# Patient Record
Sex: MALE | Race: WHITE | NOT HISPANIC OR LATINO | Employment: UNEMPLOYED | URBAN - METROPOLITAN AREA
[De-identification: names, ages, dates, MRNs, and addresses within clinical notes are randomized per-mention and may not be internally consistent; named-entity substitution may affect disease eponyms.]

---

## 2019-03-18 ENCOUNTER — HOSPITAL ENCOUNTER (EMERGENCY)
Facility: HOSPITAL | Age: 59
End: 2019-03-18
Attending: EMERGENCY MEDICINE | Admitting: EMERGENCY MEDICINE
Payer: COMMERCIAL

## 2019-03-18 ENCOUNTER — APPOINTMENT (EMERGENCY)
Dept: RADIOLOGY | Facility: HOSPITAL | Age: 59
End: 2019-03-18
Payer: COMMERCIAL

## 2019-03-18 ENCOUNTER — APPOINTMENT (EMERGENCY)
Dept: CT IMAGING | Facility: HOSPITAL | Age: 59
End: 2019-03-18
Payer: COMMERCIAL

## 2019-03-18 ENCOUNTER — OFFICE VISIT (OUTPATIENT)
Dept: URGENT CARE | Facility: CLINIC | Age: 59
End: 2019-03-18
Payer: COMMERCIAL

## 2019-03-18 ENCOUNTER — APPOINTMENT (EMERGENCY)
Dept: RADIOLOGY | Facility: HOSPITAL | Age: 59
DRG: 024 | End: 2019-03-18
Payer: COMMERCIAL

## 2019-03-18 VITALS
SYSTOLIC BLOOD PRESSURE: 177 MMHG | HEIGHT: 66 IN | BODY MASS INDEX: 35.2 KG/M2 | DIASTOLIC BLOOD PRESSURE: 88 MMHG | HEART RATE: 74 BPM | TEMPERATURE: 98.2 F | WEIGHT: 219 LBS | OXYGEN SATURATION: 99 % | RESPIRATION RATE: 20 BRPM

## 2019-03-18 VITALS
TEMPERATURE: 97.6 F | HEIGHT: 66 IN | OXYGEN SATURATION: 97 % | SYSTOLIC BLOOD PRESSURE: 173 MMHG | WEIGHT: 209 LBS | BODY MASS INDEX: 33.59 KG/M2 | RESPIRATION RATE: 20 BRPM | DIASTOLIC BLOOD PRESSURE: 107 MMHG | HEART RATE: 75 BPM

## 2019-03-18 DIAGNOSIS — R51.9 HEADACHE: ICD-10-CM
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
## 2019-03-18 DIAGNOSIS — H53.8 BLURRY VISION, BILATERAL: ICD-10-CM

## 2019-03-18 DIAGNOSIS — I63.9 STROKE (HCC): Primary | ICD-10-CM

## 2019-03-18 DIAGNOSIS — R41.82 ALTERED MENTAL STATUS, UNSPECIFIED ALTERED MENTAL STATUS TYPE: Primary | ICD-10-CM

## 2019-03-18 LAB
ABO GROUP BLD: NORMAL
ALBUMIN SERPL BCP-MCNC: 4.2 G/DL (ref 3.5–5)
ALP SERPL-CCNC: 110 U/L (ref 46–116)
ALT SERPL W P-5'-P-CCNC: 40 U/L (ref 12–78)
ANION GAP SERPL CALCULATED.3IONS-SCNC: 11 MMOL/L (ref 4–13)
APTT PPP: 30 SECONDS (ref 26–38)
AST SERPL W P-5'-P-CCNC: 29 U/L (ref 5–45)
ATRIAL RATE: 68 BPM
BASOPHILS # BLD AUTO: 0.1 THOUSANDS/ΜL (ref 0–0.1)
BASOPHILS NFR BLD AUTO: 1 % (ref 0–1)
BILIRUB SERPL-MCNC: 0.3 MG/DL (ref 0.2–1)
BLD GP AB SCN SERPL QL: NEGATIVE
BUN SERPL-MCNC: 19 MG/DL (ref 5–25)
CALCIUM SERPL-MCNC: 9.4 MG/DL (ref 8.3–10.1)
CHLORIDE SERPL-SCNC: 101 MMOL/L (ref 100–108)
CO2 SERPL-SCNC: 28 MMOL/L (ref 21–32)
CREAT SERPL-MCNC: 0.98 MG/DL (ref 0.6–1.3)
EOSINOPHIL # BLD AUTO: 0.57 THOUSAND/ΜL (ref 0–0.61)
EOSINOPHIL NFR BLD AUTO: 4 % (ref 0–6)
ERYTHROCYTE [DISTWIDTH] IN BLOOD BY AUTOMATED COUNT: 14 % (ref 11.6–15.1)
GFR SERPL CREATININE-BSD FRML MDRD: 83 ML/MIN/1.73SQ M
GLUCOSE SERPL-MCNC: 98 MG/DL (ref 65–140)
HCT VFR BLD AUTO: 50.9 % (ref 36.5–49.3)
HGB BLD-MCNC: 17.3 G/DL (ref 12–17)
IMM GRANULOCYTES # BLD AUTO: 0.06 THOUSAND/UL (ref 0–0.2)
IMM GRANULOCYTES NFR BLD AUTO: 1 % (ref 0–2)
INR PPP: 0.97 (ref 0.86–1.17)
LYMPHOCYTES # BLD AUTO: 4.78 THOUSANDS/ΜL (ref 0.6–4.47)
LYMPHOCYTES NFR BLD AUTO: 37 % (ref 14–44)
MCH RBC QN AUTO: 32.6 PG (ref 26.8–34.3)
MCHC RBC AUTO-ENTMCNC: 34 G/DL (ref 31.4–37.4)
MCV RBC AUTO: 96 FL (ref 82–98)
MONOCYTES # BLD AUTO: 1.05 THOUSAND/ΜL (ref 0.17–1.22)
MONOCYTES NFR BLD AUTO: 8 % (ref 4–12)
NEUTROPHILS # BLD AUTO: 6.44 THOUSANDS/ΜL (ref 1.85–7.62)
NEUTS SEG NFR BLD AUTO: 49 % (ref 43–75)
NRBC BLD AUTO-RTO: 0 /100 WBCS
P AXIS: 37 DEGREES
PLATELET # BLD AUTO: 299 THOUSANDS/UL (ref 149–390)
PMV BLD AUTO: 10 FL (ref 8.9–12.7)
POTASSIUM SERPL-SCNC: 4.5 MMOL/L (ref 3.5–5.3)
PR INTERVAL: 142 MS
PROT SERPL-MCNC: 7.9 G/DL (ref 6.4–8.2)
PROTHROMBIN TIME: 12.3 SECONDS (ref 11.8–14.2)
QRS AXIS: 42 DEGREES
QRSD INTERVAL: 88 MS
QT INTERVAL: 396 MS
QTC INTERVAL: 421 MS
RBC # BLD AUTO: 5.3 MILLION/UL (ref 3.88–5.62)
RH BLD: POSITIVE
SL AMB POCT GLUCOSE BLD: 115
SODIUM SERPL-SCNC: 140 MMOL/L (ref 136–145)
SPECIMEN EXPIRATION DATE: NORMAL
T WAVE AXIS: 21 DEGREES
TROPONIN I SERPL-MCNC: 0.02 NG/ML
VENTRICULAR RATE: 68 BPM
WBC # BLD AUTO: 13 THOUSAND/UL (ref 4.31–10.16)

## 2019-03-18 PROCEDURE — 96361 HYDRATE IV INFUSION ADD-ON: CPT

## 2019-03-18 PROCEDURE — 80053 COMPREHEN METABOLIC PANEL: CPT | Performed by: EMERGENCY MEDICINE

## 2019-03-18 PROCEDURE — 0042T HB CT PERFUSION W/CONTRAST CBF: CPT

## 2019-03-18 PROCEDURE — 86901 BLOOD TYPING SEROLOGIC RH(D): CPT | Performed by: EMERGENCY MEDICINE

## 2019-03-18 PROCEDURE — 70498 CT ANGIOGRAPHY NECK: CPT

## 2019-03-18 PROCEDURE — 85730 THROMBOPLASTIN TIME PARTIAL: CPT | Performed by: EMERGENCY MEDICINE

## 2019-03-18 PROCEDURE — 93010 ELECTROCARDIOGRAM REPORT: CPT | Performed by: INTERNAL MEDICINE

## 2019-03-18 PROCEDURE — 36415 COLL VENOUS BLD VENIPUNCTURE: CPT | Performed by: EMERGENCY MEDICINE

## 2019-03-18 PROCEDURE — 99285 EMERGENCY DEPT VISIT HI MDM: CPT

## 2019-03-18 PROCEDURE — 84484 ASSAY OF TROPONIN QUANT: CPT | Performed by: EMERGENCY MEDICINE

## 2019-03-18 PROCEDURE — 99213 OFFICE O/P EST LOW 20 MIN: CPT | Performed by: NURSE PRACTITIONER

## 2019-03-18 PROCEDURE — 93005 ELECTROCARDIOGRAM TRACING: CPT

## 2019-03-18 PROCEDURE — 70496 CT ANGIOGRAPHY HEAD: CPT

## 2019-03-18 PROCEDURE — 85025 COMPLETE CBC W/AUTO DIFF WBC: CPT | Performed by: EMERGENCY MEDICINE

## 2019-03-18 PROCEDURE — 96360 HYDRATION IV INFUSION INIT: CPT

## 2019-03-18 PROCEDURE — 71045 X-RAY EXAM CHEST 1 VIEW: CPT

## 2019-03-18 PROCEDURE — 86850 RBC ANTIBODY SCREEN: CPT | Performed by: EMERGENCY MEDICINE

## 2019-03-18 PROCEDURE — 85610 PROTHROMBIN TIME: CPT | Performed by: EMERGENCY MEDICINE

## 2019-03-18 PROCEDURE — 86900 BLOOD TYPING SEROLOGIC ABO: CPT | Performed by: EMERGENCY MEDICINE

## 2019-03-18 RX ORDER — METOPROLOL SUCCINATE 100 MG/1
100 TABLET, EXTENDED RELEASE ORAL DAILY
COMMUNITY
End: 2019-04-06 | Stop reason: HOSPADM

## 2019-03-18 RX ORDER — SODIUM CHLORIDE 9 MG/ML
125 INJECTION, SOLUTION INTRAVENOUS CONTINUOUS
Status: DISCONTINUED | OUTPATIENT
Start: 2019-03-18 | End: 2019-03-18 | Stop reason: HOSPADM

## 2019-03-18 RX ORDER — SIMVASTATIN 40 MG
40 TABLET ORAL
COMMUNITY
End: 2019-03-18

## 2019-03-18 RX ORDER — OMEPRAZOLE 20 MG/1
20 CAPSULE, DELAYED RELEASE ORAL DAILY
COMMUNITY
End: 2019-03-18

## 2019-03-18 RX ORDER — METOPROLOL TARTRATE 100 MG/1
25 TABLET ORAL EVERY 12 HOURS SCHEDULED
COMMUNITY
End: 2019-03-18

## 2019-03-18 RX ADMIN — SODIUM CHLORIDE 125 ML/HR: 0.9 INJECTION, SOLUTION INTRAVENOUS at 19:18

## 2019-03-18 RX ADMIN — IOHEXOL 95 ML: 350 INJECTION, SOLUTION INTRAVENOUS at 19:59

## 2019-03-18 NOTE — PROGRESS NOTES
Employer told pt he was stumbling with works today  Pt fell at home this am, doesn't believe he hit his head  Has no strength in arms and hands  Pt states he is a "pre-diabetic"  Pt just home from vacation [facility]

## 2019-03-18 NOTE — ED PROVIDER NOTES
History  Chief Complaint   Patient presents with    Head Injury     Patient states he awoke last night with a leg cramp and fell, alberto is not sure if he hit his head but states he feels like his judgement is off and he has a headache, and blurred vision  Alberto states lat week while on vacation he had the same issues where he didn't feel like himself, and his judgement was off     This is a 26-year-old male who presents for evaluation of generalized weakness  Patient states that at 3 o'clock this morning while sleeping he felt a cramp in his legs and he got up out of bed to walk it off both of his legs felt weak and gave out he fell but did not injure anything was no loss of consciousness no neck or back pain no head injury  He went back to bed and then woke up at 4:30 a m  This morning and was neurologically normal   At 6:00 a m  At work he states that his coworkers told him that he had slurred speech and seemed to be acting confused and groggy  That episode lasted for approximately 30 minutes  Denies any numbness he is ambulatory into the emergency room without any problems he had a frontal headache for approximately 2 weeks  He is a  and 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  Taking?   metFORMIN (GLUCOPHAGE) 500 mg tablet  Self Yes Yes   Sig: Take 500 mg by mouth 2 (two) times a day with meals metoprolol succinate (TOPROL-XL) 100 mg 24 hr tablet  Self Yes Yes   Sig: Take 100 mg by mouth daily      Facility-Administered Medications: None       Past Medical History:   Diagnosis Date    Diabetes mellitus ([facility] [address] )     Hypertension        Past Surgical History:   Procedure Laterality Date    BACK SURGERY      SHOULDER SURGERY         History reviewed  No pertinent family history  I have reviewed and agree with the history as documented  Social History     Tobacco Use    Smoking status: Current Every Day Smoker    Smokeless tobacco: Never Used   Substance Use Topics    Alcohol use: Yes    Drug use: Never        Review of Systems   Cardiovascular: Negative for chest pain  Gastrointestinal: Negative for abdominal pain  Neurological: Positive for speech difficulty, weakness and headaches  All other systems reviewed and are negative  Physical Exam  Physical Exam   Constitutional: He is oriented to person, place, and time  He appears well-developed and well-nourished  No distress  HENT:   Head: Normocephalic and atraumatic  Right Ear: External ear normal    Left Ear: External ear normal    Nose: Nose normal    Mouth/Throat: Oropharynx is clear and moist    Eyes: Pupils are equal, round, and reactive to light  EOM are normal  Right eye exhibits no discharge  Left eye exhibits no discharge  No scleral icterus  Neck: Neck supple  No JVD present  No tracheal deviation present  Cardiovascular: Normal rate, regular rhythm and intact distal pulses  Exam reveals no gallop and no friction rub  No murmur heard  Pulmonary/Chest: Effort normal and breath sounds normal  No stridor  No respiratory distress  He has no wheezes  He has no rales  Abdominal: Soft  Bowel sounds are normal  He exhibits no distension  There is no tenderness  There is no guarding  Musculoskeletal: Normal range of motion  He exhibits no edema, tenderness or deformity     Neurological: He is alert and oriented to person, place, and time  He displays normal reflexes  No cranial nerve deficit or sensory deficit  He exhibits normal muscle tone  Coordination normal    Skin: Skin is warm and dry  No rash noted  He is not diaphoretic  Psychiatric: He has a normal mood and affect  His behavior is normal  Judgment and thought content normal    Nursing note and vitals reviewed        Vital Signs  ED Triage Vitals   Temperature Pulse Respirations Blood Pressure SpO2   03/18/19 1818 03/18/19 1818 03/18/19 1818 03/18/19 1818 03/18/19 1818   97 6 °F (36 4 °C) 76 20 134/63 98 %      Temp src Heart Rate Source Patient Position - Orthostatic VS BP Location FiO2 (%)   -- 03/18/19 1905 03/18/19 1905 -- --    Monitor Lying        Pain Score       03/18/19 1818       No Pain           Vitals:    03/18/19 2222 03/18/19 2227 03/18/19 2232 03/18/19 2237   BP: (!) 193/99 (!) 179/101 (!) 178/105 (!) 173/107   Pulse: 75 76 76 75   Patient Position - Orthostatic VS: Lying Lying Lying Lying       qSOFA     Row Name 03/20/19 0645 03/20/19 0630 03/20/19 0615 03/20/19 0600 03/20/19 0545    Altered mental status GCS < 15              Respiratory Rate > / =22  0  0  0  0  0    Systolic BP < / =624              Q Sofa Score    Tom Arenas      Row Name 03/20/19 0515 03/20/19 0500 03/20/19 0445 03/20/19 0430 03/20/19 0400    Altered mental status GCS < 15              Respiratory Rate > / =22  0  0  0  0  0    Systolic BP < / =766      [facility] Sofa Score    Tom Arenas      Row Name 03/20/19 0345 03/20/19 0330 03/20/19 0315 03/20/19 0300 03/20/19 0245    Altered mental status GCS < 15              Respiratory Rate > / =22  0  0  0  0  0    Systolic BP < / =992              Q Sofa Score      0  0  0    Row Name 03/20/19 0230 03/20/19 0215 03/20/19 0200 03/20/19 0153 03/20/19 0140    Altered mental status GCS < 15              Respiratory Rate > / =22  0  0  0  0  1    Systolic BP < / =395              Q Sofa Score  0  0  0  0 1    Row Name 03/20/19 0100 03/20/19 0052 03/20/19 0045 03/20/19 0030 03/20/19 0020    Altered mental status GCS < 15              Respiratory Rate > / =22  0  0  0  0  0    Systolic BP < / =203              Q Sofa Score  0  0  0  1  1    Row Name 03/20/19 0000 03/19/19 2315 03/19/19 2310 03/19/19 2300 03/19/19 2245    Altered mental status GCS < 15  0      1      Respiratory Rate > / =22    0  0  0  0    Systolic BP < / =801    0          Q Sofa Score  0  1          Row Name 03/19/19 2230 03/19/19 2215 03/19/19 2206 03/19/19 2200 03/19/19 2145    Altered mental status GCS < 15              Respiratory Rate > / =22  0  0  0  0  0    Systolic BP < / =545              Q Sofa Score              Row Name 03/19/19 2130 03/19/19 2100 03/19/19 2030 03/19/19 2014 03/19/19 2000    Altered mental status GCS < 15          0    Respiratory Rate > / =22  0  0  0  0  0    Systolic BP < / =653              Q Sofa Score    Mary Mccabe      Row Name 03/19/19 1930 03/19/19 1900 03/19/19 1805 03/19/19 1800 03/19/19 1700    Altered mental status GCS < 15    0  0    0    Respiratory Rate > / =22  0  0    0  0    Systolic BP < / =592              Q Sofa Score    0  0  0  0    Row Name 03/19/19 1645 03/19/19 1630 03/19/19 1600 03/19/19 1542 03/19/19 1530    Altered mental status GCS < 15        0  0    Respiratory Rate > / =22  0  0  0    0    Systolic BP < / =511              Q Sofa Score  0  0  0  0  0    Row Name 03/19/19 1515 03/19/19 1500 03/19/19 1445 03/19/19 1045 03/19/19 1000    Altered mental status GCS < 15    0  0    0    Respiratory Rate > / =22  0  0  0  1  1    Systolic BP < / =934    0    0  0    Q Sofa Score  0  0  0  1  1    Row Name 03/19/19 0900 03/19/19 0802 03/19/19 0800 03/19/19 0730 03/19/19 0715    Altered mental status GCS < 15  0  0          Respiratory Rate > / =22  0    1  1  0    Systolic BP < / =940  0    0  0  0    Q Sofa Score  0  1  2 2  0    Row Name 03/19/19 0700 03/19/19 0645 03/19/19 0630 03/19/19 0600 03/19/19 0530    Altered mental status GCS < 15  1      1      Respiratory Rate > / =22  1  0  0    0    Systolic BP < / =801  0  0  0    0    Q Sofa Score  2  0  0  1  0    Row Name 03/19/19 0500 03/19/19 0415 03/19/19 0400 03/19/19 0345 03/19/19 0315    Altered mental status GCS < 15  0    0        Respiratory Rate > / =22    0    0  0    Systolic BP < / =149    0    0  0    Q Sofa Score  0  0  0  0  0    Row Name 03/19/19 0300 03/19/19 0230 03/19/19 0200 03/19/19 0130 03/19/19 0100    Altered mental status GCS < 15  0    1    0    Respiratory Rate > / =22    0  0  0      Systolic BP < / =958    0  0  0      Q Sofa Score  0  0  1  0  0    Row Name 03/19/19 0030 03/19/19 0015 03/18/19 2343 03/18/19 2237 03/18/19 2232    Altered mental status GCS < 15      0  0  0    Respiratory Rate > / =22  0  0  0  0  0    Systolic BP < / =158  0  0  0  0  0    Q Sofa Score  0  0  0  0  0    Row Name 03/18/19 2227 03/18/19 2222 03/18/19 22:17:58 03/18/19 22:02:54 03/18/19 22:00:18    Altered mental status GCS < 15  0  0  0  0      Respiratory Rate > / =22  0  0  0  0  1    Systolic BP < / =552  0  0  0  0      Q Sofa Score  0  0  0  0  1    Row Name 03/18/19 21:54:18 03/18/19 2153 03/18/19 21:50:18 03/18/19 21:44:58 03/18/19 21:42:24    Altered mental status GCS < 15          0    Respiratory Rate > / =22  0  0  1  1  0    Systolic BP < / =449    0      0    Q Sofa Score  0  0  1  1  0    Row Name 03/18/19 2142 03/18/19 21:40:18 03/18/19 21:34:38 03/18/19 21:30:18 03/18/19 2130    Altered mental status GCS < 15              Respiratory Rate > / =22    1  1  1      Systolic BP < / =799  0        0    Q Sofa Score  1  1  1  1  1    Row Name 03/18/19 21:25:18 03/18/19 21:19:38 03/18/19 21:15:18 03/18/19 2115 03/18/19 21:11:59    Altered mental status GCS < 15          0    Respiratory Rate > / =22  1  1  1    0 Systolic BP < / =534        0  0    Q Sofa Score  1  1  1  0  0    Row Name 03/18/19 21:10:17 03/18/19 21:04:38 03/18/19 21:00:18 03/18/19 2100 03/18/19 20:55:18    Altered mental status GCS < 15              Respiratory Rate > / =22  1  0  0    0    Systolic BP < / =642        0      Q Sofa Score  1  0  0  0  0    Row Name 03/18/19 20:49:47 03/18/19 2046 03/18/19 20:45:17 03/18/19 20:40:17 03/18/19 2040    Altered mental status GCS < 15          0    Respiratory Rate > / =22  1    0  0  0    Systolic BP < / =360    0      0    Q Sofa Score  1  0  0  0  0    Row Name 03/18/19 20:34:37 03/18/19 20:30:17 03/18/19 2030 03/18/19 20:25:17 03/18/19 20:19:38    Altered mental status GCS < 15              Respiratory Rate > / =22  0  1    0  0    Systolic BP < / =932      0        Q Sofa Score  0  1  0  0  0    Row Name 03/18/19 20:15:18 03/18/19 2015 03/18/19 20:10:17 03/18/19 20:10:03 03/18/19 20:04:47    Altered mental status GCS < 15        0      Respiratory Rate > / =22  1    0  0  0    Systolic BP < / =850    0    0      Q Sofa Score  1  0  0  0  0    Row Name 03/18/19 2001 03/18/19 19:59:37 03/18/19 19:56:17 03/18/19 1955 03/18/19 1935    Altered mental status GCS < 15        0  0    Respiratory Rate > / =22    0  0  0  0    Systolic BP < / =446  0      0  0    Q Sofa Score  0  0  0  0  0    Row Name 03/18/19 19:32:57 03/18/19 19:30:17 03/18/19 1930 03/18/19 19:25:17 03/18/19 19:19:37    Altered mental status GCS < 15              Respiratory Rate > / =22  1  0    1  1    Systolic BP < / =818      0        Q Sofa Score  1  0  1  1  1    Row Name 03/18/19 19:19:22 03/18/19 19:15:17 03/18/19 1915 03/18/19 19:10:17 03/18/19 1905    Altered mental status GCS < 15  0        0    Respiratory Rate > / =22  0  1    1  0    Systolic BP < / =631  0    0    0    Q Sofa Score  0  1  1  1  0    Row Name 03/18/19 19:04:47 03/18/19 1901 03/18/19 19:00:17 03/18/19 18:55:17 03/18/19 18:49:37    Altered mental status GCS < 15              Respiratory Rate > / =22  1    1  1  1    Systolic BP < / =464    0    Deja Burows Sofa Score  1  1  1  1  1    Row Name 03/18/19 18:45:17 03/18/19 1845 03/18/19 18:40:17 03/18/19 18:34:37 03/18/19 18:30:17    Altered mental status GCS < 15              Respiratory Rate > / =22  1    1  1  1    Systolic BP < / =166    0    Deja Burows Sofa Score  1  1  1  1  1    Row Name 03/18/19 1830 03/18/19 18:25:17 03/18/19 1818 03/18/19 1714       Altered mental status GCS < 15             Respiratory Rate > / =22    1  0  0     Systolic BP < / =887  0    0  0     Q Sofa Score  0  1  0  0           Visual Acuity  Visual Acuity      Most Recent Value   L Pupil Size (mm)  3   R Pupil Size (mm)  3          ED Medications  Medications   iohexol (OMNIPAQUE) 350 MG/ML injection (SINGLE-DOSE) 95 mL (95 mL Intravenous Given 3/1959)       Diagnostic Studies  Results Reviewed     Procedure Component Value Units Date/Time    Troponin I [[id number]]  (Normal) Collected:  03/18/19 1913    Lab Status:  Final result Specimen:  Blood from Arm, Left Updated:  03/18/19 1949     Troponin I 0 02 ng/mL     Comprehensive metabolic panel [[id number]] Collected:  03/18/19 1913    Lab Status:  Final result Specimen:  Blood from Arm, Left Updated:  03/18/19 1946     Sodium 140 mmol/L      Potassium 4 5 mmol/L      Chloride 101 mmol/L      CO2 28 mmol/L      ANION GAP 11 mmol/L      BUN 19 mg/dL      Creatinine 0 98 mg/dL      Glucose 98 mg/dL      Calcium 9 4 mg/dL      AST 29 U/L      ALT 40 U/L      Alkaline Phosphatase 110 U/L      Total Protein 7 9 g/dL      Albumin 4 2 g/dL      Total Bilirubin 0 30 mg/dL      eGFR 83 ml/min/1 73sq m     Narrative:       National Kidney Disease Education Program recommendations are as follows:  GFR calculation is accurate only with a steady state creatinine  Chronic Kidney disease less than 60 ml/min/1 73 sq  meters  Kidney failure less than 15 ml/min/1 73 sq  meters  Protime-INR [[id number]]  (Normal) Collected:  03/18/19 1913    Lab Status:  Final result Specimen:  Blood from Arm, Left Updated:  03/18/19 1942     Protime 12 3 seconds      INR 0 97    APTT [[id number]]  (Normal) Collected:  03/18/19 1913    Lab Status:  Final result Specimen:  Blood from Arm, Left Updated:  03/18/19 1942     PTT 30 seconds     CBC and differential [[id number]]  (Abnormal) Collected:  03/18/19 1913    Lab Status:  Final result Specimen:  Blood from Arm, Left Updated:  03/18/19 1929     WBC 13 00 Thousand/uL      RBC 5 30 Million/uL      Hemoglobin 17 3 g/dL      Hematocrit 50 9 %      MCV 96 fL      MCH 32 6 pg      MCHC 34 0 g/dL      RDW 14 0 %      MPV 10 0 fL      Platelets 141 Thousands/uL      nRBC 0 /100 WBCs      Neutrophils Relative 49 %      Immat GRANS % 1 %      Lymphocytes Relative 37 %      Monocytes Relative 8 %      Eosinophils Relative 4 %      Basophils Relative 1 %      Neutrophils Absolute 6 44 Thousands/µL      Immature Grans Absolute 0 06 Thousand/uL      Lymphocytes Absolute 4 78 Thousands/µL      Monocytes Absolute 1 05 Thousand/µL      Eosinophils Absolute 0 57 Thousand/µL      Basophils Absolute 0 10 Thousands/µL                  CTA head and neck with and without contrast   Final Result by Ty Romano MD (03/18 2127)      No evidence of acute intracranial hemorrhage  Right M1 segment occlusion  Endovascular consult recommended  Approximately 73% narrowing of the proximal right ICA just beyond the bifurcation  I personally discussed this study with Dr Carlos Hoffman clinically on 3/18/2019 at 9:22 PM                      Workstation performed: [id number]         CT recon only cervical spine   Final Result by Ty Romano MD (03/18 2136)      No acute fracture or dislocation                 Workstation performed: [id number]         XR chest 1 view portable   Final Result by Nubia Chapa MD (03/19 9605)   1  Clear lungs  2   Mild cardiomegaly  Workstation performed: [id number]                    Procedures  Procedures       Phone Contacts  ED Phone Contact    ED Course             Stroke Assessment     Row Name 03/18/19 1850 03/18/19 2201          NIH Stroke Scale    Interval  Baseline       Level of Consciousness (1a )  0  0     LOC Questions (1b )  0  0     LOC Commands (1c )  0  0     Best Gaze (2 )  0  0     Visual (3 )  0  0     Facial Palsy (4 )  0  0     Motor Arm, Left (5a )  0  0     Motor Arm, Right (5b )  0  0     Motor Leg, Left (6a )  0  0     Motor Leg, Right (6b )  0  0     Limb Ataxia (7 )  0  0     Sensory (8 )  0  0     Best Language (9 )  0  0     Dysarthria (10 )  0  0     Extinction and Inattention (11 ) (Formerly Neglect)  0  0     Total  0  0         First Filed Value   TPA Decision  Patient not a TPA candidate  Patient is not a candidate options  Unclear time of onset outside appropriate time window                          MDM  Number of Diagnoses or Management Options  Diagnosis management comments: Confusion and slurred speech earlier this morning patient neurologically normal at this time he is certainly not a tPA candidate will check CT scan and labs may have had a TIA versus hypoglycemia or cardiac rhythm disturbance most likely will require observation bed for further evaluation and Neurology consultation       Amount and/or Complexity of Data Reviewed  Clinical lab tests: ordered  Tests in the radiology section of CPT®: ordered        Disposition  Final diagnoses:   Fall, initial encounter   Stroke [facility])   Headache     Time reflects when diagnosis was documented in both MDM as applicable and the Disposition within this note     Time User Action Codes Description Comment    3/18/2019 10:05 PM Randa Burton Add [S09 90XA] Traumatic injury of head, initial encounter     3/18/2019 10:05 PM Randa Burton Remove [S09 90XA] Traumatic injury of head, initial encounter     3/18/2019 10:05 PM Mirella Pickett Add [S55  XGFX] Fall, initial encounter     3/18/2019 10:06 PM Mirella Pickett Add [I63 9] Stroke ([address] )     3/18/2019 10:06 PM Ann CLARK Add [R51] Headache     3/18/2019 10:06 PM Mirella Pickett Modify [D78  AFWQ] Fall, initial encounter     3/18/2019 10:06 PM Mirella Pickett Modify [I63 9] Stroke [facility])       ED Disposition     ED Disposition Condition Date/Time Comment    Transfer to Another [facility] Mar 18, 2019 10:01 PM Maria Baeza should be transferred out to [facility]          MD Documentation      Most Recent Value   Patient Condition  The patient has been stabilized such that within reasonable medical probability, no material deterioration of the patient condition or the condition of the unborn child(zeny) is likely to result from the transfer   Reason for Transfer  Level of Care needed not available at this facility   Benefits of Transfer  Specialized equipment and/or services available at the receiving facility (Include comment)________________________   Risks of Transfer  Potential for delay in receiving treatment, Potential deterioration of medical condition, Increased discomfort during transfer, Loss of IV, Possible worsening of condition or death during transfer   Accepting Physician  Adam Name, [address] [place] [address]   Sending MD  [facility]   Provider Certification  General risk, such as traffic hazards, adverse weather conditions, rough terrain or turbulence, possible failure of equipment (including vehicle or aircraft), or consequences of actions of persons outside the control of the transport personnel, Unanticipated needs of medical equipment and personnel during transport, Risk of worsening condition      RN Documentation      Most [address] [facility] Name, [address] [facility]      Follow-up Information    None         Discharge Medication List as of 3/18/2019 10:29 PM      CONTINUE these medications which have NOT CHANGED    Details   metFORMIN (GLUCOPHAGE) 500 mg tablet Take 500 mg by mouth 2 (two) times a day with meals, Historical Med      metoprolol succinate (TOPROL-XL) 100 mg 24 hr tablet Take 100 mg by mouth daily, Historical Med           No discharge procedures on file      ED Provider  Electronically Signed by           Yaneth Lara DO  03/20/19 7966

## 2019-03-18 NOTE — PROGRESS NOTES
Assessment/Plan    Altered mental status, unspecified altered mental status type [R41 82]  1  Altered mental status, unspecified altered mental status type  Transfer to other facility   2  Blurry vision, bilateral           Subjective:     Patient ID: Carrie Fernandez is a 60 y o  male  Reason For Visit / Chief Complaint  Chief Complaint   Patient presents with    Extremity Weakness     had an episode of weakness a couple weeks ago/today felt extremity weakness, leg cramps and fell     Blurred Vision     today         This is a 26-year-old male patient who presents to the urgent care today  He is accompanied by his father  Patient is complaining of blurry vision, stumbling when walking, unsteady gait and falling this morning  Patient states he had a similar episode approximately 2 weeks ago  Patient's blood sugar here was 115  Patient's employer reported that he was not acting like himself today  Patient denies chest pain, shortness of breath or back pain  Patient has no known allergies  Past Medical History:   Diagnosis Date    Diabetes mellitus ([facility] [address] )        History reviewed  No pertinent surgical history  History reviewed  No pertinent family history  Review of Systems   Constitutional: Negative for chills and fever  Respiratory: Negative for shortness of breath, wheezing and stridor  Cardiovascular: Negative for chest pain and palpitations  Gastrointestinal: Negative for abdominal pain, diarrhea, nausea and vomiting  Musculoskeletal: Negative for back pain and neck pain  Neurological: Positive for dizziness, weakness, light-headedness and numbness  Negative for tremors, seizures, syncope, facial asymmetry, speech difficulty and headaches  Objective:    BP (!) 177/88   Pulse 74   Temp 98 2 °F (36 8 °C)   Resp 20   Ht 5' 6" (1 676 m)   Wt 99 3 kg (219 lb)   SpO2 99%   BMI 35 35 kg/m²     Physical Exam   Constitutional: He is oriented to person, place, and time  He appears well-developed and well-nourished  Blood pressure abnormal   HENT:   Head: Normocephalic and atraumatic  Cardiovascular: Normal rate, regular rhythm and normal heart sounds  Exam reveals no gallop and no friction rub  No murmur heard  Pulmonary/Chest: Effort normal and breath sounds normal  No stridor  No respiratory distress  He has no wheezes  He has no rales  He exhibits no tenderness  Neurological: He is alert and oriented to person, place, and time  He has normal strength  He displays no tremor  He exhibits normal muscle tone  He displays no seizure activity  Coordination and gait abnormal    Skin: Skin is warm and dry  Capillary refill takes less than 2 seconds  Psychiatric: He has a normal mood and affect  Nursing note and vitals reviewed  Patient to be evaluated by the emergency department  Patient's wife is here  Patient declines ambulance transport    Wife is driving him directly to hospital

## 2019-03-18 NOTE — ED CARE HANDOFF
Emergency Department Sign Out Note        Sign out and transfer of care from Dr Mark Waters  See Separate Emergency Department note  The patient, Viet Barth, was evaluated by the previous provider for fall, headache, neuro sx      Workup Completed:  Labs Reviewed   CBC AND DIFFERENTIAL - Abnormal       Result Value Ref Range Status    WBC 13 00 (*) 4 31 - 10 16 Thousand/uL Final    RBC 5 30  3 88 - 5 62 Million/uL Final    Hemoglobin 17 3 (*) 12 0 - 17 0 g/dL Final    Hematocrit 50 9 (*) 36 5 - 49 3 % Final    MCV 96  82 - 98 fL Final    MCH 32 6  26 8 - 34 3 pg Final    MCHC 34 0  31 4 - 37 4 g/dL Final    RDW 14 0  11 6 - 15 1 % Final    MPV 10 0  8 9 - 12 7 fL Final    Platelets 048  366 - 390 Thousands/uL Final    nRBC 0  /100 WBCs Final    Neutrophils Relative 49  43 - 75 % Final    Immat GRANS % 1  0 - 2 % Final    Lymphocytes Relative 37  14 - 44 % Final    Monocytes Relative 8  4 - 12 % Final    Eosinophils Relative 4  0 - 6 % Final    Basophils Relative 1  0 - 1 % Final    Neutrophils Absolute 6 44  1 85 - 7 62 Thousands/µL Final    Immature Grans Absolute 0 06  0 00 - 0 20 Thousand/uL Final    Lymphocytes Absolute 4 78 (*) 0 60 - 4 47 Thousands/µL Final    Monocytes Absolute 1 05  0 17 - 1 22 Thousand/µL Final    Eosinophils Absolute 0 57  0 00 - 0 61 Thousand/µL Final    Basophils Absolute 0 10  0 00 - 0 10 Thousands/µL Final   PROTIME-INR - Normal    Protime 12 3  11 8 - 14 2 seconds Final    INR 0 97  0 86 - 1 17 Final   APTT - Normal    PTT 30  26 - 38 seconds Final    Comment: Therapeutic Heparin Range =  60-90 seconds   TROPONIN I - Normal    Troponin I 0 02  <=0 04 ng/mL Final    Comment:   [employer] Chemistry analyzer 99% cutoff is > 0 04 ng/mL in network labs     o cTnI 99% cutoff is useful only when applied to patients in the clinical setting of myocardial ischemia   o cTnI 99% cutoff should be interpreted in the context of clinical history, ECG findings and possibly cardiac imaging to establish correct diagnosis  o cTnI 99% cutoff may be suggestive but clearly not indicative of a coronary event without the clinical setting of myocardial ischemia  COMPREHENSIVE METABOLIC PANEL    Sodium 748  136 - 145 mmol/L Final    Potassium 4 5  3 5 - 5 3 mmol/L Final    Chloride 101  100 - 108 mmol/L Final    CO2 28  21 - 32 mmol/L Final    ANION GAP 11  4 - 13 mmol/L Final    BUN 19  5 - 25 mg/dL Final    Creatinine 0 98  0 60 - 1 30 mg/dL Final    Comment: Standardized to IDMS reference method    Glucose 98  65 - 140 mg/dL Final    Comment:   If the patient is fasting, the ADA then defines impaired fasting glucose as > 100 mg/dL and diabetes as > or equal to 123 mg/dL  Specimen collection should occur prior to Sulfasalazine administration due to the potential for falsely depressed results  Specimen collection should occur prior to Sulfapyridine administration due to the potential for falsely elevated results  Calcium 9 4  8 3 - 10 1 mg/dL Final    AST 29  5 - 45 U/L Final    Comment:   Specimen collection should occur prior to Sulfasalazine administration due to the potential for falsely depressed results  ALT 40  12 - 78 U/L Final    Comment:   Specimen collection should occur prior to Sulfasalazine administration due to the potential for falsely depressed results  Alkaline Phosphatase 110  46 - 116 U/L Final    Total Protein 7 9  6 4 - 8 2 g/dL Final    Albumin 4 2  3 5 - 5 0 g/dL Final    Total Bilirubin 0 30  0 20 - 1 00 mg/dL Final    eGFR 83  ml/min/1 73sq m Final    Narrative:     National Kidney Disease Education Program recommendations are as follows:  GFR calculation is accurate only with a steady state creatinine  Chronic Kidney disease less than 60 ml/min/1 73 sq  meters  Kidney failure less than 15 ml/min/1 73 sq  meters     TYPE AND SCREEN    ABO Grouping O   Final    Rh Factor Positive   Final    Antibody Screen Negative   Final    Specimen Expiration Date [id number]   Final        ED Course / Workup Pending (followup):  CTA head and neck with and without contrast   Final Result      No evidence of acute intracranial hemorrhage  Right M1 segment occlusion  Endovascular consult recommended  Approximately 73% narrowing of the proximal right ICA just beyond the bifurcation  I personally discussed this study with Dr Gael Jin clinically on 3/18/2019 at 9:22 PM                      Workstation performed: [id number]         CT recon only cervical spine   Final Result      No acute fracture or dislocation  Workstation performed: [id number]         XR chest 1 view portable    (Results Pending)              Stroke Assessment     Row Name 03/18/19 1850 03/18/19 2201          NIH Stroke Scale    Interval  Baseline       Level of Consciousness (1a )  0  0     LOC Questions (1b )  0  0     LOC Commands (1c )  0  0     Best Gaze (2 )  0  0     Visual (3 )  0  0     Facial Palsy (4 )  0  0     Motor Arm, Left (5a )  0  0     Motor Arm, Right (5b )  0  0     Motor Leg, Left (6a )  0  0     Motor Leg, Right (6b )  0  0     Limb Ataxia (7 )  0  0     Sensory (8 )  0  0     Best Language (9 )  0  0     Dysarthria (10 )  0  0     Extinction and Inattention (11 ) (Formerly Neglect)  0  0     Total  0  0         First Filed Value   TPA Decision  Patient not a TPA candidate  Patient is not a candidate options  Unclear time of onset outside appropriate time window  ED Course as of Mar 18 2329   Mon Mar 18, 2019   1924 Pt signed out by Dr Griselda Rodriguez  Pls see his note for full details  In short, pt with fall last night and intermittent neuro sx  NIHSS 0 during Dr Hiram lowery  Not worst HA of life - Dr Griselda Rodriguez felt LP for [facility] not necessary  Waiting for CTA head/neck at time of signout, then likely TIA admit if workup neg        2121 Discussed w/ rads - pt with what appears to be acute R MCA thrombus occlusion  2126 Repeat neuro exam no focal findings  2127 Call to neuro  2139 Discussed with Dr Shyam Cevallos, recommends emergent tx to [place], /80, needs intervention  Pressors as needed to keep BP up       2145 PACS notified  2202 Late entry 2/2 critical care  Discussed again with Dr Shyam Cevallos, who discussed with Dr Clare Figueroa  Pt is going to be a stroke alert, transfer  Accepting Dr Tabatha Sánchez  Family updated  2203 Collar cleared          ECG 12 Lead Documentation  Date/Time: 3/18/2019 7:42 PM  Performed by: Ghanshyam Parisi MD  Authorized by: Ghanshyam Parisi MD     Indications / Diagnosis:  Fall  ECG reviewed by me, the ED Provider: yes    Patient location:  ED  Previous ECG:     Previous ECG:  Unavailable    Comparison to cardiac monitor: Yes    Interpretation:     Interpretation: normal    Rate:     ECG rate:  68    ECG rate assessment: normal    Rhythm:     Rhythm: sinus rhythm    Ectopy:     Ectopy: none    QRS:     QRS axis:  Normal  Conduction:     Conduction: normal    ST segments:     ST segments:  Normal  T waves:     T waves: non-specific      CriticalCare Time  Performed by: Ghanshyam Parisi MD  Authorized by: Ghanshyam Parisi MD     Critical care provider statement:     Critical care time (minutes):  45    Critical care time was exclusive of:  Separately billable procedures and treating other patients and teaching time    Critical care was necessary to treat or prevent imminent or life-threatening deterioration of the following conditions:  CNS failure or compromise    Critical care was time spent personally by me on the following activities:  Obtaining history from patient or surrogate, development of treatment plan with patient or surrogate, discussions with consultants, examination of patient and re-evaluation of patient's condition    I assumed direction of critical care for this patient from another provider in my specialty: yes        MDM  Number of Diagnoses or Management Options  Fall, initial encounter: new and requires workup  Headache: new and requires workup  Stroke [facility]): new and requires workup     Amount and/or Complexity of Data Reviewed  Clinical lab tests: reviewed  Tests in the radiology section of CPT®: reviewed  Tests in the medicine section of CPT®: reviewed  Discussion of test results with the performing providers: yes  Obtain history from someone other than the patient: yes  Review and summarize past medical records: yes  Discuss the patient with other providers: yes  Independent visualization of images, tracings, or specimens: yes    Risk of Complications, Morbidity, and/or Mortality  Presenting problems: high  Diagnostic procedures: high  Management options: high    Patient Progress  Patient progress: stable      Disposition  Final diagnoses:   Fall, initial encounter   [facility])   Headache     Time reflects when diagnosis was documented in both MDM as applicable and the Disposition within this note     Time User Action Codes Description Comment    3/18/2019 10:05 PM Gisel Feldman Add [S09 90XA] Traumatic injury of head, initial encounter     3/18/2019 10:05 PM Gisel Feldman Remove [S09 90XA] Traumatic injury of head, initial encounter     3/18/2019 10:05 PM Gisel Feldman Add [G39  VDNT] Fall, initial encounter     3/18/2019 10:06 PM Gisel Feldman Add [I63 9] Stroke ([address] )     3/18/2019 10:06 PM Alves Poor S Add [R51] Headache     3/18/2019 10:06 PM Gisel Feldman Modify [G61  FQTY] Fall, initial encounter     3/18/2019 10:06 PM Gisel Feldman Modify [I63 9] Stroke [facility])       ED Disposition     ED Disposition Condition Date/Time Comment    Transfer to Another [facility] Mar 18, 2019 10:01 PM Mahsa Jose should be transferred out to [facility]          MD Documentation      Most Recent Value   Patient Condition  The patient has been stabilized such that within reasonable medical probability, no material deterioration of the patient condition or the condition of the unborn child(zeny) is likely to result from the transfer   Reason for Transfer  Level of Care needed not available at this facility   Benefits of Transfer  Specialized equipment and/or services available at the receiving facility (Include comment)________________________   Risks of Transfer  Potential for delay in receiving treatment, Potential deterioration of medical condition, Increased discomfort during transfer, Loss of IV, Possible worsening of condition or death during transfer   Accepting Physician  Adam Name, [address] [place] [address]   Sending MD  [facility]   Provider Certification  General risk, such as traffic hazards, adverse weather conditions, rough terrain or turbulence, possible failure of equipment (including vehicle or aircraft), or consequences of actions of persons outside the control of the transport personnel, Unanticipated needs of medical equipment and personnel during transport, Risk of worsening condition      RN Documentation      Most [address] [facility] Name, [address] [place] [address]      Follow-up Information    None       Discharge Medication List as of 3/18/2019 10:29 PM      CONTINUE these medications which have NOT CHANGED    Details   metFORMIN (GLUCOPHAGE) 500 mg tablet Take 500 mg by mouth 2 (two) times a day with meals, Historical Med      metoprolol succinate (TOPROL-XL) 100 mg 24 hr tablet Take 100 mg by mouth daily, Historical Med           No discharge procedures on file         ED Provider  Electronically Signed by     Bry Schaefer MD  03/18/19 4057

## 2019-03-19 ENCOUNTER — APPOINTMENT (INPATIENT)
Dept: NON INVASIVE DIAGNOSTICS | Facility: HOSPITAL | Age: 59
DRG: 024 | End: 2019-03-19
Payer: COMMERCIAL

## 2019-03-19 ENCOUNTER — ANESTHESIA (INPATIENT)
Dept: RADIOLOGY | Facility: HOSPITAL | Age: 59
DRG: 024 | End: 2019-03-19
Payer: COMMERCIAL

## 2019-03-19 ENCOUNTER — APPOINTMENT (INPATIENT)
Dept: RADIOLOGY | Facility: HOSPITAL | Age: 59
DRG: 024 | End: 2019-03-19
Payer: COMMERCIAL

## 2019-03-19 ENCOUNTER — APPOINTMENT (INPATIENT)
Dept: RADIOLOGY | Facility: HOSPITAL | Age: 59
DRG: 024 | End: 2019-03-19
Attending: RADIOLOGY
Payer: COMMERCIAL

## 2019-03-19 ENCOUNTER — ANESTHESIA EVENT (INPATIENT)
Dept: RADIOLOGY | Facility: HOSPITAL | Age: 59
DRG: 024 | End: 2019-03-19
Payer: COMMERCIAL

## 2019-03-19 PROBLEM — I10 HYPERTENSION: Status: ACTIVE | Noted: 2019-03-19

## 2019-03-19 PROBLEM — R73.9 HYPERGLYCEMIA: Status: ACTIVE | Noted: 2019-03-19

## 2019-03-19 PROBLEM — K21.9 GERD (GASTROESOPHAGEAL REFLUX DISEASE): Status: ACTIVE | Noted: 2019-03-19

## 2019-03-19 PROBLEM — I63.511 RIGHT MIDDLE CEREBRAL ARTERY STROKE (HCC): Status: ACTIVE | Noted: 2019-03-19

## 2019-03-19 PROBLEM — R51.9 HEADACHE: Status: ACTIVE | Noted: 2019-03-19

## 2019-03-19 PROCEDURE — C1894 INTRO/SHEATH, NON-LASER: HCPCS

## 2019-03-19 PROCEDURE — C1757 CATH, THROMBECTOMY/EMBOLECT: HCPCS

## 2019-03-19 PROCEDURE — 36224 PLACE CATH CAROTD ART: CPT

## 2019-03-19 PROCEDURE — 36223 PLACE CATH CAROTID/INOM ART: CPT

## 2019-03-19 PROCEDURE — 70551 MRI BRAIN STEM W/O DYE: CPT

## 2019-03-19 PROCEDURE — 61645 PERQ ART M-THROMBECT &/NFS: CPT | Performed by: RADIOLOGY

## 2019-03-19 PROCEDURE — C1769 GUIDE WIRE: HCPCS

## 2019-03-19 PROCEDURE — C1884 EMBOLIZATION PROTECT SYST: HCPCS

## 2019-03-19 PROCEDURE — 70450 CT HEAD/BRAIN W/O DYE: CPT

## 2019-03-19 PROCEDURE — C1725 CATH, TRANSLUMIN NON-LASER: HCPCS

## 2019-03-19 PROCEDURE — C1760 CLOSURE DEV, VASC: HCPCS

## 2019-03-19 PROCEDURE — 61645 PERQ ART M-THROMBECT &/NFS: CPT

## 2019-03-19 PROCEDURE — C1887 CATHETER, GUIDING: HCPCS

## 2019-03-19 PROCEDURE — 76937 US GUIDE VASCULAR ACCESS: CPT

## 2019-03-19 PROCEDURE — 0042T HB CT PERFUSION W/CONTRAST CBF: CPT

## 2019-03-19 PROCEDURE — 93306 TTE W/DOPPLER COMPLETE: CPT

## 2019-03-19 RX ORDER — GLYCOPYRROLATE 0.2 MG/ML
INJECTION INTRAMUSCULAR; INTRAVENOUS AS NEEDED
Status: DISCONTINUED | OUTPATIENT
Start: 2019-03-19 | End: 2019-03-19 | Stop reason: SURG

## 2019-03-19 RX ORDER — LIDOCAINE HYDROCHLORIDE 10 MG/ML
INJECTION, SOLUTION INFILTRATION; PERINEURAL AS NEEDED
Status: DISCONTINUED | OUTPATIENT
Start: 2019-03-19 | End: 2019-03-19 | Stop reason: SURG

## 2019-03-19 RX ORDER — SODIUM CHLORIDE 9 MG/ML
INJECTION, SOLUTION INTRAVENOUS CONTINUOUS PRN
Status: DISCONTINUED | OUTPATIENT
Start: 2019-03-19 | End: 2019-03-19 | Stop reason: SURG

## 2019-03-19 RX ORDER — PROPOFOL 10 MG/ML
INJECTION, EMULSION INTRAVENOUS AS NEEDED
Status: DISCONTINUED | OUTPATIENT
Start: 2019-03-19 | End: 2019-03-19 | Stop reason: SURG

## 2019-03-19 RX ORDER — ROCURONIUM BROMIDE 10 MG/ML
INJECTION, SOLUTION INTRAVENOUS AS NEEDED
Status: DISCONTINUED | OUTPATIENT
Start: 2019-03-19 | End: 2019-03-19 | Stop reason: SURG

## 2019-03-19 RX ORDER — NEOSTIGMINE METHYLSULFATE 1 MG/ML
INJECTION INTRAVENOUS AS NEEDED
Status: DISCONTINUED | OUTPATIENT
Start: 2019-03-19 | End: 2019-03-19 | Stop reason: SURG

## 2019-03-19 RX ORDER — LABETALOL 20 MG/4 ML (5 MG/ML) INTRAVENOUS SYRINGE
AS NEEDED
Status: DISCONTINUED | OUTPATIENT
Start: 2019-03-19 | End: 2019-03-19 | Stop reason: SURG

## 2019-03-19 RX ORDER — SUCCINYLCHOLINE/SOD CL,ISO/PF 100 MG/5ML
SYRINGE (ML) INTRAVENOUS AS NEEDED
Status: DISCONTINUED | OUTPATIENT
Start: 2019-03-19 | End: 2019-03-19 | Stop reason: SURG

## 2019-03-19 RX ADMIN — LABETALOL HYDROCHLORIDE 5 MG: 5 INJECTION, SOLUTION INTRAVENOUS at 14:07

## 2019-03-19 RX ADMIN — ROCURONIUM BROMIDE 20 MG: 10 INJECTION INTRAVENOUS at 13:00

## 2019-03-19 RX ADMIN — ROCURONIUM BROMIDE 50 MG: 10 INJECTION INTRAVENOUS at 12:00

## 2019-03-19 RX ADMIN — PHENYLEPHRINE HYDROCHLORIDE 100 MCG/MIN: 10 INJECTION INTRAVENOUS at 12:04

## 2019-03-19 RX ADMIN — NEOSTIGMINE METHYLSULFATE 5 MG: 1 INJECTION INTRAVENOUS at 14:03

## 2019-03-19 RX ADMIN — GLYCOPYRROLATE 0.8 MG: 0.2 INJECTION, SOLUTION INTRAMUSCULAR; INTRAVENOUS at 14:03

## 2019-03-19 RX ADMIN — PROPOFOL 200 MG: 10 INJECTION, EMULSION INTRAVENOUS at 11:48

## 2019-03-19 RX ADMIN — PROPOFOL 200 MG: 10 INJECTION, EMULSION INTRAVENOUS at 13:00

## 2019-03-19 RX ADMIN — Medication 100 MG: at 11:48

## 2019-03-19 RX ADMIN — PHENYLEPHRINE HYDROCHLORIDE 100 MCG: 10 INJECTION INTRAVENOUS at 12:04

## 2019-03-19 RX ADMIN — SODIUM CHLORIDE: 0.9 INJECTION, SOLUTION INTRAVENOUS at 11:40

## 2019-03-19 RX ADMIN — LIDOCAINE HYDROCHLORIDE 50 MG: 10 INJECTION, SOLUTION INFILTRATION; PERINEURAL at 11:48

## 2019-03-19 NOTE — EMTALA/ACUTE CARE TRANSFER
[facility] [id number]  Dept: [address] [place] [address] CONSENT    NAME Tamar Hammonds                                         1960                              MRN [id number]    I have been informed of my rights regarding examination, treatment, and transfer   by Dr Meier att  providers found    Benefits:      Risks:        Transfer Request   I acknowledge that my medical condition has been evaluated and explained to me by the emergency department physician or other qualified medical person and/or my attending physician who has recommended and offered to me further medical examination and treatment  I understand the Hospital's obligation with respect to the treatment and stabilization of my emergency medical condition  I nevertheless request to be transferred  I release the Hospital, the doctor, and any other persons caring for me from all responsibility or liability for any injury or ill effects that may result from my transfer and agree to accept all responsibility for the consequences of my choice to transfer, rather than receive stabilizing treatment at the Hospital  I understand that because the transfer is my request, my insurance may not provide reimbursement for the services  The Hospital will assist and direct me and my family in how to make arrangements for transfer, but the hospital is not liable for any fees charged by the transport service  In spite of this understanding, I refuse to consent to further medical examination and treatment which has been offered to me, and request transfer to    I authorize the performance of emergency medical procedures and treatments upon me in both transit and upon arrival at the receiving facility  Additionally, I authorize the release of any and all medical records to the receiving facility and request they be transported with me, if possible      I authorize the performance of emergency medical procedures and treatments upon me in both transit and upon arrival at the receiving facility  Additionally, I authorize the release of any and all medical records to the receiving facility and request they be transported with me, if possible  I understand that the safest mode of transportation during a medical emergency is an ambulance and that the Hospital advocates the use of this mode of transport  Risks of traveling to the receiving facility by car, including absence of medical control, life sustaining equipment, such as oxygen, and medical personnel has been explained to me and I fully understand them  ([place]HA[place] CORRECT BOX BELOW)  [  ]  I consent to the stated transfer and to be transported by ambulance/helicopter  [  ]  I consent to the stated transfer, but refuse transportation by ambulance and accept full responsibility for my transportation by car  I understand the risks of non-ambulance transfers and I exonerate the Hospital and its staff from any deterioration in my condition that results from this refusal     X___________________________________________    DATE  19  TIME________  Signature of patient or legally responsible individual signing on patient behalf           RELATIONSHIP TO PATIENT_________________________          Provider Certification    NAME Jn Soto                                         1960                              MRN [id number]    A medical screening exam was performed on the above named patient  Based on the examination:    Condition Necessitating Transfer The primary encounter diagnosis was Stroke [facility])  Diagnoses of Fall, initial encounter and Headache were also pertinent to this visit      Patient Condition:      Reason for Transfer:      Transfer Requirements: Facility     · Space available and qualified personnel available for treatment as acknowledged by    · Agreed to accept transfer and to provide appropriate medical treatment as acknowledged by          · Appropriate medical records of the examination and treatment of the patient are provided at the time of transfer   [facility], [address] _______  · Transfer will be performed by qualified personnel from    and appropriate transfer equipment as required, including the use of necessary and appropriate life support measures  Provider Certification: I have examined the patient and explained the following risks and benefits of being transferred/refusing transfer to the patient/family:         Based on these reasonable risks and benefits to the patient and/or the unborn child(zeny), and based upon the information available at the time of the patients examination, I certify that the medical benefits reasonably to be expected from the provision of appropriate medical treatments at another medical facility outweigh the increasing risks, if any, to the individuals medical condition, and in the case of labor to the unborn child, from effecting the transfer      X____________________________________________ DATE 03/18/19        TIME_______      ORIGINAL - SEND TO MEDICAL RECORDS   COPY - SEND WITH PATIENT DURING TRANSFER

## 2019-03-19 NOTE — QUICK NOTE
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

## 2019-03-19 NOTE — ANESTHESIA POSTPROCEDURE EVALUATION
Post-Op Assessment Note    CV Status:  Stable  Pain Score: 0    Pain management: adequate     Mental Status:  Alert and awake   Hydration Status:  Euvolemic   PONV Controlled:  Controlled   Airway Patency:  Patent   Post Op Vitals Reviewed: Yes      Staff: Anesthesiologist, CRNA           BP  146/56   Temp   98 9   Pulse  88   Resp   16   SpO2   100

## 2019-03-19 NOTE — ED NOTES
Pt awaiting MD for initial eval  Wife bedside  Pt is A+Ox4  resp unlabored  Pt c/o mild headache only  Pt on cardiac monitor   Call bell in reach     Caitlyn Menard RN  03/18/19 7324

## 2019-03-19 NOTE — ANESTHESIA PREPROCEDURE EVALUATION
Review of Systems/Medical History          Cardiovascular  Hypertension ,    Pulmonary       GI/Hepatic    GERD ,             Endo/Other  Diabetes ,      GYN       Hematology   Musculoskeletal       Neurology    CVA , Headaches,    Psychology                Anesthesia Plan  ASA Score- 4 Emergent    Anesthesia Type- general with ASA Monitors  Additional Monitors: arterial line  Airway Plan: ETT  Plan Factors-    Induction- intravenous and rapid sequence induction  Postoperative Plan-   Planned trial extubation    Informed Consent- Anesthetic plan and risks discussed with patient  I personally reviewed this patient with the CRNA  Discussed and agreed on the Anesthesia Plan with the CRNA  Alhaji Guajardo

## 2019-03-19 NOTE — TRAUMA DOCUMENTATION
Per Dr Sam Alford, patient SBP to be kept above 160  Levophed to be held until pressure drops before order parameters

## 2019-03-19 NOTE — EMTALA/ACUTE CARE TRANSFER
[address]  [facility] [id number]  Dept: [address] [place] [address] CONSENT    NAME Zuleyka Murry                                         1960                              MRN [id number]    I have been informed of my rights regarding examination, treatment, and transfer   by Dr Meier att  providers found    Benefits:      Risks:        Transfer Request   I acknowledge that my medical condition has been evaluated and explained to me by the emergency department physician or other qualified medical person and/or my attending physician who has recommended and offered to me further medical examination and treatment  I understand the Hospital's obligation with respect to the treatment and stabilization of my emergency medical condition  I nevertheless request to be transferred  I release the Hospital, the doctor, and any other persons caring for me from all responsibility or liability for any injury or ill effects that may result from my transfer and agree to accept all responsibility for the consequences of my choice to transfer, rather than receive stabilizing treatment at the Hospital  I understand that because the transfer is my request, my insurance may not provide reimbursement for the services  The Hospital will assist and direct me and my family in how to make arrangements for transfer, but the hospital is not liable for any fees charged by the transport service  In spite of this understanding, I refuse to consent to further medical examination and treatment which has been offered to me, and request transfer to  [address], [address] : [place]  I authorize the performance of emergency medical procedures and treatments upon me in both transit and upon arrival at the receiving facility    Additionally, I authorize the release of any and all medical records to the receiving facility and request they be transported with me, if possible  I authorize the performance of emergency medical procedures and treatments upon me in both transit and upon arrival at the receiving facility  Additionally, I authorize the release of any and all medical records to the receiving facility and request they be transported with me, if possible  I understand that the safest mode of transportation during a medical emergency is an ambulance and that the Hospital advocates the use of this mode of transport  Risks of traveling to the receiving facility by car, including absence of medical control, life sustaining equipment, such as oxygen, and medical personnel has been explained to me and I fully understand them  (LASHAWN CORRECT BOX BELOW)  [  ]  I consent to the stated transfer and to be transported by ambulance/helicopter  [  ]  I consent to the stated transfer, but refuse transportation by ambulance and accept full responsibility for my transportation by car  I understand the risks of non-ambulance transfers and I exonerate the Hospital and its staff from any deterioration in my condition that results from this refusal     X___________________________________________    DATE  19  TIME________  Signature of patient or legally responsible individual signing on patient behalf           RELATIONSHIP TO PATIENT_________________________          Provider Certification    NAME Magdy Light                                        [facility] 1960                              MRN [id number]    A medical screening exam was performed on the above named patient  Based on the examination:    Condition Necessitating Transfer The primary encounter diagnosis was Stroke [facility])  Diagnoses of Fall, initial encounter and Headache were also pertinent to this visit      Patient Condition:      Reason for Transfer:      Transfer Requirements: Facility     · Space available and qualified personnel available for treatment as acknowledged by    · Agreed to accept transfer and to provide appropriate medical treatment as acknowledged by          · Appropriate medical records of the examination and treatment of the patient are provided at the time of transfer   [address] [facility], [address] _______  · Transfer will be performed by qualified personnel from    and appropriate transfer equipment as required, including the use of necessary and appropriate life support measures  Provider Certification: I have examined the patient and explained the following risks and benefits of being transferred/refusing transfer to the patient/family:         Based on these reasonable risks and benefits to the patient and/or the unborn child(zeny), and based upon the information available at the time of the patients examination, I certify that the medical benefits reasonably to be expected from the provision of appropriate medical treatments at another medical facility outweigh the increasing risks, if any, to the individuals medical condition, and in the case of labor to the unborn child, from effecting the transfer      X____________________________________________ DATE 03/18/19        TIME_______      ORIGINAL - SEND TO MEDICAL RECORDS   COPY - SEND WITH PATIENT DURING TRANSFER

## 2019-03-20 ENCOUNTER — APPOINTMENT (INPATIENT)
Dept: RADIOLOGY | Facility: HOSPITAL | Age: 59
DRG: 024 | End: 2019-03-20
Payer: COMMERCIAL

## 2019-03-20 PROCEDURE — 70450 CT HEAD/BRAIN W/O DYE: CPT

## 2019-03-20 PROCEDURE — 93306 TTE W/DOPPLER COMPLETE: CPT | Performed by: INTERNAL MEDICINE

## 2019-03-21 ENCOUNTER — ANESTHESIA EVENT (INPATIENT)
Dept: NON INVASIVE DIAGNOSTICS | Facility: HOSPITAL | Age: 59
DRG: 024 | End: 2019-03-21
Payer: COMMERCIAL

## 2019-03-21 PROCEDURE — 93312 ECHO TRANSESOPHAGEAL: CPT

## 2019-03-21 PROCEDURE — 93325 DOPPLER ECHO COLOR FLOW MAPG: CPT | Performed by: INTERNAL MEDICINE

## 2019-03-21 PROCEDURE — 93320 DOPPLER ECHO COMPLETE: CPT | Performed by: INTERNAL MEDICINE

## 2019-03-21 PROCEDURE — 93312 ECHO TRANSESOPHAGEAL: CPT | Performed by: INTERNAL MEDICINE

## 2019-03-21 RX ORDER — PROPOFOL 10 MG/ML
INJECTION, EMULSION INTRAVENOUS AS NEEDED
Status: DISCONTINUED | OUTPATIENT
Start: 2019-03-21 | End: 2019-03-21 | Stop reason: SURG

## 2019-03-21 RX ORDER — SODIUM CHLORIDE 9 MG/ML
INJECTION, SOLUTION INTRAVENOUS CONTINUOUS PRN
Status: DISCONTINUED | OUTPATIENT
Start: 2019-03-21 | End: 2019-03-21 | Stop reason: SURG

## 2019-03-21 RX ADMIN — PROPOFOL 50 MG: 10 INJECTION, EMULSION INTRAVENOUS at 16:01

## 2019-03-21 RX ADMIN — PHENYLEPHRINE HYDROCHLORIDE 100 MCG: 10 INJECTION INTRAVENOUS at 15:57

## 2019-03-21 RX ADMIN — PHENYLEPHRINE HYDROCHLORIDE 100 MCG: 10 INJECTION INTRAVENOUS at 16:01

## 2019-03-21 RX ADMIN — PROPOFOL 50 MG: 10 INJECTION, EMULSION INTRAVENOUS at 15:54

## 2019-03-21 RX ADMIN — PROPOFOL 50 MG: 10 INJECTION, EMULSION INTRAVENOUS at 16:00

## 2019-03-21 RX ADMIN — PROPOFOL 50 MG: 10 INJECTION, EMULSION INTRAVENOUS at 15:55

## 2019-03-21 RX ADMIN — PROPOFOL 50 MG: 10 INJECTION, EMULSION INTRAVENOUS at 16:02

## 2019-03-21 RX ADMIN — SODIUM CHLORIDE: 0.9 INJECTION, SOLUTION INTRAVENOUS at 15:37

## 2019-03-21 RX ADMIN — PROPOFOL 30 MG: 10 INJECTION, EMULSION INTRAVENOUS at 15:58

## 2019-03-21 RX ADMIN — PROPOFOL 40 MG: 10 INJECTION, EMULSION INTRAVENOUS at 15:53

## 2019-03-21 RX ADMIN — PHENYLEPHRINE HYDROCHLORIDE 100 MCG: 10 INJECTION INTRAVENOUS at 16:03

## 2019-03-21 RX ADMIN — PROPOFOL 50 MG: 10 INJECTION, EMULSION INTRAVENOUS at 16:03

## 2019-03-21 RX ADMIN — PROPOFOL 40 MG: 10 INJECTION, EMULSION INTRAVENOUS at 15:56

## 2019-03-21 NOTE — ANESTHESIA PREPROCEDURE EVALUATION
Review of Systems/Medical History  Patient summary reviewed  Chart reviewed      Cardiovascular  Hypertension ,    Pulmonary  Sleep apnea ,        GI/Hepatic    GERD ,        Prostatic disorder,        Endo/Other  Diabetes well controlled ,   Obesity  morbid obesity   GYN       Hematology  Negative hematology ROS      Musculoskeletal  Negative musculoskeletal ROS        Neurology    CVA , acute, Headaches,   Comment: R MCA CVA S/P Endovascular Thrombectomy  R ICA Stenosis    Perfusion dependance Psychology   Negative psychology ROS              Physical Exam    Airway    Mallampati score: II  TM Distance: >3 FB  Neck ROM: full     Dental   No notable dental hx     Cardiovascular  Rhythm: regular, Rate: normal,     Pulmonary  Pulmonary exam normal     Other Findings      Lab Results   Component Value Date    WBC 12 58 (H) 03/21/2019    HGB 12 1 03/21/2019    HCT 35 7 (L) 03/21/2019    MCV 97 03/21/2019     03/21/2019     Lab Results   Component Value Date    CALCIUM 8 5 03/21/2019    K 3 8 03/21/2019    CO2 27 03/21/2019     (H) 03/21/2019    BUN 9 03/21/2019    CREATININE 0 67 03/21/2019     Lab Results   Component Value Date    INR 0 97 03/18/2019    PROTIME 12 3 03/18/2019     Lab Results   Component Value Date    PTT 30 03/18/2019           Anesthesia Plan  ASA Score- 4     Anesthesia Type- IV sedation with anesthesia with ASA Monitors  Additional Monitors:   Airway Plan:         Plan Factors-    Induction- intravenous  Postoperative Plan-     Informed Consent- Anesthetic plan and risks discussed with patient  I personally reviewed this patient with the CRNA  Discussed and agreed on the Anesthesia Plan with the CRNA  Kortney Tiwari

## 2019-03-21 NOTE — ANESTHESIA POSTPROCEDURE EVALUATION
Post-Op Assessment Note    CV Status:  Stable  Pain Score: 0    Pain management: adequate     Mental Status:  Alert and awake   Hydration Status:  Euvolemic   PONV Controlled:  Controlled   Airway Patency:  Patent   Post Op Vitals Reviewed: Yes      Staff: CRNA   Comments: patient on neosynephrine drip           BP   132/60   Temp      Pulse  83   Resp   16   SpO2   99

## 2019-03-22 ENCOUNTER — APPOINTMENT (INPATIENT)
Dept: RADIOLOGY | Facility: HOSPITAL | Age: 59
DRG: 024 | End: 2019-03-22
Payer: COMMERCIAL

## 2019-03-22 PROCEDURE — 70551 MRI BRAIN STEM W/O DYE: CPT

## 2019-03-22 PROCEDURE — 70450 CT HEAD/BRAIN W/O DYE: CPT

## 2019-03-24 ENCOUNTER — APPOINTMENT (INPATIENT)
Dept: RADIOLOGY | Facility: HOSPITAL | Age: 59
DRG: 024 | End: 2019-03-24
Payer: COMMERCIAL

## 2019-03-24 PROCEDURE — 76770 US EXAM ABDO BACK WALL COMP: CPT

## 2019-03-26 ENCOUNTER — HOSPITAL ENCOUNTER (INPATIENT)
Facility: HOSPITAL | Age: 59
LOS: 11 days | Discharge: HOME | DRG: 939 | End: 2019-04-06
Payer: COMMERCIAL

## 2019-03-26 DIAGNOSIS — I10 HYPERTENSION, UNSPECIFIED TYPE: Primary | ICD-10-CM

## 2019-03-26 DIAGNOSIS — Z86.39 HISTORY OF DIABETES MELLITUS, TYPE II: ICD-10-CM

## 2019-03-26 DIAGNOSIS — I63.511 RIGHT MIDDLE CEREBRAL ARTERY STROKE (HCC): ICD-10-CM

## 2019-03-26 DIAGNOSIS — G47.00 INSOMNIA: ICD-10-CM

## 2019-03-26 DIAGNOSIS — F17.200 NICOTINE DEPENDENCE: ICD-10-CM

## 2019-03-26 DIAGNOSIS — I65.21 STENOSIS OF RIGHT CAROTID ARTERY: ICD-10-CM

## 2019-03-26 DIAGNOSIS — J31.0 RHINITIS: ICD-10-CM

## 2019-03-26 DIAGNOSIS — E78.1 HYPERTRIGLYCERIDEMIA: ICD-10-CM

## 2019-03-26 DIAGNOSIS — R33.9 URINARY RETENTION: ICD-10-CM

## 2019-03-26 PROBLEM — H52.4 PRESBYOPIA: Status: ACTIVE | Noted: 2019-03-26

## 2019-03-26 PROBLEM — Z91.89 AT RISK FOR VENOUS THROMBOEMBOLISM (VTE): Status: ACTIVE | Noted: 2019-03-26

## 2019-03-26 PROBLEM — E78.5 DYSLIPIDEMIA: Status: ACTIVE | Noted: 2019-03-26

## 2019-03-26 PROBLEM — R51.9 HEADACHE: Status: RESOLVED | Noted: 2019-03-19 | Resolved: 2019-03-26

## 2019-03-26 PROBLEM — E78.5 DYSLIPIDEMIA: Status: RESOLVED | Noted: 2019-03-26 | Resolved: 2019-03-26

## 2019-03-26 PROBLEM — R73.9 HYPERGLYCEMIA: Status: RESOLVED | Noted: 2019-03-19 | Resolved: 2019-03-26

## 2019-03-26 LAB
GLUCOSE SERPL-MCNC: 111 MG/DL (ref 65–140)
GLUCOSE SERPL-MCNC: 136 MG/DL (ref 65–140)

## 2019-03-26 PROCEDURE — 97535 SELF CARE MNGMENT TRAINING: CPT

## 2019-03-26 PROCEDURE — 82948 REAGENT STRIP/BLOOD GLUCOSE: CPT

## 2019-03-26 PROCEDURE — 97166 OT EVAL MOD COMPLEX 45 MIN: CPT

## 2019-03-26 PROCEDURE — 99255 IP/OBS CONSLTJ NEW/EST HI 80: CPT

## 2019-03-26 RX ORDER — FLUTICASONE PROPIONATE 50 MCG
1 SPRAY, SUSPENSION (ML) NASAL DAILY
Status: DISCONTINUED | OUTPATIENT
Start: 2019-03-27 | End: 2019-04-06 | Stop reason: HOSPADM

## 2019-03-26 RX ORDER — TAMSULOSIN HYDROCHLORIDE 0.4 MG/1
0.4 CAPSULE ORAL
Status: DISCONTINUED | OUTPATIENT
Start: 2019-03-26 | End: 2019-04-06 | Stop reason: HOSPADM

## 2019-03-26 RX ORDER — SENNOSIDES 8.6 MG
1 TABLET ORAL DAILY
Status: DISCONTINUED | OUTPATIENT
Start: 2019-03-27 | End: 2019-04-04

## 2019-03-26 RX ORDER — BISACODYL 10 MG
10 SUPPOSITORY, RECTAL RECTAL DAILY PRN
Status: DISCONTINUED | OUTPATIENT
Start: 2019-03-26 | End: 2019-03-26

## 2019-03-26 RX ORDER — CLOPIDOGREL BISULFATE 75 MG/1
75 TABLET ORAL DAILY
Status: DISCONTINUED | OUTPATIENT
Start: 2019-03-27 | End: 2019-04-06 | Stop reason: HOSPADM

## 2019-03-26 RX ORDER — AMLODIPINE BESYLATE 2.5 MG/1
2.5 TABLET ORAL DAILY
Status: DISCONTINUED | OUTPATIENT
Start: 2019-03-27 | End: 2019-04-06 | Stop reason: HOSPADM

## 2019-03-26 RX ORDER — POLYETHYLENE GLYCOL 3350 17 G/17G
17 POWDER, FOR SOLUTION ORAL DAILY
Status: DISCONTINUED | OUTPATIENT
Start: 2019-03-27 | End: 2019-04-04

## 2019-03-26 RX ORDER — FLUTICASONE PROPIONATE 50 MCG
1 SPRAY, SUSPENSION (ML) NASAL DAILY
Status: DISCONTINUED | OUTPATIENT
Start: 2019-03-27 | End: 2019-03-26

## 2019-03-26 RX ORDER — NICOTINE 21 MG/24HR
21 PATCH, TRANSDERMAL 24 HOURS TRANSDERMAL DAILY
Status: DISCONTINUED | OUTPATIENT
Start: 2019-03-27 | End: 2019-04-06 | Stop reason: HOSPADM

## 2019-03-26 RX ORDER — POLYETHYLENE GLYCOL 3350 17 G/17G
17 POWDER, FOR SOLUTION ORAL DAILY PRN
Status: DISCONTINUED | OUTPATIENT
Start: 2019-03-26 | End: 2019-04-06 | Stop reason: HOSPADM

## 2019-03-26 RX ORDER — BISACODYL 10 MG
10 SUPPOSITORY, RECTAL RECTAL DAILY PRN
Status: DISCONTINUED | OUTPATIENT
Start: 2019-03-26 | End: 2019-04-06 | Stop reason: HOSPADM

## 2019-03-26 RX ORDER — ACETAMINOPHEN 325 MG/1
975 TABLET ORAL EVERY 6 HOURS PRN
Status: DISCONTINUED | OUTPATIENT
Start: 2019-03-26 | End: 2019-04-06 | Stop reason: HOSPADM

## 2019-03-26 RX ORDER — HEPARIN SODIUM 5000 [USP'U]/ML
5000 INJECTION, SOLUTION INTRAVENOUS; SUBCUTANEOUS EVERY 8 HOURS SCHEDULED
Status: DISCONTINUED | OUTPATIENT
Start: 2019-03-26 | End: 2019-04-05

## 2019-03-26 RX ORDER — ATORVASTATIN CALCIUM 80 MG/1
80 TABLET, FILM COATED ORAL
Status: DISCONTINUED | OUTPATIENT
Start: 2019-03-26 | End: 2019-04-06 | Stop reason: HOSPADM

## 2019-03-26 RX ORDER — BISACODYL 10 MG
10 SUPPOSITORY, RECTAL RECTAL ONCE
Status: DISCONTINUED | OUTPATIENT
Start: 2019-03-26 | End: 2019-04-04

## 2019-03-26 RX ORDER — DOCUSATE SODIUM 100 MG/1
100 CAPSULE, LIQUID FILLED ORAL 2 TIMES DAILY
Status: DISCONTINUED | OUTPATIENT
Start: 2019-03-26 | End: 2019-04-04

## 2019-03-26 RX ORDER — ONDANSETRON 4 MG/1
4 TABLET, ORALLY DISINTEGRATING ORAL EVERY 6 HOURS PRN
Status: DISCONTINUED | OUTPATIENT
Start: 2019-03-26 | End: 2019-04-06 | Stop reason: HOSPADM

## 2019-03-26 RX ORDER — PANTOPRAZOLE SODIUM 40 MG/1
40 TABLET, DELAYED RELEASE ORAL
Status: DISCONTINUED | OUTPATIENT
Start: 2019-03-27 | End: 2019-04-06 | Stop reason: HOSPADM

## 2019-03-26 RX ORDER — ASPIRIN 325 MG
325 TABLET ORAL DAILY
Status: DISCONTINUED | OUTPATIENT
Start: 2019-03-27 | End: 2019-04-06 | Stop reason: HOSPADM

## 2019-03-26 RX ORDER — ECHINACEA PURPUREA EXTRACT 125 MG
1 TABLET ORAL 4 TIMES DAILY
Status: DISCONTINUED | OUTPATIENT
Start: 2019-03-26 | End: 2019-04-06 | Stop reason: HOSPADM

## 2019-03-26 RX ADMIN — TAMSULOSIN HYDROCHLORIDE 0.4 MG: 0.4 CAPSULE ORAL at 17:31

## 2019-03-26 RX ADMIN — ATORVASTATIN CALCIUM 80 MG: 80 TABLET, FILM COATED ORAL at 17:31

## 2019-03-26 RX ADMIN — HEPARIN SODIUM 5000 UNITS: 5000 INJECTION INTRAVENOUS; SUBCUTANEOUS at 21:49

## 2019-03-26 RX ADMIN — Medication 1 SPRAY: at 21:49

## 2019-03-26 RX ADMIN — BISACODYL 10 MG: 5 TABLET, COATED ORAL at 19:45

## 2019-03-26 RX ADMIN — DOCUSATE SODIUM 100 MG: 100 CAPSULE, LIQUID FILLED ORAL at 17:31

## 2019-03-26 RX ADMIN — Medication 1 SPRAY: at 17:32

## 2019-03-26 RX ADMIN — HEPARIN SODIUM 5000 UNITS: 5000 INJECTION INTRAVENOUS; SUBCUTANEOUS at 16:36

## 2019-03-26 RX ADMIN — Medication 1 SPRAY: at 15:09

## 2019-03-27 PROBLEM — K59.00 CONSTIPATION: Status: ACTIVE | Noted: 2019-03-27

## 2019-03-27 PROBLEM — R33.9 URINARY RETENTION: Status: ACTIVE | Noted: 2019-03-27

## 2019-03-27 LAB
25(OH)D3 SERPL-MCNC: 32 NG/ML (ref 30–100)
ALBUMIN SERPL BCP-MCNC: 4 G/DL (ref 3.5–5)
ALP SERPL-CCNC: 83 U/L (ref 46–116)
ALT SERPL W P-5'-P-CCNC: 65 U/L (ref 12–78)
ANION GAP SERPL CALCULATED.3IONS-SCNC: 5 MMOL/L (ref 4–13)
AST SERPL W P-5'-P-CCNC: 40 U/L (ref 5–45)
BILIRUB SERPL-MCNC: 0.52 MG/DL (ref 0.2–1)
BUN SERPL-MCNC: 15 MG/DL (ref 5–25)
CALCIUM SERPL-MCNC: 9 MG/DL (ref 8.3–10.1)
CHLORIDE SERPL-SCNC: 108 MMOL/L (ref 100–108)
CO2 SERPL-SCNC: 26 MMOL/L (ref 21–32)
CREAT SERPL-MCNC: 0.75 MG/DL (ref 0.6–1.3)
ERYTHROCYTE [DISTWIDTH] IN BLOOD BY AUTOMATED COUNT: 14.1 % (ref 11.6–15.1)
GFR SERPL CREATININE-BSD FRML MDRD: 100 ML/MIN/1.73SQ M
GLUCOSE SERPL-MCNC: 133 MG/DL (ref 65–140)
GLUCOSE SERPL-MCNC: 134 MG/DL (ref 65–140)
GLUCOSE SERPL-MCNC: 95 MG/DL (ref 65–140)
GLUCOSE SERPL-MCNC: 96 MG/DL (ref 65–140)
GLUCOSE SERPL-MCNC: 97 MG/DL (ref 65–140)
HCT VFR BLD AUTO: 39.7 % (ref 36.5–49.3)
HGB BLD-MCNC: 13.5 G/DL (ref 12–17)
MCH RBC QN AUTO: 32.9 PG (ref 26.8–34.3)
MCHC RBC AUTO-ENTMCNC: 34 G/DL (ref 31.4–37.4)
MCV RBC AUTO: 97 FL (ref 82–98)
PLATELET # BLD AUTO: 294 THOUSANDS/UL (ref 149–390)
PMV BLD AUTO: 10.3 FL (ref 8.9–12.7)
POTASSIUM SERPL-SCNC: 3.8 MMOL/L (ref 3.5–5.3)
PROT SERPL-MCNC: 7.1 G/DL (ref 6.4–8.2)
RBC # BLD AUTO: 4.1 MILLION/UL (ref 3.88–5.62)
SODIUM SERPL-SCNC: 139 MMOL/L (ref 136–145)
WBC # BLD AUTO: 9.3 THOUSAND/UL (ref 4.31–10.16)

## 2019-03-27 PROCEDURE — 80053 COMPREHEN METABOLIC PANEL: CPT

## 2019-03-27 PROCEDURE — 92523 SPEECH SOUND LANG COMPREHEN: CPT

## 2019-03-27 PROCEDURE — 97112 NEUROMUSCULAR REEDUCATION: CPT

## 2019-03-27 PROCEDURE — 97127 HB COGNITIVE SKILLS DEVELOPMENT, EACH 15 MUNUTES: CPT

## 2019-03-27 PROCEDURE — G0515 COGNITIVE SKILLS DEVELOPMENT: HCPCS

## 2019-03-27 PROCEDURE — 97535 SELF CARE MNGMENT TRAINING: CPT

## 2019-03-27 PROCEDURE — 82306 VITAMIN D 25 HYDROXY: CPT

## 2019-03-27 PROCEDURE — 82948 REAGENT STRIP/BLOOD GLUCOSE: CPT

## 2019-03-27 PROCEDURE — 97530 THERAPEUTIC ACTIVITIES: CPT

## 2019-03-27 PROCEDURE — 99232 SBSQ HOSP IP/OBS MODERATE 35: CPT

## 2019-03-27 PROCEDURE — 85027 COMPLETE CBC AUTOMATED: CPT

## 2019-03-27 PROCEDURE — 97161 PT EVAL LOW COMPLEX 20 MIN: CPT

## 2019-03-27 RX ADMIN — POLYETHYLENE GLYCOL 3350 17 G: 17 POWDER, FOR SOLUTION ORAL at 09:12

## 2019-03-27 RX ADMIN — CLOPIDOGREL BISULFATE 75 MG: 75 TABLET ORAL at 09:12

## 2019-03-27 RX ADMIN — AMLODIPINE BESYLATE 2.5 MG: 2.5 TABLET ORAL at 09:12

## 2019-03-27 RX ADMIN — DOCUSATE SODIUM 100 MG: 100 CAPSULE, LIQUID FILLED ORAL at 09:12

## 2019-03-27 RX ADMIN — SENNOSIDES 8.6 MG: 8.6 TABLET, FILM COATED ORAL at 09:12

## 2019-03-27 RX ADMIN — HEPARIN SODIUM 5000 UNITS: 5000 INJECTION INTRAVENOUS; SUBCUTANEOUS at 21:31

## 2019-03-27 RX ADMIN — PANTOPRAZOLE SODIUM 40 MG: 40 TABLET, DELAYED RELEASE ORAL at 05:13

## 2019-03-27 RX ADMIN — ATORVASTATIN CALCIUM 80 MG: 80 TABLET, FILM COATED ORAL at 18:51

## 2019-03-27 RX ADMIN — DOCUSATE SODIUM 100 MG: 100 CAPSULE, LIQUID FILLED ORAL at 18:51

## 2019-03-27 RX ADMIN — HEPARIN SODIUM 5000 UNITS: 5000 INJECTION INTRAVENOUS; SUBCUTANEOUS at 05:13

## 2019-03-27 RX ADMIN — NICOTINE 21 MG: 21 PATCH, EXTENDED RELEASE TRANSDERMAL at 09:12

## 2019-03-27 RX ADMIN — ASPIRIN 325 MG ORAL TABLET 325 MG: 325 PILL ORAL at 09:12

## 2019-03-27 RX ADMIN — Medication 1 SPRAY: at 14:25

## 2019-03-27 RX ADMIN — Medication 1 SPRAY: at 21:32

## 2019-03-27 RX ADMIN — FLUTICASONE PROPIONATE 1 SPRAY: 50 SPRAY, METERED NASAL at 10:27

## 2019-03-27 RX ADMIN — TAMSULOSIN HYDROCHLORIDE 0.4 MG: 0.4 CAPSULE ORAL at 21:31

## 2019-03-27 RX ADMIN — HEPARIN SODIUM 5000 UNITS: 5000 INJECTION INTRAVENOUS; SUBCUTANEOUS at 14:25

## 2019-03-28 ENCOUNTER — APPOINTMENT (INPATIENT)
Dept: NON INVASIVE DIAGNOSTICS | Facility: HOSPITAL | Age: 59
DRG: 939 | End: 2019-03-28
Payer: COMMERCIAL

## 2019-03-28 PROBLEM — M19.012 ARTHROPATHY OF LEFT SHOULDER: Status: ACTIVE | Noted: 2019-03-28
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
## 2019-03-28 PROBLEM — I63.9: Status: ACTIVE | Noted: 2019-03-28

## 2019-03-28 PROBLEM — G81.90: Status: ACTIVE | Noted: 2019-03-28

## 2019-03-28 LAB
GLUCOSE SERPL-MCNC: 102 MG/DL (ref 65–140)
GLUCOSE SERPL-MCNC: 105 MG/DL (ref 65–140)
GLUCOSE SERPL-MCNC: 128 MG/DL (ref 65–140)
GLUCOSE SERPL-MCNC: 93 MG/DL (ref 65–140)

## 2019-03-28 PROCEDURE — 97127 HB COGNITIVE SKILLS DEVELOPMENT, EACH 15 MUNUTES: CPT

## 2019-03-28 PROCEDURE — 97110 THERAPEUTIC EXERCISES: CPT

## 2019-03-28 PROCEDURE — C1764 EVENT RECORDER, CARDIAC: HCPCS

## 2019-03-28 PROCEDURE — 97116 GAIT TRAINING THERAPY: CPT

## 2019-03-28 PROCEDURE — 33285 INSJ SUBQ CAR RHYTHM MNTR: CPT | Performed by: INTERNAL MEDICINE

## 2019-03-28 PROCEDURE — 33285 INSJ SUBQ CAR RHYTHM MNTR: CPT

## 2019-03-28 PROCEDURE — 97530 THERAPEUTIC ACTIVITIES: CPT

## 2019-03-28 PROCEDURE — 0JH632Z INSERTION OF MONITORING DEVICE INTO CHEST SUBCUTANEOUS TISSUE AND FASCIA, PERCUTANEOUS APPROACH: ICD-10-PCS | Performed by: INTERNAL MEDICINE

## 2019-03-28 PROCEDURE — 82948 REAGENT STRIP/BLOOD GLUCOSE: CPT

## 2019-03-28 PROCEDURE — G0515 COGNITIVE SKILLS DEVELOPMENT: HCPCS

## 2019-03-28 PROCEDURE — 99233 SBSQ HOSP IP/OBS HIGH 50: CPT

## 2019-03-28 PROCEDURE — 97112 NEUROMUSCULAR REEDUCATION: CPT

## 2019-03-28 RX ORDER — LANOLIN ALCOHOL/MO/W.PET/CERES
6 CREAM (GRAM) TOPICAL
Status: DISCONTINUED | OUTPATIENT
Start: 2019-03-28 | End: 2019-04-06 | Stop reason: HOSPADM

## 2019-03-28 RX ORDER — LORAZEPAM 0.5 MG/1
0.25 TABLET ORAL ONCE
Status: COMPLETED | OUTPATIENT
Start: 2019-03-28 | End: 2019-03-28

## 2019-03-28 RX ORDER — LORAZEPAM 0.5 MG/1
0.25 TABLET ORAL 2 TIMES DAILY PRN
Status: DISCONTINUED | OUTPATIENT
Start: 2019-03-28 | End: 2019-03-30

## 2019-03-28 RX ORDER — LIDOCAINE HYDROCHLORIDE AND EPINEPHRINE 10; 10 MG/ML; UG/ML
INJECTION, SOLUTION INFILTRATION; PERINEURAL CODE/TRAUMA/SEDATION MEDICATION
Status: COMPLETED | OUTPATIENT
Start: 2019-03-28 | End: 2019-03-28

## 2019-03-28 RX ADMIN — HEPARIN SODIUM 5000 UNITS: 5000 INJECTION INTRAVENOUS; SUBCUTANEOUS at 05:10

## 2019-03-28 RX ADMIN — Medication 1 SPRAY: at 12:27

## 2019-03-28 RX ADMIN — AMLODIPINE BESYLATE 2.5 MG: 2.5 TABLET ORAL at 08:11

## 2019-03-28 RX ADMIN — ASPIRIN 325 MG ORAL TABLET 325 MG: 325 PILL ORAL at 08:09

## 2019-03-28 RX ADMIN — MELATONIN 6 MG: at 21:58

## 2019-03-28 RX ADMIN — TAMSULOSIN HYDROCHLORIDE 0.4 MG: 0.4 CAPSULE ORAL at 18:57

## 2019-03-28 RX ADMIN — PANTOPRAZOLE SODIUM 40 MG: 40 TABLET, DELAYED RELEASE ORAL at 05:10

## 2019-03-28 RX ADMIN — CLOPIDOGREL BISULFATE 75 MG: 75 TABLET ORAL at 08:09

## 2019-03-28 RX ADMIN — HEPARIN SODIUM 5000 UNITS: 5000 INJECTION INTRAVENOUS; SUBCUTANEOUS at 13:51

## 2019-03-28 RX ADMIN — LORAZEPAM 0.25 MG: 0.5 TABLET ORAL at 18:57

## 2019-03-28 RX ADMIN — NICOTINE 21 MG: 21 PATCH, EXTENDED RELEASE TRANSDERMAL at 08:13

## 2019-03-28 RX ADMIN — LIDOCAINE HYDROCHLORIDE,EPINEPHRINE BITARTRATE 15 ML: 10; .01 INJECTION, SOLUTION INFILTRATION; PERINEURAL at 11:21

## 2019-03-28 RX ADMIN — ATORVASTATIN CALCIUM 80 MG: 80 TABLET, FILM COATED ORAL at 18:57

## 2019-03-28 RX ADMIN — DOCUSATE SODIUM 100 MG: 100 CAPSULE, LIQUID FILLED ORAL at 18:57

## 2019-03-28 RX ADMIN — Medication 1 SPRAY: at 21:58

## 2019-03-28 RX ADMIN — Medication 1 SPRAY: at 19:00

## 2019-03-28 RX ADMIN — FLUTICASONE PROPIONATE 1 SPRAY: 50 SPRAY, METERED NASAL at 07:10

## 2019-03-28 RX ADMIN — HEPARIN SODIUM 5000 UNITS: 5000 INJECTION INTRAVENOUS; SUBCUTANEOUS at 21:58

## 2019-03-28 RX ADMIN — Medication 1 SPRAY: at 08:11

## 2019-03-29 PROBLEM — F43.22 ADJUSTMENT REACTION WITH ANXIETY: Status: ACTIVE | Noted: 2019-03-29

## 2019-03-29 PROBLEM — G47.00 INSOMNIA: Status: ACTIVE | Noted: 2019-03-29

## 2019-03-29 LAB
GLUCOSE SERPL-MCNC: 103 MG/DL (ref 65–140)
GLUCOSE SERPL-MCNC: 109 MG/DL (ref 65–140)
GLUCOSE SERPL-MCNC: 118 MG/DL (ref 65–140)
GLUCOSE SERPL-MCNC: 145 MG/DL (ref 65–140)

## 2019-03-29 PROCEDURE — 99232 SBSQ HOSP IP/OBS MODERATE 35: CPT

## 2019-03-29 PROCEDURE — 97112 NEUROMUSCULAR REEDUCATION: CPT

## 2019-03-29 PROCEDURE — G0515 COGNITIVE SKILLS DEVELOPMENT: HCPCS

## 2019-03-29 PROCEDURE — 97530 THERAPEUTIC ACTIVITIES: CPT

## 2019-03-29 PROCEDURE — 97127 HB COGNITIVE SKILLS DEVELOPMENT, EACH 15 MUNUTES: CPT

## 2019-03-29 PROCEDURE — 82948 REAGENT STRIP/BLOOD GLUCOSE: CPT

## 2019-03-29 RX ADMIN — ATORVASTATIN CALCIUM 80 MG: 80 TABLET, FILM COATED ORAL at 17:02

## 2019-03-29 RX ADMIN — PANTOPRAZOLE SODIUM 40 MG: 40 TABLET, DELAYED RELEASE ORAL at 05:44

## 2019-03-29 RX ADMIN — Medication 1 SPRAY: at 08:30

## 2019-03-29 RX ADMIN — Medication 1 SPRAY: at 17:02

## 2019-03-29 RX ADMIN — Medication 1 SPRAY: at 11:26

## 2019-03-29 RX ADMIN — DOCUSATE SODIUM 100 MG: 100 CAPSULE, LIQUID FILLED ORAL at 08:25

## 2019-03-29 RX ADMIN — HEPARIN SODIUM 5000 UNITS: 5000 INJECTION INTRAVENOUS; SUBCUTANEOUS at 13:59

## 2019-03-29 RX ADMIN — HEPARIN SODIUM 5000 UNITS: 5000 INJECTION INTRAVENOUS; SUBCUTANEOUS at 05:43

## 2019-03-29 RX ADMIN — CLOPIDOGREL BISULFATE 75 MG: 75 TABLET ORAL at 08:25

## 2019-03-29 RX ADMIN — ASPIRIN 325 MG ORAL TABLET 325 MG: 325 PILL ORAL at 08:25

## 2019-03-29 RX ADMIN — FLUTICASONE PROPIONATE 1 SPRAY: 50 SPRAY, METERED NASAL at 05:44

## 2019-03-29 RX ADMIN — MELATONIN 6 MG: at 21:16

## 2019-03-29 RX ADMIN — POLYETHYLENE GLYCOL 3350 17 G: 17 POWDER, FOR SOLUTION ORAL at 08:26

## 2019-03-29 RX ADMIN — NICOTINE 21 MG: 21 PATCH, EXTENDED RELEASE TRANSDERMAL at 08:26

## 2019-03-29 RX ADMIN — TAMSULOSIN HYDROCHLORIDE 0.4 MG: 0.4 CAPSULE ORAL at 17:02

## 2019-03-29 RX ADMIN — HEPARIN SODIUM 5000 UNITS: 5000 INJECTION INTRAVENOUS; SUBCUTANEOUS at 21:15

## 2019-03-29 RX ADMIN — AMLODIPINE BESYLATE 2.5 MG: 2.5 TABLET ORAL at 08:25

## 2019-03-29 RX ADMIN — DOCUSATE SODIUM 100 MG: 100 CAPSULE, LIQUID FILLED ORAL at 17:02

## 2019-03-29 RX ADMIN — SENNOSIDES 8.6 MG: 8.6 TABLET, FILM COATED ORAL at 08:25

## 2019-03-29 RX ADMIN — Medication 1 SPRAY: at 21:16

## 2019-03-30 LAB
GLUCOSE SERPL-MCNC: 100 MG/DL (ref 65–140)
GLUCOSE SERPL-MCNC: 103 MG/DL (ref 65–140)
GLUCOSE SERPL-MCNC: 146 MG/DL (ref 65–140)
GLUCOSE SERPL-MCNC: 91 MG/DL (ref 65–140)

## 2019-03-30 PROCEDURE — 97116 GAIT TRAINING THERAPY: CPT

## 2019-03-30 PROCEDURE — G0515 COGNITIVE SKILLS DEVELOPMENT: HCPCS

## 2019-03-30 PROCEDURE — 99232 SBSQ HOSP IP/OBS MODERATE 35: CPT | Performed by: PHYSICAL MEDICINE & REHABILITATION

## 2019-03-30 PROCEDURE — 82948 REAGENT STRIP/BLOOD GLUCOSE: CPT

## 2019-03-30 PROCEDURE — 97112 NEUROMUSCULAR REEDUCATION: CPT

## 2019-03-30 PROCEDURE — 97535 SELF CARE MNGMENT TRAINING: CPT

## 2019-03-30 PROCEDURE — 97530 THERAPEUTIC ACTIVITIES: CPT

## 2019-03-30 PROCEDURE — 97127 HB COGNITIVE SKILLS DEVELOPMENT, EACH 15 MUNUTES: CPT

## 2019-03-30 RX ORDER — LORAZEPAM 0.5 MG/1
0.5 TABLET ORAL 2 TIMES DAILY PRN
Status: DISCONTINUED | OUTPATIENT
Start: 2019-03-30 | End: 2019-04-06 | Stop reason: HOSPADM

## 2019-03-30 RX ADMIN — AMLODIPINE BESYLATE 2.5 MG: 2.5 TABLET ORAL at 10:22

## 2019-03-30 RX ADMIN — HEPARIN SODIUM 5000 UNITS: 5000 INJECTION INTRAVENOUS; SUBCUTANEOUS at 13:16

## 2019-03-30 RX ADMIN — ASPIRIN 325 MG ORAL TABLET 325 MG: 325 PILL ORAL at 10:22

## 2019-03-30 RX ADMIN — CLOPIDOGREL BISULFATE 75 MG: 75 TABLET ORAL at 10:22

## 2019-03-30 RX ADMIN — FLUTICASONE PROPIONATE 1 SPRAY: 50 SPRAY, METERED NASAL at 06:12

## 2019-03-30 RX ADMIN — Medication 1 SPRAY: at 22:19

## 2019-03-30 RX ADMIN — MELATONIN 6 MG: at 22:18

## 2019-03-30 RX ADMIN — TAMSULOSIN HYDROCHLORIDE 0.4 MG: 0.4 CAPSULE ORAL at 17:29

## 2019-03-30 RX ADMIN — ATORVASTATIN CALCIUM 80 MG: 80 TABLET, FILM COATED ORAL at 17:29

## 2019-03-30 RX ADMIN — PANTOPRAZOLE SODIUM 40 MG: 40 TABLET, DELAYED RELEASE ORAL at 06:11

## 2019-03-30 RX ADMIN — NICOTINE 21 MG: 21 PATCH, EXTENDED RELEASE TRANSDERMAL at 10:44

## 2019-03-30 RX ADMIN — HEPARIN SODIUM 5000 UNITS: 5000 INJECTION INTRAVENOUS; SUBCUTANEOUS at 06:11

## 2019-03-30 RX ADMIN — HEPARIN SODIUM 5000 UNITS: 5000 INJECTION INTRAVENOUS; SUBCUTANEOUS at 22:18

## 2019-03-31 LAB
GLUCOSE SERPL-MCNC: 101 MG/DL (ref 65–140)
GLUCOSE SERPL-MCNC: 127 MG/DL (ref 65–140)
GLUCOSE SERPL-MCNC: 194 MG/DL (ref 65–140)
GLUCOSE SERPL-MCNC: 87 MG/DL (ref 65–140)

## 2019-03-31 PROCEDURE — 97530 THERAPEUTIC ACTIVITIES: CPT

## 2019-03-31 PROCEDURE — 97112 NEUROMUSCULAR REEDUCATION: CPT

## 2019-03-31 PROCEDURE — 82948 REAGENT STRIP/BLOOD GLUCOSE: CPT

## 2019-03-31 PROCEDURE — 97535 SELF CARE MNGMENT TRAINING: CPT

## 2019-03-31 PROCEDURE — 97116 GAIT TRAINING THERAPY: CPT

## 2019-03-31 RX ADMIN — ATORVASTATIN CALCIUM 80 MG: 80 TABLET, FILM COATED ORAL at 17:49

## 2019-03-31 RX ADMIN — Medication 1 SPRAY: at 17:49

## 2019-03-31 RX ADMIN — NICOTINE 21 MG: 21 PATCH, EXTENDED RELEASE TRANSDERMAL at 08:30

## 2019-03-31 RX ADMIN — PANTOPRAZOLE SODIUM 40 MG: 40 TABLET, DELAYED RELEASE ORAL at 05:55

## 2019-03-31 RX ADMIN — Medication 1 SPRAY: at 08:27

## 2019-03-31 RX ADMIN — FLUTICASONE PROPIONATE 1 SPRAY: 50 SPRAY, METERED NASAL at 06:00

## 2019-03-31 RX ADMIN — ASPIRIN 325 MG ORAL TABLET 325 MG: 325 PILL ORAL at 08:26

## 2019-03-31 RX ADMIN — HEPARIN SODIUM 5000 UNITS: 5000 INJECTION INTRAVENOUS; SUBCUTANEOUS at 22:52

## 2019-03-31 RX ADMIN — MELATONIN 6 MG: at 22:51

## 2019-03-31 RX ADMIN — AMLODIPINE BESYLATE 2.5 MG: 2.5 TABLET ORAL at 08:26

## 2019-03-31 RX ADMIN — Medication 1 SPRAY: at 22:54

## 2019-03-31 RX ADMIN — HEPARIN SODIUM 5000 UNITS: 5000 INJECTION INTRAVENOUS; SUBCUTANEOUS at 13:55

## 2019-03-31 RX ADMIN — TAMSULOSIN HYDROCHLORIDE 0.4 MG: 0.4 CAPSULE ORAL at 17:49

## 2019-03-31 RX ADMIN — HEPARIN SODIUM 5000 UNITS: 5000 INJECTION INTRAVENOUS; SUBCUTANEOUS at 05:57

## 2019-03-31 RX ADMIN — CLOPIDOGREL BISULFATE 75 MG: 75 TABLET ORAL at 08:26

## 2019-04-01 LAB
ANION GAP SERPL CALCULATED.3IONS-SCNC: 7 MMOL/L (ref 4–13)
BASOPHILS # BLD AUTO: 0.12 THOUSANDS/ΜL (ref 0–0.1)
BASOPHILS NFR BLD AUTO: 1 % (ref 0–1)
BUN SERPL-MCNC: 14 MG/DL (ref 5–25)
CALCIUM SERPL-MCNC: 9.2 MG/DL (ref 8.3–10.1)
CHLORIDE SERPL-SCNC: 108 MMOL/L (ref 100–108)
CO2 SERPL-SCNC: 24 MMOL/L (ref 21–32)
CREAT SERPL-MCNC: 0.77 MG/DL (ref 0.6–1.3)
EOSINOPHIL # BLD AUTO: 0.49 THOUSAND/ΜL (ref 0–0.61)
EOSINOPHIL NFR BLD AUTO: 5 % (ref 0–6)
ERYTHROCYTE [DISTWIDTH] IN BLOOD BY AUTOMATED COUNT: 14.2 % (ref 11.6–15.1)
GFR SERPL CREATININE-BSD FRML MDRD: 99 ML/MIN/1.73SQ M
GLUCOSE SERPL-MCNC: 105 MG/DL (ref 65–140)
GLUCOSE SERPL-MCNC: 117 MG/DL (ref 65–140)
GLUCOSE SERPL-MCNC: 198 MG/DL (ref 65–140)
GLUCOSE SERPL-MCNC: 79 MG/DL (ref 65–140)
GLUCOSE SERPL-MCNC: 99 MG/DL (ref 65–140)
HCT VFR BLD AUTO: 38.5 % (ref 36.5–49.3)
HGB BLD-MCNC: 13 G/DL (ref 12–17)
IMM GRANULOCYTES # BLD AUTO: 0.04 THOUSAND/UL (ref 0–0.2)
IMM GRANULOCYTES NFR BLD AUTO: 0 % (ref 0–2)
LYMPHOCYTES # BLD AUTO: 3.87 THOUSANDS/ΜL (ref 0.6–4.47)
LYMPHOCYTES NFR BLD AUTO: 35 % (ref 14–44)
MCH RBC QN AUTO: 33.2 PG (ref 26.8–34.3)
MCHC RBC AUTO-ENTMCNC: 33.8 G/DL (ref 31.4–37.4)
MCV RBC AUTO: 98 FL (ref 82–98)
MONOCYTES # BLD AUTO: 1.04 THOUSAND/ΜL (ref 0.17–1.22)
MONOCYTES NFR BLD AUTO: 10 % (ref 4–12)
NEUTROPHILS # BLD AUTO: 5.42 THOUSANDS/ΜL (ref 1.85–7.62)
NEUTS SEG NFR BLD AUTO: 49 % (ref 43–75)
NRBC BLD AUTO-RTO: 0 /100 WBCS
PLATELET # BLD AUTO: 352 THOUSANDS/UL (ref 149–390)
PMV BLD AUTO: 10.5 FL (ref 8.9–12.7)
POTASSIUM SERPL-SCNC: 3.9 MMOL/L (ref 3.5–5.3)
RBC # BLD AUTO: 3.92 MILLION/UL (ref 3.88–5.62)
SODIUM SERPL-SCNC: 139 MMOL/L (ref 136–145)
WBC # BLD AUTO: 10.98 THOUSAND/UL (ref 4.31–10.16)

## 2019-04-01 PROCEDURE — 80048 BASIC METABOLIC PNL TOTAL CA: CPT | Performed by: NURSE PRACTITIONER

## 2019-04-01 PROCEDURE — 97116 GAIT TRAINING THERAPY: CPT

## 2019-04-01 PROCEDURE — 97110 THERAPEUTIC EXERCISES: CPT

## 2019-04-01 PROCEDURE — 99232 SBSQ HOSP IP/OBS MODERATE 35: CPT | Performed by: PHYSICAL MEDICINE & REHABILITATION

## 2019-04-01 PROCEDURE — 97535 SELF CARE MNGMENT TRAINING: CPT

## 2019-04-01 PROCEDURE — 85025 COMPLETE CBC W/AUTO DIFF WBC: CPT | Performed by: NURSE PRACTITIONER

## 2019-04-01 PROCEDURE — 97127 HB COGNITIVE SKILLS DEVELOPMENT, EACH 15 MUNUTES: CPT

## 2019-04-01 PROCEDURE — 97112 NEUROMUSCULAR REEDUCATION: CPT

## 2019-04-01 PROCEDURE — 82948 REAGENT STRIP/BLOOD GLUCOSE: CPT

## 2019-04-01 PROCEDURE — G0515 COGNITIVE SKILLS DEVELOPMENT: HCPCS

## 2019-04-01 PROCEDURE — 97530 THERAPEUTIC ACTIVITIES: CPT

## 2019-04-01 RX ADMIN — PANTOPRAZOLE SODIUM 40 MG: 40 TABLET, DELAYED RELEASE ORAL at 05:53

## 2019-04-01 RX ADMIN — DOCUSATE SODIUM 100 MG: 100 CAPSULE, LIQUID FILLED ORAL at 18:22

## 2019-04-01 RX ADMIN — AMLODIPINE BESYLATE 2.5 MG: 2.5 TABLET ORAL at 08:29

## 2019-04-01 RX ADMIN — Medication 1 SPRAY: at 21:54

## 2019-04-01 RX ADMIN — ASPIRIN 325 MG ORAL TABLET 325 MG: 325 PILL ORAL at 08:29

## 2019-04-01 RX ADMIN — FLUTICASONE PROPIONATE 1 SPRAY: 50 SPRAY, METERED NASAL at 06:00

## 2019-04-01 RX ADMIN — TAMSULOSIN HYDROCHLORIDE 0.4 MG: 0.4 CAPSULE ORAL at 18:22

## 2019-04-01 RX ADMIN — HEPARIN SODIUM 5000 UNITS: 5000 INJECTION INTRAVENOUS; SUBCUTANEOUS at 21:53

## 2019-04-01 RX ADMIN — NICOTINE 21 MG: 21 PATCH, EXTENDED RELEASE TRANSDERMAL at 08:29

## 2019-04-01 RX ADMIN — Medication 1 SPRAY: at 11:53

## 2019-04-01 RX ADMIN — HEPARIN SODIUM 5000 UNITS: 5000 INJECTION INTRAVENOUS; SUBCUTANEOUS at 13:37

## 2019-04-01 RX ADMIN — CLOPIDOGREL BISULFATE 75 MG: 75 TABLET ORAL at 08:29

## 2019-04-01 RX ADMIN — HEPARIN SODIUM 5000 UNITS: 5000 INJECTION INTRAVENOUS; SUBCUTANEOUS at 05:53

## 2019-04-01 RX ADMIN — MELATONIN 6 MG: at 21:53

## 2019-04-01 RX ADMIN — ATORVASTATIN CALCIUM 80 MG: 80 TABLET, FILM COATED ORAL at 18:22

## 2019-04-01 RX ADMIN — Medication 1 SPRAY: at 08:29

## 2019-04-01 RX ADMIN — Medication 1 SPRAY: at 18:24

## 2019-04-02 LAB
GLUCOSE SERPL-MCNC: 110 MG/DL (ref 65–140)
GLUCOSE SERPL-MCNC: 120 MG/DL (ref 65–140)
GLUCOSE SERPL-MCNC: 147 MG/DL (ref 65–140)
GLUCOSE SERPL-MCNC: 90 MG/DL (ref 65–140)

## 2019-04-02 PROCEDURE — 97110 THERAPEUTIC EXERCISES: CPT

## 2019-04-02 PROCEDURE — 82948 REAGENT STRIP/BLOOD GLUCOSE: CPT

## 2019-04-02 PROCEDURE — 97112 NEUROMUSCULAR REEDUCATION: CPT | Performed by: OCCUPATIONAL THERAPY ASSISTANT

## 2019-04-02 PROCEDURE — 97150 GROUP THERAPEUTIC PROCEDURES: CPT | Performed by: OCCUPATIONAL THERAPY ASSISTANT

## 2019-04-02 PROCEDURE — 97530 THERAPEUTIC ACTIVITIES: CPT

## 2019-04-02 PROCEDURE — 97530 THERAPEUTIC ACTIVITIES: CPT | Performed by: OCCUPATIONAL THERAPY ASSISTANT

## 2019-04-02 PROCEDURE — G0515 COGNITIVE SKILLS DEVELOPMENT: HCPCS

## 2019-04-02 PROCEDURE — 97127 HB COGNITIVE SKILLS DEVELOPMENT, EACH 15 MUNUTES: CPT

## 2019-04-02 PROCEDURE — 97116 GAIT TRAINING THERAPY: CPT

## 2019-04-02 PROCEDURE — 97110 THERAPEUTIC EXERCISES: CPT | Performed by: OCCUPATIONAL THERAPY ASSISTANT

## 2019-04-02 RX ADMIN — Medication 1 SPRAY: at 12:11

## 2019-04-02 RX ADMIN — AMLODIPINE BESYLATE 2.5 MG: 2.5 TABLET ORAL at 09:08

## 2019-04-02 RX ADMIN — MELATONIN 6 MG: at 21:58

## 2019-04-02 RX ADMIN — TAMSULOSIN HYDROCHLORIDE 0.4 MG: 0.4 CAPSULE ORAL at 18:25

## 2019-04-02 RX ADMIN — HEPARIN SODIUM 5000 UNITS: 5000 INJECTION INTRAVENOUS; SUBCUTANEOUS at 14:25

## 2019-04-02 RX ADMIN — CLOPIDOGREL BISULFATE 75 MG: 75 TABLET ORAL at 09:08

## 2019-04-02 RX ADMIN — ATORVASTATIN CALCIUM 80 MG: 80 TABLET, FILM COATED ORAL at 18:25

## 2019-04-02 RX ADMIN — METFORMIN HYDROCHLORIDE 500 MG: 500 TABLET ORAL at 09:08

## 2019-04-02 RX ADMIN — ASPIRIN 325 MG ORAL TABLET 325 MG: 325 PILL ORAL at 09:09

## 2019-04-02 RX ADMIN — Medication 1 SPRAY: at 22:04

## 2019-04-02 RX ADMIN — FLUTICASONE PROPIONATE 1 SPRAY: 50 SPRAY, METERED NASAL at 06:49

## 2019-04-02 RX ADMIN — DOCUSATE SODIUM 100 MG: 100 CAPSULE, LIQUID FILLED ORAL at 18:25

## 2019-04-02 RX ADMIN — HEPARIN SODIUM 5000 UNITS: 5000 INJECTION INTRAVENOUS; SUBCUTANEOUS at 21:58

## 2019-04-02 RX ADMIN — NICOTINE 21 MG: 21 PATCH, EXTENDED RELEASE TRANSDERMAL at 09:12

## 2019-04-02 RX ADMIN — PANTOPRAZOLE SODIUM 40 MG: 40 TABLET, DELAYED RELEASE ORAL at 06:08

## 2019-04-02 RX ADMIN — Medication 1 SPRAY: at 18:26

## 2019-04-02 RX ADMIN — HEPARIN SODIUM 5000 UNITS: 5000 INJECTION INTRAVENOUS; SUBCUTANEOUS at 06:09

## 2019-04-03 PROBLEM — Z86.39 HISTORY OF DIABETES MELLITUS, TYPE II: Status: ACTIVE | Noted: 2019-04-03

## 2019-04-03 LAB
GLUCOSE SERPL-MCNC: 110 MG/DL (ref 65–140)
GLUCOSE SERPL-MCNC: 75 MG/DL (ref 65–140)
GLUCOSE SERPL-MCNC: 86 MG/DL (ref 65–140)
GLUCOSE SERPL-MCNC: 97 MG/DL (ref 65–140)

## 2019-04-03 PROCEDURE — 99232 SBSQ HOSP IP/OBS MODERATE 35: CPT

## 2019-04-03 PROCEDURE — 97127 HB COGNITIVE SKILLS DEVELOPMENT, EACH 15 MUNUTES: CPT

## 2019-04-03 PROCEDURE — 97150 GROUP THERAPEUTIC PROCEDURES: CPT

## 2019-04-03 PROCEDURE — 97112 NEUROMUSCULAR REEDUCATION: CPT

## 2019-04-03 PROCEDURE — 97530 THERAPEUTIC ACTIVITIES: CPT

## 2019-04-03 PROCEDURE — 82948 REAGENT STRIP/BLOOD GLUCOSE: CPT

## 2019-04-03 PROCEDURE — 97116 GAIT TRAINING THERAPY: CPT

## 2019-04-03 PROCEDURE — 97535 SELF CARE MNGMENT TRAINING: CPT

## 2019-04-03 PROCEDURE — G0515 COGNITIVE SKILLS DEVELOPMENT: HCPCS

## 2019-04-03 RX ADMIN — HEPARIN SODIUM 5000 UNITS: 5000 INJECTION INTRAVENOUS; SUBCUTANEOUS at 15:20

## 2019-04-03 RX ADMIN — PANTOPRAZOLE SODIUM 40 MG: 40 TABLET, DELAYED RELEASE ORAL at 05:36

## 2019-04-03 RX ADMIN — ASPIRIN 325 MG ORAL TABLET 325 MG: 325 PILL ORAL at 08:01

## 2019-04-03 RX ADMIN — METFORMIN HYDROCHLORIDE 500 MG: 500 TABLET ORAL at 08:01

## 2019-04-03 RX ADMIN — AMLODIPINE BESYLATE 2.5 MG: 2.5 TABLET ORAL at 08:06

## 2019-04-03 RX ADMIN — Medication 1 SPRAY: at 21:15

## 2019-04-03 RX ADMIN — ATORVASTATIN CALCIUM 80 MG: 80 TABLET, FILM COATED ORAL at 17:13

## 2019-04-03 RX ADMIN — Medication 1 SPRAY: at 11:55

## 2019-04-03 RX ADMIN — Medication 1 SPRAY: at 08:07

## 2019-04-03 RX ADMIN — HEPARIN SODIUM 5000 UNITS: 5000 INJECTION INTRAVENOUS; SUBCUTANEOUS at 21:15

## 2019-04-03 RX ADMIN — MELATONIN 6 MG: at 21:15

## 2019-04-03 RX ADMIN — FLUTICASONE PROPIONATE 1 SPRAY: 50 SPRAY, METERED NASAL at 08:07

## 2019-04-03 RX ADMIN — NICOTINE 21 MG: 21 PATCH, EXTENDED RELEASE TRANSDERMAL at 08:03

## 2019-04-03 RX ADMIN — HEPARIN SODIUM 5000 UNITS: 5000 INJECTION INTRAVENOUS; SUBCUTANEOUS at 05:35

## 2019-04-03 RX ADMIN — TAMSULOSIN HYDROCHLORIDE 0.4 MG: 0.4 CAPSULE ORAL at 17:13

## 2019-04-03 RX ADMIN — Medication 1 SPRAY: at 18:13

## 2019-04-03 RX ADMIN — CLOPIDOGREL BISULFATE 75 MG: 75 TABLET ORAL at 08:01

## 2019-04-04 LAB
ANION GAP SERPL CALCULATED.3IONS-SCNC: 4 MMOL/L (ref 4–13)
BASOPHILS # BLD AUTO: 0.14 THOUSANDS/ΜL (ref 0–0.1)
BASOPHILS NFR BLD AUTO: 2 % (ref 0–1)
BUN SERPL-MCNC: 15 MG/DL (ref 5–25)
CALCIUM SERPL-MCNC: 9.2 MG/DL (ref 8.3–10.1)
CHLORIDE SERPL-SCNC: 107 MMOL/L (ref 100–108)
CO2 SERPL-SCNC: 27 MMOL/L (ref 21–32)
CREAT SERPL-MCNC: 0.76 MG/DL (ref 0.6–1.3)
EOSINOPHIL # BLD AUTO: 0.43 THOUSAND/ΜL (ref 0–0.61)
EOSINOPHIL NFR BLD AUTO: 5 % (ref 0–6)
ERYTHROCYTE [DISTWIDTH] IN BLOOD BY AUTOMATED COUNT: 14 % (ref 11.6–15.1)
GFR SERPL CREATININE-BSD FRML MDRD: 99 ML/MIN/1.73SQ M
GLUCOSE SERPL-MCNC: 114 MG/DL (ref 65–140)
GLUCOSE SERPL-MCNC: 126 MG/DL (ref 65–140)
GLUCOSE SERPL-MCNC: 75 MG/DL (ref 65–140)
GLUCOSE SERPL-MCNC: 86 MG/DL (ref 65–140)
GLUCOSE SERPL-MCNC: 91 MG/DL (ref 65–140)
HCT VFR BLD AUTO: 38.9 % (ref 36.5–49.3)
HGB BLD-MCNC: 12.9 G/DL (ref 12–17)
IMM GRANULOCYTES # BLD AUTO: 0.03 THOUSAND/UL (ref 0–0.2)
IMM GRANULOCYTES NFR BLD AUTO: 0 % (ref 0–2)
LYMPHOCYTES # BLD AUTO: 3.29 THOUSANDS/ΜL (ref 0.6–4.47)
LYMPHOCYTES NFR BLD AUTO: 34 % (ref 14–44)
MCH RBC QN AUTO: 32.7 PG (ref 26.8–34.3)
MCHC RBC AUTO-ENTMCNC: 33.2 G/DL (ref 31.4–37.4)
MCV RBC AUTO: 99 FL (ref 82–98)
MONOCYTES # BLD AUTO: 0.92 THOUSAND/ΜL (ref 0.17–1.22)
MONOCYTES NFR BLD AUTO: 10 % (ref 4–12)
NEUTROPHILS # BLD AUTO: 4.84 THOUSANDS/ΜL (ref 1.85–7.62)
NEUTS SEG NFR BLD AUTO: 49 % (ref 43–75)
NRBC BLD AUTO-RTO: 0 /100 WBCS
PLATELET # BLD AUTO: 300 THOUSANDS/UL (ref 149–390)
PMV BLD AUTO: 10.2 FL (ref 8.9–12.7)
POTASSIUM SERPL-SCNC: 4 MMOL/L (ref 3.5–5.3)
RBC # BLD AUTO: 3.94 MILLION/UL (ref 3.88–5.62)
SODIUM SERPL-SCNC: 138 MMOL/L (ref 136–145)
WBC # BLD AUTO: 9.65 THOUSAND/UL (ref 4.31–10.16)

## 2019-04-04 PROCEDURE — 97116 GAIT TRAINING THERAPY: CPT

## 2019-04-04 PROCEDURE — 82948 REAGENT STRIP/BLOOD GLUCOSE: CPT

## 2019-04-04 PROCEDURE — 97112 NEUROMUSCULAR REEDUCATION: CPT

## 2019-04-04 PROCEDURE — 80048 BASIC METABOLIC PNL TOTAL CA: CPT | Performed by: NURSE PRACTITIONER

## 2019-04-04 PROCEDURE — 85025 COMPLETE CBC W/AUTO DIFF WBC: CPT | Performed by: NURSE PRACTITIONER

## 2019-04-04 PROCEDURE — 97530 THERAPEUTIC ACTIVITIES: CPT | Performed by: STUDENT IN AN ORGANIZED HEALTH CARE EDUCATION/TRAINING PROGRAM

## 2019-04-04 PROCEDURE — G0515 COGNITIVE SKILLS DEVELOPMENT: HCPCS

## 2019-04-04 PROCEDURE — 97530 THERAPEUTIC ACTIVITIES: CPT

## 2019-04-04 PROCEDURE — 97110 THERAPEUTIC EXERCISES: CPT

## 2019-04-04 PROCEDURE — 97110 THERAPEUTIC EXERCISES: CPT | Performed by: STUDENT IN AN ORGANIZED HEALTH CARE EDUCATION/TRAINING PROGRAM

## 2019-04-04 PROCEDURE — 99233 SBSQ HOSP IP/OBS HIGH 50: CPT

## 2019-04-04 PROCEDURE — 97127 HB COGNITIVE SKILLS DEVELOPMENT, EACH 15 MUNUTES: CPT

## 2019-04-04 RX ORDER — DOCUSATE SODIUM 100 MG/1
100 CAPSULE, LIQUID FILLED ORAL 2 TIMES DAILY PRN
Status: DISCONTINUED | OUTPATIENT
Start: 2019-04-04 | End: 2019-04-06 | Stop reason: HOSPADM

## 2019-04-04 RX ADMIN — AMLODIPINE BESYLATE 2.5 MG: 2.5 TABLET ORAL at 09:05

## 2019-04-04 RX ADMIN — Medication 1 SPRAY: at 21:34

## 2019-04-04 RX ADMIN — HEPARIN SODIUM 5000 UNITS: 5000 INJECTION INTRAVENOUS; SUBCUTANEOUS at 06:21

## 2019-04-04 RX ADMIN — Medication 1 SPRAY: at 09:09

## 2019-04-04 RX ADMIN — MELATONIN 6 MG: at 20:59

## 2019-04-04 RX ADMIN — HEPARIN SODIUM 5000 UNITS: 5000 INJECTION INTRAVENOUS; SUBCUTANEOUS at 21:33

## 2019-04-04 RX ADMIN — TAMSULOSIN HYDROCHLORIDE 0.4 MG: 0.4 CAPSULE ORAL at 17:52

## 2019-04-04 RX ADMIN — METFORMIN HYDROCHLORIDE 500 MG: 500 TABLET ORAL at 09:05

## 2019-04-04 RX ADMIN — ATORVASTATIN CALCIUM 80 MG: 80 TABLET, FILM COATED ORAL at 17:52

## 2019-04-04 RX ADMIN — CLOPIDOGREL BISULFATE 75 MG: 75 TABLET ORAL at 09:04

## 2019-04-04 RX ADMIN — PANTOPRAZOLE SODIUM 40 MG: 40 TABLET, DELAYED RELEASE ORAL at 06:21

## 2019-04-04 RX ADMIN — HEPARIN SODIUM 5000 UNITS: 5000 INJECTION INTRAVENOUS; SUBCUTANEOUS at 14:34

## 2019-04-04 RX ADMIN — ASPIRIN 325 MG ORAL TABLET 325 MG: 325 PILL ORAL at 09:05

## 2019-04-04 RX ADMIN — Medication 1 SPRAY: at 11:49

## 2019-04-04 RX ADMIN — Medication 1 SPRAY: at 17:52

## 2019-04-05 LAB
GLUCOSE SERPL-MCNC: 111 MG/DL (ref 65–140)
GLUCOSE SERPL-MCNC: 77 MG/DL (ref 65–140)
GLUCOSE SERPL-MCNC: 87 MG/DL (ref 65–140)
GLUCOSE SERPL-MCNC: 92 MG/DL (ref 65–140)

## 2019-04-05 PROCEDURE — 97127 HB COGNITIVE SKILLS DEVELOPMENT, EACH 15 MUNUTES: CPT

## 2019-04-05 PROCEDURE — 97112 NEUROMUSCULAR REEDUCATION: CPT

## 2019-04-05 PROCEDURE — 97530 THERAPEUTIC ACTIVITIES: CPT

## 2019-04-05 PROCEDURE — G0515 COGNITIVE SKILLS DEVELOPMENT: HCPCS

## 2019-04-05 PROCEDURE — 97535 SELF CARE MNGMENT TRAINING: CPT | Performed by: STUDENT IN AN ORGANIZED HEALTH CARE EDUCATION/TRAINING PROGRAM

## 2019-04-05 PROCEDURE — 97530 THERAPEUTIC ACTIVITIES: CPT | Performed by: STUDENT IN AN ORGANIZED HEALTH CARE EDUCATION/TRAINING PROGRAM

## 2019-04-05 PROCEDURE — 99232 SBSQ HOSP IP/OBS MODERATE 35: CPT

## 2019-04-05 PROCEDURE — 82948 REAGENT STRIP/BLOOD GLUCOSE: CPT

## 2019-04-05 RX ORDER — CLOPIDOGREL BISULFATE 75 MG/1
75 TABLET ORAL DAILY
Qty: 60 TABLET | Refills: 0 | Status: SHIPPED | OUTPATIENT
Start: 2019-04-06

## 2019-04-05 RX ORDER — ATORVASTATIN CALCIUM 80 MG/1
80 TABLET, FILM COATED ORAL
Qty: 30 TABLET | Refills: 0 | Status: SHIPPED | OUTPATIENT
Start: 2019-04-06 | End: 2019-10-16

## 2019-04-05 RX ORDER — TAMSULOSIN HYDROCHLORIDE 0.4 MG/1
0.4 CAPSULE ORAL
Qty: 30 CAPSULE | Refills: 0 | Status: SHIPPED | OUTPATIENT
Start: 2019-04-06 | End: 2019-04-23

## 2019-04-05 RX ORDER — NICOTINE 21 MG/24HR
1 PATCH, TRANSDERMAL 24 HOURS TRANSDERMAL DAILY
Qty: 28 PATCH | Refills: 0 | Status: SHIPPED | OUTPATIENT
Start: 2019-04-06 | End: 2019-07-23

## 2019-04-05 RX ORDER — AMLODIPINE BESYLATE 2.5 MG/1
2.5 TABLET ORAL DAILY
Qty: 30 TABLET | Refills: 0 | Status: SHIPPED | OUTPATIENT
Start: 2019-04-06 | End: 2019-10-16

## 2019-04-05 RX ORDER — ASPIRIN 325 MG
325 TABLET ORAL DAILY
Qty: 60 TABLET | Refills: 0 | Status: SHIPPED | OUTPATIENT
Start: 2019-04-06 | End: 2019-08-06 | Stop reason: CLARIF

## 2019-04-05 RX ORDER — FLUTICASONE PROPIONATE 50 MCG
1 SPRAY, SUSPENSION (ML) NASAL DAILY
Qty: 16 G | Refills: 0 | Status: SHIPPED | OUTPATIENT
Start: 2019-04-06

## 2019-04-05 RX ORDER — CHOLECALCIFEROL (VITAMIN D3) 125 MCG
5 CAPSULE ORAL
Qty: 30 TABLET | Refills: 0
Start: 2019-04-05 | End: 2019-10-16

## 2019-04-05 RX ADMIN — METFORMIN HYDROCHLORIDE 500 MG: 500 TABLET ORAL at 07:37

## 2019-04-05 RX ADMIN — AMLODIPINE BESYLATE 2.5 MG: 2.5 TABLET ORAL at 09:32

## 2019-04-05 RX ADMIN — NICOTINE 21 MG: 21 PATCH, EXTENDED RELEASE TRANSDERMAL at 09:32

## 2019-04-05 RX ADMIN — FLUTICASONE PROPIONATE 1 SPRAY: 50 SPRAY, METERED NASAL at 07:37

## 2019-04-05 RX ADMIN — HEPARIN SODIUM 5000 UNITS: 5000 INJECTION INTRAVENOUS; SUBCUTANEOUS at 05:56

## 2019-04-05 RX ADMIN — CLOPIDOGREL BISULFATE 75 MG: 75 TABLET ORAL at 09:32

## 2019-04-05 RX ADMIN — ATORVASTATIN CALCIUM 80 MG: 80 TABLET, FILM COATED ORAL at 17:12

## 2019-04-05 RX ADMIN — MELATONIN 6 MG: at 21:43

## 2019-04-05 RX ADMIN — PANTOPRAZOLE SODIUM 40 MG: 40 TABLET, DELAYED RELEASE ORAL at 05:57

## 2019-04-05 RX ADMIN — Medication 1 SPRAY: at 11:40

## 2019-04-05 RX ADMIN — ASPIRIN 325 MG ORAL TABLET 325 MG: 325 PILL ORAL at 09:32

## 2019-04-05 RX ADMIN — Medication 1 SPRAY: at 21:43

## 2019-04-05 RX ADMIN — TAMSULOSIN HYDROCHLORIDE 0.4 MG: 0.4 CAPSULE ORAL at 17:12

## 2019-04-05 RX ADMIN — Medication 1 SPRAY: at 17:13

## 2019-04-05 RX ADMIN — HEPARIN SODIUM 5000 UNITS: 5000 INJECTION INTRAVENOUS; SUBCUTANEOUS at 13:29

## 2019-04-05 RX ADMIN — Medication 1 SPRAY: at 09:33

## 2019-04-06 VITALS
RESPIRATION RATE: 18 BRPM | BODY MASS INDEX: 33.23 KG/M2 | HEIGHT: 66 IN | OXYGEN SATURATION: 99 % | HEART RATE: 87 BPM | DIASTOLIC BLOOD PRESSURE: 95 MMHG | SYSTOLIC BLOOD PRESSURE: 153 MMHG | TEMPERATURE: 97.6 F | WEIGHT: 206.79 LBS

## 2019-04-06 PROBLEM — Z91.89 AT RISK FOR VENOUS THROMBOEMBOLISM (VTE): Status: RESOLVED | Noted: 2019-03-26 | Resolved: 2019-04-06

## 2019-04-06 PROBLEM — Z95.818 STATUS POST PLACEMENT OF IMPLANTABLE LOOP RECORDER: Status: ACTIVE | Noted: 2019-04-06

## 2019-04-06 LAB — GLUCOSE SERPL-MCNC: 92 MG/DL (ref 65–140)

## 2019-04-06 PROCEDURE — 99239 HOSP IP/OBS DSCHRG MGMT >30: CPT

## 2019-04-06 PROCEDURE — 82948 REAGENT STRIP/BLOOD GLUCOSE: CPT

## 2019-04-06 RX ADMIN — Medication 1 SPRAY: at 08:03

## 2019-04-06 RX ADMIN — CLOPIDOGREL BISULFATE 75 MG: 75 TABLET ORAL at 08:03

## 2019-04-06 RX ADMIN — AMLODIPINE BESYLATE 2.5 MG: 2.5 TABLET ORAL at 08:02

## 2019-04-06 RX ADMIN — ASPIRIN 325 MG ORAL TABLET 325 MG: 325 PILL ORAL at 08:03

## 2019-04-06 RX ADMIN — PANTOPRAZOLE SODIUM 40 MG: 40 TABLET, DELAYED RELEASE ORAL at 06:21

## 2019-04-06 RX ADMIN — NICOTINE 21 MG: 21 PATCH, EXTENDED RELEASE TRANSDERMAL at 09:14

## 2019-04-06 RX ADMIN — FLUTICASONE PROPIONATE 1 SPRAY: 50 SPRAY, METERED NASAL at 06:20

## 2019-04-06 RX ADMIN — METFORMIN HYDROCHLORIDE 500 MG: 500 TABLET ORAL at 08:03

## 2019-04-08 ENCOUNTER — TELEPHONE (OUTPATIENT)
Dept: NEUROLOGY | Facility: CLINIC | Age: 59
End: 2019-04-08

## 2019-04-10 ENCOUNTER — TELEPHONE (OUTPATIENT)
Dept: NEUROLOGY | Facility: CLINIC | Age: 59
End: 2019-04-10

## 2019-04-11 ENCOUNTER — IN-CLINIC DEVICE VISIT (OUTPATIENT)
Dept: CARDIOLOGY CLINIC | Facility: CLINIC | Age: 59
End: 2019-04-11

## 2019-04-11 DIAGNOSIS — Z95.818 PRESENCE OF CARDIAC DEVICE: ICD-10-CM

## 2019-04-11 DIAGNOSIS — Z86.73 PERSONAL HISTORY OF TIA (TRANSIENT ISCHEMIC ATTACK): Primary | ICD-10-CM

## 2019-04-11 PROCEDURE — 99024 POSTOP FOLLOW-UP VISIT: CPT | Performed by: INTERNAL MEDICINE

## 2019-04-16 ENCOUNTER — APPOINTMENT (EMERGENCY)
Dept: RADIOLOGY | Facility: HOSPITAL | Age: 59
End: 2019-04-16
Payer: COMMERCIAL

## 2019-04-16 ENCOUNTER — HOSPITAL ENCOUNTER (EMERGENCY)
Facility: HOSPITAL | Age: 59
Discharge: HOME | End: 2019-04-16
Attending: EMERGENCY MEDICINE
Payer: COMMERCIAL

## 2019-04-16 ENCOUNTER — APPOINTMENT (EMERGENCY)
Dept: CT IMAGING | Facility: HOSPITAL | Age: 59
End: 2019-04-16
Payer: COMMERCIAL

## 2019-04-16 VITALS
OXYGEN SATURATION: 98 % | BODY MASS INDEX: 30.53 KG/M2 | SYSTOLIC BLOOD PRESSURE: 137 MMHG | HEIGHT: 66 IN | DIASTOLIC BLOOD PRESSURE: 68 MMHG | WEIGHT: 190 LBS | TEMPERATURE: 97.9 F | RESPIRATION RATE: 20 BRPM | HEART RATE: 92 BPM

## 2019-04-16 DIAGNOSIS — S09.90XA INJURY OF HEAD, INITIAL ENCOUNTER: ICD-10-CM

## 2019-04-16 DIAGNOSIS — S20.212A CONTUSION OF RIB ON LEFT SIDE, INITIAL ENCOUNTER: ICD-10-CM
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
## 2019-04-16 LAB
ANION GAP BLD CALC-SCNC: 18 MMOL/L (ref 4–13)
BUN BLD-MCNC: 21 MG/DL (ref 5–25)
CA-I BLD-SCNC: 1.2 MMOL/L (ref 1.12–1.32)
CHLORIDE BLD-SCNC: 103 MMOL/L (ref 100–108)
CREAT BLD-MCNC: 1 MG/DL (ref 0.6–1.3)
GFR SERPL CREATININE-BSD FRML MDRD: 81 ML/MIN/1.73SQ M
GLUCOSE SERPL-MCNC: 117 MG/DL (ref 65–140)
HCT VFR BLD CALC: 41 % (ref 36.5–49.3)
HGB BLDA-MCNC: 13.9 G/DL (ref 12–17)
PCO2 BLD: 26 MMOL/L (ref 21–32)
POTASSIUM BLD-SCNC: 3.9 MMOL/L (ref 3.5–5.3)
SODIUM BLD-SCNC: 141 MMOL/L (ref 136–145)
SPECIMEN SOURCE: ABNORMAL

## 2019-04-16 PROCEDURE — 71101 X-RAY EXAM UNILAT RIBS/CHEST: CPT

## 2019-04-16 PROCEDURE — 70450 CT HEAD/BRAIN W/O DYE: CPT

## 2019-04-16 PROCEDURE — 80047 BASIC METABLC PNL IONIZED CA: CPT

## 2019-04-16 PROCEDURE — 99284 EMERGENCY DEPT VISIT MOD MDM: CPT

## 2019-04-16 PROCEDURE — 99284 EMERGENCY DEPT VISIT MOD MDM: CPT | Performed by: EMERGENCY MEDICINE

## 2019-04-16 PROCEDURE — 85014 HEMATOCRIT: CPT

## 2019-04-16 RX ORDER — IBUPROFEN 600 MG/1
600 TABLET ORAL EVERY 6 HOURS PRN
Qty: 28 TABLET | Refills: 0 | Status: SHIPPED | OUTPATIENT
Start: 2019-04-16 | End: 2021-03-02

## 2019-04-16 RX ORDER — OXYCODONE HYDROCHLORIDE AND ACETAMINOPHEN 5; 325 MG/1; MG/1
1 TABLET ORAL EVERY 6 HOURS PRN
Qty: 9 TABLET | Refills: 0 | Status: SHIPPED | OUTPATIENT
Start: 2019-04-16 | End: 2019-04-19

## 2019-04-23 ENCOUNTER — OFFICE VISIT (OUTPATIENT)
Dept: NEUROLOGY | Facility: CLINIC | Age: 59
End: 2019-04-23
Payer: COMMERCIAL

## 2019-04-23 VITALS
DIASTOLIC BLOOD PRESSURE: 84 MMHG | HEIGHT: 66 IN | HEART RATE: 97 BPM | SYSTOLIC BLOOD PRESSURE: 164 MMHG | WEIGHT: 201 LBS | BODY MASS INDEX: 32.3 KG/M2

## 2019-04-23 VITALS
HEIGHT: 66 IN | SYSTOLIC BLOOD PRESSURE: 164 MMHG | BODY MASS INDEX: 32.3 KG/M2 | HEART RATE: 97 BPM | DIASTOLIC BLOOD PRESSURE: 84 MMHG | WEIGHT: 201 LBS

## 2019-04-23 DIAGNOSIS — Z86.73 HISTORY OF STROKE: Primary | ICD-10-CM

## 2019-04-23 DIAGNOSIS — I65.21 STENOSIS OF RIGHT CAROTID ARTERY: ICD-10-CM

## 2019-04-23 DIAGNOSIS — M19.012 ARTHROPATHY OF LEFT SHOULDER: ICD-10-CM

## 2019-04-23 DIAGNOSIS — I63.511 ACUTE ISCHEMIC RIGHT MCA STROKE (HCC): Primary | ICD-10-CM

## 2019-04-23 DIAGNOSIS — F17.211 CIGARETTE NICOTINE DEPENDENCE IN REMISSION: ICD-10-CM

## 2019-04-23 PROCEDURE — 99214 OFFICE O/P EST MOD 30 MIN: CPT | Performed by: NURSE PRACTITIONER

## 2019-04-23 PROCEDURE — 99215 OFFICE O/P EST HI 40 MIN: CPT | Performed by: PHYSICAL MEDICINE & REHABILITATION

## 2019-04-23 RX ORDER — NICOTINE 21 MG/24HR
1 PATCH, TRANSDERMAL 24 HOURS TRANSDERMAL EVERY 24 HOURS
Qty: 14 PATCH | Refills: 0 | Status: SHIPPED | OUTPATIENT
Start: 2019-04-23 | End: 2019-06-04

## 2019-04-25 ENCOUNTER — HOSPITAL ENCOUNTER (EMERGENCY)
Facility: HOSPITAL | Age: 59
Discharge: HOME | End: 2019-04-25
Attending: EMERGENCY MEDICINE
Payer: COMMERCIAL

## 2019-04-25 VITALS
HEIGHT: 66 IN | RESPIRATION RATE: 18 BRPM | SYSTOLIC BLOOD PRESSURE: 144 MMHG | OXYGEN SATURATION: 98 % | WEIGHT: 201.06 LBS | HEART RATE: 84 BPM | DIASTOLIC BLOOD PRESSURE: 91 MMHG | BODY MASS INDEX: 32.31 KG/M2 | TEMPERATURE: 98.5 F

## 2019-04-25 DIAGNOSIS — S00.12XA TRAUMATIC ECCHYMOSIS OF LEFT EYELID, INITIAL ENCOUNTER: Primary | ICD-10-CM

## 2019-04-25 PROCEDURE — 99283 EMERGENCY DEPT VISIT LOW MDM: CPT | Performed by: EMERGENCY MEDICINE

## 2019-04-25 PROCEDURE — 99283 EMERGENCY DEPT VISIT LOW MDM: CPT

## 2019-04-26 ENCOUNTER — TELEPHONE (OUTPATIENT)
Dept: NEUROLOGY | Facility: CLINIC | Age: 59
End: 2019-04-26

## 2019-04-26 DIAGNOSIS — I63.511 ACUTE ISCHEMIC RIGHT MCA STROKE (HCC): Primary | ICD-10-CM

## 2019-05-01 DIAGNOSIS — T14.8XXA BRUISING: Primary | ICD-10-CM

## 2019-05-08 DIAGNOSIS — I63.511 ACUTE ISCHEMIC CEREBROVASCULAR ACCIDENT (CVA) INVOLVING RIGHT MIDDLE CEREBRAL ARTERY TERRITORY (HCC): Primary | ICD-10-CM

## 2019-05-15 ENCOUNTER — APPOINTMENT (OUTPATIENT)
Dept: RADIOLOGY | Facility: CLINIC | Age: 59
End: 2019-05-15
Payer: COMMERCIAL

## 2019-05-15 DIAGNOSIS — M19.012 ARTHROPATHY OF LEFT SHOULDER: ICD-10-CM

## 2019-05-15 LAB
BASOPHILS # BLD AUTO: 144 CELLS/UL (ref 0–200)
BASOPHILS NFR BLD AUTO: 1.6 %
EOSINOPHIL # BLD AUTO: 603 CELLS/UL (ref 15–500)
EOSINOPHIL NFR BLD AUTO: 6.7 %
ERYTHROCYTE [DISTWIDTH] IN BLOOD BY AUTOMATED COUNT: 12.5 % (ref 11–15)
HCT VFR BLD AUTO: 45.8 % (ref 38.5–50)
HGB BLD-MCNC: 15.1 G/DL (ref 13.2–17.1)
LYMPHOCYTES # BLD AUTO: 3348 CELLS/UL (ref 850–3900)
LYMPHOCYTES NFR BLD AUTO: 37.2 %
MCH RBC QN AUTO: 32 PG (ref 27–33)
MCHC RBC AUTO-ENTMCNC: 33 G/DL (ref 32–36)
MCV RBC AUTO: 97 FL (ref 80–100)
MONOCYTES # BLD AUTO: 990 CELLS/UL (ref 200–950)
MONOCYTES NFR BLD AUTO: 11 %
NEUTROPHILS # BLD AUTO: 3915 CELLS/UL (ref 1500–7800)
NEUTROPHILS NFR BLD AUTO: 43.5 %
PLATELET # BLD AUTO: 289 THOUSAND/UL (ref 140–400)
PMV BLD REES-ECKER: 10.5 FL (ref 7.5–12.5)
RBC # BLD AUTO: 4.72 MILLION/UL (ref 4.2–5.8)
WBC # BLD AUTO: 9 THOUSAND/UL (ref 3.8–10.8)

## 2019-05-15 PROCEDURE — 73030 X-RAY EXAM OF SHOULDER: CPT

## 2019-05-30 ENCOUNTER — OFFICE VISIT (OUTPATIENT)
Dept: OCCUPATIONAL THERAPY | Facility: CLINIC | Age: 59
End: 2019-05-30
Payer: COMMERCIAL

## 2019-05-30 DIAGNOSIS — I63.511 ACUTE ISCHEMIC RIGHT MCA STROKE (HCC): Primary | ICD-10-CM

## 2019-05-30 PROCEDURE — 97165 OT EVAL LOW COMPLEX 30 MIN: CPT

## 2019-05-30 PROCEDURE — 96125 COGNITIVE TEST BY HC PRO: CPT

## 2019-05-31 ENCOUNTER — TELEPHONE (OUTPATIENT)
Dept: NEUROLOGY | Facility: CLINIC | Age: 59
End: 2019-05-31

## 2019-05-31 NOTE — TELEPHONE ENCOUNTER
Patient made aware that he scored poorly on the FTD and is advised against driving  Patient unhappy with this FTD result, doesn't believe anyone can pass this test  He will come into the office "kicking and screaming" to discuss this with Dr Abraham Mays

## 2019-05-31 NOTE — TELEPHONE ENCOUNTER
Pt calls in for his [employer] to drive results  Pt had done yesterday  Please review and advise on what message we can give pt

## 2019-05-31 NOTE — TELEPHONE ENCOUNTER
He scored poorly on the test, but it should be discussed with me in clinic next week (6/4)  I would advise against driving for now

## 2019-06-04 ENCOUNTER — OFFICE VISIT (OUTPATIENT)
Dept: NEUROLOGY | Facility: CLINIC | Age: 59
End: 2019-06-04
Payer: COMMERCIAL

## 2019-06-04 VITALS
SYSTOLIC BLOOD PRESSURE: 141 MMHG | WEIGHT: 219 LBS | HEART RATE: 72 BPM | HEIGHT: 67 IN | BODY MASS INDEX: 34.37 KG/M2 | DIASTOLIC BLOOD PRESSURE: 73 MMHG

## 2019-06-04 DIAGNOSIS — G81.90 HEMIPLEGIA OF NONDOMINANT SIDE DUE TO ACUTE STROKE (HCC): Primary | ICD-10-CM

## 2019-06-04 DIAGNOSIS — M75.52 SUBDELTOID BURSITIS OF LEFT SHOULDER JOINT: ICD-10-CM

## 2019-06-04 DIAGNOSIS — K59.00 CONSTIPATION, UNSPECIFIED CONSTIPATION TYPE: ICD-10-CM

## 2019-06-04 DIAGNOSIS — Z86.73 HISTORY OF STROKE: ICD-10-CM

## 2019-06-04 DIAGNOSIS — Z86.39 HISTORY OF DIABETES MELLITUS, TYPE II: ICD-10-CM

## 2019-06-04 DIAGNOSIS — I65.21 STENOSIS OF RIGHT CAROTID ARTERY: ICD-10-CM

## 2019-06-04 DIAGNOSIS — I63.9 HEMIPLEGIA OF NONDOMINANT SIDE DUE TO ACUTE STROKE (HCC): Primary | ICD-10-CM

## 2019-06-04 DIAGNOSIS — F17.210 CIGARETTE NICOTINE DEPENDENCE WITHOUT COMPLICATION: ICD-10-CM

## 2019-06-04 DIAGNOSIS — Z95.818 STATUS POST PLACEMENT OF IMPLANTABLE LOOP RECORDER: ICD-10-CM

## 2019-06-04 DIAGNOSIS — I10 ESSENTIAL HYPERTENSION: ICD-10-CM

## 2019-06-04 PROBLEM — M19.012 ARTHROPATHY OF LEFT SHOULDER: Status: RESOLVED | Noted: 2019-03-28 | Resolved: 2019-06-04

## 2019-06-04 PROCEDURE — 99214 OFFICE O/P EST MOD 30 MIN: CPT | Performed by: PHYSICAL MEDICINE & REHABILITATION

## 2019-06-04 PROCEDURE — 96372 THER/PROPH/DIAG INJ SC/IM: CPT | Performed by: PHYSICAL MEDICINE & REHABILITATION

## 2019-06-04 RX ORDER — TRIAMCINOLONE ACETONIDE 40 MG/ML
40 INJECTION, SUSPENSION INTRA-ARTICULAR; INTRAMUSCULAR ONCE
Status: COMPLETED | OUTPATIENT
Start: 2019-06-04 | End: 2019-06-04

## 2019-06-04 RX ORDER — LIDOCAINE HYDROCHLORIDE 10 MG/ML
4 INJECTION, SOLUTION INFILTRATION; PERINEURAL ONCE
Status: COMPLETED | OUTPATIENT
Start: 2019-06-04 | End: 2019-06-04

## 2019-06-04 RX ORDER — DOCUSATE SODIUM 250 MG
250 CAPSULE ORAL DAILY
Qty: 60 CAPSULE | Refills: 2 | Status: SHIPPED | OUTPATIENT
Start: 2019-06-04 | End: 2019-10-16

## 2019-06-04 RX ADMIN — LIDOCAINE HYDROCHLORIDE 4 ML: 10 INJECTION, SOLUTION INFILTRATION; PERINEURAL at 09:01

## 2019-06-04 RX ADMIN — TRIAMCINOLONE ACETONIDE 40 MG: 40 INJECTION, SUSPENSION INTRA-ARTICULAR; INTRAMUSCULAR at 09:02

## 2019-06-10 ENCOUNTER — TELEPHONE (OUTPATIENT)
Dept: NEUROLOGY | Facility: CLINIC | Age: 59
End: 2019-06-10

## 2019-06-10 NOTE — TELEPHONE ENCOUNTER
----- Message from Rebecca Gagnon sent at 6/10/2019  8:51 AM EDT -----  Regarding: FW: Non-Urgent Medical Question  Contact: [phone]  Dr Murray Almonte    Please review   ----- Message -----  From: Markel Santiago  Sent: 6/7/2019   4:36 PM EDT  To: Neurology [facility]  Subject: Non-Urgent Medical Question                      Dr Murray Almonte,   I have a few questions  1  traveling by car or plane, we have two vacations coming up  one is [facility] which we are planning to drive 10 to 11 hours and the other is [place] which we are going to fly  is it alright to take these vacations? 2 Can I work? I work from home  3 I will be making an appointment to do the driving test at [facility], can I take it?     Thank you Miranda Morillo

## 2019-06-10 NOTE — TELEPHONE ENCOUNTER
1  OK to drive/fly, recommended to take frequent breaks to stretch and walk, to prevent DVT  2  OK to resume work from home, no heavy machinery per patient, works on computer at home  Start half days, increase as tolerated  3  OK to take on-the-road test at [facility]  Patient expressed understanding and agreement of plan

## 2019-06-14 ENCOUNTER — TELEPHONE (OUTPATIENT)
Dept: NEUROLOGY | Facility: CLINIC | Age: 59
End: 2019-06-14

## 2019-06-14 NOTE — TELEPHONE ENCOUNTER
Received [employer] driving eval form to be filled out, please have Dr Krystyna Patino finish filling based on most recent assessment, have him sign and then please fax  Please also scan signed form into media  Thanks!

## 2019-06-19 ENCOUNTER — TELEPHONE (OUTPATIENT)
Dept: NEUROLOGY | Facility: CLINIC | Age: 59
End: 2019-06-19

## 2019-07-12 ENCOUNTER — REMOTE DEVICE CLINIC VISIT (OUTPATIENT)
Dept: CARDIOLOGY CLINIC | Facility: CLINIC | Age: 59
End: 2019-07-12
Payer: COMMERCIAL

## 2019-07-12 DIAGNOSIS — Z95.818 PRESENCE OF OTHER CARDIAC IMPLANTS AND GRAFTS: Primary | ICD-10-CM

## 2019-07-12 PROCEDURE — 93298 REM INTERROG DEV EVAL SCRMS: CPT | Performed by: INTERNAL MEDICINE

## 2019-07-12 PROCEDURE — 93299 PR REM INTERROG ICPMS/SCRMS <30 D TECH REVIEW: CPT | Performed by: INTERNAL MEDICINE

## 2019-07-12 NOTE — PROGRESS NOTES
MDT-LOOP RECORDER  CARELINK TRANSMISSION: BATTERY STATUS "OK"  1 AF NOTED, 2 MINS LONG  NO AF  AVAIL EGRAM PRESENTS AS SR W/PACS & PVCS  NOISE NOTED  OTHER EPISODES ADDRESSED  NORMAL DEVICE FUNCTION   NC

## 2019-07-23 ENCOUNTER — OFFICE VISIT (OUTPATIENT)
Dept: NEUROLOGY | Facility: CLINIC | Age: 59
End: 2019-07-23
Payer: COMMERCIAL

## 2019-07-23 VITALS
HEART RATE: 96 BPM | WEIGHT: 218.6 LBS | SYSTOLIC BLOOD PRESSURE: 130 MMHG | DIASTOLIC BLOOD PRESSURE: 100 MMHG | RESPIRATION RATE: 16 BRPM | BODY MASS INDEX: 34.31 KG/M2 | HEIGHT: 67 IN

## 2019-07-23 DIAGNOSIS — I63.9 HEMIPLEGIA OF NONDOMINANT SIDE DUE TO ACUTE STROKE (HCC): Primary | ICD-10-CM

## 2019-07-23 DIAGNOSIS — G81.90 HEMIPLEGIA OF NONDOMINANT SIDE DUE TO ACUTE STROKE (HCC): Primary | ICD-10-CM

## 2019-07-23 DIAGNOSIS — F17.210 CIGARETTE NICOTINE DEPENDENCE WITHOUT COMPLICATION: ICD-10-CM

## 2019-07-23 DIAGNOSIS — Z86.39 HISTORY OF DIABETES MELLITUS, TYPE II: ICD-10-CM

## 2019-07-23 DIAGNOSIS — I10 ESSENTIAL HYPERTENSION: ICD-10-CM

## 2019-07-23 DIAGNOSIS — Z86.73 HISTORY OF STROKE: ICD-10-CM

## 2019-07-23 PROCEDURE — 99214 OFFICE O/P EST MOD 30 MIN: CPT | Performed by: PHYSICAL MEDICINE & REHABILITATION

## 2019-07-23 RX ORDER — SILDENAFIL CITRATE 20 MG/1
TABLET ORAL
Refills: 2 | COMMUNITY
Start: 2019-07-12

## 2019-07-23 RX ORDER — SIMVASTATIN 40 MG
TABLET ORAL
COMMUNITY
Start: 2019-06-25 | End: 2021-03-02 | Stop reason: ALTCHOICE

## 2019-07-23 NOTE — PROGRESS NOTES
Physical Medicine & Rehabilitation New Patient Evaluation  Arpit Gaitan 60 y o  male      ASSESSMENT/PLAN:   Right MCA CVA - s/p mechanical thrombectomy  - continue ASA 325mg, plavix 75mg   - per Dr Eebnezer Alvarez, maintain for at least 3 months, then final duration per stroke neurology  - continue atorvastatin 80mg  - loop recorder placed 3/28  - discharged from PT/OT/SLP  - on the road driving assessment with [employer] - results reviewed, no unsafe driving behaviors demonstrated - recommend OK to drive  - continue home exercise program      Stenosis of right carotid artery  - repeat CTA on 3/19 with 40% stenosis, no need for CEA at this time per Dr Ebenezer Alvarez     HTN  - amlodipine 2 5mg daily  - SBP stable today    Left shoulder pain - likely rotator cuff impingement, subdeltoid bursitis  - XR left shoulder without significant glenohumeral arthritis, but AC joint arthritis and acromial spurring present  - subdeltoid bursa injection performed on 6/4/19, with significant improvement in pain  - stretches demonstrated to patient   - continue home exercise program    DM2  - continue metformin 500mg daily    Insomnia - improved  - melatonin PRN    Nicotine dependence  - off nicotine patch    Bowel/bladder  - constipation - encourage adequate fluid intake   - rx for colace, over the counter miralax if needed  - urinary retention - resolved    Lumbago - myofascial pain, no red flags  - continue icy-hot, low back/core exercises/stretches  - advised against excessive ibuprofen use in setting of dual antiplatelet use; advised to take tylenol PRN    *I have spent 30 minutes with Patient  today in which greater than 50% of this time was spent in counseling/coordination of care regarding Intructions for management, Importance of tx compliance, Risk factor reductions and Impressions       SUBJECTIVE:  Patient presents today in the office with chief complaint of right MCA CVA, low back pain    HPI:   Arpit Gaitan 60 y o  male, who  has a past medical history of Diabetes mellitus ([facility] [address] ), Dyslipidemia (3/26/2019), Hypertension, and Stroke [facility])  Old records were reviewed personally  He sustained stroke on 3/18/19 with left hemiparesis  He was initially admitted to [facility], where [facility] was negative for acute findings  He did not receive tPA due to being outside therapeutic window  CT head/neck revealed right M1 MCA occlusion and >70% R ICA stenosis  He underwent right MCA thrombectomy  Follow up MRI on 3/20/19 revealed punctate acute infarcts in right MCA territory, chronic lacunar infarcts in right corona radiata and left parietal temporal region  He was seen by neurology, recommended dual anti-platelet therapy, consideration for carotid endarterectomy, and loop recorder  He was admitted to acute rehab on 3/26/19 - 4/6/19  He was discharged to home with family  Functional status on discharge:  Supervision transfers, ambulation  Mod I for ADLs, cognition     Last visit on 6/4/19  At that visit, left shoulder subdeltoid bursa injection was performed  He endorses significant improvement in his left shoulder pain  He has some soreness with prolonged activity overhead movements, otherwise at rest he is asymptomatic  He continues performing shoulder and scapular exercises for ROM and strengthening at home  Since last visit, he took on the road driving assessment through [facility] and demonstrated safe driving behaviors  He drove himself to clinic today without complications  Today, he endorses significant low back pain, starting approximately 6-8 weeks ago  It is right sided, nonradiating, dull/achy, worse with prolonged walking  He has been using icy-hot and ibuprofen as needed for pain, with moderate relief  Currently, he has minimal low back pain  Denies weakness or bowel/bladder dysfunction         Imaging: I personally reviewed pertinent imaging  No new imaging on file    Expanded Social History:  Lives with spouse in 1 floor home, 2 [address]RIS    Function:   Current Level of Function:   Mod I ADLs, mobility    ROS:  No fever, chills, chest pain, SOB, cough, nausea, vomiting, diarrhea, constipation, abdominal pain, dysuria, bowel/bladder incontinence, new weakness or changes in sensation  +low back pain  Complete ROS obtained and otherwise negative        OBJECTIVE:   /100 (BP Location: Left arm, Patient Position: Sitting, Cuff Size: Adult)   Pulse 96   Resp 16   Ht 5' 6 5" (1 689 m)   Wt 99 2 kg (218 lb 9 6 oz)   BMI 34 75 kg/m²      GEN: NAD, sitting comfortably in chair  Head: NCAT, no gross lesions  Eyes: PERRL, EOMI  Throat: clear, no thrush, MMM  Pulm: CTAB, no rales/wheezes  CV: RRR, normal s1/s2  Abd: soft, NTND  Ext: no pedal edema bilaterally, distal extremities warm and well perfused  Psych: normal affect, no agitation  Skin: no observable rashes  Neuro:  A+Ox3, fluent speech, follows commands     Motor Exam:   Right Left Site Right Left Site   5 5 S Ab:  Shoulder Abductors 5 5 HF:  Hip Flexors   5 5 EF:  Elbow Flexors 5 5 KE:  Knee Extensors   5 5 EE:  Elbow Extensors 5 5 DR:  Dorsi Flexors   5 5 WE:  Wrist Extensors 5 5 EHL: Ext Uribe Longus   5 5 FF:  Finger Flexors 5 5 PF:  Plantar Flexors   5 5 HI:  Hand Intrinsics        Left shoulder active abduction, forward flexion within normal limits  Negative empty can, negative neers, negative trimble's test    Spine: nontender spinous processes, normal ROM forward flexion, negative facet loading, mild TTP right lower lumbar paraspinals; negative jae/fadir, negative SI joint compression    Labs:   Lab Results   Component Value Date    WBC 9 0 05/14/2019    HGB 15 1 05/14/2019    HCT 45 8 05/14/2019    MCV 97 0 05/14/2019     05/14/2019     Lab Results   Component Value Date    GLUCOSE 117 04/16/2019    CALCIUM 9 2 04/04/2019    K 4 0 04/04/2019    CO2 26 04/16/2019     04/04/2019    BUN 15 04/04/2019    CREATININE 0 76 04/04/2019         Past Medical History:   Diagnosis Date    Diabetes mellitus ([facility] [address] )     Dyslipidemia 3/26/2019    Hypertension     Stroke [facility])        Patient Active Problem List    Diagnosis Date Noted    Status post placement of implantable loop recorder 04/06/2019    History of diabetes mellitus, type II 04/03/2019    Adjustment reaction with anxiety 03/29/2019    Insomnia 03/29/2019    Fall 03/28/2019    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 ([facility] [address] ) 03/28/2019    Constipation 03/27/2019    Urinary retention 03/27/2019    Hypertriglyceridemia 03/26/2019    Nicotine dependence 03/26/2019    Stenosis of right carotid artery 03/26/2019    Presbyopia 03/26/2019    History of stroke 03/19/2019    Headache 03/19/2019    Hypertension 03/19/2019    GERD (gastroesophageal reflux disease) 03/19/2019       Past Surgical History:   Procedure Laterality Date    BACK SURGERY      IR STROKE ALERT  3/19/2019    SHOULDER SURGERY         No family history on file      Social History   Prior smoker    No Known Allergies      Current Outpatient Medications:     amLODIPine (NORVASC) 2 5 mg tablet, Take 1 tablet (2 5 mg total) by mouth daily, Disp: 30 tablet, Rfl: 0    aspirin 325 mg tablet, Take 1 tablet (325 mg total) by mouth daily, Disp: 60 tablet, Rfl: 0    atorvastatin (LIPITOR) 80 mg tablet, Take 1 tablet (80 mg total) by mouth daily after dinner, Disp: 30 tablet, Rfl: 0    clopidogrel (PLAVIX) 75 mg tablet, Take 1 tablet (75 mg total) by mouth daily, Disp: 60 tablet, Rfl: 0    docusate sodium (COLACE) 250 MG capsule, Take 1 capsule (250 mg total) by mouth daily, Disp: 60 capsule, Rfl: 2    fluticasone (FLONASE) 50 mcg/act nasal spray, 1 spray into each nostril daily, Disp: 16 g, Rfl: 0    ibuprofen (MOTRIN) 600 mg tablet, Take 1 tablet (600 mg total) by mouth every 6 (six) hours as needed for mild pain for up to 7 days, Disp: 28 tablet, Rfl: 0    melatonin 5 MG TABS, Take 1 tablet (5 mg total) by mouth daily at bedtime, Disp: 30 tablet, Rfl: 0    metFORMIN (GLUCOPHAGE) 500 mg tablet, Take 1 tablet (500 mg total) by mouth daily with breakfast, Disp: 30 tablet, Rfl: 0    omeprazole (PriLOSEC OTC) 20 MG tablet, Take 20 mg by mouth daily, Disp: , Rfl:     sildenafil (REVATIO) 20 mg tablet, TAKE 2 3 TABLETS BY MOUTH AS NEEDED FOR SEXUAL ACTIVITY, Disp: , Rfl: 2    simvastatin (ZOCOR) 40 mg tablet, , Disp: , Rfl:

## 2019-08-06 ENCOUNTER — OFFICE VISIT (OUTPATIENT)
Dept: NEUROLOGY | Facility: CLINIC | Age: 59
End: 2019-08-06
Payer: COMMERCIAL

## 2019-08-06 VITALS
BODY MASS INDEX: 34.37 KG/M2 | HEART RATE: 74 BPM | HEIGHT: 67 IN | WEIGHT: 219 LBS | SYSTOLIC BLOOD PRESSURE: 140 MMHG | DIASTOLIC BLOOD PRESSURE: 86 MMHG

## 2019-08-06 DIAGNOSIS — I65.21 STENOSIS OF RIGHT CAROTID ARTERY: ICD-10-CM

## 2019-08-06 DIAGNOSIS — I63.9 HEMIPLEGIA OF NONDOMINANT SIDE DUE TO ACUTE STROKE (HCC): Primary | ICD-10-CM

## 2019-08-06 DIAGNOSIS — I10 ESSENTIAL HYPERTENSION: ICD-10-CM

## 2019-08-06 DIAGNOSIS — G81.90 HEMIPLEGIA OF NONDOMINANT SIDE DUE TO ACUTE STROKE (HCC): Primary | ICD-10-CM

## 2019-08-06 PROBLEM — I69.30 CHRONIC ISCHEMIC RIGHT MCA STROKE: Status: ACTIVE | Noted: 2019-08-06

## 2019-08-06 PROBLEM — Z86.73 CHRONIC ISCHEMIC RIGHT MCA STROKE: Status: ACTIVE | Noted: 2019-08-06

## 2019-08-06 PROCEDURE — 99214 OFFICE O/P EST MOD 30 MIN: CPT | Performed by: PSYCHIATRY & NEUROLOGY

## 2019-08-06 RX ORDER — METOPROLOL SUCCINATE 100 MG
100 TABLET, EXTENDED RELEASE 24 HR ORAL DAILY
COMMUNITY
Start: 2019-07-31

## 2019-08-06 RX ORDER — TAMSULOSIN HYDROCHLORIDE 0.4 MG/1
0.4 CAPSULE ORAL DAILY
COMMUNITY
End: 2019-10-16

## 2019-08-06 NOTE — PROGRESS NOTES
Patient ID: Criss Corona is a 60 y o  male  Assessment/Plan:    59 y/o M who is here as a follow up for R MCA stroke  Patient is s/p mechanical thrombectomy  No new TIA/CVA like symptoms  Patient has right carotid stenosis which is asymptomatic  He is still on aspirin/plavix for stroke prevention  He is tolerating his medications  He does have bleeding/bruising issues with DAPT  PLAN:  -c/w with combination of Plavix, will discontinue aspirin today, and continue with lipitor for secondary stroke prevention  -Continue with BP goal < 130/80, BP today is at goal currently    -Continue to monitor DM and appropriate Euglycemic control  -Defer to PCP regarding DM and BP management  -recommend PCP check LDL levels, goal LDL <70  -does not smoke  -denies any history of snoring    -I advised patient to avoid using NSAIDs for headaches or other pain and instead just stick to tylenol    -I educated regarding mediterranean style diet and regular exercise regimen of brisk walking atleast 4-5 times a week  Literature given  -I educated patient and family regarding medication compliance and stroke risk factor modifications      -I would like to follow up in 1 year  I would be happy to see the patient sooner if any new questions/concerns arise  Patient/Guardian was advised to the call the office if they have any questions and concerns in the meantime  Patient/Guardian does understand that if they have any new stroke like symptoms such as facial droop on one side, weakness/paralysis on either side, speech trouble, numbness on one side, balance issues, any vision changes, extreme dizziness or any new headache, to call 9-1-1 immediately or to proceed to the nearest ER immediately   ,        Problem List Items Addressed This Visit        Cardiovascular and Mediastinum    Hypertension    Relevant Medications    TOPROL  MG 24 hr tablet    Stenosis of right carotid artery    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 [facility]) - Primary             Subjective:    HPI    59 y/o Male who is here as a follow up for right MCA stroke  He is s/p mechanical thrombectomy  He was on DAPT with aspirin and plavix and atorvastatin for stroke prevention  He does have bleeding/bruising issues with his current medications  He still has issues with L side  He finished PT and OT few months ago  He occasionally will drop things with the L side  He is left handed so sometimes it is challenging he states  He is complaint with his medications  He denies any new TIA/CVA like symptoms  He says with excessive activity, he does have some fatigue, and does get tired, and does drag his left foot  The following portions of the patient's history were reviewed and updated as appropriate:   He  has a past medical history of Diabetes mellitus ([facility] [address] ), Dyslipidemia (3/26/2019), Hypertension, and Stroke ([facility] )  He   Patient Active Problem List    Diagnosis Date Noted    Chronic ischemic right MCA stroke 08/06/2019    Status post placement of implantable loop recorder 04/06/2019    History of diabetes mellitus, type II 04/03/2019    Adjustment reaction with anxiety 03/29/2019    Insomnia 03/29/2019    Fall 03/28/2019    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 ([facility] [address] ) 03/28/2019    Constipation 03/27/2019    Urinary retention 03/27/2019    Hypertriglyceridemia 03/26/2019    Nicotine dependence 03/26/2019    Stenosis of right carotid artery 03/26/2019    Presbyopia 03/26/2019    History of stroke 03/19/2019    Headache 03/19/2019    Hypertension 03/19/2019    GERD (gastroesophageal reflux disease) 03/19/2019     He  has a past surgical history that includes Back surgery; Shoulder surgery; and IR stroke alert (3/19/2019)  His family history includes Diabetes in his mother; Heart attack in his mother; Prostate cancer in his father  He  reports that he has quit smoking   He has never used smokeless tobacco  He reports that he drank alcohol  He reports that he does not use drugs  Current Outpatient Medications   Medication Sig Dispense Refill    amLODIPine (NORVASC) 2 5 mg tablet Take 1 tablet (2 5 mg total) by mouth daily 30 tablet 0    atorvastatin (LIPITOR) 80 mg tablet Take 1 tablet (80 mg total) by mouth daily after dinner 30 tablet 0    clopidogrel (PLAVIX) 75 mg tablet Take 1 tablet (75 mg total) by mouth daily 60 tablet 0    docusate sodium (COLACE) 250 MG capsule Take 1 capsule (250 mg total) by mouth daily 60 capsule 2    fluticasone (FLONASE) 50 mcg/act nasal spray 1 spray into each nostril daily 16 g 0    melatonin 5 MG TABS Take 1 tablet (5 mg total) by mouth daily at bedtime 30 tablet 0    metFORMIN (GLUCOPHAGE) 500 mg tablet Take 1 tablet (500 mg total) by mouth daily with breakfast 30 tablet 0    omeprazole (PriLOSEC OTC) 20 MG tablet Take 20 mg by mouth daily      sildenafil (REVATIO) 20 mg tablet TAKE 2 3 TABLETS BY MOUTH AS NEEDED FOR SEXUAL ACTIVITY  2    simvastatin (ZOCOR) 40 mg tablet       tamsulosin (FLOMAX) 0 4 mg Take 0 4 mg by mouth daily      TOPROL  MG 24 hr tablet       ibuprofen (MOTRIN) 600 mg tablet Take 1 tablet (600 mg total) by mouth every 6 (six) hours as needed for mild pain for up to 7 days 28 tablet 0     No current facility-administered medications for this visit        Current Outpatient Medications on File Prior to Visit   Medication Sig    amLODIPine (NORVASC) 2 5 mg tablet Take 1 tablet (2 5 mg total) by mouth daily    atorvastatin (LIPITOR) 80 mg tablet Take 1 tablet (80 mg total) by mouth daily after dinner    clopidogrel (PLAVIX) 75 mg tablet Take 1 tablet (75 mg total) by mouth daily    docusate sodium (COLACE) 250 MG capsule Take 1 capsule (250 mg total) by mouth daily    fluticasone (FLONASE) 50 mcg/act nasal spray 1 spray into each nostril daily    melatonin 5 MG TABS Take 1 tablet (5 mg total) by mouth daily at bedtime    metFORMIN (GLUCOPHAGE) 500 mg tablet Take 1 tablet (500 mg total) by mouth daily with breakfast    omeprazole (PriLOSEC OTC) 20 MG tablet Take 20 mg by mouth daily    sildenafil (REVATIO) 20 mg tablet TAKE 2 3 TABLETS BY MOUTH AS NEEDED FOR SEXUAL ACTIVITY    simvastatin (ZOCOR) 40 mg tablet     tamsulosin (FLOMAX) 0 4 mg Take 0 4 mg by mouth daily    TOPROL  MG 24 hr tablet     [DISCONTINUED] aspirin 325 mg tablet Take 1 tablet (325 mg total) by mouth daily    ibuprofen (MOTRIN) 600 mg tablet Take 1 tablet (600 mg total) by mouth every 6 (six) hours as needed for mild pain for up to 7 days     No current facility-administered medications on file prior to visit  He has No Known Allergies  Objective:    Blood pressure 140/86, pulse 74, height 5' 6 5" (1 689 m), weight 99 3 kg (219 lb)  Physical Exam  General - Patient is alert, awake, follows commands  Speech - no dysarthria noted, no aphasia noted  Neuro:   Cranial nerves: PERRL, EOMI, no facial droop noted, facial sensation intact, tongue midline  Motor: mild weakness noted on the L side  Sensory - intact to soft touch throughout  Gait - hemiparetic gait noted on L side  ROS:  I personally reviewed ROS  Review of Systems   Constitutional: Negative  Negative for appetite change and fever  HENT: Negative  Negative for hearing loss, tinnitus, trouble swallowing and voice change  Eyes: Negative  Negative for photophobia and pain  Respiratory: Negative  Negative for shortness of breath  Cardiovascular: Negative  Negative for palpitations  Gastrointestinal: Negative  Negative for nausea and vomiting  Endocrine: Negative  Negative for cold intolerance and heat intolerance  Genitourinary: Negative  Negative for dysuria, frequency and urgency  Musculoskeletal: Negative  Negative for myalgias and neck pain  Skin: Negative  Negative for rash  Neurological: Negative    Negative for dizziness, tremors, seizures, syncope, facial asymmetry, speech difficulty, weakness, light-headedness, numbness and headaches  Hematological: Negative  Does not bruise/bleed easily  Psychiatric/Behavioral: Negative  Negative for confusion, hallucinations and sleep disturbance

## 2019-08-14 ENCOUNTER — TELEPHONE (OUTPATIENT)
Dept: NEUROLOGY | Facility: CLINIC | Age: 59
End: 2019-08-14

## 2019-08-14 NOTE — TELEPHONE ENCOUNTER
Received PT/OT/ST forms for patient  PT/OT ordered by Dr Awilda Jorge  Originally ordered by Dr Britta Mcknena inpatient  Forms emailed to Iman Marshall for Dr Niecy Arevalo  Signed forms can be faxed back to [phone]  Thanks!

## 2019-10-10 ENCOUNTER — REMOTE DEVICE CLINIC VISIT (OUTPATIENT)
Dept: CARDIOLOGY CLINIC | Facility: CLINIC | Age: 59
End: 2019-10-10
Payer: COMMERCIAL

## 2019-10-10 DIAGNOSIS — Z86.73 PERSONAL HISTORY OF TRANSIENT ISCHEMIC ATTACK: Primary | ICD-10-CM

## 2019-10-10 DIAGNOSIS — Z95.818 PRESENCE OF OTHER CARDIAC IMPLANTS AND GRAFTS: ICD-10-CM

## 2019-10-10 PROCEDURE — 93299 PR REM INTERROG ICPMS/SCRMS <30 D TECH REVIEW: CPT | Performed by: INTERNAL MEDICINE

## 2019-10-10 PROCEDURE — 93298 REM INTERROG DEV EVAL SCRMS: CPT | Performed by: INTERNAL MEDICINE

## 2019-10-10 NOTE — PROGRESS NOTES
MDT-LOOP RECORDER  CARELINK TRANSMISSION: LOOP RECORDER  PRESENTING RHYTHM NSR @ 85 BPM  BATTERY STATUS "OK"  [facility]  NO PATIENT ACTIVATED EPISODES  NORMAL DEVICE FUNCTION   DL

## 2019-10-16 ENCOUNTER — OFFICE VISIT (OUTPATIENT)
Dept: NEUROLOGY | Facility: CLINIC | Age: 59
End: 2019-10-16
Payer: COMMERCIAL

## 2019-10-16 VITALS
HEART RATE: 82 BPM | SYSTOLIC BLOOD PRESSURE: 183 MMHG | WEIGHT: 225 LBS | BODY MASS INDEX: 35.31 KG/M2 | HEIGHT: 67 IN | DIASTOLIC BLOOD PRESSURE: 79 MMHG

## 2019-10-16 DIAGNOSIS — I10 ESSENTIAL HYPERTENSION: ICD-10-CM

## 2019-10-16 DIAGNOSIS — I69.30 CHRONIC ISCHEMIC RIGHT MCA STROKE: ICD-10-CM

## 2019-10-16 DIAGNOSIS — M48.062 SPINAL STENOSIS OF LUMBAR REGION WITH NEUROGENIC CLAUDICATION: Primary | ICD-10-CM

## 2019-10-16 PROCEDURE — 99214 OFFICE O/P EST MOD 30 MIN: CPT | Performed by: PHYSICAL MEDICINE & REHABILITATION

## 2019-10-16 NOTE — PROGRESS NOTES
Physical Medicine & Rehabilitation Follow up Patient Evaluation  Jerlyn Leyden 60 y o  male      ASSESSMENT/PLAN:   Neurogenic claudication   - also with history of lumbar laminectomy   - will check MRI lumbar spine  - follow up after MRI lumbar spine, if significant central canal stenosis will refer to neurosurgery  - continue home exercise program     Right MCA CVA - s/p mechanical thrombectomy  - continue plavix; aspirin discontinued by neurology  - continue atorvastatin 80mg  - loop recorder placed 3/28  - discharged from PT/OT/SLP  - on the road driving assessment with GS - results reviewed, no unsafe driving behaviors demonstrated - recommend OK to drive  - continue home exercise program       Stenosis of right carotid artery  - repeat CTA on 3/19 with 40% stenosis, no need for CEA at this time per Dr Mari Palacios     HTN  - continue metoprolol   - elevated today, per patient had significant stress today due to dogs throwing up - advised to repeat BP check at home, and call PCP if still elevated    Left shoulder pain - likely rotator cuff impingement, subdeltoid bursitis  - XR left shoulder without significant glenohumeral arthritis, but AC joint arthritis and acromial spurring present  - subdeltoid bursa injection performed on 6/4/19, with significant improvement in pain  - continue home exercise program   - defer repeat steroid injection at this time    DM2  - continue metformin 500mg daily    Insomnia - improved  - melatonin PRN    Nicotine dependence  - off nicotine patch    Bowel/bladder  - constipation - encourage adequate fluid intake   - rx for colace, over the counter miralax if needed  - urinary retention - resolved    Lumbago - myofascial pain, no red flags  - continue icy-hot, low back/core exercises/stretches  - advised against excessive ibuprofen use in setting of dual antiplatelet use; advised to take tylenol PRN    **I have spent 25 minutes with Patient and family today in which greater than 50% of this time was spent in counseling/coordination of care regarding Prognosis, Intructions for management, Importance of tx compliance and Impressions  SUBJECTIVE:  Patient presents today in the office with chief complaint of right MCA CVA, low back pain, follow up visit     HPI:   Radha Cardona 60 y o  male, who  has a past medical history of Diabetes mellitus ([facility] [address] ), Dyslipidemia (3/26/2019), Hypertension, and Stroke [facility])  Old records were reviewed personally  Last seen on 7/23/19  He is followed for right MCA CVA, low back pain  He also had left subdeltoid corticosteroid injection in June 2019  At last visit, he had been discharged from therapies  He was seen by OT for  eval and recommended safe to drive  In interim, he reports onset of lower extremity fatigue, weakness, diffuse  It started in Aug 2019, approximately 8 weeks ago  He reports good and bad days, where some days he has significant difficulty walking up and down his driveway  He reports near resolution of symptoms after sitting for 5-10 minutes, but symptoms inevitably return after walking for a few minutes  He denies bowel/bladder dysfunction  He denies low back pain radiating to the lower extremities  Of note, he did have lumbar laminectomy in remote past (more than 20 years ago per patient)  No recent MRI imaging available in system to review  Denies numbness/tingling in toes  From previous visits:   He sustained stroke on 3/18/19 with left hemiparesis  He was initially admitted to [facility], where [address] [place] [address] Street was negative for acute findings  He did not receive tPA due to being outside therapeutic window  CT head/neck revealed right M1 MCA occlusion and >70% R ICA stenosis  He underwent right MCA thrombectomy  Follow up MRI on 3/20/19 revealed punctate acute infarcts in right MCA territory, chronic lacunar infarcts in right corona radiata and left parietal temporal region   He was seen by neurology, recommended dual anti-platelet therapy, consideration for carotid endarterectomy, and loop recorder  He was admitted to acute rehab on 3/26/19 - 4/6/19  Imaging: I personally reviewed pertinent imaging  No new imaging on file    Expanded Social History:  Lives with spouse in 1 floor home, 2 CHRIS    Function:   Current Level of Function:   Mod I ADLs, mobility    ROS:  No fever, chills, chest pain, SOB, cough, nausea, vomiting, diarrhea, constipation, abdominal pain, dysuria, bowel/bladder incontinence, new weakness or changes in sensation  +fatigue, weakness in lower extremities  Complete ROS obtained and otherwise negative        OBJECTIVE:   BP (!) 183/79 (BP Location: Left arm, Patient Position: Sitting, Cuff Size: Large)   Pulse 82   Ht 5' 6 5" (1 689 m)   Wt 102 kg (225 lb)   BMI 35 77 kg/m²      GEN: NAD, sitting comfortably in chair  Head: NCAT, no gross lesions  Eyes: PERRL, EOMI  Throat: clear, no thrush, MMM  Pulm: CTAB, no rales/wheezes  CV: RRR, normal s1/s2  Abd: soft, NTND  Ext: no pedal edema bilaterally, distal extremities warm and well perfused  Psych: normal affect, no agitation  Skin: no observable rashes  Neuro:  A+Ox3, fluent speech, follows commands     Motor Exam:   Right Left Site Right Left Site   5 5 S Ab:  Shoulder Abductors 5 5 HF:  Hip Flexors   5 5 EF:  Elbow Flexors 5 5 KE:  Knee Extensors   5 5 EE:  Elbow Extensors 5 5 DR:  Dorsi Flexors   5 5 WE:  Wrist Extensors 5 5 EHL: Ext Uribe Longus   5 5 FF:  Finger Flexors 5 5 PF:  Plantar Flexors   5 5 HI:  Hand Intrinsics        Negative babinski bilateral  Normal gait pattern, some difficulty performing tandem gait  Negative straight leg raise bilateral    Labs:   Lab Results   Component Value Date    WBC 9 0 05/14/2019    HGB 15 1 05/14/2019    HCT 45 8 05/14/2019    MCV 97 0 05/14/2019     05/14/2019     Lab Results   Component Value Date    GLUCOSE 117 04/16/2019    CALCIUM 9 2 04/04/2019    K 4 0 04/04/2019    CO2 26 04/16/2019     04/04/2019 BUN 15 04/04/2019    CREATININE 0 76 04/04/2019         Past Medical History:   Diagnosis Date    Diabetes mellitus ([facility] [address] )     Dyslipidemia 3/26/2019    Hypertension     Stroke [facility])        Patient Active Problem List    Diagnosis Date Noted    Chronic ischemic right MCA stroke 08/06/2019    Status post placement of implantable loop recorder 04/06/2019    History of diabetes mellitus, type II 04/03/2019    Adjustment reaction with anxiety 03/29/2019    Insomnia 03/29/2019    Fall 03/28/2019    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 ([facility] [address] ) 03/28/2019    Constipation 03/27/2019    Urinary retention 03/27/2019    Hypertriglyceridemia 03/26/2019    Nicotine dependence 03/26/2019    Stenosis of right carotid artery 03/26/2019    Presbyopia 03/26/2019    History of stroke 03/19/2019    Headache 03/19/2019    Hypertension 03/19/2019    GERD (gastroesophageal reflux disease) 03/19/2019       Past Surgical History:   Procedure Laterality Date    BACK SURGERY      IR STROKE ALERT  3/19/2019    SHOULDER SURGERY         Family History   Problem Relation Age of Onset    Heart attack Mother     Diabetes Mother     Prostate cancer Father        Social History   Prior smoker    No Known Allergies      Current Outpatient Medications:     clopidogrel (PLAVIX) 75 mg tablet, Take 1 tablet (75 mg total) by mouth daily, Disp: 60 tablet, Rfl: 0    fluticasone (FLONASE) 50 mcg/act nasal spray, 1 spray into each nostril daily, Disp: 16 g, Rfl: 0    ibuprofen (MOTRIN) 600 mg tablet, Take 1 tablet (600 mg total) by mouth every 6 (six) hours as needed for mild pain for up to 7 days, Disp: 28 tablet, Rfl: 0    metFORMIN (GLUCOPHAGE) 500 mg tablet, Take 1 tablet (500 mg total) by mouth daily with breakfast, Disp: 30 tablet, Rfl: 0    omeprazole (PriLOSEC OTC) 20 MG tablet, Take 20 mg by mouth daily, Disp: , Rfl:     sildenafil (REVATIO) 20 mg tablet, TAKE 2 3 TABLETS BY MOUTH AS NEEDED FOR SEXUAL ACTIVITY, Disp: , Rfl: 2    simvastatin (ZOCOR) 40 mg tablet, , Disp: , Rfl:     TOPROL  MG 24 hr tablet, , Disp: , Rfl:

## 2019-10-28 ENCOUNTER — TELEPHONE (OUTPATIENT)
Dept: NEUROLOGY | Facility: CLINIC | Age: 59
End: 2019-10-28

## 2019-10-28 NOTE — TELEPHONE ENCOUNTER
----- Message from Ban Bain MD sent at 10/28/2019 10:38 AM EDT -----  Regarding: MRI lumbar spine  Yong Gentile,    I replied to the peer to peer message you sent, but not sure if you got it  MRI lumbar spine approved until Nov 23, 2019 - could you call him to make sure it is scheduled before then?     [facility] #[id number]

## 2019-11-01 ENCOUNTER — HOSPITAL ENCOUNTER (OUTPATIENT)
Dept: MRI IMAGING | Facility: HOSPITAL | Age: 59
Discharge: HOME | End: 2019-11-01
Attending: PHYSICAL MEDICINE & REHABILITATION
Payer: COMMERCIAL

## 2019-11-01 DIAGNOSIS — M48.062 SPINAL STENOSIS OF LUMBAR REGION WITH NEUROGENIC CLAUDICATION: ICD-10-CM

## 2019-11-01 PROCEDURE — 72148 MRI LUMBAR SPINE W/O DYE: CPT

## 2019-11-06 ENCOUNTER — OFFICE VISIT (OUTPATIENT)
Dept: NEUROLOGY | Facility: CLINIC | Age: 59
End: 2019-11-06
Payer: COMMERCIAL

## 2019-11-06 VITALS
HEART RATE: 82 BPM | BODY MASS INDEX: 35.47 KG/M2 | WEIGHT: 226 LBS | DIASTOLIC BLOOD PRESSURE: 85 MMHG | SYSTOLIC BLOOD PRESSURE: 157 MMHG | HEIGHT: 67 IN

## 2019-11-06 DIAGNOSIS — Z86.73 HISTORY OF STROKE: Primary | ICD-10-CM

## 2019-11-06 DIAGNOSIS — I73.9 CLAUDICATION OF BOTH LOWER EXTREMITIES (HCC): ICD-10-CM

## 2019-11-06 PROCEDURE — 99214 OFFICE O/P EST MOD 30 MIN: CPT | Performed by: PHYSICAL MEDICINE & REHABILITATION

## 2019-11-06 RX ORDER — PHENOL 1.4 %
20 AEROSOL, SPRAY (ML) MUCOUS MEMBRANE AS NEEDED
COMMUNITY

## 2019-11-06 NOTE — PROGRESS NOTES
Physical Medicine & Rehabilitation Follow up Patient Evaluation  Tisha Calle 60 y o  male      ASSESSMENT/PLAN:   Weakness, fatigue of bilateral lower extremities with exertion   - MRI lumbar spine negative for significant central canal stenosis  - possible vascular claudication - will check arterial dopplers bilateral lower extremities + HERRERA  - moderate foraminal stenosis noted, but no radicular signs and unlikely source of fatigue   - consider EMG if vascular studies unremarkable - continue home exercise program daily    Right MCA CVA - s/p mechanical thrombectomy  - continue plavix; aspirin discontinued by neurology  - continue atorvastatin 80mg  - discharged from PT/OT/SLP  - on the road driving assessment with GS - results reviewed, no unsafe driving behaviors demonstrated - recommend OK to drive  - continue home exercise program       Stenosis of right carotid artery  - repeat CTA on 3/19 with 40% stenosis, no need for CEA at this time per Dr Nikhil Wadsworth     HTN  - continue metoprolol     Left shoulder pain - rotator cuff impingement, subdeltoid bursitis - improved  - XR left shoulder without significant glenohumeral arthritis, but AC joint arthritis and acromial spurring present  - subdeltoid bursa injection performed on 6/4/19, with significant improvement in pain  - continue home exercise program   - defer repeat steroid injection at this time    DM2  - continue metformin 500mg daily    Insomnia - improved  - melatonin PRN    Nicotine dependence  - off nicotine patch    Bowel/bladder  - constipation - encourage adequate fluid intake   - rx for colace, over the counter miralax if needed  - urinary retention - resolved    Lumbago - myofascial pain, no red flags - improved  - continue icy-hot, low back/core exercises/stretches  - advised against excessive ibuprofen use in setting of antiplatelet use; advised to take tylenol PRN    *I have spent 30 minutes with Patient  today in which greater than 50% of this time was spent in counseling/coordination of care regarding Diagnostic results, Intructions for management, Risk factor reductions and Impressions  SUBJECTIVE:  Patient presents today in the office with chief complaint of right MCA CVA, lower extremity weakness/fatigue    HPI:   Mitchell Dunn 60 y o  male, who  has a past medical history of Diabetes mellitus ([facility] [address] ), Dyslipidemia (3/26/2019), Hypertension, and Stroke ([facility] [address] )  Old records were reviewed personally  At last visit on 10/16/19, patient endorses bilateral lower extremity fatigue and weakness  He reports onset approximately 3 months ago  It is worse with exertion, to the point of having difficulty walking up and down driveway  MRI lumbar spine was ordered to evaluate for central canal stenosis, but did not show significant stenosis  He denies radiating low back pain at this time, or numbness/tingling in the lower extremities  He denies cold distal extremities or color change in the toes  He denies bowel/bladder dysfunction  Of note, he does report some improvement in his fatigue since last month  No other symptoms of myalgia, rash  From previous visits:   He sustained stroke on 3/18/19 with left hemiparesis  He was initially admitted to [facility], where [facility] was negative for acute findings  He did not receive tPA due to being outside therapeutic window  CT head/neck revealed right M1 MCA occlusion and >70% R ICA stenosis  He underwent right MCA thrombectomy  Follow up MRI on 3/20/19 revealed punctate acute infarcts in right MCA territory, chronic lacunar infarcts in right corona radiata and left parietal temporal region  He was seen by neurology, recommended dual anti-platelet therapy, consideration for carotid endarterectomy, and loop recorder  He was admitted to acute rehab on 3/26/19 - 4/6/19           Imaging: I personally reviewed pertinent imaging     L1-L2:  Circumferential bulge with thin left paramedian the noncompressive protrusion  Marginal osteophytes and moderate facet arthrosis  No compressive disease      L2-L3:  Mild bilateral facet arthrosis, minor circumferential bulge, small marginal osteophytes      L3-L4:  Decreased disc height, minor degenerative anterolisthesis with the moderately advanced bilateral facet arthrosis  Minor central noncompressive protrusion      L4-L5:  Decreased disc height, circumferential bulge  Disc extends into both foramen without definite L4 compression  Moderate bilateral facet arthrosis, no critical stenosis      L5-S1:  Remote left laminectomy  No evidence for disc recurrence  Circumferential bulge with marginal osteophytes  Disc extends into both foramen with moderate left foraminal stenosis  Moderately advanced bilateral facet arthrosis        Expanded Social History:  Lives with spouse in 1 floor home, 2 [address]RIS    Function:   Current Level of Function:   Mod I ADLs, mobility    ROS:  No fever, chills, chest pain, SOB, cough, nausea, vomiting, diarrhea, constipation, abdominal pain, dysuria, bowel/bladder incontinence, new weakness or changes in sensation  +fatigue, weakness in lower extremities  Complete ROS obtained and otherwise negative        OBJECTIVE:   /85 (BP Location: Left arm, Patient Position: Sitting, Cuff Size: Large)   Pulse 82   Ht 5' 6 5" (1 689 m)   Wt 103 kg (226 lb)   BMI 35 93 kg/m²      GEN: NAD, sitting comfortably in chair  Head: NCAT, no gross lesions  Eyes: PERRL, EOMI  Throat: clear, no thrush, MMM  Pulm: CTAB, no rales/wheezes  CV: RRR, normal s1/s2  Abd: soft, NTND  Ext: no pedal edema bilaterally, distal extremities warm and well perfused; PT pulses 2+  Psych: normal affect, no agitation  Skin: no observable rashes  Neuro:  A+Ox3, fluent speech, follows commands     Motor Exam:   Right Left Site Right Left Site   5 5 S Ab:  Shoulder Abductors 5 5 HF:  Hip Flexors   5 5 EF:  Elbow Flexors 5 5 KE:  Knee Extensors   5 5 EE:  Elbow Extensors 5 5 DR: Dorsi Flexors   5 5 WE:  Wrist Extensors 5 5 EHL: Ext Uribe Longus   5 5 FF:  Finger Flexors 5 5 PF:  Plantar Flexors   5 5 HI:  Hand Intrinsics        Negative babinski bilateral  2+ patellar reflex bilateral  Normal gait pattern, some difficulty performing tandem gait  Negative straight leg raise bilateral    Labs:   Lab Results   Component Value Date    WBC 9 0 05/14/2019    HGB 15 1 05/14/2019    HCT 45 8 05/14/2019    MCV 97 0 05/14/2019     05/14/2019     Lab Results   Component Value Date    GLUCOSE 117 04/16/2019    CALCIUM 9 2 04/04/2019    K 4 0 04/04/2019    CO2 26 04/16/2019     04/04/2019    BUN 15 04/04/2019    CREATININE 0 76 04/04/2019         Past Medical History:   Diagnosis Date    Diabetes mellitus ([facility] [address] )     Dyslipidemia 3/26/2019    Hypertension     Stroke [facility])        Patient Active Problem List    Diagnosis Date Noted    Chronic ischemic right MCA stroke 08/06/2019    Status post placement of implantable loop recorder 04/06/2019    History of diabetes mellitus, type II 04/03/2019    Adjustment reaction with anxiety 03/29/2019    Insomnia 03/29/2019    Fall 03/28/2019    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 ([facility] [address] ) 03/28/2019    Constipation 03/27/2019    Urinary retention 03/27/2019    Hypertriglyceridemia 03/26/2019    Nicotine dependence 03/26/2019    Stenosis of right carotid artery 03/26/2019    Presbyopia 03/26/2019    History of stroke 03/19/2019    Headache 03/19/2019    Hypertension 03/19/2019    GERD (gastroesophageal reflux disease) 03/19/2019       Past Surgical History:   Procedure Laterality Date    BACK SURGERY      IR STROKE ALERT  3/19/2019    SHOULDER SURGERY         Family History   Problem Relation Age of Onset    Heart attack Mother     Diabetes Mother     Prostate cancer Father        Social History   Prior smoker    No Known Allergies      Current Outpatient Medications:     clopidogrel (PLAVIX) 75 mg tablet, Take 1 tablet (75 mg total) by mouth daily, Disp: 60 tablet, Rfl: 0    fluticasone (FLONASE) 50 mcg/act nasal spray, 1 spray into each nostril daily, Disp: 16 g, Rfl: 0    ibuprofen (MOTRIN) 600 mg tablet, Take 1 tablet (600 mg total) by mouth every 6 (six) hours as needed for mild pain for up to 7 days, Disp: 28 tablet, Rfl: 0    Melatonin 10 MG TABS, Take by mouth Takes 2 tabs as needed, Disp: , Rfl:     metFORMIN (GLUCOPHAGE) 500 mg tablet, Take 1 tablet (500 mg total) by mouth daily with breakfast, Disp: 30 tablet, Rfl: 0    omeprazole (PriLOSEC OTC) 20 MG tablet, Take 20 mg by mouth daily, Disp: , Rfl:     sildenafil (REVATIO) 20 mg tablet, TAKE 2 3 TABLETS BY MOUTH AS NEEDED FOR SEXUAL ACTIVITY, Disp: , Rfl: 2    simvastatin (ZOCOR) 40 mg tablet, , Disp: , Rfl:     TOPROL  MG 24 hr tablet, , Disp: , Rfl:

## 2019-11-26 ENCOUNTER — HOSPITAL ENCOUNTER (OUTPATIENT)
Dept: NON INVASIVE DIAGNOSTICS | Facility: HOSPITAL | Age: 59
Discharge: HOME | End: 2019-11-26
Attending: PHYSICAL MEDICINE & REHABILITATION
Payer: COMMERCIAL

## 2019-11-26 DIAGNOSIS — I73.9 CLAUDICATION OF BOTH LOWER EXTREMITIES (HCC): ICD-10-CM

## 2019-11-26 PROCEDURE — 93925 LOWER EXTREMITY STUDY: CPT | Performed by: INTERNAL MEDICINE

## 2019-11-26 PROCEDURE — 93925 LOWER EXTREMITY STUDY: CPT

## 2019-11-26 PROCEDURE — 93923 UPR/LXTR ART STDY 3+ LVLS: CPT | Performed by: INTERNAL MEDICINE

## 2019-11-26 PROCEDURE — 93923 UPR/LXTR ART STDY 3+ LVLS: CPT

## 2019-12-12 ENCOUNTER — TELEPHONE (OUTPATIENT)
Dept: NEUROLOGY | Facility: CLINIC | Age: 59
End: 2019-12-12

## 2019-12-12 NOTE — TELEPHONE ENCOUNTER
Patient calling for results of VAS lower limb arterial duplex  Please advise        CB# [phone]

## 2019-12-18 ENCOUNTER — TELEPHONE (OUTPATIENT)
Dept: NEUROLOGY | Facility: CLINIC | Age: 59
End: 2019-12-18

## 2019-12-18 NOTE — TELEPHONE ENCOUNTER
Received request for records from [employer]   Scanned copy of request into chart and faxed to [facility]

## 2020-01-10 ENCOUNTER — REMOTE DEVICE CLINIC VISIT (OUTPATIENT)
Dept: CARDIOLOGY CLINIC | Facility: CLINIC | Age: 60
End: 2020-01-10
Payer: COMMERCIAL

## 2020-01-10 DIAGNOSIS — Z95.818 PRESENCE OF CARDIAC DEVICE: Primary | ICD-10-CM

## 2020-01-10 PROCEDURE — 93298 REM INTERROG DEV EVAL SCRMS: CPT | Performed by: INTERNAL MEDICINE

## 2020-01-10 PROCEDURE — G2066 INTER DEVC REMOTE 30D: HCPCS | Performed by: INTERNAL MEDICINE

## 2020-01-10 NOTE — PROGRESS NOTES
Results for orders placed or performed in visit on 01/10/20   Cardiac EP device report    Narrative    MDT-LOOP RECORDER  CARELINK TRANSMISSION: BATTERY STATUS "OK"  NO EPISODES  NORMAL DEVICE FUNCTION   PL

## 2020-04-27 ENCOUNTER — TELEPHONE (OUTPATIENT)
Dept: CARDIOLOGY CLINIC | Facility: CLINIC | Age: 60
End: 2020-04-27

## 2020-04-30 ENCOUNTER — REMOTE DEVICE CLINIC VISIT (OUTPATIENT)
Dept: CARDIOLOGY CLINIC | Facility: CLINIC | Age: 60
End: 2020-04-30
Payer: COMMERCIAL

## 2020-04-30 DIAGNOSIS — Z95.818 PRESENCE OF OTHER CARDIAC IMPLANTS AND GRAFTS: Primary | ICD-10-CM

## 2020-04-30 PROCEDURE — 93298 REM INTERROG DEV EVAL SCRMS: CPT | Performed by: INTERNAL MEDICINE

## 2020-04-30 PROCEDURE — G2066 INTER DEVC REMOTE 30D: HCPCS | Performed by: INTERNAL MEDICINE

## 2020-07-07 ENCOUNTER — APPOINTMENT (OUTPATIENT)
Dept: RADIOLOGY | Facility: CLINIC | Age: 60
End: 2020-07-07
Payer: COMMERCIAL

## 2020-07-07 ENCOUNTER — TRANSCRIBE ORDERS (OUTPATIENT)
Dept: ADMINISTRATIVE | Facility: HOSPITAL | Age: 60
End: 2020-07-07

## 2020-07-07 DIAGNOSIS — M25.512 LEFT SHOULDER PAIN, UNSPECIFIED CHRONICITY: ICD-10-CM

## 2020-07-07 DIAGNOSIS — M25.512 LEFT SHOULDER PAIN, UNSPECIFIED CHRONICITY: Primary | ICD-10-CM

## 2020-07-07 PROCEDURE — 73030 X-RAY EXAM OF SHOULDER: CPT

## 2020-07-22 ENCOUNTER — REMOTE DEVICE CLINIC VISIT (OUTPATIENT)
Dept: CARDIOLOGY CLINIC | Facility: CLINIC | Age: 60
End: 2020-07-22
Payer: COMMERCIAL

## 2020-07-22 DIAGNOSIS — Z95.818 PRESENCE OF OTHER CARDIAC IMPLANTS AND GRAFTS: Primary | ICD-10-CM

## 2020-07-22 PROCEDURE — G2066 INTER DEVC REMOTE 30D: HCPCS | Performed by: INTERNAL MEDICINE

## 2020-07-22 PROCEDURE — 93298 REM INTERROG DEV EVAL SCRMS: CPT | Performed by: INTERNAL MEDICINE

## 2020-07-22 NOTE — PROGRESS NOTES
MDT-LOOP RECORDER  CARELINK TRANSMISSION: LOOP RECORDER  PRESENTING RHYTHM NSR @ 78 BPM  BATTERY STATUS "OK"  NO PATIENT OR DEVICE ACTIVATED EPISODES  NORMAL DEVICE FUNCTION   DL

## 2020-08-10 ENCOUNTER — OFFICE VISIT (OUTPATIENT)
Dept: NEUROLOGY | Facility: CLINIC | Age: 60
End: 2020-08-10
Payer: COMMERCIAL

## 2020-08-10 VITALS
WEIGHT: 229 LBS | HEART RATE: 69 BPM | BODY MASS INDEX: 35.94 KG/M2 | DIASTOLIC BLOOD PRESSURE: 79 MMHG | TEMPERATURE: 98 F | SYSTOLIC BLOOD PRESSURE: 170 MMHG | HEIGHT: 67 IN

## 2020-08-10 DIAGNOSIS — I65.21 STENOSIS OF RIGHT CAROTID ARTERY: ICD-10-CM

## 2020-08-10 DIAGNOSIS — R06.83 SNORING: ICD-10-CM

## 2020-08-10 DIAGNOSIS — R41.3 MEMORY DIFFICULTIES: ICD-10-CM

## 2020-08-10 DIAGNOSIS — Z86.73 HISTORY OF STROKE: Primary | ICD-10-CM

## 2020-08-10 DIAGNOSIS — I69.30 CHRONIC ISCHEMIC RIGHT MCA STROKE: ICD-10-CM

## 2020-08-10 PROCEDURE — 99215 OFFICE O/P EST HI 40 MIN: CPT | Performed by: PSYCHIATRY & NEUROLOGY

## 2020-08-10 RX ORDER — CITALOPRAM HYDROBROMIDE 20 MG/10ML
10 SOLUTION, ORAL ORAL DAILY
COMMUNITY

## 2020-08-10 RX ORDER — ATORVASTATIN CALCIUM 40 MG/1
40 TABLET, FILM COATED ORAL DAILY
COMMUNITY

## 2020-08-10 NOTE — PROGRESS NOTES
Patient ID: Harrison Barone is a 63 y o  male  Assessment/Plan:    History of stroke  64 y/o m with h/o HTN, HLD, GERD, Type-2DM, prior smoking- stopped on Mar 2019, he also has loop recorder since 2019  Presents as a right MCA stroke s/p thrombectomy as follow-up after 1 year  Patient had stroke on 03/18/2019  Symptoms included-confusion, slurred speech, unable to drive, episodes of bilateral arm and leg weakness  CT showed right MCA occlusion and right carotid stenosis  S/p Thrombectomy  MRI showed multiple right MCA territory infarct  Plan-   VAS carotid complete study- to reassess the stenosis  Continue Plavix 75 mg p o  Q d  Continue atorvastatin 40 mg p o  Q d  Patient is advised to send us the lipid panel that he had done on a different facility  Regular exercise 30 minutes at least 5 times a week  Blood pressure goal less than 130/90  Defer HTN management to PCP  Follow-up in 6 months      Memory difficulties  Started years ago but more pronounced after Stroke, Some difficulty with short term memory  He forgets what channel on TV he was watching at times  He has to repeat a lot, word finding difficulty  He denies issues with cooking except one time he put plastic in oven, no issues with grooming, directions  Scored 20/30 on MOCA  Unable to tell me correct date and month  Able to tell me year, day, place, city in orientation question  Has difficulty in recall, unable to make the cube  Etiology- mild cognitive impairment could be due to sleep deprivation vs Vascular dementia vs alzeihmers dementia  Plan-   Neuro quant MRI  Vitamin B12  TSH  CBC  CMP  Sleep study    Things that we know are helpful for thinking and memory   1) Exercise program: gradually increase your physical activity over time  Start small and be patient  Aerobic (cardio) activity is best but incorporate balance, strength and flexibility training as well    a   Try to get at least 30min 3 times per week   2) Diet: Mediterranean diet (colorful fruits and vegetables, olive oil, fish, whole grains, very little red meet is any), MIND diet, anti-inflammatory diet  a  Stay well hydrated: drink 6-8 glasses of water per day   b  What's good for your heart is good for your brain  c  Avoid high-salt foods   3) Sleep: aim for at least 7-8 hours per night   a  Avoid alcohol and medications like Benadryl (Tylenol PM) or other sedating drugs  4) Stress management/mindfulness practice:   a  Talk with our social workers about finding a cognitive behavioral therapists  b  Try a smart phone estrellita like WHMSOFT or ShareNotes.com for beginners   i  Try curable for pain    c  Take a course in Mindfulness Based Stress Reduction (MBSR)   i  Luis quiñonez  Read or listen to an audiobook about it:  i  Mindfulness for beginners   ii  10% happier  iii  The happiness advantage   5) Social engagement:   a  Stay in touch with family and friends   b  Plan a few specific activities for your social health every week   c  Melanie Walsh a local support group   d  Volunteer! www [facility] org/[email or web address] or call [phone]    MIND diet score:  1 point for each component  - Green leafy vegetables: at least 6 per week  - Other vegetable: at least 1 per day   - Berries: at least 2 per week  - Red meat: fewer than 4 per week   - Fish: at least 1 per week   - Poultry: at least 2 per week  - Beans: at least 3 per week  - Nuts: at least 5 per week  - Fast or fried food: less than 1 per week  - Olive oil   - Butter less: less than 1 table-spoon per day   - Cheese: less than 1 serving per week   - Pastries/sweets: less than 5 servings per week  - Alcohol: no more than 1 serving/ day          Snoring  Patient reports of snoring every day for years and at times gasp for air at night  Plan-  Polysomnography  Median sleep wake cycle       Diagnoses and all orders for this visit:    History of stroke  -     VAS carotid complete study;  Future    Snoring  - MRI brain NeuroQuant wo contrast; Future  -     Vitamin B12; Future  -     TSH, 3rd generation; Future  -     CBC and differential; Future  -     Comprehensive metabolic panel; Future  -     Diagnostic Sleep Study; Future    Chronic ischemic right MCA stroke    Stenosis of right carotid artery    Memory difficulties    Other orders  -     atorvastatin (LIPITOR) 40 mg tablet; every 24 hours  -     citalopram (CeleXA) 10 mg/5 mL suspension; every 24 hours         Subjective:    HPI  64 y/o m with h/o HTN, HLD, GERD, Type-2DM, prior smoking- stopped on Mar 2019, he also has loop recorder since 2019  Presents as a right MCA stroke s/p Thrombectomy follow-up after 1 year  To review, pt presented in ED as a stroke alert on 03/18/2019 secondary to confusion, unable to drive, slurred speech, episodes of bilateral arm and leg weakness  Initial NIH was 0  TPA not administered-patient was out of window  CTA head and neck demonstrated right MCA occlusion and right carotid stenosis, patient had thrombectomy of the right M1  MRI showed multiple right MCA territory infarct  During my evaluation, pt reports he is doing okay  Pt reports weakness in left hand, leg weakness was resolved within a month  He does feels clumpy at times  He fell about a month and half ago when he was going off the ladder and fell on left shoulder  MRI done which was negative for rotator cuff tear  He started doing PT and seems like improving  Pt reports at times balance is off  Up steps balance is okay while going down he becomes off  No off balance when taking shower  Its improving, balance is more in morning  Left hand is back to normal  Fine motor is okay  Sleep is slightly disturbed, He reports snoring at night, he gasps for air at times  No vision issues  Patient never had a sleep study done  Patient also reports memory difficulties  Started years ago but more pronounced after Stroke, Some difficulty with short term memory   He forgets what channel on TV he was watching at times  He has to repeat a lot, word finding difficulty  He denies issues with cooking except one time he put plastic in oven, no issues with grooming, directions  Patient denies any TIA/stroke like symptoms, denies any slurred speech, vision issues, any new weakness except the right shoulder pain  No new numbness  The following portions of the patient's history were reviewed and updated as appropriate: allergies, current medications, past family history, past medical history, past social history, past surgical history and problem list       Objective:    Blood pressure 170/79, pulse 69, temperature 98 °F (36 7 °C), temperature source Oral, height 5' 6 5" (1 689 m), weight 104 kg (229 lb)  Physical Exam  Vitals signs reviewed  Constitutional:       Appearance: Normal appearance  HENT:      Head: Normocephalic and atraumatic  Nose: Nose normal    Eyes:      Extraocular Movements: Extraocular movements intact  Pupils: Pupils are equal, round, and reactive to light  Neck:      Musculoskeletal: Normal range of motion  Cardiovascular:      Rate and Rhythm: Normal rate and regular rhythm  Pulmonary:      Effort: Pulmonary effort is normal    Abdominal:      General: Abdomen is flat  Bowel sounds are normal       Palpations: Abdomen is soft  Musculoskeletal: Normal range of motion  Skin:     General: Skin is warm  Capillary Refill: Capillary refill takes less than 2 seconds  Neurological:      Mental Status: He is alert  Comments: Neurological Examination:     Mental Status: The patient was awake, alert, attentive, able to tell me year, day, please, CT okay while unable to tell me correct date and month  Also had difficulty in recall  Scored 20/30 in [address] [place] [address]  Cranial Nerves:   I: smell Not tested   II: visual fields Full to confrontation  Pupils equal, round, reactive to light with normal accomodation  Fundus: benign fundus  III,IV,VI: extraocular muscles EOMI, no nystagmus   V: masseter and pterygoid strength full  Sensation in the V1 through V3 distributions intact to pinprick and light touch bilaterally  VII: Face is symmetric with no weakness noted  VIII: Audition intact to finger rub bilaterally  IX/X: Uvula midline  Soft palate elevation symmetric  XI: Trapezius and SCM strength 5/5 B/L  XII: Tongue midline with no atrophy or fasciculations with appropriate movement  Motor Examination:   No pronator drift  Bulk: Normal  No atrophy Tone: Normal  Fasciculations: None  Deltoid Biceps Triceps WE   WF   FF IO     Right        5         5          5         5      5      5   5        Left           3+      4+        4+       4+      4+   5   5                       IP        Quad   Ham     TA       Gastroc   Right      5            5          5         5                5  Left         4+          5         5         5                5       Reflexes:                   Biceps Brachioradialis Triceps Patella Achilles Plantars   Right          2+            2+                  2+        2+       1+         Down   Left            2+             2+                 2+         1+       1+         Down     Clonus: None    Pathological Reflexes:  Hoffmans: negative    Coordination: Patient able to perform normal finger-to-nose and heel to shin appropriately  Sensory: Normal sensation to light touch, pin prick and vibratory sensation throughout  Gait: Romberg negative  Unable to do tandem walk  Casual walk is okay   Psychiatric:         Mood and Affect: Mood normal          Neurological Exam  Mental Status  Alert  Cranial Nerves  CN III, IV, VI: Extraocular movements intact bilaterally  Pupils equal round and reactive to light bilaterally  ROS:    Review of Systems   Constitutional: Negative  Negative for appetite change and fever  HENT: Negative    Negative for hearing loss, tinnitus, trouble swallowing and voice change  Eyes: Negative  Negative for photophobia and pain  Respiratory: Negative  Negative for shortness of breath  Cardiovascular: Negative  Negative for palpitations  Gastrointestinal: Negative  Negative for nausea and vomiting  Endocrine: Negative  Negative for cold intolerance  Genitourinary: Negative  Negative for dysuria, frequency and urgency  Musculoskeletal: Negative  Negative for myalgias and neck pain  Skin: Negative  Negative for rash  Neurological: Positive for weakness  Negative for dizziness, tremors, seizures, syncope, facial asymmetry, speech difficulty, light-headedness, numbness and headaches  Patient stated that he has bilateral legs weakness  Hematological: Negative  Does not bruise/bleed easily  Psychiatric/Behavioral: Negative  Negative for confusion, hallucinations and sleep disturbance          Patient stated that he has memory problems

## 2020-08-10 NOTE — ASSESSMENT & PLAN NOTE
Patient reports of snoring every day for years and at times gasp for air at night       Plan-  Polysomnography  Median sleep wake cycle

## 2020-08-10 NOTE — ASSESSMENT & PLAN NOTE
64 y/o m with h/o HTN, HLD, GERD, Type-2DM, prior smoking- stopped on Mar 2019, he also has loop recorder since 2019  Presents as a right MCA stroke s/p thrombectomy as follow-up after 1 year  Patient had stroke on 03/18/2019  Symptoms included-confusion, slurred speech, unable to drive, episodes of bilateral arm and leg weakness  CT showed right MCA occlusion and right carotid stenosis  S/p Thrombectomy  MRI showed multiple right MCA territory infarct  Plan-   VAS carotid complete study- to reassess the stenosis  Continue Plavix 75 mg p o  Q d  Continue atorvastatin 40 mg p o  Q d    Patient is advised to send us the lipid panel that he had done on a different facility  Regular exercise 30 minutes at least 5 times a week  Blood pressure goal less than 130/90  Defer HTN management to PCP  Follow-up in 6 months

## 2020-08-10 NOTE — ASSESSMENT & PLAN NOTE
Started years ago but more pronounced after Stroke, Some difficulty with short term memory  He forgets what channel on TV he was watching at times  He has to repeat a lot, word finding difficulty  He denies issues with cooking except one time he put plastic in oven, no issues with grooming, directions  Scored 20/30 on MOCA  Unable to tell me correct date and month  Able to tell me year, day, place, city in orientation question  Has difficulty in recall, unable to make the cube  Etiology- mild cognitive impairment could be due to sleep deprivation vs Vascular dementia vs alzeihmers dementia  Plan-   Neuro quant MRI  Vitamin B12  TSH  CBC  CMP  Sleep study    Things that we know are helpful for thinking and memory   1) Exercise program: gradually increase your physical activity over time  Start small and be patient  Aerobic (cardio) activity is best but incorporate balance, strength and flexibility training as well    a  Try to get at least 30min 3 times per week   2) Diet: Mediterranean diet (colorful fruits and vegetables, olive oil, fish, whole grains, very little red meet is any), MIND diet, anti-inflammatory diet  a  Stay well hydrated: drink 6-8 glasses of water per day   b  What's good for your heart is good for your brain  c  Avoid high-salt foods   3) Sleep: aim for at least 7-8 hours per night   a  Avoid alcohol and medications like Benadryl (Tylenol PM) or other sedating drugs  4) Stress management/mindfulness practice:   a  Talk with our social workers about finding a cognitive behavioral therapists  b  Try a smart phone estrellita like [employer]ream[employer] or mindfulness for beginners   i  Try curable for pain    c  Take a course in Mindfulness Based Stress Reduction (MBSR)   i  Luis quiñonez  Read or listen to an audiobook about it:  i  Mindfulness for beginners   ii  10% happier  iii   The happiness advantage   5) Social engagement:   a  Stay in touch with family and friends b  Plan a few specific activities for your social health every week   clementine [place] local support group   d  Volunteer! www [email or web address] or call [phone]    MIND diet score:  1 point for each component      - Green leafy vegetables: at least 6 per week  - Other vegetable: at least 1 per day   - Berries: at least 2 per week  - Red meat: fewer than 4 per week   - Fish: at least 1 per week   - Poultry: at least 2 per week  - Beans: at least 3 per week  - Nuts: at least 5 per week  - Fast or fried food: less than 1 per week  - Olive oil   - Butter less: less than 1 table-spoon per day   - Cheese: less than 1 serving per week   - Pastries/sweets: less than 5 servings per week  - Alcohol: no more than 1 serving/ day

## 2020-08-11 ENCOUNTER — TELEPHONE (OUTPATIENT)
Dept: NEUROLOGY | Facility: CLINIC | Age: 60
End: 2020-08-11

## 2020-08-11 NOTE — TELEPHONE ENCOUNTER
----- Message from Naveed Norris MD sent at 8/11/2020 12:07 PM EDT -----  Regarding: FW: Test Results Question  Contact: [phone]  Ok his cholesterol seems to be lowering, and recommending continuing at current dosing  Patient does have elevated Triglycerides, so would recommend strict less fat diet at this time and regular exercise as well   Please call and let patient know of this, thanks      ----- Message -----  From: Korina Cardozo RN  Sent: 8/10/2020   2:16 PM EDT  To: Naveed Norris MD  Subject: FW: Test Results Question                          ----- Message -----  From: Quynh Chaney  Sent: 8/10/2020   2:00 PM EDT  To: Neu[facility] [place] [facility]  Subject: Test Results Question                            Here is my blood work from 6/24/2020

## 2020-08-17 ENCOUNTER — TELEPHONE (OUTPATIENT)
Dept: SLEEP CENTER | Facility: CLINIC | Age: 60
End: 2020-08-17

## 2020-08-17 NOTE — TELEPHONE ENCOUNTER
----- Message from Abraham Caicedo MD sent at 8/14/2020  3:18 PM EDT -----  Approved  ----- Message -----  From: Fausto Ovalles  Sent: 8/11/2020   9:55 AM EDT  To: Sleep Medicine [facility] [place] [facility], #    PLEASE REVIEW FOR APPROVAL OR DENIAL AND WHY

## 2020-08-20 ENCOUNTER — HOSPITAL ENCOUNTER (OUTPATIENT)
Dept: MRI IMAGING | Facility: HOSPITAL | Age: 60
Discharge: HOME | End: 2020-08-20
Payer: COMMERCIAL

## 2020-08-20 DIAGNOSIS — R06.83 SNORING: ICD-10-CM

## 2020-08-20 PROCEDURE — G1004 CDSM NDSC: HCPCS

## 2020-08-20 PROCEDURE — 70551 MRI BRAIN STEM W/O DYE: CPT

## 2020-08-24 ENCOUNTER — TELEPHONE (OUTPATIENT)
Dept: NEUROLOGY | Facility: CLINIC | Age: 60
End: 2020-08-24

## 2020-08-24 NOTE — TELEPHONE ENCOUNTER
Pt called in asking for his MRI brain results  Results not yet reviewed  Please let us know the message you would like us to share with the patient

## 2020-09-01 ENCOUNTER — HOSPITAL ENCOUNTER (OUTPATIENT)
Dept: NON INVASIVE DIAGNOSTICS | Facility: HOSPITAL | Age: 60
Discharge: HOME | End: 2020-09-01
Payer: COMMERCIAL

## 2020-09-01 DIAGNOSIS — Z86.73 HISTORY OF STROKE: ICD-10-CM

## 2020-09-01 PROCEDURE — 93880 EXTRACRANIAL BILAT STUDY: CPT

## 2020-09-01 PROCEDURE — 93880 EXTRACRANIAL BILAT STUDY: CPT | Performed by: SURGERY

## 2020-09-01 NOTE — TELEPHONE ENCOUNTER
chris - please call and let the patient know of the MRI brain findings - He does have extensive changes consistent with small vessel disease, and involving the smaller vessels, no evidence of any degeneration in the hippocampal region  Would recommend continuing his medications  Will see how his right carotid artery is doing after his U/S carotids

## 2020-09-16 ENCOUNTER — HOSPITAL ENCOUNTER (OUTPATIENT)
Dept: SLEEP CENTER | Facility: HOSPITAL | Age: 60
Discharge: HOME | End: 2020-09-16
Payer: COMMERCIAL

## 2020-09-16 DIAGNOSIS — R06.83 SNORING: ICD-10-CM

## 2020-09-16 PROCEDURE — 95810 POLYSOM 6/> YRS 4/> PARAM: CPT

## 2020-09-17 NOTE — PROGRESS NOTES
Sleep Study Documentation    Pre-Sleep Study       Sleep testing procedure explained to patient:YES    Patient napped prior to study:NO    Caffeine:Dayshift worker after 12PM   Caffeine use:YES- coffee  6 to 18 ounces    Alcohol:Dayshift workers after 5PM: Alcohol use:NO    Typical day for patient:YES       Study Documentation    Sleep Study Indications: Snoring, EDS, nocturnal choking, and impaired concentration/memory    Sleep Study: Diagnostic   Snore:Severe  Supplemental O2: no    O2 flow rate (L/min) range n/a  O2 flow rate (L/min) final n/a  Minimum SaO2 81%  Baseline SaO2 96%        EKG abnormalities:  Yes, epoch 625, 651, 964    EEG abnormalities: no    Sleep Study Recorded < 2 hours: N/A    Sleep Study Recorded > 2 hours but incomplete study: N/A    Sleep Study Recorded 6 hours but no sleep obtained: NO    Patient classification: retired       Post-Sleep Study    Medication used at bedtime or during sleep study:NO    Patient reports time it took to fall asleep:20 to 30 minutes    Patient reports waking up during study:1 to 2 times  Patient reports returning to sleep without difficulty  Patient reports sleeping 6 to 8 hours without dreaming  Patient reports sleep during study:typical    Patient rated sleepiness: Not sleepy or tired    PAP treatment:no

## 2020-09-23 ENCOUNTER — TELEPHONE (OUTPATIENT)
Dept: SLEEP CENTER | Facility: CLINIC | Age: 60
End: 2020-09-23

## 2020-11-03 ENCOUNTER — OFFICE VISIT (OUTPATIENT)
Dept: SLEEP CENTER | Facility: HOSPITAL | Age: 60
End: 2020-11-03
Payer: COMMERCIAL

## 2020-11-03 ENCOUNTER — TELEPHONE (OUTPATIENT)
Dept: SLEEP CENTER | Facility: HOSPITAL | Age: 60
End: 2020-11-03

## 2020-11-03 VITALS
WEIGHT: 229 LBS | BODY MASS INDEX: 36.8 KG/M2 | DIASTOLIC BLOOD PRESSURE: 90 MMHG | HEIGHT: 66 IN | SYSTOLIC BLOOD PRESSURE: 159 MMHG

## 2020-11-03 DIAGNOSIS — G81.90 HEMIPLEGIA OF NONDOMINANT SIDE DUE TO ACUTE STROKE (HCC): ICD-10-CM

## 2020-11-03 DIAGNOSIS — G47.19 EXCESSIVE DAYTIME SLEEPINESS: ICD-10-CM

## 2020-11-03 DIAGNOSIS — I63.9 HEMIPLEGIA OF NONDOMINANT SIDE DUE TO ACUTE STROKE (HCC): ICD-10-CM

## 2020-11-03 DIAGNOSIS — G47.33 OSA (OBSTRUCTIVE SLEEP APNEA): Primary | ICD-10-CM

## 2020-11-03 DIAGNOSIS — Z01.812 ENCOUNTER FOR PREPROCEDURE SCREENING LABORATORY TESTING FOR COVID-19: Primary | ICD-10-CM

## 2020-11-03 DIAGNOSIS — R41.3 MEMORY DIFFICULTIES: ICD-10-CM

## 2020-11-03 DIAGNOSIS — F45.8 BRUXISM: ICD-10-CM

## 2020-11-03 DIAGNOSIS — G47.62 NOCTURNAL LEG CRAMPS: ICD-10-CM

## 2020-11-03 DIAGNOSIS — Z87.891 FORMER SMOKER: ICD-10-CM

## 2020-11-03 DIAGNOSIS — Z20.822 ENCOUNTER FOR PREPROCEDURE SCREENING LABORATORY TESTING FOR COVID-19: Primary | ICD-10-CM

## 2020-11-03 DIAGNOSIS — Z86.73 HISTORY OF STROKE: ICD-10-CM

## 2020-11-03 PROCEDURE — 99244 OFF/OP CNSLTJ NEW/EST MOD 40: CPT | Performed by: INTERNAL MEDICINE

## 2020-11-04 ENCOUNTER — REMOTE DEVICE CLINIC VISIT (OUTPATIENT)
Dept: CARDIOLOGY CLINIC | Facility: CLINIC | Age: 60
End: 2020-11-04
Payer: COMMERCIAL

## 2020-11-04 DIAGNOSIS — Z95.818 PRESENCE OF OTHER CARDIAC IMPLANTS AND GRAFTS: Primary | ICD-10-CM

## 2020-11-04 PROCEDURE — 93298 REM INTERROG DEV EVAL SCRMS: CPT | Performed by: INTERNAL MEDICINE

## 2020-11-04 PROCEDURE — G2066 INTER DEVC REMOTE 30D: HCPCS | Performed by: INTERNAL MEDICINE

## 2021-03-02 ENCOUNTER — OFFICE VISIT (OUTPATIENT)
Dept: NEUROLOGY | Facility: CLINIC | Age: 61
End: 2021-03-02
Payer: COMMERCIAL

## 2021-03-02 VITALS
SYSTOLIC BLOOD PRESSURE: 176 MMHG | HEIGHT: 66 IN | WEIGHT: 231.8 LBS | DIASTOLIC BLOOD PRESSURE: 81 MMHG | HEART RATE: 69 BPM | BODY MASS INDEX: 37.25 KG/M2 | TEMPERATURE: 97.9 F

## 2021-03-02 DIAGNOSIS — R45.86 MOOD CHANGES: ICD-10-CM

## 2021-03-02 DIAGNOSIS — F01.50 VASCULAR DEMENTIA (HCC): ICD-10-CM

## 2021-03-02 DIAGNOSIS — I69.30 CHRONIC ISCHEMIC RIGHT MCA STROKE: ICD-10-CM

## 2021-03-02 DIAGNOSIS — Z86.73 HISTORY OF STROKE: ICD-10-CM

## 2021-03-02 DIAGNOSIS — F03.90 DEMENTIA (HCC): Primary | ICD-10-CM

## 2021-03-02 PROCEDURE — 99215 OFFICE O/P EST HI 40 MIN: CPT | Performed by: PSYCHIATRY & NEUROLOGY

## 2021-03-02 PROCEDURE — 1036F TOBACCO NON-USER: CPT | Performed by: PSYCHIATRY & NEUROLOGY

## 2021-03-02 PROCEDURE — 3008F BODY MASS INDEX DOCD: CPT | Performed by: PSYCHIATRY & NEUROLOGY

## 2021-03-02 RX ORDER — DONEPEZIL HYDROCHLORIDE 10 MG/1
5 TABLET, FILM COATED ORAL 2 TIMES DAILY
Qty: 60 TABLET | Refills: 1 | Status: SHIPPED | OUTPATIENT
Start: 2021-03-02

## 2021-03-02 NOTE — PROGRESS NOTES
Patient ID: Octavia Guerra is a 57 y o  male  Assessment/Plan: This is a 56 y/o Male who is here as a follow up for history of stroke  Patient has memory issues with [address] [facility] [address] 18/30  I personally reviewed MRI brain neuroquant which was negative for any hippocampal degeneration  PLAN:      Diagnoses and all orders for this visit:    History of stroke  -c/w with combination of Plavix and atorvastatin for secondary stroke prevention  -BP goal < 130/80, BP is   Elevated today due to white coat hypertension   -continue to monitor DM and euglycemic control, last A1c is 6 3 from 9 20  -LDL goal < 100  -Defer to PCP regarding DM and BP management    Counseling -   -does not smoke at this time   -denies any history of snoring    -I advised patient to avoid using NSAIDs for headaches or other pain and to stick to tylenol if needed  -Recommend mediterranean diet & regular exercise regimen atleast 4-5 times a week for 20-30 minutes  -I educated patient/family regarding medication compliance    Chronic ischemic right MCA stroke    Dementia ([facility] [address] )  -2/2 vascular dementia  -start patient on donepezil 5mg PP BID   -     donepezil (ARICEPT) 10 mg tablet; Take 0 5 tablets (5 mg total) by mouth 2 (two) times a day  -     Ambulatory referral to Occupational Therapy; Future  -     Ambulatory referral to Speech Therapy; Future    Vascular dementia [facility])  -speech therapy referral  -     Ambulatory referral to Speech Therapy; Future    Mood changes  -will psychotherapy referral    -     Ambulatory referral to Psychology; Future        Follow up - 3 months  I would be happy to see the patient sooner if any new questions/concerns arise  Patient/Guardian was advised to the call the office if they have any questions and concerns in the meantime       Patient/Guardian does understand that if they have any new stroke like symptoms such as facial droop on one side, weakness/paralysis on either side, speech trouble, numbness on one side, balance issues, any vision changes, extreme dizziness or any new headache, to call 9-1-1 immediately or to proceed to the nearest ER immediately  Subjective:    HPI    This is a 58 y/o Male who is here as a follow up for history of CVA  Patient states that he still has memory issues and is getting worse and has a hard time "remembering anything"   He did try speech therapy in the past he did notice an improvement as far as his memory is concerned  He does not have any TIA/CVA like symptoms  He Is compliant with medications  The following portions of the patient's history were reviewed and updated as appropriate:   He  has a past medical history of Diabetes mellitus ([facility] [address] ), Dyslipidemia (3/26/2019), Hypertension, and Stroke ([facility] [address] )  He   Patient Active Problem List    Diagnosis Date Noted    Vascular dementia ([facility] [address] ) 03/02/2021    Snoring 08/10/2020    Memory difficulties 08/10/2020    Chronic ischemic right MCA stroke 08/06/2019    Status post placement of implantable loop recorder 04/06/2019    History of diabetes mellitus, type II 04/03/2019    Adjustment reaction with anxiety 03/29/2019    Insomnia 03/29/2019    Fall 03/28/2019    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 ([facility] [address] ) 03/28/2019    Constipation 03/27/2019    Urinary retention 03/27/2019    Hypertriglyceridemia 03/26/2019    Nicotine dependence 03/26/2019    Stenosis of right carotid artery 03/26/2019    Presbyopia 03/26/2019    History of stroke 03/19/2019    Headache 03/19/2019    Hypertension 03/19/2019    GERD (gastroesophageal reflux disease) 03/19/2019     He  has a past surgical history that includes Back surgery; Shoulder surgery; and IR stroke alert (3/19/2019)  His family history includes Diabetes in his mother; Heart attack in his mother; Prostate cancer in his father  He  reports that he has quit smoking  He has never used smokeless tobacco  He reports previous alcohol use   He reports that he does not use drugs  Current Outpatient Medications   Medication Sig Dispense Refill    atorvastatin (LIPITOR) 40 mg tablet Take 40 mg by mouth daily       citalopram (CeleXA) 10 mg/5 mL suspension Take 10 mg by mouth daily       clopidogrel (PLAVIX) 75 mg tablet Take 1 tablet (75 mg total) by mouth daily 60 tablet 0    fluticasone (FLONASE) 50 mcg/act nasal spray 1 spray into each nostril daily 16 g 0    ibuprofen (MOTRIN) 600 mg tablet Take 1 tablet (600 mg total) by mouth every 6 (six) hours as needed for mild pain for up to 7 days 28 tablet 0    Melatonin 10 MG TABS Take 20 mg by mouth as needed Takes 2 tabs as needed       metFORMIN (GLUCOPHAGE) 500 mg tablet Take 1 tablet (500 mg total) by mouth daily with breakfast 30 tablet 0    omeprazole (PriLOSEC OTC) 20 MG tablet Take 20 mg by mouth daily      sildenafil (REVATIO) 20 mg tablet TAKE 2 3 TABLETS BY MOUTH AS NEEDED FOR SEXUAL ACTIVITY  2    TOPROL  MG 24 hr tablet Take 100 mg by mouth daily       donepezil (ARICEPT) 10 mg tablet Take 0 5 tablets (5 mg total) by mouth 2 (two) times a day 60 tablet 1     No current facility-administered medications for this visit        Current Outpatient Medications on File Prior to Visit   Medication Sig    atorvastatin (LIPITOR) 40 mg tablet Take 40 mg by mouth daily     citalopram (CeleXA) 10 mg/5 mL suspension Take 10 mg by mouth daily     clopidogrel (PLAVIX) 75 mg tablet Take 1 tablet (75 mg total) by mouth daily    fluticasone (FLONASE) 50 mcg/act nasal spray 1 spray into each nostril daily    ibuprofen (MOTRIN) 600 mg tablet Take 1 tablet (600 mg total) by mouth every 6 (six) hours as needed for mild pain for up to 7 days    Melatonin 10 MG TABS Take 20 mg by mouth as needed Takes 2 tabs as needed     metFORMIN (GLUCOPHAGE) 500 mg tablet Take 1 tablet (500 mg total) by mouth daily with breakfast    omeprazole (PriLOSEC OTC) 20 MG tablet Take 20 mg by mouth daily    sildenafil (REVATIO) 20 mg tablet TAKE 2 3 TABLETS BY MOUTH AS NEEDED FOR SEXUAL ACTIVITY    TOPROL  MG 24 hr tablet Take 100 mg by mouth daily     [DISCONTINUED] simvastatin (ZOCOR) 40 mg tablet      No current facility-administered medications on file prior to visit  He has No Known Allergies            Objective:    Blood pressure (!) 176/81, pulse 69, temperature 97 9 °F (36 6 °C), temperature source Temporal, height 5' 6" (1 676 m), weight 105 kg (231 lb 12 8 oz)  Physical Exam  General - Patient is alert, awake, oriented to time, place and person, follows commands  Speech - no dysarthria noted, no aphasia noted  Neck - supple  Skin - no rashes or lesions  Chest - clear to ausculation bilaterally  Heart - normal S1, S2, no murmurs, rubs or gallops     Neuro:   Cranial nerves: PERRL, EOMI, facial sensation intact, able to raise eyebrows symmetrically, cannot assess facial droop and tongue deviation due to face mask requirement  Motor: 5/5 throughout, normal tone, no pronator drift noted  Sensory - intact to soft touch throughout  Reflexes - 2+ throughout  Coordination - no ataxia/dysmetria noted  Gait - normal tandem walk without     ROS:    I personally reviewed ROS  Review of Systems   Constitutional: Positive for fatigue  HENT: Negative  Eyes: Negative  Respiratory: Negative  Cardiovascular: Negative  Gastrointestinal: Negative  Endocrine: Negative  Genitourinary: Negative  Musculoskeletal: Positive for gait problem  Skin: Negative  Allergic/Immunologic: Negative  Neurological: Positive for weakness (Bilateral Lower Extremities )  Memory Problems   Falls    Hematological: Negative  Psychiatric/Behavioral: Positive for decreased concentration

## 2021-03-03 DIAGNOSIS — R41.3 MEMORY LOSS: Primary | ICD-10-CM

## 2021-03-03 NOTE — TELEPHONE ENCOUNTER
Received fax from [facility] asking for 90 day supply of donepezil instead of 60  Please review and sign new script  Thanks!

## 2021-03-04 RX ORDER — DONEPEZIL HYDROCHLORIDE 5 MG/1
TABLET, FILM COATED ORAL
Qty: 90 TABLET | Refills: 1 | Status: SHIPPED | OUTPATIENT
Start: 2021-03-04 | End: 2021-03-04 | Stop reason: SDUPTHER

## 2021-03-04 RX ORDER — DONEPEZIL HYDROCHLORIDE 5 MG/1
TABLET, FILM COATED ORAL
Qty: 90 TABLET | Refills: 1 | Status: SHIPPED | OUTPATIENT
Start: 2021-03-04 | End: 2021-03-09 | Stop reason: SDUPTHER

## 2021-03-09 NOTE — TELEPHONE ENCOUNTER
Fax received from [address]  There is a discrepancy with the directions on 5mg tablet script  Please sign off on corrected script

## 2021-03-10 DIAGNOSIS — Z23 ENCOUNTER FOR IMMUNIZATION: ICD-10-CM

## 2021-03-10 RX ORDER — DONEPEZIL HYDROCHLORIDE 5 MG/1
TABLET, FILM COATED ORAL
Qty: 180 TABLET | Refills: 3 | Status: SHIPPED | OUTPATIENT
Start: 2021-03-10 | End: 2022-02-13

## 2021-03-26 ENCOUNTER — IMMUNIZATIONS (OUTPATIENT)
Dept: FAMILY MEDICINE CLINIC | Facility: HOSPITAL | Age: 61
End: 2021-03-26

## 2021-03-26 DIAGNOSIS — Z23 ENCOUNTER FOR IMMUNIZATION: Primary | ICD-10-CM

## 2021-03-26 PROCEDURE — 91300 SARS-COV-2 / COVID-19 MRNA VACCINE (PFIZER-BIONTECH) 30 MCG: CPT

## 2021-03-26 PROCEDURE — 0001A SARS-COV-2 / COVID-19 MRNA VACCINE (PFIZER-BIONTECH) 30 MCG: CPT

## 2021-04-19 ENCOUNTER — IMMUNIZATIONS (OUTPATIENT)
Dept: FAMILY MEDICINE CLINIC | Facility: HOSPITAL | Age: 61
End: 2021-04-19

## 2021-04-19 DIAGNOSIS — Z23 ENCOUNTER FOR IMMUNIZATION: Primary | ICD-10-CM

## 2021-04-19 PROCEDURE — 91300 SARS-COV-2 / COVID-19 MRNA VACCINE (PFIZER-BIONTECH) 30 MCG: CPT

## 2021-04-19 PROCEDURE — 0002A SARS-COV-2 / COVID-19 MRNA VACCINE (PFIZER-BIONTECH) 30 MCG: CPT

## 2021-05-20 ENCOUNTER — REMOTE DEVICE CLINIC VISIT (OUTPATIENT)
Dept: CARDIOLOGY CLINIC | Facility: CLINIC | Age: 61
End: 2021-05-20
Payer: COMMERCIAL

## 2021-05-20 DIAGNOSIS — Z95.818 PRESENCE OF OTHER CARDIAC IMPLANTS AND GRAFTS: Primary | ICD-10-CM

## 2021-05-20 PROCEDURE — G2066 INTER DEVC REMOTE 30D: HCPCS | Performed by: INTERNAL MEDICINE

## 2021-05-20 PROCEDURE — 93298 REM INTERROG DEV EVAL SCRMS: CPT | Performed by: INTERNAL MEDICINE

## 2021-05-20 NOTE — PROGRESS NOTES
Results for orders placed or performed in visit on 05/20/21   Cardiac EP device report    Narrative    MDT-LOOP RECORDER  CARELINK TRANSMISSION: BATTERY STATUS "OK"  NO PATIENT OR DEVICE ACTIVATED EPISODES  NORMAL DEVICE FUNCTION   NC       Current Outpatient Medications:     atorvastatin (LIPITOR) 40 mg tablet, Take 40 mg by mouth daily , Disp: , Rfl:     citalopram (CeleXA) 10 mg/5 mL suspension, Take 10 mg by mouth daily , Disp: , Rfl:     clopidogrel (PLAVIX) 75 mg tablet, Take 1 tablet (75 mg total) by mouth daily, Disp: 60 tablet, Rfl: 0    donepezil (ARICEPT) 10 mg tablet, Take 0 5 tablets (5 mg total) by mouth 2 (two) times a day, Disp: 60 tablet, Rfl: 1    donepezil (ARICEPT) 5 mg tablet, Take 1 tablet (5mg) by mouth 2 times a day , Disp: 180 tablet, Rfl: 3    fluticasone (FLONASE) 50 mcg/act nasal spray, 1 spray into each nostril daily, Disp: 16 g, Rfl: 0    ibuprofen (MOTRIN) 600 mg tablet, Take 1 tablet (600 mg total) by mouth every 6 (six) hours as needed for mild pain for up to 7 days, Disp: 28 tablet, Rfl: 0    Melatonin 10 MG TABS, Take 20 mg by mouth as needed Takes 2 tabs as needed , Disp: , Rfl:     metFORMIN (GLUCOPHAGE) 500 mg tablet, Take 1 tablet (500 mg total) by mouth daily with breakfast, Disp: 30 tablet, Rfl: 0    omeprazole (PriLOSEC OTC) 20 MG tablet, Take 20 mg by mouth daily, Disp: , Rfl:     sildenafil (REVATIO) 20 mg tablet, TAKE 2 3 TABLETS BY MOUTH AS NEEDED FOR SEXUAL ACTIVITY, Disp: , Rfl: 2    TOPROL  MG 24 hr tablet, Take 100 mg by mouth daily , Disp: , Rfl:

## 2021-06-29 ENCOUNTER — OFFICE VISIT (OUTPATIENT)
Dept: NEUROLOGY | Facility: CLINIC | Age: 61
End: 2021-06-29
Payer: COMMERCIAL

## 2021-06-29 VITALS
SYSTOLIC BLOOD PRESSURE: 190 MMHG | HEART RATE: 83 BPM | BODY MASS INDEX: 36.32 KG/M2 | WEIGHT: 226 LBS | HEIGHT: 66 IN | DIASTOLIC BLOOD PRESSURE: 80 MMHG

## 2021-06-29 DIAGNOSIS — Z86.73 HISTORY OF CVA (CEREBROVASCULAR ACCIDENT): Primary | ICD-10-CM

## 2021-06-29 DIAGNOSIS — R26.89 BALANCE PROBLEM: ICD-10-CM

## 2021-06-29 DIAGNOSIS — F01.50 VASCULAR DEMENTIA (HCC): ICD-10-CM

## 2021-06-29 PROCEDURE — 3008F BODY MASS INDEX DOCD: CPT | Performed by: PSYCHIATRY & NEUROLOGY

## 2021-06-29 PROCEDURE — 99215 OFFICE O/P EST HI 40 MIN: CPT | Performed by: PSYCHIATRY & NEUROLOGY

## 2021-06-29 PROCEDURE — 1036F TOBACCO NON-USER: CPT | Performed by: PSYCHIATRY & NEUROLOGY

## 2021-06-29 NOTE — PROGRESS NOTES
Patient ID: Deepti Cazares is a 57 y o  male  Assessment/Plan: This is a 58 y/o M who is here as a follow up for history of CVA and memory issues  PLAN:      Diagnoses and all orders for this visit:    History of CVA (cerebrovascular accident)  -for stroke prevention continue with combination of plavix and lipitor  -BP goal < 130/80, BP is at goal in the office    -checks labs - lipid panel and hemoglobin A1c    -will place PT referral     Counseling -   -does not smoke at this time   -denies any history of snoring    -I advised patient to avoid using NSAIDs for headaches or other pain and to stick to tylenol if needed  -Recommend mediterranean diet & regular exercise regimen atleast 4-5 times a week for 20-30 minutes  -I educated patient/family regarding medication compliance  -     Lipid panel; Future        Dementia   Last MOCA 18/30  -is currently driving  -is on aricept 10mg in AM and 5mg in PM  -has noticed a difference as far as memory is concerned  Follow up -6 years    I would be happy to see the patient sooner if any new questions/concerns arise  Patient/Guardian was advised to the call the office if they have any questions and concerns in the meantime  Patient/Guardian does understand that if they have any new stroke like symptoms such as facial droop on one side, weakness/paralysis on either side, speech trouble, numbness on one side, balance issues, any vision changes, extreme dizziness or any new headache, to call 9-1-1 immediately or to proceed to the nearest ER immediately  Face to face time spent 19 minutes  Greater than 50% of total time was spent with the patient and or family counseling and or coordination of care  Non face to face time spent 22  minutes  Subjective:    HPI    This is a 58 y/o M who is here as a follow up for history of CVA and memory issues  He is doing well and he is more active, and doing more yardwork   He says he still has balance issues and feels weak on the bilateral lower extremities  No falls  He ambulates without any assistance  He denies any new TIA/CVA Like symptoms  He is complaint w/ his medications  He says his memory is improved since he started aricept 5mg PO BID  He is more slow he states  He is on medication for mood changes prescribed by his PCP which does seem to be helping  The following portions of the patient's history were reviewed and updated as appropriate:   He  has a past medical history of Diabetes mellitus (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 [facility] ) 03/28/2019    Constipation 03/27/2019    Urinary retention 03/27/2019    Hypertriglyceridemia 03/26/2019    Nicotine dependence 03/26/2019    Stenosis of right carotid artery 03/26/2019    Presbyopia 03/26/2019    History of stroke 03/19/2019    Headache 03/19/2019    Hypertension 03/19/2019    GERD (gastroesophageal reflux disease) 03/19/2019     He  has a past surgical history that includes Back surgery; Shoulder surgery; and IR stroke alert (3/19/2019)  His family history includes Diabetes in his mother; Heart attack in his mother; Prostate cancer in his father  He  reports that he has quit smoking  He has never used smokeless tobacco  He reports previous alcohol use  He reports that he does not use drugs    Current Outpatient Medications   Medication Sig Dispense Refill    atorvastatin (LIPITOR) 40 mg tablet Take 40 mg by mouth daily       citalopram (CeleXA) 10 mg/5 mL suspension Take 10 mg by mouth daily       clopidogrel (PLAVIX) 75 mg tablet Take 1 tablet (75 mg total) by mouth daily 60 tablet 0    donepezil (ARICEPT) 10 mg tablet Take 0 5 tablets (5 mg total) by mouth 2 (two) times a day 60 tablet 1    donepezil (ARICEPT) 5 mg tablet Take 1 tablet (5mg) by mouth 2 times a day  180 tablet 3    fluticasone (FLONASE) 50 mcg/act nasal spray 1 spray into each nostril daily 16 g 0    Melatonin 10 MG TABS Take 20 mg by mouth as needed Takes 2 tabs as needed       metFORMIN (GLUCOPHAGE) 500 mg tablet Take 1 tablet (500 mg total) by mouth daily with breakfast 30 tablet 0    omeprazole (PriLOSEC OTC) 20 MG tablet Take 20 mg by mouth daily      sildenafil (REVATIO) 20 mg tablet TAKE 2 3 TABLETS BY MOUTH AS NEEDED FOR SEXUAL ACTIVITY  2    TOPROL  MG 24 hr tablet Take 100 mg by mouth daily       ibuprofen (MOTRIN) 600 mg tablet Take 1 tablet (600 mg total) by mouth every 6 (six) hours as needed for mild pain for up to 7 days 28 tablet 0     No current facility-administered medications for this visit  Current Outpatient Medications on File Prior to Visit   Medication Sig    atorvastatin (LIPITOR) 40 mg tablet Take 40 mg by mouth daily     citalopram (CeleXA) 10 mg/5 mL suspension Take 10 mg by mouth daily     clopidogrel (PLAVIX) 75 mg tablet Take 1 tablet (75 mg total) by mouth daily    donepezil (ARICEPT) 10 mg tablet Take 0 5 tablets (5 mg total) by mouth 2 (two) times a day    donepezil (ARICEPT) 5 mg tablet Take 1 tablet (5mg) by mouth 2 times a day      fluticasone (FLONASE) 50 mcg/act nasal spray 1 spray into each nostril daily    Melatonin 10 MG TABS Take 20 mg by mouth as needed Takes 2 tabs as needed     metFORMIN (GLUCOPHAGE) 500 mg tablet Take 1 tablet (500 mg total) by mouth daily with breakfast    omeprazole (PriLOSEC OTC) 20 MG tablet Take 20 mg by mouth daily    sildenafil (REVATIO) 20 mg tablet TAKE 2 3 TABLETS BY MOUTH AS NEEDED FOR SEXUAL ACTIVITY    TOPROL  MG 24 hr tablet Take 100 mg by mouth daily     ibuprofen (MOTRIN) 600 mg tablet Take 1 tablet (600 mg total) by mouth every 6 (six) hours as needed for mild pain for up to 7 days     No current facility-administered medications on file prior to visit  He has No Known Allergies            Objective:    Blood pressure (!) 190/80, pulse 83, height 5' 6" (1 676 m), weight 103 kg (226 lb)  Physical Exam    Neurological Exam      ROS:  I personally reviewed ROS   Review of Systems   Constitutional: Negative  Negative for appetite change and fever  HENT: Negative  Negative for hearing loss, tinnitus, trouble swallowing and voice change  Eyes: Negative  Negative for photophobia and pain  Respiratory: Negative  Negative for shortness of breath  Cardiovascular: Negative  Negative for palpitations  Gastrointestinal: Negative  Negative for nausea and vomiting  Endocrine: Negative  Negative for cold intolerance  Genitourinary: Negative  Negative for dysuria, frequency and urgency  Musculoskeletal: Positive for gait problem  Negative for myalgias and neck pain  Skin: Negative  Negative for rash  Neurological: Positive for weakness  Negative for dizziness, tremors, seizures, syncope, facial asymmetry, speech difficulty, light-headedness, numbness and headaches  Patient stated that he has bilateral leg weakness off and on  Hematological: Negative  Does not bruise/bleed easily  Psychiatric/Behavioral: Negative  Negative for confusion, hallucinations and sleep disturbance

## 2021-08-04 ENCOUNTER — REMOTE DEVICE CLINIC VISIT (OUTPATIENT)
Dept: CARDIOLOGY CLINIC | Facility: CLINIC | Age: 61
End: 2021-08-04
Payer: COMMERCIAL

## 2021-08-04 DIAGNOSIS — Z95.818 PRESENCE OF OTHER CARDIAC IMPLANTS AND GRAFTS: Primary | ICD-10-CM

## 2021-08-04 PROCEDURE — 93298 REM INTERROG DEV EVAL SCRMS: CPT | Performed by: INTERNAL MEDICINE

## 2021-08-04 PROCEDURE — G2066 INTER DEVC REMOTE 30D: HCPCS | Performed by: INTERNAL MEDICINE

## 2021-08-04 NOTE — PROGRESS NOTES
MDT-LOOP RECORDER   CARELINK TRANSMISSION: LOOP RECORDER  PRESENTING RHYTHM NSR W/ PVC @ 78 BPM  BATTERY STATUS "OK " NO PATIENT OR DEVICE ACTIVATED EPISODES  NORMAL DEVICE FUNCTION   DL

## 2021-11-09 ENCOUNTER — REMOTE DEVICE CLINIC VISIT (OUTPATIENT)
Dept: CARDIOLOGY CLINIC | Facility: CLINIC | Age: 61
End: 2021-11-09
Payer: COMMERCIAL

## 2021-11-09 DIAGNOSIS — Z95.818 PRESENCE OF OTHER CARDIAC IMPLANTS AND GRAFTS: Primary | ICD-10-CM

## 2021-11-09 PROCEDURE — G2066 INTER DEVC REMOTE 30D: HCPCS | Performed by: INTERNAL MEDICINE

## 2021-11-09 PROCEDURE — 93298 REM INTERROG DEV EVAL SCRMS: CPT | Performed by: INTERNAL MEDICINE

## 2022-01-06 ENCOUNTER — OFFICE VISIT (OUTPATIENT)
Dept: NEUROLOGY | Facility: CLINIC | Age: 62
End: 2022-01-06
Payer: MEDICARE

## 2022-01-06 VITALS
TEMPERATURE: 97.8 F | WEIGHT: 221 LBS | DIASTOLIC BLOOD PRESSURE: 80 MMHG | HEIGHT: 66 IN | SYSTOLIC BLOOD PRESSURE: 130 MMHG | BODY MASS INDEX: 35.52 KG/M2 | HEART RATE: 75 BPM

## 2022-01-06 DIAGNOSIS — I10 PRIMARY HYPERTENSION: ICD-10-CM

## 2022-01-06 DIAGNOSIS — Z86.73 HISTORY OF STROKE: ICD-10-CM

## 2022-01-06 DIAGNOSIS — Z95.818 STATUS POST PLACEMENT OF IMPLANTABLE LOOP RECORDER: ICD-10-CM

## 2022-01-06 DIAGNOSIS — I69.30 CHRONIC ISCHEMIC RIGHT MCA STROKE: ICD-10-CM

## 2022-01-06 DIAGNOSIS — R33.9 URINARY RETENTION: Primary | ICD-10-CM

## 2022-01-06 PROBLEM — F01.50 VASCULAR DEMENTIA (HCC): Status: RESOLVED | Noted: 2021-03-02 | Resolved: 2022-01-06

## 2022-01-06 PROCEDURE — 99214 OFFICE O/P EST MOD 30 MIN: CPT | Performed by: PSYCHIATRY & NEUROLOGY

## 2022-01-06 NOTE — PROGRESS NOTES
Patient ID: Laurie Strickland is a 57 y o  male  Assessment/Plan:    History of stroke  56 y/o m with h/o HTN, HLD, GERD, prior smoker  Presenting for follow up for R MCA stroke with residual left hand clumsiness on Plavix and atorvastatin  Patient had stroke on 03/18/2019  Symptoms included-confusion, slurred speech, unable to drive, episodes of bilateral arm and leg weakness  CT showed right MCA occlusion and right carotid stenosis  S/p Thrombectomy  MRI showed multiple right MCA territory infarct  Loop recorder was placed in 3/2019 and has never shown afib, last interrogated on 11/9/21  Plan-   Continue Plavix 75 mg p o  Q d  Continue atorvastatin 40 mg p o  Q d  Blood pressure goal less than 130/90 - continue to check periodically at home  Defer HTN management to PCP  Follow-up in 1 year      Memory difficulties  Started years ago but more pronounced after Stroke, Some difficulty with short term memory  He forgets what channel on TV he was watching at times  He has to repeat a lot, word finding difficulty  He denies issues with cooking except one time he put plastic in oven, no issues with grooming, directions  Previously scored as low as 18/30 on MOCA but at today's visit scored 25/30 and states he feels much better and more alert than previous  Unclear why he scored worse previously, perhaps secondary to sleep issues and depression  Plan:  - continue to monitor  - continue aricept as prescribed       Diagnoses and all orders for this visit:    Urinary retention    Primary hypertension    Chronic ischemic right MCA stroke    Status post placement of implantable loop recorder    History of stroke           Subjective:    Laurie Strickland is a 57 y o  male presenting for stroke follow up  To review, he initially presented after a stroke in 3/2019 with residual left handed weakness/clumsiness   He was previously also concerned about memory issues and previously scored 18/30 on [address] [place] [address] and was started on aricept  He has a loop recorder which was placed during his hospitalization in 3/19 with no episodes of afib ever recorders and he is compliant with his plavix and statin  Since his last visit he reports significant improvement and feels much better than he did  He also feels his memory has improved and his wife also notes this  In today's visit, he scored a 21/29 on the [facility] (see below for details)  He has no new concerns or complaints and feels very well overall  He reports a recent small fall off a ladder injuring his left shoulder and he sees ortho at [facility] for this  Also reviewed a sleep study he had performed in 2020 which demonstrated moderate YARITZA but no recommendations were made for CPAP at that time, he is aware he needs to schedule follow up with them  Loop was most recently interrogated in 11/21 with normal function and no afib  The following portions of the patient's history were reviewed and updated as appropriate: allergies, current medications, past family history, past medical history, past social history, past surgical history and problem list          Objective:    Blood pressure 130/80, pulse 75, temperature 97 8 °F (36 6 °C), temperature source Temporal, height 5' 6" (1 676 m), weight 100 kg (221 lb)      Mental Status:      MOCA total = 25/30  Visual spatial/executive:    Trails (1)  [x]   copy cube (1)  [x]   draw clock - "ten past 11" (3)   Contour  [x]  Numbers  [x]  Hands  []     Naming:   Drawings (3)  Lion/Camel  [x] Rhino/Bear  [x] Camel/Rhino  [x]     Memory/Delayed Recall:  (5)   Word recall Immediate  Memory  (no points) Delayed  (5 pts) Category Clue Multiple choice   Face  [x]   []  Part of the Body    [x]  Nose, Face, hand             []    Velvet  [x]   [x]  Type of Fabric       []  Pradeep, [address], [place]      []    W  R  Ebony  [x]   []  Type of Building    [x]  [address] [facility], [address]  []    Lana  [x]   [x]  Type of Flower      []  Marciirina Gill            [] Red  [x]   [x]  A Color                  []  Red, Blue, Green              []      Attention:   Digits (2)  Forwards: [address]  [x]   Backwards: 841  []     Llist of letters, tap for A (1) [x]   F B A C M N A A J K L B A F A K D E A A A J A M O F A A B     Serial 7s (3 pts 4-5 correct, 2pts 2-3 correct, 1 pt for 1 correct)   [x] 93,  [x] 86,  [x] 79,  [x] 72,  [x] 65    Language:   Repeat sentences (2)  I only know that Markell Paiz is the one to help today  [x]   The cat always hid under the couch when dogs were in the room  [x]     Number of words beginning with letter F  (>11 in 1 min) (1)  []     Abstraction:  (2)  train-bicycle (means of transportation, means of travelling, you take trips in both) [x]   watch-ruler (measuring instruments, used to measure)  [x]     Orientation:  (6)  Year [x]  Date [x]  Day [x]  Month [x]  Place [x]  City [x]       Physical Exam  Vitals reviewed  Constitutional:       General: He is not in acute distress  Appearance: Normal appearance  He is not ill-appearing  HENT:      Head: Normocephalic and atraumatic  Eyes:      Extraocular Movements: Extraocular movements intact  Conjunctiva/sclera: Conjunctivae normal       Pupils: Pupils are equal, round, and reactive to light  Cardiovascular:      Rate and Rhythm: Normal rate  Pulmonary:      Effort: Pulmonary effort is normal  No respiratory distress  Musculoskeletal:         General: Tenderness (left shoulder) present  Neurological:      Mental Status: He is oriented to person, place, and time  Coordination: Romberg sign negative  Deep Tendon Reflexes: Strength normal    Psychiatric:         Mood and Affect: Mood normal          Speech: Speech normal          Behavior: Behavior normal          Thought Content: Thought content normal          Judgment: Judgment normal          Neurological Exam  Mental Status  Awake, alert and oriented to person, place and time  Recalls 3 of 3 objects immediately   At 5 minutes recalls 2 of 3 objects  Recalls 3 of 3 objects with prompting  Able to copy figure  Clock drawing is abnormal  Speech is normal  Language is fluent with no aphasia  Able to perform serial calculations  Fund of knowledge is appropriate for level of education  Cranial Nerves  CN II: Visual fields full to confrontation  CN III, IV, VI: Extraocular movements intact bilaterally  Pupils equal round and reactive to light bilaterally  CN V: Facial sensation is normal   CN VII: Full and symmetric facial movement  CN VIII: Hearing is normal   CN IX, X: Palate elevates symmetrically  CN XI: Shoulder shrug strength is normal   CN XII: Tongue midline without atrophy or fasciculations  Motor  Normal muscle bulk throughout  No fasciculations present  Increased muscle tone  Left side  No abnormal involuntary movements  Strength is 5/5 throughout all four extremities  Sensory  Light touch is normal in upper and lower extremities  Pinprick is normal in upper and lower extremities  Reflexes  Reflexes 2+ throughout, slightly brisker on left brachioradialis and bicep  Coordination  Right: Finger-to-nose normal  Rapid alternating movement normal   Left: Finger-to-nose normal  Rapid alternating movement abnormality:  Left hand is clumsy, rapid alternating movements and fine motor movements are slower than right side  Gait  Normal casual, toe, heel and tandem gait  Romberg is absent  Able to rise from chair without using arms  ROS:    Review of Systems   Constitutional: Positive for appetite change  Negative for fever  HENT: Negative  Negative for hearing loss, tinnitus, trouble swallowing and voice change  Eyes: Negative  Negative for photophobia and pain  Respiratory: Negative  Negative for shortness of breath  Cardiovascular: Negative  Negative for palpitations  Gastrointestinal: Negative  Negative for nausea and vomiting  Endocrine: Negative  Negative for cold intolerance  Genitourinary: Negative  Negative for dysuria, frequency and urgency  Musculoskeletal: Positive for back pain and gait problem  Negative for myalgias and neck pain  Skin: Negative  Negative for rash  Neurological: Positive for weakness and numbness  Negative for dizziness, tremors, seizures, syncope, facial asymmetry, speech difficulty, light-headedness and headaches  Patient stated that he has right hand numbness  Patient stated that he has bilateral hand weakness  Hematological: Negative  Does not bruise/bleed easily  Psychiatric/Behavioral: Negative  Negative for confusion, hallucinations and sleep disturbance  Review of systems as documented by the MA was reviewed in full by myself, Anant Onofre MD      More than 50% of this time spent with the patient was devoted to counseling and coordination of care  Issues addressed during this clinic visit included overall management, medication counseling or monitoring (including adverse effects, side effects and risks of medications)

## 2022-01-06 NOTE — ASSESSMENT & PLAN NOTE
56 y/o m with h/o HTN, HLD, GERD, prior smoker  Presenting for follow up for R MCA stroke with residual left hand clumsiness on Plavix and atorvastatin  Patient had stroke on 03/18/2019  Symptoms included-confusion, slurred speech, unable to drive, episodes of bilateral arm and leg weakness  CT showed right MCA occlusion and right carotid stenosis  S/p Thrombectomy  MRI showed multiple right MCA territory infarct  Loop recorder was placed in 3/2019 and has never shown afib, last interrogated on 11/9/21  Plan-   Continue Plavix 75 mg p o  Q d  Continue atorvastatin 40 mg p o  Q d    Blood pressure goal less than 130/90 - continue to check periodically at home  Defer HTN management to PCP  Follow-up in 1 year

## 2022-01-06 NOTE — ASSESSMENT & PLAN NOTE
Started years ago but more pronounced after Stroke, Some difficulty with short term memory  He forgets what channel on TV he was watching at times  He has to repeat a lot, word finding difficulty  He denies issues with cooking except one time he put plastic in oven, no issues with grooming, directions  Previously scored as low as 18/30 on MOCA but at today's visit scored 25/30 and states he feels much better and more alert than previous  Unclear why he scored worse previously, perhaps secondary to sleep issues and depression      Plan:  - continue to monitor  - continue aricept as prescribed

## 2022-02-08 ENCOUNTER — REMOTE DEVICE CLINIC VISIT (OUTPATIENT)
Dept: CARDIOLOGY CLINIC | Facility: CLINIC | Age: 62
End: 2022-02-08
Payer: MEDICARE

## 2022-02-08 DIAGNOSIS — Z95.818 PRESENCE OF OTHER CARDIAC IMPLANTS AND GRAFTS: Primary | ICD-10-CM

## 2022-02-08 PROCEDURE — 93298 REM INTERROG DEV EVAL SCRMS: CPT | Performed by: INTERNAL MEDICINE

## 2022-02-08 PROCEDURE — G2066 INTER DEVC REMOTE 30D: HCPCS | Performed by: INTERNAL MEDICINE

## 2022-02-08 NOTE — PROGRESS NOTES
MDT-LOOP RECORDER   CAREL[employer] TRANSMISSION: LOOP RECORDER  PRESENTING RHYTHM NSR @ 68 BPM  BATTERY STATUS "OK " NO PATIENT OR DEVICE ACTIVATED EPISODES  NORMAL DEVICE FUNCTION   DL

## 2022-02-10 DIAGNOSIS — R41.3 MEMORY LOSS: ICD-10-CM

## 2022-02-13 RX ORDER — DONEPEZIL HYDROCHLORIDE 5 MG/1
TABLET, FILM COATED ORAL
Qty: 180 TABLET | Refills: 3 | Status: SHIPPED | OUTPATIENT
Start: 2022-02-13

## 2022-02-18 ENCOUNTER — APPOINTMENT (EMERGENCY)
Dept: CT IMAGING | Facility: HOSPITAL | Age: 62
End: 2022-02-18
Payer: MEDICARE

## 2022-02-18 ENCOUNTER — APPOINTMENT (EMERGENCY)
Dept: RADIOLOGY | Facility: HOSPITAL | Age: 62
End: 2022-02-18
Payer: MEDICARE

## 2022-02-18 ENCOUNTER — HOSPITAL ENCOUNTER (EMERGENCY)
Facility: HOSPITAL | Age: 62
Discharge: HOME | End: 2022-02-19
Attending: EMERGENCY MEDICINE
Payer: MEDICARE

## 2022-02-18 DIAGNOSIS — R51.9 ACUTE HEADACHE: Primary | ICD-10-CM

## 2022-02-18 DIAGNOSIS — R53.1 GENERALIZED WEAKNESS: ICD-10-CM

## 2022-02-18 LAB
ALBUMIN SERPL BCP-MCNC: 3.7 G/DL (ref 3.5–5)
ALP SERPL-CCNC: 108 U/L (ref 46–116)
ALT SERPL W P-5'-P-CCNC: 36 U/L (ref 12–78)
ANION GAP SERPL CALCULATED.3IONS-SCNC: 8 MMOL/L (ref 4–13)
APTT PPP: 31 SECONDS (ref 23–37)
AST SERPL W P-5'-P-CCNC: 21 U/L (ref 5–45)
BASOPHILS # BLD AUTO: 0.08 THOUSANDS/ΜL (ref 0–0.1)
BASOPHILS NFR BLD AUTO: 1 % (ref 0–1)
BILIRUB SERPL-MCNC: 0.5 MG/DL (ref 0.2–1)
BUN SERPL-MCNC: 16 MG/DL (ref 5–25)
CALCIUM SERPL-MCNC: 8.9 MG/DL (ref 8.3–10.1)
CARDIAC TROPONIN I PNL SERPL HS: 13 NG/L
CHLORIDE SERPL-SCNC: 102 MMOL/L (ref 100–108)
CO2 SERPL-SCNC: 27 MMOL/L (ref 21–32)
CREAT SERPL-MCNC: 1.09 MG/DL (ref 0.6–1.3)
EOSINOPHIL # BLD AUTO: 0.32 THOUSAND/ΜL (ref 0–0.61)
EOSINOPHIL NFR BLD AUTO: 3 % (ref 0–6)
ERYTHROCYTE [DISTWIDTH] IN BLOOD BY AUTOMATED COUNT: 13.1 % (ref 11.6–15.1)
GFR SERPL CREATININE-BSD FRML MDRD: 71 ML/MIN/1.73SQ M
GLUCOSE SERPL-MCNC: 172 MG/DL (ref 65–140)
HCT VFR BLD AUTO: 49 % (ref 36.5–49.3)
HGB BLD-MCNC: 15.8 G/DL (ref 12–17)
IMM GRANULOCYTES # BLD AUTO: 0.04 THOUSAND/UL (ref 0–0.2)
IMM GRANULOCYTES NFR BLD AUTO: 0 % (ref 0–2)
INR PPP: 1.07 (ref 0.84–1.19)
LYMPHOCYTES # BLD AUTO: 3.51 THOUSANDS/ΜL (ref 0.6–4.47)
LYMPHOCYTES NFR BLD AUTO: 32 % (ref 14–44)
MCH RBC QN AUTO: 31.1 PG (ref 26.8–34.3)
MCHC RBC AUTO-ENTMCNC: 32.2 G/DL (ref 31.4–37.4)
MCV RBC AUTO: 97 FL (ref 82–98)
MONOCYTES # BLD AUTO: 0.7 THOUSAND/ΜL (ref 0.17–1.22)
MONOCYTES NFR BLD AUTO: 6 % (ref 4–12)
NEUTROPHILS # BLD AUTO: 6.4 THOUSANDS/ΜL (ref 1.85–7.62)
NEUTS SEG NFR BLD AUTO: 58 % (ref 43–75)
NRBC BLD AUTO-RTO: 0 /100 WBCS
PLATELET # BLD AUTO: 229 THOUSANDS/UL (ref 149–390)
PMV BLD AUTO: 10 FL (ref 8.9–12.7)
POTASSIUM SERPL-SCNC: 3.7 MMOL/L (ref 3.5–5.3)
PROT SERPL-MCNC: 7.2 G/DL (ref 6.4–8.2)
PROTHROMBIN TIME: 13.8 SECONDS (ref 11.6–14.5)
RBC # BLD AUTO: 5.08 MILLION/UL (ref 3.88–5.62)
SODIUM SERPL-SCNC: 137 MMOL/L (ref 136–145)
WBC # BLD AUTO: 11.05 THOUSAND/UL (ref 4.31–10.16)

## 2022-02-18 PROCEDURE — 93005 ELECTROCARDIOGRAM TRACING: CPT

## 2022-02-18 PROCEDURE — 85730 THROMBOPLASTIN TIME PARTIAL: CPT

## 2022-02-18 PROCEDURE — 99285 EMERGENCY DEPT VISIT HI MDM: CPT

## 2022-02-18 PROCEDURE — 99285 EMERGENCY DEPT VISIT HI MDM: CPT | Performed by: EMERGENCY MEDICINE

## 2022-02-18 PROCEDURE — 36415 COLL VENOUS BLD VENIPUNCTURE: CPT

## 2022-02-18 PROCEDURE — 85610 PROTHROMBIN TIME: CPT

## 2022-02-18 PROCEDURE — 71045 X-RAY EXAM CHEST 1 VIEW: CPT

## 2022-02-18 PROCEDURE — 84484 ASSAY OF TROPONIN QUANT: CPT

## 2022-02-18 PROCEDURE — 96375 TX/PRO/DX INJ NEW DRUG ADDON: CPT

## 2022-02-18 PROCEDURE — 96365 THER/PROPH/DIAG IV INF INIT: CPT

## 2022-02-18 PROCEDURE — 96361 HYDRATE IV INFUSION ADD-ON: CPT

## 2022-02-18 PROCEDURE — 85025 COMPLETE CBC W/AUTO DIFF WBC: CPT

## 2022-02-18 PROCEDURE — G1004 CDSM NDSC: HCPCS

## 2022-02-18 PROCEDURE — 70496 CT ANGIOGRAPHY HEAD: CPT

## 2022-02-18 PROCEDURE — 80053 COMPREHEN METABOLIC PANEL: CPT

## 2022-02-18 PROCEDURE — 70498 CT ANGIOGRAPHY NECK: CPT

## 2022-02-18 RX ORDER — METOCLOPRAMIDE HYDROCHLORIDE 5 MG/ML
10 INJECTION INTRAMUSCULAR; INTRAVENOUS ONCE
Status: COMPLETED | OUTPATIENT
Start: 2022-02-18 | End: 2022-02-18

## 2022-02-18 RX ORDER — MAGNESIUM SULFATE HEPTAHYDRATE 40 MG/ML
2 INJECTION, SOLUTION INTRAVENOUS ONCE
Status: COMPLETED | OUTPATIENT
Start: 2022-02-18 | End: 2022-02-18

## 2022-02-18 RX ADMIN — MAGNESIUM SULFATE HEPTAHYDRATE 2 G: 40 INJECTION, SOLUTION INTRAVENOUS at 22:28

## 2022-02-18 RX ADMIN — METOCLOPRAMIDE 10 MG: 5 INJECTION, SOLUTION INTRAMUSCULAR; INTRAVENOUS at 22:17

## 2022-02-18 RX ADMIN — IOHEXOL 100 ML: 350 INJECTION, SOLUTION INTRAVENOUS at 22:12

## 2022-02-18 RX ADMIN — SODIUM CHLORIDE 1000 ML: 0.9 INJECTION, SOLUTION INTRAVENOUS at 22:16

## 2022-02-19 VITALS
SYSTOLIC BLOOD PRESSURE: 127 MMHG | HEART RATE: 56 BPM | OXYGEN SATURATION: 96 % | RESPIRATION RATE: 16 BRPM | DIASTOLIC BLOOD PRESSURE: 63 MMHG | BODY MASS INDEX: 35.52 KG/M2 | TEMPERATURE: 98 F | HEIGHT: 66 IN | WEIGHT: 221 LBS

## 2022-02-19 LAB
2HR DELTA HS TROPONIN: -1 NG/L
CARDIAC TROPONIN I PNL SERPL HS: 12 NG/L

## 2022-02-19 PROCEDURE — 96361 HYDRATE IV INFUSION ADD-ON: CPT

## 2022-02-21 LAB
ATRIAL RATE: 68 BPM
P AXIS: 30 DEGREES
PR INTERVAL: 158 MS
QRS AXIS: 16 DEGREES
QRSD INTERVAL: 102 MS
QT INTERVAL: 436 MS
QTC INTERVAL: 463 MS
T WAVE AXIS: -21 DEGREES
VENTRICULAR RATE: 68 BPM

## 2022-02-21 PROCEDURE — 93010 ELECTROCARDIOGRAM REPORT: CPT | Performed by: INTERNAL MEDICINE

## 2022-05-10 ENCOUNTER — REMOTE DEVICE CLINIC VISIT (OUTPATIENT)
Dept: CARDIOLOGY CLINIC | Facility: CLINIC | Age: 62
End: 2022-05-10
Payer: MEDICARE

## 2022-05-10 DIAGNOSIS — Z95.818 PRESENCE OF OTHER CARDIAC IMPLANTS AND GRAFTS: Primary | ICD-10-CM

## 2022-05-10 PROCEDURE — G2066 INTER DEVC REMOTE 30D: HCPCS | Performed by: INTERNAL MEDICINE

## 2022-05-10 PROCEDURE — 93298 REM INTERROG DEV EVAL SCRMS: CPT | Performed by: INTERNAL MEDICINE

## 2022-05-10 NOTE — PROGRESS NOTES
MDT-LOOP RECORDER   [employer] TRANSMISSION: LOOP RECORDER  PRESENTING RHYTHM NSR @ 76 BPM  BATTERY STATUS "OK " NO PATIENT OR DEVICE ACTIVATED EPISODES  NORMAL DEVICE FUNCTION   DL

## 2022-08-15 ENCOUNTER — REMOTE DEVICE CLINIC VISIT (OUTPATIENT)
Dept: CARDIOLOGY CLINIC | Facility: CLINIC | Age: 62
End: 2022-08-15
Payer: COMMERCIAL

## 2022-08-15 DIAGNOSIS — Z95.818 PRESENCE OF OTHER CARDIAC IMPLANTS AND GRAFTS: Primary | ICD-10-CM

## 2022-08-15 PROCEDURE — G2066 INTER DEVC REMOTE 30D: HCPCS | Performed by: INTERNAL MEDICINE

## 2022-08-15 PROCEDURE — 93298 REM INTERROG DEV EVAL SCRMS: CPT | Performed by: INTERNAL MEDICINE

## 2022-08-15 NOTE — PROGRESS NOTES
MDT-LOOP RECORDER   CAREL[employer] TRANSMISSION: LOOP RECORDER  PRESENTING RHYTHM NSR @ 77 BPM  BATTERY STATUS "OK " NO PATIENT OR DEVICE ACTIVATED EPISODES  NORMAL DEVICE FUNCTION   DL

## 2022-11-01 ENCOUNTER — TELEPHONE (OUTPATIENT)
Dept: NEUROLOGY | Facility: CLINIC | Age: 62
End: 2022-11-01

## 2022-11-01 NOTE — TELEPHONE ENCOUNTER
Called patient spoke with patient that his appointment has to be rescheduled due to provider will be out of office  Patient stated that he will call back

## 2022-11-08 ENCOUNTER — REMOTE DEVICE CLINIC VISIT (OUTPATIENT)
Dept: CARDIOLOGY CLINIC | Facility: CLINIC | Age: 62
End: 2022-11-08

## 2022-11-08 DIAGNOSIS — Z95.818 PRESENCE OF OTHER CARDIAC IMPLANTS AND GRAFTS: Primary | ICD-10-CM

## 2022-11-14 ENCOUNTER — TELEPHONE (OUTPATIENT)
Dept: NEUROLOGY | Facility: CLINIC | Age: 62
End: 2022-11-14

## 2022-11-14 NOTE — TELEPHONE ENCOUNTER
Called patient and spoke with patient wife to inform her that patient appointment has been rescheduled due to provider will be out of office  I mail new appointment card with date time and location to patient

## 2023-01-12 ENCOUNTER — APPOINTMENT (OUTPATIENT)
Dept: MRI IMAGING | Facility: HOSPITAL | Age: 63
End: 2023-01-12

## 2023-01-12 ENCOUNTER — HOSPITAL ENCOUNTER (OUTPATIENT)
Facility: HOSPITAL | Age: 63
Setting detail: OBSERVATION
Discharge: HOME | End: 2023-01-13
Attending: EMERGENCY MEDICINE | Admitting: INTERNAL MEDICINE

## 2023-01-12 ENCOUNTER — APPOINTMENT (EMERGENCY)
Dept: CT IMAGING | Facility: HOSPITAL | Age: 63
End: 2023-01-12

## 2023-01-12 ENCOUNTER — APPOINTMENT (EMERGENCY)
Dept: RADIOLOGY | Facility: HOSPITAL | Age: 63
End: 2023-01-12

## 2023-01-12 DIAGNOSIS — R41.0 TRANSIENT CONFUSION: ICD-10-CM

## 2023-01-12 DIAGNOSIS — I67.4 HYPERTENSIVE ENCEPHALOPATHY: ICD-10-CM

## 2023-01-12 DIAGNOSIS — I63.511 RIGHT MIDDLE CEREBRAL ARTERY STROKE (HCC): ICD-10-CM

## 2023-01-12 DIAGNOSIS — Z86.73 HISTORY OF STROKE: Primary | ICD-10-CM

## 2023-01-12 DIAGNOSIS — I65.21 STENOSIS OF RIGHT CAROTID ARTERY: ICD-10-CM

## 2023-01-12 DIAGNOSIS — R41.3 MEMORY LOSS: ICD-10-CM

## 2023-01-12 PROBLEM — E11.9 TYPE 2 DIABETES MELLITUS, WITHOUT LONG-TERM CURRENT USE OF INSULIN (HCC): Status: ACTIVE | Noted: 2019-04-03

## 2023-01-12 PROBLEM — G93.40 ENCEPHALOPATHY: Status: ACTIVE | Noted: 2023-01-12

## 2023-01-12 LAB
ALBUMIN SERPL BCP-MCNC: 3.6 G/DL (ref 3.5–5)
ALP SERPL-CCNC: 108 U/L (ref 46–116)
ALT SERPL W P-5'-P-CCNC: 34 U/L (ref 12–78)
ANION GAP SERPL CALCULATED.3IONS-SCNC: 3 MMOL/L (ref 4–13)
AST SERPL W P-5'-P-CCNC: 21 U/L (ref 5–45)
ATRIAL RATE: 72 BPM
BACTERIA UR QL AUTO: ABNORMAL /HPF
BASOPHILS # BLD AUTO: 0.08 THOUSANDS/ÂΜL (ref 0–0.1)
BASOPHILS NFR BLD AUTO: 1 % (ref 0–1)
BILIRUB SERPL-MCNC: 0.3 MG/DL (ref 0.2–1)
BILIRUB UR QL STRIP: NEGATIVE
BUN SERPL-MCNC: 19 MG/DL (ref 5–25)
CALCIUM SERPL-MCNC: 8.9 MG/DL (ref 8.3–10.1)
CARDIAC TROPONIN I PNL SERPL HS: 13 NG/L
CHLORIDE SERPL-SCNC: 105 MMOL/L (ref 96–108)
CLARITY UR: CLEAR
CO2 SERPL-SCNC: 26 MMOL/L (ref 21–32)
COLOR UR: YELLOW
CREAT SERPL-MCNC: 0.92 MG/DL (ref 0.6–1.3)
EOSINOPHIL # BLD AUTO: 0.23 THOUSAND/ÂΜL (ref 0–0.61)
EOSINOPHIL NFR BLD AUTO: 2 % (ref 0–6)
ERYTHROCYTE [DISTWIDTH] IN BLOOD BY AUTOMATED COUNT: 12.9 % (ref 11.6–15.1)
GFR SERPL CREATININE-BSD FRML MDRD: 88 ML/MIN/1.73SQ M
GLUCOSE SERPL-MCNC: 158 MG/DL (ref 65–140)
GLUCOSE UR STRIP-MCNC: ABNORMAL MG/DL
HCT VFR BLD AUTO: 47.8 % (ref 36.5–49.3)
HGB BLD-MCNC: 15.9 G/DL (ref 12–17)
HGB UR QL STRIP.AUTO: NEGATIVE
IMM GRANULOCYTES # BLD AUTO: 0.03 THOUSAND/UL (ref 0–0.2)
IMM GRANULOCYTES NFR BLD AUTO: 0 % (ref 0–2)
KETONES UR STRIP-MCNC: NEGATIVE MG/DL
LEUKOCYTE ESTERASE UR QL STRIP: NEGATIVE
LYMPHOCYTES # BLD AUTO: 2.88 THOUSANDS/ÂΜL (ref 0.6–4.47)
LYMPHOCYTES NFR BLD AUTO: 29 % (ref 14–44)
MCH RBC QN AUTO: 31.7 PG (ref 26.8–34.3)
MCHC RBC AUTO-ENTMCNC: 33.3 G/DL (ref 31.4–37.4)
MCV RBC AUTO: 95 FL (ref 82–98)
MONOCYTES # BLD AUTO: 0.97 THOUSAND/ÂΜL (ref 0.17–1.22)
MONOCYTES NFR BLD AUTO: 10 % (ref 4–12)
NEUTROPHILS # BLD AUTO: 5.7 THOUSANDS/ÂΜL (ref 1.85–7.62)
NEUTS SEG NFR BLD AUTO: 58 % (ref 43–75)
NITRITE UR QL STRIP: NEGATIVE
NON-SQ EPI CELLS URNS QL MICRO: ABNORMAL /HPF
NRBC BLD AUTO-RTO: 0 /100 WBCS
P AXIS: 45 DEGREES
PH UR STRIP.AUTO: 6.5 [PH]
PLATELET # BLD AUTO: 251 THOUSANDS/UL (ref 149–390)
PMV BLD AUTO: 10 FL (ref 8.9–12.7)
POTASSIUM SERPL-SCNC: 3.9 MMOL/L (ref 3.5–5.3)
PR INTERVAL: 146 MS
PROT SERPL-MCNC: 6.9 G/DL (ref 6.4–8.4)
PROT UR STRIP-MCNC: ABNORMAL MG/DL
QRS AXIS: 27 DEGREES
QRSD INTERVAL: 92 MS
QT INTERVAL: 408 MS
QTC INTERVAL: 446 MS
RBC # BLD AUTO: 5.01 MILLION/UL (ref 3.88–5.62)
RBC #/AREA URNS AUTO: ABNORMAL /HPF
SODIUM SERPL-SCNC: 134 MMOL/L (ref 135–147)
SP GR UR STRIP.AUTO: <1.005 (ref 1–1.03)
T WAVE AXIS: 76 DEGREES
UROBILINOGEN UR STRIP-ACNC: <2 MG/DL
VENTRICULAR RATE: 72 BPM
WBC # BLD AUTO: 9.89 THOUSAND/UL (ref 4.31–10.16)
WBC #/AREA URNS AUTO: ABNORMAL /HPF

## 2023-01-12 RX ORDER — INSULIN LISPRO 100 [IU]/ML
1-5 INJECTION, SOLUTION INTRAVENOUS; SUBCUTANEOUS
Status: DISCONTINUED | OUTPATIENT
Start: 2023-01-12 | End: 2023-01-13 | Stop reason: HOSPADM

## 2023-01-12 RX ORDER — CALCIUM CARBONATE 200(500)MG
1000 TABLET,CHEWABLE ORAL DAILY PRN
Status: DISCONTINUED | OUTPATIENT
Start: 2023-01-12 | End: 2023-01-13 | Stop reason: HOSPADM

## 2023-01-12 RX ORDER — ASPIRIN 325 MG
325 TABLET ORAL DAILY
Status: DISCONTINUED | OUTPATIENT
Start: 2023-01-13 | End: 2023-01-13

## 2023-01-12 RX ORDER — METOPROLOL SUCCINATE 50 MG/1
100 TABLET, EXTENDED RELEASE ORAL DAILY
Status: DISCONTINUED | OUTPATIENT
Start: 2023-01-13 | End: 2023-01-13 | Stop reason: HOSPADM

## 2023-01-12 RX ORDER — TAMSULOSIN HYDROCHLORIDE 0.4 MG/1
0.4 CAPSULE ORAL
COMMUNITY

## 2023-01-12 RX ORDER — AMLODIPINE BESYLATE 2.5 MG/1
2.5 TABLET ORAL DAILY
Status: DISCONTINUED | OUTPATIENT
Start: 2023-01-13 | End: 2023-01-13 | Stop reason: HOSPADM

## 2023-01-12 RX ORDER — AMLODIPINE BESYLATE 2.5 MG/1
2.5 TABLET ORAL DAILY
COMMUNITY

## 2023-01-12 RX ORDER — ASPIRIN 325 MG
325 TABLET ORAL DAILY
COMMUNITY
End: 2023-01-13

## 2023-01-12 RX ORDER — SENNOSIDES 8.6 MG
1 TABLET ORAL
Status: DISCONTINUED | OUTPATIENT
Start: 2023-01-12 | End: 2023-01-13 | Stop reason: HOSPADM

## 2023-01-12 RX ORDER — LANOLIN ALCOHOL/MO/W.PET/CERES
6 CREAM (GRAM) TOPICAL
Status: DISCONTINUED | OUTPATIENT
Start: 2023-01-12 | End: 2023-01-13 | Stop reason: HOSPADM

## 2023-01-12 RX ORDER — CLOPIDOGREL BISULFATE 75 MG/1
75 TABLET ORAL DAILY
Status: DISCONTINUED | OUTPATIENT
Start: 2023-01-13 | End: 2023-01-13 | Stop reason: HOSPADM

## 2023-01-12 RX ORDER — DONEPEZIL HYDROCHLORIDE 5 MG/1
5 TABLET, FILM COATED ORAL 2 TIMES DAILY
Status: DISCONTINUED | OUTPATIENT
Start: 2023-01-13 | End: 2023-01-13 | Stop reason: HOSPADM

## 2023-01-12 RX ORDER — ATORVASTATIN CALCIUM 40 MG/1
80 TABLET, FILM COATED ORAL
Status: DISCONTINUED | OUTPATIENT
Start: 2023-01-13 | End: 2023-01-13 | Stop reason: HOSPADM

## 2023-01-12 RX ORDER — TAMSULOSIN HYDROCHLORIDE 0.4 MG/1
0.4 CAPSULE ORAL
Status: DISCONTINUED | OUTPATIENT
Start: 2023-01-13 | End: 2023-01-13 | Stop reason: HOSPADM

## 2023-01-12 RX ORDER — PANTOPRAZOLE SODIUM 20 MG/1
20 TABLET, DELAYED RELEASE ORAL
Status: DISCONTINUED | OUTPATIENT
Start: 2023-01-13 | End: 2023-01-13 | Stop reason: HOSPADM

## 2023-01-12 RX ORDER — ACETAMINOPHEN 325 MG/1
650 TABLET ORAL EVERY 6 HOURS PRN
Status: DISCONTINUED | OUTPATIENT
Start: 2023-01-12 | End: 2023-01-13 | Stop reason: HOSPADM

## 2023-01-12 RX ORDER — CITALOPRAM 20 MG/1
20 TABLET ORAL DAILY
Status: DISCONTINUED | OUTPATIENT
Start: 2023-01-13 | End: 2023-01-13 | Stop reason: HOSPADM

## 2023-01-12 RX ORDER — ONDANSETRON 2 MG/ML
4 INJECTION INTRAMUSCULAR; INTRAVENOUS EVERY 6 HOURS PRN
Status: DISCONTINUED | OUTPATIENT
Start: 2023-01-12 | End: 2023-01-13 | Stop reason: HOSPADM

## 2023-01-12 RX ORDER — INSULIN LISPRO 100 [IU]/ML
1-6 INJECTION, SOLUTION INTRAVENOUS; SUBCUTANEOUS
Status: DISCONTINUED | OUTPATIENT
Start: 2023-01-13 | End: 2023-01-13 | Stop reason: HOSPADM

## 2023-01-12 RX ORDER — ENOXAPARIN SODIUM 100 MG/ML
40 INJECTION SUBCUTANEOUS DAILY
Status: DISCONTINUED | OUTPATIENT
Start: 2023-01-13 | End: 2023-01-13 | Stop reason: HOSPADM

## 2023-01-12 RX ADMIN — IOHEXOL 85 ML: 350 INJECTION, SOLUTION INTRAVENOUS at 19:00

## 2023-01-12 NOTE — ED PROVIDER NOTES
History  Chief Complaint   Patient presents with   • Gait Problem     Patient presents to the ED with c/o being off balanced and mental "fogginess" since yesterday  States he had a stroke 4 years ago  During triage, patient has equal , negative for facial droop, negative for slurred speech, negative for drift       This 69-year-old male has history of a right MCA stroke status post thrombectomy in 2019 with residual left-sided deficits  He also has some decrease sensation of the left lower extremity due to spinal surgeries  Patient has history of diabetes type 2, hypertension and dyslipidemia  He complains of feeling like he "is in a fog" since waking up yesterday morning  He would find himself forgetting why he was going to certain rooms  Apparently forgot to take his medications yesterday as well  He says he is feeling better today but his wife states that he still has more confusion than usual, slightly worse ambulatory function and also has been dropping things today  He does have residual left sided weakness and he has right knee pain so his gait is always off  Prior to Admission Medications   Prescriptions Last Dose Informant Patient Reported? Taking?    Melatonin 10 MG TABS 1/11/2023  Yes Yes   Sig: Take 5 mg by mouth as needed Takes 2 tabs as needed   TOPROL  MG 24 hr tablet 1/12/2023  Yes Yes   Sig: Take 100 mg by mouth daily    amLODIPine (NORVASC) 2 5 mg tablet 1/12/2023  Yes Yes   Sig: Take 2 5 mg by mouth daily   aspirin 325 mg tablet 1/12/2023  Yes Yes   Sig: Take 325 mg by mouth daily   atorvastatin (LIPITOR) 40 mg tablet 1/12/2023  Yes Yes   Sig: Take 80 mg by mouth daily   citalopram (CeleXA) 10 mg tablet 1/12/2023  Yes Yes   Sig: Take 20 mg by mouth daily   clopidogrel (PLAVIX) 75 mg tablet 1/12/2023  No Yes   Sig: Take 1 tablet (75 mg total) by mouth daily   Patient taking differently: Take 80 mg by mouth daily   donepezil (ARICEPT) 5 mg tablet 1/12/2023  No Yes   Sig: TAKE 1 TABLET TWICE A DAY   fluticasone (FLONASE) 50 mcg/act nasal spray Past Week  No Yes   Si spray into each nostril daily   metFORMIN (GLUCOPHAGE) 500 mg tablet 2023  No Yes   Sig: Take 1 tablet (500 mg total) by mouth daily with breakfast   omeprazole (PriLOSEC OTC) 20 MG tablet 2023  Yes Yes   Sig: Take 20 mg by mouth daily   sildenafil (REVATIO) 20 mg tablet   Yes No   Sig: TAKE 2 3 TABLETS BY MOUTH AS NEEDED FOR SEXUAL ACTIVITY   tamsulosin (FLOMAX) 0 4 mg 2023  Yes Yes   Sig: Take 0 4 mg by mouth daily with dinner      Facility-Administered Medications: None       Past Medical History:   Diagnosis Date   • Diabetes mellitus ([facility] [address] )    • Dyslipidemia 3/26/2019   • Hypertension    • Stroke [facility])        Past Surgical History:   Procedure Laterality Date   • BACK SURGERY     • IR STROKE ALERT  3/19/2019   • SHOULDER SURGERY         Family History   Problem Relation Age of Onset   • Heart attack Mother    • Diabetes Mother    • Prostate cancer Father      I have reviewed and agree with the history as documented  E-Cigarette/Vaping   • E-Cigarette Use Current Some Day User      E-Cigarette/Vaping Substances   • Nicotine No    • THC No    • CBD No    • Flavoring No    • Other No    • Unknown No      Social History     Tobacco Use   • Smoking status: Former   • Smokeless tobacco: Never   Vaping Use   • Vaping Use: Some days   Substance Use Topics   • Alcohol use: Not Currently   • Drug use: Never       Review of Systems   Constitutional: Negative for fever  Cardiovascular: Negative for chest pain and palpitations  Musculoskeletal: Positive for arthralgias, gait problem and myalgias  Neurological: Positive for weakness and numbness  Negative for dizziness, syncope and headaches  All other systems reviewed and are negative  Physical Exam  Physical Exam  Vitals and nursing note reviewed  Constitutional:       General: He is not in acute distress       Appearance: He is well-developed and normal weight  He is not ill-appearing or diaphoretic  HENT:      Head: Normocephalic and atraumatic  Right Ear: Tympanic membrane, ear canal and external ear normal       Left Ear: Tympanic membrane, ear canal and external ear normal       Nose: Nose normal       Mouth/Throat:      Mouth: Mucous membranes are moist       Pharynx: Oropharynx is clear  Eyes:      General: No scleral icterus  Conjunctiva/sclera: Conjunctivae normal       Pupils: Pupils are equal, round, and reactive to light  Neck:      Vascular: No carotid bruit or JVD  Cardiovascular:      Rate and Rhythm: Normal rate and regular rhythm  Pulses: Normal pulses  Heart sounds: Normal heart sounds  No murmur heard  Pulmonary:      Effort: Pulmonary effort is normal       Breath sounds: Normal breath sounds  Abdominal:      General: Bowel sounds are normal       Palpations: Abdomen is soft  There is no mass  Tenderness: There is no abdominal tenderness  There is no guarding or rebound  Musculoskeletal:         General: No swelling, tenderness or deformity  Cervical back: Normal range of motion and neck supple  No tenderness  Right lower leg: No edema  Left lower leg: No edema  Comments: Retriction in abduction, flexion of left shoulder d/t remote rotator cuff injury   Lymphadenopathy:      Cervical: No cervical adenopathy  Skin:     General: Skin is warm and dry  Capillary Refill: Capillary refill takes less than 2 seconds  Findings: No rash  Neurological:      Mental Status: He is alert and oriented to person, place, and time  Mental status is at baseline  Cranial Nerves: No cranial nerve deficit  Sensory: Sensory deficit ( diminished  pin prick sensation left leg; extintion of touch sensation of LUE and LLE) present  Motor: No weakness  Coordination: Coordination normal       Deep Tendon Reflexes: Reflexes are normal and symmetric     Psychiatric:         Mood and Affect: Mood normal          Behavior: Behavior normal          Vital Signs  ED Triage Vitals   Temperature Pulse Respirations Blood Pressure SpO2   01/12/23 1553 01/12/23 1551 01/12/23 1551 01/12/23 1551 01/12/23 1551   98 4 °F (36 9 °C) 79 18 (!) 196/88 98 %      Temp Source Heart Rate Source Patient Position - Orthostatic VS BP Location FiO2 (%)   01/12/23 1553 01/12/23 1551 01/12/23 1551 01/12/23 1551 --   Temporal Monitor Sitting Left arm       Pain Score       01/12/23 1551       No Pain           Vitals:    01/12/23 2045 01/12/23 2115 01/12/23 2145 01/13/23 0742   BP: (!) 183/79 135/93 167/81 170/76   Pulse: 63 80 87 60   Patient Position - Orthostatic VS: Sitting Sitting Sitting Sitting         Visual Acuity  Visual Acuity    Flowsheet Row Most Recent Value   L Pupil Size (mm) 4   R Pupil Size (mm) 4   L Pupil Shape Round   R Pupil Shape Round          ED Medications  Medications   iohexol (OMNIPAQUE) 350 MG/ML injection (SINGLE-DOSE) 100 mL (85 mL Intravenous Given 1/12/23 1900)       Diagnostic Studies  Results Reviewed     Procedure Component Value Units Date/Time    Urine culture [[id number]] Collected: 01/12/23 1923    Lab Status: Final result Specimen: Urine, Clean Catch Updated: 01/14/23 0708     Urine Culture No Growth <1000 cfu/mL    RPR [[id number]]  (Normal) Collected: 01/13/23 0458    Lab Status: Final result Specimen: Blood from Arm, Right Updated: 01/13/23 1414     RPR Non-Reactive    Vitamin B12 [[id number]]  (Normal) Collected: 01/13/23 0458    Lab Status: Final result Specimen: Blood from Arm, Right Updated: 01/13/23 0732     Vitamin B-12 686 pg/mL     Folate [[id number]]  (Normal) Collected: 01/13/23 0458    Lab Status: Final result Specimen: Blood from Arm, Right Updated: 01/13/23 0732     Folate 13 3 ng/mL     Hemoglobin A1C w/ EAG Estimation [[id number]]  (Abnormal) Collected: 01/13/23 0458    Lab Status: Final result Specimen: Blood from Arm, Right Updated: 01/13/23 0729     Hemoglobin A1C 5 8 %       mg/dl     Basic metabolic panel [[id number]] Collected: 01/13/23 0458    Lab Status: Final result Specimen: Blood from Arm, Right Updated: 01/13/23 0550     Sodium 139 mmol/L      Potassium 4 4 mmol/L      Chloride 107 mmol/L      CO2 24 mmol/L      ANION GAP 8 mmol/L      BUN 18 mg/dL      Creatinine 0 85 mg/dL      Glucose 99 mg/dL      Calcium 9 0 mg/dL      eGFR 92 ml/min/1 73sq m     Narrative:      [place] guidelines for Chronic Kidney Disease (CKD):   •  Stage 1 with normal or high GFR (GFR > 90 mL/min/1 73 square meters)  •  Stage 2 Mild CKD (GFR = 60-89 mL/min/1 73 square meters)  •  Stage 3A Moderate CKD (GFR = 45-59 mL/min/1 73 square meters)  •  Stage 3B Moderate CKD (GFR = 30-44 mL/min/1 73 square meters)  •  Stage 4 Severe CKD (GFR = 15-29 mL/min/1 73 square meters)  •  Stage 5 End Stage CKD (GFR <15 mL/min/1 73 square meters)  Note: GFR calculation is accurate only with a steady state creatinine    TSH, 3rd generation with Free T4 reflex [[id number]]  (Normal) Collected: 01/13/23 0458    Lab Status: Final result Specimen: Blood from Arm, Right Updated: 01/13/23 0550     TSH 3RD GENERATON 3 437 uIU/mL     Narrative:      Patients undergoing fluorescein dye angiography may retain small amounts of fluorescein in the body for 48-72 hours post procedure  Samples containing fluorescein can produce falsely depressed TSH values  If the patient had this procedure,a specimen should be resubmitted post fluorescein clearance        HS Troponin I 4hr [[id number]]  (Normal) Collected: 01/13/23 0458    Lab Status: Final result Specimen: Blood from Arm, Right Updated: 01/13/23 0546     hs TnI 4hr 12 ng/L      Delta 4hr hsTnI -1 ng/L     Ammonia [[id number]]  (Normal) Collected: 01/13/23 0458    Lab Status: Final result Specimen: Blood from Arm, Right Updated: 01/13/23 0535     Ammonia 26 umol/L     CBC (With Platelets) [[id number]]  (Abnormal) Collected: 01/13/23 0458 Lab Status: Final result Specimen: Blood from Arm, Right Updated: 01/13/23 0519     WBC 11 21 Thousand/uL      RBC 5 07 Million/uL      Hemoglobin 16 2 g/dL      Hematocrit 48 5 %      MCV 96 fL      MCH 32 0 pg      MCHC 33 4 g/dL      RDW 13 1 %      Platelets 485 Thousands/uL      MPV 10 1 fL     Methylmalonic acid, serum [[id number]] Collected: 01/13/23 0458    Lab Status: In process Specimen: Blood from Arm, Right Updated: 01/13/23 0519    Vitamin B1, whole blood [[id number]] Collected: 01/13/23 0458    Lab Status: In process Specimen: Blood from Arm, Right Updated: 01/13/23 0519    COVID/FLU/RSV [[id number]]  (Normal) Collected: 01/12/23 2348    Lab Status: Final result Specimen: Nares from Nose Updated: 01/13/23 0035     SARS-CoV-2 Negative     INFLUENZA A PCR Negative     INFLUENZA B PCR Negative     RSV PCR Negative    Narrative:      FOR PEDIATRIC PATIENTS - copy/paste COVID Guidelines URL to browser: [email or web address]  [employer]    SARS-CoV-2 assay is a Nucleic Acid Amplification assay intended for the  qualitative detection of nucleic acid from SARS-CoV-2 in nasopharyngeal  swabs  Results are for the presumptive identification of SARS-CoV-2 RNA  Positive results are indicative of infection with SARS-CoV-2, the virus  causing COVID-19, but do not rule out bacterial infection or co-infection  with other viruses  Laboratories within the [place] and its  territories are required to report all positive results to the appropriate  public health authorities  Negative results do not preclude SARS-CoV-2  infection and should not be used as the sole basis for treatment or other  patient management decisions  Negative results must be combined with  clinical observations, patient history, and epidemiological information  This test has not been FDA cleared or approved  This test has been authorized by FDA under an Emergency Use Authorization  (EUA)   This test is only authorized for the duration of time the  declaration that circumstances exist justifying the authorization of the  emergency use of an in vitro diagnostic tests for detection of SARS-CoV-2  virus and/or diagnosis of COVID-19 infection under section 564(b)(1) of  the Act, 21 U  [place] C  777IJN-3(P)(0), unless the authorization is terminated  or revoked sooner  The test has been validated but independent review by FDA  and CLIA is pending  Test performed using [employer]: This RT-PCR assay targets N2,  a region unique to SARS-CoV-2  A conserved region in the E-gene was chosen  for pan-Sarbecovirus detection which includes SARS-CoV-2  According to CMS-2020-01-R, this platform meets the definition of high-throughput technology      HS Troponin 0hr (reflex protocol) [[id number]]  (Normal) Collected: 01/12/23 1656    Lab Status: Final result Specimen: Blood from Arm, Right Updated: 01/12/23 2231     hs TnI 0hr 13 ng/L     Urinalysis with microscopic [[id number]]  (Abnormal) Collected: 01/12/23 1923    Lab Status: Final result Specimen: Urine, Clean Catch Updated: 01/12/23 1957     Color, UA Yellow     Clarity, UA Clear     Specific Gravity, UA <1 005     pH, UA 6 5     Leukocytes, UA Negative     Nitrite, UA Negative     Protein, UA 30 (1+) mg/dl      Glucose, UA 70 (7/100%) mg/dl      Ketones, UA Negative mg/dl      Urobilinogen, UA <2 0 mg/dl      Bilirubin, UA Negative     Occult Blood, UA Negative     RBC, UA 0-1 /hpf      WBC, UA 0-1 /hpf      Epithelial Cells Occasional /hpf      Bacteria, UA None Seen /hpf     CBC and differential [[id number]] Collected: 01/12/23 1656    Lab Status: Final result Specimen: Blood from Arm, Right Updated: 01/12/23 1742     WBC 9 89 Thousand/uL      RBC 5 01 Million/uL      Hemoglobin 15 9 g/dL      Hematocrit 47 8 %      MCV 95 fL      MCH 31 7 pg      MCHC 33 3 g/dL      RDW 12 9 %      MPV 10 0 fL      Platelets 714 Thousands/uL      nRBC 0 /100 WBCs      Neutrophils Relative 58 %      Immat GRANS % 0 %      Lymphocytes Relative 29 %      Monocytes Relative 10 %      Eosinophils Relative 2 %      Basophils Relative 1 %      Neutrophils Absolute 5 70 Thousands/µL      Immature Grans Absolute 0 03 Thousand/uL      Lymphocytes Absolute 2 88 Thousands/µL      Monocytes Absolute 0 97 Thousand/µL      Eosinophils Absolute 0 23 Thousand/µL      Basophils Absolute 0 08 Thousands/µL     Comprehensive metabolic panel [[id number]]  (Abnormal) Collected: 01/12/23 1656    Lab Status: Final result Specimen: Blood from Arm, Right Updated: 01/12/23 6147     Sodium 134 mmol/L      Potassium 3 9 mmol/L      Chloride 105 mmol/L      CO2 26 mmol/L      ANION GAP 3 mmol/L      BUN 19 mg/dL      Creatinine 0 92 mg/dL      Glucose 158 mg/dL      Calcium 8 9 mg/dL      AST 21 U/L      ALT 34 U/L      Alkaline Phosphatase 108 U/L      Total Protein 6 9 g/dL      Albumin 3 6 g/dL      Total Bilirubin 0 30 mg/dL      eGFR 88 ml/min/1 73sq m     Narrative:      [place] guidelines for Chronic Kidney Disease (CKD):   •  Stage 1 with normal or high GFR (GFR > 90 mL/min/1 73 square meters)  •  Stage 2 Mild CKD (GFR = 60-89 mL/min/1 73 square meters)  •  Stage 3A Moderate CKD (GFR = 45-59 mL/min/1 73 square meters)  •  Stage 3B Moderate CKD (GFR = 30-44 mL/min/1 73 square meters)  •  Stage 4 Severe CKD (GFR = 15-29 mL/min/1 73 square meters)  •  Stage 5 End Stage CKD (GFR <15 mL/min/1 73 square meters)  Note: GFR calculation is accurate only with a steady state creatinine                 MRI brain wo contrast   Final Result by Chucho Quintana MD (01/13 0901)      No acute intracranial abnormality  Multiple unchanged chronic small infarcts in right frontoparietal corona radiata, left periatrial white matter, and left cerebellum with severe chronic microangiopathy        Workstation performed: [id number]         CTA head and neck with and without contrast   Final Result by Mitzi Becerra MD (01/12 2012)         1  Stable severe stenosis in the distal right M1 segment  No evidence of acute thrombus or large vessel occlusion within the major vessels of the [place]  2   Significant atherosclerotic plaque in the proximal internal carotid arteries without hemodynamically significant stenosis  Workstation performed: [id number]         XR chest 1 view portable   Final Result by Bebeto Triana MD (01/12 1749)      No acute cardiopulmonary disease  Workstation performed: [id number]                    Procedures  ECG 12 Lead Documentation Only    Date/Time: 1/12/2023 4:45 PM  Performed by: Ulysses Reiter, DO  Authorized by: Ulysses Reiter, DO     ECG reviewed by me, the ED Provider: yes    Patient location:  ED  Previous ECG:     Previous ECG:  Unavailable    Comparison to cardiac monitor: Yes    Interpretation:     Interpretation: normal               ED Course  ED Course as of 01/14/23 1209   Thu Jan 12, 2023 1947 Case discussed with neurology, Dr Rianna Carlisle, at 4:46 PM   He said he will come them to see the patient  I have not seen that neurologist said have not seen any notes by him  I texted the on-call stroke neurologist, Dr Becca Coleman Name 01/12/23 1643             NIH Stroke Scale    Interval Baseline      Level of Consciousness (1a ) 0      LOC Questions (1b ) 0      LOC Commands (1c ) 0      Best Gaze (2 ) 0      Visual (3 ) 0      Facial Palsy (4 ) 0      Motor Arm, Left (5a ) 0      Motor Arm, Right (5b ) 0      Motor Leg, Left (6a ) 0      Motor Leg, Right (6b ) 0      Limb Ataxia (7 ) 0      Sensory (8 ) 1      Best Language (9 ) 0      Dysarthria (10 ) 0      Extinction and Inattention (11 ) (Formerly Neglect) 1      Total 2              Flowsheet Row Most Recent Value   Thrombolytic Decision Options    Thrombolytic Decision Patient not a candidate     Patient is not a candidate options Unclear time of onset outside appropriate time window , Symptoms resolved/clearly non disabling  Medical Decision Making  61 yo M with prior right MCA stroke and mild residual left-sided weakness presents with encephalopathy since yesterday  Did not take morning medications yesterday  Feels better but wife states he is still not at baseline  No criteria for stroke alert nor thrombolytic at this time  Will do stroke/encephalopathy w/u  Neurology consulted  History of stroke: self-limited or minor problem  Hypertensive encephalopathy: self-limited or minor problem  Amount and/or Complexity of Data Reviewed  Labs: ordered  Radiology: ordered  Risk  Prescription drug management  Decision regarding hospitalization  Disposition  Final diagnoses:   History of stroke   Hypertensive encephalopathy     Time reflects when diagnosis was documented in both MDM as applicable and the Disposition within this note     Time User Action Codes Description Comment    1/12/2023  4:56 PM [place] Add [Z86 73] History of stroke     1/12/2023  8:51 PM [place] Add [I67 4] Hypertensive encephalopathy     1/12/2023 10:38 PM Elizebeth Blunt Add [R41 3] Memory loss     1/13/2023  9:58 AM John Stage Add [R41 0] Transient confusion     1/13/2023 10:44 AM JamesnaChau mejia Add [I65 21] Stenosis of right carotid artery     1/13/2023 10:44 AM ElnaggChau bermudez Add [I63 511] Right middle cerebral artery stroke ([facility] [address] )     1/13/2023 10:50 AM ElnaggChau bermudez Add [I67 4] Hypertensive encephalopathy, transient       ED Disposition     ED Disposition   Admit    Condition   Stable    Date/Time   Thu Jan 12, 2023  8:51 PM    Comment   Case was discussed with [facility] AP and the patient's admission status was agreed to be Admission Status: observation status to the service of Dr Jo Mcfarlane              Follow-up Information     Follow up With Specialties Details Why Contact Info Additional [address], [address] Nurse Practitioner Follow up  [place] [address] [facility] Neurology Follow up  [address] [facility] [address] [facility] Neurology [id number], [address], [address], [place], [place], [facility]    [address] [facility] Cardiology Follow up  [address] [place] [id number]  [address] Cardiology [address], [address] [place], [place], [place], [facility] [place]          Discharge Medication List as of 1/13/2023 11:02 AM      CONTINUE these medications which have NOT CHANGED    Details   amLODIPine (NORVASC) 2 5 mg tablet Take 2 5 mg by mouth daily, Historical Med      atorvastatin (LIPITOR) 40 mg tablet Take 80 mg by mouth daily, Historical Med      citalopram (CeleXA) 10 mg tablet Take 20 mg by mouth daily, Historical Med      clopidogrel (PLAVIX) 75 mg tablet Take 1 tablet (75 mg total) by mouth daily, Starting Sat 4/6/2019, Print      donepezil (ARICEPT) 5 mg tablet TAKE 1 TABLET TWICE A DAY, Normal      fluticasone (FLONASE) 50 mcg/act nasal spray 1 spray into each nostril daily, Starting Sat 4/6/2019, Print      Melatonin 10 MG TABS Take 5 mg by mouth as needed Takes 2 tabs as needed, Historical Med      metFORMIN (GLUCOPHAGE) 500 mg tablet Take 1 tablet (500 mg total) by mouth daily with breakfast, Starting Sat 4/6/2019, Print      omeprazole (PriLOSEC OTC) 20 MG tablet Take 20 mg by mouth daily, Historical Med      tamsulosin (FLOMAX) 0 4 mg Take 0 4 mg by mouth daily with dinner, Historical Med      TOPROL  MG 24 hr tablet Take 100 mg by mouth daily , Starting Wed 7/31/2019, Historical Med      sildenafil (REVATIO) 20 mg tablet TAKE 2 3 TABLETS BY MOUTH AS NEEDED FOR SEXUAL ACTIVITY, Historical Med         STOP taking these medications       aspirin 325 mg tablet Comments:   Reason for Stopping:               Outpatient Discharge Orders   Ambulatory referral to Cardiology   Standing Status: Future Standing Exp  Date: 01/13/24      Ambulatory referral to Neurology   Standing Status: Future Standing Exp  Date: 01/13/24      Discharge Diet     Activity as tolerated     EEG Routine and awake   Standing Status: Future Standing Exp   Date: 01/13/24       PDMP Review       Value Time User    PDMP Reviewed  Yes 1/13/2023 10:47 AM William Mix MD          ED Provider  Electronically Signed by           Rakesh Goyal DO  01/14/23 5995

## 2023-01-13 VITALS
WEIGHT: 216.71 LBS | DIASTOLIC BLOOD PRESSURE: 76 MMHG | HEART RATE: 60 BPM | SYSTOLIC BLOOD PRESSURE: 170 MMHG | OXYGEN SATURATION: 95 % | BODY MASS INDEX: 34.83 KG/M2 | HEIGHT: 66 IN | TEMPERATURE: 98.4 F | RESPIRATION RATE: 16 BRPM

## 2023-01-13 PROBLEM — I67.4 HYPERTENSIVE ENCEPHALOPATHY: Status: ACTIVE | Noted: 2023-01-12

## 2023-01-13 LAB
4HR DELTA HS TROPONIN: -1 NG/L
AMMONIA PLAS-SCNC: 26 UMOL/L (ref 11–35)
ANION GAP SERPL CALCULATED.3IONS-SCNC: 8 MMOL/L (ref 4–13)
BUN SERPL-MCNC: 18 MG/DL (ref 5–25)
CALCIUM SERPL-MCNC: 9 MG/DL (ref 8.3–10.1)
CARDIAC TROPONIN I PNL SERPL HS: 12 NG/L
CHLORIDE SERPL-SCNC: 107 MMOL/L (ref 96–108)
CHOLEST SERPL-MCNC: 128 MG/DL
CO2 SERPL-SCNC: 24 MMOL/L (ref 21–32)
CREAT SERPL-MCNC: 0.85 MG/DL (ref 0.6–1.3)
ERYTHROCYTE [DISTWIDTH] IN BLOOD BY AUTOMATED COUNT: 13.1 % (ref 11.6–15.1)
EST. AVERAGE GLUCOSE BLD GHB EST-MCNC: 120 MG/DL
FLUAV RNA RESP QL NAA+PROBE: NEGATIVE
FLUBV RNA RESP QL NAA+PROBE: NEGATIVE
FOLATE SERPL-MCNC: 13.3 NG/ML (ref 3.1–17.5)
GFR SERPL CREATININE-BSD FRML MDRD: 92 ML/MIN/1.73SQ M
GLUCOSE SERPL-MCNC: 200 MG/DL (ref 65–140)
GLUCOSE SERPL-MCNC: 97 MG/DL (ref 65–140)
GLUCOSE SERPL-MCNC: 99 MG/DL (ref 65–140)
HBA1C MFR BLD: 5.8 %
HCT VFR BLD AUTO: 48.5 % (ref 36.5–49.3)
HDLC SERPL-MCNC: 35 MG/DL
HGB BLD-MCNC: 16.2 G/DL (ref 12–17)
LDLC SERPL CALC-MCNC: 44 MG/DL (ref 0–100)
MCH RBC QN AUTO: 32 PG (ref 26.8–34.3)
MCHC RBC AUTO-ENTMCNC: 33.4 G/DL (ref 31.4–37.4)
MCV RBC AUTO: 96 FL (ref 82–98)
PLATELET # BLD AUTO: 253 THOUSANDS/UL (ref 149–390)
PMV BLD AUTO: 10.1 FL (ref 8.9–12.7)
POTASSIUM SERPL-SCNC: 4.4 MMOL/L (ref 3.5–5.3)
RBC # BLD AUTO: 5.07 MILLION/UL (ref 3.88–5.62)
RPR SER QL: NORMAL
RSV RNA RESP QL NAA+PROBE: NEGATIVE
SARS-COV-2 RNA RESP QL NAA+PROBE: NEGATIVE
SODIUM SERPL-SCNC: 139 MMOL/L (ref 135–147)
TRIGL SERPL-MCNC: 246 MG/DL
TSH SERPL DL<=0.05 MIU/L-ACNC: 3.44 UIU/ML (ref 0.45–4.5)
VIT B12 SERPL-MCNC: 686 PG/ML (ref 100–900)
WBC # BLD AUTO: 11.21 THOUSAND/UL (ref 4.31–10.16)

## 2023-01-13 RX ADMIN — AMLODIPINE BESYLATE 2.5 MG: 2.5 TABLET ORAL at 08:01

## 2023-01-13 RX ADMIN — DONEPEZIL HYDROCHLORIDE 5 MG: 5 TABLET, FILM COATED ORAL at 08:00

## 2023-01-13 RX ADMIN — CITALOPRAM HYDROBROMIDE 20 MG: 20 TABLET ORAL at 08:00

## 2023-01-13 RX ADMIN — METOPROLOL SUCCINATE 100 MG: 50 TABLET, EXTENDED RELEASE ORAL at 08:00

## 2023-01-13 RX ADMIN — PANTOPRAZOLE SODIUM 20 MG: 20 TABLET, DELAYED RELEASE ORAL at 06:18

## 2023-01-13 RX ADMIN — CLOPIDOGREL BISULFATE 75 MG: 75 TABLET ORAL at 08:01

## 2023-01-13 RX ADMIN — ASPIRIN 325 MG ORAL TABLET 325 MG: 325 PILL ORAL at 08:00

## 2023-01-13 RX ADMIN — ENOXAPARIN SODIUM 40 MG: 100 INJECTION SUBCUTANEOUS at 08:04

## 2023-01-13 NOTE — H&P
[place]  [facility]&P- [facility] 1960, 60 [facility]  male MRN: [id number]  Unit/Bed#: ERNESTO Encounter: [id number]  Primary Care Provider: ERIC Barger   Date and time admitted to hospital: 1/12/2023  4:04 PM    * Encephalopathy  Assessment & Plan  · Confusion noted this morning at 0700 by wife, described as not answering question and mumbling his words, but not slurring his words necessarily -followed between [id number]  · Patient reports during this time he felt tired and simply did not feel like talking, denies any receptive or expressive aphasia  · CTA head/neck: "Stable severe stenosis in the distal right M1 segment  No evidence of acute thrombus or large vessel occlusion within the major vessels of the [place] "  · ED spoke with neurology, who recommended blood pressure control with goal SBP less than 160 and MRI brain  · Neurology consult  · MRI brain ordered  · Neurochecks every 2 hours with vital signs  · Monitor on telemetry   · Initial blood pressure in the /88 -improved to 135/93 without intervention  · On admission, NIHSS is 0  He does have 4/5  strength in LUE that is baseline per wife and patient, however no drift to b/l upper or lower extremities  Memory difficulties  Assessment & Plan  · Ongoing since after his stroke in 2019  · Maintained on Aricept 5 mg twice daily  · Patient endorses significant short-term memory difficulties yesterday, which fluctuates daily  · Continue Aricept    Type 2 diabetes mellitus, without long-term current use of insulin (HCC)  Assessment & Plan  Lab Results   Component Value Date    HGBA1C 5 7 03/19/2019       No results for input(s): POCGLU in the last 72 hours      Blood Sugar Average: Last 72 hrs:  · Home regimen: Metformin 500 mg daily  · Recheck hemoglobin A1c  · Hold oral hypoglycemics and place on SSI    Hypertension  Assessment & Plan  · Home regimen: Toprol- mg daily and amlodipine 2 5 mg daily  · Blood pressure initially elevated on arrival to the ED, however improved without medical intervention    Hypertriglyceridemia  Assessment & Plan  · Home regimen: Lipitor 80 mg daily  · Continue    Chronic ischemic right MCA stroke  Assessment & Plan  · Status post right MCA stroke in 2019 status post thrombectomy  · Maintained on dual antiplatelet therapy with full dose aspirin and Plavix 75 mg daily  · Continue aspirin, Plavix, and statin    VTE Pharmacologic Prophylaxis: VTE Score: 8 High Risk (Score >/= 5) - Pharmacological DVT Prophylaxis Ordered: enoxaparin (Lovenox)  Sequential Compression Devices Ordered  Code Status: Level 3 - DNAR and DNI   Discussion with family: Updated  (wife) at bedside  Anticipated Length of Stay: Patient will be admitted on an observation basis with an anticipated length of stay of less than 2 midnights secondary to Encephalopathy, chronic MCA stroke, hypertension, hyperlipidemia, DM2, memory loss  Total Time for Visit, including Counseling / Coordination of Care: 70 minutes Greater than 50% of this total time spent on direct patient counseling and coordination of care  Chief Complaint: " He was very very confused" -Per wife  Patient states "I was very tired"    History of Present Illness:  Zeina Horton is a 60 y o  male with a PMH of history of right MCA stroke status postlumpectomy in 2019, HTN, HLD, NIDDM 2, and memory difficulties on Aricept who presents with his wife for evaluation of confusion that onset this morning at 0700  Wife reports that the patient was sitting in the chair and was not answering her questions and was mumbling  She denies any slurred speech or aphasia, just that the patient would not answer except for mumbling words  There was no focal numbness or weakness noted  Wife did note some increase in unsteadiness with ambulation, however no noted foot drop or leaning to one side    Patient reports during this time he simply felt "very tired and did not feel like talking "  He denies experiencing expressive or receptive aphasia  He does admit to generalized weakness  No headache, numbness, or focal weakness noted by patient  Patient does report increased short-term memory loss yesterday described as not remembering why he went into a room or where he was walking to  Pt returned to baseline mental status around 2408-1708 today, but wife was concerned so he came to the hospital   On admission, patient reports he is back to his baseline  No recent illness  No fever, chills, chest pain, dyspnea, abdominal pain, nausea, vomiting, or urinary symptoms  Admits to some constipation  Review of Systems:  Review of Systems   Constitutional: Negative for chills and fever  HENT: Negative for congestion  Eyes: Negative for visual disturbance  Respiratory: Negative for cough and shortness of breath  Cardiovascular: Negative for chest pain and leg swelling  Gastrointestinal: Positive for constipation  Negative for abdominal pain, diarrhea, nausea and vomiting  Genitourinary: Negative for difficulty urinating, dysuria, frequency and hematuria  Musculoskeletal: Positive for gait problem  Neurological: Positive for speech difficulty (Incoherent speech per wife, however not necessarily slurred speech or aphasia ) and weakness (Generalized, nonfocal)  Negative for dizziness, light-headedness and numbness  Psychiatric/Behavioral: Positive for confusion  All other systems reviewed and are negative  Past Medical and Surgical History:   Past Medical History:   Diagnosis Date   • Diabetes mellitus ([facility] [address] )    • Dyslipidemia 3/26/2019   • Hypertension    • Stroke [facility])        Past Surgical History:   Procedure Laterality Date   • BACK SURGERY     • IR STROKE ALERT  3/19/2019   • SHOULDER SURGERY         Meds/Allergies:  Prior to Admission medications    Medication Sig Start Date End Date Taking?  Authorizing Provider   amLODIPine (NORVASC) 2 5 mg tablet Take 2 5 mg by mouth daily   Yes Historical Provider, MD   aspirin 325 mg tablet Take 325 mg by mouth daily   Yes Historical Provider, MD   atorvastatin (LIPITOR) 40 mg tablet Take 80 mg by mouth daily   Yes Historical Provider, MD   citalopram (CeleXA) 10 mg tablet Take 20 mg by mouth daily   Yes Historical Provider, MD   clopidogrel (PLAVIX) 75 mg tablet Take 1 tablet (75 mg total) by mouth daily  Patient taking differently: Take 80 mg by mouth daily 4/6/19  Yes Manny Mckenna MD   donepezil (ARICEPT) 5 mg tablet TAKE 1 TABLET TWICE A DAY 2/13/22  Yes Elham Blevins MD   fluticasone (FLONASE) 50 mcg/act nasal spray 1 spray into each nostril daily 4/6/19  Yes Manny Mckenna MD   Melatonin 10 MG TABS Take 5 mg by mouth as needed Takes 2 tabs as needed   Yes Historical Provider, MD   metFORMIN (GLUCOPHAGE) 500 mg tablet Take 1 tablet (500 mg total) by mouth daily with breakfast 4/6/19  Yes Manny Mckenna MD   omeprazole (PriLOSEC OTC) 20 MG tablet Take 20 mg by mouth daily   Yes Historical Provider, MD   tamsulosin (FLOMAX) 0 4 mg Take 0 4 mg by mouth daily with dinner   Yes Historical Provider, MD   TOPROL  MG 24 hr tablet Take 100 mg by mouth daily  7/31/19  Yes Historical Provider, MD   ibuprofen (MOTRIN) 600 mg tablet Take 1 tablet (600 mg total) by mouth every 6 (six) hours as needed for mild pain for up to 7 days 4/16/19 1/12/23 Yes Enma Jenkins MD   sildenafil (REVATIO) 20 mg tablet TAKE 2 3 TABLETS BY MOUTH AS NEEDED FOR SEXUAL ACTIVITY 7/12/19   Historical Provider, MD   donepezil (ARICEPT) 10 mg tablet Take 0 5 tablets (5 mg total) by mouth 2 (two) times a day  Patient not taking: Reported on 1/12/2023 3/2/21 1/12/23  Elham Blevins MD     I have reviewed home medications with patient personally  Allergies:    Allergies   Allergen Reactions   • Lisinopril Cough       Social History:  Marital Status: /Civil Union   Occupation: disabled  Patient Pre-hospital Living Situation: Home, With spouse  Patient Pre-hospital Level of Mobility: walks  Patient Pre-hospital Diet Restrictions: none   Substance Use History:   Social History     Substance and Sexual Activity   Alcohol Use Not Currently     Social History     Tobacco Use   Smoking Status Former   Smokeless Tobacco Never     Social History     Substance and Sexual Activity   Drug Use Never       Family History:  Family History   Problem Relation Age of Onset   • Heart attack Mother    • Diabetes Mother    • Prostate cancer Father        Physical Exam:     Vitals:   Blood Pressure: 135/93 (01/12/23 2115)  Pulse: 80 (01/12/23 2115)  Temperature: 98 4 °F (36 9 °C) (01/12/23 1553)  Temp Source: Temporal (01/12/23 1553)  Respirations: 18 (01/12/23 2115)  Height: 5' 6" (167 6 cm) (01/12/23 1551)  Weight - Scale: 98 9 kg (218 lb) (01/12/23 1551)  SpO2: 97 % (01/12/23 2115)    Physical Exam  Vitals and nursing note reviewed  Constitutional:       General: He is not in acute distress  Appearance: Normal appearance  He is not ill-appearing  Comments: Conversational   HENT:      Head: Normocephalic  Nose: Nose normal       Mouth/Throat:      Mouth: Mucous membranes are moist    Eyes:      General: No scleral icterus  Extraocular Movements: Extraocular movements intact  Conjunctiva/sclera: Conjunctivae normal       Pupils: Pupils are equal, round, and reactive to light  Cardiovascular:      Rate and Rhythm: Normal rate and regular rhythm  Pulses: Normal pulses  Heart sounds: No murmur heard  Pulmonary:      Effort: Pulmonary effort is normal       Breath sounds: Normal breath sounds  No wheezing, rhonchi or rales  Abdominal:      General: Abdomen is flat  Palpations: Abdomen is soft  Tenderness: There is no abdominal tenderness  Musculoskeletal:         General: Normal range of motion  Cervical back: Normal range of motion        Right lower leg: No edema  Left lower leg: No edema  Skin:     General: Skin is warm and dry  Coloration: Skin is not pale  Neurological:      Mental Status: He is alert and oriented to person, place, and time  Comments: NIHSS on admission 0  Motor: No drift in bilateral upper or lower extremities  5/5  strength right upper extremity  4/5  strength in left upper extremity (baseline per patient)  Strength in remaining muscle groups of bilateral upper extremities is 5/5   5/5 strength in bilateral lower extremities  Sensory: Sensation is equal and intact in bilateral upper and lower extremities  Coordination: Normal finger-to-nose and heel-to-shin bilaterally  No aphasia or dysarthria  Able to name objects without issue  EOMI, however does have horizontal nystagmus bilaterally with abduction and adduction  PERRLA  Psychiatric:      Comments: Easily agitated          Additional Data:     Lab Results:  Results from last 7 days   Lab Units 01/12/23  1656   WBC Thousand/uL 9 89   HEMOGLOBIN g/dL 15 9   HEMATOCRIT % 47 8   PLATELETS Thousands/uL 251   NEUTROS PCT % 58   LYMPHS PCT % 29   MONOS PCT % 10   EOS PCT % 2     Results from last 7 days   Lab Units 01/12/23  1656   SODIUM mmol/L 134*   POTASSIUM mmol/L 3 9   CHLORIDE mmol/L 105   CO2 mmol/L 26   BUN mg/dL 19   CREATININE mg/dL 0 92   ANION GAP mmol/L 3*   CALCIUM mg/dL 8 9   ALBUMIN g/dL 3 6   TOTAL BILIRUBIN mg/dL 0 30   ALK PHOS U/L 108   ALT U/L 34   AST U/L 21   GLUCOSE RANDOM mg/dL 158*                       Lines/Drains:  Invasive Devices     Peripheral Intravenous Line  Duration           Peripheral IV 01/12/23 Right Antecubital <1 day                    Imaging: Reviewed radiology reports from this admission including: CT head  CTA head and neck with and without contrast   Final Result by Katya Cartagena MD (01/12 2012)         1  Stable severe stenosis in the distal right M1 segment    No evidence of acute thrombus or large vessel occlusion within the major vessels of the [place]  2   Significant atherosclerotic plaque in the proximal internal carotid arteries without hemodynamically significant stenosis  Workstation performed: [id number]         XR chest 1 view portable   Final Result by Juanjose Dalal MD (01/12 1749)      No acute cardiopulmonary disease  Workstation performed: [id number]         MRI brain NeuroQuant wo contrast    (Results Pending)       EKG and Other Studies Reviewed on Admission:   · EKG: NSR with no acute ischemia       ** Please Note: This note has been constructed using a voice recognition system   **

## 2023-01-13 NOTE — PLAN OF CARE
Problem: Potential for Falls  Goal: Patient will remain free of falls  Description: INTERVENTIONS:  - Educate patient/family on patient safety including physical limitations  - Instruct patient to call for assistance with activity   - Consult OT/PT to assist with strengthening/mobility   - Keep Call bell within reach  - Keep bed low and locked with side rails adjusted as appropriate  - Keep care items and personal belongings within reach  - Initiate and maintain comfort rounds  - Make Fall Risk Sign visible to staff  - Offer Toileting every 2 Hours, in advance of need  - Initiate/Maintain alarm  - Obtain necessary fall risk management equipment  - Apply yellow socks and bracelet for high fall risk patients  - Consider moving patient to room near nurses station  Outcome: Progressing     Problem: MOBILITY - ADULT  Goal: Maintain or return to baseline ADL function  Description: INTERVENTIONS:  -  Assess patient's ability to carry out ADLs; assess patient's baseline for ADL function and identify physical deficits which impact ability to perform ADLs (bathing, care of mouth/teeth, toileting, grooming, dressing, etc )  - Assess/evaluate cause of self-care deficits   - Assess range of motion  - Assess patient's mobility; develop plan if impaired  - Assess patient's need for assistive devices and provide as appropriate  - Encourage maximum independence but intervene and supervise when necessary  - Involve family in performance of ADLs  - Assess for home care needs following discharge   - Consider OT consult to assist with ADL evaluation and planning for discharge  - Provide patient education as appropriate  Outcome: Progressing  Goal: Maintains/Returns to pre admission functional level  Description: INTERVENTIONS:  - Perform BMAT or MOVE assessment daily    - Set and communicate daily mobility goal to care team and patient/family/caregiver     - Collaborate with rehabilitation services on mobility goals if consulted  - Perform Range of Motion 3 times a day  - Reposition patient every 3 hours    - Dangle patient 3 times a day  - Stand patient 3 times a day  - Ambulate patient 3 times a day  - Out of bed to chair 3 times a day   - Out of bed for meals 3 times a day  - Out of bed for toileting  - Record patient progress and toleration of activity level   Outcome: Progressing     Problem: PAIN - ADULT  Goal: Verbalizes/displays adequate comfort level or baseline comfort level  Description: Interventions:  - Encourage patient to monitor pain and request assistance  - Assess pain using appropriate pain scale  - Administer analgesics based on type and severity of pain and evaluate response  - Implement non-pharmacological measures as appropriate and evaluate response  - Consider cultural and social influences on pain and pain management  - Notify physician/advanced practitioner if interventions unsuccessful or patient reports new pain  Outcome: Progressing     Problem: INFECTION - ADULT  Goal: Absence or prevention of progression during hospitalization  Description: INTERVENTIONS:  - Assess and monitor for signs and symptoms of infection  - Monitor lab/diagnostic results  - Monitor all insertion sites, i e  indwelling lines, tubes, and drains  - Monitor endotracheal if appropriate and nasal secretions for changes in amount and color  - [place] appropriate cooling/warming therapies per order  - Administer medications as ordered  - Instruct and encourage patient and family to use good hand hygiene technique  - Identify and instruct in appropriate isolation precautions for identified infection/condition  Outcome: Progressing     Problem: DISCHARGE PLANNING  Goal: Discharge to home or other facility with appropriate resources  Description: INTERVENTIONS:  - Identify barriers to discharge w/patient and caregiver  - Arrange for needed discharge resources and transportation as appropriate  - Identify discharge learning needs (meds, wound care, etc )  - Arrange for interpretive services to assist at discharge as needed  - Refer to Case Management Department for coordinating discharge planning if the patient needs post-hospital services based on physician/advanced practitioner order or complex needs related to functional status, cognitive ability, or social support system  Outcome: Progressing     Problem: Knowledge Deficit  Goal: Patient/family/caregiver demonstrates understanding of disease process, treatment plan, medications, and discharge instructions  Description: Complete learning assessment and assess knowledge base    Interventions:  - Provide teaching at level of understanding  - Provide teaching via preferred learning methods  Outcome: Progressing     Problem: CARDIOVASCULAR - ADULT  Goal: Maintains optimal cardiac output and hemodynamic stability  Description: INTERVENTIONS:  - Monitor I/O, vital signs and rhythm  - Monitor for S/S and trends of decreased cardiac output  - Administer and titrate ordered vasoactive medications to optimize hemodynamic stability  - Assess quality of pulses, skin color and temperature  - Assess for signs of decreased coronary artery perfusion  - Instruct patient to report change in severity of symptoms  Outcome: Progressing  Goal: Absence of cardiac dysrhythmias or at baseline rhythm  Description: INTERVENTIONS:  - Continuous cardiac monitoring, vital signs, obtain 12 lead EKG if ordered  - Administer antiarrhythmic and heart rate control medications as ordered  - Monitor electrolytes and administer replacement therapy as ordered  Outcome: Progressing

## 2023-01-13 NOTE — ASSESSMENT & PLAN NOTE
- Occurred in March 2019: Symptoms included confusion, dysarthria, bilateral arm and leg weakness  - CTA revealed 73% proximal R ICA stenosis and R M1 occlusion - underwent thrombectomy   - MRI brain 3/20/2019:  · "Punctate acute infarcts in the right MCA territory within the subcortical, deep and periventricular white matter  No hemorrhagic conversion or mass effect  · Chronic lacunar infarcts in the right corona radiata and left parietal temporal periventricular white matter   Microangiopathic disease "  - Continue Plavix and statin for stroke prevention

## 2023-01-13 NOTE — ASSESSMENT & PLAN NOTE
· Ongoing since after his stroke in 2019  · Maintained on Aricept 5 mg twice daily  · Patient endorses significant short-term memory difficulties yesterday, which fluctuates daily  · Continue Aricept

## 2023-01-13 NOTE — OCCUPATIONAL THERAPY NOTE
Occupational Therapy Screen Note     Patient Name: Laurie NEGRON'S Date: 1/13/2023  Problem List  Principal Problem:    Hypertensive encephalopathy, transient  Active Problems:    Primary hypertension    Hypertriglyceridemia    Type 2 diabetes mellitus, without long-term current use of insulin ([facility] [address] )    Chronic ischemic right MCA stroke    Memory difficulties          01/13/23 1025   OT Last Visit   OT Visit Date 01/13/23   Note Type   Note type Screen   Additional Comments Chart review completed  Patient observed independently ambulating in room  Spoke with patient who reports that he is at his functional baseline  Reports that cognitive deficits are baseline  Family at bedside confirms  No inpatient OT needs  Will D/C OT orders         Houston Older, OTR/L

## 2023-01-13 NOTE — ASSESSMENT & PLAN NOTE
· Status post right MCA stroke in 2019 status post thrombectomy  · Maintained on dual antiplatelet therapy with full dose aspirin and Plavix 75 mg daily  · Continue aspirin, Plavix, and statin

## 2023-01-13 NOTE — DISCHARGE SUMMARY
[facility]  Discharge- Debbrah Cranker 1960, 60 y o  male MRN: [id number]  Unit/Bed#: -01 Encounter: [id number]  Primary Care Provider: ERIC Stoll   Date and time admitted to hospital: 1/12/2023  4:04 PM    * Hypertensive encephalopathy, transient  Assessment & Plan  · Pt presented d/t Confusion  · CTA head/neck: "Stable severe stenosis in the distal right M1 segment    No evidence of acute thrombus or large vessel occlusion within the major vessels of the [place] "  · SBP less than 160  · Neurology following  · MRI brain no acute abnormality   · Neurochecks every 2 hours with vital signs  · Monitor on telemetry   · Continue to trend BP  · Continue plavix and statin  · F/u with neuro outpt  · EEG outpt  · Cardio referral for loop recorder placement    Memory difficulties  Assessment & Plan  · Ongoing since after his stroke in 2019  · Maintained on Aricept 5 mg twice daily  · Patient endorses significant short-term memory difficulties yesterday, which fluctuates daily  · Continue Aricept    Chronic ischemic right MCA stroke  Assessment & Plan  · Status post right MCA stroke in 2019 status post thrombectomy  · Maintained on dual antiplatelet therapy with full dose aspirin and Plavix 75 mg daily  · Continue Plavix, and statin    Type 2 diabetes mellitus, without long-term current use of insulin (HCC)  Assessment & Plan  Lab Results   Component Value Date    HGBA1C 5 8 (H) 01/13/2023       Recent Labs     01/13/23  0000 01/13/23  0730   POCGLU 200* 97       Blood Sugar Average: Last 72 hrs:  · (P) 148 5Home regimen: Metformin 500 mg daily  ·   · Hold oral hypoglycemics and place on SSI  · Hypoglycemic protocol  · DM low salt diet    Hypertriglyceridemia  Assessment & Plan  · Home regimen: Lipitor 80 mg daily  · Continue    Primary hypertension  Assessment & Plan  · Home regimen: Toprol- mg daily and amlodipine 2 5 mg daily  · Blood pressure initially elevated on arrival to the ED, however improved without medical intervention    Medical Problems     Resolved Problems  Date Reviewed: 1/12/2023   None       Discharging Physician / Practitioner: Manolo Steven MD  PCP: Eyal Connelly  Admission Date:   Admission Orders (From admission, onward)     Ordered        01/12/23 2052  Place in Observation  Once                      Discharge Date: 01/13/23    Consultations During Hospital Stay:  · Neuro,   · CM  · PT  · OT  · ST    Procedures Performed:   MRI brain wo contrast   Final Result      No acute intracranial abnormality  Multiple unchanged chronic small infarcts in right frontoparietal corona radiata, left periatrial white matter, and left cerebellum with severe chronic microangiopathy  Workstation performed: [id number]         CTA head and neck with and without contrast   Final Result         1  Stable severe stenosis in the distal right M1 segment  No evidence of acute thrombus or large vessel occlusion within the major vessels of the [place]  2   Significant atherosclerotic plaque in the proximal internal carotid arteries without hemodynamically significant stenosis  Workstation performed: [id number]         XR chest 1 view portable   Final Result      No acute cardiopulmonary disease  Workstation performed: [id number]           ·     Significant Findings / Test Results:   · none    Incidental Findings:   · none   ·     Test Results Pending at Discharge (will require follow up):   · none     Outpatient Tests Requested:  · none    Complications:  None known at this time    Reason for Admission: Hypertensive encephalopathy    Hospital Course:   Zuleyka Murry is a 60 y o  male patient who originally presented to the hospital on 1/12/2023 due to confusion  In the ED blood pressures were found to be elevated neurology was consulted and recommended MRI    Blood pressure stabilized and mental status improved  Blood pressures have remained stable without any intervention  MRI showed no acute pathology  He has otherwise been hemodynamically stable and medically cleared for discharge  He is to follow-up with his PCP for blood pressure control and monitoring  Please see above list of diagnoses and related plan for additional information  Condition at Discharge: stable    Discharge Day Visit / Exam:   Subjective:  Soto Escobar was seen and examined at bedside  No acute events overnight  Discussed plan of care  All questions and concerns were answered and addressed  Vitals: Blood Pressure: 170/76 (01/13/23 0742)  Pulse: 60 (01/13/23 0742)  Temperature: 98 4 °F (36 9 °C) (01/13/23 0742)  Temp Source: Oral (01/13/23 0742)  Respirations: 16 (01/13/23 0742)  Height: 5' 6" (167 6 cm) (01/12/23 1551)  Weight - Scale: 98 3 kg (216 lb 11 4 oz) (01/13/23 0600)  SpO2: 95 % (01/13/23 0742)  Exam:   Physical Exam   Vitals and nursing note reviewed  Constitutional:       Appearance: Normal appearance  He is obese  He is not ill-appearing  HENT:      Head: Normocephalic and atraumatic  Cardiovascular:      Rate and Rhythm: Normal rate and regular rhythm  Pulses: Normal pulses  Heart sounds: Normal heart sounds  Pulmonary:      Effort: Pulmonary effort is normal       Breath sounds: Normal breath sounds  Abdominal:      General: Abdomen is flat  Bowel sounds are normal       Palpations: Abdomen is soft  Musculoskeletal:      Right lower leg: No edema  Left lower leg: No edema  Skin:     General: Skin is warm  Neurological:      General: No focal deficit present  Mental Status: He is alert and oriented to person, place, and time  Discussion with Family: Updated  (wife) at bedside  Discharge instructions/Information to patient and family:   See after visit summary for information provided to patient and family        Provisions for Follow-Up Care:  See after visit summary for information related to follow-up care and any pertinent home health orders  Disposition:   Home    Planned Readmission: no     Discharge Statement:  I spent 15 minutes discharging the patient  This time was spent on the day of discharge  I had direct contact with the patient on the day of discharge  Greater than 50% of the total time was spent examining patient, answering all patient questions, arranging and discussing plan of care with patient as well as directly providing post-discharge instructions  Additional time then spent on discharge activities  Discharge Medications:  See after visit summary for reconciled discharge medications provided to patient and/or family        **Please Note: This note may have been constructed using a voice recognition system**

## 2023-01-13 NOTE — ASSESSMENT & PLAN NOTE
78-year-old male with prior right MCA stroke, with residual left-sided weakness, memory impairment, hypertension, hyperlipidemia, diabetes mellitus, and prior tobacco use, presented to the ED on 1/12 due to brain fog, noted shortly after awakening  Patient reported forgetting why he was going into certain rooms and forgot to take his medications yesterday  His wife felt that he was slightly off balance, though does have some gait dysfunction at baseline  No other focal neurologic symptoms reported  CTA head and neck noted stable severe stenosis in the distal right M1  MRI brain was negative for acute pathology  At this time, suspect possible hypertensive encephalopathy, now improved  Cannot entirely exclude nocturnal seizure with post-ictal state given previous stroke, though lower suspicion  Plan:  - Can defer echocardiogram at this time given negative MRI, though do recommend interrogation of patient's loop recorder   - Hemoglobin A1c 5 8, lipid panel pending  - Continue Plavix and statin for stroke prevention as taking prior to admission  · Aspirin ordered on admission- Will discontinue   - Notify neurology with any changes in exam  - Goal normotension, though avoid any significant drops in BP given known M1 stenosis  Encourage patient to monitor BP closely at home    - Recommend outpatient EEG given prior juxtacortical stroke and transient confusion   - Medical management and supportive care per primary team  Correction of any metabolic or infectious disturbances    Leukocytosis noted this AM

## 2023-01-13 NOTE — UTILIZATION REVIEW
Initial Clinical Review    Admission: Date/Time/Statement: 1/12/23 2052 observation   Admission Orders (From admission, onward)     Ordered        01/12/23 2052  Place in Observation  Once                      Orders Placed This Encounter   Procedures   • Place in Observation     Standing Status:   Standing     Number of Occurrences:   1     Order Specific Question:   Level of Care     Answer:   Med Surg [16]     ED Arrival Information     Expected   -    Arrival   1/12/2023 15:45    Acuity   Emergent            Means of arrival   Walk-In    Escorted by   Spouse    Service   Hospitalist    Admission type   Emergency            Arrival complaint   poss stroke  gait issue, mental confusion            Chief Complaint   Patient presents with   • Gait Problem     Patient presents to the ED with c/o being off balanced and mental "fogginess" since yesterday  States he had a stroke 4 years ago  During triage, patient has equal , negative for facial droop, negative for slurred speech, negative for drift         Initial Presentation: 60 y o  male from home to ED admitted to observation due to Encephalopathy  PMH of stroke on Plavix and asa, DM, hpt, hypertrigyceridemia  Presented due to feeling like in a fog starting upon awakening the day prior to arrival   Forgets why went in room, per wife more confusion then usual still today and slightly worse ambulatory function, dropping things  Did not take medication yesterday  Has chronic right knee pain  On exam hypertensive  Has residual left sided weakness  + horizontal nystagmus bilaterally with abduction and adduction  Easily agitated  NIHSS 0    Glucose 158  Cta head and neck showed stable severe stenosis distal M1  Significant atherosclerotic plaque in the proximal internal carotid arteries  Plan includes:  Consult neurology, MRI brain, neuro checks every 2 hours  Telemetry  Continue home Aricept  Hold home metformin, start SSI    Continue home antihypertensives  Monitor BP      ED Triage Vitals   Temperature Pulse Respirations Blood Pressure SpO2   01/12/23 1553 01/12/23 1551 01/12/23 1551 01/12/23 1551 01/12/23 1551   98 4 °F (36 9 °C) 79 18 (!) 196/88 98 %      Temp Source Heart Rate Source Patient Position - Orthostatic VS BP Location FiO2 (%)   01/12/23 1553 01/12/23 1551 01/12/23 1551 01/12/23 1551 --   Temporal Monitor Sitting Left arm       Pain Score       01/12/23 1551       No Pain          Wt Readings from Last 1 Encounters:   01/13/23 98 3 kg (216 lb 11 4 oz)     Additional Vital Signs:   01/12/23 2145 98 6 °F (37 °C) 87 20 167/81 -- 99 % None (Room air) Sitting   01/12/23 2115 -- 80 18 135/93 110 97 % None (Room air) Sitting   01/12/23 2045 -- 63 18 183/79 Abnormal  113 99 % None (Room air) Sitting   01/12/23 2015 -- 64 18 162/72 109 96 % None (Room air) Sitting   01/12/23 2000 -- 72 18 166/77 111 97 % None (Room air) Lying   01/12/23 1700 -- 76 18 186/79 Abnormal  114 98 % None (Room air) --   01/12/23 1615 -- 80 18 191/83 Abnormal  119 98 % None (Room air)      Pertinent Labs/Diagnostic Test Results:   CTA head and neck with and without contrast   Final Result by Vaughn Álvarez MD (01/12 2012)         1  Stable severe stenosis in the distal right M1 segment  No evidence of acute thrombus or large vessel occlusion within the major vessels of the [place]  2   Significant atherosclerotic plaque in the proximal internal carotid arteries without hemodynamically significant stenosis  Workstation performed: [id number]         XR chest 1 view portable   Final Result by Aaliyah Jonas MD (01/12 1749)      No acute cardiopulmonary disease                    Workstation performed: [id number]         MRI brain wo contrast    (Results Pending)     1/12/23 ecg Normal sinus rhythm   Normal ECG   When compared with ECG of 18-FEB-2022 21:01,   T wave inversion no longer evident in Inferior leads   Nonspecific T wave abnormality, worse in Lateral leads  Results from last 7 days   Lab Units 01/12/23  2348   SARS-COV-2  Negative     Results from last 7 days   Lab Units 01/13/23  0458 01/12/23  1656   WBC Thousand/uL 11 21* 9 89   HEMOGLOBIN g/dL 16 2 15 9   HEMATOCRIT % 48 5 47 8   PLATELETS Thousands/uL 253 251   NEUTROS ABS Thousands/µL  --  5 70     Results from last 7 days   Lab Units 01/13/23  0458 01/12/23  1656   SODIUM mmol/L 139 134*   POTASSIUM mmol/L 4 4 3 9   CHLORIDE mmol/L 107 105   CO2 mmol/L 24 26   ANION GAP mmol/L 8 3*   BUN mg/dL 18 19   CREATININE mg/dL 0 85 0 92   EGFR ml/min/1 73sq m 92 88   CALCIUM mg/dL 9 0 8 9     Results from last 7 days   Lab Units 01/13/23  0458 01/12/23  1656   AST U/L  --  21   ALT U/L  --  34   ALK PHOS U/L  --  108   TOTAL PROTEIN g/dL  --  6 9   ALBUMIN g/dL  --  3 6   TOTAL BILIRUBIN mg/dL  --  0 30   AMMONIA umol/L 26  --      Results from last 7 days   Lab Units 01/13/23  0000   POC GLUCOSE mg/dl 200*     Results from last 7 days   Lab Units 01/13/23  0458 01/12/23  1656   GLUCOSE RANDOM mg/dL 99 158*     Results from last 7 days   Lab Units 01/13/23  0458 01/12/23  1656   HS TNI 0HR ng/L  --  13   HS TNI 4HR ng/L 12  --    HSTNI D4 ng/L -1  --      Results from last 7 days   Lab Units 01/13/23  0458   TSH 3RD GENERATON uIU/mL 3 437     Results from last 7 days   Lab Units 01/12/23  1923   CLARITY UA  Clear   COLOR UA  Yellow   SPEC GRAV UA  <1 005*   PH UA  6 5   GLUCOSE UA mg/dl 70 (7/100%)*   KETONES UA mg/dl Negative   BLOOD UA  Negative   PROTEIN UA mg/dl 30 (1+)*   NITRITE UA  Negative   BILIRUBIN UA  Negative   UROBILINOGEN UA (BE) mg/dl <2 0   LEUKOCYTES UA  Negative   WBC UA /hpf 0-1*   RBC UA /hpf 0-1*   BACTERIA UA /hpf None Seen   EPITHELIAL CELLS WET PREP /hpf Occasional     Results from last 7 days   Lab Units 01/12/23  3989   INFLUENZA A PCR  Negative   INFLUENZA B PCR  Negative   RSV PCR  Negative       ED Treatment:   Medication Administration from 01/12/2023 1545 to 01/12/2023 4188       Date/Time Order Dose Route Action Action by Comments   No medication      Past Medical History:   Diagnosis Date   • Diabetes mellitus ([address] )    • Dyslipidemia 3/26/2019   • Hypertension    • Stroke [facility])      Present on Admission:  • Type 2 diabetes mellitus, without long-term current use of insulin (HCC)  • Memory difficulties  • Hypertension  • Hypertriglyceridemia      Admitting Diagnosis: Hypertensive encephalopathy [I67 4]  Memory loss [R41 3]  Confusion [R41 0]  History of stroke [Z86 73]  Age/Sex: 60 y o  male  Admission Orders:  Scheduled Medications:  amLODIPine, 2 5 mg, Oral, Daily  aspirin, 325 mg, Oral, Daily  atorvastatin, 80 mg, Oral, After Dinner  citalopram, 20 mg, Oral, Daily  clopidogrel, 75 mg, Oral, Daily  donepezil, 5 mg, Oral, BID  enoxaparin, 40 mg, Subcutaneous, Daily  insulin lispro, 1-5 Units, Subcutaneous, HS  insulin lispro, 1-6 Units, Subcutaneous, TID AC  melatonin, 6 mg, Oral, HS  metoprolol succinate, 100 mg, Oral, Daily  pantoprazole, 20 mg, Oral, Daily Before Breakfast  tamsulosin, 0 4 mg, Oral, Daily With Dinner      Continuous IV Infusions: none      PRN Meds: not used  acetaminophen, 650 mg, Oral, Q6H PRN  calcium carbonate, 1,000 mg, Oral, Daily PRN  ondansetron, 4 mg, Intravenous, Q6H PRN  senna, 1 tablet, Oral, HS PRN    Telemetry  Neuro checks every 2 hours  IP CONSULT TO NEUROLOGY    Network Utilization Review Department  ATTENTION: Please call with any questions or concerns to [phone] and carefully listen to the prompts so that you are directed to the right person  All voicemails are confidential   Bella Doing all requests for admission clinical reviews, approved or denied determinations and any other requests to dedicated fax number below belonging to the campus where the patient is receiving treatment   List of dedicated fax numbers for the Facilities:  FACILITY NAME UR FAX NUMBER   ADMISSION DENIALS (Administrative/Medical Necessity) [address] [facility] (Maternity/NICU/Pediatrics) [address] [facility] [address] [phone]   [address] [place] [address] [facility] [address] [facility] 310 [facility] 134 815 [facility] [phone]

## 2023-01-13 NOTE — CASE MANAGEMENT
Case Management Assessment & Discharge Planning Note    Patient name Geo Lozano  Location [address] 335/-23 MRN [id number]  : 1960 Date 2023       Current Admission Date: 2023  Current Admission Diagnosis:Hypertensive encephalopathy, transient   Patient Active Problem List    Diagnosis Date Noted   • Hypertensive encephalopathy, transient 2023   • Snoring 08/10/2020   • Memory difficulties 08/10/2020   • Chronic ischemic right MCA stroke 2019   • Status post placement of implantable loop recorder 2019   • Type 2 diabetes mellitus, without long-term current use of insulin ([facility] ) 2019   • Adjustment reaction with anxiety 2019   • Insomnia 2019   • Fall 2019   • 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 ([facility] ) 2019   • Constipation 2019   • Urinary retention 2019   • Hypertriglyceridemia 2019   • Nicotine dependence 2019   • Stenosis of right carotid artery 2019   • Presbyopia 2019   • History of stroke 2019   • Headache 2019   • Primary hypertension 2019   • GERD (gastroesophageal reflux disease) 2019      LOS (days): 0  Geometric Mean LOS (GMLOS) (days):   Days to GMLOS:     OBJECTIVE:              Current admission status: Observation       Preferred Pharmacy:   [address] [place] [address]  Phone: [phone] Fax: [phone] [address]  Phone: [phone] Fax: [phone]    Primary Care Provider: ERIC Winter    Primary Insurance: [facility]  Secondary Insurance: BLUE CROSS    ASSESSMENT:  Mis[address] Representative - Spouse   Primary Phone: [phone] [facility]  Home Phone: [phone]               Advance Directives  Does patient have a [address] [place] [address] Avenue?: No  Was patient offered paperwork?: Yes (provided)  Does patient currently have a Health Care decision maker?: Yes, please see Health Care Proxy section  Does patient have Advance Directives?: No  Was patient offered paperwork?: Yes (provided)    Readmission Root Cause  30 Day Readmission: No    Patient Information  Admitted from[de-identified] Home  Mental Status: Alert  During Assessment patient was accompanied by: Not accompanied during assessment  Assessment information provided by[de-identified] Patient  Primary Caregiver: Self  Support Systems: Spouse/significant other  Home entry access options   Select all that apply : Stairs  Number of steps to enter home : 2  Type of Current Residence: [address] [place] home  Upon entering residence, is there a bedroom on the main floor (no further steps)?: Yes  Upon entering residence, is there a bathroom on the main floor (no further steps)?: Yes  In the last 12 months, was there a time when you were not able to pay the mortgage or rent on time?: No  In the last 12 months, how many places have you lived?: 1  In the last 12 months, was there a time when you did not have a steady place to sleep or slept in a shelter (including now)?: No  Homeless/housing insecurity resource given?: N/A  Living Arrangements: Lives w/ Spouse/significant other    Activities of Daily Living Prior to Admission  Functional Status: Independent  Completes ADLs independently?: Yes  Ambulates independently?: Yes  Does patient use assisted devices?: No  Does patient currently own DME?: No  Does patient have a history of Outpatient Therapy (PT/OT)?: No  Does the patient have a history of Short-Term Rehab?: No  Does patient have a history of HHC?: No  Does patient currently have [place] [address]?: No    Patient Information Continued  Does patient have prescription coverage?: Yes  Within the past 12 months, you worried that your food would run out before you got the money to buy more : Never true  Within the past 12 months, the food you bought just didn't last and you didn't have money to get more : Never true  Food insecurity resource given?: N/A  Does patient receive dialysis treatments?: No  Does patient have a history of substance abuse?: No  Does patient have a history of Mental Health Diagnosis?: No    Means of Transportation  Means of Transport to Appts[de-identified] Drives Self  In the past 12 months, has lack of transportation kept you from medical appointments or from getting medications?: No  In the past 12 months, has lack of transportation kept you from meetings, work, or from getting things needed for daily living?: No  Was application for public transport provided?: N/A    DISCHARGE DETAILS:    Discharge planning discussed with[de-identified] patient and wife  Freedom of Choice: Yes  Comments - Freedom of Choice: No anticipated dc needs  CM contacted family/caregiver?: Yes  Were Treatment Team discharge recommendations reviewed with patient/caregiver?: Yes  Did patient/caregiver verbalize understanding of patient care needs?: Yes  Were patient/caregiver advised of the risks associated with not following Treatment Team discharge recommendations?: Yes    Contacts  Patient Contacts: Mayo Long (wife)  Relationship to Patient[de-identified] Family  Contact Method: In Person  Reason/Outcome: Discharge [address] [place]         Is the patient interested in [address] at discharge?: No    DME Referral Provided  Referral made for DME?: No    Treatment Team Recommendation: Home  Discharge Destination Plan[de-identified] Home  Transport at Discharge : Family     Additional Comments: Met with pt to discuss the role of CM and to discuss any help pt may need prior to dc  Pt lives with his wife Mayo Long in a 2 story home with a 1st floor setup  Pt performed ADL's indptly pta, no use of DME  No hx of HHC or rehab  No hx of mental health or D&A treatment  Pt's preferred pharmacy is [facility] in [place]  Pt drives  Pt's wife will transport home at dc

## 2023-01-13 NOTE — ASSESSMENT & PLAN NOTE
· Confusion noted this morning at 0700 by wife, described as not answering question and mumbling his words, but not slurring his words necessarily -followed between 4014-0643  · Patient reports during this time he felt tired and simply did not feel like talking, denies any receptive or expressive aphasia  · CTA head/neck: "Stable severe stenosis in the distal right M1 segment  No evidence of acute thrombus or large vessel occlusion within the major vessels of the [place] "  · ED spoke with neurology, who recommended blood pressure control with goal SBP less than 160 and MRI brain  · Neurology consult  · MRI brain ordered  · Neurochecks every 2 hours with vital signs  · Monitor on telemetry   · Initial blood pressure in the /88 -improved to 135/93 without intervention  · On admission, NIHSS is 0  He does have 4/5  strength in LUE that is baseline per wife and patient, however no drift to b/l upper or lower extremities

## 2023-01-13 NOTE — PHYSICAL THERAPY NOTE
Physical Therapy Screen    Patient Name: Gunnar Watson    TWXQI'O Date: 1/13/2023     Problem List  Principal Problem:    Hypertensive encephalopathy, transient  Active Problems:    Primary hypertension    Hypertriglyceridemia    Type 2 diabetes mellitus, without long-term current use of insulin ([facility] )    Chronic ischemic right MCA stroke    Memory difficulties       Past Medical History  Past Medical History:   Diagnosis Date    Diabetes mellitus ([facility] )     Dyslipidemia 3/26/2019    Hypertension     Stroke [facility])         Past Surgical History  Past Surgical History:   Procedure Laterality Date    BACK SURGERY      IR STROKE ALERT  3/19/2019    SHOULDER SURGERY             01/13/23 1020   PT Last Visit   PT Visit Date 01/13/23   Note Type   Note type Screen   Additional Comments Chart review completed  Patient observed independently ambulating in room  Spoke with patient who reports that he is at his functional baseline  No inpatient P T  needs  D/C P  T  Vlad Gutierrez

## 2023-01-13 NOTE — ASSESSMENT & PLAN NOTE
· Home regimen: Toprol- mg daily and amlodipine 2 5 mg daily  · Blood pressure initially elevated on arrival to the ED, however improved without medical intervention

## 2023-01-13 NOTE — ASSESSMENT & PLAN NOTE
Lab Results   Component Value Date    HGBA1C 5 8 (H) 01/13/2023       Recent Labs     01/13/23  0000 01/13/23  0730   POCGLU 200* 97       Blood Sugar Average: Last 72 hrs:  · (P) 148 5Home regimen: Metformin 500 mg daily  ·   · Hold oral hypoglycemics and place on SSI  · Hypoglycemic protocol  · DM low salt diet

## 2023-01-13 NOTE — CONSULTS
Consultation - Neurology   Radha Lyon 60 y o  male MRN: [id number]  Unit/Bed#: -01 Encounter: [id number]      Assessment/Plan     ADDENDUM:   - Patient reports he was told his loop recorder is no longer working  No need to attempt interrogation at this time  No episodes of afib have been captured  * Hypertensive encephalopathy, transient  Assessment & Plan  54-year-old male with prior right MCA stroke, with residual left-sided weakness, memory impairment, hypertension, hyperlipidemia, diabetes mellitus, and prior tobacco use, presented to the ED on 1/12 due to brain fog, noted shortly after awakening  Patient reported forgetting why he was going into certain rooms and forgot to take his medications yesterday  His wife felt that he was slightly off balance, though does have some gait dysfunction at baseline  No other focal neurologic symptoms reported  CTA head and neck noted stable severe stenosis in the distal right M1  MRI brain was negative for acute pathology  At this time, suspect possible hypertensive encephalopathy, now improved  Cannot entirely exclude nocturnal seizure with post-ictal state given previous stroke, though lower suspicion  Plan:  - Can defer echocardiogram at this time given negative MRI, though do recommend interrogation of patient's loop recorder   - Hemoglobin A1c 5 8, lipid panel pending  - Continue Plavix and statin for stroke prevention as taking prior to admission  · Aspirin ordered on admission- Will discontinue   - Notify neurology with any changes in exam  - Goal normotension, though avoid any significant drops in BP given known M1 stenosis  Encourage patient to monitor BP closely at home    - Recommend outpatient EEG given prior juxtacortical stroke and transient confusion   - Medical management and supportive care per primary team  Correction of any metabolic or infectious disturbances    Leukocytosis noted this AM      Chronic ischemic right MCA stroke  Assessment & Plan  - Occurred in March 2019: Symptoms included confusion, dysarthria, bilateral arm and leg weakness  - CTA revealed 73% proximal R ICA stenosis and R M1 occlusion - underwent thrombectomy   - MRI brain 3/20/2019:  · "Punctate acute infarcts in the right MCA territory within the subcortical, deep and periventricular white matter  No hemorrhagic conversion or mass effect  · Chronic lacunar infarcts in the right corona radiata and left parietal temporal periventricular white matter  Microangiopathic disease "  - Continue Plavix and statin for stroke prevention        Follow-up as scheduled in the stroke clinic with Oleg Maurice PA-C on 1/23/2023  Outpatient EEG ordered as above- would prefer this be completed prior to appointment if able so results can be discussed  History of Present Illness     Reason for Consult / Principal Problem: Confusion w/history of stroke    HPI: Karrie King is a 60 y o  male with prior right MCA stroke s/p thrombectomy in 2019, with residual left hand clumsiness, on Plavix, memory impairment, on Aricept, hypertension, hyperlipidemia, diabetes mellitus, prior tobacco use, and GERD, presented to the ED on 1/12 due to confusion/fogginess  Patient reported that he forgot to take his medications yesterday  He was forgetting why he was going into certain rooms  His wife felt that he was confused and had slight gait dysfunction  Wife does report that the patient has residual left-sided weakness and right knee pain, so does have a slightly abnormal gait at baseline  Blood pressure on arrival 196/88  It improved to 135/93 without intervention  Vital signs stable  Lab work unremarkable  CTA head and neck noted stable severe stenosis in the distal right M1  There is also significant atherosclerotic plaque in the proximal ICAs without significant stenosis    Per chart, no focal symptoms on neurologic exam on arrival, other than baseline left-sided symptoms  MRI brain has been completed  No evidence of acute infarct  At re-evaluation with attending, patient's wife was present at bedside  She reports he was very tired appearing yesterday  She reports he did respond with at least one word responses, but would drop his head and almost go to sleep again  Wife denies any tremors or gaze deviation  He did sleep the night before  He gets up intermittently in the middle of the night to urinate, but this was not any different than other nights  He did not miss his medications the previous morning  Inpatient consult to Neurology  Consult performed by: Alejandra George PA-C  Consult ordered by: Laurie Ramirez PA-C          Review of Systems   Constitutional:        Fatigue resolved   HENT: Negative for trouble swallowing  Eyes: Negative for visual disturbance  Musculoskeletal:        Occasional baseline gait dysfunction due to prior surgery to his knee and occasional flareup of his pain, as well as residual left-sided weakness from prior stroke   Neurological: Positive for weakness  Negative for dizziness, tremors, facial asymmetry, speech difficulty, numbness and headaches  No longer feeling foggy    Baseline left-sided weakness, worse in arm compared to leg       Historical Information   Past Medical History:   Diagnosis Date   • Diabetes mellitus ([facility] )    • Dyslipidemia 3/26/2019   • Hypertension    • Stroke [facility])      Past Surgical History:   Procedure Laterality Date   • BACK SURGERY     • IR STROKE ALERT  3/19/2019   • SHOULDER SURGERY       Social History   Social History     Substance and Sexual Activity   Alcohol Use Not Currently     Social History     Substance and Sexual Activity   Drug Use Never     E-Cigarette/Vaping   • E-Cigarette Use Current Some Day User      E-Cigarette/Vaping Substances   • Nicotine No    • THC No    • CBD No    • Flavoring No    • Other No    • Unknown No      Social History     Tobacco Use   Smoking Status Former   Smokeless Tobacco Never     Family History:   Family History   Problem Relation Age of Onset   • Heart attack Mother    • Diabetes Mother    • Prostate cancer Father        Review of previous medical records was completed  Meds/Allergies   all current active meds have been reviewed, current meds:   Current Facility-Administered Medications   Medication Dose Route Frequency   • acetaminophen (TYLENOL) tablet 650 mg  650 mg Oral Q6H PRN   • amLODIPine (NORVASC) tablet 2 5 mg  2 5 mg Oral Daily   • atorvastatin (LIPITOR) tablet 80 mg  80 mg Oral After Dinner   • calcium carbonate (TUMS) chewable tablet 1,000 mg  1,000 mg Oral Daily PRN   • citalopram (CeleXA) tablet 20 mg  20 mg Oral Daily   • clopidogrel (PLAVIX) tablet 75 mg  75 mg Oral Daily   • donepezil (ARICEPT) tablet 5 mg  5 mg Oral BID   • enoxaparin (LOVENOX) subcutaneous injection 40 mg  40 mg Subcutaneous Daily   • insulin lispro (HumaLOG) 100 units/mL subcutaneous injection 1-5 Units  1-5 Units Subcutaneous HS   • insulin lispro (HumaLOG) 100 units/mL subcutaneous injection 1-6 Units  1-6 Units Subcutaneous TID AC   • melatonin tablet 6 mg  6 mg Oral HS   • metoprolol succinate (TOPROL-XL) 24 hr tablet 100 mg  100 mg Oral Daily   • ondansetron (ZOFRAN) injection 4 mg  4 mg Intravenous Q6H PRN   • pantoprazole (PROTONIX) EC tablet 20 mg  20 mg Oral Daily Before Breakfast   • senna (SENOKOT) tablet 8 6 mg  1 tablet Oral HS PRN   • tamsulosin (FLOMAX) capsule 0 4 mg  0 4 mg Oral Daily With Dinner    and PTA meds:   Prior to Admission Medications   Prescriptions Last Dose Informant Patient Reported? Taking?    Melatonin 10 MG TABS 1/11/2023  Yes Yes   Sig: Take 5 mg by mouth as needed Takes 2 tabs as needed   TOPROL  MG 24 hr tablet 1/12/2023  Yes Yes   Sig: Take 100 mg by mouth daily    amLODIPine (NORVASC) 2 5 mg tablet 1/12/2023  Yes Yes   Sig: Take 2 5 mg by mouth daily   aspirin 325 mg tablet 1/12/2023  Yes Yes   Sig: Take 325 mg by mouth daily   atorvastatin (LIPITOR) 40 mg tablet 2023  Yes Yes   Sig: Take 80 mg by mouth daily   citalopram (CeleXA) 10 mg tablet 2023  Yes Yes   Sig: Take 20 mg by mouth daily   clopidogrel (PLAVIX) 75 mg tablet 2023  No Yes   Sig: Take 1 tablet (75 mg total) by mouth daily   Patient taking differently: Take 80 mg by mouth daily   donepezil (ARICEPT) 5 mg tablet 2023  No Yes   Sig: TAKE 1 TABLET TWICE A DAY   fluticasone (FLONASE) 50 mcg/act nasal spray Past Week  No Yes   Si spray into each nostril daily   metFORMIN (GLUCOPHAGE) 500 mg tablet 2023  No Yes   Sig: Take 1 tablet (500 mg total) by mouth daily with breakfast   omeprazole (PriLOSEC OTC) 20 MG tablet 2023  Yes Yes   Sig: Take 20 mg by mouth daily   sildenafil (REVATIO) 20 mg tablet   Yes No   Sig: TAKE 2 3 TABLETS BY MOUTH AS NEEDED FOR SEXUAL ACTIVITY   tamsulosin (FLOMAX) 0 4 mg 2023  Yes Yes   Sig: Take 0 4 mg by mouth daily with dinner      Facility-Administered Medications: None       Allergies   Allergen Reactions   • Lisinopril Cough       Objective   Vitals:Blood pressure 170/76, pulse 60, temperature 98 4 °F (36 9 °C), temperature source Oral, resp  rate 16, height 5' 6" (1 676 m), weight 98 3 kg (216 lb 11 4 oz), SpO2 95 %  ,Body mass index is 34 98 kg/m²  Intake/Output Summary (Last 24 hours) at 2023 1018  Last data filed at 2023 0900  Gross per 24 hour   Intake 880 ml   Output --   Net 880 ml       Invasive Devices: Invasive Devices     Peripheral Intravenous Line  Duration           Peripheral IV 23 Right Antecubital <1 day                Physical Exam  Vitals and nursing note reviewed  Constitutional:       General: He is not in acute distress  Appearance: He is well-developed  He is not ill-appearing, toxic-appearing or diaphoretic  HENT:      Head: Normocephalic and atraumatic        Right Ear: External ear normal       Left Ear: External ear normal       Nose: Nose normal       Mouth/Throat:      Mouth: Mucous membranes are moist       Pharynx: Oropharynx is clear  Eyes:      General: No scleral icterus  Right eye: No discharge  Left eye: No discharge  Extraocular Movements: Extraocular movements intact  Conjunctiva/sclera: Conjunctivae normal       Pupils: Pupils are equal, round, and reactive to light  Comments: No nystagmus  Cardiovascular:      Rate and Rhythm: Normal rate  Pulmonary:      Effort: Pulmonary effort is normal  No respiratory distress  Musculoskeletal:         General: No deformity or signs of injury  Cervical back: Normal range of motion and neck supple  No rigidity  Right lower leg: No edema  Left lower leg: No edema  Comments: Slightly reduced range of motion of the left shoulder   Skin:     General: Skin is warm and dry  Coloration: Skin is not jaundiced or pale  Findings: No erythema  Neurological:      Mental Status: He is alert and oriented to person, place, and time  Coordination: Finger-Nose-Finger Test normal       Comments: Detailed below   Psychiatric:         Mood and Affect: Mood normal          Speech: Speech normal          Behavior: Behavior normal          Thought Content: Thought content normal          Judgment: Judgment normal        Neurologic Exam     Mental Status   Oriented to person, place, and time  Attention: normal  Concentration: normal    Speech: speech is normal   Level of consciousness: alert  Able to name object  Follows all commands  Able to state current and past 2 presidents  Cranial Nerves     CN II   Visual fields full to confrontation  CN III, IV, VI   Pupils are equal, round, and reactive to light  Upgaze: normal  Downgaze: normal  Conjugate gaze: present    CN V   Facial sensation intact  CN VII   Facial expression full, symmetric       CN VIII   Hearing: intact (To voice)    CN XII   Tongue: not atrophic  Fasciculations: absent  Tongue deviation: none    Motor Exam   Muscle bulk: normal    Strength   Strength 5/5 except as noted  4/5 throughout left upper extremity     Sensory Exam   Light touch normal    No neglect  Gait, Coordination, and Reflexes     Coordination   Finger to nose coordination: normal    Tremor   Resting tremor: absent    Upon standing, patient's balance intact  Able to take several steps forward and backwards without difficulty  Slight tremor due to weakness of the left upper extremity and shoulder pain when doing finger-to-nose testing  Lab Results: I have personally reviewed pertinent reports  Imaging Studies: I have personally reviewed pertinent reports  and I have personally reviewed pertinent films in PACS (CTA, MRI)  EKG, Pathology, and Other Studies: I have personally reviewed pertinent reports      VTE Prophylaxis: Sequential compression device (Venodyne)  and Enoxaparin (Lovenox)    Code Status: Level 3 - DNAR and DNI

## 2023-01-13 NOTE — ED NOTES
Pt transported to MRI  MRI states they can not complete scan without loop recorder cards and pt does not have them        Son Keyes RN  01/12/23 3487

## 2023-01-13 NOTE — ASSESSMENT & PLAN NOTE
Lab Results   Component Value Date    HGBA1C 5 7 03/19/2019       No results for input(s): POCGLU in the last 72 hours      Blood Sugar Average: Last 72 hrs:  · Home regimen: Metformin 500 mg daily  · Recheck hemoglobin A1c  · Hold oral hypoglycemics and place on SSI

## 2023-01-14 LAB — BACTERIA UR CULT: NORMAL

## 2023-01-16 LAB — VIT B1 BLD-SCNC: 165.8 NMOL/L (ref 66.5–200)

## 2023-01-17 ENCOUNTER — TELEPHONE (OUTPATIENT)
Dept: NEUROLOGY | Facility: CLINIC | Age: 63
End: 2023-01-17

## 2023-01-17 NOTE — TELEPHONE ENCOUNTER
LMOM to remind pt of upcoming appt on 01/23/23  left the office number for any questions or concerns

## 2023-01-19 LAB — METHYLMALONATE SERPL-SCNC: 131 NMOL/L (ref 0–378)

## 2023-01-20 ENCOUNTER — OFFICE VISIT (OUTPATIENT)
Dept: NEUROLOGY | Facility: CLINIC | Age: 63
End: 2023-01-20

## 2023-01-20 VITALS
TEMPERATURE: 97.9 F | DIASTOLIC BLOOD PRESSURE: 75 MMHG | WEIGHT: 229.2 LBS | HEART RATE: 68 BPM | HEIGHT: 66 IN | BODY MASS INDEX: 36.83 KG/M2 | SYSTOLIC BLOOD PRESSURE: 180 MMHG

## 2023-01-20 DIAGNOSIS — I67.4 HYPERTENSIVE ENCEPHALOPATHY: ICD-10-CM

## 2023-01-20 DIAGNOSIS — Z86.73 HISTORY OF STROKE: Primary | ICD-10-CM

## 2023-01-20 DIAGNOSIS — R41.3 MEMORY DIFFICULTIES: ICD-10-CM

## 2023-01-20 DIAGNOSIS — I63.511 RIGHT MIDDLE CEREBRAL ARTERY STROKE (HCC): ICD-10-CM

## 2023-01-20 DIAGNOSIS — I65.21 STENOSIS OF RIGHT CAROTID ARTERY: ICD-10-CM

## 2023-01-20 NOTE — ASSESSMENT & PLAN NOTE
-Overall from a stroke standpoint he is doing relatively well   -No major concerns or exam today   -He is to continue with plan as outlined below for stroke prevention   -In regard to his recent hospitalization for possible seizure, description of event below  Low suspicion for seizure at this time  However routine EEG ordered, advised him to complete  -Advised him to consider repeat sleep study and possible follow-up with sleep medicine for concerns of sleep apnea  -Emotional support provided in terms of his mood  Although he is overall much improved, discussed with him to follow-up with his PCP regarding Celexa  He is overall tolerating well but feels he can still use some slight improvement in his mood  -Discussed importance of blood pressure control  He reports he recently purchased a blood pressure cuff  Recommended that he check his blood pressure at least daily for the next week and to record those numbers so that we can see a trend  He does have a follow-up appointment with his PCP next week, advised him to take those readings with him

## 2023-01-20 NOTE — PATIENT INSTRUCTIONS
For ongoing stroke prevention   - Continue: Plavix, Lipitor  -Complete EEG  -Please follow with PCP regarding continued elevated triglycerides  -Please consider repeat sleep study and revaluation with sleep medicine   - Discuss with PCP about your Celexa   - Recommend to check blood pressure occasionally away from the doctor's office to make sure that those numbers are typically less than 130/80  If they are frequently higher than that, we recommend checking a little more often and to follow up with primary care team   - Will defer to primary care team for monitoring of cholesterol panel and blood sugar numbers with target LDL cholesterol of less than 70 and hemoglobin A1c less than 7%  - Recommend following a low salt, mediterranean diet   - Recommend routine physical exercise as tolerated       We will plan for him to return to the office in 6 months but would be happy to see him sooner if the need should arise  If he has any symptoms concerning for TIA or stroke including sudden painless loss of vision or double vision, difficulty speaking or swallowing, vertigo/room spinning that does not quickly resolve, or weakness/numbness/loss of coordination affecting 1 side of the face or body he should proceed by ambulance to the nearest emergency room immediately

## 2023-01-20 NOTE — PROGRESS NOTES
Review of Systems   Constitutional: Negative  Negative for appetite change and fever  HENT: Negative  Negative for hearing loss, tinnitus, trouble swallowing and voice change  Eyes: Negative  Negative for photophobia, pain and visual disturbance  Respiratory: Negative  Negative for shortness of breath  Cardiovascular: Negative  Negative for palpitations  Gastrointestinal: Negative  Negative for nausea and vomiting  Endocrine: Negative  Negative for cold intolerance  Genitourinary: Negative  Negative for dysuria, frequency and urgency  Musculoskeletal: Positive for gait problem (Balance concerns and legs tiring easily when walking)  Negative for myalgias and neck pain  Skin: Negative  Negative for rash  Allergic/Immunologic: Negative  Neurological: Positive for weakness (Bilateral legs/ Cramping at night)  Negative for dizziness, tremors, seizures, syncope, facial asymmetry, speech difficulty, light-headedness, numbness and headaches  Hematological: Negative  Does not bruise/bleed easily  Psychiatric/Behavioral: Negative  Negative for confusion, hallucinations and sleep disturbance

## 2023-01-20 NOTE — PROGRESS NOTES
Patient ID: Mena Styles is a 60 y o  male  who presents for follow-up evaluation with regard to his prior stroke (2019)  History of right ICA stenosis and right M1 occlusion with thrombectomy  No residual deficits  Assessment/Plan:    Memory difficulties  -Overall stable, will continue to monitor clinically   -Continue Aricept    History of stroke  -Overall from a stroke standpoint he is doing relatively well   -No major concerns or exam today   -He is to continue with plan as outlined below for stroke prevention   -In regard to his recent hospitalization for possible seizure, description of event below  Low suspicion for seizure at this time  However routine EEG ordered, advised him to complete  -Advised him to consider repeat sleep study and possible follow-up with sleep medicine for concerns of sleep apnea  -Emotional support provided in terms of his mood  Although he is overall much improved, discussed with him to follow-up with his PCP regarding Celexa  He is overall tolerating well but feels he can still use some slight improvement in his mood  -Discussed importance of blood pressure control  He reports he recently purchased a blood pressure cuff  Recommended that he check his blood pressure at least daily for the next week and to record those numbers so that we can see a trend  He does have a follow-up appointment with his PCP next week, advised him to take those readings with him  Plan discussed with patient today:  Patient Instructions   For ongoing stroke prevention   - Continue: Plavix, Lipitor  -Complete EEG  -Please follow with PCP regarding continued elevated triglycerides  -Please consider repeat sleep study and revaluation with sleep medicine   - Discuss with PCP about your Celexa   - Recommend to check blood pressure occasionally away from the doctor's office to make sure that those numbers are typically less than 130/80    If they are frequently higher than that, we recommend checking a little more often and to follow up with primary care team   - Will defer to primary care team for monitoring of cholesterol panel and blood sugar numbers with target LDL cholesterol of less than 70 and hemoglobin A1c less than 7%  - Recommend following a low salt, mediterranean diet   - Recommend routine physical exercise as tolerated       We will plan for him to return to the office in 6 months but would be happy to see him sooner if the need should arise  If he has any symptoms concerning for TIA or stroke including sudden painless loss of vision or double vision, difficulty speaking or swallowing, vertigo/room spinning that does not quickly resolve, or weakness/numbness/loss of coordination affecting 1 side of the face or body he should proceed by ambulance to the nearest emergency room immediately  Subjective:    HPI  Current stroke prevention medications:  1  Plavix 75 mg  2  Atorvastatin 40 mg    Since his last visit,He presented to the ED on 1/13/2023 with an episode of brain fog after awakening, his wife had noticed that he was slightly off balance, he does have some gait dysfunction at baseline from previous stroke  CTA revealed stable severe stenosis in the distal right M1  MRI was unremarkable for acute pathology  Hypertensive encephalopathy was suspected, with notable improvement  Plan to rule out nocturnal seizure with postictal state and interrogation of patient's current loop recorder  He sat on his chair and his wife said that he "zunged" out and that she was asking him questions and that he was not responding well  As the day went on he got better and better he felt like he was in a fog He says that he was aware that his wife was speaking to him but that his responses were slow  He denies any loss of consciousness he felt like all he wanted to do was close his eyes and sleep   As he improved he didn't feel confused but did feel like he wasn't thinking as clearly  Today he does not report any new stroke symptoms or since his hospitalization  He takes his medication as prescribed and did not endorse any bleeding or bruising issues  He has not had any recent falls  Memory:   - He still has trouble remembering what he does, why he doing and how to do it but is eventually able to remember   - Will have difficulty remembering how to do tasks and things that he should know how to do  -Finds that he also has a lot of difficulty multitasking and needs to be concentrated on 1 task at a time   -Overall, however, he does not feel like his memory is becoming significantly worse or progressing   -He does feel that the Aricept has helped    Mood:   - He finds that he can get very frustrated and irritable because he can no longer able to things that he used to do  He is looking into becoming more active with the weather getting warmer  Although his mood has much improved since the stroke initially happened   -He was started on Celexa by his PCP  -Does feel like this has helped slightly  -Appetite is good    Sleep:   -He did have a sleep study completed about 2 years ago that showed moderate sleep apnea    -He does not use a CPAP, he does snore  The following portions of the patient's history were reviewed and updated as appropriate: allergies, current medications, past family history, past medical history, past social history, past surgical history and problem list           Objective:    Blood pressure (!) 180/75, pulse 68, temperature 97 9 °F (36 6 °C), temperature source Temporal, height 5' 6" (1 676 m), weight 104 kg (229 lb 3 2 oz)      Lab Results   Component Value Date/Time    CHOLESTEROL 128 01/13/2023 04:58 AM     Lab Results   Component Value Date/Time    TRIG 246 (H) 01/13/2023 04:58 AM     Lab Results   Component Value Date/Time    HDL 35 (L) 01/13/2023 04:58 AM     Lab Results   Component Value Date/Time    LDLCALC 44 01/13/2023 04:58 AM       Lab Results Component Value Date/Time    HGBA1C 5 8 (H) 01/13/2023 04:58 AM     Lab Results   Component Value Date/Time     01/13/2023 04:58 AM     Neurological Exam  Awake, alert, oriented, and in no apparent distress  Mood is appropriate to situation  Speech is fluent with no dysarthria or aphasia  Cranial nerves 2-12 were symmetrically intact bilaterally  Motor testing reveals 5/5 strength in the bilateral upper and lower extremities  Sensation is intact to light touch in the bilateral upper and lower extremities  Decreased sensation to temperature, vibration, and pinprick in the left lower extremity, however he feels this is baseline from back surgery over 30 years ago  Coordination intact, no drift present  Finger-to-nose absent for tremor, dysmetria, or ataxia  DTRs are symmetric and intact bilaterally  Able to rise without assistance, gait is slightly unsteady, with some decreased foot clearance of the left leg, but overall stable  Review of Systems  Constitutional: Negative  Negative for appetite change and fever  HENT: Negative  Negative for hearing loss, tinnitus, trouble swallowing and voice change  Eyes: Negative  Negative for photophobia, pain and visual disturbance  Respiratory: Negative  Negative for shortness of breath  Cardiovascular: Negative  Negative for palpitations  Gastrointestinal: Negative  Negative for nausea and vomiting  Endocrine: Negative  Negative for cold intolerance  Genitourinary: Negative  Negative for dysuria, frequency and urgency  Musculoskeletal: Positive for gait problem (Balance concerns and legs tiring easily when walking)  Negative for myalgias and neck pain  Skin: Negative  Negative for rash  Allergic/Immunologic: Negative  Neurological: Positive for weakness (Bilateral legs/ Cramping at night)  Negative for dizziness, tremors, seizures, syncope, facial asymmetry, speech difficulty, light-headedness, numbness and headaches  Hematological: Negative  Does not bruise/bleed easily  Psychiatric/Behavioral: Negative  Negative for confusion, hallucinations and sleep disturbance  I personally reviewed the ROS that was entered by the medical assistant  I have spent 40 minutes with Patient  today in which greater than 50% of this time was spent in counseling/coordination of care regarding Diagnostic results, Prognosis, Risks and benefits of tx options, Intructions for management, Patient and family education, Importance of tx compliance, Risk factor reductions, Impressions and Plan of care as above

## 2023-02-07 DIAGNOSIS — R41.3 MEMORY LOSS: ICD-10-CM

## 2023-02-07 RX ORDER — DONEPEZIL HYDROCHLORIDE 5 MG/1
TABLET, FILM COATED ORAL
Qty: 180 TABLET | Refills: 3 | Status: SHIPPED | OUTPATIENT
Start: 2023-02-07

## 2023-04-16 ENCOUNTER — APPOINTMENT (EMERGENCY)
Dept: CT IMAGING | Facility: HOSPITAL | Age: 63
End: 2023-04-16

## 2023-04-16 ENCOUNTER — HOSPITAL ENCOUNTER (INPATIENT)
Facility: HOSPITAL | Age: 63
LOS: 1 days | Discharge: HOME | End: 2023-04-18
Attending: EMERGENCY MEDICINE | Admitting: STUDENT IN AN ORGANIZED HEALTH CARE EDUCATION/TRAINING PROGRAM

## 2023-04-16 ENCOUNTER — APPOINTMENT (EMERGENCY)
Dept: RADIOLOGY | Facility: HOSPITAL | Age: 63
End: 2023-04-16

## 2023-04-16 DIAGNOSIS — R40.4 TRANSIENT ALTERATION OF AWARENESS: ICD-10-CM

## 2023-04-16 DIAGNOSIS — I16.0 HYPERTENSIVE URGENCY: ICD-10-CM

## 2023-04-16 DIAGNOSIS — I63.9 STROKE (HCC): Primary | ICD-10-CM

## 2023-04-16 DIAGNOSIS — R41.82 ALTERED MENTAL STATUS, UNSPECIFIED ALTERED MENTAL STATUS TYPE: ICD-10-CM

## 2023-04-16 PROBLEM — M54.50 CHRONIC LOW BACK PAIN: Status: ACTIVE | Noted: 2023-04-16

## 2023-04-16 PROBLEM — G89.29 CHRONIC LOW BACK PAIN: Status: ACTIVE | Noted: 2023-04-16

## 2023-04-16 LAB
2HR DELTA HS TROPONIN: -4 NG/L
ALBUMIN SERPL BCP-MCNC: 4.3 G/DL (ref 3.5–5)
ALP SERPL-CCNC: 89 U/L (ref 34–104)
ALT SERPL W P-5'-P-CCNC: 28 U/L (ref 7–52)
ANION GAP SERPL CALCULATED.3IONS-SCNC: 6 MMOL/L (ref 4–13)
APAP SERPL-MCNC: <10 UG/ML (ref 10–20)
APTT PPP: 28 SECONDS (ref 23–37)
AST SERPL W P-5'-P-CCNC: 20 U/L (ref 13–39)
ATRIAL RATE: 91 BPM
BASOPHILS # BLD AUTO: 0.13 THOUSANDS/ΜL (ref 0–0.1)
BASOPHILS NFR BLD AUTO: 1 % (ref 0–1)
BILIRUB SERPL-MCNC: 0.5 MG/DL (ref 0.2–1)
BUN SERPL-MCNC: 19 MG/DL (ref 5–25)
CALCIUM SERPL-MCNC: 9.1 MG/DL (ref 8.4–10.2)
CARDIAC TROPONIN I PNL SERPL HS: 17 NG/L
CARDIAC TROPONIN I PNL SERPL HS: 21 NG/L
CHLORIDE SERPL-SCNC: 106 MMOL/L (ref 96–108)
CO2 SERPL-SCNC: 24 MMOL/L (ref 21–32)
CREAT SERPL-MCNC: 0.83 MG/DL (ref 0.6–1.3)
EOSINOPHIL # BLD AUTO: 0.41 THOUSAND/ΜL (ref 0–0.61)
EOSINOPHIL NFR BLD AUTO: 3 % (ref 0–6)
ERYTHROCYTE [DISTWIDTH] IN BLOOD BY AUTOMATED COUNT: 13.9 % (ref 11.6–15.1)
ETHANOL SERPL-MCNC: <10 MG/DL
GFR SERPL CREATININE-BSD FRML MDRD: 92 ML/MIN/1.73SQ M
GLUCOSE SERPL-MCNC: 115 MG/DL (ref 65–140)
HCT VFR BLD AUTO: 46.3 % (ref 36.5–49.3)
HGB BLD-MCNC: 15.8 G/DL (ref 12–17)
IMM GRANULOCYTES # BLD AUTO: 0.06 THOUSAND/UL (ref 0–0.2)
IMM GRANULOCYTES NFR BLD AUTO: 0 % (ref 0–2)
INR PPP: 0.89 (ref 0.84–1.19)
LYMPHOCYTES # BLD AUTO: 3.73 THOUSANDS/ΜL (ref 0.6–4.47)
LYMPHOCYTES NFR BLD AUTO: 25 % (ref 14–44)
MCH RBC QN AUTO: 32.2 PG (ref 26.8–34.3)
MCHC RBC AUTO-ENTMCNC: 34.1 G/DL (ref 31.4–37.4)
MCV RBC AUTO: 95 FL (ref 82–98)
MONOCYTES # BLD AUTO: 1.36 THOUSAND/ΜL (ref 0.17–1.22)
MONOCYTES NFR BLD AUTO: 9 % (ref 4–12)
NEUTROPHILS # BLD AUTO: 9.1 THOUSANDS/ΜL (ref 1.85–7.62)
NEUTS SEG NFR BLD AUTO: 62 % (ref 43–75)
NRBC BLD AUTO-RTO: 0 /100 WBCS
P AXIS: 43 DEGREES
PLATELET # BLD AUTO: 263 THOUSANDS/UL (ref 149–390)
PMV BLD AUTO: 9.6 FL (ref 8.9–12.7)
POTASSIUM SERPL-SCNC: 3.7 MMOL/L (ref 3.5–5.3)
PR INTERVAL: 128 MS
PROT SERPL-MCNC: 7.4 G/DL (ref 6.4–8.4)
PROTHROMBIN TIME: 12.7 SECONDS (ref 11.6–14.5)
QRS AXIS: 36 DEGREES
QRSD INTERVAL: 94 MS
QT INTERVAL: 368 MS
QTC INTERVAL: 452 MS
RBC # BLD AUTO: 4.9 MILLION/UL (ref 3.88–5.62)
SALICYLATES SERPL-MCNC: <5 MG/DL (ref 3–20)
SODIUM SERPL-SCNC: 136 MMOL/L (ref 135–147)
T WAVE AXIS: 75 DEGREES
VENTRICULAR RATE: 91 BPM
WBC # BLD AUTO: 14.79 THOUSAND/UL (ref 4.31–10.16)

## 2023-04-16 RX ORDER — TAMSULOSIN HYDROCHLORIDE 0.4 MG/1
0.4 CAPSULE ORAL
Status: CANCELLED | OUTPATIENT
Start: 2023-04-17

## 2023-04-16 RX ORDER — CITALOPRAM 10 MG/1
20 TABLET ORAL DAILY
Status: CANCELLED | OUTPATIENT
Start: 2023-04-17

## 2023-04-16 RX ORDER — ATORVASTATIN CALCIUM 40 MG/1
80 TABLET, FILM COATED ORAL DAILY
Status: CANCELLED | OUTPATIENT
Start: 2023-04-17

## 2023-04-16 RX ORDER — ASPIRIN 81 MG/1
81 TABLET ORAL DAILY
Status: DISCONTINUED | OUTPATIENT
Start: 2023-04-17 | End: 2023-04-16

## 2023-04-16 RX ORDER — PANTOPRAZOLE SODIUM 20 MG/1
20 TABLET, DELAYED RELEASE ORAL
Status: CANCELLED | OUTPATIENT
Start: 2023-04-17

## 2023-04-16 RX ORDER — ONDANSETRON 2 MG/ML
4 INJECTION INTRAMUSCULAR; INTRAVENOUS EVERY 6 HOURS PRN
Status: DISCONTINUED | OUTPATIENT
Start: 2023-04-16 | End: 2023-04-18 | Stop reason: HOSPADM

## 2023-04-16 RX ORDER — LABETALOL HYDROCHLORIDE 5 MG/ML
10 INJECTION, SOLUTION INTRAVENOUS ONCE
Status: COMPLETED | OUTPATIENT
Start: 2023-04-16 | End: 2023-04-16

## 2023-04-16 RX ORDER — TRAMADOL HYDROCHLORIDE 50 MG/1
50 TABLET ORAL EVERY 6 HOURS PRN
Status: DISCONTINUED | OUTPATIENT
Start: 2023-04-16 | End: 2023-04-18 | Stop reason: HOSPADM

## 2023-04-16 RX ORDER — TAMSULOSIN HYDROCHLORIDE 0.4 MG/1
0.4 CAPSULE ORAL
Status: DISCONTINUED | OUTPATIENT
Start: 2023-04-17 | End: 2023-04-18 | Stop reason: HOSPADM

## 2023-04-16 RX ORDER — LABETALOL HYDROCHLORIDE 5 MG/ML
5 INJECTION, SOLUTION INTRAVENOUS EVERY 6 HOURS PRN
Status: DISCONTINUED | OUTPATIENT
Start: 2023-04-16 | End: 2023-04-17

## 2023-04-16 RX ORDER — IBUPROFEN 600 MG/1
600 TABLET ORAL EVERY 6 HOURS PRN
Status: DISCONTINUED | OUTPATIENT
Start: 2023-04-16 | End: 2023-04-18 | Stop reason: HOSPADM

## 2023-04-16 RX ORDER — PANTOPRAZOLE SODIUM 20 MG/1
20 TABLET, DELAYED RELEASE ORAL
Status: DISCONTINUED | OUTPATIENT
Start: 2023-04-17 | End: 2023-04-18 | Stop reason: HOSPADM

## 2023-04-16 RX ORDER — ACETAMINOPHEN 325 MG/1
650 TABLET ORAL EVERY 6 HOURS PRN
Status: DISCONTINUED | OUTPATIENT
Start: 2023-04-16 | End: 2023-04-18 | Stop reason: HOSPADM

## 2023-04-16 RX ORDER — ENOXAPARIN SODIUM 100 MG/ML
40 INJECTION SUBCUTANEOUS DAILY
Status: DISCONTINUED | OUTPATIENT
Start: 2023-04-17 | End: 2023-04-18 | Stop reason: HOSPADM

## 2023-04-16 RX ORDER — DONEPEZIL HYDROCHLORIDE 5 MG/1
5 TABLET, FILM COATED ORAL 2 TIMES DAILY
Status: DISCONTINUED | OUTPATIENT
Start: 2023-04-17 | End: 2023-04-18 | Stop reason: HOSPADM

## 2023-04-16 RX ORDER — CITALOPRAM 20 MG/1
20 TABLET ORAL DAILY
Status: DISCONTINUED | OUTPATIENT
Start: 2023-04-17 | End: 2023-04-18 | Stop reason: HOSPADM

## 2023-04-16 RX ORDER — ATORVASTATIN CALCIUM 40 MG/1
80 TABLET, FILM COATED ORAL
Status: DISCONTINUED | OUTPATIENT
Start: 2023-04-17 | End: 2023-04-18 | Stop reason: HOSPADM

## 2023-04-16 RX ORDER — DONEPEZIL HYDROCHLORIDE 5 MG/1
5 TABLET, FILM COATED ORAL 2 TIMES DAILY
Status: CANCELLED | OUTPATIENT
Start: 2023-04-17

## 2023-04-16 RX ORDER — CLOPIDOGREL BISULFATE 75 MG/1
75 TABLET ORAL DAILY
Status: CANCELLED | OUTPATIENT
Start: 2023-04-17

## 2023-04-16 RX ORDER — CLOPIDOGREL BISULFATE 75 MG/1
75 TABLET ORAL DAILY
Status: DISCONTINUED | OUTPATIENT
Start: 2023-04-17 | End: 2023-04-16

## 2023-04-16 RX ORDER — ASPIRIN 325 MG
325 TABLET ORAL ONCE
Status: COMPLETED | OUTPATIENT
Start: 2023-04-16 | End: 2023-04-16

## 2023-04-16 RX ORDER — ASPIRIN 81 MG/1
81 TABLET ORAL DAILY
Status: DISCONTINUED | OUTPATIENT
Start: 2023-04-17 | End: 2023-04-18 | Stop reason: HOSPADM

## 2023-04-16 RX ORDER — CLOPIDOGREL BISULFATE 75 MG/1
75 TABLET ORAL DAILY
Status: DISCONTINUED | OUTPATIENT
Start: 2023-04-17 | End: 2023-04-18 | Stop reason: HOSPADM

## 2023-04-16 RX ORDER — LIDOCAINE 50 MG/G
2 PATCH TOPICAL DAILY
Status: DISCONTINUED | OUTPATIENT
Start: 2023-04-17 | End: 2023-04-18 | Stop reason: HOSPADM

## 2023-04-16 RX ORDER — CLOPIDOGREL BISULFATE 75 MG/1
300 TABLET ORAL ONCE
Status: COMPLETED | OUTPATIENT
Start: 2023-04-16 | End: 2023-04-16

## 2023-04-16 RX ADMIN — LABETALOL HYDROCHLORIDE 10 MG: 5 INJECTION, SOLUTION INTRAVENOUS at 21:16

## 2023-04-16 RX ADMIN — IOHEXOL 85 ML: 350 INJECTION, SOLUTION INTRAVENOUS at 21:48

## 2023-04-16 RX ADMIN — ASPIRIN 325 MG ORAL TABLET 325 MG: 325 PILL ORAL at 22:29

## 2023-04-16 RX ADMIN — CLOPIDOGREL BISULFATE 300 MG: 75 TABLET, FILM COATED ORAL at 22:29

## 2023-04-17 ENCOUNTER — APPOINTMENT (INPATIENT)
Dept: CT IMAGING | Facility: HOSPITAL | Age: 63
End: 2023-04-17

## 2023-04-17 ENCOUNTER — APPOINTMENT (INPATIENT)
Dept: MRI IMAGING | Facility: HOSPITAL | Age: 63
End: 2023-04-17

## 2023-04-17 PROBLEM — I66.01 MIDDLE CEREBRAL ARTERY STENOSIS, RIGHT: Status: ACTIVE | Noted: 2023-04-17

## 2023-04-17 PROBLEM — R41.82 ALTERED MENTAL STATUS: Status: ACTIVE | Noted: 2023-04-17

## 2023-04-17 LAB
ALBUMIN SERPL BCP-MCNC: 3.9 G/DL (ref 3.5–5)
ALP SERPL-CCNC: 75 U/L (ref 34–104)
ALT SERPL W P-5'-P-CCNC: 22 U/L (ref 7–52)
AMPHETAMINES SERPL QL SCN: NEGATIVE
ANION GAP SERPL CALCULATED.3IONS-SCNC: 7 MMOL/L (ref 4–13)
AST SERPL W P-5'-P-CCNC: 19 U/L (ref 13–39)
BACTERIA UR QL AUTO: NORMAL /HPF
BARBITURATES UR QL: NEGATIVE
BASOPHILS # BLD AUTO: 0.1 THOUSANDS/ΜL (ref 0–0.1)
BASOPHILS NFR BLD AUTO: 1 % (ref 0–1)
BENZODIAZ UR QL: NEGATIVE
BILIRUB SERPL-MCNC: 0.57 MG/DL (ref 0.2–1)
BILIRUB UR QL STRIP: NEGATIVE
BUN SERPL-MCNC: 18 MG/DL (ref 5–25)
CALCIUM SERPL-MCNC: 8.8 MG/DL (ref 8.4–10.2)
CHLORIDE SERPL-SCNC: 106 MMOL/L (ref 96–108)
CHOLEST SERPL-MCNC: 140 MG/DL
CLARITY UR: CLEAR
CO2 SERPL-SCNC: 24 MMOL/L (ref 21–32)
COCAINE UR QL: NEGATIVE
COLOR UR: YELLOW
CREAT SERPL-MCNC: 0.84 MG/DL (ref 0.6–1.3)
EOSINOPHIL # BLD AUTO: 0.37 THOUSAND/ΜL (ref 0–0.61)
EOSINOPHIL NFR BLD AUTO: 3 % (ref 0–6)
ERYTHROCYTE [DISTWIDTH] IN BLOOD BY AUTOMATED COUNT: 13.9 % (ref 11.6–15.1)
FOLATE SERPL-MCNC: 18.2 NG/ML (ref 3.1–17.5)
GFR SERPL CREATININE-BSD FRML MDRD: 92 ML/MIN/1.73SQ M
GLUCOSE P FAST SERPL-MCNC: 119 MG/DL (ref 65–99)
GLUCOSE SERPL-MCNC: 119 MG/DL (ref 65–140)
GLUCOSE UR STRIP-MCNC: NEGATIVE MG/DL
HCT VFR BLD AUTO: 42.2 % (ref 36.5–49.3)
HDLC SERPL-MCNC: 33 MG/DL
HGB BLD-MCNC: 14.2 G/DL (ref 12–17)
HGB UR QL STRIP.AUTO: NEGATIVE
IMM GRANULOCYTES # BLD AUTO: 0.06 THOUSAND/UL (ref 0–0.2)
IMM GRANULOCYTES NFR BLD AUTO: 1 % (ref 0–2)
KETONES UR STRIP-MCNC: NEGATIVE MG/DL
LDLC SERPL CALC-MCNC: 72 MG/DL (ref 0–100)
LEUKOCYTE ESTERASE UR QL STRIP: NEGATIVE
LYMPHOCYTES # BLD AUTO: 2.76 THOUSANDS/ΜL (ref 0.6–4.47)
LYMPHOCYTES NFR BLD AUTO: 22 % (ref 14–44)
MCH RBC QN AUTO: 32.3 PG (ref 26.8–34.3)
MCHC RBC AUTO-ENTMCNC: 33.6 G/DL (ref 31.4–37.4)
MCV RBC AUTO: 96 FL (ref 82–98)
METHADONE UR QL: NEGATIVE
MONOCYTES # BLD AUTO: 1.27 THOUSAND/ΜL (ref 0.17–1.22)
MONOCYTES NFR BLD AUTO: 10 % (ref 4–12)
NEUTROPHILS # BLD AUTO: 7.78 THOUSANDS/ΜL (ref 1.85–7.62)
NEUTS SEG NFR BLD AUTO: 63 % (ref 43–75)
NITRITE UR QL STRIP: NEGATIVE
NON-SQ EPI CELLS URNS QL MICRO: NORMAL /HPF
NRBC BLD AUTO-RTO: 0 /100 WBCS
OPIATES UR QL SCN: NEGATIVE
OXYCODONE+OXYMORPHONE UR QL SCN: NEGATIVE
PCP UR QL: NEGATIVE
PH UR STRIP.AUTO: 6 [PH]
PLATELET # BLD AUTO: 228 THOUSANDS/UL (ref 149–390)
PMV BLD AUTO: 9.8 FL (ref 8.9–12.7)
POTASSIUM SERPL-SCNC: 4.1 MMOL/L (ref 3.5–5.3)
PROT SERPL-MCNC: 6.7 G/DL (ref 6.4–8.4)
PROT UR STRIP-MCNC: ABNORMAL MG/DL
RBC # BLD AUTO: 4.39 MILLION/UL (ref 3.88–5.62)
RBC #/AREA URNS AUTO: NORMAL /HPF
SODIUM SERPL-SCNC: 137 MMOL/L (ref 135–147)
SP GR UR STRIP.AUTO: 1.02 (ref 1–1.03)
THC UR QL: NEGATIVE
TRIGL SERPL-MCNC: 176 MG/DL
UROBILINOGEN UR STRIP-ACNC: <2 MG/DL
VIT B12 SERPL-MCNC: 864 PG/ML (ref 100–900)
WBC # BLD AUTO: 12.34 THOUSAND/UL (ref 4.31–10.16)
WBC #/AREA URNS AUTO: NORMAL /HPF

## 2023-04-17 RX ORDER — HYDROMORPHONE HCL/PF 1 MG/ML
1 SYRINGE (ML) INJECTION ONCE AS NEEDED
Status: DISCONTINUED | OUTPATIENT
Start: 2023-04-17 | End: 2023-04-18 | Stop reason: HOSPADM

## 2023-04-17 RX ORDER — OXYCODONE HYDROCHLORIDE 5 MG/1
10 TABLET ORAL EVERY 6 HOURS PRN
Status: DISCONTINUED | OUTPATIENT
Start: 2023-04-17 | End: 2023-04-17

## 2023-04-17 RX ORDER — LABETALOL HYDROCHLORIDE 5 MG/ML
10 INJECTION, SOLUTION INTRAVENOUS EVERY 6 HOURS PRN
Status: DISCONTINUED | OUTPATIENT
Start: 2023-04-17 | End: 2023-04-17

## 2023-04-17 RX ORDER — AMLODIPINE BESYLATE 2.5 MG/1
2.5 TABLET ORAL DAILY
Status: DISCONTINUED | OUTPATIENT
Start: 2023-04-17 | End: 2023-04-17

## 2023-04-17 RX ORDER — AMLODIPINE BESYLATE 2.5 MG/1
2.5 TABLET ORAL DAILY
Status: DISCONTINUED | OUTPATIENT
Start: 2023-04-17 | End: 2023-04-18 | Stop reason: HOSPADM

## 2023-04-17 RX ORDER — METOPROLOL SUCCINATE 50 MG/1
100 TABLET, EXTENDED RELEASE ORAL DAILY
Status: DISCONTINUED | OUTPATIENT
Start: 2023-04-17 | End: 2023-04-18 | Stop reason: HOSPADM

## 2023-04-17 RX ORDER — HYDRALAZINE HYDROCHLORIDE 20 MG/ML
5 INJECTION INTRAMUSCULAR; INTRAVENOUS EVERY 6 HOURS PRN
Status: DISCONTINUED | OUTPATIENT
Start: 2023-04-17 | End: 2023-04-18 | Stop reason: HOSPADM

## 2023-04-17 RX ORDER — AMLODIPINE BESYLATE 5 MG/1
10 TABLET ORAL DAILY
Status: DISCONTINUED | OUTPATIENT
Start: 2023-04-17 | End: 2023-04-17

## 2023-04-17 RX ORDER — AMLODIPINE BESYLATE 5 MG/1
5 TABLET ORAL DAILY
Status: DISCONTINUED | OUTPATIENT
Start: 2023-04-17 | End: 2023-04-17

## 2023-04-17 RX ADMIN — LIDOCAINE 2 PATCH: 50 PATCH TOPICAL at 08:52

## 2023-04-17 RX ADMIN — CITALOPRAM HYDROBROMIDE 20 MG: 20 TABLET ORAL at 08:53

## 2023-04-17 RX ADMIN — METOPROLOL SUCCINATE 100 MG: 50 TABLET, EXTENDED RELEASE ORAL at 16:31

## 2023-04-17 RX ADMIN — TRAMADOL HYDROCHLORIDE 50 MG: 50 TABLET ORAL at 13:01

## 2023-04-17 RX ADMIN — ASPIRIN 81 MG: 81 TABLET, COATED ORAL at 12:57

## 2023-04-17 RX ADMIN — AMLODIPINE BESYLATE 2.5 MG: 2.5 TABLET ORAL at 16:31

## 2023-04-17 RX ADMIN — PANTOPRAZOLE SODIUM 20 MG: 20 TABLET, DELAYED RELEASE ORAL at 05:57

## 2023-04-17 RX ADMIN — ATORVASTATIN CALCIUM 80 MG: 40 TABLET, FILM COATED ORAL at 20:42

## 2023-04-17 RX ADMIN — IOHEXOL 100 ML: 350 INJECTION, SOLUTION INTRAVENOUS at 16:07

## 2023-04-17 RX ADMIN — LABETALOL HYDROCHLORIDE 10 MG: 5 INJECTION, SOLUTION INTRAVENOUS at 08:54

## 2023-04-17 RX ADMIN — CLOPIDOGREL BISULFATE 75 MG: 75 TABLET, FILM COATED ORAL at 12:57

## 2023-04-17 RX ADMIN — DONEPEZIL HYDROCHLORIDE 5 MG: 5 TABLET ORAL at 22:28

## 2023-04-17 RX ADMIN — DICLOFENAC SODIUM 4 G: 10 GEL TOPICAL at 12:57

## 2023-04-17 NOTE — QUICK NOTE
MRI brain images and reports reviewed in PACs, evidence of scattered acute ischemic infarcts in L posterior MCA territory indicative of recurrent embolic disease  Discussed with patient at bedside and reviewed results of MRI brain and also discussed with attending neurologist      Plan for additional work-up including CT C/A/P with contrast to evaluate for underlying malignancy as well as thrombosis panel  Given patient with ESUS x 2 and compliant with antiplatelet, recommend empiric anticoagulation with Eliquis beginning on Wednesday April 19, 2023  Patient expressed understanding  Offered to contact patient's wife to update her, but patient declined and noted that he would prefer to call her himself  Plan of care reviewed with internal medicine team as well

## 2023-04-17 NOTE — ED PROVIDER NOTES
"History  Chief Complaint   Patient presents with   • Hypertension     Presents via EMS for c/o \"feeling off\" since yesterday after he started new meds for back pain yesterday  A&O x 4 upon arrival  Residual left side weakness from previous stroke  On plavix  Denies fall or injury  This is a 71-year-old male who presents via ambulance from home for evaluation of confusion and just feeling off prior to arrival   Denies any loss of vision or difficulty with speech no unilateral numbness or weakness  Patient does have a prior history of stroke with left-sided weakness and is currently on Plavix  Denies any recent trauma  Blood sugar prior to arrival was normal   He was found to be hypertensive by ambulance crew at 210/100  [place] Coma Scale currently is 15 NIH stroke scale of 0  Patient did recently start an anti-inflammatory and muscle relaxant for low back pain  History provided by:  Patient and EMS personnel  Medical Problem  Location:  Generalized  Quality:  Confusion  Severity:  Moderate  Onset quality:  Unable to specify  Progression:  Improving  Chronicity:  New  Context:  Period of confusion  Worsened by:  Hypertension  Associated symptoms: no chest pain, no loss of consciousness and no shortness of breath        Prior to Admission Medications   Prescriptions Last Dose Informant Patient Reported? Taking?    Melatonin 10 MG TABS   Yes No   Sig: Take 5 mg by mouth as needed Takes 2 tabs as needed   TOPROL  MG 24 hr tablet   Yes No   Sig: Take 100 mg by mouth daily    Patient not taking: Reported on 1/20/2023   amLODIPine (NORVASC) 2 5 mg tablet   Yes No   Sig: Take 2 5 mg by mouth daily   atorvastatin (LIPITOR) 40 mg tablet   Yes No   Sig: Take 80 mg by mouth daily   citalopram (CeleXA) 10 mg tablet   Yes No   Sig: Take 20 mg by mouth daily   clopidogrel (PLAVIX) 75 mg tablet   No No   Sig: Take 1 tablet (75 mg total) by mouth daily   Patient taking differently: Take 80 mg by mouth daily " donepezil (ARICEPT) 5 mg tablet   No No   Sig: TAKE 1 TABLET TWICE A DAY   fluticasone (FLONASE) 50 mcg/act nasal spray   No No   Si spray into each nostril daily   metFORMIN (GLUCOPHAGE) 500 mg tablet   No No   Sig: Take 1 tablet (500 mg total) by mouth daily with breakfast   omeprazole (PriLOSEC OTC) 20 MG tablet   Yes No   Sig: Take 20 mg by mouth daily   sildenafil (REVATIO) 20 mg tablet   Yes No   Sig: TAKE 2 3 TABLETS BY MOUTH AS NEEDED FOR SEXUAL ACTIVITY   tamsulosin (FLOMAX) 0 4 mg   Yes No   Sig: Take 0 4 mg by mouth daily with dinner      Facility-Administered Medications: None       Past Medical History:   Diagnosis Date   • Diabetes mellitus ([facility] [address] )    • Dyslipidemia 3/26/2019   • Hypertension    • Stroke [facility])        Past Surgical History:   Procedure Laterality Date   • BACK SURGERY     • IR STROKE ALERT  3/19/2019   • SHOULDER SURGERY         Family History   Problem Relation Age of Onset   • Heart attack Mother    • Diabetes Mother    • Prostate cancer Father      I have reviewed and agree with the history as documented  E-Cigarette/Vaping   • E-Cigarette Use Current Some Day User      E-Cigarette/Vaping Substances   • Nicotine No    • THC No    • CBD No    • Flavoring No    • Other No    • Unknown No      Social History     Tobacco Use   • Smoking status: Former   • Smokeless tobacco: Never   Vaping Use   • Vaping Use: Some days   Substance Use Topics   • Alcohol use: Not Currently   • Drug use: Never       Review of Systems   Respiratory: Negative for shortness of breath  Cardiovascular: Negative for chest pain  Neurological: Negative for loss of consciousness  Transient confusion   All other systems reviewed and are negative  Physical Exam  Physical Exam  Vitals and nursing note reviewed  Constitutional:       General: He is not in acute distress  Appearance: He is not ill-appearing, toxic-appearing or diaphoretic  HENT:      Head: Normocephalic and atraumatic  Right Ear: Tympanic membrane, ear canal and external ear normal       Left Ear: Tympanic membrane, ear canal and external ear normal    Eyes:      General: No scleral icterus  Right eye: No discharge  Left eye: No discharge  Extraocular Movements: Extraocular movements intact  Pupils: Pupils are equal, round, and reactive to light  Cardiovascular:      Rate and Rhythm: Normal rate and regular rhythm  Pulses: Normal pulses  Heart sounds: No murmur heard  No friction rub  No gallop  Pulmonary:      Effort: Pulmonary effort is normal  No respiratory distress  Breath sounds: No stridor  No wheezing, rhonchi or rales  Abdominal:      General: There is no distension  Palpations: Abdomen is soft  Tenderness: There is no abdominal tenderness  There is no guarding or rebound  Musculoskeletal:         General: No swelling, tenderness, deformity or signs of injury  Normal range of motion  Cervical back: Normal range of motion and neck supple  No rigidity or tenderness  Right lower leg: No edema  Left lower leg: No edema  Skin:     General: Skin is warm and dry  Coloration: Skin is not jaundiced  Findings: No bruising, erythema or rash  Neurological:      General: No focal deficit present  Mental Status: He is alert and oriented to person, place, and time  Cranial Nerves: No cranial nerve deficit  Sensory: No sensory deficit  Motor: No weakness  Coordination: Coordination normal    Psychiatric:         Mood and Affect: Mood normal          Behavior: Behavior normal          Thought Content:  Thought content normal          Vital Signs  ED Triage Vitals [04/16/23 2020]   Temperature Pulse Respirations Blood Pressure SpO2   98 6 °F (37 °C) 93 18 (!) 237/102 100 %      Temp Source Heart Rate Source Patient Position - Orthostatic VS BP Location FiO2 (%)   Oral Monitor Lying Left arm --      Pain Score       9 Vitals:    04/16/23 2155 04/16/23 2202 04/16/23 2215 04/16/23 2233   BP: (!) 199/99 163/78 (!) 179/70 (!) 179/95   Pulse: 82  85 80   Patient Position - Orthostatic VS: Lying   Lying         Visual Acuity  Visual Acuity    Flowsheet Row Most Recent Value   L Pupil Size (mm) 3   R Pupil Size (mm) 3          ED Medications  Medications   labetalol (NORMODYNE) injection 10 mg (10 mg Intravenous Given 4/16/23 2116)   iohexol (OMNIPAQUE) 350 MG/ML injection (SINGLE-DOSE) 100 mL (85 mL Intravenous Given 4/16/23 2148)   aspirin tablet 325 mg (325 mg Oral Given 4/16/23 2229)   clopidogrel (PLAVIX) tablet 300 mg (300 mg Oral Given 4/16/23 2229)       Diagnostic Studies  Results Reviewed     Procedure Component Value Units Date/Time    HS Troponin I 2hr [[id number]] Collected: 04/16/23 2241    Lab Status:  In process Specimen: Blood Updated: 04/16/23 2241    HS Troponin 0hr (reflex protocol) [[id number]]  (Normal) Collected: 04/16/23 2047    Lab Status: Final result Specimen: Blood from Arm, Right Updated: 04/16/23 2110     hs TnI 0hr 21 ng/L     Ethanol [[id number]]  (Normal) Collected: 04/16/23 2028    Lab Status: Final result Specimen: Blood from Arm, Right Updated: 04/16/23 2049     Ethanol Lvl <10 mg/dL     Comprehensive metabolic panel [[id number]] Collected: 04/16/23 2028    Lab Status: Final result Specimen: Blood from Arm, Right Updated: 04/16/23 2048     Sodium 136 mmol/L      Potassium 3 7 mmol/L      Chloride 106 mmol/L      CO2 24 mmol/L      ANION GAP 6 mmol/L      BUN 19 mg/dL      Creatinine 0 83 mg/dL      Glucose 115 mg/dL      Calcium 9 1 mg/dL      AST 20 U/L      ALT 28 U/L      Alkaline Phosphatase 89 U/L      Total Protein 7 4 g/dL      Albumin 4 3 g/dL      Total Bilirubin 0 50 mg/dL      eGFR 92 ml/min/1 73sq m     Narrative:      [place] guidelines for Chronic Kidney Disease (CKD):   •  Stage 1 with normal or high GFR (GFR > 90 mL/min/1 73 square meters)  •  Stage 2 Mild CKD (GFR = 60-89 mL/min/1 73 square meters)  •  Stage 3A Moderate CKD (GFR = 45-59 mL/min/1 73 square meters)  •  Stage 3B Moderate CKD (GFR = 30-44 mL/min/1 73 square meters)  •  Stage 4 Severe CKD (GFR = 15-29 mL/min/1 73 square meters)  •  Stage 5 End Stage CKD (GFR <15 mL/min/1 73 square meters)  Note: GFR calculation is accurate only with a steady state creatinine    Salicylate level [[id number]]  (Normal) Collected: 04/16/23 2028    Lab Status: Final result Specimen: Blood from Arm, Right Updated: 27/92/18 3240     Salicylate Lvl <5 mg/dL     Acetaminophen level-If concentration is detectable, please discuss with medical  on call   [[id number]]  (Abnormal) Collected: 04/16/23 2028    Lab Status: Final result Specimen: Blood from Arm, Right Updated: 04/16/23 2048     Acetaminophen Level <10 ug/mL     Protime-INR [[id number]]  (Normal) Collected: 04/16/23 2028    Lab Status: Final result Specimen: Blood from Arm, Right Updated: 04/16/23 2048     Protime 12 7 seconds      INR 0 89    APTT [[id number]]  (Normal) Collected: 04/16/23 2028    Lab Status: Final result Specimen: Blood from Arm, Right Updated: 04/16/23 2048     PTT 28 seconds     CBC and differential [[id number]]  (Abnormal) Collected: 04/16/23 2028    Lab Status: Final result Specimen: Blood from Arm, Right Updated: 04/16/23 2034     WBC 14 79 Thousand/uL      RBC 4 90 Million/uL      Hemoglobin 15 8 g/dL      Hematocrit 46 3 %      MCV 95 fL      MCH 32 2 pg      MCHC 34 1 g/dL      RDW 13 9 %      MPV 9 6 fL      Platelets 831 Thousands/uL      nRBC 0 /100 WBCs      Neutrophils Relative 62 %      Immat GRANS % 0 %      Lymphocytes Relative 25 %      Monocytes Relative 9 %      Eosinophils Relative 3 %      Basophils Relative 1 %      Neutrophils Absolute 9 10 Thousands/µL      Immature Grans Absolute 0 06 Thousand/uL      Lymphocytes Absolute 3 73 Thousands/µL      Monocytes Absolute 1 36 Thousand/µL      Eosinophils Absolute 0 41 Thousand/µL Basophils Absolute 0 13 Thousands/µL     Rapid drug screen, urine [[id number]]     Lab Status: No result Specimen: Urine     UA w Reflex to Microscopic w Reflex to Culture [[id number]]     Lab Status: No result Specimen: Urine                  CTA stroke alert (head/neck)   Final Result by Carmella Harada, MD (04/16 2233)      Severe stenosis again noted at the distal right M1 segment, likely reflecting partial recanalization from prior thrombotic occlusion  Although the findings are similar to the prior exam, there appears to moderately decreased flow related enhancement of    the distal right MCA branches when compared to the left  An MRI brain is recommended for further evaluation  No evidence of acute thrombus or large vessel occlusion within the major vessels of the [place]  Significant atherosclerotic plaque in the proximal internal carotid arteries, not significantly changed from the prior exam             I personally discussed this study with Dr Rockne Fabry on 4/16/2023 at 10:09 PM          Workstation performed: [id number]         CT stroke alert brain   Final Result by Carmella Harada, MD (04/16 2214)      No acute intracranial abnormality or significant interval change from prior study performed earlier the same day  Stable microangiopathic changes and volume loss  I personally discussed this study with Dr Rockne Fabry on 4/16/2023 at 10:09 PM                   Workstation performed: [id number]         XR chest 1 view portable   ED Interpretation by Azul Goodwin DO (04/16 2101)   No acute infiltrate or congestive heart failure      CT head without contrast   Final Result by Mark Ortiz DO (04/16 2056)      No acute intracranial abnormality or significant interval change from prior study  Chronic microangiopathic changes                    Workstation performed: [id number]                    Procedures  ECG 12 Lead Documentation Only    Date/Time: 4/16/2023 8:44 PM  Performed by: Bruce Solitario DO  Authorized by: Bruce Solitario DO     ECG reviewed by me, the ED Provider: yes    Patient location:  ED  Rate:     ECG rate:  91  Rhythm:     Rhythm: sinus rhythm    Conduction:     Conduction: normal    T waves:     T waves: normal      CriticalCare Time    Date/Time: 4/16/2023 9:51 PM  Performed by: Bruce Solitario DO  Authorized by: Bruce Solitario DO     Critical care provider statement:     Critical care time (minutes):  30    Critical care time was exclusive of:  Separately billable procedures and treating other patients    Critical care was necessary to treat or prevent imminent or life-threatening deterioration of the following conditions:  CNS failure or compromise    Critical care was time spent personally by me on the following activities:  Obtaining history from patient or surrogate, discussions with consultants, evaluation of patient's response to treatment, examination of patient, interpretation of cardiac output measurements, ordering and performing treatments and interventions, ordering and review of laboratory studies, ordering and review of radiographic studies and re-evaluation of patient's condition    I assumed direction of critical care for this patient from another provider in my specialty: no               ED Course  ED Course as of 04/16/23 2244   Sun Apr 16, 2023 2138 Patient developed difficulty with speech with some difficulty finding words and slurred speech  Last known normal here in the emergency room was 2130    Stroke alert was called his blood pressure after labetalol was 168/76   2139 No recent surgery no recent trauma or blood in the stool or urine no contraindications to thrombolytics   2146 Discussed case with neurology Dr Mirta Worley CTA head and neck ordered   2202 Patient neurologically back to normal at this time blood pressure did briefly go back up to 199/90 and currently is 163/78 symptoms may be related to blood pressure and altered perfusion   2220 Discussed case with neurology at this time patient is not a thrombolytic candidate would load with 325 mg of aspirin and 300 mg of Plavix in addition she would like to keep his blood pressure between 354 and 651 systolic and up to 917 diastolic since he is very perfusion dependent             HEART Risk Score    Flowsheet Row Most Recent Value   Heart Score Risk Calculator    History 0 Filed at: 04/16/2023 2229   ECG 0 Filed at: 04/16/2023 2229   Age 1 Filed at: 04/16/2023 2229   Risk Factors 2 Filed at: 04/16/2023 2229   Troponin 1 Filed at: 04/16/2023 2229   HEART Score 4 Filed at: 04/16/2023 [facility]           Stroke Assessment     Row Name 04/16/23 2031 04/16/23 2149          NIH Stroke Scale    Interval Baseline Other (Comment)  Repeat at 2145     Level of Consciousness (1a ) 0 0     LOC Questions (1b ) 0 1     LOC Commands (1c ) 0 0     Best Gaze (2 ) 0 0     Visual (3 ) 0 0     Facial Palsy (4 ) 0 0     Motor Arm, Left (5a ) 0 0     Motor Arm, Right (5b ) 0 0     Motor Leg, Left (6a ) 0 0     Motor Leg, Right (6b ) 0 0     Limb Ataxia (7 ) 0 0     Sensory (8 ) 0 0     Best Language (9 ) 0 1     Dysarthria (10 ) 0 1     Extinction and Inattention (11 ) (Formerly Neglect) 0 0     Total 0 3                           SBIRT 22yo+    Flowsheet Row Most Recent Value   Initial Alcohol Screen: US AUDIT-C     1  How often do you have a drink containing alcohol? 0 Filed at: 04/16/2023 2022   2  How many drinks containing alcohol do you have on a typical day you are drinking? 0 Filed at: 04/16/2023 2022   3a  Male UNDER 65: How often do you have five or more drinks on one occasion? 0 Filed at: 04/16/2023 2022   Audit-C Score 0 Filed at: 04/16/2023 2022   WILLIAM: How many times in the past year have you    Used an illegal drug or used a prescription medication for non-medical reasons?  Never Filed at: 04/16/2023 2022                    Medical Decision Making  Altered mental status differential includes acute CNS pathology versus hypertensive encephalopathy or medication induced work-up in progress including lab work and CAT scan of the head in addition to monitoring blood pressure which may require treatment    Amount and/or Complexity of Data Reviewed  Labs: ordered  Radiology: ordered  Disposition  Final diagnoses:   Stroke [facility])   Hypertensive urgency     Time reflects when diagnosis was documented in both MDM as applicable and the Disposition within this note     Time User Action Codes Description Comment    4/16/2023  9:41 PM Susan Mittal [I63 [id number]] Stroke [facility])     4/16/2023  9:50 PM Anne Marie Shepherd [I16 [id number]] Hypertensive urgency       ED Disposition     ED Disposition   Admit    Condition   Stable    Date/Time   Sun Apr 16, 2023 10:42 PM    Comment   Case was discussed with **ap covering Dr Alo Chen* and the patient's admission status was agreed to be Admission Status: inpatient status to the service of Dr Alo Chen   Follow-up Information    None         Patient's Medications   Discharge Prescriptions    No medications on file       No discharge procedures on file      PDMP Review       Value Time User    PDMP Reviewed  Yes 1/13/2023 10:47 AM Kenneth Chen MD          ED Provider  Electronically Signed by           Travis Carrillo DO  04/16/23 5604

## 2023-04-17 NOTE — ASSESSMENT & PLAN NOTE
Lab Results   Component Value Date    HGBA1C 5 8 (H) 01/13/2023     · Home regimen of metformin 500mg QD  · A1C pending  · Accuchecks, follow with sliding scale insulin as needed, resume metformin at discharge

## 2023-04-17 NOTE — ASSESSMENT & PLAN NOTE
Lab Results   Component Value Date    HGBA1C 5 8 (H) 01/13/2023       No results for input(s): POCGLU in the last 72 hours      Blood Sugar Average: Last 72 hrs:    - Goal euglycemia    - Recommend check hemoglobin A1c   - Management as per medicine team

## 2023-04-17 NOTE — ASSESSMENT & PLAN NOTE
· Patient endorsing chronic low back pain x30 years, was prior   · States that he recently started taking muscle relaxer's (Robaxin), states he felt confused after taking a dose over last two days  · Lidocaine patch  · Start tramadol  · Voltaren gel  · Ice and aqua K  · Tylenol and Ibuprofen PRN  · outpatient follow up with pain management

## 2023-04-17 NOTE — CONSULTS
Consultation - Neurology   Cristian Santiago 58 y o  male MRN: [id number]  Unit/Bed#: ED 07 Encounter: [id number]      Assessment/Plan     Altered mental status  Assessment & Plan  58year old male with HTN, HLD, DM2, prior R MCA CVA s/p thrombectomy in March 2019, known severe R M1 stenosis, prior tobacco use and chronic low back pain admitted with altered mental status on April 16, 2023 after taking muscle relaxant for low back pain (Robaxin per chart review)  While in the ED, patient was noted to develop aphasia and stroke alert was activated  NIHSS 5 at time of alert per review of overnight neurology stroke alert note  CTH completed and demonstrated no acute intracranial abnormality  CTA head and neck completed and demonstrated severe stenosis at the distal R M1 segment similar to prior CTA completed January 12, 2023, but with moderately decreased flow related enhancement of the distal right MCA branches when compared to the left  Per review of chart, NIHSS 0 after scans were completed  Patient was not an IV thrombolytics candidate due to NIHSS 0 and symptoms had resolved and no IR target identified on CTA  Patient was given loading doses of  mg and Plavix 300 mg and admitted for further work-up  Plan:   - Recommend stroke pathway  - Check MRI brain without contrast    - Check echocardiogram    - Monitor on telemetry  - Check fasting lipid panel and hemoglobin A1c    - Continue on ASA 81 mg QD and Plavix 75 mg QD    - Continue on atorvastatin 80 mg QPM    - Goal normothermia and euglycemia  - Allow for permissive hypertension with BP goal <220/110  Suspect aphasia secondary to drop in BP while in the ED, patient was profoundly hypertensive on admission to the ED with /102 and then became symptomatic around 2134 with /76   This morning SBP was in 140s when patient was seen and examined and did not have symptoms so suspect that symptoms were due to the drop in BP as opposed to patient being perfusion dependent    - PT/OT   - Stroke education    - Monitor exam and notify with changes  Middle cerebral artery stenosis, right  Assessment & Plan  - Severe R M1 stenosis noted with moderately decreased flow related enhancement of the distal R MCA branches when compared to the left  Chronic ischemic right MCA stroke  Assessment & Plan  - R MCA stroke in March 2019 s/p thrombectomy  - Maintained on Plavix 75 mg QD and atorvastatin 80 mg QPM        Primary hypertension  Assessment & Plan  - Patient with elevated BP, 237/102 on admission    - Allow for permissive hypertension with BP goal <220/110  Given concern for perfusion dependent state, SBP goal >180 recommended by overnight neurology on call  - Continue to monitor symptoms and BP closely  Please notify neurology team of any change in exam     Type 2 diabetes mellitus, without long-term current use of insulin (HCC)  Assessment & Plan  Lab Results   Component Value Date    HGBA1C 5 8 (H) 01/13/2023       No results for input(s): POCGLU in the last 72 hours  Blood Sugar Average: Last 72 hrs:    - Goal euglycemia    - Recommend check hemoglobin A1c   - Management as per medicine team       Chronic low back pain  Assessment & Plan  - Patient notes history of low back pain x 30 years  - Patient reports taking muscle relaxant (Robaxin per chart review) yesterday for the pain and symptoms began shortly after taking this  - Patient notes he is trying to schedule outpatient pain management evaluation as he has seen them in the past for this and underwent STEPHANIE with relief of the pain  He notes most recent injection was about 10 years ago  Recommendations for outpatient neurological follow up have yet to be determined      History of Present Illness     Reason for Consult / Principal Problem: Stroke  HPI: Ian Scott is a 58 y o   male with HTN, HLD, DM, prior R MCA stroke s/p thrombectomy in March 2019 , prior tobacco use who "presented to the hospital with altered mental status  To review, patient was seen by inpatient neurology team most recently on January 13, 2023  At that time, he presented to the ED complaining of \"brain fog\" which began shortly after he had woken up on the day of admission  He reported that he was forgetting why he was going into certain rooms and had forgotten to take his medications  He was noted to have BP elevated to 196/88 on admission per review of chart  He underwent CTA head and neck which demonstrated stable severe stenosis in the distal R M1 segment and significant atherosclerotic plaque in the proximal internal carotid arteries as well as MRI brain which demonstrated multiple stable chronic small infarcts in the right frontoparietal corona radiata, left periatrial white matter and left cerebellum with severe chronic microangiopathy  Symptoms were felt to be secondary to hypertensive encephalopathy but nocturnal seizure could not entirely be excluded  Routine EEG was ordered and patient was instructed to get a BP cuff to monitor his BPs at home  He was seen in the outpatient neurology office on January 20, 2023 in follow-up  At that time, patient was noted to be going well  He was again encouraged to get a home BP cuff to monitor his BP and also advised to obtain routine EEG and to continue on Plavix and Lipitor for secondary stroke prevention  Patient presented to the hospital on April 16, 2023 with complaint of \"feeling off\" after starting new medications for back pain, including Robaxin  He was noted to be hypertensive with EMS, /100 per chart review and BP was 237/102 on admission  While in the ED, he developed difficulty finding words and slurred speech   Stroke alert was activated and patient underwent CTA head and neck which demonstrated severe stenosis at the distal R M1 segment and moderately decreased flow related enhancement of the distal R MCA branches when compared to the left " "as well as stable significant atherosclerotic plaque in the proximal internal carotid arteries as well as CTH which demonstrated stable microangiopathic changes and volume loss  Neurology team responded immediately  Patient was deemed not a candidate for IV thrombolytics as symptoms had resolved during alert/imaging  He was given loading doses of  mg and Plavix 300 mg and recommended to maintain permissive hypertension as it appeared patient was symptomatic when BP dropped below 170s  Patient seen and examined at bedside and he reports that he overall is feeling back to his baseline  He notes that the only thing that continues to feel different from his baseline is low back pain  He notes that yesterday, he took a muscle relaxant that ws prescribed for his low back pain and that after that, he notes that he felt confused and \"out of it\" for the entire day  He notes prior to taking the medication, he had felt well and at his baseline after he woke up and only felt symptomatic after he took the muscle relaxant  He notes that he has been dealing with low back pain which radiates into his RLE x 30 years and notes that it overall is well-controlled but he has been having more pain over the past several weeks  He notes he has previously followed with pain management for the pain and underwent an injection about 10 years ago and is awaiting a return appointment with them  He notes that the pain radiates into his RLE and he notes that he has difficulty walking longer distances due to the pain  Patient notes that his speech changed while in the ED and that has occurred about 3 times in the past related to the stroke he had in 2019  He notes that he feels his speech has returned to baseline and that he has not had further recurrence of speech difficulty overnight       Consult to Neurology  Consult performed by: Chary Do PA-C  Consult ordered by: Uzma Henderson, DO          Review of Systems " Constitutional: Negative for chills, fatigue and fever  HENT: Negative for trouble swallowing  Eyes: Negative for visual disturbance  Respiratory: Negative for shortness of breath  Cardiovascular: Negative for chest pain and palpitations  Gastrointestinal: Negative for abdominal pain, nausea and vomiting  Genitourinary: Negative for difficulty urinating and dysuria  Musculoskeletal: Positive for back pain and gait problem  Negative for neck pain  Skin: Negative for rash  Neurological: Positive for weakness  Negative for dizziness, speech difficulty, light-headedness, numbness and headaches  Psychiatric/Behavioral: Negative for confusion  Historical Information   Past Medical History:   Diagnosis Date   • Diabetes mellitus ([facility] [address] )    • Dyslipidemia 3/26/2019   • Hypertension    • Stroke [facility])      Past Surgical History:   Procedure Laterality Date   • BACK SURGERY     • IR STROKE ALERT  3/19/2019   • SHOULDER SURGERY       Social History   Social History     Substance and Sexual Activity   Alcohol Use Not Currently     Social History     Substance and Sexual Activity   Drug Use Never     E-Cigarette/Vaping   • E-Cigarette Use Current Some Day User      E-Cigarette/Vaping Substances   • Nicotine No    • THC No    • CBD No    • Flavoring No    • Other No    • Unknown No      Social History     Tobacco Use   Smoking Status Former   Smokeless Tobacco Never     Family History:   Family History   Problem Relation Age of Onset   • Heart attack Mother    • Diabetes Mother    • Prostate cancer Father        Review of previous medical records was completed  Please see HPI      Meds/Allergies   Scheduled Meds:  Current Facility-Administered Medications   Medication Dose Route Frequency Provider Last Rate   • acetaminophen  650 mg Oral Q6H PRN ERIC David     • aspirin  81 mg Oral Daily [place]v [place]     • atorvastatin  80 mg Oral After [employer], "CRNP     • citalopram  20 mg Oral Daily ERIC Bearden     • clopidogrel  75 mg Oral Daily Lonny [place]     • donepezil  5 mg Oral BID Lonny [place]     • enoxaparin  40 mg Subcutaneous Daily Lonny Khalil [place], KARSONNP     • ibuprofen  600 mg Oral Q6H PRN ERIC Bearden     • labetalol  10 mg Intravenous Q6H PRN Lang Walsh MD     • lidocaine  2 patch Topical Daily ERIC Bearden     • ondansetron  4 mg Intravenous Q6H PRN ERIC Bearden     • pantoprazole  20 mg Oral Early Morning ERIC Bearden     • tamsulosin  0 4 mg Oral Daily With ERIC CASTRO     • traMADol  50 mg Oral Q6H PRN Lonny ERIC Frank       Continuous Infusions:   PRN Meds: •  acetaminophen  •  ibuprofen  •  labetalol  •  ondansetron  •  traMADol      Allergies   Allergen Reactions   • Lisinopril Cough       Objective   Vitals:Blood pressure (!) 178/73, pulse 85, temperature 98 6 °F (37 °C), temperature source Oral, resp  rate 16, height 5' 8\" (1 727 m), weight 108 kg (237 lb 10 5 oz), SpO2 100 %  ,Body mass index is 36 14 kg/m²  Intake/Output Summary (Last 24 hours) at 4/17/2023 0722  Last data filed at 4/17/2023 8579  Gross per 24 hour   Intake --   Output 600 ml   Net -600 ml       Invasive Devices: Invasive Devices     Peripheral Intravenous Line  Duration           Peripheral IV 04/16/23 Right;Ventral (anterior) Hand 1 day    Peripheral IV 04/16/23 Right Antecubital <1 day                Physical Exam  Constitutional:       General: He is not in acute distress  Appearance: Normal appearance  He is not ill-appearing, toxic-appearing or diaphoretic  HENT:      Head: Normocephalic and atraumatic  Mouth/Throat:      Mouth: Mucous membranes are moist       Pharynx: Oropharynx is clear  No oropharyngeal exudate or posterior oropharyngeal erythema  Eyes:      General: No scleral icterus          " "Right eye: No discharge  Left eye: No discharge  Extraocular Movements: Extraocular movements intact  Conjunctiva/sclera: Conjunctivae normal       Pupils: Pupils are equal, round, and reactive to light  Pulmonary:      Effort: Pulmonary effort is normal  No respiratory distress  Breath sounds: Normal breath sounds  Abdominal:      General: There is no distension  Palpations: Abdomen is soft  Tenderness: There is no abdominal tenderness  Musculoskeletal:      Cervical back: Normal range of motion and neck supple  Right lower leg: No edema  Left lower leg: No edema  Comments: Diminished ROM L shoulder     Skin:     General: Skin is warm and dry  Findings: No erythema or rash  Neurological:      Mental Status: He is alert and oriented to person, place, and time  Coordination: Finger-Nose-Finger Test and Heel to Michael Figures Test normal       Deep Tendon Reflexes:      Reflex Scores:       Bicep reflexes are 2+ on the right side and 2+ on the left side  Brachioradialis reflexes are 2+ on the right side and 2+ on the left side  Patellar reflexes are 1+ on the right side and 2+ on the left side  Achilles reflexes are 1+ on the right side and 1+ on the left side  Psychiatric:         Mood and Affect: Mood normal          Speech: Speech normal          Behavior: Behavior normal        Neurologic Exam     Mental Status   Oriented to person, place, and time  Oriented to person  Oriented to place  Oriented to time  Attention: normal  Concentration: normal    Speech: speech is normal   Level of consciousness: alert  Knowledge: good  Able to name object  Able to repeat  Normal comprehension  Patient able to name objects, repeat phrases  He is able to complete simple calculations and is able to spell word \"world\" forward but not backward  No aphasia noted on exam this morning       Cranial Nerves     CN II   Right visual field deficit: " none  Left visual field deficit: none     CN III, IV, VI   Pupils are equal, round, and reactive to light  Right pupil: Size: 3 mm  Shape: regular  Reactivity: brisk  Consensual response: intact  Left pupil: Size: 3 mm  Shape: regular  Reactivity: brisk  Consensual response: intact  Nystagmus: none   Diplopia: none  Ophthalmoparesis: none  Upgaze: normal  Downgaze: normal  Conjugate gaze: present    CN V   Right facial sensation deficit: none  Left facial sensation deficit: none    CN VII   Right facial weakness: none  Left facial weakness: none    CN VIII   Hearing: intact    CN IX, X   Palate: symmetric    CN XI   Right sternocleidomastoid strength: normal  Left sternocleidomastoid strength: normal    CN XII   Tongue: not atrophic  Fasciculations: absent  Tongue deviation: none    Motor Exam   Muscle bulk: normal  Overall muscle tone: normal  Right arm tone: normal  Left arm tone: normal  Right leg tone: normal  Left leg tone: normalMotor strength RUE and B/L LE 5/5, LUE 4/5 throughout  Sensory Exam   Right arm light touch: normal  Left arm light touch: normal  Right leg light touch: normal  Left leg light touch: normal  Right arm vibration: normal  Left arm vibration: normal  Right leg vibration: normal  Left leg vibration: normal  Sensation intact to temperature B/L UE and LE       Gait, Coordination, and Reflexes     Gait  Gait: (Deferred for safety )    Coordination   Finger to nose coordination: normal  Heel to shin coordination: normal    Tremor   Resting tremor: absent  Intention tremor: absent  Action tremor: absent    Reflexes   Right brachioradialis: 2+  Left brachioradialis: 2+  Right biceps: 2+  Left biceps: 2+  Right patellar: 1+  Left patellar: 2+  Right achilles: 1+  Left achilles: 1+  Right plantar: normal  Left plantar: normal      Lab Results:  Recent Results (from the past 24 hour(s))   CBC and differential    Collection Time: 04/16/23  8:28 PM   Result Value Ref Range    WBC 14 79 (H) 4 31 - 10 16 Thousand/uL    RBC 4 90 3 88 - 5 62 Million/uL    Hemoglobin 15 8 12 0 - 17 0 g/dL    Hematocrit 46 3 36 5 - 49 3 %    MCV 95 82 - 98 fL    MCH 32 2 26 8 - 34 3 pg    MCHC 34 1 31 4 - 37 4 g/dL    RDW 13 9 11 6 - 15 1 %    MPV 9 6 8 9 - 12 7 fL    Platelets 221 129 - 560 Thousands/uL    nRBC 0 /100 WBCs    Neutrophils Relative 62 43 - 75 %    Immat GRANS % 0 0 - 2 %    Lymphocytes Relative 25 14 - 44 %    Monocytes Relative 9 4 - 12 %    Eosinophils Relative 3 0 - 6 %    Basophils Relative 1 0 - 1 %    Neutrophils Absolute 9 10 (H) 1 85 - 7 62 Thousands/µL    Immature Grans Absolute 0 06 0 00 - 0 20 Thousand/uL    Lymphocytes Absolute 3 73 0 60 - 4 47 Thousands/µL    Monocytes Absolute 1 36 (H) 0 17 - 1 22 Thousand/µL    Eosinophils Absolute 0 41 0 00 - 0 61 Thousand/µL    Basophils Absolute 0 13 (H) 0 00 - 0 10 Thousands/µL   Protime-INR    Collection Time: 04/16/23  8:28 PM   Result Value Ref Range    Protime 12 7 11 6 - 14 5 seconds    INR 0 89 0 84 - 1 19   APTT    Collection Time: 04/16/23  8:28 PM   Result Value Ref Range    PTT 28 23 - 37 seconds   Comprehensive metabolic panel    Collection Time: 04/16/23  8:28 PM   Result Value Ref Range    Sodium 136 135 - 147 mmol/L    Potassium 3 7 3 5 - 5 3 mmol/L    Chloride 106 96 - 108 mmol/L    CO2 24 21 - 32 mmol/L    ANION GAP 6 4 - 13 mmol/L    BUN 19 5 - 25 mg/dL    Creatinine 0 83 0 60 - 1 30 mg/dL    Glucose 115 65 - 140 mg/dL    Calcium 9 1 8 4 - 10 2 mg/dL    AST 20 13 - 39 U/L    ALT 28 7 - 52 U/L    Alkaline Phosphatase 89 34 - 104 U/L    Total Protein 7 4 6 4 - 8 4 g/dL    Albumin 4 3 3 5 - 5 0 g/dL    Total Bilirubin 0 50 0 20 - 1 00 mg/dL    eGFR 92 ml/min/1 73sq m   Ethanol    Collection Time: 04/16/23  8:28 PM   Result Value Ref Range    Ethanol Lvl <28 <01 mg/dL   Salicylate level    Collection Time: 04/16/23  8:28 PM   Result Value Ref Range    Salicylate Lvl <5 3 - 20 mg/dL   Acetaminophen level-If concentration is detectable, please "discuss with medical  on call      Collection Time: 04/16/23  8:28 PM   Result Value Ref Range    Acetaminophen Level <10 (L) 10 - 20 ug/mL   ECG 12 lead    Collection Time: 04/16/23  8:41 PM   Result Value Ref Range    Ventricular Rate 91 BPM    Atrial Rate 91 BPM    ND Interval 128 ms    QRSD Interval 94 ms    QT Interval 368 ms    QTC Interval 452 ms    P [place] 43 degrees    QRS Axis 36 degrees    T Wave [place] 75 degrees   HS Troponin 0hr (reflex protocol)    Collection Time: 04/16/23  8:47 PM   Result Value Ref Range    hs TnI 0hr 21 \"Refer to ACS Flowchart\"- see link ng/L   HS Troponin I 2hr    Collection Time: 04/16/23 10:41 PM   Result Value Ref Range    hs TnI 2hr 17 \"Refer to ACS Flowchart\"- see link ng/L    Delta 2hr hsTnI -4 <20 ng/L   UA w Reflex to Microscopic w Reflex to Culture    Collection Time: 04/17/23  2:43 AM    Specimen: Urine   Result Value Ref Range    Color, UA Yellow     Clarity, UA Clear     Specific Gravity, UA 1 025 1 005 - 1 030    pH, UA 6 0 4 5, 5 0, 5 5, 6 0, 6 5, 7 0, 7 5, 8 0    Leukocytes, UA Negative Negative    Nitrite, UA Negative Negative    Protein, UA 30 (1+) (A) Negative mg/dl    Glucose, UA Negative Negative mg/dl    Ketones, UA Negative Negative mg/dl    Urobilinogen, UA <2 0 <2 0 mg/dl mg/dl    Bilirubin, UA Negative Negative    Occult Blood, UA Negative Negative   Urine Microscopic    Collection Time: 04/17/23  2:43 AM   Result Value Ref Range    RBC, UA None Seen None Seen, 0-1, 1-2, 2-4, 0-5 /hpf    WBC, UA 0-1 None Seen, 0-1, 1-2, 0-5, 2-4 /hpf    Epithelial Cells None Seen None Seen, Occasional /hpf    Bacteria, UA None Seen None Seen, Occasional /hpf   Rapid drug screen, urine    Collection Time: 04/17/23  2:44 AM   Result Value Ref Range    Amph/Meth UR Negative Negative    Barbiturate Ur Negative Negative    Benzodiazepine Urine Negative Negative    Cocaine Urine Negative Negative    Methadone Urine Negative Negative    Opiate Urine Negative Negative    " PCP Ur Negative Negative    THC Urine Negative Negative    Oxycodone Urine Negative Negative   Comprehensive metabolic panel    Collection Time: 04/17/23  5:57 AM   Result Value Ref Range    Sodium 137 135 - 147 mmol/L    Potassium 4 1 3 5 - 5 3 mmol/L    Chloride 106 96 - 108 mmol/L    CO2 24 21 - 32 mmol/L    ANION GAP 7 4 - 13 mmol/L    BUN 18 5 - 25 mg/dL    Creatinine 0 84 0 60 - 1 30 mg/dL    Glucose 119 65 - 140 mg/dL    Glucose, Fasting 119 (H) 65 - 99 mg/dL    Calcium 8 8 8 4 - 10 2 mg/dL    AST 19 13 - 39 U/L    ALT 22 7 - 52 U/L    Alkaline Phosphatase 75 34 - 104 U/L    Total Protein 6 7 6 4 - 8 4 g/dL    Albumin 3 9 3 5 - 5 0 g/dL    Total Bilirubin 0 57 0 20 - 1 00 mg/dL    eGFR 92 ml/min/1 73sq m   CBC and differential    Collection Time: 04/17/23  5:57 AM   Result Value Ref Range    WBC 12 34 (H) 4 31 - 10 16 Thousand/uL    RBC 4 39 3 88 - 5 62 Million/uL    Hemoglobin 14 2 12 0 - 17 0 g/dL    Hematocrit 42 2 36 5 - 49 3 %    MCV 96 82 - 98 fL    MCH 32 3 26 8 - 34 3 pg    MCHC 33 6 31 4 - 37 4 g/dL    RDW 13 9 11 6 - 15 1 %    MPV 9 8 8 9 - 12 7 fL    Platelets 461 254 - 981 Thousands/uL    nRBC 0 /100 WBCs    Neutrophils Relative 63 43 - 75 %    Immat GRANS % 1 0 - 2 %    Lymphocytes Relative 22 14 - 44 %    Monocytes Relative 10 4 - 12 %    Eosinophils Relative 3 0 - 6 %    Basophils Relative 1 0 - 1 %    Neutrophils Absolute 7 78 (H) 1 85 - 7 62 Thousands/µL    Immature Grans Absolute 0 06 0 00 - 0 20 Thousand/uL    Lymphocytes Absolute 2 76 0 60 - 4 47 Thousands/µL    Monocytes Absolute 1 27 (H) 0 17 - 1 22 Thousand/µL    Eosinophils Absolute 0 37 0 00 - 0 61 Thousand/µL    Basophils Absolute 0 10 0 00 - 0 10 Thousands/µL       Imaging Studies: I have personally reviewed pertinent reports  and I have personally reviewed pertinent films in PACS  CTH- No acute intracranial abnormality or significant interval change from prior study performed earlier the same day   Stable microangiopathic changes and volume loss  CTA head and neck with and without contrast- Severe stenosis again noted at the distal right M1 segment, likely reflecting partial recanalization from prior thrombotic occlusion  Although the findings are similar to the prior exam, there appears to moderately decreased flow related enhancement of the distal right MCA branches when compared to the left  An MRI brain is recommended for further evaluation  No evidence of acute thrombus of large vessel occlusion within the major vessels of the [place]   Significant atherosclerotic plaque in the proximal internal carotid arteries, not significantly changed from the prior exam      VTE Prophylaxis: Enoxaparin (Lovenox)

## 2023-04-17 NOTE — ASSESSMENT & PLAN NOTE
· Patient endorsing chronic low back pain x30 years, was prior   · States that he recently started taking muscle relaxer's (Robaxin), states he felt confused after taking a dose over last two days  · Lidocaine patch  · Start tramadol  · Tylenol and Ibuprofen PRN  · Attempt to orchestrate outpatient follow up with pain management

## 2023-04-17 NOTE — ASSESSMENT & PLAN NOTE
· 4/16 CTA showing: Calcified and noncalcified plaque in the proximal internal carotid artery results in stable moderate (50-69%) stenosis  · Continue Plavix/Statin

## 2023-04-17 NOTE — H&P
[facility] [place]  H&P  Name: Sahil Tinoco 58 y o  male I MRN: [id number]  Unit/Bed#: ED 07 I Date of Admission: 4/16/2023   Date of Service: 4/16/2023 I Hospital Day: 0      Assessment/Plan   Hypertensive encephalopathy, transient  Assessment & Plan  · With recent admission in January 2023 with similar presentation  · Was noted to be confused and dysarthric while in ED post administration of 10mg IV labetolol with 60mmHg drop in SBP  · Stroke alert called, imaging negative for acute CVA, reaffirming right carotid stenosis of 60%  · Wife stating concern that patient having confusion with muscle relaxers, will hold for now  · Will admit for observation overnight  · Follow up neuro consult  · Continue Plavix/Statin  · Careful monitoring of SBP, in setting of 220s on arrival will shoot for goal SBP of 180-200 for now    · Serial neuro exams  · Advised to follow up outpatient EEG study, not yet preformed since neuro follow up in January  · Follow up loop recorder interpretation     Primary hypertension  Assessment & Plan  · Outpatient regimen of Norvasc 2 5mg QD, Toprol 100mg QD  · On arrival to ED with SBPs greater than 200  · Received IV labetalol 10mg x1 with drop in SBPs from 220s to 160s, potentially cause of stroke like symptoms  · Continue prn medications, consider lower dose  · Will continue to trend SBPs per unit routine  · Slowly drop SBPs in setting of dysarthria and confusion post labetalol, goal -200 now, can decrease parameter in AM  · Consider restarting home oral agents in AM    Memory difficulties  Assessment & Plan  · Chronically maintained on aricept 5mg  · Patient states he did not have memory difficulty until taking his Robaxin over last two days  · CT imaging negative  · Continue aricept    Chronic ischemic right MCA stroke  Assessment & Plan  · Noted during 2019 visit, s/p thrombectomy  · Previously maintained on ASA/Plavix/Statin  · Since recent neuro visit only maintained on Plavix/Statin, Continue  · Follow up neuro consult given acute symptoms, however suspect 2/2 blood pressure drop    Stenosis of right carotid artery  Assessment & Plan  · 4/16 CTA showing: Calcified and noncalcified plaque in the proximal internal carotid artery results in stable moderate (50-69%) stenosis  · Continue Plavix/Statin    Type 2 diabetes mellitus, without long-term current use of insulin ([facility])  Assessment & Plan  Lab Results   Component Value Date    HGBA1C 5 8 (H) 01/13/2023     · Home regimen of metformin 500mg QD  · Recheck A1C during admission  · Accuchecks, follow with sliding scale insulin as needed, resume metformin at discharge      GERD (gastroesophageal reflux disease)  Assessment & Plan  · Home regimen of prilosec  · Protonix while inpatient    Urinary retention  Assessment & Plan  · Continue home flomax    Chronic low back pain  Assessment & Plan  · Patient endorsing chronic low back pain x30 years, was prior   · States that he recently started taking muscle relaxer's (Robaxin), states he felt confused after taking a dose over last two days  · Lidocaine patch  · Start tramadol  · Tylenol and Ibuprofen PRN  · Attempt to orchestrate outpatient follow up with pain management    VTE Pharmacologic Prophylaxis:   Moderate Risk (Score 3-4) - Pharmacological DVT Prophylaxis Ordered: enoxaparin (Lovenox)  Code Status: Full Code  Discussion with family: Updated  (wife and son) at bedside  Anticipated Length of Stay: Patient will be admitted on an observation basis with an anticipated length of stay of less than 2 midnights secondary to stroke like symptoms, hypertension      Total Time Spent on Date of Encounter in care of patient: 40 minutes This time was spent on one or more of the following: performing physical exam; counseling and coordination of care; obtaining or reviewing history; documenting in the medical record; reviewing/ordering tests, medications or procedures; communicating with other healthcare professionals and discussing with patient's family/caregivers  Chief Complaint: Stroke like symptoms    History of Present Illness:  Chaitanya Coleman is a 58 y o  male with a PMH of R MCA stroke in 2019, R  Carotid stenosis, HTN HLD DMII who presented to [address] [place] [address] ED 4/16 with hypertensive episodes of greater than 200  Was noted to have SBPs in 220s and labetolol 10mg was given x1  Patient then developed acute confusion and dysarthria, stroke alert called  Imaging was negative for acute CVA, R ICA stenosis read as 60%  Patient quickly returned to his baseline and was not given lytics  Of note patient does have memory issues and is on aricept as an outpatient  Plan to admit to observation for SBP management and neurochecks  Review of Systems:  Review of Systems   Constitutional: Negative  HENT: Negative  Eyes: Negative  Respiratory: Negative for chest tightness and shortness of breath  Cardiovascular: Negative for chest pain  Gastrointestinal: Negative for diarrhea, nausea and vomiting  Endocrine: Negative  Genitourinary: Negative  Musculoskeletal: Positive for back pain  Chronic   Allergic/Immunologic: Negative  Neurological: Negative for facial asymmetry, weakness and headaches  Hematological: Negative  Psychiatric/Behavioral: Negative  Past Medical and Surgical History:   Past Medical History:   Diagnosis Date   • Diabetes mellitus ([facility] [address] )    • Dyslipidemia 3/26/2019   • Hypertension    • Stroke [facility])        Past Surgical History:   Procedure Laterality Date   • BACK SURGERY     • IR STROKE ALERT  3/19/2019   • SHOULDER SURGERY         Meds/Allergies:  Prior to Admission medications    Medication Sig Start Date End Date Taking?  Authorizing Provider   amLODIPine (NORVASC) 2 5 mg tablet Take 2 5 mg by mouth daily    Historical Provider, MD   atorvastatin (LIPITOR) 40 mg tablet Take 80 mg by mouth daily Historical Provider, MD   citalopram (CeleXA) 10 mg tablet Take 20 mg by mouth daily    Historical Provider, MD   clopidogrel (PLAVIX) 75 mg tablet Take 1 tablet (75 mg total) by mouth daily  Patient taking differently: Take 80 mg by mouth daily 4/6/19   Richie Chase MD   donepezil (ARICEPT) 5 mg tablet TAKE 1 TABLET TWICE A DAY 2/7/23   Yamila Carty MD   fluticasone (FLONASE) 50 mcg/act nasal spray 1 spray into each nostril daily 4/6/19   Richie Chase MD   Melatonin 10 MG TABS Take 5 mg by mouth as needed Takes 2 tabs as needed    Historical Provider, MD   metFORMIN (GLUCOPHAGE) 500 mg tablet Take 1 tablet (500 mg total) by mouth daily with breakfast 4/6/19   Richie Chase MD   omeprazole (PriLOSEC OTC) 20 MG tablet Take 20 mg by mouth daily    Historical Provider, MD   sildenafil (REVATIO) 20 mg tablet TAKE 2 3 TABLETS BY MOUTH AS NEEDED FOR SEXUAL ACTIVITY 7/12/19   Historical Provider, MD   tamsulosin (FLOMAX) 0 4 mg Take 0 4 mg by mouth daily with dinner    Historical Provider, MD   TOPROL  MG 24 hr tablet Take 100 mg by mouth daily   Patient not taking: Reported on 1/20/2023 7/31/19   Historical Provider, MD     I have reviewed home medications using recent Epic encounter  Updated by family at beside    Allergies:    Allergies   Allergen Reactions   • Lisinopril Cough       Social History:  Marital Status: /Civil Union   Patient Pre-hospital Living Situation: Home  Patient Pre-hospital Level of Mobility: walks  Patient Pre-hospital Diet Restrictions: N/a  Substance Use History:   Social History     Substance and Sexual Activity   Alcohol Use Not Currently     Social History     Tobacco Use   Smoking Status Former   Smokeless Tobacco Never     Social History     Substance and Sexual Activity   Drug Use Never       Family History:  Family History   Problem Relation Age of Onset   • Heart attack Mother    • Diabetes Mother    • Prostate cancer Father        Physical Exam: "    Vitals:   Blood Pressure: (!) 179/95 (04/16/23 2233)  Pulse: 80 (04/16/23 2233)  Temperature: 98 6 °F (37 °C) (04/16/23 2020)  Temp Source: Oral (04/16/23 2020)  Respirations: 14 (04/16/23 2233)  Height: 5' 8\" (172 7 cm) (04/16/23 2020)  Weight - Scale: 108 kg (237 lb 10 5 oz) (04/16/23 2139)  SpO2: 100 % (04/16/23 2233)    Physical Exam  Vitals and nursing note reviewed  Constitutional:       General: He is not in acute distress  HENT:      Head: Normocephalic and atraumatic  Right Ear: External ear normal       Left Ear: External ear normal       Nose: Nose normal       Mouth/Throat:      Mouth: Mucous membranes are dry  Pharynx: Oropharynx is clear  Eyes:      Pupils: Pupils are equal, round, and reactive to light  Cardiovascular:      Rate and Rhythm: Normal rate and regular rhythm  Pulses: Normal pulses  Heart sounds: Normal heart sounds  Pulmonary:      Effort: Pulmonary effort is normal       Breath sounds: Normal breath sounds  Abdominal:      General: Abdomen is flat  Bowel sounds are normal  There is no distension  Palpations: Abdomen is soft  Musculoskeletal:         General: Normal range of motion  Cervical back: Normal range of motion and neck supple  Skin:     General: Skin is warm and dry  Capillary Refill: Capillary refill takes less than 2 seconds  Neurological:      Mental Status: He is alert and oriented to person, place, and time  GCS: GCS eye subscore is 4  GCS verbal subscore is 5  GCS motor subscore is 6  Comments: NIHSS 0   Psychiatric:         Behavior: Behavior is cooperative          Additional Data:     Lab Results:  Results from last 7 days   Lab Units 04/16/23 2028   WBC Thousand/uL 14 79*   HEMOGLOBIN g/dL 15 8   HEMATOCRIT % 46 3   PLATELETS Thousands/uL 263   NEUTROS PCT % 62   LYMPHS PCT % 25   MONOS PCT % 9   EOS PCT % 3     Results from last 7 days   Lab Units 04/16/23 2028   SODIUM mmol/L 136   POTASSIUM mmol/L " 3  7   CHLORIDE mmol/L 106   CO2 mmol/L 24   BUN mg/dL 19   CREATININE mg/dL 0 83   ANION GAP mmol/L 6   CALCIUM mg/dL 9 1   ALBUMIN g/dL 4 3   TOTAL BILIRUBIN mg/dL 0 50   ALK PHOS U/L 89   ALT U/L 28   AST U/L 20   GLUCOSE RANDOM mg/dL 115     Results from last 7 days   Lab Units 04/16/23 2028   INR  0 89                   Lines/Drains:  Invasive Devices     Peripheral Intravenous Line  Duration           Peripheral IV 04/16/23 Right Antecubital <1 day    Peripheral IV 04/16/23 Right;Ventral (anterior) Hand <1 day              Imaging: Reviewed radiology reports from this admission including: CT head  CTA stroke alert (head/neck)   Final Result by Carmella Harada, MD (04/16 2233)      Severe stenosis again noted at the distal right M1 segment, likely reflecting partial recanalization from prior thrombotic occlusion  Although the findings are similar to the prior exam, there appears to moderately decreased flow related enhancement of    the distal right MCA branches when compared to the left  An MRI brain is recommended for further evaluation  No evidence of acute thrombus or large vessel occlusion within the major vessels of the [place]  Significant atherosclerotic plaque in the proximal internal carotid arteries, not significantly changed from the prior exam             I personally discussed this study with Dr Rockne Fabry on 4/16/2023 at 10:09 PM          Workstation performed: [id number]         CT stroke alert brain   Final Result by Carmella Harada, MD (04/16 2214)      No acute intracranial abnormality or significant interval change from prior study performed earlier the same day  Stable microangiopathic changes and volume loss                     I personally discussed this study with Dr Rockne Fabry on 4/16/2023 at 10:09 PM                   Workstation performed: [id number]         XR chest 1 view portable   ED Interpretation by Azul Goodwin DO (04/16 2101)   No acute infiltrate or congestive heart failure      CT head without contrast   Final Result by Yovanny Lyons DO (04/16 2056)      No acute intracranial abnormality or significant interval change from prior study  Chronic microangiopathic changes  Workstation performed: [id number]             EKG and Other Studies Reviewed on Admission:   · EKG: NSR  HR 90     ** Please Note: This note has been constructed using a voice recognition system   **

## 2023-04-17 NOTE — ASSESSMENT & PLAN NOTE
- Severe R M1 stenosis noted with moderately decreased flow related enhancement of the distal R MCA branches when compared to the left

## 2023-04-17 NOTE — ASSESSMENT & PLAN NOTE
· With recent admission in January 2023 with similar presentation  · Was noted to be confused and dysarthric while in ED post administration of 10mg IV labetolol with 60mmHg drop in SBP  · Stroke alert called, imaging negative for acute CVA, reaffirming right carotid stenosis of 60%  · Wife stating concern that patient having confusion with muscle relaxers, will hold for now  · Will admit for observation overnight  · Follow up neuro consult  · Continue Plavix/Statin  · Careful monitoring of SBP, in setting of 220s on arrival will shoot for goal SBP of 180-200 for now    · Serial neuro exams  · Advised to follow up outpatient EEG study, not yet preformed since neuro follow up in January  · Follow up loop recorder interpretation

## 2023-04-17 NOTE — ED NOTES
Wife came out of room stating her  is not making sense  Upon arrival to room, pt has expressive aphasia  Dr Jack Huffman made aware  En route to bedside        Romaine Kiran RN  04/16/23 [id number]

## 2023-04-17 NOTE — ASSESSMENT & PLAN NOTE
58year old male with HTN, HLD, DM2, prior R MCA CVA s/p thrombectomy in March 2019, known severe R M1 stenosis, prior tobacco use and chronic low back pain admitted with altered mental status on April 16, 2023 after taking muscle relaxant for low back pain (Robaxin per chart review)  While in the ED, patient was noted to develop aphasia and stroke alert was activated  NIHSS 5 at time of alert per review of overnight neurology stroke alert note  Testing completed   CTH completed and demonstrated no acute intracranial abnormality  CTA head and neck completed and demonstrated severe stenosis at the distal R M1 segment similar to prior CTA completed January 12, 2023, but with moderately decreased flow related enhancement of the distal right MCA branches  He has moderate R ICA disease (now 50-60%), asymptomatic  MRI results - evidence of scattered acute ischemic infarcts in L posterior MCA territory indicative of recurrent embolic disease  TTE is pending at this time  Continue on telemetry  He has already had prior work-up including IVY (no PFO) and loop recorder without evidence of Afib  Echo pending  CT of chest abdomen and pelvis with no evidence of malignancy  LDL - 72  Hemoglobin A1 c pending  Thrombosis panel pending  B12 wnl    Etiology of stroke is unclear at this time  Plan:   - Stroke pathway  - Echo pending  - CT of chest abdomen and pelvis, no evidence of neoplasm  - Thrombosis panel pending  - Continue on ASA 81 mg QD and Plavix 75 mg QD for now, plan is for anticoagulation with Eliquis on 4/19   - Continue on atorvastatin 80 mg QPM    - Follow up with cardiology as out patient in regards to his loop recorder to see if this is still functional    - Follow up with vascular in regards to his right carotid artery stenosis, as out patient (asymptomatic)  - Goal normothermia and euglycemia    - PT/OT   - Stroke education    - Monitor exam and notify with changes

## 2023-04-17 NOTE — ASSESSMENT & PLAN NOTE
· Noted during 2019 visit, s/p thrombectomy  · Previously maintained on ASA/Plavix/Statin  · Since recent neuro visit only maintained on Plavix/Statin, Continue  · Follow up neuro consult given acute symptoms, however suspect 2/2 blood pressure drop

## 2023-04-17 NOTE — ASSESSMENT & PLAN NOTE
· Outpatient regimen of Norvasc 2 5mg QD, Toprol 100mg QD  · On arrival to ED with SBPs greater than 200  · Received IV labetalol 10mg x1 with drop in SBPs from 220s to 160s, potentially cause of stroke like symptoms  · Hydralazine prn meds  · HTN resolved  · Continue to trend VS

## 2023-04-17 NOTE — UTILIZATION REVIEW
"Initial Clinical Review    Admission: Date/Time/Statement:  4/16/23 2244 inpatient AND CHANGED 4/16/23 2334 OBSERVATION RE: LESS THAN 2 MIDNIGHT STAY EXPECTED FOR HYPERTENSIVE ENCEPHALOPATHY WITH PLAN IF NEURO CONSULT AND CHANGED BACK TO INPATIENT 4/17/23 AS > 2 MIDNIGHT STAY IS NOW EXPECTED DUE TO CONTINUED NEURO WORK UP AS MRI PENDING, NEUROLOGY WILL EVAL, CONTINUE NEURO CHECKS   Admission Orders (From admission, onward)     Ordered        04/17/23 0736  Inpatient Admission  Once                      Orders Placed This Encounter   Procedures   • INPATIENT ADMISSION     Standing Status:   Standing     Number of Occurrences:   1     Order Specific Question:   Level of Care     Answer:   Med Surg [16]     Order Specific Question:   Estimated length of stay     Answer:   More than 2 Midnights     Order Specific Question:   Certification     Answer:   I certify that inpatient services are medically necessary for this patient for a duration of greater than two midnights  See H&P and MD Progress Notes for additional information about the patient's course of treatment  ED Arrival Information     Expected   -    Arrival   4/16/2023 20:15    Acuity   Emergent            Means of arrival   Ambulance    Escorted by   [facility]    Service   Hospitalist    Admission type   Emergency            Arrival complaint   STROKE alert           Chief Complaint   Patient presents with   • Hypertension     Presents via EMS for c/o \"feeling off\" since yesterday after he started new meds for back pain yesterday  A&O x 4 upon arrival  Residual left side weakness from previous stroke  On plavix  Denies fall or injury  Initial Presentation: 58 y o  male from home to ED via ems admitted to inpatient [address] and [address] due to hypertensive encephalopathy  PMH of hpt, memory difficulty, stroke on Plavix/statin, DM, chronic low back pain    Presented due to confusion and feeling off " starting day prior to arrival   Has chronic left sided weakness  Per ems hypertensive  210/100  Recently started mediation(antiinflammatory and muscle relaxant) for back pain  On exam hypertensive  In the ED started with speech difficulty  Stroke alert called  NIHSS to 3  Wbc 14 79  Cta head and neck showed severe stenosis distal Right M1 segment  Discussed with neurology, not a thrombolytic candidate  Loaded with asa and Plavix  Given IV labetalol  Plan is continue asa and Plavix  BP goal 045 - 819 systolic and up to 171 Diastolic  Home Norvasc and Toprol on hold  Hold muscle relaxants  Neuro exams  Continue Aricept  Hold home metformin, add SSI with accuchecks  Pain control with Lidocaine patch, start tramadol  Tylenol and ibuprofen as needed  4/17/23 CHANGED TO INPATIENT:  Diagnosis:  TIA  Remains hypertensive  Telemetry sinus  On exam awake and alert   Obese  Wbc 12 74  Continue Plavix and statin  Serial neuro exams  MRI  Pain control for chronic back pain:  Lidocaine patch, tramadol, Voltaren gel, ice and aqua K, tylenol and ibuprofen as needed  Monitor BP, home antihypertensives on hold, give Labetalol as needed         Date: 4/18/23   Day 2: + left posterior MCA stroke  MRI showed scattered ischemic infarcts in left posterior MCA which indicates embolic disease  Today problems with texting  chronic lower back pain  On exam: able to follow commands  Motor - 5/5 upper extremities and lower extremities, without drift, normal tone and bulk, except 4/5 left upper with drift noted  To continue telemetry  Echo  Continue asa and Plavix  Continue atorvastatin        ED Triage Vitals [04/16/23 2020]   Temperature Pulse Respirations Blood Pressure SpO2   98 6 °F (37 °C) 93 18 (!) 237/102 100 %      Temp Source Heart Rate Source Patient Position - Orthostatic VS BP Location FiO2 (%)   Oral Monitor Lying Left arm --      Pain Score       9          Wt Readings from Last 1 Encounters:   04/18/23 108 kg (237 lb)     Additional Vital Signs:   04/18/23 0905 -- 64 -- 160/80 -- -- -- --   04/18/23 05:08:26 -- 66 -- 160/80 107 96 % -- --   04/18/23 01:42:08 -- 61 -- 162/88 113 95 % -- --   04/17/23 22:27:34 98 °F (36 7 °C) 67 -- 141/84 103 97 % -- --   04/17/23 22:14:39 98 °F (36 7 °C) 64 16 139/72 94 94 % -- --   04/17/23 19:25:14 97 9 °F (36 6 °C) 69 18 111/82 92 95 % -- --   04/17/23 1631 -- 80 -- 173/83 Abnormal  -- -- -- --   04/17/23 1630 -- 80 17 173/83 Abnormal  -- 98 % None (Room air)      04/17/23 0503 -- 85 16 178/73 Abnormal  -- 100 % None (Room air) Lying   04/17/23 0246 -- 80 14 173/81 Abnormal  -- 98 % None (Room air) Lying   04/17/23 0200 -- 89 -- 162/74 107 -- -- --   04/17/23 0100 -- 81 -- 195/81 Abnormal  116 -- -- --   04/17/23 0030 -- 83 -- 168/75 108 -- -- --   04/17/23 0000 -- 81 -- 170/76 109 97 % -- --   04/16/23 22:02:03 -- -- -- 163/78 -- -- -- --   04/16/23 2100 -- 90 22 211/89 Abnormal  141 98 % --      Pertinent Labs/Diagnostic Test Results:   CT chest abdomen pelvis w contrast   Final Result by Josue Marte MD (04/18 0015)      No evidence of neoplasm throughout the chest, abdomen or pelvis  Minimal aneurysmal dilatation of infrarenal abdominal aorta up to 3 3 cm  Workstation performed: [id number]         MRI brain wo contrast   Final Result by Stephan Schumacher MD (04/17 9257)      1  Punctate acute to subacute infarcts are noted within the left parietal-occipital region  2  Moderate chronic microangiopathic ischemic changes  The study was marked in [facility] for immediate notification  Workstation performed: [id number]         CTA stroke alert (head/neck)   Final Result by Julius Parmar MD (04/16 2233)      Severe stenosis again noted at the distal right M1 segment, likely reflecting partial recanalization from prior thrombotic occlusion    Although the findings are similar to the prior exam, there appears to moderately decreased flow related enhancement of    the distal right MCA branches when compared to the left  An MRI brain is recommended for further evaluation  No evidence of acute thrombus or large vessel occlusion within the major vessels of the [place]  Significant atherosclerotic plaque in the proximal internal carotid arteries, not significantly changed from the prior exam             I personally discussed this study with Dr Kush Becker on 4/16/2023 at 10:09 PM          Workstation performed: [id number]         CT stroke alert brain   Final Result by Teena Colon MD (04/16 2214)      No acute intracranial abnormality or significant interval change from prior study performed earlier the same day  Stable microangiopathic changes and volume loss  I personally discussed this study with Dr Kush Becker on 4/16/2023 at 10:09 PM                   Workstation performed: [id number]         XR chest 1 view portable   ED Interpretation by Valencia Burgos DO (04/16 2101)   No acute infiltrate or congestive heart failure      Final Result by Alley Haider MD (04/17 0957)      No acute cardiopulmonary disease  Workstation performed: [id number]         CT head without contrast   Final Result by Ilana Thomson DO (04/16 2056)      No acute intracranial abnormality or significant interval change from prior study  Chronic microangiopathic changes  Workstation performed: [id number]           4/16/23 ecg Normal sinus rhythm  Nonspecific ST abnormality  Abnormal ECG    4/18/23 echo Left Ventricle: Left ventricular cavity size is normal  Wall thickness is mildly increased  The left ventricular ejection fraction is 55%  Systolic function is normal  Wall motion is normal  Diastolic function is mildly abnormal, consistent with grade I (abnormal) relaxation  •  Left Atrium: The atrium is mildly dilated  •  Right Atrium: The atrium is mildly dilated    • Aortic Valve: There is mild stenosis  •  Mitral Valve: There is mild regurgitation  •  Tricuspid Valve: There is mild regurgitation        Results from last 7 days   Lab Units 04/18/23  0502 04/17/23  0557 04/16/23 2028   WBC Thousand/uL 11 57* 12 34* 14 79*   HEMOGLOBIN g/dL 15 9 14 2 15 8   HEMATOCRIT % 47 7 42 2 46 3   PLATELETS Thousands/uL 246 228 263   NEUTROS ABS Thousands/µL  --  7 78* 9 10*     Results from last 7 days   Lab Units 04/18/23  0502 04/17/23  0557 04/16/23 2028   SODIUM mmol/L 136 137 136   POTASSIUM mmol/L 4 3 4 1 3 7   CHLORIDE mmol/L 105 106 106   CO2 mmol/L 23 24 24   ANION GAP mmol/L 8 7 6   BUN mg/dL 15 18 19   CREATININE mg/dL 0 69 0 84 0 83   EGFR ml/min/1 73sq m 100 92 92   CALCIUM mg/dL 9 3 8 8 9 1     Results from last 7 days   Lab Units 04/17/23  0557 04/16/23 2028   AST U/L 19 20   ALT U/L 22 28   ALK PHOS U/L 75 89   TOTAL PROTEIN g/dL 6 7 7 4   ALBUMIN g/dL 3 9 4 3   TOTAL BILIRUBIN mg/dL 0 57 0 50     Results from last 7 days   Lab Units 04/18/23  0502 04/17/23  0557 04/16/23 2028   GLUCOSE RANDOM mg/dL 112 119 115     Results from last 7 days   Lab Units 04/16/23  2241 04/16/23 2047   HS TNI 0HR ng/L  --  21   HS TNI 2HR ng/L 17  --    HSTNI D2 ng/L -4  --      Results from last 7 days   Lab Units 04/16/23 2028   PROTIME seconds 12 7   INR  0 89   PTT seconds 28     Results from last 7 days   Lab Units 04/17/23  0243   CLARITY UA  Clear   COLOR UA  Yellow   SPEC GRAV UA  1 025   PH UA  6 0   GLUCOSE UA mg/dl Negative   KETONES UA mg/dl Negative   BLOOD UA  Negative   PROTEIN UA mg/dl 30 (1+)*   NITRITE UA  Negative   BILIRUBIN UA  Negative   UROBILINOGEN UA (BE) mg/dl <2 0   LEUKOCYTES UA  Negative   WBC UA /hpf 0-1   RBC UA /hpf None Seen   BACTERIA UA /hpf None Seen   EPITHELIAL CELLS WET PREP /hpf None Seen     Results from last 7 days   Lab Units 04/17/23  0244   AMPH/METH  Negative   BARBITURATE UR  Negative   BENZODIAZEPINE UR  Negative   COCAINE UR  Negative METHADONE URINE  Negative   OPIATE UR  Negative   PCP UR  Negative   THC UR  Negative     Results from last 7 days   Lab Units 04/16/23 2028   ETHANOL LVL mg/dL <10   ACETAMINOPHEN LVL ug/mL <70*   SALICYLATE LVL mg/dL <5       ED Treatment:   Medication Administration from 04/16/2023 2015 to 04/17/2023 9986       Date/Time Order Dose Route Action Comments     04/16/2023 2116 EDT labetalol (NORMODYNE) injection 10 mg 10 mg Intravenous Given --     04/16/2023 2229 EDT aspirin tablet 325 mg 325 mg Oral Given --     04/16/2023 2229 EDT clopidogrel (PLAVIX) tablet 300 mg 300 mg Oral Given --     04/17/2023 0557 EDT pantoprazole (PROTONIX) EC tablet 20 mg 20 mg Oral Given --        Past Medical History:   Diagnosis Date   • Diabetes mellitus ([facility] )    • Dyslipidemia 3/26/2019   • Hypertension    • Stroke [facility])      Present on Admission:  • Hypertensive encephalopathy, transient  • Urinary retention  • Type 2 diabetes mellitus, without long-term current use of insulin ([facility])  • Stenosis of right carotid artery  • Primary hypertension  • GERD (gastroesophageal reflux disease)  • Chronic low back pain  • Memory difficulties      Admitting Diagnosis: Transient alteration of awareness [R40 4]  Hypertension [I10]  Stroke ([place] [address] ) [I63 9]  Hypertensive urgency [I16 0]  Age/Sex: 58 y o  male  Admission Orders:  Scheduled Medications:  aspirin, 81 mg, Oral, Daily  atorvastatin, 80 mg, Oral, After Dinner  citalopram, 20 mg, Oral, Daily  clopidogrel, 75 mg, Oral, Daily  donepezil, 5 mg, Oral, BID  enoxaparin, 40 mg, Subcutaneous, Daily  lidocaine, 2 patch, Topical, Daily  pantoprazole, 20 mg, Oral, Early Morning  tamsulosin, 0 4 mg, Oral, Daily With Dinner    Diclofenac Sodium (VOLTAREN) 1 % topical gel 4 g  Dose: 4 g  Freq: 4 times daily Route: TP  Start: 04/17/23 1200    Continuous IV Infusions: none      PRN Meds: not used     acetaminophen, 650 mg, Oral, Q6H PRN  ibuprofen, 600 mg, Oral, Q6H PRN  labetalol, 10 mg, Intravenous, Q6H PRN x 1 4/17  ondansetron, 4 mg, Intravenous, Q6H PRN  traMADol, 50 mg, Oral, Q6H PRN    Telemetry  Neuro check every 4 hours  IP CONSULT TO NEUROLOGY      Network Utilization Review Department  ATTENTION: Please call with any questions or concerns to [phone] and carefully listen to the prompts so that you are directed to the right person  All voicemails are confidential   Palak Ramirez all requests for admission clinical reviews, approved or denied determinations and any other requests to dedicated fax number below belonging to the campus where the patient is receiving treatment   List of dedicated fax numbers for the Facilities:  [address] DENIALS (Administrative/Medical Necessity) [phone]   [address] (Maternity/NICU/Pediatrics) [phone]   914 Mary Vargas [phone]   Jeferson Bloom [address] [phone]   [address] [place] [address] [facility] [phone]   1550 [facility] Elpidio Cade [address] 815 [facility] [phone]

## 2023-04-17 NOTE — ASSESSMENT & PLAN NOTE
- R MCA stroke in March 2019 s/p thrombectomy     - Maintained on Plavix 75 mg QD and atorvastatin 80 mg QPM

## 2023-04-17 NOTE — PROGRESS NOTES
[place]  Progress Note  Name: Fabiana Beckford  MRN: [id number]  Unit/Bed#: ED 07 I Date of Admission: 4/16/2023   Date of Service: 4/17/2023 I Hospital Day: 1    Assessment/Plan   Hypertensive encephalopathy, transient  Assessment & Plan  · With recent admission in January 2023 with similar presentation  · Was noted to be confused and dysarthric while in ED post administration of 10mg IV labetolol with 60mmHg drop in SBP  · Stroke alert called, imaging negative for acute CVA, reaffirming right carotid stenosis of 60%  · Continue to hold muscle relaxers  · neuro consult  · Continue Plavix/Statin  · BP improved    · Serial neuro exams  · Advised to follow up outpatient EEG study, not yet preformed since neuro follow up in January  · Follow up loop recorder interpretation  · MRI pending    Chronic low back pain  Assessment & Plan  · Patient endorsing chronic low back pain x30 years, was prior   · States that he recently started taking muscle relaxer's (Robaxin), states he felt confused after taking a dose over last two days  · Lidocaine patch  · Start tramadol  · Voltaren gel  · Ice and aqua K  · Tylenol and Ibuprofen PRN  · outpatient follow up with pain management    Memory difficulties  Assessment & Plan  · Chronically maintained on aricept 5mg  · Patient states he did not have memory difficulty until taking his Robaxin over last two days  · CT imaging negative  · Continue aricept    Chronic ischemic right MCA stroke  Assessment & Plan  · Noted during 2019 visit, s/p thrombectomy  · Previously maintained on ASA/Plavix/Statin  · Plavix/Statin, Continue  · Neuro consulted recs appreciated    Type 2 diabetes mellitus, without long-term current use of insulin ([facility] [address] )  Assessment & Plan  Lab Results   Component Value Date    HGBA1C 5 8 (H) 01/13/2023     · Home regimen of metformin 500mg QD  · A1C pending  · Accuchecks, follow with sliding scale insulin as needed, resume metformin at discharge      Urinary retention  Assessment & Plan  · Continue home flomax    Stenosis of right carotid artery  Assessment & Plan  · 4/16 CTA showing: Calcified and noncalcified plaque in the proximal internal carotid artery results in stable moderate (50-69%) stenosis  · Continue Plavix/Statin    GERD (gastroesophageal reflux disease)  Assessment & Plan  · Home regimen of prilosec  · Protonix while inpatient    Primary hypertension  Assessment & Plan  · Outpatient regimen of Norvasc 2 5mg QD, Toprol 100mg QD  · On arrival to ED with SBPs greater than 200  · Received IV labetalol 10mg x1 with drop in SBPs from 220s to 160s, potentially cause of stroke like symptoms  · Hydralazine prn meds  · HTN resolved  · Continue to trend VS           VTE Pharmacologic Prophylaxis:   Moderate Risk (Score 3-4) - Pharmacological DVT Prophylaxis Ordered: enoxaparin (Lovenox)  Patient Centered Rounds: I performed bedside rounds with nursing staff today  Discussions with Specialists or Other Care Team Provider: Neuro, CM, Pt, OT    Education and Discussions with Family / Patient: Updated  (wife) via phone  Total Time Spent on Date of Encounter in care of patient: 35 minutes This time was spent on one or more of the following: performing physical exam; counseling and coordination of care; obtaining or reviewing history; documenting in the medical record; reviewing/ordering tests, medications or procedures; communicating with other healthcare professionals and discussing with patient's family/caregivers  Current Length of Stay: 1 day(s)  Current Patient Status: Inpatient   Certification Statement: The patient will continue to require additional inpatient hospital stay due to TIA  Discharge Plan: Anticipate discharge tomorrow to discharge location to be determined pending rehab evaluations  Code Status: Level 1 - Full Code    Subjective:   Cala Opitz was seen and examined at bedside   No acute events overnight  Discussed plan of care  All questions and concerns were answered and addressed  Objective:     Vitals:   Temp (24hrs), Av 6 °F (37 °C), Min:98 6 °F (37 °C), Max:98 6 °F (37 °C)    Temp:  [98 6 °F (37 °C)] 98 6 °F (37 °C)  HR:  [75-93] 84  Resp:  [14-22] 18  BP: (147-237)/() 147/71  SpO2:  [96 %-100 %] 98 %  Body mass index is 36 14 kg/m²  Input and Output Summary (last 24 hours): Intake/Output Summary (Last 24 hours) at 2023 1116  Last data filed at 2023 0608  Gross per 24 hour   Intake --   Output 600 ml   Net -600 ml       Physical Exam:   Physical Exam  Vitals and nursing note reviewed  Constitutional:       General: He is not in acute distress  Appearance: Normal appearance  He is obese  He is not ill-appearing  HENT:      Head: Normocephalic and atraumatic  Cardiovascular:      Rate and Rhythm: Normal rate and regular rhythm  Pulses: Normal pulses  Heart sounds: Normal heart sounds  Pulmonary:      Effort: Pulmonary effort is normal       Breath sounds: Normal breath sounds  Abdominal:      General: Abdomen is flat  Bowel sounds are normal       Palpations: Abdomen is soft  Musculoskeletal:      Right lower leg: No edema  Left lower leg: No edema  Skin:     General: Skin is warm  Neurological:      General: No focal deficit present  Mental Status: He is alert and oriented to person, place, and time            Additional Data:     Labs:  Results from last 7 days   Lab Units 23  0557   WBC Thousand/uL 12 34*   HEMOGLOBIN g/dL 14 2   HEMATOCRIT % 42 2   PLATELETS Thousands/uL 228   NEUTROS PCT % 63   LYMPHS PCT % 22   MONOS PCT % 10   EOS PCT % 3     Results from last 7 days   Lab Units 23  0557   SODIUM mmol/L 137   POTASSIUM mmol/L 4 1   CHLORIDE mmol/L 106   CO2 mmol/L 24   BUN mg/dL 18   CREATININE mg/dL 0 84   ANION GAP mmol/L 7   CALCIUM mg/dL 8 8   ALBUMIN g/dL 3 9   TOTAL BILIRUBIN mg/dL 0 57   ALK PHOS U/L 75 ALT U/L 22   AST U/L 19   GLUCOSE RANDOM mg/dL 119     Results from last 7 days   Lab Units 04/16/23 2028   INR  0 89                   Lines/Drains:  Invasive Devices     Peripheral Intravenous Line  Duration           Peripheral IV 04/16/23 Right;Ventral (anterior) Hand 1 day    Peripheral IV 04/16/23 Right Antecubital <1 day                  Telemetry:  Telemetry Orders (From admission, onward)             48 Hour Telemetry Monitoring  (ED Bridging Orders Panel)  Continuous x 24 Hours (Telem)        References:    Telemetry Guidelines   Question:  Reason for 24 Hour Telemetry  Answer:  Acute CVA (>24 hrs old)                            Imaging: Reviewed radiology reports from this admission including: CT head    Recent Cultures (last 7 days):         Last 24 Hours Medication List:   Current Facility-Administered Medications   Medication Dose Route Frequency Provider Last Rate   • acetaminophen  650 mg Oral Q6H PRN ERIC Cortes     • aspirin  81 mg Oral Daily [facility]     • atorvastatin  80 mg Oral After ONEOKERIC     • citalopram  20 mg Oral Daily [facility]     • clopidogrel  75 mg Oral Daily [facility]     • Diclofenac Sodium  4 g Topical 4x Daily Eduarda Meadows MD     • donepezil  5 mg Oral BID ERIC Cortes     • enoxaparin  40 mg Subcutaneous Daily [facility]     • hydrALAZINE  5 mg Intravenous Q6H PRN Eduarda Meadows MD     • ibuprofen  600 mg Oral Q6H PRN ERIC Cortes     • lidocaine  2 patch Topical Daily ERIC Osorio     • ondansetron  4 mg Intravenous Q6H PRN ERIC Cortes     • pantoprazole  20 mg Oral Early Morning ERIC Cortes     • tamsulosin  0 4 mg Oral Daily With ONEOKERIC     • traMADol  50 mg Oral Q6H PRN ERIC Cortes          Today, Patient Was Seen By: Anthony De La Cruz MD    **Please Note: This note may have been constructed using a voice recognition system  **

## 2023-04-17 NOTE — CASE MANAGEMENT
Case Management Assessment & Discharge Planning Note    Patient name Rogelio Whittaker  Location ED 07/ED 07 MRN [id number]  : 1960 Date 2023       Current Admission Date: 2023  Current Admission Diagnosis:Hypertensive encephalopathy, transient   Patient Active Problem List    Diagnosis Date Noted   • Middle cerebral artery stenosis, right 2023   • Altered mental status 2023   • Chronic low back pain 2023   • Hypertensive encephalopathy, transient 2023   • Snoring 08/10/2020   • Memory difficulties 08/10/2020   • Chronic ischemic right MCA stroke 2019   • Status post placement of implantable loop recorder 2019   • Type 2 diabetes mellitus, without long-term current use of insulin ([facility] ) 2019   • Adjustment reaction with anxiety 2019   • Insomnia 2019   • Fall 2019   • 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 ([address] ) 2019   • Constipation 2019   • Urinary retention 2019   • Hypertriglyceridemia 2019   • Nicotine dependence 2019   • Stenosis of right carotid artery 2019   • Presbyopia 2019   • History of stroke 2019   • Headache 2019   • Primary hypertension 2019   • GERD (gastroesophageal reflux disease) 2019      LOS (days): 1  Geometric Mean LOS (GMLOS) (days):   Days to GMLOS:     OBJECTIVE:    Risk of Unplanned Readmission Score: 7 53         Current admission status: Inpatient  Referral Reason: Stroke    Preferred Pharmacy:   [facility] [address] [place] [address] [address]  [address] [address]  Phone: [phone] [facility] [address] [id number]  Phone: [phone] Fax: [phone]    Primary Care Provider: ERIC Kang    Primary Insurance: BLUE CROSS  Secondary Insurance: [facility] REP    ASSESSMENT:  Active Health Care Proxies     Melissa MYERS 143 Representative - Spouse   Primary Phone: [phone] [facility]  Home Phone: [phone]               Advance Directives  Does patient have a [address] [place] [address] Avenue?: No  Was patient offered paperwork?: Yes (declined)  Does patient currently have a Health Care decision maker?: Yes, please see Health Care Proxy section  Does patient have Advance Directives?: No  Was patient offered paperwork?: Yes (declined)  Primary Contact: Herve Door         Readmission Root Cause  30 Day Readmission: No    Patient Information  Admitted from[de-identified] Home  Mental Status: Alert  During Assessment patient was accompanied by: Not accompanied during assessment  Assessment information provided by[de-identified] Patient  Primary Caregiver: Self  Support Systems: Spouse/significant other  [place] of Residence: [address] [place] [address] do you live in?: [facility] entry access options   Select all that apply : Stairs  Number of steps to enter home : 1  Type of Current Residence: 2 story home  Upon entering residence, is there a bedroom on the main floor (no further steps)?: Yes  Upon entering residence, is there a bathroom on the main floor (no further steps)?: Yes  In the last 12 months, was there a time when you were not able to pay the mortgage or rent on time?: No  In the last 12 months, how many places have you lived?: 1  In the last 12 months, was there a time when you did not have a steady place to sleep or slept in a shelter (including now)?: No  Homeless/housing insecurity resource given?: N/A  Living Arrangements: Lives w/ Spouse/significant other  Is patient a ?: No    Activities of Daily Living Prior to Admission  Functional Status: Independent  Completes ADLs independently?: Yes  Ambulates independently?: Yes  Does patient use assisted devices?: No  Does patient currently own DME?: Yes  What DME does the patient currently own?: Shower Chair, Electric Wheelchair  Does patient have a history of Outpatient Therapy (PT/OT)?: Yes  Does the patient have a history of Short-Term Rehab?: No  Does patient have a history of HHC?: No  Does patient currently have [address]?: No         Patient Information Continued  Income Source: Pension/prison  Does patient have prescription coverage?: Yes  Within the past 12 months, you worried that your food would run out before you got the money to buy more : Never true  Within the past 12 months, the food you bought just didn't last and you didn't have money to get more : Never true  Food insecurity resource given?: N/A  Does patient receive dialysis treatments?: No  Does patient have a history of substance abuse?: No  Does patient have a history of Mental Health Diagnosis?: No         Means of Transportation  Means of Transport to Appts[de-identified] Drives Self  In the past 12 months, has lack of transportation kept you from medical appointments or from getting medications?: No  In the past 12 months, has lack of transportation kept you from meetings, work, or from getting things needed for daily living?: No  Was application for public transport provided?: N/A        DISCHARGE DETAILS:    Discharge planning discussed with[de-identified] Cm met with pt at bedside in ED  Freedom of Choice: Yes  Comments - Freedom of Choice: Discussed Dc planning and role of Cm  CM contacted family/caregiver?: No- see comments (pt alert and indpt in room)     Did patient/caregiver verbalize understanding of patient care needs?: Yes  Were patient/caregiver advised of the risks associated with not following Treatment Team discharge recommendations?: Yes    Contacts  Reason/Outcome: Discharge [address]         Is the patient interested in [address] at discharge?: No    DME Referral Provided  Referral made for DME?: No    Other Referral/Resources/Interventions Provided:  Interventions: None Indicated, Prescription Price Check  Referral Comments: Pending MRI pt is indpt in room  AAOx4   OOB in recliner  No ambulatory needs anticipated  Possible medication needs pending results            Discharge Destination Plan[de-identified] Home, Home with [place] at Discharge : Family                                      Additional Comments: Cm met with pt in ED  Pt was OOB to recliner and AAox4 throughout assessment  Pt reports that he lives with his wife in a 2sth with a 1st floor set up and 1 chiara  He states he is indpt with ambulation but has an electrice wc  shower chair and grab bars in the bathroom  Pt states he has no HHC/STR/MH/DA hx  He reports he has particiapted in OP PT before  Pts PCP is [facility]  He drives and his family will pick him up to return home  PT to have MRI  Need TBD  No anticipated ambulatory needs at this time

## 2023-04-17 NOTE — ASSESSMENT & PLAN NOTE
· With recent admission in January 2023 with similar presentation  · Was noted to be confused and dysarthric while in ED post administration of 10mg IV labetolol with 60mmHg drop in SBP  · Stroke alert called, imaging negative for acute CVA, reaffirming right carotid stenosis of 60%  · Continue to hold muscle relaxers  · neuro consult  · Continue Plavix/Statin  · BP improved    · Serial neuro exams  · Advised to follow up outpatient EEG study, not yet preformed since neuro follow up in January  · Follow up loop recorder interpretation  · MRI pending

## 2023-04-17 NOTE — ASSESSMENT & PLAN NOTE
- Patient notes history of low back pain x 30 years  - Patient reports taking muscle relaxant (Robaxin per chart review) yesterday for the pain and symptoms began shortly after taking this  - Patient notes he is trying to schedule outpatient pain management evaluation as he has seen them in the past for this and underwent STEPHANIE with relief of the pain  He notes most recent injection was about 10 years ago

## 2023-04-17 NOTE — ASSESSMENT & PLAN NOTE
Lab Results   Component Value Date    HGBA1C 5 8 (H) 01/13/2023     · Home regimen of metformin 500mg QD  · Recheck A1C during admission  · Accuchecks, follow with sliding scale insulin as needed, resume metformin at discharge

## 2023-04-17 NOTE — ASSESSMENT & PLAN NOTE
· Chronically maintained on aricept 5mg  · Patient states he did not have memory difficulty until taking his Robaxin over last two days  · CT imaging negative  · Continue aricept

## 2023-04-17 NOTE — QUICK NOTE
Stroke alert called: 940  Neurology response: immediate  LKW: 2132  NIHSS: at least 5 for expressive aphasia and not following commands and answering questions accurately  I am told patient came in initially today for transeint confusion and concern for robaxin a/e  Pt then later in the day prior to stroke alert was noted to have expressive aphasia new onset  CT H stat not acute  CTA H/N with RMCA M1 stenosis mildly worse than prior no LVO    By the time patient out of scanner  NIHSS 0 I am told    TNKtPA no as NIHSS 0   Also concern that patient may have had MCA territory branch vessel ischemia already with perfusion dependent symptoms, he is aphasic when SBP drops below 170s  Recommend permissive HTN upto 220/120  Discussed with ED physician recommend 180 + SB    and Plavix load given significant intracranial atherosclerosis then continue ASA 81 and Plavix 75    MRI brain routine   ECHO    Discussed above with ED physician

## 2023-04-17 NOTE — PLAN OF CARE
Problem: Potential for Falls  Goal: Patient will remain free of falls  Description: INTERVENTIONS:  - Educate patient/family on patient safety including physical limitations  - Instruct patient to call for assistance with activity   - Consult OT/PT to assist with strengthening/mobility   - Keep Call bell within reach  - Keep bed low and locked with side rails adjusted as appropriate  - Keep care items and personal belongings within reach  - Initiate and maintain comfort rounds  - Make Fall Risk Sign visible to staff  - Offer Toileting every 2 Hours, in advance of need  - Initiate/Maintain alarm  - Obtain necessary fall risk management equipment  - Apply yellow socks and bracelet for high fall risk patients  - Consider moving patient to room near nurses station  Outcome: Progressing     Problem: Neurological Deficit  Goal: Neurological status is stable or improving  Description: Interventions:  - Monitor and assess patient's level of consciousness, motor function, sensory function, and level of assistance needed for ADLs  - Monitor and report changes from baseline  Collaborate with interdisciplinary team to initiate plan and implement interventions as ordered  - Provide and maintain a safe environment  - Consider seizure precautions  - Consider fall precautions  - Consider aspiration precautions  - Consider bleeding precautions  Outcome: Progressing     Problem: Activity Intolerance/Impaired Mobility  Goal: Mobility/activity is maintained at optimum level for patient  Description: Interventions:  - Assess and monitor patient  barriers to mobility and need for assistive/adaptive devices  - Assess patient's emotional response to limitations  - Collaborate with interdisciplinary team and initiate plans and interventions as ordered  - Encourage independent activity per ability   - Maintain proper body alignment  - Perform active/passive rom as tolerated/ordered  - Plan activities to conserve energy    - Turn patient as appropriate  Outcome: Progressing     Problem: Communication Impairment  Goal: Ability to express needs and understand communication  Description: Assess patient's communication skills and ability to understand information  Patient will demonstrate use of effective communication techniques, alternative methods of communication and understanding even if not able to speak  - Encourage communication and provide alternate methods of communication as needed  - Collaborate with case management/ for discharge needs  - Include patient/family/caregiver in decisions related to communication  Outcome: Progressing     Problem: Potential for Aspiration  Goal: Non-ventilated patient's risk of aspiration is minimized  Description: Assess and monitor vital signs, respiratory status, and labs (WBC)  Monitor for signs of aspiration (tachypnea, cough, rales, wheezing, cyanosis, fever)  - Assess and monitor patient's ability to swallow  - Place patient up in chair to eat if possible  - HOB up at 90 degrees to eat if unable to get patient up into chair   - Supervise patient during oral intake  - Instruct patient/ family to take small bites  - Instruct patient/ family to take small single sips when taking liquids  - Follow patient-specific strategies generated by speech pathologist   Outcome: Progressing     Problem: Nutrition  Goal: Nutrition/Hydration status is improving  Description: Monitor and assess patient's nutrition/hydration status for malnutrition (ex- brittle hair, bruises, dry skin, pale skin and conjunctiva, muscle wasting, smooth red tongue, and disorientation)  Collaborate with interdisciplinary team and initiate plan and interventions as ordered  Monitor patient's weight and dietary intake as ordered or per policy  Utilize nutrition screening tool and intervene per policy  Determine patient's food preferences and provide high-protein, high-caloric foods as appropriate  - Assist patient with eating   - Allow adequate time for meals   - Encourage patient to take dietary supplement as ordered  - Collaborate with clinical nutritionist   - Include patient/family/caregiver in decisions related to nutrition  Outcome: Progressing     Problem: NEUROSENSORY - ADULT  Goal: Achieves stable or improved neurological status  Description: INTERVENTIONS  - Monitor and report changes in neurological status  - Monitor vital signs such as temperature, blood pressure, glucose, and any other labs ordered   - Initiate measures to prevent increased intracranial pressure  - Monitor for seizure activity and implement precautions if appropriate      Outcome: Progressing  Goal: Achieves maximal functionality and self care  Description: INTERVENTIONS  - Monitor swallowing and airway patency with patient fatigue and changes in neurological status  - Encourage and assist patient to increase activity and self care     - Encourage visually impaired, hearing impaired and aphasic patients to use assistive/communication devices  Outcome: Progressing     Problem: CARDIOVASCULAR - ADULT  Goal: Maintains optimal cardiac output and hemodynamic stability  Description: INTERVENTIONS:  - Monitor I/O, vital signs and rhythm  - Monitor for S/S and trends of decreased cardiac output  - Administer and titrate ordered vasoactive medications to optimize hemodynamic stability  - Assess quality of pulses, skin color and temperature  - Assess for signs of decreased coronary artery perfusion  - Instruct patient to report change in severity of symptoms  Outcome: Progressing  Goal: Absence of cardiac dysrhythmias or at baseline rhythm  Description: INTERVENTIONS:  - Continuous cardiac monitoring, vital signs, obtain 12 lead EKG if ordered  - Administer antiarrhythmic and heart rate control medications as ordered  - Monitor electrolytes and administer replacement therapy as ordered  Outcome: Progressing     Problem: RESPIRATORY - ADULT  Goal: Achieves optimal ventilation and oxygenation  Description: INTERVENTIONS:  - Assess for changes in respiratory status  - Assess for changes in mentation and behavior  - Position to facilitate oxygenation and minimize respiratory effort  - Oxygen administered by appropriate delivery if ordered  - Initiate smoking cessation education as indicated  - Encourage broncho-pulmonary hygiene including cough, deep breathe, Incentive Spirometry  - Assess the need for suctioning and aspirate as needed  - Assess and instruct to report SOB or any respiratory difficulty  - Respiratory Therapy support as indicated  Outcome: Progressing     Problem: GENITOURINARY - ADULT  Goal: Maintains or returns to baseline urinary function  Description: INTERVENTIONS:  - Assess urinary function  - Encourage oral fluids to ensure adequate hydration if ordered  - Administer IV fluids as ordered to ensure adequate hydration  - Administer ordered medications as needed  - Offer frequent toileting  - Follow urinary retention protocol if ordered  Outcome: Progressing  Goal: Absence of urinary retention  Description: INTERVENTIONS:  - Assess patient’s ability to void and empty bladder  - Monitor I/O  - Bladder scan as needed  - Discuss with physician/AP medications to alleviate retention as needed  - Discuss catheterization for long term situations as appropriate  Outcome: Progressing     Problem: METABOLIC, FLUID AND ELECTROLYTES - ADULT  Goal: Electrolytes maintained within normal limits  Description: INTERVENTIONS:  - Monitor labs and assess patient for signs and symptoms of electrolyte imbalances  - Administer electrolyte replacement as ordered  - Monitor response to electrolyte replacements, including repeat lab results as appropriate  - Instruct patient on fluid and nutrition as appropriate  Outcome: Progressing  Goal: Fluid balance maintained  Description: INTERVENTIONS:  - Monitor labs   - Monitor I/O and WT  - Instruct patient on fluid and nutrition as appropriate  - Assess for signs & symptoms of volume excess or deficit  Outcome: Progressing  Goal: Glucose maintained within target range  Description: INTERVENTIONS:  - Monitor Blood Glucose as ordered  - Assess for signs and symptoms of hyperglycemia and hypoglycemia  - Administer ordered medications to maintain glucose within target range  - Assess nutritional intake and initiate nutrition service referral as needed  Outcome: Progressing     Problem: HEMATOLOGIC - ADULT  Goal: Maintains hematologic stability  Description: INTERVENTIONS  - Assess for signs and symptoms of bleeding or hemorrhage  - Monitor labs  - Administer supportive blood products/factors as ordered and appropriate  Outcome: Progressing     Problem: MUSCULOSKELETAL - ADULT  Goal: Maintain or return mobility to safest level of function  Description: INTERVENTIONS:  - Assess patient's ability to carry out ADLs; assess patient's baseline for ADL function and identify physical deficits which impact ability to perform ADLs (bathing, care of mouth/teeth, toileting, grooming, dressing, etc )  - Assess/evaluate cause of self-care deficits   - Assess range of motion  - Assess patient's mobility  - Assess patient's need for assistive devices and provide as appropriate  - Encourage maximum independence but intervene and supervise when necessary  - Involve family in performance of ADLs  - Assess for home care needs following discharge   - Consider OT consult to assist with ADL evaluation and planning for discharge  - Provide patient education as appropriate  Outcome: Progressing  Goal: Maintain proper alignment of affected body part  Description: INTERVENTIONS:  - Support, maintain and protect limb and body alignment  - Provide patient/ family with appropriate education  Outcome: Progressing     Problem: PAIN - ADULT  Goal: Verbalizes/displays adequate comfort level or baseline comfort level  Description: Interventions:  - Encourage patient to monitor pain and request assistance  - Assess pain using appropriate pain scale  - Administer analgesics based on type and severity of pain and evaluate response  - Implement non-pharmacological measures as appropriate and evaluate response  - Consider cultural and social influences on pain and pain management  - Notify physician/advanced practitioner if interventions unsuccessful or patient reports new pain  Outcome: Progressing     Problem: INFECTION - ADULT  Goal: Absence or prevention of progression during hospitalization  Description: INTERVENTIONS:  - Assess and monitor for signs and symptoms of infection  - Monitor lab/diagnostic results  - Monitor all insertion sites, i e  indwelling lines, tubes, and drains  - Monitor endotracheal if appropriate and nasal secretions for changes in amount and color  - Mills appropriate cooling/warming therapies per order  - Administer medications as ordered  - Instruct and encourage patient and family to use good hand hygiene technique  - Identify and instruct in appropriate isolation precautions for identified infection/condition  Outcome: Progressing     Problem: SAFETY ADULT  Goal: Maintain or return to baseline ADL function  Description: INTERVENTIONS:  -  Assess patient's ability to carry out ADLs; assess patient's baseline for ADL function and identify physical deficits which impact ability to perform ADLs (bathing, care of mouth/teeth, toileting, grooming, dressing, etc )  - Assess/evaluate cause of self-care deficits   - Assess range of motion  - Assess patient's mobility; develop plan if impaired  - Assess patient's need for assistive devices and provide as appropriate  - Encourage maximum independence but intervene and supervise when necessary  - Involve family in performance of ADLs  - Assess for home care needs following discharge   - Consider OT consult to assist with ADL evaluation and planning for discharge  - Provide patient education as appropriate  Outcome: Progressing  Goal: Maintains/Returns to pre admission functional level  Description: INTERVENTIONS:  - Perform BMAT or MOVE assessment daily    - Set and communicate daily mobility goal to care team and patient/family/caregiver  - Collaborate with rehabilitation services on mobility goals if consulted  - Perform Range of Motion 3 times a day  - Reposition patient every 3 hours  - Dangle patient 3 times a day  - Stand patient 3 times a day  - Ambulate patient 3 times a day  - Out of bed to chair 3 times a day   - Out of bed for meals 3 times a day  - Out of bed for toileting  - Record patient progress and toleration of activity level   Outcome: Progressing     Problem: DISCHARGE PLANNING  Goal: Discharge to home or other facility with appropriate resources  Description: INTERVENTIONS:  - Identify barriers to discharge w/patient and caregiver  - Arrange for needed discharge resources and transportation as appropriate  - Identify discharge learning needs (meds, wound care, etc )  - Arrange for interpretive services to assist at discharge as needed  - Refer to Case Management Department for coordinating discharge planning if the patient needs post-hospital services based on physician/advanced practitioner order or complex needs related to functional status, cognitive ability, or social support system  Outcome: Progressing     Problem: Knowledge Deficit  Goal: Patient/family/caregiver demonstrates understanding of disease process, treatment plan, medications, and discharge instructions  Description: Complete learning assessment and assess knowledge base    Interventions:  - Provide teaching at level of understanding  - Provide teaching via preferred learning methods  Outcome: Progressing

## 2023-04-17 NOTE — UTILIZATION REVIEW
NOTIFICATION OF INPATIENT ADMISSION   AUTHORIZATION REQUEST   SERVIC[facility] FAC[facility]  [address] [place] [address]  Tax ID: [id number]  NPI: [id number] ATTENDING PROVIDER:  Attending Name and NPI#: Tessie Shaffer Md [[id number]]  Address: [address] [place] [address]  Phone: [phone]   ADMISSION INFORMATION:  Place of Service: [place] [address]  Place of Service Code: 21  Inpatient Admission Date/Time: 4/16/23 10:44 PM  Discharge Date/Time: No discharge date for patient encounter  Admitting Diagnosis Code/Description:  Hypertension [I10]     UTILIZATION REVIEW CONTACT:  Meryle Pain, Utilization   Network Utilization Review Department  Phone: [phone]  Fax [phone]  Email: [email or web address]  Contact for approvals/pending authorizations, clinical reviews, and discharge  PHYSICIAN ADVISORY SERVICES:  Medical Necessity Denial & Bfyt-hy-Ofyg Review  Phone: [phone]  Fax: [phone]  Email: [email or web address]

## 2023-04-17 NOTE — ASSESSMENT & PLAN NOTE
· Noted during 2019 visit, s/p thrombectomy  · Previously maintained on ASA/Plavix/Statin  · Plavix/Statin, Continue  · Neuro consulted recs appreciated

## 2023-04-17 NOTE — ED NOTES
According to MRI staff pt unable to lay flat for MRI  MRI staff requested pain medication for pt  I medicated pt but pt still unable to lay flat for MRI  MRI staff brought pt back and pt is requesting more pain medication for the MRI  Notified provider  Pt AO x 4, resting in recliner with call bell with in reach        Kati Xiong, GARRETT  04/17/23 1906

## 2023-04-17 NOTE — ASSESSMENT & PLAN NOTE
· Outpatient regimen of Norvasc 2 5mg QD, Toprol 100mg QD  · On arrival to ED with SBPs greater than 200  · Received IV labetalol 10mg x1 with drop in SBPs from 220s to 160s, potentially cause of stroke like symptoms  · Continue prn medications, consider lower dose  · Will continue to trend SBPs per unit routine  · Slowly drop SBPs in setting of dysarthria and confusion post labetalol, goal -200 now, can decrease parameter in AM  · Consider restarting home oral agents in AM

## 2023-04-18 ENCOUNTER — APPOINTMENT (INPATIENT)
Dept: NON INVASIVE DIAGNOSTICS | Facility: HOSPITAL | Age: 63
End: 2023-04-18

## 2023-04-18 VITALS
SYSTOLIC BLOOD PRESSURE: 160 MMHG | WEIGHT: 237 LBS | BODY MASS INDEX: 35.92 KG/M2 | HEIGHT: 68 IN | HEART RATE: 64 BPM | RESPIRATION RATE: 16 BRPM | TEMPERATURE: 98 F | DIASTOLIC BLOOD PRESSURE: 80 MMHG | OXYGEN SATURATION: 96 %

## 2023-04-18 LAB
ANION GAP SERPL CALCULATED.3IONS-SCNC: 8 MMOL/L (ref 4–13)
AORTIC ROOT: 3.6 CM
AORTIC VALVE MEAN VELOCITY: 11.7 M/S
APICAL FOUR CHAMBER EJECTION FRACTION: 60 %
ASCENDING AORTA: 3.1 CM
AV AREA BY CONTINUOUS VTI: 2 CM2
AV AREA PEAK VELOCITY: 1.8 CM2
AV LVOT MEAN GRADIENT: 1 MMHG
AV LVOT PEAK GRADIENT: 2 MMHG
AV MEAN GRADIENT: 7 MMHG
AV PEAK GRADIENT: 15 MMHG
AV VALVE AREA: 2.04 CM2
AV VELOCITY RATIO: 0.41
AVA (PLAN): 2.2 CM2
BUN SERPL-MCNC: 15 MG/DL (ref 5–25)
CALCIUM SERPL-MCNC: 9.3 MG/DL (ref 8.4–10.2)
CHLORIDE SERPL-SCNC: 105 MMOL/L (ref 96–108)
CO2 SERPL-SCNC: 23 MMOL/L (ref 21–32)
CREAT SERPL-MCNC: 0.69 MG/DL (ref 0.6–1.3)
DEPRECATED AT III PPP: 89 % OF NORMAL (ref 92–136)
DOP CALC AO PEAK VEL: 1.94 M/S
DOP CALC AO VTI: 39.05 CM
DOP CALC LVOT AREA: 4.52 CM2
DOP CALC LVOT DIAMETER: 2.4 CM
DOP CALC LVOT PEAK VEL VTI: 17.62 CM
DOP CALC LVOT PEAK VEL: 0.79 M/S
DOP CALC LVOT STROKE INDEX: 36.8 ML/M2
DOP CALC LVOT STROKE VOLUME: 79.67 CM3
E WAVE DECELERATION TIME: 256 MS
ERYTHROCYTE [DISTWIDTH] IN BLOOD BY AUTOMATED COUNT: 14 % (ref 11.6–15.1)
FRACTIONAL SHORTENING: 27 % (ref 28–44)
GFR SERPL CREATININE-BSD FRML MDRD: 100 ML/MIN/1.73SQ M
GLUCOSE SERPL-MCNC: 112 MG/DL (ref 65–140)
HCT VFR BLD AUTO: 47.7 % (ref 36.5–49.3)
HGB BLD-MCNC: 15.9 G/DL (ref 12–17)
INTERVENTRICULAR SEPTUM IN DIASTOLE (PARASTERNAL SHORT AXIS VIEW): 1.4 CM
INTERVENTRICULAR SEPTUM: 1.4 CM (ref 0.6–1.1)
LAAS-AP2: 22.3 CM2
LAAS-AP4: 23.3 CM2
LEFT ATRIUM AREA SYSTOLE SINGLE PLANE A4C: 23.5 CM2
LEFT ATRIUM SIZE: 3.9 CM
LEFT INTERNAL DIMENSION IN SYSTOLE: 3.5 CM (ref 2.1–4)
LEFT VENTRICLE DIASTOLIC VOLUME (MOD BIPLANE): 193 ML
LEFT VENTRICLE SYSTOLIC VOLUME (MOD BIPLANE): 79 ML
LEFT VENTRICULAR INTERNAL DIMENSION IN DIASTOLE: 4.8 CM (ref 3.5–6)
LEFT VENTRICULAR POSTERIOR WALL IN END DIASTOLE: 1.4 CM
LEFT VENTRICULAR STROKE VOLUME: 58 ML
LV EF: 59 %
LVSV (TEICH): 58 ML
MCH RBC QN AUTO: 31.9 PG (ref 26.8–34.3)
MCHC RBC AUTO-ENTMCNC: 33.3 G/DL (ref 31.4–37.4)
MCV RBC AUTO: 96 FL (ref 82–98)
MV E'TISSUE VEL-SEP: 6 CM/S
MV PEAK A VEL: 1 M/S
MV PEAK E VEL: 51 CM/S
MV STENOSIS PRESSURE HALF TIME: 74 MS
MV VALVE AREA P 1/2 METHOD: 2.97 CM2
PLATELET # BLD AUTO: 246 THOUSANDS/UL (ref 149–390)
PMV BLD AUTO: 9.8 FL (ref 8.9–12.7)
POTASSIUM SERPL-SCNC: 4.3 MMOL/L (ref 3.5–5.3)
RBC # BLD AUTO: 4.99 MILLION/UL (ref 3.88–5.62)
RIGHT ATRIUM AREA SYSTOLE A4C: 17.7 CM2
RIGHT VENTRICLE ID DIMENSION: 3.1 CM
SL CV LEFT ATRIUM LENGTH A2C: 5.8 CM
SL CV LV EF: 55
SL CV PED ECHO LEFT VENTRICLE DIASTOLIC VOLUME (MOD BIPLANE) 2D: 110 ML
SL CV PED ECHO LEFT VENTRICLE SYSTOLIC VOLUME (MOD BIPLANE) 2D: 52 ML
SODIUM SERPL-SCNC: 136 MMOL/L (ref 135–147)
TR MAX PG: 23 MMHG
TR PEAK VELOCITY: 2.4 M/S
TRICUSPID ANNULAR PLANE SYSTOLIC EXCURSION: 2.3 CM
TRICUSPID VALVE PEAK REGURGITATION VELOCITY: 2.37 M/S
WBC # BLD AUTO: 11.57 THOUSAND/UL (ref 4.31–10.16)

## 2023-04-18 RX ORDER — ASPIRIN 81 MG/1
81 TABLET ORAL DAILY
Qty: 30 TABLET | Refills: 0 | Status: ON HOLD | OUTPATIENT
Start: 2023-04-19

## 2023-04-18 RX ORDER — TRAMADOL HYDROCHLORIDE 50 MG/1
50 TABLET ORAL EVERY 6 HOURS PRN
Qty: 20 TABLET | Refills: 0 | OUTPATIENT
Start: 2023-04-18 | End: 2023-04-28

## 2023-04-18 RX ORDER — ASPIRIN 81 MG/1
81 TABLET ORAL DAILY
Qty: 30 TABLET | Refills: 0 | OUTPATIENT
Start: 2023-04-19

## 2023-04-18 RX ORDER — PANTOPRAZOLE SODIUM 20 MG/1
20 TABLET, DELAYED RELEASE ORAL
Qty: 30 TABLET | Refills: 0 | OUTPATIENT
Start: 2023-04-19

## 2023-04-18 RX ADMIN — CITALOPRAM HYDROBROMIDE 20 MG: 20 TABLET ORAL at 08:44

## 2023-04-18 RX ADMIN — CLOPIDOGREL BISULFATE 75 MG: 75 TABLET, FILM COATED ORAL at 08:44

## 2023-04-18 RX ADMIN — DICLOFENAC SODIUM 4 G: 10 GEL TOPICAL at 08:45

## 2023-04-18 RX ADMIN — LIDOCAINE 2 PATCH: 50 PATCH TOPICAL at 08:45

## 2023-04-18 RX ADMIN — ASPIRIN 81 MG: 81 TABLET, COATED ORAL at 08:44

## 2023-04-18 RX ADMIN — PANTOPRAZOLE SODIUM 20 MG: 20 TABLET, DELAYED RELEASE ORAL at 05:27

## 2023-04-18 RX ADMIN — AMLODIPINE BESYLATE 2.5 MG: 2.5 TABLET ORAL at 08:46

## 2023-04-18 RX ADMIN — METOPROLOL SUCCINATE 100 MG: 50 TABLET, EXTENDED RELEASE ORAL at 08:44

## 2023-04-18 RX ADMIN — DONEPEZIL HYDROCHLORIDE 5 MG: 5 TABLET ORAL at 08:45

## 2023-04-18 RX ADMIN — ENOXAPARIN SODIUM 40 MG: 100 INJECTION SUBCUTANEOUS at 08:44

## 2023-04-18 NOTE — DISCHARGE SUMMARY
[place]  Discharge- Gisel Alejandro 1960, 58 y o  male MRN: [id number]  Unit/Bed#: -01 Encounter: [id number]  Primary Care Provider: ERIC Yarbrough   Date and time admitted to hospital: 4/16/2023  8:16 PM    Chronic low back pain  Assessment & Plan  · Patient endorsing chronic low back pain x30 years, was prior   · States that he recently started taking muscle relaxer's (Robaxin), states he felt confused after taking a dose over last two days  · Lidocaine patch  · Start tramadol  · Voltaren gel  · Ice and aqua K  · Tylenol and Ibuprofen PRN  · outpatient follow up with pain management    Memory difficulties  Assessment & Plan  · Chronically maintained on aricept 5mg  · Patient states he did not have memory difficulty until taking his Robaxin over last two days  · CT imaging negative  · Continue aricept    Chronic ischemic right MCA stroke  Assessment & Plan  · Noted during 2019 visit, s/p thrombectomy  · Previously maintained on ASA/Plavix/Statin  · Plavix/Statin, Continue  · Neuro consulted recs appreciated    Type 2 diabetes mellitus, without long-term current use of insulin ([facility] [address] )  Assessment & Plan  Lab Results   Component Value Date    HGBA1C 5 8 (H) 01/13/2023     · Home regimen of metformin 500mg QD  · A1C pending  · Accuchecks, follow with sliding scale insulin as needed, resume metformin at discharge      Urinary retention  Assessment & Plan  · Continue home flomax    Stenosis of right carotid artery  Assessment & Plan  · 4/16 CTA showing: Calcified and noncalcified plaque in the proximal internal carotid artery results in stable moderate (50-69%) stenosis  · Continue Plavix/Statin    GERD (gastroesophageal reflux disease)  Assessment & Plan  · Home regimen of prilosec  · Protonix while inpatient    Primary hypertension  Assessment & Plan  · Outpatient regimen of Norvasc 2 5mg QD, Toprol 100mg QD  · On arrival to ED with SBPs greater than 200  · Received IV labetalol 10mg x1 with drop in SBPs from 220s to 160s, potentially cause of stroke like symptoms  · Hydralazine prn meds  · HTN resolved  · Continue to trend VS    * Hypertensive encephalopathy, transient  Assessment & Plan  · With recent admission in January 2023 with similar presentation  · Was noted to be confused and dysarthric while in ED post administration of 10mg IV labetolol with 60mmHg drop in SBP  · Stroke alert called, imaging negative for acute CVA, reaffirming right carotid stenosis of 60%  · Continue to hold muscle relaxers  · neuro consult  · Continue Plavix/Statin  · BP improved  · Serial neuro exams  · Advised to follow up outpatient EEG study, not yet preformed since neuro follow up in January  · Follow up loop recorder interpretation  · MRI pending      Medical Problems     Resolved Problems  Date Reviewed: 4/18/2023   None       Discharging Physician / Practitioner: Sofía Joshi PA-C  PCP: Eyal Millan  Admission Date:   Admission Orders (From admission, onward)     Ordered        04/17/23 0736  Inpatient Admission  Once            04/16/23 2334  Place in Observation  Once            04/16/23 2244  INPATIENT ADMISSION  Once                      Discharge Date: 04/18/23    Consultations During Hospital Stay:  · Neurology    Procedures Performed:   · Echocardiogram 4/18:  •  Left Ventricle: Left ventricular cavity size is normal  Wall thickness is mildly increased  The left ventricular ejection fraction is 55%  Systolic function is normal  Wall motion is normal  Diastolic function is mildly abnormal, consistent with grade I (abnormal) relaxation  •  Left Atrium: The atrium is mildly dilated  •  Right Atrium: The atrium is mildly dilated  •  Aortic Valve: There is mild stenosis  •  Mitral Valve: There is mild regurgitation  •  Tricuspid Valve: There is mild regurgitation        Significant Findings / Test Results:   · CT head 4/16: No acute intracranial abnormality or significant interval change from prior study  Chronic microangiopathic changes  · CXR 4/16: No acute cardiopulmonary disease  · CT stroke alert 4/16: No acute intracranial abnormality or significant interval change from prior study performed earlier the same day  Stable microangiopathic changes and volume loss  · CTA stroke alert 4/16: Severe stenosis again noted at the distal right M1 segment, likely reflecting partial recanalization from prior thrombotic occlusion  Although the findings are similar to the prior exam, there appears to moderately decreased flow related enhancement of the distal right MCA branches when compared to the left  An MRI brain is recommended for further evaluation  No evidence of acute thrombus or large vessel occlusion within the major vessels of the [place]  Significant atherosclerotic plaque in the proximal internal carotid arteries, not significantly changed from the prior exam  · MRI brain 4/17: Punctate acute to subacute infarcts are noted within the left parietal-occipital region  Moderate chronic microangiopathic ischemic changes  · CT chest abdomen pelvis 4/17: No evidence of neoplasm throughout the chest, abdomen or pelvis  Minimal aneurysmal dilatation of infrarenal abdominal aorta up to 3 3 cm  Incidental Findings:   · See above    Test Results Pending at Discharge (will require follow up): · Thrombosis panel     Outpatient Tests Requested:  · None    Complications:  None    Reason for Admission: None    Hospital Course:   Sudha Pina is a 58 y o  male patient with a past medical history of right MCA stroke in 2019, right carotid stenosis, hypertension, hyperlipidemia, diabetes mellitus type 2 who originally presented to the hospital on 4/16/2023 due to presents of episodes with SBP in the 220s  Patient developed acute confusion, dysarthria and stroke alert was called    Imaging was initially negative for CVA but later found to be positive "for multifocal stroke  No abnormalities noted on echo  CT chest abdomen pelvis was negative for any neoplasm  Thrombosis panel pending at time of discharge and will require outpatient follow-up  He does have a loop recorder in place but the battery is no longer functional as he has not follow-up with cardiology in some time  Referral made on discharge  Hemodynamically stable at time of discharge and appropriate for outpatient follow-up  Please see above list of diagnoses and related plan for additional information  Condition at Discharge: stable    Discharge Day Visit / Exam:   Subjective: Patient wants to leave the hospital, reports he feels fine and does not want to stay any longer  Orts he is able to ambulate without difficulty  Vitals: Blood Pressure: 160/80 (04/18/23 0508)  Pulse: 66 (04/18/23 0508)  Temperature: 98 °F (36 7 °C) (04/17/23 2227)  Temp Source: Oral (04/18/23 0508)  Respirations: 16 (04/17/23 2214)  Height: 5' 8\" (172 7 cm) (04/16/23 2020)  Weight - Scale: 108 kg (237 lb 10 5 oz) (04/16/23 2139)  SpO2: 96 % (04/18/23 0508)  Exam:   Physical Exam  Vitals and nursing note reviewed  Constitutional:       General: He is not in acute distress  Appearance: Normal appearance  He is well-developed  HENT:      Head: Normocephalic and atraumatic  Eyes:      General: No scleral icterus  Conjunctiva/sclera: Conjunctivae normal    Cardiovascular:      Rate and Rhythm: Normal rate and regular rhythm  Heart sounds: No murmur heard  Pulmonary:      Effort: Pulmonary effort is normal       Breath sounds: No wheezing, rhonchi or rales  Abdominal:      General: There is no distension  Palpations: Abdomen is soft  Skin:     General: Skin is warm and dry  Neurological:      General: No focal deficit present  Mental Status: He is alert  Psychiatric:         Mood and Affect: Mood normal        Discussion with Family: Patient declined call to    " Discharge instructions/Information to patient and family:   See after visit summary for information provided to patient and family  Provisions for Follow-Up Care:  See after visit summary for information related to follow-up care and any pertinent home health orders  Disposition:   Home    Planned Readmission: None     Discharge Statement:  I spent 65 minutes discharging the patient  This time was spent on the day of discharge  I had direct contact with the patient on the day of discharge  Greater than 50% of the total time was spent examining patient, answering all patient questions, arranging and discussing plan of care with patient as well as directly providing post-discharge instructions  Additional time then spent on discharge activities  Discharge Medications:  See after visit summary for reconciled discharge medications provided to patient and/or family        **Please Note: This note may have been constructed using a voice recognition system**

## 2023-04-18 NOTE — OCCUPATIONAL THERAPY NOTE
Occupational Therapy Screen Note     Patient Name: Kay Aguirre  Today's Date: 4/18/2023  Problem List  Principal Problem:    Hypertensive encephalopathy, transient  Active Problems:    Primary hypertension    GERD (gastroesophageal reflux disease)    Stenosis of right carotid artery    Urinary retention    Type 2 diabetes mellitus, without long-term current use of insulin (HCC)    Chronic ischemic right MCA stroke    Memory difficulties    Chronic low back pain    Middle cerebral artery stenosis, right    Left posterior MCA stroke - etiology unclear at this time          04/18/23 6335   OT Last Visit   OT Visit Date 04/18/23   Note Type   Note type Screen   Additional Comments OT orders received  Chart reviewed  Spoke with pt who endorses that he independent within room  Has no reported deficits or concerns for mobility/ADL performance  Will D/C OT orders               Raya Owen, OTR/L

## 2023-04-18 NOTE — PLAN OF CARE
Problem: Potential for Falls  Goal: Patient will remain free of falls  Description: INTERVENTIONS:  - Educate patient/family on patient safety including physical limitations  - Instruct patient to call for assistance with activity   - Consult OT/PT to assist with strengthening/mobility   - Keep Call bell within reach  - Keep bed low and locked with side rails adjusted as appropriate  - Keep care items and personal belongings within reach  - Initiate and maintain comfort rounds  - Make Fall Risk Sign visible to staff  - Offer Toileting every x Hours, in advance of need  - Initiate/Maintain xalarm  - Obtain necessary fall risk management equipment: x  - Apply yellow socks and bracelet for high fall risk patients  - Consider moving patient to room near nurses station  Outcome: Progressing     Problem: Neurological Deficit  Goal: Neurological status is stable or improving  Description: Interventions:  - Monitor and assess patient's level of consciousness, motor function, sensory function, and level of assistance needed for ADLs  - Monitor and report changes from baseline  Collaborate with interdisciplinary team to initiate plan and implement interventions as ordered  - Provide and maintain a safe environment  - Consider seizure precautions  - Consider fall precautions  - Consider aspiration precautions  - Consider bleeding precautions  Outcome: Progressing     Problem: Activity Intolerance/Impaired Mobility  Goal: Mobility/activity is maintained at optimum level for patient  Description: Interventions:  - Assess and monitor patient  barriers to mobility and need for assistive/adaptive devices  - Assess patient's emotional response to limitations  - Collaborate with interdisciplinary team and initiate plans and interventions as ordered  - Encourage independent activity per ability   - Maintain proper body alignment  - Perform active/passive rom as tolerated/ordered    - Plan activities to conserve energy   - Turn patient as appropriate  Outcome: Progressing     Problem: Communication Impairment  Goal: Ability to express needs and understand communication  Description: Assess patient's communication skills and ability to understand information  Patient will demonstrate use of effective communication techniques, alternative methods of communication and understanding even if not able to speak  - Encourage communication and provide alternate methods of communication as needed  - Collaborate with case management/ for discharge needs  - Include patient/family/caregiver in decisions related to communication  Outcome: Progressing     Problem: Potential for Aspiration  Goal: Non-ventilated patient's risk of aspiration is minimized  Description: Assess and monitor vital signs, respiratory status, and labs (WBC)  Monitor for signs of aspiration (tachypnea, cough, rales, wheezing, cyanosis, fever)  - Assess and monitor patient's ability to swallow  - Place patient up in chair to eat if possible  - HOB up at 90 degrees to eat if unable to get patient up into chair   - Supervise patient during oral intake  - Instruct patient/ family to take small bites  - Instruct patient/ family to take small single sips when taking liquids  - Follow patient-specific strategies generated by speech pathologist   Outcome: Progressing     Problem: Nutrition  Goal: Nutrition/Hydration status is improving  Description: Monitor and assess patient's nutrition/hydration status for malnutrition (ex- brittle hair, bruises, dry skin, pale skin and conjunctiva, muscle wasting, smooth red tongue, and disorientation)  Collaborate with interdisciplinary team and initiate plan and interventions as ordered  Monitor patient's weight and dietary intake as ordered or per policy  Utilize nutrition screening tool and intervene per policy   Determine patient's food preferences and provide high-protein, high-caloric foods as appropriate  - Assist patient with eating   - Allow adequate time for meals   - Encourage patient to take dietary supplement as ordered  - Collaborate with clinical nutritionist   - Include patient/family/caregiver in decisions related to nutrition  Outcome: Progressing     Problem: NEUROSENSORY - ADULT  Goal: Achieves stable or improved neurological status  Description: INTERVENTIONS  - Monitor and report changes in neurological status  - Monitor vital signs such as temperature, blood pressure, glucose, and any other labs ordered   - Initiate measures to prevent increased intracranial pressure  - Monitor for seizure activity and implement precautions if appropriate      Outcome: Progressing  Goal: Achieves maximal functionality and self care  Description: INTERVENTIONS  - Monitor swallowing and airway patency with patient fatigue and changes in neurological status  - Encourage and assist patient to increase activity and self care     - Encourage visually impaired, hearing impaired and aphasic patients to use assistive/communication devices  Outcome: Progressing     Problem: CARDIOVASCULAR - ADULT  Goal: Maintains optimal cardiac output and hemodynamic stability  Description: INTERVENTIONS:  - Monitor I/O, vital signs and rhythm  - Monitor for S/S and trends of decreased cardiac output  - Administer and titrate ordered vasoactive medications to optimize hemodynamic stability  - Assess quality of pulses, skin color and temperature  - Assess for signs of decreased coronary artery perfusion  - Instruct patient to report change in severity of symptoms  Outcome: Progressing  Goal: Absence of cardiac dysrhythmias or at baseline rhythm  Description: INTERVENTIONS:  - Continuous cardiac monitoring, vital signs, obtain 12 lead EKG if ordered  - Administer antiarrhythmic and heart rate control medications as ordered  - Monitor electrolytes and administer replacement therapy as ordered  Outcome: Progressing     Problem: RESPIRATORY - ADULT  Goal: Achieves optimal ventilation and oxygenation  Description: INTERVENTIONS:  - Assess for changes in respiratory status  - Assess for changes in mentation and behavior  - Position to facilitate oxygenation and minimize respiratory effort  - Oxygen administered by appropriate delivery if ordered  - Initiate smoking cessation education as indicated  - Encourage broncho-pulmonary hygiene including cough, deep breathe, Incentive Spirometry  - Assess the need for suctioning and aspirate as needed  - Assess and instruct to report SOB or any respiratory difficulty  - Respiratory Therapy support as indicated  Outcome: Progressing     Problem: GENITOURINARY - ADULT  Goal: Maintains or returns to baseline urinary function  Description: INTERVENTIONS:  - Assess urinary function  - Encourage oral fluids to ensure adequate hydration if ordered  - Administer IV fluids as ordered to ensure adequate hydration  - Administer ordered medications as needed  - Offer frequent toileting  - Follow urinary retention protocol if ordered  Outcome: Progressing  Goal: Absence of urinary retention  Description: INTERVENTIONS:  - Assess patient’s ability to void and empty bladder  - Monitor I/O  - Bladder scan as needed  - Discuss with physician/AP medications to alleviate retention as needed  - Discuss catheterization for long term situations as appropriate  Outcome: Progressing     Problem: METABOLIC, FLUID AND ELECTROLYTES - ADULT  Goal: Electrolytes maintained within normal limits  Description: INTERVENTIONS:  - Monitor labs and assess patient for signs and symptoms of electrolyte imbalances  - Administer electrolyte replacement as ordered  - Monitor response to electrolyte replacements, including repeat lab results as appropriate  - Instruct patient on fluid and nutrition as appropriate  Outcome: Progressing  Goal: Fluid balance maintained  Description: INTERVENTIONS:  - Monitor labs   - Monitor I/O and WT  - Instruct patient on fluid and nutrition as appropriate  - Assess for signs & symptoms of volume excess or deficit  Outcome: Progressing  Goal: Glucose maintained within target range  Description: INTERVENTIONS:  - Monitor Blood Glucose as ordered  - Assess for signs and symptoms of hyperglycemia and hypoglycemia  - Administer ordered medications to maintain glucose within target range  - Assess nutritional intake and initiate nutrition service referral as needed  Outcome: Progressing     Problem: HEMATOLOGIC - ADULT  Goal: Maintains hematologic stability  Description: INTERVENTIONS  - Assess for signs and symptoms of bleeding or hemorrhage  - Monitor labs  - Administer supportive blood products/factors as ordered and appropriate  Outcome: Progressing     Problem: MUSCULOSKELETAL - ADULT  Goal: Maintain or return mobility to safest level of function  Description: INTERVENTIONS:  - Assess patient's ability to carry out ADLs; assess patient's baseline for ADL function and identify physical deficits which impact ability to perform ADLs (bathing, care of mouth/teeth, toileting, grooming, dressing, etc )  - Assess/evaluate cause of self-care deficits   - Assess range of motion  - Assess patient's mobility  - Assess patient's need for assistive devices and provide as appropriate  - Encourage maximum independence but intervene and supervise when necessary  - Involve family in performance of ADLs  - Assess for home care needs following discharge   - Consider OT consult to assist with ADL evaluation and planning for discharge  - Provide patient education as appropriate  Outcome: Progressing  Goal: Maintain proper alignment of affected body part  Description: INTERVENTIONS:  - Support, maintain and protect limb and body alignment  - Provide patient/ family with appropriate education  Outcome: Progressing     Problem: PAIN - ADULT  Goal: Verbalizes/displays adequate comfort level or baseline comfort level  Description: Interventions:  - Encourage patient to monitor pain and request assistance  - Assess pain using appropriate pain scale  - Administer analgesics based on type and severity of pain and evaluate response  - Implement non-pharmacological measures as appropriate and evaluate response  - Consider cultural and social influences on pain and pain management  - Notify physician/advanced practitioner if interventions unsuccessful or patient reports new pain  Outcome: Progressing     Problem: INFECTION - ADULT  Goal: Absence or prevention of progression during hospitalization  Description: INTERVENTIONS:  - Assess and monitor for signs and symptoms of infection  - Monitor lab/diagnostic results  - Monitor all insertion sites, i e  indwelling lines, tubes, and drains  - Monitor endotracheal if appropriate and nasal secretions for changes in amount and color  - [place] appropriate cooling/warming therapies per order  - Administer medications as ordered  - Instruct and encourage patient and family to use good hand hygiene technique  - Identify and instruct in appropriate isolation precautions for identified infection/condition  Outcome: Progressing     Problem: SAFETY ADULT  Goal: Maintain or return to baseline ADL function  Description: INTERVENTIONS:  -  Assess patient's ability to carry out ADLs; assess patient's baseline for ADL function and identify physical deficits which impact ability to perform ADLs (bathing, care of mouth/teeth, toileting, grooming, dressing, etc )  - Assess/evaluate cause of self-care deficits   - Assess range of motion  - Assess patient's mobility; develop plan if impaired  - Assess patient's need for assistive devices and provide as appropriate  - Encourage maximum independence but intervene and supervise when necessary  - Involve family in performance of ADLs  - Assess for home care needs following discharge   - Consider OT consult to assist with ADL evaluation and planning for discharge  - Provide patient education as appropriate  Outcome: Progressing  Goal: Maintains/Returns to pre admission functional level  Description: INTERVENTIONS:  - Perform BMAT or MOVE assessment daily    - Set and communicate daily mobility goal to care team and patient/family/caregiver  - Collaborate with rehabilitation services on mobility goals if consulted  - Perform Range of Motion x times a day  - Reposition patient every x hours  - Dangle patient x times a day  - Stand patient x times a day  - Ambulate patient x times a day  - Out of bed to chair x times a day   - Out of bed for meals x times a day  - Out of bed for toileting  - Record patient progress and toleration of activity level   Outcome: Progressing     Problem: DISCHARGE PLANNING  Goal: Discharge to home or other facility with appropriate resources  Description: INTERVENTIONS:  - Identify barriers to discharge w/patient and caregiver  - Arrange for needed discharge resources and transportation as appropriate  - Identify discharge learning needs (meds, wound care, etc )  - Arrange for interpretive services to assist at discharge as needed  - Refer to Case Management Department for coordinating discharge planning if the patient needs post-hospital services based on physician/advanced practitioner order or complex needs related to functional status, cognitive ability, or social support system  Outcome: Progressing     Problem: Knowledge Deficit  Goal: Patient/family/caregiver demonstrates understanding of disease process, treatment plan, medications, and discharge instructions  Description: Complete learning assessment and assess knowledge base    Interventions:  - Provide teaching at level of understanding  - Provide teaching via preferred learning methods  Outcome: Progressing   x

## 2023-04-18 NOTE — UTILIZATION REVIEW
NOTIFICATION OF INPATIENT ADMISSION   AUTHORIZATION REQUEST   SERVICING FACILITY:   [facility]  [address] [place] [id number]  Tax ID: [id number]  NPI: [id number] ATTENDING PROVIDER:  Attending Name and NPI#: Shannen Cabrera Md [[id number]]  Address: [address] [place] [address]  Phone: [phone]   ADMISSION INFORMATION:  Place of Service: [address] [place] [address] Code: 21  Inpatient Admission Date/Time: 4/16/23 10:44 PM  Discharge Date/Time: 4/18/2023  2:06 PM  Admitting Diagnosis Code/Description:  Transient alteration of awareness [R40 4]  Hypertension [I10]  Stroke [facility]) [I63 9]  Hypertensive urgency [I16 0]     UTILIZATION REVIEW CONTACT:  Paolo Martel Utilization   Network Utilization Review Department  Phone: [phone]  Fax [phone]  Email: [email or web address]  Contact for approvals/pending authorizations, clinical reviews, and discharge  PHYSICIAN ADVISORY SERVICES:  Medical Necessity Denial & [employer] Review  Phone: [phone]  Fax: [phone]  Email: [email or web address]

## 2023-04-18 NOTE — SPEECH THERAPY NOTE
Speech Language/Pathology    Speech-Language Pathology Bedside Swallow Evaluation      Patient Name: Dino Beauchamp    Today's Date: 4/18/2023     Problem List  Principal Problem:    Hypertensive encephalopathy, transient  Active Problems:    Primary hypertension    GERD (gastroesophageal reflux disease)    Stenosis of right carotid artery    Urinary retention    Type 2 diabetes mellitus, without long-term current use of insulin (HCC)    Chronic ischemic right MCA stroke    Memory difficulties    Chronic low back pain    Middle cerebral artery stenosis, right    Left posterior MCA stroke - etiology unclear at this time      Past Medical History  Past Medical History:   Diagnosis Date   • Diabetes mellitus ([facility] [address] )    • Dyslipidemia 3/26/2019   • Hypertension    • Stroke [facility])        Past Surgical History  Past Surgical History:   Procedure Laterality Date   • BACK SURGERY     • IR STROKE ALERT  3/19/2019   • SHOULDER SURGERY         Summary   Pt presented with functional appearing oral and pharyngeal stage swallowing skills with materials administered today  Bite strength is adequate for all  Mastication and oral organization is timely and effective  No significant oral residue  Swallows suspected timely  No overt s/s aspiration across trials  Education provided on strategies to optimize swallow safety, the importance of frequent/thorough oral care, and s/s aspiration to notify his medical team of should they arise  Pt demonstrated understanding and denied questions/concerns at this time       Risk/s for Aspiration: Low      Recommended Diet: regular diet and thin liquids   Recommended Form of Meds: whole with liquid   Aspiration precautions and swallowing strategies: upright posture, only feed when fully alert and slow rate of feeding  Other Recommendations: Continue frequent oral care         Current Medical Status  Pt is a 58 y o  male who presented to [address] [place] [address] with HTN, HLD, DM2, prior R MCA CVA s/p thrombectomy in March 2019, known severe R M1 stenosis, prior tobacco use and chronic low back pain admitted with altered mental status on April 16, 2023 after taking muscle relaxant for low back pain (Robaxin per chart review)  While in the ED, patient was noted to develop aphasia and stroke alert was activated  NIHSS 5 at time of alert per review of overnight neurology stroke alert note      Testing completed   CTH completed and demonstrated no acute intracranial abnormality  CTA head and neck completed and demonstrated severe stenosis at the distal R M1 segment similar to prior CTA completed January 12, 2023, but with moderately decreased flow related enhancement of the distal right MCA branches  He has moderate R ICA disease (now 50-60%), asymptomatic       MRI results - evidence of scattered acute ischemic infarcts in L posterior MCA territory indicative of recurrent embolic disease  TTE is pending at this time  Continue on telemetry  He has already had prior work-up including IVY (no PFO) and loop recorder without evidence of Afib  Echo pending  CT of chest abdomen and pelvis with no evidence of malignancy  LDL - 72  Hemoglobin A1 c pending  Thrombosis panel pending  B12 wnl     Etiology of stroke is unclear at this time         Current Precautions:   Seizure      Allergies:  No known food allergies    Past medical history:  Please see H&P for details    Special Studies:  CT chest abdomen pelvis 4/17: No evidence of neoplasm throughout the chest, abdomen or pelvis      Minimal aneurysmal dilatation of infrarenal abdominal aorta up to 3 3 cm        MRI brain 4/17: 1  Punctate acute to subacute infarcts are noted within the left parietal-occipital region      2  Moderate chronic microangiopathic ischemic changes  CTA stroke alert 4/16: Severe stenosis again noted at the distal right M1 segment, likely reflecting partial recanalization from prior thrombotic occlusion    Although the findings are similar to the prior exam, there appears to moderately decreased flow related enhancement of   the distal right MCA branches when compared to the left  An MRI brain is recommended for further evaluation      No evidence of acute thrombus or large vessel occlusion within the major vessels of the [place]      Significant atherosclerotic plaque in the proximal internal carotid arteries, not significantly changed from the prior exam           Social/Education/Vocational Hx:  Pt lives home w/ spouse    Swallow Information   Current Risks for Dysphagia & Aspiration: CVA  Current Diet: regular diet and thin liquids   Baseline Diet: regular diet and thin liquids      Baseline Assessment   Behavior/Cognition: alert  Speech/Language Status: able to participate in conversation and able to follow commands  Patient Positioning: upright in bed  Pain Status/Interventions/Response to Interventions:  No report of or nonverbal indications of pain  Swallow Mechanism Exam  Facial: symmetrical  Labial: WFL  Lingual: WFL  Velum: symmetrical  Mandible: adequate ROM  Dentition: adequate  Vocal quality:clear/adequate   Volitional Cough: strong/productive   Respiratory Status: on RA       Consistencies Assessed and Performance   Consistencies Administered: thin liquids, puree, soft solids and hard solids      Oral Stage: WFL  Mastication was adequate with the materials administered today  Bolus formation and transfer were functional with no significant oral residue noted  No overt s/s reduced oral control  Pharyngeal Stage: WFL  Swallow Mechanics:  Swallowing initiation appeared prompt  Laryngeal rise was palpated and judged to be within functional limits  No coughing, throat clearing, change in vocal quality or respiratory status noted today       Esophageal Concerns: none reported    Strategies and Efficacy: -    Summary and Recommendations (see above)    Results Reviewed with: patient and RN     Treatment Recommended: Not at this time, evaluation only  Please re-consult if medically necessary

## 2023-04-18 NOTE — PROGRESS NOTES
Progress Note - Neurology   Shanell Herrera 58 y o  male MRN: [id number]  Unit/Bed#: -01 Encounter: [id number]    Reviewed with attending plan for stroke prevention will be a baby Aspirin as well as Eliquis  Assessment/Plan   Left posterior MCA stroke - etiology unclear at this time  Assessment & Plan  58year old male with HTN, HLD, DM2, prior R MCA CVA s/p thrombectomy in March 2019, known severe R M1 stenosis, prior tobacco use and chronic low back pain admitted with altered mental status on April 16, 2023 after taking muscle relaxant for low back pain (Robaxin per chart review)  While in the ED, patient was noted to develop aphasia and stroke alert was activated  NIHSS 5 at time of alert per review of overnight neurology stroke alert note  Testing completed   CTH completed and demonstrated no acute intracranial abnormality  CTA head and neck completed and demonstrated severe stenosis at the distal R M1 segment similar to prior CTA completed January 12, 2023, but with moderately decreased flow related enhancement of the distal right MCA branches  He has moderate R ICA disease (now 50-60%), asymptomatic  MRI results - evidence of scattered acute ischemic infarcts in L posterior MCA territory indicative of recurrent embolic disease  TTE is pending at this time  Continue on telemetry  He has already had prior work-up including IVY (no PFO) and loop recorder without evidence of Afib  Echo pending  CT of chest abdomen and pelvis with no evidence of malignancy  LDL - 72  Hemoglobin A1 c pending  Thrombosis panel pending  B12 wnl    Etiology of stroke is unclear at this time  Plan:   - Stroke pathway     - Echo pending  - CT of chest abdomen and pelvis, no evidence of neoplasm  - Thrombosis panel pending  - Continue on ASA 81 mg QD and Plavix 75 mg QD for now, plan is for anticoagulation with Eliquis on 4/19   - Continue on atorvastatin 80 mg QPM    - Follow up with cardiology as out patient in regards to his loop recorder to see if this is still functional    - Follow up with vascular in regards to his right carotid artery stenosis, as out patient (asymptomatic)  - Goal normothermia and euglycemia    - PT/OT   - Stroke education    - Monitor exam and notify with changes  Middle cerebral artery stenosis, right  Assessment & Plan  - Severe R M1 stenosis noted with moderately decreased flow related enhancement of the distal R MCA branches when compared to the left  Chronic low back pain  Assessment & Plan  - Patient notes history of low back pain x 30 years  - Patient reports taking muscle relaxant (Robaxin per chart review) yesterday for the pain and symptoms began shortly after taking this  - Patient notes he is trying to schedule outpatient pain management evaluation as he has seen them in the past for this and underwent STEPHANIE with relief of the pain  He notes most recent injection was about 10 years ago  Chronic ischemic right MCA stroke  Assessment & Plan  - R MCA stroke in March 2019 s/p thrombectomy  - Maintained on Plavix 75 mg QD and atorvastatin 80 mg QPM        Type 2 diabetes mellitus, without long-term current use of insulin ([facility])  Assessment & Plan  Lab Results   Component Value Date    HGBA1C 5 8 (H) 01/13/2023       No results for input(s): POCGLU in the last 72 hours  Blood Sugar Average: Last 72 hrs:    - Goal euglycemia    - Recommend check hemoglobin A1c   - Management as per medicine team       Primary hypertension  Assessment & Plan  - Patient with elevated BP, 237/102 on admission  Anastasia Banerjee will need follow up in in 6 weeks with neurovascular attending  He will not require outpatient neurological testing, at this time  Subjective:   No events overnight  The patient reports the only problem he has since this stroke is that he is having problems with texting     He denies headache, one-sided weakness, one-sided numbness, he does also have lower back "pain which is chronic  He would like to leave the hospital today, as he feels well  Reviewed testing completed and reasons for testing being completed  He reports, he is unsure if his battery in his loop recorder is still working, it was reported to be low battery in the past    No new neurological events currently  Vitals: Blood pressure 160/80, pulse 66, temperature 98 °F (36 7 °C), resp  rate 16, height 5' 8\" (1 727 m), weight 108 kg (237 lb 10 5 oz), SpO2 96 %  ,Body mass index is 36 14 kg/m²       Current Facility-Administered Medications   Medication Dose Route Frequency Provider Last Rate   • acetaminophen  650 mg Oral Q6H PRN ERIC Perez     • amLODIPine  2 5 mg Oral Daily Demetri Dudley MD     • aspirin  81 mg Oral Daily Andrew Dennish [place]     • atorvastatin  80 mg Oral After ONE[facility], KARSONNP     • citalopram  20 mg Oral Daily Skip Yu [place]     • clopidogrel  75 mg Oral Daily Skip Yu [place]     • Diclofenac Sodium  4 g Topical 4x Daily Demetri Dudley MD     • donepezil  5 mg Oral BID ERIC Tse     • enoxaparin  40 mg Subcutaneous Daily ERIC Perez     • hydrALAZINE  5 mg Intravenous Q6H PRN Demetri Dudley MD     • HYDROmorphone  1 mg Intravenous Once PRN Demetri Dudley MD     • ibuprofen  600 mg Oral Q6H PRN ERIC Tse     • lidocaine  2 patch Topical Daily ERIC Sanchez     • metoprolol succinate  100 mg Oral Daily Demetri Dudley MD     • ondansetron  4 mg Intravenous Q6H PRN Beto Nelson CRNP     • pantoprazole  20 mg Oral Early Morning KARSON TseNP     • tamsulosin  0 4 mg Oral Daily With [employer], ERIC     • traMADol  50 mg Oral Q6H PRN Beto Nelson CRNP       Physical Exam:  Neurological  Mental status - the patient is awake alert oriented x 3, with no evidence of aphasia or dysarthria, " patient is able to follow simple commands, is able to follow complex commands  No paraphasic errors noted  Cranial nerves 2 through 12 are intact  Motor - 5/5 upper extremities and lower extremities, without drift, normal tone and bulk, except 4/5 left upper with drift noted  No tremor with outstretched hands  Rapid movements are equal bilaterally  Sensation - nonfocal to touch, no neglect noted  Coordination - no ataxia noted on finger-to-nose  No evidence of seizure activity, observed  Gait - antalgic, non lateralizing with no retropulsion seen   Lab, Imaging and other studies:   I have personally reviewed pertinent reports    , CBC:   Results from last 7 days   Lab Units 04/18/23  0502 04/17/23  0557 04/16/23 2028   WBC Thousand/uL 11 57* 12 34* 14 79*   RBC Million/uL 4 99 4 39 4 90   HEMOGLOBIN g/dL 15 9 14 2 15 8   HEMATOCRIT % 47 7 42 2 46 3   MCV fL 96 96 95   PLATELETS Thousands/uL 246 228 263   , BMP/CMP:   Results from last 7 days   Lab Units 04/18/23  0502 04/17/23  0557 04/16/23 2028   SODIUM mmol/L 136 137 136   POTASSIUM mmol/L 4 3 4 1 3 7   CHLORIDE mmol/L 105 106 106   CO2 mmol/L 23 24 24   BUN mg/dL 15 18 19   CREATININE mg/dL 0 69 0 84 0 83   CALCIUM mg/dL 9 3 8 8 9 1   AST U/L  --  19 20   ALT U/L  --  22 28   ALK PHOS U/L  --  75 89   EGFR ml/min/1 73sq m 100 92 92   , Vitamin B12:   Results from last 7 days   Lab Units 04/17/23  0557   VITAMIN B 12 pg/mL 864   , HgBA1C:   , TSH:   , Coagulation:   Results from last 7 days   Lab Units 04/16/23 2028   INR  0 89   , Lipid Profile:   Results from last 7 days   Lab Units 04/17/23  0557   HDL mg/dL 33*   LDL CALC mg/dL 72   TRIGLYCERIDES mg/dL 176*   , Ammonia:   , Urinalysis:   Results from last 7 days   Lab Units 04/17/23  0243   COLOR UA  Yellow   CLARITY UA  Clear   SPEC GRAV UA  1 025   PH UA  6 0   LEUKOCYTES UA  Negative   NITRITE UA  Negative   GLUCOSE UA mg/dl Negative   KETONES UA mg/dl Negative   BILIRUBIN UA  Negative BLOOD UA  Negative   , Drug Screen:   Results from last 7 days   Lab Units 04/17/23  0244   BARBITURATE UR  Negative   BENZODIAZEPINE UR  Negative   THC UR  Negative   COCAINE UR  Negative   METHADONE URINE  Negative   OPIATE UR  Negative   PCP UR  Negative   , Medication Drug Levels:       Invalid input(s): CARBAMAZEPINE,  PHENOBARB, LACOSAMIDE, OXCARBAZEPINE     Procedure: XR chest 1 view portable    Result Date: 4/17/2023  Narrative: CHEST INDICATION:   Altered mental status  COMPARISON:  Chest radiograph 1/12/2023  EXAM PERFORMED/VIEWS:  XR CHEST PORTABLE  AP semierect FINDINGS:  Left hemithorax loop recorder  Cardiomediastinal silhouette appears unremarkable  The lungs are clear  No pneumothorax or pleural effusion  Osseous structures appear within normal limits for patient age  Impression: No acute cardiopulmonary disease  Workstation performed: [id number]     Procedure: CT head without contrast    Result Date: 4/16/2023  Narrative: CT BRAIN - WITHOUT CONTRAST INDICATION:   Confusion  COMPARISON:  CTA head and neck as well as MRI brain 1/12/2023 TECHNIQUE:  CT examination of the brain was performed  Multiplanar 2D reformatted images were created from the source data  Radiation dose length product (DLP) for this visit:  959 22 mGy-cm  This examination, like all CT scans performed in the [facility], was performed utilizing techniques to minimize radiation dose exposure, including the use of iterative reconstruction and automated exposure control  IMAGE QUALITY:  Diagnostic  FINDINGS: PARENCHYMA:  No intracranial mass, mass effect or midline shift  No CT signs of acute infarction  No acute parenchymal hemorrhage  Mild to moderate cortical atrophy  Periventricular white matter hypodensity which is nonspecific although most compatible with chronic small vessel ischemic disease  Vascular calcifications   VENTRICLES AND EXTRA-AXIAL SPACES:  Enlargement of the ventricles and sulci, similar to previous study and compatible with chronic volume loss  No acute extra-axial fluid collection  VISUALIZED ORBITS: Normal visualized orbits  PARANASAL SINUSES: Mild mucosal thickening of the visualized paranasal sinuses  CALVARIUM AND EXTRACRANIAL SOFT TISSUES:  Normal      Impression: No acute intracranial abnormality or significant interval change from prior study  Chronic microangiopathic changes  Workstation performed: [id number]     Procedure: MRI brain wo contrast    Result Date: 4/17/2023  Narrative: MRI BRAIN WITHOUT CONTRAST INDICATION: stroke  COMPARISON:   CTA head and neck 4/16/2020 TECHNIQUE:  Multiplanar, multisequence imaging of the brain was performed  IMAGE QUALITY:  Diagnostic  FINDINGS: BRAIN PARENCHYMA:  There are punctate foci of restricted diffusion within the left parietal-occipital region compatible with acute to subacute infarcts  There are areas of stenosis involving the proximal aspect of the left inferior division M2 branch and distal right MCA M1 branch on the recent CTA  There is no mass effect or midline shift  There is no intracranial hemorrhage  Small scattered hyperintensities on T2/FLAIR imaging are noted in the periventricular and subcortical white matter demonstrating an appearance that is statistically most likely to represent moderate microangiopathic change  Similar chronic microvascular changes are noted within the central [place]  VENTRICLES:  Enlargement of ventricles and extra-axial CSF spaces, out of proportion to the patient's age most consistent with cerebral and cerebellar atrophy similar to prior study  SELLA AND PITUITARY GLAND:  Normal  ORBITS:  Normal  PARANASAL SINUSES:  Ethmoidal mucosal thickening is noted  The remainder of the visualized paranasal sinus cavities are clear  The mastoid air cells are clear VASCULATURE:  Evaluation of the major intracranial vasculature demonstrates appropriate flow voids   CALVARIUM AND SKULL BASE:  Normal  EXTRACRANIAL SOFT TISSUES:  Normal      Impression: 1  Punctate acute to subacute infarcts are noted within the left parietal-occipital region  2  Moderate chronic microangiopathic ischemic changes  The study was marked in [facility] for immediate notification  Workstation performed: [id number]     Procedure: CT chest abdomen pelvis w contrast    Result Date: 4/18/2023  Narrative: CT CHEST, ABDOMEN AND PELVIS WITH IV CONTRAST INDICATION:   Embolic stroke of unclear etiology, evaluate for malignancy  COMPARISON:  None  TECHNIQUE: CT examination of the chest, abdomen and pelvis was performed  Multiplanar 2D reformatted images were created from the source data  Radiation dose length product (DLP) for this visit:  1704 68 mGy-cm   This examination, like all CT scans performed in the [facility], was performed utilizing techniques to minimize radiation dose exposure, including the use of iterative reconstruction and automated exposure control  IV Contrast:  100 mL of iohexol (OMNIPAQUE) Enteric Contrast: Enteric contrast was administered  FINDINGS: CHEST LUNGS:  Lungs are clear  There is no tracheal or endobronchial lesion  PLEURA:  Unremarkable  HEART/GREAT VESSELS: Heavy atherosclerotic coronary artery calcification is noted  Heart is otherwise unremarkable  No thoracic aortic aneurysm  MEDIASTINUM AND KEYLA:  Unremarkable  CHEST WALL AND LOWER NECK:  Unremarkable  ABDOMEN LIVER/BILIARY TREE:  Unremarkable  GALLBLADDER:  There are gallstone(s) within the gallbladder, without pericholecystic inflammatory changes  SPLEEN:  Unremarkable  PANCREAS:  Unremarkable  ADRENAL GLANDS:  Unremarkable  KIDNEYS/URETERS:  Unremarkable  No hydronephrosis  STOMACH AND BOWEL:  Unremarkable  APPENDIX:  No findings to suggest appendicitis  ABDOMINOPELVIC CAVITY:  No ascites  No pneumoperitoneum  No lymphadenopathy  VESSELS:  Atherosclerotic changes are present  Minimal aneurysmal dilatation of infrarenal abdominal aorta up to 3 3 cm  No evidence of aneurysm  PELVIS REPRODUCTIVE ORGANS:  Unremarkable for patient's age  URINARY BLADDER:  Unremarkable  ABDOMINAL WALL/INGUINAL REGIONS:  Unremarkable  OSSEOUS STRUCTURES:  No acute fracture or destructive osseous lesion  Impression: No evidence of neoplasm throughout the chest, abdomen or pelvis  Minimal aneurysmal dilatation of infrarenal abdominal aorta up to 3 3 cm  Workstation performed: [id number]     Procedure: CT stroke alert brain    Result Date: 4/16/2023  Narrative: CT BRAIN - STROKE ALERT PROTOCOL INDICATION:   Stroke Alert  COMPARISON:  CT brain performed approximately one hour prior  TECHNIQUE:  CT examination of the brain was performed  In addition to axial images, coronal reformatted images were created and submitted for interpretation  Radiation dose length product (DLP) for this visit:  954 28 mGy-cm   This examination, like all CT scans performed in the [facility], was performed utilizing techniques to minimize radiation dose exposure, including the use of iterative  reconstruction and automated exposure control  IMAGE QUALITY:  Diagnostic  FINDINGS:  PARENCHYMA:  No intracranial mass, mass effect or midline shift  No CT signs of acute infarction  No acute parenchymal hemorrhage  Mild to moderate cortical atrophy  Periventricular white matter hypodensity which is nonspecific although most compatible with chronic small vessel ischemic disease  Moderate vascular calcifications again noted  VENTRICLES AND EXTRA-AXIAL SPACES:  Enlargement of the ventricles and sulci, similar to previous study and compatible with chronic volume loss  No acute extra-axial fluid collection  VISUALIZED ORBITS: Normal visualized orbits  PARANASAL SINUSES: Mild mucosal thickening of the visualized paranasal sinuses   CALVARIUM AND EXTRACRANIAL SOFT TISSUES:  Normal      Impression: No acute intracranial abnormality or significant interval change from prior study performed earlier the same day  Stable microangiopathic changes and volume loss  I personally discussed this study with Dr Neha Onofre on 4/16/2023 at 10:09 PM  Workstation performed: [id number]     Procedure: CTA stroke alert (head/neck)    Result Date: 4/16/2023  Narrative: CTA NECK AND BRAIN WITH CONTRAST INDICATION: Stroke Alert COMPARISON:   CTA head and neck dated January 12, 2023  TECHNIQUE:   Post contrast imaging was performed after administration of iodinated contrast through the neck and brain  Post contrast axial 0 625 mm images timed to opacify the arterial system  3D rendering was performed on an independent workstation  MIP reconstructions performed  Coronal reconstructions were performed of the noncontrast portion of the brain  Radiation dose length product (DLP) for this visit:  494 99 mGy-cm   This examination, like all CT scans performed in the [facility], was performed utilizing techniques to minimize radiation dose exposure, including the use of iterative  reconstruction and automated exposure control  IV Contrast:  85 mL of iohexol (OMNIPAQUE)  IMAGE QUALITY:   Diagnostic FINDINGS: CERVICAL VASCULATURE AORTIC ARCH AND GREAT VESSELS:  Three-vessel configuration of the aortic arch  No stenosis in the subclavian arteries RIGHT VERTEBRAL ARTERY CERVICAL SEGMENT:  Normal origin  The vessel is normal in caliber throughout the neck  The right vertebral artery is dominant  LEFT VERTEBRAL ARTERY CERVICAL SEGMENT:  Normal origin  The vessel is small in caliber throughout the neck  RIGHT EXTRACRANIAL CAROTID SEGMENT:  Normal caliber common carotid artery  Calcified and noncalcified plaque in the proximal internal carotid artery results in stable moderate (50-69%) stenosis  Remainder of the cervical segment is patent and normal in  caliber  LEFT EXTRACRANIAL CAROTID SEGMENT:  Normal caliber common carotid artery    Calcified and noncalcified plaque in the proximal internal carotid artery results in stable moderate stenosis  Remainder of the cervical segment is patent and normal in caliber  NASCET criteria was used to determine the degree of internal carotid artery diameter stenosis  INTRACRANIAL VASCULATURE INTERNAL CAROTID ARTERIES:  Calcified plaque in the carotid siphons without hemodynamically significant stenosis  ANTERIOR CIRCULATION:  Symmetric A1 segments and anterior cerebral arteries with normal enhancement  Normal anterior communicating artery  MIDDLE CEREBRAL ARTERY CIRCULATION:  Stable critical (greater than 90%) stenosis in the distal right M1 segment with reperfusion of distal branches  However, there appears to be moderately decreased flow related enhancement within the distal right MCA branches when compared to the left  DISTAL VERTEBRAL ARTERIES:  There is mild atherosclerotic calcific plaquing involving the proximal left V4 segment with no significant stenosis  The distal right vertebral artery is dominant  Posterior inferior cerebellar arteries are patent  BASILAR ARTERY:  Basilar artery is normal in caliber  Patent superior cerebellar arteries  POSTERIOR CEREBRAL ARTERIES: Right and tiny left posterior communicating arteries identified  No stenosis in the posterior cerebral arteries  VENOUS STRUCTURES:  Patent dural venous sinuses  NON VASCULAR ANATOMY BONY STRUCTURES:  No lytic or blastic lesion  SOFT TISSUES OF THE NECK: Hypoenhancing 1 2 cm nodule in the right lobe of the thyroid, stable  Incidental discovery of one or more thyroid nodule(s) measuring less than 1 5 cm and without suspicious features is noted in this patient who is above 28years old; according to guidelines published in the February 2015 white paper on incidental thyroid nodules in the Journal of the [employer] of Radiology [facility]), no further evaluation is recommended  THORACIC INLET:  Clear lung apices       Impression: Severe stenosis again noted at the distal right M1 segment, likely reflecting partial recanalization from prior thrombotic occlusion  Although the findings are similar to the prior exam, there appears to moderately decreased flow related enhancement of the distal right MCA branches when compared to the left  An MRI brain is recommended for further evaluation  No evidence of acute thrombus or large vessel occlusion within the major vessels of the [place]  Significant atherosclerotic plaque in the proximal internal carotid arteries, not significantly changed from the prior exam   I personally discussed this study with Dr Ac Diaz on 4/16/2023 at 10:09 PM  Workstation performed: [id number]     Counseling / Coordination of Care  Reviewed case with neurology attending, history and physical examination, labs and imaging completed, plan of care as per attending physician  Please see attestation for further details

## 2023-04-18 NOTE — CASE MANAGEMENT
Case Management Discharge Planning Note    Patient name Vicente Chandler  Location [facility] [address] [phone]/-[id number] MRN [id number]  : 1960 Date 2023       Current Admission Date: 2023  Current Admission Diagnosis:Hypertensive encephalopathy, transient   Patient Active Problem List    Diagnosis Date Noted   • Middle cerebral artery stenosis, right 2023   • Left posterior MCA stroke - etiology unclear at this time 2023   • Chronic low back pain 2023   • Hypertensive encephalopathy, transient 2023   • Snoring 08/10/2020   • Memory difficulties 08/10/2020   • Chronic ischemic right MCA stroke 2019   • Status post placement of implantable loop recorder 2019   • Type 2 diabetes mellitus, without long-term current use of insulin ([facility] ) 2019   • Adjustment reaction with anxiety 2019   • Insomnia 2019   • Fall 2019   • 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 ([facility] ) 2019   • Constipation 2019   • Urinary retention 2019   • Hypertriglyceridemia 2019   • Nicotine dependence 2019   • Stenosis of right carotid artery 2019   • Presbyopia 2019   • History of stroke 2019   • Headache 2019   • Primary hypertension 2019   • GERD (gastroesophageal reflux disease) 2019      LOS (days): 2  Geometric Mean LOS (GMLOS) (days):   Days to GMLOS:     OBJECTIVE:  Risk of Unplanned Readmission Score: 7 92         Current admission status: Inpatient   Preferred Pharmacy:   [facility] [address] [place] [address] [address]  [address] [facility]  Phone: [phone] [facility]ax[facility] [address] [place] [address]  Phone: [phone] Fax: [phone]    Primary Care Provider: ERIC Yeager    Primary Insurance: BLUE CROSS  Secondary Insurance: [place] [id number] DETAILS:      IMM Given (Date):: 04/18/23  IMM Given to[de-identified] Patient   IMM reviewed with patient, patient agrees with discharge determination

## 2023-04-18 NOTE — PLAN OF CARE
Problem: Neurological Deficit  Goal: Neurological status is stable or improving  Description: Interventions:  - Monitor and assess patient's level of consciousness, motor function, sensory function, and level of assistance needed for ADLs  - Monitor and report changes from baseline  Collaborate with interdisciplinary team to initiate plan and implement interventions as ordered  - Provide and maintain a safe environment  - Consider seizure precautions  - Consider fall precautions  - Consider aspiration precautions  - Consider bleeding precautions  Outcome: Progressing     Problem: Activity Intolerance/Impaired Mobility  Goal: Mobility/activity is maintained at optimum level for patient  Description: Interventions:  - Assess and monitor patient  barriers to mobility and need for assistive/adaptive devices  - Assess patient's emotional response to limitations  - Collaborate with interdisciplinary team and initiate plans and interventions as ordered  - Encourage independent activity per ability   - Maintain proper body alignment  - Perform active/passive rom as tolerated/ordered  - Plan activities to conserve energy   - Turn patient as appropriate  Outcome: Progressing     Problem: Communication Impairment  Goal: Ability to express needs and understand communication  Description: Assess patient's communication skills and ability to understand information  Patient will demonstrate use of effective communication techniques, alternative methods of communication and understanding even if not able to speak  - Encourage communication and provide alternate methods of communication as needed  - Collaborate with case management/ for discharge needs  - Include patient/family/caregiver in decisions related to communication    Outcome: Progressing     Problem: Potential for Aspiration  Goal: Non-ventilated patient's risk of aspiration is minimized  Description: Assess and monitor vital signs, respiratory status, and labs (WBC)  Monitor for signs of aspiration (tachypnea, cough, rales, wheezing, cyanosis, fever)  - Assess and monitor patient's ability to swallow  - Place patient up in chair to eat if possible  - HOB up at 90 degrees to eat if unable to get patient up into chair   - Supervise patient during oral intake  - Instruct patient/ family to take small bites  - Instruct patient/ family to take small single sips when taking liquids    - Follow patient-specific strategies generated by speech pathologist   Outcome: Progressing

## 2023-04-18 NOTE — PHYSICAL THERAPY NOTE
PHYSICAL THERAPY SCREEN NOTE    Patient Name: Jim Yates  ETHCX'I Date: 4/18/2023 04/18/23 4852   Note Type   Note type Screen   Additional Comments PT orders received, chart review performed  Pt is independently ambulating around room and reports eagerness to go home   Pt w/ no concerns re: functional mobility, will DC PT       Carissa Emerson, PT, DPT

## 2023-04-18 NOTE — PROGRESS NOTES
Waiting on the Cardiologist to read echo so that patient can be discharged  Made Sneha Lopes PA-C aware who went and spoke with the patient

## 2023-04-19 NOTE — UTILIZATION REVIEW
NOTIFICATION OF ADMISSION DISCHARGE   This is a Notification of Discharge from [address]  Please be advised that this patient has been discharge from our facility  Below you will find the admission and discharge date and time including the patient’s disposition  UTILIZATION REVIEW CONTACT:  Kota Dumont  Utilization   Network Utilization Review Department  Phone: [phone] x carefully listen to the prompts  All voicemails are confidential   Email: [email or web address]     ADMISSION INFORMATION  PRESENTATION DATE: 4/16/2023  8:16 PM  OBERVATION ADMISSION DATE:   INPATIENT ADMISSION DATE: 4/16/23 10:44 PM   DISCHARGE DATE: 4/18/2023  2:06 PM   DISPOSITION:Home/Self Care    IMPORTANT INFORMATION:  Send all requests for admission clinical reviews, approved or denied determinations and any other requests to dedicated fax number below belonging to the campus where the patient is receiving treatment   List of dedicated fax numbers:  [address] DENIALS (Administrative/Medical Necessity) [phone]   [address] (Maternity/NICU/Pediatrics) [phone]   [facility] [phone]   [facility] 87 [phone]   [address] [phone]   220 [facility] [phone]   90 [facility] [phone]   [address] [place] [address] [phone]   [facility]  [phone]   40[address] [facility] [phone]   412 [facility] 850 [address] [facility] [phone]

## 2023-04-20 LAB
EST. AVERAGE GLUCOSE BLD GHB EST-MCNC: 128 MG/DL
HBA1C MFR BLD: 6.1 %
PROT C AG ACT/NOR PPP IA: 136 % OF NORMAL (ref 60–150)
PROT S ACT/NOR PPP: 106 % (ref 61–136)
PROT S ACT/NOR PPP: 109 % (ref 71–117)
PROT S PPP-ACNC: 96 % (ref 60–150)

## 2023-04-21 LAB
APTT SCREEN TO CONFIRM RATIO: 0.89 RATIO (ref 0–1.34)
B2 GLYCOPROT1 IGA SERPL IA-ACNC: 2.7
B2 GLYCOPROT1 IGG SERPL IA-ACNC: 1.7
B2 GLYCOPROT1 IGM SERPL IA-ACNC: <2.4
CARDIOLIPIN IGA SER IA-ACNC: 1.9
CARDIOLIPIN IGG SER IA-ACNC: 0.8
CARDIOLIPIN IGM SER IA-ACNC: 2.6
CONFIRM APTT/NORMAL: 34.1 SEC (ref 0–47.6)
LA PPP-IMP: NORMAL
SCREEN APTT: 34.8 SEC (ref 0–43.5)
SCREEN DRVVT: 38 SEC (ref 0–47)
THROMBIN TIME: 14.4 SEC (ref 0–23)

## 2023-04-24 ENCOUNTER — TELEPHONE (OUTPATIENT)
Dept: NEUROLOGY | Facility: CLINIC | Age: 63
End: 2023-04-24

## 2023-04-24 DIAGNOSIS — Z86.73 HISTORY OF STROKE: Primary | ICD-10-CM

## 2023-04-24 LAB
F2 GENE MUT ANL BLD/T: NORMAL
F5 GENE MUT ANL BLD/T: NORMAL

## 2023-04-24 NOTE — TELEPHONE ENCOUNTER
[facility]  Patient is scheduled with Dr Grace Martinez on 6/13/23 at [address] [place] [address] in [facility] will need follow up in in 6 weeks with neurovascular attending  He will not require outpatient neurological testing, at this time

## 2023-04-25 NOTE — TELEPHONE ENCOUNTER
ABDOULAYE davis'olamide at 16:26:    Hello, my name's soni patino, i'm calling on behalf of [employer] date of birth  [birth year]  He  had suffered a minor stroke  last sunday and he was released from the hospital  He is still struggling with trying to text, so he knows pin numbers, but he can't articulate  So I'm wondering, he does not come back for a follow up until June 13th   I wanted  to start him on some kind of therapy for him as this is the 2nd stroke  And I understand that he needs to get those things done  So if you could please call me back at [phone]  Thank you        LOV 1/20/23

## 2023-04-26 ENCOUNTER — APPOINTMENT (OUTPATIENT)
Dept: RADIOLOGY | Facility: CLINIC | Age: 63
End: 2023-04-26

## 2023-04-26 ENCOUNTER — CONSULT (OUTPATIENT)
Dept: PAIN MEDICINE | Facility: CLINIC | Age: 63
End: 2023-04-26

## 2023-04-26 ENCOUNTER — TELEPHONE (OUTPATIENT)
Dept: PAIN MEDICINE | Facility: CLINIC | Age: 63
End: 2023-04-26

## 2023-04-26 ENCOUNTER — HOSPITAL ENCOUNTER (INPATIENT)
Facility: HOSPITAL | Age: 63
LOS: 16 days | End: 2023-05-12
Attending: EMERGENCY MEDICINE | Admitting: INTERNAL MEDICINE

## 2023-04-26 ENCOUNTER — APPOINTMENT (INPATIENT)
Dept: RADIOLOGY | Facility: HOSPITAL | Age: 63
End: 2023-04-26

## 2023-04-26 ENCOUNTER — HOSPITAL ENCOUNTER (EMERGENCY)
Facility: HOSPITAL | Age: 63
Discharge: STILL ADMITTED OR ADMINISTRATIVE | End: 2023-04-26
Admitting: INTERNAL MEDICINE

## 2023-04-26 VITALS
HEART RATE: 88 BPM | SYSTOLIC BLOOD PRESSURE: 168 MMHG | DIASTOLIC BLOOD PRESSURE: 96 MMHG | TEMPERATURE: 98 F | WEIGHT: 227 LBS | BODY MASS INDEX: 34.4 KG/M2 | HEIGHT: 68 IN

## 2023-04-26 DIAGNOSIS — E11.9 TYPE 2 DIABETES MELLITUS WITHOUT COMPLICATION, WITHOUT LONG-TERM CURRENT USE OF INSULIN (HCC): ICD-10-CM

## 2023-04-26 DIAGNOSIS — Z79.01 CHRONIC ANTICOAGULATION: ICD-10-CM

## 2023-04-26 DIAGNOSIS — I25.3 ATRIAL SEPTAL ANEURYSM: ICD-10-CM

## 2023-04-26 DIAGNOSIS — R41.0 CONFUSION: ICD-10-CM

## 2023-04-26 DIAGNOSIS — K59.00 CONSTIPATION: ICD-10-CM

## 2023-04-26 DIAGNOSIS — R79.89 ELEVATED D-DIMER: ICD-10-CM

## 2023-04-26 DIAGNOSIS — G81.90 HEMIPLEGIA OF NONDOMINANT SIDE DUE TO ACUTE STROKE (HCC): ICD-10-CM

## 2023-04-26 DIAGNOSIS — M54.50 ACUTE MIDLINE LOW BACK PAIN WITHOUT SCIATICA: ICD-10-CM

## 2023-04-26 DIAGNOSIS — I77.6 VASCULITIS (HCC): ICD-10-CM

## 2023-04-26 DIAGNOSIS — I65.23 CAROTID STENOSIS, BILATERAL: ICD-10-CM

## 2023-04-26 DIAGNOSIS — R33.9 URINARY RETENTION: ICD-10-CM

## 2023-04-26 DIAGNOSIS — I63.9 STROKE OF UNCERTAIN PATHOLOGY (HCC): ICD-10-CM

## 2023-04-26 DIAGNOSIS — I10 PRIMARY HYPERTENSION: ICD-10-CM

## 2023-04-26 DIAGNOSIS — I63.9 HEMIPLEGIA OF NONDOMINANT SIDE DUE TO ACUTE STROKE (HCC): ICD-10-CM

## 2023-04-26 DIAGNOSIS — M54.50 ACUTE MIDLINE LOW BACK PAIN WITHOUT SCIATICA: Primary | ICD-10-CM

## 2023-04-26 DIAGNOSIS — F17.210 CIGARETTE NICOTINE DEPENDENCE WITHOUT COMPLICATION: ICD-10-CM

## 2023-04-26 DIAGNOSIS — G89.29 CHRONIC LOW BACK PAIN, UNSPECIFIED BACK PAIN LATERALITY, UNSPECIFIED WHETHER SCIATICA PRESENT: ICD-10-CM

## 2023-04-26 DIAGNOSIS — M54.50 CHRONIC LOW BACK PAIN, UNSPECIFIED BACK PAIN LATERALITY, UNSPECIFIED WHETHER SCIATICA PRESENT: ICD-10-CM

## 2023-04-26 DIAGNOSIS — I63.9 STROKE (HCC): Primary | ICD-10-CM

## 2023-04-26 PROBLEM — E87.1 HYPONATREMIA: Status: ACTIVE | Noted: 2023-04-26

## 2023-04-26 PROBLEM — R29.90 STROKE-LIKE SYMPTOMS: Status: ACTIVE | Noted: 2023-04-26

## 2023-04-26 LAB
2HR DELTA HS TROPONIN: 1 NG/L
4HR DELTA HS TROPONIN: 2 NG/L
ANION GAP SERPL CALCULATED.3IONS-SCNC: 5 MMOL/L (ref 4–13)
APTT PPP: 34 SECONDS (ref 23–37)
BUN SERPL-MCNC: 25 MG/DL (ref 5–25)
CALCIUM SERPL-MCNC: 8.9 MG/DL (ref 8.3–10.1)
CARDIAC TROPONIN I PNL SERPL HS: 13 NG/L
CARDIAC TROPONIN I PNL SERPL HS: 14 NG/L
CARDIAC TROPONIN I PNL SERPL HS: 15 NG/L
CHLORIDE SERPL-SCNC: 100 MMOL/L (ref 96–108)
CO2 SERPL-SCNC: 25 MMOL/L (ref 21–32)
CREAT SERPL-MCNC: 1.15 MG/DL (ref 0.6–1.3)
ERYTHROCYTE [DISTWIDTH] IN BLOOD BY AUTOMATED COUNT: 13 % (ref 11.6–15.1)
GFR SERPL CREATININE-BSD FRML MDRD: 66 ML/MIN/1.73SQ M
GLUCOSE SERPL-MCNC: 100 MG/DL (ref 65–140)
GLUCOSE SERPL-MCNC: 123 MG/DL (ref 65–140)
GLUCOSE SERPL-MCNC: 125 MG/DL (ref 65–140)
HCT VFR BLD AUTO: 45.2 % (ref 36.5–49.3)
HGB BLD-MCNC: 15.9 G/DL (ref 12–17)
INR PPP: 1.15 (ref 0.84–1.19)
MAGNESIUM SERPL-MCNC: 2.4 MG/DL (ref 1.6–2.6)
MCH RBC QN AUTO: 32.4 PG (ref 26.8–34.3)
MCHC RBC AUTO-ENTMCNC: 35.2 G/DL (ref 31.4–37.4)
MCV RBC AUTO: 92 FL (ref 82–98)
PHOSPHATE SERPL-MCNC: 3.5 MG/DL (ref 2.3–4.1)
PLATELET # BLD AUTO: 316 THOUSANDS/UL (ref 149–390)
PMV BLD AUTO: 9.8 FL (ref 8.9–12.7)
POTASSIUM SERPL-SCNC: 3.7 MMOL/L (ref 3.5–5.3)
PROTHROMBIN TIME: 14.9 SECONDS (ref 11.6–14.5)
RBC # BLD AUTO: 4.91 MILLION/UL (ref 3.88–5.62)
SODIUM SERPL-SCNC: 130 MMOL/L (ref 135–147)
WBC # BLD AUTO: 12.38 THOUSAND/UL (ref 4.31–10.16)

## 2023-04-26 RX ORDER — PANTOPRAZOLE SODIUM 20 MG/1
20 TABLET, DELAYED RELEASE ORAL
Status: DISCONTINUED | OUTPATIENT
Start: 2023-04-27 | End: 2023-05-12 | Stop reason: HOSPADM

## 2023-04-26 RX ORDER — SENNOSIDES 8.6 MG
2 TABLET ORAL DAILY PRN
Status: DISCONTINUED | OUTPATIENT
Start: 2023-04-26 | End: 2023-04-30

## 2023-04-26 RX ORDER — CALCIUM CARBONATE 500 MG/1
1000 TABLET, CHEWABLE ORAL DAILY PRN
Status: DISCONTINUED | OUTPATIENT
Start: 2023-04-26 | End: 2023-05-12 | Stop reason: HOSPADM

## 2023-04-26 RX ORDER — ONDANSETRON 2 MG/ML
4 INJECTION INTRAMUSCULAR; INTRAVENOUS EVERY 6 HOURS PRN
Status: DISCONTINUED | OUTPATIENT
Start: 2023-04-26 | End: 2023-05-12 | Stop reason: HOSPADM

## 2023-04-26 RX ORDER — INSULIN LISPRO 100 [IU]/ML
1-6 INJECTION, SOLUTION INTRAVENOUS; SUBCUTANEOUS
Status: DISCONTINUED | OUTPATIENT
Start: 2023-04-26 | End: 2023-05-07

## 2023-04-26 RX ORDER — LOSARTAN POTASSIUM 50 MG/1
50 TABLET ORAL DAILY
COMMUNITY

## 2023-04-26 RX ORDER — DOCUSATE SODIUM 100 MG/1
100 CAPSULE, LIQUID FILLED ORAL 2 TIMES DAILY
Status: DISCONTINUED | OUTPATIENT
Start: 2023-04-26 | End: 2023-04-30

## 2023-04-26 RX ORDER — HYDRALAZINE HYDROCHLORIDE 25 MG/1
25 TABLET, FILM COATED ORAL 3 TIMES DAILY
Status: DISCONTINUED | OUTPATIENT
Start: 2023-04-26 | End: 2023-05-10

## 2023-04-26 RX ORDER — ACETAMINOPHEN 325 MG/1
650 TABLET ORAL EVERY 6 HOURS PRN
Status: DISCONTINUED | OUTPATIENT
Start: 2023-04-26 | End: 2023-04-30

## 2023-04-26 RX ORDER — SODIUM CHLORIDE 9 MG/ML
100 INJECTION, SOLUTION INTRAVENOUS CONTINUOUS
Status: DISPENSED | OUTPATIENT
Start: 2023-04-26 | End: 2023-04-26

## 2023-04-26 RX ORDER — DONEPEZIL HYDROCHLORIDE 5 MG/1
5 TABLET, FILM COATED ORAL 2 TIMES DAILY
Status: DISCONTINUED | OUTPATIENT
Start: 2023-04-26 | End: 2023-05-12 | Stop reason: HOSPADM

## 2023-04-26 RX ORDER — AMLODIPINE BESYLATE 10 MG/1
10 TABLET ORAL DAILY
Status: DISCONTINUED | OUTPATIENT
Start: 2023-04-27 | End: 2023-05-10

## 2023-04-26 RX ORDER — ATORVASTATIN CALCIUM 40 MG/1
40 TABLET, FILM COATED ORAL DAILY
Status: DISCONTINUED | OUTPATIENT
Start: 2023-04-27 | End: 2023-05-12 | Stop reason: HOSPADM

## 2023-04-26 RX ORDER — LOSARTAN POTASSIUM 50 MG/1
50 TABLET ORAL DAILY
Status: DISCONTINUED | OUTPATIENT
Start: 2023-04-27 | End: 2023-05-12 | Stop reason: HOSPADM

## 2023-04-26 RX ORDER — CITALOPRAM 20 MG/1
20 TABLET ORAL DAILY
Status: DISCONTINUED | OUTPATIENT
Start: 2023-04-27 | End: 2023-05-12 | Stop reason: HOSPADM

## 2023-04-26 RX ORDER — HYDRALAZINE HYDROCHLORIDE 25 MG/1
25 TABLET, FILM COATED ORAL 3 TIMES DAILY
Status: ON HOLD | COMMUNITY

## 2023-04-26 RX ORDER — TAMSULOSIN HYDROCHLORIDE 0.4 MG/1
0.4 CAPSULE ORAL
Status: DISCONTINUED | OUTPATIENT
Start: 2023-04-26 | End: 2023-05-12 | Stop reason: HOSPADM

## 2023-04-26 RX ORDER — METOPROLOL SUCCINATE 100 MG/1
100 TABLET, EXTENDED RELEASE ORAL DAILY
Status: DISCONTINUED | OUTPATIENT
Start: 2023-04-27 | End: 2023-05-12 | Stop reason: HOSPADM

## 2023-04-26 RX ORDER — HYDROCHLOROTHIAZIDE 25 MG/1
25 TABLET ORAL DAILY
Status: ON HOLD | COMMUNITY

## 2023-04-26 RX ORDER — METOPROLOL SUCCINATE 100 MG/1
100 TABLET, EXTENDED RELEASE ORAL DAILY
Status: ON HOLD | COMMUNITY

## 2023-04-26 RX ORDER — LIDOCAINE 50 MG/G
1 PATCH TOPICAL DAILY
Qty: 30 PATCH | Refills: 2 | Status: ON HOLD | OUTPATIENT
Start: 2023-04-26

## 2023-04-26 RX ADMIN — SODIUM CHLORIDE 100 ML/HR: 0.9 INJECTION, SOLUTION INTRAVENOUS at 18:37

## 2023-04-26 RX ADMIN — ACETAMINOPHEN 650 MG: 325 TABLET ORAL at 18:37

## 2023-04-26 RX ADMIN — HYDRALAZINE HYDROCHLORIDE 25 MG: 25 TABLET, FILM COATED ORAL at 21:21

## 2023-04-26 RX ADMIN — DOCUSATE SODIUM 100 MG: 100 CAPSULE, LIQUID FILLED ORAL at 18:37

## 2023-04-26 RX ADMIN — DONEPEZIL HYDROCHLORIDE 5 MG: 5 TABLET ORAL at 18:37

## 2023-04-26 RX ADMIN — APIXABAN 5 MG: 5 TABLET, FILM COATED ORAL at 18:37

## 2023-04-26 NOTE — ED PROVIDER NOTES
"History  Chief Complaint   Patient presents with   • STROKE Alert     Pt brought in for unresp episode at home, new L facial droop, word difficulties/incorrect words; hx recent CVA; last known well 26     79-year-old male with a past medical history of depression, diabetes, hyperlipidemia, hypertension, and a chronic right MCA stroke presents with strokelike symptoms  Patient was a prehospital stroke alert secondary to the symptoms  Patient was in the hospital recently and discharged on 4/16 due to symptoms thought to be related to a chronic right MCA infarct  The infarct was first noted in 2019 and he had a thrombectomy at that time  Patient apparently has left-sided residual weakness from his stroke  EMS stated that patient's wife said that he went unresponsive for a couple of minutes today  When EMS got there they said he was \"unresponsive\", but that he responded to pain  They state that patient woke up more on the ride over  They noted a left-sided facial droop  Patient has never had a left-sided facial droop  They also noticed some word finding difficulties, which she stated improved throughout the car ride  Prior to Admission Medications   Prescriptions Last Dose Informant Patient Reported? Taking? amLODIPine (NORVASC) 10 mg tablet 4/26/2023  Yes Yes   Sig: Take 10 mg by mouth daily   apixaban (Eliquis) 5 mg 4/26/2023  No Yes   Sig: Take 1 tablet (5 mg total) by mouth 2 (two) times a day   aspirin (ECOTRIN LOW STRENGTH) 81 mg EC tablet 4/26/2023  No Yes   Sig: Take 1 tablet (81 mg total) by mouth daily Do not start before April 19, 2023     atorvastatin (LIPITOR) 40 mg tablet 4/26/2023  Yes Yes   Sig: Take 40 mg by mouth daily with breakfast   citalopram (CeleXA) 20 mg tablet 4/26/2023  Yes Yes   Sig: Take 20 mg by mouth daily   donepezil (ARICEPT) 5 mg tablet 4/26/2023  No Yes   Sig: TAKE 1 TABLET TWICE A DAY   hydrALAZINE (APRESOLINE) 25 mg tablet 4/26/2023  Yes Yes   Sig: Take 25 mg by " mouth 3 (three) times a day   hydrochlorothiazide (HYDRODIURIL) 25 mg tablet 4/26/2023  Yes Yes   Sig: Take 25 mg by mouth daily   lidocaine (Lidoderm) 5 % 4/26/2023  No Yes   Sig: Apply 1 patch topically over 12 hours daily Remove & Discard patch within 12 hours or as directed by MD   losartan (COZAAR) 50 mg tablet 4/26/2023  Yes Yes   Sig: Take 50 mg by mouth daily   metFORMIN (GLUCOPHAGE) 500 mg tablet 4/26/2023  No Yes   Sig: Take 1 tablet (500 mg total) by mouth daily with breakfast   metoprolol succinate (TOPROL-XL) 100 mg 24 hr tablet 4/26/2023  Yes Yes   Sig: Take 100 mg by mouth daily   omeprazole (PriLOSEC OTC) 20 MG tablet 4/26/2023  Yes Yes   Sig: Take 20 mg by mouth daily   sildenafil (REVATIO) 20 mg tablet Past Month  Yes Yes   Sig: TAKE 2 3 TABLETS BY MOUTH AS NEEDED FOR SEXUAL ACTIVITY   tamsulosin (FLOMAX) 0 4 mg 4/26/2023  Yes Yes   Sig: Take 0 4 mg by mouth daily with breakfast      Facility-Administered Medications: None       Past Medical History:   Diagnosis Date   • Depression    • Diabetes mellitus ([facility] [address] )    • Dyslipidemia 03/26/2019   • Hyperlipidemia    • Hypertension    • Stroke [facility])        Past Surgical History:   Procedure Laterality Date   • BACK SURGERY     • IR STROKE ALERT  3/19/2019   • SHOULDER SURGERY         Family History   Problem Relation Age of Onset   • Heart attack Mother    • Diabetes Mother    • Prostate cancer Father      I have reviewed and agree with the history as documented      E-Cigarette/Vaping   • E-Cigarette Use Current Some Day User      E-Cigarette/Vaping Substances   • Nicotine No    • THC No    • CBD No    • Flavoring No    • Other No    • Unknown No      Social History     Tobacco Use   • Smoking status: Former   • Smokeless tobacco: Never   Vaping Use   • Vaping Use: Some days   Substance Use Topics   • Alcohol use: Not Currently   • Drug use: Never        Review of Systems   Unable to perform ROS: Mental status change       Physical Exam  ED Triage Vitals Temperature Pulse Respirations Blood Pressure SpO2   04/26/23 1525 04/26/23 1525 04/26/23 1525 04/26/23 1525 04/26/23 1525   98 7 °F (37 1 °C) 77 16 154/71 97 %      Temp Source Heart Rate Source Patient Position - Orthostatic VS BP Location FiO2 (%)   04/26/23 1525 04/26/23 1525 04/26/23 1751 04/26/23 1751 --   Tympanic Monitor Lying Left arm       Pain Score       04/26/23 1830       4             Orthostatic Vital Signs  Vitals:    04/27/23 0427 04/27/23 0601 04/27/23 0911 04/27/23 1206   BP: 149/74 162/74 (!) 177/84 (!) 158/129   Pulse: 75 70 87 81   Patient Position - Orthostatic VS:           Physical Exam  Vitals and nursing note reviewed  Constitutional:       General: He is not in acute distress  Appearance: Normal appearance  He is not ill-appearing  HENT:      Head: Normocephalic and atraumatic  Nose: Nose normal    Eyes:      Conjunctiva/sclera: Conjunctivae normal    Cardiovascular:      Rate and Rhythm: Normal rate and regular rhythm  Pulmonary:      Effort: Pulmonary effort is normal  No respiratory distress  Abdominal:      General: Abdomen is flat  Palpations: Abdomen is soft  Musculoskeletal:         General: Normal range of motion  Cervical back: Normal range of motion and neck supple  Skin:     General: Skin is warm and dry  Neurological:      General: No focal deficit present  Mental Status: He is alert  He is disoriented and confused  Cranial Nerves: Cranial nerves 2-12 are intact  No facial asymmetry  Sensory: Sensation is intact  Motor: No weakness        Coordination: Finger-Nose-Finger Test and Heel to [facility] Test normal       Comments: No drift in any extremity         ED Medications  Medications   amLODIPine (NORVASC) tablet 10 mg (10 mg Oral Given 4/27/23 0916)   apixaban (ELIQUIS) tablet 5 mg (5 mg Oral Given 4/27/23 0915)   atorvastatin (LIPITOR) tablet 40 mg (40 mg Oral Given 4/27/23 0914)   citalopram (CeleXA) tablet 20 mg (20 mg Oral Given 4/27/23 0916)   donepezil (ARICEPT) tablet 5 mg (5 mg Oral Given 4/27/23 0914)   hydrALAZINE (APRESOLINE) tablet 25 mg (25 mg Oral Given 4/27/23 0915)   losartan (COZAAR) tablet 50 mg (50 mg Oral Given 4/27/23 0914)   metoprolol succinate (TOPROL-XL) 24 hr tablet 100 mg (100 mg Oral Given 4/27/23 0914)   pantoprazole (PROTONIX) EC tablet 20 mg (20 mg Oral Given 4/27/23 0516)   tamsulosin (FLOMAX) capsule 0 4 mg (0 4 mg Oral Not Given 4/26/23 1829)   acetaminophen (TYLENOL) tablet 650 mg (650 mg Oral Given 4/26/23 1837)   docusate sodium (COLACE) capsule 100 mg (100 mg Oral Not Given 4/27/23 0915)   senna (SENOKOT) tablet 17 2 mg (has no administration in time range)   ondansetron (ZOFRAN) injection 4 mg (has no administration in time range)   calcium carbonate (TUMS) chewable tablet 1,000 mg (has no administration in time range)   insulin lispro (HumaLOG) 100 units/mL subcutaneous injection 1-6 Units (1 Units Subcutaneous Not Given 4/27/23 1111)   insulin lispro (HumaLOG) 100 units/mL subcutaneous injection 1-6 Units (1 Units Subcutaneous Not Given 4/26/23 2259)   sodium chloride 0 9 % infusion (0 mL/hr Intravenous Stopped 4/27/23 0000)   nicotine (NICODERM CQ) 14 mg/24hr TD 24 hr patch 14 mg (14 mg Transdermal Not Given 4/27/23 1100)   ticagrelor (BRILINTA) tablet 90 mg (has no administration in time range)   potassium chloride (K-DUR,KLOR-CON) CR tablet 40 mEq (40 mEq Oral Given 4/27/23 0915)       Diagnostic Studies  Results Reviewed     Procedure Component Value Units Date/Time    Basic metabolic panel [[id number]]  (Abnormal) Collected: 04/27/23 0517    Lab Status: Final result Specimen: Blood from Arm, Left Updated: 04/27/23 0655     Sodium 135 mmol/L      Potassium 3 3 mmol/L      Chloride 105 mmol/L      CO2 26 mmol/L      ANION GAP 4 mmol/L      BUN 29 mg/dL      Creatinine 0 99 mg/dL      Glucose 109 mg/dL      Calcium 9 1 mg/dL      eGFR 80 ml/min/1 73sq m     Narrative:      [employer] guidelines for Chronic Kidney Disease (CKD):   •  Stage 1 with normal or high GFR (GFR > 90 mL/min/1 73 square meters)  •  Stage 2 Mild CKD (GFR = 60-89 mL/min/1 73 square meters)  •  Stage 3A Moderate CKD (GFR = 45-59 mL/min/1 73 square meters)  •  Stage 3B Moderate CKD (GFR = 30-44 mL/min/1 73 square meters)  •  Stage 4 Severe CKD (GFR = 15-29 mL/min/1 73 square meters)  •  Stage 5 End Stage CKD (GFR <15 mL/min/1 73 square meters)  Note: GFR calculation is accurate only with a steady state creatinine    Magnesium [[id number]]  (Normal) Collected: 04/27/23 0517    Lab Status: Final result Specimen: Blood from Arm, Left Updated: 04/27/23 0655     Magnesium 2 4 mg/dL     Phosphorus [[id number]]  (Normal) Collected: 04/27/23 0517    Lab Status: Final result Specimen: Blood from Arm, Left Updated: 04/27/23 0655     Phosphorus 3 6 mg/dL     CBC (With Platelets) [[id number]]  (Abnormal) Collected: 04/27/23 0517    Lab Status: Final result Specimen: Blood from Arm, Left Updated: 04/27/23 0654     WBC 11 01 Thousand/uL      RBC 4 83 Million/uL      Hemoglobin 15 7 g/dL      Hematocrit 45 3 %      MCV 94 fL      MCH 32 5 pg      MCHC 34 7 g/dL      RDW 13 3 %      Platelets 974 Thousands/uL      MPV 10 7 fL     HS Troponin I 4hr [[id number]]  (Normal) Collected: 04/26/23 2004    Lab Status: Final result Specimen: Blood from Arm, Left Updated: 04/26/23 2047     hs TnI 4hr 15 ng/L      Delta 4hr hsTnI 2 ng/L     HS Troponin I 2hr [[id number]]  (Normal) Collected: 04/26/23 1812    Lab Status: Final result Specimen: Blood from Arm, Left Updated: 04/26/23 1906     hs TnI 2hr 14 ng/L      Delta 2hr hsTnI 1 ng/L     Magnesium [[id number]]  (Normal) Collected: 04/26/23 1540    Lab Status: Final result Specimen: Blood from Arm, Right Updated: 04/26/23 1851     Magnesium 2 4 mg/dL     Phosphorus [[id number]]  (Normal) Collected: 04/26/23 1540    Lab Status: Final result Specimen: Blood from Arm, Right Updated: 04/26/23 1851     Phosphorus 3 5 mg/dL     HS Troponin 0hr (reflex protocol) [[id number]]  (Normal) Collected: 04/26/23 1540    Lab Status: Final result Specimen: Blood from Arm, Right Updated: 04/26/23 1613     hs TnI 0hr 13 ng/L     APTT [[id number]]  (Normal) Collected: 04/26/23 1540    Lab Status: Final result Specimen: Blood from Arm, Right Updated: 04/26/23 1606     PTT 34 seconds     Protime-INR [[id number]]  (Abnormal) Collected: 04/26/23 1540    Lab Status: Final result Specimen: Blood from Arm, Right Updated: 04/26/23 1606     Protime 14 9 seconds      INR 6 33    Basic metabolic panel [[id number]]  (Abnormal) Collected: 04/26/23 1540    Lab Status: Final result Specimen: Blood from Arm, Right Updated: 04/26/23 1603     Sodium 130 mmol/L      Potassium 3 7 mmol/L      Chloride 100 mmol/L      CO2 25 mmol/L      ANION GAP 5 mmol/L      BUN 25 mg/dL      Creatinine 1 15 mg/dL      Glucose 123 mg/dL      Calcium 8 9 mg/dL      eGFR 66 ml/min/1 73sq m     Narrative:      [place] guidelines for Chronic Kidney Disease (CKD):   •  Stage 1 with normal or high GFR (GFR > 90 mL/min/1 73 square meters)  •  Stage 2 Mild CKD (GFR = 60-89 mL/min/1 73 square meters)  •  Stage 3A Moderate CKD (GFR = 45-59 mL/min/1 73 square meters)  •  Stage 3B Moderate CKD (GFR = 30-44 mL/min/1 73 square meters)  •  Stage 4 Severe CKD (GFR = 15-29 mL/min/1 73 square meters)  •  Stage 5 End Stage CKD (GFR <15 mL/min/1 73 square meters)  Note: GFR calculation is accurate only with a steady state creatinine    CBC and Platelet [[id number]]  (Abnormal) Collected: 04/26/23 1540    Lab Status: Final result Specimen: Blood from Arm, Right Updated: 04/26/23 1556     WBC 12 38 Thousand/uL      RBC 4 91 Million/uL      Hemoglobin 15 9 g/dL      Hematocrit 45 2 %      MCV 92 fL      MCH 32 4 pg      MCHC 35 2 g/dL      RDW 13 0 %      Platelets 471 Thousands/uL      MPV 9 8 fL     Fingerstick Glucose (POCT) [[id number]]  (Normal) Collected: 04/26/23 1522    Lab Status: Final result Updated: 04/26/23 1534     POC Glucose 125 mg/dl                  MRI brain wo contrast   Final Result by Pretty Delaney MD (04/27 9919)      Compared to the prior recent MRI examination dated 4/17/2023, interval increase in size of diffusion abnormality in the left parietal subcortical and periventricular white matter consistent with acute to subacute ischemia  New small focus of gyral diffusion abnormality in the right frontal parietal region (4:20)  Persistent additional small foci of diffusion abnormality in the left periatrial and parieto-occipital region  No acute hemorrhage or midline shift  Stable moderate chronic microangiopathic changes within the brain  Study was marked in [facility] for immediate notification  Workstation performed: [id number]         CTA stroke alert (head/neck)   Final Result by SUSAN Romero MD (04/26 1611)   Stable moderate (60 to 65%) stenosis in the bilateral internal carotid arteries  Stable severe stenosis distal right M1 segment  Stable severe stenosis proximal left M2  No new intracranial stenosis or large vessel occlusion  Findings were directly discussed with Sonya Jiménez at 3:42 p m  Workstation performed: [id number]         CT stroke alert brain   Final Result by SUSAN Romero MD (04/26 1556)      No acute intracranial abnormality  Chronic microangiopathy  Small evolving subacute infarct in the left parietal periventricular white matter  Other small recent infarcts better visualized on previous MRI  Findings were directly discussed with Sonya Jiménez at 3:42 p m  Workstation performed: [id number]         XR chest pa & lateral    (Results Pending)         Procedures  Procedures      ED Course  ED Course as of 04/27/23 1256   Wed Apr 26, 2023   1544 ECG 12 lead  This ECG was interpreted by me   The heart rate is 77, which is normal  The rhythm is regular  The axis is normal  The P waves are normal and the VA interval is normal  The QRS height is normal and width is normal  The ST segments are not elevated or depressed  The T waves are normal  The QT segment is normal  This ecg shows sinus rhythm with PVCs  [address] for admission                  Stroke Assessment     Row Name 04/26/23 1556             NIH Stroke Scale    Interval Baseline      Level of Consciousness (1a ) 0      LOC Questions (1b ) 1      LOC Commands (1c ) 0      Best Gaze (2 ) 0      Visual (3 ) 0      Facial Palsy (4 ) 0      Motor Arm, Left (5a ) 0      Motor Arm, Right (5b ) 0      Motor Leg, Left (6a ) 0      Motor Leg, Right (6b ) 0      Limb Ataxia (7 ) 0      Sensory (8 ) 0      Best Language (9 ) 0      Dysarthria (10 ) 0      Extinction and Inattention (11 ) (Formerly Neglect) 0      Total 1              Flowsheet Row Most Recent Value   Thrombolytic Decision Options    Thrombolytic Decision Patient not a candidate  Medical Decision Making  71-year-old male presents as prehospital stroke alert  Initial imaging was negative for stroke  Based on patient's exam, this is either a stroke versus encephalopathy or other cause of confusion  Neuro recommends admission to [facility]  No other acute abnormalities were found on lab work  Discussed case with [facility]  Patient was admitted to medicine for further work-up and evaluation  Confusion: acute illness or injury  Stroke like symptoms: acute illness or injury  Amount and/or Complexity of Data Reviewed  Independent Historian: EMS  External Data Reviewed:      Details: Reviewed past medical history and medications  Labs: ordered  Radiology: ordered  ECG/medicine tests: ordered and independent interpretation performed  Decision-making details documented in ED Course  Risk  Decision regarding hospitalization              Disposition  Final diagnoses:   Stroke like symptoms   Confusion Time reflects when diagnosis was documented in both MDM as applicable and the Disposition within this note     Time User Action Codes Description Comment    4/26/2023  3:11 PM Sugey Martinez Add [I63 9] Stroke [facility])     4/26/2023  4:27 PM Jovita Borden Modify [I63 9] Stroke like symptoms     4/26/2023  4:28 PM Jovita Borden Add [R41 0] Confusion       ED Disposition     ED Disposition   Admit    Condition   Stable    Date/Time   Wed Apr 26, 2023  4:27 PM    Comment   Case was discussed with Dr Jory Mims and the patient's admission status was agreed to be Admission Status: inpatient status to the service of Dr Jory Mims   Follow-up Information    None         Current Discharge Medication List      CONTINUE these medications which have NOT CHANGED    Details   amLODIPine (NORVASC) 10 mg tablet Take 10 mg by mouth daily      apixaban (Eliquis) 5 mg Take 1 tablet (5 mg total) by mouth 2 (two) times a day  Qty: 60 tablet, Refills: 0    Associated Diagnoses: Stroke ([facility] [address] ); Hypertensive urgency; Altered mental status, unspecified altered mental status type      aspirin (ECOTRIN LOW STRENGTH) 81 mg EC tablet Take 1 tablet (81 mg total) by mouth daily Do not start before April 19, 2023  Qty: 30 tablet, Refills: 0    Associated Diagnoses: Stroke ([address] ); Hypertensive urgency;  Altered mental status, unspecified altered mental status type      atorvastatin (LIPITOR) 40 mg tablet Take 40 mg by mouth daily with breakfast      citalopram (CeleXA) 20 mg tablet Take 20 mg by mouth daily      donepezil (ARICEPT) 5 mg tablet TAKE 1 TABLET TWICE A DAY  Qty: 180 tablet, Refills: 3    Associated Diagnoses: Memory loss      hydrALAZINE (APRESOLINE) 25 mg tablet Take 25 mg by mouth 3 (three) times a day      hydrochlorothiazide (HYDRODIURIL) 25 mg tablet Take 25 mg by mouth daily      lidocaine (Lidoderm) 5 % Apply 1 patch topically over 12 hours daily Remove & Discard patch within 12 hours or as directed by MD  Qty: 30 patch, Refills: 2    Associated Diagnoses: Acute midline low back pain without sciatica      losartan (COZAAR) 50 mg tablet Take 50 mg by mouth daily      metFORMIN (GLUCOPHAGE) 500 mg tablet Take 1 tablet (500 mg total) by mouth daily with breakfast  Qty: 30 tablet, Refills: 0    Associated Diagnoses: History of diabetes mellitus, type II      metoprolol succinate (TOPROL-XL) 100 mg 24 hr tablet Take 100 mg by mouth daily      omeprazole (PriLOSEC OTC) 20 MG tablet Take 20 mg by mouth daily      sildenafil (REVATIO) 20 mg tablet TAKE 2 3 TABLETS BY MOUTH AS NEEDED FOR SEXUAL ACTIVITY  Refills: 2      tamsulosin (FLOMAX) 0 4 mg Take 0 4 mg by mouth daily with breakfast           No discharge procedures on file  PDMP Review       Value Time User    PDMP Reviewed  Yes 1/13/2023 10:47 AM Gilford Nunnery, MD           ED Provider  Attending physically available and evaluated Shan Hogan  AMY managed the patient along with the ED Attending      Electronically Signed by         Raymond Gonzalez DO  04/27/23 3356

## 2023-04-26 NOTE — TELEPHONE ENCOUNTER
Leisa, [employer]  Provided Dr Severino Clamp response  She inquired if the patient had a mini stroke or a stroke  Reviewed MRI brain impression with Jesús Moseley  She expressed an understanding  They would like the patient to go to the [facility] location for PT  Advised to notify the  for PT when they call  She expressed frustration how the patient was discharged from the hospital so soon and how she felt unprepared to take care of him at home  Advised if she feels like she is unable to safety take care of him at home, then to take him to the ER for rehab/nursing home placement  Provided emotional support  No further questions or concerns  PT order placed  Notified stroke

## 2023-04-26 NOTE — ASSESSMENT & PLAN NOTE
· Reports chronic lower back pain for 4 weeks which has not improved despite multiple trails of steroids, nsaids and muscle relaxants    · XR of lumbar spine ordered by PCP - follow up results, radiology notified to expedite results  · PRN tylenol for now due to AMS

## 2023-04-26 NOTE — ASSESSMENT & PLAN NOTE
· Developed AMS while at home, wife witnessed event, pt was confused with abnormal speech, similar to previous CVA symptoms  · Differential include seizure, arrhythmia, hypertensive encephalopathy, hyponatremia  · Symptoms persisted until after arrival to the ED and are improving now, but still not baseline per wife  · Will check EEG and MRI brain  · Hold HCTZ and provide IVF to correct hyponatremia  · Monitor on telemetry for arrythmia    · Neurology consultation requested

## 2023-04-26 NOTE — H&P
[address] [facility]  H&P  Name: Carlos Velasquez 58 y o  male I MRN: [id number]  [facility] I Date of Admission: 4/26/2023   Date of Service: 4/26/2023 I Hospital Day: 0      Assessment/Plan   * Stroke-like symptoms  Assessment & Plan  · Developed AMS while at home, wife witnessed event, pt was confused with abnormal speech, similar to previous CVA symptoms  · Differential include seizure, arrhythmia, hypertensive encephalopathy, hyponatremia  · Symptoms persisted until after arrival to the ED and are improving now, but still not baseline per wife  · Will check EEG and MRI brain  · Hold HCTZ and provide IVF to correct hyponatremia  · Monitor on telemetry for arrythmia  · Neurology consultation requested    Hyponatremia  Assessment & Plan  · Likely due to poor oral intake and HCTZ use  · Hold HCTZ  · IV normal saline  · BMP in AM    Chronic low back pain  Assessment & Plan  · Reports chronic lower back pain for 4 weeks which has not improved despite multiple trails of steroids, nsaids and muscle relaxants  · XR of lumbar spine ordered by PCP - follow up results, radiology notified to expedite results  · PRN tylenol for now due to AMS    Chronic ischemic right MCA stroke  Assessment & Plan  · Continue aspirin, Eliquis and statin  · PT/OT/ST  · Supportive care    Type 2 diabetes mellitus, without long-term current use of insulin ([facility])  Assessment & Plan  Lab Results   Component Value Date    HGBA1C 6 1 (H) 04/17/2023       Recent Labs     04/26/23  1522   POCGLU 125       Blood Sugar Average: Last 72 hrs:  (P) 125   Hold metformin while inpatient  Diabetic diet  Fingerstick QID with sliding scale coverage  Start basal/bolus insulin when appetite improves    Adjustment reaction with anxiety  Assessment & Plan  · Continue celexa     Primary hypertension  Assessment & Plan  · Per wife and his BP records, his BP has been uncontrolled    · Compliance has not been ideal per his since until she started managing his medications  · Will restart metoprolol, losartan, hydralazine and amlodipine - monitor Bps and adjust PRN  · Holding HCTZ due to hyponatremia  · Low Na diet       VTE Pharmacologic Prophylaxis:   Moderate Risk (Score 3-4) - Pharmacological DVT Prophylaxis Ordered: apixaban (Eliquis)  Code Status: Level 1 - Full Code   Discussion with family: Updated  (wife and sister) at bedside  Anticipated Length of Stay: Patient will be admitted on an inpatient basis with an anticipated length of stay of greater than 2 midnights secondary to altered mental status  Total Time Spent on Date of Encounter in care of patient: 55 minutes This time was spent on one or more of the following: performing physical exam; counseling and coordination of care; obtaining or reviewing history; documenting in the medical record; reviewing/ordering tests, medications or procedures; communicating with other healthcare professionals and discussing with patient's family/caregivers  Chief Complaint: change in mental status    History of Present Illness:  Christ Livingston is a 58 y o  male with a PMH of CVA who presents with change in mental status  Per his wife, he was at home today sitting when she noticed a change in his speech which progressed into incomprehensible speech  His symptoms persisted until arrival to the ED and are starting to improve now  Since his prior stroke he has been frustrated with his neurologic deficits and lower back pain  Review of Systems:  Review of Systems   All other systems reviewed and are negative        Past Medical and Surgical History:   Past Medical History:   Diagnosis Date   • Depression    • Diabetes mellitus ([facility] [address] )    • Dyslipidemia 03/26/2019   • Hyperlipidemia    • Hypertension    • Stroke [facility])        Past Surgical History:   Procedure Laterality Date   • BACK SURGERY     • IR STROKE ALERT  3/19/2019   • SHOULDER SURGERY Meds/Allergies:  Prior to Admission medications    Medication Sig Start Date End Date Taking?  Authorizing Provider   losartan (COZAAR) 50 mg tablet Take 50 mg by mouth daily   Yes Historical Provider, MD   amLODIPine (NORVASC) 10 mg tablet Take 10 mg by mouth daily    Historical Provider, MD   apixaban (Eliquis) 5 mg Take 1 tablet (5 mg total) by mouth 2 (two) times a day 4/18/23   Steve Luque PA-C   aspirin (ECOTRIN LOW STRENGTH) 81 mg EC tablet Take 1 tablet (81 mg total) by mouth daily Do not start before April 19, 2023 4/19/23   Aminah WHYTE PA-C   atorvastatin (LIPITOR) 40 mg tablet Take 40 mg by mouth daily    Historical Provider, MD   citalopram (CeleXA) 20 mg tablet Take 20 mg by mouth daily    Historical Provider, MD   donepezil (ARICEPT) 5 mg tablet TAKE 1 TABLET TWICE A DAY 2/7/23   Gage Cormier MD   hydrALAZINE (APRESOLINE) 25 mg tablet Take 25 mg by mouth 3 (three) times a day    Historical Provider, MD   hydrochlorothiazide (HYDRODIURIL) 25 mg tablet Take 25 mg by mouth daily    Historical Provider, MD   lidocaine (Lidoderm) 5 % Apply 1 patch topically over 12 hours daily Remove & Discard patch within 12 hours or as directed by MD 4/26/23   Thad Edwards DO   metFORMIN (GLUCOPHAGE) 500 mg tablet Take 1 tablet (500 mg total) by mouth daily with breakfast 4/6/19   Valeriano Hodges MD   metoprolol succinate (TOPROL-XL) 100 mg 24 hr tablet Take 100 mg by mouth daily    Historical Provider, MD   omeprazole (PriLOSEC OTC) 20 MG tablet Take 20 mg by mouth daily    Historical Provider, MD   sildenafil (REVATIO) 20 mg tablet TAKE 2 3 TABLETS BY MOUTH AS NEEDED FOR SEXUAL ACTIVITY 7/12/19   Historical Provider, MD   tamsulosin (FLOMAX) 0 4 mg Take 0 4 mg by mouth daily with dinner    Historical Provider, MD   clopidogrel (PLAVIX) 75 mg tablet Take 1 tablet (75 mg total) by mouth daily  Patient not taking: Reported on 4/26/2023 4/6/19 4/26/23  Valeriano Hodges MD   Diclofenac Sodium (VOLTAREN) 1 % Apply 4 g topically 4 (four) times a day  Patient not taking: Reported on 4/26/2023 4/18/23 4/26/23  Alf Notch V, PA-MARIELA   fluticasone Veronica Diane) 50 mcg/act nasal spray 1 spray into each nostril daily  Patient not taking: Reported on 4/26/2023 4/6/19 4/26/23  Siri Perez MD   Melatonin 10 MG TABS Take 5 mg by mouth as needed Takes 2 tabs as needed  Patient not taking: Reported on 4/26/2023 4/26/23  Historical Provider, MD   TOPROL  MG 24 hr tablet Take 100 mg by mouth daily   Patient not taking: Reported on 1/20/2023 7/31/19 4/26/23  Historical Provider, MD     I have reviewed home medications with patient family member  Allergies: Allergies   Allergen Reactions   • Lisinopril Cough       Social History:  Marital Status: /Civil Union   Occupation: None listed  Patient Pre-hospital Living Situation: Home  Patient Pre-hospital Level of Mobility: walks  Patient Pre-hospital Diet Restrictions: None  Substance Use History:   Social History     Substance and Sexual Activity   Alcohol Use Not Currently     Social History     Tobacco Use   Smoking Status Former   Smokeless Tobacco Never     Social History     Substance and Sexual Activity   Drug Use Never       Family History:  Family History   Problem Relation Age of Onset   • Heart attack Mother    • Diabetes Mother    • Prostate cancer Father        Physical Exam:     Vitals:   Blood Pressure: 137/62 (04/26/23 1715)  Pulse: 70 (04/26/23 1715)  Temperature: 98 7 °F (37 1 °C) (04/26/23 1525)  Temp Source: Tympanic (04/26/23 1525)  Respirations: 16 (04/26/23 1715)  Weight - Scale: 103 kg (227 lb 1 2 oz) (04/26/23 1544)  SpO2: 97 % (04/26/23 1715)    Physical Exam  Constitutional:       General: He is not in acute distress  Appearance: He is obese  He is not toxic-appearing  HENT:      Head: Normocephalic and atraumatic        Nose: Nose normal       Mouth/Throat:      Mouth: Mucous membranes are moist       Pharynx: Oropharynx is clear  Eyes:      Extraocular Movements: Extraocular movements intact  Conjunctiva/sclera: Conjunctivae normal       Pupils: Pupils are equal, round, and reactive to light  Cardiovascular:      Rate and Rhythm: Normal rate and regular rhythm  Pulmonary:      Effort: Pulmonary effort is normal       Breath sounds: No wheezing or rales  Abdominal:      General: There is no distension  Musculoskeletal:      Right lower leg: No edema  Left lower leg: No edema  Skin:     General: Skin is warm and dry  Neurological:      Mental Status: He is alert and oriented to person, place, and time  Comments: Mildly dysarthric speech  Left hemineglect  Strength is equal bilateral  Fine LUE tremor   Psychiatric:         Mood and Affect: Mood normal          Behavior: Behavior normal           Additional Data:     Lab Results:  Results from last 7 days   Lab Units 04/26/23  1540   WBC Thousand/uL 12 38*   HEMOGLOBIN g/dL 15 9   HEMATOCRIT % 45 2   PLATELETS Thousands/uL 316     Results from last 7 days   Lab Units 04/26/23  1540   SODIUM mmol/L 130*   POTASSIUM mmol/L 3 7   CHLORIDE mmol/L 100   CO2 mmol/L 25   BUN mg/dL 25   CREATININE mg/dL 1 15   ANION GAP mmol/L 5   CALCIUM mg/dL 8 9   GLUCOSE RANDOM mg/dL 123     Results from last 7 days   Lab Units 04/26/23  1540   INR  1 15     Results from last 7 days   Lab Units 04/26/23  1522   POC GLUCOSE mg/dl 125               Lines/Drains:  Invasive Devices     Peripheral Intravenous Line  Duration           Peripheral IV 04/26/23 Distal;Right;Upper;Ventral (anterior) Arm <1 day                    Imaging: Reviewed radiology reports from this admission including: CT head  CTA stroke alert (head/neck)   Final Result by SUSAN Vazquez MD (04/26 1611)   Stable moderate (60 to 65%) stenosis in the bilateral internal carotid arteries  Stable severe stenosis distal right M1 segment  Stable severe stenosis proximal left M2      No new intracranial stenosis or large vessel occlusion  Findings were directly discussed with Melecio Spangler at 3:42 p m  Workstation performed: [id number]         CT stroke alert brain   Final Result by SUSAN Patel MD (04/26 1556)      No acute intracranial abnormality  Chronic microangiopathy  Small evolving subacute infarct in the left parietal periventricular white matter  Other small recent infarcts better visualized on previous MRI  Findings were directly discussed with Melecio Spangler at 3:42 p m  Workstation performed: [id number]             EKG and Other Studies Reviewed on Admission:   · EKG: NSR  HR 70     ** Please Note: This note has been constructed using a voice recognition system   **

## 2023-04-26 NOTE — CASE MANAGEMENT
Case Management Assessment & Discharge Planning Note    Patient name Darrion Blunt  Location ED 23/ED 23 MRN [id number]  : 1960 Date 2023       Current Admission Date: 2023  Current Admission Diagnosis:Stroke-like symptoms  Patient Active Problem List    Diagnosis Date Noted   • Chronic anticoagulation 2023   • Stroke-like symptoms 2023   • Middle cerebral artery stenosis, right 2023   • Left posterior MCA stroke - etiology unclear at this time 2023   • Chronic low back pain 2023   • Hypertensive encephalopathy, transient 2023   • Snoring 08/10/2020   • Memory difficulties 08/10/2020   • Chronic ischemic right MCA stroke 2019   • Status post placement of implantable loop recorder 2019   • Type 2 diabetes mellitus, without long-term current use of insulin ([facility] ) 2019   • Adjustment reaction with anxiety 2019   • Insomnia 2019   • Fall 2019   • 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 2019   • Constipation 2019   • Urinary retention 2019   • Hypertriglyceridemia 2019   • Nicotine dependence 2019   • Stenosis of right carotid artery 2019   • Presbyopia 2019   • History of stroke 2019   • Headache 2019   • Primary hypertension 2019   • GERD (gastroesophageal reflux disease) 2019      LOS (days): 0  Geometric Mean LOS (GMLOS) (days):   Days to GMLOS:     OBJECTIVE:  PATIENT READMITTED TO HOSPITAL            Current admission status: Inpatient  Referral Reason: Stroke    Preferred Pharmacy:   [facility] [address] [place] [address] [address]  [address] [place] [address]  Phone: [phone] Fax: [address] [place] [address]  Phone: [phone] Fax: [phone]    Primary Care Provider: ERIC Garcia    Primary Insurance: [employer] REP  Secondary Insurance: BLUE CROSS    ASSESSMENT:  Misael [address] Representative - Spouse   Primary Phone: [phone] (Mobile)  Home Phone: [phone]                Readmission Root Cause  30 Day Readmission: Yes  Who directed you to return to the hospital?: Family  Did you understand whom to contact if you had questions or problems?: Yes  Did you get your prescriptions before you left the hospital?: Yes  Were you able to get your prescriptions filled when you left the hospital?: Yes  Did you take your medications as prescribed?: Yes  Were you able to get to your follow-up appointments?: Yes  Patient was readmitted due to: stroke alert    Patient Information  Admitted from[de-identified] Home  Mental Status: Alert  During Assessment patient was accompanied by: Not accompanied during assessment  Assessment information provided by[de-identified] Patient  Primary Caregiver: Self  Support Systems: Spouse/significant other  [place] of Residence: [address] [place] [address] do you live in?: [address]  Type of Current Residence: [address] [place] home  Upon entering residence, is there a bedroom on the main floor (no further steps)?: Yes  Upon entering residence, is there a bathroom on the main floor (no further steps)?: Yes  In the last 12 months, was there a time when you were not able to pay the mortgage or rent on time?: No  In the last 12 months, how many places have you lived?: 1  In the last 12 months, was there a time when you did not have a steady place to sleep or slept in a shelter (including now)?: No  Homeless/housing insecurity resource given?: No  Living Arrangements: Lives w/ Spouse/significant other    Activities of Daily Living Prior to Admission  Functional Status: Independent  Completes ADLs independently?: Yes  Ambulates independently?: Yes  Does patient use assisted devices?: Yes  Assisted Devices (DME) used: Electric wheelchair, Shower Chair  Does patient currently own DME?: Yes  What DME does the patient currently own?: Electric Wheelchair, Shower Chair  Does patient have a history of Outpatient Therapy (PT/OT)?: Yes  Does the patient have a history of Short-Term Rehab?: No  Does patient have a history of HHC?: No  Does patient currently have Jennifer Moreojn?: No         Patient Information Continued  Income Source: Pension/skilled nursing  Does patient have prescription coverage?: Yes  Within the past 12 months, you worried that your food would run out before you got the money to buy more : Never true  Within the past 12 months, the food you bought just didn't last and you didn't have money to get more : Never true  Food insecurity resource given?: No  Does patient receive dialysis treatments?: No  Does patient have a history of substance abuse?: No  Does patient have a history of Mental Health Diagnosis?: No    Means of Transportation  Means of Transport to Appts[de-identified] Drives Self  In the past 12 months, has lack of transportation kept you from medical appointments or from getting medications?: No  In the past 12 months, has lack of transportation kept you from meetings, work, or from getting things needed for daily living?: No  Was application for public transport provided?: No    DISCHARGE DETAILS:    Discharge planning discussed with[de-identified] Patient  Freedom of Choice: Yes     CM contacted family/caregiver?:  (Neurology called wife in the ED)        Contacts  Patient Contacts: Kyle Hough (wife)  Relationship to Patient[de-identified] Family

## 2023-04-26 NOTE — ASSESSMENT & PLAN NOTE
Lab Results   Component Value Date    HGBA1C 6 1 (H) 04/17/2023       Recent Labs     04/26/23  1522   POCGLU 125       Blood Sugar Average: Last 72 hrs:  (P) 125   Hold metformin while inpatient  Diabetic diet  Fingerstick QID with sliding scale coverage  Start basal/bolus insulin when appetite improves

## 2023-04-26 NOTE — ASSESSMENT & PLAN NOTE
· Per wife and his BP records, his BP has been uncontrolled  · Compliance has not been ideal per his since until she started managing his medications    · Will restart metoprolol, losartan, hydralazine and amlodipine - monitor Bps and adjust PRN  · Holding HCTZ due to hyponatremia  · Low Na diet

## 2023-04-26 NOTE — PROGRESS NOTES
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 ([address] )    3  Type 2 diabetes mellitus without complication, without long-term current use of insulin ([address] )    4  Chronic anticoagulation    5  Cigarette nicotine dependence without complication        Plan    Pleasant 41-year-old gentleman presents in accompaniment with his wife  1 month history of midline low back pain occasional radiation to his right leg  Family thinks his recent stroke which was just discharged from the hospital was secondary to increased NSAIDs  Recommendations are as follows:    Like him to undergo physical therapy lumbar stabilization and strengthening, particularly electrical stimulation and possible evaluation for home TENS unit as this has helped him with recent chiropractic treatment, prescription was provided  He had benefit with ER prescription for Lidoderm patch, I did refill the medication  We will obtain radials lumbar spine to rule out any acute pathology that may be contribute to his symptoms  He understands that most likely would not be able to pursue epidural steroid injections in the near future secondary to his recent stroke and anticoagulation as he has had benefit in the past but will reevaluate other possibilities depending on how he does in the weeks of follow-up  We will follow-up in approximately 2 weeks time certainly sooner if needed  My impressions and treatment recommendations were discussed in detail with the patient who verbalized understanding and had no further questions  Discharge instructions were provided  I personally saw and examined the patient and I agree with the above discussed plan of care  This note is created using dictation transcription  It may contain typographical errors, grammatical errors, improperly dictated words, background noise and other errors      Orders Placed This Encounter   Procedures   • X-ray lumbar spine complete 4+ views     Standing Status:   Future     Standing Expiration Date:   4/26/2027     Scheduling Instructions:      Bring along any outside films relating to this procedure  • Ambulatory referral to Physical Therapy     Standing Status:   Future     Standing Expiration Date:   4/26/2024     Referral Priority:   Routine     Referral Type:   Physical Therapy     Referral Reason:   Specialty Services Required     Requested Specialty:   Physical Therapy     Number of Visits Requested:   1     Expiration Date:   4/26/2024     New Medications Ordered This Visit   Medications   • metoprolol succinate (TOPROL-XL) 100 mg 24 hr tablet     Sig: Take 100 mg by mouth daily   • hydrochlorothiazide (HYDRODIURIL) 25 mg tablet     Sig: Take 25 mg by mouth daily   • hydrALAZINE (APRESOLINE) 25 mg tablet     Sig: Take 25 mg by mouth 3 (three) times a day   • lidocaine (Lidoderm) 5 %     Sig: Apply 1 patch topically over 12 hours daily Remove & Discard patch within 12 hours or as directed by MD     Dispense:  30 patch     Refill:  2     Referred By: ERIC Odonnell  History of Present Illness    Rogelio Whittaker is a 58 y o  male who was recently hospitalized for a stroke  His wife feels that stroke may be related to increased blood pressure possibly related to increased nonsteroidal anti-inflammatory use  This is all related to his low back pain midline radiating to his right lower extremity  It is severe rates as 9 out of 10 the visual analog scale completely interfering with daily activities  It is constant but worse in the morning scribes shooting he has subjective weakness of the lower limbs  Reports that lying down standing bending all aggravate his symptoms with nothing seems to reduce his pain  He denies any loss of bowel or bladder function  Is undergone recent chiropractic treatment and electrical stimulation has provided the most significant relief    Is a for half a pack a day for 40 years smoker  I have personally reviewed and/or updated the patient's past medical history, past surgical history, family history, social history, current medications, allergies, and vital signs today  Review of Systems   Constitutional: Negative for fever and unexpected weight change  HENT: Negative for trouble swallowing  Eyes: Negative for visual disturbance  Respiratory: Negative for shortness of breath and wheezing  Cardiovascular: Negative for chest pain and palpitations  Gastrointestinal: Negative for constipation, diarrhea, nausea and vomiting  Endocrine: Negative for cold intolerance, heat intolerance and polydipsia  Genitourinary: Negative for difficulty urinating and frequency  Musculoskeletal: Negative for arthralgias, gait problem, joint swelling and myalgias  Skin: Negative for rash  Neurological: Positive for numbness  Negative for dizziness, seizures, syncope, weakness and headaches  Hematological: Does not bruise/bleed easily  Psychiatric/Behavioral: Positive for dysphoric mood  All other systems reviewed and are negative        Patient Active Problem List   Diagnosis   • History of stroke   • Headache   • Primary hypertension   • GERD (gastroesophageal reflux disease)   • Hypertriglyceridemia   • Nicotine dependence   • Stenosis of right carotid artery   • Presbyopia   • Constipation   • Urinary retention   • Fall   • 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 [address] )   • Adjustment reaction with anxiety   • Insomnia   • Type 2 diabetes mellitus, without long-term current use of insulin ([facility])   • Status post placement of implantable loop recorder   • Chronic ischemic right MCA stroke   • Snoring   • Memory difficulties   • Hypertensive encephalopathy, transient   • Chronic low back pain   • Middle cerebral artery stenosis, right   • Left posterior MCA stroke - etiology unclear at this time   • Chronic anticoagulation       Past Medical History:   Diagnosis Date   • Depression    • Diabetes mellitus ([facility] [address] )    • Dyslipidemia 03/26/2019   • Hyperlipidemia    • Hypertension    • Stroke [facility])        Past Surgical History:   Procedure Laterality Date   • BACK SURGERY     • IR STROKE ALERT  3/19/2019   • SHOULDER SURGERY         Family History   Problem Relation Age of Onset   • Heart attack Mother    • Diabetes Mother    • Prostate cancer Father        Social History     Occupational History   • Not on file   Tobacco Use   • Smoking status: Former   • Smokeless tobacco: Never   Vaping Use   • Vaping Use: Some days   Substance and Sexual Activity   • Alcohol use: Not Currently   • Drug use: Never   • Sexual activity: Not on file       Current Outpatient Medications on File Prior to Visit   Medication Sig   • amLODIPine (NORVASC) 2 5 mg tablet Take 2 5 mg by mouth daily   • apixaban (Eliquis) 5 mg Take 1 tablet (5 mg total) by mouth 2 (two) times a day   • aspirin (ECOTRIN LOW STRENGTH) 81 mg EC tablet Take 1 tablet (81 mg total) by mouth daily Do not start before April 19, 2023     • atorvastatin (LIPITOR) 40 mg tablet Take 80 mg by mouth daily   • citalopram (CeleXA) 10 mg tablet Take 20 mg by mouth daily   • donepezil (ARICEPT) 5 mg tablet TAKE 1 TABLET TWICE A DAY   • hydrALAZINE (APRESOLINE) 25 mg tablet Take 25 mg by mouth 3 (three) times a day   • hydrochlorothiazide (HYDRODIURIL) 25 mg tablet Take 25 mg by mouth daily   • metFORMIN (GLUCOPHAGE) 500 mg tablet Take 1 tablet (500 mg total) by mouth daily with breakfast   • metoprolol succinate (TOPROL-XL) 100 mg 24 hr tablet Take 100 mg by mouth daily   • omeprazole (PriLOSEC OTC) 20 MG tablet Take 20 mg by mouth daily   • tamsulosin (FLOMAX) 0 4 mg Take 0 4 mg by mouth daily with dinner   • sildenafil (REVATIO) 20 mg tablet TAKE 2 3 TABLETS BY MOUTH AS NEEDED FOR SEXUAL ACTIVITY   • [DISCONTINUED] clopidogrel (PLAVIX) 75 mg tablet Take 1 tablet (75 mg total) by mouth daily (Patient not taking: Reported on 4/26/2023)   • "[DISCONTINUED] Diclofenac Sodium (VOLTAREN) 1 % Apply 4 g topically 4 (four) times a day (Patient not taking: Reported on 4/26/2023)   • [DISCONTINUED] fluticasone (FLONASE) 50 mcg/act nasal spray 1 spray into each nostril daily (Patient not taking: Reported on 4/26/2023)   • [DISCONTINUED] Melatonin 10 MG TABS Take 5 mg by mouth as needed Takes 2 tabs as needed (Patient not taking: Reported on 4/26/2023)   • [DISCONTINUED] TOPROL  MG 24 hr tablet Take 100 mg by mouth daily  (Patient not taking: Reported on 1/20/2023)     No current facility-administered medications on file prior to visit  Allergies   Allergen Reactions   • Lisinopril Cough       Physical Exam    /96 (BP Location: Left arm, Patient Position: Sitting, Cuff Size: Standard)   Pulse 88   Temp 98 °F (36 7 °C)   Ht 5' 8\" (1 727 m)   Wt 103 kg (227 lb)   BMI 34 52 kg/m²     Constitutional: normal, well developed, well nourished, alert, in no distress and non-toxic and no overt pain behavior  and obese  Eyes: anicteric  HEENT: grossly intact  Neck: supple, symmetric, trachea midline and no masses   Pulmonary:even and unlabored  Cardiovascular:No edema or pitting edema present  Skin:Normal without rashes or lesions and well hydrated  Psychiatric:Mood and affect appropriate  Neurologic:Cranial Nerves II-XII grossly intact  Musculoskeletal:normal, difficulty going from sitting to standing sitting position; no obvious skin lesions or erythema lumbar sacral spine; mild tenderness in lumbar paravertebrals, as of tenderness on the midline low lumbar spinous process: No sacroiliac or greater trochanteric tenderness bilateral; deep tendon reflexes are diminished but symmetrical bilateral patellar and achilles; no focal motor deficit appreciated lower limbs; negative bilateral straight leg raising  Imaging  MRI LUMBAR SPINE WITHOUT CONTRAST @  11-1-19     INDICATION: [id number]: Spinal stenosis, lumbar region with neurogenic claudication     " Laminectomy 35 years earlier     COMPARISON:  None      TECHNIQUE:  Sagittal T1, sagittal T2, sagittal inversion recovery, axial T1 and axial T2, coronal T2     IMAGE QUALITY:  Diagnostic     FINDINGS:     VERTEBRAL BODIES:  Left convex L1-L2 apex scoliosis, leftward translation of L3 on L4, slight leftward translation of L4 on L5  Minor retrolisthesis of L1, degenerative anterolisthesis of L3 on L4  Normal marrow signal is identified within the   visualized bony structures  No discrete marrow lesion      SACRUM:  Normal signal within the sacrum  No evidence of insufficiency or stress fracture      DISTAL CORD AND CONUS:  Normal size and signal within the distal cord and conus  Conus terminates at the L1-L2 level      PARASPINAL SOFT TISSUES:  Paraspinal soft tissues are unremarkable      LOWER THORACIC DISC SPACES:  Normal disc height and signal   No disc herniation, canal stenosis or foraminal narrowing  Minor disc bulge with the anterior marginal osteophytes T11-T12 and T12-L1      LUMBAR DISC SPACES:     L1-L2:  Circumferential bulge with thin left paramedian the noncompressive protrusion  Marginal osteophytes and moderate facet arthrosis  No compressive disease      L2-L3:  Mild bilateral facet arthrosis, minor circumferential bulge, small marginal osteophytes      L3-L4:  Decreased disc height, minor degenerative anterolisthesis with the moderately advanced bilateral facet arthrosis  Minor central noncompressive protrusion      L4-L5:  Decreased disc height, circumferential bulge  Disc extends into both foramen without definite L4 compression  Moderate bilateral facet arthrosis, no critical stenosis      L5-S1:  Remote left laminectomy  No evidence for disc recurrence  Circumferential bulge with marginal osteophytes  Disc extends into both foramen with moderate left foraminal stenosis    Moderately advanced bilateral facet arthrosis      IMPRESSION:     Multilevel spondylosis and osteoarthritis with left convex L1-L2 apex scoliosis  Footprint of left L5-S1 laminectomy without disc recurrence  No significant stenosis at any level  No indication of arachnoiditis      I have personally reviewed pertinent films in PACS and my interpretation is Multilevel spondylosis with disc protrusion

## 2023-04-26 NOTE — CONSULTS
Consultation - Stroke   Adriano Braun 58 y o  male MRN: [id number]  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, known severe R M1 stenosis, prior tobacco use, recent L MCA stroke (had aphasia) 04/16/2023 came in as a Stroke alert on 4/26/2023  3:30 PM with initial NIHSS of 2 and LKW 2:30pm, initial Blood Pressure: 154/71  Initial presenting deficits were L facial droop, R sided weakness, and AMS   As a result of past stroke within the past month and on eliquis pt was determined to not be a candidate for thrombolysis (TNK)  • Exam on 04/26/23: confused/altered unable to give correct month and correct age   • Current Blood Pressure: 154/71, BP over 24 hours: BP  Min: 154/71  Max: 168/96   • Vascular risk factors: past L MCA and R MCA stroke, DM, HTN, HLD  • Home meds: eliquis 5mg BID, ASA 81mg     Workup:  Lab Results   Component Value Date    HGBA1C 6 1 (H) 04/17/2023    CHOLESTEROL 140 04/17/2023    LDLCALC 72 04/17/2023    TRIG 176 (H) 04/17/2023    INR 0 89 04/16/2023      • CTH: No acute intracranial abnormality  Chronic microangiopathy  Small evolving subacute infarct in the left parietal periventricular white matter  Other small recent infarcts better visualized on previous MRI    • CTA: negative for LVOs, aneurysms, dissection or AVMs  • MRI: pending  • Echocardiogram: pending   • Telemetry:     Pertinent scores:  - NIHSS: 2  Stroke Modified [place] Score: 0 (No baseline symptoms/disability)    Impression: Higher concern for recrudescence of previous stroke symptoms w/ possible TME vs TIA    Plan:  - Stroke pathway  - Discussed plan with neurology attending, Dr Hadley Faustin  - BP: Permissive hypertension for first 24 hours up to 220 SBP  - Obtain lipids and A1c  - atorvastatin 40mg qhs  - Maintain glucose <180, SSI for coverage if indicated  - Medical management as per primary team appreciated  - Antiplatelet agents: continue home ASA 81mg   - Continue home AC eliquis 5mg BID  - Would recommend w/u for possible TME given noted confusion/AMS that is persisting and likely causing noted recrudescence of prior stroke symptoms  - TTE not needed  - MRI brain pending  - DVT ppx and SCDs  - Monitor on telemetry  - PT/OT/Speech/PM&R input appreciated  - Stroke education        Thrombolytic Decision: Patient not a candidate  Recent stroke 1 week ago and on Eliquis      Recommendations for outpatient neurological follow up have yet to be determined  History of Present Illness     Reason for Consult / Principal Problem: Strokelike symptoms  Hx and PE limited by: Confusion  Patient last known well: 2:30 PM 04/26  Stroke alert called: 3:20pm 04/26  Neurology time of arrival: 3:22pm 04/26  HPI: Waqar Clayton is a 58 y o   male w/ PMH of  HTN, HLD, DM2, prior R MCA CVA s/p thrombectomy in March 2019, known severe R M1 stenosis, prior tobacco use, recent L MCA stroke (had aphasia) 04/16/2023 came in as a Stroke alert  Patient had gone to his pain clinic in the morning and then had some residual right-sided symptoms from his previous stroke  Of note, he was able to go home and then wife noted that when she went to check in on him around 2:30 p m , patient had worsening symptoms particularly in the right side  And she noted a lipase drip as well as altered mental status where he was speaking nonsensical and had noted aphasia  She ended up calling EMS and patient was to originally expect to go to [place] [id number] but was diverted to [facility]  At this time, when patient was evaluated most of his symptoms currently resolved and patient only mainly had the altered speech w/   facial droop and weakness resolved  Patient had taken his home Eliquis and aspirin that morning and has been very diligent with that per wife   Wife norted had episodes of confusion with past stroke about 1 week ago and notes noted it was attributed possibly due to hypertensive encephalopathy    Patient being admitted for possible recrudescence of previous strokelike symptoms in the setting of recent strokes  And requiring toxic metabolic encephalopathic work-up    Inpatient consult to Neurology  Consult performed by: Pool Barth DO  Consult ordered by: Fortino Aawn MD          Review of Systems    Historical Information   Past Medical History:   Diagnosis Date   • Depression    • Diabetes mellitus ([facility] [address] )    • Dyslipidemia 03/26/2019   • Hyperlipidemia    • Hypertension    • Stroke [facility])      Past Surgical History:   Procedure Laterality Date   • BACK SURGERY     • IR STROKE ALERT  3/19/2019   • SHOULDER SURGERY       Social History   Social History     Substance and Sexual Activity   Alcohol Use Not Currently     Social History     Substance and Sexual Activity   Drug Use Never     E-Cigarette/Vaping   • E-Cigarette Use Current Some Day User      E-Cigarette/Vaping Substances   • Nicotine No    • THC No    • CBD No    • Flavoring No    • Other No    • Unknown No      Social History     Tobacco Use   Smoking Status Former   Smokeless Tobacco Never     Family History:   Family History   Problem Relation Age of Onset   • Heart attack Mother    • Diabetes Mother    • Prostate cancer Father        Review of previous medical records was  completed       Meds/Allergies   all current active meds have been reviewed, current meds:   Current Facility-Administered Medications   Medication Dose Route Frequency   • acetaminophen (TYLENOL) tablet 650 mg  650 mg Oral Q6H PRN   • [START ON 4/27/2023] amLODIPine (NORVASC) tablet 10 mg  10 mg Oral Daily   • apixaban (ELIQUIS) tablet 5 mg  5 mg Oral BID   • [START ON 4/27/2023] aspirin (ECOTRIN LOW STRENGTH) EC tablet 81 mg  81 mg Oral Daily   • [START ON 4/27/2023] atorvastatin (LIPITOR) tablet 40 mg  40 mg Oral Daily   • calcium carbonate (TUMS) chewable tablet 1,000 mg  1,000 mg Oral Daily PRN   • [START ON 4/27/2023] citalopram (CeleXA) tablet 20 mg  20 mg Oral Daily   • docusate sodium (COLACE) capsule 100 mg  100 mg Oral BID   • donepezil (ARICEPT) tablet 5 mg  5 mg Oral BID   • hydrALAZINE (APRESOLINE) tablet 25 mg  25 mg Oral TID   • insulin lispro (HumaLOG) 100 units/mL subcutaneous injection 1-6 Units  1-6 Units Subcutaneous TID AC   • insulin lispro (HumaLOG) 100 units/mL subcutaneous injection 1-6 Units  1-6 Units Subcutaneous HS   • [START ON 4/27/2023] losartan (COZAAR) tablet 50 mg  50 mg Oral Daily   • [START ON 4/27/2023] metoprolol succinate (TOPROL-XL) 24 hr tablet 100 mg  100 mg Oral Daily   • ondansetron (ZOFRAN) injection 4 mg  4 mg Intravenous Q6H PRN   • [START ON 4/27/2023] pantoprazole (PROTONIX) EC tablet 20 mg  20 mg Oral Early Morning   • senna (SENOKOT) tablet 17 2 mg  2 tablet Oral Daily PRN   • sodium chloride 0 9 % infusion  100 mL/hr Intravenous Continuous   • tamsulosin (FLOMAX) capsule 0 4 mg  0 4 mg Oral Daily With Dinner    and PTA meds:   Prior to Admission Medications   Prescriptions Last Dose Informant Patient Reported? Taking? amLODIPine (NORVASC) 10 mg tablet   Yes No   Sig: Take 10 mg by mouth daily   apixaban (Eliquis) 5 mg   No No   Sig: Take 1 tablet (5 mg total) by mouth 2 (two) times a day   aspirin (ECOTRIN LOW STRENGTH) 81 mg EC tablet   No No   Sig: Take 1 tablet (81 mg total) by mouth daily Do not start before April 19, 2023     atorvastatin (LIPITOR) 40 mg tablet   Yes No   Sig: Take 40 mg by mouth daily   citalopram (CeleXA) 20 mg tablet   Yes No   Sig: Take 20 mg by mouth daily   donepezil (ARICEPT) 5 mg tablet   No No   Sig: TAKE 1 TABLET TWICE A DAY   hydrALAZINE (APRESOLINE) 25 mg tablet   Yes No   Sig: Take 25 mg by mouth 3 (three) times a day   hydrochlorothiazide (HYDRODIURIL) 25 mg tablet   Yes No   Sig: Take 25 mg by mouth daily   lidocaine (Lidoderm) 5 %   No No   Sig: Apply 1 patch topically over 12 hours daily Remove & Discard patch within 12 hours or as directed by MD   losartan (COZAAR) 50 mg tablet   Yes Yes   Sig: Take 50 mg by mouth daily   metFORMIN (GLUCOPHAGE) 500 mg tablet   No No   Sig: Take 1 tablet (500 mg total) by mouth daily with breakfast   metoprolol succinate (TOPROL-XL) 100 mg 24 hr tablet   Yes No   Sig: Take 100 mg by mouth daily   omeprazole (PriLOSEC OTC) 20 MG tablet   Yes No   Sig: Take 20 mg by mouth daily   sildenafil (REVATIO) 20 mg tablet   Yes No   Sig: TAKE 2 3 TABLETS BY MOUTH AS NEEDED FOR SEXUAL ACTIVITY   tamsulosin (FLOMAX) 0 4 mg   Yes No   Sig: Take 0 4 mg by mouth daily with dinner      Facility-Administered Medications: None       Allergies   Allergen Reactions   • Lisinopril Cough       Objective   Vitals:Blood pressure 142/69, pulse 66, temperature (!) 97 1 °F (36 2 °C), resp  rate 16, weight 103 kg (227 lb 1 2 oz), SpO2 97 %  ,Body mass index is 34 53 kg/m²  No intake or output data in the 24 hours ending 04/26/23 1809    Invasive Devices: Invasive Devices     Peripheral Intravenous Line  Duration           Peripheral IV 04/26/23 Distal;Right;Upper;Ventral (anterior) Arm <1 day                Physical Exam  Vitals and nursing note reviewed  Constitutional:       General: He is not in acute distress  Appearance: He is well-developed  HENT:      Head: Normocephalic and atraumatic  Eyes:      Extraocular Movements: EOM normal       Conjunctiva/sclera: Conjunctivae normal       Pupils: Pupils are equal, round, and reactive to light  Cardiovascular:      Rate and Rhythm: Normal rate  Pulmonary:      Effort: Pulmonary effort is normal    Abdominal:      Palpations: Abdomen is soft  Tenderness: There is no abdominal tenderness  Musculoskeletal:         General: No swelling  Cervical back: Neck supple  Skin:     General: Skin is warm and dry  Capillary Refill: Capillary refill takes less than 2 seconds     Neurological:      Mental Status: He is alert and oriented to person, place, and time  Motor: Motor strength is normal       Coordination: Finger-Nose-Finger Test and Heel to [facility] Test normal       Deep Tendon Reflexes:      Reflex Scores:       Tricep reflexes are 2+ on the right side and 2+ on the left side  Bicep reflexes are 2+ on the right side and 2+ on the left side  Brachioradialis reflexes are 2+ on the right side and 2+ on the left side  Patellar reflexes are 2+ on the right side and 2+ on the left side  Achilles reflexes are 2+ on the right side and 2+ on the left side  Psychiatric:         Mood and Affect: Mood normal          Speech: Speech normal        Neurologic Exam     Mental Status   Oriented to person, place, and time  Speech: speech is normal   Knowledge: good  Able to name object  Able to repeat  Did not realize he was in the hospital and for some reason stated that he was in pain house    Was unable to name his age or correct date   Seems to be confused      Cranial Nerves     CN II   Visual fields full to confrontation  CN III, IV, VI   Pupils are equal, round, and reactive to light  Extraocular motions are normal      CN V   Facial sensation intact  CN VII   Facial expression full, symmetric  CN VIII   CN VIII normal      CN IX, X   CN IX normal    CN X normal      CN XI   CN XI normal      CN XII   CN XII normal      Motor Exam   Overall muscle tone: normal    Strength   Strength 5/5 throughout       Sensory Exam   Light touch normal    Pinprick normal      Gait, Coordination, and Reflexes     Coordination   Finger to nose coordination: normal  Heel to shin coordination: normal    Reflexes   Right brachioradialis: 2+  Left brachioradialis: 2+  Right biceps: 2+  Left biceps: 2+  Right triceps: 2+  Left triceps: 2+  Right patellar: 2+  Left patellar: 2+  Right achilles: 2+  Left achilles: 2+  Right plantar: normal  Left plantar: normal  Right ankle clonus: absent  Left ankle clonus: absent      NIHSS:  1a Level of Consciousness: 0 = Alert   1b  LOC Questions: 2 = Answers neither correctly   1c  LOC Commands: 0 = Obeys both correctly   2  Best Gaze: 0 = Normal   3  Visual: 0 = No visual field loss   4  Facial Palsy: 0=Normal symmetric movement   5a  Motor Right Arm: 0=No drift, limb holds 90 (or 45) degrees for full 10 seconds   5b  Motor Left Arm: 0=No drift, limb holds 90 (or 45) degrees for full 10 seconds   6a  Motor Right Le=No drift, limb holds 90 (or 45) degrees for full 10 seconds   6b  Motor Left Le=No drift, limb holds 90 (or 45) degrees for full 10 seconds   7  Limb Ataxia:  0=Absent   8  Sensory: 0=Normal; no sensory loss   9  Best Language:  0=No aphasia, normal   10  Dysarthria: 0=Normal articulation   11  Extinction and Inattention (formerly Neglect): 0=No abnormality   Total Score: 2     Time NIHSS was completed: 3:20pm    Modified [place] Score:  1 (No significant disability  Able to carry out all usual activities, despite some symptoms)    Lab Results:   I have personally reviewed pertinent reports  , CBC:   Results from last 7 days   Lab Units 23  1540   WBC Thousand/uL 12 38*   RBC Million/uL 4 91   HEMOGLOBIN g/dL 15 9   HEMATOCRIT % 45 2   MCV fL 92   PLATELETS Thousands/uL 316   , BMP/CMP:   Results from last 7 days   Lab Units 23  1540   SODIUM mmol/L 130*   POTASSIUM mmol/L 3 7   CHLORIDE mmol/L 100   CO2 mmol/L 25   BUN mg/dL 25   CREATININE mg/dL 1 15   CALCIUM mg/dL 8 9   EGFR ml/min/1 73sq m 66   , Vitamin B12:   , HgBA1C:   , TSH:   , Coagulation:   Results from last 7 days   Lab Units 23  1540   INR  1 15   , Lipid Profile:   , Ammonia:   , Urinalysis:       Invalid input(s): URIBILINOGEN, Drug Screen:   , Medication Drug Levels:       Invalid input(s): CARBAMAZEPINE,  PHENOBARB, LACOSAMIDE, OXCARBAZEPINE  Imaging Studies: I have personally reviewed pertinent reports     and I have personally reviewed pertinent films in PACS  EKG, Pathology, and Other Studies: I have personally reviewed pertinent reports  and I have personally reviewed pertinent films in PACS  VTE Prophylaxis: Sequential compression device (Venodyne)     Code Status: Level 1 - Full Code  Advance Directive and Living Will:      Power of :    POLST:      Counseling / Coordination of Care  Total time spent today 40 minutes  Greater than 50% of total time was spent with the patient and / or family counseling and / or coordination of care   A description of the counseling / coordination of care: yes

## 2023-04-26 NOTE — TELEPHONE ENCOUNTER
Hi,     So some of these symptoms will take time to return, recommend PT, will place an order       Thanks,   Saleem

## 2023-04-26 NOTE — ASSESSMENT & PLAN NOTE
58year old male with HTN, HLD, DM2, prior R MCA CVA s/p thrombectomy in March 2019 w/ no etiology found despite years of loop recorder (and was maintained on Plavix 75mg after that), known severe R M1 stenosis, prior tobacco use, recent L MCA stroke (had aphasia and was empirically AC eliquis 5mg BID w/ ASA 81mg at that time due to ESUSx2) 04/16/2023 (was on came in as a Stroke alert on 4/26/2023  3:30 PM with initial NIHSS of 2 (for LOC questions) and LKW 2:30pm, initial Blood Pressure: 154/71  Initial presenting deficits were L facial droop, R sided weakness, and AMS but on actual exam confused/altered unable to give correct month and correct age    As a result of past stroke within the past month and on eliquis pt was determined to not be a candidate for thrombolysis (TNK)  Has had previous Loop recorder that was negative for any arrhythmias  While here patient had noted new multifocal strokes  Switched from ASA 81mg to Brilinta 90mg BID  prior work-up including IVY (no PFO) and loop recorder without evidence of Afib  CT of chest abdomen and pelvis with no evidence of malignancy 04/17/2023  Thrombosis panel done in 04/18/2023 that was only abnl for AT3 that was low in the acute setting  Previous TTE 04/18/23 showed ED of 55% and nl wall motion and mildly dilated L atrium  • Exam on 05/01/23: appears aphasic unable to give correct month and correct age and expresses frustration, more frontal aphasia, paraphasic errors, R homonymous hemianopia, R leaning gait and small steppage   • Current Blood Pressure: 137/71, BP over 24 hours: BP  Min: 110/89  Max: 137/71   • Vascular risk factors: past L MCA and R MCA stroke, DM, HTN, HLD  • Home meds: eliquis 5mg BID, ASA 81mg     Workup:  Lab Results   Component Value Date    HGBA1C 5 8 (H) 04/27/2023    CHOLESTEROL 109 04/27/2023    LDLCALC 40 04/27/2023    TRIG 166 (H) 04/27/2023    INR 1 15 04/26/2023      • CTH: No acute intracranial abnormality   Chronic microangiopathy  Small evolving subacute infarct in the left parietal periventricular white matter  Other small recent infarcts better visualized on previous MRI  • CTA: Stable moderate (60 to 65%) stenosis in the bilateral internal carotid arteries  Stable severe stenosis distal right M1 segment  Stable severe stenosis proximal left M2    • MRI: Compared to the prior recent MRI examination dated 4/17/2023, interval increase in size of diffusion abnormality in the left parietal subcortical and periventricular white matter consistent with acute to subacute ischemia  New small focus of gyral diffusion abnormality in the right frontal parietal region (4:20)  Persistent additional small foci of diffusion abnormality in the left periatrial and parieto-occipital region  No acute hemorrhage or midline shift  Stable moderate chronic microangiopathic changes within the brain  • Echocardiogram: EF of 55% and nl wall motion and mildly dilated L atrium  • IVY in 2019 nl and no soruce of cardiac embolism; LEFT ATRIUM: Size was normal  No thrombus was identified  APPENDAGE: The appendage was small  No thrombus was identified  DOPPLER: The function was mildly reduced (mildly reduced emptying velocity)  • Telemetry: negative  • Repeat Brain MRI: appears more evolution of past strokes moreso in L posterior parietal area, some noted  • TTE: ejection fraction is 55-60%  Systolic function is normal  Wall motion cannot be accurately assessed  lipomatous hypertrophy of the interatrial septum  No PFO  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  Left Atrial Appendage: There is reduced function  There is no thrombus  • Carotid Dopplers: STEVE <50% stenosis noted in the internal carotid artery  Plaque isheterogenous and irregular  LICA 52-84% stenosis noted in the internal carotid artery  Plaque is heterogenous and irregular  • Angiogram: Successful left carotid artery stenting below the level of C1    No evidence of intracranial vasculitis  There is evidence of intracranial atherosclerotic disease  • Flow cyto: no abnormalities in blast in lymphoid or myeloid  • D-Dimer: 1 13 (elevated)  • ESR: 29  • CRP: 7 9      Pertinent scores:  - NIHSS: 2  Stroke Modified [place] Score: 0 (No baseline symptoms/disability)    Impression: gerstmann syndrome (finger agnosia, acalculia, agraphia, left-right disorientation)  New multiple strokes difficult although suspicious central/cardioembolic cause give dilated L atrium as well as noted  Severe atherosclerosis in L and R MCA w/ R parietal strokes w/ noted irregular L and R ICA plaques (50-60%) s/p L ICA stent     Plan:  - Discussed plan with neurology attending, Dr Belén Gar  - Antiplatelet agents: continue BRILINTA 90mg BID and ASA 81mg for noted L ICA stent   - as per neurosurgery  - off eliquis  - in regards to R ICA, would recommend outpatient f/u w/ either vascular surgery   - recommend cardiology consult for loop recorder placement   - Given noted past history of prior cognitive impairment and previous MOCA of 18/30 in past neurology outpatient visits, would recommend outpatient neurocognitive evaluation  - BP: allow for normotension  - atorvastatin 40mg qhs  - Maintain glucose <180, SSI for coverage if indicated  - Medical management as per primary team appreciated  - continue w/ correction of any toxic/metabolic abnormalities  - DVT ppx and SCDs  - Monitor on telemetry  - PT/OT/Speech/PM&R input recommended rehab   - patient would likely benefit from speech therapy outpatient  - Stroke education  - rest of care as per primary team  - No other further recommendations from the inpatient Neurology perspective  - Please contact Neurology any further questions

## 2023-04-27 ENCOUNTER — APPOINTMENT (INPATIENT)
Dept: RADIOLOGY | Facility: HOSPITAL | Age: 63
End: 2023-04-27

## 2023-04-27 PROBLEM — D72.829 LEUKOCYTOSIS: Status: ACTIVE | Noted: 2023-04-27

## 2023-04-27 PROBLEM — I63.9 STROKE (HCC): Status: ACTIVE | Noted: 2023-04-26

## 2023-04-27 PROBLEM — E11.9 TYPE 2 DIABETES MELLITUS, WITHOUT LONG-TERM CURRENT USE OF INSULIN (HCC): Status: RESOLVED | Noted: 2019-04-03 | Resolved: 2023-04-27

## 2023-04-27 PROBLEM — E87.6 HYPOKALEMIA: Status: ACTIVE | Noted: 2023-04-27

## 2023-04-27 PROBLEM — R73.03 PREDIABETES: Status: ACTIVE | Noted: 2023-04-27

## 2023-04-27 LAB
ANION GAP SERPL CALCULATED.3IONS-SCNC: 4 MMOL/L (ref 4–13)
ATRIAL RATE: 77 BPM
BUN SERPL-MCNC: 29 MG/DL (ref 5–25)
CALCIUM SERPL-MCNC: 9.1 MG/DL (ref 8.3–10.1)
CHLORIDE SERPL-SCNC: 105 MMOL/L (ref 96–108)
CHOLEST SERPL-MCNC: 109 MG/DL
CO2 SERPL-SCNC: 26 MMOL/L (ref 21–32)
CREAT SERPL-MCNC: 0.99 MG/DL (ref 0.6–1.3)
ERYTHROCYTE [DISTWIDTH] IN BLOOD BY AUTOMATED COUNT: 13.3 % (ref 11.6–15.1)
EST. AVERAGE GLUCOSE BLD GHB EST-MCNC: 120 MG/DL
FLUAV RNA RESP QL NAA+PROBE: NEGATIVE
FLUBV RNA RESP QL NAA+PROBE: NEGATIVE
GFR SERPL CREATININE-BSD FRML MDRD: 80 ML/MIN/1.73SQ M
GLUCOSE SERPL-MCNC: 107 MG/DL (ref 65–140)
GLUCOSE SERPL-MCNC: 108 MG/DL (ref 65–140)
GLUCOSE SERPL-MCNC: 109 MG/DL (ref 65–140)
GLUCOSE SERPL-MCNC: 109 MG/DL (ref 65–140)
GLUCOSE SERPL-MCNC: 91 MG/DL (ref 65–140)
HBA1C MFR BLD: 5.8 %
HCT VFR BLD AUTO: 45.3 % (ref 36.5–49.3)
HDLC SERPL-MCNC: 36 MG/DL
HGB BLD-MCNC: 15.7 G/DL (ref 12–17)
LDLC SERPL CALC-MCNC: 40 MG/DL (ref 0–100)
MAGNESIUM SERPL-MCNC: 2.4 MG/DL (ref 1.6–2.6)
MCH RBC QN AUTO: 32.5 PG (ref 26.8–34.3)
MCHC RBC AUTO-ENTMCNC: 34.7 G/DL (ref 31.4–37.4)
MCV RBC AUTO: 94 FL (ref 82–98)
P AXIS: 60 DEGREES
PA ADP BLD-ACNC: 434 ARU
PHOSPHATE SERPL-MCNC: 3.6 MG/DL (ref 2.3–4.1)
PLATELET # BLD AUTO: 308 THOUSANDS/UL (ref 149–390)
PMV BLD AUTO: 10.7 FL (ref 8.9–12.7)
POTASSIUM SERPL-SCNC: 3.3 MMOL/L (ref 3.5–5.3)
PR INTERVAL: 144 MS
PROCALCITONIN SERPL-MCNC: <0.05 NG/ML
QRS AXIS: 40 DEGREES
QRSD INTERVAL: 96 MS
QT INTERVAL: 358 MS
QTC INTERVAL: 405 MS
RBC # BLD AUTO: 4.83 MILLION/UL (ref 3.88–5.62)
RSV RNA RESP QL NAA+PROBE: NEGATIVE
SARS-COV-2 RNA RESP QL NAA+PROBE: NEGATIVE
SODIUM SERPL-SCNC: 135 MMOL/L (ref 135–147)
T WAVE AXIS: -1 DEGREES
TRIGL SERPL-MCNC: 166 MG/DL
VENTRICULAR RATE: 77 BPM
WBC # BLD AUTO: 11.01 THOUSAND/UL (ref 4.31–10.16)

## 2023-04-27 RX ORDER — HYDROCHLOROTHIAZIDE 25 MG/1
25 TABLET ORAL DAILY
Status: DISCONTINUED | OUTPATIENT
Start: 2023-04-27 | End: 2023-05-11

## 2023-04-27 RX ORDER — CLOPIDOGREL BISULFATE 75 MG/1
75 TABLET ORAL DAILY
Status: DISCONTINUED | OUTPATIENT
Start: 2023-04-27 | End: 2023-04-27

## 2023-04-27 RX ORDER — POTASSIUM CHLORIDE 20 MEQ/1
40 TABLET, EXTENDED RELEASE ORAL ONCE
Status: COMPLETED | OUTPATIENT
Start: 2023-04-27 | End: 2023-04-27

## 2023-04-27 RX ORDER — NICOTINE 21 MG/24HR
14 PATCH, TRANSDERMAL 24 HOURS TRANSDERMAL DAILY
Status: DISCONTINUED | OUTPATIENT
Start: 2023-04-27 | End: 2023-05-07

## 2023-04-27 RX ADMIN — HYDROCHLOROTHIAZIDE 25 MG: 25 TABLET ORAL at 15:28

## 2023-04-27 RX ADMIN — DOCUSATE SODIUM 100 MG: 100 CAPSULE, LIQUID FILLED ORAL at 17:46

## 2023-04-27 RX ADMIN — DONEPEZIL HYDROCHLORIDE 5 MG: 5 TABLET ORAL at 09:14

## 2023-04-27 RX ADMIN — TAMSULOSIN HYDROCHLORIDE 0.4 MG: 0.4 CAPSULE ORAL at 15:30

## 2023-04-27 RX ADMIN — ASPIRIN 81 MG: 81 TABLET, COATED ORAL at 09:15

## 2023-04-27 RX ADMIN — APIXABAN 5 MG: 5 TABLET, FILM COATED ORAL at 17:46

## 2023-04-27 RX ADMIN — HYDRALAZINE HYDROCHLORIDE 25 MG: 25 TABLET, FILM COATED ORAL at 09:15

## 2023-04-27 RX ADMIN — ATORVASTATIN CALCIUM 40 MG: 40 TABLET, FILM COATED ORAL at 09:14

## 2023-04-27 RX ADMIN — CITALOPRAM HYDROBROMIDE 20 MG: 20 TABLET ORAL at 09:16

## 2023-04-27 RX ADMIN — METOPROLOL SUCCINATE 100 MG: 100 TABLET, EXTENDED RELEASE ORAL at 09:14

## 2023-04-27 RX ADMIN — LOSARTAN POTASSIUM 50 MG: 50 TABLET, FILM COATED ORAL at 09:14

## 2023-04-27 RX ADMIN — TICAGRELOR 90 MG: 90 TABLET ORAL at 20:35

## 2023-04-27 RX ADMIN — HYDRALAZINE HYDROCHLORIDE 25 MG: 25 TABLET, FILM COATED ORAL at 20:38

## 2023-04-27 RX ADMIN — CLOPIDOGREL BISULFATE 75 MG: 75 TABLET ORAL at 11:00

## 2023-04-27 RX ADMIN — POTASSIUM CHLORIDE 40 MEQ: 1500 TABLET, EXTENDED RELEASE ORAL at 09:15

## 2023-04-27 RX ADMIN — APIXABAN 5 MG: 5 TABLET, FILM COATED ORAL at 09:15

## 2023-04-27 RX ADMIN — DONEPEZIL HYDROCHLORIDE 5 MG: 5 TABLET ORAL at 17:46

## 2023-04-27 RX ADMIN — AMLODIPINE BESYLATE 10 MG: 10 TABLET ORAL at 09:16

## 2023-04-27 RX ADMIN — PANTOPRAZOLE SODIUM 20 MG: 20 TABLET, DELAYED RELEASE ORAL at 05:16

## 2023-04-27 RX ADMIN — HYDRALAZINE HYDROCHLORIDE 25 MG: 25 TABLET, FILM COATED ORAL at 15:28

## 2023-04-27 NOTE — ASSESSMENT & PLAN NOTE
· Likely due to poor oral intake and HCTZ use  · Holding HCTZ  · S/p IV normal saline  · Now resolved

## 2023-04-27 NOTE — PHYSICAL THERAPY NOTE
"   PHYSICAL THERAPY EVALUATION  NAME:  Maria D Olivo: 04/27/23    AGE:   58 y o  Mrn:   [id number]  ADMIT DX:  Confusion [R41 0]  Stroke [facility]) [I63 9]  [place] (NIH) Stroke Scale level of consciousness score 0, alert; keenly responsive [Z78 9]    Past Medical History:   Diagnosis Date    Depression     Diabetes mellitus ([address] )     Dyslipidemia 03/26/2019    Hyperlipidemia     Hypertension     Stroke [facility])      Past Surgical History:   Procedure Laterality Date    BACK SURGERY      IR STROKE ALERT  3/19/2019    SHOULDER SURGERY         Length Of Stay: 1  PHYSICAL THERAPY EVALUATION :    04/27/23 0919   PT Last Visit   PT Visit Date 04/27/23   Note Type   Note type Evaluation   Pain Assessment   Pain Assessment Tool 0-10   Pain Score No Pain   Restrictions/Precautions   Weight Bearing Precautions Per Order No   Other Precautions Impulsive; Chair Alarm; Bed Alarm;Cognitive;Multiple lines; Fall Risk  (visual impairment; dysarthria/ aphasia)   Home Living   Type of [address] [place] [address] Two level   Bathroom Shower/Tub Tub/shower unit   Bathroom Toilet Raised   Bathroom Equipment Grab bars in shower   Bathroom Accessibility Accessible   Prior Function   Level of [place] Independent with ADLs; Independent with functional mobility   Lives With Spouse   Receives Help From Family  (spouse, son)   IADLs Independent with driving; Independent with meal prep; Independent with medication management   Falls in the last 6 months 0   Vocational Retired   Cognition   Overall Cognitive Status Impaired  (pt reports \"feeling foggy still\")   Attention Attends with cues to redirect   Orientation Level Oriented to person;Oriented to time;Oriented to place  (choice cues for place)   Memory Decreased recall of precautions;Decreased recall of recent events   Following Commands Follows one step commands with increased time or repetition   RUE Assessment   RUE Assessment WFL   LUE Assessment   LUE Assessment WFL " RLE Assessment   RLE Assessment WFL   LLE Assessment   LLE Assessment WFL   Vision-Basic Assessment   Current Vision Wears glasses only for reading   Patient Visual Report   (Difficulty reading news paper title -large print)   Coordination   Movements are Fluid and Coordinated   (slight deficits R UE compared to L)   Light Touch   RLE Light Touch Grossly intact   LLE Light Touch Grossly intact   Sharp/Dull   RLE Sharp/Dull Grossly intact   LLE Sharp/Dull Grossly intact   Proprioception   RLE Proprioception Grossly intact   LLE Proprioception Grossly Intact   Bed Mobility   Supine to Sit 4  Minimal assistance   Additional items Increased time required;Verbal cues; Bedrails   Transfers   Sit to Stand 4  Minimal assistance   Additional items Assist x 1; Increased time required;Verbal cues   Stand to Sit 4  Minimal assistance   Additional items Assist x 1; Increased time required;Verbal cues   Ambulation/Elevation   Gait pattern Excessively slow; Short stride; Inconsistent joel;Decreased heel strike;Decreased foot clearance   Gait Assistance 4  Minimal assist   Assistive Device None   Distance 39' + 39' w/ seated rest b/t   Stair Management Assistance Not tested   Balance   Static Sitting Fair +   Dynamic Sitting Fair   Static Standing Fair -   Dynamic Standing Poor +   Ambulatory Poor  (w/o AD)   Activity Tolerance   Activity Tolerance Patient limited by fatigue   Medical Staff Made Aware care coordination w/ OT + CM for d/c planning   Nurse Made Aware yes- cleared for assessment   Assessment   Prognosis Good   Problem List Decreased endurance; Impaired balance;Decreased mobility; Decreased coordination; Impaired judgement;Decreased safety awareness; Impaired vision;Obesity   Assessment Pt is 58 y o  male HTN, HLD, DM2, prior R MCA CVA s/p thrombectomy in March 2019, known severe R M1 stenosis, prior tobacco use, recent L MCA stroke (had aphasia) 04/16/2023 (was on plavix 75mg) came in as a Stroke alert on 4/26/2023  3:30 PM with initial NIHSS of 2 and LKW 2:30pm, initial Blood Pressure: 154/71  Initial presenting deficits were L facial droop, R sided weakness, and AMS (per EMR)  MRI: Compared to the prior recent MRI examination dated 4/17/2023, interval increase in size of diffusion abnormality in the left parietal subcortical and periventricular white matter consistent with acute to subacute ischemia  New small focus of gyral diffusion abnormality in the right frontal parietal region (4:20)  Persistent additional small foci of diffusion abnormality in the left periatrial and parieto-occipital region  Current dx/ problem list includes: CVA; AMS; confusion  Comorbidities affecting pt's physical performance at time of assessment listed aboveDue to acute medical issues, ongoing medical workup for primary dx; pain, fall risk, increased reliance on more restrictive AD compared to baseline;  decreased activity tolerance compared to baseline, increased assistance needed from caregiver at current time, continuous telemetry monitoring, multiple lines, decline in overall functional mobility status; health management issues; note unstable clinical picture (high complexity)       Prior to admission, pt was living w/ spouse in [facility] w/ bed and bath located on FF; 1 CHRIS ; pt reports driving (not recently) 0 falls and indep not using AD for mobility  Pt reports ambulating w/o AD at baseline and liking to drive his Motorcycle ([employer])  Currently pt  is requiring CGA A for bed skills; Jacky/ CGA + cues for functional transfers and* for ambulation w/ Jacky w/ possible slight R inattention + aphasia      Pt presents functioning below baseline and currently w/ overall mobility deficits 2* to: decreased LE strength/AROM R> L; limited flexibility;  generalized weakness/ deconditioning; decreased endurance; decreased activity tolerance; decreased coordination on R (gross motor); impaired balance; gait deviations; decreased safety awareness; aphasia; inattention; SOB/TURNER; fatigue; impaired safety and judgement; limited insight into current deficits; bed/ chair alarms; multiple lines; Pt currently at risk for falls  (Please find additional objective findings from PT assessment regarding body systems outlined above )     Pt will continue to benefit from skilled PT interventions to address stated impairments; to maximize functional potential; for ongoing pt/ family training; and DME needs  PT is currently recommending PMR consult ; rehab on d/c to maximize functional potential prior to d/c home  Goals   Patient Goals get better/ go home   STG Expiration Date 05/11/23   Short Term Goal #1 In 14 days pt will complete: 1) Bed mobility skills with S to facilitate safe return to previous living environment and decrease burden on caregivers  2) Functional transfers with S  to facilitate safe return to previous living environment  3) Ambulation with least restrictive AD > 350' all terrains MI/ Indep' without LOB and stable vitals for safe ambulation in home/ community environment  4) Stair training up/ down flight step/s with 1 rail/s  and S / MI for safe access to previous living environment and to increase community access  5) Improve balance by 1 grade in order to decrease fall risk  6) Improve LE strength grades by 1 to increase independence w/ all functional mobility, transfers and gait  7) PT for ongoing pt and family education; DME needs and D/C planning to promote highest level of function in least restrictive environment     PT Treatment Day 0   Recommendation   PT Discharge Recommendation Post acute rehabilitation services  (+ PMR)   Equipment Recommended   (TBD)   AM-PAC Basic Mobility Inpatient   Turning in Flat Bed Without Bedrails 3   Lying on Back to Sitting on Edge of Flat Bed Without Bedrails 3   Moving Bed to Chair 3   Standing Up From Chair Using Arms 3   Walk in Room 2   Climb 3-5 Stairs With Railing 1   Basic Mobility Inpatient Raw Score 15   Basic Mobility Standardized Score 36 97   Highest Level Of Mobility   -[facility] Goal 4: Move to chair/commode   -[facility] Achieved 7: Walk 25 feet or more   End of Consult   Patient Position at End of Consult Bedside chair;Bed/Chair alarm activated; All needs within reach

## 2023-04-27 NOTE — PROGRESS NOTES
[facility]  Progress Note  Name: Karey Vera  MRN: [id number]  Unit/Bed#: [facility] 703-01 I Date of Admission: 4/26/2023   Date of Service: 4/27/2023 I Hospital Day: 1    Assessment/Plan   * Stroke-like symptoms  Assessment & Plan  Pt presented to the ER as a stroke alert with history of known severe R M1 stenosis, prior tobacco use, recent L MCA stroke (had aphasia) 04/16/2023 came in as a Stroke alert on 4/26/2023  NIHSS of 2  Initial presenting deficits were L facial droop, R sided weakness, and AMS   As a result of past stroke within the past month and on eliquis pt was determined to not be a candidate for thrombolysis   · 4/26/2023 CTA of the head and neck-Stable moderate (60 to 65%) stenosis in the bilateral internal carotid arteries  Stable severe stenosis distal right M1 segment  Stable severe stenosis proximal left M2  No new intracranial stenosis or large vessel occlusion  · MRI brain 4/27- Compared to the prior recent MRI examination dated 4/17/2023, interval increase in size of diffusion abnormality in the left parietal subcortical and periventricular white matter consistent with acute to subacute ischemia  New small focus of gyral diffusion abnormality in the right frontal parietal region  Persistent additional small foci of diffusion abnormality in the left periatrial and parieto-occipital region  No acute hemorrhage or midline shift  Stable moderate chronic microangiopathic changes within the brain  · Stroke pathway  · Allow for permissive hypertension  · A1c 5 8 lipid panel  · Continue with home aspirin 81 mg- pending aspirin platelet inhibitor lab  · Continue with Eliquis 5 mg twice daily  · No need for repeat echocardiogram at this time as echo was recently completed on previous admission 4/18/2023  · Echo-EF 55%  Systolic function is normal  Wall motion is normal   Grade 1 diastolic dysfunction left and right atrium mildly dilated    Mild aortic valve stenosis  Mild mitral and tricuspid valve regurgitation  · Pending EEG  · Monitor on telemetry for arrythmia  · Neurology following  · PT/OT/speech consults pending    Leukocytosis  Assessment & Plan  · Noted on admission with wbc 12 38--> 11 01  · Check chest xray   · Check procal level   · Trend wbcs   · Check covid     Hypokalemia  Assessment & Plan  · Replete     Prediabetes  Assessment & Plan  · a1c 5 8  · Currently holding metformin while hospitalized     Hyponatremia  Assessment & Plan  · Likely due to poor oral intake and HCTZ use  · Holding HCTZ  · S/p IV normal saline  · Now resolved     Chronic low back pain  Assessment & Plan  · Reports chronic lower back pain for 4 weeks which has not improved despite multiple trails of steroids, nsaids and muscle relaxants- currently denies any back pain at this time   · XR of lumbar spine ordered by PCP: No lumbar vertebral body fracture or subluxation  Multilevel degenerative changes  · PRN tylenol      Chronic ischemic right MCA stroke  Assessment & Plan  · Continue aspirin, Eliquis and statin  · PT/OT/ST  · Supportive care    Type 2 diabetes mellitus, without long-term current use of insulin [facility])  Assessment & Plan  Lab Results   Component Value Date    HGBA1C 5 8 (H) 04/27/2023       Recent Labs     04/26/23  1522 04/26/23  2233 04/27/23  0604   POCGLU 125 100 108       Blood Sugar Average: Last 72 hrs:  (P) 111   · A1c 5 8  · Hold metformin while inpatient  · Diabetic diet  · Fingerstick QID with sliding scale coverage    Adjustment reaction with anxiety  Assessment & Plan  · Continue celexa   · Provide supportive care     Nicotine dependence  Assessment & Plan  · Encourage cessation   · Add nicoderm patch     Primary hypertension  Assessment & Plan  · Per wife and his BP records, his BP has been uncontrolled  · Compliance has not been ideal until she started managing his medications    · Continue with metoprolol 100mg daily, losartan 50mg daily, hydralazine 25mg TID and amlodipine 10mg reed - monitor Bps and adjust PRN  · Holding HCTZ due to hyponatremia  · Low Na diet             VTE Pharmacologic Prophylaxis: VTE Score: 3 Moderate Risk (Score 3-4) - Pharmacological DVT Prophylaxis Ordered: apixaban (Eliquis)  Patient Centered Rounds: I performed bedside rounds with nursing staff today  Discussions with Specialists or Other Care Team Provider: d/w RN, d/w neurology     Education and Discussions with Family / Patient: Updated  (wife) via phone  Total Time Spent on Date of Encounter in care of patient: 35 minutes This time was spent on one or more of the following: performing physical exam; counseling and coordination of care; obtaining or reviewing history; documenting in the medical record; reviewing/ordering tests, medications or procedures; communicating with other healthcare professionals and discussing with patient's family/caregivers  Current Length of Stay: 1 day(s)  Current Patient Status: Inpatient   Certification Statement: The patient will continue to require additional inpatient hospital stay due to acute CVA  Discharge Plan: Anticipate discharge in 48-72 hrs to discharge location to be determined pending rehab evaluations  Code Status: Level 1 - Full Code    Subjective:   Patient is lying in bed  He reports that he is feeling frustrated because he is having difficulty with processing and expressing himself  Patient reports he just feels confused and feels that he is not back to his baseline  Patient denies any current pain  Patient denies any shortness of breath or cough  Denies any recent illnesses    Objective:     Vitals:   Temp (24hrs), Av 9 °F (36 6 °C), Min:97 1 °F (36 2 °C), Max:98 7 °F (37 1 °C)    Temp:  [97 1 °F (36 2 °C)-98 7 °F (37 1 °C)] 97 1 °F (36 2 °C)  HR:  [60-87] 87  Resp:  [16] 16  BP: (110-177)/(54-84) 177/84  SpO2:  [94 %-98 %] 95 %  Body mass index is 34 53 kg/m²       Input and Output Summary (last 24 hours): Intake/Output Summary (Last 24 hours) at 4/27/2023 1018  Last data filed at 4/27/2023 0800  Gross per 24 hour   Intake 261 67 ml   Output 500 ml   Net -238 33 ml       Physical Exam:   Physical Exam  Constitutional:       General: He is not in acute distress  Appearance: He is obese  He is not ill-appearing  HENT:      Head: Normocephalic and atraumatic  Mouth/Throat:      Mouth: Mucous membranes are moist    Eyes:      General: No scleral icterus  Conjunctiva/sclera: Conjunctivae normal       Pupils: Pupils are equal, round, and reactive to light  Cardiovascular:      Rate and Rhythm: Normal rate and regular rhythm  Pulses: Normal pulses  Heart sounds: Murmur heard  Pulmonary:      Effort: No respiratory distress  Breath sounds: Normal breath sounds  No wheezing  Comments: Crackles RLL  Abdominal:      General: Bowel sounds are normal  There is distension  Palpations: Abdomen is soft  Tenderness: There is no abdominal tenderness  Musculoskeletal:         General: No swelling or tenderness  Skin:     General: Skin is warm and dry  Findings: No erythema or rash  Neurological:      Mental Status: He is alert  Cranial Nerves: No facial asymmetry  Motor: No weakness  Comments: Oriented to person place and situation  Doesn't know the year  Speech slowed, difficulty with processing and expressing himself- expressive aphasia    Psychiatric:         Attention and Perception: Attention normal          Behavior: Behavior is slowed  Cognition and Memory: Cognition is impaired        Comments: frustrated          Additional Data:     Labs:  Results from last 7 days   Lab Units 04/27/23  0517   WBC Thousand/uL 11 01*   HEMOGLOBIN g/dL 15 7   HEMATOCRIT % 45 3   PLATELETS Thousands/uL 308     Results from last 7 days   Lab Units 04/27/23  0517   SODIUM mmol/L 135   POTASSIUM mmol/L 3 3*   CHLORIDE mmol/L 105   CO2 mmol/L 26   BUN mg/dL 29*   CREATININE mg/dL 0 99   ANION GAP mmol/L 4   CALCIUM mg/dL 9 1   GLUCOSE RANDOM mg/dL 109     Results from last 7 days   Lab Units 04/26/23  1540   INR  1 15     Results from last 7 days   Lab Units 04/27/23  0604 04/26/23  2233 04/26/23  1522   POC GLUCOSE mg/dl 108 100 125     Results from last 7 days   Lab Units 04/27/23  0517   HEMOGLOBIN A1C % 5 8*           Lines/Drains:  Invasive Devices     Peripheral Intravenous Line  Duration           Peripheral IV 04/27/23 Right Antecubital <1 day                  Telemetry:  Telemetry Orders (From admission, onward)             48 Hour Telemetry Monitoring  Continuous x 48 hours        References:    Telemetry Guidelines   Question:  Reason for 48 Hour Telemetry  Answer:  Arrhythmias Requiring Medical Therapy (eg  SVT, Vtach/fib, Bradycardia, Uncontrolled A-fib)                 Telemetry Reviewed: Normal Sinus Rhythm  Indication for Continued Telemetry Use: Acute CVA             Imaging: Reviewed radiology reports from this admission including: MRI brain    Recent Cultures (last 7 days):         Last 24 Hours Medication List:   Current Facility-Administered Medications   Medication Dose Route Frequency Provider Last Rate   • acetaminophen  650 mg Oral Q6H PRN David Roberto MD     • amLODIPine  10 mg Oral Daily David Roberto MD     • apixaban  5 mg Oral BID David Roberto MD     • atorvastatin  40 mg Oral Daily David Roberto MD     • calcium carbonate  1,000 mg Oral Daily PRN David Roberto MD     • citalopram  20 mg Oral Daily David Roberto MD     • clopidogrel  75 mg Oral Daily Jan Desai DO     • docusate sodium  100 mg Oral BID David Roberto MD     • donepezil  5 mg Oral BID Davdi Roberto MD     • hydrALAZINE  25 mg Oral TID David Roberto MD     • insulin lispro  1-6 Units Subcutaneous TID AC David Roberto MD     • insulin lispro  1-6 Units Subcutaneous HS David Roberto MD     • losartan  50 mg Oral Daily David Roberto MD     • metoprolol succinate  100 mg Oral Daily Jarrett Munson MD     • nicotine  14 mg Transdermal Daily ERIC Og     • ondansetron  4 mg Intravenous Q6H PRN Jarrett Munson MD     • pantoprazole  20 mg Oral Early Morning Jarrett Munson MD     • senna  2 tablet Oral Daily PRN Jarrett Munson MD     • tamsulosin  0 4 mg Oral Daily With Fidelia Womack MD          Today, Patient Was Seen By: ERIC Landaverde    **Please Note: This note may have been constructed using a voice recognition system  **

## 2023-04-27 NOTE — QUICK NOTE
I received a message via [place] text that the MRI brain without contrast has resulted and an immediate finding  The findings are listed below  MRI Brain wo: 04/26/2023:  Impression:  - Compared to the prior recent MRI examination dated 4/17/2023, interval increase in size of diffusion abnormality in the left parietal subcortical and periventricular white matter consistent with acute to subacute ischemia    - New small focus of gyral diffusion abnormality in the right frontal parietal region (4:20)  - Persistent additional small foci of diffusion abnormality in the left periatrial and parieto-occipital region   - No acute hemorrhage or midline shift   - Stable moderate chronic microangiopathic changes within the brain

## 2023-04-27 NOTE — PLAN OF CARE
Problem: PAIN - ADULT  Goal: Verbalizes/displays adequate comfort level or baseline comfort level  Description: Interventions:  - Encourage patient to monitor pain and request assistance  - Assess pain using appropriate pain scale  - Administer analgesics based on type and severity of pain and evaluate response  - Implement non-pharmacological measures as appropriate and evaluate response  - Consider cultural and social influences on pain and pain management  - Notify physician/advanced practitioner if interventions unsuccessful or patient reports new pain  Outcome: Progressing     Problem: INFECTION - ADULT  Goal: Absence or prevention of progression during hospitalization  Description: INTERVENTIONS:  - Assess and monitor for signs and symptoms of infection  - Monitor lab/diagnostic results  - Monitor all insertion sites, i e  indwelling lines, tubes, and drains  - Monitor endotracheal if appropriate and nasal secretions for changes in amount and color  - [place] appropriate cooling/warming therapies per order  - Administer medications as ordered  - Instruct and encourage patient and family to use good hand hygiene technique  - Identify and instruct in appropriate isolation precautions for identified infection/condition  Outcome: Progressing     Problem: SAFETY ADULT  Goal: Patient will remain free of falls  Description: INTERVENTIONS:  - Educate patient/family on patient safety including physical limitations  - Instruct patient to call for assistance with activity   - Consult OT/PT to assist with strengthening/mobility   - Keep Call bell within reach  - Keep bed low and locked with side rails adjusted as appropriate  - Keep care items and personal belongings within reach  - Initiate and maintain comfort rounds  - Make Fall Risk Sign visible to staff  - Offer Toileting every 2 Hours, in advance of need  - Initiate/Maintain bed alarm  - Obtain necessary fall risk management equipment: alarm  - Apply yellow socks and bracelet for high fall risk patients  - Consider moving patient to room near nurses station  Outcome: Progressing     Problem: DISCHARGE PLANNING  Goal: Discharge to home or other facility with appropriate resources  Description: INTERVENTIONS:  - Identify barriers to discharge w/patient and caregiver  - Arrange for needed discharge resources and transportation as appropriate  - Identify discharge learning needs (meds, wound care, etc )  - Arrange for interpretive services to assist at discharge as needed  - Refer to Case Management Department for coordinating discharge planning if the patient needs post-hospital services based on physician/advanced practitioner order or complex needs related to functional status, cognitive ability, or social support system  Outcome: Progressing     Problem: Knowledge Deficit  Goal: Patient/family/caregiver demonstrates understanding of disease process, treatment plan, medications, and discharge instructions  Description: Complete learning assessment and assess knowledge base  Interventions:  - Provide teaching at level of understanding  - Provide teaching via preferred learning methods  Outcome: Progressing     Problem: NEUROSENSORY - ADULT  Goal: Achieves stable or improved neurological status  Description: INTERVENTIONS  - Monitor and report changes in neurological status  - Monitor vital signs such as temperature, blood pressure, glucose, and any other labs ordered   - Initiate measures to prevent increased intracranial pressure  - Monitor for seizure activity and implement precautions if appropriate      Outcome: Progressing  Goal: Achieves maximal functionality and self care  Description: INTERVENTIONS  - Monitor swallowing and airway patency with patient fatigue and changes in neurological status  - Encourage and assist patient to increase activity and self care     - Encourage visually impaired, hearing impaired and aphasic patients to use assistive/communication devices  Outcome: Progressing     Problem: CARDIOVASCULAR - ADULT  Goal: Maintains optimal cardiac output and hemodynamic stability  Description: INTERVENTIONS:  - Monitor I/O, vital signs and rhythm  - Monitor for S/S and trends of decreased cardiac output  - Administer and titrate ordered vasoactive medications to optimize hemodynamic stability  - Assess quality of pulses, skin color and temperature  - Assess for signs of decreased coronary artery perfusion  - Instruct patient to report change in severity of symptoms  Outcome: Progressing  Goal: Absence of cardiac dysrhythmias or at baseline rhythm  Description: INTERVENTIONS:  - Continuous cardiac monitoring, vital signs, obtain 12 lead EKG if ordered  - Administer antiarrhythmic and heart rate control medications as ordered  - Monitor electrolytes and administer replacement therapy as ordered  Outcome: Progressing     Problem: METABOLIC, FLUID AND ELECTROLYTES - ADULT  Goal: Electrolytes maintained within normal limits  Description: INTERVENTIONS:  - Monitor labs and assess patient for signs and symptoms of electrolyte imbalances  - Administer electrolyte replacement as ordered  - Monitor response to electrolyte replacements, including repeat lab results as appropriate  - Instruct patient on fluid and nutrition as appropriate  Outcome: Progressing  Goal: Glucose maintained within target range  Description: INTERVENTIONS:  - Monitor Blood Glucose as ordered  - Assess for signs and symptoms of hyperglycemia and hypoglycemia  - Administer ordered medications to maintain glucose within target range  - Assess nutritional intake and initiate nutrition service referral as needed  Outcome: Progressing

## 2023-04-27 NOTE — PLAN OF CARE
Problem: PHYSICAL THERAPY ADULT  Goal: Performs mobility at highest level of function for planned discharge setting  See evaluation for individualized goals  Description:    Equipment Recommended:  (TBD)       See flowsheet documentation for full assessment, interventions and recommendations  Note: Prognosis: Good  Problem List: Decreased endurance, Impaired balance, Decreased mobility, Decreased coordination, Impaired judgement, Decreased safety awareness, Impaired vision, Obesity  Assessment: Pt is 58 y o  male HTN, HLD, DM2, prior R MCA CVA s/p thrombectomy in March 2019, known severe R M1 stenosis, prior tobacco use, recent L MCA stroke (had aphasia) 04/16/2023 (was on plavix 75mg) came in as a Stroke alert on 4/26/2023  3:30 PM with initial NIHSS of 2 and LKW 2:30pm, initial Blood Pressure: 154/71  Initial presenting deficits were L facial droop, R sided weakness, and AMS (per EMR)  MRI: Compared to the prior recent MRI examination dated 4/17/2023, interval increase in size of diffusion abnormality in the left parietal subcortical and periventricular white matter consistent with acute to subacute ischemia  New small focus of gyral diffusion abnormality in the right frontal parietal region (4:20)  Persistent additional small foci of diffusion abnormality in the left periatrial and parieto-occipital region  Current dx/ problem list includes: CVA; AMS; confusion     Comorbidities affecting pt's physical performance at time of assessment listed aboveDue to acute medical issues, ongoing medical workup for primary dx; pain, fall risk, increased reliance on more restrictive AD compared to baseline;  decreased activity tolerance compared to baseline, increased assistance needed from caregiver at current time, continuous telemetry monitoring, multiple lines, decline in overall functional mobility status; health management issues; note unstable clinical picture (high complexity)   Prior to admission, pt was living w/ spouse in [address] w/ bed and bath located on FF; 1 [address] ; pt reports driving (not recently) 0 falls and indep not using AD for mobility  Pt reports ambulating w/o AD at baseline and liking to drive his Motorcycle (trike/ [place])  Currently pt  is requiring CGA A for bed skills; Jacky/ CGA + cues for functional transfers and* for ambulation w/ Jacky w/ possible slight R inattention + aphasia  Pt presents functioning below baseline and currently w/ overall mobility deficits 2* to: decreased LE strength/AROM R> L; limited flexibility;  generalized weakness/ deconditioning; decreased endurance; decreased activity tolerance; decreased coordination on R (gross motor); impaired balance; gait deviations; decreased safety awareness; aphasia; inattention; SOB/TURNER; fatigue; impaired safety and judgement; limited insight into current deficits; bed/ chair alarms; multiple lines; Pt currently at risk for falls  (Please find additional objective findings from PT assessment regarding body systems outlined above ) Pt will continue to benefit from skilled PT interventions to address stated impairments; to maximize functional potential; for ongoing pt/ family training; and DME needs  PT is currently recommending PMR consult ; rehab on d/c to maximize functional potential prior to d/c home  PT Discharge Recommendation: Post acute rehabilitation services (+ PMR)    See flowsheet documentation for full assessment

## 2023-04-27 NOTE — UTILIZATION REVIEW
Initial Clinical Review    Admission: Date/Time/Statement:   Admission Orders (From admission, onward)     Ordered        04/26/23 1628  INPATIENT ADMISSION  Once                      Orders Placed This Encounter   Procedures   • INPATIENT ADMISSION     Standing Status:   Standing     Number of Occurrences:   1     Order Specific Question:   Level of Care     Answer:   Med Surg [16]     Order Specific Question:   Estimated length of stay     Answer:   More than 2 Midnights     Order Specific Question:   Certification     Answer:   I certify that inpatient services are medically necessary for this patient for a duration of greater than two midnights  See H&P and MD Progress Notes for additional information about the patient's course of treatment  ED Arrival Information     Expected   -    Arrival   4/26/2023 15:21    Acuity   Emergent            Means of arrival   Ambulance    Escorted by   [facility]    Service   Hospitalist    Admission type   Emergency            Arrival complaint   stroke alert           Chief Complaint   Patient presents with   • STROKE Alert     Pt brought in for unresp episode at home, new L facial droop, word difficulties/incorrect words; hx recent CVA; last known well 1415       Initial Presentation: 58 y o  male presents to ed from home via EMS for evaluation and treatment new L facial droop, word difficulties/ incorrect words  LKW 1415  Wife witnessed confusion, abnormal speech  Stroke alert  PMHX: HTN, DM, R MCA CVA  Clinical assessment significant for left facial droop, right sided weakness and disoriented to place / confused  Unable to stage his age or month  NHISS =2  WBC 12 38,   Imaging shows without acute new finding  Not a candidate for thrombolytic  Admit to inpatient med surg high concern for stroke recrudesence  Plan includes MRI, TTE, PT/OT/ST, permissive hypertension        Date: 4- 27-23   Day 2: inpatient med surg    Compared to prior MRI on 23, interval increase in size of diffusion abnormality in the L parietal subcortical, and periventricular white matter consistent with acute to subacute ischemia  ED Triage Vitals   23 1525 23 1525 23 1525 23 1525 23 1525   98 7 °F (37 1 °C) 77 16 154/71 97 %      Tympanic Monitor           Pain Score       4          23 103 kg (227 lb 1 2 oz)     Additional Vital Signs:    Date/Time Temp Pulse Resp BP MAP (mmHg) SpO2 O2 Device   23 09:11:29 -- 87 -- 177/84   Abnormal  115 95 % --   23 06:01:53 -- 70 -- 162/74 103 95 % --   23 04:27:59 -- 75 -- 149/74 99 95 % --   23 02:04:38 -- 83 -- 154/83 107 94 % --   23 00:24:31 -- 72 -- 151/73 99 96 % --   23 -- 73 -- 110/54 73 95 % --   23 211 -- 69 -- -- -- -- --   23 20:34:37 -- 60 -- -- -- 96 % --   23 -- 62 -- 134/66 89 95 % None (Room air)   23 17:51:03 97 1 °F   (36 2 °C)   Abnormal  66 -- 142/69 93 97 % None (Room air)   23 1715 -- 70 16 137/62 89 97 % None (Room air)   23 1625 -- 74 16 156/67 -- 98 % --   23 1610 -- 68 16 143/65 -- 98 % --   23 1555 -- 68 16 152/56 -- 97 % --   23 1540 -- 80 16 158/71 -- 97 % --   23 15:25:27 98 7 °F   (37 1 °C) 77 16 154/71 -- 97 % None (Room air)     NIHSS:  1a Level of Consciousness: 0 = Alert   1b  LOC Questions: 2 = Answers neither correctly   1c  LOC Commands: 0 = Obeys both correctly   2  Best Gaze: 0 = Normal   3  Visual: 0 = No visual field loss   4  Facial Palsy: 0=Normal symmetric movement   5a  Motor Right Arm: 0=No drift, limb holds 90 (or 45) degrees for full 10 seconds   5b  Motor Left Arm: 0=No drift, limb holds 90 (or 45) degrees for full 10 seconds   6a  Motor Right Le=No drift, limb holds 90 (or 45) degrees for full 10 seconds   6b  Motor Left Le=No drift, limb holds 90 (or 45) degrees for full 10 seconds   7  Limb Ataxia:  0=Absent   8   Sensory: 0=Normal; no sensory loss   9  Best Language:  0=No aphasia, normal   10  Dysarthria: 0=Normal articulation   11  Extinction and Inattention (formerly Neglect): 0=No abnormality   Total Score: 2             Pertinent Labs/Diagnostic Test Results: Wed Apr 26, 2023   1544 ECG 12 lead  This ECG was interpreted by me  The heart rate is 77, which is normal  The rhythm is regular  The axis is normal  The P waves are normal and the IN interval is normal  The QRS height is normal and width is normal  The ST segments are not elevated or depressed  The T waves are normal  The QT segment is normal  This ecg shows sinus rhythm with PVCs            MRI brain wo contrast   Final R (04/27 0439)      Compared to the prior recent MRI examination dated 4/17/2023, interval increase in size of diffusion abnormality in the left parietal subcortical and periventricular white matter consistent with acute to subacute ischemia  New small focus of gyral diffusion abnormality in the right frontal parietal region (4:20)  Persistent additional small foci of diffusion abnormality in the left periatrial and parieto-occipital region  No acute hemorrhage or midline shift  Stable moderate chronic microangiopathic changes within the brain  CTA stroke alert (head/neck)   Final  (04/26 1611)   Stable moderate (60 to 65%) stenosis in the bilateral internal carotid arteries  Stable severe stenosis distal right M1 segment  Stable severe stenosis proximal left M2  No new intracranial stenosis or large vessel occlusion  CT stroke alert brain   Final  (04/26 1556)      No acute intracranial abnormality  Chronic microangiopathy  Small evolving subacute infarct in the left parietal periventricular white matter  Other small recent infarcts better visualized on previous MRI              Results from last 7 days   Lab Units 04/27/23  0517 04/26/23  1540   WBC Thousand/uL 11 01* 12 38*   HEMOGLOBIN g/dL 15 7 15 9   HEMATOCRIT % 45 3 45 2   PLATELETS Thousands/uL 308 316         Results from last 7 days   Lab Units 04/27/23  0517 04/26/23  1540   SODIUM mmol/L 135 130*   POTASSIUM mmol/L 3 3* 3 7   CHLORIDE mmol/L 105 100   CO2 mmol/L 26 25   ANION GAP mmol/L 4 5   BUN mg/dL 29* 25   CREATININE mg/dL 0 99 1 15   EGFR ml/min/1 73sq m 80 66   CALCIUM mg/dL 9 1 8 9   MAGNESIUM mg/dL 2 4 2 4   PHOSPHORUS mg/dL 3 6 3 5         Results from last 7 days   Lab Units 04/27/23  0604 04/26/23 2233 04/26/23  1522   POC GLUCOSE mg/dl 108 100 125     Results from last 7 days   Lab Units 04/27/23  0517 04/26/23  1540   GLUCOSE RANDOM mg/dL 109 123         Results from last 7 days   Lab Units 04/27/23  0517   HEMOGLOBIN A1C % 5 8*   EAG mg/dl 120         Results from last 7 days   Lab Units 04/26/23 2004 04/26/23  1812 04/26/23  1540   HS TNI 0HR ng/L  --   --  13   HS TNI 2HR ng/L  --  14  --    HSTNI D2 ng/L  --  1  --    HS TNI 4HR ng/L 15  --   --    HSTNI D4 ng/L 2  --   --          Results from last 7 days   Lab Units 04/26/23  1540   PROTIME seconds 14 9*   INR  1 15   PTT seconds 34       ED Treatment:   Medication Administration from 04/26/2023 1508 to 04/26/2023 1741     None        Past Medical History:   Diagnosis Date   • Depression    • Diabetes mellitus ([facility] [address] )    • Dyslipidemia 03/26/2019   • Hyperlipidemia    • Hypertension    • Stroke [facility])      Present on Admission:  • Primary hypertension  • Type 2 diabetes mellitus, without long-term current use of insulin (HCC)  • Adjustment reaction with anxiety  • Chronic low back pain      Admitting Diagnosis:     Confusion [R41 0]  Stroke [facility]) [I63 9]  [place] (NIH) Stroke Scale level of consciousness score 0, alert; keenly responsive [Z78 9]    Age/Sex: 58 y o  male    Scheduled Medications:    amLODIPine, 10 mg, Oral, Daily  apixaban, 5 mg, Oral, BID  aspirin, 81 mg, Oral, Daily  atorvastatin, 40 mg, Oral, Daily  citalopram, 20 mg, Oral, Daily  docusate sodium, 100 mg, Oral, BID  donepezil, 5 mg, Oral, BID  hydrALAZINE, 25 mg, Oral, TID  insulin lispro, 1-6 Units, Subcutaneous, TID AC  insulin lispro, 1-6 Units, Subcutaneous, HS  losartan, 50 mg, Oral, Daily  metoprolol succinate, 100 mg, Oral, Daily  pantoprazole, 20 mg, Oral, Early Morning  tamsulosin, 0 4 mg, Oral, Daily With Dinner      Continuous IV Infusions:     PRN Meds:  acetaminophen, 650 mg, Oral, Q6H PRN  calcium carbonate, 1,000 mg, Oral, Daily PRN  ondansetron, 4 mg, Intravenous, Q6H PRN  senna, 2 tablet, Oral, Daily PRN        IP CONSULT TO NEUROLOGY  IP CONSULT TO NEUROLOGY    Network Utilization Review Department  ATTENTION: Please call with any questions or concerns to [phone] and carefully listen to the prompts so that you are directed to the right person  All voicemails are confidential   Hanane Julian all requests for admission clinical reviews, approved or denied determinations and any other requests to dedicated fax number below belonging to the campus where the patient is receiving treatment   List of dedicated fax numbers for the Facilities:  [address] DENIALS (Administrative/Medical Necessity) [phone]   [address] (Maternity/NICU/Pediatrics) [phone]   1 Mary Vargas [phone]   [address] [phone]   [address] [place] [address] Claribel [address] [phone]   [facility] Elpidio Tobar [facility] 134 815 [facility] [phone]

## 2023-04-27 NOTE — SPEECH THERAPY NOTE
"Speech Language/Pathology  CURRENT MEDICAL   a past medical history of depression, diabetes, hyperlipidemia, hypertension, and a chronic right MCA stroke presents with strokelike symptoms  Patient was a prehospital stroke alert secondary to the symptoms  Patient was in the hospital recently and discharged on  due to symptoms thought to be related to a chronic right MCA infarct  The infarct was first noted in  and he had a thrombectomy at that time  Patient apparently has left-sided residual weakness from his stroke  EMS stated that patient's wife said that he went unresponsive for a couple of minutes today  When EMS got there they said he was \"unresponsive\", but that he responded to pain  They state that patient woke up more on the ride over  They noted a left-sided facial droop  Patient has never had a left-sided facial droop  They also noticed some word finding difficulties, which she stated improved throughout the car ride  S/p thrombectomy in 2019, known severe R M1 stenosis, prior tobacco use, recent L MCA stroke (had aphasia) 2023      CURRENT COGNITIVE:  Alert and oriented     CURRENT PAIN & RESPONSE TO INTERVENTIONS:  No indication of pain     Pt was assessed using the Western Aphasia Battery revised Bedside with the following findings/results:     AUDITORY COMPREHENSION:  Object/Picture ID: 100%   Personal yes/no ?'s: 100%  Simple yes/no ? 's: 75%  Moderate y/n ?'s: 50%  One step commands: 25%   Complex or 3 step commands: 0%     READING COMPREHENSION/FLUENCY:  Paragraph comprehension: unable to assess- pt did not have reading glasses/ refused  Simple paragraph reading fluency: unable to assess- pt did not have reading glasses/ refused     VERBAL EXPRESSION/EXPR LANGUAGE:  Word/phrase repetition: 100%-up to 5 words  Unable to repeat complex phrases (the quick brown parkinson jumps over the lazy dog)   Confrontation Namin%- pt states it is more difficult for him but was able to " "identify all objects presented  Picture Description: few items described- just that the children were getting in trouble   Conversation: Spontaneous speech fluency judged to have some hesitations and word finding difficulty, functional conversationally  Ability to make needs known: functional      WRITTEN EXPRESSION:  Name: 25%- first name attempted, very unintelligible   Address: 10%- street number attempted, pt refused to attempt further   Sentence to dictation: not attempted  Sentence formulation: no attempted     MOTOR SPEECH:  Dysarthria:              Imprecise artic: no              Rate: WFL              Nasality: WFL              Breath support: WFL              Volume:WFL  Apraxia:              Oral: no              Verbal: no     Needs further motor speech evaluation: No     Cognitive -linguistic skills:       Symptoms noted:  Aware of deficits: yes- pt was frustrated w/ his difficulties and stated that this shouldn't be hard for him  Pt refused various tasks saying \"I can't\"     WAB Bedside: Anomic aphasia   Bedside Aphasia Score 72  Bedside Language Score - unable to score due to refusal of sections/ inability to complete     Summary/Impression:  Pt presents w/ difficulties in written expression, sequential demands and word finding  Pt shows frustration at current state and refused various tasks stating \"I can't\"  Some scores could not be obtained for this reason  Word finding appears to be challenging for the pt, but he was able to identify each object given a short wait period  This is a frustration of his, though  Sequential demands were attempted but incorrect/incomplete  Pt does not appear to be aware of deficits in this area  Writing was mostly illegible and the pt refused to complete more complex tasks  When asked to write the word \"the\", multiple attempts were made before refusal  Some letters are unrecognizable   Further assessment is likely warranted to gather more information, as this bedside " assessment could not be complete in entirety       Treatment Recommended and Frequency:    yes- as able and appropriate - 1-2 times x week   Impression and Recommendations reviewed with:   Pt, RN  Patient Stated Goal:    Pt states that he is frustrated with his word finding abilities     Education Initiated with:    Pt  Therapy Prognosis: fair  Prognosis considerations: medical history

## 2023-04-27 NOTE — ASSESSMENT & PLAN NOTE
· Reports chronic lower back pain for 4 weeks which has not improved despite multiple trails of steroids, nsaids and muscle relaxants- currently denies any back pain at this time   · XR of lumbar spine ordered by PCP: No lumbar vertebral body fracture or subluxation  Multilevel degenerative changes     · PRN tylenol

## 2023-04-27 NOTE — PLAN OF CARE
Problem: OCCUPATIONAL THERAPY ADULT  Goal: Performs self-care activities at highest level of function for planned discharge setting  See evaluation for individualized goals  Description: Treatment Interventions: ADL retraining, Functional transfer training, UE strengthening/ROM, Endurance training, Cognitive reorientation, Patient/family training, Equipment evaluation/education, Neuromuscular reeducation, Fine motor coordination activities, Compensatory technique education, Energy conservation, Activityengagement          See flowsheet documentation for full assessment, interventions and recommendations  Note: Limitation: Decreased ADL status, Decreased cognition, Decreased endurance, Decreased Safe judgement during ADL, Decreased fine motor control, Decreased self-care trans, Decreased high-level ADLs, Decreased sensation  Prognosis: Fair  Assessment: Pt is a 58 y o  male who was admitted to [facility] on 4/26/2023 with L facial droop, R sided weakness, and AMS and now here with  Stroke [facility]) New multiple strokes difficult to state whether cardioembolic give dilated L atrium as well as noted  Severe atherosclerosis in L and R MCA so cardioembolic vs atherembolic vs mixed picture   Pt's problem list also includes PMH of  has a past medical history of Depression, Diabetes mellitus ([facility] [address] ), Dyslipidemia (03/26/2019), Hyperlipidemia, Hypertension, and Stroke ([facility] [address] )    At baseline pt was completing I with ADL's/IALd's, no AD with functional mobiltiy, +drives  Pt lives with spouse in a [facility] with 1STE Currently pt requires min/mod a  for overall ADLS and min a without Ad for functional mobility/transfers  Pt currently presents with impairments in the following categories -steps to enter environment, difficulty performing ADLS, difficulty performing IADLS , limited insight into deficits and flat affect activity tolerance, endurance, standing balance/tolerance, FMC and GMC   These impairments, as well as pt's fatigue, (R) UE dysmetria, (L) UE dysmetria , decreased caregiver support and risk for falls  limit pt's ability to safely engage in all baseline areas of occupation, includingbathing, dressing, toileting, functional mobility/transfers, community mobility, driving, house maintenance, medication management, meal prep, cleaning, social participation  and leisure activities  The patient's raw score on the AM-PAC Daily Activity Inpatient Short Form is 19  A raw score of less than 19 suggests the patient may benefit from discharge to post-acute rehabilitation services  Please refer to the recommendation of the Occupational Therapist for safe discharge planning  From OT standpoint, recommend STR upon D/C  OT will continue to follow to address the below stated goals    Recommendation: Physiatry Consult  OT Discharge Recommendation: Post acute rehabilitation services

## 2023-04-27 NOTE — PROGRESS NOTES
NEUROLOGY RESIDENCY PROGRESS NOTE     Name: Darrion Blunt   Age & Sex: 58 y o  male   MRN: [id number]  [facility]   Encounter: [id number]    Darroin Blunt will need follow up in in 6 weeks with neurovascular attending  He will not require outpatient neurological testing, at this time  ASSESSMENT & PLAN     * Stroke [facility])  Assessment & Plan  58year old male with HTN, HLD, DM2, prior R MCA CVA s/p thrombectomy in March 2019, known severe R M1 stenosis, prior tobacco use, recent L MCA stroke (had aphasia) 04/16/2023 (was on plavix 75mg) came in as a Stroke alert on 4/26/2023  3:30 PM with initial NIHSS of 2 and LKW 2:30pm, initial Blood Pressure: 154/71  Initial presenting deficits were L facial droop, R sided weakness, and AMS   As a result of past stroke within the past month and on eliquis pt was determined to not be a candidate for thrombolysis (TNK)  Has had previous Loop recorder that was negative for any arrhythmias  Exam on 04/27/23: appears aphasic unable to give correct month and correct age and expresses frustration   Current Blood Pressure: (!) 177/84, BP over 24 hours: BP  Min: 110/54  Max: 177/84   Vascular risk factors: past L MCA and R MCA stroke, DM, HTN, HLD  Home meds: eliquis 5mg BID, ASA 81mg     Workup:  Lab Results   Component Value Date    HGBA1C 5 8 (H) 04/27/2023    CHOLESTEROL 140 04/17/2023    LDLCALC 72 04/17/2023    TRIG 176 (H) 04/17/2023    INR 1 15 04/26/2023      CTH: No acute intracranial abnormality  Chronic microangiopathy  Small evolving subacute infarct in the left parietal periventricular white matter  Other small recent infarcts better visualized on previous MRI  CTA: Stable moderate (60 to 65%) stenosis in the bilateral internal carotid arteries  Stable severe stenosis distal right M1 segment  Stable severe stenosis proximal left M2     MRI: Compared to the prior recent MRI examination dated 4/17/2023, interval increase in size of diffusion abnormality in the left parietal subcortical and periventricular white matter consistent with acute to subacute ischemia  New small focus of gyral diffusion abnormality in the right frontal parietal region (4:20)  Persistent additional small foci of diffusion abnormality in the left periatrial and parieto-occipital region  No acute hemorrhage or midline shift  Stable moderate chronic microangiopathic changes within the brain  Echocardiogram: pending   Telemetry: negative    Pertinent scores:  - NIHSS: 2  Stroke Modified Madison Score: 0 (No baseline symptoms/disability)    Impression: New multiple strokes difficult to state whether cardioembolic give dilated L atrium as well as noted  Severe atherosclerosis in L and R MCA so cardioembolic vs atherembolic vs mixed picture  Stroke w/u has been complete otherwise    Plan:  - Discussed plan with neurology attending, Dr Ana M Hurst  - BP: allow for normotension  - atorvastatin 40mg qhs  - Maintain glucose <180, SSI for coverage if indicated  - Medical management as per primary team appreciated  - Antiplatelet agents: switched BRILINTA 90mg BID   - given that patient was on plavix when he had his stroke on 04/16/2023  - Continue home AC eliquis 5mg BID  - low suspicion for seizure at this time and do not feel a EEG is warranted at this time  - continue w/ correction of any toxic/metabolic abnormalities  - TTE not needed  - DVT ppx and SCDs  - Monitor on telemetry  - PT/OT/Speech/PM&R input appreciated   - patient would likely benefit from speech therapy outpatient  - Stroke education  - rest of care as per primary team  - No other further recommendations from the inpatient Neurology perspective  - Please contact Neurology any further questions  SUBJECTIVE     Patient was seen and examined  No acute events overnight  He stated not the best sleep but is concerned as to why he is here and more concerned what is wrong with him   I explained that he had a stroke and that is likely possibly causing some of his speech difficulties given extent of the stroke  He denies any headaches, syncope, paresthesias, diplopia, visual changes, tinnitus, sudden onset weakness, garbled speech, dysarthria/slurring of words,  Loss of bowel or bladder      Review of Systems    OBJECTIVE     Patient ID: Brayden Carrillo is a 58 y o  male  Vitals:    23 0427 23 0601 23 0911 23 1206   BP: 149/74 162/74 (!) 177/84 (!) 158/129   Pulse: 75 70 87 81   Resp:       Temp:    98 2 °F (36 8 °C)   TempSrc:    Oral   SpO2: 95% 95% 95% 97%   Weight:          Temperature:   Temp (24hrs), Av °F (36 7 °C), Min:97 1 °F (36 2 °C), Max:98 7 °F (37 1 °C)    Temperature: 98 2 °F (36 8 °C)      Physical Exam     Neurologic Exam       Physical Exam  Vitals and nursing note reviewed  Constitutional:       General: He is not in acute distress  Appearance: He is well-developed  HENT:      Head: Normocephalic and atraumatic  Eyes:      Extraocular Movements: EOM normal       Conjunctiva/sclera: Conjunctivae normal       Pupils: Pupils are equal, round, and reactive to light  Cardiovascular:      Rate and Rhythm: Normal rate  Pulmonary:      Effort: Pulmonary effort is normal    Abdominal:      Palpations: Abdomen is soft  Tenderness: There is no abdominal tenderness  Musculoskeletal:         General: No swelling  Cervical back: Neck supple  Skin:     General: Skin is warm and dry  Capillary Refill: Capillary refill takes less than 2 seconds  Neurological:      Mental Status: He is alert and oriented to person, place, and time  Motor: Motor strength is normal       Coordination: Finger-Nose-Finger Test and Heel to [place] Test normal       Deep Tendon Reflexes:      Reflex Scores:       Tricep reflexes are 2+ on the right side and 2+ on the left side  Bicep reflexes are 2+ on the right side and 2+ on the left side         Brachioradialis reflexes are 2+ on the right side and 2+ on the left side  Patellar reflexes are 2+ on the right side and 2+ on the left side  Achilles reflexes are 2+ on the right side and 2+ on the left side  Psychiatric:         Mood and Affect: Mood normal          Speech: Speech normal          Neurologic Exam      Mental Status   Oriented to person, place,    Speech: speech is normal   Knowledge: good  Able to name object  Able to repeat  Some paraphasic errors    Was able to name clock but got time incorrect (said 9:10am instead of 10 to 9am)  Was unable to name his age or correct date   Seems to be confused    Had difficulty w/ 2-step commands   Could do simple math (2+2) but had trouble naming how many quarters in $1 75    Cranial Nerves      CN II   Visual fields full to confrontation       CN III, IV, VI   Pupils are equal, round, and reactive to light  Extraocular motions are normal       CN V   Facial sensation intact       CN VII   Facial expression full, symmetric       CN VIII   CN VIII normal       CN IX, X   CN IX normal    CN X normal       CN XI   CN XI normal       CN XII   CN XII normal       Motor Exam   Overall muscle tone: normal     Strength   Strength 5/5 throughout  Noted L arm orbiting      Sensory Exam   Light touch normal    Pinprick normal       Gait, Coordination, and Reflexes      Coordination   Finger to nose coordination: noted bilateral intention tremor L>R  Heel to shin coordination: normal     Reflexes   Right brachioradialis: 2+  Left brachioradialis: 2+  Right biceps: 2+  Left biceps: 2+  Right triceps: 2+  Left triceps: 2+  Right patellar: 2+  Left patellar: 2+  Right achilles: 2+  Left achilles: 2+  Right plantar: normal  Left plantar: normal  Right ankle clonus: absent  Left ankle clonus: absent         LABORATORY DATA     Labs: I have personally reviewed pertinent reports     and I have personally reviewed pertinent films in PACS  Results from last 7 days   Lab Units 04/27/23  1399 04/26/23  1540   WBC Thousand/uL 11 01* 12 38*   HEMOGLOBIN g/dL 15 7 15 9   HEMATOCRIT % 45 3 45 2   PLATELETS Thousands/uL 308 316      Results from last 7 days   Lab Units 04/27/23  0517 04/26/23  1540   SODIUM mmol/L 135 130*   POTASSIUM mmol/L 3 3* 3 7   CHLORIDE mmol/L 105 100   CO2 mmol/L 26 25   BUN mg/dL 29* 25   CREATININE mg/dL 0 99 1 15   CALCIUM mg/dL 9 1 8 9     Results from last 7 days   Lab Units 04/27/23  0517 04/26/23  1540   MAGNESIUM mg/dL 2 4 2 4     Results from last 7 days   Lab Units 04/27/23  0517 04/26/23  1540   PHOSPHORUS mg/dL 3 6 3 5      Results from last 7 days   Lab Units 04/26/23  1540   INR  1 15   PTT seconds 34               IMAGING & DIAGNOSTIC TESTING     Radiology Results: I have personally reviewed pertinent reports  and I have personally reviewed pertinent films in PACS    MRI brain wo contrast   Final Result by Martha Matthews MD (04/27 0439)      Compared to the prior recent MRI examination dated 4/17/2023, interval increase in size of diffusion abnormality in the left parietal subcortical and periventricular white matter consistent with acute to subacute ischemia  New small focus of gyral diffusion abnormality in the right frontal parietal region (4:20)  Persistent additional small foci of diffusion abnormality in the left periatrial and parieto-occipital region  No acute hemorrhage or midline shift  Stable moderate chronic microangiopathic changes within the brain  Study was marked in [facility] for immediate notification  Workstation performed: [id number]         CTA stroke alert (head/neck)   Final Result by E Marybelle Leventhal, MD (04/26 1611)   Stable moderate (60 to 65%) stenosis in the bilateral internal carotid arteries  Stable severe stenosis distal right M1 segment  Stable severe stenosis proximal left M2  No new intracranial stenosis or large vessel occlusion        Findings were directly discussed with Frantz Mcugire at 3:42 p m                   Workstation performed: [id number]         CT stroke alert brain   Final Result by SUSAN Aburto MD (04/26 6816)      No acute intracranial abnormality  Chronic microangiopathy  Small evolving subacute infarct in the left parietal periventricular white matter  Other small recent infarcts better visualized on previous MRI  Findings were directly discussed with Liz Shin at 3:42 p m  Workstation performed: [id number]         XR chest pa & lateral    (Results Pending)       Other Diagnostic Testing: I have personally reviewed pertinent reports     and I have personally reviewed pertinent films in PACS    ACTIVE MEDICATIONS     Current Facility-Administered Medications   Medication Dose Route Frequency   • acetaminophen (TYLENOL) tablet 650 mg  650 mg Oral Q6H PRN   • amLODIPine (NORVASC) tablet 10 mg  10 mg Oral Daily   • apixaban (ELIQUIS) tablet 5 mg  5 mg Oral BID   • atorvastatin (LIPITOR) tablet 40 mg  40 mg Oral Daily   • calcium carbonate (TUMS) chewable tablet 1,000 mg  1,000 mg Oral Daily PRN   • citalopram (CeleXA) tablet 20 mg  20 mg Oral Daily   • docusate sodium (COLACE) capsule 100 mg  100 mg Oral BID   • donepezil (ARICEPT) tablet 5 mg  5 mg Oral BID   • hydrALAZINE (APRESOLINE) tablet 25 mg  25 mg Oral TID   • insulin lispro (HumaLOG) 100 units/mL subcutaneous injection 1-6 Units  1-6 Units Subcutaneous TID AC   • insulin lispro (HumaLOG) 100 units/mL subcutaneous injection 1-6 Units  1-6 Units Subcutaneous HS   • losartan (COZAAR) tablet 50 mg  50 mg Oral Daily   • metoprolol succinate (TOPROL-XL) 24 hr tablet 100 mg  100 mg Oral Daily   • nicotine (NICODERM CQ) 14 mg/24hr TD 24 hr patch 14 mg  14 mg Transdermal Daily   • ondansetron (ZOFRAN) injection 4 mg  4 mg Intravenous Q6H PRN   • pantoprazole (PROTONIX) EC tablet 20 mg  20 mg Oral Early Morning   • senna (SENOKOT) tablet 17 2 mg  2 tablet Oral Daily PRN   • tamsulosin (FLOMAX) capsule 0 4 mg  0 4 mg Oral Daily With Dinner   • ticagrelor (BRILINTA) tablet 90 mg  90 mg Oral Q12H [address]       Prior to Admission medications    Medication Sig Start Date End Date Taking?  Authorizing Provider   amLODIPine (NORVASC) 10 mg tablet Take 10 mg by mouth daily   Yes Historical Provider, MD   apixaban (Eliquis) 5 mg Take 1 tablet (5 mg total) by mouth 2 (two) times a day 4/18/23  Yes Aminah WHYTE PA-C   aspirin (ECOTRIN LOW STRENGTH) 81 mg EC tablet Take 1 tablet (81 mg total) by mouth daily Do not start before April 19, 2023 4/19/23  Yes Aminah WHYTE PA-C   atorvastatin (LIPITOR) 40 mg tablet Take 40 mg by mouth daily with breakfast   Yes Historical Provider, MD   citalopram (CeleXA) 20 mg tablet Take 20 mg by mouth daily   Yes Historical Provider, MD   donepezil (ARICEPT) 5 mg tablet TAKE 1 TABLET TWICE A DAY 2/7/23  Yes Natacha Blanca MD   hydrALAZINE (APRESOLINE) 25 mg tablet Take 25 mg by mouth 3 (three) times a day   Yes Historical Provider, MD   hydrochlorothiazide (HYDRODIURIL) 25 mg tablet Take 25 mg by mouth daily   Yes Historical Provider, MD   lidocaine (Lidoderm) 5 % Apply 1 patch topically over 12 hours daily Remove & Discard patch within 12 hours or as directed by MD 4/26/23  Yes Emerita Chava, DO   losartan (COZAAR) 50 mg tablet Take 50 mg by mouth daily   Yes Historical Provider, MD   metFORMIN (GLUCOPHAGE) 500 mg tablet Take 1 tablet (500 mg total) by mouth daily with breakfast 4/6/19  Yes Emilia Worley MD   metoprolol succinate (TOPROL-XL) 100 mg 24 hr tablet Take 100 mg by mouth daily   Yes Historical Provider, MD   omeprazole (PriLOSEC OTC) 20 MG tablet Take 20 mg by mouth daily   Yes Historical Provider, MD   sildenafil (REVATIO) 20 mg tablet TAKE 2 3 TABLETS BY MOUTH AS NEEDED FOR SEXUAL ACTIVITY 7/12/19  Yes Historical Provider, MD   tamsulosin (FLOMAX) 0 4 mg Take 0 4 mg by mouth daily with breakfast   Yes Historical Provider, MD         VTE Pharmacologic Prophylaxis: as per primary  VTE Mechanical Prophylaxis: sequential compression device    ==  Jan Myers 27  Neurology Residency, PGY-2

## 2023-04-27 NOTE — OCCUPATIONAL THERAPY NOTE
Occupational Therapy Evaluation     Patient Name: Laney Mayen  HNEVJ'X Date: 4/27/2023  Problem List  Principal Problem:    Stroke [facility])  Active Problems:    Primary hypertension    Nicotine dependence    Adjustment reaction with anxiety    Chronic ischemic right MCA stroke    Chronic low back pain    Hyponatremia    Prediabetes    Hypokalemia    Leukocytosis    Past Medical History  Past Medical History:   Diagnosis Date    Depression     Diabetes mellitus ([address] )     Dyslipidemia 03/26/2019    Hyperlipidemia     Hypertension     Stroke [facility])      Past Surgical History  Past Surgical History:   Procedure Laterality Date    BACK SURGERY      IR STROKE ALERT  3/19/2019    SHOULDER SURGERY               04/27/23 0950   OT Last Visit   OT Visit Date 04/27/23   Note Type   Note type Evaluation   Pain Assessment   Pain Assessment Tool 0-10   Pain Score No Pain   Restrictions/Precautions   Weight Bearing Precautions Per Order No   Other Precautions Chair Alarm;Cognitive; Bed Alarm;Telemetry; Fall Risk;Visual impairment   +expressive aphasia   Home Living   Type of Home House   Home Layout Two level   Bathroom Shower/Tub Tub/shower unit   Bathroom Toilet Raised   Bathroom Equipment Grab bars in shower; Shower chair   [address] (No AD)   Prior Function   Level of [place] Independent with ADLs   Lives With Spouse   Receives Help From Family  (spouse, son)   IADLs Independent with driving; Independent with meal prep; Independent with medication management   Falls in the last 6 months 0   Vocational Retired   Lifestyle   Autonomy I with ADL's/shares homemaking with spouse, no AD with functional mobilty, +drives   Reciprocal Relationships spouse, son   Service to Others retired   Intrinsic Gratification riding [address] Present No   ADL   Eating Assistance 4  [facility] "Minimal Assistance   LB Bathing Assistance 3  Moderate Assistance   UB Dressing Assistance 4  Minimal Assistance   LB Dressing Assistance 3  Moderate Assistance   LB Dressing Deficit Don/doff R sock; Don/doff L sock   Transfers   Sit to Stand 4  Minimal assistance   Additional items Assist x 1   Stand to Sit 4  Minimal assistance   Additional items Assist x 1   Additional Comments +min a for CG and no AD   Functional Mobility   Functional Mobility 4  Minimal assistance   Additional Comments min a without AD  short distance functional with one cue to negotiate environmental obstacle on right , requiring increased time and unable to divide attention  Additional items   (NO AD)   Balance   Static Sitting Fair +   Dynamic Sitting Fair   Static Standing Fair -   Dynamic Standing Poor +   Ambulatory Poor +   Activity Tolerance   Activity Tolerance Patient limited by fatigue   Medical Staff Made Aware PT jennifer   Nurse Made Aware RN cleared pt for therapy   RUE Assessment   RUE Assessment WFL   LUE Assessment   LUE Assessment WFL   Hand Function   Gross Motor Coordination Impaired  (left >right +dysmetria)   Fine Motor Coordination Impaired  (left >right with finger to thumb sequencing, pt reports increased need for focus to perform left hand [facility] task)   Vision-Basic Assessment   Current Vision Wears glasses only for reading   Patient Visual Report (Difficulty reading news paper title -large print)   Vision - Complex Assessment   Ocular Range of Motion Intact   Tracking Impaired  (difficulty with left maintaining end point)   Convergence (broke at [facility] -decreased smoothness/consistency pt report difficulty reading for an increased amount of time )   Cognition   Overall Cognitive Status (S)  Impaired  (pt reports \"feeling foggy still\")   Arousal/Participation Alert; Responsive; Cooperative   Attention Attends with cues to redirect   Orientation Level Oriented to person;Oriented to time;Oriented to place  (choice cues for " place)   Memory Decreased recall of precautions;Decreased recall of recent events   Following Commands Follows one step commands with increased time or repetition   Comments pt presents with flat affect, word finding difficulties/expressive aphasia, slow processing, followed one step commands, difficulty with >2-3 functional directives  Assessment   Limitation Decreased ADL status; Decreased cognition;Decreased endurance;Decreased Safe judgement during ADL;Decreased fine motor control;Decreased self-care trans;Decreased high-level ADLs; Decreased sensation   Prognosis Fair   Assessment Pt is a 58 y o  male who was admitted to [facility] on 4/26/2023 with L facial droop, R sided weakness, and AMS and now here with  Stroke [facility]) New multiple strokes difficult to state whether cardioembolic give dilated L atrium as well as noted  Severe atherosclerosis in L and R MCA so cardioembolic vs atherembolic vs mixed picture   Pt's problem list also includes PMH of  has a past medical history of Depression, Diabetes mellitus ([facility] [address] ), Dyslipidemia (03/26/2019), Hyperlipidemia, Hypertension, and Stroke ([facility] )    At baseline pt was completing I with ADL's/IALd's, no AD with functional mobiltiy, +drives  Pt lives with spouse in a [facility] with 1STE Currently pt requires min/mod a  for overall ADLS and min a without Ad for functional mobility/transfers  Pt currently presents with impairments in the following categories -steps to enter environment, difficulty performing ADLS, difficulty performing IADLS , limited insight into deficits and flat affect activity tolerance, endurance, standing balance/tolerance, FMC and GMC   These impairments, as well as pt's fatigue, (R) UE dysmetria, (L) UE dysmetria , decreased caregiver support and risk for falls  limit pt's ability to safely engage in all baseline areas of occupation, includingbathing, dressing, toileting, functional mobility/transfers, community mobility, driving, house maintenance, medication management, meal prep, cleaning, social participation  and leisure activities  The patient's raw score on the AM-PAC Daily Activity Inpatient Short Form is 19  A raw score of less than 19 suggests the patient may benefit from discharge to post-acute rehabilitation services  Please refer to the recommendation of the Occupational Therapist for safe discharge planning  From OT standpoint, recommend STR upon D/C  OT will continue to follow to address the below stated goals  Goals   Patient Goals go home   LTG Time Frame 10-14   Long Term Goal #1 see goals below   Plan   Treatment Interventions ADL retraining;Functional transfer training;UE strengthening/ROM; Endurance training;Cognitive reorientation;Patient/family training;Equipment evaluation/education; Neuromuscular reeducation; Fine motor coordination activities; Compensatory technique education; Energy conservation; Activityengagement   Goal Expiration Date 05/11/23   OT Frequency 3-5x/wk   Recommendation   Recommendation Physiatry Consult   OT Discharge Recommendation Post acute rehabilitation services   AM-formerly [facility] Daily Activity Inpatient   Lower Body Dressing 2   Bathing 3   Toileting 3   Upper Body Dressing 3   Grooming 4   Eating 4   Daily Activity Raw Score 19   Daily Activity Standardized Score (Calc for Raw Score >=11) 40 22   AM-PAC Applied Cognition Inpatient   Following a Speech/Presentation 2   Understanding Ordinary Conversation 4   Taking Medications 3   Remembering Where Things Are Placed or Put Away 3   Remembering List of 4-5 Errands 3   Taking Care of Complicated Tasks 2   Applied Cognition Raw Score 17   Applied Cognition Standardized Score 36 52   End of Consult   Patient Position at End of Consult Bedside chair;Bed/Chair alarm activated; All needs within reach   Nurse Communication Nurse aware of consult    Occupational Therapy Goals:    *Mod I with bed mobility to engage in functional tasks    *Mod I Adl's after setup with use of AE PRN  *Mod I toileting and clothing management   *Mod I functional mobility and transfers to/from all surfaces with Fair + dynamic balance and safety for participation in dynamic adls and iadl tasks   *Demonstrate good carryover with safe use of RW during functional tasks   *Assess DME needs   *Increase activity tolerance to 25-30 minutes for participation in adls and enjoyable activities  *Pt to participate in further cognitive testing with good attention and participation to assist with safe d/c recommendations  *Demonstrate good carryover of pt/family education and training with good tolerance for increased safety and independence with ADL's/ADl's  *Pt will improve standing balance to 4-5 minutes with functional tasks to increase I with toileting/transfers  *Patient will demonstrate 100% carryover of energy conservation techniques t/o functional I/ADL/leisure tasks w/o cues s/p skilled education to increase endurance during functional tasks  *Pt will follow 100% simple one step verbal commands and be A/Ox4 consistently t/o use of external environmental cues w/ mod I  *Pt will participate in fine/gross motor coordination/strenthening/dexterity exercises to Good in order to increase participation in functional activities       Mercy Gunning MOT, OTR/L

## 2023-04-27 NOTE — ASSESSMENT & PLAN NOTE
· Noted on admission with wbc 12 38--> 11 01  · Check chest xray   · Check procal level   · Trend wbcs   · Check covid

## 2023-04-27 NOTE — PROGRESS NOTES
Pastoral Care Progress Note    2023  Patient: Helio Villalba : 1960  Admission Date & Time: 2023 1530  MRN: [id number] CSN: [id number]        Follow up & confirmed Pt wife Carey John came to [facility] to be with husb             Chaplaincy Interventions Utilized:   Empowerment: Encouraged focus on present and Encouraged self-care    Exploration: Explored hope and Explored spiritual needs & resources    Collaboration: Encouraged adherence to treatment plan    Relationship Building: Cultivated a relationship of care and support, Listened empathically, and Provided silent and supportive presence    Ritual: Provided prayer    Chaplaincy Outcomes Achieved:  Distress reduced and Expressed peace    Spiritual Coping Strategies Utilized:   Spiritual comfort

## 2023-04-27 NOTE — RESTORATIVE TECHNICIAN NOTE
Restorative Technician Note      Patient Name: Kayla Bernard     Note Type: Mobility  Patient Position Upon Consult: Supine  Activity Performed: Ambulated; Dangled; Stood  Assistive Device: Other (Comment) (none)  Education Provided: Yes  Patient Position at End of Consult:  Other (comment) (PT Cheng dubois assisted pt, see PT Notes post ambulation )    Shimon REYNA, Restorative Technician, [employer]

## 2023-04-27 NOTE — ASSESSMENT & PLAN NOTE
Lab Results   Component Value Date    HGBA1C 5 8 (H) 04/27/2023       Recent Labs     04/26/23  1522 04/26/23  2233 04/27/23  0604   POCGLU 125 100 108       Blood Sugar Average: Last 72 hrs:  (P) 111   · A1c 5 8  · Hold metformin while inpatient  · Diabetic diet  · Fingerstick QID with sliding scale coverage

## 2023-04-27 NOTE — ASSESSMENT & PLAN NOTE
Pt presented to the ER as a stroke alert with history of known severe R M1 stenosis, prior tobacco use, recent L MCA stroke (had aphasia) 04/16/2023 came in as a Stroke alert on 4/26/2023  NIHSS of 2  Initial presenting deficits were L facial droop, R sided weakness, and AMS   As a result of past stroke within the past month and on eliquis pt was determined to not be a candidate for thrombolysis   · 4/26/2023 CTA of the head and neck-Stable moderate (60 to 65%) stenosis in the bilateral internal carotid arteries  Stable severe stenosis distal right M1 segment  Stable severe stenosis proximal left M2  No new intracranial stenosis or large vessel occlusion  · MRI brain 4/27- Compared to the prior recent MRI examination dated 4/17/2023, interval increase in size of diffusion abnormality in the left parietal subcortical and periventricular white matter consistent with acute to subacute ischemia  New small focus of gyral diffusion abnormality in the right frontal parietal region  Persistent additional small foci of diffusion abnormality in the left periatrial and parieto-occipital region  No acute hemorrhage or midline shift  Stable moderate chronic microangiopathic changes within the brain  · Stroke pathway  · Allow for permissive hypertension  · A1c 5 8 lipid panel  · Continue with home aspirin 81 mg- pending aspirin platelet inhibitor lab  · Continue with Eliquis 5 mg twice daily  · No need for repeat echocardiogram at this time as echo was recently completed on previous admission 4/18/2023  · Echo-EF 55%  Systolic function is normal  Wall motion is normal   Grade 1 diastolic dysfunction left and right atrium mildly dilated  Mild aortic valve stenosis  Mild mitral and tricuspid valve regurgitation  · Pending EEG  · Monitor on telemetry for arrythmia    · Neurology following  · PT/OT/speech consults pending

## 2023-04-27 NOTE — UTILIZATION REVIEW
NOTIFICATION OF INPATIENT ADMISSION   AUTHORIZATION REQUEST   SERVICING FACILITY:   [facility]  Address: [address] [place] [address], [address] [place] [address]  Tax ID: [id number]  NPI: [id number] ATTENDING PROVIDER:  Attending Name and NPI#: James [id number] [[id number]]  Address: [address] [place] [address], [address] [place] [id number]  Phone: [phone]   ADMISSION INFORMATION:  Place of Service: Inpatient [address] [facility]  [address]  Place of Service Code: 21  Inpatient Admission Date/Time: 4/26/23  4:28 PM  Discharge Date/Time: No discharge date for patient encounter  Admitting Diagnosis Code/Description:  Confusion [R41 0]  Stroke [facility]) [I63 9]  [place] (NIH) Stroke Scale level of consciousness score 0, alert; keenly responsive [Z78 9]     UTILIZATION REVIEW CONTACT:  Manolo Mcgarry Utilization   Network Utilization Review Department  Phone: [phone]  Fax: [phone]  Email: [email or web address]  Contact for approvals/pending authorizations, clinical reviews, and discharge  PHYSICIAN ADVISORY SERVICES:  Medical Necessity Denial & [facility] Review  Phone: [phone]  Fax: [phone]  Email: [email or web address]

## 2023-04-28 LAB
ANION GAP SERPL CALCULATED.3IONS-SCNC: 4 MMOL/L (ref 4–13)
BUN SERPL-MCNC: 24 MG/DL (ref 5–25)
CALCIUM SERPL-MCNC: 9.7 MG/DL (ref 8.3–10.1)
CHLORIDE SERPL-SCNC: 101 MMOL/L (ref 96–108)
CO2 SERPL-SCNC: 28 MMOL/L (ref 21–32)
CREAT SERPL-MCNC: 1.08 MG/DL (ref 0.6–1.3)
ERYTHROCYTE [DISTWIDTH] IN BLOOD BY AUTOMATED COUNT: 13.2 % (ref 11.6–15.1)
GFR SERPL CREATININE-BSD FRML MDRD: 72 ML/MIN/1.73SQ M
GLUCOSE SERPL-MCNC: 101 MG/DL (ref 65–140)
GLUCOSE SERPL-MCNC: 105 MG/DL (ref 65–140)
GLUCOSE SERPL-MCNC: 108 MG/DL (ref 65–140)
GLUCOSE SERPL-MCNC: 150 MG/DL (ref 65–140)
GLUCOSE SERPL-MCNC: 80 MG/DL (ref 65–140)
GLUCOSE SERPL-MCNC: 99 MG/DL (ref 65–140)
HCT VFR BLD AUTO: 46.2 % (ref 36.5–49.3)
HGB BLD-MCNC: 15.9 G/DL (ref 12–17)
MCH RBC QN AUTO: 32.6 PG (ref 26.8–34.3)
MCHC RBC AUTO-ENTMCNC: 34.4 G/DL (ref 31.4–37.4)
MCV RBC AUTO: 95 FL (ref 82–98)
PLATELET # BLD AUTO: 294 THOUSANDS/UL (ref 149–390)
PMV BLD AUTO: 10.5 FL (ref 8.9–12.7)
POTASSIUM SERPL-SCNC: 3.9 MMOL/L (ref 3.5–5.3)
PROCALCITONIN SERPL-MCNC: <0.05 NG/ML
RBC # BLD AUTO: 4.88 MILLION/UL (ref 3.88–5.62)
SODIUM SERPL-SCNC: 133 MMOL/L (ref 135–147)
WBC # BLD AUTO: 11.59 THOUSAND/UL (ref 4.31–10.16)

## 2023-04-28 RX ADMIN — DONEPEZIL HYDROCHLORIDE 5 MG: 5 TABLET ORAL at 09:09

## 2023-04-28 RX ADMIN — APIXABAN 5 MG: 5 TABLET, FILM COATED ORAL at 09:09

## 2023-04-28 RX ADMIN — AMLODIPINE BESYLATE 10 MG: 10 TABLET ORAL at 09:09

## 2023-04-28 RX ADMIN — TAMSULOSIN HYDROCHLORIDE 0.4 MG: 0.4 CAPSULE ORAL at 17:03

## 2023-04-28 RX ADMIN — APIXABAN 5 MG: 5 TABLET, FILM COATED ORAL at 17:03

## 2023-04-28 RX ADMIN — ATORVASTATIN CALCIUM 40 MG: 40 TABLET, FILM COATED ORAL at 09:09

## 2023-04-28 RX ADMIN — HYDRALAZINE HYDROCHLORIDE 25 MG: 25 TABLET, FILM COATED ORAL at 17:03

## 2023-04-28 RX ADMIN — DONEPEZIL HYDROCHLORIDE 5 MG: 5 TABLET ORAL at 17:03

## 2023-04-28 RX ADMIN — CITALOPRAM HYDROBROMIDE 20 MG: 20 TABLET ORAL at 09:09

## 2023-04-28 RX ADMIN — TICAGRELOR 90 MG: 90 TABLET ORAL at 09:09

## 2023-04-28 RX ADMIN — TICAGRELOR 90 MG: 90 TABLET ORAL at 20:30

## 2023-04-28 RX ADMIN — HYDROCHLOROTHIAZIDE 25 MG: 25 TABLET ORAL at 09:09

## 2023-04-28 RX ADMIN — DOCUSATE SODIUM 100 MG: 100 CAPSULE, LIQUID FILLED ORAL at 17:03

## 2023-04-28 RX ADMIN — HYDRALAZINE HYDROCHLORIDE 25 MG: 25 TABLET, FILM COATED ORAL at 09:09

## 2023-04-28 RX ADMIN — HYDRALAZINE HYDROCHLORIDE 25 MG: 25 TABLET, FILM COATED ORAL at 20:30

## 2023-04-28 RX ADMIN — DOCUSATE SODIUM 100 MG: 100 CAPSULE, LIQUID FILLED ORAL at 09:09

## 2023-04-28 RX ADMIN — PANTOPRAZOLE SODIUM 20 MG: 20 TABLET, DELAYED RELEASE ORAL at 05:23

## 2023-04-28 RX ADMIN — METOPROLOL SUCCINATE 100 MG: 100 TABLET, EXTENDED RELEASE ORAL at 09:09

## 2023-04-28 RX ADMIN — LOSARTAN POTASSIUM 50 MG: 50 TABLET, FILM COATED ORAL at 09:09

## 2023-04-28 NOTE — PROGRESS NOTES
PHYSICAL MEDICINE AND REHABILITATION   PREADMISSION ASSESSMENT     Projected [facility] Diagnoses:  Impairment of mobility, safety, Activities of Daily Living (ADLs), and cognitive/communication skills due to Stroke:  01 4  No paresis    Etiologic: Left Parietal Stroke, B/L Carotid Artery Stenosis    Date of Onset: 4/26/2023       Date of surgery: 5/4/23 - Right Common Carotid Arteriogram, Left Common Carotid Arteriogram, Left ICA angioplasty and stenting with distal protection device, Intraprocedural and postprocedural arteriography, Limited Right Femoral Arteriogram    PATIENT INFORMATION  Name: Michele Oliver Phone #: [phone] (home) [phone] (work)  Address: [address] [place] [address]  YOB: 1960 Age: 58 y o  SS#   Marital Status: /Civil Union  Ethnicity:   Employment Status: retired  Extended Emergency Contact Information  Primary Emergency Contact: [address] Phone: [phone]  Mobile Phone: [phone]  Relation: Spouse  Advance Directive: Level 1 - Full Code, No Advanced Directives on File    INSURANCE/COVERAGE:     Primary Payor: [employer] / Plan: [employer] MEDICARE ADVANTAGE [address] [facility] REP / Product Type: Medicare PPO /   Secondary PayerDixon Nilo  ID # - [id number]   Payer Contact: Jesús Block Contact: unknown   Contact Phone: [phone]  (f) [phone] Contact Phone: [phone]     Authorization #: [id number]  Coverage Dates: 5/12/23 - 5/19/23  LCD: 5/19/23 - Submit next review via fax    Medical Record #: [id number]    REFERRAL SOURCE:   Referring provider: Miracle Joshi, *  Referring facility: [address] [place] [address]  Room: [address] [place] [address] [address] 7[address]/[facility] 733-01  PCP: Eyal Diehl PCP phone number: [phone]    MEDICAL INFORMATION  HPI: Radha Johnson is a 59 yo male who presented to [facility] ED on 4/26/23 due to wife witnessing altered mental status, confusion with abnormal speech and behavior similar to a previous CVA symptoms from Left MCA CVA on 4/16/23  Patient with history of previous R MCA CVA s/p thrombectomy in March 2019 without clear etiology despite years of loop recorder, recent L MCA stroke 4/16/23 (empirically AC eliquis 5mg BID w/ ASA 81mg  due to concern for ESUS)  He presents 4/26/23 with L facial droop, R sided weakness, and AMS  Patient did not receive TNK given recent stroke and on Eliquis  Loop recorder negative for arrhythmias  While here, he was found to have new multifocal strokes  IVY 5/2 shows EF 60% with no PFO  MRI 5/2 showed left parietal stroke that appears larger  He underwent angiogram to assess for vasculitis, and he is now s/p left ICA stent 5/4 without signs of vasculitis  Patient was initiated on brilinta on 4/27    MRI completed on 4/26/23  Imaging compared to the prior recent MRI (4/17/23) showed interval increase in size of diffusion abnormality in the left parietal subcortical and periventricular white matter consistent with acute to subacute ischemia  New small focus of gyral diffusion abnormality in the right frontal parietal region  Persistent additional small foci of diffusion abnormality in the left periatrial and parieto-occipital region  No acute hemorrhage or midline shift  Stable moderate chronic microangiopathic changes within the brain  Repeat MRI 5/2 showed left parietal stroke that appeared larger  Abdominal imaging revealed patient has a 3/4 fusiform infrarenal abdominal aortic aneurysm and is to follow up in the outpatient setting  Patient also c/o low back pain  This is chronic  MRI of the spine revealed multilevel spondylosis and is controlled with multimodal analgesics  He has been deemed hemodynamically stable at this time  PT/OT therapies were consulted and continue to follow patient at this time and are recommending inpatient acute rehab when medically stable   All involved medical disciplines feel/agree patient is medically ready for discharge at this time  Inpatient acute rehabilitation physician was consulted  Upon review of patient’s case and correspondence with PT/OT therapy services, [address] [place] [address] feels he will benefit and is a good candidate and appropriate for inpatient acute rehab at this time  He has demonstrated the willingness, desire and tolerance to participate in the required 3 hours or more of therapies per day  COVID Vaccination Status:  Jake Chatman - 3/26/21, 4/19/21  COVID Testing Completed:  5/11/23 - NEGATIVE    Past Medical History:   Past Surgical History:    Allergies:     Past Medical History:   Diagnosis Date   • Depression    • Diabetes mellitus ([facility] [address] )    • Dyslipidemia 03/26/2019   • Hyperlipidemia    • Hypertension    • Stroke [facility])     Past Surgical History:   Procedure Laterality Date   • BACK SURGERY     • IR CEREBRAL ANGIOGRAPHY  5/4/2023   • IR STROKE ALERT  3/19/2019   • SHOULDER SURGERY       Allergies   Allergen Reactions   • Flexeril [Cyclobenzaprine] Drowsiness   • Lisinopril Cough   • Nsaids Confusion         Medical/functional conditions requiring inpatient rehabilitation: Impaired balance, ambulation and mobility and ADL self-care    Risk for medical/clinical complications: Risk for Falls, uncontrolled pain, DVT/PE, skin breakdown, infection, hypo/hypertension    Comorbidities:  Leukocytosis, Hypokalemia, Prediabetes, Hyponatremia, Chronic low back pain, Chronic Ischemic Right MCA Stroke, Anxiety, Nicotine dependence, Hypertension    CURRENT VITAL SIGNS:   Temp:  [98 °F (36 7 °C)-98 4 °F (36 9 °C)] 98 4 °F (36 9 °C)  HR:  [60-77] 60  Resp:  [16-18] 16  BP: ()/(53-65) 131/58   Intake/Output Summary (Last 24 hours) at 5/12/2023 1322  Last data filed at 5/12/2023 0601  Gross per 24 hour   Intake 240 ml   Output 850 ml   Net -610 ml        LABORATORY RESULTS:      Lab Results   Component Value Date    HGB 12 1 05/06/2023    HCT 35 7 (L) 05/06/2023 WBC 13 88 (H) 05/06/2023     Lab Results   Component Value Date    BUN 25 05/06/2023    K 3 7 05/06/2023     05/06/2023    GLUCOSE 117 04/16/2019    CREATININE 0 77 05/06/2023     Lab Results   Component Value Date    PROTIME 13 7 05/05/2023    INR 1 03 05/05/2023        DIAGNOSTIC STUDIES:  XR chest pa & lateral    Result Date: 4/28/2023  Impression: No acute cardiopulmonary disease  Workstation performed: [id number]     X-ray lumbar spine complete 4+ views    Result Date: 4/26/2023  Impression: No lumbar vertebral body fracture or subluxation  Multilevel degenerative changes  Workstation performed: [id number]     MRI brain wo contrast    Result Date: 4/27/2023  Impression: Compared to the prior recent MRI examination dated 4/17/2023, interval increase in size of diffusion abnormality in the left parietal subcortical and periventricular white matter consistent with acute to subacute ischemia  New small focus of gyral diffusion abnormality in the right frontal parietal region (4:20)  Persistent additional small foci of diffusion abnormality in the left periatrial and parieto-occipital region  No acute hemorrhage or midline shift  Stable moderate chronic microangiopathic changes within the brain  Study was marked in [facility] for immediate notification  Workstation performed: [id number]     CT stroke alert brain    Result Date: 4/26/2023  Impression: No acute intracranial abnormality  Chronic microangiopathy  Small evolving subacute infarct in the left parietal periventricular white matter  Other small recent infarcts better visualized on previous MRI  Findings were directly discussed with Navin Dalal at 3:42 p m  Workstation performed: [id number]     CTA stroke alert (head/neck)    Result Date: 4/26/2023  Impression: Stable moderate (60 to 65%) stenosis in the bilateral internal carotid arteries  Stable severe stenosis distal right M1 segment  Stable severe stenosis proximal left M2   No new intracranial stenosis or large vessel occlusion  Findings were directly discussed with Breanna Dang at 3:42 p m   Workstation performed: [id number]       PRECAUTIONS/SPECIAL NEEDS:  Tobacco:   Social History     Tobacco Use   Smoking Status Former   Smokeless Tobacco Never   , Alcohol:    Social History     Substance and Sexual Activity   Alcohol Use Not Currently    Anticoagulation:  Heparin and Aspirin 81mg, Edema Management, Safety Concerns, Dietary Restrictions: Cardiac and Language Preference: English, Urinary Retention Protocol    MEDICATIONS:     Current Facility-Administered Medications:   •  acetaminophen (TYLENOL) tablet 975 mg, 975 mg, Oral, Q8H [facility], Lily Delcid, DO, 975 mg at 05/12/23 0606  •  aspirin (ECOTRIN LOW STRENGTH) EC tablet 81 mg, 81 mg, Oral, Daily, Lily Delcid DO, 81 mg at 05/12/23 7038  •  atorvastatin (LIPITOR) tablet 40 mg, 40 mg, Oral, Daily, Lily Delcid DO, 40 mg at 05/12/23 5357  •  bisacodyl (DULCOLAX) rectal suppository 10 mg, 10 mg, Rectal, Daily PRN, David Haff, DO, 10 mg at 05/07/23 0844  •  calcium carbonate (TUMS) chewable tablet 1,000 mg, 1,000 mg, Oral, Daily PRN, Lily Delcid DO  •  citalopram (CeleXA) tablet 20 mg, 20 mg, Oral, Daily, Lily Delcid DO, 20 mg at 05/12/23 0953  •  donepezil (ARICEPT) tablet 5 mg, 5 mg, Oral, BID, Lily Delcid DO, 5 mg at 05/12/23 4687  •  heparin (porcine) subcutaneous injection 5,000 Units, 5,000 Units, Subcutaneous, Q8H [facility], 5,000 Units at 05/12/23 0606 **AND** [COMPLETED] Platelet count, , , Once, Tima Hudson MD  •  hydrochlorothiazide (HYDRODIURIL) tablet 12 5 mg, 12 5 mg, Oral, Daily, Maddison Beltre MD, 12 5 mg at 05/12/23 9807  •  HYDROcodone-acetaminophen (NORCO) 5-325 mg per tablet 1 tablet, 1 tablet, Oral, Q6H PRN, Lily Delcid DO, 1 tablet at 05/11/23 1457  •  lidocaine (LIDODERM) 5 % patch 1 patch, 1 patch, Topical, Daily, Lily Delcid DO, 1 patch at 05/12/23 0916  •  losartan (COZAAR) tablet 50 mg, 50 mg, Oral, Daily, Lily Delcid, DO, 50 mg at 05/12/23 0527  •  methocarbamol (ROBAXIN) tablet 500 mg, 500 mg, Oral, Q6H PRN, Carmen Triana, DO, 500 mg at 05/11/23 1734  •  metoprolol succinate (TOPROL-XL) 24 hr tablet 100 mg, 100 mg, Oral, Daily, Lily Delcid, DO, 100 mg at 05/12/23 4540  •  ondansetron (ZOFRAN) injection 4 mg, 4 mg, Intravenous, Q6H PRN, Lily Julienatt, DO  •  oxyCODONE (ROXICODONE) immediate release tablet 10 mg, 10 mg, Oral, Q6H PRN, Tom Yen MD, 10 mg at 05/11/23 1403  •  oxyCODONE (ROXICODONE) IR tablet 5 mg, 5 mg, Oral, Q6H PRN, Tom Yen MD  •  pantoprazole (PROTONIX) EC tablet 20 mg, 20 mg, Oral, Early Morning, Lily Delcid, DO, 20 mg at 05/12/23 0606  •  polyethylene glycol (MIRALAX) packet 17 g, 17 g, Oral, QAM, Lily Delcid, DO, 17 g at 05/12/23 3522  •  senna (SENOKOT) tablet 17 2 mg, 2 tablet, Oral, Daily PRN, Lily Delcid, DO, 17 2 mg at 05/06/23 1140  •  senna-docusate sodium (SENOKOT S) 8 6-50 mg per tablet 2 tablet, 2 tablet, Oral, BID, Carmen Triana DO, 2 tablet at 05/12/23 2063  •  tamsulosin (FLOMAX) capsule 0 4 mg, 0 4 mg, Oral, Daily With Dinner, Lily Delcid, DO, 0 4 mg at 05/11/23 1734  •  ticagrelor (BRILINTA) tablet 90 mg, 90 mg, Oral, Q12H [address], Lily Delcid, DO, 90 mg at 05/12/23 0955    SKIN INTEGRITY:   no rashes, no erythema, no peripheral edema       PRIOR LEVEL OF FUNCTION:  He lives in [facility] single family home  Michele Oliver is  and lives with their spouse  Self Care: Independent, Indoor Mobility: Independent, Stairs (in/outdoor): Independent and Cognition: Independent    FALLS IN THE LAST 6 MONTHS: 0    HOME ENVIRONMENT:  The living area: bedroom on 2nd floor and bathroom on 2nd floor  There are 2 steps to enter the home  The patient will not have 24 hour supervision/physical assistance available upon discharge as wife still works but sister can assist as needed  PREVIOUS DME:  Equipment in home (previous DME):  Shower Chair and Grab Bars    FUNCTIONAL STATUS:  Physical Therapy Occupational Therapy Speech Therapy     As per PT:  5/11/2023       General   Chart Reviewed Yes   Response to Previous Treatment Patient with no complaints from previous session  Family/Caregiver Present No   Subjective   Subjective Pt agreeable to mobilize  Bed Mobility   Supine to Sit 3  Moderate assistance   Additional items Assist x 2; Increased time required;Verbal cues   Sit to Supine Unable to assess   Additional Comments Pt greeted in supine  Transfers   Sit to Stand 3  Moderate assistance   Additional items Assist x 2; Increased time required;Verbal cues   Stand to Sit 3  Moderate assistance   Additional items Assist x 2; Increased time required;Verbal cues   Additional Comments RW; ModAx2 initially, progressed to ModAx1 from chair   Ambulation/Elevation   Gait pattern Excessively slow; Short stride; Ataxia; Decreased foot clearance; Improper Weight shift; Shuffling;R Foot drag   Gait Assistance 3  Moderate assist  (ModAx2 w/ B HHA, MinAx 1 w/ rail)   Additional items Assist x 2;Verbal cues; Tactile cues   Assistive Device    (B HHA and rail in hallway)   Distance 20' + 15' + 15'   Balance   Static Sitting Fair   Dynamic Sitting Poor +   Static Standing Poor   Dynamic Standing Poor   Ambulatory Poor   Endurance Deficit   Endurance Deficit Yes   Endurance Deficit Description TURNER, fatigue, weakness   Activity Tolerance   Activity Tolerance Patient limited by fatigue   Medical Staff Made Aware mia Mondragon [facility]   Exercises   Neuro re-ed Dynamic sitting balance, performing ADL tasks  Assessment   Prognosis Good   Problem List Decreased strength;Decreased endurance;Decreased mobility; Impaired balance;Decreased coordination;Decreased cognition; Impaired judgement;Decreased safety awareness; Impaired vision; Impaired sensation; Impaired tone;Pain   Assessment Pt seen for PT treatment session w/ focus on bed mobility training, t/f training, + "gait training  Pt continues to demonstrate ataxic gait w/ short step length, RLE>LLE  Step length + steadiness improved w/ use of railing instead of B HHA  Continue to recommend rehab upon d/c  As per OT: 5/11/2023       ADL   Where Assessed Edge of bed   Grooming Assistance 4  Minimal Assistance   Grooming Deficit Brushing hair   UB Bathing Assistance 4  Minimal Assistance   UB Dressing Assistance 3  Moderate Assistance   LB Dressing Assistance 3  Moderate Assistance   Toileting Assistance  2  Maximal Assistance   Bed Mobility   Supine to Sit 3  Moderate assistance   Additional items Assist x 2   Transfers   Sit to Stand 3  Moderate assistance   Additional items Assist x 2   Stand to Sit 3  Moderate assistance   Additional items Assist x 2   Stand pivot 3  Moderate assistance   Additional items Assist x 2   Toilet transfer 3  Moderate assistance   Additional items Assist x 2   Functional Mobility   Functional Mobility 3  Moderate assistance   Additional Comments assist x 2   Additional items Hand hold assistance   Subjective   Subjective pt stated \" no I haven't gone yet\" pt reported that he has not had a BM   Cognition   Overall Cognitive Status Impaired   Arousal/Participation Responsive   Attention Difficulty attending to directions   Orientation Level Oriented to person;Oriented to place;Oriented to situation   Memory Unable to assess   Following Commands Follows one step commands inconsistently   Activity Tolerance   Activity Tolerance Patient tolerated treatment well   Assessment   Assessment Pt seen for participation in Occupational Therapy session with focus on activity tolerance, bed mob, functional transfers/mobr pt engagement in UB/LB self-care tasks and toilet transfers    Pt cleared by GARRETT/Felix for pt participated in OT session  Pt presented HOB raised pt awake/alert and agreeable to participate in therapy following pt identifiers confirmed  Pt did not reported his therapy goal this session    Pt " required assist for x 2 for bed mob, functional transfers and bedside commode transfers 2* decreased coordination and balance deficits  Pt will require post acute rehab service to continue to address these above noted pt deficit which currently impair pt ADL and functional mob  Pt return  bedside chair post session, chair  alarm activated and all needs within reach  As per SLP: 5/8/23     Today's Date: 5/8/2023     Problem List  Principal Problem:    Stroke [facility])  Active Problems:    Primary hypertension    Nicotine dependence    Bilateral carotid artery stenosis    Urinary retention    Adjustment reaction with anxiety    Type 2 diabetes mellitus (HCC)    Chronic ischemic right MCA stroke    Chronic low back pain    Hyponatremia    Prediabetes    Tachycardia        Past Medical History  Medical History        Past Medical History:   Diagnosis Date   • Depression     • Diabetes mellitus ([facility] [address] )     • Dyslipidemia 03/26/2019   • Hyperlipidemia     • Hypertension     • Stroke [facility])              Past Surgical History  Surgical History         Past Surgical History:   Procedure Laterality Date   • BACK SURGERY       • IR CEREBRAL ANGIOGRAPHY   5/4/2023   • IR STROKE ALERT   3/19/2019   • SHOULDER SURGERY                   Subjective:  Pt awake and alert  OOB in chair  Objective:  Pt seen for ongoing tx of expressive language disorder  Pt seen w/ word finding and writing activities  Verbal expression included more phonemic paraphasias today then previous sessions  Pt was able to name numbers independently and write out numbers w/ 1-10 written as model at top of paper  Pt able to name colors when given max verbal cues (what color is this    gr )  Pt able to write colors w/ about 50% accuracy (some illegible letters, some missing letters)  Writing overall is improved from previous sessions  Independent writing accuracy about 30%, when given model improves to 60%   Word finding appears to influence writing/ lexicon recall       Assessment:  Pt showing improvements in writing abilities w/ more independent accuracy and improved ability to copy w/ model  Word finding continues to aided by verbal cues  Conversational speech is more fluent w/ less word finding difficulties       Plan/Recommendations:  Continue targeting word finding/writing   Use of models for writing may be beneficial  ST to f/u       CARE SCORES:  Self Care:  Eatin: Supervision or touching  assistance  Oral hygiene: 04: Supervision or touching  assistance  Toilet hygiene: 02: Substantial/maximal assistance  Shower/bathing self: 04: Supervision or touching  assistance  Upper body dressin: Partial/moderate assistance  Lower body dressin: Partial/moderate assistance  Putting on/taking off footwear: 03: Partial/moderate assistance  Transfers:  Roll left and right: 03: Partial/moderate assistance X 2  Sit to lyin: Partial/moderate assistance  X 2  Lying to sitting on side of bed: 03: Partial/moderate assistance X 2  Sit to stand: 03: Partial/moderate assistance X 2  Chair/bed to chair transfer: 03: Partial/moderate assistance X 2  Toilet transfer: 03: Partial/moderate assistance X 2  Mobility:  Walk 10 ft: 03: Partial/moderate assistance X 2 HHA  Walk 50 ft with two turns: 09: Not applicable  Walk 695YE: 09: Not applicable    CURRENT GAP IN FUNCTION  Prior to Admission: Functional Status: Patient was independent with mobility/ambulation, transfers, ADL's, IADL's  Expected functional outcomes: It is expected that with skilled acute rehabilitation services the patient will progress to Supervision for self care and Supervision for mobility     Estimated length of stay: 10 to 14 days    Anticipated Post-Discharge Disposition/Treatment  Disposition: Return to previous home/apartment    Outpatient Services: Physical Therapy (PT), Occupational Therapy (OT) and Speech Therapy    BARRIERS TO DISCHARGE  Weakness, Diminished cognition/Mentation change, Balance Difficulty, Fatigue, Home Accessibility, Caregiver Accessibility, Financial Resources, Equipment Needs and Resource Availability    INTERVENTIONS FOR DISCHARGE  Adaptive equipment, Patient/Family/Caregiver Education, [employer], Financial Assistance, Arrange DME needs, Medication Changes as per MD recommendations, Therapy exercises, Center of balance support  and Energy conservation education     REQUIRED THERAPY:  Patient will require PT, OT and ST 60 minutes each per day, five days per week to achieve rehab goals  REQUIRED FUNCTIONAL AND MEDICAL MANAGEMENT FOR INPATIENT REHABILITATION:  Deep Vein Thrombosis (DVT) Prophylaxis:  heparin and SCD's while in bed, Nursing education and bowel/bladder management, internal medicine to manage/monitor medical conditions, PM&R to maximize function and provide medical oversight, PT/OT intervention, patient/family education/training, and any consults as needed  RECOMMENDED LEVEL OF CARE: Jose Luis Cook is a 57 yo male who presented to [facility] ED on 4/26/23 due to wife witnessing altered mental status, confusion with abnormal speech and behavior similar to a previous CVA symptoms from Left MCA CVA on 4/16/23  Patient with history of previous R MCA CVA s/p thrombectomy in March 2019 without clear etiology despite years of loop recorder, recent L MCA stroke 4/16/23 (empirically AC eliquis 5mg BID w/ ASA 81mg  due to concern for ESUS)  He presents 4/26/23 with L facial droop, R sided weakness, and AMS  Patient did not receive TNK given recent stroke and on Eliquis  Loop recorder negative for arrhythmias  While here, he was found to have new multifocal strokes  IVY 5/2 shows EF 60% with no PFO  MRI 5/2 showed left parietal stroke that appears larger  He underwent angiogram to assess for vasculitis, and he is now s/p left ICA stent 5/4 without signs of vasculitis  Patient was initiated on brilinta on 4/27    MRI completed on 4/26/23    Imaging compared to the prior recent MRI (4/17/23) showed interval increase in size of diffusion abnormality in the left parietal subcortical and periventricular white matter consistent with acute to subacute ischemia  New small focus of gyral diffusion abnormality in the right frontal parietal region  Persistent additional small foci of diffusion abnormality in the left periatrial and parieto-occipital region  No acute hemorrhage or midline shift  Stable moderate chronic microangiopathic changes within the brain  Repeat MRI 5/2 showed left parietal stroke that appeared larger  Abdominal imaging revealed patient has a 3/4 fusiform infrarenal abdominal aortic aneurysm and is to follow up in the outpatient setting  Patient also c/o low back pain  This is chronic  MRI of the spine revealed multilevel spondylosis and is controlled with multimodal analgesics  Prior to admission / hospital stay patient was independent with all ADLs, functional mobility and shared IADLs with spouse  Currently with PT therapies: Patient is requiring mod A x 2 with bed mobility, transfers and ambulation at [facility]  Patient was able to demonstrate distance of 20' + 15' + 15'  Currently with OT therapies:  Patient is requiring min A with grooming and UB bathing  He is requiring mod A for UB/LB dressing  Toileting is requiring max A    Nursing is being recommended for medication distribution and management, bowel and bladder management and educational purposes, internal medicine to continue to monitor and manage medical conditions, PM&R to maximize  function and provide medical oversight, and inpatient rehab to maximize self-care, mobility, strength and endurance upon discharge to home

## 2023-04-28 NOTE — ARC ADMISSION
Referral received for patient consideration of ARC placement for rehab  After reviewing with [facility] physician, patient has been pre-approved for [facility] pending medical stability, insurance authorization and bed availability  CM has been updated

## 2023-04-28 NOTE — PLAN OF CARE
Problem: PAIN - ADULT  Goal: Verbalizes/displays adequate comfort level or baseline comfort level  Description: Interventions:  - Encourage patient to monitor pain and request assistance  - Assess pain using appropriate pain scale  - Administer analgesics based on type and severity of pain and evaluate response  - Implement non-pharmacological measures as appropriate and evaluate response  - Consider cultural and social influences on pain and pain management  - Notify physician/advanced practitioner if interventions unsuccessful or patient reports new pain  Outcome: Progressing     Problem: INFECTION - ADULT  Goal: Absence or prevention of progression during hospitalization  Description: INTERVENTIONS:  - Assess and monitor for signs and symptoms of infection  - Monitor lab/diagnostic results  - Monitor all insertion sites, i e  indwelling lines, tubes, and drains  - Monitor endotracheal if appropriate and nasal secretions for changes in amount and color  - Amelia appropriate cooling/warming therapies per order  - Administer medications as ordered  - Instruct and encourage patient and family to use good hand hygiene technique  - Identify and instruct in appropriate isolation precautions for identified infection/condition  Outcome: Progressing     Problem: SAFETY ADULT  Goal: Patient will remain free of falls  Description: INTERVENTIONS:  - Educate patient/family on patient safety including physical limitations  - Instruct patient to call for assistance with activity   - Consult OT/PT to assist with strengthening/mobility   - Keep Call bell within reach  - Keep bed low and locked with side rails adjusted as appropriate  - Keep care items and personal belongings within reach  - Initiate and maintain comfort rounds  - Make Fall Risk Sign visible to staff  - Offer Toileting every  Hours, in advance of need  - Initiate/Maintain alarm  - Obtain necessary fall risk management equipment:   - Apply yellow socks and bracelet for high fall risk patients  - Consider moving patient to room near nurses station  Outcome: Progressing     Problem: DISCHARGE PLANNING  Goal: Discharge to home or other facility with appropriate resources  Description: INTERVENTIONS:  - Identify barriers to discharge w/patient and caregiver  - Arrange for needed discharge resources and transportation as appropriate  - Identify discharge learning needs (meds, wound care, etc )  - Arrange for interpretive services to assist at discharge as needed  - Refer to Case Management Department for coordinating discharge planning if the patient needs post-hospital services based on physician/advanced practitioner order or complex needs related to functional status, cognitive ability, or social support system  Outcome: Progressing     Problem: Knowledge Deficit  Goal: Patient/family/caregiver demonstrates understanding of disease process, treatment plan, medications, and discharge instructions  Description: Complete learning assessment and assess knowledge base  Interventions:  - Provide teaching at level of understanding  - Provide teaching via preferred learning methods  Outcome: Progressing     Problem: NEUROSENSORY - ADULT  Goal: Achieves stable or improved neurological status  Description: INTERVENTIONS  - Monitor and report changes in neurological status  - Monitor vital signs such as temperature, blood pressure, glucose, and any other labs ordered   - Initiate measures to prevent increased intracranial pressure  - Monitor for seizure activity and implement precautions if appropriate      Outcome: Progressing  Goal: Achieves maximal functionality and self care  Description: INTERVENTIONS  - Monitor swallowing and airway patency with patient fatigue and changes in neurological status  - Encourage and assist patient to increase activity and self care     - Encourage visually impaired, hearing impaired and aphasic patients to use assistive/communication devices  Outcome: Progressing     Problem: CARDIOVASCULAR - ADULT  Goal: Maintains optimal cardiac output and hemodynamic stability  Description: INTERVENTIONS:  - Monitor I/O, vital signs and rhythm  - Monitor for S/S and trends of decreased cardiac output  - Administer and titrate ordered vasoactive medications to optimize hemodynamic stability  - Assess quality of pulses, skin color and temperature  - Assess for signs of decreased coronary artery perfusion  - Instruct patient to report change in severity of symptoms  Outcome: Progressing  Goal: Absence of cardiac dysrhythmias or at baseline rhythm  Description: INTERVENTIONS:  - Continuous cardiac monitoring, vital signs, obtain 12 lead EKG if ordered  - Administer antiarrhythmic and heart rate control medications as ordered  - Monitor electrolytes and administer replacement therapy as ordered  Outcome: Progressing     Problem: METABOLIC, FLUID AND ELECTROLYTES - ADULT  Goal: Electrolytes maintained within normal limits  Description: INTERVENTIONS:  - Monitor labs and assess patient for signs and symptoms of electrolyte imbalances  - Administer electrolyte replacement as ordered  - Monitor response to electrolyte replacements, including repeat lab results as appropriate  - Instruct patient on fluid and nutrition as appropriate  Outcome: Progressing  Goal: Glucose maintained within target range  Description: INTERVENTIONS:  - Monitor Blood Glucose as ordered  - Assess for signs and symptoms of hyperglycemia and hypoglycemia  - Administer ordered medications to maintain glucose within target range  - Assess nutritional intake and initiate nutrition service referral as needed  Outcome: Progressing

## 2023-04-28 NOTE — SPEECH THERAPY NOTE
Speech Language/Pathology    Speech/Language Pathology Progress Note    Patient Name: Giovanni Patiño  LREEY'O Date: 4/28/2023     Problem List  Principal Problem:    Stroke [facility])  Active Problems:    Primary hypertension    Nicotine dependence    Adjustment reaction with anxiety    Chronic ischemic right MCA stroke    Chronic low back pain    Hyponatremia    Prediabetes    Hypokalemia    Leukocytosis       Past Medical History  Past Medical History:   Diagnosis Date   • Depression    • Diabetes mellitus ([facility] [address] )    • Dyslipidemia 03/26/2019   • Hyperlipidemia    • Hypertension    • Stroke [facility])         Past Surgical History  Past Surgical History:   Procedure Laterality Date   • BACK SURGERY     • IR STROKE ALERT  3/19/2019   • SHOULDER SURGERY           Subjective:  Pt awake and alert  OOB in chair  Objective:  Pt seen for ongoing tx of expressive language disorder  Pt seen w/ word finding activities including picture naming and divergent naming  Pt achieved about 30% accuracy independently naming images, increased to 60% w/ max verbal cues  Pt achieved 25% independent accuracy on divergent naming tasks, increased to 50% given max verbal cues  Pt often requires max verbal cues including clues about the word, first sound, etc  Pt was able to name his children and wife, but not the state or town he lived in  Spontaneous speech was more fluent (happy birthday)  Conversationally, pt shows improved expression  Assessment:  Pt primary difficulty remains confrontation naming/word finding  Encouraged pt to read newspaper, etc aloud to encourage fluent speech  Also to name items around the room       Plan/Recommendations:  Continue to target word finding impairments  Plan to target written expression  ST to f/u

## 2023-04-28 NOTE — ASSESSMENT & PLAN NOTE
Pt presented to the ER as a stroke alert with history of known severe R M1 stenosis, prior tobacco use, recent L MCA stroke (had aphasia) 04/16/2023 came in as a Stroke alert on 4/26/2023  NIHSS of 2  Initial presenting deficits were L facial droop, R sided weakness, and AMS   As a result of past stroke within the past month and on eliquis pt was determined to not be a candidate for thrombolysis   · 4/26/2023 CTA of the head and neck-Stable moderate (60 to 65%) stenosis in the bilateral internal carotid arteries  Stable severe stenosis distal right M1 segment  Stable severe stenosis proximal left M2  No new intracranial stenosis or large vessel occlusion  · MRI brain 4/27- Compared to the prior recent MRI examination dated 4/17/2023, interval increase in size of diffusion abnormality in the left parietal subcortical and periventricular white matter consistent with acute to subacute ischemia  New small focus of gyral diffusion abnormality in the right frontal parietal region  Persistent additional small foci of diffusion abnormality in the left periatrial and parieto-occipital region  No acute hemorrhage or midline shift  Stable moderate chronic microangiopathic changes within the brain  · Stroke pathway  · Allow for permissive hypertension  · A1c 5 8 lipid panel  · Started on Brilinta 4/27  · Continue with Eliquis 5 mg twice daily  · No need for repeat echocardiogram at this time as echo was recently completed on previous admission 4/18/2023  · Echo-EF 55%  Systolic function is normal  Wall motion is normal   Grade 1 diastolic dysfunction left and right atrium mildly dilated  Mild aortic valve stenosis    Mild mitral and tricuspid valve regurgitation  · EEG dcd by neuro   · No arrythmias on tele   · Neurology following  · PT/OT recommending acute rehab at discharge

## 2023-04-28 NOTE — CASE MANAGEMENT
Nancy Medina 49 received request for authorization from Care Manager    Authorization request for: Acute Rehab  Facility Name: [facility]  TWS:[id number]  Facility MD:  Britta Beckman  NPI: [id number]   Authorization initiated by contacting insurance: [employer] Via: [employer]  Pending Reference #:[id number]  Clinicals submitted via: Yola Mendoza

## 2023-04-28 NOTE — ASSESSMENT & PLAN NOTE
· Noted on admission with wbc 12 38--> 11 01 --> 11 59  · chest xray pending   · procal levels negative   · Trend wbcs   · Covid/flu/rsv negative

## 2023-04-28 NOTE — PHYSICAL THERAPY NOTE
Physical Therapy Treatment Note    Patient's Name: Ryann Wall  : 23 1206   PT Last Visit   PT Visit Date 23   Note Type   Note Type Treatment   Pain Assessment   Pain Assessment Tool 0-10   Pain Score No Pain   Restrictions/Precautions   Weight Bearing Precautions Per Order No   Other Precautions Cognitive; Chair Alarm; Bed Alarm;Telemetry; Fall Risk   General   Chart Reviewed Yes   Response to Previous Treatment   (Pt reports decreased cognition from baseline, agreeable to participate )   Family/Caregiver Present Yes  (spouse + sister)   Cognition   Orientation Level Oriented to person;Oriented to place;Oriented to situation;Disoriented to time   Comments word finding difficulties/expressive aphasia   Subjective   Subjective Pt agreeable to mobilize  Bed Mobility   Additional Comments Pt greeted in chair  Transfers   Sit to Stand   (CGA)   Additional items Assist x 1; Increased time required;Verbal cues;Armrests   Stand to Sit 4  Minimal assistance   Additional items Assist x 1; Increased time required;Verbal cues   Additional Comments no AD   Ambulation/Elevation   Gait pattern Excessively slow; Short stride; Improper Weight shift;Decreased foot clearance;Shuffling   Gait Assistance 4  Minimal assist   Additional items Assist x 1;Verbal cues; Tactile cues   Assistive Device None   Distance 60'x2   Stair Management Assistance 3  Moderate assist   Additional items Assist x 1;Verbal cues; Tactile cues   Stair Management Technique Two rails; Nonreciprocal   Number of Stairs 7   Balance   Static Sitting Fair +   Dynamic Sitting Fair   Static Standing Fair -   Dynamic Standing Poor +   Ambulatory Poor +   Endurance Deficit   Endurance Deficit Yes   Endurance Deficit Description TURNER (SpO2 WNL on RA), decreased gait quality w/ fatigue   Activity Tolerance   Activity Tolerance Patient limited by fatigue   Medical Staff Made Aware CM   Assessment   Prognosis Good   Problem List Decreased endurance; Impaired balance;Decreased mobility; Decreased coordination; Impaired judgement;Decreased safety awareness; Impaired vision;Obesity   Assessment Pt seen for PT treatment session w/ focus on t/f training, gait training, + stair training  Pt demonstrated improvements this session w/ decreased assistance required for t/f  Pt initially CGA for ambulation but required increased assistance w/ increased distance/fatigue, cues for pacing + obstacle negotiation required  Recommend acute medical rehab upon d/c to address impairments in activity tolerance, balance, + cognition/safety awareness  Pt + pt's spouse in agreement w/ recommendation  Goals   Patient Goals to improve   PT Treatment Day 1   Plan   Treatment/Interventions Functional transfer training;LE strengthening/ROM; Elevations; Therapeutic exercise; Endurance training;Patient/family training;Equipment eval/education; Bed mobility; Compensatory technique education;Gait training;Spoke to nursing;Spoke to case management; Family  (balance training)   Progress Progressing toward goals   PT Frequency 3-5x/wk   Recommendation   PT Discharge Recommendation Post acute rehabilitation services  (acute medical rehab)   AM-PAC Basic Mobility Inpatient   Turning in Flat Bed Without Bedrails 3   Lying on Back to Sitting on Edge of Flat Bed Without Bedrails 3   Moving Bed to Chair 3   Standing Up From Chair Using Arms 3   Walk in Room 3   Climb 3-5 Stairs With Railing 2   Basic Mobility Inpatient Raw Score 17   Basic Mobility Standardized Score 39 67   Highest Level Of Mobility   JH-HLM Goal 5: Stand one or more mins   JH-HLM Achieved 7: Walk 25 feet or more   Education   Education Provided Mobility training   Patient Demonstrates acceptance/verbal understanding;Reinforcement needed   End of Consult   Patient Position at End of Consult Bedside chair;Bed/Chair alarm activated; All needs within reach  (family at bedside)       Mary Wolfe, PT, DPT

## 2023-04-28 NOTE — ASSESSMENT & PLAN NOTE
· Per wife and his BP records, his BP has been uncontrolled  · Compliance has not been ideal until she started managing his medications    · Continue with metoprolol 100mg daily, losartan 50mg daily, hydralazine 25mg TID and amlodipine 10mg daily, restarted HCTZ 25mg on 4/27 - monitor Bps and adjust PRN  · Low Na diet

## 2023-04-28 NOTE — CASE MANAGEMENT
Case Management Discharge Planning Note    Patient name Carolina Leonard  Location [address] [place] [address] 703/[facility] [id number] MRN [id number]  : 1960 Date 2023       Current Admission Date: 2023  Current Admission Diagnosis:Stroke [facility])   Patient Active Problem List    Diagnosis Date Noted   • Prediabetes 2023   • Hypokalemia 2023   • Leukocytosis 2023   • Chronic anticoagulation 2023   • Stroke ([facility] [address] ) 2023   • Hyponatremia 2023   • Middle cerebral artery stenosis, right 2023   • Left posterior MCA stroke - etiology unclear at this time 2023   • Chronic low back pain 2023   • Hypertensive encephalopathy, transient 2023   • Snoring 08/10/2020   • Memory difficulties 08/10/2020   • Chronic ischemic right MCA stroke 2019   • Status post placement of implantable loop recorder 2019   • Adjustment reaction with anxiety 2019   • Insomnia 2019   • Fall 2019   • 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 ([facility] [address] ) 2019   • Constipation 2019   • Urinary retention 2019   • Hypertriglyceridemia 2019   • Nicotine dependence 2019   • Stenosis of right carotid artery 2019   • Presbyopia 2019   • History of stroke 2019   • Headache 2019   • Primary hypertension 2019   • GERD (gastroesophageal reflux disease) 2019      LOS (days): 2  Geometric Mean LOS (GMLOS) (days): 2 90  Days to GMLOS:1 2     OBJECTIVE:  Risk of Unplanned Readmission Score: 18 56         Current admission status: Inpatient   Preferred Pharmacy:   [facility] [address] AYAL[facility] [place], [place] - [address]  [address] [facility]  Phone: [phone] Fax: Jose Mmsmitchell 21, [address] [place] [address]  Phone: [phone] Fax: [phone]    Primary Care Provider: ERIC Calderon    Primary Insurance: BLUE CROSS  Secondary Insurance: [employer]  REP    DISCHARGE DETAILS:           Additional Comments: CM conferred with [facility] provider and is aware the pt is stable for hospital discharge  PT/OT evaluated the pt on 4/27 and recommend PMR c/s  Consult will be placed on this date  FOC was explored with the pt whom is amenable to receiving IRF at [facility]  Proactive referral was forwarded and PMR consult is pending  If approved for [facility], insurance authorization is required to facilitate admission as the pt has a commercial [employer]  CM will continue to follow

## 2023-04-28 NOTE — PLAN OF CARE
Problem: PAIN - ADULT  Goal: Verbalizes/displays adequate comfort level or baseline comfort level  Description: Interventions:  - Encourage patient to monitor pain and request assistance  - Assess pain using appropriate pain scale  - Administer analgesics based on type and severity of pain and evaluate response  - Implement non-pharmacological measures as appropriate and evaluate response  - Consider cultural and social influences on pain and pain management  - Notify physician/advanced practitioner if interventions unsuccessful or patient reports new pain  Outcome: Progressing     Problem: INFECTION - ADULT  Goal: Absence or prevention of progression during hospitalization  Description: INTERVENTIONS:  - Assess and monitor for signs and symptoms of infection  - Monitor lab/diagnostic results  - Monitor all insertion sites, i e  indwelling lines, tubes, and drains  - Monitor endotracheal if appropriate and nasal secretions for changes in amount and color  - Normanna appropriate cooling/warming therapies per order  - Administer medications as ordered  - Instruct and encourage patient and family to use good hand hygiene technique  - Identify and instruct in appropriate isolation precautions for identified infection/condition  Outcome: Progressing     Problem: SAFETY ADULT  Goal: Patient will remain free of falls  Description: INTERVENTIONS:  - Educate patient/family on patient safety including physical limitations  - Instruct patient to call for assistance with activity   - Consult OT/PT to assist with strengthening/mobility   - Keep Call bell within reach  - Keep bed low and locked with side rails adjusted as appropriate  - Keep care items and personal belongings within reach  - Initiate and maintain comfort rounds  - Make Fall Risk Sign visible to staff  - Offer Toileting every 2 Hours, in advance of need  - Initiate/Maintain bed alarm  - Apply yellow socks and bracelet for high fall risk patients  - Consider moving patient to room near nurses station  Outcome: Progressing     Problem: DISCHARGE PLANNING  Goal: Discharge to home or other facility with appropriate resources  Description: INTERVENTIONS:  - Identify barriers to discharge w/patient and caregiver  - Arrange for needed discharge resources and transportation as appropriate  - Identify discharge learning needs (meds, wound care, etc )  - Arrange for interpretive services to assist at discharge as needed  - Refer to Case Management Department for coordinating discharge planning if the patient needs post-hospital services based on physician/advanced practitioner order or complex needs related to functional status, cognitive ability, or social support system  Outcome: Progressing     Problem: Knowledge Deficit  Goal: Patient/family/caregiver demonstrates understanding of disease process, treatment plan, medications, and discharge instructions  Description: Complete learning assessment and assess knowledge base  Interventions:  - Provide teaching at level of understanding  - Provide teaching via preferred learning methods  Outcome: Progressing     Problem: NEUROSENSORY - ADULT  Goal: Achieves stable or improved neurological status  Description: INTERVENTIONS  - Monitor and report changes in neurological status  - Monitor vital signs such as temperature, blood pressure, glucose, and any other labs ordered   - Initiate measures to prevent increased intracranial pressure  - Monitor for seizure activity and implement precautions if appropriate      Outcome: Progressing  Goal: Achieves maximal functionality and self care  Description: INTERVENTIONS  - Monitor swallowing and airway patency with patient fatigue and changes in neurological status  - Encourage and assist patient to increase activity and self care     - Encourage visually impaired, hearing impaired and aphasic patients to use assistive/communication devices  Outcome: Progressing     Problem: CARDIOVASCULAR - ADULT  Goal: Maintains optimal cardiac output and hemodynamic stability  Description: INTERVENTIONS:  - Monitor I/O, vital signs and rhythm  - Monitor for S/S and trends of decreased cardiac output  - Administer and titrate ordered vasoactive medications to optimize hemodynamic stability  - Assess quality of pulses, skin color and temperature  - Assess for signs of decreased coronary artery perfusion  - Instruct patient to report change in severity of symptoms  Outcome: Progressing  Goal: Absence of cardiac dysrhythmias or at baseline rhythm  Description: INTERVENTIONS:  - Continuous cardiac monitoring, vital signs, obtain 12 lead EKG if ordered  - Administer antiarrhythmic and heart rate control medications as ordered  - Monitor electrolytes and administer replacement therapy as ordered  Outcome: Progressing     Problem: METABOLIC, FLUID AND ELECTROLYTES - ADULT  Goal: Electrolytes maintained within normal limits  Description: INTERVENTIONS:  - Monitor labs and assess patient for signs and symptoms of electrolyte imbalances  - Administer electrolyte replacement as ordered  - Monitor response to electrolyte replacements, including repeat lab results as appropriate  - Instruct patient on fluid and nutrition as appropriate  Outcome: Progressing  Goal: Glucose maintained within target range  Description: INTERVENTIONS:  - Monitor Blood Glucose as ordered  - Assess for signs and symptoms of hyperglycemia and hypoglycemia  - Administer ordered medications to maintain glucose within target range  - Assess nutritional intake and initiate nutrition service referral as needed  Outcome: Progressing

## 2023-04-28 NOTE — PLAN OF CARE
Problem: PAIN - ADULT  Goal: Verbalizes/displays adequate comfort level or baseline comfort level  Description: Interventions:  - Encourage patient to monitor pain and request assistance  - Assess pain using appropriate pain scale  - Administer analgesics based on type and severity of pain and evaluate response  - Implement non-pharmacological measures as appropriate and evaluate response  - Consider cultural and social influences on pain and pain management  - Notify physician/advanced practitioner if interventions unsuccessful or patient reports new pain  Outcome: Progressing     Problem: INFECTION - ADULT  Goal: Absence or prevention of progression during hospitalization  Description: INTERVENTIONS:  - Assess and monitor for signs and symptoms of infection  - Monitor lab/diagnostic results  - Monitor all insertion sites, i e  indwelling lines, tubes, and drains  - Monitor endotracheal if appropriate and nasal secretions for changes in amount and color  - [place] appropriate cooling/warming therapies per order  - Administer medications as ordered  - Instruct and encourage patient and family to use good hand hygiene technique  - Identify and instruct in appropriate isolation precautions for identified infection/condition  Outcome: Progressing     Problem: SAFETY ADULT  Goal: Patient will remain free of falls  Description: INTERVENTIONS:  - Educate patient/family on patient safety including physical limitations  - Instruct patient to call for assistance with activity   - Consult OT/PT to assist with strengthening/mobility   - Keep Call bell within reach  - Keep bed low and locked with side rails adjusted as appropriate  - Keep care items and personal belongings within reach  - Initiate and maintain comfort rounds  - Make Fall Risk Sign visible to staff  - Apply yellow socks and bracelet for high fall risk patients  - Consider moving patient to room near nurses station  Outcome: Progressing     Problem: DISCHARGE PLANNING  Goal: Discharge to home or other facility with appropriate resources  Description: INTERVENTIONS:  - Identify barriers to discharge w/patient and caregiver  - Arrange for needed discharge resources and transportation as appropriate  - Identify discharge learning needs (meds, wound care, etc )  - Arrange for interpretive services to assist at discharge as needed  - Refer to Case Management Department for coordinating discharge planning if the patient needs post-hospital services based on physician/advanced practitioner order or complex needs related to functional status, cognitive ability, or social support system  Outcome: Progressing     Problem: Knowledge Deficit  Goal: Patient/family/caregiver demonstrates understanding of disease process, treatment plan, medications, and discharge instructions  Description: Complete learning assessment and assess knowledge base  Interventions:  - Provide teaching at level of understanding  - Provide teaching via preferred learning methods  Outcome: Progressing     Problem: NEUROSENSORY - ADULT  Goal: Achieves stable or improved neurological status  Description: INTERVENTIONS  - Monitor and report changes in neurological status  - Monitor vital signs such as temperature, blood pressure, glucose, and any other labs ordered   - Initiate measures to prevent increased intracranial pressure  - Monitor for seizure activity and implement precautions if appropriate      Outcome: Progressing  Goal: Achieves maximal functionality and self care  Description: INTERVENTIONS  - Monitor swallowing and airway patency with patient fatigue and changes in neurological status  - Encourage and assist patient to increase activity and self care     - Encourage visually impaired, hearing impaired and aphasic patients to use assistive/communication devices  Outcome: Progressing     Problem: CARDIOVASCULAR - ADULT  Goal: Maintains optimal cardiac output and hemodynamic stability  Description: INTERVENTIONS:  - Monitor I/O, vital signs and rhythm  - Monitor for S/S and trends of decreased cardiac output  - Administer and titrate ordered vasoactive medications to optimize hemodynamic stability  - Assess quality of pulses, skin color and temperature  - Assess for signs of decreased coronary artery perfusion  - Instruct patient to report change in severity of symptoms  Outcome: Progressing  Goal: Absence of cardiac dysrhythmias or at baseline rhythm  Description: INTERVENTIONS:  - Continuous cardiac monitoring, vital signs, obtain 12 lead EKG if ordered  - Administer antiarrhythmic and heart rate control medications as ordered  - Monitor electrolytes and administer replacement therapy as ordered  Outcome: Progressing     Problem: METABOLIC, FLUID AND ELECTROLYTES - ADULT  Goal: Electrolytes maintained within normal limits  Description: INTERVENTIONS:  - Monitor labs and assess patient for signs and symptoms of electrolyte imbalances  - Administer electrolyte replacement as ordered  - Monitor response to electrolyte replacements, including repeat lab results as appropriate  - Instruct patient on fluid and nutrition as appropriate  Outcome: Progressing  Goal: Glucose maintained within target range  Description: INTERVENTIONS:  - Monitor Blood Glucose as ordered  - Assess for signs and symptoms of hyperglycemia and hypoglycemia  - Administer ordered medications to maintain glucose within target range  - Assess nutritional intake and initiate nutrition service referral as needed  Outcome: Progressing

## 2023-04-28 NOTE — PROGRESS NOTES
[address] [facility]  Progress Note  Name: Brando Palacios  MRN: [id number]  Unit/Bed#: [facility] 703-01 I Date of Admission: 4/26/2023   Date of Service: 4/28/2023 I Hospital Day: 2    Assessment/Plan   * Stroke [facility])  Assessment & Plan  Pt presented to the ER as a stroke alert with history of known severe R M1 stenosis, prior tobacco use, recent L MCA stroke (had aphasia) 04/16/2023 came in as a Stroke alert on 4/26/2023  NIHSS of 2  Initial presenting deficits were L facial droop, R sided weakness, and AMS   As a result of past stroke within the past month and on eliquis pt was determined to not be a candidate for thrombolysis   · 4/26/2023 CTA of the head and neck-Stable moderate (60 to 65%) stenosis in the bilateral internal carotid arteries  Stable severe stenosis distal right M1 segment  Stable severe stenosis proximal left M2  No new intracranial stenosis or large vessel occlusion  · MRI brain 4/27- Compared to the prior recent MRI examination dated 4/17/2023, interval increase in size of diffusion abnormality in the left parietal subcortical and periventricular white matter consistent with acute to subacute ischemia  New small focus of gyral diffusion abnormality in the right frontal parietal region  Persistent additional small foci of diffusion abnormality in the left periatrial and parieto-occipital region  No acute hemorrhage or midline shift  Stable moderate chronic microangiopathic changes within the brain  · Stroke pathway  · Allow for permissive hypertension  · A1c 5 8 lipid panel  · Started on Brilinta 4/27  · Continue with Eliquis 5 mg twice daily  · No need for repeat echocardiogram at this time as echo was recently completed on previous admission 4/18/2023  · Echo-EF 55%  Systolic function is normal  Wall motion is normal   Grade 1 diastolic dysfunction left and right atrium mildly dilated  Mild aortic valve stenosis    Mild mitral and tricuspid valve regurgitation  · EEG dcd by neuro   · No arrythmias on tele   · Neurology following  · PT/OT recommending acute rehab at discharge     Leukocytosis  Assessment & Plan  · Noted on admission with wbc 12 38--> 11 01 --> 11 59  · chest xray pending   · procal levels negative   · Trend wbcs   · Covid/flu/rsv negative     Hypokalemia  Assessment & Plan  · Repleted    Prediabetes  Assessment & Plan  · a1c 5 8  · Currently holding metformin while hospitalized     Hyponatremia  Assessment & Plan  · Likely due to poor oral intake and HCTZ use  · HCTZ resumed  · Check BMP    Chronic low back pain  Assessment & Plan  · Reports chronic lower back pain for 4 weeks which has not improved despite multiple trails of steroids, nsaids and muscle relaxants- currently denies any back pain at this time   · XR of lumbar spine ordered by PCP: No lumbar vertebral body fracture or subluxation  Multilevel degenerative changes  · PRN tylenol      Chronic ischemic right MCA stroke  Assessment & Plan  · Continue aspirin, Eliquis and statin  · PT/OT/ST  · Supportive care    Adjustment reaction with anxiety  Assessment & Plan  · Continue celexa   · Provide supportive care     Nicotine dependence  Assessment & Plan  · Encourage cessation   · Added nicoderm patch     Primary hypertension  Assessment & Plan  · Per wife and his BP records, his BP has been uncontrolled  · Compliance has not been ideal until she started managing his medications  · Continue with metoprolol 100mg daily, losartan 50mg daily, hydralazine 25mg TID and amlodipine 10mg daily, restarted HCTZ 25mg on 4/27 - monitor Bps and adjust PRN  · Low Na diet             VTE Pharmacologic Prophylaxis: VTE Score: 3 Moderate Risk (Score 3-4) - Pharmacological DVT Prophylaxis Ordered: apixaban (Eliquis)  Patient Centered Rounds: I performed bedside rounds with nursing staff today    Discussions with Specialists or Other Care Team Provider: d/w RN     Education and Discussions with Family / Patient: Updated  (wife) via phone  Total Time Spent on Date of Encounter in care of patient: 35 minutes This time was spent on one or more of the following: performing physical exam; counseling and coordination of care; obtaining or reviewing history; documenting in the medical record; reviewing/ordering tests, medications or procedures; communicating with other healthcare professionals and discussing with patient's family/caregivers  Current Length of Stay: 2 day(s)  Current Patient Status: Inpatient   Certification Statement: The patient will continue to require additional inpatient hospital stay due to pending rehab placement   Discharge Plan: Anticipate discharge in 24-48 hrs to rehab facility  Code Status: Level 1 - Full Code    Subjective:   Pt reports he is feeling frustrated  He reports he doesn't have any patience  He wants to get back to his baseline now and doesn't want to wait  Reports he is still having trouble with processing but feels he is physically moving better  Denies any other complaints     Objective:     Vitals:   Temp (24hrs), Av 2 °F (36 8 °C), Min:97 7 °F (36 5 °C), Max:98 5 °F (36 9 °C)    Temp:  [97 7 °F (36 5 °C)-98 5 °F (36 9 °C)] 98 °F (36 7 °C)  HR:  [68-87] 87  Resp:  [16-18] 18  BP: (113-158)/() 142/73  SpO2:  [94 %-98 %] 98 %  Body mass index is 34 53 kg/m²  Input and Output Summary (last 24 hours): Intake/Output Summary (Last 24 hours) at 2023 0956  Last data filed at 2023 1915  Gross per 24 hour   Intake 118 ml   Output 550 ml   Net -432 ml       Physical Exam:   Physical Exam  Constitutional:       General: He is not in acute distress  Appearance: He is obese  HENT:      Head: Normocephalic and atraumatic  Mouth/Throat:      Mouth: Mucous membranes are moist    Eyes:      General: No scleral icterus  Pupils: Pupils are equal, round, and reactive to light     Cardiovascular:      Rate and Rhythm: Normal rate and regular rhythm  Pulses: Normal pulses  Heart sounds: Murmur heard  Pulmonary:      Effort: No respiratory distress  Breath sounds: Normal breath sounds  No wheezing or rales  Abdominal:      General: Bowel sounds are normal  There is no distension  Palpations: Abdomen is soft  Tenderness: There is no abdominal tenderness  Musculoskeletal:         General: No swelling or tenderness  Skin:     General: Skin is warm and dry  Findings: No erythema or rash  Neurological:      Mental Status: He is alert  Mental status is at baseline     Psychiatric:         Attention and Perception: Attention normal       Comments: frustrated          Additional Data:     Labs:  Results from last 7 days   Lab Units 04/28/23  0522   WBC Thousand/uL 11 59*   HEMOGLOBIN g/dL 15 9   HEMATOCRIT % 46 2   PLATELETS Thousands/uL 294     Results from last 7 days   Lab Units 04/27/23  0517   SODIUM mmol/L 135   POTASSIUM mmol/L 3 3*   CHLORIDE mmol/L 105   CO2 mmol/L 26   BUN mg/dL 29*   CREATININE mg/dL 0 99   ANION GAP mmol/L 4   CALCIUM mg/dL 9 1   GLUCOSE RANDOM mg/dL 109     Results from last 7 days   Lab Units 04/26/23  1540   INR  1 15     Results from last 7 days   Lab Units 04/28/23  0615 04/27/23  2053 04/27/23  1642 04/27/23  1059 04/27/23  0604 04/26/23  2233 04/26/23  1522   POC GLUCOSE mg/dl 101 91 107 109 108 100 125     Results from last 7 days   Lab Units 04/27/23  0517   HEMOGLOBIN A1C % 5 8*     Results from last 7 days   Lab Units 04/28/23  0522 04/27/23  0517   PROCALCITONIN ng/ml <0 05 <0 05       Lines/Drains:  Invasive Devices     Peripheral Intravenous Line  Duration           Peripheral IV 04/27/23 Right Antecubital 1 day                  Telemetry:  Telemetry Orders (From admission, onward)             48 Hour Telemetry Monitoring  Continuous x 48 hours        Expiring   References:    Telemetry Guidelines   Question:  Reason for 48 Hour Telemetry  Answer:  Arrhythmias Requiring Medical Therapy (eg  SVT, Vtach/fib, Bradycardia, Uncontrolled A-fib)                 Telemetry Reviewed: Normal Sinus Rhythm  Indication for Continued Telemetry Use: Acute CVA             Imaging: Reviewed radiology reports from this admission including: CT head and MRI brain    Recent Cultures (last 7 days):         Last 24 Hours Medication List:   Current Facility-Administered Medications   Medication Dose Route Frequency Provider Last Rate   • acetaminophen  650 mg Oral Q6H PRN Natalia Le MD     • amLODIPine  10 mg Oral Daily Natalia Le MD     • apixaban  5 mg Oral BID Natalia Le MD     • atorvastatin  40 mg Oral Daily Natalia Le MD     • calcium carbonate  1,000 mg Oral Daily PRN Natalia Le MD     • citalopram  20 mg Oral Daily Natalia Le MD     • docusate sodium  100 mg Oral BID Natalia Le MD     • donepezil  5 mg Oral BID Natalia Le MD     • hydrALAZINE  25 mg Oral TID Natalia Le MD     • hydrochlorothiazide  25 mg Oral Daily ERIC Og     • insulin lispro  1-6 Units Subcutaneous TID AC Natalia Le MD     • insulin lispro  1-6 Units Subcutaneous HS Natalia Le MD     • losartan  50 mg Oral Daily Natalia Le MD     • metoprolol succinate  100 mg Oral Daily Natalia Le MD     • nicotine  14 mg Transdermal Daily ERIC Og     • ondansetron  4 mg Intravenous Q6H PRN Natalia Le MD     • pantoprazole  20 mg Oral Early Morning Natalia Le MD     • senna  2 tablet Oral Daily PRN Natalia Le MD     • tamsulosin  0 4 mg Oral Daily With Daly Martino MD     • ticagrelor  90 mg Oral Q12H [address] [facility], DO          Today, Patient Was Seen By: ERIC Cruz    **Please Note: This note may have been constructed using a voice recognition system  **

## 2023-04-28 NOTE — RESTORATIVE TECHNICIAN NOTE
Restorative Technician Note      Patient Name: Boubacar Navarrete     Note Type: Mobility  Patient Position Upon Consult: Supine  Activity Performed: Ambulated; Dangled; Stood  Assistive Device: Other (Comment) (none)  Education Provided: Yes  Patient Position at End of Consult: Bedside chair;  All needs within reach; Bed/Chair alarm activated  Warner REYNA, Restorative Technician, [employer]

## 2023-04-28 NOTE — PLAN OF CARE
Problem: PHYSICAL THERAPY ADULT  Goal: Performs mobility at highest level of function for planned discharge setting  See evaluation for individualized goals  Description:    Equipment Recommended:  (TBD)       See flowsheet documentation for full assessment, interventions and recommendations  Outcome: Progressing  Note: Prognosis: Good  Problem List: Decreased endurance, Impaired balance, Decreased mobility, Decreased coordination, Impaired judgement, Decreased safety awareness, Impaired vision, Obesity  Assessment: Pt seen for PT treatment session w/ focus on t/f training, gait training, + stair training  Pt demonstrated improvements this session w/ decreased assistance required for t/f  Pt initially CGA for ambulation but required increased assistance w/ increased distance/fatigue, cues for pacing + obstacle negotiation required  Recommend acute medical rehab upon d/c to address impairments in activity tolerance, balance, + cognition/safety awareness  Pt + pt's spouse in agreement w/ recommendation  PT Discharge Recommendation: Post acute rehabilitation services (acute medical rehab)    See flowsheet documentation for full assessment

## 2023-04-29 LAB
ANION GAP SERPL CALCULATED.3IONS-SCNC: 4 MMOL/L (ref 4–13)
BUN SERPL-MCNC: 29 MG/DL (ref 5–25)
CALCIUM SERPL-MCNC: 9.5 MG/DL (ref 8.3–10.1)
CHLORIDE SERPL-SCNC: 101 MMOL/L (ref 96–108)
CO2 SERPL-SCNC: 28 MMOL/L (ref 21–32)
CREAT SERPL-MCNC: 1.1 MG/DL (ref 0.6–1.3)
GFR SERPL CREATININE-BSD FRML MDRD: 70 ML/MIN/1.73SQ M
GLUCOSE SERPL-MCNC: 103 MG/DL (ref 65–140)
GLUCOSE SERPL-MCNC: 106 MG/DL (ref 65–140)
GLUCOSE SERPL-MCNC: 106 MG/DL (ref 65–140)
GLUCOSE SERPL-MCNC: 108 MG/DL (ref 65–140)
GLUCOSE SERPL-MCNC: 88 MG/DL (ref 65–140)
POTASSIUM SERPL-SCNC: 3.4 MMOL/L (ref 3.5–5.3)
SODIUM SERPL-SCNC: 133 MMOL/L (ref 135–147)

## 2023-04-29 RX ORDER — POTASSIUM CHLORIDE 20 MEQ/1
20 TABLET, EXTENDED RELEASE ORAL ONCE
Status: COMPLETED | OUTPATIENT
Start: 2023-04-29 | End: 2023-04-29

## 2023-04-29 RX ADMIN — TAMSULOSIN HYDROCHLORIDE 0.4 MG: 0.4 CAPSULE ORAL at 17:00

## 2023-04-29 RX ADMIN — LOSARTAN POTASSIUM 50 MG: 50 TABLET, FILM COATED ORAL at 08:05

## 2023-04-29 RX ADMIN — AMLODIPINE BESYLATE 10 MG: 10 TABLET ORAL at 08:05

## 2023-04-29 RX ADMIN — CITALOPRAM HYDROBROMIDE 20 MG: 20 TABLET ORAL at 08:05

## 2023-04-29 RX ADMIN — TICAGRELOR 90 MG: 90 TABLET ORAL at 08:06

## 2023-04-29 RX ADMIN — METOPROLOL SUCCINATE 100 MG: 100 TABLET, EXTENDED RELEASE ORAL at 08:05

## 2023-04-29 RX ADMIN — DONEPEZIL HYDROCHLORIDE 5 MG: 5 TABLET ORAL at 08:05

## 2023-04-29 RX ADMIN — APIXABAN 5 MG: 5 TABLET, FILM COATED ORAL at 17:00

## 2023-04-29 RX ADMIN — APIXABAN 5 MG: 5 TABLET, FILM COATED ORAL at 08:05

## 2023-04-29 RX ADMIN — TICAGRELOR 90 MG: 90 TABLET ORAL at 21:31

## 2023-04-29 RX ADMIN — POTASSIUM CHLORIDE 20 MEQ: 1500 TABLET, EXTENDED RELEASE ORAL at 09:22

## 2023-04-29 RX ADMIN — HYDROCHLOROTHIAZIDE 25 MG: 25 TABLET ORAL at 08:05

## 2023-04-29 RX ADMIN — HYDRALAZINE HYDROCHLORIDE 25 MG: 25 TABLET, FILM COATED ORAL at 08:05

## 2023-04-29 RX ADMIN — HYDRALAZINE HYDROCHLORIDE 25 MG: 25 TABLET, FILM COATED ORAL at 15:19

## 2023-04-29 RX ADMIN — DONEPEZIL HYDROCHLORIDE 5 MG: 5 TABLET ORAL at 17:00

## 2023-04-29 RX ADMIN — HYDRALAZINE HYDROCHLORIDE 25 MG: 25 TABLET, FILM COATED ORAL at 21:31

## 2023-04-29 RX ADMIN — PANTOPRAZOLE SODIUM 20 MG: 20 TABLET, DELAYED RELEASE ORAL at 05:12

## 2023-04-29 RX ADMIN — DOCUSATE SODIUM 100 MG: 100 CAPSULE, LIQUID FILLED ORAL at 08:05

## 2023-04-29 RX ADMIN — ATORVASTATIN CALCIUM 40 MG: 40 TABLET, FILM COATED ORAL at 08:05

## 2023-04-29 NOTE — PLAN OF CARE
Problem: PAIN - ADULT  Goal: Verbalizes/displays adequate comfort level or baseline comfort level  Description: Interventions:  - Encourage patient to monitor pain and request assistance  - Assess pain using appropriate pain scale  - Administer analgesics based on type and severity of pain and evaluate response  - Implement non-pharmacological measures as appropriate and evaluate response  - Consider cultural and social influences on pain and pain management  - Notify physician/advanced practitioner if interventions unsuccessful or patient reports new pain  Outcome: Progressing     Problem: INFECTION - ADULT  Goal: Absence or prevention of progression during hospitalization  Description: INTERVENTIONS:  - Assess and monitor for signs and symptoms of infection  - Monitor lab/diagnostic results  - Monitor all insertion sites, i e  indwelling lines, tubes, and drains  - Monitor endotracheal if appropriate and nasal secretions for changes in amount and color  - [place] appropriate cooling/warming therapies per order  - Administer medications as ordered  - Instruct and encourage patient and family to use good hand hygiene technique  - Identify and instruct in appropriate isolation precautions for identified infection/condition  Outcome: Progressing     Problem: SAFETY ADULT  Goal: Patient will remain free of falls  Description: INTERVENTIONS:  - Educate patient/family on patient safety including physical limitations  - Instruct patient to call for assistance with activity   - Consult OT/PT to assist with strengthening/mobility   - Keep Call bell within reach  - Keep bed low and locked with side rails adjusted as appropriate  - Keep care items and personal belongings within reach  - Initiate and maintain comfort rounds  - Make Fall Risk Sign visible to staff  - Apply yellow socks and bracelet for high fall risk patients  - Consider moving patient to room near nurses station  Outcome: Progressing     Problem: DISCHARGE PLANNING  Goal: Discharge to home or other facility with appropriate resources  Description: INTERVENTIONS:  - Identify barriers to discharge w/patient and caregiver  - Arrange for needed discharge resources and transportation as appropriate  - Identify discharge learning needs (meds, wound care, etc )  - Arrange for interpretive services to assist at discharge as needed  - Refer to Case Management Department for coordinating discharge planning if the patient needs post-hospital services based on physician/advanced practitioner order or complex needs related to functional status, cognitive ability, or social support system  Outcome: Progressing     Problem: Knowledge Deficit  Goal: Patient/family/caregiver demonstrates understanding of disease process, treatment plan, medications, and discharge instructions  Description: Complete learning assessment and assess knowledge base  Interventions:  - Provide teaching at level of understanding  - Provide teaching via preferred learning methods  Outcome: Progressing     Problem: NEUROSENSORY - ADULT  Goal: Achieves stable or improved neurological status  Description: INTERVENTIONS  - Monitor and report changes in neurological status  - Monitor vital signs such as temperature, blood pressure, glucose, and any other labs ordered   - Initiate measures to prevent increased intracranial pressure  - Monitor for seizure activity and implement precautions if appropriate      Outcome: Progressing  Goal: Achieves maximal functionality and self care  Description: INTERVENTIONS  - Monitor swallowing and airway patency with patient fatigue and changes in neurological status  - Encourage and assist patient to increase activity and self care     - Encourage visually impaired, hearing impaired and aphasic patients to use assistive/communication devices  Outcome: Progressing     Problem: CARDIOVASCULAR - ADULT  Goal: Maintains optimal cardiac output and hemodynamic stability  Description: INTERVENTIONS:  - Monitor I/O, vital signs and rhythm  - Monitor for S/S and trends of decreased cardiac output  - Administer and titrate ordered vasoactive medications to optimize hemodynamic stability  - Assess quality of pulses, skin color and temperature  - Assess for signs of decreased coronary artery perfusion  - Instruct patient to report change in severity of symptoms  Outcome: Progressing  Goal: Absence of cardiac dysrhythmias or at baseline rhythm  Description: INTERVENTIONS:  - Continuous cardiac monitoring, vital signs, obtain 12 lead EKG if ordered  - Administer antiarrhythmic and heart rate control medications as ordered  - Monitor electrolytes and administer replacement therapy as ordered  Outcome: Progressing     Problem: METABOLIC, FLUID AND ELECTROLYTES - ADULT  Goal: Electrolytes maintained within normal limits  Description: INTERVENTIONS:  - Monitor labs and assess patient for signs and symptoms of electrolyte imbalances  - Administer electrolyte replacement as ordered  - Monitor response to electrolyte replacements, including repeat lab results as appropriate  - Instruct patient on fluid and nutrition as appropriate  Outcome: Progressing  Goal: Glucose maintained within target range  Description: INTERVENTIONS:  - Monitor Blood Glucose as ordered  - Assess for signs and symptoms of hyperglycemia and hypoglycemia  - Administer ordered medications to maintain glucose within target range  - Assess nutritional intake and initiate nutrition service referral as needed  Outcome: Progressing     Problem: MOBILITY - ADULT  Goal: Maintain or return to baseline ADL function  Description: INTERVENTIONS:  -  Assess patient's ability to carry out ADLs; assess patient's baseline for ADL function and identify physical deficits which impact ability to perform ADLs (bathing, care of mouth/teeth, toileting, grooming, dressing, etc )  - Assess/evaluate cause of self-care deficits   - Assess range of motion  - Assess patient's mobility; develop plan if impaired  - Assess patient's need for assistive devices and provide as appropriate  - Encourage maximum independence but intervene and supervise when necessary  - Involve family in performance of ADLs  - Assess for home care needs following discharge   - Consider OT consult to assist with ADL evaluation and planning for discharge  - Provide patient education as appropriate  Outcome: Progressing  Goal: Maintains/Returns to pre admission functional level  Description: INTERVENTIONS:  - Perform BMAT or MOVE assessment daily    - Set and communicate daily mobility goal to care team and patient/family/caregiver     - Collaborate with rehabilitation services on mobility goals if consulted  - Out of bed for toileting  - Record patient progress and toleration of activity level   Outcome: Progressing

## 2023-04-29 NOTE — CASE MANAGEMENT
Case Management Discharge Planning Note    Patient name Valley Card  [address] 126-84 MRN [id number]  : 1960 Date 2023       Current Admission Date: 2023  Current Admission Diagnosis:Stroke [facility])   Patient Active Problem List    Diagnosis Date Noted   • Prediabetes 2023   • Hypokalemia 2023   • Leukocytosis 2023   • Chronic anticoagulation 2023   • Stroke ([facility] [address] ) 2023   • Hyponatremia 2023   • Middle cerebral artery stenosis, right 2023   • Left posterior MCA stroke - etiology unclear at this time 2023   • Chronic low back pain 2023   • Hypertensive encephalopathy, transient 2023   • Snoring 08/10/2020   • Memory difficulties 08/10/2020   • Chronic ischemic right MCA stroke 2019   • Status post placement of implantable loop recorder 2019   • Adjustment reaction with anxiety 2019   • Insomnia 2019   • Fall 2019   • 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 ([facility] [address] ) 2019   • Constipation 2019   • Urinary retention 2019   • Hypertriglyceridemia 2019   • Nicotine dependence 2019   • Stenosis of right carotid artery 2019   • Presbyopia 2019   • History of stroke 2019   • Headache 2019   • Primary hypertension 2019   • GERD (gastroesophageal reflux disease) 2019      LOS (days): 3  Geometric Mean LOS (GMLOS) (days): 2 90  Days to GMLOS:0 2     OBJECTIVE:  Risk of Unplanned Readmission Score: 18 82         Current admission status: Inpatient   Preferred Pharmacy:   [facility] [address] [facility], [address] - [address]  [address] [address]  Phone: [phone] Fax: [facility], [address] [facility]  [address] [address]  Phone: [phone] Fax: [phone]    Primary Care Provider: ERIC Neely    Primary Insurance: [facility]  Secondary Insurance: BLUE CROSS    DISCHARGE DETAILS:        Additional Comments: RAYNA GLASS made aware by provider that pt is clearedfor d/c  RAYNA GLASS spoke to Gin from [facility] who stated that pt is accepted and insurance was still pending

## 2023-04-29 NOTE — PROGRESS NOTES
[address] [facility]  Progress Note  Name: Wang Dwyer  MRN: [id number]  Unit/Bed#: PPHP 703-01 I Date of Admission: 4/26/2023   Date of Service: 4/29/2023 I Hospital Day: 3    Assessment/Plan   * Stroke [facility])  Assessment & Plan  Pt presented to the ER as a stroke alert with history of known severe R M1 stenosis, prior tobacco use, recent L MCA stroke (had aphasia) 04/16/2023 came in as a Stroke alert on 4/26/2023  NIHSS of 2  Initial presenting deficits were L facial droop, R sided weakness, and AMS   As a result of past stroke within the past month and on eliquis pt was determined to not be a candidate for thrombolysis   · 4/26/2023 CTA of the head and neck-Stable moderate (60 to 65%) stenosis in the bilateral internal carotid arteries  Stable severe stenosis distal right M1 segment  Stable severe stenosis proximal left M2  No new intracranial stenosis or large vessel occlusion  · MRI brain 4/27- Compared to the prior recent MRI examination dated 4/17/2023, interval increase in size of diffusion abnormality in the left parietal subcortical and periventricular white matter consistent with acute to subacute ischemia  New small focus of gyral diffusion abnormality in the right frontal parietal region  Persistent additional small foci of diffusion abnormality in the left periatrial and parieto-occipital region  No acute hemorrhage or midline shift  Stable moderate chronic microangiopathic changes within the brain  · Stroke pathway  · Allow for permissive hypertension  · A1c 5 8 lipid panel  · Started on Brilinta 4/27  · Continue with Eliquis 5 mg twice daily  · No need for repeat echocardiogram at this time as echo was recently completed on previous admission 4/18/2023  · Echo-EF 55%  Systolic function is normal  Wall motion is normal   Grade 1 diastolic dysfunction left and right atrium mildly dilated  Mild aortic valve stenosis    Mild mitral and tricuspid valve regurgitation  · No arrythmias on tele- d/c    · Neurology following  · PT/OT recommending acute rehab at discharge     Leukocytosis  Assessment & Plan  · Noted on admission with wbc 12 38--> 11 01 --> 11 59  · chest xray negative   · procal levels negative   · Trend wbcs   · Covid/flu/rsv negative     Hypokalemia  Assessment & Plan  · Replete    Prediabetes  Assessment & Plan  · a1c 5 8  · Currently holding metformin while hospitalized     Hyponatremia  Assessment & Plan  · Likely due to poor oral intake and HCTZ use  · HCTZ resumed    Chronic low back pain  Assessment & Plan  · Reports chronic lower back pain for 4 weeks which has not improved despite multiple trails of steroids, nsaids and muscle relaxants- currently denies any back pain at this time   · XR of lumbar spine ordered by PCP: No lumbar vertebral body fracture or subluxation  Multilevel degenerative changes  · PRN tylenol      Chronic ischemic right MCA stroke  Assessment & Plan  · Continue aspirin, Eliquis and statin  · PT/OT/ST  · Supportive care    Adjustment reaction with anxiety  Assessment & Plan  · Continue celexa   · Provide supportive care     Nicotine dependence  Assessment & Plan  · Encourage cessation   · Added nicoderm patch     Primary hypertension  Assessment & Plan  · Per wife and his BP records, his BP has been uncontrolled  · Compliance has not been ideal until she started managing his medications  · Continue with metoprolol 100mg daily, losartan 50mg daily, hydralazine 25mg TID and amlodipine 10mg daily, HCTZ 25mg on 4/27 - monitor Bps and adjust PRN  · Low Na diet             VTE Pharmacologic Prophylaxis: VTE Score: 3 Moderate Risk (Score 3-4) - Pharmacological DVT Prophylaxis Ordered: apixaban (Eliquis)  Patient Centered Rounds: I performed bedside rounds with nursing staff today    Discussions with Specialists or Other Care Team Provider: d/w RN d/w CM     Education and Discussions with Family / Patient: Updated contact person (wife) via phone  Total Time Spent on Date of Encounter in care of patient: 35 minutes This time was spent on one or more of the following: performing physical exam; counseling and coordination of care; obtaining or reviewing history; documenting in the medical record; reviewing/ordering tests, medications or procedures; communicating with other healthcare professionals and discussing with patient's family/caregivers  Current Length of Stay: 3 day(s)  Current Patient Status: Inpatient   Certification Statement: The patient will continue to require additional inpatient hospital stay due to pending placement   Discharge Plan: Anticipate discharge in 24-48 hrs to rehab facility  Code Status: Level 1 - Full Code    Subjective:   Pt sitting oob in the chair  Reports that he is feeling much better today, feels mentally clear today and easier to process information today  Pt did report a short episode of dizziness overnight but this resolved spontaneously and hasn't reoccured  Pt denies all other complaints  Objective:     Vitals:   Temp (24hrs), Av 4 °F (36 9 °C), Min:98 2 °F (36 8 °C), Max:98 8 °F (37 1 °C)    Temp:  [98 2 °F (36 8 °C)-98 8 °F (37 1 °C)] 98 2 °F (36 8 °C)  HR:  [67-78] 76  Resp:  [15-18] 15  BP: (121-138)/(51-64) 135/62  SpO2:  [94 %-98 %] 98 %  Body mass index is 34 53 kg/m²  Input and Output Summary (last 24 hours):   No intake or output data in the 24 hours ending 23 1256    Physical Exam:   Physical Exam  Vitals and nursing note reviewed  Constitutional:       General: He is not in acute distress  Appearance: He is obese  He is not ill-appearing  HENT:      Head: Normocephalic and atraumatic  Eyes:      General: No scleral icterus  Conjunctiva/sclera: Conjunctivae normal       Pupils: Pupils are equal, round, and reactive to light  Cardiovascular:      Rate and Rhythm: Normal rate and regular rhythm  Heart sounds: Murmur heard     Pulmonary: Effort: Pulmonary effort is normal  No respiratory distress  Breath sounds: Normal breath sounds  No wheezing or rales  Abdominal:      General: Bowel sounds are normal  There is no distension  Palpations: Abdomen is soft  Tenderness: There is no abdominal tenderness  Musculoskeletal:         General: No swelling or tenderness  Skin:     General: Skin is warm and dry  Neurological:      Mental Status: He is alert  Mental status is at baseline  Cranial Nerves: No cranial nerve deficit     Psychiatric:         Mood and Affect: Mood normal          Additional Data:     Labs:  Results from last 7 days   Lab Units 04/28/23  0522   WBC Thousand/uL 11 59*   HEMOGLOBIN g/dL 15 9   HEMATOCRIT % 46 2   PLATELETS Thousands/uL 294     Results from last 7 days   Lab Units 04/29/23  0510   SODIUM mmol/L 133*   POTASSIUM mmol/L 3 4*   CHLORIDE mmol/L 101   CO2 mmol/L 28   BUN mg/dL 29*   CREATININE mg/dL 1 10   ANION GAP mmol/L 4   CALCIUM mg/dL 9 5   GLUCOSE RANDOM mg/dL 106     Results from last 7 days   Lab Units 04/26/23  1540   INR  1 15     Results from last 7 days   Lab Units 04/29/23  1046 04/29/23  0642 04/28/23  2153 04/28/23  2024 04/28/23  1620 04/28/23  1049 04/28/23  0615 04/27/23  2053 04/27/23  1642 04/27/23  1059 04/27/23  0604 04/26/23  2233   POC GLUCOSE mg/dl 106 103 99 80 150* 108 101 91 107 109 108 100     Results from last 7 days   Lab Units 04/27/23  0517   HEMOGLOBIN A1C % 5 8*     Results from last 7 days   Lab Units 04/28/23  0522 04/27/23  0517   PROCALCITONIN ng/ml <0 05 <0 05       Lines/Drains:  Invasive Devices     Peripheral Intravenous Line  Duration           Peripheral IV 04/27/23 Right Antecubital 2 days                  Telemetry:  Telemetry Orders (From admission, onward)             48 Hour Telemetry Monitoring  Continuous x 48 hours        References:    Telemetry Guidelines   Question:  Reason for 48 Hour Telemetry  Answer:  Acute CVA (<24 hrs old, hemispheric strokes, selected brainstem strokes, cardiac arrhythmias)                    Indication for Continued Telemetry Use: No indication for continued use  Will discontinue  Imaging: Reviewed radiology reports from this admission including: chest xray and MRI brain    Recent Cultures (last 7 days):         Last 24 Hours Medication List:   Current Facility-Administered Medications   Medication Dose Route Frequency Provider Last Rate   • acetaminophen  650 mg Oral Q6H PRN Dru Sapp MD     • amLODIPine  10 mg Oral Daily Dru Sapp MD     • apixaban  5 mg Oral BID Dru Sapp MD     • atorvastatin  40 mg Oral Daily Dru Sapp MD     • calcium carbonate  1,000 mg Oral Daily PRN Dru Sapp MD     • citalopram  20 mg Oral Daily Dru Sapp MD     • docusate sodium  100 mg Oral BID Dru Sapp MD     • donepezil  5 mg Oral BID Dru Sapp MD     • hydrALAZINE  25 mg Oral TID Dru Sapp MD     • hydrochlorothiazide  25 mg Oral Daily ERIC Velasco     • insulin lispro  1-6 Units Subcutaneous TID AC Dru Sapp MD     • insulin lispro  1-6 Units Subcutaneous HS Dru Sapp MD     • losartan  50 mg Oral Daily Dru Sapp MD     • metoprolol succinate  100 mg Oral Daily Dru Sapp MD     • nicotine  14 mg Transdermal Daily ERIC Velasco     • ondansetron  4 mg Intravenous Q6H PRN Dru Sapp MD     • pantoprazole  20 mg Oral Early Morning Dru Sapp MD     • senna  2 tablet Oral Daily PRN Dru Sapp MD     • tamsulosin  0 4 mg Oral Daily With Nataliia Luo MD     • ticagrelor  90 mg Oral Q12H [address] [facility],           Today, Patient Was Seen By: ERIC Velasco    **Please Note: This note may have been constructed using a voice recognition system  **

## 2023-04-29 NOTE — ASSESSMENT & PLAN NOTE
· Noted on admission with wbc 12 38--> 11 01 --> 11 59  · chest xray negative   · procal levels negative   · Trend wbcs   · Covid/flu/rsv negative

## 2023-04-29 NOTE — ASSESSMENT & PLAN NOTE
Pt presented to the ER as a stroke alert with history of known severe R M1 stenosis, prior tobacco use, recent L MCA stroke (had aphasia) 04/16/2023 came in as a Stroke alert on 4/26/2023  NIHSS of 2  Initial presenting deficits were L facial droop, R sided weakness, and AMS   As a result of past stroke within the past month and on eliquis pt was determined to not be a candidate for thrombolysis   · 4/26/2023 CTA of the head and neck-Stable moderate (60 to 65%) stenosis in the bilateral internal carotid arteries  Stable severe stenosis distal right M1 segment  Stable severe stenosis proximal left M2  No new intracranial stenosis or large vessel occlusion  · MRI brain 4/27- Compared to the prior recent MRI examination dated 4/17/2023, interval increase in size of diffusion abnormality in the left parietal subcortical and periventricular white matter consistent with acute to subacute ischemia  New small focus of gyral diffusion abnormality in the right frontal parietal region  Persistent additional small foci of diffusion abnormality in the left periatrial and parieto-occipital region  No acute hemorrhage or midline shift  Stable moderate chronic microangiopathic changes within the brain  · Stroke pathway  · Allow for permissive hypertension  · A1c 5 8 lipid panel  · Started on Brilinta 4/27  · Continue with Eliquis 5 mg twice daily  · No need for repeat echocardiogram at this time as echo was recently completed on previous admission 4/18/2023  · Echo-EF 55%  Systolic function is normal  Wall motion is normal   Grade 1 diastolic dysfunction left and right atrium mildly dilated  Mild aortic valve stenosis    Mild mitral and tricuspid valve regurgitation  · No arrythmias on tele- d/c    · Neurology following  · PT/OT recommending acute rehab at discharge

## 2023-04-29 NOTE — ASSESSMENT & PLAN NOTE
· Per wife and his BP records, his BP has been uncontrolled  · Compliance has not been ideal until she started managing his medications    · Continue with metoprolol 100mg daily, losartan 50mg daily, hydralazine 25mg TID and amlodipine 10mg daily, HCTZ 25mg on 4/27 - monitor Bps and adjust PRN  · Low Na diet

## 2023-04-30 LAB
GLUCOSE SERPL-MCNC: 107 MG/DL (ref 65–140)
GLUCOSE SERPL-MCNC: 109 MG/DL (ref 65–140)
GLUCOSE SERPL-MCNC: 128 MG/DL (ref 65–140)
GLUCOSE SERPL-MCNC: 130 MG/DL (ref 65–140)

## 2023-04-30 RX ORDER — DOCUSATE SODIUM 100 MG/1
100 CAPSULE, LIQUID FILLED ORAL 2 TIMES DAILY PRN
Status: DISCONTINUED | OUTPATIENT
Start: 2023-04-30 | End: 2023-05-02

## 2023-04-30 RX ORDER — LIDOCAINE 50 MG/G
1 PATCH TOPICAL DAILY
Status: DISCONTINUED | OUTPATIENT
Start: 2023-04-30 | End: 2023-05-12 | Stop reason: HOSPADM

## 2023-04-30 RX ORDER — SENNOSIDES 8.6 MG
2 TABLET ORAL DAILY PRN
Status: DISCONTINUED | OUTPATIENT
Start: 2023-04-30 | End: 2023-05-12 | Stop reason: HOSPADM

## 2023-04-30 RX ORDER — ACETAMINOPHEN 325 MG/1
975 TABLET ORAL EVERY 6 HOURS PRN
Status: DISCONTINUED | OUTPATIENT
Start: 2023-04-30 | End: 2023-05-02

## 2023-04-30 RX ADMIN — HYDRALAZINE HYDROCHLORIDE 25 MG: 25 TABLET, FILM COATED ORAL at 15:42

## 2023-04-30 RX ADMIN — LIDOCAINE 5% 1 PATCH: 700 PATCH TOPICAL at 09:28

## 2023-04-30 RX ADMIN — ATORVASTATIN CALCIUM 40 MG: 40 TABLET, FILM COATED ORAL at 08:07

## 2023-04-30 RX ADMIN — HYDROCHLOROTHIAZIDE 25 MG: 25 TABLET ORAL at 08:07

## 2023-04-30 RX ADMIN — HYDRALAZINE HYDROCHLORIDE 25 MG: 25 TABLET, FILM COATED ORAL at 08:07

## 2023-04-30 RX ADMIN — DONEPEZIL HYDROCHLORIDE 5 MG: 5 TABLET ORAL at 17:34

## 2023-04-30 RX ADMIN — PANTOPRAZOLE SODIUM 20 MG: 20 TABLET, DELAYED RELEASE ORAL at 05:20

## 2023-04-30 RX ADMIN — TICAGRELOR 90 MG: 90 TABLET ORAL at 21:09

## 2023-04-30 RX ADMIN — TAMSULOSIN HYDROCHLORIDE 0.4 MG: 0.4 CAPSULE ORAL at 15:42

## 2023-04-30 RX ADMIN — METOPROLOL SUCCINATE 100 MG: 100 TABLET, EXTENDED RELEASE ORAL at 08:07

## 2023-04-30 RX ADMIN — DONEPEZIL HYDROCHLORIDE 5 MG: 5 TABLET ORAL at 08:07

## 2023-04-30 RX ADMIN — TICAGRELOR 90 MG: 90 TABLET ORAL at 08:07

## 2023-04-30 RX ADMIN — HYDRALAZINE HYDROCHLORIDE 25 MG: 25 TABLET, FILM COATED ORAL at 21:09

## 2023-04-30 RX ADMIN — APIXABAN 5 MG: 5 TABLET, FILM COATED ORAL at 08:07

## 2023-04-30 RX ADMIN — APIXABAN 5 MG: 5 TABLET, FILM COATED ORAL at 17:34

## 2023-04-30 RX ADMIN — CITALOPRAM HYDROBROMIDE 20 MG: 20 TABLET ORAL at 08:07

## 2023-04-30 RX ADMIN — AMLODIPINE BESYLATE 10 MG: 10 TABLET ORAL at 08:07

## 2023-04-30 RX ADMIN — LOSARTAN POTASSIUM 50 MG: 50 TABLET, FILM COATED ORAL at 08:07

## 2023-04-30 NOTE — ASSESSMENT & PLAN NOTE
Pt presented to the ER as a stroke alert with history of known severe R M1 stenosis, prior tobacco use, recent L MCA stroke (had aphasia) 04/16/2023 came in as a Stroke alert on 4/26/2023  NIHSS of 2  Initial presenting deficits were L facial droop, R sided weakness, and AMS   As a result of past stroke within the past month and on eliquis pt was determined to not be a candidate for thrombolysis   · 4/26/2023 CTA of the head and neck-Stable moderate (60 to 65%) stenosis in the bilateral internal carotid arteries  Stable severe stenosis distal right M1 segment  Stable severe stenosis proximal left M2  No new intracranial stenosis or large vessel occlusion  · MRI brain 4/27- Compared to the prior recent MRI examination dated 4/17/2023, interval increase in size of diffusion abnormality in the left parietal subcortical and periventricular white matter consistent with acute to subacute ischemia  New small focus of gyral diffusion abnormality in the right frontal parietal region  Persistent additional small foci of diffusion abnormality in the left periatrial and parieto-occipital region  No acute hemorrhage or midline shift  Stable moderate chronic microangiopathic changes within the brain  · Stroke pathway  · Allow for permissive hypertension  · A1c 5 8  · Started on Brilinta 4/27  · Continue with Eliquis 5 mg twice daily and statin   · No need for repeat echocardiogram at this time as echo was recently completed on previous admission 4/18/2023  · Echo-EF 55%  Systolic function is normal  Wall motion is normal   Grade 1 diastolic dysfunction left and right atrium mildly dilated  Mild aortic valve stenosis    Mild mitral and tricuspid valve regurgitation  · No arrythmias on tele- d/cd 4/29    · Neurology following  · PT/OT recommending acute rehab at discharge- pending insurance auth

## 2023-04-30 NOTE — ASSESSMENT & PLAN NOTE
· Per wife and his BP records, his BP has been uncontrolled  · Compliance has not been ideal until she started managing his medications    · Continue with metoprolol 100mg daily, losartan 50mg daily, hydralazine 25mg TID and amlodipine 10mg daily, HCTZ 25mg   · monitor Bps and adjust PRN  · Low Na diet

## 2023-04-30 NOTE — PLAN OF CARE
Problem: PAIN - ADULT  Goal: Verbalizes/displays adequate comfort level or baseline comfort level  Description: Interventions:  - Encourage patient to monitor pain and request assistance  - Assess pain using appropriate pain scale  - Administer analgesics based on type and severity of pain and evaluate response  - Implement non-pharmacological measures as appropriate and evaluate response  - Consider cultural and social influences on pain and pain management  - Notify physician/advanced practitioner if interventions unsuccessful or patient reports new pain  Outcome: Progressing     Problem: INFECTION - ADULT  Goal: Absence or prevention of progression during hospitalization  Description: INTERVENTIONS:  - Assess and monitor for signs and symptoms of infection  - Monitor lab/diagnostic results  - Monitor all insertion sites, i e  indwelling lines, tubes, and drains  - Monitor endotracheal if appropriate and nasal secretions for changes in amount and color  - [place] appropriate cooling/warming therapies per order  - Administer medications as ordered  - Instruct and encourage patient and family to use good hand hygiene technique  - Identify and instruct in appropriate isolation precautions for identified infection/condition  Outcome: Progressing     Problem: SAFETY ADULT  Goal: Patient will remain free of falls  Description: INTERVENTIONS:  - Educate patient/family on patient safety including physical limitations  - Instruct patient to call for assistance with activity   - Consult OT/PT to assist with strengthening/mobility   - Keep Call bell within reach  - Keep bed low and locked with side rails adjusted as appropriate  - Keep care items and personal belongings within reach  - Initiate and maintain comfort rounds  - Make Fall Risk Sign visible to staff  - Offer Toileting every  Hours, in advance of need  - Initiate/Maintain alarm  - Obtain necessary fall risk management equipment:   - Apply yellow socks and bracelet for high fall risk patients  - Consider moving patient to room near nurses station  Outcome: Progressing     Problem: DISCHARGE PLANNING  Goal: Discharge to home or other facility with appropriate resources  Description: INTERVENTIONS:  - Identify barriers to discharge w/patient and caregiver  - Arrange for needed discharge resources and transportation as appropriate  - Identify discharge learning needs (meds, wound care, etc )  - Arrange for interpretive services to assist at discharge as needed  - Refer to Case Management Department for coordinating discharge planning if the patient needs post-hospital services based on physician/advanced practitioner order or complex needs related to functional status, cognitive ability, or social support system  Outcome: Progressing     Problem: Knowledge Deficit  Goal: Patient/family/caregiver demonstrates understanding of disease process, treatment plan, medications, and discharge instructions  Description: Complete learning assessment and assess knowledge base  Interventions:  - Provide teaching at level of understanding  - Provide teaching via preferred learning methods  Outcome: Progressing     Problem: NEUROSENSORY - ADULT  Goal: Achieves stable or improved neurological status  Description: INTERVENTIONS  - Monitor and report changes in neurological status  - Monitor vital signs such as temperature, blood pressure, glucose, and any other labs ordered   - Initiate measures to prevent increased intracranial pressure  - Monitor for seizure activity and implement precautions if appropriate      Outcome: Progressing  Goal: Achieves maximal functionality and self care  Description: INTERVENTIONS  - Monitor swallowing and airway patency with patient fatigue and changes in neurological status  - Encourage and assist patient to increase activity and self care     - Encourage visually impaired, hearing impaired and aphasic patients to use assistive/communication devices  Outcome: Progressing     Problem: CARDIOVASCULAR - ADULT  Goal: Maintains optimal cardiac output and hemodynamic stability  Description: INTERVENTIONS:  - Monitor I/O, vital signs and rhythm  - Monitor for S/S and trends of decreased cardiac output  - Administer and titrate ordered vasoactive medications to optimize hemodynamic stability  - Assess quality of pulses, skin color and temperature  - Assess for signs of decreased coronary artery perfusion  - Instruct patient to report change in severity of symptoms  Outcome: Progressing  Goal: Absence of cardiac dysrhythmias or at baseline rhythm  Description: INTERVENTIONS:  - Continuous cardiac monitoring, vital signs, obtain 12 lead EKG if ordered  - Administer antiarrhythmic and heart rate control medications as ordered  - Monitor electrolytes and administer replacement therapy as ordered  Outcome: Progressing     Problem: METABOLIC, FLUID AND ELECTROLYTES - ADULT  Goal: Electrolytes maintained within normal limits  Description: INTERVENTIONS:  - Monitor labs and assess patient for signs and symptoms of electrolyte imbalances  - Administer electrolyte replacement as ordered  - Monitor response to electrolyte replacements, including repeat lab results as appropriate  - Instruct patient on fluid and nutrition as appropriate  Outcome: Progressing  Goal: Glucose maintained within target range  Description: INTERVENTIONS:  - Monitor Blood Glucose as ordered  - Assess for signs and symptoms of hyperglycemia and hypoglycemia  - Administer ordered medications to maintain glucose within target range  - Assess nutritional intake and initiate nutrition service referral as needed  Outcome: Progressing     Problem: MOBILITY - ADULT  Goal: Maintain or return to baseline ADL function  Description: INTERVENTIONS:  -  Assess patient's ability to carry out ADLs; assess patient's baseline for ADL function and identify physical deficits which impact ability to perform ADLs (bathing, care of mouth/teeth, toileting, grooming, dressing, etc )  - Assess/evaluate cause of self-care deficits   - Assess range of motion  - Assess patient's mobility; develop plan if impaired  - Assess patient's need for assistive devices and provide as appropriate  - Encourage maximum independence but intervene and supervise when necessary  - Involve family in performance of ADLs  - Assess for home care needs following discharge   - Consider OT consult to assist with ADL evaluation and planning for discharge  - Provide patient education as appropriate  Outcome: Progressing  Goal: Maintains/Returns to pre admission functional level  Description: INTERVENTIONS:  - Perform BMAT or MOVE assessment daily    - Set and communicate daily mobility goal to care team and patient/family/caregiver  - Collaborate with rehabilitation services on mobility goals if consulted  - Perform Range of Motion  times a day  - Reposition patient every  hours    - Dangle patient  times a day  - Stand patient  times a day  - Ambulate patient  times a day  - Out of bed to chair  times a day   - Out of bed for meals  times a day  - Out of bed for toileting  - Record patient progress and toleration of activity level   Outcome: Progressing

## 2023-04-30 NOTE — PROGRESS NOTES
[address] [facility]  Progress Note  Name: Ayo Gaitan  MRN: [id number]  Unit/Bed#: [facility] 703-01 I Date of Admission: 4/26/2023   Date of Service: 4/30/2023 I Hospital Day: 4    Assessment/Plan   * Stroke [facility])  Assessment & Plan  Pt presented to the ER as a stroke alert with history of known severe R M1 stenosis, prior tobacco use, recent L MCA stroke (had aphasia) 04/16/2023 came in as a Stroke alert on 4/26/2023  NIHSS of 2  Initial presenting deficits were L facial droop, R sided weakness, and AMS   As a result of past stroke within the past month and on eliquis pt was determined to not be a candidate for thrombolysis   · 4/26/2023 CTA of the head and neck-Stable moderate (60 to 65%) stenosis in the bilateral internal carotid arteries  Stable severe stenosis distal right M1 segment  Stable severe stenosis proximal left M2  No new intracranial stenosis or large vessel occlusion  · MRI brain 4/27- Compared to the prior recent MRI examination dated 4/17/2023, interval increase in size of diffusion abnormality in the left parietal subcortical and periventricular white matter consistent with acute to subacute ischemia  New small focus of gyral diffusion abnormality in the right frontal parietal region  Persistent additional small foci of diffusion abnormality in the left periatrial and parieto-occipital region  No acute hemorrhage or midline shift  Stable moderate chronic microangiopathic changes within the brain  · Stroke pathway  · Allow for permissive hypertension  · A1c 5 8  · Started on Brilinta 4/27  · Continue with Eliquis 5 mg twice daily and statin   · No need for repeat echocardiogram at this time as echo was recently completed on previous admission 4/18/2023  · Echo-EF 55%  Systolic function is normal  Wall motion is normal   Grade 1 diastolic dysfunction left and right atrium mildly dilated  Mild aortic valve stenosis    Mild mitral and tricuspid valve regurgitation  · No arrythmias on tele- d/cd 4/29    · Neurology following  · PT/OT recommending acute rehab at discharge- pending insurance auth     Leukocytosis  Assessment & Plan  · Noted on admission with wbc 12 38--> 11 01 --> 11 59  · chest xray negative   · procal levels negative   · Trend wbcs   · Covid/flu/rsv negative     Hypokalemia  Assessment & Plan  · Repleted  · Repeat am bmp     Prediabetes  Assessment & Plan  · a1c 5 8  · Currently holding metformin while hospitalized     Hyponatremia  Assessment & Plan  · Likely due to poor oral intake and HCTZ use  · HCTZ resumed  · Check am bmp     Chronic low back pain  Assessment & Plan  · Reports chronic lower back pain for 4 weeks which has not improved despite multiple trails of steroids, nsaids and muscle relaxants- pt was denying back pain for several days but now reporting that his low back is bother him today  · XR of lumbar spine ordered by PCP: No lumbar vertebral body fracture or subluxation  Multilevel degenerative changes  · PRN tylenol, add lidocaine patch   · Pt reports muscle relaxants haven't helped in the past      Chronic ischemic right MCA stroke  Assessment & Plan  · Continue brilinta, Eliquis and statin  · PT/OT/ST- recommending acute rehab- pending auth   · Supportive care    Adjustment reaction with anxiety  Assessment & Plan  · Continue celexa   · Provide supportive care     Nicotine dependence  Assessment & Plan  · Encourage cessation   · Added nicoderm patch     Primary hypertension  Assessment & Plan  · Per wife and his BP records, his BP has been uncontrolled  · Compliance has not been ideal until she started managing his medications    · Continue with metoprolol 100mg daily, losartan 50mg daily, hydralazine 25mg TID and amlodipine 10mg daily, HCTZ 25mg   · monitor Bps and adjust PRN  · Low Na diet             VTE Pharmacologic Prophylaxis: VTE Score: 3 Moderate Risk (Score 3-4) - Pharmacological DVT Prophylaxis Ordered: apixaban (Eliquis)  Patient Centered Rounds: I performed bedside rounds with nursing staff today  Discussions with Specialists or Other Care Team Provider: d/w RN     Education and Discussions with Family / Patient: Attempted to update  (wife) via phone  Left voicemail  Total Time Spent on Date of Encounter in care of patient: 35 minutes This time was spent on one or more of the following: performing physical exam; counseling and coordination of care; obtaining or reviewing history; documenting in the medical record; reviewing/ordering tests, medications or procedures; communicating with other healthcare professionals and discussing with patient's family/caregivers  Current Length of Stay: 4 day(s)  Current Patient Status: Inpatient   Certification Statement: The patient will continue to require additional inpatient hospital stay due to pendign rehab authorization   Discharge Plan: Anticipate discharge tomorrow to rehab facility  Code Status: Level 1 - Full Code    Subjective:   Patient sitting out of bed in the chair  He reports that he feels even better today than he did yesterday  Feels that he is able to process better and mentally more aware  Patient reports that his stools have been loose but he attributes that to the bowel regimen that he is on  Pt reports his low back is hurting him today, denies radiation into the legs  Patient denies all other complaints  Objective:     Vitals:   Temp (24hrs), Av 2 °F (36 8 °C), Min:98 °F (36 7 °C), Max:98 4 °F (36 9 °C)    Temp:  [98 °F (36 7 °C)-98 4 °F (36 9 °C)] 98 °F (36 7 °C)  HR:  [66-76] 66  Resp:  [12] 12  BP: (125-135)/(60-63) 131/63  SpO2:  [94 %-98 %] 98 %  Body mass index is 34 53 kg/m²  Input and Output Summary (last 24 hours):   No intake or output data in the 24 hours ending 23 0923    Physical Exam:   Physical Exam  Constitutional:       General: He is not in acute distress  Appearance: He is obese   He is not ill-appearing  Cardiovascular:      Rate and Rhythm: Normal rate and regular rhythm  Pulses: Normal pulses  Heart sounds: Murmur heard  Pulmonary:      Effort: No respiratory distress  Breath sounds: Normal breath sounds  No wheezing or rales  Abdominal:      General: Bowel sounds are normal  There is no distension  Palpations: Abdomen is soft  Tenderness: There is no abdominal tenderness  Musculoskeletal:         General: No swelling or tenderness  Skin:     General: Skin is warm and dry  Findings: No erythema or rash  Neurological:      Mental Status: He is alert  Mental status is at baseline     Psychiatric:         Attention and Perception: Attention normal          Mood and Affect: Mood normal           Additional Data:     Labs:  Results from last 7 days   Lab Units 04/28/23  0522   WBC Thousand/uL 11 59*   HEMOGLOBIN g/dL 15 9   HEMATOCRIT % 46 2   PLATELETS Thousands/uL 294     Results from last 7 days   Lab Units 04/29/23  0510   SODIUM mmol/L 133*   POTASSIUM mmol/L 3 4*   CHLORIDE mmol/L 101   CO2 mmol/L 28   BUN mg/dL 29*   CREATININE mg/dL 1 10   ANION GAP mmol/L 4   CALCIUM mg/dL 9 5   GLUCOSE RANDOM mg/dL 106     Results from last 7 days   Lab Units 04/26/23  1540   INR  1 15     Results from last 7 days   Lab Units 04/30/23  0624 04/29/23  2106 04/29/23  1556 04/29/23  1046 04/29/23  0642 04/28/23  2153 04/28/23  2024 04/28/23  1620 04/28/23  1049 04/28/23  0615 04/27/23  2053 04/27/23  1642   POC GLUCOSE mg/dl 109 88 108 106 103 99 80 150* 108 101 91 107     Results from last 7 days   Lab Units 04/27/23  0517   HEMOGLOBIN A1C % 5 8*     Results from last 7 days   Lab Units 04/28/23  0522 04/27/23  0517   PROCALCITONIN ng/ml <0 05 <0 05       Lines/Drains:  Invasive Devices     Peripheral Intravenous Line  Duration           Peripheral IV 04/27/23 Right Antecubital 3 days                      Imaging: Reviewed radiology reports from this admission including: chest xray and MRI brain    Recent Cultures (last 7 days):         Last 24 Hours Medication List:   Current Facility-Administered Medications   Medication Dose Route Frequency Provider Last Rate   • acetaminophen  975 mg Oral Q6H PRN ERIC Og     • amLODIPine  10 mg Oral Daily Anson Veiira MD     • apixaban  5 mg Oral BID Anson Vieira MD     • atorvastatin  40 mg Oral Daily Anson Vieira MD     • calcium carbonate  1,000 mg Oral Daily PRN Anson Vieira MD     • citalopram  20 mg Oral Daily Anson Vieira MD     • docusate sodium  100 mg Oral BID Anson Vieira MD     • donepezil  5 mg Oral BID Anson Vieira MD     • hydrALAZINE  25 mg Oral TID Anson Vieira MD     • hydrochlorothiazide  25 mg Oral Daily ERIC Akbar     • insulin lispro  1-6 Units Subcutaneous TID AC Anson Vieira MD     • insulin lispro  1-6 Units Subcutaneous HS Anson Vieira MD     • lidocaine  1 patch Topical Daily ERIC Akbar     • losartan  50 mg Oral Daily Anson Vieira MD     • metoprolol succinate  100 mg Oral Daily Anson Vieira MD     • nicotine  14 mg Transdermal Daily ERIC Akbar     • ondansetron  4 mg Intravenous Q6H PRN Anson Vieira MD     • pantoprazole  20 mg Oral Early Morning Anson Vieira MD     • senna  2 tablet Oral Daily PRN Anson Vieira MD     • tamsulosin  0 4 mg Oral Daily With Bhumika Felix MD     • ticagrelor  90 mg Oral Q12H [address] [facility], DO          Today, Patient Was Seen By: ERIC Akbar    **Please Note: This note may have been constructed using a voice recognition system  **

## 2023-04-30 NOTE — PLAN OF CARE
Problem: PAIN - ADULT  Goal: Verbalizes/displays adequate comfort level or baseline comfort level  Description: Interventions:  - Encourage patient to monitor pain and request assistance  - Assess pain using appropriate pain scale  - Administer analgesics based on type and severity of pain and evaluate response  - Implement non-pharmacological measures as appropriate and evaluate response  - Consider cultural and social influences on pain and pain management  - Notify physician/advanced practitioner if interventions unsuccessful or patient reports new pain  Outcome: Progressing     Problem: INFECTION - ADULT  Goal: Absence or prevention of progression during hospitalization  Description: INTERVENTIONS:  - Assess and monitor for signs and symptoms of infection  - Monitor lab/diagnostic results  - Monitor all insertion sites, i e  indwelling lines, tubes, and drains  - Monitor endotracheal if appropriate and nasal secretions for changes in amount and color  - [place] appropriate cooling/warming therapies per order  - Administer medications as ordered  - Instruct and encourage patient and family to use good hand hygiene technique  - Identify and instruct in appropriate isolation precautions for identified infection/condition  Outcome: Progressing     Problem: SAFETY ADULT  Goal: Patient will remain free of falls  Description: INTERVENTIONS:  - Educate patient/family on patient safety including physical limitations  - Instruct patient to call for assistance with activity   - Consult OT/PT to assist with strengthening/mobility   - Keep Call bell within reach  - Keep bed low and locked with side rails adjusted as appropriate  - Keep care items and personal belongings within reach  - Initiate and maintain comfort rounds  - Make Fall Risk Sign visible to staff  - Offer Toileting every  Hours, in advance of need  - Initiate/Maintain alarm  - Obtain necessary fall risk management equipment:   - Apply yellow socks and bracelet for high fall risk patients  - Consider moving patient to room near nurses station  Outcome: Progressing     Problem: DISCHARGE PLANNING  Goal: Discharge to home or other facility with appropriate resources  Description: INTERVENTIONS:  - Identify barriers to discharge w/patient and caregiver  - Arrange for needed discharge resources and transportation as appropriate  - Identify discharge learning needs (meds, wound care, etc )  - Arrange for interpretive services to assist at discharge as needed  - Refer to Case Management Department for coordinating discharge planning if the patient needs post-hospital services based on physician/advanced practitioner order or complex needs related to functional status, cognitive ability, or social support system  Outcome: Progressing     Problem: Knowledge Deficit  Goal: Patient/family/caregiver demonstrates understanding of disease process, treatment plan, medications, and discharge instructions  Description: Complete learning assessment and assess knowledge base  Interventions:  - Provide teaching at level of understanding  - Provide teaching via preferred learning methods  Outcome: Progressing     Problem: NEUROSENSORY - ADULT  Goal: Achieves stable or improved neurological status  Description: INTERVENTIONS  - Monitor and report changes in neurological status  - Monitor vital signs such as temperature, blood pressure, glucose, and any other labs ordered   - Initiate measures to prevent increased intracranial pressure  - Monitor for seizure activity and implement precautions if appropriate      Outcome: Progressing  Goal: Achieves maximal functionality and self care  Description: INTERVENTIONS  - Monitor swallowing and airway patency with patient fatigue and changes in neurological status  - Encourage and assist patient to increase activity and self care     - Encourage visually impaired, hearing impaired and aphasic patients to use assistive/communication devices  Outcome: Progressing     Problem: CARDIOVASCULAR - ADULT  Goal: Maintains optimal cardiac output and hemodynamic stability  Description: INTERVENTIONS:  - Monitor I/O, vital signs and rhythm  - Monitor for S/S and trends of decreased cardiac output  - Administer and titrate ordered vasoactive medications to optimize hemodynamic stability  - Assess quality of pulses, skin color and temperature  - Assess for signs of decreased coronary artery perfusion  - Instruct patient to report change in severity of symptoms  Outcome: Progressing  Goal: Absence of cardiac dysrhythmias or at baseline rhythm  Description: INTERVENTIONS:  - Continuous cardiac monitoring, vital signs, obtain 12 lead EKG if ordered  - Administer antiarrhythmic and heart rate control medications as ordered  - Monitor electrolytes and administer replacement therapy as ordered  Outcome: Progressing     Problem: METABOLIC, FLUID AND ELECTROLYTES - ADULT  Goal: Electrolytes maintained within normal limits  Description: INTERVENTIONS:  - Monitor labs and assess patient for signs and symptoms of electrolyte imbalances  - Administer electrolyte replacement as ordered  - Monitor response to electrolyte replacements, including repeat lab results as appropriate  - Instruct patient on fluid and nutrition as appropriate  Outcome: Progressing  Goal: Glucose maintained within target range  Description: INTERVENTIONS:  - Monitor Blood Glucose as ordered  - Assess for signs and symptoms of hyperglycemia and hypoglycemia  - Administer ordered medications to maintain glucose within target range  - Assess nutritional intake and initiate nutrition service referral as needed  Outcome: Progressing     Problem: MOBILITY - ADULT  Goal: Maintain or return to baseline ADL function  Description: INTERVENTIONS:  -  Assess patient's ability to carry out ADLs; assess patient's baseline for ADL function and identify physical deficits which impact ability to perform ADLs (bathing, care of mouth/teeth, toileting, grooming, dressing, etc )  - Assess/evaluate cause of self-care deficits   - Assess range of motion  - Assess patient's mobility; develop plan if impaired  - Assess patient's need for assistive devices and provide as appropriate  - Encourage maximum independence but intervene and supervise when necessary  - Involve family in performance of ADLs  - Assess for home care needs following discharge   - Consider OT consult to assist with ADL evaluation and planning for discharge  - Provide patient education as appropriate  Outcome: Progressing  Goal: Maintains/Returns to pre admission functional level  Description: INTERVENTIONS:  - Perform BMAT or MOVE assessment daily    - Set and communicate daily mobility goal to care team and patient/family/caregiver  - Collaborate with rehabilitation services on mobility goals if consulted  - Perform Range of Motion  times a day  - Reposition patient every hours    - Dangle patient  times a day  - Stand patient  times a day  - Ambulate patient  times a day  - Out of bed to chair  times a day   - Out of bed for meals  times a day  - Out of bed for toileting  - Record patient progress and toleration of activity level   Outcome: Progressing

## 2023-04-30 NOTE — ASSESSMENT & PLAN NOTE
· Reports chronic lower back pain for 4 weeks which has not improved despite multiple trails of steroids, nsaids and muscle relaxants- pt was denying back pain for several days but now reporting that his low back is bother him today  · XR of lumbar spine ordered by PCP: No lumbar vertebral body fracture or subluxation  Multilevel degenerative changes     · PRN tylenol, add lidocaine patch   · Pt reports muscle relaxants haven't helped in the past

## 2023-04-30 NOTE — OCCUPATIONAL THERAPY NOTE
"  Occupational Therapy Treatment Note:      04/30/23 1500   OT Last Visit   OT Visit Date 04/30/23   Note Type   Note Type Treatment   Pain Assessment   Pain Assessment Tool 0-10   Pain Score No Pain   ADL   Where Assessed Standing at sink   Grooming Assistance 4  Minimal Assistance   Grooming Comments poor sequencing of task, cues to turn off water etc   UB Dressing Assistance 3  Moderate Assistance   LB Dressing Assistance 3  Moderate Assistance   Toileting Assistance  3  Moderate Assistance   Functional Standing Tolerance   Time poor + balance at times during chanllenging tasks in stance ie donning robe around back   Transfers   Sit to Stand 4  Minimal assistance   Stand to Sit 4  Minimal assistance   Sit pivot 3  Moderate assistance   Additional items   (mod asst during more complex tasks)   Functional Mobility   Functional Mobility 3  Moderate assistance  (pt requires min to mod asst  when pt becomes more confused with tasks he is noted to require mod asst   pt did not use rw this session  poor safety with same)   Cognition   Overall Cognitive Status Impaired   Arousal/Participation Alert; Cooperative   Attention Attends with cues to redirect   Memory Decreased recall of precautions;Decreased recall of recent events   Following Commands Follows one step commands inconsistently   Comments pt was unable to comprehend directions for scanning sheet \"[place]'s despite hand over hand direction  pt noted to [place] random numbers on paper  pt was able to verbalize 2/3 pictures on figure ground cards, verbally named 2 but was unable to name nor point at [place]" when offered pictures  pt demosntrates receptive and expressive deficits impeeding session  pt also had difficulty following verbal directions at times despite visual cues  pt left water running after washing face, washed face c glassess on then realized same   Vision   Vision Comments pt was noted to have difficulty placing a [place] around number with pen   " often missing number towards the bottom when drawing a [place]   Activity Tolerance   Activity Tolerance Patient tolerated treatment well   Assessment   Assessment pt participated in am ot session and was seen focusing on direction following,  ue coordination, visual scanning grooming and ub dressing tasks  pt demosntrates poor balance, nearly falls when attempting to tereza robe in stance  pt is limited by expressive deficits, some receptive difficulty with visual and verbal direction following, r ue coordination difficulty  pt would have left bathroom water running post face washing  he was noted to wash face while still wearing glassess and required up to heavy moderate asst to avoid falling / preventl significant lob  pt was able to bounce and catch ping pong balll on table top with moderate difficulty and droppage  continues with impaired coordination  pt unable to follow commands for opposition of fingers  pt noted to touch nose instead   Plan   Treatment Interventions ADL retraining;Functional transfer training; Endurance training;Patient/family training;Cognitive reorientation;UE strengthening/ROM; Visual perceptual retraining; Activityengagement   Goal Expiration Date 05/11/23   OT Treatment Day 1   OT Frequency 3-5x/wk   Recommendation   Recommendation Speech consult  (language/ cog)   OT Discharge Recommendation Post acute rehabilitation services   AM-PAC Daily Activity Inpatient   Lower Body Dressing 2   Bathing 2   Toileting 2   Upper Body Dressing 2   Grooming 3   Eating 3   Daily Activity Raw Score 14   Daily Activity Standardized Score (Calc for Raw Score >=11) 33 39   AM-PAC Applied Cognition Inpatient   Following a Speech/Presentation 2   Understanding Ordinary Conversation 3   Taking Medications 2   Remembering Where Things Are Placed or Put Away 2   Remembering List of 4-5 Errands 2   Taking Care of Complicated Tasks 2   Applied Cognition Raw Score 13   Applied Cognition Standardized Score 30 46 April A Storm

## 2023-04-30 NOTE — ASSESSMENT & PLAN NOTE
· Continue brilinta, Eliquis and statin  · PT/OT/ST- recommending acute rehab- pending auth   · Supportive care

## 2023-04-30 NOTE — PLAN OF CARE
Problem: PAIN - ADULT  Goal: Verbalizes/displays adequate comfort level or baseline comfort level  Description: Interventions:  - Encourage patient to monitor pain and request assistance  - Assess pain using appropriate pain scale  - Administer analgesics based on type and severity of pain and evaluate response  - Implement non-pharmacological measures as appropriate and evaluate response  - Consider cultural and social influences on pain and pain management  - Notify physician/advanced practitioner if interventions unsuccessful or patient reports new pain  Outcome: Progressing     Problem: INFECTION - ADULT  Goal: Absence or prevention of progression during hospitalization  Description: INTERVENTIONS:  - Assess and monitor for signs and symptoms of infection  - Monitor lab/diagnostic results  - Monitor all insertion sites, i e  indwelling lines, tubes, and drains  - Monitor endotracheal if appropriate and nasal secretions for changes in amount and color  - [place] appropriate cooling/warming therapies per order  - Administer medications as ordered  - Instruct and encourage patient and family to use good hand hygiene technique  - Identify and instruct in appropriate isolation precautions for identified infection/condition  Outcome: Progressing     Problem: SAFETY ADULT  Goal: Patient will remain free of falls  Description: INTERVENTIONS:  - Educate patient/family on patient safety including physical limitations  - Instruct patient to call for assistance with activity   - Consult OT/PT to assist with strengthening/mobility   - Keep Call bell within reach  - Keep bed low and locked with side rails adjusted as appropriate  - Keep care items and personal belongings within reach  - Initiate and maintain comfort rounds  - Make Fall Risk Sign visible to staff  - Apply yellow socks and bracelet for high fall risk patients  - Consider moving patient to room near nurses station  Outcome: Progressing     Problem: DISCHARGE PLANNING  Goal: Discharge to home or other facility with appropriate resources  Description: INTERVENTIONS:  - Identify barriers to discharge w/patient and caregiver  - Arrange for needed discharge resources and transportation as appropriate  - Identify discharge learning needs (meds, wound care, etc )  - Arrange for interpretive services to assist at discharge as needed  - Refer to Case Management Department for coordinating discharge planning if the patient needs post-hospital services based on physician/advanced practitioner order or complex needs related to functional status, cognitive ability, or social support system  Outcome: Progressing     Problem: Knowledge Deficit  Goal: Patient/family/caregiver demonstrates understanding of disease process, treatment plan, medications, and discharge instructions  Description: Complete learning assessment and assess knowledge base  Interventions:  - Provide teaching at level of understanding  - Provide teaching via preferred learning methods  Outcome: Progressing     Problem: NEUROSENSORY - ADULT  Goal: Achieves stable or improved neurological status  Description: INTERVENTIONS  - Monitor and report changes in neurological status  - Monitor vital signs such as temperature, blood pressure, glucose, and any other labs ordered   - Initiate measures to prevent increased intracranial pressure  - Monitor for seizure activity and implement precautions if appropriate      Outcome: Progressing  Goal: Achieves maximal functionality and self care  Description: INTERVENTIONS  - Monitor swallowing and airway patency with patient fatigue and changes in neurological status  - Encourage and assist patient to increase activity and self care     - Encourage visually impaired, hearing impaired and aphasic patients to use assistive/communication devices  Outcome: Progressing     Problem: CARDIOVASCULAR - ADULT  Goal: Maintains optimal cardiac output and hemodynamic stability  Description: INTERVENTIONS:  - Monitor I/O, vital signs and rhythm  - Monitor for S/S and trends of decreased cardiac output  - Administer and titrate ordered vasoactive medications to optimize hemodynamic stability  - Assess quality of pulses, skin color and temperature  - Assess for signs of decreased coronary artery perfusion  - Instruct patient to report change in severity of symptoms  Outcome: Progressing  Goal: Absence of cardiac dysrhythmias or at baseline rhythm  Description: INTERVENTIONS:  - Continuous cardiac monitoring, vital signs, obtain 12 lead EKG if ordered  - Administer antiarrhythmic and heart rate control medications as ordered  - Monitor electrolytes and administer replacement therapy as ordered  Outcome: Progressing     Problem: METABOLIC, FLUID AND ELECTROLYTES - ADULT  Goal: Electrolytes maintained within normal limits  Description: INTERVENTIONS:  - Monitor labs and assess patient for signs and symptoms of electrolyte imbalances  - Administer electrolyte replacement as ordered  - Monitor response to electrolyte replacements, including repeat lab results as appropriate  - Instruct patient on fluid and nutrition as appropriate  Outcome: Progressing  Goal: Glucose maintained within target range  Description: INTERVENTIONS:  - Monitor Blood Glucose as ordered  - Assess for signs and symptoms of hyperglycemia and hypoglycemia  - Administer ordered medications to maintain glucose within target range  - Assess nutritional intake and initiate nutrition service referral as needed  Outcome: Progressing     Problem: MOBILITY - ADULT  Goal: Maintain or return to baseline ADL function  Description: INTERVENTIONS:  -  Assess patient's ability to carry out ADLs; assess patient's baseline for ADL function and identify physical deficits which impact ability to perform ADLs (bathing, care of mouth/teeth, toileting, grooming, dressing, etc )  - Assess/evaluate cause of self-care deficits   - Assess range of motion  - Assess patient's mobility; develop plan if impaired  - Assess patient's need for assistive devices and provide as appropriate  - Encourage maximum independence but intervene and supervise when necessary  - Involve family in performance of ADLs  - Assess for home care needs following discharge   - Consider OT consult to assist with ADL evaluation and planning for discharge  - Provide patient education as appropriate  Outcome: Progressing  Goal: Maintains/Returns to pre admission functional level  Description: INTERVENTIONS:  - Perform BMAT or MOVE assessment daily    - Set and communicate daily mobility goal to care team and patient/family/caregiver     - Collaborate with rehabilitation services on mobility goals if consulted  - Out of bed for toileting  - Record patient progress and toleration of activity level   Outcome: Progressing

## 2023-05-01 ENCOUNTER — APPOINTMENT (INPATIENT)
Dept: RADIOLOGY | Facility: HOSPITAL | Age: 63
End: 2023-05-01

## 2023-05-01 ENCOUNTER — DOCUMENTATION (OUTPATIENT)
Dept: NEUROLOGY | Facility: CLINIC | Age: 63
End: 2023-05-01

## 2023-05-01 ENCOUNTER — APPOINTMENT (INPATIENT)
Dept: NON INVASIVE DIAGNOSTICS | Facility: HOSPITAL | Age: 63
End: 2023-05-01

## 2023-05-01 LAB
ANION GAP SERPL CALCULATED.3IONS-SCNC: 4 MMOL/L (ref 4–13)
BUN SERPL-MCNC: 36 MG/DL (ref 5–25)
CALCIUM SERPL-MCNC: 9.8 MG/DL (ref 8.3–10.1)
CHLORIDE SERPL-SCNC: 101 MMOL/L (ref 96–108)
CO2 SERPL-SCNC: 27 MMOL/L (ref 21–32)
CREAT SERPL-MCNC: 1.19 MG/DL (ref 0.6–1.3)
CRP SERPL QL: 7.9 MG/L
D DIMER PPP FEU-MCNC: 1.13 UG/ML FEU
ERYTHROCYTE [DISTWIDTH] IN BLOOD BY AUTOMATED COUNT: 13.1 % (ref 11.6–15.1)
ERYTHROCYTE [SEDIMENTATION RATE] IN BLOOD: 29 MM/HOUR (ref 0–19)
GFR SERPL CREATININE-BSD FRML MDRD: 64 ML/MIN/1.73SQ M
GLUCOSE SERPL-MCNC: 104 MG/DL (ref 65–140)
GLUCOSE SERPL-MCNC: 108 MG/DL (ref 65–140)
GLUCOSE SERPL-MCNC: 112 MG/DL (ref 65–140)
GLUCOSE SERPL-MCNC: 116 MG/DL (ref 65–140)
GLUCOSE SERPL-MCNC: 125 MG/DL (ref 65–140)
HCT VFR BLD AUTO: 46.1 % (ref 36.5–49.3)
HGB BLD-MCNC: 15.6 G/DL (ref 12–17)
MCH RBC QN AUTO: 32.6 PG (ref 26.8–34.3)
MCHC RBC AUTO-ENTMCNC: 33.8 G/DL (ref 31.4–37.4)
MCV RBC AUTO: 96 FL (ref 82–98)
PLATELET # BLD AUTO: 292 THOUSANDS/UL (ref 149–390)
PMV BLD AUTO: 10 FL (ref 8.9–12.7)
POTASSIUM SERPL-SCNC: 4.7 MMOL/L (ref 3.5–5.3)
RBC # BLD AUTO: 4.79 MILLION/UL (ref 3.88–5.62)
SODIUM SERPL-SCNC: 132 MMOL/L (ref 135–147)
WBC # BLD AUTO: 11.96 THOUSAND/UL (ref 4.31–10.16)

## 2023-05-01 PROCEDURE — B24BZZ4 ULTRASONOGRAPHY OF HEART WITH AORTA, TRANSESOPHAGEAL: ICD-10-PCS | Performed by: INTERNAL MEDICINE

## 2023-05-01 RX ORDER — OXYCODONE HYDROCHLORIDE 5 MG/1
5 TABLET ORAL ONCE AS NEEDED
Status: DISCONTINUED | OUTPATIENT
Start: 2023-05-01 | End: 2023-05-02

## 2023-05-01 RX ADMIN — TICAGRELOR 90 MG: 90 TABLET ORAL at 21:59

## 2023-05-01 RX ADMIN — APIXABAN 5 MG: 5 TABLET, FILM COATED ORAL at 09:00

## 2023-05-01 RX ADMIN — LOSARTAN POTASSIUM 50 MG: 50 TABLET, FILM COATED ORAL at 09:00

## 2023-05-01 RX ADMIN — CITALOPRAM HYDROBROMIDE 20 MG: 20 TABLET ORAL at 09:00

## 2023-05-01 RX ADMIN — ATORVASTATIN CALCIUM 40 MG: 40 TABLET, FILM COATED ORAL at 09:00

## 2023-05-01 RX ADMIN — LIDOCAINE 5% 1 PATCH: 700 PATCH TOPICAL at 08:59

## 2023-05-01 RX ADMIN — DONEPEZIL HYDROCHLORIDE 5 MG: 5 TABLET ORAL at 18:38

## 2023-05-01 RX ADMIN — HYDRALAZINE HYDROCHLORIDE 25 MG: 25 TABLET, FILM COATED ORAL at 09:00

## 2023-05-01 RX ADMIN — AMLODIPINE BESYLATE 10 MG: 10 TABLET ORAL at 08:59

## 2023-05-01 RX ADMIN — HYDRALAZINE HYDROCHLORIDE 25 MG: 25 TABLET, FILM COATED ORAL at 16:18

## 2023-05-01 RX ADMIN — TAMSULOSIN HYDROCHLORIDE 0.4 MG: 0.4 CAPSULE ORAL at 16:18

## 2023-05-01 RX ADMIN — ACETAMINOPHEN 975 MG: 325 TABLET ORAL at 05:19

## 2023-05-01 RX ADMIN — HYDRALAZINE HYDROCHLORIDE 25 MG: 25 TABLET, FILM COATED ORAL at 21:59

## 2023-05-01 RX ADMIN — DONEPEZIL HYDROCHLORIDE 5 MG: 5 TABLET ORAL at 09:00

## 2023-05-01 RX ADMIN — PANTOPRAZOLE SODIUM 20 MG: 20 TABLET, DELAYED RELEASE ORAL at 05:20

## 2023-05-01 RX ADMIN — APIXABAN 5 MG: 5 TABLET, FILM COATED ORAL at 18:38

## 2023-05-01 RX ADMIN — TICAGRELOR 90 MG: 90 TABLET ORAL at 09:13

## 2023-05-01 RX ADMIN — HYDROCHLOROTHIAZIDE 25 MG: 25 TABLET ORAL at 09:00

## 2023-05-01 RX ADMIN — METOPROLOL SUCCINATE 100 MG: 100 TABLET, EXTENDED RELEASE ORAL at 09:00

## 2023-05-01 NOTE — PHYSICAL THERAPY NOTE
PHYSICAL THERAPY TREATMENT  NAME:  Dayton Juares: 05/01/23    AGE:   58 y o  Mrn:   [id number]  ADMIT DX:  Confusion [R41 0]  Stroke [facility]) [I63 9]  [place] (NIH) Stroke Scale level of consciousness score 0, alert; keenly responsive [Z78 9]    Past Medical History:   Diagnosis Date    Depression     Diabetes mellitus ([address] )     Dyslipidemia 03/26/2019    Hyperlipidemia     Hypertension     Stroke [facility])      Past Surgical History:   Procedure Laterality Date    BACK SURGERY      IR STROKE ALERT  3/19/2019    SHOULDER SURGERY         Length Of Stay: 5     05/01/23 1158   PT Last Visit   PT Visit Date 05/01/23   Note Type   Note Type Treatment   Pain Assessment   Pain Assessment Tool 0-10   Pain Score 3   Pain Location/Orientation Orientation: Mid;Orientation: Lower; Location: Back   Pain Radiating Towards n/a   Pain Onset/Description Onset: Ongoing   Patient's Stated Pain Goal No pain   Restrictions/Precautions   Weight Bearing Precautions Per Order No   Braces or Orthoses   (none)   Other Precautions Cognitive; Chair Alarm; Bed Alarm; Fall Risk;Pain  (expressive aphasia)   Cognition   Overall Cognitive Status Impaired   Arousal/Participation Alert; Cooperative   Attention Attends with cues to redirect   Orientation Level Oriented to person;Oriented to place;Oriented to situation   Memory Decreased recall of precautions   Following Commands Follows one step commands with increased time or repetition   Subjective   Subjective pt agreeable to mobilize w/ PT   Bed Mobility   Additional Comments pt presents OOB in chair -   Transfers   Sit to Stand 4  Minimal assistance  (per formed multiple time w/ then w/o AD- R LOB on initial stand was able to correct w/ Jacky + TC)   Stand to Sit 4  Minimal assistance   Ambulation/Elevation   Gait pattern R Foot drag  (noted decreased R foot clearance w/ fatigue)   Gait Assistance 4  Minimal assist   Additional items Assist x 1   Assistive Device   (HHA then w/ RW)   Distance 39' w/ RW then 39' w/o AD- pt requireing Jacky + cues for attention ot R side- when using RW pt noted to have R  slip off RW a few time requiring cues for correction; w/ HHA pt requiring cues for R foot clearance and Jacky for balance required  Balance   Static Sitting Fair +   Dynamic Sitting Fair   Static Standing Fair -   Dynamic Standing Poor   Ambulatory Poor   Endurance Deficit   Endurance Deficit Yes   Endurance Deficit Description TURNER- requiring cues for taking appropiate rest breaks  noted decreased R foot clearacne w/ increased fatigue   Activity Tolerance   Activity Tolerance Patient limited by fatigue   Medical Staff Made Aware care coordination w/ RN and CM for d/c planning   Exercises   Hip Flexion Sitting;Standing;10 reps;Right;Left   Hip Abduction Sitting;10 reps;Right;Left   Hip Adduction Sitting;10 reps;Right;Left   Neuro re-ed standing balcne/ righting reactions   Balance training  sitting standing   Assessment   Prognosis Good   Problem List Decreased range of motion;Decreased endurance; Impaired balance;Decreased mobility; Decreased strength;Decreased coordination;Decreased cognition; Impaired judgement;Decreased safety awareness; Obesity;Pain   Assessment pt seen for PT tx session for progression of transfer training; gait trianing; balance trianing; TE/ TA to achieve goals and manimize functional potential  Pt continues t require min A especially when fatigued  Pt w/ mild LOB to R x2 during stnading and gait trainin w/ RW w/ Jacky for correction  Cues required for clearacne of R LE especially during turn ing and obstacle negotiation w/ fatigue  PT continues to recommend acute rehab on d/c to maximize functional potential and recovery post stroke  Pt seated in chair w/ all needs in reach + chair alarm post session  Goals   Patient Goals to get better     STG Expiration Date 05/11/23   PT Treatment Day 2   Plan   Treatment/Interventions Functional transfer training;LE strengthening/ROM; Elevations; Therapeutic exercise;Cognitive reorientation; Endurance training;Patient/family training;Equipment eval/education; Bed mobility;Gait training;Spoke to nursing;Spoke to case management;OT;Spoke to advanced practitioner;ADL retraining   Progress Progressing toward goals   PT Frequency 3-5x/wk   Recommendation   PT Discharge Recommendation Post acute rehabilitation services   Equipment Recommended   (TBD)   AM-PAC Basic Mobility Inpatient   Turning in Flat Bed Without Bedrails 3   Lying on Back to Sitting on Edge of Flat Bed Without Bedrails 3   Moving Bed to Chair 3   Standing Up From Chair Using Arms 3   Walk in Room 2   Climb 3-5 Stairs With Railing 2   Basic Mobility Inpatient Raw Score 16   Basic Mobility Standardized Score 38 32   Highest Level Of Mobility   JH-HLM Goal 5: Stand one or more mins   JH-HLM Achieved 7: Walk 25 feet or more   Education   Education Provided Mobility training   Patient Demonstrates verbal understanding;Reinforcement needed   End of Consult   Patient Position at End of Consult Bedside chair;Bed/Chair alarm activated; All needs within reach            Wei De Los Santos, PT

## 2023-05-01 NOTE — ASSESSMENT & PLAN NOTE
Presented to the ER as a stroke alert with history of known severe R M1 stenosis, prior tobacco use, recent L MCA stroke (residual aphasia) 04/16/2023 came in as a Stroke alert on 4/26/2023  NIHSS of 2  Initial presenting deficits were L facial droop, R sided weakness, and AMS   As a result of past stroke within the past month and on eliquis pt was determined to not be a candidate for thrombolysis   · 4/26/2023 CTA of the head and neck-Stable moderate (60 to 65%) stenosis in the bilateral internal carotid arteries  Stable severe stenosis distal right M1 segment  Stable severe stenosis proximal left M2  No new intracranial stenosis or large vessel occlusion  · MRI brain 4/27- Compared to the prior recent MRI examination dated 4/17/2023, interval increase in size of diffusion abnormality in the left parietal subcortical and periventricular white matter consistent with acute to subacute ischemia  New small focus of gyral diffusion abnormality in the right frontal parietal region  Persistent additional small foci of diffusion abnormality in the left periatrial and parieto-occipital region  No acute hemorrhage or midline shift  Stable moderate chronic microangiopathic changes within the brain  · 4/18/2023 Echo: LVEF 55%  Systolic function is normal  Wall motion is normal   Grade 1 diastolic dysfunction left and right atrium mildly dilated  Mild aortic valve stenosis    Mild mitral and tricuspid valve regurgitation  · No arrythmias on telemetry monitoring, now discontinued  · Neurology following, appreciate recommendations   · Started on Brilinta 4/27  · Continue with Eliquis 5 mg twice daily and statin   · 5/1 - PT and RN noted with changes in gait with favoring/leaning to right side, additional workup ordered and pending: MRI brain, IVY, carotid US, cytometry, D-dimer, ESR/CRP  · PT/OT recommending acute rehab at discharge, approved for [facility] pending insurance auth

## 2023-05-01 NOTE — PROGRESS NOTES
[facility]  Progress Note  Name: Phuong Caballero  MRN: [id number]  Unit/Bed#: [facility] 703-01 I Date of Admission: 4/26/2023   Date of Service: 5/1/2023 I Hospital Day: 5    Assessment/Plan   * Stroke [facility])  Assessment & Plan  Presented to the ER as a stroke alert with history of known severe R M1 stenosis, prior tobacco use, recent L MCA stroke (residual aphasia) 04/16/2023 came in as a Stroke alert on 4/26/2023  NIHSS of 2  Initial presenting deficits were L facial droop, R sided weakness, and AMS   As a result of past stroke within the past month and on eliquis pt was determined to not be a candidate for thrombolysis   · 4/26/2023 CTA of the head and neck-Stable moderate (60 to 65%) stenosis in the bilateral internal carotid arteries  Stable severe stenosis distal right M1 segment  Stable severe stenosis proximal left M2  No new intracranial stenosis or large vessel occlusion  · MRI brain 4/27- Compared to the prior recent MRI examination dated 4/17/2023, interval increase in size of diffusion abnormality in the left parietal subcortical and periventricular white matter consistent with acute to subacute ischemia  New small focus of gyral diffusion abnormality in the right frontal parietal region  Persistent additional small foci of diffusion abnormality in the left periatrial and parieto-occipital region  No acute hemorrhage or midline shift  Stable moderate chronic microangiopathic changes within the brain  · 4/18/2023 Echo: LVEF 55%  Systolic function is normal  Wall motion is normal   Grade 1 diastolic dysfunction left and right atrium mildly dilated  Mild aortic valve stenosis    Mild mitral and tricuspid valve regurgitation  · No arrythmias on telemetry monitoring, now discontinued  · Neurology following, appreciate recommendations   · Started on Brilinta 4/27  · Continue with Eliquis 5 mg twice daily and statin   · 5/1 - PT and RN noted with changes in gait with favoring/leaning to right side, additional workup ordered and pending: MRI brain, IVY, carotid US, cytometry, D-dimer, ESR/CRP  · PT/OT recommending acute rehab at discharge, approved for [facility] pending insurance auth     Chronic ischemic right MCA stroke  Assessment & Plan  · See plan above     Leukocytosis  Assessment & Plan  · Noted on admission with WBC 12K  · Infectious workup unremarkable   · Procal levels negative   · Remains stable off of antibiotic   · Trend WBC    Hyponatremia  Assessment & Plan  · Mild, likely due to poor oral intake and HCTZ use  · HCTZ now resumed  · Monitor     Primary hypertension  Assessment & Plan  · Per wife BP has been uncontrolled outpatient, compliance has not been ideal until she started managing his medications  · Continue with metoprolol 100mg daily, losartan 50mg daily, hydralazine 25mg TID and amlodipine 10mg daily, HCTZ 25mg   · BP stable, monitor routinely     Prediabetes  Assessment & Plan  · A1c 5 8  · Currently holding metformin while hospitalized, resume on discharge   · Carb controlled diet     Chronic low back pain  Assessment & Plan  Reports chronic lower back pain x 4 weeks which has not improved despite multiple trails of steroids, nsaids and muscle relaxants  · XR of lumbar spine ordered by PCP: No lumbar vertebral body fracture or subluxation  Multilevel degenerative changes  · PRN tylenol, lidocaine patch   · PT/OT    Adjustment reaction with anxiety  Assessment & Plan  · Continue celexa   · Supportive cares    Nicotine dependence  Assessment & Plan  · Encourage cessation   · Added nicoderm patch          VTE Pharmacologic Prophylaxis: VTE Score: 3 Moderate Risk (Score 3-4) - Pharmacological DVT Prophylaxis Ordered: apixaban (Eliquis)  Patient Centered Rounds: I performed bedside rounds with nursing staff today    Discussions with Specialists or Other Care Team Provider: primary RN, neurology, case management     Education and Discussions with Family / Patient: Attempted to update  (wife) via phone  Left voicemail  Total Time Spent on Date of Encounter in care of patient: 45 minutes This time was spent on one or more of the following: performing physical exam; counseling and coordination of care; obtaining or reviewing history; documenting in the medical record; reviewing/ordering tests, medications or procedures; communicating with other healthcare professionals and discussing with patient's family/caregivers  Current Length of Stay: 5 day(s)  Current Patient Status: Inpatient   Certification Statement: The patient will continue to require additional inpatient hospital stay due to additional workup per neurology  Discharge Plan: Anticipate discharge in 24-48 hrs to rehab facility  Code Status: Level 1 - Full Code    Subjective:   Patient offers no new complaints today, declines worsening weakness  Discussed neurology plan for additional workup given changes in his gait  Objective:     Vitals:   Temp (24hrs), Av 2 °F (36 8 °C), Min:98 2 °F (36 8 °C), Max:98 2 °F (36 8 °C)    Temp:  [98 2 °F (36 8 °C)] 98 2 °F (36 8 °C)  HR:  [64-91] 91  Resp:  [15-16] 16  BP: (110-137)/(60-89) 137/71  SpO2:  [93 %-96 %] 94 %  Body mass index is 34 53 kg/m²  Input and Output Summary (last 24 hours):   No intake or output data in the 24 hours ending 23 1530    Physical Exam:   Physical Exam  Vitals reviewed  Constitutional:       General: He is not in acute distress  Cardiovascular:      Rate and Rhythm: Normal rate and regular rhythm  Heart sounds: No murmur heard  Pulmonary:      Effort: Pulmonary effort is normal  No respiratory distress  Breath sounds: No wheezing, rhonchi or rales  Neurological:      General: No focal deficit present  Mental Status: He is alert and oriented to person, place, and time  Cranial Nerves: No dysarthria  Motor: No weakness            Additional Data:     Labs:  Results from last 7 days   Lab Units 05/01/23  0617   WBC Thousand/uL 11 96*   HEMOGLOBIN g/dL 15 6   HEMATOCRIT % 46 1   PLATELETS Thousands/uL 292     Results from last 7 days   Lab Units 05/01/23  0617   SODIUM mmol/L 132*   POTASSIUM mmol/L 4 7   CHLORIDE mmol/L 101   CO2 mmol/L 27   BUN mg/dL 36*   CREATININE mg/dL 1 19   ANION GAP mmol/L 4   CALCIUM mg/dL 9 8   GLUCOSE RANDOM mg/dL 112     Results from last 7 days   Lab Units 04/26/23  1540   INR  1 15     Results from last 7 days   Lab Units 05/01/23  1029 05/01/23  0602 04/30/23  2102 04/30/23  1624 04/30/23  1055 04/30/23  0624 04/29/23  2106 04/29/23  1556 04/29/23  1046 04/29/23  0642 04/28/23  2153 04/28/23  2024   POC GLUCOSE mg/dl 116 108 128 130 107 109 88 108 106 103 99 80     Results from last 7 days   Lab Units 04/27/23  0517   HEMOGLOBIN A1C % 5 8*     Results from last 7 days   Lab Units 04/28/23  0522 04/27/23  0517   PROCALCITONIN ng/ml <0 05 <0 05       Lines/Drains:  Invasive Devices     Peripheral Intravenous Line  Duration           Peripheral IV 04/27/23 Right Antecubital 4 days                      Imaging: No pertinent imaging reviewed      Recent Cultures (last 7 days):         Last 24 Hours Medication List:   Current Facility-Administered Medications   Medication Dose Route Frequency Provider Last Rate   • acetaminophen  975 mg Oral Q6H PRN ERIC Og     • amLODIPine  10 mg Oral Daily Angel Julian MD     • apixaban  5 mg Oral BID Angel Julian MD     • atorvastatin  40 mg Oral Daily Angel Julian MD     • calcium carbonate  1,000 mg Oral Daily PRN Angel Julian MD     • citalopram  20 mg Oral Daily Angel Julian MD     • docusate sodium  100 mg Oral BID PRN ERIC Velasco     • donepezil  5 mg Oral BID Angel Julian MD     • hydrALAZINE  25 mg Oral TID Angel Julian MD     • hydrochlorothiazide  25 mg Oral Daily ERIC Og     • insulin lispro  1-6 Units Subcutaneous TID AC Angel Julian MD     • insulin lispro  1-6 Units Subcutaneous HS John Ramirez MD     • lidocaine  1 patch Topical Daily ERIC Og     • losartan  50 mg Oral Daily John Ramirez MD     • metoprolol succinate  100 mg Oral Daily John Ramirez MD     • nicotine  14 mg Transdermal Daily ERIC Giles     • ondansetron  4 mg Intravenous Q6H PRN John Ramirez MD     • pantoprazole  20 mg Oral Early Morning John Ramirez MD     • senna  2 tablet Oral Daily PRN John Ramirez MD     • tamsulosin  0 4 mg Oral Daily With Manasa De La Torre MD     • ticagrelor  90 mg Oral Q12H [address] [facility],           Today, Patient Was Seen By: Lex Kamara PA-C    **Please Note: This note may have been constructed using a voice recognition system  **

## 2023-05-01 NOTE — CASE MANAGEMENT
Nancy Medina 49 has received approved authorization from insurance: [employer] Puente in by [employer]# [phone] [id number]  Authorization received for: Acute Rehab  Facility: [facility] [address] #: [id number]  Start of Care: 5/1  Next Review Date: 5/8  Submit next review to: fax# [phone]   Care Manager notified: Suellen Caldwell

## 2023-05-01 NOTE — PLAN OF CARE
Problem: PHYSICAL THERAPY ADULT  Goal: Performs mobility at highest level of function for planned discharge setting  See evaluation for individualized goals  Description:    Equipment Recommended:  (TBD)       See flowsheet documentation for full assessment, interventions and recommendations  Outcome: Progressing  Note: Prognosis: Good  Problem List: Decreased range of motion, Decreased endurance, Impaired balance, Decreased mobility, Decreased strength, Decreased coordination, Decreased cognition, Impaired judgement, Decreased safety awareness, Obesity, Pain  Assessment: pt seen for PT tx session for progression of transfer training; gait trianing; balance trianing; TE/ TA to achieve goals and manimize functional potential  Pt continues t require min A especially when fatigued  Pt w/ mild LOB to R x2 during stnading and gait trainin w/ RW w/ Jacky for correction  Cues required for clearacne of R LE especially during turn ing and obstacle negotiation w/ fatigue  PT continues to recommend acute rehab on d/c to maximize functional potential and recovery post stroke  Pt seated in chair w/ all needs in reach + chair alarm post session  PT Discharge Recommendation: Post acute rehabilitation services    See flowsheet documentation for full assessment

## 2023-05-01 NOTE — PROGRESS NOTES
Pastoral Care Progress Note    2023  Patient: Luis Daniel Valiente : 1960  Admission Date & Time: 2023 1530  MRN: [id number] [facility]: [id number]         23 0900   Clinical Encounter Type   Visited With Patient   Buddhist Encounters   Buddhist Needs Prayer   Sacramental Encounters   Sacrament of Sick-Anointing Patient declined anointing     Yamel Bazzi met with the pt and provided prayers and blessings  The pt declined Fr's offer for anointing  No further needs were expressed at this time  Chaplains still remain available

## 2023-05-01 NOTE — PROGRESS NOTES
Met with patient at bedside in room 703  No visitors were present  Introduced my role  Provided stroke education including the stroke education booklet  Patient is agreeable to outreach phone calls after discharge  Assisted patient to the bathroom and called for assistance from Nay Spencer, [facility]  Patient was made aware to call for assistance  Nurse notified

## 2023-05-01 NOTE — ASSESSMENT & PLAN NOTE
· Noted on admission with WBC 12K  · Infectious workup unremarkable   · Procal levels negative   · Remains stable off of antibiotic   · Trend WBC

## 2023-05-01 NOTE — PROGRESS NOTES
NEUROLOGY RESIDENCY PROGRESS NOTE     Name: Carrie Liu   Age & Sex: 58 y o  male   MRN: [id number]  [facility]   Encounter: [id number]    Carrie Liu will need follow up in in 6 weeks with neurovascular attending  He will not require outpatient neurological testing, at this time  ASSESSMENT & PLAN     * Stroke [facility])  Assessment & Plan  58year old male with HTN, HLD, DM2, prior R MCA CVA s/p thrombectomy in March 2019 w/ no etiology found despite years of loop recorder (and was maintained on Plavix 75mg after that), known severe R M1 stenosis, prior tobacco use, recent L MCA stroke (had aphasia and was empirically AC eliquis 5mg BID w/ ASA 81mg at that time due to ESUSx2) 04/16/2023 (was on came in as a Stroke alert on 4/26/2023  3:30 PM with initial NIHSS of 2 (for LOC questions) and LKW 2:30pm, initial Blood Pressure: 154/71  Initial presenting deficits were L facial droop, R sided weakness, and AMS but on actual exam confused/altered unable to give correct month and correct age    As a result of past stroke within the past month and on eliquis pt was determined to not be a candidate for thrombolysis (TNK)  Has had previous Loop recorder that was negative for any arrhythmias  While here patient had noted new multifocal strokes  Switched from ASA 81mg to Brilinta 90mg BID  prior work-up including IVY (no PFO) and loop recorder without evidence of Afib  CT of chest abdomen and pelvis with no evidence of malignancy 04/17/2023  Thrombosis panel done in 04/18/2023 that was only abnl for AT3 that was low in the acute setting  Previous TTE 04/18/23 showed ED of 55% and nl wall motion and mildly dilated L atrium       • Exam on 05/01/23: appears aphasic unable to give correct month and correct age and expresses frustration, more frontal aphasia, paraphasic errors, R homonymous hemianopia, R leaning gait and small steppage   • Current Blood Pressure: 137/71, BP over 24 hours: BP Min: 110/89  Max: 137/71   • Vascular risk factors: past L MCA and R MCA stroke, DM, HTN, HLD  • Home meds: eliquis 5mg BID, ASA 81mg     Workup:  Lab Results   Component Value Date    HGBA1C 5 8 (H) 04/27/2023    CHOLESTEROL 109 04/27/2023    LDLCALC 40 04/27/2023    TRIG 166 (H) 04/27/2023    INR 1 15 04/26/2023      • CTH: No acute intracranial abnormality  Chronic microangiopathy  Small evolving subacute infarct in the left parietal periventricular white matter  Other small recent infarcts better visualized on previous MRI  • CTA: Stable moderate (60 to 65%) stenosis in the bilateral internal carotid arteries  Stable severe stenosis distal right M1 segment  Stable severe stenosis proximal left M2    • MRI: Compared to the prior recent MRI examination dated 4/17/2023, interval increase in size of diffusion abnormality in the left parietal subcortical and periventricular white matter consistent with acute to subacute ischemia  New small focus of gyral diffusion abnormality in the right frontal parietal region (4:20)  Persistent additional small foci of diffusion abnormality in the left periatrial and parieto-occipital region  No acute hemorrhage or midline shift  Stable moderate chronic microangiopathic changes within the brain  • Echocardiogram: EF of 55% and nl wall motion and mildly dilated L atrium  • IVY in 2019 nl and no soruce of cardiac embolism; LEFT ATRIUM: Size was normal  No thrombus was identified  APPENDAGE: The appendage was small  No thrombus was identified  DOPPLER: The function was mildly reduced (mildly reduced emptying velocity)     • Telemetry: negative    Pertinent scores:  - NIHSS: 2  Stroke Modified Moniteau Score: 0 (No baseline symptoms/disability)    Impression: New multiple strokes difficult to state whether cardioembolic give dilated L atrium as well as noted  Severe atherosclerosis in L and R MCA so cardioembolic vs atherembolic vs mixed picture vs central cause, further work-up needs to be don at this time    Plan:  - Discussed plan with neurology attending, Dr Reginald Santiago  - Given noted acute change in patient's gait on the right side that is noticeable by multiple staff that have seen the patient over the past few days, we will get a repeat MRI brain with and without contrast  - will order D-dimer, serum flow cytometry, carotid duplex ultrasound  - will order IVY (expected tomorrow, will place n p o  at midnight) to further further evaluate patient's heart given last TTE was in 2019  - Antiplatelet agents: switched BRILINTA 90mg BID   - given that patient was on plavix when he had his stroke on 04/16/2023  - Continue home AC eliquis 5mg BID  - Given noted past history of prior cognitive impairment and previous MOCA of 18/30 in past neurology outpatient visits, would recommend outpatient neurocognitive evaluation  - BP: allow for normotension  - atorvastatin 40mg qhs  - Maintain glucose <180, SSI for coverage if indicated  - Medical management as per primary team appreciated  - continue w/ correction of any toxic/metabolic abnormalities  - DVT ppx and SCDs  - Monitor on telemetry  - PT/OT/Speech/PM&R input appreciated   - patient would likely benefit from speech therapy outpatient  - Stroke education  - rest of care as per primary team                SUBJECTIVE     Patient was seen and examined  This a m , restorative physician noted that patient was leaning more to the right and was having in the more abnormal gait  This is different than his gait over the past several days where he was not leaning as much as his right  Nursing staff did note that her assessment in terms of strength was at patient's baseline over the past few days  Physical therapy also evaluated patient and did not notice that he had a new change with right-sided leaning and wide-based gait particularly with smaller steps on the right      I was able to appreciate this also during my exam when seen patient walking down the halls with physical therapy  Patient denies any headaches and understands that he is in the hospital for strokes  He still is aphasic and is unable to give the best answers to questions but is able to follow some commands  He denies any headaches, syncope, paresthesias, diplopia, visual changes, tinnitus, sudden onset weakness, garbled speech, dysarthria/slurring of words,  Loss of bowel or bladder      Review of Systems   Unable to perform ROS: Acuity of condition       OBJECTIVE     Patient ID: Tim Crowder is a 58 y o  male  Vitals:    23 0859 23 0906 23 1052 23 1129   BP: 119/85 119/85 136/65 137/71   Pulse:  84 84 91   Resp:       Temp:       TempSrc:       SpO2:  93% 95% 94%   Weight:          Temperature:   Temp (24hrs), Av 2 °F (36 8 °C), Min:98 2 °F (36 8 °C), Max:98 2 °F (36 8 °C)    Temperature: 98 2 °F (36 8 °C)      Physical Exam     Neurologic Exam     Gait, Coordination, and Reflexes     Gait  Gait: wide-based         Physical Exam  Vitals and nursing note reviewed  Constitutional:       General: He is not in acute distress  Appearance: He is well-developed  HENT:      Head: Normocephalic and atraumatic  Eyes:      Extraocular Movements: EOM normal       Conjunctiva/sclera: Conjunctivae normal       Pupils: Pupils are equal, round, and reactive to light  Cardiovascular:      Rate and Rhythm: Normal rate  Pulmonary:      Effort: Pulmonary effort is normal    Abdominal:      Palpations: Abdomen is soft  Tenderness: There is no abdominal tenderness  Musculoskeletal:         General: No swelling  Cervical back: Neck supple  Skin:     General: Skin is warm and dry  Capillary Refill: Capillary refill takes less than 2 seconds  Neurological:      Mental Status: He is alert and oriented to person, place, and time        Motor: Motor strength is normal       Coordination: Finger-Nose-Finger Test and Heel to [place] chilo Test normal       Deep Tendon Reflexes:      Reflex Scores:       Tricep reflexes are 2+ on the right side and 2+ on the left side  Bicep reflexes are 2+ on the right side and 2+ on the left side  Brachioradialis reflexes are 2+ on the right side and 2+ on the left side  Patellar reflexes are 2+ on the right side and 2+ on the left side  Achilles reflexes are 2+ on the right side and 2+ on the left side  Psychiatric:         Mood and Affect: Mood normal          Speech: Speech normal          Neurologic Exam      Mental Status   Oriented to person, place,    Disoriented to time and season  Speech: speech is normal   Knowledge: good  Able to name object  Able to repeat  Some paraphasic errors    Was able to name clock but got time incorrect   Had difficulty w/ 2-step commands   Could do simple math (2+2) but had trouble naming how many quarters in $1 75  No apraxia appreciated  Patient was able to repeat without any issues    Cranial Nerves      CN II    noted right homonymous hemianopia that seems to be consistent with patient's known stroke     CN III, IV, VI   Pupils are equal, round, and reactive to light  Extraocular motions are normal       CN V   Facial sensation intact       CN VII   Facial expression full, symmetric       CN VIII   CN VIII normal       CN IX, X   CN IX normal    CN X normal       CN XI   CN XI normal       CN XII   CN XII normal       Motor Exam   Overall muscle tone: normal     Strength   Strength 5/5 throughout     Noted R arm orbiting      Sensory Exam   Light touch normal    Pinprick normal       Gait, Coordination, and Reflexes      Coordination   Finger to nose coordination: Patient was able to do finger-nose but had very to much difficulty following the problems particular with his left side  Heel to shin coordination: normal     Reflexes   Right brachioradialis: 2+  Left brachioradialis: 2+  Right biceps: 2+  Left biceps: 2+  Right triceps: 2+  Left triceps: 2+  Right patellar: 2+  Left patellar: 2+  Right achilles: 2+  Left achilles: 2+  Right plantar: normal  Left plantar: normal  Right ankle clonus: absent  Left ankle clonus: absent     On gait exam noted may be can noted wide-based gait patient was leaning on the right and is taking smaller steps with his right leg when using the walker      LABORATORY DATA     Labs: I have personally reviewed pertinent reports  and I have personally reviewed pertinent films in PACS  Results from last 7 days   Lab Units 05/01/23  0617 04/28/23  0522 04/27/23  0517   WBC Thousand/uL 11 96* 11 59* 11 01*   HEMOGLOBIN g/dL 15 6 15 9 15 7   HEMATOCRIT % 46 1 46 2 45 3   PLATELETS Thousands/uL 292 294 308      Results from last 7 days   Lab Units 05/01/23  0617 04/29/23  0510 04/28/23  1112   SODIUM mmol/L 132* 133* 133*   POTASSIUM mmol/L 4 7 3 4* 3 9   CHLORIDE mmol/L 101 101 101   CO2 mmol/L 27 28 28   BUN mg/dL 36* 29* 24   CREATININE mg/dL 1 19 1 10 1 08   CALCIUM mg/dL 9 8 9 5 9 7     Results from last 7 days   Lab Units 04/27/23  0517 04/26/23  1540   MAGNESIUM mg/dL 2 4 2 4     Results from last 7 days   Lab Units 04/27/23  0517 04/26/23  1540   PHOSPHORUS mg/dL 3 6 3 5      Results from last 7 days   Lab Units 04/26/23  1540   INR  1 15   PTT seconds 34               IMAGING & DIAGNOSTIC TESTING     Radiology Results: I have personally reviewed pertinent reports  and I have personally reviewed pertinent films in PACS    XR chest pa & lateral   Final Result by Garcia Bass MD (04/28 1019)      No acute cardiopulmonary disease  Workstation performed: [id number]         MRI brain wo contrast   Final Result by Cielo Walters MD (04/27 7589)      Compared to the prior recent MRI examination dated 4/17/2023, interval increase in size of diffusion abnormality in the left parietal subcortical and periventricular white matter consistent with acute to subacute ischemia         New small focus of gyral diffusion abnormality in the right frontal parietal region (4:20)  Persistent additional small foci of diffusion abnormality in the left periatrial and parieto-occipital region  No acute hemorrhage or midline shift  Stable moderate chronic microangiopathic changes within the brain  Study was marked in [facility] for immediate notification  Workstation performed: [id number]         CTA stroke alert (head/neck)   Final Result by SUSAN Burgos MD (04/26 1611)   Stable moderate (60 to 65%) stenosis in the bilateral internal carotid arteries  Stable severe stenosis distal right M1 segment  Stable severe stenosis proximal left M2  No new intracranial stenosis or large vessel occlusion  Findings were directly discussed with Anna Malik at 3:42 p m  Workstation performed: [id number]         CT stroke alert brain   Final Result by SUSAN Burgos MD (04/26 3176)      No acute intracranial abnormality  Chronic microangiopathy  Small evolving subacute infarct in the left parietal periventricular white matter  Other small recent infarcts better visualized on previous MRI  Findings were directly discussed with Anna Malik at 3:42 p m  Workstation performed: [id number]         VAS carotid complete study    (Results Pending)   MRI inpatient order    (Results Pending)       Other Diagnostic Testing: I have personally reviewed pertinent reports     and I have personally reviewed pertinent films in PACS    ACTIVE MEDICATIONS     Current Facility-Administered Medications   Medication Dose Route Frequency   • acetaminophen (TYLENOL) tablet 975 mg  975 mg Oral Q6H PRN   • amLODIPine (NORVASC) tablet 10 mg  10 mg Oral Daily   • apixaban (ELIQUIS) tablet 5 mg  5 mg Oral BID   • atorvastatin (LIPITOR) tablet 40 mg  40 mg Oral Daily   • calcium carbonate (TUMS) chewable tablet 1,000 mg  1,000 mg Oral Daily PRN   • citalopram (CeleXA) tablet 20 mg  20 mg Oral Daily   • docusate sodium (COLACE) capsule 100 mg  100 mg Oral BID PRN   • donepezil (ARICEPT) tablet 5 mg  5 mg Oral BID   • hydrALAZINE (APRESOLINE) tablet 25 mg  25 mg Oral TID   • hydrochlorothiazide (HYDRODIURIL) tablet 25 mg  25 mg Oral Daily   • insulin lispro (HumaLOG) 100 units/mL subcutaneous injection 1-6 Units  1-6 Units Subcutaneous TID AC   • insulin lispro (HumaLOG) 100 units/mL subcutaneous injection 1-6 Units  1-6 Units Subcutaneous HS   • lidocaine (LIDODERM) 5 % patch 1 patch  1 patch Topical Daily   • losartan (COZAAR) tablet 50 mg  50 mg Oral Daily   • metoprolol succinate (TOPROL-XL) 24 hr tablet 100 mg  100 mg Oral Daily   • nicotine (NICODERM CQ) 14 mg/24hr TD 24 hr patch 14 mg  14 mg Transdermal Daily   • ondansetron (ZOFRAN) injection 4 mg  4 mg Intravenous Q6H PRN   • pantoprazole (PROTONIX) EC tablet 20 mg  20 mg Oral Early Morning   • senna (SENOKOT) tablet 17 2 mg  2 tablet Oral Daily PRN   • tamsulosin (FLOMAX) capsule 0 4 mg  0 4 mg Oral Daily With Dinner   • ticagrelor (BRILINTA) tablet 90 mg  90 mg Oral Q12H [address]       Prior to Admission medications    Medication Sig Start Date End Date Taking?  Authorizing Provider   amLODIPine (NORVASC) 10 mg tablet Take 10 mg by mouth daily   Yes Historical Provider, MD   apixaban (Eliquis) 5 mg Take 1 tablet (5 mg total) by mouth 2 (two) times a day 4/18/23  Yes Aminah WHYTE PA-C   aspirin (ECOTRIN LOW STRENGTH) 81 mg EC tablet Take 1 tablet (81 mg total) by mouth daily Do not start before April 19, 2023 4/19/23  Yes Aminah WHYTE PA-C   atorvastatin (LIPITOR) 40 mg tablet Take 40 mg by mouth daily with breakfast   Yes Historical Provider, MD   citalopram (CeleXA) 20 mg tablet Take 20 mg by mouth daily   Yes Historical Provider, MD   donepezil (ARICEPT) 5 mg tablet TAKE 1 TABLET TWICE A DAY 2/7/23  Yes Paula Chase MD   hydrALAZINE (APRESOLINE) 25 mg tablet Take 25 mg by mouth 3 (three) times a day   Yes Historical Provider, MD   hydrochlorothiazide (HYDRODIURIL) 25 mg tablet Take 25 mg by mouth daily   Yes Historical Provider, MD   lidocaine (Lidoderm) 5 % Apply 1 patch topically over 12 hours daily Remove & Discard patch within 12 hours or as directed by MD 4/26/23  Yes Merary Jaime DO   losartan (COZAAR) 50 mg tablet Take 50 mg by mouth daily   Yes Historical Provider, MD   metFORMIN (GLUCOPHAGE) 500 mg tablet Take 1 tablet (500 mg total) by mouth daily with breakfast 4/6/19  Yes Everardo Tellez MD   metoprolol succinate (TOPROL-XL) 100 mg 24 hr tablet Take 100 mg by mouth daily   Yes Historical Provider, MD   omeprazole (PriLOSEC OTC) 20 MG tablet Take 20 mg by mouth daily   Yes Historical Provider, MD   sildenafil (REVATIO) 20 mg tablet TAKE 2 3 TABLETS BY MOUTH AS NEEDED FOR SEXUAL ACTIVITY 7/12/19  Yes Historical Provider, MD   tamsulosin (FLOMAX) 0 4 mg Take 0 4 mg by mouth daily with breakfast   Yes Historical Provider, MD         VTE Pharmacologic Prophylaxis: as per primary  VTE Mechanical Prophylaxis: sequential compression device    ==  Jan Myers 27  Neurology Residency, PGY-2

## 2023-05-01 NOTE — RESTORATIVE TECHNICIAN NOTE
Restorative Technician Note      Patient Name: Lucas Fabian     Note Type: Mobility  Patient Position Upon Consult: Bedside chair  Activity Performed: Ambulated; [id number]; Stood  Assistive Device: Roller walker  Education Provided: Yes  Patient Position at [employer] of Consult: Bedside chair;  All needs within reach; Bed/Chair alarm activated      Sharmila REYNA, Restorative Technician, [employer]

## 2023-05-01 NOTE — SPEECH THERAPY NOTE
Speech Language/Pathology    Speech/Language Pathology Progress Note    Patient Name: Carrie Liu  UDWUQ'T Date: 5/1/2023     Problem List  Principal Problem:    Stroke [facility])  Active Problems:    Primary hypertension    Nicotine dependence    Adjustment reaction with anxiety    Chronic ischemic right MCA stroke    Chronic low back pain    Hyponatremia    Prediabetes    Leukocytosis       Past Medical History  Past Medical History:   Diagnosis Date   • Depression    • Diabetes mellitus ([address] )    • Dyslipidemia 03/26/2019   • Hyperlipidemia    • Hypertension    • Stroke [facility])         Past Surgical History  Past Surgical History:   Procedure Laterality Date   • BACK SURGERY     • IR STROKE ALERT  3/19/2019   • SHOULDER SURGERY           Subjective:  Pt awake and alert  OOB and in chair  Pt states he did not sleep well last night    Objective:  Pt seen for ongoing tx of expressive language disorder  Pt seen w/ writing and word finding activities  Pt achieved about 30% accuracy copying single words  Pt struggled to keep letters on the designated line  Single letters were written for a few of the prompts, such as 'o' for 'mop'  PT was unable to read the single words aloud, but if given the word in a sentence could spell 3 letter words w/ 75% accuracy (missing/distorting single letter from word)  When given prompt to identify word (you can write w/ a pencil, what else can you write w/?), pt required max verbal cues to produce word (I write w/ a p )  When asked to read article from magazine, pt struggled to find the passage and was unable to read it  Text was small, so trials w/ larger print are warranted  Assessment:  Pt continues to struggle w/ word finding and writing  Max verbal cues are requires to prompt pt to identify a word  Pt is able to spell short words w/ mod promptly  Pt unable to copy 3 letter words in writing  Writing intervention should continue to target short words/ letters   Recommend attempting reading w/ larger print  Plan/Recommendations:  Continue tx of writing and word finding disorders    Target short words/ letter in writing  Max cues currently required for word finding  ST to follow

## 2023-05-01 NOTE — ASSESSMENT & PLAN NOTE
· A1c 5 8  · Currently holding metformin while hospitalized, resume on discharge   · Carb controlled diet

## 2023-05-01 NOTE — PLAN OF CARE
Problem: NEUROSENSORY - ADULT  Goal: Achieves stable or improved neurological status  Description: INTERVENTIONS  - Monitor and report changes in neurological status  - Monitor vital signs such as temperature, blood pressure, glucose, and any other labs ordered   - Initiate measures to prevent increased intracranial pressure  - Monitor for seizure activity and implement precautions if appropriate      Outcome: Progressing  Goal: Achieves maximal functionality and self care  Description: INTERVENTIONS  - Monitor swallowing and airway patency with patient fatigue and changes in neurological status  - Encourage and assist patient to increase activity and self care     - Encourage visually impaired, hearing impaired and aphasic patients to use assistive/communication devices  Outcome: Progressing     Problem: CARDIOVASCULAR - ADULT  Goal: Maintains optimal cardiac output and hemodynamic stability  Description: INTERVENTIONS:  - Monitor I/O, vital signs and rhythm  - Monitor for S/S and trends of decreased cardiac output  - Administer and titrate ordered vasoactive medications to optimize hemodynamic stability  - Assess quality of pulses, skin color and temperature  - Assess for signs of decreased coronary artery perfusion  - Instruct patient to report change in severity of symptoms  Outcome: Progressing  Goal: Absence of cardiac dysrhythmias or at baseline rhythm  Description: INTERVENTIONS:  - Continuous cardiac monitoring, vital signs, obtain 12 lead EKG if ordered  - Administer antiarrhythmic and heart rate control medications as ordered  - Monitor electrolytes and administer replacement therapy as ordered  Outcome: Progressing     Problem: METABOLIC, FLUID AND ELECTROLYTES - ADULT  Goal: Electrolytes maintained within normal limits  Description: INTERVENTIONS:  - Monitor labs and assess patient for signs and symptoms of electrolyte imbalances  - Administer electrolyte replacement as ordered  - Monitor response to electrolyte replacements, including repeat lab results as appropriate  - Instruct patient on fluid and nutrition as appropriate  Outcome: Progressing  Goal: Glucose maintained within target range  Description: INTERVENTIONS:  - Monitor Blood Glucose as ordered  - Assess for signs and symptoms of hyperglycemia and hypoglycemia  - Administer ordered medications to maintain glucose within target range  - Assess nutritional intake and initiate nutrition service referral as needed  Outcome: Progressing

## 2023-05-01 NOTE — ASSESSMENT & PLAN NOTE
Reports chronic lower back pain x 4 weeks which has not improved despite multiple trails of steroids, nsaids and muscle relaxants  · XR of lumbar spine ordered by PCP: No lumbar vertebral body fracture or subluxation  Multilevel degenerative changes     · PRN tylenol, lidocaine patch   · PT/OT

## 2023-05-01 NOTE — ASSESSMENT & PLAN NOTE
· Per wife BP has been uncontrolled outpatient, compliance has not been ideal until she started managing his medications  · Continue with metoprolol 100mg daily, losartan 50mg daily, hydralazine 25mg TID and amlodipine 10mg daily, HCTZ 25mg   · BP stable, monitor routinely

## 2023-05-01 NOTE — PLAN OF CARE
Problem: PAIN - ADULT  Goal: Verbalizes/displays adequate comfort level or baseline comfort level  Description: Interventions:  - Encourage patient to monitor pain and request assistance  - Assess pain using appropriate pain scale  - Administer analgesics based on type and severity of pain and evaluate response  - Implement non-pharmacological measures as appropriate and evaluate response  - Consider cultural and social influences on pain and pain management  - Notify physician/advanced practitioner if interventions unsuccessful or patient reports new pain  Outcome: Progressing     Problem: INFECTION - ADULT  Goal: Absence or prevention of progression during hospitalization  Description: INTERVENTIONS:  - Assess and monitor for signs and symptoms of infection  - Monitor lab/diagnostic results  - Monitor all insertion sites, i e  indwelling lines, tubes, and drains  - Monitor endotracheal if appropriate and nasal secretions for changes in amount and color  - [place] appropriate cooling/warming therapies per order  - Administer medications as ordered  - Instruct and encourage patient and family to use good hand hygiene technique  - Identify and instruct in appropriate isolation precautions for identified infection/condition  Outcome: Progressing     Problem: SAFETY ADULT  Goal: Patient will remain free of falls  Description: INTERVENTIONS:  - Educate patient/family on patient safety including physical limitations  - Instruct patient to call for assistance with activity   - Consult OT/PT to assist with strengthening/mobility   - Keep Call bell within reach  - Keep bed low and locked with side rails adjusted as appropriate  - Keep care items and personal belongings within reach  - Initiate and maintain comfort rounds  - Make Fall Risk Sign visible to staff  - Offer Toileting every 2 Hours, in advance of need  - Initiate/Maintain bed alarm  - Apply yellow socks and bracelet for high fall risk patients  - Consider moving patient to room near nurses station  Outcome: Progressing     Problem: DISCHARGE PLANNING  Goal: Discharge to home or other facility with appropriate resources  Description: INTERVENTIONS:  - Identify barriers to discharge w/patient and caregiver  - Arrange for needed discharge resources and transportation as appropriate  - Identify discharge learning needs (meds, wound care, etc )  - Arrange for interpretive services to assist at discharge as needed  - Refer to Case Management Department for coordinating discharge planning if the patient needs post-hospital services based on physician/advanced practitioner order or complex needs related to functional status, cognitive ability, or social support system  Outcome: Progressing     Problem: Knowledge Deficit  Goal: Patient/family/caregiver demonstrates understanding of disease process, treatment plan, medications, and discharge instructions  Description: Complete learning assessment and assess knowledge base  Interventions:  - Provide teaching at level of understanding  - Provide teaching via preferred learning methods  Outcome: Progressing     Problem: NEUROSENSORY - ADULT  Goal: Achieves stable or improved neurological status  Description: INTERVENTIONS  - Monitor and report changes in neurological status  - Monitor vital signs such as temperature, blood pressure, glucose, and any other labs ordered   - Initiate measures to prevent increased intracranial pressure  - Monitor for seizure activity and implement precautions if appropriate      Outcome: Progressing  Goal: Achieves maximal functionality and self care  Description: INTERVENTIONS  - Monitor swallowing and airway patency with patient fatigue and changes in neurological status  - Encourage and assist patient to increase activity and self care     - Encourage visually impaired, hearing impaired and aphasic patients to use assistive/communication devices  Outcome: Progressing     Problem: CARDIOVASCULAR - ADULT  Goal: Maintains optimal cardiac output and hemodynamic stability  Description: INTERVENTIONS:  - Monitor I/O, vital signs and rhythm  - Monitor for S/S and trends of decreased cardiac output  - Administer and titrate ordered vasoactive medications to optimize hemodynamic stability  - Assess quality of pulses, skin color and temperature  - Assess for signs of decreased coronary artery perfusion  - Instruct patient to report change in severity of symptoms  Outcome: Progressing  Goal: Absence of cardiac dysrhythmias or at baseline rhythm  Description: INTERVENTIONS:  - Continuous cardiac monitoring, vital signs, obtain 12 lead EKG if ordered  - Administer antiarrhythmic and heart rate control medications as ordered  - Monitor electrolytes and administer replacement therapy as ordered  Outcome: Progressing     Problem: METABOLIC, FLUID AND ELECTROLYTES - ADULT  Goal: Electrolytes maintained within normal limits  Description: INTERVENTIONS:  - Monitor labs and assess patient for signs and symptoms of electrolyte imbalances  - Administer electrolyte replacement as ordered  - Monitor response to electrolyte replacements, including repeat lab results as appropriate  - Instruct patient on fluid and nutrition as appropriate  Outcome: Progressing  Goal: Glucose maintained within target range  Description: INTERVENTIONS:  - Monitor Blood Glucose as ordered  - Assess for signs and symptoms of hyperglycemia and hypoglycemia  - Administer ordered medications to maintain glucose within target range  - Assess nutritional intake and initiate nutrition service referral as needed  Outcome: Progressing     Problem: MOBILITY - ADULT  Goal: Maintain or return to baseline ADL function  Description: INTERVENTIONS:  -  Assess patient's ability to carry out ADLs; assess patient's baseline for ADL function and identify physical deficits which impact ability to perform ADLs (bathing, care of mouth/teeth, toileting, grooming, dressing, etc )  - Assess/evaluate cause of self-care deficits   - Assess range of motion  - Assess patient's mobility; develop plan if impaired  - Assess patient's need for assistive devices and provide as appropriate  - Encourage maximum independence but intervene and supervise when necessary  - Involve family in performance of ADLs  - Assess for home care needs following discharge   - Consider OT consult to assist with ADL evaluation and planning for discharge  - Provide patient education as appropriate  Outcome: Progressing  Goal: Maintains/Returns to pre admission functional level  Description: INTERVENTIONS:  - Perform BMAT or MOVE assessment daily    - Set and communicate daily mobility goal to care team and patient/family/caregiver  - Collaborate with rehabilitation services on mobility goals if consulted  - Perform Range of Motion 2 times a day  - Reposition patient every 2 hours    - Dangle patient 2 times a day  - Stand patient 2 times a day  - Ambulate patient 2 times a day  - Out of bed to chair 2 times a day   - Out of bed for meals 2 times a day  - Out of bed for toileting  - Record patient progress and toleration of activity level   Outcome: Progressing

## 2023-05-02 ENCOUNTER — ANESTHESIA (INPATIENT)
Dept: NON INVASIVE DIAGNOSTICS | Facility: HOSPITAL | Age: 63
End: 2023-05-02

## 2023-05-02 ENCOUNTER — APPOINTMENT (INPATIENT)
Dept: NON INVASIVE DIAGNOSTICS | Facility: HOSPITAL | Age: 63
End: 2023-05-02

## 2023-05-02 ENCOUNTER — ANESTHESIA EVENT (INPATIENT)
Dept: NON INVASIVE DIAGNOSTICS | Facility: HOSPITAL | Age: 63
End: 2023-05-02

## 2023-05-02 ENCOUNTER — TELEPHONE (OUTPATIENT)
Dept: RADIOLOGY | Facility: HOSPITAL | Age: 63
End: 2023-05-02

## 2023-05-02 ENCOUNTER — APPOINTMENT (INPATIENT)
Dept: RADIOLOGY | Facility: HOSPITAL | Age: 63
End: 2023-05-02

## 2023-05-02 LAB
ANION GAP SERPL CALCULATED.3IONS-SCNC: 6 MMOL/L (ref 4–13)
ASCENDING AORTA: 3 CM
BUN SERPL-MCNC: 41 MG/DL (ref 5–25)
CALCIUM SERPL-MCNC: 9.8 MG/DL (ref 8.3–10.1)
CHLORIDE SERPL-SCNC: 102 MMOL/L (ref 96–108)
CO2 SERPL-SCNC: 26 MMOL/L (ref 21–32)
CREAT SERPL-MCNC: 1.2 MG/DL (ref 0.6–1.3)
ERYTHROCYTE [DISTWIDTH] IN BLOOD BY AUTOMATED COUNT: 12.9 % (ref 11.6–15.1)
GFR SERPL CREATININE-BSD FRML MDRD: 63 ML/MIN/1.73SQ M
GLUCOSE SERPL-MCNC: 115 MG/DL (ref 65–140)
GLUCOSE SERPL-MCNC: 127 MG/DL (ref 65–140)
GLUCOSE SERPL-MCNC: 141 MG/DL (ref 65–140)
GLUCOSE SERPL-MCNC: 94 MG/DL (ref 65–140)
GLUCOSE SERPL-MCNC: 98 MG/DL (ref 65–140)
HCT VFR BLD AUTO: 43.3 % (ref 36.5–49.3)
HGB BLD-MCNC: 14.9 G/DL (ref 12–17)
MCH RBC QN AUTO: 32 PG (ref 26.8–34.3)
MCHC RBC AUTO-ENTMCNC: 34.4 G/DL (ref 31.4–37.4)
MCV RBC AUTO: 93 FL (ref 82–98)
PLATELET # BLD AUTO: 290 THOUSANDS/UL (ref 149–390)
PMV BLD AUTO: 10.4 FL (ref 8.9–12.7)
POTASSIUM SERPL-SCNC: 3.3 MMOL/L (ref 3.5–5.3)
RBC # BLD AUTO: 4.66 MILLION/UL (ref 3.88–5.62)
SCAN RESULT: NORMAL
SL CV LV EF: 55
SODIUM SERPL-SCNC: 134 MMOL/L (ref 135–147)
WBC # BLD AUTO: 11.98 THOUSAND/UL (ref 4.31–10.16)

## 2023-05-02 RX ORDER — DOCUSATE SODIUM 100 MG/1
100 CAPSULE, LIQUID FILLED ORAL 2 TIMES DAILY
Status: DISCONTINUED | OUTPATIENT
Start: 2023-05-02 | End: 2023-05-06

## 2023-05-02 RX ORDER — GLYCOPYRROLATE 0.2 MG/ML
INJECTION INTRAMUSCULAR; INTRAVENOUS AS NEEDED
Status: DISCONTINUED | OUTPATIENT
Start: 2023-05-02 | End: 2023-05-02

## 2023-05-02 RX ORDER — KETAMINE HCL IN NACL, ISO-OSM 100MG/10ML
SYRINGE (ML) INJECTION AS NEEDED
Status: DISCONTINUED | OUTPATIENT
Start: 2023-05-02 | End: 2023-05-02

## 2023-05-02 RX ORDER — ONDANSETRON 2 MG/ML
INJECTION INTRAMUSCULAR; INTRAVENOUS AS NEEDED
Status: DISCONTINUED | OUTPATIENT
Start: 2023-05-02 | End: 2023-05-02

## 2023-05-02 RX ORDER — METHOCARBAMOL 500 MG/1
500 TABLET, FILM COATED ORAL EVERY 6 HOURS SCHEDULED
Status: DISCONTINUED | OUTPATIENT
Start: 2023-05-02 | End: 2023-05-07

## 2023-05-02 RX ORDER — CALCIUM CHLORIDE 100 MG/ML
INJECTION INTRAVENOUS; INTRAVENTRICULAR AS NEEDED
Status: DISCONTINUED | OUTPATIENT
Start: 2023-05-02 | End: 2023-05-02

## 2023-05-02 RX ORDER — PROPOFOL 10 MG/ML
INJECTION, EMULSION INTRAVENOUS CONTINUOUS PRN
Status: DISCONTINUED | OUTPATIENT
Start: 2023-05-02 | End: 2023-05-02

## 2023-05-02 RX ORDER — ACETAMINOPHEN 325 MG/1
975 TABLET ORAL EVERY 8 HOURS SCHEDULED
Status: DISCONTINUED | OUTPATIENT
Start: 2023-05-02 | End: 2023-05-12 | Stop reason: HOSPADM

## 2023-05-02 RX ORDER — OXYCODONE HYDROCHLORIDE 5 MG/1
5 TABLET ORAL 3 TIMES DAILY PRN
Status: DISCONTINUED | OUTPATIENT
Start: 2023-05-02 | End: 2023-05-02

## 2023-05-02 RX ORDER — OXYCODONE HYDROCHLORIDE 10 MG/1
10 TABLET ORAL EVERY 4 HOURS PRN
Status: DISCONTINUED | OUTPATIENT
Start: 2023-05-02 | End: 2023-05-10

## 2023-05-02 RX ORDER — SODIUM CHLORIDE 9 MG/ML
INJECTION, SOLUTION INTRAVENOUS CONTINUOUS PRN
Status: DISCONTINUED | OUTPATIENT
Start: 2023-05-02 | End: 2023-05-02

## 2023-05-02 RX ORDER — POTASSIUM CHLORIDE 20 MEQ/1
20 TABLET, EXTENDED RELEASE ORAL ONCE
Status: COMPLETED | OUTPATIENT
Start: 2023-05-02 | End: 2023-05-02

## 2023-05-02 RX ORDER — OXYCODONE HYDROCHLORIDE 5 MG/1
5 TABLET ORAL EVERY 4 HOURS PRN
Status: DISCONTINUED | OUTPATIENT
Start: 2023-05-02 | End: 2023-05-10

## 2023-05-02 RX ORDER — EPHEDRINE SULFATE 50 MG/ML
INJECTION INTRAVENOUS AS NEEDED
Status: DISCONTINUED | OUTPATIENT
Start: 2023-05-02 | End: 2023-05-02

## 2023-05-02 RX ORDER — POTASSIUM CHLORIDE 14.9 MG/ML
20 INJECTION INTRAVENOUS ONCE
Status: COMPLETED | OUTPATIENT
Start: 2023-05-02 | End: 2023-05-02

## 2023-05-02 RX ORDER — PROPOFOL 10 MG/ML
INJECTION, EMULSION INTRAVENOUS AS NEEDED
Status: DISCONTINUED | OUTPATIENT
Start: 2023-05-02 | End: 2023-05-02

## 2023-05-02 RX ADMIN — CALCIUM CHLORIDE 1 G: 100 INJECTION INTRAVENOUS; INTRAVENTRICULAR at 14:15

## 2023-05-02 RX ADMIN — LOSARTAN POTASSIUM 50 MG: 50 TABLET, FILM COATED ORAL at 08:51

## 2023-05-02 RX ADMIN — DOCUSATE SODIUM 100 MG: 100 CAPSULE, LIQUID FILLED ORAL at 09:21

## 2023-05-02 RX ADMIN — ONDANSETRON 4 MG: 2 INJECTION INTRAMUSCULAR; INTRAVENOUS at 13:44

## 2023-05-02 RX ADMIN — METHOCARBAMOL 500 MG: 500 TABLET ORAL at 15:33

## 2023-05-02 RX ADMIN — PROPOFOL 50 MG: 10 INJECTION, EMULSION INTRAVENOUS at 13:53

## 2023-05-02 RX ADMIN — DONEPEZIL HYDROCHLORIDE 5 MG: 5 TABLET ORAL at 17:40

## 2023-05-02 RX ADMIN — APIXABAN 5 MG: 5 TABLET, FILM COATED ORAL at 08:51

## 2023-05-02 RX ADMIN — AMLODIPINE BESYLATE 10 MG: 10 TABLET ORAL at 08:51

## 2023-05-02 RX ADMIN — GLYCOPYRROLATE 0.1 MG: 0.2 INJECTION, SOLUTION INTRAMUSCULAR; INTRAVENOUS at 13:44

## 2023-05-02 RX ADMIN — TAMSULOSIN HYDROCHLORIDE 0.4 MG: 0.4 CAPSULE ORAL at 15:31

## 2023-05-02 RX ADMIN — POTASSIUM CHLORIDE 20 MEQ: 14.9 INJECTION, SOLUTION INTRAVENOUS at 08:50

## 2023-05-02 RX ADMIN — ATORVASTATIN CALCIUM 40 MG: 40 TABLET, FILM COATED ORAL at 08:52

## 2023-05-02 RX ADMIN — PROPOFOL 40 MCG/KG/MIN: 10 INJECTION, EMULSION INTRAVENOUS at 13:55

## 2023-05-02 RX ADMIN — ACETAMINOPHEN 975 MG: 325 TABLET ORAL at 15:23

## 2023-05-02 RX ADMIN — TICAGRELOR 90 MG: 90 TABLET ORAL at 22:00

## 2023-05-02 RX ADMIN — POTASSIUM CHLORIDE 20 MEQ: 1500 TABLET, EXTENDED RELEASE ORAL at 15:23

## 2023-05-02 RX ADMIN — EPHEDRINE SULFATE 20 MG: 50 INJECTION INTRAVENOUS at 14:13

## 2023-05-02 RX ADMIN — EPHEDRINE SULFATE 30 MG: 50 INJECTION INTRAVENOUS at 14:26

## 2023-05-02 RX ADMIN — EPHEDRINE SULFATE 20 MG: 50 INJECTION INTRAVENOUS at 14:17

## 2023-05-02 RX ADMIN — PROPOFOL 20 MG: 10 INJECTION, EMULSION INTRAVENOUS at 13:56

## 2023-05-02 RX ADMIN — LIDOCAINE 5% 1 PATCH: 700 PATCH TOPICAL at 08:50

## 2023-05-02 RX ADMIN — TICAGRELOR 90 MG: 90 TABLET ORAL at 09:21

## 2023-05-02 RX ADMIN — OXYCODONE HYDROCHLORIDE 5 MG: 5 TABLET ORAL at 21:41

## 2023-05-02 RX ADMIN — APIXABAN 5 MG: 5 TABLET, FILM COATED ORAL at 17:40

## 2023-05-02 RX ADMIN — EPHEDRINE SULFATE 20 MG: 50 INJECTION INTRAVENOUS at 14:24

## 2023-05-02 RX ADMIN — EPHEDRINE SULFATE 10 MG: 50 INJECTION INTRAVENOUS at 13:57

## 2023-05-02 RX ADMIN — Medication 10 MG: at 14:01

## 2023-05-02 RX ADMIN — Medication 10 MG: at 13:55

## 2023-05-02 RX ADMIN — SODIUM CHLORIDE: 0.9 INJECTION, SOLUTION INTRAVENOUS at 13:16

## 2023-05-02 RX ADMIN — HYDRALAZINE HYDROCHLORIDE 25 MG: 25 TABLET, FILM COATED ORAL at 15:23

## 2023-05-02 RX ADMIN — DOCUSATE SODIUM 100 MG: 100 CAPSULE, LIQUID FILLED ORAL at 17:40

## 2023-05-02 RX ADMIN — PANTOPRAZOLE SODIUM 20 MG: 20 TABLET, DELAYED RELEASE ORAL at 05:02

## 2023-05-02 RX ADMIN — HYDRALAZINE HYDROCHLORIDE 25 MG: 25 TABLET, FILM COATED ORAL at 08:51

## 2023-05-02 RX ADMIN — ACETAMINOPHEN 975 MG: 325 TABLET ORAL at 21:40

## 2023-05-02 RX ADMIN — METHOCARBAMOL 500 MG: 500 TABLET ORAL at 23:55

## 2023-05-02 RX ADMIN — OXYCODONE HYDROCHLORIDE 5 MG: 5 TABLET ORAL at 08:52

## 2023-05-02 RX ADMIN — Medication 30 MG: at 13:49

## 2023-05-02 RX ADMIN — HYDROCHLOROTHIAZIDE 25 MG: 25 TABLET ORAL at 08:51

## 2023-05-02 RX ADMIN — HYDRALAZINE HYDROCHLORIDE 25 MG: 25 TABLET, FILM COATED ORAL at 21:41

## 2023-05-02 RX ADMIN — METOPROLOL SUCCINATE 100 MG: 100 TABLET, EXTENDED RELEASE ORAL at 08:51

## 2023-05-02 RX ADMIN — CITALOPRAM HYDROBROMIDE 20 MG: 20 TABLET ORAL at 08:51

## 2023-05-02 RX ADMIN — METHOCARBAMOL 500 MG: 500 TABLET ORAL at 08:52

## 2023-05-02 RX ADMIN — DONEPEZIL HYDROCHLORIDE 5 MG: 5 TABLET ORAL at 08:51

## 2023-05-02 RX ADMIN — TOPICAL ANESTHETIC 1 SPRAY: 200 SPRAY DENTAL; PERIODONTAL at 13:49

## 2023-05-02 RX ADMIN — METHOCARBAMOL 500 MG: 500 TABLET ORAL at 17:40

## 2023-05-02 NOTE — PROGRESS NOTES
NEUROLOGY RESIDENCY PROGRESS NOTE     Name: Michele Oliver   Age & Sex: 58 y o  male   MRN: [id number]  Unit/Bed#: [facility] 703-01   Encounter: [id number]    Michele Oliver will need follow up in in 6 weeks with neurovascular attending  He will not require outpatient neurological testing, at this time  ASSESSMENT & PLAN     * Stroke [facility])  Assessment & Plan  58year old male with HTN, HLD, DM2, prior R MCA CVA s/p thrombectomy in March 2019 w/ no etiology found despite years of loop recorder (and was maintained on Plavix 75mg after that), known severe R M1 stenosis, prior tobacco use, recent L MCA stroke (had aphasia and was empirically AC eliquis 5mg BID w/ ASA 81mg at that time due to ESUSx2) 04/16/2023 (was on came in as a Stroke alert on 4/26/2023  3:30 PM with initial NIHSS of 2 (for LOC questions) and LKW 2:30pm, initial Blood Pressure: 154/71  Initial presenting deficits were L facial droop, R sided weakness, and AMS but on actual exam confused/altered unable to give correct month and correct age    As a result of past stroke within the past month and on eliquis pt was determined to not be a candidate for thrombolysis (TNK)  Has had previous Loop recorder that was negative for any arrhythmias  While here patient had noted new multifocal strokes  Switched from ASA 81mg to Brilinta 90mg BID  prior work-up including IVY (no PFO) and loop recorder without evidence of Afib  CT of chest abdomen and pelvis with no evidence of malignancy 04/17/2023  Thrombosis panel done in 04/18/2023 that was only abnl for AT3 that was low in the acute setting  Previous TTE 04/18/23 showed ED of 55% and nl wall motion and mildly dilated L atrium       • Exam on 05/01/23: appears aphasic unable to give correct month and correct age and expresses frustration, more frontal aphasia, paraphasic errors, R homonymous hemianopia, R leaning gait and small steppage   • Current Blood Pressure: 137/71, BP over 24 hours: BP Min: 110/89  Max: 137/71   • Vascular risk factors: past L MCA and R MCA stroke, DM, HTN, HLD  • Home meds: eliquis 5mg BID, ASA 81mg     Workup:  Lab Results   Component Value Date    HGBA1C 5 8 (H) 04/27/2023    CHOLESTEROL 109 04/27/2023    LDLCALC 40 04/27/2023    TRIG 166 (H) 04/27/2023    INR 1 15 04/26/2023      • CTH: No acute intracranial abnormality  Chronic microangiopathy  Small evolving subacute infarct in the left parietal periventricular white matter  Other small recent infarcts better visualized on previous MRI  • CTA: Stable moderate (60 to 65%) stenosis in the bilateral internal carotid arteries  Stable severe stenosis distal right M1 segment  Stable severe stenosis proximal left M2    • MRI: Compared to the prior recent MRI examination dated 4/17/2023, interval increase in size of diffusion abnormality in the left parietal subcortical and periventricular white matter consistent with acute to subacute ischemia  New small focus of gyral diffusion abnormality in the right frontal parietal region (4:20)  Persistent additional small foci of diffusion abnormality in the left periatrial and parieto-occipital region  No acute hemorrhage or midline shift  Stable moderate chronic microangiopathic changes within the brain  • Echocardiogram: EF of 55% and nl wall motion and mildly dilated L atrium  • IVY in 2019 nl and no soruce of cardiac embolism; LEFT ATRIUM: Size was normal  No thrombus was identified  APPENDAGE: The appendage was small  No thrombus was identified  DOPPLER: The function was mildly reduced (mildly reduced emptying velocity)     • Telemetry: negative  • D-Dimer: 1 13 (elevated)  • ESR: 29  • CRP: 7 9  • Carotid Duplex pending  • MRI brain w/ and w/o pending   • IVY pending     Pertinent scores:  - NIHSS: 2  Stroke Modified Prowers Score: 0 (No baseline symptoms/disability)    Impression: New multiple strokes difficult to state whether cardioembolic give dilated L atrium as well as noted Severe atherosclerosis in L and R MCA so cardioembolic vs atherembolic vs mixed picture vs central cause, further work-up needs to be done at this time    Plan:  - Discussed plan with neurology attending, Dr Cristiane Dhillon  - pending MRI brain w/ and w/o contrast    - will give oxy IR 30 minutes prior to help w/ back pain  - pending serum flow cytometry, carotid duplex ultrasound  - pending IVY at 1:30pm 05/02 to evaluate heart for any abnl (ie endocarditis, fibroelastoma, etc ) not appreciated on TTE   - Antiplatelet agents: continue BRILINTA 90mg BID   - given that patient was on plavix when he had his stroke on 04/16/2023  - Continue home AC eliquis 5mg BID  - Given noted past history of prior cognitive impairment and previous MOCA of 18/30 in past neurology outpatient visits, would recommend outpatient neurocognitive evaluation  - BP: allow for normotension  - atorvastatin 40mg qhs  - Maintain glucose <180, SSI for coverage if indicated  - Medical management as per primary team appreciated  - continue w/ correction of any toxic/metabolic abnormalities  - DVT ppx and SCDs  - Monitor on telemetry  - PT/OT/Speech/PM&R input appreciated   - patient would likely benefit from speech therapy outpatient  - Stroke education  - rest of care as per primary team                SUBJECTIVE     Patient was seen and examined  Patient had no complaints except noted that he had trouble urinating and then urinated in the middle of my examination  Patient otherwise seems to be eager to get out of the hospital  He stated that he had back pain and is why he could not tolerate the MRI  He stated he has chronic back pain for some time and is isolated to the lower back and does not radiate  During rounds, patient was more tired as he had gotten some pain meds for his back pain  Expected IVY later today and repeat MRI   Patient will get oxy prior to ensure he can tolerate the procedure due to his back pain        He denies any headaches, syncope, paresthesias, diplopia, visual changes, tinnitus, sudden onset weakness, garbled speech, dysarthria/slurring of words,  Loss of bowel or bladder      Review of Systems   Unable to perform ROS: Other     Patient is aphasic so unreliable historian     OBJECTIVE     Patient ID: Lady Banegas is a 58 y o  male  Vitals:    23 1625 23 2100 23 2138 23 0702   BP: 114/67  119/53 116/78   BP Location:   Right arm    Pulse: 68  75 91   Resp:   18 16   Temp:   98 4 °F (36 9 °C) 98 2 °F (36 8 °C)   TempSrc:   Oral    SpO2: 95% 97% 96% 95%   Weight:          Temperature:   Temp (24hrs), Av 3 °F (36 8 °C), Min:98 2 °F (36 8 °C), Max:98 4 °F (36 9 °C)    Temperature: 98 2 °F (36 8 °C)      Physical Exam     Neurologic Exam     Gait, Coordination, and Reflexes     Gait  Gait: wide-based         Physical Exam  Vitals and nursing note reviewed  Constitutional:       General: He is not in acute distress  Appearance: He is well-developed  HENT:      Head: Normocephalic and atraumatic  Eyes:      Extraocular Movements: EOM normal       Conjunctiva/sclera: Conjunctivae normal       Pupils: Pupils are equal, round, and reactive to light  Cardiovascular:      Rate and Rhythm: Normal rate  Pulmonary:      Effort: Pulmonary effort is normal    Abdominal:      Palpations: Abdomen is soft  Tenderness: There is no abdominal tenderness  Musculoskeletal:         General: No swelling  Cervical back: Neck supple  Skin:     General: Skin is warm and dry  Capillary Refill: Capillary refill takes less than 2 seconds  Neurological:      Mental Status: He is alert and oriented to person, place, and time  Motor: Motor strength is normal       Coordination: Finger-Nose-Finger Test and Heel to [facility] Test normal       Deep Tendon Reflexes:      Reflex Scores:       Tricep reflexes are 2+ on the right side and 2+ on the left side         Bicep reflexes are 2+ on the right side and 2+ on the left side  Brachioradialis reflexes are 2+ on the right side and 2+ on the left side  Patellar reflexes are 2+ on the right side and 2+ on the left side  Achilles reflexes are 2+ on the right side and 2+ on the left side  Psychiatric:         Mood and Affect: Mood normal          Speech: Speech normal          Neurologic Exam      Mental Status   Oriented to person, place,    Disoriented to time and season  Speech: speech is normal   Knowledge: good  Able to name object  Able to repeat  Some paraphasic errors and perseveration    Was able to name clock but got time incorrect   Had difficulty w/ 2-step commands   Could do simple math (2+2) but had trouble naming how many quarters in $1 00  No apraxia appreciated  Patient was able to repeat without any issues    Cranial Nerves      CN II   BTT intact bilaterally      CN III, IV, VI   Pupils are equal, round, and reactive to light  Extraocular motions are normal       CN V   Facial sensation intact       CN VII   Facial expression full, symmetric       CN VIII   CN VIII normal       CN IX, X   CN IX normal    CN X normal       CN XI   CN XI normal       CN XII   CN XII normal       Motor Exam   Overall muscle tone: normal     Strength   Strength 5/5 throughout       Sensory Exam   Light touch normal    Pinprick normal       Gait, Coordination, and Reflexes      Coordination   Finger to nose coordination: Patient was able to do finger-nose but had very to much difficulty following the problems particular with his left side  Heel to shin coordination: normal     Reflexes   Right brachioradialis: 2+  Left brachioradialis: 2+  Right biceps: 2+  Left biceps: 2+  Right triceps: 2+  Left triceps: 2+  Right patellar: 2+  Left patellar: 2+  Right achilles: 2+  Left achilles: 2+  Right plantar: normal  Left plantar: normal  Right ankle clonus: absent  Left ankle clonus: absent     On gait exam noted may be can noted wide-based gait patient was leaning on the right and is taking smaller steps with his right leg and dragging RLE when using the walker      LABORATORY DATA     Labs: I have personally reviewed pertinent reports  and I have personally reviewed pertinent films in PACS  Results from last 7 days   Lab Units 05/02/23  0524 05/01/23  0617 04/28/23  0522   WBC Thousand/uL 11 98* 11 96* 11 59*   HEMOGLOBIN g/dL 14 9 15 6 15 9   HEMATOCRIT % 43 3 46 1 46 2   PLATELETS Thousands/uL 290 292 294      Results from last 7 days   Lab Units 05/02/23  0524 05/01/23  0617 04/29/23  0510   SODIUM mmol/L 134* 132* 133*   POTASSIUM mmol/L 3 3* 4 7 3 4*   CHLORIDE mmol/L 102 101 101   CO2 mmol/L 26 27 28   BUN mg/dL 41* 36* 29*   CREATININE mg/dL 1 20 1 19 1 10   CALCIUM mg/dL 9 8 9 8 9 5     Results from last 7 days   Lab Units 04/27/23  0517 04/26/23  1540   MAGNESIUM mg/dL 2 4 2 4     Results from last 7 days   Lab Units 04/27/23  0517 04/26/23  1540   PHOSPHORUS mg/dL 3 6 3 5      Results from last 7 days   Lab Units 04/26/23  1540   INR  1 15   PTT seconds 34               IMAGING & DIAGNOSTIC TESTING     Radiology Results: I have personally reviewed pertinent reports  and I have personally reviewed pertinent films in PACS    XR chest pa & lateral   Final Result by Jose Shin MD (04/28 1019)      No acute cardiopulmonary disease  Workstation performed: [id number]         MRI brain wo contrast   Final Result by Maribel Nash MD (04/27 5828)      Compared to the prior recent MRI examination dated 4/17/2023, interval increase in size of diffusion abnormality in the left parietal subcortical and periventricular white matter consistent with acute to subacute ischemia  New small focus of gyral diffusion abnormality in the right frontal parietal region (4:20)  Persistent additional small foci of diffusion abnormality in the left periatrial and parieto-occipital region  No acute hemorrhage or midline shift  Stable moderate chronic microangiopathic changes within the brain  Study was marked in [facility] for immediate notification  Workstation performed: [id number]         CTA stroke alert (head/neck)   Final Result by SUSAN Julien MD (04/26 1611)   Stable moderate (60 to 65%) stenosis in the bilateral internal carotid arteries  Stable severe stenosis distal right M1 segment  Stable severe stenosis proximal left M2  No new intracranial stenosis or large vessel occlusion  Findings were directly discussed with Elfreda Balloon at 3:42 p m  Workstation performed: [id number]         CT stroke alert brain   Final Result by SUSAN Julien MD (04/26 7004)      No acute intracranial abnormality  Chronic microangiopathy  Small evolving subacute infarct in the left parietal periventricular white matter  Other small recent infarcts better visualized on previous MRI  Findings were directly discussed with Elfreda Balloon at 3:42 p m  Workstation performed: [id number]         MRI follow up neuro    (Results Pending)   MRI inpatient order    (Results Pending)   MRI inpatient order    (Results Pending)   VAS carotid complete study    (Results Pending)   VAS lower limb venous duplex study, complete bilateral    (Results Pending)       Other Diagnostic Testing: I have personally reviewed pertinent reports     and I have personally reviewed pertinent films in PACS    ACTIVE MEDICATIONS     Current Facility-Administered Medications   Medication Dose Route Frequency   • acetaminophen (TYLENOL) tablet 975 mg  975 mg Oral Q8H [address]   • amLODIPine (NORVASC) tablet 10 mg  10 mg Oral Daily   • apixaban (ELIQUIS) tablet 5 mg  5 mg Oral BID   • atorvastatin (LIPITOR) tablet 40 mg  40 mg Oral Daily   • calcium carbonate (TUMS) chewable tablet 1,000 mg  1,000 mg Oral Daily PRN   • citalopram (CeleXA) tablet 20 mg  20 mg Oral Daily   • docusate sodium (COLACE) capsule 100 mg  100 mg Oral BID   • donepezil (ARICEPT) tablet 5 mg  5 mg Oral BID   • hydrALAZINE (APRESOLINE) tablet 25 mg  25 mg Oral TID   • hydrochlorothiazide (HYDRODIURIL) tablet 25 mg  25 mg Oral Daily   • insulin lispro (HumaLOG) 100 units/mL subcutaneous injection 1-6 Units  1-6 Units Subcutaneous TID AC   • insulin lispro (HumaLOG) 100 units/mL subcutaneous injection 1-6 Units  1-6 Units Subcutaneous HS   • lidocaine (LIDODERM) 5 % patch 1 patch  1 patch Topical Daily   • losartan (COZAAR) tablet 50 mg  50 mg Oral Daily   • methocarbamol (ROBAXIN) tablet 500 mg  500 mg Oral Q6H JOSE   • metoprolol succinate (TOPROL-XL) 24 hr tablet 100 mg  100 mg Oral Daily   • nicotine (NICODERM CQ) 14 mg/24hr TD 24 hr patch 14 mg  14 mg Transdermal Daily   • ondansetron (ZOFRAN) injection 4 mg  4 mg Intravenous Q6H PRN   • oxyCODONE (ROXICODONE) immediate release tablet 10 mg  10 mg Oral Q4H PRN   • oxyCODONE (ROXICODONE) IR tablet 5 mg  5 mg Oral Q4H PRN   • pantoprazole (PROTONIX) EC tablet 20 mg  20 mg Oral Early Morning   • potassium chloride (K-DUR,KLOR-CON) CR tablet 20 mEq  20 mEq Oral Once   • senna (SENOKOT) tablet 17 2 mg  2 tablet Oral Daily PRN   • tamsulosin (FLOMAX) capsule 0 4 mg  0 4 mg Oral Daily With Dinner   • ticagrelor (BRILINTA) tablet 90 mg  90 mg Oral Q12H [facility]       Prior to Admission medications    Medication Sig Start Date End Date Taking?  Authorizing Provider   amLODIPine (NORVASC) 10 mg tablet Take 10 mg by mouth daily   Yes Historical Provider, MD   apixaban (Eliquis) 5 mg Take 1 tablet (5 mg total) by mouth 2 (two) times a day 4/18/23  Yes Aminah WHYTE PA-C   aspirin (ECOTRIN LOW STRENGTH) 81 mg EC tablet Take 1 tablet (81 mg total) by mouth daily Do not start before April 19, 2023 4/19/23  Yes Aminah WHYTE PA-C   atorvastatin (LIPITOR) 40 mg tablet Take 40 mg by mouth daily with breakfast   Yes Historical Provider, MD   citalopram (CeleXA) 20 mg tablet Take 20 mg by mouth daily   Yes Historical Provider, MD donepezil (ARICEPT) 5 mg tablet TAKE 1 TABLET TWICE A DAY 2/7/23  Yes Davi Bello MD   hydrALAZINE (APRESOLINE) 25 mg tablet Take 25 mg by mouth 3 (three) times a day   Yes Historical Provider, MD   hydrochlorothiazide (HYDRODIURIL) 25 mg tablet Take 25 mg by mouth daily   Yes Historical Provider, MD   lidocaine (Lidoderm) 5 % Apply 1 patch topically over 12 hours daily Remove & Discard patch within 12 hours or as directed by MD 4/26/23  Yes Guicho Larkin DO   losartan (COZAAR) 50 mg tablet Take 50 mg by mouth daily   Yes Historical Provider, MD   metFORMIN (GLUCOPHAGE) 500 mg tablet Take 1 tablet (500 mg total) by mouth daily with breakfast 4/6/19  Yes Jennifer Acuña MD   metoprolol succinate (TOPROL-XL) 100 mg 24 hr tablet Take 100 mg by mouth daily   Yes Historical Provider, MD   omeprazole (PriLOSEC OTC) 20 MG tablet Take 20 mg by mouth daily   Yes Historical Provider, MD   sildenafil (REVATIO) 20 mg tablet TAKE 2 3 TABLETS BY MOUTH AS NEEDED FOR SEXUAL ACTIVITY 7/12/19  Yes Historical Provider, MD   tamsulosin (FLOMAX) 0 4 mg Take 0 4 mg by mouth daily with breakfast   Yes Historical Provider, MD         VTE Pharmacologic Prophylaxis: as per primary  VTE Mechanical Prophylaxis: sequential compression device    ==  Jan Myers 27  Neurology Residency, PGY-2

## 2023-05-02 NOTE — RESTORATIVE TECHNICIAN NOTE
Restorative Technician Note      Patient Name: [facility]     Note Type: Mobility (Pt off the unit )    Lio REYNA, Restorative Technician, [employer]

## 2023-05-02 NOTE — CASE MANAGEMENT
Received voicemail from Martell from [employer] requesting a call back to clarify if pending auth # [id number] is for [facility] or [facility]  P# [phone]  Approval for ARC was given 5/1 Mony Cassette #[id number]) , checked with CM ARC is still the plan  Confirmed  Called back to "[employer] to notify that we already received approval  Spoke to Erick at [place] who stated Destiney Miles is pending on their end and they cannot see the approved auth or anything with that auth #, they only have pending ref# [id number]  Called to [address] [place] [address] P# [phone] ext [phone] who called in approval 5/1, confirmed ARC Destiney Miles is APPROVED and accurate  Per Azeb Long might've mistakenly been pushed to [facility] and we can disregard and use approved auth details provided       Cm notified: Belle Reyes

## 2023-05-02 NOTE — PROGRESS NOTES
NEUROLOGY RESIDENCY PROGRESS NOTE     Name: Wilmer Aleman   Age & Sex: 58 y o  male   MRN: [id number]  [facility]   Encounter: [id number]    Wilmer Aleman will need follow up in in 6 weeks with neurovascular attending  He will not require outpatient neurological testing, at this time  ASSESSMENT & PLAN     * Stroke [facility])  Assessment & Plan  58year old male with HTN, HLD, DM2, prior R MCA CVA s/p thrombectomy in March 2019 w/ no etiology found despite years of loop recorder (and was maintained on Plavix 75mg after that), known severe R M1 stenosis, prior tobacco use, recent L MCA stroke (had aphasia and was empirically AC eliquis 5mg BID w/ ASA 81mg at that time due to ESUSx2) 04/16/2023 (was on came in as a Stroke alert on 4/26/2023  3:30 PM with initial NIHSS of 2 (for LOC questions) and LKW 2:30pm, initial Blood Pressure: 154/71  Initial presenting deficits were L facial droop, R sided weakness, and AMS but on actual exam confused/altered unable to give correct month and correct age    As a result of past stroke within the past month and on eliquis pt was determined to not be a candidate for thrombolysis (TNK)  Has had previous Loop recorder that was negative for any arrhythmias  While here patient had noted new multifocal strokes  Switched from ASA 81mg to Brilinta 90mg BID  prior work-up including IVY (no PFO) and loop recorder without evidence of Afib  CT of chest abdomen and pelvis with no evidence of malignancy 04/17/2023  Thrombosis panel done in 04/18/2023 that was only abnl for AT3 that was low in the acute setting  Previous TTE 04/18/23 showed ED of 55% and nl wall motion and mildly dilated L atrium       • Exam on 05/01/23: appears aphasic unable to give correct month and correct age and expresses frustration, more frontal aphasia, paraphasic errors, R homonymous hemianopia, R leaning gait and small steppage   • Current Blood Pressure: 137/71, BP over 24 hours: BP Min: 110/89  Max: 137/71   • Vascular risk factors: past L MCA and R MCA stroke, DM, HTN, HLD  • Home meds: eliquis 5mg BID, ASA 81mg     Workup:  Lab Results   Component Value Date    HGBA1C 5 8 (H) 04/27/2023    CHOLESTEROL 109 04/27/2023    LDLCALC 40 04/27/2023    TRIG 166 (H) 04/27/2023    INR 1 15 04/26/2023      • CTH: No acute intracranial abnormality  Chronic microangiopathy  Small evolving subacute infarct in the left parietal periventricular white matter  Other small recent infarcts better visualized on previous MRI  • CTA: Stable moderate (60 to 65%) stenosis in the bilateral internal carotid arteries  Stable severe stenosis distal right M1 segment  Stable severe stenosis proximal left M2    • MRI: Compared to the prior recent MRI examination dated 4/17/2023, interval increase in size of diffusion abnormality in the left parietal subcortical and periventricular white matter consistent with acute to subacute ischemia  New small focus of gyral diffusion abnormality in the right frontal parietal region (4:20)  Persistent additional small foci of diffusion abnormality in the left periatrial and parieto-occipital region  No acute hemorrhage or midline shift  Stable moderate chronic microangiopathic changes within the brain  • Echocardiogram: EF of 55% and nl wall motion and mildly dilated L atrium  • IVY in 2019 nl and no soruce of cardiac embolism; LEFT ATRIUM: Size was normal  No thrombus was identified  APPENDAGE: The appendage was small  No thrombus was identified  DOPPLER: The function was mildly reduced (mildly reduced emptying velocity)     • Telemetry: negative    Pertinent scores:  - NIHSS: 2  Stroke Modified [facility] Score: 0 (No baseline symptoms/disability)    Impression: New multiple strokes difficult to state whether cardioembolic give dilated L atrium as well as noted  Severe atherosclerosis in L and R MCA so cardioembolic vs atherembolic vs mixed picture vs central cause, further work-up needs to be don at this time    Plan:  - Discussed plan with neurology attending, Dr Diona Schlatter  - Given noted acute change in patient's gait on the right side that is noticeable by multiple staff that have seen the patient over the past few days, we will get a repeat MRI brain with and without contrast  - will order D-dimer, serum flow cytometry, carotid duplex ultrasound, ESR, CRP  - will order IVY (expected tomorrow, will place n p o  at midnight) to further further evaluate patient's heart given last TTE was in 2019  - Antiplatelet agents: switched BRILINTA 90mg BID   - given that patient was on plavix when he had his stroke on 04/16/2023  - Continue home AC eliquis 5mg BID  - Given noted past history of prior cognitive impairment and previous MOCA of 18/30 in past neurology outpatient visits, would recommend outpatient neurocognitive evaluation  - BP: allow for normotension  - atorvastatin 40mg qhs  - Maintain glucose <180, SSI for coverage if indicated  - Medical management as per primary team appreciated  - continue w/ correction of any toxic/metabolic abnormalities  - DVT ppx and SCDs  - Monitor on telemetry  - PT/OT/Speech/PM&R input appreciated   - patient would likely benefit from speech therapy outpatient  - Stroke education  - rest of care as per primary team                SUBJECTIVE     Overnight: was unable to tolerate MRI due to back pain and did not complete his MRI  Staff did not reach out to team in regards to this  Will re-attempt MRI w/o oxycodone given 30 minutes prior       ***      He denies any headaches, syncope, paresthesias, diplopia, visual changes, tinnitus, sudden onset weakness, garbled speech, dysarthria/slurring of words,  Loss of bowel or bladder      Review of Systems   Unable to perform ROS: Acuity of condition       OBJECTIVE     Patient ID: Aron Rush is a 58 y o  male      Vitals:    05/01/23 1625 05/01/23 2100 05/01/23 2138 05/02/23 0702   BP: 114/67  119/53 116/78   BP Location:   Right arm    Pulse: 68  75 91   Resp:   18 16   Temp:   98 4 °F (36 9 °C) 98 2 °F (36 8 °C)   TempSrc:   Oral    SpO2: 95% 97% 96% 95%   Weight:          Temperature:   Temp (24hrs), Av 3 °F (36 8 °C), Min:98 2 °F (36 8 °C), Max:98 4 °F (36 9 °C)    Temperature: 98 2 °F (36 8 °C)      Physical Exam     Neurologic Exam     Gait, Coordination, and Reflexes     Gait  Gait: wide-based         Physical Exam  Vitals and nursing note reviewed  Constitutional:       General: He is not in acute distress  Appearance: He is well-developed  HENT:      Head: Normocephalic and atraumatic  Eyes:      Extraocular Movements: EOM normal       Conjunctiva/sclera: Conjunctivae normal       Pupils: Pupils are equal, round, and reactive to light  Cardiovascular:      Rate and Rhythm: Normal rate  Pulmonary:      Effort: Pulmonary effort is normal    Abdominal:      Palpations: Abdomen is soft  Tenderness: There is no abdominal tenderness  Musculoskeletal:         General: No swelling  Cervical back: Neck supple  Skin:     General: Skin is warm and dry  Capillary Refill: Capillary refill takes less than 2 seconds  Neurological:      Mental Status: He is alert and oriented to person, place, and time  Motor: Motor strength is normal       Coordination: Finger-Nose-Finger Test and Heel to [facility] Test normal       Deep Tendon Reflexes:      Reflex Scores:       Tricep reflexes are 2+ on the right side and 2+ on the left side  Bicep reflexes are 2+ on the right side and 2+ on the left side  Brachioradialis reflexes are 2+ on the right side and 2+ on the left side  Patellar reflexes are 2+ on the right side and 2+ on the left side  Achilles reflexes are 2+ on the right side and 2+ on the left side    Psychiatric:         Mood and Affect: Mood normal          Speech: Speech normal          Neurologic Exam      Mental Status   Oriented to person, place,    Disoriented to time and season  Speech: speech is normal   Knowledge: good  Able to name object  Able to repeat  Some paraphasic errors    Was able to name clock but got time incorrect   Had difficulty w/ 2-step commands   Could do simple math (2+2) but had trouble naming how many quarters in $1 75  No apraxia appreciated  Patient was able to repeat without any issues    Cranial Nerves      CN II    noted right homonymous hemianopia that seems to be consistent with patient's known stroke     CN III, IV, VI   Pupils are equal, round, and reactive to light  Extraocular motions are normal       CN V   Facial sensation intact       CN VII   Facial expression full, symmetric       CN VIII   CN VIII normal       CN IX, X   CN IX normal    CN X normal       CN XI   CN XI normal       CN XII   CN XII normal       Motor Exam   Overall muscle tone: normal     Strength   Strength 5/5 throughout  Noted R arm orbiting      Sensory Exam   Light touch normal    Pinprick normal       Gait, Coordination, and Reflexes      Coordination   Finger to nose coordination: Patient was able to do finger-nose but had very to much difficulty following the problems particular with his left side  Heel to shin coordination: normal     Reflexes   Right brachioradialis: 2+  Left brachioradialis: 2+  Right biceps: 2+  Left biceps: 2+  Right triceps: 2+  Left triceps: 2+  Right patellar: 2+  Left patellar: 2+  Right achilles: 2+  Left achilles: 2+  Right plantar: normal  Left plantar: normal  Right ankle clonus: absent  Left ankle clonus: absent     On gait exam noted may be can noted wide-based gait patient was leaning on the right and is taking smaller steps with his right leg when using the walker      LABORATORY DATA     Labs: I have personally reviewed pertinent reports     and I have personally reviewed pertinent films in PACS  Results from last 7 days   Lab Units 05/02/23  0524 05/01/23  0617 04/28/23  0522   WBC Thousand/uL 11 98* 11 96* 11 59* HEMOGLOBIN g/dL 14 9 15 6 15 9   HEMATOCRIT % 43 3 46 1 46 2   PLATELETS Thousands/uL 290 292 294      Results from last 7 days   Lab Units 05/02/23  0524 05/01/23  0617 04/29/23  0510   SODIUM mmol/L 134* 132* 133*   POTASSIUM mmol/L 3 3* 4 7 3 4*   CHLORIDE mmol/L 102 101 101   CO2 mmol/L 26 27 28   BUN mg/dL 41* 36* 29*   CREATININE mg/dL 1 20 1 19 1 10   CALCIUM mg/dL 9 8 9 8 9 5     Results from last 7 days   Lab Units 04/27/23  0517 04/26/23  1540   MAGNESIUM mg/dL 2 4 2 4     Results from last 7 days   Lab Units 04/27/23  0517 04/26/23  1540   PHOSPHORUS mg/dL 3 6 3 5      Results from last 7 days   Lab Units 04/26/23  1540   INR  1 15   PTT seconds 34               IMAGING & DIAGNOSTIC TESTING     Radiology Results: I have personally reviewed pertinent reports  and I have personally reviewed pertinent films in PACS    XR chest pa & lateral   Final Result by Nivia Grover MD (04/28 1019)      No acute cardiopulmonary disease  Workstation performed: [id number]         MRI brain wo contrast   Final Result by Jose Skelton MD (04/27 0439)      Compared to the prior recent MRI examination dated 4/17/2023, interval increase in size of diffusion abnormality in the left parietal subcortical and periventricular white matter consistent with acute to subacute ischemia  New small focus of gyral diffusion abnormality in the right frontal parietal region (4:20)  Persistent additional small foci of diffusion abnormality in the left periatrial and parieto-occipital region  No acute hemorrhage or midline shift  Stable moderate chronic microangiopathic changes within the brain  Study was marked in [facility] for immediate notification  Workstation performed: [id number]         CTA stroke alert (head/neck)   Final Result by SUSAN Thomas MD (04/26 1611)   Stable moderate (60 to 65%) stenosis in the bilateral internal carotid arteries     Stable severe stenosis distal right M1 segment  Stable severe stenosis proximal left M2  No new intracranial stenosis or large vessel occlusion  Findings were directly discussed with Frantz Mcguire at 3:42 p m  Workstation performed: [id number]         CT stroke alert brain   Final Result by E Marybelle Leventhal, MD (04/26 1556)      No acute intracranial abnormality  Chronic microangiopathy  Small evolving subacute infarct in the left parietal periventricular white matter  Other small recent infarcts better visualized on previous MRI  Findings were directly discussed with Frantz Mcguire at 3:42 p m  Workstation performed: [id number]         VAS carotid complete study    (Results Pending)   MRI follow up neuro    (Results Pending)       Other Diagnostic Testing: I have personally reviewed pertinent reports     and I have personally reviewed pertinent films in PACS    ACTIVE MEDICATIONS     Current Facility-Administered Medications   Medication Dose Route Frequency   • acetaminophen (TYLENOL) tablet 975 mg  975 mg Oral Q6H PRN   • amLODIPine (NORVASC) tablet 10 mg  10 mg Oral Daily   • apixaban (ELIQUIS) tablet 5 mg  5 mg Oral BID   • atorvastatin (LIPITOR) tablet 40 mg  40 mg Oral Daily   • calcium carbonate (TUMS) chewable tablet 1,000 mg  1,000 mg Oral Daily PRN   • citalopram (CeleXA) tablet 20 mg  20 mg Oral Daily   • docusate sodium (COLACE) capsule 100 mg  100 mg Oral BID PRN   • donepezil (ARICEPT) tablet 5 mg  5 mg Oral BID   • hydrALAZINE (APRESOLINE) tablet 25 mg  25 mg Oral TID   • hydrochlorothiazide (HYDRODIURIL) tablet 25 mg  25 mg Oral Daily   • insulin lispro (HumaLOG) 100 units/mL subcutaneous injection 1-6 Units  1-6 Units Subcutaneous TID AC   • insulin lispro (HumaLOG) 100 units/mL subcutaneous injection 1-6 Units  1-6 Units Subcutaneous HS   • lidocaine (LIDODERM) 5 % patch 1 patch  1 patch Topical Daily   • losartan (COZAAR) tablet 50 mg  50 mg Oral Daily   • metoprolol succinate (TOPROL-XL) 24 hr tablet 100 mg  100 mg Oral Daily   • nicotine (NICODERM CQ) 14 mg/24hr TD 24 hr patch 14 mg  14 mg Transdermal Daily   • ondansetron (ZOFRAN) injection 4 mg  4 mg Intravenous Q6H PRN   • oxyCODONE (ROXICODONE) IR tablet 5 mg  5 mg Oral TID PRN   • pantoprazole (PROTONIX) EC tablet 20 mg  20 mg Oral Early Morning   • senna (SENOKOT) tablet 17 2 mg  2 tablet Oral Daily PRN   • tamsulosin (FLOMAX) capsule 0 4 mg  0 4 mg Oral Daily With Dinner   • ticagrelor (BRILINTA) tablet 90 mg  90 mg Oral Q12H [address]       Prior to Admission medications    Medication Sig Start Date End Date Taking?  Authorizing Provider   amLODIPine (NORVASC) 10 mg tablet Take 10 mg by mouth daily   Yes Historical Provider, MD   apixaban (Eliquis) 5 mg Take 1 tablet (5 mg total) by mouth 2 (two) times a day 4/18/23  Yes Aminah WHYTE PA-C   aspirin (ECOTRIN LOW STRENGTH) 81 mg EC tablet Take 1 tablet (81 mg total) by mouth daily Do not start before April 19, 2023 4/19/23  Yes Aminah WHYTE PA-C   atorvastatin (LIPITOR) 40 mg tablet Take 40 mg by mouth daily with breakfast   Yes Historical Provider, MD   citalopram (CeleXA) 20 mg tablet Take 20 mg by mouth daily   Yes Historical Provider, MD   donepezil (ARICEPT) 5 mg tablet TAKE 1 TABLET TWICE A DAY 2/7/23  Yes Buddy Ramon MD   hydrALAZINE (APRESOLINE) 25 mg tablet Take 25 mg by mouth 3 (three) times a day   Yes Historical Provider, MD   hydrochlorothiazide (HYDRODIURIL) 25 mg tablet Take 25 mg by mouth daily   Yes Historical Provider, MD   lidocaine (Lidoderm) 5 % Apply 1 patch topically over 12 hours daily Remove & Discard patch within 12 hours or as directed by MD 4/26/23  Yes Lenore Severin, DO   losartan (COZAAR) 50 mg tablet Take 50 mg by mouth daily   Yes Historical Provider, MD   metFORMIN (GLUCOPHAGE) 500 mg tablet Take 1 tablet (500 mg total) by mouth daily with breakfast 4/6/19  Yes Henrene Favre, MD   metoprolol succinate (TOPROL-XL) 100 mg 24 hr tablet Take 100 mg by mouth daily   Yes Historical Provider, MD   omeprazole (PriLOSEC OTC) 20 MG tablet Take 20 mg by mouth daily   Yes Historical Provider, MD   sildenafil (REVATIO) 20 mg tablet TAKE 2 3 TABLETS BY MOUTH AS NEEDED FOR SEXUAL ACTIVITY 7/12/19  Yes Historical Provider, MD   tamsulosin (FLOMAX) 0 4 mg Take 0 4 mg by mouth daily with breakfast   Yes Historical Provider, MD         VTE Pharmacologic Prophylaxis: as per primary  VTE Mechanical Prophylaxis: sequential compression device    ==  Jan Myers 27  Neurology Residency, PGY-2

## 2023-05-02 NOTE — ASSESSMENT & PLAN NOTE
· Noted on admission with WBC 12K  · Infectious workup unremarkable   · Procal levels negative   · Remains stable off of antibiotic   · ESR 29 and CRP 7 9  · Plan for IVY today   · Check MRI L-spine  · Trend WBC

## 2023-05-02 NOTE — PLAN OF CARE
Problem: PAIN - ADULT  Goal: Verbalizes/displays adequate comfort level or baseline comfort level  Description: Interventions:  - Encourage patient to monitor pain and request assistance  - Assess pain using appropriate pain scale  - Administer analgesics based on type and severity of pain and evaluate response  - Implement non-pharmacological measures as appropriate and evaluate response  - Consider cultural and social influences on pain and pain management  - Notify physician/advanced practitioner if interventions unsuccessful or patient reports new pain  Outcome: Progressing     Problem: SAFETY ADULT  Goal: Patient will remain free of falls  Description: INTERVENTIONS:  - Educate patient/family on patient safety including physical limitations  - Instruct patient to call for assistance with activity   - Consult OT/PT to assist with strengthening/mobility   - Keep Call bell within reach  - Keep bed low and locked with side rails adjusted as appropriate  - Keep care items and personal belongings within reach  - Initiate and maintain comfort rounds  - Make Fall Risk Sign visible to staff  - Offer Toileting every 3  Problem: NEUROSENSORY - ADULT  Goal: Achieves stable or improved neurological status  Description: INTERVENTIONS  - Monitor and report changes in neurological status  - Monitor vital signs such as temperature, blood pressure, glucose, and any other labs ordered   - Initiate measures to prevent increased intracranial pressure  - Monitor for seizure activity and implement precautions if appropriate      Outcome: Progressing  Goal: Achieves maximal functionality and self care  Description: INTERVENTIONS  - Monitor swallowing and airway patency with patient fatigue and changes in neurological status  - Encourage and assist patient to increase activity and self care     - Encourage visually impaired, hearing impaired and aphasic patients to use assistive/communication devices  Outcome: Progressing    Hours, in advance of need  - Initiate/Maintain bed alarm  - Obtain necessary fall risk management equipment  - Apply yellow socks and bracelet for high fall risk patients  - Consider moving patient to room near nurses station  Outcome: Progressing

## 2023-05-02 NOTE — ANESTHESIA POSTPROCEDURE EVALUATION
Post-Op Assessment Note    CV Status:  Stable  Pain Score: 0    Pain management: adequate     Mental Status:  Awake   Hydration Status:  Stable   PONV Controlled:  None   Airway Patency:  Patent and adequate       Staff: CRNA         No notable events documented      /61 (05/02/23 1444)    Temp      Pulse 68 (05/02/23 1444)   Resp 16 (05/02/23 1444)    SpO2 98 % (05/02/23 1444)

## 2023-05-02 NOTE — UTILIZATION REVIEW
Continued Stay Review    Date:  5-2-23                          Current Patient Class:  Inpatient  Current Level of Care: med surg    HPI:63 y o  male initially admitted on 4-26-23     Assessment/Plan:     Patient unable to tolerate MRI due to back pain  Will reschedule with analgesia  IVY today  Neuro exam unchanged  Patient with aphasia, newly ataxia ( truncal and gait )  D dimer mildly elevated to 1 13 related to stroke  Continue telemetry and therapy for functional deficits      Vital Signs:     Date/Time Temp Pulse Resp BP MAP (mmHg) SpO2   05/02/23 12:43:38 97 7 °F (36 5 °C) 65 -- 118/86 97 96 %   05/02/23 07:02:39 98 2 °F (36 8 °C) 91 16 116/78 91 95 %           Pertinent Labs/Diagnostic Results:   Results from last 7 days   Lab Units 04/27/23  1102   SARS-COV-2  Negative     Results from last 7 days   Lab Units 05/02/23  0524 05/01/23  0617 04/28/23  0522 04/27/23  0517 04/26/23  1540   WBC Thousand/uL 11 98* 11 96* 11 59* 11 01* 12 38*   HEMOGLOBIN g/dL 14 9 15 6 15 9 15 7 15 9   HEMATOCRIT % 43 3 46 1 46 2 45 3 45 2   PLATELETS Thousands/uL 290 292 294 308 316         Results from last 7 days   Lab Units 05/02/23  0524 05/01/23  0617 04/29/23  0510 04/28/23  1112 04/27/23  0517 04/26/23  1540   SODIUM mmol/L 134* 132* 133* 133* 135 130*   POTASSIUM mmol/L 3 3* 4 7 3 4* 3 9 3 3* 3 7   CHLORIDE mmol/L 102 101 101 101 105 100   CO2 mmol/L 26 27 28 28 26 25   ANION GAP mmol/L 6 4 4 4 4 5   BUN mg/dL 41* 36* 29* 24 29* 25   CREATININE mg/dL 1 20 1 19 1 10 1 08 0 99 1 15   EGFR ml/min/1 73sq m 63 64 70 72 80 66   CALCIUM mg/dL 9 8 9 8 9 5 9 7 9 1 8 9   MAGNESIUM mg/dL  --   --   --   --  2 4 2 4   PHOSPHORUS mg/dL  --   --   --   --  3 6 3 5         Results from last 7 days   Lab Units 05/02/23  1054 05/02/23  4745 05/01/23  2048 05/01/23  1559 05/01/23  1029 05/01/23  0602 04/30/23  2102 04/30/23  1624 04/30/23  1055 04/30/23  0624 04/29/23  2106 04/29/23  1556   POC GLUCOSE mg/dl 115 94 125 104 116 108 128 130 107 109 88 108     Results from last 7 days   Lab Units 05/02/23  0524 05/01/23  0617 04/29/23  0510 04/28/23  1112 04/27/23  0517 04/26/23  1540   GLUCOSE RANDOM mg/dL 98 112 106 105 109 123         Results from last 7 days   Lab Units 04/27/23  0517   HEMOGLOBIN A1C % 5 8*   EAG mg/dl 120       Results from last 7 days   Lab Units 04/26/23 2004 04/26/23  1812 04/26/23  1540   HS TNI 0HR ng/L  --   --  13   HS TNI 2HR ng/L  --  14  --    HSTNI D2 ng/L  --  1  --    HS TNI 4HR ng/L 15  --   --    HSTNI D4 ng/L 2  --   --      Results from last 7 days   Lab Units 05/01/23  1612   D-DIMER QUANTITATIVE ug/ml FEU 1 13*     Results from last 7 days   Lab Units 04/26/23  1540   PROTIME seconds 14 9*   INR  1 15   PTT seconds 34         Results from last 7 days   Lab Units 04/28/23  0522 04/27/23  0517   PROCALCITONIN ng/ml <0 05 <0 05     Results from last 7 days   Lab Units 05/01/23  1612 05/01/23  0617   CRP mg/L  --  7 9*   SED RATE mm/hour 29*  --        Results from last 7 days   Lab Units 04/27/23  1102   INFLUENZA A PCR  Negative   INFLUENZA B PCR  Negative   RSV PCR  Negative       Scheduled Medications:  acetaminophen, 975 mg, Oral, Q8H JOSE  amLODIPine, 10 mg, Oral, Daily  apixaban, 5 mg, Oral, BID  atorvastatin, 40 mg, Oral, Daily  citalopram, 20 mg, Oral, Daily  docusate sodium, 100 mg, Oral, BID  donepezil, 5 mg, Oral, BID  hydrALAZINE, 25 mg, Oral, TID  hydrochlorothiazide, 25 mg, Oral, Daily  insulin lispro, 1-6 Units, Subcutaneous, TID AC  insulin lispro, 1-6 Units, Subcutaneous, HS  lidocaine, 1 patch, Topical, Daily  losartan, 50 mg, Oral, Daily  methocarbamol, 500 mg, Oral, Q6H JOSE  metoprolol succinate, 100 mg, Oral, Daily  nicotine, 14 mg, Transdermal, Daily  pantoprazole, 20 mg, Oral, Early Morning  potassium chloride, 20 mEq, Oral, Once  tamsulosin, 0 4 mg, Oral, Daily With Dinner  ticagrelor, 90 mg, Oral, Q12H [address]      Continuous IV Infusions:     PRN Meds:  calcium carbonate, 1,000 mg, Oral, Daily PRN  ondansetron, 4 mg, Intravenous, Q6H PRN  oxyCODONE, 10 mg, Oral, Q4H PRN  oxyCODONE, 5 mg, Oral, Q4H PRN  senna, 2 tablet, Oral, Daily PRN        Discharge Plan: to be determined    Network Utilization Review Department  ATTENTION: Please call with any questions or concerns to [phone] and carefully listen to the prompts so that you are directed to the right person  All voicemails are confidential   Ernesto Fines all requests for admission clinical reviews, approved or denied determinations and any other requests to dedicated fax number below belonging to the campus where the patient is receiving treatment   List of dedicated fax numbers for the Facilities:  [address] DENIALS (Administrative/Medical Necessity) [phone]   [address] (Maternity/NICU/Pediatrics) [phone]   [address] Mary [address] [phone]   [facility] [place] [address] [phone]   [address] [address] Zeke [address] [phone]   [address] [place] [facility] 134 815 [facility] [phone]

## 2023-05-02 NOTE — PROGRESS NOTES
[address] [facility]  Progress Note  Name: Julissa Simons  MRN: [id number]  Unit/Bed#: [facility]PHP 703-01 I Date of Admission: 4/26/2023   Date of Service: 5/2/2023 I Hospital Day: 6    Assessment/Plan   * Stroke [facility])  Assessment & Plan  Presented to the ER as a stroke alert with history of known severe R M1 stenosis, prior tobacco use, recent L MCA stroke (residual aphasia) 04/16/2023 came in as a Stroke alert on 4/26/2023  NIHSS of 2  Initial presenting deficits were L facial droop, R sided weakness, and AMS   As a result of past stroke within the past month and on eliquis pt was determined to not be a candidate for thrombolysis   · 4/26/2023 CTA of the head and neck-Stable moderate (60 to 65%) stenosis in the bilateral internal carotid arteries  Stable severe stenosis distal right M1 segment  Stable severe stenosis proximal left M2  No new intracranial stenosis or large vessel occlusion  · MRI brain 4/27- Compared to the prior recent MRI examination dated 4/17/2023, interval increase in size of diffusion abnormality in the left parietal subcortical and periventricular white matter consistent with acute to subacute ischemia  New small focus of gyral diffusion abnormality in the right frontal parietal region  Persistent additional small foci of diffusion abnormality in the left periatrial and parieto-occipital region  No acute hemorrhage or midline shift  Stable moderate chronic microangiopathic changes within the brain  · 4/18/2023 Echo: LVEF 55%  Systolic function is normal  Wall motion is normal   Grade 1 diastolic dysfunction left and right atrium mildly dilated  Mild aortic valve stenosis    Mild mitral and tricuspid valve regurgitation  · No arrythmias on telemetry monitoring, was discontinued, in the setting of worsening weakness will reorder and monitor   · Neurology following, appreciate recommendations   · Started on Brilinta 4/27  · Continue with Eliquis 5 mg twice daily and statin   · 5/1 - PT and RN noted with changes in gait with favoring/leaning to right side, additional workup ordered and pending: MRI brain pending, IVY scheduled for today, carotid US pending, cytometry pending, D-dimer 1 13, ESR 29/CRP 7 9  · Pt was unable to tolerate MRI brain due to back pain- oxy ordered to be given prior   · ddimer noted to be elevated in the setting of chronic anticoagulation  No respiratory distress and currently on room air so will defer CTA of chest at this time  Will check venous duplex   · PT/OT recommending acute rehab at discharge     Leukocytosis  Assessment & Plan  · Noted on admission with WBC 12K  · Infectious workup unremarkable   · Procal levels negative   · Remains stable off of antibiotic   · ESR 29 and CRP 7 9  · Plan for IVY today   · Check MRI L-spine  · Trend WBC    Prediabetes  Assessment & Plan  · A1c 5 8  · Currently holding metformin while hospitalized, resume on discharge   · Carb controlled diet     Hyponatremia  Assessment & Plan  · Mild, likely due to poor oral intake and HCTZ use  · HCTZ now resumed  · Monitor     Chronic low back pain  Assessment & Plan  Reports chronic lower back pain x 4 weeks which has not improved despite multiple trails of steroids, nsaids and muscle relaxants  · XR of lumbar spine ordered by PCP: No lumbar vertebral body fracture or subluxation  Multilevel degenerative changes     · Pt now reporting 12/10 low back pain, right leg weakness and difficulty urinating- will request MRI L-spine   · schedule tylenol, lidocaine patch, add robaxin, prn oxy   · PT/OT- initially recommending acute rehab     Chronic ischemic right MCA stroke  Assessment & Plan  · See plan above     Adjustment reaction with anxiety  Assessment & Plan  · Continue celexa   · Supportive cares    Urinary retention  Assessment & Plan  · Pt reporting difficulty with urinating   · Follow urine retention protocol and PVRs  · C/w flomax    Constipation  Assessment & Plan  · Add bowel regimen     Nicotine dependence  Assessment & Plan  · Encourage cessation   · Added nicoderm patch     Primary hypertension  Assessment & Plan  · Per wife BP has been uncontrolled outpatient, compliance has not been ideal until she started managing his medications  · Continue with metoprolol 100mg daily, losartan 50mg daily, hydralazine 25mg TID and amlodipine 10mg daily, HCTZ 25mg   · BP stable, monitor routinely              VTE Pharmacologic Prophylaxis: VTE Score: 3 Moderate Risk (Score 3-4) - Pharmacological DVT Prophylaxis Ordered: apixaban (Eliquis)  Patient Centered Rounds: I performed bedside rounds with nursing staff today  Discussions with Specialists or Other Care Team Provider: d/w RN and neurology     Education and Discussions with Family / Patient: Updated  (wife) via phone  Total Time Spent on Date of Encounter in care of patient: 35 minutes This time was spent on one or more of the following: performing physical exam; counseling and coordination of care; obtaining or reviewing history; documenting in the medical record; reviewing/ordering tests, medications or procedures; communicating with other healthcare professionals and discussing with patient's family/caregivers  Current Length of Stay: 6 day(s)  Current Patient Status: Inpatient   Certification Statement: The patient will continue to require additional inpatient hospital stay due to additional CVA workup   Discharge Plan: Anticipate discharge in 24-48 hrs to rehab facility  Code Status: Level 1 - Full Code    Subjective:   Pt reports he is feeling frustrated, difficulty with processing, leaning towards the right side and generalized weakness with right worse than left lower extremity  Pt reporting this started yesterday and he feels he is only getting worse  Pt is reporting difficulty with urinating but was eventually able to urinate  Last BM is 4/29  Reports his low back pain today is a 12/10   Was unable to tolerate MRI of brain due to back pain and having to lay flat  Objective:     Vitals:   Temp (24hrs), Av 3 °F (36 8 °C), Min:98 2 °F (36 8 °C), Max:98 4 °F (36 9 °C)    Temp:  [98 2 °F (36 8 °C)-98 4 °F (36 9 °C)] 98 2 °F (36 8 °C)  HR:  [68-91] 91  Resp:  [16-18] 16  BP: (105-137)/(53-85) 116/78  SpO2:  [93 %-97 %] 95 %  Body mass index is 34 53 kg/m²  Input and Output Summary (last 24 hours): Intake/Output Summary (Last 24 hours) at 2023 0846  Last data filed at 2023 0501  Gross per 24 hour   Intake 125 ml   Output --   Net 125 ml       Physical Exam:   Physical Exam  Vitals and nursing note reviewed  Constitutional:       General: He is not in acute distress  Appearance: He is obese  HENT:      Head: Normocephalic and atraumatic  Mouth/Throat:      Mouth: Mucous membranes are moist    Eyes:      General: No scleral icterus  Conjunctiva/sclera: Conjunctivae normal       Pupils: Pupils are equal, round, and reactive to light  Cardiovascular:      Rate and Rhythm: Normal rate and regular rhythm  Heart sounds: Murmur heard  Pulmonary:      Effort: Pulmonary effort is normal  No respiratory distress  Breath sounds: Normal breath sounds  No wheezing or rales  Abdominal:      General: Bowel sounds are normal  There is no distension  Palpations: Abdomen is soft  Tenderness: There is no abdominal tenderness  Musculoskeletal:         General: No swelling or tenderness  Skin:     General: Skin is warm and dry  Neurological:      Mental Status: He is alert  Cranial Nerves: Dysarthria present  No facial asymmetry  Motor: Weakness (right lower extremity ) present  Psychiatric:         Behavior: Behavior is slowed  Cognition and Memory: Cognition is impaired        Comments: frustrated          Additional Data:     Labs:  Results from last 7 days   Lab Units 23  0524   WBC Thousand/uL 11 98*   HEMOGLOBIN g/dL 14 9   HEMATOCRIT % 43 3   PLATELETS Thousands/uL 290     Results from last 7 days   Lab Units 05/02/23  0524   SODIUM mmol/L 134*   POTASSIUM mmol/L 3 3*   CHLORIDE mmol/L 102   CO2 mmol/L 26   BUN mg/dL 41*   CREATININE mg/dL 1 20   ANION GAP mmol/L 6   CALCIUM mg/dL 9 8   GLUCOSE RANDOM mg/dL 98     Results from last 7 days   Lab Units 04/26/23  1540   INR  1 15     Results from last 7 days   Lab Units 05/02/23  0638 05/01/23  2048 05/01/23  1559 05/01/23  1029 05/01/23  0602 04/30/23  2102 04/30/23  1624 04/30/23  1055 04/30/23  0624 04/29/23  2106 04/29/23  1556 04/29/23  1046   POC GLUCOSE mg/dl 94 125 104 116 108 128 130 107 109 88 108 106     Results from last 7 days   Lab Units 04/27/23  0517   HEMOGLOBIN A1C % 5 8*     Results from last 7 days   Lab Units 04/28/23  0522 04/27/23  0517   PROCALCITONIN ng/ml <0 05 <0 05       Lines/Drains:  Invasive Devices     Peripheral Intravenous Line  Duration           Peripheral IV 05/01/23 Dorsal (posterior); Right Hand 1 day                  Telemetry:  Telemetry Orders (From admission, onward)             48 Hour Telemetry Monitoring  Continuous x 48 hours        References:    Telemetry Guidelines   Question:  Reason for 48 Hour Telemetry  Answer:  Acute CVA (<24 hrs old, hemispheric strokes, selected brainstem strokes, cardiac arrhythmias)                    Indication for Continued Telemetry Use: Acute CVA             Imaging: Reviewed radiology reports from this admission including: chest xray, CT head and MRI brain    Recent Cultures (last 7 days):         Last 24 Hours Medication List:   Current Facility-Administered Medications   Medication Dose Route Frequency Provider Last Rate   • acetaminophen  975 mg Oral Q8H [address] Lella Never, CRNP     • amLODIPine  10 mg Oral Daily Melissa Saravia MD     • apixaban  5 mg Oral BID Melissa Saravia MD     • atorvastatin  40 mg Oral Daily Melissa Saravia MD     • calcium carbonate  1,000 mg Oral Daily PRN Melissa Saravia MD     • citalopram  20 mg Oral Daily Dawn Schwab, MD     • docusate sodium  100 mg Oral BID PRN ERIC Enamorado     • donepezil  5 mg Oral BID Dawn Schwab, MD     • hydrALAZINE  25 mg Oral TID Dawn Schwab, MD     • hydrochlorothiazide  25 mg Oral Daily ERIC Og     • insulin lispro  1-6 Units Subcutaneous TID AC Dawn Schwab, MD     • insulin lispro  1-6 Units Subcutaneous HS Dawn Schwab, MD     • lidocaine  1 patch Topical Daily ERIC Enamorado     • losartan  50 mg Oral Daily Dawn Schwab, MD     • methocarbamol  500 mg Oral Q6H [facility] ERIC Enamorado     • metoprolol succinate  100 mg Oral Daily Dawn Schwab, MD     • nicotine  14 mg Transdermal Daily ERIC Enamorado     • ondansetron  4 mg Intravenous Q6H PRN Dawn Schwab, MD     • oxyCODONE  10 mg Oral Q4H PRN ERIC Enamorado     • oxyCODONE  5 mg Oral Q4H PRN ERIC Enamorado     • pantoprazole  20 mg Oral Early Morning Dawn Schwab, MD     • potassium chloride  20 mEq Oral Once ERIC Enamorado     • potassium chloride  20 mEq Intravenous Once ERIC Enamorado     • senna  2 tablet Oral Daily PRN Dawn Schwab, MD     • tamsulosin  0 4 mg Oral Daily With Ting Maldonado MD     • ticagrelor  90 mg Oral Q12H [address] [facility],           Today, Patient Was Seen By: ERIC Enamorado    **Please Note: This note may have been constructed using a voice recognition system  **

## 2023-05-02 NOTE — QUICK NOTE
Called to bedside by RN as patient slipped and experienced a slow fall to the floor when attempting to reach for his walker and move from the stretcher to the bed  He denies any pain or radiation of pain  Per RN he slid to his buttocks and did not hit his head  No spinal tenderness or ecchymosis noted   No further imaging necessary at this time

## 2023-05-02 NOTE — ASSESSMENT & PLAN NOTE
Presented to the ER as a stroke alert with history of known severe R M1 stenosis, prior tobacco use, recent L MCA stroke (residual aphasia) 04/16/2023 came in as a Stroke alert on 4/26/2023  NIHSS of 2  Initial presenting deficits were L facial droop, R sided weakness, and AMS   As a result of past stroke within the past month and on eliquis pt was determined to not be a candidate for thrombolysis   · 4/26/2023 CTA of the head and neck-Stable moderate (60 to 65%) stenosis in the bilateral internal carotid arteries  Stable severe stenosis distal right M1 segment  Stable severe stenosis proximal left M2  No new intracranial stenosis or large vessel occlusion  · MRI brain 4/27- Compared to the prior recent MRI examination dated 4/17/2023, interval increase in size of diffusion abnormality in the left parietal subcortical and periventricular white matter consistent with acute to subacute ischemia  New small focus of gyral diffusion abnormality in the right frontal parietal region  Persistent additional small foci of diffusion abnormality in the left periatrial and parieto-occipital region  No acute hemorrhage or midline shift  Stable moderate chronic microangiopathic changes within the brain  · 4/18/2023 Echo: LVEF 55%  Systolic function is normal  Wall motion is normal   Grade 1 diastolic dysfunction left and right atrium mildly dilated  Mild aortic valve stenosis    Mild mitral and tricuspid valve regurgitation  · No arrythmias on telemetry monitoring, was discontinued, in the setting of worsening weakness will reorder and monitor   · Neurology following, appreciate recommendations   · Started on Brilinta 4/27  · Continue with Eliquis 5 mg twice daily and statin   · 5/1 - PT and RN noted with changes in gait with favoring/leaning to right side, additional workup ordered and pending: MRI brain pending, IVY scheduled for today, carotid US pending, cytometry pending, D-dimer 1 13, ESR 29/CRP 7 9  · Pt was unable to tolerate MRI brain due to back pain- oxy ordered to be given prior   · ddimer noted to be elevated in the setting of chronic anticoagulation  No respiratory distress and currently on room air so will defer CTA of chest at this time   Will check venous duplex   · PT/OT recommending acute rehab at discharge

## 2023-05-02 NOTE — PLAN OF CARE
Problem: PAIN - ADULT  Goal: Verbalizes/displays adequate comfort level or baseline comfort level  Description: Interventions:  - Encourage patient to monitor pain and request assistance  - Assess pain using appropriate pain scale  - Administer analgesics based on type and severity of pain and evaluate response  - Implement non-pharmacological measures as appropriate and evaluate response  - Consider cultural and social influences on pain and pain management  - Notify physician/advanced practitioner if interventions unsuccessful or patient reports new pain  Outcome: Progressing     Problem: INFECTION - ADULT  Goal: Absence or prevention of progression during hospitalization  Description: INTERVENTIONS:  - Assess and monitor for signs and symptoms of infection  - Monitor lab/diagnostic results  - Monitor all insertion sites, i e  indwelling lines, tubes, and drains  - Monitor endotracheal if appropriate and nasal secretions for changes in amount and color  - Williamson appropriate cooling/warming therapies per order  - Administer medications as ordered  - Instruct and encourage patient and family to use good hand hygiene technique  - Identify and instruct in appropriate isolation precautions for identified infection/condition  Outcome: Progressing     Problem: SAFETY ADULT  Goal: Patient will remain free of falls  Description: INTERVENTIONS:  - Educate patient/family on patient safety including physical limitations  - Instruct patient to call for assistance with activity   - Consult OT/PT to assist with strengthening/mobility   - Keep Call bell within reach  - Keep bed low and locked with side rails adjusted as appropriate  - Keep care items and personal belongings within reach  - Initiate and maintain comfort rounds  - Make Fall Risk Sign visible to staff  - Offer Toileting every 2 Hours, in advance of need  - Initiate/Maintain bed/chair alarm  - Obtain necessary fall risk management equipment: assistive devices (walker)  - Apply yellow socks and bracelet for high fall risk patients  - Consider moving patient to room near nurses station  Outcome: Progressing     Problem: DISCHARGE PLANNING  Goal: Discharge to home or other facility with appropriate resources  Description: INTERVENTIONS:  - Identify barriers to discharge w/patient and caregiver  - Arrange for needed discharge resources and transportation as appropriate  - Identify discharge learning needs (meds, wound care, etc )  - Arrange for interpretive services to assist at discharge as needed  - Refer to Case Management Department for coordinating discharge planning if the patient needs post-hospital services based on physician/advanced practitioner order or complex needs related to functional status, cognitive ability, or social support system  Outcome: Progressing     Problem: Knowledge Deficit  Goal: Patient/family/caregiver demonstrates understanding of disease process, treatment plan, medications, and discharge instructions  Description: Complete learning assessment and assess knowledge base  Interventions:  - Provide teaching at level of understanding  - Provide teaching via preferred learning methods  Outcome: Progressing     Problem: NEUROSENSORY - ADULT  Goal: Achieves stable or improved neurological status  Description: INTERVENTIONS  - Monitor and report changes in neurological status  - Monitor vital signs such as temperature, blood pressure, glucose, and any other labs ordered   - Initiate measures to prevent increased intracranial pressure  - Monitor for seizure activity and implement precautions if appropriate      Outcome: Progressing  Goal: Achieves maximal functionality and self care  Description: INTERVENTIONS  - Monitor swallowing and airway patency with patient fatigue and changes in neurological status  - Encourage and assist patient to increase activity and self care     - Encourage visually impaired, hearing impaired and aphasic patients to use assistive/communication devices  Outcome: Progressing     Problem: CARDIOVASCULAR - ADULT  Goal: Maintains optimal cardiac output and hemodynamic stability  Description: INTERVENTIONS:  - Monitor I/O, vital signs and rhythm  - Monitor for S/S and trends of decreased cardiac output  - Administer and titrate ordered vasoactive medications to optimize hemodynamic stability  - Assess quality of pulses, skin color and temperature  - Assess for signs of decreased coronary artery perfusion  - Instruct patient to report change in severity of symptoms  Outcome: Progressing  Goal: Absence of cardiac dysrhythmias or at baseline rhythm  Description: INTERVENTIONS:  - Continuous cardiac monitoring, vital signs, obtain 12 lead EKG if ordered  - Administer antiarrhythmic and heart rate control medications as ordered  - Monitor electrolytes and administer replacement therapy as ordered  Outcome: Progressing     Problem: METABOLIC, FLUID AND ELECTROLYTES - ADULT  Goal: Electrolytes maintained within normal limits  Description: INTERVENTIONS:  - Monitor labs and assess patient for signs and symptoms of electrolyte imbalances  - Administer electrolyte replacement as ordered  - Monitor response to electrolyte replacements, including repeat lab results as appropriate  - Instruct patient on fluid and nutrition as appropriate  Outcome: Progressing  Goal: Glucose maintained within target range  Description: INTERVENTIONS:  - Monitor Blood Glucose as ordered  - Assess for signs and symptoms of hyperglycemia and hypoglycemia  - Administer ordered medications to maintain glucose within target range  - Assess nutritional intake and initiate nutrition service referral as needed  Outcome: Progressing     Problem: MOBILITY - ADULT  Goal: Maintain or return to baseline ADL function  Description: INTERVENTIONS:  -  Assess patient's ability to carry out ADLs; assess patient's baseline for ADL function and identify physical deficits which impact ability to perform ADLs (bathing, care of mouth/teeth, toileting, grooming, dressing, etc )  - Assess/evaluate cause of self-care deficits   - Assess range of motion  - Assess patient's mobility; develop plan if impaired  - Assess patient's need for assistive devices and provide as appropriate  - Encourage maximum independence but intervene and supervise when necessary  - Involve family in performance of ADLs  - Assess for home care needs following discharge   - Consider OT consult to assist with ADL evaluation and planning for discharge  - Provide patient education as appropriate  Outcome: Progressing  Goal: Maintains/Returns to pre admission functional level  Description: INTERVENTIONS:  - Perform BMAT or MOVE assessment daily    - Set and communicate daily mobility goal to care team and patient/family/caregiver  - Collaborate with rehabilitation services on mobility goals if consulted  - Perform Range of Motion 2 times a day  - Reposition patient every 2 hours    - Dangle patient 2 times a day  - Stand patient 2 times a day  - Ambulate patient 2 times a day  - Out of bed to chair 2 times a day   - Out of bed for meals 2times a day  - Out of bed for toileting  - Record patient progress and toleration of activity level   Outcome: Progressing

## 2023-05-02 NOTE — PROGRESS NOTES
Pastoral Care Progress Note    2023  Patient: Janak Card : 1960  Admission Date & Time: 2023 1530  MRN: [id number] [facility]: [id number]         23 0900   Clinical Encounter Type   Visited With Patient   Routine Visit Follow-up     Fr Ryan Soria followed-up with the pt and conducted a pastoral visit  No further needs were expressed at this time  Chaplains still remain available

## 2023-05-02 NOTE — ANESTHESIA PREPROCEDURE EVALUATION
Procedure:  IVY    TTE (04/2023):  Left Ventricle Left ventricular cavity size is normal  Wall thickness is mildly increased  The left ventricular ejection fraction is 55%  Systolic function is normal   Wall motion is normal  Diastolic function is mildly abnormal, consistent with grade I (abnormal) relaxation  Right Ventricle Right ventricular cavity size is normal  Systolic function is normal  Wall thickness is normal    Left Atrium The atrium is mildly dilated  Right Atrium The atrium is mildly dilated  Aortic Valve The aortic valve is trileaflet  The leaflets are not thickened  The leaflets are mildly calcified  There is mildly reduced mobility  There is no evidence of regurgitation  There is mild stenosis  Mitral Valve Mitral valve structure is normal  There is mild regurgitation  There is no evidence of stenosis  Tricuspid Valve Tricuspid valve structure is normal  There is mild regurgitation  There is no evidence of stenosis  Pulmonic Valve Pulmonic valve structure is normal  There is no evidence of regurgitation  There is no evidence of stenosis  Ascending Aorta The aortic root is normal in size  IVC/SVC The inferior vena cava is normal in size  Pericardium There is no pericardial effusion  The pericardium is normal in appearance  EKG (04/2023);   Sinus rhythm with occasional Premature ventricular complexes  T wave abnormality, consider inferior ischemia  Abnormal ECG  When compared with ECG of 16-APR-2023 20:41,  Premature ventricular complexes are now Present  T wave inversion now evident in Inferior leads  Nonspecific T wave abnormality now evident in Anterolateral leads    Relevant Problems   CARDIO   (+) Hypertriglyceridemia   (+) Primary hypertension      GI/HEPATIC   (+) GERD (gastroesophageal reflux disease)      MUSCULOSKELETAL   (+) Chronic low back pain      NEURO/PSYCH   (+) Chronic low back pain   (+) Headache   (+) 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 ([facility] [address] ) (+) History of stroke   (+) Stroke [facility])        Physical Exam    Airway    Mallampati score: II  TM Distance: >3 FB  Neck ROM: full     Dental   No notable dental hx     Cardiovascular  Rhythm: regular, Rate: normal,     Pulmonary  Breath sounds clear to auscultation,     Other Findings  Intercisor Distance > 3cm          Anesthesia Plan  ASA Score- 3     Anesthesia Type- IV sedation with anesthesia with ASA Monitors  Additional Monitors:   Airway Plan:     Comment: NPO appropriate  Discussed benefits/risks of monitored anesthetic care which involves providing a dynamic level of mild to deep sedation  Complications include awareness and/or airway obstruction/aspiration which may necessitate conversion to general anesthesia  All questions answered  Patient understands and wishes to proceed          Plan Factors-Exercise tolerance (METS): >4 METS  Chart reviewed  EKG reviewed  Existing labs reviewed  Induction-     Postoperative Plan- Plan for postoperative opioid use  Informed Consent- Anesthetic plan and risks discussed with patient  I personally reviewed this patient with the CRNA  Discussed and agreed on the Anesthesia Plan with the CRNA  Nel Hines

## 2023-05-03 ENCOUNTER — APPOINTMENT (INPATIENT)
Dept: RADIOLOGY | Facility: HOSPITAL | Age: 63
End: 2023-05-03

## 2023-05-03 PROBLEM — R33.9 URINARY RETENTION: Status: RESOLVED | Noted: 2019-03-27 | Resolved: 2023-05-03

## 2023-05-03 PROBLEM — R65.10 SIRS (SYSTEMIC INFLAMMATORY RESPONSE SYNDROME) (HCC): Status: ACTIVE | Noted: 2023-04-27

## 2023-05-03 PROBLEM — I65.23 BILATERAL CAROTID ARTERY STENOSIS: Status: ACTIVE | Noted: 2019-03-26

## 2023-05-03 LAB
ANA SER QL IA: NEGATIVE
ANION GAP SERPL CALCULATED.3IONS-SCNC: 3 MMOL/L (ref 4–13)
BASOPHILS # BLD AUTO: 0.08 THOUSANDS/ÂΜL (ref 0–0.1)
BASOPHILS NFR BLD AUTO: 1 % (ref 0–1)
BUN SERPL-MCNC: 37 MG/DL (ref 5–25)
CALCIUM SERPL-MCNC: 9.6 MG/DL (ref 8.3–10.1)
CHLORIDE SERPL-SCNC: 104 MMOL/L (ref 96–108)
CO2 SERPL-SCNC: 27 MMOL/L (ref 21–32)
CREAT SERPL-MCNC: 1.2 MG/DL (ref 0.6–1.3)
EOSINOPHIL # BLD AUTO: 0.35 THOUSAND/ÂΜL (ref 0–0.61)
EOSINOPHIL NFR BLD AUTO: 3 % (ref 0–6)
ERYTHROCYTE [DISTWIDTH] IN BLOOD BY AUTOMATED COUNT: 12.9 % (ref 11.6–15.1)
GFR SERPL CREATININE-BSD FRML MDRD: 63 ML/MIN/1.73SQ M
GLUCOSE SERPL-MCNC: 104 MG/DL (ref 65–140)
GLUCOSE SERPL-MCNC: 108 MG/DL (ref 65–140)
GLUCOSE SERPL-MCNC: 126 MG/DL (ref 65–140)
GLUCOSE SERPL-MCNC: 135 MG/DL (ref 65–140)
GLUCOSE SERPL-MCNC: 143 MG/DL (ref 65–140)
HBV CORE AB SER QL: NORMAL
HBV CORE IGM SER QL: NORMAL
HBV SURFACE AG SER QL: NORMAL
HCT VFR BLD AUTO: 41.8 % (ref 36.5–49.3)
HCV AB SER QL: NORMAL
HGB BLD-MCNC: 14.5 G/DL (ref 12–17)
HIV 1+2 AB+HIV1 P24 AG SERPL QL IA: NORMAL
HIV 2 AB SERPL QL IA: NORMAL
HIV1 AB SERPL QL IA: NORMAL
HIV1 P24 AG SERPL QL IA: NORMAL
IMM GRANULOCYTES # BLD AUTO: 0.07 THOUSAND/UL (ref 0–0.2)
IMM GRANULOCYTES NFR BLD AUTO: 1 % (ref 0–2)
LYMPHOCYTES # BLD AUTO: 2.69 THOUSANDS/ÂΜL (ref 0.6–4.47)
LYMPHOCYTES NFR BLD AUTO: 21 % (ref 14–44)
MCH RBC QN AUTO: 32.6 PG (ref 26.8–34.3)
MCHC RBC AUTO-ENTMCNC: 34.7 G/DL (ref 31.4–37.4)
MCV RBC AUTO: 94 FL (ref 82–98)
MONOCYTES # BLD AUTO: 1.34 THOUSAND/ÂΜL (ref 0.17–1.22)
MONOCYTES NFR BLD AUTO: 11 % (ref 4–12)
NEUTROPHILS # BLD AUTO: 8.28 THOUSANDS/ÂΜL (ref 1.85–7.62)
NEUTS SEG NFR BLD AUTO: 63 % (ref 43–75)
NRBC BLD AUTO-RTO: 0 /100 WBCS
PA ADP BLD-ACNC: 9 PRU (ref 194–418)
PLATELET # BLD AUTO: 328 THOUSANDS/UL (ref 149–390)
PMV BLD AUTO: 10.3 FL (ref 8.9–12.7)
POTASSIUM SERPL-SCNC: 3.9 MMOL/L (ref 3.5–5.3)
RBC # BLD AUTO: 4.45 MILLION/UL (ref 3.88–5.62)
SODIUM SERPL-SCNC: 134 MMOL/L (ref 135–147)
WBC # BLD AUTO: 12.81 THOUSAND/UL (ref 4.31–10.16)

## 2023-05-03 RX ORDER — SODIUM CHLORIDE 9 MG/ML
75 INJECTION, SOLUTION INTRAVENOUS CONTINUOUS
Status: DISCONTINUED | OUTPATIENT
Start: 2023-05-04 | End: 2023-05-04

## 2023-05-03 RX ORDER — LORAZEPAM 2 MG/ML
1 INJECTION INTRAMUSCULAR EVERY 6 HOURS PRN
Status: DISCONTINUED | OUTPATIENT
Start: 2023-05-03 | End: 2023-05-10

## 2023-05-03 RX ORDER — HYDROMORPHONE HCL/PF 1 MG/ML
0.5 SYRINGE (ML) INJECTION EVERY 4 HOURS PRN
Status: DISCONTINUED | OUTPATIENT
Start: 2023-05-03 | End: 2023-05-04

## 2023-05-03 RX ADMIN — CITALOPRAM HYDROBROMIDE 20 MG: 20 TABLET ORAL at 09:41

## 2023-05-03 RX ADMIN — PANTOPRAZOLE SODIUM 20 MG: 20 TABLET, DELAYED RELEASE ORAL at 05:33

## 2023-05-03 RX ADMIN — GADOBUTROL 10 ML: 604.72 INJECTION INTRAVENOUS at 16:44

## 2023-05-03 RX ADMIN — OXYCODONE HYDROCHLORIDE 10 MG: 10 TABLET ORAL at 23:36

## 2023-05-03 RX ADMIN — METHOCARBAMOL 500 MG: 500 TABLET ORAL at 23:37

## 2023-05-03 RX ADMIN — DOCUSATE SODIUM 100 MG: 100 CAPSULE, LIQUID FILLED ORAL at 18:27

## 2023-05-03 RX ADMIN — TICAGRELOR 90 MG: 90 TABLET ORAL at 09:44

## 2023-05-03 RX ADMIN — HYDROCHLOROTHIAZIDE 25 MG: 25 TABLET ORAL at 09:40

## 2023-05-03 RX ADMIN — ACETAMINOPHEN 975 MG: 325 TABLET ORAL at 05:33

## 2023-05-03 RX ADMIN — ASPIRIN 81 MG: 81 TABLET, COATED ORAL at 15:28

## 2023-05-03 RX ADMIN — METHOCARBAMOL 500 MG: 500 TABLET ORAL at 05:33

## 2023-05-03 RX ADMIN — METOPROLOL SUCCINATE 100 MG: 100 TABLET, EXTENDED RELEASE ORAL at 09:41

## 2023-05-03 RX ADMIN — DONEPEZIL HYDROCHLORIDE 5 MG: 5 TABLET ORAL at 09:41

## 2023-05-03 RX ADMIN — HYDRALAZINE HYDROCHLORIDE 25 MG: 25 TABLET, FILM COATED ORAL at 09:41

## 2023-05-03 RX ADMIN — LOSARTAN POTASSIUM 50 MG: 50 TABLET, FILM COATED ORAL at 09:41

## 2023-05-03 RX ADMIN — METHOCARBAMOL 500 MG: 500 TABLET ORAL at 18:27

## 2023-05-03 RX ADMIN — AMLODIPINE BESYLATE 10 MG: 10 TABLET ORAL at 09:41

## 2023-05-03 RX ADMIN — ACETAMINOPHEN 975 MG: 325 TABLET ORAL at 13:04

## 2023-05-03 RX ADMIN — HYDROMORPHONE HYDROCHLORIDE 0.5 MG: 1 INJECTION, SOLUTION INTRAMUSCULAR; INTRAVENOUS; SUBCUTANEOUS at 15:52

## 2023-05-03 RX ADMIN — DONEPEZIL HYDROCHLORIDE 5 MG: 5 TABLET ORAL at 18:27

## 2023-05-03 RX ADMIN — TAMSULOSIN HYDROCHLORIDE 0.4 MG: 0.4 CAPSULE ORAL at 15:30

## 2023-05-03 RX ADMIN — OXYCODONE HYDROCHLORIDE 5 MG: 5 TABLET ORAL at 05:33

## 2023-05-03 RX ADMIN — APIXABAN 5 MG: 5 TABLET, FILM COATED ORAL at 09:41

## 2023-05-03 RX ADMIN — LORAZEPAM 1 MG: 2 INJECTION INTRAMUSCULAR; INTRAVENOUS at 15:52

## 2023-05-03 RX ADMIN — ATORVASTATIN CALCIUM 40 MG: 40 TABLET, FILM COATED ORAL at 09:40

## 2023-05-03 RX ADMIN — DOCUSATE SODIUM 100 MG: 100 CAPSULE, LIQUID FILLED ORAL at 09:41

## 2023-05-03 RX ADMIN — SODIUM CHLORIDE 75 ML/HR: 0.9 INJECTION, SOLUTION INTRAVENOUS at 23:37

## 2023-05-03 RX ADMIN — LIDOCAINE 5% 1 PATCH: 700 PATCH TOPICAL at 09:41

## 2023-05-03 RX ADMIN — METHOCARBAMOL 500 MG: 500 TABLET ORAL at 13:04

## 2023-05-03 RX ADMIN — TICAGRELOR 90 MG: 90 TABLET ORAL at 21:45

## 2023-05-03 RX ADMIN — HYDRALAZINE HYDROCHLORIDE 25 MG: 25 TABLET, FILM COATED ORAL at 21:45

## 2023-05-03 RX ADMIN — ACETAMINOPHEN 975 MG: 325 TABLET ORAL at 21:45

## 2023-05-03 RX ADMIN — HYDRALAZINE HYDROCHLORIDE 25 MG: 25 TABLET, FILM COATED ORAL at 15:28

## 2023-05-03 NOTE — ASSESSMENT & PLAN NOTE
· Noted on admission with WBC 12K, tachycardia  · Infectious workup unremarkable   · Procal levels negative   · Remains stable off of antibiotic   · Maybe from pain in the L spine vs reactive leukocytosis from stroke

## 2023-05-03 NOTE — ASSESSMENT & PLAN NOTE
Lab Results   Component Value Date    HGBA1C 5 8 (H) 04/27/2023       Recent Labs     05/02/23  1054 05/02/23  1623 05/02/23  2115 05/03/23  0625   POCGLU 115 127 141* 108       Blood Sugar Average: Last 72 hrs:  (P) 116 [id number]   · A1c 5 8  · Hold metformin while inpatient  · Diabetic diet  · Fingerstick QID with sliding scale coverage

## 2023-05-03 NOTE — PROGRESS NOTES
NEUROLOGY RESIDENCY PROGRESS NOTE     Name: Susan Tubbs   Age & Sex: 58 y o  male   MRN: [id number]  Unit/Bed#: [facility] 703-01   Encounter: [id number]    Susan Tubbs will need follow up in in 6 weeks with neurovascular attending  He will not require outpatient neurological testing, at this time  ASSESSMENT & PLAN     * Stroke [facility])  Assessment & Plan  58year old male with HTN, HLD, DM2, prior R MCA CVA s/p thrombectomy in March 2019 w/ no etiology found despite years of loop recorder (and was maintained on Plavix 75mg after that), known severe R M1 stenosis, prior tobacco use, recent L MCA stroke (had aphasia and was empirically AC eliquis 5mg BID w/ ASA 81mg at that time due to ESUSx2) 04/16/2023 (was on came in as a Stroke alert on 4/26/2023  3:30 PM with initial NIHSS of 2 (for LOC questions) and LKW 2:30pm, initial Blood Pressure: 154/71  Initial presenting deficits were L facial droop, R sided weakness, and AMS but on actual exam confused/altered unable to give correct month and correct age    As a result of past stroke within the past month and on eliquis pt was determined to not be a candidate for thrombolysis (TNK)  Has had previous Loop recorder that was negative for any arrhythmias  While here patient had noted new multifocal strokes  Switched from ASA 81mg to Brilinta 90mg BID  prior work-up including IVY (no PFO) and loop recorder without evidence of Afib  CT of chest abdomen and pelvis with no evidence of malignancy 04/17/2023  Thrombosis panel done in 04/18/2023 that was only abnl for AT3 that was low in the acute setting  Previous TTE 04/18/23 showed ED of 55% and nl wall motion and mildly dilated L atrium       • Exam on 05/01/23: appears aphasic unable to give correct month and correct age and expresses frustration, more frontal aphasia, paraphasic errors, R homonymous hemianopia, R leaning gait and small steppage   • Current Blood Pressure: 137/71, BP over 24 hours: BP Min: 110/89  Max: 137/71   • Vascular risk factors: past L MCA and R MCA stroke, DM, HTN, HLD  • Home meds: eliquis 5mg BID, ASA 81mg     Workup:  Lab Results   Component Value Date    HGBA1C 5 8 (H) 04/27/2023    CHOLESTEROL 109 04/27/2023    LDLCALC 40 04/27/2023    TRIG 166 (H) 04/27/2023    INR 1 15 04/26/2023      • CTH: No acute intracranial abnormality  Chronic microangiopathy  Small evolving subacute infarct in the left parietal periventricular white matter  Other small recent infarcts better visualized on previous MRI  • CTA: Stable moderate (60 to 65%) stenosis in the bilateral internal carotid arteries  Stable severe stenosis distal right M1 segment  Stable severe stenosis proximal left M2    • MRI: Compared to the prior recent MRI examination dated 4/17/2023, interval increase in size of diffusion abnormality in the left parietal subcortical and periventricular white matter consistent with acute to subacute ischemia  New small focus of gyral diffusion abnormality in the right frontal parietal region (4:20)  Persistent additional small foci of diffusion abnormality in the left periatrial and parieto-occipital region  No acute hemorrhage or midline shift  Stable moderate chronic microangiopathic changes within the brain  • Echocardiogram: EF of 55% and nl wall motion and mildly dilated L atrium  • IVY in 2019 nl and no soruce of cardiac embolism; LEFT ATRIUM: Size was normal  No thrombus was identified  APPENDAGE: The appendage was small  No thrombus was identified  DOPPLER: The function was mildly reduced (mildly reduced emptying velocity)  • Telemetry: negative  • Repeat Brain MRI: appears more evolution of past strokes moreso in L posterior parietal area, some noted  • TTE: ejection fraction is 55-60%  Systolic function is normal  Wall motion cannot be accurately assessed  lipomatous hypertrophy of the interatrial septum  No PFO  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    Left Atrial Appendage: There is reduced function  There is no thrombus  • Carotid Dopplers: STEVE <50% stenosis noted in the internal carotid artery  Plaque isheterogenous and irregular  LICA 89-74% stenosis noted in the internal carotid artery  Plaque is heterogenous and irregular  • Flow cyto: no abnormalities in blast in lymphoid or myeloid  • D-Dimer: 1 13 (elevated)  • ESR: 29  • CRP: 7 9      Pertinent scores:  - NIHSS: 2  Stroke Modified [place] Score: 0 (No baseline symptoms/disability)    Impression: New multiple strokes difficult to state whether cardioembolic give dilated L atrium as well as noted  Severe atherosclerosis in L and R MCA so cardioembolic vs atherembolic vs mixed picture vs central cause, further work-up needs to be done at this time    Plan:  - Discussed plan with neurology attending, Dr Yanique Dorman  - for noted atrial septal aneurysm on IVY, cardiology curbsided and stated unlikely to be contributing to strokes given no intracardiac shunts as well   Also given negative loop recorder unlikely to have intracardiac cause of stroke  - Antiplatelet agents: continue BRILINTA 90mg BID   - given that patient was on plavix when he had his stroke on 04/16/2023  - Continue home AC eliquis 5mg BID  - Given noted past history of prior cognitive impairment and previous MOCA of 18/30 in past neurology outpatient visits, would recommend outpatient neurocognitive evaluation  - BP: allow for normotension  - atorvastatin 40mg qhs  - Maintain glucose <180, SSI for coverage if indicated  - Medical management as per primary team appreciated  - continue w/ correction of any toxic/metabolic abnormalities  - DVT ppx and SCDs  - Monitor on telemetry  - PT/OT/Speech/PM&R input appreciated   - patient would likely benefit from speech therapy outpatient  - Stroke education  - rest of care as per primary team            Will call wife about updates     SUBJECTIVE     Patient was seen and examined   Yesterday  patient slipped and experienced a slow fall to the floor when attempting to reach for his walker and move from the stretcher to the bed  He denied any pain or radiation of pain  Per RN he slid to his buttocks and did not hit his head  No spinal tenderness or ecchymosis noted    This AM, patient was found by staff at the chair and left on his own and urinated himself as he could not wait for staff to evaluate him  He also had some skin tears in his lower back/buttocks after was noted to be sitting on a wash basin and deodorant and did not complain or mention it  Patient stated he could not wait to go the the bathroom  He was unaware in regards to him sitting on the deodorant and wash basin  He denies any headaches, syncope, paresthesias, diplopia, visual changes, tinnitus, sudden onset weakness, garbled speech, dysarthria/slurring of words,  Loss of bowel or bladder      Review of Systems   Unable to perform ROS: Other     Patient is aphasic so unreliable historian     OBJECTIVE     Patient ID: Neeta Arreola is a 58 y o  male  Vitals:    23 1529 23 2121 23 2132 23 0602   BP: 122/54 (!) 90/43 137/62 141/69   BP Location:   Left arm    Pulse: 59 76 84 66   Resp:   20    Temp:  98 3 °F (36 8 °C)     TempSrc:       SpO2: 98% 96% 94% 98%   Weight:       Height:          Temperature:   Temp (24hrs), Av °F (36 7 °C), Min:97 7 °F (36 5 °C), Max:98 3 °F (36 8 °C)    Temperature: 98 3 °F (36 8 °C)      Physical Exam     Neurologic Exam     Gait, Coordination, and Reflexes     Gait  Gait: wide-based         Physical Exam  Vitals and nursing note reviewed  Constitutional:       General: He is not in acute distress  Appearance: He is well-developed  HENT:      Head: Normocephalic and atraumatic  Eyes:      Extraocular Movements: EOM normal       Conjunctiva/sclera: Conjunctivae normal       Pupils: Pupils are equal, round, and reactive to light  Cardiovascular:      Rate and Rhythm: Normal rate     Pulmonary: Effort: Pulmonary effort is normal    Abdominal:      Palpations: Abdomen is soft  Tenderness: There is no abdominal tenderness  Musculoskeletal:         General: No swelling  Cervical back: Neck supple  Skin:     General: Skin is warm and dry  Capillary Refill: Capillary refill takes less than 2 seconds  Neurological:      Mental Status: He is alert and oriented to person, place, and time  Motor: Motor strength is normal       Coordination: Finger-Nose-Finger Test and Heel to [facility] Test normal       Deep Tendon Reflexes:      Reflex Scores:       Tricep reflexes are 2+ on the right side and 2+ on the left side  Bicep reflexes are 2+ on the right side and 2+ on the left side  Brachioradialis reflexes are 2+ on the right side and 2+ on the left side  Patellar reflexes are 2+ on the right side and 2+ on the left side  Achilles reflexes are 2+ on the right side and 2+ on the left side  Psychiatric:         Mood and Affect: Mood normal          Speech: Speech normal          Neurologic Exam      Mental Status   Oriented to person, place,    Disoriented to time and season  Speech: speech is normal   Knowledge: good  Able to name object  Able to repeat   paraphasic errors and perseveration    Was able to name clock but got time incorrect   Had difficulty w/ 2-step commands   Has right-left disorientation   Has noted finger agnosia  Acalculia   No apraxia appreciated  Patient was able to repeat without any issues    Cranial Nerves      CN II   BTT intact bilaterally      CN III, IV, VI   Pupils are equal, round, and reactive to light    Extraocular motions are normal       CN V   Facial sensation intact       CN VII   Facial expression full, symmetric       CN VIII   CN VIII normal       CN IX, X   CN IX normal    CN X normal       CN XI   CN XI normal       CN XII   CN XII normal       Motor Exam   Overall muscle tone: normal     Strength   Strength 5/5 throughout except   4+/5 in R hip flexor     Sensory Exam   Light touch normal    Pinprick normal       Gait, Coordination, and Reflexes      Coordination   Finger to nose coordination: Patient was able to do finger-nose but had very to much difficulty following the problems particular with his left side    Heel to shin coordination: normal     Reflexes   Right brachioradialis: 2+  Left brachioradialis: 2+  Right biceps: 2+  Left biceps: 2+  Right triceps: 2+  Left triceps: 2+  Right patellar: 2+  Left patellar: 2+  Right achilles: 2+  Left achilles: 2+  Right plantar: normal  Left plantar: normal  Right ankle clonus: absent  Left ankle clonus: absent     On gait exam noted may be can noted wide-based gait patient was leaning on the right and is taking smaller steps with his right leg and dragging RLE when using the walker      LABORATORY DATA     Labs: I have personally reviewed pertinent reports  and I have personally reviewed pertinent films in PACS  Results from last 7 days   Lab Units 05/03/23  0553 05/02/23  0524 05/01/23  0617   WBC Thousand/uL 12 81* 11 98* 11 96*   HEMOGLOBIN g/dL 14 5 14 9 15 6   HEMATOCRIT % 41 8 43 3 46 1   PLATELETS Thousands/uL 328 290 292   NEUTROS PCT % 63  --   --    MONOS PCT % 11  --   --       Results from last 7 days   Lab Units 05/03/23  0553 05/02/23  0524 05/01/23  0617   SODIUM mmol/L 134* 134* 132*   POTASSIUM mmol/L 3 9 3 3* 4 7   CHLORIDE mmol/L 104 102 101   CO2 mmol/L 27 26 27   BUN mg/dL 37* 41* 36*   CREATININE mg/dL 1 20 1 20 1 19   CALCIUM mg/dL 9 6 9 8 9 8     Results from last 7 days   Lab Units 04/27/23  0517 04/26/23  1540   MAGNESIUM mg/dL 2 4 2 4     Results from last 7 days   Lab Units 04/27/23  0517 04/26/23  1540   PHOSPHORUS mg/dL 3 6 3 5      Results from last 7 days   Lab Units 04/26/23  1540   INR  1 15   PTT seconds 34               IMAGING & DIAGNOSTIC TESTING     Radiology Results: I have personally reviewed pertinent reports     and I have personally reviewed pertinent films in PACS    VAS carotid complete study   Final Result by Mane Rivera MD (05/02 2028)      VAS lower limb venous duplex study, complete bilateral   Final Result by Arjun Reyna MD (05/02 1449)      MRI follow up neuro   Final Result by Shan Edmond MD (05/02 1215)      XR chest pa & lateral   Final Result by Angeles Varghese MD (04/28 1019)      No acute cardiopulmonary disease  Workstation performed: [id number]         MRI brain wo contrast   Final Result by Jud Mendoza MD (04/27 0439)      Compared to the prior recent MRI examination dated 4/17/2023, interval increase in size of diffusion abnormality in the left parietal subcortical and periventricular white matter consistent with acute to subacute ischemia  New small focus of gyral diffusion abnormality in the right frontal parietal region (4:20)  Persistent additional small foci of diffusion abnormality in the left periatrial and parieto-occipital region  No acute hemorrhage or midline shift  Stable moderate chronic microangiopathic changes within the brain  Study was marked in [facility] for immediate notification  Workstation performed: [id number]         CTA stroke alert (head/neck)   Final Result by SUSAN Diez MD (04/26 1611)   Stable moderate (60 to 65%) stenosis in the bilateral internal carotid arteries  Stable severe stenosis distal right M1 segment  Stable severe stenosis proximal left M2  No new intracranial stenosis or large vessel occlusion  Findings were directly discussed with Westshyla Adjutant at 3:42 p m  Workstation performed: [id number]         CT stroke alert brain   Final Result by SUSAN Diez MD (04/26 0164)      No acute intracranial abnormality  Chronic microangiopathy  Small evolving subacute infarct in the left parietal periventricular white matter  Other small recent infarcts better visualized on previous MRI        Findings were directly discussed with Adonis Dominguez at 3:42 p m  Workstation performed: [id number]         MRI lumbar spine w wo contrast    (Results Pending)   MRI brain wo contrast    (Results Pending)       Other Diagnostic Testing: I have personally reviewed pertinent reports     and I have personally reviewed pertinent films in PACS    ACTIVE MEDICATIONS     Current Facility-Administered Medications   Medication Dose Route Frequency   • acetaminophen (TYLENOL) tablet 975 mg  975 mg Oral Q8H [address]   • amLODIPine (NORVASC) tablet 10 mg  10 mg Oral Daily   • apixaban (ELIQUIS) tablet 5 mg  5 mg Oral BID   • atorvastatin (LIPITOR) tablet 40 mg  40 mg Oral Daily   • calcium carbonate (TUMS) chewable tablet 1,000 mg  1,000 mg Oral Daily PRN   • citalopram (CeleXA) tablet 20 mg  20 mg Oral Daily   • docusate sodium (COLACE) capsule 100 mg  100 mg Oral BID   • donepezil (ARICEPT) tablet 5 mg  5 mg Oral BID   • hydrALAZINE (APRESOLINE) tablet 25 mg  25 mg Oral TID   • hydrochlorothiazide (HYDRODIURIL) tablet 25 mg  25 mg Oral Daily   • insulin lispro (HumaLOG) 100 units/mL subcutaneous injection 1-6 Units  1-6 Units Subcutaneous TID AC   • insulin lispro (HumaLOG) 100 units/mL subcutaneous injection 1-6 Units  1-6 Units Subcutaneous HS   • lidocaine (LIDODERM) 5 % patch 1 patch  1 patch Topical Daily   • losartan (COZAAR) tablet 50 mg  50 mg Oral Daily   • methocarbamol (ROBAXIN) tablet 500 mg  500 mg Oral Q6H JOSE   • metoprolol succinate (TOPROL-XL) 24 hr tablet 100 mg  100 mg Oral Daily   • nicotine (NICODERM CQ) 14 mg/24hr TD 24 hr patch 14 mg  14 mg Transdermal Daily   • ondansetron (ZOFRAN) injection 4 mg  4 mg Intravenous Q6H PRN   • oxyCODONE (ROXICODONE) immediate release tablet 10 mg  10 mg Oral Q4H PRN   • oxyCODONE (ROXICODONE) IR tablet 5 mg  5 mg Oral Q4H PRN   • pantoprazole (PROTONIX) EC tablet 20 mg  20 mg Oral Early Morning   • senna (SENOKOT) tablet 17 2 mg  2 tablet Oral Daily PRN   • tamsulosin (FLOMAX) capsule 0 4 mg  0 4 mg Oral Daily With Dinner   • ticagrelor (BRILINTA) tablet 90 mg  90 mg Oral Q12H [facility] & long term       Prior to Admission medications    Medication Sig Start Date End Date Taking?  Authorizing Provider   amLODIPine (NORVASC) 10 mg tablet Take 10 mg by mouth daily   Yes Historical Provider, MD   apixaban (Eliquis) 5 mg Take 1 tablet (5 mg total) by mouth 2 (two) times a day 4/18/23  Yes Aminah WHYTE PA-C   aspirin (ECOTRIN LOW STRENGTH) 81 mg EC tablet Take 1 tablet (81 mg total) by mouth daily Do not start before April 19, 2023 4/19/23  Yes Aminah WHYTE PA-C   atorvastatin (LIPITOR) 40 mg tablet Take 40 mg by mouth daily with breakfast   Yes Historical Provider, MD   citalopram (CeleXA) 20 mg tablet Take 20 mg by mouth daily   Yes Historical Provider, MD   donepezil (ARICEPT) 5 mg tablet TAKE 1 TABLET TWICE A DAY 2/7/23  Yes Melanie Nickerson MD   hydrALAZINE (APRESOLINE) 25 mg tablet Take 25 mg by mouth 3 (three) times a day   Yes Historical Provider, MD   hydrochlorothiazide (HYDRODIURIL) 25 mg tablet Take 25 mg by mouth daily   Yes Historical Provider, MD   lidocaine (Lidoderm) 5 % Apply 1 patch topically over 12 hours daily Remove & Discard patch within 12 hours or as directed by MD 4/26/23  Yes Yecenia Rendon DO   losartan (COZAAR) 50 mg tablet Take 50 mg by mouth daily   Yes Historical Provider, MD   metFORMIN (GLUCOPHAGE) 500 mg tablet Take 1 tablet (500 mg total) by mouth daily with breakfast 4/6/19  Yes Oliver Thompson MD   metoprolol succinate (TOPROL-XL) 100 mg 24 hr tablet Take 100 mg by mouth daily   Yes Historical Provider, MD   omeprazole (PriLOSEC OTC) 20 MG tablet Take 20 mg by mouth daily   Yes Historical Provider, MD   sildenafil (REVATIO) 20 mg tablet TAKE 2 3 TABLETS BY MOUTH AS NEEDED FOR SEXUAL ACTIVITY 7/12/19  Yes Historical Provider, MD   tamsulosin (FLOMAX) 0 4 mg Take 0 4 mg by mouth daily with breakfast   Yes Historical Provider, MD         VTE Pharmacologic Prophylaxis: as per primary  VTE Mechanical Prophylaxis: sequential compression device    ==  Electa Claude, DO [address] Neurology Residency, PGY-2

## 2023-05-03 NOTE — ASSESSMENT & PLAN NOTE
· Patient presented with AMS, facial droop, and weakness  · Mostly resolved on admission  · H/o stroke with R M1 thrombectomy in 2019  · Admitted with concern for recrudescence of previous stroke like symptoms     Imaging:   · MRI brain 5/2/2023: progressive enlargement of the L parietal region of ischemia noted on DWI  No evidence of right sided ischemia  Formal read pending   · MRI brain 4/26/2023: interval decrease in size of diffusion abnormality in L parietal subcortical region  New small focus in the R frontoparietal region  Small foci in the L parietal and parieto-occipital region  No hemorrhage  Plan:  · Continue to monitor neurological exam   · Ongoing medical management   · Neurology following for stroke care   · Concern for vasculitis given bilateral cerebral involvement based on MRI brain 4/26 and progression of L parietal stroke on most recent MRI brain  · Reviewed with endovascular attending- plan for cerebral angiogram tomorrow with Dr Fadumo Whitley  · Will continue to follow

## 2023-05-03 NOTE — ASSESSMENT & PLAN NOTE
Lab Results   Component Value Date    HGBA1C 5 8 (H) 04/27/2023       Recent Labs     05/02/23  1623 05/02/23  2115 05/03/23  0625 05/03/23  1131   POCGLU 127 141* 108 135       Blood Sugar Average: Last 72 hrs:  (P) 117 [id number]   · A1c 5 8  · Hold metformin while inpatient  · Diabetic diet  · Fingerstick QID with sliding scale coverage

## 2023-05-03 NOTE — PLAN OF CARE
Problem: PHYSICAL THERAPY ADULT  Goal: Performs mobility at highest level of function for planned discharge setting  See evaluation for individualized goals  Description:    Equipment Recommended:  (TBD)       See flowsheet documentation for full assessment, interventions and recommendations  Outcome: Progressing  Note: Prognosis: Good  Problem List: Decreased strength, Decreased endurance, Impaired balance, Decreased mobility, Decreased coordination, Decreased cognition, Impaired judgement, Decreased safety awareness  Assessment: Pt agreeable to participate in PT session  Pt performed functional mobility and therex as outlined above  Pt with short shuffling, ataxic steps while ambulating  Trial of tape on floor for step length visual cues however pt unable to follow commands  Opted for part practice of stepping fwd/bwd on tape with single LE  Able to perform with LLE with increased cues however unable to follow directions despite all attempts with RLE  After switching to closed environment and performing in sitting, able to perform with increased cues and time in standing with RLE  Pt with expressive aphasia and ?apraxia of greater than 1 step commands as well as ataxia and poor motor planning  Pt left supine in bed with bed alarm, call bell, phone, and all personal needs within reach  Pt will continue to benefit from skilled acute care PT to further address their functional mobility limitations  D/C recommendations remain rehab  Barriers to Discharge: Inaccessible home environment, Decreased caregiver support     PT Discharge Recommendation: Post acute rehabilitation services    See flowsheet documentation for full assessment

## 2023-05-03 NOTE — CONSULTS
[address] [facility]  Consult  Name: Eusebia Wagner 58 y o  male I MRN: [id number]  [facility] I Date of Admission: 4/26/2023   Date of Service: 5/3/2023 I Hospital Day: 7    Inpatient consult to Neurosurgery  Consult performed by: Manolo Davenport PA-C  Consult ordered by: [facility],         Assessment/Plan   * Stroke [facility])  Assessment & Plan  · Patient presented with AMS, facial droop, and weakness  · Mostly resolved on admission  · H/o stroke with R M1 thrombectomy in 2019  · Admitted with concern for recrudescence of previous stroke like symptoms     Imaging:   · MRI brain 5/2/2023: progressive enlargement of the L parietal region of ischemia noted on DWI  No evidence of right sided ischemia  Formal read pending   · MRI brain 4/26/2023: interval decrease in size of diffusion abnormality in L parietal subcortical region  New small focus in the R frontoparietal region  Small foci in the L parietal and parieto-occipital region  No hemorrhage  Plan:  · Continue to monitor neurological exam   · Ongoing medical management   · Neurology following for stroke care   · Concern for vasculitis given bilateral cerebral involvement based on MRI brain 4/26 and progression of L parietal stroke on most recent MRI brain  · Reviewed with endovascular attending- plan for cerebral angiogram tomorrow with Dr Porsha Crowder  · Will continue to follow  Bilateral carotid artery stenosis  Assessment & Plan  · Bilateral carotic artery disease known   · Heterogeneous and irregular plaque bilaterally          History of Present Illness   HPI: Eusebia Wagner is a 58 y o  male with PMH including stroke, diabetes, hyperlipidemia, hypertension, depression who presents with complaint of stoke like symptoms  Patient has a complicated history  He was seen by the neurosurgical service in march 2019 for R M2 occlusion   He was monitored and noted to have progression of his symptoms requiring mechanical thrombectomy  He still had some very mild residual deficits  Patient presented with change in mental status  He was noted to have alterations of his speech as well as some weakness  On admission patient had near resolution of his symptoms  He had now obtained an MRI brain on 4/26 and 5/2  Both show concern for new strokes  Given the progression of his imaging in a focal area neurology has requested a neurosurgical consultation for cerebral angiogram  Currently still with some expressive trouble  No weakness noted  Some L dysmetria      Review of Systems   Constitutional: Positive for activity change and fatigue  Negative for appetite change and diaphoresis  HENT: Negative for congestion, facial swelling and voice change  Eyes: Negative for visual disturbance  Respiratory: Negative for cough and chest tightness  Cardiovascular: Negative for chest pain and leg swelling  Genitourinary: Negative for difficulty urinating  Musculoskeletal: Positive for gait problem  Negative for back pain, neck pain and neck stiffness  Neurological: Positive for speech difficulty, weakness and numbness  Negative for dizziness and headaches  Psychiatric/Behavioral: Negative for agitation, behavioral problems and confusion         Historical Information   Past Medical History:   Diagnosis Date   • Depression    • Diabetes mellitus ([facility] [address] )    • Dyslipidemia 03/26/2019   • Hyperlipidemia    • Hypertension    • Stroke [facility])      Past Surgical History:   Procedure Laterality Date   • BACK SURGERY     • IR STROKE ALERT  3/19/2019   • SHOULDER SURGERY       Social History     Substance and Sexual Activity   Alcohol Use Not Currently     Social History     Substance and Sexual Activity   Drug Use Never     Social History     Tobacco Use   Smoking Status Former   Smokeless Tobacco Never     Family History   Problem Relation Age of Onset   • Heart attack Mother    • Diabetes Mother    • Prostate cancer Father Meds/Allergies   all current active meds have been reviewed, current meds:   Current Facility-Administered Medications   Medication Dose Route Frequency   • acetaminophen (TYLENOL) tablet 975 mg  975 mg Oral Q8H [address]   • amLODIPine (NORVASC) tablet 10 mg  10 mg Oral Daily   • aspirin (ECOTRIN LOW STRENGTH) EC tablet 81 mg  81 mg Oral Daily   • atorvastatin (LIPITOR) tablet 40 mg  40 mg Oral Daily   • calcium carbonate (TUMS) chewable tablet 1,000 mg  1,000 mg Oral Daily PRN   • citalopram (CeleXA) tablet 20 mg  20 mg Oral Daily   • docusate sodium (COLACE) capsule 100 mg  100 mg Oral BID   • donepezil (ARICEPT) tablet 5 mg  5 mg Oral BID   • hydrALAZINE (APRESOLINE) tablet 25 mg  25 mg Oral TID   • hydrochlorothiazide (HYDRODIURIL) tablet 25 mg  25 mg Oral Daily   • HYDROmorphone (DILAUDID) injection 0 5 mg  0 5 mg Intravenous Q4H PRN   • insulin lispro (HumaLOG) 100 units/mL subcutaneous injection 1-6 Units  1-6 Units Subcutaneous TID AC   • insulin lispro (HumaLOG) 100 units/mL subcutaneous injection 1-6 Units  1-6 Units Subcutaneous HS   • lidocaine (LIDODERM) 5 % patch 1 patch  1 patch Topical Daily   • LORazepam (ATIVAN) injection 1 mg  1 mg Intravenous Q6H PRN   • losartan (COZAAR) tablet 50 mg  50 mg Oral Daily   • methocarbamol (ROBAXIN) tablet 500 mg  500 mg Oral Q6H JOSE   • metoprolol succinate (TOPROL-XL) 24 hr tablet 100 mg  100 mg Oral Daily   • nicotine (NICODERM CQ) 14 mg/24hr TD 24 hr patch 14 mg  14 mg Transdermal Daily   • ondansetron (ZOFRAN) injection 4 mg  4 mg Intravenous Q6H PRN   • oxyCODONE (ROXICODONE) immediate release tablet 10 mg  10 mg Oral Q4H PRN   • oxyCODONE (ROXICODONE) IR tablet 5 mg  5 mg Oral Q4H PRN   • pantoprazole (PROTONIX) EC tablet 20 mg  20 mg Oral Early Morning   • senna (SENOKOT) tablet 17 2 mg  2 tablet Oral Daily PRN   • [START ON 5/4/2023] sodium chloride 0 9 % infusion  75 mL/hr Intravenous Continuous   • tamsulosin (FLOMAX) capsule 0 4 mg  0 4 mg Oral Daily With Dinner   • ticagrelor (BRILINTA) tablet 90 mg  90 mg Oral Q12H [address]    and PTA meds:   Prior to Admission Medications   Prescriptions Last Dose Informant Patient Reported? Taking? amLODIPine (NORVASC) 10 mg tablet 4/26/2023  Yes Yes   Sig: Take 10 mg by mouth daily   apixaban (Eliquis) 5 mg 4/26/2023  No Yes   Sig: Take 1 tablet (5 mg total) by mouth 2 (two) times a day   aspirin (ECOTRIN LOW STRENGTH) 81 mg EC tablet 4/26/2023  No Yes   Sig: Take 1 tablet (81 mg total) by mouth daily Do not start before April 19, 2023     atorvastatin (LIPITOR) 40 mg tablet 4/26/2023  Yes Yes   Sig: Take 40 mg by mouth daily with breakfast   citalopram (CeleXA) 20 mg tablet 4/26/2023  Yes Yes   Sig: Take 20 mg by mouth daily   donepezil (ARICEPT) 5 mg tablet 4/26/2023  No Yes   Sig: TAKE 1 TABLET TWICE A DAY   hydrALAZINE (APRESOLINE) 25 mg tablet 4/26/2023  Yes Yes   Sig: Take 25 mg by mouth 3 (three) times a day   hydrochlorothiazide (HYDRODIURIL) 25 mg tablet 4/26/2023  Yes Yes   Sig: Take 25 mg by mouth daily   lidocaine (Lidoderm) 5 % 4/26/2023  No Yes   Sig: Apply 1 patch topically over 12 hours daily Remove & Discard patch within 12 hours or as directed by MD   losartan (COZAAR) 50 mg tablet 4/26/2023  Yes Yes   Sig: Take 50 mg by mouth daily   metFORMIN (GLUCOPHAGE) 500 mg tablet 4/26/2023  No Yes   Sig: Take 1 tablet (500 mg total) by mouth daily with breakfast   metoprolol succinate (TOPROL-XL) 100 mg 24 hr tablet 4/26/2023  Yes Yes   Sig: Take 100 mg by mouth daily   omeprazole (PriLOSEC OTC) 20 MG tablet 4/26/2023  Yes Yes   Sig: Take 20 mg by mouth daily   sildenafil (REVATIO) 20 mg tablet Past Month  Yes Yes   Sig: TAKE 2 3 TABLETS BY MOUTH AS NEEDED FOR SEXUAL ACTIVITY   tamsulosin (FLOMAX) 0 4 mg 4/26/2023  Yes Yes   Sig: Take 0 4 mg by mouth daily with breakfast      Facility-Administered Medications: None     Allergies   Allergen Reactions   • Flexeril [Cyclobenzaprine] Drowsiness   • Lisinopril Cough   • Nsaids Confusion       Objective   I/O         05/01 0701 05/02 0700 05/02 0701 05/03 0700 05/03 0701  05/04 0700    P  O  125  360    I V  (mL/kg)  700 (6 8)     Total Intake(mL/kg) 125 (1 2) 700 (6 8) 360 (3 5)    Urine (mL/kg/hr)  600 (0 2) 200 (0 2)    Total Output  600 200    Net +125 +100 +160           Unmeasured Urine Occurrence   1 x            Physical Exam  Constitutional:       Appearance: Normal appearance  He is well-developed  HENT:      Head: Normocephalic  Eyes:      Extraocular Movements: Extraocular movements intact and EOM normal       Pupils: Pupils are equal, round, and reactive to light  Neck:      Vascular: No JVD  Cardiovascular:      Rate and Rhythm: Normal rate  Pulmonary:      Effort: Pulmonary effort is normal    Musculoskeletal:         General: No deformity  Normal range of motion  Cervical back: Normal range of motion and neck supple  Skin:     General: Skin is warm and dry  Neurological:      Mental Status: He is alert and oriented to person, place, and time  Cranial Nerves: No cranial nerve deficit  Sensory: No sensory deficit  Motor: Motor strength is normal  No weakness  Coordination: Finger-Nose-Finger Test abnormal (dysmetria on the L)  Deep Tendon Reflexes: Reflexes are normal and symmetric  Psychiatric:         Speech: Speech normal          Behavior: Behavior normal          Thought Content: Thought content normal        Neurologic Exam     Mental Status   Oriented to person, place, and time  Attention: normal    Speech: speech is normal   Level of consciousness: alert  Knowledge: good  Normal comprehension  Normal and clear speech with expressive trouble      Cranial Nerves     CN III, IV, VI   Pupils are equal, round, and reactive to light  Extraocular motions are normal    Upgaze: normal  Downgaze: normal    CN V   Facial sensation intact  CN VII   Facial expression full, symmetric       CN VIII   CN VIII normal  "  Hearing: intact    Motor Exam   Muscle bulk: normal  Right arm tone: normal  Left arm tone: normal  Right leg tone: normal  Left leg tone: normal    Strength   Strength 5/5 throughout  No apparent weakness     Sensory Exam   Light touch normal      Gait, Coordination, and Reflexes     Coordination   Finger to nose coordination: abnormal (dysmetria on the L)    Tremor   Resting tremor: absent  Action tremor: absent        Vitals:Blood pressure 125/74, pulse 84, temperature 98 3 °F (36 8 °C), resp  rate 20, height 5' 8\" (1 727 m), weight 103 kg (227 lb), SpO2 95 %  ,Body mass index is 34 52 kg/m²  Lab Results:   Results from last 7 days   Lab Units 05/03/23  0553 05/02/23  0524 05/01/23  0617   WBC Thousand/uL 12 81* 11 98* 11 96*   HEMOGLOBIN g/dL 14 5 14 9 15 6   HEMATOCRIT % 41 8 43 3 46 1   PLATELETS Thousands/uL 328 290 292   NEUTROS PCT % 63  --   --    MONOS PCT % 11  --   --      Results from last 7 days   Lab Units 05/03/23  0553 05/02/23  0524 05/01/23  0617   POTASSIUM mmol/L 3 9 3 3* 4 7   CHLORIDE mmol/L 104 102 101   CO2 mmol/L 27 26 27   BUN mg/dL 37* 41* 36*   CREATININE mg/dL 1 20 1 20 1 19   CALCIUM mg/dL 9 6 9 8 9 8     Results from last 7 days   Lab Units 04/27/23  0517   MAGNESIUM mg/dL 2 4     Results from last 7 days   Lab Units 04/27/23  0517   PHOSPHORUS mg/dL 3 6         No results found for: TROPONINT  ABG:No results found for: PHART, MIY4IIL, PO2ART, IXY3AQD, N2VOXDRF, BEART, SOURCE    Imaging Studies: I have personally reviewed pertinent reports  and I have personally reviewed pertinent films in PACS    XR chest pa & lateral    Result Date: 4/28/2023  Impression: No acute cardiopulmonary disease  Workstation performed: [id number]     X-ray lumbar spine complete 4+ views    Result Date: 4/26/2023  Impression: No lumbar vertebral body fracture or subluxation  Multilevel degenerative changes   Workstation performed: [id number]     MRI brain wo contrast    Result Date: " 4/27/2023  Impression: Compared to the prior recent MRI examination dated 4/17/2023, interval increase in size of diffusion abnormality in the left parietal subcortical and periventricular white matter consistent with acute to subacute ischemia  New small focus of gyral diffusion abnormality in the right frontal parietal region (4:20)  Persistent additional small foci of diffusion abnormality in the left periatrial and parieto-occipital region  No acute hemorrhage or midline shift  Stable moderate chronic microangiopathic changes within the brain  Study was marked in [facility] for immediate notification  Workstation performed: [id number]     CT stroke alert brain    Result Date: 4/26/2023  Impression: No acute intracranial abnormality  Chronic microangiopathy  Small evolving subacute infarct in the left parietal periventricular white matter  Other small recent infarcts better visualized on previous MRI  Findings were directly discussed with Manolo Stewart at 3:42 p m  Workstation performed: [id number]     CTA stroke alert (head/neck)    Result Date: 4/26/2023  Impression: Stable moderate (60 to 65%) stenosis in the bilateral internal carotid arteries  Stable severe stenosis distal right M1 segment  Stable severe stenosis proximal left M2  No new intracranial stenosis or large vessel occlusion  Findings were directly discussed with Manolo Stewart at 3:42 p m  Workstation performed: [id number]       EKG, Pathology, and Other Studies: I have personally reviewed pertinent reports  VTE Prophylaxis: Sequential compression device Brisa Valencia     Code Status: Level 1 - Full Code  Advance Directive and Living Will:      Power of :    POLST:      Counseling / Coordination of Care  I spent 30 minutes with the patient

## 2023-05-03 NOTE — ASSESSMENT & PLAN NOTE
Presented to the ER as a stroke alert with history of known severe R M1 stenosis, prior tobacco use, recent L MCA stroke (residual aphasia) 04/16/2023 & came in as a Stroke alert on 4/26/2023  NIHSS of 2  Initial presenting deficits were L facial droop, R sided weakness, and AMS  As an inpatient patient developed even more neuro sxs including   R homonymous hemianopsia, Rightward leaning with ambulation  · Neurology following, appreciate recommendations   · Started on Brilinta 4/27/2023  · Continue statin  · There is concern for vasculitis  · Holding eliquis in anticipation of LP on either 5/4 or 5/5  · Ophthalmology consult for fundoscopic exam  · MRI brain w/ contrast still pending with anticipation for pre-medication with dilaudid and ativan so he can complete the studies

## 2023-05-03 NOTE — RESTORATIVE TECHNICIAN NOTE
Restorative Technician Note      Patient Name: Aron Rush     Note Type: Mobility  Patient Position Upon Consult: Bedside chair  Activity Performed: Ambulated; Dangled; Stood  Assistive Device: Roller walker; Other (Comment) (Assist x1 with chair follow)  Education Provided: Yes  Patient Position at End of Consult: Supine;  All needs within reach; Bed/Chair alarm activated  [facility] VALERIO REYNA, Restorative Technician, [employer]

## 2023-05-03 NOTE — ASSESSMENT & PLAN NOTE
Reports chronic lower back pain for 40 years with acute flare x 4 weeks which has not improved despite multiple trails of steroids, nsaids and muscle relaxants from the PCP  · XR with degenerative changes in L spine  · Pt was reporting worsening low back pain, right side weakness and difficulty urinating, decreased sensation on the buttock/saddle (sitting on items in his chair and he did not notice it)  Defer MRI L spine for now, work up for stroke as above  Neuro dc the MRI L spine prefer stroke/vasculitis for etiology of sxs at this time  Can see how the work up as above goes and then re-order the MRI L spine later if the pain is still uncontrolled    · Schedule tylenol, lidocaine patch, robaxin, prn oxy   · PMR evaluating for placement  · No worsening in back pain

## 2023-05-03 NOTE — PLAN OF CARE
Problem: PAIN - ADULT  Goal: Verbalizes/displays adequate comfort level or baseline comfort level  Description: Interventions:  - Encourage patient to monitor pain and request assistance  - Assess pain using appropriate pain scale  - Administer analgesics based on type and severity of pain and evaluate response  - Implement non-pharmacological measures as appropriate and evaluate response  - Consider cultural and social influences on pain and pain management  - Notify physician/advanced practitioner if interventions unsuccessful or patient reports new pain  Outcome: Progressing     Problem: INFECTION - ADULT  Goal: Absence or prevention of progression during hospitalization  Description: INTERVENTIONS:  - Assess and monitor for signs and symptoms of infection  - Monitor lab/diagnostic results  - Monitor all insertion sites, i e  indwelling lines, tubes, and drains  - Monitor endotracheal if appropriate and nasal secretions for changes in amount and color  - Rowe appropriate cooling/warming therapies per order  - Administer medications as ordered  - Instruct and encourage patient and family to use good hand hygiene technique  - Identify and instruct in appropriate isolation precautions for identified infection/condition  Outcome: Progressing

## 2023-05-03 NOTE — PROGRESS NOTES
[facility]  Progress Note  Name: Tete Osborne  MRN: [id number]  Unit/Bed#: [facility] 703-01 I Date of Admission: 4/26/2023   Date of Service: 5/3/2023 I Hospital Day: 7    Assessment/Plan   * Stroke [facility])  Assessment & Plan  Presented to the ER as a stroke alert with history of known severe R M1 stenosis, prior tobacco use, recent L MCA stroke (residual aphasia) 04/16/2023 & came in as a Stroke alert on 4/26/2023  NIHSS of 2  Initial presenting deficits were L facial droop, R sided weakness, and AMS  As an inpatient patient developed even more neuro sxs including   R homonymous hemianopsia, Rightward leaning with ambulation  · Neurology following, appreciate recommendations   · Started on Brilinta 4/27/2023  · Continue statin  · There is concern for vasculitis  · Holding eliquis in anticipation of LP on either 5/4 or 5/5  · Ophthalmology consult for fundoscopic exam  · MRI brain w/ contrast still pending with anticipation for pre-medication with dilaudid and ativan so he can complete the studies  SIRS (systemic inflammatory response syndrome) (HCC)  Assessment & Plan  · Noted on admission with WBC 12K, tachycardia  · Infectious workup unremarkable   · Procal levels negative   · Remains stable off of antibiotic   · Maybe from pain in the L spine vs reactive leukocytosis from stroke  Chronic low back pain  Assessment & Plan  Reports chronic lower back pain for 40 years with acute flare x 4 weeks which has not improved despite multiple trails of steroids, nsaids and muscle relaxants from the PCP  · XR with degenerative changes in L spine  · Pt was reporting worsening low back pain, right side weakness and difficulty urinating, anesthesia on the buttock/saddle (sitting on items in his chair and he did not notice it)  Defer MRI L spine for now, work up for stroke as above  Neuro dc the MRI L spine prefer stroke/vasculitis for etiology of sxs at this time   Can see how the work up as above goes and then re-order the MRI L spine later if the pain is still uncontrolled    · Schedule tylenol, lidocaine patch, add robaxin, prn oxy   · PMR evaluating for placement    Prediabetes  Assessment & Plan  · A1c 5 8  · Currently holding metformin while hospitalized, resume on discharge   · Carb controlled diet     Hyponatremia  Assessment & Plan  · Mild, likely due to poor oral intake and HCTZ use  · HCTZ now resumed  · Monitor     Chronic ischemic right MCA stroke  Assessment & Plan  · See plan above     Adjustment reaction with anxiety  Assessment & Plan  · Continue celexa   · Supportive cares    Bilateral carotid artery stenosis  Assessment & Plan  · Continue eliquis and brillinta  · Vascular surgery referral on dc    Nicotine dependence  Assessment & Plan  · Encourage cessation   · Added nicoderm patch     Primary hypertension  Assessment & Plan  · Per wife BP has been uncontrolled outpatient, compliance has not been ideal until she started managing his medications  · Continue with metoprolol 100mg daily, losartan 50mg daily, hydralazine 25mg TID and amlodipine 10mg daily, HCTZ 25mg   · BP stable, monitor routinely     Type 2 diabetes mellitus, without long-term current use of insulin (HCC)-resolved as of 4/27/2023  Assessment & Plan  Lab Results   Component Value Date    HGBA1C 5 8 (H) 04/27/2023       Recent Labs     05/02/23  1623 05/02/23  2115 05/03/23  0625 05/03/23  1131   POCGLU 127 141* 108 135       Blood Sugar Average: Last 72 hrs:  (P) 117 [id number]   · A1c 5 8  · Hold metformin while inpatient  · Diabetic diet  · Fingerstick QID with sliding scale coverage    Urinary retention-resolved as of 5/3/2023  Assessment & Plan  · Pt reporting difficulty with urinating   · Follow urine retention protocol and PVRs  · C/w flomax         VTE Pharmacologic Prophylaxis: VTE Score: 3 eliquis was dc today in anticipation of LP 5/4/2023    Patient Centered Rounds: I performed bedside rounds "with nursing staff today  Discussions with Specialists or Other Care Team Provider: neurology, cm    Education and Discussions with Family / Patient: Updated  (wife) at bedside  Total Time Spent on Date of Encounter in care of patient: 35 minutes This time was spent on one or more of the following: performing physical exam; counseling and coordination of care; obtaining or reviewing history; documenting in the medical record; reviewing/ordering tests, medications or procedures; communicating with other healthcare professionals and discussing with patient's family/caregivers  Current Length of Stay: 7 day(s)  Current Patient Status: Inpatient   Certification Statement: The patient will continue to require additional inpatient hospital stay due to neuro work up including angiogram maybe, LP 5/4/2023, MRI brain and maybe MRI L spine, pain management in the L spine and placement eventually  Discharge Plan: Anticipate discharge in >72 hrs to [facility] most likely    Code Status: Level 1 - Full Code    Subjective:   Continues with Lower back pain constant  He is bunched down in the bed, laying on his left side helps  No ha, vision changes, cp, sob  He is \"pissed\" because he doesn't feel himself and his aphasia is frustrating  Discussed with wife the back pain and it is concerning for her because it makes him uncomfortable  She asks for it to be treated while he is IP  States he has had back pain from work injuries and multiple surgeries for 40 years  It would flare up every now and then and he would get epidural shots for pain  Last time he was pain free was for 10 years   Now mid March 2023 it flared up again and PCP was treating it without avail  He cannot get epidural pain injection as OP without being able to get updates MRI L spine which was being considered in the OP setting but then things snow balled with the hospitalization for stroke and now readmission for new stroke   She describes it " seems from her perspective that this pain flare up is similar quality to the flare ups in the past     Objective:     Vitals:   Temp (24hrs), Av 3 °F (36 8 °C), Min:98 3 °F (36 8 °C), Max:98 3 °F (36 8 °C)    Temp:  [98 3 °F (36 8 °C)] 98 3 °F (36 8 °C)  HR:  [] 84  Resp:  [20] 20  BP: ()/(43-85) 125/74  SpO2:  [94 %-98 %] 95 %  Body mass index is 34 52 kg/m²  Input and Output Summary (last 24 hours): Intake/Output Summary (Last 24 hours) at 5/3/2023 1553  Last data filed at 5/3/2023 1049  Gross per 24 hour   Intake 360 ml   Output 200 ml   Net 160 ml       Physical Exam:   Physical Exam  Vitals and nursing note reviewed  Constitutional:       Appearance: He is obese  HENT:      Head: Normocephalic and atraumatic  Nose: Nose normal       Mouth/Throat:      Mouth: Mucous membranes are moist    Eyes:      Extraocular Movements: Extraocular movements intact  Conjunctiva/sclera: Conjunctivae normal       Pupils: Pupils are equal, round, and reactive to light  Cardiovascular:      Rate and Rhythm: Regular rhythm  Tachycardia present  Pulses: Normal pulses  Heart sounds: Murmur heard  Pulmonary:      Effort: Pulmonary effort is normal  No respiratory distress  Breath sounds: No stridor  No wheezing, rhonchi or rales  Chest:      Chest wall: No tenderness  Abdominal:      Palpations: Abdomen is soft  Musculoskeletal:      Cervical back: Neck supple  Comments: 5/5 muscle strength in the hips, no pronator drift    L spine I can see scars from prio surgeries and paraspinal muscle loss down in the Lumbar region but no ecchymosis, mass or deformity  Non tender with palpation along the spinous processes or paraspinal muscles  Skin:     General: Skin is warm and dry  Findings: Bruising present  Neurological:      Mental Status: He is alert        Comments: Aphasia, right neglect   Psychiatric:      Comments: Depressed          Additional Data: Labs:  Results from last 7 days   Lab Units 05/03/23  0553   WBC Thousand/uL 12 81*   HEMOGLOBIN g/dL 14 5   HEMATOCRIT % 41 8   PLATELETS Thousands/uL 328   NEUTROS PCT % 63   LYMPHS PCT % 21   MONOS PCT % 11   EOS PCT % 3     Results from last 7 days   Lab Units 05/03/23  0553   SODIUM mmol/L 134*   POTASSIUM mmol/L 3 9   CHLORIDE mmol/L 104   CO2 mmol/L 27   BUN mg/dL 37*   CREATININE mg/dL 1 20   ANION GAP mmol/L 3*   CALCIUM mg/dL 9 6   GLUCOSE RANDOM mg/dL 104         Results from last 7 days   Lab Units 05/03/23  1131 05/03/23  0625 05/02/23  2115 05/02/23  1623 05/02/23  1054 05/02/23  9942 05/01/23  2048 05/01/23  1559 05/01/23  1029 05/01/23  0602 04/30/23  2102 04/30/23  1624   POC GLUCOSE mg/dl 135 108 141* 127 115 94 125 104 116 108 128 130     Results from last 7 days   Lab Units 04/27/23  0517   HEMOGLOBIN A1C % 5 8*     Results from last 7 days   Lab Units 04/28/23  0522 04/27/23  0517   PROCALCITONIN ng/ml <0 05 <0 05       Lines/Drains:  Invasive Devices     Peripheral Intravenous Line  Duration           Peripheral IV 05/01/23 Dorsal (posterior); Right Hand 2 days                  Telemetry:  Telemetry Orders (From admission, onward)             48 Hour Telemetry Monitoring  Continuous x 48 hours        References:    Telemetry Guidelines   Question:  Reason for 48 Hour Telemetry  Answer:  Acute CVA (<24 hrs old, hemispheric strokes, selected brainstem strokes, cardiac arrhythmias)                 Telemetry Reviewed: Sinus Tachycardia  Indication for Continued Telemetry Use: Acute CVA             Imaging: Reviewed radiology reports from this admission including: carotids us    Recent Cultures (last 7 days): none        Last 24 Hours Medication List:   Current Facility-Administered Medications   Medication Dose Route Frequency Provider Last Rate   • acetaminophen  975 mg Oral Q8H [address] ERIC Og     • amLODIPine  10 mg Oral Daily Ethan Celestin MD     • aspirin  81 mg Oral Daily Etienne Rodríguez Fredy Guerra PA-C     • atorvastatin  40 mg Oral Daily Riccardo Ruby MD     • calcium carbonate  1,000 mg Oral Daily PRN Riccardo Ruby MD     • citalopram  20 mg Oral Daily Riccardo Ruby MD     • docusate sodium  100 mg Oral BID ERIC Dos Santos     • donepezil  5 mg Oral BID Riccardo Ruby MD     • hydrALAZINE  25 mg Oral TID Riccardo Ruby MD     • hydrochlorothiazide  25 mg Oral Daily ERIC Og     • HYDROmorphone  0 5 mg Intravenous Q4H PRN Jan Desai DO     • insulin lispro  1-6 Units Subcutaneous TID AC Riccardo Ruby MD     • insulin lispro  1-6 Units Subcutaneous HS Riccardo Ruby MD     • lidocaine  1 patch Topical Daily ERIC Og     • LORazepam  1 mg Intravenous Q6H PRN Jan Desai DO     • losartan  50 mg Oral Daily Riccardo Ruby MD     • methocarbamol  500 mg Oral Q6H [address] ERIC Dos Santos     • metoprolol succinate  100 mg Oral Daily Riccardo Ruby MD     • nicotine  14 mg Transdermal Daily ERIC Dos Santos     • ondansetron  4 mg Intravenous Q6H PRN Riccardo Ruby MD     • oxyCODONE  10 mg Oral Q4H PRN ERIC Dos Santos     • oxyCODONE  5 mg Oral Q4H PRN ERIC Dos Santos     • pantoprazole  20 mg Oral Early Morning Riccardo Ruby MD     • senna  2 tablet Oral Daily PRN Riccardo Ruby MD     • [START ON 5/4/2023] sodium chloride  75 mL/hr Intravenous Continuous Yan Harley PA-C     • tamsulosin  0 4 mg Oral Daily With Sommer Parham MD     • ticagrelor  90 mg Oral Q12H [facility]          Today, Patient Was Seen By: Alondra Cadet PA-C    **Please Note: This note may have been constructed using a voice recognition system  **

## 2023-05-03 NOTE — ASSESSMENT & PLAN NOTE
Presented to the ER as a stroke alert with history of known severe R M1 stenosis, prior tobacco use, recent L MCA stroke (residual aphasia) 04/16/2023 & came in as a Stroke alert on 4/26/2023  NIHSS of 2  Initial presenting deficits were L facial droop, R sided weakness, and AMS  As an inpatient patient developed even more neuro sxs including   R homonymous hemianopsia, Rightward leaning with ambulation    · Neurology following, appreciate recommendations   · IVY on 5 2 showed EF 60% with no PFO  · MRI in 5 2 showed left parietal stroke that appeared larger  · She underwent angiogram on 5/4 to assess for vasculitis, status post left ICA stent without signs of vasculitis  · Started on Brilinta 4/27/2023  · Continue statin and aspirin  · Neurology is following and recommending for replacement of loop recorder  · Discussed with patient's wife, likely he will need assistance with eating  · [facility] for rehab

## 2023-05-03 NOTE — PHYSICAL THERAPY NOTE
PHYSICAL THERAPY NOTE          Patient Name: Susan Tubbs  OCCDF'U Date: 5/3/2023       05/03/23 1049   PT Last Visit   PT Visit Date 05/03/23   Note Type   Note Type Treatment   Pain Assessment   Pain Assessment Tool 0-10   Pain Score No Pain   Restrictions/Precautions   Weight Bearing Precautions Per Order No   Other Precautions Cognitive; Chair Alarm; Bed Alarm;Multiple lines;Telemetry; Fall Risk  (R sided hemiparesis, expressive aphasia, ? apraxia, poor motor planning, ataxic, ? R neglect)   General   Chart Reviewed Yes   Response to Previous Treatment Patient with no complaints from previous session  Family/Caregiver Present No   Cognition   Overall Cognitive Status Impaired   Arousal/Participation Cooperative   Attention Difficulty attending to directions   Orientation Level Oriented to person;Oriented to place; Disoriented to time;Oriented to situation  (place with options)   Following Commands Follows one step commands with increased time or repetition   Comments poor attention  benefits from closed environment   Subjective   Subjective agreeable   Bed Mobility   Supine to Sit 5  Supervision   Additional items Assist x 1; Increased time required;Verbal cues   Sit to Supine 5  Supervision   Additional items Increased time required;Verbal cues   Transfers   Sit to Stand 3  Moderate assistance   Additional items Assist x 1; Increased time required;Verbal cues   Stand to Sit 4  Minimal assistance   Additional items Assist x 1; Increased time required;Verbal cues   Additional Comments c RW, VC for hand placement, x5 STS Throughout   Ambulation/Elevation   Gait pattern R Foot drag;Improper Weight shift;Decreased foot clearance; Short stride; Excessively slow; Ataxia; Shuffling   Gait Assistance 3  Moderate assist   Additional items Assist x 1;Verbal cues   Assistive Device Rolling walker   Distance 12'+12'+15'   Ambulation/Elevation Additional Comments trial with visual cues of tape on floor to improve step length and sequencing-poor direction following  Balance   Static Sitting Fair   Dynamic Sitting Fair -   Static Standing Poor +   Dynamic Standing Poor   Ambulatory Poor   Endurance Deficit   Endurance Deficit Yes   Endurance Deficit Description fatigue   Activity Tolerance   Activity Tolerance Patient limited by fatigue   Medical Staff Made Aware DPT Rupa-staff orientation   Nurse Made Aware yes-cleared to mobilize   Exercises   Neuro re-ed sitting & standing: fwd/bwd RLE and LLE (standing only) stepping on tape x10 reps each c minAx1 (standing)   Assessment   Prognosis Good   Problem List Decreased strength;Decreased endurance; Impaired balance;Decreased mobility; Decreased coordination;Decreased cognition; Impaired judgement;Decreased safety awareness   Assessment Pt agreeable to participate in PT session  Pt performed functional mobility and therex as outlined above  Pt with short shuffling, ataxic steps while ambulating  Trial of tape on floor for step length visual cues however pt unable to follow commands  Opted for part practice of stepping fwd/bwd on tape with single LE  Able to perform with LLE with increased cues however unable to follow directions despite all attempts with RLE  After switching to closed environment and performing in sitting, able to perform with increased cues and time in standing with RLE  Pt with expressive aphasia and ?apraxia of greater than 1 step commands as well as ataxia and poor motor planning  Pt left supine in bed with bed alarm, call bell, phone, and all personal needs within reach  Pt will continue to benefit from skilled acute care PT to further address their functional mobility limitations  D/C recommendations remain rehab     Barriers to Discharge Inaccessible home environment;Decreased caregiver support   Goals   Patient Goals to improve   STG Expiration Date 05/11/23   PT Treatment Day 3   Plan Treatment/Interventions Functional transfer training;LE strengthening/ROM; Therapeutic exercise;Elevations; Endurance training;Cognitive reorientation;Patient/family training;Equipment eval/education; Bed mobility;Gait training;Spoke to nursing;Spoke to case management;OT   Progress Slow progress, cognitive deficits   PT Frequency 3-5x/wk   Recommendation   PT Discharge Recommendation Post acute rehabilitation services   AM-PAC Basic Mobility Inpatient   Turning in Flat Bed Without Bedrails 3   Lying on Back to Sitting on Edge of Flat Bed Without Bedrails 3   Moving Bed to Chair 3   Standing Up From Chair Using Arms 2   Walk in Room 2   Climb 3-5 Stairs With Railing 2   Basic Mobility Inpatient Raw Score 15   Basic Mobility Standardized Score 36 97   Highest Level Of Mobility   -HLM Goal 4: Move to chair/commode   JH-HLM Achieved 7: Walk 25 feet or more   Dain Gomez, PT, DPT

## 2023-05-03 NOTE — ASSESSMENT & PLAN NOTE
Reports chronic lower back pain for 40 years with acute flare x 4 weeks which has not improved despite multiple trails of steroids, nsaids and muscle relaxants from the PCP  · XR with degenerative changes in L spine  · Pt was reporting worsening low back pain, right side weakness and difficulty urinating, anesthesia on the buttock/saddle (sitting on items in his chair and he did not notice it)  Defer MRI L spine for now, work up for stroke as above  Neuro dc the MRI L spine prefer stroke/vasculitis for etiology of sxs at this time  Can see how the work up as above goes and then re-order the MRI L spine later if the pain is still uncontrolled    · Schedule tylenol, lidocaine patch, add robaxin, prn oxy   · PMR evaluating for placement

## 2023-05-03 NOTE — ASSESSMENT & PLAN NOTE
· Post left ICA stent on 5/ 4  · Continue aspirin, statin and Brilinta  · Outpatient vascular surgery referral on dc

## 2023-05-04 ENCOUNTER — ANESTHESIA (INPATIENT)
Dept: RADIOLOGY | Facility: HOSPITAL | Age: 63
End: 2023-05-04

## 2023-05-04 ENCOUNTER — ANESTHESIA EVENT (INPATIENT)
Dept: RADIOLOGY | Facility: HOSPITAL | Age: 63
End: 2023-05-04

## 2023-05-04 ENCOUNTER — APPOINTMENT (INPATIENT)
Dept: RADIOLOGY | Facility: HOSPITAL | Age: 63
End: 2023-05-04
Attending: NEUROLOGICAL SURGERY

## 2023-05-04 LAB
DSDNA AB SER-ACNC: <1 IU/ML (ref 0–9)
GLUCOSE SERPL-MCNC: 103 MG/DL (ref 65–140)
GLUCOSE SERPL-MCNC: 108 MG/DL (ref 65–140)
GLUCOSE SERPL-MCNC: 122 MG/DL (ref 65–140)
GLUCOSE SERPL-MCNC: 129 MG/DL (ref 65–140)
KCT BLD-ACNC: 138 SEC (ref 89–137)
KCT BLD-ACNC: 220 SEC (ref 89–137)
KCT BLD-ACNC: 226 SEC (ref 89–137)
PLATELET # BLD AUTO: 322 THOUSANDS/UL (ref 149–390)
PMV BLD AUTO: 9.9 FL (ref 8.9–12.7)
SPECIMEN SOURCE: ABNORMAL

## 2023-05-04 PROCEDURE — B3151ZZ FLUOROSCOPY OF BILATERAL COMMON CAROTID ARTERIES USING LOW OSMOLAR CONTRAST: ICD-10-PCS | Performed by: INTERNAL MEDICINE

## 2023-05-04 PROCEDURE — B41F1ZZ FLUOROSCOPY OF RIGHT LOWER EXTREMITY ARTERIES USING LOW OSMOLAR CONTRAST: ICD-10-PCS | Performed by: INTERNAL MEDICINE

## 2023-05-04 PROCEDURE — 037L3DZ DILATION OF LEFT INTERNAL CAROTID ARTERY WITH INTRALUMINAL DEVICE, PERCUTANEOUS APPROACH: ICD-10-PCS | Performed by: INTERNAL MEDICINE

## 2023-05-04 RX ORDER — HYDROMORPHONE HCL/PF 1 MG/ML
0.5 SYRINGE (ML) INJECTION EVERY 4 HOURS PRN
Status: DISCONTINUED | OUTPATIENT
Start: 2023-05-04 | End: 2023-05-10

## 2023-05-04 RX ORDER — MIDAZOLAM HYDROCHLORIDE 2 MG/2ML
INJECTION, SOLUTION INTRAMUSCULAR; INTRAVENOUS AS NEEDED
Status: DISCONTINUED | OUTPATIENT
Start: 2023-05-04 | End: 2023-05-04

## 2023-05-04 RX ORDER — HYDROCODONE BITARTRATE AND ACETAMINOPHEN 5; 325 MG/1; MG/1
1 TABLET ORAL EVERY 6 HOURS PRN
Status: DISCONTINUED | OUTPATIENT
Start: 2023-05-04 | End: 2023-05-12 | Stop reason: HOSPADM

## 2023-05-04 RX ORDER — ONDANSETRON 2 MG/ML
4 INJECTION INTRAMUSCULAR; INTRAVENOUS ONCE AS NEEDED
Status: DISCONTINUED | OUTPATIENT
Start: 2023-05-04 | End: 2023-05-04

## 2023-05-04 RX ORDER — SODIUM CHLORIDE 9 MG/ML
100 INJECTION, SOLUTION INTRAVENOUS CONTINUOUS
Status: DISCONTINUED | OUTPATIENT
Start: 2023-05-04 | End: 2023-05-04

## 2023-05-04 RX ORDER — HYDROMORPHONE HCL/PF 1 MG/ML
SYRINGE (ML) INJECTION AS NEEDED
Status: DISCONTINUED | OUTPATIENT
Start: 2023-05-04 | End: 2023-05-04

## 2023-05-04 RX ORDER — HEPARIN SODIUM 5000 [USP'U]/ML
5000 INJECTION, SOLUTION INTRAVENOUS; SUBCUTANEOUS EVERY 8 HOURS SCHEDULED
Status: DISCONTINUED | OUTPATIENT
Start: 2023-05-04 | End: 2023-05-12 | Stop reason: HOSPADM

## 2023-05-04 RX ORDER — DIPHENHYDRAMINE HYDROCHLORIDE 50 MG/ML
12.5 INJECTION INTRAMUSCULAR; INTRAVENOUS ONCE AS NEEDED
Status: DISCONTINUED | OUTPATIENT
Start: 2023-05-04 | End: 2023-05-04 | Stop reason: HOSPADM

## 2023-05-04 RX ORDER — HYDROMORPHONE HCL/PF 1 MG/ML
0.5 SYRINGE (ML) INJECTION
Status: DISCONTINUED | OUTPATIENT
Start: 2023-05-04 | End: 2023-05-04 | Stop reason: HOSPADM

## 2023-05-04 RX ORDER — LIDOCAINE HYDROCHLORIDE 10 MG/ML
INJECTION, SOLUTION EPIDURAL; INFILTRATION; INTRACAUDAL; PERINEURAL AS NEEDED
Status: COMPLETED | OUTPATIENT
Start: 2023-05-04 | End: 2023-05-04

## 2023-05-04 RX ORDER — SODIUM CHLORIDE, SODIUM LACTATE, POTASSIUM CHLORIDE, CALCIUM CHLORIDE 600; 310; 30; 20 MG/100ML; MG/100ML; MG/100ML; MG/100ML
125 INJECTION, SOLUTION INTRAVENOUS CONTINUOUS
Status: DISCONTINUED | OUTPATIENT
Start: 2023-05-04 | End: 2023-05-04

## 2023-05-04 RX ORDER — SODIUM CHLORIDE 9 MG/ML
INJECTION, SOLUTION INTRAVENOUS CONTINUOUS PRN
Status: DISCONTINUED | OUTPATIENT
Start: 2023-05-04 | End: 2023-05-04

## 2023-05-04 RX ORDER — IODIXANOL 320 MG/ML
200 INJECTION, SOLUTION INTRAVASCULAR
Status: COMPLETED | OUTPATIENT
Start: 2023-05-04 | End: 2023-05-04

## 2023-05-04 RX ORDER — HEPARIN SODIUM 1000 [USP'U]/ML
INJECTION, SOLUTION INTRAVENOUS; SUBCUTANEOUS AS NEEDED
Status: DISCONTINUED | OUTPATIENT
Start: 2023-05-04 | End: 2023-05-04

## 2023-05-04 RX ORDER — LIDOCAINE HYDROCHLORIDE 10 MG/ML
INJECTION, SOLUTION EPIDURAL; INFILTRATION; INTRACAUDAL; PERINEURAL
Status: COMPLETED | OUTPATIENT
Start: 2023-05-04 | End: 2023-05-04

## 2023-05-04 RX ORDER — EPHEDRINE SULFATE 50 MG/ML
INJECTION INTRAVENOUS AS NEEDED
Status: DISCONTINUED | OUTPATIENT
Start: 2023-05-04 | End: 2023-05-04

## 2023-05-04 RX ORDER — HYDRALAZINE HYDROCHLORIDE 20 MG/ML
15 INJECTION INTRAMUSCULAR; INTRAVENOUS
Status: DISCONTINUED | OUTPATIENT
Start: 2023-05-04 | End: 2023-05-05

## 2023-05-04 RX ORDER — FENTANYL CITRATE/PF 50 MCG/ML
25 SYRINGE (ML) INJECTION
Status: DISCONTINUED | OUTPATIENT
Start: 2023-05-04 | End: 2023-05-04 | Stop reason: HOSPADM

## 2023-05-04 RX ORDER — ONDANSETRON 2 MG/ML
4 INJECTION INTRAMUSCULAR; INTRAVENOUS ONCE AS NEEDED
Status: DISCONTINUED | OUTPATIENT
Start: 2023-05-04 | End: 2023-05-04 | Stop reason: HOSPADM

## 2023-05-04 RX ORDER — FENTANYL CITRATE/PF 50 MCG/ML
50 SYRINGE (ML) INJECTION
Status: DISCONTINUED | OUTPATIENT
Start: 2023-05-04 | End: 2023-05-04 | Stop reason: HOSPADM

## 2023-05-04 RX ORDER — FENTANYL CITRATE 50 UG/ML
INJECTION, SOLUTION INTRAMUSCULAR; INTRAVENOUS AS NEEDED
Status: DISCONTINUED | OUTPATIENT
Start: 2023-05-04 | End: 2023-05-04

## 2023-05-04 RX ORDER — LABETALOL HYDROCHLORIDE 5 MG/ML
5 INJECTION, SOLUTION INTRAVENOUS
Status: DISCONTINUED | OUTPATIENT
Start: 2023-05-04 | End: 2023-05-05

## 2023-05-04 RX ADMIN — ACETAMINOPHEN 975 MG: 325 TABLET ORAL at 15:55

## 2023-05-04 RX ADMIN — ASPIRIN 81 MG: 81 TABLET, COATED ORAL at 11:39

## 2023-05-04 RX ADMIN — FENTANYL CITRATE 50 MCG: 50 INJECTION, SOLUTION INTRAMUSCULAR; INTRAVENOUS at 10:10

## 2023-05-04 RX ADMIN — FENTANYL CITRATE 50 MCG: 50 INJECTION, SOLUTION INTRAMUSCULAR; INTRAVENOUS at 08:16

## 2023-05-04 RX ADMIN — METHOCARBAMOL 500 MG: 500 TABLET ORAL at 05:44

## 2023-05-04 RX ADMIN — HYDROMORPHONE HYDROCHLORIDE 0.5 MG: 1 INJECTION, SOLUTION INTRAMUSCULAR; INTRAVENOUS; SUBCUTANEOUS at 08:03

## 2023-05-04 RX ADMIN — HYDRALAZINE HYDROCHLORIDE 25 MG: 25 TABLET, FILM COATED ORAL at 21:20

## 2023-05-04 RX ADMIN — LIDOCAINE HYDROCHLORIDE 10 ML: 10 INJECTION, SOLUTION EPIDURAL; INFILTRATION; INTRACAUDAL; PERINEURAL at 08:54

## 2023-05-04 RX ADMIN — PANTOPRAZOLE SODIUM 20 MG: 20 TABLET, DELAYED RELEASE ORAL at 05:44

## 2023-05-04 RX ADMIN — HEPARIN SODIUM 5000 UNITS: 1000 INJECTION INTRAVENOUS; SUBCUTANEOUS at 09:54

## 2023-05-04 RX ADMIN — SODIUM CHLORIDE: 9 INJECTION, SOLUTION INTRAVENOUS at 08:32

## 2023-05-04 RX ADMIN — EPHEDRINE SULFATE 10 MG: 50 INJECTION INTRAVENOUS at 10:21

## 2023-05-04 RX ADMIN — DONEPEZIL HYDROCHLORIDE 5 MG: 5 TABLET ORAL at 18:14

## 2023-05-04 RX ADMIN — IODIXANOL 150 ML: 320 INJECTION, SOLUTION INTRAVASCULAR at 10:35

## 2023-05-04 RX ADMIN — TICAGRELOR 90 MG: 90 TABLET ORAL at 11:31

## 2023-05-04 RX ADMIN — LABETALOL HYDROCHLORIDE 5 MG: 5 INJECTION, SOLUTION INTRAVENOUS at 23:16

## 2023-05-04 RX ADMIN — LIDOCAINE HYDROCHLORIDE 0.5 ML: 10 INJECTION, SOLUTION EPIDURAL; INFILTRATION; INTRACAUDAL; PERINEURAL at 08:30

## 2023-05-04 RX ADMIN — HEPARIN SODIUM 5000 UNITS: 5000 INJECTION INTRAVENOUS; SUBCUTANEOUS at 21:20

## 2023-05-04 RX ADMIN — MIDAZOLAM 1 MG: 1 INJECTION INTRAMUSCULAR; INTRAVENOUS at 10:06

## 2023-05-04 RX ADMIN — TAMSULOSIN HYDROCHLORIDE 0.4 MG: 0.4 CAPSULE ORAL at 15:55

## 2023-05-04 RX ADMIN — MIDAZOLAM 1 MG: 1 INJECTION INTRAMUSCULAR; INTRAVENOUS at 08:10

## 2023-05-04 RX ADMIN — HYDROMORPHONE HYDROCHLORIDE 0.5 MG: 1 INJECTION, SOLUTION INTRAMUSCULAR; INTRAVENOUS; SUBCUTANEOUS at 09:35

## 2023-05-04 RX ADMIN — FENTANYL CITRATE 25 MCG: 50 INJECTION INTRAMUSCULAR; INTRAVENOUS at 13:58

## 2023-05-04 RX ADMIN — FENTANYL CITRATE 50 MCG: 50 INJECTION, SOLUTION INTRAMUSCULAR; INTRAVENOUS at 10:05

## 2023-05-04 RX ADMIN — SODIUM CHLORIDE: 0.9 INJECTION, SOLUTION INTRAVENOUS at 08:08

## 2023-05-04 RX ADMIN — DOCUSATE SODIUM 100 MG: 100 CAPSULE, LIQUID FILLED ORAL at 18:14

## 2023-05-04 RX ADMIN — EPHEDRINE SULFATE 5 MG: 50 INJECTION INTRAVENOUS at 10:12

## 2023-05-04 RX ADMIN — HYDRALAZINE HYDROCHLORIDE 25 MG: 25 TABLET, FILM COATED ORAL at 15:55

## 2023-05-04 RX ADMIN — FENTANYL CITRATE 50 MCG: 50 INJECTION, SOLUTION INTRAMUSCULAR; INTRAVENOUS at 08:06

## 2023-05-04 RX ADMIN — TICAGRELOR 90 MG: 90 TABLET ORAL at 21:20

## 2023-05-04 RX ADMIN — ACETAMINOPHEN 975 MG: 325 TABLET ORAL at 05:44

## 2023-05-04 RX ADMIN — METHOCARBAMOL 500 MG: 500 TABLET ORAL at 23:16

## 2023-05-04 RX ADMIN — METHOCARBAMOL 500 MG: 500 TABLET ORAL at 18:14

## 2023-05-04 RX ADMIN — HYDROMORPHONE HYDROCHLORIDE 0.5 MG: 1 INJECTION, SOLUTION INTRAMUSCULAR; INTRAVENOUS; SUBCUTANEOUS at 10:18

## 2023-05-04 RX ADMIN — ACETAMINOPHEN 975 MG: 325 TABLET ORAL at 21:20

## 2023-05-04 NOTE — ANESTHESIA POSTPROCEDURE EVALUATION
Post-Op Assessment Note    CV Status:  Stable  Pain Score: 3    Pain management: adequate     Mental Status:  Awake   Hydration Status:  Stable   PONV Controlled:  None   Airway Patency:  Patent      Post Op Vitals Reviewed: Yes      Staff: Anesthesiologist, CRNA         No notable events documented      BP   138/76   Temp   97 2   Pulse  118   Resp   12   SpO2   95

## 2023-05-04 NOTE — ANESTHESIA PROCEDURE NOTES
"Arterial Line Insertion  Performed by: Darlin Krishnan CRNA  Authorized by: Clarisse Mcgill MD   Consent: Written consent obtained  Consent given by: spouse  Patient understanding: patient states understanding of the procedure being performed  Patient consent: the patient's understanding of the procedure matches consent given  Procedure consent: procedure consent matches procedure scheduled  Relevant documents: relevant documents present and verified  Test results: test results available and properly labeled  Required items: required blood products, implants, devices, and special equipment available  Patient identity confirmed: arm band, provided demographic data, hospital-assigned identification number and verbally with patient  Time out: Immediately prior to procedure a \"time out\" was called to verify the correct patient, procedure, equipment, support staff and site/side marked as required  Preparation: Patient was prepped and draped in the usual sterile fashion    Indications: hemodynamic monitoring  Indications comments: SURGEON REQUEST, ACT'S  Orientation:  Left  Location: radial arterylidocaine (PF) (XYLOCAINE-MPF) 1 % - Infiltration   0 5 mL - 5/4/2023 8:30:00 AM  Procedure Details:  Ariel's test normal: yes  Needle gauge: 20  Number of attempts: 2    Post-procedure:  Post-procedure: dressing applied  Waveform: good waveform and waveform confirmed  Post-procedure CNS: normal  Patient tolerance: Patient tolerated the procedure well with no immediate complications  Comments: US GUIDED, MAVIS        "

## 2023-05-04 NOTE — PROGRESS NOTES
NEUROLOGY RESIDENCY PROGRESS NOTE     Name: Giovanni Patiño   Age & Sex: 58 y o  male   MRN: [id number]  Unit/Bed#: ICU 08   Encounter: [id number]    Giovanni Patiño will need follow up in in 6 weeks with neurovascular attending  He will not require outpatient neurological testing, at this time  ASSESSMENT & PLAN     * Stroke [facility])  Assessment & Plan  58year old male with HTN, HLD, DM2, prior R MCA CVA s/p thrombectomy in March 2019 w/ no etiology found despite years of loop recorder (and was maintained on Plavix 75mg after that), known severe R M1 stenosis, prior tobacco use, recent L MCA stroke (had aphasia and was empirically AC eliquis 5mg BID w/ ASA 81mg at that time due to ESUSx2) 04/16/2023 (was on came in as a Stroke alert on 4/26/2023  3:30 PM with initial NIHSS of 2 (for LOC questions) and LKW 2:30pm, initial Blood Pressure: 154/71  Initial presenting deficits were L facial droop, R sided weakness, and AMS but on actual exam confused/altered unable to give correct month and correct age    As a result of past stroke within the past month and on eliquis pt was determined to not be a candidate for thrombolysis (TNK)  Has had previous Loop recorder that was negative for any arrhythmias  While here patient had noted new multifocal strokes  Switched from ASA 81mg to Brilinta 90mg BID  prior work-up including IVY (no PFO) and loop recorder without evidence of Afib  CT of chest abdomen and pelvis with no evidence of malignancy 04/17/2023  Thrombosis panel done in 04/18/2023 that was only abnl for AT3 that was low in the acute setting  Previous TTE 04/18/23 showed ED of 55% and nl wall motion and mildly dilated L atrium       Exam on 05/01/23: appears aphasic unable to give correct month and correct age and expresses frustration, more frontal aphasia, paraphasic errors, R homonymous hemianopia, R leaning gait and small steppage   Current Blood Pressure: 137/71, BP over 24 hours: BP  Min: 110/89  Max: 137/71   Vascular risk factors: past L MCA and R MCA stroke, DM, HTN, HLD  Home meds: eliquis 5mg BID, ASA 81mg     Workup:  Lab Results   Component Value Date    HGBA1C 5 8 (H) 04/27/2023    CHOLESTEROL 109 04/27/2023    LDLCALC 40 04/27/2023    TRIG 166 (H) 04/27/2023    INR 1 15 04/26/2023      CTH: No acute intracranial abnormality  Chronic microangiopathy  Small evolving subacute infarct in the left parietal periventricular white matter  Other small recent infarcts better visualized on previous MRI  CTA: Stable moderate (60 to 65%) stenosis in the bilateral internal carotid arteries  Stable severe stenosis distal right M1 segment  Stable severe stenosis proximal left M2  MRI: Compared to the prior recent MRI examination dated 4/17/2023, interval increase in size of diffusion abnormality in the left parietal subcortical and periventricular white matter consistent with acute to subacute ischemia  New small focus of gyral diffusion abnormality in the right frontal parietal region (4:20)  Persistent additional small foci of diffusion abnormality in the left periatrial and parieto-occipital region  No acute hemorrhage or midline shift  Stable moderate chronic microangiopathic changes within the brain  Echocardiogram: EF of 55% and nl wall motion and mildly dilated L atrium  IVY in 2019 nl and no soruce of cardiac embolism; LEFT ATRIUM: Size was normal  No thrombus was identified  APPENDAGE: The appendage was small  No thrombus was identified  DOPPLER: The function was mildly reduced (mildly reduced emptying velocity)  Telemetry: negative  Repeat Brain MRI: appears more evolution of past strokes moreso in L posterior parietal area, some noted  TTE: ejection fraction is 55-60%  Systolic function is normal  Wall motion cannot be accurately assessed  lipomatous hypertrophy of the interatrial septum  No PFO  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  Left Atrial Appendage:  There is reduced function  There is no thrombus  Carotid Dopplers: STEVE <50% stenosis noted in the internal carotid artery  Plaque isheterogenous and irregular  LICA 47-13% stenosis noted in the internal carotid artery  Plaque is heterogenous and irregular  Angiogram: Successful left carotid artery stenting below the level of C1  No evidence of intracranial vasculitis  There is evidence of intracranial atherosclerotic disease  Flow cyto: no abnormalities in blast in lymphoid or myeloid  D-Dimer: 1 13 (elevated)  ESR: 29  CRP: 7 9      Pertinent scores:  - NIHSS: 2  Stroke Modified Aurea Score: 0 (No baseline symptoms/disability)    Impression: gerstmann syndrome (finger agnosia, acalculia, agraphia, left-right disorientation)   New multiple strokes difficult although suspicious central/cardioembolic cause give dilated L atrium as well as noted  Severe atherosclerosis in L and R MCA w/ R parietal strokes w/ noted irregular L and R ICA plaques (50-60%) s/p L ICA stent     Plan:  - Discussed plan with neurology attending, Dr Cristiane Dhillon  - Antiplatelet agents: continue BRILINTA 90mg BID and ASA 81mg for noted L ICA stent   - as per neurosurgery  - off eliquis  - in regards to R ICA, would recommend outpatient f/u w/ either vascular surgery   - recommend cardiology consult for loop recorder placement   - Given noted past history of prior cognitive impairment and previous MOCA of 18/30 in past neurology outpatient visits, would recommend outpatient neurocognitive evaluation  - BP: allow for normotension  - atorvastatin 40mg qhs  - Maintain glucose <180, SSI for coverage if indicated  - Medical management as per primary team appreciated  - continue w/ correction of any toxic/metabolic abnormalities  - DVT ppx and SCDs  - Monitor on telemetry  - PT/OT/Speech/PM&R input recommended rehab   - patient would likely benefit from speech therapy outpatient  - Stroke education  - rest of care as per primary team  - No other further recommendations from the inpatient Neurology perspective  - Please contact Neurology any further questions  SUBJECTIVE     Patient was seen and examined  No acute overnight events  Patient no complaints  He denies any headaches, syncope, paresthesias, diplopia, visual changes, tinnitus, sudden onset weakness, garbled speech, dysarthria/slurring of words,  Loss of bowel or bladder      Review of Systems   Unable to perform ROS: Other     Patient is aphasic so unreliable historian     OBJECTIVE     Patient ID: Christ Livingston is a 58 y o  male  Vitals:    23 0500 23 0600 23 0700 23 0800   BP: 105/54 115/53 140/64 152/91   BP Location: Right arm Right arm     Pulse: 100 92 90 (!) 106   Resp: (!) 24 17 22 21   Temp:       TempSrc:       SpO2: 95% 94% 95% 95%   Weight:       Height:          Temperature:   Temp (24hrs), Av 4 °F (36 9 °C), Min:97 8 °F (36 6 °C), Max:99 °F (37 2 °C)    Temperature: 98 2 °F (36 8 °C)      Physical Exam     Neurologic Exam     Gait, Coordination, and Reflexes     Gait  Gait: wide-based         Physical Exam  Vitals and nursing note reviewed  Constitutional:       General: He is not in acute distress  Appearance: He is well-developed  HENT:      Head: Normocephalic and atraumatic  Eyes:      Extraocular Movements: EOM normal       Conjunctiva/sclera: Conjunctivae normal       Pupils: Pupils are equal, round, and reactive to light  Cardiovascular:      Rate and Rhythm: Normal rate  Pulmonary:      Effort: Pulmonary effort is normal    Abdominal:      Palpations: Abdomen is soft  Tenderness: There is no abdominal tenderness  Musculoskeletal:         General: No swelling  Cervical back: Neck supple  Skin:     General: Skin is warm and dry  Capillary Refill: Capillary refill takes less than 2 seconds  Neurological:      Mental Status: He is alert and oriented to person, place, and time        Motor: Motor strength is normal       Coordination: Finger-Nose-Finger Test and Heel to Lorie Hearing Test normal       Deep Tendon Reflexes:      Reflex Scores:       Tricep reflexes are 2+ on the right side and 2+ on the left side  Bicep reflexes are 2+ on the right side and 2+ on the left side  Brachioradialis reflexes are 2+ on the right side and 2+ on the left side  Patellar reflexes are 2+ on the right side and 2+ on the left side  Achilles reflexes are 2+ on the right side and 2+ on the left side  Psychiatric:         Mood and Affect: Mood normal          Speech: Speech normal          Neurologic Exam      Mental Status   Oriented to person, place,    Disoriented to time and season  Speech: speech is normal   Knowledge: good  Able to name object  Able to repeat   paraphasic errors and perseveration and was frustrated     Name items and parts of items   Had difficulty w/ 2-step commands   Has right-left disorientation   Has noted finger agnosia  Acalculia  Patient was able to repeat without any issues    Cranial Nerves      CN II   BTT hard to elicit,   Possible R sided homonymous hemianopia    CN III, IV, VI   Pupils are equal, round, and reactive to light    Extraocular motions are normal       CN V   Facial sensation intact       CN VII   Facial expression full, symmetric       CN VIII   CN VIII normal       CN IX, X   CN IX normal    CN X normal       CN XI   CN XI normal       CN XII   CN XII normal       Motor Exam   Overall muscle tone: normal     Strength   Strength 5/5 throughout except   4+/5 in R hip flexor     Sensory Exam   Light touch normal    Pinprick normal       Gait, Coordination, and Reflexes      Coordination   Finger to nose coordination: Patient was able to do finger-nose but had very to much difficulty following the problems particular with his left side    Heel to shin coordination: normal     Reflexes   Right brachioradialis: 2+  Left brachioradialis: 2+  Right biceps: 2+  Left biceps: 2+  Right triceps: 2+  Left triceps: 2+  Right patellar: 2+  Left patellar: 2+  Right achilles: 2+  Left achilles: 2+  Right plantar: normal  Left plantar: normal  Right ankle clonus: absent  Left ankle clonus: absent        LABORATORY DATA     Labs: I have personally reviewed pertinent reports  and I have personally reviewed pertinent films in PACS  Results from last 7 days   Lab Units 05/05/23  0540 05/04/23  1428 05/03/23  0553 05/02/23  0524   WBC Thousand/uL 12 18*  --  12 81* 11 98*   HEMOGLOBIN g/dL 12 7  --  14 5 14 9   HEMATOCRIT % 36 6  --  41 8 43 3   PLATELETS Thousands/uL 281 322 328 290   NEUTROS PCT %  --   --  63  --    MONOS PCT %  --   --  11  --       Results from last 7 days   Lab Units 05/05/23  0540 05/03/23  0553 05/02/23  0524   SODIUM mmol/L 134* 134* 134*   POTASSIUM mmol/L 3 3* 3 9 3 3*   CHLORIDE mmol/L 104 104 102   CO2 mmol/L 24 27 26   BUN mg/dL 21 37* 41*   CREATININE mg/dL 0 84 1 20 1 20   CALCIUM mg/dL 9 0 9 6 9 8     Results from last 7 days   Lab Units 05/05/23  0540   MAGNESIUM mg/dL 1 7          Results from last 7 days   Lab Units 05/05/23  0540   INR  1 03   PTT seconds 30               IMAGING & DIAGNOSTIC TESTING     Radiology Results: I have personally reviewed pertinent reports  and I have personally reviewed pertinent films in PACS    MRI brain w contrast   Final Result by Andreas Lucas MD (05/03 1700)      Motion degraded exam limiting sensitivity  No abnormal enhancement  Workstation performed: [id number]         MRI brain wo contrast   Final Result by Kathryn Felty, MD (05/03 1422)      1  Incomplete exam since patient was not able to finish the study  Axial diffusion and T2 sequences and sagittal T1 sequence were obtained  2   Worsened recent infarcts with new areas of acute ischemia in similar distribution involving left parietotemporal and occipital periventricular and deep white matter, and to lesser degree cortices/subcortical white matter  Associated edema with mild    regional mass effect  There is no T1 hyperintense hemorrhage  Cannot evaluate for petechial hemorrhage (gradient susceptibility sequence not obtained)   3  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  The study was marked in [facility] for immediate notification  Workstation performed: [id number]         VAS carotid complete study   Final Result by Amy Perez MD (05/02 2028)      VAS lower limb venous duplex study, complete bilateral   Final Result by Jose Rafael Norwood MD (05/02 1449)      MRI follow up neuro   Final Result by Marly Flanagan MD (05/02 1215)      XR chest pa & lateral   Final Result by Kayden Thayer MD (04/28 1019)      No acute cardiopulmonary disease  Workstation performed: [id number]         MRI brain wo contrast   Final Result by Martha Matthews MD (04/27 0437)      Compared to the prior recent MRI examination dated 4/17/2023, interval increase in size of diffusion abnormality in the left parietal subcortical and periventricular white matter consistent with acute to subacute ischemia  New small focus of gyral diffusion abnormality in the right frontal parietal region (4:20)  Persistent additional small foci of diffusion abnormality in the left periatrial and parieto-occipital region  No acute hemorrhage or midline shift  Stable moderate chronic microangiopathic changes within the brain  Study was marked in [facility] for immediate notification  Workstation performed: [id number]         CTA stroke alert (head/neck)   Final Result by E Marybelle Leventhal, MD (04/26 1611)   Stable moderate (60 to 65%) stenosis in the bilateral internal carotid arteries  Stable severe stenosis distal right M1 segment  Stable severe stenosis proximal left M2  No new intracranial stenosis or large vessel occlusion  Findings were directly discussed with Madie Burnham at 3:42 p m  Workstation performed: [id number]         CT stroke alert brain   Final Result by SUSAN Lopez MD (04/26 1556)      No acute intracranial abnormality  Chronic microangiopathy  Small evolving subacute infarct in the left parietal periventricular white matter  Other small recent infarcts better visualized on previous MRI  Findings were directly discussed with Madie Burnhma at 3:42 p m  Workstation performed: [id number]         IR cerebral angiography    (Results Pending)   XR chest portable ICU    (Results Pending)   VAS carotid complete study    (Results Pending)       Other Diagnostic Testing: I have personally reviewed pertinent reports     and I have personally reviewed pertinent films in PACS    ACTIVE MEDICATIONS     Current Facility-Administered Medications   Medication Dose Route Frequency   • acetaminophen (TYLENOL) tablet 975 mg  975 mg Oral Q8H [address]   • amLODIPine (NORVASC) tablet 10 mg  10 mg Oral Daily   • aspirin (ECOTRIN LOW STRENGTH) EC tablet 81 mg  81 mg Oral Daily   • atorvastatin (LIPITOR) tablet 40 mg  40 mg Oral Daily   • calcium carbonate (TUMS) chewable tablet 1,000 mg  1,000 mg Oral Daily PRN   • citalopram (CeleXA) tablet 20 mg  20 mg Oral Daily   • docusate sodium (COLACE) capsule 100 mg  100 mg Oral BID   • donepezil (ARICEPT) tablet 5 mg  5 mg Oral BID   • heparin (porcine) subcutaneous injection 5,000 Units  5,000 Units Subcutaneous Q8H [address]   • labetalol (NORMODYNE) injection 5 mg  5 mg Intravenous Q15 Min PRN    And   • hydrALAZINE (APRESOLINE) injection 15 mg  15 mg Intravenous Q15 Min PRN    And   • niCARdipine (CARDENE) 25 mg (STANDARD CONCENTRATION) in sodium chloride 0 9% 250 mL  1-15 mg/hr Intravenous Continuous PRN   • hydrALAZINE (APRESOLINE) tablet 25 mg  25 mg Oral TID   • hydrochlorothiazide (HYDRODIURIL) tablet 25 mg  25 mg Oral Daily   • HYDROcodone-acetaminophen (NORCO) 5-325 mg per tablet 1 tablet  1 tablet Oral Q6H PRN   • HYDROmorphone (DILAUDID) injection 0 5 mg  0 5 mg Intravenous Q4H PRN   • insulin lispro (HumaLOG) 100 units/mL subcutaneous injection 1-6 Units  1-6 Units Subcutaneous TID AC   • insulin lispro (HumaLOG) 100 units/mL subcutaneous injection 1-6 Units  1-6 Units Subcutaneous HS   • lidocaine (LIDODERM) 5 % patch 1 patch  1 patch Topical Daily   • LORazepam (ATIVAN) injection 1 mg  1 mg Intravenous Q6H PRN   • losartan (COZAAR) tablet 50 mg  50 mg Oral Daily   • magnesium Oxide (MAG-OX) tablet 400 mg  400 mg Oral BID   • methocarbamol (ROBAXIN) tablet 500 mg  500 mg Oral Q6H JOSE   • metoprolol succinate (TOPROL-XL) 24 hr tablet 100 mg  100 mg Oral Daily   • nicotine (NICODERM CQ) 14 mg/24hr TD 24 hr patch 14 mg  14 mg Transdermal Daily   • ondansetron (ZOFRAN) injection 4 mg  4 mg Intravenous Q6H PRN   • oxyCODONE (ROXICODONE) immediate release tablet 10 mg  10 mg Oral Q4H PRN   • oxyCODONE (ROXICODONE) IR tablet 5 mg  5 mg Oral Q4H PRN   • pantoprazole (PROTONIX) EC tablet 20 mg  20 mg Oral Early Morning   • phenylephrine (SHARRON-SYNEPHRINE) 50 mg (STANDARD CONCENTRATION) in sodium chloride 0 9% 250 mL   mcg/min Intravenous Continuous PRN   • polyethylene glycol (MIRALAX) packet 17 g  17 g Oral QAM   • senna (SENOKOT) tablet 17 2 mg  2 tablet Oral Daily PRN   • tamsulosin (FLOMAX) capsule 0 4 mg  0 4 mg Oral Daily With Dinner   • ticagrelor (BRILINTA) tablet 90 mg  90 mg Oral Q12H [facility]       Prior to Admission medications    Medication Sig Start Date End Date Taking?  Authorizing Provider   amLODIPine (NORVASC) 10 mg tablet Take 10 mg by mouth daily   Yes Historical Provider, MD   apixaban (Eliquis) 5 mg Take 1 tablet (5 mg total) by mouth 2 (two) times a day 4/18/23  Yes Aminah WHYTE PA-C   aspirin (ECOTRIN LOW STRENGTH) 81 mg EC tablet Take 1 tablet (81 mg total) by mouth daily Do not start before April 19, 2023 4/19/23  Yes Aminah Marlow PA-C atorvastatin (LIPITOR) 40 mg tablet Take 40 mg by mouth daily with breakfast   Yes Historical Provider, MD   citalopram (CeleXA) 20 mg tablet Take 20 mg by mouth daily   Yes Historical Provider, MD   donepezil (ARICEPT) 5 mg tablet TAKE 1 TABLET TWICE A DAY 2/7/23  Yes Jessica Zelaya MD   hydrALAZINE (APRESOLINE) 25 mg tablet Take 25 mg by mouth 3 (three) times a day   Yes Historical Provider, MD   hydrochlorothiazide (HYDRODIURIL) 25 mg tablet Take 25 mg by mouth daily   Yes Historical Provider, MD   lidocaine (Lidoderm) 5 % Apply 1 patch topically over 12 hours daily Remove & Discard patch within 12 hours or as directed by MD 4/26/23  Yes Loly Wagoner DO   losartan (COZAAR) 50 mg tablet Take 50 mg by mouth daily   Yes Historical Provider, MD   metFORMIN (GLUCOPHAGE) 500 mg tablet Take 1 tablet (500 mg total) by mouth daily with breakfast 4/6/19  Yes Marcel Owens MD   metoprolol succinate (TOPROL-XL) 100 mg 24 hr tablet Take 100 mg by mouth daily   Yes Historical Provider, MD   omeprazole (PriLOSEC OTC) 20 MG tablet Take 20 mg by mouth daily   Yes Historical Provider, MD   sildenafil (REVATIO) 20 mg tablet TAKE 2 3 TABLETS BY MOUTH AS NEEDED FOR SEXUAL ACTIVITY 7/12/19  Yes Historical Provider, MD   tamsulosin (FLOMAX) 0 4 mg Take 0 4 mg by mouth daily with breakfast   Yes Historical Provider, MD         VTE Pharmacologic Prophylaxis: as per primary  VTE Mechanical Prophylaxis: sequential compression device    ==  Jan Myers 27  Neurology Residency, PGY-2

## 2023-05-04 NOTE — ANESTHESIA PREPROCEDURE EVALUATION
Procedure:  IR CEREBRAL ANGIOGRAPHY    TTE (04/2023):  Left Ventricle Left ventricular cavity size is normal  Wall thickness is mildly increased  The left ventricular ejection fraction is 55%  Systolic function is normal   Wall motion is normal  Diastolic function is mildly abnormal, consistent with grade I (abnormal) relaxation  Right Ventricle Right ventricular cavity size is normal  Systolic function is normal  Wall thickness is normal    Left Atrium The atrium is mildly dilated  Right Atrium The atrium is mildly dilated  Aortic Valve The aortic valve is trileaflet  The leaflets are not thickened  The leaflets are mildly calcified  There is mildly reduced mobility  There is no evidence of regurgitation  There is mild stenosis  Mitral Valve Mitral valve structure is normal  There is mild regurgitation  There is no evidence of stenosis  Tricuspid Valve Tricuspid valve structure is normal  There is mild regurgitation  There is no evidence of stenosis  Pulmonic Valve Pulmonic valve structure is normal  There is no evidence of regurgitation  There is no evidence of stenosis  Ascending Aorta The aortic root is normal in size  IVC/SVC The inferior vena cava is normal in size  Pericardium There is no pericardial effusion  The pericardium is normal in appearance  EKG (04/2023);   Sinus rhythm with occasional Premature ventricular complexes  T wave abnormality, consider inferior ischemia  Abnormal ECG  When compared with ECG of 16-APR-2023 20:41,  Premature ventricular complexes are now Present  T wave inversion now evident in Inferior leads  Nonspecific T wave abnormality now evident in Anterolateral leads    Relevant Problems   CARDIO   (+) Hypertriglyceridemia   (+) Primary hypertension      GI/HEPATIC   (+) GERD (gastroesophageal reflux disease)      MUSCULOSKELETAL   (+) Chronic low back pain      NEURO/PSYCH   (+) Chronic low back pain   (+) Headache   (+) 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 ([facility] [address] )   (+) History of stroke   (+) Stroke ([facility] [place] [facility] )          * Stroke [facility])  Assessment & Plan  Presented to the ER as a stroke alert with history of known severe R M1 stenosis, prior tobacco use, recent L MCA stroke (residual aphasia) 04/16/2023 & came in as a Stroke alert on 4/26/2023  NIHSS of 2  Initial presenting deficits were L facial droop, R sided weakness, and AMS  As an inpatient patient developed even more neuro sxs including   R homonymous hemianopsia, Rightward leaning with ambulation  [facility] Melend[facility] following, appreciate recommendations   ? Started on Brilinta 4/27/2023  ? Continue statin  ? There is concern for vasculitis  - Holding eliquis in anticipation of LP on either 5/4 or 5/5  - Ophthalmology consult for fundoscopic exam  - Cerebral angiogram 5/4/2023  ? There is noted evidence for NPH on MRI brain from 5/2/2023     SIRS (systemic inflammatory response syndrome) ([facility] [place] [facility] )  Assessment & Plan   Noted on admission with WBC 12K, tachycardia   Infectious workup unremarkable    Procal levels negative    Remains stable off of antibiotic    Maybe from pain in the L spine vs reactive leukocytosis from stroke      Chronic low back pain  Assessment & Plan  Reports chronic lower back pain for 40 years with acute flare x 4 weeks which has not improved despite multiple trails of steroids, nsaids and muscle relaxants from the PCP   XR with degenerative changes in L spine   Pt was reporting worsening low back pain, right side weakness and difficulty urinating, decreased sensation on the buttock/saddle (sitting on items in his chair and he did not notice it)  Defer MRI L spine for now, work up for stroke as above  Neuro dc the MRI L spine prefer stroke/vasculitis for etiology of sxs at this time  Can see how the work up as above goes and then re-order the MRI L spine later if the pain is still uncontrolled     Schedule tylenol, lidocaine patch, add robaxin, prn oxy    PMR evaluating for placement     Prediabetes  Assessment & Plan   A1c 5 8   Currently holding metformin while hospitalized, resume on discharge    Carb controlled diet      Hyponatremia  Assessment & Plan   Mild, likely due to poor oral intake and HCTZ use   HCTZ now resumed   Monitor      Chronic ischemic right MCA stroke  Assessment & Plan   See plan above      Adjustment reaction with anxiety  Assessment & Plan   Continue celexa    Supportive cares     Bilateral carotid artery stenosis  Assessment & Plan   Continue eliquis and ortiz   Vascular surgery referral on dc     Nicotine dependence  Assessment & Plan   Encourage cessation    Added nicoderm patch      Primary hypertension  Assessment & Plan   Per wife BP has been uncontrolled outpatient, compliance has not been ideal until she started managing his medications   Continue with metoprolol 100mg daily, losartan 50mg daily, hydralazine 25mg TID and amlodipine 10mg daily, HCTZ 25mg    BP stable, monitor routinely      Type 2 diabetes mellitus, without long-term current use of insulin (HCC)-resolved as of 4/27/2023  Assessment & Plan        Lab Results   Component Value Date     HGBA1C 5 8 (H) 04/27/2023                Recent Labs     05/02/23  1623 05/02/23  2115 05/03/23  0625 05/03/23  1131   POCGLU 127 141* 108 135         Blood Sugar Average: Last 72 hrs:  (P) 117 [id number]    A1c 5 8   Hold metformin while inpatient   Diabetic diet   Fingerstick QID with sliding scale coverage     Urinary retention-resolved as of 5/3/2023  Assessment & Plan   Pt reporting difficulty with urinating    Follow urine retention protocol and PVRs   C/w flomax                  Physical Exam    Airway    Mallampati score: II  TM Distance: >3 FB  Neck ROM: full     Dental   No notable dental hx     Cardiovascular  Rhythm: regular, Rate: normal,     Pulmonary  Breath sounds clear to auscultation,     Other Findings  Intercisor Distance > 3cm          Anesthesia Plan  ASA Score- 4     Anesthesia Type- IV sedation with anesthesia with ASA Monitors  Additional Monitors: arterial line  Airway Plan:     Comment: NPO appropriate  Discussed benefits/risks of monitored anesthetic care which involves providing a dynamic level of mild to deep sedation  Complications include awareness and/or airway obstruction/aspiration which may necessitate conversion to general anesthesia  All questions answered  Patient understands and wishes to proceed          Plan Factors-Exercise tolerance (METS): <4 METS  Chart reviewed  EKG reviewed  Imaging results reviewed  Existing labs reviewed  Patient summary reviewed  Patient is not a current smoker  Induction- intravenous  Postoperative Plan- Plan for postoperative opioid use  Informed Consent- Anesthetic plan and risks discussed with patient  I personally reviewed this patient with the CRNA  Discussed and agreed on the Anesthesia Plan with the CRNA  Erica Waldron

## 2023-05-04 NOTE — SEDATION DOCUMENTATION
Cerebral angiogram completed by Dr Kena Mitchell  Tolerated well  Transported to PACU, report at bedside

## 2023-05-04 NOTE — PLAN OF CARE
Problem: PAIN - ADULT  Goal: Verbalizes/displays adequate comfort level or baseline comfort level  Description: Interventions:  - Encourage patient to monitor pain and request assistance  - Assess pain using appropriate pain scale  - Administer analgesics based on type and severity of pain and evaluate response  - Implement non-pharmacological measures as appropriate and evaluate response  - Consider cultural and social influences on pain and pain management  - Notify physician/advanced practitioner if interventions unsuccessful or patient reports new pain  Outcome: Progressing     Problem: SAFETY ADULT  Goal: Patient will remain free of falls  Description: INTERVENTIONS:  - Educate patient/family on patient safety including physical limitations  - Instruct patient to call for assistance with activity   - Consult OT/PT to assist with strengthening/mobility   - Keep Call bell within reach  - Keep bed low and locked with side rails adjusted as appropriate  - Keep care items and personal belongings within reach  - Initiate and maintain comfort rounds  - Make Fall Risk Sign visible to staff  - Offer Toileting every 2 Hours, in advance of need  - Initiate/Maintain bed alarm  - Apply yellow socks and bracelet for high fall risk patients  - Consider moving patient to room near nurses station  Outcome: Progressing     Problem: INFECTION - ADULT  Goal: Absence or prevention of progression during hospitalization  Description: INTERVENTIONS:  - Assess and monitor for signs and symptoms of infection  - Monitor lab/diagnostic results  - Monitor all insertion sites, i e  indwelling lines, tubes, and drains  - Monitor endotracheal if appropriate and nasal secretions for changes in amount and color  - [place] appropriate cooling/warming therapies per order  - Administer medications as ordered  - Instruct and encourage patient and family to use good hand hygiene technique  - Identify and instruct in appropriate isolation precautions for identified infection/condition  Outcome: Progressing

## 2023-05-04 NOTE — PLAN OF CARE
Problem: PAIN - ADULT  Goal: Verbalizes/displays adequate comfort level or baseline comfort level  Description: Interventions:  - Encourage patient to monitor pain and request assistance  - Assess pain using appropriate pain scale  - Administer analgesics based on type and severity of pain and evaluate response  - Implement non-pharmacological measures as appropriate and evaluate response  - Consider cultural and social influences on pain and pain management  - Notify physician/advanced practitioner if interventions unsuccessful or patient reports new pain  Outcome: Progressing     Problem: INFECTION - ADULT  Goal: Absence or prevention of progression during hospitalization  Description: INTERVENTIONS:  - Assess and monitor for signs and symptoms of infection  - Monitor lab/diagnostic results  - Monitor all insertion sites, i e  indwelling lines, tubes, and drains  - Monitor endotracheal if appropriate and nasal secretions for changes in amount and color  - [place] appropriate cooling/warming therapies per order  - Administer medications as ordered  - Instruct and encourage patient and family to use good hand hygiene technique  - Identify and instruct in appropriate isolation precautions for identified infection/condition  Outcome: Progressing     Problem: SAFETY ADULT  Goal: Patient will remain free of falls  Description: INTERVENTIONS:  - Educate patient/family on patient safety including physical limitations  - Instruct patient to call for assistance with activity   - Consult OT/PT to assist with strengthening/mobility   - Keep Call bell within reach  - Keep bed low and locked with side rails adjusted as appropriate  - Keep care items and personal belongings within reach  - Initiate and maintain comfort rounds  - Make Fall Risk Sign visible to staff  - Offer Toileting every 2 Hours, in advance of need  - Initiate/Maintain bed alarm  - Apply yellow socks and bracelet for high fall risk patients  - Consider moving patient to room near nurses station  Outcome: Progressing     Problem: DISCHARGE PLANNING  Goal: Discharge to home or other facility with appropriate resources  Description: INTERVENTIONS:  - Identify barriers to discharge w/patient and caregiver  - Arrange for needed discharge resources and transportation as appropriate  - Identify discharge learning needs (meds, wound care, etc )  - Arrange for interpretive services to assist at discharge as needed  - Refer to Case Management Department for coordinating discharge planning if the patient needs post-hospital services based on physician/advanced practitioner order or complex needs related to functional status, cognitive ability, or social support system  Outcome: Progressing     Problem: Knowledge Deficit  Goal: Patient/family/caregiver demonstrates understanding of disease process, treatment plan, medications, and discharge instructions  Description: Complete learning assessment and assess knowledge base  Interventions:  - Provide teaching at level of understanding  - Provide teaching via preferred learning methods  Outcome: Progressing     Problem: NEUROSENSORY - ADULT  Goal: Achieves stable or improved neurological status  Description: INTERVENTIONS  - Monitor and report changes in neurological status  - Monitor vital signs such as temperature, blood pressure, glucose, and any other labs ordered   - Initiate measures to prevent increased intracranial pressure  - Monitor for seizure activity and implement precautions if appropriate      Outcome: Progressing  Goal: Achieves maximal functionality and self care  Description: INTERVENTIONS  - Monitor swallowing and airway patency with patient fatigue and changes in neurological status  - Encourage and assist patient to increase activity and self care     - Encourage visually impaired, hearing impaired and aphasic patients to use assistive/communication devices  Outcome: Progressing     Problem: CARDIOVASCULAR - ADULT  Goal: Maintains optimal cardiac output and hemodynamic stability  Description: INTERVENTIONS:  - Monitor I/O, vital signs and rhythm  - Monitor for S/S and trends of decreased cardiac output  - Administer and titrate ordered vasoactive medications to optimize hemodynamic stability  - Assess quality of pulses, skin color and temperature  - Assess for signs of decreased coronary artery perfusion  - Instruct patient to report change in severity of symptoms  Outcome: Progressing  Goal: Absence of cardiac dysrhythmias or at baseline rhythm  Description: INTERVENTIONS:  - Continuous cardiac monitoring, vital signs, obtain 12 lead EKG if ordered  - Administer antiarrhythmic and heart rate control medications as ordered  - Monitor electrolytes and administer replacement therapy as ordered  Outcome: Progressing     Problem: METABOLIC, FLUID AND ELECTROLYTES - ADULT  Goal: Electrolytes maintained within normal limits  Description: INTERVENTIONS:  - Monitor labs and assess patient for signs and symptoms of electrolyte imbalances  - Administer electrolyte replacement as ordered  - Monitor response to electrolyte replacements, including repeat lab results as appropriate  - Instruct patient on fluid and nutrition as appropriate  Outcome: Progressing  Goal: Glucose maintained within target range  Description: INTERVENTIONS:  - Monitor Blood Glucose as ordered  - Assess for signs and symptoms of hyperglycemia and hypoglycemia  - Administer ordered medications to maintain glucose within target range  - Assess nutritional intake and initiate nutrition service referral as needed  Outcome: Progressing     Problem: MOBILITY - ADULT  Goal: Maintain or return to baseline ADL function  Description: INTERVENTIONS:  -  Assess patient's ability to carry out ADLs; assess patient's baseline for ADL function and identify physical deficits which impact ability to perform ADLs (bathing, care of mouth/teeth, toileting, grooming, dressing, etc )  - Assess/evaluate cause of self-care deficits   - Assess range of motion  - Assess patient's mobility; develop plan if impaired  - Assess patient's need for assistive devices and provide as appropriate  - Encourage maximum independence but intervene and supervise when necessary  - Involve family in performance of ADLs  - Assess for home care needs following discharge   - Consider OT consult to assist with ADL evaluation and planning for discharge  - Provide patient education as appropriate  Outcome: Progressing  Goal: Maintains/Returns to pre admission functional level  Description: INTERVENTIONS:  - Perform BMAT or MOVE assessment daily    - Set and communicate daily mobility goal to care team and patient/family/caregiver  - Collaborate with rehabilitation services on mobility goals if consulted  - Perform Range of Motion 2 times a day  - Reposition patient every 2 hours    - Dangle patient 2 times a day  - Stand patient 2 times a day  - Ambulate patient 2 times a day  - Out of bed to chair 2 times a day   - Out of bed for meals 2 times a day  - Out of bed for toileting  - Record patient progress and toleration of activity level   Outcome: Progressing     Problem: Prexisting or High Potential for Compromised Skin Integrity  Goal: Skin integrity is maintained or improved  Description: INTERVENTIONS:  - Identify patients at risk for skin breakdown  - Assess and monitor skin integrity  - Assess and monitor nutrition and hydration status  - Monitor labs   - Assess for incontinence   - Turn and reposition patient  - Assist with mobility/ambulation  - Relieve pressure over bony prominences  - Avoid friction and shearing  - Provide appropriate hygiene as needed including keeping skin clean and dry  - Evaluate need for skin moisturizer/barrier cream  - Collaborate with interdisciplinary team   - Patient/family teaching  - Consider wound care consult   Outcome: Progressing

## 2023-05-04 NOTE — DISCHARGE INSTRUCTIONS
Today, you underwent a diagnostic cerebral angiogram under the care of Dr Ramon Irwin for evaluation of stroke  ? The following instructions will help you care for yourself, or be cared for upon your return home today  These are guidelines for your care right after your surgery only  ? Notify Your Doctor or Nurse if you have any of the following:  ? SYMPTOMS OF WOUND INFECTION--   Increased pain in or around the incision   Swelling around the incision  Any drainage from the incision  Incision separates or opens up  Warmth in the tissues around the incision  Redness or tenderness on the skin near the incision   Fever (temperature greater than 101 degrees F)   ? NEUROLOGICAL CHANGES--  Change in alertness  Increased sleepiness   Nausea and vomiting   New onset of numbness or weakness in arms or legs   New problems with your bowels or bladder  New or worse problems with balance or walking  Seizures, new or worsening  ? UNRELIEVED HEADACHE PAIN--  New or increased pain unrelieved with pain medications   Pain associated with nausea and vomiting   Pain associated with other symptoms  ? QUESTIONS OR PROBLEMS--  Any questions or problems that you are unsure about  Wound Care:  Keep Incision Clean and Dry   You may shower daily, but do not soak incision  Pat dry after showering  No tub baths, soaking, swimming for 1 week after angiogram    You do not need to cover the incision  Mild to moderate bruising and tenderness to the site is expected and may last up to 1-2 weeks after your procedure  ?  A closure device was placed at the catheter insertion site  This is MRI compatible  Remove the dressing 24 hours after your procedure  If your groin site is bleeding, apply firm pressure for 10 minutes  Reinforce dressing rather than removing and checking frequently  If continues to bleed through the dressing after 1 hour, contact your neurosurgeon's office  Anticipatory Education:  ?   PAIN MED W/ Acetaminophen (Tylenol)  --IF a prescription for pain medicine has been sent home with you:  --Narcotic pain medication may cause constipation  Be sure to take stool softeners or laxatives while you are on narcotic pain medication  --Do not drive after taking prescription pain medicine  ?  If this medicine is too strong, or no longer necessary, or we did NOT recommend/prescribe oral narcotics, you may take:   - Tylenol Extra-strength/Acetaminophen, 2 tablets every 4-6 hours as needed for mild pain  DO NOT TAKE MORE THAN 4000MG PER DAY from combined sources  NOTE: Remember to eat when taking pain medicines in order to avoid nausea  Watch for constipation  Eat plenty of fruits, vegetables, juices, and drink 6-8 glasses of water each day  Constipation: Stay active and drink at least 6-8 cups of fluid each day to prevent constipation  If you need a laxative or stool softener follow the package directions or consult with your local pharmacists if you have questions  ? After anesthesia, rest for 24 hours  Do not drive, drink alcohol beverages or make any important decisions during this time  General anesthesia may cause sore throat, jaw discomfort or muscle aches  These symptoms can last for one or two days  Activity: Please follow these instructions:  Advance your activity as you can tolerate  You may do light house work; nothing strenuous   You may walk all you want  You may go up and down the steps  Use the railing for support  Do not do excessive bending, straining or heavy lifting for 48 hours after your procedure  Do not drive or return to work until you are instructed   It is normal for your energy level and sleep patterns to change after surgery  Get extra sleep at night and take naps during the day to help you feel less tired  Take rest periods during the day  Complete recovery may take several weeks  ?  You may resume driving after 29-58 hours recovery  You may return to work after 48 hours of recovery  ?  Diet:  Your doctor has recommended that you follow these diet instructions at home  Refer to the patient education materials you received during your hospital stay  If you would like more nutrition counseling, ask your doctor about making an appointment with an outpatient dietitian  Resume your home diet  ? Medications:  Please resume your home medications as instructed  ? Home Supplies and Equipment:  none  Additional Contacts:  ? CONTACTS FOR NEUROSURGERY: You may call your neurosurgeon’s office if you have questions between 8:30 am and 4:30 pm  You may request to speak to the nurse practitioner who is available Monday through Friday  ?  For off hours or the weekend you may call your neurosurgeon's office to leave a message

## 2023-05-04 NOTE — CASE MANAGEMENT
Case Management Discharge Planning Note    Patient name Lou Grayson  Location [address] 7 OVR/PPHP 7 OVR MRN [id number]  : 1960 Date 2023       Current Admission Date: 2023  Current Admission Diagnosis:Stroke [facility])   Patient Active Problem List    Diagnosis Date Noted   • Prediabetes 2023   • SIRS (systemic inflammatory response syndrome) ([facility] [address] ) 2023   • Chronic anticoagulation 2023   • Stroke ([facility] [address] ) 2023   • Hyponatremia 2023   • Middle cerebral artery stenosis, right 2023   • Left posterior MCA stroke - etiology unclear at this time 2023   • Chronic low back pain 2023   • Hypertensive encephalopathy, transient 2023   • Snoring 08/10/2020   • Memory difficulties 08/10/2020   • Chronic ischemic right MCA stroke 2019   • Status post placement of implantable loop recorder 2019   • Adjustment reaction with anxiety 2019   • Insomnia 2019   • Fall 2019   • 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 ([facility] [address] ) 2019   • Hypertriglyceridemia 2019   • Nicotine dependence 2019   • Bilateral carotid artery stenosis 2019   • Presbyopia 2019   • History of stroke 2019   • Headache 2019   • Primary hypertension 2019   • GERD (gastroesophageal reflux disease) 2019      LOS (days): 8  Geometric Mean LOS (GMLOS) (days): 2 90  Days to GMLOS:-4 9     OBJECTIVE:  Risk of Unplanned Readmission Score: 20 07         Current admission status: Inpatient   Preferred Pharmacy:   [facility] [address] [place] [address] [address]  [address] [place] [address]  Phone: [phone] Fax: [facility] [place] [address]  Phone: [phone] Fax: [phone]    Primary Care Provider: ERIC Johnson    Primary Insurance: [facility]  Secondary Insurance: Jennifer CROSS    DISCHARGE DETAILS:           Additional Comments: CM conferred with [facility] provider and is aware the pt continues to receive acute care intervention  Neurology is monitoring/providing medical management for noted evolving stroke  Disposition plan remains to transition the pt to [facility] once medical clearance is determined  Updated insurance authorization from [facility] [employer] will be needed to facilitate IRF admission  CM will continue to follow

## 2023-05-04 NOTE — PROGRESS NOTES
Daily Progress Note - Critical Care   Adriano Braun 58 y o  male MRN: [id number]  Unit/Bed#: ICU 08 Encounter: [id number]        ----------------------------------------------------------------------------------------  HPI: 58year old male with uncontrolled HTN, HLD, prediabetes, bilateral carotid artery stenosis, prior tobacco use, previous R MCA CVA s/p thrombectomy in March '19 without clear etiology despite years of loop recorder (maintained on Plavix 75mg after that), recent L MCA stroke 4/16/23 (empirically AC eliquis 5mg BID w/ ASA 81mg  due to concern for ESUS)  He presents 4/26/23 with initial NIHSS of 2 (for LOC questions)  Initial presenting deficits included L facial droop, R sided weakness, and AMS  Patient did not receive TNK given recent stroke and on Eliquis  Loop recorder negative for arrhythmias  While here  he was found to have new multifocal strokes  IVY 5/2 shows EF 60% with no PFO  MRI 5/2 showed left parietal stroke that appears larger  He underwent angiogram to assess for vasculitis, and he is now status post left ICA stent 5/4 without signs of vasculitis      ---------------------------------------------------------------------------------------  Assessment and Plan:    1  Multifocal ischemic strokes with progressively enlarging left parietal lobe stroke  2  Bilateral cervical carotid atherosclerosis  3  Hypertension  4  Leukocytosis  5  Hyponatremia  6  Prediabetes  7   Chronic low back pain    Neuro:   • Diagnosis: Multifocal ischemic strokes with progressively enlarging left parietal lobe stroke  o Now s/p left ICA stent 5/1 -no signs of vasculitis  o Plan:   - S/p left ICA stent 5/4  - Continue aspirin and Brilinta  - Continue statin  - Neurochecks Q1H  - Goal BP normotension  - Monitor on telemetry  - PT/OT/speech  - Neurology consult  - Neurosurgery consult  • Diagnosis: Bilateral carotid artery stenosis  o Plan:   - S/p left ICA stent 5/4  - Continue aspirin and Brilinta  - Continue statin      CV:   • Diagnosis: Hypertension  o Reportedly has been uncontrolled outpatient with poor compliance  o Does not appear to have had complete secondary hypertension work-up  o Plan:   - Norvasc 10 mg daily  - Hydralazine 25 mg 3 times daily   - HCTZ 25 mg daily  - Losartan 50 mg daily  - Toprol 100 mg daily      Pulm:  • Diagnosis: No acute issues  • Maintain SPO2 >92%        GI:   • Diagnosis: No acute issues    :   • Diagnosis: No acute issues  • Voiding independently  • Monitor urine output      F/E/N:   • Hyponatremia stable at this time  • Trend and replete electrolytes as needed  • Carb controlled diet      Heme/Onc:   • Hemoglobin and platelets stable  • DVT prophylaxis with heparin      Endo:   • Diagnosis: Prediabetes  o A1c 5 8  o On glycemic regimen includes metformin  o Plan:   - Sliding scale insulin as needed  - Continue blood glucose 140-180      ID:   • Diagnosis: SIRS  o Noted on admission with mild leukocytosis and tachycardia  o Infectious work-up negative to date  o Possibly secondary to pain versus reactive leukocytosis from stroke  o Plan:   - Monitor off antibiotics  - Trend fever curve, WBC count      MSK/Skin:   • Diagnosis: Chronic low back pain  o Plan:   - Analgesia:   • Scheduled Tylenol  • Robaxin  • Norco every 6 hours as needed mild pain  • Oxycodone 5/10 every 4 hours as needed mod/severe pain  • Dilaudid 0 5 mg IV every 4 hours as needed BT pain    Disposition: Admit to Critical Care   Code Status: Level 1 - Full Code  ---------------------------------------------------------------------------------------  ICU CORE MEASURES    Prophylaxis   VTE Pharmacologic Prophylaxis: Heparin  VTE Mechanical Prophylaxis: sequential compression device  Stress Ulcer Prophylaxis: Prophylaxis Not Indicated     ABCDE Protocol (if indicated)  Plan to perform spontaneous awakening trial today? Not applicable  Plan to perform spontaneous breathing trial today?  Not applicable  Obvious barriers to extubation? Not applicable  CAM-ICU: Negative    Invasive Devices Review  Invasive Devices     Peripheral Intravenous Line  Duration           Peripheral IV 23 Dorsal (posterior); Right Hand 3 days    Peripheral IV 23 Left Antecubital <1 day          Arterial Line  Duration           Arterial Line 23 Left Radial <1 day              Can any invasive devices be discontinued today? Not applicable  ---------------------------------------------------------------------------------------  OBJECTIVE    Vitals   Vitals:    23 1400 23 1421 23 1500 23 1600   BP:  156/71 135/73 144/89   BP Location:  Left arm     Pulse: (!) 112 (!) 109 (!) 110 (!) 118   Resp: 14 20 18 15   Temp:  97 8 °F (36 6 °C)     TempSrc:  Oral     SpO2: 97% 95% 96%    Weight:       Height:         Temp (24hrs), Av °F (36 7 °C), Min:97 2 °F (36 2 °C), Max:98 3 °F (36 8 °C)  Current: Temperature: 97 8 °F (36 6 °C)    Respiratory:       Invasive/non-invasive ventilation settings   Respiratory    Lab Data (Last 4 hours)    None         O2/Vent Data (Last 4 hours)    None                Physical Exam:  General: Appears comfortable in bed  Eyes: EOMI  Anicteric  Respiratory: Clear to auscultation  Symmetric thorax expansion  Cardiovascular: Regular rate and rhythm  Extremities appear well-perfused  Abdomen: Soft, nontender, non-distended  Extremities:  Moves extremities spontaneously  Skin: No obvious rashes or lesions  Neuro: Expressive and receptive aphasia  GCS 15   5/5 strength throughout  Sensation intact      Laboratory and Diagnostics:  Results from last 7 days   Lab Units 23  1428 23  0553 23  0524 23  0617 23  0522   WBC Thousand/uL  --  12 81* 11 98* 11 96* 11 59*   HEMOGLOBIN g/dL  --  14 5 14 9 15 6 15 9   HEMATOCRIT %  --  41 8 43 3 46 1 46 2   PLATELETS Thousands/uL 322 328 290 292 294   NEUTROS PCT %  --  63  --   --   --    MONOS PCT % " --  11  --   --   --      Results from last 7 days   Lab Units 05/03/23  0553 05/02/23  0524 05/01/23  0617 04/29/23  0510 04/28/23  1112   SODIUM mmol/L 134* 134* 132* 133* 133*   POTASSIUM mmol/L 3 9 3 3* 4 7 3 4* 3 9   CHLORIDE mmol/L 104 102 101 101 101   CO2 mmol/L 27 26 27 28 28   ANION GAP mmol/L 3* 6 4 4 4   BUN mg/dL 37* 41* 36* 29* 24   CREATININE mg/dL 1 20 1 20 1 19 1 10 1 08   CALCIUM mg/dL 9 6 9 8 9 8 9 5 9 7   GLUCOSE RANDOM mg/dL 104 98 112 106 105                       ABG:    VBG:    Results from last 7 days   Lab Units 04/28/23  0522   PROCALCITONIN ng/ml <0 05       Micro        EKG: Reviewed  Imaging: Reviewed    Intake and Output  I/O       05/02 0701 05/03 0700 05/03 0701 05/04 0700 05/04 0701 05/05 0700    P  O   360     I V  (mL/kg) 700 (6 8)  550 (5 3)    Total Intake(mL/kg) 700 (6 8) 360 (3 5) 550 (5 3)    Urine (mL/kg/hr) 600 (0 2) 200 (0 1)     Total Output 600 200     Net +100 +160 +550           Unmeasured Urine Occurrence  2 x           Height and Weights   Height: 5' 8\" (172 7 cm)  IBW (Ideal Body Weight): 68 4 kg  Body mass index is 34 52 kg/m²  Weight (last 2 days)     Date/Time Weight    05/04/23 1330 --    Comment rows:    OBSERV: Pt 's wife at his bedside  at 05/04/23 1330    05/02/23 1436 103 (227)    05/02/23 1415 103 (227)          Nutrition       Diet Orders   (From admission, onward)             Start     Ordered    05/04/23 1516  Diet Sanchez/CHO Controlled; Consistent Carbohydrate Diet Level 3 (6 carb servings/90 grams CHO/meal)  Diet effective now        References:    Nutrtion Support Algorithm Enteral vs  Parenteral   Question Answer Comment   Diet Type Sanchez/CHO Controlled    Sanchez/CHO Controlled Consistent Carbohydrate Diet Level 3 (6 carb servings/90 grams CHO/meal)    RD to adjust diet per protocol?  Yes        05/04/23 1515                Active Medications  Scheduled Meds:  Current Facility-Administered Medications   Medication Dose Route Frequency Provider Last Rate " • acetaminophen  975 mg Oral Q8H Bibi Meza MD     • amLODIPine  10 mg Oral Daily Babar Tinsley MD     • aspirin  81 mg Oral Daily Babar Tinsley MD     • atorvastatin  40 mg Oral Daily Babar Tinsley MD     • calcium carbonate  1,000 mg Oral Daily PRN Babar Tinsley MD     • citalopram  20 mg Oral Daily Babar Tinsley MD     • docusate sodium  100 mg Oral BID Babar Tinsley MD     • donepezil  5 mg Oral BID Babar Tinsley MD     • heparin (porcine)  5,000 Units Subcutaneous Q8H Bibi Meza MD     • labetalol  5 mg Intravenous Q15 Min PRN Babar Tinsley MD      And   • hydrALAZINE  15 mg Intravenous Q15 Min PRN Babar Tinsley MD      And   • niCARdipine  1-15 mg/hr Intravenous Continuous PRN Babar Tinsley MD     • hydrALAZINE  25 mg Oral TID Babar Tinsley MD     • hydrochlorothiazide  25 mg Oral Daily Babar Tinsley MD     • HYDROcodone-acetaminophen  1 tablet Oral Q6H PRN Babar Tinsley MD     • HYDROmorphone  0 5 mg Intravenous Q4H PRN Babar Tinsley MD     • insulin lispro  1-6 Units Subcutaneous TID AC Babar Tinsley MD     • insulin lispro  1-6 Units Subcutaneous HS Babar Tinsley MD     • lactated ringers  125 mL/hr Intravenous Continuous Babar Tinsley MD     • lidocaine  1 patch Topical Daily Babar Tinsley MD     • LORazepam  1 mg Intravenous Q6H PRN Babar Tinsley MD     • losartan  50 mg Oral Daily Babar Tinsley MD     • methocarbamol  500 mg Oral Q6H Bibi Meza MD     • metoprolol succinate  100 mg Oral Daily Babar Tinsley MD     • nicotine  14 mg Transdermal Daily Babar Tisnley MD     • ondansetron  4 mg Intravenous Q6H PRN Babar Tinsley MD     • oxyCODONE  10 mg Oral Q4H PRN David Vo MD     • oxyCODONE  5 mg Oral Q4H PRN Babar Tinsley MD     • pantoprazole  20 mg Oral Early Morning Babar Tinsley MD     • sodium chloride  500 mL Intravenous Once PRN Babar Tinsley MD      Followed by   • [START ON 5/5/2023] phenylephine   mcg/min Intravenous Continuous PRN "Cintia Walker MD     • senna  2 tablet Oral Daily PRN Cintia Walker MD     • sodium chloride  75 mL/hr Intravenous Continuous Cintia Walker MD 75 mL/hr (05/03/23 2337)   • sodium chloride  100 mL/hr Intravenous Continuous Rasta Gomez CRNA Stopped (05/04/23 1259)   • tamsulosin  0 4 mg Oral Daily With Ky Banks MD     • ticagrelor  90 mg Oral Q12H Rafaela Pizarro MD       Continuous Infusions:  lactated ringers, 125 mL/hr  niCARdipine, 1-15 mg/hr  [START ON 5/5/2023] phenylephine,  mcg/min  sodium chloride, 75 mL/hr, Last Rate: 75 mL/hr (05/03/23 2337)  sodium chloride, 100 mL/hr, Last Rate: Stopped (05/04/23 1259)      PRN Meds:   calcium carbonate, 1,000 mg, Daily PRN  labetalol, 5 mg, Q15 Min PRN   And  hydrALAZINE, 15 mg, Q15 Min PRN   And  niCARdipine, 1-15 mg/hr, Continuous PRN  HYDROcodone-acetaminophen, 1 tablet, Q6H PRN  HYDROmorphone, 0 5 mg, Q4H PRN  LORazepam, 1 mg, Q6H PRN  ondansetron, 4 mg, Q6H PRN  oxyCODONE, 10 mg, Q4H PRN  oxyCODONE, 5 mg, Q4H PRN  sodium chloride, 500 mL, Once PRN   Followed by  [START ON 5/5/2023] phenylephine,  mcg/min, Continuous PRN  senna, 2 tablet, Daily PRN        Allergies   Allergies   Allergen Reactions   • Flexeril [Cyclobenzaprine] Drowsiness   • Lisinopril Cough   • Nsaids Confusion     ---------------------------------------------------------------------------------------  Advance Directive and Living Will:      Power of :    POLST:    ---------------------------------------------------------------------------------------  Care Time Delivered:   60 minutes    Lily Delcid,       Portions of the record may have been created with voice recognition software  Occasional wrong word or \"sound a like\" substitutions may have occurred due to the inherent limitations of voice recognition software    Read the chart carefully and recognize, using context, where substitutions have occurred   "

## 2023-05-04 NOTE — OP NOTE
OPERATIVE REPORT  PATIENT NAME: Adriano Braun     :  1960  MRN: [id number]   Pt Location: Interventional radiology    SURGERY DATE: 23     Preop Diagnosis:  1  Left internal carotid artery stenosis  2  Left MCA stroke    Postop Diagnosis  1  Left internal carotid artery stenosis  2  Left MCA stroke    Procedure:  Right Common Carotid Arteriogram  Left Common Carotid Arteriogram  Left ICA angioplasty and stenting with distal protection device  Intraprocedural and postprocedural arteriography  Limited Right Femoral Arteriogram    Surgeon:   Aj Solano MD    Specimen(s):  None    Estimated Blood Loss:   None    Drains:  None    Anesthesia Type:   Monitored Anesthesia Care     Complications:  None    Operative Indications:  Adriano Braun  is a very pleasant 58 y o  male  who suffered from a left MCA stroke  After discussing the risks and benefits of a diagnostic cerebral arteriogram including bleeding, stroke, groin hematoma, and death the patient elected to proceed  Operative details:  After obtaining written informed consent, the patient was brought into the operating room and moved to the OR table in supine fashion  The groin was prepped and draped in the usual sterile fashion  Monitored anesthesia care was induced  10 cc of intradermal lidocaine was infused into the right femoral area and percutaneous access with a 5-Mauritian micropuncture kit was obtained into the right femoral artery  A 6 Mauritian shuttle sheath was placed  A baseline ACT was obtained and the patient was heparinized with 5000 units of intravenous heparin  ACTs were checked regularly throughout the case to obtain a level 1 5-2 times greater than the baseline  A 5-Mauritian angled Berenstein catheter was then advanced into the aorta, and over the aortic arch over a Glidewire  The right common carotid artery was then catheterized  AP lateral and oblique images of the right cervical carotid were obtained       AP lateral and magnified oblique images of the right intracranial carotid circulation were obtained  The catheter was then advanced into the left common carotid artery  AP lateral and oblique images of the left cervical carotid were obtained  AP lateral and magnified oblique images of the left intracranial carotid circulation were obtained  The shuttle sheath was then advanced into the mid cervical common carotid artery  A 5mm spider wire distal protection device was then advanced into the cervical carotid over a Traxcess microwire  The area of stenosis was crossed and a 6 mm spider distal protection device was deployed in the distal right internal carotid artery  Intraprocedural arteriogram and physical exam was then performed  Next a 5 0 mm x 30 mm monorail vicki balloon was advanced over spider wire and area of stenosis  This was then inflated to nominal pressure  The patient experienced bradycardia without significant hypotension that was treated by Anesthesia  The balloon was then deflated and removed  Intraprocedural arteriography and physical exam was then performed  Next an 7 mm Exact stent was advanced into the internal carotid artery along the area of most significant stenosis and deployed under constant fluoroscopy  Intraprocedural arteriography and physical exam were then performed  The distal protection device was then removed in intraprocedural arteriography of the left common carotid artery was performed as well as the intracranial circulation  The sheath was then withdrawn and a limited right femoral arteriogram was run  Puncture site was found to be compatible with a Mynx Closure device and this was done successfully  There was appropriate hemostasis  The patient was then awoken from monitored anesthesia care and found to be in baseline neurologic condition  All sponge and needle counts were correct  INTERPRETATION OF ANGIOGRAPHIC FINDINGS:   1   The Right common carotid circulation reveals antegrade flow into the internal and external carotid arteries  There is a very short segment of stenosis measuring 60% aside from this there is approximately 60% stenosis of the internal carotid artery immediately distal to the bifurcation  There is ulcerated and irregular plaque  Intracranially there is normal transit time with filling into the middle cerebral and anterior cerebral artery  There is evidence of M1 stenosis measuring 50%  There is  no evidence of vasculitis  2  The Left common carotid circulation reveals antegrade flow into the internal and external carotid arteries  There is a short segment stenosis approximately 67% stenosis at the left ICA bifurcation  Intracranially, there is antegrade flow into the middle cerebral and anterior cerebral arteries  The superior division of the M2 up has approximately 80% stenosis consistent with atherosclerotic disease  There is no evidence of intracranial vasculitis  The capillary and venous phases are otherwise unremarkable     3  Intraprocedural arteriography reveals placement of the distal protection device into the distal left internal carotid artery  Post angioplasty arteriography reveals improvement of flow along the bifurcation without evidence of thrombus/embolus in the distal protection device  Post stent deployment reveals significant improvement degree of stenosis along the internal carotid artery origin/common carotid/ICA bifurcation  4  Postprocedural arteriography reveals antegrade flow without significant hemodynamic stenosis along the carotid bifurcation  Additionally there is antegrade flow into the anterior cerebral and middle cerebral arteries  There is no evidence of embolism or intracranial thrombosis  Capillary and venous phases are unremarkable  5  Limited Right femoral arteriogram reveals normal puncture site anatomy  Impression:  1   Successful left carotid artery stenting below the level of C1   2   No evidence of intracranial vasculitis  There is evidence of intracranial atherosclerotic disease      Patient Disposition:  PACU     SIGNATURE: Aj Solano MD  DATE: 05/04/23   TIME: 12:28 PM

## 2023-05-05 ENCOUNTER — TELEPHONE (OUTPATIENT)
Dept: NEUROSURGERY | Facility: CLINIC | Age: 63
End: 2023-05-05

## 2023-05-05 ENCOUNTER — APPOINTMENT (INPATIENT)
Dept: RADIOLOGY | Facility: HOSPITAL | Age: 63
End: 2023-05-05

## 2023-05-05 PROBLEM — R00.0 TACHYCARDIA: Status: ACTIVE | Noted: 2023-05-05

## 2023-05-05 LAB
ANION GAP SERPL CALCULATED.3IONS-SCNC: 6 MMOL/L (ref 4–13)
APTT PPP: 30 SECONDS (ref 23–37)
BUN SERPL-MCNC: 21 MG/DL (ref 5–25)
C-ANCA TITR SER IF: NORMAL TITER
CALCIUM SERPL-MCNC: 9 MG/DL (ref 8.3–10.1)
CHLORIDE SERPL-SCNC: 104 MMOL/L (ref 96–108)
CO2 SERPL-SCNC: 24 MMOL/L (ref 21–32)
CREAT SERPL-MCNC: 0.84 MG/DL (ref 0.6–1.3)
ERYTHROCYTE [DISTWIDTH] IN BLOOD BY AUTOMATED COUNT: 13.1 % (ref 11.6–15.1)
GFR SERPL CREATININE-BSD FRML MDRD: 92 ML/MIN/1.73SQ M
GLUCOSE SERPL-MCNC: 107 MG/DL (ref 65–140)
GLUCOSE SERPL-MCNC: 110 MG/DL (ref 65–140)
GLUCOSE SERPL-MCNC: 149 MG/DL (ref 65–140)
GLUCOSE SERPL-MCNC: 77 MG/DL (ref 65–140)
GLUCOSE SERPL-MCNC: 94 MG/DL (ref 65–140)
HCT VFR BLD AUTO: 36.6 % (ref 36.5–49.3)
HGB BLD-MCNC: 12.7 G/DL (ref 12–17)
INR PPP: 1.03 (ref 0.84–1.19)
MAGNESIUM SERPL-MCNC: 1.7 MG/DL (ref 1.6–2.6)
MCH RBC QN AUTO: 32.3 PG (ref 26.8–34.3)
MCHC RBC AUTO-ENTMCNC: 34.7 G/DL (ref 31.4–37.4)
MCV RBC AUTO: 93 FL (ref 82–98)
MYELOPEROXIDASE AB SER IA-ACNC: <0.2 UNITS (ref 0–0.9)
P-ANCA ATYPICAL TITR SER IF: NORMAL TITER
P-ANCA TITR SER IF: NORMAL TITER
PLATELET # BLD AUTO: 281 THOUSANDS/UL (ref 149–390)
PMV BLD AUTO: 9.9 FL (ref 8.9–12.7)
POTASSIUM SERPL-SCNC: 3.3 MMOL/L (ref 3.5–5.3)
PROTEINASE3 AB SER IA-ACNC: <0.2 UNITS (ref 0–0.9)
PROTHROMBIN TIME: 13.7 SECONDS (ref 11.6–14.5)
RBC # BLD AUTO: 3.93 MILLION/UL (ref 3.88–5.62)
SODIUM SERPL-SCNC: 134 MMOL/L (ref 135–147)
WBC # BLD AUTO: 12.18 THOUSAND/UL (ref 4.31–10.16)

## 2023-05-05 RX ORDER — MAGNESIUM SULFATE HEPTAHYDRATE 40 MG/ML
2 INJECTION, SOLUTION INTRAVENOUS ONCE
Status: COMPLETED | OUTPATIENT
Start: 2023-05-05 | End: 2023-05-05

## 2023-05-05 RX ORDER — POTASSIUM CHLORIDE 20 MEQ/1
20 TABLET, EXTENDED RELEASE ORAL ONCE
Status: COMPLETED | OUTPATIENT
Start: 2023-05-05 | End: 2023-05-05

## 2023-05-05 RX ORDER — POTASSIUM CHLORIDE 20 MEQ/1
40 TABLET, EXTENDED RELEASE ORAL ONCE
Status: COMPLETED | OUTPATIENT
Start: 2023-05-05 | End: 2023-05-05

## 2023-05-05 RX ORDER — POLYETHYLENE GLYCOL 3350 17 G/17G
17 POWDER, FOR SOLUTION ORAL EVERY MORNING
Status: DISCONTINUED | OUTPATIENT
Start: 2023-05-05 | End: 2023-05-12 | Stop reason: HOSPADM

## 2023-05-05 RX ORDER — LANOLIN ALCOHOL/MO/W.PET/CERES
400 CREAM (GRAM) TOPICAL 2 TIMES DAILY
Status: DISCONTINUED | OUTPATIENT
Start: 2023-05-05 | End: 2023-05-05

## 2023-05-05 RX ADMIN — ACETAMINOPHEN 975 MG: 325 TABLET ORAL at 21:37

## 2023-05-05 RX ADMIN — TICAGRELOR 90 MG: 90 TABLET ORAL at 09:45

## 2023-05-05 RX ADMIN — DONEPEZIL HYDROCHLORIDE 5 MG: 5 TABLET ORAL at 09:45

## 2023-05-05 RX ADMIN — ACETAMINOPHEN 975 MG: 325 TABLET ORAL at 05:08

## 2023-05-05 RX ADMIN — TICAGRELOR 90 MG: 90 TABLET ORAL at 21:37

## 2023-05-05 RX ADMIN — POLYETHYLENE GLYCOL 3350 17 G: 17 POWDER, FOR SOLUTION ORAL at 09:45

## 2023-05-05 RX ADMIN — LIDOCAINE 5% 1 PATCH: 700 PATCH TOPICAL at 09:45

## 2023-05-05 RX ADMIN — CITALOPRAM HYDROBROMIDE 20 MG: 20 TABLET ORAL at 10:20

## 2023-05-05 RX ADMIN — HYDROCHLOROTHIAZIDE 25 MG: 25 TABLET ORAL at 09:45

## 2023-05-05 RX ADMIN — DONEPEZIL HYDROCHLORIDE 5 MG: 5 TABLET ORAL at 17:04

## 2023-05-05 RX ADMIN — LOSARTAN POTASSIUM 50 MG: 50 TABLET, FILM COATED ORAL at 09:45

## 2023-05-05 RX ADMIN — TAMSULOSIN HYDROCHLORIDE 0.4 MG: 0.4 CAPSULE ORAL at 17:07

## 2023-05-05 RX ADMIN — HYDRALAZINE HYDROCHLORIDE 25 MG: 25 TABLET, FILM COATED ORAL at 09:45

## 2023-05-05 RX ADMIN — POTASSIUM CHLORIDE 20 MEQ: 1500 TABLET, EXTENDED RELEASE ORAL at 15:02

## 2023-05-05 RX ADMIN — PANTOPRAZOLE SODIUM 20 MG: 20 TABLET, DELAYED RELEASE ORAL at 05:08

## 2023-05-05 RX ADMIN — ATORVASTATIN CALCIUM 40 MG: 40 TABLET, FILM COATED ORAL at 09:45

## 2023-05-05 RX ADMIN — ACETAMINOPHEN 975 MG: 325 TABLET ORAL at 14:30

## 2023-05-05 RX ADMIN — DOCUSATE SODIUM 100 MG: 100 CAPSULE, LIQUID FILLED ORAL at 09:45

## 2023-05-05 RX ADMIN — HEPARIN SODIUM 5000 UNITS: 5000 INJECTION INTRAVENOUS; SUBCUTANEOUS at 14:30

## 2023-05-05 RX ADMIN — MAGNESIUM OXIDE TAB 400 MG (241.3 MG ELEMENTAL MG) 400 MG: 400 (241.3 MG) TAB at 10:20

## 2023-05-05 RX ADMIN — POTASSIUM CHLORIDE 40 MEQ: 1500 TABLET, EXTENDED RELEASE ORAL at 09:45

## 2023-05-05 RX ADMIN — METHOCARBAMOL 500 MG: 500 TABLET ORAL at 13:30

## 2023-05-05 RX ADMIN — MAGNESIUM SULFATE HEPTAHYDRATE 2 G: 40 INJECTION, SOLUTION INTRAVENOUS at 14:32

## 2023-05-05 RX ADMIN — AMLODIPINE BESYLATE 10 MG: 10 TABLET ORAL at 09:45

## 2023-05-05 RX ADMIN — METHOCARBAMOL 500 MG: 500 TABLET ORAL at 17:04

## 2023-05-05 RX ADMIN — SODIUM CHLORIDE, SODIUM LACTATE, POTASSIUM CHLORIDE, AND CALCIUM CHLORIDE 500 ML: .6; .31; .03; .02 INJECTION, SOLUTION INTRAVENOUS at 10:20

## 2023-05-05 RX ADMIN — METHOCARBAMOL 500 MG: 500 TABLET ORAL at 05:08

## 2023-05-05 RX ADMIN — HEPARIN SODIUM 5000 UNITS: 5000 INJECTION INTRAVENOUS; SUBCUTANEOUS at 21:36

## 2023-05-05 RX ADMIN — ASPIRIN 81 MG: 81 TABLET, COATED ORAL at 09:45

## 2023-05-05 RX ADMIN — METOPROLOL SUCCINATE 100 MG: 100 TABLET, EXTENDED RELEASE ORAL at 09:45

## 2023-05-05 RX ADMIN — HEPARIN SODIUM 5000 UNITS: 5000 INJECTION INTRAVENOUS; SUBCUTANEOUS at 05:09

## 2023-05-05 RX ADMIN — HYDRALAZINE HYDROCHLORIDE 25 MG: 25 TABLET, FILM COATED ORAL at 21:39

## 2023-05-05 RX ADMIN — DOCUSATE SODIUM 100 MG: 100 CAPSULE, LIQUID FILLED ORAL at 17:04

## 2023-05-05 NOTE — ASSESSMENT & PLAN NOTE
· Patient presented with AMS, facial droop, and weakness  · Mostly resolved on admission  · H/o stroke with R M1 thrombectomy in 2019  · Admitted with concern for recrudescence of previous stroke like symptoms     Imaging:   · MRI brain 5/2/2023: progressive enlargement of the L parietal region of ischemia noted on DWI  No evidence of right sided ischemia  Formal read pending   · MRI brain 4/26/2023: interval decrease in size of diffusion abnormality in L parietal subcortical region  New small focus in the R frontoparietal region  Small foci in the L parietal and parieto-occipital region  No hemorrhage       Plan:  · Continue to monitor neurological exam   · Ongoing medical management   · Neurology following for stroke care   · Negative for vasculitis   · Ongoing outpatient follow up with neurology

## 2023-05-05 NOTE — PROGRESS NOTES
Daily Progress Note - Critical Care   Laney Mayen 58 y o  male MRN: [id number]  Unit/Bed#: ICU 08 Encounter: [id number]        ----------------------------------------------------------------------------------------  HPI/24hr events: 58year old male with uncontrolled HTN, HLD, prediabetes, bilateral carotid artery stenosis, prior tobacco use, previous R MCA CVA s/p thrombectomy in March '19 without clear etiology despite years of loop recorder (maintained on Plavix 75mg after that), recent L MCA stroke 4/16/23 (empirically AC eliquis 5mg BID w/ ASA 81mg  due to concern for ESUS)  He presents 4/26/23 with initial NIHSS of 2 (for LOC questions)  Initial presenting deficits included L facial droop, R sided weakness, and AMS  Patient did not receive TNK given recent stroke and on Eliquis  Loop recorder negative for arrhythmias  While here  he was found to have new multifocal strokes  IVY 5/2 shows EF 60% with no PFO  MRI 5/2 showed left parietal stroke that appears larger  He underwent angiogram to assess for vasculitis, and he is now s/p left ICA stent 5/4 without signs of vasculitis  Overnight: No events    ---------------------------------------------------------------------------------------  SUBJECTIVE  Slept well overnight  No issues overnight  Denies headache, fevers, chills, lightheadedness, dizziness, chest pain, shortness of breath, abdominal pain, nausea, vomiting  Voiding without issue  He has not had a bowel movement for days  Review of Systems  Review of systems was reviewed and negative unless stated above in HPI/24-hour events   ---------------------------------------------------------------------------------------    Assessment and Plan:  1  Multifocal ischemic strokes with progressively enlarging left parietal lobe stroke  2  Bilateral cervical carotid atherosclerosis  3  Hypertension  4  Leukocytosis  5  Hyponatremia  6  Prediabetes  7  Chronic low back pain  8   Hypokalemia    Neuro:   • Diagnosis: Multifocal ischemic strokes with progressively enlarging left parietal lobe stroke  ? Now s/p left ICA stent 5/1 -no signs of vasculitis  ? Plan:   - S/p left ICA stent 5/4  - Continue aspirin and Brilinta  - Continue statin  - Eliquis held-discuss restarting with neurology  - Decrease frequency of neuro checks to Q4h  - Goal SBP <160   - Monitor on telemetry  - PT/OT/speech  - Neurology consult  - Neurosurgery consult  • Diagnosis: Bilateral carotid artery stenosis  ? Plan:   - S/p left ICA stent 5/4  - Continue aspirin and Brilinta  - Continue statin        CV:   • Diagnosis: Hypertension  ? Reportedly has been uncontrolled outpatient with poor compliance  ? Does not appear to have had complete secondary hypertension work-up  ? Plan:   - Norvasc 10 mg daily  - Hydralazine 25 mg 3 times daily   - HCTZ 25 mg daily  - Losartan 50 mg daily  - Toprol 100 mg daily        Pulm:  • Diagnosis: No acute issues  • Maintain SPO2 >92%           GI:   • Diagnosis:  Constipation  • Plan:  • Start MiraLAX daily, add Senokot as needed     :   • Diagnosis: No acute issues  • Voiding independently  • Monitor urine output        F/E/N:   • Hyponatremia stable at this time  • Trend and replete electrolytes as needed  • Carb controlled diet        Heme/Onc:   • Hemoglobin and platelets stable  • DVT prophylaxis with heparin        Endo:   • Diagnosis: Prediabetes  ? A1c 5 8  ? On glycemic regimen includes metformin  ? Plan:   - Sliding scale insulin as needed  - Continue blood glucose 140-180        ID:   • Diagnosis: SIRS  ? Noted on admission with mild leukocytosis and tachycardia  ? Infectious work-up negative to date  ? Possibly secondary to pain versus reactive leukocytosis from stroke  ? Plan:   - Monitor off antibiotics  - Trend fever curve, WBC count        MSK/Skin:   • Diagnosis: Chronic low back pain  ?  Plan:   - Analgesia:   - Scheduled Tylenol  - Robaxin  - Norco every 6 hours as needed mild pain  - Oxycodone 5/10 every 4 hours as needed mod/severe pain  - Dilaudid 0 5 mg IV every 4 hours as needed BT pain      Disposition: Continue Critical Care   Code Status: Level 1 - Full Code  ---------------------------------------------------------------------------------------  ICU CORE MEASURES    Prophylaxis   VTE Pharmacologic Prophylaxis: Heparin  VTE Mechanical Prophylaxis: sequential compression device  Stress Ulcer Prophylaxis: Prophylaxis Not Indicated     ABCDE Protocol (if indicated)  Plan to perform spontaneous awakening trial today? Not applicable  Plan to perform spontaneous breathing trial today? Not applicable  Obvious barriers to extubation? Not applicable  CAM-ICU: Negative    Invasive Devices Review  Invasive Devices     Peripheral Intravenous Line  Duration           Peripheral IV 23 Dorsal (posterior); Right Hand 3 days    Peripheral IV 23 Left Antecubital <1 day          Arterial Line  Duration           Arterial Line 23 Left Radial <1 day              Can any invasive devices be discontinued today? Not applicable  ---------------------------------------------------------------------------------------  OBJECTIVE    Vitals   Vitals:    23 0300 23 0400 23 0500 23 0600   BP: 154/73 152/69 105/54 115/53   BP Location: Right arm Right arm Right arm Right arm   Pulse: 94 96 100 92   Resp: 19 20 (!) 24 17   Temp:  98 2 °F (36 8 °C)     TempSrc:  Oral     SpO2: 94% 94% 95% 94%   Weight:       Height:         Temp (24hrs), Av 2 °F (36 8 °C), Min:97 2 °F (36 2 °C), Max:99 °F (37 2 °C)  Current: Temperature: 98 2 °F (36 8 °C)    Respiratory:       Invasive/non-invasive ventilation settings   Respiratory    Lab Data (Last 4 hours)    None         O2/Vent Data (Last 4 hours)    None              Physical Exam:  General: Appears comfortable in bed  Eyes: EOMI  Anicteric  Respiratory: Clear to auscultation    Symmetric thorax expansion  Cardiovascular: Regular "rate and rhythm  Extremities appear well-perfused  Abdomen: Soft, nontender, non-distended  Extremities:  Moves extremities spontaneously  Skin: No obvious rashes or lesions  Neuro: Expressive aphasia  Horizontal nystagmus, unable to track finger  Pupils equal and reactive  GCS 15   5/5 strength throughout  Sensation intact  Laboratory and Diagnostics:  Results from last 7 days   Lab Units 05/05/23  0540 05/04/23  1428 05/03/23  0553 05/02/23  0524 05/01/23  0617   WBC Thousand/uL 12 18*  --  12 81* 11 98* 11 96*   HEMOGLOBIN g/dL 12 7  --  14 5 14 9 15 6   HEMATOCRIT % 36 6  --  41 8 43 3 46 1   PLATELETS Thousands/uL 281 322 328 290 292   NEUTROS PCT %  --   --  63  --   --    MONOS PCT %  --   --  11  --   --      Results from last 7 days   Lab Units 05/05/23  0540 05/03/23  0553 05/02/23  0524 05/01/23  0617 04/29/23  0510 04/28/23  1112   SODIUM mmol/L 134* 134* 134* 132* 133* 133*   POTASSIUM mmol/L 3 3* 3 9 3 3* 4 7 3 4* 3 9   CHLORIDE mmol/L 104 104 102 101 101 101   CO2 mmol/L 24 27 26 27 28 28   ANION GAP mmol/L 6 3* 6 4 4 4   BUN mg/dL 21 37* 41* 36* 29* 24   CREATININE mg/dL 0 84 1 20 1 20 1 19 1 10 1 08   CALCIUM mg/dL 9 0 9 6 9 8 9 8 9 5 9 7   GLUCOSE RANDOM mg/dL 107 104 98 112 106 105          Results from last 7 days   Lab Units 05/05/23  0540   INR  1 03   PTT seconds 30              ABG:    VBG:          Micro        EKG: Reviewed  Imaging: Reviewed    Intake and Output  I/O       05/03 0701 05/04 0700 05/04 0701 05/05 0700    P  O  360 120    I V  (mL/kg)  550 (5 3)    Total Intake(mL/kg) 360 (3 5) 670 (6 5)    Urine (mL/kg/hr) 200 (0 1) 1075 (0 4)    Total Output 200 1075    Net +160 -405          Unmeasured Urine Occurrence 2 x           Height and Weights   Height: 5' 8\" (172 7 cm)  IBW (Ideal Body Weight): 68 4 kg  Body mass index is 34 52 kg/m²  Weight (last 2 days)     Date/Time    05/04/23 1330    Comment rows:    OBSERV: Pt 's wife at his bedside   at 05/04/23 1330    " Nutrition       Diet Orders   (From admission, onward)             Start     Ordered    05/04/23 1516  Diet Sanchez/CHO Controlled; Consistent Carbohydrate Diet Level 3 (6 carb servings/90 grams CHO/meal)  Diet effective now        References:    Nutrtion Support Algorithm Enteral vs  Parenteral   Question Answer Comment   Diet Type Sanchez/CHO Controlled    Sanchez/CHO Controlled Consistent Carbohydrate Diet Level 3 (6 carb servings/90 grams CHO/meal)    RD to adjust diet per protocol?  Yes        05/04/23 1515                Active Medications  Scheduled Meds:  Current Facility-Administered Medications   Medication Dose Route Frequency Provider Last Rate   • acetaminophen  975 mg Oral Q8H Mat Rowley MD     • amLODIPine  10 mg Oral Daily Jillian Mitchell MD     • aspirin  81 mg Oral Daily Jillian Mitchell MD     • atorvastatin  40 mg Oral Daily Jillian Mitchell MD     • calcium carbonate  1,000 mg Oral Daily PRN Jillian Mitchell MD     • citalopram  20 mg Oral Daily Jillian Mitchell MD     • docusate sodium  100 mg Oral BID Jillian Mitchell MD     • donepezil  5 mg Oral BID Jillian Mitchell MD     • heparin (porcine)  5,000 Units Subcutaneous Q8H Mat Rowley MD     • labetalol  5 mg Intravenous Q15 Min PRN Jillian Mitchell MD      And   • hydrALAZINE  15 mg Intravenous Q15 Min PRN Jillian Mitchell MD      And   • niCARdipine  1-15 mg/hr Intravenous Continuous PRN Jillian Mitchell MD     • hydrALAZINE  25 mg Oral TID Jillian Mitchell MD     • hydrochlorothiazide  25 mg Oral Daily Jillian Mitchell MD     • HYDROcodone-acetaminophen  1 tablet Oral Q6H PRN David Vo MD     • HYDROmorphone  0 5 mg Intravenous Q4H PRN Jillian Mitchell MD     • insulin lispro  1-6 Units Subcutaneous TID AC Jillian Mitchell MD     • insulin lispro  1-6 Units Subcutaneous HS Jillian Mitchell MD     • lidocaine  1 patch Topical Daily David Vo MD     • LORazepam  1 mg Intravenous Q6H PRN Jillian Mitchell MD     • losartan  50 mg Oral Daily David Vo "MD     • methocarbamol  500 mg Oral Q6H Lexie Perkins MD     • metoprolol succinate  100 mg Oral Daily Shay Dumont MD     • nicotine  14 mg Transdermal Daily Shay Dumont MD     • ondansetron  4 mg Intravenous Q6H PRN Shay Dumont MD     • oxyCODONE  10 mg Oral Q4H PRN Shay Dumont MD     • oxyCODONE  5 mg Oral Q4H PRN Shay Dumont MD     • pantoprazole  20 mg Oral Early Morning Shay Dumont MD     • phenylephine   mcg/min Intravenous Continuous PRN Shay Dumont MD     • senna  2 tablet Oral Daily PRN Shay Dumont MD     • tamsulosin  0 4 mg Oral Daily With Dara Ferro MD     • ticagrelor  90 mg Oral Q12H Lexie Perkins MD       Continuous Infusions:  niCARdipine, 1-15 mg/hr  phenylephine,  mcg/min      PRN Meds:   calcium carbonate, 1,000 mg, Daily PRN  labetalol, 5 mg, Q15 Min PRN   And  hydrALAZINE, 15 mg, Q15 Min PRN   And  niCARdipine, 1-15 mg/hr, Continuous PRN  HYDROcodone-acetaminophen, 1 tablet, Q6H PRN  HYDROmorphone, 0 5 mg, Q4H PRN  LORazepam, 1 mg, Q6H PRN  ondansetron, 4 mg, Q6H PRN  oxyCODONE, 10 mg, Q4H PRN  oxyCODONE, 5 mg, Q4H PRN  phenylephine,  mcg/min, Continuous PRN  senna, 2 tablet, Daily PRN        Allergies   Allergies   Allergen Reactions   • Flexeril [Cyclobenzaprine] Drowsiness   • Lisinopril Cough   • Nsaids Confusion     ---------------------------------------------------------------------------------------  Advance Directive and Living Will:      Power of :    POLST:    ---------------------------------------------------------------------------------------  Care Time Delivered:   60 minutes    Lily Delcid,       Portions of the record may have been created with voice recognition software  Occasional wrong word or \"sound a like\" substitutions may have occurred due to the inherent limitations of voice recognition software    Read the chart carefully and recognize, using context, where substitutions have occurred   "

## 2023-05-05 NOTE — SEPSIS NOTE
Sepsis Note   Belen Narayanan 58 y o  male MRN: [id number]  Unit/Bed#: ICU 08 Encounter: [id number]       Initial Sepsis Screening     [address] Name 05/04/23 2044                Is the patient's history suggestive of a new or worsening infection? No  -LB              User Key  (r) = Recorded By, (t) = Taken By, (c) = Cosigned By    [address] Name Provider Type    LB ERIC Aj Nurse Practitioner                    Body mass index is 34 52 kg/m²    Wt Readings from Last 1 Encounters:   05/02/23 103 kg (227 lb)        Ideal body weight: 68 4 kg (150 lb 12 7 oz)  Adjusted ideal body weight: 82 2 kg (181 lb 4 4 oz)

## 2023-05-05 NOTE — QUICK NOTE
Ramu Arcos called and expressed concern for pt hx of back pain, pt was being followed by [address] receiving epidural injections for back pain throughout the past ten years most recently pt threw his back out 8 weeks ago and had an appt for his back pain on the day of admission  Mrs Hermila Abraham reports pt has been leaning to the right due to his back pain prior to admission  Pt wife also shared pt takes medications that were not prescribed by his provider and were ordered online and plans on bringing them in    1  CBD Gummies all natural hemp extract  300 mg unknown last administration and frequency   2  Score blue- Tadalafil 20 mg Rx [id number] unknown last admin and frequency

## 2023-05-05 NOTE — PLAN OF CARE
Problem: PAIN - ADULT  Goal: Verbalizes/displays adequate comfort level or baseline comfort level  Description: Interventions:  - Encourage patient to monitor pain and request assistance  - Assess pain using appropriate pain scale  - Administer analgesics based on type and severity of pain and evaluate response  - Implement non-pharmacological measures as appropriate and evaluate response  - Consider cultural and social influences on pain and pain management  - Notify physician/advanced practitioner if interventions unsuccessful or patient reports new pain  Outcome: Progressing     Problem: INFECTION - ADULT  Goal: Absence or prevention of progression during hospitalization  Description: INTERVENTIONS:  - Assess and monitor for signs and symptoms of infection  - Monitor lab/diagnostic results  - Monitor all insertion sites, i e  indwelling lines, tubes, and drains  - Monitor endotracheal if appropriate and nasal secretions for changes in amount and color  - [place] appropriate cooling/warming therapies per order  - Administer medications as ordered  - Instruct and encourage patient and family to use good hand hygiene technique  - Identify and instruct in appropriate isolation precautions for identified infection/condition  Outcome: Progressing     Problem: SAFETY ADULT  Goal: Patient will remain free of falls  Description: INTERVENTIONS:  - Educate patient/family on patient safety including physical limitations  - Instruct patient to call for assistance with activity   - Consult OT/PT to assist with strengthening/mobility   - Keep Call bell within reach  - Keep bed low and locked with side rails adjusted as appropriate  - Keep care items and personal belongings within reach  - Initiate and maintain comfort rounds  - Make Fall Risk Sign visible to staff  - Offer Toileting every 2 Hours, in advance of need  - Initiate/Maintain bed alarm  - Obtain necessary fall risk management equipment: bed alarm  - Apply yellow socks and bracelet for high fall risk patients  - Consider moving patient to room near nurses station  Outcome: Progressing     Problem: DISCHARGE PLANNING  Goal: Discharge to home or other facility with appropriate resources  Description: INTERVENTIONS:  - Identify barriers to discharge w/patient and caregiver  - Arrange for needed discharge resources and transportation as appropriate  - Identify discharge learning needs (meds, wound care, etc )  - Arrange for interpretive services to assist at discharge as needed  - Refer to Case Management Department for coordinating discharge planning if the patient needs post-hospital services based on physician/advanced practitioner order or complex needs related to functional status, cognitive ability, or social support system  Outcome: Progressing     Problem: Knowledge Deficit  Goal: Patient/family/caregiver demonstrates understanding of disease process, treatment plan, medications, and discharge instructions  Description: Complete learning assessment and assess knowledge base  Interventions:  - Provide teaching at level of understanding  - Provide teaching via preferred learning methods  Outcome: Progressing     Problem: NEUROSENSORY - ADULT  Goal: Achieves stable or improved neurological status  Description: INTERVENTIONS  - Monitor and report changes in neurological status  - Monitor vital signs such as temperature, blood pressure, glucose, and any other labs ordered   - Initiate measures to prevent increased intracranial pressure  - Monitor for seizure activity and implement precautions if appropriate      Outcome: Progressing  Goal: Achieves maximal functionality and self care  Description: INTERVENTIONS  - Monitor swallowing and airway patency with patient fatigue and changes in neurological status  - Encourage and assist patient to increase activity and self care     - Encourage visually impaired, hearing impaired and aphasic patients to use assistive/communication devices  Outcome: Progressing     Problem: CARDIOVASCULAR - ADULT  Goal: Maintains optimal cardiac output and hemodynamic stability  Description: INTERVENTIONS:  - Monitor I/O, vital signs and rhythm  - Monitor for S/S and trends of decreased cardiac output  - Administer and titrate ordered vasoactive medications to optimize hemodynamic stability  - Assess quality of pulses, skin color and temperature  - Assess for signs of decreased coronary artery perfusion  - Instruct patient to report change in severity of symptoms  Outcome: Progressing  Goal: Absence of cardiac dysrhythmias or at baseline rhythm  Description: INTERVENTIONS:  - Continuous cardiac monitoring, vital signs, obtain 12 lead EKG if ordered  - Administer antiarrhythmic and heart rate control medications as ordered  - Monitor electrolytes and administer replacement therapy as ordered  Outcome: Progressing     Problem: METABOLIC, FLUID AND ELECTROLYTES - ADULT  Goal: Electrolytes maintained within normal limits  Description: INTERVENTIONS:  - Monitor labs and assess patient for signs and symptoms of electrolyte imbalances  - Administer electrolyte replacement as ordered  - Monitor response to electrolyte replacements, including repeat lab results as appropriate  - Instruct patient on fluid and nutrition as appropriate  Outcome: Progressing  Goal: Glucose maintained within target range  Description: INTERVENTIONS:  - Monitor Blood Glucose as ordered  - Assess for signs and symptoms of hyperglycemia and hypoglycemia  - Administer ordered medications to maintain glucose within target range  - Assess nutritional intake and initiate nutrition service referral as needed  Outcome: Progressing     Problem: MOBILITY - ADULT  Goal: Maintain or return to baseline ADL function  Description: INTERVENTIONS:  -  Assess patient's ability to carry out ADLs; assess patient's baseline for ADL function and identify physical deficits which impact ability to perform ADLs (bathing, care of mouth/teeth, toileting, grooming, dressing, etc )  - Assess/evaluate cause of self-care deficits   - Assess range of motion  - Assess patient's mobility; develop plan if impaired  - Assess patient's need for assistive devices and provide as appropriate  - Encourage maximum independence but intervene and supervise when necessary  - Involve family in performance of ADLs  - Assess for home care needs following discharge   - Consider OT consult to assist with ADL evaluation and planning for discharge  - Provide patient education as appropriate  Outcome: Progressing  Goal: Maintains/Returns to pre admission functional level  Description: INTERVENTIONS:  - Perform BMAT or MOVE assessment daily    - Set and communicate daily mobility goal to care team and patient/family/caregiver  - Collaborate with rehabilitation services on mobility goals if consulted  - Perform Range of Motion 3 times a day  - Reposition patient every 2 hours    - Dangle patient 2 times a day  - Stand patient 2 times a day  - Ambulate patient 2 times a day  - Out of bed to chair 2 times a day   - Out of bed for meals 3 times a day  - Out of bed for toileting  - Record patient progress and toleration of activity level   Outcome: Progressing     Problem: Prexisting or High Potential for Compromised Skin Integrity  Goal: Skin integrity is maintained or improved  Description: INTERVENTIONS:  - Identify patients at risk for skin breakdown  - Assess and monitor skin integrity  - Assess and monitor nutrition and hydration status  - Monitor labs   - Assess for incontinence   - Turn and reposition patient  - Assist with mobility/ambulation  - Relieve pressure over bony prominences  - Avoid friction and shearing  - Provide appropriate hygiene as needed including keeping skin clean and dry  - Evaluate need for skin moisturizer/barrier cream  - Collaborate with interdisciplinary team   - Patient/family teaching  - Consider wound care consult Outcome: Progressing     Problem: Nutrition/Hydration-ADULT  Goal: Nutrient/Hydration intake appropriate for improving, restoring or maintaining nutritional needs  Description: Monitor and assess patient's nutrition/hydration status for malnutrition  Collaborate with interdisciplinary team and initiate plan and interventions as ordered  Monitor patient's weight and dietary intake as ordered or per policy  Utilize nutrition screening tool and intervene as necessary  Determine patient's food preferences and provide high-protein, high-caloric foods as appropriate       INTERVENTIONS:  - Monitor oral intake, urinary output, labs, and treatment plans  - Assess nutrition and hydration status and recommend course of action  - Evaluate amount of meals eaten  - Assist patient with eating if necessary   - Allow adequate time for meals  - Recommend/ encourage appropriate diets, oral nutritional supplements, and vitamin/mineral supplements  - Order, calculate, and assess calorie counts as needed  - Recommend, monitor, and adjust tube feedings and TPN/PPN based on assessed needs  - Assess need for intravenous fluids  - Provide specific nutrition/hydration education as appropriate  - Include patient/family/caregiver in decisions related to nutrition  Outcome: Progressing

## 2023-05-05 NOTE — ASSESSMENT & PLAN NOTE
Reports chronic lower back pain x40 years with acute flare x 4 weeks which has not improved despite multiple trails of steroids, nsaids and muscle relaxants from the PCP       Plan:   · Scheduled Tylenol  · Lidocaine patch  · Norco every 6 hours PRN mild pain  · Oxy 5/10 every 4 hours PRN moderate/severe pain  · Dilaudid 0 5 mg IV every 4 hours PRN BT pain

## 2023-05-05 NOTE — ASSESSMENT & PLAN NOTE
Patient with persistent tachycardia since admission  Differential includes volume depletion, infection, medication induced, or less likely PE  Plan:  · Suspect tachycardia 2/2 volume depletion as heart rate improved with fluid bolus  · Follow-up infectious work-up with [facility]  · If tachycardia persists, consider further evaluation for PE-however less likely given low Wells score and not requiring oxygen    D-dimer would be unreliable in the setting of recent stroke  · Monitor heart rate

## 2023-05-05 NOTE — TELEPHONE ENCOUNTER
[address]  06/26/2023 APT Agustina Person W/CAROTID DOPPLER    Tom Lewis, PA-C   S/p carotid stenting  6 week visit with Dr Oneil Perry   Carotid dopplers

## 2023-05-05 NOTE — ASSESSMENT & PLAN NOTE
Reportedly has been uncontrolled outpatient with poor compliance   Does not appear to have had complete secondary hypertension work-up    Plan:  · Norvasc 10 mg daily  · Hydralazine 25 mg 3 times daily   · HCTZ 25 mg daily  · Losartan 50 mg daily  · Toprol 100 mg daily  · Recommend outpatient secondary to potential work-up

## 2023-05-05 NOTE — ASSESSMENT & PLAN NOTE
· PPD 1 from cerebral angiogram with L ICA stenting with Dr Alexander Mosher 5/4/23  · Negative for vasculitis   · Bilateral ICAD    Plan:   · ongoing frequent neurological checks  · Recommend STAT CT head for decline in GCS >2 points in 1 hour  · BP goal <180 with MAP >85  · DVT ppx: SCD's  · ASA and brilinta at this time   · If patient requires [facility] [place] therapy then can stop ASA and continue with Brilinta and AC  · Defer determination of medical therapy to neurology and [place]  · Neurology following for ongoing stroke care   · Groin check- pulses intact and dressing removed  · PT/OT evaluation  · Neurosurgery will sign off and follow up in 6 weeks with carotid dopplers  Call with questions or concerns

## 2023-05-05 NOTE — ASSESSMENT & PLAN NOTE
63M with uncontrolled HTN, HLD, prediabetes, previous R MCA CVA s/p thrombectomy in March '19 without clear etiology despite years of loop recorder, recent L MCA stroke 4/16/23 (empirically AC eliquis 5mg BID w/ ASA 81mg  due to concern for ESUS)  He presents 4/26/23 with L facial droop, R sided weakness, and AMS  Patient did not receive TNK given recent stroke and on Eliquis  Loop recorder negative for arrhythmias  While here, he was found to have new multifocal strokes  IVY 5/2 shows EF 60% with no PFO  MRI 5/2 showed left parietal stroke that appears larger  He underwent angiogram to assess for vasculitis, and he is now s/p left ICA stent 5/4 without signs of vasculitis  · Plan:   · S/p left ICA stent 5/4  · Continue aspirin and Brilinta  · Continue statin  · PT/OT/speech  · Neurology consult  · Neurology recommends outpatient follow-up with vascular surgery for right ICA  Consider cardiology consult for loop recorder placement

## 2023-05-05 NOTE — PROGRESS NOTES
CRITICAL CARE TRANSFER NOTE    * Stroke [facility])  Assessment & Plan  63M with uncontrolled HTN, HLD, prediabetes, previous R MCA CVA s/p thrombectomy in March '19 without clear etiology despite years of loop recorder, recent L MCA stroke 4/16/23 (empirically AC eliquis 5mg BID w/ ASA 81mg  due to concern for ESUS)  He presents 4/26/23 with L facial droop, R sided weakness, and AMS  Patient did not receive TNK given recent stroke and on Eliquis  Loop recorder negative for arrhythmias  While here, he was found to have new multifocal strokes  IVY 5/2 shows EF 60% with no PFO  MRI 5/2 showed left parietal stroke that appears larger  He underwent angiogram to assess for vasculitis, and he is now s/p left ICA stent 5/4 without signs of vasculitis  · Plan:   · S/p left ICA stent 5/4  · Continue aspirin and Brilinta  · Continue statin  · PT/OT/speech  · Neurology consult  · Neurology recommends outpatient follow-up with vascular surgery for right ICA  Consider cardiology consult for loop recorder placement  Bilateral carotid artery stenosis  Assessment & Plan  S/p left ICA stent 5/4    Plan:   · Continue aspirin and Brillinta  · Continue statin  · Outpatient follow-up with vascular surgery    Tachycardia  Assessment & Plan  Patient with persistent tachycardia since admission  Differential includes volume depletion, infection, medication induced, or less likely PE  Plan:  · Suspect tachycardia 2/2 volume depletion as heart rate improved with fluid bolus  · Follow-up infectious work-up with UA  · If tachycardia persists, consider further evaluation for PE-however less likely given low Wells score and not requiring oxygen  D-dimer would be unreliable in the setting of recent stroke  · Monitor heart rate    SIRS (systemic inflammatory response syndrome) ([facility] [address] )  Assessment & Plan  Noted on admission with WBC 12K, tachycardia  Suspect this is reactive in the setting of stroke       Plan:  · Still has persistent leukocytosis  · Follow-up UA  · Monitor off antibiotics  · Trend fever curve, WBC count    Chronic low back pain  Assessment & Plan  Reports chronic lower back pain x40 years with acute flare x 4 weeks which has not improved despite multiple trails of steroids, nsaids and muscle relaxants from the PCP  Plan:   · Scheduled Tylenol  · Lidocaine patch  · Norco every 6 hours PRN mild pain  · Oxy 5/10 every 4 hours PRN moderate/severe pain  · Dilaudid 0 5 mg IV every 4 hours PRN BT pain    Primary hypertension  Assessment & Plan  Reportedly has been uncontrolled outpatient with poor compliance  Does not appear to have had complete secondary hypertension work-up    Plan:  · Norvasc 10 mg daily  · Hydralazine 25 mg 3 times daily   · HCTZ 25 mg daily  · Losartan 50 mg daily  · Toprol 100 mg daily  · Recommend outpatient secondary to potential work-up    Code Status: Level 1 - Full Code    Reason for ICU/Stepdown Admission:   Stroke    Consultants:  Neurology  Neurosurgery    HPI and Hospital Course:   58year old male with uncontrolled HTN, HLD, prediabetes, bilateral carotid artery stenosis, prior tobacco use, previous R MCA CVA s/p thrombectomy in March '19 without clear etiology despite years of loop recorder (maintained on Plavix 75mg after that), recent L MCA stroke 4/16/23 (empirically AC eliquis 5mg BID w/ ASA 81mg  due to concern for ESUS)  He presents 4/26/23 with initial NIHSS of 2 (for LOC questions)  Initial presenting deficits included L facial droop, R sided weakness, and AMS  Patient did not receive TNK given recent stroke and on Eliquis  Loop recorder negative for arrhythmias  While here, he was found to have new multifocal strokes  IVY 5/2 shows EF 60% with no PFO  MRI 5/2 showed left parietal stroke that appears larger  He underwent angiogram to assess for vasculitis, and he is now s/p left ICA stent 5/4 without signs of vasculitis    Neurology recommends continuing Brilinta 90 mg twice daily and aspirin 81 mg daily for left ICA stent  Do not resume Eliquis  Neurology recommends outpatient follow-up with vascular surgery for right ICA  Consider cardiology consult for loop recorder placement  Of note, throughout admission patient had mild leukocytosis with tachycardia  We initiated infectious work-up, UA will need to be followed up on  If patient remains tachycardic without clear etiology, consider CTA to rule out PE  Heart rate seems to have improved after fluid bolus 5/5  Please refer to today's progress note for further clinical details    LDA's and reason for remaining devices: Invasive Devices     Peripheral Intravenous Line  Duration           Peripheral IV 05/01/23 Dorsal (posterior); Right Hand 4 days    Peripheral IV 05/04/23 Left Antecubital 1 day          Arterial Line  Duration           Arterial Line 05/04/23 Left Radial 1 day              Family aware of transfer out of critical care?  Yes- updated patient's wife Jaylan Nguyễn at bedside    Spoke with Kallie Flood regarding transfer to the [facility] service at [address] [place] [address], D O   PGY-2 Internal Medicine   [facility]

## 2023-05-05 NOTE — PROGRESS NOTES
[facility]  Progress Note  Name: Deborah Claudio  MRN: [id number]  Unit/Bed#: ICU 08 I Date of Admission: 4/26/2023   Date of Service: 5/5/2023 I Hospital Day: 9    Assessment/Plan   Bilateral carotid artery stenosis  Assessment & Plan  · PPD 1 from cerebral angiogram with L ICA stenting with Dr Augustine Clay 5/4/23  · Negative for vasculitis   · Bilateral ICAD    Plan:   · ongoing frequent neurological checks  · Recommend STAT CT head for decline in GCS >2 points in 1 hour  · BP goal <180 with MAP >85  · DVT ppx: SCD's  · ASA and brilinta at this time   · If patient requires [facility] therapy then can stop ASA and continue with Brilinta and AC  · Defer determination of medical therapy to neurology and [place]  · Neurology following for ongoing stroke care   · Groin check- pulses intact and dressing removed  · PT/OT evaluation  · Neurosurgery will sign off and follow up in 6 weeks with carotid dopplers  Call with questions or concerns  * Stroke [facility])  Assessment & Plan  · Patient presented with AMS, facial droop, and weakness  · Mostly resolved on admission  · H/o stroke with R M1 thrombectomy in 2019  · Admitted with concern for recrudescence of previous stroke like symptoms     Imaging:   · MRI brain 5/2/2023: progressive enlargement of the L parietal region of ischemia noted on DWI  No evidence of right sided ischemia  Formal read pending   · MRI brain 4/26/2023: interval decrease in size of diffusion abnormality in L parietal subcortical region  New small focus in the R frontoparietal region  Small foci in the L parietal and parieto-occipital region  No hemorrhage  Plan:  · Continue to monitor neurological exam   · Ongoing medical management   · Neurology following for stroke care   · Negative for vasculitis   · Ongoing outpatient follow up with neurology            Subjective/Objective   Chief Complaint: None     Subjective: Patient has no acute complaints or concerns    He "is sitting upright in bed in no acute distress  His speech seems slightly more fluent with less expressive troubles compared to initial evaluation on 5/3  No acute issues per nursing staff  Limited sleep overnight  Objective: Sitting upright in bed    I/O       05/03 0701 05/04 0700 05/04 0701 05/05 0700 05/05 0701 05/06 0700    P  O  360 120     I V  (mL/kg)  550 (5 3)     IV Piggyback   500    Total Intake(mL/kg) 360 (3 5) 670 (6 5) 500 (4 9)    Urine (mL/kg/hr) 200 (0 1) 1075 (0 4) 250 (0 7)    Total Output 200 1075 250    Net +160 -405 +250           Unmeasured Urine Occurrence 2 x  1 x          Invasive Devices     Peripheral Intravenous Line  Duration           Peripheral IV 05/01/23 Dorsal (posterior); Right Hand 4 days    Peripheral IV 05/04/23 Left Antecubital <1 day          Arterial Line  Duration           Arterial Line 05/04/23 Left Radial 1 day                Physical Exam:  Vitals: Blood pressure 152/91, pulse (!) 106, temperature 98 2 °F (36 8 °C), temperature source Oral, resp  rate 21, height 5' 8\" (1 727 m), weight 103 kg (227 lb), SpO2 95 %  ,Body mass index is 34 52 kg/m²  Hemodynamic Monitoring: MAP: Arterial Line MAP (mmHg): 74 mmHg, ICP Mean:      General appearance: alert, appears stated age, cooperative and no distress  Head: Normocephalic, without obvious abnormality, atraumatic  Eyes: PERRL  Neck: supple, symmetrical, trachea midline  Back: no kyphosis present  Lungs: non labored breathing  Heart: regular heart rate  Neurologic:   Mental status: Alert, oriented to person and place  Patient has difficulty recalling month and years  Able to choose [facility] from multiple choices    Cranial nerves: grossly intact (Cranial nerves II-XII)  Sensory: normal to light touch  Motor: moving all extremities without focal weakness 5/5 strength  Coordination: no drift bilaterally    Lab Results:  Results from last 7 days   Lab Units 05/05/23  0540 05/04/23  1428 05/03/23  0553 05/02/23  0524 " WBC Thousand/uL 12 18*  --  12 81* 11 98*   HEMOGLOBIN g/dL 12 7  --  14 5 14 9   HEMATOCRIT % 36 6  --  41 8 43 3   PLATELETS Thousands/uL 281 322 328 290   NEUTROS PCT %  --   --  63  --    MONOS PCT %  --   --  11  --      Results from last 7 days   Lab Units 05/05/23  0540 05/03/23  0553 05/02/23  0524   POTASSIUM mmol/L 3 3* 3 9 3 3*   CHLORIDE mmol/L 104 104 102   CO2 mmol/L 24 27 26   BUN mg/dL 21 37* 41*   CREATININE mg/dL 0 84 1 20 1 20   CALCIUM mg/dL 9 0 9 6 9 8     Results from last 7 days   Lab Units 05/05/23  0540   MAGNESIUM mg/dL 1 7         Results from last 7 days   Lab Units 05/05/23  0540   INR  1 03   PTT seconds 30     No results found for: TROPONINT  ABG:No results found for: PHART, UXJ7WPR, PO2ART, QUE6PEZ, Q9VKQYVB, BEART, SOURCE    Imaging Studies: I have personally reviewed pertinent reports  and I have personally reviewed pertinent films in PACS  XR chest pa & lateral    Result Date: 4/28/2023  Impression: No acute cardiopulmonary disease  Workstation performed: [id number]     X-ray lumbar spine complete 4+ views    Result Date: 4/26/2023  Impression: No lumbar vertebral body fracture or subluxation  Multilevel degenerative changes  Workstation performed: [id number]     MRI brain wo contrast    Result Date: 4/27/2023  Impression: Compared to the prior recent MRI examination dated 4/17/2023, interval increase in size of diffusion abnormality in the left parietal subcortical and periventricular white matter consistent with acute to subacute ischemia  New small focus of gyral diffusion abnormality in the right frontal parietal region (4:20)  Persistent additional small foci of diffusion abnormality in the left periatrial and parieto-occipital region  No acute hemorrhage or midline shift  Stable moderate chronic microangiopathic changes within the brain  Study was marked in [facility] for immediate notification   Workstation performed: [id number]     CT stroke alert brain    Result Date: 4/26/2023  Impression: No acute intracranial abnormality  Chronic microangiopathy  Small evolving subacute infarct in the left parietal periventricular white matter  Other small recent infarcts better visualized on previous MRI  Findings were directly discussed with Madie Burnham at 3:42 p m  Workstation performed: [id number]     CTA stroke alert (head/neck)    Result Date: 4/26/2023  Impression: Stable moderate (60 to 65%) stenosis in the bilateral internal carotid arteries  Stable severe stenosis distal right M1 segment  Stable severe stenosis proximal left M2  No new intracranial stenosis or large vessel occlusion  Findings were directly discussed with Madie Burnham at 3:42 p m  Workstation performed: [id number]       EKG, Pathology, and Other Studies: I have personally reviewed pertinent reports        VTE Pharmacologic Prophylaxis: None ordered    VTE Mechanical Prophylaxis: sequential compression device

## 2023-05-05 NOTE — QUICK NOTE
Spoke with patient's wife and updated them on the patient's current condition, care management plan, and goals  All questions were answered to their satisfaction

## 2023-05-05 NOTE — ASSESSMENT & PLAN NOTE
Noted on admission with WBC 12K, tachycardia  Suspect this is reactive in the setting of stroke       Plan:  · Still has persistent leukocytosis  · Follow-up UA  · Monitor off antibiotics  · Trend fever curve, WBC count

## 2023-05-05 NOTE — ASSESSMENT & PLAN NOTE
S/p left ICA stent 5/4    Plan:   · Continue aspirin and Brillinta  · Continue statin  · Outpatient follow-up with vascular surgery

## 2023-05-06 LAB
ANION GAP SERPL CALCULATED.3IONS-SCNC: 4 MMOL/L (ref 4–13)
BASOPHILS # BLD AUTO: 0.14 THOUSANDS/ÂΜL (ref 0–0.1)
BASOPHILS NFR BLD AUTO: 1 % (ref 0–1)
BUN SERPL-MCNC: 25 MG/DL (ref 5–25)
CALCIUM SERPL-MCNC: 9 MG/DL (ref 8.3–10.1)
CHLORIDE SERPL-SCNC: 108 MMOL/L (ref 96–108)
CO2 SERPL-SCNC: 24 MMOL/L (ref 21–32)
CREAT SERPL-MCNC: 0.77 MG/DL (ref 0.6–1.3)
EOSINOPHIL # BLD AUTO: 0.95 THOUSAND/ÂΜL (ref 0–0.61)
EOSINOPHIL NFR BLD AUTO: 7 % (ref 0–6)
ERYTHROCYTE [DISTWIDTH] IN BLOOD BY AUTOMATED COUNT: 13.2 % (ref 11.6–15.1)
GFR SERPL CREATININE-BSD FRML MDRD: 95 ML/MIN/1.73SQ M
GLUCOSE SERPL-MCNC: 104 MG/DL (ref 65–140)
GLUCOSE SERPL-MCNC: 105 MG/DL (ref 65–140)
GLUCOSE SERPL-MCNC: 105 MG/DL (ref 65–140)
GLUCOSE SERPL-MCNC: 90 MG/DL (ref 65–140)
GLUCOSE SERPL-MCNC: 97 MG/DL (ref 65–140)
HCT VFR BLD AUTO: 35.7 % (ref 36.5–49.3)
HGB BLD-MCNC: 12.1 G/DL (ref 12–17)
IMM GRANULOCYTES # BLD AUTO: 0.08 THOUSAND/UL (ref 0–0.2)
IMM GRANULOCYTES NFR BLD AUTO: 1 % (ref 0–2)
LYMPHOCYTES # BLD AUTO: 2.84 THOUSANDS/ÂΜL (ref 0.6–4.47)
LYMPHOCYTES NFR BLD AUTO: 21 % (ref 14–44)
MAGNESIUM SERPL-MCNC: 2.2 MG/DL (ref 1.6–2.6)
MCH RBC QN AUTO: 32 PG (ref 26.8–34.3)
MCHC RBC AUTO-ENTMCNC: 33.9 G/DL (ref 31.4–37.4)
MCV RBC AUTO: 94 FL (ref 82–98)
MONOCYTES # BLD AUTO: 1.6 THOUSAND/ÂΜL (ref 0.17–1.22)
MONOCYTES NFR BLD AUTO: 12 % (ref 4–12)
NEUTROPHILS # BLD AUTO: 8.27 THOUSANDS/ÂΜL (ref 1.85–7.62)
NEUTS SEG NFR BLD AUTO: 58 % (ref 43–75)
NRBC BLD AUTO-RTO: 0 /100 WBCS
PLATELET # BLD AUTO: 257 THOUSANDS/UL (ref 149–390)
PMV BLD AUTO: 9.8 FL (ref 8.9–12.7)
POTASSIUM SERPL-SCNC: 3.7 MMOL/L (ref 3.5–5.3)
RBC # BLD AUTO: 3.78 MILLION/UL (ref 3.88–5.62)
SODIUM SERPL-SCNC: 136 MMOL/L (ref 135–147)
WBC # BLD AUTO: 13.88 THOUSAND/UL (ref 4.31–10.16)

## 2023-05-06 RX ORDER — AMOXICILLIN 250 MG
2 CAPSULE ORAL 2 TIMES DAILY
Status: DISCONTINUED | OUTPATIENT
Start: 2023-05-06 | End: 2023-05-12 | Stop reason: HOSPADM

## 2023-05-06 RX ADMIN — HEPARIN SODIUM 5000 UNITS: 5000 INJECTION INTRAVENOUS; SUBCUTANEOUS at 15:03

## 2023-05-06 RX ADMIN — HYDRALAZINE HYDROCHLORIDE 25 MG: 25 TABLET, FILM COATED ORAL at 09:37

## 2023-05-06 RX ADMIN — HYDRALAZINE HYDROCHLORIDE 25 MG: 25 TABLET, FILM COATED ORAL at 17:11

## 2023-05-06 RX ADMIN — TICAGRELOR 90 MG: 90 TABLET ORAL at 09:37

## 2023-05-06 RX ADMIN — CITALOPRAM HYDROBROMIDE 20 MG: 20 TABLET ORAL at 09:36

## 2023-05-06 RX ADMIN — HEPARIN SODIUM 5000 UNITS: 5000 INJECTION INTRAVENOUS; SUBCUTANEOUS at 06:18

## 2023-05-06 RX ADMIN — DONEPEZIL HYDROCHLORIDE 5 MG: 5 TABLET ORAL at 17:11

## 2023-05-06 RX ADMIN — SENNOSIDES 17.2 MG: 8.6 TABLET, FILM COATED ORAL at 11:40

## 2023-05-06 RX ADMIN — AMLODIPINE BESYLATE 10 MG: 10 TABLET ORAL at 09:37

## 2023-05-06 RX ADMIN — ACETAMINOPHEN 975 MG: 325 TABLET ORAL at 15:03

## 2023-05-06 RX ADMIN — LOSARTAN POTASSIUM 50 MG: 50 TABLET, FILM COATED ORAL at 09:36

## 2023-05-06 RX ADMIN — TAMSULOSIN HYDROCHLORIDE 0.4 MG: 0.4 CAPSULE ORAL at 17:11

## 2023-05-06 RX ADMIN — PANTOPRAZOLE SODIUM 20 MG: 20 TABLET, DELAYED RELEASE ORAL at 06:19

## 2023-05-06 RX ADMIN — LIDOCAINE 5% 1 PATCH: 700 PATCH TOPICAL at 09:37

## 2023-05-06 RX ADMIN — METHOCARBAMOL 500 MG: 500 TABLET ORAL at 11:40

## 2023-05-06 RX ADMIN — ATORVASTATIN CALCIUM 40 MG: 40 TABLET, FILM COATED ORAL at 09:37

## 2023-05-06 RX ADMIN — ACETAMINOPHEN 975 MG: 325 TABLET ORAL at 06:19

## 2023-05-06 RX ADMIN — TICAGRELOR 90 MG: 90 TABLET ORAL at 21:46

## 2023-05-06 RX ADMIN — ASPIRIN 81 MG: 81 TABLET, COATED ORAL at 09:37

## 2023-05-06 RX ADMIN — SENNOSIDES AND DOCUSATE SODIUM 2 TABLET: 8.6; 5 TABLET ORAL at 17:11

## 2023-05-06 RX ADMIN — METHOCARBAMOL 500 MG: 500 TABLET ORAL at 17:11

## 2023-05-06 RX ADMIN — HYDROCODONE BITARTRATE AND ACETAMINOPHEN 1 TABLET: 5; 325 TABLET ORAL at 10:48

## 2023-05-06 RX ADMIN — DONEPEZIL HYDROCHLORIDE 5 MG: 5 TABLET ORAL at 09:37

## 2023-05-06 RX ADMIN — METHOCARBAMOL 500 MG: 500 TABLET ORAL at 06:18

## 2023-05-06 RX ADMIN — METOPROLOL SUCCINATE 100 MG: 100 TABLET, EXTENDED RELEASE ORAL at 09:36

## 2023-05-06 RX ADMIN — POLYETHYLENE GLYCOL 3350 17 G: 17 POWDER, FOR SOLUTION ORAL at 09:37

## 2023-05-06 RX ADMIN — HYDROCHLOROTHIAZIDE 25 MG: 25 TABLET ORAL at 09:37

## 2023-05-06 RX ADMIN — ACETAMINOPHEN 975 MG: 325 TABLET ORAL at 21:46

## 2023-05-06 RX ADMIN — HEPARIN SODIUM 5000 UNITS: 5000 INJECTION INTRAVENOUS; SUBCUTANEOUS at 21:46

## 2023-05-06 NOTE — RESTORATIVE TECHNICIAN NOTE
Restorative Technician Note      Patient Name: Brayden Carrillo     Note Type: Mobility  Patient Position Upon Consult: Bedside chair  Activity Performed: Ambulated; Dangled; Stood  Assistive Device: Roller walker; Other (Comment) (Assist x1 with chair follow )  Education Provided: Yes  Patient Position at End of Consult: Bedside chair;  All needs within reach; Bed/Chair alarm activated  Chetan REYNA, Restorative Technician, [employer]

## 2023-05-06 NOTE — PLAN OF CARE
Problem: SAFETY ADULT  Goal: Patient will remain free of falls  Description: INTERVENTIONS:  - Educate patient/family on patient safety including physical limitations  - Instruct patient to call for assistance with activity   - Consult OT/PT to assist with strengthening/mobility   - Keep Call bell within reach  - Keep bed low and locked with side rails adjusted as appropriate  - Keep care items and personal belongings within reach  - Initiate and maintain comfort rounds  - Make Fall Risk Sign visible to staff  - Offer Toileting every 2 Hours, in advance of need  - Initiate/Maintain bed alarm  - Obtain necessary fall risk management equipment: socks  Problem: NEUROSENSORY - ADULT  Goal: Achieves stable or improved neurological status  Description: INTERVENTIONS  - Monitor and report changes in neurological status  - Monitor vital signs such as temperature, blood pressure, glucose, and any other labs ordered   - Initiate measures to prevent increased intracranial pressure  - Monitor for seizure activity and implement precautions if appropriate      Outcome: Progressing  Goal: Achieves maximal functionality and self care  Description: INTERVENTIONS  - Monitor swallowing and airway patency with patient fatigue and changes in neurological status  - Encourage and assist patient to increase activity and self care     - Encourage visually impaired, hearing impaired and aphasic patients to use assistive/communication devices  Outcome: Progressing     - Apply yellow socks and bracelet for high fall risk patients  - Consider moving patient to room near nurses station  Outcome: Progressing

## 2023-05-06 NOTE — PROGRESS NOTES
[facility]  Progress Note  Name: Forest Darnell  MRN: [id number]  Unit/Bed#: [facility] 733-01 I Date of Admission: 4/26/2023   Date of Service: 5/6/2023 I Hospital Day: 10    Assessment/Plan   * Stroke [facility])  Assessment & Plan  Presented to the ER as a stroke alert with history of known severe R M1 stenosis, prior tobacco use, recent L MCA stroke (residual aphasia) 04/16/2023 & came in as a Stroke alert on 4/26/2023  NIHSS of 2  Initial presenting deficits were L facial droop, R sided weakness, and AMS  As an inpatient patient developed even more neuro sxs including   R homonymous hemianopsia, Rightward leaning with ambulation  · Neurology following, appreciate recommendations   · IVY on 5 2 showed EF 60% with no PFO  · MRI in 5 2 showed left parietal stroke that appeared larger  · She underwent angiogram on 5/4 to assess for vasculitis, status post left ICA stent without signs of vasculitis  · Started on Brilinta 4/27/2023  · Continue statin and aspirin  · Neurology is following and recommending for replacement of loop recorder  · Discussed with patient's wife, likely he will need assistance with eating  · ARC for rehab      Bilateral carotid artery stenosis  Assessment & Plan  · Post left ICA stent on 5/ 4  · Continue aspirin, statin and Brilinta  · Outpatient vascular surgery referral on dc    Chronic low back pain  Assessment & Plan  Reports chronic lower back pain for 40 years with acute flare x 4 weeks which has not improved despite multiple trails of steroids, nsaids and muscle relaxants from the PCP  · XR with degenerative changes in L spine  · Pt was reporting worsening low back pain, right side weakness and difficulty urinating, decreased sensation on the buttock/saddle (sitting on items in his chair and he did not notice it)  Defer MRI L spine for now, work up for stroke as above  Neuro dc the MRI L spine prefer stroke/vasculitis for etiology of sxs at this time  Can see how the work up as above goes and then re-order the MRI L spine later if the pain is still uncontrolled  · Schedule tylenol, lidocaine patch, robaxin, prn oxy   · PMR evaluating for placement  · No worsening in back pain    Chronic ischemic right MCA stroke  Assessment & Plan  · See plan above       Adjustment reaction with anxiety  Assessment & Plan  · Continue celexa   · Supportive cares    Nicotine dependence  Assessment & Plan  · Encourage cessation   · Continue nicoderm patch     Primary hypertension  Assessment & Plan  · Per wife BP has been uncontrolled outpatient, compliance has not been ideal until she started managing his medications  · Continue with metoprolol 100mg daily, losartan 50mg daily, hydralazine 25mg TID and amlodipine 10mg daily, HCTZ 25mg   · BP stable, monitor routinely     Type 2 diabetes mellitus, without long-term current use of insulin (HCC)-resolved as of 4/27/2023  Assessment & Plan  Lab Results   Component Value Date    HGBA1C 5 8 (H) 04/27/2023       Recent Labs     05/02/23  1623 05/02/23  2115 05/03/23  0625 05/03/23  1131   POCGLU 127 141* 108 135       Blood Sugar Average: Last 72 hrs:  (P) 117 [id number]   · A1c 5 8  · Hold metformin while inpatient  · Diabetic diet  · Fingerstick QID with sliding scale coverage    Urinary retention-resolved as of 5/3/2023  Assessment & Plan  · Pt reporting difficulty with urinating   · Follow urine retention protocol and PVRs  · C/w flomax         VTE Pharmacologic Prophylaxis: VTE Score: 3 High Risk (Score >/= 5) - Pharmacological DVT Prophylaxis Ordered: heparin  Sequential Compression Devices Ordered  Patient Centered Rounds: I performed bedside rounds with nursing staff today  Discussions with Specialists or Other Care Team Provider:     Education and Discussions with Family / Patient: Updated  (wife) via phone      Total Time Spent on Date of Encounter in care of patient: 55 minutes This time was spent on one or more of the following: performing physical exam; counseling and coordination of care; obtaining or reviewing history; documenting in the medical record; reviewing/ordering tests, medications or procedures; communicating with other healthcare professionals and discussing with patient's family/caregivers  Current Length of Stay: 10 day(s)  Current Patient Status: Inpatient   Certification Statement: The patient will continue to require additional inpatient hospital stay due to Management of stroke  Discharge Plan: Awaiting acute rehab placement    Code Status: Level 1 - Full Code    Subjective:   Patient seen and examined   Comfortable in bed  No chest pain or shortness of breath  Chronic low back pain    Objective:     Vitals:   Temp (24hrs), Av 1 °F (36 7 °C), Min:98 °F (36 7 °C), Max:98 1 °F (36 7 °C)    Temp:  [98 °F (36 7 °C)-98 1 °F (36 7 °C)] 98 1 °F (36 7 °C)  HR:  [74-92] 92  Resp:  [18] 18  BP: (100-142)/(53-75) 120/74  SpO2:  [95 %-96 %] 95 %  Body mass index is 34 52 kg/m²  Input and Output Summary (last 24 hours):      Intake/Output Summary (Last 24 hours) at 2023 1004  Last data filed at 2023 2100  Gross per 24 hour   Intake 620 ml   Output 250 ml   Net 370 ml       Physical Exam:   Physical Exam   Patient is awake alert in no acute distress  Expressive aphasia  Lung clear to auscultation bilateral  Heart positive S1-S2 no murmur  Abdomen soft nontender  Lower extremities no edema  Additional Data:     Labs:  Results from last 7 days   Lab Units 23  0652   WBC Thousand/uL 13 88*   HEMOGLOBIN g/dL 12 1   HEMATOCRIT % 35 7*   PLATELETS Thousands/uL 257   NEUTROS PCT % 58   LYMPHS PCT % 21   MONOS PCT % 12   EOS PCT % 7*     Results from last 7 days   Lab Units 23  0652   SODIUM mmol/L 136   POTASSIUM mmol/L 3 7   CHLORIDE mmol/L 108   CO2 mmol/L 24   BUN mg/dL 25   CREATININE mg/dL 0 77   ANION GAP mmol/L 4   CALCIUM mg/dL 9 0   GLUCOSE RANDOM mg/dL 97     Results from last 7 days   Lab Units 23  0540   INR  1 03     Results from last 7 days   Lab Units 23  0629 23  2117 23  1634 23  1129 23  0738 23  2118 23  1659 23  1235 23  0611 23  2058 23  1728 23  1131   POC GLUCOSE mg/dl 90 77 149* 110 94 122 108 129 103 143* 126 135               Lines/Drains:  Invasive Devices     Peripheral Intravenous Line  Duration           Peripheral IV 23 Left Antecubital 1 day    Peripheral IV 23 Proximal;Right;Ventral (anterior) Forearm <1 day                  Telemetry:  Telemetry Orders (From admission, onward)             48 Hour Telemetry Monitoring  Continuous x 48 hours           References:    Telemetry Guidelines   Question:  Reason for 48 Hour Telemetry  Answer:  Acute CVA (<24 hrs old, hemispheric strokes, selected brainstem strokes, cardiac arrhythmias)                 Telemetry Reviewed: yes  Indication for Continued Telemetry Use: No indication for continued use  Will discontinue                Imaging:     Recent Cultures (last 7 days):         Last 24 Hours Medication List:   Current Facility-Administered Medications   Medication Dose Route Frequency Provider Last Rate   • acetaminophen  975 mg Oral Q8H [facility] Lily Delcid, DO     • amLODIPine  10 mg Oral Daily Lily Delcid, DO     • aspirin  81 mg Oral Daily Lily Delcid, DO     • atorvastatin  40 mg Oral Daily Lily Delcid, DO     • calcium carbonate  1,000 mg Oral Daily PRN Lily Delcid DO     • citalopram  20 mg Oral Daily Lily Delcid DO     • donepezil  5 mg Oral BID Lily Delcid, DO     • heparin (porcine)  5,000 Units Subcutaneous Q8H [facility] Lily Delcid, DO     • hydrALAZINE  25 mg Oral TID Lily Delcid DO     • hydrochlorothiazide  25 mg Oral Daily Lily Delcid, DO     • HYDROcodone-acetaminophen  1 tablet Oral Q6H PRN Lily Delcid, DO     • HYDROmorphone  0 5 mg Intravenous Q4H PRN Lily Delcid DO     • insulin lispro  1-6 Units Subcutaneous TID AC Lily Delcid, DO     • insulin lispro  1-6 Units Subcutaneous HS Lily Delcid, DO     • lidocaine  1 patch Topical Daily Lily Delcid, DO     • LORazepam  1 mg Intravenous Q6H PRN Lily Delcid, DO     • losartan  50 mg Oral Daily Lily Delcid, DO     • methocarbamol  500 mg Oral Q6H [facility] Lily Delcid, DO     • metoprolol succinate  100 mg Oral Daily Lily Delcid, DO     • nicotine  14 mg Transdermal Daily Lily Delcid, DO     • ondansetron  4 mg Intravenous Q6H PRN Lily Delcid, DO     • oxyCODONE  10 mg Oral Q4H PRN Lily Delcid, DO     • oxyCODONE  5 mg Oral Q4H PRN Lily Delcid, DO     • pantoprazole  20 mg Oral Early Morning Lily Delcid, DO     • phenylephine   mcg/min Intravenous Continuous PRN Lily Delcid, DO     • polyethylene glycol  17 g Oral QAM Lily Delcid, DO     • senna  2 tablet Oral Daily PRN Lily Delcid, DO     • senna-docusate sodium  2 tablet Oral BID Diann Naranjo, DO     • tamsulosin  0 4 mg Oral Daily [employer], DO     • ticagrelor  90 mg Oral Q12H [facility] Lily Delcid, DO          Today, Patient Was Seen By: Diann Naranjo,     **Please Note: This note may have been constructed using a voice recognition system  **

## 2023-05-06 NOTE — PLAN OF CARE
Problem: SAFETY ADULT  Goal: Patient will remain free of falls  Description: INTERVENTIONS:  - Educate patient/family on patient safety including physical limitations  - Instruct patient to call for assistance with activity   - Consult OT/PT to assist with strengthening/mobility   - Keep Call bell within reach  - Keep bed low and locked with side rails adjusted as appropriate  - Keep care items and personal belongings within reach  - Initiate and maintain comfort rounds  - Make Fall Risk Sign visible to staff  - Offer Toileting every 4 Hours, in advance of need  - Initiate/Maintain bed/chairs alarm  - Obtain necessary fall risk management equipment: call bell, rolling walker  - Apply yellow socks and bracelet for high fall risk patients  - Consider moving patient to room near nurses station  Outcome: Progressing     Problem: NEUROSENSORY - ADULT  Goal: Achieves stable or improved neurological status  Description: INTERVENTIONS  - Monitor and report changes in neurological status  - Monitor vital signs such as temperature, blood pressure, glucose, and any other labs ordered   - Initiate measures to prevent increased intracranial pressure  - Monitor for seizure activity and implement precautions if appropriate    Outcome: Progressing     Problem: Nutrition/Hydration-ADULT  Goal: Nutrient/Hydration intake appropriate for improving, restoring or maintaining nutritional needs  Description: Monitor and assess patient's nutrition/hydration status for malnutrition  Collaborate with interdisciplinary team and initiate plan and interventions as ordered  Monitor patient's weight and dietary intake as ordered or per policy  Utilize nutrition screening tool and intervene as necessary  Determine patient's food preferences and provide high-protein, high-caloric foods as appropriate       INTERVENTIONS:  - Monitor oral intake, urinary output, labs, and treatment plans  - Assess nutrition and hydration status and recommend course of action  - Evaluate amount of meals eaten  - Assist patient with eating if necessary   - Allow adequate time for meals  - Recommend/ encourage appropriate diets, oral nutritional supplements, and vitamin/mineral supplements  - Order, calculate, and assess calorie counts as needed  - Recommend, monitor, and adjust tube feedings and TPN/PPN based on assessed needs  - Assess need for intravenous fluids  - Provide specific nutrition/hydration education as appropriate  - Include patient/family/caregiver in decisions related to nutrition  Outcome: Progressing

## 2023-05-06 NOTE — ARC ADMISSION
ARC continues to follow patient for medical stability, insurance authorization and bed availability  CM has been updated

## 2023-05-07 ENCOUNTER — APPOINTMENT (INPATIENT)
Dept: RADIOLOGY | Facility: HOSPITAL | Age: 63
End: 2023-05-07

## 2023-05-07 LAB
GLUCOSE SERPL-MCNC: 113 MG/DL (ref 65–140)
GLUCOSE SERPL-MCNC: 142 MG/DL (ref 65–140)
GLUCOSE SERPL-MCNC: 198 MG/DL (ref 65–140)

## 2023-05-07 RX ORDER — LORAZEPAM 2 MG/ML
1 INJECTION INTRAMUSCULAR ONCE
Status: COMPLETED | OUTPATIENT
Start: 2023-05-07 | End: 2023-05-07

## 2023-05-07 RX ORDER — METHOCARBAMOL 500 MG/1
500 TABLET, FILM COATED ORAL EVERY 6 HOURS PRN
Status: DISCONTINUED | OUTPATIENT
Start: 2023-05-07 | End: 2023-05-12 | Stop reason: HOSPADM

## 2023-05-07 RX ORDER — BISACODYL 10 MG
10 SUPPOSITORY, RECTAL RECTAL DAILY PRN
Status: DISCONTINUED | OUTPATIENT
Start: 2023-05-07 | End: 2023-05-12 | Stop reason: HOSPADM

## 2023-05-07 RX ADMIN — LORAZEPAM 1 MG: 2 INJECTION INTRAMUSCULAR; INTRAVENOUS at 16:30

## 2023-05-07 RX ADMIN — DONEPEZIL HYDROCHLORIDE 5 MG: 5 TABLET ORAL at 17:59

## 2023-05-07 RX ADMIN — METHOCARBAMOL 500 MG: 500 TABLET ORAL at 00:13

## 2023-05-07 RX ADMIN — HEPARIN SODIUM 5000 UNITS: 5000 INJECTION INTRAVENOUS; SUBCUTANEOUS at 22:26

## 2023-05-07 RX ADMIN — ATORVASTATIN CALCIUM 40 MG: 40 TABLET, FILM COATED ORAL at 08:44

## 2023-05-07 RX ADMIN — HEPARIN SODIUM 5000 UNITS: 5000 INJECTION INTRAVENOUS; SUBCUTANEOUS at 14:12

## 2023-05-07 RX ADMIN — SENNOSIDES AND DOCUSATE SODIUM 2 TABLET: 8.6; 5 TABLET ORAL at 17:59

## 2023-05-07 RX ADMIN — LOSARTAN POTASSIUM 50 MG: 50 TABLET, FILM COATED ORAL at 08:44

## 2023-05-07 RX ADMIN — TICAGRELOR 90 MG: 90 TABLET ORAL at 22:26

## 2023-05-07 RX ADMIN — HYDRALAZINE HYDROCHLORIDE 25 MG: 25 TABLET, FILM COATED ORAL at 22:26

## 2023-05-07 RX ADMIN — ACETAMINOPHEN 975 MG: 325 TABLET ORAL at 22:26

## 2023-05-07 RX ADMIN — HYDROCODONE BITARTRATE AND ACETAMINOPHEN 1 TABLET: 5; 325 TABLET ORAL at 22:26

## 2023-05-07 RX ADMIN — SENNOSIDES AND DOCUSATE SODIUM 2 TABLET: 8.6; 5 TABLET ORAL at 08:44

## 2023-05-07 RX ADMIN — METOPROLOL SUCCINATE 100 MG: 100 TABLET, EXTENDED RELEASE ORAL at 08:44

## 2023-05-07 RX ADMIN — METHOCARBAMOL 500 MG: 500 TABLET ORAL at 05:06

## 2023-05-07 RX ADMIN — DONEPEZIL HYDROCHLORIDE 5 MG: 5 TABLET ORAL at 08:44

## 2023-05-07 RX ADMIN — ASPIRIN 81 MG: 81 TABLET, COATED ORAL at 08:44

## 2023-05-07 RX ADMIN — HEPARIN SODIUM 5000 UNITS: 5000 INJECTION INTRAVENOUS; SUBCUTANEOUS at 05:06

## 2023-05-07 RX ADMIN — POLYETHYLENE GLYCOL 3350 17 G: 17 POWDER, FOR SOLUTION ORAL at 08:43

## 2023-05-07 RX ADMIN — AMLODIPINE BESYLATE 10 MG: 10 TABLET ORAL at 08:44

## 2023-05-07 RX ADMIN — HYDRALAZINE HYDROCHLORIDE 25 MG: 25 TABLET, FILM COATED ORAL at 16:29

## 2023-05-07 RX ADMIN — PANTOPRAZOLE SODIUM 20 MG: 20 TABLET, DELAYED RELEASE ORAL at 05:06

## 2023-05-07 RX ADMIN — CITALOPRAM HYDROBROMIDE 20 MG: 20 TABLET ORAL at 08:44

## 2023-05-07 RX ADMIN — TAMSULOSIN HYDROCHLORIDE 0.4 MG: 0.4 CAPSULE ORAL at 16:30

## 2023-05-07 RX ADMIN — BISACODYL 10 MG: 10 SUPPOSITORY RECTAL at 08:44

## 2023-05-07 RX ADMIN — ACETAMINOPHEN 975 MG: 325 TABLET ORAL at 05:06

## 2023-05-07 RX ADMIN — LIDOCAINE 5% 1 PATCH: 700 PATCH TOPICAL at 08:44

## 2023-05-07 RX ADMIN — HYDROCHLOROTHIAZIDE 25 MG: 25 TABLET ORAL at 08:43

## 2023-05-07 RX ADMIN — TICAGRELOR 90 MG: 90 TABLET ORAL at 08:45

## 2023-05-07 RX ADMIN — HYDRALAZINE HYDROCHLORIDE 25 MG: 25 TABLET, FILM COATED ORAL at 08:44

## 2023-05-07 NOTE — PHYSICAL THERAPY NOTE
"   Physical Therapy Treatment Note    Patient's Name: Sandhya Bazan  : 23 1024   PT Last Visit   PT Visit Date 23   Note Type   Note Type Treatment  (ARC priority)   Pain Assessment   Pain Assessment Tool 0-10   Pain Score No Pain   Restrictions/Precautions   Weight Bearing Precautions Per Order No   Other Precautions Cognitive; Chair Alarm; Bed Alarm;Telemetry; Fall Risk   General   Chart Reviewed Yes   Additional Pertinent History impaired heel-to-shin BLE, impaired finger-to-nose w/ past pointing  Response to Previous Treatment Patient with no complaints from previous session  Family/Caregiver Present No   Cognition   Following Commands Follows one step commands inconsistently   Comments pleasant + cooperative   Subjective   Subjective Pt agreeable to mobilize  With ambulation pt reports feeling \"not coordinated,\" but also stated, \"I gotta push through this  \"   Bed Mobility   Supine to Sit Unable to assess   Sit to Supine Unable to assess   Additional Comments Pt greeted on [facility]  Transfers   Sit to Stand 4  Minimal assistance   Additional items Assist x 1; Increased time required;Verbal cues;Armrests   Stand to Sit 4  Minimal assistance   Additional items Assist x 1;Verbal cues  (poor eccentric control)   Toilet transfer 4  Minimal assistance   Additional items Assist x 1; Increased time required;Verbal cues; Commode  (BSC over toilet, +BM)   Additional Comments RW, cues for hand placement   Ambulation/Elevation   Gait pattern Shuffling; Inconsistent joel; Short stride;Decreased foot clearance; Improper Weight shift; Ataxia  (short, shuffling steps)   Gait Assistance 4  Minimal assist   Additional items Assist x 2;Verbal cues; Tactile cues  (+ 3rd for chair follow for longer distances)   Assistive Device Rolling walker;None   Distance 15' w/ RW + 40'x2 w/ B HHA   Ambulation/Elevation Additional Comments Pt w/ severe forward trunk lean w/ RW + poor ability to adjust CoM over Judy with " cueing  Switched to B HHA + pt demonstrated more upright posture, however still very short, shuffling, + ataxic steps  Balance   Static Sitting Fair   Dynamic Sitting Fair -   Static Standing Poor +   Dynamic Standing Poor   Ambulatory Poor  (RW / B HHA)   Endurance Deficit   Endurance Deficit Yes   Endurance Deficit Description fatigue, TURNER (SpO2 maintained WNL,  bpm maximum w/ activity)   Activity Tolerance   Activity Tolerance Patient limited by fatigue   Medical Staff Made Aware restorative C/ [place] 23 yes - cleared for PT   Exercises   Neuro re-ed Static standing w/ RW x3 min for hygiene  Assessment   Prognosis Good   Problem List Decreased strength;Decreased endurance; Impaired balance;Decreased mobility; Decreased coordination;Decreased cognition; Impaired judgement;Decreased safety awareness   Assessment Pt seen for PT treatment session w/ focus on t/f training + gait training  Pt demonstrated improvement in sit>stand t/f compared to previous session  Pt continues to demonstrate short, shuffling, ataxic steps  He did demonstrate improvements in posture when switching from RW -> B HHA  Pt able to improve step length w/ vc for a few steps but does revert back to shuffling  Pt limited by fatigue / Lizeth Brownlee  From a PT standpoint continue to recommend rehab upon d/c    Barriers to Discharge Inaccessible home environment   Goals   Patient Goals to improve   PT Treatment Day 4   Plan   Treatment/Interventions Functional transfer training;LE strengthening/ROM; Elevations; Therapeutic exercise; Endurance training;Patient/family training;Equipment eval/education; Bed mobility;Gait training; Compensatory technique education;Spoke to nursing  (balance training)   Progress Slow progress, cognitive deficits   PT Frequency 3-5x/wk   Recommendation   PT Discharge Recommendation Post acute rehabilitation services   AM-PAC Basic Mobility Inpatient   Turning in Flat Bed Without Bedrails 3   Lying on Back to Sitting on Edge of Flat Bed Without Bedrails 3   Moving Bed to Chair 3   Standing Up From Chair Using Arms 3   Walk in Room 2   Climb 3-5 Stairs With Railing 1   Basic Mobility Inpatient Raw Score 15   Basic Mobility Standardized Score 36 97   Highest Level Of Mobility   -HLM Goal 4: Move to chair/commode   JH-HLM Achieved 7: Walk 25 feet or more   Education   Education Provided Mobility training   Patient Demonstrates acceptance/verbal understanding;Reinforcement needed   End of Consult   Patient Position at End of Consult Bedside chair;Bed/Chair alarm activated; All needs within reach  (BLE elevated)     Wilmer Nair, PT, DPT

## 2023-05-07 NOTE — RESTORATIVE TECHNICIAN NOTE
Restorative Technician Note      Patient Name: Vidya Ruff     Note Type: Mobility  Patient Position Upon Consult: Supine  Activity Performed: Ambulated; [id number]; Stood  Assistive Device: Roller walker  Education Provided: Yes  Patient Position at [employer] of Consult: Bedside chair;  All needs within reach; Bed/Chair alarm activated    Ester REYNA, Restorative Technician, [employer]

## 2023-05-07 NOTE — ASSESSMENT & PLAN NOTE
Reports chronic lower back pain for 40 years with acute flare x 4 weeks which has not improved despite multiple trails of steroids, nsaids and muscle relaxants from the PCP  · XR with degenerative changes in L spine  · Pt was reporting worsening low back pain, right side weakness and difficulty urinating, decreased sensation on the buttock/saddle (sitting on items in his chair and he did not notice it)  Neuro dc the MRI L spine prefer stroke/vasculitis for etiology of sxs at this time     · Schedule tylenol, lidocaine patch, robaxin, prn oxy   · PMR evaluating for placement  · Patient now with urinary retention, reorder lumbar spine MRI  · Continue supportive care

## 2023-05-07 NOTE — PLAN OF CARE
Problem: PAIN - ADULT  Goal: Verbalizes/displays adequate comfort level or baseline comfort level  Description: Interventions:  - Encourage patient to monitor pain and request assistance  - Assess pain using appropriate pain scale  - Administer analgesics based on type and severity of pain and evaluate response  - Implement non-pharmacological measures as appropriate and evaluate response  - Consider cultural and social influences on pain and pain management  - Notify physician/advanced practitioner if interventions unsuccessful or patient reports new pain  Outcome: Progressing     Problem: SAFETY ADULT  Goal: Patient will remain free of falls  Description: INTERVENTIONS:  - Educate patient/family on patient safety including physical limitations  - Instruct patient to call for assistance with activity   - Consult OT/PT to assist with strengthening/mobility   - Keep Call bell within reach  - Keep bed low and locked with side rails adjusted as appropriate  - Keep care items and personal belongings within reach  - Initiate and maintain comfort rounds  - Make Fall Risk Sign visible to staff  - Offer Toileting every 2 Hours, in advance of need  - Initiate/Maintain bed/chair alarms  - Obtain necessary fall risk management equipment: rolling walker, call bell  - Apply yellow socks and bracelet for high fall risk patients  - Consider moving patient to room near nurses station  Outcome: Progressing     Problem: NEUROSENSORY - ADULT  Goal: Achieves stable or improved neurological status  Description: INTERVENTIONS  - Monitor and report changes in neurological status  - Monitor vital signs such as temperature, blood pressure, glucose, and any other labs ordered   - Initiate measures to prevent increased intracranial pressure  - Monitor for seizure activity and implement precautions if appropriate    Outcome: Progressing     Problem: METABOLIC, FLUID AND ELECTROLYTES - ADULT  Goal: Glucose maintained within target range  Description: INTERVENTIONS:  - Monitor Blood Glucose as ordered  - Assess for signs and symptoms of hyperglycemia and hypoglycemia  - Administer ordered medications to maintain glucose within target range  - Assess nutritional intake and initiate nutrition service referral as needed  Outcome: Progressing     Problem: NEUROSENSORY - ADULT  Goal: Achieves maximal functionality and self care  Description: INTERVENTIONS  - Monitor swallowing and airway patency with patient fatigue and changes in neurological status  - Encourage and assist patient to increase activity and self care     - Encourage visually impaired, hearing impaired and aphasic patients to use assistive/communication devices  Outcome: Not Progressing

## 2023-05-07 NOTE — ASSESSMENT & PLAN NOTE
Lab Results   Component Value Date    HGBA1C 5 8 (H) 04/27/2023       Recent Labs     05/06/23  1056 05/06/23  1612 05/06/23 2049 05/07/23  0614   POCGLU 105 104 105 113       Blood Sugar Average: Last 72 hrs:  (P) 832 [id number]   · A1c 5 8  · Hold metformin while inpatient  · Diabetic diet  · Fingerstick QID with sliding scale coverage

## 2023-05-07 NOTE — ASSESSMENT & PLAN NOTE
Presented to the ER as a stroke alert with history of known severe R M1 stenosis, prior tobacco use, recent L MCA stroke (residual aphasia) 04/16/2023 & came in as a Stroke alert on 4/26/2023  NIHSS of 2  Initial presenting deficits were L facial droop, R sided weakness, and AMS  As an inpatient patient developed even more neuro sxs including   R homonymous hemianopsia, Rightward leaning with ambulation    · Neurology following, appreciate recommendations   · IVY on 5/2 showed EF 60% with no PFO  · MRI on 5/2 showed left parietal stroke that appeared larger  · He underwent angiogram on 5/4 to assess for vasculitis, status post left ICA stent without signs of vasculitis  · Started on Brilinta 4/27/2023  · Continue statin and aspirin  · Neurology is following and recommending for replacement of loop recorder  · ARC for rehab

## 2023-05-07 NOTE — ASSESSMENT & PLAN NOTE
· Pt reporting difficulty with urinating   · Continue with urinary retention protocol   · C/w flomax  · Check lumbar spine MRI

## 2023-05-07 NOTE — PROGRESS NOTES
[address] [facility]  Progress Note  Name: Jaylan Britton  MRN: [id number]  Unit/Bed#: [facility] 733-01 I Date of Admission: 4/26/2023   Date of Service: 5/7/2023 I Hospital Day: 11    Assessment/Plan   * Stroke [facility])  Assessment & Plan  Presented to the ER as a stroke alert with history of known severe R M1 stenosis, prior tobacco use, recent L MCA stroke (residual aphasia) 04/16/2023 & came in as a Stroke alert on 4/26/2023  NIHSS of 2  Initial presenting deficits were L facial droop, R sided weakness, and AMS  As an inpatient patient developed even more neuro sxs including   R homonymous hemianopsia, Rightward leaning with ambulation  · Neurology following, appreciate recommendations   · IVY on 5 2 showed EF 60% with no PFO  · MRI in 5 2 showed left parietal stroke that appeared larger  · He underwent angiogram on 5/4 to assess for vasculitis, status post left ICA stent without signs of vasculitis  · Started on Brilinta 4/27/2023  · Continue statin and aspirin  · Neurology is following and recommending for replacement of loop recorder  · ARC for rehab    Bilateral carotid artery stenosis  Assessment & Plan  · Post left ICA stent on 5/ 4  · Continue aspirin, statin and Brilinta  · Outpatient vascular surgery referral on dc    Chronic low back pain  Assessment & Plan  Reports chronic lower back pain for 40 years with acute flare x 4 weeks which has not improved despite multiple trails of steroids, nsaids and muscle relaxants from the PCP  · XR with degenerative changes in L spine  · Pt was reporting worsening low back pain, right side weakness and difficulty urinating, decreased sensation on the buttock/saddle (sitting on items in his chair and he did not notice it)  Neuro dc the MRI L spine prefer stroke/vasculitis for etiology of sxs at this time     · Schedule tylenol, lidocaine patch, robaxin, prn oxy   · PMR evaluating for placement  · Patient now with urinary retention, reorder lumbar spine MRI  · Continue supportive care    Chronic ischemic right MCA stroke  Assessment & Plan  · Continue aspirin and statin  · See plan above     Type 2 diabetes mellitus [facility])  Assessment & Plan  Lab Results   Component Value Date    HGBA1C 5 8 (H) 04/27/2023       Recent Labs     05/06/23  1056 05/06/23  1612 05/06/23  2049 05/07/23  0614   POCGLU 105 104 105 113       Blood Sugar Average: Last 72 hrs:  (P) 471 [id number]   · A1c 5 8  · Hold metformin while inpatient  · Diabetic diet  · Fingerstick QID with sliding scale coverage    Adjustment reaction with anxiety  Assessment & Plan  · Continue celexa   · Supportive cares    Urinary retention  Assessment & Plan  · Pt reporting difficulty with urinating   · Continue with urinary retention protocol   · C/w flomax  · Check lumbar spine MRI    Nicotine dependence  Assessment & Plan  · Encourage cessation   · Continue nicoderm patch     Primary hypertension  Assessment & Plan  · Per wife BP has been uncontrolled outpatient, compliance has not been ideal until she started managing his medications  · Continue with metoprolol 100mg daily, losartan 50mg daily, hydralazine 25mg TID and amlodipine 10mg daily, HCTZ 25mg   · BP stable, monitor routinely            VTE Pharmacologic Prophylaxis: VTE Score: 3 Moderate Risk (Score 3-4) - Pharmacological DVT Prophylaxis Ordered: heparin  Patient Centered Rounds: I performed bedside rounds with nursing staff today    Discussions with Specialists or Other Care Team Provider:     Discussed with patient's wife at bedside    Total Time Spent on Date of Encounter in care of patient: 54 minutes This time was spent on one or more of the following: performing physical exam; counseling and coordination of care; obtaining or reviewing history; documenting in the medical record; reviewing/ordering tests, medications or procedures; communicating with other healthcare professionals and discussing with patient's family/caregivers  Current Length of Stay: 11 day(s)  Current Patient Status: Inpatient   Certification Statement: The patient will continue to require additional inpatient hospital stay due to Awaiting rehab placement      Code Status: Level 1 - Full Code    Subjective:   Patient seen and examined  Comfortable in chair  Continues to have urinary retention required 3 straight cath  Continue to have low back pain  Still with ambulatory dysfunction    Objective:     Vitals:   Temp (24hrs), Av 2 °F (36 8 °C), Min:98 °F (36 7 °C), Max:98 4 °F (36 9 °C)    Temp:  [98 °F (36 7 °C)-98 4 °F (36 9 °C)] 98 3 °F (36 8 °C)  HR:  [60-76] 60  Resp:  [16] 16  BP: (109-126)/(58-64) 126/64  SpO2:  [92 %-96 %] 92 %  Body mass index is 34 52 kg/m²  Input and Output Summary (last 24 hours):      Intake/Output Summary (Last 24 hours) at 2023 1103  Last data filed at 2023 0842  Gross per 24 hour   Intake 720 ml   Output 1277 ml   Net -557 ml       Physical Exam:   Physical Exam   Patient is awake alert in no acute distress  Expressive aphasia  Lung clear to auscultation bilateral  Heart positive S1-S2 no murmur  Abdomen soft nontender  Lower extremities no edema    Additional Data:     Labs:  Results from last 7 days   Lab Units 23  0652   WBC Thousand/uL 13 88*   HEMOGLOBIN g/dL 12 1   HEMATOCRIT % 35 7*   PLATELETS Thousands/uL 257   NEUTROS PCT % 58   LYMPHS PCT % 21   MONOS PCT % 12   EOS PCT % 7*     Results from last 7 days   Lab Units 23  0652   SODIUM mmol/L 136   POTASSIUM mmol/L 3 7   CHLORIDE mmol/L 108   CO2 mmol/L 24   BUN mg/dL 25   CREATININE mg/dL 0 77   ANION GAP mmol/L 4   CALCIUM mg/dL 9 0   GLUCOSE RANDOM mg/dL 97     Results from last 7 days   Lab Units 23  0540   INR  1 03     Results from last 7 days   Lab Units 23  1827 23  2049 23  1612 23  1056 23  0629 23  2117 23  1634 23  1129 23  0738 23  2118 23  165 23  1235   POC GLUCOSE mg/dl 113 105 104 105 90 77 149* 110 94 122 108 129               Lines/Drains:  Invasive Devices     Peripheral Intravenous Line  Duration           Peripheral IV 23 Left Antecubital 3 days    Peripheral IV 23 Proximal;Right;Ventral (anterior) Forearm 1 day                  Telemetry:  Telemetry Orders (From admission, onward)             48 Hour Telemetry Monitoring  Continuous x 48 hours           References:    Telemetry Guidelines   Question:  Reason for 48 Hour Telemetry  Answer:  Acute CVA (<24 hrs old, hemispheric strokes, selected brainstem strokes, cardiac arrhythmias)                 Telemetry Reviewed: yes  Indication for Continued Telemetry Use: No indication for continued use  Will discontinue  Imaging: No pertinent imaging reviewed      Recent Cultures (last 7 days):         Last 24 Hours Medication List:   Current Facility-Administered Medications   Medication Dose Route Frequency Provider Last Rate   • acetaminophen  975 mg Oral Q8H [address] Lily Delcid, DO     • amLODIPine  10 mg Oral Daily Lily Delcid, DO     • aspirin  81 mg Oral Daily Lily Delcid, DO     • atorvastatin  40 mg Oral Daily Lily Delcid, DO     • bisacodyl  10 mg Rectal Daily PRN Sloane Alberto, DO     • calcium carbonate  1,000 mg Oral Daily PRN Lily Delcid, DO     • citalopram  20 mg Oral Daily Lily Delcid, DO     • donepezil  5 mg Oral BID Lily Delcid, DO     • heparin (porcine)  5,000 Units Subcutaneous Q8H [address] Lily Delcid, DO     • hydrALAZINE  25 mg Oral TID Lily Delcid, DO     • hydrochlorothiazide  25 mg Oral Daily Lily Delcid, DO     • HYDROcodone-acetaminophen  1 tablet Oral Q6H PRN Lily Delcid, DO     • HYDROmorphone  0 5 mg Intravenous Q4H PRN Lily Delcid, DO     • insulin lispro  1-6 Units Subcutaneous TID AC Lily Delcid, DO     • insulin lispro  1-6 Units Subcutaneous HS Lily Delcid, DO     • lidocaine  1 patch Topical Daily Lily Delcid, DO     • LORazepam  1 mg Intravenous Q6H PRN Lily Delcid, DO     • losartan  50 mg Oral Daily Lily Delcid, DO     • methocarbamol  500 mg Oral Q6H PRN Diann Naranjo, DO     • metoprolol succinate  100 mg Oral Daily Lily Delcid, DO     • ondansetron  4 mg Intravenous Q6H PRN Lily Delcid, DO     • oxyCODONE  10 mg Oral Q4H PRN Lily Delcid, DO     • oxyCODONE  5 mg Oral Q4H PRN Lily Delcid, DO     • pantoprazole  20 mg Oral Early Morning Lily Delcid, DO     • phenylephine   mcg/min Intravenous Continuous PRN Lily Delcid, DO     • polyethylene glycol  17 g Oral QAM Lily Delcid, DO     • senna  2 tablet Oral Daily PRN Lily Delcid, DO     • senna-docusate sodium  2 tablet Oral BID Diann Naranjo, DO     • tamsulosin  0 4 mg Oral Daily With [employer], DO     • ticagrelor  90 mg Oral Q12H [facility] Lily Delcid, DO          Today, Patient Was Seen By: Diann Naranjo, DO    **Please Note: This note may have been constructed using a voice recognition system  **

## 2023-05-07 NOTE — ASSESSMENT & PLAN NOTE
Presented to the ER as a stroke alert with history of known severe R M1 stenosis, prior tobacco use, recent L MCA stroke (residual aphasia) 04/16/2023 & came in as a Stroke alert on 4/26/2023  NIHSS of 2  Initial presenting deficits were L facial droop, R sided weakness, and AMS  As an inpatient patient developed even more neuro sxs including   R homonymous hemianopsia, Rightward leaning with ambulation    · Neurology following, appreciate recommendations   · IVY on 5 2 showed EF 60% with no PFO  · MRI in 5 2 showed left parietal stroke that appeared larger  · He underwent angiogram on 5/4 to assess for vasculitis, status post left ICA stent without signs of vasculitis  · Started on Brilinta 4/27/2023  · Continue statin and aspirin  · Neurology is following and recommending for replacement of loop recorder  · ARC for rehab

## 2023-05-07 NOTE — RESTORATIVE TECHNICIAN NOTE
Restorative Technician Note      Patient Name: Carlos Velasquez     Note Type: Mobility  Patient Position Upon Consult: Bedside chair  Activity Performed: Ambulated; Dangled; Stood  Assistive Device: Roller walker; Other (Comment) (Assist x2)  Education Provided: Yes  Patient Position at End of Consult: Bedside chair;  All needs within reach; Bed/Chair alarm activated    [facility] VALERIO REYNA, Restorative Technician, [employer]

## 2023-05-07 NOTE — PLAN OF CARE
Problem: MOBILITY - ADULT  Goal: Maintain or return to baseline ADL function  Description: INTERVENTIONS:  -  Assess patient's ability to carry out ADLs; assess patient's baseline for ADL function and identify physical deficits which impact ability to perform ADLs (bathing, care of mouth/teeth, toileting, grooming, dressing, etc )  - Assess/evaluate cause of self-care deficits   - Assess range of motion  - Assess patient's mobility; develop plan if impaired  - Assess patient's need for assistive devices and provide as appropriate  - Encourage maximum independence but intervene and supervise when necessary  - Involve family in performance of ADLs  - Assess for home care needs following discharge   - Consider OT consult to assist with ADL evaluation and planning for discharge  - Provide patient education as appropriate  Outcome: Progressing  Goal: Maintains/Returns to pre admission functional level  Description: INTERVENTIONS:  - Perform BMAT or MOVE assessment daily    - Set and communicate daily mobility goal to care team and patient/family/caregiver  - Collaborate with rehabilitation services on mobility goals if consulted  - Perform Range of Motion 3 times a day  - Reposition patient every 3 hours  - Dangle patient 3 times a day  - Stand patient 3 times a day  - Ambulate patient 3 times a day  - Out of bed to chair 3 times a day   - Out of bed for meals 3 times a day  - Out of bed for toileting  - Record patient progress and toleration of activity level   Outcome: Progressing     Problem: Nutrition/Hydration-ADULT  Goal: Nutrient/Hydration intake appropriate for improving, restoring or maintaining nutritional needs  Description: Monitor and assess patient's nutrition/hydration status for malnutrition  Collaborate with interdisciplinary team and initiate plan and interventions as ordered  Monitor patient's weight and dietary intake as ordered or per policy   Utilize nutrition screening tool and intervene as necessary  Determine patient's food preferences and provide high-protein, high-caloric foods as appropriate       INTERVENTIONS:  - Monitor oral intake, urinary output, labs, and treatment plans  - Assess nutrition and hydration status and recommend course of action  - Evaluate amount of meals eaten  - Assist patient with eating if necessary   - Allow adequate time for meals  - Recommend/ encourage appropriate diets, oral nutritional supplements, and vitamin/mineral supplements  - Order, calculate, and assess calorie counts as needed  - Recommend, monitor, and adjust tube feedings and TPN/PPN based on assessed needs  - Assess need for intravenous fluids  - Provide specific nutrition/hydration education as appropriate  - Include patient/family/caregiver in decisions related to nutrition  Outcome: Progressing

## 2023-05-07 NOTE — PLAN OF CARE
Problem: PHYSICAL THERAPY ADULT  Goal: Performs mobility at highest level of function for planned discharge setting  See evaluation for individualized goals  Description:    Equipment Recommended:  (TBD)       See flowsheet documentation for full assessment, interventions and recommendations  Outcome: Progressing  Note: Prognosis: Good  Problem List: Decreased strength, Decreased endurance, Impaired balance, Decreased mobility, Decreased coordination, Decreased cognition, Impaired judgement, Decreased safety awareness  Assessment: Pt seen for PT treatment session w/ focus on t/f training + gait training  Pt demonstrated improvement in sit>stand t/f compared to previous session  Pt continues to demonstrate short, shuffling, ataxic steps  He did demonstrate improvements in posture when switching from RW -> B HHA  Pt able to improve step length w/ vc for a few steps but does revert back to shuffling  Pt limited by fatigue / Dimple Senters  From a PT standpoint continue to recommend rehab upon d/c   Barriers to Discharge: Inaccessible home environment     PT Discharge Recommendation: Post acute rehabilitation services    See flowsheet documentation for full assessment

## 2023-05-08 PROBLEM — I71.40 ABDOMINAL AORTIC ANEURYSM (AAA) (HCC): Status: ACTIVE | Noted: 2023-05-08

## 2023-05-08 LAB
BILIRUB UR QL STRIP: NEGATIVE
CLARITY UR: CLEAR
COLOR UR: YELLOW
GLUCOSE SERPL-MCNC: 112 MG/DL (ref 65–140)
GLUCOSE UR STRIP-MCNC: ABNORMAL MG/DL
HGB UR QL STRIP.AUTO: NEGATIVE
KETONES UR STRIP-MCNC: NEGATIVE MG/DL
LEUKOCYTE ESTERASE UR QL STRIP: NEGATIVE
NITRITE UR QL STRIP: NEGATIVE
PH UR STRIP.AUTO: 6.5 [PH]
PROT UR STRIP-MCNC: NEGATIVE MG/DL
SP GR UR STRIP.AUTO: 1.01 (ref 1–1.03)
UROBILINOGEN UR STRIP-ACNC: 2 MG/DL

## 2023-05-08 RX ADMIN — TICAGRELOR 90 MG: 90 TABLET ORAL at 21:35

## 2023-05-08 RX ADMIN — METOPROLOL SUCCINATE 100 MG: 100 TABLET, EXTENDED RELEASE ORAL at 09:44

## 2023-05-08 RX ADMIN — AMLODIPINE BESYLATE 10 MG: 10 TABLET ORAL at 09:43

## 2023-05-08 RX ADMIN — HEPARIN SODIUM 5000 UNITS: 5000 INJECTION INTRAVENOUS; SUBCUTANEOUS at 15:00

## 2023-05-08 RX ADMIN — ACETAMINOPHEN 975 MG: 325 TABLET ORAL at 06:17

## 2023-05-08 RX ADMIN — DONEPEZIL HYDROCHLORIDE 5 MG: 5 TABLET ORAL at 09:43

## 2023-05-08 RX ADMIN — HYDROCHLOROTHIAZIDE 25 MG: 25 TABLET ORAL at 09:44

## 2023-05-08 RX ADMIN — HYDRALAZINE HYDROCHLORIDE 25 MG: 25 TABLET, FILM COATED ORAL at 09:43

## 2023-05-08 RX ADMIN — SENNOSIDES AND DOCUSATE SODIUM 2 TABLET: 8.6; 5 TABLET ORAL at 18:05

## 2023-05-08 RX ADMIN — OXYCODONE HYDROCHLORIDE 10 MG: 10 TABLET ORAL at 12:15

## 2023-05-08 RX ADMIN — ASPIRIN 81 MG: 81 TABLET, COATED ORAL at 09:43

## 2023-05-08 RX ADMIN — HYDRALAZINE HYDROCHLORIDE 25 MG: 25 TABLET, FILM COATED ORAL at 18:05

## 2023-05-08 RX ADMIN — OXYCODONE HYDROCHLORIDE 10 MG: 10 TABLET ORAL at 20:10

## 2023-05-08 RX ADMIN — HEPARIN SODIUM 5000 UNITS: 5000 INJECTION INTRAVENOUS; SUBCUTANEOUS at 21:34

## 2023-05-08 RX ADMIN — TAMSULOSIN HYDROCHLORIDE 0.4 MG: 0.4 CAPSULE ORAL at 18:05

## 2023-05-08 RX ADMIN — OXYCODONE HYDROCHLORIDE 10 MG: 10 TABLET ORAL at 08:04

## 2023-05-08 RX ADMIN — DONEPEZIL HYDROCHLORIDE 5 MG: 5 TABLET ORAL at 18:05

## 2023-05-08 RX ADMIN — LIDOCAINE 5% 1 PATCH: 700 PATCH TOPICAL at 09:45

## 2023-05-08 RX ADMIN — PANTOPRAZOLE SODIUM 20 MG: 20 TABLET, DELAYED RELEASE ORAL at 06:17

## 2023-05-08 RX ADMIN — CITALOPRAM HYDROBROMIDE 20 MG: 20 TABLET ORAL at 09:43

## 2023-05-08 RX ADMIN — ATORVASTATIN CALCIUM 40 MG: 40 TABLET, FILM COATED ORAL at 09:43

## 2023-05-08 RX ADMIN — HYDROMORPHONE HYDROCHLORIDE 0.5 MG: 1 INJECTION, SOLUTION INTRAMUSCULAR; INTRAVENOUS; SUBCUTANEOUS at 09:44

## 2023-05-08 RX ADMIN — HYDRALAZINE HYDROCHLORIDE 25 MG: 25 TABLET, FILM COATED ORAL at 21:34

## 2023-05-08 RX ADMIN — OXYCODONE HYDROCHLORIDE 10 MG: 10 TABLET ORAL at 16:21

## 2023-05-08 RX ADMIN — HYDROMORPHONE HYDROCHLORIDE 0.5 MG: 1 INJECTION, SOLUTION INTRAMUSCULAR; INTRAVENOUS; SUBCUTANEOUS at 14:59

## 2023-05-08 RX ADMIN — ACETAMINOPHEN 975 MG: 325 TABLET ORAL at 21:34

## 2023-05-08 RX ADMIN — TICAGRELOR 90 MG: 90 TABLET ORAL at 11:49

## 2023-05-08 RX ADMIN — HEPARIN SODIUM 5000 UNITS: 5000 INJECTION INTRAVENOUS; SUBCUTANEOUS at 06:17

## 2023-05-08 RX ADMIN — LOSARTAN POTASSIUM 50 MG: 50 TABLET, FILM COATED ORAL at 09:44

## 2023-05-08 NOTE — SPEECH THERAPY NOTE
Speech Language/Pathology    Speech/Language Pathology Progress Note    Patient Name: Lou Grayson  [id number] Date: 5/8/2023     Problem List  Principal Problem:    Stroke [facility])  Active Problems:    Primary hypertension    Nicotine dependence    Bilateral carotid artery stenosis    Urinary retention    Adjustment reaction with anxiety    Type 2 diabetes mellitus (HCC)    Chronic ischemic right MCA stroke    Chronic low back pain    Hyponatremia    Prediabetes    Tachycardia       Past Medical History  Past Medical History:   Diagnosis Date   • Depression    • Diabetes mellitus ([address] )    • Dyslipidemia 03/26/2019   • Hyperlipidemia    • Hypertension    • Stroke [facility])         Past Surgical History  Past Surgical History:   Procedure Laterality Date   • BACK SURGERY     • IR CEREBRAL ANGIOGRAPHY  5/4/2023   • IR STROKE ALERT  3/19/2019   • SHOULDER SURGERY           Subjective:  Pt awake and alert  OOB in chair  Objective:  Pt seen for ongoing tx of expressive language disorder  Pt seen w/ word finding and writing activities  Verbal expression included more phonemic paraphasias today then previous sessions  Pt was able to name numbers independently and write out numbers w/ 1-10 written as model at top of paper  Pt able to name colors when given max verbal cues (what color is this    gr )  Pt able to write colors w/ about 50% accuracy (some illegible letters, some missing letters)  Writing overall is improved from previous sessions  Independent writing accuracy about 30%, when given model improves to 60%  Word finding appears to influence writing/ lexicon recall  Assessment:  Pt showing improvements in writing abilities w/ more independent accuracy and improved ability to copy w/ model  Word finding continues to aided by verbal cues  Conversational speech is more fluent w/ less word finding difficulties       Plan/Recommendations:  Continue targeting word finding/writing   Use of models for writing may be beneficial  ST to f/u

## 2023-05-08 NOTE — ASSESSMENT & PLAN NOTE
Lab Results   Component Value Date    HGBA1C 5 8 (H) 04/27/2023       Recent Labs     05/06/23  2049 05/07/23  0614 05/07/23  1053 05/07/23  1521   POCGLU 105 113 142* 198*       Blood Sugar Average: Last 72 hrs:  (P) 117   · A1c 5 8  · Hold metformin while inpatient  · Diabetic diet  · Patient's wife does not want blood sugar check, because she said he was diagnosed with prediabetes

## 2023-05-08 NOTE — PROGRESS NOTES
[address] [facility]  Progress Note  Name: Lio Steven  MRN: [id number]  Unit/Bed#: [facility]PHP 733-01 I Date of Admission: 4/26/2023   Date of Service: 5/8/2023 I Hospital Day: 12    Assessment/Plan   * Stroke [facility])  Assessment & Plan  Presented to the ER as a stroke alert with history of known severe R M1 stenosis, prior tobacco use, recent L MCA stroke (residual aphasia) 04/16/2023 & came in as a Stroke alert on 4/26/2023  NIHSS of 2  Initial presenting deficits were L facial droop, R sided weakness, and AMS  As an inpatient patient developed even more neuro sxs including   R homonymous hemianopsia, Rightward leaning with ambulation  · Neurology following, appreciate recommendations   · IVY on 5/2 showed EF 60% with no PFO  · MRI on 5/2 showed left parietal stroke that appeared larger  · He underwent angiogram on 5/4 to assess for vasculitis, status post left ICA stent without signs of vasculitis  · Started on Brilinta 4/27/2023  · Continue statin and aspirin  · Neurology is following and recommending for replacement of loop recorder  · ARC for rehab    Bilateral carotid artery stenosis  Assessment & Plan  · Post left ICA stent on 5/ 4  · Continue aspirin, statin and Brilinta  · Outpatient vascular surgery referral on dc    Chronic low back pain  Assessment & Plan  Reports chronic lower back pain for 40 years with acute flare x 4 weeks which has not improved despite multiple trails of steroids, nsaids and muscle relaxants from the PCP  · XR with degenerative changes in L spine  · Spine MRI advanced multilevel spondylosis, slightly progressed on the right at L2-3 compared to the prior study but otherwise stable     · Patient reports that his back pain is better  · Schedule tylenol, lidocaine patch, robaxin, prn oxy   · PMR evaluating for ARC placement  · Continue supportive care    Chronic ischemic right MCA stroke  Assessment & Plan  · Continue aspirin and statin  · See plan above Abdominal aortic aneurysm (AAA) [facility])  Assessment & Plan  Dental finding of 3 4 cm fusiform infrarenal abdominal aortic aneurysm redemonstrated  Surveillance abdominal ultrasound in 6 to 12 months advised  Prediabetes  Assessment & Plan  · A1c 5 8  · Currently holding metformin while hospitalized, resume on discharge   · Carb controlled diet     Type 2 diabetes mellitus [facility])  Assessment & Plan  Lab Results   Component Value Date    HGBA1C 5 8 (H) 04/27/2023       Recent Labs     05/06/23  2049 05/07/23  0614 05/07/23  1053 05/07/23  1521   POCGLU 105 113 142* 198*       Blood Sugar Average: Last 72 hrs:  (P) 117   · A1c 5 8  · Hold metformin while inpatient  · Diabetic diet  · Patient's wife does not want blood sugar check, because she said he was diagnosed with prediabetes    Adjustment reaction with anxiety  Assessment & Plan  · Continue celexa   · Supportive cares    Urinary retention  Assessment & Plan  · Lumbar spine MRI with Advanced multilevel spondylosis, slightly progressed on the right at L2-3 compared to the prior study but otherwise stable  · Difficulty placing Cortez catheter per nursing  · Now with overflow incontinence  · Continue with urinary retention protocol   · C/w flomax    Nicotine dependence  Assessment & Plan  · Encourage cessation   · Continue nicoderm patch     Primary hypertension  Assessment & Plan  · Per wife BP has been uncontrolled outpatient, compliance has not been ideal until she started managing his medications  · Continue with metoprolol 100mg daily, losartan 50mg daily, hydralazine 25mg TID and amlodipine 10mg daily, HCTZ 25mg   · BP stable, monitor routinely            VTE Pharmacologic Prophylaxis: VTE Score: 3 Moderate Risk (Score 3-4) - Pharmacological DVT Prophylaxis Ordered: heparin  Patient Centered Rounds: I performed bedside rounds with nursing staff today    Discussions with Specialists or Other Care Team Provider:     Education and Discussions with Family / Patient: Updated  (wife) at bedside  Total Time Spent on Date of Encounter in care of patient: 55 minutes This time was spent on one or more of the following: performing physical exam; counseling and coordination of care; obtaining or reviewing history; documenting in the medical record; reviewing/ordering tests, medications or procedures; communicating with other healthcare professionals and discussing with patient's family/caregivers  Current Length of Stay: 12 day(s)  Current Patient Status: Inpatient   Certification Statement: The patient will continue to require additional inpatient hospital stay due to Awaiting rehab placement at [facility]      Code Status: Level 1 - Full Code    Subjective:   Patient is comfortable in chair  Difficulty placing urinary catheter  However patient able to empty his bladder  No chest pain or shortness of breath    Objective:     Vitals:   Temp (24hrs), Av 3 °F (36 8 °C), Min:98 °F (36 7 °C), Max:98 7 °F (37 1 °C)    Temp:  [98 °F (36 7 °C)-98 7 °F (37 1 °C)] 98 °F (36 7 °C)  HR:  [76-80] 80  Resp:  [18] 18  BP: (112-120)/(57-63) 120/60  SpO2:  [93 %-96 %] 96 %  Body mass index is 34 52 kg/m²  Input and Output Summary (last 24 hours):      Intake/Output Summary (Last 24 hours) at 2023 1444  Last data filed at 2023 2101  Gross per 24 hour   Intake 240 ml   Output 700 ml   Net -460 ml       Physical Exam:   Physical Exam   Patient is awake alert in no acute distress  Expressive aphasia  Lung clear to auscultation bilateral  Heart positive S1-S2 no murmur  Abdomen soft nontender  Lower extremities no edema  Additional Data:     Labs:  Results from last 7 days   Lab Units 23  0652   WBC Thousand/uL 13 88*   HEMOGLOBIN g/dL 12 1   HEMATOCRIT % 35 7*   PLATELETS Thousands/uL 257   NEUTROS PCT % 58   LYMPHS PCT % 21   MONOS PCT % 12   EOS PCT % 7*     Results from last 7 days   Lab Units 23  0652   SODIUM mmol/L 136   POTASSIUM mmol/L 3 7 CHLORIDE mmol/L 108   CO2 mmol/L 24   BUN mg/dL 25   CREATININE mg/dL 0 77   ANION GAP mmol/L 4   CALCIUM mg/dL 9 0   GLUCOSE RANDOM mg/dL 97     Results from last 7 days   Lab Units 05/05/23  0540   INR  1 03     Results from last 7 days   Lab Units 05/07/23  1521 05/07/23  1053 05/07/23  0614 05/06/23  2049 05/06/23  1612 05/06/23  1056 05/06/23  0629 05/05/23  2117 05/05/23  1634 05/05/23  1129 05/05/23  0738 05/04/23  2118   POC GLUCOSE mg/dl 198* 142* 113 105 104 105 90 77 149* 110 94 122               Lines/Drains:  Invasive Devices     Peripheral Intravenous Line  Duration           Peripheral IV 05/05/23 Proximal;Right;Ventral (anterior) Forearm 2 days                      Imaging: Reviewed    Recent Cultures (last 7 days):         Last 24 Hours Medication List:   Current Facility-Administered Medications   Medication Dose Route Frequency Provider Last Rate   • acetaminophen  975 mg Oral Q8H [address] Brittneerijacques Julienatt, DO     • amLODIPine  10 mg Oral Daily Zarian Anayaatt, DO     • aspirin  81 mg Oral Daily Zarijacques Julienatt, DO     • atorvastatin  40 mg Oral Daily Zarian Farhana, DO     • bisacodyl  10 mg Rectal Daily PRN Ramón Ash, DO     • calcium carbonate  1,000 mg Oral Daily PRN Brittneerian Anayaatt, DO     • citalopram  20 mg Oral Daily Zarian Anayaatt, DO     • donepezil  5 mg Oral BID Zarijacques Delcid, DO     • heparin (porcine)  5,000 Units Subcutaneous Q8H [address] Brittneerian Anayaatt, DO     • hydrALAZINE  25 mg Oral TID Zarian Anayaatt, DO     • hydrochlorothiazide  25 mg Oral Daily Zarian Anayaatt, DO     • HYDROcodone-acetaminophen  1 tablet Oral Q6H PRN Brittneerijacques Julienatt, DO     • HYDROmorphone  0 5 mg Intravenous Q4H PRN Brittneerian Anayaatt, DO     • lidocaine  1 patch Topical Daily Zarijacques Julienatt, DO     • LORazepam  1 mg Intravenous Q6H PRN Brittneerijacques Julienatt, DO     • losartan  50 mg Oral Daily Zarian Anayaatt, DO     • methocarbamol  500 mg Oral Q6H PRN Ramón Ash DO     • metoprolol succinate  100 mg Oral Daily Lily Farhana, DO     • ondansetron  4 mg Intravenous Q6H PRN Lily Delcid, DO     • oxyCODONE  10 mg Oral Q4H PRN Lily Delcid, DO     • oxyCODONE  5 mg Oral Q4H PRN Lily Delcid, DO     • pantoprazole  20 mg Oral Early Morning Lily Delcid, DO     • phenylephine   mcg/min Intravenous Continuous PRN Lily Delcid, DO     • polyethylene glycol  17 g Oral QAM Lily Delcid, DO     • senna  2 tablet Oral Daily PRN Lily Delcid, DO     • senna-docusate sodium  2 tablet Oral BID Lendon Lesches,      • tamsulosin  0 4 mg Oral Daily With [employer], DO     • ticagrelor  90 mg Oral Q12H [address] Lily Delcid,           Today, Patient Was Seen By: Lendon Lesches, DO    **Please Note: This note may have been constructed using a voice recognition system  **

## 2023-05-08 NOTE — PLAN OF CARE
Problem: PAIN - ADULT  Goal: Verbalizes/displays adequate comfort level or baseline comfort level  Description: Interventions:  - Encourage patient to monitor pain and request assistance  - Assess pain using appropriate pain scale  - Administer analgesics based on type and severity of pain and evaluate response  - Implement non-pharmacological measures as appropriate and evaluate response  - Consider cultural and social influences on pain and pain management  - Notify physician/advanced practitioner if interventions unsuccessful or patient reports new pain  Outcome: Progressing     Problem: INFECTION - ADULT  Goal: Absence or prevention of progression during hospitalization  Description: INTERVENTIONS:  - Assess and monitor for signs and symptoms of infection  - Monitor lab/diagnostic results  - Monitor all insertion sites, i e  indwelling lines, tubes, and drains  - Monitor endotracheal if appropriate and nasal secretions for changes in amount and color  - Hinkley appropriate cooling/warming therapies per order  - Administer medications as ordered  - Instruct and encourage patient and family to use good hand hygiene technique  - Identify and instruct in appropriate isolation precautions for identified infection/condition  Outcome: Progressing

## 2023-05-08 NOTE — ASSESSMENT & PLAN NOTE
· Lumbar spine MRI with Advanced multilevel spondylosis, slightly progressed on the right at L2-3 compared to the prior study but otherwise stable    · Difficulty placing Cortez catheter per nursing  · Now with overflow incontinence  · Continue with urinary retention protocol   · C/w flomax

## 2023-05-08 NOTE — ARC ADMISSION
Patient re-reviewed with [facility] physician this morning  ARC will continue to follow for medical stability, insurance authorization and bed availability  CM has been updated

## 2023-05-08 NOTE — PLAN OF CARE
Problem: OCCUPATIONAL THERAPY ADULT  Goal: Performs self-care activities at highest level of function for planned discharge setting  See evaluation for individualized goals  Description: Treatment Interventions: ADL retraining, Functional transfer training, UE strengthening/ROM, Endurance training, Cognitive reorientation, Patient/family training, Equipment evaluation/education, Compensatory technique education, Neuromuscular reeducation, Fine motor coordination activities, Continued evaluation, Energy conservation, Activityengagement          See flowsheet documentation for full assessment, interventions and recommendations  Outcome: Progressing  Note: Limitation: Decreased ADL status, Decreased cognition, Decreased endurance, Decreased Safe judgement during ADL, Decreased fine motor control, Decreased self-care trans, Decreased high-level ADLs, Decreased sensation  Prognosis: Fair  Assessment: Pt is a 59 yo male who actively participated in skilled OT session on 5/8/2023  Treatment focused to improve functional transfers with fall prevention strategies, static/dynamic balance, postural/trunk control, proper body mechanics, functional use of b/l UE's, higher level cognitive functions, safety awareness, and overall increased activity tolerance in ADL/IADL/leisure tasks  Upon arrival, pt found sitting upright in recliner and was agreeable to OT session  Pt performed seated UB dressing/bathing tasks with Min A for proper technique/sequencing and LB dressing/bathing tasks with Mod A 2* decreased functional reach  Pt requiring verbal cues to attend to R side @ times as observed pt perservating on washing LE UE  Pt performed STS with Min A x1 w/ RW for safety and support and stood for approx 2 minutes to complete standing grooming tasks, demonstrating poor+ dynamic balance during grooming tasks   Pt completed few short steps with Mod A x1 w/ RW for safety and support however observed pt pushing RW into tray table, demonstrating decreased safety awareness and command following  At the end of the session, pt was left sitting upright in recliner with all functional needs in reach with chair alarm activated  Pt demonstrates gradual functional improvements towards OT goals however continues to be functioning below occupational baseline and is still limited by the following limitations/impairments which were addressed through skilled instruction: generalized weakness, balance, functional ROM, cognition, endurance/activity tolerance, postural/trunk control, strength, pain, and safety awareness  At this time, recommend discharge to post-acute rehab when medically stable  The patient's raw score on the -PAC Daily Activity Inpatient Short Form is 14  A raw score of less than 19 suggests the patient may benefit from discharge to post-acute rehabilitation services  Please refer to the recommendation of the Occupational Therapist for safe discharge planning  Established OT goals will be continued 3-5x/wk to address immediate acute care needs and underlying performance skills to maximize occupational performance and safety to return to PLOF    Recommendation: Speech consult (language/ cog)  OT Discharge Recommendation: Post acute rehabilitation services

## 2023-05-08 NOTE — ASSESSMENT & PLAN NOTE
Dental finding of 3 4 cm fusiform infrarenal abdominal aortic aneurysm redemonstrated  Surveillance abdominal ultrasound in 6 to 12 months advised

## 2023-05-08 NOTE — OCCUPATIONAL THERAPY NOTE
Occupational Therapy Progress Note     Patient Name: Lady Bassam JUDDI Date: 5/8/2023  Problem List  Principal Problem:    Stroke [facility])  Active Problems:    Primary hypertension    Nicotine dependence    Bilateral carotid artery stenosis    Urinary retention    Adjustment reaction with anxiety    Type 2 diabetes mellitus (HCC)    Chronic ischemic right MCA stroke    Chronic low back pain    Hyponatremia    Prediabetes    Tachycardia            05/08/23 0857   OT Last Visit   OT Visit Date 05/08/23   Note Type   Note Type Treatment   Pain Assessment   Pain Assessment Tool 0-10   Pain Score No Pain   Hospital Pain Intervention(s) Repositioned; Ambulation/increased activity; Emotional support   Restrictions/Precautions   Weight Bearing Precautions Per Order No   Other Precautions Cognitive; Chair Alarm;Multiple lines; Fall Risk   Lifestyle   Autonomy I with ADL's/shares homemaking with spouse, no AD with functional mobilty, +drivew   Reciprocal Relationships spouse, son   Service to Others retired   Intrinsic Gratification riding motorcyles   ADL   Where Assessed Chair   Eating Assistance 5  Supervision/Setup   Eating Deficit Setup   Grooming Assistance 4  Minimal Assistance   Grooming Deficit Setup;Steadying; Impulsive;Verbal cueing;Supervision/safety; Increased time to complete;Standing with assistive device; Wash/dry hands; Wash/dry face; Teeth care   UB Bathing Assistance 4  Minimal Assistance   UB Bathing Deficit Setup;Steadying; Impulsive;Verbal cueing;Supervision/safety; Increased time to complete; Chest;Right arm;Left arm; Abdomen   LB Bathing Assistance 3  Moderate Assistance   LB Bathing Deficit Setup;Steadying; Impulsive;Verbal cueing;Supervision/safety; Increased time to complete;Right upper leg;Left upper leg;Right lower leg including foot; Left lower leg including foot   UB Dressing Assistance 4  Minimal Assistance   UB Dressing Deficit Setup;Steadying; Impulsive;Verbal cueing;Supervision/safety; Increased time to complete; Thread RUE; Thread LUE;Pull over head;Pull around back;Pull down in back   LB Dressing Assistance 3  Moderate Assistance   LB Dressing Deficit Setup;Steadying; Requires assistive device for steadying; Impulsive;Verbal cueing;Supervision/safety; Increased time to complete; Thread RLE into pants; Thread LLE into pants;Pull up over hips; Fasteners   Toileting Assistance  5  Supervision/Setup   Toileting Deficit Setup;Use of bedpan/urinal setup   Functional Standing Tolerance   Time Approx 2 minutes   Activity Standing @ tabletop to perform grooming tasks   Comments Pt requiring varying Min A in standing with RW for safety and support, demonstrating poor+ dynamic balance during grooming tasks; pt requiring max verbal cues for proper sequencing when applying toothpaste onto toothbrush and pt observed putting toothpaste on back of toothbrush; pt with difficulty following simple 1 step commands as pt was impulsive @ times, reaching hand into water and spilling water during grooming tasks; pt attempting to use RW, pushing tray table out of way, requiring verbal cues for pacing, safety, and proper technique   Bed Mobility   Supine to Sit Unable to assess   Sit to Supine Unable to assess   Additional Comments Upon arrival, pt found sitting upright in recliner; @ end of session, pt left sitting upright in recliner with all functional needs in reach with chair alarm activated   Transfers   Sit to Stand 4  Minimal assistance   Additional items Assist x 1; Impulsive;Verbal cues; Increased time required;Armrests   Stand to Sit 4  Minimal assistance   Additional items Assist x 1; Impulsive;Verbal cues;Armrests; Increased time required   Additional Comments w/ RW for safety and support; in standing, pt requiring varying Min A, demonstrating impulsive behaviors @ times, requiring verbal cues for safety   Functional Mobility   Functional Mobility 3  Moderate assistance   Additional Comments Pt completed few small steps with Mod A x1 w/ "RW for safety and support however pt demonstrating impulsive behaviors, taking RW and pushing it into table top in which pt instructed to return to seated position 2* safety and decreased command following   Additional items Rolling walker   Subjective   Subjective \"Yeah yeah I am doing fine  \"   Cognition   Overall Cognitive Status (S)  Impaired   Arousal/Participation Responsive;Arousable; Cooperative   Attention Difficulty attending to directions   Orientation Level Oriented to person;Oriented to situation;Oriented to place   Memory Decreased recall of precautions;Decreased recall of recent events   Following Commands Follows one step commands inconsistently   Comments Pt pleasant and cooperative however demonstrated decreased safety awareness/insight into functional deficits; pt requiring max verbal cues for proper sequencing throughout ADLs as pt demonstrating behaviors consistent with apraxia @ times as observed pt attempting to wash face with shampoo cap; pt presenting with difficulty following simple 1 step commands, requiring continous max verbal cues for safety and sequencing   Activity Tolerance   Activity Tolerance Patient limited by fatigue   Medical Staff Made Aware RN cleared   Assessment   Assessment Pt is a 59 yo male who actively participated in skilled OT session on 5/8/2023  Treatment focused to improve functional transfers with fall prevention strategies, static/dynamic balance, postural/trunk control, proper body mechanics, functional use of b/l UE's, higher level cognitive functions, safety awareness, and overall increased activity tolerance in ADL/IADL/leisure tasks  Upon arrival, pt found sitting upright in recliner and was agreeable to OT session  Pt performed seated UB dressing/bathing tasks with Min A for proper technique/sequencing and LB dressing/bathing tasks with Mod A 2* decreased functional reach   Pt requiring verbal cues to attend to R side @ times as observed pt perservating on " washing LE UE  Pt performed STS with Min A x1 w/ RW for safety and support and stood for approx 2 minutes to complete standing grooming tasks, demonstrating poor+ dynamic balance during grooming tasks  Pt completed few short steps with Mod A x1 w/ RW for safety and support however observed pt pushing RW into tray table, demonstrating decreased safety awareness and command following  At the end of the session, pt was left sitting upright in recliner with all functional needs in reach with chair alarm activated  Pt demonstrates gradual functional improvements towards OT goals however continues to be functioning below occupational baseline and is still limited by the following limitations/impairments which were addressed through skilled instruction: generalized weakness, balance, functional ROM, cognition, endurance/activity tolerance, postural/trunk control, strength, pain, and safety awareness  At this time, recommend discharge to post-acute rehab when medically stable  The patient's raw score on the -PAC Daily Activity Inpatient Short Form is 14  A raw score of less than 19 suggests the patient may benefit from discharge to post-acute rehabilitation services  Please refer to the recommendation of the Occupational Therapist for safe discharge planning  Established OT goals will be continued 3-5x/wk to address immediate acute care needs and underlying performance skills to maximize occupational performance and safety to return to [facility]  Plan   Treatment Interventions ADL retraining;Functional transfer training;UE strengthening/ROM; Endurance training;Cognitive reorientation;Patient/family training;Equipment evaluation/education; Compensatory technique education; Neuromuscular reeducation; Fine motor coordination activities;Continued evaluation; Energy conservation; Activityengagement   Goal Expiration Date 05/11/23   OT Treatment Day 2   OT Frequency 3-5x/wk   Recommendation   OT Discharge Recommendation Post acute rehabilitation services   AM-PAC Daily Activity Inpatient   Lower Body Dressing 2   Bathing 2   Toileting 2   Upper Body Dressing 2   Grooming 3   Eating 3   Daily Activity Raw Score 14   Daily Activity Standardized Score (Calc for Raw Score >=11) 33 39   AM-PAC Applied Cognition Inpatient   Following a Speech/Presentation 2   Understanding Ordinary Conversation 3   Taking Medications 2   Remembering Where Things Are Placed or Put Away 2   Remembering List of 4-5 Errands 2   Taking Care of Complicated Tasks 2   Applied Cognition Raw Score 13   Applied Cognition Standardized Score 30 46   End of Consult   Education Provided Yes   Patient Position at End of Consult Bedside chair;Bed/Chair alarm activated; All needs within reach   Nurse Communication Nurse aware of consult     Jennifer Sy MS, OTR/L

## 2023-05-08 NOTE — PLAN OF CARE
Problem: PAIN - ADULT  Goal: Verbalizes/displays adequate comfort level or baseline comfort level  Description: Interventions:  - Encourage patient to monitor pain and request assistance  - Assess pain using appropriate pain scale  - Administer analgesics based on type and severity of pain and evaluate response  - Implement non-pharmacological measures as appropriate and evaluate response  - Consider cultural and social influences on pain and pain management  - Notify physician/advanced practitioner if interventions unsuccessful or patient reports new pain  Outcome: Progressing     Problem: NEUROSENSORY - ADULT  Goal: Achieves stable or improved neurological status  Description: INTERVENTIONS  - Monitor and report changes in neurological status  - Monitor vital signs such as temperature, blood pressure, glucose, and any other labs ordered   - Initiate measures to prevent increased intracranial pressure  - Monitor for seizure activity and implement precautions if appropriate      Outcome: Progressing  Goal: Achieves maximal functionality and self care  Description: INTERVENTIONS  - Monitor swallowing and airway patency with patient fatigue and changes in neurological status  - Encourage and assist patient to increase activity and self care     - Encourage visually impaired, hearing impaired and aphasic patients to use assistive/communication devices  Outcome: Progressing

## 2023-05-08 NOTE — RESTORATIVE TECHNICIAN NOTE
Restorative Technician Note      Patient Name: Neeta Arreola     Note Type: Mobility  Patient Position Upon Consult: Bedside chair  Activity Performed: Ambulated; Dangled; Stood  Assistive Device: Roller walker; Other (Comment) (Assist x1 with chair follow )  Education Provided: Yes  Patient Position at End of Consult: Bedside chair;  All needs within reach; Bed/Chair alarm activated    Cristofer REYNA, Restorative Technician, [employer]

## 2023-05-08 NOTE — ASSESSMENT & PLAN NOTE
Reports chronic lower back pain for 40 years with acute flare x 4 weeks which has not improved despite multiple trails of steroids, nsaids and muscle relaxants from the PCP  · XR with degenerative changes in L spine  · Spine MRI advanced multilevel spondylosis, slightly progressed on the right at L2-3 compared to the prior study but otherwise stable     · Patient reports that his back pain is better  · Schedule tylenol, lidocaine patch, robaxin, prn oxy   · PMR evaluating for ARC placement  · Continue supportive care

## 2023-05-09 RX ADMIN — HEPARIN SODIUM 5000 UNITS: 5000 INJECTION INTRAVENOUS; SUBCUTANEOUS at 21:51

## 2023-05-09 RX ADMIN — AMLODIPINE BESYLATE 10 MG: 10 TABLET ORAL at 09:00

## 2023-05-09 RX ADMIN — ASPIRIN 81 MG: 81 TABLET, COATED ORAL at 09:00

## 2023-05-09 RX ADMIN — SENNOSIDES AND DOCUSATE SODIUM 2 TABLET: 8.6; 5 TABLET ORAL at 09:00

## 2023-05-09 RX ADMIN — HEPARIN SODIUM 5000 UNITS: 5000 INJECTION INTRAVENOUS; SUBCUTANEOUS at 16:43

## 2023-05-09 RX ADMIN — LOSARTAN POTASSIUM 50 MG: 50 TABLET, FILM COATED ORAL at 09:00

## 2023-05-09 RX ADMIN — TICAGRELOR 90 MG: 90 TABLET ORAL at 09:00

## 2023-05-09 RX ADMIN — LIDOCAINE 5% 1 PATCH: 700 PATCH TOPICAL at 08:59

## 2023-05-09 RX ADMIN — HYDROMORPHONE HYDROCHLORIDE 0.5 MG: 1 INJECTION, SOLUTION INTRAMUSCULAR; INTRAVENOUS; SUBCUTANEOUS at 08:30

## 2023-05-09 RX ADMIN — PANTOPRAZOLE SODIUM 20 MG: 20 TABLET, DELAYED RELEASE ORAL at 05:51

## 2023-05-09 RX ADMIN — ATORVASTATIN CALCIUM 40 MG: 40 TABLET, FILM COATED ORAL at 09:00

## 2023-05-09 RX ADMIN — DONEPEZIL HYDROCHLORIDE 5 MG: 5 TABLET ORAL at 17:01

## 2023-05-09 RX ADMIN — HYDRALAZINE HYDROCHLORIDE 25 MG: 25 TABLET, FILM COATED ORAL at 16:43

## 2023-05-09 RX ADMIN — HYDROCODONE BITARTRATE AND ACETAMINOPHEN 1 TABLET: 5; 325 TABLET ORAL at 17:46

## 2023-05-09 RX ADMIN — HYDRALAZINE HYDROCHLORIDE 25 MG: 25 TABLET, FILM COATED ORAL at 09:00

## 2023-05-09 RX ADMIN — ACETAMINOPHEN 975 MG: 325 TABLET ORAL at 21:45

## 2023-05-09 RX ADMIN — HYDROCHLOROTHIAZIDE 25 MG: 25 TABLET ORAL at 08:58

## 2023-05-09 RX ADMIN — TICAGRELOR 90 MG: 90 TABLET ORAL at 21:46

## 2023-05-09 RX ADMIN — HYDROCODONE BITARTRATE AND ACETAMINOPHEN 1 TABLET: 5; 325 TABLET ORAL at 09:34

## 2023-05-09 RX ADMIN — CITALOPRAM HYDROBROMIDE 20 MG: 20 TABLET ORAL at 09:00

## 2023-05-09 RX ADMIN — HEPARIN SODIUM 5000 UNITS: 5000 INJECTION INTRAVENOUS; SUBCUTANEOUS at 05:52

## 2023-05-09 RX ADMIN — TAMSULOSIN HYDROCHLORIDE 0.4 MG: 0.4 CAPSULE ORAL at 16:43

## 2023-05-09 RX ADMIN — OXYCODONE HYDROCHLORIDE 10 MG: 10 TABLET ORAL at 16:27

## 2023-05-09 RX ADMIN — OXYCODONE HYDROCHLORIDE 10 MG: 10 TABLET ORAL at 07:25

## 2023-05-09 RX ADMIN — HYDRALAZINE HYDROCHLORIDE 25 MG: 25 TABLET, FILM COATED ORAL at 21:45

## 2023-05-09 RX ADMIN — OXYCODONE HYDROCHLORIDE 10 MG: 10 TABLET ORAL at 11:54

## 2023-05-09 RX ADMIN — METOPROLOL SUCCINATE 100 MG: 100 TABLET, EXTENDED RELEASE ORAL at 09:00

## 2023-05-09 RX ADMIN — DONEPEZIL HYDROCHLORIDE 5 MG: 5 TABLET ORAL at 09:00

## 2023-05-09 RX ADMIN — POLYETHYLENE GLYCOL 3350 17 G: 17 POWDER, FOR SOLUTION ORAL at 09:00

## 2023-05-09 RX ADMIN — ACETAMINOPHEN 975 MG: 325 TABLET ORAL at 05:51

## 2023-05-09 NOTE — ASSESSMENT & PLAN NOTE
· Continue with metoprolol 100mg daily, losartan 50mg daily, hydralazine 25mg TID and amlodipine 10mg daily, HCTZ 25mg   · Monitor blood pressures  · Avoid hypotension

## 2023-05-09 NOTE — PHYSICAL THERAPY NOTE
"                                                                                  PHYSICAL THERAPY CANCEL NOTE          Patient Name: Olaf PITT Date: 5/9/2023 05/09/23 5763   PT Last Visit   PT Visit Date 05/09/23   Note Type   Note Type Cancelled Session   Cancel Reasons Medical status     Attempted to see pt this AM  After 1 STS and step forward pt reporting dizziness  /56 and nystagmus at rest  Pt reporting \"something isnt right\" today  RN aware  Will hold and follow up PM as able and appropriate  Thank you      María Hendrix, PT, DPT    "

## 2023-05-09 NOTE — PLAN OF CARE
Problem: PAIN - ADULT  Goal: Verbalizes/displays adequate comfort level or baseline comfort level  Description: Interventions:  - Encourage patient to monitor pain and request assistance  - Assess pain using appropriate pain scale  - Administer analgesics based on type and severity of pain and evaluate response  - Implement non-pharmacological measures as appropriate and evaluate response  - Consider cultural and social influences on pain and pain management  - Notify physician/advanced practitioner if interventions unsuccessful or patient reports new pain  Outcome: Progressing     Problem: INFECTION - ADULT  Goal: Absence or prevention of progression during hospitalization  Description: INTERVENTIONS:  - Assess and monitor for signs and symptoms of infection  - Monitor lab/diagnostic results  - Monitor all insertion sites, i e  indwelling lines, tubes, and drains  - Monitor endotracheal if appropriate and nasal secretions for changes in amount and color  - [place] appropriate cooling/warming therapies per order  - Administer medications as ordered  - Instruct and encourage patient and family to use good hand hygiene technique  - Identify and instruct in appropriate isolation precautions for identified infection/condition  Outcome: Progressing

## 2023-05-09 NOTE — ASSESSMENT & PLAN NOTE
· Post left ICA stent on 5/ 4  · Continue aspirin, statin and Brilinta  · Patient vascular surgery follow-up

## 2023-05-09 NOTE — ASSESSMENT & PLAN NOTE
Abdominal imaging revealed - finding of 3 4 cm fusiform infrarenal abdominal aortic aneurysm redemonstrated  Surveillance abdominal ultrasound in 6 to 12 months advised    Smoking cessation strongly encouraged

## 2023-05-09 NOTE — PLAN OF CARE
Problem: PAIN - ADULT  Goal: Verbalizes/displays adequate comfort level or baseline comfort level  Description: Interventions:  - Encourage patient to monitor pain and request assistance  - Assess pain using appropriate pain scale  - Administer analgesics based on type and severity of pain and evaluate response  - Implement non-pharmacological measures as appropriate and evaluate response  - Consider cultural and social influences on pain and pain management  - Notify physician/advanced practitioner if interventions unsuccessful or patient reports new pain  Outcome: Progressing     Problem: SAFETY ADULT  Goal: Patient will remain free of falls  Description: INTERVENTIONS:  - Educate patient/family on patient safety including physical limitations  - Instruct patient to call for assistance with activity   - Consult OT/PT to assist with strengthening/mobility   - Keep Call bell within reach  - Keep bed low and locked with side rails adjusted as appropriate  - Keep care items and personal belongings within reach  - Initiate and maintain comfort rounds  - Make Fall Risk Sign visible to staff  - Offer Toileting every  2 Hours, in advance of need  - Initiate/Maintain bed alarm  - Obtain necessary fall risk management equipment: non skid socks  - Apply yellow socks and bracelet for high fall risk patients  - Consider moving patient to room near nurses station  Outcome: Progressing

## 2023-05-09 NOTE — OCCUPATIONAL THERAPY NOTE
"          Occupational Therapy Cancel Note        Patient Name: Darrion Blunt  Today's Date: 5/9/2023 05/09/23 9626   OT Last Visit   OT Visit Date 05/09/23   Note Type   Note Type Cancelled Session   Cancel Reasons Medical status       OT/PT attempted to see pt for co-treatment however upon arrival, pt's BP was 105/56 in which noted pt w/ nystagmus at rest  Pt reporting \"Something is just off  \" Pt left sitting upright in recliner with all functional needs in reach with chair alarm activated and RN was made aware  Will continue to follow on caseload and see when able      Felipe Palacios MS, OTR/L                          "

## 2023-05-09 NOTE — ASSESSMENT & PLAN NOTE
Presented to the ER as a stroke alert with history of known severe R M1 stenosis, prior tobacco use, recent L MCA stroke (residual aphasia) 04/16/2023 & came in as a Stroke alert on 4/26/2023  NIHSS of 2  Initial presenting deficits were L facial droop, R sided weakness, and AMS  As an inpatient patient developed even more neuro sxs including   R homonymous hemianopsia, Rightward leaning with ambulation    · IVY on 5/2 showed EF 60% with no PFO  · MRI on 5/2 showed left parietal stroke that appeared larger  · He underwent angiogram on 5/4 to assess for vasculitis, status post left ICA stent without signs of vasculitis  · Started on Brilinta 4/27/2023  · Continue statin and aspirin   · Neurology inputs noted  · Neurology is following and recommending for replacement of loop recorder  · ARC for rehab

## 2023-05-09 NOTE — CASE MANAGEMENT
Case Management Discharge Planning Note    Patient name Sandhya Bazan  Location [address] [place] [address] 733/[facility] 515-03 MRN [id number]  : 1960 Date 2023       Current Admission Date: 2023  Current Admission Diagnosis:Stroke [facility])   Patient Active Problem List    Diagnosis Date Noted   • Abdominal aortic aneurysm (AAA) ([facility] ) 2023   • Tachycardia 2023   • Prediabetes 2023   • SIRS (systemic inflammatory response syndrome) ([facility] ) 2023   • Chronic anticoagulation 2023   • Stroke ([place] [address] ) 2023   • Hyponatremia 2023   • Middle cerebral artery stenosis, right 2023   • Left posterior MCA stroke - etiology unclear at this time 2023   • Chronic low back pain 2023   • Hypertensive encephalopathy, transient 2023   • Snoring 08/10/2020   • Memory difficulties 08/10/2020   • Chronic ischemic right MCA stroke 2019   • Status post placement of implantable loop recorder 2019   • Type 2 diabetes mellitus ([facility] ) 2019   • Adjustment reaction with anxiety 2019   • Insomnia 2019   • Fall 2019   • 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 ([facility] ) 2019   • Urinary retention 2019   • Hypertriglyceridemia 2019   • Nicotine dependence 2019   • Bilateral carotid artery stenosis 2019   • Presbyopia 2019   • History of stroke 2019   • Headache 2019   • Primary hypertension 2019   • GERD (gastroesophageal reflux disease) 2019      LOS (days): 13  Geometric Mean LOS (GMLOS) (days): 2 00  Days to GMLOS:-11     OBJECTIVE:  Risk of Unplanned Readmission Score: 19 56         Current admission status: Inpatient   Preferred Pharmacy:   [facility] [place] - [address]  [address] [place] [address]  Phone: [phone] Fax: [address] [address]  Phone: [phone] Fax: [phone]    Primary Care Provider: ERIC Lowery    Primary Insurance: [facility]  Secondary Insurance: BLUE CROSS    DISCHARGE DETAILS:          Additional Comments: CM spoke with pt bedside   Advsied insurance [address] is still pending, once received pt will transfer to [facility]

## 2023-05-09 NOTE — PROGRESS NOTES
[facility]  Progress Note  Name: Alyssia Khalil  MRN: [id number]  Unit/Bed#: [facility] 733-01 I Date of Admission: 4/26/2023   Date of Service: 5/9/2023 I Hospital Day: 13    Assessment/Plan   * Stroke [facility])  Assessment & Plan  Presented to the ER as a stroke alert with history of known severe R M1 stenosis, prior tobacco use, recent L MCA stroke (residual aphasia) 04/16/2023 & came in as a Stroke alert on 4/26/2023  NIHSS of 2  Initial presenting deficits were L facial droop, R sided weakness, and AMS  As an inpatient patient developed even more neuro sxs including   R homonymous hemianopsia, Rightward leaning with ambulation  · IVY on 5/2 showed EF 60% with no PFO  · MRI on 5/2 showed left parietal stroke that appeared larger  · He underwent angiogram on 5/4 to assess for vasculitis, status post left ICA stent without signs of vasculitis  · Started on Brilinta 4/27/2023  · Continue statin and aspirin   · Neurology inputs noted  · Neurology is following and recommending for replacement of loop recorder  · ARC for rehab    Abdominal aortic aneurysm (AAA) ([facility] )  Assessment & Plan  Abdominal imaging revealed - finding of 3 4 cm fusiform infrarenal abdominal aortic aneurysm redemonstrated  Surveillance abdominal ultrasound in 6 to 12 months advised  Smoking cessation strongly encouraged    Prediabetes  Assessment & Plan  · A1c 5 8  · Currently holding metformin while hospitalized, resume on discharge   · Carb controlled diet     Chronic low back pain  Assessment & Plan  Reports chronic lower back pain for 40 years with acute flare x 4 weeks which has not improved despite multiple trails of steroids, nsaids and muscle relaxants from the PCP  · XR with degenerative changes in L spine  · Spine MRI advanced multilevel spondylosis, slightly progressed on the right at L2-3 compared to the prior study but otherwise stable     · Patient reports that his back pain is better  · Schedule tylenol, lidocaine patch, robaxin, prn oxy   · PMR evaluating for ARC placement  · Continue supportive care    Chronic ischemic right MCA stroke  Assessment & Plan  · Continue aspirin and statin    Adjustment reaction with anxiety  Assessment & Plan  · Continue celexa   · Supportive cares    Urinary retention  Assessment & Plan  · Lumbar spine MRI with Advanced multilevel spondylosis, slightly progressed on the right at L2-3 compared to the prior study but otherwise stable  · Difficulty placing Cortez catheter per nursing  · Now with overflow incontinence  · Continue with urinary retention protocol   · C/w flomax    Bilateral carotid artery stenosis  Assessment & Plan  · Post left ICA stent on 5/ 4  · Continue aspirin, statin and Brilinta  · Patient vascular surgery follow-up    Nicotine dependence  Assessment & Plan  · Smoking cessation encouraged    Primary hypertension  Assessment & Plan  · Continue with metoprolol 100mg daily, losartan 50mg daily, hydralazine 25mg TID and amlodipine 10mg daily, HCTZ 25mg   · Monitor blood pressures  · Avoid hypotension                 VTE Pharmacologic Prophylaxis: VTE Score: 3 Moderate Risk (Score 3-4) - Pharmacological DVT Prophylaxis Ordered: heparin  Patient Centered Rounds: I performed bedside rounds with nursing staff today  Discussions with Specialists or Other Care Team Provider:     Education and Discussions with Family / Patient: Discussed with the patient, updated spouse Maulik Macjerome in detail questions answered  Total Time Spent on Date of Encounter in care of patient: 35 minutes This time was spent on one or more of the following: performing physical exam; counseling and coordination of care; obtaining or reviewing history; documenting in the medical record; reviewing/ordering tests, medications or procedures; communicating with other healthcare professionals and discussing with patient's family/caregivers      Current Length of Stay: 13 day(s)  Current Patient Status: Inpatient   Certification Statement: The patient will continue to require additional inpatient hospital stay due to As outlined  Discharge Plan: Pending placement Case management following    Code Status: Level 1 - Full Code    Subjective:     Comfortably sitting up in chair  Reports feeling okay  History chart labs medications reviewed    Objective:     Vitals:   Temp (24hrs), Av 4 °F (36 9 °C), Min:98 1 °F (36 7 °C), Max:98 7 °F (37 1 °C)    Temp:  [98 1 °F (36 7 °C)-98 7 °F (37 1 °C)] 98 7 °F (37 1 °C)  HR:  [65-89] 68  Resp:  [18] 18  BP: ()/() 115/52  SpO2:  [95 %-97 %] 96 %  Body mass index is 34 52 kg/m²  Input and Output Summary (last 24 hours):      Intake/Output Summary (Last 24 hours) at 2023 1608  Last data filed at 2023 0731  Gross per 24 hour   Intake 220 ml   Output 725 ml   Net -505 ml       Physical Exam:   Physical Exam       Comfortably in chair  Obese  Short thick neck  Lungs diminished breath sounds bilaterally  Heart sounds S1 and S2 noted next abdomen soft nontender  Awake obey simple commands  Aphasia noted  No edema  No rash    Additional Data:     Labs:  Results from last 7 days   Lab Units 23  0652   WBC Thousand/uL 13 88*   HEMOGLOBIN g/dL 12 1   HEMATOCRIT % 35 7*   PLATELETS Thousands/uL 257   NEUTROS PCT % 58   LYMPHS PCT % 21   MONOS PCT % 12   EOS PCT % 7*     Results from last 7 days   Lab Units 23  0652   SODIUM mmol/L 136   POTASSIUM mmol/L 3 7   CHLORIDE mmol/L 108   CO2 mmol/L 24   BUN mg/dL 25   CREATININE mg/dL 0 77   ANION GAP mmol/L 4   CALCIUM mg/dL 9 0   GLUCOSE RANDOM mg/dL 97     Results from last 7 days   Lab Units 23  0540   INR  1 03     Results from last 7 days   Lab Units 23  1642 23  1521 23  1053 23  0614 23  2049 23  1612 23  1056 23  0629 23  2117 23  1634 23  1129 23  0738   POC GLUCOSE mg/dl 112 198* 142* 113 105 104 105 90 77 149* 110 94               Lines/Drains:  Invasive Devices     Peripheral Intravenous Line  Duration           Peripheral IV 05/05/23 Proximal;Right;Ventral (anterior) Forearm 3 days          Drain  Duration           Urethral Catheter Latex 16 Fr  <1 day              Urinary Catheter:  Goal for removal: Voiding trial when ambulation improves               Imaging: Reviewed radiology reports from this admission including: chest CT scan, abdominal/pelvic CT, MRI brain, MRI spine and procedure reports    Recent Cultures (last 7 days):         Last 24 Hours Medication List:   Current Facility-Administered Medications   Medication Dose Route Frequency Provider Last Rate   • acetaminophen  975 mg Oral Q8H [facility] Lily Delcid, DO     • amLODIPine  10 mg Oral Daily Lily Delcid, DO     • aspirin  81 mg Oral Daily Lily Delcid, DO     • atorvastatin  40 mg Oral Daily Lily Delcid, DO     • bisacodyl  10 mg Rectal Daily PRN Tasneem Carver, DO     • calcium carbonate  1,000 mg Oral Daily PRN Lily Delcid, DO     • citalopram  20 mg Oral Daily Lily Delcid, DO     • donepezil  5 mg Oral BID Lily Delcid, DO     • heparin (porcine)  5,000 Units Subcutaneous Q8H [facility] Lily Delcid, DO     • hydrALAZINE  25 mg Oral TID Lily Delcid, DO     • hydrochlorothiazide  25 mg Oral Daily Lily Delcid, DO     • HYDROcodone-acetaminophen  1 tablet Oral Q6H PRN Lily Delcid, DO     • HYDROmorphone  0 5 mg Intravenous Q4H PRN Lily Delcid, DO     • lidocaine  1 patch Topical Daily Lily Delcid, DO     • LORazepam  1 mg Intravenous Q6H PRN Lily Delcid, DO     • losartan  50 mg Oral Daily Lily Delcid, DO     • methocarbamol  500 mg Oral Q6H PRN Tasneem Carver, DO     • metoprolol succinate  100 mg Oral Daily Lily Delcid, DO     • ondansetron  4 mg Intravenous Q6H PRN Lily Delcid, DO     • oxyCODONE  10 mg Oral Q4H PRN Lily Delcid, DO     • oxyCODONE  5 mg Oral Q4H PRN Lily Delcid, DO     • pantoprazole  20 mg Oral Early Morning Lily Delcid, DO     • phenylephine   mcg/min Intravenous Continuous PRN Lily Delcid, DO     • polyethylene glycol  17 g Oral QAM Lily Delcid, DO     • senna  2 tablet Oral Daily PRN Lily Delcid, DO     • senna-docusate sodium  2 tablet Oral BID Sloane Alberto, DO     • tamsulosin  0 4 mg Oral Daily With [employer], DO     • ticagrelor  90 mg Oral Q12H [facility] Lily Delcid DO          Today, Patient Was Seen By: Aram Julian MD    **Please Note: This note may have been constructed using a voice recognition system  **

## 2023-05-10 RX ORDER — AMLODIPINE BESYLATE 5 MG/1
5 TABLET ORAL DAILY
Status: DISCONTINUED | OUTPATIENT
Start: 2023-05-11 | End: 2023-05-11

## 2023-05-10 RX ORDER — OXYCODONE HYDROCHLORIDE 10 MG/1
10 TABLET ORAL EVERY 6 HOURS PRN
Status: DISCONTINUED | OUTPATIENT
Start: 2023-05-10 | End: 2023-05-12 | Stop reason: HOSPADM

## 2023-05-10 RX ORDER — OXYCODONE HYDROCHLORIDE 5 MG/1
5 TABLET ORAL EVERY 6 HOURS PRN
Status: DISCONTINUED | OUTPATIENT
Start: 2023-05-10 | End: 2023-05-12 | Stop reason: HOSPADM

## 2023-05-10 RX ORDER — HYDRALAZINE HYDROCHLORIDE 10 MG/1
10 TABLET, FILM COATED ORAL 3 TIMES DAILY
Status: DISCONTINUED | OUTPATIENT
Start: 2023-05-10 | End: 2023-05-10

## 2023-05-10 RX ADMIN — DONEPEZIL HYDROCHLORIDE 5 MG: 5 TABLET ORAL at 09:52

## 2023-05-10 RX ADMIN — SENNOSIDES AND DOCUSATE SODIUM 2 TABLET: 8.6; 5 TABLET ORAL at 16:58

## 2023-05-10 RX ADMIN — PANTOPRAZOLE SODIUM 20 MG: 20 TABLET, DELAYED RELEASE ORAL at 05:28

## 2023-05-10 RX ADMIN — CITALOPRAM HYDROBROMIDE 20 MG: 20 TABLET ORAL at 09:50

## 2023-05-10 RX ADMIN — TAMSULOSIN HYDROCHLORIDE 0.4 MG: 0.4 CAPSULE ORAL at 16:57

## 2023-05-10 RX ADMIN — POLYETHYLENE GLYCOL 3350 17 G: 17 POWDER, FOR SOLUTION ORAL at 09:49

## 2023-05-10 RX ADMIN — TICAGRELOR 90 MG: 90 TABLET ORAL at 09:52

## 2023-05-10 RX ADMIN — HEPARIN SODIUM 5000 UNITS: 5000 INJECTION INTRAVENOUS; SUBCUTANEOUS at 21:04

## 2023-05-10 RX ADMIN — LOSARTAN POTASSIUM 50 MG: 50 TABLET, FILM COATED ORAL at 09:52

## 2023-05-10 RX ADMIN — SENNOSIDES AND DOCUSATE SODIUM 2 TABLET: 8.6; 5 TABLET ORAL at 09:50

## 2023-05-10 RX ADMIN — ASPIRIN 81 MG: 81 TABLET, COATED ORAL at 09:52

## 2023-05-10 RX ADMIN — ACETAMINOPHEN 975 MG: 325 TABLET ORAL at 05:28

## 2023-05-10 RX ADMIN — TICAGRELOR 90 MG: 90 TABLET ORAL at 21:05

## 2023-05-10 RX ADMIN — HYDROCHLOROTHIAZIDE 25 MG: 25 TABLET ORAL at 09:50

## 2023-05-10 RX ADMIN — ATORVASTATIN CALCIUM 40 MG: 40 TABLET, FILM COATED ORAL at 09:50

## 2023-05-10 RX ADMIN — METOPROLOL SUCCINATE 100 MG: 100 TABLET, EXTENDED RELEASE ORAL at 09:52

## 2023-05-10 RX ADMIN — HEPARIN SODIUM 5000 UNITS: 5000 INJECTION INTRAVENOUS; SUBCUTANEOUS at 05:28

## 2023-05-10 RX ADMIN — ACETAMINOPHEN 975 MG: 325 TABLET ORAL at 21:04

## 2023-05-10 RX ADMIN — LIDOCAINE 5% 1 PATCH: 700 PATCH TOPICAL at 09:48

## 2023-05-10 RX ADMIN — ACETAMINOPHEN 975 MG: 325 TABLET ORAL at 13:33

## 2023-05-10 RX ADMIN — HEPARIN SODIUM 5000 UNITS: 5000 INJECTION INTRAVENOUS; SUBCUTANEOUS at 13:33

## 2023-05-10 RX ADMIN — DONEPEZIL HYDROCHLORIDE 5 MG: 5 TABLET ORAL at 16:58

## 2023-05-10 RX ADMIN — AMLODIPINE BESYLATE 10 MG: 10 TABLET ORAL at 09:51

## 2023-05-10 RX ADMIN — HYDRALAZINE HYDROCHLORIDE 25 MG: 25 TABLET, FILM COATED ORAL at 09:52

## 2023-05-10 NOTE — ASSESSMENT & PLAN NOTE
Reports chronic lower back pain for 40 years with acute flare x 4 weeks which has not improved despite multiple trails of steroids, nsaids and muscle relaxants from the PCP  · XR with degenerative changes in L spine  · Spine MRI advanced multilevel spondylosis, slightly progressed on the right at L2-3 compared to the prior study but otherwise stable     · Patient reports that his back pain is better  · Continue Tylenol 975 g every 8 hours, lidocaine patch  · Robaxin as needed  · Oxycodone as needed and wean as tolerated  · Supportive cares

## 2023-05-10 NOTE — ASSESSMENT & PLAN NOTE
Abdominal imaging reveals 3 4 cm fusiform infrarenal abdominal arctic aneurysm  Continue atorvastatin  Outpatient follow-up with serial ultrasounds  Smoking cessation strongly encouraged

## 2023-05-10 NOTE — OCCUPATIONAL THERAPY NOTE
"  Occupational Therapy Treatment Note     05/10/23 1302   OT Last Visit   OT Visit Date 05/10/23   Note Type   Note Type Treatment for insurance authorization   Pain Assessment   Pain Assessment Tool 0-10   Pain Score No Pain   Restrictions/Precautions   Weight Bearing Precautions Per Order No   Braces or Orthoses   (none)   Other Precautions Cognitive; Chair Alarm; Fall Risk   Lifestyle   Autonomy I with ADL's/shares homemaking with spouse, no AD with functional mobilty, +drivew   Reciprocal Relationships spouse, son   Service to Others retired   Intrinsic Gratification riding motorcyles   ADL   Where Assessed Other (Comment)  (raised toilet/commode set up over standard toilet)   Grooming Assistance 3  Moderate Assistance   Grooming Deficit Brushing hair   UB Bathing Assistance 4  Minimal Assistance   LB Bathing Assistance 3  Moderate Assistance   UB Dressing Assistance 3  Moderate Assistance   LB Dressing Assistance 3  Moderate Assistance   Toileting Assistance  2  Maximal Assistance   Toileting Deficit Clothing management up;Clothing management down;Perineal hygiene   Transfers   Sit to Stand 3  Moderate assistance   Additional items Assist x 1   Stand to Sit 3  Moderate assistance   Additional items Assist x 2   Stand pivot 3  Moderate assistance   Additional items Assist x 2   Functional Mobility   Functional Mobility 3  Moderate assistance   Additional Comments assist x 2   Additional items Rolling walker   Subjective   Subjective pt stated \"I just don;t feel right\"   Cognition   Overall Cognitive Status Impaired   Arousal/Participation Responsive   Attention Difficulty attending to directions   Orientation Level Oriented to person;Oriented to place;Oriented to situation   Memory Unable to assess   Following Commands Follows one step commands inconsistently   Activity Tolerance   Activity Tolerance Patient tolerated treatment well   Assessment   Assessment Pt seen for participation in Occupational Therapy " "session with focus on activity tolerance, functional transfers/mob, toilet transfers/toileting   Pt cleared by Nelida Marie for pt participated in OT session  Pt presented sitting out of bed to bedside chair and agreeable to participate in therapy following pt identifiers confirmed  Pt was unable to report his therapy goal 2* pt cognitive impairments  Pt however reported \"feeling uncomfortable\" and was noted to have a difficult time describing his discomfort  Pt agreeable walk to toilet  Pt  Required assist x 2 for functional transfers/mob with RW to/from pt bathroom  Pt will require post acute rehab service to continue to address these above noted pt deficit which currently impair pt ADL and functional mob  Pt return to bedside chair post session, chair alarm activated and all needs within reach     Plan   Treatment Interventions ADL retraining   Goal Expiration Date 06/11/23   OT Treatment Day 3   OT Frequency 3-5x/wk   Recommendation   OT Discharge Recommendation Post acute rehabilitation services   AM-PAC Daily Activity Inpatient   Lower Body Dressing 2   Bathing 2   Toileting 2   Upper Body Dressing 2   Grooming 3   Eating 3   Daily Activity Raw Score 14   Daily Activity Standardized Score (Calc for Raw Score >=11) 33 39   AM-PAC Applied Cognition Inpatient   Following a Speech/Presentation 2   Understanding Ordinary Conversation 3   Taking Medications 2   Remembering Where Things Are Placed or Put Away 2   Remembering List of 4-5 Errands 2   Taking Care of Complicated Tasks 2   Applied Cognition Raw Score 13   Applied Cognition Standardized Score 30 46   Barthel Index   Feeding 5   Bathing 0   Grooming Score 0   Dressing Score 5   Bladder Score 10   Bowels Score 5   Toilet Use Score 5   Transfers (Bed/Chair) Score 5   Mobility (Level Surface) Score 0   Stairs Score 0   Barthel Index Score 35       [place] Chip  AGUILAR/L    "

## 2023-05-10 NOTE — ASSESSMENT & PLAN NOTE
Presented to the ER as a stroke alert with history of known severe R M1 stenosis, prior tobacco use, recent L MCA stroke (residual aphasia) 04/16/2023 & came in as a Stroke alert on 4/26/2023  NIHSS of 2  Initial presenting deficits were L facial droop, R sided weakness, and AMS  As an inpatient patient developed even more neuro sxs including   R homonymous hemianopsia, Rightward leaning with ambulation    · IVY on 5/2 showed EF 60% with no PFO  · MRI on 5/2 showed left parietal stroke that appeared larger  · He underwent angiogram on 5/4 to assess for vasculitis, status post left ICA stent without signs of vasculitis  · Started on Brilinta 4/27/2023  · Continue statin and aspirin   · Neurology inputs noted  · ARC for rehab

## 2023-05-10 NOTE — CASE MANAGEMENT
Nancy Medina 49 received request for authorization from Care Manager    Authorization request for: [address] Name:St  [address] [place] [address] [address]   MDM:[id number]  Facility MD:Dr Trista Suero    NPI: [id number]  Authorization initiated via [employer]  Pending Reference #:[id number]  Clinicals submitted via: [employer]

## 2023-05-10 NOTE — PLAN OF CARE
"  Problem: OCCUPATIONAL THERAPY ADULT  Goal: Performs self-care activities at highest level of function for planned discharge setting  See evaluation for individualized goals  Description: Treatment Interventions: ADL retraining          See flowsheet documentation for full assessment, interventions and recommendations  Outcome: Progressing  Note: Limitation: Decreased ADL status, Decreased cognition, Decreased endurance, Decreased Safe judgement during ADL, Decreased fine motor control, Decreased self-care trans, Decreased high-level ADLs, Decreased sensation  Prognosis: Fair  Assessment: Pt seen for participation in Occupational Therapy session with focus on activity tolerance, functional transfers/mob, toilet transfers/toileting   Pt cleared by Vanessa Lee for pt participated in OT session  Pt presented sitting out of bed to bedside chair and agreeable to participate in therapy following pt identifiers confirmed  Pt was unable to report his therapy goal 2* pt cognitive impairments  Pt however reported \"feeling uncomfortable\" and was noted to have a difficult time describing his discomfort  Pt agreeable walk to toilet  Pt  Required assist x 2 for functional transfers/mob with RW to/from pt bathroom  Pt will require post acute rehab service to continue to address these above noted pt deficit which currently impair pt ADL and functional mob  Pt return to bedside chair post session, chair alarm activated and all needs within reach    Recommendation: Speech consult (language/ cog)  OT Discharge Recommendation: Post acute rehabilitation services          "

## 2023-05-10 NOTE — PHYSICAL THERAPY NOTE
Physical Therapy Treatment Note       05/10/23 0920   PT Last Visit   PT Visit Date 05/10/23   Note Type   Note Type Treatment   Pain Assessment   Pain Assessment Tool 0-10   Pain Score No Pain   Restrictions/Precautions   Weight Bearing Precautions Per Order No   Other Precautions Cognitive; Chair Alarm; Bed Alarm; Impulsive;Multiple lines;Telemetry; Fall Risk;Pain  (expressive issues, as well as R sided visual impairments  difficulty following commands)   General   Chart Reviewed Yes   Family/Caregiver Present No   Cognition   Overall Cognitive Status Impaired   Arousal/Participation Responsive   Attention Difficulty attending to directions   Orientation Level Oriented to person;Oriented to place;Oriented to situation   Memory Unable to assess   Following Commands Follows one step commands inconsistently   Subjective   Subjective states he feels less dizzy today  cooperative w/ session  Bed Mobility   Supine to Sit 3  Moderate assistance   Additional items Assist x 1; Increased time required;LE management   Additional Comments sat EOB x multiple minutes prior to transfers, gait   Transfers   Sit to Stand 3  Moderate assistance   Additional items Assist x 1; Impulsive; Increased time required;Verbal cues   Stand to Sit 4  Minimal assistance   Additional items Assist x 1; Increased time required;Verbal cues   Ambulation/Elevation   Gait pattern   (slow, ataxia, short step length R > L, R foot drag, decreased R sided awareness and obstacle avoidance )   Gait Assistance 3  Moderate assist   Additional items Assist x 1  (for 1st 2 trials, then min A of 2 for 3rd trial)   Assistive Device Rolling walker  (RW for 1st 2 trials, HHA of 2 for 3rd trial)   Distance 20'x1, 30'x2 w/ 5 min seated rests between each trial   Balance   Static Sitting Fair   Dynamic Sitting Poor +   Static Standing Poor   Dynamic Standing Poor   Ambulatory Poor   Endurance Deficit   Endurance Deficit Yes   Endurance Deficit Description fatigue, weakness, vision, pain,   Activity Tolerance   Activity Tolerance Patient limited by fatigue;Patient limited by pain;Treatment limited secondary to medical complications (Comment)   Nurse Made Aware yes   Assessment   Prognosis Good   Problem List Decreased strength;Decreased endurance;Decreased mobility; Impaired balance;Decreased coordination;Decreased cognition; Impaired judgement;Decreased safety awareness; Impaired vision; Impaired sensation; Impaired tone;Pain   Assessment Pt seen for session for  setup, bed mob, time spent EOB, transfers, gait w/ rest time, repositioning  Pt cooperative , states he feels less dizzy  Needs cues for R sided awareness, step length, proper RW use   frequent cues to stand closer to RW   continue to anticipate the need for rehab at d/c   Goals   Patient Goals to walk more   STG Expiration Date 05/24/23   Short Term Goal #1 1-2 wks: bed mob and transfers w/ S, standing balance to fair w/ device, ambulate  ft w/ RW and min A of 1, increase B LE strength by 1/2-1 grade   PT Treatment Day 5   Plan   Treatment/Interventions Functional transfer training;LE strengthening/ROM; Therapeutic exercise; Endurance training;Patient/family training;Equipment eval/education; Bed mobility;Gait training   Progress Progressing toward goals   PT Frequency 3-5x/wk   Recommendation   PT Discharge Recommendation Post acute rehabilitation services   AM-PAC Basic Mobility Inpatient   Turning in Flat Bed Without Bedrails 3   Lying on Back to Sitting on Edge of Flat Bed Without Bedrails 2   Moving Bed to Chair 2   Standing Up From Chair Using Arms 2   Walk in Room 2   Climb 3-5 Stairs With Railing 1   Basic Mobility Inpatient Raw Score 12   Basic Mobility Standardized Score 32 23   Highest Level Of Mobility   JH-HLM Goal 4: Move to chair/commode   JH-HLM Achieved 7: Walk 25 feet or more     Victor Hugo Balls PT, DPT CSRS

## 2023-05-10 NOTE — ASSESSMENT & PLAN NOTE
Urinary retention Cortez catheter in place  Continue Flomax  Attempt voiding trial at rehabilitation as ambulation improves

## 2023-05-10 NOTE — ASSESSMENT & PLAN NOTE
S/p left ICA angioplasty with stent placement with neurovascular surgery  Continue Brilinta  Aspirin and statin  Outpatient follow-up

## 2023-05-10 NOTE — ARC ADMISSION
ARC continues to follow patient for medical stability and increased participation and tolerance for therapy at this time before insurance authorization can be submitted  CM has been updated

## 2023-05-10 NOTE — RESTORATIVE TECHNICIAN NOTE
Restorative Technician Note      Patient Name: Eusebia Wagner     Note Type: Mobility  Patient Position Upon Consult: Supine  Activity Performed: Ambulated; [id number]; Stood  Assistive Device: Roller walker; Other (Comment) (Assist x2 with chair follow  Assisted PT Lyudmila Ortega )  Education Provided: Yes  Patient Position at End of Consult: Bedside chair;  All needs within reach; Bed/Chair alarm activated    Valentina REYNA, Restorative Technician, [employer]

## 2023-05-10 NOTE — RESTORATIVE TECHNICIAN NOTE
Restorative Technician Note      Patient Name: Laney Mayen     Note Type: Mobility  Patient Position Upon Consult: Bedside chair  Activity Performed: Ambulated; Dangled; Stood  Assistive Device: Other (Comment) (A x2)  Education Provided: Yes  Patient Position at End of Consult: Bedside chair;  All needs within reach; Bed/Chair alarm activated      Krystal REYNA, Restorative Technician, [employer]

## 2023-05-10 NOTE — PLAN OF CARE
Problem: PHYSICAL THERAPY ADULT  Goal: Performs mobility at highest level of function for planned discharge setting  See evaluation for individualized goals  Description:    Equipment Recommended:  (TBD)       See flowsheet documentation for full assessment, interventions and recommendations  Outcome: Progressing  Note: Prognosis: Good  Problem List: Decreased strength, Decreased endurance, Decreased mobility, Impaired balance, Decreased coordination, Decreased cognition, Impaired judgement, Decreased safety awareness, Impaired vision, Impaired sensation, Impaired tone, Pain  Assessment: Pt seen for session for  setup, bed mob, time spent EOB, transfers, gait w/ rest time, repositioning  Pt cooperative , states he feels less dizzy  Needs cues for R sided awareness, step length, proper RW use   frequent cues to stand closer to RW   continue to anticipate the need for rehab at d/c  Barriers to Discharge: Inaccessible home environment     PT Discharge Recommendation: Post acute rehabilitation services    See flowsheet documentation for full assessment

## 2023-05-10 NOTE — PROGRESS NOTES
[address] [facility]  Progress Note  Name: Nancy Zee  MRN: [id number]  Unit/Bed#: [facility] 733-01 I Date of Admission: 4/26/2023   Date of Service: 5/10/2023 I Hospital Day: 14    Assessment/Plan   * Stroke [facility])  Assessment & Plan  Presented to the ER as a stroke alert with history of known severe R M1 stenosis, prior tobacco use, recent L MCA stroke (residual aphasia) 04/16/2023 & came in as a Stroke alert on 4/26/2023  NIHSS of 2  Initial presenting deficits were L facial droop, R sided weakness, and AMS  As an inpatient patient developed even more neuro sxs including   R homonymous hemianopsia, Rightward leaning with ambulation  · IVY on 5/2 showed EF 60% with no PFO  · MRI on 5/2 showed left parietal stroke that appeared larger  · He underwent angiogram on 5/4 to assess for vasculitis, status post left ICA stent without signs of vasculitis  · Started on Brilinta 4/27/2023  · Continue statin and aspirin   · Neurology inputs noted  · ARC for rehab    Abdominal aortic aneurysm (AAA) ([facility] [address] )  Assessment & Plan  Abdominal imaging reveals 3 4 cm fusiform infrarenal abdominal arctic aneurysm  Continue atorvastatin  Outpatient follow-up with serial ultrasounds  Smoking cessation strongly encouraged    Prediabetes  Assessment & Plan  · A1c 5 8  · Metformin on hold while inpatient  · Carb stable diet    Chronic low back pain  Assessment & Plan  Reports chronic lower back pain for 40 years with acute flare x 4 weeks which has not improved despite multiple trails of steroids, nsaids and muscle relaxants from the PCP  · XR with degenerative changes in L spine  · Spine MRI advanced multilevel spondylosis, slightly progressed on the right at L2-3 compared to the prior study but otherwise stable     · Patient reports that his back pain is better  · Continue Tylenol 975 g every 8 hours, lidocaine patch  · Robaxin as needed  · Oxycodone as needed and wean as tolerated  · Supportive cares    Chronic ischemic right MCA stroke  Assessment & Plan  · History of stroke noted  · Aspirin and atorvastatin    Adjustment reaction with anxiety  Assessment & Plan  · Continue Celexa    Urinary retention  Assessment & Plan  Urinary retention Cortez catheter in place  Continue Flomax  Attempt voiding trial at rehabilitation as ambulation improves        Bilateral carotid artery stenosis  Assessment & Plan  S/p left ICA angioplasty with stent placement with neurovascular surgery  Continue Brilinta  Aspirin and statin  Outpatient follow-up    Nicotine dependence  Assessment & Plan  · Smoking cessation strongly encouraged    Primary hypertension  Assessment & Plan  Blood pressures stabilizing  Decrease amlodipine to 5 mg daily, hold hydralazine presently  Continue hydrochlorothiazide 25 mg p o  daily  Continue losartan 50 mg daily, metoprolol  mg daily  Monitor blood pressures closely, avoid hypotension               VTE Pharmacologic Prophylaxis: VTE Score: 3 Moderate Risk (Score 3-4) - Pharmacological DVT Prophylaxis Ordered: heparin  Patient Centered Rounds: I performed bedside rounds with nursing staff today  Discussions with Specialists or Other Care Team Provider:     Education and Discussions with Family / Patient: patient, updated spouse Klever Nguyen - questions answered  Total Time Spent on Date of Encounter in care of patient: 35 minutes This time was spent on one or more of the following: performing physical exam; counseling and coordination of care; obtaining or reviewing history; documenting in the medical record; reviewing/ordering tests, medications or procedures; communicating with other healthcare professionals and discussing with patient's family/caregivers      Current Length of Stay: 14 day(s)  Current Patient Status: Inpatient   Certification Statement: The patient will continue to require additional inpatient hospital stay due to as outlined  Discharge Plan: pending placement - case management following     Code Status: Level 1 - Full Code    Subjective:     Comfortably sitting up in chair   Reports feeling okay   Encouraged incentive spirometry     Objective:     Vitals:   Temp (24hrs), Av 1 °F (36 7 °C), Min:98 1 °F (36 7 °C), Max:98 1 °F (36 7 °C)    Temp:  [98 1 °F (36 7 °C)] 98 1 °F (36 7 °C)  HR:  [68-92] 92  Resp:  [18-19] 19  BP: (110-134)/(53-63) 134/63  SpO2:  [95 %-97 %] 97 %  Body mass index is 34 52 kg/m²  Input and Output Summary (last 24 hours):      Intake/Output Summary (Last 24 hours) at 5/10/2023 1527  Last data filed at 5/10/2023 0900  Gross per 24 hour   Intake --   Output 1050 ml   Net -1050 ml       Physical Exam:   Physical Exam       Comfortably sitting up in chair  Obese  Short thick neck  Lungs diminished breath sounds bilaterally  Heart sounds S1-S2 noted  Heart sounds are distant  Abdomen soft nontender  Abdominal obesity noted  Awake obey simple commands  Dysarthria noted  No pedal edema  No rash    Additional Data:     Labs:  Results from last 7 days   Lab Units 23  0652   WBC Thousand/uL 13 88*   HEMOGLOBIN g/dL 12 1   HEMATOCRIT % 35 7*   PLATELETS Thousands/uL 257   NEUTROS PCT % 58   LYMPHS PCT % 21   MONOS PCT % 12   EOS PCT % 7*     Results from last 7 days   Lab Units 23  0652   SODIUM mmol/L 136   POTASSIUM mmol/L 3 7   CHLORIDE mmol/L 108   CO2 mmol/L 24   BUN mg/dL 25   CREATININE mg/dL 0 77   ANION GAP mmol/L 4   CALCIUM mg/dL 9 0   GLUCOSE RANDOM mg/dL 97     Results from last 7 days   Lab Units 23  0540   INR  1 03     Results from last 7 days   Lab Units 23  1642 23  1521 23  1053 23  0614 23  2049 23  1612 23  1056 23  0629 23  2117 23  1634 23  1129 23  0738   POC GLUCOSE mg/dl 112 198* 142* 113 105 104 105 90 77 149* 110 94               Lines/Drains:  Invasive Devices     Peripheral Intravenous Line  Duration           Peripheral IV 05/10/23 Dorsal (posterior); Left Hand <1 day          Drain  Duration           Urethral Catheter Latex 16 Fr  1 day              Urinary Catheter:  Goal for removal: Voiding trial when ambulation improves               Imaging: Reviewed radiology reports from this admission including: MRI brain    Recent Cultures (last 7 days):         Last 24 Hours Medication List:   Current Facility-Administered Medications   Medication Dose Route Frequency Provider Last Rate   • acetaminophen  975 mg Oral Q8H [address] Lliy Delcid, DO     • [START ON 5/11/2023] amLODIPine  5 mg Oral Daily Fredy Irby MD     • aspirin  81 mg Oral Daily Lily Delcid, DO     • atorvastatin  40 mg Oral Daily Lily Delcid, DO     • bisacodyl  10 mg Rectal Daily PRN Darion Almanza, DO     • calcium carbonate  1,000 mg Oral Daily PRN Lily Delcid, DO     • citalopram  20 mg Oral Daily Lily Delcid, DO     • donepezil  5 mg Oral BID Lily Delcid, DO     • heparin (porcine)  5,000 Units Subcutaneous Q8H [address] Lily Delcid, DO     • hydrochlorothiazide  25 mg Oral Daily Lily Delcid, DO     • HYDROcodone-acetaminophen  1 tablet Oral Q6H PRN Lily Delcid, DO     • lidocaine  1 patch Topical Daily Lily Delcid, DO     • losartan  50 mg Oral Daily Lily Delcid, DO     • methocarbamol  500 mg Oral Q6H PRN Darion Almanza, DO     • metoprolol succinate  100 mg Oral Daily Lily Delcid, DO     • ondansetron  4 mg Intravenous Q6H PRN Lily Delcid, DO     • oxyCODONE  10 mg Oral Q6H PRN Fredy Irby MD     • oxyCODONE  5 mg Oral Q6H PRN Fredy Irby MD     • pantoprazole  20 mg Oral Early Morning Lily Delcid, DO     • polyethylene glycol  17 g Oral QAM Lily Delcid, DO     • senna  2 tablet Oral Daily PRN Lily Delcid, DO     • senna-docusate sodium  2 tablet Oral BID Darion Almanza, DO     • tamsulosin  0 4 mg Oral Daily With [employer], DO     • ticagrelor  90 mg Oral Q12H [address] Lily Delcid DO          Today, Patient Was Seen By: Jesus Shay MD    **Please Note: This note may have been constructed using a voice recognition system  **

## 2023-05-10 NOTE — ASSESSMENT & PLAN NOTE
Blood pressures stabilizing  Decrease amlodipine to 5 mg daily, hold hydralazine presently  Continue hydrochlorothiazide 25 mg p o  daily  Continue losartan 50 mg daily, metoprolol  mg daily  Monitor blood pressures closely, avoid hypotension

## 2023-05-11 LAB
FLUAV RNA RESP QL NAA+PROBE: NEGATIVE
FLUBV RNA RESP QL NAA+PROBE: NEGATIVE
RSV RNA RESP QL NAA+PROBE: NEGATIVE
SARS-COV-2 RNA RESP QL NAA+PROBE: NEGATIVE

## 2023-05-11 RX ORDER — HYDROCHLOROTHIAZIDE 12.5 MG/1
12.5 TABLET ORAL DAILY
Status: DISCONTINUED | OUTPATIENT
Start: 2023-05-12 | End: 2023-05-12 | Stop reason: HOSPADM

## 2023-05-11 RX ADMIN — SENNOSIDES AND DOCUSATE SODIUM 2 TABLET: 8.6; 5 TABLET ORAL at 17:34

## 2023-05-11 RX ADMIN — METHOCARBAMOL 500 MG: 500 TABLET ORAL at 17:34

## 2023-05-11 RX ADMIN — METOPROLOL SUCCINATE 100 MG: 100 TABLET, EXTENDED RELEASE ORAL at 08:38

## 2023-05-11 RX ADMIN — OXYCODONE HYDROCHLORIDE 10 MG: 10 TABLET ORAL at 07:31

## 2023-05-11 RX ADMIN — ASPIRIN 81 MG: 81 TABLET, COATED ORAL at 08:38

## 2023-05-11 RX ADMIN — TICAGRELOR 90 MG: 90 TABLET ORAL at 09:45

## 2023-05-11 RX ADMIN — HYDROCHLOROTHIAZIDE 25 MG: 25 TABLET ORAL at 08:38

## 2023-05-11 RX ADMIN — DONEPEZIL HYDROCHLORIDE 5 MG: 5 TABLET ORAL at 08:38

## 2023-05-11 RX ADMIN — ACETAMINOPHEN 975 MG: 325 TABLET ORAL at 05:10

## 2023-05-11 RX ADMIN — HEPARIN SODIUM 5000 UNITS: 5000 INJECTION INTRAVENOUS; SUBCUTANEOUS at 05:11

## 2023-05-11 RX ADMIN — AMLODIPINE BESYLATE 5 MG: 5 TABLET ORAL at 08:38

## 2023-05-11 RX ADMIN — HYDROCODONE BITARTRATE AND ACETAMINOPHEN 1 TABLET: 5; 325 TABLET ORAL at 14:57

## 2023-05-11 RX ADMIN — OXYCODONE HYDROCHLORIDE 10 MG: 10 TABLET ORAL at 14:03

## 2023-05-11 RX ADMIN — ACETAMINOPHEN 975 MG: 325 TABLET ORAL at 21:26

## 2023-05-11 RX ADMIN — ATORVASTATIN CALCIUM 40 MG: 40 TABLET, FILM COATED ORAL at 08:38

## 2023-05-11 RX ADMIN — HYDROCODONE BITARTRATE AND ACETAMINOPHEN 1 TABLET: 5; 325 TABLET ORAL at 08:38

## 2023-05-11 RX ADMIN — CITALOPRAM HYDROBROMIDE 20 MG: 20 TABLET ORAL at 08:38

## 2023-05-11 RX ADMIN — HEPARIN SODIUM 5000 UNITS: 5000 INJECTION INTRAVENOUS; SUBCUTANEOUS at 21:26

## 2023-05-11 RX ADMIN — TICAGRELOR 90 MG: 90 TABLET ORAL at 21:26

## 2023-05-11 RX ADMIN — PANTOPRAZOLE SODIUM 20 MG: 20 TABLET, DELAYED RELEASE ORAL at 05:10

## 2023-05-11 RX ADMIN — HEPARIN SODIUM 5000 UNITS: 5000 INJECTION INTRAVENOUS; SUBCUTANEOUS at 14:03

## 2023-05-11 RX ADMIN — ACETAMINOPHEN 975 MG: 325 TABLET ORAL at 14:02

## 2023-05-11 RX ADMIN — DONEPEZIL HYDROCHLORIDE 5 MG: 5 TABLET ORAL at 17:34

## 2023-05-11 RX ADMIN — LOSARTAN POTASSIUM 50 MG: 50 TABLET, FILM COATED ORAL at 08:38

## 2023-05-11 RX ADMIN — TAMSULOSIN HYDROCHLORIDE 0.4 MG: 0.4 CAPSULE ORAL at 17:34

## 2023-05-11 NOTE — OCCUPATIONAL THERAPY NOTE
"  Occupational Therapy Treatment Note     05/11/23 0848   OT Last Visit   OT Visit Date 05/11/23   Note Type   Note Type Treatment   Pain Assessment   Pain Assessment Tool 0-10   Restrictions/Precautions   Weight Bearing Precautions Per Order No   Braces or Orthoses   (none)   Other Precautions Cognitive; Chair Alarm; Fall Risk   Lifestyle   Autonomy I with ADL's/shares homemaking with spouse, no AD with functional mobilty, +drivew   Reciprocal Relationships spouse, son   Service to Others retired   Intrinsic Gratification riding motorcyles   ADL   Where Assessed Edge of bed   Grooming Assistance 4  Minimal Assistance   Grooming Deficit Brushing hair   UB Bathing Assistance 4  Minimal Assistance   [address] 3  Moderate Assistance   LB Dressing Assistance 3  Moderate Assistance   Toileting Assistance  2  Maximal Assistance   Bed Mobility   Supine to Sit 3  Moderate assistance   Additional items Assist x 2   Transfers   Sit to Stand 3  Moderate assistance   Additional items Assist x 2   Stand to Sit 3  Moderate assistance   Additional items Assist x 2   Stand pivot 3  Moderate assistance   Additional items Assist x 2   Toilet transfer 3  Moderate assistance   Additional items Assist x 2   Functional Mobility   Functional Mobility 3  Moderate assistance   Additional Comments assist x 2   Additional items Hand hold assistance   Subjective   Subjective pt stated \" no I haven't gone yet\" pt reported that he has not had a BM   Cognition   Overall Cognitive Status Impaired   Arousal/Participation Responsive   Attention Difficulty attending to directions   Orientation Level Oriented to person;Oriented to place;Oriented to situation   Memory Unable to assess   Following Commands Follows one step commands inconsistently   Activity Tolerance   Activity Tolerance Patient tolerated treatment well   Assessment   Assessment Pt seen for participation in Occupational Therapy session with focus on activity tolerance, bed " mob, functional transfers/mobr pt engagement in UB/LB self-care tasks and toilet transfers    Pt cleared by GARRETT/Felix for pt participated in OT session  Pt presented HOB raised pt awake/alert and agreeable to participate in therapy following pt identifiers confirmed  Pt did not reported his therapy goal this session  Pt required assist for x 2 for bed mob, functional transfers and bedside commode transfers 2* decreased coordination and balance deficits  Pt will require post acute rehab service to continue to address these above noted pt deficit which currently impair pt ADL and functional mob  Pt return  bedside chair post session, chair  alarm activated and all needs within reach     Plan   Treatment Interventions ADL retraining   Goal Expiration Date 05/11/23   OT Treatment Day 4   OT Frequency 3-5x/wk   Recommendation   OT Discharge Recommendation Post acute rehabilitation services   AM-PAC Daily Activity Inpatient   Lower Body Dressing 2   Bathing 2   Toileting 2   Upper Body Dressing 2   Grooming 3   Eating 3   Daily Activity Raw Score 14   Daily Activity Standardized Score (Calc for Raw Score >=11) 33 39   AM-PAC Applied Cognition Inpatient   Following a Speech/Presentation 2   Understanding Ordinary Conversation 3   Taking Medications 2   Remembering Where Things Are Placed or Put Away 2   Remembering List of 4-5 Errands 2   Taking Care of Complicated Tasks 2   Applied Cognition Raw Score 13   Applied Cognition Standardized Score 30 46   Barthel Index   Feeding 5   Bathing 0   Grooming Score 0   Dressing Score 5   Bladder Score 0   Bowels Score 5   Toilet Use Score 5   Transfers (Bed/Chair) Score 5   Mobility (Level Surface) Score 0   Stairs Score 0   Barthel Index Score 25       Gwenevere [place]  AGUILAR/L

## 2023-05-11 NOTE — ASSESSMENT & PLAN NOTE
Urinary retention requiring Cortez catheter  Continue Flomax  Voiding trial as ambulation improves at rehab

## 2023-05-11 NOTE — PROGRESS NOTES
[address] [facility]  Progress Note  Name: Mane Leiva  MRN: [id number]  Unit/Bed#: [facility]PHP 733-01 I Date of Admission: 4/26/2023   Date of Service: 5/11/2023 I Hospital Day: 15    Assessment/Plan   * Stroke [facility])  Assessment & Plan  Presented to the ER as a stroke alert with history of known severe R M1 stenosis, prior tobacco use, recent L MCA stroke (residual aphasia) 04/16/2023 & came in as a Stroke alert on 4/26/2023  NIHSS of 2  Initial presenting deficits were L facial droop, R sided weakness, and AMS  As an inpatient patient developed even more neuro sxs including   R homonymous hemianopsia, Rightward leaning with ambulation    · IVY on 5/2 showed EF 60% with no PFO  · MRI on 5/2 showed left parietal stroke that appeared larger  · S/p cerebral angiogram with left ICA angioplasty and stent placement  · He is initiated on Brilinta 4/27/2023  · Continue aspirin, statin  · Neurology inputs noted  · Physical therapy  · Awaiting rehab placement Case management following    Abdominal aortic aneurysm (AAA) ([facility] )  Assessment & Plan  Abdominal imaging reveals 3 4 fusiform infrarenal abdominal aortic aneurysm  Commend outpatient follow-up with serial abdominal ultrasound  Smoking cessation strongly encouraged  Atorvastatin    Prediabetes  Assessment & Plan  · A1c 5 8  · Continue carb consistent diet  · Metformin at discharge    Hyponatremia  Assessment & Plan  · Resolved    Chronic low back pain  Assessment & Plan  · Patient with history of chronic low back pain  · Reports some improvement  · X-ray revealed degenerative changes  · MRI spine revealed multilevel spondylosis  · Continue Tylenol 975 mg every 8 hours  · Lidocaine patch  · Oxycodone as needed and wean as tolerated  · Robaxin as needed  · Supportive cares    Chronic ischemic right MCA stroke  Assessment & Plan  · History of stroke  · Aspirin, atorvastatin    Adjustment reaction with anxiety  Assessment & Plan  · Continue Celexa    Urinary retention  Assessment & Plan  Urinary retention requiring Cortez catheter  Continue Flomax  Voiding trial as ambulation improves at rehab        Bilateral carotid artery stenosis  Assessment & Plan  S/p left ICA angioplasty with stent placement with neurovascular surgery  Continue aspirin and statin  Brilinta  Outpatient follow-up    Nicotine dependence  Assessment & Plan  · Smoking cessation encouraged    Primary hypertension  Assessment & Plan  Blood pressure noted  Will hold amlodipine  Reduce hydrochlorothiazide to 12 5 mg daily  Continue losartan 50 mg daily  Continue metoprolol  mg daily   Avoid hypotension  Monitor blood pressures                 VTE Pharmacologic Prophylaxis: VTE Score: 3 Moderate Risk (Score 3-4) - Pharmacological DVT Prophylaxis Ordered: heparin  Patient Centered Rounds: I performed bedside rounds with nursing staff today  Discussions with Specialists or Other Care Team Provider: Case management    Education and Discussions with Family / Patient: Patient, called and left a message for spouse Clarke Gunn over phone  Total Time Spent on Date of Encounter in care of patient: 35 minutes This time was spent on one or more of the following: performing physical exam; counseling and coordination of care; obtaining or reviewing history; documenting in the medical record; reviewing/ordering tests, medications or procedures; communicating with other healthcare professionals and discussing with patient's family/caregivers      Current Length of Stay: 15 day(s)  Current Patient Status: Inpatient   Certification Statement: The patient will continue to require additional inpatient hospital stay due to as outlined  Discharge Plan: pending placement - case management following     Code Status: Level 1 - Full Code    Subjective:     Comfortably in bed  Reports feeling okay  Encourage incentive spirometry    Objective:     Vitals:   Temp (24hrs), Av 4 °F (36 9 °C), Min:98 °F (36 7 °C), Max:98 9 °F (37 2 °C)    Temp:  [98 °F (36 7 °C)-98 9 °F (37 2 °C)] 98 °F (36 7 °C)  HR:  [60-80] 65  Resp:  [17-18] 18  BP: ()/(48-56) 98/53  SpO2:  [96 %-97 %] 97 %  Body mass index is 34 52 kg/m²  Input and Output Summary (last 24 hours): Intake/Output Summary (Last 24 hours) at 5/11/2023 1518  Last data filed at 5/11/2023 0900  Gross per 24 hour   Intake --   Output 1050 ml   Net -1050 ml       Physical Exam:   Physical Exam       Comfortably in bed  Obese  Short thick neck  Lungs diminished breath sounds bilaterally  Heart sounds S1-S2 noted  Heart sounds are distant  Abdomen soft nontender  Abdominal obesity noted  Awake alert obey simple commands  Dysarthria noted  Left-sided weakness noted  No rash    Additional Data:     Labs:  Results from last 7 days   Lab Units 05/06/23  0652   WBC Thousand/uL 13 88*   HEMOGLOBIN g/dL 12 1   HEMATOCRIT % 35 7*   PLATELETS Thousands/uL 257   NEUTROS PCT % 58   LYMPHS PCT % 21   MONOS PCT % 12   EOS PCT % 7*     Results from last 7 days   Lab Units 05/06/23  0652   SODIUM mmol/L 136   POTASSIUM mmol/L 3 7   CHLORIDE mmol/L 108   CO2 mmol/L 24   BUN mg/dL 25   CREATININE mg/dL 0 77   ANION GAP mmol/L 4   CALCIUM mg/dL 9 0   GLUCOSE RANDOM mg/dL 97     Results from last 7 days   Lab Units 05/05/23  0540   INR  1 03     Results from last 7 days   Lab Units 05/08/23  1642 05/07/23  1521 05/07/23  1053 05/07/23  0614 05/06/23  2049 05/06/23  1612 05/06/23  1056 05/06/23  0629 05/05/23  2117 05/05/23  1634 05/05/23  1129 05/05/23  0738   POC GLUCOSE mg/dl 112 198* 142* 113 105 104 105 90 77 149* 110 94               Lines/Drains:  Invasive Devices     Peripheral Intravenous Line  Duration           Peripheral IV 05/10/23 Dorsal (posterior); Left Hand 1 day          Drain  Duration           Urethral Catheter Latex 16 Fr  2 days              Urinary Catheter:  Goal for removal: Voiding trial when ambulation improves               Imaging: No pertinent imaging reviewed  Recent Cultures (last 7 days):         Last 24 Hours Medication List:   Current Facility-Administered Medications   Medication Dose Route Frequency Provider Last Rate   • acetaminophen  975 mg Oral Q8H [address] Lily Delcid, DO     • aspirin  81 mg Oral Daily Lily Delcid, DO     • atorvastatin  40 mg Oral Daily Lily Delcid, DO     • bisacodyl  10 mg Rectal Daily PRN Gladies Kalie, DO     • calcium carbonate  1,000 mg Oral Daily PRN Lily Delcid, DO     • citalopram  20 mg Oral Daily Lily Delcid, DO     • donepezil  5 mg Oral BID Lily Delcid, DO     • heparin (porcine)  5,000 Units Subcutaneous Q8H [address] Lily Delcid, DO     • [START ON 5/12/2023] hydrochlorothiazide  12 5 mg Oral Daily Gifty Rocha MD     • HYDROcodone-acetaminophen  1 tablet Oral Q6H PRN Lily Delcid, DO     • lidocaine  1 patch Topical Daily Lily Delcid, DO     • losartan  50 mg Oral Daily Lily Delcid, DO     • methocarbamol  500 mg Oral Q6H PRN Gladies Kalie, DO     • metoprolol succinate  100 mg Oral Daily Lily Delcid, DO     • ondansetron  4 mg Intravenous Q6H PRN Lily Delcid, DO     • oxyCODONE  10 mg Oral Q6H PRN Gifty Rocha MD     • oxyCODONE  5 mg Oral Q6H PRN Gifty Rocha MD     • pantoprazole  20 mg Oral Early Morning Lily Delcid, DO     • polyethylene glycol  17 g Oral QAM Lily Delcid, DO     • senna  2 tablet Oral Daily PRN Lily Delcid, DO     • senna-docusate sodium  2 tablet Oral BID Gladies Kalie, DO     • tamsulosin  0 4 mg Oral Daily With [employer], DO     • ticagrelor  90 mg Oral Q12H [address] Lily Delcid, DO          Today, Patient Was Seen By: Gifty Rocha MD    **Please Note: This note may have been constructed using a voice recognition system  **

## 2023-05-11 NOTE — ASSESSMENT & PLAN NOTE
S/p left ICA angioplasty with stent placement with neurovascular surgery  Continue aspirin and statin  Brilinta  Outpatient follow-up

## 2023-05-11 NOTE — PLAN OF CARE
Problem: PAIN - ADULT  Goal: Verbalizes/displays adequate comfort level or baseline comfort level  Description: Interventions:  - Encourage patient to monitor pain and request assistance  - Assess pain using appropriate pain scale  - Administer analgesics based on type and severity of pain and evaluate response  - Implement non-pharmacological measures as appropriate and evaluate response  - Consider cultural and social influences on pain and pain management  - Notify physician/advanced practitioner if interventions unsuccessful or patient reports new pain  Outcome: Progressing     Problem: INFECTION - ADULT  Goal: Absence or prevention of progression during hospitalization  Description: INTERVENTIONS:  - Assess and monitor for signs and symptoms of infection  - Monitor lab/diagnostic results  - Monitor all insertion sites, i e  indwelling lines, tubes, and drains  - Monitor endotracheal if appropriate and nasal secretions for changes in amount and color  - [place] appropriate cooling/warming therapies per order  - Administer medications as ordered  - Instruct and encourage patient and family to use good hand hygiene technique  - Identify and instruct in appropriate isolation precautions for identified infection/condition  Outcome: Progressing     Problem: SAFETY ADULT  Goal: Patient will remain free of falls  Description: INTERVENTIONS:  - Educate patient/family on patient safety including physical limitations  - Instruct patient to call for assistance with activity   - Consult OT/PT to assist with strengthening/mobility   - Keep Call bell within reach  - Keep bed low and locked with side rails adjusted as appropriate  - Keep care items and personal belongings within reach  - Initiate and maintain comfort rounds  - Make Fall Risk Sign visible to staff  - Offer Toileting every 2 Hours, in advance of need  - Initiate/Maintain bed alarm  - Obtain necessary fall risk management equipment: bed alarm  - Apply yellow socks and bracelet for high fall risk patients  - Consider moving patient to room near nurses station  Outcome: Progressing     Problem: DISCHARGE PLANNING  Goal: Discharge to home or other facility with appropriate resources  Description: INTERVENTIONS:  - Identify barriers to discharge w/patient and caregiver  - Arrange for needed discharge resources and transportation as appropriate  - Identify discharge learning needs (meds, wound care, etc )  - Arrange for interpretive services to assist at discharge as needed  - Refer to Case Management Department for coordinating discharge planning if the patient needs post-hospital services based on physician/advanced practitioner order or complex needs related to functional status, cognitive ability, or social support system  Outcome: Progressing     Problem: Knowledge Deficit  Goal: Patient/family/caregiver demonstrates understanding of disease process, treatment plan, medications, and discharge instructions  Description: Complete learning assessment and assess knowledge base  Interventions:  - Provide teaching at level of understanding  - Provide teaching via preferred learning methods  Outcome: Progressing     Problem: NEUROSENSORY - ADULT  Goal: Achieves stable or improved neurological status  Description: INTERVENTIONS  - Monitor and report changes in neurological status  - Monitor vital signs such as temperature, blood pressure, glucose, and any other labs ordered   - Initiate measures to prevent increased intracranial pressure  - Monitor for seizure activity and implement precautions if appropriate      Outcome: Progressing  Goal: Achieves maximal functionality and self care  Description: INTERVENTIONS  - Monitor swallowing and airway patency with patient fatigue and changes in neurological status  - Encourage and assist patient to increase activity and self care     - Encourage visually impaired, hearing impaired and aphasic patients to use assistive/communication devices  Outcome: Progressing     Problem: CARDIOVASCULAR - ADULT  Goal: Maintains optimal cardiac output and hemodynamic stability  Description: INTERVENTIONS:  - Monitor I/O, vital signs and rhythm  - Monitor for S/S and trends of decreased cardiac output  - Administer and titrate ordered vasoactive medications to optimize hemodynamic stability  - Assess quality of pulses, skin color and temperature  - Assess for signs of decreased coronary artery perfusion  - Instruct patient to report change in severity of symptoms  Outcome: Progressing  Goal: Absence of cardiac dysrhythmias or at baseline rhythm  Description: INTERVENTIONS:  - Continuous cardiac monitoring, vital signs, obtain 12 lead EKG if ordered  - Administer antiarrhythmic and heart rate control medications as ordered  - Monitor electrolytes and administer replacement therapy as ordered  Outcome: Progressing     Problem: METABOLIC, FLUID AND ELECTROLYTES - ADULT  Goal: Electrolytes maintained within normal limits  Description: INTERVENTIONS:  - Monitor labs and assess patient for signs and symptoms of electrolyte imbalances  - Administer electrolyte replacement as ordered  - Monitor response to electrolyte replacements, including repeat lab results as appropriate  - Instruct patient on fluid and nutrition as appropriate  Outcome: Progressing  Goal: Glucose maintained within target range  Description: INTERVENTIONS:  - Monitor Blood Glucose as ordered  - Assess for signs and symptoms of hyperglycemia and hypoglycemia  - Administer ordered medications to maintain glucose within target range  - Assess nutritional intake and initiate nutrition service referral as needed  Outcome: Progressing     Problem: MOBILITY - ADULT  Goal: Maintain or return to baseline ADL function  Description: INTERVENTIONS:  -  Assess patient's ability to carry out ADLs; assess patient's baseline for ADL function and identify physical deficits which impact ability to perform ADLs (bathing, care of mouth/teeth, toileting, grooming, dressing, etc )  - Assess/evaluate cause of self-care deficits   - Assess range of motion  - Assess patient's mobility; develop plan if impaired  - Assess patient's need for assistive devices and provide as appropriate  - Encourage maximum independence but intervene and supervise when necessary  - Involve family in performance of ADLs  - Assess for home care needs following discharge   - Consider OT consult to assist with ADL evaluation and planning for discharge  - Provide patient education as appropriate  Outcome: Progressing  Goal: Maintains/Returns to pre admission functional level  Description: INTERVENTIONS:  - Perform BMAT or MOVE assessment daily    - Set and communicate daily mobility goal to care team and patient/family/caregiver  - Collaborate with rehabilitation services on mobility goals if consulted  - Perform Range of Motion 2 times a day  - Reposition patient every 2 hours    - Dangle patient 2 times a day  - Stand patient 2 times a day  - Ambulate patient 2 times a day  - Out of bed to chair 3 times a day   - Out of bed for meals 3 times a day  - Out of bed for toileting  - Record patient progress and toleration of activity level   Outcome: Progressing     Problem: Prexisting or High Potential for Compromised Skin Integrity  Goal: Skin integrity is maintained or improved  Description: INTERVENTIONS:  - Identify patients at risk for skin breakdown  - Assess and monitor skin integrity  - Assess and monitor nutrition and hydration status  - Monitor labs   - Assess for incontinence   - Turn and reposition patient  - Assist with mobility/ambulation  - Relieve pressure over bony prominences  - Avoid friction and shearing  - Provide appropriate hygiene as needed including keeping skin clean and dry  - Evaluate need for skin moisturizer/barrier cream  - Collaborate with interdisciplinary team   - Patient/family teaching  - Consider wound care consult Outcome: Progressing     Problem: Nutrition/Hydration-ADULT  Goal: Nutrient/Hydration intake appropriate for improving, restoring or maintaining nutritional needs  Description: Monitor and assess patient's nutrition/hydration status for malnutrition  Collaborate with interdisciplinary team and initiate plan and interventions as ordered  Monitor patient's weight and dietary intake as ordered or per policy  Utilize nutrition screening tool and intervene as necessary  Determine patient's food preferences and provide high-protein, high-caloric foods as appropriate       INTERVENTIONS:  - Monitor oral intake, urinary output, labs, and treatment plans  - Assess nutrition and hydration status and recommend course of action  - Evaluate amount of meals eaten  - Assist patient with eating if necessary   - Allow adequate time for meals  - Recommend/ encourage appropriate diets, oral nutritional supplements, and vitamin/mineral supplements  - Order, calculate, and assess calorie counts as needed  - Recommend, monitor, and adjust tube feedings and TPN/PPN based on assessed needs  - Assess need for intravenous fluids  - Provide specific nutrition/hydration education as appropriate  - Include patient/family/caregiver in decisions related to nutrition  Outcome: Progressing

## 2023-05-11 NOTE — ASSESSMENT & PLAN NOTE
Presented to the ER as a stroke alert with history of known severe R M1 stenosis, prior tobacco use, recent L MCA stroke (residual aphasia) 04/16/2023 & came in as a Stroke alert on 4/26/2023  NIHSS of 2  Initial presenting deficits were L facial droop, R sided weakness, and AMS  As an inpatient patient developed even more neuro sxs including   R homonymous hemianopsia, Rightward leaning with ambulation    · IVY on 5/2 showed EF 60% with no PFO  · MRI on 5/2 showed left parietal stroke that appeared larger  · S/p cerebral angiogram with left ICA angioplasty and stent placement  · He is initiated on Brilinta 4/27/2023  · Continue aspirin, statin  · Neurology inputs noted  · Physical therapy  · Awaiting rehab placement Case management following

## 2023-05-11 NOTE — PLAN OF CARE
Problem: OCCUPATIONAL THERAPY ADULT  Goal: Performs self-care activities at highest level of function for planned discharge setting  See evaluation for individualized goals  Description: Treatment Interventions: ADL retraining          See flowsheet documentation for full assessment, interventions and recommendations  Outcome: Progressing  Note: Limitation: Decreased ADL status, Decreased cognition, Decreased endurance, Decreased Safe judgement during ADL, Decreased fine motor control, Decreased self-care trans, Decreased high-level ADLs, Decreased sensation  Prognosis: Fair  Assessment: Pt seen for participation in Occupational Therapy session with focus on activity tolerance, bed mob, functional transfers/mobr pt engagement in UB/LB self-care tasks and toilet transfers    Pt cleared by RN/Felix for pt participated in OT session  Pt presented HOB raised pt awake/alert and agreeable to participate in therapy following pt identifiers confirmed  Pt did not reported his therapy goal this session  Pt required assist for x 2 for bed mob, functional transfers and bedside commode transfers 2* decreased coordination and balance deficits  Pt will require post acute rehab service to continue to address these above noted pt deficit which currently impair pt ADL and functional mob  Pt return  bedside chair post session, chair  alarm activated and all needs within reach    Recommendation: Speech consult (language/ cog)  OT Discharge Recommendation: Post acute rehabilitation services

## 2023-05-11 NOTE — ASSESSMENT & PLAN NOTE
Abdominal imaging reveals 3 4 fusiform infrarenal abdominal aortic aneurysm  Commend outpatient follow-up with serial abdominal ultrasound  Smoking cessation strongly encouraged  Atorvastatin

## 2023-05-11 NOTE — CASE MANAGEMENT
Case Management Discharge Planning Note    Patient name Belen Narayanan  [facility] [address] [id number]  : 1960 Date 2023       Current Admission Date: 2023  Current Admission Diagnosis:Stroke [facility])   Patient Active Problem List    Diagnosis Date Noted   • Abdominal aortic aneurysm (AAA) ([facility] ) 2023   • Tachycardia 2023   • Prediabetes 2023   • SIRS (systemic inflammatory response syndrome) ([facility] ) 2023   • Chronic anticoagulation 2023   • Stroke ([facility] ) 2023   • Hyponatremia 2023   • Middle cerebral artery stenosis, right 2023   • Left posterior MCA stroke - etiology unclear at this time 2023   • Chronic low back pain 2023   • Hypertensive encephalopathy, transient 2023   • Snoring 08/10/2020   • Memory difficulties 08/10/2020   • Chronic ischemic right MCA stroke 2019   • Status post placement of implantable loop recorder 2019   • Type 2 diabetes mellitus ([facility] ) 2019   • Adjustment reaction with anxiety 2019   • Insomnia 2019   • Fall 2019   • 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 ([facility] ) 2019   • Urinary retention 2019   • Hypertriglyceridemia 2019   • Nicotine dependence 2019   • Bilateral carotid artery stenosis 2019   • Presbyopia 2019   • History of stroke 2019   • Headache 2019   • Primary hypertension 2019   • GERD (gastroesophageal reflux disease) 2019      LOS (days): 15  Geometric Mean LOS (GMLOS) (days): 2 00  Days to GMLOS:-12 9     OBJECTIVE:  Risk of Unplanned Readmission Score: 19 13         Current admission status: Inpatient   Preferred Pharmacy:   [facility] [place] [place] - [address]  [address] [place] [address]  Phone: [phone] Fax: [address] [address]  Phone: [phone] Fax: [phone]    Primary Care Provider: ERIC Jones    Primary Insurance: [facility]  Secondary Insurance: BLUE [place]OSS    DISCHARGE DETAILS:         Other Referral/Resources/Interventions Provided:  Referral Comments: CM resubmitted authorization on May 11 and requested DC support reaah out and see if it is possible to esscalate  Family notified[de-identified] CM received a phoone call from pt spouse Eren Johnson [phone]  Richard Curtis voiced her concern and frustration with the insurance auth   She did reach out to [facility] and asked if she could escalate the request  Richard Curtis spoke with Miguel Pettit the  who told her to ask thta the [facility] also ecalate the request

## 2023-05-11 NOTE — PLAN OF CARE
Problem: PHYSICAL THERAPY ADULT  Goal: Performs mobility at highest level of function for planned discharge setting  See evaluation for individualized goals  Description:    Equipment Recommended:  (TBD)       See flowsheet documentation for full assessment, interventions and recommendations  Outcome: Progressing  Note: Prognosis: Good  Problem List: Decreased strength, Decreased endurance, Decreased mobility, Impaired balance, Decreased coordination, Decreased cognition, Impaired judgement, Decreased safety awareness, Impaired vision, Impaired sensation, Impaired tone, Pain  Assessment: Pt seen for PT treatment session w/ focus on bed mobility training, t/f training, + gait training  Pt continues to demonstrate ataxic gait w/ short step length, RLE>LLE  Step length + steadiness improved w/ use of railing instead of B HHA  Continue to recommend rehab upon d/c   Barriers to Discharge: Inaccessible home environment     PT Discharge Recommendation: Post acute rehabilitation services    See flowsheet documentation for full assessment

## 2023-05-11 NOTE — ASSESSMENT & PLAN NOTE
Blood pressure noted  Will hold amlodipine  Reduce hydrochlorothiazide to 12 5 mg daily  Continue losartan 50 mg daily  Continue metoprolol  mg daily   Avoid hypotension  Monitor blood pressures

## 2023-05-11 NOTE — PHYSICAL THERAPY NOTE
Physical Therapy Treatment Note    Patient's Name: Helio Villalba  : 23 1149   PT Last Visit   PT Visit Date 23   Note Type   Note Type Treatment   Pain Assessment   Pain Assessment Tool 0-10   Pain Score No Pain   Restrictions/Precautions   Weight Bearing Precautions Per Order No   Other Precautions Cognitive; Chair Alarm; Bed Alarm; Fall Risk   General   Chart Reviewed Yes   Response to Previous Treatment Patient with no complaints from previous session  Family/Caregiver Present No   Subjective   Subjective Pt agreeable to mobilize  Bed Mobility   Supine to Sit 3  Moderate assistance   Additional items Assist x 2; Increased time required;Verbal cues   Sit to Supine Unable to assess   Additional Comments Pt greeted in supine  Transfers   Sit to Stand 3  Moderate assistance   Additional items Assist x 2; Increased time required;Verbal cues   Stand to Sit 3  Moderate assistance   Additional items Assist x 2; Increased time required;Verbal cues   Additional Comments RW; ModAx2 initially, progressed to ModAx1 from chair   Ambulation/Elevation   Gait pattern Excessively slow; Short stride; Ataxia; Decreased foot clearance; Improper Weight shift; Shuffling;R Foot drag   Gait Assistance 3  Moderate assist  (ModAx2 w/ B HHA, MinAx 1 w/ rail)   Additional items Assist x 2;Verbal cues; Tactile cues   Assistive Device   (B HHA and rail in hallway)   Distance 20' + 15' + 15'   Balance   Static Sitting Fair   Dynamic Sitting Poor +   Static Standing Poor   Dynamic Standing Poor   Ambulatory Poor   Endurance Deficit   Endurance Deficit Yes   Endurance Deficit Description TURNER, fatigue, weakness   Activity Tolerance   Activity Tolerance Patient limited by fatigue   Medical Staff Made Aware mia [facility]ap[facility]   Exercises   Neuro re-ed Dynamic sitting balance, performing ADL tasks  Assessment   Prognosis Good   Problem List Decreased strength;Decreased endurance;Decreased mobility; Impaired balance;Decreased coordination;Decreased cognition; Impaired judgement;Decreased safety awareness; Impaired vision; Impaired sensation; Impaired tone;Pain   Assessment Pt seen for PT treatment session w/ focus on bed mobility training, t/f training, + gait training  Pt continues to demonstrate ataxic gait w/ short step length, RLE>LLE  Step length + steadiness improved w/ use of railing instead of B HHA  Continue to recommend rehab upon d/c    Barriers to Discharge Inaccessible home environment   Goals   Patient Goals get better   PT Treatment Day 6   Plan   Treatment/Interventions Functional transfer training;LE strengthening/ROM; Elevations; Therapeutic exercise; Endurance training;Patient/family training;Equipment eval/education; Bed mobility;Gait training; Compensatory technique education;Spoke to nursing;OT  (balance training)   Progress Progressing toward goals   PT Frequency 3-5x/wk   Recommendation   PT Discharge Recommendation Post acute rehabilitation services   AM-PAC Basic Mobility Inpatient   Turning in Flat Bed Without Bedrails 3   Lying on Back to Sitting on Edge of Flat Bed Without Bedrails 2   Moving Bed to Chair 2   Standing Up From Chair Using Arms 2   Walk in Room 2   Climb 3-5 Stairs With Railing 1   Basic Mobility Inpatient Raw Score 12   Basic Mobility Standardized Score 32 23   Highest Level Of Mobility   JH-HLM Goal 4: Move to chair/commode   JH-HLM Achieved 6: Walk 10 steps or more   Education   Education Provided Mobility training   Patient Demonstrates acceptance/verbal understanding;Reinforcement needed   End of Consult   Patient Position at End of Consult Bedside chair;Bed/Chair alarm activated; All needs within reach       Umu Koroma, PT, DPT

## 2023-05-11 NOTE — OCCUPATIONAL THERAPY NOTE
"  Occupational Therapy Treatment Note       05/11/23 11:48   OT Last Visit   OT Visit Date 05/11/23   Note Type   Note Type Treatment   Pain Assessment   Pain Assessment Tool 0-10   Restrictions/Precautions   Weight Bearing Precautions Per Order No   Braces or Orthoses   (none)   Other Precautions Cognitive; Chair Alarm; Fall Risk   Lifestyle   Autonomy I with ADL's/shares homemaking with spouse, no AD with functional mobilty, +drivew   Reciprocal Relationships spouse, son   Service to Others retired   Intrinsic Gratification riding motorcyles   ADL   Where Assessed Edge of bed   Grooming Assistance 4  Minimal Assistance   Grooming Deficit Brushing hair   UB Bathing Assistance 4  Minimal Assistance   [address] 3  Moderate Assistance   LB Dressing Assistance 3  Moderate Assistance   Toileting Assistance  2  Maximal Assistance   Bed Mobility   Supine to Sit 3  Moderate assistance   Additional items Assist x 2   Transfers   Sit to Stand 3  Moderate assistance   Additional items Assist x 2   Stand to Sit 3  Moderate assistance   Additional items Assist x 2   Stand pivot 3  Moderate assistance   Additional items Assist x 2   Toilet transfer 3  Moderate assistance   Additional items Assist x 2   Functional Mobility   Functional Mobility 3  Moderate assistance   Additional Comments assist x 2   Additional items Hand hold assistance   Subjective   Subjective pt stated \" no I haven't gone yet\" pt reported that he has not had a BM   Cognition   Overall Cognitive Status Impaired   Arousal/Participation Responsive   Attention Difficulty attending to directions   Orientation Level Oriented to person;Oriented to place;Oriented to situation   Memory Unable to assess   Following Commands Follows one step commands inconsistently   Activity Tolerance   Activity Tolerance Patient tolerated treatment well   Assessment   Assessment Pt seen for participation in Occupational Therapy session with focus on activity tolerance, bed " mob, functional transfers/mobr pt engagement in UB/LB self-care tasks and toilet transfers    Pt cleared by GARRETT/Felix for pt participated in OT session  Pt presented HOB raised pt awake/alert and agreeable to participate in therapy following pt identifiers confirmed  Pt did not reported his therapy goal this session  Pt required assist for x 2 for bed mob, functional transfers and bedside commode transfers 2* decreased coordination and balance deficits  Pt will require post acute rehab service to continue to address these above noted pt deficit which currently impair pt ADL and functional mob  Pt return  bedside chair post session, chair  alarm activated and all needs within reach     Plan   Treatment Interventions ADL retraining   Goal Expiration Date 05/11/23   OT Treatment Day 4   OT Frequency 3-5x/wk   Recommendation   OT Discharge Recommendation Post acute rehabilitation services   AM-PAC Daily Activity Inpatient   Lower Body Dressing 2   Bathing 2   Toileting 2   Upper Body Dressing 2   Grooming 3   Eating 3   Daily Activity Raw Score 14   Daily Activity Standardized Score (Calc for Raw Score >=11) 33 39   AM-PAC Applied Cognition Inpatient   Following a Speech/Presentation 2   Understanding Ordinary Conversation 3   Taking Medications 2   Remembering Where Things Are Placed or Put Away 2   Remembering List of 4-5 Errands 2   Taking Care of Complicated Tasks 2   Applied Cognition Raw Score 13   Applied Cognition Standardized Score 30 46   Barthel Index   Feeding 5   Bathing 0   Grooming Score 0   Dressing Score 5   Bladder Score 0   Bowels Score 5   Toilet Use Score 5   Transfers (Bed/Chair) Score 5   Mobility (Level Surface) Score 0   Stairs Score 0   Barthel Index Score 25     Sade Deal  AGUILAR/L

## 2023-05-11 NOTE — RESTORATIVE TECHNICIAN NOTE
Restorative Technician Note      Patient Name: Lucas Fabian     Note Type: Mobility  Patient Position Upon Consult: Supine  Activity Performed: Ambulated; Dangled; Stood  Assistive Device: Other (Comment) (HHA x2 with chair follow  Assisted PT Perla )  Education Provided: Yes  Patient Position at End of Consult: Bedside chair;  All needs within reach; Bed/Chair alarm activated  Sharmila REYNA, Restorative Technician, [employer]

## 2023-05-11 NOTE — ASSESSMENT & PLAN NOTE
· Patient with history of chronic low back pain  · Reports some improvement  · X-ray revealed degenerative changes  · MRI spine revealed multilevel spondylosis  · Continue Tylenol 975 mg every 8 hours  · Lidocaine patch  · Oxycodone as needed and wean as tolerated  · Robaxin as needed  · Supportive cares

## 2023-05-12 ENCOUNTER — HOSPITAL ENCOUNTER (INPATIENT)
Facility: HOSPITAL | Age: 63
LOS: 25 days | Discharge: NURSING, REHAB OR LONG-TERM CARE | DRG: 057 | End: 2023-06-06
Attending: STUDENT IN AN ORGANIZED HEALTH CARE EDUCATION/TRAINING PROGRAM | Admitting: STUDENT IN AN ORGANIZED HEALTH CARE EDUCATION/TRAINING PROGRAM
Payer: COMMERCIAL

## 2023-05-12 VITALS
OXYGEN SATURATION: 95 % | TEMPERATURE: 98.4 F | DIASTOLIC BLOOD PRESSURE: 58 MMHG | BODY MASS INDEX: 34.4 KG/M2 | SYSTOLIC BLOOD PRESSURE: 131 MMHG | WEIGHT: 227 LBS | HEIGHT: 68 IN | HEART RATE: 60 BPM | RESPIRATION RATE: 16 BRPM

## 2023-05-12 DIAGNOSIS — G91.2 NPH (NORMAL PRESSURE HYDROCEPHALUS) (HCC): ICD-10-CM

## 2023-05-12 DIAGNOSIS — Z91.89 AT RISK FOR VENOUS THROMBOEMBOLISM (VTE): ICD-10-CM

## 2023-05-12 DIAGNOSIS — G47.00 INSOMNIA: ICD-10-CM

## 2023-05-12 DIAGNOSIS — R33.9 URINARY RETENTION: ICD-10-CM

## 2023-05-12 DIAGNOSIS — K21.9 GASTROESOPHAGEAL REFLUX DISEASE, UNSPECIFIED WHETHER ESOPHAGITIS PRESENT: ICD-10-CM

## 2023-05-12 DIAGNOSIS — I63.9 RECURRENT STROKES (HCC): ICD-10-CM

## 2023-05-12 DIAGNOSIS — F43.22 ADJUSTMENT REACTION WITH ANXIETY: ICD-10-CM

## 2023-05-12 DIAGNOSIS — E87.1 HYPONATREMIA: ICD-10-CM

## 2023-05-12 DIAGNOSIS — E11.9 TYPE 2 DIABETES MELLITUS WITHOUT COMPLICATION, WITHOUT LONG-TERM CURRENT USE OF INSULIN (HCC): Primary | ICD-10-CM

## 2023-05-12 DIAGNOSIS — I65.23 BILATERAL CAROTID ARTERY STENOSIS: ICD-10-CM

## 2023-05-12 DIAGNOSIS — I63.9 STROKE (CEREBRUM) (HCC): ICD-10-CM

## 2023-05-12 DIAGNOSIS — R06.83 SNORING: ICD-10-CM

## 2023-05-12 DIAGNOSIS — R52 PAIN: ICD-10-CM

## 2023-05-12 DIAGNOSIS — R41.3 MEMORY DEFICITS: ICD-10-CM

## 2023-05-12 DIAGNOSIS — M62.838 MUSCLE SPASMS OF BOTH LOWER EXTREMITIES: ICD-10-CM

## 2023-05-12 DIAGNOSIS — I71.43 INFRARENAL ABDOMINAL AORTIC ANEURYSM (AAA) WITHOUT RUPTURE (HCC): ICD-10-CM

## 2023-05-12 DIAGNOSIS — R41.89 COGNITIVE IMPAIRMENT: ICD-10-CM

## 2023-05-12 DIAGNOSIS — E04.2 MULTIPLE THYROID NODULES: ICD-10-CM

## 2023-05-12 PROBLEM — I69.30 CHRONIC ISCHEMIC LEFT MCA STROKE: Status: ACTIVE | Noted: 2023-05-12

## 2023-05-12 PROBLEM — D72.829 LEUKOCYTOSIS: Status: ACTIVE | Noted: 2023-05-12

## 2023-05-12 PROBLEM — Z86.73 CHRONIC ISCHEMIC LEFT MCA STROKE: Status: ACTIVE | Noted: 2023-05-12

## 2023-05-12 PROBLEM — Z87.891 HISTORY OF TOBACCO USE: Status: ACTIVE | Noted: 2023-05-12

## 2023-05-12 PROBLEM — E87.6 HYPOKALEMIA: Status: ACTIVE | Noted: 2023-05-12

## 2023-05-12 PROCEDURE — 99255 IP/OBS CONSLTJ NEW/EST HI 80: CPT | Performed by: NURSE PRACTITIONER

## 2023-05-12 PROCEDURE — 99223 1ST HOSP IP/OBS HIGH 75: CPT | Performed by: STUDENT IN AN ORGANIZED HEALTH CARE EDUCATION/TRAINING PROGRAM

## 2023-05-12 RX ORDER — LIDOCAINE 50 MG/G
1 PATCH TOPICAL DAILY
Status: DISCONTINUED | OUTPATIENT
Start: 2023-05-13 | End: 2023-06-06 | Stop reason: HOSPADM

## 2023-05-12 RX ORDER — ONDANSETRON 4 MG/1
4 TABLET, ORALLY DISINTEGRATING ORAL EVERY 6 HOURS PRN
Status: DISCONTINUED | OUTPATIENT
Start: 2023-05-12 | End: 2023-06-06 | Stop reason: HOSPADM

## 2023-05-12 RX ORDER — HYDROCHLOROTHIAZIDE 12.5 MG/1
12.5 TABLET ORAL DAILY
Refills: 0
Start: 2023-05-13 | End: 2023-06-06

## 2023-05-12 RX ORDER — BISACODYL 10 MG
10 SUPPOSITORY, RECTAL RECTAL DAILY PRN
Status: DISCONTINUED | OUTPATIENT
Start: 2023-05-12 | End: 2023-06-06 | Stop reason: HOSPADM

## 2023-05-12 RX ORDER — ACETAMINOPHEN 325 MG/1
975 TABLET ORAL EVERY 8 HOURS SCHEDULED
Refills: 0
Start: 2023-05-12 | End: 2023-06-06

## 2023-05-12 RX ORDER — LOSARTAN POTASSIUM 50 MG/1
50 TABLET ORAL DAILY
Status: DISCONTINUED | OUTPATIENT
Start: 2023-05-13 | End: 2023-05-15

## 2023-05-12 RX ORDER — HYDROCODONE BITARTRATE AND ACETAMINOPHEN 5; 325 MG/1; MG/1
1 TABLET ORAL EVERY 6 HOURS PRN
Status: DISCONTINUED | OUTPATIENT
Start: 2023-05-12 | End: 2023-05-12

## 2023-05-12 RX ORDER — ACETAMINOPHEN 325 MG/1
975 TABLET ORAL EVERY 8 HOURS SCHEDULED
Status: DISCONTINUED | OUTPATIENT
Start: 2023-05-12 | End: 2023-05-18

## 2023-05-12 RX ORDER — SENNOSIDES 8.6 MG
2 TABLET ORAL DAILY PRN
Status: DISCONTINUED | OUTPATIENT
Start: 2023-05-12 | End: 2023-06-06 | Stop reason: HOSPADM

## 2023-05-12 RX ORDER — CALCIUM CARBONATE 500 MG/1
1000 TABLET, CHEWABLE ORAL DAILY PRN
Status: DISCONTINUED | OUTPATIENT
Start: 2023-05-12 | End: 2023-06-06 | Stop reason: HOSPADM

## 2023-05-12 RX ORDER — HEPARIN SODIUM 5000 [USP'U]/ML
5000 INJECTION, SOLUTION INTRAVENOUS; SUBCUTANEOUS EVERY 8 HOURS SCHEDULED
Status: DISCONTINUED | OUTPATIENT
Start: 2023-05-12 | End: 2023-06-06 | Stop reason: HOSPADM

## 2023-05-12 RX ORDER — METHOCARBAMOL 500 MG/1
500 TABLET, FILM COATED ORAL EVERY 6 HOURS PRN
Status: DISCONTINUED | OUTPATIENT
Start: 2023-05-12 | End: 2023-06-01

## 2023-05-12 RX ORDER — ATORVASTATIN CALCIUM 40 MG/1
40 TABLET, FILM COATED ORAL DAILY
Status: DISCONTINUED | OUTPATIENT
Start: 2023-05-13 | End: 2023-06-06 | Stop reason: HOSPADM

## 2023-05-12 RX ORDER — AMOXICILLIN 250 MG
2 CAPSULE ORAL 2 TIMES DAILY
Status: DISCONTINUED | OUTPATIENT
Start: 2023-05-12 | End: 2023-06-06 | Stop reason: HOSPADM

## 2023-05-12 RX ORDER — TAMSULOSIN HYDROCHLORIDE 0.4 MG/1
0.4 CAPSULE ORAL
Status: DISCONTINUED | OUTPATIENT
Start: 2023-05-12 | End: 2023-05-19

## 2023-05-12 RX ORDER — OXYCODONE HYDROCHLORIDE 10 MG/1
10 TABLET ORAL EVERY 6 HOURS PRN
Status: DISCONTINUED | OUTPATIENT
Start: 2023-05-12 | End: 2023-05-12

## 2023-05-12 RX ORDER — METOPROLOL SUCCINATE 100 MG/1
100 TABLET, EXTENDED RELEASE ORAL DAILY
Status: DISCONTINUED | OUTPATIENT
Start: 2023-05-13 | End: 2023-05-31

## 2023-05-12 RX ORDER — POLYETHYLENE GLYCOL 3350 17 G/17G
17 POWDER, FOR SOLUTION ORAL EVERY MORNING
Refills: 0
Start: 2023-05-13

## 2023-05-12 RX ORDER — OXYCODONE HYDROCHLORIDE 5 MG/1
5 TABLET ORAL EVERY 6 HOURS PRN
Status: DISCONTINUED | OUTPATIENT
Start: 2023-05-12 | End: 2023-06-06 | Stop reason: HOSPADM

## 2023-05-12 RX ORDER — DONEPEZIL HYDROCHLORIDE 5 MG/1
5 TABLET, FILM COATED ORAL 2 TIMES DAILY
Status: DISCONTINUED | OUTPATIENT
Start: 2023-05-12 | End: 2023-06-06 | Stop reason: HOSPADM

## 2023-05-12 RX ORDER — METHOCARBAMOL 500 MG/1
500 TABLET, FILM COATED ORAL EVERY 6 HOURS PRN
Refills: 0
Start: 2023-05-12 | End: 2023-06-06

## 2023-05-12 RX ORDER — OXYCODONE HYDROCHLORIDE 5 MG/1
5 TABLET ORAL EVERY 6 HOURS PRN
Status: DISCONTINUED | OUTPATIENT
Start: 2023-05-12 | End: 2023-05-12

## 2023-05-12 RX ORDER — HYDROCHLOROTHIAZIDE 12.5 MG/1
12.5 TABLET ORAL DAILY
Status: DISCONTINUED | OUTPATIENT
Start: 2023-05-13 | End: 2023-05-15

## 2023-05-12 RX ORDER — AMOXICILLIN 250 MG
2 CAPSULE ORAL 2 TIMES DAILY
Refills: 0
Start: 2023-05-12

## 2023-05-12 RX ORDER — POLYETHYLENE GLYCOL 3350 17 G/17G
17 POWDER, FOR SOLUTION ORAL EVERY MORNING
Status: DISCONTINUED | OUTPATIENT
Start: 2023-05-13 | End: 2023-06-06 | Stop reason: HOSPADM

## 2023-05-12 RX ORDER — PANTOPRAZOLE SODIUM 20 MG/1
20 TABLET, DELAYED RELEASE ORAL
Status: DISCONTINUED | OUTPATIENT
Start: 2023-05-13 | End: 2023-06-06 | Stop reason: HOSPADM

## 2023-05-12 RX ORDER — CITALOPRAM 20 MG/1
20 TABLET ORAL DAILY
Status: DISCONTINUED | OUTPATIENT
Start: 2023-05-13 | End: 2023-06-06 | Stop reason: HOSPADM

## 2023-05-12 RX ADMIN — HEPARIN SODIUM 5000 UNITS: 5000 INJECTION INTRAVENOUS; SUBCUTANEOUS at 21:47

## 2023-05-12 RX ADMIN — DONEPEZIL HYDROCHLORIDE 5 MG: 5 TABLET ORAL at 17:24

## 2023-05-12 RX ADMIN — HYDROCHLOROTHIAZIDE 12.5 MG: 12.5 TABLET ORAL at 09:52

## 2023-05-12 RX ADMIN — DONEPEZIL HYDROCHLORIDE 5 MG: 5 TABLET ORAL at 09:52

## 2023-05-12 RX ADMIN — ACETAMINOPHEN 975 MG: 325 TABLET ORAL at 21:46

## 2023-05-12 RX ADMIN — TICAGRELOR 90 MG: 90 TABLET ORAL at 09:55

## 2023-05-12 RX ADMIN — TAMSULOSIN HYDROCHLORIDE 0.4 MG: 0.4 CAPSULE ORAL at 16:40

## 2023-05-12 RX ADMIN — HEPARIN SODIUM 5000 UNITS: 5000 INJECTION INTRAVENOUS; SUBCUTANEOUS at 16:40

## 2023-05-12 RX ADMIN — CITALOPRAM HYDROBROMIDE 20 MG: 20 TABLET ORAL at 09:53

## 2023-05-12 RX ADMIN — SENNOSIDES, DOCUSATE SODIUM 2 TABLET: 8.6; 5 TABLET ORAL at 17:24

## 2023-05-12 RX ADMIN — TICAGRELOR 90 MG: 90 TABLET ORAL at 21:47

## 2023-05-12 RX ADMIN — SENNOSIDES AND DOCUSATE SODIUM 2 TABLET: 8.6; 5 TABLET ORAL at 09:53

## 2023-05-12 RX ADMIN — POLYETHYLENE GLYCOL 3350 17 G: 17 POWDER, FOR SOLUTION ORAL at 09:51

## 2023-05-12 RX ADMIN — ATORVASTATIN CALCIUM 40 MG: 40 TABLET, FILM COATED ORAL at 09:54

## 2023-05-12 RX ADMIN — LIDOCAINE 5% 1 PATCH: 700 PATCH TOPICAL at 09:53

## 2023-05-12 RX ADMIN — ACETAMINOPHEN 975 MG: 325 TABLET ORAL at 06:06

## 2023-05-12 RX ADMIN — ASPIRIN 81 MG: 81 TABLET, COATED ORAL at 09:52

## 2023-05-12 RX ADMIN — LOSARTAN POTASSIUM 50 MG: 50 TABLET, FILM COATED ORAL at 09:52

## 2023-05-12 RX ADMIN — METOPROLOL SUCCINATE 100 MG: 100 TABLET, EXTENDED RELEASE ORAL at 09:52

## 2023-05-12 RX ADMIN — PANTOPRAZOLE SODIUM 20 MG: 20 TABLET, DELAYED RELEASE ORAL at 06:06

## 2023-05-12 RX ADMIN — HEPARIN SODIUM 5000 UNITS: 5000 INJECTION INTRAVENOUS; SUBCUTANEOUS at 06:06

## 2023-05-12 NOTE — ASSESSMENT & PLAN NOTE
· Last hemoglobin A1c of 5 8  · Mgmt per IM during [facility]   · Started on consistent carbohydrate 3 diet here in addition to cardiac diet on admission to [facility]

## 2023-05-12 NOTE — TREATMENT PLAN
Individualized Plan of [address] [facility] 58 y o  male MRN: [id number]  Unit/Bed#: -01 Encounter: [id number]     PATIENT INFORMATION  ADMISSION DATE: 5/12/2023  2:35 PM GARTH CATEGORY:Stroke:  01 4  No paresis   ADMISSION DIAGNOSIS: No admission diagnoses are documented for this encounter  EXPECTED LOS: 2 weeks     MEDICAL/FUNCTIONAL PROGNOSIS  Based on my assessment of the patient's medical conditions and current functional status, the prognosis for attaining medical and functional goals or the IRF stay is:  Fair    Medical Goals: Patient will be medically stable for discharge to [facility] upon completion of rehab program and Patient will be able to manage medical conditions and comorbid conditions with medications and follow up upon completion of rehab program    Cardiopulmonary function: Ensure cardiopulmonary stability and optimize cardiopulmonary function not only at rest but with activity as patient's activity level significantly increases in acute rehab compared with prior to transfer in preparation for safe discharge from [facility]  Must closely and frequently monitor blood pressure and HR to ensure adequate cardiac output during ADLs and ambulation as patient is at increased risk for orthostatic hypotension/syncope and potential injury if not monitored for and managed adequately  Blood pressure management:    Frequent monitoring of blood pressure with appropriate adjustments in blood pressure medication management to optimize blood pressure control and prevent/limit renal complications  Monitoring impact of blood pressure and side-effects of blood pressure medications at rest and with activity    Hypoxia prevention: Ensure appropriate level of oxygenation at rest and with activity to avoid symptomatic hypoxia, maximize functional performance, and decrease risk of atelectasis/pneumonia through close and frequent monitoring, providing appropriate respiratory treatments (such as incentive spirometry), and when necessary provide/adjust respiratory medications  DM: Patient will improve/maintain blood sugar control to ensure optimal healing and decrease risks of complications associated with DM through medication monitoring, adjustments as indicated, and optimal dietary intake and education  Pain management:  Pain will improve with frequent evaluation of pain, careful adjustments in medications, frequent re-evaluation of patient's pain and medical/neurologic status to ensure optimal pain control, avoidance of potential serious and even life-threatening side-effects and drug interactions, as well as weaning pain medications as soon as possible to decrease risk of short and long-term use  Neurologic Disorder: Multifocal strokes with history of multiple prior strokes causing impaired mobility, ADLs, and gait:  intensive skilled therapies with physical therapy and occupational therapy with close oversight and management by rehab specialized physician in acute rehabilitation setting to most expeditiously and effectively improve functional mobility, transfers, upper and lower body strengthening, conditioning, balance, and gait training with appropriate assistive device  Patient will have optimal supervision and management of patient's underlying neurologic disorder with specialized rehabilitation physician during this period of recovery to ensure most expeditious and optimal recovery with decreased risks of fall/injury and other complications including acute worsening of neuro disorder, decrease risk of VTE, PNA, and skin ulceration  Cognitive deficits following cerebrovascular disease: intensive skilled therapies including speech language pathology to evaluate and treat deficits in cognition    Close oversight and management by rehab specialized physician of cognitive deficits and potential confounding factors (prevention of, monitoring for, and if found treatment of possible complications such as infections, as well as optimally managing sleep, mood, and meications which can negatively impact cognition and functional recovery)  Inpatient rehabilitation education/teaching: To be provided to patient and typically family/caregiver (if able to be identified) by all skilled therapists, rehab nursing, case management, and rehab specialized physician to ensure optimal recovery and decrease risks of complications in both acute rehabilitation setting as well as after discharge  Anxiety (and/or Depression): Patient's mood and it's impact on therapy participation and functional recovery will improve during course with supportive counseling, relaxation/breathing techniques and if necessary medication management  Requires frequent re-assessment and close management to ensure anxiety/depression management during acute rehab course with planning for appropriate outpatient management to ensure optimal mental health and functional recovery  Electrolyte abnormality: hypokalemia:  placing patient at risk for additional complications which may impact medical stability and functional recovery  Frequent measurement and monitoring of labs by with appropriate adjustments in medication and/or fluid management by rehabilitation physician and internal medicine consultant  Acute indwelling jo management: Appropriate urinary retention management and voiding protocols which include close monitoring and evaluation of bladder scans/post-void residuals once jo removed, toileting program, and monitor voided urinary output with goal of expeditious but safe and appropriate removal of indwelling jo catheter, improved urinary retention and risks associated with retention some of which include infection, bladder stretch injury, and kidney injury  Adjustments in medications are common and will be provided if indicated as well     Bladder dysfunction:  Appropriate bladder management with appropriate toileting program from rehab nursing and staff with oversight management by rehabilitation physicians which include appropriate monitoring and possible adjustments in medications to with goals to optimize bladder function and decrease risk of bladder retention, incontinence, and urinary tract infection  Bowel dysfunction: Appropriate bowel management with appropriate toileting program from rehab nursing and staff with oversight management by rehabilitation physicians which include adjustments in medications to optimize appropriate bowel function and prevent/decrease risk of constipation and bowel obstruction  Obesity:  Close monitoring of nutrition status, nutrition specialist with adjustments in diet   on appropriate short and long term nutrition and activity  Obesity increases complexity of patient's overall condition and causes unique challenges during this part of patient's recovery process  Supervise and if necessary make adjustments in rehab nursing care and skilled therapy care to ensure appropriate toileting, bed mobility, other ADLs, and ambulation to decrease risk of falls/injuries, VTE, skin breakdown/ulceration and optimize functional recovery  ANTICIPATED DISCHARGE DISPOSITION AND SERVICES  COMMUNITY SETTING: Home - with supervision     ANTICIPATED FOLLOW-UP SERVICE:   Home Health Services: PT, OT, SLP and Nursing    DISCIPLINE SPECIFIC PLANS:  Required Disciplines & Services: Rehabillitation Nursing, Case Management, Diabetes Education and Psychology      REQUIRED THERAPY:  Therapy Hours per Day Days per Week Total Days   Physical Therapy 1 5-6 14   Occupational Therapy 1 5-6 14   Speech/Language Therapy 1 3-5 14   NOTE: Additional therapy time(s) or changes to allocation of therapies as appropriate to meet patient needs and to achieve functional goals        Patient will participate in above therapy regimen consisting of PT, OT and SLP due to the following medical procedure/condition:Stroke:  01 4  No paresis    ANTICIPATED FUNCTIONAL OUTCOMES:  ADL:  Modified independent to supervision   Bladder/Bowel:  Modified independent   Transfers:  Modified independent to supervision   Locomotion:  Modified independent to supervision   Cognitive:  Supervision to min assist     DISCHARGE PLANNING NEEDS  Equipment needs: Discharge needs to be reviewed with team      REHAB ANTICIPATED PARTICIPATION RESTRICTIONS:  Amubulate Short Distances Only, Assist with Bathing Shower, Assist with Bathing in Tub, Assist with Mobility, Assist with Tub Shower Transfer, Decreased Insight to Deficits, Difficulty Expressing Basic Needs, Inability to Drive, Rquires Assist with ADLS, Requires Assit with Homemaking and Requires Assit with Hoang Fenton, DO  Physical Medicine and [place]

## 2023-05-12 NOTE — ASSESSMENT & PLAN NOTE
- Urinary retention s/p jo removal 6/5 - now with incontinence and difficulty controlling urination but overall low bladder scans except 1 slightly elevated;  Recommend condom cath and continuing bladder scans Q4H with SC>450 cc; decrease flomax back to 0 4mg - Overall mgmt per SNF provider after d/c and OP urology f/u   - Possible Enterococcus -UTI    - IM recommended Levaquin 750mg qday x5 d thru 6/5 completed to cover complicated UTI; leukocytosis resolved    - Patient had functional decline and increased significant LE spasms - may have been in part related to subacute CVA but did grow >100K Enterococcus   - Urology c/s 5/19 for ongoing and recurrent retention as well as difficulty to place jo  - Monitor for infection and retention  - Optimal bowel mgmt, mobilize  - OP urology follow-up

## 2023-05-12 NOTE — CASE MANAGEMENT
Nancy Medina 49 has received approved authorization from insurance:   Nay Chan 6 received for: Acute Rehab  Facility: [address] [place] [address] #:[id number]  Start of Care:5/12/23  Next Review Date:5/19/23  Continued Stay Care Coordinator:Echo VEGAS#: [phone] [facility]  [phone]  Submit next review via [address] notified:Tena Lane

## 2023-05-12 NOTE — PLAN OF CARE
Problem: PAIN - ADULT  Goal: Verbalizes/displays adequate comfort level or baseline comfort level  Description: Interventions:  - Encourage patient to monitor pain and request assistance  - Assess pain using appropriate pain scale  - Administer analgesics based on type and severity of pain and evaluate response  - Implement non-pharmacological measures as appropriate and evaluate response  - Consider cultural and social influences on pain and pain management  - Notify physician/advanced practitioner if interventions unsuccessful or patient reports new pain  Outcome: Adequate for Discharge     Problem: INFECTION - ADULT  Goal: Absence or prevention of progression during hospitalization  Description: INTERVENTIONS:  - Assess and monitor for signs and symptoms of infection  - Monitor lab/diagnostic results  - Monitor all insertion sites, i e  indwelling lines, tubes, and drains  - Monitor endotracheal if appropriate and nasal secretions for changes in amount and color  - [place] appropriate cooling/warming therapies per order  - Administer medications as ordered  - Instruct and encourage patient and family to use good hand hygiene technique  - Identify and instruct in appropriate isolation precautions for identified infection/condition  Outcome: Adequate for Discharge     Problem: SAFETY ADULT  Goal: Patient will remain free of falls  Description: INTERVENTIONS:  - Educate patient/family on patient safety including physical limitations  - Instruct patient to call for assistance with activity   - Consult OT/PT to assist with strengthening/mobility   - Keep Call bell within reach  - Keep bed low and locked with side rails adjusted as appropriate  - Keep care items and personal belongings within reach  - Initiate and maintain comfort rounds  - Make Fall Risk Sign visible to staff  - Offer Toileting every 2 Hours, in advance of need  - Initiate/Maintain bed/chair alarm  - Obtain necessary fall risk management equipment: alarm  - Apply yellow socks and bracelet for high fall risk patients  - Consider moving patient to room near nurses station  Outcome: Adequate for Discharge     Problem: DISCHARGE PLANNING  Goal: Discharge to home or other facility with appropriate resources  Description: INTERVENTIONS:  - Identify barriers to discharge w/patient and caregiver  - Arrange for needed discharge resources and transportation as appropriate  - Identify discharge learning needs (meds, wound care, etc )  - Arrange for interpretive services to assist at discharge as needed  - Refer to Case Management Department for coordinating discharge planning if the patient needs post-hospital services based on physician/advanced practitioner order or complex needs related to functional status, cognitive ability, or social support system  Outcome: Adequate for Discharge     Problem: Knowledge Deficit  Goal: Patient/family/caregiver demonstrates understanding of disease process, treatment plan, medications, and discharge instructions  Description: Complete learning assessment and assess knowledge base  Interventions:  - Provide teaching at level of understanding  - Provide teaching via preferred learning methods  Outcome: Adequate for Discharge     Problem: NEUROSENSORY - ADULT  Goal: Achieves stable or improved neurological status  Description: INTERVENTIONS  - Monitor and report changes in neurological status  - Monitor vital signs such as temperature, blood pressure, glucose, and any other labs ordered   - Initiate measures to prevent increased intracranial pressure  - Monitor for seizure activity and implement precautions if appropriate      Outcome: Adequate for Discharge  Goal: Achieves maximal functionality and self care  Description: INTERVENTIONS  - Monitor swallowing and airway patency with patient fatigue and changes in neurological status  - Encourage and assist patient to increase activity and self care     - Encourage visually impaired, hearing impaired and aphasic patients to use assistive/communication devices  Outcome: Adequate for Discharge     Problem: CARDIOVASCULAR - ADULT  Goal: Maintains optimal cardiac output and hemodynamic stability  Description: INTERVENTIONS:  - Monitor I/O, vital signs and rhythm  - Monitor for S/S and trends of decreased cardiac output  - Administer and titrate ordered vasoactive medications to optimize hemodynamic stability  - Assess quality of pulses, skin color and temperature  - Assess for signs of decreased coronary artery perfusion  - Instruct patient to report change in severity of symptoms  Outcome: Adequate for Discharge  Goal: Absence of cardiac dysrhythmias or at baseline rhythm  Description: INTERVENTIONS:  - Continuous cardiac monitoring, vital signs, obtain 12 lead EKG if ordered  - Administer antiarrhythmic and heart rate control medications as ordered  - Monitor electrolytes and administer replacement therapy as ordered  Outcome: Adequate for Discharge     Problem: METABOLIC, FLUID AND ELECTROLYTES - ADULT  Goal: Electrolytes maintained within normal limits  Description: INTERVENTIONS:  - Monitor labs and assess patient for signs and symptoms of electrolyte imbalances  - Administer electrolyte replacement as ordered  - Monitor response to electrolyte replacements, including repeat lab results as appropriate  - Instruct patient on fluid and nutrition as appropriate  Outcome: Adequate for Discharge  Goal: Glucose maintained within target range  Description: INTERVENTIONS:  - Monitor Blood Glucose as ordered  - Assess for signs and symptoms of hyperglycemia and hypoglycemia  - Administer ordered medications to maintain glucose within target range  - Assess nutritional intake and initiate nutrition service referral as needed  Outcome: Adequate for Discharge     Problem: MOBILITY - ADULT  Goal: Maintain or return to baseline ADL function  Description: INTERVENTIONS:  -  Assess patient's ability to carry out ADLs; assess patient's baseline for ADL function and identify physical deficits which impact ability to perform ADLs (bathing, care of mouth/teeth, toileting, grooming, dressing, etc )  - Assess/evaluate cause of self-care deficits   - Assess range of motion  - Assess patient's mobility; develop plan if impaired  - Assess patient's need for assistive devices and provide as appropriate  - Encourage maximum independence but intervene and supervise when necessary  - Involve family in performance of ADLs  - Assess for home care needs following discharge   - Consider OT consult to assist with ADL evaluation and planning for discharge  - Provide patient education as appropriate  Outcome: Adequate for Discharge  Goal: Maintains/Returns to pre admission functional level  Description: INTERVENTIONS:  - Perform BMAT or MOVE assessment daily    - Set and communicate daily mobility goal to care team and patient/family/caregiver  - Collaborate with rehabilitation services on mobility goals if consulted  - Perform Range of Motion 4 times a day  - Reposition patient every 2 hours    - Dangle patient 2 times a day  - Stand patient 2 times a day  - Ambulate patient 2 times a day  - Out of bed to chair 2 times a day   - Out of bed for meals 2 times a day  - Out of bed for toileting  - Record patient progress and toleration of activity level   Outcome: Adequate for Discharge     Problem: Prexisting or High Potential for Compromised Skin Integrity  Goal: Skin integrity is maintained or improved  Description: INTERVENTIONS:  - Identify patients at risk for skin breakdown  - Assess and monitor skin integrity  - Assess and monitor nutrition and hydration status  - Monitor labs   - Assess for incontinence   - Turn and reposition patient  - Assist with mobility/ambulation  - Relieve pressure over bony prominences  - Avoid friction and shearing  - Provide appropriate hygiene as needed including keeping skin clean and dry  - Evaluate need for skin moisturizer/barrier cream  - Collaborate with interdisciplinary team   - Patient/family teaching  - Consider wound care consult   Outcome: Adequate for Discharge     Problem: Nutrition/Hydration-ADULT  Goal: Nutrient/Hydration intake appropriate for improving, restoring or maintaining nutritional needs  Description: Monitor and assess patient's nutrition/hydration status for malnutrition  Collaborate with interdisciplinary team and initiate plan and interventions as ordered  Monitor patient's weight and dietary intake as ordered or per policy  Utilize nutrition screening tool and intervene as necessary  Determine patient's food preferences and provide high-protein, high-caloric foods as appropriate       INTERVENTIONS:  - Monitor oral intake, urinary output, labs, and treatment plans  - Assess nutrition and hydration status and recommend course of action  - Evaluate amount of meals eaten  - Assist patient with eating if necessary   - Allow adequate time for meals  - Recommend/ encourage appropriate diets, oral nutritional supplements, and vitamin/mineral supplements  - Order, calculate, and assess calorie counts as needed  - Recommend, monitor, and adjust tube feedings and TPN/PPN based on assessed needs  - Assess need for intravenous fluids  - Provide specific nutrition/hydration education as appropriate  - Include patient/family/caregiver in decisions related to nutrition  Outcome: Adequate for Discharge

## 2023-05-12 NOTE — QUICK NOTE
Called wife on both numbers and only Voicemail, left information about patient arriving to [facility] and room number

## 2023-05-12 NOTE — PLAN OF CARE
Problem: PAIN - ADULT  Goal: Verbalizes/displays adequate comfort level or baseline comfort level  Description: Interventions:  - Encourage patient to monitor pain and request assistance  - Assess pain using appropriate pain scale  - Administer analgesics based on type and severity of pain and evaluate response  - Implement non-pharmacological measures as appropriate and evaluate response  - Consider cultural and social influences on pain and pain management  - Notify physician/advanced practitioner if interventions unsuccessful or patient reports new pain  Outcome: Progressing     Problem: SAFETY ADULT  Goal: Patient will remain free of falls  Description: INTERVENTIONS:  - Educate patient/family on patient safety including physical limitations  - Instruct patient to call for assistance with activity   - Consult OT/PT to assist with strengthening/mobility   - Keep Call bell within reach  - Keep bed low and locked with side rails adjusted as appropriate  - Keep care items and personal belongings within reach  - Initiate and maintain comfort rounds  - Make Fall Risk Sign visible to staff  - Offer Toileting every 2 Hours, in advance of need  - Initiate/Maintain bed alarm  - Obtain necessary fall risk management equipment: socks  Problem: NEUROSENSORY - ADULT  Goal: Achieves stable or improved neurological status  Description: INTERVENTIONS  - Monitor and report changes in neurological status  - Monitor vital signs such as temperature, blood pressure, glucose, and any other labs ordered   - Initiate measures to prevent increased intracranial pressure  - Monitor for seizure activity and implement precautions if appropriate      Outcome: Progressing  Goal: Achieves maximal functionality and self care  Description: INTERVENTIONS  - Monitor swallowing and airway patency with patient fatigue and changes in neurological status  - Encourage and assist patient to increase activity and self care     - Encourage visually impaired, hearing impaired and aphasic patients to use assistive/communication devices  Outcome: Progressing     - Apply yellow socks and bracelet for high fall risk patients  - Consider moving patient to room near nurses station  Outcome: Progressing

## 2023-05-12 NOTE — ASSESSMENT & PLAN NOTE
S/p left ICA angioplasty with stent placement with neurovascular  Continue Brilinta  Aspirin, statin  Outpatient follow-up

## 2023-05-12 NOTE — ASSESSMENT & PLAN NOTE
Abdominal imaging revealed 3 4 fusiform infrarenal abdominal arctic aneurysm  Smoking cessation strongly encouraged  Outpatient follow-up with serial abdominal ultrasounds  Continue atorvastatin

## 2023-05-12 NOTE — ASSESSMENT & PLAN NOTE
· Recent left MCA stroke in the beginning of April at 4/16/2023 with aphasia and was empirically treated with Eliquis 5 mg twice daily as well as aspirin 81 mg due to embolic stroke of undetermined source per prior documentation  · Despite this he presented with additional strokes for this admission > see mgmt above

## 2023-05-12 NOTE — DISCHARGE SUMMARY
[address]  Discharge- Babin Pillion 1960, 58 y o  male MRN: [id number]  Unit/Bed#: [facility] 733-01 Encounter: [id number]  Primary Care Provider: ERIC Garcia   Date and time admitted to hospital: 4/26/2023  3:30 PM    * Stroke [facility])  Assessment & Plan  Presented to the ER as a stroke alert with history of known severe R M1 stenosis, prior tobacco use, recent L MCA stroke (residual aphasia) 04/16/2023 & came in as a Stroke alert on 4/26/2023  NIHSS of 2  Initial presenting deficits were L facial droop, R sided weakness, and AMS  As an inpatient patient developed even more neuro sxs including   R homonymous hemianopsia, Rightward leaning with ambulation    · IVY on 5/2 showed EF 60% with no PFO  · MRI on 5/2 showed left parietal stroke that appeared larger  · S/p cerebral angiogram with left ICA angioplasty and stent placement  · He is placed on Brilinta 4/27/2023  · Continue with aspirin and statin  · He will be transferred to acute rehab today  · Physical therapy    Abdominal aortic aneurysm (AAA) ([facility] [address] )  Assessment & Plan  Abdominal imaging revealed 3 4 fusiform infrarenal abdominal arctic aneurysm  Smoking cessation strongly encouraged  Outpatient follow-up with serial abdominal ultrasounds  Continue atorvastatin      Prediabetes  Assessment & Plan  · A1c 5 8  · Resume metformin at discharge  · Carb consistent diet    Hyponatremia  Assessment & Plan  · Resolved    Chronic low back pain  Assessment & Plan  · Patient with history of chronic low back pain  · Reports improvement  · X-ray revealed degenerative changes  · MRI spine revealed multilevel spondylosis  · Continue Tylenol 975 g every 8 hours  · Lidocaine patch  · Supportive cares    Chronic ischemic right MCA stroke  Assessment & Plan  · History of stroke noted  · Continue atorvastatin, aspirin    Adjustment reaction with anxiety  Assessment & Plan  · Continue Celexa    Urinary retention  Assessment & Plan  Patient with urine retention requiring Cortez catheter  Continue Flomax  Voiding trial at rehab as ambulation improves      Bilateral carotid artery stenosis  Assessment & Plan  S/p left ICA angioplasty with stent placement with neurovascular  Continue Brilinta  Aspirin, statin  Outpatient follow-up      Nicotine dependence  Assessment & Plan  · Walking cessation encouraged    Primary hypertension  Assessment & Plan  Blood pressures reviewed  His home medications amlodipine and hydralazine discontinued  Hydrochlorothiazide reduced to 12 5 mg daily  He will continue with losartan 50 mg daily and metoprolol  mg daily  Monitor blood pressures  Follow-up with primary care physician                Discharge Summary - [facility]  Internal Medicine    Patient Information: Tim Crowder 58 y o  male MRN: [id number]  [facility] Encounter: [id number]    Discharging Physician / Practitioner: Fausto Umanzor MD  PCP: Eyal Mendoza  Admission Date: 4/26/2023  Discharge Date: 05/12/23    Disposition:     Other: Acute rehab    Reason for Admission: Change in mental status    Discharge Diagnoses:     Principal Problem:    Stroke [facility])  Active Problems:    Primary hypertension    Nicotine dependence    Bilateral carotid artery stenosis    Urinary retention    Adjustment reaction with anxiety    Chronic ischemic right MCA stroke    Chronic low back pain    Hyponatremia    Prediabetes    Tachycardia    Abdominal aortic aneurysm (AAA) ([address] )  Resolved Problems:    * No resolved hospital problems  *      Consultations During Hospital Stay:  · Neurosurgery  · Neurology    Procedures Performed:     · CT head  No acute intracranial abnormality  Chronic microangiopathy   Small evolving subacute infarct in the left parietal periventricular white matter    MRI brain  Compared to the prior recent MRI examination dated 4/17/2023, interval increase in size of diffusion abnormality in the left parietal "subcortical and periventricular white matter consistent with acute to subacute ischemia       New small focus of gyral diffusion abnormality in the right frontal parietal region (4:20)     Persistent additional small foci of diffusion abnormality in the left periatrial and parieto-occipital region      No acute hemorrhage or midline shift      Stable moderate chronic microangiopathic changes within the brain  Chest x-ray no active disease      Procedure:  Right Common Carotid Arteriogram  Left Common Carotid Arteriogram  Left ICA angioplasty and stenting with distal protection device  Intraprocedural and postprocedural arteriography  Limited Right Femoral Arteriogram      Hospital Course:     Radha Rachel is a 58 y o  male patient who originally presented to the hospital on 4/26/2023 due to altered mental status  Patient on admission with a stroke alert he was evaluated by neurology and neurosurgery  Imaging studies concerning for bilateral ICA stenosis, MRI revealed left parietal stroke neurosurgery evaluated the patient his disease process was likely due to atherosclerotic carotid disease he underwent angiogram with angioplasty and stent placement  He has been placed on Brilinta  We will continue aspirin and statin  He symptomatically improving hemodynamically stable and is deemed ready for discharge to acute rehab  Kindly review the chart for details          Condition at Discharge: fair     Discharge Day Visit / Exam:     Subjective:      Comfortably in bed  Reports feeling okay  Agreeable to discharge plan    Vitals: Blood Pressure: 131/58 (05/12/23 0720)  Pulse: 60 (05/12/23 0720)  Temperature: 98 4 °F (36 9 °C) (05/12/23 0720)  Temp Source: Oral (05/10/23 0725)  Respirations: 16 (05/12/23 0720)  Height: 5' 8\" (172 7 cm) (05/02/23 1436)  Weight - Scale: 103 kg (227 lb) (05/02/23 1436)  SpO2: 95 % (05/12/23 0720)  Exam:   Physical Exam    Comfortably in bed  Obese  Short thick neck  Lungs " diminished breath sounds bilateral  Heart sounds S1 and S2 noted  Abdomen soft nontender  Awake obey simple commands  No edema  No rash    Discharge instructions/Information to patient and family:   See after visit summary for information provided to patient and family  Discharge plan discussed the patient, updated spouse Jennifer Howard follow-up with neurology, primary care physician    Provisions for Follow-Up Care:  See after visit summary for information related to follow-up care and any pertinent home health orders  Planned Readmission: no     Discharge Statement:  I spent 45 minutes discharging the patient  This time was spent on the day of discharge  I had direct contact with the patient on the day of discharge  Greater than 50% of the total time was spent examining patient, answering all patient questions, arranging and discussing plan of care with patient as well as directly providing post-discharge instructions  Additional time then spent on discharge activities  Discharge Medications:  See after visit summary for reconciled discharge medications provided to patient and family        ** Please Note: This note has been constructed using a voice recognition system **

## 2023-05-12 NOTE — ASSESSMENT & PLAN NOTE
Blood pressures reviewed  His home medications amlodipine and hydralazine discontinued  Hydrochlorothiazide reduced to 12 5 mg daily  He will continue with losartan 50 mg daily and metoprolol  mg daily  Monitor blood pressures  Follow-up with primary care physician

## 2023-05-12 NOTE — ASSESSMENT & PLAN NOTE
· Status post left ICA angioplasty and stent placement neurovascular service  · Neuro c/s and f/u during [facility] course - they also spoke with NSx   · Per IM- S/p L ICA PTA/stent 5/4/23;  Recheck carotid doppler 6 weeks and see NS - has OP follow-up with Dr Edmund De Los Santos 6/26  · Brilinta, aspirin, pletal and statin  · Optimal BP mgmt

## 2023-05-12 NOTE — RESTORATIVE TECHNICIAN NOTE
Restorative Technician Note      Patient Name: Laney Mayen     Note Type: Mobility  Patient Position Upon Consult: Supine  Activity Performed: Ambulated; Dangled; Stood  Assistive Device: Other (Comment) (Assist x2 OOB to the chair )  Education Provided: Yes  Patient Position at End of Consult: Bedside chair;  All needs within reach; Bed/Chair alarm activated    Krystal REYNA, Restorative Technician, [employer]

## 2023-05-12 NOTE — ASSESSMENT & PLAN NOTE
· Hx of multiple strokes and some cog deficits and residual aphasia/apraxia  · He does still have some aphasia and apraxia but improving   · Neuropsych eval 6/1  · However, patient appeared to understand the concept of Power of  and on three occasions clearly stated that his wife serve in this capacity  During this encounter, patient appears to have capacity to make informed decisions regarding the appointment of POA    · I am in agreement that in spite of patient having some cog deficits he seems to understanding and appreciate concept of POA and his needs and options regarding this and elects to choose his wife with acceptable level of capacity for this specific issue at this time  · Started on Aricept due to memory difficulties after stroke in 2019  · Currently on 5 mg twice daily  · Sees Dr Destini Ellison in outpatient neurology

## 2023-05-12 NOTE — ASSESSMENT & PLAN NOTE
"Recent multifocal ischemic infarcts in different distributions of unclear etiology   · Imaging revealed multifocal strokes; underwent L ICA PTA/stenting 5/4 also hx of R ICA stenosis   · Seen by neuro and NSx and eventually switched from his Eliquis/aspirin to aspirin and Monnie Rinne was felt not to be cardioembolic by neurosurgery  Raul Berkowitz was a question of vasculitis as the etiology but Neurosurgery saw him in consult and felt that it was likely related to atherosclerotic and carotid disease and they stopped the Eliquis and placed back on ASA  Neuro did not feel he needed to go back on full A/C as well  · Additional possible new subacute stroke found on repeat imaging 5/29-5/30 > Pletal was added to aspirin and Brilinta  · Continue Aspirin 81mg qday, Brilinta 90mg qday, and Pletal 100mg BID  · Continue Lipitor 40mg qday and optimal BP mgmt   · Has loop recorder placed 2019 and neuro wants OP replacement of loop recorder - OP cards f/u   · Thrombosis panel done in 04/18/2023 that was only abnl for AT3 that was low in the acute setting and needs to be repeated in future  Repeat OP thrombosis panel thru neuro or hematology  · Follow-up with OP Neurology and OP NeuroSx Dr Isael Mathew  · CT of the chest abdomen pelvis without evidence of malignancy done on the last admission on 4/17  · Thyroid nodules - with US recommending follow-up biopsy - follow-up with PCP/SNF provider to arrange biopsy within next 2-4 weeks and referral to endo or other provider at their discretion  · A-gram not c/w vasculitis   · Recent IVY with no PFO  · Previous TTE 04/18/23 showed ED of 55% and nl wall motion and mildly dilated L atrium  · Some functional gains but needs additional PT, OT, ST and recovery time in subacute setting prior to returning home    Neuro f/u 6/1 - \"additional ischemia within the L parieto-occipital region consistent with the area of prior ischemia  Case was further discussed with Neuro IR as well   CTA was reviewed and " stent appears patent with only mild stenosis present  Recurrent L hemispheric infarction in the setting of multiple prior anterior circulation infarcts and longstanding b/l intracranial ICA disease now s/p L ICA stenting, which is patent  This could represent evolving ischemia from his prior insult or perhaps new injury - this is unclear  Given he has failed multiple prior medical trials, feel benefit of maximizing his medical regimen as much is possible outweighs potential risk  We will add Pletal at this point to his ASA and Brillinta  5/31 - CTA H/N - CT brain: Subacute ischemia within the left hemisphere without mass effect or taiwo hemorrhagic transformation  Advanced chronic microangiopathic change within the brain parenchyma  CT angiogram: Stable atherosclerotic disease of the right carotid bifurcation including the ICA origin and midportion of the bulb, approximately 60 to 70% at the point of maximal stenosis  A carotid stent has been placed on the left with only mild residual narrowing within the proximal internal carotid artery  Stable severe stenosis of the distal right M1 segment with decreased size and number of M2 branches  5/30 - MRI Brain - Similar areas of left parietal occipital recent ischemia/infarct  Some new areas of acute to subacute left parietal/occipital acute to subacute ischemia  Findings suggesting normal pressure hydrocephalus  5/29 - patient reported feeling off that may have started 5/28 - like he did when he had prior CVA with some worsening speech but gross neuro exam fairly stable; felt more tired   5/29 - CTH -  Slight interval progression and extension of the nonhemorrhagic acute to early subacute left cerebral infarction noted on the recent MRI study  Mild regional sulcal crowding but no significant mass effect on the underlying ventricular system or evidence of herniation    Redemonstrated disproportionate ventriculomegaly to sulcal prominence which can be seen in patients with normal pressure hydrocephalus  Background cerebral white matter changes consistent with moderate chronic microangiopathic disease  5/2 MRI brain   1  Incomplete exam since patient was not able to finish the study  Axial diffusion and T2 sequences and sagittal T1 sequence were obtained  2   Worsened recent infarcts with new areas of acute ischemia in similar distribution involving left parietotemporal and occipital periventricular and deep white matter, and to lesser degree cortices/subcortical white matter  Associated edema with mild   regional mass effect  There is no T1 hyperintense hemorrhage  Cannot evaluate for petechial hemorrhage (gradient susceptibility sequence not obtained)  3  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

## 2023-05-12 NOTE — ASSESSMENT & PLAN NOTE
"· Patient with history of right MCA stroke back in March 2019 status post thrombectomy  · Had loop recorder placement without overt arrhythmia and was on Plavix 75 mg at that time    Also with known severe right M1 stenosis  · See \"Stroke\" mgmt above     "

## 2023-05-12 NOTE — CONSULTS
Internal Medicine Consultation Note    Patient: Jacquie Lundberg  Age/sex: 58 y o  male  Medical Record #: [id number]      Assessment/Plan:    Acute multifocal ischemic strokes  · Occurred in different arterial distributions  · IVY was negative for thrombus/PFO  · Felt not to be vasculitis and negative by a-gram 5/4/23  · Continue ASA/Brilinta  · Continue statin    Left ICA stenosis  · Was noted to have all of the CVAs in last month have been in left anterior circulation  · S/p PTA/stent 5/4/23  · Recheck carotid doppler 6 weeks and see NS  · Continue AP/statin    Right ICA stenosis  · To followup with Vasc surgery as OP  · Continue AP/statin    LOOP recorder  · Was placed 3/2019  · Neuro wants it replaced = for OP to Cardiology    Leukocytosis  · Had no fevers  · CXR and Procal were normal  · Last CBC was done 5/6/23  · For CBC in AM    HTN  · Home:  Toprol 100mg qd/Norvasc 10mg qd/Losartan 50mg qd/HCTZ 25mg qd/Hydralazine 25mg TID  · Here:  Toprol 100mg qd/Losartan 50mg qd/HCTZ 12 5mg qd  · HCTZ was decreased to 12 5mg qd today before transfer here to [facility]    Pre DM  · HbA1C 5 8  · Takes Metformin at home but currently not on it in hospital  · Wife didn't want him to have Accuchecks noting that he was pre-DM so they were stopped in hospital   The BSs had been very normal in acute anyway  · Will watch fastings with BMPs  · For DM diet which he had not been on in acute  · Dr Purvi Amado spoke with him about that and he was ok with that plan    Depression  · Continue celexa as at home    Memory loss  · Continue Aricept as at home  · He has had memory issues since his CVA in 2019    Urine retention  · Has jo  · VT per PMR  · Continue Flomax    Chronic LBP  · MRI showed advanced multilevel spondylosis, slightly progressed on the right at L2-3 compared to the prior study but otherwise stable     · Pain management per PMR    AAA  · Found on l-spine MRI  · Measures 3 4 cm  · OP followup      Discharge date:  Team "    Subjective/HPI:     Faraz Lewis is a 58year old patient with a history of pre-DM, CVA x 2, LOOP recorder placed 3/2019, HTN, carotid stenosis, depression and hypertensive encephalopathy x 2 (1/2023 and 4/2023) who was admitted with left facial droop, right sided weakness and aphasia  In 3/2019, he had a right MCA CVA and had a right MCA thrombectomy  He was placed on ASA/Plavix  IVY at the time was unrevealing for source  He had a LOOP placed to help rule out cardioembolic etiology  On 4/16/23, he sustained a left MCA CVA  This was felt to be embolic and Eliquis was added to ASA  Atrial fibrillation has not been identified on the LOOP interrogations as an outpatient  Now with current admission, he had a CT head that showed \"No acute intracranial abnormality  Chronic microangiopathy  Small evolving subacute infarct in the left parietal periventricular white matter  Other small recent infarcts better visualized on previous MRI\"  CTA of the head/neck showed \"Stable moderate (60 to 65%) stenosis in the bilateral internal carotid arteries  Stable severe stenosis distal right M1 segment  Stable severe stenosis proximal left M2  No new intracranial stenosis or large vessel occlusion\"  MRI w/o gadolinium showed \"Compared to the prior recent MRI examination dated 4/17/2023, interval increase in size of diffusion abnormality in the left parietal subcortical and periventricular white matter consistent with acute to subacute ischemia  New small focus of gyral diffusion abnormality in the right frontal parietal region (4:20)  Persistent additional small foci of diffusion abnormality in the left periatrial and parieto-occipital region  No acute hemorrhage or midline shift\"  Neurology saw in consult  Carotid doppler showed LICA 21-72%/NLUZ <10%  IVY was done and did not show any PFO or thrombus  Given that patient was on Plavix when he had his stroke on 04/16/2023, he was placed on Brilinta    There " was a question of vasculitis as the etiology but Neurosurgery saw him in consult and felt that it was likely related to atherosclerotic and carotid disease and they stopped the Eliquis and placed back on ASA  He had a cerebral arteriogram on 5/4 23 with a left ICA PTA/stent placement  There was no vasculitis noted  Per Neurology full anticoagulation did not need to be restarted  During his stay, he had some persistent tachycardia  This resolved with IV hydration  Patient is now in [facility] for inpatient acute rehabilitation and we are ask to assist with medical management  Currently there are no complaints of CP, SOB, dizziness, N/V/D        ROS:   A 10 point ROS was performed; negative except as noted above       Social History:    Substance Use History:   Social History     Substance and Sexual Activity   Alcohol Use Not Currently     Social History     Tobacco Use   Smoking Status Former   Smokeless Tobacco Never     Social History     Substance and Sexual Activity   Drug Use Never       Family History:    Family History   Problem Relation Age of Onset   • Heart attack Mother    • Diabetes Mother    • Prostate cancer Father          Review of Scheduled Meds:  Current Facility-Administered Medications   Medication Dose Route Frequency Provider Last Rate   • acetaminophen  975 mg Oral Q8H [address], DO     • [START ON 5/13/2023] aspirin  81 mg Oral Daily Eng Lee, DO     • [START ON 5/13/2023] atorvastatin  40 mg Oral Daily Eng Lee, DO     • bisacodyl  10 mg Rectal Daily PRN Eng Lee, DO     • calcium carbonate  1,000 mg Oral Daily PRN Eng Lee, DO     • [START ON 5/13/2023] citalopram  20 mg Oral Daily Eng Lee, DO     • donepezil  5 mg Oral BID Eng Lee, DO     • heparin (porcine)  5,000 Units Subcutaneous [facility] Eng Lee, DO     • [START ON 5/13/2023] hydrochlorothiazide  12 5 mg Oral Daily Eng Lee, DO     • HYDROcodone-acetaminophen  1 tablet Oral Q6H PRN Louisa Bryant, DO     • [START ON 2023] lidocaine  1 patch Topical Daily Louisa Bryant, DO     • [START ON 2023] losartan  50 mg Oral Daily Louisa Bryant, DO     • methocarbamol  500 mg Oral Q6H PRN Louisa Bryant, DO     • [START ON 2023] metoprolol succinate  100 mg Oral Daily Louisa Bryant, DO     • ondansetron  4 mg Oral Q6H PRN Louisa Bryant, DO     • oxyCODONE  10 mg Oral Q6H PRN Louisa Bryant, DO     • oxyCODONE  5 mg Oral Q6H PRN Louisa Bryant, DO     • [START ON 2023] pantoprazole  20 mg Oral Early Morning Louisa Bryant, DO     • [START ON 2023] polyethylene glycol  17 g Oral QAM Louisa Bryant DO     • senna  2 tablet Oral Daily PRN Louisa Bryant, DO     • senna-docusate sodium  2 tablet Oral BID Louisa Bryant, DO     • tamsulosin  0 4 mg Oral Daily With Dinner Louisa Bryant, DO     • ticagrelor  90 mg Oral Q12H [facility] Louisa Bryant DO         Labs:     Results from last 7 days   Lab Units 23  0652   WBC Thousand/uL 13 88*   HEMOGLOBIN g/dL 12 1   HEMATOCRIT % 35 7*   PLATELETS Thousands/uL 257     Results from last 7 days   Lab Units 23  0652   SODIUM mmol/L 136   POTASSIUM mmol/L 3 7   CHLORIDE mmol/L 108   CO2 mmol/L 24   BUN mg/dL 25   CREATININE mg/dL 0 77   CALCIUM mg/dL 9 0                Results from last 7 days   Lab Units 23  1642 23  1521 23  1053   POC GLUCOSE mg/dl 112 198* 142*       Lab Results   Component Value Date    URINECX No Growth <1000 cfu/mL 2023       Input and Output Summary (last 24 hours):     No intake or output data in the 24 hours ending 23 1730    Imaging:     No orders to display       *Labs /Radiology studies reviewed  *Medications reviewed and reconciled as needed  *Please refer to order section for additional ordered labs studies  *Case discussed with primary attending during morning huddle case rounds    Vitals:   Temp (24hrs), Av 4 °F (36 9 °C), Min:98 2 °F (36 8 °C), Max:98 6 °F (37 °C)    Temp: [98 2 °F (36 8 °C)-98 6 °F (37 °C)] 98 6 °F (37 °C)  HR:  [60-77] 67  Resp:  [16-18] 18  BP: (118-134)/(55-65) 118/55  SpO2:  [94 %-96 %] 96 %  Body mass index is 32 78 kg/m²  Physical Exam:   General Appearance: no distress, conversive  HEENT: PERRLA, conjuctiva normal; oropharynx clear; mucous membranes moist   Neck:  Supple, normal ROM  Lungs: CTA, normal respiratory effort, no retractions, expiratory effort normal  CV: regular rate and rhythm; no rubs/murmurs/gallops, PMI normal   ABD: soft; ND/NT; +BS  EXT: no edema  Skin: normal turgor, normal texture, no rashes  Psych: affect normal, mood normal  Neuro: AA; has expressive>receptive aphasia; +right sided weakness, +RUE drift          Invasive Devices     Peripheral Intravenous Line  Duration           Peripheral IV 05/10/23 Dorsal (posterior); Left Hand 2 days          Drain  Duration           Urethral Catheter Latex 16 Fr  3 days                 VTE Pharmacologic Prophylaxis: Heparin  Code Status: Level 1 - Full Code  Current Length of Stay: 0 day(s)    Total floor / unit time spent today 1 hour with more than 50% spent counseling/coordinating care  Counseling includes discussion with patient re: progress  and discussion with patient of his/her current medical state/information  Coordination of patient's care was performed in conjunction with primary service  Time invested included review of patient's labs, vitals, and management of their comorbidities with continued monitoring  In addition, this patient was discussed with medical team including physician and advanced extenders  The care of the patient was extensively discussed and appropriate treatment plan was formulated unique for this patient by supervising physician unless stated otherwise in their attestation statement  ** Please Note: voice to text software may have been used in the creation of this document   Audio transcription errors may occur**

## 2023-05-12 NOTE — ASSESSMENT & PLAN NOTE
· Patient with history of chronic low back pain  · Reports improvement  · X-ray revealed degenerative changes  · MRI spine revealed multilevel spondylosis  · Continue Tylenol 975 g every 8 hours  · Lidocaine patch  · Supportive cares

## 2023-05-12 NOTE — ASSESSMENT & PLAN NOTE
Presented to the ER as a stroke alert with history of known severe R M1 stenosis, prior tobacco use, recent L MCA stroke (residual aphasia) 04/16/2023 & came in as a Stroke alert on 4/26/2023  NIHSS of 2  Initial presenting deficits were L facial droop, R sided weakness, and AMS  As an inpatient patient developed even more neuro sxs including   R homonymous hemianopsia, Rightward leaning with ambulation    · IVY on 5/2 showed EF 60% with no PFO  · MRI on 5/2 showed left parietal stroke that appeared larger  · S/p cerebral angiogram with left ICA angioplasty and stent placement  · He is placed on Brilinta 4/27/2023  · Continue with aspirin and statin  · He will be transferred to acute rehab today  · Physical therapy

## 2023-05-12 NOTE — CASE MANAGEMENT
Case Management Discharge Planning Note    Patient name Olaf Barnard  Location [address] [place] [address] [id number]  : 1960 Date 2023       Current Admission Date: 2023  Current Admission Diagnosis:Stroke [facility])   Patient Active Problem List    Diagnosis Date Noted   • Abdominal aortic aneurysm (AAA) ([facility] ) 2023   • Tachycardia 2023   • Prediabetes 2023   • SIRS (systemic inflammatory response syndrome) ([facility] ) 2023   • Chronic anticoagulation 2023   • Stroke ([place] [address] ) 2023   • Hyponatremia 2023   • Middle cerebral artery stenosis, right 2023   • Left posterior MCA stroke - etiology unclear at this time 2023   • Chronic low back pain 2023   • Hypertensive encephalopathy, transient 2023   • Snoring 08/10/2020   • Memory difficulties 08/10/2020   • Chronic ischemic right MCA stroke 2019   • Status post placement of implantable loop recorder 2019   • Type 2 diabetes mellitus ([facility] ) 2019   • Adjustment reaction with anxiety 2019   • Insomnia 2019   • Fall 2019   • 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 ([facility] ) 2019   • Urinary retention 2019   • Hypertriglyceridemia 2019   • Nicotine dependence 2019   • Bilateral carotid artery stenosis 2019   • Presbyopia 2019   • History of stroke 2019   • Headache 2019   • Primary hypertension 2019   • GERD (gastroesophageal reflux disease) 2019      LOS (days): 16  Geometric Mean LOS (GMLOS) (days): 2 00  Days to GMLOS:-13 7     OBJECTIVE:  Risk of Unplanned Readmission Score: 19 35         Current admission status: Inpatient   Preferred Pharmacy:   [facility] [address] [facility] [place] [place] - [address]  [address] [place] [address]  Phone: [phone] Fax: [place] [address] [address]  Phone: [phone] Fax: [phone]    Primary Care Provider: ERIC Jones    Primary Insurance: [facility]  Secondary Insurance: [facility] Number: [id number] ([facility])

## 2023-05-12 NOTE — ASSESSMENT & PLAN NOTE
· Adequate control  · APAP TID PRN - discussed with pt   · PRN LD patch  · (Not needing Oxy PRN recently - hold on d/c)

## 2023-05-12 NOTE — ASSESSMENT & PLAN NOTE
· Adequate control  · Mgmt per IM:  metoprolol succinate 100 mg daily as well as Cozaar 50 mg daily   · Hctz stopped

## 2023-05-12 NOTE — RESTORATIVE TECHNICIAN NOTE
Restorative Technician Note      Patient Name: Carrie Liu     Note Type: Mobility  Patient Position Upon Consult: Bedside chair  Activity Performed: Ambulated; Dangled; Stood  Assistive Device: Other (Comment) (Assist x2 to the stretcher)  Education Provided: Yes  Patient Position at End of Consult: Supine    Sherice REYNA, Restorative Technician, [employer]

## 2023-05-12 NOTE — ASSESSMENT & PLAN NOTE
Mood/sleeping improving  Significant Anxiety, depression, significant insomnia with difficulty adjusting earlier in course     Neuropsych c/s 5/22  Adjustment Disorder with Anxiety and Depression  R/O Post Stroke Depression  • Supportive psychotherapy, utilizing CBT and mindfulness strategies  • DBT distress tolerance techniques  to improve coping and mood  • Meditation and relaxation training  Continue chronic home Celexa 20mg qday   Continue Trazodone 50mg HS   - Monitor for excessive serotonin - not appreciable thus far    -Monitor for signs and symptoms of excessive serotonin  -Monitor mood, efficacy, tolerance

## 2023-05-12 NOTE — H&P
PHYSICAL MEDICINE AND REHABILITATION H&P/ADMISSION NOTE  Ajith Kemp 58 y o  male MRN: [id number]  Unit/Bed#: -01 Encounter: [id number]     Rehab Diagnosis: Impairment of mobility, safety, Activities of Daily Living (ADLs), and cognitive/communication skills due to Stroke:  01 4  No paresis    Etiologic: Left Parietal Stroke, B/L Carotid Artery Stenosis  Date of Onset: 4/26/2023     Date of surgery: 5/4/23 - Right Common Carotid Arteriogram, Left Common Carotid Arteriogram, Left ICA angioplasty and stenting with distal protection device, Intraprocedural and postprocedural arteriography, Limited Right Femoral Arteriogram    History of Present Illness:   Ajith Kemp is a 58 y o  male with history of prediabetes, hypertension, depression, urinary retention on Flomax, carotid stenosis, depression, prior left MCA and right MCA stroke with aphasia and memory deficits now on Aricept who presented to the [facility] on 4/26 for AMS witnessed by his wife with abnormal speech that was noted to be similar to his prior strokes  Of note he was recently in the hospital earlier in the month for a left MCA stroke  He also had a right MCA stroke status post thrombectomy back in 2019  He is a prior smoker and quit at the time of his initial stroke  Additionally he had hyponatremia which was felt to be due to HCTZ use and potentially poor oral intake, chronic low back pain  He was seen by neurology as well as neurosurgery and eventually switched from his Eliquis/aspirin to aspirin and Brilinta  It was felt not to be cardioembolic by neurosurgery  He was also not given TNK given his recent Eliquis use when he arrived  He had imaging prior on his last admission however now with new focus of diffusion abnormality in the right frontal parietal region and in the left periatrial and parieto-occipital region  No acute hemorrhage was seen  Had a carotid Doppler showing LICA 19-44%/LLAK <90%  IVY was done and did not show any PFO or thrombus  Given that patient was on Plavix when he had his stroke on 04/16/2023, he was placed on Brilinta  There was a question of vasculitis as the etiology but Neurosurgery saw him in consult and felt that it was likely related to atherosclerotic and carotid disease and they stopped the Eliquis and placed back on ASA  He had a cerebral arteriogram on 5/4 23 with a left ICA PTA/stent placement  There was no vasculitis noted  Per Neurology full anticoagulation did not need to be restarted  The patient was evaluated by the Rehabilitation team and deemed an appropriate candidate for comprehensive inpatient rehabilitation and admitted to the [facility] on 5/12/2023  2:35 PM      Plan:     Rehabilitation  • Functional deficits: impaired mobility, self care  • Begin PT/OT/SLP  Rehabilitation goals are to achieve a modified independent to supervision level with mobility and self care  Prognosis is fair to good  ELOS is 14 days  Estimated discharge is home  DVT prophylaxis  •     Pain  • Acetaminophen 975 mg every 8 hours  • Lidocaine patch for the low back  • Aqua K-pad for low back however cannot have aqua K-pad and lidocaine patch in the same place  • Hydrocodone-acetaminophen 5-3 25 every 6 hours as needed mild pain, oxycodone 5 mg every 6 hours for moderate pain, oxycodone 10 mg every 6 hours for severe pain  • Spoke with patient and reviewed PDMP patient did see pain management Dr Kaiser Salmeron and was on only lidocaine patches at that time  Started on opioid therapy high dose in the hospital for back pain    Discussed with patient and will discontinue the Norco and have oxycodone 2 5 mg every 6 hours as needed for moderate pain or 5 mg as needed for severe pain however the goal is to wean off of these medications by the time of discharge    Bladder plan  • Cortez placed on 5/8 plan for void trial around 5/15    Bowel plan  • Continent  • Last BM 5/10    Code Status  • Level 1 full code      * Stroke [facility])  Assessment & Plan  Patient presents with left facial droop right-sided weakness and altered mental status on 4/26/2023  · Had recent stroke within the month and was on Eliquis was determined not to be candidate for TNK  · Previous loop recorder however recommended for placement of loop recorder as an outpatient  · Imaging reveals multifocal strokes and switched from aspirin 81 mg to Brilinta 90 mg twice daily  He did have a recent IVY with no PFO  · CT of the chest abdomen pelvis without evidence of malignancy done on the last admission on 4/17  · Thrombosis panel done in 04/18/2023 that was only abnl for AT3 that was low in the acute setting  Previous TTE 04/18/23 showed ED of 55% and nl wall motion and mildly dilated L atrium  · Continue Brilinta and statin and aspirin for secondary stroke prophylaxis at this time although the etiology for strokes is seems to be unclear  · Physical therapy, Occupational Therapy, speech therapy  · Disposition planning    Chronic ischemic left MCA stroke  Assessment & Plan  · Recent left MCA stroke in the beginning of April at 4/16/2023 with aphasia and was empirically treated with Eliquis 5 mg twice daily as well as aspirin 81 mg due to embolic stroke of undetermined source per prior documentation  · Despite this we presented with additional strokes for this admission    Chronic ischemic right MCA stroke  Assessment & Plan  · Patient with history of right MCA stroke back in March 2019 status post thrombectomy  · Had loop recorder placement without overt arrhythmia and was on Plavix 75 mg at that time    Also with known severe right M1 stenosis      Bilateral carotid artery stenosis  Assessment & Plan  · Status post left ICA angioplasty and stent placement neurovascular service  · Continue Brilinta, aspirin and statin  · Follow-up with him as an outpatient  · Monitor pressures and make sure he is perfusing well and not relatively hypotensive considering restarting of his antihypertensive medications      Hypokalemia  Assessment & Plan  · Most recent potassium normal however has been hyponatremic multiple times during the hospitalization  · Continue to monitor BMP and replete as needed, this is likely due to chronic use of hydrochlorothiazide    History of tobacco use  Assessment & Plan  · Patient with a history of tobacco use yet quit in 2019  · Does not need nicotine patches    Leukocytosis  Assessment & Plan  · WBC count last 13 88 on 5/6 and had been trending up slowly  · Plan to repeat labs, no clinical signs oriented occasions of infection at this time, did have chest x-ray and Pro-Sanchez done previously    Prediabetes  Assessment & Plan  · Last hemoglobin A1c of 5 8  · We will continue to hold metformin at this time monitor blood glucose levels  · Started on consistent carbohydrate 3 diet here in addition to cardiac diet on admission to [facility]    Chronic low back pain  Assessment & Plan  · Continue acetaminophen and as needed medications for low back pain  · Continue to progress and wean at this time  Had a discussion with the patient on arrival after reviewing notes from Dr Angelique Ortiz, and review of PDMP patient was never previously on opioid therapy except for short course of tramadol  Here he has been on oxycodone 10 mg dosing for severe pain as well as Norco and oxycodone 5 mg  On admission spoke with patient and will change him to a oxycodone 2 5 mg for moderate pain and 5 mg for severe pain with the plan of overall weaning till he is no longer on this medication    No clear indication for chronic opioid therapy at this time  · Continue lidocaine patch and aqua K-pad but not in the same area at the same time    Memory deficits  Assessment & Plan  · Started on Aricept due to memory difficulties after stroke in 2019  · Currently on 5 mg twice daily  · Sees Dr Garcia Fearing in outpatient neurology    Adjustment reaction with anxiety  Assessment & Plan  Continue home Celexa    Urinary retention  Assessment & Plan  Continue tamsulosin 0 4 mg with dinner    Hypertriglyceridemia  Assessment & Plan  Continue statin    GERD (gastroesophageal reflux disease)  Assessment & Plan  Continue pantoprazole and as needed Tums    Primary hypertension  Assessment & Plan  · Currently on hydrochlorothiazide 12 5 mg daily, metoprolol succinate 100 mg daily as well as Cozaar 50 mg daily  · Monitor blood pressures and therapy and adjust as needed            Subjective/Interval Events:       Review of Systems   Constitutional: Negative for chills and fever  HENT: Negative for hearing loss and trouble swallowing  Eyes: Positive for visual disturbance  Negative for photophobia  Respiratory: Negative for cough and shortness of breath  Cardiovascular: Negative for chest pain and leg swelling  Gastrointestinal: Negative for constipation, diarrhea, nausea and vomiting  Endocrine: Negative for cold intolerance and heat intolerance  Genitourinary: Negative for dysuria and hematuria  Musculoskeletal: Positive for back pain  Negative for neck pain  Skin: Negative for rash and wound  Allergic/Immunologic: Negative for environmental allergies and food allergies  Neurological: Negative for dizziness, weakness, light-headedness, numbness and headaches  Hematological: Negative for adenopathy  Does not bruise/bleed easily  Psychiatric/Behavioral: Positive for dysphoric mood  Negative for sleep disturbance  Function:  PRIOR LEVEL OF FUNCTION:  He lives in a(n) single family home  Brissa Carnes is  and lives with their spouse  Self Care: Independent, Indoor Mobility: Independent, Stairs (in/outdoor): Independent and Cognition: Independent    HOME ENVIRONMENT:  The living area: bedroom on 2nd floor and bathroom on 2nd floor  There are 2 steps to enter the home      The patient will not have 24 hour supervision/physical assistance available upon discharge as wife still works but sister can assist as needed  Current level of function:  Physical Therapy: Moderate assist x2 for all mobility and for ambulation with ataxia 20 feet +15 feet +15 feet with bilateral handhold assist  Occupational Therapy: Min assist for grooming, min to mod assist for upper body ADLs, moderate to maximum assist for lower body ADLs and toileting  Speech Therapy: Expressive language disorder with paraphasias    Physical Exam:  /55 (BP Location: Right arm)   Pulse 67   Temp 98 6 °F (37 °C) (Oral)   Resp 18   Wt 97 8 kg (215 lb 9 8 oz)   SpO2 96%   BMI 32 78 kg/m²      No intake or output data in the 24 hours ending 05/12/23 1753    Body mass index is 32 78 kg/m²  Physical Exam  Vitals reviewed  Constitutional:       General: He is not in acute distress  Appearance: He is obese  HENT:      Head: Normocephalic and atraumatic  Right Ear: External ear normal       Left Ear: External ear normal       Nose: Nose normal  No rhinorrhea  Mouth/Throat:      Mouth: Mucous membranes are moist       Pharynx: Oropharynx is clear  Eyes:      General: No scleral icterus  Cardiovascular:      Rate and Rhythm: Normal rate  Pulses: Normal pulses  Pulmonary:      Effort: Pulmonary effort is normal  No respiratory distress  Breath sounds: Normal breath sounds  Abdominal:      General: There is no distension  Palpations: Abdomen is soft  Musculoskeletal:         General: Normal range of motion  Cervical back: Normal range of motion and neck supple  Right lower leg: No edema  Left lower leg: No edema  Skin:     General: Skin is warm and dry  Neurological:      Mental Status: He is alert  Comments: Strength overall is 5/5 throughout however he has significant dysmetria and visual deficits including lateral peripheral field cuts as well as blurry vision  Finger-to-nose severely affected    Additionally has expressive aphasia Psychiatric:         Mood and Affect: Mood normal          Behavior: Behavior normal       Comments: Frustrated at times with himself            Labs, medications, and imaging personally reviewed      Laboratory:    Lab Results   Component Value Date    SODIUM 136 05/06/2023    K 3 7 05/06/2023     05/06/2023    CO2 24 05/06/2023    BUN 25 05/06/2023    CREATININE 0 77 05/06/2023    GLUC 97 05/06/2023    CALCIUM 9 0 05/06/2023     Lab Results   Component Value Date    WBC 13 88 (H) 05/06/2023    HGB 12 1 05/06/2023    HCT 35 7 (L) 05/06/2023    MCV 94 05/06/2023     05/06/2023     Lab Results   Component Value Date    INR 1 03 05/05/2023    INR 1 15 04/26/2023    INR 0 89 04/16/2023    PROTIME 13 7 05/05/2023    PROTIME 14 9 (H) 04/26/2023    PROTIME 12 7 04/16/2023         Current Facility-Administered Medications:   •  acetaminophen (TYLENOL) tablet 975 mg, 975 mg, Oral, Q8H [facility], Sharlie Apley, DO  •  [START ON 5/13/2023] aspirin (ECOTRIN LOW STRENGTH) EC tablet 81 mg, 81 mg, Oral, Daily, Sharlie Apley, DO  •  [START ON 5/13/2023] atorvastatin (LIPITOR) tablet 40 mg, 40 mg, Oral, Daily, Sharlie Apley, DO  •  bisacodyl (DULCOLAX) rectal suppository 10 mg, 10 mg, Rectal, Daily PRN, Sharlie Apley, DO  •  calcium carbonate (TUMS) chewable tablet 1,000 mg, 1,000 mg, Oral, Daily PRN, Sharlie Apley, DO  •  [START ON 5/13/2023] citalopram (CeleXA) tablet 20 mg, 20 mg, Oral, Daily, Sharlie Apley, DO  •  donepezil (ARICEPT) tablet 5 mg, 5 mg, Oral, BID, Sharlie Apley, DO, 5 mg at 05/12/23 1724  •  heparin (porcine) subcutaneous injection 5,000 Units, 5,000 Units, Subcutaneous, Q8H [facility], Sharlie Apley, DO, 5,000 Units at 05/12/23 1640  •  [START ON 5/13/2023] hydrochlorothiazide (HYDRODIURIL) tablet 12 5 mg, 12 5 mg, Oral, Daily, Sharlie Apley, DO  •  [START ON 5/13/2023] lidocaine (LIDODERM) 5 % patch 1 patch, 1 patch, Topical, Daily, Sharlie Apley, DO  •  [START ON 5/13/2023] losartan (COZAAR) tablet 50 mg, 50 mg, Oral, Daily, Sharlie Apley, DO  •  methocarbamol (ROBAXIN) tablet 500 mg, 500 mg, Oral, Q6H PRN, Sharlie Apley, DO  •  [START ON 5/13/2023] metoprolol succinate (TOPROL-XL) 24 hr tablet 100 mg, 100 mg, Oral, Daily, Sharlie Apley, DO  •  ondansetron (ZOFRAN-ODT) dispersible tablet 4 mg, 4 mg, Oral, Q6H PRN, Sharlie Apley, DO  •  oxyCODONE (ROXICODONE) IR tablet 5 mg, 5 mg, Oral, Q6H PRN, Sharlie Apley, DO  •  oxyCODONE (ROXICODONE) split tablet 2 5 mg, 2 5 mg, Oral, Q6H PRN, Sharlie Apley, DO  •  [START ON 5/13/2023] pantoprazole (PROTONIX) EC tablet 20 mg, 20 mg, Oral, Early Morning, Sharlie Apley, DO  •  [START ON 5/13/2023] polyethylene glycol (MIRALAX) packet 17 g, 17 g, Oral, QAM, Sharlie Apley, DO  •  senna (SENOKOT) tablet 17 2 mg, 2 tablet, Oral, Daily PRN, Sharlie Apley, DO  •  senna-docusate sodium (SENOKOT S) 8 6-50 mg per tablet 2 tablet, 2 tablet, Oral, BID, Sharlie Apley, DO, 2 tablet at 05/12/23 1724  •  tamsulosin (FLOMAX) capsule 0 4 mg, 0 4 mg, Oral, Daily With Inocente uGardado DO, 0 4 mg at 05/12/23 1640  •  ticagrelor (BRILINTA) tablet 90 mg, 90 mg, Oral, Q12H [facility], Sharlie Apley, DO  Allergies   Allergen Reactions   • Flexeril [Cyclobenzaprine] Drowsiness   • Lisinopril Cough   • Nsaids Confusion      Patient Active Problem List    Diagnosis Date Noted   • Stroke ([facility] ) 04/26/2023   • 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 ([facility] ) 03/28/2019   • Chronic ischemic left MCA stroke 05/12/2023   • Chronic ischemic right MCA stroke 08/06/2019   • Bilateral carotid artery stenosis 03/26/2019   • Leukocytosis 05/12/2023   • History of tobacco use 05/12/2023   • Hypokalemia 05/12/2023   • Abdominal aortic aneurysm (AAA) ([facility] ) 05/08/2023   • Tachycardia 05/05/2023   • Prediabetes 04/27/2023   • SIRS (systemic inflammatory response syndrome) (HCC) 04/27/2023   • Chronic anticoagulation 04/26/2023   • Hyponatremia 04/26/2023   • Middle cerebral artery stenosis, right 04/17/2023   • Left posterior MCA stroke - etiology unclear at this time 04/17/2023   • Chronic low back pain 04/16/2023   • Hypertensive encephalopathy, transient 01/12/2023   • Snoring 08/10/2020   • Memory deficits 08/10/2020   • Status post placement of implantable loop recorder 04/06/2019   • Type 2 diabetes mellitus ([facility] ) 04/03/2019   • Adjustment reaction with anxiety 03/29/2019   • Insomnia 03/29/2019   • Fall 03/28/2019   • Urinary retention 03/27/2019   • Hypertriglyceridemia 03/26/2019   • Nicotine dependence 03/26/2019   • Presbyopia 03/26/2019   • History of stroke 03/19/2019   • Headache 03/19/2019   • Primary hypertension 03/19/2019   • GERD (gastroesophageal reflux disease) 03/19/2019     Past Medical History:   Diagnosis Date   • Depression    • Diabetes mellitus ([facility] )    • Dyslipidemia 03/26/2019   • Hyperlipidemia    • Hypertension    • Stroke [facility])      Past Surgical History:   Procedure Laterality Date   • BACK SURGERY     • IR CEREBRAL ANGIOGRAPHY  5/4/2023   • IR STROKE ALERT  3/19/2019   • SHOULDER SURGERY       Social History     Socioeconomic History   • Marital status: /Civil Union     Spouse name: Not on file   • Number of children: Not on file   • Years of education: Not on file   • Highest education level: Not on file   Occupational History   • Not on file   Tobacco Use   • Smoking status: Former   • Smokeless tobacco: Never   Vaping Use   • Vaping Use: Some days   Substance and Sexual Activity   • Alcohol use: Not Currently   • Drug use: Never   • Sexual activity: Not on file   Other Topics Concern   • Not on file   Social History Narrative   • Not on file     Social Determinants of Health     Financial Resource Strain: Not on file   Food Insecurity: No Food Insecurity   • Worried About Running Out of Food in the Last Year: Never true   • Ran Out of Food in the Last Year: Never true   Transportation Needs: No Transportation Needs   • Lack of Transportation (Medical):  No   • Lack of Transportation (Non-Medical): No   Physical Activity: Not on file   Stress: Not on file   Social Connections: Not on file   Intimate Partner Violence: Not on file   Housing Stability: Low Risk    • Unable to Pay for Housing in the Last Year: No   • Number of Places Lived in the Last Year: 1   • Unstable Housing in the Last Year: No     Social History     Tobacco Use   Smoking Status Former   Smokeless Tobacco Never     Social History     Substance and Sexual Activity   Alcohol Use Not Currently     Family History   Problem Relation Age of Onset   • Heart attack Mother    • Diabetes Mother    • Prostate cancer Father          Medical Necessity Criteria for [facility] Admission: Hypertension, Bowel/Bladder Management, Leukocystosis and Urinary retention  In addition, the preadmission screen, post-admission physical evaluation, overall plan of care and admissions order demonstrate a reasonable expectation that the following criteria were met at the time of admission to the [place]  1  The patient requires active and ongoing therapeutic intervention of multiple therapy disciplines (physical therapy, occupational therapy, speech-language pathology, or prosthetics/orthotics), one of which is physical or occupational therapy  2  Patient requires an intensive rehabilitation therapy program, as defined in Chapter 1, section 110 2 2 of the CMS Medicare Policy Manual  This intensive rehabilitation therapy program will consist of at least 3 hours of therapy per day at least 5 days per week or at least 15 hours of intensive rehabilitation therapy within a 7 consecutive day period, beginning with the date of admission to the [place]  3  The patient is reasonably expected to actively participate in, and benefit significantly from, the intensive rehabilitation therapy program as defined in Chapter 1, section 110 2 2 of the CMS Medicare Policy Manual at this time of admission to the [place]   He can reasonably be expected to make measurable improvement (that will be of practical value to improve the patient’s functional capacity or adaptation to impairments) as a result of the rehabilitation treatment, as defined in section 110 3, and such improvement can be expected to be made within the prescribed period of time  As noted in the CMS Medicare Policy Manual, the patient need not be expected to achieve complete independence in the domain of self-care nor be expected to return to his or her prior level of functioning in order to meet this standard  4  The patient must require physician supervision by a rehabilitation physician  As such, a rehabilitation physician will conduct face-to-face visits with the patient at least 3 days per week throughout the patient’s stay in the [facility] to assess the patient both medically and functionally, as well as to modify the course of treatment as needed to maximize the patient’s capacity to benefit from the rehabilitation process  5  The patient requires an intensive and coordinated interdisciplinary approach to providing rehabilitation, as defined in Chapter 1, section 110 2 5 of the CMS Medicare Policy Manual  This will be achieved through periodic team conferences, conducted at least once in a 7-day period, and comprising of an interdisciplinary team of medical professionals consisting of: a rehabilitation physician, registered nurse,  and/or , and a licensed/certified therapist from each therapy discipline involved in treating the patient  Changes Since Pre-admission Assessment: None -This patient's participation in rehab continues to be reasonable, necessary and appropriate  CMS Required Post-Admission Physician Evaluation Elements  History and Physical, including medical history, functional history and active comorbidities as in above text  Post-Admission Physician Evaluation:  The patient has the potential to make improvement and is in need of physical, occupational, and/or therapy services   The patient may also need nutritional services  Given the patient's complex medical condition and risk of further medical complications, rehabilitative services cannot be safely provided at a lower level of care, such as a skilled nursing facility  I have reviewed the patient's functional and medical status at the time of the preadmission screening and they are the same as on the day of this admission  I acknowledge that I have personally performed a full physical examination on this patient within 24 hours of admission  The patient and/or family demonstrated understanding the rehabilitation program and the discharge process after we discussed them  Agree in entirety: yes  Minor adaptions: none    Major changes: none    Raheem De La Cruz,   Physical Medicine and [place]     I have spent a total time of 85 minutes on 05/12/23 in caring for this patient including Diagnostic results, Impressions, Counseling / Coordination of care, Documenting in the medical record, Reviewing / ordering tests, medicine, procedures  , Obtaining or reviewing history   and Communicating with other healthcare professionals

## 2023-05-12 NOTE — ASSESSMENT & PLAN NOTE
Patient with urine retention requiring Cortez catheter  Continue Flomax  Voiding trial at rehab as ambulation improves

## 2023-05-13 LAB
ALBUMIN SERPL BCP-MCNC: 3.3 G/DL (ref 3.5–5)
ALP SERPL-CCNC: 88 U/L (ref 46–116)
ALT SERPL W P-5'-P-CCNC: 86 U/L (ref 12–78)
ANION GAP SERPL CALCULATED.3IONS-SCNC: 4 MMOL/L (ref 4–13)
AST SERPL W P-5'-P-CCNC: 39 U/L (ref 5–45)
BASOPHILS # BLD AUTO: 0.13 THOUSANDS/ÂΜL (ref 0–0.1)
BASOPHILS NFR BLD AUTO: 1 % (ref 0–1)
BILIRUB SERPL-MCNC: 0.51 MG/DL (ref 0.2–1)
BUN SERPL-MCNC: 27 MG/DL (ref 5–25)
CALCIUM ALBUM COR SERPL-MCNC: 9.6 MG/DL (ref 8.3–10.1)
CALCIUM SERPL-MCNC: 9 MG/DL (ref 8.3–10.1)
CHLORIDE SERPL-SCNC: 104 MMOL/L (ref 96–108)
CO2 SERPL-SCNC: 26 MMOL/L (ref 21–32)
CREAT SERPL-MCNC: 1.22 MG/DL (ref 0.6–1.3)
EOSINOPHIL # BLD AUTO: 1.1 THOUSAND/ÂΜL (ref 0–0.61)
EOSINOPHIL NFR BLD AUTO: 9 % (ref 0–6)
ERYTHROCYTE [DISTWIDTH] IN BLOOD BY AUTOMATED COUNT: 13 % (ref 11.6–15.1)
GFR SERPL CREATININE-BSD FRML MDRD: 62 ML/MIN/1.73SQ M
GLUCOSE SERPL-MCNC: 100 MG/DL (ref 65–140)
HCT VFR BLD AUTO: 36.5 % (ref 36.5–49.3)
HGB BLD-MCNC: 12.8 G/DL (ref 12–17)
IMM GRANULOCYTES # BLD AUTO: 0.1 THOUSAND/UL (ref 0–0.2)
IMM GRANULOCYTES NFR BLD AUTO: 1 % (ref 0–2)
LYMPHOCYTES # BLD AUTO: 2.24 THOUSANDS/ÂΜL (ref 0.6–4.47)
LYMPHOCYTES NFR BLD AUTO: 17 % (ref 14–44)
MCH RBC QN AUTO: 32.5 PG (ref 26.8–34.3)
MCHC RBC AUTO-ENTMCNC: 35.1 G/DL (ref 31.4–37.4)
MCV RBC AUTO: 93 FL (ref 82–98)
MONOCYTES # BLD AUTO: 1.49 THOUSAND/ÂΜL (ref 0.17–1.22)
MONOCYTES NFR BLD AUTO: 12 % (ref 4–12)
NEUTROPHILS # BLD AUTO: 7.78 THOUSANDS/ÂΜL (ref 1.85–7.62)
NEUTS SEG NFR BLD AUTO: 60 % (ref 43–75)
NRBC BLD AUTO-RTO: 0 /100 WBCS
PLATELET # BLD AUTO: 311 THOUSANDS/UL (ref 149–390)
PMV BLD AUTO: 9.5 FL (ref 8.9–12.7)
POTASSIUM SERPL-SCNC: 3.5 MMOL/L (ref 3.5–5.3)
PROT SERPL-MCNC: 6.9 G/DL (ref 6.4–8.4)
RBC # BLD AUTO: 3.94 MILLION/UL (ref 3.88–5.62)
SODIUM SERPL-SCNC: 134 MMOL/L (ref 135–147)
WBC # BLD AUTO: 12.84 THOUSAND/UL (ref 4.31–10.16)

## 2023-05-13 PROCEDURE — 97530 THERAPEUTIC ACTIVITIES: CPT

## 2023-05-13 PROCEDURE — 97163 PT EVAL HIGH COMPLEX 45 MIN: CPT

## 2023-05-13 PROCEDURE — 92523 SPEECH SOUND LANG COMPREHEN: CPT

## 2023-05-13 PROCEDURE — 92507 TX SP LANG VOICE COMM INDIV: CPT

## 2023-05-13 PROCEDURE — 97167 OT EVAL HIGH COMPLEX 60 MIN: CPT

## 2023-05-13 PROCEDURE — 97116 GAIT TRAINING THERAPY: CPT

## 2023-05-13 PROCEDURE — 80053 COMPREHEN METABOLIC PANEL: CPT | Performed by: NURSE PRACTITIONER

## 2023-05-13 PROCEDURE — 92610 EVALUATE SWALLOWING FUNCTION: CPT

## 2023-05-13 PROCEDURE — 97535 SELF CARE MNGMENT TRAINING: CPT

## 2023-05-13 PROCEDURE — 99232 SBSQ HOSP IP/OBS MODERATE 35: CPT | Performed by: NURSE PRACTITIONER

## 2023-05-13 PROCEDURE — 99232 SBSQ HOSP IP/OBS MODERATE 35: CPT | Performed by: PHYSICAL MEDICINE & REHABILITATION

## 2023-05-13 PROCEDURE — 97542 WHEELCHAIR MNGMENT TRAINING: CPT

## 2023-05-13 PROCEDURE — 85025 COMPLETE CBC W/AUTO DIFF WBC: CPT | Performed by: NURSE PRACTITIONER

## 2023-05-13 RX ADMIN — ATORVASTATIN CALCIUM 40 MG: 40 TABLET, FILM COATED ORAL at 08:17

## 2023-05-13 RX ADMIN — DONEPEZIL HYDROCHLORIDE 5 MG: 5 TABLET ORAL at 08:17

## 2023-05-13 RX ADMIN — METOPROLOL SUCCINATE 100 MG: 100 TABLET, EXTENDED RELEASE ORAL at 08:17

## 2023-05-13 RX ADMIN — HEPARIN SODIUM 5000 UNITS: 5000 INJECTION INTRAVENOUS; SUBCUTANEOUS at 06:19

## 2023-05-13 RX ADMIN — ACETAMINOPHEN 975 MG: 325 TABLET ORAL at 06:19

## 2023-05-13 RX ADMIN — HYDROCHLOROTHIAZIDE 12.5 MG: 12.5 TABLET ORAL at 08:17

## 2023-05-13 RX ADMIN — TICAGRELOR 90 MG: 90 TABLET ORAL at 21:50

## 2023-05-13 RX ADMIN — ACETAMINOPHEN 975 MG: 325 TABLET ORAL at 21:49

## 2023-05-13 RX ADMIN — CITALOPRAM HYDROBROMIDE 20 MG: 20 TABLET ORAL at 08:18

## 2023-05-13 RX ADMIN — PANTOPRAZOLE SODIUM 20 MG: 20 TABLET, DELAYED RELEASE ORAL at 06:19

## 2023-05-13 RX ADMIN — DONEPEZIL HYDROCHLORIDE 5 MG: 5 TABLET ORAL at 17:22

## 2023-05-13 RX ADMIN — ACETAMINOPHEN 975 MG: 325 TABLET ORAL at 13:29

## 2023-05-13 RX ADMIN — HEPARIN SODIUM 5000 UNITS: 5000 INJECTION INTRAVENOUS; SUBCUTANEOUS at 13:29

## 2023-05-13 RX ADMIN — LIDOCAINE 5% 1 PATCH: 700 PATCH TOPICAL at 08:19

## 2023-05-13 RX ADMIN — LOSARTAN POTASSIUM 50 MG: 50 TABLET, FILM COATED ORAL at 08:17

## 2023-05-13 RX ADMIN — TAMSULOSIN HYDROCHLORIDE 0.4 MG: 0.4 CAPSULE ORAL at 17:22

## 2023-05-13 RX ADMIN — ASPIRIN 81 MG: 81 TABLET, COATED ORAL at 08:18

## 2023-05-13 RX ADMIN — HEPARIN SODIUM 5000 UNITS: 5000 INJECTION INTRAVENOUS; SUBCUTANEOUS at 21:50

## 2023-05-13 RX ADMIN — SENNOSIDES, DOCUSATE SODIUM 2 TABLET: 8.6; 5 TABLET ORAL at 17:22

## 2023-05-13 RX ADMIN — TICAGRELOR 90 MG: 90 TABLET ORAL at 08:19

## 2023-05-13 RX ADMIN — SENNOSIDES, DOCUSATE SODIUM 2 TABLET: 8.6; 5 TABLET ORAL at 08:18

## 2023-05-13 NOTE — PROGRESS NOTES
Physical Medicine and Rehabilitation Progress Note  Anali Rey 58 y o  male MRN: [id number]  Unit/Bed#: -01 Encounter: [id number]    Chief Complaint:  f/u stroke    Interval History: No acute events overnight  Sleep has been fine  No headaches, lightheadedness, dizziness, CP, SOB, fevers, chills, N/V, abdominal pain  Last BM was 5/13  Incontinent  Has a jo in place  He tolerated therapies - found it challenging  He has no other new concerns at this time  Assessment/Plan - Continue plan of care as per primary attending, unless otherwise stated below:      · Multifocal CVA/recent L MCA/previous R MCA CVA:  · IVY negative for thrombus/PFO  · CT CAP without evidence of malignancy  · Plan is for previous loop recorder from 2019 to be removed and replaced as an outpatient  · [place] not to be vasculitis  A-gram negative  · Thrombosis panel only abnormal for AT3 in acute setting - would repeat in 6-8 weeks  · Previous TTE with 55% EF and mildly dilated L atrium with no RWMA  · On ASA/Brillinta/Statin  · PT/OT/SLP  · Bilateral Carotid Artery Stenosis  · S/p L ICA angioplasty and stent  · Continue ASA/Brillinta/Statin  · Outpatient f/u with Neurovascular  · Hypokalemia  · Monitor and supplement as appropriate  · Tobacco Use Disorder - in remission quit 2019  · Leukocytosis: Improving today  No localizing signs of infection  Trend/monitor off abx for now  · Prediabetes  · Counseling  · Chronic Low back pain  · Mild, no acute complaints  Continue medication adjustments as per primary attending  · Memory impairment  · Continue Aricept  · Outpatient f/u with Neuro  · Adjustment reaction with anxiety  · Would benefit from rehab psychology when that is available again  · Non-pharm strategies  · Continue home celexa, adjust dosing as appropriate  · Urinary retention  · Jo in place with flomax     · Void trial as per primary team  · Hypertriglyceridemia  · Continue Statin  · GERD  · Continue PPI and TUMs  · HTN  · Continue management as per IM  · AAA  · Found incidentally  · 3 4cm  · Outpatient surveillance and f/u  · Hyponatremia  · Mild and 134 today  Recheck on 5/15  · Asymptomatic  · DVT PPx: HSQ, SCDs      Total visit time: 35 minutes, with more than 50% spent counseling/coordinating care  Counseling includes discussion with patient re: progress in therapies, functional issues observed by therapy staff, and discussion with patient regarding their current medical state and wellbeing  Coordination of patient's care was performed in conjunction with Internal Medicine service to monitor patient's labs, vitals, and management of their comorbidities  Raymond Quintanilla MD  Physical Medicine and Rehabilitation      Review of Systems: A 10 point review of systems was negative except for what is noted in the HPI          Current Facility-Administered Medications:   •  acetaminophen (TYLENOL) tablet 975 mg, 975 mg, Oral, Q8H [address], Juan Italian, DO, 975 mg at 05/13/23 7105  •  aspirin (ECOTRIN LOW STRENGTH) EC tablet 81 mg, 81 mg, Oral, Daily, Juan Italian, DO, 81 mg at 05/13/23 0818  •  atorvastatin (LIPITOR) tablet 40 mg, 40 mg, Oral, Daily, Juan Italian, DO, 40 mg at 05/13/23 8220  •  bisacodyl (DULCOLAX) rectal suppository 10 mg, 10 mg, Rectal, Daily PRN, Juan Italian, DO  •  calcium carbonate (TUMS) chewable tablet 1,000 mg, 1,000 mg, Oral, Daily PRN, Juan Italian, DO  •  citalopram (CeleXA) tablet 20 mg, 20 mg, Oral, Daily, Juan Italian, DO, 20 mg at 05/13/23 0818  •  donepezil (ARICEPT) tablet 5 mg, 5 mg, Oral, BID, Juan Italian, DO, 5 mg at 05/13/23 4980  •  heparin (porcine) subcutaneous injection 5,000 Units, 5,000 Units, Subcutaneous, Q8H [address], Juan Italian, DO, 5,000 Units at 05/13/23 9033  •  hydrochlorothiazide (HYDRODIURIL) tablet 12 5 mg, 12 5 mg, Oral, Daily, Juan Italian, DO, 12 5 mg at 05/13/23 6026  •  lidocaine (LIDODERM) 5 % patch 1 patch, 1 patch, Topical, Daily, Juan Italian, DO, 1 patch at 05/13/23 3599  •  losartan (COZAAR) tablet 50 mg, 50 mg, Oral, Daily, Haze Arvind, DO, 50 mg at 05/13/23 6550  •  methocarbamol (ROBAXIN) tablet 500 mg, 500 mg, Oral, Q6H PRN, Haze Arvind, DO  •  metoprolol succinate (TOPROL-XL) 24 hr tablet 100 mg, 100 mg, Oral, Daily, Haze Arvind, DO, 100 mg at 05/13/23 0472  •  ondansetron (ZOFRAN-ODT) dispersible tablet 4 mg, 4 mg, Oral, Q6H PRN, Haze Arvind, DO  •  oxyCODONE (ROXICODONE) IR tablet 5 mg, 5 mg, Oral, Q6H PRN, Haze Arvind, DO  •  oxyCODONE (ROXICODONE) split tablet 2 5 mg, 2 5 mg, Oral, Q6H PRN, Haze Arvind, DO  •  pantoprazole (PROTONIX) EC tablet 20 mg, 20 mg, Oral, Early Morning, Haze Arvind, DO, 20 mg at 05/13/23 5356  •  polyethylene glycol (MIRALAX) packet 17 g, 17 g, Oral, QAM, Haze Arvind, DO  •  senna (SENOKOT) tablet 17 2 mg, 2 tablet, Oral, Daily PRN, Haze Arvind, DO  •  senna-docusate sodium (SENOKOT S) 8 6-50 mg per tablet 2 tablet, 2 tablet, Oral, BID, Haze Arvind, DO, 2 tablet at 05/13/23 0818  •  tamsulosin (FLOMAX) capsule 0 4 mg, 0 4 mg, Oral, Daily With Dinner, Haze Arvind, DO, 0 4 mg at 05/12/23 1640  •  ticagrelor (BRILINTA) tablet 90 mg, 90 mg, Oral, Q12H [address], Haze Arvind, DO, 90 mg at 05/13/23 0819    Physical Exam:  Temp:  [98 1 °F (36 7 °C)-98 6 °F (37 °C)] 98 3 °F (36 8 °C)  HR:  [67-80] 70  Resp:  [17-18] 17  BP: (116-133)/(55-75) 133/75  SpO2:  [94 %-97 %] 95 %    Gen: No acute distress, Well-nourished, well-appearing  HEENT: Moist mucus membranes, Normocephalic/Atraumatic  Cardiovascular: Regular rate, rhythm, S1/S2  Distal pulses palpable  Heme/Extr: No edema  Pulmonary: Non-labored breathing  Lungs CTAB  : No jo  GI: Soft, non-tender, non-distended  BS+  Integumentary: Skin is warm, dry  Neuro: Awake, alert  Answering questions appropriately  Psych: Normal mood and affect       Laboratory:  Labs reviewed  Results from last 7 days   Lab Units 05/13/23  0559   HEMOGLOBIN g/dL 12 8   HEMATOCRIT % 36 5   WBC Thousand/uL 12 84*     Results from last 7 days   Lab Units 05/13/23  0559   BUN mg/dL 27*   SODIUM mmol/L 134*   POTASSIUM mmol/L 3 5   CHLORIDE mmol/L 104   CREATININE mg/dL 1 22   AST U/L 39   ALT U/L 86*            Diagnostic Studies: Reviewed, no new imaging   No orders to display       ** Please Note: Fluency Direct voice to text software may have been used in the creation of this document   **

## 2023-05-13 NOTE — PROGRESS NOTES
Internal Medicine Progress Note  Patient: Shelbi Mcdonnell  Age/sex: 58 y o  male  Medical Record #: [id number]      ASSESSMENT/PLAN: (Interval History)  Shelbi Mcdonnell is seen and examined and management for following issues:    Acute multifocal ischemic strokes  • Occurred in different arterial distributions  • IVY was negative for thrombus/PFO  • Felt not to be vasculitis and negative by a-gram 5/4/23  • Continue ASA/Brilinta  • Continue statin     Left ICA stenosis  • Was noted to have all of the CVAs in last month have been in left anterior circulation  • S/p PTA/stent 5/4/23  • Recheck carotid doppler 6 weeks and see NS  • Continue AP/statin     Right ICA stenosis  • To followup with Vasc surgery as OP  • Continue AP/statin     LOOP recorder  • Was placed 3/2019  • Neuro wants it replaced = for OP to Cardiology     Leukocytosis  • Had no fevers  • CXR and Procal were normal  • Last CBC was done 5/6/23  • Stable  HTN  • Home:  Toprol 100mg qd/Norvasc 10mg qd/Losartan 50mg qd/HCTZ 25mg qd/Hydralazine 25mg TID  • Here:  Toprol 100mg qd/Losartan 50mg qd/HCTZ 12 5mg qd  • HCTZ was decreased to 12 5mg qd today before transfer here to [facility]     Pre DM  • HbA1C 5 8  • Takes Metformin at home but currently not on it in hospital  • Wife didn't want him to have Accuchecks noting that he was pre-DM so they were stopped in hospital   The BSs had been very normal in acute anyway  • Will watch fastings with BMPs  • For DM diet which he had not been on in acute  • Dr Zenobia Gong spoke with him about that and he was ok with that plan     Depression  • Continue celexa as at home     Memory loss  • Continue Aricept as at home  • He has had memory issues since his CVA in 2019     Urine retention  • Has jo  • VT per PMR  • Continue Flomax     Chronic LBP  • MRI showed advanced multilevel spondylosis, slightly progressed on the right at L2-3 compared to the prior study but otherwise stable     • Pain management per PMR AAA  • Found on L-spine MRI  • Measures 3 4 cm  • OP followup    Hyponatremia  · Na 134  · Will monitor  The above assessment and plan was reviewed and updated as determined by my evaluation of the patient on 5/13/2023      Labs:   Results from last 7 days   Lab Units 05/13/23  0559   WBC Thousand/uL 12 84*   HEMOGLOBIN g/dL 12 8   HEMATOCRIT % 36 5   PLATELETS Thousands/uL 311     Results from last 7 days   Lab Units 05/13/23  0559   SODIUM mmol/L 134*   POTASSIUM mmol/L 3 5   CHLORIDE mmol/L 104   CO2 mmol/L 26   BUN mg/dL 27*   CREATININE mg/dL 1 22   CALCIUM mg/dL 9 0             Results from last 7 days   Lab Units 05/08/23  1642 05/07/23  1521 05/07/23  1053   POC GLUCOSE mg/dl 112 198* 142*       Review of Scheduled Meds:  Current Facility-Administered Medications   Medication Dose Route Frequency Provider Last Rate   • acetaminophen  975 mg Oral Q8H [address] Ne Duarte, DO     • aspirin  81 mg Oral Daily Ne Duarte, DO     • atorvastatin  40 mg Oral Daily Ne Duarte, DO     • bisacodyl  10 mg Rectal Daily PRN Ne Duarte, DO     • calcium carbonate  1,000 mg Oral Daily PRN Ne Duarte, DO     • citalopram  20 mg Oral Daily Ne Duarte, DO     • donepezil  5 mg Oral BID Ne Duarte, DO     • heparin (porcine)  5,000 Units Subcutaneous Q8H [address] Ne Duarte, DO     • hydrochlorothiazide  12 5 mg Oral Daily Ne Duarte, DO     • lidocaine  1 patch Topical Daily Ne Duarte, DO     • losartan  50 mg Oral Daily Ne Duarte, DO     • methocarbamol  500 mg Oral Q6H PRN Ne Duarte, DO     • metoprolol succinate  100 mg Oral Daily Ne Duarte, DO     • ondansetron  4 mg Oral Q6H PRN Ne Duarte, DO     • oxyCODONE  5 mg Oral Q6H PRN Ne Duarte, DO     • oxyCODONE  2 5 mg Oral Q6H PRN Ne Duarte, DO     • pantoprazole  20 mg Oral Early Morning Ne Duarte, DO     • polyethylene glycol  17 g Oral QAM Ne Duarte, DO     • senna  2 tablet Oral Daily PRN Ne Duarte, DO     • senna-docusate sodium  2 tablet Oral BID Tere Rob DO     • tamsulosin  0 4 mg Oral Daily With Dinner Tere Rob DO     • ticagrelor  90 mg Oral Q12H [address] Tere Rob DO         Subjective/ HPI: Patient seen and examined  Patients overnight issues or events were reviewed with nursing or staff during rounds or morning huddle session  New or overnight issues include the following:     Pt seen in his room  He denies any current complaints  ROS:   A 10 point ROS was performed; negative except as noted above  Imaging:     No orders to display       *Labs /Radiology studies Reviewed  *Medications  reviewed and reconciled as needed  *Please refer to order section for additional ordered labs studies  *Case discussed with primary attending during morning huddle case rounds    Physical Examination:  Vitals:   Vitals:    05/12/23 2100 05/13/23 0615 05/13/23 0700 05/13/23 0817   BP: 116/56 119/55 122/55 133/75   BP Location: Right arm  Left arm Left arm   Pulse: 80 73 80 70   Resp: 17 17     Temp: 98 1 °F (36 7 °C) 98 3 °F (36 8 °C)     TempSrc: Oral Oral     SpO2: 97% 94% 95%    Weight:         General Appearance: no distress, conversive  HEENT: PERRLA, conjuctiva normal; oropharynx clear; mucous membranes moist;   Neck:  Supple, no lymphadenopathy or thyromegaly  Lungs: CTA, normal respiratory effort, no retractions, expiratory effort normal  CV: regular rate and rhythm , PMI normal   ABD: soft non tender, no masses , no hepatic or splenomegaly  EXT: DP pulses intact, no lymphadenopathy, no edema  Skin: normal turgor, normal texture, no rash  Psych: affect normal, mood normal  Neuro: Expressive aphasia  Rt sided weakness    The above physical exam was reviewed and updated as determined by my evaluation of the patient on 5/13/2023  Invasive Devices     Peripheral Intravenous Line  Duration           Peripheral IV 05/10/23 Dorsal (posterior); Left Hand 3 days          Drain  Duration           Urethral Catheter Latex 16 Fr  4 days                   VTE Pharmacologic Prophylaxis: Heparin  Code Status: Level 1 - Full Code  Current Length of Stay: 1 day(s)      Total time spent:  30 minutes with more than 50% spent counseling/coordinating care  Counseling includes discussion with patient re: progress  and discussion with patient of his/her current medical state/information  Coordination of patient's care was performed in conjunction with primary service  Time invested included review of patient's labs, vitals, and management of their comorbidities with continued monitoring  In addition, this patient was discussed with medical team including physician and advanced extenders  The care of the patient was extensively discussed and appropriate treatment plan was formulated unique for this patient  Medical decision making for the day was made by supervising physician unless otherwise noted in their attestation statement  ** Please Note:  voice to text software may have been used in the creation of this document   Although proof errors in transcription or interpretation are a potential of such software**

## 2023-05-13 NOTE — PROGRESS NOTES
Occupational Therapy Evaluation           05/13/23 0700   Patient Data   Rehab Impairment Impairment of mobility, safety, Activities of Daily Living (ADLs), and cognitive/communication skills due to Stroke:  01 4  No paresis   Etiologic Diagnosis Left Parietal Stroke, B/L Carotid Artery Stenosis   Date of Onset 04/26/23   Support System   Name pt lives at home with his wife  She works in an office  Pt reports have 2 children, 1 local  Pt has local sister who he reports has assisted in the past, she is semi-retired  Able to provide 24 hour supervision   (need to confirm with family)   Home Setup   Type of Home Multi Level   Number of Stairs 2   First Floor Bathroom   (need to confirm with family  pt reports has access to shower on 1st floor but there are memory deficits)   First Floor Setup Available Yes   Available Equipment   (pt unable to recall DME at home)   [address]   (retired)   Transportation Family/friends drive   Prior IADL Participation   Money Management   (wife assists)   Meal Preparation Partial Participation   Laundry   (wife assists)   Home Cleaning Partial Participation   Prior Level of Function   Self-Care 3  Independent - Patient completed the activities by him/herself, with or without an assistive device, with no assistance from a helper  Indoor-Mobility (Ambulation) 3  Independent - Patient completed the activities by him/herself, with or without an assistive device, with no assistance from a helper  Functional Cognition 2  Needed Some Help - Patient needed a partial assistance from another person to complete activities  Prior Assistance Needed for Driving;Household Chores/Cleaning;Medication Management;Money Management; Shopping;Meal Preparation   Prior Device Used Z   None of the above  (per pt none)   Falls in the Last Year   Number of falls in the past 12 months 0   Psychosocial   Psychosocial (WDL) X   Patient Behaviors/Mood Appropriate for situation;Flat affect Restrictions/Precautions   Precautions Aphasia;Bed/chair alarms;Aspiration;Cognitive; Fall Risk;Supervision on toilet/commode;Visual deficit   Pain Assessment   Pain Assessment Tool 0-10   Pain Score No Pain   Eating Assessment   Type of Assistance Needed Supervision   Physical Assistance Level No physical assistance   Comment recommend supervision 2* cognitive and physical deficits   Eating CARE Score 4   Oral Hygiene   Type of Assistance Needed Physical assistance   Physical Assistance Level Total assistance   Comment would be assist x2 in stance   seated upright in bed close supervision   Oral Hygiene CARE Score 1   Shower/Bathe Self   Type of Assistance Needed Physical assistance   Physical Assistance Level Total assistance   Comment assist x2 bed level ADL   Shower/Bathe Self CARE Score 1   Dressing/Undressing Clothing   Type of Assistance Needed Physical assistance   Physical Assistance Level Total assistance   Comment assist x2 at EOB   Upper Body Dressing CARE Score 1   Type of Assistance Needed Physical assistance   Physical Assistance Level Total assistance   Comment total assist bed level   Lower Body Dressing CARE Score 1   Positioning In Bed;Sit Edge Of Bed   Putting On/Taking Off Footwear   Type of Assistance Needed Physical assistance   Physical Assistance Level Total assistance   Putting On/Taking Off Footwear CARE Score 1   [address]   Type of Assistance Needed Physical assistance   Physical Assistance Level Total assistance   Comment recommend bed level only   Yoan Martinez 83 Score 1   Toilet Transfer   Reason if not Attempted Safety concerns   Toilet Transfer CARE Score 88   Transfer Bed/Chair/Wheelchair   Type of Assistance Needed Physical assistance   Physical Assistance Level Total assistance   Comment attempted sliding board transfer to drop arm recliner with total assist x2  pt with impaired command following, sitting balance, L UE pushing, impaired coordination and language deficits  returned back to bed and vitals taken and WNL  Chair/Bed-to-Chair Transfer CARE Score 1   Roll Left and Right   Type of Assistance Needed Physical assistance   Physical Assistance Level 51%-75%   Roll Left and Right CARE Score 2   Sit to Lying   Type of Assistance Needed Physical assistance   Physical Assistance Level Total assistance   Comment assist x2   Sit to Lying CARE Score 1   Lying to Sitting on Side of Bed   Type of Assistance Needed Physical assistance   Physical Assistance Level 76% or more   Lying to Sitting on Side of Bed CARE Score 2   Comprehension   QI: Comprehension 2  Sometimes Understands: Understands only basic conversations or simple, direct phrases  Frequently requires cues to understand   Expression   QI: Expression 2  Frequently exhibits difficulty with expressing needs and ideas   RUE Assessment   RUE Assessment   (need to continue to assess)   Coordination   Movements are Fluid and Coordinated 0   Coordination and Movement Description impaired R UE coordination and sensation   Cognition   Overall Cognitive Status Impaired   Arousal/Participation Alert   Attention Difficulty attending to directions   Orientation Level Disoriented to person;Disoriented to situation;Disoriented to time   Memory Decreased recall of precautions;Decreased recall of recent events;Decreased short term memory;Decreased recall of biographical information;Decreased long term memory   Following Commands Follows one step commands inconsistently   Comments pt recalls his name but not his age or   Pt states he is at [address] but unable to state why   Pt with impaired ability to use call button and will need repetitive training   Vision   Vision Comments anticipate R visual deficits, will need to continue to assess   Perception   Inattention/Neglect Cues to attend right visual field;Cues to attend to right side of body;Cues to maintain midline in sitting   Discharge Information   Vocational Plan Retired/not working   Patient's Discharge Plan pt's goal is to return home   Patient's Rehab Expectations to get his memory better   Barriers to Discharge Home Limited Family Support; Unsafe Home Setup; Decreased Cognitive Function;Decreased Strength;Decreased Endurance; Safety Considerations   Impressions Pt is a 58 y o  male who was admitted to [address] [place] [address] on 5/12/2023  Pt's current problem list includes: Left Parietal Stroke, B/L Carotid Artery Stenosis s/p arteriograms 5/4  Pt PMH includes - R MCA CVA 2019; L MCA CVA April 2023 and now this stroke  Pt is currently a poor historian, will need to confirm home setup, PLOF and assistance available with family  However pt reporting he has 2 CHRIS and 1st floor setup  At baseline pt was completing ADLS and mobility independnetly (per pt) and lives with his wife who works full time  Pt reports having a local sister who is semi retired and may be able to assist and 1/2 children is local  Currently pt requires total assist for overall ADLS and total assist x2 for sliding board transfer  Transfers limited 2* impaired cognition, command following, expressive language, and physical deficits, OT recommending bed level only at this time outside of therapy  Pt currently presents with impairments in the following categories - activity tolerance, endurance, standing balance/tolerance, sitting balance/tolerance, memory, insight, safety , judgement , attention , sequencing , sensation , visual perceptual skills , R/L discrimination , (R) attention, coping skills  and communication   These impairments, as well as pt's fatigue, (R) UE dysmetria, (R) hemiparesis, (R) visual deficits , decreased caregiver support, risk for falls and home environment  limit pt's ability to safely engage in all baseline areas of occupation, including eating, grooming, bathing, dressing, toileting, functional mobility/transfers, community mobility, house maintenance, meal prep, cleaning, social participation  and leisure activities    Pt presents with fair rehab potential  Pt is unsafe to D/C home at this time, recommending ELOS 3-4 weeks to achieve min assist level goals, potential need for wheelchair  Of note per chart review, Neurology has administered MOCA in past and pt scored 18/30 on 3/21/21 and 25/30 on 1/6/22   Vitals taken during session, 02 room air 93-95%, HR 80, /55 L UE     OT Therapy Minutes   OT Time In 0700   OT Time Out 0800   OT Total Time (minutes) 60   OT Mode of treatment - Individual (minutes) 60   OT Mode of treatment - Concurrent (minutes) 0   OT Mode of treatment - Group (minutes) 0   OT Mode of treatment - Co-treat (minutes) 0   OT Mode of Treatment - Total time(minutes) 60 minutes   OT Cumulative Minutes 60   Cumulative Minutes   Cumulative therapy minutes 60

## 2023-05-13 NOTE — PROGRESS NOTES
SLP Bedside Swallow and Speech/Language Assessment       05/13/23 0800   Pain Assessment   Pain Assessment Tool 0-10   Pain Score No Pain   Restrictions/Precautions   Precautions Aphasia;Bed/chair alarms;Cognitive; Fall Risk; [place]; Visual deficit;Supervision on toilet/commode   Weight Bearing Restrictions No   Comprehension   Assist Devices   (reports having reading glasses)   Auditory Basic   Visual Basic   Findings Pt completed Bedside WAB assessment- see SLP rehab note for details  QI: Comprehension 2  Sometimes Understands: Understands only basic conversations or simple, direct phrases  Frequently requires cues to understand   Comprehension (FIM) 3 - Understands basic info/conversation 50-74% of time   Expression   Verbal Basic   Non-Verbal Basic   Intelligibility Phrase   Findings Pt completed Bedside WAB assessment- see SLP rehab note for details  QI: Expression 2  Frequently exhibits difficulty with expressing needs and ideas   Expression (FIM) 2 - Uses only simple expressions or gestures (waves, hello)   Social Interaction   Cooperation with staff   Participation Individual   Behaviors observed   (frustrated)   Findings Pt completed Bedside WAB assessment- see SLP rehab note for details  Social Interaction (FIM) 4 - Interacts 75-89% of time   Problem Solving   Findings Pt completed Bedside WAB assessment- see SLP rehab note for details  Problem solving (FIM) 2 - Solves basic problems 25-49% of time   Memory   Recognize People Yes   Remember Routine No   Initiates Tasks No   Short-Term Impaired   Long Term Impaired   Recalls Precaution No   Findings Pt completed Bedside WAB assessment- see SLP rehab note for details     Memory (FIM) 2 - Recognizes, recalls/performs 25-49%   Language Assessments   Western Aphasia Battery (WAB) Bedside Western Aphasia Battery        Subtest  Score   Spontaneous Speech: Content 5   Spontaneous Speech: Fluency 4   Auditory Verbal Comprehension: Yes/No Questions 9 "  Sequential Commands 0   Repetition 5   Object Naming 2   Bedside Aphasia Score 41 6  severely impaired    Bedside Aphasia Classification Broca's       Pt completing the Bedside WAB in which overall bedside aphasia score deems pt to demonstrate severely impaired language deficits  Additionally, pt also demonstrates Broca's type aphasia as per Bedside Aphasia Classification Criteria, when comparing the pt's Fluency  Auditory Verbal Comprehension, Repetition scores  Pt will benefit from SLP services at this time to maximize receptive and expressive language skills while in the acute rehab center  Speech/Language/Cognition Assessmetn   Treatment Assessment Pt seen for skilled speech/language assessment  Noted pt fatigued from complete OT ADL eval prior to session, pt also frustrated with speech and overall situation therefore emotions impacting overall performance as well  Pt agreeable to assessment, able to recite his name after correcting himself as his wife's name \"Faiza\" came out first  Pt only able to stated \"[place]\" for current location, needing cues for [facility]  Pt did clearly state \"I had a stroke\" as reason for being here  However unable to recite any portions of today's date without max cuing  In regards to LTM, pt unable to recite  stating \"I cant remember when it is\"  Pt able to select age from [facility] options  Pt recalls his wife's name and that he has 2 children however unable to recall exactly if he has 1 son and 1 daughter or either of their names  Pt able to complete his address with phrase completions cues only  Pt reports he was  prior, pt also reports his wife takes care of the bills, he does his own meds and laundry and his wife does all other home tasks- cooking, cleaning, appointments  Throughout assessment, pt expressing \"Frustrated\" multiple times as well as encouraging himself with \"come on Zhu\" when he was stuck on a word/answer   Pt did communicate clearly his goal to " "\"Get out and back to myself\"  At this time, pt will cont to benefit from skilled SLP services targeting expressive and receptive communication skills for increased independence with communication for decreased burden of care upon safe discharge home  Eating   Type of Assistance Needed Supervision;Set-up / Talya Said; Verbal cues; Incidental touching  (for self feeding assistance)   Physical Assistance Level 25% or less   Eating CARE Score 3   Swallow Assessment   Swallow Treatment Assessment Bedside Swallow Evaluation      Patient Name: Matias Marsh    [id number] Date: 5/13/2023     Problem List  Principal Problem:    Stroke [facility])  Active Problems:    Primary hypertension    GERD (gastroesophageal reflux disease)    Hypertriglyceridemia    Bilateral carotid artery stenosis    Urinary retention    Adjustment reaction with anxiety    Chronic ischemic right MCA stroke    Memory deficits    Chronic low back pain    Prediabetes    Leukocytosis    History of tobacco use    Chronic ischemic left MCA stroke    Hypokalemia      Past Medical History  Past Medical History:   Diagnosis Date    Depression     Diabetes mellitus ([facility] [address] )     Dyslipidemia 03/26/2019    Hyperlipidemia     Hypertension     Stroke [facility])        Past Surgical History  Past Surgical History:   Procedure Laterality Date    BACK SURGERY      IR CEREBRAL ANGIOGRAPHY  5/4/2023    IR STROKE ALERT  3/19/2019    SHOULDER SURGERY             Reason for consult: OT alerted SLP regarding possibly need for swallow eval given severity of ataxia, poor positioning, and aphasia   SLP able to pull bfast tray to complete eval prior to planned speech/language eval     Swallow Information   Current Risks for Dysphagia & Aspiration: General debilitation;New Neuro event;Brain injury;Cognitive deficit;HX neurologic dx;Positionong Issues;  Current Symptoms/Concerns: decreased oral intake, poor posture and self feeding due to ataxia  Current Diet: regular diet and thin liquids " "  Baseline Diet: regular diet and thin liquids      Baseline Assessment   Behavior/Cognition: alert  Speech/Language Status: able to participate in basic conversation and able to follow commands inconsistently  Patient Positioning: upright in bed in chair mode however pt moving around and difficult to maintain position  Pain Status/Interventions/Response to Interventions: No report of pain  Swallow Mechanism Exam  Facial: symmetrical  Labial: WFL  Lingual: WFL  Velum: unable to visualize  Mandible: adequate ROM  Dentition: adequate  Vocal quality:clear/adequate   Volitional Cough: strong/productive   Respiratory Status: on RA         Consistencies Assessed:  Consistencies Administered: thin liquids, soft solids and hard solids  Materials administered included eggs, fresh fruit, blueberry muffin and thin liquids by straw- observed nursing provide meds whole with water by straw    Total Amount Consumed:   50% meal  120cc liquids    Oral stage:WFL  Lip closure: functional  Anterior spillage: none  Mastication: adequate  Bolus formation: functional  Bolus control: suspect functional  Transfer: timely  Oral residue: none  Pocketing: none     Pharyngeal stage:WFL  Swallow promptness: timely  Hyolaryngeal elevation: present  Wet voice: none  Throat clear: none  Cough: none  Secondary swallows: none  Audible swallows: none     Esophageal stage:WFL    Summary:      Pt presenting with WFL oral and WFL pharyngeal dysphagia today  Pt assessed with meds first- nursing provided whole with puree initially however pt chewing meds and needed max cues to swallow whole  Trialed whole with water one at a time and tolerated well- cont to recommend this way  Pt set up with breakfast tray, voicing \"not hungry\" and when asked what he eats at home for breakfast, pt stated \"nothing\"  Pt did eat a few bites of all items on tray- noted needed assistance with self feeding given ataxia and difficulty using utensils appropriately   Pt with " functional hold though with no loss  Mastication and breakdown adequate with timely bolus formations, transfers and swallows  Thin liquids observed by straw- no s/s aspiration with single and successive sips  Recommendations:  Diet: regular diet and thin liquids  Meds: whole with liquid  Strategies: upright posture, only feed when fully alert, quiet environment (tv off, limit talking, door closed, etc ) and alternating bites and sips    FULL supervision w/ meals due to self feeding assistance      Results reviewed with:  patient, RN, PT and OT   Aspiration precautions posted  F/u ST tx: Pt will continue to benefit from ongoing skilled dysphagia tx sessions to establish safest least restrictive diet w/o increased oropharyngeal or aspiration sxs as well as monitor ability to carryover swallow strategies independently  Plan: No further dysphagia tx services warranted- may reconsult as needed   Swallow Assessment Prognosis   Prognosis Good   Prognosis Considerations Age; Co-morbidities; Medical diagnosis; Medical prognosis; Cooperation   SLP Therapy Minutes   SLP Time In 0800   SLP Time Out 0930   SLP Total Time (minutes) 90   SLP Mode of treatment - Individual (minutes) 90   SLP Mode of treatment - Concurrent (minutes) 0   SLP Mode of treatment - Group (minutes) 0   SLP Mode of treatment - Co-treat (minutes) 0   SLP Mode of Treatment - Total time(minutes) 90 minutes   SLP Cumulative Minutes 90

## 2023-05-13 NOTE — PLAN OF CARE
Problem: PAIN - ADULT  Goal: Verbalizes/displays adequate comfort level or baseline comfort level  Description: Interventions:  - Encourage patient to monitor pain and request assistance  - Assess pain using appropriate pain scale  - Administer analgesics based on type and severity of pain and evaluate response  - Implement non-pharmacological measures as appropriate and evaluate response  - Consider cultural and social influences on pain and pain management  - Notify physician/advanced practitioner if interventions unsuccessful or patient reports new pain  Outcome: Progressing     Problem: INFECTION - ADULT  Goal: Absence or prevention of progression during hospitalization  Description: INTERVENTIONS:  - Assess and monitor for signs and symptoms of infection  - Monitor lab/diagnostic results  - Monitor all insertion sites, i e  indwelling lines, tubes, and drains  - Monitor endotracheal if appropriate and nasal secretions for changes in amount and color  - [place] appropriate cooling/warming therapies per order  - Administer medications as ordered  - Instruct and encourage patient and family to use good hand hygiene technique  - Identify and instruct in appropriate isolation precautions for identified infection/condition  Outcome: Progressing  Goal: Absence of fever/infection during neutropenic period  Description: INTERVENTIONS:  - Monitor WBC    Outcome: Progressing     Problem: DISCHARGE PLANNING  Goal: Discharge to home or other facility with appropriate resources  Description: INTERVENTIONS:  - Identify barriers to discharge w/patient and caregiver  - Arrange for needed discharge resources and transportation as appropriate  - Identify discharge learning needs (meds, wound care, etc )  - Arrange for interpretive services to assist at discharge as needed  - Refer to Case Management Department for coordinating discharge planning if the patient needs post-hospital services based on physician/advanced practitioner order or complex needs related to functional status, cognitive ability, or social support system  Outcome: Progressing

## 2023-05-13 NOTE — PROGRESS NOTES
OT LONG TERM GOALS          05/13/23 0700   Rehab Team Goals   ADL Team Goal Patient will require assist with ADLs with least restrictive device upon completion of rehab program   Rehab Team Interventions   OT Interventions Self Care;Home Management; Therapeutic Exercise;Cognitive Reintegration;Cognitive Retraining;Energy Conservation;Patient/Family Education   Eating Goal   Eating Goal 05  Setup or clean-up assistance - [place] SETS UP or CLEANS UP, patient completes activity  [place] assists only prior to or following the activity  Status Ongoing; Target goal - three weeks; Target goal - four weeks   Interventions Compensation Strategies; Neuromuscular Education; Optimal Position   Grooming Goal   Oral Hygiene Goal 05  Setup or clean-up assistance - [place] SETS UP or CLEANS UP, patient completes activity  [place] assists only prior to or following the activity  Task Wash/Dry Face;Wash/Dry Hands;Brush Teeth   Environment Seated at Sink;Seated in Chair   Status Ongoing; Target goal - three weeks; Target goal - four weeks   Intervention Balance Work;Neuromuscular Education; Therapeutic Exercise; Tolerance Work   Tub/Shower Transfer Goal   Method   (mod assist)   Assist Device Seat with [employer] Board;Hand Held [employer]   Status Ongoing; Target goal - three weeks; Target goal - four weeks   Interventions ADL Training;Assistive Device   Bathing Goal   Shower/bathe self Goal 03  Partial/moderate assistance - [place] does less than half the effort  [place] lifts or holds trunk or limbs and provides more than half the effort  Environment Seated;Standing   Status Ongoing; Target goal - three weeks; Target goal - four weeks   Intervention ADL Training;Assistive Device; Neuromuscular Education; Therapeutic Exercise   Upper Body Dressing Goal   Upper body dressing Goal 04  Supervision or touching assistance- [place] provides VERBAL CUES or supervision throughout activity     Task Upper Body   Environment Seated   Status Ongoing; Target goal - three weeks; Target goal - four weeks   Intervention Balance Work;Neuromuscular Education; Therapeutic Exercise; Tolerance Work   Lower Body Dressing Goal   Lower body dressing Goal 03  Partial/moderate assistance - [place] does less than half the effort  [place] lifts or holds trunk or limbs and provides more than half the effort  Putting on/taking off footwear Goal 03  Partial/moderate assistance - [place] does less than half the effort  [place] lifts or holds trunk or limbs and provides more than half the effort  Task Lower Body   Environment Seated;Standing   Status Ongoing; Target goal - three weeks; Target goal - four weeks   Intervention Tolerance Work; Therapeutic Exercise;Neuromuscular Education;Balance Work;Assistive Device   Toileting Transfer Goal   Toilet transfer Goal 04  TOUCHING/ STEADYING assistance as patient completes activity  Assistive Device Bedside Commode;Drop Arm Commode;Transfer Board   Status Ongoing; Target goal - three weeks; Target goal - four weeks   Intervention ADL Training;Balance Work;Assistive Device   Toileting Goal   Toileting hygiene Goal 03  Partial/moderate assistance - [place] does less than half the effort  [place] lifts or holds trunk or limbs and provides more than half the effort  Task Pants Up;Pants Down;Hygiene   Status Ongoing; Target goal - three weeks; Target goal - four weeks   Intervention Assistive Device;Balance [facility]

## 2023-05-13 NOTE — PROGRESS NOTES
"[facility] PT Initial Evaluation     05/13/23 1005   Patient Data   Rehab Impairment Impairment of mobility, safety, Activities of Daily Living (ADLs), and cognitive/communication skills due to Stroke:  01 4  No paresis   Etiologic Diagnosis Left Parietal Stroke, B/L Carotid Artery Stenosis   Date of Onset 04/26/23   Support System   Name pt lives with wife with 1 son locally while dtr lives in [place]  per pt wife can work from home  pt sister is retired and lives locally as well  pt unable to recall family member names except for daughter Uli Matias   Type of Home Single Level  (will need to confirm with family all information pt provided due to aphasia  he gets frustrated but able to be redirected and encourage to talk to facilitate speech recovery)   Method of Entry Stairs   Number of Stairs 2  (\" small steps\" without HR)   First Floor Setup Available Yes   Baseline Information   Vocation   (unable to recall what job he had but states he is on disability post 1st CVA event)   Transportation Family/friends drive   Prior Device(s) Used Roller Walker   Prior Level of Function   Indoor-Mobility (Ambulation) 3  Independent - Patient completed the activities by him/herself, with or without an assistive device, with no assistance from a helper  Stairs 3  Independent - Patient completed the activities by him/herself, with or without an assistive device, with no assistance from a helper     Falls in the Last Year   Number of falls in the past 12 months 0   Patient Preference   Nickname (Patient Preference) Rekha Iba   Psychosocial   Psychosocial (WDL) X   Patient Behaviors/Mood Cooperative  (gets frustrated easily due to speech difficulty)   Restrictions/Precautions   Precautions Fall Risk;Cognitive;Bed/chair alarms;Supervision on toilet/commode;Visual deficit;Aspiration  (R side neglect, full S with meals on regular diet/thins)   Weight Bearing Restrictions No   Pain Assessment   Pain Assessment Tool 0-10   Pain Score No " Pain   Pain Rating: FLACC (Rest) - Face 0   Pain Rating: FLACC (Rest) - Legs 0   Pain Rating: FLACC (Rest) - Activity 0   Pain Rating: FLACC (Rest) - Cry 0   Pain Rating: FLACC (Rest) - Consolability 0   Score: FLACC (Rest) 0   Putting On/Taking Off Footwear   Type of Assistance Needed Physical assistance;Verbal cues   Physical Assistance Level Total assistance   Putting On/Taking Off Footwear CARE Score 1   Transfer Bed/Chair/Wheelchair   Type of Assistance Needed Verbal cues; Physical assistance; Adaptive equipment   Physical Assistance Level Total assistance   Comment unable to complete without assist or AD  with slide board requires max A x 2 transferring to the L side only  due to fatigue, impaired sensation and ongoing R side neglect pt unable to attend to cues/instruction to transfer safely going to the R side  Chair/Bed-to-Chair Transfer CARE Score 1   Roll Left and Right   Type of Assistance Needed Physical assistance;Verbal cues   Physical Assistance Level 51%-75%   Comment mod-max of 1 bed rolling without rail  min A with bedrail and extra time to complete   Roll Left and Right CARE Score 2   Sit to Lying   Type of Assistance Needed Physical assistance;Verbal cues; Adaptive equipment   Physical Assistance Level Total assistance   Comment mod of 2 without rail due to fatigue and dec command follow ability   Sit to Lying CARE Score 1   Lying to Sitting on Side of Bed   Type of Assistance Needed Physical assistance   Physical Assistance Level 76% or more   Comment max of 1 without rail   Lying to Sitting on Side of Bed CARE Score 2   Sit to Stand   Type of Assistance Needed Physical assistance;Verbal cues; Adaptive equipment   Physical Assistance Level Total assistance   Comment mod-max of 2 with HHA and using // bar   assist/max VC to attend to R side of the body   Sit to Stand CARE Score 1   Picking Up Object   Reason if not Attempted Safety concerns   Picking Up Object CARE Score 88   Car Transfer   Reason if not Attempted Safety concerns   Car Transfer CARE Score 88   Walk 10 Feet   Type of Assistance Needed Physical assistance;Verbal cues; Adaptive equipment   Physical Assistance Level Total assistance   Comment mod of 2 inside the // bar with assist to advance R hand  weightshift to advance R LE x 2 trials x 6 ft, 1 trial walking fwd/bwd x 10'  then bilat HHA with 3rd person CFA x 10' x 2 again requires assist to weight shift in order to advance R LE   Walk 10 Feet CARE Score 1   Walk 50 Feet with Two Turns   Reason if not Attempted Safety concerns   Walk 50 Feet with Two Turns CARE Score 88   Walk 150 Feet   Reason if not Attempted Safety concerns   Walk 150 Feet CARE Score 88   Walking 10 Feet on Uneven Surfaces   Reason if not Attempted Safety concerns   Walking 10 Feet on Uneven Surfaces CARE Score 88   Wheel 50 Feet with Two Turns   Type of Assistance Needed Physical assistance;Verbal cues   Physical Assistance Level 76% or more   Comment 100' with rest breaks, max VC and mod-max assist shawn in tight spaces to safely cover distance due to R side neglect, impaired sensation and impaired coordination  pt using L UE/LE only  due to ER tightness vs inc adductor tone? unable to keep R LE on the leg rest requiring max A to reposition each time due to dec awareness and inability to perform repositioning task   Wheel 50 Feet with Two Turns CARE Score 2   Wheel 150 Feet   Reason if not Attempted Safety concerns   Wheel 150 Feet CARE Score 88   Curb or Single Stair   Reason if not Attempted Safety concerns   1 Step (Curb) CARE Score 88   4 Steps   Reason if not Attempted Safety concerns   4 Steps CARE Score 88   12 Steps   Reason if not Attempted Safety concerns   12 Steps CARE Score 88   Comprehension   QI: Comprehension 2  Sometimes Understands: Understands only basic conversations or simple, direct phrases  Frequently requires cues to understand   Expression   QI: Expression 2   Frequently exhibits difficulty with "expressing needs and ideas   RLE Assessment   RLE Assessment WFL   LLE Assessment   LLE Assessment WFL   Coordination   Movements are Fluid and Coordinated 0   Coordination and Movement Description   (unable to follow /copy pronation/supination of hand on lap despite demo instead touch hands on lap then to his chest )   Sensation   Light Touch Severe deficits in the RUE;Severe deficits in the RLE   Propioception   (not tested formally due to aphasia but anticipate severe deficits R UE/LE)   Cognition   Overall Cognitive Status Impaired   Arousal/Participation Alert; Cooperative   Attention Difficulty attending to directions   Orientation Level Oriented to person;Disoriented to place; Disoriented to time  (pt able to recall his name and why he is here due to stroke  But unable to recall name of place and time  noted also demos reading difficulties when asked to read info on the white board for time/date )   Memory Decreased short term memory;Decreased recall of precautions;Decreased recall of recent events   Following Commands Follows one step commands with increased time or repetition   Vision   Vision Comments noted dec R side eye tracking   Perception   Inattention/Neglect Cues to attend to right side of body;Cues to attend right visual field   Motor Planning Hand over hand to sequence tasks  (shawn on R side)   Discharge [address] Retired/not working   Patient's Discharge Plan return home with family support   Patient's Rehab Expectations \" get back to normal where I've been\"   Barriers to Discharge Home Limited Family Support; Unsafe Home Setup; Decreased Strength; Safety Considerations;Decreased Endurance;Decreased Cognitive Function;Depression  (pt claims he is depressed \" because of where I am right now\")   Impressions Pt is a 58year old male s/p L parietal stroke with intervention as follows Right Common Carotid Arteriogram, Left Common Carotid Arteriogram, Left ICA angioplasty and stenting " with distal protection device, Intraprocedural and postprocedural arteriography, Limited Right Femoral Arteriogram   Pt is seen for high complexity eval with medical co-morbidities affecting functional progress include Leukocytosis, Hypokalemia, Prediabetes, Hyponatremia, Chronic low back pain, Chronic Ischemic Right MCA Stroke, Anxiety, Nicotine dependence, Hypertension  Please see medical chart for more comprehensive list  Personal factors affecting pt at time of IE include environmental barrier of [place], limited family availability to provide physical assistance at d/c since wife still works, inability to complete functional household and community distance mobilities  Pt lives at home with wife and was fully indep at baseline without AD on even and uneven surfaces including steps negotiation  On eval pt presentation impacting functional performance include R side neglect, impaired sitting/standing balance/tolerance, poor righting reaction, impaired coordination, impaired sensation/proprioception/kinesthesia, dec endurance, gait dysfunction, impaired cognition, vision deficits, dec insights to current deficits and global aphasia  Due to above mentioned medical co-morbidities and impairments/deficits pt is at high risk for falls, inc risk for hypo/hypertensive episode, at risk for dec skin integrity, at risk for PE & DVT and increase caregiver burden so unsafe to return home at this time  pt requires 2 person assist to complete functional activities safely requiring use of AD, see above info for details of PT evaluation  Pt is a good rehab candidate with anticipated min A goals at least for household distance mobilities using Least restrictive AD with ELOS of 4 weeks  Skilled PT will work on therapeutic exercises, therapeutic activities, NMR, w/c mobility and gait training to improve overall functional indep in order for pt to return home safely with reduce risk for falls     PT Therapy Minutes   PT Time In 1 PT Time Out 1135   PT Total Time (minutes) 90   PT Mode of treatment - Individual (minutes) 90   PT Mode of treatment - Concurrent (minutes) 0   PT Mode of treatment - Group (minutes) 0   PT Mode of treatment - Co-treat (minutes) 0   PT Mode of Treatment - Total time(minutes) 90 minutes   PT Cumulative Minutes 90   Cumulative Minutes   Cumulative therapy minutes 150

## 2023-05-13 NOTE — PLAN OF CARE
Problem: PAIN - ADULT  Goal: Verbalizes/displays adequate comfort level or baseline comfort level  Description: Interventions:  - Encourage patient to monitor pain and request assistance  - Assess pain using appropriate pain scale  - Administer analgesics based on type and severity of pain and evaluate response  - Implement non-pharmacological measures as appropriate and evaluate response  - Consider cultural and social influences on pain and pain management  - Notify physician/advanced practitioner if interventions unsuccessful or patient reports new pain  Outcome: Progressing     Problem: INFECTION - ADULT  Goal: Absence or prevention of progression during hospitalization  Description: INTERVENTIONS:  - Assess and monitor for signs and symptoms of infection  - Monitor lab/diagnostic results  - Monitor all insertion sites, i e  indwelling lines, tubes, and drains  - Monitor endotracheal if appropriate and nasal secretions for changes in amount and color  - [place] appropriate cooling/warming therapies per order  - Administer medications as ordered  - Instruct and encourage patient and family to use good hand hygiene technique  - Identify and instruct in appropriate isolation precautions for identified infection/condition  Outcome: Progressing  Goal: Absence of fever/infection during neutropenic period  Description: INTERVENTIONS:  - Monitor WBC    Outcome: Progressing     Problem: SAFETY ADULT  Goal: Patient will remain free of falls  Description: INTERVENTIONS:  - Educate patient/family on patient safety including physical limitations  - Instruct patient to call for assistance with activity   - Consult OT/PT to assist with strengthening/mobility   - Keep Call bell within reach  - Keep bed low and locked with side rails adjusted as appropriate  - Keep care items and personal belongings within reach  - Initiate and maintain comfort rounds  - Make Fall Risk Sign visible to staff  - Offer Toileting every 2 Hours, in advance of need  - Initiate/Maintain bed/chair alarm  - Obtain necessary fall risk management equipment: alarms  - Apply yellow socks and bracelet for high fall risk patients  - Consider moving patient to room near nurses station  Outcome: Progressing  Goal: Maintain or return to baseline ADL function  Description: INTERVENTIONS:  -  Assess patient's ability to carry out ADLs; assess patient's baseline for ADL function and identify physical deficits which impact ability to perform ADLs (bathing, care of mouth/teeth, toileting, grooming, dressing, etc )  - Assess/evaluate cause of self-care deficits   - Assess range of motion  - Assess patient's mobility; develop plan if impaired  - Assess patient's need for assistive devices and provide as appropriate  - Encourage maximum independence but intervene and supervise when necessary  - Involve family in performance of ADLs  - Assess for home care needs following discharge   - Consider OT consult to assist with ADL evaluation and planning for discharge  - Provide patient education as appropriate  Outcome: Progressing  Goal: Maintains/Returns to pre admission functional level  Description: INTERVENTIONS:  - Perform BMAT or MOVE assessment daily    - Set and communicate daily mobility goal to care team and patient/family/caregiver  - Collaborate with rehabilitation services on mobility goals if consulted  - Perform Range of Motion 3 times a day  - Reposition patient every 2 hours    - Dangle patient 3 times a day  - Stand patient 3 times a day  - Ambulate patient 3 times a day  - Out of bed to chair 3 times a day   - Out of bed for meals 3 times a day  - Out of bed for toileting  - Record patient progress and toleration of activity level   Outcome: Progressing     Problem: DISCHARGE PLANNING  Goal: Discharge to home or other facility with appropriate resources  Description: INTERVENTIONS:  - Identify barriers to discharge w/patient and caregiver  - Arrange for needed discharge resources and transportation as appropriate  - Identify discharge learning needs (meds, wound care, etc )  - Arrange for interpretive services to assist at discharge as needed  - Refer to Case Management Department for coordinating discharge planning if the patient needs post-hospital services based on physician/advanced practitioner order or complex needs related to functional status, cognitive ability, or social support system  Outcome: Progressing     Problem: Prexisting or High Potential for Compromised Skin Integrity  Goal: Skin integrity is maintained or improved  Description: INTERVENTIONS:  - Identify patients at risk for skin breakdown  - Assess and monitor skin integrity  - Assess and monitor nutrition and hydration status  - Monitor labs   - Assess for incontinence   - Turn and reposition patient  - Assist with mobility/ambulation  - Relieve pressure over bony prominences  - Avoid friction and shearing  - Provide appropriate hygiene as needed including keeping skin clean and dry  - Evaluate need for skin moisturizer/barrier cream  - Collaborate with interdisciplinary team   - Patient/family teaching  - Consider wound care consult   Outcome: Progressing     Problem: MOBILITY - ADULT  Goal: Maintain or return to baseline ADL function  Description: INTERVENTIONS:  -  Assess patient's ability to carry out ADLs; assess patient's baseline for ADL function and identify physical deficits which impact ability to perform ADLs (bathing, care of mouth/teeth, toileting, grooming, dressing, etc )  - Assess/evaluate cause of self-care deficits   - Assess range of motion  - Assess patient's mobility; develop plan if impaired  - Assess patient's need for assistive devices and provide as appropriate  - Encourage maximum independence but intervene and supervise when necessary  - Involve family in performance of ADLs  - Assess for home care needs following discharge   - Consider OT consult to assist with ADL evaluation and planning for discharge  - Provide patient education as appropriate  Outcome: Progressing

## 2023-05-13 NOTE — PROGRESS NOTES
SLP [facility]       05/13/23 0800   Patient Data   Rehab Impairment Impairment of mobility, safety, Activities of Daily Living (ADLs), and cognitive/communication skills due to Stroke:  01 4  No paresis   Etiologic Diagnosis Left Parietal Stroke, B/L Carotid Artery Stenosis   Date of Onset 04/26/23   Support System   Name pt lives with wife, will need to confirm further assistance at home and home set up   Prior IADL Participation   Money Management   (wife completes)   Meal Preparation   (wife completes)   Laundry Partial Participation   Home Cleaning   (wife completes)   Patient Preference   Nickname (Patient Preference) Chidi Salgado   Psychosocial   Psychosocial (WDL) X   Patient Behaviors/Mood Cooperative  (frustrated)   Restrictions/Precautions   Precautions Aphasia;Bed/chair alarms;Cognitive; Fall Risk; [place]; Visual deficit;Supervision on toilet/commode   Weight Bearing Restrictions No   Pain Assessment   Pain Assessment Tool 0-10   Pain Score No Pain   Eating Assessment   Swallow Precautions Yes   Bedside Swallow Results Yes   VBS Study Results No   Food To Mouth Yes   Able To Cut Yes   Positioning Upright   Safety Needs Increase Time   Meal Assessed Breakfast   QI: Swallowing/Nutritional Status Therapeutic Diet  (Cardiac; CCD Level 3)   Current Diet Regular; Thin   Intake Mode PO   Finishes Timely No   Opens Packages No   Findings Pt with grossly functional oral and pharyngeal stages of swallow- see SLP rehab note for details  Type of Assistance Needed Supervision;Set-up / [place]; Verbal cues; Incidental touching  (for self feeding assistance)   Physical Assistance Level 25% or less   Eating CARE Score 3   Oral Hygiene   Type of Assistance Needed Physical assistance   Physical Assistance Level 76% or more   Comment seated upright in bed   Oral Hygiene CARE Score 2   Comprehension   Assist Devices   (reports having reading glasses)   Auditory Basic   Visual Basic   Findings Pt completed Bedside WAB assessment- see SLP "rehab note for details  QI: Comprehension 2  Sometimes Understands: Understands only basic conversations or simple, direct phrases  Frequently requires cues to understand   Comprehension (FIM) 3 - Understands basic info/conversation 50-74% of time   Expression   Verbal Basic   Non-Verbal Basic   Intelligibility Phrase   Findings Pt completed Bedside WAB assessment- see SLP rehab note for details  QI: Expression 2  Frequently exhibits difficulty with expressing needs and ideas   Expression (FIM) 2 - Uses only simple expressions or gestures (waves, hello)   Social Interaction   Cooperation with staff   Participation Individual   Behaviors observed   (frustrated)   Findings Pt completed Bedside WAB assessment- see SLP rehab note for details  Social Interaction (FIM) 4 - Interacts 75-89% of time   Problem Solving   Findings Pt completed Bedside WAB assessment- see SLP rehab note for details  Problem solving (FIM) 2 - Solves basic problems 25-49% of time   Memory   Recognize People Yes   Remember Routine No   Initiates Tasks No   Short-Term Impaired   Long Term Impaired   Recalls Precaution No   Findings Pt completed Bedside WAB assessment- see SLP rehab note for details  Memory (FIM) 2 - Recognizes, recalls/performs 25-49%   Discharge Information   Vocational Plan Retired/not working   Patient's Discharge Plan home with family support   Patient's Rehab Expectations \"I want to get out and back to myself\"   Barriers to Discharge Home Limited Family Support;Decreased Cognitive Function;Decreased Strength;Decreased Endurance; Safety Considerations   Impressions Pt is a 57 yo male with PMHx prediabetes, hypertension, depression, urinary retention on Flomax, carotid stenosis, depression, prior left MCA and right MCA stroke with aphasia and memory deficits now on Aricept  who presented to the [facility] on 4/26/2023 with AMSconfusion with abnormal speech and behavior similar to a previous CVA symptoms from " Left MCA CVA on 4/16/23  He also had a right MCA stroke status post thrombectomy back in 2019  MRI 5/2 showed left parietal stroke that appears larger  He underwent angiogram to assess for vasculitis, and he is now s/p left ICA stent 5/4 without signs of vasculitis  Pt was approved for acute rehab and transferred on 5/12/2023  Orders received for skilled speech therapy assessment which was completed with Bedside Swallow as well as speech/language assessment  Initial interview with review of history, orientation and events completed as well however given severity of aphasia, will need to follow up with spouse for details  Further assessment time required to complete additional language assessments as warranted  At this time, pt is a good rehab candidate to achieve min A to supervision communication skills while in the acute rehab center w/ anticipated discharge home w/ family support/supervision  Current barriers which present at this time include: decreased processing, decreased ST/working memory, word finding deficits, decreased executive function skills (problem solving, reasoning, organization, judgement) and overall insight to deficits which currently impact safety and functional mobility  ELOS ~ 4 weeks  Pt will benefit from skilled SLP services at this time to maximize overall functional cognitive linguistic skills to decrease burden of care for family at time of discharge     SLP Therapy Minutes   SLP Time In 0800   SLP Time Out 0930   SLP Total Time (minutes) 90   SLP Mode of treatment - Individual (minutes) 90   SLP Mode of treatment - Concurrent (minutes) 0   SLP Mode of treatment - Group (minutes) 0   SLP Mode of treatment - Co-treat (minutes) 0   SLP Mode of Treatment - Total time(minutes) 90 minutes   SLP Cumulative Minutes 90   Cumulative Minutes   Cumulative therapy minutes 150

## 2023-05-14 PROCEDURE — 99232 SBSQ HOSP IP/OBS MODERATE 35: CPT | Performed by: PHYSICIAN ASSISTANT

## 2023-05-14 PROCEDURE — 97112 NEUROMUSCULAR REEDUCATION: CPT

## 2023-05-14 PROCEDURE — 97530 THERAPEUTIC ACTIVITIES: CPT

## 2023-05-14 RX ADMIN — DONEPEZIL HYDROCHLORIDE 5 MG: 5 TABLET ORAL at 08:31

## 2023-05-14 RX ADMIN — ATORVASTATIN CALCIUM 40 MG: 40 TABLET, FILM COATED ORAL at 08:31

## 2023-05-14 RX ADMIN — HEPARIN SODIUM 5000 UNITS: 5000 INJECTION INTRAVENOUS; SUBCUTANEOUS at 22:16

## 2023-05-14 RX ADMIN — CITALOPRAM HYDROBROMIDE 20 MG: 20 TABLET ORAL at 08:31

## 2023-05-14 RX ADMIN — TICAGRELOR 90 MG: 90 TABLET ORAL at 22:16

## 2023-05-14 RX ADMIN — HEPARIN SODIUM 5000 UNITS: 5000 INJECTION INTRAVENOUS; SUBCUTANEOUS at 13:51

## 2023-05-14 RX ADMIN — TICAGRELOR 90 MG: 90 TABLET ORAL at 08:38

## 2023-05-14 RX ADMIN — LIDOCAINE 5% 1 PATCH: 700 PATCH TOPICAL at 08:31

## 2023-05-14 RX ADMIN — TAMSULOSIN HYDROCHLORIDE 0.4 MG: 0.4 CAPSULE ORAL at 17:17

## 2023-05-14 RX ADMIN — SENNOSIDES, DOCUSATE SODIUM 2 TABLET: 8.6; 5 TABLET ORAL at 17:17

## 2023-05-14 RX ADMIN — HEPARIN SODIUM 5000 UNITS: 5000 INJECTION INTRAVENOUS; SUBCUTANEOUS at 05:54

## 2023-05-14 RX ADMIN — PANTOPRAZOLE SODIUM 20 MG: 20 TABLET, DELAYED RELEASE ORAL at 05:54

## 2023-05-14 RX ADMIN — ASPIRIN 81 MG: 81 TABLET, COATED ORAL at 08:31

## 2023-05-14 RX ADMIN — LOSARTAN POTASSIUM 50 MG: 50 TABLET, FILM COATED ORAL at 08:31

## 2023-05-14 RX ADMIN — ACETAMINOPHEN 975 MG: 325 TABLET ORAL at 05:54

## 2023-05-14 RX ADMIN — METOPROLOL SUCCINATE 100 MG: 100 TABLET, EXTENDED RELEASE ORAL at 08:31

## 2023-05-14 RX ADMIN — ACETAMINOPHEN 975 MG: 325 TABLET ORAL at 22:16

## 2023-05-14 RX ADMIN — SENNOSIDES, DOCUSATE SODIUM 2 TABLET: 8.6; 5 TABLET ORAL at 08:31

## 2023-05-14 RX ADMIN — ACETAMINOPHEN 975 MG: 325 TABLET ORAL at 13:52

## 2023-05-14 RX ADMIN — HYDROCHLOROTHIAZIDE 12.5 MG: 12.5 TABLET ORAL at 08:30

## 2023-05-14 RX ADMIN — DONEPEZIL HYDROCHLORIDE 5 MG: 5 TABLET ORAL at 17:17

## 2023-05-14 NOTE — PROGRESS NOTES
"Occupational Therapy Treatment Note         05/14/23 0840   Pain Assessment   Pain Assessment Tool FLACC   Pain Rating: FLACC (Rest) - Face 0   Pain Rating: FLACC (Rest) - Legs 0   Pain Rating: FLACC (Rest) - Activity 0   Pain Rating: FLACC (Rest) - Cry 0   Pain Rating: FLACC (Rest) - Consolability 0   Score: FLACC (Rest) 0   Pain Rating: FLACC (Activity) - Face 0   Pain Rating: FLACC (Activity) - Legs 0   Pain Rating: FLACC (Activity) - Activity 0   Pain Rating: FLACC (Activity) - Cry 0   Pain Rating: FLACC (Activity) - Consolability 0   Score: FLACC (Activity) 0   Restrictions/Precautions   Precautions Bed/chair alarms;Cognitive; Fall Risk;Supervision on toilet/commode;Visual deficit; Aphasia; Aspiration   Lifestyle   Autonomy \"I wouldn't do that, I love my kids\"  Pt reporting feeling increased depression since this stroke, expressing \"others weren't so bad\"  When questioned if pt ever has thoughts of harming himself pt denies     Shower/Bathe Self   Type of Assistance Needed Physical assistance   Physical Assistance Level Total assistance   Comment bed level   Shower/Bathe Self CARE Score 1   Upper Body Dressing   Type of Assistance Needed Physical assistance   Physical Assistance Level Total assistance   Upper Body Dressing CARE Score 1   Lower Body Dressing   Type of Assistance Needed Physical assistance   Physical Assistance Level Total assistance   Comment bed level ADL   Lower Body Dressing CARE Score 1   Putting On/Taking Off Footwear   Type of Assistance Needed Physical assistance   Physical Assistance Level Total assistance   Putting On/Taking Off Footwear CARE Score 1   Roll Left and Right   Type of Assistance Needed Physical assistance   Physical Assistance Level 51%-75%   Roll Left and Right CARE Score 2   Sit to Lying   Type of Assistance Needed Physical assistance   Physical Assistance Level Total assistance   Sit to Lying CARE Score 1   Lying to Sitting on Side of Bed   Type of Assistance Needed " Physical assistance   Physical Assistance Level Total assistance   Lying to Sitting on Side of Bed CARE Score 1   Sit to Stand   Type of Assistance Needed Physical assistance; Adaptive equipment   Physical Assistance Level Total assistance   Comment mod/max assist x2 B/L at bed rail to trial swap out  pt tolerates x1 minute of stance then x30 seconds, R knee guarded  Sit to Stand CARE Score 1   Bed-Chair Transfer   Type of Assistance Needed Physical assistance; Adaptive equipment   Physical Assistance Level Total assistance   Comment trialed beasy board  2* poor motor planning pt benefits from visualization of demonstration on how to lean forward and scoot his bottom across board  Initially pt required max assist x2 to L, then progressed to mod assist x1 with min assist of 2nd person, but this was only going to L side  Going to R pt continues to require max assist x2 with beasy board  Pt with poor motor planning during transfers, uses R UE to grab at staff 2* poor motor planning  At this time recommend pt is transferred only in therapy and do not recommend sitting OOB unsupervised  Spoke with PT Myron Saez, plan for therapy to trial angela lift to bariatric tilt in space w/c as anticipate pt would slide in recliner chair  Chair/Bed-to-Chair Transfer CARE Score 1   Toileting Hygiene   Type of Assistance Needed Physical assistance   Physical Assistance Level Total assistance   Comment bed level   Yoan Gauthier Vei [address] Score 1   Toilet Transfer   Type of Assistance Needed Physical assistance   Physical Assistance Level Total assistance   Comment pt could complete swap out to [facility] using assist x2 B/L at bed rail and assist of 3rd to swap out  however recommend this is trialed during therapy only   otherwise recommend bed pan   Toilet Transfer CARE Score 1   Cognition   Overall Cognitive Status Impaired   Arousal/Participation Alert   Attention Difficulty attending to directions   Orientation Level Oriented to person;Oriented "to place;Oriented to situation   Memory Decreased recall of precautions;Decreased recall of recent events;Decreased short term memory   Following Commands Follows one step commands with increased time or repetition   Comments Pt presents with expressive > receptive aphasia but also impaired motor planning, which greatly impacts command following during transfers  Of note, when given choices pt able to choose correct age, today he was able to recall being at [facility] and stated he had a \"loke\" - meaning stroke  Vision   Vision Comments Visual assessment limited 2* expressive > receptive aphasia and impaired command following  However pt presents with B/L nystagmus during horizontal and vertical scanning  Anticipate R peripheral field cut  Perception   Perception Yes   Left Right Discrimination impaired right/left discrimination and body part awareness, but also noted with aphasia   Right Attention impaired R attention, anticipate likely R peripheral field cut as well   Activity Tolerance   Activity Tolerance Patient limited by fatigue   Assessment   Treatment Assessment Pt participated in skilled OT tx session  See above for further details on functional performance  At this time recommend transfers with use of beasy board/sliding board during therapy only and mainly transferring to L side only  At this time pt is not safe to transfer with this method outside of therapy, and therefore recommend next therapy session trials angela lift  Anticipate pt would slide in recliner chair and therefore recommend bariatric black tilt in space w/c trial, but anticipate pt will need supervision while OOB and therefore would benefit from trial of sitting OOB during meals as pt requires supervision/assist for meals   Pt presents with fairly good strength in R UE but very impaired motor planning and coordination, which impacts functional use of R UE and generalized impaired motor planning impacts command following and ability to " "safely complete transfers  Visual assessment limited 2* expressive > receptive aphasia but anticipate R periheral field cut and in general R perceptual inattention, nystagmus B/L also noted  After assessment of transfers, pt returned to bed and engaged in repetitive command following for motor planning focused on lifting UEs \"up/down\" \"open/close\" hand, \"thumbs up/thumbs down\" \"hold up 1-5 fingers\" - pt accurate initially less than 25% of the time even when constantly provided with visual example of what to perform  Recommend therapy trial angela lift to bariatric tilt in space w/c and sessions continue to focus on motor planning, command following, scooting (in preparation for improving sliding board transfers)  Pt left positioned in bed with call bell in reach and bed alarm on  Pt will benefit from OT/PT cotx sessions as benefits from two skilled therapists providing direction and assessing pt's needs/movements during transfers 2* impulsivity, impaired cognition and language, as well as physical deficits described above  Prognosis Fair   Problem List Decreased strength;Decreased range of motion;Decreased endurance; Impaired balance;Decreased mobility; Decreased coordination;Decreased cognition; Impaired judgement;Decreased safety awareness; Impaired vision; Impaired sensation; Impaired tone   Barriers to Discharge Inaccessible home environment;Decreased caregiver support   Plan   Treatment/Interventions Functional transfer training;ADL retraining; Therapeutic exercise; Endurance training;Cognitive reorientation;Patient/family training;Equipment eval/education;Gait training;Bed mobility; Compensatory technique education   Progress Slow progress, decreased activity tolerance   OT Therapy Minutes   OT Time In 0840   OT Time Out 0950   OT Total Time (minutes) 70   OT Mode of treatment - Individual (minutes) 70   OT Mode of treatment - Concurrent (minutes) 0   OT Mode of treatment - Group (minutes) 0   OT Mode of treatment - " Co-treat (minutes) 0   OT Mode of Treatment - Total time(minutes) 70 minutes   OT Cumulative Minutes 130   Therapy Time missed   Time missed?  No

## 2023-05-14 NOTE — PLAN OF CARE
Problem: PAIN - ADULT  Goal: Verbalizes/displays adequate comfort level or baseline comfort level  Description: Interventions:  - Encourage patient to monitor pain and request assistance  - Assess pain using appropriate pain scale  - Administer analgesics based on type and severity of pain and evaluate response  - Implement non-pharmacological measures as appropriate and evaluate response  - Consider cultural and social influences on pain and pain management  - Notify physician/advanced practitioner if interventions unsuccessful or patient reports new pain  Outcome: Progressing     Problem: INFECTION - ADULT  Goal: Absence or prevention of progression during hospitalization  Description: INTERVENTIONS:  - Assess and monitor for signs and symptoms of infection  - Monitor lab/diagnostic results  - Monitor all insertion sites, i e  indwelling lines, tubes, and drains  - Monitor endotracheal if appropriate and nasal secretions for changes in amount and color  - [place] appropriate cooling/warming therapies per order  - Administer medications as ordered  - Instruct and encourage patient and family to use good hand hygiene technique  - Identify and instruct in appropriate isolation precautions for identified infection/condition  Outcome: Progressing  Goal: Absence of fever/infection during neutropenic period  Description: INTERVENTIONS:  - Monitor WBC    Outcome: Progressing     Problem: SAFETY ADULT  Goal: Patient will remain free of falls  Description: INTERVENTIONS:  - Educate patient/family on patient safety including physical limitations  - Instruct patient to call for assistance with activity   - Consult OT/PT to assist with strengthening/mobility   - Keep Call bell within reach  - Keep bed low and locked with side rails adjusted as appropriate  - Keep care items and personal belongings within reach  - Initiate and maintain comfort rounds  - Make Fall Risk Sign visible to staff  - Offer Toileting every 2 Hours, in advance of need  - Initiate/Maintain Bed Alarm alarm  - Obtain necessary fall risk management equipment: bed Alarm  - Apply yellow socks and bracelet for high fall risk patients  - Consider moving patient to room near nurses station  Outcome: Progressing  Goal: Maintain or return to baseline ADL function  Description: INTERVENTIONS:  -  Assess patient's ability to carry out ADLs; assess patient's baseline for ADL function and identify physical deficits which impact ability to perform ADLs (bathing, care of mouth/teeth, toileting, grooming, dressing, etc )  - Assess/evaluate cause of self-care deficits   - Assess range of motion  - Assess patient's mobility; develop plan if impaired  - Assess patient's need for assistive devices and provide as appropriate  - Encourage maximum independence but intervene and supervise when necessary  - Involve family in performance of ADLs  - Assess for home care needs following discharge   - Consider OT consult to assist with ADL evaluation and planning for discharge  - Provide patient education as appropriate  Outcome: Progressing  Goal: Maintains/Returns to pre admission functional level  Description: INTERVENTIONS:  - Perform BMAT or MOVE assessment daily    - Set and communicate daily mobility goal to care team and patient/family/caregiver  - Collaborate with rehabilitation services on mobility goals if consulted  - Perform Range of Motion 3 times a day  - Reposition patient every 2 hours    - Dangle patient 3 times a day  - Stand patient 3 times a day  - Ambulate patient 3 times a day  - Out of bed to chair 3 times a day   - Out of bed for meals 3 times a day  - Out of bed for toileting  - Record patient progress and toleration of activity level   Outcome: Progressing     Problem: DISCHARGE PLANNING  Goal: Discharge to home or other facility with appropriate resources  Description: INTERVENTIONS:  - Identify barriers to discharge w/patient and caregiver  - Arrange for needed discharge resources and transportation as appropriate  - Identify discharge learning needs (meds, wound care, etc )  - Arrange for interpretive services to assist at discharge as needed  - Refer to Case Management Department for coordinating discharge planning if the patient needs post-hospital services based on physician/advanced practitioner order or complex needs related to functional status, cognitive ability, or social support system  Outcome: Progressing     Problem: Prexisting or High Potential for Compromised Skin Integrity  Goal: Skin integrity is maintained or improved  Description: INTERVENTIONS:  - Identify patients at risk for skin breakdown  - Assess and monitor skin integrity  - Assess and monitor nutrition and hydration status  - Monitor labs   - Assess for incontinence   - Turn and reposition patient  - Assist with mobility/ambulation  - Relieve pressure over bony prominences  - Avoid friction and shearing  - Provide appropriate hygiene as needed including keeping skin clean and dry  - Evaluate need for skin moisturizer/barrier cream  - Collaborate with interdisciplinary team   - Patient/family teaching  - Consider wound care consult   Outcome: Progressing     Problem: MOBILITY - ADULT  Goal: Maintain or return to baseline ADL function  Description: INTERVENTIONS:  -  Assess patient's ability to carry out ADLs; assess patient's baseline for ADL function and identify physical deficits which impact ability to perform ADLs (bathing, care of mouth/teeth, toileting, grooming, dressing, etc )  - Assess/evaluate cause of self-care deficits   - Assess range of motion  - Assess patient's mobility; develop plan if impaired  - Assess patient's need for assistive devices and provide as appropriate  - Encourage maximum independence but intervene and supervise when necessary  - Involve family in performance of ADLs  - Assess for home care needs following discharge   - Consider OT consult to assist with ADL evaluation and planning for discharge  - Provide patient education as appropriate  Outcome: Progressing  Goal: Maintains/Returns to pre admission functional level  Description: INTERVENTIONS:  - Perform BMAT or MOVE assessment daily    - Set and communicate daily mobility goal to care team and patient/family/caregiver  - Collaborate with rehabilitation services on mobility goals if consulted  - Perform Range of Motion 3 times a day  - Reposition patient every 2 hours    - Dangle patient 3 times a day  - Stand patient 3 times a day  - Ambulate patient 3 times a day  - Out of bed to chair 3 times a day   - Out of bed for meals 3 times a day  - Out of bed for toileting  - Record patient progress and toleration of activity level   Outcome: Progressing

## 2023-05-14 NOTE — PROGRESS NOTES
05/14/23 1230   Pain Assessment   Pain Rating: FLACC (Rest) - Face 0   Pain Rating: FLACC (Rest) - Legs 0   Pain Rating: FLACC (Rest) - Activity 0   Pain Rating: FLACC (Rest) - Cry 0   Pain Rating: FLACC (Rest) - Consolability 0   Score: FLACC (Rest) 0   Pain Rating: FLACC (Activity) - Face 0   Pain Rating: FLACC (Activity) - Legs 0   Pain Rating: FLACC (Activity) - Activity 0   Pain Rating: FLACC (Activity) - Cry 0   Pain Rating: FLACC (Activity) - Consolability 0   Score: FLACC (Activity) 0   Restrictions/Precautions   Precautions Aphasia; Aspiration;Bed/chair alarms;Cognitive; Fall Risk; [place]; Supervision on toilet/commode   Cognition   Overall Cognitive Status Impaired   Arousal/Participation Cooperative   Subjective   Subjective pt reported feelng okay and willing to get OOB   Roll Left and Right   Type of Assistance Needed Physical assistance   Physical Assistance Level 76% or more   Roll Left and Right CARE Score 2   Transfer Bed/Chair/Wheelchair   Findings session focused on reviewing/practice all transfer methods to establish best POC for schedule and safety here, etc  practiced mobility wtih rolling, bridging, scooting up in bed; pt senait to bridge with A to get BLE into position  worked onjerome cooper, with pt wanting to roll L when asked to roll R due to inattention/dec spatial awareness  used hoyerl ift this session to tilt in space WC  used Blue straps on top and black straps on bottom  No issues with angela transfer, just need to adjust positioning of the angela lift legs under the bed frame for spacing purposes  Needed to help lift pt onto bed due to height of bed not being low enough  Could try shorter straps next session to see if this helps this process, would just need ot be careful that his head isn't too close to the boom  Worked on weight shifting laterally and on an angle to put angela sling in place and remove it   No issues with pt being too close to front edge of WC, but the chair was tilted "back to make sure he woudln't slide forward  used 4\" block under feet as when chair ist ilted back his feet dont hit the floor  VC need to be provided to pt for sequencing, but he was able to keep his hands held together the entire time, out to chair and back to bed  education provided to pt and his wife regarding purpose of angela as a back up transfer method to inc his time OOB,e sp if he is tired/more fatigued when its time to transfer back  Reivewed his fall risk facotrs (including dec trunk control, dec righitng reactions, R inattention, aphasia) as pt has high fall risk factors  Currently not recommending that pt is unattented when OOB due to dec ability to initiate asking for A and due to poor trunk control/dec righitng reactions  PLan for tomorrow morning is to get OOB prior to ST and assess tolerance to sitting up OOB,also due to him having incontinent bowel movements  Therapy teamn to determine overall plan/therapy schedule fo OOB and RN needs, etc    Other Comments   Comments Pt's wife was asking about d/c home; reviewed that as of today, plan is that he would need physical A for all mobility at home  Recommended home health aides as pt's wife rpeorts taht she works from home, but she wont be able to step away wehn she is working  They are familiar with overall stroke recovery as he had a stroke about 4 years ago  reviewed how the R inattention is a barrier to progressing overall functional mobiltiy and interventions intiially need to focus on that (vestibular exercises, etc\")  Pt's wife feels that he week he laid in bed kathy cute care really set him back, as he was able to sit up in recliner chair and they were senait to walk wtih him with a RW  Assessment   Treatment Assessment Pt cont to present wtih notable deficits in overall functional mobility and will benefit from skilled PT to focus first in R inattention and dec cardiovascular endurance to progress christiano, walking   Pt has good voluntary  " movement of RUE in open chain positions, but does demonstrate dec motor planning overall (RUE/RLE and trunk movements)  Pt will benefit from skilled PT to focus on these interventions and then progress to more dynamic walking/standing activities  Barriers to Discharge Inaccessible home environment;Decreased caregiver support   PT Barriers   Physical Impairment Decreased strength;Decreased range of motion;Decreased endurance; Impaired balance;Decreased mobility; Decreased coordination;Decreased cognition; Impaired judgement;Decreased safety awareness; Impaired vision; Impaired sensation;Obesity   Functional Limitation Car transfers; Ramp negotiation;Stair negotiation;Standing;Transfers; Walking; Wheelchair management   Plan   Treatment/Interventions LE strengthening/ROM; Functional transfer training; Therapeutic exercise;Cognitive reorientation; Endurance training;Patient/family training;Equipment eval/education; Bed mobility;Gait training; Compensatory technique education   Progress Slow progress, decreased activity tolerance   Recommendation   PT Discharge Recommendation Home with home health rehabilitation   PT Therapy Minutes   PT Time In 1230   PT Time Out 1355   PT Total Time (minutes) 85   PT Mode of treatment - Individual (minutes) 85   PT Mode of treatment - Concurrent (minutes) 0   PT Mode of treatment - Group (minutes) 0   PT Mode of treatment - Co-treat (minutes) 0   PT Mode of Treatment - Total time(minutes) 85 minutes   PT Cumulative Minutes 175   Therapy Time missed   Time missed?  No

## 2023-05-14 NOTE — PROGRESS NOTES
Internal Medicine Progress Note  Patient: Roslyn Husbands  Age/sex: 58 y o  male  Medical Record #: [id number]      ASSESSMENT/PLAN: (Interval History)  Roslyn Husbands is seen and examined and management for following issues:    Acute multifocal ischemic strokes  • Occurred in different arterial distributions  • IVY was negative for thrombus/PFO  • Felt not to be vasculitis and negative by a-gram 5/4/23  • Continue ASA/Brilinta  • Continue statin     Left ICA stenosis  • Was noted to have all of the CVAs in last month have been in left anterior circulation  • S/p PTA/stent 5/4/23  • Recheck carotid doppler 6 weeks and see NS  • Continue AP/statin     Right ICA stenosis  • To followup with Vasc surgery as OP  • Continue AP/statin     LOOP recorder  • Was placed 3/2019  • Neuro wants it replaced = for OP to Cardiology     Leukocytosis  • Had no fevers  • CXR and Procal were normal  • Stable  HTN  • Home:  Toprol 100mg qd/Norvasc 10mg qd/Losartan 50mg qd/HCTZ 25mg qd/Hydralazine 25mg TID  • Here:  Toprol 100mg qd/Losartan 50mg qd/HCTZ 12 5mg qd  • HCTZ was decreased to 12 5mg qd today before transfer here to [facility]     Pre DM  • HbA1C 5 8  • Takes Metformin at home but currently not on it in hospital  • Wife didn't want him to have Accuchecks noting that he was pre-DM so they were stopped in hospital   The BSs had been very normal in acute anyway  • Will watch fastings with BMPs  • For DM diet which he had not been on in acute  • Dr Candance Reichert spoke with him about that and he was ok with that plan     Depression  • Continue celexa as at home     Memory loss  • Continue Aricept as at home  • He has had memory issues since his CVA in 2019     Urine retention  • Has jo  • VT per PMR  • Continue Flomax     Chronic LBP  • MRI showed advanced multilevel spondylosis, slightly progressed on the right at L2-3 compared to the prior study but otherwise stable     • Pain management per PMR     AAA  • Found on L-spine MRI  • Measures 3 4 cm  • OP followup    Hyponatremia  · Na 134  · Will monitor  The above assessment and plan was reviewed and updated as determined by my evaluation of the patient on 5/14/2023      Labs:   Results from last 7 days   Lab Units 05/13/23  0559   WBC Thousand/uL 12 84*   HEMOGLOBIN g/dL 12 8   HEMATOCRIT % 36 5   PLATELETS Thousands/uL 311     Results from last 7 days   Lab Units 05/13/23  0559   SODIUM mmol/L 134*   POTASSIUM mmol/L 3 5   CHLORIDE mmol/L 104   CO2 mmol/L 26   BUN mg/dL 27*   CREATININE mg/dL 1 22   CALCIUM mg/dL 9 0             Results from last 7 days   Lab Units 05/08/23  1642 05/07/23  1521   POC GLUCOSE mg/dl 112 198*       Review of Scheduled Meds:  Current Facility-Administered Medications   Medication Dose Route Frequency Provider Last Rate   • acetaminophen  975 mg Oral Q8H [address] Rollo Amass, DO     • aspirin  81 mg Oral Daily Rollo Amass, DO     • atorvastatin  40 mg Oral Daily Rollo Amass, DO     • bisacodyl  10 mg Rectal Daily PRN Rollo Amass, DO     • calcium carbonate  1,000 mg Oral Daily PRN Rollo Amass, DO     • citalopram  20 mg Oral Daily Rollo Amass, DO     • donepezil  5 mg Oral BID Rollo Amass, DO     • heparin (porcine)  5,000 Units Subcutaneous Q8H [address] Rollo Amass, DO     • hydrochlorothiazide  12 5 mg Oral Daily Rollo Amass, DO     • lidocaine  1 patch Topical Daily Rollo Amass, DO     • losartan  50 mg Oral Daily Rollo Amass, DO     • methocarbamol  500 mg Oral Q6H PRN Rollo Amass, DO     • metoprolol succinate  100 mg Oral Daily Rollo Amass, DO     • ondansetron  4 mg Oral Q6H PRN Rollo Amass, DO     • oxyCODONE  5 mg Oral Q6H PRN Rollo Amass, DO     • oxyCODONE  2 5 mg Oral Q6H PRN Rollo Amass, DO     • pantoprazole  20 mg Oral Early Morning Rollo Amass, DO     • polyethylene glycol  17 g Oral QAM Rollo Amass, DO     • senna  2 tablet Oral Daily PRN Rollo Amass, DO     • senna-docusate sodium  2 tablet Oral BID Prentis Lapine Gloria DO     • tamsulosin  0 4 mg Oral Daily With 1 \[facility]\"", DO     • ticagrelor  90 mg Oral Q12H [address], DO         Subjective/ HPI: Patient seen and examined  Patients overnight issues or events were reviewed with nursing or staff during rounds or morning huddle session  New or overnight issues include the following:     Pt seen in his room  He denies any other complaints  ROS:   A 10 point ROS was performed; negative except as noted above  Imaging:     No orders to display       *Labs /Radiology studies Reviewed  *Medications  reviewed and reconciled as needed  *Please refer to order section for additional ordered labs studies  *Case discussed with primary attending during morning huddle case rounds    Physical Examination:  Vitals:   Vitals:    05/13/23 1335 05/13/23 2030 05/14/23 0554 05/14/23 0831   BP: 116/56 136/63 114/57 121/62   BP Location: Right arm Left arm Right arm    Pulse: 69 73 70 65   Resp: 18 18 18    Temp: 98 3 °F (36 8 °C) 97 9 °F (36 6 °C) 98 3 °F (36 8 °C)    TempSrc: Oral Oral Oral    SpO2: 97% 97% 98%    Weight:         General Appearance: no distress, conversive, interactive  HEENT: PERRLA, conjuctiva normal; oropharynx clear; mucous membranes moist;   Neck:  Supple, no lymphadenopathy or thyromegaly  Lungs: CTA, normal respiratory effort, no retractions, expiratory effort normal  CV: regular rate and rhythm , PMI normal   ABD: soft non tender, no masses , no hepatic or splenomegaly  EXT: DP pulses intact, no lymphadenopathy, no edema  Skin: normal turgor, normal texture, no rash  Psych: affect normal, mood normal  Neuro: Expressive aphasia  Rt sided weakness    The above physical exam was reviewed and updated as determined by my evaluation of the patient on 5/14/2023  Invasive Devices     Peripheral Intravenous Line  Duration           Peripheral IV 05/10/23 Dorsal (posterior); Left Hand 4 days          Drain  Duration           Urethral Catheter Latex 16 Fr  5 days                   VTE Pharmacologic Prophylaxis: Heparin  Code Status: Level 1 - Full Code  Current Length of Stay: 2 day(s)      Total time spent:  30 minutes with more than 50% spent counseling/coordinating care  Counseling includes discussion with patient re: progress  and discussion with patient of his/her current medical state/information  Coordination of patient's care was performed in conjunction with primary service  Time invested included review of patient's labs, vitals, and management of their comorbidities with continued monitoring  In addition, this patient was discussed with medical team including physician and advanced extenders  The care of the patient was extensively discussed and appropriate treatment plan was formulated unique for this patient  Medical decision making for the day was made by supervising physician unless otherwise noted in their attestation statement  ** Please Note:  voice to text software may have been used in the creation of this document   Although proof errors in transcription or interpretation are a potential of such software**

## 2023-05-14 NOTE — PLAN OF CARE
Problem: PAIN - ADULT  Goal: Verbalizes/displays adequate comfort level or baseline comfort level  Description: Interventions:  - Encourage patient to monitor pain and request assistance  - Assess pain using appropriate pain scale  - Administer analgesics based on type and severity of pain and evaluate response  - Implement non-pharmacological measures as appropriate and evaluate response  - Consider cultural and social influences on pain and pain management  - Notify physician/advanced practitioner if interventions unsuccessful or patient reports new pain  Outcome: Progressing     Problem: INFECTION - ADULT  Goal: Absence or prevention of progression during hospitalization  Description: INTERVENTIONS:  - Assess and monitor for signs and symptoms of infection  - Monitor lab/diagnostic results  - Monitor all insertion sites, i e  indwelling lines, tubes, and drains  - Monitor endotracheal if appropriate and nasal secretions for changes in amount and color  - [place] appropriate cooling/warming therapies per order  - Administer medications as ordered  - Instruct and encourage patient and family to use good hand hygiene technique  - Identify and instruct in appropriate isolation precautions for identified infection/condition  Outcome: Progressing  Goal: Absence of fever/infection during neutropenic period  Description: INTERVENTIONS:  - Monitor WBC    Outcome: Progressing     Problem: SAFETY ADULT  Goal: Patient will remain free of falls  Description: INTERVENTIONS:  - Educate patient/family on patient safety including physical limitations  - Instruct patient to call for assistance with activity   - Consult OT/PT to assist with strengthening/mobility   - Keep Call bell within reach  - Keep bed low and locked with side rails adjusted as appropriate  - Keep care items and personal belongings within reach  - Initiate and maintain comfort rounds  - Make Fall Risk Sign visible to staff  - Offer Toileting every 2 Hours, in advance of need  - Initiate/Maintain bed/chair alarm  - Obtain necessary fall risk management equipment: alarms  - Apply yellow socks and bracelet for high fall risk patients  - Consider moving patient to room near nurses station  Outcome: Progressing  Goal: Maintain or return to baseline ADL function  Description: INTERVENTIONS:  -  Assess patient's ability to carry out ADLs; assess patient's baseline for ADL function and identify physical deficits which impact ability to perform ADLs (bathing, care of mouth/teeth, toileting, grooming, dressing, etc )  - Assess/evaluate cause of self-care deficits   - Assess range of motion  - Assess patient's mobility; develop plan if impaired  - Assess patient's need for assistive devices and provide as appropriate  - Encourage maximum independence but intervene and supervise when necessary  - Involve family in performance of ADLs  - Assess for home care needs following discharge   - Consider OT consult to assist with ADL evaluation and planning for discharge  - Provide patient education as appropriate  Outcome: Progressing  Goal: Maintains/Returns to pre admission functional level  Description: INTERVENTIONS:  - Perform BMAT or MOVE assessment daily    - Set and communicate daily mobility goal to care team and patient/family/caregiver  - Collaborate with rehabilitation services on mobility goals if consulted  - Perform Range of Motion 3 times a day  - Reposition patient every 2 hours    - Dangle patient 3 times a day  - Stand patient 3 times a day  - Ambulate patient 3 times a day  - Out of bed to chair 3 times a day   - Out of bed for meals 3 times a day  - Out of bed for toileting  - Record patient progress and toleration of activity level   Outcome: Progressing     Problem: DISCHARGE PLANNING  Goal: Discharge to home or other facility with appropriate resources  Description: INTERVENTIONS:  - Identify barriers to discharge w/patient and caregiver  - Arrange for needed discharge resources and transportation as appropriate  - Identify discharge learning needs (meds, wound care, etc )  - Arrange for interpretive services to assist at discharge as needed  - Refer to Case Management Department for coordinating discharge planning if the patient needs post-hospital services based on physician/advanced practitioner order or complex needs related to functional status, cognitive ability, or social support system  Outcome: Progressing     Problem: Prexisting or High Potential for Compromised Skin Integrity  Goal: Skin integrity is maintained or improved  Description: INTERVENTIONS:  - Identify patients at risk for skin breakdown  - Assess and monitor skin integrity  - Assess and monitor nutrition and hydration status  - Monitor labs   - Assess for incontinence   - Turn and reposition patient  - Assist with mobility/ambulation  - Relieve pressure over bony prominences  - Avoid friction and shearing  - Provide appropriate hygiene as needed including keeping skin clean and dry  - Evaluate need for skin moisturizer/barrier cream  - Collaborate with interdisciplinary team   - Patient/family teaching  - Consider wound care consult   Outcome: Progressing     Problem: MOBILITY - ADULT  Goal: Maintain or return to baseline ADL function  Description: INTERVENTIONS:  -  Assess patient's ability to carry out ADLs; assess patient's baseline for ADL function and identify physical deficits which impact ability to perform ADLs (bathing, care of mouth/teeth, toileting, grooming, dressing, etc )  - Assess/evaluate cause of self-care deficits   - Assess range of motion  - Assess patient's mobility; develop plan if impaired  - Assess patient's need for assistive devices and provide as appropriate  - Encourage maximum independence but intervene and supervise when necessary  - Involve family in performance of ADLs  - Assess for home care needs following discharge   - Consider OT consult to assist with ADL evaluation and planning for discharge  - Provide patient education as appropriate  Outcome: Progressing  Goal: Maintains/Returns to pre admission functional level  Description: INTERVENTIONS:  - Perform BMAT or MOVE assessment daily    - Set and communicate daily mobility goal to care team and patient/family/caregiver  - Collaborate with rehabilitation services on mobility goals if consulted  - Perform Range of Motion 3 times a day  - Reposition patient every 2 hours    - Dangle patient 3 times a day  - Stand patient 3 times a day  - Ambulate patient 3 times a day  - Out of bed to chair 3 times a day   - Out of bed for meals 3 times a day  - Out of bed for toileting  - Record patient progress and toleration of activity level   Outcome: Progressing

## 2023-05-15 PROBLEM — R89.9 ABNORMAL LABORATORY TEST: Status: ACTIVE | Noted: 2023-05-15

## 2023-05-15 LAB
ANION GAP SERPL CALCULATED.3IONS-SCNC: 0 MMOL/L (ref 4–13)
BASOPHILS # BLD AUTO: 0.26 THOUSANDS/ÂΜL (ref 0–0.1)
BASOPHILS NFR BLD AUTO: 2 % (ref 0–1)
BUN SERPL-MCNC: 22 MG/DL (ref 5–25)
CALCIUM SERPL-MCNC: 9.5 MG/DL (ref 8.3–10.1)
CHLORIDE SERPL-SCNC: 104 MMOL/L (ref 96–108)
CO2 SERPL-SCNC: 28 MMOL/L (ref 21–32)
CREAT SERPL-MCNC: 1.29 MG/DL (ref 0.6–1.3)
EOSINOPHIL # BLD AUTO: 1.57 THOUSAND/ÂΜL (ref 0–0.61)
EOSINOPHIL NFR BLD AUTO: 11 % (ref 0–6)
ERYTHROCYTE [DISTWIDTH] IN BLOOD BY AUTOMATED COUNT: 13.2 % (ref 11.6–15.1)
GFR SERPL CREATININE-BSD FRML MDRD: 58 ML/MIN/1.73SQ M
GLUCOSE P FAST SERPL-MCNC: 111 MG/DL (ref 65–99)
GLUCOSE SERPL-MCNC: 111 MG/DL (ref 65–140)
HCT VFR BLD AUTO: 37.9 % (ref 36.5–49.3)
HGB BLD-MCNC: 13.3 G/DL (ref 12–17)
IMM GRANULOCYTES # BLD AUTO: 0.16 THOUSAND/UL (ref 0–0.2)
IMM GRANULOCYTES NFR BLD AUTO: 1 % (ref 0–2)
LYMPHOCYTES # BLD AUTO: 4.09 THOUSANDS/ÂΜL (ref 0.6–4.47)
LYMPHOCYTES NFR BLD AUTO: 29 % (ref 14–44)
MCH RBC QN AUTO: 32.7 PG (ref 26.8–34.3)
MCHC RBC AUTO-ENTMCNC: 35.1 G/DL (ref 31.4–37.4)
MCV RBC AUTO: 93 FL (ref 82–98)
MONOCYTES # BLD AUTO: 1.52 THOUSAND/ÂΜL (ref 0.17–1.22)
MONOCYTES NFR BLD AUTO: 11 % (ref 4–12)
NEUTROPHILS # BLD AUTO: 6.63 THOUSANDS/ÂΜL (ref 1.85–7.62)
NEUTS SEG NFR BLD AUTO: 46 % (ref 43–75)
NRBC BLD AUTO-RTO: 0 /100 WBCS
PLATELET # BLD AUTO: 352 THOUSANDS/UL (ref 149–390)
PMV BLD AUTO: 9.6 FL (ref 8.9–12.7)
POTASSIUM SERPL-SCNC: 4.2 MMOL/L (ref 3.5–5.3)
RBC # BLD AUTO: 4.07 MILLION/UL (ref 3.88–5.62)
SODIUM SERPL-SCNC: 132 MMOL/L (ref 135–147)
WBC # BLD AUTO: 14.23 THOUSAND/UL (ref 4.31–10.16)

## 2023-05-15 PROCEDURE — 80048 BASIC METABOLIC PNL TOTAL CA: CPT | Performed by: NURSE PRACTITIONER

## 2023-05-15 PROCEDURE — 85025 COMPLETE CBC W/AUTO DIFF WBC: CPT | Performed by: NURSE PRACTITIONER

## 2023-05-15 PROCEDURE — 97530 THERAPEUTIC ACTIVITIES: CPT

## 2023-05-15 PROCEDURE — 99232 SBSQ HOSP IP/OBS MODERATE 35: CPT

## 2023-05-15 PROCEDURE — 97112 NEUROMUSCULAR REEDUCATION: CPT

## 2023-05-15 PROCEDURE — 99232 SBSQ HOSP IP/OBS MODERATE 35: CPT | Performed by: NURSE PRACTITIONER

## 2023-05-15 PROCEDURE — 92507 TX SP LANG VOICE COMM INDIV: CPT

## 2023-05-15 RX ORDER — SODIUM CHLORIDE 9 MG/ML
75 INJECTION, SOLUTION INTRAVENOUS ONCE
Status: COMPLETED | OUTPATIENT
Start: 2023-05-15 | End: 2023-05-16

## 2023-05-15 RX ORDER — LOSARTAN POTASSIUM 50 MG/1
50 TABLET ORAL DAILY
Status: DISCONTINUED | OUTPATIENT
Start: 2023-05-16 | End: 2023-05-22

## 2023-05-15 RX ADMIN — ACETAMINOPHEN 975 MG: 325 TABLET ORAL at 21:15

## 2023-05-15 RX ADMIN — CITALOPRAM HYDROBROMIDE 20 MG: 20 TABLET ORAL at 10:28

## 2023-05-15 RX ADMIN — POLYETHYLENE GLYCOL 3350 17 G: 17 POWDER, FOR SOLUTION ORAL at 10:29

## 2023-05-15 RX ADMIN — ACETAMINOPHEN 975 MG: 325 TABLET ORAL at 15:29

## 2023-05-15 RX ADMIN — HYDROCHLOROTHIAZIDE 12.5 MG: 12.5 TABLET ORAL at 10:28

## 2023-05-15 RX ADMIN — DONEPEZIL HYDROCHLORIDE 5 MG: 5 TABLET ORAL at 17:45

## 2023-05-15 RX ADMIN — PANTOPRAZOLE SODIUM 20 MG: 20 TABLET, DELAYED RELEASE ORAL at 05:43

## 2023-05-15 RX ADMIN — SENNOSIDES, DOCUSATE SODIUM 2 TABLET: 8.6; 5 TABLET ORAL at 17:45

## 2023-05-15 RX ADMIN — HEPARIN SODIUM 5000 UNITS: 5000 INJECTION INTRAVENOUS; SUBCUTANEOUS at 15:29

## 2023-05-15 RX ADMIN — SODIUM CHLORIDE 75 ML/HR: 0.9 INJECTION, SOLUTION INTRAVENOUS at 15:54

## 2023-05-15 RX ADMIN — ASPIRIN 81 MG: 81 TABLET, COATED ORAL at 10:28

## 2023-05-15 RX ADMIN — DONEPEZIL HYDROCHLORIDE 5 MG: 5 TABLET ORAL at 10:28

## 2023-05-15 RX ADMIN — HEPARIN SODIUM 5000 UNITS: 5000 INJECTION INTRAVENOUS; SUBCUTANEOUS at 05:43

## 2023-05-15 RX ADMIN — SENNOSIDES, DOCUSATE SODIUM 2 TABLET: 8.6; 5 TABLET ORAL at 10:28

## 2023-05-15 RX ADMIN — TICAGRELOR 90 MG: 90 TABLET ORAL at 10:28

## 2023-05-15 RX ADMIN — TICAGRELOR 90 MG: 90 TABLET ORAL at 21:14

## 2023-05-15 RX ADMIN — ATORVASTATIN CALCIUM 40 MG: 40 TABLET, FILM COATED ORAL at 10:28

## 2023-05-15 RX ADMIN — LIDOCAINE 5% 1 PATCH: 700 PATCH TOPICAL at 10:29

## 2023-05-15 RX ADMIN — METOPROLOL SUCCINATE 100 MG: 100 TABLET, EXTENDED RELEASE ORAL at 10:28

## 2023-05-15 RX ADMIN — TAMSULOSIN HYDROCHLORIDE 0.4 MG: 0.4 CAPSULE ORAL at 17:45

## 2023-05-15 RX ADMIN — HEPARIN SODIUM 5000 UNITS: 5000 INJECTION INTRAVENOUS; SUBCUTANEOUS at 21:16

## 2023-05-15 RX ADMIN — ACETAMINOPHEN 975 MG: 325 TABLET ORAL at 05:43

## 2023-05-15 RX ADMIN — LOSARTAN POTASSIUM 50 MG: 50 TABLET, FILM COATED ORAL at 10:28

## 2023-05-15 NOTE — PROGRESS NOTES
"   05/15/23 1400   Pain Assessment   Pain Assessment Tool 0-10   Pain Score No Pain   Restrictions/Precautions   Precautions Aphasia; Aspiration;Bed/chair alarms;Cognitive; Fall Risk; Cortez; Supervision on toilet/commode   Weight Bearing Restrictions No   Cognition   Overall Cognitive Status Impaired   Arousal/Participation Cooperative   Attention Difficulty attending to directions   Memory Decreased short term memory;Decreased recall of recent events;Decreased recall of precautions   Following Commands Follows multistep commands with increased time or repetition   Subjective   Subjective Patient reports he is still fatigued from session this AM    Sit to Lying   Type of Assistance Needed Physical assistance   Physical Assistance Level Total assistance   Comment Assist of two with second person for SBA/safety   Sit to Lying CARE Score 1   Lying to Sitting on Side of Bed   Type of Assistance Needed Physical assistance   Physical Assistance Level Total assistance   Comment Assist of two with second person for SBA/safety   Lying to Sitting on Side of Bed CARE Score 1   Transfer Bed/Chair/Wheelchair   Findings Prior to patient getting back into bed, practiced scooting towards the head of the bed  Required Mod/MaxA x2 for scooting  Required hand over hand assistance for placement of RUE  Therapeutic Interventions   Other 15 Minutes Sitting EOB with 4\" inch block under patient's feet  While seated EOB, focused on having patient reach outside base of support to work on core strengthening and unsupported sitting balance  Patient required Min/ModA for trunk support  When reaching across body to the L with his R hand he often with try to support himself with LUE as he felt he was losing balance  After sitting EOB for some time, patient fatigued and was placed back to supine in bed  To continue to work on core strengthening, had patient reach cross body for cones to target obliques     Assessment   Treatment Assessment Patient " participated in 30 minute  cotreat session with occuptional and physical therapy  Occupational therapy focused on vision while PT focused on core strengthening, sitting EOB balance with reaching tasks, and trunk control  He required Min/ModA for trunk support this session  Please see visit details above  Patient would benefit from continued PT services to work on bed mobility, transfers, core strengthening, sitting balance, standing tolerance, progress to gait training when safe and appropriate, WC mobility  Problem List Decreased strength;Decreased endurance; Impaired balance;Decreased mobility; Decreased coordination;Decreased cognition;Decreased range of motion; Impaired judgement;Decreased safety awareness; Impaired vision; Impaired sensation; Impaired tone   Barriers to Discharge Decreased caregiver support; Inaccessible home environment   PT Barriers   Functional Limitation Car transfers; Ramp negotiation;Stair negotiation;Standing;Transfers; Walking; Wheelchair management   Plan   Treatment/Interventions ADL retraining;Functional transfer training;LE strengthening/ROM; Therapeutic exercise;Equipment eval/education;Patient/family training;Bed mobility;Gait training   Progress Slow progress, decreased activity tolerance   Recommendation   PT Discharge Recommendation Home with home health rehabilitation   PT Therapy Minutes   PT Time In 1400   PT Time Out 1430   PT Total Time (minutes) 30   PT Mode of treatment - Individual (minutes) 0   PT Mode of treatment - Concurrent (minutes) 0   PT Mode of treatment - Group (minutes) 0   PT Mode of treatment - Co-treat (minutes) 30     PT Mode of Treatment - Total time(minutes) 30 minutes   PT Cumulative Minutes 280   Therapy Time missed   Time missed?  No   Vladimir Cancer, PT, DPT

## 2023-05-15 NOTE — ASSESSMENT & PLAN NOTE
AT3 89% on 4/2023 lab per prior providers; can be low from recent stroke and not true AT3 def  - Recommend follow-up with neurology and repeat thrombosis panel as an outpatient

## 2023-05-15 NOTE — PROGRESS NOTES
05/15/23 0830   Pain Assessment   Pain Assessment Tool 0-10   Pain Score No Pain   Restrictions/Precautions   Precautions Aphasia; Aspiration;Bed/chair alarms;Cognitive; Fall Risk; Cortez; Supervision on toilet/commode  (R side inattention)   Weight Bearing Restrictions No   Cognition   Overall Cognitive Status Impaired   Arousal/Participation Cooperative   Attention Difficulty attending to directions   Memory Decreased short term memory;Decreased recall of precautions   Following Commands Follows multistep commands with increased time or repetition   Subjective   Subjective Patient reports he is doing okay  Bed-Chair Transfer   Type of Assistance Needed Physical assistance   Physical Assistance Level Total assistance   Comment Bed>Tilt in Space WC via use of angela lift x3 people  Required assistance of 2 people to properly position patient in tilt in space WC as patient was not in an optimal position in sling/not sitting upright enough and 3rd person to manage the [facility] lift  Tilt in Space WC<>EOB  Luck Loops for top of sling, blue loops for bottom of sling  Ensure the angela sling is well under the patient's bottom as it helds keep him in more upright position for better positioning in the tilt in space WC  (Did not practice slide board transfer as it is every fatiguing for the patient and wanted to focus on getting patient OOB and sitting EOB )   Chair/Bed-to-Chair Transfer CARE Score 1   Walk 10 Feet   Reason if not Attempted   (not focus of current session)   Walk 50 Feet with Two Turns   Reason if not Attempted Safety concerns   Walk 50 Feet with Two Turns CARE Score 88   Walk 150 Feet   Reason if not Attempted Safety concerns   Walk 150 Feet CARE Score 88   Walking 10 Feet on Uneven Surfaces   Reason if not Attempted Safety concerns   Walking 10 Feet on Uneven Surfaces CARE Score 88   Therapeutic Interventions   Other 30 Minutes Sitting EOB with 4 inch block under patient's feet with Assist of 2 people  One person positioned behind patient and second person positioned in front of patient  Patient required Mod-MaxA for trunk support  While seated EOB worked on lateral weight shifting to remove angela sling  Poor motor planning and poor awareness of RUE, at times reaching for therapist, when shifting weight to the Right  Hand of Hand assistance to keep RUE on mat  Next practiced, lateral transfers down the mat table to both left and the right  Required Mod/MaxA x2 people to perform, hand over hand assistance to keep RUE/hand on mat table, VC for foor placement and for anterior lean  When sitting EOB, R external rotation  Increased tone noted when attempting to bring hip into a neutral positon  Other Comments   Comments Concern for potential skin breakdown on the lateral aspect of R knee since patient demonstrates ER tightness resulting in the R knee resting against the leg rest  Placed two folded towels to act as a cushion  Assessment   Treatment Assessment Patient participated in 60 minute skilled phyiscal therapy session focusing on bed mobility, pre transfer training and sitting EOB  Session did not focus on slide board transfers as he fatigues quickly and goal of session was to focus on sitting EOB  Patient tolerated sitting EOB for 30 minutes with Mod/MaxA for trunk support  He demonstrates significant R inattention/poor body awareness and required hand over hand assistance for placement of RUE  May benefit from short sessions the AM focusing on transfer training with slide board followed by a break/rest period  As he becomes more fatigued, use of a angela may be needed  At end of session, he was left in tilt in Count includes the [facility] for Speech therapy to assess OOB tolerance so OOB schedule can be made for patient  Problem List Decreased strength;Decreased range of motion;Decreased endurance; Impaired balance;Decreased mobility; Decreased cognition;Decreased safety awareness; Impaired tone; Impaired sensation; Impaired vision; Impaired judgement;Decreased coordination   Barriers to Discharge Inaccessible home environment;Decreased caregiver support   PT Barriers   Physical Impairment Decreased strength;Decreased range of motion;Decreased endurance; Impaired balance;Decreased mobility; Decreased coordination;Decreased cognition; Impaired judgement; Impaired vision; Impaired tone; Impaired sensation;Decreased safety awareness   Functional Limitation Car transfers; Ramp negotiation;Stair negotiation;Standing;Transfers; Walking; Wheelchair management   Plan   Treatment/Interventions Functional transfer training;LE strengthening/ROM; Therapeutic exercise; Endurance training;Patient/family training;Bed mobility;Gait training   Progress Slow progress, decreased activity tolerance   Recommendation   PT Discharge Recommendation Home with home health rehabilitation   PT Therapy Minutes   PT Time In 0830   PT Time Out 0930   PT Total Time (minutes) 60   PT Mode of treatment - Individual (minutes) 60   PT Mode of treatment - Concurrent (minutes) 0   PT Mode of treatment - Group (minutes) 0   PT Mode of treatment - Co-treat (minutes) 0   PT Mode of Treatment - Total time(minutes) 60 minutes   PT Cumulative Minutes 235   Therapy Time missed   Time missed?  No       Araceli Carbajal, PT, DPT

## 2023-05-15 NOTE — PROGRESS NOTES
Pastoral Care Progress Note    5/15/2023  Patient: Yue Kirby : 1960  Admission Date & Time: 2023 1435  MRN: [id number] CSN: [id number]         05/15/23 0800   Clinical Encounter Type   Visited With Patient   Routine Visit Follow-up     Fr Moiz Morejon followed-up with the pt and conducted a pastoral visit  No further needs were expressed at this time  Chaplains still remain available

## 2023-05-15 NOTE — PROGRESS NOTES
Internal Medicine Progress Note  Patient: Tay Quevedo  Age/sex: 58 y o  male  Medical Record #: [id number]      ASSESSMENT/PLAN: (Interval History)  Tay Quevedo is seen and examined and management for following issues:    Acute multifocal ischemic strokes  • Occurred in different arterial distributions  • IVY was negative for thrombus/PFO  • Felt not to be vasculitis and negative by a-gram 5/4/23  • Continue ASA/Brilinta  • Continue statin     Left ICA stenosis  • Was noted to have all of the CVAs in last month have been in left anterior circulation  • S/p PTA/stent 5/4/23  • Recheck carotid doppler 6 weeks and see NS  • Continue AP/statin     Right ICA stenosis  • To followup with Vasc surgery as OP  • Continue AP/statin     LOOP recorder  • Was placed 3/2019  • Neuro wants it replaced = for OP to Cardiology     Leukocytosis  • Had no fevers  • CXR and Procal were normal  • Stable      HTN  • Home:  Toprol 100mg qd/Norvasc 10mg qd/Losartan 50mg qd/HCTZ 25mg qd/Hydralazine 25mg TID  • Here:  Toprol 100mg qd/Losartan 50mg qd/HCTZ 12 5mg qd  • HCTZ was decreased to 12 5mg qd today before transfer here to [facility]; stopped  starting 5/16/23     Pre DM  • HbA1C 5 8  • Takes Metformin at home but currently not on it in hospital  • Wife didn't want him to have Accuchecks noting that he was pre-DM so they were stopped in hospital   The BSs had been very normal in acute anyway  • Will watch fastings with BMPs  • For DM diet which he had not been on in acute  • Dr Ulyess Lundborg spoke with him about that and he was ok with that plan     Depression  • Continue celexa as at home     Memory loss  • Continue Aricept as at home  • He has had memory issues since his CVA in 2019     Urine retention  • Has jo  • VT per PMR  • Continue Flomax     Chronic LBP  • MRI showed advanced multilevel spondylosis, slightly progressed on the right at L2-3 compared to the prior study but otherwise stable     • Pain management per PMR     AAA  • Found on L-spine MRI  • Measures 3 4 cm  • OP followup     Hyponatremia  • Na 132  • Will stop HCTZ      Abnormal creatinine  · Stop HCTZ  · Will give 1 liter NSS x 1  · BMP AM and hold Cozaar until see labs tomorrow 5/16/23      Discharge date:  Team      The above assessment and plan was reviewed and updated as determined by my evaluation of the patient on 5/15/2023      Labs:   Results from last 7 days   Lab Units 05/15/23  0642 05/13/23  0559   WBC Thousand/uL 14 23* 12 84*   HEMOGLOBIN g/dL 13 3 12 8   HEMATOCRIT % 37 9 36 5   PLATELETS Thousands/uL 352 311     Results from last 7 days   Lab Units 05/15/23  0642 05/13/23  0559   SODIUM mmol/L 132* 134*   POTASSIUM mmol/L 4 2 3 5   CHLORIDE mmol/L 104 104   CO2 mmol/L 28 26   BUN mg/dL 22 27*   CREATININE mg/dL 1 29 1 22   CALCIUM mg/dL 9 5 9 0             Results from last 7 days   Lab Units 05/08/23  1642   POC GLUCOSE mg/dl 112       Review of Scheduled Meds:  Current Facility-Administered Medications   Medication Dose Route Frequency Provider Last Rate   • acetaminophen  975 mg Oral Q8H [address] Brickhossein Roach, DO     • aspirin  81 mg Oral Daily Shade Roach, DO     • atorvastatin  40 mg Oral Daily Shade Roach, DO     • bisacodyl  10 mg Rectal Daily PRN Shade Roach, DO     • calcium carbonate  1,000 mg Oral Daily PRN Shade Roach, DO     • citalopram  20 mg Oral Daily Shade Roach, DO     • donepezil  5 mg Oral BID Shade Roach, DO     • heparin (porcine)  5,000 Units Subcutaneous Q8H [address] Brickhossein Chaidezwin, DO     • hydrochlorothiazide  12 5 mg Oral Daily Shade Roach, DO     • lidocaine  1 patch Topical Daily Shade Roach, DO     • losartan  50 mg Oral Daily Shade Roach, DO     • methocarbamol  500 mg Oral Q6H PRN Shade Roach, DO     • metoprolol succinate  100 mg Oral Daily Shade Roach, DO     • ondansetron  4 mg Oral Q6H PRN Shade Roach, DO     • oxyCODONE  5 mg Oral Q6H PRN Shade Roach, DO     • oxyCODONE  2 5 mg Oral Q6H PRN Abraham Almanzar EDUARDO Castro, DO     • pantoprazole  20 mg Oral Early Morning Ne Duarte DO     • polyethylene glycol  17 g Oral QAM Ne Duarte, DO     • senna  2 tablet Oral Daily PRN Ne Duarte DO     • senna-docusate sodium  2 tablet Oral BID Ne Duarte DO     • tamsulosin  0 4 mg Oral Daily With Dinner Ne Duarte DO     • ticagrelor  90 mg Oral Q12H [address] Ne Duarte,          Subjective/ HPI: Patient seen and examined  Patients overnight issues or events were reviewed with nursing or staff during rounds or morning huddle session  New or overnight issues include the following:     No new or overnight issues  Offers no complaints except he is tired today  ROS:   A 10 point ROS was performed; negative except as noted above  Imaging:     No orders to display       *Labs /Radiology studies reviewed  *Medications reviewed and reconciled as needed  *Please refer to order section for additional ordered labs studies  *Case discussed with primary attending during morning huddle case rounds    Physical Examination:  Vitals:   Vitals:    05/14/23 0831 05/14/23 2040 05/15/23 0540 05/15/23 1028   BP: 121/62 156/66 139/63 138/70   BP Location: Left arm Right arm Right arm    Pulse: 65 70 75 70   Resp:  18 18    Temp:  98 2 °F (36 8 °C) 97 8 °F (36 6 °C)    TempSrc:  Oral Oral    SpO2:  94% 94%    Weight:           General Appearance: no distress, conversive  HEENT: PERRLA, conjuctiva normal; oropharynx clear; mucous membranes moist   Neck:  Supple, normal ROM  Lungs: CTA, normal respiratory effort, no retractions, expiratory effort normal  CV: regular rate and rhythm; no rubs/murmurs/gallops, PMI normal   ABD: soft; ND/NT; +BS  EXT: +LE edema  Skin: normal turgor, normal texture, no rashes  Psych: affect normal, mood normal  Neuro: AA; +expressive>receptive aphasia      The above physical exam was reviewed and updated as determined by my evaluation of the patient on 5/15/2023      Invasive Devices     Drain Duration           Urethral Catheter Latex 16 Fr  6 days                   VTE Pharmacologic Prophylaxis: Heparin  Code Status: Level 1 - Full Code  Current Length of Stay: 3 day(s)      Total time spent:  30 minutes with more than 50% spent counseling/coordinating care  Counseling includes discussion with patient re: progress  and discussion with patient of his/her current medical state/information  Coordination of patient's care was performed in conjunction with primary service  Time invested included review of patient's labs, vitals, and management of their comorbidities with continued monitoring  In addition, this patient was discussed with medical team including physician and advanced extenders  The care of the patient was extensively discussed and appropriate treatment plan was formulated unique for this patient  Medical decision making for the day was made by supervising physician unless otherwise noted in their attestation statement  ** Please Note:  voice to text software may have been used in the creation of this document   Although proof errors in transcription or interpretation are a potential of such software**

## 2023-05-15 NOTE — CASE MANAGEMENT
Cm attempted to meet with pt twice this afternoon, both times pt sleeping  Cm will phone wife to make contact and provide info  Pt is known to staff from previous admission and per chart laureano resides in a multi level home with a first flr set up  Pt has a ramp to enter  Cm to confirm if spouse works from home and if full time  Pt owns a commode and has outpt pt ot and speech at [facility] location  It is stated that pt uses [facility] in [place] for rx needs  Per pre admission screen pts primary insurance is [employer] with blue cross secondary  In epic it is listed the opposite  Per inpat verification dept blue cross states they are primary  Cm is awaiting process for authorization if blue cross is primary

## 2023-05-15 NOTE — PROGRESS NOTES
"OT Daily Treatment Note       05/15/23 1030   Pain Assessment   Pain Assessment Tool 0-10   Pain Score No Pain   Restrictions/Precautions   Precautions Aphasia; Aspiration;Bed/chair alarms;Cognitive; Fall Risk; [place]; Supervision on toilet/commode;Visual deficit   Lifestyle   Autonomy \"I am tired\"   Roll Left and Right   Type of Assistance Needed Physical assistance   Physical Assistance Level 51%-75%   Comment mod/max A x 1 with use of bedrail   Roll Left and Right CARE Score 2   Bed-Chair Transfer   Type of Assistance Needed Physical assistance   Physical Assistance Level Total assistance   Comment A x 3 angela from WC > bed  2 people to place angela sling while pt seated in tilt-in space WC, 3rd person to manage equipment  Chair/Bed-to-Chair Transfer CARE Score 1   Cognition   Overall Cognitive Status Impaired   Arousal/Participation Cooperative   Attention Difficulty attending to directions   Orientation Level Oriented to person;Oriented to place;Oriented to time   Memory Decreased short term memory;Decreased recall of precautions   Following Commands Follows multistep commands with increased time or repetition   Activity Tolerance   Activity Tolerance Patient limited by fatigue   Assessment   Treatment Assessment Pt participated in brief skilled PT/OT session with OT focusing on command following and visual attention while PT focused on transfer training and bed mobility  See flowsheet for details  Pt would benefit from cont co-tx session due to medical complexity req 2 skilled therapists to inc indepdendence and safety with ADLs and mobility  Prognosis Fair   Problem List Decreased strength;Decreased range of motion;Decreased endurance; Impaired balance;Decreased mobility; Decreased cognition;Decreased safety awareness; Impaired tone; Impaired sensation; Impaired vision; Impaired judgement;Decreased coordination   Barriers to Discharge Inaccessible home environment;Decreased caregiver support   Plan " Treatment/Interventions ADL retraining;Functional transfer training; Therapeutic exercise; Endurance training;Cognitive reorientation;Patient/family training; Compensatory technique education   Progress Slow progress, decreased activity tolerance   OT Therapy Minutes   OT Time In 1030   OT Time Out 1045   OT Total Time (minutes) 15   OT Mode of treatment - Individual (minutes) 0   OT Mode of treatment - Concurrent (minutes) 0   OT Mode of treatment - Group (minutes) 0   OT Mode of treatment - Co-treat (minutes) 15   OT Mode of Treatment - Total time(minutes) 15 minutes   OT Cumulative Minutes 145   Therapy Time missed   Time missed?  No

## 2023-05-15 NOTE — PLAN OF CARE
Problem: PAIN - ADULT  Goal: Verbalizes/displays adequate comfort level or baseline comfort level  Description: Interventions:  - Encourage patient to monitor pain and request assistance  - Assess pain using appropriate pain scale  - Administer analgesics based on type and severity of pain and evaluate response  - Implement non-pharmacological measures as appropriate and evaluate response  - Consider cultural and social influences on pain and pain management  - Notify physician/advanced practitioner if interventions unsuccessful or patient reports new pain  Outcome: Progressing     Problem: INFECTION - ADULT  Goal: Absence or prevention of progression during hospitalization  Description: INTERVENTIONS:  - Assess and monitor for signs and symptoms of infection  - Monitor lab/diagnostic results  - Monitor all insertion sites, i e  indwelling lines, tubes, and drains  - Monitor endotracheal if appropriate and nasal secretions for changes in amount and color  - [place] appropriate cooling/warming therapies per order  - Administer medications as ordered  - Instruct and encourage patient and family to use good hand hygiene technique  - Identify and instruct in appropriate isolation precautions for identified infection/condition  Outcome: Progressing  Goal: Absence of fever/infection during neutropenic period  Description: INTERVENTIONS:  - Monitor WBC    Outcome: Progressing     Problem: SAFETY ADULT  Goal: Patient will remain free of falls  Description: INTERVENTIONS:  - Educate patient/family on patient safety including physical limitations  - Instruct patient to call for assistance with activity   - Consult OT/PT to assist with strengthening/mobility   - Keep Call bell within reach  - Keep bed low and locked with side rails adjusted as appropriate  - Keep care items and personal belongings within reach  - Initiate and maintain comfort rounds  - Make Fall Risk Sign visible to staff  - Offer Toileting every 2 Hours, in advance of need  - Initiate/Maintain bed/chair alarm  - Obtain necessary fall risk management equipment: non skid footwear  - Apply yellow socks and bracelet for high fall risk patients  - Consider moving patient to room near nurses station  Outcome: Progressing  Goal: Maintain or return to baseline ADL function  Description: INTERVENTIONS:  -  Assess patient's ability to carry out ADLs; assess patient's baseline for ADL function and identify physical deficits which impact ability to perform ADLs (bathing, care of mouth/teeth, toileting, grooming, dressing, etc )  - Assess/evaluate cause of self-care deficits   - Assess range of motion  - Assess patient's mobility; develop plan if impaired  - Assess patient's need for assistive devices and provide as appropriate  - Encourage maximum independence but intervene and supervise when necessary  - Involve family in performance of ADLs  - Assess for home care needs following discharge   - Consider OT consult to assist with ADL evaluation and planning for discharge  - Provide patient education as appropriate  Outcome: Progressing  Goal: Maintains/Returns to pre admission functional level  Description: INTERVENTIONS:  - Perform BMAT or MOVE assessment daily    - Set and communicate daily mobility goal to care team and patient/family/caregiver  - Collaborate with rehabilitation services on mobility goals if consulted  - Perform Range of Motion 3 times a day  - Reposition patient every 2 hours    - Dangle patient 3 times a day  - Stand patient 3 times a day  - Ambulate patient 3 times a day  - Out of bed to chair 3 times a day   - Out of bed for meals 3 times a day  - Out of bed for toileting  - Record patient progress and toleration of activity level   Outcome: Progressing     Problem: DISCHARGE PLANNING  Goal: Discharge to home or other facility with appropriate resources  Description: INTERVENTIONS:  - Identify barriers to discharge w/patient and caregiver  - Arrange for needed discharge resources and transportation as appropriate  - Identify discharge learning needs (meds, wound care, etc )  - Arrange for interpretive services to assist at discharge as needed  - Refer to Case Management Department for coordinating discharge planning if the patient needs post-hospital services based on physician/advanced practitioner order or complex needs related to functional status, cognitive ability, or social support system  Outcome: Progressing     Problem: Prexisting or High Potential for Compromised Skin Integrity  Goal: Skin integrity is maintained or improved  Description: INTERVENTIONS:  - Identify patients at risk for skin breakdown  - Assess and monitor skin integrity  - Assess and monitor nutrition and hydration status  - Monitor labs   - Assess for incontinence   - Turn and reposition patient  - Assist with mobility/ambulation  - Relieve pressure over bony prominences  - Avoid friction and shearing  - Provide appropriate hygiene as needed including keeping skin clean and dry  - Evaluate need for skin moisturizer/barrier cream  - Collaborate with interdisciplinary team   - Patient/family teaching  - Consider wound care consult   Outcome: Progressing     Problem: MOBILITY - ADULT  Goal: Maintain or return to baseline ADL function  Description: INTERVENTIONS:  -  Assess patient's ability to carry out ADLs; assess patient's baseline for ADL function and identify physical deficits which impact ability to perform ADLs (bathing, care of mouth/teeth, toileting, grooming, dressing, etc )  - Assess/evaluate cause of self-care deficits   - Assess range of motion  - Assess patient's mobility; develop plan if impaired  - Assess patient's need for assistive devices and provide as appropriate  - Encourage maximum independence but intervene and supervise when necessary  - Involve family in performance of ADLs  - Assess for home care needs following discharge   - Consider OT consult to assist with ADL evaluation and planning for discharge  - Provide patient education as appropriate  Outcome: Progressing  Goal: Maintains/Returns to pre admission functional level  Description: INTERVENTIONS:  - Perform BMAT or MOVE assessment daily    - Set and communicate daily mobility goal to care team and patient/family/caregiver  - Collaborate with rehabilitation services on mobility goals if consulted  - Perform Range of Motion 3 times a day  - Reposition patient every 2 hours    - Dangle patient 3 times a day  - Stand patient 3 times a day  - Ambulate patient 3 times a day  - Out of bed to chair 3 times a day   - Out of bed for meals 3 times a day  - Out of bed for toileting  - Record patient progress and toleration of activity level   Outcome: Progressing

## 2023-05-15 NOTE — ED ATTENDING ATTESTATION
4/26/2023  I, Kate Quevedo MD, saw and evaluated the patient  I have discussed the patient with the resident/non-physician practitioner and agree with the resident's/non-physician practitioner's findings, Plan of Care, and MDM as documented in the resident's/non-physician practitioner's note, except where noted  All available labs and Radiology studies were reviewed  I was present for key portions of any procedure(s) performed by the resident/non-physician practitioner and I was immediately available to provide assistance  At this point I agree with the current assessment done in the Emergency Department  I have conducted an independent evaluation of this patient a history and physical is as follows:    ED Course     Patient presents for evaluation after an unresponsive episode at home  Patient reportedly had new left facial droop and difficulty word finding  Patient was recently admitted due to symptoms into a chronic right MCA infarct  Patient does reportedly have some left-sided weakness at baseline but the left facial droop was noted  Per EMS, they noted some word finding difficulties in route but states that this is also improved prior to arriving in the emergency department  Exam: Awake, alert, confused, no focal motor/sensory deficits  A/P: Stroke like symptoms  Stroke alert called  Patient accompanied to CT scanner  Patient evaluated concurrently with neurology  Labs and CT/CTA ordered  Given improvement of symptoms as well as chronic infarct, patient not a TNK candidate at this time  Will complete work-up and plan to admit  Critical Care Time  CriticalCare Time    Date/Time: 4/26/2023 3:35 PM  Performed by: Kate Quevedo MD  Authorized by:  Kate Quevedo MD     Critical care provider statement:     Critical care time (minutes):  32    Critical care time was exclusive of:  Separately billable procedures and treating other patients and teaching time    Critical care was necessary to treat or prevent imminent or life-threatening deterioration of the following conditions:  CNS failure or compromise    Critical care was time spent personally by me on the following activities:  Obtaining history from patient or surrogate, development of treatment plan with patient or surrogate, discussions with consultants, evaluation of patient's response to treatment, examination of patient, ordering and performing treatments and interventions, ordering and review of laboratory studies, ordering and review of radiographic studies, review of old charts and re-evaluation of patient's condition

## 2023-05-15 NOTE — PROGRESS NOTES
"   05/15/23 0930   Pain Assessment   Pain Assessment Tool 0-10   Pain Score No Pain   Restrictions/Precautions   Precautions Aphasia; Aspiration;Bed/chair alarms;Cognitive; Fall Risk; Cortez; Supervision on toilet/commode;Visual deficit   Comprehension   Comprehension (FIM) 3 - Understands basic info/conversation 50-74% of time   Expression   Expression (FIM) 2 - Uses only simple expressions or gestures (waves, hello)   Social Interaction   Social Interaction (FIM) 5 - Interacts appropriately with others 90% of time   Problem Solving   Problem solving (FIM) 2 - Solves basic problems 25-49% of time   Memory   Memory (FIM) 2 - Recognizes, recalls/performs 25-49%   Language Assessments   Informal Speech Language Assessment Pt completed the following portions of the Informal Language Assessment   Results are as follows with the below sections addressed:      Orientation Information:    Person: +   Place: unable to spontaneously elicit but correct in answering yes/no questions   Time: given yes/no questions, pt was correct for month but incorrect for year   Situation: \"I had a stroke\"    LTM Biographical Information:              : when broken down each piece of info, pt correctly verbalized month, date but did ID year of birth when given Fo3 choices-unable to state year after this, despite direct verbal models              Age: inaccurate given Fo3 choices              Marital Status/Name of Significant Other: phrase completion cues paired with initiation phonemic cue              Children/Names: unable to provide names but did state 1 dtr and 1 son                  Auditory Comprehension Tasks:     Yes/No Questions:  Biographical Yes/No Questions: 10/10 accurate  Simple Yes/No Questions: 9/10 accurate  Moderate Yes/No Questions: 6/10 accurate     Verbal Command Followin-step: 4/5 accurate  2-step: 4/5 accurate  3-step:  0/5 accurate  Noted slower processing with initiation of incorrect movements to which pt at times " "self corrected  Presented with difficulty in motor planning as commands became more complex  Receptive Picture/Object Identification: 5/5 accurate from [facility] but observed to have R sided visual deficits-required verbal and visual cues to increase scanning to R side, but also noting imprecise pointing to items, presenting with further visual deficits-notified OT of observations from session      Verbal Expression Tasks:     Automatic Speech:   Counting: 10/10 accurate with verbal models to initiate due to comprehension  SUZETTE: 7/7 with verbal models of first two days  MARIA C: 10/12 accurate with verbal models of first two months  Melodic Intonation: \"Happy Birthday\" Song: ~60% accurate in unison with SLP    Responsive Naming: 3/10 accurate regarding correct words; 5/10 accurate with use of close paraphasias      Phrase Completion:   Opposites:  2/10 accurate-noted pt to begin to become highly fatigued to which most responses turned into \"I don't know\" or \"I just can't think\"  Simple Phrases:     Repetition: Able to repeat 4/10 words/phrases/sentences but varied in ability to repeat different lengths of sentences; noted to intermittently omit or add words to phrases/sentences     Speech/Language/Cognition Assessment   Treatment Assessment Pt participated in skilled SLP session focusing on expressive and receptive language skills, specifically continued evaluation of skills utilizing the Informal Speech/Language assessment Tool  Pt noted to fatigue around the 45 min [place]ley where he then presented with worsening verbal expression, increased word finding difficulties and limited verbal responses which were then only \"I don't know\" and \"I can't think  \" When a brief break was given and pt was returned to his room, SLP engaged pt in informal convo related to his work hx  Pt unable to provide specific info but general info only  Pt then expressed that his greatest frustration is with communication at this time   SLP reviewed " role of SLP and treatment plan-pt in full agreement and presented as highly motivated  Lastly, when SLP updated pt's information board in room, pt required moderate to max verbal, phonemic and phrase completion cues but was eventually able to state the month, SUZETTE and date  Overall, pt is presenting with moderate receptive language deficits and severe expressive language/communication deficits  Based on current evaluation, pt is recommended for further skilled SLP services during acute rehab stay in order to improve both expressive/receptive language skills and to maximize overall functional communication abilities  SLP Therapy Minutes   SLP Time In 0930   SLP Time Out 1030   SLP Total Time (minutes) 60   SLP Mode of treatment - Individual (minutes) 60   SLP Mode of treatment - Concurrent (minutes) 0   SLP Mode of treatment - Group (minutes) 0   SLP Mode of treatment - Co-treat (minutes) 0   SLP Mode of Treatment - Total time(minutes) 60 minutes   SLP Cumulative Minutes 150   Therapy Time missed   Time missed?  No

## 2023-05-15 NOTE — UTILIZATION REVIEW
NOTIFICATION OF ADMISSION DISCHARGE   This is a Notification of Discharge from [facility]  Please be advised that this patient has been discharge from our facility  Below you will find the admission and discharge date and time including the patient’s disposition  UTILIZATION REVIEW CONTACT:  Breanna Toledo  Utilization   Network Utilization Review Department  Phone: [phone] x carefully listen to the prompts  All voicemails are confidential   Email: [email or web address]     ADMISSION INFORMATION  PRESENTATION DATE: 4/26/2023  3:30 PM  OBERVATION ADMISSION DATE:   INPATIENT ADMISSION DATE: 4/26/23  4:28 PM   DISCHARGE DATE: 5/12/2023  2:24 PM   [facility]    IMPORTANT INFORMATION:  Send all requests for admission clinical reviews, approved or denied determinations and any other requests to dedicated fax number below belonging to the campus where the patient is receiving treatment   List of dedicated fax numbers:  [address] DENIALS (Administrative/Medical Necessity) [phone]   [address] (Maternity/NICU/Pediatrics) [phone]   [facility] [phone]   [facility] 87 [phone]   [address] [phone]   [facility] [phone]   90 [facility] [phone]   [address] [place], [place] [address] [phone]   [facility]  [phone]   [facility] [facility] [phone]   412 [facility] [address] [phone]

## 2023-05-15 NOTE — PLAN OF CARE
Problem: PAIN - ADULT  Goal: Verbalizes/displays adequate comfort level or baseline comfort level  Description: Interventions:  - Encourage patient to monitor pain and request assistance  - Assess pain using appropriate pain scale  - Administer analgesics based on type and severity of pain and evaluate response  - Implement non-pharmacological measures as appropriate and evaluate response  - Consider cultural and social influences on pain and pain management  - Notify physician/advanced practitioner if interventions unsuccessful or patient reports new pain  Outcome: Progressing     Problem: INFECTION - ADULT  Goal: Absence or prevention of progression during hospitalization  Description: INTERVENTIONS:  - Assess and monitor for signs and symptoms of infection  - Monitor lab/diagnostic results  - Monitor all insertion sites, i e  indwelling lines, tubes, and drains  - Monitor endotracheal if appropriate and nasal secretions for changes in amount and color  - [place] appropriate cooling/warming therapies per order  - Administer medications as ordered  - Instruct and encourage patient and family to use good hand hygiene technique  - Identify and instruct in appropriate isolation precautions for identified infection/condition  Outcome: Progressing  Goal: Absence of fever/infection during neutropenic period  Description: INTERVENTIONS:  - Monitor WBC    Outcome: Progressing     Problem: SAFETY ADULT  Goal: Patient will remain free of falls  Description: INTERVENTIONS:  - Educate patient/family on patient safety including physical limitations  - Instruct patient to call for assistance with activity   - Consult OT/PT to assist with strengthening/mobility   - Keep Call bell within reach  - Keep bed low and locked with side rails adjusted as appropriate  - Keep care items and personal belongings within reach  - Initiate and maintain comfort rounds  - Make Fall Risk Sign visible to staff  - Offer Toileting every 2 Hours, in advance of need  - Initiate/Maintain bed/chair alarm  - Obtain necessary fall risk management equipment: alarms  - Apply yellow socks and bracelet for high fall risk patients  - Consider moving patient to room near nurses station  Outcome: Progressing  Goal: Maintain or return to baseline ADL function  Description: INTERVENTIONS:  -  Assess patient's ability to carry out ADLs; assess patient's baseline for ADL function and identify physical deficits which impact ability to perform ADLs (bathing, care of mouth/teeth, toileting, grooming, dressing, etc )  - Assess/evaluate cause of self-care deficits   - Assess range of motion  - Assess patient's mobility; develop plan if impaired  - Assess patient's need for assistive devices and provide as appropriate  - Encourage maximum independence but intervene and supervise when necessary  - Involve family in performance of ADLs  - Assess for home care needs following discharge   - Consider OT consult to assist with ADL evaluation and planning for discharge  - Provide patient education as appropriate  Outcome: Progressing  Goal: Maintains/Returns to pre admission functional level  Description: INTERVENTIONS:  - Perform BMAT or MOVE assessment daily    - Set and communicate daily mobility goal to care team and patient/family/caregiver  - Collaborate with rehabilitation services on mobility goals if consulted  - Perform Range of Motion 3 times a day  - Reposition patient every 2 hours    - Dangle patient 3 times a day  - Stand patient 3 times a day  - Ambulate patient 3 times a day  - Out of bed to chair 3 times a day   - Out of bed for meals 3 times a day  - Out of bed for toileting  - Record patient progress and toleration of activity level   Outcome: Progressing     Problem: DISCHARGE PLANNING  Goal: Discharge to home or other facility with appropriate resources  Description: INTERVENTIONS:  - Identify barriers to discharge w/patient and caregiver  - Arrange for needed discharge resources and transportation as appropriate  - Identify discharge learning needs (meds, wound care, etc )  - Arrange for interpretive services to assist at discharge as needed  - Refer to Case Management Department for coordinating discharge planning if the patient needs post-hospital services based on physician/advanced practitioner order or complex needs related to functional status, cognitive ability, or social support system  Outcome: Progressing     Problem: Prexisting or High Potential for Compromised Skin Integrity  Goal: Skin integrity is maintained or improved  Description: INTERVENTIONS:  - Identify patients at risk for skin breakdown  - Assess and monitor skin integrity  - Assess and monitor nutrition and hydration status  - Monitor labs   - Assess for incontinence   - Turn and reposition patient  - Assist with mobility/ambulation  - Relieve pressure over bony prominences  - Avoid friction and shearing  - Provide appropriate hygiene as needed including keeping skin clean and dry  - Evaluate need for skin moisturizer/barrier cream  - Collaborate with interdisciplinary team   - Patient/family teaching  - Consider wound care consult   Outcome: Progressing     Problem: MOBILITY - ADULT  Goal: Maintain or return to baseline ADL function  Description: INTERVENTIONS:  -  Assess patient's ability to carry out ADLs; assess patient's baseline for ADL function and identify physical deficits which impact ability to perform ADLs (bathing, care of mouth/teeth, toileting, grooming, dressing, etc )  - Assess/evaluate cause of self-care deficits   - Assess range of motion  - Assess patient's mobility; develop plan if impaired  - Assess patient's need for assistive devices and provide as appropriate  - Encourage maximum independence but intervene and supervise when necessary  - Involve family in performance of ADLs  - Assess for home care needs following discharge   - Consider OT consult to assist with ADL evaluation and planning for discharge  - Provide patient education as appropriate  Outcome: Progressing  Goal: Maintains/Returns to pre admission functional level  Description: INTERVENTIONS:  - Perform BMAT or MOVE assessment daily    - Set and communicate daily mobility goal to care team and patient/family/caregiver  - Collaborate with rehabilitation services on mobility goals if consulted  - Perform Range of Motion 3 times a day  - Reposition patient every 2 hours    - Dangle patient 3 times a day  - Stand patient 3 times a day  - Ambulate patient 3 times a day  - Out of bed to chair 3 times a day   - Out of bed for meals 3 times a day  - Out of bed for toileting  - Record patient progress and toleration of activity level   Outcome: Progressing

## 2023-05-15 NOTE — PROGRESS NOTES
05/15/23 1031   Pain Assessment   Pain Assessment Tool 0-10   Pain Score No Pain   Restrictions/Precautions   Precautions Aphasia;Bed/chair alarms;Cognitive; Fall Risk;Aspiration; Cortez; Supervision on toilet/commode   Weight Bearing Restrictions No   Cognition   Overall Cognitive Status Impaired   Arousal/Participation Cooperative   Attention Difficulty attending to directions   Memory Decreased short term memory;Decreased recall of precautions;Decreased recall of recent events   Following Commands Follows multistep commands with increased time or repetition   Subjective   Subjective Patient reports he is very tired following PT and ST sessions this AM    Roll Left and Right   Type of Assistance Needed Physical assistance   Physical Assistance Level 51%-75%   Comment Mod/Max A with use of bed rail   Roll Left and Right CARE Score 2   Bed-Chair Transfer   Type of Assistance Needed Physical assistance   Physical Assistance Level Total assistance   Comment A x 3 with use of Ally lift  2 people assist with [facility] sling placement, 3rd person for equipment management  Two skilled clinicians needed for [facility] transfer as nursing staff has not yet been trained or cleared to transfer the patient  Chair/Bed-to-Chair Transfer CARE Score 1   Assessment   Treatment Assessment Patient participated in a brief 15 minute skilled physical therapy session focusing on transferring patient back into the bed  Patient reports high level of fatigue following being OOB for PT and ST sessions this AM Total time OOB 2 hours  Due to high levels of fatigue, transferred patient via use of the [facility] lift  Once in bed plan was to further cotreat with OT, but patient falling alseep as soon as he was in bed  Patient will likely only tolerate being OOB for 1 5 hours at most to to help OOB tolerance and set up OOB schedule  Next session, focusing on training nursing staff on transfer patient with [facility] lift   Patient would continue to benefit from skilled PT services to work on OOB tolerance, bed mobility, transfer training, sitting balance, R inattention, coordination training, WC management, and gait training as safe and appropriate  Problem List Decreased strength;Decreased range of motion;Decreased endurance; Impaired balance;Decreased mobility; Decreased cognition; Impaired judgement;Decreased safety awareness; Impaired tone; Impaired sensation   Barriers to Discharge Inaccessible home environment;Decreased caregiver support   PT Barriers   Physical Impairment Decreased strength;Decreased range of motion;Decreased endurance; Impaired balance;Decreased mobility; Decreased coordination;Decreased cognition; Impaired judgement;Decreased safety awareness; Impaired sensation; Impaired tone   Functional Limitation Car transfers; Ramp negotiation;Standing;Stair negotiation;Transfers; Walking   Plan   Treatment/Interventions Functional transfer training;LE strengthening/ROM; Therapeutic exercise; Endurance training;Patient/family training;Bed mobility;Gait training   Progress Slow progress, decreased activity tolerance   Recommendation   PT Discharge Recommendation Home with home health rehabilitation   PT Therapy Minutes   PT Time In 1031   PT Time Out 1046   PT Total Time (minutes) 15   PT Mode of treatment - Individual (minutes) 0   PT Mode of treatment - Concurrent (minutes) 0   PT Mode of treatment - Group (minutes) 0   PT Mode of treatment - Co-treat (minutes) 15     PT Mode of Treatment - Total time(minutes) 15 minutes   PT Cumulative Minutes 250   Therapy Time missed   Time missed?  No   Brenden Justin, PT, DPT

## 2023-05-15 NOTE — PROGRESS NOTES
"OT Daily Treatment Note       05/15/23 9890   Pain Assessment   Pain Assessment Tool 0-10   Pain Score No Pain   Restrictions/Precautions   Precautions Aphasia; Aspiration;Bed/chair alarms;Cognitive; Fall Risk; Cortez; Supervision on toilet/commode;Visual deficit   Lifestyle   Autonomy \"now you are pushing it\"   Roll Left and Right   Type of Assistance Needed Physical assistance   Physical Assistance Level 51%-75%   Comment Mod/Max A with bedrail   Roll Left and Right CARE Score 2   Sit to Lying   Type of Assistance Needed Physical assistance   Physical Assistance Level Total assistance   Comment pt able to complete sit > supine txfr with A x 1 as pt utilized momentum to elevate LE onto bed but 2nd person present for SBA for safety   Sit to Lying CARE Score 1   Lying to Sitting on Side of Bed   Type of Assistance Needed Physical assistance   Physical Assistance Level Total assistance   Comment pt able to complete supine>sit txfr with max A x 1 as pt able to bring LE to EOB only req assist for trunk control but 2nd person present for SBA for safety   Lying to Sitting on Side of Bed CARE Score 1   Neuromuscular Education   Trunk Control while seated at EOB, pt completed dynamic reaching activity with PT focusing on dynamic sitting balance while OT focused on visual scanning and one-two step command following  pt req mod - max vc to coordinate mvmt as pt demo inc motor planning deficits and would req tactile cues for hand/foot placement along with CIMT to force use of specific hand for activity   pt able to complete task with A x1 (see PT for details regarding assist level)   Functional Movement Patterns see above   Response to Techniques inc fatigue   Comments at start of session, pt engaged in colored bears activity focusing on dynamic supported sitting balance, fine motor control, motor planning, visual scanning, R attention, command following and expressive/receptive aphasia as pt needed to verbalize what color bear he " "was picking up  pt did present with increased cognitive fatige requiring multiple rest breaks as a \"reset\"  Pt was observed picking up wrong colored bears and having difficulty scanning far R to locate other bears  OT engaged pt in conversation regarding football to dec pts frustration with task which appeared to help as the pt would return to the activty with improved outcomes  continued activity with PT present seated at EOB but pt was too fatigued at this time and was having inc difficulty with task  then engaged pt in easier activity of reaching for cones (see above)   Coordination   Fine Motor see above   Cognition   Overall Cognitive Status Impaired   Arousal/Participation Cooperative   Attention Difficulty attending to directions   Orientation Level Oriented to person;Oriented to place;Oriented to time   Memory Decreased short term memory;Decreased recall of precautions;Decreased recall of recent events   Following Commands Follows multistep commands with increased time or repetition   Vision   Vision Comments completed visual assessment seated at EOB  With L eye covered, pt presents with possible R peripheral field cut as pt was able to express when he was able to see the colored popsicle which only became visible in central line of vision in R eye only  Pt able to track with R eye until popsicle was brought to R side of central vision  With R eye covered, pt showed normal tracking, periperhal and central vision  pt is definitely hindered by global aphasia but OT able to see more evidently that pts visual deficits are more than an inattention     Activity Tolerance   Activity Tolerance Patient limited by fatigue   Assessment   Treatment Assessment Pt participated in skilled OT session followed by skilled PT/OT co tx with PT focusing on sitting balance, core strengthening and trunk control while OT focused on visual perceptual abilities, expressive aphasia, fine motor control, coordination with motor planning " and command following  See flowsheet for details of session and current functional status  Pt is limited by weakness, decreased ROM, impaired balance, decreased endurance, decreased coordination, increased fall risk, decreased ADLS, decreased IADLS, pain, decreased activity tolerance, decreased safety awareness, impaired judgement, decreased cognition, decreased sensation and decreased strength and requires skilled OT services to increase independence and safety with ADL completion in prep for DC pending progress  Plan to continue ADL retraining, visual perceptual retraining, functional transfer training, UE strengthening/ROM, endurance training, cognitive reorientation, Pt/caregiver education, equipment evaluation/education, neuro re-ed, fine motor coordination activities, compensatory technique education, continued education, energy conservation and activity engagement  to address barriers mentioned above  Prognosis Fair   Problem List Decreased strength;Decreased range of motion;Decreased endurance; Impaired balance;Decreased mobility; Decreased cognition; Impaired judgement;Decreased safety awareness; Impaired tone; Impaired sensation   Barriers to Discharge Inaccessible home environment;Decreased caregiver support   Plan   Treatment/Interventions ADL retraining;Functional transfer training; Therapeutic exercise; Endurance training;Patient/family training; Compensatory technique education   Progress Slow progress, decreased activity tolerance   OT Therapy Minutes   OT Time In 1330   OT Time Out 1430   OT Total Time (minutes) 60   OT Mode of treatment - Individual (minutes) 30   OT Mode of treatment - Concurrent (minutes) 0   OT Mode of treatment - Group (minutes) 0   OT Mode of treatment - Co-treat (minutes) 30   OT Mode of Treatment - Total time(minutes) 60 minutes   OT Cumulative Minutes 205   Therapy Time missed   Time missed?  No

## 2023-05-16 LAB
ANION GAP SERPL CALCULATED.3IONS-SCNC: 2 MMOL/L (ref 4–13)
BUN SERPL-MCNC: 24 MG/DL (ref 5–25)
CALCIUM SERPL-MCNC: 9.1 MG/DL (ref 8.3–10.1)
CHLORIDE SERPL-SCNC: 106 MMOL/L (ref 96–108)
CO2 SERPL-SCNC: 25 MMOL/L (ref 21–32)
CREAT SERPL-MCNC: 1.03 MG/DL (ref 0.6–1.3)
GFR SERPL CREATININE-BSD FRML MDRD: 76 ML/MIN/1.73SQ M
GLUCOSE SERPL-MCNC: 98 MG/DL (ref 65–140)
POTASSIUM SERPL-SCNC: 3.9 MMOL/L (ref 3.5–5.3)
SODIUM SERPL-SCNC: 133 MMOL/L (ref 135–147)

## 2023-05-16 PROCEDURE — 99232 SBSQ HOSP IP/OBS MODERATE 35: CPT | Performed by: NURSE PRACTITIONER

## 2023-05-16 PROCEDURE — 92507 TX SP LANG VOICE COMM INDIV: CPT

## 2023-05-16 PROCEDURE — 99233 SBSQ HOSP IP/OBS HIGH 50: CPT

## 2023-05-16 PROCEDURE — 80048 BASIC METABOLIC PNL TOTAL CA: CPT | Performed by: NURSE PRACTITIONER

## 2023-05-16 PROCEDURE — 97535 SELF CARE MNGMENT TRAINING: CPT

## 2023-05-16 PROCEDURE — 97530 THERAPEUTIC ACTIVITIES: CPT

## 2023-05-16 PROCEDURE — 97112 NEUROMUSCULAR REEDUCATION: CPT

## 2023-05-16 RX ORDER — LIDOCAINE HYDROCHLORIDE 20 MG/ML
JELLY TOPICAL EVERY 4 HOURS PRN
Status: DISCONTINUED | OUTPATIENT
Start: 2023-05-17 | End: 2023-06-06 | Stop reason: HOSPADM

## 2023-05-16 RX ADMIN — LOSARTAN POTASSIUM 50 MG: 50 TABLET, FILM COATED ORAL at 13:32

## 2023-05-16 RX ADMIN — HEPARIN SODIUM 5000 UNITS: 5000 INJECTION INTRAVENOUS; SUBCUTANEOUS at 21:09

## 2023-05-16 RX ADMIN — PANTOPRAZOLE SODIUM 20 MG: 20 TABLET, DELAYED RELEASE ORAL at 05:24

## 2023-05-16 RX ADMIN — TICAGRELOR 90 MG: 90 TABLET ORAL at 08:47

## 2023-05-16 RX ADMIN — DONEPEZIL HYDROCHLORIDE 5 MG: 5 TABLET ORAL at 08:45

## 2023-05-16 RX ADMIN — TICAGRELOR 90 MG: 90 TABLET ORAL at 21:09

## 2023-05-16 RX ADMIN — CITALOPRAM HYDROBROMIDE 20 MG: 20 TABLET ORAL at 08:46

## 2023-05-16 RX ADMIN — ACETAMINOPHEN 975 MG: 325 TABLET ORAL at 13:33

## 2023-05-16 RX ADMIN — ATORVASTATIN CALCIUM 40 MG: 40 TABLET, FILM COATED ORAL at 08:45

## 2023-05-16 RX ADMIN — SENNOSIDES, DOCUSATE SODIUM 2 TABLET: 8.6; 5 TABLET ORAL at 18:12

## 2023-05-16 RX ADMIN — TAMSULOSIN HYDROCHLORIDE 0.4 MG: 0.4 CAPSULE ORAL at 18:13

## 2023-05-16 RX ADMIN — ASPIRIN 81 MG: 81 TABLET, COATED ORAL at 08:46

## 2023-05-16 RX ADMIN — ACETAMINOPHEN 975 MG: 325 TABLET ORAL at 05:24

## 2023-05-16 RX ADMIN — DONEPEZIL HYDROCHLORIDE 5 MG: 5 TABLET ORAL at 18:13

## 2023-05-16 RX ADMIN — SENNOSIDES, DOCUSATE SODIUM 2 TABLET: 8.6; 5 TABLET ORAL at 08:45

## 2023-05-16 RX ADMIN — ACETAMINOPHEN 975 MG: 325 TABLET ORAL at 21:09

## 2023-05-16 RX ADMIN — HEPARIN SODIUM 5000 UNITS: 5000 INJECTION INTRAVENOUS; SUBCUTANEOUS at 13:33

## 2023-05-16 RX ADMIN — HEPARIN SODIUM 5000 UNITS: 5000 INJECTION INTRAVENOUS; SUBCUTANEOUS at 05:24

## 2023-05-16 RX ADMIN — POLYETHYLENE GLYCOL 3350 17 G: 17 POWDER, FOR SOLUTION ORAL at 08:44

## 2023-05-16 RX ADMIN — METOPROLOL SUCCINATE 100 MG: 100 TABLET, EXTENDED RELEASE ORAL at 08:45

## 2023-05-16 NOTE — PROGRESS NOTES
"OT Daily Treatment Note       05/16/23 1100   Pain Assessment   Pain Assessment Tool 0-10   Pain Score No Pain   Restrictions/Precautions   Precautions Aphasia; Aspiration;Bed/chair alarms;Cognitive; Fall Risk;Pain;Supervision on toilet/commode   Lifestyle   Autonomy April cabrera for this new bed\"   Eating   Type of Assistance Needed Physical assistance   Physical Assistance Level 51%-75%   Comment session grossly focused self-feeding tasks as OT was present during lunch  OT initially allowed pt to trial self-feeding without cues or assistance but pt unsuccessful with scooping food, undershooting target, requiring loading of spoon and is then able to successfully complete spoon to mouth  self-feeding took an inc time as pt needed time to clear mouth of food  Eating CARE Score 2   Grooming   Findings TA for shaving of face in supine as per pt request   Roll Left and Right   Type of Assistance Needed Incidental touching   Physical Assistance Level No physical assistance   Comment CGA during rolling L and R   Roll Left and Right CARE Score 4   Bed-Chair Transfer   Type of Assistance Needed Physical assistance   Physical Assistance Level Total assistance   Comment A x 3 angela from tilt-in space > bed   Chair/Bed-to-Chair Transfer CARE Score 1   [address]   Type of Assistance Needed Physical assistance   Physical Assistance Level 76% or more   Comment A x 1 in supine; TA for bowel hygiene, max A for CM up/down  pt able to bridge to provide assistance     Toileting Hygiene CARE Score 2   Toilet Transfer   Type of Assistance Needed Physical assistance   Physical Assistance Level Total assistance   Comment bedpan placement   Toilet Transfer CARE Score 1   Cognition   Overall Cognitive Status Impaired   Arousal/Participation Cooperative   Attention Difficulty attending to directions   Orientation Level Oriented to person   Memory Decreased short term memory;Decreased recall of recent events;Decreased recall of " precautions   Following Commands Follows multistep commands with increased time or repetition   Activity Tolerance   Activity Tolerance Patient limited by fatigue   Assessment   Treatment Assessment Pt participated in skilled OT session with focus on ADL retraining and functional transfer training  See flowsheet for details of session and current functional status  Pt is limited by weakness, decreased ROM, impaired balance, decreased endurance, decreased coordination, increased fall risk, pain, decreased activity tolerance, decreased safety awareness, impaired judgement, decreased sensation and decreased strength and requires skilled OT services to increase independence and safety with ADL completion in prep for DC pending progress  Plan to continue ADL retraining, functional transfer training, UE strengthening/ROM, endurance training, cognitive reorientation, Pt/caregiver education, neuro re-ed, fine motor coordination activities, compensatory technique education, continued education, energy conservation and activity engagement  to address barriers mentioned above  Prognosis Fair   Problem List Decreased strength;Decreased endurance; Impaired balance;Decreased mobility; Decreased coordination;Decreased cognition;Decreased range of motion; Impaired judgement;Decreased safety awareness; Impaired vision; Impaired sensation; Impaired tone   Barriers to Discharge Inaccessible home environment;Decreased caregiver support   Plan   Treatment/Interventions ADL retraining;Functional transfer training; Therapeutic exercise; Endurance training;Patient/family training; Compensatory technique education   Progress Slow progress, decreased activity tolerance   OT Therapy Minutes   OT Time In 1100   OT Time Out 1200   OT Total Time (minutes) 60   OT Mode of treatment - Individual (minutes) 60   OT Mode of treatment - Concurrent (minutes) 0   OT Mode of treatment - Group (minutes) 0   OT Mode of treatment - Co-treat (minutes) 0   OT Mode of Treatment - Total time(minutes) 60 minutes   OT Cumulative Minutes 265   Therapy Time missed   Time missed?  No

## 2023-05-16 NOTE — ASSESSMENT & PLAN NOTE
SCDs, ambulation, and heparin 5000 units SQ TID with fair amount of ongoing immobility (also on aspirin, plavix, and pletal)  - Hold heparin at discretion of SNF provider once discharged from [facility]

## 2023-05-16 NOTE — PROGRESS NOTES
Pastoral Care Progress Note    2023  Patient: Wilber Trevino : 1960  Admission Date & Time: 2023 1435  MRN: [id number] CSN: [id number]          Pt has been visited regularly by [facility]    I stopped in today to introduce myself and see if Pt needed anything from me  He said he's good, no need   remains available, yet will defer to [facility]CK smith to do the primary visits to this Pt

## 2023-05-16 NOTE — PROGRESS NOTES
Physical Medicine and Rehabilitation Progress Note  Sonal Limon 58 y o  male MRN: [id number]  Unit/Bed#: -01 Encounter: [id number]      Assessment & Plan:     Decline in ADLs and mobility: Functional assessment - improving         FIM  Care Score  Admit Score Recent Score    Total assist  1-100% or 2p    Tot ADLs    Max assist 2-51-75%    Sub  To hygiene, bathing, LBD   Mod assist 3- 26-50%  Par  UBD   Min assist 3- 25% or < Par     CG assist 4  TA     Sup/Setup 4-5 Sup     Mod-I/Indep 6 MI      Transfers  Total assist  Total assist     Ambulation   Total assist  Total assist (30 ft mod assist x2)    Stairs   Total assist/NT      Goal: CGA-supervision for most ADLs and  for mobility  Major barriers:  Impaired cognition, speech, balance, deconditioning  Dispo: Home with ELOS 21+ days from admission      * Stroke [facility])  Assessment & Plan  5/16 - Function improving; neuro exam stable; did walk with 2 person assist and was OOB good amount of day; mood still frustrated at times and provided supportive counseling and deep breathing exercises; he does have some activity intolerance and will write for 900 min program if needed   5/16 Extended discussion with wife and she does feel overall he is doing better - less lethargic, improving coordination; she was somewhat concerned he was in bed more first few days of rehab but appears to be improving overall on my eval and discussions with therapies today; will continue to monitor neuro exam closely   - Sub-optimal fluid/oral intake at times - IVF by IM 5/15 > encourage fluids/intake and monitor  - Nutrition c/s - continue nutritional supp; wife reports pt prefers finger foods; ensure set-up/supervision PRN; continue SLP  Recent multifocal ischemic infarcts in different distributions of unclear etiology   · Imaging revealed multifocal strokes; underwent L ICA PTA/stenting 5/4 also hx of R ICA stenosis   · Has loop recorder placed 2019 and neuro wants OP replacement of loop recorder - OP cards f/u   · CT of the chest abdomen pelvis without evidence of malignancy done on the last admission on 4/17  · A-gram not c/w vasculitis   · Thrombosis panel done in 04/18/2023 that was only abnl for AT3 that was low in the acute setting and needs to be repeated in future  Repeat OP thrombosis panel thru neuro or hematology  · Recent IVY with no PFO  · Previous TTE 04/18/23 showed ED of 55% and nl wall motion and mildly dilated L atrium  · Continue Brilinta and aspirin as well as statin for secondary stroke prophylaxis   · Continue Physical therapy, Occupational Therapy, speech therapy  · Monitor neuro exam closely       Bilateral carotid artery stenosis  Assessment & Plan  · Status post left ICA angioplasty and stent placement neurovascular service  · Per IM- S/p L ICA PTA/stent 5/4/23;  Recheck carotid doppler 6 weeks and see NS  · Continue Brilinta, aspirin and statin  · BP mgmt per IM here  · OP vasc sx follow-up      Adjustment reaction with anxiety  Assessment & Plan  Supportive counseling  Neuropsych c/s   Continue home Celexa    Urinary retention  Assessment & Plan  Cortez in place - placed prior to [facility]; mobility improving   - Recommend voiding trial 5/17 am c/ toileting program Q2-4h during day, Q4-6h overnight, record bladder scans Q4H, PVRs PRN, and voided outputs; PRN straight catheterization >450 cc; customize as indicated  - Monitor for infection   - Continue tamsulosin 0 4 mg with dinner      Leukocytosis  Assessment & Plan  · WBC up to 14 K on 15/5; no s/s of infection but remains at risk; pt afebrile  · Comgmt with IM   · Monitor for s/s of infection or other etiologies; monitor vitals/labs     Hypertriglyceridemia  Assessment & Plan  Continue statin    Primary hypertension  Assessment & Plan  · Mgmt per IM:  hydrochlorothiazide 12 5 mg daily, metoprolol succinate 100 mg daily as well as Cozaar 50 mg daily  · Hctz may be stopped by IM     GERD (gastroesophageal reflux "disease)  Assessment & Plan  Continue pantoprazole and as needed Tums    At risk for venous thromboembolism (VTE)  Assessment & Plan  SCDs, ambulation, and heparin       Abnormal laboratory test  Assessment & Plan  AT3 89% on 4/2023 lab per prior providers; can be low from recent stroke and not true AT3 def  - Recommend follow-up with neurology and repeat thrombosis panel as an outpatient     Hypokalemia  Assessment & Plan  · Remains improved 5/16    Chronic ischemic left MCA stroke  Assessment & Plan  · Recent left MCA stroke in the beginning of April at 4/16/2023 with aphasia and was empirically treated with Eliquis 5 mg twice daily as well as aspirin 81 mg due to embolic stroke of undetermined source per prior documentation  · Despite this we presented with additional strokes for this admission    History of tobacco use  Assessment & Plan  · Patient with a history of tobacco use yet quit in 2019  · Does not need nicotine patches    Prediabetes  Assessment & Plan  · Last hemoglobin A1c of 5 8  · Mgmt per IM during [facility]C   · Started on consistent carbohydrate 3 diet here in addition to cardiac diet on admission to [facility]    Chronic low back pain  Assessment & Plan  · Continue acetaminophen and as needed medications for low back pain  · Per prior provider \"Continue to progress and wean at this time  Had a discussion with the patient on arrival after reviewing notes from Dr Hoda Apodaca, and review of PDMP patient was never previously on opioid therapy except for short course of tramadol  Here he has been on oxycodone 10 mg dosing for severe pain as well as Norco and oxycodone 5 mg  On admission spoke with patient and will change him to a oxycodone 2 5 mg for moderate pain and 5 mg for severe pain with the plan of overall weaning till he is no longer on this medication    No clear indication for chronic opioid therapy at this time\"  · Continue lidocaine patch and aqua K-pad but not in the same area at the same time    Memory " "deficits  Assessment & Plan  · Started on Aricept due to memory difficulties after stroke in 2019  · Currently on 5 mg twice daily  · Sees Dr Lashaun Meyers in outpatient neurology    Chronic ischemic right MCA stroke  Assessment & Plan  · Patient with history of right MCA stroke back in March 2019 status post thrombectomy  · Had loop recorder placement without overt arrhythmia and was on Plavix 75 mg at that time  Also with known severe right M1 stenosis  · See \"Stroke\" mgmt above         Other Medical Issues:  • Monitor for     Follow-up providers and other issues to be followed up after discharge  PCP  Neuro  Cards  Vasc Sx     CODE: Level 1: Full Code    Restrictions include: Fall precautions    Objective:     Allergies per EMR  Diagnostic Studies: Reviewed, no new imaging  No orders to display     See above as well    Laboratory: Labs reviewed  Results from last 7 days   Lab Units 05/15/23  0642 05/13/23  0559   HEMOGLOBIN g/dL 13 3 12 8   HEMATOCRIT % 37 9 36 5   WBC Thousand/uL 14 23* 12 84*     Results from last 7 days   Lab Units 05/16/23  0549 05/15/23  0642 05/13/23  0559   BUN mg/dL 24 22 27*   POTASSIUM mmol/L 3 9 4 2 3 5   CHLORIDE mmol/L 106 104 104   CREATININE mg/dL 1 03 1 29 1 22   AST U/L  --   --  39   ALT U/L  --   --  86*              Drug regimen reviewed, all potential adverse effects identified and addressed:    Scheduled Meds:  Current Facility-Administered Medications   Medication Dose Route Frequency Provider Last Rate   • acetaminophen  975 mg Oral Q8H [facility] Mariposa Marker, DO     • aspirin  81 mg Oral Daily Mariposa Marker, DO     • atorvastatin  40 mg Oral Daily Mariposa Marker, DO     • bisacodyl  10 mg Rectal Daily PRN Mariposa Marker, DO     • calcium carbonate  1,000 mg Oral Daily PRN Mariposa Marker, DO     • citalopram  20 mg Oral Daily Mariposa Marker, DO     • donepezil  5 mg Oral BID Mariposa Marker, DO     • heparin (porcine)  5,000 Units Subcutaneous [facility] Mariposa Marker, DO     • lidocaine  " "1 patch Topical Daily [place] Cons, DO     • [START ON 5/17/2023] lidocaine   Topical Q4H PRN Tim Jimenez MD     • losartan  50 mg Oral Daily ERIC Agrawal     • methocarbamol  500 mg Oral Q6H PRN [place] Cons, DO     • metoprolol succinate  100 mg Oral Daily [place] Cons, DO     • ondansetron  4 mg Oral Q6H PRN [place] Cons, DO     • oxyCODONE  5 mg Oral Q6H PRN [place] Cons, DO     • oxyCODONE  2 5 mg Oral Q6H PRN [place] Cons, DO     • pantoprazole  20 mg Oral Early Morning Steve Cons, DO     • polyethylene glycol  17 g Oral QAM Steve Cons, DO     • senna  2 tablet Oral Daily PRN [place] Cons, DO     • senna-docusate sodium  2 tablet Oral BID [place] Cons, DO     • tamsulosin  0 4 mg Oral Daily With Dinner [place] Cons, DO     • ticagrelor  90 mg Oral Q12H [address], DO         Chief Complaints:  Frustrated     Subjective: On eval, patient again notes some frustration but states this is the way it has to be as he wants to get better and get home  He denies significant pain, worsening strength, fever, chills, SOB, or other new complaints  ROS: A 10 point ROS was performed; negative except as noted above         Physical Exam:  Vitals:    05/16/23 1411   BP: 142/68   Pulse: 74   Resp: 16   Temp: 98 °F (36 7 °C)   SpO2: 96%       GEN:  Lying in bed in NAD   HEENT/NECK: MMM  CARDIAC: Regular rate rhythm, no murmers, no rubs, no gallops  LUNGS:  clear to auscultation, no wheezes, rales, or rhonchi  ABDOMEN: Soft, non-tender, non-distended, normal active bowel sounds  EXTREMITIES/SKIN:  no calf edema, no calf tenderness to palpation and : Cortez in place  NEURO:   MENTAL STATUS: Stable aphasia, adequate wakefulness, able to follow most simple commands and Strength/MMT:  Near full throughout; FTN somewhat impaired bilaterally - stable; Visual field tesing stable   PSYCH:  Affect:  Frustrated    HPI:  Per admitting provider   \"Constanza Gibson is a 58 y o  male with " history of prediabetes, hypertension, depression, urinary retention on Flomax, carotid stenosis, depression, prior left MCA and right MCA stroke with aphasia and memory deficits now on Aricept who presented to the [facility] on 4/26 for AMS witnessed by his wife with abnormal speech that was noted to be similar to his prior strokes  Of note he was recently in the hospital earlier in the month for a left MCA stroke  He also had a right MCA stroke status post thrombectomy back in 2019  He is a prior smoker and quit at the time of his initial stroke  Additionally he had hyponatremia which was felt to be due to HCTZ use and potentially poor oral intake, chronic low back pain  He was seen by neurology as well as neurosurgery and eventually switched from his Eliquis/aspirin to aspirin and Brilinta  It was felt not to be cardioembolic by neurosurgery  He was also not given TNK given his recent Eliquis use when he arrived  He had imaging prior on his last admission however now with new focus of diffusion abnormality in the right frontal parietal region and in the left periatrial and parieto-occipital region  No acute hemorrhage was seen  Had a carotid Doppler showing LICA 67-00%/UDSY <03%   IVY was done and did not show any PFO or thrombus  Given that patient was on Plavix when he had his stroke on 04/16/2023, he was placed on Georgine Balm was a question of vasculitis as the etiology but Neurosurgery saw him in consult and felt that it was likely related to atherosclerotic and carotid disease and they stopped the Eliquis and placed back on ASA   He had a cerebral arteriogram on 5/4 23 with a left ICA PTA/stent placement   There was no vasculitis noted    Per Neurology full anticoagulation did not need to be restarted   The patient was evaluated by the Rehabilitation team and deemed an appropriate candidate for comprehensive inpatient rehabilitation and admitted to the [facility] on 5/12/2023 "2:35 PM\"    ** Please Note: Fluency Direct voice to text software may have been used in the creation of this document  **    Extended conversations with wife reviewing history, management, rehab, and dispo as well as with patient  50 minutes or greater spent for this encounter which included a combination of face-to-face time with Elyssa Torres and non-face-to-face time which in part specifically includes management of CVAs  Face-to-face time included extended discussion with patient regarding current condition, medical history, mood, medical/rehabilitation management, and disposition  Non face-to-face time included coordination of care with patient's co-managing AP and/or physician(s) thru communication and review of their recent documentation as well as reviewing vitals, bowel/bladder function, recent labs, and notes from therapy, CM, and nursing          "

## 2023-05-16 NOTE — PCC PHYSICAL THERAPY
pt requires 2 person assist to complete functional activities safely requiring use of AD, see above info for details of PT evaluation  Pt is a good rehab candidate with anticipated min A goals at least for household distance mobilities using Least restrictive AD with ELOS of 4 weeks  Skilled PT will work on therapeutic exercises, therapeutic activities, NMR, w/c mobility and gait training to improve overall functional indep in order for pt to return home safely with reduce risk for falls  Pt cont to need ModA x2 for all xfers SPT and functional mobility with HHA x2 for NPP with WC follow   Pt cont to lean and veer right side and cont with poor vision deficits and awareness shawn on right side  Pt will cont NMR/NPP   w/c mobility and gait training to improve overall functional indep in order for pt to return home safely with reduce risk for falls  05/30/2023    Pt overall bed mobility at S with bed rails with VC 's and CS d/t fall risk and impulsive  Pt also HHAx2 during NPP ambulation 150 -200 feet , possible using RW for SPT and short ambulation, however pt needs to cont with NPP and possible F/T if dc to home with family support or pending SNF placement   Awaiting recommendation form team  Pt will need DME's WC RW and 1st floor set up if possible

## 2023-05-16 NOTE — PROGRESS NOTES
05/16/23 0830   Pain Assessment   Pain Assessment Tool 0-10   Pain Score No Pain   Restrictions/Precautions   Precautions Aphasia; Aspiration;Bed/chair alarms;Cognitive; Fall Risk; Cortez; Impulsive;Supervision on toilet/commode;Visual deficit  (left side vision)   General   Change In Medical/Functional Status (S)  changing beds   Subjective   Subjective pt agreeable to perform skilled PT   Lying to Sitting on Side of Bed   Type of Assistance Needed Physical assistance   Physical Assistance Level Total assistance   Comment Assist of two with second person for SBA/safety   Lying to Sitting on Side of Bed CARE Score 1   Sit to Stand   Type of Assistance Needed Physical assistance   Physical Assistance Level Total assistance   Comment ModA /MaxA in parpl bars and left HR / 2nd person for WC and safety   Sit to Stand CARE Score 1   Bed-Chair Transfer   Type of Assistance Needed Physical assistance   Physical Assistance Level Total assistance   Comment slide board xfers x2-3 person right better then left side   Chair/Bed-to-Chair Transfer CARE Score 1   Car Transfer   Reason if not Attempted Safety concerns   Car Transfer CARE Score 88   Walk 10 Feet   Reason if not Attempted Safety concerns   Walk 10 Feet CARE Score 88   Walk 50 Feet with Two Turns   Reason if not Attempted Safety concerns   Walk 50 Feet with Two Turns CARE Score 88   Walk 150 Feet   Reason if not Attempted Safety concerns   Walk 150 Feet CARE Score 88   Walking 10 Feet on Uneven Surfaces   Reason if not Attempted Safety concerns   Walking 10 Feet on Uneven Surfaces CARE Score 88   Ambulation   Primary Mode of Locomotion Prior to Admission Walk   Distance Walked (feet) 30 ft  (x2 LHR WCF RHA vc for RLE foot placement)   Does the patient walk? 2  Yes   Wheelchair mobility   Type of Wheelchair Used 1   Manual   Assessment   Treatment Assessment pt engaged in skilled PT working on bed mobility and EOB core strengthening and trunk control , TA and MaxA x2 for EOB sitting , pt very implusive and no left vision , very poor insight of deficits of safety awareness   Pt xfers sit pivot to his right side better MaxAx2 person   Pt cont to have poor motor planning and overall safety awareness for fall risk ,Pt ambulate with WC follow [facility] in [facility] with VC for RLE placement / RHA 30 feet MaxA x1-2 WC follow   Pt in R [address] 23 with alarm on d/t bed change , Cont POC   Barriers to Discharge Inaccessible home environment;Decreased caregiver support   Plan   Progress Slow progress, decreased activity tolerance   PT Therapy Minutes   PT Time In 0830   PT Time Out 0930   PT Total Time (minutes) 60   PT Mode of treatment - Individual (minutes) 60   PT Mode of treatment - Concurrent (minutes) 0   PT Mode of treatment - Group (minutes) 0   PT Mode of treatment - Co-treat (minutes) 0   PT Mode of Treatment - Total time(minutes) 60 minutes   PT Cumulative Minutes 340   Therapy Time missed   Time missed?  No

## 2023-05-16 NOTE — PLAN OF CARE
Problem: PAIN - ADULT  Goal: Verbalizes/displays adequate comfort level or baseline comfort level  Description: Interventions:  - Encourage patient to monitor pain and request assistance  - Assess pain using appropriate pain scale  - Administer analgesics based on type and severity of pain and evaluate response  - Implement non-pharmacological measures as appropriate and evaluate response  - Consider cultural and social influences on pain and pain management  - Notify physician/advanced practitioner if interventions unsuccessful or patient reports new pain  Outcome: Progressing     Problem: INFECTION - ADULT  Goal: Absence or prevention of progression during hospitalization  Description: INTERVENTIONS:  - Assess and monitor for signs and symptoms of infection  - Monitor lab/diagnostic results  - Monitor all insertion sites, i e  indwelling lines, tubes, and drains  - Monitor endotracheal if appropriate and nasal secretions for changes in amount and color  - [place] appropriate cooling/warming therapies per order  - Administer medications as ordered  - Instruct and encourage patient and family to use good hand hygiene technique  - Identify and instruct in appropriate isolation precautions for identified infection/condition  Outcome: Progressing  Goal: Absence of fever/infection during neutropenic period  Description: INTERVENTIONS:  - Monitor WBC    Outcome: Progressing     Problem: SAFETY ADULT  Goal: Patient will remain free of falls  Description: INTERVENTIONS:  - Educate patient/family on patient safety including physical limitations  - Instruct patient to call for assistance with activity   - Consult OT/PT to assist with strengthening/mobility   - Keep Call bell within reach  - Keep bed low and locked with side rails adjusted as appropriate  - Keep care items and personal belongings within reach  - Initiate and maintain comfort rounds  - Make Fall Risk Sign visible to staff  - Offer Toilet advance of need  - Initiate  - Obtain necessary fall risk man  - Apply yellow socks and bracelet for high fall risk patients  - Consider moving patient to room near nurses station  Outcome: Progressing  Goal: Maintain or return to baseline ADL function  Description: INTERVENTIONS:  -  Assess patient's ability to carry out ADLs; assess patient's baseline for ADL function and identify physical deficits which impact ability to perform ADLs (bathing, care of mouth/teeth, toileting, grooming, dressing, etc )  - Assess/evaluate cause of self-care deficits   - Assess range of motion  - Assess patient's mobility; develop plan if impaired  - Assess patient's need for assistive devices and provide as appropriate  - Encourage maximum independence but intervene and supervise when necessary  - Involve family in performance of ADLs  - Assess for home care needs following discharge   - Consider OT consult to assist with ADL evaluation and planning for discharge  - Provide patient education as appropriate  Outcome: Progressing  Goal: Maintains/Returns to pre admission functional level  Description: INTERVENTIONS:  - Perform BMAT or MOVE assessment daily    - Set and communicate daily mobility goal to care team and patient/family/caregiver  - Collaborate with rehabilitation services on mobility goals if consulted  - Perform Range of Ma day  - Reposition patients    - [place] of bed for m  - Out of bed for toileting  - Record patient progress and toleration of activity level   Outcome: Progressing     Problem: DISCHARGE PLANNING  Goal: Discharge to home or other facility with appropriate resources  Description: INTERVENTIONS:  - Identify barriers to discharge w/patient and caregiver  - Arrange for needed discharge resources and transportation as appropriate  - Identify discharge learning needs (meds, wound care, etc )  - Arrange for interpretive services to assist at discharge as needed  - Refer to Case Management Department for coordinating discharge planning if the patient needs post-hospital services based on physician/advanced practitioner order or complex needs related to functional status, cognitive ability, or social support system  Outcome: Progressing     Problem: Prexisting or High Potential for Compromised Skin Integrity  Goal: Skin integrity is maintained or improved  Description: INTERVENTIONS:  - Identify patients at risk for skin breakdown  - Assess and monitor skin integrity  - Assess and monitor nutrition and hydration status  - Monitor labs   - Assess for incontinence   - Turn and reposition patient  - Assist with mobility/ambulation  - Relieve pressure over bony prominences  - Avoid friction and shearing  - Provide appropriate hygiene as needed including keeping skin clean and dry  - Evaluate need for skin moisturizer/barrier cream  - Collaborate with interdisciplinary team   - Patient/family teaching  - Consider wound care consult   Outcome: Progressing     Problem: MOBILITY - ADULT  Goal: Maintain or return to baseline ADL function  Description: INTERVENTIONS:  -  Assess patient's ability to carry out ADLs; assess patient's baseline for ADL function and identify physical deficits which impact ability to perform ADLs (bathing, care of mouth/teeth, toileting, grooming, dressing, etc )  - Assess/evaluate cause of self-care deficits   - Assess range of motion  - Assess patient's mobility; develop plan if impaired  - Assess patient's need for assistive devices and provide as appropriate  - Encourage maximum independence but intervene and supervise when necessary  - Involve family in performance of ADLs  - Assess for home care needs following discharge   - Consider OT consult to assist with ADL evaluation and planning for discharge  - Provide patient education as appropriate  Outcome: Progressing  Goal: Maintains/Returns to pre admission functional level  Description: INTERVENTIONS:  - Perform BMAT or MOVE assessment daily    - Set and communicate daily mobility goal to care team and patient/family/caregiver  - Collaborate with rehabilitation services on mobility goals if consulted  - Perform Range s a day  - Reposition patiers  - [place] pat  - Stand pat  - Ambulate patie  - Out of bed to   - Out  - Out of bed for toileting  - Record patient progress and toleration of activity level   Outcome: Progressing     Problem: Nutrition/Hydration-ADULT  Goal: Nutrient/Hydration intake appropriate for improving, restoring or maintaining nutritional needs  Description: Monitor and assess patient's nutrition/hydration status for malnutrition  Collaborate with interdisciplinary team and initiate plan and interventions as ordered  Monitor patient's weight and dietary intake as ordered or per policy  Utilize nutrition screening tool and intervene as necessary  Determine patient's food preferences and provide high-protein, high-caloric foods as appropriate       INTERVENTIONS:  - Monitor oral intake, urinary output, labs, and treatment plans  - Assess nutrition and hydration status and recommend course of action  - Evaluate amount of meals eaten  - Assist patient with eating if necessary   - Allow adequate time for meals  - Recommend/ encourage appropriate diets, oral nutritional supplements, and vitamin/mineral supplements  - Order, calculate, and assess calorie counts as needed  - Recommend, monitor, and adjust tube feedings and TPN/PPN based on assessed needs  - Assess need for intravenous fluids  - Provide specific nutrition/hydration education as appropriate  - Include patient/family/caregiver in decisions related to nutrition  Outcome: Progressing

## 2023-05-16 NOTE — PROGRESS NOTES
05/16/23 1445   Pain Assessment   Pain Assessment Tool 0-10   Pain Score No Pain   Restrictions/Precautions   Precautions Aphasia; Aspiration;Bed/chair alarms;Cognitive; Fall Risk;Pain;Supervision on toilet/commode   Comprehension   Comprehension (FIM) 3 - Understands basic info/conversation 50-74% of time   Expression   Expression (FIM) 2 - Uses only simple expressions or gestures (waves, hello)   Social Interaction   Social Interaction (FIM) 5 - Interacts appropriately with others 90% of time   Problem Solving   Problem solving (FIM) 2 - Solves basic problems 25-49% of time   Memory   Memory (FIM) 3 - Recognizes, recalls/performs 50-74%   Speech/Language/Cognition Assessment   Treatment Assessment Pt participated in skilled ST session focusing on expressive and receptive language skills  At beginning of session, SLP initiated convo about pt's [employer] tshirt  Pt presented with fairly functional comprehension of basic convo related to this topic and also elicited various short phrases with effective content and syntax, though verbalizations were of short phrases and sentences  Pt also recalled having a visitor earlier today, stating the relationship to his visitor, as well as having completed other therapies and the location of these therapies  Pt then engaged in the additional following tasks:    Expression of Orientation and Biographical Info:   Pt unable to verbally express any orientation info this date, as pt reporting greater difficulty and fatigue in afternoon sessions vs AM sessions  Pt presented with visual Fo3 options, though these were not effective due to difficulty in scanning and visual deficits  Presented with verbal yes/no questions, pt was oriented to year and place but errored for month  Pt able to verbalize reason for current admission  When attempting to review biographical info, pt with increased frustration at this time, noting minimal spontaneous speech or attempts   Pt did benefit from use of "carrier phrases and initial phonemic cues to elicit portions of info  Verbal Expression:  Engaged in a basic phrase completion task pt was verbally presented with short sentences and filled in the last word with an appropriate word in 9/10 trials, improving to 10/10 trials given a single gestural cues  In a similar task using word pairs, pt demo greater difficulty as he was 6/15 accurate in verbalizing a paired word, requiring mod to max cues consisting of phonemic cues to improve to 12/15 accurate with task  However, when completing opposites completion task, pt was more successful again and was 7/10 accurate with task, requiring only min to moderate verbal/phonemic cues to improve to 9/10 accurate  Pt inconsistently able to repeat during trials in which he required total assist of direct verbal cues  Presented with common objects, pt accurately named 4/12 objects, improving to 6/12 when including pt's close approximations of words  Pt most consistently benefited from phrase completion paired with initial phonemic cues to improve word retrieval  Minimal neologisms and paraphasias noted, but observed with more anomia in this task  Family Present:  Towards end of session, pt's family members were present  While pt was unable to name each, he was able to clearly state his relationship to 2/3 people (his MONIKA and AMBAR)  He benefited from an initial phonemic cue to state \"niece  \" When then attempting to name each plus additional family members, pt independently named his wife and nephew  Family assisted with increasing word retrieval of their names, his children and grandchildren  Pt benefited most from a combination of descriptive verbal cues and initial phonemic cues to then name 8/10 additional family members  Direct verbal models were needed to name final two people   During this time, pt presented as he had good recall of each person as he would make a comment about their characteristics, personalities, etc to " which he family indicated was accurate  Overall, pt continues to present with significant expressive and likely moderate receptive language deficits/aphasia at this time  Pt benefits from use of yes/no questions to confirm his communicative intent and to increase comprehension  At this time, pt is recommended for further skilled SLP services during acute rehab stay in order to improve both expressive/receptive language skills and to maximize overall functional communication abilities  SLP Therapy Minutes   SLP Time In 2672   SLP Time Out 8183   SLP Total Time (minutes) 60   SLP Mode of treatment - Individual (minutes) 60   SLP Mode of treatment - Concurrent (minutes) 0   SLP Mode of treatment - Group (minutes) 0   SLP Mode of treatment - Co-treat (minutes) 0   SLP Mode of Treatment - Total time(minutes) 60 minutes   SLP Cumulative Minutes 210   Therapy Time missed   Time missed?  No

## 2023-05-16 NOTE — PCC OCCUPATIONAL THERAPY
Pt is demonstrating fair progress with occupational therapy and is progressing toward long term goals for ADL, IADL, and functional transfers/mobility  Pts long term goals for ADLs are Minimum assistance and Moderate assistance with George-walker and wheelchair  Pt continues to present with impairments in activity tolerance, endurance, standing balance/tolerance, sitting balance/tolerance, UE strength, UE ROM, FMC, GMC, memory, insight, safety , judgement , attention , sequencing , sensation , visual perceptual skills , R/L discrimination , task initiation , task termination , (R) attention, proprioception , coping skills , communication, and interpersonal skills  Occupational performance remains limited by fatigue, (R) hemiparesis, (R) visual deficits , risk for falls, and home environment  Family training/education will not be required prior to D/C as pt will be [facility] to [facility]  Pt will continue to benefit from skilled acute rehab OT services to address above mentioned barriers and maximize functional independence in baseline areas of occupation to meet established treatment goals with overall decreased burden of care  Plan of care to continue to focus on ADL Retraining , LB Dressing, UB dressing, George Dressing Techniques, Functional Cognition, Functional Attention, Assessment of Cognitive function, Short Term memory, MOCA, ACLS, Standing tolerance, Standing balance , UE NMR right, midline awareness, Fine motor coordination, Gross motor coordination, Fine motor strengthening , Gross motor strengthening , R attention, Visual perceptual skills, Visual scanning, DME training/education, Family training/education, Energy conservation training/education, healthy coping education, Leisure and social pursuits, community re-integration, sitting balance, and Core/trunk control/strengthening   Goals for the upcoming week are: increasing independence with ADLs and txfrs to Ax1 for decrease cg burden        Anticipate Discharge date to be set  Pending acceptance to [facility]

## 2023-05-16 NOTE — PCC SPEECH THERAPY
Speech/language evaluation was completed on initial evaluation where pt completed the Bedside WAB in which overall bedside aphasia score deems pt to demonstrate severely impaired language deficits  Additionally, pt also demonstrates Broca's type aphasia as per Bedside Aphasia Classification  Overall, pt is presenting with moderate to sever receptive language deficits and severe expressive language/communication deficits  Pt's speech is characterized by anomia, presence of paraphasias and use of neologisms as pt does present with some components of apraxia of speech  Pt at times able to elicit functional phrases and short sentences but is most often limited by decreased verbal expression  Regarding comprehension, pt benefits from the use of yes/no questions, along with direct commands/instructions  In additional to the above language deficits, pt presents with cognitive linguistic deficits with barriers presenting as deficits in the following areas: attention, processing, STM recall, working memory, LTM recall, orientation, problem solving, executive functions, safety awareness and insight into deficits  Pt is currently functioning at mod assist for comprehension, max assist for expression, max assist for problem solving and max to mod assist for memory  Levels of fatigue are also noted to impact both functional communication and cognitive linguistic skills  Additionally, pt was evaluated for swallow function where he is presenting with functional oropharyngeal swallow skills across baseline diet of regular solids/thin liquids, therefore no further dysphagia therapy is warranted at this time  However, pt is recommended for further skilled SLP services during acute rehab stay in order to improve both expressive/receptive language skills and to maximize overall functional communication abilities       Update from week of 5/23/2023: Pt conts to be followed for skilled SLP services targeting receptive and expressive communication needs  Pt has been making good progress towards his goals, improving in his independent communication skills for basic wants and needs  Pt conts with moderate receptive and moderate to severe expressive deficits at this time  Pt is improving in verbal expression skills of biographical and situational orientation information, benefitting most from phonemic and phrase completion cues  Pt also benefits from simple yes/no questions as well as one step following commands  However written expression skills still require mod to max cues for copying, suspect some Right inattention visual skills impacting this as well  Pt is highly impacted by fatigue with tasks, and benefits from breaks, increased cuing and change of task when increased frustration occurs  Family education was initiated with wife over the phone in regards to current communication strategies- wife reports slow improvement in speech each day  Pt currently funcitoning at max A problem solving and expression, mod A memory and comprehension  Barriers cont to include decreased ST/working memory, expressive/receptive aphasia, problem solving, safety, Right visual inattention/neglect, and 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 as well  Pt will cont to benefit from further skilled SLP services targeting speech/language/cognitive skills for increased independence and decreased burden of care upon safe discharge to next level of care  Update from week of 5/30/2023: Pt conts to be followed for skilled SLP services targeting receptive and expressive communication needs  Pt has been making good progress towards his goals, improving in his independent communication skills for basic wants and needs  Pt's overall verbal expression is improving but still requiring moderate assistance with multimodal cuing (sentence completion, initial phoneme, phonetic placement, semantic)   Written expression still conts to be severely impair and difficult to produce with component of motor apraxia as well  Pt's reading comprehension is increasing as well at the word level as well as his auditory comp improving for basic conversation, yes/no questions and following directions  Pt currently functioning at max A problem solving and memory, mod A comprehension and expression  Barriers as well include ecreased ST/working memory, expressive/receptive aphasia, problem solving, safety, Right visual inattention/neglect, and 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

## 2023-05-16 NOTE — PROGRESS NOTES
Physical Medicine and Rehabilitation Progress Note  Nancy Longoria 58 y o  male MRN: [id number]  Unit/Bed#: [facility] 971-01 Encounter: [id number]      Assessment & Plan:     Decline in ADLs and mobility: Functional assessment - low level early in ARC cours        FIM  Care Score  Admit Score Recent Score    Total assist  1-100% or 2p    Tot ADLs    Max assist 2-51-75%    Sub     Mod assist 3- 26-50%  Par     Min assist 3- 25% or < Par     CG assist 4  TA     Sup/Setup 4-5 Sup     Mod-I/Indep 6 MI      Transfers  Total assist      Ambulation   Total assist      Stairs   Total assist/NT      Goal: CGA-supervision for most ADLs and  for mobility  Major barriers:  Impaired cognition, speech, balance, deconditioning  Dispo: Home with [facility]OS 21+ days from admission      * Stroke [facility])  Assessment & Plan  Recent multifocal ischemic infarcts in different distributions of unclear etiology   · Imaging revealed multifocal strokes; underwent L ICA PTA/stenting 5/4 also hx of R ICA stenosis   · Has loop recorder placed 2019 and neuro wants OP replacement of loop recorder - OP cards f/u   · CT of the chest abdomen pelvis without evidence of malignancy done on the last admission on 4/17  · A-gram not c/w vasculitis   · Thrombosis panel done in 04/18/2023 that was only abnl for AT3 that was low in the acute setting and needs to be repeated in future  Repeat OP thrombosis panel thru neuro or hematology  · Recent IVY with no PFO  · Previous TTE 04/18/23 showed ED of 55% and nl wall motion and mildly dilated L atrium  · Continue Brilinta and aspirin as well as statin for secondary stroke prophylaxis   · Continue Physical therapy, Occupational Therapy, speech therapy  · Monitor neuro exam closely       Bilateral carotid artery stenosis  Assessment & Plan  · Status post left ICA angioplasty and stent placement neurovascular service  · Per IM- S/p L ICA PTA/stent 5/4/23;  Recheck carotid doppler 6 weeks and see NS  · Continue Brilinta, aspirin and statin  · BP mgmt per IM here  · OP vasc sx follow-up      Adjustment reaction with anxiety  Assessment & Plan  Supportive counseling  Neuropsych c/s   Continue home Celexa    Urinary retention  Assessment & Plan  Cortez in place - placed prior to [address] [facility] [address] for voiding trial this week hopefully when mobility improves in next few days   Monitor for infection   Continue tamsulosin 0 4 mg with dinner      Leukocytosis  Assessment & Plan  · WBC up to 14 K on 15/5; no s/s of infection but remains at risk; pt afebrile  · Comgmt with IM   · Monitor for s/s of infection or other etiologies; monitor vitals/labs     Hypertriglyceridemia  Assessment & Plan  Continue statin    Primary hypertension  Assessment & Plan  · Mgmt per IM:  hydrochlorothiazide 12 5 mg daily, metoprolol succinate 100 mg daily as well as Cozaar 50 mg daily  · Hctz may be stopped by IM     GERD (gastroesophageal reflux disease)  Assessment & Plan  Continue pantoprazole and as needed Tums    At risk for venous thromboembolism (VTE)  Assessment & Plan  SCDs, ambulation, and heparin       Abnormal laboratory test  Assessment & Plan  AT3 89% on 4/2023 lab per prior providers; can be low from recent stroke and not true AT3 def  - Recommend follow-up with neurology and repeat thrombosis panel as an outpatient     Hypokalemia  Assessment & Plan  · Remains improved 5/15     Chronic ischemic left MCA stroke  Assessment & Plan  · Recent left MCA stroke in the beginning of April at 4/16/2023 with aphasia and was empirically treated with Eliquis 5 mg twice daily as well as aspirin 81 mg due to embolic stroke of undetermined source per prior documentation  · Despite this we presented with additional strokes for this admission    History of tobacco use  Assessment & Plan  · Patient with a history of tobacco use yet quit in 2019  · Does not need nicotine patches    Prediabetes  Assessment & Plan  · Last hemoglobin A1c of 5 8  · Mgmt per IM during ARC   · Started on "consistent carbohydrate 3 diet here in addition to cardiac diet on admission to [facility]    Chronic low back pain  Assessment & Plan  · Continue acetaminophen and as needed medications for low back pain  · Per prior provider \"Continue to progress and wean at this time  Had a discussion with the patient on arrival after reviewing notes from Dr Mikayla Perez, and review of PDMP patient was never previously on opioid therapy except for short course of tramadol  Here he has been on oxycodone 10 mg dosing for severe pain as well as Norco and oxycodone 5 mg  On admission spoke with patient and will change him to a oxycodone 2 5 mg for moderate pain and 5 mg for severe pain with the plan of overall weaning till he is no longer on this medication  No clear indication for chronic opioid therapy at this time\"  · Continue lidocaine patch and aqua K-pad but not in the same area at the same time    Memory deficits  Assessment & Plan  · Started on Aricept due to memory difficulties after stroke in 2019  · Currently on 5 mg twice daily  · Sees Dr Trice Noriega in outpatient neurology    Chronic ischemic right MCA stroke  Assessment & Plan  · Patient with history of right MCA stroke back in March 2019 status post thrombectomy  · Had loop recorder placement without overt arrhythmia and was on Plavix 75 mg at that time  Also with known severe right M1 stenosis  · See \"Stroke\" mgmt above           Other Medical Issues:  • Monitor for     Follow-up providers and other issues to be followed up after discharge  PCP  Neuro  Cards  [facility] Sx     CODE: Level 1: Full Code    Restrictions include: Fall precautions    Objective:     Allergies per EMR  Diagnostic Studies: Reviewed, no new imaging  No orders to display     See above as well    Laboratory: Labs reviewed  Results from last 7 days   Lab Units 05/15/23  0642 05/13/23  0559   HEMOGLOBIN g/dL 13 3 12 8   HEMATOCRIT % 37 9 36 5   WBC Thousand/uL 14 23* 12 84*     Results from last 7 days   Lab " Units 05/15/23  0642 05/13/23  0559   BUN mg/dL 22 27*   SODIUM mmol/L 132* 134*   POTASSIUM mmol/L 4 2 3 5   CHLORIDE mmol/L 104 104   CREATININE mg/dL 1 29 1 22   AST U/L  --  39   ALT U/L  --  86*            Drug regimen reviewed, all potential adverse effects identified and addressed:    Scheduled Meds:  Current Facility-Administered Medications   Medication Dose Route Frequency Provider Last Rate   • acetaminophen  975 mg Oral Q8H [address] Teresa Mitch, DO     • aspirin  81 mg Oral Daily Teresa Mitch, DO     • atorvastatin  40 mg Oral Daily Teresa Mitch, DO     • bisacodyl  10 mg Rectal Daily PRN Teresa Mitch, DO     • calcium carbonate  1,000 mg Oral Daily PRN Teresa Mitch, DO     • citalopram  20 mg Oral Daily Teresa Mitch, DO     • donepezil  5 mg Oral BID Teresa Mitch, DO     • heparin (porcine)  5,000 Units Subcutaneous Q8H [address] Teresa Mitch, DO     • lidocaine  1 patch Topical Daily Teresa Mitch, DO     • losartan  50 mg Oral Daily ERIC Haque     • methocarbamol  500 mg Oral Q6H PRN Teresa Mitch, DO     • metoprolol succinate  100 mg Oral Daily Teresa Mitch, DO     • ondansetron  4 mg Oral Q6H PRN Teresa Mitch, DO     • oxyCODONE  5 mg Oral Q6H PRN Teresa Mitch, DO     • oxyCODONE  2 5 mg Oral Q6H PRN Teresa Mitch, DO     • pantoprazole  20 mg Oral Early Morning Teresa Mitch, DO     • polyethylene glycol  17 g Oral QAM Teresa Mitch, DO     • senna  2 tablet Oral Daily PRN Teresa Mitch, DO     • senna-docusate sodium  2 tablet Oral BID Teresa Mitch, DO     • tamsulosin  0 4 mg Oral Daily With Dinner Teresa Mitch, DO     • ticagrelor  90 mg Oral Q12H [address], DO         Chief Complaints:  Frustrated     Subjective: On eval, patient notes some frustration at times with speech and condition  He denies worsening strength, speech, sensation, SOB, calf pain, fever, chills, bladder cramping or other new complaints       ROS: A 10 point ROS was performed; negative except "as noted above  Physical Exam:  05/15/23 0540 97 8 °F (36 6 °C) 75 18 139/63 -- 94 % None (Room air)     Vitals above reviewed on date of encounter    GEN:  Lying in bed in NAD   HEENT/NECK: MMM  CARDIAC: Regular rate rhythm, no murmers, no rubs, no gallops  LUNGS:  clear to auscultation, no wheezes, rales, or rhonchi  ABDOMEN: Soft, non-tender, non-distended, normal active bowel sounds  EXTREMITIES/SKIN:  no calf edema, no calf tenderness to palpation and : Cortez in place  NEURO:   MENTAL STATUS: Some expressive aphasia but able to speak in short phrases and words appropriately fair amount of time; adequate wakefulness and attention and Strength/MMT:  Near full throughout; FTN somewhat impaired bilaterally   PSYCH:  Affect:  Frustrated    HPI:  Per admitting provider   \"Constanza Milner is a 58 y o  male with history of prediabetes, hypertension, depression, urinary retention on Flomax, carotid stenosis, depression, prior left MCA and right MCA stroke with aphasia and memory deficits now on Aricept who presented to the [address] on 4/26 for AMS witnessed by his wife with abnormal speech that was noted to be similar to his prior strokes  Of note he was recently in the hospital earlier in the month for a left MCA stroke  He also had a right MCA stroke status post thrombectomy back in 2019  He is a prior smoker and quit at the time of his initial stroke  Additionally he had hyponatremia which was felt to be due to HCTZ use and potentially poor oral intake, chronic low back pain  He was seen by neurology as well as neurosurgery and eventually switched from his Eliquis/aspirin to aspirin and Brilinta  It was felt not to be cardioembolic by neurosurgery  He was also not given TNK given his recent Eliquis use when he arrived    He had imaging prior on his last admission however now with new focus of diffusion abnormality in the right frontal parietal region and in the left " "periatrial and parieto-occipital region  No acute hemorrhage was seen  Had a carotid Doppler showing LICA 60-16%/FQVW <43%   IVY was done and did not show any PFO or thrombus  Given that patient was on Plavix when he had his stroke on 04/16/2023, he was placed on [place] Ritu was a question of vasculitis as the etiology but Neurosurgery saw him in consult and felt that it was likely related to atherosclerotic and carotid disease and they stopped the Eliquis and placed back on ASA   He had a cerebral arteriogram on 5/4 23 with a left ICA PTA/stent placement   There was no vasculitis noted    Per Neurology full anticoagulation did not need to be restarted  The patient was evaluated by the Rehabilitation team and deemed an appropriate candidate for comprehensive inpatient rehabilitation and admitted to the [facility] on 5/12/2023  2:35 PM\"    ** Please Note: Fluency Direct voice to text software may have been used in the creation of this document  **    I personally performed the required components and examined the patient myself in person on 5/15/23        "

## 2023-05-16 NOTE — PROGRESS NOTES
Internal Medicine Progress Note  Patient: Ranjan Espinosa  Age/sex: 58 y o  male  Medical Record #: [id number]      ASSESSMENT/PLAN: (Interval History)  Ranjan Espinosa is seen and examined and management for following issues:    Acute multifocal ischemic strokes  • Occurred in different arterial distributions  • IVY was negative for thrombus/PFO  • Felt not to be vasculitis and negative by a-gram 5/4/23  • Continue ASA/Brilinta/statin     Left ICA stenosis  • Was noted to have all of the CVAs in last month have been in left anterior circulation  • S/p PTA/stent 5/4/23  • Recheck carotid doppler 6 weeks and see NS  • Continue AP/statin     Right ICA stenosis  • To followup with Vasc surgery as OP  • Continue AP/statin     LOOP recorder  • Was placed 3/2019  • Neuro wants it to be replaced = for OP to Cardiology     Leukocytosis  • Had no fevers  • CXR and Procal were normal  • Stable  • CBC 5/18/23     HTN  • Home:  Toprol 100mg qd/Norvasc 10mg qd/Losartan 50mg qd/HCTZ 25mg qd/Hydralazine 25mg TID  • Here:  Toprol 100mg qd/Losartan 50mg qd  • HCTZ was decreased to 12 5mg qd before transfer here to [facility]; stopped  starting 5/16/23     Pre DM  • HbA1C 5 8  • Takes Metformin at home but currently not on it in hospital  • Wife didn't want him to have Accuchecks noting that he was pre-DM so they were stopped in hospital   The BSs had been very normal in acute anyway  • Will watch fastings with BMPs and maintain DM diet     Depression  • Continue celexa as at home     Memory loss  • Continue Aricept as at home  • He has had memory issues since his CVA in 2019     Urine retention  • Has jo placed 5/8/23  • VT per PMR  • Continue Flomax     Chronic LBP  • MRI showed advanced multilevel spondylosis, slightly progressed on the right at L2-3 compared to the prior study but otherwise stable     • Pain management per PMR     AAA  • Found on L-spine MRI  • Measures 3 4 cm  • OP followup     Hyponatremia  • Na 132 on 5/15 and after 1 liter NSS now 133  • Stopped HCTZ starting today 5/16/23      Abnormal creatinine  • Stopped HCTZ  • On 5/15/23, gave 1 liter NSS x 1  • BMP AM 5/18/23        Discharge date:  Team       The above assessment and plan was reviewed and updated as determined by my evaluation of the patient on 5/16/2023      Labs:   Results from last 7 days   Lab Units 05/15/23  0642 05/13/23  0559   WBC Thousand/uL 14 23* 12 84*   HEMOGLOBIN g/dL 13 3 12 8   HEMATOCRIT % 37 9 36 5   PLATELETS Thousands/uL 352 311     Results from last 7 days   Lab Units 05/16/23  0549 05/15/23  0642   SODIUM mmol/L 133* 132*   POTASSIUM mmol/L 3 9 4 2   CHLORIDE mmol/L 106 104   CO2 mmol/L 25 28   BUN mg/dL 24 22   CREATININE mg/dL 1 03 1 29   CALCIUM mg/dL 9 1 9 5                   Review of Scheduled Meds:  Current Facility-Administered Medications   Medication Dose Route Frequency Provider Last Rate   • acetaminophen  975 mg Oral Q8H [address] Teresa Mitch, DO     • aspirin  81 mg Oral Daily Teresa Mitch, DO     • atorvastatin  40 mg Oral Daily Teresa Mitch, DO     • bisacodyl  10 mg Rectal Daily PRN Teresa Mitch, DO     • calcium carbonate  1,000 mg Oral Daily PRN Teresa Mitch, DO     • citalopram  20 mg Oral Daily Teresa Mitch, DO     • donepezil  5 mg Oral BID Teresa Mitch, DO     • heparin (porcine)  5,000 Units Subcutaneous Q8H [address] Teresa Mitch, DO     • lidocaine  1 patch Topical Daily Teresa Mitch, DO     • losartan  50 mg Oral Daily ERIC Haque     • methocarbamol  500 mg Oral Q6H PRN Teresa Mitch, DO     • metoprolol succinate  100 mg Oral Daily Teresa Mitch, DO     • ondansetron  4 mg Oral Q6H PRN Teresa Mitch, DO     • oxyCODONE  5 mg Oral Q6H PRN Teresa Mitch, DO     • oxyCODONE  2 5 mg Oral Q6H PRN Teresa Mitch, DO     • pantoprazole  20 mg Oral Early Morning Teresa Mitch, DO     • polyethylene glycol  17 g Oral QAM Teresa Mitch, DO     • senna  2 tablet Oral Daily PRN Teresa Meyer, DO     • senna-docusate sodium  2 tablet Oral BID Leigha Arroyo,      • tamsulosin  0 4 mg Oral Daily With Dinner Leigha Arroyo, DO     • ticagrelor  90 mg Oral Q12H [address] Leigha Arroyo, DO         Subjective/ HPI: Patient seen and examined  Patients overnight issues or events were reviewed with nursing or staff during rounds or morning huddle session  New or overnight issues include the following:     No new or overnight issues  Offers no complaints    ROS:   A 10 point ROS was performed; negative except as noted above  Imaging:     No orders to display       *Labs /Radiology studies reviewed  *Medications reviewed and reconciled as needed  *Please refer to order section for additional ordered labs studies  *Case discussed with primary attending during morning huddle case rounds    Physical Examination:  Vitals:   Vitals:    05/16/23 0500 05/16/23 0839 05/16/23 1326 05/16/23 1411   BP: 157/65 161/70 121/59 142/68   BP Location: Right arm Right arm  Right arm   Pulse: 75 71 65 74   Resp: 18   16   Temp: 97 9 °F (36 6 °C)   98 °F (36 7 °C)   TempSrc: Oral   Oral   SpO2: 95%   96%   Weight:           General Appearance: no distress, conversive  HEENT:  External ear normal   Nose normal w/o drainage  Mucous membranes are moist  Oropharynx is clear  Conjunctiva clear w/o icterus or redness  Neck:  Supple, normal ROM  Lungs: BBS without crackles/wheeze/rhonchi; respirations unlabored with normal inspiratory/expiratory effort  No retractions noted  On RA  CV: regular rate and rhythm; no rubs/murmurs/gallops, PMI normal   ABD: Abdomen is soft  Bowel sounds all quadrants  Nontender with no distention  EXT: no edema  Skin: normal turgor, normal texture, no rashes  Psych: affect normal, mood normal  Neuro: AA  Seems much brighter today and communicating more  +expressive>receptive aphasia    The above physical exam was reviewed and updated as determined by my evaluation of the patient on 5/16/2023      Invasive Devices Peripheral Intravenous Line  Duration           Peripheral IV 05/16/23 Dorsal (posterior); Left Hand <1 day          Drain  Duration           Urethral Catheter Latex 16 Fr  7 days                   VTE Pharmacologic Prophylaxis: Heparin  Code Status: Level 1 - Full Code  Current Length of Stay: 4 day(s)      Total time spent:  30 minutes with more than 50% spent counseling/coordinating care  Counseling includes discussion with patient re: progress  and discussion with patient of his/her current medical state/information  Coordination of patient's care was performed in conjunction with primary service  Time invested included review of patient's labs, vitals, and management of their comorbidities with continued monitoring  In addition, this patient was discussed with medical team including physician and advanced extenders  The care of the patient was extensively discussed and appropriate treatment plan was formulated unique for this patient  Medical decision making for the day was made by supervising physician unless otherwise noted in their attestation statement  ** Please Note:  voice to text software may have been used in the creation of this document   Although proof errors in transcription or interpretation are a potential of such software**

## 2023-05-16 NOTE — PCC NURSING
Pt with change in mental staus and Acute multifocal ischemic strokes Occurred in different arterial distributions IVY was negative for thrombus/PFO Felt not to be vasculitis and negative by a-gram 5/4/23 Continue ASA/Brilinta Continue statin Left ICA stenosis- Was noted to have all of the CVAs in last month have been in left anterior circulation S/p PTA/stent 5/4/23 Recheck carotid doppler 6 weeks and see NS Continue AP/statin Right ICA stenosis- To followup with Vasc surgery as OP Continue AP/statin LOOP recorder  Was placed 3/2019 Neuro wants it replaced = for OP to Cardiology  Leukocytosis- Had no fevers CXR and Procal were normal HTN-Home:  Toprol 100mg qd/Norvasc 10mg qd/Losartan 50mg qd/HCTZ 25mg qd/Hydralazine 25mg TID Here:  Toprol 100mg qd/Losartan 50mg qd/HCTZ 12 5mg qd HCTZ was decreased to 12 5mg qd today before transfer [facility] to [facility]; stopped  starting 5/16/23 Pre DM-HbA1C 5 8 Takes Metformin at home but currently not on it in hospital-Wife didn't want him to have Accuchecks noting that he was pre-DM so they were stopped in hospital   The BSs had been very normal in acute anyway Will watch fastings with BMPs For DM diet which he had not been on in acute  Dr Chidi Jackson spoke with him about that and he was ok with that plan Depression-Continue celexa as at home Memory loss- Continue Aricept as at home He has had memory issues since his CVA in 2019 Urine retention-Has jo- Continue Flomax   Chronic LBP- MRI showed advanced multilevel spondylosis, slightly progressed on the right at L2-3 compared to the prior study but otherwise stable  AAA- Found on L-spine MRI Measures 3 4 cm  Hyponatremia-Na 132 Abnormal creatinine-Stop HCTZ  Will give 1 liter NSS x 1 l  BMP AM and hold Cozaar until see labs tomorrow 5/16/3  Pt has jo- is inc BM  Pt requires alarms for safety  5/24--Pt cont to require med adjustments for BP control  Pt remains inc of bowel  Pt failed void trial and jo reinserted by urology  Blood sugar checks stable and d/c  This week we will encourage independence with ADLs  We will monitor labs and vital signs  WE will educate pt/family about repositioning to prevent skin breakdown  We will assist w repositioning and perform routine skin checks  We will monitor for adequate pain control  We will monitor for constipation and medicate pt as ordered  We will increase safety awareness and keep pt free from falls

## 2023-05-17 PROCEDURE — 92507 TX SP LANG VOICE COMM INDIV: CPT

## 2023-05-17 PROCEDURE — 97112 NEUROMUSCULAR REEDUCATION: CPT

## 2023-05-17 PROCEDURE — 99233 SBSQ HOSP IP/OBS HIGH 50: CPT

## 2023-05-17 PROCEDURE — 97535 SELF CARE MNGMENT TRAINING: CPT

## 2023-05-17 PROCEDURE — 99232 SBSQ HOSP IP/OBS MODERATE 35: CPT | Performed by: NURSE PRACTITIONER

## 2023-05-17 PROCEDURE — 97530 THERAPEUTIC ACTIVITIES: CPT

## 2023-05-17 RX ADMIN — TICAGRELOR 90 MG: 90 TABLET ORAL at 20:59

## 2023-05-17 RX ADMIN — TAMSULOSIN HYDROCHLORIDE 0.4 MG: 0.4 CAPSULE ORAL at 17:50

## 2023-05-17 RX ADMIN — ACETAMINOPHEN 975 MG: 325 TABLET ORAL at 05:50

## 2023-05-17 RX ADMIN — SENNOSIDES, DOCUSATE SODIUM 2 TABLET: 8.6; 5 TABLET ORAL at 17:50

## 2023-05-17 RX ADMIN — LOSARTAN POTASSIUM 50 MG: 50 TABLET, FILM COATED ORAL at 11:04

## 2023-05-17 RX ADMIN — HEPARIN SODIUM 5000 UNITS: 5000 INJECTION INTRAVENOUS; SUBCUTANEOUS at 05:50

## 2023-05-17 RX ADMIN — HEPARIN SODIUM 5000 UNITS: 5000 INJECTION INTRAVENOUS; SUBCUTANEOUS at 14:45

## 2023-05-17 RX ADMIN — HEPARIN SODIUM 5000 UNITS: 5000 INJECTION INTRAVENOUS; SUBCUTANEOUS at 20:58

## 2023-05-17 RX ADMIN — DONEPEZIL HYDROCHLORIDE 5 MG: 5 TABLET ORAL at 07:54

## 2023-05-17 RX ADMIN — TICAGRELOR 90 MG: 90 TABLET ORAL at 07:55

## 2023-05-17 RX ADMIN — PANTOPRAZOLE SODIUM 20 MG: 20 TABLET, DELAYED RELEASE ORAL at 05:50

## 2023-05-17 RX ADMIN — DONEPEZIL HYDROCHLORIDE 5 MG: 5 TABLET ORAL at 17:50

## 2023-05-17 RX ADMIN — SENNOSIDES, DOCUSATE SODIUM 2 TABLET: 8.6; 5 TABLET ORAL at 07:53

## 2023-05-17 RX ADMIN — CITALOPRAM HYDROBROMIDE 20 MG: 20 TABLET ORAL at 07:54

## 2023-05-17 RX ADMIN — ACETAMINOPHEN 975 MG: 325 TABLET ORAL at 14:45

## 2023-05-17 RX ADMIN — ASPIRIN 81 MG: 81 TABLET, COATED ORAL at 07:53

## 2023-05-17 RX ADMIN — METOPROLOL SUCCINATE 100 MG: 100 TABLET, EXTENDED RELEASE ORAL at 07:54

## 2023-05-17 RX ADMIN — ACETAMINOPHEN 975 MG: 325 TABLET ORAL at 20:58

## 2023-05-17 RX ADMIN — ATORVASTATIN CALCIUM 40 MG: 40 TABLET, FILM COATED ORAL at 07:54

## 2023-05-17 NOTE — PROGRESS NOTES
05/17/23 1252   Pain Assessment   Pain Assessment Tool 0-10   Pain Score No Pain   Restrictions/Precautions   Precautions Aphasia;Bed/chair alarms;Cognitive; Fall Risk;Impulsive;Supervision on toilet/commode;Visual deficit;Pressure Ulcer   Subjective   Subjective pt engaged in skilled PT and agreeable to perform skilled PT   Sit to Stand   Type of Assistance Needed Physical assistance   Physical Assistance Level Total assistance   Comment ModA x2 -3 person   Sit to Stand CARE Score 1   Bed-Chair Transfer   Type of Assistance Needed Physical assistance   Physical Assistance Level Total assistance   Comment ModA x2 SPT w/o AD   Chair/Bed-to-Chair Transfer CARE Score 1   Car Transfer   Reason if not Attempted Safety concerns   Car Transfer CARE Score 88   Walk 10 Feet   Type of Assistance Needed Physical assistance   Physical Assistance Level Total assistance   Comment HHA x2 WC follow   Walk 10 Feet CARE Score 1   Walk 50 Feet with Two Turns   Type of Assistance Needed Physical assistance   Physical Assistance Level Total assistance   Comment HHA x2 WC follow NPP   Walk 50 Feet with Two Turns CARE Score 1   Walk 150 Feet   Reason if not Attempted Safety concerns   Walk 150 Feet CARE Score 88   Walking 10 Feet on Uneven Surfaces   Reason if not Attempted Safety concerns   Walking 10 Feet on Uneven Surfaces CARE Score 88   Ambulation   Primary Mode of Locomotion Prior to Admission Walk   Distance Walked (feet) 80 ft  (60 feet HHA x2 WC follow)   Assist Device Roller Walker;Hand Hold   Does the patient walk? 2  Yes   Wheel 50 Feet with Two Turns   Reason if not Attempted Safety concerns   Wheel 50 Feet with Two Turns CARE Score 88   Wheel 150 Feet   Reason if not Attempted Safety concerns   Wheel 150 Feet CARE Score 88   Wheelchair mobility   Type of Wheelchair Used 1   Manual   Curb or Single Stair   Reason if not Attempted Safety concerns   1 Step (Curb) CARE Score 88   12 Steps   Reason if not Attempted Safety concerns   12 Steps CARE Score 88   Picking Up Object   Reason if not Attempted Safety concerns   Picking Up Object CARE Score 88   Toilet Transfer   Comment pt needs platform BSC before trail   Therapeutic Interventions   Neuromuscular Re-Education NPP HHA x2 walking vc for lateral lean and veer right side , pt has no vision insight very very poor shawn right side   Equipment Use   NuStep lvl 2 for 12 min with left leg brace poor vision   Other Comments   Comments Also very bad aphasia added to vision deficits and cognition   Assessment   Treatment Assessment Pt perform 60 min skilled PT NPP and overall STS /SPT with ModA to MaxA x2 each side but nsging will cont to xfers SB xfer or angela lift for pt safety , PT will cont working on SPT STS and NPP ambulation and possible BWSS next session   Pt left in recliner WC will all needs in reach and tilte chair back and safety belt on with chair alarm on , Cont POC   Barriers to Discharge Inaccessible home environment;Decreased caregiver support   Plan   Progress Progressing toward goals   PT Therapy Minutes   PT Time In 1252   PT Time Out 1400   PT Total Time (minutes) 68   PT Mode of treatment - Individual (minutes) 68   PT Mode of treatment - Concurrent (minutes) 0   PT Mode of treatment - Group (minutes) 0   PT Mode of treatment - Co-treat (minutes) 0   PT Mode of Treatment - Total time(minutes) 68 minutes   PT Cumulative Minutes 438   Therapy Time missed   Time missed?  No

## 2023-05-17 NOTE — SPEECH THERAPY NOTE
"[id number]    Phone call made to wife, Stanislaw Jorgensen, as team made one earlier and SLP unable to attend group call therefore completed now to provide update  Introduced self and role of services  Discussed swallow evaluation completed on eval and that he is appropriate for regular diet and thin liquids however recommended someone assist him with meals due to difficulties with self feeding  Stanislaw Jorgensen commented she notices he does better with finger foods- she ordered him a burger and he did well holding it and self feeding  Stanislaw Jorgensen mentioned that pt will say \"not hungry\" but he really just doesn't want to eat to avoid making a mess on himself  Reviewed pt's preferences with wife- list made and will be placed on pt's door for dietary to reference  See below as well:    Omelet with cheese, mushrooms, peppers  Sausage/marsh  Use bread/toast to make omelet into sandwich for pt to eat  Ham/cheese sandwich with mustard  Turkey/cheese sandwich with mckenzie and lettuce/tomato/onion  Cheeseburger with mckenzie  Chicken tenders  Judy's  Banana  Fresh fruit cup    Encouraged Stanislaw Jorgensen to add to it as she will be visiting the next few days to observe therapy  Provided here with dietary phone number to also be able to call when visiting or from home to assist in ordering meals ahead of time  Discussed as well speech/language portions in regards to expressive and receptive aphasia  Stanislaw Jorgensen reports she notices improvement in his expressive abilities when she visits compared to earlier in his hospitalization  Encourage Stanislaw Jorgensen to participate in sessions as able to be able to assist pt with communication strategies  Discussed plan for meeting soon and planning sessions together as able  Reviewed some of her family names to better be able to cue pt  Son- Raymundo Bazan; Daughter- Brigid Brasher  3 grandchildren- Gregorio Gregglavelle Slater)   Amita Bunk + Sammi Vasquez (Brigid Brasher)  Dog- Sloven[place] Prophet  Sister- Chepe Macias with  Ebony Ashton in [place] with wife Tio Navarro- " Eric Mack  Sister in [place]

## 2023-05-17 NOTE — PROGRESS NOTES
Physical Medicine and Rehabilitation Progress Note  Sudeep aMrtel 58 y o  male MRN: [id number]  Unit/Bed#: -01 Encounter: [id number]      Assessment & Plan:     Decline in ADLs and mobility: Functional assessment - improving         FIM  Care Score  Admit Score Recent Score    Total assist  1-100% or 2p    Tot ADLs    Max assist 2-51-75%    Sub  To hygiene, bathing, LBD   Mod assist 3- 26-50%  Par  UBD   Min assist 3- 25% or < Par     CG assist 4  TA     Sup/Setup 4-5 Sup     Mod-I/Indep 6 MI      Transfers  Total assist  Total assist     Ambulation   Total assist  Total assist (30 ft mod assist x2)    Stairs   Total assist/NT      Goal: CGA-supervision for most ADLs and  for mobility  Major barriers:  Impaired cognition, speech, balance, deconditioning  Dispo: Home with [facility] 21+ days from admission      * Stroke [facility])  Assessment & Plan  5/17 - Neuro exam stable   5/16 - Function improving; neuro exam stable; did walk with 2 person assist and was OOB good amount of day; mood still frustrated at times and provided supportive counseling and deep breathing exercises; he does have some activity intolerance and will write for 900 min program if needed   5/16 Extended discussion with wife and she does feel overall he is doing better - less lethargic, improving coordination; she was somewhat concerned he was in bed more first few days of rehab but appears to be improving overall on my eval and discussions with therapies today; will continue to monitor neuro exam closely   - Sub-optimal fluid/oral intake at times - IVF by IM 5/15 > encourage fluids/intake and monitor > improving some   - Nutrition c/s - continue nutritional supp; wife reports pt prefers finger foods; ensure set-up/supervision PRN; continue SLP  Recent multifocal ischemic infarcts in different distributions of unclear etiology   · Imaging revealed multifocal strokes; underwent L ICA PTA/stenting 5/4 also hx of R ICA stenosis   · Has loop recorder placed 2019 and neuro wants OP replacement of loop recorder - OP cards f/u   · CT of the chest abdomen pelvis without evidence of malignancy done on the last admission on 4/17  · A-gram not c/w vasculitis   · Thrombosis panel done in 04/18/2023 that was only abnl for AT3 that was low in the acute setting and needs to be repeated in future  Repeat OP thrombosis panel thru neuro or hematology  · Recent IVY with no PFO  · Previous TTE 04/18/23 showed ED of 55% and nl wall motion and mildly dilated L atrium  · Continue Brilinta and aspirin as well as statin for secondary stroke prophylaxis   · Continue Physical therapy, Occupational Therapy, speech therapy  · Monitor neuro exam closely       Bilateral carotid artery stenosis  Assessment & Plan  · Status post left ICA angioplasty and stent placement neurovascular service  · Per IM- S/p L ICA PTA/stent 5/4/23;  Recheck carotid doppler 6 weeks and see NS  · Continue Brilinta, aspirin and statin  · BP mgmt per IM here  · OP vasc sx follow-up      Adjustment reaction with anxiety  Assessment & Plan  Supportive counseling  Neuropsych c/s   Continue home Celexa    Urinary retention  Assessment & Plan  Remove jo and begin voiding trial   - toileting program Q2-4h during day, Q4-6h overnight, record bladder scans Q4H, PVRs PRN, and voided outputs; PRN straight catheterization >450 cc; customize as indicated  - Monitor for infection   - Continue tamsulosin 0 4 mg with dinner      Leukocytosis  Assessment & Plan  · WBC up to 14 K on 5/15; no s/s of infection but remains at risk; pt afebrile  · Comgmt with IM   · Monitor for s/s of infection or other etiologies; monitor vitals/labs     Hypertriglyceridemia  Assessment & Plan  Continue statin    Primary hypertension  Assessment & Plan  · Mgmt per IM:  hydrochlorothiazide 12 5 mg daily, metoprolol succinate 100 mg daily as well as Cozaar 50 mg daily  · Hctz may be stopped by IM     GERD (gastroesophageal reflux "disease)  Assessment & Plan  Continue pantoprazole and as needed Tums    At risk for venous thromboembolism (VTE)  Assessment & Plan  SCDs, ambulation, and heparin       Abnormal laboratory test  Assessment & Plan  AT3 89% on 4/2023 lab per prior providers; can be low from recent stroke and not true AT3 def  - Recommend follow-up with neurology and repeat thrombosis panel as an outpatient     Hypokalemia  Assessment & Plan  · Remains improved 5/16    Chronic ischemic left MCA stroke  Assessment & Plan  · Recent left MCA stroke in the beginning of April at 4/16/2023 with aphasia and was empirically treated with Eliquis 5 mg twice daily as well as aspirin 81 mg due to embolic stroke of undetermined source per prior documentation  · Despite this we presented with additional strokes for this admission    History of tobacco use  Assessment & Plan  · Patient with a history of tobacco use yet quit in 2019  · Does not need nicotine patches    Prediabetes  Assessment & Plan  · Last hemoglobin A1c of 5 8  · Mgmt per IM during [facility]   · Started on consistent carbohydrate 3 diet here in addition to cardiac diet on admission to [facility]    Chronic low back pain  Assessment & Plan  · Continue acetaminophen and as needed medications for low back pain  · Per prior provider \"Continue to progress and wean at this time  Had a discussion with the patient on arrival after reviewing notes from Dr Luci Small, and review of PDMP patient was never previously on opioid therapy except for short course of tramadol  Here he has been on oxycodone 10 mg dosing for severe pain as well as Norco and oxycodone 5 mg  On admission spoke with patient and will change him to a oxycodone 2 5 mg for moderate pain and 5 mg for severe pain with the plan of overall weaning till he is no longer on this medication    No clear indication for chronic opioid therapy at this time\"  · Continue lidocaine patch and aqua K-pad but not in the same area at the same time    Memory " "deficits  Assessment & Plan  · Started on Aricept due to memory difficulties after stroke in 2019  · Currently on 5 mg twice daily  · Sees Dr Sanches Found in outpatient neurology    Chronic ischemic right MCA stroke  Assessment & Plan  · Patient with history of right MCA stroke back in March 2019 status post thrombectomy  · Had loop recorder placement without overt arrhythmia and was on Plavix 75 mg at that time  Also with known severe right M1 stenosis  · See \"Stroke\" mgmt above         Other Medical Issues:  • Monitor for     Follow-up providers and other issues to be followed up after discharge  PCP  Neuro  Cards  Vas Sx     CODE: Level 1: Full Code    Restrictions include: Fall precautions    Objective:     Allergies per EMR  Diagnostic Studies: Reviewed, no new imaging  No orders to display     See above as well    Laboratory: Labs reviewed  Results from last 7 days   Lab Units 05/15/23  0642 05/13/23  0559   HEMOGLOBIN g/dL 13 3 12 8   HEMATOCRIT % 37 9 36 5   WBC Thousand/uL 14 23* 12 84*     Results from last 7 days   Lab Units 05/16/23  0549 05/15/23  0642 05/13/23  0559   BUN mg/dL 24 22 27*   POTASSIUM mmol/L 3 9 4 2 3 5   CHLORIDE mmol/L 106 104 104   CREATININE mg/dL 1 03 1 29 1 22   AST U/L  --   --  39   ALT U/L  --   --  86*              Drug regimen reviewed, all potential adverse effects identified and addressed:    Scheduled Meds:  Current Facility-Administered Medications   Medication Dose Route Frequency Provider Last Rate   • acetaminophen  975 mg Oral Q8H [facility] Teresa Mitch, DO     • aspirin  81 mg Oral Daily Teresa Mitch, DO     • atorvastatin  40 mg Oral Daily Teresa Mitch, DO     • bisacodyl  10 mg Rectal Daily PRN Teresa Mitch, DO     • calcium carbonate  1,000 mg Oral Daily PRN Teresa Mitch, DO     • citalopram  20 mg Oral Daily Teresa Mitch, DO     • donepezil  5 mg Oral BID Teresa Mitch, DO     • heparin (porcine)  5,000 Units Subcutaneous [facility] Teresa Mitch, DO     • lidocaine  " "1 patch Topical Daily Severo , DO     • lidocaine   Topical Q4H PRN Guille Toth MD     • losartan  50 mg Oral Daily ERIC Kerns     • methocarbamol  500 mg Oral Q6H PRN Severo , DO     • metoprolol succinate  100 mg Oral Daily Severo , DO     • ondansetron  4 mg Oral Q6H PRN Severo , DO     • oxyCODONE  5 mg Oral Q6H PRN Severo , DO     • oxyCODONE  2 5 mg Oral Q6H PRN Severo , DO     • pantoprazole  20 mg Oral Early Morning Severo , DO     • polyethylene glycol  17 g Oral QAM Severo , DO     • senna  2 tablet Oral Daily PRN Severo , DO     • senna-docusate sodium  2 tablet Oral BID Severo , DO     • tamsulosin  0 4 mg Oral Daily With Dinner Severo , DO     • ticagrelor  90 mg Oral Q12H [address], DO         Chief Complaints:  Stroke rehab follow-up    Subjective: On eval, patient continues with stable expressive aphasia but is able to communicate fair amount with time  He denies significant pain, worsening strength, mood, SOB, fever, chills, or other new complaints  ROS: A 10 point ROS was performed; negative except as noted above         Physical Exam:  Vitals:    05/17/23 2106   BP: (!) 185/78   Pulse: 66   Resp:    Temp: 98 2 °F (36 8 °C)   SpO2: 97%       GEN:  Lying in bed in NAD   HEENT/NECK: MMM  CARDIAC: Regular rate rhythm, no murmers, no rubs, no gallops  LUNGS:  clear to auscultation, no wheezes, rales, or rhonchi  ABDOMEN: Soft, non-tender, non-distended, normal active bowel sounds  EXTREMITIES/SKIN:  no calf edema, no calf tenderness to palpation and NA  NEURO:   MENTAL STATUS: Stable aphasia, adequate wakefulness, able to follow some commands if given enought time to correct and Strength/MMT:  Unchanged  PSYCH:  Affect:  Euthymic    HPI:  Per admitting provider   \"Constanza Bui is a 58 y o  male with history of prediabetes, hypertension, depression, urinary retention on Flomax, carotid " "stenosis, depression, prior left MCA and right MCA stroke with aphasia and memory deficits now on Aricept who presented to the [facility] on 4/26 for AMS witnessed by his wife with abnormal speech that was noted to be similar to his prior strokes  Of note he was recently in the hospital earlier in the month for a left MCA stroke  He also had a right MCA stroke status post thrombectomy back in 2019  He is a prior smoker and quit at the time of his initial stroke  Additionally he had hyponatremia which was felt to be due to HCTZ use and potentially poor oral intake, chronic low back pain  He was seen by neurology as well as neurosurgery and eventually switched from his Eliquis/aspirin to aspirin and Brilinta  It was felt not to be cardioembolic by neurosurgery  He was also not given TNK given his recent Eliquis use when he arrived  He had imaging prior on his last admission however now with new focus of diffusion abnormality in the right frontal parietal region and in the left periatrial and parieto-occipital region  No acute hemorrhage was seen  Had a carotid Doppler showing LICA 73-63%/LOYQ <58%   IVY was done and did not show any PFO or thrombus  Given that patient was on Plavix when he had his stroke on 04/16/2023, he was placed on Josem Lobato was a question of vasculitis as the etiology but Neurosurgery saw him in consult and felt that it was likely related to atherosclerotic and carotid disease and they stopped the Eliquis and placed back on ASA   He had a cerebral arteriogram on 5/4 23 with a left ICA PTA/stent placement   There was no vasculitis noted    Per Neurology full anticoagulation did not need to be restarted   The patient was evaluated by the Rehabilitation team and deemed an appropriate candidate for comprehensive inpatient rehabilitation and admitted to the [facility] on 5/12/2023  2:35 PM\"    ** Please Note: Fluency Direct voice to text software may have been used " in the creation of this document  **    50 minutes or greater spent for this encounter which included a combination of face-to-face time with the patient and non-face-to-face time which in part specifically includes management of CVAs  Face-to-face time included extended discussion with patient regarding current condition, medical history, mood, medical/rehabilitation management, and disposition  Non face-to-face time included coordination of care with patient's co-managing AP and/or physician(s) thru communication and review of their recent documentation as well as reviewing vitals, bowel/bladder function, recent labs, and notes from therapy, CM, and nursing  In addition, this patient was discussed by the interdisciplinary team in weekly case conference today  The care of the patient was extensively discussed with all care providers and an appropriate rehabilitation plan was formulated unique for this patient  Barriers were identified preventing progression of therapy and appropriate interventions were discussed with each discipline   Please see the team note for input from all disciplines regarding barriers, intervention, and discharge planning

## 2023-05-17 NOTE — PROGRESS NOTES
05/17/23 0830   Pain Assessment   Pain Assessment Tool 0-10   Pain Score No Pain   Restrictions/Precautions   Precautions Aphasia;Bed/chair alarms;Cognitive; Fall Risk;Impulsive;Supervision on toilet/commode;Visual deficit  (left vision deficits')   Subjective   Subjective pt agreeable to perform skilled PT   Lying to Sitting on Side of Bed   Type of Assistance Needed Physical assistance   Physical Assistance Level Total assistance   Comment max A x1 for trunk and LE mgmt; 2nd person SBA for safety   Lying to Sitting on Side of Bed CARE Score 1   Sit to Stand   Type of Assistance Needed Physical assistance   Physical Assistance Level Total assistance   Comment mod A x 1 to stand at tabletop; 2nd person for CG/SBA for safety  completed multiple stands during session with min vc for hand foot placement and BL knee block   Sit to Stand CARE Score 1   Bed-Chair Transfer   Type of Assistance Needed Physical assistance   Physical Assistance Level Total assistance   Comment Max A x 1 SPT no AD with 2nd person providing CG/stabilizing equipment   Chair/Bed-to-Chair Transfer CARE Score 1   Car Transfer   Reason if not Attempted Safety concerns   Car Transfer CARE Score 88   Ambulation   Primary Mode of Locomotion Prior to Admission Walk   Does the patient walk? 2  Yes   Assessment   Treatment Assessment pt engaged in 30 min PT/OT cotreat at this time , for bed mobility and xfers with OT, pt completed standing activities with PT stabilitizies pt LE and dynamic  and OT working on cogintion and overall scanning with vision and right side awareness      Barriers to Discharge Inaccessible home environment;Decreased caregiver support   Plan   Progress Progressing toward goals   PT Therapy Minutes   PT Time In 0830   PT Time Out 0900   PT Total Time (minutes) 30   PT Mode of treatment - Individual (minutes) 0   PT Mode of treatment - Concurrent (minutes) 0   PT Mode of treatment - Group (minutes) 0   PT Mode of treatment - Co-treat (minutes) 30   PT Mode of Treatment - Total time(minutes) 30 minutes   PT Cumulative Minutes 370   Therapy Time missed   Time missed?  No

## 2023-05-17 NOTE — PROGRESS NOTES
Internal Medicine Progress Note  Patient: Pan Barriga  Age/sex: 58 y o  male  Medical Record #: [id number]      ASSESSMENT/PLAN: (Interval History)  Pan Barriga is seen and examined and management for following issues:    Acute multifocal ischemic strokes  • Occurred in different arterial distributions  • IVY was negative for thrombus/PFO  • Felt not to be vasculitis and negative by a-gram 5/4/23  • Continue ASA/Brilinta/statin     Left ICA stenosis  • Was noted to have all of the CVAs in last month have been in left anterior circulation  • S/p PTA/stent 5/4/23  • Recheck carotid doppler 6 weeks and see NS  • Continue AP/statin     Right ICA stenosis  • To followup with Vasc surgery as OP  • Continue AP/statin     LOOP recorder  • Was placed 3/2019  • Neuro wants it to be replaced = for OP to Cardiology     Leukocytosis  • Had no fevers  • CXR and Procal were normal  • Stable  • CBC 5/18/23     HTN  • Home:  Toprol 100mg qd/Norvasc 10mg qd/Losartan 50mg qd/HCTZ 25mg qd/Hydralazine 25mg TID  • Here:  Toprol 100mg qd/Losartan 50mg qd  • HCTZ was decreased to 12 5mg qd before transfer here to [facility]; stopped beginning 5/16/23     Pre DM  • HbA1C 5 8  • Takes Metformin at home but currently not on it in hospital  • Wife didn't want him to have Accuchecks noting that he was pre-DM so they were stopped in hospital   The BSs had been very normal in acute anyway  • Will watch fastings with BMPs and maintain DM diet     Depression  • Continue celexa as at home     Memory loss  • Continue Aricept as at home  • He has had memory issues since his CVA in 2019     Urine retention  • Has jo placed 5/8/23  • VT per PMR  • Continue Flomax     Chronic LBP  • MRI showed advanced multilevel spondylosis, slightly progressed on the right at L2-3 compared to the prior study but otherwise stable     • Pain management per PMR     AAA  • Found on L-spine MRI  • Measures 3 4 cm  • OP followup     Hyponatremia  • Na 132 on 5/15 and after 1 liter NSS now 133  • Stopped HCTZ starting 5/16/23      Abnormal creatinine  • Stopped HCTZ  • On 5/15/23, gave 1 liter NSS x 1  • BMP AM 5/18/23        Discharge date:  Reteam       The above assessment and plan was reviewed and updated as determined by my evaluation of the patient on 5/17/2023      Labs:   Results from last 7 days   Lab Units 05/15/23  0642 05/13/23  0559   WBC Thousand/uL 14 23* 12 84*   HEMOGLOBIN g/dL 13 3 12 8   HEMATOCRIT % 37 9 36 5   PLATELETS Thousands/uL 352 311     Results from last 7 days   Lab Units 05/16/23  0549 05/15/23  0642   SODIUM mmol/L 133* 132*   POTASSIUM mmol/L 3 9 4 2   CHLORIDE mmol/L 106 104   CO2 mmol/L 25 28   BUN mg/dL 24 22   CREATININE mg/dL 1 03 1 29   CALCIUM mg/dL 9 1 9 5                   Review of Scheduled Meds:  Current Facility-Administered Medications   Medication Dose Route Frequency Provider Last Rate   • acetaminophen  975 mg Oral Q8H [address] [facility], DO     • aspirin  81 mg Oral Daily [facility], DO     • atorvastatin  40 mg Oral Daily [facility], DO     • bisacodyl  10 mg Rectal Daily PRN [facility], DO     • calcium carbonate  1,000 mg Oral Daily PRN [facility], DO     • citalopram  20 mg Oral Daily [facility], DO     • donepezil  5 mg Oral BID [facility], DO     • heparin (porcine)  5,000 Units Subcutaneous Q8H [address] [facility], DO     • lidocaine  1 patch Topical Daily [facility], DO     • lidocaine   Topical Q4H PRN Nevin Kathleen MD     • losartan  50 mg Oral Daily Fredderick Goldberg, CRNP     • methocarbamol  500 mg Oral Q6H PRN [facility], DO     • metoprolol succinate  100 mg Oral Daily [facility], DO     • ondansetron  4 mg Oral Q6H PRN [facility], DO     • oxyCODONE  5 mg Oral Q6H PRN [facility], DO     • oxyCODONE  2 5 mg Oral Q6H PRN [facility], DO     • pantoprazole  20 mg Oral Early Morning [facility], DO     • polyethylene glycol  17 g Oral QAM [facility], DO     • senna  2 tablet Oral Daily PRN Tere Rob DO     • senna-docusate sodium  2 tablet Oral BID Tere Rob DO     • tamsulosin  0 [facility]     • ticagrelor  90 mg Oral Q12H [facility] Tere Rob DO         Subjective/ HPI: Patient seen and examined  Patients overnight issues or events were reviewed with nursing or staff during rounds or morning huddle session  New or overnight issues include the following:     No new or overnight issues  Offers no complaints    ROS:   A 10 point ROS was performed; negative except as noted above  Imaging:     No orders to display       *Labs /Radiology studies reviewed  *Medications reviewed and reconciled as needed  *Please refer to order section for additional ordered labs studies  *Case discussed with primary attending during morning huddle case rounds    Physical Examination:  Vitals:   Vitals:    05/16/23 2030 05/17/23 0537 05/17/23 0754 05/17/23 1100   BP: 117/57 127/58 138/63 152/76   BP Location: Right arm Right arm Right arm Right arm   Pulse: 65 66 61 69   Resp: 18 18     Temp: 97 9 °F (36 6 °C) 97 5 °F (36 4 °C)     TempSrc: Oral Oral     SpO2: 95% 98%     Weight:  104 kg (228 lb 2 8 oz)         General Appearance: no distress, conversive  HEENT: PERRLA, conjuctiva normal; oropharynx clear; mucous membranes moist   Neck:  Supple, normal ROM  Lungs: CTA, normal respiratory effort, no retractions, expiratory effort normal  CV: regular rate and rhythm; no rubs/murmurs/gallops, PMI normal   ABD: soft; ND/NT; +BS  EXT: no edema  Skin: normal turgor, normal texture, no rashes  Psych: affect normal, mood normal  Neuro: AA; +expressive > receptive aphasia is improving    The above physical exam was reviewed and updated as determined by my evaluation of the patient on 5/17/2023  Invasive Devices     Peripheral Intravenous Line  Duration           Peripheral IV 05/16/23 Dorsal (posterior); Left Hand 1 day          Drain  Duration Urethral Catheter Latex 16 Fr  8 days                   VTE Pharmacologic Prophylaxis: Heparin  Code Status: Level 1 - Full Code  Current Length of Stay: 5 day(s)      Total time spent:  30 minutes with more than 50% spent counseling/coordinating care  Counseling includes discussion with patient re: progress  and discussion with patient of his/her current medical state/information  Coordination of patient's care was performed in conjunction with primary service  Time invested included review of patient's labs, vitals, and management of their comorbidities with continued monitoring  In addition, this patient was discussed with medical team including physician and advanced extenders  The care of the patient was extensively discussed and appropriate treatment plan was formulated unique for this patient  Medical decision making for the day was made by supervising physician unless otherwise noted in their attestation statement  ** Please Note:  voice to text software may have been used in the creation of this document   Although proof errors in transcription or interpretation are a potential of such software**

## 2023-05-17 NOTE — CASE MANAGEMENT
Together with Josephine Bello OT and Sheba Mosher, PT phone call took place with pts wife Bryn Ash  Reviewed pts current functional ability and team mtg update  Bryn Ash was made aware of the goals for the patient for dc and that he will most likely need assist and supervision  Bryn Ash stated their son and dtr in law were moving in this summer and will be able to assist  Bryn Ash works from home and although can't watch him every minute while working will make accommodations  Bryn Ash to come in tomorrow and Friday to observe therapy  Reviewed potential los as long as insurance approved  Cm inquired about pts insurance as cm is being told bcbs is primary  Bryn Ash stated [id number] is pts primary as that is his disability insurance through [employer]  Cm emailed Cami Wallace in [place] who cc'd Nohelia Burch from inpat verification dept and relayed  They both state they need to go with what is verified as primary and that is pts bcbs plan  They suggested wife phone the insurance  Cm reviewed pts hospital visits up to 3 years ago, Lianne Bryant was primary plan in January 2023, and bcbs was primary for April's admissions  Cm emailed Cami Wallace and Nohelia Burch and provided this info  Cm will follow up with pts wife when she visits tomorrow

## 2023-05-17 NOTE — PROGRESS NOTES
"   23 0900   Pain Assessment   Pain Assessment Tool 0-10   Pain Score No Pain   Restrictions/Precautions   Precautions Aphasia;Bed/chair alarms;Cognitive; Fall Risk;Impulsive;Supervision on toilet/commode;Visual deficit   Weight Bearing Restrictions No   Comprehension   Comprehension (FIM) 3 - Understands basic info/conversation 50-74% of time   Expression   Expression (FIM) 2 - Uses only simple expressions or gestures (waves, hello)   Social Interaction   Social Interaction (FIM) 5 - Requires redirection but less than 10% of the time  Problem Solving   Problem solving (FIM) 2 - Solves basic problems 25-49% of time   Memory   Memory (FIM) 3 - Recognizes, recalls/performs 50-74%   Speech/Language/Cognition Assessmetn   Treatment Assessment Pt seen for skilled speech therapy session targeting expressive and receptive language tasks  Pt alert and interactive- seen OOB down in therapy gym in tilt in space chair  Pt just complete PT/OT session prior to this therefore pt already observed to be a little fatigued however agreeable  Pt completed the following:     Verbal Expression with reciting Biographical/Orientation info  Pt was able to state his own name independently  Pt required multiple choice written cues for  and cues for reciting correctly (e g , pt pointing to \"25\" but saying \"14\")  Pt also oriented to his age with [facility] cues  Pt was able to recall his wife's name independently as well as recite his address  Pt provided [facility] cues for place but able to state name of place and situation independently  Pt needed visual and verbal cues to use orientation board for current month and year  Verbal Expression with Object Naming  Using items from [facility] kit, pt was able to confrontation name with 1220acc increasing with variety of cues- verbal, contextual, semantic, and phonemic   Observed on occ pt attempting strategies for cuing self (e g , object was \"hammer\"- pt stated \"oh I have one of these, in the garage, in " "my tool box\")  Pt encouraged to cont to use these types of strategies to maximize word finding at this time  Reading Comprehension  Attempted reading comprehension task however given smaller font on page versus severity of inattention/right field cut, required larger print on whiteboard  However pt still with increased difficulty determining tasks- word associations with MC options (Fx3)    Pt was able to complete with 3/6acc provided increased cuing with verbal and phrase completions  Noted the gym at this time was getting louder with more conversations/patient's working around him therefore pt with difficulty attending and focusing on task, defaulting to answers \"I don't know\"  Pt with increasing fatigue and frustrations towards end of session- assisted back to room for completion of angela lift to bed as pt is to not be unsupervised OOB at this time  Pt conts with moderate receptive and moderate to severe expressive deficits but will cont to make progress to maximize overall cognitive linguistic communication skills for increased independence with communicating wants and needs at this time  SLP Therapy Minutes   SLP Time In 0900   SLP Time Out 1000   SLP Total Time (minutes) 60   SLP Mode of treatment - Individual (minutes) 60   SLP Mode of treatment - Concurrent (minutes) 0   SLP Mode of treatment - Group (minutes) 0   SLP Mode of treatment - Co-treat (minutes) 0   SLP Mode of Treatment - Total time(minutes) 60 minutes   SLP Cumulative Minutes 270   Therapy Time missed   Time missed?  No       "

## 2023-05-17 NOTE — PROGRESS NOTES
"OT Daily Treatment Note       05/17/23 0800   Pain Assessment   Pain Assessment Tool 0-10   Pain Score No Pain   Restrictions/Precautions   Precautions Aphasia;Bed/chair alarms;Aspiration;Cognitive; Fall Risk;Impulsive;Supervision on toilet/commode;Visual deficit   Lifestyle   Autonomy \"whatever you say\"   Oral Hygiene   Type of Assistance Needed Incidental touching;Verbal cues   Physical Assistance Level No physical assistance   Comment seated upright in bed, pt req complete setup of toothbrush  pt insistent on brushing teeth with RUE although OT recommended against it 2* pts mild ataxia/coordination deficits  pt was able to brush teeth with mod vc to slow down, decrease amount of pressure being placed on teeth with intermittent [place] to decrease pressure but overall pt was able to brush all 4 quadrants of mouth with physical assistance  Oral Hygiene CARE Score 4   Grooming   Findings assisted pt with washing and brushing hair to remove matted pieces  able to complete with TA - pt asked for pony tail since hair is longer than he is used to  Shower/Bathe Self   Type of Assistance Needed Physical assistance   Physical Assistance Level 51%-75%   Comment SB bed level completed, pt req mod/max A for BLE, posterior bathing and thoroughness for UB as pt was able to functionally reach all parts of his UB but 2* dec motor planning/coordination, pt would drop the bath wipe or miss a body part he was trying to bathe  able to roll to facilitate posterior bathing     Shower/Bathe Self CARE Score 2   Bathing   Assessed Bath Style Sponge Bath   Positioning Seated;Supine   Upper Body Dressing   Type of Assistance Needed Physical assistance   Physical Assistance Level 26%-50%   Comment min A to orient shirt and RUE   Upper Body Dressing CARE Score 3   Lower Body Dressing   Type of Assistance Needed Physical assistance   Physical Assistance Level 51%-75%   Comment Assist to thread jo and pants over BLE, can don over hips in " supine via rolling and bridging   Lower Body Dressing CARE Score 2   Putting On/Taking Off Footwear   Type of Assistance Needed Physical assistance   Physical Assistance Level Total assistance   Putting On/Taking Off Footwear CARE Score 1   Roll Left and Right   Type of Assistance Needed Supervision;Verbal cues   Physical Assistance Level No physical assistance   Comment supv with vc to slow down when rolling as pt rolls so quickly/impuslively, he is at risk of rolling off bed  able to follow commands to slow down  Roll Left and Right CARE Score 4   Lying to Sitting on Side of Bed   Type of Assistance Needed Physical assistance   Physical Assistance Level Total assistance   Comment max A x1 for trunk and LE mgmt; 2nd person SBA for safety   Lying to Sitting on Side of Bed CARE Score 1   Sit to Stand   Type of Assistance Needed Physical assistance   Physical Assistance Level Total assistance   Comment mod A x 1 to stand at tabletop; 2nd person for CG/SBA for safety  completed multiple stands during session with min vc for hand foot placement and BL knee block   Sit to Stand CARE Score 1   Bed-Chair Transfer   Type of Assistance Needed Physical assistance   Physical Assistance Level Total assistance   Comment Max A x 1 SPT no AD with 2nd person providing CG/stabilizing equipment   Chair/Bed-to-Chair Transfer CARE Score 1   [address] 77 declined need to toilet during this session   Toilet Transfer   Comment declined need to toilet during this session   Functional Standing Tolerance   Time 2-3 mins x 3   Activity standing at tabletop   Comments pt completed standing activity at tabletop with PT focusing on standing tolerance and standing balance while OT focused on R attention, visual scanning, language/communication and functional reach   Initially, trialed pt reading numbers from L to R but pt demo inc difficulty stating numbers 2* expressive aphasia  even with phonetic cueing, pt unable to verbalize numbers accurately  then completed reaching activity with cones with use of LUE to promote WBing through RUE to reach in all planes  Req cues/min A to reposition and balance base of support before repeating task  pt also req min vc to scan far R as pt did not initiate scanning R  short rest breaks req in between sets for fatigue mgmt  Cognition   Overall Cognitive Status Impaired   Arousal/Participation Cooperative   Attention Difficulty attending to directions   Orientation Level Oriented to person   Memory Decreased short term memory;Decreased recall of recent events;Decreased recall of precautions   Following Commands Follows multistep commands with increased time or repetition   Activity Tolerance   Activity Tolerance Patient tolerated treatment well   Assessment   Treatment Assessment Pt participated in skilled OT session with focus on ADL retraining, visual perceptual retraining, functional transfer training, endurance training, neuro re-ed, energy conservation and activity engagement   See flowsheet for details of session and current functional status  Pt is limited by weakness, decreased ROM, impaired balance, decreased endurance, decreased coordination, increased fall risk, decreased ADLS, decreased IADLS, decreased activity tolerance, decreased safety awareness, impaired judgement, decreased cognition, decreased sensation, decreased strength and visual deficits and requires skilled OT services to increase independence and safety with ADL completion in prep for DC pending progress  Plan to continue ADL retraining, visual perceptual retraining, functional transfer training, UE strengthening/ROM, endurance training, cognitive reorientation, Pt/caregiver education, equipment evaluation/education, neuro re-ed, fine motor coordination activities, compensatory technique education, continued education, energy conservation and activity engagement  to address barriers mentioned above     Prognosis Fair   Problem List Decreased strength;Decreased endurance; Impaired balance;Decreased mobility; Decreased coordination;Decreased cognition;Decreased range of motion; Impaired judgement;Decreased safety awareness; Impaired vision; Impaired sensation; Impaired tone   Barriers to Discharge Inaccessible home environment;Decreased caregiver support   Plan   Treatment/Interventions ADL retraining;Functional transfer training; Therapeutic exercise; Endurance training;Cognitive reorientation;Patient/family training;Equipment eval/education; Compensatory technique education   Progress Progressing toward goals   OT Therapy Minutes   OT Time In 0800   OT Time Out 0900   OT Total Time (minutes) 60   OT Mode of treatment - Individual (minutes) 30   OT Mode of treatment - Concurrent (minutes) 0   OT Mode of treatment - Group (minutes) 0   OT Mode of treatment - Co-treat (minutes) 30   OT Mode of Treatment - Total time(minutes) 60 minutes   OT Cumulative Minutes 325   Therapy Time missed   Time missed?  No

## 2023-05-17 NOTE — TEAM CONFERENCE
Acute RehabilitationTe Conference Note  Date: 5/17/2023   Time: 11:51 AM       Patient Name:  Luci Sellers       Medical Record Number: [id number]   YOB: 1960  Sex:  Male          Room/Bed:  [facility]1/[facility] 971-01  Payor Info:  Payor: Latoya Rome / Plan: Floyde Sandifer / Product Type: Blue Fee for Service /      Admitting Diagnosis: Stroke ([place] [address] ) [I63 9]   Admit Date/Time:  5/12/2023  2:35 PM  Admission Comments: No comment available     Primary Diagnosis:  Stroke [facility])  Principal Problem: Stroke [facility])    Patient Active Problem List    Diagnosis Date Noted   • Abnormal laboratory test 05/15/2023   • Leukocytosis 05/12/2023   • History of tobacco use 05/12/2023   • Chronic ischemic left MCA stroke 05/12/2023   • Hypokalemia 05/12/2023   • Abdominal aortic aneurysm (AAA) ([place] [address] ) 05/08/2023   • Tachycardia 05/05/2023   • Prediabetes 04/27/2023   • SIRS (systemic inflammatory response syndrome) ([place] [address] ) 04/27/2023   • Chronic anticoagulation 04/26/2023   • Stroke ([place] [address] ) 04/26/2023   • Hyponatremia 04/26/2023   • Middle cerebral artery stenosis, right 04/17/2023   • Left posterior MCA stroke - etiology unclear at this time 04/17/2023   • Chronic low back pain 04/16/2023   • Hypertensive encephalopathy, transient 01/12/2023   • Snoring 08/10/2020   • Memory deficits 08/10/2020   • Chronic ischemic right MCA stroke 08/06/2019   • Status post placement of implantable loop recorder 04/06/2019   • Type 2 diabetes mellitus ([facility] ) 04/03/2019   • Adjustment reaction with anxiety 03/29/2019   • Insomnia 03/29/2019   • Fall 03/28/2019   • 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 ([facility] ) 03/28/2019   • Urinary retention 03/27/2019   • At risk for venous thromboembolism (VTE) 03/26/2019   • Hypertriglyceridemia 03/26/2019   • Nicotine dependence 03/26/2019   • Bilateral carotid artery stenosis 03/26/2019   • Presbyopia 03/26/2019   • History of stroke 03/19/2019   • Headache 03/19/2019   • Primary hypertension 03/19/2019   • GERD (gastroesophageal reflux disease) 03/19/2019       Physical Therapy:    Weight Bearing Status: Full Weight Bearing  Transfers: Assist of 2, Total Assistance  Bed Mobility: Assist of 2, Total Assistance  Amulation Distance (ft): 30 feet  Ambulation: Assist of 2, Total Assistance  Assistive Device for Ambulation: Hand Hold Assistance  Number of Stairs: 0  Discharge Recommendations: Home with:  [address] with[de-identified] Family Support, 24 Hour Assisteance, 24 Hour Supervision      pt requires 2 person assist to complete functional activities safely requiring use of AD, see above info for details of PT evaluation  Pt is a good rehab candidate with anticipated min A goals at least for household distance mobilities using Least restrictive AD with ELOS of 4 weeks  Skilled PT will work on therapeutic exercises, therapeutic activities, NMR, w/c mobility and gait training to improve overall functional indep in order for pt to return home safely with reduce risk for falls  Occupational Therapy:  Eating: Moderate Assistance  Grooming: Maximum Assistance  Bathing: Maximum Assistance  Bathing: Maximum Assistance  Upper Body Dressing: Minimal Assistance  Lower Body Dressing: Moderate Assistance  Toileting: Maximum Assistance  Toilet Transfer: Total Assistance  Cognition: Exceptions to WNL  Cognition: Decreased Memory, Decreased Executive Functions, Decreased Attention, Decreased Comprehension, Decreased Safety, Impulsive  Orientation: Person, Place  Discharge Recommendations:  (pending progress)       Pt is demonstrating fair progress with occupational therapy and is progressing toward long term goals for ADL, IADL, and functional transfers/mobility  Pts long term goals for ADLs are Minimum assistance and Moderate assistance with wheelchair   Pt continues to present with impairments in activity tolerance, endurance, standing balance/tolerance, sitting balance/tolerance, UE strength, UE ROM, FMC, GMC, memory, insight, safety , judgement , attention , sequencing , sensation , visual perceptual skills , R/L discrimination , task initiation , task termination , (R) attention, proprioception , coping skills , communication, and interpersonal skills  Occupational performance remains limited by fatigue, (R) hemiparesis, (R) visual deficits , risk for falls, and home environment  Family training/education will be required prior to D/C  Pt will continue to benefit from skilled acute rehab OT services to address above mentioned barriers and maximize functional independence in baseline areas of occupation to meet established treatment goals with overall decreased burden of care  Plan of care to continue to focus on ADL Retraining , LB Dressing, UB dressing, George Dressing Techniques, Functional Cognition, Functional Attention, Assessment of Cognitive function, Short Term memory, MOCA, ACLS, Standing tolerance, Standing balance , UE NMR right, midline awareness, Fine motor coordination, Gross motor coordination, Fine motor strengthening , Gross motor strengthening , R attention, Visual perceptual skills, Visual scanning, DME training/education, Family training/education, Energy conservation training/education, healthy coping education, Leisure and social pursuits, community re-integration, sitting balance, and Core/trunk control/strengthening   Goals for the upcoming week are: increasing out of bed tolerance and sitting balance to facilitate ADL completion  Anticipate Re-team at this time  Paticia Hammans           Speech Therapy:  Mode of Communication: Verbal  Speech/Language: Expressive Aphasia (expressive > receptive aphasia)  Cognition: Exceptions to WNL  Cognition: Decreased Memory, Decreased Executive Functions, Decreased Attention, Decreased Comprehension, Decreased Safety, Impulsive  Orientation: Person, Place, Situation  Swallowing: Within Defined Limits  Diet Recommendations: Regular Diet, Thin  Discharge Recommendations:  (pending pt progress)  Speech/language evaluation was completed on initial evaluation where pt completed the Bedside WAB in which overall bedside aphasia score deems pt to demonstrate severely impaired language deficits  Additionally, pt also demonstrates Broca's type aphasia as per Bedside Aphasia Classification  Overall, pt is presenting with moderate to sever receptive language deficits and severe expressive language/communication deficits  Pt's speech is characterized by anomia, presence of paraphasias and use of neologisms as pt does present with some components of apraxia of speech  Pt at times able to elicit functional phrases and short sentences but is most often limited by decreased verbal expression  Regarding comprehension, pt benefits from the use of yes/no questions, along with direct commands/instructions  In additional to the above language deficits, pt presents with cognitive linguistic deficits with barriers presenting as deficits in the following areas: attention, processing, STM recall, working memory, LTM recall, orientation, problem solving, executive functions, safety awareness and insight into deficits  Pt is currently functioning at mod assist for comprehension, max assist for expression, max assist for problem solving and max to mod assist for memory  Levels of fatigue are also noted to impact both functional communication and cognitive linguistic skills  Additionally, pt was evaluated for swallow function where he is presenting with functional oropharyngeal swallow skills across baseline diet of regular solids/thin liquids, therefore no further dysphagia therapy is warranted at this time  However, pt is recommended for further skilled SLP services during acute rehab stay in order to improve both expressive/receptive language skills and to maximize overall functional communication abilities         Nursing Notes:  Appetite: Fair  Diet Type: Cardiac, Diabetic                      Diet Patient/Family Education Complete: No                            Bladder: Catheter  Catheter Type: Cortez  Bladder Patient/Family Education: No  Bowel:  Incontinent     Bowel Patient/Family Education: No  Pain Location/Orientation: Location: Back  Pain Score: 0                       Hospital Pain Intervention(s): Medication (See MAR)  Pain Patient/Family Education: Yes  Medication Management/Safety  Injectable:  (heparin- will not need at d/c)  Safe Administration: No  Reason for non-safe administration: will need assist at d/c  Medication Patient/Family Education Complete: No    Pt with change in mental staus and Acute multifocal ischemic strokes Occurred in different arterial distributions IVY was negative for thrombus/PFO Felt not to be vasculitis and negative by a-gram 5/4/23 Continue ASA/Brilinta Continue statin Left ICA stenosis- Was noted to have all of the CVAs in last month have been in left anterior circulation S/p PTA/stent 5/4/23 Recheck carotid doppler 6 weeks and see NS Continue AP/statin Right ICA stenosis- To followup with Vasc surgery as OP Continue AP/statin LOOP recorder  Was placed 3/2019 Neuro wants it replaced = for OP to Cardiology  Leukocytosis- Had no fevers CXR and Procal were normal HTN-Home:  Toprol 100mg qd/Norvasc 10mg qd/Losartan 50mg qd/HCTZ 25mg qd/Hydralazine 25mg TID Here:  Toprol 100mg qd/Losartan 50mg qd/HCTZ 12 5mg qd HCTZ was decreased to 12 5mg qd today before transfer here to [facility]; stopped  starting 5/16/23 Pre DM-HbA1C 5 8 Takes Metformin at home but currently not on it in hospital-Wife didn't want him to have Accuchecks noting that he was pre-DM so they were stopped in hospital   The BSs had been very normal in acute anyway Will watch fastings with BMPs For DM diet which he had not been on in acute  Dr Purvi Amado spoke with him about that and he was ok with that plan Depression-Continue celexa as at home Memory loss- Continue Aricept as at home He has had memory issues since his CVA in 2019 Urine retention-Has jo- Continue Flomax   Chronic LBP- MRI showed advanced multilevel spondylosis, slightly progressed on the right at L2-3 compared to the prior study but otherwise stable  AAA- Found on L-spine MRI Measures 3 4 cm  Hyponatremia-Na 132 Abnormal creatinine-Stop HCTZ  Will give 1 liter NSS x 1 l  BMP AM and hold Cozaar until see labs tomorrow 5/16/3  Pt has jo- is inc BM  Pt requires alarms for safety  This week we will encourage independence with ADLs  We will monitor labs and vital signs  WE will educate pt/family about repositioning to prevent skin breakdown  We will assist w repositioning and perform routine skin checks  We will monitor for adequate pain control  We will monitor for constipation and medicate pt as ordered  We will increase safety awareness and keep pt free from falls  Case Management:     Discharge Planning  Living Arrangements: Lives w/ Spouse/significant other  Support Systems: Spouse/significant other  Assistance Needed: Unknown  Type of Current Residence: Other (Comment)  Current Home Care Services: No  Pt admitted s/pt stroke and has  hx of stroke  Pt resides with spouse who works but is supportive  Pt having episodes of lethargy and may be placed on a 900 minute program  Following for continued stay review and dc planning needs  Is the patient actively participating in therapies? yes  List any modifications to the treatment plan:     Barriers Interventions   Jo  Voiding trial ordered   Lethargy, activity tolerance Energy conservation education   Aphasia,  Speech therapy    Coordination, right sided neglect, balance Cueing to attend to the right, therapy exercises   Decreased caregiver support Progress to safest goals for dc vs snf     Is the patient making expected progress toward goals?  yes  List any update or changes to goals:     Medical Goals: Patient will be medically stable for discharge to [facility] envrionment upon completion of rehab program and Patient will be able to manage medical conditions and comorbid conditions with medications and follow up upon completion of rehab program    Weekly Team Goals:   Rehab Team Goals  ADL Team Goal: Patient will require assist with ADLs with least restrictive device upon completion of rehab program  Transfer Team Goal: Patient will require assist with transfers with least restrictive device upon completion of rehab program  Locomotion Team Goal: Patient will require assist with locomotion with least restrictive device upon completion of rehab program  Cognitive Team Goal: Patient will require supervision for basic and complex tasks upon completion of rehab program    Discussion: pt presents with the above barriers and is a angela lift for nursing and an assist of two to three with therapy  Pt is sleeping a lot and per wife pt was oob in the recliner all day on the acute care side  Pt currently has a two hour max activity tolerance  Pt is max a for adls  Overall language is mod to severe, expressive and receptive  pts right side field cut causes concern with bed mobility  Goals are min a to mod a with all functional mobility and is expected to need 24 hr assist /care on dc  Anticipated Discharge Date:  reteam [facility] Team Members Present: The following team members are supervising care for this patient and were present during this Weekly Team Conference      Physician: Dr Kellen Byrd MD  : Ileana Hammans, MSW  Registered Nurse: Oswaldo Cartagena RN  Physical Therapist: Veronica Polk DPT  Occupational Therapist: Jona Martino MS, OTR/L  Speech Therapist: Eduardo Martinez [address], [id number] [facility], [place], operated [facility]

## 2023-05-18 PROBLEM — F41.9 ANXIETY AND DEPRESSION: Status: ACTIVE | Noted: 2019-03-29

## 2023-05-18 PROBLEM — F32.A ANXIETY AND DEPRESSION: Status: ACTIVE | Noted: 2019-03-29

## 2023-05-18 LAB
ANION GAP SERPL CALCULATED.3IONS-SCNC: 2 MMOL/L (ref 4–13)
BUN SERPL-MCNC: 21 MG/DL (ref 5–25)
CALCIUM SERPL-MCNC: 9.7 MG/DL (ref 8.3–10.1)
CHLORIDE SERPL-SCNC: 109 MMOL/L (ref 96–108)
CO2 SERPL-SCNC: 26 MMOL/L (ref 21–32)
CREAT SERPL-MCNC: 1.02 MG/DL (ref 0.6–1.3)
GFR SERPL CREATININE-BSD FRML MDRD: 77 ML/MIN/1.73SQ M
GLUCOSE P FAST SERPL-MCNC: 95 MG/DL (ref 65–99)
GLUCOSE SERPL-MCNC: 95 MG/DL (ref 65–140)
POTASSIUM SERPL-SCNC: 4.3 MMOL/L (ref 3.5–5.3)
SODIUM SERPL-SCNC: 137 MMOL/L (ref 135–147)

## 2023-05-18 PROCEDURE — 97530 THERAPEUTIC ACTIVITIES: CPT

## 2023-05-18 PROCEDURE — 80048 BASIC METABOLIC PNL TOTAL CA: CPT | Performed by: NURSE PRACTITIONER

## 2023-05-18 PROCEDURE — 97112 NEUROMUSCULAR REEDUCATION: CPT

## 2023-05-18 PROCEDURE — 99233 SBSQ HOSP IP/OBS HIGH 50: CPT

## 2023-05-18 PROCEDURE — 97535 SELF CARE MNGMENT TRAINING: CPT

## 2023-05-18 PROCEDURE — 99232 SBSQ HOSP IP/OBS MODERATE 35: CPT | Performed by: INTERNAL MEDICINE

## 2023-05-18 PROCEDURE — 92507 TX SP LANG VOICE COMM INDIV: CPT

## 2023-05-18 RX ORDER — ACETAMINOPHEN 325 MG/1
975 TABLET ORAL 3 TIMES DAILY PRN
Status: DISCONTINUED | OUTPATIENT
Start: 2023-05-18 | End: 2023-06-06 | Stop reason: HOSPADM

## 2023-05-18 RX ORDER — TRAZODONE HYDROCHLORIDE 50 MG/1
50 TABLET ORAL
Status: DISCONTINUED | OUTPATIENT
Start: 2023-05-18 | End: 2023-05-30

## 2023-05-18 RX ADMIN — HEPARIN SODIUM 5000 UNITS: 5000 INJECTION INTRAVENOUS; SUBCUTANEOUS at 13:01

## 2023-05-18 RX ADMIN — TICAGRELOR 90 MG: 90 TABLET ORAL at 08:40

## 2023-05-18 RX ADMIN — ACETAMINOPHEN 975 MG: 325 TABLET ORAL at 05:50

## 2023-05-18 RX ADMIN — METOPROLOL SUCCINATE 100 MG: 100 TABLET, EXTENDED RELEASE ORAL at 08:39

## 2023-05-18 RX ADMIN — TICAGRELOR 90 MG: 90 TABLET ORAL at 22:06

## 2023-05-18 RX ADMIN — TRAZODONE HYDROCHLORIDE 50 MG: 50 TABLET ORAL at 22:06

## 2023-05-18 RX ADMIN — HEPARIN SODIUM 5000 UNITS: 5000 INJECTION INTRAVENOUS; SUBCUTANEOUS at 22:06

## 2023-05-18 RX ADMIN — ASPIRIN 81 MG: 81 TABLET, COATED ORAL at 08:40

## 2023-05-18 RX ADMIN — SENNOSIDES, DOCUSATE SODIUM 2 TABLET: 8.6; 5 TABLET ORAL at 17:20

## 2023-05-18 RX ADMIN — HEPARIN SODIUM 5000 UNITS: 5000 INJECTION INTRAVENOUS; SUBCUTANEOUS at 05:51

## 2023-05-18 RX ADMIN — CITALOPRAM HYDROBROMIDE 20 MG: 20 TABLET ORAL at 08:40

## 2023-05-18 RX ADMIN — PANTOPRAZOLE SODIUM 20 MG: 20 TABLET, DELAYED RELEASE ORAL at 05:51

## 2023-05-18 RX ADMIN — DONEPEZIL HYDROCHLORIDE 5 MG: 5 TABLET ORAL at 17:20

## 2023-05-18 RX ADMIN — ATORVASTATIN CALCIUM 40 MG: 40 TABLET, FILM COATED ORAL at 08:39

## 2023-05-18 RX ADMIN — TAMSULOSIN HYDROCHLORIDE 0.4 MG: 0.4 CAPSULE ORAL at 17:20

## 2023-05-18 RX ADMIN — LOSARTAN POTASSIUM 50 MG: 50 TABLET, FILM COATED ORAL at 10:16

## 2023-05-18 RX ADMIN — DONEPEZIL HYDROCHLORIDE 5 MG: 5 TABLET ORAL at 08:40

## 2023-05-18 NOTE — PROGRESS NOTES
Internal Medicine Progress Note  Patient: Renetta Bingham  Age/sex: 58 y o  male  Medical Record #: [id number]      ASSESSMENT/PLAN: (Interval History)  Renetta Bingham is seen and examined and management for following issues:    Acute multifocal ischemic strokes  • Occurred in different arterial distributions  • IVY was negative for thrombus/PFO  • Felt not to be vasculitis and negative by a-gram 5/4/23  • Continue ASA/Brilinta/statin     Left ICA stenosis  • Was noted to have all of the CVAs in last month have been in left anterior circulation  • S/p PTA/stent 5/4/23  • Recheck carotid doppler 6 weeks and see NS  • Continue AP/statin     Right ICA stenosis  • To followup with Vasc surgery as OP  • Continue AP/statin     LOOP recorder  • Was placed 3/2019  • Neuro wants it to be replaced = for OP to Cardiology     Leukocytosis  • Had no fevers  • CXR and Procal were normal  • CBC today 5/18 was misplaced and will obtain tomorrow 5/19     HTN  • Home:  Toprol 100mg qd/Norvasc 10mg qd/Losartan 50mg qd/HCTZ 25mg qd/Hydralazine 25mg TID  • Here:  Toprol 100mg qd/Losartan 50mg qd  • HCTZ was decreased to 12 5mg qd before transfer here to [facility]; stopped beginning 5/16/23     Pre DM  • HbA1C 5 8  • Takes Metformin at home but currently not on it in hospital  • Wife didn't want him to have Accuchecks noting that he was pre-DM so they were stopped in hospital   The BSs had been very normal in acute anyway  • Will watch fastings with BMPs and maintain DM diet     Depression  • Continue Celexa as at home  • Wife thinks he is much more depressed than usual = I relayed this to Dr Blank Cruz     Memory loss  • Continue Aricept as at home  • He has had memory issues since his CVA in 2019     Urine retention  • Has jo placed 5/8/23  • VT per PMR  • Continue Flomax     Chronic LBP  • MRI showed advanced multilevel spondylosis, slightly progressed on the right at L2-3 compared to the prior study but otherwise stable     • Pain management per PMR     AAA  • Found on L-spine MRI  • Measures 3 4 cm  • OP followup     Hyponatremia  • Stopped HCTZ 5/16/23  • Resolved off of HCTZ and had been given IVF     Abnormal creatinine  • Stopped HCTZ  • On 5/15/23, gave 1 liter NSS x 1  • Now back to baseline        Discharge date:  Reteam       The above assessment and plan was reviewed and updated as determined by my evaluation of the patient on 5/18/2023      Labs:   Results from last 7 days   Lab Units 05/15/23  0642 05/13/23  0559   WBC Thousand/uL 14 23* 12 84*   HEMOGLOBIN g/dL 13 3 12 8   HEMATOCRIT % 37 9 36 5   PLATELETS Thousands/uL 352 311     Results from last 7 days   Lab Units 05/18/23  0606 05/16/23  0549   SODIUM mmol/L 137 133*   POTASSIUM mmol/L 4 3 3 9   CHLORIDE mmol/L 109* 106   CO2 mmol/L 26 25   BUN mg/dL 21 24   CREATININE mg/dL 1 02 1 03   CALCIUM mg/dL 9 7 9 1                   Review of Scheduled Meds:  Current Facility-Administered Medications   Medication Dose Route Frequency Provider Last Rate   • acetaminophen  975 mg Oral TID PRN Maggy Garza MD     • aspirin  81 mg Oral Daily Elvai Pickup, DO     • atorvastatin  40 mg Oral Daily Elvia Pickup, DO     • bisacodyl  10 mg Rectal Daily PRN Elvia Pickup, DO     • calcium carbonate  1,000 mg Oral Daily PRN Elvia Pickup, DO     • citalopram  20 mg Oral Daily Elvia Pickup, DO     • donepezil  5 mg Oral BID Elvia Pickup, DO     • heparin (porcine)  5,000 Units Subcutaneous [facility] Elvia Pickup, DO     • lidocaine  1 patch Topical Daily Elvia Pickup, DO     • lidocaine   Topical Q4H PRN Maggy Garza MD     • losartan  50 mg Oral Daily ERIC Reza     • methocarbamol  500 mg Oral Q6H PRN Elvia Pickup, DO     • metoprolol succinate  100 mg Oral Daily Elvia Pickup, DO     • ondansetron  4 mg Oral Q6H PRN Elvia Pickup, DO     • oxyCODONE  5 mg Oral Q6H PRN Elvia Pickup, DO     • oxyCODONE  2 5 mg Oral Q6H PRN Elvia Pickup, DO     • pantoprazole  20 mg Oral Early Morning Sharlie Apley, DO     • polyethylene glycol  17 g Oral QAM Sharlie Apley, DO     • senna  2 tablet Oral Daily PRN Sharlie Apley, DO     • senna-docusate sodium  2 tablet Oral BID Sharlie Apley, DO     • tamsulosin  0 4 mg Oral Daily With Dinner Sharlie Apley, DO     • ticagrelor  90 mg Oral Q12H [address] Sharlie Apley, DO         Subjective/ HPI: Patient seen and examined  Patients overnight issues or events were reviewed with nursing or staff during rounds or morning huddle session  New or overnight issues include the following:     No new or overnight issues  Offers no complaints    ROS:   A 10 point ROS was performed; negative except as noted above  Imaging:     No orders to display       *Labs /Radiology studies reviewed  *Medications reviewed and reconciled as needed  *Please refer to order section for additional ordered labs studies  *Case discussed with primary attending during morning huddle case rounds    Physical Examination:  Vitals:   Vitals:    05/17/23 1443 05/17/23 2106 05/18/23 0544 05/18/23 0839   BP: 148/69 (!) 185/78 138/65 133/61   BP Location: Right arm Right arm Left arm Right arm   Pulse: 78 66 60 65   Resp: 19  18    Temp: 97 8 °F (36 6 °C) 98 2 °F (36 8 °C) 97 6 °F (36 4 °C)    TempSrc: Oral Oral Oral    SpO2: 100% 97% 95%    Weight:           General Appearance: no distress, conversive  HEENT:  External ear normal   Nose normal w/o drainage  Mucous membranes are moist  Oropharynx is clear  Conjunctiva clear w/o icterus or redness  Neck:  Supple, normal ROM  Lungs: BBS without crackles/wheeze/rhonchi; respirations unlabored with normal inspiratory/expiratory effort  No retractions noted  On RA  CV: regular rate and rhythm; no rubs/murmurs/gallops, PMI normal   ABD: Abdomen is soft  Bowel sounds all quadrants  Nontender with no distention  EXT: no edema  Skin: normal turgor, normal texture, no rashes  Psych: affect normal, mood normal  Neuro: AA  Expressive>receptive aphasia    The above physical exam was reviewed and updated as determined by my evaluation of the patient on 5/18/2023  Invasive Devices     Peripheral Intravenous Line  Duration           Peripheral IV 05/16/23 Dorsal (posterior); Left Hand 2 days                   VTE Pharmacologic Prophylaxis: Heparin  Code Status: Level 1 - Full Code  Current Length of Stay: 6 day(s)      Total time spent:  30 minutes with more than 50% spent counseling/coordinating care  Counseling includes discussion with patient re: progress  and discussion with patient of his/her current medical state/information  Coordination of patient's care was performed in conjunction with primary service  Time invested included review of patient's labs, vitals, and management of their comorbidities with continued monitoring  In addition, this patient was discussed with medical team including physician and advanced extenders  The care of the patient was extensively discussed and appropriate treatment plan was formulated unique for this patient  Medical decision making for the day was made by supervising physician unless otherwise noted in their attestation statement  ** Please Note:  voice to text software may have been used in the creation of this document   Although proof errors in transcription or interpretation are a potential of such software**

## 2023-05-18 NOTE — PLAN OF CARE
Problem: PAIN - ADULT  Goal: Verbalizes/displays adequate comfort level or baseline comfort level  Description: Interventions:  - Encourage patient to monitor pain and request assistance  - Assess pain using appropriate pain scale  - Administer analgesics based on type and severity of pain and evaluate response  - Implement non-pharmacological measures as appropriate and evaluate response  - Consider cultural and social influences on pain and pain management  - Notify physician/advanced practitioner if interventions unsuccessful or patient reports new pain  Outcome: Progressing     Problem: INFECTION - ADULT  Goal: Absence or prevention of progression during hospitalization  Description: INTERVENTIONS:  - Assess and monitor for signs and symptoms of infection  - Monitor lab/diagnostic results  - Monitor all insertion sites, i e  indwelling lines, tubes, and drains  - Monitor endotracheal if appropriate and nasal secretions for changes in amount and color  - [employer] appropriate cooling/warming therapies per order  - Administer medications as ordered  - Instruct and encourage patient and family to use good hand hygiene technique  - Identify and instruct in appropriate isolation precautions for identified infection/condition  Outcome: Progressing  Goal: Absence of fever/infection during neutropenic period  Description: INTERVENTIONS:  - Monitor WBC    Outcome: Progressing     Problem: SAFETY ADULT  Goal: Patient will remain free of falls  Description: INTERVENTIONS:  - Educate patient/family on patient safety including physical limitations  - Instruct patient to call for assistance with activity   - Consult OT/PT to assist with strengthening/mobility   - Keep Call bell within reach  - Keep bed low and locked with side rails adjusted as appropriate  - Keep care items and personal belongings within reach  - Initiate and maintain comfort rounds  - Make Fall Risk Sign visible to staff  - Offer Toileting every 2 Hours, in advance of need  - Initiate/Maintain bed/chair alarm  - Obtain necessary fall risk management equipment: alarms, fall mats  - Apply yellow socks and bracelet for high fall risk patients  - Consider moving patient to room near nurses station  Outcome: Progressing  Goal: Maintain or return to baseline ADL function  Description: INTERVENTIONS:  -  Assess patient's ability to carry out ADLs; assess patient's baseline for ADL function and identify physical deficits which impact ability to perform ADLs (bathing, care of mouth/teeth, toileting, grooming, dressing, etc )  - Assess/evaluate cause of self-care deficits   - Assess range of motion  - Assess patient's mobility; develop plan if impaired  - Assess patient's need for assistive devices and provide as appropriate  - Encourage maximum independence but intervene and supervise when necessary  - Involve family in performance of ADLs  - Assess for home care needs following discharge   - Consider OT consult to assist with ADL evaluation and planning for discharge  - Provide patient education as appropriate  Outcome: Progressing  Goal: Maintains/Returns to pre admission functional level  Description: INTERVENTIONS:  - Perform BMAT or MOVE assessment daily    - Set and communicate daily mobility goal to care team and patient/family/caregiver  - Collaborate with rehabilitation services on mobility goals if consulted  - Perform Range of Motion 3 times a day  - Reposition patient every 2 hours    - Dangle patient 3 times a day  - Stand patient 3 times a day  - Ambulate patient 3 times a day  - Out of bed to chair 3 times a day   - Out of bed for meals 3 times a day  - Out of bed for toileting  - Record patient progress and toleration of activity level   Outcome: Progressing     Problem: DISCHARGE PLANNING  Goal: Discharge to home or other facility with appropriate resources  Description: INTERVENTIONS:  - Identify barriers to discharge w/patient and caregiver  - Arrange for needed discharge resources and transportation as appropriate  - Identify discharge learning needs (meds, wound care, etc )  - Arrange for interpretive services to assist at discharge as needed  - Refer to Case Management Department for coordinating discharge planning if the patient needs post-hospital services based on physician/advanced practitioner order or complex needs related to functional status, cognitive ability, or social support system  Outcome: Progressing     Problem: Prexisting or High Potential for Compromised Skin Integrity  Goal: Skin integrity is maintained or improved  Description: INTERVENTIONS:  - Identify patients at risk for skin breakdown  - Assess and monitor skin integrity  - Assess and monitor nutrition and hydration status  - Monitor labs   - Assess for incontinence   - Turn and reposition patient  - Assist with mobility/ambulation  - Relieve pressure over bony prominences  - Avoid friction and shearing  - Provide appropriate hygiene as needed including keeping skin clean and dry  - Evaluate need for skin moisturizer/barrier cream  - Collaborate with interdisciplinary team   - Patient/family teaching  - Consider wound care consult   Outcome: Progressing     Problem: MOBILITY - ADULT  Goal: Maintain or return to baseline ADL function  Description: INTERVENTIONS:  -  Assess patient's ability to carry out ADLs; assess patient's baseline for ADL function and identify physical deficits which impact ability to perform ADLs (bathing, care of mouth/teeth, toileting, grooming, dressing, etc )  - Assess/evaluate cause of self-care deficits   - Assess range of motion  - Assess patient's mobility; develop plan if impaired  - Assess patient's need for assistive devices and provide as appropriate  - Encourage maximum independence but intervene and supervise when necessary  - Involve family in performance of ADLs  - Assess for home care needs following discharge   - Consider OT consult to assist with ADL evaluation and planning for discharge  - Provide patient education as appropriate  Outcome: Progressing     Problem: Nutrition/Hydration-ADULT  Goal: Nutrient/Hydration intake appropriate for improving, restoring or maintaining nutritional needs  Description: Monitor and assess patient's nutrition/hydration status for malnutrition  Collaborate with interdisciplinary team and initiate plan and interventions as ordered  Monitor patient's weight and dietary intake as ordered or per policy  Utilize nutrition screening tool and intervene as necessary  Determine patient's food preferences and provide high-protein, high-caloric foods as appropriate       INTERVENTIONS:  - Monitor oral intake, urinary output, labs, and treatment plans  - Assess nutrition and hydration status and recommend course of action  - Evaluate amount of meals eaten  - Assist patient with eating if necessary   - Allow adequate time for meals  - Recommend/ encourage appropriate diets, oral nutritional supplements, and vitamin/mineral supplements  - Order, calculate, and assess calorie counts as needed  - Recommend, monitor, and adjust tube feedings and TPN/PPN based on assessed needs  - Assess need for intravenous fluids  - Provide specific nutrition/hydration education as appropriate  - Include patient/family/caregiver in decisions related to nutrition  Outcome: Progressing

## 2023-05-18 NOTE — NURSING NOTE
Pt last bladder scan 138  Offered urinal, repositioned ran water for pt  No c/o pain, or discomfort  Pt sleeping in bed at present time

## 2023-05-18 NOTE — PLAN OF CARE
Problem: PAIN - ADULT  Goal: Verbalizes/displays adequate comfort level or baseline comfort level  Description: Interventions:  - Encourage patient to monitor pain and request assistance  - Assess pain using appropriate pain scale  - Administer analgesics based on type and severity of pain and evaluate response  - Implement non-pharmacological measures as appropriate and evaluate response  - Consider cultural and social influences on pain and pain management  - Notify physician/advanced practitioner if interventions unsuccessful or patient reports new pain  Outcome: Progressing     Problem: INFECTION - ADULT  Goal: Absence or prevention of progression during hospitalization  Description: INTERVENTIONS:  - Assess and monitor for signs and symptoms of infection  - Monitor lab/diagnostic results  - Monitor all insertion sites, i e  indwelling lines, tubes, and drains  - Monitor endotracheal if appropriate and nasal secretions for changes in amount and color  - [place] appropriate cooling/warming therapies per order  - Administer medications as ordered  - Instruct and encourage patient and family to use good hand hygiene technique  - Identify and instruct in appropriate isolation precautions for identified infection/condition  Outcome: Progressing  Goal: Absence of fever/infection during neutropenic period  Description: INTERVENTIONS:  - Monitor WBC    Outcome: Progressing     Problem: SAFETY ADULT  Goal: Patient will remain free of falls  Description: INTERVENTIONS:  - Educate patient/family on patient safety including physical limitations  - Instruct patient to call for assistance with activity   - Consult OT/PT to assist with strengthening/mobility   - Keep Call bell within reach  - Keep bed low and locked with side rails adjusted as appropriate  - Keep care items and personal belongings within reach  - Initiate and maintain comfort rounds  - Make Fall Risk Sign visible to staff  - Apply yellow socks and bracelet for high fall risk patients  - Consider moving patient to room near nurses station  Outcome: Progressing  Goal: Maintain or return to baseline ADL function  Description: INTERVENTIONS:  -  Assess patient's ability to carry out ADLs; assess patient's baseline for ADL function and identify physical deficits which impact ability to perform ADLs (bathing, care of mouth/teeth, toileting, grooming, dressing, etc )  - Assess/evaluate cause of self-care deficits   - Assess range of motion  - Assess patient's mobility; develop plan if impaired  - Assess patient's need for assistive devices and provide as appropriate  - Encourage maximum independence but intervene and supervise when necessary  - Involve family in performance of ADLs  - Assess for home care needs following discharge   - Consider OT consult to assist with ADL evaluation and planning for discharge  - Provide patient education as appropriate  Outcome: Progressing  Goal: Maintains/Returns to pre admission functional level  Description: INTERVENTIONS:  - Perform BMAT or MOVE assessment daily    - Set and communicate daily mobility goal to care team and patient/family/caregiver     - Collaborate with rehabilitation services on mobility goals if consulted  - Out of bed for toileting  - Record patient progress and toleration of activity level   Outcome: Progressing     Problem: DISCHARGE PLANNING  Goal: Discharge to home or other facility with appropriate resources  Description: INTERVENTIONS:  - Identify barriers to discharge w/patient and caregiver  - Arrange for needed discharge resources and transportation as appropriate  - Identify discharge learning needs (meds, wound care, etc )  - Arrange for interpretive services to assist at discharge as needed  - Refer to Case Management Department for coordinating discharge planning if the patient needs post-hospital services based on physician/advanced practitioner order or complex needs related to functional status, cognitive ability, or social support system  Outcome: Progressing     Problem: Prexisting or High Potential for Compromised Skin Integrity  Goal: Skin integrity is maintained or improved  Description: INTERVENTIONS:  - Identify patients at risk for skin breakdown  - Assess and monitor skin integrity  - Assess and monitor nutrition and hydration status  - Monitor labs   - Assess for incontinence   - Turn and reposition patient  - Assist with mobility/ambulation  - Relieve pressure over bony prominences  - Avoid friction and shearing  - Provide appropriate hygiene as needed including keeping skin clean and dry  - Evaluate need for skin moisturizer/barrier cream  - Collaborate with interdisciplinary team   - Patient/family teaching  - Consider wound care consult   Outcome: Progressing     Problem: MOBILITY - ADULT  Goal: Maintain or return to baseline ADL function  Description: INTERVENTIONS:  -  Assess patient's ability to carry out ADLs; assess patient's baseline for ADL function and identify physical deficits which impact ability to perform ADLs (bathing, care of mouth/teeth, toileting, grooming, dressing, etc )  - Assess/evaluate cause of self-care deficits   - Assess range of motion  - Assess patient's mobility; develop plan if impaired  - Assess patient's need for assistive devices and provide as appropriate  - Encourage maximum independence but intervene and supervise when necessary  - Involve family in performance of ADLs  - Assess for home care needs following discharge   - Consider OT consult to assist with ADL evaluation and planning for discharge  - Provide patient education as appropriate  Outcome: Progressing  Goal: Maintains/Returns to pre admission functional level  Description: INTERVENTIONS:  - Perform BMAT or MOVE assessment daily    - Set and communicate daily mobility goal to care team and patient/family/caregiver     - Collaborate with rehabilitation services on mobility goals if consulted  - Out of bed for toileting  - Record patient progress and toleration of activity level   Outcome: Progressing     Problem: Nutrition/Hydration-ADULT  Goal: Nutrient/Hydration intake appropriate for improving, restoring or maintaining nutritional needs  Description: Monitor and assess patient's nutrition/hydration status for malnutrition  Collaborate with interdisciplinary team and initiate plan and interventions as ordered  Monitor patient's weight and dietary intake as ordered or per policy  Utilize nutrition screening tool and intervene as necessary  Determine patient's food preferences and provide high-protein, high-caloric foods as appropriate       INTERVENTIONS:  - Monitor oral intake, urinary output, labs, and treatment plans  - Assess nutrition and hydration status and recommend course of action  - Evaluate amount of meals eaten  - Assist patient with eating if necessary   - Allow adequate time for meals  - Recommend/ encourage appropriate diets, oral nutritional supplements, and vitamin/mineral supplements  - Order, calculate, and assess calorie counts as needed  - Recommend, monitor, and adjust tube feedings and TPN/PPN based on assessed needs  - Assess need for intravenous fluids  - Provide specific nutrition/hydration education as appropriate  - Include patient/family/caregiver in decisions related to nutrition  Outcome: Progressing

## 2023-05-18 NOTE — PROGRESS NOTES
"   05/18/23 1100   Pain Assessment   Pain Assessment Tool 0-10   Pain Score No Pain   Comprehension   Comprehension (FIM) 3 - Understands basic info/conversation 50-74% of time   Expression   Expression (FIM) 2 - Uses only simple expressions or gestures (waves, hello)   Social Interaction   Social Interaction (FIM) 4 - Interacts 75-89% of time   Problem Solving   Problem solving (FIM) 2 - Solves basic problems 25-49% of time   Memory   Memory (FIM) 2 - Recognizes, recalls/performs 25-49%   Speech/Language/Cognition Assessmetn   Treatment Assessment Pt lying in bed upon SLP entering  After SLP introducing self and title, pt agreeable to skilled ST session  Session began with rapport building as this also elicits naming/word finding and expressive language practice as it pertains to the patient's bio, demographic, and personal information  Prior to the session, the SLP gathered a list of family names and relationships to ensure accuracy  Patient able to successfully independently name his son and daughter's name (with increased processing time)  The remaining info required a combination of phonemic cues, carrier phrase/phrase completions, phonetic placement cue, multiple choice in Fo2  Next, the pt was provided with a list of written words, and he was asked to sort them into one of four possible categories  He did not have his galsses, thus SLP wrote the four options in large print, and then presented each listed word one at a time in large print  Pt unable to comprehend the task instructions, and required verbal presentation paired with visual cues (tapping each category option written word)  Even so, pt required maxA to complete successfully   He required again a combination of cueing (dropping choices from four to two, second readings/presentations of the options per word, semantic cues as they relate to the stimuli--e g , pt demonstrating poor comprehension of the word \"beans,\" so SLP providing features and types " of beans)  Finally, pt engaging in a phrase completion task  SLP read the phrase, pt provided the final word  23/34 independently, required new phrase, second attempt, phonemic cue, phonetic placement cue to perform at 33/34  Given today's performance, it is observed that the patient most benefits from having increased processing time, and when given a carrier phrase  He does not show significant benefit from initial phoneme only, or semantic feature as it pertains to his expressive language  He does show benefit in semantic cues as it relates to his receptive language  It is recommended the patient continue to receive skilled ST while at the [facility] to further identify efficient strategies to aid in expressive and receptive language skills in order to reduce caregiver and family burden at time of D/C  SLP Therapy Minutes   SLP Time In 1100   SLP Time Out 1200   SLP Total Time (minutes) 60   SLP Mode of treatment - Individual (minutes) 60   SLP Mode of treatment - Concurrent (minutes) 0   SLP Mode of treatment - Group (minutes) 0   SLP Mode of treatment - Co-treat (minutes) 0   SLP Mode of Treatment - Total time(minutes) 60 minutes   SLP Cumulative Minutes 330   Therapy Time missed   Time missed?  No

## 2023-05-18 NOTE — PROGRESS NOTES
"OT Daily Treatment Note       05/18/23 0700   Pain Assessment   Pain Assessment Tool 0-10   Pain Score No Pain   Restrictions/Precautions   Precautions Aphasia; Aspiration;Bed/chair alarms;Cognitive; Fall Risk;Impulsive;Supervision on toilet/commode;Visual deficit   Lifestyle   Autonomy \"I do not have to go\" referring to urgency to urinate   Eating   Type of Assistance Needed Physical assistance   Physical Assistance Level 76% or more   Comment completed self-feeding tasks with breakfast; pt had cereal on tray which he reports is one of his favorite meals to eat  pt attempted to scoop cereal but due to coordination and motor planning deficits, pt was observed putting fingers in cereal and spoon not being positioned correctly  OT then placed cereal on spoon and handed to pt to bring to mouth but pt would tip spoon and all the cereal fell off  OT had to physically feed pt cereal  There was a banana also on his tray which he was able to grab and bring to food  recc continuing to trial finger foods to inc independence with self-feeding tasks   Eating CARE Score 2   Oral Hygiene   Type of Assistance Needed Supervision;Verbal cues   Physical Assistance Level No physical assistance   Comment seated upright in bed, pt req complete setup of toothbrush  pt was able to brush teeth with RUE with min vc to slow down and decrease amount of pressure being placed on teeth  pt was able to brush all 4 quadrants of mouth with physical assistance  Oral Hygiene CARE Score 4   Shower/Bathe Self   Type of Assistance Needed Physical assistance   Physical Assistance Level 51%-75%   Comment SB bed level completed, pt req mod/max A for BLE, posterior bathing and thoroughness for UB as pt was able to functionally reach all parts of his UB but 2* dec motor planning/coordination, pt would drop the bath wipe or miss a body part he was trying to bathe  able to roll to facilitate posterior bathing     Shower/Bathe Self CARE Score 2   Bathing " Assessed Bath Style Sponge Bath   Positioning Seated;Supine   Upper Body Dressing   Type of Assistance Needed Physical assistance   Physical Assistance Level 25% or less   Comment to don over RUE only   Upper Body Dressing CARE Score 3   Lower Body Dressing   Type of Assistance Needed Physical assistance   Physical Assistance Level 51%-75%   Comment Assist to thread pants over BLE, can don over hips in supine via rolling and bridging   Lower Body Dressing CARE Score 2   Putting On/Taking Off Footwear   Type of Assistance Needed Physical assistance   Physical Assistance Level Total assistance   Putting On/Taking Off Footwear CARE Score 1   Roll Left and Right   Type of Assistance Needed Supervision;Verbal cues   Physical Assistance Level No physical assistance   Comment supv with vc to slow down when rolling as pt rolls so quickly/impuslively, he is at risk of rolling off bed  able to follow commands to slow down  Roll Left and Right CARE Score 4   Sit to Lying   Type of Assistance Needed Physical assistance   Physical Assistance Level Total assistance   Comment Ax 2 with 2nd person as CGA   Sit to Lying CARE Score 1   Lying to Sitting on Side of Bed   Type of Assistance Needed Physical assistance   Physical Assistance Level Total assistance   Comment A x 2 to sit at EOB with 2nd person providing CGA for trunk control   Lying to Sitting on Side of Bed CARE Score 1   Bed-Chair Transfer   Type of Assistance Needed Physical assistance   Physical Assistance Level Total assistance   Comment A x 3 SBT to both R and left from bed <> platform drop arm BSC  cues for hand/foot placement  pt presents with dec motor planning/coordination causing pt to sometimes push forward on SB instead of psuhing L and R     Chair/Bed-to-Chair Transfer CARE Score 1   Toileting Hygiene   Type of Assistance Needed Physical assistance   Physical Assistance Level Total assistance   Comment A x 3 for pull to stand from bedrail for toileting seated on platform drop arm [facility]  pt able to stand with Ax1 while other two people focus on CM  pt attempted to toilet but was unsuccessful  nursing has been notified  Toileting Hygiene CARE Score 1   Toilet Transfer   Type of Assistance Needed Physical assistance   Physical Assistance Level Total assistance   Comment A x 3 SBT to both R and left from bed <> platform drop arm [facility]  cues for hand/foot placement  pt presents with dec motor planning/coordination causing pt to sometimes push forward on SB instead of psuhing L and R  Toilet Transfer CARE Score 1   Cognition   Overall Cognitive Status Impaired   Arousal/Participation Cooperative   Attention Difficulty attending to directions   Orientation Level Oriented to person;Oriented to place;Oriented to situation   Memory Decreased short term memory;Decreased recall of recent events;Decreased recall of precautions   Following Commands Follows multistep commands with increased time or repetition   Activity Tolerance   Activity Tolerance Patient tolerated treatment well   Assessment   Treatment Assessment Pt participated in skilled OT session with focus on ADL retraining, functional transfer training, UE strengthening/ROM, endurance training, cognitive reorientation, compensatory technique education, continued education, energy conservation and activity engagement   See flowsheet for details of session and current functional status  Pt is limited by weakness, decreased ROM, impaired balance, decreased endurance, decreased coordination, increased fall risk, decreased ADLS, decreased IADLS, pain, decreased activity tolerance, decreased safety awareness, impaired judgement, decreased cognition, decreased sensation, decreased strength and visual deficits and requires skilled OT services to increase independence and safety with ADL completion in prep for DC location pending progress   Plan to continue ADL retraining, visual perceptual retraining, functional transfer training, UE strengthening/ROM, endurance training, cognitive reorientation, Pt/caregiver education, equipment evaluation/education, neuro re-ed, compensatory technique education, continued education, energy conservation and activity engagement  to address barriers mentioned above  Prognosis Fair   Problem List Decreased strength;Decreased endurance; Impaired balance;Decreased mobility; Decreased coordination;Decreased cognition;Decreased range of motion; Impaired judgement;Decreased safety awareness; Impaired vision; Impaired sensation; Impaired tone   Barriers to Discharge Inaccessible home environment;Decreased caregiver support   Plan   Treatment/Interventions ADL retraining;Functional transfer training; Therapeutic exercise; Endurance training;Patient/family training;Cognitive reorientation; Compensatory technique education   Progress Progressing toward goals   OT Therapy Minutes   OT Time In 0700   OT Time Out 0830   OT Total Time (minutes) 90   OT Mode of treatment - Individual (minutes) 90   OT Mode of treatment - Concurrent (minutes) 0   OT Mode of treatment - Group (minutes) 0   OT Mode of treatment - Co-treat (minutes) 0   OT Mode of Treatment - Total time(minutes) 90 minutes   OT Cumulative Minutes 415   Therapy Time missed   Time missed?  No

## 2023-05-18 NOTE — PROGRESS NOTES
Physical Medicine and Rehabilitation Progress Note  Brissa Carnes 58 y o  male MRN: [id number]  Unit/Bed#: -01 Encounter: [id number]      Assessment & Plan:     Decline in ADLs and mobility: Functional assessment - improving         FIM  Care Score  Admit Score Recent Score    Total assist  1-100% or 2p    Tot ADLs    Max assist 2-51-75%    Sub  To hygiene, bathing, LBD   Mod assist 3- 26-50%  Par  UBD   Min assist 3- 25% or < Par     CG assist 4  TA     Sup/Setup 4-5 Sup     Mod-I/Indep 6 MI      Transfers  Total assist  Total assist     Ambulation   Total assist  Total assist (30 ft mod assist x2)    Stairs   Total assist/NT      Goal: CGA-supervision for most ADLs and  for mobility  Major barriers:  Impaired cognition, speech, balance, deconditioning  Dispo: Home with [facility] 21+ days from admission      * Stroke [facility])  Assessment & Plan  5/18 -function slowly improving, neuro exam stable  5/16 - Function improving; neuro exam stable; did walk with 2 person assist and was OOB good amount of day; mood still frustrated at times and provided supportive counseling and deep breathing exercises; he does have some activity intolerance and will write for 900 min program if needed   5/16 Extended discussion with wife and she does feel overall he is doing better - less lethargic, improving coordination; she was somewhat concerned he was in bed more first few days of rehab but appears to be improving overall on my eval and discussions with therapies today; will continue to monitor neuro exam closely   - Sub-optimal fluid/oral intake at times - IVF by IM 5/15 > encourage fluids/intake and monitor > improving some   - Nutrition c/s - continue nutritional supp; wife reports pt prefers finger foods; ensure set-up/supervision PRN; continue SLP  Recent multifocal ischemic infarcts in different distributions of unclear etiology   · Imaging revealed multifocal strokes; underwent L ICA PTA/stenting 5/4 also hx of R ICA stenosis · Has loop recorder placed 2019 and neuro wants OP replacement of loop recorder - OP cards f/u   · CT of the chest abdomen pelvis without evidence of malignancy done on the last admission on 4/17  · A-gram not c/w vasculitis   · Thrombosis panel done in 04/18/2023 that was only abnl for AT3 that was low in the acute setting and needs to be repeated in future  Repeat OP thrombosis panel thru neuro or hematology  · Recent IVY with no PFO  · Previous TTE 04/18/23 showed ED of 55% and nl wall motion and mildly dilated L atrium  · Continue Brilinta and aspirin as well as statin for secondary stroke prophylaxis   · Continue Physical therapy, Occupational Therapy, speech therapy  · Monitor neuro exam closely       Bilateral carotid artery stenosis  Assessment & Plan  · Status post left ICA angioplasty and stent placement neurovascular service  · Per IM- S/p L ICA PTA/stent 5/4/23;  Recheck carotid doppler 6 weeks and see NS  · Continue Brilinta, aspirin and statin  · BP mgmt per IM here  · OP vasc sx follow-up      Anxiety and depression  Assessment & Plan  Anxiety, depression, significant insomnia with difficulty adjusting  Provided additional supportive counseling  Neuropsych c/s   Continue chronic home Celexa 20mg qday   Trial adding 50 mg trazodone at bedtime  -Monitor for signs and symptoms of excessive serotonin  -Monitor mood, efficacy, tolerance     Urinary retention  Assessment & Plan  Removed jo and began voiding trial -so far no significant voluntary output requiring straight catheterizations; continue voiding trial  - toileting program Q2-4h during day, Q4-6h overnight, record bladder scans Q4H, PVRs PRN, and voided outputs; PRN straight catheterization >450 cc; customize as indicated  - Monitor for infection   - Continue tamsulosin 0 4 mg with dinner      Leukocytosis  Assessment & Plan  · WBC up to 14 K on 5/15; no s/s of infection but remains at risk; pt afebrile  · Comgmt with IM   · Monitor for s/s "of infection or other etiologies; monitor vitals/labs     Hypertriglyceridemia  Assessment & Plan  Continue statin    Primary hypertension  Assessment & Plan  · Mgmt per IM:  hydrochlorothiazide 12 5 mg daily, metoprolol succinate 100 mg daily as well as Cozaar 50 mg daily  · Hctz may be stopped by IM     GERD (gastroesophageal reflux disease)  Assessment & Plan  Continue pantoprazole and as needed Tums    At risk for venous thromboembolism (VTE)  Assessment & Plan  SCDs, ambulation, and heparin       Abnormal laboratory test  Assessment & Plan  AT3 89% on 4/2023 lab per prior providers; can be low from recent stroke and not true AT3 def  - Recommend follow-up with neurology and repeat thrombosis panel as an outpatient     Hypokalemia  Assessment & Plan  · Remains improved 5/16    Chronic ischemic left MCA stroke  Assessment & Plan  · Recent left MCA stroke in the beginning of April at 4/16/2023 with aphasia and was empirically treated with Eliquis 5 mg twice daily as well as aspirin 81 mg due to embolic stroke of undetermined source per prior documentation  · Despite this we presented with additional strokes for this admission    History of tobacco use  Assessment & Plan  · Patient with a history of tobacco use yet quit in 2019  · Does not need nicotine patches    Prediabetes  Assessment & Plan  · Last hemoglobin A1c of 5 8  · Mgmt per IM during [facility]   · Started on consistent carbohydrate 3 diet here in addition to cardiac diet on admission to [facility]    Chronic low back pain  Assessment & Plan  · Adequate control  · Trial APAP TID PRN - discussed with pt   · Per prior provider \"Continue to progress and wean at this time  Had a discussion with the patient on arrival after reviewing notes from Dr José Pedraza, and review of PDMP patient was never previously on opioid therapy except for short course of tramadol  Here he has been on oxycodone 10 mg dosing for severe pain as well as Norco and oxycodone 5 mg    On admission spoke " "with patient and will change him to a oxycodone 2 5 mg for moderate pain and 5 mg for severe pain with the plan of overall weaning till he is no longer on this medication  No clear indication for chronic opioid therapy at this time\"  · Continue lidocaine patch and aqua K-pad but not in the same area at the same time    Memory deficits  Assessment & Plan  · Started on Aricept due to memory difficulties after stroke in 2019  · Currently on 5 mg twice daily  · Sees Dr Trice Noriega in outpatient neurology    Chronic ischemic right MCA stroke  Assessment & Plan  · Patient with history of right MCA stroke back in March 2019 status post thrombectomy  · Had loop recorder placement without overt arrhythmia and was on Plavix 75 mg at that time  Also with known severe right M1 stenosis  · See \"Stroke\" mgmt above         Other Medical Issues:  • Monitor for     Follow-up providers and other issues to be followed up after discharge  PCP  Neuro  Cards  Vas Sx     CODE: Level 1: Full Code    Restrictions include: Fall precautions    Objective:     Allergies per EMR  Diagnostic Studies: Reviewed, no new imaging  No orders to display     See above as well    Laboratory: Labs reviewed  Results from last 7 days   Lab Units 05/15/23  0642 05/13/23  0559   HEMOGLOBIN g/dL 13 3 12 8   HEMATOCRIT % 37 9 36 5   WBC Thousand/uL 14 23* 12 84*     Results from last 7 days   Lab Units 05/18/23  0606 05/16/23  0549 05/15/23  0642 05/13/23  0559   BUN mg/dL 21 24 22 27*   POTASSIUM mmol/L 4 3 3 9 4 2 3 5   CHLORIDE mmol/L 109* 106 104 104   CREATININE mg/dL 1 02 1 03 1 29 1 22   AST U/L  --   --   --  39   ALT U/L  --   --   --  86*              Drug regimen reviewed, all potential adverse effects identified and addressed:    Scheduled Meds:  Current Facility-Administered Medications   Medication Dose Route Frequency Provider Last Rate   • acetaminophen  975 mg Oral TID PRN Nevin Kathleen MD     • aspirin  81 mg Oral Daily Juan Heard, " DO     • atorvastatin  40 mg Oral Daily [facility], DO     • bisacodyl  10 mg Rectal Daily PRN Martinez [facility], DO     • calcium carbonate  1,000 mg Oral Daily PRN [facility], [facility]     • citalopram  20 mg Oral Daily Martinez [facility], DO     • donepezil  5 mg Oral BID [facility], DO     • heparin (porcine)  5,000 Units Subcutaneous [facility] Martinez [facility], DO     • lidocaine  1 patch Topical Daily Martinez [facility], DO     • lidocaine   Topical Q4H PRN Aman Verdugo MD     • losartan  50 mg Oral Daily ERIC Trotter     • methocarbamol  500 mg Oral Q6H PRN Martinez [facility], DO     • metoprolol succinate  100 mg Oral Daily Martinez [facility], DO     • ondansetron  4 mg Oral Q6H PRN Martinez [facility], DO     • oxyCODONE  5 mg Oral Q6H PRN Martinez [facility], DO     • oxyCODONE  2 5 mg Oral Q6H PRN [facility], DO     • pantoprazole  20 mg Oral Early Morning Martinez [facility], DO     • polyethylene glycol  17 g Oral QAM Martienz [facility], DO     • senna  2 tablet Oral Daily PRN Martinez [facility], DO     • senna-docusate sodium  2 tablet Oral BID Martinez [facility], DO     • tamsulosin  0 4 mg Oral Daily With Dinner [facility], DO     • ticagrelor  90 mg Oral Q12H [facility] Martinez [facility], DO     • traZODone  50 mg Oral HS Aman Verdugo MD         Chief Complaints:  Depressed    Subjective: On eval, patient reports feeling frustrated and depressed at times  He denies significant anxiety  He notes significant difficulty sleeping  He states he has tried melatonin at home but that has not helped  Extended time spent with supportive counseling and discussion with patient and family today  Patient denies worsening strength, sensation, vision, lightheadedness, shortness of breath, back pain, or other new complaints  ROS: A 10 point ROS was performed; negative except as noted above         Physical Exam:  Vitals:    05/18/23 1449   BP: 128/64   Pulse: 75   Resp: 19   Temp: 98 °F (36 7 °C)   SpO2: 97%       GEN:  Sitting in NAD "  HEENT/NECK: MMM  CARDIAC: Regular rate rhythm, no murmers, no rubs, no gallops  LUNGS:  clear to auscultation, no wheezes, rales, or rhonchi  ABDOMEN: Soft, non-tender, non-distended, normal active bowel sounds  EXTREMITIES/SKIN:  no calf edema, no calf tenderness to palpation  NEURO:   MENTAL STATUS: Some improvements in aphasia, adequate wakefulness, able to follow commands most of the time and Strength/MMT:  Unchanged  PSYCH:  Affect:  Somewhat frustrated and down    HPI:  Per admitting provider   \"Constanza Jensen is a 58 y o  male with history of prediabetes, hypertension, depression, urinary retention on Flomax, carotid stenosis, depression, prior left MCA and right MCA stroke with aphasia and memory deficits now on Aricept who presented to the [facility] [address] on 4/26 for AMS witnessed by his wife with abnormal speech that was noted to be similar to his prior strokes  Of note he was recently in the hospital earlier in the month for a left MCA stroke  He also had a right MCA stroke status post thrombectomy back in 2019  He is a prior smoker and quit at the time of his initial stroke  Additionally he had hyponatremia which was felt to be due to HCTZ use and potentially poor oral intake, chronic low back pain  He was seen by neurology as well as neurosurgery and eventually switched from his Eliquis/aspirin to aspirin and Brilinta  It was felt not to be cardioembolic by neurosurgery  He was also not given TNK given his recent Eliquis use when he arrived  He had imaging prior on his last admission however now with new focus of diffusion abnormality in the right frontal parietal region and in the left periatrial and parieto-occipital region  No acute hemorrhage was seen  Had a carotid Doppler showing LICA 29-81%/MXJE <19%   IVY was done and did not show any PFO or thrombus   Given that patient was on Plavix when he had his stroke on 04/16/2023, he was placed on Alize Bustle was a " "question of vasculitis as the etiology but Neurosurgery saw him in consult and felt that it was likely related to atherosclerotic and carotid disease and they stopped the Eliquis and placed back on ASA   He had a cerebral arteriogram on 5/4 23 with a left ICA PTA/stent placement   There was no vasculitis noted    Per Neurology full anticoagulation did not need to be restarted  The patient was evaluated by the Rehabilitation team and deemed an appropriate candidate for comprehensive inpatient rehabilitation and admitted to the [facility] on 5/12/2023  2:35 PM\"    ** Please Note: Fluency Direct voice to text software may have been used in the creation of this document  **      50 minutes or greater spent for this encounter which included a combination of face-to-face time with Nikki Games and wife and non-face-to-face time which in part specifically includes management of strokes and mood and sleep  Face-to-face time included extended discussion with patient regarding current condition, medical history, mood, medical/rehabilitation management, and disposition  Non face-to-face time included coordination of care with patient's co-managing AP and/or physician(s) thru communication and review of their recent documentation as well as reviewing vitals, bowel/bladder function, recent labs, and notes from therapy, CM, and nursing        "

## 2023-05-19 ENCOUNTER — TELEPHONE (OUTPATIENT)
Dept: CARDIOLOGY CLINIC | Facility: CLINIC | Age: 63
End: 2023-05-19

## 2023-05-19 ENCOUNTER — TELEPHONE (OUTPATIENT)
Dept: OTHER | Facility: HOSPITAL | Age: 63
End: 2023-05-19

## 2023-05-19 LAB
BASOPHILS # BLD AUTO: 0.21 THOUSANDS/ÂΜL (ref 0–0.1)
BASOPHILS NFR BLD AUTO: 2 % (ref 0–1)
EOSINOPHIL # BLD AUTO: 1.12 THOUSAND/ÂΜL (ref 0–0.61)
EOSINOPHIL NFR BLD AUTO: 8 % (ref 0–6)
ERYTHROCYTE [DISTWIDTH] IN BLOOD BY AUTOMATED COUNT: 13.6 % (ref 11.6–15.1)
GLUCOSE SERPL-MCNC: 177 MG/DL (ref 65–140)
HCT VFR BLD AUTO: 37.3 % (ref 36.5–49.3)
HGB BLD-MCNC: 12.6 G/DL (ref 12–17)
IMM GRANULOCYTES # BLD AUTO: 0.12 THOUSAND/UL (ref 0–0.2)
IMM GRANULOCYTES NFR BLD AUTO: 1 % (ref 0–2)
LYMPHOCYTES # BLD AUTO: 3.28 THOUSANDS/ÂΜL (ref 0.6–4.47)
LYMPHOCYTES NFR BLD AUTO: 23 % (ref 14–44)
MCH RBC QN AUTO: 32.2 PG (ref 26.8–34.3)
MCHC RBC AUTO-ENTMCNC: 33.8 G/DL (ref 31.4–37.4)
MCV RBC AUTO: 95 FL (ref 82–98)
MONOCYTES # BLD AUTO: 1.17 THOUSAND/ÂΜL (ref 0.17–1.22)
MONOCYTES NFR BLD AUTO: 8 % (ref 4–12)
NEUTROPHILS # BLD AUTO: 8.24 THOUSANDS/ÂΜL (ref 1.85–7.62)
NEUTS SEG NFR BLD AUTO: 58 % (ref 43–75)
NRBC BLD AUTO-RTO: 0 /100 WBCS
PLATELET # BLD AUTO: 316 THOUSANDS/UL (ref 149–390)
PMV BLD AUTO: 9.8 FL (ref 8.9–12.7)
RBC # BLD AUTO: 3.91 MILLION/UL (ref 3.88–5.62)
WBC # BLD AUTO: 14.14 THOUSAND/UL (ref 4.31–10.16)

## 2023-05-19 PROCEDURE — 82948 REAGENT STRIP/BLOOD GLUCOSE: CPT

## 2023-05-19 PROCEDURE — 99232 SBSQ HOSP IP/OBS MODERATE 35: CPT

## 2023-05-19 PROCEDURE — 99232 SBSQ HOSP IP/OBS MODERATE 35: CPT | Performed by: NURSE PRACTITIONER

## 2023-05-19 PROCEDURE — 0T9B70Z DRAINAGE OF BLADDER WITH DRAINAGE DEVICE, VIA NATURAL OR ARTIFICIAL OPENING: ICD-10-PCS | Performed by: UROLOGY

## 2023-05-19 PROCEDURE — 97535 SELF CARE MNGMENT TRAINING: CPT

## 2023-05-19 PROCEDURE — 92507 TX SP LANG VOICE COMM INDIV: CPT

## 2023-05-19 PROCEDURE — 99222 1ST HOSP IP/OBS MODERATE 55: CPT | Performed by: UROLOGY

## 2023-05-19 PROCEDURE — 97530 THERAPEUTIC ACTIVITIES: CPT

## 2023-05-19 PROCEDURE — 97112 NEUROMUSCULAR REEDUCATION: CPT

## 2023-05-19 PROCEDURE — 51703 INSERT BLADDER CATH COMPLEX: CPT | Performed by: NURSE PRACTITIONER

## 2023-05-19 PROCEDURE — 85025 COMPLETE CBC W/AUTO DIFF WBC: CPT | Performed by: NURSE PRACTITIONER

## 2023-05-19 RX ORDER — TAMSULOSIN HYDROCHLORIDE 0.4 MG/1
0.8 CAPSULE ORAL
Status: DISCONTINUED | OUTPATIENT
Start: 2023-05-19 | End: 2023-06-06

## 2023-05-19 RX ADMIN — METOPROLOL SUCCINATE 100 MG: 100 TABLET, EXTENDED RELEASE ORAL at 09:43

## 2023-05-19 RX ADMIN — DONEPEZIL HYDROCHLORIDE 5 MG: 5 TABLET ORAL at 18:02

## 2023-05-19 RX ADMIN — ATORVASTATIN CALCIUM 40 MG: 40 TABLET, FILM COATED ORAL at 09:42

## 2023-05-19 RX ADMIN — TRAZODONE HYDROCHLORIDE 50 MG: 50 TABLET ORAL at 22:12

## 2023-05-19 RX ADMIN — DONEPEZIL HYDROCHLORIDE 5 MG: 5 TABLET ORAL at 09:43

## 2023-05-19 RX ADMIN — ASPIRIN 81 MG: 81 TABLET, COATED ORAL at 09:42

## 2023-05-19 RX ADMIN — TAMSULOSIN HYDROCHLORIDE 0.8 MG: 0.4 CAPSULE ORAL at 16:29

## 2023-05-19 RX ADMIN — HEPARIN SODIUM 5000 UNITS: 5000 INJECTION INTRAVENOUS; SUBCUTANEOUS at 14:44

## 2023-05-19 RX ADMIN — LOSARTAN POTASSIUM 50 MG: 50 TABLET, FILM COATED ORAL at 10:05

## 2023-05-19 RX ADMIN — TICAGRELOR 90 MG: 90 TABLET ORAL at 09:48

## 2023-05-19 RX ADMIN — PANTOPRAZOLE SODIUM 20 MG: 20 TABLET, DELAYED RELEASE ORAL at 06:06

## 2023-05-19 RX ADMIN — HEPARIN SODIUM 5000 UNITS: 5000 INJECTION INTRAVENOUS; SUBCUTANEOUS at 22:11

## 2023-05-19 RX ADMIN — LIDOCAINE HYDROCHLORIDE 5 APPLICATION.: 20 JELLY TOPICAL at 10:05

## 2023-05-19 RX ADMIN — POLYETHYLENE GLYCOL 3350 17 G: 17 POWDER, FOR SOLUTION ORAL at 09:41

## 2023-05-19 RX ADMIN — HEPARIN SODIUM 5000 UNITS: 5000 INJECTION INTRAVENOUS; SUBCUTANEOUS at 06:06

## 2023-05-19 RX ADMIN — CITALOPRAM HYDROBROMIDE 20 MG: 20 TABLET ORAL at 09:42

## 2023-05-19 RX ADMIN — SENNOSIDES, DOCUSATE SODIUM 2 TABLET: 8.6; 5 TABLET ORAL at 18:02

## 2023-05-19 RX ADMIN — TICAGRELOR 90 MG: 90 TABLET ORAL at 22:12

## 2023-05-19 RX ADMIN — LIDOCAINE 5% 1 PATCH: 700 PATCH TOPICAL at 09:44

## 2023-05-19 NOTE — PROGRESS NOTES
Physical Medicine and Rehabilitation Progress Note  Yue Kirby 58 y o  male MRN: [id number]  Unit/Bed#: -01 Encounter: [id number]      Assessment & Plan:     Decline in ADLs and mobility: Functional assessment - improving         FIM  Care Score  Admit Score Recent Score    Total assist  1-100% or 2p    Tot ADLs To hygiene   Max assist 2-51-75%    Sub  , bathing, LBD   Mod assist 3- 26-50%  Par  UBD   Min assist 3- 25% or < Par     CG assist 4  TA     Sup/Setup 4-5 Sup     Mod-I/Indep 6 MI      Transfers  Total assist  Total assist     Ambulation   Total assist  Total assist (30 ft mod assist x2)    Stairs   Total assist/NT      Goal: CGA-supervision for most ADLs and  for mobility  Major barriers:  Impaired cognition, speech, balance, deconditioning  Dispo: Home with [facility] 21+ days from admission      * Stroke [facility])  Assessment & Plan  5/18 -function slowly improving, neuro exam stable  5/16 - Function improving; neuro exam stable; did walk with 2 person assist and was OOB good amount of day; mood still frustrated at times and provided supportive counseling and deep breathing exercises; he does have some activity intolerance and will write for 900 min program if needed   5/16 Extended discussion with wife and she does feel overall he is doing better - less lethargic, improving coordination; she was somewhat concerned he was in bed more first few days of rehab but appears to be improving overall on my eval and discussions with therapies today; will continue to monitor neuro exam closely   - Sub-optimal fluid/oral intake at times - IVF by IM 5/15 > encourage fluids/intake and monitor > improving some   - Nutrition c/s - continue nutritional supp; wife reports pt prefers finger foods; ensure set-up/supervision PRN; continue SLP  Recent multifocal ischemic infarcts in different distributions of unclear etiology   · Imaging revealed multifocal strokes; underwent L ICA PTA/stenting 5/4 also hx of R ICA stenosis · Has loop recorder placed 2019 and neuro wants OP replacement of loop recorder - OP cards f/u   · CT of the chest abdomen pelvis without evidence of malignancy done on the last admission on 4/17  · A-gram not c/w vasculitis   · Thrombosis panel done in 04/18/2023 that was only abnl for AT3 that was low in the acute setting and needs to be repeated in future  Repeat OP thrombosis panel thru neuro or hematology  · Recent IVY with no PFO  · Previous TTE 04/18/23 showed ED of 55% and nl wall motion and mildly dilated L atrium  · Continue Brilinta and aspirin as well as statin for secondary stroke prophylaxis   · Continue Physical therapy, Occupational Therapy, speech therapy  · Monitor neuro exam closely       Bilateral carotid artery stenosis  Assessment & Plan  · Status post left ICA angioplasty and stent placement neurovascular service  · Per IM- S/p L ICA PTA/stent 5/4/23;  Recheck carotid doppler 6 weeks and see NS  · Continue Brilinta, aspirin and statin  · BP mgmt per IM here  · OP vasc sx follow-up      Anxiety and depression  Assessment & Plan  Anxiety, depression, significant insomnia with difficulty adjusting  Provided additional supportive counseling  Neuropsych c/s   Continue chronic home Celexa 20mg qday   Trial adding 50 mg trazodone at bedtime  -Monitor for signs and symptoms of excessive serotonin  -Monitor mood, efficacy, tolerance     Urinary retention  Assessment & Plan  Removed jo and began voiding trial -so far no significant voluntary output requiring straight catheterizations; continue voiding trial  - toileting program Q2-4h during day, Q4-6h overnight, record bladder scans Q4H, PVRs PRN, and voided outputs; PRN straight catheterization >450 cc; customize as indicated  - Monitor for infection   - Continue tamsulosin 0 4 mg with dinner      Leukocytosis  Assessment & Plan  · WBC up to 14 K on 5/15; no s/s of infection but remains at risk; pt afebrile  · Comgmt with IM   · Monitor for s/s "of infection or other etiologies; monitor vitals/labs     Hypertriglyceridemia  Assessment & Plan  Continue statin    Primary hypertension  Assessment & Plan  · Mgmt per IM:  hydrochlorothiazide 12 5 mg daily, metoprolol succinate 100 mg daily as well as Cozaar 50 mg daily  · Hctz may be stopped by IM     GERD (gastroesophageal reflux disease)  Assessment & Plan  Continue pantoprazole and as needed Tums    At risk for venous thromboembolism (VTE)  Assessment & Plan  SCDs, ambulation, and heparin       Abnormal laboratory test  Assessment & Plan  AT3 89% on 4/2023 lab per prior providers; can be low from recent stroke and not true AT3 def  - Recommend follow-up with neurology and repeat thrombosis panel as an outpatient     Hypokalemia  Assessment & Plan  · Remains improved 5/16    Chronic ischemic left MCA stroke  Assessment & Plan  · Recent left MCA stroke in the beginning of April at 4/16/2023 with aphasia and was empirically treated with Eliquis 5 mg twice daily as well as aspirin 81 mg due to embolic stroke of undetermined source per prior documentation  · Despite this we presented with additional strokes for this admission    History of tobacco use  Assessment & Plan  · Patient with a history of tobacco use yet quit in 2019  · Does not need nicotine patches    Prediabetes  Assessment & Plan  · Last hemoglobin A1c of 5 8  · Mgmt per IM during [facility]   · Started on consistent carbohydrate 3 diet here in addition to cardiac diet on admission to [facility]    Chronic low back pain  Assessment & Plan  · Adequate control  · Trial APAP TID PRN - discussed with pt   · Per prior provider \"Continue to progress and wean at this time  Had a discussion with the patient on arrival after reviewing notes from Dr Ruthann Perez, and review of PDMP patient was never previously on opioid therapy except for short course of tramadol  Here he has been on oxycodone 10 mg dosing for severe pain as well as Norco and oxycodone 5 mg    On admission spoke " "with patient and will change him to a oxycodone 2 5 mg for moderate pain and 5 mg for severe pain with the plan of overall weaning till he is no longer on this medication  No clear indication for chronic opioid therapy at this time\"  · Continue lidocaine patch and aqua K-pad but not in the same area at the same time    Memory deficits  Assessment & Plan  · Started on Aricept due to memory difficulties after stroke in 2019  · Currently on 5 mg twice daily  · Sees Dr Tunde Hong in outpatient neurology    Chronic ischemic right MCA stroke  Assessment & Plan  · Patient with history of right MCA stroke back in March 2019 status post thrombectomy  · Had loop recorder placement without overt arrhythmia and was on Plavix 75 mg at that time  Also with known severe right M1 stenosis  · See \"Stroke\" mgmt above         Other Medical Issues:  • Monitor for     Follow-up providers and other issues to be followed up after discharge  PCP  Neuro  Cards  [facility] Sx     CODE: Level 1: Full Code    Restrictions include: Fall precautions    Objective:     Allergies per EMR  Diagnostic Studies: Reviewed, no new imaging  No orders to display     See above as well    Laboratory: Labs reviewed  Results from last 7 days   Lab Units 05/19/23  0618 05/15/23  0642 05/13/23  0559   HEMOGLOBIN g/dL 12 6 13 3 12 8   HEMATOCRIT % 37 3 37 9 36 5   WBC Thousand/uL 14 14* 14 23* 12 84*     Results from last 7 days   Lab Units 05/18/23  0606 05/16/23  0549 05/15/23  0642 05/13/23  0559   BUN mg/dL 21 24 22 27*   POTASSIUM mmol/L 4 3 3 9 4 2 3 5   CHLORIDE mmol/L 109* 106 104 104   CREATININE mg/dL 1 02 1 03 1 29 1 22   AST U/L  --   --   --  39   ALT U/L  --   --   --  86*              Drug regimen reviewed, all potential adverse effects identified and addressed:    Scheduled Meds:  Current Facility-Administered Medications   Medication Dose Route Frequency Provider Last Rate   • acetaminophen  975 mg Oral TID PRN She King MD     • aspirin  " 81 mg Oral Daily Pooja Rosales, DO     • atorvastatin  40 mg Oral Daily Pooja Rosales, DO     • bisacodyl  10 mg Rectal Daily PRN Pooja Rosales, DO     • calcium carbonate  1,000 mg Oral Daily PRN Pooja Rosales, DO     • citalopram  20 mg Oral Daily Pooja Rosales, DO     • donepezil  5 mg Oral BID Pooja Rosales, DO     • heparin (porcine)  5,000 Units Subcutaneous [facility] Pooja Rosales, DO     • lidocaine  1 patch Topical Daily Pooja Rosales, DO     • lidocaine   Topical Q4H PRN Jaki Lea MD     • losartan  50 mg Oral Daily ERIC Olivares     • methocarbamol  500 mg Oral Q6H PRN Pooja Rosales, DO     • metoprolol succinate  100 mg Oral Daily Pooja Rosales, DO     • ondansetron  4 mg Oral Q6H PRN Pooja Rosales, DO     • oxyCODONE  5 mg Oral Q6H PRN Pooja Rosales, DO     • oxyCODONE  2 5 mg Oral Q6H PRN Pooja Rosales, DO     • pantoprazole  20 mg Oral Early Morning Pooja Rosales, DO     • polyethylene glycol  17 g Oral QAM Pooja Rosales, DO     • senna  2 tablet Oral Daily PRN Pooja Rosales, DO     • senna-docusate sodium  2 tablet Oral BID Pooja Rosales, DO     • tamsulosin  0 4 mg Oral Daily With Dinner Pooja Rosales, DO     • ticagrelor  90 mg Oral Q12H [address] Pooja Rosales, DO     • traZODone  50 mg Oral HS Jaki Lea MD         Chief Complaints:  Rehab follow-up     Subjective: On eval, patient reports sleeping well overnight  He denies feeling overly sleepy this morning  He denies worsening strength, sensation, speech, lightheadedness, abdominal or bladder pain, or other new complaints  ROS: A 10 point ROS was performed; negative except as noted above         Physical Exam:  Vitals:    05/19/23 0943   BP: 142/72   Pulse: 72   Resp:    Temp:    SpO2:    Vitals above reviewed on date of encounter    GEN:  Lying in bed in NAD   HEENT/NECK: MMM  CARDIAC: Regular rate rhythm, no murmers, no rubs, no gallops  LUNGS:  clear to auscultation, no wheezes, rales, or rhonchi  ABDOMEN: "Soft, non-tender, non-distended, normal active bowel sounds  EXTREMITIES/SKIN:  no calf edema, no calf tenderness to palpation  NEURO:   MENTAL STATUS: Adequate wakefulness; stable aphasia and interaction and Strength/MMT:  Unchanged  PSYCH:  Affect:  Euthymic    HPI:  Per admitting provider   \"Constanza Paz is a 58 y o  male with history of prediabetes, hypertension, depression, urinary retention on Flomax, carotid stenosis, depression, prior left MCA and right MCA stroke with aphasia and memory deficits now on Aricept who presented to the [address] [place] [address] on 4/26 for AMS witnessed by his wife with abnormal speech that was noted to be similar to his prior strokes  Of note he was recently in the hospital earlier in the month for a left MCA stroke  He also had a right MCA stroke status post thrombectomy back in 2019  He is a prior smoker and quit at the time of his initial stroke  Additionally he had hyponatremia which was felt to be due to HCTZ use and potentially poor oral intake, chronic low back pain  He was seen by neurology as well as neurosurgery and eventually switched from his Eliquis/aspirin to aspirin and Brilinta  It was felt not to be cardioembolic by neurosurgery  He was also not given TNK given his recent Eliquis use when he arrived  He had imaging prior on his last admission however now with new focus of diffusion abnormality in the right frontal parietal region and in the left periatrial and parieto-occipital region  No acute hemorrhage was seen  Had a carotid Doppler showing LICA 21-93%/FKLR <55%   IVY was done and did not show any PFO or thrombus   Given that patient was on Plavix when he had his stroke on 04/16/2023, he was placed on Lyndsay Rubype was a question of vasculitis as the etiology but Neurosurgery saw him in consult and felt that it was likely related to atherosclerotic and carotid disease and they stopped the Eliquis and placed back on ASA   He had a " "cerebral arteriogram on 5/4 23 with a left ICA PTA/stent placement   There was no vasculitis noted    Per Neurology full anticoagulation did not need to be restarted  The patient was evaluated by the Rehabilitation team and deemed an appropriate candidate for comprehensive inpatient rehabilitation and admitted to the [facility] on 5/12/2023  2:35 PM\"    ** Please Note: Fluency Direct voice to text software may have been used in the creation of this document  **    I personally performed the required components and examined the patient myself in person on 5/19/23        "

## 2023-05-19 NOTE — PROGRESS NOTES
"   05/19/23 0840   Pain Assessment   Pain Assessment Tool 0-10   Pain Score No Pain   Restrictions/Precautions   Precautions Aphasia;Bed/chair alarms;Cognitive;Aspiration; Fall Risk;Impulsive;Supervision on toilet/commode;Visual deficit  (R-inattention)   Weight Bearing Restrictions No   ROM Restrictions No   Lifestyle   Autonomy \"My dog is coming    tomorrow  \"   Sit to Lying   Type of Assistance Needed Physical assistance   Physical Assistance Level Total assistance   Comment Mod Ax1, CGA of 2nd person  Min-Mod multimodal cues  Sit to Lying CARE Score 1   Lying to Sitting on Side of Bed   Type of Assistance Needed Physical assistance   Physical Assistance Level Total assistance   Comment Mod Ax1, CGA of 2nd person  heavily relies on bed features  Lying to Sitting on Side of Bed CARE Score 1   Sit to Stand   Type of Assistance Needed Physical assistance   Physical Assistance Level Total assistance   Comment Mod Ax2 at bed rail   Sit to Stand CARE Score 1   Bed-Chair Transfer   Type of Assistance Needed Physical assistance;Verbal cues; [place] / Edwinna Stacy; Adaptive equipment   Physical Assistance Level Total assistance   Comment Max Ax1 anteriorly and Min A of 2nd person for sit pivot EOB>w/c  3rd person to steady w/c to inc safety  Min A of 2nd person Mod Ax2 slideboard xfer w/c>EOB, difficulty motor planning and sequencing  Chair/Bed-to-Chair Transfer CARE Score 1   Toileting Hygiene   Type of Assistance Needed Physical assistance   Physical Assistance Level Total assistance   Comment Mod Ax2 to steady in stance w/ BUE support of bed rail  3rd person A w/ urinal and for clothing management, add'lly attempted to void while seated in w/c however unable  Toileting Hygiene CARE Score 1   Toilet Transfer   Type of Assistance Needed Physical assistance   Physical Assistance Level Total assistance   Comment Mod Ax2 to stand w/ BUE support of bed rail, 3rd person for swap-out     Toilet Transfer CARE Score 1 " Neuromuscular Education   Functional Movement Patterns 3x10 chest press and bicep curls w/ 1 5# dowel and OTR providing guided resistance w/ use of blue theraband  Req Min-Mod vc's to focus on pacing to improve coordination  Add'lly req cues to improve R visual tracking to inc R-attention  3x10 supination/pronation w/ use of 10# flex bar focusing on b/l integration  Tolerated well w/ rest breaks between sets  Cognition   Overall Cognitive Status Impaired   Assessment   Treatment Assessment Pt seen for skilled OT session focusing on fxnl xfers, toileting, fxnl standing at bed rail, and RUE NMR  Time spent rapport building  Pt expressed inc feelings of sadness w/ current deficits, extended emotional support and encouragement provided  Req inc time for for fxnl tasks 2* need for Mod multimodal cues to improve technique and inc safety  Add'lly limited by poor act tolerance, physically and cognitively fatigues quickly  Cont OT POC: unsupported sitting balance, endurance work, fxnl xfer training, repetitive safety training, R-inattention, RUE NMR, and ADL retraining  Nursing staff requesting pt rest in bed following session 2* jo insertion, all needs within reach  Prognosis Fair   Problem List Decreased strength;Decreased endurance; Impaired balance;Decreased mobility; Decreased coordination;Decreased cognition;Decreased range of motion; Impaired judgement;Decreased safety awareness; Impaired vision; Impaired sensation; Impaired tone   Plan   Treatment/Interventions ADL retraining;Functional transfer training; Therapeutic exercise; Endurance training;Cognitive reorientation;Patient/family training   Progress Progressing toward goals   Recommendation   OT Discharge Recommendation   (pending progress)   OT Therapy Minutes   OT Time In 0840   OT Time Out 0940   OT Total Time (minutes) 60   OT Mode of treatment - Individual (minutes) 60   OT Mode of treatment - Concurrent (minutes) 0   OT Mode of treatment - Group (minutes) 0   OT Mode of treatment - Co-treat (minutes) 0   OT Mode of Treatment - Total time(minutes) 60 minutes   OT Cumulative Minutes 475   Therapy Time missed   Time missed?  No

## 2023-05-19 NOTE — PROGRESS NOTES
"   23 2138   Pain Assessment   Pain Assessment Tool 0-10   Pain Score No Pain   Restrictions/Precautions   Precautions Aphasia;Bed/chair alarms;Cognitive; Fall Risk;Impulsive;Supervision on toilet/commode;Visual deficit  (R inattention)   Comprehension   Comprehension (FIM) 3 - Understands basic info/conversation 50-74% of time   Expression   Expression (FIM) 2 - Uses only simple expressions or gestures (waves, hello)   Social Interaction   Social Interaction (FIM) 5 - Interacts appropriately with others 90% of time   Problem Solving   Problem solving (FIM) 2 - Solves basic problems 25-49% of time   Memory   Memory (FIM) 3 - Recognizes, recalls/performs 50-74%   Speech/Language/Cognition Assessmetn   Treatment Assessment Multiple attempts to see pt at scheduled times this morning (10am and 1045am) however pt with nursing to have jo catheter placed  Pt able to see for afternoon session however session ended early due to Urology present to assist in jo catheter placement that was unable to be completed earlier today  Tasks that were able to completed were as follows:    Verbal Expression with review of orientation information:  Pt was able to recite his name independently, but benefitted from verbal and [facility] written cues to ID his  and current age  Pt was also able to stated his full address independently  However with naming current place, pt stated \"[place]\" and needed cues to \"[facility]\"  Pt also benefitted from phonemic cue for current city  Pt was independent in stating \"I had a stroke\" for current situation  Pt was able to name is two children Dorhilario White and Shahid) but required verbal cues to sequence and name his 3 grandchildren (Lupe Petty, and Petros)  Pt was independent with naming his dog, Dora Castleman  Reading Comprehension with Expression  After reviewing all biographical information, pt presented with written cheatsheet of all this information   Pt did need verbal cues and unison speech at " times to be able to read what was on the page however noted pt was able to ID key words to recite  Plan to cont to utilize and practice with pt  Verbal Expression with Object naming by Description  Pt started further object naming task when provided short description pt was able to complete with 4/5acc increasing with additional verbal and semantic cues  Pt conts with moderate receptive and moderate to severe expressive deficits but will cont to make progress with skilled SLP services to maximize overall cognitive linguistic communication skills for increased independence with communicating wants and needs at this time  SLP Therapy Minutes   SLP Time In 1330   SLP Time Out 1410   SLP Total Time (minutes) 40   SLP Mode of treatment - Individual (minutes) 40   SLP Mode of treatment - Concurrent (minutes) 0   SLP Mode of treatment - Group (minutes) 0   SLP Mode of treatment - Co-treat (minutes) 0   SLP Mode of Treatment - Total time(minutes) 40 minutes   SLP Cumulative Minutes 370   Therapy Time missed   Time missed?  Yes   Amount of time missed 80   Reason for time missed   (pt working with nursing for difficult jo placement for first session, urology present towards end of second session therefore required to end early)   Time(s) multiple attempts made 1000; 1045

## 2023-05-19 NOTE — PROCEDURES
BEDSIDE PROCEDURE  Indwelling Catheter PROCEDURE NOTE    Pre-operative Diagnosis: BPH, Urinary Retention        Post-operative Diagnosis: BPH, Urinary Retention & Diff Catheterization    INDICATION   Gib Emy admitted to rehab after stroke  No prior  history  Urinary retention requiring intermittent catheterization several times  Nursing unable to insert Cortez catheter   Consultation requested to perform Cortez Catheter Placement  TIME OUT: Correct patient identity  PROCEDURE   Consent: Patient was agreeable  Formal  Informed consent however, not applicable  Under sterile conditions the patient was positioned  Betadine solution and sterile drapes were utilized  Non- Petroleum based gel was used to lubricate urethral meatus insertion site  Utilized 16 Polish coudé tip catheter urine was obtained without any difficulties and  Without evidence of hematuria or trauma  Findings  400 mL ml of clear yellow urine was obtained  Complications:  None; patient tolerated the procedure well  Condition: stable    Plan  Maintain Cortez to straight drainage  Do not remove  Flush BID using Tobias syringe and 120 ml NSS  No further  intervention required  Void trial in 14 days        ERIC Araujo        Attending Attestation: Not Applicable

## 2023-05-19 NOTE — CASE MANAGEMENT
Clinical update faxed via [facility] to dayan at [facility] [phone]   Requested additional 7 days, awaiting determination

## 2023-05-19 NOTE — TELEPHONE ENCOUNTER
Delfino Rojas is a 39-year-old male seen in urologic consultation at [facility] for urinary retention and inability to insert catheter  16 Swiss coudé tip catheter inserted  Patient will undergo void trial prior to discharge from rehab  Please follow-up for symptom reassessment and BPH work-up  Thank you

## 2023-05-19 NOTE — CONSULTS
CONSULT    Patient Name: Andrey Farias  Patient MRN: [id number]  Date of Service: 5/19/2023   Date / Time Note Created: 5/19/2023 2:26 PM   Referring Provider: Jerome Rangel MD    Provider Creating Note: ERIC iVcente    PCP: Elisha Oliva  Attending Provider:  Jerome Rangel MD    Reason for Consult: Difficult Cortez Catheterization/Cortez Placement Assistance    HPI:  Andrey Farias is a 78-year-old medically complex male with history of prediabetes, hypertension, carotid stenosis prior bilateral MCA stroke with aphasia and memory deficits, presenting to [place] with neurologic symptoms and evidence of bilateral carotid artery stenosis status post right common carotid arteriogram, left common carotid arteriogram, left ICA angioplasty and stenting 5/4/2023 and transferred to the acute rehab center for comprehensive inpatient rehabilitation 5/12  Patient had Cortez catheter in situ secondary to retention and catheter was removed requiring several instances of intermittent catheterization by nursing staff  Of note, there is no documentation of prior formal urologic consultation, evaluation or  surgical manipulation  Patient denies such  Patient is afebrile, hemodynamically stable without flank, abdominal or suprapubic pain  Renal function within normal limits  Mild leukocytosis of 14 K  Recent negative urine analysis  Patient has not voided spontaneously since Cortez catheter removal   Nursing unable to insert Cortez  Urologic consultation requested for further assistance           Active Problems:    Patient Active Problem List   Diagnosis   • History of stroke   • Headache   • Primary hypertension   • GERD (gastroesophageal reflux disease)   • At risk for venous thromboembolism (VTE)   • Hypertriglyceridemia   • Nicotine dependence   • Bilateral carotid artery stenosis   • Presbyopia   • Urinary retention   • Fall   • 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)   • Hyponatremia   • Prediabetes   • SIRS (systemic inflammatory response syndrome) ([facility])   • Tachycardia   • Abdominal aortic aneurysm (AAA) ([facility])   • Leukocytosis   • History of tobacco use   • Chronic ischemic left MCA stroke   • Hypokalemia   • Abnormal laboratory test            Impressions  1  Urinary retention--likely neurogenic cause given recent CVA  No prior history until now  2  Difficult catheterization--mild stricture, likely due to inflammation recent multiple intermittent catheterizations  Recommendations  · Continue medical optimization and rehab  Inserted  · Inserted 16 Western Pam coudé catheter with only mild difficulty  · Maintain to straight drainage  · Ambulate hydrate, avoid narcotics and continue bowel regimen  · Start Flomax  · Keep Cortez catheter at discharge for outpatient evaluation if less than 14 days  · Proceed with void trial if patient is still admitted and proceed with inpatient trial of void prior to discharge  · No further  intervention indicated this hospital stay                Past Medical History:   Diagnosis Date   • Depression    • Diabetes mellitus ([facility] [address] )    • Dyslipidemia 03/26/2019   • Hyperlipidemia    • Hypertension    • Stroke [facility])        Past Surgical History:   Procedure Laterality Date   • BACK SURGERY     • IR CEREBRAL ANGIOGRAPHY  5/4/2023   • IR STROKE ALERT  3/19/2019   • SHOULDER SURGERY         Family History   Problem Relation Age of Onset   • Heart attack Mother    • Diabetes Mother    • Prostate cancer Father        Social History Socioeconomic History   • Marital status: /Civil Union     Spouse name: Not on file   • Number of children: Not on file   • Years of education: Not on file   • Highest education level: Not on file   Occupational History   • Not on file   Tobacco Use   • Smoking status: Former   • Smokeless tobacco: Never   Vaping Use   • Vaping Use: Some days   Substance and Sexual Activity   • Alcohol use: Not Currently   • Drug use: Never   • Sexual activity: Not on file   Other Topics Concern   • Not on file   Social History Narrative   • Not on file     Social Determinants of Health     Financial Resource Strain: Not on file   Food Insecurity: No Food Insecurity   • Worried About Running Out of Food in the Last Year: Never true   • Ran Out of Food in the Last Year: Never true   Transportation Needs: No Transportation Needs   • Lack of Transportation (Medical): No   • Lack of Transportation (Non-Medical):  No   Physical Activity: Not on file   Stress: Not on file   Social Connections: Not on file   Intimate Partner Violence: Not on file   Housing Stability: Low Risk    • Unable to Pay for Housing in the Last Year: No   • Number of Places Lived in the Last Year: 1   • Unstable Housing in the Last Year: No       Allergies   Allergen Reactions   • Flexeril [Cyclobenzaprine] Drowsiness   • Lisinopril Cough   • Nsaids Confusion       Review of Systems  Review of Systems - History obtained from chart review and the patient  General ROS: negative for - chills or fever  Respiratory ROS: no cough, shortness of breath, or wheezing  Cardiovascular ROS: no chest pain or dyspnea on exertion  Gastrointestinal ROS: no abdominal pain, change in bowel habits, or black or bloody stools  Genito-Urinary ROS: positive for - change in urinary stream  negative for - dysuria, hematuria, incontinence, pelvic pain or scrotal mass/pain  Neurological ROS: positive for - impaired coordination/balance and speech problems       Chart Review   Allergies, current medications, history, problem list    Vital Signs  /72   Pulse 72   Temp 97 8 °F (36 6 °C) (Oral)   Resp 18   Wt 104 kg (228 lb 2 8 oz)   SpO2 99%   BMI 34 69 kg/m²     Physical Exam  General appearance: alert and oriented, in no acute distress, appears stated age, cooperative and no distress  Head: Normocephalic, without obvious abnormality, atraumatic  Neck: no adenopathy, no carotid bruit, no JVD, supple, symmetrical, trachea midline and thyroid not enlarged, symmetric, no tenderness/mass/nodules  Lungs: clear to auscultation bilaterally  Heart: regular rate and rhythm, S1, S2 normal, no murmur, click, rub or gallop  Abdomen: soft, non-tender; bowel sounds normal; no masses,  no organomegaly  Extremities: extremities normal, warm and well-perfused; no cyanosis, clubbing, or edema  Pulses: 2+ and symmetric  Neurologic: Grossly normal  Cortez--myriam-orange urine    Clear without hematuria     Laboratory Studies  Lab Results   Component Value Date    HGBA1C 5 8 (H) 04/27/2023    K 4 3 05/18/2023     (H) 05/18/2023    CO2 26 05/18/2023    CO2 26 04/16/2019    GLUCOSE 117 04/16/2019    CREATININE 1 02 05/18/2023    BUN 21 05/18/2023    MG 2 2 05/06/2023    PHOS 3 6 04/27/2023     Lab Results   Component Value Date    WBC 14 14 (H) 05/19/2023    RBC 3 91 05/19/2023    HGB 12 6 05/19/2023    HCT 37 3 05/19/2023    MCV 95 05/19/2023    MCH 32 2 05/19/2023    RDW 13 6 05/19/2023     05/19/2023       Imaging and Other Studies        Medications   Current Facility-Administered Medications   Medication Dose Route Frequency Provider Last Rate   • acetaminophen  975 mg Oral TID PRN Vitaly Delgadillo MD     • aspirin  81 mg Oral Daily Guillaume Watson,      • atorvastatin  40 mg Oral Daily Guillaume Watson, DO     • bisacodyl  10 mg Rectal Daily PRN Guillaume Watson, DO     • calcium carbonate  1,000 mg Oral Daily PRN Guillaume Watson, DO     • citalopram  20 mg Oral Daily Andres Moss Gloria, DO     • donepezil  5 mg Oral BID Shade Roach, DO     • heparin (porcine)  5,000 Units Subcutaneous [facility] Shade Roach, DO     • lidocaine  1 patch Topical Daily Shade Roach, DO     • lidocaine   Topical Q4H PRN Val Winters MD     • losartan  50 mg Oral Daily ERIC Nguyen     • methocarbamol  500 mg Oral Q6H PRN Shade Roach, DO     • metoprolol succinate  100 mg Oral Daily Shade Roach, DO     • ondansetron  4 mg Oral Q6H PRN Shade Roach, DO     • oxyCODONE  5 mg Oral Q6H PRN Shade Roach, DO     • oxyCODONE  2 5 mg Oral Q6H PRN Shade Roach, DO     • pantoprazole  20 mg Oral Early Morning Shade Roach, DO     • polyethylene glycol  17 g Oral QAM Shade Roach, DO     • senna  2 tablet Oral Daily PRN Shade Roach, DO     • senna-docusate sodium  2 tablet Oral BID Shade Roach, DO     • tamsulosin  0 8 mg Oral Daily With Dinner ERIC Dowell     • ticagrelor  90 mg Oral Q12H [address], DO     • traZODone  50 mg Oral HS MD Marisol Rodriguez CRNP

## 2023-05-19 NOTE — PLAN OF CARE
Problem: PAIN - ADULT  Goal: Verbalizes/displays adequate comfort level or baseline comfort level  Description: Interventions:  - Encourage patient to monitor pain and request assistance  - Assess pain using appropriate pain scale  - Administer analgesics based on type and severity of pain and evaluate response  - Implement non-pharmacological measures as appropriate and evaluate response  - Consider cultural and social influences on pain and pain management  - Notify physician/advanced practitioner if interventions unsuccessful or patient reports new pain  Outcome: Progressing     Problem: INFECTION - ADULT  Goal: Absence or prevention of progression during hospitalization  Description: INTERVENTIONS:  - Assess and monitor for signs and symptoms of infection  - Monitor lab/diagnostic results  - Monitor all insertion sites, i e  indwelling lines, tubes, and drains  - Monitor endotracheal if appropriate and nasal secretions for changes in amount and color  - [place] appropriate cooling/warming therapies per order  - Administer medications as ordered  - Instruct and encourage patient and family to use good hand hygiene technique  - Identify and instruct in appropriate isolation precautions for identified infection/condition  Outcome: Progressing  Goal: Absence of fever/infection during neutropenic period  Description: INTERVENTIONS:  - Monitor WBC    Outcome: Progressing     Problem: SAFETY ADULT  Goal: Patient will remain free of falls  Description: INTERVENTIONS:  - Educate patient/family on patient safety including physical limitations  - Instruct patient to call for assistance with activity   - Consult OT/PT to assist with strengthening/mobility   - Keep Call bell within reach  - Keep bed low and locked with side rails adjusted as appropriate  - Keep care items and personal belongings within reach  - Initiate and maintain comfort rounds  - Make Fall Risk Sign visible to staff  - Offers, in advance of need  - Initiate/Carleen  - Obtain necessary fall risk man  - Apply yellow socks and bracelet for high fall risk patients  - Consider moving patient to room near nurses station  Outcome: Progressing  Goal: Maintain or return to baseline ADL function  Description: INTERVENTIONS:  -  Assess patient's ability to carry out ADLs; assess patient's baseline for ADL function and identify physical deficits which impact ability to perform ADLs (bathing, care of mouth/teeth, toileting, grooming, dressing, etc )  - Assess/evaluate cause of self-care deficits   - Assess range of motion  - Assess patient's mobility; develop plan if impaired  - Assess patient's need for assistive devices and provide as appropriate  - Encourage maximum independence but intervene and supervise when necessary  - Involve family in performance of ADLs  - Assess for home care needs following discharge   - Consider OT consult to assist with ADL evaluation and planning for discharge  - Provide patient education as appropriate  Outcome: Progressing  Goal: Maintains/Returns to pre admission functional level  Description: INTERVENTIONS:  - Perform BMAT or MOVE assessment daily    - Set and communicate daily mobility goal to care team and patient/family/caregiver  - Collaborate with rehabilitation services on mobility goals if consulted  - Perform Range of Mos a day  - Reposition patientrs    - Dangle patiey  - Stand pat  - Ambulate juana  - Out of bed to chair  - Out of bed for toileting  - Record patient progress and toleration of activity level   Outcome: Progressing     Problem: DISCHARGE PLANNING  Goal: Discharge to home or other facility with appropriate resources  Description: INTERVENTIONS:  - Identify barriers to discharge w/patient and caregiver  - Arrange for needed discharge resources and transportation as appropriate  - Identify discharge learning needs (meds, wound care, etc )  - Arrange for interpretive services to assist at discharge as needed  - Refer to Case Management Department for coordinating discharge planning if the patient needs post-hospital services based on physician/advanced practitioner order or complex needs related to functional status, cognitive ability, or social support system  Outcome: Progressing     Problem: Prexisting or High Potential for Compromised Skin Integrity  Goal: Skin integrity is maintained or improved  Description: INTERVENTIONS:  - Identify patients at risk for skin breakdown  - Assess and monitor skin integrity  - Assess and monitor nutrition and hydration status  - Monitor labs   - Assess for incontinence   - Turn and reposition patient  - Assist with mobility/ambulation  - Relieve pressure over bony prominences  - Avoid friction and shearing  - Provide appropriate hygiene as needed including keeping skin clean and dry  - Evaluate need for skin moisturizer/barrier cream  - Collaborate with interdisciplinary team   - Patient/family teaching  - Consider wound care consult   Outcome: Progressing     Problem: MOBILITY - ADULT  Goal: Maintain or return to baseline ADL function  Description: INTERVENTIONS:  -  Assess patient's ability to carry out ADLs; assess patient's baseline for ADL function and identify physical deficits which impact ability to perform ADLs (bathing, care of mouth/teeth, toileting, grooming, dressing, etc )  - Assess/evaluate cause of self-care deficits   - Assess range of motion  - Assess patient's mobility; develop plan if impaired  - Assess patient's need for assistive devices and provide as appropriate  - Encourage maximum independence but intervene and supervise when necessary  - Involve family in performance of ADLs  - Assess for home care needs following discharge   - Consider OT consult to assist with ADL evaluation and planning for discharge  - Provide patient education as appropriate  Outcome: Progressing  Goal: Maintains/Returns to pre admission functional level  Description: INTERVENTIONS:  - Perform BMAT or MOVE assessment daily    - Set and communicate daily mobility goal to care team and patient/family/caregiver  - Collaborate with rehabilitation services on mobility goals if consulted  - Perform Range of Mes a day  - Reposition patihours  - Dangle ay  - Stand juana  - Ambulate patiay  - Out of bed to   - Out of b  - Out of bed for toileting  - Record patient progress and toleration of activity level   Outcome: Progressing     Problem: Nutrition/Hydration-ADULT  Goal: Nutrient/Hydration intake appropriate for improving, restoring or maintaining nutritional needs  Description: Monitor and assess patient's nutrition/hydration status for malnutrition  Collaborate with interdisciplinary team and initiate plan and interventions as ordered  Monitor patient's weight and dietary intake as ordered or per policy  Utilize nutrition screening tool and intervene as necessary  Determine patient's food preferences and provide high-protein, high-caloric foods as appropriate       INTERVENTIONS:  - Monitor oral intake, urinary output, labs, and treatment plans  - Assess nutrition and hydration status and recommend course of action  - Evaluate amount of meals eaten  - Assist patient with eating if necessary   - Allow adequate time for meals  - Recommend/ encourage appropriate diets, oral nutritional supplements, and vitamin/mineral supplements  - Order, calculate, and assess calorie counts as needed  - Recommend, monitor, and adjust tube feedings and TPN/PPN based on assessed needs  - Assess need for intravenous fluids  - Provide specific nutrition/hydration education as appropriate  - Include patient/family/caregiver in decisions related to nutrition  Outcome: Progressing

## 2023-05-19 NOTE — PROGRESS NOTES
05/19/23 1220   Pain Assessment   Pain Assessment Tool 0-10   Pain Score No Pain   Restrictions/Precautions   Precautions Aphasia;Bed/chair alarms;Cognitive;Aspiration; Fall Risk;Impulsive;Supervision on toilet/commode;Visual deficit  (R inattention)   Weight Bearing Restrictions No   ROM Restrictions No   Subjective   Subjective Pt agreeable to get OOB and trial BWSS  Lying to Sitting on Side of Bed   Type of Assistance Needed Physical assistance   Physical Assistance Level Total assistance   Comment mod-maxAx1 and CGA of 2nd person   Lying to Sitting on Side of Bed CARE Score 1   Sit to Stand   Type of Assistance Needed Physical assistance   Physical Assistance Level Total assistance   Comment minAx1 with 2nd person CGA for safety due to impulsiveness at HR in hallway for BWSS harness donning  In stance CGA with cues to maintain upright posture and to fix R lateral lean  With fatigue in stance min-modAx1 due to R lateral lean   Sit to Stand CARE Score 1   Bed-Chair Transfer   Type of Assistance Needed Physical assistance   Physical Assistance Level Total assistance   Comment modified stand pivot transfer to L side only min-modAx2  Chair/Bed-to-Chair Transfer CARE Score 1   Car Transfer   Reason if not Attempted Safety concerns   Car Transfer CARE Score 88   Walk 10 Feet   Comment performed in BWSS therefore not care scored   Walk 50 Feet with Two Turns   Comment performed in BWSS therefore not care scored   Walk 150 Feet   Comment performed in BWSS therefore not care scored   Walking 10 Feet on Uneven Surfaces   Reason if not Attempted Safety concerns   Walking 10 Feet on Uneven Surfaces CARE Score 88   Ambulation   Primary Mode of Locomotion Prior to Admission Walk   Distance Walked (feet) 160 ft  (x1, 80'x3)   Assist Device   (BWSS)   Gait Pattern Ataxic; Inconsistant Deepti; Slow Deepti;Decreased foot clearance;R foot drag; Forward Flexion;Narrow NEREIDA;Step to; Step through; Decreased L stance; Improper weight shift   Limitations Noted In Balance; Coordination; Endurance; Heel Strike;Midline Orientation;Posture; Safety; Neglect; Sequencing;Speed;Strength;Swing   Provided Assistance with: Balance;Direction   Walk Assist Level Moderate Assist;Maximum Assist   Findings use of BWSS with 2 person b/l LOTUS this session  Pt continues with poor weight shift to L needing maxAx1 to assist with weight shift and addition of 4# ankle weight to LLE to improve weight shift onto L side  Max vc's for R foot clearance as well as to enlarge step  Use of mirror with alyse line down center as well to assist with midline orientation when ambulating managed by 3rd person, however, pt with difficulty looking in mirror when ambulating due to difficulty attaining to many tasks at hand  Does best utilizing mirror when in static stance to find midline orientation   Does the patient walk? 2  Yes   Wheelchair mobility   Findings not focus of this session   Curb or Single Stair   Reason if not Attempted Safety concerns   1 Step (Curb) CARE Score 88   4 Steps   Reason if not Attempted Safety concerns   4 Steps CARE Score 88   12 Steps   Reason if not Attempted Safety concerns   12 Steps CARE Score 88   Picking Up Object   Reason if not Attempted Safety concerns   Picking Up Object CARE Score 88   Therapeutic Interventions   Flexibility seated b/l hamstring/gastroc stretching 5' total between BWSS trials  Noted increased tone/spasticity in RLE and clonus   Equipment Use   Body Weight Support System for NPP utilized BWSS(use of blue sling), see ambulation section for details  BWSS allows pt to increase repetition and intensity in safe way, therefore continue to recommend use of BWSS in upcoming sessions as able for specificity, salience, reptition and intensity  Pt rates BWSS interventions as 15/20 on Reed scale  Assessment   Treatment Assessment Pt engaged in 70 min skilled PT session with focus on trials in BWSS as part of NPP for stroke recovery   Pt tolerated BWSS well and plan to continue to trial BWSS to increase intensity and repetition for stroke recovery  Pt still cont with poor weight shift to L and R lateral lean therefore also recommend to cont to focus on standing midline control as well  Pt continues to be limited by R inattention as well as well as deficits in motor planning, sequencing, and coordination  Pt is making progress with modified stand pivot transfer going to L side, continue to work on transfer trng with pt as well to maximize independence with mobilities  At this time cont with POC focusing on NPP interventions for stroke recovery  Problem List Decreased strength;Decreased endurance; Impaired balance;Decreased mobility; Decreased coordination;Decreased cognition;Decreased range of motion; Impaired judgement;Decreased safety awareness; Impaired vision; Impaired sensation; Impaired tone   Barriers to Discharge Inaccessible home environment;Decreased caregiver support   PT Barriers   Functional Limitation Car transfers; Ramp negotiation;Stair negotiation;Standing;Transfers; Walking; Wheelchair management   Plan   Treatment/Interventions Functional transfer training;LE strengthening/ROM; Therapeutic exercise; Endurance training;Gait training;Bed mobility   Progress Progressing toward goals   Recommendation   PT Discharge Recommendation   (tbd)   Equipment Recommended   (tbd)   PT Therapy Minutes   PT Time In 1220   PT Time Out 1330   PT Total Time (minutes) 70   PT Mode of treatment - Individual (minutes) 70   PT Mode of treatment - Concurrent (minutes) 0   PT Mode of treatment - Group (minutes) 0   PT Mode of treatment - Co-treat (minutes) 0   PT Mode of Treatment - Total time(minutes) 70 minutes   PT Cumulative Minutes 578   Therapy Time missed   Time missed?  No

## 2023-05-19 NOTE — LETTER
5/19/2023      MRN: [id number]        [address] [place] [id number]      You have been referred to our Cardiology department to be scheduled for a LOOP Recorder Explant procedure  We do not have any openings available at the [facility] to schedule this procedure but can schedule it at [facility] or [facility]  If these do not accommodate your need then I can add you to the [address] [place] [address] waitlist until an opening becomes available  Please call me at  to let me know which campus you prefer to schedule your procedure         Thank you,   Hinda Soulier  Surgery Coordinator  [facility]   [address] [place] [address], [address]  Ph: [phone]

## 2023-05-19 NOTE — PROGRESS NOTES
Internal Medicine Progress Note  Patient: Elyssa Torres  Age/sex: 58 y o  male  Medical Record #: [id number]      ASSESSMENT/PLAN: (Interval History)  Elyssa Torres is seen and examined and management for following issues:    Acute multifocal ischemic strokes  • Occurred in different arterial distributions  • IVY was negative for thrombus/PFO  • Felt not to be vasculitis and negative by a-gram 5/4/23  • Continue ASA/Brilinta/statin     Left ICA stenosis  • Was noted to have all of the CVAs in last month have been in left anterior circulation  • S/p PTA/stent 5/4/23  • Recheck carotid doppler 6 weeks and see NS  • Continue AP/statin     Right ICA stenosis  • To followup with Vasc surgery as OP  • Continue AP/statin     LOOP recorder  • Was placed 3/2019  • Neuro wants it to be replaced = for OP to Cardiology     Leukocytosis  • Had no fevers  • CXR and Procal were normal  • CBC today 5/19 same WBC at 14     HTN  • Home:  Toprol 100mg qd/Norvasc 10mg qd/Losartan 50mg qd/HCTZ 25mg qd/Hydralazine 25mg TID  • Here:  Toprol 100mg qd/Losartan 50mg qd  • HCTZ was decreased to 12 5mg qd before transfer here to [facility]; stopped beginning 5/16/23     Pre DM  • HbA1C 5 8  • Takes Metformin at home but currently not on it in hospital  • Wife didn't want him to have Accuchecks noting that he was pre-DM so they were stopped in hospital   The BSs had been very normal in acute anyway  • Will watch fastings with BMPs and maintain DM diet     Depression  • Continue Celexa as at home  • Wife thinks he is much more depressed than usual = I relayed this to Dr Aubrey Urbina     Memory loss  • Continue Aricept as at home  • He has had memory issues since his CVA in 2019     Urine retention  • Has jo placed 5/8/23  • VT 5/18  • Continue Flomax     Chronic LBP  • MRI showed advanced multilevel spondylosis, slightly progressed on the right at L2-3 compared to the prior study but otherwise stable     • Pain management per PMR     AAA  • Found on L-spine MRI  • Measures 3 4 cm  • OP followup     Hyponatremia  • Stopped HCTZ 5/16/23  • Resolved off of HCTZ and had been given IVF     Abnormal creatinine  • Stopped HCTZ  • On 5/15/23, gave 1 liter NSS x 1  • Now back to baseline        Discharge date:  [facility]       The above assessment and plan was reviewed and updated as determined by my evaluation of the patient on 5/19/2023      Labs:   Results from last 7 days   Lab Units 05/19/23  0618 05/15/23  0642   WBC Thousand/uL 14 14* 14 23*   HEMOGLOBIN g/dL 12 6 13 3   HEMATOCRIT % 37 3 37 9   PLATELETS Thousands/uL 316 352     Results from last 7 days   Lab Units 05/18/23  0606 05/16/23  0549   SODIUM mmol/L 137 133*   POTASSIUM mmol/L 4 3 3 9   CHLORIDE mmol/L 109* 106   CO2 mmol/L 26 25   BUN mg/dL 21 24   CREATININE mg/dL 1 02 1 03   CALCIUM mg/dL 9 7 9 1             Results from last 7 days   Lab Units 05/19/23  1045   POC GLUCOSE mg/dl 177*       Review of Scheduled Meds:  Current Facility-Administered Medications   Medication Dose Route Frequency Provider Last Rate   • acetaminophen  975 mg Oral TID PRN Aman Verdugo MD     • aspirin  81 mg Oral Daily Martinez Rabago, DO     • atorvastatin  40 mg Oral Daily Martinez Rabago, DO     • bisacodyl  10 mg Rectal Daily PRN Martinez Rabago, DO     • calcium carbonate  1,000 mg Oral Daily PRN Martinez Rabago, DO     • citalopram  20 mg Oral Daily Martinez Rabago, DO     • donepezil  5 mg Oral BID Martinez Rabago, DO     • heparin (porcine)  5,000 Units Subcutaneous [facility] Martinez Rabago, DO     • lidocaine  1 patch Topical Daily Martinez Rabago, DO     • lidocaine   Topical Q4H PRN Aman Verdugo MD     • losartan  50 mg Oral Daily ERIC Trotter     • methocarbamol  500 mg Oral Q6H PRN Martinez Rabago, DO     • metoprolol succinate  100 mg Oral Daily Martinez Rabago, DO     • ondansetron  4 mg Oral Q6H PRN Martinez Rabago, DO     • oxyCODONE  5 mg Oral Q6H PRN Martinez Rabago, DO     • oxyCODONE  2 5 mg Oral Q6H PRN Tere Slain, DO     • pantoprazole  20 mg Oral Early Morning Judsergio Slain, DO     • polyethylene glycol  17 g Oral QAM Tere Murrayin, DO     • senna  2 tablet Oral Daily PRN Tere Murrayin, DO     • senna-docusate sodium  2 tablet Oral BID Judsergio Murrayin, DO     • tamsulosin  0 8 mg Oral Daily With [address], CRNP     • ticagrelor  90 mg Oral Q12H [facility] Tere Rob, DO     • traZODone  50 mg Oral HS Jonathan Vera MD         Subjective/ HPI: Patient seen and examined  Patients overnight issues or events were reviewed with nursing or staff during rounds or morning huddle session  New or overnight issues include the following:     No new or overnight issues  Offers no complaints    ROS:   A 10 point ROS was performed; negative except as noted above         Imaging:     No orders to display       *Labs /Radiology studies reviewed  *Medications reviewed and reconciled as needed  *Please refer to order section for additional ordered labs studies  *Case discussed with primary attending during morning huddle case rounds    Physical Examination:  Vitals:   Vitals:    05/18/23 1449 05/18/23 2126 05/19/23 0602 05/19/23 0943   BP: 128/64 128/63 160/72 142/72   BP Location: Right arm Right arm Right arm    Pulse: 75 72 70 72   Resp: 19 20 18    Temp: 98 °F (36 7 °C) 97 9 °F (36 6 °C) 97 8 °F (36 6 °C)    TempSrc: Oral Oral Oral    SpO2: 97% 97% 99%    Weight:           General Appearance: no distress, conversive  HEENT: PERRLA, conjuctiva normal; oropharynx clear; mucous membranes moist   Neck:  Supple, normal ROM  Lungs: CTA, normal respiratory effort, no retractions, expiratory effort normal  CV: regular rate and rhythm; no rubs/murmurs/gallops, PMI normal   ABD: soft; ND/NT; +BS  EXT: no edema  Skin: normal turgor, normal texture, no rashes  Psych: affect normal, mood normal  Neuro: AA; +expressive > receptive aphasia but his aphasia has improved      The above physical exam was reviewed and updated as determined by my evaluation of the patient on 5/19/2023  Invasive Devices     Peripheral Intravenous Line  Duration           Peripheral IV 05/16/23 Dorsal (posterior); Left Hand 3 days                   VTE Pharmacologic Prophylaxis: Heparin  Code Status: Level 1 - Full Code  Current Length of Stay: 7 day(s)      Total time spent:  30 minutes with more than 50% spent counseling/coordinating care  Counseling includes discussion with patient re: progress  and discussion with patient of his/her current medical state/information  Coordination of patient's care was performed in conjunction with primary service  Time invested included review of patient's labs, vitals, and management of their comorbidities with continued monitoring  In addition, this patient was discussed with medical team including physician and advanced extenders  The care of the patient was extensively discussed and appropriate treatment plan was formulated unique for this patient  Medical decision making for the day was made by supervising physician unless otherwise noted in their attestation statement  ** Please Note:  voice to text software may have been used in the creation of this document   Although proof errors in transcription or interpretation are a potential of such software**

## 2023-05-20 PROCEDURE — 92507 TX SP LANG VOICE COMM INDIV: CPT

## 2023-05-20 PROCEDURE — 97112 NEUROMUSCULAR REEDUCATION: CPT

## 2023-05-20 PROCEDURE — 97530 THERAPEUTIC ACTIVITIES: CPT

## 2023-05-20 PROCEDURE — 99232 SBSQ HOSP IP/OBS MODERATE 35: CPT | Performed by: INTERNAL MEDICINE

## 2023-05-20 RX ADMIN — METOPROLOL SUCCINATE 100 MG: 100 TABLET, EXTENDED RELEASE ORAL at 09:18

## 2023-05-20 RX ADMIN — SENNOSIDES, DOCUSATE SODIUM 2 TABLET: 8.6; 5 TABLET ORAL at 09:18

## 2023-05-20 RX ADMIN — TRAZODONE HYDROCHLORIDE 50 MG: 50 TABLET ORAL at 21:33

## 2023-05-20 RX ADMIN — HEPARIN SODIUM 5000 UNITS: 5000 INJECTION INTRAVENOUS; SUBCUTANEOUS at 06:07

## 2023-05-20 RX ADMIN — TICAGRELOR 90 MG: 90 TABLET ORAL at 09:18

## 2023-05-20 RX ADMIN — ASPIRIN 81 MG: 81 TABLET, COATED ORAL at 09:17

## 2023-05-20 RX ADMIN — HEPARIN SODIUM 5000 UNITS: 5000 INJECTION INTRAVENOUS; SUBCUTANEOUS at 21:33

## 2023-05-20 RX ADMIN — TAMSULOSIN HYDROCHLORIDE 0.8 MG: 0.4 CAPSULE ORAL at 17:09

## 2023-05-20 RX ADMIN — HEPARIN SODIUM 5000 UNITS: 5000 INJECTION INTRAVENOUS; SUBCUTANEOUS at 14:25

## 2023-05-20 RX ADMIN — LOSARTAN POTASSIUM 50 MG: 50 TABLET, FILM COATED ORAL at 11:22

## 2023-05-20 RX ADMIN — TICAGRELOR 90 MG: 90 TABLET ORAL at 21:34

## 2023-05-20 RX ADMIN — PANTOPRAZOLE SODIUM 20 MG: 20 TABLET, DELAYED RELEASE ORAL at 06:07

## 2023-05-20 RX ADMIN — DONEPEZIL HYDROCHLORIDE 5 MG: 5 TABLET ORAL at 17:09

## 2023-05-20 RX ADMIN — DONEPEZIL HYDROCHLORIDE 5 MG: 5 TABLET ORAL at 09:18

## 2023-05-20 RX ADMIN — CITALOPRAM HYDROBROMIDE 20 MG: 20 TABLET ORAL at 09:17

## 2023-05-20 RX ADMIN — ATORVASTATIN CALCIUM 40 MG: 40 TABLET, FILM COATED ORAL at 09:17

## 2023-05-20 NOTE — PROGRESS NOTES
Internal Medicine Progress Note  Patient: Siobhan Sultana  Age/sex: 58 y o  male  Medical Record #: [id number]      ASSESSMENT/PLAN: (Interval History)  Siobhan Sultana is seen and examined and management for following issues:    Acute multifocal ischemic strokes  • Occurred in different arterial distributions  • IVY was negative for thrombus/PFO  • Felt not to be vasculitis and negative by a-gram 5/4/23  • Continue ASA/Brilinta/statin     Left ICA stenosis  • Was noted to have all of the CVAs in last month have been in left anterior circulation  • S/p PTA/stent 5/4/23  • Recheck carotid doppler 6 weeks and see NS  • Continue AP/statin     Right ICA stenosis  • To followup with Vasc surgery as OP  • Continue AP/statin     LOOP recorder  • Was placed 3/2019  • Neuro wants it to be replaced = for OP to Cardiology     Leukocytosis  • Had no fevers  • CXR and Procal were normal  • CBC today 5/19 same WBC at 14  • Cbc monday     HTN  • Home:  Toprol 100mg qd/Norvasc 10mg qd/Losartan 50mg qd/HCTZ 25mg qd/Hydralazine 25mg TID  • Here:  Toprol 100mg qd/Losartan 50mg qd  • stable       Pre DM  • HbA1C 5 8  • Takes Metformin at home but currently not on it in hospital  • accuchecks were stable and dc'd  • Monitor FBS on labs     Depression  • Continue Celexa as at home  • Recommend neuropsychology eval     Memory loss  • Continue Aricept as at home  • He has had memory issues since his CVA in 2019     Urine retention  • Has jo placed 5/8/23  • VT 5/18=failed  • Jo replaced by urology  • Continue Flomax     Chronic LBP  • MRI showed advanced multilevel spondylosis, slightly progressed on the right at L2-3 compared to the prior study but otherwise stable  • Pain management per PMR     AAA  • Found on L-spine MRI  • Measures 3 4 cm  • OP followup     Hyponatremia  • Stopped HCTZ 5/16/23    • Resolved off of HCTZ and had been given IVF           Discharge date:  [facility]       The above assessment and plan was reviewed and updated as determined by my evaluation of the patient on 5/20/2023      Labs:   Results from last 7 days   Lab Units 05/19/23  0618 05/15/23  0642   WBC Thousand/uL 14 14* 14 23*   HEMOGLOBIN g/dL 12 6 13 3   HEMATOCRIT % 37 3 37 9   PLATELETS Thousands/uL 316 352     Results from last 7 days   Lab Units 05/18/23  0606 05/16/23  0549   SODIUM mmol/L 137 133*   POTASSIUM mmol/L 4 3 3 9   CHLORIDE mmol/L 109* 106   CO2 mmol/L 26 25   BUN mg/dL 21 24   CREATININE mg/dL 1 02 1 03   CALCIUM mg/dL 9 7 9 1             Results from last 7 days   Lab Units 05/19/23  1045   POC GLUCOSE mg/dl 177*       Review of Scheduled Meds:  Current Facility-Administered Medications   Medication Dose Route Frequency Provider Last Rate   • acetaminophen  975 mg Oral TID PRN Ayo Lin MD     • aspirin  81 mg Oral Daily Mariposa Marker, DO     • atorvastatin  40 mg Oral Daily Mariposa Marker, DO     • bisacodyl  10 mg Rectal Daily PRN Mariposa Marker, DO     • calcium carbonate  1,000 mg Oral Daily PRN Mariposa Marker, DO     • citalopram  20 mg Oral Daily Mariposa Marker, DO     • donepezil  5 mg Oral BID Mariposa Marker, DO     • heparin (porcine)  5,000 Units Subcutaneous [facility] Mariposa Marker, DO     • lidocaine  1 patch Topical Daily Mariposa Marker, DO     • lidocaine   Topical Q4H PRN Ayo Lin MD     • losartan  50 mg Oral Daily ERIC Gagnon     • methocarbamol  500 mg Oral Q6H PRN Mariposa Marker, DO     • metoprolol succinate  100 mg Oral Daily Mariposa Marker, DO     • ondansetron  4 mg Oral Q6H PRN Mariposa Marker, DO     • oxyCODONE  5 mg Oral Q6H PRN Mariposa Marker, DO     • oxyCODONE  2 5 mg Oral Q6H PRN Mariposa Marker, DO     • pantoprazole  20 mg Oral Early Morning Mariposa Marker, DO     • polyethylene glycol  17 g Oral QAM Mariposa Marker, DO     • senna  2 tablet Oral Daily PRN Mariposa Marker, DO     • senna-docusate sodium  2 tablet Oral BID Mariposa Marker, DO     • tamsulosin  0 8 mg Oral Daily With [employer] ERIC Clemens     • ticagrelor  90 mg Oral Q12H [address] Azck Michelle, DO     • traZODone  50 mg Oral HS Monserrat Topete MD         Subjective/ HPI: Patient seen and examined  Patients overnight issues or events were reviewed with nursing or staff during rounds or morning huddle session  New or overnight issues include the following:     Pt seen at bedside  No overnight issues    ROS:   A 10 point ROS was performed; negative except as noted above  Imaging:     No orders to display       *Labs /Radiology studies reviewed  *Medications reviewed and reconciled as needed  *Please refer to order section for additional ordered labs studies  *Case discussed with primary attending during morning huddle case rounds    Physical Examination:  Vitals:   Vitals:    05/19/23 0943 05/19/23 1419 05/19/23 2149 05/20/23 0631   BP: 142/72 115/56 153/70 162/70   BP Location:  Left arm Right arm Right arm   Pulse: 72 76 68 76   Resp:  20 18 18   Temp:  98 °F (36 7 °C) 98 °F (36 7 °C) 97 6 °F (36 4 °C)   TempSrc:  Oral Oral Oral   SpO2:  97% 96% 95%   Weight:           GEN: NAD; pleasant  NEURO: Alert and aphasia expressive/receptive aphasia  HEENT: Pupils are equal/reactive; normocephalic, face is asymmetrical, hearing is normal  CV: S1 S2 regular, no murmur/rub/gallops, 2/4 pedal pulses, no edema present  RESP: Lungs are clear bilaterally, no wheezes rales or rhonchi, on room air, no distress, respirations are easy and non labored  GI: Abdomen is flat, soft, non tender, +BS x4; non distended  : jo draining clear yellow urine  MUSC: Moves all extremities except generalized weakness  SKIN: pink, warm, normal turgor, no rashes or lesions noted        The above physical exam was reviewed and updated as determined by my evaluation of the patient on 5/20/2023  Invasive Devices     Peripheral Intravenous Line  Duration           Peripheral IV 05/16/23 Dorsal (posterior); Left Hand 4 days          Drain  Duration Urethral Catheter Coude 16 Fr  <1 day                   VTE Pharmacologic Prophylaxis: Heparin  Code Status: Level 1 - Full Code  Current Length of Stay: 8 day(s)      Total time spent:  30 minutes with more than 50% spent counseling/coordinating care  Counseling includes discussion with patient re: progress  and discussion with patient of his/her current medical state/information  Coordination of patient's care was performed in conjunction with primary service  Time invested included review of patient's labs, vitals, and management of their comorbidities with continued monitoring  In addition, this patient was discussed with medical team including physician and advanced extenders  The care of the patient was extensively discussed and appropriate treatment plan was formulated unique for this patient  Medical decision making for the day was made by supervising physician unless otherwise noted in their attestation statement  ** Please Note:  voice to text software may have been used in the creation of this document   Although proof errors in transcription or interpretation are a potential of such software**

## 2023-05-20 NOTE — PROGRESS NOTES
"   05/20/23 0900   Pain Assessment   Pain Assessment Tool 0-10   Pain Score No Pain   Restrictions/Precautions   Precautions Aphasia; Aspiration;Bed/chair alarms;Cognitive; Fall Risk;Impulsive;Supervision on toilet/commode;Visual deficit   Comprehension   Comprehension (FIM) 3 - Understands basic info/conversation 50-74% of time   Expression   Expression (FIM) 2 - Uses only simple expressions or gestures (waves, hello)   Social Interaction   Social Interaction (FIM) 5 - Interacts appropriately with others 90% of time   Problem Solving   Problem solving (FIM) 2 - Needs direction more than ½ time to initiate, plan or complete simple tasks   Memory   Memory (FIM) 2 - Recognizes, recalls/performs 25-49%   Speech/Language/Cognition Assessmetn   Treatment Assessment Pt awake, alert and cooperative for session  As current SLP is new to pt's care, engaged in rapport building by review of orientation and biographical information  Additionally, pt engaged in multiple structured speech/langauge tasks as outlined below:    Orientation and LTM biographical Expression:   Pt was able to spontaneously state reason for current hospitalization, but had difficulty spontaneously eliciting place and month/year  When SLP provided pt w/ yes/no ?'s to ID current place, pt was accurate w/ hospital which then w/ further probing questions to elicit name of hospital, pt was able to state \"[facility]  \" As for attempts at current month, SLP provided 3 possible choices, to where pt continued to demonstrate difficulty in eliciting, requiring directive cues for month and year today  When asking about family, pt was able to elicit wife's name spontaneously, but increased time to state son, dtr and dog's names  However, when allowing that time, pt was able to provided the correct names accordingly  When SLP asked pt about grandchildren, he did confirm having them, but exhibited increased word finding in attempts to name his grandchildren   Of note, SLP " "asked pt if he had a \"cheatsheet\" given his information, which pt did fluently express \"it's around here somewhere\" to where SLp did locate item to help provide cues for pt to state names  SLP did provide associations for his children and to determine which grandchild belonged to the corresponding son or dtr  Given this cue, pt was able to state grandson's name w/o further cues, but for his 2 granddtrs names, SLP had to provide initial letter cues which pt was then able to verbalize their names accordingly  Nsg was present at mid point of session to provide medications to where pt continued to not be able to spontaneously state, needing phonemic cues for month, date and year of birthday  When attempting to have pt verbalize city which pt is currently living, pt was not able to name the city spontaneously but stated \"[place] from [place]  \" Additional probing clues did not produce spontaneous naming of city, to where SLP showed pt the cheatsheet w/ his address to where he was able to read to SLP  It was noted that pt was able to spontaneously provide prior work history of being a \"\" w/o difficulty today  Word Deduction Task:  SLP engaged pt in structured word deduction task to where 2 phrase given and object were presented to pt  Pt was to provide the name of the object being described  Ability for pt to elicit spontaneously the names of the objects when provided the 5/11 accurate, but when self correcting one response (as pt was aware of not saying correct word: squel-->scale), increased to 6/11 accuracy  When SLP provided repetition of the phrases, this did not increase pt's ability to provide the name of the target object, needing rephrasing and increased phrase completion cues for ability to name an additional 3 items increasing accuracy to 8/11  For the remaining clues, SLP did have to elicit phonemic cues to state the target names of the objects being described   Pt was noted to demonstrate " "mild frustration due to his inability to \"think of the word\" but SLP providing supportive education throughout task about how pt will fluctuate w/ overall spontaneous ability to name words  Auditory Comprehension Given Yes/No?'s  For the next activity completed, SLP providing pt a \"rest\" from expressive language skills, to where targeting auditory comprehension given yes/no ?'s which ranged from simple to moderately complex  The first set of questions elicited were simple orientation yes/no ?'s to where pt was 13/15 accurate  When answering simple general yes/no ?'s pt was 14/15 accurate in answering  When SLP provided pt w/ moderate yes/no ?'s, pt was 12/18 accurate, noting difficulty given the phrasing of questions (ie: do you eat breakfast after lunch? Do you put your shoes on after your socks?, etc)  When SLP did repeat these items, pt continued to demonstrate difficulty in comprehension given the before vs after phrasing  It is suspected a level of fatigue was occurring by this point in the session, which does carryover into next task  Divergent Naming Task of [place] Categories:  SLP lastly targeting spontaneous expression by attempting to have pt state at least 3 members of a particular category  Pt was able to spontaneously name only 2/6 items (1 word for each category-holidays, clothing), needing moderate semantic and phonemic cues to elicit the other 2 items per category  Again, suspect some level of fatigue as well as mild frustration by this point in the session, to where pt continuously states \"I can't think of it\" or \"I know what I want to say  \" SLP did attempt for pt to allow for other means of communicating the \"thoughts\" by encouraging gestures, describing item, but pt continued to exhibit a \"block\" given his ability to name items which would fit the categories      At this time, pt will continue to benefit from skilled SLP services targeting functional communication skills, comprehension " abilities as well as functional cognitive skills at this time in attempts to decrease overall caregiver burden over time  SLP Therapy Minutes   SLP Time In 0900   SLP Time Out 1000   SLP Total Time (minutes) 60   SLP Mode of treatment - Individual (minutes) 60   SLP Mode of treatment - Concurrent (minutes) 0   SLP Mode of treatment - Group (minutes) 0   SLP Mode of treatment - Co-treat (minutes) 0   SLP Mode of Treatment - Total time(minutes) 60 minutes   SLP Cumulative Minutes 430   Therapy Time missed   Time missed?  No

## 2023-05-20 NOTE — PROGRESS NOTES
"   05/20/23 3190   Pain Assessment   Pain Assessment Tool 0-10   Pain Score No Pain   Restrictions/Precautions   Precautions Aphasia; Aspiration;Bed/chair alarms;Cognitive; Fall Risk; Cortez; Impulsive;Supervision on toilet/commode;Visual deficit   Comprehension   Comprehension (FIM) 3 - Understands basic info/conversation 50-74% of time   Expression   Expression (FIM) 2 - Uses only simple expressions or gestures (waves, hello)   Social Interaction   Social Interaction (FIM) 4 - Interacts 75-89% of time   Problem Solving   Problem solving (FIM) 2 - Needs direction more than ½ time to initiate, plan or complete simple tasks   Memory   Memory (FIM) 2 - Recognizes, recalls/performs 25-49%   Speech/Language/Cognition Assessmetn   Treatment Assessment Pt was asleep upon arrival for PM session today, but was easily awakened by voice  However what was observed was more visual difficulty, scanning to the R side to initially locate SLP upon awakening  Even throughout the remainder of session, it was noted that pt's visual scanning was more decreased when engaging in object naming task, written tasks, etc  The following activities were targeted in this afternoon session as below:    Written Expression- Biographical Information and Automatics (Numbers)  SLP targeting written expression task to begin session where pt was to write out name  Pt did demonstrate ability to initially write the first 3 letters of his first name \"Ker\" but stopped self  Then when prompted to try again, pt was writing over the letter which he had just produced which made things more difficult to read  SLP wrote pt's first name out to copy, where some letters were more readable vs others (e, y) but then pt was noted to be perseverative in writing first name over and over but overall legibility was poor, again due to decreased spacing between letters written and on top of each other  Again, this is likely due to visual deficit to R side   SLP did ask pt to try " "to write  in numbers, which pt was not able to initiate  When SLP only wrote  for pt to copy, pt remained perseverative on writing first name, but then did write \"25\" eventually  SLP asking pt write out the numbers 1 through 10 tow here pt was able to spontaneously write 1, 2 only  It was noted that pt did have more frustration given his inability to complete writing at this time, but SLP providing education to pt about just as his difficulty in stating words, it does appear that he has difficulty in written expression as well  This will be followed up at a later time during pt's stay  Verbal Expression-Automatic Speech Drills and Biographical Info  SLP did continue to focus on more automatic speech output targeting verbal expression given counting  Pt was not able to independently initiate counting, but when SLP provided visual cue, pt began and counted 1-10 spontaneously  Similar initiation cues were needed for reciting SUZETTE and MARIA C, to where SLP providing phonemic cues for both and pt was able to state all 7/7  SUZETTE and 12/12 MARIA C  Circling back to biographical information, SLP providing pt w/ carrier phrases to elicit name and address which was successful vs just asking pt for that information  Pt was able to state full name and street address/city when provided the phrases to elicit that information  What pt continued to have increased difficulty w/ was verbalizing  as well as age  Pt required visual written cues for these items as the phrase and phonemic cues were not successful in producing information spontaneously  Object Naming Task-Items in the Room  Last task being tackled in session today was object naming provided common items located w/in pt's room  Again, pt's decreased visual scanning suspected to impact responses at times, needing more visual cues to locate items in room  Pt's ability to spontaneously name items was 2/10 accurate   Phonemic cue provided x1 due to close approximation " "made for comb (pt stated clomb), increasing to 3/10  When provided phrase cues to name objects, pt increased to 7/10 accurate and lastly when provided phrase and phonemic cues, pt did name the remaining 3 objects in the room  It is highly suspected that fatigue playing a factor given this task  Pt verbalizing to self many times \"Come on [place], you know this  \" SLP again providing pt w/ education given fluctuations given verbal speech output and as pt fatigues throughout the day, it will be harder  Pt does continue to verbalize that he wants to \"push through\" but even with all the pushing, more failure attempts could occur vs successful attempts  Pt verbalizing understanding of this and furthermore educated on needing REST  At this time, pt will continue to benefit from skilled SLP services targeting functional communication skills, comprehension abilities as well as functional cognitive skills at this time in attempts to decrease overall caregiver burden over time  SLP Therapy Minutes   SLP Time In 1430   SLP Time Out 0414   SLP Total Time (minutes) 45   SLP Mode of treatment - Individual (minutes) 45   SLP Mode of treatment - Concurrent (minutes) 0   SLP Mode of treatment - Group (minutes) 0   SLP Mode of treatment - Co-treat (minutes) 0   SLP Mode of Treatment - Total time(minutes) 45 minutes   SLP Cumulative Minutes 475   Therapy Time missed   Time missed?  No       "

## 2023-05-20 NOTE — PROGRESS NOTES
"   05/20/23 1035   Pain Assessment   Pain Assessment Tool 0-10   Pain Score No Pain   Restrictions/Precautions   Precautions Aphasia;Bed/chair alarms;Cognitive; Fall Risk;Impulsive;Supervision on toilet/commode;Visual deficit  (R inattention)   Weight Bearing Restrictions No   ROM Restrictions No   Lifestyle   Autonomy \"I get really   frustrated\"   Neuromuscular Education   Comments 3x10 chest press and bicep curls w/ 1 5# dowel and OTR providing guided resistance w/ use of yellow theraband  Req Min-Mod vc's to focus on pacing to improve coordination  Req cues to improve R visual tracking to inc R-attention  3x10 supination/pronation w/ use of teal flex bar focusing on b/l integration  Tolerated well w/ rest breaks between sets  Cognition   Overall Cognitive Status Impaired   Arousal/Participation Alert; Cooperative   Attention Difficulty attending to directions   Orientation Level Oriented to person;Oriented to place;Oriented to time   Memory Decreased short term memory;Decreased recall of recent events;Decreased recall of precautions   Following Commands Follows multistep commands with increased time or repetition   Comments Continues w/ Expressive > receptive aphasia, although some improvments since admission  Continues w/ poor motor planning  Additional Activities   Additional Activities Comments To challenge fxnl cog, visual skills provided pt w/ print out of map of [place] w/ counties labeled  Asked pt where he lives, be was able [place], [place] via paper in room, when asked if pt lives in [facility] [place] he enthusiastically states, [facility]  \" Asked pt to point to on map, pt first holding map upside down but able to self correct  He scanned the map, including the right side, but unabel to locate  When verbally discussing location pt agrees that it is in [facility], but unable to identify where despite being printed on map   OT highlighted county, encouraged pt to locate, he visually scanned on L side of map, " "cued pt w/ \"follow my finger\" as OT scanned finger to R side target, pt does appear to track, but when target should be in sight he shows no intiation of being able to identify  Additionally, cued pt t/o basic conversation regarding other places he lived in [place]  Pt also reports going to college in [place] but unable to state where  Pt continues w/ extensive aphasia  Mild frustration noted t/o  Activity Tolerance   Activity Tolerance Patient tolerated treatment well   Assessment   Treatment Assessment OT session focusing on R visual inattention and fxnl cog intervention, UE NMR w/ HOB elevated  Pt participated in bathing and dressing w/ nursing just prior to OT session and endorsing fatigue, but will to participate in OT session  Pt continues to require skilled hands on assist at this time  OT to continue to follow established POC  Problem List Decreased strength;Decreased range of motion;Decreased endurance; Impaired balance;Decreased mobility; Decreased coordination;Decreased cognition; Impaired judgement;Decreased safety awareness; Impaired vision; Impaired sensation;Obesity   Plan   Treatment/Interventions ADL retraining;Functional transfer training; Therapeutic exercise; Endurance training;Cognitive reorientation;Patient/family training;Equipment eval/education; Compensatory technique education;Continued evaluation   Progress Slow progress, cognitive deficits   OT Therapy Minutes   OT Time In 1035   OT Time Out 1135   OT Total Time (minutes) 60   OT Mode of treatment - Individual (minutes) 60   OT Mode of treatment - Concurrent (minutes) 0   OT Mode of treatment - Group (minutes) 0   OT Mode of treatment - Co-treat (minutes) 0   OT Mode of Treatment - Total time(minutes) 60 minutes   OT Cumulative Minutes 535       "

## 2023-05-20 NOTE — PLAN OF CARE
Reviewed    Problem: PAIN - ADULT  Goal: Verbalizes/displays adequate comfort level or baseline comfort level  Description: Interventions:  - Encourage patient to monitor pain and request assistance  - Assess pain using appropriate pain scale  - Administer analgesics based on type and severity of pain and evaluate response  - Implement non-pharmacological measures as appropriate and evaluate response  - Consider cultural and social influences on pain and pain management  - Notify physician/advanced practitioner if interventions unsuccessful or patient reports new pain  Outcome: Progressing     Problem: INFECTION - ADULT  Goal: Absence or prevention of progression during hospitalization  Description: INTERVENTIONS:  - Assess and monitor for signs and symptoms of infection  - Monitor lab/diagnostic results  - Monitor all insertion sites, i e  indwelling lines, tubes, and drains  - Monitor endotracheal if appropriate and nasal secretions for changes in amount and color  - [place] appropriate cooling/warming therapies per order  - Administer medications as ordered  - Instruct and encourage patient and family to use good hand hygiene technique  - Identify and instruct in appropriate isolation precautions for identified infection/condition  Outcome: Progressing  Goal: Absence of fever/infection during neutropenic period  Description: INTERVENTIONS:  - Monitor WBC    Outcome: Progressing     Problem: SAFETY ADULT  Goal: Patient will remain free of falls  Description: INTERVENTIONS:  - Educate patient/family on patient safety including physical limitations  - Instruct patient to call for assistance with activity   - Consult OT/PT to assist with strengthening/mobility   - Keep Call bell within reach  - Keep bed low and locked with side rails adjusted as appropriate  - Keep care items and personal belongings within reach  - Initiate and maintain comfort rounds  - Make Fall Risk Sign visible to staff  - Offer Toileting every 4 Hours, in advance of need  - Initiate/Maintain bed and chair alarm  - Apply yellow socks and bracelet for high fall risk patients  - Consider moving patient to room near nurses station  Outcome: Progressing  Goal: Maintain or return to baseline ADL function  Description: INTERVENTIONS:  -  Assess patient's ability to carry out ADLs; assess patient's baseline for ADL function and identify physical deficits which impact ability to perform ADLs (bathing, care of mouth/teeth, toileting, grooming, dressing, etc )  - Assess/evaluate cause of self-care deficits   - Assess range of motion  - Assess patient's mobility; develop plan if impaired  - Assess patient's need for assistive devices and provide as appropriate  - Encourage maximum independence but intervene and supervise when necessary  - Involve family in performance of ADLs  - Assess for home care needs following discharge   - Consider OT consult to assist with ADL evaluation and planning for discharge  - Provide patient education as appropriate  Outcome: Progressing  Goal: Maintains/Returns to pre admission functional level  Description: INTERVENTIONS:  - Set and communicate daily mobility goal to care team and patient/family/caregiver     - Collaborate with rehabilitation services on mobility goals if consulted  - Out of bed for toileting  - Record patient progress and toleration of activity level   Outcome: Progressing     Problem: DISCHARGE PLANNING  Goal: Discharge to home or other facility with appropriate resources  Description: INTERVENTIONS:  - Identify barriers to discharge w/patient and caregiver  - Arrange for needed discharge resources and transportation as appropriate  - Identify discharge learning needs (meds, wound care, etc )  - Arrange for interpretive services to assist at discharge as needed  - Refer to Case Management Department for coordinating discharge planning if the patient needs post-hospital services based on physician/advanced practitioner order or complex needs related to functional status, cognitive ability, or social support system  Outcome: Progressing     Problem: Prexisting or High Potential for Compromised Skin Integrity  Goal: Skin integrity is maintained or improved  Description: INTERVENTIONS:  - Identify patients at risk for skin breakdown  - Assess and monitor skin integrity  - Assess and monitor nutrition and hydration status  - Monitor labs   - Assess for incontinence   - Turn and reposition patient  - Assist with mobility/ambulation  - Relieve pressure over bony prominences  - Avoid friction and shearing  - Provide appropriate hygiene as needed including keeping skin clean and dry  - Evaluate need for skin moisturizer/barrier cream  - Collaborate with interdisciplinary team   - Patient/family teaching  - Consider wound care consult   Outcome: Progressing     Problem: MOBILITY - ADULT  Goal: Maintain or return to baseline ADL function  Description: INTERVENTIONS:  -  Assess patient's ability to carry out ADLs; assess patient's baseline for ADL function and identify physical deficits which impact ability to perform ADLs (bathing, care of mouth/teeth, toileting, grooming, dressing, etc )  - Assess/evaluate cause of self-care deficits   - Assess range of motion  - Assess patient's mobility; develop plan if impaired  - Assess patient's need for assistive devices and provide as appropriate  - Encourage maximum independence but intervene and supervise when necessary  - Involve family in performance of ADLs  - Assess for home care needs following discharge   - Consider OT consult to assist with ADL evaluation and planning for discharge  - Provide patient education as appropriate  Outcome: Progressing  Goal: Maintains/Returns to pre admission functional level  Description: INTERVENTIONS:  - Set and communicate daily mobility goal to care team and patient/family/caregiver     - Collaborate with rehabilitation services on mobility goals if consulted  - Out of bed for toileting  - Record patient progress and toleration of activity level   Outcome: Progressing     Problem: Nutrition/Hydration-ADULT  Goal: Nutrient/Hydration intake appropriate for improving, restoring or maintaining nutritional needs  Description: Monitor and assess patient's nutrition/hydration status for malnutrition  Collaborate with interdisciplinary team and initiate plan and interventions as ordered  Monitor patient's weight and dietary intake as ordered or per policy  Utilize nutrition screening tool and intervene as necessary  Determine patient's food preferences and provide high-protein, high-caloric foods as appropriate       INTERVENTIONS:  - Monitor oral intake, urinary output, labs, and treatment plans  - Assess nutrition and hydration status and recommend course of action  - Evaluate amount of meals eaten  - Assist patient with eating if necessary   - Allow adequate time for meals  - Recommend/ encourage appropriate diets, oral nutritional supplements, and vitamin/mineral supplements  - Order, calculate, and assess calorie counts as needed  - Recommend, monitor, and adjust tube feedings and TPN/PPN based on assessed needs  - Assess need for intravenous fluids  - Provide specific nutrition/hydration education as appropriate  - Include patient/family/caregiver in decisions related to nutrition  Outcome: Progressing

## 2023-05-21 PROCEDURE — 92507 TX SP LANG VOICE COMM INDIV: CPT

## 2023-05-21 PROCEDURE — 99232 SBSQ HOSP IP/OBS MODERATE 35: CPT | Performed by: INTERNAL MEDICINE

## 2023-05-21 PROCEDURE — 97112 NEUROMUSCULAR REEDUCATION: CPT

## 2023-05-21 PROCEDURE — 97116 GAIT TRAINING THERAPY: CPT

## 2023-05-21 PROCEDURE — 97530 THERAPEUTIC ACTIVITIES: CPT

## 2023-05-21 RX ADMIN — DONEPEZIL HYDROCHLORIDE 5 MG: 5 TABLET ORAL at 08:26

## 2023-05-21 RX ADMIN — CITALOPRAM HYDROBROMIDE 20 MG: 20 TABLET ORAL at 08:25

## 2023-05-21 RX ADMIN — LIDOCAINE 5% 1 PATCH: 700 PATCH TOPICAL at 08:23

## 2023-05-21 RX ADMIN — TAMSULOSIN HYDROCHLORIDE 0.8 MG: 0.4 CAPSULE ORAL at 17:17

## 2023-05-21 RX ADMIN — LOSARTAN POTASSIUM 50 MG: 50 TABLET, FILM COATED ORAL at 11:29

## 2023-05-21 RX ADMIN — PANTOPRAZOLE SODIUM 20 MG: 20 TABLET, DELAYED RELEASE ORAL at 05:28

## 2023-05-21 RX ADMIN — HEPARIN SODIUM 5000 UNITS: 5000 INJECTION INTRAVENOUS; SUBCUTANEOUS at 05:28

## 2023-05-21 RX ADMIN — ATORVASTATIN CALCIUM 40 MG: 40 TABLET, FILM COATED ORAL at 08:26

## 2023-05-21 RX ADMIN — TRAZODONE HYDROCHLORIDE 50 MG: 50 TABLET ORAL at 21:35

## 2023-05-21 RX ADMIN — HEPARIN SODIUM 5000 UNITS: 5000 INJECTION INTRAVENOUS; SUBCUTANEOUS at 14:59

## 2023-05-21 RX ADMIN — HEPARIN SODIUM 5000 UNITS: 5000 INJECTION INTRAVENOUS; SUBCUTANEOUS at 21:35

## 2023-05-21 RX ADMIN — METOPROLOL SUCCINATE 100 MG: 100 TABLET, EXTENDED RELEASE ORAL at 08:26

## 2023-05-21 RX ADMIN — TICAGRELOR 90 MG: 90 TABLET ORAL at 21:36

## 2023-05-21 RX ADMIN — TICAGRELOR 90 MG: 90 TABLET ORAL at 08:40

## 2023-05-21 RX ADMIN — ASPIRIN 81 MG: 81 TABLET, COATED ORAL at 08:26

## 2023-05-21 RX ADMIN — DONEPEZIL HYDROCHLORIDE 5 MG: 5 TABLET ORAL at 17:17

## 2023-05-21 NOTE — PROGRESS NOTES
"   05/21/23 7531   Pain Assessment   Pain Assessment Tool 0-10   Pain Score No Pain   Restrictions/Precautions   Precautions Aphasia; Aspiration;Bed/chair alarms;Cognitive; Fall Risk; Cortez; Impulsive;Supervision on toilet/commode;Visual deficit   Comprehension   Comprehension (FIM) 3 - Understands basic info/conversation 50-74% of time   Expression   Expression (FIM) 2 - Uses only simple expressions or gestures (waves, hello)   Social Interaction   Social Interaction (FIM) 5 - Interacts appropriately with others 90% of time   Problem Solving   Problem solving (FIM) 3 - Solves basic problmes 50-74% of time   Memory   Memory (FIM) 3 - Recognizes, recalls/performs 50-74%   Speech/Language/Cognition Assessmetn   Treatment Assessment Pt awake, alert and engaged for session today  In comparison to yesterday's afternoon session, pt was more awake and appeared to have better fluency in spontaneous conversational speech  SLP asking about family coming to visit yesterday as well as his dog, which he confirmed, to where pt was able to elicit shorter sentences about how his dog was behaved while visiting  At times, pt would demonstrate phonemic substitutions given words but overall content and words elicited were fluent and contextual  However, when asking pt about food items consumed at lunchtime meal today, again, pt demonstrated increased difficulty in expressing, to where pt elicited \"I can't tell you because I can't think of it  \" Even when SLP went through a number of food items which are on his preferred list as well as the lunch special or other options not on list, pt still stated no to these choices  SLP did [place] back 3 times in attempts to see if pt could express these items w/o success   Otherwise session focused on the following:    Verbal Expression- Biographical Information  SLP began structured tasks by reviewing bio info to where pt was able to spontaneously state full name and full address today when just " presented w/ the question to state this information  Pt continues to exhibit increased difficulty in spontaneously stating , continuing to require max cues to where today SLP nearly stating whole  for pt to repeat the correct items  Pt was able to spontaneously state wife, son and dtr's names w/o cues  Also today, pt was able to state 1 grandchild's name w/o cues (Gregorio), but when provided the association of who belongs to who, pt was then able to state [place] w/o further prompting but after increase wait time for other granddtr's name, SLP provided initial letter cue which he then was able to state  Phonemic cues were needed for saying dog's name as pt demonstrated phonemic substitutions Mary Buzzard -->brianda)  Verbal Expression-Opposite Phrase Completion  Next task which SLP engaged pt in was opposite phrase completion (ie: boys and  ___, up and ___)  On the initial round of this task, pt was able to spontaneously complete phrases w/ target words in 8 out 20 items  It was noted that pt would attempt to state a word but would stop self from full saying it in multiple occasions  When SLP repeated the carrier phrase only 1 time, pt was able to increase target words w/o further cues to 14/20 accuracy  The last 6 items, pt did demonstrate most difficulty in stating target words  However, pt would attempt to complete the phrases (ie: bread and salt or lock and see), exhibiting semantic errors, letter substitution errors or more observed was perseveration from response at the beginning of this task  SLP circled back to these items once all phrases were complete to where pt was able to hit target words this time increasing to 18/20 requiring phonemic cues for last 2 phrases  SLP completed this task again given another set of 20 opposite phrases  This time, pt's ability to spontaneously state target words to complete phrases was 11/20 accurate   Repeat of carrier phrase as well as visual cue was needed for 3 items, increasing to 14/20 accurate  Again, pt exhibiting difficulty given remaining target words, but when circling back, pt was able to immediately complete phrases increasing to 17/20  The last items pt was not able to elicit due to perseveration, phonemic substitutions and neologisms which were produced this round  Give pt's difficulty on these items, when pt would look to SLP's mouth for the phonemic cues, pt was able to elicit target words accordingly  Due to this, SLP suspecting some level of oral apraxia could be another factor which impacts expressive language and speech skills  When asking pt to complete Oral Motor Exam, it was noted that pt was able to follow commands for smile and stick out tongue  When asked to pucker, pt puffed out cheeks and continued to do this despite SLP demo the instruction  Additionally when asking pt to lateralize tongue from corner to corner, pt puffed out cheeks, making other facial gestures not related to lateralizing tongue  Even w/ SLP demo this, pt was exhibiting more labor in overall movement  Due to this, it is suspect that pt is demonstrating a component of oral apraxia when engaging in more structured verbal speech tasks  Reading Comprehension-Sentence to Picture Matching  Last task for session today was targeting pt's reading comprehension skills and ability to choose the picture (Fo2) which corresponds to the sentence provided  SLP is aware of pt's R inattention and likely visual field cut  To ensure that pt was scanning both pictures, SLP did point them out prior to pt reading sentence to self and then choosing  It was noted that on the initial 6 sentence-picture matches, pt was 3/6 accurate  For the last set of sentence to match to pictures, SLP verbally provided pt the sentence which pt was 2/2 accurate in ID of correct picture  Will need to continue to target pt's overall reading comprehension skills at this time   Of note, pt did fluently state at the end of this "session that \"you need to ask my wife to bring in my glasses  \"      At this time, pt will continue to benefit from skilled SLP services targeting functional communication skills, comprehension abilities as well as functional cognitive skills at this time in attempts to decrease overall caregiver burden over time  SLP Therapy Minutes   SLP Time In 6240   SLP Time Out 0433   SLP Total Time (minutes) 60   SLP Mode of treatment - Individual (minutes) 60   SLP Mode of treatment - Concurrent (minutes) 0   SLP Mode of treatment - Group (minutes) 0   SLP Mode of treatment - Co-treat (minutes) 0   SLP Mode of Treatment - Total time(minutes) 60 minutes   SLP Cumulative Minutes 535   Therapy Time missed   Time missed?  No       "

## 2023-05-21 NOTE — PROGRESS NOTES
Internal Medicine Progress Note  Patient: Elyssa Torres  Age/sex: 58 y o  male  Medical Record #: [id number]      ASSESSMENT/PLAN: (Interval History)  Elyssa Torres is seen and examined and management for following issues:    Acute multifocal ischemic strokes  • Occurred in different arterial distributions  • IVY was negative for thrombus/PFO  • Felt not to be vasculitis and negative by a-gram 5/4/23  • Continue ASA/Brilinta/statin     Left ICA stenosis  • Was noted to have all of the CVAs in last month have been in left anterior circulation  • S/p PTA/stent 5/4/23  • Recheck carotid doppler 6 weeks and see NS  • Continue AP/statin     Right ICA stenosis  • To followup with Vasc surgery as OP  • Continue AP/statin     LOOP recorder  • Was placed 3/2019  • Neuro wants it to be replaced = for OP to Cardiology     Leukocytosis  • Had no fevers  • CXR and Procal were normal  • CBC today 5/19 same WBC at 14  • Cbc monday     HTN  • Home:  Toprol 100mg qd/Norvasc 10mg qd/Losartan 50mg qd/HCTZ 25mg qd/Hydralazine 25mg TID  • Here:  Toprol 100mg qd/Losartan 50mg qd  • Trending up will add amlodipine 5mg daily with 130 as hold parameter to avoid low BP in setting of b/l stenosis       Pre DM  • HbA1C 5 8  • Takes Metformin at home but currently not on it in hospital  • accuchecks were stable and dc'd  • Monitor FBS on labs     Depression  • Continue Celexa as at home  • Recommend neuropsychology eval     Memory loss  • Continue Aricept as at home  • He has had memory issues since his CVA in 2019     Urine retention  • Has jo placed 5/8/23  • VT 5/18=failed  • Jo replaced by urology  • Continue Flomax     Chronic LBP  • MRI showed advanced multilevel spondylosis, slightly progressed on the right at L2-3 compared to the prior study but otherwise stable  • Pain management per PMR     AAA  • Found on L-spine MRI  • Measures 3 4 cm  • OP followup     Hyponatremia  • Stopped HCTZ 5/16/23    • Resolved off of HCTZ and had been given IVF           Discharge date:  [facility]       The above assessment and plan was reviewed and updated as determined by my evaluation of the patient on 5/21/2023      Labs:   Results from last 7 days   Lab Units 05/19/23  0618 05/15/23  0642   WBC Thousand/uL 14 14* 14 23*   HEMOGLOBIN g/dL 12 6 13 3   HEMATOCRIT % 37 3 37 9   PLATELETS Thousands/uL 316 352     Results from last 7 days   Lab Units 05/18/23  0606 05/16/23  0549   SODIUM mmol/L 137 133*   POTASSIUM mmol/L 4 3 3 9   CHLORIDE mmol/L 109* 106   CO2 mmol/L 26 25   BUN mg/dL 21 24   CREATININE mg/dL 1 02 1 03   CALCIUM mg/dL 9 7 9 1             Results from last 7 days   Lab Units 05/19/23  1045   POC GLUCOSE mg/dl 177*       Review of Scheduled Meds:  Current Facility-Administered Medications   Medication Dose Route Frequency Provider Last Rate   • acetaminophen  975 mg Oral TID PRN Dipti Hernandez MD     • aspirin  81 mg Oral Daily Chauncey Soto DO     • atorvastatin  40 mg Oral Daily Chauncey Soto DO     • bisacodyl  10 mg Rectal Daily PRN Chauncey Soto DO     • calcium carbonate  1,000 mg Oral Daily PRN Chauncey Soto DO     • citalopram  20 mg Oral Daily Chauncey Soto DO     • donepezil  5 mg Oral BID Chauncey Soto DO     • heparin (porcine)  5,000 Units Subcutaneous [facility] Chauncey Soto DO     • lidocaine  1 patch Topical Daily Chauncey Soto DO     • lidocaine   Topical Q4H PRN Dipti Hernandez MD     • losartan  50 mg Oral Daily ERIC Jauregui     • methocarbamol  500 mg Oral Q6H PRN Chauncey Soto DO     • metoprolol succinate  100 mg Oral Daily Chauncey Soto DO     • ondansetron  4 mg Oral Q6H PRN Chauncey Soto DO     • oxyCODONE  5 mg Oral Q6H PRN Chauncey Soto DO     • oxyCODONE  2 5 mg Oral Q6H PRN Chauncey Soto DO     • pantoprazole  20 mg Oral Early Morning Chauncey Soto DO     • polyethylene glycol  17 g Oral QAM Chauncey Soto DO     • senna  2 tablet Oral Daily PRN Chauncey Soto DO     • senna-docusate sodium  2 tablet Oral BID Sharlie Apley, DO     • tamsulosin  0 8 mg Oral Daily With 5 GunnerERIC Walters     • ticagrelor  90 mg Oral Q12H [facility] Sharlie Apley, DO     • traZODone  50 mg Oral HS Blas Fenton MD         Subjective/ HPI: Patient seen and examined  Patients overnight issues or events were reviewed with nursing or staff during rounds or morning huddle session  New or overnight issues include the following:     Pt seen at bedside  No issues overnight, able to answer simple questions appropriately    ROS:   A 10 point ROS was performed; negative except as noted above         Imaging:     No orders to display       *Labs /Radiology studies reviewed  *Medications reviewed and reconciled as needed  *Please refer to order section for additional ordered labs studies  *Case discussed with primary attending during morning huddle case rounds    Physical Examination:  Vitals:   Vitals:    05/20/23 0631 05/20/23 1352 05/20/23 2129 05/21/23 0557   BP: 162/70 152/78 148/78 147/67   BP Location: Right arm Left arm Right arm Left arm   Pulse: 76 84 80 66   Resp: 18 20 20 16   Temp: 97 6 °F (36 4 °C) 97 8 °F (36 6 °C) 98 °F (36 7 °C) 97 8 °F (36 6 °C)   TempSrc: Oral Oral Oral Oral   SpO2: 95% 97% 97% 99%   Weight:           GEN: NAD; pleasant  NEURO: Alert and aphasia expressive/receptive aphasia, able to answer simple questions this am  HEENT: Pupils are equal/reactive; normocephalic, face is asymmetrical, hearing is normal  CV: S1 S2 regular, no murmur/rub/gallops, 2/4 pedal pulses, no edema present  RESP: Lungs are clear bilaterally, no wheezes rales or rhonchi, on room air, no distress, respirations are easy and non labored  GI: Abdomen is flat, soft, non tender, +BS x4; non distended  : jo draining clear yellow urine  MUSC: Moves all extremities except generalized weakness  SKIN: pink, warm, normal turgor, no rashes or lesions noted        The above physical exam was reviewed and updated as determined by my evaluation of the patient on 5/21/2023  Invasive Devices     Drain  Duration           Urethral Catheter Coude 16 Fr  1 day                   VTE Pharmacologic Prophylaxis: Heparin  Code Status: Level 1 - Full Code  Current Length of Stay: 9 day(s)      Total time spent:  30 minutes with more than 50% spent counseling/coordinating care  Counseling includes discussion with patient re: progress  and discussion with patient of his/her current medical state/information  Coordination of patient's care was performed in conjunction with primary service  Time invested included review of patient's labs, vitals, and management of their comorbidities with continued monitoring  In addition, this patient was discussed with medical team including physician and advanced extenders  The care of the patient was extensively discussed and appropriate treatment plan was formulated unique for this patient  Medical decision making for the day was made by supervising physician unless otherwise noted in their attestation statement  ** Please Note:  voice to text software may have been used in the creation of this document   Although proof errors in transcription or interpretation are a potential of such software**

## 2023-05-21 NOTE — PROGRESS NOTES
"OT Daily Treatment Note       05/21/23 1230   Pain Assessment   Pain Assessment Tool 0-10   Pain Score No Pain   Restrictions/Precautions   Precautions Aphasia; Aspiration;Bed/chair alarms;Cognitive; Fall Risk; [place]; Impulsive;Supervision on toilet/commode   Lifestyle   Autonomy \"I can see how words hard for me\"   Exercise Tools   Exercise Tools Yes   Hand Gripper pt engaged in activity with 5 lb digi flex hand gripper focusing on gross grasp strength, gross motor coordination and functional grasp/release to increase independence with ADL tasks including self-feeding  pt completed 15x3 initially requring hand over hand cueing to coordinate placing each finger on correct pin  after first set, pt able to complete with min vc  pt observed to releasing digi flex completely, falling out of pts hand - with cues, able to keep thumb in place to prevent dropping  Cognition   Overall Cognitive Status Impaired   Arousal/Participation Cooperative   Attention Difficulty attending to directions   Orientation Level Oriented to person;Oriented to place;Oriented to time   Memory Decreased short term memory;Decreased recall of recent events;Decreased recall of precautions   Following Commands Follows multistep commands with increased time or repetition   Additional Activities   Additional Activities Other (Comment)   Additional Activities Comments pt engaged in object finding/naming activity focusing on language, communication, R attention and visual scanning  pt initially shown various household items (placed to the patients right) and instructed to name each item  pt able to name 13/22 items  9 other items required phonetic cueing or pt was unable to verbalize at all  OT then provided pt with basin full of different household/medical items - pt instructed to  item with R hand and say item name outloud   pt did demo mild difficulty with motor planning and was observed picking up multiple items at once but he was able to " realize this and would drop all items until he was able to pick one up at time  pt able to name 9/19 items  OT believes that his dec ability to name during this 2nd round was due to the dual task of picking up the item and then naming it as he did do better when OT held the item for the pt and all he had to focus on was naming it  This basin of items was left in the pts room with a note instructing staff/family/etc to encourage the pt to complete this activity outside of therapy hours  pt then engaged in naming colored cones with cones placed to the pts far R  pt req max vc to scan far R and req phoenetic cueing for the colors ~75% of the time as the pt would perseverate on one color and name all the cones the same color  pt would benefit from continued practice with visual scanning and item naming  Activity Tolerance   Activity Tolerance Patient tolerated treatment well   Medical Staff Made Aware GARRETT Méndez made aware of pts transfer status chance  Nursing ok to complete transfers with A x 2 to the L only and swap out method by pulling up from bedrail to use [facility] for toileting  Board updated and treatment team sticky note also updated  Assessment   Treatment Assessment Pt participated in skilled OT session with focus on cognitive reorientation, neuro re-ed and activity engagement   See flowsheet for details of session and current functional status  Pt is limited by weakness, decreased ROM, impaired balance, decreased endurance, decreased coordination, increased fall risk, decreased ADLS, decreased activity tolerance, decreased safety awareness, impaired judgement, decreased cognition, decreased strength and visual deficits and requires skilled OT services to increase independence and safety with ADL completion in prep for DC location pending progress   Plan to continue ADL retraining, functional transfer training, UE strengthening/ROM, endurance training, cognitive reorientation, Pt/caregiver education, equipment evaluation/education, neuro re-ed, fine motor coordination activities, compensatory technique education, continued education, energy conservation and activity engagement  to address barriers mentioned above  Prognosis Fair   Problem List Decreased strength;Decreased range of motion;Decreased endurance; Impaired balance;Decreased mobility; Decreased coordination;Decreased cognition; Impaired judgement;Decreased safety awareness; Impaired vision; Impaired sensation; Impaired tone   Barriers to Discharge Inaccessible home environment;Decreased caregiver support   Plan   Treatment/Interventions ADL retraining;Functional transfer training; Therapeutic exercise; Endurance training;Cognitive reorientation;Patient/family training; Compensatory technique education   Progress Progressing toward goals   OT Therapy Minutes   OT Time In 1230   OT Time Out 1330   OT Total Time (minutes) 60   OT Mode of treatment - Individual (minutes) 60   OT Mode of treatment - Concurrent (minutes) 0   OT Mode of treatment - Group (minutes) 0   OT Mode of treatment - Co-treat (minutes) 0   OT Mode of Treatment - Total time(minutes) 60 minutes   OT Cumulative Minutes 595   Therapy Time missed   Time missed?  No

## 2023-05-21 NOTE — PLAN OF CARE
Reviewed    Problem: PAIN - ADULT  Goal: Verbalizes/displays adequate comfort level or baseline comfort level  Description: Interventions:  - Encourage patient to monitor pain and request assistance  - Assess pain using appropriate pain scale  - Administer analgesics based on type and severity of pain and evaluate response  - Implement non-pharmacological measures as appropriate and evaluate response  - Consider cultural and social influences on pain and pain management  - Notify physician/advanced practitioner if interventions unsuccessful or patient reports new pain  Outcome: Progressing     Problem: INFECTION - ADULT  Goal: Absence or prevention of progression during hospitalization  Description: INTERVENTIONS:  - Assess and monitor for signs and symptoms of infection  - Monitor lab/diagnostic results  - Monitor all insertion sites, i e  indwelling lines, tubes, and drains  - Monitor endotracheal if appropriate and nasal secretions for changes in amount and color  - [place]ell appropriate cooling/warming therapies per order  - Administer medications as ordered  - Instruct and encourage patient and family to use good hand hygiene technique  - Identify and instruct in appropriate isolation precautions for identified infection/condition  Outcome: Progressing  Goal: Absence of fever/infection during neutropenic period  Description: INTERVENTIONS:  - Monitor WBC    Outcome: Progressing     Problem: SAFETY ADULT  Goal: Patient will remain free of falls  Description: INTERVENTIONS:  - Educate patient/family on patient safety including physical limitations  - Instruct patient to call for assistance with activity   - Consult OT/PT to assist with strengthening/mobility   - Keep Call bell within reach  - Keep bed low and locked with side rails adjusted as appropriate  - Keep care items and personal belongings within reach  - Initiate and maintain comfort rounds  - Make Fall Risk Sign visible to staff  - Offer Toileting every 4 Hours, in advance of need  - Initiate/Maintain bed and chair alarm  - Apply yellow socks and bracelet for high fall risk patients  - Consider moving patient to room near nurses station  Outcome: Progressing  Goal: Maintain or return to baseline ADL function  Description: INTERVENTIONS:  -  Assess patient's ability to carry out ADLs; assess patient's baseline for ADL function and identify physical deficits which impact ability to perform ADLs (bathing, care of mouth/teeth, toileting, grooming, dressing, etc )  - Assess/evaluate cause of self-care deficits   - Assess range of motion  - Assess patient's mobility; develop plan if impaired  - Assess patient's need for assistive devices and provide as appropriate  - Encourage maximum independence but intervene and supervise when necessary  - Involve family in performance of ADLs  - Assess for home care needs following discharge   - Consider OT consult to assist with ADL evaluation and planning for discharge  - Provide patient education as appropriate  Outcome: Progressing  Goal: Maintains/Returns to pre admission functional level  Description: INTERVENTIONS:  - Set and communicate daily mobility goal to care team and patient/family/caregiver     - Collaborate with rehabilitation services on mobility goals if consulted  - Out of bed for toileting  - Record patient progress and toleration of activity level   Outcome: Progressing     Problem: DISCHARGE PLANNING  Goal: Discharge to home or other facility with appropriate resources  Description: INTERVENTIONS:  - Identify barriers to discharge w/patient and caregiver  - Arrange for needed discharge resources and transportation as appropriate  - Identify discharge learning needs (meds, wound care, etc )  - Arrange for interpretive services to assist at discharge as needed  - Refer to Case Management Department for coordinating discharge planning if the patient needs post-hospital services based on physician/advanced practitioner order or complex needs related to functional status, cognitive ability, or social support system  Outcome: Progressing     Problem: Prexisting or High Potential for Compromised Skin Integrity  Goal: Skin integrity is maintained or improved  Description: INTERVENTIONS:  - Identify patients at risk for skin breakdown  - Assess and monitor skin integrity  - Assess and monitor nutrition and hydration status  - Monitor labs   - Assess for incontinence   - Turn and reposition patient  - Assist with mobility/ambulation  - Relieve pressure over bony prominences  - Avoid friction and shearing  - Provide appropriate hygiene as needed including keeping skin clean and dry  - Evaluate need for skin moisturizer/barrier cream  - Collaborate with interdisciplinary team   - Patient/family teaching  - Consider wound care consult   Outcome: Progressing     Problem: MOBILITY - ADULT  Goal: Maintain or return to baseline ADL function  Description: INTERVENTIONS:  -  Assess patient's ability to carry out ADLs; assess patient's baseline for ADL function and identify physical deficits which impact ability to perform ADLs (bathing, care of mouth/teeth, toileting, grooming, dressing, etc )  - Assess/evaluate cause of self-care deficits   - Assess range of motion  - Assess patient's mobility; develop plan if impaired  - Assess patient's need for assistive devices and provide as appropriate  - Encourage maximum independence but intervene and supervise when necessary  - Involve family in performance of ADLs  - Assess for home care needs following discharge   - Consider OT consult to assist with ADL evaluation and planning for discharge  - Provide patient education as appropriate  Outcome: Progressing  Goal: Maintains/Returns to pre admission functional level  Description: INTERVENTIONS:  - Set and communicate daily mobility goal to care team and patient/family/caregiver     - Collaborate with rehabilitation services on mobility goals if consulted  - Out of bed for toileting  - Record patient progress and toleration of activity level   Outcome: Progressing     Problem: Nutrition/Hydration-ADULT  Goal: Nutrient/Hydration intake appropriate for improving, restoring or maintaining nutritional needs  Description: Monitor and assess patient's nutrition/hydration status for malnutrition  Collaborate with interdisciplinary team and initiate plan and interventions as ordered  Monitor patient's weight and dietary intake as ordered or per policy  Utilize nutrition screening tool and intervene as necessary  Determine patient's food preferences and provide high-protein, high-caloric foods as appropriate       INTERVENTIONS:  - Monitor oral intake, urinary output, labs, and treatment plans  - Assess nutrition and hydration status and recommend course of action  - Evaluate amount of meals eaten  - Assist patient with eating if necessary   - Allow adequate time for meals  - Recommend/ encourage appropriate diets, oral nutritional supplements, and vitamin/mineral supplements  - Order, calculate, and assess calorie counts as needed  - Recommend, monitor, and adjust tube feedings and TPN/PPN based on assessed needs  - Assess need for intravenous fluids  - Provide specific nutrition/hydration education as appropriate  - Include patient/family/caregiver in decisions related to nutrition  Outcome: Progressing

## 2023-05-21 NOTE — PROGRESS NOTES
05/21/23 0930   Pain Assessment   Pain Assessment Tool 0-10   Pain Score No Pain  (no pain at rest, did have occ muscle spasm in R LE with activity)   Restrictions/Precautions   Precautions Aphasia; Aspiration;Bed/chair alarms;Cognitive; Fall Risk; Cortez; Impulsive;Supervision on toilet/commode;Visual deficit   Weight Bearing Restrictions No   ROM Restrictions No   Cognition   Overall Cognitive Status Impaired   Arousal/Participation Cooperative   Attention Difficulty attending to directions   Subjective   Subjective Per pt/nursing, pt was not OOB yesterday  Pt seen supine in bed and agreeable to OOB mobility with PT     Roll Left and Right   Type of Assistance Needed Supervision   Roll Left and Right CARE Score 4   Sit to Lying   Type of Assistance Needed Physical assistance   Physical Assistance Level 26%-50%   Sit to Lying CARE Score 3   Lying to Sitting on Side of Bed   Type of Assistance Needed Physical assistance   Physical Assistance Level 26%-50%   Comment very impulsive when attempting to return to bed, cued/assisted for safety   Lying to Sitting on Side of Bed CARE Score 3   Sit to Stand   Type of Assistance Needed Physical assistance   Physical Assistance Level Total assistance   Comment minAx2 for safety from EOB and from WC   Sit to Stand CARE Score 1   Bed-Chair Transfer   Type of Assistance Needed Physical assistance   Physical Assistance Level Total assistance   Comment modAx2  (SPT from bed <> WC towards L, cues for hand placement, weight shifting, and stepping)   Chair/Bed-to-Chair Transfer CARE Score 1   Walk 10 Feet   Comment did not score secondary to using BWSS harness   Walk 50 Feet with Two Turns   Comment did not score secondary to using BWSS harness   Walk 150 Feet   Comment did not score secondary to using BWSS harness   Walking 10 Feet on Uneven Surfaces   Reason if not Attempted Safety concerns   Walking 10 Feet on Uneven Surfaces CARE Score 88   Ambulation   Primary Mode of Locomotion Prior to Admission Walk   Distance Walked (feet) 180 ft  (90)   Assist Device   (min-modAx2 b/l HHA + BWSS [place])   Gait Pattern Ataxic; Inconsistant Deepti; Slow Deepti;Decreased foot clearance; Improper weight shift;Decreased L stance  (Cued for midline orientation to weight shift towards L)   Limitations Noted In Balance; Coordination; Endurance; Heel Strike;Midline Orientation;Posture; Safety; Sequencing;Speed;Strength;Swing   Provided Assistance with: Balance;Weight Shift;Trunk Support   Walk Assist Level   (min-modAx2 HHA in [employer])   Findings Difficulty achieving midline and shifting towards LE, decreased L step length but able to safely ambulate Ax2 in harness with no LOB   Does the patient walk? 2  Yes   Assessment   Treatment Assessment Pt pleasant and actively participated in skilled PT session  Pt required simple cues to attend and redirection during tasks to reduce episodes of impulsivity  Pt performed SPT towards L from EOB <> WC with modAx2 with cues on hand placement and pivoting  Pt able to perform bed mobility Ax1 although cues for sequencing and to safely attend to task as he became very impulsive when returning to supine position s/p transfer from WC > EOB  Increased time required to tereza and adjust BWSS blue harness, but pt able to ambulate 180ft and 90ft with HHAx2 with cues to increase midline orientation as pt has difficulty shifting to the L and also advancing his L LE as he fatigued  No major LOB but repeated assist and cues for upright postural alignment as he was weightshifted to the R  Pt able to safely pivot tx towards the L 2x today  Pt wanted to return to bed at end of session and did not want to stay up in the [facility] or the recliner chair  Limited OOB mobility as he was not OOB yesterday and wanted to be OOB today only for PT session   Pt is progressing towards goals with D/C TBD depending on continued progression as his impaired cognition, decreased coordination, and high fall risk is limiting is ability to safely d/c home  Problem List Decreased strength;Decreased range of motion;Decreased endurance; Impaired balance;Decreased mobility; Decreased coordination;Decreased cognition; Impaired judgement;Decreased safety awareness; Impaired vision; Impaired sensation; Impaired tone   Barriers to Discharge Inaccessible home environment;Decreased caregiver support   PT Barriers   Physical Impairment Decreased strength;Decreased range of motion;Decreased endurance; Impaired balance;Decreased mobility; Decreased coordination;Decreased cognition; Impaired judgement;Decreased safety awareness; Impaired vision; Impaired sensation; Impaired tone   Functional Limitation Car transfers;Stair negotiation;Ramp negotiation;Standing;Transfers; Walking; Wheelchair management   Plan   Treatment/Interventions LE strengthening/ROM; Functional transfer training;Elevations; Therapeutic exercise; Endurance training;Patient/family training;Equipment eval/education; Bed mobility;Gait training   Progress Progressing toward goals   PT Therapy Minutes   PT Time In 0930   PT Time Out 1035   PT Total Time (minutes) 65   PT Mode of treatment - Individual (minutes) 65   PT Mode of treatment - Concurrent (minutes) 0   PT Mode of treatment - Group (minutes) 0   PT Mode of treatment - Co-treat (minutes) 0   PT Mode of Treatment - Total time(minutes) 65 minutes   PT Cumulative Minutes 643   Therapy Time missed   Time missed?  No

## 2023-05-22 LAB
ANION GAP SERPL CALCULATED.3IONS-SCNC: 2 MMOL/L (ref 4–13)
BASOPHILS # BLD AUTO: 0.2 THOUSANDS/ÂΜL (ref 0–0.1)
BASOPHILS NFR BLD AUTO: 2 % (ref 0–1)
BUN SERPL-MCNC: 16 MG/DL (ref 5–25)
CALCIUM SERPL-MCNC: 9.1 MG/DL (ref 8.3–10.1)
CHLORIDE SERPL-SCNC: 110 MMOL/L (ref 96–108)
CO2 SERPL-SCNC: 25 MMOL/L (ref 21–32)
CREAT SERPL-MCNC: 1.03 MG/DL (ref 0.6–1.3)
EOSINOPHIL # BLD AUTO: 1.13 THOUSAND/ÂΜL (ref 0–0.61)
EOSINOPHIL NFR BLD AUTO: 9 % (ref 0–6)
ERYTHROCYTE [DISTWIDTH] IN BLOOD BY AUTOMATED COUNT: 13.7 % (ref 11.6–15.1)
GFR SERPL CREATININE-BSD FRML MDRD: 76 ML/MIN/1.73SQ M
GLUCOSE P FAST SERPL-MCNC: 90 MG/DL (ref 65–99)
GLUCOSE SERPL-MCNC: 90 MG/DL (ref 65–140)
HCT VFR BLD AUTO: 36.3 % (ref 36.5–49.3)
HGB BLD-MCNC: 12.1 G/DL (ref 12–17)
IMM GRANULOCYTES # BLD AUTO: 0.14 THOUSAND/UL (ref 0–0.2)
IMM GRANULOCYTES NFR BLD AUTO: 1 % (ref 0–2)
LYMPHOCYTES # BLD AUTO: 3.45 THOUSANDS/ÂΜL (ref 0.6–4.47)
LYMPHOCYTES NFR BLD AUTO: 27 % (ref 14–44)
MCH RBC QN AUTO: 32.5 PG (ref 26.8–34.3)
MCHC RBC AUTO-ENTMCNC: 33.3 G/DL (ref 31.4–37.4)
MCV RBC AUTO: 98 FL (ref 82–98)
MONOCYTES # BLD AUTO: 1.08 THOUSAND/ÂΜL (ref 0.17–1.22)
MONOCYTES NFR BLD AUTO: 8 % (ref 4–12)
NEUTROPHILS # BLD AUTO: 6.96 THOUSANDS/ÂΜL (ref 1.85–7.62)
NEUTS SEG NFR BLD AUTO: 53 % (ref 43–75)
NRBC BLD AUTO-RTO: 0 /100 WBCS
PLATELET # BLD AUTO: 294 THOUSANDS/UL (ref 149–390)
PMV BLD AUTO: 9.9 FL (ref 8.9–12.7)
POTASSIUM SERPL-SCNC: 4 MMOL/L (ref 3.5–5.3)
RBC # BLD AUTO: 3.72 MILLION/UL (ref 3.88–5.62)
SODIUM SERPL-SCNC: 137 MMOL/L (ref 135–147)
WBC # BLD AUTO: 12.96 THOUSAND/UL (ref 4.31–10.16)

## 2023-05-22 PROCEDURE — 99232 SBSQ HOSP IP/OBS MODERATE 35: CPT | Performed by: INTERNAL MEDICINE

## 2023-05-22 PROCEDURE — 85025 COMPLETE CBC W/AUTO DIFF WBC: CPT | Performed by: NURSE PRACTITIONER

## 2023-05-22 PROCEDURE — 99232 SBSQ HOSP IP/OBS MODERATE 35: CPT

## 2023-05-22 PROCEDURE — 92507 TX SP LANG VOICE COMM INDIV: CPT

## 2023-05-22 PROCEDURE — 80048 BASIC METABOLIC PNL TOTAL CA: CPT | Performed by: NURSE PRACTITIONER

## 2023-05-22 PROCEDURE — 97112 NEUROMUSCULAR REEDUCATION: CPT

## 2023-05-22 PROCEDURE — 97535 SELF CARE MNGMENT TRAINING: CPT

## 2023-05-22 PROCEDURE — 97129 THER IVNTJ 1ST 15 MIN: CPT

## 2023-05-22 RX ORDER — LOSARTAN POTASSIUM 50 MG/1
50 TABLET ORAL DAILY
Status: DISCONTINUED | OUTPATIENT
Start: 2023-05-23 | End: 2023-05-31

## 2023-05-22 RX ADMIN — DONEPEZIL HYDROCHLORIDE 5 MG: 5 TABLET ORAL at 08:51

## 2023-05-22 RX ADMIN — HEPARIN SODIUM 5000 UNITS: 5000 INJECTION INTRAVENOUS; SUBCUTANEOUS at 21:06

## 2023-05-22 RX ADMIN — ATORVASTATIN CALCIUM 40 MG: 40 TABLET, FILM COATED ORAL at 08:50

## 2023-05-22 RX ADMIN — LOSARTAN POTASSIUM 50 MG: 50 TABLET, FILM COATED ORAL at 11:25

## 2023-05-22 RX ADMIN — METOPROLOL SUCCINATE 100 MG: 100 TABLET, EXTENDED RELEASE ORAL at 08:51

## 2023-05-22 RX ADMIN — LIDOCAINE 5% 1 PATCH: 700 PATCH TOPICAL at 08:51

## 2023-05-22 RX ADMIN — TRAZODONE HYDROCHLORIDE 50 MG: 50 TABLET ORAL at 21:05

## 2023-05-22 RX ADMIN — TAMSULOSIN HYDROCHLORIDE 0.8 MG: 0.4 CAPSULE ORAL at 17:01

## 2023-05-22 RX ADMIN — TICAGRELOR 90 MG: 90 TABLET ORAL at 21:05

## 2023-05-22 RX ADMIN — PANTOPRAZOLE SODIUM 20 MG: 20 TABLET, DELAYED RELEASE ORAL at 06:38

## 2023-05-22 RX ADMIN — CITALOPRAM HYDROBROMIDE 20 MG: 20 TABLET ORAL at 08:51

## 2023-05-22 RX ADMIN — SENNOSIDES, DOCUSATE SODIUM 2 TABLET: 8.6; 5 TABLET ORAL at 08:51

## 2023-05-22 RX ADMIN — TICAGRELOR 90 MG: 90 TABLET ORAL at 08:51

## 2023-05-22 RX ADMIN — POLYETHYLENE GLYCOL 3350 17 G: 17 POWDER, FOR SOLUTION ORAL at 08:51

## 2023-05-22 RX ADMIN — HEPARIN SODIUM 5000 UNITS: 5000 INJECTION INTRAVENOUS; SUBCUTANEOUS at 06:38

## 2023-05-22 RX ADMIN — ASPIRIN 81 MG: 81 TABLET, COATED ORAL at 08:51

## 2023-05-22 RX ADMIN — SENNOSIDES, DOCUSATE SODIUM 2 TABLET: 8.6; 5 TABLET ORAL at 17:02

## 2023-05-22 RX ADMIN — HEPARIN SODIUM 5000 UNITS: 5000 INJECTION INTRAVENOUS; SUBCUTANEOUS at 13:18

## 2023-05-22 RX ADMIN — DONEPEZIL HYDROCHLORIDE 5 MG: 5 TABLET ORAL at 17:02

## 2023-05-22 NOTE — PROGRESS NOTES
"OT Treatment Note       05/22/23 0700   Pain Assessment   Pain Assessment Tool 0-10   Restrictions/Precautions   Precautions Aphasia; Aspiration;Bed/chair alarms;Cognitive; Fall Risk; Cortez; Impulsive;Supervision on toilet/commode;Visual deficit   Weight Bearing Restrictions No   ROM Restrictions No   Lifestyle   Autonomy \"You're not kidding,\" endorsing frustration with aphasia with naming task   Eating   Type of Assistance Needed Physical assistance   Physical Assistance Level 26%-50%   Comment Pt self-feeding omlette, banana, and milk  Pt requiring setup to cut omlette and open milk container  Pt able to peel banana independently  Pt requiring modA with scooping of omlette onto fork, requiring assistance for ~50% of task  Eating CARE Score 3   Oral Hygiene   Type of Assistance Needed Supervision   Physical Assistance Level No physical assistance   Comment vc's for opening of toothpaste, requires setup to assist in applying toothpaste to toothbrush, then able to brush teeth seated in bed     Oral Hygiene CARE Score 4   Grooming   Able To Comb/Brush Hair;Wash/Dry Face;Brush/Clean Teeth;Wash/Dry Hands   Findings sup sitting up in bed   Shower/Bathe Self   Type of Assistance Needed Physical assistance   Physical Assistance Level 26%-50%   Comment SB requiring modA at bed level, requiring assistance to fully wash buttocks and distal LEs  min vc's for prompting/sequencing tasks   Shower/Bathe Self CARE Score 3   Bathing   Assessed Bath Style Sponge Bath   Positioning Seated;Supine   Upper Body Dressing   Type of Assistance Needed Physical assistance   Physical Assistance Level 25% or less   Comment for OH tshirt to fully doff off RUE and to fully pull down trunk when donning   Upper Body Dressing CARE Score 3   Lower Body Dressing   Type of Assistance Needed Physical assistance   Physical Assistance Level 76% or more   Comment at bed level, pt requiring TA with tabbed brief, requires assistance to thread BLE and catheter " "through pant legs, but able to hike pants over hips via bridging with Jacky   Lower Body Dressing CARE Score 2   Putting On/Taking Off Footwear   Type of Assistance Needed Physical assistance   Physical Assistance Level 26%-50%   Comment sitting upright in bed, pt able to doff socks using xleg technique with encouragement; requires TA to don socks   Putting On/Taking Off Footwear CARE Score 3   Roll Left and Right   Type of Assistance Needed Supervision   Physical Assistance Level No physical assistance   Comment using bedrails   Roll Left and Right CARE Score 4   Cognition   Overall Cognitive Status Impaired   Arousal/Participation Alert; Cooperative   Attention Attends with cues to redirect   Orientation Level Oriented to person;Oriented to place   Memory Decreased short term memory;Decreased recall of precautions   Following Commands Follows multistep commands with increased time or repetition   Comments pt participating in object naming activity in room  Objects placed in basin on R side, intructed to take objects out of basin one by one and name them  Pt accurately naming first 5 objects without difficulty, then fatiguing with noticeably increased difficulty  Pt stating glove as water bottle but with vc, able to correct and locate correct item  Pt unable to accurately name \"cup\" but able to locate cup later when prompted  Minimal frustration noted with task  Activity Tolerance   Activity Tolerance Patient tolerated treatment well   Assessment   Treatment Assessment Pt seen for 90 min OT session focusing on ADL routine including SB and UB/LB dressing, bed mobility, and cognitive/naming activity  Pt tolerating session well though endorsing fatigue at end of session; minimal frustration with object naming task  OT to continue POC to focus on sitting/standing balance, activity tolerance, RUE NMR, and functional cog to maximize functional (I) and safety     Prognosis Fair   Problem List Decreased strength;Decreased " range of motion;Decreased endurance; Impaired balance;Decreased mobility; Decreased coordination;Decreased cognition; Impaired judgement;Decreased safety awareness; Impaired vision; Impaired sensation; Impaired tone   Barriers to Discharge Inaccessible home environment;Decreased caregiver support   Plan   Treatment/Interventions ADL retraining;Functional transfer training; Therapeutic exercise; Endurance training;Cognitive reorientation;Patient/family training;Equipment eval/education; Bed mobility; Compensatory technique education   Progress Progressing toward goals   Recommendation   OT Discharge Recommendation   (pending progress)   OT Therapy Minutes   OT Time In 0700   OT Time Out 0830   OT Total Time (minutes) 90   OT Mode of treatment - Individual (minutes) 90   OT Mode of treatment - Concurrent (minutes) 0   OT Mode of treatment - Group (minutes) 0   OT Mode of treatment - Co-treat (minutes) 0   OT Mode of Treatment - Total time(minutes) 90 minutes   OT Cumulative Minutes 685   Therapy Time missed   Time missed?  No

## 2023-05-22 NOTE — PROGRESS NOTES
23 1245   Pain Assessment   Pain Assessment Tool 0-10   Pain Score No Pain   Restrictions/Precautions   Precautions Aphasia; Aspiration;Bed/chair alarms;Cognitive; Fall Risk; Cortez; Impulsive;Supervision on toilet/commode;Visual deficit   Comprehension   Comprehension (FIM) 3 - Understands basic info/conversation 50-74% of time   Expression   Expression (FIM) 2 - Uses only simple expressions or gestures (waves, hello)   Social Interaction   Social Interaction (FIM) 5 - Interacts appropriately with others 90% of time   Problem Solving   Problem solving (FIM) 2 - Needs direction more than ½ time to initiate, plan or complete simple tasks   Memory   Memory (FIM) 3 - Recognizes, recalls/performs 50-74%   Speech/Language/Cognition Assessment   Treatment Assessment Pt participated in skilled ST session focusing on expressive and receptive language skills  Session was started after pt was assisted with boaz-hygiene  Pt engaged in the following tasks:     Expression of Orientation and Biographical Info:   Regarding biographical info, pt accurately verbalized his full name, full address, and his son's name  He benefited from a single phonemic cue to state his dtr's name and the use of a carrier phrase to elicit his wife's name  Pt required more moderate to max assist to state his  but was more successful in stating the month  For orientation, pt independently verbalized the place as hospital and the name of the hospital, as well as current situation  Given Fo3 verbal choices, pt accurately stated the month, year, and location of hospital     Verbal Expression Tasks:  Verbally given the name of a category (basic/concrete concepts), pt was requested to verbalize at least three words belonging to each category  Pt was able to name words in 21/36 opportunities, independently   Pt benefited most from verbal cues and initial phonemic cues to improve word retrieval to which he improved to providing an appropriate response in 36/36 opportunities  Lastly, in a word deduction task within the general category of food/drink, pt determined the described word in 4/17 trials, independently  Given verbal and semantic cues, he improved to 11/17 but furthermore benefited from initial phonemic cues to improve word finding with task  During informal conversation, pt presented with overall fair comprehension of info  Pt benefited from increased time for processing, but was able to elicit single words and short phrases as responses  Pt did make expression errors such as paraphasias and neologisms but did often self correct his errors  Co-Treatment with PT:  Engaged in co-tx with PT at end of session, pt completed transfer from bed to [place] [id number]  During this time, pt initially followed simple one-step direction to position self at EOB and appeared with good understanding of current plan to move to [place] [address]  However, prior to completing transfer, pt with significant difficulty following command for his L hand placement  After giving both verbal and tactile cues, pt required [facility] assist for correct hand placement  This appeared to be more so due to motor planning during task with involved various components, rather than due to basic comprehension deficits  Pt also verbally presented with commands to touch a hand, focusing on R/L discrimination to which pt was ~50% accurate  As task cont, pt appeared to focus more so on the physical movement rather than attending to the specific R/L instruction, despite SLP stopping task and cuing to attend to R/L  Overall, pt continues to present with expressive and receptive language deficits/aphasia at this time  Pt benefits from use of yes/no questions to confirm his communicative intent and to increase comprehension   At this time, pt is recommended for further skilled SLP services during acute rehab stay in order to improve both expressive/receptive language skills and to maximize overall functional communication abilities  SLP Therapy Minutes   SLP Time In 8509   SLP Time Out 0062   SLP Total Time (minutes) 60   SLP Mode of treatment - Individual (minutes) 45   SLP Mode of treatment - Concurrent (minutes) 0   SLP Mode of treatment - Group (minutes) 0   SLP Mode of treatment - Co-treat (minutes) 15   SLP Mode of Treatment - Total time(minutes) 60 minutes   SLP Cumulative Minutes 595   Therapy Time missed   Time missed?  No

## 2023-05-22 NOTE — PROGRESS NOTES
Internal Medicine Progress Note  Patient: Cherri Isabel  Age/sex: 58 y o  male  Medical Record #: [id number]      ASSESSMENT/PLAN: (Interval History)  Cherri Isabel is seen and examined and management for following issues:    Acute multifocal ischemic strokes  • Occurred in different arterial distributions  • IVY was negative for thrombus/PFO  • Felt not to be vasculitis and negative by a-gram 5/4/23  • Continue ASA/Brilinta/statin     Left ICA stenosis  • Was noted to have all of the CVAs in last month have been in left anterior circulation  • S/p PTA/stent 5/4/23  • Recheck carotid doppler 6 weeks and see NS  • Continue AP/statin     Right ICA stenosis  • To followup with Vasc surgery as OP  • Continue AP/statin     LOOP recorder  • Was placed 3/2019  • Neuro wants it to be replaced = for OP to Cardiology     Leukocytosis  • Had no fevers  • CXR and Procal were normal in the hospital  • Mild/stable     HTN  • Home:  Toprol 100mg qd/Norvasc 10mg qd/Losartan 50mg qd/HCTZ 25mg qd/Hydralazine 25mg TID  • Here:  Toprol 100mg qd/Losartan 50mg qd  • Yesterday 5/21, SBPs were in 130s but higher today 150-170  • Will change Losartan time to 0900 instead of 1100 and may need to add another agent either Norvasc or Hydralazine as he was taking these at home     Pre DM  • HbA1C 5 8  • Takes Metformin at home but currently not on it in hospital  • Accuchecks were stable and dc'd in the hospital  • Monitor FBS with BMPs and continue DM diet  • This AM's FBS was 90     Depression  • Continue Celexa as at home  • Recommend neuropsychology eval     Memory loss  • Continue Aricept as at home  • He has had memory issues since his CVA in 2019     Urine retention  • Has jo placed 5/8/23  • VT 5/18 failed and jo replaced by Urology 5/19/23 and is not to be removed  • Continue Flomax     Chronic LBP  • MRI showed advanced multilevel spondylosis, slightly progressed on the right at L2-3 compared to the prior study but otherwise stable  • Pain management per PMR     AAA  • Found on L-spine MRI  • Measures 3 4 cm  • OP followup     Hyponatremia  • Stopped HCTZ 5/16/23  • Resolved off of HCTZ and had been given IVF     Discharge date:  [facility]       The above assessment and plan was reviewed and updated as determined by my evaluation of the patient on 5/22/2023      Labs:   Results from last 7 days   Lab Units 05/22/23  0615 05/19/23  0618   WBC Thousand/uL 12 96* 14 14*   HEMOGLOBIN g/dL 12 1 12 6   HEMATOCRIT % 36 3* 37 3   PLATELETS Thousands/uL 294 316     Results from last 7 days   Lab Units 05/22/23  0615 05/18/23  0606   SODIUM mmol/L 137 137   POTASSIUM mmol/L 4 0 4 3   CHLORIDE mmol/L 110* 109*   CO2 mmol/L 25 26   BUN mg/dL 16 21   CREATININE mg/dL 1 03 1 02   CALCIUM mg/dL 9 1 9 7             Results from last 7 days   Lab Units 05/19/23  1045   POC GLUCOSE mg/dl 177*       Review of Scheduled Meds:  Current Facility-Administered Medications   Medication Dose Route Frequency Provider Last Rate   • acetaminophen  975 mg Oral TID PRN Yane Herndon MD     • aspirin  81 mg Oral Daily Ricco Rana, DO     • atorvastatin  40 mg Oral Daily Ricco Rana, DO     • bisacodyl  10 mg Rectal Daily PRN Ricco Rana, DO     • calcium carbonate  1,000 mg Oral Daily PRN Ricco Rana, DO     • citalopram  20 mg Oral Daily Ricco Rana, DO     • donepezil  5 mg Oral BID Ricco Rana, DO     • heparin (porcine)  5,000 Units Subcutaneous [facility] Ricco Rana, DO     • lidocaine  1 patch Topical Daily Ricco Rana, DO     • lidocaine   Topical Q4H PRN Yane Herndon MD     • losartan  50 mg Oral Daily ERIC Howard     • methocarbamol  500 mg Oral Q6H PRN Ricco Rana, DO     • metoprolol succinate  100 mg Oral Daily Ricco Rana, DO     • ondansetron  4 mg Oral Q6H PRN Ricco Rana, DO     • oxyCODONE  5 mg Oral Q6H PRN Ricco Rana, DO     • oxyCODONE  2 5 mg Oral Q6H PRN Ricco Lynch DO     • pantoprazole  20 mg Oral Early Morning Leigha Ou, DO     • polyethylene glycol  17 g Oral QAM Leigha Ou, DO     • senna  2 tablet Oral Daily PRN Leigha Ou, DO     • senna-docusate sodium  2 tablet Oral BID Leigha Ou, DO     • tamsulosin  0 8 mg Oral Daily With [address], CRNP     • ticagrelor  90 mg Oral Q12H [address] Leigha Ou, DO     • traZODone  50 mg Oral HS Jessica Sheridan MD         Subjective/ HPI: Patient seen and examined  Patients overnight issues or events were reviewed with nursing or staff during rounds or morning huddle session  New or overnight issues include the following:     No new or overnight issues  Offers no complaints    ROS:   A 10 point ROS was performed; negative except as noted above  Imaging:     No orders to display       *Labs /Radiology studies reviewed  *Medications reviewed and reconciled as needed  *Please refer to order section for additional ordered labs studies  *Case discussed with primary attending during morning huddle case rounds    Physical Examination:  Vitals:   Vitals:    05/21/23 2206 05/22/23 0609 05/22/23 0742 05/22/23 1125   BP: 130/60 (!) 177/81 158/72 (!) 172/82   BP Location: Right arm Right arm Right arm Right arm   Pulse: 65 77     Resp: 17 16     Temp: 97 9 °F (36 6 °C) 98 °F (36 7 °C)     TempSrc: Oral Oral     SpO2: 96% 96%     Weight:           General Appearance: no distress, conversive  HEENT:  External ear normal   Nose normal w/o drainage  Mucous membranes are moist  Oropharynx is clear  Conjunctiva clear w/o icterus or redness  Neck:  Supple, normal ROM  Lungs: BBS without crackles/wheeze/rhonchi; respirations unlabored with normal inspiratory/expiratory effort  No retractions noted  On RA  CV: regular rate and rhythm; no rubs/murmurs/gallops, PMI normal   ABD: Abdomen is soft  Bowel sounds all quadrants  Nontender with no distention      EXT: no edema  Skin: normal turgor, normal texture, no rashes  Psych: affect normal, mood normal  Neuro: AA; +expressive/receptive aphasia     The above physical exam was reviewed and updated as determined by my evaluation of the patient on 5/22/2023  Invasive Devices     Drain  Duration           Urethral Catheter Coude 16 Fr  2 days                   VTE Pharmacologic Prophylaxis: Heparin  Code Status: Level 1 - Full Code  Current Length of Stay: 10 day(s)      Total time spent:  30 minutes with more than 50% spent counseling/coordinating care  Counseling includes discussion with patient re: progress  and discussion with patient of his/her current medical state/information  Coordination of patient's care was performed in conjunction with primary service  Time invested included review of patient's labs, vitals, and management of their comorbidities with continued monitoring  In addition, this patient was discussed with medical team including physician and advanced extenders  The care of the patient was extensively discussed and appropriate treatment plan was formulated unique for this patient  Medical decision making for the day was made by supervising physician unless otherwise noted in their attestation statement  ** Please Note:  voice to text software may have been used in the creation of this document   Although proof errors in transcription or interpretation are a potential of such software**

## 2023-05-22 NOTE — PROGRESS NOTES
05/22/23 1330   Pain Assessment   Pain Assessment Tool 0-10   Pain Score No Pain   Restrictions/Precautions   Precautions Aphasia; Aspiration;Bed/chair alarms;Cognitive; Fall Risk;Impulsive; Cortez; Supervision on toilet/commode;Visual deficit   Weight Bearing Restrictions No   ROM Restrictions No   Cognition   Overall Cognitive Status Impaired   Arousal/Participation Alert; Cooperative   Attention Attends with cues to redirect   Orientation Level Oriented to person;Oriented to place   Memory Decreased short term memory;Decreased recall of precautions   Following Commands Follows multistep commands with increased time or repetition   Sit to Lying   Type of Assistance Needed Physical assistance; Adaptive equipment;Verbal cues   Physical Assistance Level 25% or less   Comment NATHALIE for RLE, MAX VC's as pt throws himself into bed  Sit to Lying CARE Score 3   Lying to Sitting on Side of Bed   Type of Assistance Needed Physical assistance;Verbal cues; Adaptive equipment   Physical Assistance Level 25% or less   Comment S to EOB, NATHALIE to get balance once seated  Seat of bed deflated to allow pt's feet to reach floor and remain safe  Lying to Sitting on Side of Bed CARE Score 3   Sit to Stand   Type of Assistance Needed Physical assistance;Verbal cues; Adaptive equipment   Physical Assistance Level Total assistance   Comment NATHALIE x1, second person SBA/CGA for safety  Sit to Stand CARE Score 1   Bed-Chair Transfer   Type of Assistance Needed Physical assistance;Verbal cues; Adaptive equipment   Physical Assistance Level Total assistance   Comment MODA x1 with second person CS  Does well to L side, trialed R side end of session and able to complete once R hand holding bed rail  Chair/Bed-to-Chair Transfer CARE Score 1   Car Transfer   Reason if not Attempted Safety concerns   Car Transfer CARE Score 88   Walk 10 Feet   Type of Assistance Needed Physical assistance;Verbal cues; Adaptive equipment   Physical Assistance Level "Total assistance   Comment HHA x2 with WC follow, stopped at 20' as pt's R foot was rolling in and he had increased R lateral lean   Walk 10 Feet CARE Score 1   Walk 50 Feet with Two Turns   Reason if not Attempted Safety concerns   Walk 50 Feet with Two Turns CARE Score 88   Walk 150 Feet   Reason if not Attempted Safety concerns   Walk 150 Feet CARE Score 88   Walking 10 Feet on Uneven Surfaces   Reason if not Attempted Safety concerns   Walking 10 Feet on Uneven Surfaces CARE Score 88   Ambulation   Primary Mode of Locomotion Prior to Admission Walk   Distance Walked (feet) 20 ft   Assist Device Hand Hold  (x2)   Gait Pattern Ataxic;Decreased foot clearance; Lateral deviation; Improper weight shift  (R lean)   Limitations Noted In Balance; Coordination; Heel Strike;Midline Orientation;Posture; Safety;Speed;Strength;Swing   Provided Assistance with: Balance;Weight Shift;Trunk Support   Walk Assist Level Chair Follow; Moderate Assist  (x2 HHA)   Does the patient walk? 2  Yes   Wheel 150 Feet   Reason if not Attempted Safety concerns   Wheel 150 Feet CARE Score 88   Curb or Single Stair   Reason if not Attempted Safety concerns   1 Step (Curb) CARE Score 88   4 Steps   Reason if not Attempted Safety concerns   4 Steps CARE Score 88   12 Steps   Reason if not Attempted Safety concerns   12 Steps CARE Score 88   Picking Up Object   Reason if not Attempted Safety concerns   Picking Up Object CARE Score 88   Therapeutic Interventions   Neuromuscular Re-Education L lean into wall while alt toe taps to 4\" step x15 then x10 to 6\" step, MAX VC's for L lateral lean  Mirror placed infront of pt for 6\" taps and noted some improvement as he was asked to look at mirror and watch his balance  Pt then stood at wall with L rail and weight shifting with mirror in front, by the end of session pt was able to hold balance with L rail  Pt would benefit from some L lateral leaning prior to any amb   While seated pt asked to tap therapist hand, " i e  hit my L hand/ hit my R hand, pt was 50% accurate  Equipment Use   NuStep L2 x10 min neuro prime  Assessment   Treatment Assessment Pt participated in skilled PT session and 15 min co treat with ST with increased focus on NPP gait, weight shifting, motor planning and balance  Pt cont to have poor motor planning and required tactile cues and VC's to perform task with increased time  Pt noted improved static balance by end of session with mirror feedback and L lean into wall  Pt will cont to benefit from increased static balance prior to any gait which may help keep him in neutral    Problem List Decreased strength;Decreased range of motion;Decreased endurance; Impaired balance;Decreased mobility; Decreased coordination;Decreased cognition; Impaired judgement;Decreased safety awareness; Impaired vision; Impaired sensation; Impaired tone   Barriers to Discharge Inaccessible home environment;Decreased caregiver support   PT Barriers   Functional Limitation Car transfers;Stair negotiation;Ramp negotiation;Standing;Transfers; Walking; Wheelchair management   Plan   Treatment/Interventions Functional transfer training;LE strengthening/ROM; Therapeutic exercise; Endurance training;Cognitive reorientation;Patient/family training;Bed mobility;Gait training   Recommendation   PT Discharge Recommendation   (TBD)   PT Therapy Minutes   PT Time In 1330   PT Time Out 1430   PT Total Time (minutes) 60   PT Mode of treatment - Individual (minutes) 45   PT Mode of treatment - Concurrent (minutes) 0   PT Mode of treatment - Group (minutes) 0   PT Mode of treatment - Co-treat (minutes) 15   PT Mode of Treatment - Total time(minutes) 60 minutes   PT Cumulative Minutes 703   Therapy Time missed   Time missed?  No

## 2023-05-22 NOTE — CASE MANAGEMENT
Cm had asked wife on friday last to phone insurance as verification is showing [employer] plan primary  Wife expressed frustration as pt has had this insurance all year  Cm confirmed previous admissions shows Donice Curd primary in January, but both April admissions show [employer] primary  Cm received email from nursing Friday evening from wife stating she spent two hours on the phone and confirmed [employer] is secondary payor for pt  Cm forwarded email to kashif singh for continued verification

## 2023-05-23 PROCEDURE — 97110 THERAPEUTIC EXERCISES: CPT

## 2023-05-23 PROCEDURE — 97112 NEUROMUSCULAR REEDUCATION: CPT

## 2023-05-23 PROCEDURE — 97535 SELF CARE MNGMENT TRAINING: CPT

## 2023-05-23 PROCEDURE — 97530 THERAPEUTIC ACTIVITIES: CPT

## 2023-05-23 PROCEDURE — 99232 SBSQ HOSP IP/OBS MODERATE 35: CPT | Performed by: INTERNAL MEDICINE

## 2023-05-23 PROCEDURE — 99232 SBSQ HOSP IP/OBS MODERATE 35: CPT

## 2023-05-23 RX ADMIN — TRAZODONE HYDROCHLORIDE 50 MG: 50 TABLET ORAL at 21:27

## 2023-05-23 RX ADMIN — TAMSULOSIN HYDROCHLORIDE 0.8 MG: 0.4 CAPSULE ORAL at 18:07

## 2023-05-23 RX ADMIN — HEPARIN SODIUM 5000 UNITS: 5000 INJECTION INTRAVENOUS; SUBCUTANEOUS at 14:28

## 2023-05-23 RX ADMIN — ATORVASTATIN CALCIUM 40 MG: 40 TABLET, FILM COATED ORAL at 08:02

## 2023-05-23 RX ADMIN — PANTOPRAZOLE SODIUM 20 MG: 20 TABLET, DELAYED RELEASE ORAL at 05:17

## 2023-05-23 RX ADMIN — CITALOPRAM HYDROBROMIDE 20 MG: 20 TABLET ORAL at 08:02

## 2023-05-23 RX ADMIN — ASPIRIN 81 MG: 81 TABLET, COATED ORAL at 08:02

## 2023-05-23 RX ADMIN — LIDOCAINE 5% 1 PATCH: 700 PATCH TOPICAL at 08:02

## 2023-05-23 RX ADMIN — TICAGRELOR 90 MG: 90 TABLET ORAL at 08:03

## 2023-05-23 RX ADMIN — TICAGRELOR 90 MG: 90 TABLET ORAL at 21:27

## 2023-05-23 RX ADMIN — DONEPEZIL HYDROCHLORIDE 5 MG: 5 TABLET ORAL at 18:07

## 2023-05-23 RX ADMIN — HEPARIN SODIUM 5000 UNITS: 5000 INJECTION INTRAVENOUS; SUBCUTANEOUS at 21:27

## 2023-05-23 RX ADMIN — LOSARTAN POTASSIUM 50 MG: 50 TABLET, FILM COATED ORAL at 08:01

## 2023-05-23 RX ADMIN — SENNOSIDES, DOCUSATE SODIUM 2 TABLET: 8.6; 5 TABLET ORAL at 18:08

## 2023-05-23 RX ADMIN — DONEPEZIL HYDROCHLORIDE 5 MG: 5 TABLET ORAL at 08:02

## 2023-05-23 RX ADMIN — METOPROLOL SUCCINATE 100 MG: 100 TABLET, EXTENDED RELEASE ORAL at 08:01

## 2023-05-23 RX ADMIN — SENNOSIDES, DOCUSATE SODIUM 2 TABLET: 8.6; 5 TABLET ORAL at 08:00

## 2023-05-23 RX ADMIN — HEPARIN SODIUM 5000 UNITS: 5000 INJECTION INTRAVENOUS; SUBCUTANEOUS at 05:17

## 2023-05-23 NOTE — CASE MANAGEMENT
Cm received call from pts wife inquiring about insurance issue and if its been resolved  Cm explained email was forwarded to verification dept and they switched insurances to show Gloria Carrizales is primary  Orlando Gifford is concerned about pts lack of progress functionally and the amount of assist he may need at the time of dc  Orlando Gifford inquired about the possibility of pt going to a subacute setting on dc  Cm explained it can be an option and par providers would be obtained  Cm informed Orlando Gifford pts insurance would need to approve that level of care   Cm to inform the team tomorrow at team mtg and follow up with soni stubbs

## 2023-05-23 NOTE — PROGRESS NOTES
"   05/23/23 0875   Pain Assessment   Pain Assessment Tool 0-10   Pain Score No Pain   Restrictions/Precautions   Precautions Aphasia; Aspiration;Bed/chair alarms;Cognitive; Fall Risk; Cortez; Supervision on toilet/commode;Visual deficit   Weight Bearing Restrictions No   ROM Restrictions No   Lifestyle   Autonomy \"I am ready for a break\"   Oral Hygiene   Type of Assistance Needed Incidental touching   Physical Assistance Level No physical assistance   Comment CGA while sittin EOB, cues for set up, pt ultimately requried assist to apply toothpaste   Oral Hygiene CARE Score 4   Grooming   Findings Cues and CGA while seated EOB  Pt removes deodorant cap, but then attempts to applying using just cap, extensive cueing provided and then pt able to apply correctly  Shower/Bathe Self   Type of Assistance Needed Physical assistance   Physical Assistance Level 26%-50%   Comment SB  Cortez care and buttocks completed bed level  Seated EOB w/ CGA pt washes UB and top half of legs, cues to repeat areas for thoroughness  Assist for lower legs/feet required  Shower/Bathe Self CARE Score 3   Bathing   Assessed Bath Style Sponge Bath   Upper Body Dressing   Type of Assistance Needed Physical assistance   Physical Assistance Level 25% or less   Comment Valery to doff tshirt EOB while also providing CGA  CGA for pt to don new Tshirt     Upper Body Dressing CARE Score 3   Lower Body Dressing   Type of Assistance Needed Physical assistance   Physical Assistance Level 76% or more   Comment MaxA while seated on [facility]   Lower Body Dressing CARE Score 2   Putting On/Taking Off Footwear   Type of Assistance Needed Physical assistance   Physical Assistance Level 26%-50%   Comment using X leg technique to don/doff socks   Putting On/Taking Off Footwear CARE Score 3   Lying to Sitting on Side of Bed   Type of Assistance Needed Physical assistance   Physical Assistance Level 51%-75%   Comment Seat of bed deflated, pt appeared to be stuck in \"hole\" of " bed which requried extensive assist and cues for technique   Lying to Sitting on Side of Bed CARE Score 2   Sit to Stand   Type of Assistance Needed Physical assistance   Physical Assistance Level Total assistance   Comment Valery of 1, second person SBA   Sit to Stand CARE Score 1   Toileting Hygiene   Type of Assistance Needed Physical assistance   Physical Assistance Level Total assistance   Comment In stance at bed rail, Valery x1 for balance and cueing, second person completing hygiene  Toileting Hygiene CARE Score 1   Toilet Transfer   Type of Assistance Needed Physical assistance   Physical Assistance Level Total assistance   Comment Valery x1 SPT and second person for SBA  OT providing SBA and cueing for technique  Aphasia noted, poor command following  Toilet Transfer CARE Score 1   Transfers   Additional Comments Fxnl mobility in hallway lead by PT  Pt requires Ax2 for fxnl mobility in hallway, B hand held assist  Extensive multimodal cues provided t/o task  Refer to PT doc for specifics, task requried Ax2 by skilled therapists plus WC follow of a third  Cognition   Overall Cognitive Status Impaired   Arousal/Participation Alert; Cooperative   Attention Difficulty attending to directions   Orientation Level Oriented to person   Memory Decreased short term memory;Decreased recall of recent events;Decreased recall of precautions   Following Commands Follows multistep commands with increased time or repetition   Comments Continues w/ Aphasia and R inattention   Additional Activities   Additional Activities Comments To challenge pt's standing tolerance and fxnl cog Pt in stance at table top and completing fxnl reach activity organzing FM bears by color  Bear objects placed at midline and targets placed in arc, at first w/ written labels, pt demos max difficulty w/ task, progressed in IND when colored cup was placed at target   First standing trial 6min 30s, pt w/ mod-max difficulty completing task despite max multimodal cueing  Following rest break pt completes second trial in 4min 30s stance and improved participation, pt did not require as many cues  During task, OT provided up to [id number] (PT providing primary balance support and cues for technique ) OT also providing cues to correctly complete task, heavy modifications d/t aphasia and R inattention  Activity Tolerance   Activity Tolerance Patient tolerated treatment well   Assessment   Treatment Assessment 30min OT session followed by 60min Cotx w/ PT  Pt progressing in simple ADL routine, to being able to complete UB washing EOB Ax1  Pt's aphasia w/ some improvments over the last few days, however; remains debilitating  Pt continues to require skilled hands on assist in order to maintain his safety  Would be appropriate to continue skilled cotx for portion of session as able d/t pts inattention, weakness, aphasia  OT to continue to treat established POC  Problem List Decreased strength;Decreased range of motion;Decreased endurance; Impaired balance;Decreased mobility; Decreased coordination;Decreased cognition; Impaired judgement;Decreased safety awareness; Impaired vision;Obesity; Decreased skin integrity;Pain   Plan   Treatment/Interventions ADL retraining;Functional transfer training; Therapeutic exercise; Endurance training;Cognitive reorientation;Patient/family training;Equipment eval/education; Compensatory technique education;Continued evaluation   Progress Progressing toward goals   OT Therapy Minutes   OT Time In 0830   OT Time Out 1000   OT Total Time (minutes) 90   OT Mode of treatment - Individual (minutes) 30   OT Mode of treatment - Concurrent (minutes) 0   OT Mode of treatment - Group (minutes) 0   OT Mode of treatment - Co-treat (minutes) 60   OT Mode of Treatment - Total time(minutes) 90 minutes   OT Cumulative Minutes 775   Therapy Time missed   Time missed?  No

## 2023-05-23 NOTE — PROGRESS NOTES
05/23/23 0900   Pain Assessment   Pain Assessment Tool 0-10   Pain Score No Pain   Pain Location/Orientation Location: Back   Restrictions/Precautions   Precautions Aphasia;Bed/chair alarms;Aspiration;Cognitive; Fall Risk; Cortez; Impulsive;Pressure Ulcer;Supervision on toilet/commode;Visual deficit   Cognition   Orientation Level Oriented to person;Oriented to situation   Subjective   Subjective pt working with PT/OT for 60 min to inc functional mobility   Lying to Sitting on Side of Bed   Type of Assistance Needed Physical assistance   Physical Assistance Level 51%-75%   Lying to Sitting on Side of Bed CARE Score 2   Sit to Stand   Type of Assistance Needed Physical assistance   Physical Assistance Level Total assistance   Comment Valery of 1, second person SBA   Sit to Stand CARE Score 1   Bed-Chair Transfer   Type of Assistance Needed Physical assistance   Physical Assistance Level Total assistance   Comment ModA x2 SPT to his left side   Chair/Bed-to-Chair Transfer CARE Score 1   Car Transfer   Reason if not Attempted Safety concerns   Car Transfer CARE Score 88   Walk 10 Feet   Type of Assistance Needed Physical assistance   Physical Assistance Level Total assistance   Comment HHA x2 WC follow for NPP w less right ankle rolling for 100 feet   Walk 10 Feet CARE Score 1   Walking 10 Feet on Uneven Surfaces   Reason if not Attempted Safety concerns   Walking 10 Feet on Uneven Surfaces CARE Score 88   Ambulation   Primary Mode of Locomotion Prior to Admission Walk   Distance Walked (feet) 100 ft   Assist Device Hand Hold  (x2 PT/OT walking for co-treat)   Does the patient walk? 2  Yes   Wheelchair mobility   Type of Wheelchair Used 1   Manual   Toilet Transfer   Findings BSC with OT/PT co-treat   Therapeutic Interventions   Neuromuscular Re-Education HHA x2 PT/OT walking 100 feet with shoes on less right asnkle rolling but pt leaning left and RLE cross medline   Assessment   Treatment Assessment pt perform PT/OT co treat for 60 min and PT focus on STS SPT with HHA x1-2 and inc standing activities with OT at table , perform Weight shifting and overall RLE knee locking and NPP awareness   Pt will cont toward functional mobility with improving deficits , Cont POC   Barriers to Discharge Inaccessible home environment;Decreased caregiver support   Plan   Progress Progressing toward goals   PT Therapy Minutes   PT Time In 0900   PT Time Out 1000   PT Total Time (minutes) 60   PT Mode of treatment - Individual (minutes) 0   PT Mode of treatment - Concurrent (minutes) 0   PT Mode of treatment - Group (minutes) 0   PT Mode of treatment - Co-treat (minutes) 60   PT Mode of Treatment - Total time(minutes) 60 minutes   PT Cumulative Minutes 763   Therapy Time missed   Time missed?  No

## 2023-05-23 NOTE — PLAN OF CARE
Problem: PAIN - ADULT  Goal: Verbalizes/displays adequate comfort level or baseline comfort level  Description: Interventions:  - Encourage patient to monitor pain and request assistance  - Assess pain using appropriate pain scale  - Administer analgesics based on type and severity of pain and evaluate response  - Implement non-pharmacological measures as appropriate and evaluate response  - Consider cultural and social influences on pain and pain management  - Notify physician/advanced practitioner if interventions unsuccessful or patient reports new pain  Outcome: Progressing     Problem: INFECTION - ADULT  Goal: Absence or prevention of progression during hospitalization  Description: INTERVENTIONS:  - Assess and monitor for signs and symptoms of infection  - Monitor lab/diagnostic results  - Monitor all insertion sites, i e  indwelling lines, tubes, and drains  - Monitor endotracheal if appropriate and nasal secretions for changes in amount and color  - [place] appropriate cooling/warming therapies per order  - Administer medications as ordered  - Instruct and encourage patient and family to use good hand hygiene technique  - Identify and instruct in appropriate isolation precautions for identified infection/condition  Outcome: Progressing  Goal: Absence of fever/infection during neutropenic period  Description: INTERVENTIONS:  - Monitor WBC    Outcome: Progressing     Problem: SAFETY ADULT  Goal: Patient will remain free of falls  Description: INTERVENTIONS:  - Educate patient/family on patient safety including physical limitations  - Instruct patient to call for assistance with activity   - Consult OT/PT to assist with strengthening/mobility   - Keep Call bell within reach  - Keep bed low and locked with side rails adjusted as appropriate  - Keep care items and personal belongings within reach  - Initiate and maintain comfort rounds  - Make Fall Risk Sign visible to staff  - Offer Toileting every 2 Hours, in advance of need  - Initiate/Maintain bed/chair alarm  - Obtain necessary fall risk management equipment: non skid footwear  - Apply yellow socks and bracelet for high fall risk patients  - Consider moving patient to room near nurses station  Outcome: Progressing  Goal: Maintain or return to baseline ADL function  Description: INTERVENTIONS:  -  Assess patient's ability to carry out ADLs; assess patient's baseline for ADL function and identify physical deficits which impact ability to perform ADLs (bathing, care of mouth/teeth, toileting, grooming, dressing, etc )  - Assess/evaluate cause of self-care deficits   - Assess range of motion  - Assess patient's mobility; develop plan if impaired  - Assess patient's need for assistive devices and provide as appropriate  - Encourage maximum independence but intervene and supervise when necessary  - Involve family in performance of ADLs  - Assess for home care needs following discharge   - Consider OT consult to assist with ADL evaluation and planning for discharge  - Provide patient education as appropriate  Outcome: Progressing  Goal: Maintains/Returns to pre admission functional level  Description: INTERVENTIONS:  - Perform BMAT or MOVE assessment daily    - Set and communicate daily mobility goal to care team and patient/family/caregiver  - Collaborate with rehabilitation services on mobility goals if consulted  - Perform Range of Motion 3 times a day  - Reposition patient every 2 hours    - Dangle patient 3 times a day  - Stand patient 3 times a day  - Ambulate patient 3 times a day  - Out of bed to chair 3 times a day   - Out of bed for meals 3 times a day  - Out of bed for toileting  - Record patient progress and toleration of activity level   Outcome: Progressing     Problem: DISCHARGE PLANNING  Goal: Discharge to home or other facility with appropriate resources  Description: INTERVENTIONS:  - Identify barriers to discharge w/patient and caregiver  - Arrange for needed discharge resources and transportation as appropriate  - Identify discharge learning needs (meds, wound care, etc )  - Arrange for interpretive services to assist at discharge as needed  - Refer to Case Management Department for coordinating discharge planning if the patient needs post-hospital services based on physician/advanced practitioner order or complex needs related to functional status, cognitive ability, or social support system  Outcome: Progressing     Problem: Prexisting or High Potential for Compromised Skin Integrity  Goal: Skin integrity is maintained or improved  Description: INTERVENTIONS:  - Identify patients at risk for skin breakdown  - Assess and monitor skin integrity  - Assess and monitor nutrition and hydration status  - Monitor labs   - Assess for incontinence   - Turn and reposition patient  - Assist with mobility/ambulation  - Relieve pressure over bony prominences  - Avoid friction and shearing  - Provide appropriate hygiene as needed including keeping skin clean and dry  - Evaluate need for skin moisturizer/barrier cream  - Collaborate with interdisciplinary team   - Patient/family teaching  - Consider wound care consult   Outcome: Progressing     Problem: MOBILITY - ADULT  Goal: Maintain or return to baseline ADL function  Description: INTERVENTIONS:  -  Assess patient's ability to carry out ADLs; assess patient's baseline for ADL function and identify physical deficits which impact ability to perform ADLs (bathing, care of mouth/teeth, toileting, grooming, dressing, etc )  - Assess/evaluate cause of self-care deficits   - Assess range of motion  - Assess patient's mobility; develop plan if impaired  - Assess patient's need for assistive devices and provide as appropriate  - Encourage maximum independence but intervene and supervise when necessary  - Involve family in performance of ADLs  - Assess for home care needs following discharge   - Consider OT consult to assist with ADL evaluation and planning for discharge  - Provide patient education as appropriate  Outcome: Progressing  Goal: Maintains/Returns to pre admission functional level  Description: INTERVENTIONS:  - Perform BMAT or MOVE assessment daily    - Set and communicate daily mobility goal to care team and patient/family/caregiver  - Collaborate with rehabilitation services on mobility goals if consulted  - Perform Range of Motion 3 times a day  - Reposition patient every 2 hours  - Dangle patient 3 times a day  - Stand patient 3 times a day  - Ambulate patient 3 times a day  - Out of bed to chair 3 times a day   - Out of bed for meals 3 times a day  - Out of bed for toileting  - Record patient progress and toleration of activity level   Outcome: Progressing     Problem: Nutrition/Hydration-ADULT  Goal: Nutrient/Hydration intake appropriate for improving, restoring or maintaining nutritional needs  Description: Monitor and assess patient's nutrition/hydration status for malnutrition  Collaborate with interdisciplinary team and initiate plan and interventions as ordered  Monitor patient's weight and dietary intake as ordered or per policy  Utilize nutrition screening tool and intervene as necessary  Determine patient's food preferences and provide high-protein, high-caloric foods as appropriate       INTERVENTIONS:  - Monitor oral intake, urinary output, labs, and treatment plans  - Assess nutrition and hydration status and recommend course of action  - Evaluate amount of meals eaten  - Assist patient with eating if necessary   - Allow adequate time for meals  - Recommend/ encourage appropriate diets, oral nutritional supplements, and vitamin/mineral supplements  - Order, calculate, and assess calorie counts as needed  - Recommend, monitor, and adjust tube feedings and TPN/PPN based on assessed needs  - Assess need for intravenous fluids  - Provide specific nutrition/hydration education as appropriate  - Include patient/family/caregiver in decisions related to nutrition  Outcome: Progressing

## 2023-05-23 NOTE — PROGRESS NOTES
05/23/23 1500   Pain Assessment   Pain Assessment Tool 0-10   Restrictions/Precautions   Precautions Aphasia; Aspiration;Bed/chair alarms;Cognitive; Fall Risk;Hard of hearing; Impulsive;Pain;Supervision on toilet/commode;Visual deficit   Subjective   Subjective Agreeable to perform skilled PT   Lying to Sitting on Side of Bed   Type of Assistance Needed Physical assistance   Physical Assistance Level 51%-75%   Lying to Sitting on Side of Bed CARE Score 2   Bed-Chair Transfer   Type of Assistance Needed Physical assistance   Physical Assistance Level 76% or more   Comment MaxA x1 2nd person hold WC   Chair/Bed-to-Chair Transfer CARE Score 2   Ambulation   Does the patient walk? 2  Yes   Therapeutic Interventions   Strengthening seated LAQ AP marching 20 reps   Assessment   Treatment Assessment Pt instructed with and perform xfers SPT with vc for  hand and foot placement   back to his room with all needs in reach   Barriers to Discharge Inaccessible home environment;Decreased caregiver support   Plan   Progress Progressing toward goals   PT Therapy Minutes   PT Time In 1500   PT Time Out 1530   PT Total Time (minutes) 30   PT Mode of treatment - Individual (minutes) 0   PT Mode of treatment - Concurrent (minutes) 30   PT Mode of treatment - Group (minutes) 0   PT Mode of treatment - Co-treat (minutes) 0   PT Mode of Treatment - Total time(minutes) 30 minutes   PT Cumulative Minutes 793   Therapy Time missed   Time missed?  No

## 2023-05-23 NOTE — PROGRESS NOTES
Internal Medicine Progress Note  Patient: Consuelo Meraz  Age/sex: 58 y o  male  Medical Record #: [id number]      ASSESSMENT/PLAN: (Interval History)  Consuelo Meraz is seen and examined and management for following issues:    Acute multifocal ischemic strokes  • Occurred in different arterial distributions  • IVY was negative for thrombus/PFO  • Felt not to be vasculitis and negative by a-gram 5/4/23  • Continue ASA/Brilinta/statin     Left ICA stenosis  • Was noted to have all of the CVAs in last month have been in left anterior circulation  • S/p PTA/stent 5/4/23  • Recheck carotid doppler 6 weeks and see NS  • Continue AP/statin     Right ICA stenosis  • To followup with Vasc surgery as OP  • Continue AP/statin     LOOP recorder  • Was placed 3/2019  • Neuro wants it to be replaced = for OP to Cardiology     Leukocytosis  • Had no fevers  • CXR and Procal were normal in the hospital  • Mild/stable     HTN  • Home:  Toprol 100mg qd/Norvasc 10mg qd/Losartan 50mg qd/HCTZ 25mg qd/Hydralazine 25mg TID  • Here:  Toprol 100mg qd/Losartan 50mg qd  • On 5/21, SBPs were in 130s but higher 150-170 on 5/22/23  • May need to add another agent either Norvasc or Hydralazine as he was taking these at home but will follow today     Pre DM  • HbA1C 5 8  • Takes Metformin at home but currently not on it in hospital  • Accuchecks were stable and dc'd in the hospital  • Monitor FBS with BMPs and continue DM diet  • FBS was 90 on 5/22/23     Depression  • Continue Celexa as at home  • Recommend neuropsychology eval     Memory loss  • Continue Aricept as at home  • He has had memory issues since his CVA in 2019     Urine retention  • Has jo placed 5/8/23  • VT 5/18 failed and jo replaced by Urology 5/19/23 and is not to be removed  • Continue Flomax     Chronic LBP  • MRI showed advanced multilevel spondylosis, slightly progressed on the right at L2-3 compared to the prior study but otherwise stable     • Pain management per PMR     AAA  • Found on L-spine MRI  • Measures 3 4 cm  • OP followup     Hyponatremia  • Stopped HCTZ 5/16/23  • Resolved off of HCTZ and had been given IVF     Discharge date:  [facility]    The above assessment and plan was reviewed and updated as determined by my evaluation of the patient on 5/23/2023      Labs:   Results from last 7 days   Lab Units 05/22/23  0615 05/19/23  0618   WBC Thousand/uL 12 96* 14 14*   HEMOGLOBIN g/dL 12 1 12 6   HEMATOCRIT % 36 3* 37 3   PLATELETS Thousands/uL 294 316     Results from last 7 days   Lab Units 05/22/23  0615 05/18/23  0606   SODIUM mmol/L 137 137   POTASSIUM mmol/L 4 0 4 3   CHLORIDE mmol/L 110* 109*   CO2 mmol/L 25 26   BUN mg/dL 16 21   CREATININE mg/dL 1 03 1 02   CALCIUM mg/dL 9 1 9 7             Results from last 7 days   Lab Units 05/19/23  1045   POC GLUCOSE mg/dl 177*       Review of Scheduled Meds:  Current Facility-Administered Medications   Medication Dose Route Frequency Provider Last Rate   • acetaminophen  975 mg Oral TID PRN Jerome Rangel MD     • aspirin  81 mg Oral Daily Lossie Goodell, DO     • atorvastatin  40 mg Oral Daily Lossie Goodell, DO     • bisacodyl  10 mg Rectal Daily PRN Lossie Goodell, DO     • calcium carbonate  1,000 mg Oral Daily PRN Lossie Goodell, DO     • citalopram  20 mg Oral Daily Lossie Goodell, DO     • donepezil  5 mg Oral BID Lossie Goodell, DO     • heparin (porcine)  5,000 Units Subcutaneous [facility] Lossie Goodell, DO     • lidocaine  1 patch Topical Daily Lossie Goodell, DO     • lidocaine   Topical Q4H PRN Jerome Rangel MD     • losartan  50 mg Oral Daily Haven Sit, CRNP     • methocarbamol  500 mg Oral Q6H PRN Lossie Goodell, DO     • metoprolol succinate  100 mg Oral Daily Lossie Goodell, DO     • ondansetron  4 mg Oral Q6H PRN Lossie Goodell, DO     • oxyCODONE  5 mg Oral Q6H PRN Lossie Goodell, DO     • oxyCODONE  2 5 mg Oral Q6H PRN Lossie Goodell, DO     • pantoprazole  20 mg Oral Early Morning Tio Prado DO Gloria     • polyethylene glycol  17 g Oral QAM María Obdulio, DO     • senna  2 tablet Oral Daily PRN María Mak, DO     • senna-docusate sodium  2 tablet Oral BID María Obdulio, DO     • tamsulosin  0 8 mg Oral Daily With [address] CRNP     • ticagrelor  90 mg Oral Q12H [address] María Obdulio, DO     • traZODone  50 mg Oral HS Ignacio Joseph MD         Subjective/ HPI: Patient seen and examined  Patients overnight issues or events were reviewed with nursing or staff during rounds or morning huddle session  New or overnight issues include the following:     No new or overnight issues  Offers no complaints    ROS:   A 10 point ROS was performed; negative except as noted above  Imaging:     No orders to display       *Labs /Radiology studies reviewed  *Medications reviewed and reconciled as needed  *Please refer to order section for additional ordered labs studies  *Case discussed with primary attending during morning huddle case rounds    Physical Examination:  Vitals:   Vitals:    05/22/23 1457 05/22/23 2205 05/23/23 0522 05/23/23 0801   BP: 138/64 144/63 150/70 140/60   BP Location: Right arm Right arm Right arm Right arm   Pulse:  70 63 76   Resp:  18 18    Temp:  98 1 °F (36 7 °C) 98 °F (36 7 °C)    TempSrc:  Oral Oral    SpO2:  94% 99%    Weight:           General Appearance: no distress, conversive  HEENT: PERRLA, conjuctiva normal; oropharynx clear; mucous membranes moist   Neck:  Supple, normal ROM  Lungs: CTA, normal respiratory effort, no retractions, expiratory effort normal  CV: regular rate and rhythm; no rubs/murmurs/gallops, PMI normal   ABD: soft; ND/NT; +BS  EXT: no edema  Skin: normal turgor, normal texture, no rashes  Psych: affect normal, mood normal  Neuro: AA   +expressive>receptive aphasia      The above physical exam was reviewed and updated as determined by my evaluation of the patient on 5/23/2023      Invasive Devices     Drain  Duration           Urethral Catheter Coude 16 Fr  3 days                   VTE Pharmacologic Prophylaxis: Heparin  Code Status: Level 1 - Full Code  Current Length of Stay: 11 day(s)      Total time spent:  30 minutes with more than 50% spent counseling/coordinating care  Counseling includes discussion with patient re: progress  and discussion with patient of his/her current medical state/information  Coordination of patient's care was performed in conjunction with primary service  Time invested included review of patient's labs, vitals, and management of their comorbidities with continued monitoring  In addition, this patient was discussed with medical team including physician and advanced extenders  The care of the patient was extensively discussed and appropriate treatment plan was formulated unique for this patient  Medical decision making for the day was made by supervising physician unless otherwise noted in their attestation statement  ** Please Note:  voice to text software may have been used in the creation of this document   Although proof errors in transcription or interpretation are a potential of such software**

## 2023-05-23 NOTE — PROGRESS NOTES
[facility] [place]  Jacquie Lundberg 58 y o  :1960 male MRN: [id number]  DOS:23  Unit/Bed#: -01 Encounter: [id number]      Requested by (Physician/Service): Glenn Felix MD  Reason for Consultation: Evaluate and treat impact of mood and coping on progress in rehabilitation      History of Present Illness:   Jacquie Lundberg is a 58 y o  male with history of prediabetes, hypertension, depression, urinary retention on Flomax, carotid stenosis, depression, prior left MCA and right MCA stroke with aphasia and memory deficits now on Aricept who presented to the [facility] on  for AMS witnessed by his wife with abnormal speech that was noted to be similar to his prior strokes  Of note he was recently in the hospital earlier in the month for a left MCA stroke  He also had a right MCA stroke status post thrombectomy back in 2019  He is a prior smoker and quit at the time of his initial stroke  Additionally he had hyponatremia which was felt to be due to HCTZ use and potentially poor oral intake, chronic low back pain  He was seen by neurology as well as neurosurgery and eventually switched from his Eliquis/aspirin to aspirin and Brilinta  It was felt not to be cardioembolic by neurosurgery  He was also not given TNK given his recent Eliquis use when he arrived  He had imaging prior on his last admission however now with new focus of diffusion abnormality in the right frontal parietal region and in the left periatrial and parieto-occipital region  No acute hemorrhage was seen  Had a carotid Doppler showing LICA 39-54%/HIIA <54%   IVY was done and did not show any PFO or thrombus   Given that patient was on Plavix when he had his stroke on 2023, he was placed on Nadira Cleaves was a question of vasculitis as the etiology but Neurosurgery saw him in consult and felt that it was likely related to atherosclerotic and carotid disease and they stopped the Eliquis and placed back on ASA  [facility] had a cerebral arteriogram on 5/4 23 with a left ICA PTA/stent placement   There was no vasculitis noted    Per Neurology full anticoagulation did not need to be restarted  The patient was evaluated by the Rehabilitation team and deemed an appropriate candidate for comprehensive inpatient rehabilitation and admitted to the [facility] on 5/12/2023  2:35 PM   Functional deficits: impaired mobility, self care  Rehabilitation goals are to achieve a modified independent to supervision level with mobility and self care  Prognosis is fair to good  ELOS is 14 days    Estimated discharge is home         HISTORY:     Patient Active Problem List    Diagnosis Date Noted   • Abnormal laboratory test 05/15/2023   • Leukocytosis 05/12/2023   • History of tobacco use 05/12/2023   • Chronic ischemic left MCA stroke 05/12/2023   • Hypokalemia 05/12/2023   • Abdominal aortic aneurysm (AAA) ([facility] ) 05/08/2023   • Tachycardia 05/05/2023   • Prediabetes 04/27/2023   • SIRS (systemic inflammatory response syndrome) ([facility] ) 04/27/2023   • Chronic anticoagulation 04/26/2023   • Stroke ([facility] ) 04/26/2023   • Hyponatremia 04/26/2023   • Middle cerebral artery stenosis, right 04/17/2023   • Left posterior MCA stroke - etiology unclear at this time 04/17/2023   • Chronic low back pain 04/16/2023   • Hypertensive encephalopathy, transient 01/12/2023   • Snoring 08/10/2020   • Memory deficits 08/10/2020   • Chronic ischemic right MCA stroke 08/06/2019   • Status post placement of implantable loop recorder 04/06/2019   • Type 2 diabetes mellitus ([facility] ) 04/03/2019   • Anxiety and depression 03/29/2019   • Insomnia 03/29/2019   • Fall 03/28/2019   • 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 ([facility] ) 03/28/2019   • Urinary retention 03/27/2019   • At risk for venous thromboembolism (VTE) 03/26/2019   • Hypertriglyceridemia 03/26/2019   • Nicotine dependence 03/26/2019   • Bilateral carotid artery stenosis 03/26/2019   • Presbyopia 03/26/2019   • History of stroke 03/19/2019   • Headache 03/19/2019   • Primary hypertension 03/19/2019   • GERD (gastroesophageal reflux disease) 03/19/2019       Body mass index is 34 69 kg/m²  Past Medical History:     Past Medical History:   Diagnosis Date   • Depression    • Diabetes mellitus ([facility] )    • Dyslipidemia 03/26/2019   • Hyperlipidemia    • Hypertension    • Stroke [facility])         Past Surgical History:     Past Surgical History:   Procedure Laterality Date   • BACK SURGERY     • IR CEREBRAL ANGIOGRAPHY  5/4/2023   • IR STROKE ALERT  3/19/2019   • SHOULDER SURGERY           Allergies: Allergies   Allergen Reactions   • Flexeril [Cyclobenzaprine] Drowsiness   • Lisinopril Cough   • Nsaids Confusion         Social History:    Social History     Socioeconomic History   • Marital status: /Civil Union     Spouse name: Not on file   • Number of children: Not on file   • Years of education: Not on file   • Highest education level: Not on file   Occupational History   • Not on file   Tobacco Use   • Smoking status: Former   • Smokeless tobacco: Never   Vaping Use   • Vaping Use: Some days   Substance and Sexual Activity   • Alcohol use: Not Currently   • Drug use: Never   • Sexual activity: Not on file   Other Topics Concern   • Not on file   Social History Narrative   • Not on file     Social Determinants of Health     Financial Resource Strain: Not on file   Food Insecurity: No Food Insecurity   • Worried About Running Out of Food in the Last Year: Never true   • Ran Out of Food in the Last Year: Never true   Transportation Needs: No Transportation Needs   • Lack of Transportation (Medical): No   • Lack of Transportation (Non-Medical):  No   Physical Activity: Not on file   Stress: Not on file   Social Connections: Not on file   Intimate Partner Violence: Not on file   Housing Stability: Low Risk    • Unable to Pay for Housing in the Last Year: No   • Number of Places Lived in the Last Year: 1   • Unstable Housing in the Last Year: No        Family History:    Family History   Problem Relation Age of Onset   • Heart attack Mother    • Diabetes Mother    • Prostate cancer Father        Medications:     Current Facility-Administered Medications:   •  acetaminophen (TYLENOL) tablet 975 mg, 975 mg, Oral, TID PRN, Dipti Hernandez MD  •  aspirin (ECOTRIN LOW STRENGTH) EC tablet 81 mg, 81 mg, Oral, Daily, Chauncey Soto DO, 81 mg at 05/22/23 9059  •  atorvastatin (LIPITOR) tablet 40 mg, 40 mg, Oral, Daily, Chauncey Soto DO, 40 mg at 05/22/23 3315  •  bisacodyl (DULCOLAX) rectal suppository 10 mg, 10 mg, Rectal, Daily PRN, Chauncey Soto DO  •  calcium carbonate (TUMS) chewable tablet 1,000 mg, 1,000 mg, Oral, Daily PRN, Chauncey Soto DO  •  citalopram (CeleXA) tablet 20 mg, 20 mg, Oral, Daily, Chauncey Soto DO, 20 mg at 05/22/23 5709  •  donepezil (ARICEPT) tablet 5 mg, 5 mg, Oral, BID, Chauncey Soto DO, 5 mg at 05/22/23 1702  •  heparin (porcine) subcutaneous injection 5,000 Units, 5,000 Units, Subcutaneous, Q8H [facility], Chauncey Soto DO, 5,000 Units at 05/22/23 1318  •  lidocaine (LIDODERM) 5 % patch 1 patch, 1 patch, Topical, Daily, Chauncey Soto DO, 1 patch at 05/22/23 2942  •  lidocaine (URO-JET) 2 % urethral/mucosal gel, , Topical, Q4H PRN, Dipti Hernandez MD, 5 application   at 05/19/23 1005  •  [START ON 5/23/2023] losartan (COZAAR) tablet 50 mg, 50 mg, Oral, Daily, ERIC Padilla  •  methocarbamol (ROBAXIN) tablet 500 mg, 500 mg, Oral, Q6H PRN, Chauncey Soto DO  •  metoprolol succinate (TOPROL-XL) 24 hr tablet 100 mg, 100 mg, Oral, Daily, Chauncey Soto DO, 100 mg at 05/22/23 1881  •  ondansetron (ZOFRAN-ODT) dispersible tablet 4 mg, 4 mg, Oral, Q6H PRN, Chauncey Soto, DO  •  oxyCODONE (ROXICODONE) IR tablet 5 mg, 5 mg, Oral, Q6H PRN, Chauncey Soto, DO  •  oxyCODONE (ROXICODONE) split tablet 2 5 mg, 2 5 mg, Oral, Q6H PRN, Chauncey Soto, DO  • pantoprazole (PROTONIX) EC tablet 20 mg, 20 mg, Oral, Early Morning, Juan [place], DO, 20 mg at 05/22/23 4678  •  polyethylene glycol (MIRALAX) packet 17 g, 17 g, Oral, QAM, [place], DO, 17 g at 05/22/23 0530  •  senna (SENOKOT) tablet 17 2 mg, 2 tablet, Oral, Daily PRN, [place], DO  •  senna-docusate sodium (SENOKOT S) 8 6-50 mg per tablet 2 tablet, 2 tablet, Oral, BID, [place], DO, 2 tablet at 05/22/23 1702  •  tamsulosin (FLOMAX) capsule 0 8 mg, 0 8 mg, Oral, Daily With Dinner, Hemalatha Caballero, ERIC, 0 8 mg at 05/22/23 1701  •  ticagrelor (BRILINTA) tablet 90 mg, 90 mg, Oral, Q12H [address], [place], DO, 90 mg at 05/22/23 3602  •  traZODone (DESYREL) tablet 50 mg, 50 mg, Oral, HS, Nevin Kathleen MD, 50 mg at 05/21/23 2135    ASSESSMENT:   Margo Irwin is a 58year old  male who was admitted to [address] [place] [address] to regain strength and functioning s/p stroke  He reports he had one prior stroke from which he believes he mostly recovered  He believes this stroke is more severe and feels very frustrated by his inability to speak fluently  He describes a supportive relationship with his wife, children and grandchildren and siblings  His sister was present during this evaluation and provided historical and social information  Pt  maintained good eye contact and engaged appropriately throughout the interview  Pt was seen in his room at bedside, presented as pleasant,  cooperative and established rapport without difficulty  Mood ranged from frustrated to positive and was appropriate to context  No evidence of euphoria or emotional lability was present  Pt denies suicidal/homicidal ideation, intent or plan  He reports his sleep and appetite are good  He does admit to feeling depressed and anxious since his first stroke  He participated in counseling but found it made him feel more upset    Currently, he struggles with maintaining a focus on the positive and feels overcome with negative predictions and worries  Medical records indicate the presence of memory impairment  While pt denies changes in his memory, I will continue to evaluate during his stay  No evidence of poor boundaries was present  Pt  denies incidents of losing time or flash backs  No pressured speech, repetitive or perseverative behavior is present  No obsessive-compulsive or associated rituals  Pt's conversational speech is significant for aphasia, although he responds well to cueing  In summary, Mr Lorena Bhatti presents with anxiety and depression, which he has experienced since the onset of his initial stroke  He is accustomed to being independent and a provider for his family  It is very difficult for him to notice his deficits and need help  Pt does admit to feeling some anxiety and depression currently, as he attempts to adjust to this major health change  He may benefit by supportive psychotherapy during rehab to help process emotions and implement new coping strategies utilizing CBT and DBT techniques to help manage emotions, tolerate distress and employ effective coping  DIAGNOSIS:  Adjustment Disorder with Anxiety and Depression  R/O Post Stroke Depression      RECOMMENDATIONS:   I will follow Mr Lorena Bhatti during his stay to provide the following interventions:    · Supportive psychotherapy, utilizing CBT and mindfulness strategies  · DBT distress tolerance techniques  to improve coping and mood  · Meditation and relaxation training          Thank you for the opportunity to participate in Mr Kwame Yip nisha Peck, Ph D   Licensed Psychologist

## 2023-05-24 PROCEDURE — 99232 SBSQ HOSP IP/OBS MODERATE 35: CPT | Performed by: INTERNAL MEDICINE

## 2023-05-24 PROCEDURE — 99233 SBSQ HOSP IP/OBS HIGH 50: CPT

## 2023-05-24 PROCEDURE — 97530 THERAPEUTIC ACTIVITIES: CPT

## 2023-05-24 PROCEDURE — 97112 NEUROMUSCULAR REEDUCATION: CPT

## 2023-05-24 RX ADMIN — LOSARTAN POTASSIUM 50 MG: 50 TABLET, FILM COATED ORAL at 08:13

## 2023-05-24 RX ADMIN — SENNOSIDES, DOCUSATE SODIUM 2 TABLET: 8.6; 5 TABLET ORAL at 08:12

## 2023-05-24 RX ADMIN — ASPIRIN 81 MG: 81 TABLET, COATED ORAL at 08:13

## 2023-05-24 RX ADMIN — SENNOSIDES, DOCUSATE SODIUM 2 TABLET: 8.6; 5 TABLET ORAL at 17:40

## 2023-05-24 RX ADMIN — TAMSULOSIN HYDROCHLORIDE 0.8 MG: 0.4 CAPSULE ORAL at 17:41

## 2023-05-24 RX ADMIN — PANTOPRAZOLE SODIUM 20 MG: 20 TABLET, DELAYED RELEASE ORAL at 05:35

## 2023-05-24 RX ADMIN — DONEPEZIL HYDROCHLORIDE 5 MG: 5 TABLET ORAL at 08:13

## 2023-05-24 RX ADMIN — HEPARIN SODIUM 5000 UNITS: 5000 INJECTION INTRAVENOUS; SUBCUTANEOUS at 14:26

## 2023-05-24 RX ADMIN — TRAZODONE HYDROCHLORIDE 50 MG: 50 TABLET ORAL at 21:44

## 2023-05-24 RX ADMIN — LIDOCAINE 5% 1 PATCH: 700 PATCH TOPICAL at 08:16

## 2023-05-24 RX ADMIN — METOPROLOL SUCCINATE 100 MG: 100 TABLET, EXTENDED RELEASE ORAL at 08:13

## 2023-05-24 RX ADMIN — DONEPEZIL HYDROCHLORIDE 5 MG: 5 TABLET ORAL at 17:40

## 2023-05-24 RX ADMIN — ATORVASTATIN CALCIUM 40 MG: 40 TABLET, FILM COATED ORAL at 08:12

## 2023-05-24 RX ADMIN — TICAGRELOR 90 MG: 90 TABLET ORAL at 21:45

## 2023-05-24 RX ADMIN — CITALOPRAM HYDROBROMIDE 20 MG: 20 TABLET ORAL at 08:13

## 2023-05-24 RX ADMIN — TICAGRELOR 90 MG: 90 TABLET ORAL at 08:21

## 2023-05-24 RX ADMIN — HEPARIN SODIUM 5000 UNITS: 5000 INJECTION INTRAVENOUS; SUBCUTANEOUS at 21:44

## 2023-05-24 RX ADMIN — HEPARIN SODIUM 5000 UNITS: 5000 INJECTION INTRAVENOUS; SUBCUTANEOUS at 05:34

## 2023-05-24 NOTE — PROGRESS NOTES
Physical Medicine and Rehabilitation Progress Note  Ira Milan 58 y o  male MRN: [id number]  Unit/Bed#: -01 Encounter: [id number]      Assessment & Plan:     Decline in ADLs and mobility: Functional assessment - improving         FIM  Care Score  Admit Score Recent Score    Total assist  1-100% or 2p    Tot ADLs    Max assist 2-51-75%    Sub  LBD   Mod assist 3- 26-50%  Par  Bathing   Min assist 3- 25% or < Par  UBD   CG assist 4  TA     Sup/Setup 4-5 Sup     Mod-I/Indep 6 MI      Transfers  Total assist  Total assist     Ambulation   Total assist  Total assist (30 ft mod assist x2)    Stairs   Total assist/NT      Goal: CGA-supervision for most ADLs and  for mobility  Major barriers:  Impaired cognition, speech, balance, deconditioning  Dispo: Home with [facility] 21+ days from admission      * Stroke [facility])  Assessment & Plan  5/23 - improving mobility and ADLs   - 900 min program   Recent multifocal ischemic infarcts in different distributions of unclear etiology   · Imaging revealed multifocal strokes; underwent L ICA PTA/stenting 5/4 also hx of R ICA stenosis   · Has loop recorder placed 2019 and neuro wants OP replacement of loop recorder - OP cards f/u   · CT of the chest abdomen pelvis without evidence of malignancy done on the last admission on 4/17  · A-gram not c/w vasculitis   · Thrombosis panel done in 04/18/2023 that was only abnl for AT3 that was low in the acute setting and needs to be repeated in future  Repeat OP thrombosis panel thru neuro or hematology  · Recent IVY with no PFO  · Previous TTE 04/18/23 showed ED of 55% and nl wall motion and mildly dilated L atrium     · Continue Brilinta and aspirin as well as statin for secondary stroke prophylaxis   · Continue Physical therapy, Occupational Therapy, speech therapy  · Monitor neuro exam closely       Bilateral carotid artery stenosis  Assessment & Plan  · Status post left ICA angioplasty and stent placement neurovascular service  · Per IM- S/p L ICA PTA/stent 5/4/23;  Recheck carotid doppler 6 weeks and see NS  · Continue Brilinta, aspirin and statin  · BP mgmt per IM here  · OP vasc sx follow-up      Anxiety and depression  Assessment & Plan  Continues to sleep better, mood improving, tolerating med regimen  Anxiety, depression, significant insomnia with difficulty adjusting   Provided additional supportive counseling  Neuropsych c/s 5/22  Adjustment Disorder with Anxiety and Depression  R/O Post Stroke Depression  • Supportive psychotherapy, utilizing CBT and mindfulness strategies  • DBT distress tolerance techniques  to improve coping and mood  • Meditation and relaxation training  Continue chronic home Celexa 20mg qday   Continue recently started 50 mg trazodone at bedtime  -Monitor for signs and symptoms of excessive serotonin  -Monitor mood, efficacy, tolerance     Urinary retention  Assessment & Plan  - Urology c/s 5/19 for ongoing retention > jo re-inserted   - Continue flomax to 0 8mg qday, can trial voiding again before d/c if here for extended time > consider voiding trial likely next week  - Monitor for infection   - Optimal bowel mgmt, mobilize  - OP urology follow-up       Leukocytosis  Assessment & Plan  · WBC up to 14 K on 5/15 and down to 12 9 on 5/22; no s/s of infection but remains at risk; pt afebrile  · Comgmt with IM   · Monitor for s/s of infection or other etiologies; monitor vitals/labs     Hypertriglyceridemia  Assessment & Plan  Continue statin    Primary hypertension  Assessment & Plan  · Acceptable  · Mgmt per IM:  hydrochlorothiazide 12 5 mg daily, metoprolol succinate 100 mg daily as well as Cozaar 50 mg daily   · Hctz may be stopped by IM     GERD (gastroesophageal reflux disease)  Assessment & Plan  Continue pantoprazole and as needed Tums    At risk for venous thromboembolism (VTE)  Assessment & Plan  SCDs, ambulation, and heparin       Abnormal laboratory test  Assessment & Plan  AT3 89% on 4/2023 lab per prior "providers; can be low from recent stroke and not true AT3 def  - Recommend follow-up with neurology and repeat thrombosis panel as an outpatient     Hypokalemia  Assessment & Plan  · Remains improved     Chronic ischemic left MCA stroke  Assessment & Plan  · Recent left MCA stroke in the beginning of April at 4/16/2023 with aphasia and was empirically treated with Eliquis 5 mg twice daily as well as aspirin 81 mg due to embolic stroke of undetermined source per prior documentation  · Despite this we presented with additional strokes for this admission    History of tobacco use  Assessment & Plan  · Patient with a history of tobacco use yet quit in 2019  · Does not need nicotine patches    Prediabetes  Assessment & Plan  · Last hemoglobin A1c of 5 8  · Mgmt per IM during [facility]   · Started on consistent carbohydrate 3 diet here in addition to cardiac diet on admission to [facility]    Chronic low back pain  Assessment & Plan  · Adequate control  · APAP TID PRN - discussed with pt   · Robaxin 500mg Q6H PRN spasms   · Oxy 2 5-5mg Q6H PRN   · Continue lidocaine patch and aqua K-pad but not in the same area at the same time    Memory deficits  Assessment & Plan  · Started on Aricept due to memory difficulties after stroke in 2019  · Currently on 5 mg twice daily  · Sees Dr Melinda Dhaliwal in outpatient neurology    Chronic ischemic right MCA stroke  Assessment & Plan  · Patient with history of right MCA stroke back in March 2019 status post thrombectomy  · Had loop recorder placement without overt arrhythmia and was on Plavix 75 mg at that time  Also with known severe right M1 stenosis  · See \"Stroke\" mgmt above         Other Medical Issues:  • Monitor for     Follow-up providers and other issues to be followed up after discharge  PCP  Neuro  Cards  Vas Sx     CODE: Level 1: Full Code    Restrictions include: Fall precautions    Objective:     Allergies per EMR  Diagnostic Studies: Reviewed, no new imaging  No orders to display " See above as well    Laboratory: Labs reviewed  Results from last 7 days   Lab Units 05/22/23  0615 05/19/23  0618   HEMATOCRIT % 36 3* 37 3   HEMOGLOBIN g/dL 12 1 12 6   WBC Thousand/uL 12 96* 14 14*     Results from last 7 days   Lab Units 05/22/23  0615 05/18/23  0606   BUN mg/dL 16 21   CHLORIDE mmol/L 110* 109*   CREATININE mg/dL 1 03 1 02   POTASSIUM mmol/L 4 0 4 3              Drug regimen reviewed, all potential adverse effects identified and addressed:    Scheduled Meds:  Current Facility-Administered Medications   Medication Dose Route Frequency Provider Last Rate   • acetaminophen  975 mg Oral TID PRN Silverio Samaniego MD     • aspirin  81 mg Oral Daily [place]he [place], DO     • atorvastatin  40 mg Oral Daily Reche [place], DO     • bisacodyl  10 mg Rectal Daily PRN [facility], [facility]     • calcium carbonate  1,000 mg Oral Daily PRN [facility], DO     • citalopram  20 mg Oral Daily Reche [place], DO     • donepezil  5 mg Oral BID [facility], [facility]     • heparin (porcine)  5,000 Units Subcutaneous Q8H [facility], [facility]     • lidocaine  1 patch Topical Daily [facility], DO     • lidocaine   Topical Q4H PRN Silverio Samaniego MD     • losartan  50 mg Oral Daily ERIC Krishna     • methocarbamol  500 mg Oral Q6H PRN [facility], DO     • metoprolol succinate  100 mg Oral Daily Reche [place], DO     • ondansetron  4 mg Oral Q6H PRN [facility], [facility]     • oxyCODONE  5 mg Oral Q6H PRN Reche Kathryn, DO     • oxyCODONE  2 5 mg Oral Q6H PRN Reche [place], DO     • pantoprazole  20 mg Oral Early Morning Reche [place], DO     • polyethylene glycol  17 g Oral QAM [facility], DO     • senna  2 tablet Oral Daily PRN [facility], DO     • senna-docusate sodium  2 tablet Oral BID [facility], DO     • tamsulosin  0 8 mg Oral Daily With Dinner ERIC Meredith     • ticagrelor  90 mg Oral Q12H [facility], DO     • traZODone  50 mg Oral HS Silverio Samaniego MD "      Chief Complaints:  Rehab follow-up     Subjective: On eval, patient denies significant back or other pain  He reports sleeping adequately  He denies nausea, lightheadedness, worsening strength or sensation speech, or other new complaints    ROS: A 10 point ROS was performed; negative except as noted above  Physical Exam:  05/23/23 0522 98 °F (36 7 °C) 63 18 150/70 -- 99 % None (Room air)     • Vitals above reviewed on date of encounter\    GEN:  Lying in bed in NAD   HEENT/NECK: MMM  CARDIAC: Regular rate rhythm, no murmers, no rubs, no gallops  LUNGS:  clear to auscultation, no wheezes, rales, or rhonchi  ABDOMEN: Soft, non-tender, non-distended, normal active bowel sounds  EXTREMITIES/SKIN:  no calf edema, no calf tenderness to palpation  NEURO:   MENTAL STATUS: Aphasia at near recent baseline, adequate wakefulness, stable apraxia and Strength/MMT:  Stable  PSYCH:  Affect:  Euthymic    HPI:  Per admitting provider   \"Constanza Gibson is a 58 y o  male with history of prediabetes, hypertension, depression, urinary retention on Flomax, carotid stenosis, depression, prior left MCA and right MCA stroke with aphasia and memory deficits now on Aricept who presented to the [facility] on 4/26 for AMS witnessed by his wife with abnormal speech that was noted to be similar to his prior strokes  Of note he was recently in the hospital earlier in the month for a left MCA stroke  He also had a right MCA stroke status post thrombectomy back in 2019  He is a prior smoker and quit at the time of his initial stroke  Additionally he had hyponatremia which was felt to be due to HCTZ use and potentially poor oral intake, chronic low back pain  He was seen by neurology as well as neurosurgery and eventually switched from his Eliquis/aspirin to aspirin and Brilinta  It was felt not to be cardioembolic by neurosurgery    He was also not given TNK given his recent Eliquis use when he " "arrived  He had imaging prior on his last admission however now with new focus of diffusion abnormality in the right frontal parietal region and in the left periatrial and parieto-occipital region  No acute hemorrhage was seen  Had a carotid Doppler showing LICA 49-20%/IBWD <94%   IVY was done and did not show any PFO or thrombus  Given that patient was on Plavix when he had his stroke on 04/16/2023, he was placed on Lyndsay Crape was a question of vasculitis as the etiology but Neurosurgery saw him in consult and felt that it was likely related to atherosclerotic and carotid disease and they stopped the Eliquis and placed back on ASA   He had a cerebral arteriogram on 5/4 23 with a left ICA PTA/stent placement   There was no vasculitis noted    Per Neurology full anticoagulation did not need to be restarted  The patient was evaluated by the Rehabilitation team and deemed an appropriate candidate for comprehensive inpatient rehabilitation and admitted to the [facility] on 5/12/2023  2:35 PM\"    ** Please Note: Fluency Direct voice to text software may have been used in the creation of this document   **    I personally performed the required components and examined the patient myself in person on 5/23/23      "

## 2023-05-24 NOTE — TEAM CONFERENCE
Acute RehabilitationTe Conference Note  Date: 5/24/2023   Time: 11:42 AM       Patient Name:  Rell Manzo       Medical Record Number: [id number]   YOB: 1960  Sex:  Male          Room/Bed:  [facility] 971/[facility] 971-01  Payor Info:  Payor: [employer]  REP / Plan: [employer]  REP / Product Type: Medicare PPO /      Admitting Diagnosis: Stroke ([place] [address] ) [I63 9]   Admit Date/Time:  5/12/2023  2:35 PM  Admission Comments: No comment available     Primary Diagnosis:  Stroke [facility])  Principal Problem: Stroke [facility])    Patient Active Problem List    Diagnosis Date Noted   • Abnormal laboratory test 05/15/2023   • Leukocytosis 05/12/2023   • History of tobacco use 05/12/2023   • Chronic ischemic left MCA stroke 05/12/2023   • Hypokalemia 05/12/2023   • Abdominal aortic aneurysm (AAA) ([place] [address] ) 05/08/2023   • Tachycardia 05/05/2023   • Prediabetes 04/27/2023   • SIRS (systemic inflammatory response syndrome) ([place] [address] ) 04/27/2023   • Chronic anticoagulation 04/26/2023   • Stroke ([place] [address] ) 04/26/2023   • Hyponatremia 04/26/2023   • Middle cerebral artery stenosis, right 04/17/2023   • Left posterior MCA stroke - etiology unclear at this time 04/17/2023   • Chronic low back pain 04/16/2023   • Hypertensive encephalopathy, transient 01/12/2023   • Snoring 08/10/2020   • Memory deficits 08/10/2020   • Chronic ischemic right MCA stroke 08/06/2019   • Status post placement of implantable loop recorder 04/06/2019   • Type 2 diabetes mellitus ([facility] ) 04/03/2019   • Anxiety and depression 03/29/2019   • Insomnia 03/29/2019   • Fall 03/28/2019   • 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 ([facility] ) 03/28/2019   • Urinary retention 03/27/2019   • At risk for venous thromboembolism (VTE) 03/26/2019   • Hypertriglyceridemia 03/26/2019   • Nicotine dependence 03/26/2019   • Bilateral carotid artery stenosis 03/26/2019   • Presbyopia 03/26/2019   • History of stroke 03/19/2019   • Headache 03/19/2019   • Primary hypertension 03/19/2019   • GERD (gastroesophageal reflux disease) 03/19/2019       Physical Therapy:    Weight Bearing Status: Full Weight Bearing  Transfers: Moderate Assistance, Assist of 2  Bed Mobility: Moderate Assistance, Assist of 2  Amulation Distance (ft): 100 feet  Ambulation: Assist of 2  Assistive Device for Ambulation: Hand Hold Assistance  Wheelchair Mobility Distance: 0 ft  Number of Stairs: 0  Discharge Recommendations: Home with:  [address] with[de-identified] Family Support, 24 Hour Assisteance, 24 Hour Supervision      pt requires 2 person assist to complete functional activities safely requiring use of AD, see above info for details of PT evaluation  Pt is a good rehab candidate with anticipated min A goals at least for household distance mobilities using Least restrictive AD with ELOS of 4 weeks  Skilled PT will work on therapeutic exercises, therapeutic activities, NMR, w/c mobility and gait training to improve overall functional indep in order for pt to return home safely with reduce risk for falls  Pt cont to need ModA x2 for all xfers SPT and functional mobility with HHA x2 for NPP with WC follow   Pt cont to lean and veer right side and cont with poor vision deficits and awareness shawn on right side  Pt will cont NMR/NPP   w/c mobility and gait training to improve overall functional indep in order for pt to return home safely with reduce risk for falls  Occupational Therapy:  Eating: Moderate Assistance  Grooming: Moderate Assistance  Bathing: Assist of 2 (if in stance for boaz/buttocks)  Bathing: Assist of 2 (if in stance for boaz/buttocks)  Upper Body Dressing: Minimal Assistance  Lower Body Dressing:  Moderate Assistance  Toileting: Assist of 2  Toilet Transfer: Assist of 2  Cognition: Exceptions to WNL  Cognition: Decreased Memory, Decreased Executive Functions, Decreased Attention, Decreased Comprehension, Decreased Safety, Impulsive  Orientation: Person, Place  Discharge Recommendations:  (pending progress)       Pt is demonstrating fair progress with occupational therapy and is progressing toward long term goals for ADL, IADL, and functional transfers/mobility  Pts long term goals for ADLs are Minimum assistance and Moderate assistance with wheelchair  Pt continues to present with impairments in activity tolerance, endurance, standing balance/tolerance, sitting balance/tolerance, UE strength, UE ROM, FMC, GMC, memory, insight, safety , judgement , attention , sequencing , sensation , visual perceptual skills , R/L discrimination , task initiation , task termination , (R) attention, proprioception , coping skills , communication, and interpersonal skills  Occupational performance remains limited by fatigue, (R) hemiparesis, (R) visual deficits , risk for falls, and home environment  Family training/education will be required prior to D/C  Pt will continue to benefit from skilled acute rehab OT services to address above mentioned barriers and maximize functional independence in baseline areas of occupation to meet established treatment goals with overall decreased burden of care  Plan of care to continue to focus on ADL Retraining , LB Dressing, UB dressing, George Dressing Techniques, Functional Cognition, Functional Attention, Assessment of Cognitive function, Short Term memory, MOCA, ACLS, Standing tolerance, Standing balance , UE NMR right, midline awareness, Fine motor coordination, Gross motor coordination, Fine motor strengthening , Gross motor strengthening , R attention, Visual perceptual skills, Visual scanning, DME training/education, Family training/education, Energy conservation training/education, healthy coping education, Leisure and social pursuits, community re-integration, sitting balance, and Core/trunk control/strengthening   Goals for the upcoming week are: increasing out of bed tolerance and sitting balance to facilitate ADL completion  Anticipate Re-team at this time          Speech Therapy:  Mode of Communication: Verbal  Speech/Language: Expressive Aphasia (and receptive deficits)  Cognition: Exceptions to WNL  Cognition: Decreased Memory, Decreased Executive Functions, Decreased Attention, Decreased Comprehension, Decreased Safety  Orientation: Person, Place, Time, Situation  Swallowing: Within Defined Limits  Diet Recommendations: Regular Diet, Thin  Discharge Recommendations:  (pending progress)  Speech/language evaluation was completed on initial evaluation where pt completed the Bedside WAB in which overall bedside aphasia score deems pt to demonstrate severely impaired language deficits  Additionally, pt also demonstrates Broca's type aphasia as per Bedside Aphasia Classification  Overall, pt is presenting with moderate to sever receptive language deficits and severe expressive language/communication deficits  Pt's speech is characterized by anomia, presence of paraphasias and use of neologisms as pt does present with some components of apraxia of speech  Pt at times able to elicit functional phrases and short sentences but is most often limited by decreased verbal expression  Regarding comprehension, pt benefits from the use of yes/no questions, along with direct commands/instructions  In additional to the above language deficits, pt presents with cognitive linguistic deficits with barriers presenting as deficits in the following areas: attention, processing, STM recall, working memory, LTM recall, orientation, problem solving, executive functions, safety awareness and insight into deficits  Pt is currently functioning at mod assist for comprehension, max assist for expression, max assist for problem solving and max to mod assist for memory  Levels of fatigue are also noted to impact both functional communication and cognitive linguistic skills   Additionally, pt was evaluated for swallow function where he is presenting with functional oropharyngeal swallow skills across baseline diet of regular solids/thin liquids, therefore no further dysphagia therapy is warranted at this time  However, pt is recommended for further skilled SLP services during acute rehab stay in order to improve both expressive/receptive language skills and to maximize overall functional communication abilities  Update from week of 5/23/2023: Pt conts to be followed for skilled SLP services targeting receptive and expressive communication needs  Pt has been making good progress towards his goals, improving in his independent communication skills for basic wants and needs  Pt conts with moderate receptive and moderate to severe expressive deficits at this time  Pt is improving in verbal expression skills of biographical and situational orientation information, benefitting most from phonemic and phrase completion cues  Pt also benefits from simple yes/no questions as well as one step following commands  However written expression skills still require mod to max cues for copying, suspect some Right inattention visual skills impacting this as well  Pt is highly impacted by fatigue with tasks, and benefits from breaks, increased cuing and change of task when increased frustration occurs  Family education was initiated with wife over the phone in regards to current communication strategies- wife reports slow improvement in speech each day  Pt currently funcitoning at max A problem solving and expression, mod A memory and comprehension  Barriers cont to include decreased ST/working memory, expressive/receptive aphasia, problem solving, safety, Right visual inattention/neglect, and 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 as well  Pt will cont to benefit from further skilled SLP services targeting speech/language/cognitive skills for increased independence and decreased burden of care upon safe discharge to next level of care         Nursing Notes:  Appetite: Fair  Diet Type: Cardiac, Diabetic                      Diet Patient/Family Education Complete: No (ongoing)                            Bladder: Catheter  Catheter Type: Jo  Bladder Patient/Family Education: No (jo inseted)  Bowel:  Incontinent     Bowel Patient/Family Education: Yes  Pain Location/Orientation: Location: Back  Pain Score: 0                       Hospital Pain Intervention(s): Medication (See MAR)  Pain Patient/Family Education: Yes  Medication Management/Safety  Injectable:  (heparin- will not need at d/c)  Safe Administration: No  Reason for non-safe administration: will need assist at d/c  Medication Patient/Family Education Complete: No    Pt with change in mental staus and Acute multifocal ischemic strokes Occurred in different arterial distributions VIY was negative for thrombus/PFO Felt not to be vasculitis and negative by a-gram 5/4/23 Continue ASA/Brilinta Continue statin Left ICA stenosis- Was noted to have all of the CVAs in last month have been in left anterior circulation S/p PTA/stent 5/4/23 Recheck carotid doppler 6 weeks and see NS Continue AP/statin Right ICA stenosis- To followup with Vasc surgery as OP Continue AP/statin LOOP recorder  Was placed 3/2019 Neuro wants it replaced = for OP to Cardiology  Leukocytosis- Had no fevers CXR and Procal were normal HTN-Home:  Toprol 100mg qd/Norvasc 10mg qd/Losartan 50mg qd/HCTZ 25mg qd/Hydralazine 25mg TID Here:  Toprol 100mg qd/Losartan 50mg qd/HCTZ 12 5mg qd HCTZ was decreased to 12 5mg qd today before transfer here to [facility]; stopped  starting 5/16/23 Pre DM-HbA1C 5 8 Takes Metformin at home but currently not on it in hospital-Wife didn't want him to have Accuchecks noting that he was pre-DM so they were stopped in hospital   The BSs had been very normal in acute anyway Will watch fastings with BMPs For DM diet which he had not been on in acute  Dr Tirado Both spoke with him about that and he was ok with that plan Depression-Continue celexa as at home Memory loss- Continue Aricept as at home He has had memory issues since his CVA in 2019 Urine retention-Has jo- Continue Flomax   Chronic LBP- MRI showed advanced multilevel spondylosis, slightly progressed on the right at L2-3 compared to the prior study but otherwise stable  AAA- Found on L-spine MRI Measures 3 4 cm  Hyponatremia-Na 132 Abnormal creatinine-Stop HCTZ  Will give 1 liter NSS x 1 l  BMP AM and hold Cozaar until see labs tomorrow 5/16/3  Pt has jo- is inc BM  Pt requires alarms for safety  5/24--Pt cont to require med adjustments for BP control  Pt remains inc of bowel  Pt failed void trial and jo reinserted by urology  Blood sugar checks stable and d/c  This week we will encourage independence with ADLs  We will monitor labs and vital signs  WE will educate pt/family about repositioning to prevent skin breakdown  We will assist w repositioning and perform routine skin checks  We will monitor for adequate pain control  We will monitor for constipation and medicate pt as ordered  We will increase safety awareness and keep pt free from falls  Case Management:     Discharge Planning  Living Arrangements: Lives w/ Spouse/significant other  Support Systems: Spouse/significant other  Assistance Needed: Unknown  Type of Current Residence: Other (Comment)  Current Home Care Services: No  Pt is participating and making gains with speech  Pts wife has been present to observe pt so far with therapies  Wife hopes to be able to take pt home on dc and is aware pt will most likely require assist  Family is scheduled to move in this summer to assist  Contd stay review conducted on 5/19 without determination received to date  Is the patient actively participating in therapies?  yes  List any modifications to the treatment plan:     Barriers Interventions   jo Urology following, voiding trial next week   Aphasia, decreased cog Speech therapy   Visual deficits on the right, field cut Cueing to attend to the right   Motor planning, apraxia Neuromuscular re ed   Family support Potential subacute setting     Is the patient making expected progress toward goals? yes  List any update or changes to goals:     Medical Goals: Patient will be medically stable for discharge to [facility] upon completion of rehab program and Patient will be able to manage medical conditions and comorbid conditions with medications and follow up upon completion of rehab program    Weekly Team Goals:   Rehab Team Goals  ADL Team Goal: Patient will require assist with ADLs with least restrictive device upon completion of rehab program  Transfer Team Goal: Patient will require assist with transfers with least restrictive device upon completion of rehab program  Locomotion Team Goal: Patient will require assist with locomotion with least restrictive device upon completion of rehab program  Cognitive Team Goal: Patient will require supervision for basic and complex tasks upon completion of rehab program    Discussion: pt presents with the above barriers and is functioning at assist of two for transfers, bed mobility, hand hold assist for walking with neuro joshua  Assist of one mod a with adls  Mod to severe expressive aphasia, benefits from simple commands and  Yes/no questions  Family has been present for observation with therapy and is concerned about functional gains  Family aware subacute  May be an option if overall mobility has not improved  Anticipated Discharge Date: reteam [facility] Team Members Present: The following team members are supervising care for this patient and were present during this Weekly Team Conference      Physician: Dr Mello Rdz MD  : REZA José  Registered Nurse: Karina Luevano RN  Physical Therapist: KATE HernandezT  Occupational Therapist: Kathleen Medina MS, OTR/L  Speech Therapist: Eduardo Acharya [address], [id number] [facility]

## 2023-05-24 NOTE — PLAN OF CARE
Problem: PAIN - ADULT  Goal: Verbalizes/displays adequate comfort level or baseline comfort level  Description: Interventions:  - Encourage patient to monitor pain and request assistance  - Assess pain using appropriate pain scale  - Administer analgesics based on type and severity of pain and evaluate response  - Implement non-pharmacological measures as appropriate and evaluate response  - Consider cultural and social influences on pain and pain management  - Notify physician/advanced practitioner if interventions unsuccessful or patient reports new pain  Outcome: Progressing     Problem: INFECTION - ADULT  Goal: Absence or prevention of progression during hospitalization  Description: INTERVENTIONS:  - Assess and monitor for signs and symptoms of infection  - Monitor lab/diagnostic results  - Monitor all insertion sites, i e  indwelling lines, tubes, and drains  - Monitor endotracheal if appropriate and nasal secretions for changes in amount and color  - [place] appropriate cooling/warming therapies per order  - Administer medications as ordered  - Instruct and encourage patient and family to use good hand hygiene technique  - Identify and instruct in appropriate isolation precautions for identified infection/condition  Outcome: Progressing     Problem: INFECTION - ADULT  Goal: Absence of fever/infection during neutropenic period  Description: INTERVENTIONS:  - Monitor WBC    Outcome: Progressing     Problem: SAFETY ADULT  Goal: Patient will remain free of falls  Description: INTERVENTIONS:  - Educate patient/family on patient safety including physical limitations  - Instruct patient to call for assistance with activity   - Consult OT/PT to assist with strengthening/mobility   - Keep Call bell within reach  - Keep bed low and locked with side rails adjusted as appropriate  - Keep care items and personal belongings within reach  - Initiate and maintain comfort rounds  - Make Fall Risk Sign visible to staff  - Offer Toileting every 2 Hours, in advance of need  - Initiate/Maintain bed/chair alarm  - Obtain necessary fall risk management equipment: non skid footwear  - Apply yellow socks and bracelet for high fall risk patients  - Consider moving patient to room near nurses station  Outcome: Progressing     Problem: SAFETY ADULT  Goal: Maintain or return to baseline ADL function  Description: INTERVENTIONS:  -  Assess patient's ability to carry out ADLs; assess patient's baseline for ADL function and identify physical deficits which impact ability to perform ADLs (bathing, care of mouth/teeth, toileting, grooming, dressing, etc )  - Assess/evaluate cause of self-care deficits   - Assess range of motion  - Assess patient's mobility; develop plan if impaired  - Assess patient's need for assistive devices and provide as appropriate  - Encourage maximum independence but intervene and supervise when necessary  - Involve family in performance of ADLs  - Assess for home care needs following discharge   - Consider OT consult to assist with ADL evaluation and planning for discharge  - Provide patient education as appropriate  Outcome: Progressing

## 2023-05-24 NOTE — PROGRESS NOTES
Physical Medicine and Rehabilitation Progress Note  Rocael Garcia 58 y o  male MRN: [id number]  Unit/Bed#: -01 Encounter: [id number]      Assessment & Plan:     Decline in ADLs and mobility: Functional assessment - improving         FIM  Care Score  Admit Score Recent Score    Total assist  1-100% or 2p    Tot ADLs    Max assist 2-51-75%    Sub  LBD   Mod assist 3- 26-50%  Par  Bathing   Min assist 3- 25% or < Par  UBD   CG assist 4  TA     Sup/Setup 4-5 Sup     Mod-I/Indep 6 MI      Transfers  Total assist  Total assist     Ambulation   Total assist  Total assist (30 ft mod assist x2)    Stairs   Total assist/NT      Goal: CGA-supervision for most ADLs and  for mobility  Major barriers:  Impaired cognition, speech, balance, deconditioning  Dispo: Home with ELOS 21+ days from admission      * Stroke [facility])  Assessment & Plan  5/22 - sleeping better; function/mobility improving   - 900 min program   Recent multifocal ischemic infarcts in different distributions of unclear etiology   · Imaging revealed multifocal strokes; underwent L ICA PTA/stenting 5/4 also hx of R ICA stenosis   · Has loop recorder placed 2019 and neuro wants OP replacement of loop recorder - OP cards f/u   · CT of the chest abdomen pelvis without evidence of malignancy done on the last admission on 4/17  · A-gram not c/w vasculitis   · Thrombosis panel done in 04/18/2023 that was only abnl for AT3 that was low in the acute setting and needs to be repeated in future  Repeat OP thrombosis panel thru neuro or hematology  · Recent IVY with no PFO  · Previous TTE 04/18/23 showed ED of 55% and nl wall motion and mildly dilated L atrium     · Continue Brilinta and aspirin as well as statin for secondary stroke prophylaxis   · Continue Physical therapy, Occupational Therapy, speech therapy  · Monitor neuro exam closely       Bilateral carotid artery stenosis  Assessment & Plan  · Status post left ICA angioplasty and stent placement neurovascular service  · Per IM- S/p L ICA PTA/stent 5/4/23;  Recheck carotid doppler 6 weeks and see NS  · Continue Brilinta, aspirin and statin  · BP mgmt per IM here  · OP vasc sx follow-up      Anxiety and depression  Assessment & Plan  Continues to sleep better, mood improving, tolerating med regimen  Anxiety, depression, significant insomnia with difficulty adjusting   Provided additional supportive counseling  Neuropsych c/s 5/22  Adjustment Disorder with Anxiety and Depression  R/O Post Stroke Depression  • Supportive psychotherapy, utilizing CBT and mindfulness strategies  • DBT distress tolerance techniques  to improve coping and mood  • Meditation and relaxation training  Continue chronic home Celexa 20mg qday   Continue recently started 50 mg trazodone at bedtime  -Monitor for signs and symptoms of excessive serotonin  -Monitor mood, efficacy, tolerance     Urinary retention  Assessment & Plan  - Urology c/s 5/19 for ongoing retention > jo re-inserted   - Continue flomax to 0 8mg qday, can trial voiding again before d/c if here for extended time > consider voiding trial likely next week  - Monitor for infection   - Optimal bowel mgmt, mobilize  - OP urology follow-up       Leukocytosis  Assessment & Plan  · WBC up to 14 K on 5/15 and down to 12 9 on 5/22; no s/s of infection but remains at risk; pt afebrile  · Comgmt with IM   · Monitor for s/s of infection or other etiologies; monitor vitals/labs     Hypertriglyceridemia  Assessment & Plan  Continue statin    Primary hypertension  Assessment & Plan  · Elevated some  · Mgmt per IM:  hydrochlorothiazide 12 5 mg daily, metoprolol succinate 100 mg daily as well as Cozaar 50 mg daily >IM may add additional meds soon   · Hctz may be stopped by IM     GERD (gastroesophageal reflux disease)  Assessment & Plan  Continue pantoprazole and as needed Tums    At risk for venous thromboembolism (VTE)  Assessment & Plan  SCDs, ambulation, and heparin       Abnormal laboratory "test  Assessment & Plan  AT3 89% on 4/2023 lab per prior providers; can be low from recent stroke and not true AT3 def  - Recommend follow-up with neurology and repeat thrombosis panel as an outpatient     Hypokalemia  Assessment & Plan  · Remains improved 5/16    Chronic ischemic left MCA stroke  Assessment & Plan  · Recent left MCA stroke in the beginning of April at 4/16/2023 with aphasia and was empirically treated with Eliquis 5 mg twice daily as well as aspirin 81 mg due to embolic stroke of undetermined source per prior documentation  · Despite this we presented with additional strokes for this admission    History of tobacco use  Assessment & Plan  · Patient with a history of tobacco use yet quit in 2019  · Does not need nicotine patches    Prediabetes  Assessment & Plan  · Last hemoglobin A1c of 5 8  · Mgmt per IM during [facility]   · Started on consistent carbohydrate 3 diet here in addition to cardiac diet on admission to [facility]    Chronic low back pain  Assessment & Plan  · Adequate control  · Trial APAP TID PRN - discussed with pt   · Per prior provider \"Continue to progress and wean at this time  Had a discussion with the patient on arrival after reviewing notes from Dr Lizbeth May, and review of PDMP patient was never previously on opioid therapy except for short course of tramadol  Here he has been on oxycodone 10 mg dosing for severe pain as well as Norco and oxycodone 5 mg  On admission spoke with patient and will change him to a oxycodone 2 5 mg for moderate pain and 5 mg for severe pain with the plan of overall weaning till he is no longer on this medication    No clear indication for chronic opioid therapy at this time\"  · Continue lidocaine patch and aqua K-pad but not in the same area at the same time    Memory deficits  Assessment & Plan  · Started on Aricept due to memory difficulties after stroke in 2019  · Currently on 5 mg twice daily  · Sees Dr Amanda Wynn in outpatient neurology    Chronic ischemic " "right MCA stroke  Assessment & Plan  · Patient with history of right MCA stroke back in March 2019 status post thrombectomy  · Had loop recorder placement without overt arrhythmia and was on Plavix 75 mg at that time  Also with known severe right M1 stenosis  · See \"Stroke\" mgmt above         Other Medical Issues:  • Monitor for     Follow-up providers and other issues to be followed up after discharge  PCP  Neuro  Cards  [facility]     CODE: Level 1: Full Code    Restrictions include: Fall precautions    Objective:     Allergies per EMR  Diagnostic Studies: Reviewed, no new imaging  No orders to display     See above as well    Laboratory: Labs reviewed  Results from last 7 days   Lab Units 05/22/23  0615 05/19/23  0618   HEMATOCRIT % 36 3* 37 3   HEMOGLOBIN g/dL 12 1 12 6   WBC Thousand/uL 12 96* 14 14*     Results from last 7 days   Lab Units 05/22/23  0615 05/18/23  0606   BUN mg/dL 16 21   CHLORIDE mmol/L 110* 109*   CREATININE mg/dL 1 03 1 02   POTASSIUM mmol/L 4 0 4 3              Drug regimen reviewed, all potential adverse effects identified and addressed:    Scheduled Meds:  Current Facility-Administered Medications   Medication Dose Route Frequency Provider Last Rate   • acetaminophen  975 mg Oral TID PRN Jerome Rangel MD     • aspirin  81 mg Oral Daily Lossie Goodell, DO     • atorvastatin  40 mg Oral Daily Lossie Goodell, DO     • bisacodyl  10 mg Rectal Daily PRN Lossie Goodell, DO     • calcium carbonate  1,000 mg Oral Daily PRN Lossie Goodell, DO     • citalopram  20 mg Oral Daily Lossie Goodell, DO     • donepezil  5 mg Oral BID Lossie Goodell, DO     • heparin (porcine)  5,000 Units Subcutaneous [facility] Lossie Goodell, DO     • lidocaine  1 patch Topical Daily Lossie Goodell, DO     • lidocaine   Topical Q4H PRN Jerome Rangel MD     • losartan  50 mg Oral Daily Haven Sit, CRNP     • methocarbamol  500 mg Oral Q6H PRN Lossie Goodell, DO     • metoprolol succinate  100 mg Oral Daily " "Rollo Amass, DO     • ondansetron  4 mg Oral Q6H PRN Rollo Amass, DO     • oxyCODONE  5 mg Oral Q6H PRN Rollo Amass, DO     • oxyCODONE  2 5 mg Oral Q6H PRN Rollo Amass, DO     • pantoprazole  20 mg Oral Early Morning Rollo Amass, DO     • polyethylene glycol  17 g Oral QAM Rollo Amass, DO     • senna  2 tablet Oral Daily PRN Rollo Amass, DO     • senna-docusate sodium  2 tablet Oral BID Rollo Amass, DO     • tamsulosin  0 8 mg Oral Daily With [address], CRNP     • ticagrelor  90 mg Oral Q12H [address], DO     • traZODone  50 mg Oral HS Cate Cronin MD         Chief Complaints:  Rehab follow-up     Subjective: On eval, patient reports sleeping well over the weekend  He denies overt tiredness or sleepiness  He reports mood a little bit better  Patient denies worsening strength, sensation, lightheadedness, pain, or other new complaints  ROS: A 10 point ROS was performed; negative except as noted above         Physical Exam:  05/22/23 0742 -- -- -- 158/72 -- -- -- Lying   05/22/23 0609 98 °F (36 7 °C) 77 16 177/81 Abnormal  117 96 % None (Room air)      • Vitals above reviewed on date of encounter    GEN:  Lying in bed in NAD   HEENT/NECK: MMM  CARDIAC: Regular rate rhythm, no murmers, no rubs, no gallops  LUNGS:  clear to auscultation, no wheezes, rales, or rhonchi  ABDOMEN: Soft, non-tender, non-distended, normal active bowel sounds  EXTREMITIES/SKIN:  no calf edema, no calf tenderness to palpation  NEURO:   MENTAL STATUS: Improving social interaction, stable aphasia to slightly improved and Strength/MMT:  Unchanged  PSYCH:  Affect:  Euthymic    HPI:  Per admitting provider   \"Constanza Paz is a 58 y o  male with history of prediabetes, hypertension, depression, urinary retention on Flomax, carotid stenosis, depression, prior left MCA and right MCA stroke with aphasia and memory deficits now on Aricept who presented to the [facility] on " "4/26 for AMS witnessed by his wife with abnormal speech that was noted to be similar to his prior strokes  Of note he was recently in the hospital earlier in the month for a left MCA stroke  He also had a right MCA stroke status post thrombectomy back in 2019  He is a prior smoker and quit at the time of his initial stroke  Additionally he had hyponatremia which was felt to be due to HCTZ use and potentially poor oral intake, chronic low back pain  He was seen by neurology as well as neurosurgery and eventually switched from his Eliquis/aspirin to aspirin and Brilinta  It was felt not to be cardioembolic by neurosurgery  He was also not given TNK given his recent Eliquis use when he arrived  He had imaging prior on his last admission however now with new focus of diffusion abnormality in the right frontal parietal region and in the left periatrial and parieto-occipital region  No acute hemorrhage was seen  Had a carotid Doppler showing LICA 05-87%/EITG <26%   IVY was done and did not show any PFO or thrombus  Given that patient was on Plavix when he had his stroke on 04/16/2023, he was placed on [place] was a question of vasculitis as the etiology but Neurosurgery saw him in consult and felt that it was likely related to atherosclerotic and carotid disease and they stopped the Eliquis and placed back on ASA   He had a cerebral arteriogram on 5/4 23 with a left ICA PTA/stent placement   There was no vasculitis noted    Per Neurology full anticoagulation did not need to be restarted  The patient was evaluated by the Rehabilitation team and deemed an appropriate candidate for comprehensive inpatient rehabilitation and admitted to the [facility] on 5/12/2023  2:35 PM\"    ** Please Note: Fluency Direct voice to text software may have been used in the creation of this document   **    I personally performed the required components and examined the patient myself in person on 5/22/23      "

## 2023-05-24 NOTE — PROGRESS NOTES
Physical Medicine and Rehabilitation Progress Note  Andrey Noss 58 y o  male MRN: [id number]  Unit/Bed#: -01 Encounter: [id number]      Assessment & Plan:     Decline in ADLs and mobility: Functional assessment - slowly improving         FIM  Care Score  Admit Score Recent Score    Total assist  1-100% or 2p    Tot ADLs    Max assist 2-51-75%    Sub  LBD   Mod assist 3- 26-50%  Par  Bathing   Min assist 3- 25% or < Par  UBD   CG assist 4  TA     Sup/Setup 4-5 Sup     Mod-I/Indep 6 MI      Transfers  Total assist  Total assist     Ambulation   Total assist  Total assist (30 ft mod assist x2)    Stairs   Total assist/NT      Goal: CGA-supervision for most ADLs and  for mobility  Major barriers:  Impaired cognition, speech, balance, deconditioning  Dispo: Home v SNF with ELOS 21+ days from admission      * Stroke [facility])  Assessment & Plan  5/24 - neuro exam stable   5/23 - improving mobility and ADLs   - 900 min program   Recent multifocal ischemic infarcts in different distributions of unclear etiology   · Imaging revealed multifocal strokes; underwent L ICA PTA/stenting 5/4 also hx of R ICA stenosis   · Has loop recorder placed 2019 and neuro wants OP replacement of loop recorder - OP cards f/u   · CT of the chest abdomen pelvis without evidence of malignancy done on the last admission on 4/17  · A-gram not c/w vasculitis   · Thrombosis panel done in 04/18/2023 that was only abnl for AT3 that was low in the acute setting and needs to be repeated in future  Repeat OP thrombosis panel thru neuro or hematology  · Recent IVY with no PFO  · Previous TTE 04/18/23 showed ED of 55% and nl wall motion and mildly dilated L atrium     · Continue Brilinta and aspirin as well as statin for secondary stroke prophylaxis   · Continue Physical therapy, Occupational Therapy, speech therapy  · Monitor neuro exam closely       Bilateral carotid artery stenosis  Assessment & Plan  · Status post left ICA angioplasty and stent placement neurovascular service  · Per IM- S/p L ICA PTA/stent 5/4/23;  Recheck carotid doppler 6 weeks and see NS - has OP follow-up with Dr Jim Baeza 6/26  · Continue Brilinta, aspirin and statin  · BP mgmt per IM here  · OP vasc sx follow-up      Anxiety and depression  Assessment & Plan  Continues to sleep better, mood stable, tolerating med regimen  Anxiety, depression, significant insomnia with difficulty adjusting   Provided additional supportive counseling  Neuropsych c/s 5/22  Adjustment Disorder with Anxiety and Depression  R/O Post Stroke Depression  • Supportive psychotherapy, utilizing CBT and mindfulness strategies  • DBT distress tolerance techniques  to improve coping and mood  • Meditation and relaxation training  Continue chronic home Celexa 20mg qday   Continue recently started 50 mg trazodone at bedtime  -Monitor for signs and symptoms of excessive serotonin  -Monitor mood, efficacy, tolerance     Urinary retention  Assessment & Plan  - Urology c/s 5/19 for ongoing retention > jo re-inserted   - Continue flomax to 0 8mg qday, can trial voiding again before d/c next week   - Monitor for infection   - Optimal bowel mgmt, mobilize  - OP urology follow-up       Leukocytosis  Assessment & Plan  · CBC tomorrow   · WBC up to 14 K on 5/15 and down to 12 9 on 5/22; no s/s of infection but remains at risk; pt afebrile  · Comgmt with IM   · Monitor for s/s of infection or other etiologies; monitor vitals/labs     Hypertriglyceridemia  Assessment & Plan  Continue statin    Primary hypertension  Assessment & Plan  · Acceptable  · Mgmt per IM: ly, metoprolol succinate 100 mg daily as well as Cozaar 50 mg daily   · Hctz stopped    GERD (gastroesophageal reflux disease)  Assessment & Plan  Continue pantoprazole and as needed Tums    At risk for venous thromboembolism (VTE)  Assessment & Plan  SCDs, ambulation, and heparin       Abnormal laboratory test  Assessment & Plan  AT3 89% on 4/2023 lab per prior "providers; can be low from recent stroke and not true AT3 def  - Recommend follow-up with neurology and repeat thrombosis panel as an outpatient     Hypokalemia  Assessment & Plan  · BMP tomorrow   · Remains improved     Chronic ischemic left MCA stroke  Assessment & Plan  · Recent left MCA stroke in the beginning of April at 4/16/2023 with aphasia and was empirically treated with Eliquis 5 mg twice daily as well as aspirin 81 mg due to embolic stroke of undetermined source per prior documentation  · Despite this we presented with additional strokes for this admission    History of tobacco use  Assessment & Plan  · Patient with a history of tobacco use yet quit in 2019  · Does not need nicotine patches    Prediabetes  Assessment & Plan  · Last hemoglobin A1c of 5 8  · Mgmt per IM during [facility]   · Started on consistent carbohydrate 3 diet here in addition to cardiac diet on admission to [facility]    Chronic low back pain  Assessment & Plan  · Adequate control  · APAP TID PRN - discussed with pt   · Robaxin 500mg Q6H PRN spasms   · Oxy 2 5-5mg Q6H PRN   · Continue lidocaine patch and aqua K-pad but not in the same area at the same time    Memory deficits  Assessment & Plan  · Started on Aricept due to memory difficulties after stroke in 2019  · Currently on 5 mg twice daily  · Sees Dr Clifford Cotto in outpatient neurology    Chronic ischemic right MCA stroke  Assessment & Plan  · Patient with history of right MCA stroke back in March 2019 status post thrombectomy  · Had loop recorder placement without overt arrhythmia and was on Plavix 75 mg at that time  Also with known severe right M1 stenosis  · See \"Stroke\" mgmt above         Other Medical Issues:  • Monitor for     Follow-up providers and other issues to be followed up after discharge  PCP  Neuro  Cards  Vasc Sx     CODE: Level 1: Full Code    Restrictions include: Fall precautions    Objective:     Allergies per EMR  Diagnostic Studies: Reviewed, no new imaging  No orders " to display     See above as well    Laboratory: Labs reviewed  Results from last 7 days   Lab Units 05/22/23  0615   HEMATOCRIT % 36 3*   HEMOGLOBIN g/dL 12 1   WBC Thousand/uL 12 96*     Results from last 7 days   Lab Units 05/22/23  0615   BUN mg/dL 16   CHLORIDE mmol/L 110*   CREATININE mg/dL 1 03   POTASSIUM mmol/L 4 0              Drug regimen reviewed, all potential adverse effects identified and addressed:    Scheduled Meds:  Current Facility-Administered Medications   Medication Dose Route Frequency Provider Last Rate   • acetaminophen  975 mg Oral TID PRN Guille Toth MD     • aspirin  81 mg Oral Daily Severo , DO     • atorvastatin  40 mg Oral Daily Severo , DO     • bisacodyl  10 mg Rectal Daily PRN Severo , DO     • calcium carbonate  1,000 mg Oral Daily PRN Severo , DO     • citalopram  20 mg Oral Daily Severo , DO     • donepezil  5 mg Oral BID Severo , DO     • heparin (porcine)  5,000 Units Subcutaneous Q8H [facility] Severo , DO     • lidocaine  1 patch Topical Daily Severo , DO     • lidocaine   Topical Q4H PRN Guille Toth MD     • losartan  50 mg Oral Daily ERIC Kerns     • methocarbamol  500 mg Oral Q6H PRN Severo , DO     • metoprolol succinate  100 mg Oral Daily Severo , DO     • ondansetron  4 mg Oral Q6H PRN Severo , DO     • oxyCODONE  5 mg Oral Q6H PRN Severo , DO     • oxyCODONE  2 5 mg Oral Q6H PRN Severo , DO     • pantoprazole  20 mg Oral Early Morning Severo , DO     • polyethylene glycol  17 g Oral QAM Severo , DO     • senna  2 tablet Oral Daily PRN Severo , DO     • senna-docusate sodium  2 tablet Oral BID Severo , DO     • tamsulosin  0 8 mg Oral Daily With [address] ERIC Perez     • ticagrelor  90 mg Oral Q12H [facility] Severo , DO     • traZODone  50 mg Oral HS Guille Toth MD         Chief Complaints:  Rehab follow-up     Subjective: "  On eval, patient denies lightheadedness, SOB, difficulty sleeping, worsening strength, speech, fever, or other new complaints  ROS: A 10 point ROS was performed; negative except as noted above  Physical Exam:  Vitals:    05/24/23 0813   BP: 120/70   Pulse: 70   Resp:    Temp:    SpO2:    Vitals above reviewed on date of encounter      GEN:  Lying in bed in NAD   HEENT/NECK: MMM  CARDIAC: Regular rate rhythm, no murmers, no rubs, no gallops  LUNGS:  clear to auscultation, no wheezes, rales, or rhonchi  ABDOMEN: Soft, non-tender, non-distended, normal active bowel sounds  EXTREMITIES/SKIN:  no calf edema, no calf tenderness to palpation  NEURO:   MENTAL STATUS: Adequate wakefulness, improving speech and Strength/MMT:  Stable  PSYCH:  Affect:  Euthymic    HPI:  Per admitting provider   \"Constanza Nolasco is a 58 y o  male with history of prediabetes, hypertension, depression, urinary retention on Flomax, carotid stenosis, depression, prior left MCA and right MCA stroke with aphasia and memory deficits now on Aricept who presented to the [facility] on 4/26 for AMS witnessed by his wife with abnormal speech that was noted to be similar to his prior strokes  Of note he was recently in the hospital earlier in the month for a left MCA stroke  He also had a right MCA stroke status post thrombectomy back in 2019  He is a prior smoker and quit at the time of his initial stroke  Additionally he had hyponatremia which was felt to be due to HCTZ use and potentially poor oral intake, chronic low back pain  He was seen by neurology as well as neurosurgery and eventually switched from his Eliquis/aspirin to aspirin and Brilinta  It was felt not to be cardioembolic by neurosurgery  He was also not given TNK given his recent Eliquis use when he arrived    He had imaging prior on his last admission however now with new focus of diffusion abnormality in the right frontal parietal region and in " "the left periatrial and parieto-occipital region  No acute hemorrhage was seen  Had a carotid Doppler showing LICA 99-55%/CXVY <42%   IVY was done and did not show any PFO or thrombus  Given that patient was on Plavix when he had his stroke on 04/16/2023, he was placed on [place] was a question of vasculitis as the etiology but Neurosurgery saw him in consult and felt that it was likely related to atherosclerotic and carotid disease and they stopped the Eliquis and placed back on ASA   He had a cerebral arteriogram on 5/4 23 with a left ICA PTA/stent placement   There was no vasculitis noted    Per Neurology full anticoagulation did not need to be restarted  The patient was evaluated by the Rehabilitation team and deemed an appropriate candidate for comprehensive inpatient rehabilitation and admitted to the [facility] on 5/12/2023  2:35 PM\"    ** Please Note: Fluency Direct voice to text software may have been used in the creation of this document  **    50 minutes or greater spent for this encounter which included a combination of face-to-face time with the patient and non-face-to-face time which in part specifically includes management of CVA  Face-to-face time included extended discussion with patient regarding current condition, medical history, mood, medical/rehabilitation management, and disposition  Non face-to-face time included coordination of care with patient's co-managing AP and/or physician(s) thru communication and review of their recent documentation as well as reviewing vitals, bowel/bladder function, recent labs, and notes from therapy, CM, and nursing  In addition, this patient was discussed by the interdisciplinary team in weekly case conference today  The care of the patient was extensively discussed with all care providers and an appropriate rehabilitation plan was formulated unique for this patient   Barriers were identified preventing progression of therapy and appropriate interventions " were discussed with each discipline  Please see the team note for input from all disciplines regarding barriers, intervention, and discharge planning    I personally performed the required components and examined the patient myself in person on 5/24/23

## 2023-05-24 NOTE — PROGRESS NOTES
05/24/23 0900   Pain Assessment   Pain Assessment Tool 0-10   Pain Score No Pain   Pain Location/Orientation Location: Back   Restrictions/Precautions   Precautions Aphasia; Aspiration;Bed/chair alarms;Cognitive; Fall Risk;Supervision on toilet/commode;Visual deficit; [place]; Impulsive;Hard of hearing   Braces or Orthoses   (trailed AFO X 2 walks of 30 feet then c/o pain on his toes, switched to ace wrap to prevent inversion rolling)   Cognition   Arousal/Participation Cooperative   Attention Attends with cues to redirect   Orientation Level Oriented to person   Memory Decreased short term memory;Decreased recall of precautions   Following Commands Follows one step commands inconsistently   Subjective   Subjective Pt  reported that he slept well last night, is ready to participate in therapy   Roll Left and Right   Type of Assistance Needed Physical assistance   Physical Assistance Level 25% or less   Roll Left and Right CARE Score 3   Sit to Lying   Type of Assistance Needed Physical assistance   Physical Assistance Level Total assistance   Comment A x 2, pt  went into extensor tone and was sliding off bed, pt  stated he needed to have a BM and was holding his breath   Sit to Lying CARE Score 1   Lying to Sitting on Side of Bed   Type of Assistance Needed Physical assistance   Physical Assistance Level 51%-75%   Lying to Sitting on Side of Bed CARE Score 2   Sit to Stand   Type of Assistance Needed Physical assistance   Physical Assistance Level 26%-50%   Sit to Stand CARE Score 3   Bed-Chair Transfer   Type of Assistance Needed Physical assistance   Physical Assistance Level Total assistance   Chair/Bed-to-Chair Transfer CARE Score 1   Transfer Bed/Chair/Wheelchair   Adaptive Equipment None   Walk 10 Feet   Type of Assistance Needed Physical assistance   Physical Assistance Level Total assistance   Comment mod to max A X 1 with CF using L railing   Walk 10 Feet CARE Score 1   Walk 50 Feet with Two Turns   Reason if not Attempted Safety concerns   Walk 50 Feet with Two Turns CARE Score 88   Walk 150 Feet   Reason if not Attempted Safety concerns   Walk 150 Feet CARE Score 88   Ambulation   Primary Mode of Locomotion Prior to Admission Walk   Distance Walked (feet) 30 ft  (X 4 using L railing, 20 feet B HHA)   Assist Device Hand Hold  (B HHA initially and then L railing)   Gait Pattern Decreased foot clearance;R foot drag;Improper weight shift; Lateral deviation  (R LE toeing out)   Limitations Noted In Balance; Endurance; Coordination;Posture;Midline Orientation; Safety   Provided Assistance with: Balance;Direction;Weight Shift   Walk Assist Level Maximum Assist;Assist x 1;Chair Follow   Findings max A x 2 with HHA   Does the patient walk? 2  Yes   Therapeutic Interventions   Balance Co tx with OT standing infornt of table top improving midline orientation, wt  shifting and standing tolerance  Assessment   Treatment Assessment Pt  engaged in first 60 mins session focused on bed mobility OOB and NMR/NPP ambulation using L railing to improve wt  shifting , foot clearance and posture  Pt  initially walked using B HHA but pt  leaning heavily to the R and was pulling on this therapist hand on the L  Pt  did better using L railing , was truning his head to the L but better with visual feedback using mirror and verbal cues for reminder  Trialed AFO today to prevent rolling of foot in, it did work but pt/ c/o toe pain after 2 walks  Pt  sneakers might be too narrow for MAFO  trialed ace wrap instead and was helpful as well  Pt  then participated in 30 min of PT/OT co tx working on standing balance and vision for pt  Pt  assited back to bed but ended up using bedpan with x 2 assist needed to complete toileting task  No complaints reported by pt  Cont with POC as tolerated  Will trial BWSS tomorrow if safe  Problem List Decreased strength;Decreased endurance; Impaired balance;Decreased mobility; Decreased coordination;Decreased cognition; Impaired judgement;Decreased safety awareness; Impaired vision   Barriers to Discharge Inaccessible home environment;Decreased caregiver support   PT Barriers   Functional Limitation Car transfers;Stair negotiation;Standing;Transfers; Walking; Wheelchair management   Plan   Treatment/Interventions Functional transfer training;LE strengthening/ROM; Elevations; Therapeutic exercise; Endurance training;Patient/family training;Equipment eval/education; Bed mobility;Gait training   Recommendation   PT Discharge Recommendation   (TBD, most likely sub acute rehab pending progress)   PT Therapy Minutes   PT Time In 0900   PT Time Out 1030   PT Total Time (minutes) 90   PT Mode of treatment - Individual (minutes) 60   PT Mode of treatment - Concurrent (minutes) 0   PT Mode of treatment - Group (minutes) 0   PT Mode of treatment - Co-treat (minutes) 30   PT Mode of Treatment - Total time(minutes) 90 minutes   PT Cumulative Minutes 883   Therapy Time missed   Time missed?  No

## 2023-05-24 NOTE — PROGRESS NOTES
"OT Daily Treatment Note       05/24/23 1300   Pain Assessment   Pain Assessment Tool 0-10   Pain Score No Pain   Restrictions/Precautions   Precautions Aphasia; Aspiration;Bed/chair alarms;Cognitive; Fall Risk; Cortez; Supervision on toilet/commode;Visual deficit   Lifestyle   Autonomy \"my wife is overwhelmed yes\"   Cognition   Overall Cognitive Status Impaired   Arousal/Participation Cooperative   Attention Attends with cues to redirect   Orientation Level Oriented to person   Memory Decreased short term memory;Decreased recall of precautions   Following Commands Follows one step commands inconsistently   Vision   Vision Comments completed informal visual screening both binoncularly and with L vision occluded  pt able to scan better binonculary but continues to have decreased sustained attention with decreased coordination and R inattention as pt would lose the target to the R of midline and would req max vc to locate  pt did not report any double vision but did c/o inc dizziness with convergence  language is biggest barrier to determining pts deficits but from what was observed today, pt would benefit from sustaining attention at a static target to then completing tasks with R and L scanning  Additional Activities   Additional Activities Other (Comment)   Additional Activities Comments pt engaged in language activity of verbalizing 20 household items  pt req phonetic cues for 13/20 items  pt was seen mouthing the correct word but could not verbalize it outloud  continued practice with language would be very beneficial for the pt at this time with the goal of decreasing the severity of the pts expressive aphasia  Activity Tolerance   Activity Tolerance Patient tolerated treatment well   Other Comments   Assessment OT placed phone call to pts wife with pt present to setup formalized hands on family training to determine DC location to home vs [place]  OT verbalized that the pt was present for this phone call   When asking " "to schedule a time, spouse reported that she knows how the pt is functioning currently and she cannot care for him at home as she has to work  OT educated that the goal would be for the pt to be an assist of one for transfers  Spouse then stated \"he has a jo, he sh*ts himself and cannot even use a TV remote  I cannot take him home like this  If he was where he was at with his last stroke then thats fine but I need to work and I cant keep taking time off for this  \" OT then took spouse off speaker as the pt was visibly upset by what the spouse said  OT then educated that if there is no chance of [address] home then the team will be in touch to obtain choices  Spouse was in agreement and phone call ended  Upon going back into the pts room, the pt was seen crying and reported his wife is overwhelmed  OT provided emotional support, made the pt comfortable in bed and the session ended  OT informed CM [employer] and Dr Sharon Medina of conversation with wife  Assessment   Treatment Assessment Pt participated in skilled OT session with focus on visual perceptual abilities, language and family meeting  See flowsheet for details of session and current functional status  Pt is limited by weakness, decreased ROM, impaired balance, decreased endurance, decreased coordination, increased fall risk, pain, decreased activity tolerance, decreased safety awareness, impaired judgement, impaired tone, decreased cognition, decreased sensation, decreased strength and visual deficits and requires skilled OT services to increase independence and safety with ADL completion in prep for DC SNF   Plan to continue ADL retraining, functional transfer training, UE strengthening/ROM, endurance training, cognitive reorientation, Pt/caregiver education, equipment evaluation/education, neuro re-ed, fine motor coordination activities, compensatory technique education, continued education, energy conservation and activity engagement  to address barriers mentioned " above    Prognosis Fair   Problem List Decreased strength;Decreased endurance; Impaired balance;Decreased mobility; Decreased coordination;Decreased cognition; Impaired judgement;Decreased safety awareness; Impaired vision   Barriers to Discharge Inaccessible home environment;Decreased caregiver support   Plan   Treatment/Interventions ADL retraining;Functional transfer training; Therapeutic exercise; Endurance training;Patient/family training; Compensatory technique education   Progress Progressing toward goals   OT Therapy Minutes   OT Time In 1300   OT Time Out 1400   OT Total Time (minutes) 60   OT Mode of treatment - Individual (minutes) 60   OT Mode of treatment - Concurrent (minutes) 0   OT Mode of treatment - Group (minutes) 0   OT Mode of treatment - Co-treat (minutes) 0   OT Mode of Treatment - Total time(minutes) 60 minutes   OT Cumulative Minutes 865   Therapy Time missed   Time missed?  No

## 2023-05-24 NOTE — PROGRESS NOTES
Internal Medicine Progress Note  Patient: Shelbi Mcdonnell  Age/sex: 58 y o  male  Medical Record #: [id number]      ASSESSMENT/PLAN: (Interval History)  Shelbi Mcdonnell is seen and examined and management for following issues:    Acute multifocal ischemic strokes  • Occurred in different arterial distributions  • IVY was negative for thrombus/PFO  • Felt not to be vasculitis and negative by a-gram 5/4/23  • Continue ASA/Brilinta/statin     Left ICA stenosis  • Was noted to have all of the CVAs in last month have been in left anterior circulation  • S/p PTA/stent 5/4/23  • Recheck carotid doppler 6 weeks and see NS  • Continue AP/statin     Right ICA stenosis  • To followup with Vasc surgery as OP  • Continue AP/statin     LOOP recorder  • Was placed 3/2019  • Neuro wants it to be replaced = for OP to Cardiology     Leukocytosis  • Had no fevers  • CXR and Procal were normal in the hospital  • Mild/stable     HTN  • Home:  Toprol 100mg qd/Norvasc 10mg qd/Losartan 50mg qd/HCTZ 25mg qd/Hydralazine 25mg TID  • Here:  Toprol 100mg qd/Losartan 50mg qd  • stable     Pre DM  • HbA1C 5 8  • Takes Metformin at home but currently not on it in hospital  • Accuchecks were stable and dc'd in the hospital  • Monitor FBS with BMPs and continue DM diet  • FBS was 90 on 5/22/23     Depression  • Continue Celexa as at home  • Recommend neuropsychology eval     Memory loss  • Continue Aricept as at home  • He has had memory issues since his CVA in 2019     Urine retention  • Has jo placed 5/8/23  • VT 5/18 failed and jo replaced by Urology 5/19/23 and is not to be removed  • Continue Flomax     Chronic LBP  • MRI showed advanced multilevel spondylosis, slightly progressed on the right at L2-3 compared to the prior study but otherwise stable  • Pain management per PMR     AAA  • Found on L-spine MRI  • Measures 3 4 cm  • OP followup     Hyponatremia  • Stopped HCTZ 5/16/23    • Resolved off of HCTZ and had been given IVF     Discharge date:  [facility]       The above assessment and plan was reviewed and updated as determined by my evaluation of the patient on 5/24/2023      Labs:   Results from last 7 days   Lab Units 05/22/23  0615 05/19/23  0618   HEMATOCRIT % 36 3* 37 3   HEMOGLOBIN g/dL 12 1 12 6   PLATELETS Thousands/uL 294 316   WBC Thousand/uL 12 96* 14 14*     Results from last 7 days   Lab Units 05/22/23  0615 05/18/23  0606   BUN mg/dL 16 21   CALCIUM mg/dL 9 1 9 7   CHLORIDE mmol/L 110* 109*   CO2 mmol/L 25 26   CREATININE mg/dL 1 03 1 02   POTASSIUM mmol/L 4 0 4 3   SODIUM mmol/L 137 137             Results from last 7 days   Lab Units 05/19/23  1045   POC GLUCOSE mg/dl 177*       Review of Scheduled Meds:  Current Facility-Administered Medications   Medication Dose Route Frequency Provider Last Rate   • acetaminophen  975 mg Oral TID PRN Aman Verdugo MD     • aspirin  81 mg Oral Daily Martinez Rabago, DO     • atorvastatin  40 mg Oral Daily Martinez Rabago, DO     • bisacodyl  10 mg Rectal Daily PRN Martinez Rabago, DO     • calcium carbonate  1,000 mg Oral Daily PRN Martinez Rabago, DO     • citalopram  20 mg Oral Daily Martinez Rabago, DO     • donepezil  5 mg Oral BID Martinez Rabago, DO     • heparin (porcine)  5,000 Units Subcutaneous Q8H [facility] Martinez Rabago, DO     • lidocaine  1 patch Topical Daily Martinez Rabago, DO     • lidocaine   Topical Q4H PRN Aman Verdugo MD     • losartan  50 mg Oral Daily ERIC Trotter     • methocarbamol  500 mg Oral Q6H PRN Martinez Rabago, DO     • metoprolol succinate  100 mg Oral Daily Martinez Rabago, DO     • ondansetron  4 mg Oral Q6H PRN Martinez Rabago, DO     • oxyCODONE  5 mg Oral Q6H PRN Martinez Rabago, DO     • oxyCODONE  2 5 mg Oral Q6H PRN Martinez Rabago, DO     • pantoprazole  20 mg Oral Early Morning Martinez Rabago, DO     • polyethylene glycol  17 g Oral QAM Martinez Rabago, DO     • senna  2 tablet Oral Daily PRN Martinez Rabago, DO     • senna-docusate sodium  2 tablet Oral BID Sukhdev Alonso DO     • tamsulosin  0 8 mg Oral Daily With [address], CRNP     • ticagrelor  90 mg Oral Q12H [address] Sukhdev Alonso DO     • traZODone  50 mg Oral HS Mani Martínez MD         Subjective/ HPI: Patient seen and examined  Patients overnight issues or events were reviewed with nursing or staff during rounds or morning huddle session  New or overnight issues include the following:     No new or overnight issues  Offers no complaints    ROS:   A 10 point ROS was performed; negative except as noted above  Imaging:     No orders to display       *Labs /Radiology studies reviewed  *Medications reviewed and reconciled as needed  *Please refer to order section for additional ordered labs studies  *Case discussed with primary attending during morning huddle case rounds    Physical Examination:  Vitals:   Vitals:    05/23/23 1450 05/23/23 2031 05/24/23 0500 05/24/23 0813   BP: 124/58 114/56 145/71 120/70   BP Location: Left arm   Right arm   Pulse: 79 73 75 70   Resp: 19 18     Temp: 98 7 °F (37 1 °C) 98 2 °F (36 8 °C) 98 °F (36 7 °C)    TempSrc: Oral Oral Oral    SpO2: 97% 95%     Weight:   98 3 kg (216 lb 11 4 oz)        General Appearance: no distress, conversive  HEENT:  External ear normal   Nose normal w/o drainage  Mucous membranes are moist  Oropharynx is clear  Conjunctiva clear w/o icterus or redness  Neck:  Supple, normal ROM  Lungs: BBS without crackles/wheeze/rhonchi; respirations unlabored with normal inspiratory/expiratory effort  No retractions noted  On RA  CV: regular rate and rhythm; no rubs/murmurs/gallops, PMI normal   ABD: Abdomen is soft  Bowel sounds all quadrants  Nontender with no distention  EXT: no edema  Skin: normal turgor, normal texture, no rashes  Psych: affect normal, mood normal  Neuro: AA     The above physical exam was reviewed and updated as determined by my evaluation of the patient on 5/24/2023      Invasive Devices     Drain Duration           Urethral Catheter Coude 16 Fr  4 days                   VTE Pharmacologic Prophylaxis: Heparin  Code Status: Level 1 - Full Code  Current Length of Stay: 12 day(s)      Total time spent:  30 minutes with more than 50% spent counseling/coordinating care  Counseling includes discussion with patient re: progress  and discussion with patient of his/her current medical state/information  Coordination of patient's care was performed in conjunction with primary service  Time invested included review of patient's labs, vitals, and management of their comorbidities with continued monitoring  In addition, this patient was discussed with medical team including physician and advanced extenders  The care of the patient was extensively discussed and appropriate treatment plan was formulated unique for this patient  Medical decision making for the day was made by supervising physician unless otherwise noted in their attestation statement  ** Please Note:  voice to text software may have been used in the creation of this document   Although proof errors in transcription or interpretation are a potential of such software**

## 2023-05-24 NOTE — PLAN OF CARE
Problem: PAIN - ADULT  Goal: Verbalizes/displays adequate comfort level or baseline comfort level  Description: Interventions:  - Encourage patient to monitor pain and request assistance  - Assess pain using appropriate pain scale  - Administer analgesics based on type and severity of pain and evaluate response  - Implement non-pharmacological measures as appropriate and evaluate response  - Consider cultural and social influences on pain and pain management  - Notify physician/advanced practitioner if interventions unsuccessful or patient reports new pain  Outcome: Progressing     Problem: INFECTION - ADULT  Goal: Absence or prevention of progression during hospitalization  Description: INTERVENTIONS:  - Assess and monitor for signs and symptoms of infection  - Monitor lab/diagnostic results  - Monitor all insertion sites, i e  indwelling lines, tubes, and drains  - Monitor endotracheal if appropriate and nasal secretions for changes in amount and color  - Bruce appropriate cooling/warming therapies per order  - Administer medications as ordered  - Instruct and encourage patient and family to use good hand hygiene technique  - Identify and instruct in appropriate isolation precautions for identified infection/condition  Outcome: Progressing  Goal: Absence of fever/infection during neutropenic period  Description: INTERVENTIONS:  - Monitor WBC    Outcome: Progressing     Problem: SAFETY ADULT  Goal: Patient will remain free of falls  Description: INTERVENTIONS:  - Educate patient/family on patient safety including physical limitations  - Instruct patient to call for assistance with activity   - Consult OT/PT to assist with strengthening/mobility   - Keep Call bell within reach  - Keep bed low and locked with side rails adjusted as appropriate  - Keep care items and personal belongings within reach  - Initiate and maintain comfort rounds  - Make Fall Risk Sign visible to staff  - Offer Toileting every  Hours, in advance of need  - Initiate/Maintain alarm  - Obtain necessary fall risk management equipment:   - Apply yellow socks and bracelet for high fall risk patients  - Consider moving patient to room near nurses station  Outcome: Progressing  Goal: Maintain or return to baseline ADL function  Description: INTERVENTIONS:  -  Assess patient's ability to carry out ADLs; assess patient's baseline for ADL function and identify physical deficits which impact ability to perform ADLs (bathing, care of mouth/teeth, toileting, grooming, dressing, etc )  - Assess/evaluate cause of self-care deficits   - Assess range of motion  - Assess patient's mobility; develop plan if impaired  - Assess patient's need for assistive devices and provide as appropriate  - Encourage maximum independence but intervene and supervise when necessary  - Involve family in performance of ADLs  - Assess for home care needs following discharge   - Consider OT consult to assist with ADL evaluation and planning for discharge  - Provide patient education as appropriate  Outcome: Progressing  Goal: Maintains/Returns to pre admission functional level  Description: INTERVENTIONS:  - Perform BMAT or MOVE assessment daily    - Set and communicate daily mobility goal to care team and patient/family/caregiver  - Collaborate with rehabilitation services on mobility goals if consulted  - Perform Range of Motion3 times a day  - Reposition patient every 2 hours    - Dangle patient 3 times a day  - Stand patient 3 times a day  - Ambulate patient 3 times a day  - Out of bed to chair 3 times a day   - Out of bed for meals 3times a day  - Out of bed for toileting  - Record patient progress and toleration of activity level   Outcome: Progressing     Problem: DISCHARGE PLANNING  Goal: Discharge to home or other facility with appropriate resources  Description: INTERVENTIONS:  - Identify barriers to discharge w/patient and caregiver  - Arrange for needed discharge resources and transportation as appropriate  - Identify discharge learning needs (meds, wound care, etc )  - Arrange for interpretive services to assist at discharge as needed  - Refer to Case Management Department for coordinating discharge planning if the patient needs post-hospital services based on physician/advanced practitioner order or complex needs related to functional status, cognitive ability, or social support system  Outcome: Progressing     Problem: Prexisting or High Potential for Compromised Skin Integrity  Goal: Skin integrity is maintained or improved  Description: INTERVENTIONS:  - Identify patients at risk for skin breakdown  - Assess and monitor skin integrity  - Assess and monitor nutrition and hydration status  - Monitor labs   - Assess for incontinence   - Turn and reposition patient  - Assist with mobility/ambulation  - Relieve pressure over bony prominences  - Avoid friction and shearing  - Provide appropriate hygiene as needed including keeping skin clean and dry  - Evaluate need for skin moisturizer/barrier cream  - Collaborate with interdisciplinary team   - Patient/family teaching  - Consider wound care consult   Outcome: Progressing     Problem: MOBILITY - ADULT  Goal: Maintain or return to baseline ADL function  Description: INTERVENTIONS:  -  Assess patient's ability to carry out ADLs; assess patient's baseline for ADL function and identify physical deficits which impact ability to perform ADLs (bathing, care of mouth/teeth, toileting, grooming, dressing, etc )  - Assess/evaluate cause of self-care deficits   - Assess range of motion  - Assess patient's mobility; develop plan if impaired  - Assess patient's need for assistive devices and provide as appropriate  - Encourage maximum independence but intervene and supervise when necessary  - Involve family in performance of ADLs  - Assess for home care needs following discharge   - Consider OT consult to assist with ADL evaluation and planning for discharge  - Provide patient education as appropriate  Outcome: Progressing  Goal: Maintains/Returns to pre admission functional level  Description: INTERVENTIONS:  - Perform BMAT or MOVE assessment daily    - Set and communicate daily mobility goal to care team and patient/family/caregiver  - Collaborate with rehabilitation services on mobility goals if consulted  - Perform Range of Motion  times a day  - Reposition patient every hours  - Dangle patient  times a day  - Stand patient  times a day  - Ambulate patient  times a day  - Out of bed to chair  times a day   - Out of bed for meals  times a day  - Out of bed for toileting  - Record patient progress and toleration of activity level   Outcome: Progressing     Problem: Nutrition/Hydration-ADULT  Goal: Nutrient/Hydration intake appropriate for improving, restoring or maintaining nutritional needs  Description: Monitor and assess patient's nutrition/hydration status for malnutrition  Collaborate with interdisciplinary team and initiate plan and interventions as ordered  Monitor patient's weight and dietary intake as ordered or per policy  Utilize nutrition screening tool and intervene as necessary  Determine patient's food preferences and provide high-protein, high-caloric foods as appropriate       INTERVENTIONS:  - Monitor oral intake, urinary output, labs, and treatment plans  - Assess nutrition and hydration status and recommend course of action  - Evaluate amount of meals eaten  - Assist patient with eating if necessary   - Allow adequate time for meals  - Recommend/ encourage appropriate diets, oral nutritional supplements, and vitamin/mineral supplements  - Order, calculate, and assess calorie counts as needed  - Recommend, monitor, and adjust tube feedings and TPN/PPN based on assessed needs  - Assess need for intravenous fluids  - Provide specific nutrition/hydration education as appropriate  - Include patient/family/caregiver in decisions related to nutrition  Outcome: Progressing

## 2023-05-24 NOTE — PROGRESS NOTES
"OT Daily Treatment Note       05/24/23 1000   Pain Assessment   Pain Assessment Tool 0-10   Pain Score No Pain   Restrictions/Precautions   Precautions Aphasia; Aspiration;Bed/chair alarms;Cognitive; Fall Risk; Cortez;Pain;Supervision on toilet/commode;Visual deficit   Lifestyle   Autonomy \"oh I've gotta poop! \"   Roll Left and Right   Type of Assistance Needed Physical assistance   Physical Assistance Level 25% or less   Comment with use of bedrails HOB flat   Roll Left and Right CARE Score 3   Sit to Lying   Type of Assistance Needed Physical assistance   Physical Assistance Level Total assistance   Comment A x 2, pt  went into extensor tone and was sliding off bed due to needing to have a BM and wanting to avoid bowel incontinence   Sit to Lying CARE Score 1   Sit to Stand   Type of Assistance Needed Physical assistance   Physical Assistance Level 26%-50%   Comment Min A x 1 with cues for hand/foot placement and R knee block; 2nd person SBA but not necessarily required   Sit to Stand CARE Score 3   Bed-Chair Transfer   Type of Assistance Needed Physical assistance   Physical Assistance Level Total assistance   Comment Mod A x1, 2nd person SBA   Chair/Bed-to-Chair Transfer CARE Score 1   Toileting Hygiene   Type of Assistance Needed Physical assistance   Physical Assistance Level Total assistance   Comment TA x 1 supine for bed pan   Toileting Hygiene CARE Score 1   Toilet Transfer   Type of Assistance Needed Physical assistance   Physical Assistance Level Total assistance   Comment TA x 1 supine for bed pan   Toilet Transfer CARE Score 1   Neuromuscular Education   Comments pt engaged in cone activity in stance at tabletop focusing on L and R attention, visual scanning, functional reach, dynamic standing balance, motor planning and language  pt encouraged to locate cone, state the color and  and place to the side of the table   pt req mod vc to scan R but was able to complete task without need for physical " assistance  pt also demo dec ability to name color and required phonetic cueing to accurately verbalize color  Cognition   Overall Cognitive Status Impaired   Arousal/Participation Cooperative   Attention Attends with cues to redirect   Orientation Level Oriented to person   Memory Decreased short term memory;Decreased recall of precautions   Following Commands Follows one step commands inconsistently   Activity Tolerance   Activity Tolerance Patient tolerated treatment well   Assessment   Treatment Assessment Pt participated in skilled PT/OT session with OT focusing on visual scanning, language and ADL retraining while PT focused on standing tolerance, balance, transfer training and standing balance  See flowsheet for details of session and current functional status  Pt is limited by weakness, decreased ROM, impaired balance, decreased endurance, decreased coordination, increased fall risk, decreased ADLS, pain, decreased activity tolerance, decreased safety awareness, impaired judgement, decreased cognition, decreased sensation, decreased strength and visual deficits and requires skilled OT services to increase independence and safety with ADL completion in prep for DC location pending progress  Plan to continue ADL retraining, functional transfer training, UE strengthening/ROM, endurance training, cognitive reorientation, Pt/caregiver education, neuro re-ed, compensatory technique education, continued education, energy conservation and activity engagement  to address barriers mentioned above  Prognosis Fair   Problem List Decreased strength;Decreased endurance; Impaired balance;Decreased mobility; Decreased coordination;Decreased cognition; Impaired judgement;Decreased safety awareness; Impaired vision   Barriers to Discharge Inaccessible home environment;Decreased caregiver support   Plan   Treatment/Interventions ADL retraining;Functional transfer training; Therapeutic exercise; Endurance training;Cognitive reorientation;Patient/family training; Compensatory technique education   Progress Progressing toward goals   OT Therapy Minutes   OT Time In 1000   OT Time Out 1030   OT Total Time (minutes) 30   OT Mode of treatment - Individual (minutes) 0   OT Mode of treatment - Concurrent (minutes) 0   OT Mode of treatment - Group (minutes) 0   OT Mode of treatment - Co-treat (minutes) 30   OT Mode of Treatment - Total time(minutes) 30 minutes   OT Cumulative Minutes 805   Therapy Time missed   Time missed?  No

## 2023-05-25 LAB
ANION GAP SERPL CALCULATED.3IONS-SCNC: 2 MMOL/L (ref 4–13)
BASOPHILS # BLD AUTO: 0.16 THOUSANDS/ÂΜL (ref 0–0.1)
BASOPHILS NFR BLD AUTO: 1 % (ref 0–1)
BUN SERPL-MCNC: 23 MG/DL (ref 5–25)
CALCIUM SERPL-MCNC: 9 MG/DL (ref 8.3–10.1)
CHLORIDE SERPL-SCNC: 109 MMOL/L (ref 96–108)
CO2 SERPL-SCNC: 25 MMOL/L (ref 21–32)
CREAT SERPL-MCNC: 1.02 MG/DL (ref 0.6–1.3)
EOSINOPHIL # BLD AUTO: 0.87 THOUSAND/ÂΜL (ref 0–0.61)
EOSINOPHIL NFR BLD AUTO: 7 % (ref 0–6)
ERYTHROCYTE [DISTWIDTH] IN BLOOD BY AUTOMATED COUNT: 13.8 % (ref 11.6–15.1)
GFR SERPL CREATININE-BSD FRML MDRD: 77 ML/MIN/1.73SQ M
GLUCOSE P FAST SERPL-MCNC: 88 MG/DL (ref 65–99)
GLUCOSE SERPL-MCNC: 88 MG/DL (ref 65–140)
HCT VFR BLD AUTO: 35.1 % (ref 36.5–49.3)
HGB BLD-MCNC: 12 G/DL (ref 12–17)
IMM GRANULOCYTES # BLD AUTO: 0.1 THOUSAND/UL (ref 0–0.2)
IMM GRANULOCYTES NFR BLD AUTO: 1 % (ref 0–2)
LYMPHOCYTES # BLD AUTO: 3.26 THOUSANDS/ÂΜL (ref 0.6–4.47)
LYMPHOCYTES NFR BLD AUTO: 27 % (ref 14–44)
MCH RBC QN AUTO: 32.8 PG (ref 26.8–34.3)
MCHC RBC AUTO-ENTMCNC: 34.2 G/DL (ref 31.4–37.4)
MCV RBC AUTO: 96 FL (ref 82–98)
MONOCYTES # BLD AUTO: 1.25 THOUSAND/ÂΜL (ref 0.17–1.22)
MONOCYTES NFR BLD AUTO: 11 % (ref 4–12)
NEUTROPHILS # BLD AUTO: 6.29 THOUSANDS/ÂΜL (ref 1.85–7.62)
NEUTS SEG NFR BLD AUTO: 53 % (ref 43–75)
NRBC BLD AUTO-RTO: 0 /100 WBCS
PLATELET # BLD AUTO: 288 THOUSANDS/UL (ref 149–390)
PMV BLD AUTO: 10.2 FL (ref 8.9–12.7)
POTASSIUM SERPL-SCNC: 3.7 MMOL/L (ref 3.5–5.3)
RBC # BLD AUTO: 3.66 MILLION/UL (ref 3.88–5.62)
SODIUM SERPL-SCNC: 136 MMOL/L (ref 135–147)
WBC # BLD AUTO: 11.93 THOUSAND/UL (ref 4.31–10.16)

## 2023-05-25 PROCEDURE — 97112 NEUROMUSCULAR REEDUCATION: CPT

## 2023-05-25 PROCEDURE — 99232 SBSQ HOSP IP/OBS MODERATE 35: CPT | Performed by: INTERNAL MEDICINE

## 2023-05-25 PROCEDURE — 92507 TX SP LANG VOICE COMM INDIV: CPT

## 2023-05-25 PROCEDURE — 80048 BASIC METABOLIC PNL TOTAL CA: CPT | Performed by: NURSE PRACTITIONER

## 2023-05-25 PROCEDURE — 85025 COMPLETE CBC W/AUTO DIFF WBC: CPT | Performed by: NURSE PRACTITIONER

## 2023-05-25 PROCEDURE — 97530 THERAPEUTIC ACTIVITIES: CPT

## 2023-05-25 PROCEDURE — 99232 SBSQ HOSP IP/OBS MODERATE 35: CPT

## 2023-05-25 RX ADMIN — TRAZODONE HYDROCHLORIDE 50 MG: 50 TABLET ORAL at 21:08

## 2023-05-25 RX ADMIN — ATORVASTATIN CALCIUM 40 MG: 40 TABLET, FILM COATED ORAL at 09:49

## 2023-05-25 RX ADMIN — HEPARIN SODIUM 5000 UNITS: 5000 INJECTION INTRAVENOUS; SUBCUTANEOUS at 14:44

## 2023-05-25 RX ADMIN — DONEPEZIL HYDROCHLORIDE 5 MG: 5 TABLET ORAL at 09:11

## 2023-05-25 RX ADMIN — POLYETHYLENE GLYCOL 3350 17 G: 17 POWDER, FOR SOLUTION ORAL at 09:11

## 2023-05-25 RX ADMIN — HEPARIN SODIUM 5000 UNITS: 5000 INJECTION INTRAVENOUS; SUBCUTANEOUS at 06:30

## 2023-05-25 RX ADMIN — ASPIRIN 81 MG: 81 TABLET, COATED ORAL at 09:11

## 2023-05-25 RX ADMIN — LOSARTAN POTASSIUM 50 MG: 50 TABLET, FILM COATED ORAL at 09:11

## 2023-05-25 RX ADMIN — TAMSULOSIN HYDROCHLORIDE 0.8 MG: 0.4 CAPSULE ORAL at 17:40

## 2023-05-25 RX ADMIN — SENNOSIDES, DOCUSATE SODIUM 2 TABLET: 8.6; 5 TABLET ORAL at 09:11

## 2023-05-25 RX ADMIN — HEPARIN SODIUM 5000 UNITS: 5000 INJECTION INTRAVENOUS; SUBCUTANEOUS at 21:08

## 2023-05-25 RX ADMIN — METOPROLOL SUCCINATE 100 MG: 100 TABLET, EXTENDED RELEASE ORAL at 09:11

## 2023-05-25 RX ADMIN — TICAGRELOR 90 MG: 90 TABLET ORAL at 09:49

## 2023-05-25 RX ADMIN — DONEPEZIL HYDROCHLORIDE 5 MG: 5 TABLET ORAL at 17:40

## 2023-05-25 RX ADMIN — SENNOSIDES, DOCUSATE SODIUM 2 TABLET: 8.6; 5 TABLET ORAL at 17:40

## 2023-05-25 RX ADMIN — PANTOPRAZOLE SODIUM 20 MG: 20 TABLET, DELAYED RELEASE ORAL at 06:30

## 2023-05-25 RX ADMIN — CITALOPRAM HYDROBROMIDE 20 MG: 20 TABLET ORAL at 09:11

## 2023-05-25 RX ADMIN — TICAGRELOR 90 MG: 90 TABLET ORAL at 21:08

## 2023-05-25 NOTE — PROGRESS NOTES
"   05/25/23 1230   Pain Assessment   Pain Assessment Tool 0-10   Pain Score No Pain   Comprehension   Comprehension (FIM) 4 - Needs words repeated   Expression   Expression (FIM) 2 - Uses only simple expressions or gestures (waves, hello)   Social Interaction   Social Interaction (FIM) 4 - Interacts 75-89% of time   Problem Solving   Problem solving (FIM) 2 - Solves basic problems 25-49% of time   Memory   Memory (FIM) 2 - Recognizes, recalls/performs 25-49%   Speech/Language/Cognition Assessmetn   Treatment Assessment Pt lying in bed upon SLP entering, agreeable to skilled ST session targeting language skills  Pt initially asked to pracrtice his expressive language for bio info  I'ly recalled his F & L name, but required multimodal (sentence completion, initial phoneme, phonetic placement;PP, semantic) cues to name his month and year of birth, address, and wife's name  Unable to formulate accurate language output for his date of birth ([birth year]th), or his current age (61), even where full modeling provided; paraphasias instead (24st, 47 y/o)  Next, pt completing picture naming from a provided picture scene  Of note, pt w/out his glasses, and indicating \"can't make it out,\" although performed accurately for most items in the picture  Able to independently name 6/11, and where sentence completion provided as a cue, 10/11; where sentence completion + initial phonemic cue provided, 11/11  Following, pt engaging in object naming for items in room  Pt able to i'ly name 14/17, and 16/17 where tactile cues allowed  Next, pt engaging in receptive language tasks in the form of reading comprehension  Again, pt exclaiming he can't quite see the photos, and that his wife will be bringing his glasses tonight; agreeable to try anyways  Pt able to identify which picture is being described in a F02 from given simple sentences  He was required to comprehend verbs and nouns as they were presented in written format   Performed at 1/3, and " "demonstrated understanding of 3/3 with head nods and \"ahhh yes\" when SLP underlined specific word that aids in comprehension and decision making in choosing the correct photo  To further assess reading comprehension where glasses are used in subsequent sessions; if not provided, SLP to gather larger photos and larger font sizes  Finally, pt completing a sentence completion task to practice his word finding with the help of semantic/associated cues (e g , tables and __________)  Able to complete 6/18 i'ly, and 11/18 where modA provided (alternate sentence, repetition of stimuli, initial phonemic cue)  Based on today's session, it is recommended the patient continue to receive skilled ST to further optimize his expressive and receptive language skills to increase independence in communication and to reduce caregiver/family burden upon D/C from the [facility]  SLP Therapy Minutes   SLP Time In 3976   SLP Time Out 1330   SLP Total Time (minutes) 60   SLP Mode of treatment - Individual (minutes) 60   SLP Mode of treatment - Concurrent (minutes) 0   SLP Mode of treatment - Group (minutes) 0   SLP Mode of treatment - Co-treat (minutes) 0   SLP Mode of Treatment - Total time(minutes) 60 minutes   SLP Cumulative Minutes 655   Therapy Time missed   Time missed?  No       "

## 2023-05-25 NOTE — PROGRESS NOTES
Pastoral Care Progress Note    2023  Patient: Rocael Garcai : 1960  Admission Date & Time: 2023 1435  MRN: [id number] [facility]: [id number]        Had brief conversation with Shmuel Padilla today to ask how it's going, how he's doing  He said he was doing well, making progress in therapy  Then seemed to blank on words, and I didn't want to push hard, so stopped conversation with encouragement to keep up the good work  Spiritual care remains available

## 2023-05-25 NOTE — PROGRESS NOTES
"   05/25/23 0818   Pain Assessment   Pain Assessment Tool 0-10   Pain Score No Pain   Restrictions/Precautions   Precautions Aphasia;Bed/chair alarms; Fall Risk;Cognitive;Supervision on toilet/commode; Cortez;Aspiration   Cognition   Overall Cognitive Status Impaired   Subjective   Subjective Ready for therapy, slept well   Lying to Sitting on Side of Bed   Type of Assistance Needed Physical assistance;Verbal cues   Physical Assistance Level 26%-50%   Comment given time, did not use bedrail, Assist at trunk to initiate sidelying to sit and get balance at EOB   Lying to Sitting on Side of Bed CARE Score 3   Sit to Stand   Type of Assistance Needed Physical assistance   Physical Assistance Level 26%-50%   Sit to Stand CARE Score 3   Bed-Chair Transfer   Type of Assistance Needed Physical assistance   Physical Assistance Level 26%-50%   Comment practiced repeated SPT left & right having pt remain flexed at the hips with B UE contact throughout   Chair/Bed-to-Chair Transfer CARE Score 3   Walk 10 Feet   Type of Assistance Needed Physical assistance   Physical Assistance Level Total assistance   Comment Two person A with chair follow   Walk 10 Feet CARE Score 1   Walk 50 Feet with Two Turns   Comment walked 80 ft straight path only   Reason if not Attempted Safety concerns   Walk 50 Feet with Two Turns CARE Score 88   Walk 150 Feet   Reason if not Attempted Safety concerns   Walk 150 Feet CARE Score 88   Ambulation   Primary Mode of Locomotion Prior to Admission Walk   Distance Walked (feet) 80 ft  (80 ft x 1 B HHA, 30 ft x 2 fwd/bkwd along HR)   Gait Pattern Ataxic; Improper weight shift   Limitations Noted In Posture; Endurance;Balance; Coordination;Strength;Midline Orientation   Findings Worked with 3# ankle wt on right  Right HR with L HHA fwd/Bkwd initially, then LEft HHA and guarding on right(right arm free swinging) down tejada  Improper wt shift, but with cueing to take \"BIG STEPS\" able to improve floor clearance     Does " the patient walk? 2  Yes   Therapeutic Interventions   Flexibility passive stretch B HS & calves   Neuromuscular Re-Education Worked on repeated transfers W/c<> Mat going left & right, focused on maintaining hip flexion with B UE contact throughout and cues to keep feet moving   Equipment Use   NuStep 10 min lvl 1 for neural priming at start of session, tolerated very well   Assessment   Treatment Assessment Pt participating well  Emphasis on transfers both directions  When pt keeps B UE support with flexed posture has improved balance and can complete transfers with one person A  If he were to stand upright he then has difficulty maintaining midline and balance  Appropriate for continued high intensity gait trainig with 2 person A  PT Therapy Minutes   PT Time In 0830   PT Time Out 0930   PT Total Time (minutes) 60   PT Mode of treatment - Individual (minutes) 60   PT Mode of treatment - Concurrent (minutes) 0   PT Mode of treatment - Group (minutes) 0   PT Mode of treatment - Co-treat (minutes) 0   PT Mode of Treatment - Total time(minutes) 60 minutes   PT Cumulative Minutes 943   Therapy Time missed   Time missed?  No

## 2023-05-25 NOTE — PROGRESS NOTES
"   05/25/23 1015   Pain Assessment   Pain Assessment Tool Novak-Baker FACES   Novak-Baker FACES Pain Rating 8  (Pain relieved w/ standing trials to offload buttocks)   Pain Location/Orientation Location: Buttocks   Pain Onset/Description Descriptor: [facility] Pain Intervention(s) Repositioned; Ambulation/increased activity   Restrictions/Precautions   Precautions Aphasia; Aspiration;Bed/chair alarms;Cognitive; Fall Risk; [place];Pain;Supervision on toilet/commode   Lifestyle   Autonomy \"I definitely will not have to go\" pt states when asked about completing commode transfer trial   Sit to Stand   Type of Assistance Needed Physical assistance   Physical Assistance Level 26%-50%   Comment Valery x1 w/ support on R and cues for hand/foot placement   Sit to Stand CARE Score 3   Toilet Transfer   Type of Assistance Needed Physical assistance   Physical Assistance Level Total assistance   Comment Trialed sit pivot DA platform BSC transfer follow discussion w/ PT Ed who reported success w/ sit pivots where pt has hands on both surfaces  Pt requries cues for hand placement and technique, d/t aphasia not always able to follow commands correctly  Set transfer up so that sit pivot would be completed to R to [facility] from tilt in space, and then again towards R to EOB  Pt w/ firm R hand  on BSC, initially causing commode to tip  After providing stabilzing assist to commode pt completes but was a mod-maxA x2 transfer d/t aphasia and safety concerns related to equpiment shifting  W/ review of transfer if chair could have been placed closer to [facility], then pt may have been able to reach to commode hand rail easier and transfer could have been completed more IND  For sit pivot from [facility] to EOB, extensive assist and cueing for hand placement, pt completes this transfer easier at Min-modAx 2  At this time continue to rec swap out method w/ nursing staff, OT to continue to trial sit pivot to [facility] in upcoming sessions     Toilet Transfer CARE " Score 1   Light Housekeeping   Light Housekeeping To challenge R sided attention, Bimanual coordination pt completes laundry folding task while seated at table top  Pt folded his own Tshirt, shorts, pants and matched socks  Pt able to complete technique he prefers at home to roll  clothing vs fold  Coordination deficits noted t/o task, initially pt limited by dec R visual field as he neglected the R side of the item, but he was able to self correct w/o cues when provided time  Increased difficulty w/ matching socks, but again, was able to complete w/ extra time  Pt smiled w/ task completion, appeared to experience intrinsic gratification by completing laundry task  Functional Standing Tolerance   Comments Pt reporting discomfort in buttocks while seated  Pt demo's stance w/ Valery for [id number] engaging w/ conversation w/ various therapists in the room regarding leisure pursuits including the [employer]  Pt demos aphasia, benefits from gentle cueing when having trouble w/ word finding  Neuromuscular Education   Comments Pt engaged in fxnl reach to target and item transport activity while seated at table top  Completed seated to inc focus on on comprehension and UB motor planning, as well as skills such as visual scanning  First pt to transport stacked cones from L side target to target on R side  Noted dec motor planning and coordination, but able to complete w/ verbal cues ranging from only intial cues to mod cues per rep  Pt then completes FM object sorting task placing red, yellow or blue bears into like color cup placed on R side  Pt w/ min-max difficulty requiring varying levels of verbal and/or tactile cues  The further that targets are placed on R the side, the harder for pt to locate, and then when he does locate, the harder it is for him to motor plan placing into the target  Pt reporting cog fatigue follow task  Improved w/ rest break     Cognition   Overall Cognitive Status Impaired Arousal/Participation Cooperative   Attention Attends with cues to redirect   Orientation Level Oriented to person   Memory Decreased short term memory;Decreased recall of recent events   Following Commands Follows one step commands inconsistently   Activity Tolerance   Activity Tolerance Patient tolerated treatment well   Assessment   Treatment Assessment OT session focusing on toilet transfer technique trial, Fxnl cog and visual scanning skils, fxnl standing tolerance  Pt agreeable to session and remains motivated  Continues to be most limited by aphasia and dec motor planning as well as coordination  Pt continues to require skilled hands on assist in order to maintain his safety  OT to continue to follow POC  Prognosis Fair   Problem List Decreased strength;Decreased range of motion;Decreased endurance; Impaired balance;Decreased mobility; Decreased coordination;Decreased cognition; Impaired judgement;Decreased safety awareness; Impaired vision; Impaired sensation;Pain   Plan   Treatment/Interventions ADL retraining;Functional transfer training; Therapeutic exercise; Endurance training;Cognitive reorientation;Patient/family training;Equipment eval/education; Compensatory technique education;Continued evaluation   Progress Progressing toward goals   OT Therapy Minutes   OT Time In 1015   OT Time Out 1115   OT Total Time (minutes) 60   OT Mode of treatment - Individual (minutes) 60   OT Mode of treatment - Concurrent (minutes) 0   OT Mode of treatment - Group (minutes) 0   OT Mode of treatment - Co-treat (minutes) 0   OT Mode of Treatment - Total time(minutes) 60 minutes   OT Cumulative Minutes 925   Therapy Time missed   Time missed?  No

## 2023-05-25 NOTE — PROGRESS NOTES
Internal Medicine Progress Note  Patient: Kary Kruger  Age/sex: 58 y o  male  Medical Record #: [id number]      ASSESSMENT/PLAN: (Interval History)  Kary Kruger is seen and examined and management for following issues:    Acute multifocal ischemic strokes  • Occurred in different arterial distributions  • IVY was negative for thrombus/PFO  • Felt not to be vasculitis and negative by a-gram 5/4/23  • Continue ASA/Brilinta/statin     Left ICA stenosis  • Was noted to have all of the CVAs in last month have been in left anterior circulation  • S/p PTA/stent 5/4/23  • Recheck carotid doppler 6 weeks and see NS  • Continue AP/statin     Right ICA stenosis  • To followup with Vasc surgery as OP  • Continue AP/statin     LOOP recorder  • Was placed 3/2019  • Neuro wants it to be replaced = for OP to Cardiology     Leukocytosis  • Had no fevers  • CXR and Procal were normal in the hospital  • Mild/stable     HTN  • Home:  Toprol 100mg qd/Norvasc 10mg qd/Losartan 50mg qd/HCTZ 25mg qd/Hydralazine 25mg TID  • Here:  Toprol 100mg qd/Losartan 50mg qd  • stable     Pre DM  • HbA1C 5 8  • Takes Metformin at home but currently not on it in hospital  • Accuchecks were stable and dc'd in the hospital  • Monitor FBS with BMPs and continue DM diet  • FBS was 90 on 5/22/23 and 88 today 5/25/23     Depression  • Continue Celexa as at home  • Recommend neuropsychology eval     Memory loss  • Continue Aricept as at home  • He has had memory issues since his CVA in 2019     Urine retention  • Has jo placed 5/8/23  • VT 5/18 failed and jo replaced by Urology 5/19/23 and is not to be removed  • Continue Flomax     Chronic LBP  • MRI showed advanced multilevel spondylosis, slightly progressed on the right at L2-3 compared to the prior study but otherwise stable  • Pain management per PMR     AAA  • Found on L-spine MRI  • Measures 3 4 cm  • OP followup     Hyponatremia  • Stopped HCTZ 5/16/23    • Resolved off of HCTZ and had been given IVF     Discharge date:  [facility]    The above assessment and plan was reviewed and updated as determined by my evaluation of the patient on 5/25/2023      Labs:   Results from last 7 days   Lab Units 05/25/23  0549 05/22/23  0615   HEMATOCRIT % 35 1* 36 3*   HEMOGLOBIN g/dL 12 0 12 1   PLATELETS Thousands/uL 288 294   WBC Thousand/uL 11 93* 12 96*     Results from last 7 days   Lab Units 05/25/23  0549 05/22/23  0615   BUN mg/dL 23 16   CALCIUM mg/dL 9 0 9 1   CHLORIDE mmol/L 109* 110*   CO2 mmol/L 25 25   CREATININE mg/dL 1 02 1 03   POTASSIUM mmol/L 3 7 4 0   SODIUM mmol/L 136 137             Results from last 7 days   Lab Units 05/19/23  1045   POC GLUCOSE mg/dl 177*       Review of Scheduled Meds:  Current Facility-Administered Medications   Medication Dose Route Frequency Provider Last Rate   • acetaminophen  975 mg Oral TID PRN Monserrat Topete MD     • aspirin  81 mg Oral Daily Zack Michelle, DO     • atorvastatin  40 mg Oral Daily Zack Michelle, DO     • bisacodyl  10 mg Rectal Daily PRN Zack Michelle, DO     • calcium carbonate  1,000 mg Oral Daily PRN Zack Michelle, DO     • citalopram  20 mg Oral Daily Zack Michelle, DO     • donepezil  5 mg Oral BID Zack Michelle, DO     • heparin (porcine)  5,000 Units Subcutaneous Q8H [address] Zack Michelle, DO     • lidocaine  1 patch Topical Daily Zack Michelle, DO     • lidocaine   Topical Q4H PRN Monserrat Topete MD     • losartan  50 mg Oral Daily ERIC Berkowitz     • methocarbamol  500 mg Oral Q6H PRN Zack Michelle, DO     • metoprolol succinate  100 mg Oral Daily Zack Michelle, DO     • ondansetron  4 mg Oral Q6H PRN Zack Michelle, DO     • oxyCODONE  5 mg Oral Q6H PRN Zack Michelle, DO     • oxyCODONE  2 5 mg Oral Q6H PRN Zack Michelle, DO     • pantoprazole  20 mg Oral Early Morning Zack Michelle, DO     • polyethylene glycol  17 g Oral QAM Zack Michelle, DO     • senna  2 tablet Oral Daily PRN Zack Martinez DO     • senna-brookete sodium  2 tablet Oral BID Marlaine Thurston, DO     • tamsulosin  0 8 mg Oral Daily With [address], CRNP     • ticagrelor  90 mg Oral Q12H [address] Marlaine Naeem, DO     • traZODone  50 mg Oral HS Regino Kennedy MD         Subjective/ HPI: Patient seen and examined  Patients overnight issues or events were reviewed with nursing or staff during rounds or morning huddle session  New or overnight issues include the following:     No new or overnight issues  Offers no complaints    ROS:   A 10 point ROS was performed; negative except as noted above  Imaging:     No orders to display       *Labs /Radiology studies reviewed  *Medications reviewed and reconciled as needed  *Please refer to order section for additional ordered labs studies  *Case discussed with primary attending during morning huddle case rounds    Physical Examination:  Vitals:   Vitals:    05/24/23 1340 05/24/23 2100 05/25/23 0551 05/25/23 0900   BP: 138/62 152/73 156/67 144/82   BP Location: Right arm Right arm Right arm Right arm   Pulse: 89 81 78 84   Resp: 18 18 18 16   Temp: 98 3 °F (36 8 °C) 98 2 °F (36 8 °C) 98 2 °F (36 8 °C)    TempSrc: Oral Oral Oral    SpO2: 97% 97% 95%    Weight:           General Appearance: no distress, conversive  HEENT: PERRLA, conjuctiva normal; oropharynx clear; mucous membranes moist   Neck: Supple, normal ROM   Lungs: CTA, normal respiratory effort, no retractions, expiratory effort normal  CV: regular rate and rhythm; no rubs/murmurs/gallops, PMI normal   ABD: soft; ND/NT; +BS  EXT: no edema  Skin: normal turgor, normal texture, no rashes  Psych: affect normal, mood normal  Neuro: AA      The above physical exam was reviewed and updated as determined by my evaluation of the patient on 5/25/2023      Invasive Devices     Drain  Duration           Urethral Catheter Coude 16 Fr  5 days                   VTE Pharmacologic Prophylaxis: Heparin  Code Status: Level 1 - Full Code  Current Length of Stay: 13 day(s)      Total time spent:  30 minutes with more than 50% spent counseling/coordinating care  Counseling includes discussion with patient re: progress  and discussion with patient of his/her current medical state/information  Coordination of patient's care was performed in conjunction with primary service  Time invested included review of patient's labs, vitals, and management of their comorbidities with continued monitoring  In addition, this patient was discussed with medical team including physician and advanced extenders  The care of the patient was extensively discussed and appropriate treatment plan was formulated unique for this patient  Medical decision making for the day was made by supervising physician unless otherwise noted in their attestation statement  ** Please Note:  voice to text software may have been used in the creation of this document   Although proof errors in transcription or interpretation are a potential of such software**

## 2023-05-26 PROCEDURE — 97530 THERAPEUTIC ACTIVITIES: CPT

## 2023-05-26 PROCEDURE — 99232 SBSQ HOSP IP/OBS MODERATE 35: CPT | Performed by: INTERNAL MEDICINE

## 2023-05-26 PROCEDURE — 99232 SBSQ HOSP IP/OBS MODERATE 35: CPT

## 2023-05-26 PROCEDURE — 92507 TX SP LANG VOICE COMM INDIV: CPT

## 2023-05-26 PROCEDURE — 97112 NEUROMUSCULAR REEDUCATION: CPT

## 2023-05-26 PROCEDURE — 97535 SELF CARE MNGMENT TRAINING: CPT

## 2023-05-26 RX ADMIN — DONEPEZIL HYDROCHLORIDE 5 MG: 5 TABLET ORAL at 17:00

## 2023-05-26 RX ADMIN — TICAGRELOR 90 MG: 90 TABLET ORAL at 09:56

## 2023-05-26 RX ADMIN — LOSARTAN POTASSIUM 50 MG: 50 TABLET, FILM COATED ORAL at 09:54

## 2023-05-26 RX ADMIN — HEPARIN SODIUM 5000 UNITS: 5000 INJECTION INTRAVENOUS; SUBCUTANEOUS at 22:13

## 2023-05-26 RX ADMIN — TRAZODONE HYDROCHLORIDE 50 MG: 50 TABLET ORAL at 22:13

## 2023-05-26 RX ADMIN — HEPARIN SODIUM 5000 UNITS: 5000 INJECTION INTRAVENOUS; SUBCUTANEOUS at 15:22

## 2023-05-26 RX ADMIN — SENNOSIDES, DOCUSATE SODIUM 2 TABLET: 8.6; 5 TABLET ORAL at 09:53

## 2023-05-26 RX ADMIN — ATORVASTATIN CALCIUM 40 MG: 40 TABLET, FILM COATED ORAL at 09:53

## 2023-05-26 RX ADMIN — TAMSULOSIN HYDROCHLORIDE 0.8 MG: 0.4 CAPSULE ORAL at 17:00

## 2023-05-26 RX ADMIN — METHOCARBAMOL 500 MG: 500 TABLET ORAL at 03:43

## 2023-05-26 RX ADMIN — TICAGRELOR 90 MG: 90 TABLET ORAL at 22:13

## 2023-05-26 RX ADMIN — PANTOPRAZOLE SODIUM 20 MG: 20 TABLET, DELAYED RELEASE ORAL at 05:29

## 2023-05-26 RX ADMIN — METHOCARBAMOL 500 MG: 500 TABLET ORAL at 22:13

## 2023-05-26 RX ADMIN — DONEPEZIL HYDROCHLORIDE 5 MG: 5 TABLET ORAL at 09:54

## 2023-05-26 RX ADMIN — CITALOPRAM HYDROBROMIDE 20 MG: 20 TABLET ORAL at 09:53

## 2023-05-26 RX ADMIN — LIDOCAINE 5% 1 PATCH: 700 PATCH TOPICAL at 09:54

## 2023-05-26 RX ADMIN — HEPARIN SODIUM 5000 UNITS: 5000 INJECTION INTRAVENOUS; SUBCUTANEOUS at 05:29

## 2023-05-26 RX ADMIN — METOPROLOL SUCCINATE 100 MG: 100 TABLET, EXTENDED RELEASE ORAL at 09:54

## 2023-05-26 RX ADMIN — ASPIRIN 81 MG: 81 TABLET, COATED ORAL at 09:53

## 2023-05-26 NOTE — PROGRESS NOTES
Internal Medicine Progress Note  Patient: Luci Sellers  Age/sex: 58 y o  male  Medical Record #: [id number]      ASSESSMENT/PLAN: (Interval History)  Luci Sellers is seen and examined and management for following issues:    Acute multifocal ischemic strokes  • Occurred in different arterial distributions  • IVY was negative for thrombus/PFO  • Felt not to be vasculitis and negative by a-gram 5/4/23  • Continue ASA/Brilinta/statin     Left ICA stenosis  • Was noted to have all of the CVAs in last month have been in left anterior circulation  • S/p PTA/stent 5/4/23  • Recheck carotid doppler 6 weeks and see NS  • Continue AP/statin     Right ICA stenosis  • To followup with Vasc surgery as OP  • Continue AP/statin     LOOP recorder  • Was placed 3/2019  • Neuro wants it to be replaced = for OP to Cardiology     Leukocytosis  • Had no fevers  • CXR and Procal were normal in the hospital  • Mild/improving     HTN  • Home:  Toprol 100mg qd/Norvasc 10mg qd/Losartan 50mg qd/HCTZ 25mg qd/Hydralazine 25mg TID  • Here:  Toprol 100mg qd/Losartan 50mg qd  • stable     Pre DM  • HbA1C 5 8  • Takes Metformin at home but currently not on it in hospital  • Accuchecks were stable and dc'd in the hospital  • Monitor FBS with BMPs and continue DM diet  • FBS was 90 on 5/22/23 and 88 on 5/25/23     Depression  • Continue Celexa as at home     Memory loss  • Continue Aricept as at home  • He has had memory issues since his CVA in 2019     Urine retention  • Has jo placed 5/8/23  • VT 5/18 failed and jo replaced by Urology 5/19/23 and is not to be removed  • Continue Flomax     Chronic LBP  • MRI showed advanced multilevel spondylosis, slightly progressed on the right at L2-3 compared to the prior study but otherwise stable  • Pain management per PMR     AAA  • Found on L-spine MRI  • Measures 3 4 cm  • OP followup     Hyponatremia  • Stopped HCTZ 5/16/23    • Resolved off of HCTZ and had been given IVF     Discharge date:  [place]       The above assessment and plan was reviewed and updated as determined by my evaluation of the patient on 5/26/2023      Labs:   Results from last 7 days   Lab Units 05/25/23  0549 05/22/23  0615   HEMATOCRIT % 35 1* 36 3*   HEMOGLOBIN g/dL 12 0 12 1   PLATELETS Thousands/uL 288 294   WBC Thousand/uL 11 93* 12 96*     Results from last 7 days   Lab Units 05/25/23  0549 05/22/23  0615   BUN mg/dL 23 16   CALCIUM mg/dL 9 0 9 1   CHLORIDE mmol/L 109* 110*   CO2 mmol/L 25 25   CREATININE mg/dL 1 02 1 03   POTASSIUM mmol/L 3 7 4 0   SODIUM mmol/L 136 137                   Review of Scheduled Meds:  Current Facility-Administered Medications   Medication Dose Route Frequency Provider Last Rate   • acetaminophen  975 mg Oral TID PRN Aman Verdugo MD     • aspirin  81 mg Oral Daily Martinez Rabago, DO     • atorvastatin  40 mg Oral Daily Martinez Rabago, DO     • bisacodyl  10 mg Rectal Daily PRN Martinez Rabago, DO     • calcium carbonate  1,000 mg Oral Daily PRN Martinez Rabago, DO     • citalopram  20 mg Oral Daily Martinez Rabago, DO     • donepezil  5 mg Oral BID Martinez Rabago, DO     • heparin (porcine)  5,000 Units Subcutaneous Q8H [address] Martinez Rabago, DO     • lidocaine  1 patch Topical Daily Martinez Rabago, DO     • lidocaine   Topical Q4H PRN Aman Verdugo MD     • losartan  50 mg Oral Daily ERIC Trotter     • methocarbamol  500 mg Oral Q6H PRN Martinez Rabago, DO     • metoprolol succinate  100 mg Oral Daily Martinez Rabago, DO     • ondansetron  4 mg Oral Q6H PRN Martinez Rabago, DO     • oxyCODONE  5 mg Oral Q6H PRN Martinez Rabago, DO     • oxyCODONE  2 5 mg Oral Q6H PRN Martinez Rabago, DO     • pantoprazole  20 mg Oral Early Morning Martinez Rabago, DO     • polyethylene glycol  17 g Oral QAM Martinez Rabago, DO     • senna  2 tablet Oral Daily PRN Martinez Rabago, DO     • senna-docusate sodium  2 tablet Oral BID Martinez Rabago, DO     • tamsulosin  0 8 mg Oral Daily With Dinner [address]e [employer], CRNP     • ticagrelor  90 mg Oral Q12H [facility] Chauncey Soto DO     • traZODone  50 mg Oral HS Dipti Hernandez MD         Subjective/ HPI: Patient seen and examined  Patients overnight issues or events were reviewed with nursing or staff during rounds or morning huddle session  New or overnight issues include the following:     No new or overnight issues  Offers no complaints    ROS:   A 10 point ROS was performed; negative except as noted above  Imaging:     No orders to display       *Labs /Radiology studies reviewed  *Medications reviewed and reconciled as needed  *Please refer to order section for additional ordered labs studies  *Case discussed with primary attending during morning huddle case rounds    Physical Examination:  Vitals:   Vitals:    05/25/23 1415 05/25/23 2100 05/26/23 0543 05/26/23 0954   BP: 142/72 145/68 139/66 142/64   BP Location: Right arm Right arm Right arm    Pulse: 72 72 78 78   Resp: 18 18 18    Temp: 98 8 °F (37 1 °C) 98 7 °F (37 1 °C) 98 9 °F (37 2 °C)    TempSrc: Oral Oral Oral    SpO2: 97% 96% 98%    Weight:           General Appearance: no distress, conversive  HEENT:  External ear normal   Nose normal w/o drainage  Mucous membranes are moist  Oropharynx is clear  Conjunctiva clear w/o icterus or redness  Neck:  Supple, normal ROM  Lungs: BBS without crackles/wheeze/rhonchi; respirations unlabored with normal inspiratory/expiratory effort  No retractions noted  On RA  CV: regular rate and rhythm; no rubs/murmurs/gallops, PMI normal   ABD: Abdomen is soft  Bowel sounds all quadrants  Nontender with no distention  EXT: no edema  Skin: normal turgor, normal texture, no rashes  Psych: affect normal, mood normal  Neuro: AAO    The above physical exam was reviewed and updated as determined by my evaluation of the patient on 5/26/2023      Invasive Devices     Drain  Duration           Urethral Catheter Coude 16 Fr  6 days                   VTE Pharmacologic Prophylaxis: Heparin  Code Status: Level 1 - Full Code  Current Length of Stay: 14 day(s)      Total time spent:  30 minutes with more than 50% spent counseling/coordinating care  Counseling includes discussion with patient re: progress  and discussion with patient of his/her current medical state/information  Coordination of patient's care was performed in conjunction with primary service  Time invested included review of patient's labs, vitals, and management of their comorbidities with continued monitoring  In addition, this patient was discussed with medical team including physician and advanced extenders  The care of the patient was extensively discussed and appropriate treatment plan was formulated unique for this patient  Medical decision making for the day was made by supervising physician unless otherwise noted in their attestation statement  ** Please Note:  voice to text software may have been used in the creation of this document   Although proof errors in transcription or interpretation are a potential of such software**

## 2023-05-26 NOTE — PROGRESS NOTES
05/26/23 0945   Pain Assessment   Pain Assessment Tool 0-10   Pain Score No Pain   Comprehension   Comprehension (FIM) 4 - Understands basic info/conversation 75-90% of time   Expression   Expression (FIM) 3 - Needs to repeat parts of sentences   Social Interaction   Social Interaction (FIM) 4 - Interacts 75-89% of time   Problem Solving   Problem solving (FIM) 3 - Solves basic problmes 50-74% of time   Memory   Memory (FIM) 3 - Recognizes, recalls/performs 50-74%   Speech/Language/Cognition Assessmetn   Treatment Assessment Pt lying in bed upon entering; spouse and sister in law in room  Agreeable to skilled ST session targeting language skills  Pt initially asked to practice his expressive language for bio info  I'ly recalled his F & L name, address, wife's name, son's name, and sister in [address] [place] [address] name  He required multimodal (sentence completion, initial phoneme, phonetic placement;PP, semantic) cues to name his month and date of birth, age, sister in [address] [place] [address] state of residence, dog's names, daughter's name, and grandchildren's names (melvin, kayla)  Unable to formulate accurate language output for his year of birth, even where full modeling provided  The aforementioned was completed by both SLP and family members after SLP modeled how to cue the pt and educated on aphasia, as well as specific strategies that the pt benefits from  Wife and MONIKA demonstrate good carryover  Next, pt asked to answer general information questions as it relates to occupations  He answered 2/10 i'ly and 8/10 where family cued with SLP assistance using multimodal cues  Next, pt asked to imitate short phrases  Able to successfully imitate 10/20, and where slower visual cues (model) as a second repetition and gestures provided, pt able to imitate 14/20  Family practiced additional fxl phrases with the pt to allow for SLP instruction on how to cue for assistance with speech motor planning   Following, pt provided with reading comprehension and picture ID task where he was given a picture, then three word choices to pick from that identifies the given picture  Pt able to accurately choose the word on 8/10 opps, and 9/10 where word options were read aloud, utilizing auditory cues  Next, pt asked to demonstrate print awareness as well as auditory comprehension for single words  SLP stated a word aloud, and pt had to choose from the two written options  Pt able to identify 4/5 correctly  Of note, this took significant time for pt to choose; unknown if this is due to R inattention or his aphasia  Recommend to discern in subsequent sessions  SLP discussed with family use of [employer] and other associated apps to aid in language recovery upon d/c from [facility]; recommended apraxia and comprehension apps  Family expressing gratitude for suggestions  Based on today's session, it is recommended the pt continue to receive skilled ST as he is continuing to demonstrate benefit from it and to further optimize his language skills to reduce caregiver and family burden upon D/C  SLP Therapy Minutes   SLP Time In 46   SLP Time Out 2876   SLP Total Time (minutes) 60   SLP Mode of treatment - Individual (minutes) 60   SLP Mode of treatment - Concurrent (minutes) 0   SLP Mode of treatment - Group (minutes) 0   SLP Mode of treatment - Co-treat (minutes) 0   SLP Mode of Treatment - Total time(minutes) 60 minutes   SLP Cumulative Minutes 715   Therapy Time missed   Time missed?  No

## 2023-05-26 NOTE — PLAN OF CARE
Problem: PAIN - ADULT  Goal: Verbalizes/displays adequate comfort level or baseline comfort level  Description: Interventions:  - Encourage patient to monitor pain and request assistance  - Assess pain using appropriate pain scale  - Administer analgesics based on type and severity of pain and evaluate response  - Implement non-pharmacological measures as appropriate and evaluate response  - Consider cultural and social influences on pain and pain management  - Notify physician/advanced practitioner if interventions unsuccessful or patient reports new pain  Outcome: Progressing     Problem: INFECTION - ADULT  Goal: Absence or prevention of progression during hospitalization  Description: INTERVENTIONS:  - Assess and monitor for signs and symptoms of infection  - Monitor lab/diagnostic results  - Monitor all insertion sites, i e  indwelling lines, tubes, and drains  - Monitor endotracheal if appropriate and nasal secretions for changes in amount and color  - [place] appropriate cooling/warming therapies per order  - Administer medications as ordered  - Instruct and encourage patient and family to use good hand hygiene technique  - Identify and instruct in appropriate isolation precautions for identified infection/condition  Outcome: Progressing  Goal: Absence of fever/infection during neutropenic period  Description: INTERVENTIONS:  - Monitor WBC    Outcome: Progressing     Problem: SAFETY ADULT  Goal: Patient will remain free of falls  Description: INTERVENTIONS:  - Educate patient/family on patient safety including physical limitations  - Instruct patient to call for assistance with activity   - Consult OT/PT to assist with strengthening/mobility   - Keep Call bell within reach  - Keep bed low and locked with side rails adjusted as appropriate  - Keep care items and personal belongings within reach  - Initiate and maintain comfort rounds  - Make Fall Risk Sign visible to staff  - Offer Toileting every 2 Hours, in advance of need  - Initiate/Maintain bed/ chair alarm  - Obtain necessary fall risk management equipment: nonskid/footwear  - Apply yellow socks and bracelet for high fall risk patients  - Consider moving patient to room near nurses station  Outcome: Progressing  Goal: Maintain or return to baseline ADL function  Description: INTERVENTIONS:  -  Assess patient's ability to carry out ADLs; assess patient's baseline for ADL function and identify physical deficits which impact ability to perform ADLs (bathing, care of mouth/teeth, toileting, grooming, dressing, etc )  - Assess/evaluate cause of self-care deficits   - Assess range of motion  - Assess patient's mobility; develop plan if impaired  - Assess patient's need for assistive devices and provide as appropriate  - Encourage maximum independence but intervene and supervise when necessary  - Involve family in performance of ADLs  - Assess for home care needs following discharge   - Consider OT consult to assist with ADL evaluation and planning for discharge  - Provide patient education as appropriate  Outcome: Progressing  Goal: Maintains/Returns to pre admission functional level  Description: INTERVENTIONS:  - Perform BMAT or MOVE assessment daily    - Set and communicate daily mobility goal to care team and patient/family/caregiver  - Collaborate with rehabilitation services on mobility goals if consulted  - Perform Range of Motion 3 times a day  - Reposition patient every 2 hours    - Dangle patient 3 times a day  - Stand patient 3 times a day  - Ambulate patient 3 times a day  - Out of bed to chair 3 times a day   - Out of bed for meals 3 times a day  - Out of bed for toileting  - Record patient progress and toleration of activity level   Outcome: Progressing     Problem: DISCHARGE PLANNING  Goal: Discharge to home or other facility with appropriate resources  Description: INTERVENTIONS:  - Identify barriers to discharge w/patient and caregiver  - Arrange for needed discharge resources and transportation as appropriate  - Identify discharge learning needs (meds, wound care, etc )  - Arrange for interpretive services to assist at discharge as needed  - Refer to Case Management Department for coordinating discharge planning if the patient needs post-hospital services based on physician/advanced practitioner order or complex needs related to functional status, cognitive ability, or social support system  Outcome: Progressing     Problem: Prexisting or High Potential for Compromised Skin Integrity  Goal: Skin integrity is maintained or improved  Description: INTERVENTIONS:  - Identify patients at risk for skin breakdown  - Assess and monitor skin integrity  - Assess and monitor nutrition and hydration status  - Monitor labs   - Assess for incontinence   - Turn and reposition patient  - Assist with mobility/ambulation  - Relieve pressure over bony prominences  - Avoid friction and shearing  - Provide appropriate hygiene as needed including keeping skin clean and dry  - Evaluate need for skin moisturizer/barrier cream  - Collaborate with interdisciplinary team   - Patient/family teaching  - Consider wound care consult   Outcome: Progressing     Problem: MOBILITY - ADULT  Goal: Maintain or return to baseline ADL function  Description: INTERVENTIONS:  -  Assess patient's ability to carry out ADLs; assess patient's baseline for ADL function and identify physical deficits which impact ability to perform ADLs (bathing, care of mouth/teeth, toileting, grooming, dressing, etc )  - Assess/evaluate cause of self-care deficits   - Assess range of motion  - Assess patient's mobility; develop plan if impaired  - Assess patient's need for assistive devices and provide as appropriate  - Encourage maximum independence but intervene and supervise when necessary  - Involve family in performance of ADLs  - Assess for home care needs following discharge   - Consider OT consult to assist with ADL evaluation and planning for discharge  - Provide patient education as appropriate  Outcome: Progressing  Goal: Maintains/Returns to pre admission functional level  Description: INTERVENTIONS:  - Perform BMAT or MOVE assessment daily    - Set and communicate daily mobility goal to care team and patient/family/caregiver  - Collaborate with rehabilitation services on mobility goals if consulted  - Perform Range of Motion 3 times a day  - Reposition patient every 2 hours  - Dangle patient 3 times a day  - Stand patient 3 times a day  - Ambulate patient 3 times a day  - Out of bed to chair 3 times a day   - Out of bed for meals 3 times a day  - Out of bed for toileting  - Record patient progress and toleration of activity level   Outcome: Progressing     Problem: Nutrition/Hydration-ADULT  Goal: Nutrient/Hydration intake appropriate for improving, restoring or maintaining nutritional needs  Description: Monitor and assess patient's nutrition/hydration status for malnutrition  Collaborate with interdisciplinary team and initiate plan and interventions as ordered  Monitor patient's weight and dietary intake as ordered or per policy  Utilize nutrition screening tool and intervene as necessary  Determine patient's food preferences and provide high-protein, high-caloric foods as appropriate       INTERVENTIONS:  - Monitor oral intake, urinary output, labs, and treatment plans  - Assess nutrition and hydration status and recommend course of action  - Evaluate amount of meals eaten  - Assist patient with eating if necessary   - Allow adequate time for meals  - Recommend/ encourage appropriate diets, oral nutritional supplements, and vitamin/mineral supplements  - Order, calculate, and assess calorie counts as needed  - Recommend, monitor, and adjust tube feedings and TPN/PPN based on assessed needs  - Assess need for intravenous fluids  - Provide specific nutrition/hydration education as appropriate  - Include patient/family/caregiver in decisions related to nutrition  Outcome: Progressing

## 2023-05-26 NOTE — PROGRESS NOTES
05/26/23 1230   Pain Assessment   Pain Assessment Tool 0-10   Pain Score No Pain   Restrictions/Precautions   Precautions Aphasia; Aspiration;Bed/chair alarms;Cognitive; Fall Risk;Supervision on toilet/commode  (R side inattention)   Cognition   Overall Cognitive Status Impaired   Subjective   Subjective no complaints; pt ready to go   Sit to Lying   Type of Assistance Needed Physical assistance   Physical Assistance Level 25% or less   Sit to Lying CARE Score 3   Lying to Sitting on Side of Bed   Type of Assistance Needed Physical assistance   Physical Assistance Level 25% or less   Lying to Sitting on Side of Bed CARE Score 3   Sit to Stand   Type of Assistance Needed Physical assistance   Physical Assistance Level 25% or less   Sit to Stand CARE Score 3   Bed-Chair Transfer   Type of Assistance Needed Physical assistance   Physical Assistance Level 26%-50%   Comment contact with both hands at all times; SPT   Chair/Bed-to-Chair Transfer CARE Score 3   Car Transfer   Reason if not Attempted Safety concerns   Car Transfer CARE Score 88   Walk 10 Feet   Type of Assistance Needed Physical assistance   Physical Assistance Level Total assistance   Comment maxA x2 HHA with CF   Walk 10 Feet CARE Score 1   Walk 50 Feet with Two Turns   Type of Assistance Needed Physical assistance   Physical Assistance Level Total assistance   Comment maxA x2 HHA with CF   Walk 50 Feet with Two Turns CARE Score 1   Walk 150 Feet   Type of Assistance Needed Physical assistance   Physical Assistance Level Total assistance   Comment maxA x2 HHA with CF   Walk 150 Feet CARE Score 1   Walking 10 Feet on Uneven Surfaces   Reason if not Attempted Safety concerns   Walking 10 Feet on Uneven Surfaces CARE Score 88   Ambulation   Distance Walked (feet) 200 ft  (x3)   Assist Device Hand Hold  (x2)   Gait Pattern Ataxic; Inconsistant Deepti;Decreased foot clearance;Narrow NEREIDA; Improper weight shift;Decreased L stance   Limitations Noted In Balance; Coordination; Endurance; Heel Strike;Speed;Strength   Provided Assistance with: Balance;Direction;Weight Shift   Findings HHA B/L  less A on R, A from therapist to help with wt shift  3# wt on RLE for sensory feedback and coordination  Therapeutic Interventions   Neuromuscular Re-Education amb along rail fwd/bwd 30'x2 each direction   Equipment Use   NuStep 10mins L2 start of session for neural priming   Assessment   Treatment Assessment pt cont to work on gait training and neuro re-ed  pt with R innattention and impaired coordination  trialed mirror but unable to focus with that   pt still demonstrates apraxia when given instruction  pt cont to need practice with txs to and from w/c to improve carryover of hand and foot placement  cont to work on amb as well to maximize functional ind  Problem List Decreased strength;Decreased range of motion;Decreased endurance; Impaired balance;Decreased mobility; Decreased coordination;Decreased cognition; Impaired judgement;Decreased safety awareness; Impaired vision; Impaired sensation;Pain   Barriers to Discharge Inaccessible home environment;Decreased caregiver support   PT Barriers   Functional Limitation Car transfers;Stair negotiation;Standing;Transfers; Walking; Wheelchair management   Plan   Progress Progressing toward goals   Recommendation   PT Discharge Recommendation Home with outpatient rehabilitation   Equipment Recommended Wheelchair   PT Therapy Minutes   PT Time In 1230   PT Time Out 1330   PT Total Time (minutes) 60   PT Mode of treatment - Individual (minutes) 60   PT Mode of treatment - Concurrent (minutes) 0   PT Mode of treatment - Group (minutes) 0   PT Mode of treatment - Co-treat (minutes) 0   PT Mode of Treatment - Total time(minutes) 60 minutes   PT Cumulative Minutes 1003   Therapy Time missed   Time missed?  No

## 2023-05-26 NOTE — CASE MANAGEMENT
alhtough no determination has been received from  [facility], clinical update was faxed via epic to [phone]   Requested additional 7 dyas

## 2023-05-26 NOTE — PROGRESS NOTES
"OT Daily Treatment Note       05/26/23 0700   Pain Assessment   Pain Assessment Tool 0-10   Pain Score No Pain   Restrictions/Precautions   Precautions Aphasia; Aspiration;Bed/chair alarms;Cognitive; Fall Risk; [place]; Impulsive;Supervision on toilet/commode;Visual deficit   Lifestyle   Autonomy \"i feel like a new man\"   Eating   Type of Assistance Needed Physical assistance   Physical Assistance Level 25% or less   Comment pt req min A for self-feeding tasks with [place] assist to fork omlet  pt then able to bring food to mouth  pt able to eat bagel without assistance  full setup on tray required  Eating CARE Score 3   Oral Hygiene   Type of Assistance Needed Set-up / clean-up   Physical Assistance Level No physical assistance   Comment seated in WC at sink   Oral Hygiene CARE Score 5   Grooming   Findings TA for shaving of face and brushing through matted hair while sitting in WC   Shower/Bathe Self   Type of Assistance Needed Physical assistance   Physical Assistance Level 26%-50%   Comment pt completed full shower seated  pt required min/mod A for distal LE and bathing of buttock  pt able to stand with CGA by pulling up from grab bar and completed posterior bathing in stance  max vc for bathing all parts of UB, upper LE and groin  Shower/Bathe Self CARE Score 3   Bathing   Assessed Bath Style Shower   Tub/Shower Transfer   Findings A x 1 swap out method from tilt-in space to tub transfer bench   Upper Body Dressing   Type of Assistance Needed Physical assistance   Physical Assistance Level 25% or less   Comment min A to pull down back   Upper Body Dressing CARE Score 3   Lower Body Dressing   Type of Assistance Needed Physical assistance   Physical Assistance Level 76% or more   Comment A x 1 to thread over BLE and able to pull up from bedrail with CGA and maintain stance while OT assists with donning over hips  pt did then attempt to don L hip but due to dec balance, pt unable to complete thoroughly     Lower Body " Dressing CARE Score 2   Putting On/Taking Off Footwear   Type of Assistance Needed Physical assistance   Physical Assistance Level 51%-75%   Comment min A to doff socks and shoes, TA to don socks and shoes   Putting On/Taking Off Footwear CARE Score 2   Sit to Lying   Type of Assistance Needed Physical assistance   Physical Assistance Level 25% or less   Comment min A for RLE mgmt   Sit to Lying CARE Score 3   Lying to Sitting on Side of Bed   Type of Assistance Needed Physical assistance   Physical Assistance Level 25% or less   Comment min A for RLE mgmt   Lying to Sitting on Side of Bed CARE Score 3   Sit to Stand   Type of Assistance Needed Incidental touching   Physical Assistance Level No physical assistance   Comment CGA to pull up from bed rail and grab bars   Sit to Stand CARE Score 4   Bed-Chair Transfer   Type of Assistance Needed Physical assistance   Physical Assistance Level 26%-50%   Comment min A SPT no AD with 2nd person for safety only but not required   Chair/Bed-to-Chair Transfer CARE Score 3   Cognition   Overall Cognitive Status Impaired   Arousal/Participation Cooperative   Attention Attends with cues to redirect   Orientation Level Oriented to person   Memory Decreased short term memory;Decreased recall of recent events   Following Commands Follows one step commands inconsistently   Activity Tolerance   Activity Tolerance Patient tolerated treatment well   Assessment   Treatment Assessment Pt participated in skilled OT session with focus on ADL retraining, functional transfer training, compensatory technique education and continued education  See flowsheet for details of session and current functional status   Pt is limited by weakness, decreased ROM, impaired balance, decreased endurance, increased fall risk, decreased ADLS, decreased activity tolerance, decreased safety awareness, impaired judgement, decreased cognition, decreased strength and visual deficits and requires skilled OT services to increase independence and safety with ADL completion in prep for DC SNF  Plan to continue ADL retraining, functional transfer training, UE strengthening/ROM, endurance training, cognitive reorientation, Pt/caregiver education, neuro re-ed, fine motor coordination activities, compensatory technique education, continued education, energy conservation and activity engagement  to address barriers mentioned above  Prognosis Fair   Problem List Decreased strength;Decreased range of motion;Decreased endurance; Impaired balance;Decreased mobility; Decreased coordination;Decreased cognition; Impaired judgement;Decreased safety awareness; Impaired vision; Impaired sensation;Pain   Barriers to Discharge Inaccessible home environment;Decreased caregiver support   Plan   Treatment/Interventions ADL retraining;Functional transfer training; Therapeutic exercise; Endurance training;Patient/family training; Compensatory technique education   Progress Progressing toward goals   OT Therapy Minutes   OT Time In 0700   OT Time Out 0830   OT Total Time (minutes) 90   OT Mode of treatment - Individual (minutes) 90   OT Mode of treatment - Concurrent (minutes) 0   OT Mode of treatment - Group (minutes) 0   OT Mode of treatment - Co-treat (minutes) 0   OT Mode of Treatment - Total time(minutes) 90 minutes   OT Cumulative Minutes 1015   Therapy Time missed   Time missed?  No

## 2023-05-27 PROCEDURE — 97530 THERAPEUTIC ACTIVITIES: CPT

## 2023-05-27 PROCEDURE — 97112 NEUROMUSCULAR REEDUCATION: CPT

## 2023-05-27 PROCEDURE — 92507 TX SP LANG VOICE COMM INDIV: CPT

## 2023-05-27 PROCEDURE — 99231 SBSQ HOSP IP/OBS SF/LOW 25: CPT | Performed by: PHYSICAL MEDICINE & REHABILITATION

## 2023-05-27 PROCEDURE — 99232 SBSQ HOSP IP/OBS MODERATE 35: CPT | Performed by: INTERNAL MEDICINE

## 2023-05-27 RX ORDER — MUSCLE RUB CREAM 100; 150 MG/G; MG/G
CREAM TOPICAL 4 TIMES DAILY PRN
Status: DISCONTINUED | OUTPATIENT
Start: 2023-05-27 | End: 2023-06-06 | Stop reason: HOSPADM

## 2023-05-27 RX ADMIN — TICAGRELOR 90 MG: 90 TABLET ORAL at 10:02

## 2023-05-27 RX ADMIN — ASPIRIN 81 MG: 81 TABLET, COATED ORAL at 09:58

## 2023-05-27 RX ADMIN — CITALOPRAM HYDROBROMIDE 20 MG: 20 TABLET ORAL at 09:57

## 2023-05-27 RX ADMIN — TAMSULOSIN HYDROCHLORIDE 0.8 MG: 0.4 CAPSULE ORAL at 17:13

## 2023-05-27 RX ADMIN — DONEPEZIL HYDROCHLORIDE 5 MG: 5 TABLET ORAL at 09:57

## 2023-05-27 RX ADMIN — LIDOCAINE 5% 1 PATCH: 700 PATCH TOPICAL at 10:01

## 2023-05-27 RX ADMIN — HEPARIN SODIUM 5000 UNITS: 5000 INJECTION INTRAVENOUS; SUBCUTANEOUS at 05:50

## 2023-05-27 RX ADMIN — HEPARIN SODIUM 5000 UNITS: 5000 INJECTION INTRAVENOUS; SUBCUTANEOUS at 13:47

## 2023-05-27 RX ADMIN — HEPARIN SODIUM 5000 UNITS: 5000 INJECTION INTRAVENOUS; SUBCUTANEOUS at 21:57

## 2023-05-27 RX ADMIN — ATORVASTATIN CALCIUM 40 MG: 40 TABLET, FILM COATED ORAL at 09:57

## 2023-05-27 RX ADMIN — LOSARTAN POTASSIUM 50 MG: 50 TABLET, FILM COATED ORAL at 09:58

## 2023-05-27 RX ADMIN — DONEPEZIL HYDROCHLORIDE 5 MG: 5 TABLET ORAL at 17:13

## 2023-05-27 RX ADMIN — TRAZODONE HYDROCHLORIDE 50 MG: 50 TABLET ORAL at 21:57

## 2023-05-27 RX ADMIN — PANTOPRAZOLE SODIUM 20 MG: 20 TABLET, DELAYED RELEASE ORAL at 05:50

## 2023-05-27 RX ADMIN — METOPROLOL SUCCINATE 100 MG: 100 TABLET, EXTENDED RELEASE ORAL at 09:58

## 2023-05-27 RX ADMIN — TICAGRELOR 90 MG: 90 TABLET ORAL at 21:57

## 2023-05-27 NOTE — PLAN OF CARE
Problem: PAIN - ADULT  Goal: Verbalizes/displays adequate comfort level or baseline comfort level  Description: Interventions:  - Encourage patient to monitor pain and request assistance  - Assess pain using appropriate pain scale  - Administer analgesics based on type and severity of pain and evaluate response  - Implement non-pharmacological measures as appropriate and evaluate response  - Consider cultural and social influences on pain and pain management  - Notify physician/advanced practitioner if interventions unsuccessful or patient reports new pain  Outcome: Progressing     Problem: INFECTION - ADULT  Goal: Absence or prevention of progression during hospitalization  Description: INTERVENTIONS:  - Assess and monitor for signs and symptoms of infection  - Monitor lab/diagnostic results  - Monitor all insertion sites, i e  indwelling lines, tubes, and drains  - Monitor endotracheal if appropriate and nasal secretions for changes in amount and color  - [place] appropriate cooling/warming therapies per order  - Administer medications as ordered  - Instruct and encourage patient and family to use good hand hygiene technique  - Identify and instruct in appropriate isolation precautions for identified infection/condition  Outcome: Progressing  Goal: Absence of fever/infection during neutropenic period  Description: INTERVENTIONS:  - Monitor WBC    Outcome: Progressing     Problem: SAFETY ADULT  Goal: Patient will remain free of falls  Description: INTERVENTIONS:  - Educate patient/family on patient safety including physical limitations  - Instruct patient to call for assistance with activity   - Consult OT/PT to assist with strengthening/mobility   - Keep Call bell within reach  - Keep bed low and locked with side rails adjusted as appropriate  - Keep care items and personal belongings within reach  - Initiate and maintain comfort rounds  - Make Fall Risk Sign visible to staff  - Offer Kevin Linton, in advance of need  - Initiate/Main  - Obtain necessary fall risk man  - Apply yellow socks and bracelet for high fall risk patients  - Consider moving patient to room near nurses station  Outcome: Progressing  Goal: Maintain or return to baseline ADL function  Description: INTERVENTIONS:  -  Assess patient's ability to carry out ADLs; assess patient's baseline for ADL function and identify physical deficits which impact ability to perform ADLs (bathing, care of mouth/teeth, toileting, grooming, dressing, etc )  - Assess/evaluate cause of self-care deficits   - Assess range of motion  - Assess patient's mobility; develop plan if impaired  - Assess patient's need for assistive devices and provide as appropriate  - Encourage maximum independence but intervene and supervise when necessary  - Involve family in performance of ADLs  - Assess for home care needs following discharge   - Consider OT consult to assist with ADL evaluation and planning for discharge  - Provide patient education as appropriate  Outcome: Progressing  Goal: Maintains/Returns to pre admission functional level  Description: INTERVENTIONS:  - Perform BMAT or MOVE assessment daily    - Set and communicate daily mobility goal to care team and patient/family/caregiver  - Collaborate with rehabilitation services on mobility goals if consulted  - Perform Range ofrs    Meeta [place]es  - Stand pat  - Ambulate pa  - Out of bed to c  - Out of bed for  - Out of bed for toileting  - Record patient progress and toleration of activity level   Outcome: Progressing     Problem: DISCHARGE PLANNING  Goal: Discharge to home or other facility with appropriate resources  Description: INTERVENTIONS:  - Identify barriers to discharge w/patient and caregiver  - Arrange for needed discharge resources and transportation as appropriate  - Identify discharge learning needs (meds, wound care, etc )  - Arrange for interpretive services to assist at discharge as needed  - Refer to Case Management Department for coordinating discharge planning if the patient needs post-hospital services based on physician/advanced practitioner order or complex needs related to functional status, cognitive ability, or social support system  Outcome: Progressing     Problem: Prexisting or High Potential for Compromised Skin Integrity  Goal: Skin integrity is maintained or improved  Description: INTERVENTIONS:  - Identify patients at risk for skin breakdown  - Assess and monitor skin integrity  - Assess and monitor nutrition and hydration status  - Monitor labs   - Assess for incontinence   - Turn and reposition patient  - Assist with mobility/ambulation  - Relieve pressure over bony prominences  - Avoid friction and shearing  - Provide appropriate hygiene as needed including keeping skin clean and dry  - Evaluate need for skin moisturizer/barrier cream  - Collaborate with interdisciplinary team   - Patient/family teaching  - Consider wound care consult   Outcome: Progressing     Problem: MOBILITY - ADULT  Goal: Maintain or return to baseline ADL function  Description: INTERVENTIONS:  -  Assess patient's ability to carry out ADLs; assess patient's baseline for ADL function and identify physical deficits which impact ability to perform ADLs (bathing, care of mouth/teeth, toileting, grooming, dressing, etc )  - Assess/evaluate cause of self-care deficits   - Assess range of motion  - Assess patient's mobility; develop plan if impaired  - Assess patient's need for assistive devices and provide as appropriate  - Encourage maximum independence but intervene and supervise when necessary  - Involve family in performance of ADLs  - Assess for home care needs following discharge   - Consider OT consult to assist with ADL evaluation and planning for discharge  - Provide patient education as appropriate  Outcome: Progressing  Goal: Maintains/Returns to pre admission functional level  Description: INTERVENTIONS:  - Perform BMAT or MOVE assessment daily    - Set and communicate daily mobility goal to care team and patient/family/caregiver  - Collaborate with rehabilitation services on mobility goals if consulted  - Perform Range of s a day  - Reposition patis  - Dangle juana  - Stand patie  - Ambulate pat  - Out of bed to ch  - Out of bed  - Out of bed for toileting  - Record patient progress and toleration of activity level   Outcome: Progressing     Problem: Nutrition/Hydration-ADULT  Goal: Nutrient/Hydration intake appropriate for improving, restoring or maintaining nutritional needs  Description: Monitor and assess patient's nutrition/hydration status for malnutrition  Collaborate with interdisciplinary team and initiate plan and interventions as ordered  Monitor patient's weight and dietary intake as ordered or per policy  Utilize nutrition screening tool and intervene as necessary  Determine patient's food preferences and provide high-protein, high-caloric foods as appropriate       INTERVENTIONS:  - Monitor oral intake, urinary output, labs, and treatment plans  - Assess nutrition and hydration status and recommend course of action  - Evaluate amount of meals eaten  - Assist patient with eating if necessary   - Allow adequate time for meals  - Recommend/ encourage appropriate diets, oral nutritional supplements, and vitamin/mineral supplements  - Order, calculate, and assess calorie counts as needed  - Recommend, monitor, and adjust tube feedings and TPN/PPN based on assessed needs  - Assess need for intravenous fluids  - Provide specific nutrition/hydration education as appropriate  - Include patient/family/caregiver in decisions related to nutrition  Outcome: Progressing

## 2023-05-27 NOTE — PLAN OF CARE
Problem: PAIN - ADULT  Goal: Verbalizes/displays adequate comfort level or baseline comfort level  Description: Interventions:  - Encourage patient to monitor pain and request assistance  - Assess pain using appropriate pain scale  - Administer analgesics based on type and severity of pain and evaluate response  - Implement non-pharmacological measures as appropriate and evaluate response  - Consider cultural and social influences on pain and pain management  - Notify physician/advanced practitioner if interventions unsuccessful or patient reports new pain  Outcome: Progressing     Problem: INFECTION - ADULT  Goal: Absence or prevention of progression during hospitalization  Description: INTERVENTIONS:  - Assess and monitor for signs and symptoms of infection  - Monitor lab/diagnostic results  - Monitor all insertion sites, i e  indwelling lines, tubes, and drains  - Monitor endotracheal if appropriate and nasal secretions for changes in amount and color  - [place] appropriate cooling/warming therapies per order  - Administer medications as ordered  - Instruct and encourage patient and family to use good hand hygiene technique  - Identify and instruct in appropriate isolation precautions for identified infection/condition  Outcome: Progressing  Goal: Absence of fever/infection during neutropenic period  Description: INTERVENTIONS:  - Monitor WBC    Outcome: Progressing     Problem: SAFETY ADULT  Goal: Patient will remain free of falls  Description: INTERVENTIONS:  - Educate patient/family on patient safety including physical limitations  - Instruct patient to call for assistance with activity   - Consult OT/PT to assist with strengthening/mobility   - Keep Call bell within reach  - Keep bed low and locked with side rails adjusted as appropriate  - Keep care items and personal belongings within reach  - Initiate and maintain comfort rounds  - Make Fall Risk Sign visible to staff  - Offer Toiletin in advance of need  - Initiate/Main  - Obtain necessary fall risk management equi  - Apply yellow socks and bracelet for high fall risk patients  - Consider moving patient to room near nurses station  Outcome: Progressing  Goal: Maintain or return to baseline ADL function  Description: INTERVENTIONS:  -  Assess patient's ability to carry out ADLs; assess patient's baseline for ADL function and identify physical deficits which impact ability to perform ADLs (bathing, care of mouth/teeth, toileting, grooming, dressing, etc )  - Assess/evaluate cause of self-care deficits   - Assess range of motion  - Assess patient's mobility; develop plan if impaired  - Assess patient's need for assistive devices and provide as appropriate  - Encourage maximum independence but intervene and supervise when necessary  - Involve family in performance of ADLs  - Assess for home care needs following discharge   - Consider OT consult to assist with ADL evaluation and planning for discharge  - Provide patient education as appropriate  Outcome: Progressing  Goal: Maintains/Returns to pre admission functional level  Description: INTERVENTIONS:  - Perform BMAT or MOVE assessment daily    - Set and communicate daily mobility goal to care team and patient/family/caregiver  - Collaborate with rehabilitation services on mobility goals if consulted  - Perform Range of [place] day    - Reposition patient  - Dangle patient  - Stand juana  - Ambulate pat  - Out of bed to ch  - Out of bed for   - Out of bed for toileting  - Record patient progress and toleration of activity level   Outcome: Progressing     Problem: DISCHARGE PLANNING  Goal: Discharge to home or other facility with appropriate resources  Description: INTERVENTIONS:  - Identify barriers to discharge w/patient and caregiver  - Arrange for needed discharge resources and transportation as appropriate  - Identify discharge learning needs (meds, wound care, etc )  - Arrange for interpretive services to assist at discharge as needed  - Refer to Case Management Department for coordinating discharge planning if the patient needs post-hospital services based on physician/advanced practitioner order or complex needs related to functional status, cognitive ability, or social support system  Outcome: Progressing     Problem: Prexisting or High Potential for Compromised Skin Integrity  Goal: Skin integrity is maintained or improved  Description: INTERVENTIONS:  - Identify patients at risk for skin breakdown  - Assess and monitor skin integrity  - Assess and monitor nutrition and hydration status  - Monitor labs   - Assess for incontinence   - Turn and reposition patient  - Assist with mobility/ambulation  - Relieve pressure over bony prominences  - Avoid friction and shearing  - Provide appropriate hygiene as needed including keeping skin clean and dry  - Evaluate need for skin moisturizer/barrier cream  - Collaborate with interdisciplinary team   - Patient/family teaching  - Consider wound care consult   Outcome: Progressing     Problem: MOBILITY - ADULT  Goal: Maintain or return to baseline ADL function  Description: INTERVENTIONS:  -  Assess patient's ability to carry out ADLs; assess patient's baseline for ADL function and identify physical deficits which impact ability to perform ADLs (bathing, care of mouth/teeth, toileting, grooming, dressing, etc )  - Assess/evaluate cause of self-care deficits   - Assess range of motion  - Assess patient's mobility; develop plan if impaired  - Assess patient's need for assistive devices and provide as appropriate  - Encourage maximum independence but intervene and supervise when necessary  - Involve family in performance of ADLs  - Assess for home care needs following discharge   - Consider OT consult to assist with ADL evaluation and planning for discharge  - Provide patient education as appropriate  Outcome: Progressing  Goal: Maintains/Returns to pre admission functional level  Description: INTERVENTIONS:  - Perform BMAT or MOVE assessment daily    - Set and communicate daily mobility goal to care team and patient/family/caregiver  - Collaborate with rehabilitation services on mobility goals if consulted  - Perform Range s a day  - Reposition patienrs  - Dangle   - Stand   - Out of bed to coleen  - Out of bed for  - Out of bed for toileting  - Record patient progress and toleration of activity level   Outcome: Progressing     Problem: Nutrition/Hydration-ADULT  Goal: Nutrient/Hydration intake appropriate for improving, restoring or maintaining nutritional needs  Description: Monitor and assess patient's nutrition/hydration status for malnutrition  Collaborate with interdisciplinary team and initiate plan and interventions as ordered  Monitor patient's weight and dietary intake as ordered or per policy  Utilize nutrition screening tool and intervene as necessary  Determine patient's food preferences and provide high-protein, high-caloric foods as appropriate       INTERVENTIONS:  - Monitor oral intake, urinary output, labs, and treatment plans  - Assess nutrition and hydration status and recommend course of action  - Evaluate amount of meals eaten  - Assist patient with eating if necessary   - Allow adequate time for meals  - Recommend/ encourage appropriate diets, oral nutritional supplements, and vitamin/mineral supplements  - Order, calculate, and assess calorie counts as needed  - Recommend, monitor, and adjust tube feedings and TPN/PPN based on assessed needs  - Assess need for intravenous fluids  - Provide specific nutrition/hydration education as appropriate  - Include patient/family/caregiver in decisions related to nutrition  Outcome: Progressing

## 2023-05-27 NOTE — PROGRESS NOTES
05/27/23 0935   Pain Assessment   Pain Assessment Tool 0-10   Pain Score No Pain   Restrictions/Precautions   Precautions Aphasia; Aspiration;Bed/chair alarms;Cognitive; Fall Risk;Supervision on toilet/commode  (R side inattention)   Comprehension   Comprehension (FIM) 4 - Understands basic info/conversation 75-90% of time   Expression   Expression (FIM) 2 - Uses only simple expressions or gestures (waves, hello)   Social Interaction   Social Interaction (FIM) 5 - Interacts appropriately with others 90% of time   Problem Solving   Problem solving (FIM) 3 - Solves basic problmes 50-74% of time   Memory   Memory (FIM) 3 - Recognizes, recalls/performs 50-74%   Speech/Language/Cognition Assessment   Treatment Assessment Pt participated in skilled ST session focusing on expressive and receptive language skills  Pt engaged in the following tasks:     Expression of Orientation and Biographical Info:  Pt accurately stated his full name, son's name and dtr's name  Increased difficulty in verbalizing remaining biographical info in comparison to previous sessions  Required use of carrier phrase to state his wife's name and initial phonemic cue for dtr's name  While pt made close attempts at stating his full address, presence of phonemic paraphasias with approximations of words  Pt with difficulty imitating SLP's words, noting perseveration on previous attempts, specifically related to numbers  While pt cont with difficulty in stating numbers for his age, able to accurately ID his age when given written Fo2  For orientation, pt accurately verbalized situation  Required use of yes/no questions for place  Given [address] written choices, pt accurate for year but remained errored for current month, despite additional verbal cues being provided       Verbal Expression:  Engaged in a basic sentence completion task in which pt was verbally presented with sentences, pt verbally provided an appropriate word to complete the sentence in 24/30 trials, independently  Pt benefited from multi-modal cuing consisting of gestural cues, verbal cues and initial phonemic cues to improve word retrieval  In a more complex task, pt was verbally presented with three different single word characteristics and accurately stated the described items for 4/12 trials  This task was more difficult for pt as he benefited from more moderate to occasional max cuing with cues ranging from additional verbal cues, phrase completion cues and initial phonemic cues  Pt continues to respond well to various types of cues to improve word finding abilities  Written Expression:  Utilizing a white board and marker, pt accurately wrote his full name, noting mild-moderately decreased legibility but overall message was known  When requested to write additional biographical info, pt with significant difficulty, in which despite verbal and written cues, pt remained unable to effectively write info  Presented with pictures of common objects, pt accurately named 3/4 objects  After naming objects, pt attempted to write the names of objects in which he continued to demonstrated significant difficulty  Pt was provided with direct written models and requested to copy letters and words  Pt noted to accurately copy letters only sporadically, but also noted to write additional unrelated letters, as well as illegible marks/attempts at letters  While pt with insight into level of difficulty with this task, he did report that it was improved since the last time that he attempted written expression  Reading Comprehension:  Presented with two written words, one word which was accurately spelled and another word which was spelled incorrectly, but only differed by one single letter in spelling, pt accurately identified the correctly spelled word for 5/5 trials and was also able to accurately read 5/5 words aloud      In more informal conversation, pt presented with overall good comprehension and with improvement in verbal expression  Pt's verbal expression continues to be strongest at the word or very short phrase level, however, pt is improving in ability to self monitor and self correct any expression errors or apraxia like errors  Overall, pt continues to present with expressive and receptive language deficits/aphasia at this time  Pt benefits from use of yes/no questions to confirm his communicative intent and to increase comprehension  At this time, pt is recommended for further skilled SLP services during acute rehab stay in order to improve both expressive/receptive language skills and to maximize overall functional communication abilities  SLP Therapy Minutes   SLP Time In 36   SLP Time Out 8946   SLP Total Time (minutes) 60   SLP Mode of treatment - Individual (minutes) 60   SLP Mode of treatment - Concurrent (minutes) 0   SLP Mode of treatment - Group (minutes) 0   SLP Mode of treatment - Co-treat (minutes) 0   SLP Mode of Treatment - Total time(minutes) 60 minutes   SLP Cumulative Minutes 260   Therapy Time missed   Time missed?  No

## 2023-05-27 NOTE — PROGRESS NOTES
05/27/23 1230   Pain Assessment   Pain Assessment Tool 0-10   Pain Score No Pain   Restrictions/Precautions   Precautions Aphasia; Aspiration;Bed/chair alarms;Cognitive; Fall Risk; [place]; Supervision on toilet/commode  (R inattention)   Weight Bearing Restrictions No   ROM Restrictions No   Eating   Type of Assistance Needed Physical assistance   Physical Assistance Level 25% or less   Comment Nsg communicated to AGUILAR that pt having difficulty during lunch earlier today scooping pudding from cup  AGUILAR provided pt with 3 different built-up spoons (weighted vs non-weighted) to trial with pudding, pt demo most success scooping from cup with built-up weighted spoon, so AGUILAR left this spoon in pt's room for future meals, notified rehab aide and nsg  Pt required Jacky for self-feeding task with [place] A to bring spoon to target of pudding (otherwise pt under/overshoots target 2* ataxia) but then pt able to bring spoon of pudding to mouth  Eating CARE Score 3   Sit to Lying   Type of Assistance Needed Supervision   Physical Assistance Level No physical assistance   Comment CS w/bedrail   Sit to Lying CARE Score 4   Sit to Stand   Type of Assistance Needed Physical assistance   Physical Assistance Level 25% or less   Sit to Stand CARE Score 3   Bed-Chair Transfer   Type of Assistance Needed Physical assistance   Physical Assistance Level 26%-50%   Comment SPT w/HHA   Chair/Bed-to-Chair Transfer CARE Score 3   Coordination   Gross Motor Seated in w/c, pt engaged in RUE bean bag toss to bucket target initially 5ft away then upgraded to 7ft then 10ft, with focus on gross motor movements and motor planning  Pt tolerated well, enjoying activity, and approx 75% successful throwing bag into target, slightly declining as distance was increased     Fine Motor Seated at tabletop, pt engaged in [address] reaching across midline to retrieve golf balls and then placing into colored cups on pt's R side, with focus on hand to target accuracy, "[address], grasp/release, and R visual scanning/R attention  Pt able to grasp release golf balls well, but demo max difficulty locating cup by color (i e  \"place the ball in the purple cup\") with pt freezing and then placing ball in incorrect colored cup  Downgraded task to placing ball into cup that carrizales was pointing to, which pt able to complete except for unable to locate farthest right cup despite max verbal and tactile cues  Cognition   Overall Cognitive Status Impaired   Arousal/Participation Alert; Cooperative   Attention Attends with cues to redirect   Orientation Level Oriented to person;Oriented to situation   Memory Decreased short term memory;Decreased recall of recent events   Following Commands Follows one step commands inconsistently   Activity Tolerance   Activity Tolerance Patient tolerated treatment well   Assessment   Treatment Assessment Pt seen for 60min skilled OT session focused on bed mobility, fxl transfers, RUE neuro re-ed, RUE GMC/FMC, visual scanning to R VF, R attention, and trialing built-up spoon for improved indep w/self-feeding  See detailed descriptions of fxl performance above  Pt tolerated session well, although cont to be significantly limited by aphasia, ataxia, decreased fxl cognition, decreased standing balance, weakness, endurance, safety awareness, and visual deficits  Pt would benefit from continued skilled OT focused on ADL retraining, fxl transfer training, UE strengthening/ROM, endurance work, cog reorientation, neuro re-ed, FMC/GMC, ECT, and activity engagement  Prognosis Fair   Problem List Decreased strength;Decreased endurance; Impaired balance;Decreased mobility; Decreased coordination;Decreased cognition; Impaired judgement;Decreased safety awareness;Decreased range of motion; Impaired vision;Pain; Impaired sensation   Barriers to Discharge Inaccessible home environment;Decreased caregiver support   Plan   Treatment/Interventions ADL retraining;Functional transfer " training; Therapeutic exercise; Endurance training;Cognitive reorientation;Patient/family training;Equipment eval/education; Bed mobility; Compensatory technique education   Progress Progressing toward goals   Recommendation   OT Discharge Recommendation   (pending)   OT Therapy Minutes   OT Time In 1230   OT Time Out 1330   OT Total Time (minutes) 60   OT Mode of treatment - Individual (minutes) 60   OT Mode of treatment - Concurrent (minutes) 0   OT Mode of treatment - Group (minutes) 0   OT Mode of treatment - Co-treat (minutes) 0   OT Mode of Treatment - Total time(minutes) 60 minutes   OT Cumulative Minutes 1075   Therapy Time missed   Time missed?  No

## 2023-05-27 NOTE — PROGRESS NOTES
PM&R Coverage Progress Note:    Rehabilitation Diagnosis: CVA    ASSESSMENT: Stable      PLAN:    Rehabilitation  • Continue current rehabilitation plan of care to maximize function  • No funcitonal barriers identified    Medical issues  • No acute concerns  • Continue current medical plan of care  Appreciate IM consultants co-management  SUBJECTIVE: Patient seen face to face  No acute issues  Progressing as expected in rehabilitation program          ROS:  A ten point review of systems was completed on 05/27/23 and pertinent positives are listed in subjective section  All other systems reviewed were negative  OBJECTIVE:   /68 (BP Location: Right arm)   Pulse 80   Temp 98 °F (36 7 °C) (Oral)   Resp 19   Wt 98 3 kg (216 lb 11 4 oz)   SpO2 97%   BMI 32 95 kg/m²     Physical Exam  Vitals and nursing note reviewed  Constitutional:       General: He is not in acute distress  Appearance: He is well-developed  HENT:      Head: Normocephalic  Nose: Nose normal    Eyes:      Conjunctiva/sclera: Conjunctivae normal    Cardiovascular:      Rate and Rhythm: Normal rate and regular rhythm  Heart sounds: Normal heart sounds  Pulmonary:      Effort: Pulmonary effort is normal       Breath sounds: Normal breath sounds  No wheezing  Abdominal:      General: Bowel sounds are normal  There is no distension  Palpations: Abdomen is soft  Musculoskeletal:      Cervical back: Neck supple  Skin:     General: Skin is warm  Neurological:      Mental Status: He is alert        Comments: +aphasia   Psychiatric:         Mood and Affect: Mood normal             Personally reviewed on 05/27/23:   Lab Results   Component Value Date    HCT 35 1 (L) 05/25/2023    HGB 12 0 05/25/2023    MCV 96 05/25/2023     05/25/2023    WBC 11 93 (H) 05/25/2023     Lab Results   Component Value Date    BUN 23 05/25/2023    CALCIUM 9 0 05/25/2023     (H) 05/25/2023    CO2 25 05/25/2023 CREATININE 1 02 05/25/2023    GLUC 88 05/25/2023    K 3 7 05/25/2023    SODIUM 136 05/25/2023     Lab Results   Component Value Date    INR 1 03 05/05/2023    INR 1 15 04/26/2023    INR 0 89 04/16/2023    PROTIME 13 7 05/05/2023    PROTIME 14 9 (H) 04/26/2023    PROTIME 12 7 04/16/2023           Current Facility-Administered Medications:   •  acetaminophen (TYLENOL) tablet 975 mg, 975 mg, Oral, TID PRN, Guille Toth MD  •  aspirin (ECOTRIN LOW STRENGTH) EC tablet 81 mg, 81 mg, Oral, Daily, Severo , DO, 81 mg at 05/27/23 1732  •  atorvastatin (LIPITOR) tablet 40 mg, 40 mg, Oral, Daily, Severo , DO, 40 mg at 05/27/23 6563  •  bisacodyl (DULCOLAX) rectal suppository 10 mg, 10 mg, Rectal, Daily PRN, Severo , DO  •  calcium carbonate (TUMS) chewable tablet 1,000 mg, 1,000 mg, Oral, Daily PRN, Severo , DO  •  citalopram (CeleXA) tablet 20 mg, 20 mg, Oral, Daily, Severo Caddy, DO, 20 mg at 05/27/23 0957  •  donepezil (ARICEPT) tablet 5 mg, 5 mg, Oral, BID, Severo , DO, 5 mg at 05/27/23 0957  •  heparin (porcine) subcutaneous injection 5,000 Units, 5,000 Units, Subcutaneous, Q8H [address], Severo Caddy, DO, 5,000 Units at 05/27/23 1347  •  lidocaine (LIDODERM) 5 % patch 1 patch, 1 patch, Topical, Daily, Severo Solomon DO, 1 patch at 05/27/23 1001  •  lidocaine (URO-JET) 2 % urethral/mucosal gel, , Topical, Q4H PRN, Guille Toth MD, 5 application   at 05/19/23 1005  •  losartan (COZAAR) tablet 50 mg, 50 mg, Oral, Daily, ERIC Rodriguez, 50 mg at 05/27/23 8896  •  methocarbamol (ROBAXIN) tablet 500 mg, 500 mg, Oral, Q6H PRN, Severo Caddy, , 500 mg at 05/26/23 2213  •  metoprolol succinate (TOPROL-XL) 24 hr tablet 100 mg, 100 mg, Oral, Daily, Severo Caddy, , 100 mg at 05/27/23 4684  •  ondansetron (ZOFRAN-ODT) dispersible tablet 4 mg, 4 mg, Oral, Q6H PRN, Severo Caddy, DO  •  oxyCODONE (ROXICODONE) IR tablet 5 mg, 5 mg, Oral, Q6H PRN, Severo Caddy, DO  •  oxyCODONE (ROXICODONE) split tablet 2 5 mg, 2 5 mg, Oral, Q6H PRN, Cristian Havers, DO  •  pantoprazole (PROTONIX) EC tablet 20 mg, 20 mg, Oral, Early Morning, Cristian Havers, DO, 20 mg at 05/27/23 0550  •  polyethylene glycol (MIRALAX) packet 17 g, 17 g, Oral, QAM, Cristian Havers, DO, 17 g at 05/25/23 0911  •  senna (SENOKOT) tablet 17 2 mg, 2 tablet, Oral, Daily PRN, Cristian Havers, DO  •  senna-docusate sodium (SENOKOT S) 8 6-50 mg per tablet 2 tablet, 2 tablet, Oral, BID, Cristian Havers, DO, 2 tablet at 05/26/23 2115  •  tamsulosin (FLOMAX) capsule 0 8 mg, 0 8 mg, Oral, Daily With Dinner, ERIC Quiroz, 0 8 mg at 05/26/23 1700  •  ticagrelor (BRILINTA) tablet 90 mg, 90 mg, Oral, Q12H [address], Cristian Havers, DO, 90 mg at 05/27/23 1002  •  traZODone (DESYREL) tablet 50 mg, 50 mg, Oral, HS, Kendell Elizalde MD, 50 mg at 05/26/23 2213    Past Medical History:   Diagnosis Date   • Depression    • Diabetes mellitus ([place] [address] )    • Dyslipidemia 03/26/2019   • Hyperlipidemia    • Hypertension    • Stroke [facility])        Patient Active Problem List    Diagnosis Date Noted   • Abnormal laboratory test 05/15/2023   • Leukocytosis 05/12/2023   • History of tobacco use 05/12/2023   • Chronic ischemic left MCA stroke 05/12/2023   • Hypokalemia 05/12/2023   • Abdominal aortic aneurysm (AAA) ([place] [address] ) 05/08/2023   • Tachycardia 05/05/2023   • Prediabetes 04/27/2023   • SIRS (systemic inflammatory response syndrome) ([place] [address] ) 04/27/2023   • Chronic anticoagulation 04/26/2023   • Stroke ([place] [address] ) 04/26/2023   • Hyponatremia 04/26/2023   • Middle cerebral artery stenosis, right 04/17/2023   • Left posterior MCA stroke - etiology unclear at this time 04/17/2023   • Chronic low back pain 04/16/2023   • Hypertensive encephalopathy, transient 01/12/2023   • Snoring 08/10/2020   • Memory deficits 08/10/2020   • Chronic ischemic right MCA stroke 08/06/2019   • Status post placement of implantable loop recorder 04/06/2019   • Type 2 diabetes mellitus (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 ([facility] [address] ) 03/28/2019   • Urinary retention 03/27/2019   • At risk for venous thromboembolism (VTE) 03/26/2019   • Hypertriglyceridemia 03/26/2019   • Nicotine dependence 03/26/2019   • Bilateral carotid artery stenosis 03/26/2019   • Presbyopia 03/26/2019   • History of stroke 03/19/2019   • Headache 03/19/2019   • Primary hypertension 03/19/2019   • GERD (gastroesophageal reflux disease) 03/19/2019          Cory Bryant MD  PM&R      I have spent a total time of 20 minutes on 05/27/23 in caring for this patient including Impressions and Documenting in the medical record  ** Please Note:  voice to text software may have been used in the creation of this document   Although proof errors in transcription or interpretation are a potential of such software**

## 2023-05-27 NOTE — PROGRESS NOTES
Internal Medicine Progress Note  Patient: Jacquie Lundberg  Age/sex: 58 y o  male  Medical Record #: [id number]      ASSESSMENT/PLAN: (Interval History)  Jacquie Lundberg is seen and examined and management for following issues:    Acute multifocal ischemic strokes  • Occurred in different arterial distributions  • IVY was negative for thrombus/PFO  • Felt not to be vasculitis and negative by a-gram 5/4/23  • Continue ASA/Brilinta/statin     Left ICA stenosis  • Was noted to have all of the CVAs in last month have been in left anterior circulation  • S/p PTA/stent 5/4/23  • Recheck carotid doppler 6 weeks and see NS  • Continue AP/statin     Right ICA stenosis  • To followup with Vasc surgery as OP  • Continue AP/statin     LOOP recorder  • Was placed 3/2019  • Neuro wants it to be replaced = for OP to Cardiology     Leukocytosis  • Had no fevers  • CXR and Procal were normal in the hospital  • Mild/improving     HTN  • Home:  Toprol 100mg qd/Norvasc 10mg qd/Losartan 50mg qd/HCTZ 25mg qd/Hydralazine 25mg TID  • Here:  Toprol 100mg qd/Losartan 50mg qd  • stable     Pre DM  • HbA1C 5 8  • Takes Metformin at home but currently not on it in hospital  • Accuchecks were stable and dc'd in the hospital  • Monitor FBS with BMPs and continue DM diet  • FBS was 90 on 5/22/23 and 88 on 5/25/23     Depression  • Continue Celexa as at home     Memory loss  • Continue Aricept as at home  • He has had memory issues since his CVA in 2019     Urine retention  • Has jo placed 5/8/23  • VT 5/18 failed and jo replaced by Urology 5/19/23 and is not to be removed  • Continue Flomax     Chronic LBP  • MRI showed advanced multilevel spondylosis, slightly progressed on the right at L2-3 compared to the prior study but otherwise stable  • Pain management per PMR     AAA  • Found on L-spine MRI  • Measures 3 4 cm  • OP followup     Hyponatremia  • Stopped HCTZ 5/16/23    • Resolved off of HCTZ and had been given IVF  • BMP 5/29     Leg cramps  · Getting in both calves  · He says he gets them at home and walks them out  · Will check mag level with next labs 5/28      Discharge date:  Reteam       The above assessment and plan was reviewed and updated as determined by my evaluation of the patient on 5/27/2023      Labs:   Results from last 7 days   Lab Units 05/25/23  0549 05/22/23  0615   HEMATOCRIT % 35 1* 36 3*   HEMOGLOBIN g/dL 12 0 12 1   PLATELETS Thousands/uL 288 294   WBC Thousand/uL 11 93* 12 96*     Results from last 7 days   Lab Units 05/25/23  0549 05/22/23  0615   BUN mg/dL 23 16   CALCIUM mg/dL 9 0 9 1   CHLORIDE mmol/L 109* 110*   CO2 mmol/L 25 25   CREATININE mg/dL 1 02 1 03   POTASSIUM mmol/L 3 7 4 0   SODIUM mmol/L 136 137                   Review of Scheduled Meds:  Current Facility-Administered Medications   Medication Dose Route Frequency Provider Last Rate   • acetaminophen  975 mg Oral TID PRN Kin Morales MD     • aspirin  81 mg Oral Daily Haze Arvind, DO     • atorvastatin  40 mg Oral Daily Haze Arvind, DO     • bisacodyl  10 mg Rectal Daily PRN Haze Arvind, DO     • calcium carbonate  1,000 mg Oral Daily PRN Haze Arvind, DO     • citalopram  20 mg Oral Daily Haze Arvind, DO     • donepezil  5 mg Oral BID Haze Arvind, DO     • heparin (porcine)  5,000 Units Subcutaneous Q8H [facility] Haze Arvind, DO     • lidocaine  1 patch Topical Daily Haze Arvind, DO     • lidocaine   Topical Q4H PRN Kin Morales MD     • losartan  50 mg Oral Daily ERIC Valencia     • methocarbamol  500 mg Oral Q6H PRN Haze Arvind, DO     • metoprolol succinate  100 mg Oral Daily Haze Arvind, DO     • ondansetron  4 mg Oral Q6H PRN Haze Arvind, DO     • oxyCODONE  5 mg Oral Q6H PRN Haze Arvind, DO     • oxyCODONE  2 5 mg Oral Q6H PRN Haze Arvind, DO     • pantoprazole  20 mg Oral Early Morning Haze Arvind, DO     • polyethylene glycol  17 g Oral QAM Haze Arvind, DO     • senna  2 tablet Oral Daily PRN Leigha Arroyo DO     • senna-docusate sodium  2 tablet Oral BID Leigha Arroyo DO     • tamsulosin  0 8 mg Oral Daily With 5 Gunnerjoshua Herring CRNARENDRA     • ticagrelor  90 mg Oral Q12H [address] Leigha Arroyo DO     • traZODone  50 mg Oral HS Jessica Sheridan MD         Subjective/ HPI: Patient seen and examined  Patients overnight issues or events were reviewed with nursing or staff during rounds or morning huddle session  New or overnight issues include the following:     No new or overnight issues  Offers no complaints    ROS:   A 10 point ROS was performed; negative except as noted above  Imaging:     No orders to display       *Labs /Radiology studies reviewed  *Medications reviewed and reconciled as needed  *Please refer to order section for additional ordered labs studies  *Case discussed with primary attending during morning huddle case rounds    Physical Examination:  Vitals:   Vitals:    05/26/23 1434 05/26/23 2100 05/27/23 0500 05/27/23 0958   BP: 156/72 147/69 (!) 176/76 160/62   BP Location: Right arm Right arm Right arm    Pulse: 74 66 68 78   Resp: 12 18 18    Temp: 99 3 °F (37 4 °C) 98 7 °F (37 1 °C) 97 9 °F (36 6 °C)    TempSrc: Oral Oral Oral    SpO2: 98% 97% 99%    Weight:           General Appearance: no distress, conversive  HEENT: PERRLA, conjuctiva normal; oropharynx clear; mucous membranes moist   Neck:  Supple, normal ROM  Lungs: CTA, normal respiratory effort, no retractions, expiratory effort normal  CV: regular rate and rhythm; no rubs/murmurs/gallops, PMI normal   ABD: soft; ND/NT; +BS  EXT: no edema  Skin: normal turgor, normal texture, no rashes  Psych: affect normal, mood normal  Neuro: AAO  +expressive>receptive aphasia as before      The above physical exam was reviewed and updated as determined by my evaluation of the patient on 5/27/2023      Invasive Devices     Drain  Duration           Urethral Catheter Coude 16 Fr  7 days                   VTE Pharmacologic Prophylaxis: Heparin  Code Status: Level 1 - Full Code  Current Length of Stay: 15 day(s)      Total time spent:  30 minutes with more than 50% spent counseling/coordinating care  Counseling includes discussion with patient re: progress  and discussion with patient of his/her current medical state/information  Coordination of patient's care was performed in conjunction with primary service  Time invested included review of patient's labs, vitals, and management of their comorbidities with continued monitoring  In addition, this patient was discussed with medical team including physician and advanced extenders  The care of the patient was extensively discussed and appropriate treatment plan was formulated unique for this patient  Medical decision making for the day was made by supervising physician unless otherwise noted in their attestation statement  ** Please Note:  voice to text software may have been used in the creation of this document   Although proof errors in transcription or interpretation are a potential of such software**

## 2023-05-28 LAB
ANION GAP SERPL CALCULATED.3IONS-SCNC: 3 MMOL/L (ref 4–13)
BASOPHILS # BLD AUTO: 0.11 THOUSANDS/ÂΜL (ref 0–0.1)
BASOPHILS NFR BLD AUTO: 1 % (ref 0–1)
BUN SERPL-MCNC: 19 MG/DL (ref 5–25)
CALCIUM SERPL-MCNC: 9.1 MG/DL (ref 8.3–10.1)
CHLORIDE SERPL-SCNC: 109 MMOL/L (ref 96–108)
CO2 SERPL-SCNC: 26 MMOL/L (ref 21–32)
CREAT SERPL-MCNC: 1.04 MG/DL (ref 0.6–1.3)
EOSINOPHIL # BLD AUTO: 0.77 THOUSAND/ÂΜL (ref 0–0.61)
EOSINOPHIL NFR BLD AUTO: 7 % (ref 0–6)
ERYTHROCYTE [DISTWIDTH] IN BLOOD BY AUTOMATED COUNT: 14.1 % (ref 11.6–15.1)
GFR SERPL CREATININE-BSD FRML MDRD: 75 ML/MIN/1.73SQ M
GLUCOSE P FAST SERPL-MCNC: 94 MG/DL (ref 65–99)
GLUCOSE SERPL-MCNC: 94 MG/DL (ref 65–140)
HCT VFR BLD AUTO: 36.5 % (ref 36.5–49.3)
HGB BLD-MCNC: 12 G/DL (ref 12–17)
IMM GRANULOCYTES # BLD AUTO: 0.07 THOUSAND/UL (ref 0–0.2)
IMM GRANULOCYTES NFR BLD AUTO: 1 % (ref 0–2)
LYMPHOCYTES # BLD AUTO: 3.13 THOUSANDS/ÂΜL (ref 0.6–4.47)
LYMPHOCYTES NFR BLD AUTO: 29 % (ref 14–44)
MAGNESIUM SERPL-MCNC: 2.3 MG/DL (ref 1.6–2.6)
MCH RBC QN AUTO: 31.9 PG (ref 26.8–34.3)
MCHC RBC AUTO-ENTMCNC: 32.9 G/DL (ref 31.4–37.4)
MCV RBC AUTO: 97 FL (ref 82–98)
MONOCYTES # BLD AUTO: 1.03 THOUSAND/ÂΜL (ref 0.17–1.22)
MONOCYTES NFR BLD AUTO: 10 % (ref 4–12)
NEUTROPHILS # BLD AUTO: 5.58 THOUSANDS/ÂΜL (ref 1.85–7.62)
NEUTS SEG NFR BLD AUTO: 52 % (ref 43–75)
NRBC BLD AUTO-RTO: 0 /100 WBCS
PLATELET # BLD AUTO: 277 THOUSANDS/UL (ref 149–390)
PMV BLD AUTO: 10.2 FL (ref 8.9–12.7)
POTASSIUM SERPL-SCNC: 3.6 MMOL/L (ref 3.5–5.3)
RBC # BLD AUTO: 3.76 MILLION/UL (ref 3.88–5.62)
SODIUM SERPL-SCNC: 138 MMOL/L (ref 135–147)
WBC # BLD AUTO: 10.69 THOUSAND/UL (ref 4.31–10.16)

## 2023-05-28 PROCEDURE — 80048 BASIC METABOLIC PNL TOTAL CA: CPT | Performed by: NURSE PRACTITIONER

## 2023-05-28 PROCEDURE — 85025 COMPLETE CBC W/AUTO DIFF WBC: CPT | Performed by: NURSE PRACTITIONER

## 2023-05-28 PROCEDURE — 83735 ASSAY OF MAGNESIUM: CPT | Performed by: NURSE PRACTITIONER

## 2023-05-28 PROCEDURE — 97112 NEUROMUSCULAR REEDUCATION: CPT

## 2023-05-28 PROCEDURE — 92507 TX SP LANG VOICE COMM INDIV: CPT

## 2023-05-28 PROCEDURE — 99232 SBSQ HOSP IP/OBS MODERATE 35: CPT | Performed by: INTERNAL MEDICINE

## 2023-05-28 RX ADMIN — POLYETHYLENE GLYCOL 3350 17 G: 17 POWDER, FOR SOLUTION ORAL at 09:31

## 2023-05-28 RX ADMIN — HEPARIN SODIUM 5000 UNITS: 5000 INJECTION INTRAVENOUS; SUBCUTANEOUS at 13:19

## 2023-05-28 RX ADMIN — ASPIRIN 81 MG: 81 TABLET, COATED ORAL at 09:33

## 2023-05-28 RX ADMIN — SENNOSIDES, DOCUSATE SODIUM 2 TABLET: 8.6; 5 TABLET ORAL at 09:32

## 2023-05-28 RX ADMIN — DONEPEZIL HYDROCHLORIDE 5 MG: 5 TABLET ORAL at 09:33

## 2023-05-28 RX ADMIN — DONEPEZIL HYDROCHLORIDE 5 MG: 5 TABLET ORAL at 17:42

## 2023-05-28 RX ADMIN — CITALOPRAM HYDROBROMIDE 20 MG: 20 TABLET ORAL at 09:33

## 2023-05-28 RX ADMIN — TICAGRELOR 90 MG: 90 TABLET ORAL at 21:28

## 2023-05-28 RX ADMIN — TICAGRELOR 90 MG: 90 TABLET ORAL at 09:41

## 2023-05-28 RX ADMIN — LIDOCAINE 5% 1 PATCH: 700 PATCH TOPICAL at 09:41

## 2023-05-28 RX ADMIN — SENNOSIDES, DOCUSATE SODIUM 2 TABLET: 8.6; 5 TABLET ORAL at 17:42

## 2023-05-28 RX ADMIN — PANTOPRAZOLE SODIUM 20 MG: 20 TABLET, DELAYED RELEASE ORAL at 05:07

## 2023-05-28 RX ADMIN — METOPROLOL SUCCINATE 100 MG: 100 TABLET, EXTENDED RELEASE ORAL at 09:33

## 2023-05-28 RX ADMIN — TRAZODONE HYDROCHLORIDE 50 MG: 50 TABLET ORAL at 21:28

## 2023-05-28 RX ADMIN — LOSARTAN POTASSIUM 50 MG: 50 TABLET, FILM COATED ORAL at 09:33

## 2023-05-28 RX ADMIN — TAMSULOSIN HYDROCHLORIDE 0.8 MG: 0.4 CAPSULE ORAL at 15:41

## 2023-05-28 RX ADMIN — HEPARIN SODIUM 5000 UNITS: 5000 INJECTION INTRAVENOUS; SUBCUTANEOUS at 05:07

## 2023-05-28 RX ADMIN — HEPARIN SODIUM 5000 UNITS: 5000 INJECTION INTRAVENOUS; SUBCUTANEOUS at 21:28

## 2023-05-28 RX ADMIN — ATORVASTATIN CALCIUM 40 MG: 40 TABLET, FILM COATED ORAL at 09:32

## 2023-05-28 NOTE — PLAN OF CARE
Problem: PAIN - ADULT  Goal: Verbalizes/displays adequate comfort level or baseline comfort level  Description: Interventions:  - Encourage patient to monitor pain and request assistance  - Assess pain using appropriate pain scale  - Administer analgesics based on type and severity of pain and evaluate response  - Implement non-pharmacological measures as appropriate and evaluate response  - Consider cultural and social influences on pain and pain management  - Notify physician/advanced practitioner if interventions unsuccessful or patient reports new pain  Outcome: Progressing     Problem: INFECTION - ADULT  Goal: Absence or prevention of progression during hospitalization  Description: INTERVENTIONS:  - Assess and monitor for signs and symptoms of infection  - Monitor lab/diagnostic results  - Monitor all insertion sites, i e  indwelling lines, tubes, and drains  - Monitor endotracheal if appropriate and nasal secretions for changes in amount and color  - [place] appropriate cooling/warming therapies per order  - Administer medications as ordered  - Instruct and encourage patient and family to use good hand hygiene technique  - Identify and instruct in appropriate isolation precautions for identified infection/condition  Outcome: Progressing  Goal: Absence of fever/infection during neutropenic period  Description: INTERVENTIONS:  - Monitor WBC    Outcome: Progressing     Problem: SAFETY ADULT  Goal: Patient will remain free of falls  Description: INTERVENTIONS:  - Educate patient/family on patient safety including physical limitations  - Instruct patient to call for assistance with activity   - Consult OT/PT to assist with strengthening/mobility   - Keep Call bell within reach  - Keep bed low and locked with side rails adjusted as appropriate  - Keep care items and personal belongings within reach  - Initiate and maintain comfort rounds  - Make Fall Risk Sign visible to staff  - Offer Toileting every 2 Hours, in advance of need  - Initiate/Maintain bed/chair alarm  - Obtain necessary fall risk management equipment: alarms  - Apply yellow socks and bracelet for high fall risk patients  - Consider moving patient to room near nurses station  Outcome: Progressing  Goal: Maintain or return to baseline ADL function  Description: INTERVENTIONS:  -  Assess patient's ability to carry out ADLs; assess patient's baseline for ADL function and identify physical deficits which impact ability to perform ADLs (bathing, care of mouth/teeth, toileting, grooming, dressing, etc )  - Assess/evaluate cause of self-care deficits   - Assess range of motion  - Assess patient's mobility; develop plan if impaired  - Assess patient's need for assistive devices and provide as appropriate  - Encourage maximum independence but intervene and supervise when necessary  - Involve family in performance of ADLs  - Assess for home care needs following discharge   - Consider OT consult to assist with ADL evaluation and planning for discharge  - Provide patient education as appropriate  Outcome: Progressing  Goal: Maintains/Returns to pre admission functional level  Description: INTERVENTIONS:  - Perform BMAT or MOVE assessment daily    - Set and communicate daily mobility goal to care team and patient/family/caregiver  - Collaborate with rehabilitation services on mobility goals if consulted  - Perform Range of Motion 3 times a day  - Reposition patient every 2 hours    - Dangle patient 3 times a day  - Stand patient 3 times a day  - Ambulate patient 3 times a day  - Out of bed to chair 3 times a day   - Out of bed for meals 3 times a day  - Out of bed for toileting  - Record patient progress and toleration of activity level   Outcome: Progressing     Problem: DISCHARGE PLANNING  Goal: Discharge to home or other facility with appropriate resources  Description: INTERVENTIONS:  - Identify barriers to discharge w/patient and caregiver  - Arrange for needed discharge resources and transportation as appropriate  - Identify discharge learning needs (meds, wound care, etc )  - Arrange for interpretive services to assist at discharge as needed  - Refer to Case Management Department for coordinating discharge planning if the patient needs post-hospital services based on physician/advanced practitioner order or complex needs related to functional status, cognitive ability, or social support system  Outcome: Progressing     Problem: Prexisting or High Potential for Compromised Skin Integrity  Goal: Skin integrity is maintained or improved  Description: INTERVENTIONS:  - Identify patients at risk for skin breakdown  - Assess and monitor skin integrity  - Assess and monitor nutrition and hydration status  - Monitor labs   - Assess for incontinence   - Turn and reposition patient  - Assist with mobility/ambulation  - Relieve pressure over bony prominences  - Avoid friction and shearing  - Provide appropriate hygiene as needed including keeping skin clean and dry  - Evaluate need for skin moisturizer/barrier cream  - Collaborate with interdisciplinary team   - Patient/family teaching  - Consider wound care consult   Outcome: Progressing     Problem: MOBILITY - ADULT  Goal: Maintain or return to baseline ADL function  Description: INTERVENTIONS:  -  Assess patient's ability to carry out ADLs; assess patient's baseline for ADL function and identify physical deficits which impact ability to perform ADLs (bathing, care of mouth/teeth, toileting, grooming, dressing, etc )  - Assess/evaluate cause of self-care deficits   - Assess range of motion  - Assess patient's mobility; develop plan if impaired  - Assess patient's need for assistive devices and provide as appropriate  - Encourage maximum independence but intervene and supervise when necessary  - Involve family in performance of ADLs  - Assess for home care needs following discharge   - Consider OT consult to assist with ADL evaluation and planning for discharge  - Provide patient education as appropriate  Outcome: Progressing  Goal: Maintains/Returns to pre admission functional level  Description: INTERVENTIONS:  - Perform BMAT or MOVE assessment daily    - Set and communicate daily mobility goal to care team and patient/family/caregiver  - Collaborate with rehabilitation services on mobility goals if consulted  - Perform Range of Motion 3 times a day  - Reposition patient every 2 hours  - Dangle patient 3 times a day  - Stand patient 3 times a day  - Ambulate patient 3 times a day  - Out of bed to chair 3 times a day   - Out of bed for meals 3 times a day  - Out of bed for toileting  - Record patient progress and toleration of activity level   Outcome: Progressing     Problem: Nutrition/Hydration-ADULT  Goal: Nutrient/Hydration intake appropriate for improving, restoring or maintaining nutritional needs  Description: Monitor and assess patient's nutrition/hydration status for malnutrition  Collaborate with interdisciplinary team and initiate plan and interventions as ordered  Monitor patient's weight and dietary intake as ordered or per policy  Utilize nutrition screening tool and intervene as necessary  Determine patient's food preferences and provide high-protein, high-caloric foods as appropriate       INTERVENTIONS:  - Monitor oral intake, urinary output, labs, and treatment plans  - Assess nutrition and hydration status and recommend course of action  - Evaluate amount of meals eaten  - Assist patient with eating if necessary   - Allow adequate time for meals  - Recommend/ encourage appropriate diets, oral nutritional supplements, and vitamin/mineral supplements  - Order, calculate, and assess calorie counts as needed  - Recommend, monitor, and adjust tube feedings and TPN/PPN based on assessed needs  - Assess need for intravenous fluids  - Provide specific nutrition/hydration education as appropriate  - Include patient/family/caregiver in decisions related to nutrition  Outcome: Progressing

## 2023-05-28 NOTE — PROGRESS NOTES
Physical Medicine and Rehabilitation Progress Note  Lisa Brady 58 y o  male MRN: [id number]  Unit/Bed#: -01 Encounter: [id number]      Assessment & Plan:     Decline in ADLs and mobility: Functional assessment - improving Gen transfers        FIM  Care Score  Admit Score Recent Score    Total assist  1-100% or 2p    Tot ADLs    Max assist 2-51-75%    Sub  LBD   Mod assist 3- 26-50%  Par  Bathing   Min assist 3- 25% or < Par  UBD   CG assist 4  TA     Sup/Setup 4-5 Sup     Mod-I/Indep 6 MI      Transfers  Total assist  Gen - mod assist  Toilet - Total     Ambulation   Total assist  Total assist     Stairs   Total assist/NT      Goal: CGA-supervision for most ADLs and  for mobility  Major barriers:  Impaired cognition, speech, balance, deconditioning  Dispo: Home v SNF with [facility] 21+ days from admission      * Stroke [facility])  Assessment & Plan  5/24-25 - neuro exam stable   5/23 - improving mobility and ADLs   - 900 min program   Recent multifocal ischemic infarcts in different distributions of unclear etiology   · Imaging revealed multifocal strokes; underwent L ICA PTA/stenting 5/4 also hx of R ICA stenosis   · Has loop recorder placed 2019 and neuro wants OP replacement of loop recorder - OP cards f/u   · CT of the chest abdomen pelvis without evidence of malignancy done on the last admission on 4/17  · A-gram not c/w vasculitis   · Thrombosis panel done in 04/18/2023 that was only abnl for AT3 that was low in the acute setting and needs to be repeated in future  Repeat OP thrombosis panel thru neuro or hematology  · Recent IVY with no PFO  · Previous TTE 04/18/23 showed ED of 55% and nl wall motion and mildly dilated L atrium     · Continue Brilinta and aspirin as well as statin for secondary stroke prophylaxis   · Continue Physical therapy, Occupational Therapy, speech therapy  · Monitor neuro exam closely       Bilateral carotid artery stenosis  Assessment & Plan  · Status post left ICA angioplasty and stent placement neurovascular service  · Per IM- S/p L ICA PTA/stent 5/4/23;  Recheck carotid doppler 6 weeks and see NS - has OP follow-up with Dr Chanel Rodriguez 6/26  · Continue Brilinta, aspirin and statin  · BP mgmt per IM here  · OP vasc sx follow-up      Anxiety and depression  Assessment & Plan  Mood somewhat down as wife not sure she will be able to manage him and he may need additional recovery time in lower level setting prior to d/c home  Continues to sleep better, tolerating med regimen  Anxiety, depression, significant insomnia with difficulty adjusting   Provided additional supportive counseling  Neuropsych c/s 5/22  Adjustment Disorder with Anxiety and Depression  R/O Post Stroke Depression  • Supportive psychotherapy, utilizing CBT and mindfulness strategies  • DBT distress tolerance techniques  to improve coping and mood  • Meditation and relaxation training  Continue chronic home Celexa 20mg qday   Continue Trazodone 50mg at bedtime  - Monitor for excessive serotonin - not appreciable thus far    -Monitor for signs and symptoms of excessive serotonin  -Monitor mood, efficacy, tolerance     Urinary retention  Assessment & Plan  - Urology c/s 5/19 for ongoing retention > jo re-inserted   - Continue flomax to 0 8mg qday, can trial voiding again before d/c next week   - Monitor for infection   - Optimal bowel mgmt, mobilize  - OP urology follow-up       Leukocytosis  Assessment & Plan  · WBC 12 9>11 9 on 5/25  · Comgmt with IM   · Monitor for s/s of infection or other etiologies; monitor vitals/labs     Hypertriglyceridemia  Assessment & Plan  Continue statin    Primary hypertension  Assessment & Plan  · Acceptable  · Mgmt per IM: ly, metoprolol succinate 100 mg daily as well as Cozaar 50 mg daily   · Hctz stopped    GERD (gastroesophageal reflux disease)  Assessment & Plan  Continue pantoprazole and as needed Tums    At risk for venous thromboembolism (VTE)  Assessment & Plan  SCDs, ambulation, and heparin "      Abnormal laboratory test  Assessment & Plan  AT3 89% on 4/2023 lab per prior providers; can be low from recent stroke and not true AT3 def  - Recommend follow-up with neurology and repeat thrombosis panel as an outpatient     Hypokalemia  Assessment & Plan  · Remains improved 5/25    Chronic ischemic left MCA stroke  Assessment & Plan  · Recent left MCA stroke in the beginning of April at 4/16/2023 with aphasia and was empirically treated with Eliquis 5 mg twice daily as well as aspirin 81 mg due to embolic stroke of undetermined source per prior documentation  · Despite this we presented with additional strokes for this admission    History of tobacco use  Assessment & Plan  · Patient with a history of tobacco use yet quit in 2019  · Does not need nicotine patches    Prediabetes  Assessment & Plan  · Last hemoglobin A1c of 5 8  · Mgmt per IM during [facility]   · Started on consistent carbohydrate 3 diet here in addition to cardiac diet on admission to [facility]    Chronic low back pain  Assessment & Plan  · Adequate control  · APAP TID PRN - discussed with pt   · Robaxin 500mg Q6H PRN spasms   · Oxy 2 5-5mg Q6H PRN   · Continue lidocaine patch and aqua K-pad but not in the same area at the same time    Memory deficits  Assessment & Plan  · Started on Aricept due to memory difficulties after stroke in 2019  · Currently on 5 mg twice daily  · Sees Dr Kp Carlson in outpatient neurology    Chronic ischemic right MCA stroke  Assessment & Plan  · Patient with history of right MCA stroke back in March 2019 status post thrombectomy  · Had loop recorder placement without overt arrhythmia and was on Plavix 75 mg at that time  Also with known severe right M1 stenosis  · See \"Stroke\" mgmt above         Other Medical Issues:  • Monitor for     Follow-up providers and other issues to be followed up after discharge  PCP  Neuro  Cards  Vasc Sx     CODE: Level 1: Full Code    Restrictions include:   Fall " precautions    Objective:     Allergies per EMR  Diagnostic Studies: Reviewed, no new imaging  No orders to display     See above as well    Laboratory: Labs reviewed  Results from last 7 days   Lab Units 05/25/23  0549 05/22/23  0615   HEMATOCRIT % 35 1* 36 3*   HEMOGLOBIN g/dL 12 0 12 1   WBC Thousand/uL 11 93* 12 96*     Results from last 7 days   Lab Units 05/25/23  0549 05/22/23  0615   BUN mg/dL 23 16   CHLORIDE mmol/L 109* 110*   CREATININE mg/dL 1 02 1 03   POTASSIUM mmol/L 3 7 4 0                Drug regimen reviewed, all potential adverse effects identified and addressed:    Scheduled Meds:  Current Facility-Administered Medications   Medication Dose Route Frequency Provider Last Rate   • acetaminophen  975 mg Oral TID PRN Ayo Lin MD     • aspirin  81 mg Oral Daily Mariposa Marker, DO     • atorvastatin  40 mg Oral Daily Mariposa Marker, DO     • bisacodyl  10 mg Rectal Daily PRN Mariposa Marker, DO     • calcium carbonate  1,000 mg Oral Daily PRN Mariposa Marker, DO     • citalopram  20 mg Oral Daily Mariposa Marker, DO     • donepezil  5 mg Oral BID Mariposa Marker, DO     • heparin (porcine)  5,000 Units Subcutaneous Q8H [facility] Mariposa Marker, DO     • lidocaine  1 patch Topical Daily Mariposa Marker, DO     • lidocaine   Topical Q4H PRN Ayo Lin MD     • losartan  50 mg Oral Daily ERIC Gagnon     • methocarbamol  500 mg Oral Q6H PRN Mariposa Marker, DO     • metoprolol succinate  100 mg Oral Daily Mariposa Marker, DO     • ondansetron  4 mg Oral Q6H PRN Mariposa Marker, DO     • oxyCODONE  5 mg Oral Q6H PRN Mariposa Marker, DO     • oxyCODONE  2 5 mg Oral Q6H PRN Mariposa Marker, DO     • pantoprazole  20 mg Oral Early Morning Mariposa Marker, DO     • polyethylene glycol  17 g Oral QAM Mariposa Marker, DO     • senna  2 tablet Oral Daily PRN Mariposa Marker, DO     • senna-docusate sodium  2 tablet Oral BID Mariposa Marker, DO     • tamsulosin  0 8 mg Oral Daily With ERIC Donald "  • ticagrelor  90 mg Oral Q12H [address] María Mak DO     • traZODone  50 mg Oral HS Ignacio Joseph MD         Chief Complaints:  Stroke rehab follow-up     Subjective: On eval, patient notes some frustration that he may not be able to go directly home from acute rehab  He reports still sleeping adequately  He denies worsening strength, speech, fever, chills, lightheadedness or other new complaints  ROS: A 10 point ROS was performed; negative except as noted above  Physical Exam:  Temperature 98 2 °F pulse 78 blood pressure 157/67  Vitals above reviewed on date of encounter    GEN:  Sitting in NAD   HEENT/NECK: MMM  CARDIAC: Regular rate rhythm, no murmers, no rubs, no gallops  LUNGS:  clear to auscultation, no wheezes, rales, or rhonchi  ABDOMEN: Soft, non-tender, non-distended, normal active bowel sounds  EXTREMITIES/SKIN:  no calf edema, no calf tenderness to palpation  NEURO:   MENTAL STATUS: Stable wakefulness, interaction, aphasia, apraxia and Strength/MMT:  Stable  PSYCH:  Affect:  Somewhat down    HPI:  Per admitting provider   \"Constanza Reynoso is a 58 y o  male with history of prediabetes, hypertension, depression, urinary retention on Flomax, carotid stenosis, depression, prior left MCA and right MCA stroke with aphasia and memory deficits now on Aricept who presented to the [facility] on 4/26 for AMS witnessed by his wife with abnormal speech that was noted to be similar to his prior strokes  Of note he was recently in the hospital earlier in the month for a left MCA stroke  He also had a right MCA stroke status post thrombectomy back in 2019  He is a prior smoker and quit at the time of his initial stroke  Additionally he had hyponatremia which was felt to be due to HCTZ use and potentially poor oral intake, chronic low back pain    He was seen by neurology as well as neurosurgery and eventually switched from his Eliquis/aspirin to aspirin and " "Brilinta  It was felt not to be cardioembolic by neurosurgery  He was also not given TNK given his recent Eliquis use when he arrived  He had imaging prior on his last admission however now with new focus of diffusion abnormality in the right frontal parietal region and in the left periatrial and parieto-occipital region  No acute hemorrhage was seen  Had a carotid Doppler showing LICA 20-85%/JWVP <60%   IVY was done and did not show any PFO or thrombus  Given that patient was on Plavix when he had his stroke on 04/16/2023, he was placed on Nadira Cleaves was a question of vasculitis as the etiology but Neurosurgery saw him in consult and felt that it was likely related to atherosclerotic and carotid disease and they stopped the Eliquis and placed back on ASA   He had a cerebral arteriogram on 5/4 23 with a left ICA PTA/stent placement   There was no vasculitis noted    Per Neurology full anticoagulation did not need to be restarted  The patient was evaluated by the Rehabilitation team and deemed an appropriate candidate for comprehensive inpatient rehabilitation and admitted to the [facility] on 5/12/2023  2:35 PM\"    ** Please Note: Fluency Direct voice to text software may have been used in the creation of this document  **    I personally performed the required components and examined the patient myself in person on 5/25/23        "

## 2023-05-28 NOTE — PROGRESS NOTES
Physical Medicine and Rehabilitation Progress Note  Wilber Trevino 58 y o  male MRN: [id number]  Unit/Bed#: -01 Encounter: [id number]      Assessment & Plan:     Decline in ADLs and mobility: Functional assessment - improving slowly         FIM  Care Score  Admit Score Recent Score    Total assist  1-100% or 2p    Tot ADLs    Max assist 2-51-75%    Sub  LBD   Mod assist 3- 26-50%  Par  Bathing   Min assist 3- 25% or < Par  UBD   CG assist 4  TA     Sup/Setup 4-5 Sup     Mod-I/Indep 6 MI      Transfers  Total assist  Gen - mod assist  Toilet - Total     Ambulation   Total assist  Total assist     Stairs   Total assist/NT      Goal: CGA-supervision for most ADLs and  for mobility  Major barriers:  Impaired cognition, speech, balance, deconditioning  Dispo: Likely SNF now      * Stroke [facility])  Assessment & Plan  5/26 - speech/aphasia improving   - 900 min program   Recent multifocal ischemic infarcts in different distributions of unclear etiology   · Imaging revealed multifocal strokes; underwent L ICA PTA/stenting 5/4 also hx of R ICA stenosis   · Has loop recorder placed 2019 and neuro wants OP replacement of loop recorder - OP cards f/u   · CT of the chest abdomen pelvis without evidence of malignancy done on the last admission on 4/17  · A-gram not c/w vasculitis   · Thrombosis panel done in 04/18/2023 that was only abnl for AT3 that was low in the acute setting and needs to be repeated in future  Repeat OP thrombosis panel thru neuro or hematology  · Recent IVY with no PFO  · Previous TTE 04/18/23 showed ED of 55% and nl wall motion and mildly dilated L atrium     · Continue Brilinta and aspirin as well as statin for secondary stroke prophylaxis   · Continue Physical therapy, Occupational Therapy, speech therapy  · Monitor neuro exam closely       Bilateral carotid artery stenosis  Assessment & Plan  · Status post left ICA angioplasty and stent placement neurovascular service  · Per IM- S/p L ICA PTA/stent 5/4/23;  Recheck carotid doppler 6 weeks and see NS - has OP follow-up with Dr Misty Metzger 6/26  · Continue Brilinta, aspirin and statin  · BP mgmt per IM here  · OP vasc sx follow-up      Anxiety and depression  Assessment & Plan  Mood somewhat down as wife not sure she will be able to manage him and he may need additional recovery time in lower level setting prior to d/c home  Continues to sleep better, tolerating med regimen  Anxiety, depression, significant insomnia with difficulty adjusting   Provided additional supportive counseling  Neuropsych c/s 5/22  Adjustment Disorder with Anxiety and Depression  R/O Post Stroke Depression  • Supportive psychotherapy, utilizing CBT and mindfulness strategies  • DBT distress tolerance techniques  to improve coping and mood  • Meditation and relaxation training  Continue chronic home Celexa 20mg qday   Continue Trazodone 50mg at bedtime  - Monitor for excessive serotonin - not appreciable thus far    -Monitor for signs and symptoms of excessive serotonin  -Monitor mood, efficacy, tolerance     Urinary retention  Assessment & Plan  - Urology c/s 5/19 for ongoing retention > jo re-inserted   - Continue flomax to 0 8mg qday, can trial voiding again before d/c next week   - Monitor for infection   - Optimal bowel mgmt, mobilize  - OP urology follow-up       Leukocytosis  Assessment & Plan  · WBC 12 9>11 9 on 5/25  · Comgmt with IM   · Monitor for s/s of infection or other etiologies; monitor vitals/labs     Hypertriglyceridemia  Assessment & Plan  Continue statin    Primary hypertension  Assessment & Plan  · Adequate control  · Mgmt per IM: ly, metoprolol succinate 100 mg daily as well as Cozaar 50 mg daily   · Hctz stopped    GERD (gastroesophageal reflux disease)  Assessment & Plan  Continue pantoprazole and as needed Tums    At risk for venous thromboembolism (VTE)  Assessment & Plan  SCDs, ambulation, and heparin       Abnormal laboratory test  Assessment & Plan  AT3 89% on "4/2023 lab per prior providers; can be low from recent stroke and not true AT3 def  - Recommend follow-up with neurology and repeat thrombosis panel as an outpatient     Hypokalemia  Assessment & Plan  · Remains improved 5/25    Chronic ischemic left MCA stroke  Assessment & Plan  · Recent left MCA stroke in the beginning of April at 4/16/2023 with aphasia and was empirically treated with Eliquis 5 mg twice daily as well as aspirin 81 mg due to embolic stroke of undetermined source per prior documentation  · Despite this we presented with additional strokes for this admission    History of tobacco use  Assessment & Plan  · Patient with a history of tobacco use yet quit in 2019  · Does not need nicotine patches    Prediabetes  Assessment & Plan  · Last hemoglobin A1c of 5 8  · Mgmt per IM during [facility]   · Started on consistent carbohydrate 3 diet here in addition to cardiac diet on admission to [facility]    Chronic low back pain  Assessment & Plan  · Adequate control  · APAP TID PRN - discussed with pt   · Robaxin 500mg Q6H PRN spasms   · Oxy 2 5-5mg Q6H PRN   · Continue lidocaine patch and aqua K-pad but not in the same area at the same time    Memory deficits  Assessment & Plan  · Started on Aricept due to memory difficulties after stroke in 2019  · Currently on 5 mg twice daily  · Sees Dr Kp Carlson in outpatient neurology    Chronic ischemic right MCA stroke  Assessment & Plan  · Patient with history of right MCA stroke back in March 2019 status post thrombectomy  · Had loop recorder placement without overt arrhythmia and was on Plavix 75 mg at that time  Also with known severe right M1 stenosis  · See \"Stroke\" mgmt above         Other Medical Issues:  • Monitor for     Follow-up providers and other issues to be followed up after discharge  PCP  Neuro  Cards  Vasc Sx     CODE: Level 1: Full Code    Restrictions include: Fall precautions    Objective:     Allergies per EMR  Diagnostic Studies: Reviewed, no new imaging  No " orders to display     See above as well    Laboratory: Labs reviewed  Results from last 7 days   Lab Units 05/25/23  0549 05/22/23  0615   HEMATOCRIT % 35 1* 36 3*   HEMOGLOBIN g/dL 12 0 12 1   WBC Thousand/uL 11 93* 12 96*     Results from last 7 days   Lab Units 05/25/23  0549 05/22/23  0615   BUN mg/dL 23 16   CHLORIDE mmol/L 109* 110*   CREATININE mg/dL 1 02 1 03   POTASSIUM mmol/L 3 7 4 0                Drug regimen reviewed, all potential adverse effects identified and addressed:    Scheduled Meds:  Current Facility-Administered Medications   Medication Dose Route Frequency Provider Last Rate   • acetaminophen  975 mg Oral TID PRN Jaki Lea MD     • aspirin  81 mg Oral Daily Pooja Rosales DO     • atorvastatin  40 mg Oral Daily Pooja Rosales DO     • bisacodyl  10 mg Rectal Daily PRN Pooja Rosales, DO     • calcium carbonate  1,000 mg Oral Daily PRN Pooja Rosales, DO     • citalopram  20 mg Oral Daily Pooja Rosales, DO     • donepezil  5 mg Oral BID Pooja Rosales, DO     • heparin (porcine)  5,000 Units Subcutaneous Q8H [address] Pooja Rosales DO     • lidocaine  1 patch Topical Daily Pooja Rosales DO     • lidocaine   Topical Q4H PRN Jaki Lea MD     • losartan  50 mg Oral Daily ERIC Olivares     • methocarbamol  500 mg Oral Q6H PRN Pooja Rosales, DO     • metoprolol succinate  100 mg Oral Daily Pooja Rosales DO     • ondansetron  4 mg Oral Q6H PRN Pooja oRsales, DO     • oxyCODONE  5 mg Oral Q6H PRN Pooja Rosales, DO     • oxyCODONE  2 5 mg Oral Q6H PRN Pooja Rosales DO     • pantoprazole  20 mg Oral Early Morning Pooja Rosales DO     • polyethylene glycol  17 g Oral QAM Pooja Rosales, DO     • senna  2 tablet Oral Daily PRN Pooja Rosales, DO     • senna-docusate sodium  2 tablet Oral BID Pooja Rosales DO     • tamsulosin  0 8 mg Oral Daily With Dinner Tori Hack, CRNP     • ticagrelor  90 mg Oral Q12H [address] Pooja Rosales DO     • traZODone  50 mg Oral HS Prabha Santos "Kwame Kinsey MD         Chief Complaints:  Stroke rehab follow-up     Subjective: On eval, patient denies lightheadedness, fever, chills constipation, worsening strength worsening mood or other new complaints  ROS: A 10 point ROS was performed; negative except as noted above  Physical Exam:  05/26/23 0543 98 9 °F (37 2 °C) 78 18 139/66 99 98 % None (Room air)     Vitals above reviewed on date of encounter    GEN:  Sitting in gym  HEENT/NECK: MMM  CARDIAC: Regular rate rhythm, no murmers, no rubs, no gallops  LUNGS:  clear to auscultation, no wheezes, rales, or rhonchi  ABDOMEN: Soft, non-tender, non-distended, normal active bowel sounds  EXTREMITIES/SKIN:  no calf edema, no calf tenderness to palpation  NEURO:   MENTAL STATUS: Improving speech, stable wakefulness, orientation intact and Strength/MMT:  Stable  PSYCH:  Affect:  Less down    HPI:  Per admitting provider   \"Constanza Cochran is a 58 y o  male with history of prediabetes, hypertension, depression, urinary retention on Flomax, carotid stenosis, depression, prior left MCA and right MCA stroke with aphasia and memory deficits now on Aricept who presented to the [facility] on 4/26 for AMS witnessed by his wife with abnormal speech that was noted to be similar to his prior strokes  Of note he was recently in the hospital earlier in the month for a left MCA stroke  He also had a right MCA stroke status post thrombectomy back in 2019  He is a prior smoker and quit at the time of his initial stroke  Additionally he had hyponatremia which was felt to be due to HCTZ use and potentially poor oral intake, chronic low back pain  He was seen by neurology as well as neurosurgery and eventually switched from his Eliquis/aspirin to aspirin and Brilinta  It was felt not to be cardioembolic by neurosurgery  He was also not given TNK given his recent Eliquis use when he arrived    He had imaging prior on his last admission " "however now with new focus of diffusion abnormality in the right frontal parietal region and in the left periatrial and parieto-occipital region  No acute hemorrhage was seen  Had a carotid Doppler showing LICA 79-34%/SMRM <13%   IVY was done and did not show any PFO or thrombus  Given that patient was on Plavix when he had his stroke on 04/16/2023, he was placed on Roselie Burner was a question of vasculitis as the etiology but Neurosurgery saw him in consult and felt that it was likely related to atherosclerotic and carotid disease and they stopped the Eliquis and placed back on ASA   He had a cerebral arteriogram on 5/4 23 with a left ICA PTA/stent placement   There was no vasculitis noted    Per Neurology full anticoagulation did not need to be restarted  The patient was evaluated by the Rehabilitation team and deemed an appropriate candidate for comprehensive inpatient rehabilitation and admitted to the [facility] on 5/12/2023  2:35 PM\"    ** Please Note: Fluency Direct voice to text software may have been used in the creation of this document   **    I personally performed the required components and examined the patient myself in person on 5/26/23      "

## 2023-05-28 NOTE — PLAN OF CARE
Problem: PAIN - ADULT  Goal: Verbalizes/displays adequate comfort level or baseline comfort level  Description: Interventions:  - Encourage patient to monitor pain and request assistance  - Assess pain using appropriate pain scale  - Administer analgesics based on type and severity of pain and evaluate response  - Implement non-pharmacological measures as appropriate and evaluate response  - Consider cultural and social influences on pain and pain management  - Notify physician/advanced practitioner if interventions unsuccessful or patient reports new pain  Outcome: Progressing     Problem: INFECTION - ADULT  Goal: Absence or prevention of progression during hospitalization  Description: INTERVENTIONS:  - Assess and monitor for signs and symptoms of infection  - Monitor lab/diagnostic results  - Monitor all insertion sites, i e  indwelling lines, tubes, and drains  - Monitor endotracheal if appropriate and nasal secretions for changes in amount and color  - [place] appropriate cooling/warming therapies per order  - Administer medications as ordered  - Instruct and encourage patient and family to use good hand hygiene technique  - Identify and instruct in appropriate isolation precautions for identified infection/condition  Outcome: Progressing  Goal: Absence of fever/infection during neutropenic period  Description: INTERVENTIONS:  - Monitor WBC    Outcome: Progressing     Problem: SAFETY ADULT  Goal: Patient will remain free of falls  Description: INTERVENTIONS:  - Educate patient/family on patient safety including physical limitations  - Instruct patient to call for assistance with activity   - Consult OT/PT to assist with strengthening/mobility   - Keep Call bell within reach  - Keep bed low and locked with side rails adjusted as appropriate  - Keep care items and personal belongings within reach  - Initiate and maintain comfort rounds  - Make Fall Risk Sign visible to staff  - Offer Toileting in advance of need  - In  - Apply yellow socks and bracelet for high fall risk patients  - Consider moving patient to room near nurses station  Outcome: Progressing  Goal: Maintain or return to baseline ADL function  Description: INTERVENTIONS:  -  Assess patient's ability to carry out ADLs; assess patient's baseline for ADL function and identify physical deficits which impact ability to perform ADLs (bathing, care of mouth/teeth, toileting, grooming, dressing, etc )  - Assess/evaluate cause of self-care deficits   - Assess range of motion  - Assess patient's mobility; develop plan if impaired  - Assess patient's need for assistive devices and provide as appropriate  - Encourage maximum independence but intervene and supervise when necessary  - Involve family in performance of ADLs  - Assess for home care needs following discharge   - Consider OT consult to assist with ADL evaluation and planning for discharge  - Provide patient education as appropriate  Outcome: Progressing  Goal: Maintains/Returns to pre admission functional level  Description: INTERVENTIONS:  - Perform BMAT or MOVE assessment daily    - Set and communicate daily mobility goal to care team and patient/family/caregiver     - Collaborate with rehabilitation services on mobility goals if consulted  - Perform Range of Alejandro  - Out of bed for toileting  - Record patient progress and toleration of activity level   Outcome: Progressing     Problem: DISCHARGE PLANNING  Goal: Discharge to home or other facility with appropriate resources  Description: INTERVENTIONS:  - Identify barriers to discharge w/patient and caregiver  - Arrange for needed discharge resources and transportation as appropriate  - Identify discharge learning needs (meds, wound care, etc )  - Arrange for interpretive services to assist at discharge as needed  - Refer to Case Management Department for coordinating discharge planning if the patient needs post-hospital services based on physician/advanced practitioner order or complex needs related to functional status, cognitive ability, or social support system  Outcome: Progressing     Problem: Prexisting or High Potential for Compromised Skin Integrity  Goal: Skin integrity is maintained or improved  Description: INTERVENTIONS:  - Identify patients at risk for skin breakdown  - Assess and monitor skin integrity  - Assess and monitor nutrition and hydration status  - Monitor labs   - Assess for incontinence   - Turn and reposition patient  - Assist with mobility/ambulation  - Relieve pressure over bony prominences  - Avoid friction and shearing  - Provide appropriate hygiene as needed including keeping skin clean and dry  - Evaluate need for skin moisturizer/barrier cream  - Collaborate with interdisciplinary team   - Patient/family teaching  - Consider wound care consult   Outcome: Progressing     Problem: MOBILITY - ADULT  Goal: Maintain or return to baseline ADL function  Description: INTERVENTIONS:  -  Assess patient's ability to carry out ADLs; assess patient's baseline for ADL function and identify physical deficits which impact ability to perform ADLs (bathing, care of mouth/teeth, toileting, grooming, dressing, etc )  - Assess/evaluate cause of self-care deficits   - Assess range of motion  - Assess patient's mobility; develop plan if impaired  - Assess patient's need for assistive devices and provide as appropriate  - Encourage maximum independence but intervene and supervise when necessary  - Involve family in performance of ADLs  - Assess for home care needs following discharge   - Consider OT consult to assist with ADL evaluation and planning for discharge  - Provide patient education as appropriate  Outcome: Progressing  Goal: Maintains/Returns to pre admission functional level  Description: INTERVENTIONS:  - Perform BMAT or MOVE assessment daily    - Set and communicate daily mobility goal to care team and patient/family/caregiver     - Collaborate with rehabilitation services on mobility goals if consulted  - Perform Range of Alejandro  - Out of bed to coleen  - Out   - Out of bed for toileting  - Record patient progress and toleration of activity level   Outcome: Progressing     Problem: Nutrition/Hydration-ADULT  Goal: Nutrient/Hydration intake appropriate for improving, restoring or maintaining nutritional needs  Description: Monitor and assess patient's nutrition/hydration status for malnutrition  Collaborate with interdisciplinary team and initiate plan and interventions as ordered  Monitor patient's weight and dietary intake as ordered or per policy  Utilize nutrition screening tool and intervene as necessary  Determine patient's food preferences and provide high-protein, high-caloric foods as appropriate       INTERVENTIONS:  - Monitor oral intake, urinary output, labs, and treatment plans  - Assess nutrition and hydration status and recommend course of action  - Evaluate amount of meals eaten  - Assist patient with eating if necessary   - Allow adequate time for meals  - Recommend/ encourage appropriate diets, oral nutritional supplements, and vitamin/mineral supplements  - Order, calculate, and assess calorie counts as needed  - Recommend, monitor, and adjust tube feedings and TPN/PPN based on assessed needs  - Assess need for intravenous fluids  - Provide specific nutrition/hydration education as appropriate  - Include patient/family/caregiver in decisions related to nutrition  Outcome: Progressing

## 2023-05-28 NOTE — PROGRESS NOTES
"   [id number]   Pain Assessment   Pain Assessment Tool 0-10   Pain Score No Pain   Restrictions/Precautions   Precautions Aphasia; Aspiration;Bed/chair alarms;Cognitive; Fall Risk;Supervision on toilet/commode  (R inattention)   Comprehension   Comprehension (FIM) 4 - Understands basic info/conversation 75-90% of time   Expression   Expression (FIM) 2 - Uses only simple expressions or gestures (waves, hello)   Social Interaction   Social Interaction (FIM) 5 - Interacts appropriately with others 90% of time   Problem Solving   Problem solving (FIM) 3 - Solves basic problmes 50-74% of time   Memory   Memory (FIM) 3 - Recognizes, recalls/performs 50-74%   Speech/Language/Cognition Assessment   Treatment Assessment Pt participated in skilled ST session focusing on expressive and receptive language skills  At beginning of session, pt engaged in [facility] recall of recent events such as visitors and other therapies completed to which he demo accurate recall and while doing so, he was able to express himself using single words and short sentences to convey info  Nursing also present administering medications and pain patches where pt able to follow verbal one step directions without difficulty  Pt engaged in the following tasks:     Expression of Orientation and Biographical Info:  Pt accurately stated his name and full address  Pt demo difficulty in eliciting his age and , suspecting difficulty occurring due to the involvement of numbers  A combination of verbal, phrase completion and phonemic cues were used to facilitate increased word retrieval, however, pt ultimately benefited from visual [id number] choices in order to correctly ID his  and age  Verbal Expression:  Given a basic concept category, pt was able to name an item belonging to the category for 4/15 opportunities, independently  Pt verbalized increased difficulty with verbal expression on this day, stating that he felt a little \"off  \" During task, he made " "comments such as \"It's off today, it's all off  \" Pt required overall moderate to occasionally max cuing to improve word retrieval skills in which multi-modal cues were used to include gestural cues, verbal cues, semantic cues and phonemic cues  During more informal conversation in order to allow pt a break from more structured tasks, pt remained inconsistent in word finding of topics which were of his interest  Pt continued to state \"See, trouble today,\" however, pt's verbal expression did not appear far different from his baseline, though pt did present with lower frustration tolerance this session  Reading Comprehension:  Attempted to engage pt in a reading comprehension task of greater difficulty as he had been doing well with single word comprehension  Presented with a written sentence completion task of basic/short sentences, along with [facility] written word choices, pt demo significant difficulty and required overall max assist with task as SLP needed to read aloud sentences and word choices for pt to be more successful  Pt's auditory comprehension remains greater than reading comprehension skills at this time  Focusing on single word reading comprehension, pt was provided with cards of single written words, along with three written categories  Pt accurately sorted words into their categories with 17/20 accuracy  Provided with verbal cues and SLP reading names of categories aloud, pt was able to determine the category for remaining items or correct errors  Increased time required for processing of information and to complete task  During task, pt at times with decreased attention to his R had as he would take a card into his R hand from the pile which he was holding in his L hand  Pt then did not attend to his R hand without both verbal and physical/tactile cues  Pt also read words aloud to SLP throughout task in which he would then name aloud the category, rather than only placing into a pile   Pt accurately " read aloud 17/20 total target words, in which for other three words, pt presented with paraphasias and neologisms  Given min to moderate phonemic cues, pt improved in stating three targets  Of note, pt continued to improve in his ability to self monitor and at times, self correct his speech  Overall, pt continues to present with expressive>receptive language deficits/aphasia at this time  Pt benefits from use of yes/no questions to confirm his communicative intent and to increase comprehension, along with increased time for verbal speech  Pt responds well to various cuing including phrase completion cues, semantic cues, and phonemic cues  At this time, pt is recommended for further skilled SLP services during acute rehab stay in order to improve both expressive/receptive language skills and to maximize overall functional communication abilities  SLP Therapy Minutes   SLP Time In 0930   SLP Time Out 1030   SLP Total Time (minutes) 60   SLP Mode of treatment - Individual (minutes) 60   SLP Mode of treatment - Concurrent (minutes) 0   SLP Mode of treatment - Group (minutes) 0   SLP Mode of treatment - Co-treat (minutes) 0   SLP Mode of Treatment - Total time(minutes) 60 minutes   SLP Cumulative Minutes 835   Therapy Time missed   Time missed?  No

## 2023-05-28 NOTE — PROGRESS NOTES
Internal Medicine Progress Note  Patient: Ajith Kemp  Age/sex: 58 y o  male  Medical Record #: [id number]      ASSESSMENT/PLAN: (Interval History)  Ajith Kemp is seen and examined and management for following issues:    Acute multifocal ischemic strokes  • Occurred in different arterial distributions  • IVY was negative for thrombus/PFO  • Felt not to be vasculitis and negative by a-gram 5/4/23  • Continue ASA/Brilinta/statin     Left ICA stenosis  • Was noted to have all of the CVAs in last month have been in left anterior circulation  • S/p PTA/stent 5/4/23  • Recheck carotid doppler 6 weeks and see NS  • Continue AP/statin     Right ICA stenosis  • To followup with Vasc surgery as OP  • Continue AP/statin     LOOP recorder  • Was placed 3/2019  • Neuro wants it to be replaced = for OP to Cardiology     Leukocytosis  • Had no fevers  • CXR and Procal were normal in the hospital  • Mild/improving     HTN  • Home:  Toprol 100mg qd/Norvasc 10mg qd/Losartan 50mg qd/HCTZ 25mg qd/Hydralazine 25mg TID  • Here:  Toprol 100mg qd/Losartan 50mg qd  • stable     Pre DM  • HbA1C 5 8  • Takes Metformin at home but currently not on it in hospital  • Accuchecks were stable and dc'd in the hospital  • Monitor FBS with BMPs and continue DM diet  • FBS 94 today 5/28/23     Depression  • Continue Celexa as at home     Memory loss  • Continue Aricept as at home  • He has had memory issues since his CVA in 2019     Urine retention  • Has jo placed 5/8/23  • VT 5/18 failed and jo replaced by Urology 5/19/23 and is not to be removed  • Continue Flomax     Chronic LBP  • MRI showed advanced multilevel spondylosis, slightly progressed on the right at L2-3 compared to the prior study but otherwise stable  • Pain management per PMR     AAA  • Found on L-spine MRI  • Measures 3 4 cm  • OP followup     Hyponatremia  • Stopped HCTZ 5/16/23    • Resolved off of HCTZ and had been given IVF     Leg cramps  • Getting in both calves  • He says he gets them at home and walks them out  • Mag level today 5/28 was 2 3 and K+ 3 6        Discharge date:  [facility]       The above assessment and plan was reviewed and updated as determined by my evaluation of the patient on 5/28/2023      Labs:   Results from last 7 days   Lab Units 05/28/23  0507 05/25/23  0549   HEMATOCRIT % 36 5 35 1*   HEMOGLOBIN g/dL 12 0 12 0   PLATELETS Thousands/uL 277 288   WBC Thousand/uL 10 69* 11 93*     Results from last 7 days   Lab Units 05/28/23  0507 05/25/23  0549   BUN mg/dL 19 23   CALCIUM mg/dL 9 1 9 0   CHLORIDE mmol/L 109* 109*   CO2 mmol/L 26 25   CREATININE mg/dL 1 04 1 02   POTASSIUM mmol/L 3 6 3 7   SODIUM mmol/L 138 136                   Review of Scheduled Meds:  Current Facility-Administered Medications   Medication Dose Route Frequency Provider Last Rate   • acetaminophen  975 mg Oral TID PRN Blas Fenton MD     • aspirin  81 mg Oral Daily Sharlie Apley, DO     • atorvastatin  40 mg Oral Daily Sharlie Apley, DO     • bisacodyl  10 mg Rectal Daily PRN Sharlie Apley, DO     • calcium carbonate  1,000 mg Oral Daily PRN Sharlie Apley, DO     • citalopram  20 mg Oral Daily Sharlie Apley, DO     • donepezil  5 mg Oral BID Sharlie Apley, DO     • heparin (porcine)  5,000 Units Subcutaneous [facility] Sharlie Apley, DO     • lidocaine  1 patch Topical Daily Sharlie Apley, DO     • lidocaine   Topical Q4H PRN Blas Fenton MD     • losartan  50 mg Oral Daily ERIC Nice     • menthol-methyl salicylate   Apply externally 4x Daily PRN Sangita Roman MD     • methocarbamol  500 mg Oral Q6H PRN Sharlie Apley, DO     • metoprolol succinate  100 mg Oral Daily Sharlie Apley, DO     • ondansetron  4 mg Oral Q6H PRN Sharlie Apley, DO     • oxyCODONE  5 mg Oral Q6H PRN Sharlie Apley, DO     • oxyCODONE  2 5 mg Oral Q6H PRN Sharlie Apley, DO     • pantoprazole  20 mg Oral Early Morning Sharlie Apley, DO     • polyethylene glycol  17 g Oral QAM Umu Peralta, DO     • senna  2 tablet Oral Daily PRN Umu Peralta DO     • senna-docusate sodium  2 tablet Oral BID Umu Peralta DO     • tamsulosin  0 8 mg Oral Daily With [address] CRNP     • ticagrelor  90 mg Oral Q12H [facility] Umu Peralta, DO     • traZODone  50 mg Oral HS Silverio Samaniego MD         Subjective/ HPI: Patient seen and examined  Patients overnight issues or events were reviewed with nursing or staff during rounds or morning huddle session  New or overnight issues include the following:     No new or overnight issues  Offers no complaints    ROS:   A 10 point ROS was performed; negative except as noted above  Imaging:     No orders to display       *Labs /Radiology studies reviewed  *Medications reviewed and reconciled as needed  *Please refer to order section for additional ordered labs studies  *Case discussed with primary attending during morning huddle case rounds    Physical Examination:  Vitals:   Vitals:    05/27/23 1353 05/27/23 2049 05/28/23 0509 05/28/23 0927   BP: 120/68 119/56 149/69 140/80   BP Location: Right arm Right arm Left arm Right arm   Pulse: 80 68 78 84   Resp: 19 20 20    Temp: 98 °F (36 7 °C) 98 9 °F (37 2 °C) 98 °F (36 7 °C)    TempSrc: Oral Oral Oral    SpO2: 97% 98% 97%    Weight:           General Appearance: no distress, conversive  HEENT:  External ear normal   Nose normal w/o drainage  Mucous membranes are moist  Oropharynx is clear  Conjunctiva clear w/o icterus or redness  Neck:  Supple, normal ROM  Lungs: BBS without crackles/wheeze/rhonchi; respirations unlabored with normal inspiratory/expiratory effort  No retractions noted  On RA  CV: regular rate and rhythm; no rubs/murmurs/gallops, PMI normal   ABD: Abdomen is soft  Bowel sounds all quadrants  Nontender with no distention  EXT: no edema  Skin: normal turgor, normal texture, no rashes  Psych: affect normal, mood normal  Neuro: AA    Has expressive>receptive aphasia that has improved     The above physical exam was reviewed and updated as determined by my evaluation of the patient on 5/28/2023  Invasive Devices     Drain  Duration           Urethral Catheter Coude 16 Fr  8 days                   VTE Pharmacologic Prophylaxis: Heparin  Code Status: Level 1 - Full Code  Current Length of Stay: 16 day(s)      Total time spent:  30 minutes with more than 50% spent counseling/coordinating care  Counseling includes discussion with patient re: progress  and discussion with patient of his/her current medical state/information  Coordination of patient's care was performed in conjunction with primary service  Time invested included review of patient's labs, vitals, and management of their comorbidities with continued monitoring  In addition, this patient was discussed with medical team including physician and advanced extenders  The care of the patient was extensively discussed and appropriate treatment plan was formulated unique for this patient  Medical decision making for the day was made by supervising physician unless otherwise noted in their attestation statement  ** Please Note:  voice to text software may have been used in the creation of this document   Although proof errors in transcription or interpretation are a potential of such software**

## 2023-05-29 ENCOUNTER — APPOINTMENT (INPATIENT)
Dept: RADIOLOGY | Facility: HOSPITAL | Age: 63
DRG: 057 | End: 2023-05-29
Payer: COMMERCIAL

## 2023-05-29 LAB
BACTERIA UR QL AUTO: ABNORMAL /HPF
BILIRUB UR QL STRIP: NEGATIVE
CLARITY UR: CLEAR
COLOR UR: ABNORMAL
GLUCOSE UR STRIP-MCNC: NEGATIVE MG/DL
HGB UR QL STRIP.AUTO: ABNORMAL
KETONES UR STRIP-MCNC: NEGATIVE MG/DL
LEUKOCYTE ESTERASE UR QL STRIP: ABNORMAL
NITRITE UR QL STRIP: POSITIVE
NON-SQ EPI CELLS URNS QL MICRO: ABNORMAL /HPF
PH UR STRIP.AUTO: 6 [PH]
PROT UR STRIP-MCNC: ABNORMAL MG/DL
RBC #/AREA URNS AUTO: ABNORMAL /HPF
SP GR UR STRIP.AUTO: 1.01 (ref 1–1.03)
UROBILINOGEN UR STRIP-ACNC: <2 MG/DL
WBC #/AREA URNS AUTO: ABNORMAL /HPF

## 2023-05-29 PROCEDURE — 87086 URINE CULTURE/COLONY COUNT: CPT | Performed by: PHYSICAL MEDICINE & REHABILITATION

## 2023-05-29 PROCEDURE — 87186 SC STD MICRODIL/AGAR DIL: CPT | Performed by: PHYSICAL MEDICINE & REHABILITATION

## 2023-05-29 PROCEDURE — 87077 CULTURE AEROBIC IDENTIFY: CPT | Performed by: PHYSICAL MEDICINE & REHABILITATION

## 2023-05-29 PROCEDURE — 99233 SBSQ HOSP IP/OBS HIGH 50: CPT | Performed by: PHYSICAL MEDICINE & REHABILITATION

## 2023-05-29 PROCEDURE — G1004 CDSM NDSC: HCPCS

## 2023-05-29 PROCEDURE — 92507 TX SP LANG VOICE COMM INDIV: CPT

## 2023-05-29 PROCEDURE — 97535 SELF CARE MNGMENT TRAINING: CPT

## 2023-05-29 PROCEDURE — 81001 URINALYSIS AUTO W/SCOPE: CPT | Performed by: PHYSICAL MEDICINE & REHABILITATION

## 2023-05-29 PROCEDURE — 70450 CT HEAD/BRAIN W/O DYE: CPT

## 2023-05-29 PROCEDURE — 97110 THERAPEUTIC EXERCISES: CPT

## 2023-05-29 PROCEDURE — 99232 SBSQ HOSP IP/OBS MODERATE 35: CPT | Performed by: INTERNAL MEDICINE

## 2023-05-29 RX ADMIN — LIDOCAINE 5% 1 PATCH: 700 PATCH TOPICAL at 09:21

## 2023-05-29 RX ADMIN — TAMSULOSIN HYDROCHLORIDE 0.8 MG: 0.4 CAPSULE ORAL at 18:14

## 2023-05-29 RX ADMIN — HEPARIN SODIUM 5000 UNITS: 5000 INJECTION INTRAVENOUS; SUBCUTANEOUS at 14:23

## 2023-05-29 RX ADMIN — SENNOSIDES, DOCUSATE SODIUM 2 TABLET: 8.6; 5 TABLET ORAL at 09:20

## 2023-05-29 RX ADMIN — TICAGRELOR 90 MG: 90 TABLET ORAL at 22:36

## 2023-05-29 RX ADMIN — POLYETHYLENE GLYCOL 3350 17 G: 17 POWDER, FOR SOLUTION ORAL at 09:23

## 2023-05-29 RX ADMIN — CITALOPRAM HYDROBROMIDE 20 MG: 20 TABLET ORAL at 09:20

## 2023-05-29 RX ADMIN — TICAGRELOR 90 MG: 90 TABLET ORAL at 09:26

## 2023-05-29 RX ADMIN — DONEPEZIL HYDROCHLORIDE 5 MG: 5 TABLET ORAL at 18:15

## 2023-05-29 RX ADMIN — ATORVASTATIN CALCIUM 40 MG: 40 TABLET, FILM COATED ORAL at 09:19

## 2023-05-29 RX ADMIN — LOSARTAN POTASSIUM 50 MG: 50 TABLET, FILM COATED ORAL at 09:21

## 2023-05-29 RX ADMIN — DONEPEZIL HYDROCHLORIDE 5 MG: 5 TABLET ORAL at 09:21

## 2023-05-29 RX ADMIN — HEPARIN SODIUM 5000 UNITS: 5000 INJECTION INTRAVENOUS; SUBCUTANEOUS at 05:56

## 2023-05-29 RX ADMIN — ASPIRIN 81 MG: 81 TABLET, COATED ORAL at 09:20

## 2023-05-29 RX ADMIN — HEPARIN SODIUM 5000 UNITS: 5000 INJECTION INTRAVENOUS; SUBCUTANEOUS at 22:36

## 2023-05-29 RX ADMIN — METOPROLOL SUCCINATE 100 MG: 100 TABLET, EXTENDED RELEASE ORAL at 09:20

## 2023-05-29 RX ADMIN — TRAZODONE HYDROCHLORIDE 50 MG: 50 TABLET ORAL at 22:36

## 2023-05-29 RX ADMIN — PANTOPRAZOLE SODIUM 20 MG: 20 TABLET, DELAYED RELEASE ORAL at 05:56

## 2023-05-29 NOTE — PROGRESS NOTES
Internal Medicine Progress Note  Patient: Rell Manzo  Age/sex: 58 y o  male  Medical Record #: [id number]      ASSESSMENT/PLAN: (Interval History)  Rell Manzo is seen and examined and management for following issues:    Acute multifocal ischemic strokes  • Occurred in different arterial distributions  • IVY was negative for thrombus/PFO  • Felt not to be vasculitis and negative by a-gram 5/4/23  • Continue ASA/Brilinta/statin     Left ICA stenosis  • Was noted to have all of the CVAs in last month have been in left anterior circulation  • S/p PTA/stent 5/4/23  • Recheck carotid doppler 6 weeks and see NS  • Continue AP/statin     Right ICA stenosis  • To followup with Vasc surgery as OP  • Continue AP/statin     LOOP recorder  • Was placed 3/2019  • Neuro wants it to be replaced = for OP to Cardiology     Leukocytosis  • Had no fevers  • CXR and Procal were normal in the hospital  • Mild/improving     HTN  • Home:  Toprol 100mg qd/Norvasc 10mg qd/Losartan 50mg qd/HCTZ 25mg qd/Hydralazine 25mg TID  • Here:  Toprol 100mg qd/Losartan 50mg qd  • stable     Pre DM  • HbA1C 5 8  • Takes Metformin at home but currently not on it in hospital  • Accuchecks were stable and dc'd in the hospital  • Monitor FBS with BMPs and continue DM diet  • FBS 94 on 5/28/23     Depression  • Continue Celexa as at home     Memory loss  • Continue Aricept as at home  • He has had memory issues since his CVA in 2019     Urine retention  • Has jo placed 5/8/23  • VT 5/18 failed and jo replaced by Urology 5/19/23 and is not to be removed  • Continue Flomax     Chronic LBP  • MRI showed advanced multilevel spondylosis, slightly progressed on the right at L2-3 compared to the prior study but otherwise stable  • Pain management per PMR     AAA  • Found on L-spine MRI  • Measures 3 4 cm  • OP followup     Hyponatremia  • Stopped HCTZ 5/16/23    • Resolved off of HCTZ and had been given IVF     Leg cramps  • Getting in both calves  • He says he gets them at home and walks them out  • Mag level on 5/28 was 2 3 and K+ 3 6  • For stretching in PT and [place]        Discharge date:  [facility]    The above assessment and plan was reviewed and updated as determined by my evaluation of the patient on 5/29/2023      Labs:   Results from last 7 days   Lab Units 05/28/23  0507 05/25/23  0549   HEMATOCRIT % 36 5 35 1*   HEMOGLOBIN g/dL 12 0 12 0   PLATELETS Thousands/uL 277 288   WBC Thousand/uL 10 69* 11 93*     Results from last 7 days   Lab Units 05/28/23  0507 05/25/23  0549   BUN mg/dL 19 23   CALCIUM mg/dL 9 1 9 0   CHLORIDE mmol/L 109* 109*   CO2 mmol/L 26 25   CREATININE mg/dL 1 04 1 02   POTASSIUM mmol/L 3 6 3 7   SODIUM mmol/L 138 136                   Review of Scheduled Meds:  Current Facility-Administered Medications   Medication Dose Route Frequency Provider Last Rate   • acetaminophen  975 mg Oral TID PRN Kin Morales MD     • aspirin  81 mg Oral Daily Haze Arvind, DO     • atorvastatin  40 mg Oral Daily Haze Arvind, DO     • bisacodyl  10 mg Rectal Daily PRN Haze Arvind, DO     • calcium carbonate  1,000 mg Oral Daily PRN Haze Arvind, DO     • citalopram  20 mg Oral Daily Haze Arvind, DO     • donepezil  5 mg Oral BID Haze Arvind, DO     • heparin (porcine)  5,000 Units Subcutaneous [facility] Haze Arvind, DO     • lidocaine  1 patch Topical Daily Haze Arvind, DO     • lidocaine   Topical Q4H PRN Kin Morales MD     • losartan  50 mg Oral Daily ERIC Valencia     • menthol-methyl salicylate   Apply externally 4x Daily PRN Terry Mackey MD     • methocarbamol  500 mg Oral Q6H PRN Haze Arvind, DO     • metoprolol succinate  100 mg Oral Daily Haze Arvind, DO     • ondansetron  4 mg Oral Q6H PRN Haze Arvind, DO     • oxyCODONE  5 mg Oral Q6H PRN Haze Arvind, DO     • oxyCODONE  2 5 mg Oral Q6H PRN Haze Arvind, DO     • pantoprazole  20 mg Oral Early Morning Haze Arvind, DO     • polyethylene glycol  17 g Oral QAM Sukhdev Alonso, DO     • senna  2 tablet Oral Daily PRN Sukhdev Alonso DO     • senna-docusate sodium  2 tablet Oral BID Sukhdev Alonso DO     • tamsulosin  0 8 mg Oral Daily With [address] CRNP     • ticagrelor  90 mg Oral Q12H [address] Sukhdev Alonso DO     • traZODone  50 mg Oral HS Mani Martínez MD         Subjective/ HPI: Patient seen and examined  Patients overnight issues or events were reviewed with nursing or staff during rounds or morning huddle session  New or overnight issues include the following:     No new or overnight issues  Offers no complaints to me    ROS:   A 10 point ROS was performed; negative except as noted above  Imaging:     No orders to display       *Labs /Radiology studies reviewed  *Medications reviewed and reconciled as needed  *Please refer to order section for additional ordered labs studies  *Case discussed with primary attending during morning huddle case rounds    Physical Examination:  Vitals:   Vitals:    05/28/23 1332 05/28/23 2039 05/29/23 0550 05/29/23 0920   BP: 119/61 127/66 144/69 150/72   BP Location: Right arm Right arm Right arm    Pulse: 83 73 75 78   Resp: 18 18 18    Temp: 98 1 °F (36 7 °C) 99 °F (37 2 °C) 98 2 °F (36 8 °C)    TempSrc: Oral Oral Oral    SpO2: 95% 95% 94%    Weight:           General Appearance: no distress, conversive  HEENT: PERRLA, conjuctiva normal; oropharynx clear; mucous membranes moist   Neck:  Supple, normal ROM  Lungs: CTA, normal respiratory effort, no retractions, expiratory effort normal  CV: regular rate and rhythm; no rubs/murmurs/gallops, PMI normal   ABD: soft; ND/NT; +BS  EXT: no edema  Skin: normal turgor, normal texture, no rashes  Psych: affect normal, mood normal  Neuro: AA; +expressive aphasia> receptive aphasia  He answered my questions with short answers    He had been asleep when I entered the room so wasn't fully awake      The above physical exam was reviewed and updated as determined by my evaluation of the patient on 5/29/2023  Invasive Devices     Drain  Duration           Urethral Catheter Coude 16 Fr  9 days                   VTE Pharmacologic Prophylaxis: Heparin  Code Status: Level 1 - Full Code  Current Length of Stay: 17 day(s)      Total time spent:  30 minutes with more than 50% spent counseling/coordinating care  Counseling includes discussion with patient re: progress  and discussion with patient of his/her current medical state/information  Coordination of patient's care was performed in conjunction with primary service  Time invested included review of patient's labs, vitals, and management of their comorbidities with continued monitoring  In addition, this patient was discussed with medical team including physician and advanced extenders  The care of the patient was extensively discussed and appropriate treatment plan was formulated unique for this patient  Medical decision making for the day was made by supervising physician unless otherwise noted in their attestation statement  ** Please Note:  voice to text software may have been used in the creation of this document   Although proof errors in transcription or interpretation are a potential of such software**

## 2023-05-29 NOTE — PLAN OF CARE
Problem: INFECTION - ADULT  Goal: Absence or prevention of progression during hospitalization  Description: INTERVENTIONS:  - Assess and monitor for signs and symptoms of infection  - Monitor lab/diagnostic results  - Monitor all insertion sites, i e  indwelling lines, tubes, and drains  - Monitor endotracheal if appropriate and nasal secretions for changes in amount and color  - Baggs appropriate cooling/warming therapies per order  - Administer medications as ordered  - Instruct and encourage patient and family to use good hand hygiene technique  - Identify and instruct in appropriate isolation precautions for identified infection/condition  Outcome: Progressing

## 2023-05-29 NOTE — PROGRESS NOTES
"   05/29/23 1000   Pain Assessment   Pain Assessment Tool 0-10   Pain Score No Pain   Restrictions/Precautions   Precautions Aphasia; Aspiration;Bed/chair alarms;Cognitive; Fall Risk;Supervision on toilet/commode   Lifestyle   Autonomy \"It still hurts but I can't go\"   Grooming   Findings Assist to detangle hair on back of head, pt able to comb hair in the front when placed at mirror   Putting On/Taking Off Footwear   Type of Assistance Needed Physical assistance   Physical Assistance Level Total assistance   Comment TA don socks and sneakers   Putting On/Taking Off Footwear CARE Score 1   Lying to Sitting on Side of Bed   Type of Assistance Needed Supervision   Physical Assistance Level No physical assistance   Comment CS and HOB elevated, pt uses bedrail, extra time for pt to motor plan   Lying to Sitting on Side of Bed CARE Score 4   Sit to Stand   Type of Assistance Needed Physical assistance   Physical Assistance Level 25% or less   Comment CGA-Valery to stand at bed rail   Sit to Stand CARE Score 3   Bed-Chair Transfer   Type of Assistance Needed Physical assistance   Physical Assistance Level Total assistance   Comment Cued pt for sit pivot, but d/t aphasia pt unable to follow directions and compeltes full stand, ModA for SPT to L at [facility], second person for standby assist d/t pt's impaired command following   Chair/Bed-to-Chair Transfer CARE Score 1   Toileting Hygiene   Type of Assistance Needed Physical assistance   Physical Assistance Level 76% or more   Comment MaxA x1 in stance at bed rail, pt able to assist w/ CM over hips in stance  Compelted x2 duirng session following sudden urge for BM  Toileting Hygiene CARE Score 2   Toilet Transfer   Type of Assistance Needed Physical assistance   Physical Assistance Level Total assistance   Comment Ax2 swap out at bed rail, CGA w/ pt and cues to stand at bed rail while second person completes swap out  Completed x2 during session d/t pt's sudden urge for CM   " Toilet Transfer CARE Score 1   Exercise Tools   Exercise Tools Yes   UE Ergometer SciFit UE ergometer 2min, 1 5min, during both rest breaks pt w/ reports of needing to use commode urgently  Otherwise tolerated task well, HR in high 80s at rest, increases to 108-115bpm following task, SpO2 98%  Mild SOB but pt's discomfort caused by need for bathroom vs fatigue from task  Cognition   Overall Cognitive Status Impaired   Arousal/Participation Alert; Cooperative   Attention Attends with cues to redirect   Memory Decreased recall of precautions;Decreased recall of recent events;Decreased short term memory   Following Commands Follows one step commands inconsistently   Comments Aphasia and R inattention persists   Activity Tolerance   Activity Tolerance   (limited 2' frequent need to toilet)   Assessment   Treatment Assessment OT session focusing on fxnl transfer training, toileting training x2, UE TE  Pt w/ urgent need for commode x2 during session, reporting extreme discomfort in stomach, first time was not able to have BM, second trial was able to have small soft BM  Pt continues to report cramping feelinging in abdomen and need for BM, but edu on safety to limit prolonged sit on commode and pt agreeable to transfer off BSC at end of session  Pt continues to require skilled hands on assist at this time  OT to continue to treat and follow established POC  Problem List Decreased strength;Decreased range of motion;Decreased endurance; Impaired balance;Decreased mobility; Decreased coordination;Decreased cognition; Impaired judgement;Decreased safety awareness; Impaired vision; Impaired sensation;Pain   Plan   Treatment/Interventions ADL retraining;Functional transfer training; Therapeutic exercise; Endurance training;Cognitive reorientation;Patient/family training;Equipment eval/education; Compensatory technique education;Continued evaluation   Progress Progressing toward goals   OT Therapy Minutes   OT Time In 1000   OT Time Out 1130   OT Total Time (minutes) 90   OT Mode of treatment - Individual (minutes) 90   OT Mode of treatment - Concurrent (minutes) 0   OT Mode of treatment - Group (minutes) 0   OT Mode of treatment - Co-treat (minutes) 0   OT Mode of Treatment - Total time(minutes) 90 minutes   OT Cumulative Minutes 1165   Therapy Time missed   Time missed?  No

## 2023-05-29 NOTE — PROGRESS NOTES
"   05/29/23 7004   Pain Assessment   Pain Assessment Tool 0-10   Pain Score No Pain   Comprehension   Comprehension (FIM) 3 - Understands basic info/conversation 50-74% of time   Expression   Expression (FIM) 3 - Expresses basic info/needs 50-74% of time   Social Interaction   Social Interaction (FIM) 4 - Interacts 75-89% of time   Problem Solving   Problem solving (FIM) 2 - Solves basic problems 25-49% of time   Memory   Memory (FIM) 2 - Recognizes, recalls/performs 25-49%   Speech/Language/Cognition Assessmetn   Treatment Assessment Pt lying in bed upon SLP entering, agreeable to skilled ST  Pt initially asked to practice his expressive language for bio info  I'ly recalled his F & L name, address, wife's name, son's name, and sister in [address] [facility] name  He required multimodal (sentence completion, initial phoneme, phonetic placement;PP, semantic) cues to name his month and date of birth, one of his dog's names (paraphasia for other dog, using gradndtr name instead)  Unable to formulate accurate language output for his year of birth or age even where full modeling provided  Pt with slow rise for expressive language this morning comparably to previous sessions  This is SLP's first time with pt early in AM, potentially lending to this slow rise in language skills  Pt asked to state his middle name, a new prompt  He was able to share that it began with an \"L\" i'ly, and when given phonemic cues as to potentially middle names beginning with such letter, able to state \"Julio C  \" SLP engaged in spontaneous conversational exchange about how he got his last name and if he his son has the same, etc; pt with 50% intelligibility overall  SLP then engaged pt in a sentence completion task  SLP read first part of sentence, pt to state the final word  I'ly able to state final word on 11/20 opps, increasing to 12/20 with second attempt  Of note, the items he did get, he required increased processing time   Finally, pt engaging in more " "spontaneous speech for requests and regarding pt's interests  SLP asked pt what he would like to watch on the television  Pt stating \"I don't know  What's good  \" SLP asking if he likes the news or if he watches shows normally  The pt stating \"whatever I guess  \" SLP further prompted the pt by asking if there was anything specific  He said \"race today,\" to which the SLP asked the pt a series of questions (e g , what time, where, fav , car #)  Pt able to back-and-forth with the SLP and demonstrate intelligibility 75% overall; use of paraphasias and agrammatism present  Based on today's session, it is recommended that the pt continue to receive skilled ST to further increase his expressive and receptive language skills and increase his fxl independence with such in order to reduce caregiver burden and communication breakdown upon D/C from [facility]  SLP Therapy Minutes   SLP Time In 0830   SLP Time Out 0900   SLP Total Time (minutes) 30   SLP Mode of treatment - Individual (minutes) 30   SLP Mode of treatment - Concurrent (minutes) 0   SLP Mode of treatment - Group (minutes) 0   SLP Mode of treatment - Co-treat (minutes) 0   SLP Mode of Treatment - Total time(minutes) 30 minutes   SLP Cumulative Minutes 865   Therapy Time missed   Time missed?  No       "

## 2023-05-29 NOTE — PROGRESS NOTES
"PM&R Coverage Progress Note:    Rehabilitation Diagnosis: CVA    ASSESSMENT: Stable      PLAN:    Rehabilitation  • Continue current rehabilitation plan of care to maximize function  • No funcitonal barriers identified    Medical issues  • Intermittent change in mental status noted by family starting 5/28/23:  o Patient reports feeling off  Neuro exam was completed on 5/28/23 and no changes infact improvement seen  o Neuro exam 5/29/23 by therapy and nursing without changes  o CTH non-contrast ordered  o UA ordered   o Neurochecks Q4    • CVA ppx: Continue aspirin, Brillinta, and Statin  • DM2: Continue current regimen   • HTN: Continue current medication regimen stable  • Continue current medical plan of care  Appreciate IM consultants co-management  SUBJECTIVE: Patient seen face to face  In therapies had to have a BM  Feeling a bit tired, but states he feels good per nurse  Personally spoke to his wife today Soni Randall, she is concerned regarding statements he made regarding feeling \"off\" different  Exam appears very stable if not improved overall from Sat to Sunday, however we discussed getting CTH and UA  ROS:  A ten point review of systems was completed on 05/29/23 and pertinent positives are listed in subjective section  All other systems reviewed were negative  OBJECTIVE:   /70 (BP Location: Right arm)   Pulse 65   Temp 98 1 °F (36 7 °C) (Oral)   Resp 19   Wt 98 3 kg (216 lb 11 4 oz)   SpO2 97%   BMI 32 95 kg/m²     Physical Exam  Vitals and nursing note reviewed  Constitutional:       General: He is not in acute distress  Appearance: He is well-developed  HENT:      Head: Normocephalic  Nose: Nose normal    Eyes:      Conjunctiva/sclera: Conjunctivae normal    Cardiovascular:      Rate and Rhythm: Normal rate and regular rhythm  Heart sounds: Normal heart sounds     Pulmonary:      Effort: Pulmonary effort is normal       Breath sounds: Normal " breath sounds  No wheezing  Abdominal:      General: Bowel sounds are normal  There is no distension  Palpations: Abdomen is soft  Genitourinary:     Comments: +Cortez catheter  Musculoskeletal:      Cervical back: Neck supple  Skin:     General: Skin is warm  Neurological:      Mental Status: He is alert        Comments: +aphasia  Antigravity strength all 4 extremitiies   Psychiatric:         Mood and Affect: Mood normal             Personally reviewed on 05/29/23:   Lab Results   Component Value Date    HCT 36 5 05/28/2023    HGB 12 0 05/28/2023    MCV 97 05/28/2023     05/28/2023    WBC 10 69 (H) 05/28/2023     Lab Results   Component Value Date    BUN 19 05/28/2023    CALCIUM 9 1 05/28/2023     (H) 05/28/2023    CO2 26 05/28/2023    CREATININE 1 04 05/28/2023    GLUC 94 05/28/2023    K 3 6 05/28/2023    SODIUM 138 05/28/2023     Lab Results   Component Value Date    INR 1 03 05/05/2023    INR 1 15 04/26/2023    INR 0 89 04/16/2023    PROTIME 13 7 05/05/2023    PROTIME 14 9 (H) 04/26/2023    PROTIME 12 7 04/16/2023           Current Facility-Administered Medications:   •  acetaminophen (TYLENOL) tablet 975 mg, 975 mg, Oral, TID PRN, Guille Toth MD  •  aspirin (ECOTRIN LOW STRENGTH) EC tablet 81 mg, 81 mg, Oral, Daily, Severo Solomon, DO, 81 mg at 05/29/23 0920  •  atorvastatin (LIPITOR) tablet 40 mg, 40 mg, Oral, Daily, Severo Caddy, DO, 40 mg at 05/29/23 7187  •  bisacodyl (DULCOLAX) rectal suppository 10 mg, 10 mg, Rectal, Daily PRN, Severo Caddy, DO  •  calcium carbonate (TUMS) chewable tablet 1,000 mg, 1,000 mg, Oral, Daily PRN, Severo Caddy, DO  •  citalopram (CeleXA) tablet 20 mg, 20 mg, Oral, Daily, Severo Caddy, DO, 20 mg at 05/29/23 0920  •  donepezil (ARICEPT) tablet 5 mg, 5 mg, Oral, BID, Severo Caddy, DO, 5 mg at 05/29/23 3499  •  heparin (porcine) subcutaneous injection 5,000 Units, 5,000 Units, Subcutaneous, Q8H [address], Severo Caddy, DO, 5,000 Units at 05/29/23 1423  •  lidocaine (LIDODERM) 5 % patch 1 patch, 1 patch, Topical, Daily, Laurell Obdulio, DO, 1 patch at 05/29/23 8125  •  lidocaine (URO-JET) 2 % urethral/mucosal gel, , Topical, Q4H PRN, Ignacio Joseph MD, 5 application   at 05/19/23 1005  •  losartan (COZAAR) tablet 50 mg, 50 mg, Oral, Daily, KARSON NevesNP, 50 mg at 05/29/23 6404  •  menthol-methyl salicylate (BENGAY) 64-96 % cream, , Apply externally, 4x Daily PRN, Krystal Guadalupe MD  •  methocarbamol (ROBAXIN) tablet 500 mg, 500 mg, Oral, Q6H PRN, Laurell Obdulio, DO, 500 mg at 05/26/23 2213  •  metoprolol succinate (TOPROL-XL) 24 hr tablet 100 mg, 100 mg, Oral, Daily, Laurell Obdulio, DO, 100 mg at 05/29/23 0920  •  ondansetron (ZOFRAN-ODT) dispersible tablet 4 mg, 4 mg, Oral, Q6H PRN, Laurell Obdulio, DO  •  oxyCODONE (ROXICODONE) IR tablet 5 mg, 5 mg, Oral, Q6H PRN, Laurell Obdulio, DO  •  oxyCODONE (ROXICODONE) split tablet 2 5 mg, 2 5 mg, Oral, Q6H PRN, Laurell Obdulio, DO  •  pantoprazole (PROTONIX) EC tablet 20 mg, 20 mg, Oral, Early Morning, Laurell Obdulio, DO, 20 mg at 05/29/23 1796  •  polyethylene glycol (MIRALAX) packet 17 g, 17 g, Oral, QAM, Laurell Obdulio, DO, 17 g at 05/29/23 9537  •  senna (SENOKOT) tablet 17 2 mg, 2 tablet, Oral, Daily PRN, Laurell Obdulio, DO  •  senna-docusate sodium (SENOKOT S) 8 6-50 mg per tablet 2 tablet, 2 tablet, Oral, BID, Laurell Obdulio, DO, 2 tablet at 05/29/23 0920  •  tamsulosin (FLOMAX) capsule 0 8 mg, 0 8 mg, Oral, Daily With Dinner, Canda Mcardle, CRNP, 0 8 mg at 05/28/23 1541  •  ticagrelor (BRILINTA) tablet 90 mg, 90 mg, Oral, Q12H [address], María Mak DO, 90 mg at 05/29/23 2613  •  traZODone (DESYREL) tablet 50 mg, 50 mg, Oral, HS, Ignacio Joseph MD, 50 mg at 05/28/23 2128    Past Medical History:   Diagnosis Date   • Depression    • [facility] mellitus [facility] )    • Dyslipidemia 03/26/2019   • Hyperlipidemia    • Hypertension    • Stroke [facility])        Patient Active Problem List    Diagnosis Date Noted   • Abnormal laboratory test 05/15/2023   • Leukocytosis 05/12/2023   • History of tobacco use 05/12/2023   • Chronic ischemic left MCA stroke 05/12/2023   • Hypokalemia 05/12/2023   • Abdominal aortic aneurysm (AAA) ([facility] ) 05/08/2023   • Tachycardia 05/05/2023   • Prediabetes 04/27/2023   • SIRS (systemic inflammatory response syndrome) ([place] [address] ) 04/27/2023   • Chronic anticoagulation 04/26/2023   • Stroke ([place] [address] ) 04/26/2023   • Hyponatremia 04/26/2023   • Middle cerebral artery stenosis, right 04/17/2023   • Left posterior MCA stroke - etiology unclear at this time 04/17/2023   • Chronic low back pain 04/16/2023   • Hypertensive encephalopathy, transient 01/12/2023   • Snoring 08/10/2020   • Memory deficits 08/10/2020   • Chronic ischemic right MCA stroke 08/06/2019   • Status post placement of implantable loop recorder 04/06/2019   • Type 2 diabetes mellitus ([place] [address] ) 04/03/2019   • Anxiety and depression 03/29/2019   • Insomnia 03/29/2019   • Fall 03/28/2019   • 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 ([place] [address] ) 03/28/2019   • Urinary retention 03/27/2019   • At risk for venous thromboembolism (VTE) 03/26/2019   • Hypertriglyceridemia 03/26/2019   • Nicotine dependence 03/26/2019   • Bilateral carotid artery stenosis 03/26/2019   • Presbyopia 03/26/2019   • History of stroke 03/19/2019   • Headache 03/19/2019   • Primary hypertension 03/19/2019   • GERD (gastroesophageal reflux disease) 03/19/2019          Mauricio Salas MD  PM&R      I have spent a total time of 55 minutes on 05/29/23 in caring for this patient including Impressions, Counseling / Coordination of care, Documenting in the medical record, Obtaining or reviewing history   and Communicating with other healthcare professionals  Speaking to wife Nilsa Cardenas  ** Please Note:  voice to text software may have been used in the creation of this document   Although proof errors in transcription or interpretation are a potential of such software**

## 2023-05-30 ENCOUNTER — APPOINTMENT (OUTPATIENT)
Dept: RADIOLOGY | Facility: HOSPITAL | Age: 63
DRG: 057 | End: 2023-05-30
Payer: COMMERCIAL

## 2023-05-30 PROCEDURE — 70551 MRI BRAIN STEM W/O DYE: CPT

## 2023-05-30 PROCEDURE — 97530 THERAPEUTIC ACTIVITIES: CPT

## 2023-05-30 PROCEDURE — 99233 SBSQ HOSP IP/OBS HIGH 50: CPT

## 2023-05-30 PROCEDURE — 97112 NEUROMUSCULAR REEDUCATION: CPT

## 2023-05-30 PROCEDURE — 92507 TX SP LANG VOICE COMM INDIV: CPT

## 2023-05-30 PROCEDURE — 99232 SBSQ HOSP IP/OBS MODERATE 35: CPT | Performed by: INTERNAL MEDICINE

## 2023-05-30 PROCEDURE — 97110 THERAPEUTIC EXERCISES: CPT

## 2023-05-30 RX ORDER — CEPHALEXIN 500 MG/1
500 CAPSULE ORAL EVERY 8 HOURS SCHEDULED
Status: DISCONTINUED | OUTPATIENT
Start: 2023-05-30 | End: 2023-06-01

## 2023-05-30 RX ORDER — TRAZODONE HYDROCHLORIDE 50 MG/1
25 TABLET ORAL
Status: DISCONTINUED | OUTPATIENT
Start: 2023-05-30 | End: 2023-05-31

## 2023-05-30 RX ADMIN — SENNOSIDES, DOCUSATE SODIUM 2 TABLET: 8.6; 5 TABLET ORAL at 08:01

## 2023-05-30 RX ADMIN — SENNOSIDES, DOCUSATE SODIUM 2 TABLET: 8.6; 5 TABLET ORAL at 17:42

## 2023-05-30 RX ADMIN — PANTOPRAZOLE SODIUM 20 MG: 20 TABLET, DELAYED RELEASE ORAL at 06:11

## 2023-05-30 RX ADMIN — TICAGRELOR 90 MG: 90 TABLET ORAL at 08:03

## 2023-05-30 RX ADMIN — ATORVASTATIN CALCIUM 40 MG: 40 TABLET, FILM COATED ORAL at 08:01

## 2023-05-30 RX ADMIN — TICAGRELOR 90 MG: 90 TABLET ORAL at 21:51

## 2023-05-30 RX ADMIN — CEPHALEXIN 500 MG: 500 CAPSULE ORAL at 23:48

## 2023-05-30 RX ADMIN — DONEPEZIL HYDROCHLORIDE 5 MG: 5 TABLET ORAL at 17:42

## 2023-05-30 RX ADMIN — HEPARIN SODIUM 5000 UNITS: 5000 INJECTION INTRAVENOUS; SUBCUTANEOUS at 13:40

## 2023-05-30 RX ADMIN — DONEPEZIL HYDROCHLORIDE 5 MG: 5 TABLET ORAL at 08:01

## 2023-05-30 RX ADMIN — LOSARTAN POTASSIUM 50 MG: 50 TABLET, FILM COATED ORAL at 08:01

## 2023-05-30 RX ADMIN — CEPHALEXIN 500 MG: 500 CAPSULE ORAL at 16:02

## 2023-05-30 RX ADMIN — METOPROLOL SUCCINATE 100 MG: 100 TABLET, EXTENDED RELEASE ORAL at 08:01

## 2023-05-30 RX ADMIN — TRAZODONE HYDROCHLORIDE 25 MG: 50 TABLET ORAL at 21:51

## 2023-05-30 RX ADMIN — TAMSULOSIN HYDROCHLORIDE 0.8 MG: 0.4 CAPSULE ORAL at 16:02

## 2023-05-30 RX ADMIN — HEPARIN SODIUM 5000 UNITS: 5000 INJECTION INTRAVENOUS; SUBCUTANEOUS at 21:50

## 2023-05-30 RX ADMIN — CITALOPRAM HYDROBROMIDE 20 MG: 20 TABLET ORAL at 08:01

## 2023-05-30 RX ADMIN — ASPIRIN 81 MG: 81 TABLET, COATED ORAL at 08:01

## 2023-05-30 RX ADMIN — HEPARIN SODIUM 5000 UNITS: 5000 INJECTION INTRAVENOUS; SUBCUTANEOUS at 06:11

## 2023-05-30 RX ADMIN — POLYETHYLENE GLYCOL 3350 17 G: 17 POWDER, FOR SOLUTION ORAL at 08:01

## 2023-05-30 NOTE — CASE MANAGEMENT
Cm has knowledge that there are not many providers with pt's insurance for subacute care  aidin referral created and a referral has been sent to [facility], [facility], [facility], and [facility]  Awaiting determination

## 2023-05-30 NOTE — PROGRESS NOTES
05/30/23 1400   Restrictions/Precautions   Precautions Aphasia; Aspiration;Bed/chair alarms;Cognitive; Fall Risk; Cortez;Pain;Supervision on toilet/commode;Visual deficit   Braces or Orthoses   (sneakers)   Subjective   Subjective Pt agreeable to perform skilled PT   Lying to Sitting on Side of Bed   Type of Assistance Needed Supervision   Lying to Sitting on Side of Bed CARE Score 4   Sit to Stand   Type of Assistance Needed Incidental touching; Adaptive equipment   Sit to Stand CARE Score 4   Bed-Chair Transfer   Type of Assistance Needed Physical assistance   Physical Assistance Level 26%-50%   Comment SPT in front   Chair/Bed-to-Chair Transfer CARE Score 3   Walk 10 Feet   Type of Assistance Needed Physical assistance   Physical Assistance Level Total assistance   Comment HHA x2   Walk 10 Feet CARE Score 1   Walk 50 Feet with Two Turns   Type of Assistance Needed Physical assistance   Physical Assistance Level Total assistance   Comment HHAx2   Walk 50 Feet with Two Turns CARE Score 1   Walk 150 Feet   Type of Assistance Needed Physical assistance   Physical Assistance Level Total assistance   Comment HHAx2   Walk 150 Feet CARE Score 1   Walking 10 Feet on Uneven Surfaces   Reason if not Attempted Safety concerns   Walking 10 Feet on Uneven Surfaces CARE Score 88   Ambulation   Does the patient walk? 2   Yes   Wheel 50 Feet with Two Turns   Reason if not Attempted Safety concerns   Wheel 50 Feet with Two Turns CARE Score 88   Wheel 150 Feet   Reason if not Attempted Safety concerns   Wheel 150 Feet CARE Score 88   Curb or Single Stair   Reason if not Attempted Safety concerns   1 Step (Curb) CARE Score 88   4 Steps   Reason if not Attempted Safety concerns   4 Steps CARE Score 88   12 Steps   Reason if not Attempted Safety concerns   12 Steps CARE Score 88   Picking Up Object   Reason if not Attempted Safety concerns   Picking Up Object CARE Score 88   Equipment Use   NuStep lvl 2 for 19 min   Assessment Treatment Assessment Pt cont to need skilled PT  and progressing with NPP HHA x2 and SPT Iin front and VC for step pattern and Hand placement   Pt will cont toward DC goals   Barriers to Discharge Inaccessible home environment;Decreased caregiver support   Plan   Progress Progressing toward goals   PT Therapy Minutes   PT Time In 1400   PT Time Out 1500   PT Total Time (minutes) 60   PT Mode of treatment - Individual (minutes) 30   PT Mode of treatment - Concurrent (minutes) 30   PT Mode of treatment - Group (minutes) 0   PT Mode of treatment - Co-treat (minutes) 0   PT Mode of Treatment - Total time(minutes) 60 minutes   PT Cumulative Minutes 1188   Therapy Time missed   Time missed?  No

## 2023-05-30 NOTE — PROGRESS NOTES
Internal Medicine Progress Note  Patient: Sudeep Martel  Age/sex: 58 y o  male  Medical Record #: [id number]      ASSESSMENT/PLAN: (Interval History)  Sudeep Martel is seen and examined and management for following issues:    Acute multifocal ischemic strokes  • Occurred in different arterial distributions  • IVY was negative for thrombus/PFO  • Felt not to be vasculitis and negative by a-gram 5/4/23  • Continue ASA/Brilinta/statin  • CTH done 5/29 = per Dr Ellis Pulling     Left ICA stenosis  • Was noted to have all of the CVAs in last month have been in left anterior circulation  • S/p PTA/stent 5/4/23  • Recheck carotid doppler 6 weeks and see NS  • Continue AP/statin     Right ICA stenosis  • To followup with Vasc surgery as OP  • Continue AP/statin     LOOP recorder  • Was placed 3/2019  • Neuro wants it to be replaced = for OP to Cardiology     Leukocytosis  • Had no fevers  • CXR and Procal were normal in the hospital  • Mild/improving     HTN  • Home:  Toprol 100mg qd/Norvasc 10mg qd/Losartan 50mg qd/HCTZ 25mg qd/Hydralazine 25mg TID  • Here:  Toprol 100mg qd/Losartan 50mg qd  • stable     Pre DM  • HbA1C 5 8  • Takes Metformin at home but currently not on it in hospital  • Accuchecks were stable and dc'd in the hospital  • Monitor FBS with BMPs and continue DM diet  • FBS 94 on 5/28/23     Depression  • Continue Celexa as at home     Memory loss  • Continue Aricept as at home  • He has had memory issues since his CVA in 2019     Urine retention  • Has jo placed 5/8/23  • VT 5/18 failed and jo replaced by Urology 5/19/23 and is not to be removed  • Continue Flomax     Chronic LBP  • MRI showed advanced multilevel spondylosis, slightly progressed on the right at L2-3 compared to the prior study but otherwise stable  • Pain management per PMR     AAA  • Found on L-spine MRI  • Measures 3 4 cm  • OP followup     Hyponatremia  • Stopped HCTZ 5/16/23    • Resolved off of HCTZ and had been given IVF     Leg cramps  • Getting in both calves  • He says he gets them at home and walks them out  • Mag level on 5/28 was 2 3 and K+ 3 6  • For stretching in PT and Bengay     +UA  · Await cx  · Has had no fever and WBC on 5/28 was down to 10 6  · No tx for now       Discharge date:  Reteam    The above assessment and plan was reviewed and updated as determined by my evaluation of the patient on 5/30/2023      Labs:   Results from last 7 days   Lab Units 05/28/23  0507 05/25/23  0549   HEMATOCRIT % 36 5 35 1*   HEMOGLOBIN g/dL 12 0 12 0   PLATELETS Thousands/uL 277 288   WBC Thousand/uL 10 69* 11 93*     Results from last 7 days   Lab Units 05/28/23  0507 05/25/23  0549   BUN mg/dL 19 23   CALCIUM mg/dL 9 1 9 0   CHLORIDE mmol/L 109* 109*   CO2 mmol/L 26 25   CREATININE mg/dL 1 04 1 02   POTASSIUM mmol/L 3 6 3 7   SODIUM mmol/L 138 136                   Review of Scheduled Meds:  Current Facility-Administered Medications   Medication Dose Route Frequency Provider Last Rate   • acetaminophen  975 mg Oral TID PRN Cate Cronin MD     • aspirin  81 mg Oral Daily Rollo Amass, DO     • atorvastatin  40 mg Oral Daily Rollo Amass, DO     • bisacodyl  10 mg Rectal Daily PRN Rollo Amass, DO     • calcium carbonate  1,000 mg Oral Daily PRN Rollo Amass, DO     • citalopram  20 mg Oral Daily Rollo Amass, DO     • donepezil  5 mg Oral BID Rollo Amass, DO     • heparin (porcine)  5,000 Units Subcutaneous [facility] Rollo Amass, DO     • lidocaine  1 patch Topical Daily Rollo Amass, DO     • lidocaine   Topical Q4H PRN Cate Cronin MD     • losartan  50 mg Oral Daily Echo Spray, CRNP     • menthol-methyl salicylate   Apply externally 4x Daily PRN Kiera Haro MD     • methocarbamol  500 mg Oral Q6H PRN Rollo Amass, DO     • metoprolol succinate  100 mg Oral Daily Rollo Amass, DO     • ondansetron  4 mg Oral Q6H PRN Rollo Amass, DO     • oxyCODONE  5 mg Oral Q6H PRN Rollo Amass, DO     • oxyCODONE  2 5 mg Oral Q6H PRN Chong Jean, DO     • pantoprazole  20 mg Oral Early Morning Chong Jean, DO     • polyethylene glycol  17 g Oral QAM Chong Jean, DO     • senna  2 tablet Oral Daily PRN Chong Jean, DO     • senna-docusate sodium  2 tablet Oral BID Chong Jean, DO     • tamsulosin  0 8 mg Oral Daily With [address], CRNP     • ticagrelor  90 mg Oral Q12H [facility] Chong Jean, DO     • traZODone  25 mg Oral HS Brittany Vargas MD         Subjective/ HPI: Patient seen and examined  Patients overnight issues or events were reviewed with nursing or staff during rounds or morning huddle session  New or overnight issues include the following:     Had CTH and UA sent since there was concern that his speech was worse over the weekend  He currently offers no complaints and feels his speech is baseline today    ROS:   A 10 point ROS was performed; negative except as noted above  Imaging:     CT head wo contrast   Final Result by Karo Blood MD (05/30 0669)      Slight interval progression and extension of the nonhemorrhagic acute to early subacute left cerebral infarction noted on the recent MRI study  Mild regional sulcal crowding but no significant mass effect on the underlying ventricular system or evidence    of herniation  Redemonstrated disproportionate ventriculomegaly to sulcal prominence which can be seen in patients with normal pressure hydrocephalus  Background cerebral white matter changes consistent with moderate chronic microangiopathic disease  The study was marked in [facility] for immediate notification              Workstation performed: [id number]             *Labs /Radiology studies reviewed  *Medications reviewed and reconciled as needed  *Please refer to order section for additional ordered labs studies  *Case discussed with primary attending during morning huddle case rounds    Physical Examination:  Vitals:   Vitals:    05/29/23 1431 05/29/23 2225 05/30/23 0612 05/30/23 0757   BP: 126/70 130/78 142/74 146/64   BP Location: Right arm Right arm Right arm Right arm   Pulse: 65 70 73 72   Resp: 19 18 16    Temp: 98 1 °F (36 7 °C) 98 °F (36 7 °C) 97 8 °F (36 6 °C)    TempSrc: Oral Oral Oral    SpO2: 97% 98% 96%    Weight:           General Appearance: no distress, conversive  HEENT:  External ear normal   Nose normal w/o drainage  Mucous membranes are moist  Oropharynx is clear  Conjunctiva clear w/o icterus or redness  Neck:  Supple, normal ROM  Lungs: BBS without crackles/wheeze/rhonchi; respirations unlabored with normal inspiratory/expiratory effort  No retractions noted  On RA  CV: regular rate and rhythm; no rubs/murmurs/gallops, PMI normal   ABD: Abdomen is soft  Bowel sounds all quadrants  Nontender with no distention  EXT: no edema  Skin: normal turgor, normal texture, no rashes  Psych: affect normal, mood normal  Neuro: AA; answers appropriately even with expressive>receptive aphasia     The above physical exam was reviewed and updated as determined by my evaluation of the patient on 5/30/2023  Invasive Devices     Drain  Duration           Urethral Catheter Coude 16 Fr  10 days                   VTE Pharmacologic Prophylaxis: Heparin  Code Status: Level 1 - Full Code  Current Length of Stay: 18 day(s)      Total time spent:  30 minutes with more than 50% spent counseling/coordinating care  Counseling includes discussion with patient re: progress  and discussion with patient of his/her current medical state/information  Coordination of patient's care was performed in conjunction with primary service  Time invested included review of patient's labs, vitals, and management of their comorbidities with continued monitoring  In addition, this patient was discussed with medical team including physician and advanced extenders  The care of the patient was extensively discussed and appropriate treatment plan was formulated unique for this patient  Medical decision making for the day was made by supervising physician unless otherwise noted in their attestation statement  ** Please Note:  voice to text software may have been used in the creation of this document   Although proof errors in transcription or interpretation are a potential of such software**

## 2023-05-30 NOTE — PROGRESS NOTES
"OT Daily Treatment Note       05/30/23 1000   Pain Assessment   Pain Assessment Tool 0-10   Pain Score 2   Pain Location/Orientation Orientation: Right;Location: Leg   Pain Onset/Description Descriptor: [facility] Pain Intervention(s) MD notified (Comment)  (Dr Kasia Maldonado notified via tiger text and did speak with pt re: this matter during session)   Restrictions/Precautions   Precautions Aphasia; Aspiration;Bed/chair alarms;Cognitive; Fall Risk; [place];Pain;Supervision on toilet/commode;Visual deficit   Lifestyle   Autonomy \"I don't think I'm doing better\"   Sit to Lying   Type of Assistance Needed Supervision   Physical Assistance Level No physical assistance   Comment CS; HOB flat, no bedrails   Sit to Lying CARE Score 4   Sit to Stand   Type of Assistance Needed Incidental touching   Physical Assistance Level No physical assistance   Comment completed multiple STS during this session without an AD, with a verónica walker and again with a RW to determine which allows for the best support in stance as pt does tend to have a R lateral lean  without an AD, pt does require min A with HHA, with verónica walker and RW, pt req CG which was the most consistent throughout the session  pt did require max vc for R hand placement due to decrease motor planning/coordination but overall pt was standing with CGA  Sit to Stand CARE Score 4   Bed-Chair Transfer   Type of Assistance Needed Physical assistance   Physical Assistance Level 26%-50%   Comment completed multiple SPT during this session without an AD, with a verónica walker and again with a RW to determine which allows for the best support in stance as pt does tend to have a R lateral lean  2nd person was present for SPT with an AD just for safety/SBA but overall was not needed as pt was able to complete with min A x 1 and mod vc for R L/UE placement and equipment mgmt  towards end of session, pt did complete a min A x1 SPT without an AD from [address] > bed   Pt did present to use RW " better than HW as HW appeared to get in the way of the pts LE which presented as a safety barrier  with RW, pt was able to better coordinate turning and bringing RW with him but will require that initial cue for R hand placement on the RW  overall pt is progressing and with continued practice, pt may be able to complete SPT <> BSC with RW compared to the current swap out method  Chair/Bed-to-Chair Transfer CARE Score 3   Neuromuscular Education   Comments pt engaged in large peg task focusing on R attention, visual scanning, two-step commands, grasp/release, gross and fine motor coordination, in-hand manipulation and motor planning  pt did initially require max vc and mod tc to scan far R to locate pegs but once pt located pegs, pt demo G ability to grab peg, reorient in R hand and place on peg board without physical assist  attempted to grade up activity from a visual standpoint by having pt only grab blue pegs but pt did have inc difficulty and was unable to distinguish between colors, even with cues  To decrease frustration, OT then allowed pt to grab any colored peg but cued pt to place them in a straight line horizontally and then vertically  pt required min A to start as pt was having difficulty coordinating horizontally but after completing x3, pt was able to complete the rest of the board with verbal or tactile cues  towards end, pt was c/o inc fatigue and was having inc difficulty coordinating to place peg in peg board  to decrease frustration, once pt completed vertical line, activity stopped  Coordination   Gross Motor see above   Fine Motor see above   Dexterity see above   In Hand Manipulation see above   Cognition   Overall Cognitive Status Impaired   Arousal/Participation Alert; Cooperative   Attention Attends with cues to redirect   Orientation Level Oriented to person;Oriented to place; Disoriented to time;Oriented to situation   Memory Decreased recall of precautions;Decreased recall of recent events;Decreased short term memory   Following Commands Follows one step commands inconsistently   Additional Activities   Additional Activities Other (Comment)   Additional Activities Comments to further challenge pts vision perceptual abilities and language/communication, pt placed multiple household items in front of the pt (horizontally) and cued pt to name items  pt was able to name 5/5 items  OT then asked pt to hand OT particular items out of order which encouraged the pt to scan the table and grab the specific item that was being asked  pt completed with 100% accuracy  Pt then handed pt large pin of household items and cued pt to locate a screwdriver, pt did require mod vc for this activity as he would continuously locate items only to the left of the bin  with cues, pt then able to accurately locate, grab and hand off screw   pts R inattention continues to be a major barrier for the pts progress with these tasks  Activity Tolerance   Activity Tolerance Patient tolerated treatment well   Medical Staff Made Aware LPN Heavenly made aware of jo bag leaking which LPN did replace during session  Dr Brendon Raymundo also made aware of pts LE cramping which Dr Brendon Raymundo did come to the session to briefly speak with pt regarding this matter  Assessment   Treatment Assessment Pt participated in skilled OT session with focus on functional transfer training, UE strengthening/ROM, cognitive reorientation, neuro re-ed, fine motor coordination activities, compensatory technique education, continued education, energy conservation and activity engagement   See flowsheet for details of session and current functional status   Pt is limited by weakness, decreased ROM, impaired balance, decreased endurance, decreased coordination, increased fall risk, decreased ADLS, decreased IADLS, pain, decreased activity tolerance, decreased safety awareness, impaired judgement, decreased cognition, decreased sensation, decreased strength and visual deficits and requires skilled OT services to increase independence and safety with ADL completion in prep for DC SNF  Plan to continue ADL retraining, functional transfer training, UE strengthening/ROM, endurance training, cognitive reorientation, Pt/caregiver education, equipment evaluation/education, neuro re-ed, fine motor coordination activities, compensatory technique education, continued education, energy conservation and activity engagement  to address barriers mentioned above  Prognosis Fair   Problem List Decreased strength;Decreased range of motion;Decreased endurance; Impaired balance;Decreased mobility; Decreased coordination;Decreased cognition; Impaired judgement;Decreased safety awareness; Impaired vision; Impaired sensation;Pain   Barriers to Discharge Inaccessible home environment;Decreased caregiver support   Plan   Treatment/Interventions ADL retraining;Functional transfer training; Therapeutic exercise; Endurance training;Cognitive reorientation;Patient/family training;Equipment eval/education; Bed mobility; Compensatory technique education;Gait training   Progress Progressing toward goals   OT Therapy Minutes   OT Time In 1000   OT Time Out 1130   OT Total Time (minutes) 90   OT Mode of treatment - Individual (minutes) 90   OT Mode of treatment - Concurrent (minutes) 0   OT Mode of treatment - Group (minutes) 0   OT Mode of treatment - Co-treat (minutes) 0   OT Mode of Treatment - Total time(minutes) 90 minutes   OT Cumulative Minutes 1255   Therapy Time missed   Time missed?  No

## 2023-05-30 NOTE — PLAN OF CARE
Problem: PAIN - ADULT  Goal: Verbalizes/displays adequate comfort level or baseline comfort level  Description: Interventions:  - Encourage patient to monitor pain and request assistance  - Assess pain using appropriate pain scale  - Administer analgesics based on type and severity of pain and evaluate response  - Implement non-pharmacological measures as appropriate and evaluate response  - Consider cultural and social influences on pain and pain management  - Notify physician/advanced practitioner if interventions unsuccessful or patient reports new pain  Outcome: Progressing     Problem: INFECTION - ADULT  Goal: Absence or prevention of progression during hospitalization  Description: INTERVENTIONS:  - Assess and monitor for signs and symptoms of infection  - Monitor lab/diagnostic results  - Monitor all insertion sites, i e  indwelling lines, tubes, and drains  - Monitor endotracheal if appropriate and nasal secretions for changes in amount and color  - [place] appropriate cooling/warming therapies per order  - Administer medications as ordered  - Instruct and encourage patient and family to use good hand hygiene technique  - Identify and instruct in appropriate isolation precautions for identified infection/condition  Outcome: Progressing  Goal: Absence of fever/infection during neutropenic period  Description: INTERVENTIONS:  - Monitor WBC    Outcome: Progressing     Problem: SAFETY ADULT  Goal: Patient will remain free of falls  Description: INTERVENTIONS:  - Educate patient/family on patient safety including physical limitations  - Instruct patient to call for assistance with activity   - Consult OT/PT to assist with strengthening/mobility   - Keep Call bell within reach  - Keep bed low and locked with side rails adjusted as appropriate  - Keep care items and personal belongings within reach  - Initiate and maintain comfort rounds  - Make Fall Risk Sign visible to staff  - Offer Toileting every 2 Hours, in advance of need  - Initiate/Maintain bed/chair alarm  - Obtain necessary fall risk management equipment: non skid footwear  - Apply yellow socks and bracelet for high fall risk patients  - Consider moving patient to room near nurses station  Outcome: Progressing  Goal: Maintain or return to baseline ADL function  Description: INTERVENTIONS:  -  Assess patient's ability to carry out ADLs; assess patient's baseline for ADL function and identify physical deficits which impact ability to perform ADLs (bathing, care of mouth/teeth, toileting, grooming, dressing, etc )  - Assess/evaluate cause of self-care deficits   - Assess range of motion  - Assess patient's mobility; develop plan if impaired  - Assess patient's need for assistive devices and provide as appropriate  - Encourage maximum independence but intervene and supervise when necessary  - Involve family in performance of ADLs  - Assess for home care needs following discharge   - Consider OT consult to assist with ADL evaluation and planning for discharge  - Provide patient education as appropriate  Outcome: Progressing  Goal: Maintains/Returns to pre admission functional level  Description: INTERVENTIONS:  - Perform BMAT or MOVE assessment daily    - Set and communicate daily mobility goal to care team and patient/family/caregiver  - Collaborate with rehabilitation services on mobility goals if consulted  - Perform Range of Motion 3 times a day  - Reposition patient every 2 hours    - Dangle patient 3 times a day  - Stand patient 3 times a day  - Ambulate patient 3 times a day  - Out of bed to chair 3 times a day   - Out of bed for meals 3 times a day  - Out of bed for toileting  - Record patient progress and toleration of activity level   Outcome: Progressing     Problem: DISCHARGE PLANNING  Goal: Discharge to home or other facility with appropriate resources  Description: INTERVENTIONS:  - Identify barriers to discharge w/patient and caregiver  - Arrange for needed discharge resources and transportation as appropriate  - Identify discharge learning needs (meds, wound care, etc )  - Arrange for interpretive services to assist at discharge as needed  - Refer to Case Management Department for coordinating discharge planning if the patient needs post-hospital services based on physician/advanced practitioner order or complex needs related to functional status, cognitive ability, or social support system  Outcome: Progressing     Problem: Prexisting or High Potential for Compromised Skin Integrity  Goal: Skin integrity is maintained or improved  Description: INTERVENTIONS:  - Identify patients at risk for skin breakdown  - Assess and monitor skin integrity  - Assess and monitor nutrition and hydration status  - Monitor labs   - Assess for incontinence   - Turn and reposition patient  - Assist with mobility/ambulation  - Relieve pressure over bony prominences  - Avoid friction and shearing  - Provide appropriate hygiene as needed including keeping skin clean and dry  - Evaluate need for skin moisturizer/barrier cream  - Collaborate with interdisciplinary team   - Patient/family teaching  - Consider wound care consult   Outcome: Progressing     Problem: MOBILITY - ADULT  Goal: Maintain or return to baseline ADL function  Description: INTERVENTIONS:  -  Assess patient's ability to carry out ADLs; assess patient's baseline for ADL function and identify physical deficits which impact ability to perform ADLs (bathing, care of mouth/teeth, toileting, grooming, dressing, etc )  - Assess/evaluate cause of self-care deficits   - Assess range of motion  - Assess patient's mobility; develop plan if impaired  - Assess patient's need for assistive devices and provide as appropriate  - Encourage maximum independence but intervene and supervise when necessary  - Involve family in performance of ADLs  - Assess for home care needs following discharge   - Consider OT consult to assist with ADL evaluation and planning for discharge  - Provide patient education as appropriate  Outcome: Progressing  Goal: Maintains/Returns to pre admission functional level  Description: INTERVENTIONS:  - Perform BMAT or MOVE assessment daily    - Set and communicate daily mobility goal to care team and patient/family/caregiver  - Collaborate with rehabilitation services on mobility goals if consulted  - Perform Range of Motion 3 times a day  - Reposition patient every 2 hours  - Dangle patient 3 times a day  - Stand patient 3 times a day  - Ambulate patient 3 times a day  - Out of bed to chair 3 times a day   - Out of bed for meals 3 times a day  - Out of bed for toileting  - Record patient progress and toleration of activity level   Outcome: Progressing     Problem: Nutrition/Hydration-ADULT  Goal: Nutrient/Hydration intake appropriate for improving, restoring or maintaining nutritional needs  Description: Monitor and assess patient's nutrition/hydration status for malnutrition  Collaborate with interdisciplinary team and initiate plan and interventions as ordered  Monitor patient's weight and dietary intake as ordered or per policy  Utilize nutrition screening tool and intervene as necessary  Determine patient's food preferences and provide high-protein, high-caloric foods as appropriate       INTERVENTIONS:  - Monitor oral intake, urinary output, labs, and treatment plans  - Assess nutrition and hydration status and recommend course of action  - Evaluate amount of meals eaten  - Assist patient with eating if necessary   - Allow adequate time for meals  - Recommend/ encourage appropriate diets, oral nutritional supplements, and vitamin/mineral supplements  - Order, calculate, and assess calorie counts as needed  - Recommend, monitor, and adjust tube feedings and TPN/PPN based on assessed needs  - Assess need for intravenous fluids  - Provide specific nutrition/hydration education as appropriate  - Include patient/family/caregiver in decisions related to nutrition  Outcome: Progressing

## 2023-05-30 NOTE — PROGRESS NOTES
05/30/23 0830   Pain Assessment   Pain Assessment Tool 0-10   Pain Score 4   Pain Location/Orientation Orientation: Right;Orientation: Upper; Location: Leg   Restrictions/Precautions   Precautions Aphasia;Bed/chair alarms;Aspiration;Cognitive; Fall Risk;Supervision on toilet/commode;Visual deficit   Subjective   Subjective pt agreeable to perform skilled PT   Sit to Stand   Type of Assistance Needed Physical assistance   Physical Assistance Level 25% or less   Sit to Stand CARE Score 3   Bed-Chair Transfer   Type of Assistance Needed Physical assistance   Physical Assistance Level 51%-75%   Comment SPT A x1   Chair/Bed-to-Chair Transfer CARE Score 2   Transfer Bed/Chair/Wheelchair   Adaptive Equipment Other  (in front hands on)   Therapeutic Interventions   Flexibility passive stretch B HS & calves   Balance EOB balance   Assessment   Treatment Assessment pt focus on bed mobility SPT STS xfers ModA to [place] 1-2 person if fatigue   Pt in [address] recliner with all needs in reach alarm on   Barriers to Discharge Inaccessible home environment;Decreased caregiver support   Plan   Progress Progressing toward goals   PT Therapy Minutes   PT Time In 0830   PT Time Out 0900   PT Total Time (minutes) 30   PT Mode of treatment - Individual (minutes) 30   PT Mode of treatment - Concurrent (minutes) 0   PT Mode of treatment - Group (minutes) 0   PT Mode of treatment - Co-treat (minutes) 0   PT Mode of Treatment - Total time(minutes) 30 minutes   PT Cumulative Minutes 1128   Therapy Time missed   Time missed?  No

## 2023-05-30 NOTE — PROGRESS NOTES
"   05/30/23 0900   Pain Assessment   Pain Assessment Tool 0-10   Pain Score 4   Pain Location/Orientation Orientation: Right;Orientation: Upper; Location: Leg   Pain Onset/Description Descriptor: Cramping   Comprehension   Comprehension (FIM) 3 - Understands basic info/conversation 50-74% of time   Expression   Expression (FIM) 3 - Expresses basic info/needs 50-74% of time   Social Interaction   Social Interaction (FIM) 4 - Interacts 75-89% of time   Problem Solving   Problem solving (FIM) 2 - Solves basic problems 25-49% of time   Memory   Memory (FIM) 2 - Recognizes, recalls/performs 25-49%   Speech/Language/Cognition Assessmetn   Treatment Assessment Pt in day space ready to begin skilled ST session targeting expressive/receptive language  Pt initially asked to practice his expressive language for bio info  I'ly recalled his F & L name, address house number sister in [address] name, sister's name, and one of his dog's names  He required multimodal & maximal (sentence completion, initial phoneme, phonetic placement;PP, semantic, visual/written, M/C in F02) cues to name remaining parts of his address, wife's name, date of birth, age, and granddtr's names Pt again with slow rise for expressive language this morning comparably to PM sessions  Given the pt's slow rise, SLP engaged in relaxed conversational style tx to encourage greater language output and to reduce frustration  Pt and SLP discussing topics such as being [place] twins with his sister, where she lives, that she is older, [place] race winner from yesterday, details about interest in [de-identified], raceway's he's been to, etc  Pt with min-modA needed to successfully communicate these things  Next, pt provided with whiteboard and common object  Asked to first name the object, then write the name of the object on the whiteboard  With modA (tactile + semantic feature cue), able to name 2/2   Unable to legibly write for \"cards\" or \"marker\" as pt writing on top of letters, " and poorly formulating letters and spelling incorrectly; slight improvement where max visual written model provided  Of note, pt's right inattention impacting performance on written expressive task  Given this, SLP trialed letter writing task where SLP stated the letter, and pt required to write letter  Pt required maxA (phonemic cue, written model, [place])  Finally, pt ask to demonstrate reading comprehension, oral reading, and convergent category naming from given 3 categorical items  3/9 i'ly for reading comp and expressive language  Able to orally read 13/26 words provided i'ly, 22/26 where phonemic cues provided  Based on today's session, pt continues to show benefit from skilled ST, specifically with the use of multimodal approaches, as well as constraint induced language therapy (CILT) during spontaneous interaction  Recommend to continue to apply phonemic placement cues, increased time, phonemic isolation cues, semantic cues, written/visua cues, and tactile cues during structured therapy sessions to optimize expressive/receptive language skills necessary for improving functional independence with effective communication while at the [facility] and upon D/C  SLP Therapy Minutes   SLP Time In 0900   SLP Time Out 1000   SLP Total Time (minutes) 60   SLP Mode of treatment - Individual (minutes) 60   SLP Mode of treatment - Concurrent (minutes) 0   SLP Mode of treatment - Group (minutes) 0   SLP Mode of treatment - Co-treat (minutes) 0   SLP Mode of Treatment - Total time(minutes) 60 minutes   SLP Cumulative Minutes 925   Therapy Time missed   Time missed?  No

## 2023-05-31 ENCOUNTER — APPOINTMENT (INPATIENT)
Dept: RADIOLOGY | Facility: HOSPITAL | Age: 63
DRG: 057 | End: 2023-05-31
Payer: COMMERCIAL

## 2023-05-31 PROCEDURE — 97112 NEUROMUSCULAR REEDUCATION: CPT

## 2023-05-31 PROCEDURE — 99232 SBSQ HOSP IP/OBS MODERATE 35: CPT | Performed by: INTERNAL MEDICINE

## 2023-05-31 PROCEDURE — NC001 PR NO CHARGE

## 2023-05-31 PROCEDURE — 70498 CT ANGIOGRAPHY NECK: CPT

## 2023-05-31 PROCEDURE — 92507 TX SP LANG VOICE COMM INDIV: CPT

## 2023-05-31 PROCEDURE — 70496 CT ANGIOGRAPHY HEAD: CPT

## 2023-05-31 PROCEDURE — NC001 PR NO CHARGE: Performed by: PSYCHIATRY & NEUROLOGY

## 2023-05-31 PROCEDURE — 99233 SBSQ HOSP IP/OBS HIGH 50: CPT

## 2023-05-31 PROCEDURE — 99232 SBSQ HOSP IP/OBS MODERATE 35: CPT | Performed by: PSYCHIATRY & NEUROLOGY

## 2023-05-31 PROCEDURE — 97535 SELF CARE MNGMENT TRAINING: CPT

## 2023-05-31 PROCEDURE — G1004 CDSM NDSC: HCPCS

## 2023-05-31 PROCEDURE — 97530 THERAPEUTIC ACTIVITIES: CPT

## 2023-05-31 RX ORDER — LOSARTAN POTASSIUM 25 MG/1
25 TABLET ORAL DAILY
Status: DISCONTINUED | OUTPATIENT
Start: 2023-05-31 | End: 2023-05-31

## 2023-05-31 RX ORDER — METOPROLOL SUCCINATE 100 MG/1
100 TABLET, EXTENDED RELEASE ORAL DAILY
Status: DISCONTINUED | OUTPATIENT
Start: 2023-05-31 | End: 2023-06-06 | Stop reason: HOSPADM

## 2023-05-31 RX ORDER — SODIUM CHLORIDE 9 MG/ML
75 INJECTION, SOLUTION INTRAVENOUS ONCE
Status: COMPLETED | OUTPATIENT
Start: 2023-05-31 | End: 2023-06-01

## 2023-05-31 RX ORDER — TRAZODONE HYDROCHLORIDE 50 MG/1
50 TABLET ORAL
Status: DISCONTINUED | OUTPATIENT
Start: 2023-05-31 | End: 2023-06-06 | Stop reason: HOSPADM

## 2023-05-31 RX ORDER — METOPROLOL SUCCINATE 50 MG/1
50 TABLET, EXTENDED RELEASE ORAL DAILY
Status: DISCONTINUED | OUTPATIENT
Start: 2023-05-31 | End: 2023-05-31

## 2023-05-31 RX ORDER — LOSARTAN POTASSIUM 50 MG/1
50 TABLET ORAL DAILY
Status: DISCONTINUED | OUTPATIENT
Start: 2023-05-31 | End: 2023-06-06 | Stop reason: HOSPADM

## 2023-05-31 RX ADMIN — HEPARIN SODIUM 5000 UNITS: 5000 INJECTION INTRAVENOUS; SUBCUTANEOUS at 05:24

## 2023-05-31 RX ADMIN — CEPHALEXIN 500 MG: 500 CAPSULE ORAL at 05:24

## 2023-05-31 RX ADMIN — SODIUM CHLORIDE 75 ML/HR: 0.9 INJECTION, SOLUTION INTRAVENOUS at 16:23

## 2023-05-31 RX ADMIN — TAMSULOSIN HYDROCHLORIDE 0.8 MG: 0.4 CAPSULE ORAL at 16:32

## 2023-05-31 RX ADMIN — METOPROLOL SUCCINATE 100 MG: 100 TABLET, EXTENDED RELEASE ORAL at 09:19

## 2023-05-31 RX ADMIN — SENNOSIDES, DOCUSATE SODIUM 2 TABLET: 8.6; 5 TABLET ORAL at 17:52

## 2023-05-31 RX ADMIN — PANTOPRAZOLE SODIUM 20 MG: 20 TABLET, DELAYED RELEASE ORAL at 05:24

## 2023-05-31 RX ADMIN — POLYETHYLENE GLYCOL 3350 17 G: 17 POWDER, FOR SOLUTION ORAL at 09:18

## 2023-05-31 RX ADMIN — CEPHALEXIN 500 MG: 500 CAPSULE ORAL at 13:57

## 2023-05-31 RX ADMIN — HEPARIN SODIUM 5000 UNITS: 5000 INJECTION INTRAVENOUS; SUBCUTANEOUS at 13:57

## 2023-05-31 RX ADMIN — SENNOSIDES, DOCUSATE SODIUM 2 TABLET: 8.6; 5 TABLET ORAL at 09:19

## 2023-05-31 RX ADMIN — HEPARIN SODIUM 5000 UNITS: 5000 INJECTION INTRAVENOUS; SUBCUTANEOUS at 21:20

## 2023-05-31 RX ADMIN — TICAGRELOR 90 MG: 90 TABLET ORAL at 21:24

## 2023-05-31 RX ADMIN — CEPHALEXIN 500 MG: 500 CAPSULE ORAL at 21:20

## 2023-05-31 RX ADMIN — TICAGRELOR 90 MG: 90 TABLET ORAL at 09:19

## 2023-05-31 RX ADMIN — DONEPEZIL HYDROCHLORIDE 5 MG: 5 TABLET ORAL at 17:52

## 2023-05-31 RX ADMIN — ATORVASTATIN CALCIUM 40 MG: 40 TABLET, FILM COATED ORAL at 09:19

## 2023-05-31 RX ADMIN — ASPIRIN 81 MG: 81 TABLET, COATED ORAL at 09:19

## 2023-05-31 RX ADMIN — IOHEXOL 90 ML: 350 INJECTION, SOLUTION INTRAVENOUS at 15:54

## 2023-05-31 RX ADMIN — DONEPEZIL HYDROCHLORIDE 5 MG: 5 TABLET ORAL at 09:18

## 2023-05-31 RX ADMIN — LOSARTAN POTASSIUM 50 MG: 50 TABLET, FILM COATED ORAL at 09:19

## 2023-05-31 RX ADMIN — CITALOPRAM HYDROBROMIDE 20 MG: 20 TABLET ORAL at 09:19

## 2023-05-31 RX ADMIN — TRAZODONE HYDROCHLORIDE 50 MG: 50 TABLET ORAL at 21:24

## 2023-05-31 NOTE — PROGRESS NOTES
Internal Medicine Progress Note  Patient: Brissa Carnes  Age/sex: 58 y o  male  Medical Record #: [id number]      ASSESSMENT/PLAN: (Interval History)  Brissa Carnes is seen and examined and management for following issues:    Acute multifocal ischemic strokes  • Occurred in different arterial distributions  • IVY was negative for thrombus/PFO  • Felt not to be vasculitis and negative by a-gram 5/4/23  • Continue ASA/Brilinta/statin  • CTH done 5/29 = was abnormal with possible new infarcts after he felt his speech had declined    (Dr Evette Villalobos had seen him 5/28 for this complaint and had normal exam and felt speech was at baseline)  • MRI brain 5/30 = some new areas of acute to subacute left parietal/occipital infarcts  • For CTA today  • For IVF 2/2 having the CTA and creatinine on 5/13, 5/15 1 22-1 29, currently 1 04     Left ICA stenosis  • Was noted to have all of the CVAs in last month have been in left anterior circulation  • S/p PTA/stent 5/4/23  • Recheck carotid doppler 6 weeks and see NS  • Continue AP/statin     Right ICA stenosis  • To followup with Vasc surgery as OP  • Continue AP/statin     LOOP recorder  • Was placed 3/2019  • Neuro wants it to be replaced = for OP to Cardiology     Leukocytosis  • Had no fevers  • CXR and Procal were normal in the hospital  • Mild/improving     HTN  • Home:  Toprol 100mg qd/Norvasc 10mg qd/Losartan 50mg qd/HCTZ 25mg qd/Hydralazine 25mg TID  • Here:  Toprol 100mg qd/Losartan 50mg qd  • stable     Pre DM  • HbA1C 5 8  • Takes Metformin at home but currently not on it in hospital  • Accuchecks were stable and dc'd in the hospital  • Monitor FBS with BMPs and continue DM diet  • FBS 94 on 5/28/23     Depression  • Continue Celexa as at home     Memory loss  • Continue Aricept as at home  • He has had memory issues since his CVA in 2019     Urine retention  • Has jo placed 5/8/23  • VT 5/18 failed and jo replaced by Urology 5/19/23 and is not to be removed  • Continue Flomax     Chronic LBP  • MRI showed advanced multilevel spondylosis, slightly progressed on the right at L2-3 compared to the prior study but otherwise stable  • Pain management per PMR     AAA  • Found on L-spine MRI  • Measures 3 4 cm  • OP followup     Hyponatremia  • Stopped HCTZ 5/16/23  • Resolved off of HCTZ and had been given IVF     Leg cramps  • Getting in both calves  • He says he gets them at home and walks them out  • Mag level on 5/28 was 2 3 and K+ 3 6  • For stretching in PT and Beng[place]     +UA  • Await cx  • Has had no fever and WBC on 5/28 was down to 10 6  • Dr Vanesa William started Keflex 5/30        Discharge date:  Reteam    The above assessment and plan was reviewed and updated as determined by my evaluation of the patient on 5/31/2023      Labs:   Results from last 7 days   Lab Units 05/28/23  0507 05/25/23  0549   HEMATOCRIT % 36 5 35 1*   HEMOGLOBIN g/dL 12 0 12 0   PLATELETS Thousands/uL 277 288   WBC Thousand/uL 10 69* 11 93*     Results from last 7 days   Lab Units 05/28/23  0507 05/25/23  0549   BUN mg/dL 19 23   CALCIUM mg/dL 9 1 9 0   CHLORIDE mmol/L 109* 109*   CO2 mmol/L 26 25   CREATININE mg/dL 1 04 1 02   POTASSIUM mmol/L 3 6 3 7   SODIUM mmol/L 138 136                   Review of Scheduled Meds:  Current Facility-Administered Medications   Medication Dose Route Frequency Provider Last Rate   • acetaminophen  975 mg Oral TID PRN David Stone MD     • aspirin  81 mg Oral Daily Eng Lee, DO     • atorvastatin  40 mg Oral Daily Eng Lee, DO     • bisacodyl  10 mg Rectal Daily PRN Eng Lee, DO     • calcium carbonate  1,000 mg Oral Daily PRN Eng Lee, DO     • cephalexin  500 mg Oral [facility] David Stone MD     • citalopram  20 mg Oral Daily Eng Lee, DO     • donepezil  5 mg Oral BID Eng Lee, DO     • heparin (porcine)  5,000 Units Subcutaneous [facility] Eng Lee, DO     • lidocaine  1 patch Topical Daily Eng Lee, "DO     • lidocaine   Topical Q4H PRN Blas Fenton MD     • losartan  50 mg Oral Daily Blas Fenton MD     • menthol-methyl salicylate   Apply externally 4x Daily PRN Sangita Roman MD     • methocarbamol  500 mg Oral Q6H PRN Sharlie Apley, DO     • metoprolol succinate  100 mg Oral Daily Blas Fenton MD     • ondansetron  4 mg Oral Q6H PRN Sharlie Apley, DO     • oxyCODONE  5 mg Oral Q6H PRN Sharlie Apley, DO     • oxyCODONE  2 5 mg Oral Q6H PRN Sharlie Apley, DO     • pantoprazole  20 mg Oral Early Morning Sharlie Apley, DO     • polyethylene glycol  17 g Oral QAM Sharlie Apley, DO     • senna  2 tablet Oral Daily PRN Sharlie Apley, DO     • senna-docusate sodium  2 tablet Oral BID Sharlie Apley, DO     • sodium chloride  75 mL/hr Intravenous Once Blas Fenton MD     • tamsulosin  0 8 mg Oral Daily With 5 ERIC Seymour     • ticagrelor  90 mg Oral Q12H [facility] Sharlie Apley, DO     • traZODone  25 mg Oral HS Blas Fenton MD         Subjective/ HPI: Patient seen and examined  Patients overnight issues or events were reviewed with nursing or staff during rounds or morning huddle session  New or overnight issues include the following:     No overnight issues  Offers no complaints  MRI brain done 2/2 abn CT = showed some new areas of acute to subacute left parietal/occipital ischemia  Now for CTA    ROS:   A 10 point ROS was performed; negative except as noted above  Imaging:     MRI brain wo contrast   Final Result by Yoan Saez MD (05/30 3006)      Similar areas of left parietal occipital recent ischemia/infarct  Some new areas of acute to subacute left parietal/occipital acute to subacute ischemia  Findings suggesting normal pressure hydrocephalus  Findings were marked as \"immediate\"in Epic and will now be related to the ordering physician or covering clinical team by the radiology liaison           Workstation performed: [id number]         CT " head wo contrast   Final Result by Leonid Mendez MD (05/30 7332)      Slight interval progression and extension of the nonhemorrhagic acute to early subacute left cerebral infarction noted on the recent MRI study  Mild regional sulcal crowding but no significant mass effect on the underlying ventricular system or evidence    of herniation  Redemonstrated disproportionate ventriculomegaly to sulcal prominence which can be seen in patients with normal pressure hydrocephalus  Background cerebral white matter changes consistent with moderate chronic microangiopathic disease  The study was marked in [facility] for immediate notification  Workstation performed: [id number]         CTA head and neck w wo contrast    (Results Pending)       *Labs /Radiology studies reviewed  *Medications reviewed and reconciled as needed  *Please refer to order section for additional ordered labs studies  *Case discussed with primary attending during morning huddle case rounds    Physical Examination:  Vitals:   Vitals:    05/30/23 1400 05/30/23 2100 05/31/23 0444 05/31/23 0900   BP: 132/64 123/59 152/69 134/72   BP Location: Right arm Right arm Right arm Right arm   Pulse: 79 71 70 80   Resp: 18 16 20 18   Temp: 98 1 °F (36 7 °C) 98 3 °F (36 8 °C) 98 2 °F (36 8 °C) 98 °F (36 7 °C)   TempSrc: Oral Oral Oral Oral   SpO2: 95% 95% 97%    Weight:   95 3 kg (210 lb 1 6 oz)        General Appearance: no distress, conversive  HEENT: PERRLA, conjuctiva normal; oropharynx clear; mucous membranes moist   Neck:  Supple, normal ROM  Lungs: CTA, normal respiratory effort, no retractions, expiratory effort normal  CV: regular rate and rhythm; no rubs/murmurs/gallops, PMI normal   ABD: soft; ND/NT; +BS  EXT: no edema  Skin: normal turgor, normal texture, no rashes  Psych: affect normal, mood normal  Neuro: AAO      The above physical exam was reviewed and updated as determined by my evaluation of the patient on 5/31/2023      Invasive Devices     Drain  Duration           Urethral Catheter Coude 16 Fr  11 days                   VTE Pharmacologic Prophylaxis: Heparin  Code Status: Level 1 - Full Code  Current Length of Stay: 19 day(s)      Total time spent:  30 minutes with more than 50% spent counseling/coordinating care  Counseling includes discussion with patient re: progress  and discussion with patient of his/her current medical state/information  Coordination of patient's care was performed in conjunction with primary service  Time invested included review of patient's labs, vitals, and management of their comorbidities with continued monitoring  In addition, this patient was discussed with medical team including physician and advanced extenders  The care of the patient was extensively discussed and appropriate treatment plan was formulated unique for this patient  Medical decision making for the day was made by supervising physician unless otherwise noted in their attestation statement  ** Please Note:  voice to text software may have been used in the creation of this document   Although proof errors in transcription or interpretation are a potential of such software**

## 2023-05-31 NOTE — PROGRESS NOTES
"   23 4129   Pain Assessment   Pain Assessment Tool 0-10   Pain Score No Pain   Restrictions/Precautions   Precautions Aphasia; Aspiration;Bed/chair alarms;Cognitive; Fall Risk; Cortez;Pain;Supervision on toilet/commode;Visual deficit   Comprehension   Comprehension (FIM) 4 - Understands basic info/conversation 75-90% of time   Expression   Expression (FIM) 3 - Expresses basic info/needs 50-74% of time   Social Interaction   Social Interaction (FIM) 5 - Interacts appropriately with others 90% of time   Problem Solving   Problem solving (FIM) 3 - Solves basic problmes 50-74% of time   Memory   Memory (FIM) 3 - Recognizes, recalls/performs 50-74%   Speech/Language/Cognition Assessmetn   Treatment Assessment Pt was asleep upon arrival but was easily awakened by voice  It was observed that pt continues to demonstrate decreased R side visual scanning as when looking over to R side to locate SLP  Pt also recalling current SLP since last worked w/ pt  It was observed that pt has demonstrated some improvement given spontaneous speech output as by reviewing daily events, etc  Still overall continue to target more structured speech tasks by engaging in the following:      LTM Biographical Review: Today, pt was able to elicit full name spontaneously  As for address, pt was able to elicit house number of initial part of street name \"[place]\" but required phonemic cues for remaining parts of street address  Pt was able to elicit city/state spontaneously  As for family members names, pt was able to elicit wife, son and 2 granddtrs names w/o cues  Increased difficulty noted today in attempts at dtr's names (stating \"Darrielle\" vs Margarita) and phrase cue to elicit grandson's name w/ success  Pt continues to exhibit most difficulty given  and age, needing maximal phonemic/phrase cues for all items to be elicited       Verbal Simple Sentence Completion Task:  Next SLP engaged pt in completing simple sentence phrase completion " "task (ie: drive a __, mail the ___, sing a ___, etc)  SLP encouraging pt to verbalize whatever word comes out  It was noted that pt's ability to spontaneously elicit target words which complete simple sentences was 10/20 accurate  When pt would request repetition or SLP repeating carrier phrase for pt, he was able to increase to increase to 13/20 accurate  In addition to repetition cues, pt then able to state target word to complete the sentences by given a phonemic cue increasing to 15/20  The remaining sentences for pt to elicit target words to complete sentences required repetition, phonemic and visual cue of initial letter of words to state 20/20 items  It was noted that during this task, pt did produce responses to complete sentences, to where at times, the responses were neologisms, jargon, or nonsensical responses, to where pt is observed to have more insight to overall errors being produced  Pt's overall verbal/oral apraxia was present at times given this task, to where that visual cue of SLP providing that feedback of how to make the initial sound of word was highly beneficial for pt to then state target words accordingly  Spontaneous Verbalized Speech given Familiar Topics: For remainder of session, SLP targeting more conversational speech output monitoring overall fluency and communicative intent given information provided given a topic  When SLP asked pt about visitors over the holiday, pt did state his spouse came to visit, but denied that children/grandchildren came to visit  SLP further probing pt about activities which he and his family would do over the Memorial Day weekend, to where pt was able to elicit that \"we usually go camping  \" Pt even furthermore stating how he has a camper already at a campground  SLP did guide for more clarification, to where pt was then able to state that there is a camper which in located on a pre-established campground   The overall location as to where this " "campground site is, pt did have difficulty in being able to state the city located  Pt was noted to name items which were included in his camper (ie: TV, full kitchen, bathroom, shower, beds)  This lead to more discussion about a \"cabin\" which pt was able to express to SLP that he and his brothers would go to when growing up  Again, pt exhibiting most difficulty given city which this cabin was located  Further discussion was held about other vacation spots for pt and his family, to where pt was able to spontaneously state \"the beach  \" Without SLP asking which shore point, pt already stated \"[place]\" but needing minimal cues for [place] vs [place]  Last topic which engaged pt in overall conversational speech and naming items in categories was discussion of fishing and hunting, where it was noted that pt was able to state 2 main fish which he would go catch in addition to naming 2 game items which he hunted  Other attempts at items per categories lead to neologisms when attempting to name items, benefiting from either binary choices or yes/no ?'s  At very end of session, pt did accordingly request toileting, to where SLP encouraged pt to use call bell to ring for someone to assist in completing transfer to commode  Pt was noted to demonstrate some difficulty in motor planning when attempting to turn to L from bed to commode, needing visual/tactile tap to that leg to initiate movement  At this time, pt is making slower and steady progress given receptive and expressive language skills, but still does present w/ fatigue and errors in spontaneous verbal output  Currently, pt will continue to benefit from skilled SLP services targeting functional communication skills, comprehension abilities as well as functional cognitive skills at this time in attempts to decrease overall caregiver burden over time      SLP Therapy Minutes   SLP Time In 0930   SLP Time Out 1030   SLP Total Time (minutes) 60   SLP Mode of treatment - " Individual (minutes) 60   SLP Mode of treatment - Concurrent (minutes) 0   SLP Mode of treatment - Group (minutes) 0   SLP Mode of treatment - Co-treat (minutes) 0   SLP Mode of Treatment - Total time(minutes) 60 minutes   SLP Cumulative Minutes 985   Therapy Time missed   Time missed?  No

## 2023-05-31 NOTE — PROGRESS NOTES
05/31/23 0213   Pain Assessment   Pain Assessment Tool 0-10   Pain Score No Pain   Restrictions/Precautions   Precautions Aphasia; Aspiration;Bed/chair alarms;Cognitive; Fall Risk; Cortez;Pain;Visual deficit;Supervision on toilet/commode   Weight Bearing Restrictions No   ROM Restrictions No   Cognition   Overall Cognitive Status Impaired   Arousal/Participation Alert; Cooperative   Attention Attends with cues to redirect   Memory Decreased recall of precautions;Decreased recall of recent events;Decreased short term memory   Following Commands Follows one step commands inconsistently   Subjective   Subjective Patient agreeable to PT session  Reports that he is tired   Roll Left and Right   Type of Assistance Needed Supervision   Comment use of bed rails   Roll Left and Right CARE Score 4   Sit to Lying   Type of Assistance Needed Incidental touching   Physical Assistance Level No physical assistance   Comment HOB flat, CGA, use of bed rails x2 trials   Sit to Lying CARE Score 4   Lying to Sitting on Side of Bed   Type of Assistance Needed Incidental touching   Physical Assistance Level No physical assistance   Comment HOB flat, CGA   Lying to Sitting on Side of Bed CARE Score 4   Sit to Stand   Type of Assistance Needed Physical assistance   Physical Assistance Level 26%-50%   Comment various surfaces like EOB and WC   Sit to Stand CARE Score 3   Bed-Chair Transfer   Type of Assistance Needed Physical assistance   Physical Assistance Level 51%-75%   Comment EOB<>WC, EOB<>bedside commode   ModA   Chair/Bed-to-Chair Transfer CARE Score 2   Walk 10 Feet   Type of Assistance Needed Physical assistance   Physical Assistance Level Total assistance   Comment HHA x2 with WC follow   Walk 10 Feet CARE Score 1   Walk 50 Feet with Two Turns   Type of Assistance Needed Physical assistance   Physical Assistance Level Total assistance   Comment HHAx2 with CF   Walk 50 Feet with Two Turns CARE Score 1   Walk 150 Feet   Reason if not Attempted Safety concerns   Walk 150 Feet CARE Score 88   Ambulation   Primary Mode of Locomotion Prior to Admission Walk   Distance Walked (feet) 10 ft  (50 ft)   Assist Device Hand Hold   Gait Pattern Ataxic; Inconsistant Deepti;Narrow NEREIDA; Improper weight shift;Decreased R stance; Slow Deepti;Decreased foot clearance  (R lateral lean)   Limitations Noted In Balance; Coordination;Midline Orientation; Endurance;Posture; Safety; Neglect; Heel Strike;Strength;Speed;Swing   Provided Assistance with: Balance   Walk Assist Level Moderate Assist;Chair Follow   Findings HHA x2 people with CF for safety  Ambulated 10ft with RLE crossing midline often and R foot inversion noted  For 50 ft placed 4 lb weight on RLE to improve proprioception  Patient with less R foot inversion and slightly wider base of support compared to without 4# ankle weight  As he fatigued, leans to the R side, VC for midline orientation and shifting weight to the L   Does the patient walk? 2  Yes   Toilet Transfer   Type of Assistance Needed Physical assistance   Physical Assistance Level 26%-50%   Comment Min/ModA x2 trials from bedside commode   Toilet Transfer CARE Score 3   Assessment   Treatment Assessment Patient engaged in skilled physical therapy session focusing on bed mobility, transfer training, and gait training  Patient was able to perform SPT transfers from EOB<>WC with ModA x 1 person  Improved gait mechanics noted when using for 4# weight on ankle  Plan was to trial markers on floor for improved foot placement, but RN came for IV placement  PT present for entire session, but did not bill for 13 minutes where RN came to place patient IV as he was a hard stick  Patient required IV placement as he was scheduled to undergo CAT scan  Patient experienced bowel movement during session  Required assistance of two people for toileting hygeine  At end of session, patient was returned to bed with all needs within in reach   Patient would continue to benefit from skilled PT services to work on bed mobility, transfer training, gait training, NPP, caregiver training, midline reorientation, and sitting and standing balance  Problem List Decreased strength; Impaired balance;Decreased mobility; Decreased coordination;Decreased cognition; Impaired judgement;Decreased safety awareness; Impaired vision; Impaired sensation   Barriers to Discharge Inaccessible home environment;Decreased caregiver support   Plan   Treatment/Interventions Functional transfer training;LE strengthening/ROM; Therapeutic exercise; Endurance training;Patient/family training;Bed mobility;Gait training   Progress Progressing toward goals   Recommendation   PT Discharge Recommendation Home with outpatient rehabilitation   Equipment Recommended Wheelchair   PT Therapy Minutes   PT Time In 1413   PT Time Out 9600   PT Total Time (minutes) 77   Therapy Time missed   Time missed?  No     Jahaira Greer, PT, DPT

## 2023-05-31 NOTE — TEAM CONFERENCE
Acute RehabilitationTe Conference Note  Date: 5/31/2023   Time: 11:42 AM       Patient Name:  Rocael Garcia       Medical Record Number: [id number]   YOB: 1960  Sex:  Male          Room/Bed:  [facility] 971-01  Payor Info:  Payor: ROCHELLE  REP / Plan: Ami Sheikh  REP / Product Type: Medicare PPO /      Admitting Diagnosis: Stroke ([place] [address] ) [I63 9]   Admit Date/Time:  5/12/2023  2:35 PM  Admission Comments: No comment available     Primary Diagnosis:  Stroke [facility])  Principal Problem: Stroke [facility])    Patient Active Problem List    Diagnosis Date Noted   • Abnormal laboratory test 05/15/2023   • Leukocytosis 05/12/2023   • History of tobacco use 05/12/2023   • Chronic ischemic left MCA stroke 05/12/2023   • Hypokalemia 05/12/2023   • Abdominal aortic aneurysm (AAA) ([place] [address] ) 05/08/2023   • Tachycardia 05/05/2023   • Prediabetes 04/27/2023   • SIRS (systemic inflammatory response syndrome) ([place] [address] ) 04/27/2023   • Chronic anticoagulation 04/26/2023   • Stroke ([place] [address] ) 04/26/2023   • Hyponatremia 04/26/2023   • Middle cerebral artery stenosis, right 04/17/2023   • Left posterior MCA stroke - etiology unclear at this time 04/17/2023   • Chronic low back pain 04/16/2023   • Hypertensive encephalopathy, transient 01/12/2023   • Snoring 08/10/2020   • Memory deficits 08/10/2020   • Chronic ischemic right MCA stroke 08/06/2019   • Status post placement of implantable loop recorder 04/06/2019   • Type 2 diabetes mellitus ([facility] ) 04/03/2019   • Anxiety and depression 03/29/2019   • Insomnia 03/29/2019   • Fall 03/28/2019   • 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 ([facility] ) 03/28/2019   • Urinary retention 03/27/2019   • At risk for venous thromboembolism (VTE) 03/26/2019   • Hypertriglyceridemia 03/26/2019   • Nicotine dependence 03/26/2019   • Bilateral carotid artery stenosis 03/26/2019   • Presbyopia 03/26/2019   • History of stroke 03/19/2019   • Headache 03/19/2019   • Primary hypertension 03/19/2019   • GERD (gastroesophageal reflux disease) 03/19/2019       Physical Therapy:    Weight Bearing Status: Full Weight Bearing  Transfers: Minimal Assistance, Moderate Assistance  Bed Mobility: Supervision  Amulation Distance (ft): 155 feet  Ambulation: Assist of 2  Assistive Device for Ambulation: Hand Hold Assistance  Wheelchair Mobility Distance: 0 ft  Number of Stairs: 0  Discharge Recommendations: Home with: (pedning outcome SNF vs family support)  DC Home with[de-identified] 24 Hour Assisteance, 24 Hour Supervision, Family Support      pt requires 2 person assist to complete functional activities safely requiring use of AD, see above info for details of PT evaluation  Pt is a good rehab candidate with anticipated min A goals at least for household distance mobilities using Least restrictive AD with ELOS of 4 weeks  Skilled PT will work on therapeutic exercises, therapeutic activities, NMR, w/c mobility and gait training to improve overall functional indep in order for pt to return home safely with reduce risk for falls  Pt cont to need ModA x2 for all xfers SPT and functional mobility with HHA x2 for NPP with WC follow   Pt cont to lean and veer right side and cont with poor vision deficits and awareness shawn on right side  Pt will cont NMR/NPP   w/c mobility and gait training to improve overall functional indep in order for pt to return home safely with reduce risk for falls  05/30/2023    Pt overall bed mobility at S with bed rails with VC 's and CS d/t fall risk and impulsive  Pt also HHAx2 during NPP ambulation 150 -200 feet , possible using RW for SPT and short ambulation, however pt needs to cont with NPP and possible F/T if dc to home with family support or pending SNF placement   Awaiting recommendation form team  Pt will need DME's WC RW and 1st floor set up if possible            Occupational Therapy:  Eating: Minimal Assistance  Grooming: Supervision  Bathing: Minimal Assistance  Bathing: Minimal Assistance  Upper Body Dressing: Minimal Assistance  Lower Body Dressing: Maximum Assistance  Toileting: Maximum Assistance  Toilet Transfer: Total Assistance, Assist of 2  Cognition: Exceptions to WNL  Cognition: Decreased Memory, Decreased Attention, Decreased Executive Functions, Decreased Comprehension, Decreased Safety  Orientation: Person, Place  Discharge Recommendations: Other (pending progress- anticipate SNF as per discussion with pts wife)       Pt is demonstrating fair progress with occupational therapy and is progressing toward long term goals for ADL, IADL, and functional transfers/mobility  Pts long term goals for ADLs are Minimum assistance and Moderate assistance with wheelchair  Pt continues to present with impairments in activity tolerance, endurance, standing balance/tolerance, sitting balance/tolerance, UE strength, UE ROM, FMC, GMC, memory, insight, safety , judgement , attention , sequencing , sensation , visual perceptual skills , R/L discrimination , task initiation , task termination , (R) attention, proprioception , coping skills , communication, and interpersonal skills  Occupational performance remains limited by fatigue, (R) hemiparesis, (R) visual deficits , risk for falls, and home environment  Family training/education will be required prior to D/C  Pt will continue to benefit from skilled acute rehab OT services to address above mentioned barriers and maximize functional independence in baseline areas of occupation to meet established treatment goals with overall decreased burden of care   Plan of care to continue to focus on ADL Retraining , LB Dressing, UB dressing, George Dressing Techniques, Functional Cognition, Functional Attention, Assessment of Cognitive function, Short Term memory, MOCA, ACLS, Standing tolerance, Standing balance , UE NMR right, midline awareness, Fine motor coordination, Gross motor coordination, Fine motor strengthening , Gross motor strengthening , R attention, Visual perceptual skills, Visual scanning, DME training/education, Family training/education, Energy conservation training/education, healthy coping education, Leisure and social pursuits, community re-integration, sitting balance, and Core/trunk control/strengthening   Goals for the upcoming week are: increasing fxl transfers and R attention for ADL completion  Anticipate Re-team at this time  Speech Therapy:  Mode of Communication: Verbal  Speech/Language: Expressive Aphasia (and receptive)  Cognition: Exceptions to WNL  Cognition: Decreased Memory, Decreased Executive Functions, Decreased Attention, Decreased Comprehension, Decreased Safety  Orientation: Person, Place, Time, Situation  Swallowing: Within Defined Limits  Diet Recommendations: Regular Diet, Thin  Discharge Recommendations: Blanca Peoples evaluation was completed on initial evaluation where pt completed the Bedside WAB in which overall bedside aphasia score deems pt to demonstrate severely impaired language deficits  Additionally, pt also demonstrates Broca's type aphasia as per Bedside Aphasia Classification  Overall, pt is presenting with moderate to sever receptive language deficits and severe expressive language/communication deficits  Pt's speech is characterized by anomia, presence of paraphasias and use of neologisms as pt does present with some components of apraxia of speech  Pt at times able to elicit functional phrases and short sentences but is most often limited by decreased verbal expression  Regarding comprehension, pt benefits from the use of yes/no questions, along with direct commands/instructions   In additional to the above language deficits, pt presents with cognitive linguistic deficits with barriers presenting as deficits in the following areas: attention, processing, STM recall, working memory, [employer] recall, orientation, problem solving, executive functions, safety awareness and insight into deficits  Pt is currently functioning at mod assist for comprehension, max assist for expression, max assist for problem solving and max to mod assist for memory  Levels of fatigue are also noted to impact both functional communication and cognitive linguistic skills  Additionally, pt was evaluated for swallow function where he is presenting with functional oropharyngeal swallow skills across baseline diet of regular solids/thin liquids, therefore no further dysphagia therapy is warranted at this time  However, pt is recommended for further skilled SLP services during acute rehab stay in order to improve both expressive/receptive language skills and to maximize overall functional communication abilities  Update from week of 5/23/2023: Pt conts to be followed for skilled SLP services targeting receptive and expressive communication needs  Pt has been making good progress towards his goals, improving in his independent communication skills for basic wants and needs  Pt conts with moderate receptive and moderate to severe expressive deficits at this time  Pt is improving in verbal expression skills of biographical and situational orientation information, benefitting most from phonemic and phrase completion cues  Pt also benefits from simple yes/no questions as well as one step following commands  However written expression skills still require mod to max cues for copying, suspect some Right inattention visual skills impacting this as well  Pt is highly impacted by fatigue with tasks, and benefits from breaks, increased cuing and change of task when increased frustration occurs  Family education was initiated with wife over the phone in regards to current communication strategies- wife reports slow improvement in speech each day  Pt currently funcitoning at max A problem solving and expression, mod A memory and comprehension   Barriers cont to include decreased ST/working memory, expressive/receptive aphasia, problem solving, safety, Right visual inattention/neglect, and 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 as well  Pt will cont to benefit from further skilled SLP services targeting speech/language/cognitive skills for increased independence and decreased burden of care upon safe discharge to next level of care  Update from week of 5/30/2023: Pt conts to be followed for skilled SLP services targeting receptive and expressive communication needs  Pt has been making good progress towards his goals, improving in his independent communication skills for basic wants and needs  Pt's overall verbal expression is improving but still requiring moderate assistance with multimodal cuing (sentence completion, initial phoneme, phonetic placement, semantic)  Written expression still conts to be severely impair and difficult to produce with component of motor apraxia as well  Pt's reading comprehension is increasing as well at the word level as well as his auditory comp improving for basic conversation, yes/no questions and following directions  Pt currently functioning at max A problem solving and memory, mod A comprehension and expression  Barriers as well include ecreased ST/working memory, expressive/receptive aphasia, problem solving, safety, Right visual inattention/neglect, and 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        Nursing Notes:  Appetite: Good  Diet Type: Cardiac, Diabetic                      Diet Patient/Family Education Complete: No (ongoing)                            Bladder: Catheter  Catheter Type: Cortez  Bladder Patient/Family Education: No (jo inseted)  Bowel:  Incontinent     Bowel Patient/Family Education: Yes  Pain Location/Orientation: Orientation: Right, Location: Leg  Pain Score: 0                       Hospital Pain Intervention(s): MD notified (Comment) (Dr Meg Garsia notified via tiger text and did speak with pt re: this matter during session)  Pain Patient/Family Education: Yes  Medication Management/Safety  Injectable:  (heparin- will not need at d/c)  Safe Administration: No  Reason for non-safe administration: will need assist at d/c  Medication Patient/Family Education Complete: No    Pt with change in mental staus and Acute multifocal ischemic strokes Occurred in different arterial distributions IVY was negative for thrombus/PFO Felt not to be vasculitis and negative by a-gram 5/4/23 Continue ASA/Brilinta Continue statin Left ICA stenosis- Was noted to have all of the CVAs in last month have been in left anterior circulation S/p PTA/stent 5/4/23 Recheck carotid doppler 6 weeks and see NS Continue AP/statin Right ICA stenosis- To followup with Vasc surgery as OP Continue AP/statin LOOP recorder  Was placed 3/2019 Neuro wants it replaced = for OP to Cardiology  Leukocytosis- Had no fevers CXR and Procal were normal HTN-Home:  Toprol 100mg qd/Norvasc 10mg qd/Losartan 50mg qd/HCTZ 25mg qd/Hydralazine 25mg TID Here:  Toprol 100mg qd/Losartan 50mg qd/HCTZ 12 5mg qd HCTZ was decreased to 12 5mg qd today before transfer here to [facility]; stopped  starting 5/16/23 Pre DM-HbA1C 5 8 Takes Metformin at home but currently not on it in hospital-Wife didn't want him to have Accuchecks noting that he was pre-DM so they were stopped in hospital   The BSs had been very normal in acute anyway Will watch fastings with BMPs For DM diet which he had not been on in acute  Dr Marin Morse spoke with him about that and he was ok with that plan Depression-Continue celexa as at home Memory loss- Continue Aricept as at home He has had memory issues since his CVA in 2019 Urine retention-Has jo- Continue Flomax   Chronic LBP- MRI showed advanced multilevel spondylosis, slightly progressed on the right at L2-3 compared to the prior study but otherwise stable  AAA- Found on L-spine MRI Measures 3 4 cm  Hyponatremia-Na 132 Abnormal creatinine-Stop HCTZ  Will give 1 liter NSS x 1 l  BMP AM and hold Cozaar until see labs tomorrow 5/16/3  Pt has jo- is inc BM  Pt requires alarms for safety  5/24--Pt cont to require med adjustments for BP control  Pt remains inc of bowel  Pt failed void trial and jo reinserted by urology  Blood sugar checks stable and d/c  This week we will encourage independence with ADLs  We will monitor labs and vital signs  WE will educate pt/family about repositioning to prevent skin breakdown  We will assist w repositioning and perform routine skin checks  We will monitor for adequate pain control  We will monitor for constipation and medicate pt as ordered  We will increase safety awareness and keep pt free from falls  Case Management:     Discharge Planning  Living Arrangements: Lives w/ Spouse/significant other  Support Systems: Spouse/significant other  Assistance Needed: Unknown  Type of Current Residence: Other (Comment)  Current Home Care Services: No  Pt is making gains however spouse is concerned about the level of assist potentially needed on dc, and is now preferring pt continue with inpat services at a subacute setting  Anticipate dc within the week to selected facility  Is the patient actively participating in therapies?  yes  List any modifications to the treatment plan: none    Barriers Interventions   New found stroke Neurology consult; CT angio head/neck ordered;    Urinary retention  Jo, continue to assess for appropriate of void trial, urine culture is pending    Bowel incontinence  toileting program    Expressive and receptive aphasia  Speech therapy exercises and pt/family education   Impaired cognition - attention, short-term memory Cognitive retraining    Impaired motor planning and ataxia, as well as coordination  Neuromuscular re-training; gait training    R sided weakness and inattention  Neuro retraining; strengthening; perceptual retraining             Is the patient making expected progress toward goals? yes  List any update or changes to goals: MRI new found stroke, now undergoing additional testing and Neurology is following     Medical Goals: Patient will be medically stable for discharge to [facility] upon completion of rehab program and Patient will be able to manage medical conditions and comorbid conditions with medications and follow up upon completion of rehab program    Weekly Team Goals:   Rehab Team Goals  ADL Team Goal: Patient will require assist with ADLs with least restrictive device upon completion of rehab program  Transfer Team Goal: Patient will require assist with transfers with least restrictive device upon completion of rehab program  Locomotion Team Goal: Patient will require assist with locomotion with least restrictive device upon completion of rehab program  Cognitive Team Goal: Patient will require supervision for basic and complex tasks upon completion of rehab program    Discussion: Currently pt is functioning at overall mod assist for ADLs and total assist for gait/ambulation, min/mod assist x1 for transfers  Pt has progressed from assist x2 and angela lift to assist x1 for ADLs and transfers  Mod/max assist for language 2* aphasia  Pt found with new stroke on MRI and is pending further testing and recommendations from Neurology  Plan will continue to be for subacute rehab once pt is medically stable  D/C anticipated towards end of this week / beginning of next week pending medical status  Anticipated Discharge Date:  Pending subacute rehab   [facility] Team Members Present:   The following team members are supervising care for this patient and were present during this Weekly Team Conference      Physician: Dr Mello Rdz MD  : Trisha Ng MS, OTR/L  Registered Nurse: Karina Luevano RN  Physical Therapist: Shay العراقي DPT  Occupational Therapist: Kathleen Medina MS, OTR/L  Speech Therapist: Eduardo Acharya [address], [id number] [facility]

## 2023-05-31 NOTE — ASSESSMENT & PLAN NOTE
58year old male with HTN, HLD, controlled DM2, prior R MCA CVA s/p thrombectomy in March 2019 w/ no etiology found despite years of loop recorder (and was maintained on Plavix 75mg after that), known severe R M1 stenosis, prior tobacco use, recent L MCA stroke (had aphasia and was empirically AC eliquis 5mg BID w/ ASA 81mg at that time due to ESUSx2) 04/16/2023, readmitted for stroke alert 4/26, noted multifocal strokes, right and left anterior circulation maintained on eliquis and Plavix, on 5/1/23 patient noted to have worse in exam, unsteady walk, right sided weakness, repeat MRI revealed 5/2/23- Worsened recent infarcts with new areas of acute ischemia in similar distribution involving left parietotemporal and occipital periventricular and deep white matter, underwent cerebral angiogram on 5/4, negative for vasculitis, s/p stent in left carotid artery, he was started on aspirin and brilinta and eliquis was d/sylvia  Pt d/sylvia to rehab on 5/12/23  Neurology reconsulted on 5/30 as pt had repeat CT head after he was bit off and possibly worsening dysarthria, exam otherwise unchanged also positive for UTI     5/30/23- MRI brain Similar areas of left parietal occipital recent ischemia/infarct  Some new areas of acute to subacute left parietal/occipital acute to subacute ischemia  Possibly evaluation of similar strokes    Exam-patient oriented x 2, right homonymous hemianopia, mixed aphasia, expressive worse than receptive, right-left confusion, tremors when doing finger-nose testing on left, no focal weakness on strength testing       Impression- New DWI changes in similar vascular distribution as before likely the evolution of same stroke as seen previously vs vessel to vessel ds, adding Cilastazole   His  possible worsening dysarthria and bit of could be related to UTI    Plan-  · Start Cilostazol 100 mg twice daily  · Continue aspirin 81 mg daily and Brilinta 90 mg twice daily  · Discussed with patient's wife over the phone who is in agreement with the plan   · Discussed with Dr Ariel Luevano and Dr Rupesh Tijerina about the plan   · Need [facility]       Prior work up-     4/26 CTA- Stable moderate (60 to 65%) stenosis in the bilateral internal carotid arteries  Stable severe stenosis distal right M1 segment  Stable severe stenosis proximal left M2    Prior work-up including IVY (no PFO) and loop recorder for 3 years without evidence of Afib  CT of chest abdomen and pelvis with no evidence of malignancy 04/17/2023  Thrombosis panel done in 04/18/2023 that was only abnl for AT3 that was low in the acute setting

## 2023-05-31 NOTE — PROGRESS NOTES
PMR on-call note:    Notified by rads of immediate findings:    MRI brain without contrast  Similar areas of left parietal occipital recent ischemia/infarct  Some new areas of acute to subacute left parietal/occipital acute to subacute ischemia  Findings suggesting normal pressure hydrocephalus  Spoke with neuro earlier in day who recommended MRI brain; Official Neuro c/s placed  Communicated via TT with neuro-on-call Masha hx and MRI findings  She recommends normal BP parameters as seems more subacute and no acute changes in antithrombotics with neuro attending follow-up by day team later today  Will continue neuro checks/freq vital checks  Continue BP meds with holding parameters

## 2023-05-31 NOTE — PROGRESS NOTES
NEUROLOGY RESIDENCY PROGRESS NOTE     Name: Ajith Kemp   Age & Sex: 58 y o  male   MRN: [id number]  Unit/Bed#: -01   Encounter: [id number]        ASSESSMENT & PLAN     * Stroke [facility])  Assessment & Plan  58year old male with HTN, HLD, controlled DM2, prior R MCA CVA s/p thrombectomy in March 2019 w/ no etiology found despite years of loop recorder (and was maintained on Plavix 75mg after that), known severe R M1 stenosis, prior tobacco use, recent L MCA stroke (had aphasia and was empirically AC eliquis 5mg BID w/ ASA 81mg at that time due to ESUSx2) 04/16/2023, readmitted for stroke alert 4/26, noted multifocal strokes, right and left anterior circulation maintained on eliquis and Plavix, on 5/1/23 patient noted to have worse in exam, unsteady walk, right sided weakness, repeat MRI revealed 5/2/23- Worsened recent infarcts with new areas of acute ischemia in similar distribution involving left parietotemporal and occipital periventricular and deep white matter, underwent cerebral angiogram on 5/4, negative for vasculitis, s/p stent in left carotid artery, he was started on aspirin and brilinta and eliquis was d/sylvia  Pt d/sylvia to rehab on 5/12/23  Neurology reconsulted on 5/30 as pt had repeat CT head after he was bit off and possibly worsening dysarthria, exam otherwise unchanged also positive for UTI     5/30/23- MRI brain Similar areas of left parietal occipital recent ischemia/infarct  Some new areas of acute to subacute left parietal/occipital acute to subacute ischemia  Exam-patient oriented x 2, mixed aphasia, expressive worse than receptive, right-left confusion, tremors when doing finger-nose testing on left, no focal weakness on strength testing  Impression- New stroke in similar vascular distribution as before likely related to vessel to vessel disease, will get CTA to make sure his stent is patent and possible change in his antiplatelet based on CTA       Plan-  · CTA head and neck    · After CTA review will decide if pt needs to be on Cilastazole as well and discuss further with neurosurgery  · Hydrate well   · Will discuss with patient's wife once CTA is done    · Discussed with PMR  · Need [facility] with [facility] as [facility]       Prior work up-      CTA- Stable moderate (60 to 65%) stenosis in the bilateral internal carotid arteries  Stable severe stenosis distal right M1 segment  Stable severe stenosis proximal left M2    Prior work-up including IVY (no PFO) and loop recorder for 3 years without evidence of Afib  CT of chest abdomen and pelvis with no evidence of malignancy 2023  Thrombosis panel done in 2023 that was only abnl for AT3 that was low in the acute setting  SUBJECTIVE     Patient was seen and examined  No acute events overnight  Patient reports feeling well, he denies any acute complaints to me at this time  His mental status seems to be at baseline    He was able to tell me his name, that he is in a hospital, he follows simple commands, need more directions for complex commands    Pertinent Negatives include: numbness, weakness, speech or visual changes         OBJECTIVE     Patient ID: Luci Sellers is a 58 y o  male  Vitals:    23 1400 23 2100 23 0444 23 0900   BP: 132/64 123/59 152/69 134/72   BP Location: Right arm Right arm Right arm Right arm   Pulse: 79 71 70 80   Resp: 18 16 20 18   Temp: 98 1 °F (36 7 °C) 98 3 °F (36 8 °C) 98 2 °F (36 8 °C) 98 °F (36 7 °C)   TempSrc: Oral Oral Oral Oral   SpO2: 95% 95% 97%    Weight:   95 3 kg (210 lb 1 6 oz)       Temperature:   Temp (24hrs), Av 2 °F (36 8 °C), Min:98 °F (36 7 °C), Max:98 3 °F (36 8 °C)    Temperature: 98 °F (36 7 °C)      Physical Exam  Vitals and nursing note reviewed  Constitutional:       Appearance: He is well-developed  HENT:      Head: Normocephalic and atraumatic     Eyes:      Conjunctiva/sclera: Conjunctivae normal  Cardiovascular:      Rate and Rhythm: Normal rate  Pulmonary:      Effort: Pulmonary effort is normal  No respiratory distress  Abdominal:      Palpations: Abdomen is soft  Tenderness: There is no abdominal tenderness  Musculoskeletal:      Cervical back: Neck supple  Skin:     General: Skin is warm and dry  Capillary Refill: Capillary refill takes less than 2 seconds  Neurological:      Mental Status: He is alert  Psychiatric:         Mood and Affect: Mood normal           Neurological Examination:     Mental Status: The patient was awake, alert, attentive, able to tell me his name, that he is in the hospital, unable to tell me the date month or the year, when given cues he said maybe summer, he is still aphasic able to name few objects like glove, glasses- unable  Mixed aphasia some difficulty following complex, to follow simple commands without difficulty  No dysarthria noted    Cranial Nerves:   I: smell Not tested   II: visual fields Full to confrontation  Pupils equal, round, reactive to light with normal accomodation  III,IV,VI: extraocular muscles EOMI, no nystagmus   V: Sensation in the V1 through V3 distributions intact to light touch bilaterally  VII: Face is symmetric with no weakness noted  VIII: Audition intact   IX/X: Uvula midline  XI: Trapezius strength intact  XII: Tongue midline with no atrophy or fasciculations with appropriate movement  Motor Examination:   No pronator drift  Bulk: Normal  No atrophy Tone: Normal  Fasciculations: None        Deltoid Biceps Triceps WE   WF   FF IO     Right        5         5          5         5      5      5   5        Left           5        5          5          5      5     5   5                       IP        Quad   Ham     TA       Gastroc   Right      5            5          5         5                5  Left         5            5         5         5                5    Mild tremors noted when doing finger-nose "testing using left arm    Clonus: None    Pathological Reflexes:  Hoffmans: negative  Babinsky: negative  Jaw Jerk: negative    Coordination: Tremors noted in left hand when doing finger-nose testing    Sensory: Normal sensation to light touch,  Has some right left confusion  Able to feel touch in bilateral when giving stimuli at the same time  LABORATORY DATA     Labs: I have personally reviewed pertinent reports  Results from last 7 days   Lab Units 05/28/23  0507 05/25/23  0549   EOS PCT % 7* 7*   HEMATOCRIT % 36 5 35 1*   HEMOGLOBIN g/dL 12 0 12 0   MONOS PCT % 10 11   NEUTROS PCT % 52 53   PLATELETS Thousands/uL 277 288   WBC Thousand/uL 10 69* 11 93*      Results from last 7 days   Lab Units 05/28/23  0507 05/25/23  0549   BUN mg/dL 19 23   CALCIUM mg/dL 9 1 9 0   CHLORIDE mmol/L 109* 109*   CO2 mmol/L 26 25   CREATININE mg/dL 1 04 1 02   POTASSIUM mmol/L 3 6 3 7   SODIUM mmol/L 138 136     Results from last 7 days   Lab Units 05/28/23  0507   MAGNESIUM mg/dL 2 3                        IMAGING & DIAGNOSTIC TESTING     Radiology Results: I have personally reviewed pertinent reports  MRI brain wo contrast   Final Result by Jez Sanchez MD (05/30 3750)      Similar areas of left parietal occipital recent ischemia/infarct  Some new areas of acute to subacute left parietal/occipital acute to subacute ischemia  Findings suggesting normal pressure hydrocephalus  Findings were marked as \"immediate\"in Epic and will now be related to the ordering physician or covering clinical team by the radiology liaison  Workstation performed: [id number]         CT head wo contrast   Final Result by Joseline Mata MD (05/30 1727)      Slight interval progression and extension of the nonhemorrhagic acute to early subacute left cerebral infarction noted on the recent MRI study  Mild regional sulcal crowding but no significant mass effect on the underlying ventricular system or evidence    of herniation      " Redemonstrated disproportionate ventriculomegaly to sulcal prominence which can be seen in patients with normal pressure hydrocephalus  Background cerebral white matter changes consistent with moderate chronic microangiopathic disease  The study was marked in [facility] for immediate notification  Workstation performed: [id number]         CTA head and neck w wo contrast    (Results Pending)       Other Diagnostic Testing: I have personally reviewed pertinent reports        ACTIVE MEDICATIONS     Current Facility-Administered Medications   Medication Dose Route Frequency   • acetaminophen (TYLENOL) tablet 975 mg  975 mg Oral TID PRN   • aspirin (ECOTRIN LOW STRENGTH) EC tablet 81 mg  81 mg Oral Daily   • atorvastatin (LIPITOR) tablet 40 mg  40 mg Oral Daily   • bisacodyl (DULCOLAX) rectal suppository 10 mg  10 mg Rectal Daily PRN   • calcium carbonate (TUMS) chewable tablet 1,000 mg  1,000 mg Oral Daily PRN   • cephalexin (KEFLEX) capsule 500 mg  500 mg Oral Q8H [address]   • citalopram (CeleXA) tablet 20 mg  20 mg Oral Daily   • donepezil (ARICEPT) tablet 5 mg  5 mg Oral BID   • heparin (porcine) subcutaneous injection 5,000 Units  5,000 Units Subcutaneous Q8H [address]   • lidocaine (LIDODERM) 5 % patch 1 patch  1 patch Topical Daily   • lidocaine (URO-JET) 2 % urethral/mucosal gel   Topical Q4H PRN   • losartan (COZAAR) tablet 50 mg  50 mg Oral Daily   • menthol-methyl salicylate (BENGAY) 02-71 % cream   Apply externally 4x Daily PRN   • methocarbamol (ROBAXIN) tablet 500 mg  500 mg Oral Q6H PRN   • metoprolol succinate (TOPROL-XL) 24 hr tablet 100 mg  100 mg Oral Daily   • ondansetron (ZOFRAN-ODT) dispersible tablet 4 mg  4 mg Oral Q6H PRN   • oxyCODONE (ROXICODONE) IR tablet 5 mg  5 mg Oral Q6H PRN   • oxyCODONE (ROXICODONE) split tablet 2 5 mg  2 5 mg Oral Q6H PRN   • pantoprazole (PROTONIX) EC tablet 20 mg  20 mg Oral Early Morning   • polyethylene glycol (MIRALAX) packet 17 g  17 g Oral QAM   • senna (SENOKOT) tablet 17 2 mg  2 tablet Oral Daily PRN   • senna-docusate sodium (SENOKOT S) 8 6-50 mg per tablet 2 tablet  2 tablet Oral BID   • sodium chloride 0 9 % infusion  75 mL/hr Intravenous Once   • tamsulosin (FLOMAX) capsule 0 8 mg  0 8 mg Oral Daily With Dinner   • ticagrelor (BRILINTA) tablet 90 mg  90 mg Oral Q12H [address]   • traZODone (DESYREL) tablet 25 mg  25 mg Oral HS       Prior to Admission medications    Medication Sig Start Date End Date Taking?  Authorizing Provider   acetaminophen (TYLENOL) 325 mg tablet Take 3 tablets (975 mg total) by mouth every 8 (eight) hours 5/12/23   Renita Bar MD   aspirin (ECOTRIN LOW STRENGTH) 81 mg EC tablet Take 1 tablet (81 mg total) by mouth daily Do not start before April 19, 2023 4/19/23   Aminah WHYTE PA-C   atorvastatin (LIPITOR) 40 mg tablet Take 40 mg by mouth daily with breakfast    Historical Provider, MD   citalopram (CeleXA) 20 mg tablet Take 20 mg by mouth daily    Historical Provider, MD   donepezil (ARICEPT) 5 mg tablet TAKE 1 TABLET TWICE A DAY 2/7/23   Yuri Snyder MD   hydrochlorothiazide (HYDRODIURIL) 12 5 mg tablet Take 1 tablet (12 5 mg total) by mouth daily Do not start before May 13, 2023  5/13/23   Renita Bar MD   lidocaine (Lidoderm) 5 % Apply 1 patch topically over 12 hours daily Remove & Discard patch within 12 hours or as directed by MD 4/26/23   Marcella Cabrales DO   losartan (COZAAR) 50 mg tablet Take 50 mg by mouth daily    Historical Provider, MD   metFORMIN (GLUCOPHAGE) 500 mg tablet Take 1 tablet (500 mg total) by mouth daily with breakfast 4/6/19   She King MD   methocarbamol (ROBAXIN) 500 mg tablet Take 1 tablet (500 mg total) by mouth every 6 (six) hours as needed for muscle spasms 5/12/23   Renita Bar MD   metoprolol succinate (TOPROL-XL) 100 mg 24 hr tablet Take 100 mg by mouth daily    Historical Provider, MD   omeprazole (PriLOSEC OTC) 20 MG tablet Take 20 mg by mouth daily Historical Provider, MD   polyethylene glycol (MIRALAX) 17 g packet Take 17 g by mouth every morning Do not start before May 13, 2023  5/13/23   Marie Pineda MD   senna-docusate sodium (SENOKOT S) 8 6-50 mg per tablet Take 2 tablets by mouth 2 (two) times a day 5/12/23   Marie Pineda MD   sildenafil (REVATIO) 20 mg tablet TAKE 2 3 TABLETS BY MOUTH AS NEEDED FOR SEXUAL ACTIVITY 7/12/19   Historical Provider, MD   tamsulosin (FLOMAX) 0 4 mg Take 0 4 mg by mouth daily with breakfast    Historical Provider, MD   ticagrelor (BRILINTA) 90 MG Take 1 tablet (90 mg total) by mouth every 12 (twelve) hours 5/12/23   Marie Pineda MD         VTE Pharmacologic Prophylaxis: Heparin  VTE Mechanical Prophylaxis: sequential compression device    ==  MD [facility]'s Neurology Residency, PGY-4

## 2023-05-31 NOTE — PROGRESS NOTES
Physical Medicine and Rehabilitation Progress Note  Denis Menjivar 58 y o  male MRN: [id number]  Unit/Bed#: -01 Encounter: [id number]      Assessment & Plan:     Decline in ADLs and mobility: Functional assessment - improving slowly         FIM  Care Score  Admit Score Recent Score    Total assist  1-100% or 2p    Tot ADLs    Max assist 2-51-75%    Sub  LBD   Mod assist 3- 26-50%  Par  Bathing   Min assist 3- 25% or < Par  UBD   CG assist 4  TA     Sup/Setup 4-5 Sup     Mod-I/Indep 6 MI      Transfers  Total assist  Gen - mod assist  Toilet - Total     Ambulation   Total assist  Total assist     Stairs   Total assist/NT      Goal: CGA-supervision for most ADLs and  for mobility  Major barriers:  Impaired cognition, speech, balance, deconditioning  Dispo: Likely SNF now      * Stroke [facility])  Assessment & Plan  5/30 - neuro exam fairly stable from late last week; feels fairly tired; PT felt function stable as well; strength near full throughout c/ subtle R sided weakness; fairly stable aphasia/apraxia; able to follow most simple commands sometimes with additional time for correction but close to recent baseline; some visual deficits - mostly R sided HH; -140s  - discussed with neuro on-call Dr Tom Denny who recommended MRI brain without contrast  - monitor neuro exam and vitals closely in interim and continue aspirin/brilinta, statin as previously recommended  - decrease Trazodone to 25mg HS  - Treat questionable UTI with empiric Keflex - await   5/30 - Notified of immediate findings by radiology in am   5/29 - patient reported feeling off that may have started 5/28 - like he did when he had prior CVA with some worsening speech but gross neuro exam fairly stable; felt more tired   5/29 - CTH -  Slight interval progression and extension of the nonhemorrhagic acute to early subacute left cerebral infarction noted on the recent MRI study   Mild regional sulcal crowding but no significant mass effect on the underlying ventricular system or evidence of herniation  Redemonstrated disproportionate ventriculomegaly to sulcal prominence which can be seen in patients with normal pressure hydrocephalus  Background cerebral white matter changes consistent with moderate chronic microangiopathic disease  Recent multifocal ischemic infarcts in different distributions of unclear etiology   · Imaging revealed multifocal strokes; underwent L ICA PTA/stenting 5/4 also hx of R ICA stenosis   · Seen by neuro and NSx and eventually switched from his Eliquis/aspirin to aspirin and [place] was felt not to be cardioembolic by neurosurgery  Ginny Batres was a question of vasculitis as the etiology but Neurosurgery saw him in consult and felt that it was likely related to atherosclerotic and carotid disease and they stopped the Eliquis and placed back on ASA  Neuro did not feel he needed to go back on full A/C as well  · Has loop recorder placed 2019 and neuro wants OP replacement of loop recorder - OP cards f/u   · CT of the chest abdomen pelvis without evidence of malignancy done on the last admission on 4/17  · A-gram not c/w vasculitis   · Thrombosis panel done in 04/18/2023 that was only abnl for AT3 that was low in the acute setting and needs to be repeated in future  Repeat OP thrombosis panel thru neuro or hematology  · Recent IVY with no PFO  · Previous TTE 04/18/23 showed ED of 55% and nl wall motion and mildly dilated L atrium  · Continue Brilinta and aspirin as well as statin for secondary stroke prophylaxis   · Continue Physical therapy, Occupational Therapy, speech therapy  · Monitor neuro exam closely       Bilateral carotid artery stenosis  Assessment & Plan  · Status post left ICA angioplasty and stent placement neurovascular service  · Per IM- S/p L ICA PTA/stent 5/4/23;  Recheck carotid doppler 6 weeks and see NS - has OP follow-up with Dr Dorian Joy 6/26  · Continue Brilinta, aspirin and statin  · BP mgmt per IM here  · OP vasc sx follow-up      Memory deficits  Assessment & Plan  · Hx of multiple strokes and some cog deficits and residual aphasia/apraxia  · He does still have some aphasia and apraxia which does limit some history taking although with time basic communication is fairly adequately achieved    · Patient/wife would like to have patient sign POA to her this week with  - he may still have adequate MDM capacity for this in spite of some deficits - would recommend neuropsych c/s to confirm  · Started on Aricept due to memory difficulties after stroke in 2019  · Currently on 5 mg twice daily  · Sees Dr Jose Buckley in outpatient neurology    Anxiety and depression  Assessment & Plan  Mood down at times   Continues to sleep better, tolerating med regimen  Anxiety, depression, significant insomnia with difficulty adjusting   Provided additional supportive counseling  Neuropsych c/s 5/22  Adjustment Disorder with Anxiety and Depression  R/O Post Stroke Depression  • Supportive psychotherapy, utilizing CBT and mindfulness strategies  • DBT distress tolerance techniques  to improve coping and mood  • Meditation and relaxation training  Continue chronic home Celexa 20mg qday   Continue Trazodone 50mg at bedtime  - Monitor for excessive serotonin - not appreciable thus far    -Monitor for signs and symptoms of excessive serotonin  -Monitor mood, efficacy, tolerance     Urinary retention  Assessment & Plan  - Urology c/s 5/19 for ongoing retention > jo re-inserted > continue for now with pending U/C and active med issues   - Continue flomax to 0 8mg qday  - UA c/ reflex culture obtained 1/92 for AMS of uncertain etiology by on-call provider    - UA dirty with mod leukocytes, +nitrite, TMTC blood, WBC 10-20; UC pending    - Patient with increased leg spasms; pt afebrile; denies increased bladder/abdominal spasms; has ongoing mild leukocytosis of uncertain etiology    - Empirically treat with Keflex 500mg Q8H (discussed dosing with IM) and await UC   - Monitor for infection   - Optimal bowel mgmt, mobilize  - OP urology follow-up       Leukocytosis  Assessment & Plan  · WBC 12 9>11 9>10 69 on 5/29   · Comgmt with IM   · Monitor for s/s of infection or other etiologies; monitor vitals/labs   · Starting Empiric Keflex for questionable UTI     Hypertriglyceridemia  Assessment & Plan  Continue statin    Primary hypertension  Assessment & Plan  · Adequate control  · Mgmt per IM: ly, metoprolol succinate 100 mg daily as well as Cozaar 50 mg daily   · Hctz stopped    GERD (gastroesophageal reflux disease)  Assessment & Plan  Continue pantoprazole and as needed Tums    At risk for venous thromboembolism (VTE)  Assessment & Plan  SCDs, ambulation, and heparin       Abnormal laboratory test  Assessment & Plan  AT3 89% on 4/2023 lab per prior providers; can be low from recent stroke and not true AT3 def  - Recommend follow-up with neurology and repeat thrombosis panel as an outpatient     Hypokalemia  Assessment & Plan  · Remains improved    Chronic ischemic left MCA stroke  Assessment & Plan  · Recent left MCA stroke in the beginning of April at 4/16/2023 with aphasia and was empirically treated with Eliquis 5 mg twice daily as well as aspirin 81 mg due to embolic stroke of undetermined source per prior documentation  · Despite this we presented with additional strokes for this admission    History of tobacco use  Assessment & Plan  · Patient with a history of tobacco use yet quit in 2019  · Does not need nicotine patches    Prediabetes  Assessment & Plan  · Last hemoglobin A1c of 5 8  · Mgmt per IM during [facility]   · Started on consistent carbohydrate 3 diet here in addition to cardiac diet on admission to [facility]    Chronic low back pain  Assessment & Plan  · Adequate control  · APAP TID PRN - discussed with pt   · Robaxin 500mg Q6H PRN spasms   · Oxy 2 5-5mg Q6H PRN   · Continue lidocaine patch and aqua K-pad but not in the same area at the same "time    Chronic ischemic right MCA stroke  Assessment & Plan  · Patient with history of right MCA stroke back in March 2019 status post thrombectomy  · Had loop recorder placement without overt arrhythmia and was on Plavix 75 mg at that time  Also with known severe right M1 stenosis  · See \"Stroke\" mgmt above         Other Medical Issues:  • Monitor for     Follow-up providers and other issues to be followed up after discharge  PCP  Neuro  Cards  Vasc Sx     CODE: Level 1: Full Code    Restrictions include: Fall precautions    Objective: Allergies per EMR  Diagnostic Studies: Reviewed, no new imaging     CT head wo contrast   Final Result by Bhavesh Corrales MD (05/30 9154)      Slight interval progression and extension of the nonhemorrhagic acute to early subacute left cerebral infarction noted on the recent MRI study  Mild regional sulcal crowding but no significant mass effect on the underlying ventricular system or evidence    of herniation  Redemonstrated disproportionate ventriculomegaly to sulcal prominence which can be seen in patients with normal pressure hydrocephalus  Background cerebral white matter changes consistent with moderate chronic microangiopathic disease  The study was marked in [facility] for immediate notification              Workstation performed: [id number]           See above as well    Laboratory: Labs reviewed  Results from last 7 days   Lab Units 05/28/23  0507 05/25/23  0549   HEMATOCRIT % 36 5 35 1*   HEMOGLOBIN g/dL 12 0 12 0   WBC Thousand/uL 10 69* 11 93*     Results from last 7 days   Lab Units 05/28/23  0507 05/25/23  0549   BUN mg/dL 19 23   CHLORIDE mmol/L 109* 109*   CREATININE mg/dL 1 04 1 02   POTASSIUM mmol/L 3 6 3 7            Drug regimen reviewed, all potential adverse effects identified and addressed:    Current Facility-Administered Medications   Medication Dose Route Frequency Provider Last Rate   • acetaminophen  975 mg Oral TID PRN Mani Martínez MD   " • aspirin  81 mg Oral Daily María Elena Blunt, DO     • atorvastatin  40 mg Oral Daily María Elena Blunt, DO     • bisacodyl  10 mg Rectal Daily PRN María Elena Blunt, DO     • calcium carbonate  1,000 mg Oral Daily PRN María Elena Blunt, DO     • cephalexin  500 mg Oral [facility] She King MD     • citalopram  20 mg Oral Daily María Elena Blunt, DO     • donepezil  5 mg Oral BID María Elena Blunt, DO     • heparin (porcine)  5,000 Units Subcutaneous [facility] María Elena Blunt, DO     • lidocaine  1 patch Topical Daily María Elena Blunt, DO     • lidocaine   Topical Q4H PRN She King MD     • losartan  50 mg Oral Daily She King MD     • menthol-methyl salicylate   Apply externally 4x Daily PRN Hany Webster MD     • methocarbamol  500 mg Oral Q6H PRN María Elena Blunt, DO     • metoprolol succinate  100 mg Oral Daily She King MD     • ondansetron  4 mg Oral Q6H PRN María Elena Blunt, DO     • oxyCODONE  5 mg Oral Q6H PRN María Elena Blunt, DO     • oxyCODONE  2 5 mg Oral Q6H PRN María Elena Blunt, DO     • pantoprazole  20 mg Oral Early Morning María Elena Blunt, DO     • polyethylene glycol  17 g Oral QAM María Elena Blunt, DO     • senna  2 tablet Oral Daily PRN María Elena Blunt, DO     • senna-docusate sodium  2 tablet Oral BID María Elena Blunt, DO     • tamsulosin  0 8 mg Oral Daily With [address], CRNP     • ticagrelor  90 mg Oral Q12H [facility] María Elena Blunt, DO     • traZODone  25 mg Oral HS She King MD         •  acetaminophen  •  bisacodyl  •  calcium carbonate  •  lidocaine  •  menthol-methyl salicylate  •  methocarbamol  •  ondansetron  •  oxyCODONE  •  oxyCODONE  •  senna          Chief Complaints:  Tired    Subjective: On eval, patient reports feeling a little tired but otherwise okay  He does still have some aphasia and apraxia which does limit some history taking although with time basic communication is fairly adequately achieved    Patient reports some difficulty with speech last several "day but denies significant changes in speech, strength, sensation, or vision  He reports increased cramping in both legs last few days as well  He reports still sleeping well and denies other complaints  ROS: A 10 point ROS was performed; negative except as noted above  Physical Exam:  05/30/23 0757 -- 72 -- 146/64 -- -- -- Lying   05/30/23 0612 97 8 °F (36 6 °C) 73 16 142/74 -- 96 % None (Room air) Lying     Vitals above reviewed on date of encounter    GEN:  Seen in gym and in room in NAD  HEENT/NECK: MMM  CARDIAC: Regular rate rhythm, no murmers, no rubs, no gallops  LUNGS:  clear to auscultation, no wheezes, rales, or rhonchi  ABDOMEN: Soft, non-tender, non-distended, normal active bowel sounds  EXTREMITIES/SKIN:  no calf edema, no calf tenderness to palpation  NEURO:   MENTAL STATUS: Some ongoing expressive>receptive aphasia/apraxia but near recent baseline and with time to self correct or with provider provided options can communicate with basic questions appropriately fair degree of the time; oriented to self, place, 2 days off from month, not year,; adequate wakefulness; stable attention, CN II-XII: Intact (some likely R HH) and Strength/MMT:  Near full with perhaps trace R sided weakness  PSYCH:  Affect:  Euthymic     HPI:  Per admitting provider   \"Constanza Cochran is a 58 y o  male with history of prediabetes, hypertension, depression, urinary retention on Flomax, carotid stenosis, depression, prior left MCA and right MCA stroke with aphasia and memory deficits now on Aricept who presented to the [address] on 4/26 for AMS witnessed by his wife with abnormal speech that was noted to be similar to his prior strokes  Of note he was recently in the hospital earlier in the month for a left MCA stroke  He also had a right MCA stroke status post thrombectomy back in 2019  He is a prior smoker and quit at the time of his initial stroke    Additionally he had hyponatremia " "which was felt to be due to HCTZ use and potentially poor oral intake, chronic low back pain  He was seen by neurology as well as neurosurgery and eventually switched from his Eliquis/aspirin to aspirin and Brilinta  It was felt not to be cardioembolic by neurosurgery  He was also not given TNK given his recent Eliquis use when he arrived  He had imaging prior on his last admission however now with new focus of diffusion abnormality in the right frontal parietal region and in the left periatrial and parieto-occipital region  No acute hemorrhage was seen  Had a carotid Doppler showing LICA 30-66%/KQRD <27%   IVY was done and did not show any PFO or thrombus  Given that patient was on Plavix when he had his stroke on 04/16/2023, he was placed on [place] was a question of vasculitis as the etiology but Neurosurgery saw him in consult and felt that it was likely related to atherosclerotic and carotid disease and they stopped the Eliquis and placed back on ASA   He had a cerebral arteriogram on 5/4 23 with a left ICA PTA/stent placement   There was no vasculitis noted    Per Neurology full anticoagulation did not need to be restarted  The patient was evaluated by the Rehabilitation team and deemed an appropriate candidate for comprehensive inpatient rehabilitation and admitted to the [facility] on 5/12/2023  2:35 PM\"    ** Please Note: Fluency Direct voice to text software may have been used in the creation of this document  **    I personally performed the required components and examined the patient myself in person on 5/30/23    Updated wife by phone      50 minutes or greater spent for this encounter which included a combination of face-to-face time with Siobhan Sultana and non-face-to-face time which in part specifically includes management of CVA, etc   Face-to-face time included extended discussion with patient regarding current condition, medical history, mood, medical/rehabilitation management, and " disposition  Non face-to-face time included coordination of care with patient's co-managing AP and/or physician(s) thru communication and review of their recent documentation as well as reviewing vitals, bowel/bladder function, recent labs, and notes from therapy, CM, and nursing

## 2023-05-31 NOTE — PROGRESS NOTES
Physical Medicine and Rehabilitation Progress Note  Renetta Or 58 y o  male MRN: [id number]  Unit/Bed#: -01 Encounter: [id number]      Assessment & Plan:     Decline in ADLs and mobility: Functional assessment -         FIM  Care Score  Admit Score Recent Score    Total assist  1-100% or 2p    Tot ADLs    Max assist 2-51-75%    Sub  Bathing, LBD   Mod assist 3- 26-50%  Par  UBD   Min assist 3- 25% or < Par     CG assist 4  TA     Sup/Setup 4-5 Sup     Mod-I/Indep 6 MI      Transfers  Total assist  Mod-significant assist     Ambulation   Total assist  Total assist     Stairs   Total assist/NT      Goal: CGA-supervision for most ADLs and  for mobility  Major barriers:  Impaired cognition, speech, balance, deconditioning  Dispo: SNF pending medical clearance      * Stroke [facility])  Assessment & Plan  Neuro exam stable; vitals stable   Neuro c/s 5/31 - await follow-up - neuro to discuss with NSx CTA/MRI brain and consider adding Cilastazole; hydrate well; recommend OP [facility] as well  5/31 - CTA H/N - CT brain: Subacute ischemia within the left hemisphere without mass effect or taiwo hemorrhagic transformation  Advanced chronic microangiopathic change within the brain parenchyma  CT angiogram: Stable atherosclerotic disease of the right carotid bifurcation including the ICA origin and midportion of the bulb, approximately 60 to 70% at the point of maximal stenosis  A carotid stent has been placed on the left with only mild residual narrowing within the proximal internal carotid artery  Stable severe stenosis of the distal right M1 segment with decreased size and number of M2 branches  5/30 - MRI Brain - Similar areas of left parietal occipital recent ischemia/infarct  Some new areas of acute to subacute left parietal/occipital acute to subacute ischemia  Findings suggesting normal pressure hydrocephalus    5/29 - patient reported feeling off that may have started 5/28 - like he did when he had prior CVA with some worsening speech but gross neuro exam fairly stable; felt more tired   5/29 - CTH -  Slight interval progression and extension of the nonhemorrhagic acute to early subacute left cerebral infarction noted on the recent MRI study  Mild regional sulcal crowding but no significant mass effect on the underlying ventricular system or evidence of herniation  Redemonstrated disproportionate ventriculomegaly to sulcal prominence which can be seen in patients with normal pressure hydrocephalus  Background cerebral white matter changes consistent with moderate chronic microangiopathic disease  Recent multifocal ischemic infarcts in different distributions of unclear etiology   · Imaging revealed multifocal strokes; underwent L ICA PTA/stenting 5/4 also hx of R ICA stenosis   · Seen by neuro and NSx and eventually switched from his Eliquis/aspirin to aspirin and [place] Bracket was felt not to be cardioembolic by neurosurgery  Ginny Batres was a question of vasculitis as the etiology but Neurosurgery saw him in consult and felt that it was likely related to atherosclerotic and carotid disease and they stopped the Eliquis and placed back on ASA  Neuro did not feel he needed to go back on full A/C as well  · Has loop recorder placed 2019 and neuro wants OP replacement of loop recorder - OP cards f/u   · CT of the chest abdomen pelvis without evidence of malignancy done on the last admission on 4/17  · A-gram not c/w vasculitis   · Thrombosis panel done in 04/18/2023 that was only abnl for AT3 that was low in the acute setting and needs to be repeated in future  Repeat OP thrombosis panel thru neuro or hematology  · Recent IVY with no PFO  · Previous TTE 04/18/23 showed ED of 55% and nl wall motion and mildly dilated L atrium     · Continue Brilinta and aspirin as well as statin for secondary stroke prophylaxis   · Continue Physical therapy, Occupational Therapy, speech therapy  · Monitor neuro exam closely       Bilateral carotid artery stenosis  Assessment & Plan  · Status post left ICA angioplasty and stent placement neurovascular service  · Per IM- S/p L ICA PTA/stent 5/4/23; Recheck carotid doppler 6 weeks and see NS - has OP follow-up with Dr Feliberto Whitlock 6/26  · Continue Brilinta, aspirin and statin  · BP mgmt per IM here  · OP vasc sx follow-up      Memory deficits  Assessment & Plan  · Hx of multiple strokes and some cog deficits and residual aphasia/apraxia  · He does still have some aphasia and apraxia which does limit some history taking although with time basic communication is fairly adequately achieved    · Patient/wife would like to have patient sign POA to her this week with  - he may still have adequate MDM capacity for this in spite of some deficits - would recommend neuropsych c/s to confirm > pending  · Started on Aricept due to memory difficulties after stroke in 2019  · Currently on 5 mg twice daily  · Sees Dr Len Causey in outpatient neurology    Anxiety and depression  Assessment & Plan  Mood down at times still - provided additional supportive counseling   Continues to sleep better, tolerating med regimen  Anxiety, depression, significant insomnia with difficulty adjusting   Neuropsych c/s 5/22  Adjustment Disorder with Anxiety and Depression  R/O Post Stroke Depression  • Supportive psychotherapy, utilizing CBT and mindfulness strategies  • DBT distress tolerance techniques  to improve coping and mood  • Meditation and relaxation training  Continue chronic home Celexa 20mg qday   Sleep impaired on lower dose Trazodone > increase back to 50mg at bedtime  - Monitor for excessive serotonin - not appreciable thus far    -Monitor for signs and symptoms of excessive serotonin  -Monitor mood, efficacy, tolerance     Urinary retention  Assessment & Plan  - Urology c/s 5/19 for ongoing and recurrent retention; hx of urinary retention in prior hospital courses as well > jo re-inserted > continued with recent new CVA, sub-optimal mobility, possible UTI  - Consider removal late this week - 2 week wesley is 6/2  - Continue flomax to 0 8mg qday  - UA c/ reflex culture obtained 1/07 for AMS of uncertain etiology by on-call provider    - UA dirty with mod leukocytes, +nitrite, TMTC blood, WBC 10-20; UC pending    - Patient had increased leg spasms; pt afebrile; denies increased bladder/abdominal spasms; has ongoing mild leukocytosis of uncertain etiology   - Empirically treating with Keflex 500mg Q8H (discussed dosing with IM) started 5/30  - spasms improving, mentation > continue for now - follow-up UC  - Monitor for infection   - Optimal bowel mgmt, mobilize  - OP urology follow-up       Multiple thyroid nodules  Assessment & Plan  Incidental finding on CTA 6/1  SOFT TISSUES OF THE NECK: Several hypodense nodules are scattered within the thyroid gland  The largest of these is located on the right measuring 1 5 cm in craniocaudad dimension  This has gradually increased in size when compared with prior examination;  from 2019  Incidental discovery of one or more thyroid nodule(s) measuring more than 1 5 cm and without suspicious features is noted in this patient who is above 28years old; according to guidelines published in the February 2015 white paper on incidental thyroid nodules in the Journal of the [employer] of Radiology Irma Barfield), further characterization with thyroid ultrasound is recommended    - Discuss with IM if this needs to be done now or after d/c from [facility]      Leukocytosis  Assessment & Plan  · WBC 12 9>11 9>10 69 on 5/29   · Comgmt with IM   · Monitor for s/s of infection or other etiologies; monitor vitals/labs   · Starting Empiric Keflex for questionable UTI     Hypertriglyceridemia  Assessment & Plan  Continue statin    Primary hypertension  Assessment & Plan  · Adequate control  · Mgmt per IM:  metoprolol succinate 100 mg daily as well as Cozaar 50 mg daily   · Hctz stopped    GERD (gastroesophageal reflux "disease)  Assessment & Plan  Continue pantoprazole and as needed Tums    At risk for venous thromboembolism (VTE)  Assessment & Plan  SCDs, ambulation, and heparin       Abnormal laboratory test  Assessment & Plan  AT3 89% on 4/2023 lab per prior providers; can be low from recent stroke and not true AT3 def  - Recommend follow-up with neurology and repeat thrombosis panel as an outpatient     Hypokalemia  Assessment & Plan  · Remains improved    Chronic ischemic left MCA stroke  Assessment & Plan  · Recent left MCA stroke in the beginning of April at 4/16/2023 with aphasia and was empirically treated with Eliquis 5 mg twice daily as well as aspirin 81 mg due to embolic stroke of undetermined source per prior documentation  · Despite this he presented with additional strokes for this admission > see mgmt above     History of tobacco use  Assessment & Plan  · Patient with a history of tobacco use yet quit in 2019  · Does not need nicotine patches    Prediabetes  Assessment & Plan  · Last hemoglobin A1c of 5 8  · Mgmt per IM during [facility]   · Started on consistent carbohydrate 3 diet here in addition to cardiac diet on admission to [facility]    Chronic low back pain  Assessment & Plan  · Adequate control  · APAP TID PRN - discussed with pt   · Robaxin 500mg Q6H PRN spasms   · Oxy 2 5-5mg Q6H PRN   · Continue lidocaine patch and aqua K-pad but not in the same area at the same time    Chronic ischemic right MCA stroke  Assessment & Plan  · Patient with history of right MCA stroke back in March 2019 status post thrombectomy  · Had loop recorder placement without overt arrhythmia and was on Plavix 75 mg at that time  Also with known severe right M1 stenosis  · See \"Stroke\" mgmt above         Other Medical Issues:  • Monitor for     Follow-up providers and other issues to be followed up after discharge  PCP  Neuro  Cards  Vasc Sx     CODE: Level 1: Full Code    Restrictions include: Fall precautions    Objective:     Allergies per " "EMR  Diagnostic Studies: Reviewed   CTA head and neck w wo contrast   Final Result by Reynaldo Issa DO (05/31 1641)      CT brain: Subacute ischemia within the left hemisphere without mass effect or taiwo hemorrhagic transformation  Advanced chronic microangiopathic change within the brain parenchyma  CT angiogram: Stable atherosclerotic disease of the right carotid bifurcation including the ICA origin and midportion of the bulb, approximately 60 to 70% at the point of maximal stenosis  A carotid stent has been placed on the left with only mild residual narrowing within the proximal internal carotid artery  Stable severe stenosis of the distal right M1 segment with decreased size and number of M2 branches  Workstation performed: [id number]         MRI brain wo contrast   Final Result by Adelia Alvarez MD (05/30 6091)      Similar areas of left parietal occipital recent ischemia/infarct  Some new areas of acute to subacute left parietal/occipital acute to subacute ischemia  Findings suggesting normal pressure hydrocephalus  Findings were marked as \"immediate\"in Epic and will now be related to the ordering physician or covering clinical team by the radiology liaison  Workstation performed: [id number]         CT head wo contrast   Final Result by Garcia Hall MD (05/30 5798)      Slight interval progression and extension of the nonhemorrhagic acute to early subacute left cerebral infarction noted on the recent MRI study  Mild regional sulcal crowding but no significant mass effect on the underlying ventricular system or evidence    of herniation  Redemonstrated disproportionate ventriculomegaly to sulcal prominence which can be seen in patients with normal pressure hydrocephalus  Background cerebral white matter changes consistent with moderate chronic microangiopathic disease  The study was marked in [facility] for immediate notification            " Workstation performed: [id number]             See above as well    Laboratory: Labs reviewed  Results from last 7 days   Lab Units 05/28/23  0507   HEMATOCRIT % 36 5   HEMOGLOBIN g/dL 12 0   WBC Thousand/uL 10 69*     Results from last 7 days   Lab Units 05/28/23  0507   BUN mg/dL 19   CHLORIDE mmol/L 109*   CREATININE mg/dL 1 04   POTASSIUM mmol/L 3 6            Drug regimen reviewed, all potential adverse effects identified and addressed:    Current Facility-Administered Medications   Medication Dose Route Frequency Provider Last Rate   • acetaminophen  975 mg Oral TID PRN Aman Verdugo MD     • aspirin  81 mg Oral Daily Martinez Rabago, DO     • atorvastatin  40 mg Oral Daily Martinez Rabago, DO     • bisacodyl  10 mg Rectal Daily PRN Martinez Rabago, DO     • calcium carbonate  1,000 mg Oral Daily PRN Martinez Rabago, DO     • cephalexin  500 mg Oral [facility] Aman Verdugo MD     • citalopram  20 mg Oral Daily Martinez Rabago, DO     • donepezil  5 mg Oral BID Martinez Rabago, DO     • heparin (porcine)  5,000 Units Subcutaneous [facility] Martinez Rabago, DO     • lidocaine  1 patch Topical Daily Martinez Rabago, DO     • lidocaine   Topical Q4H PRN Aman Verdugo MD     • losartan  50 mg Oral Daily Aman Verdugo MD     • menthol-methyl salicylate   Apply externally 4x Daily PRN Katarina Hurtado MD     • methocarbamol  500 mg Oral Q6H PRN Martinez Rabago, DO     • metoprolol succinate  100 mg Oral Daily Aman Verdugo MD     • ondansetron  4 mg Oral Q6H PRN Martinez Rabago, DO     • oxyCODONE  5 mg Oral Q6H PRN Martinez Rabago, DO     • oxyCODONE  2 5 mg Oral Q6H PRN Martinez Rabago, DO     • pantoprazole  20 mg Oral Early Morning Martinez Rabago, DO     • polyethylene glycol  17 g Oral QAM Martinez Rabago, DO     • senna  2 tablet Oral Daily PRN Martinez Rabago, DO     • senna-docusate sodium  2 tablet Oral BID Martinez Rabago, DO     • tamsulosin  0 8 mg Oral Daily With ERIC Pollard     • "ticagrelor  90 mg Oral Q12H [address] María Mak DO     • traZODone  50 mg Oral HS Ignacio Joseph MD         •  acetaminophen  •  bisacodyl  •  calcium carbonate  •  lidocaine  •  menthol-methyl salicylate  •  methocarbamol  •  ondansetron  •  oxyCODONE  •  oxyCODONE  •  senna          Chief Complaints:  Strokes    Subjective: On eval, patient reports concern that he continues to have strokes  He denies worsening strength,, lightheadedness, pain, fever, increased spasms, abdominal pain, or other new complaints  ROS: A 10 point ROS was performed; negative except as noted above  Physical Exam:  05/31/23 1352 98 5 °F (36 9 °C) 78 18 138/68 -- 97 % None (Room air)     Vitals above reviewed on date of encounter    GEN:  Lying in bed in NAD   HEENT/NECK: MMM  CARDIAC: Regular rate rhythm, no murmers, no rubs, no gallops  LUNGS:  clear to auscultation, no wheezes, rales, or rhonchi  ABDOMEN: Soft, non-tender, non-distended, normal active bowel sounds  EXTREMITIES/SKIN:  no calf edema, no calf tenderness to palpation  NEURO:   MENTAL STATUS: Stable aphasia, oriented to self, place, and largely to situation, CN II-XII: Stable and Strength/MMT:  Near full with perhaps trace R sided weakness - stable  PSYCH:  Affect:  Somewhat tearful at times    HPI:  Per admitting provider   \"Constanza Meeks is a 58 y o  male with history of prediabetes, hypertension, depression, urinary retention on Flomax, carotid stenosis, depression, prior left MCA and right MCA stroke with aphasia and memory deficits now on Aricept who presented to the [facility] on 4/26 for AMS witnessed by his wife with abnormal speech that was noted to be similar to his prior strokes  Of note he was recently in the hospital earlier in the month for a left MCA stroke  He also had a right MCA stroke status post thrombectomy back in 2019  He is a prior smoker and quit at the time of his initial stroke    Additionally he " "had hyponatremia which was felt to be due to HCTZ use and potentially poor oral intake, chronic low back pain  He was seen by neurology as well as neurosurgery and eventually switched from his Eliquis/aspirin to aspirin and Brilinta  It was felt not to be cardioembolic by neurosurgery  He was also not given TNK given his recent Eliquis use when he arrived  He had imaging prior on his last admission however now with new focus of diffusion abnormality in the right frontal parietal region and in the left periatrial and parieto-occipital region  No acute hemorrhage was seen  Had a carotid Doppler showing LICA 54-84%/YBRI <81%   IVY was done and did not show any PFO or thrombus  Given that patient was on Plavix when he had his stroke on 04/16/2023, he was placed on Sharlyne Petite was a question of vasculitis as the etiology but Neurosurgery saw him in consult and felt that it was likely related to atherosclerotic and carotid disease and they stopped the Eliquis and placed back on ASA   He had a cerebral arteriogram on 5/4 23 with a left ICA PTA/stent placement   There was no vasculitis noted    Per Neurology full anticoagulation did not need to be restarted  The patient was evaluated by the Rehabilitation team and deemed an appropriate candidate for comprehensive inpatient rehabilitation and admitted to the [facility] on 5/12/2023  2:35 PM\"    ** Please Note: Fluency Direct voice to text software may have been used in the creation of this document  **    60 minutes or greater spent for this encounter which included a combination of face-to-face time with the patient and non-face-to-face time which in part specifically includes management of CVA  Face-to-face time included extended discussion with patient regarding current condition, medical history, mood, medical/rehabilitation management, and disposition    Non face-to-face time included coordination of care with patient's co-managing AP and/or physician(s) thru " communication and review of their recent documentation as well as reviewing vitals, bowel/bladder function, recent labs, and notes from therapy, CM, and nursing  In addition, this patient was discussed by the interdisciplinary team in weekly case conference today  The care of the patient was extensively discussed with all care providers and an appropriate rehabilitation plan was formulated unique for this patient  Barriers were identified preventing progression of therapy and appropriate interventions were discussed with each discipline  Please see the team note for input from all disciplines regarding barriers, intervention, and discharge planning    Updated wife during day on events/and final plan pending per neuro  I personally performed the required components and examined the patient myself in person on 5/31/23

## 2023-05-31 NOTE — PROGRESS NOTES
"   05/31/23 0700   Pain Assessment   Pain Assessment Tool 0-10   Pain Score No Pain   Restrictions/Precautions   Precautions Aphasia; Aspiration;Bed/chair alarms;Cognitive; Fall Risk; [place];Pain;Supervision on toilet/commode;Visual deficit   Weight Bearing Restrictions No   ROM Restrictions No   Lifestyle   Autonomy \"come on Zhu\"   Oral Hygiene   Type of Assistance Needed Set-up / Michael Hair; Verbal cues   Physical Assistance Level No physical assistance   Comment pt seated in w/c at sink demo's significant difficulty applying tooth paste to tooth brush requiring total set up of above  once set up, pt able to complete oral care with supervision  visual impairments significantly hinder pt IND with oral care despite max VC/TC  Oral Hygiene CARE Score 4   Shower/Bathe Self   Type of Assistance Needed Physical assistance   Physical Assistance Level 51%-75%   Comment seated on shower bench, pt able to bathe UB with set up and inc time for thoroughness of LUE as well as max VCs due to visual impairment, upper legs, lower legs, boaz  with assist to bathe feet and rear in stance with heavy reliance on   Shower/Bathe Self CARE Score 2   Upper Body Dressing   Type of Assistance Needed Physical assistance;Verbal cues   Physical Assistance Level 26%-50%   Comment due to visual impairments and impaired func cog for direction following, pt requires assist to locate and thread RUE into shirt, pt able to thread LUE, head and pull down over trunk   slight adjustment over trunk required   Upper Body Dressing CARE Score 3   Lower Body Dressing   Type of Assistance Needed Physical assistance   Physical Assistance Level 76% or more   Comment pt able to thread LLE, requires assist to thread RLE, assist to complete CM in stance at  with pt standing with CG   Lower Body Dressing CARE Score 2   Putting On/Taking Off Footwear   Type of Assistance Needed Physical assistance   Physical Assistance Level 76% or more   Comment pt able to " "doff L sock; requires assist to doff R sock, don socks and sneakers  Putting On/Taking Off Footwear CARE Score 2   Sit to Stand   Type of Assistance Needed Physical assistance;Verbal cues   Physical Assistance Level 26%-50%   Comment from various surfaces, pt requires anywhere from CG to min A with max VCs for R UE/R LE placement   Sit to Stand CARE Score 3   Bed-Chair Transfer   Type of Assistance Needed Physical assistance   Physical Assistance Level 51%-75%   Comment noted inc assist this date due to increased fatigue and decreased ability to safely follow directions/commands  Max VC/TC for hand and foot placement  Chair/Bed-to-Chair Transfer CARE Score 2   Cognition   Overall Cognitive Status Impaired   Arousal/Participation Alert; Cooperative   Attention Attends with cues to redirect   Orientation Level Oriented to person;Oriented to place;Oriented to time;Oriented to situation   Memory Decreased recall of precautions;Decreased recall of recent events;Decreased short term memory   Following Commands Follows one step commands inconsistently   Activity Tolerance   Activity Tolerance Patient limited by fatigue   Assessment   Treatment Assessment pt engages in 90 minute skilled OT Session focusing on shower routine, func transfers and standing daxa/bal for self care tasks  see above for full func details  pt reports being \"tired\" this AM due to MRI last night--repeatedly stating, \"hopeful for sleep\" but still agreeable to participate in shower routine  pt noted with initial transfer OOB with inc assist up to mod A due to poor safety/insight and decreased ability to appropriately follow simple directions/commands also requiring max VC/TC for hand/foot placement  due to this, used Ax2 for swap out tech in shower to safely sit on transfer bench   during shower, pt noted to use RUE functionally w/o prompting or cueing, however noted difficuly as pt tended to drop/lose control of wash cloth and had no awareness of " "above  with progression of session, pt noted to improve on direction following and commands  pt noted with impairments in LUE as well, especially when performing func reaching with pt noted to abduct shoulder and attempt to perform forearm supination with pt reporting \"no\" to pain/discomfort in shoulder however ROM appeared slightly impaired--?? impaired func cog  will continue to monitor  recommend continued skilled care to focus on ADL retraining, func transfers, standing daxa/bal, RUE NMR, func cog/func vision, safety/insight, in order to decrease burden of care at d/c  Prognosis Fair   Problem List Decreased strength;Decreased range of motion;Decreased endurance; Impaired balance;Decreased mobility; Decreased coordination;Decreased cognition; Impaired judgement;Decreased safety awareness; Impaired vision; Impaired sensation;Pain   Barriers to Discharge Inaccessible home environment;Decreased caregiver support   Plan   Treatment/Interventions ADL retraining;Functional transfer training; Therapeutic exercise; Endurance training;Cognitive reorientation;Patient/family training;Equipment eval/education; Compensatory technique education   OT Therapy Minutes   OT Time In 0700   OT Time Out 0830   OT Total Time (minutes) 90   OT Mode of treatment - Individual (minutes) 90   OT Mode of treatment - Concurrent (minutes) 0   OT Mode of treatment - Group (minutes) 0   OT Mode of treatment - Co-treat (minutes) 0   OT Mode of Treatment - Total time(minutes) 90 minutes   OT Cumulative Minutes 1345   Therapy Time missed   Time missed?  No       "

## 2023-06-01 ENCOUNTER — APPOINTMENT (OUTPATIENT)
Dept: RADIOLOGY | Facility: HOSPITAL | Age: 63
DRG: 057 | End: 2023-06-01
Payer: COMMERCIAL

## 2023-06-01 PROBLEM — E04.2 MULTIPLE THYROID NODULES: Status: ACTIVE | Noted: 2023-06-01

## 2023-06-01 LAB
ANION GAP SERPL CALCULATED.3IONS-SCNC: 3 MMOL/L (ref 4–13)
BACTERIA UR CULT: ABNORMAL
BACTERIA UR CULT: ABNORMAL
BASOPHILS # BLD AUTO: 0.09 THOUSANDS/ÂΜL (ref 0–0.1)
BASOPHILS NFR BLD AUTO: 1 % (ref 0–1)
BUN SERPL-MCNC: 15 MG/DL (ref 5–25)
CALCIUM SERPL-MCNC: 9.1 MG/DL (ref 8.3–10.1)
CHLORIDE SERPL-SCNC: 111 MMOL/L (ref 96–108)
CO2 SERPL-SCNC: 24 MMOL/L (ref 21–32)
CREAT SERPL-MCNC: 0.93 MG/DL (ref 0.6–1.3)
EOSINOPHIL # BLD AUTO: 0.8 THOUSAND/ÂΜL (ref 0–0.61)
EOSINOPHIL NFR BLD AUTO: 10 % (ref 0–6)
ERYTHROCYTE [DISTWIDTH] IN BLOOD BY AUTOMATED COUNT: 14.6 % (ref 11.6–15.1)
GFR SERPL CREATININE-BSD FRML MDRD: 86 ML/MIN/1.73SQ M
GLUCOSE P FAST SERPL-MCNC: 92 MG/DL (ref 65–99)
GLUCOSE SERPL-MCNC: 92 MG/DL (ref 65–140)
HCT VFR BLD AUTO: 36 % (ref 36.5–49.3)
HGB BLD-MCNC: 12.4 G/DL (ref 12–17)
IMM GRANULOCYTES # BLD AUTO: 0.04 THOUSAND/UL (ref 0–0.2)
IMM GRANULOCYTES NFR BLD AUTO: 1 % (ref 0–2)
LYMPHOCYTES # BLD AUTO: 1.79 THOUSANDS/ÂΜL (ref 0.6–4.47)
LYMPHOCYTES NFR BLD AUTO: 22 % (ref 14–44)
MCH RBC QN AUTO: 33.3 PG (ref 26.8–34.3)
MCHC RBC AUTO-ENTMCNC: 34.4 G/DL (ref 31.4–37.4)
MCV RBC AUTO: 97 FL (ref 82–98)
MONOCYTES # BLD AUTO: 0.92 THOUSAND/ÂΜL (ref 0.17–1.22)
MONOCYTES NFR BLD AUTO: 11 % (ref 4–12)
NEUTROPHILS # BLD AUTO: 4.68 THOUSANDS/ÂΜL (ref 1.85–7.62)
NEUTS SEG NFR BLD AUTO: 55 % (ref 43–75)
NRBC BLD AUTO-RTO: 0 /100 WBCS
PLATELET # BLD AUTO: 252 THOUSANDS/UL (ref 149–390)
PMV BLD AUTO: 10 FL (ref 8.9–12.7)
POTASSIUM SERPL-SCNC: 3.6 MMOL/L (ref 3.5–5.3)
RBC # BLD AUTO: 3.72 MILLION/UL (ref 3.88–5.62)
SODIUM SERPL-SCNC: 138 MMOL/L (ref 135–147)
WBC # BLD AUTO: 8.32 THOUSAND/UL (ref 4.31–10.16)

## 2023-06-01 PROCEDURE — 85025 COMPLETE CBC W/AUTO DIFF WBC: CPT | Performed by: NURSE PRACTITIONER

## 2023-06-01 PROCEDURE — 97535 SELF CARE MNGMENT TRAINING: CPT

## 2023-06-01 PROCEDURE — 97112 NEUROMUSCULAR REEDUCATION: CPT

## 2023-06-01 PROCEDURE — 97530 THERAPEUTIC ACTIVITIES: CPT

## 2023-06-01 PROCEDURE — 80048 BASIC METABOLIC PNL TOTAL CA: CPT | Performed by: NURSE PRACTITIONER

## 2023-06-01 PROCEDURE — 76536 US EXAM OF HEAD AND NECK: CPT

## 2023-06-01 PROCEDURE — 99232 SBSQ HOSP IP/OBS MODERATE 35: CPT

## 2023-06-01 PROCEDURE — 92507 TX SP LANG VOICE COMM INDIV: CPT

## 2023-06-01 PROCEDURE — 99232 SBSQ HOSP IP/OBS MODERATE 35: CPT | Performed by: INTERNAL MEDICINE

## 2023-06-01 PROCEDURE — 99232 SBSQ HOSP IP/OBS MODERATE 35: CPT | Performed by: PSYCHIATRY & NEUROLOGY

## 2023-06-01 RX ORDER — CILOSTAZOL 100 MG/1
100 TABLET ORAL
Status: DISCONTINUED | OUTPATIENT
Start: 2023-06-01 | End: 2023-06-06 | Stop reason: HOSPADM

## 2023-06-01 RX ORDER — LEVOFLOXACIN 750 MG/1
750 TABLET ORAL EVERY 24 HOURS
Status: COMPLETED | OUTPATIENT
Start: 2023-06-01 | End: 2023-06-05

## 2023-06-01 RX ORDER — BACLOFEN 10 MG/1
5 TABLET ORAL 2 TIMES DAILY PRN
Status: DISCONTINUED | OUTPATIENT
Start: 2023-06-01 | End: 2023-06-06 | Stop reason: HOSPADM

## 2023-06-01 RX ORDER — BACLOFEN 10 MG/1
5 TABLET ORAL ONCE
Status: DISCONTINUED | OUTPATIENT
Start: 2023-06-01 | End: 2023-06-01

## 2023-06-01 RX ADMIN — METOPROLOL SUCCINATE 100 MG: 100 TABLET, EXTENDED RELEASE ORAL at 08:34

## 2023-06-01 RX ADMIN — HEPARIN SODIUM 5000 UNITS: 5000 INJECTION INTRAVENOUS; SUBCUTANEOUS at 22:31

## 2023-06-01 RX ADMIN — LOSARTAN POTASSIUM 50 MG: 50 TABLET, FILM COATED ORAL at 08:34

## 2023-06-01 RX ADMIN — DONEPEZIL HYDROCHLORIDE 5 MG: 5 TABLET ORAL at 08:35

## 2023-06-01 RX ADMIN — CILOSTAZOL 100 MG: 100 TABLET ORAL at 18:03

## 2023-06-01 RX ADMIN — METHOCARBAMOL 500 MG: 500 TABLET ORAL at 09:47

## 2023-06-01 RX ADMIN — TICAGRELOR 90 MG: 90 TABLET ORAL at 22:32

## 2023-06-01 RX ADMIN — ASPIRIN 81 MG: 81 TABLET, COATED ORAL at 08:34

## 2023-06-01 RX ADMIN — CEPHALEXIN 500 MG: 500 CAPSULE ORAL at 06:01

## 2023-06-01 RX ADMIN — TAMSULOSIN HYDROCHLORIDE 0.8 MG: 0.4 CAPSULE ORAL at 17:24

## 2023-06-01 RX ADMIN — DONEPEZIL HYDROCHLORIDE 5 MG: 5 TABLET ORAL at 17:24

## 2023-06-01 RX ADMIN — HEPARIN SODIUM 5000 UNITS: 5000 INJECTION INTRAVENOUS; SUBCUTANEOUS at 06:01

## 2023-06-01 RX ADMIN — POLYETHYLENE GLYCOL 3350 17 G: 17 POWDER, FOR SOLUTION ORAL at 08:34

## 2023-06-01 RX ADMIN — CITALOPRAM HYDROBROMIDE 20 MG: 20 TABLET ORAL at 08:35

## 2023-06-01 RX ADMIN — LEVOFLOXACIN 750 MG: 750 TABLET, FILM COATED ORAL at 13:37

## 2023-06-01 RX ADMIN — PANTOPRAZOLE SODIUM 20 MG: 20 TABLET, DELAYED RELEASE ORAL at 06:01

## 2023-06-01 RX ADMIN — ATORVASTATIN CALCIUM 40 MG: 40 TABLET, FILM COATED ORAL at 08:34

## 2023-06-01 RX ADMIN — SENNOSIDES, DOCUSATE SODIUM 2 TABLET: 8.6; 5 TABLET ORAL at 08:34

## 2023-06-01 RX ADMIN — TRAZODONE HYDROCHLORIDE 50 MG: 50 TABLET ORAL at 22:32

## 2023-06-01 RX ADMIN — TICAGRELOR 90 MG: 90 TABLET ORAL at 08:35

## 2023-06-01 RX ADMIN — HEPARIN SODIUM 5000 UNITS: 5000 INJECTION INTRAVENOUS; SUBCUTANEOUS at 13:37

## 2023-06-01 NOTE — PROGRESS NOTES
"   06/01/23 0690   Pain Assessment   Pain Assessment Tool 0-10   Pain Score No Pain   Comprehension   Comprehension (FIM) 3 - Understands basic info/conversation 50-74% of time   Expression   Expression (FIM) 3 - Expresses basic info/needs 50-74% of time   Social Interaction   Social Interaction (FIM) 4 - Interacts 75-89% of time   Problem Solving   Problem solving (FIM) 3 - Solves basic problmes 50-74% of time   Memory   Memory (FIM) 3 - Recognizes, recalls/performs 50-74%   Speech/Language/Cognition Assessmetn   Treatment Assessment Pt sitting in wheelchair at desk in room upon SLP entering, agreeable to skilled ST session  Pt with reports of significant \"annoyance\" from his muscles spasms in back, but no reports of pain  SLP notified GARRETT Bernard, who provided medicine to alleviate the reported \"annoyance  \" Initially, pt engaging in constrain induced language therapy (CILT) during conversation to elicit spontaneous speech and assess pt's use of self cueing  Discussed topics related to beach and camping trips he's taken in the past, kind of lodging utilized, what side of the street ([place] or beach side), location ([place], [place], [place]), owning different houses/sites, and why no longer owning or going  Pt providing responses characteristically with hesitations, paraphasias, perseveration, and mild groping  Once frustration increased during CILT, SLP began to provide additional support in the form of visual cues (using apple maps), verbal prompts (multiple choice options, clarification questions, phrase completion, semantic cues)  Overall, required mod-maxA to maintain communicative exchange and therapist understanding  Next, pt asked to respond to SLP questions by reading aloud his script that contains bio and situational info  Able to i'ly read his F & L name, month of birth, town of residence, current location, why he is here, children's names, grandchildren's names, dog's name, and wife's name   In unison, able to read aloud his " "age and street address  Unable to read aloud his day of birth ([birth year]), year of birth (paraphasia), or his zip code (commission of \"7,\" though remaining units were correct)  Pt demonstrates greater ability to read aloud such information than spontaneous oral production  However, this is not the case for pt's address, where in prior sessions performed better w/out visual cue/prompt  Given pt's significant impairment with spontaneous oral production and oral reading of numerical units, it is recommended and current SLP plans to incorporate more stimuli to improve this  Finally, pt given opportunity to use his reading comprehension and visual scanning skills to choose the correct word in a F03 to complete a sentence to ensure it makes sense  Pt performed with 0/1 opps  SLP utilized anchor line to assist with visual scanning all the way to R side of page to ensure attention to all three options  Pt benefits from SLP reading the phrase, then all three options, and pt stating \"yes\" or \"no\" if the word accurately completes the phrase  At end of session, pt requesting to lay in bed to rest his back and legs and try and reduce the spasms  RN and SLP assisted pt back to his bed; he demonstrated good auditory comprehension for verbal directions for safe transfer  To conclude, although pt initially showed continued impairment in spontaneous speech using CILT, he does show improvements comparably to prior sessions and thus demonstrating benefit form skilled ST  In addition to CILT, he continues to benefit from auditory-verbal cues, phrase completion, visual prompts, and phonemic cues to maximize his expressive-receptive language skills necessary for successful communicative exchanges  It is recommended he continue to receive skilled ST to further optimize these skills with aforementioned strategies while at the [facility] to reduce caregiver burden upon D/C      SLP Therapy Minutes   SLP Time In 0930   SLP Time Out 1030   SLP Total Time " (minutes) 60   SLP Mode of treatment - Individual (minutes) 60   SLP Mode of treatment - Concurrent (minutes) 0   SLP Mode of treatment - Group (minutes) 0   SLP Mode of treatment - Co-treat (minutes) 0   SLP Mode of Treatment - Total time(minutes) 60 minutes   SLP Cumulative Minutes 8640   Therapy Time missed   Time missed?  No

## 2023-06-01 NOTE — PROGRESS NOTES
06/01/23 1400   Pain Assessment   Pain Assessment Tool 0-10   Pain Score No Pain   Restrictions/Precautions   Precautions Aphasia; Aspiration;Bed/chair alarms;Cognitive; Fall Risk; [place]   Subjective   Subjective pt agreeable to perform skilled PT   Sit to Stand   Type of Assistance Needed Physical assistance   Physical Assistance Level 25% or less   Sit to Stand CARE Score 3   Bed-Chair Transfer   Type of Assistance Needed Physical assistance   Physical Assistance Level 26%-50%   Reason if not Attempted Safety concerns   Chair/Bed-to-Chair Transfer CARE Score -   Walk 10 Feet   Type of Assistance Needed Physical assistance   Physical Assistance Level Total assistance   Comment HHA x2 WC follow limited walking distanaec d/t lateral lean to his right side   Walk 10 Feet CARE Score 1   Walk 50 Feet with Two Turns   Type of Assistance Needed Physical assistance   Physical Assistance Level Total assistance   Comment HHAx2 WC follow   Walk 50 Feet with Two Turns CARE Score 1   Walk 150 Feet   Reason if not Attempted Safety concerns   Walk 150 Feet CARE Score 88   Walking 10 Feet on Uneven Surfaces   Reason if not Attempted Safety concerns   Walking 10 Feet on Uneven Surfaces CARE Score 88   Ambulation   Primary Mode of Locomotion Prior to Admission Walk   Distance Walked (feet) 100 ft   Does the patient walk? 2  Yes   Wheel 50 Feet with Two Turns   Reason if not Attempted Safety concerns   Wheel 50 Feet with Two Turns CARE Score 88   Wheel 150 Feet   Reason if not Attempted Safety concerns   Wheel 150 Feet CARE Score 88   Wheelchair mobility   Type of Wheelchair Used 1   Manual   Curb or Single Stair   Reason if not Attempted Safety concerns   1 Step (Curb) CARE Score 88   4 Steps   Reason if not Attempted Safety concerns   4 Steps CARE Score 88   12 Steps   Reason if not Attempted Safety concerns   12 Steps CARE Score 88   Picking Up Object   Reason if not Attempted Safety concerns   Picking Up Object CARE Score 88 Therapeutic Interventions   Neuromuscular Re-Education STS weight shifting and HHA walking W follow   Equipment Use   NuStep lvl 3 for 19 min   Assessment   Treatment Assessment pt agreeable to perform skilled PT working on STS S/PT and overall HHA x2 WC follow and inc reps and less rest time   Pt HHA x2 WC follow but leaning more right side RLE cross over medline during walking   Pt also will cont toward DC goals see CM note   Pt in [facility] with all needs in reach and alarm on   Barriers to Discharge Decreased caregiver support; Inaccessible home environment   Plan   Progress Progressing toward goals   PT Therapy Minutes   PT Time In 1400   PT Time Out 1500   PT Total Time (minutes) 60   PT Mode of treatment - Individual (minutes) 30   PT Mode of treatment - Concurrent (minutes) 30   PT Mode of treatment - Group (minutes) 0   PT Mode of treatment - Co-treat (minutes) 0   PT Mode of Treatment - Total time(minutes) 60 minutes   PT Cumulative Minutes 1248   Therapy Time missed   Time missed?  No

## 2023-06-01 NOTE — PROGRESS NOTES
"Internal Medicine Progress Note  Patient: Kary Kruger  Age/sex: 58 y o  male  Medical Record #: [id number]      ASSESSMENT/PLAN: (Interval History)  Kary Kruger is seen and examined and management for following issues:    Acute multifocal ischemic strokes  • Occurred in different arterial distributions  • IVY was negative for thrombus/PFO  • Felt not to be vasculitis and negative by a-gram 5/4/23  • Continue ASA/Brilinta/statin  • [facility] 5/29 = was abnormal with possible new infarcts after he felt his speech had declined  (Dr Nirav Arellano had seen him 5/28 for this complaint and had normal exam and felt speech was at baseline)  • MRI brain 5/30 = some new areas of acute to subacute left parietal/occipital infarcts; +NPH noted  • CTH/CTA 5/31/23 = \"Subacute ischemia within the left hemisphere without mass effect or taiwo hemorrhagic transformation  Advanced chronic microangiopathic change within the brain parenchyma  Stable atherosclerotic disease of the right carotid bifurcation including the ICA origin and midportion of the bulb, approximately 60 to 70% at the point of maximal stenosis  A carotid stent has been placed on the left with only mild residual narrowing within the proximal internal carotid artery  Stable severe stenosis of the distal right M1 segment with decreased size and number of M2 branches\"    • Await Neurology input based on the CTA results  • Will need to followup in [facility]PH clinic as OP  • S/p IVF 2/2 having CTA      Left ICA stenosis  • Was noted to have all of the CVAs in last month have been in left anterior circulation  • S/p PTA/stent 5/4/23  • Plan was to recheck carotid doppler 6 weeks and see NS but 2/2 inc expresive aphasia the above workup was done  • Continue AP/statin     Right ICA stenosis  • To followup with Vasc surgery as OP  • Continue AP/statin     LOOP recorder  • Was placed 3/2019  • Neuro wants it to be replaced = for OP to Cardiology     Leukocytosis  • Had no fevers  • CXR and " Procal were normal in the hospital  • resolved     HTN  • Home:  Toprol 100mg qd/Norvasc 10mg qd/Losartan 50mg qd/HCTZ 25mg qd/Hydralazine 25mg TID  • Here:  Toprol 100mg qd/Losartan 50mg qd  • stable     Pre DM  • HbA1C 5 8  • Takes Metformin at home but currently not on it in hospital  • Accuchecks were stable and dc'd in the hospital  • Monitor FBS with BMPs and continue DM diet  • FBS 92 on 6/1/23     Depression  • Continue Celexa as at home     Memory loss  • Continue Aricept as at home  • He has had memory issues since his CVA in 2019     Urine retention  • Has jo placed 5/8/23  • VT 5/18 failed and jo replaced by Urology 5/19/23 and is not to be removed  • Continue Flomax     Chronic LBP  • MRI showed advanced multilevel spondylosis, slightly progressed on the right at L2-3 compared to the prior study but otherwise stable  • Pain management per PMR     AAA  • Found on L-spine MRI  • Measures 3 4 cm  • OP followup     Hyponatremia  • Stopped HCTZ 5/16/23  • Resolved off of HCTZ and had been given IVF     Leg cramps  • Per PMR     UTI  • cx = >100,000 Enterococcus faecalis and 20,000-29,000 mixed contaminants x 2  • Has had no fever   • Dr Mello Rdz started Keflex 5/30 but now based on sensitivities will be switched to Levaquin 750mg qd x 5 = d/w Dr Ferreira Hemp    Thyroid nodules  · Found on CTA H/N  · For thyroid U/S        Discharge date:  pending subacute rehab       The above assessment and plan was reviewed and updated as determined by my evaluation of the patient on 6/1/2023      Labs:   Results from last 7 days   Lab Units 06/01/23  0547 05/28/23  0507   HEMATOCRIT % 36 0* 36 5   HEMOGLOBIN g/dL 12 4 12 0   PLATELETS Thousands/uL 252 277   WBC Thousand/uL 8 32 10 69*     Results from last 7 days   Lab Units 06/01/23  0547 05/28/23  0507   BUN mg/dL 15 19   CALCIUM mg/dL 9 1 9 1   CHLORIDE mmol/L 111* 109*   CO2 mmol/L 24 26   CREATININE mg/dL 0 93 1 04   POTASSIUM mmol/L 3 6 3 6   SODIUM mmol/L 138 138 Review of Scheduled Meds:  Current Facility-Administered Medications   Medication Dose Route Frequency Provider Last Rate   • acetaminophen  975 mg Oral TID PRN Jerome Rangel MD     • aspirin  81 mg Oral Daily Lossie Goodell, DO     • atorvastatin  40 mg Oral Daily Janelle Goodell, DO     • baclofen  5 mg Oral Once Jerome Rangel MD     • baclofen  5 mg Oral BID PRN Jerome Rangel MD     • bisacodyl  10 mg Rectal Daily PRN Lossie Goodell, DO     • calcium carbonate  1,000 mg Oral Daily PRN Janelle Luoell, DO     • citalopram  20 mg Oral Daily Janelle Goodell, DO     • donepezil  5 mg Oral BID Lossie Goodell, DO     • heparin (porcine)  5,000 Units Subcutaneous [facility] Lossie Goodell, DO     • levofloxacin  750 mg Oral Q24H Jerome Rangel MD     • lidocaine  1 patch Topical Daily Lossie Goodell, DO     • lidocaine   Topical Q4H PRN Jerome Rangel MD     • losartan  50 mg Oral Daily Jerome Rangel MD     • menthol-methyl salicylate   Apply externally 4x Daily PRN Lenard Heaton MD     • metoprolol succinate  100 mg Oral Daily Jerome Rangel MD     • ondansetron  4 mg Oral Q6H PRN Lossie Goodell, DO     • oxyCODONE  5 mg Oral Q6H PRN Lossie Goodell, DO     • oxyCODONE  2 5 mg Oral Q6H PRN Janelle Luoell, DO     • pantoprazole  20 mg Oral Early Morning Lossmarline Luoell, DO     • polyethylene glycol  17 g Oral QAM Lossie Goodell, DO     • senna  2 tablet Oral Daily PRN Lossie Goodell, DO     • senna-docusate sodium  2 tablet Oral BID Janelle Luoell, DO     • tamsulosin  0 8 mg Oral Daily With [address], CRNP     • ticagrelor  90 mg Oral Q12H [facility] Lossie Goodell, DO     • traZODone  50 mg Oral HS Jerome Rangel MD         Subjective/ HPI: Patient seen and examined  Patients overnight issues or events were reviewed with nursing or staff during rounds or morning huddle session  New or overnight issues include the following:     No new or overnight issues  "Offers no complaints    ROS:   A 10 point ROS was performed; negative except as noted above  Imaging:     CTA head and neck w wo contrast   Final Result by Reynaldo Saravia DO (05/31 2821)      CT brain: Subacute ischemia within the left hemisphere without mass effect or taiwo hemorrhagic transformation  Advanced chronic microangiopathic change within the brain parenchyma  CT angiogram: Stable atherosclerotic disease of the right carotid bifurcation including the ICA origin and midportion of the bulb, approximately 60 to 70% at the point of maximal stenosis  A carotid stent has been placed on the left with only mild residual narrowing within the proximal internal carotid artery  Stable severe stenosis of the distal right M1 segment with decreased size and number of M2 branches  Workstation performed: [id number]         MRI brain wo contrast   Final Result by William Pierre MD (05/30 7668)      Similar areas of left parietal occipital recent ischemia/infarct  Some new areas of acute to subacute left parietal/occipital acute to subacute ischemia  Findings suggesting normal pressure hydrocephalus  Findings were marked as \"immediate\"in Epic and will now be related to the ordering physician or covering clinical team by the radiology liaison  Workstation performed: [id number]         CT head wo contrast   Final Result by Jesse Raphael MD (05/30 6686)      Slight interval progression and extension of the nonhemorrhagic acute to early subacute left cerebral infarction noted on the recent MRI study  Mild regional sulcal crowding but no significant mass effect on the underlying ventricular system or evidence    of herniation  Redemonstrated disproportionate ventriculomegaly to sulcal prominence which can be seen in patients with normal pressure hydrocephalus  Background cerebral white matter changes consistent with moderate chronic microangiopathic disease      " The study was marked in [facility] for immediate notification  Workstation performed: [id number]         US thyroid    (Results Pending)       *Labs /Radiology studies reviewed  *Medications reviewed and reconciled as needed  *Please refer to order section for additional ordered labs studies  *Case discussed with primary attending during morning huddle case rounds    Physical Examination:  Vitals:   Vitals:    05/31/23 2100 06/01/23 0134 06/01/23 0530 06/01/23 0833   BP: 132/54 140/80 138/64 132/58   BP Location: Right arm   Right arm   Pulse: 62  69 68   Resp: 17  18 18   Temp: 98 1 °F (36 7 °C)  97 7 °F (36 5 °C)    TempSrc: Oral  Oral    SpO2: 96%  98%    Weight:           General Appearance: no distress, conversive  HEENT:  External ear normal   Nose normal w/o drainage  Mucous membranes are moist  Oropharynx is clear  Conjunctiva clear w/o icterus or redness  Neck:  Supple, normal ROM  Lungs: BBS without crackles/wheeze/rhonchi; respirations unlabored with normal inspiratory/expiratory effort  No retractions noted  On RA  CV: regular rate and rhythm; no rubs/murmurs/gallops, PMI normal   ABD: Abdomen is soft  Bowel sounds all quadrants  Nontender with no distention  EXT: no edema  Skin: normal turgor, normal texture, no rashes  Psych: affect normal, mood normal  Neuro: AAO; +expressive > receptive aphasia     The above physical exam was reviewed and updated as determined by my evaluation of the patient on 6/1/2023  Invasive Devices     Peripheral Intravenous Line  Duration           Peripheral IV 05/31/23 Left;Upper Arm <1 day          Drain  Duration           Urethral Catheter Coude 16 Fr  12 days                   VTE Pharmacologic Prophylaxis: Heparin  Code Status: Level 1 - Full Code  Current Length of Stay: 20 day(s)      Total time spent:  30 minutes with more than 50% spent counseling/coordinating care   Counseling includes discussion with patient re: progress  and discussion with patient of his/her current medical state/information  Coordination of patient's care was performed in conjunction with primary service  Time invested included review of patient's labs, vitals, and management of their comorbidities with continued monitoring  In addition, this patient was discussed with medical team including physician and advanced extenders  The care of the patient was extensively discussed and appropriate treatment plan was formulated unique for this patient  Medical decision making for the day was made by supervising physician unless otherwise noted in their attestation statement  ** Please Note:  voice to text software may have been used in the creation of this document   Although proof errors in transcription or interpretation are a potential of such software**

## 2023-06-01 NOTE — PLAN OF CARE
Problem: PAIN - ADULT  Goal: Verbalizes/displays adequate comfort level or baseline comfort level  Description: Interventions:  - Encourage patient to monitor pain and request assistance  - Assess pain using appropriate pain scale  - Administer analgesics based on type and severity of pain and evaluate response  - Implement non-pharmacological measures as appropriate and evaluate response  - Consider cultural and social influences on pain and pain management  - Notify physician/advanced practitioner if interventions unsuccessful or patient reports new pain  Outcome: Progressing     Problem: INFECTION - ADULT  Goal: Absence or prevention of progression during hospitalization  Description: INTERVENTIONS:  - Assess and monitor for signs and symptoms of infection  - Monitor lab/diagnostic results  - Monitor all insertion sites, i e  indwelling lines, tubes, and drains  - Monitor endotracheal if appropriate and nasal secretions for changes in amount and color  - [place] appropriate cooling/warming therapies per order  - Administer medications as ordered  - Instruct and encourage patient and family to use good hand hygiene technique  - Identify and instruct in appropriate isolation precautions for identified infection/condition  Outcome: Progressing  Goal: Absence of fever/infection during neutropenic period  Description: INTERVENTIONS:  - Monitor WBC    Outcome: Progressing     Problem: SAFETY ADULT  Goal: Patient will remain free of falls  Description: INTERVENTIONS:  - Educate patient/family on patient safety including physical limitations  - Instruct patient to call for assistance with activity   - Consult OT/PT to assist with strengthening/mobility   - Keep Call bell within reach  - Keep bed low and locked with side rails adjusted as appropriate  - Keep care items and personal belongings within reach  - Initiate and maintain comfort rounds  - Make Fall Risk Sign visible to staff  - Offer Toileting ours, in advance of need  - Initiate/Maint  - Obtain necessary fall risk management equip  - Apply yellow socks and bracelet for high fall risk patients  - Consider moving patient to room near nurses station  Outcome: Progressing  Goal: Maintain or return to baseline ADL function  Description: INTERVENTIONS:  -  Assess patient's ability to carry out ADLs; assess patient's baseline for ADL function and identify physical deficits which impact ability to perform ADLs (bathing, care of mouth/teeth, toileting, grooming, dressing, etc )  - Assess/evaluate cause of self-care deficits   - Assess range of motion  - Assess patient's mobility; develop plan if impaired  - Assess patient's need for assistive devices and provide as appropriate  - Encourage maximum independence but intervene and supervise when necessary  - Involve family in performance of ADLs  - Assess for home care needs following discharge   - Consider OT consult to assist with ADL evaluation and planning for discharge  - Provide patient education as appropriate  Outcome: Progressing  Goal: Maintains/Returns to pre admission functional level  Description: INTERVENTIONS:  - Perform BMAT or MOVE assessment daily    - Set and communicate daily mobility goal to care team and patient/family/caregiver  - Collaborate with rehabilitation services on mobility goals if consulted  - Perform Range mes a day  - Reposition patient eurs    - Dangle patiay  - Stand pat  - Ambulate  - Out of bed to  - Out of bed for  - Out of bed for toileting  - Record patient progress and toleration of activity level   Outcome: Progressing     Problem: DISCHARGE PLANNING  Goal: Discharge to home or other facility with appropriate resources  Description: INTERVENTIONS:  - Identify barriers to discharge w/patient and caregiver  - Arrange for needed discharge resources and transportation as appropriate  - Identify discharge learning needs (meds, wound care, etc )  - Arrange for interpretive services to assist at discharge as needed  - Refer to Case Management Department for coordinating discharge planning if the patient needs post-hospital services based on physician/advanced practitioner order or complex needs related to functional status, cognitive ability, or social support system  Outcome: Progressing     Problem: Prexisting or High Potential for Compromised Skin Integrity  Goal: Skin integrity is maintained or improved  Description: INTERVENTIONS:  - Identify patients at risk for skin breakdown  - Assess and monitor skin integrity  - Assess and monitor nutrition and hydration status  - Monitor labs   - Assess for incontinence   - Turn and reposition patient  - Assist with mobility/ambulation  - Relieve pressure over bony prominences  - Avoid friction and shearing  - Provide appropriate hygiene as needed including keeping skin clean and dry  - Evaluate need for skin moisturizer/barrier cream  - Collaborate with interdisciplinary team   - Patient/family teaching  - Consider wound care consult   Outcome: Progressing     Problem: MOBILITY - ADULT  Goal: Maintain or return to baseline ADL function  Description: INTERVENTIONS:  -  Assess patient's ability to carry out ADLs; assess patient's baseline for ADL function and identify physical deficits which impact ability to perform ADLs (bathing, care of mouth/teeth, toileting, grooming, dressing, etc )  - Assess/evaluate cause of self-care deficits   - Assess range of motion  - Assess patient's mobility; develop plan if impaired  - Assess patient's need for assistive devices and provide as appropriate  - Encourage maximum independence but intervene and supervise when necessary  - Involve family in performance of ADLs  - Assess for home care needs following discharge   - Consider OT consult to assist with ADL evaluation and planning for discharge  - Provide patient education as appropriate  Outcome: Progressing  Goal: Maintains/Returns to pre admission functional level  Description: INTERVENTIONS:  - Perform BMAT or MOVE assessment daily    - Set and communicate daily mobility goal to care team and patient/family/caregiver  - Collaborate with rehabilitation services on mobility goals if consulted  - Perform Range of Motmes a day  - Reposition patieours  - Dangle patiy  - Stand pat  - Ambulate pat  - Out of bed to coleen  - Out of bed for m  - Out of bed for toileting  - Record patient progress and toleration of activity level   Outcome: Progressing     Problem: Nutrition/Hydration-ADULT  Goal: Nutrient/Hydration intake appropriate for improving, restoring or maintaining nutritional needs  Description: Monitor and assess patient's nutrition/hydration status for malnutrition  Collaborate with interdisciplinary team and initiate plan and interventions as ordered  Monitor patient's weight and dietary intake as ordered or per policy  Utilize nutrition screening tool and intervene as necessary  Determine patient's food preferences and provide high-protein, high-caloric foods as appropriate       INTERVENTIONS:  - Monitor oral intake, urinary output, labs, and treatment plans  - Assess nutrition and hydration status and recommend course of action  - Evaluate amount of meals eaten  - Assist patient with eating if necessary   - Allow adequate time for meals  - Recommend/ encourage appropriate diets, oral nutritional supplements, and vitamin/mineral supplements  - Order, calculate, and assess calorie counts as needed  - Recommend, monitor, and adjust tube feedings and TPN/PPN based on assessed needs  - Assess need for intravenous fluids  - Provide specific nutrition/hydration education as appropriate  - Include patient/family/caregiver in decisions related to nutrition  Outcome: Progressing

## 2023-06-01 NOTE — PLAN OF CARE
Problem: PAIN - ADULT  Goal: Verbalizes/displays adequate comfort level or baseline comfort level  Description: Interventions:  - Encourage patient to monitor pain and request assistance  - Assess pain using appropriate pain scale  - Administer analgesics based on type and severity of pain and evaluate response  - Implement non-pharmacological measures as appropriate and evaluate response  - Consider cultural and social influences on pain and pain management  - Notify physician/advanced practitioner if interventions unsuccessful or patient reports new pain  Outcome: Progressing     Problem: INFECTION - ADULT  Goal: Absence or prevention of progression during hospitalization  Description: INTERVENTIONS:  - Assess and monitor for signs and symptoms of infection  - Monitor lab/diagnostic results  - Monitor all insertion sites, i e  indwelling lines, tubes, and drains  - Monitor endotracheal if appropriate and nasal secretions for changes in amount and color  - [place] appropriate cooling/warming therapies per order  - Administer medications as ordered  - Instruct and encourage patient and family to use good hand hygiene technique  - Identify and instruct in appropriate isolation precautions for identified infection/condition  Outcome: Progressing  Goal: Absence of fever/infection during neutropenic period  Description: INTERVENTIONS:  - Monitor WBC    Outcome: Progressing     Problem: SAFETY ADULT  Goal: Patient will remain free of falls  Description: INTERVENTIONS:  - Educate patient/family on patient safety including physical limitations  - Instruct patient to call for assistance with activity   - Consult OT/PT to assist with strengthening/mobility   - Keep Call bell within reach  - Keep bed low and locked with side rails adjusted as appropriate  - Keep care items and personal belongings within reach  - Initiate and maintain comfort rounds  - Make Fall Risk Sign visible to staff  - Offer Toileting every 2 Hours, in advance of need  - Initiate/Maintain bed/chair alarm  - Obtain necessary fall risk management equipment: nonskid footwear  - Apply yellow socks and bracelet for high fall risk patients  - Consider moving patient to room near nurses station  Outcome: Progressing  Goal: Maintain or return to baseline ADL function  Description: INTERVENTIONS:  -  Assess patient's ability to carry out ADLs; assess patient's baseline for ADL function and identify physical deficits which impact ability to perform ADLs (bathing, care of mouth/teeth, toileting, grooming, dressing, etc )  - Assess/evaluate cause of self-care deficits   - Assess range of motion  - Assess patient's mobility; develop plan if impaired  - Assess patient's need for assistive devices and provide as appropriate  - Encourage maximum independence but intervene and supervise when necessary  - Involve family in performance of ADLs  - Assess for home care needs following discharge   - Consider OT consult to assist with ADL evaluation and planning for discharge  - Provide patient education as appropriate  Outcome: Progressing  Goal: Maintains/Returns to pre admission functional level  Description: INTERVENTIONS:  - Perform BMAT or MOVE assessment daily    - Set and communicate daily mobility goal to care team and patient/family/caregiver  - Collaborate with rehabilitation services on mobility goals if consulted  - Perform Range of Motion 3 times a day  - Reposition patient every 2 hours    - Dangle patient 3 times a day  - Stand patient 3 times a day  - Ambulate patient 3 times a day  - Out of bed to chair 3 times a day   - Out of bed for meals 3 times a day  - Out of bed for toileting  - Record patient progress and toleration of activity level   Outcome: Progressing     Problem: DISCHARGE PLANNING  Goal: Discharge to home or other facility with appropriate resources  Description: INTERVENTIONS:  - Identify barriers to discharge w/patient and caregiver  - Arrange for needed discharge resources and transportation as appropriate  - Identify discharge learning needs (meds, wound care, etc )  - Arrange for interpretive services to assist at discharge as needed  - Refer to Case Management Department for coordinating discharge planning if the patient needs post-hospital services based on physician/advanced practitioner order or complex needs related to functional status, cognitive ability, or social support system  Outcome: Progressing     Problem: Prexisting or High Potential for Compromised Skin Integrity  Goal: Skin integrity is maintained or improved  Description: INTERVENTIONS:  - Identify patients at risk for skin breakdown  - Assess and monitor skin integrity  - Assess and monitor nutrition and hydration status  - Monitor labs   - Assess for incontinence   - Turn and reposition patient  - Assist with mobility/ambulation  - Relieve pressure over bony prominences  - Avoid friction and shearing  - Provide appropriate hygiene as needed including keeping skin clean and dry  - Evaluate need for skin moisturizer/barrier cream  - Collaborate with interdisciplinary team   - Patient/family teaching  - Consider wound care consult   Outcome: Progressing     Problem: MOBILITY - ADULT  Goal: Maintain or return to baseline ADL function  Description: INTERVENTIONS:  -  Assess patient's ability to carry out ADLs; assess patient's baseline for ADL function and identify physical deficits which impact ability to perform ADLs (bathing, care of mouth/teeth, toileting, grooming, dressing, etc )  - Assess/evaluate cause of self-care deficits   - Assess range of motion  - Assess patient's mobility; develop plan if impaired  - Assess patient's need for assistive devices and provide as appropriate  - Encourage maximum independence but intervene and supervise when necessary  - Involve family in performance of ADLs  - Assess for home care needs following discharge   - Consider OT consult to assist with ADL evaluation and planning for discharge  - Provide patient education as appropriate  Outcome: Progressing  Goal: Maintains/Returns to pre admission functional level  Description: INTERVENTIONS:  - Perform BMAT or MOVE assessment daily    - Set and communicate daily mobility goal to care team and patient/family/caregiver  - Collaborate with rehabilitation services on mobility goals if consulted  - Perform Range of Motion 3 times a day  - Reposition patient every 2 hours  - Dangle patient 3 times a day  - Stand patient 3 times a day  - Ambulate patient 3 times a day  - Out of bed to chair 3 times a day   - Out of bed for meals 3 times a day  - Out of bed for toileting  - Record patient progress and toleration of activity level   Outcome: Progressing     Problem: Nutrition/Hydration-ADULT  Goal: Nutrient/Hydration intake appropriate for improving, restoring or maintaining nutritional needs  Description: Monitor and assess patient's nutrition/hydration status for malnutrition  Collaborate with interdisciplinary team and initiate plan and interventions as ordered  Monitor patient's weight and dietary intake as ordered or per policy  Utilize nutrition screening tool and intervene as necessary  Determine patient's food preferences and provide high-protein, high-caloric foods as appropriate       INTERVENTIONS:  - Monitor oral intake, urinary output, labs, and treatment plans  - Assess nutrition and hydration status and recommend course of action  - Evaluate amount of meals eaten  - Assist patient with eating if necessary   - Allow adequate time for meals  - Recommend/ encourage appropriate diets, oral nutritional supplements, and vitamin/mineral supplements  - Order, calculate, and assess calorie counts as needed  - Recommend, monitor, and adjust tube feedings and TPN/PPN based on assessed needs  - Assess need for intravenous fluids  - Provide specific nutrition/hydration education as appropriate  - Include patient/family/caregiver in decisions related to nutrition  Outcome: Progressing

## 2023-06-01 NOTE — PROGRESS NOTES
06/01/23 1500   Pain Assessment   Pain Assessment Tool 0-10   Pain Score No Pain   Restrictions/Precautions   Precautions Aphasia; Aspiration;Bed/chair alarms;Cognitive; Fall Risk; [place]; Visual deficit;Supervision on toilet/commode; Impulsive   Weight Bearing Restrictions No   Comprehension   Comprehension (FIM) 3 - Understands basic info/conversation 50-74% of time   Expression   Expression (FIM) 3 - Expresses basic info/needs 50-74% of time   Social Interaction   Social Interaction (FIM) 4 - Interacts 75-89% of time   Problem Solving   Problem solving (FIM) 3 - Solves basic problmes 50-74% of time   Memory   Memory (FIM) 3 - Recognizes, recalls/performs 50-74%   Speech/Language/Cognition Assessmetn   Treatment Assessment Pt seen for skilled speech therapy session targeting expressive and receptive communication skills  SLP engaged in conversation with pt as its been a while since this SLP worked with him  Engaged in recall of daily events as well as recovery process  Noted pt with MUCH improved connected and fluent speech as compared to previous session with this SLP  Pt conts with some hesistations and vague descriptions but marketedly improved in regards to basic conversational skills  Pt conts to need cuing and support for more specific details when recalling activities but noted pt using gestural cues and increased time to allow himself to communicate his thoughts  Pt engaged in the following:    Verbal Expression:  Object naming by description- able to complete with 5/10acc increasing with verbal cuing with hierarchy of semantic cues, phrase completion, initial letter, and phonemic  Pt next completed word description task to work on word finding strategies for increased independence with communication  Pt given a targeted word and then encouraged to try to think of other associated words to describe or that relate to that word   This technique would allow pt to attempt to cue self or allow listener more input "as to context of what word he is searching for  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 however, observed with groping for sounds, or stating a word/sound and pt immediately stating \"no no\" recognizing this was error  Pt overall required moderate to maximal cuing and prompting for task, however observed with good effort, and decreased frustrations  Reading comprehension/Listening Comprehension:  Pt presented with phrase completion task and Fx3 options to pick from  Provided pt with visual anchor of highlighter line on page for him to ensure scanning all the way to the right  Pt attempted reading himself however with increased difficulty, therefore SLP would start and read phrase for him  Pt then able to select appropriate word to complete from Fx3 choices with 7/9acc increasing with additional verbal cues and reading the multiple choice options to him  Pt also noted to improve with paper anchor covering up unwanted stimulus to better focus visual scanning on target task  Pt c/o right leg cramping towards end of session and requesting transfer to bed  Pt assisted x2 to bed, repositioned fully upright with all items in reach  Pt conts with receptive and expressive communication difficulties but is making gains to more functional communication of wants and needs  Pt will cont to benefit from skilled SLP services targeting communication skills for increased independence and decreased burden of care at this time  SLP Therapy Minutes   SLP Time In 1500   SLP Time Out 2639   SLP Total Time (minutes) 70   SLP Mode of treatment - Individual (minutes) 70   SLP Mode of treatment - Concurrent (minutes) 0   SLP Mode of treatment - Group (minutes) 0   SLP Mode of treatment - Co-treat (minutes) 0   SLP Mode of Treatment - Total time(minutes) 70 minutes   SLP Cumulative Minutes 1115   Therapy Time missed   Time missed?  No       "

## 2023-06-01 NOTE — CASE MANAGEMENT
Phone call placed to pts wife and reviewed team update and plan for a transition to subacute setting next week as long as medically stable  Cm reviewed 4 options available per [facility] in network with insurance within a 10 mile radius  Cm offered to expand the mileage if she wishes for additional choices  She asked for the list of 4 facilities to be left in pts room and she will pick them up this afternoon  Huma Brooks inquired about neuropsych eval for capacity and expected it to be done yesterday  Cm confirmed with dr Tracie Culp the order was made and it is scheduled for today 3pm  Cm phoned Huma Brooks back and made her aware

## 2023-06-01 NOTE — PLAN OF CARE
Problem: PAIN - ADULT  Goal: Verbalizes/displays adequate comfort level or baseline comfort level  Description: Interventions:  - Encourage patient to monitor pain and request assistance  - Assess pain using appropriate pain scale  - Administer analgesics based on type and severity of pain and evaluate response  - Implement non-pharmacological measures as appropriate and evaluate response  - Consider cultural and social influences on pain and pain management  - Notify physician/advanced practitioner if interventions unsuccessful or patient reports new pain  Outcome: Progressing     Problem: INFECTION - ADULT  Goal: Absence or prevention of progression during hospitalization  Description: INTERVENTIONS:  - Assess and monitor for signs and symptoms of infection  - Monitor lab/diagnostic results  - Monitor all insertion sites, i e  indwelling lines, tubes, and drains  - Monitor endotracheal if appropriate and nasal secretions for changes in amount and color  - Assaria appropriate cooling/warming therapies per order  - Administer medications as ordered  - Instruct and encourage patient and family to use good hand hygiene technique  - Identify and instruct in appropriate isolation precautions for identified infection/condition  Outcome: Progressing  Goal: Absence of fever/infection during neutropenic period  Description: INTERVENTIONS:  - Monitor WBC    Outcome: Progressing     Problem: SAFETY ADULT  Goal: Patient will remain free of falls  Description: INTERVENTIONS:  - Educate patient/family on patient safety including physical limitations  - Instruct patient to call for assistance with activity   - Consult OT/PT to assist with strengthening/mobility   - Keep Call bell within reach  - Keep bed low and locked with side rails adjusted as appropriate  - Keep care items and personal belongings within reach  - Initiate and maintain comfort rounds  - Make Fall Risk Sign visible to staff  - Offer Toileting urs, in advance of need  - Initia  - Obtain necessary fall risk  - Apply yellow socks and bracelet for high fall risk patients  - Consider moving patient to room near nurses station  Outcome: Progressing  Goal: Maintain or return to baseline ADL function  Description: INTERVENTIONS:  -  Assess patient's ability to carry out ADLs; assess patient's baseline for ADL function and identify physical deficits which impact ability to perform ADLs (bathing, care of mouth/teeth, toileting, grooming, dressing, etc )  - Assess/evaluate cause of self-care deficits   - Assess range of motion  - Assess patient's mobility; develop plan if impaired  - Assess patient's need for assistive devices and provide as appropriate  - Encourage maximum independence but intervene and supervise when necessary  - Involve family in performance of ADLs  - Assess for home care needs following discharge   - Consider OT consult to assist with ADL evaluation and planning for discharge  - Provide patient education as appropriate  Outcome: Progressing  Goal: Maintains/Returns to pre admission functional level  Description: INTERVENTIONS:  - Perform BMAT or MOVE assessment daily    - Set and communicate daily mobility goal to care team and patient/family/caregiver  - Collaborate with rehabilitation services on mobility goals if consulted  - Perform Range ofmes a day  - Reposition patienturs    - Dangle patie  - Stand juana  - Ambulate juana  - Out of bed to   - Out of bed for   - Out of bed for toileting  - Record patient progress and toleration of activity level   Outcome: Progressing     Problem: DISCHARGE PLANNING  Goal: Discharge to home or other facility with appropriate resources  Description: INTERVENTIONS:  - Identify barriers to discharge w/patient and caregiver  - Arrange for needed discharge resources and transportation as appropriate  - Identify discharge learning needs (meds, wound care, etc )  - Arrange for interpretive services to assist at discharge as needed  - Refer to Case Management Department for coordinating discharge planning if the patient needs post-hospital services based on physician/advanced practitioner order or complex needs related to functional status, cognitive ability, or social support system  Outcome: Progressing     Problem: Prexisting or High Potential for Compromised Skin Integrity  Goal: Skin integrity is maintained or improved  Description: INTERVENTIONS:  - Identify patients at risk for skin breakdown  - Assess and monitor skin integrity  - Assess and monitor nutrition and hydration status  - Monitor labs   - Assess for incontinence   - Turn and reposition patient  - Assist with mobility/ambulation  - Relieve pressure over bony prominences  - Avoid friction and shearing  - Provide appropriate hygiene as needed including keeping skin clean and dry  - Evaluate need for skin moisturizer/barrier cream  - Collaborate with interdisciplinary team   - Patient/family teaching  - Consider wound care consult   Outcome: Progressing     Problem: MOBILITY - ADULT  Goal: Maintain or return to baseline ADL function  Description: INTERVENTIONS:  -  Assess patient's ability to carry out ADLs; assess patient's baseline for ADL function and identify physical deficits which impact ability to perform ADLs (bathing, care of mouth/teeth, toileting, grooming, dressing, etc )  - Assess/evaluate cause of self-care deficits   - Assess range of motion  - Assess patient's mobility; develop plan if impaired  - Assess patient's need for assistive devices and provide as appropriate  - Encourage maximum independence but intervene and supervise when necessary  - Involve family in performance of ADLs  - Assess for home care needs following discharge   - Consider OT consult to assist with ADL evaluation and planning for discharge  - Provide patient education as appropriate  Outcome: Progressing  Goal: Maintains/Returns to pre admission functional level  Description: INTERVENTIONS:  - Perform BMAT or MOVE assessment daily    - Set and communicate daily mobility goal to care team and patient/family/caregiver  - Collaborate with rehabilitation services on mobility goals if consulted  - Perform Range ofes a day  - Reposition patientrs  - Dangle pat  - Out of bed to spencer  - Out of bed for me  - Out of bed for toileting  - Record patient progress and toleration of activity level   Outcome: Progressing     Problem: Nutrition/Hydration-ADULT  Goal: Nutrient/Hydration intake appropriate for improving, restoring or maintaining nutritional needs  Description: Monitor and assess patient's nutrition/hydration status for malnutrition  Collaborate with interdisciplinary team and initiate plan and interventions as ordered  Monitor patient's weight and dietary intake as ordered or per policy  Utilize nutrition screening tool and intervene as necessary  Determine patient's food preferences and provide high-protein, high-caloric foods as appropriate       INTERVENTIONS:  - Monitor oral intake, urinary output, labs, and treatment plans  - Assess nutrition and hydration status and recommend course of action  - Evaluate amount of meals eaten  - Assist patient with eating if necessary   - Allow adequate time for meals  - Recommend/ encourage appropriate diets, oral nutritional supplements, and vitamin/mineral supplements  - Order, calculate, and assess calorie counts as needed  - Recommend, monitor, and adjust tube feedings and TPN/PPN based on assessed needs  - Assess need for intravenous fluids  - Provide specific nutrition/hydration education as appropriate  - Include patient/family/caregiver in decisions related to nutrition  Outcome: Progressing

## 2023-06-01 NOTE — PROGRESS NOTES
NEUROLOGY RESIDENCY PROGRESS NOTE     Name: Rocael Garcia   Age & Sex: 58 y o  male   MRN: [id number]  Unit/Bed#: -01   Encounter: [id number]    Follow up with vascular neurology attending in 4-6 weeks  Pt follows up with Dr Kp Carlson      Pending for discharge: None from neuro     ASSESSMENT & PLAN     * Stroke [facility])  Assessment & Plan  58year old male with HTN, HLD, controlled DM2, prior R MCA CVA s/p thrombectomy in March 2019 w/ no etiology found despite years of loop recorder (and was maintained on Plavix 75mg after that), known severe R M1 stenosis, prior tobacco use, recent L MCA stroke (had aphasia and was empirically AC eliquis 5mg BID w/ ASA 81mg at that time due to ESUSx2) 04/16/2023, readmitted for stroke alert 4/26, noted multifocal strokes, right and left anterior circulation maintained on eliquis and Plavix, on 5/1/23 patient noted to have worse in exam, unsteady walk, right sided weakness, repeat MRI revealed 5/2/23- Worsened recent infarcts with new areas of acute ischemia in similar distribution involving left parietotemporal and occipital periventricular and deep white matter, underwent cerebral angiogram on 5/4, negative for vasculitis, s/p stent in left carotid artery, he was started on aspirin and brilinta and eliquis was d/sylvia  Pt d/sylvia to rehab on 5/12/23  Neurology reconsulted on 5/30 as pt had repeat CT head after he was bit off and possibly worsening dysarthria, exam otherwise unchanged also positive for UTI     5/30/23- MRI brain Similar areas of left parietal occipital recent ischemia/infarct  Some new areas of acute to subacute left parietal/occipital acute to subacute ischemia  Possibly evaluation of similar strokes    Exam-patient oriented x 2, right homonymous hemianopia, mixed aphasia, expressive worse than receptive, right-left confusion, tremors when doing finger-nose testing on left, no focal weakness on strength testing       Impression- New DWI changes in similar vascular distribution as before likely the evolution of same stroke as seen previously vs vessel to vessel ds, adding Cilastazole   His  possible worsening dysarthria and bit of could be related to UTI    Plan-  · Start Cilostazol 100 mg twice daily  · Continue aspirin 81 mg daily and Brilinta 90 mg twice daily  · Discussed with patient's wife over the phone who is in agreement with the plan   · Discussed with Dr Kay Boone and Dr Feliberto Whitlock about the plan   · [facility]       Prior work up-      CTA- Stable moderate (60 to 65%) stenosis in the bilateral internal carotid arteries  Stable severe stenosis distal right M1 segment  Stable severe stenosis proximal left M2    Prior work-up including IVY (no PFO) and loop recorder for 3 years without evidence of Afib  CT of chest abdomen and pelvis with no evidence of malignancy 2023  Thrombosis panel done in 2023 that was only abnl for AT3 that was low in the acute setting  SUBJECTIVE     Patient was seen and examined  No acute events overnight  Patient reports doing well, denies any new symptoms      OBJECTIVE     Patient ID: Andrey Farias is a 58 y o  male  Vitals:    23 0134 23 0530 23 0833 23 1340   BP: 140/80 138/64 132/58 138/62   BP Location:   Right arm Right arm   Pulse:  69 68 66   Resp:  18 18 18   Temp:  97 7 °F (36 5 °C)  98 6 °F (37 °C)   TempSrc:  Oral  Oral   SpO2:  98%  98%   Weight:          Temperature:   Temp (24hrs), Av 3 °F (36 8 °C), Min:97 7 °F (36 5 °C), Max:98 6 °F (37 °C)    Temperature: 98 6 °F (37 °C)      Physical Exam  Vitals and nursing note reviewed  Constitutional:       General: He is not in acute distress  Appearance: He is well-developed  HENT:      Head: Normocephalic and atraumatic  Eyes:      Conjunctiva/sclera: Conjunctivae normal    Cardiovascular:      Rate and Rhythm: Normal rate     Pulmonary:      Effort: Pulmonary effort is normal  No respiratory distress  Abdominal:      Palpations: Abdomen is soft  Tenderness: There is no abdominal tenderness  Musculoskeletal:         General: No swelling  Cervical back: Neck supple  Skin:     General: Skin is warm and dry  Capillary Refill: Capillary refill takes less than 2 seconds  Neurological:      Mental Status: He is alert  Psychiatric:         Mood and Affect: Mood normal           Neurological Examination:      Mental Status: The patient was awake, alert, attentive, able to tell me his name, that he is in the hospital with options, unable to tell me the date month or the year, when given cues he said maybe summer, he is still aphasic able to name few objects like glove, remote unable to name other objects  Mixed aphasia some difficulty following complex,  follow simple commands without difficulty  No dysarthria noted     Cranial Nerves:   I: smell Not tested   II: visual fields -right hemianopia-difficult to assess due to aphasia, pupils equal, round, reactive to light with normal accomodation  III,IV,VI: extraocular muscles EOMI, no nystagmus   V: Sensation in the V1 through V3 distributions intact to light touch bilaterally  VII: Face is symmetric with no weakness noted  VIII: Audition intact   IX/X: Uvula midline  XI: Trapezius strength intact  XII: Tongue midline with no atrophy or fasciculations with appropriate movement       Motor Examination:   No pronator drift   Bulk: Normal  No atrophy Tone: Normal  Fasciculations: None                   Deltoid Biceps Triceps WE   WF   FF IO     Right        5         5          5         5      5      5   5        Left           5        5          5          5      5     5   5                        IP        Quad   Ham     TA       Gastroc   Right      5            5          5         5                5  Left         5            5         5         5                5     Mild tremors noted when doing finger-nose testing using left arm     Clonus: None     Pathological Reflexes:  Hoffmans: negative  Babinsky: negative  Jaw Jerk: negative     Coordination: Tremors noted in left hand when doing finger-nose testing     Sensory: Normal sensation to light touch,  Has some right left confusion  Able to feel touch in bilateral when giving stimuli at the same time  LABORATORY DATA     Labs: I have personally reviewed pertinent reports  Results from last 7 days   Lab Units 06/01/23  0547 05/28/23  0507   EOS PCT % 10* 7*   HEMATOCRIT % 36 0* 36 5   HEMOGLOBIN g/dL 12 4 12 0   MONOS PCT % 11 10   NEUTROS PCT % 55 52   PLATELETS Thousands/uL 252 277   WBC Thousand/uL 8 32 10 69*      Results from last 7 days   Lab Units 06/01/23  0547 05/28/23  0507   BUN mg/dL 15 19   CALCIUM mg/dL 9 1 9 1   CHLORIDE mmol/L 111* 109*   CO2 mmol/L 24 26   CREATININE mg/dL 0 93 1 04   POTASSIUM mmol/L 3 6 3 6   SODIUM mmol/L 138 138     Results from last 7 days   Lab Units 05/28/23  0507   MAGNESIUM mg/dL 2 3                        IMAGING & DIAGNOSTIC TESTING     Radiology Results: I have personally reviewed pertinent reports  CTA head and neck w wo contrast   Final Result by Reynaldo Otto DO (05/31 1641)      CT brain: Subacute ischemia within the left hemisphere without mass effect or taiwo hemorrhagic transformation  Advanced chronic microangiopathic change within the brain parenchyma  CT angiogram: Stable atherosclerotic disease of the right carotid bifurcation including the ICA origin and midportion of the bulb, approximately 60 to 70% at the point of maximal stenosis  A carotid stent has been placed on the left with only mild residual narrowing within the proximal internal carotid artery  Stable severe stenosis of the distal right M1 segment with decreased size and number of M2 branches        Workstation performed: [id number]         MRI brain wo contrast   Final Result by [facility] Paul Crain MD (05/30 8177)      Similar areas of left parietal occipital recent ischemia/infarct  Some new areas of acute to subacute left parietal/occipital acute to subacute ischemia  Findings suggesting normal pressure hydrocephalus  Findings were marked as \"immediate\"in Epic and will now be related to the ordering physician or covering clinical team by the radiology liaison  Workstation performed: [id number]         CT head wo contrast   Final Result by Thomas Mcguire MD (05/30 9327)      Slight interval progression and extension of the nonhemorrhagic acute to early subacute left cerebral infarction noted on the recent MRI study  Mild regional sulcal crowding but no significant mass effect on the underlying ventricular system or evidence    of herniation  Redemonstrated disproportionate ventriculomegaly to sulcal prominence which can be seen in patients with normal pressure hydrocephalus  Background cerebral white matter changes consistent with moderate chronic microangiopathic disease  The study was marked in [facility] for immediate notification  Workstation performed: [id number]         US thyroid    (Results Pending)       Other Diagnostic Testing: I have personally reviewed pertinent reports        ACTIVE MEDICATIONS     Current Facility-Administered Medications   Medication Dose Route Frequency   • acetaminophen (TYLENOL) tablet 975 mg  975 mg Oral TID PRN   • aspirin (ECOTRIN LOW STRENGTH) EC tablet 81 mg  81 mg Oral Daily   • atorvastatin (LIPITOR) tablet 40 mg  40 mg Oral Daily   • baclofen tablet 5 mg  5 mg Oral BID PRN   • bisacodyl (DULCOLAX) rectal suppository 10 mg  10 mg Rectal Daily PRN   • calcium carbonate (TUMS) chewable tablet 1,000 mg  1,000 mg Oral Daily PRN   • citalopram (CeleXA) tablet 20 mg  20 mg Oral Daily   • donepezil (ARICEPT) tablet 5 mg  5 mg Oral BID   • heparin (porcine) subcutaneous injection 5,000 Units  5,000 Units Subcutaneous Q8H [address]   • " levofloxacin (LEVAQUIN) tablet 750 mg  750 mg Oral Q24H   • lidocaine (LIDODERM) 5 % patch 1 patch  1 patch Topical Daily   • lidocaine (URO-JET) 2 % urethral/mucosal gel   Topical Q4H PRN   • losartan (COZAAR) tablet 50 mg  50 mg Oral Daily   • menthol-methyl salicylate (BENGAY) 14-28 % cream   Apply externally 4x Daily PRN   • metoprolol succinate (TOPROL-XL) 24 hr tablet 100 mg  100 mg Oral Daily   • ondansetron (ZOFRAN-ODT) dispersible tablet 4 mg  4 mg Oral Q6H PRN   • oxyCODONE (ROXICODONE) IR tablet 5 mg  5 mg Oral Q6H PRN   • oxyCODONE (ROXICODONE) split tablet 2 5 mg  2 5 mg Oral Q6H PRN   • pantoprazole (PROTONIX) EC tablet 20 mg  20 mg Oral Early Morning   • polyethylene glycol (MIRALAX) packet 17 g  17 g Oral QAM   • senna (SENOKOT) tablet 17 2 mg  2 tablet Oral Daily PRN   • senna-docusate sodium (SENOKOT S) 8 6-50 mg per tablet 2 tablet  2 tablet Oral BID   • tamsulosin (FLOMAX) capsule 0 8 mg  0 8 mg Oral Daily With Dinner   • ticagrelor (BRILINTA) tablet 90 mg  90 mg Oral Q12H JOSE   • traZODone (DESYREL) tablet 50 mg  50 mg Oral HS       Prior to Admission medications    Medication Sig Start Date End Date Taking?  Authorizing Provider   acetaminophen (TYLENOL) 325 mg tablet Take 3 tablets (975 mg total) by mouth every 8 (eight) hours 5/12/23   Storm Pavon MD   aspirin (ECOTRIN LOW STRENGTH) 81 mg EC tablet Take 1 tablet (81 mg total) by mouth daily Do not start before April 19, 2023 4/19/23   Aminah WHYTE PA-C   atorvastatin (LIPITOR) 40 mg tablet Take 40 mg by mouth daily with breakfast    Historical Provider, MD   citalopram (CeleXA) 20 mg tablet Take 20 mg by mouth daily    Historical Provider, MD   donepezil (ARICEPT) 5 mg tablet TAKE 1 TABLET TWICE A DAY 2/7/23   Neri Guerrero MD   hydrochlorothiazide (HYDRODIURIL) 12 5 mg tablet Take 1 tablet (12 5 mg total) by mouth daily Do not start before May 13, 2023  5/13/23   Storm Pavon MD   lidocaine (Lidoderm) 5 % Apply 1 patch topically over 12 hours daily Remove & Discard patch within 12 hours or as directed by MD 4/26/23   Jai Soria DO   losartan (COZAAR) 50 mg tablet Take 50 mg by mouth daily    Historical Provider, MD   metFORMIN (GLUCOPHAGE) 500 mg tablet Take 1 tablet (500 mg total) by mouth daily with breakfast 4/6/19   Vitaly Delgadillo MD   methocarbamol (ROBAXIN) 500 mg tablet Take 1 tablet (500 mg total) by mouth every 6 (six) hours as needed for muscle spasms 5/12/23   Alonzo Jerome MD   metoprolol succinate (TOPROL-XL) 100 mg 24 hr tablet Take 100 mg by mouth daily    Historical Provider, MD   omeprazole (PriLOSEC OTC) 20 MG tablet Take 20 mg by mouth daily    Historical Provider, MD   polyethylene glycol (MIRALAX) 17 g packet Take 17 g by mouth every morning Do not start before May 13, 2023  5/13/23   Alonzo Jerome MD   senna-docusate sodium (SENOKOT S) 8 6-50 mg per tablet Take 2 tablets by mouth 2 (two) times a day 5/12/23   Alonzo Jerome MD   sildenafil (REVATIO) 20 mg tablet TAKE 2 3 TABLETS BY MOUTH AS NEEDED FOR SEXUAL ACTIVITY 7/12/19   Historical Provider, MD   tamsulosin (FLOMAX) 0 4 mg Take 0 4 mg by mouth daily with breakfast    Historical Provider, MD   ticagrelor (BRILINTA) 90 MG Take 1 tablet (90 mg total) by mouth every 12 (twelve) hours 5/12/23   Alonzo Jerome MD         VTE Pharmacologic Prophylaxis: Enoxaparin (Lovenox)  VTE Mechanical Prophylaxis: sequential compression device    ==  MD [facility] Res[facility], PGY-4

## 2023-06-01 NOTE — ASSESSMENT & PLAN NOTE
IMPRESSION:  The following meet current ACR criteria for recommending ultrasound guided biopsy:  - RIGHT upper pole nodule measuring 1 6 x 1 0 x 1 4 cm  - Discussed above with IM and they recommend biopsy after discharge from [facility]   - Discussed with wife who is aware and will ensure follow-up with PCP or SNF provider with 2-4 weeks - referral to endocrine or other provider at their discretion     Incidental finding on CTA 6/1  SOFT TISSUES OF THE NECK: Several hypodense nodules are scattered within the thyroid gland  The largest of these is located on the right measuring 1 5 cm in craniocaudad dimension  This has gradually increased in size when compared with prior examination;  from 2019  Incidental discovery of one or more thyroid nodule(s) measuring more than 1 5 cm and without suspicious features is noted in this patient who is above 28years old; according to guidelines published in the February 2015 white paper on incidental thyroid nodules in the Journal of the [employer] of Radiology Oleg Moya), further characterization with thyroid ultrasound is recommended  - Thyroid US 6/1  FINDINGS:  Normal homogeneous smooth echotexture      Right lobe: 5 1 x 2 2 x 2 1 cm  Volume 11 5 mL  Left lobe:  4 5 x 2 1 x 1 2 cm  Volume 5 4 mL  Isthmus: 0 3  cm      Nodule #1  Image 25  RIGHT upper pole nodule measuring 1 6 x 1 0 x 1 4 cm  No priors for comparison  COMPOSITION:  2 points, solid or almost completely solid   ECHOGENICITY:  2 points, hypoechoic  SHAPE:  0 points, wider-than-tall  MARGIN: 0 points, smooth  ECHOGENIC FOCI:  0 points, none or large comet-tail artifacts  TI-RADS Classification: TR 4 (4-6 points),  FNA if > 1 5 cm  Follow if > 1cm      Nodule #2  Image 30  RIGHT lower pole nodule measuring 1 5 x 0 9 x 1 5 cm  No priors for comparison  COMPOSITION:  1 point, mixed cystic and solid  ECHOGENICITY:  1 point, hyperechoic or isoechoic  SHAPE:  0 points, wider-than-tall    MARGIN: 0 points, smooth  ECHOGENIC FOCI:  0 points, none or large comet-tail artifacts  TI-RADS Classification: TR 2 (2 points), Not suspicious  No FNA      Additional cystic nodules do not meet criteria for follow-up

## 2023-06-01 NOTE — PROGRESS NOTES
"OT Daily Treatment Note       06/01/23 0700   Pain Assessment   Pain Assessment Tool 0-10   Pain Score No Pain   Restrictions/Precautions   Precautions Aphasia; Aspiration;Bed/chair alarms;Cognitive; Fall Risk;Impulsive;Supervision on toilet/commode;Visual deficit   Lifestyle   Autonomy \"I think I have use the bathroom right now\"   Eating   Type of Assistance Needed Physical assistance   Physical Assistance Level 25% or less   Comment seated in WC; full setup of tray required  min A to place food on utensil  able to grab bagel and eat without assistance or cues  He Shirley took over at end of session 2* time restraints   Eating CARE Score 3   Oral Hygiene   Type of Assistance Needed Set-up / clean-up   Physical Assistance Level No physical assistance   Comment seated in [facility]   Oral Hygiene CARE Score 5   Shower/Bathe Self   Type of Assistance Needed Physical assistance   Physical Assistance Level 26%-50%   Comment sponge bath completed at EOB; min A for bathing distal LE and buttock   Shower/Bathe Self CARE Score 3   Upper Body Dressing   Type of Assistance Needed Physical assistance   Physical Assistance Level 25% or less   Comment min A for RLE threading only   Upper Body Dressing CARE Score 3   Lower Body Dressing   Type of Assistance Needed Physical assistance   Physical Assistance Level 51%-75%   Comment completed in supine; TA to thread over BLE and jo mgmt,pt able to don over hips via bridging without physical assist   Lower Body Dressing CARE Score 2   Putting On/Taking Off Footwear   Type of Assistance Needed Physical assistance   Physical Assistance Level 76% or more   Comment pt able to doff L sock; requires assist to doff R sock, don socks and sneakers     Putting On/Taking Off Footwear CARE Score 2   Roll Left and Right   Type of Assistance Needed Supervision   Physical Assistance Level No physical assistance   Comment use of bedrails   Roll Left and Right CARE Score 4   Lying to Sitting on Side of Bed " Type of Assistance Needed Supervision   Physical Assistance Level No physical assistance   Comment CS and inc time   Lying to Sitting on Side of Bed CARE Score 4   Sit to Stand   Type of Assistance Needed Physical assistance   Physical Assistance Level 25% or less   Comment min A with RW to stand from bed and again later from [facility] height   Sit to Stand CARE Score 3   Bed-Chair Transfer   Type of Assistance Needed Physical assistance   Physical Assistance Level 26%-50%   Comment min/mod A SPT with HHA from bed > WC to the left to allow for pt to visually see where he is transferring to   Chair/Bed-to-Chair Transfer CARE Score 3   [address] [place]   Type of Assistance Needed Physical assistance   Physical Assistance Level Total assistance   Comment seated on wide BSC; 1st person to provide CGA in stance wtih RW while 2nd person completed boaz care/CM  pt most likely could have completed with Ax1 but due to concerns for safety in stance, 2nd person was present  Toileting Hygiene CARE Score 1   Toilet Transfer   Type of Assistance Needed Physical assistance   Physical Assistance Level 26%-50%   Comment min/mod A SPT with HHA from bed > BSC to the left to allow for pt to visually see where he is transferring to   Toilet Transfer CARE Score 3   Cognition   Overall Cognitive Status Impaired   Arousal/Participation Alert; Cooperative   Attention Attends with cues to redirect   Orientation Level Oriented to person;Oriented to place   Memory Decreased recall of precautions;Decreased recall of recent events;Decreased short term memory   Following Commands Follows one step commands inconsistently   Activity Tolerance   Activity Tolerance Patient tolerated treatment well   Assessment   Treatment Assessment Pt participated in skilled OT session with focus on ADL retraining, functional transfer training, compensatory technique education and continued education  See flowsheet for details of session and current functional status  Pt is limited by weakness, decreased ROM, impaired balance, decreased endurance, decreased coordination, increased fall risk, decreased ADLS, decreased IADLS, pain, decreased activity tolerance, decreased safety awareness, impaired judgement, decreased cognition, decreased sensation, decreased strength and visual deficits and requires skilled OT services to increase independence and safety with ADL completion in prep for DC SNF  Plan to continue ADL retraining, functional transfer training, UE strengthening/ROM, endurance training, Pt/caregiver education, equipment evaluation/education, compensatory technique education, continued education, energy conservation and activity engagement  to address barriers mentioned above  Prognosis Fair   Problem List Decreased strength;Decreased range of motion;Decreased endurance; Impaired balance;Decreased mobility; Decreased coordination;Decreased cognition; Impaired judgement;Decreased safety awareness; Impaired vision; Impaired sensation;Pain   Barriers to Discharge Inaccessible home environment;Decreased caregiver support   Plan   Treatment/Interventions ADL retraining;Functional transfer training; Therapeutic exercise; Endurance training;Patient/family training; Compensatory technique education   Progress Progressing toward goals   OT Therapy Minutes   OT Time In 0700   OT Time Out 0800   OT Total Time (minutes) 60   OT Mode of treatment - Individual (minutes) 60   OT Mode of treatment - Concurrent (minutes) 0   OT Mode of treatment - Group (minutes) 0   OT Mode of treatment - Co-treat (minutes) 0   OT Mode of Treatment - Total time(minutes) 60 minutes   OT Cumulative Minutes 1405   Therapy Time missed   Time missed?  No

## 2023-06-02 PROBLEM — M62.838 MUSCLE SPASMS OF BOTH LOWER EXTREMITIES: Status: ACTIVE | Noted: 2023-06-02

## 2023-06-02 PROBLEM — G91.2 NPH (NORMAL PRESSURE HYDROCEPHALUS) (HCC): Status: ACTIVE | Noted: 2023-06-02

## 2023-06-02 PROCEDURE — 99232 SBSQ HOSP IP/OBS MODERATE 35: CPT

## 2023-06-02 PROCEDURE — 97112 NEUROMUSCULAR REEDUCATION: CPT

## 2023-06-02 PROCEDURE — 97535 SELF CARE MNGMENT TRAINING: CPT

## 2023-06-02 PROCEDURE — 92507 TX SP LANG VOICE COMM INDIV: CPT

## 2023-06-02 PROCEDURE — 97530 THERAPEUTIC ACTIVITIES: CPT

## 2023-06-02 PROCEDURE — 99232 SBSQ HOSP IP/OBS MODERATE 35: CPT | Performed by: INTERNAL MEDICINE

## 2023-06-02 RX ADMIN — TAMSULOSIN HYDROCHLORIDE 0.8 MG: 0.4 CAPSULE ORAL at 16:24

## 2023-06-02 RX ADMIN — PANTOPRAZOLE SODIUM 20 MG: 20 TABLET, DELAYED RELEASE ORAL at 06:12

## 2023-06-02 RX ADMIN — LEVOFLOXACIN 750 MG: 750 TABLET, FILM COATED ORAL at 11:53

## 2023-06-02 RX ADMIN — HEPARIN SODIUM 5000 UNITS: 5000 INJECTION INTRAVENOUS; SUBCUTANEOUS at 14:45

## 2023-06-02 RX ADMIN — HEPARIN SODIUM 5000 UNITS: 5000 INJECTION INTRAVENOUS; SUBCUTANEOUS at 06:18

## 2023-06-02 RX ADMIN — BACLOFEN 5 MG: 10 TABLET ORAL at 12:10

## 2023-06-02 RX ADMIN — CITALOPRAM HYDROBROMIDE 20 MG: 20 TABLET ORAL at 10:08

## 2023-06-02 RX ADMIN — DONEPEZIL HYDROCHLORIDE 5 MG: 5 TABLET ORAL at 10:08

## 2023-06-02 RX ADMIN — LIDOCAINE 5% 1 PATCH: 700 PATCH TOPICAL at 10:09

## 2023-06-02 RX ADMIN — TRAZODONE HYDROCHLORIDE 50 MG: 50 TABLET ORAL at 22:00

## 2023-06-02 RX ADMIN — HEPARIN SODIUM 5000 UNITS: 5000 INJECTION INTRAVENOUS; SUBCUTANEOUS at 22:20

## 2023-06-02 RX ADMIN — ASPIRIN 81 MG: 81 TABLET, COATED ORAL at 10:08

## 2023-06-02 RX ADMIN — CILOSTAZOL 100 MG: 100 TABLET ORAL at 16:24

## 2023-06-02 RX ADMIN — CILOSTAZOL 100 MG: 100 TABLET ORAL at 06:12

## 2023-06-02 RX ADMIN — DONEPEZIL HYDROCHLORIDE 5 MG: 5 TABLET ORAL at 17:24

## 2023-06-02 RX ADMIN — TICAGRELOR 90 MG: 90 TABLET ORAL at 10:19

## 2023-06-02 RX ADMIN — TICAGRELOR 90 MG: 90 TABLET ORAL at 22:00

## 2023-06-02 RX ADMIN — ATORVASTATIN CALCIUM 40 MG: 40 TABLET, FILM COATED ORAL at 10:08

## 2023-06-02 NOTE — CASE MANAGEMENT
Jorge spoke with wife Jovita Mak via telephone and made her aware pt is deemed to have capacity  Jovita Mak reported she will work this weekend to ensure  may be able to meet with pt at [facility]  Jorge did discuss SNF list, Jovita Mak reported she will review list and call the facilities and make decision  Cm made her aware cm will f/u with Jovita Mak Monday to assist with SNF choices and dispo planning questions

## 2023-06-02 NOTE — CONSULTS
Consultation - Neuropsychology/Psychology Department  Rocael Garcia 58 y o  male MRN: [id number]  Unit/Bed#: -01 Encounter: [id number]        Reason for Consultation:  Rocael Garcia is a 58y o  year old male who was referred for a Neuropsychological Exam to assess cognitive functioning and comment on capacity to appoibnt Power of       History of Present Illness  Stroke    Physician Requesting Consult: Mani Martínez MD    PROBLEM LIST:  Patient Active Problem List   Diagnosis   • History of stroke   • Headache   • Primary hypertension   • GERD (gastroesophageal reflux disease)   • At risk for venous thromboembolism (VTE)   • Hypertriglyceridemia   • Nicotine dependence   • Bilateral carotid artery stenosis   • Presbyopia   • Urinary retention   • Fall   • 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 ([facility] )   • Anxiety and depression   • Insomnia   • Type 2 diabetes mellitus ([place] [address] )   • Status post placement of implantable loop recorder   • Chronic ischemic right MCA stroke   • Snoring   • Memory deficits   • Hypertensive encephalopathy, transient   • Chronic low back pain   • Middle cerebral artery stenosis, right   • Left posterior MCA stroke - etiology unclear at this time   • Chronic anticoagulation   • Stroke [facility])   • Hyponatremia   • Prediabetes   • SIRS (systemic inflammatory response syndrome) ([facility])   • Tachycardia   • Abdominal aortic aneurysm (AAA) ([facility])   • Leukocytosis   • History of tobacco use   • Chronic ischemic left MCA stroke   • Hypokalemia   • Abnormal laboratory test   • Multiple thyroid nodules   • Possible NPH (normal pressure hydrocephalus) ([facility])   • Muscle spasms of both lower extremities         Historical Information   Past Medical History:   Diagnosis Date   • Depression    • Diabetes mellitus ([place] [address] )    • Dyslipidemia 03/26/2019   • Hyperlipidemia    • Hypertension    • Stroke [facility])      Past Surgical History:   Procedure Laterality Date   • BACK SURGERY     • IR CEREBRAL ANGIOGRAPHY  5/4/2023   • IR STROKE ALERT  3/19/2019   • SHOULDER SURGERY       Social History   Social History     Substance and Sexual Activity   Alcohol Use Not Currently     Social History     Substance and Sexual Activity   Drug Use Never     Social History     Tobacco Use   Smoking Status Former   Smokeless Tobacco Never     Family History:   Family History   Problem Relation Age of Onset   • Heart attack Mother    • Diabetes Mother    • Prostate cancer Father        Meds/Allergies   current meds:   Current Facility-Administered Medications   Medication Dose Route Frequency   • acetaminophen (TYLENOL) tablet 975 mg  975 mg Oral TID PRN   • aspirin (ECOTRIN LOW STRENGTH) EC tablet 81 mg  81 mg Oral Daily   • atorvastatin (LIPITOR) tablet 40 mg  40 mg Oral Daily   • baclofen tablet 5 mg  5 mg Oral BID PRN   • bisacodyl (DULCOLAX) rectal suppository 10 mg  10 mg Rectal Daily PRN   • calcium carbonate (TUMS) chewable tablet 1,000 mg  1,000 mg Oral Daily PRN   • cilostazol (PLETAL) tablet 100 mg  100 mg Oral BID AC   • citalopram (CeleXA) tablet 20 mg  20 mg Oral Daily   • donepezil (ARICEPT) tablet 5 mg  5 mg Oral BID   • heparin (porcine) subcutaneous injection 5,000 Units  5,000 Units Subcutaneous Q8H [address]   • levofloxacin (LEVAQUIN) tablet 750 mg  750 mg Oral Q24H   • lidocaine (LIDODERM) 5 % patch 1 patch  1 patch Topical Daily   • lidocaine (URO-JET) 2 % urethral/mucosal gel   Topical Q4H PRN   • losartan (COZAAR) tablet 50 mg  50 mg Oral Daily   • menthol-methyl salicylate (BENGAY) 19-46 % cream   Apply externally 4x Daily PRN   • metoprolol succinate (TOPROL-XL) 24 hr tablet 100 mg  100 mg Oral Daily   • ondansetron (ZOFRAN-ODT) dispersible tablet 4 mg  4 mg Oral Q6H PRN   • oxyCODONE (ROXICODONE) IR tablet 5 mg  5 mg Oral Q6H PRN   • oxyCODONE (ROXICODONE) split tablet 2 5 mg  2 5 mg Oral Q6H PRN   • pantoprazole (PROTONIX) EC tablet 20 mg  20 mg Oral Early Morning   • polyethylene glycol (MIRALAX) packet 17 g  17 g Oral QAM   • senna (SENOKOT) tablet 17 2 mg  2 tablet Oral Daily PRN   • senna-docusate sodium (SENOKOT S) 8 6-50 mg per tablet 2 tablet  2 tablet Oral BID   • tamsulosin (FLOMAX) capsule 0 8 mg  0 8 mg Oral Daily With Dinner   • ticagrelor (BRILINTA) tablet 90 mg  90 mg Oral Q12H [address]   • traZODone (DESYREL) tablet 50 mg  50 mg Oral HS       Allergies   Allergen Reactions   • Flexeril [Cyclobenzaprine] Drowsiness   • Lisinopril Cough   • Nsaids Confusion         Family and Social Support:   No data recorded    Behavioral Observations: Patient was alert, cooperative and experiencing significant difficulty in the flow of articulation due to aphasia  Patient was aware of reason for hospitalization and knows what he is being treated for in hospital  He reported having prior stroke  He admitted to difficulty with recall and depression and denied anxiety  Patient became increasingly frustrated with examiner due to difficulty with retrieving words in responding to questions  However, patient appeared to understand the concept of Power of  and on three occasions clearly stated that his wife serve in this capacity  During this encounter, patient appears to have capacity to make informed decisions regarding the appointment of POA

## 2023-06-02 NOTE — PROGRESS NOTES
"   06/02/23 1230   Pain Assessment   Pain Assessment Tool 0-10   Pain Score No Pain   Restrictions/Precautions   Precautions Aphasia; Aspiration;Bed/chair alarms;Cognitive; Fall Risk; [place]; Impulsive;Supervision on toilet/commode;Visual deficit   Weight Bearing Restrictions No   ROM Restrictions No   Sit to Stand   Type of Assistance Needed Physical assistance;Verbal cues   Physical Assistance Level 25% or less   Comment Min A with gait belt and verónica walker   Sit to Stand CARE Score 3   Bed-Chair Transfer   Type of Assistance Needed Physical assistance;Verbal cues   Physical Assistance Level 26%-50%   Comment Min/mod A with gait belt and with max VCs for sequencing steps of SPT with hemiwalker   Chair/Bed-to-Chair Transfer CARE Score 3   Coordination   Gross Motor Focusing on RUE [address] [facility], pt engages in bean bag toss and hitting balloon back and forth  pt noted with under hand throwing followed by over hand throwing with increased coordination and accuracy of hand to target increased with repetition  func cog component incorporated with task, instructing pt to identify color of each bean bag prior to tossing  pt noted with 50% accuracy, 100% accuracy when provided 2 options  pt then engages in balloon toss focusing on visual attention, hand eye coordination and overall strength/endurance  pt noted with fair accuracy of hand to target during balloon toss with most difficulty noted due to ongoing visual impairments  Fine Motor pt engages in R UE [address] using resistive clothes pins focusing on visual scanning and func reaching/coordination  clothes pins placed on pt R side to promote visual scanning however significant difficulty noted attending to R side requiring frequent auditory cueing (tapping on table) as well as \"look at me, now look down\" (as this AGUILAR was placed on R side)  mild difficulty noted with manipulating clothes pins within finger tips, resorting to assist from L hand to re-position   fair hand eye coordination " noted when placing clothes pins on dowel rods  pt completed x15 with inc time  Cognition   Overall Cognitive Status Impaired   Arousal/Participation Alert; Cooperative   Attention Attends with cues to redirect   Orientation Level Oriented to person;Oriented to place   Memory Decreased short term memory;Decreased recall of recent events;Decreased recall of precautions   Following Commands Follows one step commands inconsistently   Activity Tolerance   Activity Tolerance Patient tolerated treatment well   Assessment   Treatment Assessment pt engages in 60 minute skilled OT Session focusing on repetitive STS and SPT with hemiwalker, RUE NMR and func cog/vision  see above for full func details  pt continues to demo progress toward OT Goals, completing SPT with hemiwalker at min/mod A and max VCs for sequencing steps of transfer with use of hemiwalker  (hemiwalker kept in PT gym only at this time)  pt remains motivated to return to [facility] however does demo low frustration tolerance especially with RUE NMR and func cog/vision tasks  pt remains limited by aphasia, func cog impairments, impaired RUE coordination/strength, vision, standing tolerance/balance, insight, safety, judgement, warranting continued skilled care to focus on ADL retraining, func transfers with hemiwalker, standing daxa/bal, RUE NMR, func cog/vision, in order to decrease burden of care at d/c  Prognosis Fair   Problem List Decreased strength;Decreased range of motion;Decreased endurance; Impaired balance;Decreased mobility; Decreased coordination;Decreased cognition; Impaired judgement;Decreased safety awareness; Impaired vision; Impaired sensation;Obesity;Pain   Barriers to Discharge Inaccessible home environment;Decreased caregiver support   Plan   Treatment/Interventions ADL retraining;Functional transfer training; Therapeutic exercise; Endurance training;Cognitive reorientation;Patient/family training;Equipment eval/education; Compensatory technique education   OT Therapy Minutes   OT Time In 1230   OT Time Out 1330   OT Total Time (minutes) 60   OT Mode of treatment - Individual (minutes) 60   OT Mode of treatment - Concurrent (minutes) 0   OT Mode of treatment - Group (minutes) 0   OT Mode of treatment - Co-treat (minutes) 0   OT Mode of Treatment - Total time(minutes) 60 minutes   OT Cumulative Minutes 1495   Therapy Time missed   Time missed?  No

## 2023-06-02 NOTE — PROGRESS NOTES
"Physical Medicine and Rehabilitation Progress Note  Yue Kirby 58 y o  male MRN: [id number]  Unit/Bed#: -01 Encounter: [id number]      Assessment & Plan:     Decline in ADLs and mobility: Functional assessment - improving slowly         FIM  Care Score  Admit Score Recent Score    Total assist  1-100% or 2p    Tot ADLs    Max assist 2-51-75%    Sub   LBD   Mod assist 3- 26-50%  Par  UBD, bathing    Min assist 3- 25% or < Par     CG assist 4  TA     Sup/Setup 4-5 Sup     Mod-I/Indep 6 MI      Transfers  Total assist  Mod-total assist     Ambulation   Total assist  Total assist     Stairs   Total assist/NT      Goal: CGA-supervision for most ADLs and  for mobility  Major barriers:  Impaired cognition, speech, balance, deconditioning  Dispo: SNF possibly Monday pending med stability -  would monitor patient closely after new CVA and addition of more antithrombotics; also with adjustment of ABx for possible UTI      * Stroke [facility])  Assessment & Plan  6/2 Neuro exam stable; patient feeling better emotionally and physically    - continue to monitor neuro exam, vitals closely with possible recent new stroke   6/1 Neuro exam still stable again s/p recent possible new recent CVA v evolving ischemia  Neuro f/u 6/1 - \"additional ischemia within the L parieto-occipital region consistent with the area of prior ischemia  Case was further discussed with Neuro IR as well  CTA was reviewed and stent appears patent with only mild stenosis present  Recurrent L hemispheric infarction in the setting of multiple prior anterior circulation infarcts and longstanding b/l intracranial ICA disease now s/p L ICA stenting, which is patent  This could represent evolving ischemia from his prior insult or perhaps new injury - this is unclear  Given he has failed multiple prior medical trials, feel benefit of maximizing his medical regimen as much is possible outweighs potential risk   We will add Pletal at this point to his ASA and " Mod[place] Sal  5/31 - CTA H/N - CT brain: Subacute ischemia within the left hemisphere without mass effect or taiwo hemorrhagic transformation  Advanced chronic microangiopathic change within the brain parenchyma  CT angiogram: Stable atherosclerotic disease of the right carotid bifurcation including the ICA origin and midportion of the bulb, approximately 60 to 70% at the point of maximal stenosis  A carotid stent has been placed on the left with only mild residual narrowing within the proximal internal carotid artery  Stable severe stenosis of the distal right M1 segment with decreased size and number of M2 branches  5/30 - MRI Brain - Similar areas of left parietal occipital recent ischemia/infarct  Some new areas of acute to subacute left parietal/occipital acute to subacute ischemia  Findings suggesting normal pressure hydrocephalus  5/29 - patient reported feeling off that may have started 5/28 - like he did when he had prior CVA with some worsening speech but gross neuro exam fairly stable; felt more tired   5/29 - CTH -  Slight interval progression and extension of the nonhemorrhagic acute to early subacute left cerebral infarction noted on the recent MRI study  Mild regional sulcal crowding but no significant mass effect on the underlying ventricular system or evidence of herniation  Redemonstrated disproportionate ventriculomegaly to sulcal prominence which can be seen in patients with normal pressure hydrocephalus  Background cerebral white matter changes consistent with moderate chronic microangiopathic disease      Recent multifocal ischemic infarcts in different distributions of unclear etiology   · Imaging revealed multifocal strokes; underwent L ICA PTA/stenting 5/4 also hx of R ICA stenosis   · Seen by neuro and NSx and eventually switched from his Eliquis/aspirin to aspirin and Runell Pat was felt not to be cardioembolic by neurosurgery  Stephanie Larios was a question of vasculitis as the etiology but Neurosurgery saw him in consult and felt that it was likely related to atherosclerotic and carotid disease and they stopped the Eliquis and placed back on ASA  Neuro did not feel he needed to go back on full A/C as well  · Has loop recorder placed 2019 and neuro wants OP replacement of loop recorder - OP cards f/u   · CT of the chest abdomen pelvis without evidence of malignancy done on the last admission on 4/17  · A-gram not c/w vasculitis   · Thrombosis panel done in 04/18/2023 that was only abnl for AT3 that was low in the acute setting and needs to be repeated in future  Repeat OP thrombosis panel thru neuro or hematology  · Recent IVY with no PFO  · Previous TTE 04/18/23 showed ED of 55% and nl wall motion and mildly dilated L atrium  · Brilinta, aspirin, and now Pletal 100mg BID (starting 6/2) as well as statin for secondary stroke prophylaxis   · Continue Physical therapy, Occupational Therapy, speech therapy  · Monitor neuro exam closely       Muscle spasms of both lower extremities  Assessment & Plan  Still fairly significant but not using PRN baclofen - x1 now  Has had chronically since prior CVA but significantly worse recently; strength stable; possible UTI  - Treat possible UTI  - Switched PRN Robaxin to Baclofen 5mg BID PRN - monitor cognition > if needed may need to schedule    Bilateral carotid artery stenosis  Assessment & Plan  · Status post left ICA angioplasty and stent placement neurovascular service  · Neuro c/s and f/u during ARC course - they also spoke with NSx   · Per IM- S/p L ICA PTA/stent 5/4/23;  Recheck carotid doppler 6 weeks and see NS - has OP follow-up with Dr Isidro Lemos 6/26  · Brilinta, aspirin, pletal and statin  · BP mgmt per IM here  · OP vasc sx follow-up      Memory deficits  Assessment & Plan  · Hx of multiple strokes and some cog deficits and residual aphasia/apraxia  · He does still have some aphasia and apraxia which does limit some history taking although with time basic communication is fairly adequately achieved  · Neuropsych eval 6/1  · However, patient appeared to understand the concept of Power of  and on three occasions clearly stated that his wife serve in this capacity  During this encounter, patient appears to have capacity to make informed decisions regarding the appointment of POA    · I am in agreement that in spite of patient having some cog deficits he seems to understanding and appreciate concept of POA and his needs and options regarding this and elects to choose his wife with acceptable level of capacity for this specific issue at this time  · Started on Aricept due to memory difficulties after stroke in 2019  · Currently on 5 mg twice daily  · Sees Dr Raymond Adame in outpatient neurology    Anxiety and depression  Assessment & Plan  Mood improved today   Mood down at times still - provided additional supportive counseling   Continues to sleep better, tolerating med regimen  Anxiety, depression, significant insomnia with difficulty adjusting   Neuropsych c/s 5/22  Adjustment Disorder with Anxiety and Depression  R/O Post Stroke Depression  • Supportive psychotherapy, utilizing CBT and mindfulness strategies  • DBT distress tolerance techniques  to improve coping and mood  • Meditation and relaxation training  Continue chronic home Celexa 20mg qday   Sleep impaired on lower dose Trazodone > increase back to 50mg at bedtime  - Monitor for excessive serotonin - not appreciable thus far    -Monitor for signs and symptoms of excessive serotonin  -Monitor mood, efficacy, tolerance     Urinary retention  Assessment & Plan   -Adjusted Abx to cover Enterococcus - IM recommends Levaquin 750mg qday x5 d thru 6/5 to cover complicated UTI; leukocytosis resolved   - Recommend voiding trial after additional tx for possible UTI - possibly Monday unless d/c'ing to SNF that day  - Urology c/s 5/19 for ongoing and recurrent retention as well as difficulty to place jo  - Continue flomax to 0 8mg qday  - Recent possible new CVA; still fair amount of immobility; UC showing organism not covered but recent Abx and patient with significant LE spasms   - Monitor for infection   - Optimal bowel mgmt, mobilize  - OP urology follow-up       Multiple thyroid nodules  Assessment & Plan  Incidental finding on CTA 6/1  SOFT TISSUES OF THE NECK: Several hypodense nodules are scattered within the thyroid gland  The largest of these is located on the right measuring 1 5 cm in craniocaudad dimension  This has gradually increased in size when compared with prior examination;  from 2019  Incidental discovery of one or more thyroid nodule(s) measuring more than 1 5 cm and without suspicious features is noted in this patient who is above 28years old; according to guidelines published in the February 2015 white paper on incidental thyroid nodules in the Journal of the [employer] of Radiology Izzy Arvizu), further characterization with thyroid ultrasound is recommended  - Thyroid US 6/1  FINDINGS:  Normal homogeneous smooth echotexture      Right lobe: 5 1 x 2 2 x 2 1 cm  Volume 11 5 mL  Left lobe:  4 5 x 2 1 x 1 2 cm  Volume 5 4 mL  Isthmus: 0 3  cm      Nodule #1  Image 25  RIGHT upper pole nodule measuring 1 6 x 1 0 x 1 4 cm  No priors for comparison  COMPOSITION:  2 points, solid or almost completely solid   ECHOGENICITY:  2 points, hypoechoic  SHAPE:  0 points, wider-than-tall  MARGIN: 0 points, smooth  ECHOGENIC FOCI:  0 points, none or large comet-tail artifacts  TI-RADS Classification: TR 4 (4-6 points),  FNA if > 1 5 cm  Follow if > 1cm      Nodule #2  Image 30  RIGHT lower pole nodule measuring 1 5 x 0 9 x 1 5 cm  No priors for comparison  COMPOSITION:  1 point, mixed cystic and solid  ECHOGENICITY:  1 point, hyperechoic or isoechoic  SHAPE:  0 points, wider-than-tall  MARGIN: 0 points, smooth  ECHOGENIC FOCI:  0 points, none or large comet-tail artifacts    TI-RADS Classification: TR 2 (2 points), Not suspicious  No FNA      Additional cystic nodules do not meet criteria for follow-up           IMPRESSION:  The following meet current ACR criteria for recommending ultrasound guided biopsy:  - RIGHT upper pole nodule measuring 1 6 x 1 0 x 1 4 cm  - Discussed typing of US bx and IM recommends after d/c from [facility]    Leukocytosis  Assessment & Plan  · Resolved 6/1  · Comgmt with IM   · Monitor for s/s of infection or other etiologies; monitor vitals/labs   · Starting Empiric Keflex for questionable UTI     Hypertriglyceridemia  Assessment & Plan  Continue statin    Primary hypertension  Assessment & Plan  · Adequate control  · Mgmt per IM:  metoprolol succinate 100 mg daily as well as Cozaar 50 mg daily   · Hctz stopped    GERD (gastroesophageal reflux disease)  Assessment & Plan  Continue pantoprazole and as needed Tums    At risk for venous thromboembolism (VTE)  Assessment & Plan  SCDs, ambulation, and heparin       Possible NPH (normal pressure hydrocephalus) ([facility] [address] )  Assessment & Plan  5/30 - MRI Brain - Similar areas of left parietal occipital recent ischemia/infarct  Some new areas of acute to subacute left parietal/occipital acute to subacute ischemia  Findings suggesting normal pressure hydrocephalus    Neuro recommends OP NPH clinic after d/c    Abnormal laboratory test  Assessment & Plan  AT3 89% on 4/2023 lab per prior providers; can be low from recent stroke and not true AT3 def  - Recommend follow-up with neurology and repeat thrombosis panel as an outpatient     Hypokalemia  Assessment & Plan  · Remains improved    Chronic ischemic left MCA stroke  Assessment & Plan  · Recent left MCA stroke in the beginning of April at 4/16/2023 with aphasia and was empirically treated with Eliquis 5 mg twice daily as well as aspirin 81 mg due to embolic stroke of undetermined source per prior documentation  · Despite this he presented with additional strokes for this admission > see "mgmt above     History of tobacco use  Assessment & Plan  · Patient with a history of tobacco use yet quit in 2019  · Does not need nicotine patches    Prediabetes  Assessment & Plan  · Last hemoglobin A1c of 5 8  · Mgmt per IM during ARC   · Started on consistent carbohydrate 3 diet here in addition to cardiac diet on admission to [facility]    Chronic low back pain  Assessment & Plan  · Adequate control  · APAP TID PRN - discussed with pt   · Robaxin 500mg Q6H PRN spasms   · Oxy 2 5-5mg Q6H PRN   · Continue lidocaine patch and aqua K-pad but not in the same area at the same time    Chronic ischemic right MCA stroke  Assessment & Plan  · Patient with history of right MCA stroke back in March 2019 status post thrombectomy  · Had loop recorder placement without overt arrhythmia and was on Plavix 75 mg at that time  Also with known severe right M1 stenosis  · See \"Stroke\" mgmt above         Other Medical Issues:  • Monitor for     Follow-up providers and other issues to be followed up after discharge  PCP  Neuro  Cards  Vasc Sx   NSx - NPH clinic/interventional      CODE: Level 1: Full Code    Restrictions include: Fall precautions    Objective: Allergies per EMR  Diagnostic Studies: Reviewed   US thyroid   Final Result by Ashleigh Dang MD (06/01 1421)      The following meet current ACR criteria for recommending ultrasound guided biopsy:   - RIGHT upper pole nodule measuring 1 6 x 1 0 x 1 4 cm         Reference: ACR Thyroid Imaging, Reporting and Data System (TI-RADS): White Paper of the [employer]  J AM Aram Radiol 7321;37:902-884  (additional recommendations based on American Thyroid Association 2015 guidelines )      The study was marked in [facility] for immediate notification        Workstation performed: [id number]         CTA head and neck w wo contrast   Final Result by Reynaldo Villalobos DO (05/31 9102)      CT brain: Subacute ischemia within the left hemisphere without mass effect or taiwo " "hemorrhagic transformation  Advanced chronic microangiopathic change within the brain parenchyma  CT angiogram: Stable atherosclerotic disease of the right carotid bifurcation including the ICA origin and midportion of the bulb, approximately 60 to 70% at the point of maximal stenosis  A carotid stent has been placed on the left with only mild residual narrowing within the proximal internal carotid artery  Stable severe stenosis of the distal right M1 segment with decreased size and number of M2 branches  Workstation performed: [id number]         MRI brain wo contrast   Final Result by Darryl Petersen MD (05/30 7533)      Similar areas of left parietal occipital recent ischemia/infarct  Some new areas of acute to subacute left parietal/occipital acute to subacute ischemia  Findings suggesting normal pressure hydrocephalus  Findings were marked as \"immediate\"in Epic and will now be related to the ordering physician or covering clinical team by the radiology liaison  Workstation performed: [id number]         CT head wo contrast   Final Result by Elizabeth Keith MD (05/30 8346)      Slight interval progression and extension of the nonhemorrhagic acute to early subacute left cerebral infarction noted on the recent MRI study  Mild regional sulcal crowding but no significant mass effect on the underlying ventricular system or evidence    of herniation  Redemonstrated disproportionate ventriculomegaly to sulcal prominence which can be seen in patients with normal pressure hydrocephalus  Background cerebral white matter changes consistent with moderate chronic microangiopathic disease  The study was marked in [facility] for immediate notification              Workstation performed: [id number]             See above as well    Laboratory: Labs reviewed  Results from last 7 days   Lab Units 06/01/23  0547 05/28/23  0507   HEMATOCRIT % 36 0* 36 5   HEMOGLOBIN g/dL 12 4 12 0   WBC " Thousand/uL 8 32 10 69*     Results from last 7 days   Lab Units 06/01/23  0547 05/28/23  0507   BUN mg/dL 15 19   CHLORIDE mmol/L 111* 109*   CREATININE mg/dL 0 93 1 04   POTASSIUM mmol/L 3 6 3 6            Drug regimen reviewed, all potential adverse effects identified and addressed:    Current Facility-Administered Medications   Medication Dose Route Frequency Provider Last Rate   • acetaminophen  975 mg Oral TID PRN Jonathan Vera MD     • aspirin  81 mg Oral Daily Judsergio Murrayin, DO     • atorvastatin  40 mg Oral Daily Tere Murrayin, DO     • baclofen  5 mg Oral BID PRN Jonathan Vera MD     • bisacodyl  10 mg Rectal Daily PRN Tere Murrayin, DO     • calcium carbonate  1,000 mg Oral Daily PRN Tere Murrayin, DO     • cilostazol  100 mg Oral BID AC Jonathan Vera MD     • citalopram  20 mg Oral Daily Tere Murrayin, DO     • donepezil  5 mg Oral BID Tere Murrayin, DO     • heparin (porcine)  5,000 Units Subcutaneous [facility] Tere Rob, DO     • levofloxacin  750 mg Oral Q24H Jonathan Vera MD     • lidocaine  1 patch Topical Daily Tere Rob, DO     • lidocaine   Topical Q4H PRN Jonathan Vera MD     • losartan  50 mg Oral Daily Jonathan Vera MD     • menthol-methyl salicylate   Apply externally 4x Daily PRN Ban Youssef MD     • metoprolol succinate  100 mg Oral Daily Jonathan Vera MD     • ondansetron  4 mg Oral Q6H PRN Tere Rob, DO     • oxyCODONE  5 mg Oral Q6H PRN Tere Murrayin, DO     • oxyCODONE  2 5 mg Oral Q6H PRN Tere Murrayin, DO     • pantoprazole  20 mg Oral Early Morning Judsergio Slain, DO     • polyethylene glycol  17 g Oral QAM Judsergio Murrayin, DO     • senna  2 tablet Oral Daily PRN Tere Murrayin, DO     • senna-docusate sodium  2 tablet Oral BID Tere Murrayin, DO     • tamsulosin  0 8 mg Oral Daily With [address], CRNP     • ticagrelor  90 mg Oral Q12H [address], DO     • traZODone  50 mg Oral HS Jonathan Vera, "MD         •  acetaminophen  •  baclofen  •  bisacodyl  •  calcium carbonate  •  lidocaine  •  menthol-methyl salicylate  •  ondansetron  •  oxyCODONE  •  oxyCODONE  •  senna          Chief Complaints:  Spasms     Subjective: On eval, patient reports still significant leg spasms that are quite painful at times  He reports however feeling better in therapy and mood wise  He denies worsening strength, lightheadedness, SOB, or other new complaints  ROS: A 10 point ROS was performed; negative except as noted above  Physical Exam:  Vitals:    06/02/23 1013   BP: 106/62   Pulse: 88   Resp:    Temp:    SpO2:    varicose veins    GEN:  Sitting in WC more well appearing  HEENT/NECK: MMM  CARDIAC: Regular rate rhythm, no murmers, no rubs, no gallops  LUNGS:  clear to auscultation, no wheezes, rales, or rhonchi  ABDOMEN: Soft, non-tender, non-distended, normal active bowel sounds  EXTREMITIES/SKIN:  no calf edema, no calf tenderness to palpation  NEURO:   MENTAL STATUS: Stable aphasia and interaction; able to follow simple commands again today, CN II-XII: Stable and Strength/MMT:  Unchanged  PSYCH:  Affect:  Slightly frustrated but better than yesterday    HPI:  Per admitting provider   \"Constanza Shannon is a 58 y o  male with history of prediabetes, hypertension, depression, urinary retention on Flomax, carotid stenosis, depression, prior left MCA and right MCA stroke with aphasia and memory deficits now on Aricept who presented to the [address] on 4/26 for AMS witnessed by his wife with abnormal speech that was noted to be similar to his prior strokes  Of note he was recently in the hospital earlier in the month for a left MCA stroke  He also had a right MCA stroke status post thrombectomy back in 2019  He is a prior smoker and quit at the time of his initial stroke    Additionally he had hyponatremia which was felt to be due to HCTZ use and potentially poor oral intake, chronic " "low back pain  He was seen by neurology as well as neurosurgery and eventually switched from his Eliquis/aspirin to aspirin and Brilinta  It was felt not to be cardioembolic by neurosurgery  He was also not given TNK given his recent Eliquis use when he arrived  He had imaging prior on his last admission however now with new focus of diffusion abnormality in the right frontal parietal region and in the left periatrial and parieto-occipital region  No acute hemorrhage was seen  Had a carotid Doppler showing LICA 85-42%/CLHP <26%   IVY was done and did not show any PFO or thrombus  Given that patient was on Plavix when he had his stroke on 04/16/2023, he was placed on [place]e Sasha was a question of vasculitis as the etiology but Neurosurgery saw him in consult and felt that it was likely related to atherosclerotic and carotid disease and they stopped the Eliquis and placed back on ASA   He had a cerebral arteriogram on 5/4 23 with a left ICA PTA/stent placement   There was no vasculitis noted    Per Neurology full anticoagulation did not need to be restarted  The patient was evaluated by the Rehabilitation team and deemed an appropriate candidate for comprehensive inpatient rehabilitation and admitted to the [facility] on 5/12/2023  2:35 PM\"    ** Please Note: Fluency Direct voice to text software may have been used in the creation of this document  **    I personally performed the required components and examined the patient myself in person on 6/2/23          "

## 2023-06-02 NOTE — PROGRESS NOTES
"   06/02/23 1400   Pain Assessment   Pain Assessment Tool 0-10   Pain Score No Pain   Comprehension   Comprehension (FIM) 3 - Understands basic info/conversation 50-74% of time   Expression   Expression (FIM) 3 - Expresses basic info/needs 50-74% of time   Social Interaction   Social Interaction (FIM) 4 - Interacts 75-89% of time   Problem Solving   Problem solving (FIM) 2 - Solves basic problems 25-49% of time   Memory   Memory (FIM) 2 - Recognizes, recalls/performs 25-49%   Speech/Language/Cognition Assessmetn   Treatment Assessment Pt sitting in wheelchair in his room upon SLP entering, agreeable to skilled ST session targeting expressive-receptive language  SLP initially asked pt how his day was going and engaged in some small talk to work on conversational exchange/expressive language/spontaneous speech  Pt able to successfully and intelligibly complete communicative exchange  Next, pt asked to complete his routine of expressive language tasks where he names his bio and situational info  I'ly stated his F and L name, address, month of birth, location, wife's name, one of his children's name's Marlene Yousif), 2/3 of his grandchildren's names, what happened to him, and his dog's name  With help from his script (visual cue), phonetic placement cues (PPC), initial phonemic cue, phrase completion, additional visual cues (SLP modeling), and unison performance, pt able to state his age, day and year of birth, town of hospital, daughter's name, one of his grandchildren's names, and that he's getting \"therapy\" at hospital  Next, pt attempting writing by copying upper and lower case letters  Pt unable to independently copy the letters given his involuntary control of his R hand and perseverated lines  Even with [place], pt's stiffness and involuntary gross and fine motor execution making it difficult and task was ended  Finally, pt and SLP engaging in auditory comprehension   SLP selected paragraph level as pt was demonstrating " "significant improvement in his receptive language skills since intial sessions with SLP  Pt asked to answer some f/u questions after SLP read paragraph aloud  Pt answering 2/5 i'ly increasing to 5/5 where M/C F02 items provided for one paragraph, and 1/5 i'ly, no increase given M/C options for the second paragraph questions  It is suspected the pt's impaired expressive language skills is impacting this performance, as he demonstrated some improvement with the first paragraph when the questions were not open-ended but had options, however suspect still significant auditory comprehension deficits and recommedn continue to focus in future sessions  Finally, pt engaging in constraint induced language therapy (CILT) discussing pt's job PTA  Pt with the following phrases, remarks, and responses to SLP prompts/probes: \"diesal  one time before,\" \"their needs\" \"I would have to make adjustments,\" \"for a period I did,\" \"bus drive,\" \"southern\" \"sometimes you get good students, sometimes you don't  \" With the help of RN and SLP providing semantic cues and phonemic cues, pt able to provide name of company worked and that he was an , worked in [place] and worked for a company of about 2,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  At end of conversation, SLP discussed pt's progress and reminders of how he can self-cue and how he is already beginning to do that i'ly  SLP encouraged further independence for future sessions, and with everyone he encounters at the hospital (staff), as well as when talking with family  Pt stating he is beginning to do more with his wife and he is feeling more confident in doing it  SLP then assisted pt into bed with help from RN   Pt continues to benefit from skilled language therapy and it is recommended he further receive skilled therapy to continue to optimize such skills while at [facility] to increase fcl independence as it relates to communication with family, friends, and " others  SLP Therapy Minutes   SLP Time In 1400   SLP Time Out 1500   SLP Total Time (minutes) 60   SLP Mode of treatment - Individual (minutes) 60   SLP Mode of treatment - Concurrent (minutes) 0   SLP Mode of treatment - Group (minutes) 0   SLP Mode of treatment - Co-treat (minutes) 0   SLP Mode of Treatment - Total time(minutes) 60 minutes   SLP Cumulative Minutes 5046   Therapy Time missed   Time missed?  No

## 2023-06-02 NOTE — PROGRESS NOTES
"Internal Medicine Progress Note  Patient: Yue Kirby  Age/sex: 58 y o  male  Medical Record #: [id number]      ASSESSMENT/PLAN: (Interval History)  Yue Kirby is seen and examined and management for following issues:    Acute multifocal ischemic strokes/recurrent left hemispheric infarction  • Occurred in different arterial distributions  • IVY was negative for thrombus/PFO  • Felt not to be vasculitis and negative by a-gram 5/4/23  • Continue ASA/Brilinta/statin  • [facility] 5/29 = was abnormal with possible new infarcts after he felt his speech had declined   (Dr Jamshid Finley had seen him 5/28 for this complaint and had normal exam and felt speech was at baseline)  • MRI brain 5/30 = some new areas of acute to subacute left parietal/occipital infarcts; +NPH noted  • CTH/CTA 5/31/23 = \"Subacute ischemia within the left hemisphere without mass effect or taiwo hemorrhagic transformation  Advanced chronic microangiopathic change within the brain parenchyma  Stable atherosclerotic disease of the right carotid bifurcation including the ICA origin and midportion of the bulb, approximately 60 to 70% at the point of maximal stenosis  A carotid stent has been placed on the left with only mild residual narrowing within the proximal internal carotid artery  Stable severe stenosis of the distal right M1 segment with decreased size and number of M2 branches\"    • Neurology added Pletal onto the ASA and Brilinta on 6/1/23 for recurrent left hemispheric infarction with unclear etio  • Will need to followup in NPH clinic as OP  • S/p IVF 2/2 having CTA      Left ICA stenosis  • Was noted to have all of the CVAs in last month have been in left anterior circulation  • S/p PTA/stent 5/4/23  • Plan was to recheck carotid doppler 6 weeks and see NS but 2/2 inc expresive aphasia the above workup was done  • Continue AP/statin     Right ICA stenosis  • To followup with Vasc surgery as OP  • Continue AP/statin     LOOP recorder  • Was " placed 3/2019  • Neuro wants it to be replaced = for OP to Cardiology     Leukocytosis  • Had no fevers  • CXR and Procal were normal in the hospital  • resolved     HTN  • Home:  Toprol 100mg qd/Norvasc 10mg qd/Losartan 50mg qd/HCTZ 25mg qd/Hydralazine 25mg TID  • Here:  Toprol 100mg qd/Losartan 50mg qd  • stable     Pre DM  • HbA1C 5 8  • Takes Metformin at home but currently not on it in hospital  • Accuchecks were stable and dc'd in the hospital  • Monitor FBS with BMPs and continue DM diet  • FBS 92 on 6/1/23     Depression  • Continue Celexa as at home     Memory loss  • Continue Aricept as at home  • He has had memory issues since his CVA in 2019     Urine retention  • Has jo placed 5/8/23  • VT 5/18 failed and jo replaced by Urology 5/19/23 and is not to be removed  • Continue Flomax     Chronic LBP  • MRI showed advanced multilevel spondylosis, slightly progressed on the right at L2-3 compared to the prior study but otherwise stable  • Pain management per PMR     AAA  • Found on L-spine MRI  • Measures 3 4 cm  • OP followup     Hyponatremia  • Stopped HCTZ 5/16/23  • Resolved off of HCTZ and had been given IVF     Leg cramps  • Per PMR     UTI  • cx = >100,000 Enterococcus faecalis and 20,000-29,000 mixed contaminants x 2  • Has had no fever   • Dr Kasia Maldonado started Keflex 5/30 --> switched to Levaquin 750mg qd x 5 on 6/1 based on sensitivities = d/w Dr Moy Coyne     Thyroid nodules  • Found on CTA H/N  • Thyroid U/S 6/1/23 = right upper pole nodule 1 6 x 1 0 x 1 4 cm --> bx was recommended        Discharge date:  pending subacute rehab    The above assessment and plan was reviewed and updated as determined by my evaluation of the patient on 6/2/2023      Labs:   Results from last 7 days   Lab Units 06/01/23  0547 05/28/23  0507   HEMATOCRIT % 36 0* 36 5   HEMOGLOBIN g/dL 12 4 12 0   PLATELETS Thousands/uL 252 277   WBC Thousand/uL 8 32 10 69*     Results from last 7 days   Lab Units 06/01/23  0547 05/28/23  0507   BUN mg/dL 15 19   CALCIUM mg/dL 9 1 9 1   CHLORIDE mmol/L 111* 109*   CO2 mmol/L 24 26   CREATININE mg/dL 0 93 1 04   POTASSIUM mmol/L 3 6 3 6   SODIUM mmol/L 138 138                   Review of Scheduled Meds:  Current Facility-Administered Medications   Medication Dose Route Frequency Provider Last Rate   • acetaminophen  975 mg Oral TID PRN Osiel Anderson MD     • aspirin  81 mg Oral Daily Yanira Sawyer DO     • atorvastatin  40 mg Oral Daily Yanira Sawyer DO     • baclofen  5 mg Oral BID PRN Osiel Anderson MD     • bisacodyl  10 mg Rectal Daily PRN Yanira Sawyer, DO     • calcium carbonate  1,000 mg Oral Daily PRN Yanira Sawyer DO     • cilostazol  100 mg Oral BID AC Osiel Anderson MD     • citalopram  20 mg Oral Daily Yanira Sawyer DO     • donepezil  5 mg Oral BID Yanira Sawyer DO     • heparin (porcine)  5,000 Units [facility] DO     • levofloxacin  750 mg Oral Q24H Osiel Anderson MD     • lidocaine  1 patch Topical Daily Yanira Sawyer DO     • lidocaine   Topical Q4H PRN Osiel Anderson MD     • losartan  50 mg Oral Daily Osiel Anderson MD     • menthol-methyl salicylate   Apply externally 4x Daily PRN Ruchi Irwin MD     • metoprolol succinate  100 mg Oral Daily Osiel Anderson MD     • ondansetron  4 mg Oral Q6H PRN Yanira Sawyer DO     • oxyCODONE  5 mg Oral Q6H PRN Yanira Sawyer DO     • oxyCODONE  2 5 mg Oral Q6H PRN Yanira Sawyer DO     • pantoprazole  20 mg Oral Early Morning Yanira Sawyer DO     • polyethylene glycol  17 g Oral QAM Yanira Sawyer DO     • senna  2 tablet Oral Daily PRN Yanira Sawyer DO     • senna-docusate sodium  2 tablet Oral BID Yanira Sawyer DO     • tamsulosin  0 8 mg Oral Daily With [address], CRNP     • ticagrelor  90 mg Oral Q12H [facility] Yanira Sawyer DO     • traZODone  50 mg Oral HS Osiel Anderson MD         Subjective/ HPI: Patient seen and examined  "Patients overnight issues or events were reviewed with nursing or staff during rounds or morning huddle session  New or overnight issues include the following:     No new or overnight issues  Offers no complaints    ROS:   A 10 point ROS was performed; negative except as noted above  Imaging:     US thyroid   Final Result by Mariaa Kerr MD (06/01 1421)      The following meet current ACR criteria for recommending ultrasound guided biopsy:   - RIGHT upper pole nodule measuring 1 6 x 1 0 x 1 4 cm         Reference: ACR Thyroid Imaging, Reporting and Data System (TI-RADS): White Paper of the [employer]  J AM Aram Radiol 4929;41:[id number]  (additional recommendations based on American Thyroid Association 2015 guidelines )      The study was marked in [facility] for immediate notification  Workstation performed: [id number]         CTA head and neck w wo contrast   Final Result by Reynaldo Herbert DO (05/31 0901)      CT brain: Subacute ischemia within the left hemisphere without mass effect or taiwo hemorrhagic transformation  Advanced chronic microangiopathic change within the brain parenchyma  CT angiogram: Stable atherosclerotic disease of the right carotid bifurcation including the ICA origin and midportion of the bulb, approximately 60 to 70% at the point of maximal stenosis  A carotid stent has been placed on the left with only mild residual narrowing within the proximal internal carotid artery  Stable severe stenosis of the distal right M1 segment with decreased size and number of M2 branches  Workstation performed: [id number]         MRI brain wo contrast   Final Result by Trina Wilkerson MD (05/30 0361)      Similar areas of left parietal occipital recent ischemia/infarct  Some new areas of acute to subacute left parietal/occipital acute to subacute ischemia  Findings suggesting normal pressure hydrocephalus        Findings were marked as \"immediate\"in " Epic and will now be related to the ordering physician or covering clinical team by the radiology liaison  Workstation performed: [id number]         CT head wo contrast   Final Result by Candace Snow MD (05/30 2193)      Slight interval progression and extension of the nonhemorrhagic acute to early subacute left cerebral infarction noted on the recent MRI study  Mild regional sulcal crowding but no significant mass effect on the underlying ventricular system or evidence    of herniation  Redemonstrated disproportionate ventriculomegaly to sulcal prominence which can be seen in patients with normal pressure hydrocephalus  Background cerebral white matter changes consistent with moderate chronic microangiopathic disease  The study was marked in [facility] for immediate notification              Workstation performed: [id number]             *Labs /Radiology studies reviewed  *Medications reviewed and reconciled as needed  *Please refer to order section for additional ordered labs studies  *Case discussed with primary attending during morning huddle case rounds    Physical Examination:  Vitals:   Vitals:    06/01/23 1340 06/01/23 2032 06/02/23 0507 06/02/23 1013   BP: 138/62 166/67 134/60 106/62   BP Location: Right arm Right arm Right arm    Pulse: 66 73 66 88   Resp: 18 18 19    Temp: 98 6 °F (37 °C) (!) 97 4 °F (36 3 °C) 98 1 °F (36 7 °C)    TempSrc: Oral Oral Oral    SpO2: 98% 94% 95%    Weight:           General Appearance: no distress, conversive  HEENT: PERRLA, conjuctiva normal; oropharynx clear; mucous membranes moist   Neck:  Supple, normal ROM  Lungs: CTA, normal respiratory effort, no retractions, expiratory effort normal  CV: regular rate and rhythm; no rubs/murmurs/gallops, PMI normal   ABD: soft; ND/NT; +BS  EXT: no edema  Skin: normal turgor, normal texture, no rashes  Psych: affect normal, mood normal  Neuro: AA      The above physical exam was reviewed and updated as determined by my evaluation of the patient on 6/2/2023  Invasive Devices     Peripheral Intravenous Line  Duration           Peripheral IV 05/31/23 Left;Upper Arm 1 day          Drain  Duration           Urethral Catheter Coude 16 Fr  13 days                   VTE Pharmacologic Prophylaxis: Heparin  Code Status: Level 1 - Full Code  Current Length of Stay: 21 day(s)      Total time spent:  30 minutes with more than 50% spent counseling/coordinating care  Counseling includes discussion with patient re: progress  and discussion with patient of his/her current medical state/information  Coordination of patient's care was performed in conjunction with primary service  Time invested included review of patient's labs, vitals, and management of their comorbidities with continued monitoring  In addition, this patient was discussed with medical team including physician and advanced extenders  The care of the patient was extensively discussed and appropriate treatment plan was formulated unique for this patient  Medical decision making for the day was made by supervising physician unless otherwise noted in their attestation statement  ** Please Note:  voice to text software may have been used in the creation of this document   Although proof errors in transcription or interpretation are a potential of such software**

## 2023-06-02 NOTE — PROGRESS NOTES
06/02/23 0830   Pain Assessment   Pain Assessment Tool 0-10   Pain Score No Pain   Restrictions/Precautions   Precautions Aphasia; Aspiration;Bed/chair alarms;Cognitive; Fall Risk; [place]; Impulsive;Supervision on toilet/commode;Visual deficit   Weight Bearing Restrictions No   ROM Restrictions No   Cognition   Overall Cognitive Status Impaired   Arousal/Participation Alert; Cooperative   Attention Attends with cues to redirect   Orientation Level Oriented to person;Oriented to place   Memory Decreased recall of precautions;Decreased recall of recent events;Decreased short term memory   Following Commands Follows one step commands inconsistently   Lying to Sitting on Side of Bed   Type of Assistance Needed Supervision; Adaptive equipment; Incidental touching   Comment CS/CGA with MAX VC's for safety  Lying to Sitting on Side of Bed CARE Score 4   Sit to Stand   Type of Assistance Needed Physical assistance;Verbal cues   Physical Assistance Level 25% or less   Comment NATHALIE to    Sit to Stand CARE Score 3   Bed-Chair Transfer   Type of Assistance Needed Physical assistance;Verbal cues; Adaptive equipment   Physical Assistance Level 26%-50%   Comment Stand pivots no AD and with HW   Chair/Bed-to-Chair Transfer CARE Score 3   Transfer Bed/Chair/Wheelchair   Adaptive Equipment Hand Hold;George Walker   Car Transfer   Comment (S)  trail next session with HW   Reason if not Attempted Safety concerns   Car Transfer CARE Score 88   Walk 10 Feet   Type of Assistance Needed Physical assistance;Verbal cues; Adaptive equipment   Physical Assistance Level Total assistance   Comment HHA x1 on L with R sided A for weight shifting, HW with WC follow   Walk 10 Feet CARE Score 1   Walk 50 Feet with Two Turns   Type of Assistance Needed Physical assistance;Verbal cues; Adaptive equipment   Physical Assistance Level Total assistance   Comment HHA x1 on L with R sided A for weight shifting, HW with WC follow   Walk 50 Feet with Two Turns CARE Score 1   Walk 150 Feet   Type of Assistance Needed Physical assistance;Verbal cues; Adaptive equipment   Physical Assistance Level Total assistance   Comment HHA x1 on L with R sided A for weight shifting with WC follow   Walk 150 Feet CARE Score 1   Walking 10 Feet on Uneven Surfaces   Reason if not Attempted Safety concerns   Walking 10 Feet on Uneven Surfaces CARE Score 88   Ambulation   Primary Mode of Locomotion Prior to Admission Walk   Distance Walked (feet) 100 ft  (75' x2, 61' x2)   Assist Device George Walker;Hand Hold   Gait Pattern Ataxic; Inconsistant Deepti; Slow Deepti;Decreased foot clearance;R foot drag; Forward Flexion;R hemiparesis; Narrow NEREIDA;Scissoring; Step to;Decreased R stance; Improper weight shift; Lateral deviation  (heavy R lean)   Limitations Noted In Balance; Coordination;Device Management; Endurance; Heel Strike;Neglect; Midline Orientation; Safety;Posture;Speed;Strength;Swing;Sequencing   Provided Assistance with: Balance;Weight Shift   Walk Assist Level Moderate Assist;Chair Follow   Findings noted improved weight shift to L with no HHA on R but A at gait belt for weight shift and second person on L HHA  R AFO and 4# to BLE  With HW pt varied MIN/MODAA x2 to [place] 62 x2  MAX VC's for sequencing required but by end of sessio pt able to amb short distances MODA x1 with HW, LAFO and 4# on HW and RLE onkly for added support/feeback  Does the patient walk? 2  Yes   Curb or Single Stair   Reason if not Attempted Safety concerns   1 Step (Curb) CARE Score 88   4 Steps   Reason if not Attempted Safety concerns   4 Steps CARE Score 88   12 Steps   Reason if not Attempted Safety concerns   12 Steps CARE Score 88   Picking Up Object   Reason if not Attempted Safety concerns   Picking Up Object CARE Score 88   Therapeutic Interventions   Neuromuscular Re-Education HHA x1 on L with R sided A for weight shifting 100', 75' x2, LAFO and 4# BLE  Noted improved wwight shift to L with decreased feedback on R side  MAX VC's to lean L and amb with wide step on R  Equipment Use   NuStep L3 x10 min   Assessment   Treatment Assessment Pt participated in skilled PT session with increased focus on gait with LRAD  Pt noted improved L lateral lean to L with use of verónica walker varying from MODA x1 for short distance as pt has retropulsion during sit pivot and MIN/CGA x2 for safety  MAX VC's are needed for proper sequencing at this time, pt attempts to amb faster which confuses him  Pt would cont to benefit from ongoing trials of transfers and gait with HW to determine if he will be able to carryover safely  Cont POC as tolerated  Problem List Decreased strength;Decreased range of motion;Decreased endurance; Impaired balance;Decreased mobility; Decreased coordination;Decreased cognition; Impaired judgement;Decreased safety awareness; Impaired vision; Impaired sensation;Pain   Barriers to Discharge Decreased caregiver support; Inaccessible home environment   PT Barriers   Functional Limitation Car transfers;Stair negotiation;Standing;Transfers; Walking; Wheelchair management   Plan   Treatment/Interventions Functional transfer training;LE strengthening/ROM; Therapeutic exercise; Endurance training;Cognitive reorientation;Patient/family training;Gait training;Bed mobility   Progress Progressing toward goals   Recommendation   PT Discharge Recommendation   (TBD)   Equipment Recommended   (TBD)   PT Therapy Minutes   PT Time In 0830   PT Time Out 1000   PT Total Time (minutes) 90   PT Mode of treatment - Individual (minutes) 90   PT Mode of treatment - Concurrent (minutes) 0   PT Mode of treatment - Group (minutes) 0   PT Mode of treatment - Co-treat (minutes) 0   PT Mode of Treatment - Total time(minutes) 90 minutes   PT Cumulative Minutes 1338   Therapy Time missed   Time missed?  No

## 2023-06-02 NOTE — PROGRESS NOTES
"Physical Medicine and Rehabilitation Progress Note  Kary Kruger 58 y o  male MRN: [id number]  Unit/Bed#: -01 Encounter: [id number]      Assessment & Plan:     Decline in ADLs and mobility: Functional assessment - improving some        FIM  Care Score  Admit Score Recent Score    Total assist  1-100% or 2p    Tot ADLs    Max assist 2-51-75%    Sub   LBD   Mod assist 3- 26-50%  Par  UBD, bathing    Min assist 3- 25% or < Par     CG assist 4  TA     Sup/Setup 4-5 Sup     Mod-I/Indep 6 MI      Transfers  Total assist  Mod assist     Ambulation   Total assist  Total assist     Stairs   Total assist/NT      Goal: CGA-supervision for most ADLs and  for mobility  Major barriers:  Impaired cognition, speech, balance, deconditioning  Dispo: SNF possibly Monday pending med stability -  would monitor patient closely after new CVA and addition of more antithrombotics; also with adjustment of ABx for possible UTI      * Stroke [facility])  Assessment & Plan  6/1 Neuro exam still stable again s/p recent possible new recent CVA v evolving ischemia - continue to monitor neuro exam, vitals closely   Neuro f/u 6/1 - \"additional ischemia within the L parieto-occipital region consistent with the area of prior ischemia  Case was further discussed with Neuro IR as well  CTA was reviewed and stent appears patent with only mild stenosis present  Recurrent L hemispheric infarction in the setting of multiple prior anterior circulation infarcts and longstanding b/l intracranial ICA disease now s/p L ICA stenting, which is patent  This could represent evolving ischemia from his prior insult or perhaps new injury - this is unclear  Given he has failed multiple prior medical trials, feel benefit of maximizing his medical regimen as much is possible outweighs potential risk  We will add Pletal at this point to his ASA and Brillinta    5/31 - CTA H/N - CT brain: Subacute ischemia within the left hemisphere without mass effect or taiwo " hemorrhagic transformation  Advanced chronic microangiopathic change within the brain parenchyma  CT angiogram: Stable atherosclerotic disease of the right carotid bifurcation including the ICA origin and midportion of the bulb, approximately 60 to 70% at the point of maximal stenosis  A carotid stent has been placed on the left with only mild residual narrowing within the proximal internal carotid artery  Stable severe stenosis of the distal right M1 segment with decreased size and number of M2 branches  5/30 - MRI Brain - Similar areas of left parietal occipital recent ischemia/infarct  Some new areas of acute to subacute left parietal/occipital acute to subacute ischemia  Findings suggesting normal pressure hydrocephalus  5/29 - patient reported feeling off that may have started 5/28 - like he did when he had prior CVA with some worsening speech but gross neuro exam fairly stable; felt more tired   5/29 - CTH -  Slight interval progression and extension of the nonhemorrhagic acute to early subacute left cerebral infarction noted on the recent MRI study  Mild regional sulcal crowding but no significant mass effect on the underlying ventricular system or evidence of herniation  Redemonstrated disproportionate ventriculomegaly to sulcal prominence which can be seen in patients with normal pressure hydrocephalus  Background cerebral white matter changes consistent with moderate chronic microangiopathic disease      Recent multifocal ischemic infarcts in different distributions of unclear etiology   · Imaging revealed multifocal strokes; underwent L ICA PTA/stenting 5/4 also hx of R ICA stenosis   · Seen by neuro and NSx and eventually switched from his Eliquis/aspirin to aspirin and Raphael Boron was felt not to be cardioembolic by neurosurgery  Paulette Cluster was a question of vasculitis as the etiology but Neurosurgery saw him in consult and felt that it was likely related to atherosclerotic and carotid disease and they stopped the Eliquis and placed back on ASA  Neuro did not feel he needed to go back on full A/C as well  · Has loop recorder placed 2019 and neuro wants OP replacement of loop recorder - OP cards f/u   · CT of the chest abdomen pelvis without evidence of malignancy done on the last admission on 4/17  · A-gram not c/w vasculitis   · Thrombosis panel done in 04/18/2023 that was only abnl for AT3 that was low in the acute setting and needs to be repeated in future  Repeat OP thrombosis panel thru neuro or hematology  · Recent IVY with no PFO  · Previous TTE 04/18/23 showed ED of 55% and nl wall motion and mildly dilated L atrium  · Brilinta, aspirin, and now Pletal 100mg BID (starting 6/2) as well as statin for secondary stroke prophylaxis   · Continue Physical therapy, Occupational Therapy, speech therapy  · Monitor neuro exam closely       Bilateral carotid artery stenosis  Assessment & Plan  · Status post left ICA angioplasty and stent placement neurovascular service  · Neuro c/s and f/u during ARC course - they also spoke with NSx   · Per IM- S/p L ICA PTA/stent 5/4/23; Recheck carotid doppler 6 weeks and see NS - has OP follow-up with Dr Fan Avila 6/26  · Brilinta, aspirin, pletal and statin  · BP mgmt per IM here  · OP vasc sx follow-up      Memory deficits  Assessment & Plan  · Hx of multiple strokes and some cog deficits and residual aphasia/apraxia  · He does still have some aphasia and apraxia which does limit some history taking although with time basic communication is fairly adequately achieved    · Patient/wife would like to have patient sign POA to her this week with  - he may still have adequate MDM capacity for this in spite of some deficits - would recommend neuropsych c/s to confirm > pending - should be here around 3pm  · Started on Aricept due to memory difficulties after stroke in 2019  · Currently on 5 mg twice daily  · Sees Dr Lashaun Meyers in outpatient neurology    Anxiety and depression  Assessment & Plan  Mood a little better today   Mood down at times still - provided additional supportive counseling   Continues to sleep better, tolerating med regimen  Anxiety, depression, significant insomnia with difficulty adjusting   Neuropsych c/s 5/22  Adjustment Disorder with Anxiety and Depression  R/O Post Stroke Depression  • Supportive psychotherapy, utilizing CBT and mindfulness strategies  • DBT distress tolerance techniques  to improve coping and mood  • Meditation and relaxation training  Continue chronic home Celexa 20mg qday   Sleep impaired on lower dose Trazodone > increase back to 50mg at bedtime  - Monitor for excessive serotonin - not appreciable thus far    -Monitor for signs and symptoms of excessive serotonin  -Monitor mood, efficacy, tolerance     Urinary retention  Assessment & Plan  - Urology c/s 5/19 for ongoing and recurrent retention as well as difficulty to place jo  - Recommend voiding trial after additional tx for possible UTI   - Continue flomax to 0 8mg qday  - Recent possible new CVA; still fair amount of immobility; UC showing organism not covered but recent Abx and patient with significant LE spasms    -Recommend adjusting Abx to cover - IM recommends Levaquin 750mg qday x5 d thru 6/5 to cover complicated UTI  - Monitor for infection   - Optimal bowel mgmt, mobilize  - OP urology follow-up       Multiple thyroid nodules  Assessment & Plan  Incidental finding on CTA 6/1  SOFT TISSUES OF THE NECK: Several hypodense nodules are scattered within the thyroid gland  The largest of these is located on the right measuring 1 5 cm in craniocaudad dimension  This has gradually increased in size when compared with prior examination;  from 2019    Incidental discovery of one or more thyroid nodule(s) measuring more than 1 5 cm and without suspicious features is noted in this patient who is above 28years old; according to guidelines published in the February 2015 white paper on incidental thyroid nodules in the Journal of the [employer] of Radiology [employer], further characterization with thyroid ultrasound is recommended  - Thyroid US 6/1  FINDINGS:  Normal homogeneous smooth echotexture      Right lobe: 5 1 x 2 2 x 2 1 cm  Volume 11 5 mL  Left lobe:  4 5 x 2 1 x 1 2 cm  Volume 5 4 mL  Isthmus: 0 3  cm      Nodule #1  Image 25  RIGHT upper pole nodule measuring 1 6 x 1 0 x 1 4 cm  No priors for comparison  COMPOSITION:  2 points, solid or almost completely solid   ECHOGENICITY:  2 points, hypoechoic  SHAPE:  0 points, wider-than-tall  MARGIN: 0 points, smooth  ECHOGENIC FOCI:  0 points, none or large comet-tail artifacts  TI-RADS Classification: TR 4 (4-6 points),  FNA if > 1 5 cm  Follow if > 1cm      Nodule #2  Image 30  RIGHT lower pole nodule measuring 1 5 x 0 9 x 1 5 cm  No priors for comparison  COMPOSITION:  1 point, mixed cystic and solid  ECHOGENICITY:  1 point, hyperechoic or isoechoic  SHAPE:  0 points, wider-than-tall  MARGIN: 0 points, smooth  ECHOGENIC FOCI:  0 points, none or large comet-tail artifacts  TI-RADS Classification: TR 2 (2 points), Not suspicious  No FNA      Additional cystic nodules do not meet criteria for follow-up             IMPRESSION:  The following meet current ACR criteria for recommending ultrasound guided biopsy:  - RIGHT upper pole nodule measuring 1 6 x 1 0 x 1 4 cm  - Discussed typing of US bx and IM recommends after d/c from [facility]    Leukocytosis  Assessment & Plan  · WBC 12 9>11 9>10 69 on 5/29   · Comgmt with IM   · Monitor for s/s of infection or other etiologies; monitor vitals/labs   · Starting Empiric Keflex for questionable UTI     Hypertriglyceridemia  Assessment & Plan  Continue statin    Primary hypertension  Assessment & Plan  · Adequate control  · Mgmt per IM:  metoprolol succinate 100 mg daily as well as Cozaar 50 mg daily   · Hctz stopped    GERD (gastroesophageal reflux disease)  Assessment & Plan  Continue pantoprazole and as needed Tums    At risk for venous thromboembolism (VTE)  Assessment & Plan  SCDs, ambulation, and heparin       Muscle spasms of both lower extremities  Assessment & Plan  Has had chronically since prior CVA but significantly worse recently; strength stable; possible UTI  - Treat possible UTI  - Switch PRN Robaxin to Baclofen 5mg BID PRN - monitor cognition     Possible NPH (normal pressure hydrocephalus) (HCC)  Assessment & Plan  5/30 - MRI Brain - Similar areas of left parietal occipital recent ischemia/infarct  Some new areas of acute to subacute left parietal/occipital acute to subacute ischemia  Findings suggesting normal pressure hydrocephalus    Neuro recommends OP NPH clinic after d/c    Abnormal laboratory test  Assessment & Plan  AT3 89% on 4/2023 lab per prior providers; can be low from recent stroke and not true AT3 def  - Recommend follow-up with neurology and repeat thrombosis panel as an outpatient     Hypokalemia  Assessment & Plan  · Remains improved    Chronic ischemic left MCA stroke  Assessment & Plan  · Recent left MCA stroke in the beginning of April at 4/16/2023 with aphasia and was empirically treated with Eliquis 5 mg twice daily as well as aspirin 81 mg due to embolic stroke of undetermined source per prior documentation  · Despite this he presented with additional strokes for this admission > see mgmt above     History of tobacco use  Assessment & Plan  · Patient with a history of tobacco use yet quit in 2019  · Does not need nicotine patches    Prediabetes  Assessment & Plan  · Last hemoglobin A1c of 5 8  · Mgmt per IM during ARC   · Started on consistent carbohydrate 3 diet here in addition to cardiac diet on admission to [facility]    Chronic low back pain  Assessment & Plan  · Adequate control  · APAP TID PRN - discussed with pt   · Robaxin 500mg Q6H PRN spasms   · Oxy 2 5-5mg Q6H PRN   · Continue lidocaine patch and aqua K-pad but not in the same area at the same "time    Chronic ischemic right MCA stroke  Assessment & Plan  · Patient with history of right MCA stroke back in March 2019 status post thrombectomy  · Had loop recorder placement without overt arrhythmia and was on Plavix 75 mg at that time  Also with known severe right M1 stenosis  · See \"Stroke\" mgmt above         Other Medical Issues:  • Monitor for     Follow-up providers and other issues to be followed up after discharge  PCP  Neuro  Cards  Vasc Sx   NSx - NPH clinic/interventional      CODE: Level 1: Full Code    Restrictions include: Fall precautions    Objective: Allergies per EMR  Diagnostic Studies: Reviewed   US thyroid   Final Result by Dorn Saint, MD (06/01 1421)      The following meet current ACR criteria for recommending ultrasound guided biopsy:   - RIGHT upper pole nodule measuring 1 6 x 1 0 x 1 4 cm         Reference: ACR Thyroid Imaging, Reporting and Data System (TI-RADS): White Paper of the [employer]  J AM Aram Radiol 0289;17:036-486  (additional recommendations based on American Thyroid Association 2015 guidelines )      The study was marked in [facility] for immediate notification  Workstation performed: [id number]         CTA head and neck w wo contrast   Final Result by Reynaldo Fitzgerald DO (05/31 1641)      CT brain: Subacute ischemia within the left hemisphere without mass effect or taiwo hemorrhagic transformation  Advanced chronic microangiopathic change within the brain parenchyma  CT angiogram: Stable atherosclerotic disease of the right carotid bifurcation including the ICA origin and midportion of the bulb, approximately 60 to 70% at the point of maximal stenosis  A carotid stent has been placed on the left with only mild residual narrowing within the proximal internal carotid artery  Stable severe stenosis of the distal right M1 segment with decreased size and number of M2 branches        Workstation performed: [id number]       " "  MRI brain wo contrast   Final Result by Last Vanegas MD (05/30 4045)      Similar areas of left parietal occipital recent ischemia/infarct  Some new areas of acute to subacute left parietal/occipital acute to subacute ischemia  Findings suggesting normal pressure hydrocephalus  Findings were marked as \"immediate\"in Epic and will now be related to the ordering physician or covering clinical team by the radiology liaison  Workstation performed: [id number]         CT head wo contrast   Final Result by Jose Rothman MD (05/30 5756)      Slight interval progression and extension of the nonhemorrhagic acute to early subacute left cerebral infarction noted on the recent MRI study  Mild regional sulcal crowding but no significant mass effect on the underlying ventricular system or evidence    of herniation  Redemonstrated disproportionate ventriculomegaly to sulcal prominence which can be seen in patients with normal pressure hydrocephalus  Background cerebral white matter changes consistent with moderate chronic microangiopathic disease  The study was marked in [facility] for immediate notification              Workstation performed: [id number]             See above as well    Laboratory: Labs reviewed  Results from last 7 days   Lab Units 06/01/23  0547 05/28/23  0507   HEMATOCRIT % 36 0* 36 5   HEMOGLOBIN g/dL 12 4 12 0   WBC Thousand/uL 8 32 10 69*     Results from last 7 days   Lab Units 06/01/23  0547 05/28/23  0507   BUN mg/dL 15 19   CHLORIDE mmol/L 111* 109*   CREATININE mg/dL 0 93 1 04   POTASSIUM mmol/L 3 6 3 6            Drug regimen reviewed, all potential adverse effects identified and addressed:    Current Facility-Administered Medications   Medication Dose Route Frequency Provider Last Rate   • acetaminophen  975 mg Oral TID PRN Val Winters MD     • aspirin  81 mg Oral Daily Shade Roach DO     • atorvastatin  40 mg Oral Daily Shade Roach DO     • baclofen  5 mg Oral BID " PRN Cate Cronin MD     • bisacodyl  10 mg Rectal Daily PRN Rollo Amass, DO     • calcium carbonate  1,000 mg Oral Daily PRN Rollo Amass, DO     • cilostazol  100 mg Oral BID AC Cate Cronin MD     • citalopram  20 mg Oral Daily Rollo Amass, DO     • donepezil  5 mg Oral BID Rollo Amass, DO     • heparin (porcine)  5,000 Units Subcutaneous [facility] Rollo Amass, DO     • levofloxacin  750 mg Oral Q24H Cate Cronin MD     • lidocaine  1 patch Topical Daily Rollo Amass, DO     • lidocaine   Topical Q4H PRN Cate Cronin MD     • losartan  50 mg Oral Daily Cate Cronin MD     • menthol-methyl salicylate   Apply externally 4x Daily PRN Kiera Haro MD     • metoprolol succinate  100 mg Oral Daily Cate Cronin MD     • ondansetron  4 mg Oral Q6H PRN Rollo Amass, DO     • oxyCODONE  5 mg Oral Q6H PRN Rollo Amass, DO     • oxyCODONE  2 5 mg Oral Q6H PRN Rollo Amass, DO     • pantoprazole  20 mg Oral Early Morning Rollo Amass, DO     • polyethylene glycol  17 g Oral QAM Rollo Amass, DO     • senna  2 tablet Oral Daily PRN Rollo Amass, DO     • senna-docusate sodium  2 tablet Oral BID Rollo Amass, DO     • tamsulosin  0 8 mg Oral Daily With Katia Hair, CRNP     • ticagrelor  90 mg Oral Q12H [facility] Rollo Amass, DO     • traZODone  50 mg Oral HS Cate Cronin MD         •  acetaminophen  •  baclofen  •  bisacodyl  •  calcium carbonate  •  lidocaine  •  menthol-methyl salicylate  •  ondansetron  •  oxyCODONE  •  oxyCODONE  •  senna          Chief Complaints:  Spasms     Subjective: On eval, patient reports strength and sensation stable as is speech  He denies lightheadedness  He reports severe BLE leg spasms worse than his baseline and can impact sleep  He denies fever,chills, sweats, SOB, or other new complaints  ROS: A 10 point ROS was performed; negative except as noted above            Physical Exam:  06/01/23 0530 "97 7 °F (36 5 °C) 69 18 138/64 -- 98 % None (Room air)      Vitals above reviewed on date of encounter    GEN:  Lyng in bed somewhat uncomfortable  HEENT/NECK: MMM  CARDIAC: Regular rate rhythm, no murmers, no rubs, no gallops  LUNGS:  clear to auscultation, no wheezes, rales, or rhonchi  ABDOMEN: Soft, non-tender, non-distended, normal active bowel sounds  EXTREMITIES/SKIN:  no calf edema, no calf tenderness to palpation  NEURO:   MENTAL STATUS: Stable aphasia and interaction; able to follow simple commands, CN II-XII: Stable and Strength/MMT:  Near full with perhaps trace R sided weakness - stable  PSYCH:  Affect:  Slightly frustrated    HPI:  Per admitting provider   \"Constanza Blake is a 58 y o  male with history of prediabetes, hypertension, depression, urinary retention on Flomax, carotid stenosis, depression, prior left MCA and right MCA stroke with aphasia and memory deficits now on Aricept who presented to the [facility] [facility] [address] on 4/26 for AMS witnessed by his wife with abnormal speech that was noted to be similar to his prior strokes  Of note he was recently in the hospital earlier in the month for a left MCA stroke  He also had a right MCA stroke status post thrombectomy back in 2019  He is a prior smoker and quit at the time of his initial stroke  Additionally he had hyponatremia which was felt to be due to HCTZ use and potentially poor oral intake, chronic low back pain  He was seen by neurology as well as neurosurgery and eventually switched from his Eliquis/aspirin to aspirin and Brilinta  It was felt not to be cardioembolic by neurosurgery  He was also not given TNK given his recent Eliquis use when he arrived  He had imaging prior on his last admission however now with new focus of diffusion abnormality in the right frontal parietal region and in the left periatrial and parieto-occipital region  No acute hemorrhage was seen    Had a carotid Doppler showing LICA 84-96%/EJUE " "<50%   IVY was done and did not show any PFO or thrombus  Given that patient was on Plavix when he had his stroke on 04/16/2023, he was placed on [place] was a question of vasculitis as the etiology but Neurosurgery saw him in consult and felt that it was likely related to atherosclerotic and carotid disease and they stopped the Eliquis and placed back on ASA   He had a cerebral arteriogram on 5/4 23 with a left ICA PTA/stent placement   There was no vasculitis noted    Per Neurology full anticoagulation did not need to be restarted  The patient was evaluated by the Rehabilitation team and deemed an appropriate candidate for comprehensive inpatient rehabilitation and admitted to the [facility] on 5/12/2023  2:35 PM\"    ** Please Note: Fluency Direct voice to text software may have been used in the creation of this document  **    I personally performed the required components and examined the patient myself in person on 6/1/23          "

## 2023-06-02 NOTE — PROGRESS NOTES
06/02/23 1100   Pain Assessment   Pain Assessment Tool 0-10   Pain Score No Pain   Restrictions/Precautions   Precautions Aphasia; Aspiration;Bed/chair alarms;Cognitive; Fall Risk; [place]; Impulsive;Supervision on toilet/commode;Visual deficit   Weight Bearing Restrictions No   ROM Restrictions No   Sit to Stand   Type of Assistance Needed Physical assistance;Verbal cues; Adaptive equipment   Physical Assistance Level 25% or less   Comment Valery w/verónica walker   Sit to Stand CARE Score 3   Bed-Chair Transfer   Type of Assistance Needed Physical assistance;Verbal cues; Adaptive equipment   Physical Assistance Level 26%-50%   Comment SPT w/verónica walker, no LOB, vc's to scoot forward before standing and for hand placement   Chair/Bed-to-Chair Transfer CARE Score 3   Toileting Hygiene   Type of Assistance Needed Physical assistance   Physical Assistance Level Total assistance   Comment pt seated on wide BSC upon therapist arrival, pt cont to require Ax2 (1st person CGA for balance while pt in stance at bedrail, 2nd person completing rear hygiene and CM)  Ax2 for safety  AGUILAR emptied [place] bag (arnaldo Barragan inputted data in I/O tab, see I/O for more info)  Toileting Hygiene CARE Score 1   Toileting   Able to Pull Clothing down no, up no  Able to Manage Clothing Closures No   Manage Hygiene Bowel   Limitations Noted In Balance; Coordination;Problem Solving;ROM;Safety; Sequencing;UE Strength;LE Strength   Toilet Transfer   Type of Assistance Needed Physical assistance   Physical Assistance Level Total assistance   Comment pt was already positioned seated on BSC facing bedrail upon therapist arrival (positioned in swap-out method) so aguilar completed swap-out BSC>w/c with Ax2 (1st person CGA-Valery for pt balance, 2nd person swapping equipment), with pt BUE support on bedrail, no LOB   Toilet Transfer CARE Score 1   Toilet Transfer   Surface Assessed   (wide BSC)   Transfer Technique   (swap out)   Limitations Noted In "Balance; Endurance;UE Strength;LE Strength   Adaptive Equipment   (bedrail)   Positioning Concerns Cognition; Safety   Functional Standing Tolerance   Time 2min x2   Activity tabletop stance w/fxl reaching   Comments CGA in stance at raised tabletop with LUE support on verónica walker and using RUE for fxl reaching to retrieve foam letters from tabletop ahead  Focus was on increasing standing tolerance during fxl reaching for increased indep with ADLs  Therapist initially instructed pt to locate certain letters, but pt unable to complete this out of 3 trials, so carrizales downgraded task to locating foam pieces by color (e g  \"find a purple letter\") but pt still unable to complete, so downgraded to therapist pointing to foam letter that pt was to retrieve, then after pt picked piece, then therapist pointed to area on R side of table where pt stacked retrieved letters  Focus was on increasing R attention and visual scanning to far R visual field  Pt able to complete this task with simple 1-step verbal directions repeated 2-3x each, with tactile cueing of therapist pointing to target  Pt demo max difficulty locating targets in R VF even with frequent/repetitive verbal/tactile cueing  Cognition   Overall Cognitive Status Impaired   Arousal/Participation Alert; Cooperative   Attention Attends with cues to redirect   Orientation Level Oriented to person;Oriented to place   Memory Decreased short term memory;Decreased recall of recent events;Decreased recall of precautions   Following Commands Follows one step commands inconsistently   Activity Tolerance   Activity Tolerance Patient tolerated treatment well   Assessment   Treatment Assessment Pt seen for brief 30min skilled OT session focused on toileting, fxl transfers w/verónica walker, standing tolerance w/verónica walker, and visual scanning to R VF (R attention), for increased independence w/ADLs and decreased caregiver burden  See detailed descriptions of fxl performance above   Pt " tolerated treatment well, although cont to be significantly limited by aphasia, cog impairment, endurance, coordination, weakness, standing balance, frustration tolerance, safety awareness, judgment, visual deficits, and R inattention  Pt would benefit from continued skilled OT focused on Adl retraining, fxl transfer training w/verónica walker, UE strengthening/ROM, endurance work, compensatory technique edu, ECT edu, and activity tolerance, in preparation for anticipated D/C to SNF  Prognosis Fair   Problem List Decreased strength;Decreased range of motion;Decreased endurance; Impaired balance;Decreased mobility; Decreased coordination;Decreased cognition; Impaired judgement;Decreased safety awareness; Impaired vision; Impaired sensation;Obesity;Pain   Barriers to Discharge Inaccessible home environment;Decreased caregiver support   Plan   Treatment/Interventions ADL retraining;Functional transfer training; Therapeutic exercise; Endurance training;Cognitive reorientation;Patient/family training;Equipment eval/education; Bed mobility; Compensatory technique education;Spoke to nursing   Progress Progressing toward goals   Recommendation   OT Discharge Recommendation Post acute rehabilitation services  (SNF)   OT Therapy Minutes   OT Time In 1100   OT Time Out 1130   OT Total Time (minutes) 30   OT Mode of treatment - Individual (minutes) 30   OT Mode of treatment - Concurrent (minutes) 0   OT Mode of treatment - Group (minutes) 0   OT Mode of treatment - Co-treat (minutes) 0   OT Mode of Treatment - Total time(minutes) 30 minutes   OT Cumulative Minutes 1435   Therapy Time missed   Time missed?  No

## 2023-06-02 NOTE — ASSESSMENT & PLAN NOTE
5/30 - MRI Brain - Similar areas of left parietal occipital recent ischemia/infarct  Some new areas of acute to subacute left parietal/occipital acute to subacute ischemia  Findings suggesting normal pressure hydrocephalus    Neuro recommends OP [facility] clinic c/ NSx after d/c - discussed with wife and she reports adequate understanding and will set-up  Fall precautions, PT, OT, monitor b/b function, and cognition

## 2023-06-02 NOTE — ASSESSMENT & PLAN NOTE
Significant at times;  Baclofen 5mg BID PRN   Has had chronically since prior CVA but significantly worse recently; strength stable; possible recent UTI - completed course Abx

## 2023-06-03 PROCEDURE — 99232 SBSQ HOSP IP/OBS MODERATE 35: CPT | Performed by: INTERNAL MEDICINE

## 2023-06-03 RX ADMIN — ASPIRIN 81 MG: 81 TABLET, COATED ORAL at 10:03

## 2023-06-03 RX ADMIN — LIDOCAINE 5% 1 PATCH: 700 PATCH TOPICAL at 10:05

## 2023-06-03 RX ADMIN — SENNOSIDES, DOCUSATE SODIUM 2 TABLET: 8.6; 5 TABLET ORAL at 10:10

## 2023-06-03 RX ADMIN — ATORVASTATIN CALCIUM 40 MG: 40 TABLET, FILM COATED ORAL at 10:03

## 2023-06-03 RX ADMIN — PANTOPRAZOLE SODIUM 20 MG: 20 TABLET, DELAYED RELEASE ORAL at 05:50

## 2023-06-03 RX ADMIN — METOPROLOL SUCCINATE 100 MG: 100 TABLET, EXTENDED RELEASE ORAL at 10:04

## 2023-06-03 RX ADMIN — CITALOPRAM HYDROBROMIDE 20 MG: 20 TABLET ORAL at 10:05

## 2023-06-03 RX ADMIN — POLYETHYLENE GLYCOL 3350 17 G: 17 POWDER, FOR SOLUTION ORAL at 10:10

## 2023-06-03 RX ADMIN — CILOSTAZOL 100 MG: 100 TABLET ORAL at 05:49

## 2023-06-03 RX ADMIN — DONEPEZIL HYDROCHLORIDE 5 MG: 5 TABLET ORAL at 10:05

## 2023-06-03 RX ADMIN — CILOSTAZOL 100 MG: 100 TABLET ORAL at 15:10

## 2023-06-03 RX ADMIN — HEPARIN SODIUM 5000 UNITS: 5000 INJECTION INTRAVENOUS; SUBCUTANEOUS at 21:20

## 2023-06-03 RX ADMIN — TICAGRELOR 90 MG: 90 TABLET ORAL at 10:08

## 2023-06-03 RX ADMIN — HEPARIN SODIUM 5000 UNITS: 5000 INJECTION INTRAVENOUS; SUBCUTANEOUS at 05:50

## 2023-06-03 RX ADMIN — TAMSULOSIN HYDROCHLORIDE 0.8 MG: 0.4 CAPSULE ORAL at 17:13

## 2023-06-03 RX ADMIN — TICAGRELOR 90 MG: 90 TABLET ORAL at 21:22

## 2023-06-03 RX ADMIN — DONEPEZIL HYDROCHLORIDE 5 MG: 5 TABLET ORAL at 17:13

## 2023-06-03 RX ADMIN — LOSARTAN POTASSIUM 50 MG: 50 TABLET, FILM COATED ORAL at 10:04

## 2023-06-03 RX ADMIN — TRAZODONE HYDROCHLORIDE 50 MG: 50 TABLET ORAL at 21:20

## 2023-06-03 RX ADMIN — LEVOFLOXACIN 750 MG: 750 TABLET, FILM COATED ORAL at 12:07

## 2023-06-03 RX ADMIN — HEPARIN SODIUM 5000 UNITS: 5000 INJECTION INTRAVENOUS; SUBCUTANEOUS at 15:07

## 2023-06-03 NOTE — PLAN OF CARE
Reviewed    Problem: PAIN - ADULT  Goal: Verbalizes/displays adequate comfort level or baseline comfort level  Description: Interventions:  - Encourage patient to monitor pain and request assistance  - Assess pain using appropriate pain scale  - Administer analgesics based on type and severity of pain and evaluate response  - Implement non-pharmacological measures as appropriate and evaluate response  - Consider cultural and social influences on pain and pain management  - Notify physician/advanced practitioner if interventions unsuccessful or patient reports new pain  Outcome: Progressing     Problem: INFECTION - ADULT  Goal: Absence or prevention of progression during hospitalization  Description: INTERVENTIONS:  - Assess and monitor for signs and symptoms of infection  - Monitor lab/diagnostic results  - Monitor all insertion sites, i e  indwelling lines, tubes, and drains  - Monitor endotracheal if appropriate and nasal secretions for changes in amount and color  - [place] appropriate cooling/warming therapies per order  - Administer medications as ordered  - Instruct and encourage patient and family to use good hand hygiene technique  - Identify and instruct in appropriate isolation precautions for identified infection/condition  Outcome: Progressing  Goal: Absence of fever/infection during neutropenic period  Description: INTERVENTIONS:  - Monitor WBC    Outcome: Progressing     Problem: SAFETY ADULT  Goal: Patient will remain free of falls  Description: INTERVENTIONS:  - Educate patient/family on patient safety including physical limitations  - Instruct patient to call for assistance with activity   - Consult OT/PT to assist with strengthening/mobility   - Keep Call bell within reach  - Keep bed low and locked with side rails adjusted as appropriate  - Keep care items and personal belongings within reach  - Initiate and maintain comfort rounds  - Make Fall Risk Sign visible to staff  - Offer Toileting every 4 Hours, in advance of need  - Initiate/Maintain bed alarm  - Apply yellow socks and bracelet for high fall risk patients  - Consider moving patient to room near nurses station  Outcome: Progressing  Goal: Maintain or return to baseline ADL function  Description: INTERVENTIONS:  -  Assess patient's ability to carry out ADLs; assess patient's baseline for ADL function and identify physical deficits which impact ability to perform ADLs (bathing, care of mouth/teeth, toileting, grooming, dressing, etc )  - Assess/evaluate cause of self-care deficits   - Assess range of motion  - Assess patient's mobility; develop plan if impaired  - Assess patient's need for assistive devices and provide as appropriate  - Encourage maximum independence but intervene and supervise when necessary  - Involve family in performance of ADLs  - Assess for home care needs following discharge   - Consider OT consult to assist with ADL evaluation and planning for discharge  - Provide patient education as appropriate  Outcome: Progressing  Goal: Maintains/Returns to pre admission functional level  Description: INTERVENTIONS:  - Set and communicate daily mobility goal to care team and patient/family/caregiver     - Collaborate with rehabilitation services on mobility goals if consulted  - Out of bed for toileting  - Record patient progress and toleration of activity level   Outcome: Progressing     Problem: DISCHARGE PLANNING  Goal: Discharge to home or other facility with appropriate resources  Description: INTERVENTIONS:  - Identify barriers to discharge w/patient and caregiver  - Arrange for needed discharge resources and transportation as appropriate  - Identify discharge learning needs (meds, wound care, etc )  - Arrange for interpretive services to assist at discharge as needed  - Refer to Case Management Department for coordinating discharge planning if the patient needs post-hospital services based on physician/advanced practitioner order or complex needs related to functional status, cognitive ability, or social support system  Outcome: Progressing     Problem: Prexisting or High Potential for Compromised Skin Integrity  Goal: Skin integrity is maintained or improved  Description: INTERVENTIONS:  - Identify patients at risk for skin breakdown  - Assess and monitor skin integrity  - Assess and monitor nutrition and hydration status  - Monitor labs   - Assess for incontinence   - Turn and reposition patient  - Assist with mobility/ambulation  - Relieve pressure over bony prominences  - Avoid friction and shearing  - Provide appropriate hygiene as needed including keeping skin clean and dry  - Evaluate need for skin moisturizer/barrier cream  - Collaborate with interdisciplinary team   - Patient/family teaching  - Consider wound care consult   Outcome: Progressing     Problem: MOBILITY - ADULT  Goal: Maintain or return to baseline ADL function  Description: INTERVENTIONS:  -  Assess patient's ability to carry out ADLs; assess patient's baseline for ADL function and identify physical deficits which impact ability to perform ADLs (bathing, care of mouth/teeth, toileting, grooming, dressing, etc )  - Assess/evaluate cause of self-care deficits   - Assess range of motion  - Assess patient's mobility; develop plan if impaired  - Assess patient's need for assistive devices and provide as appropriate  - Encourage maximum independence but intervene and supervise when necessary  - Involve family in performance of ADLs  - Assess for home care needs following discharge   - Consider OT consult to assist with ADL evaluation and planning for discharge  - Provide patient education as appropriate  Outcome: Progressing  Goal: Maintains/Returns to pre admission functional level  Description: INTERVENTIONS:  - Set and communicate daily mobility goal to care team and patient/family/caregiver     - Collaborate with rehabilitation services on mobility goals if consulted  - Out of bed for toileting  - Record patient progress and toleration of activity level   Outcome: Progressing     Problem: Nutrition/Hydration-ADULT  Goal: Nutrient/Hydration intake appropriate for improving, restoring or maintaining nutritional needs  Description: Monitor and assess patient's nutrition/hydration status for malnutrition  Collaborate with interdisciplinary team and initiate plan and interventions as ordered  Monitor patient's weight and dietary intake as ordered or per policy  Utilize nutrition screening tool and intervene as necessary  Determine patient's food preferences and provide high-protein, high-caloric foods as appropriate       INTERVENTIONS:  - Monitor oral intake, urinary output, labs, and treatment plans  - Assess nutrition and hydration status and recommend course of action  - Evaluate amount of meals eaten  - Assist patient with eating if necessary   - Allow adequate time for meals  - Recommend/ encourage appropriate diets, oral nutritional supplements, and vitamin/mineral supplements  - Order, calculate, and assess calorie counts as needed  - Recommend, monitor, and adjust tube feedings and TPN/PPN based on assessed needs  - Assess need for intravenous fluids  - Provide specific nutrition/hydration education as appropriate  - Include patient/family/caregiver in decisions related to nutrition  Outcome: Progressing

## 2023-06-03 NOTE — PROGRESS NOTES
"Internal Medicine Progress Note  Patient: Abdulkadir Shock  Age/sex: 58 y o  male  Medical Record #: [id number]      ASSESSMENT/PLAN: (Interval History)  Abdulkadir Shock is seen and examined and management for following issues:    Acute multifocal ischemic strokes/recurrent left hemispheric infarction  • Occurred in different arterial distributions  • IVY was negative for thrombus/PFO  • Felt not to be vasculitis and negative by a-gram 5/4/23  • Continue ASA/Brilinta/statin  • [facility] 5/29 = was abnormal with possible new infarcts after he felt his speech had declined   (Dr Aguila Cook had seen him 5/28 for this complaint and had normal exam and felt speech was at baseline)  • MRI brain 5/30 = some new areas of acute to subacute left parietal/occipital infarcts; +NPH noted  • CTH/CTA 5/31/23 = \"Subacute ischemia within the left hemisphere without mass effect or taiwo hemorrhagic transformation  Advanced chronic microangiopathic change within the brain parenchyma  Stable atherosclerotic disease of the right carotid bifurcation including the ICA origin and midportion of the bulb, approximately 60 to 70% at the point of maximal stenosis  A carotid stent has been placed on the left with only mild residual narrowing within the proximal internal carotid artery  Stable severe stenosis of the distal right M1 segment with decreased size and number of M2 branches\"    • Neurology added Pletal onto the ASA and Brilinta on 6/1/23 for recurrent left hemispheric infarction with unclear etio  • Will need to followup in [facility] clinic as OP  • S/p IVF 2/2 having CTA      Left ICA stenosis  • Was noted to have all of the CVAs in last month have been in left anterior circulation  • S/p PTA/stent 5/4/23  • Plan was to recheck carotid doppler 6 weeks and see NS but 2/2 inc expresive aphasia the above workup was done  • Continue AP/statin     Right ICA stenosis  • To followup with Vasc surgery as OP  • Continue AP/statin     LOOP recorder  • Was " placed 3/2019  • Neuro wants it to be replaced = for OP to Cardiology     Leukocytosis  • Had no fevers  • CXR and Procal were normal in the hospital  • resolved     HTN  • Home:  Toprol 100mg qd/Norvasc 10mg qd/Losartan 50mg qd/HCTZ 25mg qd/Hydralazine 25mg TID  • Here:  Toprol 100mg qd/Losartan 50mg qd  • stable     Pre DM  • HbA1C 5 8  • Takes Metformin at home but currently not on it in hospital  • Accuchecks were stable and dc'd in the hospital  • Monitor FBS with BMPs and continue DM diet  • FBS 92 on 6/1/23     Depression  • Continue Celexa as at home     Memory loss  • Continue Aricept as at home  • He has had memory issues since his CVA in 2019     Urine retention  • Has jo placed 5/8/23  • VT 5/18 failed and jo replaced by Urology 5/19/23 and is not to be removed  • Continue Flomax  • Today found to have hematuria  Suspect traumatic cause  PMR to manage      Chronic LBP  • MRI showed advanced multilevel spondylosis, slightly progressed on the right at L2-3 compared to the prior study but otherwise stable  • Pain management per PMR     AAA  • Found on L-spine MRI  • Measures 3 4 cm  • OP followup     Hyponatremia  • Stopped HCTZ 5/16/23  • Resolved off of HCTZ and had been given IVF     Leg cramps  • Per PMR     UTI  • cx = >100,000 Enterococcus faecalis and 20,000-29,000 mixed contaminants x 2  • Has had no fever   • Dr Mahsa Lopez started Keflex 5/30 --> switched to Levaquin 750mg qd x 5 on 6/1 based on sensitivities = d/w Dr Minnie Mcdowell     Thyroid nodules  • Found on CTA H/N  • Thyroid U/S 6/1/23 = right upper pole nodule 1 6 x 1 0 x 1 4 cm --> bx was recommended        Discharge date:  pending subacute rehab    The above assessment and plan was reviewed and updated as determined by my evaluation of the patient on 6/3/2023      Labs:   Results from last 7 days   Lab Units 06/01/23  0547 05/28/23  0507   HEMATOCRIT % 36 0* 36 5   HEMOGLOBIN g/dL 12 4 12 0   PLATELETS Thousands/uL 252 277   WBC Thousand/uL 8 32 10 69*     Results from last 7 days   Lab Units 06/01/23  0547 05/28/23  0507   BUN mg/dL 15 19   CALCIUM mg/dL 9 1 9 1   CHLORIDE mmol/L 111* 109*   CO2 mmol/L 24 26   CREATININE mg/dL 0 93 1 04   POTASSIUM mmol/L 3 6 3 6   SODIUM mmol/L 138 138                   Review of Scheduled Meds:  Current Facility-Administered Medications   Medication Dose Route Frequency Provider Last Rate   • acetaminophen  975 mg Oral TID PRN Priscilla Garcia MD     • aspirin  81 mg Oral Daily Terrea Ball, DO     • atorvastatin  40 mg Oral Daily Terrea Ball, DO     • baclofen  5 mg Oral BID PRN Priscilla Garcia MD     • bisacodyl  10 mg Rectal Daily PRN Terrea Ball, DO     • calcium carbonate  1,000 mg Oral Daily PRN Terrea Ball, DO     • cilostazol  100 mg Oral BID AC Priscilla Garcia MD     • citalopram  20 mg Oral Daily Terrea Ball, DO     • donepezil  5 mg Oral BID Terrea Ball, DO     • heparin (porcine)  5,000 Units Subcutaneous [facility] Terrea Ball, DO     • levofloxacin  750 mg Oral Q24H Priscilla Garcia MD     • lidocaine  1 patch Topical Daily Terrea Ball, DO     • lidocaine   Topical Q4H PRN Priscilla Garcia MD     • losartan  50 mg Oral Daily Priscilla Garcia MD     • menthol-methyl salicylate   Apply externally 4x Daily PRN Gildardo Glover MD     • metoprolol succinate  100 mg Oral Daily Priscilla Garcia MD     • ondansetron  4 mg Oral Q6H PRN Terrea Ball, DO     • oxyCODONE  5 mg Oral Q6H PRN Terrea Ball, DO     • oxyCODONE  2 5 mg Oral Q6H PRN Terrea Ball, DO     • pantoprazole  20 mg Oral Early Morning Terrea Ball, DO     • polyethylene glycol  17 g Oral QAM Terrea Ball, DO     • senna  2 tablet Oral Daily PRN Terrea Ball, DO     • senna-docusate sodium  2 tablet Oral BID Terrea Ball, DO     • tamsulosin  0 8 mg Oral Daily With [address], CRNP     • ticagrelor  90 mg Oral Q12H [facility] [place] Terrea Ball, DO     • traZODone  50 mg Oral  Braulio Cid Mary Schuler MD         Subjective/ HPI: Patient seen and examined  Patients overnight issues or events were reviewed with nursing or staff during rounds or morning huddle session  New or overnight issues include the following:     Pt seen in his room  He denies any current complaints  ROS:   A 10 point ROS was performed; negative except as noted above  Imaging:     US thyroid   Final Result by Mitchell Adams MD (06/01 1421)      The following meet current ACR criteria for recommending ultrasound guided biopsy:   - RIGHT upper pole nodule measuring 1 6 x 1 0 x 1 4 cm         Reference: ACR Thyroid Imaging, Reporting and Data System (TI-RADS): White Paper of the [place]  J AM Aram Radiol 9228;55:[id number]  (additional recommendations based on American Thyroid Association 2015 guidelines )      The study was marked in [facility] for immediate notification  Workstation performed: [id number]         CTA head and neck w wo contrast   Final Result by Reynaldo Raymond DO (05/31 1641)      CT brain: Subacute ischemia within the left hemisphere without mass effect or taiwo hemorrhagic transformation  Advanced chronic microangiopathic change within the brain parenchyma  CT angiogram: Stable atherosclerotic disease of the right carotid bifurcation including the ICA origin and midportion of the bulb, approximately 60 to 70% at the point of maximal stenosis  A carotid stent has been placed on the left with only mild residual narrowing within the proximal internal carotid artery  Stable severe stenosis of the distal right M1 segment with decreased size and number of M2 branches  Workstation performed: [id number]         MRI brain wo contrast   Final Result by Sharon Raymond MD (05/30 5890)      Similar areas of left parietal occipital recent ischemia/infarct  Some new areas of acute to subacute left parietal/occipital acute to subacute ischemia     Findings suggesting normal "pressure hydrocephalus  Findings were marked as \"immediate\"in Epic and will now be related to the ordering physician or covering clinical team by the radiology liaison  Workstation performed: [id number]         CT head wo contrast   Final Result by Leanna Rutledge MD (05/30 0892)      Slight interval progression and extension of the nonhemorrhagic acute to early subacute left cerebral infarction noted on the recent MRI study  Mild regional sulcal crowding but no significant mass effect on the underlying ventricular system or evidence    of herniation  Redemonstrated disproportionate ventriculomegaly to sulcal prominence which can be seen in patients with normal pressure hydrocephalus  Background cerebral white matter changes consistent with moderate chronic microangiopathic disease  The study was marked in [facility] for immediate notification              Workstation performed: [id number]             *Labs /Radiology studies reviewed  *Medications reviewed and reconciled as needed  *Please refer to order section for additional ordered labs studies  *Case discussed with primary attending during morning huddle case rounds    Physical Examination:  Vitals:   Vitals:    06/02/23 1355 06/02/23 2117 06/03/23 0500 06/03/23 1001   BP: 124/78 157/65 144/62 131/66   BP Location: Left arm Right arm Left arm Right arm   Pulse: (!) 108 88 99 105   Resp: 18 18 18    Temp: 97 8 °F (36 6 °C) 98 3 °F (36 8 °C) 98 °F (36 7 °C)    TempSrc: Oral Oral Oral    SpO2: 97% 99% 95%    Weight:           General Appearance: no distress, conversive  HEENT: PERRLA, conjuctiva normal; oropharynx clear; mucous membranes moist   Neck:  Supple, normal ROM  Lungs: CTA, normal respiratory effort, no retractions, expiratory effort normal  CV: regular rate and rhythm; no rubs/murmurs/gallops, PMI normal   ABD: soft; ND/NT; +BS  EXT: no edema  Skin: normal turgor, normal texture, no rashes  Psych: affect normal, mood normal  Neuro: Awake " and alert  : Cortez with pick urine    The above physical exam was reviewed and updated as determined by my evaluation of the patient on 6/3/2023  Invasive Devices     Peripheral Intravenous Line  Duration           Peripheral IV 05/31/23 Left;Upper Arm 2 days          Drain  Duration           Urethral Catheter Coude 16 Fr  14 days                   VTE Pharmacologic Prophylaxis: Heparin  Code Status: Level 1 - Full Code  Current Length of Stay: 22 day(s)      Total time spent:  30 minutes with more than 50% spent counseling/coordinating care  Counseling includes discussion with patient re: progress  and discussion with patient of his/her current medical state/information  Coordination of patient's care was performed in conjunction with primary service  Time invested included review of patient's labs, vitals, and management of their comorbidities with continued monitoring  In addition, this patient was discussed with medical team including physician and advanced extenders  The care of the patient was extensively discussed and appropriate treatment plan was formulated unique for this patient  Medical decision making for the day was made by supervising physician unless otherwise noted in their attestation statement  ** Please Note:  voice to text software may have been used in the creation of this document   Although proof errors in transcription or interpretation are a potential of such software**

## 2023-06-03 NOTE — QUICK NOTE
PMR Quick Note    Cortez may have been tugged during therapies with slight pink tinge  No discomfort and able to flush  Will irrigate Q6hr, and if clear tomorrow can monitor off  If clotted/difficult to flush, will consult Urology      Linda Cleary MD  Physical Medicine and Rehabilitation

## 2023-06-03 NOTE — NURSING NOTE
Urine noted to have pink tinge in jo, pt with no c/o pain or discomfort at this time  LUANN Simms made aware and visualized pt  No new orders, will continue to monitor

## 2023-06-04 PROCEDURE — 99232 SBSQ HOSP IP/OBS MODERATE 35: CPT | Performed by: INTERNAL MEDICINE

## 2023-06-04 PROCEDURE — 97530 THERAPEUTIC ACTIVITIES: CPT

## 2023-06-04 PROCEDURE — 97535 SELF CARE MNGMENT TRAINING: CPT

## 2023-06-04 PROCEDURE — 97112 NEUROMUSCULAR REEDUCATION: CPT

## 2023-06-04 RX ADMIN — HEPARIN SODIUM 5000 UNITS: 5000 INJECTION INTRAVENOUS; SUBCUTANEOUS at 21:38

## 2023-06-04 RX ADMIN — CITALOPRAM HYDROBROMIDE 20 MG: 20 TABLET ORAL at 10:02

## 2023-06-04 RX ADMIN — ASPIRIN 81 MG: 81 TABLET, COATED ORAL at 10:02

## 2023-06-04 RX ADMIN — DONEPEZIL HYDROCHLORIDE 5 MG: 5 TABLET ORAL at 17:06

## 2023-06-04 RX ADMIN — TICAGRELOR 90 MG: 90 TABLET ORAL at 21:38

## 2023-06-04 RX ADMIN — CILOSTAZOL 100 MG: 100 TABLET ORAL at 17:07

## 2023-06-04 RX ADMIN — SENNOSIDES, DOCUSATE SODIUM 2 TABLET: 8.6; 5 TABLET ORAL at 17:06

## 2023-06-04 RX ADMIN — TRAZODONE HYDROCHLORIDE 50 MG: 50 TABLET ORAL at 21:38

## 2023-06-04 RX ADMIN — LOSARTAN POTASSIUM 50 MG: 50 TABLET, FILM COATED ORAL at 10:02

## 2023-06-04 RX ADMIN — HEPARIN SODIUM 5000 UNITS: 5000 INJECTION INTRAVENOUS; SUBCUTANEOUS at 14:20

## 2023-06-04 RX ADMIN — DONEPEZIL HYDROCHLORIDE 5 MG: 5 TABLET ORAL at 10:02

## 2023-06-04 RX ADMIN — PANTOPRAZOLE SODIUM 20 MG: 20 TABLET, DELAYED RELEASE ORAL at 05:19

## 2023-06-04 RX ADMIN — CILOSTAZOL 100 MG: 100 TABLET ORAL at 06:32

## 2023-06-04 RX ADMIN — TICAGRELOR 90 MG: 90 TABLET ORAL at 10:03

## 2023-06-04 RX ADMIN — HEPARIN SODIUM 5000 UNITS: 5000 INJECTION INTRAVENOUS; SUBCUTANEOUS at 05:19

## 2023-06-04 RX ADMIN — METOPROLOL SUCCINATE 100 MG: 100 TABLET, EXTENDED RELEASE ORAL at 10:03

## 2023-06-04 RX ADMIN — TAMSULOSIN HYDROCHLORIDE 0.8 MG: 0.4 CAPSULE ORAL at 17:06

## 2023-06-04 RX ADMIN — LIDOCAINE 5% 1 PATCH: 700 PATCH TOPICAL at 10:07

## 2023-06-04 RX ADMIN — ATORVASTATIN CALCIUM 40 MG: 40 TABLET, FILM COATED ORAL at 10:02

## 2023-06-04 RX ADMIN — LEVOFLOXACIN 750 MG: 750 TABLET, FILM COATED ORAL at 11:41

## 2023-06-04 NOTE — PLAN OF CARE
Problem: PAIN - ADULT  Goal: Verbalizes/displays adequate comfort level or baseline comfort level  Description: Interventions:  - Encourage patient to monitor pain and request assistance  - Assess pain using appropriate pain scale  - Administer analgesics based on type and severity of pain and evaluate response  - Implement non-pharmacological measures as appropriate and evaluate response  - Consider cultural and social influences on pain and pain management  - Notify physician/advanced practitioner if interventions unsuccessful or patient reports new pain  Outcome: Progressing     Problem: INFECTION - ADULT  Goal: Absence or prevention of progression during hospitalization  Description: INTERVENTIONS:  - Assess and monitor for signs and symptoms of infection  - Monitor lab/diagnostic results  - Monitor all insertion sites, i e  indwelling lines, tubes, and drains  - Monitor endotracheal if appropriate and nasal secretions for changes in amount and color  - Kinney appropriate cooling/warming therapies per order  - Administer medications as ordered  - Instruct and encourage patient and family to use good hand hygiene technique  - Identify and instruct in appropriate isolation precautions for identified infection/condition  Outcome: Progressing  Goal: Absence of fever/infection during neutropenic period  Description: INTERVENTIONS:  - Monitor WBC    Outcome: Progressing     Problem: SAFETY ADULT  Goal: Patient will remain free of falls  Description: INTERVENTIONS:  - Educate patient/family on patient safety including physical limitations  - Instruct patient to call for assistance with activity   - Consult OT/PT to assist with strengthening/mobility   - Keep Call bell within reach  - Keep bed low and locked with side rails adjusted as appropriate  - Keep care items and personal belongings within reach  - Initiate and maintain comfort rounds  - Make Fall Risk Sign visible to staff  - Offer Toileting every 2 Hours, in advance of need  - Initiate/Maintain bed alarm  - Obtain necessary fall risk management equipment: bed alarm  - Apply yellow socks and bracelet for high fall risk patients  - Consider moving patient to room near nurses station  Outcome: Progressing  Goal: Maintain or return to baseline ADL function  Description: INTERVENTIONS:  -  Assess patient's ability to carry out ADLs; assess patient's baseline for ADL function and identify physical deficits which impact ability to perform ADLs (bathing, care of mouth/teeth, toileting, grooming, dressing, etc )  - Assess/evaluate cause of self-care deficits   - Assess range of motion  - Assess patient's mobility; develop plan if impaired  - Assess patient's need for assistive devices and provide as appropriate  - Encourage maximum independence but intervene and supervise when necessary  - Involve family in performance of ADLs  - Assess for home care needs following discharge   - Consider OT consult to assist with ADL evaluation and planning for discharge  - Provide patient education as appropriate  Outcome: Progressing  Goal: Maintains/Returns to pre admission functional level  Description: INTERVENTIONS:  - Perform BMAT or MOVE assessment daily    - Set and communicate daily mobility goal to care team and patient/family/caregiver  - Collaborate with rehabilitation services on mobility goals if consulted  - Perform Range of Motion 3 times a day  - Reposition patient every 2 hours    - Dangle patient 3 times a day  - Stand patient 3 times a day  - Ambulate patient 3 times a day  - Out of bed to chair 3 times a day   - Out of bed for meals 3 times a day  - Out of bed for toileting  - Record patient progress and toleration of activity level   Outcome: Progressing     Problem: DISCHARGE PLANNING  Goal: Discharge to home or other facility with appropriate resources  Description: INTERVENTIONS:  - Identify barriers to discharge w/patient and caregiver  - Arrange for needed discharge resources and transportation as appropriate  - Identify discharge learning needs (meds, wound care, etc )  - Arrange for interpretive services to assist at discharge as needed  - Refer to Case Management Department for coordinating discharge planning if the patient needs post-hospital services based on physician/advanced practitioner order or complex needs related to functional status, cognitive ability, or social support system  Outcome: Progressing     Problem: Prexisting or High Potential for Compromised Skin Integrity  Goal: Skin integrity is maintained or improved  Description: INTERVENTIONS:  - Identify patients at risk for skin breakdown  - Assess and monitor skin integrity  - Assess and monitor nutrition and hydration status  - Monitor labs   - Assess for incontinence   - Turn and reposition patient  - Assist with mobility/ambulation  - Relieve pressure over bony prominences  - Avoid friction and shearing  - Provide appropriate hygiene as needed including keeping skin clean and dry  - Evaluate need for skin moisturizer/barrier cream  - Collaborate with interdisciplinary team   - Patient/family teaching  - Consider wound care consult   Outcome: Progressing     Problem: MOBILITY - ADULT  Goal: Maintain or return to baseline ADL function  Description: INTERVENTIONS:  -  Assess patient's ability to carry out ADLs; assess patient's baseline for ADL function and identify physical deficits which impact ability to perform ADLs (bathing, care of mouth/teeth, toileting, grooming, dressing, etc )  - Assess/evaluate cause of self-care deficits   - Assess range of motion  - Assess patient's mobility; develop plan if impaired  - Assess patient's need for assistive devices and provide as appropriate  - Encourage maximum independence but intervene and supervise when necessary  - Involve family in performance of ADLs  - Assess for home care needs following discharge   - Consider OT consult to assist with ADL evaluation and planning for discharge  - Provide patient education as appropriate  Outcome: Progressing  Goal: Maintains/Returns to pre admission functional level  Description: INTERVENTIONS:  - Perform BMAT or MOVE assessment daily    - Set and communicate daily mobility goal to care team and patient/family/caregiver  - Collaborate with rehabilitation services on mobility goals if consulted  - Perform Range of Motion 3 times a day  - Reposition patient every 2 hours  - Dangle patient 3 times a day  - Stand patient 3 times a day  - Ambulate patient 3 times a day  - Out of bed to chair 3 times a day   - Out of bed for meals 3 times a day  - Out of bed for toileting  - Record patient progress and toleration of activity level   Outcome: Progressing     Problem: Nutrition/Hydration-ADULT  Goal: Nutrient/Hydration intake appropriate for improving, restoring or maintaining nutritional needs  Description: Monitor and assess patient's nutrition/hydration status for malnutrition  Collaborate with interdisciplinary team and initiate plan and interventions as ordered  Monitor patient's weight and dietary intake as ordered or per policy  Utilize nutrition screening tool and intervene as necessary  Determine patient's food preferences and provide high-protein, high-caloric foods as appropriate       INTERVENTIONS:  - Monitor oral intake, urinary output, labs, and treatment plans  - Assess nutrition and hydration status and recommend course of action  - Evaluate amount of meals eaten  - Assist patient with eating if necessary   - Allow adequate time for meals  - Recommend/ encourage appropriate diets, oral nutritional supplements, and vitamin/mineral supplements  - Order, calculate, and assess calorie counts as needed  - Recommend, monitor, and adjust tube feedings and TPN/PPN based on assessed needs  - Assess need for intravenous fluids  - Provide specific nutrition/hydration education as appropriate  - Include patient/family/caregiver in decisions related to nutrition  Outcome: Progressing

## 2023-06-04 NOTE — PROGRESS NOTES
06/04/23 0930   Pain Assessment   Pain Assessment Tool 0-10   Pain Score No Pain   Restrictions/Precautions   Precautions Aphasia; Aspiration;Bed/chair alarms;Cognitive; Fall Risk; [place]; Supervision on toilet/commode;Visual deficit   Weight Bearing Restrictions No   Cognition   Overall Cognitive Status Impaired   Arousal/Participation Alert; Cooperative   Attention Attends with cues to redirect   Orientation Level Oriented X4   Memory Decreased short term memory;Decreased recall of recent events;Decreased recall of precautions   Following Commands Follows one step commands with increased time or repetition   Subjective   Subjective Patient agreeable to PT session  Reports he is experiencing cramps in his LEs   Sit to Lying   Type of Assistance Needed Incidental touching   Physical Assistance Level No physical assistance   Comment HOB flat   Sit to Lying CARE Score 4   Sit to Stand   Type of Assistance Needed Physical assistance   Physical Assistance Level 25% or less   Comment Valery from [facility] with hemiwalker on Left side, multiple sit to stand transfers performed throughout the session   Sit to Stand CARE Score 3   Bed-Chair Transfer   Type of Assistance Needed Physical assistance   Physical Assistance Level 51%-75%   Comment Min/ModA for Stand Pivot WC>EOB with hemiwalker and Ambulatory Chair approach with hemiwalker , difficulty with sequencing and stepping backwards without lossing balance, physical assistance provided at trunk for support, VC to back up until he feels the chair behind his legs     Chair/Bed-to-Chair Transfer CARE Score 2   Car Transfer   Type of Assistance Needed Physical assistance   Physical Assistance Level 51%-75%   Comment ModA for stand pivot transfer from [facility] <>car with hemiwalker, VC for sequencing, physical assistance to get RLE into the car, proper placement of RLE prior to exiting car, and physical assistance to stand from low car height   Car Transfer CARE Score 2   Walk 10 Feet   Type of Assistance Needed Physical assistance   Physical Assistance Level Total assistance   Comment L hemiwalker, CGA-ModA on RLE x1 person, CGA/Valery x1 person Left side to assist with management of hemiwalker   Walk 10 Feet CARE Score 1   Walk 50 Feet with Two Turns   Reason if not Attempted Safety concerns   Walk 50 Feet with Two Turns CARE Score 88   Walk 150 Feet   Reason if not Attempted Safety concerns   Walk 150 Feet CARE Score 88   Ambulation   Primary Mode of Locomotion Prior to Admission Walk   Distance Walked (feet) 20 ft  (x2)   Assist Device George Walker   Gait Pattern Ataxic; Inconsistant Deepti;R foot drag;Narrow NEREIDA;Scissoring;Decreased R stance; Improper weight shift   Limitations Noted In Balance; Coordination;Device Management; Endurance;Midline Orientation;Neglect; Heel Strike;Posture; Safety; Sequencing;Speed;Strength;Swing   Provided Assistance with: Balance;Trunk Support;Weight Shift;Direction   Walk Assist Level Moderate Assist   Findings Focused on short distance ambulation as second person unavailable for longer ambulation distances  R AFO, R 4lb ankle weight for feedback, L hemiwalker with 4lb ankle weight  Trialed ambulation with assistance of 1 person, but unable to safely person as patient CGA to [address] on R side at times for trunk support/R lateral lean and required CGA/Valery of another person for sequencing/stability of hemiwalker  VC to scan environment, especially to the R side, and to locate WC to sit  Does the patient walk? 2  Yes   Toilet Transfer   Findings Patient L brake not locking properly  Attempted to tighten screw/bolt but it appears to be stripped  Will notifiy Supervision to see if a maintenance order can be place  Assessment   Treatment Assessment Patient participated in skilled 60 minute physical therapy session focusing on bed mobility, gait training with hemiwalker, and initiating car transfer training   Trialed short distance ambulation of 1 person but unable to safely perform and required CGA/Valery of second person for management of hemiwalker  He continues to require reminders to scan the environment to the R side  Patient would benefit from continued PT services to work on Stand pivot transfers, car transfers, gait training with hemiwalker, and NPP   Problem List Decreased strength;Decreased endurance; Impaired balance;Decreased mobility; Decreased coordination;Decreased cognition; Impaired judgement;Decreased safety awareness; Impaired vision   Barriers to Discharge Inaccessible home environment;Decreased caregiver support   PT Barriers   Functional Limitation Car transfers; Ramp negotiation;Stair negotiation;Standing;Transfers; Walking   Plan   Treatment/Interventions Functional transfer training;LE strengthening/ROM; Therapeutic exercise; Endurance training;Patient/family training;Bed mobility;Gait training   Progress Progressing toward goals   PT Therapy Minutes   PT Time In 0930   PT Time Out 1030   PT Total Time (minutes) 60   PT Mode of treatment - Individual (minutes) 60   PT Mode of treatment - Concurrent (minutes) 0   PT Mode of treatment - Group (minutes) 0   PT Mode of treatment - Co-treat (minutes) 0   PT Mode of Treatment - Total time(minutes) 60 minutes   PT Cumulative Minutes 1398   Therapy Time missed   Time missed?  No     Eloina Villa, PT, DPT

## 2023-06-04 NOTE — PLAN OF CARE
Reviewed    Problem: PAIN - ADULT  Goal: Verbalizes/displays adequate comfort level or baseline comfort level  Description: Interventions:  - Encourage patient to monitor pain and request assistance  - Assess pain using appropriate pain scale  - Administer analgesics based on type and severity of pain and evaluate response  - Implement non-pharmacological measures as appropriate and evaluate response  - Consider cultural and social influences on pain and pain management  - Notify physician/advanced practitioner if interventions unsuccessful or patient reports new pain  Outcome: Progressing     Problem: INFECTION - ADULT  Goal: Absence or prevention of progression during hospitalization  Description: INTERVENTIONS:  - Assess and monitor for signs and symptoms of infection  - Monitor lab/diagnostic results  - Monitor all insertion sites, i e  indwelling lines, tubes, and drains  - Monitor endotracheal if appropriate and nasal secretions for changes in amount and color  - [place] appropriate cooling/warming therapies per order  - Administer medications as ordered  - Instruct and encourage patient and family to use good hand hygiene technique  - Identify and instruct in appropriate isolation precautions for identified infection/condition  Outcome: Progressing  Goal: Absence of fever/infection during neutropenic period  Description: INTERVENTIONS:  - Monitor WBC    Outcome: Progressing     Problem: SAFETY ADULT  Goal: Patient will remain free of falls  Description: INTERVENTIONS:  - Educate patient/family on patient safety including physical limitations  - Instruct patient to call for assistance with activity   - Consult OT/PT to assist with strengthening/mobility   - Keep Call bell within reach  - Keep bed low and locked with side rails adjusted as appropriate  - Keep care items and personal belongings within reach  - Initiate and maintain comfort rounds  - Make Fall Risk Sign visible to staff  - Offer Toileting every 4 Hours, in advance of need  - Initiate/Maintain bed and chair alarm  - Apply yellow socks and bracelet for high fall risk patients  - Consider moving patient to room near nurses station  Outcome: Progressing  Goal: Maintain or return to baseline ADL function  Description: INTERVENTIONS:  -  Assess patient's ability to carry out ADLs; assess patient's baseline for ADL function and identify physical deficits which impact ability to perform ADLs (bathing, care of mouth/teeth, toileting, grooming, dressing, etc )  - Assess/evaluate cause of self-care deficits   - Assess range of motion  - Assess patient's mobility; develop plan if impaired  - Assess patient's need for assistive devices and provide as appropriate  - Encourage maximum independence but intervene and supervise when necessary  - Involve family in performance of ADLs  - Assess for home care needs following discharge   - Consider OT consult to assist with ADL evaluation and planning for discharge  - Provide patient education as appropriate  Outcome: Progressing  Goal: Maintains/Returns to pre admission functional level  Description: INTERVENTIONS:  - Set and communicate daily mobility goal to care team and patient/family/caregiver     - Collaborate with rehabilitation services on mobility goals if consulted  - Out of bed for toileting  - Record patient progress and toleration of activity level   Outcome: Progressing     Problem: DISCHARGE PLANNING  Goal: Discharge to home or other facility with appropriate resources  Description: INTERVENTIONS:  - Identify barriers to discharge w/patient and caregiver  - Arrange for needed discharge resources and transportation as appropriate  - Identify discharge learning needs (meds, wound care, etc )  - Arrange for interpretive services to assist at discharge as needed  - Refer to Case Management Department for coordinating discharge planning if the patient needs post-hospital services based on physician/advanced practitioner order or complex needs related to functional status, cognitive ability, or social support system  Outcome: Progressing     Problem: Prexisting or High Potential for Compromised Skin Integrity  Goal: Skin integrity is maintained or improved  Description: INTERVENTIONS:  - Identify patients at risk for skin breakdown  - Assess and monitor skin integrity  - Assess and monitor nutrition and hydration status  - Monitor labs   - Assess for incontinence   - Turn and reposition patient  - Assist with mobility/ambulation  - Relieve pressure over bony prominences  - Avoid friction and shearing  - Provide appropriate hygiene as needed including keeping skin clean and dry  - Evaluate need for skin moisturizer/barrier cream  - Collaborate with interdisciplinary team   - Patient/family teaching  - Consider wound care consult   Outcome: Progressing     Problem: MOBILITY - ADULT  Goal: Maintain or return to baseline ADL function  Description: INTERVENTIONS:  -  Assess patient's ability to carry out ADLs; assess patient's baseline for ADL function and identify physical deficits which impact ability to perform ADLs (bathing, care of mouth/teeth, toileting, grooming, dressing, etc )  - Assess/evaluate cause of self-care deficits   - Assess range of motion  - Assess patient's mobility; develop plan if impaired  - Assess patient's need for assistive devices and provide as appropriate  - Encourage maximum independence but intervene and supervise when necessary  - Involve family in performance of ADLs  - Assess for home care needs following discharge   - Consider OT consult to assist with ADL evaluation and planning for discharge  - Provide patient education as appropriate  Outcome: Progressing  Goal: Maintains/Returns to pre admission functional level  Description: INTERVENTIONS:  - Set and communicate daily mobility goal to care team and patient/family/caregiver     - Collaborate with rehabilitation services on mobility goals if consulted  - Out of bed for toileting  - Record patient progress and toleration of activity level   Outcome: Progressing     Problem: Nutrition/Hydration-ADULT  Goal: Nutrient/Hydration intake appropriate for improving, restoring or maintaining nutritional needs  Description: Monitor and assess patient's nutrition/hydration status for malnutrition  Collaborate with interdisciplinary team and initiate plan and interventions as ordered  Monitor patient's weight and dietary intake as ordered or per policy  Utilize nutrition screening tool and intervene as necessary  Determine patient's food preferences and provide high-protein, high-caloric foods as appropriate       INTERVENTIONS:  - Monitor oral intake, urinary output, labs, and treatment plans  - Assess nutrition and hydration status and recommend course of action  - Evaluate amount of meals eaten  - Assist patient with eating if necessary   - Allow adequate time for meals  - Recommend/ encourage appropriate diets, oral nutritional supplements, and vitamin/mineral supplements  - Order, calculate, and assess calorie counts as needed  - Recommend, monitor, and adjust tube feedings and TPN/PPN based on assessed needs  - Assess need for intravenous fluids  - Provide specific nutrition/hydration education as appropriate  - Include patient/family/caregiver in decisions related to nutrition  Outcome: Progressing

## 2023-06-04 NOTE — PROGRESS NOTES
"Internal Medicine Progress Note  Patient: Horace Lopez  Age/sex: 58 y o  male  Medical Record #: [id number]      ASSESSMENT/PLAN: (Interval History)  Horace Lopez is seen and examined and management for following issues:    Acute multifocal ischemic strokes/recurrent left hemispheric infarction  • Occurred in different arterial distributions  • IVY was negative for thrombus/PFO  • Felt not to be vasculitis and negative by a-gram 5/4/23  • Continue ASA/Brilinta/statin  • [facility] 5/29 = was abnormal with possible new infarcts after he felt his speech had declined   (Dr Frida Blanca had seen him 5/28 for this complaint and had normal exam and felt speech was at baseline)  • MRI brain 5/30 = some new areas of acute to subacute left parietal/occipital infarcts; +NPH noted  • CTH/CTA 5/31/23 = \"Subacute ischemia within the left hemisphere without mass effect or taiwo hemorrhagic transformation  Advanced chronic microangiopathic change within the brain parenchyma  Stable atherosclerotic disease of the right carotid bifurcation including the ICA origin and midportion of the bulb, approximately 60 to 70% at the point of maximal stenosis  A carotid stent has been placed on the left with only mild residual narrowing within the proximal internal carotid artery  Stable severe stenosis of the distal right M1 segment with decreased size and number of M2 branches\"    • Neurology added Pletal onto the ASA and Brilinta on 6/1/23 for recurrent left hemispheric infarction with unclear etio  • Will need to followup in [facility]PH clinic as OP  • S/p IVF 2/2 having CTA      Left ICA stenosis  • Was noted to have all of the CVAs in last month have been in left anterior circulation  • S/p PTA/stent 5/4/23  • Plan was to recheck carotid doppler 6 weeks and see NS but 2/2 inc expresive aphasia the above workup was done  • Continue AP/statin     Right ICA stenosis  • To followup with Vasc surgery as OP  • Continue AP/statin     LOOP recorder  • Was " placed 3/2019  • Neuro wants it to be replaced = for OP to Cardiology     Leukocytosis  • Had no fevers  • CXR and Procal were normal in the hospital  • resolved     HTN  • Home:  Toprol 100mg qd/Norvasc 10mg qd/Losartan 50mg qd/HCTZ 25mg qd/Hydralazine 25mg TID  • Here:  Toprol 100mg qd/Losartan 50mg qd  • stable     Pre DM  • HbA1C 5 8  • Takes Metformin at home but currently not on it in hospital  • Accuchecks were stable and dc'd in the hospital  • Monitor FBS with BMPs and continue DM diet  • FBS 92 on 6/1/23     Depression  • Continue Celexa as at home     Memory loss  • Continue Aricept as at home  • He has had memory issues since his CVA in 2019     Urine retention  • Has jo placed 5/8/23  • VT 5/18 failed and jo replaced by Urology 5/19/23 and is not to be removed  • Continue Flomax  • Found to have hematuria 6/3/23  Suspect traumatic cause  Continue irrigation through the weekend      Chronic LBP  • MRI showed advanced multilevel spondylosis, slightly progressed on the right at L2-3 compared to the prior study but otherwise stable  • Pain management per PMR     AAA  • Found on L-spine MRI  • Measures 3 4 cm  • OP followup     Hyponatremia  • Stopped HCTZ 5/16/23  • Resolved off of HCTZ and had been given IVF     Leg cramps  • Per PMR     UTI  • cx = >100,000 Enterococcus faecalis and 20,000-29,000 mixed contaminants x 2  • Has had no fever   •  [facility] started Keflex 5/30 --> switched to Levaquin 750mg qd x 5 on 6/1 based on sensitivities = d/w Dr Cayetano Miller     Thyroid nodules  • Found on CTA H/N  • Thyroid U/S 6/1/23 = right upper pole nodule 1 6 x 1 0 x 1 4 cm --> bx was recommended        Discharge date:  pending subacute rehab    The above assessment and plan was reviewed and updated as determined by my evaluation of the patient on 6/4/2023      Labs:   Results from last 7 days   Lab Units 06/01/23  0547   HEMATOCRIT % 36 0*   HEMOGLOBIN g/dL 12 4   PLATELETS Thousands/uL 252   WBC Thousand/uL 8 32 Results from last 7 days   Lab Units 06/01/23  0547   BUN mg/dL 15   CALCIUM mg/dL 9 1   CHLORIDE mmol/L 111*   CO2 mmol/L 24   CREATININE mg/dL 0 93   POTASSIUM mmol/L 3 6   SODIUM mmol/L 138                   Review of Scheduled Meds:  Current Facility-Administered Medications   Medication Dose Route Frequency Provider Last Rate   • acetaminophen  975 mg Oral TID PRN Aman Verdugo MD     • aspirin  81 mg Oral Daily Martinez Rabago, DO     • atorvastatin  40 mg Oral Daily Martinez Rabago, DO     • baclofen  5 mg Oral BID PRN Aman Verdugo MD     • bisacodyl  10 mg Rectal Daily PRN Martinez Rabago, DO     • calcium carbonate  1,000 mg Oral Daily PRN Martinez Rabago, DO     • cilostazol  100 mg Oral BID AC Aman Verdugo MD     • citalopram  20 mg Oral Daily Martinez Rabago, DO     • donepezil  5 mg Oral BID Martinez Rabago, DO     • heparin (porcine)  5,000 Units Subcutaneous [facility] Martinez Rabago, DO     • levofloxacin  750 mg Oral Q24H Aman Verdugo MD     • lidocaine  1 patch Topical Daily Martinez Rabago, DO     • lidocaine   Topical Q4H PRN Aman Verdugo MD     • losartan  50 mg Oral Daily Aman Verdugo MD     • menthol-methyl salicylate   Apply externally 4x Daily PRN Katarina Hurtado MD     • metoprolol succinate  100 mg Oral Daily Aman Verdugo MD     • ondansetron  4 mg Oral Q6H PRN Martinez Rabago, DO     • oxyCODONE  5 mg Oral Q6H PRN Martinez Rabago DO     • oxyCODONE  2 5 mg Oral Q6H PRN Martinez Rabago DO     • pantoprazole  20 mg Oral Early Morning Martinez Rabago, DO     • polyethylene glycol  17 g Oral QAM Martinez Rabago, DO     • senna  2 tablet Oral Daily PRN Martinez Rabago DO     • senna-docusate sodium  2 tablet Oral BID Martinez Rabago DO     • tamsulosin  0 8 mg Oral Daily With [address], CRNP     • ticagrelor  90 mg Oral Q12H [facility] Martinez Rabago, DO     • traZODone  50 mg Oral HS Aman Verdugo MD         Subjective/ HPI: Patient seen and examined  Patients overnight issues or events were reviewed with nursing or staff during rounds or morning huddle session  New or overnight issues include the following:     Pt seen in his room  He denies any current complaints  ROS:   A 10 point ROS was performed; negative except as noted above  Imaging:     US thyroid   Final Result by Arik Jones MD (06/01 1421)      The following meet current ACR criteria for recommending ultrasound guided biopsy:   - RIGHT upper pole nodule measuring 1 6 x 1 0 x 1 4 cm         Reference: ACR Thyroid Imaging, Reporting and Data System (TI-RADS): White Paper of the Workboard  J AM Aram Radiol 7319;18:938-251  (additional recommendations based on American Thyroid Association 2015 guidelines )      The study was marked in [facility] for immediate notification  Workstation performed: [id number]         CTA head and neck w wo contrast   Final Result by Reynaldo Monteiro DO (05/31 9441)      CT brain: Subacute ischemia within the left hemisphere without mass effect or taiwo hemorrhagic transformation  Advanced chronic microangiopathic change within the brain parenchyma  CT angiogram: Stable atherosclerotic disease of the right carotid bifurcation including the ICA origin and midportion of the bulb, approximately 60 to 70% at the point of maximal stenosis  A carotid stent has been placed on the left with only mild residual narrowing within the proximal internal carotid artery  Stable severe stenosis of the distal right M1 segment with decreased size and number of M2 branches  Workstation performed: [id number]         MRI brain wo contrast   Final Result by Tres Farrell MD (05/30 2238)      Similar areas of left parietal occipital recent ischemia/infarct  Some new areas of acute to subacute left parietal/occipital acute to subacute ischemia  Findings suggesting normal pressure hydrocephalus        Findings were marked as "\"immediate\"in Epic and will now be related to the ordering physician or covering clinical team by the radiology liaison  Workstation performed: [id number]         CT head wo contrast   Final Result by Silke Bergeron MD (05/30 0916)      Slight interval progression and extension of the nonhemorrhagic acute to early subacute left cerebral infarction noted on the recent MRI study  Mild regional sulcal crowding but no significant mass effect on the underlying ventricular system or evidence    of herniation  Redemonstrated disproportionate ventriculomegaly to sulcal prominence which can be seen in patients with normal pressure hydrocephalus  Background cerebral white matter changes consistent with moderate chronic microangiopathic disease  The study was marked in [facility] for immediate notification              Workstation performed: [id number]             *Labs /Radiology studies reviewed  *Medications reviewed and reconciled as needed  *Please refer to order section for additional ordered labs studies  *Case discussed with primary attending during morning huddle case rounds    Physical Examination:  Vitals:   Vitals:    06/03/23 1345 06/03/23 2047 06/04/23 0514 06/04/23 0958   BP: 148/64 138/64 137/59 130/62   BP Location: Right arm Right arm Right arm Right arm   Pulse: 82 75 70 82   Resp: 18 19 18    Temp: 98 6 °F (37 °C) 99 2 °F (37 3 °C) 98 2 °F (36 8 °C)    TempSrc: Oral Oral Oral    SpO2: 97% 97% 95%    Weight:           General Appearance: no distress, conversive  HEENT: PERRLA, conjuctiva normal; oropharynx clear; mucous membranes moist   Neck:  Supple, normal ROM  Lungs: CTA, normal respiratory effort, no retractions, expiratory effort normal  CV: regular rate and rhythm; no rubs/murmurs/gallops, PMI normal   ABD: soft; ND/NT; +BS  EXT: no edema  Skin: normal turgor, normal texture, no rashes  Psych: affect normal, mood normal  Neuro: Awake and alert  : [place] with pink urine    The above " physical exam was reviewed and updated as determined by my evaluation of the patient on 6/4/2023  Invasive Devices     Peripheral Intravenous Line  Duration           Peripheral IV 05/31/23 Left;Upper Arm 3 days          Drain  Duration           Urethral Catheter Coude 16 Fr  15 days                   VTE Pharmacologic Prophylaxis: Heparin  Code Status: Level 1 - Full Code  Current Length of Stay: 23 day(s)      Total time spent:  30 minutes with more than 50% spent counseling/coordinating care  Counseling includes discussion with patient re: progress  and discussion with patient of his/her current medical state/information  Coordination of patient's care was performed in conjunction with primary service  Time invested included review of patient's labs, vitals, and management of their comorbidities with continued monitoring  In addition, this patient was discussed with medical team including physician and advanced extenders  The care of the patient was extensively discussed and appropriate treatment plan was formulated unique for this patient  Medical decision making for the day was made by supervising physician unless otherwise noted in their attestation statement  ** Please Note:  voice to text software may have been used in the creation of this document   Although proof errors in transcription or interpretation are a potential of such software**

## 2023-06-04 NOTE — PROGRESS NOTES
06/04/23 0700   Pain Assessment   Pain Assessment Tool 0-10   Pain Score No Pain   Restrictions/Precautions   Precautions Aphasia; Aspiration;Bed/chair alarms;Cognitive; Fall Risk; [place]; Impulsive;Supervision on toilet/commode;Visual deficit   Eating   Type of Assistance Needed Supervision   Physical Assistance Level No physical assistance   Comment Sup with verbal cues when seated in tilt in space w/c with verbal cues   Eating CARE Score 4   Oral Hygiene   Type of Assistance Needed Supervision;Verbal cues   Physical Assistance Level No physical assistance   Comment Sup seated in w/c for brushing teeth at sinkside with verbal cues for sequencing and techniques  Oral Hygiene CARE Score 4   Shower/Bathe Self   Type of Assistance Needed Physical assistance   Physical Assistance Level 51%-75%   Comment   (Sup UB seated on shower chair in shower, LB seated Max A to complete with decreased forward reach, decreased functional reaching balance, and decreased overalll flexibility limiting IND )   Shower/Bathe Self CARE Score 2   Bathing   Assessed Bath Style Shower   Adaptive Equipment Tub Bench;Hand Held Shower   Tub/Shower Transfer   Limitations Noted In Balance; Endurance; Safety;LE Strength   Adaptive Equipment Grab Bars;Transfer Bench   Assessed Shower   Findings Min/Mod A sit pivot from w/c to shower bench   Upper Body Dressing   Type of Assistance Needed Supervision;Verbal cues   Physical Assistance Level No physical assistance   Comment Sup seated on shower bench   Upper Body Dressing CARE Score 4   Lower Body Dressing   Type of Assistance Needed Physical assistance   Physical Assistance Level Total assistance   Comment Min A with grab bar for standing with assist of additional person to don pants over hips for clothing management  Lower Body Dressing CARE Score 1   Lying to Sitting on Side of Bed   Type of Assistance Needed Supervision; Adaptive equipment   Physical Assistance Level No physical assistance   Comment Sup with bedrails   Lying to Sitting on Side of Bed CARE Score 4   Sit to Stand   Type of Assistance Needed Physical assistance   Physical Assistance Level 25% or less   Comment Min A with gait belt   Sit to Stand CARE Score 3   Bed-Chair Transfer   Type of Assistance Needed Physical assistance   Physical Assistance Level 26%-50%   Comment Mod A sit pivot transfers   Chair/Bed-to-Chair Transfer CARE Score 3   Cognition   Overall Cognitive Status Impaired   Arousal/Participation Alert; Cooperative   Attention Attends with cues to redirect   Orientation Level Oriented to person;Oriented to place;Oriented to situation   Memory Decreased short term memory;Decreased recall of recent events;Decreased recall of precautions   Following Commands Follows one step commands inconsistently   Assessment   Treatment Assessment Pt participated in 90 min OT session with fair activity tolerance with focus on ADL training  Pt seated supported in bed upon therapist entering room  Pt participated in supine to sit EOB at Sup level  Pt transferred from bed to w/c at [facility] A level sit pivot  Pt participated in showering routine in shower chair at Sup for UB, Max A LB  Pt participated in UB dressing at Sup level, LB Max A  Pt demonstrated decreased strength, decreased endurance, and decreased forward functional reach limiting IND in ADL showering routine  Pt able to brush teeth, and complete grooming tasks seated in w/c at sinkside with Sup  Pt participated in self-feeding activity at Sup level with decreased attention noted to R side with verbal cues needed to attend to food items to R hand side throughout feeding activity  Pt seated in tilt in space w/c with chair alarm on and all needs within reach at end of therapy session  Pt would benefit from continued OT services to progress pt with ADL tasks, RUE strength/coordination, standing tolerance/balance, and insigt/safety/judgement  Continue with established POC at this time     Prognosis Fair Problem List Decreased strength;Decreased range of motion;Decreased endurance; Impaired balance;Decreased mobility; Decreased cognition; Impaired judgement;Decreased safety awareness   Barriers to Discharge Inaccessible home environment;Decreased caregiver support   Plan   Treatment/Interventions ADL retraining;Functional transfer training;LE strengthening/ROM; Therapeutic exercise; Endurance training;Patient/family training;Equipment eval/education; Bed mobility; Compensatory technique education   Recommendation   OT Discharge Recommendation Post acute rehabilitation services  (SNF)   OT Therapy Minutes   OT Time In 0700   OT Time Out 0830   OT Total Time (minutes) 90   OT Mode of treatment - Individual (minutes) 90   OT Mode of treatment - Concurrent (minutes) 0   OT Mode of treatment - Group (minutes) 0   OT Mode of treatment - Co-treat (minutes) 0   OT Mode of Treatment - Total time(minutes) 90 minutes   OT Cumulative Minutes 1585   Therapy Time missed   Time missed?  No

## 2023-06-05 PROBLEM — D72.829 LEUKOCYTOSIS: Status: RESOLVED | Noted: 2023-05-12 | Resolved: 2023-06-05

## 2023-06-05 PROBLEM — I71.43 INFRARENAL ABDOMINAL AORTIC ANEURYSM (AAA) WITHOUT RUPTURE (HCC): Status: ACTIVE | Noted: 2023-05-08

## 2023-06-05 LAB
ANION GAP SERPL CALCULATED.3IONS-SCNC: 1 MMOL/L (ref 4–13)
BASOPHILS # BLD AUTO: 0.08 THOUSANDS/ÂΜL (ref 0–0.1)
BASOPHILS NFR BLD AUTO: 1 % (ref 0–1)
BUN SERPL-MCNC: 16 MG/DL (ref 5–25)
CALCIUM SERPL-MCNC: 9 MG/DL (ref 8.3–10.1)
CHLORIDE SERPL-SCNC: 110 MMOL/L (ref 96–108)
CO2 SERPL-SCNC: 24 MMOL/L (ref 21–32)
CREAT SERPL-MCNC: 1.05 MG/DL (ref 0.6–1.3)
EOSINOPHIL # BLD AUTO: 0.89 THOUSAND/ÂΜL (ref 0–0.61)
EOSINOPHIL NFR BLD AUTO: 10 % (ref 0–6)
ERYTHROCYTE [DISTWIDTH] IN BLOOD BY AUTOMATED COUNT: 14.6 % (ref 11.6–15.1)
GFR SERPL CREATININE-BSD FRML MDRD: 74 ML/MIN/1.73SQ M
GLUCOSE P FAST SERPL-MCNC: 102 MG/DL (ref 65–99)
GLUCOSE SERPL-MCNC: 102 MG/DL (ref 65–140)
HCT VFR BLD AUTO: 34.7 % (ref 36.5–49.3)
HGB BLD-MCNC: 12 G/DL (ref 12–17)
IMM GRANULOCYTES # BLD AUTO: 0.05 THOUSAND/UL (ref 0–0.2)
IMM GRANULOCYTES NFR BLD AUTO: 1 % (ref 0–2)
LYMPHOCYTES # BLD AUTO: 2.03 THOUSANDS/ÂΜL (ref 0.6–4.47)
LYMPHOCYTES NFR BLD AUTO: 23 % (ref 14–44)
MCH RBC QN AUTO: 33.2 PG (ref 26.8–34.3)
MCHC RBC AUTO-ENTMCNC: 34.6 G/DL (ref 31.4–37.4)
MCV RBC AUTO: 96 FL (ref 82–98)
MONOCYTES # BLD AUTO: 0.99 THOUSAND/ÂΜL (ref 0.17–1.22)
MONOCYTES NFR BLD AUTO: 11 % (ref 4–12)
NEUTROPHILS # BLD AUTO: 4.62 THOUSANDS/ÂΜL (ref 1.85–7.62)
NEUTS SEG NFR BLD AUTO: 54 % (ref 43–75)
NRBC BLD AUTO-RTO: 0 /100 WBCS
PLATELET # BLD AUTO: 256 THOUSANDS/UL (ref 149–390)
PMV BLD AUTO: 10.2 FL (ref 8.9–12.7)
POTASSIUM SERPL-SCNC: 3.5 MMOL/L (ref 3.5–5.3)
RBC # BLD AUTO: 3.61 MILLION/UL (ref 3.88–5.62)
SODIUM SERPL-SCNC: 135 MMOL/L (ref 135–147)
WBC # BLD AUTO: 8.66 THOUSAND/UL (ref 4.31–10.16)

## 2023-06-05 PROCEDURE — 97535 SELF CARE MNGMENT TRAINING: CPT

## 2023-06-05 PROCEDURE — 99232 SBSQ HOSP IP/OBS MODERATE 35: CPT | Performed by: INTERNAL MEDICINE

## 2023-06-05 PROCEDURE — 97112 NEUROMUSCULAR REEDUCATION: CPT

## 2023-06-05 PROCEDURE — 85025 COMPLETE CBC W/AUTO DIFF WBC: CPT | Performed by: NURSE PRACTITIONER

## 2023-06-05 PROCEDURE — 80048 BASIC METABOLIC PNL TOTAL CA: CPT | Performed by: NURSE PRACTITIONER

## 2023-06-05 PROCEDURE — 97530 THERAPEUTIC ACTIVITIES: CPT

## 2023-06-05 PROCEDURE — 99232 SBSQ HOSP IP/OBS MODERATE 35: CPT

## 2023-06-05 PROCEDURE — 92507 TX SP LANG VOICE COMM INDIV: CPT

## 2023-06-05 PROCEDURE — 97110 THERAPEUTIC EXERCISES: CPT

## 2023-06-05 RX ORDER — HEPARIN SODIUM 5000 [USP'U]/ML
5000 INJECTION, SOLUTION INTRAVENOUS; SUBCUTANEOUS EVERY 8 HOURS SCHEDULED
Qty: 1 ML | Refills: 0
Start: 2023-06-06

## 2023-06-05 RX ORDER — TAMSULOSIN HYDROCHLORIDE 0.4 MG/1
0.8 CAPSULE ORAL
Refills: 0
Start: 2023-06-06 | End: 2023-06-06

## 2023-06-05 RX ORDER — TRAZODONE HYDROCHLORIDE 50 MG/1
50 TABLET ORAL
Refills: 0
Start: 2023-06-06

## 2023-06-05 RX ORDER — CILOSTAZOL 100 MG/1
100 TABLET ORAL
Refills: 0
Start: 2023-06-06

## 2023-06-05 RX ORDER — BACLOFEN 5 MG/1
5 TABLET ORAL 2 TIMES DAILY PRN
Refills: 0
Start: 2023-06-05

## 2023-06-05 RX ORDER — ACETAMINOPHEN 325 MG/1
975 TABLET ORAL 3 TIMES DAILY PRN
Refills: 0
Start: 2023-06-05

## 2023-06-05 RX ADMIN — LIDOCAINE 5% 1 PATCH: 700 PATCH TOPICAL at 08:54

## 2023-06-05 RX ADMIN — TICAGRELOR 90 MG: 90 TABLET ORAL at 21:31

## 2023-06-05 RX ADMIN — HEPARIN SODIUM 5000 UNITS: 5000 INJECTION INTRAVENOUS; SUBCUTANEOUS at 13:10

## 2023-06-05 RX ADMIN — TICAGRELOR 90 MG: 90 TABLET ORAL at 08:58

## 2023-06-05 RX ADMIN — SENNOSIDES, DOCUSATE SODIUM 2 TABLET: 8.6; 5 TABLET ORAL at 08:54

## 2023-06-05 RX ADMIN — DONEPEZIL HYDROCHLORIDE 5 MG: 5 TABLET ORAL at 17:35

## 2023-06-05 RX ADMIN — CILOSTAZOL 100 MG: 100 TABLET ORAL at 15:33

## 2023-06-05 RX ADMIN — ATORVASTATIN CALCIUM 40 MG: 40 TABLET, FILM COATED ORAL at 08:54

## 2023-06-05 RX ADMIN — HEPARIN SODIUM 5000 UNITS: 5000 INJECTION INTRAVENOUS; SUBCUTANEOUS at 05:09

## 2023-06-05 RX ADMIN — METOPROLOL SUCCINATE 100 MG: 100 TABLET, EXTENDED RELEASE ORAL at 08:56

## 2023-06-05 RX ADMIN — TAMSULOSIN HYDROCHLORIDE 0.8 MG: 0.4 CAPSULE ORAL at 15:32

## 2023-06-05 RX ADMIN — TRAZODONE HYDROCHLORIDE 50 MG: 50 TABLET ORAL at 21:30

## 2023-06-05 RX ADMIN — PANTOPRAZOLE SODIUM 20 MG: 20 TABLET, DELAYED RELEASE ORAL at 05:10

## 2023-06-05 RX ADMIN — HEPARIN SODIUM 5000 UNITS: 5000 INJECTION INTRAVENOUS; SUBCUTANEOUS at 21:31

## 2023-06-05 RX ADMIN — DONEPEZIL HYDROCHLORIDE 5 MG: 5 TABLET ORAL at 08:55

## 2023-06-05 RX ADMIN — CILOSTAZOL 100 MG: 100 TABLET ORAL at 06:35

## 2023-06-05 RX ADMIN — LEVOFLOXACIN 750 MG: 750 TABLET, FILM COATED ORAL at 12:02

## 2023-06-05 RX ADMIN — CITALOPRAM HYDROBROMIDE 20 MG: 20 TABLET ORAL at 08:57

## 2023-06-05 RX ADMIN — ASPIRIN 81 MG: 81 TABLET, COATED ORAL at 08:54

## 2023-06-05 NOTE — PLAN OF CARE
Problem: PAIN - ADULT  Goal: Verbalizes/displays adequate comfort level or baseline comfort level  Description: Interventions:  - Encourage patient to monitor pain and request assistance  - Assess pain using appropriate pain scale  - Administer analgesics based on type and severity of pain and evaluate response  - Implement non-pharmacological measures as appropriate and evaluate response  - Consider cultural and social influences on pain and pain management  - Notify physician/advanced practitioner if interventions unsuccessful or patient reports new pain  Outcome: Progressing     Problem: INFECTION - ADULT  Goal: Absence or prevention of progression during hospitalization  Description: INTERVENTIONS:  - Assess and monitor for signs and symptoms of infection  - Monitor lab/diagnostic results  - Monitor all insertion sites, i e  indwelling lines, tubes, and drains  - Monitor endotracheal if appropriate and nasal secretions for changes in amount and color  - [place] appropriate cooling/warming therapies per order  - Administer medications as ordered  - Instruct and encourage patient and family to use good hand hygiene technique  - Identify and instruct in appropriate isolation precautions for identified infection/condition  Outcome: Progressing  Goal: Absence of fever/infection during neutropenic period  Description: INTERVENTIONS:  - Monitor WBC    Outcome: Progressing     Problem: SAFETY ADULT  Goal: Patient will remain free of falls  Description: INTERVENTIONS:  - Educate patient/family on patient safety including physical limitations  - Instruct patient to call for assistance with activity   - Consult OT/PT to assist with strengthening/mobility   - Keep Call bell within reach  - Keep bed low and locked with side rails adjusted as appropriate  - Keep care items and personal belongings within reach  - Initiate and maintain comfort rounds  - Make Fall Risk Sign visible to staff  - Offer Toileting every 2 Hours, in advance of need  - Initiate/Maintain bed alarm  - Obtain necessary fall risk management equipment: bed alarm  - Apply yellow socks and bracelet for high fall risk patients  - Consider moving patient to room near nurses station  Outcome: Progressing  Goal: Maintain or return to baseline ADL function  Description: INTERVENTIONS:  -  Assess patient's ability to carry out ADLs; assess patient's baseline for ADL function and identify physical deficits which impact ability to perform ADLs (bathing, care of mouth/teeth, toileting, grooming, dressing, etc )  - Assess/evaluate cause of self-care deficits   - Assess range of motion  - Assess patient's mobility; develop plan if impaired  - Assess patient's need for assistive devices and provide as appropriate  - Encourage maximum independence but intervene and supervise when necessary  - Involve family in performance of ADLs  - Assess for home care needs following discharge   - Consider OT consult to assist with ADL evaluation and planning for discharge  - Provide patient education as appropriate  Outcome: Progressing  Goal: Maintains/Returns to pre admission functional level  Description: INTERVENTIONS:  - Perform BMAT or MOVE assessment daily    - Set and communicate daily mobility goal to care team and patient/family/caregiver  - Collaborate with rehabilitation services on mobility goals if consulted  - Perform Range of Motion 3 times a day  - Reposition patient every 2 hours    - Dangle patient 3 times a day  - Stand patient 3 times a day  - Ambulate patient 3 times a day  - Out of bed to chair 3 times a day   - Out of bed for meals 3 times a day  - Out of bed for toileting  - Record patient progress and toleration of activity level   Outcome: Progressing     Problem: DISCHARGE PLANNING  Goal: Discharge to home or other facility with appropriate resources  Description: INTERVENTIONS:  - Identify barriers to discharge w/patient and caregiver  - Arrange for needed discharge resources and transportation as appropriate  - Identify discharge learning needs (meds, wound care, etc )  - Arrange for interpretive services to assist at discharge as needed  - Refer to Case Management Department for coordinating discharge planning if the patient needs post-hospital services based on physician/advanced practitioner order or complex needs related to functional status, cognitive ability, or social support system  Outcome: Progressing     Problem: Prexisting or High Potential for Compromised Skin Integrity  Goal: Skin integrity is maintained or improved  Description: INTERVENTIONS:  - Identify patients at risk for skin breakdown  - Assess and monitor skin integrity  - Assess and monitor nutrition and hydration status  - Monitor labs   - Assess for incontinence   - Turn and reposition patient  - Assist with mobility/ambulation  - Relieve pressure over bony prominences  - Avoid friction and shearing  - Provide appropriate hygiene as needed including keeping skin clean and dry  - Evaluate need for skin moisturizer/barrier cream  - Collaborate with interdisciplinary team   - Patient/family teaching  - Consider wound care consult   Outcome: Progressing     Problem: MOBILITY - ADULT  Goal: Maintain or return to baseline ADL function  Description: INTERVENTIONS:  -  Assess patient's ability to carry out ADLs; assess patient's baseline for ADL function and identify physical deficits which impact ability to perform ADLs (bathing, care of mouth/teeth, toileting, grooming, dressing, etc )  - Assess/evaluate cause of self-care deficits   - Assess range of motion  - Assess patient's mobility; develop plan if impaired  - Assess patient's need for assistive devices and provide as appropriate  - Encourage maximum independence but intervene and supervise when necessary  - Involve family in performance of ADLs  - Assess for home care needs following discharge   - Consider OT consult to assist with ADL evaluation and planning for discharge  - Provide patient education as appropriate  Outcome: Progressing  Goal: Maintains/Returns to pre admission functional level  Description: INTERVENTIONS:  - Perform BMAT or MOVE assessment daily    - Set and communicate daily mobility goal to care team and patient/family/caregiver  - Collaborate with rehabilitation services on mobility goals if consulted  - Perform Range of Motion 3 times a day  - Reposition patient every 2 hours  - Dangle patient 3 times a day  - Stand patient 3 times a day  - Ambulate patient 3 times a day  - Out of bed to chair 3 times a day   - Out of bed for meals 3 times a day  - Out of bed for toileting  - Record patient progress and toleration of activity level   Outcome: Progressing     Problem: Nutrition/Hydration-ADULT  Goal: Nutrient/Hydration intake appropriate for improving, restoring or maintaining nutritional needs  Description: Monitor and assess patient's nutrition/hydration status for malnutrition  Collaborate with interdisciplinary team and initiate plan and interventions as ordered  Monitor patient's weight and dietary intake as ordered or per policy  Utilize nutrition screening tool and intervene as necessary  Determine patient's food preferences and provide high-protein, high-caloric foods as appropriate       INTERVENTIONS:  - Monitor oral intake, urinary output, labs, and treatment plans  - Assess nutrition and hydration status and recommend course of action  - Evaluate amount of meals eaten  - Assist patient with eating if necessary   - Allow adequate time for meals  - Recommend/ encourage appropriate diets, oral nutritional supplements, and vitamin/mineral supplements  - Order, calculate, and assess calorie counts as needed  - Recommend, monitor, and adjust tube feedings and TPN/PPN based on assessed needs  - Assess need for intravenous fluids  - Provide specific nutrition/hydration education as appropriate  - Include patient/family/caregiver in decisions related to nutrition  Outcome: Progressing

## 2023-06-05 NOTE — PROGRESS NOTES
"   06/05/23 0700   Pain Assessment   Pain Assessment Tool 0-10   Pain Score No Pain   Restrictions/Precautions   Precautions Aphasia; Aspiration;Bed/chair alarms;Cognitive; Fall Risk;Supervision on toilet/commode;Visual deficit   Weight Bearing Restrictions No   ROM Restrictions No   Lifestyle   Autonomy \"I'm pretty tired now\" referring to sitting EOB during ADL   Eating   Type of Assistance Needed Supervision;Verbal cues   Physical Assistance Level No physical assistance   Comment supervision to scan meal tray to locate items on R side; pt demo's some insight when using R UE functionally as pt slowly picks up coffee cup with R hand ensuring he has a good  prior to drinking, then places back down on tray slowly ensuring no spillage  set up required for applying cream cheese to bagel  Eating CARE Score 4   Oral Hygiene   Type of Assistance Needed Supervision;Verbal cues   Physical Assistance Level No physical assistance   Comment seated in w/c at sink, pt requires inc time and max VC/TC for applying tooth paste to tooth brush   Oral Hygiene CARE Score 4   Shower/Bathe Self   Type of Assistance Needed Physical assistance   Physical Assistance Level 51%-75%   Comment seated EOB to complete sponge bathing routine, pt bathes UB with supervision and VCs for thoroughness, decreased awareness when pt dropped wash cloth, etc ; pt able to bathe upper legs, requires assist for lower legs, feet and rear  Shower/Bathe Self CARE Score 2   Upper Body Dressing   Type of Assistance Needed Physical assistance   Physical Assistance Level 25% or less   Comment assist to locate and thread RUE through arm hole, able to thread head and LUE and pull down over trunk  Upper Body Dressing CARE Score 3   Lower Body Dressing   Type of Assistance Needed Physical assistance   Physical Assistance Level Total assistance   Comment TA for donning brief   assist to thread LEs and jo bag into shorts, Ax2 in stance for CM as pt noted with " retropulsion despite max VC/TC  Lower Body Dressing CARE Score 1   Putting On/Taking Off Footwear   Type of Assistance Needed Physical assistance   Physical Assistance Level 76% or more   Comment pt able to doff L sock with ext time; requires assist for all other parts of footwear  Putting On/Taking Off Footwear CARE Score 2   Sit to Stand   Type of Assistance Needed Physical assistance;Verbal cues   Physical Assistance Level 26%-50%   Comment from EOB to verónica walker with gait belt donned; pt noted with retropulsion this session   Sit to Stand CARE Score 3   Bed-Chair Transfer   Type of Assistance Needed Physical assistance;Verbal cues   Chair/Bed-to-Chair Transfer CARE Score -   Exercise Tools   Exercise Tools Yes   Other Exercise Tool 1 to inc overall strength and coordination, pt uses green flex bar for bimanual strength/use, completing forearm pronation/supination, completing 3x10 for each with rest breaks as needed to manage fatigue  Cognition   Overall Cognitive Status Impaired   Arousal/Participation Alert; Cooperative   Attention Attends with cues to redirect   Orientation Level Oriented to person;Oriented to place;Oriented to situation   Memory Decreased short term memory;Decreased recall of recent events;Decreased recall of precautions   Following Commands Follows one step commands with increased time or repetition   Activity Tolerance   Activity Tolerance Patient tolerated treatment well   Assessment   Treatment Assessment pt engages in 90 minute skilled OT session focusing on ADL routine, func transfers and standing daxa/bal with verónica walker  see above for full func details  pt continues to require, at times, max VC/TC during self care and transfers due to visual impairments  continues to demo slow progress with func transfers with use of verónica walker, max VC for sequencing of steps during SPT (and gait belt donned)   pt remains limited by impaired vision, func cog, safety, insight, balance, strength, coordination, warranting continued skilled care to focus on ADL retraining, func transfers, standing daxa/bal, func cog/vision, RUE NMR, in order to decrease burden of care at d/c  Prognosis Fair   Problem List Decreased strength;Decreased endurance; Impaired balance;Decreased mobility; Decreased coordination;Decreased cognition; Impaired judgement;Decreased safety awareness; Impaired vision   Barriers to Discharge Inaccessible home environment;Decreased caregiver support   Plan   Treatment/Interventions ADL retraining;Functional transfer training; Therapeutic exercise; Endurance training;Cognitive reorientation;Patient/family training;Equipment eval/education; Compensatory technique education   OT Therapy Minutes   OT Time In 0700   OT Time Out 0830   OT Total Time (minutes) 90   OT Mode of treatment - Individual (minutes) 90   OT Mode of treatment - Concurrent (minutes) 0   OT Mode of treatment - Group (minutes) 0   OT Mode of treatment - Co-treat (minutes) 0   OT Mode of Treatment - Total time(minutes) 90 minutes   OT Cumulative Minutes 1675   Therapy Time missed   Time missed?  No

## 2023-06-05 NOTE — PROGRESS NOTES
"   23 1300   Pain Assessment   Pain Assessment Tool 0-10   Pain Score No Pain   Restrictions/Precautions   Precautions Aphasia; Aspiration;Bed/chair alarms;Cognitive; Fall Risk;Impulsive;Supervision on toilet/commode;Visual deficit   Comprehension   Comprehension (FIM) 3 - Understands basic info/conversation 50-74% of time   Expression   Expression (FIM) 3 - Expresses basic info/needs 50-74% of time   Social Interaction   Social Interaction (FIM) 5 - Interacts appropriately with others 90% of time   Problem Solving   Problem solving (FIM) 3 - Solves basic problmes 50-74% of time   Memory   Memory (FIM) 3 - Recognizes, recalls/performs 50-74%   Speech/Language/Cognition Assessment   Treatment Assessment Pt participated in skilled ST session focusing on expressive and receptive language skills  Pt began session attempted to convey a message to SLP  Given increased time and some listener burden of determining message, pt was able to express that he feels his speech when \"talking\" has significantly improved when he uses the strategies of slowing down and talking in a lower volume  Pt then engaged in the following tasks:     Expression of Orientation and Biographical Info:  Pt accurately stated his name, full address, wife's names and children's names  Pt with significant difficulty in stating his  and age, requiring overall max cuing consisting of multiple choice options, phonemic cues, visual options and verbal models in which he was then able to repeat info  For orientation, pt appeared to become more frustrated with this info, but was able to accurately stated his name, place and situation  Pt remains with increased difficulty conveying the current month and year, despite various verbal and phonemic cues  Required visual multiple choice options  Reading Comprehension:  Attempted to engage pt in a reading comprehension task of greater difficulty as he had been doing well with single word comprehension   " Presented with a written sentence completion task of basic/short sentences, along with [facility] written word choices, pt demo significant difficulty and required overall max to total assist with task for first few trials  Task was then stopped and this was too difficulty at this time  Plan to trial additional lower level reading comprehension tasks in the future  Verbal Expression:  Engaged in a verbal sentence completion task, pt independently stated appropriate words which fit the sentence for 14/20 trials  Pt benefited from verbal and gestural cues to improve word retrieval, but more so phonemic cues to complete remaining trials during which he presented with blocks and hesitations/initiation of attempting to provide responses  Verbally given a basic/concrete concept category, pt then verbalized words belonging to categories for 9/15 opportunities, independently  A combination of moderate level verbal cues, phrase completion cues and phonemic cues were utilized to facilitate improved word retrieval during task  Additionally, pt engaged in a word deduction task when given descriptions of items, consisting of 2 short phrases  Pt determined the described item independently for 2/5 trials  Pt noted to have greater difficulty with this task in which he also made multiple attempts at correctly producing target words during both correct trials  Required phonemic cues and phrase completion cues to improve word finding for remaining trials  During this task, pt appeared to present with more apraxia like errors vs word finding difficulties  He required increased time to elicit all responses and attempts at verbal output  Pt also noted to have decreasing frustration tolerance by this time, which was towards the end of session  However, during informal conversation, which pt appeared more at ease, pt's speech was more fluent and more effective in conveying semantic content   Though pt did remain with hesitations, times of blocks, and difficulty with word retrieval, pt was more effective in his communication attempts with the listener burden reduced in comparison to previous sessions  Throughout session and informal convo, SLP did continue to cue pt for slower rate and to take pauses/take breaths in order to maintain a slower rate  Overall, pt continues to present with expressive>receptive language deficits/aphasia at this time  At this time, pt is recommended for further skilled SLP services during acute rehab stay in order to improve both expressive/receptive language skills and to maximize overall functional communication abilities  SLP Therapy Minutes   SLP Time In 1300   SLP Time Out 1400   SLP Total Time (minutes) 60   SLP Mode of treatment - Individual (minutes) 60   SLP Mode of treatment - Concurrent (minutes) 0   SLP Mode of treatment - Group (minutes) 0   SLP Mode of treatment - Co-treat (minutes) 0   SLP Mode of Treatment - Total time(minutes) 60 minutes   SLP Cumulative Minutes 4255   Therapy Time missed   Time missed?  No

## 2023-06-05 NOTE — PROGRESS NOTES
"Physical Medicine and Rehabilitation Progress Note  Roslyn Husbands 58 y o  male MRN: [id number]  Unit/Bed#: -01 Encounter: [id number]      Assessment & Plan:     Decline in ADLs and mobility: Functional assessment - stable        FIM  Care Score  Admit Score Recent Score    Total assist  1-100% or 2p    Tot ADLs LBD   Max assist 2-51-75%    Sub   Bathing   Mod assist 3- 26-50%  Par     Min assist 3- 25% or < Par  UBD   CG assist 4  TA     Sup/Setup 4-5 Sup     Mod-I/Indep 6 MI      Transfers  Total assist  Mod-total assist     Ambulation   Total assist  Total assist     Stairs   Total assist/NT      Goal: CGA-supervision for most ADLs and  for mobility  Major barriers:  Impaired cognition, speech, balance, deconditioning  Dispo: SNF likely tomorrow - pending      * Stroke [facility])  Assessment & Plan  6/5 Neuro exam stable   - continue to monitor neuro exam, vitals closely with possible recent new stroke   6/1 Neuro exam still stable again s/p recent possible new recent CVA v evolving ischemia  Neuro f/u 6/1 - \"additional ischemia within the L parieto-occipital region consistent with the area of prior ischemia  Case was further discussed with Neuro IR as well  CTA was reviewed and stent appears patent with only mild stenosis present  Recurrent L hemispheric infarction in the setting of multiple prior anterior circulation infarcts and longstanding b/l intracranial ICA disease now s/p L ICA stenting, which is patent  This could represent evolving ischemia from his prior insult or perhaps new injury - this is unclear  Given he has failed multiple prior medical trials, feel benefit of maximizing his medical regimen as much is possible outweighs potential risk  We will add Pletal at this point to his ASA and Brillinta  5/31 - CTA H/N - CT brain: Subacute ischemia within the left hemisphere without mass effect or taiwo hemorrhagic transformation  Advanced chronic microangiopathic change within the brain parenchyma   " CT angiogram: Stable atherosclerotic disease of the right carotid bifurcation including the ICA origin and midportion of the bulb, approximately 60 to 70% at the point of maximal stenosis  A carotid stent has been placed on the left with only mild residual narrowing within the proximal internal carotid artery  Stable severe stenosis of the distal right M1 segment with decreased size and number of M2 branches  5/30 - MRI Brain - Similar areas of left parietal occipital recent ischemia/infarct  Some new areas of acute to subacute left parietal/occipital acute to subacute ischemia  Findings suggesting normal pressure hydrocephalus  5/29 - patient reported feeling off that may have started 5/28 - like he did when he had prior CVA with some worsening speech but gross neuro exam fairly stable; felt more tired   5/29 - CTH -  Slight interval progression and extension of the nonhemorrhagic acute to early subacute left cerebral infarction noted on the recent MRI study  Mild regional sulcal crowding but no significant mass effect on the underlying ventricular system or evidence of herniation  Redemonstrated disproportionate ventriculomegaly to sulcal prominence which can be seen in patients with normal pressure hydrocephalus  Background cerebral white matter changes consistent with moderate chronic microangiopathic disease  Recent multifocal ischemic infarcts in different distributions of unclear etiology   · Imaging revealed multifocal strokes; underwent L ICA PTA/stenting 5/4 also hx of R ICA stenosis   · Seen by neuro and NSx and eventually switched from his Eliquis/aspirin to aspirin and Sheria Lamont was felt not to be cardioembolic by neurosurgery  Wayne Going was a question of vasculitis as the etiology but Neurosurgery saw him in consult and felt that it was likely related to atherosclerotic and carotid disease and they stopped the Eliquis and placed back on ASA     Neuro did not feel he needed to go back on full A/C as well  · Has loop recorder placed 2019 and neuro wants OP replacement of loop recorder - OP cards f/u   · CT of the chest abdomen pelvis without evidence of malignancy done on the last admission on 4/17  · A-gram not c/w vasculitis   · Thrombosis panel done in 04/18/2023 that was only abnl for AT3 that was low in the acute setting and needs to be repeated in future  Repeat OP thrombosis panel thru neuro or hematology  · Recent IVY with no PFO  · Previous TTE 04/18/23 showed ED of 55% and nl wall motion and mildly dilated L atrium  · Brilinta, aspirin, and now Pletal 100mg BID (starting 6/2) as well as statin for secondary stroke prophylaxis   · Continue Physical therapy, Occupational Therapy, speech therapy  · Monitor neuro exam closely       Muscle spasms of both lower extremities  Assessment & Plan  Significant at times; reminded him to use PRN med if needed   Has had chronically since prior CVA but significantly worse recently; strength stable; possible UTI  - Treat possible UTI  - Switched PRN Robaxin to Baclofen 5mg BID PRN - monitor cognition > if needed may need to schedule    Bilateral carotid artery stenosis  Assessment & Plan  · Status post left ICA angioplasty and stent placement neurovascular service  · Neuro c/s and f/u during ARC course - they also spoke with NSx   · Per IM- S/p L ICA PTA/stent 5/4/23; Recheck carotid doppler 6 weeks and see NS - has OP follow-up with Dr Dipika Rice 6/26  · Brilinta, aspirin, pletal and statin  · BP mgmt per IM here  · OP vasc sx follow-up      Memory deficits  Assessment & Plan  · Hx of multiple strokes and some cog deficits and residual aphasia/apraxia  · He does still have some aphasia and apraxia which does limit some history taking although with time basic communication is fairly adequately achieved    · Neuropsych eval 6/1  · However, patient appeared to understand the concept of Power of  and on three occasions clearly stated that his wife serve in this capacity  During this encounter, patient appears to have capacity to make informed decisions regarding the appointment of POA    · I am in agreement that in spite of patient having some cog deficits he seems to understanding and appreciate concept of POA and his needs and options regarding this and elects to choose his wife with acceptable level of capacity for this specific issue at this time  · Started on Aricept due to memory difficulties after stroke in 2019  · Currently on 5 mg twice daily  · Sees Dr Annabelle Dolan in outpatient neurology    Anxiety and depression  Assessment & Plan  Mood stable   Continues to sleep better, tolerating med regimen  Anxiety, depression, significant insomnia with difficulty adjusting   Neuropsych c/s 5/22  Adjustment Disorder with Anxiety and Depression  R/O Post Stroke Depression  • Supportive psychotherapy, utilizing CBT and mindfulness strategies  • DBT distress tolerance techniques  to improve coping and mood  • Meditation and relaxation training  Continue chronic home Celexa 20mg qday   Sleep impaired on lower dose Trazodone > increase back to 50mg at bedtime  - Monitor for excessive serotonin - not appreciable thus far    -Monitor for signs and symptoms of excessive serotonin  -Monitor mood, efficacy, tolerance     Urinary retention  Assessment & Plan  - Recommend voiding trial 6/5 - if unsuccessful would re-insert jo prior to d/c to SNF   - No clots/hematuria today    -Adjusted Abx to cover Enterococcus - IM recommends Levaquin 750mg qday x5 d thru 6/5 to cover complicated UTI; leukocytosis resolved   - Urology c/s 5/19 for ongoing and recurrent retention as well as difficulty to place jo  - Continue flomax to 0 8mg qday  - Recent possible new CVA; still fair amount of immobility; UC showing organism not covered but recent Abx and patient with significant LE spasms   - Monitor for infection   - Optimal bowel mgmt, mobilize  - OP urology follow-up       Multiple thyroid nodules  Assessment & Plan  Incidental finding on CTA 6/1  SOFT TISSUES OF THE NECK: Several hypodense nodules are scattered within the thyroid gland  The largest of these is located on the right measuring 1 5 cm in craniocaudad dimension  This has gradually increased in size when compared with prior examination;  from 2019  Incidental discovery of one or more thyroid nodule(s) measuring more than 1 5 cm and without suspicious features is noted in this patient who is above 28years old; according to guidelines published in the February 2015 white paper on incidental thyroid nodules in the Journal of the [employer] of Radiology Sandralavelle Gallagher), further characterization with thyroid ultrasound is recommended  - Thyroid US 6/1  FINDINGS:  Normal homogeneous smooth echotexture      Right lobe: 5 1 x 2 2 x 2 1 cm  Volume 11 5 mL  Left lobe:  4 5 x 2 1 x 1 2 cm  Volume 5 4 mL  Isthmus: 0 3  cm      Nodule #1  Image 25  RIGHT upper pole nodule measuring 1 6 x 1 0 x 1 4 cm  No priors for comparison  COMPOSITION:  2 points, solid or almost completely solid   ECHOGENICITY:  2 points, hypoechoic  SHAPE:  0 points, wider-than-tall  MARGIN: 0 points, smooth  ECHOGENIC FOCI:  0 points, none or large comet-tail artifacts  TI-RADS Classification: TR 4 (4-6 points),  FNA if > 1 5 cm  Follow if > 1cm      Nodule #2  Image 30  RIGHT lower pole nodule measuring 1 5 x 0 9 x 1 5 cm  No priors for comparison  COMPOSITION:  1 point, mixed cystic and solid  ECHOGENICITY:  1 point, hyperechoic or isoechoic  SHAPE:  0 points, wider-than-tall  MARGIN: 0 points, smooth  ECHOGENIC FOCI:  0 points, none or large comet-tail artifacts  TI-RADS Classification: TR 2 (2 points), Not suspicious  No FNA      Additional cystic nodules do not meet criteria for follow-up             IMPRESSION:  The following meet current ACR criteria for recommending ultrasound guided biopsy:  - RIGHT upper pole nodule measuring 1 6 x 1 0 x 1 4 cm  - Discussed typing of US bx and IM recommends after d/c from [facility]    Leukocytosis  Assessment & Plan  · Resolved 6/1  · Comgmt with IM   · Monitor for s/s of infection or other etiologies; monitor vitals/labs   · Starting Empiric Keflex for questionable UTI     Hypertriglyceridemia  Assessment & Plan  Continue statin    Primary hypertension  Assessment & Plan  · Adequate control  · Mgmt per IM:  metoprolol succinate 100 mg daily as well as Cozaar 50 mg daily   · Hctz stopped    GERD (gastroesophageal reflux disease)  Assessment & Plan  Continue pantoprazole and as needed Tums    At risk for venous thromboembolism (VTE)  Assessment & Plan  SCDs, ambulation, and heparin       Possible NPH (normal pressure hydrocephalus) ([facility] [address] )  Assessment & Plan  5/30 - MRI Brain - Similar areas of left parietal occipital recent ischemia/infarct  Some new areas of acute to subacute left parietal/occipital acute to subacute ischemia  Findings suggesting normal pressure hydrocephalus    Neuro recommends OP NPH clinic after d/c    Abnormal laboratory test  Assessment & Plan  AT3 89% on 4/2023 lab per prior providers; can be low from recent stroke and not true AT3 def  - Recommend follow-up with neurology and repeat thrombosis panel as an outpatient     Hypokalemia  Assessment & Plan  · Remains improved    Chronic ischemic left MCA stroke  Assessment & Plan  · Recent left MCA stroke in the beginning of April at 4/16/2023 with aphasia and was empirically treated with Eliquis 5 mg twice daily as well as aspirin 81 mg due to embolic stroke of undetermined source per prior documentation  · Despite this he presented with additional strokes for this admission > see mgmt above     History of tobacco use  Assessment & Plan  · Patient with a history of tobacco use yet quit in 2019  · Does not need nicotine patches    Prediabetes  Assessment & Plan  · Last hemoglobin A1c of 5 8  · Mgmt per IM during ARC   · Started on consistent carbohydrate 3 diet here "in addition to cardiac diet on admission to [facility]    Chronic low back pain  Assessment & Plan  · Adequate control  · APAP TID PRN - discussed with pt   · Robaxin 500mg Q6H PRN spasms   · Oxy 2 5-5mg Q6H PRN   · Continue lidocaine patch and aqua K-pad but not in the same area at the same time    Chronic ischemic right MCA stroke  Assessment & Plan  · Patient with history of right MCA stroke back in March 2019 status post thrombectomy  · Had loop recorder placement without overt arrhythmia and was on Plavix 75 mg at that time  Also with known severe right M1 stenosis  · See \"Stroke\" mgmt above         Other Medical Issues:  • Monitor for     Follow-up providers and other issues to be followed up after discharge  PCP  Neuro  Cards  Vasc Sx   NSx - NPH clinic/interventional      CODE: Level 1: Full Code    Restrictions include: Fall precautions    Objective: Allergies per EMR  Diagnostic Studies: Reviewed   US thyroid   Final Result by Karyna Alberts MD (06/01 1421)      The following meet current ACR criteria for recommending ultrasound guided biopsy:   - RIGHT upper pole nodule measuring 1 6 x 1 0 x 1 4 cm         Reference: ACR Thyroid Imaging, Reporting and Data System (TI-RADS): White Paper of the 3sun  J AM Aram Radiol 5998;26:704-786  (additional recommendations based on American Thyroid Association 2015 guidelines )      The study was marked in [facility] for immediate notification  Workstation performed: [id number]         CTA head and neck w wo contrast   Final Result by Reynaldo Dickerson DO (05/31 6441)      CT brain: Subacute ischemia within the left hemisphere without mass effect or taiwo hemorrhagic transformation  Advanced chronic microangiopathic change within the brain parenchyma        CT angiogram: Stable atherosclerotic disease of the right carotid bifurcation including the ICA origin and midportion of the bulb, approximately 60 to 70% at the point of maximal " "stenosis  A carotid stent has been placed on the left with only mild residual narrowing within the proximal internal carotid artery  Stable severe stenosis of the distal right M1 segment with decreased size and number of M2 branches  Workstation performed: [id number]         MRI brain wo contrast   Final Result by Val Vigil MD (05/30 2337)      Similar areas of left parietal occipital recent ischemia/infarct  Some new areas of acute to subacute left parietal/occipital acute to subacute ischemia  Findings suggesting normal pressure hydrocephalus  Findings were marked as \"immediate\"in Epic and will now be related to the ordering physician or covering clinical team by the radiology liaison  Workstation performed: [id number]         CT head wo contrast   Final Result by Jayshree Oreilly MD (05/30 1943)      Slight interval progression and extension of the nonhemorrhagic acute to early subacute left cerebral infarction noted on the recent MRI study  Mild regional sulcal crowding but no significant mass effect on the underlying ventricular system or evidence    of herniation  Redemonstrated disproportionate ventriculomegaly to sulcal prominence which can be seen in patients with normal pressure hydrocephalus  Background cerebral white matter changes consistent with moderate chronic microangiopathic disease  The study was marked in [facility] for immediate notification              Workstation performed: [id number]             See above as well    Laboratory: Labs reviewed  Results from last 7 days   Lab Units 06/05/23  0507 06/01/23  0547   HEMATOCRIT % 34 7* 36 0*   HEMOGLOBIN g/dL 12 0 12 4   WBC Thousand/uL 8 66 8 32     Results from last 7 days   Lab Units 06/05/23  0507 06/01/23  0547   BUN mg/dL 16 15   CHLORIDE mmol/L 110* 111*   CREATININE mg/dL 1 05 0 93   POTASSIUM mmol/L 3 5 3 6            Drug regimen reviewed, all potential adverse effects identified and " addressed:    Current Facility-Administered Medications   Medication Dose Route Frequency Provider Last Rate   • acetaminophen  975 mg Oral TID PRN Meche Joseph MD     • aspirin  81 mg Oral Daily Teresajun Meyer, DO     • atorvastatin  40 mg Oral Daily Teresa Mitch, DO     • baclofen  5 mg Oral BID PRN Meche Joseph MD     • bisacodyl  10 mg Rectal Daily PRN Teresa Meyer, DO     • calcium carbonate  1,000 mg Oral Daily PRN Teresa Meyer, DO     • cilostazol  100 mg Oral BID AC Meche Joseph MD     • citalopram  20 mg Oral Daily Teresa Mitch, DO     • donepezil  5 mg Oral BID Teresa Meyer, DO     • heparin (porcine)  5,000 Units Subcutaneous [facility] Teresaarash Meyer, DO     • lidocaine  1 patch Topical Daily Teresa Meyer, DO     • lidocaine   Topical Q4H PRN Meche Joseph MD     • losartan  50 mg Oral Daily Meche Joseph MD     • menthol-methyl salicylate   Apply externally 4x Daily PRN Nolan Jackson MD     • metoprolol succinate  100 mg Oral Daily Meche Joseph MD     • ondansetron  4 mg Oral Q6H PRN Teresa Meyer, DO     • oxyCODONE  5 mg Oral Q6H PRN Teresa Meyer, DO     • oxyCODONE  2 5 mg Oral Q6H PRN Teresa Meyer, DO     • pantoprazole  20 mg Oral Early Morning Teresa Meyer, DO     • polyethylene glycol  17 g Oral QAM Teresa Meyer, DO     • senna  2 tablet Oral Daily PRN Teresa Meyer, DO     • senna-docusate sodium  2 tablet Oral BID Teresajun Meyer, DO     • tamsulosin  0 8 mg Oral Daily With [address], CRNP     • ticagrelor  90 mg Oral Q12H [address] Teresajun Meyer, DO     • traZODone  50 mg Oral HS Meche Joseph MD         •  acetaminophen  •  baclofen  •  bisacodyl  •  calcium carbonate  •  lidocaine  •  menthol-methyl salicylate  •  ondansetron  •  oxyCODONE  •  oxyCODONE  •  senna          Chief Complaints:  Rehab follow-up     Subjective: On eval, patient reports still some spasms but not as bad as last week    He reports sleeping "adequately  He feels like he is doing better in therapy  He denies abdominal pain, fever, chills, shortness of breath, worsening strength or other new complaints  ROS: A 10 point ROS was performed; negative except as noted above  Physical Exam:  Vitals:    06/05/23 1357   BP: 132/74   Pulse: 70   Resp: 17   Temp: 97 8 °F (36 6 °C)   SpO2: 97%       GEN:  Lying in bed in NAD   HEENT/NECK: MMM  CARDIAC: Regular rate rhythm, no murmers, no rubs, no gallops  LUNGS:  clear to auscultation, no wheezes, rales, or rhonchi  ABDOMEN: Soft, non-tender, non-distended, normal active bowel sounds  EXTREMITIES/SKIN:  no calf edema, no calf tenderness to palpation  NEURO:   MENTAL STATUS: Aphasia stable, adequate wakefulness, able to follow most simple commands, CN II-XII: Stable and Strength/MMT:  Unchanged  PSYCH:  Affect:  Euthymic    HPI:  Per admitting provider   \"Constanza Patrick is a 58 y o  male with history of prediabetes, hypertension, depression, urinary retention on Flomax, carotid stenosis, depression, prior left MCA and right MCA stroke with aphasia and memory deficits now on Aricept who presented to the [facility] on 4/26 for AMS witnessed by his wife with abnormal speech that was noted to be similar to his prior strokes  Of note he was recently in the hospital earlier in the month for a left MCA stroke  He also had a right MCA stroke status post thrombectomy back in 2019  He is a prior smoker and quit at the time of his initial stroke  Additionally he had hyponatremia which was felt to be due to HCTZ use and potentially poor oral intake, chronic low back pain  He was seen by neurology as well as neurosurgery and eventually switched from his Eliquis/aspirin to aspirin and Brilinta  It was felt not to be cardioembolic by neurosurgery  He was also not given TNK given his recent Eliquis use when he arrived    He had imaging prior on his last admission however now with new " "focus of diffusion abnormality in the right frontal parietal region and in the left periatrial and parieto-occipital region  No acute hemorrhage was seen  Had a carotid Doppler showing LICA 19-65%/BTDH <71%   IVY was done and did not show any PFO or thrombus  Given that patient was on Plavix when he had his stroke on 04/16/2023, he was placed on Madonna Manzo was a question of vasculitis as the etiology but Neurosurgery saw him in consult and felt that it was likely related to atherosclerotic and carotid disease and they stopped the Eliquis and placed back on ASA   He had a cerebral arteriogram on 5/4 23 with a left ICA PTA/stent placement   There was no vasculitis noted    Per Neurology full anticoagulation did not need to be restarted  The patient was evaluated by the Rehabilitation team and deemed an appropriate candidate for comprehensive inpatient rehabilitation and admitted to the [facility] on 5/12/2023  2:35 PM\"    ** Please Note: Fluency Direct voice to text software may have been used in the creation of this document   **        "

## 2023-06-05 NOTE — PROGRESS NOTES
06/05/23 1400   Pain Assessment   Pain Assessment Tool 0-10   Pain Score No Pain   Restrictions/Precautions   Precautions Aphasia; Aspiration;Bed/chair alarms;Cognitive; Fall Risk;Impulsive;Supervision on toilet/commode;Visual deficit   Weight Bearing Restrictions No   ROM Restrictions No   Cognition   Overall Cognitive Status Impaired   Arousal/Participation Alert; Cooperative   Attention Attends with cues to redirect   Orientation Level Oriented to person;Oriented to place;Oriented to situation   Memory Decreased short term memory;Decreased recall of recent events;Decreased recall of precautions   Following Commands Follows one step commands with increased time or repetition   Lying to Sitting on Side of Bed   Type of Assistance Needed Physical assistance;Verbal cues   Physical Assistance Level 26%-50%   Comment pt S to sit EOB but once seated almost slide down bed and was unable to control himself requiring MODA to lay back in bed  Lying to Sitting on Side of Bed CARE Score 3   Sit to Stand   Type of Assistance Needed Incidental touching;Physical assistance;Verbal cues   Physical Assistance Level 25% or less   Comment to , from EOB   Sit to Stand CARE Score 3   Bed-Chair Transfer   Type of Assistance Needed Physical assistance;Verbal cues; Adaptive equipment   Physical Assistance Level 26%-50%   Comment stand pivot transfers, SPT with HW  Chair/Bed-to-Chair Transfer CARE Score 3   Transfer Bed/Chair/Wheelchair   Adaptive Equipment Goerge Walker   Car Transfer   Type of Assistance Needed Physical assistance;Verbal cues; Adaptive equipment   Physical Assistance Level Total assistance   Comment MIN/MODA with HW, SBA/CGA of second person for safety  Car Transfer CARE Score 1   Walk 10 Feet   Type of Assistance Needed Physical assistance;Verbal cues; Adaptive equipment   Physical Assistance Level Total assistance   Comment L HW with CGA to MODA, WC follow for safety   MAX VC's for sequecing   Walk 10 Feet CARE Score 1   Walk 50 Feet with Two Turns   Type of Assistance Needed Physical assistance;Verbal cues; Adaptive equipment   Physical Assistance Level Total assistance   Comment L HW with CGA to MODA, WC follow for safety  MAX VC's for sequecing   Walk 50 Feet with Two Turns CARE Score 1   Walking 10 Feet on Uneven Surfaces   Reason if not Attempted Safety concerns   Walking 10 Feet on Uneven Surfaces CARE Score 88   Ambulation   Primary Mode of Locomotion Prior to Admission Walk   Distance Walked (feet) 90 ft  (65' x2)   Assist Device George Walker   Gait Pattern Ataxic; Inconsistant Deepti;Decreased foot clearance;R foot drag; Forward Flexion; Wide NEREIDA;Step to; Improper weight shift;Decreased R stance   Limitations Noted In Balance; Coordination;Device Management; Heel Strike; Endurance;Midline Orientation;Posture; Safety;Speed;Strength;Swing   Provided Assistance with: Balance;Weight Shift   Walk Assist Level Minimum Assist;Moderate Assist   Does the patient walk? 2  Yes   Curb or Single Stair   Reason if not Attempted Safety concerns   1 Step (Curb) CARE Score 88   4 Steps   Reason if not Attempted Safety concerns   4 Steps CARE Score 88   12 Steps   Reason if not Attempted Safety concerns   12 Steps CARE Score 88   Picking Up Object   Reason if not Attempted Safety concerns   Picking Up Object CARE Score 88   Equipment Use   NuStep L4 x14 min   Other Comments   Comments While pt on NuStep pt's wife Beth Mast called to discuss pt's current status  Per wife pt was stating he was amb with a walker on his own  Education on pt requiring 2-3 people for gait at this time as his righting reactions and insight are poor  Discussed pt's lack of insight and cont cognitive deficits that limited his ability to carryover safety, control balance and sequencing  Pt's wife asking why pt was going to a sub acute facility  Discussed the insurnce plays a part as well as how it's based on diagnosis  Pt's wife showed understanding and felt better post call  Assessment   Treatment Assessment Pt participated in skilled PT session with increased focus on gait with HW  Pt cont to have poor righting reactions, decreased insight and poor carry over making him high risk for falls at this time  Although pt can vary to CGA with HW he cont to require MAX VC's and up to [facility] when amb bwds or when he lacks proper sequencing  Wife updated during session to current status as noted above  Pt anticipates discharge to subacute rehab tomorrow 6/6/23  Cont POC as tolerated with cont focus on standing dynamic balance, improved safety awareness, increased coordination, improved righting reactions and endurance  Problem List Decreased strength;Decreased endurance; Impaired balance;Decreased mobility; Decreased coordination;Decreased cognition; Impaired judgement;Decreased safety awareness; Impaired vision   Barriers to Discharge Inaccessible home environment;Decreased caregiver support   PT Barriers   Physical Impairment Decreased strength;Decreased range of motion;Decreased endurance; Impaired balance;Decreased mobility; Decreased coordination;Decreased cognition; Impaired judgement;Decreased safety awareness; Impaired vision; Impaired sensation; Impaired tone   Functional Limitation Car transfers; Ramp negotiation;Stair negotiation;Standing;Transfers; Walking   Plan   Treatment/Interventions Functional transfer training;LE strengthening/ROM; Therapeutic exercise; Endurance training;Cognitive reorientation;Patient/family training;Bed mobility;Gait training   Progress Slow progress, cognitive deficits   Recommendation   PT Discharge Recommendation Post acute rehabilitation services   PT Therapy Minutes   PT Time In 1400   PT Time Out 1530   PT Total Time (minutes) 90   PT Mode of treatment - Individual (minutes) 90   PT Mode of treatment - Concurrent (minutes) 0   PT Mode of treatment - Group (minutes) 0   PT Mode of treatment - Co-treat (minutes) 0   PT Mode of Treatment - Total time(minutes) 90 minutes   PT Cumulative Minutes 1488   Therapy Time missed   Time missed?  No

## 2023-06-05 NOTE — CASE MANAGEMENT
Received mssg from [facility] stating pts wife has selected them for pts continued rehab  Cm phoned wife Christ Guo to confirm and she did so  Cm inquired with dr Mp Marshall when pt is medically stable for dc as facility is ready to submit for auth  Awaiting response  Christ Guo asked for therapy to call her as pt reports he is walking with a walker with therapy  Therapy team made aware of request      0540 clinical update requested by [facility] [employer], faxed via epic to jesika at [facility] 53 [phone]  Cm made aware by admissions team at [facility] they have a pending auth for admission tomorrow and will notify cm when approved  Cm placed request via roundtrip for bls transport on 6/6  Awaiting scheduled ride    [phone]: phone call placed to pts wife updating her of potential dc for tomorrow  Christ Guo stated she had not yet heard from any therapists  Cm confirmed they were still treating patients and are aware of her request to expect a call within the hour  Christ Guo inquired if CM was going to inform the patient of the plan because she reported she had been the one to tell him at 7pm and it upset him  Cm clarified that the patient was aware of her request for him to go to a skilled nursing facility when she was on the phone with OT earlier that same day and the call was on speaker phone  CM met w/pt and made him aware of the potential dc plan for tomorrow and the location  [facility] scheduled for 6/6 at [facility]  Still awaiting confirmation of auth by insurance for pts admission to [facility]

## 2023-06-05 NOTE — PROGRESS NOTES
"Internal Medicine Progress Note  Patient: Denis Menjivar  Age/sex: 58 y o  male  Medical Record #: [id number]      ASSESSMENT/PLAN: (Interval History)  Denis Menjivar is seen and examined and management for following issues:    Acute multifocal ischemic strokes/recurrent left hemispheric infarction  • Occurred in different arterial distributions  • IVY was negative for thrombus/PFO  • Felt not to be vasculitis and negative by a-gram 5/4/23  • Continue ASA/Brilinta/statin  • CTH 5/29 = was abnormal with possible new infarcts after he felt his speech had declined   (Dr Walter Wade had seen him 5/28 for this complaint and had normal exam and felt speech was at baseline)  • MRI brain 5/30 = some new areas of acute to subacute left parietal/occipital infarcts; +NPH noted  • CTH/CTA 5/31/23 = \"Subacute ischemia within the left hemisphere without mass effect or taiwo hemorrhagic transformation   Advanced chronic microangiopathic change within the brain parenchyma   Stable atherosclerotic disease of the right carotid bifurcation including the ICA origin and midportion of the bulb, approximately 60 to 70% at the point of maximal stenosis   A carotid stent has been placed on the left with only mild residual narrowing within the proximal internal carotid artery   Stable severe stenosis of the distal right M1 segment with decreased size and number of M2 branches\"    • Neurology added Pletal onto the ASA and Brilinta on 6/1/23 for recurrent left hemispheric infarction with unclear etio  • Will need to followup in [facility]PH clinic as OP  • S/p IVF 2/2 having CTA      Left ICA stenosis  • Was noted to have all of the CVAs in last month have been in left anterior circulation  • S/p PTA/stent 5/4/23  • Plan was to recheck carotid doppler 6 weeks and see NS but 2/2 inc expresive aphasia the above workup was done  • Continue AP/statin     Right ICA stenosis  • To followup with Vasc surgery as OP  • Continue AP/statin     LOOP recorder  • Was " placed 3/2019  • Neuro wants it to be replaced = for OP to Cardiology     Leukocytosis  • Had no fevers  • CXR and Procal were normal in the hospital  • resolved     HTN  • Home:  Toprol 100mg qd/Norvasc 10mg qd/Losartan 50mg qd/HCTZ 25mg qd/Hydralazine 25mg TID  • Here:  Toprol 100mg qd/Losartan 50mg qd  • stable     Pre DM  • HbA1C 5 8  • Takes Metformin at home but currently not on it in hospital  • Accuchecks were stable and dc'd in the hospital  • Monitor FBS with BMPs and continue DM diet  •  on 6/5/23     Depression  • Continue Celexa as at home     Memory loss  • Continue Aricept as at home  • He has had memory issues since his CVA in 2019     Urine retention  • Has jo placed 5/8/23  • VT 5/18 failed and jo replaced by Urology 5/19/23  • Continue Flomax  • Found to have hematuria 6/3/23  Suspect traumatic cause  Resolved  • For VT today 6/5/23     Chronic LBP  • MRI showed advanced multilevel spondylosis, slightly progressed on the right at L2-3 compared to the prior study but otherwise stable  • Pain management per PMR     AAA  • Found on L-spine MRI  • Measures 3 4 cm  • OP followup     Hyponatremia  • Stopped HCTZ 5/16/23  • Resolved off of HCTZ and had been given IVF     Leg cramps  • Per PMR     UTI  • Cx = >100,000 Enterococcus faecalis and 20,000-29,000 mixed contaminants x 2  • Has had no fever   • Dr Kellen Byrd started Keflex 5/30 --> switched to Levaquin 750mg qd x 5 on 6/1 based on sensitivities = d/w Dr Dusty Rothman  Levaquin completed today 6/5/23     Thyroid nodules  • Found on CTA H/N  • Thyroid U/S 6/1/23 = right upper pole nodule 1 6 x 1 0 x 1 4 cm --> bx was recommended        Discharge date:  pending subacute rehab       The above assessment and plan was reviewed and updated as determined by my evaluation of the patient on 6/5/2023      Labs:   Results from last 7 days   Lab Units 06/05/23  0507 06/01/23  0547   HEMATOCRIT % 34 7* 36 0*   HEMOGLOBIN g/dL 12 0 12 4   PLATELETS Thousands/uL 256 252   WBC Thousand/uL 8 66 8 32     Results from last 7 days   Lab Units 06/05/23  0507 06/01/23  0547   BUN mg/dL 16 15   CALCIUM mg/dL 9 0 9 1   CHLORIDE mmol/L 110* 111*   CO2 mmol/L 24 24   CREATININE mg/dL 1 05 0 93   POTASSIUM mmol/L 3 5 3 6   SODIUM mmol/L 135 138                   Review of Scheduled Meds:  Current Facility-Administered Medications   Medication Dose Route Frequency Provider Last Rate   • acetaminophen  975 mg Oral TID PRN Maggy Garza MD     • aspirin  81 mg Oral Daily Elvia Pickup, DO     • atorvastatin  40 mg Oral Daily Elvia Pickup, DO     • baclofen  5 mg Oral BID PRN Maggy Garza MD     • bisacodyl  10 mg Rectal Daily PRN Elvia Pickup, DO     • calcium carbonate  1,000 mg Oral Daily PRN Elvia Pickup, DO     • cilostazol  100 mg Oral BID AC Maggy Garza MD     • citalopram  20 mg Oral Daily Elvia Pickup, DO     • donepezil  5 mg Oral BID Elvia Pickup, DO     • heparin (porcine)  5,000 Units Subcutaneous [facility] Elvia Pickup, DO     • lidocaine  1 patch Topical Daily Elvia Pickup, DO     • lidocaine   Topical Q4H PRN Maggy Garza MD     • losartan  50 mg Oral Daily Maggy Garza MD     • menthol-methyl salicylate   Apply externally 4x Daily PRN Nava Barth MD     • metoprolol succinate  100 mg Oral Daily Maggy Garza MD     • ondansetron  4 mg Oral Q6H PRN Elvia Pickup, DO     • oxyCODONE  5 mg Oral Q6H PRN Elvia Pickup, DO     • oxyCODONE  2 5 mg Oral Q6H PRN Elvia Pickup, DO     • pantoprazole  20 mg Oral Early Morning Elvia Pickup, DO     • polyethylene glycol  17 g Oral QAM Elvia Pickup, DO     • senna  2 tablet Oral Daily PRN Elvia Pickup, DO     • senna-docusate sodium  2 tablet Oral BID Elvia Pickup, DO     • tamsulosin  0 8 mg Oral Daily With [address], CRNP     • ticagrelor  90 mg Oral Q12H [address] Elvia Pickup, DO     • traZODone  50 mg Oral HS Maggy Garza MD Subjective/ HPI: Patient seen and examined  Patients overnight issues or events were reviewed with nursing or staff during rounds or morning huddle session  New or overnight issues include the following:     No new or overnight issues  Offers no complaints    ROS:   A 10 point ROS was performed; negative except as noted above  *Labs /Radiology studies reviewed  *Medications reviewed and reconciled as needed  *Please refer to order section for additional ordered labs studies  *Case discussed with primary attending during morning huddle case rounds    Physical Examination:  Vitals:   Vitals:    06/04/23 2044 06/05/23 0502 06/05/23 0856 06/05/23 1100   BP: 130/70 133/60 112/72 138/72   BP Location: Right arm Right arm  Right arm   Pulse: 79 83 88    Resp: 20 20     Temp: 98 4 °F (36 9 °C) 98 1 °F (36 7 °C)     TempSrc: Oral Oral     SpO2: 97% 95%     Weight:           General Appearance: no distress, conversive  HEENT: PERRLA, conjuctiva normal; oropharynx clear; mucous membranes moist   Neck:  Supple, normal ROM  Lungs: CTA, normal respiratory effort, no retractions, expiratory effort normal  CV: regular rate and rhythm; no rubs/murmurs/gallops, PMI normal   ABD: soft; ND/NT; +BS  EXT: no edema  Skin: normal turgor, normal texture, no rashes  Psych: affect normal, mood normal  Neuro: AA      The above physical exam was reviewed and updated as determined by my evaluation of the patient on 6/5/2023  Invasive Devices     Drain  Duration           Urethral Catheter Coude 16 Fr  16 days                   VTE Pharmacologic Prophylaxis: Heparin  Code Status: Level 1 - Full Code  Current Length of Stay: 24 day(s)      Total time spent:  30 minutes with more than 50% spent counseling/coordinating care  Counseling includes discussion with patient re: progress  and discussion with patient of his/her current medical state/information  Coordination of patient's care was performed in conjunction with primary service  Time invested included review of patient's labs, vitals, and management of their comorbidities with continued monitoring  In addition, this patient was discussed with medical team including physician and advanced extenders  The care of the patient was extensively discussed and appropriate treatment plan was formulated unique for this patient  Medical decision making for the day was made by supervising physician unless otherwise noted in their attestation statement  ** Please Note:  voice to text software may have been used in the creation of this document   Although proof errors in transcription or interpretation are a potential of such software**

## 2023-06-06 VITALS
HEART RATE: 78 BPM | WEIGHT: 210.1 LBS | DIASTOLIC BLOOD PRESSURE: 82 MMHG | BODY MASS INDEX: 31.95 KG/M2 | RESPIRATION RATE: 18 BRPM | OXYGEN SATURATION: 98 % | TEMPERATURE: 98.1 F | SYSTOLIC BLOOD PRESSURE: 142 MMHG

## 2023-06-06 LAB — SARS-COV-2 RNA RESP QL NAA+PROBE: NEGATIVE

## 2023-06-06 PROCEDURE — 99232 SBSQ HOSP IP/OBS MODERATE 35: CPT | Performed by: NURSE PRACTITIONER

## 2023-06-06 PROCEDURE — 97535 SELF CARE MNGMENT TRAINING: CPT

## 2023-06-06 PROCEDURE — 97530 THERAPEUTIC ACTIVITIES: CPT

## 2023-06-06 PROCEDURE — 99239 HOSP IP/OBS DSCHRG MGMT >30: CPT

## 2023-06-06 PROCEDURE — 87635 SARS-COV-2 COVID-19 AMP PRB: CPT

## 2023-06-06 RX ORDER — TAMSULOSIN HYDROCHLORIDE 0.4 MG/1
0.4 CAPSULE ORAL
Status: DISCONTINUED | OUTPATIENT
Start: 2023-06-06 | End: 2023-06-06 | Stop reason: HOSPADM

## 2023-06-06 RX ORDER — TAMSULOSIN HYDROCHLORIDE 0.4 MG/1
0.4 CAPSULE ORAL
Refills: 0
Start: 2023-06-06

## 2023-06-06 RX ADMIN — TICAGRELOR 90 MG: 90 TABLET ORAL at 10:33

## 2023-06-06 RX ADMIN — ATORVASTATIN CALCIUM 40 MG: 40 TABLET, FILM COATED ORAL at 10:31

## 2023-06-06 RX ADMIN — CILOSTAZOL 100 MG: 100 TABLET ORAL at 06:34

## 2023-06-06 RX ADMIN — LIDOCAINE 5% 1 PATCH: 700 PATCH TOPICAL at 10:31

## 2023-06-06 RX ADMIN — CITALOPRAM HYDROBROMIDE 20 MG: 20 TABLET ORAL at 10:31

## 2023-06-06 RX ADMIN — DONEPEZIL HYDROCHLORIDE 5 MG: 5 TABLET ORAL at 10:31

## 2023-06-06 RX ADMIN — HEPARIN SODIUM 5000 UNITS: 5000 INJECTION INTRAVENOUS; SUBCUTANEOUS at 05:54

## 2023-06-06 RX ADMIN — METOPROLOL SUCCINATE 100 MG: 100 TABLET, EXTENDED RELEASE ORAL at 10:41

## 2023-06-06 RX ADMIN — SENNOSIDES 17.2 MG: 8.6 TABLET, FILM COATED ORAL at 10:30

## 2023-06-06 RX ADMIN — ASPIRIN 81 MG: 81 TABLET, COATED ORAL at 10:30

## 2023-06-06 RX ADMIN — PANTOPRAZOLE SODIUM 20 MG: 20 TABLET, DELAYED RELEASE ORAL at 05:54

## 2023-06-06 RX ADMIN — LOSARTAN POTASSIUM 50 MG: 50 TABLET, FILM COATED ORAL at 10:41

## 2023-06-06 NOTE — CASE MANAGEMENT
Cm received confirmation from thee at [facility] that they received authorization from [facility] for pts admission today  Transport remains at 1pm with [facility]  Phone call placed to pts wife to confirm dc arrangements  PASSR completed and uploaded into [employer] for facility  Facility contact info provided to [employer] nurse and covid test ordered

## 2023-06-06 NOTE — PLAN OF CARE
Problem: PAIN - ADULT  Goal: Verbalizes/displays adequate comfort level or baseline comfort level  Description: Interventions:  - Encourage patient to monitor pain and request assistance  - Assess pain using appropriate pain scale  - Administer analgesics based on type and severity of pain and evaluate response  - Implement non-pharmacological measures as appropriate and evaluate response  - Consider cultural and social influences on pain and pain management  - Notify physician/advanced practitioner if interventions unsuccessful or patient reports new pain  Outcome: Adequate for Discharge     Problem: INFECTION - ADULT  Goal: Absence or prevention of progression during hospitalization  Description: INTERVENTIONS:  - Assess and monitor for signs and symptoms of infection  - Monitor lab/diagnostic results  - Monitor all insertion sites, i e  indwelling lines, tubes, and drains  - Monitor endotracheal if appropriate and nasal secretions for changes in amount and color  - [place] appropriate cooling/warming therapies per order  - Administer medications as ordered  - Instruct and encourage patient and family to use good hand hygiene technique  - Identify and instruct in appropriate isolation precautions for identified infection/condition  Outcome: Adequate for Discharge  Goal: Absence of fever/infection during neutropenic period  Description: INTERVENTIONS:  - Monitor WBC    Outcome: Adequate for Discharge     Problem: SAFETY ADULT  Goal: Patient will remain free of falls  Description: INTERVENTIONS:  - Educate patient/family on patient safety including physical limitations  - Instruct patient to call for assistance with activity   - Consult OT/PT to assist with strengthening/mobility   - Keep Call bell within reach  - Keep bed low and locked with side rails adjusted as appropriate  - Keep care items and personal belongings within reach  - Initiate and maintain comfort rounds  - Make Fall Risk Sign visible to staff  - Offer Toileting Marquez Dobson advance of need  - Initiate/Maint  - Obtain necessary fall risk manag  - Apply yellow socks and bracelet for high fall risk patients  - Consider moving patient to room near nurses station  Outcome: Adequate for Discharge  Goal: Maintain or return to baseline ADL function  Description: INTERVENTIONS:  -  Assess patient's ability to carry out ADLs; assess patient's baseline for ADL function and identify physical deficits which impact ability to perform ADLs (bathing, care of mouth/teeth, toileting, grooming, dressing, etc )  - Assess/evaluate cause of self-care deficits   - Assess range of motion  - Assess patient's mobility; develop plan if impaired  - Assess patient's need for assistive devices and provide as appropriate  - Encourage maximum independence but intervene and supervise when necessary  - Involve family in performance of ADLs  - Assess for home care needs following discharge   - Consider OT consult to assist with ADL evaluation and planning for discharge  - Provide patient education as appropriate  Outcome: Adequate for Discharge  Goal: Maintains/Returns to pre admission functional level  Description: INTERVENTIONS:  - Perform BMAT or MOVE assessment daily    - Set and communicate daily mobility goal to care team and patient/family/caregiver  - Collaborate with rehabilitation services on mobility goals if consulted  - Perform Range omes a day  - Reposition paturs    - Dangle patient  - Stand patient  - Ambulate   - Out of bed to  - Out of bed for  - Out of bed for toileting  - Record patient progress and toleration of activity level   Outcome: Adequate for Discharge     Problem: DISCHARGE PLANNING  Goal: Discharge to home or other facility with appropriate resources  Description: INTERVENTIONS:  - Identify barriers to discharge w/patient and caregiver  - Arrange for needed discharge resources and transportation as appropriate  - Identify discharge learning needs (meds, wound care, etc )  - Arrange for interpretive services to assist at discharge as needed  - Refer to Case Management Department for coordinating discharge planning if the patient needs post-hospital services based on physician/advanced practitioner order or complex needs related to functional status, cognitive ability, or social support system  Outcome: Adequate for Discharge     Problem: Prexisting or High Potential for Compromised Skin Integrity  Goal: Skin integrity is maintained or improved  Description: INTERVENTIONS:  - Identify patients at risk for skin breakdown  - Assess and monitor skin integrity  - Assess and monitor nutrition and hydration status  - Monitor labs   - Assess for incontinence   - Turn and reposition patient  - Assist with mobility/ambulation  - Relieve pressure over bony prominences  - Avoid friction and shearing  - Provide appropriate hygiene as needed including keeping skin clean and dry  - Evaluate need for skin moisturizer/barrier cream  - Collaborate with interdisciplinary team   - Patient/family teaching  - Consider wound care consult   Outcome: Adequate for Discharge     Problem: MOBILITY - ADULT  Goal: Maintain or return to baseline ADL function  Description: INTERVENTIONS:  -  Assess patient's ability to carry out ADLs; assess patient's baseline for ADL function and identify physical deficits which impact ability to perform ADLs (bathing, care of mouth/teeth, toileting, grooming, dressing, etc )  - Assess/evaluate cause of self-care deficits   - Assess range of motion  - Assess patient's mobility; develop plan if impaired  - Assess patient's need for assistive devices and provide as appropriate  - Encourage maximum independence but intervene and supervise when necessary  - Involve family in performance of ADLs  - Assess for home care needs following discharge   - Consider OT consult to assist with ADL evaluation and planning for discharge  - Provide patient education as appropriate  Outcome: Adequate for Discharge  Goal: Maintains/Returns to pre admission functional level  Description: INTERVENTIONS:  - Perform BMAT or MOVE assessment daily    - Set and communicate daily mobility goal to care team and patient/family/caregiver  - Collaborate with rehabilitation services on mobility goals if consulted  - Perfores a day  - Reposition rochelle Barber  - Stand pa  - Ambulate pat  - Out of bed t  - Out of bed for toileting  - Record patient progress and toleration of activity level   Outcome: Adequate for Discharge     Problem: Nutrition/Hydration-ADULT  Goal: Nutrient/Hydration intake appropriate for improving, restoring or maintaining nutritional needs  Description: Monitor and assess patient's nutrition/hydration status for malnutrition  Collaborate with interdisciplinary team and initiate plan and interventions as ordered  Monitor patient's weight and dietary intake as ordered or per policy  Utilize nutrition screening tool and intervene as necessary  Determine patient's food preferences and provide high-protein, high-caloric foods as appropriate       INTERVENTIONS:  - Monitor oral intake, urinary output, labs, and treatment plans  - Assess nutrition and hydration status and recommend course of action  - Evaluate amount of meals eaten  - Assist patient with eating if necessary   - Allow adequate time for meals  - Recommend/ encourage appropriate diets, oral nutritional supplements, and vitamin/mineral supplements  - Order, calculate, and assess calorie counts as needed  - Recommend, monitor, and adjust tube feedings and TPN/PPN based on assessed needs  - Assess need for intravenous fluids  - Provide specific nutrition/hydration education as appropriate  - Include patient/family/caregiver in decisions related to nutrition  Outcome: Adequate for Discharge

## 2023-06-06 NOTE — NURSING NOTE
Report called to [facility] in [place], [place]  Spoke with nurse Romi North  All questions answered  All belongings with pt and transport team   Tuan Ya stable upon discharge

## 2023-06-06 NOTE — PROGRESS NOTES
"OT Daily Treatment Note       06/06/23 1000   Pain Assessment   Pain Assessment Tool 0-10   Pain Score No Pain   Restrictions/Precautions   Precautions Aphasia; Aspiration;Bed/chair alarms;Cognitive; Fall Risk; [place]; Impulsive;Supervision on toilet/commode;Visual deficit   Lifestyle   Autonomy \"I would love to get another shower\"   Oral Hygiene   Type of Assistance Needed Set-up / clean-up   Physical Assistance Level No physical assistance   Comment full setup with toothbrush placed in pts R hand seated in WC at sink   Oral Hygiene CARE Score 5   Shower/Bathe Self   Type of Assistance Needed Physical assistance   Physical Assistance Level 26%-50%   Comment full shower seated on bench; pt able to bathe UB with mod vc as pt would drop washcloth without realizing and continue to try and bathe without it  assist for bathing of buttock in stance via pull to stand with grab bars     Shower/Bathe Self CARE Score 3   Tub/Shower Transfer   Findings swap out method from [facility] > tub transfer bench with Ax1 via pull to stand with grab bars   Upper Body Dressing   Type of Assistance Needed Physical assistance   Physical Assistance Level 25% or less   Comment min A for RUE threading   Upper Body Dressing CARE Score 3   Lower Body Dressing   Type of Assistance Needed Physical assistance   Physical Assistance Level Total assistance   Comment TA to thread over BLE and over hips in stance via pull to stand with Ax1   Lower Body Dressing CARE Score 1   Putting On/Taking Off Footwear   Type of Assistance Needed Physical assistance   Physical Assistance Level 76% or more   Comment TA to don socks and shoes, pt able to doff L shoe   Putting On/Taking Off Footwear CARE Score 2   Sit to Lying   Type of Assistance Needed Supervision   Physical Assistance Level No physical assistance   Comment CS with HOB flat   Sit to Lying CARE Score 4   Lying to Sitting on Side of Bed   Type of Assistance Needed Incidental touching   Physical Assistance Level No " physical assistance   Comment CG with hob flat, use of bedrails   Lying to Sitting on Side of Bed CARE Score 4   Sit to Stand   Type of Assistance Needed Incidental touching   Physical Assistance Level No physical assistance   Comment CG with HW and pull to stand with grab bar/bedrails   Sit to Stand CARE Score 4   Bed-Chair Transfer   Type of Assistance Needed Physical assistance   Physical Assistance Level 25% or less   Comment Min A SPT with HW to the L   Chair/Bed-to-Chair Transfer CARE Score 3   Cognition   Overall Cognitive Status Impaired   Arousal/Participation Alert; Cooperative   Attention Attends with cues to redirect   Orientation Level Oriented to person;Oriented to situation   Memory Decreased short term memory;Decreased recall of recent events;Decreased recall of precautions   Following Commands Follows one step commands with increased time or repetition   Activity Tolerance   Activity Tolerance Patient tolerated treatment well   Assessment   Treatment Assessment Pt engaged in skilled OT session focusing on ADL performance in prep for ADL completion  Pt has made good  progress during [facility]  Pt is overall functioning at Partial/moderate assistance  for ADLs, Partial/moderate assistance  for functional transfers and Substantial/maximal assistance  for functional mobility  Prior to [address] recommendations include bedside commode, shower chair, walker and wheelchair   DME was ordered to maximize functional independence and decrease fall risk  Based on pts functional performance, pt is safe to [facility] w/ recommendations noted above  Pt would benefit from continued OT services in skilled nursing facility to maximize functional abilities     Recommendation   OT Discharge Recommendation Post acute rehabilitation services   OT Therapy Minutes   OT Time In 1000   OT Time Out 1130   OT Total Time (minutes) 90   OT Mode of treatment - Individual (minutes) 90   OT Mode of treatment - Concurrent (minutes) 0   OT Mode of treatment - Group (minutes) 0   OT Mode of treatment - Co-treat (minutes) 0   OT Mode of Treatment - Total time(minutes) 90 minutes   OT Cumulative Minutes 1765   Therapy Time missed   Time missed?  No

## 2023-06-06 NOTE — DISCHARGE SUMMARY
Discharge Summary - José Castellanos 58 y o  male MRN: [id number]  Unit/Bed#: -01 Encounter: [id number]    Admission Date: 5/12/2023     Discharge Date: 6/6/2023    Rehabilitation/Etiologic Diagnosis:   Stroke:  01 4  No paresis  Left Parietal Stroke, B/L Carotid Artery Stenosis    Discharge Diagnoses:      Patient Active Problem List   Diagnosis   • History of stroke   • Primary hypertension   • GERD (gastroesophageal reflux disease)   • At risk for venous thromboembolism (VTE)   • Nicotine dependence   • Bilateral carotid artery stenosis   • Urinary retention   • Anxiety and depression   • Insomnia   • Type 2 diabetes mellitus (HCC)   • Status post placement of implantable loop recorder   • Chronic ischemic right MCA stroke   • Memory deficits   • Chronic low back pain   • Stroke ([address] )   • History of tobacco use   • Chronic ischemic left MCA stroke   • Hypokalemia   • Abnormal laboratory test   • Multiple thyroid nodules   • Possible NPH (normal pressure hydrocephalus) ([facility])   • Muscle spasms of both lower extremities       Acute Rehabilitation Center Course:     Patient participated in a comprehensive interdisciplinary inpatient rehabilitation program which included involvment of MD, therapies (PT, OT, and SLP), RN, CM/SW, dietary with some functional gains in ADLs, mobility, and speech/cognition as outlined below  He will however continue to require further skilled rehabilitation and recovery time before patient can safely discharge back to community  Patient is considered adequately safe and appropriate for discharged to SNF to continue recovery process  Medical issues with comanagement from our internal medicine, behavioral health, neuropsych, and urology teams as outlined below  Please see below for patient's hospital course and day to day management of medical needs with significant findings, complications (if applicable), treatment, and services provided in problem list format  Decline in ADLs and mobility: Functional assessment - improving slowly                                                    FIM  Care Score   Admit Score Recent Score    Total assist  1-100% or 2p    Tot ADLs LBD   Max assist 2-51-75%    Sub    Bathing   Mod assist 3- 26-50%  Par       Min assist 3- 25% or < Par   UBD   CG assist 4  TA       Sup/Setup 4-5 Sup       Mod-I/Indep 6 MI         Transfers   Total assist  Mod-total assist      Ambulation    Total assist  Total assist      Stairs    Total assist/NT        Major barriers:  Impaired cognition, speech, balance, deconditioning  Dispo: SNF    * Stroke [facility])  Assessment & Plan  Recent multifocal ischemic infarcts in different distributions of unclear etiology   · Imaging revealed multifocal strokes; underwent L ICA PTA/stenting 5/4 also hx of R ICA stenosis   · Seen by neuro and NSx and eventually switched from his Eliquis/aspirin to aspirin and Umesh [place] was felt not to be cardioembolic by neurosurgery  Devaughn Chvaes was a question of vasculitis as the etiology but Neurosurgery saw him in consult and felt that it was likely related to atherosclerotic and carotid disease and they stopped the Eliquis and placed back on ASA  Neuro did not feel he needed to go back on full A/C as well  · Additional possible new subacute stroke found on repeat imaging 5/29-5/30 > Pletal was added to aspirin and Brilinta  · Continue Aspirin 81mg qday, Brilinta 90mg qday, and Pletal 100mg BID  · Continue Lipitor 40mg qday and optimal BP mgmt   · Has loop recorder placed 2019 and neuro wants OP replacement of loop recorder - OP cards f/u   · Thrombosis panel done in 04/18/2023 that was only abnl for AT3 that was low in the acute setting and needs to be repeated in future   Repeat OP thrombosis panel thru neuro or hematology  · Follow-up with OP Neurology and OP NeuroSx Dr Feliberto Whitlock  · CT of the chest abdomen pelvis without evidence of malignancy done on the last admission on "4/17  · Thyroid nodules - with US recommending follow-up biopsy - follow-up with PCP/SNF provider to arrange biopsy within next 2-4 weeks and referral to endo or other provider at their discretion  · A-gram not c/w vasculitis   · Recent IVY with no PFO  · Previous TTE 04/18/23 showed ED of 55% and nl wall motion and mildly dilated L atrium  · Some functional gains but needs additional PT, OT, ST and recovery time in subacute setting prior to returning home    Neuro f/u 6/1 - \"additional ischemia within the L parieto-occipital region consistent with the area of prior ischemia  Case was further discussed with Neuro IR as well  CTA was reviewed and stent appears patent with only mild stenosis present  Recurrent L hemispheric infarction in the setting of multiple prior anterior circulation infarcts and longstanding b/l intracranial ICA disease now s/p L ICA stenting, which is patent  This could represent evolving ischemia from his prior insult or perhaps new injury - this is unclear  Given he has failed multiple prior medical trials, feel benefit of maximizing his medical regimen as much is possible outweighs potential risk  We will add Pletal at this point to his ASA and Brillinta  5/31 - CTA H/N - CT brain: Subacute ischemia within the left hemisphere without mass effect or taiwo hemorrhagic transformation  Advanced chronic microangiopathic change within the brain parenchyma  CT angiogram: Stable atherosclerotic disease of the right carotid bifurcation including the ICA origin and midportion of the bulb, approximately 60 to 70% at the point of maximal stenosis  A carotid stent has been placed on the left with only mild residual narrowing within the proximal internal carotid artery  Stable severe stenosis of the distal right M1 segment with decreased size and number of M2 branches  5/30 - MRI Brain - Similar areas of left parietal occipital recent ischemia/infarct   Some new areas of acute to subacute left " parietal/occipital acute to subacute ischemia  Findings suggesting normal pressure hydrocephalus  5/29 - patient reported feeling off that may have started 5/28 - like he did when he had prior CVA with some worsening speech but gross neuro exam fairly stable; felt more tired   5/29 - CTH -  Slight interval progression and extension of the nonhemorrhagic acute to early subacute left cerebral infarction noted on the recent MRI study  Mild regional sulcal crowding but no significant mass effect on the underlying ventricular system or evidence of herniation  Redemonstrated disproportionate ventriculomegaly to sulcal prominence which can be seen in patients with normal pressure hydrocephalus  Background cerebral white matter changes consistent with moderate chronic microangiopathic disease  5/2 MRI brain   1  Incomplete exam since patient was not able to finish the study  Axial diffusion and T2 sequences and sagittal T1 sequence were obtained  2   Worsened recent infarcts with new areas of acute ischemia in similar distribution involving left parietotemporal and occipital periventricular and deep white matter, and to lesser degree cortices/subcortical white matter  Associated edema with mild   regional mass effect  There is no T1 hyperintense hemorrhage  Cannot evaluate for petechial hemorrhage (gradient susceptibility sequence not obtained)  3  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  Muscle spasms of both lower extremities  Assessment & Plan  Significant at times;  Baclofen 5mg BID PRN   Has had chronically since prior CVA but significantly worse recently; strength stable; possible recent UTI - completed course Abx      Bilateral carotid artery stenosis  Assessment & Plan  · Status post left ICA angioplasty and stent placement neurovascular service  · Neuro c/s and f/u during ARC course - they also spoke with NSx   · Per IM- S/p L ICA PTA/stent 5/4/23; Recheck carotid doppler 6 weeks and see NS - has OP follow-up with Dr Maria Esther Zuñiga 6/26  · Brilinta, aspirin, pletal and statin  · Optimal BP mgmt       Memory deficits  Assessment & Plan  · Hx of multiple strokes and some cog deficits and residual aphasia/apraxia  · He does still have some aphasia and apraxia but improving   · Neuropsych eval 6/1  · However, patient appeared to understand the concept of Power of  and on three occasions clearly stated that his wife serve in this capacity  During this encounter, patient appears to have capacity to make informed decisions regarding the appointment of POA    · I am in agreement that in spite of patient having some cog deficits he seems to understanding and appreciate concept of POA and his needs and options regarding this and elects to choose his wife with acceptable level of capacity for this specific issue at this time  · Started on Aricept due to memory difficulties after stroke in 2019  · Currently on 5 mg twice daily  · Sees Dr Jose Duggan in outpatient neurology    Anxiety and depression  Assessment & Plan  Mood/sleeping improving  Significant Anxiety, depression, significant insomnia with difficulty adjusting earlier in course     Neuropsych c/s 5/22  Adjustment Disorder with Anxiety and Depression  R/O Post Stroke Depression  • Supportive psychotherapy, utilizing CBT and mindfulness strategies  • DBT distress tolerance techniques  to improve coping and mood  • Meditation and relaxation training  Continue chronic home Celexa 20mg qday   Continue Trazodone 50mg HS   - Monitor for excessive serotonin - not appreciable thus far    -Monitor for signs and symptoms of excessive serotonin  -Monitor mood, efficacy, tolerance     Urinary retention  Assessment & Plan  - Urinary retention s/p jo removal 6/5 - now with incontinence and difficulty controlling urination but overall low bladder scans except 1 slightly elevated; Recommend condom cath and continuing bladder scans Q4H with SC>450 cc; decrease flomax back to 0 4mg - Overall mgmt per SNF provider after d/c and OP urology f/u   - Possible Enterococcus -UTI    - IM recommended Levaquin 750mg qday x5 d thru 6/5 completed to cover complicated UTI; leukocytosis resolved    - Patient had functional decline and increased significant LE spasms - may have been in part related to subacute CVA but did grow >100K Enterococcus   - Urology c/s 5/19 for ongoing and recurrent retention as well as difficulty to place jo  - Monitor for infection and retention  - Optimal bowel mgmt, mobilize  - OP urology follow-up       Multiple thyroid nodules  Assessment & Plan  IMPRESSION:  The following meet current ACR criteria for recommending ultrasound guided biopsy:  - RIGHT upper pole nodule measuring 1 6 x 1 0 x 1 4 cm  - Discussed above with IM and they recommend biopsy after discharge from [facility]   - Discussed with wife who is aware and will ensure follow-up with PCP or SNF provider with 2-4 weeks - referral to endocrine or other provider at their discretion     Incidental finding on CTA 6/1  SOFT TISSUES OF THE NECK: Several hypodense nodules are scattered within the thyroid gland  The largest of these is located on the right measuring 1 5 cm in craniocaudad dimension  This has gradually increased in size when compared with prior examination;  from 2019  Incidental discovery of one or more thyroid nodule(s) measuring more than 1 5 cm and without suspicious features is noted in this patient who is above 28years old; according to guidelines published in the February 2015 white paper on incidental thyroid nodules in the Journal of the [employer] of Radiology Alysha Brothers), further characterization with thyroid ultrasound is recommended  - Thyroid US 6/1  FINDINGS:  Normal homogeneous smooth echotexture      Right lobe: 5 1 x 2 2 x 2 1 cm  Volume 11 5 mL  Left lobe:  4 5 x 2 1 x 1 2 cm  Volume 5 4 mL  Isthmus: 0 3  cm      Nodule #1  Image 25  RIGHT upper pole nodule measuring 1 6 x 1 0 x 1 4 cm  No priors for comparison  COMPOSITION:  2 points, solid or almost completely solid   ECHOGENICITY:  2 points, hypoechoic  SHAPE:  0 points, wider-than-tall  MARGIN: 0 points, smooth  ECHOGENIC FOCI:  0 points, none or large comet-tail artifacts  TI-RADS Classification: TR 4 (4-6 points),  FNA if > 1 5 cm  Follow if > 1cm      Nodule #2  Image 30  RIGHT lower pole nodule measuring 1 5 x 0 9 x 1 5 cm  No priors for comparison  COMPOSITION:  1 point, mixed cystic and solid  ECHOGENICITY:  1 point, hyperechoic or isoechoic  SHAPE:  0 points, wider-than-tall  MARGIN: 0 points, smooth  ECHOGENIC FOCI:  0 points, none or large comet-tail artifacts  TI-RADS Classification: TR 2 (2 points), Not suspicious  No FNA      Additional cystic nodules do not meet criteria for follow-up               Hypertriglyceridemia  Assessment & Plan  Continue statin    Primary hypertension  Assessment & Plan  · Adequate control  · Mgmt per IM:  metoprolol succinate 100 mg daily as well as Cozaar 50 mg daily   · Hctz stopped    Leukocytosis-resolved as of 6/5/2023  Assessment & Plan  · Resolved     GERD (gastroesophageal reflux disease)  Assessment & Plan  Continue pantoprazole and as needed Tums    At risk for venous thromboembolism (VTE)  Assessment & Plan  SCDs, ambulation, and heparin 5000 units SQ TID with fair amount of ongoing immobility (also on aspirin, plavix, and pletal)  - Hold heparin at discretion of SNF provider once discharged from [facility]       Possible NPH (normal pressure hydrocephalus) ([facility] [address] )  Assessment & Plan  5/30 - MRI Brain - Similar areas of left parietal occipital recent ischemia/infarct  Some new areas of acute to subacute left parietal/occipital acute to subacute ischemia  Findings suggesting normal pressure hydrocephalus    Neuro recommends OP NPH clinic c/ NSx after d/c - discussed with wife "and she reports adequate understanding and will set-up  Fall precautions, PT, OT, monitor b/b function, and cognition     Abnormal laboratory test  Assessment & Plan  AT3 89% on 4/2023 lab per prior providers; can be low from recent stroke and not true AT3 def  - Recommend follow-up with neurology and repeat thrombosis panel as an outpatient     Hypokalemia  Assessment & Plan  · Remains improved  · Monitor intermittently at SNF and with PCP     Chronic ischemic left MCA stroke  Assessment & Plan  · Recent left MCA stroke in the beginning of April at 4/16/2023 with aphasia and was empirically treated with Eliquis 5 mg twice daily as well as aspirin 81 mg due to embolic stroke of undetermined source per prior documentation  · Despite this he presented with additional strokes for this admission > see mgmt above     History of tobacco use  Assessment & Plan  · Patient with a history of tobacco use yet quit in 2019  · Does not need nicotine patches    Infrarenal abdominal aortic aneurysm (AAA) without rupture [facility])  Assessment & Plan  Incidental finding 4/17  Minimal aneurysmal dilatation of infrarenal abdominal aorta up to 3 3 cm  Follow-up with vasc surgery     Prediabetes  Assessment & Plan  · Last hemoglobin A1c of 5 8  · Mgmt per IM during ARC   · Started on consistent carbohydrate 3 diet here in addition to cardiac diet on admission to [facility]    Chronic low back pain  Assessment & Plan  · Adequate control  · APAP TID PRN - discussed with pt   · PRN LD patch  · (Not needing Oxy PRN recently - hold on d/c)     Chronic ischemic right MCA stroke  Assessment & Plan  · Patient with history of right MCA stroke back in March 2019 status post thrombectomy  · Had loop recorder placement without overt arrhythmia and was on Plavix 75 mg at that time    Also with known severe right M1 stenosis  · See \"Stroke\" mgmt above         Follow-up providers (see above for specific issues to follow-up):  SNF/PCP providers - obtain biopsy " thru SNF or PCP provider within 2-4 weeks - referral to endocrine to also ensure appropriate follow-up provider -  discussed above and all findings that needed to be followed up with wife at length who appeared to have adequate understanding     Neurology - neurovascular and memory  NeuroSx - interventional - Dr Maria Esther Zuñiga and [facility]  Urology  Cardiology     - See AVS (After visit summary) with discharge instructions, discharge medications, and other details  Imaging (See above as well):  US thyroid   Final Result by Maya Lynch MD (06/01 1421)      The following meet current ACR criteria for recommending ultrasound guided biopsy:   - RIGHT upper pole nodule measuring 1 6 x 1 0 x 1 4 cm         Reference: ACR Thyroid Imaging, Reporting and Data System (TI-RADS): White Paper of the [employer]  J AM Aram Radiol 5398;86:616-557  (additional recommendations based on American Thyroid Association 2015 guidelines )      The study was marked in [facility] for immediate notification  Workstation performed: [id number]         CTA head and neck w wo contrast   Final Result by Reynaldo Duarte DO (05/31 0081)      CT brain: Subacute ischemia within the left hemisphere without mass effect or taiwo hemorrhagic transformation  Advanced chronic microangiopathic change within the brain parenchyma  CT angiogram: Stable atherosclerotic disease of the right carotid bifurcation including the ICA origin and midportion of the bulb, approximately 60 to 70% at the point of maximal stenosis  A carotid stent has been placed on the left with only mild residual narrowing within the proximal internal carotid artery  Stable severe stenosis of the distal right M1 segment with decreased size and number of M2 branches        Workstation performed: [id number]         MRI brain wo contrast   Final Result by Mariely Morrison MD (05/30 1574)      Similar areas of left parietal occipital recent "ischemia/infarct  Some new areas of acute to subacute left parietal/occipital acute to subacute ischemia  Findings suggesting normal pressure hydrocephalus  Findings were marked as \"immediate\"in Epic and will now be related to the ordering physician or covering clinical team by the radiology liaison  Workstation performed: [id number]         CT head wo contrast   Final Result by Sara Duncan MD (05/30 9939)      Slight interval progression and extension of the nonhemorrhagic acute to early subacute left cerebral infarction noted on the recent MRI study  Mild regional sulcal crowding but no significant mass effect on the underlying ventricular system or evidence    of herniation  Redemonstrated disproportionate ventriculomegaly to sulcal prominence which can be seen in patients with normal pressure hydrocephalus  Background cerebral white matter changes consistent with moderate chronic microangiopathic disease  The study was marked in [facility] for immediate notification              Workstation performed: [id number]             Pertinent Recent Labs (See Course above, EPIC EMR, and if needed request additional records):   Latest Reference Range & Units 06/05/23 05:07   Sodium 135 - 147 mmol/L 135   Potassium 3 5 - 5 3 mmol/L 3 5   Chloride 96 - 108 mmol/L 110 (H)   CO2 21 - 32 mmol/L 24   Anion Gap 4 - 13 mmol/L 1 (L)   BUN 5 - 25 mg/dL 16   Creatinine 0 60 - 1 30 mg/dL 1 05   Glucose, Random 65 - 140 mg/dL 102   GLUCOSE FASTING 65 - 99 mg/dL 102 (H)   Calcium 8 3 - 10 1 mg/dL 9 0   eGFR ml/min/1 73sq m 74   WBC 4 31 - 10 16 Thousand/uL 8 66   Red Blood Cell Count 3 88 - 5 62 Million/uL 3 61 (L)   Hemoglobin 12 0 - 17 0 g/dL 12 0   HCT 36 5 - 49 3 % 34 7 (L)   MCV 82 - 98 fL 96   MCH 26 8 - 34 3 pg 33 2   MCHC 31 4 - 37 4 g/dL 34 6   RDW 11 6 - 15 1 % 14 6   Platelet Count 861 - 390 Thousands/uL 256   (H): Data is abnormally high  (L): Data is abnormally low    Procedures Performed During ARC " "Admission: None    Discharge Physical Examination:  Temp:  [97 8 °F (36 6 °C)-98 2 °F (36 8 °C)] 98 1 °F (36 7 °C)  HR:  [68-96] 68  Resp:  [16-18] 18  BP: (112-143)/(57-74) 143/65  SpO2:  [96 %-98 %] 98 %  GEN:  Lying in bed in NAD   HEENT/NECK: Normocephalic, atraumatic, moist mucous membranes   CARDIAC: Regular rate rhythm, no murmers, no rubs, no gallops  LUNGS:  clear to auscultation, no wheezes, rales, or rhonchi  ABDOMEN: Soft, non-tender, non-distended, normal active bowel sounds  EXTREMITIES/SKIN:  no calf edema, no calf tenderness to palpation  NEURO:   MENTAL STATUS: Stable aphasia and some motor apraxia but able to follow most simple commands if given enough time; some side to side confusion at times; adequate wakefulness, CN II-XII: EOMI, PERRLA, tongue midline; some visual field loss but stable, Strength/MMT:  Near full througout and NA  PSYCH:  Affect:  Euthymic     HPI:   Per admitting provider  \"Constanza Kamara is a 58 y o  male with history of prediabetes, hypertension, depression, urinary retention on Flomax, carotid stenosis, depression, prior left MCA and right MCA stroke with aphasia and memory deficits now on Aricept who presented to the [address] on 4/26 for AMS witnessed by his wife with abnormal speech that was noted to be similar to his prior strokes  Of note he was recently in the hospital earlier in the month for a left MCA stroke  He also had a right MCA stroke status post thrombectomy back in 2019  He is a prior smoker and quit at the time of his initial stroke  Additionally he had hyponatremia which was felt to be due to HCTZ use and potentially poor oral intake, chronic low back pain  He was seen by neurology as well as neurosurgery and eventually switched from his Eliquis/aspirin to aspirin and Brilinta  It was felt not to be cardioembolic by neurosurgery  He was also not given TNK given his recent Eliquis use when he arrived    He had imaging prior " "on his last admission however now with new focus of diffusion abnormality in the right frontal parietal region and in the left periatrial and parieto-occipital region  No acute hemorrhage was seen  Had a carotid Doppler showing LICA 30-92%/GFRV <03%   IVY was done and did not show any PFO or thrombus  Given that patient was on Plavix when he had his stroke on 04/16/2023, he was placed on Jessica Helm was a question of vasculitis as the etiology but Neurosurgery saw him in consult and felt that it was likely related to atherosclerotic and carotid disease and they stopped the Eliquis and placed back on ASA   He had a cerebral arteriogram on 5/4 23 with a left ICA PTA/stent placement   There was no vasculitis noted    Per Neurology full anticoagulation did not need to be restarted  The patient was evaluated by the Rehabilitation team and deemed an appropriate candidate for comprehensive inpatient rehabilitation and admitted to the [facility] on 5/12/2023  2:35 PM\"    Condition at Discharge: stable     Discharge instructions/Information to patient and family:   See after visit summary for information provided to patient and family  Provisions for Follow-Up Care:  See after visit summary for information related to follow-up care and any pertinent home health orders  Disposition: SNF - Complete Care at [facility] Readmission:  No    Discharge Statement   Total time spent [address], counseling patient (or patient/family) on condition, medication, rehabilitation/medical plan, and coordinating care on day of discharge: at least 45 minutes  Greater than 50% of the total time was spent examining patient, answering all questions, directly discussing plan of care and post-discharge instructions with patient (or patient and family) some of which specifically related to recent CVA  Additional time spent on coordinating care and other discharge activities      Discharge Medications:  See after " visit summary for reconciled discharge medications provided to patient and family

## 2023-06-06 NOTE — TRANSPORTATION MEDICAL NECESSITY
"Section I - General Information    Name of Patient: Reuben Aguayo                 : 1960    Medicare #: [id number]  Transport Date: 23 (PCS is valid for round trips on this date and for all repetitive trips in the 60-day range as noted below )  Origin: [address]: complete care at [facility]  Is the pt's stay covered under Medicare Part A (PPS/DRG)   [x]     Closest appropriate facility? If no, why is transport to more distant facility required? If hospice pt, is this transport related to pt's terminal illness? NA       Section II - Medical Necessity Questionnaire  Ambulance transportation is medically necessary only if other means of transport are contraindicated or would be potentially harmful to the patient  To meet this requirement, the patient must either be \"bed confined\" or suffer from a condition such that transport by means other than ambulance is contraindicated by the patient's condition  The following questions must be answered by the medical professional signing below for this form to be valid:    1)  Describe the MEDICAL CONDITION (physical and/or mental) of this patient AT [address] [place] [address] that requires the patient to be transported in an ambulance and why transport by other means is contraindicated by the patient's condition:multiple strokes    2) Is the patient \"bed confined\" as defined below? Yes  To be \"be confined\" the patient must satisfy all three of the following conditions: (1) unable to get up from bed without Assistance; AND (2) unable to ambulate; AND (3) unable to sit in a chair or wheelchair  3) Can this patient safely be transported by car or wheelchair van (i e , seated during transport without a medical attendant or monitoring)?    No    4) In addition to completing questions 1-3 above, please check any of the following conditions that " apply*:   *Note: supporting documentation for any boxes checked must be maintained in the patient's medical records  If hosp-hosp transfer, describe services needed at 2nd facility not available at 1st facility? Unable to sit in a chair or wheelchair due to decubitus ulcers or other wounds       Section III - Signature of Physician or Healthcare Professional  I certify that the above information is true and correct based on my evaluation of this patient, and represent that the patient requires transport by ambulance and that other forms of transport are contraindicated  I understand that this information will be used by the Centers for Medicare and Medicaid Services (CMS) to support the determination of medical necessity for ambulance services, and I represent that I have personal knowledge of the patient's condition at time of transport  []  If this box is checked, I also certify that the patient is physically or mentally incapable of signing the ambulance service's claim and that the institution with which I am affiliated has furnished care, services, or assistance to the patient  My signature below is made on behalf of the patient pursuant to 42 CFR §424 36(b)(4)  In accordance with 42 CFR §424 37, the specific reason(s) that the patient is physically or mentally incapable of signing the claim form is as follows: Brenda Ruffin of Physician* or Healthcare Professional______________________________________________________________  Signature Date 06/06/23 (For scheduled repetitive transports, this form is not valid for transports performed more than 60 days after this date)    Printed Name & Credentials of Physician or Healthcare Professional (MD, DO, RN, etc )________________________________  *Form must be signed by patient's attending physician for scheduled, repetitive transports   For non-repetitive, unscheduled ambulance transports, if unable to obtain the signature of the attending physician, any of the following may sign (choose appropriate option below)  [] Physician Assistant []  Clinical Nurse Specialist []  Registered Nurse  []  Nurse Practitioner  [x] Discharge Planner

## 2023-06-06 NOTE — PROGRESS NOTES
"Internal Medicine Progress Note  Patient: Sudeep Martel  Age/sex: 58 y o  male  Medical Record #: [id number]      ASSESSMENT/PLAN: (Interval History)  Sudeep Martel is seen and examined and management for following issues:    Acute multifocal ischemic strokes/recurrent left hemispheric infarction  • Occurred in different arterial distributions  • IVY was negative for thrombus/PFO  • Felt not to be vasculitis and negative by a-gram 5/4/23  • CTH 5/29 = done after he/wife felt he had a decline in his speech  Was abnormal with possible new infarcts  • MRI brain 5/30 = some new areas of acute to subacute left parietal/occipital infarcts; +NPH noted  • CTH/CTA 5/31/23 = \"Subacute ischemia within the left hemisphere without mass effect or taiwo hemorrhagic transformation   Advanced chronic microangiopathic change within the brain parenchyma   Stable atherosclerotic disease of the right carotid bifurcation including the ICA origin and midportion of the bulb, approximately 60 to 70% at the point of maximal stenosis   A carotid stent has been placed on the left with only mild residual narrowing within the proximal internal carotid artery   Stable severe stenosis of the distal right M1 segment with decreased size and number of M2 branches\"    • Neurology added Pletal onto the ASA and Brilinta on 6/1/23 for recurrent left hemispheric infarction with unclear etio  • Will need to followup in [facility] clinic as OP     Left ICA stenosis  • Was noted to have all of the CVAs in last month have been in left anterior circulation  • S/p PTA/stent 5/4/23  • Plan was to recheck carotid doppler 6 weeks and see NS but 2/2 inc expresive aphasia the above workup was done  • Continue AP/statin     Right ICA stenosis  • To followup with Vasc surgery as OP  • Continue APs/statin     LOOP recorder  • Was placed 3/2019  • Neuro wants it to be replaced = for OP to Cardiology     HTN  • Home:  Toprol 100mg qd/Norvasc 10mg qd/Losartan 50mg qd/HCTZ " 25mg qd/Hydralazine 25mg TID  • Here:  Toprol 100mg qd/Losartan 50mg qd  • stable     Pre DM  • HbA1C 5 8  • Takes Metformin at home but currently not on it in hospital  • Accuchecks were stable and dc'd in the hospital  • Monitor FBS with BMPs and continue DM diet  •  on 6/5/23     Depression  • Continue Celexa as at home     Memory loss  • Continue Aricept as at home  • He has had memory issues since his CVA in 2019     Urine retention/hematuria  • Has jo placed 5/8/23  • VT 5/18 failed and jo replaced by Urology 5/19/23  • Continue Flomax  • Found to have hematuria 6/3/23  Suspect traumatic cause with jo getting yanked which resolved  •  VT started 6/5/23     Chronic LBP  • MRI showed advanced multilevel spondylosis, slightly progressed on the right at L2-3 compared to the prior study but otherwise stable  • Pain management per PMR     AAA  • Found on L-spine MRI  • Measures 3 4 cm  • OP followup     Hyponatremia  • Stopped HCTZ 5/16/23  • Resolved off of HCTZ and had been given IVF     Leg cramps  • Per PMR     UTI  • Cx = >100,000 Enterococcus faecalis and 20,000-29,000 mixed contaminants x 2  • Has never had fever   • S/p Levaquin 750mg qd x 5 on 6/5/23     Thyroid nodules  • Found on CTA H/N  • Thyroid U/S 6/1/23 = right upper pole nodule 1 6 x 1 0 x 1 4 cm --> bx was recommended  To be done as OP        Discharge date:  today 6/6/23       The above assessment and plan was reviewed and updated as determined by my evaluation of the patient on 6/6/2023      Labs:   Results from last 7 days   Lab Units 06/05/23  0507 06/01/23  0547   HEMATOCRIT % 34 7* 36 0*   HEMOGLOBIN g/dL 12 0 12 4   PLATELETS Thousands/uL 256 252   WBC Thousand/uL 8 66 8 32     Results from last 7 days   Lab Units 06/05/23  0507 06/01/23  0547   BUN mg/dL 16 15   CALCIUM mg/dL 9 0 9 1   CHLORIDE mmol/L 110* 111*   CO2 mmol/L 24 24   CREATININE mg/dL 1 05 0 93   POTASSIUM mmol/L 3 5 3 6   SODIUM mmol/L 135 138 Review of Scheduled Meds:  Current Facility-Administered Medications   Medication Dose Route Frequency Provider Last Rate   • acetaminophen  975 mg Oral TID PRN Aria Belcher MD     • aspirin  81 mg Oral Daily Jaylon Dukes DO     • atorvastatin  40 mg Oral Daily Jaylon Dukes, DO     • baclofen  5 mg Oral BID PRN Aria Belcher MD     • bisacodyl  10 mg Rectal Daily PRN Jaylon Dukes, DO     • calcium carbonate  1,000 mg Oral Daily PRN Jaylon Dukes DO     • cilostazol  100 mg Oral BID AC Aria Belcher MD     • citalopram  20 mg Oral Daily Jaylon Dukes, DO     • donepezil  5 mg Oral BID Jaylon Dukes, DO     • heparin (porcine)  5,000 Units Subcutaneous [facility] Jaylon Dukes, DO     • lidocaine  1 patch Topical Daily Jaylon Dukes DO     • lidocaine   Topical Q4H PRN Aria Belcher MD     • losartan  50 mg Oral Daily Aria Belcher MD     • menthol-methyl salicylate   Apply externally 4x Daily PRN Tyler Xavier MD     • metoprolol succinate  100 mg Oral Daily Aria Belcher MD     • ondansetron  4 mg Oral Q6H PRN Jaylon Dukes DO     • oxyCODONE  5 mg Oral Q6H PRN Jaylon Dukes DO     • oxyCODONE  2 5 mg Oral Q6H PRN Jaylon Dukes DO     • pantoprazole  20 mg Oral Early Morning Jaylon Dukes DO     • polyethylene glycol  17 g Oral QAM Jaylon Dukes DO     • senna  2 tablet Oral Daily PRN Jaylon Dukes DO     • senna-docusate sodium  2 tablet Oral BID Jaylon Dukes DO     • tamsulosin  0 4 mg Oral Daily With Ruthy Holstein, MD     • ticagrelor  90 mg Oral Q12H [address] Jaylon Dukes, DO     • traZODone  50 mg Oral HS Aria Belcher MD         Subjective/ HPI: Patient seen and examined  Patients overnight issues or events were reviewed with nursing or staff during rounds or morning huddle session  New or overnight issues include the following:     No new or overnight issues    Offers no complaints    ROS:   A 10 point ROS was performed; "negative except as noted above  Imaging:     US thyroid   Final Result by Yamile Kiran MD (06/01 1421)      The following meet current ACR criteria for recommending ultrasound guided biopsy:   - RIGHT upper pole nodule measuring 1 6 x 1 0 x 1 4 cm         Reference: ACR Thyroid Imaging, Reporting and Data System (TI-RADS): White Paper of the [employer]  J AM Aram Radiol 0988;29:[id number]  (additional recommendations based on American Thyroid Association 2015 guidelines )      The study was marked in [facility] for immediate notification  Workstation performed: [id number]         CTA head and neck w wo contrast   Final Result by Reynaldo Childs DO (05/31 1641)      CT brain: Subacute ischemia within the left hemisphere without mass effect or taiwo hemorrhagic transformation  Advanced chronic microangiopathic change within the brain parenchyma  CT angiogram: Stable atherosclerotic disease of the right carotid bifurcation including the ICA origin and midportion of the bulb, approximately 60 to 70% at the point of maximal stenosis  A carotid stent has been placed on the left with only mild residual narrowing within the proximal internal carotid artery  Stable severe stenosis of the distal right M1 segment with decreased size and number of M2 branches  Workstation performed: [id number]         MRI brain wo contrast   Final Result by Linda Sanchez MD (05/30 3800)      Similar areas of left parietal occipital recent ischemia/infarct  Some new areas of acute to subacute left parietal/occipital acute to subacute ischemia  Findings suggesting normal pressure hydrocephalus  Findings were marked as \"immediate\"in Epic and will now be related to the ordering physician or covering clinical team by the radiology liaison           Workstation performed: [id number]         CT head wo contrast   Final Result by Milad Greer MD (05/30 6386)      Slight interval progression " and extension of the nonhemorrhagic acute to early subacute left cerebral infarction noted on the recent MRI study  Mild regional sulcal crowding but no significant mass effect on the underlying ventricular system or evidence    of herniation  Redemonstrated disproportionate ventriculomegaly to sulcal prominence which can be seen in patients with normal pressure hydrocephalus  Background cerebral white matter changes consistent with moderate chronic microangiopathic disease  The study was marked in [facility] for immediate notification  Workstation performed: [id number]             *Labs /Radiology studies reviewed  *Medications reviewed and reconciled as needed  *Please refer to order section for additional ordered labs studies  *Case discussed with primary attending during morning huddle case rounds    Physical Examination:  Vitals:   Vitals:    06/05/23 1357 06/05/23 2009 06/06/23 0536 06/06/23 1041   BP: 132/74 122/57 143/65 142/82   BP Location: Right arm Right arm Right arm    Pulse: 70 96 68 78   Resp: 17 16 18    Temp: 97 8 °F (36 6 °C) 98 2 °F (36 8 °C) 98 1 °F (36 7 °C)    TempSrc: Oral Oral Oral    SpO2: 97% 96% 98%    Weight:           General Appearance: no distress, conversive  HEENT:  External ear normal   Nose normal w/o drainage  Mucous membranes are moist  Oropharynx is clear  Conjunctiva clear w/o icterus or redness  Neck:  Supple, normal ROM  Lungs: BBS without crackles/wheeze/rhonchi; respirations unlabored with normal inspiratory/expiratory effort  No retractions noted  On RA  CV: regular rate and rhythm; no rubs/murmurs/gallops, PMI normal   ABD: Abdomen is soft  Bowel sounds all quadrants  Nontender with no distention      EXT: no edema  Skin: normal turgor, normal texture, no rashes  Psych: affect normal, mood normal  Neuro: AA; expressive > receptive aphasia     The above physical exam was reviewed and updated as determined by my evaluation of the patient on 6/6/2023  Invasive Devices     None                    VTE Pharmacologic Prophylaxis: Heparin  Code Status: Level 1 - Full Code  Current Length of Stay: 25 day(s)      Total time spent:  30 minutes with more than 50% spent counseling/coordinating care  Counseling includes discussion with patient re: progress  and discussion with patient of his/her current medical state/information  Coordination of patient's care was performed in conjunction with primary service  Time invested included review of patient's labs, vitals, and management of their comorbidities with continued monitoring  In addition, this patient was discussed with medical team including physician and advanced extenders  The care of the patient was extensively discussed and appropriate treatment plan was formulated unique for this patient  Medical decision making for the day was made by supervising physician unless otherwise noted in their attestation statement  ** Please Note:  voice to text software may have been used in the creation of this document   Although proof errors in transcription or interpretation are a potential of such software**

## 2023-06-06 NOTE — PLAN OF CARE
Problem: PAIN - ADULT  Goal: Verbalizes/displays adequate comfort level or baseline comfort level  Description: Interventions:  - Encourage patient to monitor pain and request assistance  - Assess pain using appropriate pain scale  - Administer analgesics based on type and severity of pain and evaluate response  - Implement non-pharmacological measures as appropriate and evaluate response  - Consider cultural and social influences on pain and pain management  - Notify physician/advanced practitioner if interventions unsuccessful or patient reports new pain  6/6/2023 1151 by Yola Leonard RN  Outcome: Completed  6/6/2023 1150 by Yola Leonard RN  Outcome: Adequate for Discharge     Problem: INFECTION - ADULT  Goal: Absence or prevention of progression during hospitalization  Description: INTERVENTIONS:  - Assess and monitor for signs and symptoms of infection  - Monitor lab/diagnostic results  - Monitor all insertion sites, i e  indwelling lines, tubes, and drains  - Monitor endotracheal if appropriate and nasal secretions for changes in amount and color  - [place] appropriate cooling/warming therapies per order  - Administer medications as ordered  - Instruct and encourage patient and family to use good hand hygiene technique  - Identify and instruct in appropriate isolation precautions for identified infection/condition  6/6/2023 1151 by Yola Leonard RN  Outcome: Completed  6/6/2023 1150 by Yola Leonard RN  Outcome: Adequate for Discharge  Goal: Absence of fever/infection during neutropenic period  Description: INTERVENTIONS:  - Monitor WBC    6/6/2023 1151 by Yola Leonard RN  Outcome: Completed  6/6/2023 1150 by Yola Leonard RN  Outcome: Adequate for Discharge     Problem: SAFETY ADULT  Goal: Patient will remain free of falls  Description: INTERVENTIONS:  - Educate patient/family on patient safety including physical limitations  - Instruct patient to call for assistance with activity   - Consult OT/PT to assist with strengthening/mobility   - Keep Call bell within reach  - Keep bed low and locked with side rails adjusted as appropriate  - Keep care items and personal belongings within reach  - Initiate and maintain comfort rounds  - Make Fall Risk Sign visible to staff  - Offer Toileting , in advance of need  - Initiate/Maint  - Obtain necessary fall risk management equ  - Apply yellow socks and bracelet for high fall risk patients  - Consider moving patient to room near nurses station  6/6/2023 1151 by Saba Gao RN  Outcome: Completed  6/6/2023 1150 by Saba Gao RN  Outcome: Adequate for Discharge  Goal: Maintain or return to baseline ADL function  Description: INTERVENTIONS:  -  Assess patient's ability to carry out ADLs; assess patient's baseline for ADL function and identify physical deficits which impact ability to perform ADLs (bathing, care of mouth/teeth, toileting, grooming, dressing, etc )  - Assess/evaluate cause of self-care deficits   - Assess range of motion  - Assess patient's mobility; develop plan if impaired  - Assess patient's need for assistive devices and provide as appropriate  - Encourage maximum independence but intervene and supervise when necessary  - Involve family in performance of ADLs  - Assess for home care needs following discharge   - Consider OT consult to assist with ADL evaluation and planning for discharge  - Provide patient education as appropriate  6/6/2023 1151 by Saba Gao RN  Outcome: Completed  6/6/2023 1150 by Saba Gao RN  Outcome: Adequate for Discharge  Goal: Maintains/Returns to pre admission functional level  Description: INTERVENTIONS:  - Perform BMAT or MOVE assessment daily    - Set and communicate daily mobility goal to care team and patient/family/caregiver  - Collaborate with rehabilitation services on mobility goals if consulted  - Perform Range ofes a day  - Reposition patient ours    - Dangle juana  - Stand pat  - Ambulate patie  - Out of bed to  - Out of bed for nasrin  - Out of bed for toileting  - Record patient progress and toleration of activity level   6/6/2023 1151 by Scottie Canchola RN  Outcome: Completed  6/6/2023 1150 by Scottie Canchola RN  Outcome: Adequate for Discharge     Problem: DISCHARGE PLANNING  Goal: Discharge to home or other facility with appropriate resources  Description: INTERVENTIONS:  - Identify barriers to discharge w/patient and caregiver  - Arrange for needed discharge resources and transportation as appropriate  - Identify discharge learning needs (meds, wound care, etc )  - Arrange for interpretive services to assist at discharge as needed  - Refer to Case Management Department for coordinating discharge planning if the patient needs post-hospital services based on physician/advanced practitioner order or complex needs related to functional status, cognitive ability, or social support system  6/6/2023 1151 by Scottie Canchola RN  Outcome: Completed  6/6/2023 1150 by Scottie Canchola RN  Outcome: Adequate for Discharge     Problem: Prexisting or High Potential for Compromised Skin Integrity  Goal: Skin integrity is maintained or improved  Description: INTERVENTIONS:  - Identify patients at risk for skin breakdown  - Assess and monitor skin integrity  - Assess and monitor nutrition and hydration status  - Monitor labs   - Assess for incontinence   - Turn and reposition patient  - Assist with mobility/ambulation  - Relieve pressure over bony prominences  - Avoid friction and shearing  - Provide appropriate hygiene as needed including keeping skin clean and dry  - Evaluate need for skin moisturizer/barrier cream  - Collaborate with interdisciplinary team   - Patient/family teaching  - Consider wound care consult   6/6/2023 1151 by Scottie Canchola RN  Outcome: Completed  6/6/2023 1150 by Scottie Canchola RN  Outcome: Adequate for Discharge     Problem: MOBILITY - ADULT  Goal: Maintain or return to baseline ADL function  Description: INTERVENTIONS:  -  Assess patient's ability to carry out ADLs; assess patient's baseline for ADL function and identify physical deficits which impact ability to perform ADLs (bathing, care of mouth/teeth, toileting, grooming, dressing, etc )  - Assess/evaluate cause of self-care deficits   - Assess range of motion  - Assess patient's mobility; develop plan if impaired  - Assess patient's need for assistive devices and provide as appropriate  - Encourage maximum independence but intervene and supervise when necessary  - Involve family in performance of ADLs  - Assess for home care needs following discharge   - Consider OT consult to assist with ADL evaluation and planning for discharge  - Provide patient education as appropriate  6/6/2023 1151 by Scottie Canchola RN  Outcome: Completed  6/6/2023 1150 by Scottie Canchola RN  Outcome: Adequate for Discharge  Goal: Maintains/Returns to pre admission functional level  Description: INTERVENTIONS:  - Perform BMAT or MOVE assessment daily    - Set and communicate daily mobility goal to care team and patient/family/caregiver  - Collaborate with rehabilitation services on mobility goals if consulted  - Perform Range ofmes a day  - Reposition urs  - Dangle patien  - Stand patient   - Ambulate p  - Out of bed to ch  - Out of bed for me  - Out of bed for toileting  - Record patient progress and toleration of activity level   6/6/2023 1151 by Scottie Canchola RN  Outcome: Completed  6/6/2023 1150 by Scottie Canchola RN  Outcome: Adequate for Discharge     Problem: Nutrition/Hydration-ADULT  Goal: Nutrient/Hydration intake appropriate for improving, restoring or maintaining nutritional needs  Description: Monitor and assess patient's nutrition/hydration status for malnutrition  Collaborate with interdisciplinary team and initiate plan and interventions as ordered  Monitor patient's weight and dietary intake as ordered or per policy   Utilize nutrition screening tool and intervene as necessary  Determine patient's food preferences and provide high-protein, high-caloric foods as appropriate       INTERVENTIONS:  - Monitor oral intake, urinary output, labs, and treatment plans  - Assess nutrition and hydration status and recommend course of action  - Evaluate amount of meals eaten  - Assist patient with eating if necessary   - Allow adequate time for meals  - Recommend/ encourage appropriate diets, oral nutritional supplements, and vitamin/mineral supplements  - Order, calculate, and assess calorie counts as needed  - Recommend, monitor, and adjust tube feedings and TPN/PPN based on assessed needs  - Assess need for intravenous fluids  - Provide specific nutrition/hydration education as appropriate  - Include patient/family/caregiver in decisions related to nutrition  6/6/2023 1151 by Rhonda Rosenberg RN  Outcome: Completed  6/6/2023 1150 by Rhonda Rosenberg RN  Outcome: Adequate for Discharge

## 2023-06-06 NOTE — DISCHARGE INSTR - AVS FIRST PAGE
DISCHARGE INSTRUCTIONS: Lula Feliciano 64 22    Bring these instructions with you to your [facility] Provider so they can order and follow-up any additional lab work or imaging recommended at time of discharge  Resume follow-up with all your prior providers that you have established care prior to this hospitalization  Discuss with SNF and primary care physician (PCP) if you have additional questions  It  is your caregivers responsibility to obtain follow-up MEDICATION REFILLS  As indicated through your Primary Care Physician (PCP) and other outpatient specialty provider(s) after discharge  Please follow-up with your PCP as soon as possible after discharge to set-up follow-up management and when appropriate refills  You have been determined to have some cognitive impairments  - It is YOUR CAREGIVER'S RESPONSIBILITY to ensure appropriate follow-up which includes:  - APPOINTMENTS are scheduled and safe transportation is arranged  - LABS and IMAGING are completed  - MEDICATION MANAGEMENT at home is carried out appropriately     You remain a fall and injury risk which could be severe  - Your risk of fall has decreased however since admission to acute rehab  - Appropriate supervision +/- assistance as instructed during your rehab course is recommended to decrease risk of fall and injury  - Continue skilled therapy as discussed after discharge to further decrease this risk    If you (or your health care proxy) have any questions or concerns regarding your acute rehabilitation stay including issues with medications, rehabilitation, and follow-up plan, please call:          [address] [place] [address] at [phone] or [phone]      Should you develop fevers, chills, new weakness, changes in sensation, difficulty speaking, facial weakness, confusion, shortness of breath, chest pain, or other concerning symptoms please call "911 and/or obtain transportation to nearest ER immediately  Should you develop feelings of significant hopelessness, severe depression, severe anxiety, or suicide you should call 911 or obtain transportation to nearest ER  24-7 Nationwide suicide and crisis lifeline call \"259\"  National Suicide Prevention Lifeline is 6-376-654-265-287-0787 and is available 24 hrs/7 days a week  [facility] [phone] is available 24 hrs/7 days for mental health  [facility] AT [place] [phone] is available 24 hrs/7 days for mental health   [facility], [place]  [phone]    PHYSICIANS to see:  Please see your doctors listed in the follow up providers section of your discharge paperwork, and take the discharge paperwork with you to your appointments  LAB WORK recommended after discharge: Follow-up lab work by SNF provider, PCP, and outpatient providers:    Repeat Thrombosis panel - history of recurrent strokes and low antithrombin 3 level after stroke - obtain order thru neurology, PCP, or SNF provider with about 1 month    CBC and BMP to monitor hemoglobin, white blood cell count, electrolytes and kidney function within 1 week     IMAGING, ADDITIONAL FINDINGS and ISSUES to follow-up:  Check under the \"DISCHARGE PROVIDER\" section of these DISCHARGE INSTRUCTIONS for provider contact information and specific issues as well  Also, obtain the following management through skilled nursing facility provider and later primary care physician and outpatient specialists (call or see these providers so they can order the tests and set-up appropriate follow-up management):    Recurrent strokes and bilateral carotid stenosis:  Follow-up with neurology  Follow-up with neurosurgery (interventional) - Dr Rupesh Tijerina   - Within 1 month     Possible NPH (Normal pressure hydrocephalus):   Follow-up with neurology and neurosurgery ([facility]) within about 1 month    Urinary retention:  Follow-up urology " Multiple Thyroid nodules: Follow-up with PCP (and SNF) provider and set-up ultrasound biopsy of nodule as there is risk of cancer within 2 weeks of discharge  Referral to endocrine at their discretion  Incidental finding on CTA 6/1  SOFT TISSUES OF THE NECK: Several hypodense nodules are scattered within the thyroid gland  The largest of these is located on the right measuring 1 5 cm in craniocaudad dimension  This has gradually increased in size when compared with prior examination;  from 2019  Incidental discovery of one or more thyroid nodule(s) measuring more than 1 5 cm and without suspicious features is noted in this patient who is above 28years old; according to guidelines published in the February 2015 white paper on incidental thyroid nodules in the Journal of the [employer] of Radiology [facility]), further characterization with thyroid ultrasound is recommended  - Thyroid US 6/1  FINDINGS:  Normal homogeneous smooth echotexture  Right lobe: 5 1 x 2 2 x 2 1 cm  Volume 11 5 mL  Left lobe:  4 5 x 2 1 x 1 2 cm  Volume 5 4 mL  Isthmus: 0 3  cm  Nodule #1  Image 25  RIGHT upper pole nodule measuring 1 6 x 1 0 x 1 4 cm  No priors for comparison  COMPOSITION:  2 points, solid or almost completely solid   ECHOGENICITY:  2 points, hypoechoic  SHAPE:  0 points, wider-than-tall  MARGIN: 0 points, smooth  ECHOGENIC FOCI:  0 points, none or large comet-tail artifacts  TI-RADS Classification: TR 4 (4-6 points),  FNA if > 1 5 cm  Follow if > 1cm  Nodule #2  Image 30  RIGHT lower pole nodule measuring 1 5 x 0 9 x 1 5 cm  No priors for comparison  COMPOSITION:  1 point, mixed cystic and solid  ECHOGENICITY:  1 point, hyperechoic or isoechoic  SHAPE:  0 points, wider-than-tall  MARGIN: 0 points, smooth  ECHOGENIC FOCI:  0 points, none or large comet-tail artifacts  TI-RADS Classification: TR 2 (2 points), Not suspicious  No FNA       Additional cystic nodules do not meet criteria for follow-up  IMPRESSION:  The following meet current ACR criteria for recommending ultrasound guided biopsy:  - RIGHT upper pole nodule measuring 1 6 x 1 0 x 1 4 cm  - Discussed with wife who is aware and will set-up follow-up with PCP unless in SNF for extended period than thru SNF provider      Abdominal aortic aneurysm:   Incidental finding on 4/17/23 CT - Minimal aneurysmal dilatation of infrarenal abdominal aorta up to 3 3 cm  Follow-up with vasc surgery within 1 month after discharge     Excessive delay or lack of appropriate follow-up could potentially increase your risk of complications which could be severe and even life-threatening  It is you or your caregiver's responsibility to ensure these tests are ordered by your outpatient providers and followed up with accordingly  SKIN CARE INSTRUCTIONS to follow:    Monitor skin for increased redness or breadown and promptly notify your physician should these develop    WOUND CARE INSTRUCTIONS to follow:    Due to the following you are at increased risk of skin breakdown/wounds/worsening wounds:  - Impaired mobility  - Impaired nutrition/intake/low albumin  - Medical co-morbidities    Buttocks/Sacrum  Turn as full as possible off sacrum/buttocks every 2 hours  Use Cushion while in chair or wheelchair  Weight shift every 10-15 minutes while in chair  Keep skin clean and dry as possible  Remove wet or soiled clothing/linens promptly  Use barrier cream or similar 2 times per day   Monitor skin for increased breakdown which you are at risk of and notify nursing, PCP, or other physician providers should this occur right away    Heels/bony edges of feet  Float heels off edge of pillow for added pressure relief    Monitor heels and bony protuberances and notify physician or nursing for any increased redness, bogginess, or breakdown      WEIGHTBEARING/ACTIVITY PRECAUTIONS to follow:    24 hours/7 days per week supervision by caregiver required    Weightbearing as tolerated  Driving restrictions: You are recommended against driving  Work restrictions: You should NOT return to work (if working) until cleared by an outpatient physician  You should not operate heavy machinery (if applicable) until cleared by an outpatient physician  Alcohol restrictions: You are recommended to not drink alcohol at this time unless cleared by an outpatient physician  Drinking alcohol in your current functional condition can increase your risk of injury which could be severe  Drinking alcohol given your current health problems can lead to increased medical complications which could be severe  Combining alcohol with your current medications can increase your risk of injury which could be severe  Smoking restrictions: You are recommended to not smoke nicotine  Smoking increases your risk of heart attack, stroke, emphysema/COPD, and lung cancer  Illicit drug use restrictions: You should not use cocaine, crack, speed/methamphetamine, heroin, LSD, ecstasy or other illicit substances as they can increase your risk of injury and medical complication which could be severe and lead to sub-optimal functional recovery  MEDICATIONS:  Please see a full list of your medications outlined in the After Visit Summary that is attached to these Discharge Instructions  Please note changes may have been made to your medications please refer to your discharge paperwork for your current medications and take this list with you to all your doctors appointments for your doctors to review  Please do not resume a home medication unless the medication reconciliation sheet indicates to do so, please do not assume that a medication that you were given a prescription for is the same as a medication you have at home based on both medications having the same name as dosages and frequency may have changed        Unless specifically noted in your medication list provided to you in your discharge paper work do not resume prior vitamins, minerals, or supplements you may have been taking prior to your hospitalization unless instructed by an outpatient physician in the future  Discuss with your primary care at next visit if applicable  NSAID Warning:  Please avoid NSAID (including but not limited to advil, aleve, motrin, naproxen, ibuprofen, mobic, meloxicam, diclofenac etc) medications as NSAID medications may increase your risk of bleeding (which can be life-threatening), stroke  Blood thinners prescribed to optimize long and short term health and decrease risk of complications but with risks related to bleeding and other complications  Aspirin instructions  TAKE aspirin daily unless instructed otherwise by a doctor  This medication will need to be managed by your outpatient physician(s) after discharge  Follow-up with the appropriate provider as soon as possible to ensure appropriate use  This is a blood thinner  It is being recommended for use by you (or your family) to decrease the risk of clotting which can be severely disabling and even life-threatening  Even when provided as recommended it can cause severe disabling and even life-threatening bleeding  Too much or too little of this blood thinner further increases your risk of this medication causing serious and even life-threatening complications such as severe bleeding, clots, strokes, and death  With that said, at this time based on best available evidence and consensus agreement, your physicians recommend you take aspirin based on your overall risks and benefits in your specific medical situation  If you (or your family/caregiver) notice black stools, bloody stools, vomit blood, develop new weakness, slurred speech, confusion or have any other concerning symptoms call 911 or obtain transportation to nearest emergency room immediately       Mariah ([address]) instructions  Take Mariah (Brilinta) every 12 hours unless instructed otherwise by a doctor  This medication will need to be managed by your outpatient physician(s) after discharge  Follow-up with the appropriate provider as soon as possible to ensure appropriate use  This is a blood thinner  It is being recommended for use by you (or your family) to decrease the risk of clotting which can be severely disabling and even life-threatening  Even when provided as recommended it can cause severe disabling and even life-threatening bleeding  Too much or too little of this blood thinner further increases your risk of this medication causing serious and even life-threatening complications such as severe bleeding, clots, strokes, and death  With that said, at this time based on best available evidence and consensus agreement, your physicians recommend you take Ticagelor ([place])  based on your overall risks and benefits in your specific medical situation  If you (or your family/caregiver) notice black stools, bloody stools, vomit blood, develop new weakness, slurred speech, confusion or have any other concerning symptoms call 911 or obtain transportation to nearest emergency room immediately  Pletal (cilostazol) instructions  Take Pletal (cilostazol) 100mg twice per day unless instructed otherwise by a doctor  This medication will need to be managed by your outpatient physician(s) after discharge  Follow-up with the appropriate provider as soon as possible to ensure appropriate use  This is a blood thinner  It is being recommended for use by you (or your family) to decrease the risk of clotting which can be severely disabling and even life-threatening  Even when provided as recommended it can cause severe disabling and even life-threatening bleeding    Too much or too little of this blood thinner further increases your risk of this medication causing serious and even life-threatening complications such as severe bleeding, clots, strokes, and death  With that said, at this time based on best available evidence and consensus agreement, your physicians recommend you take clopidogrel (Plavix) based on your overall risks and benefits in your specific medical situation  If you (or your family/caregiver) notice black stools, bloody stools, vomit blood, develop new weakness, slurred speech, confusion or have any other concerning symptoms call 911 or obtain transportation to nearest emergency room immediately  Sedating Medications with increased risk of complications:    Baclofen pain/muscle spasm medication has been used to help your acute pain and spasms  You tolerated this medication adequately during your recent hospital stay  - Do not take with alcohol (or marijuana/cannabis) while on this medication as this can cause increased confusion, breathing problems, falls, and severe injury  - Follow-up with your primary care physician or SNF provider     Trazodone has been used to help you sleep  You tolerated this medication adequately during your recent hospital stay  Nevertheless, the medication can increase your risk of confusion and falls (injury) which we have discussed with your prior to discharge  If you developing any concerning symptoms stop medication and notify your physician  - Take 50 mg at bedtime as needed for sleep  - Do not take with alcohol (or marijuana/cannabis) while on this medication as this can cause increased confusion, breathing problems, falls, and severe injury  - Follow-up with your primary care physician or SNF provider     Psychotropic Medications (Medications intended to improve mental, cognitive, and functional health)  You were evaluated recently and found to have signs and symptoms of depression anxiety and insomnia (impaired sleep) that can negatively impact your function and quality of life        You were managed by both medications and non-pharmacologic (non-medication) methods to try to improve mood sleep pain, cognition, function, and quality of life  Non-pharmacologic methods included supportive counseling, neuropsychology consultation and management during course, discussion of deep breathing/relaxation/behavioral management techniques, and skilled functional therapies  Psychiatric(psychotropic) medications were adjusted during acute rehabilitation course  To attempt to improve  insomnia(impaired sleep) and mood wakefulness Trazodone was started (in addition to continuing Celexa) and adjusted with good results  Medications were adjusted during your course based on their effectiveness, tolerability, and safety  You tolerated the Trazodone and Celexa and medication(s) adequately during your course without significant side-effects noted by rehab staff or by yourself (family member)  You have functionally improved significantly and appear to be doing better  Treatment team (rehab physician, internal medicine team, skilled therapists, nursing, and social work) feel you are adequately appropriate for discharge to [facility]  You nevertheless remain at risk for fall, injury, and additional medical and physical complications in the future  Obtain transportation to nearest hospital from family/friends or call 911 for any concerning new symptoms  Please follow-up with recommended discharge plan to optimize your mental and physical health and decrease risk of mental and physical health complications and hospital re-admission  Risk of serotonin syndrome    Taking Taking Trazodone and Celexa may increase risk of serotonin syndrome which can lead to anxiety, restlessness, confusion, and agitation  It may also cause sweating, elevated heart rate, diarrhea, and hypertension but you have so far tolerated them adequately  The benefits from these medications currently appear to outweigh the risks per prior behavioral health/psychiatry recommendations    Nevertheless, if you notice any symptoms notify your physician or obtain transportation to nearest emergency room for evaluation (or call 911)   MEDICAL MANAGEMENT AT HOME/SNF specific to you:    Hypertension Management:    Follow-up with SNF/PCP/family doctor regularly to ensure blood pressure remains adequately controlled  Cardiac Loop recorder (Implanted heart rhythm monitor) IN PLACE and Recommendation to follow-up with Loop recorder be change: device to monitor for atrial fibrillation (abnormal heart rhythm that increases risk of stroke) -  follow-up with cardiology/device clinic to monitor and for follow-up management; call and schedule follow-up appointment after discharge - see your appopriate office listed in the follow up providers section of your discharge paperwork      Urinary retention management (recent jo removed) and now urinary incontinence:  - OVERALL MANAGEMENT PER SNF PROVIDER and UROLOGY AFTER DISCHARGE FROM [facility]:    - Continue condom catheter when initially at nursing facility if available with bladder scans every 4-6 hours to ensure no excessive retention (overflow incontinence) and straight cath as needed for scans >400-450cc; additional management of urinary retention/incontinence at discretion of SNF provider     You recently had your jo removed but remain at risk for re-occurrence of urinary retention (bladder filling up with excessive urine and inability to adequately expel it)  Urinary retention can lead to significant kidney injury and infection which can be serious  If you do not urinate for 8 hours or more or you have the urge to urinate and are unable to, please obtain transportation to nearest emergency room (or urologist office), for likely placement of jo  If you develop fever, chills, sweats, confusion, or other concerning symptoms seek medical management right away  Follow-up with primary care and if needed urology after discharge      Urinary retention can lead to significant kidney injury and infection which can be serious  C[place]ez catheter improves retention and decreases risk of retention but can also increase your risk of infection or skin breakdown which could also be significant  If you develop fever, chills, sweats, confusion, or other concerning symptoms seek medical management right away  Notify PCP and urology if you develop skin or penile/urethral breakdown or redness  Follow-up with primary care and urology after discharge  Cognitive impairment - based on recent assessments, you may have a degree of impaired cognition and are recommended to follow-up with neurology to discuss potential additional work-up and management  Your family has been recommended to assist you with oversight and supervision to decrease risk of fall and injury, optimize day-to-day activities, and to ensure appropriate medical management  Please note a summary of your hospital stay with relevant information for your doctors will try to be sent to them  Please confirm with your doctors at your follow up visits that they have received this summary and have them contact [address] [place] [address] if they have not received them along with any other medical records they may require       Gurpreet Wilson Phone Number:  [phone]

## 2023-06-06 NOTE — ASSESSMENT & PLAN NOTE
Incidental finding 4/17  Minimal aneurysmal dilatation of infrarenal abdominal aorta up to 3 3 cm    Follow-up with vasc surgery

## 2023-06-07 NOTE — CASE MANAGEMENT
Team dc summary - pt made gains but was not able to return home yet due to level of assist  Spouse preferred continued rehab at subacute setting and selected complete care at [facility]  Facility secured auth from [employer]  Transport arranged with [facility] via [employer] for 1300  Pt family facility and staff aware of all dc arrangements

## 2023-06-07 NOTE — PROGRESS NOTES
06/07/23 1338   Hello, [Guardian’s Name / Patient’s Bailey Rosario, this is [Caller Bailey Rosario from [facility], and our clinical care team wanted to check on you / your child after your recent visit to the hospital  It will only take 3-5 minutes  Is this a good time? Discharge Call Type/ Specific Diagnosis: General Call   Call Complete   Attempted Number of Calls 1   Discharge phone call complete?  Does not meet criteria  (D/C to SNF)

## 2023-06-08 NOTE — SPEECH THERAPY NOTE
SLP Discharge Summary    Pt was discharged to subacute level of care for ongoing skilled therapy services  While on the acute rehab center, pt was followed for speech/language and cognitive tx sessions to where pt was making slower and steady progress towards function abilities  In addition to being followed for speech/language/cognitive skills, pt also completed bedside dysphagia assessment to where pt presented with functional oropharyngeal swallow skills across baseline diet of regular solids/thin liquids, but due to difficulty in visual spatial deficits as well as motor planning, pt was to be FULL supervision for meals, not due to being an overt risk of aspiration  Since pt was demonstrating tolerance of least restrictive diet given regular diet/thin liquids, no further dysphagia therapy was warranted, to where high focus given ST services was toward speech/language/cognitive skills  Speech/language evaluation was completed on initial evaluation where pt completed the Bedside WAB in which overall bedside aphasia score deems pt to demonstrate severely impaired language deficits  Additionally, pt also demonstrates Broca's type aphasia as per Bedside Aphasia Classification  Overall, pt is presenting with moderate to sever receptive language deficits and severe expressive language/communication deficits  Pt's speech is characterized by anomia, presence of paraphasias and use of neologisms as pt does present with some components of apraxia of speech  Pt at times able to elicit functional phrases and short sentences but is most often limited by decreased verbal expression  Regarding comprehension, pt benefits from the use of yes/no questions, along with direct commands/instructions   In additional to the above language deficits, pt presents with cognitive linguistic deficits with barriers presenting as deficits in the following areas: attention, processing, STM recall, working memory, [employer] recall, orientation, problem solving, executive functions, safety awareness and insight into deficits  Pt had been making good progress towards his goals, noted by improving in his independent communication skills for basic wants and needs  Pt continued with moderate receptive and moderate to severe expressive deficits at this time  Pt was improving in verbal expression skills of biographical and situational orientation information, benefitting most from phonemic and phrase completion cues  Pt also benefited from simple yes/no questions as well as one step following commands  However written expression skills still required mod to max cues for copying, suspect some Right inattention visual skills impacting this as well  Pt was highly impacted by fatigue with tasks, and benefits from breaks, increased cuing and change of task when increased frustration occurs  Family education was initiated with wife over the phone in regards to current communication strategies- wife reports slow improvement in speech each day  Pt continued to be followed for skilled SLP services targeting receptive and expressive communication needs and was making good progress towards his goals  Pt's overall verbal expression continued to improve but still required moderate assistance with multimodal cuing (sentence completion, initial phoneme, phonetic placement, semantic)  Written expression still continued to be severely impair and difficult to produce with component of motor apraxia as well  Pt's reading comprehension was increasing as well at the word level as well as his auditory comp improving for basic conversation, yes/no questions and following directions  Pt currently functioning at max A problem solving and memory, mod A comprehension and expression  Barriers as well include ecreased ST/working memory, expressive/receptive aphasia, problem solving, safety, Right visual inattention/neglect, and 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 as well   Upon pt's transition to subacute level of care, pt will benefit from continued skilled SLP services targeting speech/language and cognitive linguistic skills to maximize overall functional communicative skills in attempts to decrease caregiver burden over time

## 2023-06-09 ENCOUNTER — TELEPHONE (OUTPATIENT)
Dept: NEUROLOGY | Facility: CLINIC | Age: 63
End: 2023-06-09

## 2023-06-09 NOTE — TELEPHONE ENCOUNTER
Patient wife called, Reford Kussmaul she wants to listen in the patient appointment for 6/13 at 1pm with Dr Shelly Mcguire  [facility] will [facility] coming [facility] he needs medical transport to come to the appointment  Reford Kussmaul cannot come to the appointment due to work  She wants to know what is happening at the appointment because she takes care of him  Reford Kussmaul can be reached at [phone]  I told her Im not sure if this is possible

## 2023-06-13 ENCOUNTER — TELEPHONE (OUTPATIENT)
Dept: ENDOCRINOLOGY | Facility: CLINIC | Age: 63
End: 2023-06-13

## 2023-06-13 ENCOUNTER — OFFICE VISIT (OUTPATIENT)
Dept: NEUROLOGY | Facility: CLINIC | Age: 63
End: 2023-06-13
Payer: COMMERCIAL

## 2023-06-13 VITALS
BODY MASS INDEX: 31.95 KG/M2 | SYSTOLIC BLOOD PRESSURE: 110 MMHG | DIASTOLIC BLOOD PRESSURE: 70 MMHG | HEIGHT: 68 IN | HEART RATE: 70 BPM

## 2023-06-13 DIAGNOSIS — R32 URINARY INCONTINENCE: ICD-10-CM

## 2023-06-13 DIAGNOSIS — I63.9 RECURRENT STROKES (HCC): ICD-10-CM

## 2023-06-13 DIAGNOSIS — R41.3 MEMORY DEFICITS: ICD-10-CM

## 2023-06-13 DIAGNOSIS — R41.3 MEMORY LOSS: ICD-10-CM

## 2023-06-13 DIAGNOSIS — I66.01 MIDDLE CEREBRAL ARTERY STENOSIS, RIGHT: Primary | ICD-10-CM

## 2023-06-13 PROCEDURE — 99215 OFFICE O/P EST HI 40 MIN: CPT | Performed by: PSYCHIATRY & NEUROLOGY

## 2023-06-13 RX ORDER — DONEPEZIL HYDROCHLORIDE 10 MG/1
10 TABLET, FILM COATED ORAL DAILY
Qty: 60 TABLET | Refills: 2 | Status: ON HOLD | OUTPATIENT
Start: 2023-06-13

## 2023-06-13 NOTE — PROGRESS NOTES
Patient ID: Jose E Lemon is a 58 y o  male  Assessment/Plan: This is a 59 y/o  Male who is here as a follow up for history of CVA  MRI brain which I reviewed the images of which showed left posterior MCA stroke  Etiology of the stroke was thought to be symptomatic left ICA stenosis and then underwent left carotid stent  Also has right-sided carotid stenosis 60% to 70%  PLAN:      Diagnoses and all orders for this visit:    Middle cerebral artery stenosis, right    Recurrent strokes ([address] )  Etiology of the CVA -   -for stroke prevention continue with combination of aspirin, brilinta and pletal and atorvastatin  -he is s/p left Carotid stent, and is following up with neurosurgery  -BP goal < 130/80, BP is at goal in the office  -LDL goal <70  -will switch to protonix from omeprazole for GI ulcer disease    -does not smoke at this time   -no snoring  -I advised patient to avoid using NSAIDs for headaches or other pain and to stick to tylenol if needed  -Recommend mediterranean diet & regular exercise regimen atleast 4-5 times a week for 20-30 minutes  -I educated patient/family regarding medication compliance  -     Ambulatory referral to Neurology    Memory deficits  -     Ambulatory referral to Neurology    Urinary incontinence  -urology referral placed  -     Ambulatory Referral to Urology; Future    Memory loss  Start patient on aricept 10mg Po qdaily  Cannot see senior care but once he turns 71, then will refer him    -     donepezil (ARICEPT) 10 mg tablet; Take 1 tablet (10 mg total) by mouth in the morning       Follow up - 3 months  I would be happy to see the patient sooner if any new questions/concerns arise  Patient/Guardian was advised to the call the office if they have any questions and concerns in the meantime       Patient/Guardian does understand that if they have any new stroke like symptoms such as facial droop on one side, weakness/paralysis on either side, speech trouble, numbness on one side, balance issues, any vision changes, extreme dizziness or any new headache, to call 9-1-1 immediately or to proceed to the nearest ER immediately  Subjective:    HPI    This is a 59 y/o M who is here as a hospital follow-up 4/17/2023  He was admitted with altered mental status after taking a muscle relaxant for low back pain  While he was in the ED he was noted to have aphasia and stroke alert was activated  NIH stroke at that time was 5  He had a CT head which was negative for any acute findings he had a CTA neck which did reveal any evidence of large M1 segment stenosis  He has right ICA moderate disease 50 to 60%  MRI brain was done which I reviewed images of which showed left posterior PCA strokes indicative of recurrent embolic disease  IVY was done which showed no PFO  When he had a prior stroke he was recommended a loop recorder placement there was no evidence of A-fib  This is a second event he was recommended to start on Eliquis  Patient was also followed up with cardiology as well  Ended up getting started on aspirin, Brilinta and Pletal   He did undergo left carotid stent  He was noted to have right carotid stenosis 60 to 70%  Patient is doing well overall denies any new TIA or CVA-like symptoms patient compliant medications he is tolerating his medications well without any issues overall he seems to be doing well  Wife states that he still having issues with memory and he seems to be declining as far as not concerned and it seems a lot more forgetful lately  He still able to do his ADLs  The following portions of the patient's history were reviewed and updated as appropriate:   He  has a past medical history of Depression, Diabetes mellitus ([facility] ), Dyslipidemia (03/26/2019), Hyperlipidemia, Hypertension, and Stroke ([facility] )    He   Patient Active Problem List    Diagnosis Date Noted   • Possible NPH (normal pressure hydrocephalus) ([facility] ) 06/02/2023   • Muscle spasms of both lower extremities 06/02/2023   • Multiple thyroid nodules 06/01/2023   • Abnormal laboratory test 05/15/2023   • History of tobacco use 05/12/2023   • Chronic ischemic left MCA stroke 05/12/2023   • Hypokalemia 05/12/2023   • Infrarenal abdominal aortic aneurysm (AAA) without rupture ([facility] ) 05/08/2023   • Tachycardia 05/05/2023   • Prediabetes 04/27/2023   • SIRS (systemic inflammatory response syndrome) (HCC) 04/27/2023   • Chronic anticoagulation 04/26/2023   • Stroke ([facility] ) 04/26/2023   • Hyponatremia 04/26/2023   • Middle cerebral artery stenosis, right 04/17/2023   • Left posterior MCA stroke - etiology unclear at this time 04/17/2023   • Chronic low back pain 04/16/2023   • Hypertensive encephalopathy, transient 01/12/2023   • Snoring 08/10/2020   • Memory deficits 08/10/2020   • Chronic ischemic right MCA stroke 08/06/2019   • Status post placement of implantable loop recorder 04/06/2019   • Type 2 diabetes mellitus ([facility] ) 04/03/2019   • Anxiety and depression 03/29/2019   • Insomnia 03/29/2019   • Fall 03/28/2019   • 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 ([facility] ) 03/28/2019   • Urinary retention 03/27/2019   • At risk for venous thromboembolism (VTE) 03/26/2019   • Hypertriglyceridemia 03/26/2019   • Nicotine dependence 03/26/2019   • Bilateral carotid artery stenosis 03/26/2019   • Presbyopia 03/26/2019   • History of stroke 03/19/2019   • Headache 03/19/2019   • Primary hypertension 03/19/2019   • GERD (gastroesophageal reflux disease) 03/19/2019     He  has a past surgical history that includes Back surgery; Shoulder surgery; IR stroke alert (3/19/2019); and IR cerebral angiography (5/4/2023)  His family history includes Diabetes in his mother; Heart attack in his mother; Prostate cancer in his father  He  reports that he has quit smoking  He has never used smokeless tobacco  He reports that he does not currently use alcohol  He reports that he does not use drugs    Current Outpatient Medications   Medication Sig Dispense Refill   • acetaminophen (TYLENOL) 325 mg tablet Take 3 tablets (975 mg total) by mouth 3 (three) times a day as needed for mild pain, headaches or fever  0   • aspirin (ECOTRIN LOW STRENGTH) 81 mg EC tablet Take 1 tablet (81 mg total) by mouth daily Do not start before April 19, 2023  30 tablet 0   • atorvastatin (LIPITOR) 40 mg tablet Take 40 mg by mouth daily with breakfast     • baclofen 5 MG TABS Take 5 mg by mouth 2 (two) times a day as needed for muscle spasms  0   • cilostazol (PLETAL) 100 mg tablet Take 1 tablet (100 mg total) by mouth 2 (two) times a day before meals Do not start before June 6, 2023   0   • citalopram (CeleXA) 20 mg tablet Take 20 mg by mouth daily     • donepezil (ARICEPT) 10 mg tablet Take 1 tablet (10 mg total) by mouth in the morning 60 tablet 2   • Heparin Sodium, Porcine, (heparin, porcine,) 5,000 units/mL Inject 1 mL (5,000 Units total) under the skin every 8 (eight) hours Stop at discretion of nursing facility provider but likely within a few weeks or sooner Do not start before June 6, 2023  1 mL 0   • lidocaine (Lidoderm) 5 % Apply 1 patch topically over 12 hours daily Remove & Discard patch within 12 hours or as directed by MD 30 patch 2   • losartan (COZAAR) 50 mg tablet Take 50 mg by mouth daily     • metoprolol succinate (TOPROL-XL) 100 mg 24 hr tablet Take 100 mg by mouth daily     • omeprazole (PriLOSEC OTC) 20 MG tablet Take 20 mg by mouth daily     • polyethylene glycol (MIRALAX) 17 g packet Take 17 g by mouth every morning Do not start before May 13, 2023   0   • senna-docusate sodium (SENOKOT S) 8 6-50 mg per tablet Take 2 tablets by mouth 2 (two) times a day  0   • tamsulosin (FLOMAX) 0 4 mg Take 1 capsule (0 4 mg total) by mouth daily with dinner  0   • ticagrelor (BRILINTA) 90 MG Take 1 tablet (90 mg total) by mouth every 12 (twelve) hours  0   • traZODone (DESYREL) 50 mg tablet Take 1 tablet (50 mg total) by mouth daily at bedtime Do not start before June 6, 2023   0     No current facility-administered medications for this visit  Current Outpatient Medications on File Prior to Visit   Medication Sig   • acetaminophen (TYLENOL) 325 mg tablet Take 3 tablets (975 mg total) by mouth 3 (three) times a day as needed for mild pain, headaches or fever   • aspirin (ECOTRIN LOW STRENGTH) 81 mg EC tablet Take 1 tablet (81 mg total) by mouth daily Do not start before April 19, 2023  • atorvastatin (LIPITOR) 40 mg tablet Take 40 mg by mouth daily with breakfast   • baclofen 5 MG TABS Take 5 mg by mouth 2 (two) times a day as needed for muscle spasms   • cilostazol (PLETAL) 100 mg tablet Take 1 tablet (100 mg total) by mouth 2 (two) times a day before meals Do not start before June 6, 2023  • citalopram (CeleXA) 20 mg tablet Take 20 mg by mouth daily   • Heparin Sodium, Porcine, (heparin, porcine,) 5,000 units/mL Inject 1 mL (5,000 Units total) under the skin every 8 (eight) hours Stop at discretion of nursing facility provider but likely within a few weeks or sooner Do not start before June 6, 2023  • lidocaine (Lidoderm) 5 % Apply 1 patch topically over 12 hours daily Remove & Discard patch within 12 hours or as directed by MD   • losartan (COZAAR) 50 mg tablet Take 50 mg by mouth daily   • metoprolol succinate (TOPROL-XL) 100 mg 24 hr tablet Take 100 mg by mouth daily   • omeprazole (PriLOSEC OTC) 20 MG tablet Take 20 mg by mouth daily   • polyethylene glycol (MIRALAX) 17 g packet Take 17 g by mouth every morning Do not start before May 13, 2023     • senna-docusate sodium (SENOKOT S) 8 6-50 mg per tablet Take 2 tablets by mouth 2 (two) times a day   • tamsulosin (FLOMAX) 0 4 mg Take 1 capsule (0 4 mg total) by mouth daily with dinner   • ticagrelor (BRILINTA) 90 MG Take 1 tablet (90 mg total) by mouth every 12 (twelve) hours   • traZODone (DESYREL) 50 mg tablet Take 1 tablet (50 mg total) by mouth daily at bedtime Do not "start before June 6, 2023  No current facility-administered medications on file prior to visit  He is allergic to flexeril [cyclobenzaprine], lisinopril, and nsaids       Objective:    Blood pressure 110/70, pulse 70, height 5' 8\" (1 727 m)  Physical Exam  General, alert awake follows minimal commands  -Weakness on the right upper extremity right lower extremity noted  Sensation is intact throughout  Gait ambulates in a wheelchair    ROS:  Reviewed ROS   Review of Systems   Constitutional: Negative  Negative for appetite change and fever  HENT: Negative  Negative for hearing loss, tinnitus, trouble swallowing and voice change  Eyes: Positive for visual disturbance  Negative for photophobia and pain  Respiratory: Negative  Negative for shortness of breath  Cardiovascular: Negative  Negative for palpitations  Gastrointestinal: Negative  Negative for nausea and vomiting  Endocrine: Negative  Negative for cold intolerance  Genitourinary: Negative  Negative for dysuria, frequency and urgency  Musculoskeletal: Positive for gait problem  Negative for myalgias and neck pain  Skin: Negative  Negative for rash  Allergic/Immunologic: Negative  Neurological: Positive for weakness and headaches  Negative for dizziness, tremors, seizures, syncope, facial asymmetry, speech difficulty, light-headedness and numbness  Patient stated that he has right side weakness  Hematological: Negative  Does not bruise/bleed easily  Psychiatric/Behavioral: Positive for confusion and sleep disturbance  Negative for hallucinations                   "

## 2023-06-16 ENCOUNTER — TELEPHONE (OUTPATIENT)
Dept: NEUROLOGY | Facility: CLINIC | Age: 63
End: 2023-06-16

## 2023-06-16 DIAGNOSIS — K21.9 GERD (GASTROESOPHAGEAL REFLUX DISEASE): Primary | ICD-10-CM

## 2023-06-16 NOTE — PHYSICAL THERAPY NOTE
PT [facility] DISCHARGE SUMMARY    Austin Howard presented to [facility] following hospitalization for CVA resulting in significant functional decline  Physical therapy focused on bed mobility, transfer training, gait training, midline reorientation training, sitting EOB and standing balance, and NPP  Karrie Hdez made fair progress in physical therapy  On discharge, he required Min/ModA with bed mobility, Min/ModA transfers, Total A x2 people for ambulation, and Assist of 2 for safe car transfer  He was discharged to a subacute rehabilitation facility to continue to progress towards goals so he can return home at [facility] independent level

## 2023-06-16 NOTE — TELEPHONE ENCOUNTER
Brent Lopes called and would like a call back because the doctor at the nursing home is requesting a medication change and would like to get a okay from neurology to do it      CB# [phone]  Ask to be transferred to the A[facility] then ask for the RN Brent Lopes

## 2023-06-19 NOTE — TELEPHONE ENCOUNTER
Patient of Dr Mere Connolly, last visit 6/13    Spoke to Shira at [facility] [place] [address] [place] [address] is requesting dose adjustment of pletal 100 mg bid as patient is on omeprazole 20 mg daily  Recommending reduce  pletal to  50 mg bid as interaction is a potential increase in plasma concentrations of pletal     Medication interaction  documents major interaction between omeprazole/cilostazol as may significantly increase blood levels of cilostazol  If dose is adjusted, requested documentation on letterhead of change be faxed to [phone] (script does not need to be sent to pharmacy  Please provide recommendation  Thanks for your help

## 2023-06-20 RX ORDER — PANTOPRAZOLE SODIUM 40 MG/1
40 TABLET, DELAYED RELEASE ORAL DAILY
Qty: 90 TABLET | Refills: 2 | Status: SHIPPED | OUTPATIENT
Start: 2023-06-20

## 2023-06-20 RX ORDER — PANTOPRAZOLE SODIUM 40 MG/1
40 TABLET, DELAYED RELEASE ORAL DAILY
Qty: 90 TABLET | Refills: 2 | Status: SHIPPED | OUTPATIENT
Start: 2023-06-20 | End: 2023-06-20

## 2023-06-20 NOTE — TELEPHONE ENCOUNTER
I spoke to [facility]; requesting script be entered and set to print for the protonix (pantoprazole)  The script can be faxed to them or they will accept order on letterhead (fax # [phone])    Please enter script if in agreement replacing omeprazole 20 mg with protonix (pantoprazole)  Thanks for your help  Please have [place] fax script once printed if possible

## 2023-06-20 NOTE — TELEPHONE ENCOUNTER
So we should switch patient to protonix instead rather than decreasing to pletal 50mg PO BID  If patient agreeable, ill send a new rx to protonix instead   Thanks

## 2023-06-22 ENCOUNTER — TELEPHONE (OUTPATIENT)
Dept: NEUROLOGY | Facility: CLINIC | Age: 63
End: 2023-06-22

## 2023-06-22 NOTE — TELEPHONE ENCOUNTER
Post CVA Discharge Follow Up  Hospitalization: 4/26/23-5/12/23, 5/12/23-6/6/23 [facility]    According to chart, patient discharged to [facility] facility, reached the patient's nurse  She reports the patient is doing well  Denies any new or worsening stroke-like symptoms  Reports he is moving more often and how the provider at the nursing facility discontinued the SQ heparin  She reports how he continues to have issues with his memory  Ambulation / ADLs:  Patient mobilizing via walker  He continues to require assistance with ADLs  Patient maintained on a regular consistency diet  He remains continent at this time  Medication Review:  Reviewed medications with them  No reported missed doses, medication side effects, or signs of bleeding  Last reported /52  They will continue to closely monitor  Appointments:  Patient was recently seen by Dr Kirsty Gonzalez on 6/13/23  There is no estimated discharge date at this time

## 2023-06-27 ENCOUNTER — TELEPHONE (OUTPATIENT)
Dept: CARDIOLOGY CLINIC | Facility: CLINIC | Age: 63
End: 2023-06-27

## 2023-06-27 NOTE — TELEPHONE ENCOUNTER
Pt's wife called regarding pt's loop  They were under the impression that he needed to be seen for an office visit to discuss a loop recorder explant and new implant since his loop battery is now dead  Thre are no visits available with Dr Thomas Nicolas and still pretty far out for a PA  Does he need to be seen in office for his or can this just be scheduled?

## 2023-06-28 ENCOUNTER — OFFICE VISIT (OUTPATIENT)
Dept: NEUROSURGERY | Facility: CLINIC | Age: 63
End: 2023-06-28

## 2023-06-28 VITALS — HEIGHT: 68 IN | BODY MASS INDEX: 31.95 KG/M2

## 2023-06-28 DIAGNOSIS — G91.2 NPH (NORMAL PRESSURE HYDROCEPHALUS) (HCC): ICD-10-CM

## 2023-06-28 DIAGNOSIS — I65.23 BILATERAL CAROTID ARTERY STENOSIS: Primary | ICD-10-CM

## 2023-06-28 DIAGNOSIS — I63.9 RECURRENT STROKES (HCC): ICD-10-CM

## 2023-06-28 PROCEDURE — 99024 POSTOP FOLLOW-UP VISIT: CPT | Performed by: NEUROLOGICAL SURGERY

## 2023-06-28 RX ORDER — ALBUTEROL SULFATE 90 UG/1
AEROSOL, METERED RESPIRATORY (INHALATION)
Status: ON HOLD | COMMUNITY
Start: 2023-05-08

## 2023-06-28 RX ORDER — PANTOPRAZOLE SODIUM 20 MG/1
TABLET, DELAYED RELEASE ORAL
Status: ON HOLD | COMMUNITY
Start: 2023-06-26

## 2023-06-28 NOTE — PROGRESS NOTES
Patient Id: Shani Lora is a 58 y o  male        Handedness: Right      Assessment/Plan:    Diagnoses and all orders for this visit:    Bilateral carotid artery stenosis  -     Ambulatory Referral to Neurosurgery  -     CTA head and neck w wo contrast; Future    Recurrent strokes ([facility] )  -     Ambulatory Referral to Neurosurgery  -     CTA head and neck w wo contrast; Future    Possible NPH (normal pressure hydrocephalus) ([facility] )  -     Ambulatory Referral to Neurosurgery    Other orders  -     albuterol (PROVENTIL HFA,VENTOLIN HFA) 90 mcg/act inhaler  -     pantoprazole (PROTONIX) 20 mg tablet        Discussion and summary:  1  Symptomatic left carotid stenosis status post angioplasty and stenting 5/4/2023  Recently discharged from rehab  He is made significant clinical improvement but still has some expressive aphasia as well as a field cut  His modified Aurea score is a 4 and he has significant dependency for ambulation and self-care  However his trajectory is towards improvement  I am hopeful that he continues to improve in this regard  His most recent Doppler shows a patent stent  CTA in 6 months to determine plan for his contralateral/asymptomatic stenosis as well as evaluate stent patency  2   Asymptomatic right carotid stenosis measuring 60 to 70%  Typically I would advocate for treatment based on his angiographic and CT findings  However given his poor quality of life and high modified Aurea score at this time I would like to see him back in 6 months to see if he continues to improve prior to treating his asymptomatic side  Given his significant right cervical carotid stenosis I will keep him on dual antiplatelet therapy as opposed to discontinuing at 6 weeks necessary his last stent  3   Prior history of right MCA thrombectomy with evidence of intracranial stenosis   We will obtain CTA in 6 months to better evaluate this for follow-up  Still on dual antiplatelet therapy      I spent 45 minutes in the care of this patient including the review and interpretation of imaging and tests results, as well as communication/explanation to the patient  regarding the natural history of this process and test/imaging results, care coordination and documentation  Chief Complaint: Follow-up [facility] f/u from )        HPI:   This is an unfortunate 48-year-old gentleman who initially presented in 2019 requiring a right MCA thrombectomy and had significant improvement  Unfortunately he represented approximately 2 months ago with a new left MCA stroke and significant bilateral carotid stenosis  He underwent a left carotid angioplasty and stenting which was without complication  He was ultimately discharged from the hospital to rehab and now recently returned home  Unfortunately he still continues to have significant speech deficits as well as a right field cut  He is unable to ambulate independently but now can use a walker with assistive aid  He is unable to care for himself in terms of showering and other activities of independent living  His modified [place] score is a 4  He has noticed significant improvement especially in his speech however it is slow  He is scheduled to have in-house therapies in the future  He is allergic to lisinopril  He is   He has 1 son that is 36 and 1 daughter that is 44  He is currently disabled  He has not smoked since onset of hospitalization  He does not drink alcohol or use illicit drugs  He has 3 maternal uncles that had strokes  His father also suffered a stroke  His father was adopted so extended family history is unknown  Review of systems obtained by the MA reviewed and updated below  Review of Systems   Constitutional: Negative  HENT: Negative for tinnitus  Eyes: Positive for visual disturbance (occ blurry vision)  Respiratory: Negative  Cardiovascular: Negative  Gastrointestinal: Negative  Endocrine: Negative  "  Genitourinary: Negative  Musculoskeletal: Positive for back pain (hx of multiple back surgeries) and gait problem (unable to stand/walk)  Negative for neck pain  Neurological: Positive for dizziness (occ), speech difficulty (trouble finding words), weakness (generalized), light-headedness (occ), numbness (occ b/l arms and legs) and headaches (constant- changes in severity)  Negative for tremors and seizures  Hematological: Bruises/bleeds easily (medication)  Psychiatric/Behavioral: Positive for confusion (occ pt reports a \"foggy\" feeling) and decreased concentration  The patient is nervous/anxious (and depression)  Physical Exam  There were no vitals filed for this visit  He is awake alert and oriented  His speech is slow with short sentences  He is able to name objects specifically a pen, its color, and the  He is unable to add a quarter dime and nickel  When doing this he gets frustrated and begins to have even more expressive difficulties  His pupils are equal round reactive to light  He has a right superior quadrant field cut  His face is symmetric his tongue is midline  He has no drift or dysmetria he has full strength in his bilateral upper and lower extremities, though coordination appears to be diminished on the right      The following portions of the patient's history were reviewed and updated as appropriate: allergies, current medications, past family history, past medical history, past social history, past surgical history and problem list     Active Ambulatory Problems     Diagnosis Date Noted   • History of stroke 03/19/2019   • Headache 03/19/2019   • Primary hypertension 03/19/2019   • GERD (gastroesophageal reflux disease) 03/19/2019   • At risk for venous thromboembolism (VTE) 03/26/2019   • Hypertriglyceridemia 03/26/2019   • Nicotine dependence 03/26/2019   • Bilateral carotid artery stenosis 03/26/2019   • Presbyopia 03/26/2019   • Urinary retention 03/27/2019   • Fall " 03/28/2019   • 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 ([facility] ) 03/28/2019   • Anxiety and depression 03/29/2019   • Insomnia 03/29/2019   • Type 2 diabetes mellitus ([place] [id number] ) 04/03/2019   • Status post placement of implantable loop recorder 04/06/2019   • Chronic ischemic right MCA stroke 08/06/2019   • Snoring 08/10/2020   • Memory deficits 08/10/2020   • Hypertensive encephalopathy, transient 01/12/2023   • Chronic low back pain 04/16/2023   • Middle cerebral artery stenosis, right 04/17/2023   • Left posterior MCA stroke - etiology unclear at this time 04/17/2023   • Chronic anticoagulation 04/26/2023   • Stroke ([place] [id number] ) 04/26/2023   • Hyponatremia 04/26/2023   • Prediabetes 04/27/2023   • SIRS (systemic inflammatory response syndrome) ([place] [id number] ) 04/27/2023   • Tachycardia 05/05/2023   • Infrarenal abdominal aortic aneurysm (AAA) without rupture ([place] [id number] ) 05/08/2023   • History of tobacco use 05/12/2023   • Chronic ischemic left MCA stroke 05/12/2023   • Hypokalemia 05/12/2023   • Abnormal laboratory test 05/15/2023   • Multiple thyroid nodules 06/01/2023   • Possible NPH (normal pressure hydrocephalus) ([place] [address] ) 06/02/2023   • Muscle spasms of both lower extremities 06/02/2023     Resolved Ambulatory Problems     Diagnosis Date Noted   • Hyperglycemia 03/19/2019   • Dyslipidemia 03/26/2019   • Arthropathy of left shoulder 03/28/2019   • Vascular dementia ([place] [id number] ) 03/02/2021   • Leukocytosis 05/12/2023     Past Medical History:   Diagnosis Date   • Depression    • Diabetes mellitus ([place] [id number] )    • Hyperlipidemia    • Hypertension        Past Surgical History:   Procedure Laterality Date   • BACK SURGERY     • IR CEREBRAL ANGIOGRAPHY  5/4/2023   • IR STROKE ALERT  3/19/2019   • SHOULDER SURGERY           Current Outpatient Medications:   •  acetaminophen (TYLENOL) 325 mg tablet, Take 3 tablets (975 mg total) by mouth 3 (three) times a day as needed for mild pain, headaches or fever, Disp: , Rfl: 0  •  aspirin (ECOTRIN LOW STRENGTH) 81 mg EC tablet, Take 1 tablet (81 mg total) by mouth daily Do not start before April 19, 2023 , Disp: 30 tablet, Rfl: 0  •  atorvastatin (LIPITOR) 40 mg tablet, Take 40 mg by mouth daily with breakfast, Disp: , Rfl:   •  baclofen 5 MG TABS, Take 5 mg by mouth 2 (two) times a day as needed for muscle spasms, Disp: , Rfl: 0  •  cilostazol (PLETAL) 100 mg tablet, Take 1 tablet (100 mg total) by mouth 2 (two) times a day before meals Do not start before June 6, 2023 , Disp: , Rfl: 0  •  citalopram (CeleXA) 20 mg tablet, Take 20 mg by mouth daily, Disp: , Rfl:   •  donepezil (ARICEPT) 10 mg tablet, Take 1 tablet (10 mg total) by mouth in the morning, Disp: 60 tablet, Rfl: 2  •  lidocaine (Lidoderm) 5 %, Apply 1 patch topically over 12 hours daily Remove & Discard patch within 12 hours or as directed by MD, Disp: 30 patch, Rfl: 2  •  losartan (COZAAR) 50 mg tablet, Take 50 mg by mouth daily, Disp: , Rfl:   •  metoprolol succinate (TOPROL-XL) 100 mg 24 hr tablet, Take 100 mg by mouth daily, Disp: , Rfl:   •  pantoprazole (PROTONIX) 40 mg tablet, Take 1 tablet (40 mg total) by mouth daily, Disp: 90 tablet, Rfl: 2  •  polyethylene glycol (MIRALAX) 17 g packet, Take 17 g by mouth every morning Do not start before May 13, 2023 , Disp: , Rfl: 0  •  senna-docusate sodium (SENOKOT S) 8 6-50 mg per tablet, Take 2 tablets by mouth 2 (two) times a day, Disp: , Rfl: 0  •  tamsulosin (FLOMAX) 0 4 mg, Take 1 capsule (0 4 mg total) by mouth daily with dinner, Disp: , Rfl: 0  •  ticagrelor (BRILINTA) 90 MG, Take 1 tablet (90 mg total) by mouth every 12 (twelve) hours, Disp: , Rfl: 0  •  traZODone (DESYREL) 50 mg tablet, Take 1 tablet (50 mg total) by mouth daily at bedtime Do not start before June 6, 2023 , Disp: , Rfl: 0  •  albuterol (PROVENTIL HFA,VENTOLIN HFA) 90 mcg/act inhaler, , Disp: , Rfl:   •  Heparin Sodium, Porcine, (heparin, porcine,) 5,000 units/mL, Inject 1 mL (5,000 Units total) under the skin every 8 (eight) hours Stop at discretion of nursing facility provider but likely within a few weeks or sooner Do not start before June 6, 2023  (Patient not taking: Reported on 6/28/2023), Disp: 1 mL, Rfl: 0  •  pantoprazole (PROTONIX) 20 mg tablet, , Disp: , Rfl:     Results/Data: Imaging reviewed in detail with patient as well as reports

## 2023-07-01 ENCOUNTER — APPOINTMENT (EMERGENCY)
Dept: CT IMAGING | Facility: HOSPITAL | Age: 63
DRG: 057 | End: 2023-07-01
Payer: COMMERCIAL

## 2023-07-01 ENCOUNTER — HOSPITAL ENCOUNTER (INPATIENT)
Facility: HOSPITAL | Age: 63
LOS: 9 days | Discharge: NURSING, REHAB OR LONG-TERM CARE | DRG: 057 | End: 2023-07-12
Attending: EMERGENCY MEDICINE | Admitting: HOSPITALIST
Payer: COMMERCIAL

## 2023-07-01 DIAGNOSIS — R47.01 APHASIA: ICD-10-CM

## 2023-07-01 DIAGNOSIS — I63.9 CEREBROVASCULAR ACCIDENT (CVA), UNSPECIFIED MECHANISM (HCC): ICD-10-CM

## 2023-07-01 DIAGNOSIS — M54.50 CHRONIC LOW BACK PAIN, UNSPECIFIED BACK PAIN LATERALITY, UNSPECIFIED WHETHER SCIATICA PRESENT: ICD-10-CM

## 2023-07-01 DIAGNOSIS — I69.30 CHRONIC ISCHEMIC LEFT MCA STROKE: ICD-10-CM

## 2023-07-01 DIAGNOSIS — H53.8 BLURRED VISION: ICD-10-CM

## 2023-07-01 DIAGNOSIS — R89.9 ABNORMAL LABORATORY TEST: ICD-10-CM

## 2023-07-01 DIAGNOSIS — G89.29 CHRONIC LOW BACK PAIN, UNSPECIFIED BACK PAIN LATERALITY, UNSPECIFIED WHETHER SCIATICA PRESENT: ICD-10-CM

## 2023-07-01 DIAGNOSIS — G91.2 NPH (NORMAL PRESSURE HYDROCEPHALUS) (HCC): ICD-10-CM

## 2023-07-01 DIAGNOSIS — R51.9 HEADACHE: Primary | ICD-10-CM

## 2023-07-01 PROBLEM — I70.213 ATHEROSCLEROSIS OF NATIVE ARTERIES OF EXTREMITIES WITH INTERMITTENT CLAUDICATION, BILATERAL LEGS (HCC): Status: ACTIVE | Noted: 2023-06-06

## 2023-07-01 PROBLEM — N40.1 BENIGN PROSTATIC HYPERPLASIA WITH LOWER URINARY TRACT SYMPTOMS: Status: ACTIVE | Noted: 2023-06-06

## 2023-07-01 LAB
ANION GAP SERPL CALCULATED.3IONS-SCNC: 7 MMOL/L
BASOPHILS # BLD AUTO: 0.08 THOUSANDS/ÂΜL (ref 0–0.1)
BASOPHILS NFR BLD AUTO: 1 % (ref 0–1)
BUN SERPL-MCNC: 16 MG/DL (ref 5–25)
CALCIUM SERPL-MCNC: 9.5 MG/DL (ref 8.4–10.2)
CHLORIDE SERPL-SCNC: 108 MMOL/L (ref 96–108)
CO2 SERPL-SCNC: 27 MMOL/L (ref 21–32)
CREAT SERPL-MCNC: 1.17 MG/DL (ref 0.6–1.3)
EOSINOPHIL # BLD AUTO: 0.58 THOUSAND/ÂΜL (ref 0–0.61)
EOSINOPHIL NFR BLD AUTO: 5 % (ref 0–6)
ERYTHROCYTE [DISTWIDTH] IN BLOOD BY AUTOMATED COUNT: 13.8 % (ref 11.6–15.1)
GFR SERPL CREATININE-BSD FRML MDRD: 65 ML/MIN/1.73SQ M
GLUCOSE SERPL-MCNC: 79 MG/DL (ref 65–140)
GLUCOSE SERPL-MCNC: 95 MG/DL (ref 65–140)
HCT VFR BLD AUTO: 37.5 % (ref 36.5–49.3)
HGB BLD-MCNC: 12.6 G/DL (ref 12–17)
IMM GRANULOCYTES # BLD AUTO: 0.07 THOUSAND/UL (ref 0–0.2)
IMM GRANULOCYTES NFR BLD AUTO: 1 % (ref 0–2)
LYMPHOCYTES # BLD AUTO: 2.76 THOUSANDS/ÂΜL (ref 0.6–4.47)
LYMPHOCYTES NFR BLD AUTO: 25 % (ref 14–44)
MCH RBC QN AUTO: 32.7 PG (ref 26.8–34.3)
MCHC RBC AUTO-ENTMCNC: 33.6 G/DL (ref 31.4–37.4)
MCV RBC AUTO: 97 FL (ref 82–98)
MONOCYTES # BLD AUTO: 1 THOUSAND/ÂΜL (ref 0.17–1.22)
MONOCYTES NFR BLD AUTO: 9 % (ref 4–12)
NEUTROPHILS # BLD AUTO: 6.39 THOUSANDS/ÂΜL (ref 1.85–7.62)
NEUTS SEG NFR BLD AUTO: 59 % (ref 43–75)
NRBC BLD AUTO-RTO: 0 /100 WBCS
PLATELET # BLD AUTO: 272 THOUSANDS/UL (ref 149–390)
PMV BLD AUTO: 10 FL (ref 8.9–12.7)
POTASSIUM SERPL-SCNC: 4 MMOL/L (ref 3.5–5.3)
RBC # BLD AUTO: 3.85 MILLION/UL (ref 3.88–5.62)
SODIUM SERPL-SCNC: 142 MMOL/L (ref 135–147)
WBC # BLD AUTO: 10.88 THOUSAND/UL (ref 4.31–10.16)

## 2023-07-01 PROCEDURE — 36415 COLL VENOUS BLD VENIPUNCTURE: CPT | Performed by: EMERGENCY MEDICINE

## 2023-07-01 PROCEDURE — 70450 CT HEAD/BRAIN W/O DYE: CPT

## 2023-07-01 PROCEDURE — 85025 COMPLETE CBC W/AUTO DIFF WBC: CPT | Performed by: EMERGENCY MEDICINE

## 2023-07-01 PROCEDURE — 82948 REAGENT STRIP/BLOOD GLUCOSE: CPT

## 2023-07-01 PROCEDURE — 99285 EMERGENCY DEPT VISIT HI MDM: CPT | Performed by: EMERGENCY MEDICINE

## 2023-07-01 PROCEDURE — 99223 1ST HOSP IP/OBS HIGH 75: CPT | Performed by: INTERNAL MEDICINE

## 2023-07-01 PROCEDURE — 80048 BASIC METABOLIC PNL TOTAL CA: CPT | Performed by: EMERGENCY MEDICINE

## 2023-07-01 PROCEDURE — 99285 EMERGENCY DEPT VISIT HI MDM: CPT

## 2023-07-01 PROCEDURE — 96360 HYDRATION IV INFUSION INIT: CPT

## 2023-07-01 RX ORDER — HEPARIN SODIUM 5000 [USP'U]/ML
5000 INJECTION, SOLUTION INTRAVENOUS; SUBCUTANEOUS EVERY 8 HOURS SCHEDULED
Status: DISCONTINUED | OUTPATIENT
Start: 2023-07-01 | End: 2023-07-08

## 2023-07-01 RX ORDER — LOSARTAN POTASSIUM 50 MG/1
50 TABLET ORAL DAILY
Status: DISCONTINUED | OUTPATIENT
Start: 2023-07-02 | End: 2023-07-01

## 2023-07-01 RX ORDER — ACETAMINOPHEN 325 MG/1
975 TABLET ORAL 3 TIMES DAILY PRN
Status: DISCONTINUED | OUTPATIENT
Start: 2023-07-01 | End: 2023-07-12 | Stop reason: HOSPADM

## 2023-07-01 RX ORDER — ATORVASTATIN CALCIUM 40 MG/1
40 TABLET, FILM COATED ORAL
Status: DISCONTINUED | OUTPATIENT
Start: 2023-07-02 | End: 2023-07-12 | Stop reason: HOSPADM

## 2023-07-01 RX ORDER — LIDOCAINE 50 MG/G
1 PATCH TOPICAL DAILY
Status: DISCONTINUED | OUTPATIENT
Start: 2023-07-02 | End: 2023-07-01

## 2023-07-01 RX ORDER — POLYETHYLENE GLYCOL 3350 17 G/17G
17 POWDER, FOR SOLUTION ORAL EVERY MORNING
Status: DISCONTINUED | OUTPATIENT
Start: 2023-07-02 | End: 2023-07-12 | Stop reason: HOSPADM

## 2023-07-01 RX ORDER — AMOXICILLIN 250 MG
2 CAPSULE ORAL 2 TIMES DAILY
Status: DISCONTINUED | OUTPATIENT
Start: 2023-07-01 | End: 2023-07-12 | Stop reason: HOSPADM

## 2023-07-01 RX ORDER — TRAZODONE HYDROCHLORIDE 50 MG/1
50 TABLET ORAL
Status: DISCONTINUED | OUTPATIENT
Start: 2023-07-01 | End: 2023-07-12 | Stop reason: HOSPADM

## 2023-07-01 RX ORDER — DONEPEZIL HYDROCHLORIDE 5 MG/1
10 TABLET, FILM COATED ORAL DAILY
Status: DISCONTINUED | OUTPATIENT
Start: 2023-07-01 | End: 2023-07-01

## 2023-07-01 RX ORDER — PANTOPRAZOLE SODIUM 40 MG/1
40 TABLET, DELAYED RELEASE ORAL DAILY
Status: DISCONTINUED | OUTPATIENT
Start: 2023-07-02 | End: 2023-07-12 | Stop reason: HOSPADM

## 2023-07-01 RX ORDER — METOPROLOL SUCCINATE 50 MG/1
100 TABLET, EXTENDED RELEASE ORAL DAILY
Status: DISCONTINUED | OUTPATIENT
Start: 2023-07-02 | End: 2023-07-12 | Stop reason: HOSPADM

## 2023-07-01 RX ORDER — BACLOFEN 10 MG/1
5 TABLET ORAL 2 TIMES DAILY PRN
Status: DISCONTINUED | OUTPATIENT
Start: 2023-07-01 | End: 2023-07-01

## 2023-07-01 RX ORDER — DONEPEZIL HYDROCHLORIDE 5 MG/1
10 TABLET, FILM COATED ORAL
Status: DISCONTINUED | OUTPATIENT
Start: 2023-07-01 | End: 2023-07-12 | Stop reason: HOSPADM

## 2023-07-01 RX ORDER — CITALOPRAM 20 MG/1
20 TABLET ORAL DAILY
Status: DISCONTINUED | OUTPATIENT
Start: 2023-07-02 | End: 2023-07-01

## 2023-07-01 RX ORDER — TAMSULOSIN HYDROCHLORIDE 0.4 MG/1
0.4 CAPSULE ORAL
Status: DISCONTINUED | OUTPATIENT
Start: 2023-07-01 | End: 2023-07-12 | Stop reason: HOSPADM

## 2023-07-01 RX ORDER — CILOSTAZOL 50 MG/1
100 TABLET ORAL
Status: DISCONTINUED | OUTPATIENT
Start: 2023-07-01 | End: 2023-07-12 | Stop reason: HOSPADM

## 2023-07-01 RX ORDER — LIDOCAINE 50 MG/G
1 PATCH TOPICAL DAILY
Status: DISCONTINUED | OUTPATIENT
Start: 2023-07-01 | End: 2023-07-12 | Stop reason: HOSPADM

## 2023-07-01 RX ORDER — ALBUTEROL SULFATE 90 UG/1
1 AEROSOL, METERED RESPIRATORY (INHALATION) EVERY 4 HOURS PRN
Status: DISCONTINUED | OUTPATIENT
Start: 2023-07-01 | End: 2023-07-12 | Stop reason: HOSPADM

## 2023-07-01 RX ORDER — BACLOFEN 10 MG/1
5 TABLET ORAL EVERY 12 HOURS
Status: DISCONTINUED | OUTPATIENT
Start: 2023-07-01 | End: 2023-07-12 | Stop reason: HOSPADM

## 2023-07-01 RX ADMIN — SODIUM CHLORIDE 1000 ML: 0.9 INJECTION, SOLUTION INTRAVENOUS at 14:11

## 2023-07-01 RX ADMIN — TICAGRELOR 90 MG: 90 TABLET ORAL at 21:08

## 2023-07-01 RX ADMIN — DONEPEZIL HYDROCHLORIDE 10 MG: 5 TABLET ORAL at 21:08

## 2023-07-01 RX ADMIN — LIDOCAINE 5% 1 PATCH: 700 PATCH TOPICAL at 21:09

## 2023-07-01 RX ADMIN — CILOSTAZOL 100 MG: 50 TABLET ORAL at 17:34

## 2023-07-01 RX ADMIN — HEPARIN SODIUM 5000 UNITS: 5000 INJECTION INTRAVENOUS; SUBCUTANEOUS at 21:08

## 2023-07-01 RX ADMIN — TRAZODONE HYDROCHLORIDE 50 MG: 50 TABLET ORAL at 21:08

## 2023-07-01 RX ADMIN — BACLOFEN 5 MG: 10 TABLET ORAL at 21:08

## 2023-07-01 RX ADMIN — TAMSULOSIN HYDROCHLORIDE 0.4 MG: 0.4 CAPSULE ORAL at 17:29

## 2023-07-01 RX ADMIN — DONEPEZIL HYDROCHLORIDE 10 MG: 5 TABLET, FILM COATED ORAL at 17:29

## 2023-07-01 NOTE — ASSESSMENT & PLAN NOTE
Lab Results   Component Value Date    HGBA1C 5.8 (H) 04/27/2023       Recent Labs     07/01/23  1627   POCGLU 79       Blood Sugar Average: Last 72 hrs:  (P) 79     A1c 5.8  No concerns at this time  Not on any agent

## 2023-07-01 NOTE — QUICK NOTE
Called to bedside by RN. Wife stating pt had vision changes that was not reported earlier. Vision changes described as blurred vision that onset around 1200 after awakening from a nap. Reportedly complained of a headache at 0930 and family gave tylenol x2 and pt took a nap. Upon awakening at 1200, pt complained of not "being able to see" and was shaking his head due to his head feeling "foggy." On exam, pt does have visual field deficits in left inferomedial field and right inferolateral field. Neuro exam with some intermittent expressive aphasia (baseline and unchanged per patient) and ataxia b/l with finger to nose though appears to be more impulsive movements than true ataxia (again baseline per patient). Family states pt does have vision deficits from prior stroke but unsure exactly what. [address] in ED just showing "Evidence of maturing left occipital parietal infarct. There is increased encephalomalacia."  Due to reports of vision changes, will place on acute stroke pathway. Losartan held to allow for permissive HTN. Continue metoprolol for rate control. Wife updated.

## 2023-07-01 NOTE — PLAN OF CARE

## 2023-07-01 NOTE — ASSESSMENT & PLAN NOTE
History of multiple stroke  Hospitalized May for multifocal ischemic infarcts from distributions with bilateral carotid stenosis  -Underwent left ICA PTA with stenting on May 4  -Originally it was on Eliquis and aspirin and after recent stroke he was transitioned to aspirin and Brilinta  -Also on Pletal for previous diagnosis of peripheral vascular disease  -During recent hospitalization Eliquis was discontinued without plan to be resumed as neurosurgery who evaluated also the patient together with the neurology felt that strokes are less likely embolic in nature  -Vasculitis work-up was negative  -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 for which outpatient follow-up with neurosurgery was suggested  -He continues on Lipitor 40 mg/day  -He has aphasia and memory loss with ambulatory dysfunction and reported hemiplegia on the nondominant side as effect of the stroke  -Loop recorder replaced in 2019 for previous stroke and reported as negative however, there is a plan to insert the new loop recorder for the patient in the near future with cardiology  -He has completed [facility] rehabilitation and was discharged home in the first week of June  -Since being home as per ER spoke with wife the patient has been unable to perform ADL and has experienced more difficulties with walking  -Today he had a fall and was brought to the emergency department as per advice of visiting nurse who felt that the patient will require higher level of care that wife can no longer provide  -CT head in the emergency department negative for acute finding, evolving left parietal stroke  -He appears to be at baseline with some aphasia/dysarthria some memory impairment  -Wife reports that the patient is impulsive at home, not witnessed in the emergency department however  -Resume all chronic medications  -Strict fall precautions  -Strict aspiration precaution  -PT/OT and  evaluation possible placement at least short-term  -With no neurological symptoms no acute stroke suspected, for any changes we will consult neurology and obtain new images of the brain

## 2023-07-01 NOTE — ASSESSMENT & PLAN NOTE
He has established follow-up with neurosurgery, no need for further intervention while in the hospital  No Need for urgent lumbar puncture

## 2023-07-01 NOTE — H&P
[facility]  H&P  Name: Marcela Hernandez 58 y.o. male I MRN: [id number]  Unit/Bed#: -01 I Date of Admission: 7/1/2023   Date of Service: 7/1/2023 I Hospital Day: 0      Assessment/Plan   Possible NPH (normal pressure hydrocephalus) ([address])  Assessment & Plan  He has established follow-up with neurosurgery, no need for further intervention while in the hospital  No Need for urgent lumbar puncture    Stroke [facility])  Assessment & Plan  History of multiple stroke  Hospitalized May for multifocal ischemic infarcts from distributions with bilateral carotid stenosis  -Underwent left ICA PTA with stenting on May 4  -Originally it was on Eliquis and aspirin and after recent stroke he was transitioned to aspirin and Brilinta  -Also on Pletal for previous diagnosis of peripheral vascular disease  -During recent hospitalization Eliquis was discontinued without plan to be resumed as neurosurgery who evaluated also the patient together with the neurology felt that strokes are less likely embolic in nature  -Vasculitis work-up was negative  -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 for which outpatient follow-up with neurosurgery was suggested  -He continues on Lipitor 40 mg/day  -He has aphasia and memory loss with ambulatory dysfunction and reported hemiplegia on the nondominant side as effect of the stroke  -Loop recorder replaced in 2019 for previous stroke and reported as negative however, there is a plan to insert the new loop recorder for the patient in the near future with cardiology  -He has completed [facility] rehabilitation and was discharged home in the first week of June  -Since being home as per ER spoke with wife the patient has been unable to perform ADL and has experienced more difficulties with walking  -Today he had a fall and was brought to the emergency department as per advice of visiting nurse who felt that the patient will require higher level of care that wife can no longer provide  -CT head in the emergency department negative for acute finding, evolving left parietal stroke  -He appears to be at baseline with some aphasia/dysarthria some memory impairment  -Wife reports that the patient is impulsive at home, not witnessed in the emergency department however  -Resume all chronic medications  -Strict fall precautions  -Strict aspiration precaution  -PT/OT and  evaluation possible placement at least short-term  -With no neurological symptoms no acute stroke suspected, for any changes we will consult neurology and obtain new images of the brain            Memory deficits  Assessment & Plan  Positive care and Namenda, monitor for sundowning, strict fall precautions    Type 2 diabetes mellitus (HCC)  Assessment & Plan  Lab Results   Component Value Date    HGBA1C 5.8 (H) 04/27/2023       Recent Labs     07/01/23  1627   POCGLU 79       Blood Sugar Average: Last 72 hrs:  (P) 79     A1c 5.8  No concerns at this time  Not on any agent    [address]  Please refer to principal         VTE Prophylaxis: Enoxaparin (Lovenox)  / sequential compression device   Code Status: full code  POLST: POLST is not applicable to this patient  Discussion with family: no    Anticipated Length of Stay:  Patient will be admitted on an Observation basis with an anticipated length of stay of  < 2 midnights. Justification for Hospital Stay: PT/OT ---> rehab? Total Time for Visit, including Counseling / Coordination of Care: 45 minutes. Greater than 50% of this total time spent on direct patient counseling and coordination of care. Chief Complaint:   Fall    History of Present Illness:    Keira Bryant is a 58 y.o. male who presents with fall. Is a 59-year-old male with complex medical history of previous multiple stroke recently admitted in May 2023 for a new stroke and then in postacute rehab in June 2023 and released to home came to the hospital today upon concern of visiting nurse.   The patient is that according to the patient multiple falls at home since released from postacute rehab. Visiting nurse found that the patient had fallen again today and sent him to the hospital for evaluation and possible hospitalization as she felt that the patient will require higher level of care but family is no longer able to provide. Emergency department is only positive for evolving stroke of the left parietal lobe and known possible NPH. Review of Systems:    Review of Systems   Constitutional: Negative for chills and fever. HENT: Negative for ear pain and sore throat. Eyes: Negative for pain and visual disturbance. Respiratory: Negative for cough and shortness of breath. Cardiovascular: Negative for chest pain and palpitations. Gastrointestinal: Negative for abdominal pain and vomiting. Genitourinary: Negative for dysuria and hematuria. Musculoskeletal: Negative for arthralgias and back pain. Skin: Negative for color change and rash. Neurological: Positive for speech difficulty. Negative for seizures and syncope. Fall   All other systems reviewed and are negative. Past Medical and Surgical History:     Past Medical History:   Diagnosis Date   • Depression    • Diabetes mellitus ([address])     patient denies   • Dyslipidemia 03/26/2019   • Hyperlipidemia    • Hypertension    • Stroke [facility])        Past Surgical History:   Procedure Laterality Date   • BACK SURGERY     • IR CEREBRAL ANGIOGRAPHY  5/4/2023   • IR STROKE ALERT  3/19/2019   • SHOULDER SURGERY         Meds/Allergies:    Prior to Admission medications    Medication Sig Start Date End Date Taking?  Authorizing Provider   acetaminophen (TYLENOL) 325 mg tablet Take 3 tablets (975 mg total) by mouth 3 (three) times a day as needed for mild pain, headaches or fever 6/5/23  Yes Se Owen MD   aspirin (ECOTRIN LOW STRENGTH) 81 mg EC tablet Take 1 tablet (81 mg total) by mouth daily Do not start before April 19, 2023. 4/19/23  Yes Aminah WHYTE PA-C   atorvastatin (LIPITOR) 40 mg tablet Take 40 mg by mouth daily with breakfast   Yes Historical Provider, MD   baclofen 5 MG TABS Take 5 mg by mouth 2 (two) times a day as needed for muscle spasms 6/5/23  Yes Ana María De La Torre MD   cilostazol (PLETAL) 100 mg tablet Take 1 tablet (100 mg total) by mouth 2 (two) times a day before meals Do not start before June 6, 2023. 6/6/23  Yes Ana María De La Torre MD   citalopram (CeleXA) 20 mg tablet Take 20 mg by mouth daily   Yes Historical Provider, MD   donepezil (ARICEPT) 10 mg tablet Take 1 tablet (10 mg total) by mouth in the morning 6/13/23  Yes Sarahy Dang MD   lidocaine (Lidoderm) 5 % Apply 1 patch topically over 12 hours daily Remove & Discard patch within 12 hours or as directed by MD 4/26/23  Yes Josue Sandy DO   losartan (COZAAR) 50 mg tablet Take 50 mg by mouth daily   Yes Historical Provider, MD   metoprolol succinate (TOPROL-XL) 100 mg 24 hr tablet Take 100 mg by mouth daily   Yes Historical Provider, MD   pantoprazole (PROTONIX) 40 mg tablet Take 1 tablet (40 mg total) by mouth daily 6/20/23  Yes Sarahy Dang MD   tamsulosin (FLOMAX) 0.4 mg Take 1 capsule (0.4 mg total) by mouth daily with dinner 6/6/23  Yes Ana María De La Torre MD   ticagrelor (BRILINTA) 90 MG Take 1 tablet (90 mg total) by mouth every 12 (twelve) hours 5/12/23  Yes Bettina Chauhan MD   traZODone (DESYREL) 50 mg tablet Take 1 tablet (50 mg total) by mouth daily at bedtime Do not start before June 6, 2023. 6/6/23  Yes Ana María De La Torre MD   albuterol (PROVENTIL HFA,[facility]) 90 mcg/act inhaler  5/8/23   Historical Provider, MD   Heparin Sodium, Porcine, (heparin, porcine,) 5,000 units/mL Inject 1 mL (5,000 Units total) under the skin every 8 (eight) hours Stop at discretion of nursing facility provider but likely within a few weeks or sooner Do not start before June 6, 2023.   Patient not taking: Reported on 6/28/2023 6/6/23   Leanne Nassar Irene Vásquez MD   pantoprazole (PROTONIX) 20 mg tablet  6/26/23   Historical Provider, MD   polyethylene glycol (MIRALAX) 17 g packet Take 17 g by mouth every morning Do not start before May 13, 2023. Patient not taking: Reported on 7/1/2023 5/13/23   Tiffany Mclean MD   senna-docusate sodium (SENOKOT S) 8.6-50 mg per tablet Take 2 tablets by mouth 2 (two) times a day  Patient not taking: Reported on 7/1/2023 5/12/23   Tiffany Mclean MD     I have reveiwed home medications using records provided by [facility]. Allergies: Allergies   Allergen Reactions   • Flexeril [Cyclobenzaprine] Drowsiness   • Lisinopril Cough   • Nsaids Confusion       Social History:     Marital Status: /Civil Union     Substance Use History:   Social History     Substance and Sexual Activity   Alcohol Use Never     Social History     Tobacco Use   Smoking Status Former   Smokeless Tobacco Never     Social History     Substance and Sexual Activity   Drug Use Never       Family History:    non-contributory    Physical Exam:     Vitals:   Blood Pressure: (!) 182/74 (07/01/23 1626)  Pulse: 62 (07/01/23 1626)  Temperature: 97.6 °F (36.4 °C) (07/01/23 1626)  Temp Source: Oral (07/01/23 1317)  Respirations: 18 (07/01/23 1530)  Weight - Scale: 95.4 kg (210 lb 5.1 oz) (07/01/23 1629)  SpO2: 98 % (07/01/23 1626)    Physical Exam  Vitals and nursing note reviewed. Constitutional:       General: He is not in acute distress. Appearance: He is well-developed. HENT:      Head: Normocephalic and atraumatic. Eyes:      Conjunctiva/sclera: Conjunctivae normal.   Cardiovascular:      Rate and Rhythm: Normal rate and regular rhythm. Heart sounds: No murmur heard. Pulmonary:      Effort: Pulmonary effort is normal. No respiratory distress. Breath sounds: Normal breath sounds. Abdominal:      Palpations: Abdomen is soft. Tenderness: There is no abdominal tenderness. Musculoskeletal:         General: No swelling. Cervical back: Neck supple. Skin:     General: Skin is warm and dry. Capillary Refill: Capillary refill takes less than 2 seconds. Neurological:      Mental Status: He is alert. Cranial Nerves: Cranial nerve deficit present. Comments: Likely dysarthria and aphasia slight left facial  droop   Psychiatric:         Mood and Affect: Mood normal.             Additional Data:     Lab Results: I have personally reviewed pertinent reports. Results from last 7 days   Lab Units 07/01/23  1328   WBC Thousand/uL 10.88*   HEMOGLOBIN g/dL 12.6   HEMATOCRIT % 37.5   PLATELETS Thousands/uL 272   NEUTROS PCT % 59   LYMPHS PCT % 25   MONOS PCT % 9   EOS PCT % 5     Results from last 7 days   Lab Units 07/01/23  1328   SODIUM mmol/L 142   POTASSIUM mmol/L 4.0   CHLORIDE mmol/L 108   CO2 mmol/L 27   BUN mg/dL 16   CREATININE mg/dL 1.17   ANION GAP mmol/L 7   CALCIUM mg/dL 9.5   GLUCOSE RANDOM mg/dL 95         Results from last 7 days   Lab Units 07/01/23  1627   POC GLUCOSE mg/dl 79               Imaging: I have personally reviewed pertinent reports. CT head wo contrast   Final Result by Robert Schmidt MD (07/01 1548)      Evidence of maturing left occipital parietal infarct. There is increased encephalomalacia. No new hemorrhage. Ventriculomegaly is disproportionate and could represent normal pressure hydrocephalus. Workstation performed: [id number]               [employer] Records Reviewed: Yes     ** Please Note: This note has been constructed using a voice recognition system.  **

## 2023-07-02 ENCOUNTER — APPOINTMENT (OUTPATIENT)
Dept: MRI IMAGING | Facility: HOSPITAL | Age: 63
DRG: 057 | End: 2023-07-02
Payer: COMMERCIAL

## 2023-07-02 LAB
CHOLEST SERPL-MCNC: 94 MG/DL
EST. AVERAGE GLUCOSE BLD GHB EST-MCNC: 105 MG/DL
HBA1C MFR BLD: 5.3 %
HDLC SERPL-MCNC: 30 MG/DL
LDLC SERPL CALC-MCNC: 30 MG/DL (ref 0–100)
TRIGL SERPL-MCNC: 168 MG/DL

## 2023-07-02 PROCEDURE — 99232 SBSQ HOSP IP/OBS MODERATE 35: CPT | Performed by: HOSPITALIST

## 2023-07-02 PROCEDURE — 70551 MRI BRAIN STEM W/O DYE: CPT

## 2023-07-02 PROCEDURE — 80061 LIPID PANEL: CPT | Performed by: INTERNAL MEDICINE

## 2023-07-02 PROCEDURE — 99254 IP/OBS CNSLTJ NEW/EST MOD 60: CPT | Performed by: PSYCHIATRY & NEUROLOGY

## 2023-07-02 PROCEDURE — 83036 HEMOGLOBIN GLYCOSYLATED A1C: CPT | Performed by: INTERNAL MEDICINE

## 2023-07-02 RX ORDER — HYDRALAZINE HYDROCHLORIDE 20 MG/ML
5 INJECTION INTRAMUSCULAR; INTRAVENOUS EVERY 6 HOURS PRN
Status: DISCONTINUED | OUTPATIENT
Start: 2023-07-02 | End: 2023-07-02

## 2023-07-02 RX ORDER — HYDRALAZINE HYDROCHLORIDE 20 MG/ML
5 INJECTION INTRAMUSCULAR; INTRAVENOUS EVERY 6 HOURS PRN
Status: DISCONTINUED | OUTPATIENT
Start: 2023-07-02 | End: 2023-07-12 | Stop reason: HOSPADM

## 2023-07-02 RX ADMIN — METOPROLOL SUCCINATE 100 MG: 50 TABLET, EXTENDED RELEASE ORAL at 08:59

## 2023-07-02 RX ADMIN — TAMSULOSIN HYDROCHLORIDE 0.4 MG: 0.4 CAPSULE ORAL at 17:06

## 2023-07-02 RX ADMIN — TICAGRELOR 90 MG: 90 TABLET ORAL at 09:00

## 2023-07-02 RX ADMIN — CILOSTAZOL 100 MG: 50 TABLET ORAL at 17:07

## 2023-07-02 RX ADMIN — ATORVASTATIN CALCIUM 40 MG: 40 TABLET, FILM COATED ORAL at 08:59

## 2023-07-02 RX ADMIN — BACLOFEN 5 MG: 10 TABLET ORAL at 20:41

## 2023-07-02 RX ADMIN — TICAGRELOR 90 MG: 90 TABLET ORAL at 20:41

## 2023-07-02 RX ADMIN — HEPARIN SODIUM 5000 UNITS: 5000 INJECTION INTRAVENOUS; SUBCUTANEOUS at 14:27

## 2023-07-02 RX ADMIN — LIDOCAINE 5% 1 PATCH: 700 PATCH TOPICAL at 08:58

## 2023-07-02 RX ADMIN — CILOSTAZOL 100 MG: 50 TABLET ORAL at 05:55

## 2023-07-02 RX ADMIN — PANTOPRAZOLE SODIUM 40 MG: 40 TABLET, DELAYED RELEASE ORAL at 08:59

## 2023-07-02 RX ADMIN — HEPARIN SODIUM 5000 UNITS: 5000 INJECTION INTRAVENOUS; SUBCUTANEOUS at 20:42

## 2023-07-02 RX ADMIN — ASPIRIN 81 MG: 81 TABLET, COATED ORAL at 09:00

## 2023-07-02 RX ADMIN — BACLOFEN 5 MG: 10 TABLET ORAL at 08:59

## 2023-07-02 RX ADMIN — DONEPEZIL HYDROCHLORIDE 10 MG: 5 TABLET ORAL at 20:42

## 2023-07-02 RX ADMIN — HEPARIN SODIUM 5000 UNITS: 5000 INJECTION INTRAVENOUS; SUBCUTANEOUS at 05:55

## 2023-07-02 RX ADMIN — TRAZODONE HYDROCHLORIDE 50 MG: 50 TABLET ORAL at 20:41

## 2023-07-02 NOTE — UTILIZATION REVIEW
Initial Clinical Review    WAS OBSERVATION 7/1/23 @ 1530 CONVERTED TO INPATIENT ADMISSION 7/3/23 @@ 1351 DUE TO CONTINUED STAY REQUIRED TO CARE FOR PATIENT WITH DX: STROKE . Admission: Date/Time/Statement:   Admission Orders (From admission, onward)     Ordered        07/03/23 1351  Inpatient Admission  Once            07/01/23 1530  Place in Observation  Once                      Orders Placed This Encounter   Procedures   • Inpatient Admission     Standing Status:   Standing     Number of Occurrences:   1     Order Specific Question:   Level of Care     Answer:   Hospice Routine Care [21]     Order Specific Question:   Estimated length of stay     Answer:   More than 2 Midnights     Order Specific Question:   Certification     Answer:   I certify that inpatient services are medically necessary for this patient for a duration of greater than two midnights. See H&P and MD Progress Notes for additional information about the patient's course of treatment. ED Arrival Information     Expected   -    Arrival   7/1/2023 13:15    Acuity   Urgent            Means of arrival   Ambulance    Escorted by   [facility]    Service   Hospitalist    Admission type   Emergency            Arrival complaint   headache           Chief Complaint   Patient presents with   • Headache     Pt to ED from home via EMS c/o headache x 1 week. Pt recent Hx of stroke, wife concerned this morning because he seemed more tired than usual.       Initial Presentation: 58 y.o. male to ED via EMS from home  Present to ED upon concern of visiting nurse. The patient is that according to the patient multiple falls at home since released from postacute rehab. Visiting nurse found that the patient had fallen again today.  Per patients wife the patient has been unable to perform ADL and has experienced more difficulties with walking  PMHX multiple strokes (most recent being may 2023), DM; Dyslipidemia; HLD; HTN; s/p left ICA PTA with stenting on May 4, 2023  Admitted to OBS with DX: Stroke  on exam: hypertensive; Cranial nerve deficit present - dysarthria and aphasia slight left facial  droop ; while in ED (+) blurred vision with vision changes - On exam, pt does have visual field deficits in left inferomedial field and right inferolateral field. CT negative for acute finding, evolving left parietal stroke  PLAN: cont asa and brilinta; neuro checks; Cardiopulmonary monitoring; monitor labs; PT / OT eval / tx     Date: 7/2/23  Day 2 OBSERVATION  Pt family reported change in vision, however, pt states feels a lot better; [facility] 10.88  Plan: cont asa and brilinta; neuro checks; Cardiopulmonary monitoring; monitor labs; PT / OT eval / tx ; f/u MRI; consult Neuro    NEUROLOGY CONSULT: eval for acute cva  Gait dysfunction multifactorial. No radiographic or clinical evidence to suggest acute neurologic event including another acute cva. Suspicion for fatigue possible doing a little bit more activities that he can currently tolerate and needs to build up more slowly. He has clinical evidence of large fiber neuropathy with his proprioceptive deficits, deconditioning, mild rt sided weakness from recent stroke along with the rt homonymous hemianopsia, prior spasticity from old rt mca cva also contributing along with possible NPH. Pt has subacute known left temperoparietooccipital cva and had completed [facility] rehab and discharged home on the first week of June. Since discharge he's been unable to perform his ADLs and has been experiencing more difficulties with walking. Mri brain w/o contrast today appears to be similar to prior mri from 5/2/23 with improving dwi signal given the time that has gone by and more importantly no acute cva. Speech is slow however since the stroke he has improved significantly from the speech standpoint. Pt has had some rue/rle weakness since the cva.  Furthermore per neurosurgery recent note also has a field cut which could explain his visual symptoms that can fluctuate and per radiographic location of recent stroke, visual deficits can be explained by this. Do not appear he is having seizures and it appears headache may have contributed as well. Plan: Continue PT; Continue pletal, brilinta, aspirin, lipitor regimen; No further inpatient neuro recs.       Date: 7/3/23 - CHANGED TO INPATIENT  Plan: f/u EEG; cont asa and brilinta; neuro checks; Cardiopulmonary monitoring; monitor labs; PT / OT eval / tx      ED Triage Vitals [07/01/23 1317]   Temperature Pulse Respirations Blood Pressure SpO2   98.4 °F (36.9 °C) 60 18 154/67 98 %      Temp Source Heart Rate Source Patient Position - Orthostatic VS BP Location FiO2 (%)   Oral Monitor Sitting Left arm --      Pain Score       3          Wt Readings from Last 1 Encounters:   07/03/23 92.6 kg (204 lb 3.2 oz)     Additional Vital Signs:   Date/Time Temp Pulse Resp BP MAP (mmHg) SpO2 O2 Device Patient Position - Orthostatic VS   07/03/23 14:00:03 97.9 °F (36.6 °C) 64 16 143/67 92 94 % -- --   07/03/23 07:07:53 97.7 °F (36.5 °C) 63 17 141/67 92 94 % -- --   07/02/23 21:10:09 97.7 °F (36.5 °C) 70 16 145/68 94 93 % -- --   07/02/23 14:38:32 98.2 °F (36.8 °C) 62 15 154/76 102 96 % -- --       Date/Time Temp Pulse Resp BP MAP (mmHg) SpO2 O2 Device Patient Position - Orthostatic VS   07/02/23 06:56:24 97.8 °F (36.6 °C) 58 -- 166/81 109 97 % -- --   07/02/23 06:54:58 97.8 °F (36.6 °C) 61 17 -- -- 98 % -- --   07/02/23 06:54:57 -- -- -- 187/68 Abnormal  108 -- -- --   07/02/23 05:56:44 -- 51 Abnormal  -- 110/89 96 96 % -- --   07/02/23 0338 -- 62 -- 162/85 111 98 % -- --   07/02/23 02:24:31 97.4 °F (36.3 °C) Abnormal  48 Abnormal  -- 182/86 Abnormal  118 97 % -- --   07/01/23 23:36:43 -- 69 -- 115/53 74 93 % -- --   07/01/23 2216 -- 80 -- 140/62 88 96 % -- --   07/01/23 22:15:53 -- 80 -- 140/62 88 96 % -- --   07/01/23 21:13:35 -- 71 -- 144/76 99 96 % -- --   07/01/23 19:59:06 97.6 °F (36.4 °C) 67 -- 157/77 104 96 % -- --   07/01/23 16:26:35 97.6 °F (36.4 °C) 62 -- 182/74 Abnormal  110 98 % -- --   07/01/23 1530 -- 54 Abnormal  18 167/71 102 99 % None (Room air) Lying   07/01/23 1415 -- 53 Abnormal  16 172/70 Abnormal  -- 99 % None (Room air) Lying   07/01/23 1325 -- -- -- -- -- -- None (Room air) --   07/01/23 1317 98.4 °F (36.9 °C) 60 18 154/67 -- 98 % None (Room air) Sitting         EKG: None obtained      Pertinent Labs/Diagnostic Test Results:   MRI brain wo contrast   Final Result by HAL Dueñas MD (07/02 1316)      Redemonstration of evolving left parietal/occipital infarct with residual but improving DWI signal that is now mostly normalized on ADC. No acute infarct or hemorrhage. Stable ventricular prominence, correlate for NPH. Workstation performed: [id number]         CT head wo contrast   Final Result by Miracle Zhu MD (07/01 1548)      Evidence of maturing left occipital parietal infarct. There is increased encephalomalacia. No new hemorrhage. Ventriculomegaly is disproportionate and could represent normal pressure hydrocephalus.                   Workstation performed: [id number]         XR shoulder 2+ vw left    (Results Pending)   XR shoulder 2+ vw right    (Results Pending)         Results from last 7 days   Lab Units 07/03/23  0352 07/01/23  1328   WBC Thousand/uL 9.74 10.88*   HEMOGLOBIN g/dL 11.6* 12.6   HEMATOCRIT % 35.0* 37.5   PLATELETS Thousands/uL 246 272   NEUTROS ABS Thousands/µL 5.53 6.39         Results from last 7 days   Lab Units 07/03/23  0352 07/01/23  1328   SODIUM mmol/L 140 142   POTASSIUM mmol/L 3.3* 4.0   CHLORIDE mmol/L 109* 108   CO2 mmol/L 26 27   ANION GAP mmol/L 5 7   BUN mg/dL 18 16   CREATININE mg/dL 0.98 1.17   EGFR ml/min/1.73sq m 81 65   CALCIUM mg/dL 8.9 9.5     Results from last 7 days   Lab Units 07/03/23  0352   AST U/L 12*   ALT U/L 13   ALK PHOS U/L 72   TOTAL PROTEIN g/dL 5.6*   ALBUMIN g/dL 3.4*   TOTAL BILIRUBIN mg/dL 0.57 Results from last 7 days   Lab Units 07/01/23  1627   POC GLUCOSE mg/dl 79     Results from last 7 days   Lab Units 07/03/23  0352 07/01/23  1328   GLUCOSE RANDOM mg/dL 89 95       ED Treatment:   Medication Administration from 07/01/2023 1315 to 07/01/2023 1616       Date/Time Order Dose Route Action     07/01/2023 1411 EDT sodium chloride 0.9 % bolus 1,000 mL 1,000 mL Intravenous New Bag          Present on Admission:  • Fall  • Memory deficits  • Type 2 diabetes mellitus (HCC)  • Possible NPH (normal pressure hydrocephalus) ([facility])  • Stroke [facility])      Admitting Diagnosis: Headache [R51.9]     Age/Sex: 58 y.o. male     Admission Orders: SCDs; neuro checks; I/S; Cardiopulmonary monitoring; daily wts; I/O; aspiration precautions; regular diet    Scheduled Medications:  aspirin, 81 mg, Oral, Daily  atorvastatin, 40 mg, Oral, Daily With Breakfast  baclofen, 5 mg, Oral, Q12H  cilostazol, 100 mg, Oral, BID AC  donepezil, 10 mg, Oral, HS  heparin (porcine), 5,000 Units, Subcutaneous, Q8H JOSE  lidocaine, 1 patch, Topical, Daily  metoprolol succinate, 100 mg, Oral, Daily  pantoprazole, 40 mg, Oral, Daily  polyethylene glycol, 17 g, Oral, QAM  senna-docusate sodium, 2 tablet, Oral, BID  tamsulosin, 0.4 mg, Oral, Daily With Dinner  ticagrelor, 90 mg, Oral, Q12H JOSE  traZODone, 50 mg, Oral, HS      Continuous IV Infusions:  none     PRN Meds:  acetaminophen, 975 mg, Oral, TID PRN  albuterol, 1 puff, Inhalation, Q4H PRN        IP CONSULT TO CASE MANAGEMENT  IP CONSULT TO NEUROLOGY  IP CONSULT TO NUTRITION SERVICES    Network Utilization Review Department  ATTENTION: Please call with any questions or concerns to [phone] and carefully listen to the prompts so that you are directed to the right person.  All voicemails are confidential.  Terence Benito all requests for admission clinical reviews, approved or denied determinations and any other requests to dedicated fax number below belonging to the campus where the patient is receiving treatment.  List of dedicated fax numbers for the Facilities:  [place] DENIALS (Administrative/Medical Necessity) [phone] [address] (Maternity/NICU/Pediatrics) [phone]   [address] [place] [address] [phone]   [facility] [address] [facility] [phone] [address] [facility] [address] [facility] [address] [phone]   [address] [address] [place] [address] [facility] [phone]

## 2023-07-02 NOTE — ASSESSMENT & PLAN NOTE
Lab Results   Component Value Date    HGBA1C 5.3 07/02/2023       Recent Labs     07/01/23  1627   POCGLU 79       Blood Sugar Average: Last 72 hrs:  (P) 79     A1c 5.8  No concerns at this time  Not on any agent

## 2023-07-02 NOTE — CONSULTS
Consult received for stroke pathway. Noting A1c of 5.3, TRIG of 168 previously noted to be 246 > 176. HDL of 30 noted. Pt reports fair appetite, says his portion sizes have decreased with slight decrease in appetite. Says his wife manages the meals at home and she has been trying to serve heart healthy foods, "But I still like my foods." Nonsignificant weight loss noted. Reviewed labs with pt. Pt declining diet instruction. Says he has stopped smoking which was very difficult for him. Encouraged dietary changes which may be easier to do than quitting smoking which he has already done. Pt appears somewhat motivated. Offered to call wife to discuss though pt declined, "She already knows what we should be eating."     Continue diet as ordered.

## 2023-07-02 NOTE — PROGRESS NOTES
[address] [facility]  Progress Note  Name: Candice Gaines  MRN: [id number]  Unit/Bed#: -01 I Date of Admission: 7/1/2023   Date of Service: 7/2/2023 I Hospital Day: 0    Assessment/Plan   Possible NPH (normal pressure hydrocephalus) ([address])  Assessment & Plan  He has established follow-up with neurosurgery, no need for further intervention while in the hospital  No Need for urgent lumbar puncture    Memory deficits  Assessment & Plan  Positive care and Namenda, monitor for sundowning, strict fall precautions    Type 2 diabetes mellitus ([address])  Assessment & Plan  Lab Results   Component Value Date    HGBA1C 5.3 07/02/2023       Recent Labs     07/01/23  1627   POCGLU 79       Blood Sugar Average: Last 72 hrs:  (P) 79     A1c 5.8  No concerns at this time  Not on any agent    [address]  Please refer to principal    * Stroke [facility])  Assessment & Plan  History of multiple stroke  Hospitalized May for multifocal ischemic infarcts from distributions with bilateral carotid stenosis  -Underwent left ICA PTA with stenting on May 4  -Originally it was on Eliquis and aspirin and after recent stroke he was transitioned to aspirin and Brilinta  -Also on Pletal for previous diagnosis of peripheral vascular disease  -During recent hospitalization Eliquis was discontinued without plan to be resumed as neurosurgery who evaluated also the patient together with the neurology felt that strokes are less likely embolic in nature  -Vasculitis work-up was negative  -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 for which outpatient follow-up with neurosurgery was suggested  -He continues on Lipitor 40 mg/day  -He has aphasia and memory loss with ambulatory dysfunction and reported hemiplegia on the nondominant side as effect of the stroke  -Loop recorder replaced in 2019 for previous stroke and reported as negative however, there is a plan to insert the new loop recorder for the patient in the near future with cardiology  -He has completed [facility] rehabilitation and was discharged home in the first week of   -Since being home as per ER spoke with wife the patient has been unable to perform ADL and has experienced more difficulties with walking  -Today he had a fall and was brought to the emergency department as per advice of visiting nurse who felt that the patient will require higher level of care that wife can no longer provide  -CT head in the emergency department negative for acute finding, evolving left parietal stroke  -He appears to be at baseline with some aphasia/dysarthria some memory impairment  -Wife reports that the patient is impulsive at home, not witnessed in the emergency department however  -Resume all chronic medications  -Strict fall precautions  -Strict aspiration precaution  -PT/OT and  evaluation possible placement at least short-term  -Pt family reported change in vision, more lethargy, issues with balance PTA. Apprec neuro inpt. Await mri brain. Check echo. Fortunately he appears improved today per his report. VTE  Prophylaxis:   Pharmacologic: in place  Mechanical VTE Prophylaxis in Place: Yes    Patient Centered Rounds: I have performed bedside rounds with nursing staff today. Discussions with Specialists or Other Care Team Provider: case management    Education and Discussions with Family / Patient: pt wife via telephone. Current Length of Stay: 0 day(s)    Current Patient Status: Observation        Code Status: Level 1 - Full Code    Discharge Plan: Pt will require continued inpatient hospitalization. Subjective:   Pt states feels a lot better  Denies visual disturbances    Patient is seen and examined at bedside. All other ROS are negative.     Objective:     Vitals:   Temp (24hrs), Av.8 °F (36.6 °C), Min:97.4 °F (36.3 °C), Max:98.4 °F (36.9 °C)    Temp:  [97.4 °F (36.3 °C)-98.4 °F (36.9 °C)] 97.8 °F (36.6 °C)  HR:  [48-80] 58  Resp:  [16-18] 17  BP: (110-187)/(53-89) 166/81  SpO2:  [93 %-99 %] 97 %  Body mass index is 31.78 kg/m². Input and Output Summary (last 24 hours): Intake/Output Summary (Last 24 hours) at 7/2/2023 1255  Last data filed at 7/2/2023 0900  Gross per 24 hour   Intake 450 ml   Output 600 ml   Net -150 ml       Physical Exam:       GEN: No acute distress, comfortable  HEEENT: No JVD, PERRLA, no scleral icterus  RESP: Lungs clear to auscultation bilaterally  CV: RRR, +s1/s2   ABD: SOFT NON TENDER, POSITIVE BOWEL SOUNDS, NO DISTENTION  PSYCH: CALM  NEURO: Mentation baseline, slight dysarthria, NO FOCAL DEFICITS  SKIN: NO RASH  EXTREM: NO EDEMA    Additional Data:     Labs:    Results from last 7 days   Lab Units 07/01/23  1328   WBC Thousand/uL 10.88*   HEMOGLOBIN g/dL 12.6   HEMATOCRIT % 37.5   PLATELETS Thousands/uL 272   NEUTROS PCT % 59   LYMPHS PCT % 25   MONOS PCT % 9   EOS PCT % 5     Results from last 7 days   Lab Units 07/01/23  1328   SODIUM mmol/L 142   POTASSIUM mmol/L 4.0   CHLORIDE mmol/L 108   CO2 mmol/L 27   BUN mg/dL 16   CREATININE mg/dL 1.17   ANION GAP mmol/L 7   CALCIUM mg/dL 9.5   GLUCOSE RANDOM mg/dL 95         Results from last 7 days   Lab Units 07/01/23  1627   POC GLUCOSE mg/dl 79     Results from last 7 days   Lab Units 07/02/23  0330   HEMOGLOBIN A1C % 5.3           Lines/Drains:  Invasive Devices     Peripheral Intravenous Line  Duration           Peripheral IV 07/01/23 Right Antecubital <1 day                Telemetry:   Telemetry Orders (From admission, onward)             24 Hour Telemetry Monitoring  Continuous x 24 Hours (Telem)        Question:  Reason for 24 Hour Telemetry  Answer:  TIA/Suspected CVA/ Confirmed CVA                    * I Have Reviewed All Lab Data Listed Above. Imaging:     Results for orders placed during the hospital encounter of 04/16/23    XR chest 1 view portable    Narrative  CHEST    INDICATION:   Altered mental status.     COMPARISON:  Chest radiograph 1/12/2023. EXAM PERFORMED/VIEWS:  XR CHEST PORTABLE  AP semierect    FINDINGS:    Left hemithorax loop recorder. Cardiomediastinal silhouette appears unremarkable. The lungs are clear. No pneumothorax or pleural effusion. Osseous structures appear within normal limits for patient age. Impression  No acute cardiopulmonary disease. Workstation performed: [id number]    Results for orders placed during the hospital encounter of 04/26/23    XR chest pa & lateral    Narrative  CHEST    INDICATION:   RLL crackles r/o pneumonia. COMPARISON: 4/16/2023    EXAM PERFORMED/VIEWS:  XR CHEST PA & LATERAL      FINDINGS: Loop recorder is identified. Cardiomediastinal silhouette appears unremarkable. The lungs are clear. No pneumothorax or pleural effusion. Osseous structures appear within normal limits for patient age. Impression  No acute cardiopulmonary disease.             Workstation performed: [id number]      *I have reviewed all imaging reports listed above      Recent Cultures (last 7 days):           Last 24 Hours Medication List:   Current Facility-Administered Medications   Medication Dose Route Frequency Provider Last Rate   • acetaminophen  975 mg Oral TID PRN Rossana Infante MD     • albuterol  1 puff Inhalation Q4H PRN Rossana Infante MD     • aspirin  81 mg Oral Daily Rossana Infante MD     • atorvastatin  40 mg Oral Daily With Breakfast Rossana Infante MD     • baclofen  5 mg Oral Q12H Odalys Butts PA-C     • cilostazol  100 mg Oral BID AC Rossana Infante MD     • donepezil  10 mg Oral HS Odalys Butts PA-C     • heparin (porcine)  5,000 Units Subcutaneous Q8H [address] Rossana Infante MD     • hydrALAZINE  5 mg Intravenous Q6H PRN Yuliet Knox MD     • lidocaine  1 patch Topical Daily Odalys Butts PA-C     • metoprolol succinate  100 mg Oral Daily Rossana Infante MD     • pantoprazole  40 mg Oral Daily Rossana Infante MD • polyethylene glycol  17 g Oral QAM Mg Olsen MD     • senna-docusate sodium  2 tablet Oral BID Mg Olsen MD     • tamsulosin  0.4 mg Oral Daily With Rocael Barriga MD     • ticagrelor  90 mg Oral Q12H [facility] Mg Olsen MD     • traZODone  50 mg Oral HS Mg Olsen MD          Today, Patient Was Seen By: Irby Schwab, MD    ** Please Note: Dictation voice to text software may have been used in the creation of this document.  **

## 2023-07-02 NOTE — PLAN OF CARE
Problem: Potential for Falls  Goal: Patient will remain free of falls  Description: INTERVENTIONS:  - Educate patient/family on patient safety including physical limitations  - Instruct patient to call for assistance with activity   - Consult OT/PT to assist with strengthening/mobility   - Keep Call bell within reach  - Keep bed low and locked with side rails adjusted as appropriate  - Keep care items and personal belongings within reach  - Initiate and maintain comfort rounds  - Make Fall Risk Sign visible to staff  - Offer Toileting every 2 Hours, in advance of need  - Initiate/Maintain bed alarm  - Obtain necessary fall risk management equipment: alarm, socks  - Apply yellow socks and bracelet for high fall risk patients  - Consider moving patient to room near nurses station  Outcome: Progressing     Problem: MOBILITY - ADULT  Goal: Maintain or return to baseline ADL function  Description: INTERVENTIONS:  -  Assess patient's ability to carry out ADLs; assess patient's baseline for ADL function and identify physical deficits which impact ability to perform ADLs (bathing, care of mouth/teeth, toileting, grooming, dressing, etc.)  - Assess/evaluate cause of self-care deficits   - Assess range of motion  - Assess patient's mobility; develop plan if impaired  - Assess patient's need for assistive devices and provide as appropriate  - Encourage maximum independence but intervene and supervise when necessary  - Involve family in performance of ADLs  - Assess for home care needs following discharge   - Consider OT consult to assist with ADL evaluation and planning for discharge  - Provide patient education as appropriate  Outcome: Progressing  Goal: Maintains/Returns to pre admission functional level  Description: INTERVENTIONS:  - Perform BMAT or MOVE assessment daily.   - Set and communicate daily mobility goal to care team and patient/family/caregiver.    - Collaborate with rehabilitation services on mobility goals if consulted  - Perform Range of Motion 3 times a day. - Reposition patient every 2 hours.   - Dangle patient 3 times a day  - Stand patient 3 times a day  - Ambulate patient 3 times a day  - Out of bed to chair 3 times a day   - Out of bed for meals 3 times a day  - Out of bed for toileting  - Record patient progress and toleration of activity level   Outcome: Progressing     Problem: PAIN - ADULT  Goal: Verbalizes/displays adequate comfort level or baseline comfort level  Description: Interventions:  - Encourage patient to monitor pain and request assistance  - Assess pain using appropriate pain scale  - Administer analgesics based on type and severity of pain and evaluate response  - Implement non-pharmacological measures as appropriate and evaluate response  - Consider cultural and social influences on pain and pain management  - Notify physician/advanced practitioner if interventions unsuccessful or patient reports new pain  Outcome: Progressing     Problem: INFECTION - ADULT  Goal: Absence or prevention of progression during hospitalization  Description: INTERVENTIONS:  - Assess and monitor for signs and symptoms of infection  - Monitor lab/diagnostic results  - Monitor all insertion sites, i.e. indwelling lines, tubes, and drains  - Administer medications as ordered  - Instruct and encourage patient and family to use good hand hygiene technique  - Identify and instruct in appropriate isolation precautions for identified infection/condition  Outcome: Progressing     Problem: SAFETY ADULT  Goal: Patient will remain free of falls  Description: INTERVENTIONS:  - Educate patient/family on patient safety including physical limitations  - Instruct patient to call for assistance with activity   - Consult OT/PT to assist with strengthening/mobility   - Keep Call bell within reach  - Keep bed low and locked with side rails adjusted as appropriate  - Keep care items and personal belongings within reach  - Initiate and maintain comfort rounds  - Make Fall Risk Sign visible to staff  - Offer Toileting every 2 Hours, in advance of need  - Initiate/Maintain bed alarm  - Obtain necessary fall risk management equipment: alarm, socks  - Apply yellow socks and bracelet for high fall risk patients  - Consider moving patient to room near nurses station  Outcome: Progressing  Goal: Maintain or return to baseline ADL function  Description: INTERVENTIONS:  -  Assess patient's ability to carry out ADLs; assess patient's baseline for ADL function and identify physical deficits which impact ability to perform ADLs (bathing, care of mouth/teeth, toileting, grooming, dressing, etc.)  - Assess/evaluate cause of self-care deficits   - Assess range of motion  - Assess patient's mobility; develop plan if impaired  - Assess patient's need for assistive devices and provide as appropriate  - Encourage maximum independence but intervene and supervise when necessary  - Involve family in performance of ADLs  - Assess for home care needs following discharge   - Consider OT consult to assist with ADL evaluation and planning for discharge  - Provide patient education as appropriate  Outcome: Progressing  Goal: Maintains/Returns to pre admission functional level  Description: INTERVENTIONS:  - Perform BMAT or MOVE assessment daily.   - Set and communicate daily mobility goal to care team and patient/family/caregiver. - Collaborate with rehabilitation services on mobility goals if consulted  - Perform Range of Motion 3 times a day. - Reposition patient every 2 hours.   - Dangle patient 3 times a day  - Stand patient 3 times a day  - Ambulate patient 3 times a day  - Out of bed to chair 3 times a day   - Out of bed for meals 3 times a day  - Out of bed for toileting  - Record patient progress and toleration of activity level   Outcome: Progressing     Problem: DISCHARGE PLANNING  Goal: Discharge to home or other facility with appropriate resources  Description: INTERVENTIONS:  - Identify barriers to discharge w/patient and caregiver  - Arrange for needed discharge resources and transportation as appropriate  - Identify discharge learning needs (meds, wound care, etc.)  - Arrange for interpretive services to assist at discharge as needed  - Refer to Case Management Department for coordinating discharge planning if the patient needs post-hospital services based on physician/advanced practitioner order or complex needs related to functional status, cognitive ability, or social support system  Outcome: Progressing     Problem: Knowledge Deficit  Goal: Patient/family/caregiver demonstrates understanding of disease process, treatment plan, medications, and discharge instructions  Description: Complete learning assessment and assess knowledge base.   Interventions:  - Provide teaching at level of understanding  - Provide teaching via preferred learning methods  Outcome: Progressing     Problem: Prexisting or High Potential for Compromised Skin Integrity  Goal: Skin integrity is maintained or improved  Description: INTERVENTIONS:  - Identify patients at risk for skin breakdown  - Assess and monitor skin integrity  - Assess and monitor nutrition and hydration status  - Monitor labs   - Assess for incontinence   - Turn and reposition patient  - Assist with mobility/ambulation  - Relieve pressure over bony prominences  - Avoid friction and shearing  - Provide appropriate hygiene as needed including keeping skin clean and dry  - Evaluate need for skin moisturizer/barrier cream  - Collaborate with interdisciplinary team   - Patient/family teaching  - Consider wound care consult   Outcome: Progressing

## 2023-07-02 NOTE — CONSULTS
Consultation - Neurology   Russ President 58 y.o. male MRN: [id number]  Unit/Bed#: -01 Encounter: [id number]      Physician Requesting Consult: Enoch Ash MD  Inpatient consult to Neurology  Consult performed by: Naila Ryan MD  Consult ordered by: Sheryle Hila, PA-C        Reason for Consult / Principal Problem: eval for acute cva      Assessment:  Gait dysfunction multifactorial. No radiographic or clinical evidence to suggest acute neurologic event including another acute cva. Suspicion for fatigue possible doing a little bit more activities that he can currently tolerate and needs to build up more slowly. He has clinical evidence of large fiber neuropathy with his proprioceptive deficits, deconditioning, mild rt sided weakness from recent stroke along with the rt homonymous hemianopsia, prior spasticity from old rt mca cva also contributing along with possible NPH. Patient himself feels this could have been the cause. Pt has subacute known left temperoparietooccipital cva and had completed [facility] rehab and discharged home on the first week of June. Since discharge he's been unable to perform his ADLs and has been experiencing more difficulties with walking. Mri brain w/o contrast today appears to be similar to prior mri from 5/2/23 with improving dwi signal given the time that has gone by and more importantly no acute cva. Speech is slow however since the stroke he has improved significantly from the speech standpoint. Pt has had some rue/rle weakness since the cva. Furthermore per neurosurgery recent note also has a field cut which could explain his visual symptoms that can fluctuate and per radiographic location of recent stroke, visual deficits can be explained by this. Do not appear he is having seizures and it appears headache may have contributed as well. Plan:  No further inpatient neuro recs.   Continue PT  Continue pletal, brilinta, aspirin, lipitor regimen  6 month cta head/neck repeat in outpt setting per neurosurgery  Obtain X ray of left shoulder as he has point tenderness and feels pain is worsening, chronic symptoms    HPI: Taylor Pettit is a 58 y.o. male who aspirin, Pletal, Brilinta, Lipitor for history of recurrent strokes most likely atheroembolic in  the setting of bilateral extracranial/intracranial carotid/mca disease most recent strokes being on the left inferior division MCA territory. S/p left ica stent. On pletal, brilinta, aspirin, lipitor regiment. He presents after a fall. No LOC. He says he's had multiple falls at home since coming home from rehab. Visiting nurse found him down a couple days ago and felt he needed to come in for hospitalization. He has had numerous stroke work-up since beginning of the year starting with January 2023 presented with increased confusion MRI brain scan is negative for acute infarcts at that time were noted to have chronic strokes in the left cerebellum, right frontoparietal corona radiata region, left boaz atrial white matter and severe chronic microangiopathy. April 17 stroke alert and is found on MRI to have punctate acute to subacute left parieto-occipital strokes. April 26 presented as another stroke alert with MRI brain scan showing increased size of the restriction diffusion in the left parietal subcortical and periventricular white matter regions consistent with acute to subacute ischemia additional area of restriction diffusion in the right frontal parietal area and left periatrial and parieto-occipital regions. May 2 mri showing worsening of recent infarcts with new areas of acute cva in similar left sided distribution. May 30th mri mentions some new areas of left parietal/occipital acute/subacute strokes. CTA is consistently showing severe stenosis proximal left M2, severe stenosis distal right M1 and 60 to 65% stenosis bilateral extracranial internal carotid arteries.  He underwent angioplasty and stenting of the left ICA on May 4th. He had been on eliquis and this was discontinued as strokes were felt to be less likely embolic and was taken off. He also has a history of prior rt mca thrombectomy in 2019 with evidence of intracranial stenosis. He is also following with outpt neurosurgery for possible NPH. He followed up with neurosurgeon Dr. Tosin Purcell on 6/28/23. Recent doppler showing patent stent. Plan per review of neurosurgery note is for cta head/neck in 6 months to evaluate the rt mca and the asymptomatic extracranial rt sided carotid stenosis. Per neurosurgen note he would have more expressive aphasia when frustrated and has a right superior quadrant field cut. Pt had followed up with outpt vascular neurologist Dr. Bonnie Malin on 6/13/28. No change in plan. ROS:  Per 12 point review intermittent mild expressive aphasia, memory loss, impulsivity, dysarthria, visual impairments, left shoulder pain, mild receptive aphasia, gait dysfunction     Historical Information   Past Medical History:   Diagnosis Date   • Depression    • Diabetes mellitus ([address])     patient denies   • Dyslipidemia 03/26/2019   • Hyperlipidemia    • Hypertension    • Stroke [facility])      Past Surgical History:   Procedure Laterality Date   • BACK SURGERY     • IR CEREBRAL ANGIOGRAPHY  5/4/2023   • IR STROKE ALERT  3/19/2019   • SHOULDER SURGERY       Social History   Social History     Tobacco Use   Smoking Status Former   Smokeless Tobacco Never     Social History     Substance and Sexual Activity   Alcohol Use Never     Social History     Substance and Sexual Activity   Drug Use Never       Family History: non-contributory      Meds/Allergies     Allergies   Allergen Reactions   • Flexeril [Cyclobenzaprine] Drowsiness   • Lisinopril Cough   • Nsaids Confusion       all current active meds have been reviewed    Objective   Vitals:Blood pressure 166/81, pulse 58, temperature 97.8 °F (36.6 °C), resp.  rate 17, height 5' 8" (1.727 m), weight 94.8 kg (208 lb 15.9 oz), SpO2 97 %. ,Body mass index is 31.78 kg/m². Physical Exam:   Physical Exam General appearance: alert, appears stated age and cooperative  Head: Normocephalic, without obvious abnormality, atraumatic  Extremities: extremities normal, atraumatic, no cyanosis or edema. Tenderness upon palpation of left anterior shoulder. Neurologic:  Cognitive:  Mental status: Alert, oriented to self, location, choices to produce the current month year date date of birth. He was able to correctly state it is July, needed options to pick from to come up with 2023, mild mixed receptive/receptive aphasia which he himself is aware of. Needed visual cues, multiple prompts at times. Has word finding difficulties, paraphrasic errors. CN: CNII-XII normal except rt homonymous hemioanopsia  Motor: increased tone rle>rue and tone in rt side in general is slightly more than lue/lle increase in tone. Cannot elevated lue to shoulder height. Rt deltoid 4+, left deltoid 4- pain limited. Rest of bilat UE 5. Rt hip flexion 4+, rest of rle is 5 power and lle is 5 power. Finger to finger tapping slower on the left side. Sensory: light touch intact ue/le bilat  Coordination: mild intention tremors bilat UE and slight past pointing may be related to depth perception issues. Minimal rle ataxia on heel to shin testing with some mild hesistency with lle testing but not exactly ataxic. Plantars: downgoing bilat  . Lab Results: I have personally reviewed pertinent reports.       Imaging Studies: I have personally reviewed pertinent films in PACS    EKG, Pathology, and Other Studies: I have personally reviewed pertinent films in PACS

## 2023-07-02 NOTE — ASSESSMENT & PLAN NOTE
History of multiple stroke  Hospitalized May for multifocal ischemic infarcts from distributions with bilateral carotid stenosis  -Underwent left ICA PTA with stenting on May 4  -Originally it was on Eliquis and aspirin and after recent stroke he was transitioned to aspirin and Brilinta  -Also on Pletal for previous diagnosis of peripheral vascular disease  -During recent hospitalization Eliquis was discontinued without plan to be resumed as neurosurgery who evaluated also the patient together with the neurology felt that strokes are less likely embolic in nature  -Vasculitis work-up was negative  -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 for which outpatient follow-up with neurosurgery was suggested  -He continues on Lipitor 40 mg/day  -He has aphasia and memory loss with ambulatory dysfunction and reported hemiplegia on the nondominant side as effect of the stroke  -Loop recorder replaced in 2019 for previous stroke and reported as negative however, there is a plan to insert the new loop recorder for the patient in the near future with cardiology  -He has completed [facility] rehabilitation and was discharged home in the first week of June  -Since being home as per ER spoke with wife the patient has been unable to perform ADL and has experienced more difficulties with walking  -Today he had a fall and was brought to the emergency department as per advice of visiting nurse who felt that the patient will require higher level of care that wife can no longer provide  -CT head in the emergency department negative for acute finding, evolving left parietal stroke  -He appears to be at baseline with some aphasia/dysarthria some memory impairment  -Wife reports that the patient is impulsive at home, not witnessed in the emergency department however  -Resume all chronic medications  -Strict fall precautions  -Strict aspiration precaution  -PT/OT and  evaluation possible placement at least short-term  -Pt family reported change in vision, more lethargy, issues with balance PTA. Apprec neuro inpt. Await mri brain. Check echo. Fortunately he appears improved today per his report.

## 2023-07-02 NOTE — SPEECH THERAPY NOTE
Speech Language/Pathology  Speech-Language Pathology Screen      Patient Name: Russ Presbenjamin    Today's Date: 7/2/2023     Problem List  Principal Problem:    Stroke [facility])  Active Problems:    Fall    Type 2 diabetes mellitus ([address])    Memory deficits    Possible NPH (normal pressure hydrocephalus) ([address])      Past Medical History  Past Medical History:   Diagnosis Date   • Depression    • Diabetes mellitus ([address])     patient denies   • Dyslipidemia 03/26/2019   • Hyperlipidemia    • Hypertension    • Stroke [facility])        Past Surgical History  Past Surgical History:   Procedure Laterality Date   • BACK SURGERY     • IR CEREBRAL ANGIOGRAPHY  5/4/2023   • IR STROKE ALERT  3/19/2019   • SHOULDER SURGERY         Summary   Order received, chart reviewed. Pt known to department from previous stay. Currently speaking and communicating with only min halting but able to communicate wants/needs. He states not change in swallow function, passed nsg screen, NIH 1 for language though pt w/ baseline mild aphasia. No need for formal assessment at this time. Please re consult if needed during stay. Current Medical Status  Pt is a 58 y.o. male who presented to [facility]  with headache, recent d/c from [facility] and has had difficulty with ADLs at home. Speech is slow however since the stroke he has improved significantly from the speech standpoint. Pt has had some rue/rle weakness since the cva. Furthermore per neurosurgery recent note also has a field cut which could explain his visual symptoms that can fluctuate. MRI head 7/2/23-Redemonstration of evolving left parietal/occipital infarct with residual but improving DWI signal that is now mostly normalized on ADC. No acute infarct or hemorrhage. Stable ventricular prominence, correlate for NPH.

## 2023-07-02 NOTE — ED PROVIDER NOTES
History  Chief Complaint   Patient presents with   • Headache     Pt to ED from home via EMS c/o headache x 1 week. Pt recent Hx of stroke, wife concerned this morning because he seemed more tired than usual.     79-year-old male presenting with persistent headache for the last few weeks. Patient states he came to the emergency department because his wife called 911. He seems unconcerned about his headache. Patient's spouse arrived shortly after and reported that he is unable to care for himself. He has been having outbursts and is unable to take care of activities of daily living. Patient was discharged from rehab a few days ago. Prior to Admission Medications   Prescriptions Last Dose Informant Patient Reported? Taking? Heparin Sodium, Porcine, (heparin, porcine,) 5,000 units/mL Not Taking  No No   Sig: Inject 1 mL (5,000 Units total) under the skin every 8 (eight) hours Stop at discretion of nursing facility provider but likely within a few weeks or sooner Do not start before June 6, 2023. Patient not taking: Reported on 6/28/2023   acetaminophen (TYLENOL) 325 mg tablet 7/1/2023  No Yes   Sig: Take 3 tablets (975 mg total) by mouth 3 (three) times a day as needed for mild pain, headaches or fever   albuterol (PROVENTIL HFA,VENTOLIN HFA) 90 mcg/act inhaler Not Taking  Yes No   Patient not taking: Reported on 7/1/2023   aspirin (ECOTRIN LOW STRENGTH) 81 mg EC tablet 7/1/2023 Self No Yes   Sig: Take 1 tablet (81 mg total) by mouth daily Do not start before April 19, 2023. atorvastatin (LIPITOR) 40 mg tablet 7/1/2023 Self, Spouse/Significant Other Yes Yes   Sig: Take 40 mg by mouth daily with breakfast   baclofen 5 MG TABS 7/1/2023  No Yes   Sig: Take 5 mg by mouth 2 (two) times a day as needed for muscle spasms   cilostazol (PLETAL) 100 mg tablet 7/1/2023  No Yes   Sig: Take 1 tablet (100 mg total) by mouth 2 (two) times a day before meals Do not start before June 6, 2023.    citalopram (CeleXA) 20 mg tablet 7/1/2023 Self Yes Yes   Sig: Take 20 mg by mouth daily   donepezil (ARICEPT) 10 mg tablet 7/1/2023  No Yes   Sig: Take 1 tablet (10 mg total) by mouth in the morning   lidocaine (Lidoderm) 5 % 7/1/2023  No Yes   Sig: Apply 1 patch topically over 12 hours daily Remove & Discard patch within 12 hours or as directed by MD   losartan (COZAAR) 50 mg tablet 7/1/2023  Yes Yes   Sig: Take 50 mg by mouth daily   metoprolol succinate (TOPROL-XL) 100 mg 24 hr tablet 7/1/2023 Self Yes Yes   Sig: Take 100 mg by mouth daily   pantoprazole (PROTONIX) 20 mg tablet Not Taking  Yes No   Patient not taking: Reported on 7/1/2023   pantoprazole (PROTONIX) 40 mg tablet 7/1/2023  No Yes   Sig: Take 1 tablet (40 mg total) by mouth daily   polyethylene glycol (MIRALAX) 17 g packet Not Taking  No No   Sig: Take 17 g by mouth every morning Do not start before May 13, 2023. Patient not taking: Reported on 7/1/2023   senna-docusate sodium (SENOKOT S) 8.6-50 mg per tablet Not Taking  No No   Sig: Take 2 tablets by mouth 2 (two) times a day   Patient not taking: Reported on 7/1/2023   tamsulosin (FLOMAX) 0.4 mg 6/30/2023  No Yes   Sig: Take 1 capsule (0.4 mg total) by mouth daily with dinner   ticagrelor (BRILINTA) 90 MG 7/1/2023  No Yes   Sig: Take 1 tablet (90 mg total) by mouth every 12 (twelve) hours   traZODone (DESYREL) 50 mg tablet 6/30/2023  No Yes   Sig: Take 1 tablet (50 mg total) by mouth daily at bedtime Do not start before June 6, 2023.       Facility-Administered Medications: None       Past Medical History:   Diagnosis Date   • Depression    • Diabetes mellitus ([address])     patient denies   • Dyslipidemia 03/26/2019   • Hyperlipidemia    • Hypertension    • Stroke [facility])        Past Surgical History:   Procedure Laterality Date   • BACK SURGERY     • IR CEREBRAL ANGIOGRAPHY  5/4/2023   • IR STROKE ALERT  3/19/2019   • SHOULDER SURGERY         Family History   Problem Relation Age of Onset   • Heart attack Mother    • Diabetes Mother    • Prostate cancer Father      I have reviewed and agree with the history as documented. E-Cigarette/Vaping   • E-Cigarette Use Former User      E-Cigarette/Vaping Substances   • Nicotine No    • THC No    • CBD No    • Flavoring No    • Other No    • Unknown No      Social History     Tobacco Use   • Smoking status: Former   • Smokeless tobacco: Never   Vaping Use   • Vaping Use: Former   Substance Use Topics   • Alcohol use: Never   • Drug use: Never       Review of Systems   Neurological: Positive for headaches. Physical Exam  Physical Exam  Vitals and nursing note reviewed. Constitutional:       General: He is not in acute distress. Appearance: He is well-developed. HENT:      Head: Normocephalic and atraumatic. Right Ear: External ear normal.      Left Ear: External ear normal.      Nose: Nose normal.   Eyes:      General: No scleral icterus. Pulmonary:      Effort: Pulmonary effort is normal. No respiratory distress. Abdominal:      General: There is no distension. Palpations: Abdomen is soft. Musculoskeletal:         General: No deformity. Normal range of motion. Cervical back: Normal range of motion and neck supple. Skin:     General: Skin is warm. Findings: No rash. Neurological:      General: No focal deficit present. Mental Status: He is alert.       Gait: Gait normal.   Psychiatric:         Mood and Affect: Mood normal.         Vital Signs  ED Triage Vitals [07/01/23 1317]   Temperature Pulse Respirations Blood Pressure SpO2   98.4 °F (36.9 °C) 60 18 154/67 98 %      Temp Source Heart Rate Source Patient Position - Orthostatic VS BP Location FiO2 (%)   Oral Monitor Sitting Left arm --      Pain Score       3           Vitals:    07/01/23 1415 07/01/23 1530 07/01/23 1626 07/1960   BP: (!) 172/70 167/71 (!) 182/74 157/77   Pulse: (!) 53 (!) 54 62 67   Patient Position - Orthostatic VS: Lying Lying           Visual Acuity  Visual Acuity    Flowsheet Row Most Recent Value   L Pupil Size (mm) 3   R Pupil Size (mm) 3   L Pupil Shape Round   R Pupil Shape Round          ED Medications  Medications   acetaminophen (TYLENOL) tablet 975 mg (has no administration in time range)   albuterol (PROVENTIL HFA,VENTOLIN HFA) inhaler 1 puff (has no administration in time range)   aspirin (ECOTRIN LOW STRENGTH) EC tablet 81 mg (has no administration in time range)   atorvastatin (LIPITOR) tablet 40 mg (has no administration in time range)   baclofen tablet 5 mg (has no administration in time range)   cilostazol (PLETAL) tablet 100 mg (100 mg Oral Given 7/1/23 1734)   citalopram (CeleXA) tablet 20 mg (has no administration in time range)   donepezil (ARICEPT) tablet 10 mg (10 mg Oral Given 7/1/23 1729)   heparin (porcine) subcutaneous injection 5,000 Units (has no administration in time range)   lidocaine (LIDODERM) 5 % patch 1 patch (has no administration in time range)   losartan (COZAAR) tablet 50 mg (has no administration in time range)   metoprolol succinate (TOPROL-XL) 24 hr tablet 100 mg (has no administration in time range)   pantoprazole (PROTONIX) EC tablet 40 mg (has no administration in time range)   polyethylene glycol (MIRALAX) packet 17 g (has no administration in time range)   senna-docusate sodium (SENOKOT S) 8.6-50 mg per tablet 2 tablet (2 tablets Oral Not Given 7/1/23 1729)   tamsulosin (FLOMAX) capsule 0.4 mg (0.4 mg Oral Given 7/1/23 1729)   ticagrelor (BRILINTA) tablet 90 mg (has no administration in time range)   traZODone (DESYREL) tablet 50 mg (has no administration in time range)   sodium chloride 0.9 % bolus 1,000 mL (1,000 mL Intravenous New Bag 7/1/23 1411)       Diagnostic Studies  Results Reviewed     Procedure Component Value Units Date/Time    Basic metabolic panel [[id number]] Collected: 07/01/23 1328    Lab Status: Final result Specimen: Blood from Arm, Right Updated: 07/01/23 1346     Sodium 142 mmol/L      Potassium 4.0 mmol/L      Chloride 108 mmol/L      CO2 27 mmol/L      ANION GAP 7 mmol/L      BUN 16 mg/dL      Creatinine 1.17 mg/dL      Glucose 95 mg/dL      Calcium 9.5 mg/dL      eGFR 65 ml/min/1.73sq m     Narrative:      [place] guidelines for Chronic Kidney Disease (CKD):   •  Stage 1 with normal or high GFR (GFR > 90 mL/min/1.73 square meters)  •  Stage 2 Mild CKD (GFR = 60-89 mL/min/1.73 square meters)  •  Stage 3A Moderate CKD (GFR = 45-59 mL/min/1.73 square meters)  •  Stage 3B Moderate CKD (GFR = 30-44 mL/min/1.73 square meters)  •  Stage 4 Severe CKD (GFR = 15-29 mL/min/1.73 square meters)  •  Stage 5 End Stage CKD (GFR <15 mL/min/1.73 square meters)  Note: GFR calculation is accurate only with a steady state creatinine    CBC and differential [[id number]]  (Abnormal) Collected: 07/01/23 1328    Lab Status: Final result Specimen: Blood from Arm, Right Updated: 07/01/23 1333     WBC 10.88 Thousand/uL      RBC 3.85 Million/uL      Hemoglobin 12.6 g/dL      Hematocrit 37.5 %      MCV 97 fL      MCH 32.7 pg      MCHC 33.6 g/dL      RDW 13.8 %      MPV 10.0 fL      Platelets 219 Thousands/uL      nRBC 0 /100 WBCs      Neutrophils Relative 59 %      Immat GRANS % 1 %      Lymphocytes Relative 25 %      Monocytes Relative 9 %      Eosinophils Relative 5 %      Basophils Relative 1 %      Neutrophils Absolute 6.39 Thousands/µL      Immature Grans Absolute 0.07 Thousand/uL      Lymphocytes Absolute 2.76 Thousands/µL      Monocytes Absolute 1.00 Thousand/µL      Eosinophils Absolute 0.58 Thousand/µL      Basophils Absolute 0.08 Thousands/µL                  CT head wo contrast   Final Result by Marlen Thompson MD (07/01 5798)      Evidence of maturing left occipital parietal infarct. There is increased encephalomalacia. No new hemorrhage. Ventriculomegaly is disproportionate and could represent normal pressure hydrocephalus.                   Workstation performed: [id number]         MRI Inpatient Order    (Results Pending)              Procedures  Procedures         ED Course                               SBIRT 22yo+    Flowsheet Row Most Recent Value   Initial Alcohol Screen: US AUDIT-C     1. How often do you have a drink containing alcohol? 0 Filed at: 07/01/2023 1319   2. How many drinks containing alcohol do you have on a typical day you are drinking? 0 Filed at: 07/01/2023 1319   3a. Male UNDER 65: How often do you have five or more drinks on one occasion? 0 Filed at: 07/01/2023 1319   3b. FEMALE Any Age, or MALE 65+: How often do you have 4 or more drinks on one occassion? 0 Filed at: 07/01/2023 1319   Audit-C Score 0 Filed at: 07/01/2023 1319   WILLIAM: How many times in the past year have you. .. Used an illegal drug or used a prescription medication for non-medical reasons? Never Filed at: 07/01/2023 1319                    Medical Decision Making  27-year-old male presenting for headache and being unable to care for self. Obtain CT head given recent CVA to assess for intracranial hemorrhage. Obtain labs. Symptom control as needed. Discussed case with Nelson. Patient will require admission for further evaluation. Amount and/or Complexity of Data Reviewed  Independent Historian: spouse  Labs: ordered. Radiology: ordered. Risk  Decision regarding hospitalization.           Disposition  Final diagnoses:   Headache     Time reflects when diagnosis was documented in both MDM as applicable and the Disposition within this note     Time User Action Codes Description Comment    7/1/2023  3:29 PM Nohemi Sanchez Add [R51.9] Headache     7/1/2023  4:27 PM Meche Sultana Add [R47.01] Aphasia     7/1/2023  4:56 PM Para Simple Add [I69.30] Chronic ischemic left MCA stroke     7/1/2023  4:56 PM Para Simple Add [M54.50,  G89.29] Chronic low back pain, unspecified back pain laterality, unspecified whether sciatica present     7/1/2023  7:48 PM Rod Fields Add [H53.8] Blurred vision       ED Disposition     ED Disposition   Admit    Condition   Stable    Date/Time   Sat Jul 1, 2023  3:29 PM    Comment   Case was discussed with NICHOLAS and the patient's admission status was agreed to be Admission Status: observation status to the service of Dr. Kaitlynn Aguilar . Follow-up Information    None         Current Discharge Medication List      CONTINUE these medications which have NOT CHANGED    Details   acetaminophen (TYLENOL) 325 mg tablet Take 3 tablets (975 mg total) by mouth 3 (three) times a day as needed for mild pain, headaches or fever  Refills: 0    Associated Diagnoses: Pain      aspirin (ECOTRIN LOW STRENGTH) 81 mg EC tablet Take 1 tablet (81 mg total) by mouth daily Do not start before April 19, 2023. Qty: 30 tablet, Refills: 0    Associated Diagnoses: Stroke ([address]); Hypertensive urgency; Altered mental status, unspecified altered mental status type      atorvastatin (LIPITOR) 40 mg tablet Take 40 mg by mouth daily with breakfast      baclofen 5 MG TABS Take 5 mg by mouth 2 (two) times a day as needed for muscle spasms  Refills: 0    Associated Diagnoses: Muscle spasms of both lower extremities      cilostazol (PLETAL) 100 mg tablet Take 1 tablet (100 mg total) by mouth 2 (two) times a day before meals Do not start before June 6, 2023.   Refills: 0    Associated Diagnoses: Recurrent strokes (HCC)      citalopram (CeleXA) 20 mg tablet Take 20 mg by mouth daily      donepezil (ARICEPT) 10 mg tablet Take 1 tablet (10 mg total) by mouth in the morning  Qty: 60 tablet, Refills: 2    Associated Diagnoses: Memory loss      lidocaine (Lidoderm) 5 % Apply 1 patch topically over 12 hours daily Remove & Discard patch within 12 hours or as directed by MD  Qty: 30 patch, Refills: 2    Associated Diagnoses: Acute midline low back pain without sciatica      losartan (COZAAR) 50 mg tablet Take 50 mg by mouth daily      metoprolol succinate (TOPROL-XL) 100 mg 24 hr tablet Take 100 mg by mouth daily !! pantoprazole (PROTONIX) 40 mg tablet Take 1 tablet (40 mg total) by mouth daily  Qty: 90 tablet, Refills: 2    Associated Diagnoses: GERD (gastroesophageal reflux disease)      tamsulosin (FLOMAX) 0.4 mg Take 1 capsule (0.4 mg total) by mouth daily with dinner  Refills: 0    Associated Diagnoses: Urinary retention      ticagrelor (BRILINTA) 90 MG Take 1 tablet (90 mg total) by mouth every 12 (twelve) hours  Refills: 0    Associated Diagnoses: Carotid stenosis, bilateral      traZODone (DESYREL) 50 mg tablet Take 1 tablet (50 mg total) by mouth daily at bedtime Do not start before June 6, 2023. Refills: 0    Associated Diagnoses: Insomnia      albuterol (PROVENTIL HFA,VENTOLIN HFA) 90 mcg/act inhaler       Heparin Sodium, Porcine, (heparin, porcine,) 5,000 units/mL Inject 1 mL (5,000 Units total) under the skin every 8 (eight) hours Stop at discretion of nursing facility provider but likely within a few weeks or sooner Do not start before June 6, 2023. Qty: 1 mL, Refills: 0    Associated Diagnoses: At risk for venous thromboembolism (VTE)      ! ! pantoprazole (PROTONIX) 20 mg tablet       polyethylene glycol (MIRALAX) 17 g packet Take 17 g by mouth every morning Do not start before May 13, 2023. Refills: 0    Associated Diagnoses: Constipation      senna-docusate sodium (SENOKOT S) 8.6-50 mg per tablet Take 2 tablets by mouth 2 (two) times a day  Refills: 0    Associated Diagnoses: Constipation       !! - Potential duplicate medications found. Please discuss with provider. No discharge procedures on file.     PDMP Review       Value Time User    PDMP Reviewed  Yes 6/5/2023  9:51 PM Ana María De La Torre MD          ED Provider  Electronically Signed by           Cristine Rodriguez DO  07/01/23 2008

## 2023-07-03 ENCOUNTER — APPOINTMENT (OUTPATIENT)
Dept: RADIOLOGY | Facility: HOSPITAL | Age: 63
DRG: 057 | End: 2023-07-03
Payer: COMMERCIAL

## 2023-07-03 ENCOUNTER — TELEPHONE (OUTPATIENT)
Dept: NEUROSURGERY | Facility: CLINIC | Age: 63
End: 2023-07-03

## 2023-07-03 LAB
ALBUMIN SERPL BCP-MCNC: 3.4 G/DL (ref 3.5–5)
ALP SERPL-CCNC: 72 U/L (ref 34–104)
ALT SERPL W P-5'-P-CCNC: 13 U/L (ref 7–52)
ANION GAP SERPL CALCULATED.3IONS-SCNC: 5 MMOL/L
AST SERPL W P-5'-P-CCNC: 12 U/L (ref 13–39)
BASOPHILS # BLD AUTO: 0.08 THOUSANDS/ÂΜL (ref 0–0.1)
BASOPHILS NFR BLD AUTO: 1 % (ref 0–1)
BILIRUB SERPL-MCNC: 0.57 MG/DL (ref 0.2–1)
BUN SERPL-MCNC: 18 MG/DL (ref 5–25)
CALCIUM ALBUM COR SERPL-MCNC: 9.4 MG/DL (ref 8.3–10.1)
CALCIUM SERPL-MCNC: 8.9 MG/DL (ref 8.4–10.2)
CHLORIDE SERPL-SCNC: 109 MMOL/L (ref 96–108)
CO2 SERPL-SCNC: 26 MMOL/L (ref 21–32)
CREAT SERPL-MCNC: 0.98 MG/DL (ref 0.6–1.3)
EOSINOPHIL # BLD AUTO: 0.55 THOUSAND/ÂΜL (ref 0–0.61)
EOSINOPHIL NFR BLD AUTO: 6 % (ref 0–6)
ERYTHROCYTE [DISTWIDTH] IN BLOOD BY AUTOMATED COUNT: 13.5 % (ref 11.6–15.1)
GFR SERPL CREATININE-BSD FRML MDRD: 81 ML/MIN/1.73SQ M
GLUCOSE P FAST SERPL-MCNC: 89 MG/DL (ref 65–99)
GLUCOSE SERPL-MCNC: 89 MG/DL (ref 65–140)
HCT VFR BLD AUTO: 35 % (ref 36.5–49.3)
HGB BLD-MCNC: 11.6 G/DL (ref 12–17)
IMM GRANULOCYTES # BLD AUTO: 0.03 THOUSAND/UL (ref 0–0.2)
IMM GRANULOCYTES NFR BLD AUTO: 0 % (ref 0–2)
LYMPHOCYTES # BLD AUTO: 2.63 THOUSANDS/ÂΜL (ref 0.6–4.47)
LYMPHOCYTES NFR BLD AUTO: 27 % (ref 14–44)
MCH RBC QN AUTO: 32.4 PG (ref 26.8–34.3)
MCHC RBC AUTO-ENTMCNC: 33.1 G/DL (ref 31.4–37.4)
MCV RBC AUTO: 98 FL (ref 82–98)
MONOCYTES # BLD AUTO: 0.92 THOUSAND/ÂΜL (ref 0.17–1.22)
MONOCYTES NFR BLD AUTO: 9 % (ref 4–12)
NEUTROPHILS # BLD AUTO: 5.53 THOUSANDS/ÂΜL (ref 1.85–7.62)
NEUTS SEG NFR BLD AUTO: 57 % (ref 43–75)
NRBC BLD AUTO-RTO: 0 /100 WBCS
PLATELET # BLD AUTO: 246 THOUSANDS/UL (ref 149–390)
PMV BLD AUTO: 10.2 FL (ref 8.9–12.7)
POTASSIUM SERPL-SCNC: 3.3 MMOL/L (ref 3.5–5.3)
PROT SERPL-MCNC: 5.6 G/DL (ref 6.4–8.4)
RBC # BLD AUTO: 3.58 MILLION/UL (ref 3.88–5.62)
SODIUM SERPL-SCNC: 140 MMOL/L (ref 135–147)
WBC # BLD AUTO: 9.74 THOUSAND/UL (ref 4.31–10.16)

## 2023-07-03 PROCEDURE — 80053 COMPREHEN METABOLIC PANEL: CPT | Performed by: HOSPITALIST

## 2023-07-03 PROCEDURE — 99232 SBSQ HOSP IP/OBS MODERATE 35: CPT | Performed by: HOSPITALIST

## 2023-07-03 PROCEDURE — 73030 X-RAY EXAM OF SHOULDER: CPT

## 2023-07-03 PROCEDURE — 85025 COMPLETE CBC W/AUTO DIFF WBC: CPT | Performed by: HOSPITALIST

## 2023-07-03 RX ORDER — POTASSIUM CHLORIDE 20 MEQ/1
40 TABLET, EXTENDED RELEASE ORAL ONCE
Status: COMPLETED | OUTPATIENT
Start: 2023-07-03 | End: 2023-07-03

## 2023-07-03 RX ADMIN — LIDOCAINE 5% 1 PATCH: 700 PATCH TOPICAL at 08:18

## 2023-07-03 RX ADMIN — TICAGRELOR 90 MG: 90 TABLET ORAL at 21:42

## 2023-07-03 RX ADMIN — TICAGRELOR 90 MG: 90 TABLET ORAL at 08:18

## 2023-07-03 RX ADMIN — ATORVASTATIN CALCIUM 40 MG: 40 TABLET, FILM COATED ORAL at 08:18

## 2023-07-03 RX ADMIN — POTASSIUM CHLORIDE 40 MEQ: 1500 TABLET, EXTENDED RELEASE ORAL at 08:18

## 2023-07-03 RX ADMIN — HEPARIN SODIUM 5000 UNITS: 5000 INJECTION INTRAVENOUS; SUBCUTANEOUS at 13:56

## 2023-07-03 RX ADMIN — DONEPEZIL HYDROCHLORIDE 10 MG: 5 TABLET ORAL at 21:42

## 2023-07-03 RX ADMIN — PANTOPRAZOLE SODIUM 40 MG: 40 TABLET, DELAYED RELEASE ORAL at 08:18

## 2023-07-03 RX ADMIN — TRAZODONE HYDROCHLORIDE 50 MG: 50 TABLET ORAL at 21:42

## 2023-07-03 RX ADMIN — CILOSTAZOL 100 MG: 50 TABLET ORAL at 05:24

## 2023-07-03 RX ADMIN — CILOSTAZOL 100 MG: 50 TABLET ORAL at 17:37

## 2023-07-03 RX ADMIN — HEPARIN SODIUM 5000 UNITS: 5000 INJECTION INTRAVENOUS; SUBCUTANEOUS at 21:42

## 2023-07-03 RX ADMIN — ASPIRIN 81 MG: 81 TABLET, COATED ORAL at 08:18

## 2023-07-03 RX ADMIN — HEPARIN SODIUM 5000 UNITS: 5000 INJECTION INTRAVENOUS; SUBCUTANEOUS at 05:24

## 2023-07-03 RX ADMIN — BACLOFEN 5 MG: 10 TABLET ORAL at 21:46

## 2023-07-03 RX ADMIN — BACLOFEN 5 MG: 10 TABLET ORAL at 08:18

## 2023-07-03 RX ADMIN — TAMSULOSIN HYDROCHLORIDE 0.4 MG: 0.4 CAPSULE ORAL at 17:37

## 2023-07-03 RX ADMIN — METOPROLOL SUCCINATE 100 MG: 50 TABLET, EXTENDED RELEASE ORAL at 08:17

## 2023-07-03 NOTE — PLAN OF CARE
Problem: Potential for Falls  Goal: Patient will remain free of falls  Description: INTERVENTIONS:  - Educate patient/family on patient safety including physical limitations  - Instruct patient to call for assistance with activity   - Consult OT/PT to assist with strengthening/mobility   - Keep Call bell within reach  - Keep bed low and locked with side rails adjusted as appropriate  - Keep care items and personal belongings within reach  - Initiate and maintain comfort rounds  - Make Fall Risk Sign visible to staff  - Offer Toileting every 2 Hours, in advance of need  - Initiate/Maintain bed/chair alarm  - Obtain necessary fall risk management equipment: alarm, socks, walker  - Apply yellow socks and bracelet for high fall risk patients  - Consider moving patient to room near nurses station  Outcome: Progressing     Problem: MOBILITY - ADULT  Goal: Maintain or return to baseline ADL function  Description: INTERVENTIONS:  -  Assess patient's ability to carry out ADLs; assess patient's baseline for ADL function and identify physical deficits which impact ability to perform ADLs (bathing, care of mouth/teeth, toileting, grooming, dressing, etc.)  - Assess/evaluate cause of self-care deficits   - Assess range of motion  - Assess patient's mobility; develop plan if impaired  - Assess patient's need for assistive devices and provide as appropriate  - Encourage maximum independence but intervene and supervise when necessary  - Involve family in performance of ADLs  - Assess for home care needs following discharge   - Consider OT consult to assist with ADL evaluation and planning for discharge  - Provide patient education as appropriate  Outcome: Progressing  Goal: Maintains/Returns to pre admission functional level  Description: INTERVENTIONS:  - Perform BMAT or MOVE assessment daily.   - Set and communicate daily mobility goal to care team and patient/family/caregiver.    - Collaborate with rehabilitation services on mobility goals if consulted  - Perform Range of Motion 3 times a day. - Reposition patient every 2 hours.   - Dangle patient 3 times a day  - Stand patient 3 times a day  - Ambulate patient 3 times a day  - Out of bed to chair 3 times a day   - Out of bed for meals 3 times a day  - Out of bed for toileting  - Record patient progress and toleration of activity level   Outcome: Progressing     Problem: PAIN - ADULT  Goal: Verbalizes/displays adequate comfort level or baseline comfort level  Description: Interventions:  - Encourage patient to monitor pain and request assistance  - Assess pain using appropriate pain scale  - Administer analgesics based on type and severity of pain and evaluate response  - Implement non-pharmacological measures as appropriate and evaluate response  - Consider cultural and social influences on pain and pain management  - Notify physician/advanced practitioner if interventions unsuccessful or patient reports new pain  Outcome: Progressing     Problem: INFECTION - ADULT  Goal: Absence or prevention of progression during hospitalization  Description: INTERVENTIONS:  - Assess and monitor for signs and symptoms of infection  - Monitor lab/diagnostic results  - Monitor all insertion sites, i.e. indwelling lines, tubes, and drains  - Administer medications as ordered  - Instruct and encourage patient and family to use good hand hygiene technique  - Identify and instruct in appropriate isolation precautions for identified infection/condition  Outcome: Progressing    Problem: DISCHARGE PLANNING  Goal: Discharge to home or other facility with appropriate resources  Description: INTERVENTIONS:  - Identify barriers to discharge w/patient and caregiver  - Arrange for needed discharge resources and transportation as appropriate  - Identify discharge learning needs (meds, wound care, etc.)  - Arrange for interpretive services to assist at discharge as needed  - Refer to Case Management Department for coordinating discharge planning if the patient needs post-hospital services based on physician/advanced practitioner order or complex needs related to functional status, cognitive ability, or social support system  Outcome: Progressing     Problem: Knowledge Deficit  Goal: Patient/family/caregiver demonstrates understanding of disease process, treatment plan, medications, and discharge instructions  Description: Complete learning assessment and assess knowledge base.   Interventions:  - Provide teaching at level of understanding  - Provide teaching via preferred learning methods  Outcome: Progressing     Problem: Prexisting or High Potential for Compromised Skin Integrity  Goal: Skin integrity is maintained or improved  Description: INTERVENTIONS:  - Identify patients at risk for skin breakdown  - Assess and monitor skin integrity  - Assess and monitor nutrition and hydration status  - Monitor labs   - Assess for incontinence   - Turn and reposition patient  - Assist with mobility/ambulation  - Relieve pressure over bony prominences  - Avoid friction and shearing  - Provide appropriate hygiene as needed including keeping skin clean and dry  - Evaluate need for skin moisturizer/barrier cream  - Collaborate with interdisciplinary team   - Patient/family teaching  - Consider wound care consult   Outcome: Progressing     Problem: NEUROSENSORY - ADULT  Goal: Achieves stable or improved neurological status  Description: INTERVENTIONS  - Monitor and report changes in neurological status  - Monitor vital signs such as temperature, blood pressure, glucose, and any other labs ordered   - Initiate measures to prevent increased intracranial pressure  - Monitor for seizure activity and implement precautions if appropriate      Outcome: Progressing

## 2023-07-03 NOTE — TELEPHONE ENCOUNTER
7/3/23 disc received from [facility] and facesheet gave to [address] to upload

## 2023-07-03 NOTE — PLAN OF CARE
Problem: Potential for Falls  Goal: Patient will remain free of falls  Description: INTERVENTIONS:  - Educate patient/family on patient safety including physical limitations  - Instruct patient to call for assistance with activity   - Consult OT/PT to assist with strengthening/mobility   - Keep Call bell within reach  - Keep bed low and locked with side rails adjusted as appropriate  - Keep care items and personal belongings within reach  - Initiate and maintain comfort rounds  - Make Fall Risk Sign visible to staff  - Offer Toileting every 2 Hours, in advance of need  - Initiate/Maintain bed alarm  - Obtain necessary fall risk management equipment: alarm, socks  - Apply yellow socks and bracelet for high fall risk patients  - Consider moving patient to room near nurses station  Outcome: Progressing     Problem: MOBILITY - ADULT  Goal: Maintain or return to baseline ADL function  Description: INTERVENTIONS:  -  Assess patient's ability to carry out ADLs; assess patient's baseline for ADL function and identify physical deficits which impact ability to perform ADLs (bathing, care of mouth/teeth, toileting, grooming, dressing, etc.)  - Assess/evaluate cause of self-care deficits   - Assess range of motion  - Assess patient's mobility; develop plan if impaired  - Assess patient's need for assistive devices and provide as appropriate  - Encourage maximum independence but intervene and supervise when necessary  - Involve family in performance of ADLs  - Assess for home care needs following discharge   - Consider OT consult to assist with ADL evaluation and planning for discharge  - Provide patient education as appropriate  Outcome: Progressing  Goal: Maintains/Returns to pre admission functional level  Description: INTERVENTIONS:  - Perform BMAT or MOVE assessment daily.   - Set and communicate daily mobility goal to care team and patient/family/caregiver.    - Collaborate with rehabilitation services on mobility goals if consulted  - Perform Range of Motion 3 times a day. - Reposition patient every 2 hours.   - Dangle patient 3 times a day  - Stand patient 3 times a day  - Ambulate patient 3 times a day  - Out of bed to chair 3 times a day   - Out of bed for meals 3 times a day  - Out of bed for toileting  - Record patient progress and toleration of activity level   Outcome: Progressing     Problem: PAIN - ADULT  Goal: Verbalizes/displays adequate comfort level or baseline comfort level  Description: Interventions:  - Encourage patient to monitor pain and request assistance  - Assess pain using appropriate pain scale  - Administer analgesics based on type and severity of pain and evaluate response  - Implement non-pharmacological measures as appropriate and evaluate response  - Consider cultural and social influences on pain and pain management  - Notify physician/advanced practitioner if interventions unsuccessful or patient reports new pain  Outcome: Progressing     Problem: INFECTION - ADULT  Goal: Absence or prevention of progression during hospitalization  Description: INTERVENTIONS:  - Assess and monitor for signs and symptoms of infection  - Monitor lab/diagnostic results  - Monitor all insertion sites, i.e. indwelling lines, tubes, and drains  - Administer medications as ordered  - Instruct and encourage patient and family to use good hand hygiene technique  - Identify and instruct in appropriate isolation precautions for identified infection/condition  Outcome: Progressing     Problem: SAFETY ADULT  Goal: Patient will remain free of falls  Description: INTERVENTIONS:  - Educate patient/family on patient safety including physical limitations  - Instruct patient to call for assistance with activity   - Consult OT/PT to assist with strengthening/mobility   - Keep Call bell within reach  - Keep bed low and locked with side rails adjusted as appropriate  - Keep care items and personal belongings within reach  - Initiate and maintain comfort rounds  - Make Fall Risk Sign visible to staff  - Offer Toileting every 2 Hours, in advance of need  - Initiate/Maintain bed alarm  - Obtain necessary fall risk management equipment: alarm, socks  - Apply yellow socks and bracelet for high fall risk patients  - Consider moving patient to room near nurses station  Outcome: Progressing  Goal: Maintain or return to baseline ADL function  Description: INTERVENTIONS:  -  Assess patient's ability to carry out ADLs; assess patient's baseline for ADL function and identify physical deficits which impact ability to perform ADLs (bathing, care of mouth/teeth, toileting, grooming, dressing, etc.)  - Assess/evaluate cause of self-care deficits   - Assess range of motion  - Assess patient's mobility; develop plan if impaired  - Assess patient's need for assistive devices and provide as appropriate  - Encourage maximum independence but intervene and supervise when necessary  - Involve family in performance of ADLs  - Assess for home care needs following discharge   - Consider OT consult to assist with ADL evaluation and planning for discharge  - Provide patient education as appropriate  Outcome: Progressing  Goal: Maintains/Returns to pre admission functional level  Description: INTERVENTIONS:  - Perform BMAT or MOVE assessment daily.   - Set and communicate daily mobility goal to care team and patient/family/caregiver. - Collaborate with rehabilitation services on mobility goals if consulted  - Perform Range of Motion 3 times a day. - Reposition patient every 2 hours.   - Dangle patient 3 times a day  - Stand patient 3 times a day  - Ambulate patient 3 times a day  - Out of bed to chair 3 times a day   - Out of bed for meals 3 times a day  - Out of bed for toileting  - Record patient progress and toleration of activity level   Outcome: Progressing     Problem: DISCHARGE PLANNING  Goal: Discharge to home or other facility with appropriate resources  Description: INTERVENTIONS:  - Identify barriers to discharge w/patient and caregiver  - Arrange for needed discharge resources and transportation as appropriate  - Identify discharge learning needs (meds, wound care, etc.)  - Arrange for interpretive services to assist at discharge as needed  - Refer to Case Management Department for coordinating discharge planning if the patient needs post-hospital services based on physician/advanced practitioner order or complex needs related to functional status, cognitive ability, or social support system  Outcome: Progressing     Problem: Knowledge Deficit  Goal: Patient/family/caregiver demonstrates understanding of disease process, treatment plan, medications, and discharge instructions  Description: Complete learning assessment and assess knowledge base.   Interventions:  - Provide teaching at level of understanding  - Provide teaching via preferred learning methods  Outcome: Progressing     Problem: Prexisting or High Potential for Compromised Skin Integrity  Goal: Skin integrity is maintained or improved  Description: INTERVENTIONS:  - Identify patients at risk for skin breakdown  - Assess and monitor skin integrity  - Assess and monitor nutrition and hydration status  - Monitor labs   - Assess for incontinence   - Turn and reposition patient  - Assist with mobility/ambulation  - Relieve pressure over bony prominences  - Avoid friction and shearing  - Provide appropriate hygiene as needed including keeping skin clean and dry  - Evaluate need for skin moisturizer/barrier cream  - Collaborate with interdisciplinary team   - Patient/family teaching  - Consider wound care consult   Outcome: Progressing     Problem: NEUROSENSORY - ADULT  Goal: Achieves stable or improved neurological status  Description: INTERVENTIONS  - Monitor and report changes in neurological status  - Monitor vital signs such as temperature, blood pressure, glucose, and any other labs ordered   - Initiate measures to prevent increased intracranial pressure  - Monitor for seizure activity and implement precautions if appropriate      Outcome: Progressing

## 2023-07-03 NOTE — ASSESSMENT & PLAN NOTE
History of multiple stroke  Hospitalized May for multifocal ischemic infarcts from distributions with bilateral carotid stenosis  -Underwent left ICA PTA with stenting on May 4  -Originally it was on Eliquis and aspirin and after recent stroke he was transitioned to aspirin and Brilinta  -Also on Pletal for previous diagnosis of peripheral vascular disease  -During recent hospitalization Eliquis was discontinued without plan to be resumed as neurosurgery who evaluated also the patient together with the neurology felt that strokes are less likely embolic in nature  -Vasculitis work-up was negative  -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 for which outpatient follow-up with neurosurgery was suggested  -He continues on Lipitor 40 mg/day  -He has aphasia and memory loss with ambulatory dysfunction and reported hemiplegia on the nondominant side as effect of the stroke  -Loop recorder replaced in 2019 for previous stroke and reported as negative however, there is a plan to insert the new loop recorder for the patient in the near future with cardiology  -He has completed [facility] rehabilitation and was discharged home in the first week of June  -Since being home as per ER spoke with wife the patient has been unable to perform ADL and has experienced more difficulties with walking  -Today he had a fall and was brought to the emergency department as per advice of visiting nurse who felt that the patient will require higher level of care that wife can no longer provide  -CT head in the emergency department negative for acute finding, evolving left parietal stroke  -He appears to be at baseline with some aphasia/dysarthria some memory impairment  -Wife reports that the patient is impulsive at home, not witnessed in the emergency department however  -Resume all chronic medications  -Strict fall precautions  -Strict aspiration precaution  -PT/OT and  evaluation possible placement at least short-term  -Pt family reported change in vision, more lethargy, issues with balance PTA. Apprec neuro inpt. MRI brain d/w neuro - no new stroke, felt stable. Fortunately he appears improved today per his report. Will check EEG.

## 2023-07-03 NOTE — OCCUPATIONAL THERAPY NOTE
Occupational Therapy Cx Note     Patient Name: Russ Presbenjamin  VYZGU'Y Date: 7/3/2023  Problem List  Principal Problem:    Stroke [facility])  Active Problems:    Fall    Type 2 diabetes mellitus ([address])    Memory deficits    Possible NPH (normal pressure hydrocephalus) ([address])            07/03/23 1539   OT Last Visit   OT Visit Date 07/03/23   Note Type   Note type Cancelled Session   Additional Comments OT orders received. Chart reviewed. Pending b/l UE xrays. Will hold and follow when resulted. Pt with hx of fall.            Jasbir Martin, OTR/L

## 2023-07-03 NOTE — PHYSICAL THERAPY NOTE
Physical Therapy Cancellation Note         07/03/23 9290   Note Type   Note type Cancelled Session   Additional Comments PT orders received. Chart reviewed. Pending b/l UE xrays. Will hold and follow when resulted. Pt with hx of fall.        Cliff Martinez,DPT

## 2023-07-03 NOTE — CASE MANAGEMENT
Case Management Assessment & Discharge Planning Note    Patient name Robinhood Madie  Location /-53 MRN [id number]  : 1960 Date 7/3/2023       Current Admission Date: 2023  Current Admission Diagnosis:Stroke [facility])   Patient Active Problem List    Diagnosis Date Noted   • Aphasia 2023   • Atherosclerosis of native arteries of extremities with intermittent claudication, bilateral legs ([address]) 2023   • Benign prostatic hyperplasia with lower urinary tract symptoms 2023   • Possible NPH (normal pressure hydrocephalus) ([address]) 2023   • Muscle spasms of both lower extremities 2023   • Multiple thyroid nodules 2023   • Abnormal laboratory test 05/15/2023   • History of tobacco use 2023   • Chronic ischemic left MCA stroke 2023   • Hypokalemia 2023   • Infrarenal abdominal aortic aneurysm (AAA) without rupture ([address]) 2023   • Tachycardia 2023   • Prediabetes 2023   • SIRS (systemic inflammatory response syndrome) ([address]) 2023   • Chronic anticoagulation 2023   • Stroke ([address]) 2023   • Hyponatremia 2023   • Middle cerebral artery stenosis, right 2023   • Left posterior MCA stroke - etiology unclear at this time 2023   • Chronic low back pain 2023   • Hypertensive encephalopathy, transient 2023   • Snoring 08/10/2020   • Memory deficits 08/10/2020   • Chronic ischemic right MCA stroke 2019   • Status post placement of implantable loop recorder 2019   • Type 2 diabetes mellitus ([address]) 2019   • Anxiety and depression 2019   • Insomnia 2019   • Fall 2019   • 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 ([address]) 2019   • Urinary retention 2019   • At risk for venous thromboembolism (VTE) 2019   • Hypertriglyceridemia 2019   • Nicotine dependence 2019   • Bilateral carotid artery stenosis 2019   • Presbyopia 2019 • History of stroke 03/19/2019   • Headache 03/19/2019   • Primary hypertension 03/19/2019   • GERD (gastroesophageal reflux disease) 03/19/2019      LOS (days): 0  Geometric Mean LOS (GMLOS) (days):   Days to GMLOS:     OBJECTIVE:          Current admission status: Observation    Preferred Pharmacy:   [facility] [address] VALDEZ AYALA [address], [address]  [address] [facility] [address]  Phone: [phone] Fax: [address] [facility], [address]  [address] [place] [address]  Phone: [phone] Fax: [phone]    Primary Care Provider: Clide Bosworth, CRNP    Primary Insurance: BLUE CROSS  Secondary Insurance: [facility] REP    ASSESSMENT:  [id number], [address] Representative - Spouse   Primary Phone: [phone] [facility])  Home Phone: [phone]               Advance Directives  Does patient have a [address]?: Yes  Does patient have Advance Directives?: Yes  Advance Directives: Living will, Power of  for health care  Primary Contact: Scarlett George: wife: [phone], [phone]    Readmission Root Cause  30 Day Readmission: No    Patient Information  Admitted from[de-identified] Home  Mental Status: Alert  During Assessment patient was accompanied by: Not accompanied during assessment  Assessment information provided by[de-identified] Patient  Primary Caregiver: Self  Support Systems: Spouse/significant other, Children, Family members  [facility]: [address] [place] [address] do you live in?: [facility] entry access options. Select all that apply.: Stairs  Number of steps to enter home. : 1  Do the steps have railings?: Yes  Type of Current Residence: [place]  In the last 12 months, was there a time when you were not able to pay the mortgage or rent on time?: No  In the last 12 months, how many places have you lived?: 1  In the last 12 months, was there a time when you did not have a steady place to sleep or slept in a shelter (including now)?: No  Homeless/housing insecurity resource given?: N/A  Living Arrangements: Lives w/ Spouse/significant other, Lives w/ Daughter, Lives w/ Son  Is patient a ?: No    Activities of Daily Living Prior to Admission  Functional Status: Independent  Completes ADLs independently?: Yes  Ambulates independently?: Yes (uses cane and walker)  Does patient use assisted devices?: Yes  Assisted Devices (DME) used: Straight Yuliana Ortiz  Does patient currently own DME?: Yes  What DME does the patient currently own?: Wheelchair, Wanda Burows, Bedside Commode, Shower Chair  Does patient have a history of Outpatient Therapy (PT/OT)?: No  Does the patient have a history of Short-Term Rehab?: Yes  Does patient have a history of HHC?: Yes (unable to recall)  Does patient currently have [address]?: No         Patient Information Continued  Income Source: Employed  Does patient have prescription coverage?: Yes  Within the past 12 months, you worried that your food would run out before you got the money to buy more.: Never true  Within the past 12 months, the food you bought just didn't last and you didn't have money to get more.: Never true  Food insecurity resource given?: N/A  Does patient receive dialysis treatments?: No  Does patient have a history of substance abuse?: No  Does patient have a history of Mental Health Diagnosis?: Yes (anxiety amd depression)         Means of Transportation  Means of Transport to Appts[de-identified] Family transport  In the past 12 months, has lack of transportation kept you from medical appointments or from getting medications?: No  In the past 12 months, has lack of transportation kept you from meetings, work, or from getting things needed for daily living?: No  Was application for public transport provided?: N/A     DISCHARGE DETAILS:    Discharge planning discussed with[de-identified] patient        CM contacted family/caregiver?: Yes (left message for Pt's wife: Quyen Matt)    Contacts  Patient Contacts: Constanza Larson  Relationship to Patient[de-identified] Family  Contact Method: Phone  Phone Number: [phone]  Reason/Outcome: Discharge Planning, Emergency Contact    Additional Comments: Met with Pt. Pt presents AA&Ox3. Discussed role of case management. Pt lives with wife, son, dtr in 4sh, 1 chiara. Uses walker and cane with ambulation. Has shower chair and commode. Reports hx of VNA but unable to recall agency. Reports he [facility] at [facility] in past. Pt has hx of SNF but unable to recall agency. Pt gave permission for CM to call his wife for discharge planning. Call placed to Pt's wife(Faiza: [phone]), left message. Anticipate return call. CM to follow.

## 2023-07-03 NOTE — PROGRESS NOTES
[facility]  Progress Note  Name: Andry Parham  MRN: [id number]  Unit/Bed#: -01 I Date of Admission: 7/1/2023   Date of Service: 7/3/2023 I Hospital Day: 0    Assessment/Plan   Possible NPH (normal pressure hydrocephalus) ([address])  Assessment & Plan  He has established follow-up with neurosurgery, no need for further intervention while in the hospital  No Need for urgent lumbar puncture    Memory deficits  Assessment & Plan  Positive care and Namenda, monitor for sundowning, strict fall precautions    Type 2 diabetes mellitus ([address])  Assessment & Plan  Lab Results   Component Value Date    HGBA1C 5.3 07/02/2023       Recent Labs     07/01/23  1627   POCGLU 79       Blood Sugar Average: Last 72 hrs:  (P) 79     A1c 5.8  No concerns at this time  Not on any agent    [address]  Please refer to principal    * Stroke [facility])  Assessment & Plan  History of multiple stroke  Hospitalized May for multifocal ischemic infarcts from distributions with bilateral carotid stenosis  -Underwent left ICA PTA with stenting on May 4  -Originally it was on Eliquis and aspirin and after recent stroke he was transitioned to aspirin and Brilinta  -Also on Pletal for previous diagnosis of peripheral vascular disease  -During recent hospitalization Eliquis was discontinued without plan to be resumed as neurosurgery who evaluated also the patient together with the neurology felt that strokes are less likely embolic in nature  -Vasculitis work-up was negative  -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 for which outpatient follow-up with neurosurgery was suggested  -He continues on Lipitor 40 mg/day  -He has aphasia and memory loss with ambulatory dysfunction and reported hemiplegia on the nondominant side as effect of the stroke  -Loop recorder replaced in 2019 for previous stroke and reported as negative however, there is a plan to insert the new loop recorder for the patient in the near future with cardiology  -He has completed ARC rehabilitation and was discharged home in the first week of   -Since being home as per ER spoke with wife the patient has been unable to perform ADL and has experienced more difficulties with walking  -Today he had a fall and was brought to the emergency department as per advice of visiting nurse who felt that the patient will require higher level of care that wife can no longer provide  -CT head in the emergency department negative for acute finding, evolving left parietal stroke  -He appears to be at baseline with some aphasia/dysarthria some memory impairment  -Wife reports that the patient is impulsive at home, not witnessed in the emergency department however  -Resume all chronic medications  -Strict fall precautions  -Strict aspiration precaution  -PT/OT and  evaluation possible placement at least short-term  -Pt family reported change in vision, more lethargy, issues with balance PTA. Apprec neuro inpt. MRI brain d/w neuro - no new stroke, felt stable. Fortunately he appears improved today per his report. Will check EEG. VTE  Prophylaxis:   Pharmacologic: in place  Mechanical VTE Prophylaxis in Place: Yes    Patient Centered Rounds: I have performed bedside rounds with nursing staff today. Discussions with Specialists or Other Care Team Provider: case management    Education and Discussions with Family / Patient: pt      Current Length of Stay: 0 day(s)    Current Patient Status: Inpatient        Code Status: Level 1 - Full Code    Discharge Plan: Pt will require continued inpatient hospitalization. Subjective:     Pt without new complaints states appproaching baseline. Patient is seen and examined at bedside. All other ROS are negative.     Objective:     Vitals:   Temp (24hrs), Av.9 °F (36.6 °C), Min:97.7 °F (36.5 °C), Max:98.2 °F (36.8 °C)    Temp:  [97.7 °F (36.5 °C)-98.2 °F (36.8 °C)] 97.7 °F (36.5 °C)  HR:  [62-70] 63  Resp:  [15-17] 17  BP: (141-154)/(67-76) 141/67  SpO2:  [93 %-96 %] 94 %  Body mass index is 31.05 kg/m². Input and Output Summary (last 24 hours):     No intake or output data in the 24 hours ending 07/03/23 1352    Physical Exam:       GEN: No acute distress, comfortable  HEEENT: No JVD, PERRLA, no scleral icterus  RESP: Lungs clear to auscultation bilaterally  CV: RRR, +s1/s2   ABD: SOFT NON TENDER, POSITIVE BOWEL SOUNDS, NO DISTENTION  PSYCH: CALM  NEURO: Mentation baseline, NO FOCAL DEFICITS  SKIN: NO RASH  EXTREM: NO EDEMA    Additional Data:     Labs:    Results from last 7 days   Lab Units 07/03/23  0352   WBC Thousand/uL 9.74   HEMOGLOBIN g/dL 11.6*   HEMATOCRIT % 35.0*   PLATELETS Thousands/uL 246   NEUTROS PCT % 57   LYMPHS PCT % 27   MONOS PCT % 9   EOS PCT % 6     Results from last 7 days   Lab Units 07/03/23  0352   SODIUM mmol/L 140   POTASSIUM mmol/L 3.3*   CHLORIDE mmol/L 109*   CO2 mmol/L 26   BUN mg/dL 18   CREATININE mg/dL 0.98   ANION GAP mmol/L 5   CALCIUM mg/dL 8.9   ALBUMIN g/dL 3.4*   TOTAL BILIRUBIN mg/dL 0.57   ALK PHOS U/L 72   ALT U/L 13   AST U/L 12*   GLUCOSE RANDOM mg/dL 89         Results from last 7 days   Lab Units 07/01/23  1627   POC GLUCOSE mg/dl 79     Results from last 7 days   Lab Units 07/02/23  0330   HEMOGLOBIN A1C % 5.3           Lines/Drains:  Invasive Devices     Peripheral Intravenous Line  Duration           Peripheral IV 07/01/23 Right Antecubital 2 days                Telemetry:   Telemetry Orders (From admission, onward)             24 Hour Telemetry Monitoring  Continuous x 24 Hours (Telem)        Expiring   Question:  Reason for 24 Hour Telemetry  Answer:  TIA/Suspected CVA/ Confirmed CVA                    * I Have Reviewed All Lab Data Listed Above. Imaging:     Results for orders placed during the hospital encounter of 04/16/23    XR chest 1 view portable    Narrative  CHEST    INDICATION:   Altered mental status.     COMPARISON:  Chest radiograph 1/12/2023. EXAM PERFORMED/VIEWS:  XR CHEST PORTABLE  AP semierect    FINDINGS:    Left hemithorax loop recorder. Cardiomediastinal silhouette appears unremarkable. The lungs are clear. No pneumothorax or pleural effusion. Osseous structures appear within normal limits for patient age. Impression  No acute cardiopulmonary disease. Workstation performed: [id number]    Results for orders placed during the hospital encounter of 04/26/23    XR chest pa & lateral    Narrative  CHEST    INDICATION:   RLL crackles r/o pneumonia. COMPARISON: 4/16/2023    EXAM PERFORMED/VIEWS:  XR CHEST PA & LATERAL      FINDINGS: Loop recorder is identified. Cardiomediastinal silhouette appears unremarkable. The lungs are clear. No pneumothorax or pleural effusion. Osseous structures appear within normal limits for patient age. Impression  No acute cardiopulmonary disease.             Workstation performed: [id number]      *I have reviewed all imaging reports listed above      Recent Cultures (last 7 days):           Last 24 Hours Medication List:   Current Facility-Administered Medications   Medication Dose Route Frequency Provider Last Rate   • acetaminophen  975 mg Oral TID PRN Katie Spicer MD     • albuterol  1 puff Inhalation Q4H PRN Katie Spicer MD     • aspirin  81 mg Oral Daily Katie Spicer MD     • atorvastatin  40 mg Oral Daily With Breakfast Katie Spicer MD     • baclofen  5 mg Oral Q12H Yvonne Gates PA-C     • cilostazol  100 mg Oral BID AC Katie Spicer MD     • donepezil  10 mg Oral HS Yvonne Gates PA-C     • heparin (porcine)  5,000 Units Subcutaneous Q8H [facility] Katie Spicer MD     • hydrALAZINE  5 mg Intravenous Q6H PRN Ozzy Garcia MD     • lidocaine  1 patch Topical Daily Yvonne Gates PA-C     • metoprolol succinate  100 mg Oral Daily Katie Spicer MD     • pantoprazole  40 mg Oral Daily Carmen MD Elizabeth     • polyethylene glycol  17 g Oral QAM Michael Becerril MD     • senna-docusate sodium  2 tablet Oral BID Michael Becerril MD     • tamsulosin  0.4 mg Oral Daily With Neymar Porras MD     • ticagrelor  90 mg Oral Q12H [address] Michael Becerril MD     • traZODone  50 mg Oral HS Michael Becerril MD          Today, Patient Was Seen By: Beverly Muller MD    ** Please Note: Dictation voice to text software may have been used in the creation of this document.  **

## 2023-07-04 PROCEDURE — 99232 SBSQ HOSP IP/OBS MODERATE 35: CPT | Performed by: HOSPITALIST

## 2023-07-04 RX ORDER — LANOLIN ALCOHOL/MO/W.PET/CERES
400 CREAM (GRAM) TOPICAL 2 TIMES DAILY
Status: COMPLETED | OUTPATIENT
Start: 2023-07-04 | End: 2023-07-04

## 2023-07-04 RX ORDER — POTASSIUM CHLORIDE 20 MEQ/1
40 TABLET, EXTENDED RELEASE ORAL ONCE
Status: COMPLETED | OUTPATIENT
Start: 2023-07-04 | End: 2023-07-04

## 2023-07-04 RX ADMIN — TICAGRELOR 90 MG: 90 TABLET ORAL at 09:18

## 2023-07-04 RX ADMIN — BACLOFEN 5 MG: 10 TABLET ORAL at 09:18

## 2023-07-04 RX ADMIN — MAGNESIUM OXIDE TAB 400 MG (241.3 MG ELEMENTAL MG) 400 MG: 400 (241.3 MG) TAB at 17:04

## 2023-07-04 RX ADMIN — CILOSTAZOL 100 MG: 50 TABLET ORAL at 05:24

## 2023-07-04 RX ADMIN — CILOSTAZOL 100 MG: 50 TABLET ORAL at 17:04

## 2023-07-04 RX ADMIN — TRAZODONE HYDROCHLORIDE 50 MG: 50 TABLET ORAL at 21:08

## 2023-07-04 RX ADMIN — TAMSULOSIN HYDROCHLORIDE 0.4 MG: 0.4 CAPSULE ORAL at 17:04

## 2023-07-04 RX ADMIN — HEPARIN SODIUM 5000 UNITS: 5000 INJECTION INTRAVENOUS; SUBCUTANEOUS at 21:08

## 2023-07-04 RX ADMIN — LIDOCAINE 5% 1 PATCH: 700 PATCH TOPICAL at 09:17

## 2023-07-04 RX ADMIN — ATORVASTATIN CALCIUM 40 MG: 40 TABLET, FILM COATED ORAL at 09:18

## 2023-07-04 RX ADMIN — HEPARIN SODIUM 5000 UNITS: 5000 INJECTION INTRAVENOUS; SUBCUTANEOUS at 05:24

## 2023-07-04 RX ADMIN — HEPARIN SODIUM 5000 UNITS: 5000 INJECTION INTRAVENOUS; SUBCUTANEOUS at 14:19

## 2023-07-04 RX ADMIN — ASPIRIN 81 MG: 81 TABLET, COATED ORAL at 09:18

## 2023-07-04 RX ADMIN — POTASSIUM CHLORIDE 40 MEQ: 1500 TABLET, EXTENDED RELEASE ORAL at 09:18

## 2023-07-04 RX ADMIN — METOPROLOL SUCCINATE 100 MG: 50 TABLET, EXTENDED RELEASE ORAL at 09:18

## 2023-07-04 RX ADMIN — DONEPEZIL HYDROCHLORIDE 10 MG: 5 TABLET ORAL at 21:07

## 2023-07-04 RX ADMIN — MAGNESIUM OXIDE TAB 400 MG (241.3 MG ELEMENTAL MG) 400 MG: 400 (241.3 MG) TAB at 09:18

## 2023-07-04 RX ADMIN — TICAGRELOR 90 MG: 90 TABLET ORAL at 19:41

## 2023-07-04 RX ADMIN — PANTOPRAZOLE SODIUM 40 MG: 40 TABLET, DELAYED RELEASE ORAL at 09:18

## 2023-07-04 RX ADMIN — BACLOFEN 5 MG: 10 TABLET ORAL at 19:41

## 2023-07-04 NOTE — PLAN OF CARE
Problem: Potential for Falls  Goal: Patient will remain free of falls  Description: INTERVENTIONS:  - Educate patient/family on patient safety including physical limitations  - Instruct patient to call for assistance with activity   - Consult OT/PT to assist with strengthening/mobility   - Keep Call bell within reach  - Keep bed low and locked with side rails adjusted as appropriate  - Keep care items and personal belongings within reach  - Initiate and maintain comfort rounds  - Make Fall Risk Sign visible to staff  - Offer Toileting every 2 Hours, in advance of need  - Initiate/Maintain bed/chair alarm  - Obtain necessary fall risk management equipment: alarm, non-slip socks, walker  - Apply yellow socks and bracelet for high fall risk patients  - Consider moving patient to room near nurses station  Outcome: Progressing     Problem: MOBILITY - ADULT  Goal: Maintain or return to baseline ADL function  Description: INTERVENTIONS:  -  Assess patient's ability to carry out ADLs; assess patient's baseline for ADL function and identify physical deficits which impact ability to perform ADLs (bathing, care of mouth/teeth, toileting, grooming, dressing, etc.)  - Assess/evaluate cause of self-care deficits   - Assess range of motion  - Assess patient's mobility; develop plan if impaired  - Assess patient's need for assistive devices and provide as appropriate  - Encourage maximum independence but intervene and supervise when necessary  - Involve family in performance of ADLs  - Assess for home care needs following discharge   - Consider OT consult to assist with ADL evaluation and planning for discharge  - Provide patient education as appropriate  Outcome: Progressing  Goal: Maintains/Returns to pre admission functional level  Description: INTERVENTIONS:  - Perform BMAT or MOVE assessment daily.   - Set and communicate daily mobility goal to care team and patient/family/caregiver.    - Collaborate with rehabilitation services on mobility goals if consulted  - Perform Range of Motion 3 times a day. - Reposition patient every 2 hours.   - Dangle patient 3 times a day  - Stand patient 3 times a day  - Ambulate patient 3 times a day  - Out of bed to chair 3 times a day   - Out of bed for meals 3 times a day  - Out of bed for toileting  - Record patient progress and toleration of activity level   Outcome: Progressing     Problem: PAIN - ADULT  Goal: Verbalizes/displays adequate comfort level or baseline comfort level  Description: Interventions:  - Encourage patient to monitor pain and request assistance  - Assess pain using appropriate pain scale  - Administer analgesics based on type and severity of pain and evaluate response  - Implement non-pharmacological measures as appropriate and evaluate response  - Consider cultural and social influences on pain and pain management  - Notify physician/advanced practitioner if interventions unsuccessful or patient reports new pain  Outcome: Progressing     Problem: INFECTION - ADULT  Goal: Absence or prevention of progression during hospitalization  Description: INTERVENTIONS:  - Assess and monitor for signs and symptoms of infection  - Monitor lab/diagnostic results  - Monitor all insertion sites, i.e. indwelling lines, tubes, and drains  - Administer medications as ordered  - Instruct and encourage patient and family to use good hand hygiene technique  - Identify and instruct in appropriate isolation precautions for identified infection/condition  Outcome: Progressing    Problem: DISCHARGE PLANNING  Goal: Discharge to home or other facility with appropriate resources  Description: INTERVENTIONS:  - Identify barriers to discharge w/patient and caregiver  - Arrange for needed discharge resources and transportation as appropriate  - Identify discharge learning needs (meds, wound care, etc.)  - Arrange for interpretive services to assist at discharge as needed  - Refer to Case Management Department for coordinating discharge planning if the patient needs post-hospital services based on physician/advanced practitioner order or complex needs related to functional status, cognitive ability, or social support system  Outcome: Progressing     Problem: Knowledge Deficit  Goal: Patient/family/caregiver demonstrates understanding of disease process, treatment plan, medications, and discharge instructions  Description: Complete learning assessment and assess knowledge base.   Interventions:  - Provide teaching at level of understanding  - Provide teaching via preferred learning methods  Outcome: Progressing     Problem: Prexisting or High Potential for Compromised Skin Integrity  Goal: Skin integrity is maintained or improved  Description: INTERVENTIONS:  - Identify patients at risk for skin breakdown  - Assess and monitor skin integrity  - Assess and monitor nutrition and hydration status  - Monitor labs   - Assess for incontinence   - Turn and reposition patient  - Assist with mobility/ambulation  - Relieve pressure over bony prominences  - Avoid friction and shearing  - Provide appropriate hygiene as needed including keeping skin clean and dry  - Evaluate need for skin moisturizer/barrier cream  - Collaborate with interdisciplinary team   - Patient/family teaching  - Consider wound care consult   Outcome: Progressing     Problem: NEUROSENSORY - ADULT  Goal: Achieves stable or improved neurological status  Description: INTERVENTIONS  - Monitor and report changes in neurological status  - Monitor vital signs such as temperature, blood pressure, glucose, and any other labs ordered   - Initiate measures to prevent increased intracranial pressure  - Monitor for seizure activity and implement precautions if appropriate      Outcome: Progressing

## 2023-07-04 NOTE — OCCUPATIONAL THERAPY NOTE
occupational Therapy CX        Patient Name: Taylor Pettit  IVPYR'P Date: 7/4/2023 07/04/23 1229   OT Last Visit   OT Visit Date 07/04/23   Note Type   Note type Cancelled Session   Additional Comments OT ORDERS RECEIVED. PATIENT STILL PENDING READ ON B/L UE XRAYS. OT WILL HOLD AT THIS TIME AND SEE PENDING FINAL READ.          Sabi Machado MS, OTR/L

## 2023-07-04 NOTE — ASSESSMENT & PLAN NOTE
History of multiple stroke  Hospitalized May for multifocal ischemic infarcts from distributions with bilateral carotid stenosis  -Underwent left ICA PTA with stenting on May 4  -Originally it was on Eliquis and aspirin and after recent stroke he was transitioned to aspirin and Brilinta  -Also on Pletal for previous diagnosis of peripheral vascular disease  -During recent hospitalization Eliquis was discontinued without plan to be resumed as neurosurgery who evaluated also the patient together with the neurology felt that strokes are less likely embolic in nature  -Vasculitis work-up was negative  -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 for which outpatient follow-up with neurosurgery was suggested  -He continues on Lipitor 40 mg/day  -He has aphasia and memory loss with ambulatory dysfunction and reported hemiplegia on the nondominant side as effect of the stroke  -Loop recorder replaced in 2019 for previous stroke and reported as negative however, there is a plan to insert the new loop recorder for the patient in the near future with cardiology  -He has completed ARC rehabilitation and was discharged home in the first week of June  -Since being home as per ER spoke with wife the patient has been unable to perform ADL and has experienced more difficulties with walking  -Today he had a fall and was brought to the emergency department as per advice of visiting nurse who felt that the patient will require higher level of care that wife can no longer provide  -CT head in the emergency department negative for acute finding, evolving left parietal stroke  -He appears to be at baseline with some aphasia/dysarthria some memory impairment  -Wife reports that the patient is impulsive at home, not witnessed in the emergency department however  -Resume all chronic medications  -Strict fall precautions  -Strict aspiration precaution  -PT/OT and  evaluation possible placement at least short-term  -Pt family reported change in vision, more lethargy, issues with balance PTA. Apprec neuro inpt. MRI brain d/w neuro - no new stroke, felt stable. Fortunately he appears improved today per his report. Will check EEG per neuro.

## 2023-07-04 NOTE — PROGRESS NOTES
[facility]  Progress Note  Name: Candice Gaines  MRN: [id number]  Unit/Bed#: -01 I Date of Admission: 7/1/2023   Date of Service: 7/4/2023  Hospital Day: 1    Assessment/Plan   Possible NPH (normal pressure hydrocephalus) ([address])  Assessment & Plan  He has established follow-up with neurosurgery, no need for further intervention while in the hospital  No Need for urgent lumbar puncture    Memory deficits  Assessment & Plan  Positive care and Namenda, monitor for sundowning, strict fall precautions    Type 2 diabetes mellitus ([address])  Assessment & Plan  Lab Results   Component Value Date    HGBA1C 5.3 07/02/2023       Recent Labs     07/01/23  1627   POCGLU 79       Blood Sugar Average: Last 72 hrs:  (P) 79     A1c 5.8  No concerns at this time  Not on any agent    [address]  Please refer to principal    * Stroke [facility])  Assessment & Plan  History of multiple stroke  Hospitalized May for multifocal ischemic infarcts from distributions with bilateral carotid stenosis  -Underwent left ICA PTA with stenting on May 4  -Originally it was on Eliquis and aspirin and after recent stroke he was transitioned to aspirin and Brilinta  -Also on Pletal for previous diagnosis of peripheral vascular disease  -During recent hospitalization Eliquis was discontinued without plan to be resumed as neurosurgery who evaluated also the patient together with the neurology felt that strokes are less likely embolic in nature  -Vasculitis work-up was negative  -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 for which outpatient follow-up with neurosurgery was suggested  -He continues on Lipitor 40 mg/day  -He has aphasia and memory loss with ambulatory dysfunction and reported hemiplegia on the nondominant side as effect of the stroke  -Loop recorder replaced in 2019 for previous stroke and reported as negative however, there is a plan to insert the new loop recorder for the patient in the near future with cardiology  -He has completed [facility] rehabilitation and was discharged home in the first week of   -Since being home as per ER spoke with wife the patient has been unable to perform ADL and has experienced more difficulties with walking  -Today he had a fall and was brought to the emergency department as per advice of visiting nurse who felt that the patient will require higher level of care that wife can no longer provide  -CT head in the emergency department negative for acute finding, evolving left parietal stroke  -He appears to be at baseline with some aphasia/dysarthria some memory impairment  -Wife reports that the patient is impulsive at home, not witnessed in the emergency department however  -Resume all chronic medications  -Strict fall precautions  -Strict aspiration precaution  -PT/OT and  evaluation possible placement at least short-term  -Pt family reported change in vision, more lethargy, issues with balance PTA. Apprec neuro inpt. MRI brain d/w neuro - no new stroke, felt stable. Fortunately he appears improved today per his report. Will check EEG per neuro. VTE  Prophylaxis:   Pharmacologic: in place  Mechanical VTE Prophylaxis in Place: Yes    Patient Centered Rounds: I have performed bedside rounds with nursing staff today. Discussions with Specialists or Other Care Team Provider: case management    Education and Discussions with Family / Patient: pt    Current Length of Stay: 1 day(s)    Current Patient Status: Inpatient        Code Status: Level 1 - Full Code    Discharge Plan: Pt will require continued inpatient hospitalization. Subjective:   Pt without new complaints    Patient is seen and examined at bedside. All other ROS are negative.     Objective:     Vitals:   Temp (24hrs), Av.9 °F (36.6 °C), Min:97.7 °F (36.5 °C), Max:98.2 °F (36.8 °C)    Temp:  [97.7 °F (36.5 °C)-98.2 °F (36.8 °C)] 97.7 °F (36.5 °C)  HR:  [63-88] 88  Resp:  [16] 16  BP: (109-143)/(64-74) 142/68  SpO2:  [91 %-94 %] 91 %  Body mass index is 31.26 kg/m². Input and Output Summary (last 24 hours): Intake/Output Summary (Last 24 hours) at 2023 1008  Last data filed at 7/3/2023 2101  Gross per 24 hour   Intake 0 ml   Output 1000 ml   Net -1000 ml       Physical Exam:       GEN: No acute distress, comfortable  HEEENT: No JVD, PERRLA, no scleral icterus  RESP: Lungs clear to auscultation bilaterally  CV: RRR, +s1/s2   ABD: SOFT NON TENDER, POSITIVE BOWEL SOUNDS, NO DISTENTION  PSYCH: CALM  NEURO: Mentation baseline, NO FOCAL DEFICITS  SKIN: NO RASH  EXTREM: NO EDEMA    Additional Data:     Labs:    Results from last 7 days   Lab Units 23  0352   WBC Thousand/uL 9.74   HEMOGLOBIN g/dL 11.6*   HEMATOCRIT % 35.0*   PLATELETS Thousands/uL 246   NEUTROS PCT % 57   LYMPHS PCT % 27   MONOS PCT % 9   EOS PCT % 6     Results from last 7 days   Lab Units 23  0352   SODIUM mmol/L 140   POTASSIUM mmol/L 3.3*   CHLORIDE mmol/L 109*   CO2 mmol/L 26   BUN mg/dL 18   CREATININE mg/dL 0.98   ANION GAP mmol/L 5   CALCIUM mg/dL 8.9   ALBUMIN g/dL 3.4*   TOTAL BILIRUBIN mg/dL 0.57   ALK PHOS U/L 72   ALT U/L 13   AST U/L 12*   GLUCOSE RANDOM mg/dL 89         Results from last 7 days   Lab Units 23  1627   POC GLUCOSE mg/dl 79     Results from last 7 days   Lab Units 23  0330   HEMOGLOBIN A1C % 5.3           Lines/Drains:  Invasive Devices     Peripheral Intravenous Line  Duration           Peripheral IV 23 Right Antecubital 2 days                Telemetry:   Telemetry Orders (From admission, onward)             24 Hour Telemetry Monitoring  Continuous x 24 Hours (Telem)           Question:  Reason for 24 Hour Telemetry  Answer:  TIA/Suspected CVA/ Confirmed CVA                    * I Have Reviewed All Lab Data Listed Above.            Imaging:     Results for orders placed during the hospital encounter of 23    XR chest 1 view portable    Narrative  CHEST    INDICATION:   Altered mental status. COMPARISON:  Chest radiograph 1/12/2023. EXAM PERFORMED/VIEWS:  XR CHEST PORTABLE  AP semierect    FINDINGS:    Left hemithorax loop recorder. Cardiomediastinal silhouette appears unremarkable. The lungs are clear. No pneumothorax or pleural effusion. Osseous structures appear within normal limits for patient age. Impression  No acute cardiopulmonary disease. Workstation performed: [id number]    Results for orders placed during the hospital encounter of 04/26/23    XR chest pa & lateral    Narrative  CHEST    INDICATION:   RLL crackles r/o pneumonia. COMPARISON: 4/16/2023    EXAM PERFORMED/VIEWS:  XR CHEST PA & LATERAL      FINDINGS: Loop recorder is identified. Cardiomediastinal silhouette appears unremarkable. The lungs are clear. No pneumothorax or pleural effusion. Osseous structures appear within normal limits for patient age. Impression  No acute cardiopulmonary disease.             Workstation performed: [id number]      *I have reviewed all imaging reports listed above      Recent Cultures (last 7 days):           Last 24 Hours Medication List:   Current Facility-Administered Medications   Medication Dose Route Frequency Provider Last Rate   • acetaminophen  975 mg Oral TID PRN Opal Ching MD     • albuterol  1 puff Inhalation Q4H PRN Opal Ching MD     • aspirin  81 mg Oral Daily Opal Ching MD     • atorvastatin  40 mg Oral Daily With Breakfast Opal Ching MD     • baclofen  5 mg Oral Q12H Aris Rodriguez PA-C     • cilostazol  100 mg Oral BID AC Opal Ching MD     • donepezil  10 mg Oral HS Aris Rodriguez PA-C     • heparin (porcine)  5,000 Units Subcutaneous Q8H [address] Opal Ching MD     • hydrALAZINE  5 mg Intravenous Q6H PRN Francisco Shaw MD     • lidocaine  1 patch Topical Daily Aris Rodriguez PA-C     • magnesium Oxide  400 mg Oral BID Sita Galvez MD     • metoprolol succinate  100 mg Oral Daily Sly Howard MD     • pantoprazole  40 mg Oral Daily Sly Howard MD     • polyethylene glycol  17 g Oral QAM Sly Howard MD     • senna-docusate sodium  2 tablet Oral BID Sly Howard MD     • tamsulosin  0.4 mg Oral Daily With Kasey Enriquez MD     • ticagrelor  90 mg Oral Q12H [address] Sly Howard MD     • traZODone  50 mg Oral HS Sly Howard MD          Today, Patient Was Seen By: Sita Galvez MD    ** Please Note: Dictation voice to text software may have been used in the creation of this document.  **

## 2023-07-04 NOTE — UTILIZATION REVIEW
Continued Stay Review    Date: 7/4            Current Patient Class: IP Current Level of Care: MS    HPI:63 y.o. male initially admitted on 7/1 as OBS and changed to IP on 7/3    Assessment/Plan:   Pt with MRI Brain showing evolving left parietal/occipital infarct with residual but improving DWI signal that is now mostly normalized on ADC. No acute infarct. Has h/o previous CVA with aphasia, memory loss, ambulatory dysfunciton as residual.  Had fall and changes in mentation, ability to do ADLs. No new stroke per neuro. Pt may need rehab or higher level of care. On exam today pt notes improvement, he is calm, no acute distress. Waiting EEG - pt with h/o seizures. Waiting imaging to be completed.       Vital Signs:   07/04/23 09:20:20 -- 88 -- 142/68 93 91 % None (Room air) Lying   07/04/23 0918 -- 88 -- 142/68 -- -- -- --   07/04/23 07:18:49 97.7 °F (36.5 °C) 63 16 109/64 79 93 % -- --   07/03/23 21:50:32 98.2 °F (36.8 °C) 78 -- 139/74 96 92 % -- --   07/03/23 14:00:03 97.9 °F (36.6 °C) 64 16 143/67 92 94 % -- --   07/03/23 07:07:53 97.7 °F (36.5 °C) 63 17 141/67 92 94 % -- --   07/02/23 21:10:09 97.7 °F (36.5 °C) 70 16 145/68 94 93 % -- --   07/02/23 14:38:32 98.2 °F (36.8 °C) 62 15 154/76 102 96 % -- --   07/02/23 0900 -- -- -- -- -- -- None (Room air) --   07/02/23 06:56:24 97.8 °F (36.6 °C) 58 -- 166/81 109 97 % -- --   07/02/23 06:54:58 97.8 °F (36.6 °C) 61 17 -- -- 98 % -- --   07/02/23 06:54:57 -- -- -- 187/68 Abnormal  108 -- -- --   07/02/23 05:56:44 -- 51 Abnormal  -- 110/89 96 96 % -- --   07/02/23 0338 -- 62 -- 162/85 111 98 % -- --   07/02/23 02:24:31 97.4 °F (36.3 °C) Abnormal  48 Abnormal  -- 182/86 Abnormal  118 97 % -- --     Pertinent Labs/Diagnostic Results:     7/3 Xray R shoulder - P     7/3 Xray L shoulder - P     7/4 EEG - P       Results from last 7 days   Lab Units 07/03/23  0352 07/01/23  1328   WBC Thousand/uL 9.74 10.88*   HEMOGLOBIN g/dL 11.6* 12.6   HEMATOCRIT % 35.0* 37.5 PLATELETS Thousands/uL 246 272   NEUTROS ABS Thousands/µL 5.53 6.39         Results from last 7 days   Lab Units 07/03/23  0352 07/01/23  1328   SODIUM mmol/L 140 142   POTASSIUM mmol/L 3.3* 4.0   CHLORIDE mmol/L 109* 108   CO2 mmol/L 26 27   ANION GAP mmol/L 5 7   BUN mg/dL 18 16   CREATININE mg/dL 0.98 1.17   EGFR ml/min/1.73sq m 81 65   CALCIUM mg/dL 8.9 9.5     Results from last 7 days   Lab Units 07/03/23  0352   AST U/L 12*   ALT U/L 13   ALK PHOS U/L 72   TOTAL PROTEIN g/dL 5.6*   ALBUMIN g/dL 3.4*   TOTAL BILIRUBIN mg/dL 0.57     Results from last 7 days   Lab Units 07/01/23  1627   POC GLUCOSE mg/dl 79     Results from last 7 days   Lab Units 07/03/23  0352 07/01/23  1328   GLUCOSE RANDOM mg/dL 89 95         Results from last 7 days   Lab Units 07/02/23  0330   HEMOGLOBIN A1C % 5.3   EAG mg/dl 105     Medications:   Scheduled Medications:  aspirin, 81 mg, Oral, Daily  atorvastatin, 40 mg, Oral, Daily With Breakfast  baclofen, 5 mg, Oral, Q12H  cilostazol, 100 mg, Oral, BID AC  donepezil, 10 mg, Oral, HS  heparin (porcine), 5,000 Units, Subcutaneous, Q8H JOSE  lidocaine, 1 patch, Topical, Daily  magnesium Oxide, 400 mg, Oral, BID  metoprolol succinate, 100 mg, Oral, Daily  pantoprazole, 40 mg, Oral, Daily  polyethylene glycol, 17 g, Oral, QAM  senna-docusate sodium, 2 tablet, Oral, BID  tamsulosin, 0.4 mg, Oral, Daily With Dinner  ticagrelor, 90 mg, Oral, Q12H JOSE  traZODone, 50 mg, Oral, HS      Continuous IV Infusions:     PRN Meds:  acetaminophen, 975 mg, Oral, TID PRN  albuterol, 1 puff, Inhalation, Q4H PRN  hydrALAZINE, 5 mg, Intravenous, Q6H PRN    Discharge Plan: D    Network Utilization Review Department  ATTENTION: Please call with any questions or concerns to [phone] and carefully listen to the prompts so that you are directed to the right person.  All voicemails are confidential.  Terry Esters all requests for admission clinical reviews, approved or denied determinations and any other requests to dedicated fax number below belonging to the campus where the patient is receiving treatment.  List of dedicated fax numbers for the Facilities:  [place] [facility] (Administrative/Medical Necessity) [phone]   [address] (Maternity/NICU/Pediatrics) [phone]   [address] [place] [address] [phone]   [facility] [phone]   [address] [place] [address] [phone]   [id number] [place] [address] [phone]

## 2023-07-05 ENCOUNTER — APPOINTMENT (INPATIENT)
Dept: NEUROLOGY | Facility: HOSPITAL | Age: 63
DRG: 057 | End: 2023-07-05
Attending: PSYCHIATRY & NEUROLOGY
Payer: COMMERCIAL

## 2023-07-05 LAB
ALBUMIN SERPL BCP-MCNC: 3.7 G/DL (ref 3.5–5)
ALP SERPL-CCNC: 76 U/L (ref 34–104)
ALT SERPL W P-5'-P-CCNC: 12 U/L (ref 7–52)
ANION GAP SERPL CALCULATED.3IONS-SCNC: 6 MMOL/L
AST SERPL W P-5'-P-CCNC: 12 U/L (ref 13–39)
BASOPHILS # BLD AUTO: 0.09 THOUSANDS/ÂΜL (ref 0–0.1)
BASOPHILS NFR BLD AUTO: 1 % (ref 0–1)
BILIRUB SERPL-MCNC: 0.56 MG/DL (ref 0.2–1)
BUN SERPL-MCNC: 25 MG/DL (ref 5–25)
CALCIUM SERPL-MCNC: 9.2 MG/DL (ref 8.4–10.2)
CHLORIDE SERPL-SCNC: 109 MMOL/L (ref 96–108)
CO2 SERPL-SCNC: 24 MMOL/L (ref 21–32)
CREAT SERPL-MCNC: 1.02 MG/DL (ref 0.6–1.3)
EOSINOPHIL # BLD AUTO: 0.38 THOUSAND/ÂΜL (ref 0–0.61)
EOSINOPHIL NFR BLD AUTO: 4 % (ref 0–6)
ERYTHROCYTE [DISTWIDTH] IN BLOOD BY AUTOMATED COUNT: 13.5 % (ref 11.6–15.1)
GFR SERPL CREATININE-BSD FRML MDRD: 77 ML/MIN/1.73SQ M
GLUCOSE SERPL-MCNC: 127 MG/DL (ref 65–140)
HCT VFR BLD AUTO: 36.4 % (ref 36.5–49.3)
HGB BLD-MCNC: 12.5 G/DL (ref 12–17)
IMM GRANULOCYTES # BLD AUTO: 0.02 THOUSAND/UL (ref 0–0.2)
IMM GRANULOCYTES NFR BLD AUTO: 0 % (ref 0–2)
LYMPHOCYTES # BLD AUTO: 2.65 THOUSANDS/ÂΜL (ref 0.6–4.47)
LYMPHOCYTES NFR BLD AUTO: 27 % (ref 14–44)
MCH RBC QN AUTO: 32.6 PG (ref 26.8–34.3)
MCHC RBC AUTO-ENTMCNC: 34.3 G/DL (ref 31.4–37.4)
MCV RBC AUTO: 95 FL (ref 82–98)
MONOCYTES # BLD AUTO: 1.05 THOUSAND/ÂΜL (ref 0.17–1.22)
MONOCYTES NFR BLD AUTO: 11 % (ref 4–12)
NEUTROPHILS # BLD AUTO: 5.72 THOUSANDS/ÂΜL (ref 1.85–7.62)
NEUTS SEG NFR BLD AUTO: 57 % (ref 43–75)
NRBC BLD AUTO-RTO: 0 /100 WBCS
PLATELET # BLD AUTO: 258 THOUSANDS/UL (ref 149–390)
PMV BLD AUTO: 9.7 FL (ref 8.9–12.7)
POTASSIUM SERPL-SCNC: 3.4 MMOL/L (ref 3.5–5.3)
PROT SERPL-MCNC: 6 G/DL (ref 6.4–8.4)
RBC # BLD AUTO: 3.84 MILLION/UL (ref 3.88–5.62)
SODIUM SERPL-SCNC: 139 MMOL/L (ref 135–147)
WBC # BLD AUTO: 9.91 THOUSAND/UL (ref 4.31–10.16)

## 2023-07-05 PROCEDURE — 97163 PT EVAL HIGH COMPLEX 45 MIN: CPT

## 2023-07-05 PROCEDURE — 97535 SELF CARE MNGMENT TRAINING: CPT

## 2023-07-05 PROCEDURE — 95816 EEG AWAKE AND DROWSY: CPT

## 2023-07-05 PROCEDURE — 99239 HOSP IP/OBS DSCHRG MGMT >30: CPT | Performed by: HOSPITALIST

## 2023-07-05 PROCEDURE — 97167 OT EVAL HIGH COMPLEX 60 MIN: CPT

## 2023-07-05 PROCEDURE — 95816 EEG AWAKE AND DROWSY: CPT | Performed by: PSYCHIATRY & NEUROLOGY

## 2023-07-05 PROCEDURE — 85025 COMPLETE CBC W/AUTO DIFF WBC: CPT | Performed by: HOSPITALIST

## 2023-07-05 PROCEDURE — 80053 COMPREHEN METABOLIC PANEL: CPT | Performed by: HOSPITALIST

## 2023-07-05 RX ORDER — POTASSIUM CHLORIDE 20 MEQ/1
40 TABLET, EXTENDED RELEASE ORAL ONCE
Status: COMPLETED | OUTPATIENT
Start: 2023-07-05 | End: 2023-07-05

## 2023-07-05 RX ADMIN — POTASSIUM CHLORIDE 40 MEQ: 1500 TABLET, EXTENDED RELEASE ORAL at 12:30

## 2023-07-05 RX ADMIN — PANTOPRAZOLE SODIUM 40 MG: 40 TABLET, DELAYED RELEASE ORAL at 08:00

## 2023-07-05 RX ADMIN — TICAGRELOR 90 MG: 90 TABLET ORAL at 08:01

## 2023-07-05 RX ADMIN — ATORVASTATIN CALCIUM 40 MG: 40 TABLET, FILM COATED ORAL at 08:01

## 2023-07-05 RX ADMIN — CILOSTAZOL 100 MG: 50 TABLET ORAL at 05:51

## 2023-07-05 RX ADMIN — CILOSTAZOL 100 MG: 50 TABLET ORAL at 15:34

## 2023-07-05 RX ADMIN — TICAGRELOR 90 MG: 90 TABLET ORAL at 21:11

## 2023-07-05 RX ADMIN — TAMSULOSIN HYDROCHLORIDE 0.4 MG: 0.4 CAPSULE ORAL at 15:34

## 2023-07-05 RX ADMIN — LIDOCAINE 5% 1 PATCH: 700 PATCH TOPICAL at 08:01

## 2023-07-05 RX ADMIN — DONEPEZIL HYDROCHLORIDE 10 MG: 5 TABLET ORAL at 21:10

## 2023-07-05 RX ADMIN — ASPIRIN 81 MG: 81 TABLET, COATED ORAL at 08:01

## 2023-07-05 RX ADMIN — BACLOFEN 5 MG: 10 TABLET ORAL at 21:11

## 2023-07-05 RX ADMIN — HEPARIN SODIUM 5000 UNITS: 5000 INJECTION INTRAVENOUS; SUBCUTANEOUS at 05:51

## 2023-07-05 RX ADMIN — METOPROLOL SUCCINATE 100 MG: 50 TABLET, EXTENDED RELEASE ORAL at 08:01

## 2023-07-05 RX ADMIN — HEPARIN SODIUM 5000 UNITS: 5000 INJECTION INTRAVENOUS; SUBCUTANEOUS at 15:34

## 2023-07-05 RX ADMIN — TRAZODONE HYDROCHLORIDE 50 MG: 50 TABLET ORAL at 21:11

## 2023-07-05 RX ADMIN — HEPARIN SODIUM 5000 UNITS: 5000 INJECTION INTRAVENOUS; SUBCUTANEOUS at 21:11

## 2023-07-05 RX ADMIN — BACLOFEN 5 MG: 10 TABLET ORAL at 08:00

## 2023-07-05 NOTE — UTILIZATION REVIEW
NOTIFICATION OF INPATIENT ADMISSION   AUTHORIZATION REQUEST   SERVICING FACILITY:   [address] [place] [address]  [address] [facility],Third [address] [address]  Tax ID: [id number]  NPI: [id number] ATTENDING PROVIDER:  Attending Name and NPI#: Esperanza Hendricks [[id number]]  Address: [address] [facility],Third [address] [address]  Phone: [phone]   ADMISSION INFORMATION:  Place of Service: [address] [place] [address]  Place of Service Code: 21  Inpatient Admission Date/Time: 7/3/23  1:51 PM  Discharge Date/Time: No discharge date for patient encounter. Admitting Diagnosis Code/Description:  Headache [R51.9]     UTILIZATION REVIEW CONTACT:  Marly Montaño Utilization   Network Utilization Review Department  Phone: [phone]  Fax [phone]  Email: [email or web address]  Contact for approvals/pending authorizations, clinical reviews, and discharge. PHYSICIAN ADVISORY SERVICES:  Medical Necessity Denial & [facility] Review  Phone: [phone]  Fax: [phone]  Email: [email or web address]

## 2023-07-05 NOTE — PHYSICAL THERAPY NOTE
Pt seen for PT eval this morning (see separate note for eval). Notified by Nabil Liao that pt's wife requesting to speak w/ therapy. Cites multiple concerns w/ DC recommendation for Level 3 (HHPT), reports lack of support available at home. Called pt's wife, Severiano Corpus ([phone]) w/ OT Vivien Brunner, and CM Alicia De La Torre present. Provided update on how pt performed during PT evaluation    Pt's wife reports he requires assistance "every second of every day", and she works from home and is unable to provide assistance as pt requires "constant assistance". Multiple concerns about herself not being able to leave her home due to pt's     Pt's wife with multiple complaints about patient returning home. States he can get up and walk around the room, but then states he has balance problems and falls multiple. She reports he was just home for 4 days and only had a HHPT eval and they reportedly told her that she would benefit from IP rehab. "If people want to release him and say he's okay to go home. That's perfectly fine. But it's going to be a revolving door of him coming back to the hospital"    Pt's wife more understanding and appreciative at end of call w/ no further questions.     Phone call w/ wife from [phone]

## 2023-07-05 NOTE — PLAN OF CARE
Problem: PHYSICAL THERAPY ADULT  Goal: Performs mobility at highest level of function for planned discharge setting. See evaluation for individualized goals. Description: Treatment/Interventions: Functional transfer training, LE strengthening/ROM, Elevations, Therapeutic exercise, Endurance training, Cognitive reorientation, Patient/family training, Equipment eval/education, Bed mobility, Gait training  Equipment Recommended: Kwaku Aguilar       See flowsheet documentation for full assessment, interventions and recommendations. 7/5/2023 1342 by Josep Briceño PT  Note:    Problem List: Decreased strength, Impaired balance, Decreased endurance, Decreased mobility, Decreased safety awareness, Impaired judgement, Decreased cognition  Assessment: Rachell Zayas is a 58 y.o. Male who presents to [address] [place] [address] on 7/1/2023 from home w/ c/o HA x 1 week and diagnosis of stroke (however not acute). Orders for PT eval and treat received. Pt presents w/ comorbidities of recent CVA (4/2023) w/ residual cognitive, visual, coordination, speech deficits. At baseline, pt mobilizes S w/ RW, and reports + falls in the last 6 months. Upon evaluation, pt presents w/ the following deficits: weakness, impaired coordination, impaired balance and impaired vision. Upon eval, pt requires S for transfers, and min A x 1 for gait. Based on this PT evaluation today, patient's discharge recommendation is for Level II (moderate PAC rehab resource intensity - pending level of A available at home). During this admission, pt would benefit from continued skilled inpatient PT in the acute care setting in order to address the abovementioned deficits to maximize function and mobility before DC from acute care. Barriers to Discharge: (S) Decreased caregiver support          See flowsheet documentation for full assessment.

## 2023-07-05 NOTE — UTILIZATION REVIEW
Initial Clinical Review    WAS OBSERVATION 7/1/23 @ 1530 CONVERTED TO INPATIENT ADMISSION 7/3/23 @@ 1351 DUE TO CONTINUED STAY REQUIRED TO CARE FOR PATIENT WITH DX: STROKE . Admission: Date/Time/Statement:   Admission Orders (From admission, onward)     Ordered        07/03/23 1351  Inpatient Admission  Once            07/01/23 1530  Place in Observation  Once                      Orders Placed This Encounter   Procedures   • Inpatient Admission     Standing Status:   Standing     Number of Occurrences:   1     Order Specific Question:   Level of Care     Answer:   Hospice Routine Care [21]     Order Specific Question:   Estimated length of stay     Answer:   More than 2 Midnights     Order Specific Question:   Certification     Answer:   I certify that inpatient services are medically necessary for this patient for a duration of greater than two midnights. See H&P and MD Progress Notes for additional information about the patient's course of treatment. ED Arrival Information     Expected   -    Arrival   7/1/2023 13:15    Acuity   Urgent            Means of arrival   Ambulance    Escorted by   [facility]    Service   Hospitalist    Admission type   Emergency            Arrival complaint   headache           Chief Complaint   Patient presents with   • Headache     Pt to ED from home via EMS c/o headache x 1 week. Pt recent Hx of stroke, wife concerned this morning because he seemed more tired than usual.       Initial Presentation: 58 y.o. male to ED via EMS from home  Present to ED upon concern of visiting nurse. The patient is that according to the patient multiple falls at home since released from postacute rehab. Visiting nurse found that the patient had fallen again today.  Per patients wife the patient has been unable to perform ADL and has experienced more difficulties with walking  PMHX multiple strokes (most recent being may 2023), DM; Dyslipidemia; HLD; HTN; s/p left ICA PTA with stenting on May 4, 2023  Admitted to OBS with DX: Stroke  on exam: hypertensive; Cranial nerve deficit present - dysarthria and aphasia slight left facial  droop ; while in ED (+) blurred vision with vision changes - On exam, pt does have visual field deficits in left inferomedial field and right inferolateral field. CT negative for acute finding, evolving left parietal stroke  PLAN: cont asa and brilinta; neuro checks; Cardiopulmonary monitoring; monitor labs; PT / OT eval / tx     Date: 7/2/23  Day 2 OBSERVATION  Pt family reported change in vision, however, pt states feels a lot better; [facility] 10.88  Plan: cont asa and brilinta; neuro checks; Cardiopulmonary monitoring; monitor labs; PT / OT eval / tx ; f/u MRI; consult Neuro    NEUROLOGY CONSULT: eval for acute cva  Gait dysfunction multifactorial. No radiographic or clinical evidence to suggest acute neurologic event including another acute cva. Suspicion for fatigue possible doing a little bit more activities that he can currently tolerate and needs to build up more slowly. He has clinical evidence of large fiber neuropathy with his proprioceptive deficits, deconditioning, mild rt sided weakness from recent stroke along with the rt homonymous hemianopsia, prior spasticity from old rt mca cva also contributing along with possible NPH. Pt has subacute known left temperoparietooccipital cva and had completed [facility] rehab and discharged home on the first week of June. Since discharge he's been unable to perform his ADLs and has been experiencing more difficulties with walking. Mri brain w/o contrast today appears to be similar to prior mri from 5/2/23 with improving dwi signal given the time that has gone by and more importantly no acute cva. Speech is slow however since the stroke he has improved significantly from the speech standpoint. Pt has had some rue/rle weakness since the cva.  Furthermore per neurosurgery recent note also has a field cut which could explain his visual symptoms that can fluctuate and per radiographic location of recent stroke, visual deficits can be explained by this. Do not appear he is having seizures and it appears headache may have contributed as well. Plan: Continue PT; Continue pletal, brilinta, aspirin, lipitor regimen; No further inpatient neuro recs.       Date: 7/3/23 - CHANGED TO INPATIENT  Plan: f/u EEG; cont asa and brilinta; neuro checks; Cardiopulmonary monitoring; monitor labs; PT / OT eval / tx      ED Triage Vitals [07/01/23 1317]   Temperature Pulse Respirations Blood Pressure SpO2   98.4 °F (36.9 °C) 60 18 154/67 98 %      Temp Source Heart Rate Source Patient Position - Orthostatic VS BP Location FiO2 (%)   Oral Monitor Sitting Left arm --      Pain Score       3          Wt Readings from Last 1 Encounters:   07/05/23 90.3 kg (199 lb 1.2 oz)     Additional Vital Signs:   Date/Time Temp Pulse Resp BP MAP (mmHg) SpO2 O2 Device Patient Position - Orthostatic VS   07/03/23 14:00:03 97.9 °F (36.6 °C) 64 16 143/67 92 94 % -- --   07/03/23 07:07:53 97.7 °F (36.5 °C) 63 17 141/67 92 94 % -- --   07/02/23 21:10:09 97.7 °F (36.5 °C) 70 16 145/68 94 93 % -- --   07/02/23 14:38:32 98.2 °F (36.8 °C) 62 15 154/76 102 96 % -- --       Date/Time Temp Pulse Resp BP MAP (mmHg) SpO2 O2 Device Patient Position - Orthostatic VS   07/02/23 06:56:24 97.8 °F (36.6 °C) 58 -- 166/81 109 97 % -- --   07/02/23 06:54:58 97.8 °F (36.6 °C) 61 17 -- -- 98 % -- --   07/02/23 06:54:57 -- -- -- 187/68 Abnormal  108 -- -- --   07/02/23 05:56:44 -- 51 Abnormal  -- 110/89 96 96 % -- --   07/02/23 0338 -- 62 -- 162/85 111 98 % -- --   07/02/23 02:24:31 97.4 °F (36.3 °C) Abnormal  48 Abnormal  -- 182/86 Abnormal  118 97 % -- --   07/01/23 23:36:43 -- 69 -- 115/53 74 93 % -- --   07/01/23 2216 -- 80 -- 140/62 88 96 % -- --   07/01/23 22:15:53 -- 80 -- 140/62 88 96 % -- --   07/01/23 21:13:35 -- 71 -- 144/76 99 96 % -- --   07/01/23 19:59:06 97.6 °F (36.4 °C) 67 -- 157/77 104 96 % -- --   07/01/23 16:26:35 97.6 °F (36.4 °C) 62 -- 182/74 Abnormal  110 98 % -- --   07/01/23 1530 -- 54 Abnormal  18 167/71 102 99 % None (Room air) Lying   07/01/23 1415 -- 53 Abnormal  16 172/70 Abnormal  -- 99 % None (Room air) Lying   07/01/23 1325 -- -- -- -- -- -- None (Room air) --   07/01/23 1317 98.4 °F (36.9 °C) 60 18 154/67 -- 98 % None (Room air) Sitting         EKG: None obtained      Pertinent Labs/Diagnostic Test Results:   MRI brain wo contrast   Final Result by HAL Maynard MD (07/02 1316)      Redemonstration of evolving left parietal/occipital infarct with residual but improving DWI signal that is now mostly normalized on ADC. No acute infarct or hemorrhage. Stable ventricular prominence, correlate for NPH. Workstation performed: [id number]         CT head wo contrast   Final Result by Toño Delong MD (07/01 1548)      Evidence of maturing left occipital parietal infarct. There is increased encephalomalacia. No new hemorrhage. Ventriculomegaly is disproportionate and could represent normal pressure hydrocephalus.                   Workstation performed: [id number]         XR shoulder 2+ vw left    (Results Pending)   XR shoulder 2+ vw right    (Results Pending)         Results from last 7 days   Lab Units 07/05/23 0318 07/03/23  0352 07/01/23  1328   WBC Thousand/uL 9.91 9.74 10.88*   HEMOGLOBIN g/dL 12.5 11.6* 12.6   HEMATOCRIT % 36.4* 35.0* 37.5   PLATELETS Thousands/uL 258 246 272   NEUTROS ABS Thousands/µL 5.72 5.53 6.39         Results from last 7 days   Lab Units 07/05/23 0318 07/03/23  0352 07/01/23  1328   SODIUM mmol/L 139 140 142   POTASSIUM mmol/L 3.4* 3.3* 4.0   CHLORIDE mmol/L 109* 109* 108   CO2 mmol/L 24 26 27   ANION GAP mmol/L 6 5 7   BUN mg/dL 25 18 16   CREATININE mg/dL 1.02 0.98 1.17   EGFR ml/min/1.73sq m 77 81 65   CALCIUM mg/dL 9.2 8.9 9.5     Results from last 7 days   Lab Units 07/05/23  0318 07/03/23  0352   AST U/L 12* 12*   ALT U/L 12 13   ALK PHOS U/L 76 72   TOTAL PROTEIN g/dL 6.0* 5.6*   ALBUMIN g/dL 3.7 3.4*   TOTAL BILIRUBIN mg/dL 0.56 0.57     Results from last 7 days   Lab Units 07/01/23  1627   POC GLUCOSE mg/dl 79     Results from last 7 days   Lab Units 07/05/23  0318 07/03/23  0352 07/01/23  1328   GLUCOSE RANDOM mg/dL 127 89 95       ED Treatment:   Medication Administration from 07/01/2023 1315 to 07/01/2023 1616       Date/Time Order Dose Route Action     07/01/2023 1411 EDT sodium chloride 0.9 % bolus 1,000 mL 1,000 mL Intravenous New Bag          Present on Admission:  • Fall  • Memory deficits  • Type 2 diabetes mellitus (HCC)  • Possible NPH (normal pressure hydrocephalus) (HCC)  • Stroke [facility])      Admitting Diagnosis: Headache [R51.9]     Age/Sex: 58 y.o. male     Admission Orders: SCDs; neuro checks; I/S; Cardiopulmonary monitoring; daily wts; I/O; aspiration precautions; regular diet    Scheduled Medications:  aspirin, 81 mg, Oral, Daily  atorvastatin, 40 mg, Oral, Daily With Breakfast  baclofen, 5 mg, Oral, Q12H  cilostazol, 100 mg, Oral, BID AC  donepezil, 10 mg, Oral, HS  heparin (porcine), 5,000 Units, Subcutaneous, Q8H JOSE  lidocaine, 1 patch, Topical, Daily  metoprolol succinate, 100 mg, Oral, Daily  pantoprazole, 40 mg, Oral, Daily  polyethylene glycol, 17 g, Oral, QAM  senna-docusate sodium, 2 tablet, Oral, BID  tamsulosin, 0.4 mg, Oral, Daily With Dinner  ticagrelor, 90 mg, Oral, Q12H JOSE  traZODone, 50 mg, Oral, HS      Continuous IV Infusions:  none     PRN Meds:  acetaminophen, 975 mg, Oral, TID PRN  albuterol, 1 puff, Inhalation, Q4H PRN        IP CONSULT TO CASE MANAGEMENT  IP CONSULT TO NEUROLOGY  IP CONSULT TO NUTRITION SERVICES    Network Utilization Review Department  ATTENTION: Please call with any questions or concerns to [phone] and carefully listen to the prompts so that you are directed to the right person.  All voicemails are confidential.  Becky Cardoso all requests for admission clinical reviews, approved or denied determinations and any other requests to dedicated fax number below belonging to the campus where the patient is receiving treatment.  List of dedicated fax numbers for the Facilities:  [place] DENIALS (Administrative/Medical Necessity) [phone] [address] (Maternity/NICU/Pediatrics) [phone]   [address] [place] [address] [phone]   [facility] [address] [facility] [phone]   [address] [place] [address] [phone]   [id number] [place] [address] [address] [place] [address] Nn [phone]

## 2023-07-05 NOTE — ASSESSMENT & PLAN NOTE
History of multiple stroke  Hospitalized May for multifocal ischemic infarcts from distributions with bilateral carotid stenosis  -Underwent left ICA PTA with stenting on May 4  -Originally it was on Eliquis and aspirin and after recent stroke he was transitioned to aspirin and Brilinta  -Also on Pletal for previous diagnosis of peripheral vascular disease  -During recent hospitalization Eliquis was discontinued without plan to be resumed as neurosurgery who evaluated also the patient together with the neurology felt that strokes are less likely embolic in nature  -Vasculitis work-up was negative  -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 for which outpatient follow-up with neurosurgery was suggested  -He continues on Lipitor 40 mg/day  -He has aphasia and memory loss with ambulatory dysfunction and reported hemiplegia on the nondominant side as effect of the stroke  -Loop recorder replaced in 2019 for previous stroke and reported as negative however, there is a plan to insert the new loop recorder for the patient in the near future with cardiology  -He has completed [facility] rehabilitation and was discharged home in the first week of June  -Since being home as per ER spoke with wife the patient has been unable to perform ADL and has experienced more difficulties with walking  -Today he had a fall and was brought to the emergency department as per advice of visiting nurse who felt that the patient will require higher level of care that wife can no longer provide  -CT head in the emergency department negative for acute finding, evolving left parietal stroke  -He appears to be at baseline with some aphasia/dysarthria some memory impairment  -Wife reports that the patient is impulsive at home, not witnessed in the emergency department however  -Resume all chronic medications  -Strict fall precautions  -Strict aspiration precaution  -PT/OT and  evaluation possible placement at least short-term  -Pt family reported change in vision, more lethargy, issues with balance PTA. Apprec neuro inpt. MRI brain d/w neuro - no new stroke, felt stable. Fortunately he appears improved today per his report. EEG reviewed with Dr. Carmin Landau no objection to discharge.

## 2023-07-05 NOTE — PLAN OF CARE
Problem: Potential for Falls  Goal: Patient will remain free of falls  Description: INTERVENTIONS:  - Educate patient/family on patient safety including physical limitations  - Instruct patient to call for assistance with activity   - Consult OT/PT to assist with strengthening/mobility   - Keep Call bell within reach  - Keep bed low and locked with side rails adjusted as appropriate  - Keep care items and personal belongings within reach  - Initiate and maintain comfort rounds  - Make Fall Risk Sign visible to staff  - Offer Toileting every 2 Hours, in advance of need  - Initiate/Maintain bed alarm  - Obtain necessary fall risk management equipment: alarm, socks  - Apply yellow socks and bracelet for high fall risk patients  - Consider moving patient to room near nurses station  Outcome: Progressing     Problem: MOBILITY - ADULT  Goal: Maintain or return to baseline ADL function  Description: INTERVENTIONS:  -  Assess patient's ability to carry out ADLs; assess patient's baseline for ADL function and identify physical deficits which impact ability to perform ADLs (bathing, care of mouth/teeth, toileting, grooming, dressing, etc.)  - Assess/evaluate cause of self-care deficits   - Assess range of motion  - Assess patient's mobility; develop plan if impaired  - Assess patient's need for assistive devices and provide as appropriate  - Encourage maximum independence but intervene and supervise when necessary  - Involve family in performance of ADLs  - Assess for home care needs following discharge   - Consider OT consult to assist with ADL evaluation and planning for discharge  - Provide patient education as appropriate  Outcome: Progressing  Goal: Maintains/Returns to pre admission functional level  Description: INTERVENTIONS:  - Perform BMAT or MOVE assessment daily.   - Set and communicate daily mobility goal to care team and patient/family/caregiver.    - Collaborate with rehabilitation services on mobility goals if consulted  - Perform Range of Motion 3 times a day. - Reposition patient every 2 hours.   - Dangle patient 3 times a day  - Stand patient 3 times a day  - Ambulate patient 3 times a day  - Out of bed to chair 3 times a day   - Out of bed for meals 3 times a day  - Out of bed for toileting  - Record patient progress and toleration of activity level   Outcome: Progressing     Problem: PAIN - ADULT  Goal: Verbalizes/displays adequate comfort level or baseline comfort level  Description: Interventions:  - Encourage patient to monitor pain and request assistance  - Assess pain using appropriate pain scale  - Administer analgesics based on type and severity of pain and evaluate response  - Implement non-pharmacological measures as appropriate and evaluate response  - Consider cultural and social influences on pain and pain management  - Notify physician/advanced practitioner if interventions unsuccessful or patient reports new pain  Outcome: Progressing

## 2023-07-05 NOTE — CASE MANAGEMENT
Case Management Discharge Planning Note    Patient name Russ President  Location /-81 MRN [id number]  : 1960 Date 2023       Current Admission Date: 2023  Current Admission Diagnosis:Stroke [facility])   Patient Active Problem List    Diagnosis Date Noted   • Aphasia 2023   • Atherosclerosis of native arteries of extremities with intermittent claudication, bilateral legs ([address]) 2023   • Benign prostatic hyperplasia with lower urinary tract symptoms 2023   • Possible NPH (normal pressure hydrocephalus) ([address]) 2023   • Muscle spasms of both lower extremities 2023   • Multiple thyroid nodules 2023   • Abnormal laboratory test 05/15/2023   • History of tobacco use 2023   • Chronic ischemic left MCA stroke 2023   • Hypokalemia 2023   • Infrarenal abdominal aortic aneurysm (AAA) without rupture ([address]) 2023   • Tachycardia 2023   • Prediabetes 2023   • SIRS (systemic inflammatory response syndrome) ([address]) 2023   • Chronic anticoagulation 2023   • Stroke ([address]) 2023   • Hyponatremia 2023   • Middle cerebral artery stenosis, right 2023   • Left posterior MCA stroke - etiology unclear at this time 2023   • Chronic low back pain 2023   • Hypertensive encephalopathy, transient 2023   • Snoring 08/10/2020   • Memory deficits 08/10/2020   • Chronic ischemic right MCA stroke 2019   • Status post placement of implantable loop recorder 2019   • Type 2 diabetes mellitus ([address]) 2019   • Anxiety and depression 2019   • Insomnia 2019   • Fall 2019   • 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 ([address]) 2019   • Urinary retention 2019   • At risk for venous thromboembolism (VTE) 2019   • Hypertriglyceridemia 2019   • Nicotine dependence 2019   • Bilateral carotid artery stenosis 2019   • Presbyopia 2019   • History of stroke 03/19/2019   • Headache 03/19/2019   • Primary hypertension 03/19/2019   • GERD (gastroesophageal reflux disease) 03/19/2019      LOS (days): 2  Geometric Mean LOS (GMLOS) (days):   Days to GMLOS:     OBJECTIVE:  Risk of Unplanned Readmission Score: 21.44         Current admission status: Inpatient   Preferred Pharmacy:   [address] [address], [address] [facility] [address] [facility] [address]  [address] [facility] [address]  Phone: [phone] Fax: [address] [facility], [address] [facility] [address] [place] [address] [address] [address]  Phone: [phone] Fax: [phone]    Primary Care Provider: ERIC Davis    Primary Insurance: BLUE CROSS  Secondary Insurance: [facility] [employer] REP    DISCHARGE DETAILS:    Discharge planning discussed with[de-identified] patient and patient's wife  Freedom of Choice: Yes  Comments - Freedom of Choice: Requesting SNF-blanket referrals sent  CM contacted family/caregiver?: Yes    Contacts  Patient Contacts: Connor Hendricks: wife  Relationship to Patient[de-identified] Family  Contact Method: Phone  Phone Number: [phone]  Reason/Outcome: Emergency Contact, Discharge Planning    Additional Comments: Acknowledge PT/OT recommendation for SNF. Call placed to Pt's wife(Faiza: [phone]), informed Pt's wife of updated recommendation for SNF. Pt's wife in agreement and in agreeament for blanket SNF referrals within 10 miles. Pt also in agreement for SNF and does not have SNF preference. Call placed to Pt's wife(Faiza: [phone]), left message to inform of accepting SNF facilities. Await return call. Will need to obtain insurance auth prior to discharge.

## 2023-07-05 NOTE — PHYSICAL THERAPY NOTE
PHYSICAL THERAPY EVALUATION NOTE    Patient Name: Taylor Pettit  YHXKL'R Date: 2023    AGE:   58 y.o. Mrn:   [id number]  ADMIT DX:  Headache [R51.9]    Past Medical History:   Diagnosis Date    Depression     Diabetes mellitus ([address] )     patient denies    Dyslipidemia 2019    Hyperlipidemia     Hypertension     Stroke [facility])      Length Of Stay: 2  PHYSICAL THERAPY EVALUATION :   Patient's identity confirmed via 2 patient identifiers (full name and ) at start of session       23 0930   PT Last Visit   PT Visit Date 23   Note Type   Note type Evaluation   Pain Assessment   Pain Assessment Tool 0-10   Pain Score No Pain   Restrictions/Precautions   Weight Bearing Precautions Per Order No   Other Precautions Chair Alarm; Bed Alarm; Fall Risk;Visual impairment;Cognitive   Home Living   Type of [address] [place] [address] One level  (1 CHRIS)   Bathroom Shower/Tub Walk-in shower   Home Equipment Walker;Cane   Additional Comments Per CM open, pt reports a 1 SH. Pt reports a FFSU, however in previous notes, pt in a 2 SH, will clarify. Pt uses RW at baseline   Prior Function   Level of [place] Needs assistance with ADLs   Lives With Family; Spouse  (pt states "wife, son, dtr", however wife reports she only lives w/ pt)   Receives Help From Family;Home health  (HHPT)   IADLs Family/Friend/Other provides transportation; Family/Friend/Other provides meals; Family/Friend/Other provides medication management   Falls in the last 6 months 1 to 4  (+ fall)   Vocational Retired   Comments Pt reports living w/ his wife, son, and daughter - however this is inaccurate. Pt's wife [facility] but unable to consistently provide 24/7 S that he requires. Son and DIL live locally and assist as needed   General   Additional Pertinent History Pt recently w/ stay at [facility] s/p CVA. Pt then DC'd [facility]. Pt has been home x 1 week.  Upon DC from [facility], pt was min/mod A for bed mobility and transfers, but total A x 2 to ambulate. Family/Caregiver Present No   Cognition   Overall Cognitive Status Impaired   Arousal/Participation Alert   Orientation Level Oriented to person;Oriented to place;Oriented to situation   Memory Decreased recall of recent events   Following Commands Follows multistep commands with increased time or repetition   Comments Pt w/ expressive aphasia, unable to provide consistent, accurate history   Subjective   Subjective "My legs are cramping"   RLE Assessment   RLE Assessment WFL   Strength RLE   RLE Overall Strength 4-/5   LLE Assessment   LLE Assessment WFL   Strength LLE   LLE Overall Strength 4-/5   Vision-Basic Assessment   Patient Visual Report (S)    (R visual field cut)   Coordination   Movements are Fluid and Coordinated 0  (decreased motor planning RLE during ambulation)   Finger to Nose & Finger to Finger  Impaired  (R UE)   Bed Mobility   Additional Comments Pt seated OOB in recliner chair at start and end of session   Transfers   Sit to Stand 5  Supervision   Additional items Assist x 1; Armrests; Increased time required   Stand to Sit 5  Supervision   Additional items Assist x 1; Armrests; Increased time required   Ambulation/Elevation   Gait pattern Decreased foot clearance  (poor motor planning RLE (overshooting leg placement but then properly placing in front), ? premature extension of knee)   Gait Assistance 4  Minimal assist   Additional items Assist x 1;Verbal cues   Assistive Device Rolling walker   Distance 100 ft   Balance   Static Sitting Fair +   Static Standing Fair -   Ambulatory Poor +  (w/ RW)   Activity Tolerance   Activity Tolerance Patient tolerated treatment well   Medical Staff Made Aware MEY Gagnon   Nurse Made Aware GARRETT [facility]jandre [facility]   Assessment   Problem List Decreased strength; Impaired balance;Decreased endurance;Decreased mobility; Decreased safety awareness; Impaired judgement;Decreased cognition   Assessment Elin Lakhani is a 58 y.o. Male who presents to [address] [place] [address] on 7/1/2023 from home w/ c/o HA x 1 week and diagnosis of stroke (however not acute). Orders for PT eval and treat received. Pt presents w/ comorbidities of recent CVA (4/2023) w/ residual cognitive, visual, coordination, speech deficits. At baseline, pt mobilizes S w/ RW, and reports + falls in the last 6 months. Upon evaluation, pt presents w/ the following deficits: weakness, impaired coordination, impaired balance and impaired vision. Upon eval, pt requires S for transfers, and min A x 1 for gait. Based on this PT evaluation today, patient's discharge recommendation is for Level II (moderate PAC rehab resource intensity - pending level of A available at home). During this admission, pt would benefit from continued skilled inpatient PT in the acute care setting in order to address the abovementioned deficits to maximize function and mobility before DC from acute care. Barriers to Discharge (S)  Decreased caregiver support   Goals   Patient Goals none stated, will continue to assess   [facility] Expiration Date 07/15/23   Short Term Goal #1 Patient will: Perform all bed mobility tasks modified independent to improve pt's independence w/ repositioning for decrease risk of skin breakdown, Perform all transfers modified independent consistently from various height surfaces in order to improve I w/ engagement w/ real-world environments/situations, Ambulate at least 150 ft. with roller walker modified independent w/o LOB to facilitate return and engagement w/ previous living environment and Navigate 1 stairs w/ supervision without handrail to either improve independence w/ entering home and/or so patient can fully access living areas in home   PT Treatment Day 0   Plan   Treatment/Interventions Functional transfer training;LE strengthening/ROM; Elevations; Therapeutic exercise; Endurance training;Cognitive reorientation;Patient/family training;Equipment eval/education; Bed mobility;Gait training   PT Frequency 3-5x/wk   Recommendation   UB Rehab Discharge Recommendation (PT/OT) Level 2   Equipment Recommended Walker   Additional Comments Pt requires supervision/A for safety w/ mobilty and family is unable to feasibly provide at this time. Would benefit from moderate PAC rehab resource intensity   AM-PAC Basic Mobility Inpatient   Turning in Flat Bed Without Bedrails 3   Lying on Back to Sitting on Edge of Flat Bed Without Bedrails 3   Moving Bed to Chair 3   Standing Up From Chair Using Arms 3   Walk in Room 3   Climb 3-5 Stairs With Railing 1   Basic Mobility Inpatient Raw Score 16   Basic Mobility Standardized Score 38.32   Highest Level Of Mobility   JH-HLM Goal 5: Stand one or more mins   JH-HLM Achieved 7: Walk 25 feet or more   End of Consult   Patient Position at End of Consult Bedside chair;Bed/Chair alarm activated; All needs within reach         The patient's AM-PAC Basic Mobility Inpatient Short Form Raw Score is 16, Standardized Score is 38.32. A standardized score less than 38.32 (raw score of 16) suggests the patient may benefit from discharge to post-acute rehabilitation services which may not coincide with above PT recommendations. However please refer to therapist recommendation for discharge planning given other factors that may influence destination.     Pt would benefit from skilled inpatient PT during this admission in order to facilitate progress towards goals to maximize functional independence    Mervyn Cheadle, PT, DPT

## 2023-07-05 NOTE — OCCUPATIONAL THERAPY NOTE
Occupational Therapy Evaluation     Patient Name: Brice Ramirez  TPORN'P Date: 7/5/2023  Problem List  Principal Problem:    Stroke [facility])  Active Problems:    Fall    Type 2 diabetes mellitus ([address])    Memory deficits    Possible NPH (normal pressure hydrocephalus) ([address])    Past Medical History  Past Medical History:   Diagnosis Date    Depression     Diabetes mellitus ([address])     patient denies    Dyslipidemia 03/26/2019    Hyperlipidemia     Hypertension     Stroke [facility])      Past Surgical History  Past Surgical History:   Procedure Laterality Date    BACK SURGERY      IR CEREBRAL ANGIOGRAPHY  5/4/2023    IR STROKE ALERT  3/19/2019    SHOULDER SURGERY             07/05/23 0952   OT Last Visit   OT Visit Date 07/05/23   Note Type   Note type Evaluation   Pain Assessment   Pain Assessment Tool 0-10   Pain Score No Pain   Restrictions/Precautions   Weight Bearing Precautions Per Order No   Other Precautions Fall Risk;Cognitive; Chair Alarm   Home Living   Type of [address] [place] [address] One level  (1 CHRIS)   Bathroom Shower/Tub Walk-in shower   Bathroom Equipment Grab bars in shower; Shower chair   Home Equipment Walker;Cane   Prior Function   Level of [place] Needs assistance with ADLs; Needs assistance with functional mobility   Lives With Spouse  (pt states wife and family, however is just family)   Receives Help From Family;Home health   Falls in the last 6 months 1 to 4   Vocational Retired   Comments Pt reports that wife/son assist as needed. Subjective   Subjective Pt received sitting in recliner. ADL   Eating Assistance 5  Supervision/Setup   Grooming Assistance 5  Supervision/Setup   UB Dressing Assistance 4  Minimal Assistance   LB Dressing Assistance 3  Moderate Assistance   Toileting Assistance  3  Moderate Assistance   Bed Mobility   Additional Comments Pt received seated in recliner.    Transfers   Sit to Stand 5  Supervision   Stand to Sit 5  Supervision   Functional Mobility   Functional Mobility 4  Minimal assistance   Additional Comments sup-CGA x 1, RW into hallway >100 feet   Balance   Static Sitting Fair +   Dynamic Sitting Fair   Static Standing Fair -   Dynamic Standing Fair -   Activity Tolerance   Activity Tolerance Patient tolerated treatment well   Medical Staff Made Aware PT Zainab   Nurse Made Aware GARRETT [place]   RUE Assessment   RUE Assessment WFL   LUE Assessment   LUE Assessment 4-/5 strength   Hand Function   Gross Motor Coordination Functional   Fine Motor Coordination   (ASHLEE. Attempted to assess thumb opposition, however pt unable to follow commands appropriately. Pt repeatedly performing inappropriate motor task during assessment.)   Cognition   Overall Cognitive Status Impaired   Arousal/Participation Alert   Attention Attends with cues to redirect   Orientation Level Oriented to person;Oriented to place;Oriented to situation   Following Commands Follows one step commands with increased time or repetition   Comments Pt with aphasia. Pt with noted difficulty following some motor commands. Assessment   Limitation Decreased ADL status; Decreased self-care trans;Decreased high-level ADLs; Decreased cognition;Decreased fine motor control   Prognosis Fair   Assessment Pt is a 58 y.o. male seen for OT evaluation at [facility], admitted 7/1/2023 w/ Stroke [facility]). Comorbidities affecting pt's functional performance at time of assessment include: hx of CVA, primary HTN,  Memory deficits, etc (see chart). Personal factors affecting pt at time of IE include:difficulty performing ADLS, difficulty performing IADLS  and decreased functional mobility. Prior to admission, pt was living with spouse in house with 1st floor set-up. Pt required assist with ADLS and IADLS, & required walker PTA.  Upon evaluation: Pt requires sup-CGA x 1 for functional mobility/transfers, sup-min for UB ADLs and mod A for LB ADLS 2* the following deficits impacting occupational performance: weakness, decreased balance, decreased tolerance, impaired memory, impaired problem solving and decreased safety awareness. Full objective findings from OT assessment regarding body systems outlined above. These impairments, as well as risk for falls  limit pt's ability to safely engage in all baseline areas of occupation and mobility. Pt to benefit from continued skilled OT tx while in the hospital to address deficits as defined above and maximize level of functional independence w ADL's and functional mobility. Occupational Performance areas to address include: grooming, bathing/shower, toilet hygiene, dressing and functional mobility. This evaluation required an extensive review of medical and/or therapy records and additional review of physical, cognitive and psychosocial history related to functional performance. Based upon functional performance deficits and assessments, this evaluation has been identified as a high complexity evaluation. The patient's raw score on the AM-PAC Daily Activity inpatient short form is 16, standardized score is 35.96, less than 39.4. Patients at this level are likely to benefit from DC to post-acute rehabilitation services. However please refer to therapist recommendation for discharge planning given other factors that may influence destination. At this time, OT recommendations at time of discharge are level 2 resources. Post session, spoke with wife via phone who endorsed inability to provide consistent level of assistance. Therefore recommend level 2 resources. Goals   Patient Goals Pt did not verbalize goals. Plan   Treatment Interventions ADL retraining;Functional transfer training;Patient/family training; Compensatory technique education;Cognitive reorientation   Goal Expiration Date 07/15/23   OT Treatment Day 0   OT Frequency 3-5x/wk   Recommendation   UB Rehab Discharge Recommendation (PT/OT) Level 2  (If family is able to support pt 24/7, recommend level 3 resources.  Otherwise, recommend level 2.)   AM-PAC Daily Activity Inpatient   Lower Body Dressing 2   Bathing 2   Toileting 2   Upper Body Dressing 3   Grooming 3   Eating 4   Daily Activity Raw Score 16   Daily Activity Standardized Score (Calc for Raw Score >=11) 35.96   AM-PAC Applied Cognition Inpatient   Following a Speech/Presentation 2   Understanding Ordinary Conversation 3   Taking Medications 2   Remembering Where Things Are Placed or Put Away 2   Remembering List of 4-5 Errands 2   Taking Care of Complicated Tasks 2   Applied Cognition Raw Score 13   Applied Cognition Standardized Score 30.46   End of Consult   Education Provided Yes   Patient Position at End of Consult Bedside chair;Bed/Chair alarm activated; All needs within reach   Nurse Communication Nurse aware of consult     Pt will achieve the following goals within 10 days. *Pt will complete UB bathing and dressing with mod I.    *Pt will complete LB bathing and dressing with mod I .    * Pt will complete toileting w/ mod I w/ G hygiene/thoroughness using DME PRN    *Pt will complete bed mobility with mod I, with bed flat and no side rail to prep for purposeful tasks    *Pt will perform functional transfers with on/off all surfaces with mod I using DME as needed w/ G balance/safety. *Patient will verbalize 3 safety awareness/ principles to prevent falls in the home setting. *Pt will improve functional mobility during ADL/IADL/leisure tasks to mod I using DME as needed w/ G balance/safety.            MILKA Jacinto/L

## 2023-07-05 NOTE — PLAN OF CARE
Problem: OCCUPATIONAL THERAPY ADULT  Goal: Performs self-care activities at highest level of function for planned discharge setting. See evaluation for individualized goals. Description: Treatment Interventions: ADL retraining, Functional transfer training, Patient/family training, Compensatory technique education, Cognitive reorientation          See flowsheet documentation for full assessment, interventions and recommendations. Note: Limitation: Decreased ADL status, Decreased self-care trans, Decreased high-level ADLs, Decreased cognition, Decreased fine motor control  Prognosis: Fair  Assessment: Pt is a 58 y.o. male seen for OT evaluation at [address] [place] [address], admitted 7/1/2023 w/ Stroke [facility]). Comorbidities affecting pt's functional performance at time of assessment include: hx of CVA, primary HTN,  Memory deficits, etc (see chart). Personal factors affecting pt at time of IE include:difficulty performing ADLS, difficulty performing IADLS  and decreased functional mobility. Prior to admission, pt was living with spouse in house with 1st floor set-up. Pt required assist with ADLS and IADLS, & required walker PTA. Upon evaluation: Pt requires sup-CGA x 1 for functional mobility/transfers, sup-min for UB ADLs and mod A for LB ADLS 2* the following deficits impacting occupational performance: weakness, decreased balance, decreased tolerance, impaired memory, impaired problem solving and decreased safety awareness. Full objective findings from OT assessment regarding body systems outlined above. These impairments, as well as risk for falls  limit pt's ability to safely engage in all baseline areas of occupation and mobility. Pt to benefit from continued skilled OT tx while in the hospital to address deficits as defined above and maximize level of functional independence w ADL's and functional mobility.  Occupational Performance areas to address include: grooming, bathing/shower, toilet hygiene, dressing and functional mobility. This evaluation required an extensive review of medical and/or therapy records and additional review of physical, cognitive and psychosocial history related to functional performance. Based upon functional performance deficits and assessments, this evaluation has been identified as a high complexity evaluation. The patient's raw score on the AM-PAC Daily Activity inpatient short form is 16, standardized score is 35.96, less than 39.4. Patients at this level are likely to benefit from DC to post-acute rehabilitation services. However please refer to therapist recommendation for discharge planning given other factors that may influence destination. At this time, OT recommendations at time of discharge are level 2 resources. Post session, spoke with wife via phone who endorsed inability to provide consistent level of assistance. Therefore recommend level 2 resources.

## 2023-07-05 NOTE — DISCHARGE SUMMARY
[facility]  Discharge- Dain Whitley 1960, 58 y.o. male MRN: [id number]  Unit/Bed#: -01 Encounter: [id number]  Primary Care Provider: ERIC Kilgore   Date and time admitted to hospital: 7/1/2023  1:16 PM  Attempted wife at 55    Possible NPH (normal pressure hydrocephalus) ([address])  Assessment & Plan  He has established follow-up with neurosurgery, no need for further intervention while in the hospital  No Need for urgent lumbar puncture    Memory deficits  Assessment & Plan  Positive care and Namenda, monitor for sundowning, strict fall precautions    Type 2 diabetes mellitus ([address])  Assessment & Plan  Lab Results   Component Value Date    HGBA1C 5.3 07/02/2023       No results for input(s): "POCGLU" in the last 72 hours.     Blood Sugar Average: Last 72 hrs:       A1c 5.8  No concerns at this time  Not on any agent    Fall  Assessment & Plan  Please refer to principal    * Stroke [facility])  Assessment & Plan  History of multiple stroke  Hospitalized May for multifocal ischemic infarcts from distributions with bilateral carotid stenosis  -Underwent left ICA PTA with stenting on May 4  -Originally it was on Eliquis and aspirin and after recent stroke he was transitioned to aspirin and Brilinta  -Also on Pletal for previous diagnosis of peripheral vascular disease  -During recent hospitalization Eliquis was discontinued without plan to be resumed as neurosurgery who evaluated also the patient together with the neurology felt that strokes are less likely embolic in nature  -Vasculitis work-up was negative  -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 for which outpatient follow-up with neurosurgery was suggested  -He continues on Lipitor 40 mg/day  -He has aphasia and memory loss with ambulatory dysfunction and reported hemiplegia on the nondominant side as effect of the stroke  -Loop recorder replaced in 2019 for previous stroke and reported as negative however, there is a plan to insert the new loop recorder for the patient in the near future with cardiology  -He has completed [facility] rehabilitation and was discharged home in the first week of June  -Since being home as per ER spoke with wife the patient has been unable to perform ADL and has experienced more difficulties with walking  -Today he had a fall and was brought to the emergency department as per advice of visiting nurse who felt that the patient will require higher level of care that wife can no longer provide  -CT head in the emergency department negative for acute finding, evolving left parietal stroke  -He appears to be at baseline with some aphasia/dysarthria some memory impairment  -Wife reports that the patient is impulsive at home, not witnessed in the emergency department however  -Resume all chronic medications  -Strict fall precautions  -Strict aspiration precaution  -PT/OT and  evaluation possible placement at least short-term  -Pt family reported change in vision, more lethargy, issues with balance PTA. Apprec neuro inpt. MRI brain d/w neuro - no new stroke, felt stable. Fortunately he appears improved today per his report. EEG reviewed with Dr. Carmin Landau no objection to discharge. Hospital Course:     Dain Whitley is a 58 y.o. male patient who originally presented to the hospital on   Admission Orders (From admission, onward)     Ordered        07/03/23 1351  Inpatient Admission  Once            07/01/23 1530  Place in Observation  Once                     due to    Ambulatory dysfunction. Patient had a fall at home. He was evaluated by physical therapy and cleared to return home. He does not require inpatient therapy at this time. While here he underwent MRI and EEG in the setting of prior strokes and is not felt to have any acute stroke or seizures. Please see above list of diagnoses and related plan for additional information.      Physical Exam:    GEN: No acute distress, comfortable  HEEENT: No JVD, PERRLA, no scleral icterus  RESP: Lungs clear to auscultation bilaterally  CV: RRR, +s1/s2   ABD: SOFT NON TENDER, POSITIVE BOWEL SOUNDS, NO DISTENTION  PSYCH: CALM  NEURO: Mentation baseline, NO FOCAL DEFICITS  SKIN: NO RASH  EXTREM: NO EDEMA    CONSULTING PROVIDERS   IP CONSULT TO CASE MANAGEMENT  IP CONSULT TO NEUROLOGY  IP CONSULT TO NUTRITION SERVICES    PROCEDURES PERFORMED  * No surgery found *    RADIOLOGY RESULTS  EEG awake or drowsy routine    Result Date: 2023  Narrative: Table formatting from the original result was not included. ELECTROENCEPHALOGRAM Patient Name:  Russ Sotelo  MRN: [id number] :  1960 File #: [id number] Date performed: 2023  Referring Provider: Paola Painting MD      Report date: 2023      Study type: awake and drowsy EEG ICD 10 diagnosis: Convulsion/Paroxysmal Spells/Fit NOS R56.9 Patient History: EEG is requested to assess for seizures and/or classification of epilepsy. Patient is 58 y.o. male with a stroke to the left temporal-parietal-occipital region. He presents with seizure-like activity. Current AEDs: Medications include:  Facility-Administered Medications Ordered in Other Visits Medication Dose Route Frequency Provider Last Rate • acetaminophen  975 mg Oral TID PRN Cheyanne Alfonso MD   • albuterol  1 puff Inhalation Q4H PRN Cheyanne Alfonso MD   • aspirin  81 mg Oral Daily Cheyanne Alfonso MD   • atorvastatin  40 mg Oral Daily With Breakfast Cheyanne Alfonso MD   • baclofen  5 mg Oral Q12H Sheryle Hila, PA-C   • cilostazol  100 mg Oral BID AC Cheyanne Alfonso MD   • donepezil  10 mg Oral HS Sheryle Hila, PA-C   • heparin (porcine)  5,000 Units Subcutaneous Q8H [address] Cheyanne Alfonso MD   • hydrALAZINE  5 mg Intravenous Q6H PRN Enoch Ash MD   • lidocaine  1 patch Topical Daily Sheryle Hila, PA-C   • metoprolol succinate  100 mg Oral Daily Cheyanne Alfonso MD   • pantoprazole  40 mg Oral Daily Darien Medrano MD   • polyethylene glycol  17 g Oral QAM Darien Medrano MD   • senna-docusate sodium  2 tablet Oral BID Darien Medrano MD   • tamsulosin  0.4 mg Oral Daily With Haider Madsen MD   • ticagrelor  90 mg Oral Q12H [facility] Darien Medrano MD   • traZODone  50 mg Oral HS Darien Medrano MD   Description of Procedure: The EEG was performed with electrodes applied using the International 10-20 System. Additional electrodes used included EOG, ECG and T1/T2 electrodes. A single lead ECG channel is also present. At least 16 channels are reviewed at a time and formatted into longitudinal bipolar, transverse bipolar, and referential (to common reference or calculated common reference) montages. The EEG was recorded with the patient awake. The recording was technically satisfactory. EEG was recorded from 11:31 to 11:59. Findings: Background Activity: The background is asymmetric due to attentuation of faster frequencies over the left hemisphere (maximally over the left frontotemporal region). During wakefulness, the background is fairly organized with anterior low amplitude alpha-theta activity and posterior low-moderate amplitude theta activity. There is a symmetric 6-7 Hz posterior dominant rhythm that attenuates with eye opening. There is intermittent polymorphic moderate voltage 2-4 Hz delta activity. Activation Procedures: Hyperventilation was not performed. Stepped photic stimulation from 1 to 30 fps was performed and produced no abnormality. Abnormal Findings: There is nearly continuous moderate voltage polymorphic 1-2 Hz delta activity over the left temporal region. Other findings: The single lead ECG shows a regular and sinus rhythm. Interpretation:  This is an abnormal 28 minutes awake EEG due to nearly continuous polymorphic delta activity over the left frontotemporal region, attenuation of faster activity over the left hemisphere, slow posterior rhythm (theta range), and intermittent diffuse delta activity. These findings  Indicate the presence of a structural lesion over the left frontotemporal region superimposed on mild-moderate diffuse cerebral dysfunction. Carol Hill MD PhD [facility]      XR shoulder 2+ vw left    Result Date: 7/5/2023  Narrative: RIGHT SHOULDER INDICATION:   pain, dec rom. COMPARISON:  None VIEWS:  XR SHOULDER 2+ VW RIGHT, XR SHOULDER 2+ VW LEFT FINDINGS: There is no acute fracture or dislocation. There are mild multifocal productive changes. There is approximately 3 mm inferior spurring in bilateral AC joints. There is significant shortening of the acromiohumeral interval on the left, compatible with rotator cuff tendinopathy. No lytic or blastic osseous lesion. Soft tissues are unremarkable. Impression: No acute osseous abnormality. Mild multifocal productive changes. Left rotator cuff tendinopathy. Workstation performed: [id number]     XR shoulder 2+ vw right    Result Date: 7/5/2023  Narrative: RIGHT SHOULDER INDICATION:   pain, dec rom. COMPARISON:  None VIEWS:  XR SHOULDER 2+ VW RIGHT, XR SHOULDER 2+ VW LEFT FINDINGS: There is no acute fracture or dislocation. There are mild multifocal productive changes. There is approximately 3 mm inferior spurring in bilateral AC joints. There is significant shortening of the acromiohumeral interval on the left, compatible with rotator cuff tendinopathy. No lytic or blastic osseous lesion. Soft tissues are unremarkable. Impression: No acute osseous abnormality. Mild multifocal productive changes. Left rotator cuff tendinopathy. Workstation performed: [id number]     MRI brain wo contrast    Result Date: 7/2/2023  Narrative: MRI BRAIN WITHOUT CONTRAST INDICATION: vision changes with prior hx of stroke. COMPARISON:   CT from yesterday. MRI dated 5/30/2023. TECHNIQUE:  Multiplanar, multisequence imaging of the brain was performed.  IMAGE QUALITY:  Diagnostic. FINDINGS: BRAIN PARENCHYMA: Redemonstration of evolving infarct in the left parietal lobe and left occipital lobe with residual but improving DWI signal that is now mostly normalized on ADC. No acute infarct. No acute hemorrhage, mass, mass effect or herniation. Stable focal encephalomalacia in the right frontal lobe. Stable T2/FLAIR hyperintensity in the periventricular and subcortical white matter due to moderate chronic microangiopathy. Chronic lacunar infarcts in the right corona radiata and left basal ganglia. Stable small focus of chronic microhemorrhage in the right parietal lobe. VENTRICLES: Stable ventricular enlargement out of proportion to degree of cortical volume loss, again suggest correlation for NPH. SELLA AND PITUITARY GLAND:  Normal. ORBITS:  Normal. PARANASAL SINUSES:  Normal. VASCULATURE:  Evaluation of the major intracranial vasculature demonstrates appropriate flow voids. CALVARIUM AND SKULL BASE:  Normal. EXTRACRANIAL SOFT TISSUES:  Normal.     Impression: Redemonstration of evolving left parietal/occipital infarct with residual but improving DWI signal that is now mostly normalized on ADC. No acute infarct or hemorrhage. Stable ventricular prominence, correlate for NPH. Workstation performed: [id number]     CT head wo contrast    Result Date: 7/1/2023  Narrative: CT BRAIN - WITHOUT CONTRAST INDICATION:   headache, recent stroke. rule out ICH. Headache for 1 week; patient more tired COMPARISON: CT from 5/31/2023; MRI from 5/30/2023. TECHNIQUE:  CT examination of the brain was performed. Multiplanar 2D reformatted images were created from the source data. Radiation dose length product (DLP) for this visit:  936.43 mGy-cm . This examination, like all CT scans performed in the [facility], was performed utilizing techniques to minimize radiation dose exposure, including the use of iterative  reconstruction and automated exposure control.  IMAGE QUALITY: Diagnostic. FINDINGS: PARENCHYMA:  No intracranial mass, mass effect or midline shift. No CT signs of acute infarction. No acute parenchymal hemorrhage. Patchy areas of diminished white matter attenuation is most likely related to microvascular ischemic change. There is evidence of maturing left occipital parietal infarct with some cortical thinning now developing as compared to the study of May 31. Encephalomalacia also appears to extend slightly superior into the parietal lobe. VENTRICLES AND EXTRA-AXIAL SPACES: There is ventriculomegaly as compared to the degree of sulcal atrophy but similar to the prior study. The atria on the right is somewhat more pronounced than the left. VISUALIZED ORBITS: Normal visualized orbits. PARANASAL SINUSES: Normal visualized paranasal sinuses. CALVARIUM AND EXTRACRANIAL SOFT TISSUES:  Normal.     Impression: Evidence of maturing left occipital parietal infarct. There is increased encephalomalacia. No new hemorrhage. Ventriculomegaly is disproportionate and could represent normal pressure hydrocephalus.  Workstation performed: [id number]       LABS  Results from last 7 days   Lab Units 07/05/23 0318 07/03/23 0352 07/01/23  1328   WBC Thousand/uL 9.91 9.74 10.88*   HEMOGLOBIN g/dL 12.5 11.6* 12.6   HEMATOCRIT % 36.4* 35.0* 37.5   MCV fL 95 98 97   PLATELETS Thousands/uL 258 246 272     Results from last 7 days   Lab Units 07/05/23 0318 07/03/23  0352 07/01/23  1328   SODIUM mmol/L 139 140 142   POTASSIUM mmol/L 3.4* 3.3* 4.0   CHLORIDE mmol/L 109* 109* 108   CO2 mmol/L 24 26 27   BUN mg/dL 25 18 16   CREATININE mg/dL 1.02 0.98 1.17   CALCIUM mg/dL 9.2 8.9 9.5   ALBUMIN g/dL 3.7 3.4*  --    TOTAL BILIRUBIN mg/dL 0.56 0.57  --    ALK PHOS U/L 76 72  --    ALT U/L 12 13  --    AST U/L 12* 12*  --    EGFR ml/min/1.73sq m 77 81 65   GLUCOSE RANDOM mg/dL 127 89 95                  Results from last 7 days   Lab Units 07/01/23  1627   POC GLUCOSE mg/dl 79     Results from last 7 days Lab Units 07/02/23  0330   HEMOGLOBIN A1C % 5.3             Results from last 7 days   Lab Units 07/02/23  0330   TRIGLYCERIDES mg/dL 168*   CHOLESTEROL mg/dL 94   LDL CALC mg/dL 30   HDL mg/dL 30*       Cultures:         Invalid input(s): "URIBILINOGEN"                Condition at Discharge:  good      Discharge instructions/Information to patient and family:   See after visit summary for information provided to patient and family. Provisions for Follow-Up Care:  See after visit summary for information related to follow-up care and any pertinent home health orders. Disposition:     Home       Discharge Statement:  I spent 39 minutes discharging the patient. This time was spent on the day of discharge. I had direct contact with the patient on the day of discharge. Greater than 50% of the total time was spent examining patient, answering all patient questions, arranging and discussing plan of care with patient as well as directly providing post-discharge instructions. Additional time then spent on discharge activities. Discharge Medications:  See after visit summary for reconciled discharge medications provided to patient and family.       ** Please Note: This note has been constructed using a voice recognition system **

## 2023-07-05 NOTE — ASSESSMENT & PLAN NOTE
Lab Results   Component Value Date    HGBA1C 5.3 07/02/2023       No results for input(s): "POCGLU" in the last 72 hours.     Blood Sugar Average: Last 72 hrs:       A1c 5.8  No concerns at this time  Not on any agent

## 2023-07-05 NOTE — PLAN OF CARE
Problem: Potential for Falls  Goal: Patient will remain free of falls  Description: INTERVENTIONS:  - Educate patient/family on patient safety including physical limitations  - Instruct patient to call for assistance with activity   - Consult OT/PT to assist with strengthening/mobility   - Keep Call bell within reach  - Keep bed low and locked with side rails adjusted as appropriate  - Keep care items and personal belongings within reach  - Initiate and maintain comfort rounds  - Make Fall Risk Sign visible to staff  - Offer Toileting every 2 Hours, in advance of need  - Initiate/Maintain bed alarm  - Obtain necessary fall risk management equipment: alarm, socks  - Apply yellow socks and bracelet for high fall risk patients  - Consider moving patient to room near nurses station  Outcome: Progressing     Problem: MOBILITY - ADULT  Goal: Maintain or return to baseline ADL function  Description: INTERVENTIONS:  -  Assess patient's ability to carry out ADLs; assess patient's baseline for ADL function and identify physical deficits which impact ability to perform ADLs (bathing, care of mouth/teeth, toileting, grooming, dressing, etc.)  - Assess/evaluate cause of self-care deficits   - Assess range of motion  - Assess patient's mobility; develop plan if impaired  - Assess patient's need for assistive devices and provide as appropriate  - Encourage maximum independence but intervene and supervise when necessary  - Involve family in performance of ADLs  - Assess for home care needs following discharge   - Consider OT consult to assist with ADL evaluation and planning for discharge  - Provide patient education as appropriate  Outcome: Progressing  Goal: Maintains/Returns to pre admission functional level  Description: INTERVENTIONS:  - Perform BMAT or MOVE assessment daily.   - Set and communicate daily mobility goal to care team and patient/family/caregiver.    - Collaborate with rehabilitation services on mobility goals if consulted  - Perform Range of Motion 3 times a day. - Reposition patient every 2 hours.   - Dangle patient 3 times a day  - Stand patient 3 times a day  - Ambulate patient 3 times a day  - Out of bed to chair 3 times a day   - Out of bed for meals 3 times a day  - Out of bed for toileting  - Record patient progress and toleration of activity level   Outcome: Progressing     Problem: PAIN - ADULT  Goal: Verbalizes/displays adequate comfort level or baseline comfort level  Description: Interventions:  - Encourage patient to monitor pain and request assistance  - Assess pain using appropriate pain scale  - Administer analgesics based on type and severity of pain and evaluate response  - Implement non-pharmacological measures as appropriate and evaluate response  - Consider cultural and social influences on pain and pain management  - Notify physician/advanced practitioner if interventions unsuccessful or patient reports new pain  Outcome: Progressing     Problem: INFECTION - ADULT  Goal: Absence or prevention of progression during hospitalization  Description: INTERVENTIONS:  - Assess and monitor for signs and symptoms of infection  - Monitor lab/diagnostic results  - Monitor all insertion sites, i.e. indwelling lines, tubes, and drains  - Administer medications as ordered  - Instruct and encourage patient and family to use good hand hygiene technique  - Identify and instruct in appropriate isolation precautions for identified infection/condition  Outcome: Progressing     Problem: DISCHARGE PLANNING  Goal: Discharge to home or other facility with appropriate resources  Description: INTERVENTIONS:  - Identify barriers to discharge w/patient and caregiver  - Arrange for needed discharge resources and transportation as appropriate  - Identify discharge learning needs (meds, wound care, etc.)  - Arrange for interpretive services to assist at discharge as needed  - Refer to Case Management Department for coordinating discharge planning if the patient needs post-hospital services based on physician/advanced practitioner order or complex needs related to functional status, cognitive ability, or social support system  Outcome: Progressing     Problem: Knowledge Deficit  Goal: Patient/family/caregiver demonstrates understanding of disease process, treatment plan, medications, and discharge instructions  Description: Complete learning assessment and assess knowledge base.   Interventions:  - Provide teaching at level of understanding  - Provide teaching via preferred learning methods  Outcome: Progressing     Problem: Prexisting or High Potential for Compromised Skin Integrity  Goal: Skin integrity is maintained or improved  Description: INTERVENTIONS:  - Identify patients at risk for skin breakdown  - Assess and monitor skin integrity  - Assess and monitor nutrition and hydration status  - Monitor labs   - Assess for incontinence   - Turn and reposition patient  - Assist with mobility/ambulation  - Relieve pressure over bony prominences  - Avoid friction and shearing  - Provide appropriate hygiene as needed including keeping skin clean and dry  - Evaluate need for skin moisturizer/barrier cream  - Collaborate with interdisciplinary team   - Patient/family teaching  - Consider wound care consult   Outcome: Progressing     Problem: NEUROSENSORY - ADULT  Goal: Achieves stable or improved neurological status  Description: INTERVENTIONS  - Monitor and report changes in neurological status  - Monitor vital signs such as temperature, blood pressure, glucose, and any other labs ordered   - Initiate measures to prevent increased intracranial pressure  - Monitor for seizure activity and implement precautions if appropriate      Outcome: Progressing

## 2023-07-06 LAB
ALBUMIN SERPL BCP-MCNC: 3.7 G/DL (ref 3.5–5)
ALP SERPL-CCNC: 74 U/L (ref 34–104)
ALT SERPL W P-5'-P-CCNC: 12 U/L (ref 7–52)
ANION GAP SERPL CALCULATED.3IONS-SCNC: 8 MMOL/L
AST SERPL W P-5'-P-CCNC: 13 U/L (ref 13–39)
BASOPHILS # BLD AUTO: 0.11 THOUSANDS/ÂΜL (ref 0–0.1)
BASOPHILS NFR BLD AUTO: 1 % (ref 0–1)
BILIRUB SERPL-MCNC: 0.51 MG/DL (ref 0.2–1)
BUN SERPL-MCNC: 24 MG/DL (ref 5–25)
CALCIUM SERPL-MCNC: 9.1 MG/DL (ref 8.4–10.2)
CHLORIDE SERPL-SCNC: 109 MMOL/L (ref 96–108)
CO2 SERPL-SCNC: 23 MMOL/L (ref 21–32)
CREAT SERPL-MCNC: 0.91 MG/DL (ref 0.6–1.3)
EOSINOPHIL # BLD AUTO: 0.61 THOUSAND/ÂΜL (ref 0–0.61)
EOSINOPHIL NFR BLD AUTO: 6 % (ref 0–6)
ERYTHROCYTE [DISTWIDTH] IN BLOOD BY AUTOMATED COUNT: 13.8 % (ref 11.6–15.1)
GFR SERPL CREATININE-BSD FRML MDRD: 88 ML/MIN/1.73SQ M
GLUCOSE SERPL-MCNC: 94 MG/DL (ref 65–140)
HCT VFR BLD AUTO: 37.5 % (ref 36.5–49.3)
HGB BLD-MCNC: 12.4 G/DL (ref 12–17)
IMM GRANULOCYTES # BLD AUTO: 0.04 THOUSAND/UL (ref 0–0.2)
IMM GRANULOCYTES NFR BLD AUTO: 0 % (ref 0–2)
LYMPHOCYTES # BLD AUTO: 3.04 THOUSANDS/ÂΜL (ref 0.6–4.47)
LYMPHOCYTES NFR BLD AUTO: 29 % (ref 14–44)
MCH RBC QN AUTO: 32 PG (ref 26.8–34.3)
MCHC RBC AUTO-ENTMCNC: 33.1 G/DL (ref 31.4–37.4)
MCV RBC AUTO: 97 FL (ref 82–98)
MONOCYTES # BLD AUTO: 0.97 THOUSAND/ÂΜL (ref 0.17–1.22)
MONOCYTES NFR BLD AUTO: 9 % (ref 4–12)
NEUTROPHILS # BLD AUTO: 5.86 THOUSANDS/ÂΜL (ref 1.85–7.62)
NEUTS SEG NFR BLD AUTO: 55 % (ref 43–75)
NRBC BLD AUTO-RTO: 0 /100 WBCS
PLATELET # BLD AUTO: 249 THOUSANDS/UL (ref 149–390)
PMV BLD AUTO: 9.9 FL (ref 8.9–12.7)
POTASSIUM SERPL-SCNC: 3.4 MMOL/L (ref 3.5–5.3)
PROT SERPL-MCNC: 6.1 G/DL (ref 6.4–8.4)
RBC # BLD AUTO: 3.87 MILLION/UL (ref 3.88–5.62)
SODIUM SERPL-SCNC: 140 MMOL/L (ref 135–147)
WBC # BLD AUTO: 10.63 THOUSAND/UL (ref 4.31–10.16)

## 2023-07-06 PROCEDURE — 99232 SBSQ HOSP IP/OBS MODERATE 35: CPT | Performed by: HOSPITALIST

## 2023-07-06 PROCEDURE — 85025 COMPLETE CBC W/AUTO DIFF WBC: CPT | Performed by: HOSPITALIST

## 2023-07-06 PROCEDURE — 80053 COMPREHEN METABOLIC PANEL: CPT | Performed by: HOSPITALIST

## 2023-07-06 RX ADMIN — TICAGRELOR 90 MG: 90 TABLET ORAL at 21:04

## 2023-07-06 RX ADMIN — TAMSULOSIN HYDROCHLORIDE 0.4 MG: 0.4 CAPSULE ORAL at 17:00

## 2023-07-06 RX ADMIN — TRAZODONE HYDROCHLORIDE 50 MG: 50 TABLET ORAL at 21:04

## 2023-07-06 RX ADMIN — BACLOFEN 5 MG: 10 TABLET ORAL at 21:04

## 2023-07-06 RX ADMIN — TICAGRELOR 90 MG: 90 TABLET ORAL at 09:01

## 2023-07-06 RX ADMIN — CILOSTAZOL 100 MG: 50 TABLET ORAL at 06:35

## 2023-07-06 RX ADMIN — CILOSTAZOL 100 MG: 50 TABLET ORAL at 17:00

## 2023-07-06 RX ADMIN — DONEPEZIL HYDROCHLORIDE 10 MG: 5 TABLET ORAL at 21:04

## 2023-07-06 RX ADMIN — BACLOFEN 5 MG: 10 TABLET ORAL at 09:01

## 2023-07-06 RX ADMIN — LIDOCAINE 5% 1 PATCH: 700 PATCH TOPICAL at 09:00

## 2023-07-06 RX ADMIN — METOPROLOL SUCCINATE 100 MG: 50 TABLET, EXTENDED RELEASE ORAL at 09:01

## 2023-07-06 RX ADMIN — ASPIRIN 81 MG: 81 TABLET, COATED ORAL at 09:01

## 2023-07-06 RX ADMIN — PANTOPRAZOLE SODIUM 40 MG: 40 TABLET, DELAYED RELEASE ORAL at 09:01

## 2023-07-06 RX ADMIN — HEPARIN SODIUM 5000 UNITS: 5000 INJECTION INTRAVENOUS; SUBCUTANEOUS at 13:37

## 2023-07-06 RX ADMIN — ATORVASTATIN CALCIUM 40 MG: 40 TABLET, FILM COATED ORAL at 09:01

## 2023-07-06 RX ADMIN — HEPARIN SODIUM 5000 UNITS: 5000 INJECTION INTRAVENOUS; SUBCUTANEOUS at 06:35

## 2023-07-06 RX ADMIN — HEPARIN SODIUM 5000 UNITS: 5000 INJECTION INTRAVENOUS; SUBCUTANEOUS at 21:04

## 2023-07-06 NOTE — PLAN OF CARE
Problem: PAIN - ADULT  Goal: Verbalizes/displays adequate comfort level or baseline comfort level  Description: Interventions:  - Encourage patient to monitor pain and request assistance  - Assess pain using appropriate pain scale  - Administer analgesics based on type and severity of pain and evaluate response  - Implement non-pharmacological measures as appropriate and evaluate response  - Consider cultural and social influences on pain and pain management  - Notify physician/advanced practitioner if interventions unsuccessful or patient reports new pain  Outcome: Progressing     Problem: INFECTION - ADULT  Goal: Absence or prevention of progression during hospitalization  Description: INTERVENTIONS:  - Assess and monitor for signs and symptoms of infection  - Monitor lab/diagnostic results  - Monitor all insertion sites, i.e. indwelling lines, tubes, and drains  - Administer medications as ordered  - Instruct and encourage patient and family to use good hand hygiene technique  - Identify and instruct in appropriate isolation precautions for identified infection/condition  Outcome: Progressing

## 2023-07-06 NOTE — CASE MANAGEMENT
Case Management Discharge Planning Note    Patient name Casper Nicholson  Location /-18 MRN [id number]  : 1960 Date 2023       Current Admission Date: 2023  Current Admission Diagnosis:Stroke [facility])   Patient Active Problem List    Diagnosis Date Noted   • Aphasia 2023   • Atherosclerosis of native arteries of extremities with intermittent claudication, bilateral legs ([address]) 2023   • Benign prostatic hyperplasia with lower urinary tract symptoms 2023   • Possible NPH (normal pressure hydrocephalus) ([address]) 2023   • Muscle spasms of both lower extremities 2023   • Multiple thyroid nodules 2023   • Abnormal laboratory test 05/15/2023   • History of tobacco use 2023   • Chronic ischemic left MCA stroke 2023   • Hypokalemia 2023   • Infrarenal abdominal aortic aneurysm (AAA) without rupture ([address]) 2023   • Tachycardia 2023   • Prediabetes 2023   • SIRS (systemic inflammatory response syndrome) ([address]) 2023   • Chronic anticoagulation 2023   • Stroke ([address]) 2023   • Hyponatremia 2023   • Middle cerebral artery stenosis, right 2023   • Left posterior MCA stroke - etiology unclear at this time 2023   • Chronic low back pain 2023   • Hypertensive encephalopathy, transient 2023   • Snoring 08/10/2020   • Memory deficits 08/10/2020   • Chronic ischemic right MCA stroke 2019   • Status post placement of implantable loop recorder 2019   • Type 2 diabetes mellitus ([address]) 2019   • Anxiety and depression 2019   • Insomnia 2019   • Fall 2019   • 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 [address] VALDEZ AYALA [address] [place] [address] Road  [address] [facility] [address]  Phone: [phone] Fax: [address] [facility], [address] [facility] [address] [place] [address] [address] [address]  Phone: [phone] Fax: [phone]    Primary Care Provider: ERIC Levi    Primary Insurance: BLUE CROSS  Secondary Insurance: [facility] REP    DISCHARGE DETAILS:  Additional Comments: Message received from Pt's wife re: SNF choices. Pt's wife reports she is agreeable to [facility]. [facility] has bed available. Spoke with Pt's wife(Faiza) and informed her that [facility] has bed available. SNF auth request sent to [facility] discharge support. Await determination.

## 2023-07-06 NOTE — ASSESSMENT & PLAN NOTE
History of multiple stroke  Hospitalized May for multifocal ischemic infarcts from distributions with bilateral carotid stenosis  -Underwent left ICA PTA with stenting on May 4  -Originally it was on Eliquis and aspirin and after recent stroke he was transitioned to aspirin and Brilinta  -Also on Pletal for previous diagnosis of peripheral vascular disease  -During recent hospitalization Eliquis was discontinued without plan to be resumed as neurosurgery who evaluated also the patient together with the neurology felt that strokes are less likely embolic in nature  -Vasculitis work-up was negative  -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 for which outpatient follow-up with neurosurgery was suggested  -He continues on Lipitor 40 mg/day  -He has aphasia and memory loss with ambulatory dysfunction and reported hemiplegia on the nondominant side as effect of the stroke  -Loop recorder replaced in 2019 for previous stroke and reported as negative however, there is a plan to insert the new loop recorder for the patient in the near future with cardiology  -He has completed ARC rehabilitation and was discharged home in the first week of June  -Since being home as per ER spoke with wife the patient has been unable to perform ADL and has experienced more difficulties with walking  -Today he had a fall and was brought to the emergency department as per advice of visiting nurse who felt that the patient will require higher level of care that wife can no longer provide  -CT head in the emergency department negative for acute finding, evolving left parietal stroke  -He appears to be at baseline with some aphasia/dysarthria some memory impairment  -Wife reports that the patient is impulsive at home, not witnessed in the emergency department however  -Resume all chronic medications  -Strict fall precautions  -Strict aspiration precaution  -PT/OT and  evaluation possible placement at least short-term  -Pt family reported change in vision, more lethargy, issues with balance PTA. Apprec neuro inpt. MRI brain d/w neuro - no new stroke, felt stable. Fortunately he appears improved today per his report. EEG reviewed with Dr. Joesph Wu no epileptiform activity no objection to discharge.

## 2023-07-06 NOTE — PROGRESS NOTES
[facility]  Progress Note  Name: James Bentley  MRN: [id number]  Unit/Bed#: -01 I Date of Admission: 7/1/2023   Date of Service: 7/6/2023 I Hospital Day: 3    Assessment/Plan   Possible NPH (normal pressure hydrocephalus) ([address])  Assessment & Plan  He has established follow-up with neurosurgery, no need for further intervention while in the hospital  No Need for urgent lumbar puncture    Memory deficits  Assessment & Plan  Positive care and Namenda, monitor for sundowning, strict fall precautions    Type 2 diabetes mellitus ([address])  Assessment & Plan  Lab Results   Component Value Date    HGBA1C 5.3 07/02/2023       No results for input(s): "POCGLU" in the last 72 hours.     Blood Sugar Average: Last 72 hrs:       A1c 5.8  No concerns at this time  Not on any agent    Fall  Assessment & Plan  Please refer to principal    * Stroke [facility])  Assessment & Plan  History of multiple stroke  Hospitalized May for multifocal ischemic infarcts from distributions with bilateral carotid stenosis  -Underwent left ICA PTA with stenting on May 4  -Originally it was on Eliquis and aspirin and after recent stroke he was transitioned to aspirin and Brilinta  -Also on Pletal for previous diagnosis of peripheral vascular disease  -During recent hospitalization Eliquis was discontinued without plan to be resumed as neurosurgery who evaluated also the patient together with the neurology felt that strokes are less likely embolic in nature  -Vasculitis work-up was negative  -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 for which outpatient follow-up with neurosurgery was suggested  -He continues on Lipitor 40 mg/day  -He has aphasia and memory loss with ambulatory dysfunction and reported hemiplegia on the nondominant side as effect of the stroke  -Loop recorder replaced in 2019 for previous stroke and reported as negative however, there is a plan to insert the new loop recorder for the patient in the near future with cardiology  -He has completed ARC rehabilitation and was discharged home in the first week of   -Since being home as per ER spoke with wife the patient has been unable to perform ADL and has experienced more difficulties with walking  -Today he had a fall and was brought to the emergency department as per advice of visiting nurse who felt that the patient will require higher level of care that wife can no longer provide  -CT head in the emergency department negative for acute finding, evolving left parietal stroke  -He appears to be at baseline with some aphasia/dysarthria some memory impairment  -Wife reports that the patient is impulsive at home, not witnessed in the emergency department however  -Resume all chronic medications  -Strict fall precautions  -Strict aspiration precaution  -PT/OT and  evaluation possible placement at least short-term  -Pt family reported change in vision, more lethargy, issues with balance PTA. Apprec neuro inpt. MRI brain d/w neuro - no new stroke, felt stable. Fortunately he appears improved today per his report. EEG reviewed with Dr. Patti Swain no epileptiform activity no objection to discharge. VTE  Prophylaxis:   Pharmacologic: in place  Mechanical VTE Prophylaxis in Place: Yes    Patient Centered Rounds: I have performed bedside rounds with nursing staff today. Discussions with Specialists or Other Care Team Provider: case management    Education and Discussions with Family / Patient: pt      Current Length of Stay: 3 day(s)    Current Patient Status: Inpatient        Code Status: Level 1 - Full Code    Discharge Plan: Pt will require continued inpatient hospitalization. Subjective:   Pt without new complaints    Patient is seen and examined at bedside. All other ROS are negative.     Objective:     Vitals:   Temp (24hrs), Av.7 °F (36.5 °C), Min:97.6 °F (36.4 °C), Max:97.9 °F (36.6 °C)    Temp:  [97.6 °F (36.4 °C)-97.9 °F (36.6 °C)] 97.7 °F (36.5 °C)  HR:  [65-78] 65  Resp:  [16-18] 18  BP: (140-160)/(70-78) 160/78  SpO2:  [97 %-98 %] 97 %  Body mass index is 31.27 kg/m². Input and Output Summary (last 24 hours): Intake/Output Summary (Last 24 hours) at 2023 1259  Last data filed at 2023 1248  Gross per 24 hour   Intake 1020 ml   Output 250 ml   Net 770 ml       Physical Exam:       GEN: No acute distress, comfortable  HEEENT: No JVD, PERRLA, no scleral icterus  RESP: Lungs clear to auscultation bilaterally  CV: RRR, +s1/s2   ABD: SOFT NON TENDER, POSITIVE BOWEL SOUNDS, NO DISTENTION  PSYCH: CALM  NEURO: Mentation baseline, NO FOCAL DEFICITS  SKIN: NO RASH  EXTREM: NO EDEMA    Additional Data:     Labs:    Results from last 7 days   Lab Units 23  0457   WBC Thousand/uL 10.63*   HEMOGLOBIN g/dL 12.4   HEMATOCRIT % 37.5   PLATELETS Thousands/uL 249   NEUTROS PCT % 55   LYMPHS PCT % 29   MONOS PCT % 9   EOS PCT % 6     Results from last 7 days   Lab Units 23  0457   SODIUM mmol/L 140   POTASSIUM mmol/L 3.4*   CHLORIDE mmol/L 109*   CO2 mmol/L 23   BUN mg/dL 24   CREATININE mg/dL 0.91   ANION GAP mmol/L 8   CALCIUM mg/dL 9.1   ALBUMIN g/dL 3.7   TOTAL BILIRUBIN mg/dL 0.51   ALK PHOS U/L 74   ALT U/L 12   AST U/L 13   GLUCOSE RANDOM mg/dL 94         Results from last 7 days   Lab Units 23  1627   POC GLUCOSE mg/dl 79     Results from last 7 days   Lab Units 23  0330   HEMOGLOBIN A1C % 5.3           Lines/Drains:  Invasive Devices     Peripheral Intravenous Line  Duration           Peripheral IV 23 Dorsal (posterior); Left Forearm 1 day                Telemetry:   Telemetry Orders (From admission, onward)             24 Hour Telemetry Monitoring  Continuous x 24 Hours (Telem)           Question:  Reason for 24 Hour Telemetry  Answer:  TIA/Suspected CVA/ Confirmed CVA                    * I Have Reviewed All Lab Data Listed Above.            Imaging:     Results for orders placed during the hospital encounter of 04/16/23    XR chest 1 view portable    Narrative  CHEST    INDICATION:   Altered mental status. COMPARISON:  Chest radiograph 1/12/2023. EXAM PERFORMED/VIEWS:  XR CHEST PORTABLE  AP semierect    FINDINGS:    Left hemithorax loop recorder. Cardiomediastinal silhouette appears unremarkable. The lungs are clear. No pneumothorax or pleural effusion. Osseous structures appear within normal limits for patient age. Impression  No acute cardiopulmonary disease. Workstation performed: [id number]    Results for orders placed during the hospital encounter of 04/26/23    XR chest pa & lateral    Narrative  CHEST    INDICATION:   RLL crackles r/o pneumonia. COMPARISON: 4/16/2023    EXAM PERFORMED/VIEWS:  XR CHEST PA & LATERAL      FINDINGS: Loop recorder is identified. Cardiomediastinal silhouette appears unremarkable. The lungs are clear. No pneumothorax or pleural effusion. Osseous structures appear within normal limits for patient age. Impression  No acute cardiopulmonary disease.             Workstation performed: [id number]      *I have reviewed all imaging reports listed above      Recent Cultures (last 7 days):           Last 24 Hours Medication List:   Current Facility-Administered Medications   Medication Dose Route Frequency Provider Last Rate   • acetaminophen  975 mg Oral TID PRN Keanu Leblanc MD     • albuterol  1 puff Inhalation Q4H PRN Keanu Leblanc MD     • aspirin  81 mg Oral Daily Keanu Leblanc MD     • atorvastatin  40 mg Oral Daily With Breakfast Keanu Leblanc MD     • baclofen  5 mg Oral Q12H Tiara Delgado PA-C     • cilostazol  100 mg Oral BID AC Keanu Leblanc MD     • donepezil  10 mg Oral HS Tiara Delgado PA-C     • heparin (porcine)  5,000 Units Subcutaneous Q8H [address] Keanu Leblanc MD     • hydrALAZINE  5 mg Intravenous Q6H PRN Gómez Banuelos MD     • lidocaine  1 patch Topical Daily Alfa BUNN Rafita Posadas PA-C     • metoprolol succinate  100 mg Oral Daily Keanu Leblanc MD     • pantoprazole  40 mg Oral Daily Keanu Leblanc MD     • polyethylene glycol  17 g Oral QAM Keanu Leblanc MD     • senna-docusate sodium  2 tablet Oral BID Keanu Leblanc MD     • tamsulosin  0.4 mg Oral Daily With Vaibhav Ayala MD     • ticagrelor  90 mg Oral Q12H [address] Keanu Leblanc MD     • traZODone  50 mg Oral HS Keanu Leblanc MD          Today, Patient Was Seen By: Gómez Banuelos MD    ** Please Note: Dictation voice to text software may have been used in the creation of this document.  **

## 2023-07-06 NOTE — CASE MANAGEMENT
[address] [place] [address] received request for authorization from Care Manager. Authorization request for: SNF  [facility] Name: [facility]. NPI: [id number]  Facility MD: Dr Clifford Jackson.  NPI: [id number]  Authorization initiated by contacting insurance: [address]: Phone ([phone] submitted via: Fax ([phone])

## 2023-07-07 PROCEDURE — 99232 SBSQ HOSP IP/OBS MODERATE 35: CPT | Performed by: HOSPITALIST

## 2023-07-07 RX ADMIN — BACLOFEN 5 MG: 10 TABLET ORAL at 21:20

## 2023-07-07 RX ADMIN — HEPARIN SODIUM 5000 UNITS: 5000 INJECTION INTRAVENOUS; SUBCUTANEOUS at 06:47

## 2023-07-07 RX ADMIN — TAMSULOSIN HYDROCHLORIDE 0.4 MG: 0.4 CAPSULE ORAL at 16:10

## 2023-07-07 RX ADMIN — CILOSTAZOL 100 MG: 50 TABLET ORAL at 16:10

## 2023-07-07 RX ADMIN — LIDOCAINE 5% 1 PATCH: 700 PATCH TOPICAL at 08:19

## 2023-07-07 RX ADMIN — TRAZODONE HYDROCHLORIDE 50 MG: 50 TABLET ORAL at 21:21

## 2023-07-07 RX ADMIN — ASPIRIN 81 MG: 81 TABLET, COATED ORAL at 08:19

## 2023-07-07 RX ADMIN — DONEPEZIL HYDROCHLORIDE 10 MG: 5 TABLET ORAL at 21:21

## 2023-07-07 RX ADMIN — CILOSTAZOL 100 MG: 50 TABLET ORAL at 06:47

## 2023-07-07 RX ADMIN — HEPARIN SODIUM 5000 UNITS: 5000 INJECTION INTRAVENOUS; SUBCUTANEOUS at 14:52

## 2023-07-07 RX ADMIN — BACLOFEN 5 MG: 10 TABLET ORAL at 08:13

## 2023-07-07 RX ADMIN — TICAGRELOR 90 MG: 90 TABLET ORAL at 21:28

## 2023-07-07 RX ADMIN — TICAGRELOR 90 MG: 90 TABLET ORAL at 08:23

## 2023-07-07 RX ADMIN — PANTOPRAZOLE SODIUM 40 MG: 40 TABLET, DELAYED RELEASE ORAL at 08:19

## 2023-07-07 RX ADMIN — ATORVASTATIN CALCIUM 40 MG: 40 TABLET, FILM COATED ORAL at 08:19

## 2023-07-07 RX ADMIN — METOPROLOL SUCCINATE 100 MG: 50 TABLET, EXTENDED RELEASE ORAL at 08:19

## 2023-07-07 NOTE — PROGRESS NOTES
[address] [facility]  Progress Note  Name: Adam Hazel  MRN: [id number]  Unit/Bed#: -01 I Date of Admission: 7/1/2023   Date of Service: 7/7/2023 I Hospital Day: 4    Assessment/Plan   Possible NPH (normal pressure hydrocephalus) ([address])  Assessment & Plan  He has established follow-up with neurosurgery, no need for further intervention while in the hospital  No Need for urgent lumbar puncture    Memory deficits  Assessment & Plan  Positive care and Namenda, monitor for sundowning, strict fall precautions    Type 2 diabetes mellitus ([address])  Assessment & Plan  Lab Results   Component Value Date    HGBA1C 5.3 07/02/2023       No results for input(s): "POCGLU" in the last 72 hours.     Blood Sugar Average: Last 72 hrs:       A1c 5.8  No concerns at this time  Not on any agent    Fall  Assessment & Plan  Please refer to principal    * Stroke [facility])  Assessment & Plan  History of multiple stroke  Hospitalized May for multifocal ischemic infarcts from distributions with bilateral carotid stenosis  -Underwent left ICA PTA with stenting on May 4  -Originally it was on Eliquis and aspirin and after recent stroke he was transitioned to aspirin and Brilinta  -Also on Pletal for previous diagnosis of peripheral vascular disease  -During recent hospitalization Eliquis was discontinued without plan to be resumed as neurosurgery who evaluated also the patient together with the neurology felt that strokes are less likely embolic in nature  -Vasculitis work-up was negative  -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 for which outpatient follow-up with neurosurgery was suggested  -He continues on Lipitor 40 mg/day  -He has aphasia and memory loss with ambulatory dysfunction and reported hemiplegia on the nondominant side as effect of the stroke  -Loop recorder replaced in 2019 for previous stroke and reported as negative however, there is a plan to insert the new loop recorder for the patient in the near future with cardiology  -He has completed ARC rehabilitation and was discharged home in the first week of   -Since being home as per ER spoke with wife the patient has been unable to perform ADL and has experienced more difficulties with walking  -Today he had a fall and was brought to the emergency department as per advice of visiting nurse who felt that the patient will require higher level of care that wife can no longer provide  -CT head in the emergency department negative for acute finding, evolving left parietal stroke  -He appears to be at baseline with some aphasia/dysarthria some memory impairment  -Wife reports that the patient is impulsive at home, not witnessed in the emergency department however  -Resume all chronic medications  -Strict fall precautions  -Strict aspiration precaution  -PT/OT and  evaluation possible placement at least short-term  -Pt family reported change in vision, more lethargy, issues with balance PTA. Apprec neuro inpt. MRI brain d/w neuro - no new stroke, felt stable. Fortunately he appears improved today per his report. EEG reviewed with Dr. Mayur Martel no epileptiform activity no objection to discharge. VTE  Prophylaxis:   Pharmacologic: in place  Mechanical VTE Prophylaxis in Place: Yes    Patient Centered Rounds: I have performed bedside rounds with nursing staff today. Discussions with Specialists or Other Care Team Provider: case management    Education and Discussions with Family / Patient: pt      Current Length of Stay: 4 day(s)    Current Patient Status: Inpatient        Code Status: Level 1 - Full Code    Discharge Plan: Pt will require continued inpatient hospitalization. Subjective:   Pt without acute medical complaints awaiting insurance authorization. Patient is seen and examined at bedside. All other ROS are negative.     Objective:     Vitals:   Temp (24hrs), Av.8 °F (36.6 °C), Min:97.6 °F (36.4 °C), Max:98.1 °F (36.7 °C)    Temp:  [97.6 °F (36.4 °C)-98.1 °F (36.7 °C)] 97.7 °F (36.5 °C)  HR:  [47-73] 47  Resp:  [16-18] 16  BP: (105-122)/(51-59) 105/59  SpO2:  [96 %-98 %] 98 %  Body mass index is 30.09 kg/m². Input and Output Summary (last 24 hours): Intake/Output Summary (Last 24 hours) at 7/7/2023 1452  Last data filed at 7/7/2023 1216  Gross per 24 hour   Intake 240 ml   Output --   Net 240 ml       Physical Exam:       GEN: No acute distress, comfortable  HEEENT: No JVD, PERRLA, no scleral icterus  RESP: Lungs clear to auscultation bilaterally  CV: RRR, +s1/s2   ABD: SOFT NON TENDER, POSITIVE BOWEL SOUNDS, NO DISTENTION  PSYCH: CALM  NEURO: Mentation baseline, NO FOCAL DEFICITS  SKIN: NO RASH  EXTREM: NO EDEMA    Additional Data:     Labs:    Results from last 7 days   Lab Units 07/06/23  0457   WBC Thousand/uL 10.63*   HEMOGLOBIN g/dL 12.4   HEMATOCRIT % 37.5   PLATELETS Thousands/uL 249   NEUTROS PCT % 55   LYMPHS PCT % 29   MONOS PCT % 9   EOS PCT % 6     Results from last 7 days   Lab Units 07/06/23  0457   SODIUM mmol/L 140   POTASSIUM mmol/L 3.4*   CHLORIDE mmol/L 109*   CO2 mmol/L 23   BUN mg/dL 24   CREATININE mg/dL 0.91   ANION GAP mmol/L 8   CALCIUM mg/dL 9.1   ALBUMIN g/dL 3.7   TOTAL BILIRUBIN mg/dL 0.51   ALK PHOS U/L 74   ALT U/L 12   AST U/L 13   GLUCOSE RANDOM mg/dL 94         Results from last 7 days   Lab Units 07/01/23  1627   POC GLUCOSE mg/dl 79     Results from last 7 days   Lab Units 07/02/23  0330   HEMOGLOBIN A1C % 5.3           Lines/Drains:  Invasive Devices     Peripheral Intravenous Line  Duration           Peripheral IV 07/05/23 Dorsal (posterior); Left Forearm 2 days                Telemetry:        * I Have Reviewed All Lab Data Listed Above. Imaging:     Results for orders placed during the hospital encounter of 04/16/23    XR chest 1 view portable    Narrative  CHEST    INDICATION:   Altered mental status. COMPARISON:  Chest radiograph 1/12/2023.     EXAM PERFORMED/VIEWS: XR CHEST PORTABLE  AP semierect    FINDINGS:    Left hemithorax loop recorder. Cardiomediastinal silhouette appears unremarkable. The lungs are clear. No pneumothorax or pleural effusion. Osseous structures appear within normal limits for patient age. Impression  No acute cardiopulmonary disease. Workstation performed: [id number]    Results for orders placed during the hospital encounter of 04/26/23    XR chest pa & lateral    Narrative  CHEST    INDICATION:   RLL crackles r/o pneumonia. COMPARISON: 4/16/2023    EXAM PERFORMED/VIEWS:  XR CHEST PA & LATERAL      FINDINGS: Loop recorder is identified. Cardiomediastinal silhouette appears unremarkable. The lungs are clear. No pneumothorax or pleural effusion. Osseous structures appear within normal limits for patient age. Impression  No acute cardiopulmonary disease.             Workstation performed: [id number]      *I have reviewed all imaging reports listed above      Recent Cultures (last 7 days):           Last 24 Hours Medication List:   Current Facility-Administered Medications   Medication Dose Route Frequency Provider Last Rate   • acetaminophen  975 mg Oral TID PRN Suzi Aragon MD     • albuterol  1 puff Inhalation Q4H PRN Suzi Aragon MD     • aspirin  81 mg Oral Daily Suzi Aragon MD     • atorvastatin  40 mg Oral Daily With Breakfast Suzi Aragon MD     • baclofen  5 mg Oral Q12H Hermes Stein PA-C     • cilostazol  100 mg Oral BID AC Suzi Aragon MD     • donepezil  10 mg Oral HS Hermes Stein PA-C     • heparin (porcine)  5,000 Units Subcutaneous Q8H [address] Suzi Aragon MD     • hydrALAZINE  5 mg Intravenous Q6H PRN Rhonda Oconnell MD     • lidocaine  1 patch Topical Daily Hermes Stein PA-C     • metoprolol succinate  100 mg Oral Daily Suzi Aragon MD     • pantoprazole  40 mg Oral Daily Suzi Aragon MD     • polyethylene glycol  17 g Oral QAM Merlinda Muslim, MD     • senna-docusate sodium  2 tablet Oral BID Merlinda Muslim, MD     • tamsulosin  0.4 mg Oral Daily With Forest Yousif MD     • ticagrelor  90 mg Oral Q12H [facility] Merlinda Muslim, MD     • traZODone  50 mg Oral HS Merlinda Muslim, MD          Today, Patient Was Seen By: Carol Lira MD    ** Please Note: Dictation voice to text software may have been used in the creation of this document.  **

## 2023-07-07 NOTE — CASE MANAGEMENT
Case Management Discharge Planning Note    Patient name Jackson Lucio  Location /-59 MRN [id number]  : 1960 Date 2023       Current Admission Date: 2023  Current Admission Diagnosis:Stroke [facility])   Patient Active Problem List    Diagnosis Date Noted   • Aphasia 2023   • Atherosclerosis of native arteries of extremities with intermittent claudication, bilateral legs ([address]) 2023   • Benign prostatic hyperplasia with lower urinary tract symptoms 2023   • Possible NPH (normal pressure hydrocephalus) ([address]) 2023   • Muscle spasms of both lower extremities 2023   • Multiple thyroid nodules 2023   • Abnormal laboratory test 05/15/2023   • History of tobacco use 2023   • Chronic ischemic left MCA stroke 2023   • Hypokalemia 2023   • Infrarenal abdominal aortic aneurysm (AAA) without rupture ([address]) 2023   • Tachycardia 2023   • Prediabetes 2023   • SIRS (systemic inflammatory response syndrome) ([address]) 2023   • Chronic anticoagulation 2023   • Stroke ([address]) 2023   • Hyponatremia 2023   • Middle cerebral artery stenosis, right 2023   • Left posterior MCA stroke - etiology unclear at this time 2023   • Chronic low back pain 2023   • Hypertensive encephalopathy, transient 2023   • Snoring 08/10/2020   • Memory deficits 08/10/2020   • Chronic ischemic right MCA stroke 2019   • Status post placement of implantable loop recorder 2019   • Type 2 diabetes mellitus ([address]) 2019   • Anxiety and depression 2019   • Insomnia 2019   • Fall 2019   • 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 ([address]) 2019   • Urinary retention 2019   • At risk for venous thromboembolism (VTE) 2019   • Hypertriglyceridemia 2019   • Nicotine dependence 2019   • Bilateral carotid artery stenosis 2019   • Presbyopia 2019   • History of stroke 03/19/2019   • Headache 03/19/2019   • Primary hypertension 03/19/2019   • GERD (gastroesophageal reflux disease) 03/19/2019      LOS (days): 4  Geometric Mean LOS (GMLOS) (days):   Days to GMLOS:     OBJECTIVE:  Risk of Unplanned Readmission Score: 21.61         Current admission status: Inpatient   Preferred Pharmacy:   [facility] [address] VALDEZ AYALA [address]  [address] [facility] [address]  Phone: [phone] Fax: [address], [address] [facility] [address] [place] [address] [address] [address]  Phone: [phone] Fax: [phone]    Primary Care Provider: ERIC Calzada    Primary Insurance: BLUE CROSS  Secondary Insurance: [facility] RE[facility]    DISCHARGE DETAILS:  IMM Given (Date):: 07/07/23  IMM Given to[de-identified] Patient     Additional Comments: Left message for Pt's wife to inform her still waiting on insurance auth for [facility]. Informed Susie Singh at [facility], [facility] still waiting for insurance auth. Susie Singh informed CM that if Aggie Feliz is obtained over weekend, that CM can call her at [phone]. Informed Pt that CM is still waiting for insurance auth. Pt expressed understanding. CM to follow.

## 2023-07-07 NOTE — CASE MANAGEMENT
Called to check status of submitted SNF auth request. Spoke to [facility] who stated faxes from 7/3 are just now being processed and cannot confirm fax was received at this time. Confirmed f# [phone]  Is correct.  Per CM request, re-faxed information with ALL information below included on cover sheet:

## 2023-07-07 NOTE — ASSESSMENT & PLAN NOTE
History of multiple stroke  Hospitalized May for multifocal ischemic infarcts from distributions with bilateral carotid stenosis  -Underwent left ICA PTA with stenting on May 4  -Originally it was on Eliquis and aspirin and after recent stroke he was transitioned to aspirin and Brilinta  -Also on Pletal for previous diagnosis of peripheral vascular disease  -During recent hospitalization Eliquis was discontinued without plan to be resumed as neurosurgery who evaluated also the patient together with the neurology felt that strokes are less likely embolic in nature  -Vasculitis work-up was negative  -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 for which outpatient follow-up with neurosurgery was suggested  -He continues on Lipitor 40 mg/day  -He has aphasia and memory loss with ambulatory dysfunction and reported hemiplegia on the nondominant side as effect of the stroke  -Loop recorder replaced in 2019 for previous stroke and reported as negative however, there is a plan to insert the new loop recorder for the patient in the near future with cardiology  -He has completed ARC rehabilitation and was discharged home in the first week of June  -Since being home as per ER spoke with wife the patient has been unable to perform ADL and has experienced more difficulties with walking  -Today he had a fall and was brought to the emergency department as per advice of visiting nurse who felt that the patient will require higher level of care that wife can no longer provide  -CT head in the emergency department negative for acute finding, evolving left parietal stroke  -He appears to be at baseline with some aphasia/dysarthria some memory impairment  -Wife reports that the patient is impulsive at home, not witnessed in the emergency department however  -Resume all chronic medications  -Strict fall precautions  -Strict aspiration precaution  -PT/OT and  evaluation possible placement at least short-term  -Pt family reported change in vision, more lethargy, issues with balance PTA. Apprec neuro inpt. MRI brain d/w neuro - no new stroke, felt stable. Fortunately he appears improved today per his report. EEG reviewed with Dr. Humberto Helm no epileptiform activity no objection to discharge.

## 2023-07-07 NOTE — CASE MANAGEMENT
Received fax requesting correction on auth form. Made correction and refaxed auth form to insurance. CM notified.

## 2023-07-08 LAB
ALBUMIN SERPL BCP-MCNC: 3.7 G/DL (ref 3.5–5)
ALP SERPL-CCNC: 75 U/L (ref 34–104)
ALT SERPL W P-5'-P-CCNC: 14 U/L (ref 7–52)
ANION GAP SERPL CALCULATED.3IONS-SCNC: 7 MMOL/L
AST SERPL W P-5'-P-CCNC: 14 U/L (ref 13–39)
BASOPHILS # BLD AUTO: 0.12 THOUSANDS/ÂΜL (ref 0–0.1)
BASOPHILS NFR BLD AUTO: 1 % (ref 0–1)
BILIRUB SERPL-MCNC: 0.6 MG/DL (ref 0.2–1)
BUN SERPL-MCNC: 19 MG/DL (ref 5–25)
CALCIUM SERPL-MCNC: 9.3 MG/DL (ref 8.4–10.2)
CHLORIDE SERPL-SCNC: 109 MMOL/L (ref 96–108)
CO2 SERPL-SCNC: 24 MMOL/L (ref 21–32)
CREAT SERPL-MCNC: 0.86 MG/DL (ref 0.6–1.3)
EOSINOPHIL # BLD AUTO: 0.5 THOUSAND/ÂΜL (ref 0–0.61)
EOSINOPHIL NFR BLD AUTO: 5 % (ref 0–6)
ERYTHROCYTE [DISTWIDTH] IN BLOOD BY AUTOMATED COUNT: 13.4 % (ref 11.6–15.1)
GFR SERPL CREATININE-BSD FRML MDRD: 91 ML/MIN/1.73SQ M
GLUCOSE SERPL-MCNC: 95 MG/DL (ref 65–140)
HCT VFR BLD AUTO: 37.1 % (ref 36.5–49.3)
HGB BLD-MCNC: 12.7 G/DL (ref 12–17)
IMM GRANULOCYTES # BLD AUTO: 0.03 THOUSAND/UL (ref 0–0.2)
IMM GRANULOCYTES NFR BLD AUTO: 0 % (ref 0–2)
LYMPHOCYTES # BLD AUTO: 3.2 THOUSANDS/ÂΜL (ref 0.6–4.47)
LYMPHOCYTES NFR BLD AUTO: 33 % (ref 14–44)
MCH RBC QN AUTO: 32.8 PG (ref 26.8–34.3)
MCHC RBC AUTO-ENTMCNC: 34.2 G/DL (ref 31.4–37.4)
MCV RBC AUTO: 96 FL (ref 82–98)
MONOCYTES # BLD AUTO: 0.92 THOUSAND/ÂΜL (ref 0.17–1.22)
MONOCYTES NFR BLD AUTO: 9 % (ref 4–12)
NEUTROPHILS # BLD AUTO: 5.03 THOUSANDS/ÂΜL (ref 1.85–7.62)
NEUTS SEG NFR BLD AUTO: 52 % (ref 43–75)
NRBC BLD AUTO-RTO: 0 /100 WBCS
PLATELET # BLD AUTO: 246 THOUSANDS/UL (ref 149–390)
PMV BLD AUTO: 10 FL (ref 8.9–12.7)
POTASSIUM SERPL-SCNC: 3.1 MMOL/L (ref 3.5–5.3)
PROT SERPL-MCNC: 6.2 G/DL (ref 6.4–8.4)
RBC # BLD AUTO: 3.87 MILLION/UL (ref 3.88–5.62)
SODIUM SERPL-SCNC: 140 MMOL/L (ref 135–147)
WBC # BLD AUTO: 9.8 THOUSAND/UL (ref 4.31–10.16)

## 2023-07-08 PROCEDURE — 80053 COMPREHEN METABOLIC PANEL: CPT | Performed by: HOSPITALIST

## 2023-07-08 PROCEDURE — 99232 SBSQ HOSP IP/OBS MODERATE 35: CPT | Performed by: HOSPITALIST

## 2023-07-08 PROCEDURE — 85025 COMPLETE CBC W/AUTO DIFF WBC: CPT | Performed by: HOSPITALIST

## 2023-07-08 RX ORDER — HEPARIN SODIUM 5000 [USP'U]/ML
5000 INJECTION, SOLUTION INTRAVENOUS; SUBCUTANEOUS EVERY 12 HOURS SCHEDULED
Status: DISCONTINUED | OUTPATIENT
Start: 2023-07-08 | End: 2023-07-12 | Stop reason: HOSPADM

## 2023-07-08 RX ADMIN — SENNOSIDES AND DOCUSATE SODIUM 2 TABLET: 8.6; 5 TABLET ORAL at 09:03

## 2023-07-08 RX ADMIN — ASPIRIN 81 MG: 81 TABLET, COATED ORAL at 09:04

## 2023-07-08 RX ADMIN — TICAGRELOR 90 MG: 90 TABLET ORAL at 21:47

## 2023-07-08 RX ADMIN — CILOSTAZOL 100 MG: 50 TABLET ORAL at 16:40

## 2023-07-08 RX ADMIN — LIDOCAINE 5% 1 PATCH: 700 PATCH TOPICAL at 09:03

## 2023-07-08 RX ADMIN — TICAGRELOR 90 MG: 90 TABLET ORAL at 09:06

## 2023-07-08 RX ADMIN — ATORVASTATIN CALCIUM 40 MG: 40 TABLET, FILM COATED ORAL at 09:03

## 2023-07-08 RX ADMIN — METOPROLOL SUCCINATE 100 MG: 50 TABLET, EXTENDED RELEASE ORAL at 09:03

## 2023-07-08 RX ADMIN — CILOSTAZOL 100 MG: 50 TABLET ORAL at 06:27

## 2023-07-08 RX ADMIN — POLYETHYLENE GLYCOL 3350 17 G: 17 POWDER, FOR SOLUTION ORAL at 09:03

## 2023-07-08 RX ADMIN — TRAZODONE HYDROCHLORIDE 50 MG: 50 TABLET ORAL at 21:22

## 2023-07-08 RX ADMIN — DONEPEZIL HYDROCHLORIDE 10 MG: 5 TABLET ORAL at 21:22

## 2023-07-08 RX ADMIN — BACLOFEN 5 MG: 10 TABLET ORAL at 21:22

## 2023-07-08 RX ADMIN — PANTOPRAZOLE SODIUM 40 MG: 40 TABLET, DELAYED RELEASE ORAL at 09:03

## 2023-07-08 RX ADMIN — TAMSULOSIN HYDROCHLORIDE 0.4 MG: 0.4 CAPSULE ORAL at 16:40

## 2023-07-08 RX ADMIN — BACLOFEN 5 MG: 10 TABLET ORAL at 09:03

## 2023-07-08 RX ADMIN — HEPARIN SODIUM 5000 UNITS: 5000 INJECTION INTRAVENOUS; SUBCUTANEOUS at 21:22

## 2023-07-08 NOTE — ASSESSMENT & PLAN NOTE
History of multiple stroke  Hospitalized May for multifocal ischemic infarcts from distributions with bilateral carotid stenosis  -Underwent left ICA PTA with stenting on May 4  -Originally it was on Eliquis and aspirin and after recent stroke he was transitioned to aspirin and Brilinta  -Also on Pletal for previous diagnosis of peripheral vascular disease  -During recent hospitalization Eliquis was discontinued without plan to be resumed as neurosurgery who evaluated also the patient together with the neurology felt that strokes are less likely embolic in nature  -Vasculitis work-up was negative  -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 for which outpatient follow-up with neurosurgery was suggested  -He continues on Lipitor 40 mg/day  -He has aphasia and memory loss with ambulatory dysfunction and reported hemiplegia on the nondominant side as effect of the stroke  -Loop recorder replaced in 2019 for previous stroke and reported as negative however, there is a plan to insert the new loop recorder for the patient in the near future with cardiology  -He has completed ARC rehabilitation and was discharged home in the first week of June  -Since being home as per ER spoke with wife the patient has been unable to perform ADL and has experienced more difficulties with walking  -Today he had a fall and was brought to the emergency department as per advice of visiting nurse who felt that the patient will require higher level of care that wife can no longer provide  -CT head in the emergency department negative for acute finding, evolving left parietal stroke  -He appears to be at baseline with some aphasia/dysarthria some memory impairment  -Wife reports that the patient is impulsive at home, not witnessed in the emergency department however  -Resume all chronic medications  -Strict fall precautions  -Strict aspiration precaution  -PT/OT and  evaluation possible placement at least short-term  -Pt family reported change in vision, more lethargy, issues with balance PTA. Apprec neuro inpt. MRI brain d/w neuro - no new stroke, felt stable. Fortunately he appears improved today per his report. EEG reviewed with Dr. Kenneth Quintana no epileptiform activity no objection to discharge.

## 2023-07-08 NOTE — PROGRESS NOTES
[facility]  Progress Note  Name: Andry Parham  MRN: [id number]  Unit/Bed#: -01 I Date of Admission: 7/1/2023   Date of Service: 7/8/2023 I Hospital Day: 5    Assessment/Plan   Possible NPH (normal pressure hydrocephalus) ([address])  Assessment & Plan  He has established follow-up with neurosurgery, no need for further intervention while in the hospital  No Need for urgent lumbar puncture    Memory deficits  Assessment & Plan  Positive care and Namenda, monitor for sundowning, strict fall precautions    Type 2 diabetes mellitus ([address])  Assessment & Plan  Lab Results   Component Value Date    HGBA1C 5.3 07/02/2023       No results for input(s): "POCGLU" in the last 72 hours.     Blood Sugar Average: Last 72 hrs:       A1c 5.8  No concerns at this time  Not on any agent    Fall  Assessment & Plan  Please refer to principal    * Stroke [facility])  Assessment & Plan  History of multiple stroke  Hospitalized May for multifocal ischemic infarcts from distributions with bilateral carotid stenosis  -Underwent left ICA PTA with stenting on May 4  -Originally it was on Eliquis and aspirin and after recent stroke he was transitioned to aspirin and Brilinta  -Also on Pletal for previous diagnosis of peripheral vascular disease  -During recent hospitalization Eliquis was discontinued without plan to be resumed as neurosurgery who evaluated also the patient together with the neurology felt that strokes are less likely embolic in nature  -Vasculitis work-up was negative  -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 for which outpatient follow-up with neurosurgery was suggested  -He continues on Lipitor 40 mg/day  -He has aphasia and memory loss with ambulatory dysfunction and reported hemiplegia on the nondominant side as effect of the stroke  -Loop recorder replaced in 2019 for previous stroke and reported as negative however, there is a plan to insert the new loop recorder for the patient in the near future with cardiology  -He has completed ARC rehabilitation and was discharged home in the first week of   -Since being home as per ER spoke with wife the patient has been unable to perform ADL and has experienced more difficulties with walking  -Today he had a fall and was brought to the emergency department as per advice of visiting nurse who felt that the patient will require higher level of care that wife can no longer provide  -CT head in the emergency department negative for acute finding, evolving left parietal stroke  -He appears to be at baseline with some aphasia/dysarthria some memory impairment  -Wife reports that the patient is impulsive at home, not witnessed in the emergency department however  -Resume all chronic medications  -Strict fall precautions  -Strict aspiration precaution  -PT/OT and  evaluation possible placement at least short-term  -Pt family reported change in vision, more lethargy, issues with balance PTA. Apprec neuro inpt. MRI brain d/w neuro - no new stroke, felt stable. Fortunately he appears improved today per his report. EEG reviewed with Dr. Zeina Maloney no epileptiform activity no objection to discharge. VTE  Prophylaxis:   Pharmacologic: in place  Mechanical VTE Prophylaxis in Place: Yes    Patient Centered Rounds: I have performed bedside rounds with nursing staff today. Discussions with Specialists or Other Care Team Provider: case management    Education and Discussions with Family / Patient: pt sister      Current Length of Stay: 5 day(s)    Current Patient Status: Inpatient        Code Status: Level 1 - Full Code    Discharge Plan: Pt will require continued inpatient hospitalization. Subjective:   Pt without new complaints    Patient is seen and examined at bedside. All other ROS are negative.     Objective:     Vitals:   Temp (24hrs), Av.7 °F (36.5 °C), Min:97.7 °F (36.5 °C), Max:97.7 °F (36.5 °C)    Temp:  [97.7 °F (36.5 °C)] 97.7 °F (36.5 °C)  HR:  [47-89] 68  Resp:  [16-19] 19  BP: (105-151)/(59-81) 149/60  SpO2:  [96 %-98 %] 98 %  Body mass index is 29.76 kg/m². Input and Output Summary (last 24 hours): Intake/Output Summary (Last 24 hours) at 7/8/2023 1404  Last data filed at 7/8/2023 1308  Gross per 24 hour   Intake 240 ml   Output 900 ml   Net -660 ml       Physical Exam:       GEN: No acute distress, comfortable  HEEENT: No JVD, PERRLA, no scleral icterus  RESP: Lungs clear to auscultation bilaterally  CV: RRR, +s1/s2   ABD: SOFT NON TENDER, POSITIVE BOWEL SOUNDS, NO DISTENTION  PSYCH: CALM  NEURO: Mentation baseline, NO FOCAL DEFICITS  SKIN: NO RASH  EXTREM: NO EDEMA    Additional Data:     Labs:    Results from last 7 days   Lab Units 07/08/23  0347   WBC Thousand/uL 9.80   HEMOGLOBIN g/dL 12.7   HEMATOCRIT % 37.1   PLATELETS Thousands/uL 246   NEUTROS PCT % 52   LYMPHS PCT % 33   MONOS PCT % 9   EOS PCT % 5     Results from last 7 days   Lab Units 07/08/23  0347   SODIUM mmol/L 140   POTASSIUM mmol/L 3.1*   CHLORIDE mmol/L 109*   CO2 mmol/L 24   BUN mg/dL 19   CREATININE mg/dL 0.86   ANION GAP mmol/L 7   CALCIUM mg/dL 9.3   ALBUMIN g/dL 3.7   TOTAL BILIRUBIN mg/dL 0.60   ALK PHOS U/L 75   ALT U/L 14   AST U/L 14   GLUCOSE RANDOM mg/dL 95         Results from last 7 days   Lab Units 07/01/23  1627   POC GLUCOSE mg/dl 79     Results from last 7 days   Lab Units 07/02/23  0330   HEMOGLOBIN A1C % 5.3           Lines/Drains:  Invasive Devices     Peripheral Intravenous Line  Duration           Peripheral IV 07/05/23 Dorsal (posterior); Left Forearm 3 days                Telemetry:        * I Have Reviewed All Lab Data Listed Above. Imaging:     Results for orders placed during the hospital encounter of 04/16/23    XR chest 1 view portable    Narrative  CHEST    INDICATION:   Altered mental status. COMPARISON:  Chest radiograph 1/12/2023.     EXAM PERFORMED/VIEWS:  XR CHEST PORTABLE  AP semierect    FINDINGS:    Left hemithorax loop recorder. Cardiomediastinal silhouette appears unremarkable. The lungs are clear. No pneumothorax or pleural effusion. Osseous structures appear within normal limits for patient age. Impression  No acute cardiopulmonary disease. Workstation performed: [id number]    Results for orders placed during the hospital encounter of 04/26/23    XR chest pa & lateral    Narrative  CHEST    INDICATION:   RLL crackles r/o pneumonia. COMPARISON: 4/16/2023    EXAM PERFORMED/VIEWS:  XR CHEST PA & LATERAL      FINDINGS: Loop recorder is identified. Cardiomediastinal silhouette appears unremarkable. The lungs are clear. No pneumothorax or pleural effusion. Osseous structures appear within normal limits for patient age. Impression  No acute cardiopulmonary disease.             Workstation performed: [id number]      *I have reviewed all imaging reports listed above      Recent Cultures (last 7 days):           Last 24 Hours Medication List:   Current Facility-Administered Medications   Medication Dose Route Frequency Provider Last Rate   • acetaminophen  975 mg Oral TID PRN Francisco Hardin MD     • albuterol  1 puff Inhalation Q4H PRN Francisco Hardin MD     • aspirin  81 mg Oral Daily Francisco Hardin MD     • atorvastatin  40 mg Oral Daily With Breakfast Francisco Hardin MD     • baclofen  5 mg Oral Q12H Audra Ramirez PA-C     • cilostazol  100 mg Oral BID AC Francisco Hardin MD     • donepezil  10 mg Oral HS Audra Ramirez PA-C     • heparin (porcine)  5,000 Units Subcutaneous Q12H [address] Faizan Marie MD     • hydrALAZINE  5 mg Intravenous Q6H PRN Faizan Marie MD     • lidocaine  1 patch Topical Daily Audra Ramirez PA-C     • metoprolol succinate  100 mg Oral Daily Francisco Hardin MD     • pantoprazole  40 mg Oral Daily Francisco Hardin MD     • polyethylene glycol  17 g Oral QAM Francisco Hardin MD     • senna-docusate sodium 2 tablet Oral BID Courtney Jenkins MD     • tamsulosin  0.4 mg Oral Daily With Kathy Bunch MD     • ticagrelor  90 mg Oral Q12H [address] Courtney Jenkins MD     • traZODone  50 mg Oral HS Courtney Jenkins MD          Today, Patient Was Seen By: Georgie Hinojosa MD    ** Please Note: Dictation voice to text software may have been used in the creation of this document.  **

## 2023-07-09 LAB
ALBUMIN SERPL BCP-MCNC: 4 G/DL (ref 3.5–5)
ALP SERPL-CCNC: 85 U/L (ref 34–104)
ALT SERPL W P-5'-P-CCNC: 16 U/L (ref 7–52)
ANION GAP SERPL CALCULATED.3IONS-SCNC: 8 MMOL/L
AST SERPL W P-5'-P-CCNC: 13 U/L (ref 13–39)
BASOPHILS # BLD AUTO: 0.1 THOUSANDS/ÂΜL (ref 0–0.1)
BASOPHILS NFR BLD AUTO: 1 % (ref 0–1)
BILIRUB SERPL-MCNC: 0.72 MG/DL (ref 0.2–1)
BUN SERPL-MCNC: 20 MG/DL (ref 5–25)
CALCIUM SERPL-MCNC: 9.5 MG/DL (ref 8.4–10.2)
CHLORIDE SERPL-SCNC: 107 MMOL/L (ref 96–108)
CO2 SERPL-SCNC: 26 MMOL/L (ref 21–32)
CREAT SERPL-MCNC: 0.98 MG/DL (ref 0.6–1.3)
EOSINOPHIL # BLD AUTO: 0.53 THOUSAND/ÂΜL (ref 0–0.61)
EOSINOPHIL NFR BLD AUTO: 5 % (ref 0–6)
ERYTHROCYTE [DISTWIDTH] IN BLOOD BY AUTOMATED COUNT: 13.7 % (ref 11.6–15.1)
GFR SERPL CREATININE-BSD FRML MDRD: 81 ML/MIN/1.73SQ M
GLUCOSE SERPL-MCNC: 103 MG/DL (ref 65–140)
HCT VFR BLD AUTO: 39.2 % (ref 36.5–49.3)
HGB BLD-MCNC: 13.3 G/DL (ref 12–17)
IMM GRANULOCYTES # BLD AUTO: 0.03 THOUSAND/UL (ref 0–0.2)
IMM GRANULOCYTES NFR BLD AUTO: 0 % (ref 0–2)
LYMPHOCYTES # BLD AUTO: 2.81 THOUSANDS/ÂΜL (ref 0.6–4.47)
LYMPHOCYTES NFR BLD AUTO: 28 % (ref 14–44)
MCH RBC QN AUTO: 32.4 PG (ref 26.8–34.3)
MCHC RBC AUTO-ENTMCNC: 33.9 G/DL (ref 31.4–37.4)
MCV RBC AUTO: 96 FL (ref 82–98)
MONOCYTES # BLD AUTO: 0.9 THOUSAND/ÂΜL (ref 0.17–1.22)
MONOCYTES NFR BLD AUTO: 9 % (ref 4–12)
NEUTROPHILS # BLD AUTO: 5.54 THOUSANDS/ÂΜL (ref 1.85–7.62)
NEUTS SEG NFR BLD AUTO: 57 % (ref 43–75)
NRBC BLD AUTO-RTO: 0 /100 WBCS
PLATELET # BLD AUTO: 272 THOUSANDS/UL (ref 149–390)
PMV BLD AUTO: 10 FL (ref 8.9–12.7)
POTASSIUM SERPL-SCNC: 3.3 MMOL/L (ref 3.5–5.3)
PROT SERPL-MCNC: 6.6 G/DL (ref 6.4–8.4)
RBC # BLD AUTO: 4.1 MILLION/UL (ref 3.88–5.62)
SODIUM SERPL-SCNC: 141 MMOL/L (ref 135–147)
WBC # BLD AUTO: 9.91 THOUSAND/UL (ref 4.31–10.16)

## 2023-07-09 PROCEDURE — 80053 COMPREHEN METABOLIC PANEL: CPT | Performed by: HOSPITALIST

## 2023-07-09 PROCEDURE — 85025 COMPLETE CBC W/AUTO DIFF WBC: CPT | Performed by: HOSPITALIST

## 2023-07-09 PROCEDURE — 99231 SBSQ HOSP IP/OBS SF/LOW 25: CPT | Performed by: HOSPITALIST

## 2023-07-09 RX ORDER — POTASSIUM CHLORIDE 20 MEQ/1
40 TABLET, EXTENDED RELEASE ORAL DAILY
Status: DISCONTINUED | OUTPATIENT
Start: 2023-07-09 | End: 2023-07-12 | Stop reason: HOSPADM

## 2023-07-09 RX ORDER — LANOLIN ALCOHOL/MO/W.PET/CERES
400 CREAM (GRAM) TOPICAL 2 TIMES DAILY
Status: COMPLETED | OUTPATIENT
Start: 2023-07-09 | End: 2023-07-09

## 2023-07-09 RX ADMIN — HEPARIN SODIUM 5000 UNITS: 5000 INJECTION INTRAVENOUS; SUBCUTANEOUS at 21:19

## 2023-07-09 RX ADMIN — HEPARIN SODIUM 5000 UNITS: 5000 INJECTION INTRAVENOUS; SUBCUTANEOUS at 09:36

## 2023-07-09 RX ADMIN — PANTOPRAZOLE SODIUM 40 MG: 40 TABLET, DELAYED RELEASE ORAL at 09:33

## 2023-07-09 RX ADMIN — MAGNESIUM OXIDE TAB 400 MG (241.3 MG ELEMENTAL MG) 400 MG: 400 (241.3 MG) TAB at 09:43

## 2023-07-09 RX ADMIN — MAGNESIUM OXIDE TAB 400 MG (241.3 MG ELEMENTAL MG) 400 MG: 400 (241.3 MG) TAB at 17:55

## 2023-07-09 RX ADMIN — BACLOFEN 5 MG: 10 TABLET ORAL at 09:32

## 2023-07-09 RX ADMIN — ATORVASTATIN CALCIUM 40 MG: 40 TABLET, FILM COATED ORAL at 09:33

## 2023-07-09 RX ADMIN — METOPROLOL SUCCINATE 100 MG: 50 TABLET, EXTENDED RELEASE ORAL at 09:33

## 2023-07-09 RX ADMIN — POTASSIUM CHLORIDE 40 MEQ: 1500 TABLET, EXTENDED RELEASE ORAL at 09:43

## 2023-07-09 RX ADMIN — BACLOFEN 5 MG: 10 TABLET ORAL at 21:18

## 2023-07-09 RX ADMIN — LIDOCAINE 5% 1 PATCH: 700 PATCH TOPICAL at 09:33

## 2023-07-09 RX ADMIN — TRAZODONE HYDROCHLORIDE 50 MG: 50 TABLET ORAL at 21:19

## 2023-07-09 RX ADMIN — CILOSTAZOL 100 MG: 50 TABLET ORAL at 16:44

## 2023-07-09 RX ADMIN — ASPIRIN 81 MG: 81 TABLET, COATED ORAL at 09:32

## 2023-07-09 RX ADMIN — TAMSULOSIN HYDROCHLORIDE 0.4 MG: 0.4 CAPSULE ORAL at 16:44

## 2023-07-09 RX ADMIN — CILOSTAZOL 100 MG: 50 TABLET ORAL at 09:33

## 2023-07-09 RX ADMIN — TICAGRELOR 90 MG: 90 TABLET ORAL at 09:33

## 2023-07-09 RX ADMIN — DONEPEZIL HYDROCHLORIDE 10 MG: 5 TABLET ORAL at 21:19

## 2023-07-09 RX ADMIN — TICAGRELOR 90 MG: 90 TABLET ORAL at 21:18

## 2023-07-09 NOTE — PROGRESS NOTES
[facility]  Progress Note  Name: Miguel Saez  MRN: [id number]  Unit/Bed#: -01 I Date of Admission: 7/1/2023   Date of Service: 7/9/2023 I Hospital Day: 6    Assessment/Plan   Possible NPH (normal pressure hydrocephalus) ([address])  Assessment & Plan  He has established follow-up with neurosurgery, no need for further intervention while in the hospital  No Need for urgent lumbar puncture    Memory deficits  Assessment & Plan  Positive care and Namenda, monitor for sundowning, strict fall precautions    Type 2 diabetes mellitus ([address])  Assessment & Plan  Lab Results   Component Value Date    HGBA1C 5.3 07/02/2023       No results for input(s): "POCGLU" in the last 72 hours.     Blood Sugar Average: Last 72 hrs:       A1c 5.8  No concerns at this time  Not on any agent    Fall  Assessment & Plan  Please refer to principal    * Stroke [facility])  Assessment & Plan  History of multiple stroke  Hospitalized May for multifocal ischemic infarcts from distributions with bilateral carotid stenosis  -Underwent left ICA PTA with stenting on May 4  -Originally it was on Eliquis and aspirin and after recent stroke he was transitioned to aspirin and Brilinta  -Also on Pletal for previous diagnosis of peripheral vascular disease  -During recent hospitalization Eliquis was discontinued without plan to be resumed as neurosurgery who evaluated also the patient together with the neurology felt that strokes are less likely embolic in nature  -Vasculitis work-up was negative  -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 for which outpatient follow-up with neurosurgery was suggested  -He continues on Lipitor 40 mg/day  -He has aphasia and memory loss with ambulatory dysfunction and reported hemiplegia on the nondominant side as effect of the stroke  -Loop recorder replaced in 2019 for previous stroke and reported as negative however, there is a plan to insert the new loop recorder for the patient in the near future with cardiology  -He has completed ARC rehabilitation and was discharged home in the first week of   -Since being home as per ER spoke with wife the patient has been unable to perform ADL and has experienced more difficulties with walking  -Today he had a fall and was brought to the emergency department as per advice of visiting nurse who felt that the patient will require higher level of care that wife can no longer provide  -CT head in the emergency department negative for acute finding, evolving left parietal stroke  -He appears to be at baseline with some aphasia/dysarthria some memory impairment  -Wife reports that the patient is impulsive at home, not witnessed in the emergency department however  -Resume all chronic medications  -Strict fall precautions  -Strict aspiration precaution  -PT/OT and  evaluation possible placement at least short-term  -Pt family reported change in vision, more lethargy, issues with balance PTA. Apprec neuro inpt. MRI brain d/w neuro - no new stroke, felt stable. Fortunately he appears improved today per his report. EEG reviewed with Dr. Markel Dasilva no epileptiform activity no objection to discharge. VTE  Prophylaxis:   Pharmacologic: in place  Mechanical VTE Prophylaxis in Place: Yes    Patient Centered Rounds: I have performed bedside rounds with nursing staff today. Discussions with Specialists or Other Care Team Provider: case management    Education and Discussions with Family / Patient: pt      Current Length of Stay: 6 day(s)    Current Patient Status: Inpatient        Code Status: Level 1 - Full Code    Discharge Plan: Pt will require continued inpatient hospitalization. Subjective:   Pt awaiting insurance auth for rehab  No medical complaints    Patient is seen and examined at bedside. All other ROS are negative.     Objective:     Vitals:   Temp (24hrs), Av.7 °F (36.5 °C), Min:97.6 °F (36.4 °C), Max:97.7 °F (36.5 °C)    Temp:  [97.6 °F (36.4 °C)-97.7 °F (36.5 °C)] 97.6 °F (36.4 °C)  HR:  [61-91] 61  Resp:  [12-20] 12  BP: (139-151)/(60-76) 151/60  SpO2:  [96 %-99 %] 97 %  Body mass index is 30.17 kg/m². Input and Output Summary (last 24 hours): Intake/Output Summary (Last 24 hours) at 7/9/2023 1154  Last data filed at 7/9/2023 1044  Gross per 24 hour   Intake 840 ml   Output 275 ml   Net 565 ml       Physical Exam:       GEN: No acute distress, comfortable  HEEENT: No JVD, PERRLA, no scleral icterus  RESP: Lungs clear to auscultation bilaterally  CV: RRR, +s1/s2   ABD: SOFT NON TENDER, POSITIVE BOWEL SOUNDS, NO DISTENTION  PSYCH: CALM  NEURO: Mentation baseline, NO FOCAL DEFICITS  SKIN: NO RASH  EXTREM: NO EDEMA    Additional Data:     Labs:    Results from last 7 days   Lab Units 07/09/23  0446   WBC Thousand/uL 9.91   HEMOGLOBIN g/dL 13.3   HEMATOCRIT % 39.2   PLATELETS Thousands/uL 272   NEUTROS PCT % 57   LYMPHS PCT % 28   MONOS PCT % 9   EOS PCT % 5     Results from last 7 days   Lab Units 07/09/23  0446   SODIUM mmol/L 141   POTASSIUM mmol/L 3.3*   CHLORIDE mmol/L 107   CO2 mmol/L 26   BUN mg/dL 20   CREATININE mg/dL 0.98   ANION GAP mmol/L 8   CALCIUM mg/dL 9.5   ALBUMIN g/dL 4.0   TOTAL BILIRUBIN mg/dL 0.72   ALK PHOS U/L 85   ALT U/L 16   AST U/L 13   GLUCOSE RANDOM mg/dL 103                       Lines/Drains:  Invasive Devices     Peripheral Intravenous Line  Duration           Peripheral IV Right Antecubital -- days                Telemetry:        * I Have Reviewed All Lab Data Listed Above. Imaging:     Results for orders placed during the hospital encounter of 04/16/23    XR chest 1 view portable    Narrative  CHEST    INDICATION:   Altered mental status. COMPARISON:  Chest radiograph 1/12/2023. EXAM PERFORMED/VIEWS:  XR CHEST PORTABLE  AP semierect    FINDINGS:    Left hemithorax loop recorder. Cardiomediastinal silhouette appears unremarkable. The lungs are clear.   No pneumothorax or pleural effusion. Osseous structures appear within normal limits for patient age. Impression  No acute cardiopulmonary disease. Workstation performed: [id number]    Results for orders placed during the hospital encounter of 04/26/23    XR chest pa & lateral    Narrative  CHEST    INDICATION:   RLL crackles r/o pneumonia. COMPARISON: 4/16/2023    EXAM PERFORMED/VIEWS:  XR CHEST PA & LATERAL      FINDINGS: Loop recorder is identified. Cardiomediastinal silhouette appears unremarkable. The lungs are clear. No pneumothorax or pleural effusion. Osseous structures appear within normal limits for patient age. Impression  No acute cardiopulmonary disease.             Workstation performed: [id number]      *I have reviewed all imaging reports listed above      Recent Cultures (last 7 days):           Last 24 Hours Medication List:   Current Facility-Administered Medications   Medication Dose Route Frequency Provider Last Rate   • acetaminophen  975 mg Oral TID PRN David Zambrano MD     • albuterol  1 puff Inhalation Q4H PRN David Zambrano MD     • aspirin  81 mg Oral Daily David Zambrano MD     • atorvastatin  40 mg Oral Daily With Breakfast David Zambrano MD     • baclofen  5 mg Oral Q12H Katlyn Sultana PA-C     • cilostazol  100 mg Oral BID AC David Zambrano MD     • donepezil  10 mg Oral HS Katlyn Sultana PA-C     • heparin (porcine)  5,000 Units Subcutaneous Q12H [facility] Elvi Jon MD     • hydrALAZINE  5 mg Intravenous Q6H PRN Elvi Jon MD     • lidocaine  1 patch Topical Daily Katlyn Sultana PA-C     • magnesium Oxide  400 mg Oral BID Elvi Jon MD     • metoprolol succinate  100 mg Oral Daily David Zambrano MD     • pantoprazole  40 mg Oral Daily David Zambrano MD     • polyethylene glycol  17 g Oral QAM David Zambrano MD     • potassium chloride  40 mEq Oral Daily Elvi Jon MD     • senna-docusate sodium 2 tablet Oral BID Radha Olivas MD     • tamsulosin  0.4 mg Oral Daily With Andrew Koehler MD     • ticagrelor  90 mg Oral Q12H [facility] Radha Olivas MD     • traZODone  50 mg Oral HS Radha Olivas MD          Today, Patient Was Seen By: Meeta Youngblood MD    ** Please Note: Dictation voice to text software may have been used in the creation of this document.  **

## 2023-07-09 NOTE — PLAN OF CARE
Problem: PAIN - ADULT  Goal: Verbalizes/displays adequate comfort level or baseline comfort level  Description: Interventions:  - Encourage patient to monitor pain and request assistance  - Assess pain using appropriate pain scale  - Administer analgesics based on type and severity of pain and evaluate response  - Implement non-pharmacological measures as appropriate and evaluate response  - Consider cultural and social influences on pain and pain management  - Notify physician/advanced practitioner if interventions unsuccessful or patient reports new pain  Outcome: Progressing     Problem: INFECTION - ADULT  Goal: Absence or prevention of progression during hospitalization  Description: INTERVENTIONS:  - Assess and monitor for signs and symptoms of infection  - Monitor lab/diagnostic results  - Monitor all insertion sites, i.e. indwelling lines, tubes, and drains  - Administer medications as ordered  - Instruct and encourage patient and family to use good hand hygiene technique  - Identify and instruct in appropriate isolation precautions for identified infection/condition  Outcome: Progressing     Problem: DISCHARGE PLANNING  Goal: Discharge to home or other facility with appropriate resources  Description: INTERVENTIONS:  - Identify barriers to discharge w/patient and caregiver  - Arrange for needed discharge resources and transportation as appropriate  - Identify discharge learning needs (meds, wound care, etc.)  - Arrange for interpretive services to assist at discharge as needed  - Refer to Case Management Department for coordinating discharge planning if the patient needs post-hospital services based on physician/advanced practitioner order or complex needs related to functional status, cognitive ability, or social support system  Outcome: Progressing     Problem: Knowledge Deficit  Goal: Patient/family/caregiver demonstrates understanding of disease process, treatment plan, medications, and discharge instructions  Description: Complete learning assessment and assess knowledge base.   Interventions:  - Provide teaching at level of understanding  - Provide teaching via preferred learning methods  Outcome: Progressing

## 2023-07-09 NOTE — ASSESSMENT & PLAN NOTE
History of multiple stroke  Hospitalized May for multifocal ischemic infarcts from distributions with bilateral carotid stenosis  -Underwent left ICA PTA with stenting on May 4  -Originally it was on Eliquis and aspirin and after recent stroke he was transitioned to aspirin and Brilinta  -Also on Pletal for previous diagnosis of peripheral vascular disease  -During recent hospitalization Eliquis was discontinued without plan to be resumed as neurosurgery who evaluated also the patient together with the neurology felt that strokes are less likely embolic in nature  -Vasculitis work-up was negative  -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 for which outpatient follow-up with neurosurgery was suggested  -He continues on Lipitor 40 mg/day  -He has aphasia and memory loss with ambulatory dysfunction and reported hemiplegia on the nondominant side as effect of the stroke  -Loop recorder replaced in 2019 for previous stroke and reported as negative however, there is a plan to insert the new loop recorder for the patient in the near future with cardiology  -He has completed ARC rehabilitation and was discharged home in the first week of June  -Since being home as per ER spoke with wife the patient has been unable to perform ADL and has experienced more difficulties with walking  -Today he had a fall and was brought to the emergency department as per advice of visiting nurse who felt that the patient will require higher level of care that wife can no longer provide  -CT head in the emergency department negative for acute finding, evolving left parietal stroke  -He appears to be at baseline with some aphasia/dysarthria some memory impairment  -Wife reports that the patient is impulsive at home, not witnessed in the emergency department however  -Resume all chronic medications  -Strict fall precautions  -Strict aspiration precaution  -PT/OT and  evaluation possible placement at least short-term  -Pt family reported change in vision, more lethargy, issues with balance PTA. Apprec neuro inpt. MRI brain d/w neuro - no new stroke, felt stable. Fortunately he appears improved today per his report. EEG reviewed with Dr. Mayur Martel no epileptiform activity no objection to discharge.

## 2023-07-10 LAB
ALBUMIN SERPL BCP-MCNC: 3.8 G/DL (ref 3.5–5)
ALP SERPL-CCNC: 80 U/L (ref 34–104)
ALT SERPL W P-5'-P-CCNC: 15 U/L (ref 7–52)
ANION GAP SERPL CALCULATED.3IONS-SCNC: 7 MMOL/L
AST SERPL W P-5'-P-CCNC: 12 U/L (ref 13–39)
BASOPHILS # BLD AUTO: 0.11 THOUSANDS/ÂΜL (ref 0–0.1)
BASOPHILS NFR BLD AUTO: 1 % (ref 0–1)
BILIRUB SERPL-MCNC: 0.46 MG/DL (ref 0.2–1)
BUN SERPL-MCNC: 18 MG/DL (ref 5–25)
CALCIUM SERPL-MCNC: 9.2 MG/DL (ref 8.4–10.2)
CHLORIDE SERPL-SCNC: 108 MMOL/L (ref 96–108)
CO2 SERPL-SCNC: 24 MMOL/L (ref 21–32)
CREAT SERPL-MCNC: 0.95 MG/DL (ref 0.6–1.3)
EOSINOPHIL # BLD AUTO: 0.57 THOUSAND/ÂΜL (ref 0–0.61)
EOSINOPHIL NFR BLD AUTO: 6 % (ref 0–6)
ERYTHROCYTE [DISTWIDTH] IN BLOOD BY AUTOMATED COUNT: 13.9 % (ref 11.6–15.1)
GFR SERPL CREATININE-BSD FRML MDRD: 84 ML/MIN/1.73SQ M
GLUCOSE SERPL-MCNC: 108 MG/DL (ref 65–140)
HCT VFR BLD AUTO: 36.8 % (ref 36.5–49.3)
HGB BLD-MCNC: 12.3 G/DL (ref 12–17)
IMM GRANULOCYTES # BLD AUTO: 0.03 THOUSAND/UL (ref 0–0.2)
IMM GRANULOCYTES NFR BLD AUTO: 0 % (ref 0–2)
LYMPHOCYTES # BLD AUTO: 3.17 THOUSANDS/ÂΜL (ref 0.6–4.47)
LYMPHOCYTES NFR BLD AUTO: 32 % (ref 14–44)
MCH RBC QN AUTO: 32 PG (ref 26.8–34.3)
MCHC RBC AUTO-ENTMCNC: 33.4 G/DL (ref 31.4–37.4)
MCV RBC AUTO: 96 FL (ref 82–98)
MONOCYTES # BLD AUTO: 1.02 THOUSAND/ÂΜL (ref 0.17–1.22)
MONOCYTES NFR BLD AUTO: 10 % (ref 4–12)
NEUTROPHILS # BLD AUTO: 4.93 THOUSANDS/ÂΜL (ref 1.85–7.62)
NEUTS SEG NFR BLD AUTO: 51 % (ref 43–75)
NRBC BLD AUTO-RTO: 0 /100 WBCS
PLATELET # BLD AUTO: 247 THOUSANDS/UL (ref 149–390)
PMV BLD AUTO: 9.8 FL (ref 8.9–12.7)
POTASSIUM SERPL-SCNC: 3.5 MMOL/L (ref 3.5–5.3)
PROT SERPL-MCNC: 6.1 G/DL (ref 6.4–8.4)
RBC # BLD AUTO: 3.84 MILLION/UL (ref 3.88–5.62)
SODIUM SERPL-SCNC: 139 MMOL/L (ref 135–147)
WBC # BLD AUTO: 9.83 THOUSAND/UL (ref 4.31–10.16)

## 2023-07-10 PROCEDURE — 80053 COMPREHEN METABOLIC PANEL: CPT | Performed by: HOSPITALIST

## 2023-07-10 PROCEDURE — 85025 COMPLETE CBC W/AUTO DIFF WBC: CPT | Performed by: HOSPITALIST

## 2023-07-10 PROCEDURE — 99232 SBSQ HOSP IP/OBS MODERATE 35: CPT | Performed by: INTERNAL MEDICINE

## 2023-07-10 RX ADMIN — CILOSTAZOL 100 MG: 50 TABLET ORAL at 06:36

## 2023-07-10 RX ADMIN — TRAZODONE HYDROCHLORIDE 50 MG: 50 TABLET ORAL at 21:41

## 2023-07-10 RX ADMIN — TAMSULOSIN HYDROCHLORIDE 0.4 MG: 0.4 CAPSULE ORAL at 15:52

## 2023-07-10 RX ADMIN — ASPIRIN 81 MG: 81 TABLET, COATED ORAL at 09:11

## 2023-07-10 RX ADMIN — ATORVASTATIN CALCIUM 40 MG: 40 TABLET, FILM COATED ORAL at 09:10

## 2023-07-10 RX ADMIN — BACLOFEN 5 MG: 10 TABLET ORAL at 09:10

## 2023-07-10 RX ADMIN — LIDOCAINE 5% 1 PATCH: 700 PATCH TOPICAL at 09:08

## 2023-07-10 RX ADMIN — BACLOFEN 5 MG: 10 TABLET ORAL at 21:42

## 2023-07-10 RX ADMIN — TICAGRELOR 90 MG: 90 TABLET ORAL at 09:10

## 2023-07-10 RX ADMIN — HEPARIN SODIUM 5000 UNITS: 5000 INJECTION INTRAVENOUS; SUBCUTANEOUS at 09:13

## 2023-07-10 RX ADMIN — PANTOPRAZOLE SODIUM 40 MG: 40 TABLET, DELAYED RELEASE ORAL at 09:10

## 2023-07-10 RX ADMIN — METOPROLOL SUCCINATE 100 MG: 50 TABLET, EXTENDED RELEASE ORAL at 09:09

## 2023-07-10 RX ADMIN — TICAGRELOR 90 MG: 90 TABLET ORAL at 21:42

## 2023-07-10 RX ADMIN — DONEPEZIL HYDROCHLORIDE 10 MG: 5 TABLET ORAL at 21:43

## 2023-07-10 RX ADMIN — POTASSIUM CHLORIDE 40 MEQ: 1500 TABLET, EXTENDED RELEASE ORAL at 09:10

## 2023-07-10 RX ADMIN — CILOSTAZOL 100 MG: 50 TABLET ORAL at 15:51

## 2023-07-10 NOTE — CASE MANAGEMENT
Case Management Discharge Planning Note    Patient name Lara Dumont  Location /-48 MRN [id number]  : 1960 Date 7/10/2023       Current Admission Date: 2023  Current Admission Diagnosis:Stroke [facility])   Patient Active Problem List    Diagnosis Date Noted   • Aphasia 2023   • Atherosclerosis of native arteries of extremities with intermittent claudication, bilateral legs ([address]) 2023   • Benign prostatic hyperplasia with lower urinary tract symptoms 2023   • Possible NPH (normal pressure hydrocephalus) ([address]) 2023   • Muscle spasms of both lower extremities 2023   • Multiple thyroid nodules 2023   • Abnormal laboratory test 05/15/2023   • History of tobacco use 2023   • Chronic ischemic left MCA stroke 2023   • Hypokalemia 2023   • Infrarenal abdominal aortic aneurysm (AAA) without rupture ([address]) 2023   • Tachycardia 2023   • Prediabetes 2023   • SIRS (systemic inflammatory response syndrome) ([address]) 2023   • Chronic anticoagulation 2023   • Stroke ([address]) 2023   • Hyponatremia 2023   • Middle cerebral artery stenosis, right 2023   • Left posterior MCA stroke - etiology unclear at this time 2023   • Chronic low back pain 2023   • Hypertensive encephalopathy, transient 2023   • Snoring 08/10/2020   • Memory deficits 08/10/2020   • Chronic ischemic right MCA stroke 2019   • Status post placement of implantable loop recorder 2019   • Type 2 diabetes mellitus ([address]) 2019   • Anxiety and depression 2019   • Insomnia 2019   • Fall 2019   • 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 ([address]) 2019   • Urinary retention 2019   • At risk for venous thromboembolism (VTE) 2019   • Hypertriglyceridemia 2019   • Nicotine dependence 2019   • Bilateral carotid artery stenosis 2019   • Presbyopia 2019   • History of stroke 03/19/2019   • Headache 03/19/2019   • Primary hypertension 03/19/2019   • GERD (gastroesophageal reflux disease) 03/19/2019      LOS (days): 7  Geometric Mean LOS (GMLOS) (days):   Days to GMLOS:     OBJECTIVE:  Risk of Unplanned Readmission Score: 22.9         Current admission status: Inpatient   Preferred Pharmacy:   [facility] [address] VALDEZ AYALA [address] [place] [address] Road  [address] [facility] [address]  Phone: [phone] Fax: [phone] [address], [address] [address]  Phone: [phone] Fax: [phone]    Primary Care Provider: ERIC Kilgore    Primary Insurance: BLUE CROSS  Secondary Insurance: [facility] RE[facility]    DISCHARGE DETAILS:    Additional Comments: Benjamin Ceron is still pending. Informed Pt's wife and Donald Wiggins for insurance auth delay. Await insurance determination.

## 2023-07-10 NOTE — ASSESSMENT & PLAN NOTE
History of multiple stroke  Hospitalized May for multifocal ischemic infarcts from distributions with bilateral carotid stenosis  -Underwent left ICA PTA with stenting on May 4  -Originally it was on Eliquis and aspirin and after recent stroke he was transitioned to aspirin and Brilinta  -Also on Pletal for previous diagnosis of peripheral vascular disease  -During recent hospitalization Eliquis was discontinued without plan to be resumed as neurosurgery who evaluated also the patient together with the neurology felt that strokes are less likely embolic in nature  -Vasculitis work-up was negative  -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 for which outpatient follow-up with neurosurgery was suggested  -He continues on Lipitor 40 mg/day  -He has aphasia and memory loss with ambulatory dysfunction and reported hemiplegia on the nondominant side as effect of the stroke  -Loop recorder replaced in 2019 for previous stroke and reported as negative however, there is a plan to insert the new loop recorder for the patient in the near future with cardiology  -He has completed ARC rehabilitation and was discharged home in the first week of June  -Since being home patient has been unable to perform ADL and has experienced more difficulties with walking  -[address] admission, 7/1 he had a fall and was brought to the emergency department as per advice of visiting nurse who felt that the patient will require higher level of care that wife can no longer provide  -CT head in the emergency department negative for acute finding, evolving left parietal stroke  -He appears to be at baseline with some aphasia/dysarthria some memory impairment  -Wife reports that the patient is impulsive at home, not witnessed in the emergency department however  -Resume all chronic medications  -Strict fall precautions  -Strict aspiration precaution  -PT/OT and  evaluation possible placement at least short-term  -Pt family reported change in vision, more lethargy, issues with balance PTA. neuro was consulted and s/o MRI brain d/w neuro - no new stroke, felt stable. Fortunately he appears improved today per his report.  EEG reviewed with Dr. Pedro Boyer no epileptiform activity no objection to discharge.    -> awaiting insurance authorization for subsequent placement to rehab

## 2023-07-10 NOTE — CASE MANAGEMENT
Called [facility]. ([phone]) to check status of authorization. Spoke to Saira who stated faxes from 7/3 are just now being processed and cannot confirm fax was received at this time. CM notified.

## 2023-07-10 NOTE — UTILIZATION REVIEW
Continued Stay Review    Date: 7/8/23                        Current Patient Class: inpatient  Current Level of Care: med surg     HPI:63 y.o. male initially admitted on  7/1/23 to observation and converted to inpatient on 7/2/23 due to Possible NPH/Stroke/Memory deficits/Type 2 DM. History of multiple strokes, left ICA PTA with stent May 4, 2023. Completed rehab at [facility] and discharged first week of June. No need for LP for NPH, OP follow up with neurosurgery. Represented due to  Fall, unable to perform ADL, increased difficulty with walking. PT/OT recommend short term placement. Insurance [facility] is pending. Assessment/Plan:   7/8/23 mentation baseline. Has decreased foot clearance. Ambulatory status poor +. Awaiting insurance auth for rehab. Vital Signs:   07/08/23 19:56:05 97.7 °F (36.5 °C) 91 16 139/76 97 96 % -- --   07/08/23 15:52:20 97.7 °F (36.5 °C) 67 20 144/60 88 99 % -- --   07/08/23 0900 -- -- -- -- -- 98 % None (Room air) --   07/08/23 08:26:44 97.7 °F (36.5 °C) 68 19 149/60 90 98 %         Pertinent Labs/Diagnostic Results:   XR shoulder 2+ vw left   Final Result by Rodrick Gaitan MD (07/05 1132)      No acute osseous abnormality. Mild multifocal productive changes. Left rotator cuff tendinopathy. Workstation performed: [id number]         XR shoulder 2+ vw right   Final Result by Rodrick Gaitan MD (07/05 1132)      No acute osseous abnormality. Mild multifocal productive changes. Left rotator cuff tendinopathy. Workstation performed: [id number]         MRI brain wo contrast   Final Result by HAL Blood MD (07/02 1316)      Redemonstration of evolving left parietal/occipital infarct with residual but improving DWI signal that is now mostly normalized on ADC. No acute infarct or hemorrhage. Stable ventricular prominence, correlate for NPH.       Workstation performed: [id number]         CT head wo contrast   Final Result by Ambrosio Stratton MD (07/01 1548)      Evidence of maturing left occipital parietal infarct. There is increased encephalomalacia. No new hemorrhage. Ventriculomegaly is disproportionate and could represent normal pressure hydrocephalus.                   Workstation performed: [id number]           Results from last 7 days   Lab Units 07/10/23  0644 07/09/23  0446 07/08/23  0347 07/06/23  0457 07/05/23  0318   WBC Thousand/uL 9.83 9.91 9.80 10.63* 9.91   HEMOGLOBIN g/dL 12.3 13.3 12.7 12.4 12.5   HEMATOCRIT % 36.8 39.2 37.1 37.5 36.4*   PLATELETS Thousands/uL 247 272 246 249 258   NEUTROS ABS Thousands/µL 4.93 5.54 5.03 5.86 5.72     Results from last 7 days   Lab Units 07/10/23  0644 07/09/23  0446 07/08/23  0347 07/06/23  0457 07/05/23  0318   SODIUM mmol/L 139 141 140 140 139   POTASSIUM mmol/L 3.5 3.3* 3.1* 3.4* 3.4*   CHLORIDE mmol/L 108 107 109* 109* 109*   CO2 mmol/L 24 26 24 23 24   ANION GAP mmol/L 7 8 7 8 6   BUN mg/dL 18 20 19 24 25   CREATININE mg/dL 0.95 0.98 0.86 0.91 1.02   EGFR ml/min/1.73sq m 84 81 91 88 77   CALCIUM mg/dL 9.2 9.5 9.3 9.1 9.2     Results from last 7 days   Lab Units 07/10/23  0644 07/09/23  0446 07/08/23  0347 07/06/23  0457 07/05/23  0318   AST U/L 12* 13 14 13 12*   ALT U/L 15 16 14 12 12   ALK PHOS U/L 80 85 75 74 76   TOTAL PROTEIN g/dL 6.1* 6.6 6.2* 6.1* 6.0*   ALBUMIN g/dL 3.8 4.0 3.7 3.7 3.7   TOTAL BILIRUBIN mg/dL 0.46 0.72 0.60 0.51 0.56     Results from last 7 days   Lab Units 07/10/23  0644 07/09/23  0446 07/08/23  0347 07/06/23  0457 07/05/23  0318   GLUCOSE RANDOM mg/dL 108 103 95 94 127       Medications:   Scheduled Medications:  aspirin, 81 mg, Oral, Daily  atorvastatin, 40 mg, Oral, Daily With Breakfast  baclofen, 5 mg, Oral, Q12H  cilostazol, 100 mg, Oral, BID AC  donepezil, 10 mg, Oral, HS  heparin (porcine), 5,000 Units, Subcutaneous, Q12H JOSE  lidocaine, 1 patch, Topical, Daily  metoprolol succinate, 100 mg, Oral, Daily  pantoprazole, 40 mg, Oral, Daily  polyethylene glycol, 17 g, Oral, QAM  potassium chloride, 40 mEq, Oral, Daily  senna-docusate sodium, 2 tablet, Oral, BID  tamsulosin, 0.4 mg, Oral, Daily With Dinner  ticagrelor, 90 mg, Oral, Q12H JOSE  traZODone, 50 mg, Oral, HS      Continuous IV Infusions:     PRN Meds: not used. acetaminophen, 975 mg, Oral, TID PRN  albuterol, 1 puff, Inhalation, Q4H PRN  hydrALAZINE, 5 mg, Intravenous, Q6H PRN        Discharge Plan: to be determined     Network Utilization Review Department  ATTENTION: Please call with any questions or concerns to [phone] and carefully listen to the prompts so that you are directed to the right person. All voicemails are confidential.  Alf Jean all requests for admission clinical reviews, approved or denied determinations and any other requests to dedicated fax number below belonging to the campus where the patient is receiving treatment.  List of dedicated fax numbers for the Facilities:  [place] DENIALS (Administrative/Medical Necessity) [phone] [address] [facility] (Maternity/NICU/Pediatrics) [phone]   [address] [place] [address] Drive [phone]   [facility] [phone]   [address] [place] [address] [address] [facility] [address] [phone]   [address] [address] [phone]

## 2023-07-10 NOTE — PROGRESS NOTES
[address] [facility]  Progress Note  Name: Nikole Kelly  MRN: [id number]  Unit/Bed#: -01 I Date of Admission: 7/1/2023   Date of Service: 7/10/2023 I Hospital Day: 7      Assessment & Plan:    * Stroke [facility])  Assessment & Plan  History of multiple stroke  Hospitalized May for multifocal ischemic infarcts from distributions with bilateral carotid stenosis  -Underwent left ICA PTA with stenting on May 4  -Originally it was on Eliquis and aspirin and after recent stroke he was transitioned to aspirin and Brilinta  -Also on Pletal for previous diagnosis of peripheral vascular disease  -During recent hospitalization Eliquis was discontinued without plan to be resumed as neurosurgery who evaluated also the patient together with the neurology felt that strokes are less likely embolic in nature  -Vasculitis work-up was negative  -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 for which outpatient follow-up with neurosurgery was suggested  -He continues on Lipitor 40 mg/day  -He has aphasia and memory loss with ambulatory dysfunction and reported hemiplegia on the nondominant side as effect of the stroke  -Loop recorder replaced in 2019 for previous stroke and reported as negative however, there is a plan to insert the new loop recorder for the patient in the near future with cardiology  -He has completed ARC rehabilitation and was discharged home in the first week of June  -Since being home patient has been unable to perform ADL and has experienced more difficulties with walking  -[address] admission, 7/1 he had a fall and was brought to the emergency department as per advice of visiting nurse who felt that the patient will require higher level of care that wife can no longer provide  -CT head in the emergency department negative for acute finding, evolving left parietal stroke  -He appears to be at baseline with some aphasia/dysarthria some memory impairment  -Wife reports that the patient is impulsive at home, not witnessed in the emergency department however  -Resume all chronic medications  -Strict fall precautions  -Strict aspiration precaution  -PT/OT and  evaluation possible placement at least short-term  -Pt family reported change in vision, more lethargy, issues with balance PTA. neuro was consulted and s/o MRI brain d/w neuro - no new stroke, felt stable. Fortunately he appears improved today per his report. EEG reviewed with Dr. Trisha Gruber no epileptiform activity no objection to discharge.    -> awaiting insurance authorization for subsequent placement to rehab    Possible NPH (normal pressure hydrocephalus) ([address])  Assessment & Plan  He has established follow-up with neurosurgery, no need for further intervention while in the hospital  No Need for urgent lumbar puncture    Memory deficits  Assessment & Plan  Donepezil and Namenda, monitor for sundowning, strict fall precautions    Type 2 diabetes mellitus ([address])  Assessment & Plan  Lab Results   Component Value Date    HGBA1C 5.3 07/02/2023       No results for input(s): "POCGLU" in the last 72 hours. Blood Sugar Average: Last 72 hrs:       A1c 5.8  No concerns at this time  Not on any agent    Fall  Assessment & Plan  Please refer to principal      DVT Prophylaxis:  Heparin SC    Patient Centered Rounds:  I have performed bedside rounds and discussed plan of care with nursing today. Discussions with Specialists or Other Care Team Provider:  see above assessments if applicable    Education and Discussions with Family / Patient:  Patient at bedside - denied need to update other contacts today    Time Spent for Care:  32 minutes.  More than 50% of total time spent on counseling and coordination of care, on one or more of the following: performing physical exam; counseling and coordination of care, obtaining or reviewing history, documenting in the medical record, reviewing/ordering tests/medications/procedures, and communicating with other healthcare professionals. Current Length of Stay: 7 day(s)  Current Patient Status: Inpatient   Certification Statement:  Patient will continue to require additional hospital stay due to assessments as noted above. Code Status: Level 1 - Full Code        Subjective:     Encountered earlier in the day. Sitting upright in a chair consuming a meal.  No new complaints this time. Overall, he remains in hopeful spirits. Objective:     Vitals:   Temp (24hrs), Av.1 °F (36.7 °C), Min:97.7 °F (36.5 °C), Max:98.4 °F (36.9 °C)    Temp:  [97.7 °F (36.5 °C)-98.4 °F (36.9 °C)] 97.7 °F (36.5 °C)  HR:  [69-90] 79  Resp:  [16-18] 16  BP: (119-130)/(57-71) 130/66  SpO2:  [95 %-98 %] 95 %  Body mass index is 30.32 kg/m². Input and Output Summary (last 24 hours):        Intake/Output Summary (Last 24 hours) at 7/10/2023 1320  Last data filed at 7/10/2023 1230  Gross per 24 hour   Intake 740 ml   Output 460 ml   Net 280 ml       Physical Exam:     GENERAL   Well-developed/nourished - no immediate distress at rest   HEAD   Normocephalic - atraumatic   EYES   PERRL - EOMI    MOUTH   Mucosa moist   NECK   Supple - full range of motion   CARDIAC   Regular rate/rhythm - S1/S2 positive   PULMONARY    Clear breath sounds bilaterally - nonlabored respirations   ABDOMEN   Soft - nontender/nondistended - active bowel sounds   MUSCULOSKELETAL   Motor strength/range of motion fairly intact   NEUROLOGIC   Alert/oriented at baseline   SKIN   Chronic wrinkles/blemishes    PSYCHIATRIC   Mood/affect stable         Additional Data:     Labs & Recent Cultures:    Results from last 7 days   Lab Units 07/10/23  0644   WBC Thousand/uL 9.83   HEMOGLOBIN g/dL 12.3   HEMATOCRIT % 36.8   PLATELETS Thousands/uL 247   NEUTROS PCT % 51   LYMPHS PCT % 32   MONOS PCT % 10   EOS PCT % 6     Results from last 7 days   Lab Units 07/10/23  0644   POTASSIUM mmol/L 3.5   CHLORIDE mmol/L 108   CO2 mmol/L 24   BUN mg/dL 18   CREATININE mg/dL 0.95   CALCIUM mg/dL 9. 2   ALK PHOS U/L 80   ALT U/L 15   AST U/L 12*                                 Lines/Drains:  Invasive Devices     Peripheral Intravenous Line  Duration           Peripheral IV 07/09/23 Right Antecubital 1 day                  Last 24 Hours Medication List:   Current Facility-Administered Medications   Medication Dose Route Frequency Provider Last Rate   • acetaminophen  975 mg Oral TID PRN Courtney Jenkins MD     • albuterol  1 puff Inhalation Q4H PRN Courtney Jenkins MD     • aspirin  81 mg Oral Daily Courtney Jenkins MD     • atorvastatin  40 mg Oral Daily With Breakfast Courtney Jenkins MD     • baclofen  5 mg Oral Q12H Karla Harris PA-C     • cilostazol  100 mg Oral BID AC Courtney Jenkins MD     • donepezil  10 mg Oral HS Karla Harris PA-C     • heparin (porcine)  5,000 Units Subcutaneous Q12H [facility] HOME Georgie Hinojosa MD     • hydrALAZINE  5 mg Intravenous Q6H PRN Georgie Hinojosa MD     • lidocaine  1 patch Topical Daily Karla Harris PA-C     • metoprolol succinate  100 mg Oral Daily Courtney Jenkins MD     • pantoprazole  40 mg Oral Daily Courtney Jenkins MD     • polyethylene glycol  17 g Oral QAM Courtney Jenkins MD     • potassium chloride  40 mEq Oral Daily Georgie Hinojosa MD     • senna-docusate sodium  2 tablet Oral BID Courtney Jenkins MD     • tamsulosin  0.4 mg Oral Daily With Kathy Bunch MD     • ticagrelor  90 mg Oral Q12H [facility] HOME Courtney Jenkins MD     • traZODone  50 mg Oral HS Courtney Jenkins MD                      ** Please Note: This note is constructed using a voice recognition dictation system. An occasional wrong word/phrase or “sound-a-like” substitution may have been picked up by dictation device due to the inherent limitations of voice recognition software. Read the chart carefully and recognize, using reasonable context, where substitutions may have occurred. **

## 2023-07-11 PROCEDURE — 99233 SBSQ HOSP IP/OBS HIGH 50: CPT | Performed by: STUDENT IN AN ORGANIZED HEALTH CARE EDUCATION/TRAINING PROGRAM

## 2023-07-11 PROCEDURE — 97535 SELF CARE MNGMENT TRAINING: CPT

## 2023-07-11 PROCEDURE — 97116 GAIT TRAINING THERAPY: CPT

## 2023-07-11 RX ADMIN — TRAZODONE HYDROCHLORIDE 50 MG: 50 TABLET ORAL at 21:53

## 2023-07-11 RX ADMIN — CILOSTAZOL 100 MG: 50 TABLET ORAL at 15:53

## 2023-07-11 RX ADMIN — BACLOFEN 5 MG: 10 TABLET ORAL at 08:33

## 2023-07-11 RX ADMIN — ATORVASTATIN CALCIUM 40 MG: 40 TABLET, FILM COATED ORAL at 08:35

## 2023-07-11 RX ADMIN — PANTOPRAZOLE SODIUM 40 MG: 40 TABLET, DELAYED RELEASE ORAL at 08:35

## 2023-07-11 RX ADMIN — CILOSTAZOL 100 MG: 50 TABLET ORAL at 08:33

## 2023-07-11 RX ADMIN — ASPIRIN 81 MG: 81 TABLET, COATED ORAL at 08:33

## 2023-07-11 RX ADMIN — LIDOCAINE 5% 1 PATCH: 700 PATCH TOPICAL at 08:30

## 2023-07-11 RX ADMIN — METOPROLOL SUCCINATE 100 MG: 50 TABLET, EXTENDED RELEASE ORAL at 08:34

## 2023-07-11 RX ADMIN — TAMSULOSIN HYDROCHLORIDE 0.4 MG: 0.4 CAPSULE ORAL at 15:53

## 2023-07-11 RX ADMIN — BACLOFEN 5 MG: 10 TABLET ORAL at 20:27

## 2023-07-11 RX ADMIN — TICAGRELOR 90 MG: 90 TABLET ORAL at 20:27

## 2023-07-11 RX ADMIN — DONEPEZIL HYDROCHLORIDE 10 MG: 5 TABLET ORAL at 21:53

## 2023-07-11 RX ADMIN — HEPARIN SODIUM 5000 UNITS: 5000 INJECTION INTRAVENOUS; SUBCUTANEOUS at 08:42

## 2023-07-11 RX ADMIN — HEPARIN SODIUM 5000 UNITS: 5000 INJECTION INTRAVENOUS; SUBCUTANEOUS at 20:27

## 2023-07-11 RX ADMIN — POTASSIUM CHLORIDE 40 MEQ: 1500 TABLET, EXTENDED RELEASE ORAL at 08:33

## 2023-07-11 RX ADMIN — TICAGRELOR 90 MG: 90 TABLET ORAL at 08:39

## 2023-07-11 NOTE — PLAN OF CARE
Problem: OCCUPATIONAL THERAPY ADULT  Goal: Performs self-care activities at highest level of function for planned discharge setting. See evaluation for individualized goals. Description: Treatment Interventions: ADL retraining, Functional transfer training, Patient/family training, Compensatory technique education, Cognitive reorientation          See flowsheet documentation for full assessment, interventions and recommendations. Outcome: Progressing  Note: Limitation: Decreased ADL status, Decreased self-care trans, Decreased high-level ADLs, Decreased cognition, Decreased fine motor control  Prognosis: Fair  Assessment: Pt seen for OT tx session with focus on functional balance, functional mobility, ADL status, and transfer safety. Patient agreeable to OT treatment session. Pt received seated OOB to Recliner. Pt performed all functional txfers with S. Pt completed functional mobility for greater than a household distance with S & RW. Pt able to doff BL socks with S & encouragement, required Min A to don BL socks. Pt has improved functionally since OT IE. Patient continues to be functioning below baseline level, occupational performance remains limited secondary to factors listed above, and pt at increased risk for falls and injury. The patient's raw score on the AM-PAC Daily Activity inpatient short form is 19, standardized score is 40.22, greater than 39.4. Patients at this level are likely to benefit from DC to home. Please refer to the recommendation of the Occupational Therapist for safe DC planning. From OT standpoint, recommendation at time of D/C would be DC with Level III resources. Patient to benefit from continued Occupational Therapy treatment while in the hospital to address deficits as defined above and maximize level of functional independence with ADLs and functional mobility.  Pt left seated OOB to Recliner with call bell in reach, tray table in reach, needs met, chair alarm activated, RN informed.

## 2023-07-11 NOTE — PLAN OF CARE
Problem: PHYSICAL THERAPY ADULT  Goal: Performs mobility at highest level of function for planned discharge setting. See evaluation for individualized goals. Description: Treatment/Interventions: Functional transfer training, LE strengthening/ROM, Elevations, Therapeutic exercise, Endurance training, Cognitive reorientation, Patient/family training, Equipment eval/education, Bed mobility, Gait training  Equipment Recommended: Barrie Members       See flowsheet documentation for full assessment, interventions and recommendations. Note:    Problem List: Decreased strength, Impaired balance, Decreased endurance, Decreased mobility, Decreased safety awareness, Impaired judgement, Decreased cognition  Assessment: Pt seen for PT intervention. Pt ambulating similarly to evaluation. He is Supervision for functional mobility. Pt does have a small LOB w/ turning/changing of directions that he has a small LOB that he benefits from hands-on to maintain. He also benefits from cueing at times for RW navigation. Pt pleasant and cooperative, has been ringing for RN appropriate to get OOB/use bathroom. During this admission, pt would benefit from continued skilled inpatient PT in the acute care setting in order to address the abovementioned deficits to maximize function and mobility before DC from acute care. Barriers to Discharge: (S) Decreased caregiver support          See flowsheet documentation for full assessment.

## 2023-07-11 NOTE — DISCHARGE SUMMARY
[facility]  Discharge- Sharon Blake 1960, 58 y.o. male MRN: [id number]  Unit/Bed#: -01 Encounter: [id number]  Primary Care Provider: Sheryle Mart, CRNP   Date and time admitted to hospital: 7/1/2023  1:16 PM    * Stroke [facility])  Assessment & Plan  History of multiple stroke  Hospitalized May for multifocal ischemic infarcts from distributions with bilateral carotid stenosis  -Underwent left ICA PTA with stenting on May 4  -Originally it was on Eliquis and aspirin and after recent stroke he was transitioned to aspirin and Brilinta  -Also on Pletal for previous diagnosis of peripheral vascular disease  -During recent hospitalization Eliquis was discontinued without plan to be resumed as neurosurgery who evaluated also the patient together with the neurology felt that strokes are less likely embolic in nature  -Vasculitis work-up was negative  -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 for which outpatient follow-up with neurosurgery was suggested  -He continues on Lipitor 40 mg/day  -He has aphasia and memory loss with ambulatory dysfunction and reported hemiplegia on the nondominant side as effect of the stroke  -Loop recorder replaced in 2019 for previous stroke and reported as negative however, there is a plan to insert the new loop recorder for the patient in the near future with cardiology  -He has completed ARC rehabilitation and was discharged home in the first week of June  -Since being home patient has been unable to perform ADL and has experienced more difficulties with walking  -[address] admission, 7/1 he had a fall and was brought to the emergency department as per advice of visiting nurse who felt that the patient will require higher level of care that wife can no longer provide  -CT head in the emergency department negative for acute finding, evolving left parietal stroke  -He appears to be at baseline with some aphasia/dysarthria some memory impairment  -Wife reports that the patient is impulsive at home, not witnessed in the emergency department however  -Resume all chronic medications  -Strict fall precautions  -Strict aspiration precaution  -PT/OT and  evaluation possible placement at least short-term  -Pt family reported change in vision, more lethargy, issues with balance PTA. neuro was consulted and s/o MRI brain d/w neuro - no new stroke, felt stable. Fortunately he appears improved today per his report. EEG reviewed with Dr. Pedro Boyer no epileptiform activity no objection to discharge. To go to rehab at [facility]     Possible NPH (normal pressure hydrocephalus) [facility])  Assessment & Plan  He has established follow-up with neurosurgery, no need for further intervention while in the hospital  No Need for urgent lumbar puncture    Memory deficits  Assessment & Plan  Donepezil and Namenda, monitor for sundowning, strict fall precautions    Type 2 diabetes mellitus ([address])  Assessment & Plan  Lab Results   Component Value Date    HGBA1C 5.3 07/02/2023       No results for input(s): "POCGLU" in the last 72 hours. Blood Sugar Average: Last 72 hrs:       A1c 5.8  No concerns at this time  Not on any agent    Fall  Assessment & Plan  Please refer to principal    Medical Problems     Resolved Problems  Date Reviewed: 7/9/2023   None       Discharging Physician / Practitioner: Srinivas Mcgovern MD  PCP: Clide Bosworth, [place]  Admission Date:   Admission Orders (From admission, onward)     Ordered        07/03/23 1351  Inpatient Admission  Once            07/01/23 1530  Place in Observation  Once                      Discharge Date: 07/12/23    Consultations During Hospital Stay:  · Neurology  · Case management  · PT  · OT  · Nutrition    Procedures Performed:   XR shoulder 2+ vw left   Final Result      No acute osseous abnormality. Mild multifocal productive changes. Left rotator cuff tendinopathy.       Workstation performed: [id number]         XR shoulder 2+ vw right   Final Result      No acute osseous abnormality. Mild multifocal productive changes. Left rotator cuff tendinopathy. Workstation performed: [id number]         MRI brain wo contrast   Final Result      Redemonstration of evolving left parietal/occipital infarct with residual but improving DWI signal that is now mostly normalized on ADC. No acute infarct or hemorrhage. Stable ventricular prominence, correlate for NPH. Workstation performed: [id number]         CT head wo contrast   Final Result      Evidence of maturing left occipital parietal infarct. There is increased encephalomalacia. No new hemorrhage. Ventriculomegaly is disproportionate and could represent normal pressure hydrocephalus. Workstation performed: [id number]           ·     Significant Findings / Test Results:   · See above    Incidental Findings:   · See above   · I reviewed the above mentioned incidental findings with the patient and/or family and they expressed understanding. Test Results Pending at Discharge (will require follow up):   · none     Outpatient Tests Requested:  · none    Complications:  None known at this time    Reason for Admission: CVA that led to a fall    Hospital Course:   Taylor Pettit is a 58 y.o. male patient who originally presented to the hospital on 7/1/2023 due to inability to perform ADLs status post CVA and possible NPH diagnosis despite acute rehab. In the ED imaging was negative for any acute pathology. EEG was done and showed no epileptiform activity. PT OT evaluated the patient and recommended rehab. He will be going to [facility] for rehab. He is otherwise remained hemodynamically stable afebrile in no acute respiratory distress. Please see above list of diagnoses and related plan for additional information.      Condition at Discharge: stable    Discharge Day Visit / Exam:   Subjective: See progress note  Vitals: Blood Pressure: 146/85 (07/12/23 0708)  Pulse: 68 (07/12/23 0708)  Temperature: (!) 97.4 °F (36.3 °C) (07/12/23 0708)  Temp Source: Oral (07/11/23 2010)  Respirations: 14 (07/12/23 0708)  Height: 5' 8" (172.7 cm) (07/01/23 1700)  Weight - Scale: 90 kg (198 lb 6.6 oz) (07/12/23 0509)  SpO2: 97 % (07/12/23 0708)  Exam:   Physical Exam see progress note    Discussion with Family: Patient declined call to . Discharge instructions/Information to patient and family:   See after visit summary for information provided to patient and family. Provisions for Follow-Up Care:  See after visit summary for information related to follow-up care and any pertinent home health orders. Disposition:   Acute Rehab at [facility] Readmission: no     Discharge Statement:  I spent 35 minutes discharging the patient. This time was spent on the day of discharge. I had direct contact with the patient on the day of discharge. Greater than 50% of the total time was spent examining patient, answering all patient questions, arranging and discussing plan of care with patient as well as directly providing post-discharge instructions. Additional time then spent on discharge activities. Discharge Medications:  See after visit summary for reconciled discharge medications provided to patient and/or family.       **Please Note: This note may have been constructed using a voice recognition system**

## 2023-07-11 NOTE — PHYSICAL THERAPY NOTE
PHYSICAL THERAPY TREATMENT NOTE    Patient Name: Alta Aviles  GYWPV'Y Date: 7/11/2023 07/11/23 1500   Note Type   Note Type Treatment for insurance authorization   Pain Assessment   Pain Assessment Tool 0-10   Pain Score No Pain   Restrictions/Precautions   Weight Bearing Precautions Per Order No   Other Precautions Cognitive; Chair Alarm; Bed Alarm; Fall Risk   General   Chart Reviewed Yes   Family/Caregiver Present No   Cognition   Overall Cognitive Status Impaired   Arousal/Participation Alert   Attention Attends with cues to redirect   Orientation Level Oriented to person;Oriented to place; Disoriented to time;Oriented to situation   Memory Decreased recall of recent events   Following Commands Follows one step commands with increased time or repetition   Comments Pt w/ expressive aphasia   Bed Mobility   Supine to Sit Unable to assess   Transfers   Sit to Stand 5  Supervision   Additional items Assist x 1; Increased time required   Stand to Sit 5  Supervision   Additional items Assist x 1; Increased time required   Ambulation/Elevation   Gait pattern Decreased foot clearance; Short stride   Gait Assistance   (S --> Min A x 1 w/ turns)   Additional items Assist x 1;Verbal cues   Assistive Device Rolling walker   Distance 100 ft   Ambulation/Elevation Additional Comments Pt w/ small, retropulsive LOB during turn that benefits from steadying A from therapist to maintain   Balance   Static Sitting Fair +   Static Standing Fair -   Ambulatory Poor +  (w /RW)   Activity Tolerance   Activity Tolerance Patient tolerated treatment well   Medical Staff Made Aware CM Christiano Reasons, [address]   Nurse Made Aware GARRETT Major   Assessment   Problem List Decreased strength; Impaired balance;Decreased endurance;Decreased mobility; Decreased safety awareness; Impaired judgement;Decreased cognition   Assessment Pt seen for PT intervention.  Pt ambulating similarly to evaluation. He is Supervision for functional mobility. Pt does have a small LOB w/ turning/changing of directions that he has a small LOB that he benefits from hands-on to maintain. He also benefits from cueing at times for RW navigation. Pt pleasant and cooperative, has been ringing for RN appropriate to get OOB/use bathroom. During this admission, pt would benefit from continued skilled inpatient PT in the acute care setting in order to address the abovementioned deficits to maximize function and mobility before DC from acute care. Goals   Patient Goals none stated   [facility] Expiration Date 07/15/23   Short Term Goal #1 Patient will: Perform all bed mobility tasks modified independent to improve pt's independence w/ repositioning for decrease risk of skin breakdown, Perform all transfers modified independent consistently from various height surfaces in order to improve I w/ engagement w/ real-world environments/situations, Ambulate at least 150 ft. with roller walker modified independent w/o LOB to facilitate return and engagement w/ previous living environment and Navigate 1 stairs w/ supervision without handrail to either improve independence w/ entering home and/or so patient can fully access living areas in home   PT Treatment Day 1   Plan   Treatment/Interventions Functional transfer training;LE strengthening/ROM; Elevations; Therapeutic exercise; Endurance training;Patient/family training;Equipment eval/education; Bed mobility;Gait training;Cognitive reorientation   PT Frequency 2-3x/wk   Recommendation   UB Rehab Discharge Recommendation (PT/OT) (S)  Level 2  (vs Level 3 pending level of family support)   Equipment Recommended Walker   Additional Comments Pt continues to requires supervision/A for safety w/ mobilty, and family continues to report that are unable provide.  However if pt continues to progress mobility-wise, would moreso benefit from Level 3 resources   AM-PAC Basic Mobility Inpatient   Turning in Flat Bed Without Bedrails 3   Lying on Back to Sitting on Edge of Flat Bed Without Bedrails 3   Moving Bed to Chair 3   Standing Up From Chair Using Arms 3   Walk in Room 3   Climb 3-5 Stairs With Railing 2   Basic Mobility Inpatient Raw Score 17   Basic Mobility Standardized Score 39.67   Highest Level Of Mobility   -[facility] Goal 5: Stand one or more mins   JH-HLM Achieved 7: Walk 25 feet or more   Education   Education Provided Mobility training;Assistive device   Patient Reinforcement needed   End of Consult   Patient Position at End of Consult Bedside chair;Bed/Chair alarm activated; All needs within reach       The patient's AM-PAC Basic Mobility Inpatient Short Form Raw Score is 17. A Raw score of greater than 16 suggests the patient may benefit from discharge to home. Please also refer to the recommendation of the Physical Therapist for safe discharge planning. Pt would continue to benefit from skilled PT during this admission in order to progress patient towards goals to decrease risk of falls and maximize independence.      Nik Medina, PT, DPT

## 2023-07-11 NOTE — CASE MANAGEMENT
CM Director Son Roger looked in April 2023 stay at [facility], saw [address] request was submitted to secondary insurance [employer] and approved. Advised to submit to [employer]. Request submitted to [address] [place] [address] received request for authorization from Care Manager. Authorization request for: SNF  Facility Name: [facility]. NPI: [id number]  Facility MD: Dr Dieudonne Abad. NPI: [id number]  Authorization initiated by contacting insurance: Human[employer]Support[employer]istant Via: Av[employer]ity    Pending Reference #: [id number]   Clinicals submitted via: Availity attachment   CM notified if Wiren Board [facility] [employer] will process request they may request updated PT notes.

## 2023-07-11 NOTE — ASSESSMENT & PLAN NOTE
History of multiple stroke  Hospitalized May for multifocal ischemic infarcts from distributions with bilateral carotid stenosis  -Underwent left ICA PTA with stenting on May 4  -Originally it was on Eliquis and aspirin and after recent stroke he was transitioned to aspirin and Brilinta  -Also on Pletal for previous diagnosis of peripheral vascular disease  -During recent hospitalization Eliquis was discontinued without plan to be resumed as neurosurgery who evaluated also the patient together with the neurology felt that strokes are less likely embolic in nature  -Vasculitis work-up was negative  -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 for which outpatient follow-up with neurosurgery was suggested  -He continues on Lipitor 40 mg/day  -He has aphasia and memory loss with ambulatory dysfunction and reported hemiplegia on the nondominant side as effect of the stroke  -Loop recorder replaced in 2019 for previous stroke and reported as negative however, there is a plan to insert the new loop recorder for the patient in the near future with cardiology  -He has completed ARC rehabilitation and was discharged home in the first week of June  -Since being home patient has been unable to perform ADL and has experienced more difficulties with walking  -[address] admission, 7/1 he had a fall and was brought to the emergency department as per advice of visiting nurse who felt that the patient will require higher level of care that wife can no longer provide  -CT head in the emergency department negative for acute finding, evolving left parietal stroke  -He appears to be at baseline with some aphasia/dysarthria some memory impairment  -Wife reports that the patient is impulsive at home, not witnessed in the emergency department however  -Resume all chronic medications  -Strict fall precautions  -Strict aspiration precaution  -PT/OT and  evaluation possible placement at least short-term  -Pt family reported change in vision, more lethargy, issues with balance PTA. neuro was consulted and s/o MRI brain d/w neuro - no new stroke, felt stable. Fortunately he appears improved today per his report.  EEG reviewed with Dr. Rosalinda Palmer no epileptiform activity no objection to discharge.    -> awaiting insurance authorization for subsequent placement to rehab

## 2023-07-11 NOTE — PLAN OF CARE
Problem: INFECTION - ADULT  Goal: Absence or prevention of progression during hospitalization  Description: INTERVENTIONS:  - Assess and monitor for signs and symptoms of infection  - Monitor lab/diagnostic results  - Monitor all insertion sites, i.e. indwelling lines, tubes, and drains  - Administer medications as ordered  - Instruct and encourage patient and family to use good hand hygiene technique  - Identify and instruct in appropriate isolation precautions for identified infection/condition  Outcome: Progressing     Problem: SAFETY ADULT  Goal: Patient will remain free of falls  Description: INTERVENTIONS:  - Educate patient/family on patient safety including physical limitations  - Instruct patient to call for assistance with activity   - Consult OT/PT to assist with strengthening/mobility   - Keep Call bell within reach  - Keep bed low and locked with side rails adjusted as appropriate  - Keep care items and personal belongings within reach  - Initiate and maintain comfort rounds  - Make Fall Risk Sign visible to staff  - Offer Toileting every 2 Hours, in advance of need  - Initiate/Maintain bed alarm  - Obtain necessary fall risk management equipment: alarm, socks  - Apply yellow socks and bracelet for high fall risk patients  - Consider moving patient to room near nurses station  Outcome: Progressing     Problem: Knowledge Deficit  Goal: Patient/family/caregiver demonstrates understanding of disease process, treatment plan, medications, and discharge instructions  Description: Complete learning assessment and assess knowledge base.   Interventions:  - Provide teaching at level of understanding  - Provide teaching via preferred learning methods  Outcome: Progressing     Problem: Prexisting or High Potential for Compromised Skin Integrity  Goal: Skin integrity is maintained or improved  Description: INTERVENTIONS:  - Identify patients at risk for skin breakdown  - Assess and monitor skin integrity  - Assess and monitor nutrition and hydration status  - Monitor labs   - Assess for incontinence   - Turn and reposition patient  - Assist with mobility/ambulation  - Relieve pressure over bony prominences  - Avoid friction and shearing  - Provide appropriate hygiene as needed including keeping skin clean and dry  - Evaluate need for skin moisturizer/barrier cream  - Collaborate with interdisciplinary team   - Patient/family teaching  - Consider wound care consult   Outcome: Progressing     Problem: NEUROSENSORY - ADULT  Goal: Achieves stable or improved neurological status  Description: INTERVENTIONS  - Monitor and report changes in neurological status  - Monitor vital signs such as temperature, blood pressure, glucose, and any other labs ordered   - Initiate measures to prevent increased intracranial pressure  - Monitor for seizure activity and implement precautions if appropriate      Outcome: Progressing

## 2023-07-11 NOTE — OCCUPATIONAL THERAPY NOTE
Occupational Therapy Tx Note     Patient Name: Brice LEONG Date: 7/11/2023  Problem List  Principal Problem:    Stroke [facility])  Active Problems:    Fall    Type 2 diabetes mellitus ([address])    Memory deficits    Possible NPH (normal pressure hydrocephalus) ([address])          07/11/23 1508   OT Last Visit   OT Visit Date 07/11/23   Note Type   Note Type Treatment   Pain Assessment   Pain Assessment Tool 0-10   Pain Score No Pain   Restrictions/Precautions   Weight Bearing Precautions Per Order No   Other Precautions Cognitive; Chair Alarm; Fall Risk   ADL   LB Dressing Assistance 4  Minimal Assistance   LB Dressing Deficit Don/doff R sock; Don/doff L sock   LB Dressing Comments Pt able to doff BL socks with VCs & encouragement utilizing figure 4 position, required increased time. Pt required Min A to don BL socks for intitation of task by donning sock over toes, pt able to then carryout & terminate task. Bed Mobility   Additional Comments Received OOB to recliner   Transfers   Sit to Stand 5  Supervision   Additional items Armrests   Stand to Sit 5  Supervision   Additional items Armrests   Functional Mobility   Functional Mobility 5  Supervision   Additional Comments Greater than functional household distance through unit hallway   Additional items Rolling walker   Subjective   Subjective "Let's do it!"   Cognition   Overall Cognitive Status Impaired   Arousal/Participation Alert   Attention Within functional limits   Orientation Level Oriented to place;Oriented to person;Oriented to situation;Disoriented to time   Memory Decreased recall of recent events   Following Commands Follows one step commands with increased time or repetition   Activity Tolerance   Activity Tolerance Patient tolerated treatment well   Medical Staff Made Aware PT GARRETT Lamb   Assessment   Assessment Pt seen for OT tx session with focus on functional balance, functional mobility, ADL status, and transfer safety.  Patient agreeable to OT treatment session. Pt received seated OOB to Recliner. Pt performed all functional txfers with S. Pt completed functional mobility for greater than a household distance with S & RW. Pt able to doff BL socks with S & encouragement, required Min A to don BL socks. Pt has improved functionally since OT IE. Patient continues to be functioning below baseline level, occupational performance remains limited secondary to factors listed above, and pt at increased risk for falls and injury. The patient's raw score on the AM-PAC Daily Activity inpatient short form is 19, standardized score is 40.22, greater than 39.4. Patients at this level are likely to benefit from DC to home. Please refer to the recommendation of the Occupational Therapist for safe DC planning. From OT standpoint, recommendation at time of D/C would be DC with Level III resources. Patient to benefit from continued Occupational Therapy treatment while in the hospital to address deficits as defined above and maximize level of functional independence with ADLs and functional mobility. Pt left seated OOB to Recliner with call bell in reach, tray table in reach, needs met, chair alarm activated, RN informed. Plan   Treatment Interventions ADL retraining;Functional transfer training;Patient/family training; Compensatory technique education;Cognitive reorientation   Goal Expiration Date 07/15/23   OT Treatment Day 1   OT Frequency 2-3x/wk   Recommendation   UB Rehab Discharge Recommendation (PT/OT) Level 3  (With 24/7 S)   AM-PAC Daily Activity Inpatient   Lower Body Dressing 3   Bathing 3   Toileting 3   Upper Body Dressing 3   Grooming 3   Eating 4   Daily Activity Raw Score 19   Daily Activity Standardized Score (Calc for Raw Score >=11) 40.22   AM-PAC Applied Cognition Inpatient   Following a Speech/Presentation 2   Understanding Ordinary Conversation 3   Taking Medications 3   Remembering Where Things Are Placed or Put Away 2   Remembering List of 4-5 Errands 2   Taking Care of Complicated Tasks 2   Applied Cognition Raw Score 14   Applied Cognition Standardized Score 32.02   End of Consult   Education Provided Yes   Patient Position at End of Consult Bedside chair;Bed/Chair alarm activated; All needs within reach   Nurse Communication Nurse aware of consult       Niya Mcdaniels OTR/L

## 2023-07-11 NOTE — PLAN OF CARE
Problem: PAIN - ADULT  Goal: Verbalizes/displays adequate comfort level or baseline comfort level  Description: Interventions:  - Encourage patient to monitor pain and request assistance  - Assess pain using appropriate pain scale  - Administer analgesics based on type and severity of pain and evaluate response  - Implement non-pharmacological measures as appropriate and evaluate response  - Consider cultural and social influences on pain and pain management  - Notify physician/advanced practitioner if interventions unsuccessful or patient reports new pain  Outcome: Progressing     Problem: INFECTION - ADULT  Goal: Absence or prevention of progression during hospitalization  Description: INTERVENTIONS:  - Assess and monitor for signs and symptoms of infection  - Monitor lab/diagnostic results  - Monitor all insertion sites, i.e. indwelling lines, tubes, and drains  - Administer medications as ordered  - Instruct and encourage patient and family to use good hand hygiene technique  - Identify and instruct in appropriate isolation precautions for identified infection/condition  Outcome: Progressing     Problem: SAFETY ADULT  Goal: Patient will remain free of falls  Description: INTERVENTIONS:  - Educate patient/family on patient safety including physical limitations  - Instruct patient to call for assistance with activity   - Consult OT/PT to assist with strengthening/mobility   - Keep Call bell within reach  - Keep bed low and locked with side rails adjusted as appropriate  - Keep care items and personal belongings within reach  - Initiate and maintain comfort rounds  - Make Fall Risk Sign visible to staff  - Offer Toileting every 2 Hours, in advance of need  - Initiate/Maintain bed alarm  - Obtain necessary fall risk management equipment: alarm, socks  - Apply yellow socks and bracelet for high fall risk patients  - Consider moving patient to room near nurses station  Outcome: Progressing

## 2023-07-11 NOTE — PROGRESS NOTES
[facility]  Progress Note  Name: Erna Guillen  MRN: [id number]  Unit/Bed#: -01 I Date of Admission: 7/1/2023   Date of Service: 7/11/2023 I Hospital Day: 8    Assessment/Plan   * Stroke [facility])  Assessment & Plan  History of multiple stroke  Hospitalized May for multifocal ischemic infarcts from distributions with bilateral carotid stenosis  -Underwent left ICA PTA with stenting on May 4  -Originally it was on Eliquis and aspirin and after recent stroke he was transitioned to aspirin and Brilinta  -Also on Pletal for previous diagnosis of peripheral vascular disease  -During recent hospitalization Eliquis was discontinued without plan to be resumed as neurosurgery who evaluated also the patient together with the neurology felt that strokes are less likely embolic in nature  -Vasculitis work-up was negative  -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 for which outpatient follow-up with neurosurgery was suggested  -He continues on Lipitor 40 mg/day  -He has aphasia and memory loss with ambulatory dysfunction and reported hemiplegia on the nondominant side as effect of the stroke  -Loop recorder replaced in 2019 for previous stroke and reported as negative however, there is a plan to insert the new loop recorder for the patient in the near future with cardiology  -He has completed ARC rehabilitation and was discharged home in the first week of June  -Since being home patient has been unable to perform ADL and has experienced more difficulties with walking  -[address] admission, 7/1 he had a fall and was brought to the emergency department as per advice of visiting nurse who felt that the patient will require higher level of care that wife can no longer provide  -CT head in the emergency department negative for acute finding, evolving left parietal stroke  -He appears to be at baseline with some aphasia/dysarthria some memory impairment  -Wife reports that the patient is impulsive at home, not witnessed in the emergency department however  -Resume all chronic medications  -Strict fall precautions  -Strict aspiration precaution  -PT/OT and  evaluation possible placement at least short-term  -Pt family reported change in vision, more lethargy, issues with balance PTA. neuro was consulted and s/o MRI brain d/w neuro - no new stroke, felt stable. Fortunately he appears improved today per his report. EEG reviewed with Dr. Royal Pierre no epileptiform activity no objection to discharge.    -> awaiting insurance authorization for subsequent placement to rehab    Possible NPH (normal pressure hydrocephalus) ([address])  Assessment & Plan  He has established follow-up with neurosurgery, no need for further intervention while in the hospital  No Need for urgent lumbar puncture    Memory deficits  Assessment & Plan  Donepezil and Namenda, monitor for sundowning, strict fall precautions    Type 2 diabetes mellitus ([address])  Assessment & Plan  Lab Results   Component Value Date    HGBA1C 5.3 07/02/2023       No results for input(s): "POCGLU" in the last 72 hours. Blood Sugar Average: Last 72 hrs:       A1c 5.8  No concerns at this time  Not on any agent    Fall  Assessment & Plan  Please refer to principal           VTE Pharmacologic Prophylaxis:   Moderate Risk (Score 3-4) - Pharmacological DVT Prophylaxis Ordered: heparin. Patient Centered Rounds: I performed bedside rounds with nursing staff today. Discussions with Specialists or Other Care Team Provider: CM    Education and Discussions with Family / Patient: Updated  (wife) via phone.     Total Time Spent on Date of Encounter in care of patient: 35 minutes This time was spent on one or more of the following: performing physical exam; counseling and coordination of care; obtaining or reviewing history; documenting in the medical record; reviewing/ordering tests, medications or procedures; communicating with other healthcare professionals and discussing with patient's family/caregivers. Current Length of Stay: 8 day(s)  Current Patient Status: Inpatient   Certification Statement: The patient will continue to require additional inpatient hospital stay due to placement  Discharge Plan: Anticipate discharge tomorrow to rehab facility. Code Status: Level 1 - Full Code    Subjective:   Subha Adams was seen and examined at bedside. No acute events overnight. Discussed plan of care. All questions and concerns were answered and addressed. Has no acute complaints at this time. Objective:     Vitals:   Temp (24hrs), Av.9 °F (36.6 °C), Min:97.7 °F (36.5 °C), Max:98.1 °F (36.7 °C)    Temp:  [97.7 °F (36.5 °C)-98.1 °F (36.7 °C)] 97.7 °F (36.5 °C)  HR:  [58-93] 91  Resp:  [16-18] 17  BP: (101-131)/(53-88) 131/88  SpO2:  [96 %-97 %] 97 %  Body mass index is 30.27 kg/m². Input and Output Summary (last 24 hours): Intake/Output Summary (Last 24 hours) at 2023 1041  Last data filed at 2023 0847  Gross per 24 hour   Intake 692 ml   Output --   Net 692 ml       Physical Exam:   Physical Exam  Vitals and nursing note reviewed. Constitutional:       General: He is not in acute distress. Appearance: Normal appearance. He is obese. He is not ill-appearing. HENT:      Head: Normocephalic and atraumatic. Cardiovascular:      Rate and Rhythm: Normal rate and regular rhythm. Pulses: Normal pulses. Heart sounds: Normal heart sounds. Pulmonary:      Effort: Pulmonary effort is normal.      Breath sounds: Normal breath sounds. Abdominal:      General: Abdomen is flat. Bowel sounds are normal.      Palpations: Abdomen is soft. Musculoskeletal:      Right lower leg: No edema. Left lower leg: No edema. Skin:     General: Skin is warm. Neurological:      General: No focal deficit present. Mental Status: He is alert and oriented to person, place, and time.           Additional Data:     Labs:  Results from last 7 days   Lab Units 07/10/23  0644   WBC Thousand/uL 9.83   HEMOGLOBIN g/dL 12.3   HEMATOCRIT % 36.8   PLATELETS Thousands/uL 247   NEUTROS PCT % 51   LYMPHS PCT % 32   MONOS PCT % 10   EOS PCT % 6     Results from last 7 days   Lab Units 07/10/23  0644   SODIUM mmol/L 139   POTASSIUM mmol/L 3.5   CHLORIDE mmol/L 108   CO2 mmol/L 24   BUN mg/dL 18   CREATININE mg/dL 0.95   ANION GAP mmol/L 7   CALCIUM mg/dL 9.2   ALBUMIN g/dL 3.8   TOTAL BILIRUBIN mg/dL 0.46   ALK PHOS U/L 80   ALT U/L 15   AST U/L 12*   GLUCOSE RANDOM mg/dL 108                       Lines/Drains:  Invasive Devices     Peripheral Intravenous Line  Duration           Peripheral IV 07/09/23 Right Antecubital 2 days                      Imaging: No pertinent imaging reviewed.     Recent Cultures (last 7 days):         Last 24 Hours Medication List:   Current Facility-Administered Medications   Medication Dose Route Frequency Provider Last Rate   • acetaminophen  975 mg Oral TID PRN Liane Hobbs MD     • albuterol  1 puff Inhalation Q4H PRN Liane Hobbs MD     • aspirin  81 mg Oral Daily Liane Hobbs MD     • atorvastatin  40 mg Oral Daily With Breakfast Liane Hobbs MD     • baclofen  5 mg Oral Q12H Arely Justin PA-C     • cilostazol  100 mg Oral BID AC Liane Hobbs MD     • donepezil  10 mg Oral HS Arely Justin PA-C     • heparin (porcine)  5,000 Units Subcutaneous Q12H [address] Jesús Ross MD     • hydrALAZINE  5 mg Intravenous Q6H PRN Jesús Ross MD     • lidocaine  1 patch Topical Daily Arely Justin PA-C     • metoprolol succinate  100 mg Oral Daily Liane Hobbs MD     • pantoprazole  40 mg Oral Daily Liane Hobbs MD     • polyethylene glycol  17 g Oral QAM Liane Hobbs MD     • potassium chloride  40 mEq Oral Daily Jesús Ross MD     • senna-docusate sodium  2 tablet Oral BID Liane Hobbs MD     • tamsulosin  0.4 mg Oral Daily With Alf Fitzpatrick MD • ticagrelor  90 mg Oral Q12H [address] Karen Rivera MD     • traZODone  50 mg Oral HS Karen Rivera MD          Today, Patient Was Seen By: Pedro Vickers MD    **Please Note: This note may have been constructed using a voice recognition system. **

## 2023-07-12 VITALS
HEIGHT: 68 IN | BODY MASS INDEX: 30.07 KG/M2 | OXYGEN SATURATION: 97 % | WEIGHT: 198.41 LBS | HEART RATE: 68 BPM | RESPIRATION RATE: 14 BRPM | DIASTOLIC BLOOD PRESSURE: 85 MMHG | SYSTOLIC BLOOD PRESSURE: 146 MMHG | TEMPERATURE: 97.4 F

## 2023-07-12 LAB
ANION GAP SERPL CALCULATED.3IONS-SCNC: 7 MMOL/L
BUN SERPL-MCNC: 15 MG/DL (ref 5–25)
CALCIUM SERPL-MCNC: 9 MG/DL (ref 8.4–10.2)
CHLORIDE SERPL-SCNC: 109 MMOL/L (ref 96–108)
CO2 SERPL-SCNC: 24 MMOL/L (ref 21–32)
CREAT SERPL-MCNC: 0.92 MG/DL (ref 0.6–1.3)
ERYTHROCYTE [DISTWIDTH] IN BLOOD BY AUTOMATED COUNT: 13.6 % (ref 11.6–15.1)
GFR SERPL CREATININE-BSD FRML MDRD: 87 ML/MIN/1.73SQ M
GLUCOSE SERPL-MCNC: 101 MG/DL (ref 65–140)
HCT VFR BLD AUTO: 37.2 % (ref 36.5–49.3)
HGB BLD-MCNC: 12.5 G/DL (ref 12–17)
MCH RBC QN AUTO: 32.3 PG (ref 26.8–34.3)
MCHC RBC AUTO-ENTMCNC: 33.6 G/DL (ref 31.4–37.4)
MCV RBC AUTO: 96 FL (ref 82–98)
PLATELET # BLD AUTO: 257 THOUSANDS/UL (ref 149–390)
PMV BLD AUTO: 9.9 FL (ref 8.9–12.7)
POTASSIUM SERPL-SCNC: 3.5 MMOL/L (ref 3.5–5.3)
RBC # BLD AUTO: 3.87 MILLION/UL (ref 3.88–5.62)
SODIUM SERPL-SCNC: 140 MMOL/L (ref 135–147)
WBC # BLD AUTO: 11.09 THOUSAND/UL (ref 4.31–10.16)

## 2023-07-12 PROCEDURE — 99239 HOSP IP/OBS DSCHRG MGMT >30: CPT | Performed by: STUDENT IN AN ORGANIZED HEALTH CARE EDUCATION/TRAINING PROGRAM

## 2023-07-12 PROCEDURE — 80048 BASIC METABOLIC PNL TOTAL CA: CPT | Performed by: STUDENT IN AN ORGANIZED HEALTH CARE EDUCATION/TRAINING PROGRAM

## 2023-07-12 PROCEDURE — 85027 COMPLETE CBC AUTOMATED: CPT | Performed by: STUDENT IN AN ORGANIZED HEALTH CARE EDUCATION/TRAINING PROGRAM

## 2023-07-12 PROCEDURE — NC001 PR NO CHARGE: Performed by: STUDENT IN AN ORGANIZED HEALTH CARE EDUCATION/TRAINING PROGRAM

## 2023-07-12 RX ORDER — POTASSIUM CHLORIDE 20 MEQ/1
40 TABLET, EXTENDED RELEASE ORAL DAILY
Qty: 30 TABLET | Refills: 0
Start: 2023-07-13 | End: 2023-07-28

## 2023-07-12 RX ADMIN — TICAGRELOR 90 MG: 90 TABLET ORAL at 08:27

## 2023-07-12 RX ADMIN — ASPIRIN 81 MG: 81 TABLET, COATED ORAL at 08:08

## 2023-07-12 RX ADMIN — POTASSIUM CHLORIDE 40 MEQ: 1500 TABLET, EXTENDED RELEASE ORAL at 08:08

## 2023-07-12 RX ADMIN — LIDOCAINE 5% 1 PATCH: 700 PATCH TOPICAL at 08:10

## 2023-07-12 RX ADMIN — HEPARIN SODIUM 5000 UNITS: 5000 INJECTION INTRAVENOUS; SUBCUTANEOUS at 08:09

## 2023-07-12 RX ADMIN — ATORVASTATIN CALCIUM 40 MG: 40 TABLET, FILM COATED ORAL at 08:08

## 2023-07-12 RX ADMIN — METOPROLOL SUCCINATE 100 MG: 50 TABLET, EXTENDED RELEASE ORAL at 08:08

## 2023-07-12 RX ADMIN — CILOSTAZOL 100 MG: 50 TABLET ORAL at 06:28

## 2023-07-12 RX ADMIN — BACLOFEN 5 MG: 10 TABLET ORAL at 08:08

## 2023-07-12 RX ADMIN — PANTOPRAZOLE SODIUM 40 MG: 40 TABLET, DELAYED RELEASE ORAL at 08:08

## 2023-07-12 RX ADMIN — SENNOSIDES AND DOCUSATE SODIUM 2 TABLET: 8.6; 5 TABLET ORAL at 08:08

## 2023-07-12 NOTE — CASE MANAGEMENT
[facility] has received approved authorization from insurance:   [facility] of [employer].   Called in by [employer] [place] P#  [phone]  Authorization received for: SNF  Facility: [facility]  Authorization #: [id number]  Start of Care: 7/12  Next Review Date: 7/19  Continued Stay Care Coordinator: [phone]  Submit next review to: [address] notified: Jennifer Alexander

## 2023-07-12 NOTE — PROGRESS NOTES
[facility]  Progress Note  Name: Marcus Boston  MRN: [id number]  Unit/Bed#: -01 I Date of Admission: 7/1/2023   Date of Service: 7/12/2023 I Hospital Day: 9    Assessment/Plan   * Stroke [facility])  Assessment & Plan  History of multiple stroke  Hospitalized May for multifocal ischemic infarcts from distributions with bilateral carotid stenosis  -Underwent left ICA PTA with stenting on May 4  -Originally it was on Eliquis and aspirin and after recent stroke he was transitioned to aspirin and Brilinta  -Also on Pletal for previous diagnosis of peripheral vascular disease  -During recent hospitalization Eliquis was discontinued without plan to be resumed as neurosurgery who evaluated also the patient together with the neurology felt that strokes are less likely embolic in nature  -Vasculitis work-up was negative  -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 for which outpatient follow-up with neurosurgery was suggested  -He continues on Lipitor 40 mg/day  -He has aphasia and memory loss with ambulatory dysfunction and reported hemiplegia on the nondominant side as effect of the stroke  -Loop recorder replaced in 2019 for previous stroke and reported as negative however, there is a plan to insert the new loop recorder for the patient in the near future with cardiology  -He has completed [facility]C rehabilitation and was discharged home in the first week of June  -Since being home patient has been unable to perform ADL and has experienced more difficulties with walking  -[address] admission, 7/1 he had a fall and was brought to the emergency department as per advice of visiting nurse who felt that the patient will require higher level of care that wife can no longer provide  -CT head in the emergency department negative for acute finding, evolving left parietal stroke  -He appears to be at baseline with some aphasia/dysarthria some memory impairment  -Wife reports that the patient is impulsive at home, not witnessed in the emergency department however  -Resume all chronic medications  -Strict fall precautions  -Strict aspiration precaution  -PT/OT and  evaluation possible placement at least short-term  -Pt family reported change in vision, more lethargy, issues with balance PTA. neuro was consulted and s/o MRI brain d/w neuro - no new stroke, felt stable. Fortunately he appears improved today per his report. EEG reviewed with Dr. Jaydon Jarquin no epileptiform activity no objection to discharge.    -> awaiting insurance authorization for subsequent placement to rehab    Possible NPH (normal pressure hydrocephalus) ([address])  Assessment & Plan  He has established follow-up with neurosurgery, no need for further intervention while in the hospital  No Need for urgent lumbar puncture    Memory deficits  Assessment & Plan  Donepezil and Namenda, monitor for sundowning, strict fall precautions    Type 2 diabetes mellitus ([address])  Assessment & Plan  Lab Results   Component Value Date    HGBA1C 5.3 07/02/2023       No results for input(s): "POCGLU" in the last 72 hours. Blood Sugar Average: Last 72 hrs:       A1c 5.8  No concerns at this time  Not on any agent    Fall  Assessment & Plan  Please refer to principal             VTE Pharmacologic Prophylaxis:   Moderate Risk (Score 3-4) - Pharmacological DVT Prophylaxis Ordered: heparin. Patient Centered Rounds: I performed bedside rounds with nursing staff today. Discussions with Specialists or Other Care Team Provider: CM, Pt, Ot    Education and Discussions with Family / Patient: Updated  (wife) via phone.     Total Time Spent on Date of Encounter in care of patient: 35 minutes This time was spent on one or more of the following: performing physical exam; counseling and coordination of care; obtaining or reviewing history; documenting in the medical record; reviewing/ordering tests, medications or procedures; communicating with other healthcare professionals and discussing with patient's family/caregivers. Current Length of Stay: 9 day(s)  Current Patient Status: Inpatient   Certification Statement: The patient will continue to require additional inpatient hospital stay due to placement  Discharge Plan: Anticipate discharge tomorrow to rehab facility. Code Status: Level 1 - Full Code    Subjective:   Aiyana Mcdowell was seen and examined at bedside. No acute events overnight. Discussed plan of care. All questions and concerns were answered and addressed. Has no acute complaints at this time. Objective:     Vitals:   Temp (24hrs), Av.7 °F (36.5 °C), Min:97.4 °F (36.3 °C), Max:98 °F (36.7 °C)    Temp:  [97.4 °F (36.3 °C)-98 °F (36.7 °C)] 97.4 °F (36.3 °C)  HR:  [68-95] 68  Resp:  [14-17] 14  BP: (111-146)/(68-85) 146/85  SpO2:  [97 %-98 %] 97 %  Body mass index is 30.17 kg/m². Input and Output Summary (last 24 hours): Intake/Output Summary (Last 24 hours) at 2023 0754  Last data filed at 2023 1727  Gross per 24 hour   Intake 780 ml   Output --   Net 780 ml       Physical Exam:   Physical Exam   Vitals and nursing note reviewed. Constitutional:       General: He is not in acute distress. Appearance: Normal appearance. He is obese. He is not ill-appearing. HENT:      Head: Normocephalic and atraumatic. Cardiovascular:      Rate and Rhythm: Normal rate and regular rhythm. Pulses: Normal pulses. Heart sounds: Normal heart sounds. Pulmonary:      Effort: Pulmonary effort is normal.      Breath sounds: Normal breath sounds. Abdominal:      General: Abdomen is flat. Bowel sounds are normal.      Palpations: Abdomen is soft. Musculoskeletal:      Right lower leg: No edema. Left lower leg: No edema. Skin:     General: Skin is warm. Neurological:      General: No focal deficit present. Mental Status: He is alert and oriented to person, place, and time.      Additional Data:     Labs:  Results from last 7 days   Lab Units 07/12/23  0508 07/10/23  0644   WBC Thousand/uL 11.09* 9.83   HEMOGLOBIN g/dL 12.5 12.3   HEMATOCRIT % 37.2 36.8   PLATELETS Thousands/uL 257 247   NEUTROS PCT %  --  51   LYMPHS PCT %  --  32   MONOS PCT %  --  10   EOS PCT %  --  6     Results from last 7 days   Lab Units 07/12/23  0508 07/10/23  0644   SODIUM mmol/L 140 139   POTASSIUM mmol/L 3.5 3.5   CHLORIDE mmol/L 109* 108   CO2 mmol/L 24 24   BUN mg/dL 15 18   CREATININE mg/dL 0.92 0.95   ANION GAP mmol/L 7 7   CALCIUM mg/dL 9.0 9.2   ALBUMIN g/dL  --  3.8   TOTAL BILIRUBIN mg/dL  --  0.46   ALK PHOS U/L  --  80   ALT U/L  --  15   AST U/L  --  12*   GLUCOSE RANDOM mg/dL 101 108                       Lines/Drains:  Invasive Devices     Peripheral Intravenous Line  Duration           Peripheral IV 07/09/23 Right Antecubital 3 days                      Imaging: No pertinent imaging reviewed.     Recent Cultures (last 7 days):         Last 24 Hours Medication List:   Current Facility-Administered Medications   Medication Dose Route Frequency Provider Last Rate   • acetaminophen  975 mg Oral TID PRN Beatris Barrera MD     • albuterol  1 puff Inhalation Q4H PRN Beatris Barrera MD     • aspirin  81 mg Oral Daily Beatris Barrera MD     • atorvastatin  40 mg Oral Daily With Breakfast Beatris Barrera MD     • baclofen  5 mg Oral Q12H Elis Lizarraga PA-C     • cilostazol  100 mg Oral BID AC Beatris Barrera MD     • donepezil  10 mg Oral HS Elis Lizarraga PA-C     • heparin (porcine)  5,000 Units Subcutaneous Q12H [address] Yvrose Petersen MD     • hydrALAZINE  5 mg Intravenous Q6H PRN Yvrose Petersen MD     • lidocaine  1 patch Topical Daily Elis Lizarraga PA-C     • metoprolol succinate  100 mg Oral Daily Beatris Barrera MD     • pantoprazole  40 mg Oral Daily Beatris Barrera MD     • polyethylene glycol  17 g Oral QAM Beatris Barrera MD     • potassium chloride  40 mEq Oral Daily Yvrose Petersen MD • senna-docusate sodium  2 tablet Oral BID Melody Acevedo MD     • tamsulosin  0.4 mg Oral Daily With Raymond Boles MD     • ticagrelor  90 mg Oral Q12H [facility] Melody Acevedo MD     • traZODone  50 mg Oral HS Melody Acevedo MD          Today, Patient Was Seen By: Gama Apodaca MD    **Please Note: This note may have been constructed using a voice recognition system. **

## 2023-07-12 NOTE — CASE MANAGEMENT
Case Management Discharge Planning Note    Patient name Alta Aviles  Location /-52 MRN [id number]  : 1960 Date 2023       Current Admission Date: 2023  Current Admission Diagnosis:Stroke [facility])   Patient Active Problem List    Diagnosis Date Noted   • Aphasia 2023   • Atherosclerosis of native arteries of extremities with intermittent claudication, bilateral legs ([address]) 2023   • Benign prostatic hyperplasia with lower urinary tract symptoms 2023   • Possible NPH (normal pressure hydrocephalus) ([address]) 2023   • Muscle spasms of both lower extremities 2023   • Multiple thyroid nodules 2023   • Abnormal laboratory test 05/15/2023   • History of tobacco use 2023   • Chronic ischemic left MCA stroke 2023   • Hypokalemia 2023   • Infrarenal abdominal aortic aneurysm (AAA) without rupture ([address]) 2023   • Tachycardia 2023   • Prediabetes 2023   • SIRS (systemic inflammatory response syndrome) ([address]) 2023   • Chronic anticoagulation 2023   • Stroke ([address]) 2023   • Hyponatremia 2023   • Middle cerebral artery stenosis, right 2023   • Left posterior MCA stroke - etiology unclear at this time 2023   • Chronic low back pain 2023   • Hypertensive encephalopathy, transient 2023   • Snoring 08/10/2020   • Memory deficits 08/10/2020   • Chronic ischemic right MCA stroke 2019   • Status post placement of implantable loop recorder 2019   • Type 2 diabetes mellitus ([address]) 2019   • Anxiety and depression 2019   • Insomnia 2019   • Fall 2019   • 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 ([address]) 2019   • Urinary retention 2019   • At risk for venous thromboembolism (VTE) 2019   • Hypertriglyceridemia 2019   • Nicotine dependence 2019   • Bilateral carotid artery stenosis 2019   • Presbyopia 2019   • History of stroke 03/19/2019   • Headache 03/19/2019   • Primary hypertension 03/19/2019   • GERD (gastroesophageal reflux disease) 03/19/2019      LOS (days): 9  Geometric Mean LOS (GMLOS) (days):   Days to GMLOS:     OBJECTIVE:  Risk of Unplanned Readmission Score: 23.25         Current admission status: Inpatient   Preferred Pharmacy:   [facility] [address] VALDEZ AYALA [address] [place] [address]  [facility]  Phone: [phone] Fax: 500 [facility], [address]  [address] [place] [address]  Phone: [phone] Fax: [phone]    Primary Care Provider: ERIC Perez    Primary Insurance: BLUE CROSS  Secondary Insurance: [facility] [employer] REP    DISCHARGE DETAILS:    IMM Given (Date):: 07/12/23  IMM Given to[de-identified] Patient  IMM reviewed with patient, patient agrees with discharge determination.

## 2023-07-12 NOTE — CASE MANAGEMENT
Case Management Discharge Planning Note    Patient name Jodi Chairez  Location /-52 MRN [id number]  : 1960 Date 2023       Current Admission Date: 2023  Current Admission Diagnosis:Stroke [facility])   Patient Active Problem List    Diagnosis Date Noted   • Aphasia 2023   • Atherosclerosis of native arteries of extremities with intermittent claudication, bilateral legs ([address]) 2023   • Benign prostatic hyperplasia with lower urinary tract symptoms 2023   • Possible NPH (normal pressure hydrocephalus) ([address]) 2023   • Muscle spasms of both lower extremities 2023   • Multiple thyroid nodules 2023   • Abnormal laboratory test 05/15/2023   • History of tobacco use 2023   • Chronic ischemic left MCA stroke 2023   • Hypokalemia 2023   • Infrarenal abdominal aortic aneurysm (AAA) without rupture ([address]) 2023   • Tachycardia 2023   • Prediabetes 2023   • SIRS (systemic inflammatory response syndrome) ([address]) 2023   • Chronic anticoagulation 2023   • Stroke ([address]) 2023   • Hyponatremia 2023   • Middle cerebral artery stenosis, right 2023   • Left posterior MCA stroke - etiology unclear at this time 2023   • Chronic low back pain 2023   • Hypertensive encephalopathy, transient 2023   • Snoring 08/10/2020   • Memory deficits 08/10/2020   • Chronic ischemic right MCA stroke 2019   • Status post placement of implantable loop recorder 2019   • Type 2 diabetes mellitus ([address]) 2019   • Anxiety and depression 2019   • Insomnia 2019   • Fall 2019   • 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 ([address]) 2019   • Urinary retention 2019   • At risk for venous thromboembolism (VTE) 2019   • Hypertriglyceridemia 2019   • Nicotine dependence 2019   • Bilateral carotid artery stenosis 2019   • Presbyopia 2019   • History of stroke 03/19/2019   • Headache 03/19/2019   • Primary hypertension 03/19/2019   • GERD (gastroesophageal reflux disease) 03/19/2019      LOS (days): 9  Geometric Mean LOS (GMLOS) (days):   Days to GMLOS:     OBJECTIVE:  Risk of Unplanned Readmission Score: 23.3         Current admission status: Inpatient   Preferred Pharmacy:   [address] [place] [address] Road  [address]  Phone: [phone] Fax: [phone] [address], 210 [facility] [address] [place] [address] [address] [address]  Phone: [phone] Fax: [phone]    Primary Care Provider: ERIC Can    Primary Insurance: BLUE CROSS  Secondary Insurance: [facility] [employer] REP    DISCHARGE DETAILS:    Additional Comments: Insurance auth obtained from [facility] discharge support to go to [facility] today. Wheelchair van transport arranged with [employer]. Tita Borja is 3:30pm. Aruna Lowe at [facility], [address] and Pt's wife aware of discharge and discharge time. Pt in agreement for wheelchair [facility]ure[facility] Cabot transport. Pt's nurse(Jesse) informed of transport time. Notification made to OP CM Handoff: [facility] OP CM regarding discharge planning and disposition.

## 2023-07-12 NOTE — ASSESSMENT & PLAN NOTE
History of multiple stroke  Hospitalized May for multifocal ischemic infarcts from distributions with bilateral carotid stenosis  -Underwent left ICA PTA with stenting on May 4  -Originally it was on Eliquis and aspirin and after recent stroke he was transitioned to aspirin and Brilinta  -Also on Pletal for previous diagnosis of peripheral vascular disease  -During recent hospitalization Eliquis was discontinued without plan to be resumed as neurosurgery who evaluated also the patient together with the neurology felt that strokes are less likely embolic in nature  -Vasculitis work-up was negative  -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 for which outpatient follow-up with neurosurgery was suggested  -He continues on Lipitor 40 mg/day  -He has aphasia and memory loss with ambulatory dysfunction and reported hemiplegia on the nondominant side as effect of the stroke  -Loop recorder replaced in 2019 for previous stroke and reported as negative however, there is a plan to insert the new loop recorder for the patient in the near future with cardiology  -He has completed [facility]C rehabilitation and was discharged home in the first week of June  -Since being home patient has been unable to perform ADL and has experienced more difficulties with walking  -[address] admission, 7/1 he had a fall and was brought to the emergency department as per advice of visiting nurse who felt that the patient will require higher level of care that wife can no longer provide  -CT head in the emergency department negative for acute finding, evolving left parietal stroke  -He appears to be at baseline with some aphasia/dysarthria some memory impairment  -Wife reports that the patient is impulsive at home, not witnessed in the emergency department however  -Resume all chronic medications  -Strict fall precautions  -Strict aspiration precaution  -PT/OT and  evaluation possible placement at least short-term  -Pt family reported change in vision, more lethargy, issues with balance PTA. neuro was consulted and s/o MRI brain d/w neuro - no new stroke, felt stable. Fortunately he appears improved today per his report. EEG reviewed with Dr. Bansal Sera no epileptiform activity no objection to discharge.     To go to rehab at [facility]

## 2023-07-12 NOTE — CASE MANAGEMENT
Case Management Discharge Planning Note    Patient name Sharon Blake  Location /-86 MRN [id number]  : 1960 Date 2023       Current Admission Date: 2023  Current Admission Diagnosis:Stroke [facility])   Patient Active Problem List    Diagnosis Date Noted   • Aphasia 2023   • Atherosclerosis of native arteries of extremities with intermittent claudication, bilateral legs ([address]) 2023   • Benign prostatic hyperplasia with lower urinary tract symptoms 2023   • Possible NPH (normal pressure hydrocephalus) ([address]) 2023   • Muscle spasms of both lower extremities 2023   • Multiple thyroid nodules 2023   • Abnormal laboratory test 05/15/2023   • History of tobacco use 2023   • Chronic ischemic left MCA stroke 2023   • Hypokalemia 2023   • Infrarenal abdominal aortic aneurysm (AAA) without rupture ([address]) 2023   • Tachycardia 2023   • Prediabetes 2023   • SIRS (systemic inflammatory response syndrome) ([address]) 2023   • Chronic anticoagulation 2023   • Stroke ([address]) 2023   • Hyponatremia 2023   • Middle cerebral artery stenosis, right 2023   • Left posterior MCA stroke - etiology unclear at this time 2023   • Chronic low back pain 2023   • Hypertensive encephalopathy, transient 2023   • Snoring 08/10/2020   • Memory deficits 08/10/2020   • Chronic ischemic right MCA stroke 2019   • Status post placement of implantable loop recorder 2019   • Type 2 diabetes mellitus ([address]) 2019   • Anxiety and depression 2019   • Insomnia 2019   • Fall 2019   • 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 ([address]) 2019   • Urinary retention 2019   • At risk for venous thromboembolism (VTE) 2019   • Hypertriglyceridemia 2019   • Nicotine dependence 2019   • Bilateral carotid artery stenosis 2019   • Presbyopia 2019   • History of stroke 03/19/2019   • Headache 03/19/2019   • Primary hypertension 03/19/2019   • GERD (gastroesophageal reflux disease) 03/19/2019      LOS (days): 9  Geometric Mean LOS (GMLOS) (days):   Days to GMLOS:     OBJECTIVE:  Risk of Unplanned Readmission Score: 23.25         Current admission status: Inpatient   Preferred Pharmacy:   [facility] [address] VALDEZ AYALA [address] [place] [address]  [address] [facility] 110 [facility]  Phone: [phone] Fax: 734 [facility], [address]  [place] [address]  Phone: [phone] Fax: [phone]    Primary Care Provider: Yves Santamaria    Primary Insurance: BLUE CROSS  Secondary Insurance: Selena Skaggs Number: [id number]

## 2023-07-12 NOTE — UTILIZATION REVIEW
Continued Stay Review    Date:  7/12/23                          Current Patient Class: inpatient  Current Level of Care: med surg     HPI:63 y.o. male initially admitted on 7/1/23 to observation and converted to inpatient on 7/2/23 due to Possible NPH/Stroke/Memory deficits/Type 2 DM. History of multiple strokes, left ICA PTA with stent May 4, 2023. Completed rehab at [facility] and discharged first week of June. No need for LP for NPH, OP follow up with neurosurgery. Represented due to  Fall, unable to perform ADL, increased difficulty with walking. PT/OT recommend short term placement. Insurance Yaphie is pending    Assessment/Plan:   7/12/23:  No acute events. Ready for rehab. Has decreased strength, impaired mobility. On exam obese. Decreased cognition. Hemiplegia on nondominant side from stroke. Some aphasia, dysarthria   Wbc 11.09   To continue PT/OT. Continue lipitor    Vital Signs:   07/12/23 07:08:56 97.4 °F (36.3 °C) Abnormal  68 14 146/85 105 97 % -- --   07/11/23 20:10:50 98 °F (36.7 °C) 89 14 116/68 84 97 % None (Room air) Lying   07/11/23 14:14:11 97.8 °F (36.6 °C) 95 17 111/68 82 98 % None (Room air) Sitting   07/11/23 07:23:30 97.7 °F (36.5 °C) 91 17 131/88 102 97 % None (Room air)      Pertinent Labs/Diagnostic Results:   XR shoulder 2+ vw left   Final Result by Rafia Ventura MD (07/05 1132)      No acute osseous abnormality. Mild multifocal productive changes. Left rotator cuff tendinopathy. Workstation performed: [id number]         XR shoulder 2+ vw right   Final Result by Rafia Ventura MD (07/05 1132)      No acute osseous abnormality. Mild multifocal productive changes. Left rotator cuff tendinopathy. Workstation performed: [id number]         MRI brain wo contrast   Final Result by HAL Fitch MD (07/02 1316)      Redemonstration of evolving left parietal/occipital infarct with residual but improving DWI signal that is now mostly normalized on ADC.    No acute infarct or hemorrhage. Stable ventricular prominence, correlate for NPH. Workstation performed: [id number]         CT head wo contrast   Final Result by Marcella Oswald MD (07/01 1548)      Evidence of maturing left occipital parietal infarct. There is increased encephalomalacia. No new hemorrhage. Ventriculomegaly is disproportionate and could represent normal pressure hydrocephalus.                   Workstation performed: [id number]           Results from last 7 days   Lab Units 07/12/23  0508 07/10/23  0644 07/09/23  0446 07/08/23  0347 07/06/23  0457   WBC Thousand/uL 11.09* 9.83 9.91 9.80 10.63*   HEMOGLOBIN g/dL 12.5 12.3 13.3 12.7 12.4   HEMATOCRIT % 37.2 36.8 39.2 37.1 37.5   PLATELETS Thousands/uL 257 247 272 246 249   NEUTROS ABS Thousands/µL  --  4.93 5.54 5.03 5.86     Results from last 7 days   Lab Units 07/12/23  0508 07/10/23  0644 07/09/23  0446 07/08/23  0347 07/06/23  0457   SODIUM mmol/L 140 139 141 140 140   POTASSIUM mmol/L 3.5 3.5 3.3* 3.1* 3.4*   CHLORIDE mmol/L 109* 108 107 109* 109*   CO2 mmol/L 24 24 26 24 23   ANION GAP mmol/L 7 7 8 7 8   BUN mg/dL 15 18 20 19 24   CREATININE mg/dL 0.92 0.95 0.98 0.86 0.91   EGFR ml/min/1.73sq m 87 84 81 91 88   CALCIUM mg/dL 9.0 9.2 9.5 9.3 9.1     Results from last 7 days   Lab Units 07/10/23  0644 07/09/23 0446 07/08/23 0347 07/06/23 0457   AST U/L 12* 13 14 13   ALT U/L 15 16 14 12   ALK PHOS U/L 80 85 75 74   TOTAL PROTEIN g/dL 6.1* 6.6 6.2* 6.1*   ALBUMIN g/dL 3.8 4.0 3.7 3.7   TOTAL BILIRUBIN mg/dL 0.46 0.72 0.60 0.51     Results from last 7 days   Lab Units 07/12/23  0508 07/10/23  0644 07/09/23  0446 07/08/23  0347 07/06/23  0457   GLUCOSE RANDOM mg/dL 101 108 103 95 94       Medications:   Scheduled Medications:  aspirin, 81 mg, Oral, Daily  atorvastatin, 40 mg, Oral, Daily With Breakfast  baclofen, 5 mg, Oral, Q12H  cilostazol, 100 mg, Oral, BID AC  donepezil, 10 mg, Oral, HS  heparin (porcine), 5,000 Units, Subcutaneous, Q12H [facility]  lidocaine, 1 patch, Topical, Daily  metoprolol succinate, 100 mg, Oral, Daily  pantoprazole, 40 mg, Oral, Daily  polyethylene glycol, 17 g, Oral, QAM  potassium chloride, 40 mEq, Oral, Daily  senna-docusate sodium, 2 tablet, Oral, BID  tamsulosin, 0.4 mg, Oral, Daily With Dinner  ticagrelor, 90 mg, Oral, Q12H JOSE  traZODone, 50 mg, Oral, HS      Continuous IV Infusions:     PRN Meds:  acetaminophen, 975 mg, Oral, TID PRN  albuterol, 1 puff, Inhalation, Q4H PRN  hydrALAZINE, 5 mg, Intravenous, Q6H PRN        Discharge Plan: to be determined. Network Utilization Review Department  ATTENTION: Please call with any questions or concerns to [phone] and carefully listen to the prompts so that you are directed to the right person. All voicemails are confidential.  Becky Cardoso all requests for admission clinical reviews, approved or denied determinations and any other requests to dedicated fax number below belonging to the campus where the patient is receiving treatment.  List of dedicated fax numbers for the Facilities:  [place] DENIALS (Administrative/Medical Necessity) [phone]   [address] (Maternity/NICU/Pediatrics) [phone]   [address] [place] [address] Drive [phone]   [facility] 1000 [facility] [phone]   1500 [facility] 207 [facility] [id number] [place] [address] [phone]   [address] [id number] [place] [address] Nn [phone]

## 2023-07-12 NOTE — CASE MANAGEMENT
Case Management Progress Note    Patient name Gabriela Keyes  Location [place] [facility]/-59 MRN [id number]  : 1960 Date 2023       LOS (days): 9  Geometric Mean LOS (GMLOS) (days):   Days to [facility]:        OBJECTIVE:        Current admission status: Inpatient  Preferred Pharmacy:   [facility] DR VALDEZ AYALA [address] [place] [address]  122 [address] [facility] [address]  Phone: [phone] Fax: [phone] [address], 210 [facility] [address]  [address] [place] [address]  Phone: [phone] Fax: [phone]    Primary Care Provider: ERIC Villegas    Primary Insurance: BLUE CROSS  Secondary Insurance: [facility] REP    PROGRESS NOTE:  Call received from Shelton Moser at [facility], skilled auth request will have to be reviewed with Medical Director. Await determination.

## 2023-07-12 NOTE — CASE MANAGEMENT
Called [facility]. ([phone]) to check status of authorization. Spoke to Silvestre who stated no information is on file as of now. Silvestre stated fax number was [phone]. Notified [place] that faxes were sent to that number multiple times and failed, Silvestre requested to try again and fax information to that number. Gilberto Endow information to [phone] & [phone] via [employer].  notified.

## 2023-07-12 NOTE — NURSING NOTE
Patient discharged to [facility] in stable condition, via wheelchair [place]. Report called to receiving facility.

## 2023-07-13 NOTE — UTILIZATION REVIEW
NOTIFICATION OF ADMISSION DISCHARGE   This is a Notification of Discharge from [facility]. Please be advised that this patient has been discharge from our facility. Below you will find the admission and discharge date and time including the patient’s disposition. UTILIZATION REVIEW CONTACT:  Traci Light  Utilization   Network Utilization Review Department  Phone: [phone] carefully listen to the prompts. All voicemails are confidential.  Email: [email or web address]     ADMISSION INFORMATION  PRESENTATION DATE: 7/1/2023  1:16 PM  OBERVATION ADMISSION DATE:   INPATIENT ADMISSION DATE: 7/3/23  1:51 PM   DISCHARGE DATE: 7/12/2023  4:23 PM   DISPOSITION:Non [facility] SNF/TCU/SNU    IMPORTANT INFORMATION:  Send all requests for admission clinical reviews, approved or denied determinations and any other requests to dedicated fax number below belonging to the campus where the patient is receiving treatment.  List of dedicated fax numbers:  [place] DENIALS (Administrative/Medical Necessity) [phone] 2303 [facility] (Maternity/NICU/Pediatrics) [phone]   [facility] [phone]   [facility] [phone] 1636 [facility] [phone] 401 [facility] [phone]   [facility] [phone]   270 [facility] 608 [facility] [phone]   [address] [place] [address] [phone]   3445 [facility] [phone]   2726 [facility] 3000 [address] [facility] [phone]

## 2023-07-18 ENCOUNTER — TELEPHONE (OUTPATIENT)
Dept: NEUROLOGY | Facility: CLINIC | Age: 63
End: 2023-07-18

## 2023-07-18 NOTE — TELEPHONE ENCOUNTER
Hi Dr. Jennifer Davila,    Patient was prescribed to take ASA, pletal and brilinta when he left the hospital. But then the provider at [facility] rehab stopped the brilinta due to a contraindication with the other blood thinners, and started him on plavix in addition to the ASA and pletal. Currently he is taking ASA, pletal and plavix. Just confirming, we should stop the pletal?  I will cancel the appt with Arely Mata.      Thank you,  [place]

## 2023-07-18 NOTE — UTILIZATION REVIEW
Continued Stay Review    Date: 7/5/23                       Current Patient Class: Inpatient  Current Level of Care: Med Surg    HPI:63 y.o. male initially admitted as Observation on 7/1/23, converted to Inpatient on 7/3/23.     7/5 Potassium 3.4, repleted with 40 mEq PO. PE: Ment[place] baseline. Lungs CTAB. Per Case Management,  PT/OT recommendation for SNF. Patient in agreement with SNF, patient's wife in agreement for blanket SNF referrals within 10 miles. Message left for patient's wife to inform of accepting SNF facilities, await return call. Will need to obtain insurance auth prior to discharge.      Vital Signs:     Date and Time Temp Pulse SpO2 Resp BP   07/05/23 2144 97.6 °F (36.4 °C) 73 97 % -- 141/70   07/05/23 1405 97.9 °F (36.6 °C) 78 98 % 16 140/71   07/05/23 0952 -- -- -- -- --   07/05/23 0930 -- -- -- -- --   07/05/23 0805 -- -- -- -- --   07/05/23 0717 97.7 °F (36.5 °C) 62 97 % 15 146/69   07/04/23 2110 97.9 °F (36.6 °C) 73 100 % -- 148/72   07/04/23 2100 -- -- -- -- --   07/04/23 1359 97.6 °F (36.4 °C) 84 97 % 16 100/56   07/04/23 0920 -- -- -- -- --   07/04/23 0920 -- 88 91 % -- 142/68   07/04/23 0918 -- 88 -- -- 142/68   07/04/23 0718 97.7 °F (36.5 °C) 63 93 % 16 109/64       Date and Time Eye Opening Best Verbal Response Best Motor Response [place] Coma Scale Score   07/05/23 0805 4 5 6 15       Pertinent Labs/Diagnostic Results:      Latest Reference Range & Units 07/01/23 13:28 07/03/23 03:52 07/05/23 03:18   WBC 4.31 - 10.16 Thousand/uL 10.88 (H) 9.74 9.91   Red Blood Cell Count 3.88 - 5.62 Million/uL 3.85 (L) 3.58 (L) 3.84 (L)   Hemoglobin 12.0 - 17.0 g/dL 12.6 11.6 (L) 12.5   HCT 36.5 - 49.3 % 37.5 35.0 (L) 36.4 (L)   MCV 82 - 98 fL 97 98 95   MCH 26.8 - 34.3 pg 32.7 32.4 32.6   MCHC 31.4 - 37.4 g/dL 33.6 33.1 34.3   RDW 11.6 - 15.1 % 13.8 13.5 13.5   Platelet Count 271 - 390 Thousands/uL 272 246 258   MPV 8.9 - 12.7 fL 10.0 10.2 9.7   nRBC /100 WBCs 0 0 0   Neutrophils % 43 - 75 % 59 57 57 Immat GRANS % 0 - 2 % 1 0 0   Lymphocytes Relative 14 - 44 % 25 27 27   Monocytes Relative 4 - 12 % 9 9 11   Eosinophils 0 - 6 % 5 6 4   Basophils Relative 0 - 1 % 1 1 1   Immature Grans Absolute 0.00 - 0.20 Thousand/uL 0.07 0.03 0.02   Absolute Neutrophils 1.85 - 7.62 Thousands/µL 6.39 5.53 5.72   Lymphocytes Absolute 0.60 - 4.47 Thousands/µL 2.76 2.63 2.65   Absolute Monocytes 0.17 - 1.22 Thousand/µL 1.00 0.92 1.05   Absolute Eosinophils 0.00 - 0.61 Thousand/µL 0.58 0.55 0.38   Basophils Absolute 0.00 - 0.10 Thousands/µL 0.08 0.08 0.09   (H): Data is abnormally high  (L): Data is abnormally low       Latest Reference Range & Units 07/01/23 13:28 07/03/23 03:52 07/05/23 03:18   Sodium 135 - 147 mmol/L 142 140 139   Potassium 3.5 - 5.3 mmol/L 4.0 3.3 (L) 3.4 (L)   Chloride 96 - 108 mmol/L 108 109 (H) 109 (H)   CO2 21 - 32 mmol/L 27 26 24   Anion Gap mmol/L 7 5 6   BUN 5 - 25 mg/dL 16 18 25   Creatinine 0.60 - 1.30 mg/dL 1.17 0.98 1.02   Glucose, Random 65 - 140 mg/dL 95 89 127   GLUCOSE FASTING 65 - 99 mg/dL  89    Calcium 8.4 - 10.2 mg/dL 9.5 8.9 9.2   CORRECTED CALCIUM 8.3 - 10.1 mg/dL  9.4    AST 13 - 39 U/L  12 (L) 12 (L)   ALT 7 - 52 U/L  13 12   Alkaline Phosphatase 34 - 104 U/L  72 76   Total Protein 6.4 - 8.4 g/dL  5.6 (L) 6.0 (L)   Albumin 3.5 - 5.0 g/dL  3.4 (L) 3.7   TOTAL BILIRUBIN 0.20 - 1.00 mg/dL  0.57 0.56   eGFR ml/min/1.73sq m 65 81 77   (L): Data is abnormally low  (H): Data is abnormally high          Medications:   Scheduled Medications:    aspirin, 81 mg, Oral, Daily  atorvastatin, 40 mg, Oral, Daily With Breakfast  baclofen, 5 mg, Oral, Q12H  cilostazol, 100 mg, Oral, BID AC  donepezil, 10 mg, Oral, HS  heparin (porcine), 5,000 Units, Subcutaneous, Q8H JOSE  lidocaine, 1 patch, Topical, Daily  magnesium Oxide, 400 mg, Oral, BID  metoprolol succinate, 100 mg, Oral, Daily  pantoprazole, 40 mg, Oral, Daily  polyethylene glycol, 17 g, Oral, QAM  senna-docusate sodium, 2 tablet, Oral, BID  tamsulosin, 0.4 mg, Oral, Daily With Dinner  ticagrelor, 90 mg, Oral, Q12H [address]  traZODone, 50 mg, Oral, HS       potassium chloride (K-DUR,KLOR-CON) CR tablet 40 mEq  Dose: 40 mEq  Freq: Once Route: PO  Start: 07/05/23 1130 End: 07/05/23 1230       Continuous IV Infusions: None. PRN Meds:      acetaminophen, 975 mg, Oral, TID PRN  albuterol, 1 puff, Inhalation, Q4H PRN  hydrALAZINE, 5 mg, Intravenous, Q6H PRN        Discharge Plan: D          Network Utilization Review Department  ATTENTION: Please call with any questions or concerns to [phone] and carefully listen to the prompts so that you are directed to the right person. All voicemails are confidential.  Sangeeta Anthony all requests for admission clinical reviews, approved or denied determinations and any other requests to dedicated fax number below belonging to the campus where the patient is receiving treatment.  List of dedicated fax numbers for the Facilities:  [place] DENIALS (Administrative/Medical Necessity) [phone] [facility] (Maternity/NICU/Pediatrics) [phone]   [address] [place] [address] Drive [phone]   [facility] 1000 [facility] [phone]   [facility]8 [facility] 207 [facility] 5220 [facility] [address] [address] [facility] [phone]   [address] [facility] 1300 [facility] [address]  [place] [phone]

## 2023-07-18 NOTE — TELEPHONE ENCOUNTER
[facility]- Are there any sooner appointments with Dr. Salvador Santos than 9/13/23? Dr. Salvador Santos is requesting for a sooner appointment.      Thank you,  Phyllis Salas

## 2023-07-18 NOTE — TELEPHONE ENCOUNTER
Post CVA Discharge Follow Up  Hospitalization: 4/26/23-5/12/23, 5/12/23-6/6/23 [facility], 7/1/23-7/12/23    According to chart, patient discharged to [facility] and Nursing. Called facility, reached the patient's nurse, Dianelys. She reports the patient is doing well. Denies any new or worsening stroke-like symptoms. Ambulation / ADLs:  Patient is up and moving with PT. He continues to require assistance with ADLs and transfers. Patient maintained on a regular consistency diet  He remains continent at this time. Medication Review:  Reviewed medications with them. Reports the patient is taking ASA 81 mg once daily and pletal 100 mg 2 times daily before meals. Reports the facility provider discontinued the brilinta 90 mg every 12 hours due to a contraindication with the other blood thinning medications. Reports the facility provider replaced the brilinta with plavix 75 mg once daily. No reported missed doses, medication side effects, or signs of bleeding. Advised Dianelys this RN will notify the provider for their recommendations. Last reported /72    Appointments:  Patient was previously scheduled a 6 month F/u with Waqas Lane on 9/1/23 and a follow up with Dr. Bob Rivera on 9/13/23. Will message the providers to see if the patient needs to keep both appointments. There is no estimated discharge date at this time.

## 2023-07-18 NOTE — TELEPHONE ENCOUNTER
Yes I'm ok with that, but why is taking on aspirin, plavix and pletal, maybe we should stop pletal? I'm ok with one appt, does not need both

## 2023-07-19 ENCOUNTER — TELEPHONE (OUTPATIENT)
Dept: NEUROLOGY | Facility: CLINIC | Age: 63
End: 2023-07-19

## 2023-07-19 NOTE — TELEPHONE ENCOUNTER
Called patient and spoke with his wife to schedule an appointment Dr. Tiana Erickson sooner, per provider request. Wife asked if if it could be done tomorrow, she was driving at the time of the phone call. Will call patient tomorrow to schedule.

## 2023-07-20 ENCOUNTER — CONSULT (OUTPATIENT)
Dept: CARDIOLOGY CLINIC | Facility: CLINIC | Age: 63
End: 2023-07-20
Payer: COMMERCIAL

## 2023-07-20 VITALS
HEIGHT: 68 IN | WEIGHT: 195 LBS | SYSTOLIC BLOOD PRESSURE: 112 MMHG | HEART RATE: 77 BPM | DIASTOLIC BLOOD PRESSURE: 68 MMHG | BODY MASS INDEX: 29.55 KG/M2

## 2023-07-20 DIAGNOSIS — I63.9 CEREBROVASCULAR ACCIDENT (CVA), UNSPECIFIED MECHANISM (HCC): ICD-10-CM

## 2023-07-20 DIAGNOSIS — E11.39 TYPE 2 DIABETES MELLITUS WITH OTHER DIABETIC OPHTHALMIC COMPLICATION (HCC): ICD-10-CM

## 2023-07-20 DIAGNOSIS — Z95.818 PRESENCE OF OTHER CARDIAC IMPLANTS AND GRAFTS: Primary | ICD-10-CM

## 2023-07-20 PROCEDURE — 99244 OFF/OP CNSLTJ NEW/EST MOD 40: CPT | Performed by: INTERNAL MEDICINE

## 2023-07-20 NOTE — TELEPHONE ENCOUNTER
Denny Murphy moved up the appointment with Dr. Jamee Weston from 9/13/23 to 8/2/23. Denny Murphy called the patient's wife yesterday and today. Liza Cortez PA-C said it is okay to cancel the 9/1/23 appointment since the patient will be following up with Dr. Jamee Weston.    Patient is currently residing at [facility] and [address] [place] [address]. Called facility to confirm the appointment changes. Spoke with anibal Landaverde. Advised her the 9/1/23 appointment will be canceled and how the new appointment will be on 8/2/23 with Dr. Jamee Weston. Provided date/time/location/provider of appointment. Confirmed details. She expressed understanding and she will relay the changes with the nursing staff.

## 2023-07-21 PROCEDURE — 93000 ELECTROCARDIOGRAM COMPLETE: CPT | Performed by: INTERNAL MEDICINE

## 2023-07-21 NOTE — PROGRESS NOTES
HEART AND VASCULAR  CARDIAC [address]    Outpatient New Consult  Today's Date: 07/21/23        Patient name: Mickie Martin  YOB: 1960  Sex: male         Chief Complaint: Loop end of service      ASSESSMENT:  Problem List Items Addressed This Visit        Endocrine    Type 2 diabetes mellitus with other diabetic ophthalmic complication ([address])       Cardiovascular and Mediastinum    Stroke [facility])    Relevant Orders    Echo complete w/ contrast if indicated   Other Visit Diagnoses     Presence of other cardiac implants and grafts    -  Primary    Relevant Orders    POCT ECG          57 yo male  1) Multiple strokes, has Carotid disease, loop >1years old w/o any afib, end of service. He failed Eliquis already w recent stroke, now on Brilenta  2) No structural heart disease on prior echo other than mild aortics sclerosis    PLAN:  1) Do not recommend reimplant of loop. Discussed elective non urgent explant, explained the loop unlikely to cause any hard in near future. 2) Check TTE to f/u on aortic slerosis    Follow up in: prn  Orders Placed This Encounter   Procedures   • POCT ECG   • Echo complete w/ contrast if indicated     There are no discontinued medications. ............................................................................................. HPI/Subjective:   57 yo male  1) Multiple strokes, has Carotid disease, loop >1years old w/o any afib, end of service. He failed Eliquis already w recent stroke, now on Brilenta  2) No structural heart disease on prior echo other than mild aortics sclerosis    The patient is fairly debilitated from prior strokes, wife assumes, care, he some communication defecits, and also is in wheelchair, ambulation dysfunction. They have not seen cardiology since loop implanted. Seeing vascular soon for carotid disease.      Please note HPI is listed by problem with with update following it, it is copied again in the assessment above and reflects medical decision making as well. Complete 12 point ROS reviewed and otherwise non pertinent or negative except as per HPI pertinent positives in Cardiovascular and Respiratory emphasized. Please see paper chart for outpatient clinic patients where the patient completed the 12 point ROS survey. Past Medical History:   Diagnosis Date   • Depression    • Diabetes mellitus ([address])     patient denies   • Dyslipidemia 03/26/2019   • Hyperlipidemia    • Hypertension    • Stroke (HCC)        Allergies   Allergen Reactions   • Flexeril [Cyclobenzaprine] Drowsiness   • Lisinopril Cough   • Nsaids Confusion     I reviewed the Home Medication list and Allergies in the chart.    Scheduled Meds:  Current Outpatient Medications   Medication Sig Dispense Refill   • acetaminophen (TYLENOL) 325 mg tablet Take 3 tablets (975 mg total) by mouth 3 (three) times a day as needed for mild pain, headaches or fever  0   • aspirin (ECOTRIN LOW STRENGTH) 81 mg EC tablet Take 1 tablet (81 mg total) by mouth daily Do not start before April 19, 2023. 30 tablet 0   • atorvastatin (LIPITOR) 40 mg tablet Take 40 mg by mouth daily with breakfast     • baclofen 5 MG TABS Take 5 mg by mouth 2 (two) times a day as needed for muscle spasms  0   • cilostazol (PLETAL) 100 mg tablet Take 1 tablet (100 mg total) by mouth 2 (two) times a day before meals Do not start before June 6, 2023.  0   • citalopram (CeleXA) 20 mg tablet Take 20 mg by mouth daily     • donepezil (ARICEPT) 10 mg tablet Take 1 tablet (10 mg total) by mouth in the morning 60 tablet 2   • lidocaine (Lidoderm) 5 % Apply 1 patch topically over 12 hours daily Remove & Discard patch within 12 hours or as directed by MD 30 patch 2   • losartan (COZAAR) 50 mg tablet Take 50 mg by mouth daily     • metoprolol succinate (TOPROL-XL) 100 mg 24 hr tablet Take 100 mg by mouth daily     • pantoprazole (PROTONIX) 40 mg tablet Take 1 tablet (40 mg total) by mouth daily 90 tablet 2   • potassium chloride (K-DUR,KLOR-CON) 20 mEq tablet Take 2 tablets (40 mEq total) by mouth daily for 15 days Do not start before July 13, 2023. 30 tablet 0   • tamsulosin (FLOMAX) 0.4 mg Take 1 capsule (0.4 mg total) by mouth daily with dinner  0   • ticagrelor (BRILINTA) 90 MG Take 1 tablet (90 mg total) by mouth every 12 (twelve) hours  0   • traZODone (DESYREL) 50 mg tablet Take 1 tablet (50 mg total) by mouth daily at bedtime Do not start before June 6, 2023.  0     No current facility-administered medications for this visit. PRN Meds:.        Family History   Problem Relation Age of Onset   • Heart attack Mother    • Diabetes Mother    • Prostate cancer Father        Social History     Socioeconomic History   • Marital status: /Civil Union     Spouse name: Not on file   • Number of children: Not on file   • Years of education: Not on file   • Highest education level: Not on file   Occupational History   • Not on file   Tobacco Use   • Smoking status: Former   • Smokeless tobacco: Never   Vaping Use   • Vaping Use: Former   Substance and Sexual Activity   • Alcohol use: Never   • Drug use: Never   • Sexual activity: Not on file   Other Topics Concern   • Not on file   Social History Narrative   • Not on file     Social Determinants of Health     Financial Resource Strain: Not on file   Food Insecurity: No Food Insecurity (7/3/2023)    Hunger Vital Sign    • Worried About Running Out of Food in the Last Year: Never true    • Ran Out of Food in the Last Year: Never true   Transportation Needs: No Transportation Needs (7/3/2023)    PRAPARE - Transportation    • Lack of Transportation (Medical): No    • Lack of Transportation (Non-Medical):  No   Physical Activity: Not on file   Stress: Not on file   Social Connections: Not on file   Intimate Partner Violence: Not on file   Housing Stability: Low Risk  (7/3/2023)    Housing Stability Vital Sign    • Unable to Pay for Housing in the Last Year: No    • Number of Places Lived in the Last Year: 1    • Unstable Housing in the Last Year: No         OBJECTIVE:    /68 (BP Location: Left arm, Patient Position: Sitting, Cuff Size: Large)   Pulse 77   Ht 5' 8" (1.727 m)   Wt 88.5 kg (195 lb)   BMI 29.65 kg/m²   Vitals:    07/20/23 1359   Weight: 88.5 kg (195 lb)     GEN: No acute distress, Alert and oriented, well appearing  HEENT:External ears normal, oral pharynx clear, mucous membranes moist  EYES: Pupils equal, sclera anicteric, midline, normal conjuctiva  NECK: No JVD, supple, no obvious masses or thryomegaly or goiter  CARDIOVASCULAR:  RRR, No murmur, rub, gallops S1,S2  LUNGS: Clear To auscultation bilaterally, normal effort, no rales, rhonchi, crackles   ABDOMEN:  nondistended,  without obvious organomegaly or ascites  EXTREMITIES/VASCULAR:  No edema. warm an well perfused. GAIT:  In wheelchair  HEME: Has some bruises on arms  SKIN: No significant rashes on visibile skin, warm, no diaphoresis or pallor.      Lab Results:       LABS:      Chemistry        Component Value Date/Time    K 3.5 07/12/2023 0508     (H) 07/12/2023 0508    CO2 24 07/12/2023 0508    CO2 26 04/16/2019 1418    BUN 15 07/12/2023 0508    CREATININE 0.92 07/12/2023 0508        Component Value Date/Time    CALCIUM 9.0 07/12/2023 0508    ALKPHOS 80 07/10/2023 0644    AST 12 (L) 07/10/2023 0644    ALT 15 07/10/2023 0644            No results found for: "CHOL"  Lab Results   Component Value Date    HDL 30 (L) 07/02/2023    HDL 36 (L) 04/27/2023    HDL 33 (L) 04/17/2023     Lab Results   Component Value Date    LDLCALC 30 07/02/2023    LDLCALC 40 04/27/2023    LDLCALC 72 04/17/2023     Lab Results   Component Value Date    TRIG 168 (H) 07/02/2023    TRIG 166 (H) 04/27/2023    TRIG 176 (H) 04/17/2023     No results found for: "CHOLHDL"    IMAGING: EEG awake or drowsy routine    Result Date: 7/5/2023  Narrative: Table formatting from the original result was not included. ELECTROENCEPHALOGRAM Patient Name:  Miguel Drew  MRN: [id number] :  1960 File #: [id number] [place] [id number] Date performed: 2023  Referring Provider: Trini Angelo MD      Report date: 2023      Study type: awake and drowsy EEG ICD 10 diagnosis: Convulsion/Paroxysmal Spells/Fit NOS R56.9 Patient History: EEG is requested to assess for seizures and/or classification of epilepsy. Patient is 58 y.o. male with a stroke to the left temporal-parietal-occipital region. He presents with seizure-like activity. Current AEDs: Medications include: Facility-Administered Medications Ordered in Other Visits Medication Dose Route Frequency Provider Last Rate • acetaminophen  975 mg Oral TID PRN Dali Kiser MD   • albuterol  1 puff Inhalation Q4H PRN Dali Kiser MD   • aspirin  81 mg Oral Daily Dali Kiser MD   • atorvastatin  40 mg Oral Daily With Breakfast Dali Kiser MD   • baclofen  5 mg Oral Q12H Jazzmine Finch PA-C   • cilostazol  100 mg Oral BID AC Dali Kiser MD   • donepezil  10 mg Oral HS Jazzmine Finch PA-C   • heparin (porcine)  5,000 Units Subcutaneous Q8H [facility] Dali Kiser MD   • hydrALAZINE  5 mg Intravenous Q6H PRN Oriana Cuenca MD   • lidocaine  1 patch Topical Daily Jazzmine Finch PA-C   • metoprolol succinate  100 mg Oral Daily Dali Kiser MD   • pantoprazole  40 mg Oral Daily Dali Kiser MD   • polyethylene glycol  17 g Oral QAM Dali Kiser MD   • senna-docusate sodium  2 tablet Oral BID Dali Kiser MD   • tamsulosin  0.4 mg Oral Daily With Misael Enriquez MD   • ticagrelor  90 mg Oral Q12H [facility] Dali Kiser MD   • traZODone  50 mg Oral HS Dali Kiser MD   Description of Procedure: The EEG was performed with electrodes applied using the International 10-20 System. Additional electrodes used included EOG, ECG and T1/T2 electrodes.   A single lead ECG channel is also present. At least 16 channels are reviewed at a time and formatted into longitudinal bipolar, transverse bipolar, and referential (to common reference or calculated common reference) montages. The EEG was recorded with the patient awake. The recording was technically satisfactory. EEG was recorded from 11:31 to 11:59. Findings: Background Activity: The background is asymmetric due to attentuation of faster frequencies over the left hemisphere (maximally over the left frontotemporal region). During wakefulness, the background is fairly organized with anterior low amplitude alpha-theta activity and posterior low-moderate amplitude theta activity. There is a symmetric 6-7 Hz posterior dominant rhythm that attenuates with eye opening. There is intermittent polymorphic moderate voltage 2-4 Hz delta activity. Activation Procedures: Hyperventilation was not performed. Stepped photic stimulation from 1 to 30 fps was performed and produced no abnormality. Abnormal Findings: There is nearly continuous moderate voltage polymorphic 1-2 Hz delta activity over the left temporal region. Other findings: The single lead ECG shows a regular and sinus rhythm. Interpretation: This is an abnormal 28 minutes awake EEG due to nearly continuous polymorphic delta activity over the left frontotemporal region, attenuation of faster activity over the left hemisphere, slow posterior rhythm (theta range), and intermittent diffuse delta activity. These findings  Indicate the presence of a structural lesion over the left frontotemporal region superimposed on mild-moderate diffuse cerebral dysfunction. Rianna Nance MD PhD [facility]      XR shoulder 2+ vw left    Result Date: 7/5/2023  Narrative: RIGHT SHOULDER INDICATION:   pain, dec rom. COMPARISON:  None VIEWS:  XR SHOULDER 2+ VW RIGHT, XR SHOULDER 2+ VW LEFT FINDINGS: There is no acute fracture or dislocation.  There are mild multifocal productive changes. There is approximately 3 mm inferior spurring in bilateral AC joints. There is significant shortening of the acromiohumeral interval on the left, compatible with rotator cuff tendinopathy. No lytic or blastic osseous lesion. Soft tissues are unremarkable. Impression: No acute osseous abnormality. Mild multifocal productive changes. Left rotator cuff tendinopathy. Workstation performed: [id number]     XR shoulder 2+ vw right    Result Date: 7/5/2023  Narrative: RIGHT SHOULDER INDICATION:   pain, dec rom. COMPARISON:  None VIEWS:  XR SHOULDER 2+ VW RIGHT, XR SHOULDER 2+ VW LEFT FINDINGS: There is no acute fracture or dislocation. There are mild multifocal productive changes. There is approximately 3 mm inferior spurring in bilateral AC joints. There is significant shortening of the acromiohumeral interval on the left, compatible with rotator cuff tendinopathy. No lytic or blastic osseous lesion. Soft tissues are unremarkable. Impression: No acute osseous abnormality. Mild multifocal productive changes. Left rotator cuff tendinopathy. Workstation performed: [id number]     MRI brain wo contrast    Result Date: 7/2/2023  Narrative: MRI BRAIN WITHOUT CONTRAST INDICATION: vision changes with prior hx of stroke. COMPARISON:   CT from yesterday. MRI dated 5/30/2023. TECHNIQUE:  Multiplanar, multisequence imaging of the brain was performed. IMAGE QUALITY:  Diagnostic. FINDINGS: BRAIN PARENCHYMA: Redemonstration of evolving infarct in the left parietal lobe and left occipital lobe with residual but improving DWI signal that is now mostly normalized on ADC. No acute infarct. No acute hemorrhage, mass, mass effect or herniation. Stable focal encephalomalacia in the right frontal lobe. Stable T2/FLAIR hyperintensity in the periventricular and subcortical white matter due to moderate chronic microangiopathy. Chronic lacunar infarcts in the right corona radiata and left basal ganglia.  Stable small focus of chronic microhemorrhage in the right parietal lobe. VENTRICLES: Stable ventricular enlargement out of proportion to degree of cortical volume loss, again suggest correlation for NPH. SELLA AND PITUITARY GLAND:  Normal. ORBITS:  Normal. PARANASAL SINUSES:  Normal. VASCULATURE:  Evaluation of the major intracranial vasculature demonstrates appropriate flow voids. CALVARIUM AND SKULL BASE:  Normal. EXTRACRANIAL SOFT TISSUES:  Normal.     Impression: Redemonstration of evolving left parietal/occipital infarct with residual but improving DWI signal that is now mostly normalized on ADC. No acute infarct or hemorrhage. Stable ventricular prominence, correlate for NPH. Workstation performed: [id number]     CT head wo contrast    Result Date: 7/1/2023  Narrative: CT BRAIN - WITHOUT CONTRAST INDICATION:   headache, recent stroke. rule out ICH. Headache for 1 week; patient more tired COMPARISON: CT from 5/31/2023; MRI from 5/30/2023. TECHNIQUE:  CT examination of the brain was performed. Multiplanar 2D reformatted images were created from the source data. Radiation dose length product (DLP) for this visit:  936.43 mGy-cm . This examination, like all CT scans performed in the [facility], was performed utilizing techniques to minimize radiation dose exposure, including the use of iterative  reconstruction and automated exposure control. IMAGE QUALITY:  Diagnostic. FINDINGS: PARENCHYMA:  No intracranial mass, mass effect or midline shift. No CT signs of acute infarction. No acute parenchymal hemorrhage. Patchy areas of diminished white matter attenuation is most likely related to microvascular ischemic change. There is evidence of maturing left occipital parietal infarct with some cortical thinning now developing as compared to the study of May 31. Encephalomalacia also appears to extend slightly superior into the parietal lobe.  VENTRICLES AND EXTRA-AXIAL SPACES: There is ventriculomegaly as compared to the degree of sulcal atrophy but similar to the prior study. The atria on the right is somewhat more pronounced than the left. VISUALIZED ORBITS: Normal visualized orbits. PARANASAL SINUSES: Normal visualized paranasal sinuses. CALVARIUM AND EXTRACRANIAL SOFT TISSUES:  Normal.     Impression: Evidence of maturing left occipital parietal infarct. There is increased encephalomalacia. No new hemorrhage. Ventriculomegaly is disproportionate and could represent normal pressure hydrocephalus. Workstation performed: [id number]        Cardiac testing:   Results for orders placed during the hospital encounter of 19    Echo complete with contrast if indicated    Narrative  [address] [place] [address]  [phone]    Transthoracic Echocardiogram  2D, M-mode, Doppler, and Color Doppler    Study date:  19-Mar-2019    Patient: Shelby Carter  MR number: [id number]  Account number: [de-identified]  : 1960  Age: 60 years  Gender: Male  Status: Inpatient  Location: Bedside  Height: 66 in  Weight: 212.5 lb  BP: 172/ 78 mmHg    Indications: Evaluate for suspected cardiac source of embolism. Diagnoses: G45.9 - Transient cerebral ischemic attack, unspecified    Sonographer:  Isabel Johnson RDCS  Interpreting Physician:  Geraldo Vines MD  Primary Physician:  Tessa Jeffrey MD  Referring Physician:  Anthony Beard MD  Group:  [facility]  Cardiology Fellow:  Mallorie Latif MD    SUMMARY    LEFT VENTRICLE:  Systolic function was normal. Ejection fraction was estimated to be 60 %. There were no regional wall motion abnormalities. The changes were consistent with concentric remodeling (increased wall thickness with normal wall mass). RIGHT VENTRICLE:  The size was normal.  Systolic function was normal.    HISTORY: PRIOR HISTORY: hypertension, tobacco abuse    PROCEDURE: The procedure was performed at the bedside.  This was a routine study. The transthoracic approach was used. The study included complete 2D imaging, M-mode, complete spectral Doppler, and color Doppler. The heart rate was 85 bpm,  at the start of the study. Images were obtained from the parasternal, apical, subcostal, and suprasternal notch acoustic windows. Echocardiographic views were limited due to restricted patient mobility and poor patient compliance. This was  a technically difficult study. LEFT VENTRICLE: Size was normal. Systolic function was normal. Ejection fraction was estimated to be 60 %. There were no regional wall motion abnormalities. Wall thickness was at the upper limits of normal. The changes were consistent with  concentric remodeling (increased wall thickness with normal wall mass). DOPPLER: Left ventricular diastolic function parameters were normal.    RIGHT VENTRICLE: The size was normal. Systolic function was normal. Wall thickness was normal.    LEFT ATRIUM: Size was normal.    RIGHT ATRIUM: Size was normal.    MITRAL VALVE: Valve structure was normal. There was normal leaflet separation. DOPPLER: The transmitral velocity was within the normal range. There was no evidence for stenosis. There was no significant regurgitation. AORTIC VALVE: The valve was trileaflet. Leaflets exhibited mildly increased thickness, mild calcification, and normal cuspal separation. DOPPLER: Transaortic velocity was within the normal range. There was no evidence for stenosis. There  was no significant regurgitation. TRICUSPID VALVE: The valve structure was normal. There was normal leaflet separation. DOPPLER: The transtricuspid velocity was within the normal range. There was no evidence for stenosis. There was no significant regurgitation. The  tricuspid jet envelope definition was inadequate for estimation of RV systolic pressure. PULMONIC VALVE: Not well visualized. DOPPLER: The transpulmonic velocity was within the normal range.  There was no significant regurgitation. PERICARDIUM: There was no pericardial effusion. AORTA: The root exhibited normal size. SYSTEMIC VEINS: IVC: The inferior vena cava was normal in size. Respirophasic changes were normal.    SYSTEM MEASUREMENT TABLES    2D  %FS: 25.29 %  Ao Diam: 3.25 cm  EDV(Teich): 75.85 ml  EF(Teich): 50.36 %  ESV(Teich): 37.65 ml  IVSd: 1.12 cm  LA Area: 18.38 cm2  LA Diam: 3.53 cm  LVEDV MOD A4C: 143.17 ml  LVEF MOD A4C: 64.7 %  LVESV MOD A4C: 50.54 ml  LVIDd: 4.14 cm  LVIDs: 3.09 cm  LVLd A4C: 9.67 cm  LVLs A4C: 7.8 cm  LVPWd: 1.28 cm  RA Area: 16.35 cm2  RVIDd: 2.93 cm  SV MOD A4C: 92.63 ml  SV(Teich): 38.19 ml    MM  TAPSE: 2.6 cm    PW  E': 0.07 m/s  E/E': 11.32  MV A Hernando: 1.13 m/s  MV Dec Bremer: 3.78 m/s2  MV DecT: 195.95 ms  MV E Hernando: 0.74 m/s  MV E/A Ratio: 0.66  MV PHT: 56.83 ms  MVA By PHT: 3.87 cm2    [employer] Accredited Echocardiography Laboratory    Prepared and electronically signed by    Governor MD Javi  Signed 20-Mar-2019 09:47:13    Results for orders placed during the hospital encounter of 19    IVY    Narrative  [address] [place] [id number] [phone]    Transesophageal Echocardiogram  2D, Doppler, and Color Doppler    Study date:  21-Mar-2019    Patient: Daniel Bee  MR number: [id number]  Account number: [de-identified]  : 1960  Age: 60 years  Gender: Male  Status: Inpatient  Location: Bedside  Height: 66 in  Weight: 214 lb  BP: 194/ 83 mmHg    Indications: Evaluate for suspected cardiac source of embolism. Diagnoses: I63.9 - Cerebral infarction, unspecified    Sonographer:  Emma Negron RDCS  Interpreting Physician:  Kathrine Cuevas MD  Primary Physician:  ERIC Ireland  Referring Physician:  Jose C Renee MD  Group:  [facility]  Cardiology Fellow:  Angela Laurent MD    IMPRESSIONS:  The study was within normal limits.  There was no diagnostic echocardiographic evidence for a cardiac source of embolism. SUMMARY    LEFT VENTRICLE:  Systolic function was normal. Ejection fraction was estimated to be 60 %. There were no regional wall motion abnormalities. The changes were consistent with concentric remodeling (increased wall thickness with normal wall mass). Doppler parameters were consistent with abnormal left ventricular relaxation (grade 1 diastolic dysfunction). ATRIAL SEPTUM:  There was increased thickness of the septum, consistent with lipomatous hypertrophy. No defect or patent foramen ovale was identified. A bubble study was performed. There was no right-to-left shunt, in the baseline state. MITRAL VALVE:  There was trace regurgitation. AORTIC VALVE:  The valve was trileaflet. Leaflets exhibited mildly increased thickness, mild calcification, and sclerosis. Transaortic velocity was increased due to increased transvalvular flow. Valve mean gradient was 9 mmHg. HISTORY: PRIOR HISTORY: Hypertension; tobacco abuse    PROCEDURE: The procedure was performed at the bedside. This was a routine study. The risks and alternatives of the procedure were explained to the patient and informed consent was obtained. The transesophageal approach was used. The study  included complete 2D imaging, complete spectral Doppler, and color Doppler. The heart rate was 96 bpm, at the start of the study. An adult omniplane probe was inserted by the attending cardiologist. Intubated with ease. One intubation  attempt(s). There was no blood detected on the probe. Image quality was adequate. There were no complications during the procedure. MEDICATIONS: Anesthesia administered by anesthesia team.    LEFT VENTRICLE: Size was normal. Systolic function was normal. Ejection fraction was estimated to be 60 %. There were no regional wall motion abnormalities. Wall thickness was mildly increased.  The changes were consistent with concentric  remodeling (increased wall thickness with normal wall mass). No evidence of apical thrombus. DOPPLER: Doppler parameters were consistent with abnormal left ventricular relaxation (grade 1 diastolic dysfunction). RIGHT VENTRICLE: The size was normal. Systolic function was normal. Wall thickness was normal.    LEFT ATRIUM: Size was normal. No thrombus was identified. APPENDAGE: The appendage was small. No thrombus was identified. DOPPLER: The function was mildly reduced (mildly reduced emptying velocity). ATRIAL SEPTUM: There was increased thickness of the septum, consistent with lipomatous hypertrophy. No defect or patent foramen ovale was identified. A bubble study was performed. There was no right-to-left shunt, in the baseline state. RIGHT ATRIUM: Size was normal.    MITRAL VALVE: Valve structure was normal. There was normal leaflet separation. There was systolic bowing, but without diagnostic evidence for prolapse. DOPPLER: The transmitral velocity was within the normal range. There was no evidence  for stenosis. There was trace regurgitation. AORTIC VALVE: The valve was trileaflet. Leaflets exhibited mildly increased thickness, mild calcification, and sclerosis. DOPPLER: Transaortic velocity was increased due to increased transvalvular flow. There was no evidence for stenosis. There was no significant regurgitation. TRICUSPID VALVE: The valve structure was normal. There was normal leaflet separation. DOPPLER: There was trace regurgitation. PULMONIC VALVE: Leaflets exhibited normal thickness, no calcification, and normal cuspal separation. DOPPLER: The transpulmonic velocity was within the normal range. There was no significant regurgitation. PERICARDIUM: There was no pericardial effusion. AORTA: The root exhibited normal size. The ascending aorta was normal in size. The aortic arch was normal in size. The descending aorta was normal in size. There was no atheroma. There was no evidence for dissection.  There was no evidence  for aneurysm. SYSTEMIC VEINS: IVC: The inferior vena cava was normal in size. MEASUREMENT TABLES    DOPPLER MEASUREMENTS  Aortic valve   (Reference normals)  Peak ellen   235 cm/s   (--)  Mean ellen   150 cm/s   (--)  VTI   39 cm   (--)  Peak gradient   22 mmHg   (--)  Mean gradient   9 mmHg   (--)    [facility]    Prepared and electronically signed by    Adilene Frazier MD  Signed 21-Mar-2019 17:22:41    No results found for this or any previous visit. No results found for this or any previous visit. Reviewed prior loop recordings, no afib.

## 2023-07-24 DIAGNOSIS — G91.2 NPH (NORMAL PRESSURE HYDROCEPHALUS) (HCC): Primary | ICD-10-CM

## 2023-07-24 NOTE — PROGRESS NOTES
See recent [address] [facility] referral. Per Dr. Cesario Cruz patient will need CT head to be completed prior to already schedule follow-up. Placed order as needed.

## 2023-07-26 ENCOUNTER — TELEPHONE (OUTPATIENT)
Dept: NEUROLOGY | Facility: CLINIC | Age: 63
End: 2023-07-26

## 2023-07-26 NOTE — TELEPHONE ENCOUNTER
Spoke with patient wife and confirmed appointment with . Patient wife mentioned he is still in rehab but will most likely be discharged. Patient wife would also like to be called during the appointment.

## 2023-08-02 ENCOUNTER — TELEPHONE (OUTPATIENT)
Dept: NEUROLOGY | Facility: CLINIC | Age: 63
End: 2023-08-02

## 2023-08-02 ENCOUNTER — OFFICE VISIT (OUTPATIENT)
Dept: NEUROLOGY | Facility: CLINIC | Age: 63
End: 2023-08-02
Payer: COMMERCIAL

## 2023-08-02 VITALS — DIASTOLIC BLOOD PRESSURE: 80 MMHG | HEART RATE: 90 BPM | SYSTOLIC BLOOD PRESSURE: 150 MMHG

## 2023-08-02 DIAGNOSIS — G91.2 NPH (NORMAL PRESSURE HYDROCEPHALUS) (HCC): ICD-10-CM

## 2023-08-02 DIAGNOSIS — I66.01 MIDDLE CEREBRAL ARTERY STENOSIS, RIGHT: Primary | ICD-10-CM

## 2023-08-02 DIAGNOSIS — H53.9 VISION ABNORMALITIES: ICD-10-CM

## 2023-08-02 DIAGNOSIS — I65.23 BILATERAL CAROTID ARTERY STENOSIS: ICD-10-CM

## 2023-08-02 DIAGNOSIS — I69.30 CHRONIC ISCHEMIC RIGHT MCA STROKE: ICD-10-CM

## 2023-08-02 DIAGNOSIS — I63.9 CEREBROVASCULAR ACCIDENT (CVA), UNSPECIFIED MECHANISM (HCC): ICD-10-CM

## 2023-08-02 DIAGNOSIS — I69.30 CHRONIC ISCHEMIC LEFT MCA STROKE: ICD-10-CM

## 2023-08-02 PROCEDURE — 99215 OFFICE O/P EST HI 40 MIN: CPT | Performed by: PSYCHIATRY & NEUROLOGY

## 2023-08-02 NOTE — TELEPHONE ENCOUNTER
Pts wife is asking for a call when pt has appt so she is aware of what is going. His appt is today at 4:30.

## 2023-08-02 NOTE — PROGRESS NOTES
Patient ID: Ilene Gage is a 58 y.o. male. Assessment/Plan:    68-year-old male with history of prior left MCA stroke status post left carotid stent, right carotid stenosis, had a loop recorder placed for 3 years which did not show any evidence of A-fib which makes cardioembolic etiology less likely. He is having episodes where he is zoning, and he remembers having them, and he does not hear, or seeing during these episodes, and he went to the ER and he was not responding. MRI brain was done which I personally reviewed the images of which does not show any evidence of new strokes. Etiology of the these spells are unclear, could be seizures, but hard to say, some of them are lasting for 4-5 hours. TA head and neck showed severe stenosis proximal left M2, severe stenosis distal right M1 and 60 to 65% stenosis bilateral extracranial intracranial arteries. Plan:     Diagnoses and all orders for this visit:    Middle cerebral artery stenosis, right  -P2y12 assay check  -     Ambulatory Referral to Ophthalmology; Future  -     P2Y12 Platelet inhibitor; Future    Cerebrovascular accident (CVA), unspecified mechanism ([address])  Etiology of the CVA -   -for stroke prevention continue with combination of aspirin, brilinta and atorvastatin  -BP goal < 130/80, BP is at goal in the office. -LDL goal <70  -I advised patient to avoid using NSAIDs for headaches or other pain and to stick to tylenol if needed  -Recommend mediterranean diet & regular exercise regimen atleast 4-5 times a week for 20-30 minutes. -I educated patient/family regarding medication compliance  -encourage smoking cessation, compliance with medication, and control of diabetes; defer management to primary  -     Ambulatory referral to Neurology    Chronic ischemic right MCA stroke    Chronic ischemic left MCA stroke    Vision abnormalities  -ophthalmology referral   -     Ambulatory Referral to Ophthalmology;  Future    Possible NPH (normal pressure hydrocephalus) ([address])  -recommend PT referral.     Bilateral carotid artery stenosis    Other orders  -     metFORMIN (GLUCOPHAGE) 500 mg tablet       Follow up - follow up with epilepsy team next available and follow up with me in 4 months. I would be happy to see the patient sooner if any new questions/concerns arise. Patient/Guardian was advised to the call the office if they have any questions and concerns in the meantime. Patient/Guardian does understand that if they have any new stroke like symptoms such as facial droop on one side, weakness/paralysis on either side, speech trouble, numbness on one side, balance issues, any vision changes, extreme dizziness or any new headache, to call 9-1-1 immediately or to proceed to the nearest ER immediately. I have spent a total time of 40 minutes on 08/02/23 in caring for this patient including Risks and benefits of tx options, Instructions for management, Patient and family education, Importance of tx compliance, Counseling / Coordination of care and Documenting in the medical record. Subjective:    HPI     This is a 76-year-old  male with history of prior left MCA stroke status post left carotid stent, right carotid stenosis, had a loop recorder placed for 3 years which did not show any evidence of A-fib which makes cardioembolic etiology less likely. He is having episodes where he is zoning out, and he remembers having them but he does not hear, or see during these episodes, and he went to the ER and he was not responding as much. He was brought to the ER 7/2/2023 with the symptoms and presented with a fall, no loss of consciousness he says he has had multiple falls at home since coming home from rehab. They were concerned about initial seizure. MRI brain did not reveal any evidence of any strokes, showed the DWI change but it is improving.   There was a concern for NPH    He is having spells (staring) and zoning out, and sometimes these can last for 4 to 5 hours he does not necessarily remember having them sometimes. States that this is happening almost daily. Hes had sometimes feels like his legs give out and will fall he has had multiple falls as a result of this. The following portions of the patient's history were reviewed and updated as appropriate:   He  has a past medical history of Depression, Diabetes mellitus ([address]), Dyslipidemia (03/26/2019), Hyperlipidemia, Hypertension, and Stroke ([address]).   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 complication ([address]) 42/05/7336   • Aphasia 06/06/2023   • Atherosclerosis of native arteries of extremities with intermittent claudication, bilateral legs ([address]) 06/06/2023   • Benign prostatic hyperplasia with lower urinary tract symptoms 06/06/2023   • Possible NPH (normal pressure hydrocephalus) ([address]) 06/02/2023   • Muscle spasms of both lower extremities 06/02/2023   • Multiple thyroid nodules 06/01/2023   • Abnormal laboratory test 05/15/2023   • History of tobacco use 05/12/2023   • Chronic ischemic left MCA stroke 05/12/2023   • Hypokalemia 05/12/2023   • Infrarenal abdominal aortic aneurysm (AAA) without rupture ([address]) 05/08/2023   • Tachycardia 05/05/2023   • Prediabetes 04/27/2023   • SIRS (systemic inflammatory response syndrome) ([address]) 04/27/2023   • Chronic anticoagulation 04/26/2023   • Stroke ([address]) 04/26/2023   • Hyponatremia 04/26/2023   • Middle cerebral artery stenosis, right 04/17/2023   • Left posterior MCA stroke - etiology unclear at this time 04/17/2023   • Chronic low back pain 04/16/2023   • Hypertensive encephalopathy, transient 01/12/2023   • Snoring 08/10/2020   • Memory deficits 08/10/2020   • Chronic ischemic right MCA stroke 08/06/2019   • Status post placement of implantable loop recorder 04/06/2019   • Type 2 diabetes mellitus ([address]) 04/03/2019   • Anxiety and depression 03/29/2019   • Insomnia 03/29/2019 • Fall 03/28/2019   • 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 His family history includes Diabetes in his mother; Heart attack in his mother; Prostate cancer in his father. He  reports that he has quit smoking. He has never used smokeless tobacco. He reports that he does not drink alcohol and does not use drugs.   Current Outpatient Medications   Medication Sig Dispense Refill   • acetaminophen (TYLENOL) 325 mg tablet Take 3 tablets (975 mg total) by mouth 3 (three) times a day as needed for mild pain, headaches or fever  0   • aspirin (ECOTRIN LOW STRENGTH) 81 mg EC tablet Take 1 tablet (81 mg total) by mouth daily Do not start before April 19, 2023. 30 tablet 0   • atorvastatin (LIPITOR) 40 mg tablet Take 40 mg by mouth daily with breakfast     • baclofen 5 MG TABS Take 5 mg by mouth 2 (two) times a day as needed for muscle spasms  0   • cilostazol (PLETAL) 100 mg tablet Take 1 tablet (100 mg total) by mouth 2 (two) times a day before meals Do not start before June 6, 2023.  0   • citalopram (CeleXA) 20 mg tablet Take 20 mg by mouth daily     • donepezil (ARICEPT) 10 mg tablet Take 1 tablet (10 mg total) by mouth in the morning 60 tablet 2   • lidocaine (Lidoderm) 5 % Apply 1 patch topically over 12 hours daily Remove & Discard patch within 12 hours or as directed by MD 30 patch 2   • losartan (COZAAR) 50 mg tablet Take 50 mg by mouth daily     • metoprolol succinate (TOPROL-XL) 100 mg 24 hr tablet Take 100 mg by mouth daily     • pantoprazole (PROTONIX) 40 mg tablet Take 1 tablet (40 mg total) by mouth daily 90 tablet 2   • tamsulosin (FLOMAX) 0.4 mg Take 1 capsule (0.4 mg total) by mouth daily with dinner  0   • ticagrelor (BRILINTA) 90 MG Take 1 tablet (90 mg total) by mouth every 12 (twelve) hours  0   • traZODone (DESYREL) 50 mg tablet Take 1 tablet (50 mg total) by mouth daily at bedtime Do not start before June 6, 2023.  0   • metFORMIN (GLUCOPHAGE) 500 mg tablet      • potassium chloride (K-DUR,KLOR-CON) 20 mEq tablet Take 2 tablets (40 mEq total) by mouth daily for 15 days Do not start before July 13, 2023. 30 tablet 0     No current facility-administered medications for this visit. Current Outpatient Medications on File Prior to Visit   Medication Sig   • acetaminophen (TYLENOL) 325 mg tablet Take 3 tablets (975 mg total) by mouth 3 (three) times a day as needed for mild pain, headaches or fever   • aspirin (ECOTRIN LOW STRENGTH) 81 mg EC tablet Take 1 tablet (81 mg total) by mouth daily Do not start before April 19, 2023. • atorvastatin (LIPITOR) 40 mg tablet Take 40 mg by mouth daily with breakfast   • baclofen 5 MG TABS Take 5 mg by mouth 2 (two) times a day as needed for muscle spasms   • cilostazol (PLETAL) 100 mg tablet Take 1 tablet (100 mg total) by mouth 2 (two) times a day before meals Do not start before June 6, 2023.    • citalopram (CeleXA) 20 mg tablet Take 20 mg by mouth daily   • donepezil (ARICEPT) 10 mg tablet Take 1 tablet (10 mg total) by mouth in the morning   • lidocaine (Lidoderm) 5 % Apply 1 patch topically over 12 hours daily Remove & Discard patch within 12 hours or as directed by MD   • losartan (COZAAR) 50 mg tablet Take 50 mg by mouth daily   • metoprolol succinate (TOPROL-XL) 100 mg 24 hr tablet Take 100 mg by mouth daily   • pantoprazole (PROTONIX) 40 mg tablet Take 1 tablet (40 mg total) by mouth daily   • tamsulosin (FLOMAX) 0.4 mg Take 1 capsule (0.4 mg total) by mouth daily with dinner   • ticagrelor (BRILINTA) 90 MG Take 1 tablet (90 mg total) by mouth every 12 (twelve) hours   • traZODone (DESYREL) 50 mg tablet Take 1 tablet (50 mg total) by mouth daily at bedtime Do not start before June 6, 2023. • metFORMIN (GLUCOPHAGE) 500 mg tablet    • potassium chloride (K-DUR,KLOR-CON) 20 mEq tablet Take 2 tablets (40 mEq total) by mouth daily for 15 days Do not start before July 13, 2023. No current facility-administered medications on file prior to visit. He is allergic to flexeril [cyclobenzaprine], lisinopril, and nsaids. .         Objective:    Blood pressure 150/80, pulse 90. Physical Exam  General - patient is alert   Speech - no dysarthria noted, no aphasia noted. Neuro:   Cranial nerves: PERRL, EOMI, facial sensation intact to soft touch in V1, V2 and V3, no facial asymmetry noted, uvula/palate midline, tongue midline. Motor: 5/5 throughout, normal tone, no pronator drift noted. Sensory - intact to soft touch throughout  Reflexes - 2+ throughout  Coordination - no ataxia/dysmetria noted  Gait - normal     ROS:  Reviewed ROS   Review of Systems   Constitutional: Negative for appetite change, fatigue and fever. HENT: Positive for tinnitus (patient states he zones out at times making it hard to hear). Negative for hearing loss, trouble swallowing and voice change. Eyes: Positive for visual disturbance (patient states he zones out at times making it hard to see. ). Negative for photophobia and pain. Respiratory: Negative. Negative for shortness of breath. Cardiovascular: Negative. Negative for palpitations. Gastrointestinal: Negative. Negative for nausea and vomiting. Endocrine: Negative. Negative for cold intolerance. Genitourinary: Negative. Negative for difficulty urinating, dysuria, frequency and urgency. Musculoskeletal: Positive for gait problem (patient is in wheelchair.  However, uses walker also in facilty. ). Negative for back pain, myalgias and neck pain. Skin: Negative. Negative for rash. Allergic/Immunologic: Negative. Neurological: Positive for speech difficulty (patient states he zones out at times making it hard to speak. ) and headaches. Negative for dizziness, tremors, seizures, syncope, facial asymmetry, weakness, light-headedness and numbness. Patient states having lots of HA at night. Starting at the top of the head and radiating down the back of the head. Hematological: Negative. Does not bruise/bleed easily. Psychiatric/Behavioral: Positive for agitation (patient states getting very agitated. ). Negative for confusion, hallucinations and sleep disturbance. Patients wife states he can zone out a lot, at times it may last for longer than a min. Patient can be disoriented at times.

## 2023-08-14 NOTE — PROGRESS NOTES
[facility] Note - [place] - [place]  Staff: MD Precious  Group:Precious/Keny/Gustavo/Nia/Azra  Referring provider and please cc: Rosenda Lang md  PCP: Abdirahman Calle MD  Date of Service: 2023        Subjective:      Initial consult by me in clinic  on 8/15/23:    Kidney Cyst(s)  Gala Gaines is a 78 y.o. female was found to have a left complex renal cyst on rbus done on  23. The cyst(s) were on any previous imaging.    Us abd (19):   Right kidney - normal  Left kidney- [id number] complex renal cyst in left mid pole (thin septation)      Rbus  23:  14 mm thinly septated left mid renal cortical cyst, minimally larger compared to 2019              Family history: no family hx of kd cancer, son  leukemia, 1 brother and 2 first cousins for pancreatic cancer    Pertinent medical history:   The patient does not have a history of renal insufficiency.    Lab Results   Component Value Date    CREATININE 0.7 2023       Urine history:     Component Urine Culture, Routine   Latest Ref Rng & Units    2022 ESCHERICHIA COLI (A) . . .   2022 ESCHERICHIA COLI (A) . . .   2010 NO SIGNIFICANT GROWTH       Current REVIEW OF SYSTEMS:  Negative for the remaining 12 points of ROS except for as stated above       PMHx:  Past Medical History:   Diagnosis Date    Coronary artery disease     Migraine headache     Thyroid disease        PSHx:  Past Surgical History:   Procedure Laterality Date    APPENDECTOMY       SECTION      CHOLECYSTECTOMY      HYSTERECTOMY      SHOULDER ARTHROSCOPY      right    TONSILLECTOMY         Fam Hx:  Reviewed- pertinent family hx as above    Soc Hx:  Social History     Tobacco Use    Smoking status: Never    Smokeless tobacco: Never   Substance Use Topics    Alcohol use: Yes     Comment: occasional    Drug use: No       Allergies:  Levofloxacin and Nuts  [tree nut]    Anticoagulation/Aspirin: asa  PHYSICAL MEDICINE AND REHABILITATION   PREADMISSION ASSESSMENT     Projected [facility] Diagnoses:  Impairment of mobility, safety, Activities of Daily Living (ADLs), and cognitive/communication skills due to Stroke:  01 4  No paresis    Etiologic Dx: Multifocal Infarcts  Date of Onset: 4/26/2023   Date of surgery: N/A    PATIENT INFORMATION  Name: Belen Narayanan Phone #: [phone] (home) [phone] (work)  Address: [address] [place], [place] [id number]  YOB: 1960 Age: 58 y o  SS#   Marital Status: /Civil Union  Ethnicity:   Employment Status: retired  Extended Emergency Contact Information  Primary Emergency Contact: [address] Phone: [phone]  Mobile Phone: [phone]  Relation: Spouse  Advance Directive: Level 1 Full Code - unknown advanced directive    INSURANCE/COVERAGE:     Primary Payor: [employer]  REP / Plan: [employer] MEDICARE ADVANTAGE [address] [facility] REP / Product Type: Medicare PPO /   Secondary PayerLucasis Jeffreyantonieta  ID # - [id number]   Payer Contact:  Payer Contact: unknown   Contact Phone:  Contact Phone: [phone]     Authorization #:   Coverage Dates:  LCD:   Medicare #: Maximus Turner Record #: [id number]    REFERRAL SOURCE:   Referring provider: Fransisco Awan, *  Referring facility: [address] [place] [address]  Room: [place] [address]/[place] [address]  PCP: Eyal Patel PCP phone number: [phone]    MEDICAL INFORMATION  HPI: ***    Patient is overall hemodynamically stable and medically cleared for discharge to [facility]  PT/OT/ST therapies were consulted, as well as patient's case reviewed with [facility] physician, and they are recommending patient for inpatient Acute Rehab  He has demonstrated that he can tolerate and participate in 3 hours of therapy per day      COVID Vaccination Status:  Jake Chatman - 3/26/21, 4/19/21  COVID Testing Completed:  4/27/23 - NEGATIVE    Past Medical History:   Past Surgical 81mg    Objective:     Vitals:    08/15/23 1016   BP: (!) 147/80   Pulse: 85       LABS REVIEW:  Recent labs:   Recent Labs   Lab 11/09/22  0820 07/21/23  1009   WBC 8.42 6.29   Hemoglobin 11.6 L 11.7 L   Hematocrit 37.4 37.7   Platelets 298 311   ]  Recent Labs   Lab 11/09/22  0820 07/21/23  1009   Sodium 141 140   Potassium 3.8 4.2   Chloride 110 109   CO2 23 23   BUN 20 14   Creatinine 0.7 0.7   Glucose 86 91   Calcium 9.2 9.5   Alkaline Phosphatase 70 77   Total Protein 6.9 7.1   Albumin 4.1 4.0   Total Bilirubin 0.3 0.2   AST 16 14   ALT 13 9 L   ]    Pertinent urologic PATHOLOGY AND RADIOLOGY REVIEW:  See hpi for recent pertinent imagin      Assessment:     Gala Gaines is a 78 y.o. female with     1. Complex renal cyst    2. Renal cyst        Bosniak 2. Essentially unchanged, only 2mm larger in 4 years. Almost 100% benign. Can do an ultrasound in 1-2 yrs.     Plan:     F/u in 2 years with rbus prior    Strong family hx of multiple cancers. May be a candidate for myrisk. Can defer to her pcp or her oncologist whom she sees bc of her high risk to order. I do not think she is increased risk of this being kd cancer but unchanged essentially in 4 year.     The patient has a Category 2 cyst. You do not really follow up of this cyst.  We will repeat imaging in 24 months.  See below for classifications.     Bosniak Renal Cyst Classification    Category 1 - simple cyst, anechoic, imperceptable wall, rounded.   Work up - none  % malignant - essentially zero    Category 2 - minimally complex, single thin (< 1 mm) septations, thin calcifications, non-enhancing high-attenuation renal lesions < 3 cm are also included in his category; these lesions are generally well-marginated. Benign cystic lesions in which there may be a few thin septa and the wall or septa may contain fine calcifications or a short segment of slightly thickened calcification. This category also includes uniformly high-attenuation lesions that are <3  History: Allergies:     Past Medical History:   Diagnosis Date   • Depression    • Diabetes mellitus ([facility] [address] )    • Dyslipidemia 03/26/2019   • Hyperlipidemia    • Hypertension    • Stroke [facility])     Past Surgical History:   Procedure Laterality Date   • BACK SURGERY     • IR STROKE ALERT  3/19/2019   • SHOULDER SURGERY       Allergies   Allergen Reactions   • Flexeril [Cyclobenzaprine] Drowsiness   • Lisinopril Cough   • Nsaids Confusion         Medical/functional conditions requiring inpatient rehabilitation: multifocal infarcts, recent left MCA CVA (4/16/2023), left facial droop, right sided weakness, leukocytosis, hyponatremia, aphasic at times, impaired mobility and self care, impaired cognition    Risk for medical/clinical complications: risk for falls, risk for aspiration, risk for skin breakdown, risk for infection, risk for DVT/PE, risk for hypo/hypertensive episodes, risk for hypo/hyperglycemia episodes    Comorbidities/Surgeries in the last 100 days: depression, DM, HLD, HTN, left MCA CVA (4/16/2023)    CURRENT VITAL SIGNS:   Temp:  [98 2 °F (36 8 °C)-98 8 °F (37 1 °C)] 98 2 °F (36 8 °C)  HR:  [67-78] 76  Resp:  [15-18] 15  BP: (121-138)/(51-64) 135/62 No intake or output data in the 24 hours ending 04/29/23 1420     LABORATORY RESULTS:      Lab Results   Component Value Date    HGB 15 9 04/28/2023    HCT 46 2 04/28/2023    WBC 11 59 (H) 04/28/2023     Lab Results   Component Value Date    BUN 29 (H) 04/29/2023    K 3 4 (L) 04/29/2023     04/29/2023    GLUCOSE 117 04/16/2019    CREATININE 1 10 04/29/2023     Lab Results   Component Value Date    PROTIME 14 9 (H) 04/26/2023    INR 1 15 04/26/2023        DIAGNOSTIC STUDIES:  XR chest pa & lateral    Result Date: 4/28/2023  Impression: No acute cardiopulmonary disease  Workstation performed: [id number]     X-ray lumbar spine complete 4+ views    Result Date: 4/26/2023  Impression: No lumbar vertebral body fracture or subluxation   Multilevel degenerative changes  Workstation performed: [id number]     MRI brain wo contrast    Result Date: 4/27/2023  Impression: Compared to the prior recent MRI examination dated 4/17/2023, interval increase in size of diffusion abnormality in the left parietal subcortical and periventricular white matter consistent with acute to subacute ischemia  New small focus of gyral diffusion abnormality in the right frontal parietal region (4:20)  Persistent additional small foci of diffusion abnormality in the left periatrial and parieto-occipital region  No acute hemorrhage or midline shift  Stable moderate chronic microangiopathic changes within the brain  Study was marked in [facility] for immediate notification  Workstation performed: [id number]     CT stroke alert brain    Result Date: 4/26/2023  Impression: No acute intracranial abnormality  Chronic microangiopathy  Small evolving subacute infarct in the left parietal periventricular white matter  Other small recent infarcts better visualized on previous MRI  Findings were directly discussed with Liz Shin at 3:42 p m  Workstation performed: [id number]     CTA stroke alert (head/neck)    Result Date: 4/26/2023  Impression: Stable moderate (60 to 65%) stenosis in the bilateral internal carotid arteries  Stable severe stenosis distal right M1 segment  Stable severe stenosis proximal left M2  No new intracranial stenosis or large vessel occlusion  Findings were directly discussed with Liz Shin at 3:42 p m   Workstation performed: [id number]       PRECAUTIONS/SPECIAL NEEDS:  Tobacco:   Social History     Tobacco Use   Smoking Status Former   Smokeless Tobacco Never   , Alcohol:    Social History     Substance and Sexual Activity   Alcohol Use Not Currently   , Anticoagulation:  Eliquis & Brilinta, Blood Sugar Management: as per MD recommendations, Edema Management, Safety Concerns, Pain Management, Aspiration Risk/Precautions, Dietary Restrictions: Diabetic 4gm Na diet, Language Preference: cm in diameter, well marginated, and nonenhancing  Work up - none  % malignant - essentially zero    Category 2F - minimally complex but requiring follow up.  Increased number of septa, minimally thickened or enhancing septa or wall; thick calcifications, hyperdense cyst that is > 3 cm, mostly intrarenal (< 25% of wall visible); no enhancement. Cysts in this category are generally well marginated and are more complicated than category II cysts but less complicated than category III cysts. They may have multiple thin septa or minimal smooth thickening of the septa or wall, which may contain calcification that may also be thick and nodular. They have no measurable contrast enhancement. However, these lesions may have subjectively perceived enhancement of the septa or wall when the unenhanced and contrast-enhanced images are compared. This category also includes totally intrarenal, nonenhancing, high-attenuating lesions that are >3 cm in diameter. These cysts require follow-up to ascertain that they are not malignant.  Work up - need US or CT follow up  % malignant - 5 %    Category 3 - indeterminate, thick or multiple septations, mural nodule, hyperdense on CT (see 2 F). Indeterminate cystic masses that have thickened, irregular or smooth walls or septa. Measurable enhancement is present. Approximately 40 to 60 percent are malignant (cystic renal cell carcinoma and multiloculated cystic renal cell carcinoma) [3-5]. The remaining lesions are benign and include hemorrhagic cysts, chronic infected cysts, and multiloculated cystic nephroma.  Treatment/work up - partial nephrectomy, RFA, or observation in elderly/poor surgical candidate  % malignant about 50%    Category 4 - solid mass with cystic spaces, category IV lesions have all the characteristics of category III cysts, plus they contain enhancing soft-tissue components that are adjacent to and independent of the wall or septum (image 2). Approximately 85 to 100  English and Fall Precautions      MEDICATIONS:     Current Facility-Administered Medications:   •  acetaminophen (TYLENOL) tablet 650 mg, 650 mg, Oral, Q6H PRN, Trudell Mcardle, MD, 650 mg at 04/26/23 1837  •  amLODIPine (NORVASC) tablet 10 mg, 10 mg, Oral, Daily, Trudell Mcardle, MD, 10 mg at 04/29/23 0805  •  apixaban (ELIQUIS) tablet 5 mg, 5 mg, Oral, BID, Trudell Mcardle, MD, 5 mg at 04/29/23 0805  •  atorvastatin (LIPITOR) tablet 40 mg, 40 mg, Oral, Daily, Trudell Mcardle, MD, 40 mg at 04/29/23 0805  •  calcium carbonate (TUMS) chewable tablet 1,000 mg, 1,000 mg, Oral, Daily PRN, Trudell Mcardle, MD  •  citalopram (CeleXA) tablet 20 mg, 20 mg, Oral, Daily, Trudell Mcardle, MD, 20 mg at 04/29/23 0805  •  docusate sodium (COLACE) capsule 100 mg, 100 mg, Oral, BID, Trudell Mcardle, MD, 100 mg at 04/29/23 0805  •  donepezil (ARICEPT) tablet 5 mg, 5 mg, Oral, BID, Trudell Mcardle, MD, 5 mg at 04/29/23 1810  •  hydrALAZINE (APRESOLINE) tablet 25 mg, 25 mg, Oral, TID, Trudell Mcardle, MD, 25 mg at 04/29/23 0805  •  hydrochlorothiazide (HYDRODIURIL) tablet 25 mg, 25 mg, Oral, Daily, ERIC Og, 25 mg at 04/29/23 0805  •  insulin lispro (HumaLOG) 100 units/mL subcutaneous injection 1-6 Units, 1-6 Units, Subcutaneous, TID AC **AND** Fingerstick Glucose (POCT), , , TID AC, Trudell Mcardle, MD  •  insulin lispro (HumaLOG) 100 units/mL subcutaneous injection 1-6 Units, 1-6 Units, Subcutaneous, HS, Trudell Mcardle, MD  •  losartan (COZAAR) tablet 50 mg, 50 mg, Oral, Daily, Trudell Mcardle, MD, 50 mg at 04/29/23 0805  •  metoprolol succinate (TOPROL-XL) 24 hr tablet 100 mg, 100 mg, Oral, Daily, Trudell Mcardle, MD, 100 mg at 04/29/23 0805  •  nicotine (NICODERM CQ) 14 mg/24hr TD 24 hr patch 14 mg, 14 mg, Transdermal, Daily, ERIC Og  •  ondansetron (ZOFRAN) injection 4 mg, 4 mg, Intravenous, Q6H PRN, Trudell Mcardle, MD  •  pantoprazole (PROTONIX) EC tablet 20 mg, 20 mg, Oral, Early Morning, Trudell Mcardle, MD, 20 mg at 04/29/23 percent of category IV lesions have been reported to be malignant in various studies  treatment - partial or total nephrectomy  % malignant - 90%      We went over Simple Kidney Cysts  Simple kidney cysts are fluid-filled sacs that form in your kidneys. These cysts usually dont affect how the kidneys function. Simple kidney cysts are very common. They rarely need treatment. Most people dont even know that they have them. Simple cysts mean they have a thin wall and contain water-like fluid. Renal cysts become fairly common as people age and usually do not cause symptoms or harm.    What causes simple kidney cysts?  Researchers are still not sure what causes simple kidney cysts. You might have a single kidney cyst. Or you might have more than one. You might have them on only 1 kidney or on both of them. In most cases, a person has only 1 cyst. Over time the cyst may slowly increase in size.  Some health conditions can cause kidney cysts to grow. For example, a person with polycystic kidney disease (usually inherited) develops a large number of kidney cysts. Too many cysts can keep the kidney from working properly.  Other health conditions that can cause simple kidney cysts include:  Chronic kidney disease  Dialysis for chronic kidney disease  Medullary cystic kidney disease  Autosomal dominant polycystic kidney disease  Von Hippel-Lindau disease  Tuberous sclerosis complex  If you smoke or have high blood pressure, you may have a higher risk for a simple kidney cyst.    Symptoms of simple kidney cysts  Simple kidney cysts often dont cause symptoms. In rare cases, they may cause symptoms such as:  Blood in your urine if the cyst bursts  Pain in your upper belly or back if the cyst bursts  Fever and chills if the cyst is infected  High blood pressure if the cyst compresses the rest of the kidney  Trouble urinating if the cyst blocks the tube that sends urine from the kidneys to the bladder (ureter)    A simple kidney  4595  •  senna (SENOKOT) tablet 17 2 mg, 2 tablet, Oral, Daily PRN, Nilton Hunter MD  •  tamsulosin (FLOMAX) capsule 0 4 mg, 0 4 mg, Oral, Daily With Molly Barrow MD, 0 4 mg at 04/28/23 1703  •  ticagrelor (BRILINTA) tablet 90 mg, 90 mg, Oral, Q12H [address], Jan Javon Desai, DO, 90 mg at 04/29/23 7150    SKIN INTEGRITY:   cracking B/L heels    PRIOR LEVEL OF FUNCTION:  He lives in [facility] single family home  Luis Daniel Valiente is  and lives with their spouse  Self Care: Independent, Indoor Mobility: Independent, Stairs (in/outdoor): Independent and Cognition: Independent  Prior to patient's admission, patient was fully Independent with ADLs and IADLs (shared with spouse), including driving  He was Independent without use of AD for mobility  FALLS IN THE LAST 6 MONTHS: 0    HOME ENVIRONMENT:  The living area: can live on one level, bedroom on 2nd floor and bathroom on 2nd floor  There are 2 steps to enter the home  The patient will not have 24 hour supervision/physical assistance available upon discharge  Patient's spouse is supportive and able to assist as needed, however does still work  Patient's sister is able to assist if needed  PREVIOUS DME:  Equipment in home (previous DME): Shower Chair, Grab Bars and Raised Toilet    FUNCTIONAL STATUS:  Physical Therapy Occupational Therapy Speech Therapy   *** *** 4/28/2023, per SLP    Subjective:  Pt awake and alert  OOB in chair  Objective:  Pt seen for ongoing tx of expressive language disorder  Pt seen w/ word finding activities including picture naming and divergent naming  Pt achieved about 30% accuracy independently naming images, increased to 60% w/ max verbal cues  Pt achieved 25% independent accuracy on divergent naming tasks, increased to 50% given max verbal cues  Pt often requires max verbal cues including clues about the word, first sound, etc  Pt was able to name his children and wife, but not the state or town he lived in   Spontaneous cyst usually doesnt greatly impair kidney function unless it blocks the ureter.   Diagnosing simple kidney cysts  Simple kidney cysts are often first found with an imaging test that was done for another reason (this is called incidental)  It is important to distinguish simple kidney cysts from complex cysts. Complex cysts are a different kind of cyst that can become cancer. A complex cyst needs to be monitored with periodic images (usually every 6 months).  Kidney ultrasound  Kidney CT scan, if a detailed picture of the cyst is needed  Kidney MRI, if more information is needed about the cyst    Can a cyst on your kidney be painful?  Usually cysts DO NOT cause pain. However, in rare cases simple kidney cysts can grow large enough and cause a dull pain in a person's back, side or upper abdomen. Rarely, the cysts can also get infected, causing pain, fever and tenderness. Rarely they can burst, also causing pain.   Again, simple kidney cysts rarely harm kidneys or impair their ability to function.    What is a parapelvic cyst?  It's a cyst(s) adjacent to the pelvis of the kidney. The pelvis is where the urine collects before it drains down the ureter into the bladder. It's difficult to tell whether or not you have a parapelvic cysts verses backup of urine (from a kidney stone, tumor, scar tissue). You need a CT scan with IV dye that outlines the urine collecting system. The cysts will show up black and the urine will show up white. Parapelvic cysts, once diagnosed, are benign and usually need no further follow-up. However, rarely they can grow and prevent the urine from draining. More often, they are confused with backup of urine, otherwise known as hydronephrosis. Hydronephrosis is more serious and needs further evaluation to see why the urine is not draining.           Rosenda Lang MD    speech was more fluent (happy birthday)  Conversationally, pt shows improved expression       Assessment:  Pt primary difficulty remains confrontation naming/word finding  Encouraged pt to read newspaper, etc aloud to encourage fluent speech  Also to name items around the room       Plan/Recommendations:  Continue to target word finding impairments  Plan to target written expression  ST to f/u           CARE SCORES:  Self Care:  Eating: {[facility] Pre Admission Screenin}  Oral hygiene: {[facility] Pre Admission Screenin}  Toilet hygiene: {ARC Pre Admission Screenin}  Shower/bathing self: {ARC Pre Admission Screenin}  Upper body dressing: {ARC Pre Admission Screenin}  Lower body dressing: {ARC Pre Admission Screenin}  Putting on/taking off footwear: {ARC Pre Admission Screenin}  Transfers:  Roll left and right: {ARC Pre Admission Screenin}  Sit to lying: {ARC Pre Admission Screenin}  Lying to sitting on side of bed: {ARC Pre Admission Screenin}  Sit to stand: {ARC Pre Admission Screenin}  Chair/bed to chair transfer: {ARC Pre Admission Screenin}  Toilet transfer: {ARC Pre Admission Screenin}  Mobility:  Walk 10 ft: {ARC Pre Admission Screenin}  Walk 50 ft with two turns: {ARC Pre Admission Screenin}  Walk 150ft: {ARC Pre Admission Screenin}    CURRENT GAP IN FUNCTION  Prior to Admission: Functional Status: Patient was independent with mobility/ambulation, transfers, ADL's, IADL's  Expected functional outcomes: It is expected that with skilled acute rehabilitation services the patient will progress to Independent for self care and Independent for mobility     Estimated length of stay: 10 to 14 days    Anticipated Post-Discharge Disposition/Treatment  Disposition: Return to previous home/apartment    Outpatient Services: Physical Therapy (PT), Occupational Therapy (OT) and Speech Therapy    BARRIERS TO DISCHARGE  Weakness, Pain, Balance Difficulty, Fatigue, Home Accessibility, Caregiver Accessibility, Financial Resources, Equipment Needs and Resource Availability    INTERVENTIONS FOR DISCHARGE  Adaptive equipment, Patient/Family/Caregiver Education, [employer], Support Group, Financial Assistance, Arrange DME needs, Medication Changes as per MD recommendations, Therapy exercises, Center of balance support  and Energy conservation education     REQUIRED THERAPY:  Patient will require PT, OT and ST 60 minutes each per day, five days per week to achieve rehab goals  REQUIRED FUNCTIONAL AND MEDICAL MANAGEMENT FOR INPATIENT REHABILITATION:  Skin:  There are no pressure sores currently, B/L heels cracking, Pain Management: Overall pain is well controlled, Deep Vein Thrombosis (DVT) Prophylaxis:  Brilinta & Eliquis, Diabetes Management: continue sliding scale insulin, patient to do finger sticks as ordered, SLIM to continue to manage diabetes, and further IM management of additional medical conditions while on ARC, he needs PT/OT/ST intervention, patient/family education and training, possible Neuropsych and Neurology consults with any other needed consults prn, nursing medication review and management of bowel/bladder function  Patient/family can participate in unit based stroke education while on ARC  RECOMMENDED LEVEL OF CARE: ***    Prior to patient's admission, patient was fully Independent with ADLs and IADLs (shared with spouse), including driving  He was Independent without use of AD for mobility  Currently, patient is ***  Close medical management and PM&R management is recommended at this time while patient is on the [facility]  Inpatient acute rehab is recommended for patient to maximize overall strength and mobility upon discharge to home with support of family

## 2023-08-15 ENCOUNTER — TELEPHONE (OUTPATIENT)
Dept: NEUROLOGY | Facility: CLINIC | Age: 63
End: 2023-08-15

## 2023-08-16 NOTE — TELEPHONE ENCOUNTER
Called the patient's wife, Erickson Meek. Reviewed Dr. Rajat Wahl last office visit note from 8/2/23. Per Dr. Rajat Wahl note from 8/2/23:  "-for stroke prevention continue with combination of aspirin, brilinta and atorvastatin"    Coreen Antunez of the above. She expressed understanding and was appreciative. Denies any further questions or concerns at this time.

## 2023-08-18 ENCOUNTER — HOSPITAL ENCOUNTER (OUTPATIENT)
Dept: NON INVASIVE DIAGNOSTICS | Age: 63
Discharge: HOME | End: 2023-08-18
Payer: COMMERCIAL

## 2023-08-18 ENCOUNTER — CONSULT (OUTPATIENT)
Dept: VASCULAR SURGERY | Facility: CLINIC | Age: 63
End: 2023-08-18
Payer: COMMERCIAL

## 2023-08-18 VITALS — SYSTOLIC BLOOD PRESSURE: 150 MMHG | HEART RATE: 78 BPM | OXYGEN SATURATION: 96 % | DIASTOLIC BLOOD PRESSURE: 70 MMHG

## 2023-08-18 DIAGNOSIS — I65.23 CAROTID STENOSIS, BILATERAL: Primary | ICD-10-CM

## 2023-08-18 DIAGNOSIS — I63.9 CEREBROVASCULAR ACCIDENT (CVA), UNSPECIFIED MECHANISM (HCC): ICD-10-CM

## 2023-08-18 DIAGNOSIS — I71.43 INFRARENAL ABDOMINAL AORTIC ANEURYSM (AAA) WITHOUT RUPTURE (HCC): ICD-10-CM

## 2023-08-18 DIAGNOSIS — I73.9 CLAUDICATION OF BOTH LOWER EXTREMITIES (HCC): ICD-10-CM

## 2023-08-18 LAB
AORTIC ROOT: 3.5 CM
AORTIC VALVE MEAN VELOCITY: 9 M/S
APICAL FOUR CHAMBER EJECTION FRACTION: 50 %
AV LVOT MEAN GRADIENT: 1 MMHG
AV LVOT PEAK GRADIENT: 5 MMHG
AV MEAN GRADIENT: 10 MMHG
AV PEAK GRADIENT: 32 MMHG
AV REGURGITATION PRESSURE HALF TIME: 731 MS
AV VALVE AREA: 1.79 CM2
AV VELOCITY RATIO: 0.33
DOP CALC AO PEAK VEL: 3.3 M/S
DOP CALC AO VTI: 65 CM
DOP CALC LVOT AREA: 4.52 CM2
DOP CALC LVOT CARDIAC INDEX: 4.25 L/MIN/M2
DOP CALC LVOT CARDIAC OUTPUT: 8.58 L/MIN
DOP CALC LVOT DIAMETER: 2.4 CM
DOP CALC LVOT PEAK VEL VTI: 25.72 CM
DOP CALC LVOT PEAK VEL: 1.09 M/S
DOP CALC LVOT STROKE INDEX: 56.4 ML/M2
DOP CALC LVOT STROKE VOLUME: 116.3 CM3
E WAVE DECELERATION TIME: 172 MS
FRACTIONAL SHORTENING: 36 % (ref 28–44)
INTERVENTRICULAR SEPTUM IN DIASTOLE (PARASTERNAL SHORT AXIS VIEW): 1.1 CM
INTERVENTRICULAR SEPTUM: 1.1 CM (ref 0.6–1.1)
LAAS-AP2: 25.1 CM2
LAAS-AP4: 17.5 CM2
LEFT ATRIUM AREA SYSTOLE SINGLE PLANE A4C: 17.2 CM2
LEFT ATRIUM SIZE: 4.3 CM
LEFT ATRIUM VOLUME (MOD BIPLANE): 60 ML
LEFT INTERNAL DIMENSION IN SYSTOLE: 2.7 CM (ref 2.1–4)
LEFT VENTRICULAR INTERNAL DIMENSION IN DIASTOLE: 4.2 CM (ref 3.5–6)
LEFT VENTRICULAR POSTERIOR WALL IN END DIASTOLE: 1.1 CM
LEFT VENTRICULAR STROKE VOLUME: 54 ML
LVSV (TEICH): 54 ML
MV E'TISSUE VEL-SEP: 6 CM/S
MV PEAK A VEL: 1.12 M/S
MV PEAK E VEL: 87 CM/S
MV STENOSIS PRESSURE HALF TIME: 50 MS
MV VALVE AREA P 1/2 METHOD: 4.4 CM2
RA PRESSURE ESTIMATED: 8 MMHG
RIGHT ATRIUM AREA SYSTOLE A4C: 20.1 CM2
RIGHT VENTRICLE ID DIMENSION: 3.7 CM
SL CV AV DECELERATION TIME RETROGRADE: 2522 MS
SL CV AV PEAK GRADIENT RETROGRADE: 85 MMHG
SL CV LEFT ATRIUM LENGTH A2C: 6.6 CM
SL CV LV EF: 60
SL CV PED ECHO LEFT VENTRICLE DIASTOLIC VOLUME (MOD BIPLANE) 2D: 80 ML
SL CV PED ECHO LEFT VENTRICLE SYSTOLIC VOLUME (MOD BIPLANE) 2D: 27 ML
TRICUSPID ANNULAR PLANE SYSTOLIC EXCURSION: 3.1 CM

## 2023-08-18 PROCEDURE — 99245 OFF/OP CONSLTJ NEW/EST HI 55: CPT | Performed by: SURGERY

## 2023-08-18 PROCEDURE — 93306 TTE W/DOPPLER COMPLETE: CPT

## 2023-08-18 PROCEDURE — 93306 TTE W/DOPPLER COMPLETE: CPT | Performed by: INTERNAL MEDICINE

## 2023-08-18 RX ORDER — TRAZODONE HYDROCHLORIDE 100 MG/1
TABLET ORAL
COMMUNITY
Start: 2023-06-28

## 2023-08-18 RX ADMIN — PERFLUTREN 1.2 ML/MIN: 6.52 INJECTION, SUSPENSION INTRAVENOUS at 11:14

## 2023-08-18 NOTE — ASSESSMENT & PLAN NOTE
History of b/l carotid artery stenosis with bilateral MCA strokes, right MCA thrombectomy 2019, left MCA thrombectomy and left ICA stenting 5/2023 with Dr. Corwin Davis. He was admitted 7/1-7/12 due to generalized weakness and confusion, visual disturbances and impulsive behavior. He did not have any findings of new stroke. He denied new lateralizing symptoms, just generalized weakness which is improving. He has baseline weakness left > right, facial asymmetry, tongue midline. He reports some speech difficulty at times and "zones out." Unclear etiology of spells. CTA h/n 5/2023 reviewed - left carotid stent is patient, right ICA 60% stenosis, right M1 high grade stenosis  MRI brain 7/2/23 - left evolving parieto-occipital infarct compared to imaging in May without new infarct    -We discussed the pathophysiology of carotid disease, available treatment options and indications for treatment.  -No new symptoms consistent with recurrent TIA/CVA. Saw neurology yesterday with similar findings.   -Recommend baseline carotid duplex. Has follow-up with neurosurgery who performed previous carotid/MCA thrombectomy interventions.  -Continue optimization of medical management. Currently on ASA, brilinta and statin. Strokes felt less likely to be cardioembolic in nature given negative loop recorder/no findings of arrhythmia.  No longer on anticoagulation.  -Advised to return sooner or go to the ED if he develops any TIA/CVA symptoms.  -Follow-up in 6 months

## 2023-08-18 NOTE — PROGRESS NOTES
Assessment/Plan:    Carotid stenosis, bilateral  History of b/l carotid artery stenosis with bilateral MCA strokes, right MCA thrombectomy 2019, left MCA thrombectomy and left ICA stenting 5/2023 with Dr. Chey Steinberg. He was admitted 7/1-7/12 due to generalized weakness and confusion, visual disturbances and impulsive behavior. He did not have any findings of new stroke. He denied new lateralizing symptoms, just generalized weakness which is improving. He has baseline weakness left > right, facial asymmetry, tongue midline. He reports some speech difficulty at times and "zones out." Unclear etiology of spells. CTA h/n 5/2023 reviewed - left carotid stent is patient, right ICA 60% stenosis, right M1 high grade stenosis  MRI brain 7/2/23 - left evolving parieto-occipital infarct compared to imaging in May without new infarct    -We discussed the pathophysiology of carotid disease, available treatment options and indications for treatment.  -No new symptoms consistent with recurrent TIA/CVA. Saw neurology yesterday with similar findings.   -Recommend baseline carotid duplex. Has follow-up with neurosurgery who performed previous carotid/MCA thrombectomy interventions.  -Continue optimization of medical management. Currently on ASA, brilinta and statin. Strokes felt less likely to be cardioembolic in nature given negative loop recorder/no findings of arrhythmia. No longer on anticoagulation.  -Advised to return sooner or go to the ED if he develops any TIA/CVA symptoms.  -Follow-up in 6 months    Infrarenal abdominal aortic aneurysm (AAA) without rupture ([address])  Incidental finding of small AAA on CT and MRI lumbar spine over the past year. Overall stable size between studies 3.4cm.    -We discussed the pathophysiology of aneurysmal disease, available treatment options and indications for treatment. Discussed criteria for intervention including size>=5.5cm, growth >0.5cm in 6 months, symptomatic aneurysm or rupture. -Discussed risk factors including smoking, male gender, connective tissue disorders, HTN, COPD, family history and possible genetic predisposition. Also discussed association with aneurysms elsewhere, most commonly the popliteal artery. Will obtain bilateral lower extremity arterial duplex due to history of PAD and claudication symptoms.  -Discussed signs and symptoms of symptomatic AAA/aortic rupture  -Recommend continued surveillance with abdominal aorta/iliac duplex in 1 year. No intervention recommended at this time. Claudication of both lower extremities ([address])  History of bilateral calf claudication preceding strokes. His lower extremity cramping and weakness symptoms at this point are multifactorial given multiple strokes and chronic back pain. On pletal.    Reviewed previous studies LEAD 2019 and CTCAP in the past which suggests some mild to moderate aortoiliac occlusive disease and PAD    -We discussed the pathophysiology of arterial occlusive disease, available treatment options and indications for treatment. -Multifactorial lower extremity cramping and claudication symptoms from arterial insufficiency, neurogenic pain/chronic back pain, and stroke. -Continue medical optimization. Currently on ASA, pletal and statin. -Recommend walking program, ambulating at least 30 minutes for 3-5 times weekly  -Recommend moisturization of the lower extremities, avoidance of injury and close observation of bilateral feet for any new wounds  -Obtain baseline LEAD for surveillance with follow-up in 6 months. Diagnoses and all orders for this visit:    Carotid stenosis, bilateral  -     VAS carotid complete study; Future    Infrarenal abdominal aortic aneurysm (AAA) without rupture ([address])  -     Ambulatory Referral to Vascular Surgery  -     VAS abdominal aorta/iliacs; complete study;  Future    Claudication of both lower extremities (HCC)  -     VAS lower limb arterial duplex, complete bilateral; Future    Other orders  -     traZODone (DESYREL) 100 mg tablet        I have spent 60 minutes with Patient  today in which greater than 50% of this time was spent in counseling/coordination of care regarding Intructions for management, Importance of tx compliance and Impressions. Subjective:      Patient ID: Hussein Gilman is a 58 y.o. male. Patient presents for consult for AAA and carotid stenosis. He had a MRI done 5/7/2023 and CTA head and neck done 5/31/2023. He denies abd pain. He states he gets cramps after eating and has loss of appetite. He had a CTA head and neck done 5/31/2023. He denies new TIA/CVA symptoms at this time. He is taking ASA 81 mg, Atorvastatin, Pletal, and Brilinta. HPI  Mr. Nile Velasquez is a 57yo male former smoker with HTN, HLD, bilateral carotid stenosis, bilateral MCA strokes s/p R MCA thrombectomy 2019, left MCA thrombectomy, left ICA stenting 5/2023 Chon Benavidez), possible NPH, memory loss, DMII, small AAA, AOID/PAD with claudication, frequent falls who presents to [facility] care. He was admitted in July to the hospital with generalized weakness, confusion/impulsive behavior for stroke work-up. He has baseline weakness left > right since his strokes but denies new worsening lateralizing symptoms, visual or speech disturbances. He reports a history of bilateral calf claudication prior to his strokes for which he was started on pletal. He denies rest pain or tissue loss. He has no abdominal pain. He has chronic back pain. The following portions of the patient's history were reviewed and updated as appropriate: allergies, current medications, past family history, past medical history, past social history, past surgical history and problem list.    I have reviewed and made appropriate changes to the review of systems input by the medical assistant.     Vitals:    08/18/23 0926   BP: 150/70   BP Location: Left arm   Patient Position: Sitting   Cuff Size: Large   Pulse: 78   SpO2: 96% Patient Active Problem List   Diagnosis   • History of stroke   • Headache   • Primary hypertension   • GERD (gastroesophageal reflux disease)   • At risk for venous thromboembolism (VTE)   • Hypertriglyceridemia   • Nicotine dependence   • Carotid stenosis, bilateral   • Presbyopia   • Urinary retention   • Fall   • 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 ([address])   • Anxiety and depression   • Insomnia   • Type 2 diabetes mellitus (HCC)   • Status post placement of implantable loop recorder   • Chronic ischemic right MCA stroke   • Snoring   • Memory deficits   • Hypertensive encephalopathy, transient   • Chronic low back pain   • Middle cerebral artery stenosis, right   • Left posterior MCA stroke - etiology unclear at this time   • Chronic anticoagulation   • Stroke [facility])   • Hyponatremia   • Prediabetes   • SIRS (systemic inflammatory response syndrome) ([facility])   • Tachycardia   • Infrarenal abdominal aortic aneurysm (AAA) without rupture ([facility])   • History of tobacco use   • Chronic ischemic left MCA stroke   • Hypokalemia   • Abnormal laboratory test   • Multiple thyroid nodules   • Possible NPH (normal pressure hydrocephalus) ([facility])   • Muscle spasms of both lower extremities   • Aphasia   • Atherosclerosis of native arteries of extremities with intermittent claudication, bilateral legs ([facility])   • Benign prostatic hyperplasia with lower urinary tract symptoms   • Type 2 diabetes mellitus with other diabetic ophthalmic complication ([facility])   • Claudication of both lower extremities ([address])       Past Surgical History:   Procedure Laterality Date   • BACK SURGERY     • IR CEREBRAL ANGIOGRAPHY  5/4/2023   • IR STROKE ALERT  3/19/2019   • SHOULDER SURGERY         Family History   Problem Relation Age of Onset   • Heart attack Mother    • Diabetes Mother    • Prostate cancer Father        Social History     Socioeconomic History   • Marital status: /Civil Union     Spouse name: Not on file   • Number of children: Not on file   • Years of education: Not on file   • Highest education level: Not on file   Occupational History   • Not on file   Tobacco Use   • Smoking status: Former   • Smokeless tobacco: Never   Vaping Use   • Vaping Use: Former   Substance and Sexual Activity   • Alcohol use: Never   • Drug use: Never   • Sexual activity: Not on file   Other Topics Concern   • Not on file   Social History Narrative   • Not on file     Social Determinants of Health     Financial Resource Strain: Not on file   Food Insecurity: No Food Insecurity (7/3/2023)    Hunger Vital Sign    • Worried About Running Out of Food in the Last Year: Never true    • Ran Out of Food in the Last Year: Never true   Transportation Needs: No Transportation Needs (7/3/2023)    PRAPARE - Transportation    • Lack of Transportation (Medical): No    • Lack of Transportation (Non-Medical):  No   Physical Activity: Not on file   Stress: Not on file   Social Connections: Not on file   Intimate Partner Violence: Not on file   Housing Stability: Low Risk  (7/3/2023)    Housing Stability Vital Sign    • Unable to Pay for Housing in the Last Year: No    • Number of Places Lived in the Last Year: 1    • Unstable Housing in the Last Year: No       Allergies   Allergen Reactions   • Flexeril [Cyclobenzaprine] Drowsiness   • Lisinopril Cough   • Nsaids Confusion         Current Outpatient Medications:   •  acetaminophen (TYLENOL) 325 mg tablet, Take 3 tablets (975 mg total) by mouth 3 (three) times a day as needed for mild pain, headaches or fever, Disp: , Rfl: 0  •  aspirin (ECOTRIN LOW STRENGTH) 81 mg EC tablet, Take 1 tablet (81 mg total) by mouth daily Do not start before April 19, 2023., Disp: 30 tablet, Rfl: 0  •  atorvastatin (LIPITOR) 40 mg tablet, Take 40 mg by mouth daily with breakfast, Disp: , Rfl:   •  baclofen 5 MG TABS, Take 5 mg by mouth 2 (two) times a day as needed for muscle spasms, Disp: , Rfl: 0  •  cilostazol (PLETAL) 100 mg tablet, Take 1 tablet (100 mg total) by mouth 2 (two) times a day before meals Do not start before June 6, 2023., Disp: , Rfl: 0  •  citalopram (CeleXA) 20 mg tablet, Take 20 mg by mouth daily, Disp: , Rfl:   •  donepezil (ARICEPT) 10 mg tablet, Take 1 tablet (10 mg total) by mouth in the morning, Disp: 60 tablet, Rfl: 2  •  lidocaine (Lidoderm) 5 %, Apply 1 patch topically over 12 hours daily Remove & Discard patch within 12 hours or as directed by MD, Disp: 30 patch, Rfl: 2  •  losartan (COZAAR) 50 mg tablet, Take 50 mg by mouth daily, Disp: , Rfl:   •  metFORMIN (GLUCOPHAGE) 500 mg tablet, , Disp: , Rfl:   •  metoprolol succinate (TOPROL-XL) 100 mg 24 hr tablet, Take 100 mg by mouth daily, Disp: , Rfl:   •  pantoprazole (PROTONIX) 40 mg tablet, Take 1 tablet (40 mg total) by mouth daily, Disp: 90 tablet, Rfl: 2  •  tamsulosin (FLOMAX) 0.4 mg, Take 1 capsule (0.4 mg total) by mouth daily with dinner, Disp: , Rfl: 0  •  ticagrelor (BRILINTA) 90 MG, Take 1 tablet (90 mg total) by mouth every 12 (twelve) hours, Disp: , Rfl: 0  •  traZODone (DESYREL) 100 mg tablet, , Disp: , Rfl:   •  potassium chloride (K-DUR,KLOR-CON) 20 mEq tablet, Take 2 tablets (40 mEq total) by mouth daily for 15 days Do not start before July 13, 2023., Disp: 30 tablet, Rfl: 0  •  traZODone (DESYREL) 50 mg tablet, Take 1 tablet (50 mg total) by mouth daily at bedtime Do not start before June 6, 2023., Disp: , Rfl: 0  No current facility-administered medications for this visit. Review of Systems   Constitutional: Positive for appetite change. HENT: Negative. Eyes: Positive for visual disturbance. Respiratory: Negative. Cardiovascular: Negative. Gastrointestinal: Negative. Negative for abdominal pain. Endocrine: Negative. Genitourinary: Negative. Musculoskeletal: Positive for gait problem. Skin: Negative. Allergic/Immunologic: Negative. Neurological: Positive for speech difficulty and weakness (L sided weakness).  Negative for dizziness, facial asymmetry, light-headedness and headaches. Hematological: Negative. Psychiatric/Behavioral: Negative. I have personally reviewed the ROS entered by MA and agree as documented. Objective:      /70 (BP Location: Left arm, Patient Position: Sitting, Cuff Size: Large)   Pulse 78   SpO2 96%          Physical Exam  Constitutional:       Appearance: Normal appearance. HENT:      Head: Normocephalic and atraumatic. Cardiovascular:      Rate and Rhythm: Normal rate. Pulses:           Dorsalis pedis pulses are 0 on the right side and 0 on the left side. Posterior tibial pulses are 0 on the right side and 0 on the left side. Pulmonary:      Effort: Pulmonary effort is normal.   Abdominal:      General: There is no distension. Palpations: Abdomen is soft. Tenderness: There is no abdominal tenderness. Musculoskeletal:         General: Normal range of motion. Cervical back: Normal range of motion and neck supple. Skin:     General: Skin is warm and dry. Capillary Refill: Capillary refill takes less than 2 seconds. Neurological:      Mental Status: He is alert and oriented to person, place, and time. Mental status is at baseline. Comments: LUE/LLE weakness   Psychiatric:         Mood and Affect: Mood normal.         Behavior: Behavior normal.         Thought Content:  Thought content normal.         Judgment: Judgment normal.

## 2023-08-18 NOTE — PATIENT INSTRUCTIONS
Carotid stenosis, bilateral  History of b/l carotid artery stenosis with bilateral MCA strokes, right MCA thrombectomy 2019, left MCA thrombectomy and left ICA stenting 5/2023 with Dr. Calvin Marcos. He was admitted 7/1-7/12 due to generalized weakness and confusion, visual disturbances and impulsive behavior. He did not have any findings of new stroke. He denied new lateralizing symptoms, just generalized weakness which is improving. He has baseline weakness left > right, facial asymmetry, tongue midline. He reports some speech difficulty at times and "zones out." Unclear etiology of spells. CTA h/n 5/2023 reviewed - left carotid stent is patient, right ICA 60% stenosis, right M1 high grade stenosis  MRI brain 7/2/23 - left evolving parieto-occipital infarct compared to imaging in May without new infarct     -We discussed the pathophysiology of carotid disease, available treatment options and indications for treatment.  -No new symptoms consistent with recurrent TIA/CVA. Saw neurology yesterday with similar findings.   -Recommend baseline carotid duplex. Has follow-up with neurosurgery who performed previous carotid/MCA thrombectomy interventions.  -Continue optimization of medical management. Currently on ASA, brilinta and statin. Strokes felt less likely to be cardioembolic in nature given negative loop recorder/no findings of arrhythmia. No longer on anticoagulation.  -Advised to return sooner or go to the ED if he develops any TIA/CVA symptoms.  -Follow-up in 6 months     Infrarenal abdominal aortic aneurysm (AAA) without rupture ([address])  Incidental finding of small AAA on CT and MRI lumbar spine over the past year. Overall stable size between studies 3.4cm.     -We discussed the pathophysiology of aneurysmal disease, available treatment options and indications for treatment.  Discussed criteria for intervention including size>=5.5cm, growth >0.5cm in 6 months, symptomatic aneurysm or rupture.   -Discussed risk factors including smoking, male gender, connective tissue disorders, HTN, COPD, family history and possible genetic predisposition. Also discussed association with aneurysms elsewhere, most commonly the popliteal artery. Will obtain bilateral lower extremity arterial duplex due to history of PAD and claudication symptoms.  -Discussed signs and symptoms of symptomatic AAA/aortic rupture  -Recommend continued surveillance with abdominal aorta/iliac duplex in 1 year. No intervention recommended at this time. Claudication of both lower extremities ([address])  History of bilateral calf claudication preceding strokes. His lower extremity cramping and weakness symptoms at this point are multifactorial given multiple strokes and chronic back pain. On pletal.     Reviewed previous studies LEAD 2019 and CTCAP in the past which suggests some mild to moderate aortoiliac occlusive disease and PAD     -We discussed the pathophysiology of arterial occlusive disease, available treatment options and indications for treatment. -Multifactorial lower extremity cramping and claudication symptoms from arterial insufficiency, neurogenic pain/chronic back pain, and stroke. -Continue medical optimization. Currently on ASA, pletal and statin. -Recommend walking program, ambulating at least 30 minutes for 3-5 times weekly  -Recommend moisturization of the lower extremities, avoidance of injury and close observation of bilateral feet for any new wounds  -Obtain baseline LEAD for surveillance with follow-up in 6 months.

## 2023-08-18 NOTE — ASSESSMENT & PLAN NOTE
Incidental finding of small AAA on CT and MRI lumbar spine over the past year. Overall stable size between studies 3.4cm.    -We discussed the pathophysiology of aneurysmal disease, available treatment options and indications for treatment. Discussed criteria for intervention including size>=5.5cm, growth >0.5cm in 6 months, symptomatic aneurysm or rupture.   -Discussed risk factors including smoking, male gender, connective tissue disorders, HTN, COPD, family history and possible genetic predisposition. Also discussed association with aneurysms elsewhere, most commonly the popliteal artery. Will obtain bilateral lower extremity arterial duplex due to history of PAD and claudication symptoms.  -Discussed signs and symptoms of symptomatic AAA/aortic rupture  -Recommend continued surveillance with abdominal aorta/iliac duplex in 1 year. No intervention recommended at this time.

## 2023-08-18 NOTE — LETTER
August 18, 2023     [place], [address] [address]    Patient: Marcela Hernandez   YOB: 1960   Date of Visit: 8/18/2023       Dear Dr. Anderson Fuel: Thank you for referring Endy Tran to me for evaluation. Below are the relevant portions of my assessment and plan of care. Diagnoses and all orders for this visit:    Carotid stenosis, bilateral  History of b/l carotid artery stenosis with bilateral MCA strokes, right MCA thrombectomy 2019, left MCA thrombectomy and left ICA stenting 5/2023 with Dr. Yossi Feng. He was admitted 7/1-7/12 due to generalized weakness and confusion, visual disturbances and impulsive behavior. He did not have any findings of new stroke. He denied new lateralizing symptoms, just generalized weakness which is improving. He has baseline weakness left > right, facial asymmetry, tongue midline. He reports some speech difficulty at times and "zones out." Unclear etiology of spells. CTA h/n 5/2023 reviewed - left carotid stent is patient, right ICA 60% stenosis, right M1 high grade stenosis  MRI brain 7/2/23 - left evolving parieto-occipital infarct compared to imaging in May without new infarct     -We discussed the pathophysiology of carotid disease, available treatment options and indications for treatment.  -No new symptoms consistent with recurrent TIA/CVA. Saw neurology yesterday with similar findings.   -Recommend baseline carotid duplex. Has follow-up with neurosurgery who performed previous carotid/MCA thrombectomy interventions.  -Continue optimization of medical management. Currently on ASA, brilinta and statin. Strokes felt less likely to be cardioembolic in nature given negative loop recorder/no findings of arrhythmia.  No longer on anticoagulation.  -Advised to return sooner or go to the ED if he develops any TIA/CVA symptoms.  -Follow-up in 6 months     Infrarenal abdominal aortic aneurysm (AAA) without rupture ([address])  Incidental finding of small AAA on CT and MRI lumbar spine over the past year. Overall stable size between studies 3.4cm.     -We discussed the pathophysiology of aneurysmal disease, available treatment options and indications for treatment. Discussed criteria for intervention including size>=5.5cm, growth >0.5cm in 6 months, symptomatic aneurysm or rupture.   -Discussed risk factors including smoking, male gender, connective tissue disorders, HTN, COPD, family history and possible genetic predisposition. Also discussed association with aneurysms elsewhere, most commonly the popliteal artery. Will obtain bilateral lower extremity arterial duplex due to history of PAD and claudication symptoms.  -Discussed signs and symptoms of symptomatic AAA/aortic rupture  -Recommend continued surveillance with abdominal aorta/iliac duplex in 1 year. No intervention recommended at this time. Claudication of both lower extremities ([address])  History of bilateral calf claudication preceding strokes. His lower extremity cramping and weakness symptoms at this point are multifactorial given multiple strokes and chronic back pain. On pletal.     Reviewed previous studies LEAD 2019 and CTCAP in the past which suggests some mild to moderate aortoiliac occlusive disease and PAD     -We discussed the pathophysiology of arterial occlusive disease, available treatment options and indications for treatment. -Multifactorial lower extremity cramping and claudication symptoms from arterial insufficiency, neurogenic pain/chronic back pain, and stroke. -Continue medical optimization. Currently on ASA, pletal and statin. -Recommend walking program, ambulating at least 30 minutes for 3-5 times weekly  -Recommend moisturization of the lower extremities, avoidance of injury and close observation of bilateral feet for any new wounds  -Obtain baseline LEAD for surveillance with follow-up in 6 months. If you have questions, please do not hesitate to call me.  I look forward to following Laney Meier along with you.          Sincerely,        Masood Miller MD        CC: ERIC El

## 2023-08-18 NOTE — ASSESSMENT & PLAN NOTE
History of bilateral calf claudication preceding strokes. His lower extremity cramping and weakness symptoms at this point are multifactorial given multiple strokes and chronic back pain. On pletal.    Reviewed previous studies LEAD 2019 and CTCAP in the past which suggests some mild to moderate aortoiliac occlusive disease and PAD    -We discussed the pathophysiology of arterial occlusive disease, available treatment options and indications for treatment. -Multifactorial lower extremity cramping and claudication symptoms from arterial insufficiency, neurogenic pain/chronic back pain, and stroke. -Continue medical optimization. Currently on ASA, pletal and statin. -Recommend walking program, ambulating at least 30 minutes for 3-5 times weekly  -Recommend moisturization of the lower extremities, avoidance of injury and close observation of bilateral feet for any new wounds  -Obtain baseline LEAD for surveillance with follow-up in 6 months.

## 2023-08-18 NOTE — RESULT ENCOUNTER NOTE
Let let him know echo looks good, should be repeated in 2 years to follow mild aortic stenosis (known diagnosis)

## 2023-08-21 DIAGNOSIS — I35.0 MILD AORTIC STENOSIS: Primary | ICD-10-CM

## 2023-08-28 ENCOUNTER — CONSULT (OUTPATIENT)
Dept: ENDOCRINOLOGY | Facility: HOSPITAL | Age: 63
End: 2023-08-28
Payer: COMMERCIAL

## 2023-08-28 VITALS — DIASTOLIC BLOOD PRESSURE: 70 MMHG | HEART RATE: 48 BPM | SYSTOLIC BLOOD PRESSURE: 122 MMHG

## 2023-08-28 DIAGNOSIS — E04.2 MULTIPLE THYROID NODULES: ICD-10-CM

## 2023-08-28 PROCEDURE — 99244 OFF/OP CNSLTJ NEW/EST MOD 40: CPT | Performed by: INTERNAL MEDICINE

## 2023-08-28 NOTE — PATIENT INSTRUCTIONS
You have an enlarging thyroid nodule on the right in the thyroid. Given it is increasing in size. we should get it biopsied under ultrasound guidance. Ask the neurologist what to do with the blood thinners for the biopsy. Then we can determine follow up based on the biopsy.

## 2023-08-28 NOTE — PROGRESS NOTES
8/28/2023    Assessment/Plan      Diagnoses and all orders for this visit:    Multiple thyroid nodules  -     Ambulatory referral to Endocrinology  -     US guided thyroid biopsy with molecular testing; Future        Assessment/Plan:  Multiple thyroid nodules. He had a recent ultrasound demonstrating several right-sided thyroid nodules. One of his thyroid nodules is a T RADS 4 nodule that is 1.6 cm on the right. At this point, based on its characteristics, biopsy is recommended. I have asked him to get a fine-needle aspiration biopsy of that right upper thyroid nodule under ultrasound guidance. I have asked him to call his neurologist to find out what to do with blood thinners for his biopsy. He will get a fine-needle aspiration biopsy under ultrasound guidance of the right upper thyroid nodule that is T RADS 4 and 1.6 cm. An extra sample will be taken for molecular testing/Afirma testing and held and sent if the pathology reads [place] class III or IV, follicular lesion of unclear significance, or cellular atypia. He and his wife are aware that it could take at least 1 week and up to 3 weeks or more to get the final results depending on whether molecular testing needs to be done. Follow-up will be determined based on the blood work. I have spent a total time of 45 minutes on 08/28/23 in caring for this patient including Diagnostic results, Prognosis, Risks and benefits of tx options, Instructions for management, Patient and family education, Importance of tx compliance, Risk factor reductions, Impressions, Counseling / Coordination of care, Documenting in the medical record, Reviewing / ordering tests, medicine, procedures   and Obtaining or reviewing history  . CC: Thyroid consult    History of Present Illness     HPI: Julius Howard is a 58y.o. year old male with history of recently discovered thyroid nodules for evaluation/consult.     He was admitted to the hospital in late May 2023 with CVA and found to have a thyroid nodule on CT scan of the head and neck at the end of May 2023. The nodule supposedly was increased in size from 2019. He then had a thyroid ultrasound demonstrating 2 thyroid nodules 1 of which was T RADS 4 at 1.6 cm and biopsy was recommended. He of note has no family history of thyroid disease and no history of head or neck radiation in the past.    He is always somewhat cold. He denies palpitation or heat intolerance. Weight is stable as he is gaining weight back after his recent stay in rehab. He does have some fatigue and naps during the day. He has some tremors. He has depression which is worse in general.  He denies anxiety or insomnia. He denies diarrhea or constipation. He has some dry skin on his feet but no brittle nails or hair loss. He denies diplopia. He denies compressive thyroid symptoms or difficulties with swallowing. Review of Systems   Constitutional: Positive for fatigue. Negative for unexpected weight change. Naps during the day. Weight increased to 211 lbs after home from rehab at 198 lbs. HENT: Positive for hearing loss and tinnitus. Negative for trouble swallowing. No XRT to the head or neck in the past.  Decrease in hearing, likely needs hearing aides. Left sided tinnitus. Eyes: Positive for visual disturbance. Right side of each eye is diminished, effects depth perception. It is slowly improved. Respiratory: Negative for chest tightness and shortness of breath. Cardiovascular: Positive for leg swelling. Negative for chest pain and palpitations. Some edema of the ankles. Gastrointestinal: Negative for abdominal pain, constipation, diarrhea and nausea. Endocrine: Positive for cold intolerance. Negative for heat intolerance. Always cold. Musculoskeletal: Positive for back pain. Negative for arthralgias. Low back pain. Getting leg cramps. Skin: Negative for rash.         Some dry skin especially around the feet and hands. No hair loss. No brittle nails. Neurological: Positive for tremors. Negative for dizziness, weakness, light-headedness and headaches. Weakness left side in the past with a  Stoke 4 years ago and now improved. Balance issues due to vision. Will walk around the house. Slight essential tremor. Psychiatric/Behavioral: Positive for confusion and dysphoric mood. Negative for sleep disturbance. The patient is not nervous/anxious. Confusion and cognition since frontal lobe stroke recently.  Depression worse in general.        Historical Information   Past Medical History:   Diagnosis Date   • Depression    • Diabetes mellitus ([address] )     patient denies   • Dyslipidemia 03/26/2019   • GERD (gastroesophageal reflux disease)    • Hyperlipidemia    • Hypertension    • Prediabetes    • Stroke [facility])      Past Surgical History:   Procedure Laterality Date   • ARTHROSCOPIC REPAIR ACL Left    • BACK SURGERY      twice   • CARPAL TUNNEL RELEASE Right    • IR CEREBRAL ANGIOGRAPHY  05/04/2023   • IR STROKE ALERT  03/19/2019   • SHOULDER SURGERY Left      Social History   Social History     Substance and Sexual Activity   Alcohol Use Never     Social History     Substance and Sexual Activity   Drug Use Never     Social History     Tobacco Use   Smoking Status Former   Smokeless Tobacco Never     Family History:   Family History   Problem Relation Age of Onset   • Hyperlipidemia Mother    • Hypertension Mother    • Diabetes unspecified Mother         prediabetes   • Heart attack Mother    • Diabetes Mother    • Hyperlipidemia Father    • Hypertension Father    • Prostate cancer Father    • Stroke Father    • Hyperlipidemia Sister    • Hypertension Sister    • Hyperlipidemia Sister    • Hypertension Sister    • Depression Sister    • Hypertension Sister    • Hyperlipidemia Sister    • Depression Sister    • Hyperlipidemia Brother    • Hypertension Brother    • Hypertension Brother    • Prostate cancer Brother    • Hypothyroidism Daughter    • Hypothyroidism Son    • Diabetes unspecified Son        Meds/Allergies   Current Outpatient Medications   Medication Sig Dispense Refill   • acetaminophen (TYLENOL) 325 mg tablet Take 3 tablets (975 mg total) by mouth 3 (three) times a day as needed for mild pain, headaches or fever  0   • aspirin (ECOTRIN LOW STRENGTH) 81 mg EC tablet Take 1 tablet (81 mg total) by mouth daily Do not start before April 19, 2023. 30 tablet 0   • atorvastatin (LIPITOR) 40 mg tablet Take 40 mg by mouth daily with breakfast     • baclofen 5 MG TABS Take 5 mg by mouth 2 (two) times a day as needed for muscle spasms  0   • cilostazol (PLETAL) 100 mg tablet Take 1 tablet (100 mg total) by mouth 2 (two) times a day before meals Do not start before June 6, 2023.  0   • citalopram (CeleXA) 20 mg tablet Take 20 mg by mouth daily     • donepezil (ARICEPT) 10 mg tablet Take 1 tablet (10 mg total) by mouth in the morning 60 tablet 2   • lidocaine (Lidoderm) 5 % Apply 1 patch topically over 12 hours daily Remove & Discard patch within 12 hours or as directed by MD 30 patch 2   • losartan (COZAAR) 50 mg tablet Take 50 mg by mouth daily     • metFORMIN (GLUCOPHAGE) 500 mg tablet Take 500 mg by mouth daily with breakfast     • metoprolol succinate (TOPROL-XL) 100 mg 24 hr tablet Take 100 mg by mouth daily     • pantoprazole (PROTONIX) 40 mg tablet Take 1 tablet (40 mg total) by mouth daily 90 tablet 2   • tamsulosin (FLOMAX) 0.4 mg Take 1 capsule (0.4 mg total) by mouth daily with dinner  0   • ticagrelor (BRILINTA) 90 MG Take 1 tablet (90 mg total) by mouth every 12 (twelve) hours  0   • traZODone (DESYREL) 100 mg tablet Take 100 mg by mouth daily at bedtime       No current facility-administered medications for this visit.      Allergies   Allergen Reactions   • Flexeril [Cyclobenzaprine] Drowsiness   • Lisinopril Cough   • Nsaids Confusion       Objective   Vitals: Blood pressure 122/70, pulse (!) 48. Invasive Devices     Peripheral Intravenous Line  Duration           Peripheral IV 08/18/23 Right Antecubital 10 days                Physical Exam  Vitals reviewed. Constitutional:       Appearance: Normal appearance. He is well-developed. Comments: Examined in wheelchair. HENT:      Head: Normocephalic and atraumatic. Eyes:      Extraocular Movements: Extraocular movements intact. Conjunctiva/sclera: Conjunctivae normal.      Comments: No lid lag, stare, proptosis, or periorbital edema. Neck:      Thyroid: No thyromegaly. Vascular: No carotid bruit. Comments: Thyroid irregular. No palpable thyroid nodules. Thyroid not enlarged. No bruits over the thyroid gland. No lymphadenopathy of the neck. Cardiovascular:      Rate and Rhythm: Normal rate and regular rhythm. Heart sounds: Normal heart sounds. No murmur heard. Pulmonary:      Effort: Pulmonary effort is normal.      Breath sounds: Normal breath sounds. No wheezing. Abdominal:      General: Bowel sounds are normal.      Palpations: Abdomen is soft. Tenderness: There is no abdominal tenderness. Musculoskeletal:         General: No deformity. Cervical back: Normal range of motion and neck supple. Right lower leg: No edema. Left lower leg: No edema. Comments: No tremor of the outstretched hands. Lymphadenopathy:      Cervical: No cervical adenopathy. Skin:     General: Skin is warm and dry. Findings: No erythema or rash. Neurological:      Mental Status: He is alert and oriented to person, place, and time. Deep Tendon Reflexes: Reflexes are normal and symmetric. Comments: Patellar deep tendon reflexes normal.         The history was obtained from the review of the chart and from the patient and wife. Lab Results:      Blood work performed on 1/13/2023 showed a TSH of 3.437 and blood work on 3/23/2019 showed a TSH 1.87.     Lab Results   Component Value Date    CREATININE 0.92 07/12/2023    CREATININE 0.95 07/10/2023    CREATININE 0.98 07/09/2023    BUN 15 07/12/2023    K 3.5 07/12/2023     (H) 07/12/2023    CO2 24 07/12/2023     eGFR   Date Value Ref Range Status   07/12/2023 87 ml/min/1.73sq m Final   04/16/2019 81 ml/min/1.73sq m Final         Lab Results   Component Value Date    HDL 30 (L) 07/02/2023    TRIG 168 (H) 07/02/2023       Lab Results   Component Value Date    ALT 15 07/10/2023    AST 12 (L) 07/10/2023    ALKPHOS 80 07/10/2023       Thyroid ultrasound:    THYROID ULTRASOUND performed on 6/1/2023     INDICATION:    Eval multiple thyroid nodules seen on CTA.     COMPARISON: CTA head and neck 5/31/2023     TECHNIQUE:   Ultrasound of the thyroid was performed with a high frequency linear transducer in transverse and sagittal planes including volumetric imaging sweeps as well as traditional still imaging technique.     FINDINGS:  Normal homogeneous smooth echotexture.     Right lobe: 5.1 x 2.2 x 2.1 cm. Volume 11.5 mL  Left lobe:  4.5 x 2.1 x 1.2 cm. Volume 5.4 mL  Isthmus: 0.3  cm.     Nodule #1. Image 25. RIGHT upper pole nodule measuring 1.6 x 1.0 x 1.4 cm. No priors for comparison. COMPOSITION:  2 points, solid or almost completely solid . ECHOGENICITY:  2 points, hypoechoic. SHAPE:  0 points, wider-than-tall. MARGIN: 0 points, smooth. ECHOGENIC FOCI:  0 points, none or large comet-tail artifacts. TI-RADS Classification: TR 4 (4-6 points),  FNA if > 1.5 cm. Follow if > 1cm.     Nodule #2. Image 30. RIGHT lower pole nodule measuring 1.5 x 0.9 x 1.5 cm. No priors for comparison. COMPOSITION:  1 point, mixed cystic and solid. ECHOGENICITY:  1 point, hyperechoic or isoechoic. SHAPE:  0 points, wider-than-tall. MARGIN: 0 points, smooth. ECHOGENIC FOCI:  0 points, none or large comet-tail artifacts. TI-RADS Classification: TR 2 (2 points), Not suspicious.  No FNA.     Additional cystic nodules do not meet criteria for follow-up.            IMPRESSION:     The following meet current ACR criteria for recommending ultrasound guided biopsy:  - RIGHT upper pole nodule measuring 1.6 x 1.0 x 1.4 cm             Future Appointments   Date Time Provider [address]   8/31/2023 12:30 PM Deysi Erickson MD NEURO [facility]   10/11/2023 10:40 AM Lay Zamarripa PA-C [facility]   12/13/2023 10:00 AM UB CT ED [address] [place] [address]   12/13/2023 10:30 AM UB CT 1 [facility] [place]   12/20/2023  2:45 PM Jeannie Whitmore MD NS[facility]   2/22/2024  9:00 AM UB HV VASCULAR 1 [facility]   2/22/2024 10:00 AM [facility] HV VASCULAR 1 [facility]   2/28/2024 10:00 AM UB HV VASCULAR 1 [facility]

## 2023-08-31 PROBLEM — G40.109 FOCAL EPILEPSY (HCC): Status: ACTIVE | Noted: 2023-08-31

## 2023-09-10 ENCOUNTER — APPOINTMENT (EMERGENCY)
Dept: CT IMAGING | Facility: HOSPITAL | Age: 63
End: 2023-09-10
Payer: COMMERCIAL

## 2023-09-10 ENCOUNTER — HOSPITAL ENCOUNTER (EMERGENCY)
Facility: HOSPITAL | Age: 63
Discharge: HOME | End: 2023-09-10
Attending: EMERGENCY MEDICINE
Payer: COMMERCIAL

## 2023-09-10 VITALS
SYSTOLIC BLOOD PRESSURE: 163 MMHG | HEART RATE: 73 BPM | DIASTOLIC BLOOD PRESSURE: 77 MMHG | OXYGEN SATURATION: 97 % | TEMPERATURE: 97.4 F | RESPIRATION RATE: 18 BRPM

## 2023-09-10 DIAGNOSIS — T50.905A ADVERSE EFFECT OF DRUG, INITIAL ENCOUNTER: ICD-10-CM

## 2023-09-10 DIAGNOSIS — H53.8 BLURRY VISION, BILATERAL: Primary | ICD-10-CM

## 2023-09-10 LAB
ALBUMIN SERPL BCP-MCNC: 4.1 G/DL (ref 3.5–5)
ALP SERPL-CCNC: 77 U/L (ref 34–104)
ALT SERPL W P-5'-P-CCNC: 10 U/L (ref 7–52)
ANION GAP SERPL CALCULATED.3IONS-SCNC: 5 MMOL/L
APTT PPP: 27 SECONDS (ref 23–37)
AST SERPL W P-5'-P-CCNC: 13 U/L (ref 13–39)
BASOPHILS # BLD AUTO: 0.09 THOUSANDS/ÂΜL (ref 0–0.1)
BASOPHILS NFR BLD AUTO: 1 % (ref 0–1)
BILIRUB SERPL-MCNC: 0.38 MG/DL (ref 0.2–1)
BUN SERPL-MCNC: 21 MG/DL (ref 5–25)
CALCIUM SERPL-MCNC: 9.2 MG/DL (ref 8.4–10.2)
CHLORIDE SERPL-SCNC: 108 MMOL/L (ref 96–108)
CO2 SERPL-SCNC: 25 MMOL/L (ref 21–32)
CREAT SERPL-MCNC: 0.98 MG/DL (ref 0.6–1.3)
EOSINOPHIL # BLD AUTO: 0.44 THOUSAND/ÂΜL (ref 0–0.61)
EOSINOPHIL NFR BLD AUTO: 4 % (ref 0–6)
ERYTHROCYTE [DISTWIDTH] IN BLOOD BY AUTOMATED COUNT: 13.1 % (ref 11.6–15.1)
GFR SERPL CREATININE-BSD FRML MDRD: 81 ML/MIN/1.73SQ M
GLUCOSE SERPL-MCNC: 121 MG/DL (ref 65–140)
HCT VFR BLD AUTO: 35.6 % (ref 36.5–49.3)
HGB BLD-MCNC: 11.7 G/DL (ref 12–17)
IMM GRANULOCYTES # BLD AUTO: 0.06 THOUSAND/UL (ref 0–0.2)
IMM GRANULOCYTES NFR BLD AUTO: 1 % (ref 0–2)
INR PPP: 0.93 (ref 0.84–1.19)
LYMPHOCYTES # BLD AUTO: 3.24 THOUSANDS/ÂΜL (ref 0.6–4.47)
LYMPHOCYTES NFR BLD AUTO: 29 % (ref 14–44)
MCH RBC QN AUTO: 32.2 PG (ref 26.8–34.3)
MCHC RBC AUTO-ENTMCNC: 32.9 G/DL (ref 31.4–37.4)
MCV RBC AUTO: 98 FL (ref 82–98)
MONOCYTES # BLD AUTO: 0.76 THOUSAND/ÂΜL (ref 0.17–1.22)
MONOCYTES NFR BLD AUTO: 7 % (ref 4–12)
NEUTROPHILS # BLD AUTO: 6.44 THOUSANDS/ÂΜL (ref 1.85–7.62)
NEUTS SEG NFR BLD AUTO: 58 % (ref 43–75)
NRBC BLD AUTO-RTO: 0 /100 WBCS
PLATELET # BLD AUTO: 294 THOUSANDS/UL (ref 149–390)
PMV BLD AUTO: 9.8 FL (ref 8.9–12.7)
POTASSIUM SERPL-SCNC: 3.9 MMOL/L (ref 3.5–5.3)
PROT SERPL-MCNC: 6.7 G/DL (ref 6.4–8.4)
PROTHROMBIN TIME: 13.1 SECONDS (ref 11.6–14.5)
RBC # BLD AUTO: 3.63 MILLION/UL (ref 3.88–5.62)
SODIUM SERPL-SCNC: 138 MMOL/L (ref 135–147)
WBC # BLD AUTO: 11.03 THOUSAND/UL (ref 4.31–10.16)

## 2023-09-10 PROCEDURE — G1004 CDSM NDSC: HCPCS

## 2023-09-10 PROCEDURE — 85610 PROTHROMBIN TIME: CPT | Performed by: EMERGENCY MEDICINE

## 2023-09-10 PROCEDURE — 85025 COMPLETE CBC W/AUTO DIFF WBC: CPT | Performed by: EMERGENCY MEDICINE

## 2023-09-10 PROCEDURE — 36415 COLL VENOUS BLD VENIPUNCTURE: CPT | Performed by: EMERGENCY MEDICINE

## 2023-09-10 PROCEDURE — 70496 CT ANGIOGRAPHY HEAD: CPT

## 2023-09-10 PROCEDURE — 85730 THROMBOPLASTIN TIME PARTIAL: CPT | Performed by: EMERGENCY MEDICINE

## 2023-09-10 PROCEDURE — 80177 DRUG SCRN QUAN LEVETIRACETAM: CPT | Performed by: EMERGENCY MEDICINE

## 2023-09-10 PROCEDURE — 80053 COMPREHEN METABOLIC PANEL: CPT | Performed by: EMERGENCY MEDICINE

## 2023-09-10 PROCEDURE — 99284 EMERGENCY DEPT VISIT MOD MDM: CPT

## 2023-09-10 PROCEDURE — 70498 CT ANGIOGRAPHY NECK: CPT

## 2023-09-10 PROCEDURE — 99285 EMERGENCY DEPT VISIT HI MDM: CPT | Performed by: EMERGENCY MEDICINE

## 2023-09-10 RX ORDER — LOSARTAN POTASSIUM 25 MG/1
50 TABLET ORAL ONCE
Status: DISCONTINUED | OUTPATIENT
Start: 2023-09-10 | End: 2023-09-10 | Stop reason: HOSPADM

## 2023-09-10 RX ORDER — TETRACAINE HYDROCHLORIDE 5 MG/ML
2 SOLUTION OPHTHALMIC ONCE
Status: COMPLETED | OUTPATIENT
Start: 2023-09-10 | End: 2023-09-10

## 2023-09-10 RX ADMIN — IOHEXOL 90 ML: 350 INJECTION, SOLUTION INTRAVENOUS at 18:06

## 2023-09-10 RX ADMIN — TETRACAINE HYDROCHLORIDE 2 DROP: 5 SOLUTION OPHTHALMIC at 18:11

## 2023-09-10 RX ADMIN — FLUORESCEIN SODIUM 1 STRIP: 1 STRIP OPHTHALMIC at 18:11

## 2023-09-10 NOTE — ED PROVIDER NOTES
History  Chief Complaint   Patient presents with   • Blurred Vision     Pt reports starting [place] yesterday. Today at 330pm he noticed that his vision was getting blurry while watching TV. Hx from patient, medical records, and spouse. PMH stroke with chronic aphasia and visual disturbance, depression, dm, htn, back surgery, cerebral angiogrpahy. Patient brought in by wife with concern for blurred vision both eyes, a little slower than normal today after taking new medication Keppra - symptoms started around 1030 am about 1 hour after taking the keppra. Similar incident twice yesterday, both times after taking the [place]. Just started the keppra yesterday for seizures described as zoning out. Denies headache, no new weakness or numbness. Prior to Admission Medications   Prescriptions Last Dose Informant Patient Reported? Taking?   acetaminophen (TYLENOL) 325 mg tablet  Spouse/Significant Other No No   Sig: Take 3 tablets (975 mg total) by mouth 3 (three) times a day as needed for mild pain, headaches or fever   aspirin (ECOTRIN LOW STRENGTH) 81 mg EC tablet  Spouse/Significant Other No No   Sig: Take 1 tablet (81 mg total) by mouth daily Do not start before April 19, 2023. atorvastatin (LIPITOR) 40 mg tablet  Spouse/Significant Other Yes No   Sig: Take 40 mg by mouth daily with breakfast   baclofen 5 MG TABS  Spouse/Significant Other No No   Sig: Take 5 mg by mouth 2 (two) times a day as needed for muscle spasms   cilostazol (PLETAL) 100 mg tablet  Spouse/Significant Other No No   Sig: Take 1 tablet (100 mg total) by mouth 2 (two) times a day before meals Do not start before June 6, 2023.    citalopram (CeleXA) 20 mg tablet  Spouse/Significant Other Yes No   Sig: Take 20 mg by mouth daily   donepezil (ARICEPT) 10 mg tablet  Spouse/Significant Other No No   Sig: Take 1 tablet (10 mg total) by mouth in the morning   levETIRAcetam (Keppra) 500 mg tablet   No No   Sig: Take 1 tab (500 mg) twice a day for 1 week, then take 2 tabs (1000 mg) twice a day.    lidocaine (Lidoderm) 5 %  Spouse/Significant Other No No   Sig: Apply 1 patch topically over 12 hours daily Remove & Discard patch within 12 hours or as directed by MD   losartan (COZAAR) 50 mg tablet  Spouse/Significant Other Yes No   Sig: Take 50 mg by mouth daily   metFORMIN (GLUCOPHAGE) 500 mg tablet  Spouse/Significant Other Yes No   Sig: Take 500 mg by mouth daily with breakfast   metoprolol succinate (TOPROL-XL) 100 mg 24 hr tablet  Spouse/Significant Other Yes No   Sig: Take 100 mg by mouth daily   pantoprazole (PROTONIX) 40 mg tablet  Spouse/Significant Other No No   Sig: Take 1 tablet (40 mg total) by mouth daily   tamsulosin (FLOMAX) 0.4 mg  Spouse/Significant Other No No   Sig: Take 1 capsule (0.4 mg total) by mouth daily with dinner   ticagrelor (BRILINTA) 90 MG  Spouse/Significant Other No No   Sig: Take 1 tablet (90 mg total) by mouth every 12 (twelve) hours   traZODone (DESYREL) 100 mg tablet  Spouse/Significant Other Yes No   Sig: Take 100 mg by mouth daily at bedtime      Facility-Administered Medications: None       Past Medical History:   Diagnosis Date   • Depression    • Diabetes mellitus ([address])     patient denies   • Dyslipidemia 03/26/2019   • GERD (gastroesophageal reflux disease)    • Hyperlipidemia    • Hypertension    • Prediabetes    • Stroke [facility])        Past Surgical History:   Procedure Laterality Date   • ARTHROSCOPIC REPAIR ACL Left    • BACK SURGERY      twice   • CARPAL TUNNEL RELEASE Right    • IR CEREBRAL ANGIOGRAPHY  05/04/2023   • IR STROKE ALERT  03/19/2019   • SHOULDER SURGERY Left        Family History   Problem Relation Age of Onset   • Hyperlipidemia Mother    • Hypertension Mother    • Diabetes unspecified Mother         prediabetes   • Heart attack Mother    • Diabetes Mother    • Hyperlipidemia Father    • Hypertension Father    • Prostate cancer Father    • Stroke Father    • Hyperlipidemia Sister    • Hypertension Sister    • Hyperlipidemia Sister    • Hypertension Sister    • Depression Sister    • Hypertension Sister    • Hyperlipidemia Sister    • Depression Sister    • Hyperlipidemia Brother    • Hypertension Brother    • Hypertension Brother    • Prostate cancer Brother    • Hypothyroidism Daughter    • Hypothyroidism Son    • Diabetes unspecified Son    • Seizures Neg Hx      I have reviewed and agree with the history as documented. E-Cigarette/Vaping   • E-Cigarette Use Never User      E-Cigarette/Vaping Substances   • Nicotine No    • THC No    • CBD No    • Flavoring No    • Other No    • Unknown No      Social History     Tobacco Use   • Smoking status: Former   • Smokeless tobacco: Never   Vaping Use   • Vaping Use: Never used   Substance Use Topics   • Alcohol use: Never   • Drug use: Never       Review of Systems   Constitutional: Negative for chills and fever. HENT: Negative for rhinorrhea and sore throat. Respiratory: Negative for shortness of breath. Cardiovascular: Negative for chest pain. Gastrointestinal: Negative for constipation, diarrhea, nausea and vomiting. Genitourinary: Negative for dysuria and frequency. Skin: Negative for rash. Neurological: Negative for dizziness, weakness and numbness. Psychiatric/Behavioral: Positive for confusion and decreased concentration. Negative for suicidal ideas. All other systems reviewed and are negative. Physical Exam  Physical Exam  Vitals and nursing note reviewed. Constitutional:       Appearance: He is well-developed. HENT:      Head: Normocephalic and atraumatic. Right Ear: Tympanic membrane and external ear normal.      Left Ear: Tympanic membrane and external ear normal.      Nose: Nose normal.      Mouth/Throat:      Mouth: Mucous membranes are moist.      Pharynx: Oropharynx is clear. Eyes:      General: Vision grossly intact. Visual field deficit present.       Extraocular Movements: Extraocular movements intact. Conjunctiva/sclera: Conjunctivae normal.      Right eye: Right conjunctiva is not injected. Left eye: Left conjunctiva is not injected. Pupils: Pupils are equal, round, and reactive to light. Right eye: No corneal abrasion or fluorescein uptake. Left eye: No corneal abrasion or fluorescein uptake. Comments: Right eye lateral field visual loss patient and wife state this is chronic. Cardiovascular:      Rate and Rhythm: Normal rate and regular rhythm. Heart sounds: Normal heart sounds. Pulmonary:      Effort: Pulmonary effort is normal. No respiratory distress. Breath sounds: Normal breath sounds. No wheezing. Abdominal:      General: Bowel sounds are normal. There is no distension. Palpations: Abdomen is soft. Tenderness: There is no abdominal tenderness. Musculoskeletal:         General: No deformity. Normal range of motion. Cervical back: Normal range of motion and neck supple. No spinous process tenderness. Skin:     General: Skin is warm and dry. Findings: No rash. Neurological:      General: No focal deficit present. Mental Status: He is alert. He is disoriented. GCS: GCS eye subscore is 4. GCS verbal subscore is 4. GCS motor subscore is 6. Cranial Nerves: No cranial nerve deficit, dysarthria or facial asymmetry. Sensory: Sensation is intact. No sensory deficit. Motor: Motor function is intact. Coordination: Coordination is intact.       Comments: Slow to respond to answers and some answers incorrect - wife states this is about baseline currently   Psychiatric:         Mood and Affect: Mood normal.         Vital Signs  ED Triage Vitals   Temperature Pulse Respirations Blood Pressure SpO2   09/10/23 1624 09/10/23 1622 09/10/23 1622 09/10/23 1622 09/10/23 1622   (!) 97.4 °F (36.3 °C) 76 18 144/66 97 %      Temp Source Heart Rate Source Patient Position - Orthostatic VS BP Location FiO2 (%)   09/10/23 1624 09/10/23 1800 09/10/23 1800 09/10/23 1800 --   Oral Monitor Lying Right arm       Pain Score       --                  Vitals:    09/10/23 1622 09/10/23 1800 09/10/23 1830 09/10/23 2030   BP: 144/66 168/76 (!) 187/79 163/77   Pulse: 76 60 62 73   Patient Position - Orthostatic VS:  Lying Lying Lying         Visual Acuity  Visual Acuity    Flowsheet Row Most Recent Value   Visual acuity in both eyes is 20/30   Unable to obtain visual acuity due to Expressive aphagia prevented full visual acuity screening   Wearing corrective eyewear/lenses?  No   No corrective eyewear/lenses Yes          ED Medications  Medications   losartan (COZAAR) tablet 50 mg (50 mg Oral Not Given 9/10/23 2058)   tetracaine 0.5 % ophthalmic solution 2 drop (2 drops Both Eyes Given by Other 9/10/23 1811)   fluorescein sodium sterile ophthalmic strip 1 strip (1 strip Both Eyes Given by Other 9/10/23 1811)   iohexol (OMNIPAQUE) 350 MG/ML injection (SINGLE-DOSE) 100 mL (90 mL Intravenous Given 9/10/23 1806)       Diagnostic Studies  Results Reviewed     Procedure Component Value Units Date/Time    Protime-INR [[id number]]  (Normal) Collected: 09/10/23 1648    Lab Status: Final result Specimen: Blood from Arm, Right Updated: 09/10/23 1720     Protime 13.1 seconds      INR 0.93    APTT [[id number]]  (Normal) Collected: 09/10/23 1648    Lab Status: Final result Specimen: Blood from Arm, Right Updated: 09/10/23 1720     PTT 27 seconds     Comprehensive metabolic panel [[id number]] Collected: 09/10/23 1648    Lab Status: Final result Specimen: Blood from Arm, Right Updated: 09/10/23 1717     Sodium 138 mmol/L      Potassium 3.9 mmol/L      Chloride 108 mmol/L      CO2 25 mmol/L      ANION GAP 5 mmol/L      BUN 21 mg/dL      Creatinine 0.98 mg/dL      Glucose 121 mg/dL      Calcium 9.2 mg/dL      AST 13 U/L      ALT 10 U/L      Alkaline Phosphatase 77 U/L      Total Protein 6.7 g/dL      Albumin 4.1 g/dL      Total Bilirubin 0.38 mg/dL      eGFR 81 ml/min/1.73sq m     Narrative:      National Kidney Disease Foundation guidelines for Chronic Kidney Disease (CKD):   •  Stage 1 with normal or high GFR (GFR > 90 mL/min/1.73 square meters)  •  Stage 2 Mild CKD (GFR = 60-89 mL/min/1.73 square meters)  •  Stage 3A Moderate CKD (GFR = 45-59 mL/min/1.73 square meters)  •  Stage 3B Moderate CKD (GFR = 30-44 mL/min/1.73 square meters)  •  Stage 4 Severe CKD (GFR = 15-29 mL/min/1.73 square meters)  •  Stage 5 End Stage CKD (GFR <15 mL/min/1.73 square meters)  Note: GFR calculation is accurate only with a steady state creatinine    CBC and differential [[id number]]  (Abnormal) Collected: 09/10/23 1648    Lab Status: Final result Specimen: Blood from Arm, Right Updated: 09/10/23 1655     WBC 11.03 Thousand/uL      RBC 3.63 Million/uL      Hemoglobin 11.7 g/dL      Hematocrit 35.6 %      MCV 98 fL      MCH 32.2 pg      MCHC 32.9 g/dL      RDW 13.1 %      MPV 9.8 fL      Platelets 356 Thousands/uL      nRBC 0 /100 WBCs      Neutrophils Relative 58 %      Immat GRANS % 1 %      Lymphocytes Relative 29 %      Monocytes Relative 7 %      Eosinophils Relative 4 %      Basophils Relative 1 %      Neutrophils Absolute 6.44 Thousands/µL      Immature Grans Absolute 0.06 Thousand/uL      Lymphocytes Absolute 3.24 Thousands/µL      Monocytes Absolute 0.76 Thousand/µL      Eosinophils Absolute 0.44 Thousand/µL      Basophils Absolute 0.09 Thousands/µL     Levetiracetam level [[id number]] Collected: 09/10/23 1648    Lab Status: In process Specimen: Blood from Arm, Right Updated: 09/10/23 1651    UA w Reflex to Microscopic w Reflex to Culture [[id number]]     Lab Status: No result Specimen: Urine                  CTA head and neck with and without contrast   Final Result by Boaz Cheng MD (09/10 2000)         1. Stable mild to moderate stenosis at the right ICA origin. 2. Patent left ICA stent with mild narrowing.    3. Stable severe stenosis in the distal M1/proximal right M2 branch. No evidence of large vessel occlusion or acute thrombus. 4. No evidence of acute vascular territorial infarction, intracranial hemorrhage or mass. Workstation performed: [id number]                    Procedures  Procedures         ED Course       reviewed with neuro Dr. Lio Lopez via tiger text - side effects will probably resolve within 1-2 weeks if patient can tolerate it. Otherwise switch to oxcarbazepine 300 bid                        SBIRT 22yo+    Flowsheet Row Most Recent Value   Initial Alcohol Screen: US AUDIT-C     1. How often do you have a drink containing alcohol? 0 Filed at: 09/10/2023 1630   2. How many drinks containing alcohol do you have on a typical day you are drinking? 0 Filed at: 09/10/2023 1630   3a. Male UNDER 65: How often do you have five or more drinks on one occasion? 0 Filed at: 09/10/2023 1630   3b. FEMALE Any Age, or MALE 65+: How often do you have 4 or more drinks on one occassion? 0 Filed at: 09/10/2023 1630   Audit-C Score 0 Filed at: 09/10/2023 1630   WILLIAM: How many times in the past year have you. .. Used an illegal drug or used a prescription medication for non-medical reasons? Never Filed at: 09/10/2023 1630                    Medical Decision Making  Differential includes side effect of Keppra with blurred vision and fatigue confusion, also consider exacerbation of chronic stroke symptoms from addition of new medication. Less likely an acute stroke no new focal deficits. Less likely infection. Patient developed some hypertension before discharge but he was due for his medication. Less likely hypertensive urgency, dehydration, or electrolyte abnormalities. Amount and/or Complexity of Data Reviewed  Labs: ordered. Radiology: ordered. Risk  Prescription drug management.           Disposition  Final diagnoses:   Blurry vision, bilateral   Adverse effect of drug, initial encounter - fatigue from keppra     Time reflects when diagnosis was documented in both MDM as applicable and the Disposition within this note     Time User Action Codes Description Comment    9/10/2023  8:33 PM Adenike Thacker Add [H53.8] Blurry vision, bilateral     9/10/2023  8:33 PM Adenike Thacker Add [T50.905A] Adverse effect of drug, initial encounter     9/10/2023  8:33 PM Konrad, [address] Adverse effect of drug, initial encounter fatigue from [facility]      ED Disposition     ED Disposition   Discharge    Condition   Stable    Date/Time   Sun Sep 10, 2023  8:33 PM    [address] discharge to home/self care.                Follow-up Information     Follow up With Specialties Details Why Contact Info Additional Information    [facility] Call   [place] [address] [facility] [address]  [address] [facility] [address] [place] [address], [address] [place] [address], [address] [place] [address], [place], [place], [address]          Discharge Medication List as of 9/10/2023  8:34 PM      CONTINUE these medications which have NOT CHANGED    Details   acetaminophen (TYLENOL) 325 mg tablet Take 3 tablets (975 mg total) by mouth 3 (three) times a day as needed for mild pain, headaches or fever, Starting Mon 6/5/2023, No Print      aspirin (ECOTRIN LOW STRENGTH) 81 mg EC tablet Take 1 tablet (81 mg total) by mouth daily Do not start before April 19, 2023., Starting Wed 4/19/2023, Normal      atorvastatin (LIPITOR) 40 mg tablet Take 40 mg by mouth daily with breakfast, Historical Med      baclofen 5 MG TABS Take 5 mg by mouth 2 (two) times a day as needed for muscle spasms, Starting Mon 6/5/2023, No Print      cilostazol (PLETAL) 100 mg tablet Take 1 tablet (100 mg total) by mouth 2 (two) times a day before meals Do not start before June 6, 2023., Starting Tue 6/6/2023, No Print      citalopram (CeleXA) 20 mg tablet Take 20 mg by mouth daily, Historical Med      donepezil (ARICEPT) 10 mg tablet Take 1 tablet (10 mg total) by mouth in the morning, Starting Tue 6/13/2023, Normal      levETIRAcetam (Keppra) 500 mg tablet Take 1 tab (500 mg) twice a day for 1 week, then take 2 tabs (1000 mg) twice a day., Normal      lidocaine (Lidoderm) 5 % Apply 1 patch topically over 12 hours daily Remove & Discard patch within 12 hours or as directed by MD, Starting Wed 4/26/2023, Normal      losartan (COZAAR) 50 mg tablet Take 50 mg by mouth daily, Historical Med      metFORMIN (GLUCOPHAGE) 500 mg tablet Take 500 mg by mouth daily with breakfast, Historical Med      metoprolol succinate (TOPROL-XL) 100 mg 24 hr tablet Take 100 mg by mouth daily, Historical Med      pantoprazole (PROTONIX) 40 mg tablet Take 1 tablet (40 mg total) by mouth daily, Starting Tue 6/20/2023, Print      tamsulosin (FLOMAX) 0.4 mg Take 1 capsule (0.4 mg total) by mouth daily with dinner, Starting Tue 6/6/2023, No Print      ticagrelor (BRILINTA) 90 MG Take 1 tablet (90 mg total) by mouth every 12 (twelve) hours, Starting Fri 5/12/2023, No Print      traZODone (DESYREL) 100 mg tablet Take 100 mg by mouth daily at bedtime, Starting Wed 6/28/2023, Historical Med             No discharge procedures on file.     PDMP Review       Value Time User    PDMP Reviewed  Yes 6/5/2023  9:51 PM Julius Mancini MD          ED Provider  Electronically Signed by           Josue Goodwin DO  09/10/23 [id number]

## 2023-09-14 LAB — LEVETIRACETAM SERPL-MCNC: 10.6 UG/ML (ref 10–40)

## 2023-09-15 ENCOUNTER — TELEPHONE (OUTPATIENT)
Dept: NEUROLOGY | Facility: CLINIC | Age: 63
End: 2023-09-15

## 2023-09-15 DIAGNOSIS — I69.30 CHRONIC ISCHEMIC RIGHT MCA STROKE: Primary | ICD-10-CM

## 2023-09-15 DIAGNOSIS — G40.109 FOCAL EPILEPSY (HCC): ICD-10-CM

## 2023-09-15 NOTE — TELEPHONE ENCOUNTER
Monica Ashley, patient wife called to speak with the nurse. Contacted nurse team 1 to call Monica Ashley regarding patient Keppra.  The nurse will call Monica Ashley back at [phone]

## 2023-09-15 NOTE — TELEPHONE ENCOUNTER
received vm from 9/14 at 1:48pm-A hi, this message is for Dr. Fazal Blokc. Hi, Dr. Fazal Block. My name is Jina Hinojosa, and I am the occupational therapist with [facility] [employer]. His YOB: 1960. I was just calling to um, a few things. Wanted to make sure that you were informed That was on September 10th, jailyn went to [facility]'s ER. Because of some symptoms, he was having that he and wife believe are from the keppra. He had really, really impaired vision and was really, really out of it. He went to the ER they did not admit him, they sent him back home. On Monday, September 11th. He did lose his balance and had a fall, you know, again, due to these symptoms he's having on with the keppra. He lost his balance, did not have an injury, didn't hit his head, but the wife found him in the bathroom like kind of hanging over the tube, again, No injury. But the concern that his wife soni was having is that he was instructed, that she was instructed to increase the dose of keppra this upcoming saturday. However, she's concerned to do that due to the reaction that he's been having, especially with his vision and just overall balance. So hoping if you could either give me a call back, [phone]. Or better yet please. If you could call his wife Keerthi Velez, her phone number is [phone]. Thank you so so much. We really appreciate it. Hope you have a great day. Thank you.  -------------------------------------------------  Pt also saw dr Fazal Block on 8/31 and per office note-  Please start taking Levetiracetam (Keppra) 1 tab (500 mg) twice a day for 1 week, then take 2 tabs (1000 mg) twice a day.      Left message for pt's wife to call office back    Dr Fazal Block -Any recommendations at this time  Please also see vicki's message

## 2023-09-15 NOTE — TELEPHONE ENCOUNTER
Recd vm 9/14 taken off 9/15   my name is Breanna Suh, and I am an occupational therapist with the home Care agency [facility]. And I am calling on behalf of patient Azra Toth. His YOB: 1960. And the reason for my call is I was hoping to see if the neurologist could potentially refer him to a neuro ophthalmologist or opthalmologist. He is really struggling with his vision. I did some preliminary testing and he's presenting almost like he has a right  homonymous hemianopsia. My phone number is [phone].      Last visit 8/2 Dr. Nano Suero, hx of MCA stroke

## 2023-09-15 NOTE — TELEPHONE ENCOUNTER
Those symptoms are not reported side effects that we see due to Levetiracetam. This medication really would not affect his vision. Given his prior vascular issues/strokes, it may be best to reevaluate with another MRI of his brain. I would recommend he just keep the Levetiracetam at 500 mg twice a day for now while we get more information. I am ordering the MRI now. I will order a Levetiracetam level as well to assess his medication amount    If his symptoms are to where he is not able to walk or he is having significant abrupt changes, he may need to go back to the ED for more urgent evaluation.

## 2023-09-15 NOTE — TELEPHONE ENCOUNTER
Call placed to Juana Nieves, [phone]. Reports patient only sleepy and weak. no visual issues now at this time. Reports patient is currently taking 500mg of levetiracetam BID. She does not want to increase it further. States patient does well, takes the levetiracetam and symptoms start, subside throughout the day and nearly resolve by the time the next dose is due and then they return all over again. She is agreeable to MRI. Will call to schedule. Advised of ED recommendation for any urgent change in symptoms.

## 2023-10-02 ENCOUNTER — HOSPITAL ENCOUNTER (EMERGENCY)
Facility: HOSPITAL | Age: 63
Discharge: HOME | End: 2023-10-02
Attending: EMERGENCY MEDICINE | Admitting: EMERGENCY MEDICINE
Payer: COMMERCIAL

## 2023-10-02 VITALS
HEART RATE: 79 BPM | SYSTOLIC BLOOD PRESSURE: 158 MMHG | HEIGHT: 68 IN | WEIGHT: 195 LBS | OXYGEN SATURATION: 98 % | TEMPERATURE: 98.5 F | DIASTOLIC BLOOD PRESSURE: 72 MMHG | RESPIRATION RATE: 18 BRPM | BODY MASS INDEX: 29.55 KG/M2

## 2023-10-02 DIAGNOSIS — K92.1 HEMATOCHEZIA: Primary | ICD-10-CM

## 2023-10-02 LAB
2HR DELTA HS TROPONIN: -1 NG/L
ALBUMIN SERPL BCP-MCNC: 4.1 G/DL (ref 3.5–5)
ALP SERPL-CCNC: 86 U/L (ref 34–104)
ALT SERPL W P-5'-P-CCNC: 13 U/L (ref 7–52)
ANION GAP SERPL CALCULATED.3IONS-SCNC: 6 MMOL/L
APTT PPP: 28 SECONDS (ref 23–37)
AST SERPL W P-5'-P-CCNC: 14 U/L (ref 13–39)
BASOPHILS # BLD AUTO: 0.08 THOUSANDS/ÂΜL (ref 0–0.1)
BASOPHILS NFR BLD AUTO: 1 % (ref 0–1)
BILIRUB SERPL-MCNC: 0.4 MG/DL (ref 0.2–1)
BUN SERPL-MCNC: 22 MG/DL (ref 5–25)
CALCIUM SERPL-MCNC: 9.9 MG/DL (ref 8.4–10.2)
CARDIAC TROPONIN I PNL SERPL HS: 10 NG/L
CARDIAC TROPONIN I PNL SERPL HS: 11 NG/L
CHLORIDE SERPL-SCNC: 105 MMOL/L (ref 96–108)
CO2 SERPL-SCNC: 27 MMOL/L (ref 21–32)
CREAT SERPL-MCNC: 1.23 MG/DL (ref 0.6–1.3)
EOSINOPHIL # BLD AUTO: 0.16 THOUSAND/ÂΜL (ref 0–0.61)
EOSINOPHIL NFR BLD AUTO: 2 % (ref 0–6)
ERYTHROCYTE [DISTWIDTH] IN BLOOD BY AUTOMATED COUNT: 12.6 % (ref 11.6–15.1)
GFR SERPL CREATININE-BSD FRML MDRD: 61 ML/MIN/1.73SQ M
GLUCOSE SERPL-MCNC: 117 MG/DL (ref 65–140)
HCT VFR BLD AUTO: 40.3 % (ref 36.5–49.3)
HGB BLD-MCNC: 13 G/DL (ref 12–17)
IMM GRANULOCYTES # BLD AUTO: 0.04 THOUSAND/UL (ref 0–0.2)
IMM GRANULOCYTES NFR BLD AUTO: 1 % (ref 0–2)
INR PPP: 0.94 (ref 0.84–1.19)
LYMPHOCYTES # BLD AUTO: 1.22 THOUSANDS/ÂΜL (ref 0.6–4.47)
LYMPHOCYTES NFR BLD AUTO: 15 % (ref 14–44)
MCH RBC QN AUTO: 31 PG (ref 26.8–34.3)
MCHC RBC AUTO-ENTMCNC: 32.3 G/DL (ref 31.4–37.4)
MCV RBC AUTO: 96 FL (ref 82–98)
MONOCYTES # BLD AUTO: 0.81 THOUSAND/ÂΜL (ref 0.17–1.22)
MONOCYTES NFR BLD AUTO: 10 % (ref 4–12)
NEUTROPHILS # BLD AUTO: 5.98 THOUSANDS/ÂΜL (ref 1.85–7.62)
NEUTS SEG NFR BLD AUTO: 71 % (ref 43–75)
NRBC BLD AUTO-RTO: 0 /100 WBCS
PLATELET # BLD AUTO: 263 THOUSANDS/UL (ref 149–390)
PMV BLD AUTO: 10 FL (ref 8.9–12.7)
POTASSIUM SERPL-SCNC: 4.1 MMOL/L (ref 3.5–5.3)
PROT SERPL-MCNC: 7 G/DL (ref 6.4–8.4)
PROTHROMBIN TIME: 13.3 SECONDS (ref 11.6–14.5)
RBC # BLD AUTO: 4.2 MILLION/UL (ref 3.88–5.62)
SODIUM SERPL-SCNC: 138 MMOL/L (ref 135–147)
WBC # BLD AUTO: 8.29 THOUSAND/UL (ref 4.31–10.16)

## 2023-10-02 PROCEDURE — 93005 ELECTROCARDIOGRAM TRACING: CPT

## 2023-10-02 PROCEDURE — 99284 EMERGENCY DEPT VISIT MOD MDM: CPT

## 2023-10-02 PROCEDURE — 85610 PROTHROMBIN TIME: CPT | Performed by: EMERGENCY MEDICINE

## 2023-10-02 PROCEDURE — 85730 THROMBOPLASTIN TIME PARTIAL: CPT | Performed by: EMERGENCY MEDICINE

## 2023-10-02 PROCEDURE — 36415 COLL VENOUS BLD VENIPUNCTURE: CPT | Performed by: EMERGENCY MEDICINE

## 2023-10-02 PROCEDURE — 99284 EMERGENCY DEPT VISIT MOD MDM: CPT | Performed by: EMERGENCY MEDICINE

## 2023-10-02 PROCEDURE — 85025 COMPLETE CBC W/AUTO DIFF WBC: CPT | Performed by: EMERGENCY MEDICINE

## 2023-10-02 PROCEDURE — 80053 COMPREHEN METABOLIC PANEL: CPT | Performed by: EMERGENCY MEDICINE

## 2023-10-02 PROCEDURE — 84484 ASSAY OF TROPONIN QUANT: CPT | Performed by: EMERGENCY MEDICINE

## 2023-10-02 NOTE — ED PROVIDER NOTES
History  Chief Complaint   Patient presents with    Rectal Bleeding     Patient presents to the ER via EMS with report of having blood on the toilet seat after having a BM. 58 yom presents with 1 episode of BRBPR noted on toilet seat shortly prior to arrival. Patient with no further complaints. Denies abdominal pain. Denies rectal pain. No lightheadedness. Patient is on pradaxa and aspirin. Otherwise feels well. Prior stroke with speech difficulty but able to communicate effectively and oriented x3. Prior to Admission Medications   Prescriptions Last Dose Informant Patient Reported? Taking?   acetaminophen (TYLENOL) 325 mg tablet  Spouse/Significant Other No No   Sig: Take 3 tablets (975 mg total) by mouth 3 (three) times a day as needed for mild pain, headaches or fever   aspirin (ECOTRIN LOW STRENGTH) 81 mg EC tablet  Spouse/Significant Other No No   Sig: Take 1 tablet (81 mg total) by mouth daily Do not start before April 19, 2023. atorvastatin (LIPITOR) 40 mg tablet  Spouse/Significant Other Yes No   Sig: Take 40 mg by mouth daily with breakfast   baclofen 5 MG TABS  Spouse/Significant Other No No   Sig: Take 5 mg by mouth 2 (two) times a day as needed for muscle spasms   cilostazol (PLETAL) 100 mg tablet  Spouse/Significant Other No No   Sig: Take 1 tablet (100 mg total) by mouth 2 (two) times a day before meals Do not start before June 6, 2023. citalopram (CeleXA) 20 mg tablet  Spouse/Significant Other Yes No   Sig: Take 20 mg by mouth daily   donepezil (ARICEPT) 10 mg tablet  Spouse/Significant Other No No   Sig: Take 1 tablet (10 mg total) by mouth in the morning   levETIRAcetam (Keppra) 500 mg tablet   No No   Sig: Take 1 tab (500 mg) twice a day for 1 week, then take 2 tabs (1000 mg) twice a day.    lidocaine (Lidoderm) 5 %  Spouse/Significant Other No No   Sig: Apply 1 patch topically over 12 hours daily Remove & Discard patch within 12 hours or as directed by MD   losartan (COZAAR) 50 mg tablet  Spouse/Significant Other Yes No   Sig: Take 50 mg by mouth daily   metFORMIN (GLUCOPHAGE) 500 mg tablet  Spouse/Significant Other Yes No   Sig: Take 500 mg by mouth daily with breakfast   metoprolol succinate (TOPROL-XL) 100 mg 24 hr tablet  Spouse/Significant Other Yes No   Sig: Take 100 mg by mouth daily   pantoprazole (PROTONIX) 40 mg tablet  Spouse/Significant Other No No   Sig: Take 1 tablet (40 mg total) by mouth daily   tamsulosin (FLOMAX) 0.4 mg  Spouse/Significant Other No No   Sig: Take 1 capsule (0.4 mg total) by mouth daily with dinner   ticagrelor (BRILINTA) 90 MG  Spouse/Significant Other No No   Sig: Take 1 tablet (90 mg total) by mouth every 12 (twelve) hours   traZODone (DESYREL) 100 mg tablet  Spouse/Significant Other Yes No   Sig: Take 100 mg by mouth daily at bedtime      Facility-Administered Medications: None       Past Medical History:   Diagnosis Date    Depression     Diabetes mellitus ([address] [facility])     patient denies    Dyslipidemia 03/26/2019    GERD (gastroesophageal reflux disease)     Hyperlipidemia     Hypertension     Prediabetes     Stroke [facility])        Past Surgical History:   Procedure Laterality Date    ARTHROSCOPIC REPAIR ACL Left     BACK SURGERY      twice    CARPAL TUNNEL RELEASE Right     IR CEREBRAL ANGIOGRAPHY  05/04/2023    IR STROKE ALERT  03/19/2019    SHOULDER SURGERY Left        Family History   Problem Relation Age of Onset    Hyperlipidemia Mother     Hypertension Mother     Diabetes unspecified Mother         prediabetes    Heart attack Mother     Diabetes Mother     Hyperlipidemia Father     Hypertension Father     Prostate cancer Father     Stroke Father     Hyperlipidemia Sister     Hypertension Sister     Hyperlipidemia Sister     Hypertension Sister     Depression Sister     Hypertension Sister     Hyperlipidemia Sister     Depression Sister     Hyperlipidemia Brother     Hypertension Brother     Hypertension Brother     Prostate cancer Brother Hypothyroidism Daughter     Hypothyroidism Son     Diabetes unspecified Son     Seizures Neg Hx      I have reviewed and agree with the history as documented. E-Cigarette/Vaping    E-Cigarette Use Never User      E-Cigarette/Vaping Substances    Nicotine No     THC No     CBD No     Flavoring No     Other No     Unknown No      Social History     Tobacco Use    Smoking status: Former    Smokeless tobacco: Never   Vaping Use    Vaping Use: Never used   Substance Use Topics    Alcohol use: Never    Drug use: Never       Review of Systems   Gastrointestinal:  Positive for blood in stool. Physical Exam  Physical Exam  Vitals and nursing note reviewed. Constitutional:       General: He is not in acute distress. Appearance: He is well-developed. HENT:      Head: Normocephalic and atraumatic. Right Ear: External ear normal.      Left Ear: External ear normal.      Nose: Nose normal.   Eyes:      General: No scleral icterus. Pulmonary:      Effort: Pulmonary effort is normal. No respiratory distress. Abdominal:      General: There is no distension. Palpations: Abdomen is soft. Tenderness: There is no abdominal tenderness. Genitourinary:     Rectum: Normal.   Musculoskeletal:         General: No deformity. Normal range of motion. Cervical back: Normal range of motion and neck supple. Skin:     General: Skin is warm. Findings: No rash. Neurological:      General: No focal deficit present. Mental Status: He is alert.       Gait: Gait normal.   Psychiatric:         Mood and Affect: Mood normal.         Vital Signs  ED Triage Vitals [10/02/23 1336]   Temperature Pulse Respirations Blood Pressure SpO2   98.5 °F (36.9 °C) 74 17 111/56 99 %      Temp Source Heart Rate Source Patient Position - Orthostatic VS BP Location FiO2 (%)   Oral Monitor Lying Left arm --      Pain Score       No Pain           Vitals:    10/02/23 1336 10/02/23 1630   BP: 111/56 158/72   Pulse: 74 79 Patient Position - Orthostatic VS: Lying Lying         Visual Acuity      ED Medications  Medications - No data to display    Diagnostic Studies  Results Reviewed       Procedure Component Value Units Date/Time    HS Troponin I 2hr [[id number]]  (Normal) Collected: 10/02/23 1636    Lab Status: Final result Specimen: Blood from Arm, Right Updated: 10/02/23 1710     hs TnI 2hr 10 ng/L      Delta 2hr hsTnI -1 ng/L     HS Troponin I 4hr [[id number]]     Lab Status: No result Specimen: Blood     HS Troponin 0hr (reflex protocol) [[id number]]  (Normal) Collected: 10/02/23 1348    Lab Status: Final result Specimen: Blood from Arm, Right Updated: 10/02/23 1415     hs TnI 0hr 11 ng/L     Comprehensive metabolic panel [[id number]] Collected: 10/02/23 1348    Lab Status: Final result Specimen: Blood from Arm, Right Updated: 10/02/23 1407     Sodium 138 mmol/L      Potassium 4.1 mmol/L      Chloride 105 mmol/L      CO2 27 mmol/L      ANION GAP 6 mmol/L      BUN 22 mg/dL      Creatinine 1.23 mg/dL      Glucose 117 mg/dL      Calcium 9.9 mg/dL      AST 14 U/L      ALT 13 U/L      Alkaline Phosphatase 86 U/L      Total Protein 7.0 g/dL      Albumin 4.1 g/dL      Total Bilirubin 0.40 mg/dL      eGFR 61 ml/min/1.73sq m     Narrative:      [facility] guidelines for Chronic Kidney Disease (CKD):     Stage 1 with normal or high GFR (GFR > 90 mL/min/1.73 square meters)    Stage 2 Mild CKD (GFR = 60-89 mL/min/1.73 square meters)    Stage 3A Moderate CKD (GFR = 45-59 mL/min/1.73 square meters)    Stage 3B Moderate CKD (GFR = 30-44 mL/min/1.73 square meters)    Stage 4 Severe CKD (GFR = 15-29 mL/min/1.73 square meters)    Stage 5 End Stage CKD (GFR <15 mL/min/1.73 square meters)  Note: GFR calculation is accurate only with a steady state creatinine    Protime-INR [[id number]]  (Normal) Collected: 10/02/23 1348    Lab Status: Final result Specimen: Blood from Arm, Right Updated: 10/02/23 1406     Protime 13.3 seconds INR 0.94    APTT [[id number]]  (Normal) Collected: 10/02/23 1348    Lab Status: Final result Specimen: Blood from Arm, Right Updated: 10/02/23 1406     PTT 28 seconds     CBC and differential [[id number]] Collected: 10/02/23 1348    Lab Status: Final result Specimen: Blood from Arm, Right Updated: 10/02/23 1353     WBC 8.29 Thousand/uL      RBC 4.20 Million/uL      Hemoglobin 13.0 g/dL      Hematocrit 40.3 %      MCV 96 fL      MCH 31.0 pg      MCHC 32.3 g/dL      RDW 12.6 %      MPV 10.0 fL      Platelets 623 Thousands/uL      nRBC 0 /100 WBCs      Neutrophils Relative 71 %      Immat GRANS % 1 %      Lymphocytes Relative 15 %      Monocytes Relative 10 %      Eosinophils Relative 2 %      Basophils Relative 1 %      Neutrophils Absolute 5.98 Thousands/µL      Immature Grans Absolute 0.04 Thousand/uL      Lymphocytes Absolute 1.22 Thousands/µL      Monocytes Absolute 0.81 Thousand/µL      Eosinophils Absolute 0.16 Thousand/µL      Basophils Absolute 0.08 Thousands/µL                    No orders to display              Procedures  Procedures         ED Course                               SBIRT 20yo+      Flowsheet Row Most Recent Value   Initial Alcohol Screen: US AUDIT-C     1. How often do you have a drink containing alcohol? 0 Filed at: 10/02/2023 1338   Audit-C Score 0 Filed at: 10/02/2023 1338   WILLIAM: How many times in the past year have you. .. Used an illegal drug or used a prescription medication for non-medical reasons? Never Filed at: 10/02/2023 1338                      Medical Decision Making  58 yom with episode of BRBPR. Obtain labs to assess for anemia, coagulopathy. Discussed all results with patient and spouse. No further bloody BM while in ED. FU GI/PCP. RTED discussed. Problems Addressed:  Hematochezia: acute illness or injury    Amount and/or Complexity of Data Reviewed  Independent Historian: EMS  External Data Reviewed: labs. Labs: ordered.   ECG/medicine tests: ordered and independent interpretation performed. Disposition  Final diagnoses:   Hematochezia     Time reflects when diagnosis was documented in both MDM as applicable and the Disposition within this note       Time User Action Codes Description Comment    10/2/2023  5:10 PM Shadia Garg Add [K92.1] Hematochezia           ED Disposition       ED Disposition   Discharge    Condition   Stable    Date/Time   Mon Oct 2, 2023 1734    [address] discharge to home/self care. Follow-up Information       Follow up With Specialties Details Why Contact Info Additional Dorys, Ezio [facility] Nurse Practitioner   [address]  [phone]       [facility], [facility]   [facility] [address] [address]  [address] [facility] [place] [facility]  [phone]     [address] [facility] Emergency Department Emergency Medicine  If symptoms worsen [address] [id number] [phone] [address] [facility] Emergency Department, [address] [facility] [place], [facility], [address]            Patient's Medications   Discharge Prescriptions    No medications on file       No discharge procedures on file.     PDMP Review         Value Time User    PDMP Reviewed  Yes 6/5/2023  9:51 PM Ashely Dalal MD            ED Provider  Electronically Signed by             Shadia Garg DO  10/02/23 1733

## 2023-10-03 ENCOUNTER — APPOINTMENT (EMERGENCY)
Dept: RADIOLOGY | Facility: HOSPITAL | Age: 63
DRG: 871 | End: 2023-10-03
Payer: COMMERCIAL

## 2023-10-03 ENCOUNTER — HOSPITAL ENCOUNTER (INPATIENT)
Facility: HOSPITAL | Age: 63
LOS: 4 days | Discharge: NURSING, REHAB OR LONG-TERM CARE | DRG: 871 | End: 2023-10-07
Attending: EMERGENCY MEDICINE | Admitting: STUDENT IN AN ORGANIZED HEALTH CARE EDUCATION/TRAINING PROGRAM
Payer: COMMERCIAL

## 2023-10-03 ENCOUNTER — APPOINTMENT (EMERGENCY)
Dept: CT IMAGING | Facility: HOSPITAL | Age: 63
DRG: 871 | End: 2023-10-03
Payer: COMMERCIAL

## 2023-10-03 DIAGNOSIS — R00.0 SINUS TACHYCARDIA: ICD-10-CM

## 2023-10-03 DIAGNOSIS — Z86.73 H/O: STROKE: ICD-10-CM

## 2023-10-03 DIAGNOSIS — U07.1 COVID-19: Primary | ICD-10-CM

## 2023-10-03 DIAGNOSIS — R09.02 HYPOXIA: ICD-10-CM

## 2023-10-03 PROBLEM — A41.89 SEPSIS DUE TO COVID-19 (HCC): Status: ACTIVE | Noted: 2023-10-03

## 2023-10-03 LAB
2HR DELTA HS TROPONIN: 4 NG/L
4HR DELTA HS TROPONIN: 10 NG/L
ALBUMIN SERPL BCP-MCNC: 4.1 G/DL (ref 3.5–5)
ALP SERPL-CCNC: 87 U/L (ref 34–104)
ALT SERPL W P-5'-P-CCNC: 14 U/L (ref 7–52)
ANION GAP SERPL CALCULATED.3IONS-SCNC: 6 MMOL/L
APTT PPP: 31 SECONDS (ref 23–37)
AST SERPL W P-5'-P-CCNC: 16 U/L (ref 13–39)
ATRIAL RATE: 116 BPM
ATRIAL RATE: 85 BPM
BACTERIA UR QL AUTO: ABNORMAL /HPF
BASOPHILS # BLD AUTO: 0.07 THOUSANDS/ÂΜL (ref 0–0.1)
BASOPHILS NFR BLD AUTO: 1 % (ref 0–1)
BILIRUB SERPL-MCNC: 0.45 MG/DL (ref 0.2–1)
BILIRUB UR QL STRIP: NEGATIVE
BUN SERPL-MCNC: 18 MG/DL (ref 5–25)
CALCIUM SERPL-MCNC: 9.7 MG/DL (ref 8.4–10.2)
CARDIAC TROPONIN I PNL SERPL HS: 16 NG/L
CARDIAC TROPONIN I PNL SERPL HS: 20 NG/L
CARDIAC TROPONIN I PNL SERPL HS: 26 NG/L
CHLORIDE SERPL-SCNC: 104 MMOL/L (ref 96–108)
CLARITY UR: CLEAR
CO2 SERPL-SCNC: 27 MMOL/L (ref 21–32)
COLOR UR: YELLOW
CREAT SERPL-MCNC: 1.15 MG/DL (ref 0.6–1.3)
D DIMER PPP FEU-MCNC: 1.43 UG/ML FEU
EOSINOPHIL # BLD AUTO: 0.08 THOUSAND/ÂΜL (ref 0–0.61)
EOSINOPHIL NFR BLD AUTO: 1 % (ref 0–6)
ERYTHROCYTE [DISTWIDTH] IN BLOOD BY AUTOMATED COUNT: 12.6 % (ref 11.6–15.1)
FERRITIN SERPL-MCNC: 21 NG/ML (ref 24–336)
FLUAV RNA RESP QL NAA+PROBE: NEGATIVE
FLUBV RNA RESP QL NAA+PROBE: NEGATIVE
GFR SERPL CREATININE-BSD FRML MDRD: 66 ML/MIN/1.73SQ M
GLUCOSE SERPL-MCNC: 113 MG/DL (ref 65–140)
GLUCOSE SERPL-MCNC: 90 MG/DL (ref 65–140)
GLUCOSE SERPL-MCNC: 91 MG/DL (ref 65–140)
GLUCOSE UR STRIP-MCNC: ABNORMAL MG/DL
HCT VFR BLD AUTO: 38.1 % (ref 36.5–49.3)
HGB BLD-MCNC: 12.6 G/DL (ref 12–17)
HGB UR QL STRIP.AUTO: NEGATIVE
IMM GRANULOCYTES # BLD AUTO: 0.05 THOUSAND/UL (ref 0–0.2)
IMM GRANULOCYTES NFR BLD AUTO: 1 % (ref 0–2)
INR PPP: 1.08 (ref 0.84–1.19)
KETONES UR STRIP-MCNC: ABNORMAL MG/DL
LACTATE SERPL-SCNC: 1.4 MMOL/L (ref 0.5–2)
LEUKOCYTE ESTERASE UR QL STRIP: NEGATIVE
LYMPHOCYTES # BLD AUTO: 0.36 THOUSANDS/ÂΜL (ref 0.6–4.47)
LYMPHOCYTES NFR BLD AUTO: 5 % (ref 14–44)
MCH RBC QN AUTO: 31.3 PG (ref 26.8–34.3)
MCHC RBC AUTO-ENTMCNC: 33.1 G/DL (ref 31.4–37.4)
MCV RBC AUTO: 95 FL (ref 82–98)
MONOCYTES # BLD AUTO: 1.02 THOUSAND/ÂΜL (ref 0.17–1.22)
MONOCYTES NFR BLD AUTO: 14 % (ref 4–12)
MUCOUS THREADS UR QL AUTO: ABNORMAL
NEUTROPHILS # BLD AUTO: 5.67 THOUSANDS/ÂΜL (ref 1.85–7.62)
NEUTS SEG NFR BLD AUTO: 78 % (ref 43–75)
NITRITE UR QL STRIP: NEGATIVE
NON-SQ EPI CELLS URNS QL MICRO: ABNORMAL /HPF
NRBC BLD AUTO-RTO: 0 /100 WBCS
P AXIS: 41 DEGREES
P AXIS: 44 DEGREES
PH UR STRIP.AUTO: 5.5 [PH]
PLATELET # BLD AUTO: 235 THOUSANDS/UL (ref 149–390)
PMV BLD AUTO: 10.3 FL (ref 8.9–12.7)
POTASSIUM SERPL-SCNC: 3.5 MMOL/L (ref 3.5–5.3)
PR INTERVAL: 134 MS
PR INTERVAL: 154 MS
PROCALCITONIN SERPL-MCNC: 0.07 NG/ML
PROT SERPL-MCNC: 7.4 G/DL (ref 6.4–8.4)
PROT UR STRIP-MCNC: ABNORMAL MG/DL
PROTHROMBIN TIME: 14.4 SECONDS (ref 11.6–14.5)
QRS AXIS: 37 DEGREES
QRS AXIS: 60 DEGREES
QRSD INTERVAL: 100 MS
QRSD INTERVAL: 108 MS
QT INTERVAL: 330 MS
QT INTERVAL: 410 MS
QTC INTERVAL: 458 MS
QTC INTERVAL: 487 MS
RBC # BLD AUTO: 4.03 MILLION/UL (ref 3.88–5.62)
RBC #/AREA URNS AUTO: ABNORMAL /HPF
RSV RNA RESP QL NAA+PROBE: NEGATIVE
SARS-COV-2 RNA RESP QL NAA+PROBE: POSITIVE
SODIUM SERPL-SCNC: 137 MMOL/L (ref 135–147)
SP GR UR STRIP.AUTO: 1.02 (ref 1–1.03)
T WAVE AXIS: -6 DEGREES
T WAVE AXIS: 71 DEGREES
TRIGL SERPL-MCNC: 89 MG/DL
UROBILINOGEN UR STRIP-ACNC: <2 MG/DL
VENTRICULAR RATE: 116 BPM
VENTRICULAR RATE: 85 BPM
WBC # BLD AUTO: 7.25 THOUSAND/UL (ref 4.31–10.16)
WBC #/AREA URNS AUTO: ABNORMAL /HPF

## 2023-10-03 PROCEDURE — 81001 URINALYSIS AUTO W/SCOPE: CPT

## 2023-10-03 PROCEDURE — 96366 THER/PROPH/DIAG IV INF ADDON: CPT

## 2023-10-03 PROCEDURE — 82948 REAGENT STRIP/BLOOD GLUCOSE: CPT

## 2023-10-03 PROCEDURE — 71045 X-RAY EXAM CHEST 1 VIEW: CPT

## 2023-10-03 PROCEDURE — 93010 ELECTROCARDIOGRAM REPORT: CPT | Performed by: INTERNAL MEDICINE

## 2023-10-03 PROCEDURE — 71275 CT ANGIOGRAPHY CHEST: CPT

## 2023-10-03 PROCEDURE — 85730 THROMBOPLASTIN TIME PARTIAL: CPT

## 2023-10-03 PROCEDURE — 99223 1ST HOSP IP/OBS HIGH 75: CPT | Performed by: STUDENT IN AN ORGANIZED HEALTH CARE EDUCATION/TRAINING PROGRAM

## 2023-10-03 PROCEDURE — 93005 ELECTROCARDIOGRAM TRACING: CPT

## 2023-10-03 PROCEDURE — 94760 N-INVAS EAR/PLS OXIMETRY 1: CPT

## 2023-10-03 PROCEDURE — 0241U HB NFCT DS VIR RESP RNA 4 TRGT: CPT

## 2023-10-03 PROCEDURE — 96365 THER/PROPH/DIAG IV INF INIT: CPT

## 2023-10-03 PROCEDURE — 85025 COMPLETE CBC W/AUTO DIFF WBC: CPT

## 2023-10-03 PROCEDURE — 83605 ASSAY OF LACTIC ACID: CPT

## 2023-10-03 PROCEDURE — 99291 CRITICAL CARE FIRST HOUR: CPT | Performed by: EMERGENCY MEDICINE

## 2023-10-03 PROCEDURE — 84484 ASSAY OF TROPONIN QUANT: CPT

## 2023-10-03 PROCEDURE — 85610 PROTHROMBIN TIME: CPT

## 2023-10-03 PROCEDURE — 80053 COMPREHEN METABOLIC PANEL: CPT

## 2023-10-03 PROCEDURE — 82728 ASSAY OF FERRITIN: CPT | Performed by: EMERGENCY MEDICINE

## 2023-10-03 PROCEDURE — 99285 EMERGENCY DEPT VISIT HI MDM: CPT

## 2023-10-03 PROCEDURE — 87040 BLOOD CULTURE FOR BACTERIA: CPT

## 2023-10-03 PROCEDURE — G1004 CDSM NDSC: HCPCS

## 2023-10-03 PROCEDURE — 94762 N-INVAS EAR/PLS OXIMTRY CONT: CPT

## 2023-10-03 PROCEDURE — 36415 COLL VENOUS BLD VENIPUNCTURE: CPT

## 2023-10-03 PROCEDURE — 84478 ASSAY OF TRIGLYCERIDES: CPT | Performed by: EMERGENCY MEDICINE

## 2023-10-03 PROCEDURE — 85379 FIBRIN DEGRADATION QUANT: CPT | Performed by: EMERGENCY MEDICINE

## 2023-10-03 PROCEDURE — 84145 PROCALCITONIN (PCT): CPT

## 2023-10-03 RX ORDER — ACETAMINOPHEN 325 MG/1
650 TABLET ORAL ONCE
Status: COMPLETED | OUTPATIENT
Start: 2023-10-03 | End: 2023-10-03

## 2023-10-03 RX ORDER — DEXAMETHASONE SODIUM PHOSPHATE 10 MG/ML
6 INJECTION, SOLUTION INTRAMUSCULAR; INTRAVENOUS ONCE
Status: DISCONTINUED | OUTPATIENT
Start: 2023-10-03 | End: 2023-10-03

## 2023-10-03 RX ORDER — DONEPEZIL HYDROCHLORIDE 5 MG/1
10 TABLET, FILM COATED ORAL DAILY
Status: DISCONTINUED | OUTPATIENT
Start: 2023-10-03 | End: 2023-10-07 | Stop reason: HOSPADM

## 2023-10-03 RX ORDER — INSULIN LISPRO 100 [IU]/ML
1-5 INJECTION, SOLUTION INTRAVENOUS; SUBCUTANEOUS
Status: DISCONTINUED | OUTPATIENT
Start: 2023-10-03 | End: 2023-10-07 | Stop reason: HOSPADM

## 2023-10-03 RX ORDER — TAMSULOSIN HYDROCHLORIDE 0.4 MG/1
0.4 CAPSULE ORAL
Status: DISCONTINUED | OUTPATIENT
Start: 2023-10-03 | End: 2023-10-07 | Stop reason: HOSPADM

## 2023-10-03 RX ORDER — LANOLIN ALCOHOL/MO/W.PET/CERES
6 CREAM (GRAM) TOPICAL
Status: DISCONTINUED | OUTPATIENT
Start: 2023-10-03 | End: 2023-10-07 | Stop reason: HOSPADM

## 2023-10-03 RX ORDER — PANTOPRAZOLE SODIUM 40 MG/1
40 TABLET, DELAYED RELEASE ORAL DAILY
Status: DISCONTINUED | OUTPATIENT
Start: 2023-10-03 | End: 2023-10-07 | Stop reason: HOSPADM

## 2023-10-03 RX ORDER — SODIUM CHLORIDE, SODIUM GLUCONATE, SODIUM ACETATE, POTASSIUM CHLORIDE, MAGNESIUM CHLORIDE, SODIUM PHOSPHATE, DIBASIC, AND POTASSIUM PHOSPHATE .53; .5; .37; .037; .03; .012; .00082 G/100ML; G/100ML; G/100ML; G/100ML; G/100ML; G/100ML; G/100ML
1000 INJECTION, SOLUTION INTRAVENOUS ONCE
Status: COMPLETED | OUTPATIENT
Start: 2023-10-03 | End: 2023-10-03

## 2023-10-03 RX ORDER — LEVETIRACETAM 500 MG/1
500 TABLET ORAL EVERY 12 HOURS SCHEDULED
Status: DISCONTINUED | OUTPATIENT
Start: 2023-10-03 | End: 2023-10-07 | Stop reason: HOSPADM

## 2023-10-03 RX ORDER — BACLOFEN 10 MG/1
5 TABLET ORAL 2 TIMES DAILY PRN
Status: DISCONTINUED | OUTPATIENT
Start: 2023-10-03 | End: 2023-10-07 | Stop reason: HOSPADM

## 2023-10-03 RX ORDER — ATORVASTATIN CALCIUM 40 MG/1
40 TABLET, FILM COATED ORAL
Status: DISCONTINUED | OUTPATIENT
Start: 2023-10-04 | End: 2023-10-07 | Stop reason: HOSPADM

## 2023-10-03 RX ORDER — GUAIFENESIN 600 MG/1
600 TABLET, EXTENDED RELEASE ORAL EVERY 12 HOURS SCHEDULED
Status: DISCONTINUED | OUTPATIENT
Start: 2023-10-03 | End: 2023-10-07 | Stop reason: HOSPADM

## 2023-10-03 RX ORDER — INSULIN LISPRO 100 [IU]/ML
1-6 INJECTION, SOLUTION INTRAVENOUS; SUBCUTANEOUS
Status: DISCONTINUED | OUTPATIENT
Start: 2023-10-03 | End: 2023-10-07 | Stop reason: HOSPADM

## 2023-10-03 RX ORDER — LOSARTAN POTASSIUM 50 MG/1
50 TABLET ORAL DAILY
Status: DISCONTINUED | OUTPATIENT
Start: 2023-10-03 | End: 2023-10-07 | Stop reason: HOSPADM

## 2023-10-03 RX ORDER — ACETAMINOPHEN 325 MG/1
650 TABLET ORAL EVERY 6 HOURS PRN
Status: DISCONTINUED | OUTPATIENT
Start: 2023-10-03 | End: 2023-10-04

## 2023-10-03 RX ORDER — CILOSTAZOL 50 MG/1
100 TABLET ORAL
Status: DISCONTINUED | OUTPATIENT
Start: 2023-10-03 | End: 2023-10-07 | Stop reason: HOSPADM

## 2023-10-03 RX ORDER — ENOXAPARIN SODIUM 100 MG/ML
30 INJECTION SUBCUTANEOUS EVERY 12 HOURS SCHEDULED
Status: DISCONTINUED | OUTPATIENT
Start: 2023-10-03 | End: 2023-10-07 | Stop reason: HOSPADM

## 2023-10-03 RX ORDER — CITALOPRAM 20 MG/1
20 TABLET ORAL DAILY
Status: DISCONTINUED | OUTPATIENT
Start: 2023-10-04 | End: 2023-10-03

## 2023-10-03 RX ORDER — METOPROLOL SUCCINATE 50 MG/1
100 TABLET, EXTENDED RELEASE ORAL DAILY
Status: DISCONTINUED | OUTPATIENT
Start: 2023-10-03 | End: 2023-10-07 | Stop reason: HOSPADM

## 2023-10-03 RX ADMIN — METOPROLOL SUCCINATE 100 MG: 50 TABLET, EXTENDED RELEASE ORAL at 16:53

## 2023-10-03 RX ADMIN — ENOXAPARIN SODIUM 30 MG: 100 INJECTION SUBCUTANEOUS at 20:52

## 2023-10-03 RX ADMIN — GUAIFENESIN 600 MG: 600 TABLET ORAL at 14:50

## 2023-10-03 RX ADMIN — Medication 6 MG: at 20:52

## 2023-10-03 RX ADMIN — IOHEXOL 85 ML: 350 INJECTION, SOLUTION INTRAVENOUS at 12:47

## 2023-10-03 RX ADMIN — DONEPEZIL HYDROCHLORIDE 10 MG: 5 TABLET, FILM COATED ORAL at 16:54

## 2023-10-03 RX ADMIN — ASPIRIN 81 MG: 81 TABLET, COATED ORAL at 16:54

## 2023-10-03 RX ADMIN — REMDESIVIR 200 MG: 100 INJECTION, POWDER, LYOPHILIZED, FOR SOLUTION INTRAVENOUS at 14:49

## 2023-10-03 RX ADMIN — TICAGRELOR 90 MG: 90 TABLET ORAL at 16:54

## 2023-10-03 RX ADMIN — GUAIFENESIN 600 MG: 600 TABLET ORAL at 20:52

## 2023-10-03 RX ADMIN — LOSARTAN POTASSIUM 50 MG: 50 TABLET, FILM COATED ORAL at 16:54

## 2023-10-03 RX ADMIN — ACETAMINOPHEN 650 MG: 325 TABLET, FILM COATED ORAL at 18:26

## 2023-10-03 RX ADMIN — PANTOPRAZOLE SODIUM 40 MG: 40 TABLET, DELAYED RELEASE ORAL at 16:54

## 2023-10-03 RX ADMIN — LEVETIRACETAM 500 MG: 500 TABLET, FILM COATED ORAL at 20:52

## 2023-10-03 RX ADMIN — SODIUM CHLORIDE, SODIUM GLUCONATE, SODIUM ACETATE, POTASSIUM CHLORIDE, MAGNESIUM CHLORIDE, SODIUM PHOSPHATE, DIBASIC, AND POTASSIUM PHOSPHATE 1000 ML: .53; .5; .37; .037; .03; .012; .00082 INJECTION, SOLUTION INTRAVENOUS at 07:50

## 2023-10-03 RX ADMIN — TAMSULOSIN HYDROCHLORIDE 0.4 MG: 0.4 CAPSULE ORAL at 16:54

## 2023-10-03 RX ADMIN — CILOSTAZOL 100 MG: 50 TABLET ORAL at 16:54

## 2023-10-03 RX ADMIN — ACETAMINOPHEN 650 MG: 325 TABLET, FILM COATED ORAL at 09:53

## 2023-10-03 NOTE — ASSESSMENT & PLAN NOTE
status post left carotid stent, right carotid stenosis, had a loop recorder placed for 3 years which did not show any evidence of A-fib   Continue home ASA, brilinta, statin   Mild aphasia at baseline   Follows with neurology outpatient   Currently on keppra 500 mg BID due to "zoning out spells" which are possible but not confirmed seizures. Awaiting outpatient MRI brain.    Seizure precautions

## 2023-10-03 NOTE — PLAN OF CARE
Problem: Potential for Falls  Goal: Patient will remain free of falls  Description: INTERVENTIONS:  - Educate patient/family on patient safety including physical limitations  - Instruct patient to call for assistance with activity   - Consult OT/PT to assist with strengthening/mobility   - Keep Call bell within reach  - Keep bed low and locked with side rails adjusted as appropriate  - Keep care items and personal belongings within reach  - Initiate and maintain comfort rounds  - Make Fall Risk Sign visible to staff  - Offer Toileting every 2  Problem: INFECTION - ADULT  Goal: Absence or prevention of progression during hospitalization  Description: INTERVENTIONS:  - Assess and monitor for signs and symptoms of infection  - Monitor lab/diagnostic results  - Monitor all insertion sites, i.e. indwelling lines, tubes, and drains  - Monitor endotracheal if appropriate and nasal secretions for changes in amount and color  - [place] appropriate cooling/warming therapies per order  - Administer medications as ordered  - Instruct and encourage patient and family to use good hand hygiene technique  - Identify and instruct in appropriate isolation precautions for identified infection/condition  Outcome: Progressing  Goal: Absence of fever/infection during neutropenic period  Description: INTERVENTIONS:  - Monitor WBC    Outcome: Progressing    Hours, in advance of need  - Initiate/Maintain alarm alarm  - Obtain necessary fall risk management equipment:   - Apply yellow socks and bracelet for high fall risk patients  - Consider moving patient to room near nurses station  10/3/2023 1725 by Francisco Malik RN  Outcome: Progressing  10/3/2023 1724 by Francisco Malik, RN  Outcome: Progressing

## 2023-10-03 NOTE — ED NOTES
Pt aware of need for urine sample, aware provider would like straight cath.  Pt states "the hell you will" pt requesting to wait a few min and let him try the urinal first. Pts pants and underwear removed and urinal placed on side of bed and pt covered with [id number] Alicia COREAS RN  10/03/23 7256

## 2023-10-03 NOTE — ED PROVIDER NOTES
History  Chief Complaint   Patient presents with    Fever     Was seen here yesterday, wife states he was incont of urine this AM and weak getting out of bed. Had a fever and a cough RT=807 per EMS     30-year-old male presents for EMS for the evaluation of fevers chills and urinary incontinence beginning this morning. EMS states that the patient's wife noted the patient was weaker than usual.  He was seen here yesterday for bloody stools. The patient states that has resolved. The patient denies any pain. He just states that he is fatigued and weak. Prior to Admission Medications   Prescriptions Last Dose Informant Patient Reported? Taking?   acetaminophen (TYLENOL) 325 mg tablet  Spouse/Significant Other No No   Sig: Take 3 tablets (975 mg total) by mouth 3 (three) times a day as needed for mild pain, headaches or fever   aspirin (ECOTRIN LOW STRENGTH) 81 mg EC tablet  Spouse/Significant Other No No   Sig: Take 1 tablet (81 mg total) by mouth daily Do not start before April 19, 2023. atorvastatin (LIPITOR) 40 mg tablet  Spouse/Significant Other Yes No   Sig: Take 40 mg by mouth daily with breakfast   baclofen 5 MG TABS  Spouse/Significant Other No No   Sig: Take 5 mg by mouth 2 (two) times a day as needed for muscle spasms   cilostazol (PLETAL) 100 mg tablet  Spouse/Significant Other No No   Sig: Take 1 tablet (100 mg total) by mouth 2 (two) times a day before meals Do not start before June 6, 2023. citalopram (CeleXA) 20 mg tablet  Spouse/Significant Other Yes No   Sig: Take 20 mg by mouth daily   donepezil (ARICEPT) 10 mg tablet  Spouse/Significant Other No No   Sig: Take 1 tablet (10 mg total) by mouth in the morning   levETIRAcetam (Keppra) 500 mg tablet   No No   Sig: Take 1 tab (500 mg) twice a day for 1 week, then take 2 tabs (1000 mg) twice a day.    lidocaine (Lidoderm) 5 %  Spouse/Significant Other No No   Sig: Apply 1 patch topically over 12 hours daily Remove & Discard patch within 12 hours or as directed by MD   losartan (COZAAR) 50 mg tablet  Spouse/Significant Other Yes No   Sig: Take 50 mg by mouth daily   metFORMIN (GLUCOPHAGE) 500 mg tablet  Spouse/Significant Other Yes No   Sig: Take 500 mg by mouth daily with breakfast   metoprolol succinate (TOPROL-XL) 100 mg 24 hr tablet  Spouse/Significant Other Yes No   Sig: Take 100 mg by mouth daily   pantoprazole (PROTONIX) 40 mg tablet  Spouse/Significant Other No No   Sig: Take 1 tablet (40 mg total) by mouth daily   tamsulosin (FLOMAX) 0.4 mg  Spouse/Significant Other No No   Sig: Take 1 capsule (0.4 mg total) by mouth daily with dinner   ticagrelor (BRILINTA) 90 MG  Spouse/Significant Other No No   Sig: Take 1 tablet (90 mg total) by mouth every 12 (twelve) hours   traZODone (DESYREL) 100 mg tablet  Spouse/Significant Other Yes No   Sig: Take 100 mg by mouth daily at bedtime      Facility-Administered Medications: None       Past Medical History:   Diagnosis Date    Depression     Diabetes mellitus ([address] [facility])     patient denies    Dyslipidemia 03/26/2019    GERD (gastroesophageal reflux disease)     Hyperlipidemia     Hypertension     Prediabetes     Stroke [facility])        Past Surgical History:   Procedure Laterality Date    ARTHROSCOPIC REPAIR ACL Left     BACK SURGERY      twice    CARPAL TUNNEL RELEASE Right     IR CEREBRAL ANGIOGRAPHY  05/04/2023    IR STROKE ALERT  03/19/2019    SHOULDER SURGERY Left        Family History   Problem Relation Age of Onset    Hyperlipidemia Mother     Hypertension Mother     Diabetes unspecified Mother         prediabetes    Heart attack Mother     Diabetes Mother     Hyperlipidemia Father     Hypertension Father     Prostate cancer Father     Stroke Father     Hyperlipidemia Sister     Hypertension Sister     Hyperlipidemia Sister     Hypertension Sister     Depression Sister     Hypertension Sister     Hyperlipidemia Sister     Depression Sister     Hyperlipidemia Brother     Hypertension Brother     Hypertension Brother     Prostate cancer Brother     Hypothyroidism Daughter     Hypothyroidism Son     Diabetes unspecified Son     Seizures Neg Hx      I have reviewed and agree with the history as documented. E-Cigarette/Vaping    E-Cigarette Use Never User      E-Cigarette/Vaping Substances    Nicotine No     THC No     CBD No     Flavoring No     Other No     Unknown No      Social History     Tobacco Use    Smoking status: Former    Smokeless tobacco: Never   Vaping Use    Vaping Use: Never used   Substance Use Topics    Alcohol use: Never    Drug use: Never       Review of Systems    Physical Exam  Physical Exam  Vitals and nursing note reviewed. Constitutional:       Appearance: He is well-developed. He is ill-appearing. HENT:      Head: Normocephalic and atraumatic. Right Ear: External ear normal.      Left Ear: External ear normal.      Mouth/Throat:      Pharynx: No oropharyngeal exudate. Eyes:      General: No scleral icterus. Pupils: Pupils are equal, round, and reactive to light. Cardiovascular:      Rate and Rhythm: Regular rhythm. Tachycardia present. Heart sounds: Normal heart sounds. Pulmonary:      Effort: Pulmonary effort is normal. No respiratory distress. Breath sounds: Normal breath sounds. Abdominal:      General: Bowel sounds are normal.      Palpations: Abdomen is soft. Tenderness: There is no abdominal tenderness. There is no guarding or rebound. Musculoskeletal:         General: Normal range of motion. Cervical back: Normal range of motion and neck supple. Skin:     General: Skin is warm and dry. Findings: No rash. Neurological:      Mental Status: He is alert and oriented to person, place, and time. He is confused. GCS: GCS eye subscore is 4. GCS verbal subscore is 5. GCS motor subscore is 6.          Vital Signs  ED Triage Vitals [10/03/23 0731]   Temperature Pulse Respirations Blood Pressure SpO2   100.5 °F (38.1 °C) (!) 120 20 118/67 97 % Temp Source Heart Rate Source Patient Position - Orthostatic VS BP Location FiO2 (%)   Oral Monitor Sitting Left arm --      Pain Score       No Pain           Vitals:    10/03/23 1330 10/03/23 1415 10/03/23 1430 10/03/23 1500   BP: 156/72  (!) 171/81 155/78   Pulse: 101 (!) 108 (!) 109 (!) 111   Patient Position - Orthostatic VS: Sitting  Sitting Sitting         Visual Acuity      ED Medications  Medications   remdesivir (Veklury) 200 mg in sodium chloride 0.9 % 290 mL IVPB (200 mg Intravenous New Bag 10/3/23 1449)     Followed by   remdesivir Johnson Geoff) 100 mg in sodium chloride 0.9 % 270 mL IVPB (has no administration in time range)   insulin lispro (HumaLOG) 100 units/mL subcutaneous injection 1-6 Units (has no administration in time range)   insulin lispro (HumaLOG) 100 units/mL subcutaneous injection 1-5 Units (has no administration in time range)   guaiFENesin (MUCINEX) 12 hr tablet 600 mg (600 mg Oral Given 10/3/23 1450)   multi-electrolyte (ISOLYTE-S PH 7.4) bolus 1,000 mL (0 mL Intravenous Stopped 10/3/23 0935)   acetaminophen (TYLENOL) tablet 650 mg (650 mg Oral Given 10/3/23 0953)   iohexol (OMNIPAQUE) 350 MG/ML injection (MULTI-DOSE) 85 mL (85 mL Intravenous Given 10/3/23 1247)       Diagnostic Studies  Results Reviewed       Procedure Component Value Units Date/Time    Blood culture #1 [[id number]] Collected: 10/03/23 0739    Lab Status: Preliminary result Specimen: Blood from Arm, Left Updated: 10/03/23 1502     Blood Culture Received in Microbiology Lab. Culture in Progress. Blood culture #2 [[id number]] Collected: 10/03/23 0739    Lab Status: Preliminary result Specimen: Blood from Arm, Right Updated: 10/03/23 1502     Blood Culture Received in Microbiology Lab. Culture in Progress.     HS Troponin I 4hr [[id number]]  (Normal) Collected: 10/03/23 1208    Lab Status: Final result Specimen: Blood from Arm, Right Updated: 10/03/23 1235     hs TnI 4hr 26 ng/L      Delta 4hr hsTnI 10 ng/L     Urine Microscopic [[id number]]  (Abnormal) Collected: 10/03/23 1045    Lab Status: Final result Specimen: Urine, Straight Cath Updated: 10/03/23 1144     RBC, UA 0-1 /hpf      WBC, UA 0-1 /hpf      Epithelial Cells Occasional /hpf      Bacteria, UA Occasional /hpf      MUCUS THREADS Occasional    UA w Reflex to Microscopic w Reflex to Culture [[id number]]  (Abnormal) Collected: 10/03/23 1045    Lab Status: Final result Specimen: Urine, Straight Cath Updated: 10/03/23 1144     Color, UA Yellow     Clarity, UA Clear     Specific Gravity, UA 1.025     pH, UA 5.5     Leukocytes, UA Negative     Nitrite, UA Negative     Protein, UA 30 (1+) mg/dl      Glucose, UA 70 (7/100%) mg/dl      Ketones, UA 10 (1+) mg/dl      Urobilinogen, UA <2.0 mg/dl      Bilirubin, UA Negative     Occult Blood, UA Negative    D-dimer, quantitative [[id number]]  (Abnormal) Collected: 10/03/23 0739    Lab Status: Final result Specimen: Blood from Arm, Left Updated: 10/03/23 1022     D-Dimer, Quant 1.43 ug/ml FEU     Narrative: In the evaluation for possible pulmonary embolism, in the appropriate (Well's Score of 4 or less) patient, the age adjusted d-dimer cutoff for this patient can be calculated as:    Age x 0.01 (in ug/mL) for Age-adjusted D-dimer exclusion threshold for a patient over 50 years. Triglycerides [[id number]]  (Normal) Collected: 10/03/23 0739    Lab Status: Final result Specimen: Blood from Arm, Left Updated: 10/03/23 1020     Triglycerides 89 mg/dL     Ferritin [[id number]] Collected: 10/03/23 0739    Lab Status:  In process Specimen: Blood Updated: 10/03/23 1010    HS Troponin I 2hr [[id number]]  (Normal) Collected: 10/03/23 0936    Lab Status: Final result Specimen: Blood from Arm, Right Updated: 10/03/23 1004     hs TnI 2hr 20 ng/L      Delta 2hr hsTnI 4 ng/L     COVID/FLU/RSV [[id number]]  (Abnormal) Collected: 10/03/23 0744    Lab Status: Final result Specimen: Nares from Nose Updated: 10/03/23 0830     SARS-CoV-2 Positive INFLUENZA A PCR Negative     INFLUENZA B PCR Negative     RSV PCR Negative    Narrative:      FOR PEDIATRIC PATIENTS - copy/paste COVID Guidelines URL to browser: [email or web address]    SARS-CoV-2 assay is a Nucleic Acid Amplification assay intended for the  qualitative detection of nucleic acid from SARS-CoV-2 in nasopharyngeal  swabs. Results are for the presumptive identification of SARS-CoV-2 RNA. Positive results are indicative of infection with SARS-CoV-2, the virus  causing COVID-19, but do not rule out bacterial infection or co-infection  with other viruses. Laboratories within the [facility] and its  territories are required to report all positive results to the appropriate  public health authorities. Negative results do not preclude SARS-CoV-2  infection and should not be used as the sole basis for treatment or other  patient management decisions. Negative results must be combined with  clinical observations, patient history, and epidemiological information. This test has not been FDA cleared or approved. This test has been authorized by FDA under an Emergency Use Authorization  (EUA). This test is only authorized for the duration of time the  declaration that circumstances exist justifying the authorization of the  emergency use of an in vitro diagnostic tests for detection of SARS-CoV-2  virus and/or diagnosis of COVID-19 infection under section 564(b)(1) of  the Act, 21 U. S.C. 116FIE-1(M)(3), unless the authorization is terminated  or revoked sooner. The test has been validated but independent review by FDA  and CLIA is pending. Test performed using [employer] [employer]: This RT-PCR assay targets N2,  a region unique to SARS-CoV-2. A conserved region in the E-gene was chosen  for pan-Sarbecovirus detection which includes SARS-CoV-2. According to CMS-2020-01-R, this platform meets the definition of high-throughput technology. Procalcitonin [[id number]]  (Normal) Collected: 10/03/23 0739    Lab Status: Final result Specimen: Blood from Arm, Left Updated: 10/03/23 0817     Procalcitonin 0.07 ng/ml     HS Troponin 0hr (reflex protocol) [[id number]]  (Normal) Collected: 10/03/23 0739    Lab Status: Final result Specimen: Blood from Arm, Left Updated: 10/03/23 0814     hs TnI 0hr 16 ng/L     Comprehensive metabolic panel [[id number]] Collected: 10/03/23 0739    Lab Status: Final result Specimen: Blood from Arm, Left Updated: 10/03/23 0810     Sodium 137 mmol/L      Potassium 3.5 mmol/L      Chloride 104 mmol/L      CO2 27 mmol/L      ANION GAP 6 mmol/L      BUN 18 mg/dL      Creatinine 1.15 mg/dL      Glucose 113 mg/dL      Calcium 9.7 mg/dL      AST 16 U/L      ALT 14 U/L      Alkaline Phosphatase 87 U/L      Total Protein 7.4 g/dL      Albumin 4.1 g/dL      Total Bilirubin 0.45 mg/dL      eGFR 66 ml/min/1.73sq m     Narrative:      [place] guidelines for Chronic Kidney Disease (CKD):     Stage 1 with normal or high GFR (GFR > 90 mL/min/1.73 square meters)    Stage 2 Mild CKD (GFR = 60-89 mL/min/1.73 square meters)    Stage 3A Moderate CKD (GFR = 45-59 mL/min/1.73 square meters)    Stage 3B Moderate CKD (GFR = 30-44 mL/min/1.73 square meters)    Stage 4 Severe CKD (GFR = 15-29 mL/min/1.73 square meters)    Stage 5 End Stage CKD (GFR <15 mL/min/1.73 square meters)  Note: GFR calculation is accurate only with a steady state creatinine    Lactic acid, plasma (w/reflex if result > 2.0) [[id number]]  (Normal) Collected: 10/03/23 0739    Lab Status: Final result Specimen: Blood from Arm, Left Updated: 10/03/23 0809     LACTIC ACID 1.4 mmol/L     Narrative:      Result may be elevated if tourniquet was used during collection.     APTT [[id number]]  (Normal) Collected: 10/03/23 0739    Lab Status: Final result Specimen: Blood from Arm, Left Updated: 10/03/23 0804     PTT 31 seconds     Protime-INR [[id number]]  (Normal) Collected: 10/03/23 0739    Lab Status: Final result Specimen: Blood from Arm, Left Updated: 10/03/23 0804     Protime 14.4 seconds      INR 1.08    CBC and differential [[id number]]  (Abnormal) Collected: 10/03/23 0739    Lab Status: Final result Specimen: Blood from Arm, Left Updated: 10/03/23 0749     WBC 7.25 Thousand/uL      RBC 4.03 Million/uL      Hemoglobin 12.6 g/dL      Hematocrit 38.1 %      MCV 95 fL      MCH 31.3 pg      MCHC 33.1 g/dL      RDW 12.6 %      MPV 10.3 fL      Platelets 558 Thousands/uL      nRBC 0 /100 WBCs      Neutrophils Relative 78 %      Immat GRANS % 1 %      Lymphocytes Relative 5 %      Monocytes Relative 14 %      Eosinophils Relative 1 %      Basophils Relative 1 %      Neutrophils Absolute 5.67 Thousands/µL      Immature Grans Absolute 0.05 Thousand/uL      Lymphocytes Absolute 0.36 Thousands/µL      Monocytes Absolute 1.02 Thousand/µL      Eosinophils Absolute 0.08 Thousand/µL      Basophils Absolute 0.07 Thousands/µL                    CTA ED chest PE study   Final Result by Leana Hardin MD (10/03 1321)      No definite acute pulmonary emboli but sensitivity is decreased due to mild motion artifact. Evaluation of the lungs mildly compromised by motion with no acute disease. Workstation performed: [id number]         XR chest 1 view portable   ED Interpretation by Ladan Monson DO (10/03 7200)   No acute cardiopulmonary process. Appears unchanged when compared to previous. Loop recorder noted in anterior chest      Final Result by Leana Hardin MD (10/03 9633)      No acute cardiopulmonary disease. Workstation performed: [id number]                    Procedures  ECG 12 Lead Documentation Only    Date/Time: 10/3/2023 7:45 AM    Performed by: Ladan Monson DO  Authorized by:  Ladan Monson DO    Indications / Diagnosis:  Tachycardia  ECG reviewed by me, the ED Provider: yes    Patient location:  ED  Previous ECG: Previous ECG:  Compared to current    Comparison ECG info:  No new acute ischemic changes when compared to previous EKG from October 2. Similarity:  No change  Interpretation:     Interpretation: normal    Rate:     ECG rate:  116    ECG rate assessment: tachycardic    Rhythm:     Rhythm: sinus rhythm    Ectopy:     Ectopy: none    QRS:     QRS axis:  Normal    QRS intervals:  Normal  Conduction:     Conduction: normal    ST segments:     ST segments:  Non-specific    Depression:  V6, V5 and V4  T waves:     T waves: normal    CriticalCare Time    Date/Time: 10/3/2023 12:19 PM    Performed by: Alejandra Pastor DO  Authorized by:  Alejandra Pastor DO    Critical care provider statement:     Critical care time (minutes):  35    Critical care time was exclusive of:  Separately billable procedures and treating other patients and teaching time    Critical care was necessary to treat or prevent imminent or life-threatening deterioration of the following conditions:  Respiratory failure    Critical care was time spent personally by me on the following activities:  Blood draw for specimens, obtaining history from patient or surrogate, development of treatment plan with patient or surrogate, discussions with consultants, evaluation of patient's response to treatment, examination of patient, ordering and performing treatments and interventions, ordering and review of laboratory studies, ordering and review of radiographic studies, re-evaluation of patient's condition, review of old charts and interpretation of cardiac output measurements    I assumed direction of critical care for this patient from another provider in my specialty: no             ED Course  ED Course as of 10/03/23 1524   Tue Oct 03, 2023   [facility] [facility] paged for admit   1212 Dr. Royal Reinoso requesting final on CTA prior to accepting to floor   1325 No PE on CTA, SLIM paged for bridging orders                               SBIRT 20yo+      Flowsheet Row Most Recent Value   Initial Alcohol Screen: US AUDIT-C     1. How often do you have a drink containing alcohol? 0 Filed at: 10/03/2023 1100   2. How many drinks containing alcohol do you have on a typical day you are drinking? 0 Filed at: 10/03/2023 1100   3a. Male UNDER 65: How often do you have five or more drinks on one occasion? 0 Filed at: 10/03/2023 1100   3b. FEMALE Any Age, or MALE 65+: How often do you have 4 or more drinks on one occassion? 0 Filed at: 10/03/2023 1100   Audit-C Score 0 Filed at: 10/03/2023 1100   WILLIAM: How many times in the past year have you. .. Used an illegal drug or used a prescription medication for non-medical reasons? Never Filed at: 10/03/2023 1100                      Medical Decision Making  Differential diagnosis: Sepsis, urinary tract infection, dehydration, COVID, flu, RSV, pneumonia    Plan for chest x-ray, laboratories, supplemental oxygen    Patient positive for COVID with new oxygen requirement therefore will be given IV steroids and remdesivir plan for admission      Amount and/or Complexity of Data Reviewed  External Data Reviewed: notes. Details: Echo from August noted with EF of 60%  Labs: ordered. Radiology: ordered and independent interpretation performed. Risk  OTC drugs. Prescription drug management. Decision regarding hospitalization.         Disposition  Final diagnoses:   COVID-19   Hypoxia   Sinus tachycardia   H/O: stroke     Time reflects when diagnosis was documented in both MDM as applicable and the Disposition within this note       Time User Action Codes Description Comment    10/3/2023  8:31 AM Fauzia David Add [U07.1] [id number]     10/3/2023 12:18 PM Fauzia David Add [R09.02] Hypoxia     10/3/2023  3:23 PM Fauzia David Add [R00.0] Sinus tachycardia     10/3/2023  3:23 PM Fauzia David Add [Z86.73] H/O: stroke           ED Disposition       ED Disposition   Admit    Condition   Stable    Date/Time   Tue Oct 3, 2023 1403    Comment Case was discussed with Dr. Telma Fair and the patient's admission status was agreed to be Admission Status: inpatient status to the service of Dr. Telma Fair . Follow-up Information    None         Patient's Medications   Discharge Prescriptions    No medications on file       No discharge procedures on file.     PDMP Review         Value Time User    PDMP Reviewed  Yes 6/5/2023  9:51 PM Jadon Dangelo MD            ED Provider  Electronically Signed by             Katarina Paredes DO  10/03/23 1523

## 2023-10-03 NOTE — ASSESSMENT & PLAN NOTE
Presented to ED for cough, chest pain, SOB. Seen 10/2 in ED for 1 bloody BM which resolved. COVID 19 positive 10/3  CTA PE study obtained due to d dimer and tachycardia: No definite acute pulmonary emboli but sensitivity is decreased due to mild motion artifact. Evaluation of the lungs mildly compromised by motion with no acute disease.   Stable on RA, mild pathway   ED initiated IV remdesivir, continue given high risk patient (obesity, DM2, heart disease)  SC lovenox BID per protocol for Dvt prophylaxis  Supportive care   Monitor O2, resp protocol   Suspect tachycardia is reactive

## 2023-10-03 NOTE — ED NOTES
Critical PA at bedside said to hold off on decadron, remdesivir and mucinex until after pt is assessed.       Norma Randle RN  10/03/23 6890

## 2023-10-03 NOTE — H&P
[facility]  H&P  Name: Mark Johns 58 y.o. male I MRN: [id number]  Unit/Bed#: -01 I Date of Admission: 10/3/2023   Date of Service: 10/3/2023 I Hospital Day: 0      Assessment/Plan   * COVID-19  Assessment & Plan  Presented to ED for cough, chest pain, SOB. Seen 10/2 in ED for 1 bloody BM which resolved. COVID 19 positive 10/3  CTA PE study obtained due to d dimer and tachycardia: No definite acute pulmonary emboli but sensitivity is decreased due to mild motion artifact. Evaluation of the lungs mildly compromised by motion with no acute disease. Stable on RA, mild pathway   ED initiated IV remdesivir, continue given high risk patient (obesity, DM2, heart disease)  SC lovenox BID per protocol for Dvt prophylaxis  Supportive care   Monitor O2, resp protocol   Suspect tachycardia is reactive    Atherosclerosis of native arteries of extremities with intermittent claudication, bilateral legs (HCC)  Assessment & Plan  Continue pletal    Chronic ischemic left MCA stroke  Assessment & Plan  status post left carotid stent, right carotid stenosis, had a loop recorder placed for 3 years which did not show any evidence of A-fib   Continue home ASA, brilinta, statin   Mild aphasia at baseline   Follows with neurology outpatient   Currently on keppra 500 mg BID due to "zoning out spells" which are possible but not confirmed seizures. Awaiting outpatient MRI brain. Seizure precautions     Prediabetes  Assessment & Plan  No longer on metformin  ISS and accucheks  DM diet      Type 2 diabetes mellitus ([address])  Assessment & Plan  Lab Results   Component Value Date    HGBA1C 5.3 07/02/2023     No longer on metformin  ISS and accucheks  DM diet        HTN (hypertension)  Assessment & Plan  Continue home losartan and metoprolol         VTE Pharmacologic Prophylaxis: VTE Score: 4 Moderate Risk (Score 3-4) - Pharmacological DVT Prophylaxis Ordered: enoxaparin (Lovenox).   Code Status: Level 1 - Full Code   Discussion with family: Updated  (wife) via phone. Anticipated Length of Stay: Patient will be admitted on an inpatient basis with an anticipated length of stay of greater than 2 midnights secondary to symptoms and monitoring. Total Time Spent on Date of Encounter in care of patient: 60 mins. This time was spent on one or more of the following: performing physical exam; counseling and coordination of care; obtaining or reviewing history; documenting in the medical record; reviewing/ordering tests, medications or procedures; communicating with other healthcare professionals and discussing with patient's family/caregivers. Chief Complaint: cough, SOB    History of Present Illness:  Luis Felipe Mays is a 58 y.o. male with a PMH of prior left MCA, DM2, HTN, HLD, bilateral carotid stenosis, bilateral LE claudication who presents with subjective fevers chills, urinary incontinence and weakness noted by wife. Patient was evaluated in ED yesterday 10/2 due to bloody stools which have reportedly resolved. Patient primarily complaining of fatigue and weakness, denies headache, dizziness, chest pain, SOB, N/V/D, abdominal pain, etc currently. Patient tested positive for COVID 19 in ED. Patient is currently saturating well on RA without evidence of respiratory distress. Review of Systems:  Review of Systems   Constitutional:  Positive for activity change, appetite change, chills, fatigue and fever. HENT:  Positive for congestion. Negative for rhinorrhea and sore throat. Eyes:  Negative for visual disturbance. Respiratory:  Positive for cough and chest tightness. Negative for shortness of breath and wheezing. Cardiovascular:  Negative for chest pain, palpitations and leg swelling. Gastrointestinal:  Negative for abdominal pain, constipation, diarrhea, nausea and vomiting. Genitourinary:  Positive for difficulty urinating. Negative for dysuria and frequency.         Incontinence reported by wife   Musculoskeletal:  Negative for arthralgias and myalgias. Skin:  Negative for rash and wound. Neurological:  Positive for speech difficulty. Negative for dizziness, seizures, syncope, facial asymmetry, light-headedness, numbness and headaches. All other systems reviewed and are negative. Past Medical and Surgical History:   Past Medical History:   Diagnosis Date    Depression     Diabetes mellitus ([address])     patient denies    Dyslipidemia 03/26/2019    GERD (gastroesophageal reflux disease)     Hyperlipidemia     Hypertension     Prediabetes     Prediabetes 04/03/2019    Stroke [facility])        Past Surgical History:   Procedure Laterality Date    ARTHROSCOPIC REPAIR ACL Left     BACK SURGERY      twice    CARPAL TUNNEL RELEASE Right     IR CEREBRAL ANGIOGRAPHY  05/04/2023    IR STROKE ALERT  03/19/2019    SHOULDER SURGERY Left        Meds/Allergies:  Prior to Admission medications    Medication Sig Start Date End Date Taking? Authorizing Provider   acetaminophen (TYLENOL) 325 mg tablet Take 3 tablets (975 mg total) by mouth 3 (three) times a day as needed for mild pain, headaches or fever 6/5/23   Ryan Ramon MD   aspirin (ECOTRIN LOW STRENGTH) 81 mg EC tablet Take 1 tablet (81 mg total) by mouth daily Do not start before April 19, 2023. 4/19/23   Aminah WHYTE PA-C   atorvastatin (LIPITOR) 40 mg tablet Take 40 mg by mouth daily with breakfast    Historical Provider, MD   baclofen 5 MG TABS Take 5 mg by mouth 2 (two) times a day as needed for muscle spasms 6/5/23   Ryan Ramon MD   cilostazol (PLETAL) 100 mg tablet Take 1 tablet (100 mg total) by mouth 2 (two) times a day before meals Do not start before June 6, 2023.  6/6/23   Ryan Ramon MD   citalopram (CeleXA) 20 mg tablet Take 20 mg by mouth daily    Historical Provider, MD   donepezil (ARICEPT) 10 mg tablet Take 1 tablet (10 mg total) by mouth in the morning 6/13/23   Jayla Pettit MD   levETIRAcetam (Keppra) 500 mg tablet Take 1 tab (500 mg) twice a day for 1 week, then take 2 tabs (1000 mg) twice a day. 8/31/23   Louisa Espinoza MD   lidocaine (Lidoderm) 5 % Apply 1 patch topically over 12 hours daily Remove & Discard patch within 12 hours or as directed by MD 4/26/23   Ozzy Emmanuel DO   losartan (COZAAR) 50 mg tablet Take 50 mg by mouth daily    Historical Provider, MD   metFORMIN (GLUCOPHAGE) 500 mg tablet Take 500 mg by mouth daily with breakfast    Historical Provider, MD   metoprolol succinate (TOPROL-XL) 100 mg 24 hr tablet Take 100 mg by mouth daily    Historical Provider, MD   pantoprazole (PROTONIX) 40 mg tablet Take 1 tablet (40 mg total) by mouth daily 6/20/23   Loura Primrose, MD   tamsulosin (FLOMAX) 0.4 mg Take 1 capsule (0.4 mg total) by mouth daily with dinner 6/6/23   Miya Blankenship MD   ticagrelor (BRILINTA) 90 MG Take 1 tablet (90 mg total) by mouth every 12 (twelve) hours 5/12/23   Samir Vazquez MD   traZODone (DESYREL) 100 mg tablet Take 100 mg by mouth daily at bedtime 6/28/23   Historical Provider, MD REYES have reviewed home medications with patient personally. As well as home medication list at bedside and recent outpatient notes. Allergies:    Allergies   Allergen Reactions    Flexeril [Cyclobenzaprine] Drowsiness    Lisinopril Cough    Nsaids Confusion       Social History:  Marital Status: /Civil Union   Occupation: not assessed   Patient Pre-hospital Living Situation: Home, With spouse  Patient Pre-hospital Level of Mobility: unable to be assessed at time of evaluation  Patient Pre-hospital Diet Restrictions: Diabetic   Substance Use History:   Social History     Substance and Sexual Activity   Alcohol Use Never     Social History     Tobacco Use   Smoking Status Former   Smokeless Tobacco Never     Social History     Substance and Sexual Activity   Drug Use Never       Family History:  Family History   Problem Relation Age of Onset    Hyperlipidemia Mother Hypertension Mother     Diabetes unspecified Mother         prediabetes    Heart attack Mother     Diabetes Mother     Hyperlipidemia Father     Hypertension Father     Prostate cancer Father     Stroke Father     Hyperlipidemia Sister     Hypertension Sister     Hyperlipidemia Sister     Hypertension Sister     Depression Sister     Hypertension Sister     Hyperlipidemia Sister     Depression Sister     Hyperlipidemia Brother     Hypertension Brother     Hypertension Brother     Prostate cancer Brother     Hypothyroidism Daughter     Hypothyroidism Son     Diabetes unspecified Son     Seizures Neg Hx        Physical Exam:     Vitals:   Blood Pressure: (!) 181/95 (10/03/23 1558)  Pulse: (!) 110 (10/03/23 1558)  Temperature: 100.3 °F (37.9 °C) (10/03/23 1558)  Temp Source: Oral (10/03/23 0957)  Respirations: 17 (10/03/23 1558)  SpO2: 97 % (10/03/23 1558)    Physical Exam  Vitals and nursing note reviewed. Constitutional:       General: He is not in acute distress. Appearance: He is well-developed. He is obese. He is ill-appearing. HENT:      Head: Normocephalic and atraumatic. Eyes:      General:         Right eye: No discharge. Left eye: No discharge. Extraocular Movements: Extraocular movements intact. Conjunctiva/sclera: Conjunctivae normal.   Cardiovascular:      Rate and Rhythm: Regular rhythm. Tachycardia present. Heart sounds: No murmur heard. Pulmonary:      Effort: Pulmonary effort is normal. No respiratory distress. Breath sounds: Normal breath sounds. No wheezing, rhonchi or rales. Comments: Lungs CTA, stable on RA. Dry cough. Abdominal:      General: Bowel sounds are normal. There is no distension. Palpations: Abdomen is soft. Tenderness: There is no abdominal tenderness. Musculoskeletal:      Cervical back: Neck supple. Right lower leg: No edema. Left lower leg: No edema. Skin:     General: Skin is warm and dry.       Capillary Refill: Capillary refill takes less than 2 seconds. Neurological:      Mental Status: He is alert and oriented to person, place, and time. Mental status is at baseline. Comments: Mild speech difficulty however appears baseline due to prior CVA   Psychiatric:         Mood and Affect: Mood normal.         Behavior: Behavior normal.          Additional Data:     Lab Results:  Results from last 7 days   Lab Units 10/03/23  0739   WBC Thousand/uL 7.25   HEMOGLOBIN g/dL 12.6   HEMATOCRIT % 38.1   PLATELETS Thousands/uL 235   NEUTROS PCT % 78*   LYMPHS PCT % 5*   MONOS PCT % 14*   EOS PCT % 1     Results from last 7 days   Lab Units 10/03/23  0739   SODIUM mmol/L 137   POTASSIUM mmol/L 3.5   CHLORIDE mmol/L 104   CO2 mmol/L 27   BUN mg/dL 18   CREATININE mg/dL 1.15   ANION GAP mmol/L 6   CALCIUM mg/dL 9.7   ALBUMIN g/dL 4.1   TOTAL BILIRUBIN mg/dL 0.45   ALK PHOS U/L 87   ALT U/L 14   AST U/L 16   GLUCOSE RANDOM mg/dL 113     Results from last 7 days   Lab Units 10/03/23  0739   INR  1.08             Results from last 7 days   Lab Units 10/03/23  0739   LACTIC ACID mmol/L 1.4   PROCALCITONIN ng/ml 0.07       Lines/Drains:  Invasive Devices       Peripheral Intravenous Line  Duration             Peripheral IV 10/03/23 Left Antecubital <1 day    Peripheral IV 10/03/23 Right Antecubital <1 day                        Imaging: Reviewed radiology reports from this admission including: chest xray and chest CT scan  CTA ED chest PE study   Final Result by Viraj Pena MD (10/03 1321)      No definite acute pulmonary emboli but sensitivity is decreased due to mild motion artifact. Evaluation of the lungs mildly compromised by motion with no acute disease. Workstation performed: [id number]         XR chest 1 view portable   ED Interpretation by Sheila Birch DO (10/03 0216)   No acute cardiopulmonary process. Appears unchanged when compared to previous.   Loop recorder noted in anterior chest Final Result by Marin Hinson MD (10/03 0620)      No acute cardiopulmonary disease. Workstation performed: [id number]             EKG and Other Studies Reviewed on Admission:   EKG: Sinus Tachycardia. . ** Please Note: This note has been constructed using a voice recognition system.  **

## 2023-10-03 NOTE — RESPIRATORY THERAPY NOTE
RT Protocol Note  Marcos Case 58 y.o. male MRN: [id number]  Unit/Bed#: -01 Encounter: [id number]    Assessment    Principal Problem:    COVID-19  Active Problems:    HTN (hypertension)    Prediabetes    Chronic ischemic left MCA stroke    Atherosclerosis of native arteries of extremities with intermittent claudication, bilateral legs (HCC)      Home Pulmonary Medications:  na       Past Medical History:   Diagnosis Date    Depression     Diabetes mellitus ([address] [facility])     patient denies    Dyslipidemia 03/26/2019    GERD (gastroesophageal reflux disease)     Hyperlipidemia     Hypertension     Prediabetes     Prediabetes 04/03/2019    Stroke [facility])      Social History     Socioeconomic History    Marital status: /Civil Union     Spouse name: None    Number of children: None    Years of education: None    Highest education level: None   Occupational History    None   Tobacco Use    Smoking status: Former    Smokeless tobacco: Never   Vaping Use    Vaping Use: Never used   Substance and Sexual Activity    Alcohol use: Never    Drug use: Never    Sexual activity: None   Other Topics Concern    None   Social History Narrative    [place]     Social Determinants of Health     Financial Resource Strain: Not on file   Food Insecurity: No Food Insecurity (7/3/2023)    Hunger Vital Sign     Worried About Running Out of Food in the Last Year: Never true     Ran Out of Food in the Last Year: Never true   Transportation Needs: No Transportation Needs (7/3/2023)    PRAPARE - Transportation     Lack of Transportation (Medical): No     Lack of Transportation (Non-Medical):  No   Physical Activity: Not on file   Stress: Not on file   Social Connections: Not on file   Intimate Partner Violence: Not on file   Housing Stability: Low Risk  (7/3/2023)    Housing Stability Vital Sign     Unable to Pay for Housing in the Last Year: No     Number of Places Lived in the Last Year: 1     Unstable Housing in the Last Year: No       Subjective         Objective    Physical Exam:   Assessment Type: Assess only  General Appearance: Alert, Awake  Respiratory Pattern: Normal  Chest Assessment: Chest expansion symmetrical  Bilateral Breath Sounds: Clear, Diminished  Cough: Strong, Dry, Non-productive  O2 Device: room air    Vitals:  Blood pressure (!) 181/95, pulse (!) 110, temperature 100.3 °F (37.9 °C), resp. rate 17, SpO2 97 %.           Imaging and other studies:     O2 Device: room air     Plan    Respiratory Plan: No distress/Pulmonary history, Discontinue Protocol        Resp Comments: per pt, quit smoking >50yrs, no resp meds at home, no resp hx, cxr shows no cardiopulmonary disease, pt is +covid, resp protocol d/c'd

## 2023-10-03 NOTE — ASSESSMENT & PLAN NOTE
Lab Results   Component Value Date    HGBA1C 5.3 07/02/2023     No longer on metformin  ISS and accucheks  DM diet

## 2023-10-04 LAB
ALBUMIN SERPL BCP-MCNC: 3.8 G/DL (ref 3.5–5)
ALP SERPL-CCNC: 74 U/L (ref 34–104)
ALT SERPL W P-5'-P-CCNC: 14 U/L (ref 7–52)
ANION GAP SERPL CALCULATED.3IONS-SCNC: 8 MMOL/L
AST SERPL W P-5'-P-CCNC: 21 U/L (ref 13–39)
BASOPHILS # BLD AUTO: 0.06 THOUSANDS/ÂΜL (ref 0–0.1)
BASOPHILS NFR BLD AUTO: 1 % (ref 0–1)
BILIRUB SERPL-MCNC: 0.49 MG/DL (ref 0.2–1)
BUN SERPL-MCNC: 15 MG/DL (ref 5–25)
CALCIUM SERPL-MCNC: 8.8 MG/DL (ref 8.4–10.2)
CHLORIDE SERPL-SCNC: 105 MMOL/L (ref 96–108)
CO2 SERPL-SCNC: 22 MMOL/L (ref 21–32)
CREAT SERPL-MCNC: 0.94 MG/DL (ref 0.6–1.3)
EOSINOPHIL # BLD AUTO: 0.01 THOUSAND/ÂΜL (ref 0–0.61)
EOSINOPHIL NFR BLD AUTO: 0 % (ref 0–6)
ERYTHROCYTE [DISTWIDTH] IN BLOOD BY AUTOMATED COUNT: 12.7 % (ref 11.6–15.1)
GFR SERPL CREATININE-BSD FRML MDRD: 85 ML/MIN/1.73SQ M
GLUCOSE SERPL-MCNC: 75 MG/DL (ref 65–140)
GLUCOSE SERPL-MCNC: 79 MG/DL (ref 65–140)
GLUCOSE SERPL-MCNC: 84 MG/DL (ref 65–140)
GLUCOSE SERPL-MCNC: 86 MG/DL (ref 65–140)
GLUCOSE SERPL-MCNC: 99 MG/DL (ref 65–140)
HCT VFR BLD AUTO: 34.8 % (ref 36.5–49.3)
HGB BLD-MCNC: 11.5 G/DL (ref 12–17)
IMM GRANULOCYTES # BLD AUTO: 0.02 THOUSAND/UL (ref 0–0.2)
IMM GRANULOCYTES NFR BLD AUTO: 0 % (ref 0–2)
LYMPHOCYTES # BLD AUTO: 0.94 THOUSANDS/ÂΜL (ref 0.6–4.47)
LYMPHOCYTES NFR BLD AUTO: 15 % (ref 14–44)
MCH RBC QN AUTO: 31.3 PG (ref 26.8–34.3)
MCHC RBC AUTO-ENTMCNC: 33 G/DL (ref 31.4–37.4)
MCV RBC AUTO: 95 FL (ref 82–98)
MONOCYTES # BLD AUTO: 1.17 THOUSAND/ÂΜL (ref 0.17–1.22)
MONOCYTES NFR BLD AUTO: 19 % (ref 4–12)
NEUTROPHILS # BLD AUTO: 4.02 THOUSANDS/ÂΜL (ref 1.85–7.62)
NEUTS SEG NFR BLD AUTO: 65 % (ref 43–75)
NRBC BLD AUTO-RTO: 0 /100 WBCS
PLATELET # BLD AUTO: 202 THOUSANDS/UL (ref 149–390)
PMV BLD AUTO: 10.3 FL (ref 8.9–12.7)
POTASSIUM SERPL-SCNC: 3.3 MMOL/L (ref 3.5–5.3)
PROT SERPL-MCNC: 6.2 G/DL (ref 6.4–8.4)
RBC # BLD AUTO: 3.68 MILLION/UL (ref 3.88–5.62)
SODIUM SERPL-SCNC: 135 MMOL/L (ref 135–147)
WBC # BLD AUTO: 6.22 THOUSAND/UL (ref 4.31–10.16)

## 2023-10-04 PROCEDURE — 85025 COMPLETE CBC W/AUTO DIFF WBC: CPT | Performed by: STUDENT IN AN ORGANIZED HEALTH CARE EDUCATION/TRAINING PROGRAM

## 2023-10-04 PROCEDURE — 99232 SBSQ HOSP IP/OBS MODERATE 35: CPT | Performed by: PHYSICIAN ASSISTANT

## 2023-10-04 PROCEDURE — 82948 REAGENT STRIP/BLOOD GLUCOSE: CPT

## 2023-10-04 PROCEDURE — 80053 COMPREHEN METABOLIC PANEL: CPT | Performed by: STUDENT IN AN ORGANIZED HEALTH CARE EDUCATION/TRAINING PROGRAM

## 2023-10-04 RX ORDER — POTASSIUM CHLORIDE 20 MEQ/1
40 TABLET, EXTENDED RELEASE ORAL ONCE
Status: COMPLETED | OUTPATIENT
Start: 2023-10-04 | End: 2023-10-04

## 2023-10-04 RX ORDER — ACETAMINOPHEN 325 MG/1
975 TABLET ORAL EVERY 8 HOURS SCHEDULED
Status: DISCONTINUED | OUTPATIENT
Start: 2023-10-04 | End: 2023-10-07 | Stop reason: HOSPADM

## 2023-10-04 RX ORDER — DEXTROSE AND SODIUM CHLORIDE 5; .9 G/100ML; G/100ML
100 INJECTION, SOLUTION INTRAVENOUS CONTINUOUS
Status: DISCONTINUED | OUTPATIENT
Start: 2023-10-04 | End: 2023-10-05

## 2023-10-04 RX ADMIN — POTASSIUM CHLORIDE 40 MEQ: 1500 TABLET, EXTENDED RELEASE ORAL at 12:36

## 2023-10-04 RX ADMIN — GUAIFENESIN 600 MG: 600 TABLET ORAL at 09:27

## 2023-10-04 RX ADMIN — LOSARTAN POTASSIUM 50 MG: 50 TABLET, FILM COATED ORAL at 09:27

## 2023-10-04 RX ADMIN — GUAIFENESIN 600 MG: 600 TABLET ORAL at 23:11

## 2023-10-04 RX ADMIN — REMDESIVIR 100 MG: 100 INJECTION, POWDER, LYOPHILIZED, FOR SOLUTION INTRAVENOUS at 13:16

## 2023-10-04 RX ADMIN — TICAGRELOR 90 MG: 90 TABLET ORAL at 09:34

## 2023-10-04 RX ADMIN — CILOSTAZOL 100 MG: 50 TABLET ORAL at 15:56

## 2023-10-04 RX ADMIN — DONEPEZIL HYDROCHLORIDE 10 MG: 5 TABLET, FILM COATED ORAL at 09:29

## 2023-10-04 RX ADMIN — CILOSTAZOL 100 MG: 50 TABLET ORAL at 05:47

## 2023-10-04 RX ADMIN — DEXTROSE AND SODIUM CHLORIDE 100 ML/HR: 5; .9 INJECTION, SOLUTION INTRAVENOUS at 23:14

## 2023-10-04 RX ADMIN — LEVETIRACETAM 500 MG: 500 TABLET, FILM COATED ORAL at 09:27

## 2023-10-04 RX ADMIN — ACETAMINOPHEN 650 MG: 325 TABLET, FILM COATED ORAL at 02:17

## 2023-10-04 RX ADMIN — TAMSULOSIN HYDROCHLORIDE 0.4 MG: 0.4 CAPSULE ORAL at 15:56

## 2023-10-04 RX ADMIN — ACETAMINOPHEN 975 MG: 325 TABLET, FILM COATED ORAL at 23:11

## 2023-10-04 RX ADMIN — Medication 6 MG: at 23:11

## 2023-10-04 RX ADMIN — ENOXAPARIN SODIUM 30 MG: 100 INJECTION SUBCUTANEOUS at 23:12

## 2023-10-04 RX ADMIN — TICAGRELOR 90 MG: 90 TABLET ORAL at 23:13

## 2023-10-04 RX ADMIN — PANTOPRAZOLE SODIUM 40 MG: 40 TABLET, DELAYED RELEASE ORAL at 09:28

## 2023-10-04 RX ADMIN — ENOXAPARIN SODIUM 30 MG: 100 INJECTION SUBCUTANEOUS at 09:27

## 2023-10-04 RX ADMIN — ASPIRIN 81 MG: 81 TABLET, COATED ORAL at 09:27

## 2023-10-04 RX ADMIN — ATORVASTATIN CALCIUM 40 MG: 40 TABLET, FILM COATED ORAL at 09:34

## 2023-10-04 RX ADMIN — DEXTROSE AND SODIUM CHLORIDE 100 ML/HR: 5; .9 INJECTION, SOLUTION INTRAVENOUS at 12:36

## 2023-10-04 RX ADMIN — LEVETIRACETAM 500 MG: 500 TABLET, FILM COATED ORAL at 23:11

## 2023-10-04 RX ADMIN — METOPROLOL SUCCINATE 100 MG: 50 TABLET, EXTENDED RELEASE ORAL at 09:27

## 2023-10-04 NOTE — UTILIZATION REVIEW
NOTIFICATION OF INPATIENT ADMISSION   AUTHORIZATION REQUEST   SERVICING FACILITY:   [address] [place] [address]  [address] [facility],Third [address] [address]  Tax ID: [id number]  NPI: [id number] ATTENDING PROVIDER:  Attending Name and NPI#: Camelia Bojorquez Md [[id number]]  Address: [address] [facility],Third [address] [address]  Phone: [phone]   ADMISSION INFORMATION:  Place of Service: [address] [place] [address]  Place of Service Code: 21  Inpatient Admission Date/Time: 10/3/23  2:04 PM  Discharge Date/Time: No discharge date for patient encounter. Admitting Diagnosis Code/Description:  Sinus tachycardia [R00.0]  Hypoxia [R09.02]  H/O: stroke [Z86.73]  Fever [R50.9]  COVID-19 [U07.1]     UTILIZATION REVIEW CONTACT:  Asad Parisi Utilization   Network Utilization Review Department  Phone: [phone]  Fax [phone]  Email: [email or web address]  Contact for approvals/pending authorizations, clinical reviews, and discharge. PHYSICIAN ADVISORY SERVICES:  Medical Necessity Denial & Hblb-[id number] Review  Phone: [phone]  Fax: [phone]  Email: [email or web address]     DISCHARGE SUPPORT TEAM:  For Patients Discharge Needs & Updates  Phone: [phone] opt. 2 Fax: [phone]  Email: [email or web address]

## 2023-10-04 NOTE — PROGRESS NOTES
[address]  Progress Note  Name: Eunice Cabrera  MRN: [id number]  Unit/Bed#: -01 I Date of Admission: 10/3/2023   Date of Service: 10/4/2023 I Hospital Day: 1    Assessment/Plan   * Sepsis due to COVID-19 [facility])  Assessment & Plan  Presented to ED for cough, chest pain, SOB. Seen 10/2 in ED for 1 bloody BM which resolved. COVID 19 positive 10/3  CTA PE study obtained due to d dimer and tachycardia: No definite acute pulmonary emboli but sensitivity is decreased due to mild motion artifact. Evaluation of the lungs mildly compromised by motion with no acute disease. Stable on RA, mild pathway   ED initiated IV remdesivir, continue given high risk patient (obesity, DM2, heart disease)  SC lovenox BID per protocol for Dvt prophylaxis  Supportive care   Monitor O2, resp protocol   Suspect tachycardia is reactive  10/4 - pt with poor appetite, diaphoretic. Start IVF with NS/D5  PT/OT eval    Atherosclerosis of native arteries of extremities with intermittent claudication, bilateral legs ([address])  Assessment & Plan  Continue pletal    Chronic ischemic left MCA stroke  Assessment & Plan  status post left carotid stent, right carotid stenosis, had a loop recorder placed for 3 years which did not show any evidence of A-fib   Continue home ASA, brilinta, statin   Mild aphasia at baseline   Follows with neurology outpatient   Currently on keppra 500 mg BID due to "zoning out spells" which are possible but not confirmed seizures. Patient had outpatient MRI scheduled due to concern for recurrent CVA, missed appointment due to hospitalization. Likely discharge to rehab. We will attempt to obtain while inpatient as to not delay care.   Seizure precautions     Prediabetes  Assessment & Plan  No longer on metformin  ISS and accucheks  DM diet      Primary hypertension  Assessment & Plan  Continue home losartan and metoprolol               VTE Pharmacologic Prophylaxis: VTE Score: 4 Moderate Risk (Score 3-4) - Pharmacological DVT Prophylaxis Ordered: enoxaparin (Lovenox). Patient Centered Rounds: I performed bedside rounds with nursing staff today. Discussions with Specialists or Other Care Team Provider: None    Education and Discussions with Family / Patient: Updated  (wife) via phone. Total Time Spent on Date of Encounter in care of patient: 60 mins. This time was spent on one or more of the following: performing physical exam; counseling and coordination of care; obtaining or reviewing history; documenting in the medical record; reviewing/ordering tests, medications or procedures; communicating with other healthcare professionals and discussing with patient's family/caregivers. Current Length of Stay: 1 day(s)  Current Patient Status: Inpatient   Certification Statement: The patient will continue to require additional inpatient hospital stay due to [facility]  Discharge Plan: Anticipate discharge tomorrow to discharge location to be determined pending rehab evaluations. Code Status: Level 1 - Full Code    Subjective:   No acute events overnight. Pt continues to feel unwell, poor appetite, weak. Objective:     Vitals:   Temp (24hrs), Av.8 °F (37.7 °C), Min:97.8 °F (36.6 °C), Max:101.5 °F (38.6 °C)    Temp:  [97.8 °F (36.6 °C)-101.5 °F (38.6 °C)] 99.3 °F (37.4 °C)  HR:  [] 91  Resp:  [16-22] 17  BP: (123-181)/(60-95) 123/66  SpO2:  [93 %-98 %] 94 %  There is no height or weight on file to calculate BMI. Input and Output Summary (last 24 hours):   No intake or output data in the 24 hours ending 10/04/23 1200    Physical Exam:   Physical Exam  Vitals and nursing note reviewed. Constitutional:       Appearance: Normal appearance. He is diaphoretic. HENT:      Head: Normocephalic and atraumatic. Mouth/Throat:      Mouth: Mucous membranes are moist.      Pharynx: Oropharynx is clear. No oropharyngeal exudate.    Eyes:      Extraocular Movements: Extraocular movements intact. Cardiovascular:      Rate and Rhythm: Normal rate and regular rhythm. Pulses: Normal pulses. Heart sounds: Normal heart sounds. No murmur heard. No friction rub. No gallop. Pulmonary:      Effort: Pulmonary effort is normal. No respiratory distress. Breath sounds: Normal breath sounds. No stridor. No wheezing or rales. Abdominal:      General: Abdomen is flat. Bowel sounds are normal. There is no distension. Palpations: Abdomen is soft. Tenderness: There is no abdominal tenderness. Musculoskeletal:      Right lower leg: No edema. Left lower leg: No edema. Skin:     General: Skin is warm. Neurological:      General: No focal deficit present. Mental Status: He is alert and oriented to person, place, and time. Additional Data:     Labs:  Results from last 7 days   Lab Units 10/04/23  0237   WBC Thousand/uL 6.22   HEMOGLOBIN g/dL 11.5*   HEMATOCRIT % 34.8*   PLATELETS Thousands/uL 202   NEUTROS PCT % 65   LYMPHS PCT % 15   MONOS PCT % 19*   EOS PCT % 0     Results from last 7 days   Lab Units 10/04/23  0237   SODIUM mmol/L 135   POTASSIUM mmol/L 3.3*   CHLORIDE mmol/L 105   CO2 mmol/L 22   BUN mg/dL 15   CREATININE mg/dL 0.94   ANION GAP mmol/L 8   CALCIUM mg/dL 8.8   ALBUMIN g/dL 3.8   TOTAL BILIRUBIN mg/dL 0.49   ALK PHOS U/L 74   ALT U/L 14   AST U/L 21   GLUCOSE RANDOM mg/dL 84     Results from last 7 days   Lab Units 10/03/23  0739   INR  1.08     Results from last 7 days   Lab Units 10/04/23  0751 10/03/23  2049 10/03/23  1651   POC GLUCOSE mg/dl 86 90 91         Results from last 7 days   Lab Units 10/03/23  0739   LACTIC ACID mmol/L 1.4   PROCALCITONIN ng/ml 0.07       Lines/Drains:  Invasive Devices       Peripheral Intravenous Line  Duration             Peripheral IV 10/03/23 Left Antecubital 1 day                          Imaging: No pertinent imaging reviewed.     Recent Cultures (last 7 days):   Results from last 7 days   Lab Units 10/03/23  0739   BLOOD CULTURE  Received in Microbiology Lab. Culture in Progress. Received in Microbiology Lab. Culture in Progress. Last 24 Hours Medication List:   Current Facility-Administered Medications   Medication Dose Route Frequency Provider Last Rate    acetaminophen  650 mg Oral Q6H PRN Juan Glenis Fountaine, TIM      aspirin  81 mg Oral Daily Juan Glenis Ry, PA-MARIELA      atorvastatin  40 mg Oral Daily With Breakfast Juan Glenis Fokaylahaine, PA-MARIELA      baclofen  5 mg Oral BID PRN Juan Glenis Fountaine, PA-MARIELA      cilostazol  100 mg Oral BID AC LUANN Martin-C      dextrose 5 % and sodium chloride 0.9 %  100 mL/hr Intravenous Continuous Ryan Morse PA-C      donepezil  10 mg Oral Daily Juan Glenis FokaylahaineTIM      enoxaparin  30 mg Subcutaneous Q12H [address] Sep[place] Keisha RyTIM      guaiFENesin  600 mg Oral Q12H [address] Sepidehjosesito Aldrich PA-C      insulin lispro  1-5 Units Subcutaneous HS Juan Glenis Fountaine, PA-MARIELA      insulin lispro  1-6 Units Subcutaneous TID AC Sepideh Aldrich PA-C      levETIRAcetam  500 mg Oral Q12H [address] Sepideh Aldrich PA-C      losartan  50 mg Oral Daily Jungbryan Gaines PA-C      melatonin  6 mg Oral HS Sepideh Aldrich PA-C      metoprolol succinate  100 mg Oral Daily Juan Glenis FountaineTIM      pantoprazole  40 mg Oral Daily Juan Glenis FountaineTIM      potassium chloride  40 mEq Oral Once Ryan Morse PA-C      remdesivir  100 mg Intravenous Q24H Juan Glenis FoTIM weeks      tamsulosin  0.4 mg Oral Daily With Fon[employer] SoupTIM      ticagrelor  90 mg Oral Q12H [address] Erich Gaines PA-C          Today, Patient Was Seen By: Ryan Morse PA-C    **Please Note: This note may have been constructed using a voice recognition system. **

## 2023-10-04 NOTE — PLAN OF CARE
Problem: Potential for Falls  Goal: Patient will remain free of falls  Description: INTERVENTIONS:  - Educate patient/family on patient safety including physical limitations  - Instruct patient to call for assistance with activity   - Consult OT/PT to assist with strengthening/mobility   - Keep Call bell within reach  - Keep bed low and locked with side rails adjusted as appropriate  - Keep care items and personal belongings within reach  - Initiate and maintain comfort rounds  - Make Fall Risk Sign visible to staff  - Offer Toileting every x Hours, in advance of need  - Initiate/Maintain xalarm  - Obtain necessary fall risk management equipment: x  - Apply yellow socks and bracelet for high fall risk patients  - Consider moving patient to room near nurses station  Outcome: Progressing     Problem: MOBILITY - ADULT  Goal: Maintain or return to baseline ADL function  Description: INTERVENTIONS:  -  Assess patient's ability to carry out ADLs; assess patient's baseline for ADL function and identify physical deficits which impact ability to perform ADLs (bathing, care of mouth/teeth, toileting, grooming, dressing, etc.)  - Assess/evaluate cause of self-care deficits   - Assess range of motion  - Assess patient's mobility; develop plan if impaired  - Assess patient's need for assistive devices and provide as appropriate  - Encourage maximum independence but intervene and supervise when necessary  - Involve family in performance of ADLs  - Assess for home care needs following discharge   - Consider OT consult to assist with ADL evaluation and planning for discharge  - Provide patient education as appropriate  Outcome: Progressing  Goal: Maintains/Returns to pre admission functional level  Description: INTERVENTIONS:  - Perform BMAT or MOVE assessment daily.   - Set and communicate daily mobility goal to care team and patient/family/caregiver.    - Collaborate with rehabilitation services on mobility goals if consulted  - Perform Range of Motion x times a day. - Reposition patient every x hours.   - Dangle patient x times a day  - Stand patient x times a day  - Ambulate patient x times a day  - Out of bed to chair x times a day   - Out of bed for meals x times a day  - Out of bed for toileting  - Record patient progress and toleration of activity level   Outcome: Progressing     Problem: INFECTION - ADULT  Goal: Absence or prevention of progression during hospitalization  Description: INTERVENTIONS:  - Assess and monitor for signs and symptoms of infection  - Monitor lab/diagnostic results  - Monitor all insertion sites, i.e. indwelling lines, tubes, and drains  - Monitor endotracheal if appropriate and nasal secretions for changes in amount and color  - [place] appropriate cooling/warming therapies per order  - Administer medications as ordered  - Instruct and encourage patient and family to use good hand hygiene technique  - Identify and instruct in appropriate isolation precautions for identified infection/condition  Outcome: Progressing  Goal: Absence of fever/infection during neutropenic period  Description: INTERVENTIONS:  - Monitor WBC    Outcome: Progressing     Problem: Prexisting or High Potential for Compromised Skin Integrity  Goal: Skin integrity is maintained or improved  Description: INTERVENTIONS:  - Identify patients at risk for skin breakdown  - Assess and monitor skin integrity  - Assess and monitor nutrition and hydration status  - Monitor labs   - Assess for incontinence   - Turn and reposition patient  - Assist with mobility/ambulation  - Relieve pressure over bony prominences  - Avoid friction and shearing  - Provide appropriate hygiene as needed including keeping skin clean and dry  - Evaluate need for skin moisturizer/barrier cream  - Collaborate with interdisciplinary team   - Patient/family teaching  - Consider wound care consult   Outcome: Progressing

## 2023-10-04 NOTE — ASSESSMENT & PLAN NOTE
status post left carotid stent, right carotid stenosis, had a loop recorder placed for 3 years which did not show any evidence of A-fib   Continue home ASA, brilinta, statin   Mild aphasia at baseline   Follows with neurology outpatient   Currently on keppra 500 mg BID due to "zoning out spells" which are possible but not confirmed seizures. Patient had outpatient MRI scheduled due to concern for recurrent CVA, missed appointment due to hospitalization. Likely discharge to rehab. We will attempt to obtain while inpatient as to not delay care.   Seizure precautions

## 2023-10-04 NOTE — ASSESSMENT & PLAN NOTE
Presented to ED for cough, chest pain, SOB. Seen 10/2 in ED for 1 bloody BM which resolved. COVID 19 positive 10/3  CTA PE study obtained due to d dimer and tachycardia: No definite acute pulmonary emboli but sensitivity is decreased due to mild motion artifact. Evaluation of the lungs mildly compromised by motion with no acute disease. Stable on RA, mild pathway   ED initiated IV remdesivir, continue given high risk patient (obesity, DM2, heart disease)  SC lovenox BID per protocol for Dvt prophylaxis  Supportive care   Monitor O2, resp protocol   Suspect tachycardia is reactive  10/4 - pt with poor appetite, diaphoretic.  Start IVF with NS/D5  PT/OT eval

## 2023-10-04 NOTE — UTILIZATION REVIEW
Initial Clinical Review    Admission: Date/Time/Statement:   Admission Orders (From admission, onward)     Ordered        10/03/23 1403  INPATIENT ADMISSION  Once                      Orders Placed This Encounter   Procedures   • INPATIENT ADMISSION     Standing Status:   Standing     Number of Occurrences:   1     Order Specific Question:   Level of Care     Answer:   Med Surg [16]     Order Specific Question:   Estimated length of stay     Answer:   More than 2 Midnights     Order Specific Question:   Certification     Answer:   I certify that inpatient services are medically necessary for this patient for a duration of greater than two midnights. See H&P and MD Progress Notes for additional information about the patient's course of treatment. ED Arrival Information     Expected   -    Arrival   10/3/2023 07:28    Acuity   Urgent            Means of arrival   Ambulance    Escorted by   [facility]    Service   Hospitalist    Admission type   Emergency            Arrival complaint   -           Chief Complaint   Patient presents with   • Fever     Was seen here yesterday, wife states he was incont of urine this AM and weak getting out of bed. Had a fever and a cough [id number] per EMS       Initial Presentation: 58 y.o. male presents to the ED via EMS from home with c/o fevers, chills, urinary incontinence x 1 day, wife notes weakness, has fatigue. Was seen on 10/2 with c/o bloody stool which has resolved. PMH: prior left MCA, DM2, HTN, HLD, bilateral carotid stenosis, bilateral LE claudication. In the ED pt if febrile and tachycardic. Labs - + Covid, elevated CRP, D dimer, low ferritin, abnormal UA. Imaging - no definite PE But movement on study. Treated with IV fluids, Tylenol PO, IV Remdesivir, Mucinex. On exam good sats w/ respiratory distress, ill-appearing, tachycardic, residual mild speech difficulty, dry cough, lungs clear.   He is admitted to INPATIENT status with Covid 19 - IV Remdesivir, isolation, sq Lovenox. Chronic ischemic L MCA stroke on Keppra BID - continue. Date: 10/4   Day 2:   Off oxygen, intermittently febrile, not tachycardic since last evening. MRI Brain pending. Low K 3.3. Pt is started on IV fluids d/t poor appetite, diaphoresis. Stable on Room air. On exam he feels unwell, lungs clear, no change in mental status. Missed OP MRI appt for eval of concern for recurrent CVA, will try to get it while IP.       ED Triage Vitals [10/03/23 0731]   Temperature Pulse Respirations Blood Pressure SpO2   100.5 °F (38.1 °C) (!) 120 20 118/67 97 %      Temp Source Heart Rate Source Patient Position - Orthostatic VS BP Location FiO2 (%)   Oral Monitor Sitting Left arm --      Pain Score       No Pain          Wt Readings from Last 1 Encounters:   10/02/23 88.5 kg (195 lb)     Additional Vital Signs:   10/04/23 14:37:03 99.4 °F (37.4 °C) 89 20 157/72 100 95 % None (Room air)     10/04/23 07:53:51 99.3 °F (37.4 °C) 91 17 123/66 85 94 % --   10/04/23 05:43:16 99.6 °F (37.6 °C) 89 -- -- -- 93 % --   10/04/23 02:13:34 100.7 °F (38.2 °C) Abnormal  98 -- -- -- 96 % --   10/04/23 00:10:14 97.8 °F (36.6 °C) 94 -- -- -- 98 % None (Room air)   10/03/23 21:02:45 99.2 °F (37.3 °C) 102 -- 145/74 98 96 % --   10/03/23 18:25:29 101.5 °F (38.6 °C) Abnormal  111 Abnormal  20 128/63 85 95 % None (Room air)   10/03/23 1600 -- -- -- -- -- 97 % None (Room air)   10/03/23 15:58:12 100.3 °F (37.9 °C) 110 Abnormal  17 181/95 Abnormal  124 97 % --   10/03/23 15:57:15 100.3 °F (37.9 °C) 114 Abnormal  18 181/95 Abnormal  124 98 % --   10/03/23 1500 -- 111 Abnormal  19 155/78 -- 96 % None (Room air)   10/03/23 1430 -- 109 Abnormal  16 171/81 Abnormal  116 96 % None (Room air)   10/03/23 1415 -- 108 Abnormal  16 -- -- 97 % None (Room air)   10/03/23 1330 -- 101 16 156/72 103 97 % None (Room air)   10/03/23 1300 -- 107 Abnormal  22 145/69 102 97 % None (Room air)   10/03/23 1230 -- 105 16 133/60 87 96 % None (Room air) 10/03/23 1215 -- 101 22 -- -- 96 % None (Room air)   10/03/23 1200 -- 100 20 131/60 86 96 % None (Room air)   10/03/23 1130 -- 108 Abnormal  15 117/56 81 97 % None (Room air)   10/03/23 1100 -- 111 Abnormal  17 123/55 79 98 % None (Room air)   10/03/23 1000 -- 112 Abnormal  19 149/69 99 97 % None (Room air)   10/03/23 0957 99.8 °F (37.7 °C) -- -- -- -- -- --   10/03/23 0930 -- 110 Abnormal  19 145/64 92 -- --   10/03/23 0915 -- 120 Abnormal  22 173/78 Abnormal  112 97 % None (Room air)   10/03/23 0830 -- 120 Abnormal  23 Abnormal  174/79 Abnormal  113 -- --   10/03/23 0800 -- 114 Abnormal  14 147/70 101 97 % None (Room air)     Pertinent Labs/Diagnostic Test Results:     10/3 ECG - Sinus tachycardia  Nonspecific ST and T wave abnormality  Abnormal ECG    CTA ED chest PE study   Final Result by Rikki Martinez MD (10/03 1321)      No definite acute pulmonary emboli but sensitivity is decreased due to mild motion artifact. Evaluation of the lungs mildly compromised by motion with no acute disease. XR chest 1 view portable      No acute cardiopulmonary disease.       MRI inpatient order   brain (Results Pending)        Results from last 7 days   Lab Units 10/03/23  0744   SARS-COV-2  Positive*     Results from last 7 days   Lab Units 10/04/23  0237 10/03/23  0739 10/02/23  1348   WBC Thousand/uL 6.22 7.25 8.29   HEMOGLOBIN g/dL 11.5* 12.6 13.0   HEMATOCRIT % 34.8* 38.1 40.3   PLATELETS Thousands/uL 202 235 263   NEUTROS ABS Thousands/µL 4.02 5.67 5.98         Results from last 7 days   Lab Units 10/04/23  0237 10/03/23  0739 10/02/23  1348   SODIUM mmol/L 135 137 138   POTASSIUM mmol/L 3.3* 3.5 4.1   CHLORIDE mmol/L 105 104 105   CO2 mmol/L 22 27 27   ANION GAP mmol/L 8 6 6   BUN mg/dL 15 18 22   CREATININE mg/dL 0.94 1.15 1.23   EGFR ml/min/1.73sq m 85 66 61   CALCIUM mg/dL 8.8 9.7 9.9     Results from last 7 days   Lab Units 10/04/23  0237 10/03/23  0739 10/02/23  1348   AST U/L 21 16 14   ALT U/L 14 14 13   ALK PHOS U/L 74 87 86   TOTAL PROTEIN g/dL 6.2* 7.4 7.0   ALBUMIN g/dL 3.8 4.1 4.1   TOTAL BILIRUBIN mg/dL 0.49 0.45 0.40     Results from last 7 days   Lab Units 10/04/23  0751 10/03/23  2049 10/03/23  1651   POC GLUCOSE mg/dl 86 90 91     Results from last 7 days   Lab Units 10/04/23  0237 10/03/23  0739 10/02/23  1348   GLUCOSE RANDOM mg/dL 84 113 117     Results from last 7 days   Lab Units 10/03/23  1208 10/03/23  0936 10/03/23  0739 10/02/23  1636 10/02/23  1348   HS TNI 0HR ng/L  --   --  16  --  11   HS TNI 2HR ng/L  --  20  --  10  --    HSTNI D2 ng/L  --  4  --  -1  --    HS TNI 4HR ng/L 26  --   --   --   --    HSTNI D4 ng/L 10  --   --   --   --      Results from last 7 days   Lab Units 10/03/23  0739   D-DIMER QUANTITATIVE ug/ml FEU 1.43*     Results from last 7 days   Lab Units 10/03/23  0739 10/02/23  1348   PROTIME seconds 14.4 13.3   INR  1.08 0.94   PTT seconds 31 28         Results from last 7 days   Lab Units 10/03/23  0739   PROCALCITONIN ng/ml 0.07     Results from last 7 days   Lab Units 10/03/23  0739   LACTIC ACID mmol/L 1.4                 Results from last 7 days   Lab Units 10/03/23  0739   FERRITIN ng/mL 21*     Results from last 7 days   Lab Units 10/03/23  1045   CLARITY UA  Clear   COLOR UA  Yellow   SPEC GRAV UA  1.025   PH UA  5.5   GLUCOSE UA mg/dl 70 (7/100%)*   KETONES UA mg/dl 10 (1+)*   BLOOD UA  Negative   PROTEIN UA mg/dl 30 (1+)*   NITRITE UA  Negative   BILIRUBIN UA  Negative   UROBILINOGEN UA (BE) mg/dl <2.0   LEUKOCYTES UA  Negative   WBC UA /hpf 0-1   RBC UA /hpf 0-1   BACTERIA UA /hpf Occasional   EPITHELIAL CELLS WET PREP /hpf Occasional   MUCUS THREADS  Occasional*     Results from last 7 days   Lab Units 10/03/23  0744   INFLUENZA A PCR  Negative   INFLUENZA B PCR  Negative   RSV PCR  Negative     Results from last 7 days   Lab Units 10/03/23  0739   BLOOD CULTURE  Received in Microbiology Lab. Culture in Progress. Received in Microbiology Lab.  Culture in Progress.      ED Treatment:   Medication Administration from 10/03/2023 0728 to 10/03/2023 1540       Date/Time Order Dose Route Action     10/03/2023 0750 EDT multi-electrolyte (ISOLYTE-S PH 7.4) bolus 1,000 mL 1,000 mL Intravenous New Bag     10/03/2023 4429 EDT acetaminophen (TYLENOL) tablet 650 mg 650 mg Oral Given     10/03/2023 1449 EDT remdesivir Rachell Bon) 200 mg in sodium chloride 0.9 % 290 mL IVPB 200 mg Intravenous New Bag     10/03/2023 1247 EDT iohexol (OMNIPAQUE) 350 MG/ML injection (MULTI-DOSE) 85 mL 85 mL Intravenous Given     10/03/2023 1450 EDT guaiFENesin (MUCINEX) 12 hr tablet 600 mg 600 mg Oral Given        Past Medical History:   Diagnosis Date   • Depression    • Diabetes mellitus ([address] [facility])     patient denies   • Dyslipidemia 03/26/2019   • GERD (gastroesophageal reflux disease)    • Hyperlipidemia    • Hypertension    • Prediabetes    • Prediabetes 04/03/2019   • Stroke [facility])      Present on Admission:  • Atherosclerosis of native arteries of extremities with intermittent claudication, bilateral legs (HCC)  • Primary hypertension  • Prediabetes      Admitting Diagnosis: Sinus tachycardia [R00.0]  Hypoxia [R09.02]  H/O: stroke [Z86.73]  Fever [R50.9]  COVID-19 [U07.1]  Age/Sex: 58 y.o. male  Admission Orders:  Scheduled Medications:  aspirin, 81 mg, Oral, Daily  atorvastatin, 40 mg, Oral, Daily With Breakfast  cilostazol, 100 mg, Oral, BID AC  donepezil, 10 mg, Oral, Daily  enoxaparin, 30 mg, Subcutaneous, Q12H JOSE  guaiFENesin, 600 mg, Oral, Q12H JOSE  insulin lispro, 1-5 Units, Subcutaneous, HS  insulin lispro, 1-6 Units, Subcutaneous, TID AC  levETIRAcetam, 500 mg, Oral, Q12H JOSE  losartan, 50 mg, Oral, Daily  melatonin, 6 mg, Oral, HS  metoprolol succinate, 100 mg, Oral, Daily  pantoprazole, 40 mg, Oral, Daily  potassium chloride, 40 mEq, Oral, Once  remdesivir, 100 mg, Intravenous, Q24H  tamsulosin, 0.4 mg, Oral, Daily With Dinner  ticagrelor, 90 mg, Oral, Q12H [address]      Continuous IV Infusions:  dextrose 5 % and sodium chloride 0.9 %, 100 mL/hr, Intravenous, Continuous      PRN Meds:  acetaminophen, 650 mg, Oral, Q6H PRN  baclofen, 5 mg, Oral, BID PRN    Isolation        Network Utilization Review Department  ATTENTION: Please call with any questions or concerns to [phone] and carefully listen to the prompts so that you are directed to the right person. All voicemails are confidential.   For Discharge needs, contact Care Management DC Support Team at [phone] opt. [phone]  Send all requests for admission clinical reviews, approved or denied determinations and any other requests to dedicated fax number below belonging to the campus where the patient is receiving treatment.  List of dedicated fax numbers for the Facilities:  [place] DENIALS (Administrative/Medical Necessity) [phone]   DISCHARGE SUPPORT TEAM (NETWORK) [address] [address] (Maternity/NICU/Pediatrics) [phone]   [address] [facility] 1000 [facility] [phone]   [address] [place] [address] [address] [place] [address] [address] [address] [facility] [address] [facility] [address] 1300 [facility] [address]  [phone]

## 2023-10-04 NOTE — PLAN OF CARE
Problem: Potential for Falls  Goal: Patient will remain free of falls  Description: INTERVENTIONS:  - Educate patient/family on patient safety including physical limitations  - Instruct patient to call for assistance with activity   - Consult OT/PT to assist with strengthening/mobility   - Keep Call bell within reach  - Keep bed low and locked with side rails adjusted as appropriate  - Keep care items and personal belongings within reach  - Initiate and maintain comfort rounds  - Make Fall Risk Sign visible to staff  - Offer Toileting every 2  Problem: INFECTION - ADULT  Goal: Absence or prevention of progression during hospitalization  Description: INTERVENTIONS:  - Assess and monitor for signs and symptoms of infection  - Monitor lab/diagnostic results  - Monitor all insertion sites, i.e. indwelling lines, tubes, and drains  - Monitor endotracheal if appropriate and nasal secretions for changes in amount and color  - [place] appropriate cooling/warming therapies per order  - Administer medications as ordered  - Instruct and encourage patient and family to use good hand hygiene technique  - Identify and instruct in appropriate isolation precautions for identified infection/condition  Outcome: Progressing  Goal: Absence of fever/infection during neutropenic period  Description: INTERVENTIONS:  - Monitor WBC    Outcome: Progressing     Problem: Prexisting or High Potential for Compromised Skin Integrity  Goal: Skin integrity is maintained or improved  Description: INTERVENTIONS:  - Identify patients at risk for skin breakdown  - Assess and monitor skin integrity  - Assess and monitor nutrition and hydration status  - Monitor labs   - Assess for incontinence   - Turn and reposition patient  - Assist with mobility/ambulation  - Relieve pressure over bony prominences  - Avoid friction and shearing  - Provide appropriate hygiene as needed including keeping skin clean and dry  - Evaluate need for skin moisturizer/barrier cream  - Collaborate with interdisciplinary team   - Patient/family teaching  - Consider wound care consult   Outcome: Progressing    Hours, in advance of need  - Initiate/Maintain alarm  - Obtain necessary fall risk management equipment  - Apply yellow socks and bracelet for high fall risk patients  - Consider moving patient to room near nurses station  Outcome: Progressing

## 2023-10-04 NOTE — CASE MANAGEMENT
Case Management Assessment & Discharge Planning Note    Patient name Garth Acosta  Location /-41 MRN [id number]  : 1960 Date 10/4/2023       Current Admission Date: 10/3/2023  Current Admission Diagnosis:Sepsis due to [address])   Patient Active Problem List    Diagnosis Date Noted    Sepsis due to [address]) 10/03/2023    Focal epilepsy ([address]) 2023    Claudication of both lower extremities ([address]) 2023    Type 2 diabetes mellitus with other diabetic ophthalmic complication ([address])     Aphasia 2023    Atherosclerosis of native arteries of extremities with intermittent claudication, bilateral legs ([address]) 2023    Benign prostatic hyperplasia with lower urinary tract symptoms 2023    Possible NPH (normal pressure hydrocephalus) ([address]) 2023    Muscle spasms of both lower extremities 2023    Multiple thyroid nodules 2023    Abnormal laboratory test 05/15/2023    History of tobacco use 2023    Chronic ischemic left MCA stroke 2023    Hypokalemia 2023    Infrarenal abdominal aortic aneurysm (AAA) without rupture ([address]) 2023    Tachycardia 2023    Prediabetes 2023    SIRS (systemic inflammatory response syndrome) ([address]) 2023    Chronic anticoagulation 2023    Stroke ([address]) 2023    Hyponatremia 2023    Middle cerebral artery stenosis, right 2023    Left posterior MCA stroke - etiology unclear at this time 2023    Chronic low back pain 2023    Hypertensive encephalopathy, transient 2023    Snoring 08/10/2020    Memory deficits 08/10/2020    Chronic ischemic right MCA stroke 2019    Status post placement of implantable loop recorder 2019    Type 2 diabetes mellitus ([address]) 2019    Anxiety and depression 2019    Insomnia 2019    Fall 2019    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 ([address]) 2019    Urinary retention 03/27/2019    At risk for venous thromboembolism (VTE) 03/26/2019    Hypertriglyceridemia 03/26/2019    Nicotine dependence 03/26/2019    Carotid stenosis, bilateral 03/26/2019    Presbyopia 03/26/2019    History of stroke 03/19/2019    Headache 03/19/2019    Primary hypertension 03/19/2019    GERD (gastroesophageal reflux disease) 03/19/2019      LOS (days): 1  Geometric Mean LOS (GMLOS) (days):   Days to GMLOS:     OBJECTIVE:    Risk of Unplanned Readmission Score: 28.48         Current admission status: Inpatient       Preferred Pharmacy:   [facility] [address] VALDEZ AYALA [address]  [address] [facility] [address]  Phone: [phone] Fax: 128 [facility], [address] [place] [address],[address]  [address] [facility] [address] [place] [address]  Phone: [phone] Fax: [phone]    Primary Care Provider: ERIC Heredia    Primary Insurance: BLUE CROSS  Secondary Insurance: Lang Gloprema [facility] HOSPITAL REP    ASSESSMENT:  [address], [address] Representative - Spouse   Primary Phone: [phone] [facility]  Home Phone: [phone]                 Advance Directives  Does patient have a [address]?: Yes  Does patient have Advance Directives?: Yes  Advance Directives: Power of  for health care  Primary Contact: Wes Charlyelyse         Readmission Root Cause  30 Day Readmission: No    Patient Information  Admitted from[de-identified] Home  Mental Status: Alert  During Assessment patient was accompanied by: Spouse  Assessment information provided by[de-identified] Spouse  Primary Caregiver: Self  Support Systems: Spouse/significant other  [place] of Residence: [address] [place] [address] do you live in?: [facility] entry access options.  Select all that apply.: Stairs  Number of steps to enter home.: 1  Type of Current Residence: 2 story home  Upon entering residence, is there a bedroom on the main floor (no further steps)?: Yes  Upon entering residence, is there a bathroom on the main floor (no further steps)?: Yes  In the last 12 months, was there a time when you were not able to pay the mortgage or rent on time?: No  In the last 12 months, how many places have you lived?: 1  In the last 12 months, was there a time when you did not have a steady place to sleep or slept in a shelter (including now)?: No  Homeless/housing insecurity resource given?: N/A  Living Arrangements: Lives w/ Spouse/significant other  Is patient a ?: No    Activities of Daily Living Prior to Admission  Functional Status: Independent  Completes ADLs independently?: Yes  Ambulates independently?: Yes  Does patient use assisted devices?: Yes  Assisted Devices (DME) used: Candy Cortes Shower Chair  Does patient currently own DME?: Yes  What DME does the patient currently own?: Shower Chair, Walker, Straight Cane  Does patient have a history of Outpatient Therapy (PT/OT)?: Yes  Does the patient have a history of Short-Term Rehab?: Yes  Does patient have a history of HHC?: Yes  Does patient currently have [facility]?: Yes    Current Home Health Care  Type of Current Home Care Services: Home PT, Nurse visit  [address][de-identified] Other (please enter name in comment) ([facility])  [facility] Provider[de-identified] PCP    Patient Information Continued  Income Source: Unemployed  Does patient have prescription coverage?: Yes  Within the past 12 months, you worried that your food would run out before you got the money to buy more.: Never true  Within the past 12 months, the food you bought just didn't last and you didn't have money to get more.: Never true  Food insecurity resource given?: N/A  Does patient receive dialysis treatments?: No  Does patient have a history of substance abuse?: No  Does patient have a history of Mental Health Diagnosis?: Yes  Is patient receiving treatment for mental health?: Yes  Has patient received inpatient treatment related to mental health in the last 2 years?: No         Means of Transportation  Means of Transport to Appts[de-identified] Family transport  In the past 12 months, has lack of transportation kept you from medical appointments or from getting medications?: No  In the past 12 months, has lack of transportation kept you from meetings, work, or from getting things needed for daily living?: No  Was application for public transport provided?: N/A        DISCHARGE DETAILS:    Discharge planning discussed with[de-identified] Pt spouse via phone as pt has COVID  Freedom of Choice: Yes  Comments - Freedom of Choice: discussed dc plannign and role of CM.  Spouse expressed concern for ambulatory status  CM contacted family/caregiver?: Yes  Were Treatment Team discharge recommendations reviewed with patient/caregiver?: Yes  Did patient/caregiver verbalize understanding of patient care needs?: Yes       Contacts  Patient Contacts: Allie Ayala: wife  Relationship to Patient[de-identified] Family  Contact Method: Phone  Phone Number: [phone]  Reason/Outcome: Emergency Contact, Discharge [facility]         Is the patient interested in [facility] at discharge?: Yes  [facility] requested[de-identified] Nursing, Physical [address] [facility] Name[de-identified] [facility] External Referral Reason (only applicable if external HHA name selected): Patient has established relationship with provider  [facility] Provider[de-identified] PCP  Home Health Services Needed[de-identified] Strengthening/Theraputic Exercises to Improve Function, Gait/ADL Training, Evaluate Functional Status and Safety, Other (comment) (Return of care.)  Homebound Criteria Met[de-identified] Uses an Assist Device (i.e. cane, walker, etc), Requires the Assistance of Another Person for Safe Ambulation or to Leave the Home  Supporting Clincal Findings[de-identified] Limited Endurance, Fatigues Easliy in [employer]    DME Referral Provided  Referral made for DME?: No    Other Referral/Resources/Interventions Provided:  Interventions: [facility], Short Term Rehab  Referral Comments: PT / OT to evaluate pt. Needs TBD. RADHA to [facility] care if pt is not in need of STR            Discharge Destination Plan[de-identified] Home with [address] [facility], Short Term Rehab  Transport at Discharge : Family, Wheelchair van                             IMM Given (Date):: 10/04/23  IMM Given to[de-identified] Designee  Family notified[de-identified] Lashae Thomassmith  Additional Comments: Cm spoke with pts wife via phone as pt is COVID+ and wife is POA. Pts wife reports that she and pt reside in a [facility] home with 1st floor living. Pt uses a walker to ambuatle. He also has a cane/sc/bars on toilet for assit in standing. [facility] Pt has a hx of STR with [facility] and [facility] however it would be preference not to go there again. Pt has particiapted in Op PT before. Pt has a hx of depression since his stroke. Pts wife reports that pt needs to be able to ambualate at his baseline if he is to return home as she is not able to lift him. Pt/Ot eval pending. Pt does not drive. he uses [facility] DR VALDEZ AYALA fo short term meds and express scripts for longterm medications. Pt sees [address] [place] [address] PCP.  Needs TBD

## 2023-10-05 ENCOUNTER — APPOINTMENT (INPATIENT)
Dept: MRI IMAGING | Facility: HOSPITAL | Age: 63
DRG: 871 | End: 2023-10-05
Payer: COMMERCIAL

## 2023-10-05 LAB
ANION GAP SERPL CALCULATED.3IONS-SCNC: 6 MMOL/L
BUN SERPL-MCNC: 14 MG/DL (ref 5–25)
CALCIUM SERPL-MCNC: 8.4 MG/DL (ref 8.4–10.2)
CHLORIDE SERPL-SCNC: 109 MMOL/L (ref 96–108)
CO2 SERPL-SCNC: 23 MMOL/L (ref 21–32)
CREAT SERPL-MCNC: 0.92 MG/DL (ref 0.6–1.3)
ERYTHROCYTE [DISTWIDTH] IN BLOOD BY AUTOMATED COUNT: 12.7 % (ref 11.6–15.1)
GFR SERPL CREATININE-BSD FRML MDRD: 87 ML/MIN/1.73SQ M
GLUCOSE SERPL-MCNC: 103 MG/DL (ref 65–140)
GLUCOSE SERPL-MCNC: 110 MG/DL (ref 65–140)
GLUCOSE SERPL-MCNC: 112 MG/DL (ref 65–140)
GLUCOSE SERPL-MCNC: 126 MG/DL (ref 65–140)
GLUCOSE SERPL-MCNC: 91 MG/DL (ref 65–140)
HCT VFR BLD AUTO: 34.4 % (ref 36.5–49.3)
HGB BLD-MCNC: 11.5 G/DL (ref 12–17)
MCH RBC QN AUTO: 31.3 PG (ref 26.8–34.3)
MCHC RBC AUTO-ENTMCNC: 33.4 G/DL (ref 31.4–37.4)
MCV RBC AUTO: 94 FL (ref 82–98)
PLATELET # BLD AUTO: 209 THOUSANDS/UL (ref 149–390)
PMV BLD AUTO: 10.4 FL (ref 8.9–12.7)
POTASSIUM SERPL-SCNC: 3.3 MMOL/L (ref 3.5–5.3)
PROCALCITONIN SERPL-MCNC: 0.05 NG/ML
RBC # BLD AUTO: 3.67 MILLION/UL (ref 3.88–5.62)
SODIUM SERPL-SCNC: 138 MMOL/L (ref 135–147)
WBC # BLD AUTO: 6.76 THOUSAND/UL (ref 4.31–10.16)

## 2023-10-05 PROCEDURE — 97167 OT EVAL HIGH COMPLEX 60 MIN: CPT

## 2023-10-05 PROCEDURE — 70551 MRI BRAIN STEM W/O DYE: CPT

## 2023-10-05 PROCEDURE — 85027 COMPLETE CBC AUTOMATED: CPT | Performed by: PHYSICIAN ASSISTANT

## 2023-10-05 PROCEDURE — 97163 PT EVAL HIGH COMPLEX 45 MIN: CPT

## 2023-10-05 PROCEDURE — 80048 BASIC METABOLIC PNL TOTAL CA: CPT | Performed by: PHYSICIAN ASSISTANT

## 2023-10-05 PROCEDURE — 82948 REAGENT STRIP/BLOOD GLUCOSE: CPT

## 2023-10-05 PROCEDURE — 99232 SBSQ HOSP IP/OBS MODERATE 35: CPT | Performed by: PHYSICIAN ASSISTANT

## 2023-10-05 PROCEDURE — 84145 PROCALCITONIN (PCT): CPT | Performed by: PHYSICIAN ASSISTANT

## 2023-10-05 RX ORDER — POTASSIUM CHLORIDE 20 MEQ/1
40 TABLET, EXTENDED RELEASE ORAL ONCE
Status: COMPLETED | OUTPATIENT
Start: 2023-10-05 | End: 2023-10-05

## 2023-10-05 RX ADMIN — POTASSIUM CHLORIDE 40 MEQ: 1500 TABLET, EXTENDED RELEASE ORAL at 10:55

## 2023-10-05 RX ADMIN — ASPIRIN 81 MG: 81 TABLET, COATED ORAL at 08:08

## 2023-10-05 RX ADMIN — ACETAMINOPHEN 975 MG: 325 TABLET, FILM COATED ORAL at 21:38

## 2023-10-05 RX ADMIN — TICAGRELOR 90 MG: 90 TABLET ORAL at 08:08

## 2023-10-05 RX ADMIN — DONEPEZIL HYDROCHLORIDE 10 MG: 5 TABLET, FILM COATED ORAL at 08:08

## 2023-10-05 RX ADMIN — ATORVASTATIN CALCIUM 40 MG: 40 TABLET, FILM COATED ORAL at 08:08

## 2023-10-05 RX ADMIN — CILOSTAZOL 100 MG: 50 TABLET ORAL at 17:01

## 2023-10-05 RX ADMIN — TAMSULOSIN HYDROCHLORIDE 0.4 MG: 0.4 CAPSULE ORAL at 17:01

## 2023-10-05 RX ADMIN — CILOSTAZOL 100 MG: 50 TABLET ORAL at 05:18

## 2023-10-05 RX ADMIN — GUAIFENESIN 600 MG: 600 TABLET ORAL at 08:08

## 2023-10-05 RX ADMIN — METOPROLOL SUCCINATE 100 MG: 50 TABLET, EXTENDED RELEASE ORAL at 08:07

## 2023-10-05 RX ADMIN — ENOXAPARIN SODIUM 30 MG: 100 INJECTION SUBCUTANEOUS at 08:07

## 2023-10-05 RX ADMIN — PANTOPRAZOLE SODIUM 40 MG: 40 TABLET, DELAYED RELEASE ORAL at 08:08

## 2023-10-05 RX ADMIN — ACETAMINOPHEN 975 MG: 325 TABLET, FILM COATED ORAL at 13:56

## 2023-10-05 RX ADMIN — GUAIFENESIN 600 MG: 600 TABLET ORAL at 21:38

## 2023-10-05 RX ADMIN — Medication 6 MG: at 21:38

## 2023-10-05 RX ADMIN — REMDESIVIR 100 MG: 100 INJECTION, POWDER, LYOPHILIZED, FOR SOLUTION INTRAVENOUS at 13:58

## 2023-10-05 RX ADMIN — ENOXAPARIN SODIUM 30 MG: 100 INJECTION SUBCUTANEOUS at 21:38

## 2023-10-05 RX ADMIN — TICAGRELOR 90 MG: 90 TABLET ORAL at 21:46

## 2023-10-05 RX ADMIN — LEVETIRACETAM 500 MG: 500 TABLET, FILM COATED ORAL at 21:38

## 2023-10-05 RX ADMIN — LOSARTAN POTASSIUM 50 MG: 50 TABLET, FILM COATED ORAL at 08:08

## 2023-10-05 RX ADMIN — DEXTROSE AND SODIUM CHLORIDE 100 ML/HR: 5; .9 INJECTION, SOLUTION INTRAVENOUS at 05:38

## 2023-10-05 RX ADMIN — LEVETIRACETAM 500 MG: 500 TABLET, FILM COATED ORAL at 08:08

## 2023-10-05 RX ADMIN — ACETAMINOPHEN 975 MG: 325 TABLET, FILM COATED ORAL at 05:18

## 2023-10-05 NOTE — CASE MANAGEMENT
Case Management Discharge Planning Note    Patient name Ulises Carrion  Location /-89 MRN [id number]  : 1960 Date 10/5/2023       Current Admission Date: 10/3/2023  Current Ad[facility] Diagnosis:[facility] [place] [address]   Patient Active Problem List    Diagnosis Date Noted    Sepsis due to [address]) 10/03/2023    Focal epilepsy ([address]) 2023    Claudication of both lower extremities ([address]) 2023    Type 2 diabetes mellitus with other diabetic ophthalmic complication ([address]) 487    Aphasia 2023    Atherosclerosis of native arteries of extremities with intermittent claudication, bilateral legs ([address]) 2023    Benign prostatic hyperplasia with lower urinary tract symptoms 2023    Possible NPH (normal pressure hydrocephalus) ([address]) 2023    Muscle spasms of both lower extremities 2023    Multiple thyroid nodules 2023    Abnormal laboratory test 05/15/2023    History of tobacco use 2023    Chronic ischemic left MCA stroke 2023    Hypokalemia 2023    Infrarenal abdominal aortic aneurysm (AAA) without rupture ([address]) 2023    Tachycardia 2023    Prediabetes 2023    SIRS (systemic inflammatory response syndrome) ([address]) 2023    Chronic anticoagulation 2023    Stroke ([address]) 2023    Hyponatremia 2023    Middle cerebral artery stenosis, right 2023    Left posterior MCA stroke - etiology unclear at this time 2023    Chronic low back pain 2023    Hypertensive encephalopathy, transient 2023    Snoring 08/10/2020    Memory deficits 08/10/2020    Chronic ischemic right MCA stroke 2019    Status post placement of implantable loop recorder 2019    Type 2 diabetes mellitus ([address]) 2019    Anxiety and depression 2019    Insomnia 2019    Fall 2019    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 ([address]) 2019    Urinary retention 2019    At risk for venous thromboembolism (VTE) 03/26/2019    Hypertriglyceridemia 03/26/2019    Nicotine dependence 03/26/2019    Carotid stenosis, bilateral 03/26/2019    Presbyopia 03/26/2019    History of stroke 03/19/2019    Headache 03/19/2019    Primary hypertension 03/19/2019    GERD (gastroesophageal reflux disease) 03/19/2019      LOS (days): 2  Geometric Mean LOS (GMLOS) (days): 5.10  Days to GMLOS:3     OBJECTIVE:  Risk of Unplanned Readmission Score: 28.39         Current admission status: Inpatient   Preferred Pharmacy:   [address] [place] [address]  [address] [facility] [address]  Phone: [phone] Fax: 500 [facility], [facility] [address] [address]  Phone: [phone] Fax: [phone]    Primary Care Provider: ERIC Branch    Primary Insurance: BLUE CROSS  Secondary Insurance: [facility] REP    DISCHARGE DETAILS:    Additional Comments: Call placed to Pt's wife(Faiza: [phone]), informed Pt's wife of SNF recommendation. Reviewed with Pt's wife of limited facilities due to COVID+. Pt's wife expressed understanding and agrees that Pt needs SNF. Attempted to call Pt in hospital room due to COVID +, no answer. Will need to obtain insurance auth prior to discharge. [place] SNF facilities sent via [address]

## 2023-10-05 NOTE — PLAN OF CARE
Problem: Potential for Falls  Goal: Patient will remain free of falls  Description: INTERVENTIONS:  - Educate patient/family on patient safety including physical limitations  - Instruct patient to call for assistance with activity   - Consult OT/PT to assist with strengthening/mobility   - Keep Call bell within reach  - Keep bed low and locked with side rails adjusted as appropriate  - Keep care items and personal belongings within reach  - Initiate and maintain comfort rounds  - Make Fall Risk Sign visible to staff  - Offer Toileting every 2  Problem: INFECTION - ADULT  Goal: Absence or prevention of progression during hospitalization  Description: INTERVENTIONS:  - Assess and monitor for signs and symptoms of infection  - Monitor lab/diagnostic results  - Monitor all insertion sites, i.e. indwelling lines, tubes, and drains  - Monitor endotracheal if appropriate and nasal secretions for changes in amount and color  - [place] appropriate cooling/warming therapies per order  - Administer medications as ordered  - Instruct and encourage patient and family to use good hand hygiene technique  - Identify and instruct in appropriate isolation precautions for identified infection/condition  Outcome: Progressing  Goal: Absence of fever/infection during neutropenic period  Description: INTERVENTIONS:  - Monitor WBC    Outcome: Progressing    Hours, in advance of need  - Initiate/Maintain alarm  - Obtain necessary fall risk management equipment:   - Apply yellow socks and bracelet for high fall risk patients  - Consider moving patient to room near nurses station  Outcome: Progressing

## 2023-10-05 NOTE — ASSESSMENT & PLAN NOTE
Presented to ED for cough, chest pain, SOB. Seen 10/2 in ED for 1 bloody BM which resolved. COVID 19 positive 10/3  CTA PE study obtained due to d dimer and tachycardia: No definite acute pulmonary emboli but sensitivity is decreased due to mild motion artifact. Evaluation of the lungs mildly compromised by motion with no acute disease. Stable on RA, mild pathway   ED initiated IV remdesivir, continue given high risk patient (obesity, DM2, heart disease)  SC lovenox BID per protocol for Dvt prophylaxis  Supportive care   Monitor O2, resp protocol   Suspect tachycardia is reactive  10/4 - pt with poor appetite, diaphoretic. Start IVF with NS/D5  10/5- pt appears to be improving with IVF and scheduled tylenol.   PT/OT eval pending

## 2023-10-05 NOTE — PHYSICAL THERAPY NOTE
PHYSICAL THERAPY EVALUATION  NAME: Consuelo Mcintosh  AGE:   58 y.o. MRN:  [id number]  ADMIT DX: Sinus tachycardia [R00.0]  Hypoxia [R09.02]  H/O: stroke [Z86.73]  Fever [R50.9]  COVID-19 [U07.1]    PMH:   Past Medical History:   Diagnosis Date    Depression     Diabetes mellitus ([address] )     patient denies    Dyslipidemia 2019    GERD (gastroesophageal reflux disease)     Hyperlipidemia     Hypertension     Prediabetes     Prediabetes 2019    Stroke ([address])      LENGTH OF STAY: 2       10/05/23 1453   PT Last Visit   PT Visit Date 10/05/23   Note Type   Note type Evaluation   Pain Assessment   Pain Assessment Tool 0-10   Pain Score No Pain   Hospital Pain Intervention(s) Repositioned; Ambulation/increased activity   Restrictions/Precautions   Weight Bearing Precautions Per Order No   Other Precautions Contact/isolation; Airborne/isolation;Cognitive; Chair Alarm; Bed Alarm;Multiple lines; Fall Risk  (on RA)   Home Living   Type of [address] [place] [address] One level;Stairs to enter with rails  (1 CHRIS)   Home Equipment Walker   Additional Comments Ambulates with mod I and RW at baseline. Prior Function   Level of [place] Independent with ADLs; Independent with functional mobility; Needs assistance with IADLS   Lives With Spouse   Falls in the last 6 months 0  (pt reports 0 falls)   Comments Pt is a questionable historian; most information obtained from chart. General   Additional Pertinent History h/o expressive aphasia   Family/Caregiver Present No   Cognition   Overall Cognitive Status Impaired   Arousal/Participation Cooperative   Memory Decreased recall of precautions   Following Commands Follows one step commands with increased time or repetition   Comments Pt identified by name and . Subjective   Subjective Agrees to PT evaluation and is pleasant and cooperative throughout session.    RLE Assessment   RLE Assessment X   Strength RLE   RLE Overall Strength 4-/5  (functionally)   LLE Assessment   LLE Assessment X   Strength LLE   LLE Overall Strength 4-/5  (functionally)   Vision-Basic Assessment   Visual History Other (Comment)  (h/o R visual field deficits)   Coordination   Movements are Fluid and Coordinated 0   Coordination and Movement Description ataxic   Bed Mobility   Supine to Sit Unable to assess  (OOB in chair pre/post session with alarm intact)   Transfers   Sit to Stand 4  Minimal assistance   Additional items Assist x 1; Increased time required;Verbal cues; Impulsive   Stand to Sit 4  Minimal assistance   Additional items Assist x 1; Increased time required;Verbal cues   Ambulation/Elevation   Gait pattern Improper Weight shift;Decreased foot clearance; Ataxia; Short stride; Step to;Excessively slow   Gait Assistance 4  Minimal assist   Additional items Assist x 1;Verbal cues   Assistive Device Rolling walker   Distance ~25` x1   Balance   Static Sitting Fair +   Dynamic Sitting Fair   Static Standing Fair -   Dynamic Standing Poor +   Ambulatory Poor +   Endurance Deficit   Endurance Deficit Yes   Endurance Deficit Description limited ambulation distance, fatigue   Activity Tolerance   Activity Tolerance Patient limited by fatigue   Medical Staff Made Aware OT [place]   Nurse Made Aware Per RN, pt appropriate to evaluate   Assessment   Problem List Decreased strength;Decreased endurance; Impaired balance;Decreased mobility; Decreased coordination;Decreased cognition; Impaired judgement;Decreased safety awareness; Impaired vision   Assessment Pt is a 58 y.o. male seen for PT evaluation s/p admit to [address] [place] [address] on 8/18/2022 w/ Sepsis due to COVID-19 [facility]). Order placed for PT. Comorbidities affecting pt's physical performance at time of assessment include: HTN, limited communication and CVA. Personal factors affecting pt at time of IE include: advanced age. Prior to admission, pt was was independent w/ all functional mobility w/ RW, lived in one floor environment and lived with spouse.  Upon evaluation: Pt requires min A for sit to stand, min A for ambulation with RW. (Please find full objective findings from PT assessment regarding body systems outlined above). Impairments and limitations also listed above, especially due to  weakness, impaired balance, decreased endurance, impaired coordination, gait deviations, decreased activity tolerance, decreased safety awareness and fall risk. Pt's clinical presentation is currently unstable/unpredictable seen in pt's presentation of fall risk, limited insight into deficits, and a decline in functional mobility compared to baseline. Pt to benefit from continued skilled PT tx while in hospital and upon DC to address deficits as defined above and maximize level of functional mobility. Recommend progression of ambulation and initiation of HEP as appropriate. Goals   Patient Goals to go home   STG Expiration Date 10/15/23   Short Term Goal #1 Pt will be able to: (1) perform bed mobility with supervision to promote OOB activity (2) perform sit to stand with supervision to decrease burden of care (3) ambulate at least 200` with supervision and least restrictive AD to increase activity tolerance (4) increase standing balance by 1 grade to decrease risk of falls   PT Treatment Day 0   Plan   Treatment/Interventions Functional transfer training;LE strengthening/ROM; Therapeutic exercise; Endurance training;Patient/family training;Equipment eval/education; Bed mobility;Gait training   PT Frequency 3-5x/wk   Recommendation   UB Rehab Discharge Recommendation (PT/OT) Level 2   Equipment Recommended [facility] Recommended Wheeled walker   AM-PAC Basic Mobility Inpatient   Turning in Flat Bed Without Bedrails 3   Lying on Back to Sitting on Edge of Flat Bed Without Bedrails 3   Moving Bed to Chair 3   Standing Up From Chair Using Arms 3   Walk in Room 3   Climb 3-5 Stairs With Railing 2   Basic Mobility Inpatient Raw Score 17   Basic Mobility Standardized Score 39.67   Highest Level Of Mobility   -HL Goal 5: Stand one or more mins   JH-HLM Achieved 7: Walk 25 feet or more   End of Consult   Patient Position at End of Consult Bedside chair;Bed/Chair alarm activated; All needs within reach   Pt was seen for a co-eval with OT due to potential need for significant physical assist, poor pain control, impaired mental status, limiting behaviors, and poor adherence to precautions. The patient's AM-[facility] Basic Mobility Inpatient Short Form Raw Score is 17, Standardized Score is 39.67. A Raw Score of greater than or equal to 16 suggests the patient may benefit from discharge to home. However please refer to therapist recommendation for discharge planning given other factors that may influence destination. Adapted from [employer] [facility] “6-Clicks” Basic Mobility and Daily Activity Scores With Discharge Destination. Physical Therapy, 2021;101:1-9.  DOI: [email or web address]      Tiny Cevallos PT,DPT

## 2023-10-05 NOTE — PLAN OF CARE
Problem: PHYSICAL THERAPY ADULT  Goal: Performs mobility at highest level of function for planned discharge setting. See evaluation for individualized goals. Description: Treatment/Interventions: Functional transfer training, LE strengthening/ROM, Therapeutic exercise, Endurance training, Patient/family training, Equipment eval/education, Bed mobility, Gait training  Equipment Recommended: Geetha Hedrick       See flowsheet documentation for full assessment, interventions and recommendations. Note:    Problem List: Decreased strength, Decreased endurance, Impaired balance, Decreased mobility, Decreased coordination, Decreased cognition, Impaired judgement, Decreased safety awareness, Impaired vision  Assessment: Pt is a 58 y.o. male seen for PT evaluation s/p admit to [address] [place] [address] on 8/18/2022 w/ Sepsis due to COVID-19 [facility]). Order placed for PT. Comorbidities affecting pt's physical performance at time of assessment include: HTN, limited communication and CVA. Personal factors affecting pt at time of IE include: advanced age. Prior to admission, pt was was independent w/ all functional mobility w/ RW, lived in one floor environment and lived with spouse. Upon evaluation: Pt requires min A for sit to stand, min A for ambulation with RW. (Please find full objective findings from PT assessment regarding body systems outlined above). Impairments and limitations also listed above, especially due to  weakness, impaired balance, decreased endurance, impaired coordination, gait deviations, decreased activity tolerance, decreased safety awareness and fall risk. Pt's clinical presentation is currently unstable/unpredictable seen in pt's presentation of fall risk, limited insight into deficits, and a decline in functional mobility compared to baseline. Pt to benefit from continued skilled PT tx while in hospital and upon DC to address deficits as defined above and maximize level of functional mobility.  Recommend progression of ambulation and initiation of HEP as appropriate. See flowsheet documentation for full assessment.

## 2023-10-05 NOTE — PROGRESS NOTES
[facility]  Progress Note  Name: Darshana Guillory  MRN: [id number]  Unit/Bed#: -01 I Date of Admission: 10/3/2023   Date of Service: 10/5/2023 I Hospital Day: 2    Assessment/Plan   * Sepsis due to COVID-19 [facility])  Assessment & Plan  Presented to ED for cough, chest pain, SOB. Seen 10/2 in ED for 1 bloody BM which resolved. COVID 19 positive 10/3  CTA PE study obtained due to d dimer and tachycardia: No definite acute pulmonary emboli but sensitivity is decreased due to mild motion artifact. Evaluation of the lungs mildly compromised by motion with no acute disease. Stable on RA, mild pathway   ED initiated IV remdesivir, continue given high risk patient (obesity, DM2, heart disease)  SC lovenox BID per protocol for Dvt prophylaxis  Supportive care   Monitor O2, resp protocol   Suspect tachycardia is reactive  10/4 - pt with poor appetite, diaphoretic. Start IVF with NS/D5  10/5- pt appears to be improving with IVF and scheduled tylenol. PT/OT eval pending    Atherosclerosis of native arteries of extremities with intermittent claudication, bilateral legs (HCC)  Assessment & Plan  Continue pletal    Chronic ischemic left MCA stroke  Assessment & Plan  status post left carotid stent, right carotid stenosis, had a loop recorder placed for 3 years which did not show any evidence of A-fib   Continue home ASA, brilinta, statin   Mild aphasia at baseline   Follows with neurology outpatient   Currently on keppra 500 mg BID due to "zoning out spells" which are possible but not confirmed seizures. Patient had outpatient MRI scheduled due to concern for recurrent CVA, missed appointment due to hospitalization. Likely discharge to rehab. We will attempt to obtain while inpatient as to not delay care.   Seizure precautions     Prediabetes  Assessment & Plan  No longer on metformin  ISS and accucheks  DM diet      Primary hypertension  Assessment & Plan  Continue home losartan and metoprolol               VTE Pharmacologic Prophylaxis: VTE Score: 4 Moderate Risk (Score 3-4) - Pharmacological DVT Prophylaxis Ordered: enoxaparin (Lovenox). Patient Centered Rounds: I performed bedside rounds with nursing staff today. Discussions with Specialists or Other Care Team Provider: None    Education and Discussions with Family / Patient: Attempted to update  (wife) via phone. Left voicemail. Total Time Spent on Date of Encounter in care of patient: 60 mins. This time was spent on one or more of the following: performing physical exam; counseling and coordination of care; obtaining or reviewing history; documenting in the medical record; reviewing/ordering tests, medications or procedures; communicating with other healthcare professionals and discussing with patient's family/caregivers. Current Length of Stay: 2 day(s)  Current Patient Status: Inpatient   Certification Statement: The patient will continue to require additional inpatient hospital stay due to weakness  Discharge Plan: Anticipate discharge tomorrow to discharge location to be determined pending rehab evaluations. Code Status: Level 1 - Full Code    Subjective:   No acute events overnight. Patient reports feeling a little better today. Objective:     Vitals:   Temp (24hrs), Av.8 °F (37.1 °C), Min:98.1 °F (36.7 °C), Max:99.4 °F (37.4 °C)    Temp:  [98.1 °F (36.7 °C)-99.4 °F (37.4 °C)] 98.1 °F (36.7 °C)  HR:  [77-96] 77  Resp:  [18-20] 18  BP: (143-159)/(72-86) 143/76  SpO2:  [94 %-97 %] 96 %  There is no height or weight on file to calculate BMI. Input and Output Summary (last 24 hours): Intake/Output Summary (Last 24 hours) at 10/5/2023 1144  Last data filed at 10/5/2023 0916  Gross per 24 hour   Intake 1540 ml   Output 779 ml   Net 761 ml       Physical Exam:   Physical Exam  Vitals and nursing note reviewed. HENT:      Head: Normocephalic and atraumatic.       Mouth/Throat:      Mouth: Mucous membranes are moist.      Pharynx: Oropharynx is clear. No oropharyngeal exudate. Eyes:      Extraocular Movements: Extraocular movements intact. Cardiovascular:      Rate and Rhythm: Normal rate and regular rhythm. Pulses: Normal pulses. Heart sounds: Normal heart sounds. No murmur heard. No friction rub. No gallop. Pulmonary:      Effort: Pulmonary effort is normal. No respiratory distress. Breath sounds: Normal breath sounds. No stridor. No wheezing or rales. Abdominal:      General: Abdomen is flat. Bowel sounds are normal. There is no distension. Palpations: Abdomen is soft. Tenderness: There is no abdominal tenderness. Musculoskeletal:      Right lower leg: No edema. Left lower leg: No edema. Skin:     General: Skin is warm and dry. Neurological:      General: No focal deficit present. Mental Status: He is alert and oriented to person, place, and time.           Additional Data:     Labs:  Results from last 7 days   Lab Units 10/05/23  0536 10/04/23  0237   WBC Thousand/uL 6.76 6.22   HEMOGLOBIN g/dL 11.5* 11.5*   HEMATOCRIT % 34.4* 34.8*   PLATELETS Thousands/uL 209 202   NEUTROS PCT %  --  65   LYMPHS PCT %  --  15   MONOS PCT %  --  19*   EOS PCT %  --  0     Results from last 7 days   Lab Units 10/05/23  0536 10/04/23  0237   SODIUM mmol/L 138 135   POTASSIUM mmol/L 3.3* 3.3*   CHLORIDE mmol/L 109* 105   CO2 mmol/L 23 22   BUN mg/dL 14 15   CREATININE mg/dL 0.92 0.94   ANION GAP mmol/L 6 8   CALCIUM mg/dL 8.4 8.8   ALBUMIN g/dL  --  3.8   TOTAL BILIRUBIN mg/dL  --  0.49   ALK PHOS U/L  --  74   ALT U/L  --  14   AST U/L  --  21   GLUCOSE RANDOM mg/dL 110 84     Results from last 7 days   Lab Units 10/03/23  0739   INR  1.08     Results from last 7 days   Lab Units 10/05/23  0743 10/04/23  1952 10/04/23  1610 10/04/23  1219 10/04/23  0751 10/03/23  2049 10/03/23  1651   POC GLUCOSE mg/dl 103 99 75 79 86 90 91         Results from last 7 days   Lab Units 10/05/23  0536 10/03/23  0739   LACTIC ACID mmol/L  --  1.4   PROCALCITONIN ng/ml 0.05 0.07       Lines/Drains:  Invasive Devices       Peripheral Intravenous Line  Duration             Peripheral IV 10/03/23 Left Antecubital 2 days    Peripheral IV 10/04/23 Right;Ventral (anterior) Forearm <1 day                          Imaging: No pertinent imaging reviewed. Recent Cultures (last 7 days):   Results from last 7 days   Lab Units 10/03/23  0739   BLOOD CULTURE  No Growth at 24 hrs. No Growth at 24 hrs.        Last 24 Hours Medication List:   Current Facility-Administered Medications   Medication Dose Route Frequency Provider Last Rate    acetaminophen  975 mg Oral Q8H [facility], [place]      aspirin  81 mg Oral Daily Manjinder Raza PA-C      atorvastatin  40 mg Oral Daily With Breakfast Manjinder Aldrich PA-C      baclofen  5 mg Oral BID PRN Manjinder Aldrich PA-C      cilostazol  100 mg Oral BID AC Sepideh Aldrich PA-C      dextrose 5 % and sodium chloride 0.9 %  100 mL/hr Intravenous Continuous Sharon Parson PA-C 100 mL/hr (10/05/23 0538)    donepezil  10 mg Oral Daily Manjinder Aldrich PA-C      enoxaparin  30 mg Subcutaneous Q12H [address] Sepideh Raza PA-C      guaiFENesin  600 mg Oral Q12H [address] Sepideh Aldrich PA-C      insulin lispro  1-5 Units Subcutaneous HS Manjinder Aldrich PA-C      insulin lispro  1-6 Units Subcutaneous TID AC Sepideh Aldrich PA-C      levETIRAcetam  500 mg Oral Q12H [address] Sepideh Aldrich PA-C      losartan  50 mg Oral Daily Erich Gaines PA-C      melatonin  6 mg Oral HS Sepideh Aldrich PA-C      metoprolol succinate  100 mg Oral Daily LUANN Lackey-MARIELA      pantoprazole  40 mg Oral Daily Manjinder Raza, [place]      remdesivir  100 mg Intravenous Q24H Manjinder Aldrich PA-C      tamsulosin  0.4 mg Oral Daily With Dinner María Antoine      ticagrelor  90 mg Oral Q12H [address] [place] Marodrigue Darnellbojun [facility]          Today, Patient Was Seen By: Clara Krabbe, PA-C    **Please Note: This note may have been constructed using a voice recognition system. **

## 2023-10-05 NOTE — OCCUPATIONAL THERAPY NOTE
Occupational Therapy Evaluation     Patient Name: Scottie Sandhu  NSDPY'B Date: 10/5/2023  Problem List  Principal Problem:    Sepsis due to COVID-19 [facility])  Active Problems:    Primary hypertension    Prediabetes    Chronic ischemic left MCA stroke    Atherosclerosis of native arteries of extremities with intermittent claudication, bilateral legs [facility])    Past Medical History  Past Medical History:   Diagnosis Date    Depression     Diabetes mellitus ([address])     patient denies    Dyslipidemia 03/26/2019    GERD (gastroesophageal reflux disease)     Hyperlipidemia     Hypertension     Prediabetes     Prediabetes 04/03/2019    Stroke [facility])      Past Surgical History  Past Surgical History:   Procedure Laterality Date    ARTHROSCOPIC REPAIR ACL Left     BACK SURGERY      twice    CARPAL TUNNEL RELEASE Right     IR CEREBRAL ANGIOGRAPHY  05/04/2023    IR STROKE ALERT  03/19/2019    SHOULDER SURGERY Left          10/05/23 1455   OT Last Visit   OT Visit Date 10/05/23   Note Type   Note type Evaluation   Pain Assessment   Pain Assessment Tool 0-10   Pain Score No Pain   Hospital Pain Intervention(s) Repositioned; Rest   Restrictions/Precautions   Weight Bearing Precautions Per Order No   Other Precautions Fall Risk;Contact/isolation;Cognitive; Chair Alarm; Bed Alarm   Home Living   Type of [address] [place] [address] One level;Stairs to enter with rails   [place]   Prior Function   Level of [address] [place] [address] Street with ADLs; Independent with functional mobility; Needs assistance with IADLS  (Reports that he uses a RW at baseline)   Lives With Spouse   Falls in the last 6 months 0   Comments Pt questionable historian   Subjective   Subjective Pt received seated in recliner.  Pt agreeable to session   ADL   Eating Assistance 7  Independent   Grooming Assistance 7  Independent   UB Bathing Assistance 5  Supervision/Setup   LB Bathing Assistance 5  Supervision/Setup   UB Dressing Assistance 5  Supervision/Setup   LB Dressing Assistance 4  Minimal Assistance   Toileting Assistance  4  Minimal Assistance   Bed Mobility   Additional Comments Pt received seated in recliner   Transfers   Sit to Stand 4  Minimal assistance   Additional items Assist x 1;Verbal cues   Stand to Sit 4  Minimal assistance   Additional items Assist x 1;Verbal cues   Functional Mobility   Functional Mobility 4  Minimal assistance   Additional Comments x 1, RW. + ataxia t/o   Balance   Static Sitting Fair +   Dynamic Sitting Fair   Static Standing Fair -   Dynamic Standing Poor +   Activity Tolerance   Activity Tolerance Patient limited by fatigue   Medical Staff Made Aware PT Margo Julian   Nurse Made Aware RN   RUE Assessment   RUE Assessment WFL   LUE Assessment   LUE Assessment WFL   Vision-Basic Assessment   Visual History   (h/o of R visual field cut)   Cognition   Overall Cognitive Status Impaired   Arousal/Participation Alert   Attention Attends with cues to redirect   Orientation Level Oriented to person;Oriented to place;Oriented to situation   Memory Decreased recall of precautions   Following Commands Follows one step commands with increased time or repetition   Comments + dysarthria   Assessment   Limitation Decreased ADL status; Decreased self-care trans;Decreased high-level ADLs   Prognosis Fair   Assessment Pt is a 58 y.o. male seen for OT evaluation at [facility], admitted 10/3/2023 w/ Sepsis due to COVID-19 [facility]). Comorbidities affecting pt's functional performance at time of assessment include: left MCA, DM2, HTN, HLD, bilateral carotid stenosis, bilateral LE claudication . Personal factors affecting pt at time of IE include:difficulty performing ADLS, difficulty performing IADLS , and decreased functional mobility . Prior to admission, pt was living with wife in house with 1st floor set-up. Pt was I w/  ADLS and required assist with IADLS, & required RW PTA.  Upon evaluation: Pt requires min Ax 1 for functional mobility/transfers, sup for UB ADLs and min A for LB ADLS 2* the following deficits impacting occupational performance: weakness, decreased strength, decreased balance, and decreased tolerance. Full objective findings from OT assessment regarding body systems outlined above. These impairments, as well as pt's decreased caregiver support and risk for falls  limit pt's ability to safely engage in all baseline areas of occupation and mobility. Pt to benefit from continued skilled OT tx while in the hospital to address deficits as defined above and maximize level of functional independence w ADL's and functional mobility. Occupational Performance areas to address include: bathing/shower, toilet hygiene, dressing, and functional mobility. This evaluation required an extensive review of medical and/or therapy records and additional review of physical, cognitive and psychosocial history related to functional performance. Based upon functional performance deficits and assessments, this evaluation has been identified as a high complexity evaluation. The patient's raw score on the AM-PAC Daily Activity inpatient short form is 18, standardized score is 38.66, less than 39.4. Patients at this level are likely to benefit from DC to home. However please refer to therapist recommendation for discharge planning given other factors that may influence destination. At this time, OT recommendations at time of discharge are level 2 mod intensity resources. Goals   Patient Goals Pt wishes to get home   Plan   Treatment Interventions ADL retraining;Functional transfer training;Patient/family training; Compensatory technique education;Equipment evaluation/education   Goal Expiration Date 10/15/23   OT Treatment Day 0   OT Frequency 3-5x/wk   Recommendation   UB Rehab Discharge Recommendation (PT/OT) Level 2   AM-PAC Daily Activity Inpatient   Lower Body Dressing 2   Bathing 2   Toileting 2   Upper Body Dressing 4   Grooming 4   Eating 4   Daily Activity Raw Score 18   Daily Activity Standardized Score (Calc for Raw Score >=11) 38.66   AM-PAC Applied Cognition Inpatient   Following a Speech/Presentation 3   Understanding Ordinary Conversation 4   Taking Medications 3   Remembering Where Things Are Placed or Put Away 3   Remembering List of 4-5 Errands 3   Taking Care of Complicated Tasks 2   Applied Cognition Raw Score 18   Applied Cognition Standardized Score 38.07   End of Consult   Education Provided Yes   Patient Position at End of Consult Bedside chair;Bed/Chair alarm activated; All needs within reach   Nurse Communication Nurse aware of consult           Pt will achieve the following goals within 10 days. *Pt will complete UB bathing and dressing with mod I.    *Pt will complete LB bathing and dressing with mod I .    * Pt will complete toileting w/ mod I w/ G hygiene/thoroughness using DME PRN    *Pt will complete bed mobility with mod I, with bed flat and no side rail to prep for purposeful tasks    *Pt will perform functional transfers with on/off all surfaces with mod I using DME as needed w/ G balance/safety. *Patient will verbalize 3 safety awareness/ principles to prevent falls in the home setting. *Pt will improve functional mobility during ADL/IADL/leisure tasks to mod I using DME as needed w/ G balance/safety.        Fatmata Hurley, OTR/L

## 2023-10-05 NOTE — PLAN OF CARE
Problem: OCCUPATIONAL THERAPY ADULT  Goal: Performs self-care activities at highest level of function for planned discharge setting. See evaluation for individualized goals. Description: Treatment Interventions: ADL retraining, Functional transfer training, Patient/family training, Compensatory technique education, Equipment evaluation/education          See flowsheet documentation for full assessment, interventions and recommendations. Note: Limitation: Decreased ADL status, Decreased self-care trans, Decreased high-level ADLs  Prognosis: Fair  Assessment: Pt is a 58 y.o. male seen for OT evaluation at [facility], admitted 10/3/2023 w/ Sepsis due to COVID-19 [facility]). Comorbidities affecting pt's functional performance at time of assessment include: left MCA, DM2, HTN, HLD, bilateral carotid stenosis, bilateral LE claudication . Personal factors affecting pt at time of IE include:difficulty performing ADLS, difficulty performing IADLS , and decreased functional mobility . Prior to admission, pt was living with wife in house with 1st floor set-up. Pt was I w/  ADLS and required assist with IADLS, & required RW PTA. Upon evaluation: Pt requires min Ax 1 for functional mobility/transfers, sup for UB ADLs and min A for LB ADLS 2* the following deficits impacting occupational performance: weakness, decreased strength, decreased balance, and decreased tolerance. Full objective findings from OT assessment regarding body systems outlined above. These impairments, as well as pt's decreased caregiver support and risk for falls  limit pt's ability to safely engage in all baseline areas of occupation and mobility. Pt to benefit from continued skilled OT tx while in the hospital to address deficits as defined above and maximize level of functional independence w ADL's and functional mobility. Occupational Performance areas to address include: bathing/shower, toilet hygiene, dressing, and functional mobility. This evaluation required an extensive review of medical and/or therapy records and additional review of physical, cognitive and psychosocial history related to functional performance. Based upon functional performance deficits and assessments, this evaluation has been identified as a high complexity evaluation. The patient's raw score on the AM-PAC Daily Activity inpatient short form is 18, standardized score is 38.66, less than 39.4. Patients at this level are likely to benefit from DC to home. However please refer to therapist recommendation for discharge planning given other factors that may influence destination. At this time, OT recommendations at time of discharge are level 2 mod intensity resources.

## 2023-10-05 NOTE — PLAN OF CARE
Problem: Potential for Falls  Goal: Patient will remain free of falls  Description: INTERVENTIONS:  - Educate patient/family on patient safety including physical limitations  - Instruct patient to call for assistance with activity   - Consult OT/PT to assist with strengthening/mobility   - Keep Call bell within reach  - Keep bed low and locked with side rails adjusted as appropriate  - Keep care items and personal belongings within reach  - Initiate and maintain comfort rounds  - Make Fall Risk Sign visible to staff  - Offer Toileting every x Hours, in advance of need  - Initiate/Maintain xalarm  - Obtain necessary fall risk management equipment: x  - Apply yellow socks and bracelet for high fall risk patients  - Consider moving patient to room near nurses station  Outcome: Progressing     Problem: MOBILITY - ADULT  Goal: Maintain or return to baseline ADL function  Description: INTERVENTIONS:  -  Assess patient's ability to carry out ADLs; assess patient's baseline for ADL function and identify physical deficits which impact ability to perform ADLs (bathing, care of mouth/teeth, toileting, grooming, dressing, etc.)  - Assess/evaluate cause of self-care deficits   - Assess range of motion  - Assess patient's mobility; develop plan if impaired  - Assess patient's need for assistive devices and provide as appropriate  - Encourage maximum independence but intervene and supervise when necessary  - Involve family in performance of ADLs  - Assess for home care needs following discharge   - Consider OT consult to assist with ADL evaluation and planning for discharge  - Provide patient education as appropriate  Outcome: Progressing  Goal: Maintains/Returns to pre admission functional level  Description: INTERVENTIONS:  - Perform BMAT or MOVE assessment daily.   - Set and communicate daily mobility goal to care team and patient/family/caregiver.    - Collaborate with rehabilitation services on mobility goals if consulted  - Perform Range of Motion x times a day. - Reposition patient every x hours.   - Dangle patient x times a day  - Stand patient x times a day  - Ambulate patient x times a day  - Out of bed to chair x times a day   - Out of bed for meals x times a day  - Out of bed for toileting  - Record patient progress and toleration of activity level   Outcome: Progressing     Problem: INFECTION - ADULT  Goal: Absence or prevention of progression during hospitalization  Description: INTERVENTIONS:  - Assess and monitor for signs and symptoms of infection  - Monitor lab/diagnostic results  - Monitor all insertion sites, i.e. indwelling lines, tubes, and drains  - Monitor endotracheal if appropriate and nasal secretions for changes in amount and color  - [place] appropriate cooling/warming therapies per order  - Administer medications as ordered  - Instruct and encourage patient and family to use good hand hygiene technique  - Identify and instruct in appropriate isolation precautions for identified infection/condition  Outcome: Progressing  Goal: Absence of fever/infection during neutropenic period  Description: INTERVENTIONS:  - Monitor WBC    Outcome: Progressing     Problem: Prexisting or High Potential for Compromised Skin Integrity  Goal: Skin integrity is maintained or improved  Description: INTERVENTIONS:  - Identify patients at risk for skin breakdown  - Assess and monitor skin integrity  - Assess and monitor nutrition and hydration status  - Monitor labs   - Assess for incontinence   - Turn and reposition patient  - Assist with mobility/ambulation  - Relieve pressure over bony prominences  - Avoid friction and shearing  - Provide appropriate hygiene as needed including keeping skin clean and dry  - Evaluate need for skin moisturizer/barrier cream  - Collaborate with interdisciplinary team   - Patient/family teaching  - Consider wound care consult   Outcome: Progressing

## 2023-10-06 LAB
ANION GAP SERPL CALCULATED.3IONS-SCNC: 8 MMOL/L
BASOPHILS # BLD AUTO: 0.04 THOUSANDS/ÂΜL (ref 0–0.1)
BASOPHILS NFR BLD AUTO: 1 % (ref 0–1)
BUN SERPL-MCNC: 14 MG/DL (ref 5–25)
CALCIUM SERPL-MCNC: 8.7 MG/DL (ref 8.4–10.2)
CHLORIDE SERPL-SCNC: 110 MMOL/L (ref 96–108)
CO2 SERPL-SCNC: 21 MMOL/L (ref 21–32)
CREAT SERPL-MCNC: 0.89 MG/DL (ref 0.6–1.3)
EOSINOPHIL # BLD AUTO: 0.38 THOUSAND/ÂΜL (ref 0–0.61)
EOSINOPHIL NFR BLD AUTO: 6 % (ref 0–6)
ERYTHROCYTE [DISTWIDTH] IN BLOOD BY AUTOMATED COUNT: 12.9 % (ref 11.6–15.1)
GFR SERPL CREATININE-BSD FRML MDRD: 90 ML/MIN/1.73SQ M
GLUCOSE SERPL-MCNC: 117 MG/DL (ref 65–140)
GLUCOSE SERPL-MCNC: 85 MG/DL (ref 65–140)
GLUCOSE SERPL-MCNC: 86 MG/DL (ref 65–140)
GLUCOSE SERPL-MCNC: 92 MG/DL (ref 65–140)
GLUCOSE SERPL-MCNC: 97 MG/DL (ref 65–140)
GLUCOSE SERPL-MCNC: 97 MG/DL (ref 65–140)
HCT VFR BLD AUTO: 36.1 % (ref 36.5–49.3)
HGB BLD-MCNC: 11.9 G/DL (ref 12–17)
IMM GRANULOCYTES # BLD AUTO: 0.03 THOUSAND/UL (ref 0–0.2)
IMM GRANULOCYTES NFR BLD AUTO: 1 % (ref 0–2)
LYMPHOCYTES # BLD AUTO: 1.83 THOUSANDS/ÂΜL (ref 0.6–4.47)
LYMPHOCYTES NFR BLD AUTO: 30 % (ref 14–44)
MCH RBC QN AUTO: 31 PG (ref 26.8–34.3)
MCHC RBC AUTO-ENTMCNC: 33 G/DL (ref 31.4–37.4)
MCV RBC AUTO: 94 FL (ref 82–98)
MONOCYTES # BLD AUTO: 0.71 THOUSAND/ÂΜL (ref 0.17–1.22)
MONOCYTES NFR BLD AUTO: 12 % (ref 4–12)
NEUTROPHILS # BLD AUTO: 3.2 THOUSANDS/ÂΜL (ref 1.85–7.62)
NEUTS SEG NFR BLD AUTO: 50 % (ref 43–75)
NRBC BLD AUTO-RTO: 0 /100 WBCS
PLATELET # BLD AUTO: 213 THOUSANDS/UL (ref 149–390)
PMV BLD AUTO: 10.1 FL (ref 8.9–12.7)
POTASSIUM SERPL-SCNC: 3.5 MMOL/L (ref 3.5–5.3)
RBC # BLD AUTO: 3.84 MILLION/UL (ref 3.88–5.62)
SODIUM SERPL-SCNC: 139 MMOL/L (ref 135–147)
WBC # BLD AUTO: 6.19 THOUSAND/UL (ref 4.31–10.16)

## 2023-10-06 PROCEDURE — 97112 NEUROMUSCULAR REEDUCATION: CPT

## 2023-10-06 PROCEDURE — 99233 SBSQ HOSP IP/OBS HIGH 50: CPT | Performed by: PHYSICIAN ASSISTANT

## 2023-10-06 PROCEDURE — 80048 BASIC METABOLIC PNL TOTAL CA: CPT | Performed by: STUDENT IN AN ORGANIZED HEALTH CARE EDUCATION/TRAINING PROGRAM

## 2023-10-06 PROCEDURE — 97116 GAIT TRAINING THERAPY: CPT

## 2023-10-06 PROCEDURE — 97530 THERAPEUTIC ACTIVITIES: CPT

## 2023-10-06 PROCEDURE — 85025 COMPLETE CBC W/AUTO DIFF WBC: CPT | Performed by: STUDENT IN AN ORGANIZED HEALTH CARE EDUCATION/TRAINING PROGRAM

## 2023-10-06 PROCEDURE — 82948 REAGENT STRIP/BLOOD GLUCOSE: CPT

## 2023-10-06 PROCEDURE — 99255 IP/OBS CONSLTJ NEW/EST HI 80: CPT | Performed by: PSYCHIATRY & NEUROLOGY

## 2023-10-06 RX ADMIN — TAMSULOSIN HYDROCHLORIDE 0.4 MG: 0.4 CAPSULE ORAL at 17:41

## 2023-10-06 RX ADMIN — LEVETIRACETAM 500 MG: 500 TABLET, FILM COATED ORAL at 22:03

## 2023-10-06 RX ADMIN — LOSARTAN POTASSIUM 50 MG: 50 TABLET, FILM COATED ORAL at 09:33

## 2023-10-06 RX ADMIN — METOPROLOL SUCCINATE 100 MG: 50 TABLET, EXTENDED RELEASE ORAL at 09:33

## 2023-10-06 RX ADMIN — TICAGRELOR 90 MG: 90 TABLET ORAL at 09:39

## 2023-10-06 RX ADMIN — LEVETIRACETAM 500 MG: 500 TABLET, FILM COATED ORAL at 09:33

## 2023-10-06 RX ADMIN — ENOXAPARIN SODIUM 30 MG: 100 INJECTION SUBCUTANEOUS at 09:33

## 2023-10-06 RX ADMIN — GUAIFENESIN 600 MG: 600 TABLET ORAL at 22:02

## 2023-10-06 RX ADMIN — PANTOPRAZOLE SODIUM 40 MG: 40 TABLET, DELAYED RELEASE ORAL at 09:33

## 2023-10-06 RX ADMIN — CILOSTAZOL 100 MG: 50 TABLET ORAL at 06:15

## 2023-10-06 RX ADMIN — DONEPEZIL HYDROCHLORIDE 10 MG: 5 TABLET, FILM COATED ORAL at 09:33

## 2023-10-06 RX ADMIN — CILOSTAZOL 100 MG: 50 TABLET ORAL at 17:41

## 2023-10-06 RX ADMIN — REMDESIVIR 100 MG: 100 INJECTION, POWDER, LYOPHILIZED, FOR SOLUTION INTRAVENOUS at 12:37

## 2023-10-06 RX ADMIN — ACETAMINOPHEN 975 MG: 325 TABLET, FILM COATED ORAL at 22:02

## 2023-10-06 RX ADMIN — ACETAMINOPHEN 975 MG: 325 TABLET, FILM COATED ORAL at 06:15

## 2023-10-06 RX ADMIN — ASPIRIN 81 MG: 81 TABLET, COATED ORAL at 09:33

## 2023-10-06 RX ADMIN — Medication 6 MG: at 22:03

## 2023-10-06 RX ADMIN — TICAGRELOR 90 MG: 90 TABLET ORAL at 22:22

## 2023-10-06 RX ADMIN — GUAIFENESIN 600 MG: 600 TABLET ORAL at 09:33

## 2023-10-06 RX ADMIN — ATORVASTATIN CALCIUM 40 MG: 40 TABLET, FILM COATED ORAL at 09:39

## 2023-10-06 RX ADMIN — ENOXAPARIN SODIUM 30 MG: 100 INJECTION SUBCUTANEOUS at 22:03

## 2023-10-06 NOTE — ASSESSMENT & PLAN NOTE
Acute to subacute infarct of parietal temporal region, POA, in patient with known chronic stroke evidenced by MRI   MRI brain; Few punctate foci of restricted diffusion within the chronic left MCA territory infarct in the parietal temporal region, possibly indicating acute to subacute infarcts. No mass effect or hemorrhagic conversion.   Continue Keppra and seizure precautions  Neuro consulted and no further inpatient workup recommended, continue triple anti platelet therapy plus statin  Follow up with stroke clinic as outpatient

## 2023-10-06 NOTE — ASSESSMENT & PLAN NOTE
status post left carotid stent, right carotid stenosis, had a loop recorder placed for 3 years which did not show any evidence of A-fib   MRI brain here shows evidence of additional stroke in chronic stroke bed   Neuro consulted, advised continuing triple anti platelet therapy  Follows with neurology outpatient   Currently on keppra 500 mg BID due to "zoning out spells" which are possible but not confirmed seizures.    Follow up with Epilepsy AP on 12/12 is scheduled   Will also follow up in stroke clinic

## 2023-10-06 NOTE — CASE MANAGEMENT
[address] [facility] [address] received request for transport authorization from Care Manager.    Type of transport: BLS  Date of transport: 10/6  Transport company:  [employer]   NPI: [id number]  Start Location: [facility]  End Location: [facility]  [facility] Necessity: COVID+, isolation and assist with transfers  Authorization initiated by contacting: Liliana Gipson [facility] Via: Fax ([phone])

## 2023-10-06 NOTE — QUICK NOTE
MRI brain with acute to subacute CVA. Patient being worked up for recurrent stroke in the outpatient setting, he is already on aspirin, Brilinta, statin. He has chronic mild aphasia at baseline. He is sleeping presently. We will start on telemetry monitoring and place a neurology consult while hospitalized.     Verónica Cueto PA-C

## 2023-10-06 NOTE — PHYSICAL THERAPY NOTE
PHYSICAL THERAPY NOTE    Patient Name: Luis Felipe Mays  BXGAY'O Date: 10/6/2023       10/06/23 1146   PT Last Visit   PT Visit Date 10/06/23   Note Type   Note Type Treatment   Pain Assessment   Pain Assessment Tool 0-10   Pain Score No Pain   Restrictions/Precautions   Weight Bearing Precautions Per Order No   Other Precautions Fall Risk;Contact/isolation; Airborne/isolation;Cognitive; Chair Alarm; Bed Alarm   General   Chart Reviewed Yes   Additional Pertinent History per chart: MRI brain with acute to subacute CVA; h/o expressive aphasia   Family/Caregiver Present No   Cognition   Overall Cognitive Status Impaired   Arousal/Participation Alert   Attention Attends with cues to redirect   Comments Pt identified by name and . Subjective   Subjective Agrees to PT treatment and is pleasant and cooperative throughout session. Bed Mobility   Supine to Sit Unable to assess  (OOB in chair pre/post session with alarm intact)   Transfers   Sit to Stand 4  Minimal assistance   Additional items Assist x 1;Verbal cues; Increased time required   Stand to Sit 4  Minimal assistance   Additional items Assist x 1; Increased time required;Verbal cues   Ambulation/Elevation   Gait pattern Improper Weight shift;Decreased foot clearance; Ataxia; Short stride; Step to;Excessively slow   Gait Assistance 4  Minimal assist   Additional items Assist x 1;Verbal cues   Assistive Device Rolling walker   Distance ~25`x2 + ~30` x1   Balance   Static Sitting Fair +   Dynamic Sitting Fair   Static Standing Fair -   Dynamic Standing Poor +   Ambulatory Poor +   Endurance Deficit   Endurance Deficit Yes   Endurance Deficit Description limited ambulation distance, fatigue, SOB with mobility this session   Activity Tolerance   Activity Tolerance Patient limited by fatigue   Exercises   Knee AROM Long Arc Quad Sitting;20 reps;AROM; Bilateral   Marching Standing;20 reps;AROM; Bilateral   Neuro re-ed standing toe touch to target with UE support on RW   Assessment   Problem List Decreased strength;Decreased endurance; Impaired balance;Decreased mobility; Decreased coordination;Decreased cognition; Impaired judgement;Decreased safety awareness; Impaired vision   Assessment Pt agrees to PT treatment and is cooperative throughout session. Progress noted as pt is able to ambulate increased distance, however requires min A with use of RW. Continues to have difficulty navigating around obstacles on R side most likely due to R sided visual deficits at baseline. Requires assist for RW negotiation at time. Pt continues to have RLE deviations with over-shooting step length and pre-mature extension of RLE knee causing an ataxic gait. Poor RW proximity noted and requires short standing rest breaks due to TURNER. Extended seated rest breaks required between all ambulation trials. Will continue to benefit from ongoing skilled PT to maximize his functional mobility and increase his level of independence. Goals   Patient Goals to go home   STG Expiration Date 10/15/23   PT Treatment Day 1   Plan   Treatment/Interventions Functional transfer training;LE strengthening/ROM; Therapeutic exercise; Endurance training;Patient/family training;Equipment eval/education; Bed mobility;Gait training   Progress Progressing toward goals   PT Frequency 3-5x/wk   Recommendation   UB Rehab Discharge Recommendation (PT/OT) Level 2   Equipment Recommended [facility] Recommended Wheeled walker   AM-PAC Basic Mobility Inpatient   Turning in Flat Bed Without Bedrails 3   Lying on Back to Sitting on Edge of Flat Bed Without Bedrails 3   Moving Bed to Chair 3   Standing Up From Chair Using Arms 3   Walk in Room 3   Climb 3-5 Stairs With Railing 2   Basic Mobility Inpatient Raw Score 17   Basic Mobility Standardized Score 39.67   Highest Level Of Mobility   JH-HLM Goal 5: Stand one or more mins   JH-HLM Achieved 7: Walk 25 feet or more   Education   Education Provided Mobility training;Assistive device   Patient Reinforcement needed   End of Consult   Patient Position at End of Consult Bedside chair;Bed/Chair alarm activated; All needs within reach     The patient's AM-PAC Basic Mobility Inpatient Short Form Raw Score is 17. A Raw score of greater than 16 suggests the patient may benefit from discharge to home. However please refer to therapist recommendation for discharge planning given other factors that may influence destination. Adapted from [facility] “6-Clicks” Basic Mobility and Daily Activity Scores With Discharge Destination. Physical Therapy, 2021;101:1-9.  DOI: 10.1093/[email or web address]    Dieudonne Ohara

## 2023-10-06 NOTE — CASE MANAGEMENT
[address] received request for authorization from Care Manager.   Authorization request for: SNF  Facility Name: [facility] NPI: [id number]  Facility MD: Dr. Sophie Dent: [id number]  Authorization initiated by contacting insurance: Zeyad Magallanes Via: Fax  Pending Reference #:  Clinicals submitted via: Fax ([phone] & [phone])

## 2023-10-06 NOTE — PLAN OF CARE
Problem: PHYSICAL THERAPY ADULT  Goal: Performs mobility at highest level of function for planned discharge setting. See evaluation for individualized goals. Description: Treatment/Interventions: Functional transfer training, LE strengthening/ROM, Therapeutic exercise, Endurance training, Patient/family training, Equipment eval/education, Bed mobility, Gait training  Equipment Recommended: Ofelia Garcia       See flowsheet documentation for full assessment, interventions and recommendations. Outcome: Progressing  Note:    Problem List: Decreased strength, Decreased endurance, Impaired balance, Decreased mobility, Decreased coordination, Decreased cognition, Impaired judgement, Decreased safety awareness, Impaired vision  Assessment: Pt agrees to PT treatment and is cooperative throughout session. Progress noted as pt is able to ambulate increased distance, however requires min A with use of RW. Continues to have difficulty navigating around obstacles on R side most likely due to R sided visual deficits at baseline. Requires assist for RW negotiation at time. Pt continues to have RLE deviations with over-shooting step length and pre-mature extension of RLE knee causing an ataxic gait. Poor RW proximity noted and requires short standing rest breaks due to TURNER. Extended seated rest breaks required between all ambulation trials. Will continue to benefit from ongoing skilled PT to maximize his functional mobility and increase his level of independence. See flowsheet documentation for full assessment.

## 2023-10-06 NOTE — ASSESSMENT & PLAN NOTE
59-year-old male with prior left MCA stroke status post thrombectomy and left carotid stent, prior right MCA stroke status post thrombectomy, right carotid stenosis, PVD, DM, dyslipidemia, HTN, prior tobacco use, depression, "zoning" spells of unclear etiology (on Keppra), presents with fevers, chills, cough, SOB, CP, and urinary incontinence. He was found to be septic due to Covid-19. MRI brain was performed this admission as patient was scheduled for outpatient MRI brain (ordered due to wife's concern for balance and vision issues), which demonstrated acute/subacute stroke in existing chronic left MCA stroke bed. Patient has known severe proximal left M2 stenosis. It is likely that relative hypotension (BP as low as 108/79 this admission) caused hypoperfusion of the area supplied by the severely stenotic left MCA, resulting in additional stroke within the chronic stroke bed (vs. shinethrough artifact). Patient had loop recorder for 3 years, which did not demonstrate arrhythmia. His outpatient stroke team felt cardioembolic source was unlikely. Patient is already on triple antiplatelet therapy, including ASA, Brillinta, and Pletal, along with high intensity statin. Plan:  -recommend continuing current antiplatelet and statin regimen.  -would avoid hypotension to ensure adequate brain perfusion.  -continue Keppra 500mg BID. -treatment of infectious/metabolic derangements as per primary service. -patient has outpt appt with Epilepsy AP on 12/12/23. Appt with stroke clinic has also been requested.

## 2023-10-06 NOTE — CASE MANAGEMENT
Case Management Discharge Planning Note    Patient name Darius Rodas  Location /-70 MRN [id number]  : 1960 Date 10/6/2023       Current Admission Date: 10/3/2023  Current Admission Diagnosis:Sepsis due to [address])   Patient Active Problem List    Diagnosis Date Noted    Sepsis due to [address]) 10/03/2023    Focal epilepsy ([address]) 2023    Claudication of both lower extremities ([address]) 2023    Type 2 diabetes mellitus with other diabetic ophthalmic complication ([address])     Aphasia 2023    Atherosclerosis of native arteries of extremities with intermittent claudication, bilateral legs ([address]) 2023    Benign prostatic hyperplasia with lower urinary tract symptoms 2023    Possible NPH (normal pressure hydrocephalus) ([address]) 2023    Muscle spasms of both lower extremities 2023    Multiple thyroid nodules 2023    Abnormal laboratory test 05/15/2023    History of tobacco use 2023    Chronic ischemic left MCA stroke 2023    Hypokalemia 2023    Infrarenal abdominal aortic aneurysm (AAA) without rupture ([address]) 2023    Tachycardia 2023    Prediabetes 2023    SIRS (systemic inflammatory response syndrome) ([address]) 2023    Chronic anticoagulation 2023    Stroke ([address]) 2023    Hyponatremia 2023    Middle cerebral artery stenosis, right 2023    Left posterior MCA stroke - etiology unclear at this time 2023    Chronic low back pain 2023    Hypertensive encephalopathy, transient 2023    Snoring 08/10/2020    Memory deficits 08/10/2020    Chronic ischemic right MCA stroke 2019    Status post placement of implantable loop recorder 2019    Type 2 diabetes mellitus ([address]) 2019    Anxiety and depression 2019    Insomnia 2019    Fall 2019    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 ([address]) 2019    Urinary retention 2019    At risk for venous thromboembolism (VTE) 03/26/2019    Hypertriglyceridemia 03/26/2019    Nicotine dependence 03/26/2019    Carotid stenosis, bilateral 03/26/2019    Presbyopia 03/26/2019    History of stroke 03/19/2019    Headache 03/19/2019    Primary hypertension 03/19/2019    GERD (gastroesophageal reflux disease) 03/19/2019      LOS (days): 3  Geometric Mean LOS (GMLOS) (days): 5.10  Days to GMLOS:2.1     OBJECTIVE:  Risk of Unplanned Readmission Score: 28.17         Current admission status: Inpatient   Preferred Pharmacy:   [facility] [address] VALDEZ AYALA [address]  [address] [facility] [address]  Phone: [phone] Fax: [address] [facility], [address] [place] [address],[address]  [address] [place] [address] Drive [address]  Phone: [phone] Fax: [phone]    Primary Care Provider: ERIC Fernandez    Primary Insurance: BLUE CROSS  Secondary Insurance: [facility] [employer] RE[employer]    DISCHARGE DETAILS:    IMM Given (Date):: 10/06/23  IMM Given to[de-identified] Family  Family notified[de-identified] Spoke with Karyle Crochet, the pt's wife to reivew the IMM and the pt's safe d/c plan. Pt's wife voiced understanding of IMM.

## 2023-10-06 NOTE — PLAN OF CARE
Problem: Potential for Falls  Goal: Patient will remain free of falls  Description: INTERVENTIONS:  - Educate patient/family on patient safety including physical limitations  - Instruct patient to call for assistance with activity   - Consult OT/PT to assist with strengthening/mobility   - Keep Call bell within reach  - Keep bed low and locked with side rails adjusted as appropriate  - Keep care items and personal belongings within reach  - Initiate and maintain comfort rounds  - Make Fall Risk Sign visible to staff  - Offer Toileting every 2 Hours, in advance of need  - Initiate/Maintain bed alarm  - Obtain necessary fall risk management equipment: socks  - Apply yellow socks and bracelet for high fall risk patients  - Consider moving patient to room near nurses station  Outcome: Progressing     Problem: MOBILITY - ADULT  Goal: Maintain or return to baseline ADL function  Description: INTERVENTIONS:  -  Assess patient's ability to carry out ADLs; assess patient's baseline for ADL function and identify physical deficits which impact ability to perform ADLs (bathing, care of mouth/teeth, toileting, grooming, dressing, etc.)  - Assess/evaluate cause of self-care deficits   - Assess range of motion  - Assess patient's mobility; develop plan if impaired  - Assess patient's need for assistive devices and provide as appropriate  - Encourage maximum independence but intervene and supervise when necessary  - Involve family in performance of ADLs  - Assess for home care needs following discharge   - Consider OT consult to assist with ADL evaluation and planning for discharge  - Provide patient education as appropriate  Outcome: Progressing  Goal: Maintains/Returns to pre admission functional level  Description: INTERVENTIONS:  - Perform BMAT or MOVE assessment daily.   - Set and communicate daily mobility goal to care team and patient/family/caregiver.    - Collaborate with rehabilitation services on mobility goals if consulted  - Perform Range of Motion 3 times a day. - Reposition patient every 2 hours.   - Dangle patient 3 times a day  - Stand patient 3 times a day  - Ambulate patient 3 times a day  - Out of bed to chair 3 times a day   - Out of bed for meals 3 times a day  - Out of bed for toileting  - Record patient progress and toleration of activity level   Outcome: Progressing     Problem: INFECTION - ADULT  Goal: Absence or prevention of progression during hospitalization  Description: INTERVENTIONS:  - Assess and monitor for signs and symptoms of infection  - Monitor lab/diagnostic results  - Monitor all insertion sites, i.e. indwelling lines, tubes, and drains  - Monitor endotracheal if appropriate and nasal secretions for changes in amount and color  - [place] appropriate cooling/warming therapies per order  - Administer medications as ordered  - Instruct and encourage patient and family to use good hand hygiene technique  - Identify and instruct in appropriate isolation precautions for identified infection/condition  Outcome: Progressing  Goal: Absence of fever/infection during neutropenic period  Description: INTERVENTIONS:  - Monitor WBC    Outcome: Progressing     Problem: Prexisting or High Potential for Compromised Skin Integrity  Goal: Skin integrity is maintained or improved  Description: INTERVENTIONS:  - Identify patients at risk for skin breakdown  - Assess and monitor skin integrity  - Assess and monitor nutrition and hydration status  - Monitor labs   - Assess for incontinence   - Turn and reposition patient  - Assist with mobility/ambulation  - Relieve pressure over bony prominences  - Avoid friction and shearing  - Provide appropriate hygiene as needed including keeping skin clean and dry  - Evaluate need for skin moisturizer/barrier cream  - Collaborate with interdisciplinary team   - Patient/family teaching  - Consider wound care consult   Outcome: Progressing     Problem: Nutrition/Hydration-ADULT  Goal: Nutrient/Hydration intake appropriate for improving, restoring or maintaining nutritional needs  Description: Monitor and assess patient's nutrition/hydration status for malnutrition. Collaborate with interdisciplinary team and initiate plan and interventions as ordered. Monitor patient's weight and dietary intake as ordered or per policy. Utilize nutrition screening tool and intervene as necessary. Determine patient's food preferences and provide high-protein, high-caloric foods as appropriate.      INTERVENTIONS:  - Monitor oral intake, urinary output, labs, and treatment plans  - Assess nutrition and hydration status and recommend course of action  - Evaluate amount of meals eaten  - Assist patient with eating if necessary   - Allow adequate time for meals  - Recommend/ encourage appropriate diets, oral nutritional supplements, and vitamin/mineral supplements  - Order, calculate, and assess calorie counts as needed  - Recommend, monitor, and adjust tube feedings and TPN/PPN based on assessed needs  - Assess need for intravenous fluids  - Provide specific nutrition/hydration education as appropriate  - Include patient/family/caregiver in decisions related to nutrition  Outcome: Progressing

## 2023-10-06 NOTE — ASSESSMENT & PLAN NOTE
Presented to ED for cough, chest pain, SOB. Seen 10/2 in ED for 1 bloody BM which resolved. COVID 19 positive 10/3  CTA PE study obtained due to d dimer and tachycardia: No definite acute pulmonary emboli but sensitivity is decreased due to mild motion artifact. Evaluation of the lungs mildly compromised by motion with no acute disease.   Stable on RA, mild pathway   ED initiated IV remdesivir, continue given high risk patient (obesity, DM2, heart disease)  SC lovenox BID per protocol for Dvt prophylaxis  Supportive care   Monitor O2, resp protocol   Suspect tachycardia is reactive  Briefly required IV D5 for poor appetite/generalized weakness, appears improved today with better appetite  PT/OT eval: recommending rehab

## 2023-10-06 NOTE — PLAN OF CARE
Problem: Potential for Falls  Goal: Patient will remain free of falls  Description: INTERVENTIONS:  - Educate patient/family on patient safety including physical limitations  - Instruct patient to call for assistance with activity   - Consult OT/PT to assist with strengthening/mobility   - Keep Call bell within reach  - Keep bed low and locked with side rails adjusted as appropriate  - Keep care items and personal belongings within reach  - Initiate and maintain comfort rounds  - Make Fall Risk Sign visible to staff  - Offer Toileting every 2 Hours, in advance of need  - Initiate/Maintain bed/chair alarm  - Obtain necessary fall risk management equipment: yellow bracelet/socks  - Apply yellow socks and bracelet for high fall risk patients  - Consider moving patient to room near nurses station  Outcome: Progressing     Problem: MOBILITY - ADULT  Goal: Maintain or return to baseline ADL function  Description: INTERVENTIONS:  -  Assess patient's ability to carry out ADLs; assess patient's baseline for ADL function and identify physical deficits which impact ability to perform ADLs (bathing, care of mouth/teeth, toileting, grooming, dressing, etc.)  - Assess/evaluate cause of self-care deficits   - Assess range of motion  - Assess patient's mobility; develop plan if impaired  - Assess patient's need for assistive devices and provide as appropriate  - Encourage maximum independence but intervene and supervise when necessary  - Involve family in performance of ADLs  - Assess for home care needs following discharge   - Consider OT consult to assist with ADL evaluation and planning for discharge  - Provide patient education as appropriate  Outcome: Progressing  Goal: Maintains/Returns to pre admission functional level  Description: INTERVENTIONS:  - Perform BMAT or MOVE assessment daily.   - Set and communicate daily mobility goal to care team and patient/family/caregiver.    - Collaborate with rehabilitation services on mobility goals if consulted  - Perform Range of Motion 3 times a day. - Reposition patient every 2 hours.   - Dangle patient 3 times a day  - Stand patient 3 times a day  - Ambulate patient 3 times a day  - Out of bed to chair 3 times a day   - Out of bed for meals 3 times a day  - Out of bed for toileting  - Record patient progress and toleration of activity level   Outcome: Progressing     Problem: INFECTION - ADULT  Goal: Absence or prevention of progression during hospitalization  Description: INTERVENTIONS:  - Assess and monitor for signs and symptoms of infection  - Monitor lab/diagnostic results  - Monitor all insertion sites, i.e. indwelling lines, tubes, and drains  - Monitor endotracheal if appropriate and nasal secretions for changes in amount and color  - [place] appropriate cooling/warming therapies per order  - Administer medications as ordered  - Instruct and encourage patient and family to use good hand hygiene technique  - Identify and instruct in appropriate isolation precautions for identified infection/condition  Outcome: Progressing  Goal: Absence of fever/infection during neutropenic period  Description: INTERVENTIONS:  - Monitor WBC    Outcome: Progressing     Problem: Prexisting or High Potential for Compromised Skin Integrity  Goal: Skin integrity is maintained or improved  Description: INTERVENTIONS:  - Identify patients at risk for skin breakdown  - Assess and monitor skin integrity  - Assess and monitor nutrition and hydration status  - Monitor labs   - Assess for incontinence   - Turn and reposition patient  - Assist with mobility/ambulation  - Relieve pressure over bony prominences  - Avoid friction and shearing  - Provide appropriate hygiene as needed including keeping skin clean and dry  - Evaluate need for skin moisturizer/barrier cream  - Collaborate with interdisciplinary team   - Patient/family teaching  - Consider wound care consult   Outcome: Progressing

## 2023-10-06 NOTE — CONSULTS
Consultation - Neurology   Luis Felipe Chavez 58 y.o. male MRN: [id number]  Unit/Bed#: -01 Encounter: [id number]      Assessment/Plan     Stroke [facility])  Assessment & Plan  78-year-old male with prior left MCA stroke status post thrombectomy and left carotid stent, prior right MCA stroke status post thrombectomy, right carotid stenosis, PVD, DM, dyslipidemia, HTN, prior tobacco use, depression, "zoning" spells of unclear etiology (on Keppra), presents with fevers, chills, cough, SOB, CP, and urinary incontinence. He was found to be septic due to Covid-19. MRI brain was performed this admission as patient was scheduled for outpatient MRI brain (ordered due to wife's concern for balance and vision issues), which demonstrated acute/subacute stroke in existing chronic left MCA stroke bed. Patient has known severe proximal left M2 stenosis. It is likely that relative hypotension (BP as low as 108/79 this admission) caused hypoperfusion of the area supplied by the severely stenotic left MCA, resulting in additional stroke within the chronic stroke bed (vs. shinethrough artifact). Patient had loop recorder for 3 years, which did not demonstrate arrhythmia. His outpatient stroke team felt cardioembolic source was unlikely. Patient is already on triple antiplatelet therapy, including ASA, Brillinta, and Pletal, along with high intensity statin. Plan:  -recommend continuing current antiplatelet and statin regimen.  -would avoid hypotension to ensure adequate brain perfusion.  -continue Keppra 500mg BID. -treatment of infectious/metabolic derangements as per primary service. -patient has outpt appt with Epilepsy AP on 12/12/23. Appt with stroke clinic has also been requested. Luis Felipe Chavez will need follow up in in 6 weeks with neurovascular attending. He will not require outpatient neurological testing.     History of Present Illness     Reason for Consult / Principal Problem: New strokes noted on MRI brain  Hx and PE limited by: isolation precautions due to Covid-19  HPI: Jamie Mary is a 58 y.o. right handed male with prior left MCA stroke status post thrombectomy and left carotid stent, prior right MCA stroke status post thrombectomy, right carotid stenosis, PVD, DM, dyslipidemia, HTN, prior tobacco use, depression, "zoning" spells of unclear etiology (on Keppra), presents with fevers, chills, cough, SOB, CP, and urinary incontinence. He was found to be septic due to Covid-19. MRI brain was performed this admission which demonstrated acute/subacute stroke in existing chronic left MCA stroke bed. Neurology was asked to comment. Patient follows with Dr. Marilee Valdez (Neurology) as outpt. He is maintained on ASA, Brillinta, and statin for secondary stroke prevention, and per Vascular Surgery, is also on Pletal for his PVD. He has known severe proximal left M2 stenosis, severe distal right M1 stenosis, as well as 73% right carotid stenosis (had left carotid stent in May 2023). He had loop recorder for 3 years which did not demonstrate AF (cardioembolic source of strokes felt unlikely). He has been having spells of unclear etiology (which his outpatient team is aware of) during which he cannot see or hear and is "zoning", but he recalls having them. The episodes can last up to 4-5 hours. He is currently on Keppra 500mg BID. EEG in July 2023 demonstrated findings consistent with presence of structural lesion over the left frontotemporal region superimposed on mild-moderate diffuse cerebral dysfunction. He has also had difficulty with his vision, with suspicion for R visual field deficit, which could be explained by his left temporoparietooccipital stroke. He was scheduled for outpatient repeat MRI brain as his wife reported difficulties with his vision and balance to the outpatient team, which he missed due to his current hospitalization.  The primary team decided to order the study while inpatient, and the MRI demonstrated a few punctate foci of acute/subacute infarcts within the chronic left MCA territory infarct. Of note, his BP has been recorded as low as 108/79 this admission.     Inpatient consult to Neurology  Consult performed by: Scooter Sheets PA-C  Consult ordered by: Hugo Goodwin PA-C          Review of Systems   Unable to perform ROS: Acuity of condition       Historical Information   Past Medical History:   Diagnosis Date    Depression     Diabetes mellitus ([address])     patient denies    Dyslipidemia 03/26/2019    GERD (gastroesophageal reflux disease)     Hyperlipidemia     Hypertension     Prediabetes     Prediabetes 04/03/2019    Stroke [facility])      Past Surgical History:   Procedure Laterality Date    ARTHROSCOPIC REPAIR ACL Left     BACK SURGERY      twice    CARPAL TUNNEL RELEASE Right     IR CEREBRAL ANGIOGRAPHY  05/04/2023    IR STROKE ALERT  03/19/2019    SHOULDER SURGERY Left      Social History   Social History     Substance and Sexual Activity   Alcohol Use Never     Social History     Substance and Sexual Activity   Drug Use Never     E-Cigarette/Vaping    E-Cigarette Use Never User      E-Cigarette/Vaping Substances    Nicotine No     THC No     CBD No     Flavoring No     Other No     Unknown No      Social History     Tobacco Use   Smoking Status Former   Smokeless Tobacco Never     Family History:   Family History   Problem Relation Age of Onset    Hyperlipidemia Mother     Hypertension Mother     Diabetes unspecified Mother         prediabetes    Heart attack Mother     Diabetes Mother     Hyperlipidemia Father     Hypertension Father     Prostate cancer Father     Stroke Father     Hyperlipidemia Sister     Hypertension Sister     Hyperlipidemia Sister     Hypertension Sister     Depression Sister     Hypertension Sister     Hyperlipidemia Sister     Depression Sister     Hyperlipidemia Brother     Hypertension Brother     Hypertension Brother     Prostate cancer Brother Hypothyroidism Daughter     Hypothyroidism Son     Diabetes unspecified Son     Seizures Neg Hx        Review of previous medical records was completed. Meds/Allergies   PTA meds:   Prior to Admission Medications   Prescriptions Last Dose Informant Patient Reported? Taking?   acetaminophen (TYLENOL) 325 mg tablet  Spouse/Significant Other No No   Sig: Take 3 tablets (975 mg total) by mouth 3 (three) times a day as needed for mild pain, headaches or fever   aspirin (ECOTRIN LOW STRENGTH) 81 mg EC tablet  Spouse/Significant Other No No   Sig: Take 1 tablet (81 mg total) by mouth daily Do not start before April 19, 2023. atorvastatin (LIPITOR) 40 mg tablet  Spouse/Significant Other Yes No   Sig: Take 40 mg by mouth daily with breakfast   baclofen 5 MG TABS  Spouse/Significant Other No No   Sig: Take 5 mg by mouth 2 (two) times a day as needed for muscle spasms   cilostazol (PLETAL) 100 mg tablet  Spouse/Significant Other No No   Sig: Take 1 tablet (100 mg total) by mouth 2 (two) times a day before meals Do not start before June 6, 2023. donepezil (ARICEPT) 10 mg tablet  Spouse/Significant Other No No   Sig: Take 1 tablet (10 mg total) by mouth in the morning   levETIRAcetam (Keppra) 500 mg tablet   No No   Sig: Take 1 tab (500 mg) twice a day for 1 week, then take 2 tabs (1000 mg) twice a day.    lidocaine (Lidoderm) 5 %  Spouse/Significant Other No No   Sig: Apply 1 patch topically over 12 hours daily Remove & Discard patch within 12 hours or as directed by MD   losartan (COZAAR) 50 mg tablet  Spouse/Significant Other Yes No   Sig: Take 50 mg by mouth daily   metoprolol succinate (TOPROL-XL) 100 mg 24 hr tablet  Spouse/Significant Other Yes No   Sig: Take 100 mg by mouth daily   pantoprazole (PROTONIX) 40 mg tablet  Spouse/Significant Other No No   Sig: Take 1 tablet (40 mg total) by mouth daily   tamsulosin (FLOMAX) 0.4 mg  Spouse/Significant Other No No   Sig: Take 1 capsule (0.4 mg total) by mouth daily with dinner   ticagrelor (BRILINTA) 90 MG  Spouse/Significant Other No No   Sig: Take 1 tablet (90 mg total) by mouth every 12 (twelve) hours   traZODone (DESYREL) 100 mg tablet  Spouse/Significant Other Yes No   Sig: Take 100 mg by mouth daily at bedtime      Facility-Administered Medications: None       Allergies   Allergen Reactions    Flexeril [Cyclobenzaprine] Drowsiness    Lisinopril Cough    Nsaids Confusion       Objective   Vitals:Blood pressure 157/72, pulse 80, temperature 97.9 °F (36.6 °C), resp. rate 18, SpO2 96 %. ,There is no height or weight on file to calculate BMI. Intake/Output Summary (Last 24 hours) at 10/6/2023 1531  Last data filed at 10/6/2023 1448  Gross per 24 hour   Intake 240 ml   Output 325 ml   Net -85 ml       Invasive Devices: Invasive Devices       Peripheral Intravenous Line  Duration             Peripheral IV 10/03/23 Left Antecubital 3 days                    Physical Exam  General:  Patient is well-developed, well-nourished, and in no acute distress. HEENT:  Head normocephalic. Eyes anicteric. Cardiovascular:  With regular rhythm. Lungs:  Normal effort. Nonlabored breathing. Extremities:  With no significant edema. Skin: No rashes. Neurologic Exam limited 2/2 Covid precautions (seen together with attending)  Neurologic:  Patient is alert, pleasantly interactive, and appropriately conversational.  No obvious symbolic language difficulty or dysarthria. EOMS appear intact. No obvious facial asymmetry.     Lab Results: CBC:   Results from last 7 days   Lab Units 10/06/23  0451 10/05/23  0536 10/04/23  0237   WBC Thousand/uL 6.19 6.76 6.22   RBC Million/uL 3.84* 3.67* 3.68*   HEMOGLOBIN g/dL 11.9* 11.5* 11.5*   HEMATOCRIT % 36.1* 34.4* 34.8*   MCV fL 94 94 95   PLATELETS Thousands/uL 213 209 202   , BMP/CMP:   Results from last 7 days   Lab Units 10/06/23  0451 10/05/23  0536 10/04/23  0237 10/03/23  0739 10/02/23  1348   SODIUM mmol/L 139 138 135 137 138   POTASSIUM mmol/L 3.5 3.3* 3.3* 3.5 4.1   CHLORIDE mmol/L 110* 109* 105 104 105   CO2 mmol/L 21 23 22 27 27   BUN mg/dL 14 14 15 18 22   CREATININE mg/dL 0.89 0.92 0.94 1.15 1.23   CALCIUM mg/dL 8.7 8.4 8.8 9.7 9.9   AST U/L  --   --  21 16 14   ALT U/L  --   --  14 14 13   ALK PHOS U/L  --   --  74 87 86   EGFR ml/min/1.73sq m 90 87 85 66 61   , HgBA1C:   , Lipid Profile:   Results from last 7 days   Lab Units 10/03/23  0739   TRIGLYCERIDES mg/dL 89     Imaging Studies: I have personally reviewed pertinent reports. and I have personally reviewed pertinent films in PACS MRI brain  EKG, Pathology, and Other Studies: I have personally reviewed pertinent reports.     VTE Prophylaxis: Sequential compression device Bevely Keen)     Code Status: Level 1 - Full Code  Advance Directive and Living Will:      Power of :    POLST:

## 2023-10-06 NOTE — PROGRESS NOTES
This RN noted the MRI brain findings and asked PA if any new orders based on acute to subacute infarct findings will be ordered. Provider stated not at this time. Neurology consulted & patient started on continuous cardiac monitoring. Plan of care ongoing.

## 2023-10-06 NOTE — PROGRESS NOTES
[facility]  Progress Note  Name: Louie Ramírez  MRN: [id number]  Unit/Bed#: -01 I Date of Admission: 10/3/2023   Date of Service: 10/6/2023 I Hospital Day: 3    Assessment/Plan   Atherosclerosis of native arteries of extremities with intermittent claudication, bilateral legs ([address])  Assessment & Plan  Continue pletal    Chronic ischemic left MCA stroke  Assessment & Plan  status post left carotid stent, right carotid stenosis, had a loop recorder placed for 3 years which did not show any evidence of A-fib   MRI brain here shows evidence of additional stroke in chronic stroke bed   Neuro consulted, advised continuing triple anti platelet therapy  Follows with neurology outpatient   Currently on keppra 500 mg BID due to "zoning out spells" which are possible but not confirmed seizures. Follow up with Epilepsy AP on 12/12 is scheduled   Will also follow up in stroke clinic    Prediabetes  Assessment & Plan  No longer on metformin  ISS and accucheks  DM diet      Stroke [facility])  Assessment & Plan  Acute to subacute infarct of parietal temporal region, POA, in patient with known chronic stroke evidenced by MRI   MRI brain; Few punctate foci of restricted diffusion within the chronic left MCA territory infarct in the parietal temporal region, possibly indicating acute to subacute infarcts. No mass effect or hemorrhagic conversion. Continue Keppra and seizure precautions  Neuro consulted and no further inpatient workup recommended, continue triple anti platelet therapy plus statin  Follow up with stroke clinic as outpatient        Primary hypertension  Assessment & Plan  Continue home losartan and metoprolol    * Sepsis due to COVID-19 [facility])  Assessment & Plan  Presented to ED for cough, chest pain, SOB. Seen 10/2 in ED for 1 bloody BM which resolved.    COVID 19 positive 10/3  CTA PE study obtained due to d dimer and tachycardia: No definite acute pulmonary emboli but sensitivity is decreased due to mild motion artifact. Evaluation of the lungs mildly compromised by motion with no acute disease. Stable on RA, mild pathway   ED initiated IV remdesivir, continue given high risk patient (obesity, DM2, heart disease)  SC lovenox BID per protocol for Dvt prophylaxis  Supportive care   Monitor O2, resp protocol   Suspect tachycardia is reactive  Briefly required IV D5 for poor appetite/generalized weakness, appears improved today with better appetite  PT/OT eval: recommending rehab        VTE Pharmacologic Prophylaxis: VTE Score: 4 Moderate Risk (Score 3-4) - Pharmacological DVT Prophylaxis Ordered: enoxaparin (Lovenox). Patient Centered Rounds: I performed bedside rounds with nursing staff today. Discussions with Specialists or Other Care Team Provider: nursing and case management    Education and Discussions with Family / Patient: Attempted to update  (wife) via phone. Left voicemail. Total Time Spent on Date of Encounter in care of patient: 45 mins. This time was spent on one or more of the following: performing physical exam; counseling and coordination of care; obtaining or reviewing history; documenting in the medical record; reviewing/ordering tests, medications or procedures; communicating with other healthcare professionals and discussing with patient's family/caregivers. Current Length of Stay: 3 day(s)  Current Patient Status: Inpatient   Certification Statement: The patient will continue to require additional inpatient hospital stay due to pending rehab placement  Discharge Plan: Anticipate discharge tomorrow to rehab facility. Code Status: Level 1 - Full Code    Subjective:   Patient states he feels well today. His breathing feels back to baseline. He has no new neurologic symptoms. Denies all review of systems. He is eager to be discharged.     Objective:     Vitals:   Temp (24hrs), Av °F (36.7 °C), Min:97.9 °F (36.6 °C), Max:98 °F (36.7 °C)    Temp: [97.9 °F (36.6 °C)-98 °F (36.7 °C)] 97.9 °F (36.6 °C)  HR:  [80-94] 80  Resp:  [13-18] 18  BP: (157-161)/(72-85) 157/72  SpO2:  [96 %] 96 %  There is no height or weight on file to calculate BMI. Input and Output Summary (last 24 hours): Intake/Output Summary (Last 24 hours) at 10/6/2023 1615  Last data filed at 10/6/2023 1448  Gross per 24 hour   Intake 240 ml   Output 325 ml   Net -85 ml       Physical Exam:   Physical Exam  Vitals and nursing note reviewed. Constitutional:       General: He is not in acute distress. Appearance: Normal appearance. He is normal weight. He is not ill-appearing or toxic-appearing. HENT:      Head: Normocephalic and atraumatic. Right Ear: External ear normal.      Left Ear: External ear normal.      Nose: Nose normal.      Mouth/Throat:      Mouth: Mucous membranes are moist.      Pharynx: Oropharynx is clear. Eyes:      Extraocular Movements: Extraocular movements intact. Conjunctiva/sclera: Conjunctivae normal.      Pupils: Pupils are equal, round, and reactive to light. Cardiovascular:      Rate and Rhythm: Normal rate and regular rhythm. Pulses: Normal pulses. Heart sounds: Normal heart sounds. No murmur heard. No friction rub. No gallop. Pulmonary:      Effort: Pulmonary effort is normal. No respiratory distress. Breath sounds: Normal breath sounds. No stridor. No wheezing, rhonchi or rales. Abdominal:      General: Abdomen is flat. Bowel sounds are normal. There is no distension. Palpations: Abdomen is soft. There is no mass. Tenderness: There is no abdominal tenderness. There is no guarding or rebound. Hernia: No hernia is present. Musculoskeletal:      Cervical back: Normal range of motion. Right lower leg: No edema. Left lower leg: No edema. Skin:     General: Skin is warm and dry. Capillary Refill: Capillary refill takes less than 2 seconds.    Neurological:      General: No focal deficit present. Mental Status: He is alert and oriented to person, place, and time. Mental status is at baseline. Cranial Nerves: No cranial nerve deficit. Sensory: No sensory deficit. Motor: No weakness. Coordination: Coordination normal.   Psychiatric:         Mood and Affect: Mood normal.          Additional Data:     Labs:  Results from last 7 days   Lab Units 10/06/23  0451   WBC Thousand/uL 6.19   HEMOGLOBIN g/dL 11.9*   HEMATOCRIT % 36.1*   PLATELETS Thousands/uL 213   NEUTROS PCT % 50   LYMPHS PCT % 30   MONOS PCT % 12   EOS PCT % 6     Results from last 7 days   Lab Units 10/06/23  0451 10/05/23  0536 10/04/23  0237   SODIUM mmol/L 139   < > 135   POTASSIUM mmol/L 3.5   < > 3.3*   CHLORIDE mmol/L 110*   < > 105   CO2 mmol/L 21   < > 22   BUN mg/dL 14   < > 15   CREATININE mg/dL 0.89   < > 0.94   ANION GAP mmol/L 8   < > 8   CALCIUM mg/dL 8.7   < > 8.8   ALBUMIN g/dL  --   --  3.8   TOTAL BILIRUBIN mg/dL  --   --  0.49   ALK PHOS U/L  --   --  74   ALT U/L  --   --  14   AST U/L  --   --  21   GLUCOSE RANDOM mg/dL 92   < > 84    < > = values in this interval not displayed.      Results from last 7 days   Lab Units 10/03/23  0739   INR  1.08     Results from last 7 days   Lab Units 10/06/23  1217 10/06/23  0822 10/05/23  2056 10/05/23  1623 10/05/23  1134 10/05/23  0743 10/04/23  1952 10/04/23  1610 10/04/23  1219 10/04/23  0751 10/03/23  2049 10/03/23  1651   POC GLUCOSE mg/dl 117 97 91 126 112 103 99 75 79 86 90 91         Results from last 7 days   Lab Units 10/05/23  0536 10/03/23  0739   LACTIC ACID mmol/L  --  1.4   PROCALCITONIN ng/ml 0.05 0.07       Lines/Drains:  Invasive Devices       Peripheral Intravenous Line  Duration             Peripheral IV 10/03/23 Left Antecubital 3 days                      Telemetry:  Telemetry Orders (From admission, onward)               24 Hour Telemetry Monitoring  Continuous x 24 Hours (Telem)        Question:  Reason for 24 Hour Telemetry  Answer: TIA/Suspected CVA/ Confirmed CVA                     Telemetry Reviewed: Normal Sinus Rhythm  Indication for Continued Telemetry Use: Acute CVA             Imaging: Reviewed radiology reports from this admission including: MRI brain    Recent Cultures (last 7 days):   Results from last 7 days   Lab Units 10/03/23  0739   BLOOD CULTURE  No Growth at 72 hrs. No Growth at 72 hrs.        Last 24 Hours Medication List:   Current Facility-Administered Medications   Medication Dose Route Frequency Provider Last Rate    acetaminophen  975 mg Oral Q8H [facility], TIM      aspirin  81 mg Oral Daily Elta Reno Ry, PA-MARIELA      atorvastatin  40 mg Oral Daily With Breakfast Elta Gilson FountaineTIM      baclofen  5 mg Oral BID PRN Elta Reno Fountaine, PA-MARIELA      cilostazol  100 mg Oral BID AC LUANN Martin-MARIELA      donepezil  10 mg Oral Daily Elta Gilson Fountaine, PA-C      enoxaparin  30 mg Subcutaneous Q12H [address] Sep[place] Keisha Aldrich PA-C      guaiFENesin  600 mg Oral Q12H [address] [place] LUANN Norton-C      insulin lispro  1-5 Units Subcutaneous HS Sepidehjosesito Aldrich, PA-C      insulin lispro  1-6 Units Subcutaneous TID AC Sepideh Aldrich PA-C      levETIRAcetam  500 mg Oral Q12H [address] Sepideh Aldrich PA-C      losartan  50 mg Oral Daily Lugene LaTIM brown      melatonin  6 mg Oral HS Sepideh Aldrich PA-C      metoprolol succinate  100 mg Oral Daily Elta Reno Fountaine, PA-C      pantoprazole  40 mg Oral Daily Lugene La, PA-MARIELA      remdesivir  100 mg Intravenous Q24H Elta Reno FountTIM diaz      tamsulosin  0.4 mg Oral Daily With [employer], TIM      ticagrelor  90 mg Oral Q12H [address] Sabigene TIM La          Today, Patient Was Seen By: Ida Rasheed PA-C    **Please Note: This note may have been constructed using a voice recognition system. **

## 2023-10-07 VITALS
HEIGHT: 70 IN | DIASTOLIC BLOOD PRESSURE: 88 MMHG | TEMPERATURE: 98.3 F | OXYGEN SATURATION: 96 % | BODY MASS INDEX: 29.35 KG/M2 | RESPIRATION RATE: 18 BRPM | WEIGHT: 205.03 LBS | SYSTOLIC BLOOD PRESSURE: 170 MMHG | HEART RATE: 101 BPM

## 2023-10-07 LAB
BASOPHILS # BLD AUTO: 0.03 THOUSANDS/ÂΜL (ref 0–0.1)
BASOPHILS NFR BLD AUTO: 1 % (ref 0–1)
EOSINOPHIL # BLD AUTO: 0.27 THOUSAND/ÂΜL (ref 0–0.61)
EOSINOPHIL NFR BLD AUTO: 4 % (ref 0–6)
ERYTHROCYTE [DISTWIDTH] IN BLOOD BY AUTOMATED COUNT: 12.6 % (ref 11.6–15.1)
GLUCOSE SERPL-MCNC: 91 MG/DL (ref 65–140)
HCT VFR BLD AUTO: 34.8 % (ref 36.5–49.3)
HGB BLD-MCNC: 11.5 G/DL (ref 12–17)
IMM GRANULOCYTES # BLD AUTO: 0.02 THOUSAND/UL (ref 0–0.2)
IMM GRANULOCYTES NFR BLD AUTO: 0 % (ref 0–2)
LYMPHOCYTES # BLD AUTO: 2.11 THOUSANDS/ÂΜL (ref 0.6–4.47)
LYMPHOCYTES NFR BLD AUTO: 33 % (ref 14–44)
MCH RBC QN AUTO: 30.7 PG (ref 26.8–34.3)
MCHC RBC AUTO-ENTMCNC: 33 G/DL (ref 31.4–37.4)
MCV RBC AUTO: 93 FL (ref 82–98)
MONOCYTES # BLD AUTO: 0.72 THOUSAND/ÂΜL (ref 0.17–1.22)
MONOCYTES NFR BLD AUTO: 11 % (ref 4–12)
NEUTROPHILS # BLD AUTO: 3.23 THOUSANDS/ÂΜL (ref 1.85–7.62)
NEUTS SEG NFR BLD AUTO: 51 % (ref 43–75)
NRBC BLD AUTO-RTO: 0 /100 WBCS
PLATELET # BLD AUTO: 237 THOUSANDS/UL (ref 149–390)
PMV BLD AUTO: 10.3 FL (ref 8.9–12.7)
RBC # BLD AUTO: 3.74 MILLION/UL (ref 3.88–5.62)
WBC # BLD AUTO: 6.38 THOUSAND/UL (ref 4.31–10.16)

## 2023-10-07 PROCEDURE — 85025 COMPLETE CBC W/AUTO DIFF WBC: CPT | Performed by: STUDENT IN AN ORGANIZED HEALTH CARE EDUCATION/TRAINING PROGRAM

## 2023-10-07 PROCEDURE — 99239 HOSP IP/OBS DSCHRG MGMT >30: CPT | Performed by: PHYSICIAN ASSISTANT

## 2023-10-07 PROCEDURE — 82948 REAGENT STRIP/BLOOD GLUCOSE: CPT

## 2023-10-07 RX ADMIN — GUAIFENESIN 600 MG: 600 TABLET ORAL at 08:06

## 2023-10-07 RX ADMIN — ACETAMINOPHEN 975 MG: 325 TABLET, FILM COATED ORAL at 05:18

## 2023-10-07 RX ADMIN — ENOXAPARIN SODIUM 30 MG: 100 INJECTION SUBCUTANEOUS at 08:06

## 2023-10-07 RX ADMIN — PANTOPRAZOLE SODIUM 40 MG: 40 TABLET, DELAYED RELEASE ORAL at 08:06

## 2023-10-07 RX ADMIN — LEVETIRACETAM 500 MG: 500 TABLET, FILM COATED ORAL at 08:05

## 2023-10-07 RX ADMIN — METOPROLOL SUCCINATE 100 MG: 50 TABLET, EXTENDED RELEASE ORAL at 08:06

## 2023-10-07 RX ADMIN — CILOSTAZOL 100 MG: 50 TABLET ORAL at 05:18

## 2023-10-07 RX ADMIN — DONEPEZIL HYDROCHLORIDE 10 MG: 5 TABLET, FILM COATED ORAL at 08:05

## 2023-10-07 RX ADMIN — TICAGRELOR 90 MG: 90 TABLET ORAL at 08:06

## 2023-10-07 RX ADMIN — LOSARTAN POTASSIUM 50 MG: 50 TABLET, FILM COATED ORAL at 08:06

## 2023-10-07 RX ADMIN — ASPIRIN 81 MG: 81 TABLET, COATED ORAL at 08:06

## 2023-10-07 RX ADMIN — ATORVASTATIN CALCIUM 40 MG: 40 TABLET, FILM COATED ORAL at 08:05

## 2023-10-07 NOTE — CASE MANAGEMENT
Case Management Discharge Planning Note    Patient name Mark Johns  Location /-90 MRN [id number]  : 1960 Date 10/7/2023       Current Admission Date: 10/3/2023  [facility]   Patient Active Problem List    Diagnosis Date Noted   • [facility] 10/03/2023   • Focal epilepsy ([address]) 2023   • Claudication of both lower extremities ([address]) 2023   • Type 2 diabetes mellitus with other diabetic ophthalmic complication ([address])    • Aphasia 2023   • Atherosclerosis of native arteries of extremities with intermittent claudication, bilateral legs ([address]) 2023   • Benign prostatic hyperplasia with lower urinary tract symptoms 2023   • Possible NPH (normal pressure hydrocephalus) ([address]) 2023   • Muscle spasms of both lower extremities 2023   • Multiple thyroid nodules 2023   • Abnormal laboratory test 05/15/2023   • History of tobacco use 2023   • Chronic ischemic left MCA stroke 2023   • Hypokalemia 2023   • Infrarenal abdominal aortic aneurysm (AAA) without rupture ([address]) 2023   • Tachycardia 2023   • Prediabetes 2023   • SIRS (systemic inflammatory response syndrome) ([address]) 2023   • Chronic anticoagulation 2023   • Stroke ([address]) 2023   • Hyponatremia 2023   • Middle cerebral artery stenosis, right 2023   • Left posterior MCA stroke - etiology unclear at this time 2023   • Chronic low back pain 2023   • Hypertensive encephalopathy, transient 2023   • Snoring 08/10/2020   • Memory deficits 08/10/2020   • Chronic ischemic right MCA stroke 2019   • Status post placement of implantable loop recorder 2019   • Type 2 diabetes mellitus ([address]) 2019   • Anxiety and depression 2019   • Insomnia 2019   • Fall 2019   • 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 ([address]) 2019   • Urinary retention 03/27/2019   • At risk for venous thromboembolism (VTE) 03/26/2019   • Hypertriglyceridemia 03/26/2019   • Nicotine dependence 03/26/2019   • Carotid stenosis, bilateral 03/26/2019   • Presbyopia 03/26/2019   • History of stroke 03/19/2019   • Headache 03/19/2019   • Primary hypertension 03/19/2019   • GERD (gastroesophageal reflux disease) 03/19/2019      LOS (days): 4  Geometric Mean LOS (GMLOS) (days): 5.10  Days to GMLOS:1.3     OBJECTIVE:  Risk of Unplanned Readmission Score: 28.47         Current admission status: Inpatient   Preferred Pharmacy:   [address] [place] [address]  [facility]  Phone: [phone] Fax: [facility], [address] [place] [address],[address]  [address] [facility],[address]  [id number] [place] [address]  Phone: [phone] Fax: [phone]    Primary Care Provider: Sally Nyhan, CRNP    Primary Insurance: [facility] [address]: [address] [place] [address] Number: [id number]

## 2023-10-07 NOTE — DISCHARGE SUMMARY
[address]  Discharge- Luis Felipe Chavez 1960, 58 y.o. male MRN: [id number]  Unit/Bed#: -01 Encounter: [id number]  Primary Care Provider: ERIC Rodrigues   Date and time admitted to hospital: 10/3/2023  7:29 AM    * Sepsis due to COVID-19 [facility])  Assessment & Plan  Presented to ED for cough, chest pain, SOB. Seen 10/2 in ED for 1 bloody BM which resolved. COVID 19 positive 10/3  CTA PE study obtained due to d dimer and tachycardia: No definite acute pulmonary emboli but sensitivity is decreased due to mild motion artifact. Evaluation of the lungs mildly compromised by motion with no acute disease. Stable on RA, mild pathway   ED initiated IV remdesivir, continue given high risk patient (obesity, DM2, heart disease)  Supportive care   Suspect tachycardia is reactive  PT/OT eval: recommending rehab. Patient stable for discharge today to [facility]    Atherosclerosis of native arteries of extremities with intermittent claudication, bilateral legs ([address])  Assessment & Plan  Continue pletal    Chronic ischemic left MCA stroke  Assessment & Plan  status post left carotid stent, right carotid stenosis, had a loop recorder placed for 3 years which did not show any evidence of A-fib   MRI brain here shows evidence of additional stroke in chronic stroke bed   Neuro consulted, advised continuing triple anti platelet therapy  Follows with neurology outpatient   Currently on keppra 500 mg BID due to "zoning out spells" which are possible but not confirmed seizures. Follow up with Epilepsy AP on 12/12 is scheduled   Will also follow up in stroke clinic    Prediabetes  Assessment & Plan  No longer on metformin  DM diet      Stroke [facility])  Assessment & Plan  Acute to subacute infarct of parietal temporal region, POA, in patient with known chronic stroke evidenced by MRI   MRI brain;  Few punctate foci of restricted diffusion within the chronic left MCA territory infarct in the parietal temporal region, possibly indicating acute to subacute infarcts. No mass effect or hemorrhagic conversion. Continue Keppra and seizure precautions  Neuro consulted and no further inpatient workup recommended, continue triple anti platelet therapy plus statin  Follow up with stroke clinic as outpatient        Primary hypertension  Assessment & Plan  Continue home losartan and metoprolol  Blood pressure stable      Medical Problems       Resolved Problems  Date Reviewed: 10/7/2023   None       Discharging Physician / Practitioner: Leatha Shaikh PA-C  PCP: Braydon Christianson, [address] [place] [address]  Admission Date:   Admission Orders (From admission, onward)       Ordered        10/03/23 1403  INPATIENT ADMISSION  Once                          Discharge Date: 10/07/23    Consultations During Hospital Stay:  Neurology  PT/OT  Case management    Procedures Performed:   None    Significant Findings / Test Results:   CXR: No acute cardiopulmonary disease  CTA PE study: No definite acute pulmonary emboli but sensitivity is decreased due to mild motion artifact. Evaluation of the lungs mildly compromised by motion with no acute disease  MRI brain: Few punctate foci of restricted diffusion within the chronic left MCA territory infarct in the parietal temporal region, possibly indicating acute to subacute infarcts. No mass effect or hemorrhagic conversion. COVID-positive  Blood cultures negative x2 after 72 hours    Incidental Findings:   None     Test Results Pending at Discharge (will require follow up): None     Outpatient Tests Requested:  None    Complications:  None    Reason for Admission: Sepsis due to [facility] Course:   Byron Angel is a 58 y.o. male patient who originally presented to the hospital on 10/3/2023 due to shortness of breath. Past medical history significant for CVA, type 2 diabetes, hypertension, hyperlipidemia.   Patient presented to the emergency department due to fever/chills, weakness, cough and shortness of breath. Patient was diagnosed with COVID-19. SPO2 remained stable on RA, given multiple comorbidities patient was started on mild pathway for COVID-19 treatment. Patient was scheduled for outpatient MRI brain however missed appointment due to hospitalization and therefore this was completed as inpatient. Given findings of possible ischemia, neurology was consulted. No changes to medication regimen was recommended. Patient will follow-up with neurology as outpatient. /OT recommended rehab to which patient and family were agreeable. On day of discharge patient was afebrile, hemodynamically stable and SPO2 stable on RA. Please see above list of diagnoses and related plan for additional information. Condition at Discharge: stable    Discharge Day Visit / Exam:   Subjective:  States he feels well, eager for discharge. Vitals: Blood Pressure: 170/88 (10/07/23 0804)  Pulse: 101 (10/07/23 0804)  Temperature: 98.3 °F (36.8 °C) (10/07/23 0804)  Temp Source: Oral (10/04/23 1951)  Respirations: 18 (10/07/23 0804)  Height: 5' 10" (177.8 cm) (10/06/23 2202)  Weight - Scale: 93 kg (205 lb 0.4 oz) (10/06/23 2202)  SpO2: 96 % (10/07/23 0804)  Exam:   Physical Exam  Vitals and nursing note reviewed. Constitutional:       General: He is not in acute distress. Appearance: He is well-developed. Comments: No acute distress   HENT:      Head: Normocephalic and atraumatic. Eyes:      General: No scleral icterus. Extraocular Movements: Extraocular movements intact. Conjunctiva/sclera: Conjunctivae normal.   Cardiovascular:      Rate and Rhythm: Normal rate and regular rhythm. Heart sounds: No murmur heard. Pulmonary:      Effort: Pulmonary effort is normal. No respiratory distress. Breath sounds: Normal breath sounds. No wheezing, rhonchi or rales. Abdominal:      General: Bowel sounds are normal.      Palpations: Abdomen is soft. Tenderness:  There is no abdominal tenderness. There is no guarding or rebound. Musculoskeletal:         General: No swelling. Cervical back: Normal range of motion. Comments: No edema   Skin:     General: Skin is warm and dry. Capillary Refill: Capillary refill takes less than 2 seconds. Neurological:      Mental Status: He is alert. Mental status is at baseline. Psychiatric:         Mood and Affect: Mood normal.         Speech: Speech normal.         Behavior: Behavior normal.          Discussion with Family: Attempted to update  (wife) via phone. Left voicemail. Discharge instructions/Information to patient and family:   See after visit summary for information provided to patient and family. Provisions for Follow-Up Care:  See after visit summary for information related to follow-up care and any pertinent home health orders. Disposition:   Other [facility]    Planned Readmission: None     Discharge Statement:  I spent 60 minutes discharging the patient. This time was spent on the day of discharge. I had direct contact with the patient on the day of discharge. Greater than 50% of the total time was spent examining patient, answering all patient questions, arranging and discussing plan of care with patient as well as directly providing post-discharge instructions. Additional time then spent on discharge activities. Discharge Medications:  See after visit summary for reconciled discharge medications provided to patient and/or family.       **Please Note: This note may have been constructed using a voice recognition system**

## 2023-10-07 NOTE — CASE MANAGEMENT
[facility] has received approved authorization from insurance: [address] in by Rep: Aman Kathleenneo P#: [phone] opt.  Melanie received for: SNF  Facility: [facility] [place]   Authorization #: [id number]  Start of Care: 10/06/2023  Next Review Date: 10/12/2023  Submit next review to: Fax. [phone]  Provider Services P#: [phone]  Care Manager notified: Reyes Vieyra

## 2023-10-07 NOTE — ASSESSMENT & PLAN NOTE
Presented to ED for cough, chest pain, SOB. Seen 10/2 in ED for 1 bloody BM which resolved. COVID 19 positive 10/3  CTA PE study obtained due to d dimer and tachycardia: No definite acute pulmonary emboli but sensitivity is decreased due to mild motion artifact. Evaluation of the lungs mildly compromised by motion with no acute disease. Stable on RA, mild pathway   ED initiated IV remdesivir, continue given high risk patient (obesity, DM2, heart disease)  Supportive care   Suspect tachycardia is reactive  PT/OT eval: recommending rehab.   Patient stable for discharge today to [facility]

## 2023-10-07 NOTE — PLAN OF CARE
Problem: Potential for Falls  Goal: Patient will remain free of falls  Description: INTERVENTIONS:  - Educate patient/family on patient safety including physical limitations  - Instruct patient to call for assistance with activity   - Consult OT/PT to assist with strengthening/mobility   - Keep Call bell within reach  - Keep bed low and locked with side rails adjusted as appropriate  - Keep care items and personal belongings within reach  - Initiate and maintain comfort rounds  - Make Fall Risk Sign visible to staff  - Offer Toileting every 2 Hours, in advance of need  - Initiate/Maintain bed/chair alarm  - Obtain necessary fall risk management equipment:   - Apply yellow socks and bracelet for high fall risk patients  - Consider moving patient to room near nurses station  Outcome: Progressing     Problem: MOBILITY - ADULT  Goal: Maintain or return to baseline ADL function  Description: INTERVENTIONS:  -  Assess patient's ability to carry out ADLs; assess patient's baseline for ADL function and identify physical deficits which impact ability to perform ADLs (bathing, care of mouth/teeth, toileting, grooming, dressing, etc.)  - Assess/evaluate cause of self-care deficits   - Assess range of motion  - Assess patient's mobility; develop plan if impaired  - Assess patient's need for assistive devices and provide as appropriate  - Encourage maximum independence but intervene and supervise when necessary  - Involve family in performance of ADLs  - Assess for home care needs following discharge   - Consider OT consult to assist with ADL evaluation and planning for discharge  - Provide patient education as appropriate  Outcome: Progressing  Goal: Maintains/Returns to pre admission functional level  Description: INTERVENTIONS:  - Perform BMAT or MOVE assessment daily.   - Set and communicate daily mobility goal to care team and patient/family/caregiver.    - Collaborate with rehabilitation services on mobility goals if consulted  - Perform Range of Motion 3 times a day. - Reposition patient every 2 hours.   - Dangle patient 3 times a day  - Stand patient 3 times a day  - Ambulate patient 3 times a day  - Out of bed to chair 3 times a day   - Out of bed for meals 3 times a day  - Out of bed for toileting  - Record patient progress and toleration of activity level   Outcome: Progressing     Problem: INFECTION - ADULT  Goal: Absence or prevention of progression during hospitalization  Description: INTERVENTIONS:  - Assess and monitor for signs and symptoms of infection  - Monitor lab/diagnostic results  - Monitor all insertion sites, i.e. indwelling lines, tubes, and drains  - Monitor endotracheal if appropriate and nasal secretions for changes in amount and color  - [place] appropriate cooling/warming therapies per order  - Administer medications as ordered  - Instruct and encourage patient and family to use good hand hygiene technique  - Identify and instruct in appropriate isolation precautions for identified infection/condition  Outcome: Progressing  Goal: Absence of fever/infection during neutropenic period  Description: INTERVENTIONS:  - Monitor WBC    Outcome: Progressing     Problem: Prexisting or High Potential for Compromised Skin Integrity  Goal: Skin integrity is maintained or improved  Description: INTERVENTIONS:  - Identify patients at risk for skin breakdown  - Assess and monitor skin integrity  - Assess and monitor nutrition and hydration status  - Monitor labs   - Assess for incontinence   - Turn and reposition patient  - Assist with mobility/ambulation  - Relieve pressure over bony prominences  - Avoid friction and shearing  - Provide appropriate hygiene as needed including keeping skin clean and dry  - Evaluate need for skin moisturizer/barrier cream  - Collaborate with interdisciplinary team   - Patient/family teaching  - Consider wound care consult   Outcome: Progressing     Problem: Nutrition/Hydration-ADULT  Goal: Nutrient/Hydration intake appropriate for improving, restoring or maintaining nutritional needs  Description: Monitor and assess patient's nutrition/hydration status for malnutrition. Collaborate with interdisciplinary team and initiate plan and interventions as ordered. Monitor patient's weight and dietary intake as ordered or per policy. Utilize nutrition screening tool and intervene as necessary. Determine patient's food preferences and provide high-protein, high-caloric foods as appropriate.      INTERVENTIONS:  - Monitor oral intake, urinary output, labs, and treatment plans  - Assess nutrition and hydration status and recommend course of action  - Evaluate amount of meals eaten  - Assist patient with eating if necessary   - Allow adequate time for meals  - Recommend/ encourage appropriate diets, oral nutritional supplements, and vitamin/mineral supplements  - Order, calculate, and assess calorie counts as needed  - Recommend, monitor, and adjust tube feedings and TPN/PPN based on assessed needs  - Assess need for intravenous fluids  - Provide specific nutrition/hydration education as appropriate  - Include patient/family/caregiver in decisions related to nutrition  Outcome: Progressing

## 2023-10-08 LAB
BACTERIA BLD CULT: NORMAL
BACTERIA BLD CULT: NORMAL

## 2023-10-09 NOTE — CASE MANAGEMENT
Case Management Progress Note    Patient name Philippe Shin  Location [address] [facility] [place] 317/-13 MRN [id number]  : 1960 Date 10/9/2023       LOS (days): 4  Geometric Mean LOS (GMLOS) (days): 5.10  Days to GMLOS:1.1        OBJECTIVE:        Current admission status: Inpatient  Preferred Pharmacy:   [facility] [address] VALDEZ AYALA [address] [place] [address]  [address] [facility] [address]  Phone: [phone] Fax: [phone] [address], [address] [facility] [address] [facility] [address]  Phone: [phone] Fax: [phone]    Primary Care Provider: ERIC Howell    Primary Insurance: BLUE CROSS  Secondary Insurance: [facility] REP    PROGRESS NOTE:    Notification made to OP CM Handoff: [facility] OP CM regarding discharge planning and disposition. Spoke to [address] in PCP office to inform of dc to [employer] this weekend.

## 2023-10-10 ENCOUNTER — HOSPITAL ENCOUNTER (OUTPATIENT)
Dept: ULTRASOUND IMAGING | Facility: HOSPITAL | Age: 63
Discharge: HOME | End: 2023-10-10
Attending: INTERNAL MEDICINE

## 2023-10-10 NOTE — UTILIZATION REVIEW
NOTIFICATION OF ADMISSION DISCHARGE   This is a Notification of Discharge from [address]. Please be advised that this patient has been discharge from our facility. Below you will find the admission and discharge date and time including the patient’s disposition. UTILIZATION REVIEW CONTACT:  Mallory Swanson  Utilization   Network Utilization Review Department  Phone: [phone] carefully listen to the prompts. All voicemails are confidential.  Email: [email or web address]     ADMISSION INFORMATION  PRESENTATION DATE: 10/3/2023  7:29 AM  OBERVATION ADMISSION DATE:   INPATIENT ADMISSION DATE: 10/3/23  2:04 PM   DISCHARGE DATE: 10/7/2023  1:40 PM   DISPOSITION:[facility] SNF/TCU/SNU    Network Utilization Review Department  ATTENTION: Please call with any questions or concerns to [phone] and carefully listen to the prompts so that you are directed to the right person. All voicemails are confidential.   For Discharge needs, contact Care Management DC Support Team at [phone] opt. 2  Send all requests for admission clinical reviews, approved or denied determinations and any other requests to dedicated fax number below belonging to the campus where the patient is receiving treatment.  List of dedicated fax numbers for the Facilities:  [place] DENIALS (Administrative/Medical Necessity) [phone]   DISCHARGE SUPPORT TEAM (Network) [phone] [facility] [phone] [address] [facility] [address] [place] [address] [address]  [place] [phone]

## 2023-10-18 NOTE — PLAN OF CARE
Problem: PAIN - ADULT  Goal: Verbalizes/displays adequate comfort level or baseline comfort level  Description: Interventions:  - Encourage patient to monitor pain and request assistance  - Assess pain using appropriate pain scale  - Administer analgesics based on type and severity of pain and evaluate response  - Implement non-pharmacological measures as appropriate and evaluate response  - Consider cultural and social influences on pain and pain management  - Notify physician/advanced practitioner if interventions unsuccessful or patient reports new pain  Outcome: Progressing     Problem: INFECTION - ADULT  Goal: Absence or prevention of progression during hospitalization  Description: INTERVENTIONS:  - Assess and monitor for signs and symptoms of infection  - Monitor lab/diagnostic results  - Monitor all insertion sites, i e  indwelling lines, tubes, and drains  - Monitor endotracheal if appropriate and nasal secretions for changes in amount and color  - [place] appropriate cooling/warming therapies per order  - Administer medications as ordered  - Instruct and encourage patient and family to use good hand hygiene technique  - Identify and instruct in appropriate isolation precautions for identified infection/condition  Outcome: Progressing  Goal: Absence of fever/infection during neutropenic period  Description: INTERVENTIONS:  - Monitor WBC    Outcome: Progressing     Problem: SAFETY ADULT  Goal: Patient will remain free of falls  Description: INTERVENTIONS:  - Educate patient/family on patient safety including physical limitations  - Instruct patient to call for assistance with activity   - Consult OT/PT to assist with strengthening/mobility   - Keep Call bell within reach  - Keep bed low and locked with side rails adjusted as appropriate  - Keep care items and personal belongings within reach  - Initiate and maintain comfort rounds  - Make Fall Risk Sign visible to staff  - Offer Toileting every 2 Hours, in advance of need  - Initiate/Maintain bed alarm  - Obtain necessary fall risk management equipment: bed alarm  - Apply yellow socks and bracelet for high fall risk patients  - Consider moving patient to room near nurses station  Outcome: Progressing  Goal: Maintain or return to baseline ADL function  Description: INTERVENTIONS:  -  Assess patient's ability to carry out ADLs; assess patient's baseline for ADL function and identify physical deficits which impact ability to perform ADLs (bathing, care of mouth/teeth, toileting, grooming, dressing, etc )  - Assess/evaluate cause of self-care deficits   - Assess range of motion  - Assess patient's mobility; develop plan if impaired  - Assess patient's need for assistive devices and provide as appropriate  - Encourage maximum independence but intervene and supervise when necessary  - Involve family in performance of ADLs  - Assess for home care needs following discharge   - Consider OT consult to assist with ADL evaluation and planning for discharge  - Provide patient education as appropriate  Outcome: Progressing  Goal: Maintains/Returns to pre admission functional level  Description: INTERVENTIONS:  - Perform BMAT or MOVE assessment daily    - Set and communicate daily mobility goal to care team and patient/family/caregiver  - Collaborate with rehabilitation services on mobility goals if consulted  - Perform Range of Motion 3 times a day  - Reposition patient every 2 hours    - Dangle patient 3 times a day  - Stand patient 3 times a day  - Ambulate patient 3 times a day  - Out of bed to chair 3 times a day   - Out of bed for meals 3 times a day  - Out of bed for toileting  - Record patient progress and toleration of activity level   Outcome: Progressing     Problem: DISCHARGE PLANNING  Goal: Discharge to home or other facility with appropriate resources  Description: INTERVENTIONS:  - Identify barriers to discharge w/patient and caregiver  - Arrange for needed discharge resources and transportation as appropriate  - Identify discharge learning needs (meds, wound care, etc )  - Arrange for interpretive services to assist at discharge as needed  - Refer to Case Management Department for coordinating discharge planning if the patient needs post-hospital services based on physician/advanced practitioner order or complex needs related to functional status, cognitive ability, or social support system  Outcome: Progressing     Problem: Prexisting or High Potential for Compromised Skin Integrity  Goal: Skin integrity is maintained or improved  Description: INTERVENTIONS:  - Identify patients at risk for skin breakdown  - Assess and monitor skin integrity  - Assess and monitor nutrition and hydration status  - Monitor labs   - Assess for incontinence   - Turn and reposition patient  - Assist with mobility/ambulation  - Relieve pressure over bony prominences  - Avoid friction and shearing  - Provide appropriate hygiene as needed including keeping skin clean and dry  - Evaluate need for skin moisturizer/barrier cream  - Collaborate with interdisciplinary team   - Patient/family teaching  - Consider wound care consult   Outcome: Progressing     Problem: MOBILITY - ADULT  Goal: Maintain or return to baseline ADL function  Description: INTERVENTIONS:  -  Assess patient's ability to carry out ADLs; assess patient's baseline for ADL function and identify physical deficits which impact ability to perform ADLs (bathing, care of mouth/teeth, toileting, grooming, dressing, etc )  - Assess/evaluate cause of self-care deficits   - Assess range of motion  - Assess patient's mobility; develop plan if impaired  - Assess patient's need for assistive devices and provide as appropriate  - Encourage maximum independence but intervene and supervise when necessary  - Involve family in performance of ADLs  - Assess for home care needs following discharge   - Consider OT consult to assist with ADL evaluation and planning for discharge  - Provide patient education as appropriate  Outcome: Progressing  Goal: Maintains/Returns to pre admission functional level  Description: INTERVENTIONS:  - Perform BMAT or MOVE assessment daily    - Set and communicate daily mobility goal to care team and patient/family/caregiver  - Collaborate with rehabilitation services on mobility goals if consulted  - Perform Range of Motion 3 times a day  - Reposition patient every 2 hours  - Dangle patient 3 times a day  - Stand patient 3 times a day  - Ambulate patient 3 times a day  - Out of bed to chair 3 times a day   - Out of bed for meals 3 times a day  - Out of bed for toileting  - Record patient progress and toleration of activity level   Outcome: Progressing     Problem: Nutrition/Hydration-ADULT  Goal: Nutrient/Hydration intake appropriate for improving, restoring or maintaining nutritional needs  Description: Monitor and assess patient's nutrition/hydration status for malnutrition  Collaborate with interdisciplinary team and initiate plan and interventions as ordered  Monitor patient's weight and dietary intake as ordered or per policy  Utilize nutrition screening tool and intervene as necessary  Determine patient's food preferences and provide high-protein, high-caloric foods as appropriate       INTERVENTIONS:  - Monitor oral intake, urinary output, labs, and treatment plans  - Assess nutrition and hydration status and recommend course of action  - Evaluate amount of meals eaten  - Assist patient with eating if necessary   - Allow adequate time for meals  - Recommend/ encourage appropriate diets, oral nutritional supplements, and vitamin/mineral supplements  - Order, calculate, and assess calorie counts as needed  - Recommend, monitor, and adjust tube feedings and TPN/PPN based on assessed needs  - Assess need for intravenous fluids  - Provide specific nutrition/hydration education as appropriate  - Include patient/family/caregiver in decisions related to nutrition  Outcome: Progressing 34

## 2023-11-15 ENCOUNTER — OFFICE VISIT (OUTPATIENT)
Dept: UROLOGY | Facility: HOSPITAL | Age: 63
End: 2023-11-15
Attending: PSYCHIATRY & NEUROLOGY
Payer: COMMERCIAL

## 2023-11-15 VITALS
DIASTOLIC BLOOD PRESSURE: 82 MMHG | SYSTOLIC BLOOD PRESSURE: 132 MMHG | BODY MASS INDEX: 29.42 KG/M2 | HEIGHT: 70 IN | OXYGEN SATURATION: 100 % | HEART RATE: 48 BPM

## 2023-11-15 DIAGNOSIS — R33.9 URINARY RETENTION: ICD-10-CM

## 2023-11-15 DIAGNOSIS — N40.1 BENIGN PROSTATIC HYPERPLASIA WITH URINARY FREQUENCY: Primary | ICD-10-CM

## 2023-11-15 DIAGNOSIS — Z12.5 SCREENING FOR PROSTATE CANCER: ICD-10-CM

## 2023-11-15 DIAGNOSIS — R35.0 BENIGN PROSTATIC HYPERPLASIA WITH URINARY FREQUENCY: Primary | ICD-10-CM

## 2023-11-15 LAB — POST-VOID RESIDUAL VOLUME, ML POC: 0 ML

## 2023-11-15 PROCEDURE — 51798 US URINE CAPACITY MEASURE: CPT | Performed by: PHYSICIAN ASSISTANT

## 2023-11-15 PROCEDURE — 99203 OFFICE O/P NEW LOW 30 MIN: CPT | Performed by: PHYSICIAN ASSISTANT

## 2023-11-15 RX ORDER — HYDROCHLOROTHIAZIDE 12.5 MG/1
TABLET ORAL
COMMUNITY
Start: 2023-10-24

## 2023-11-15 RX ORDER — TAMSULOSIN HYDROCHLORIDE 0.4 MG/1
0.4 CAPSULE ORAL
Qty: 90 CAPSULE | Refills: 3 | Status: SHIPPED | OUTPATIENT
Start: 2023-11-15

## 2023-11-15 NOTE — PROGRESS NOTES
1. Benign prostatic hyperplasia with urinary frequency  POCT Measure PVR      2. Screening for prostate cancer  PSA, Total Screen      3. Urinary retention  tamsulosin (FLOMAX) 0.4 mg              Assessment and plan:       1. BPH with lower urinary tract symptoms  -Continue tamsulosin monotherapy  -Emptying bladder well today  -Urine in October 2023 negative for hematuria or infection    2. Prostate cancer screening  3. Family history of prostate cancer  -Patient will be ordered a PSA. If this is normal, recommend annual routine screening with primary care provider. Patient to follow with urology on as-needed basis. -     Afshan Salmeron PA-C      Chief Complaint     Urinary retention     History of Present Illness     Luis Felipe Mays is a 58 y.o. male presenting today for consultation. On tamsulosin monotherapy. Urinalysis with microscopy 10/3/23 negative for hematuria nor infection. Had a catheter placed in may 2023 while in rehab. Passed a voiding trial thereafter. Patient endorses stable urination at this time. Denies any dysuria, gross hematuria, incontinence, or infections. Is comfortable with his volitional voiding. He does report a family history of prostate cancer in his father and brother. No recent PSAs. Medical comorbidities include GERD, type 2 diabetes, stroke, HTN, AAA, hemiplegia, tachycardia, nicotine dependence. PVR 0mL  AUA SYMPTOM SCORE      Flowsheet Row Most Recent Value   AUA SYMPTOM SCORE    How often have you had a sensation of not emptying your bladder completely after you finished urinating? 0   How often have you had to urinate again less than two hours after you finished urinating? 0   How often have you found you stopped and started again several times when you urinate? 1   How often have you found it difficult to postpone urination? 0   How often have you had a weak urinary stream? 0   How often have you had to push or strain to begin urination?  2 How many times did you most typically get up to urinate from the time you went to bed at night until the time you got up in the morning? 2   Quality of Life: If you were to spend the rest of your life with your urinary condition just the way it is now, how would you feel about that? 2   AUA SYMPTOM SCORE 5          Laboratory     Lab Results   Component Value Date    CREATININE 0.89 10/06/2023       Review of Systems     Review of Systems   Constitutional:  Negative for activity change, appetite change, chills, diaphoresis, fatigue, fever and unexpected weight change. Respiratory:  Negative for chest tightness and shortness of breath. Cardiovascular:  Negative for chest pain, palpitations and leg swelling. Gastrointestinal:  Negative for abdominal distention, abdominal pain, constipation, diarrhea, nausea and vomiting. Genitourinary:  Negative for decreased urine volume, difficulty urinating, dysuria, enuresis, flank pain, frequency, genital sores, hematuria and urgency. Musculoskeletal:  Negative for back pain, gait problem and myalgias. Skin:  Negative for color change, pallor, rash and wound. Psychiatric/Behavioral:  Negative for behavioral problems. The patient is not nervous/anxious. Allergies     Allergies   Allergen Reactions    Flexeril [Cyclobenzaprine] Drowsiness    Lisinopril Cough    Nsaids Confusion       Physical Exam     Physical Exam  Constitutional:       General: He is not in acute distress. Appearance: Normal appearance. He is not ill-appearing, toxic-appearing or diaphoretic. HENT:      Head: Normocephalic and atraumatic. Eyes:      General:         Right eye: No discharge. Left eye: No discharge. Conjunctiva/sclera: Conjunctivae normal.   Pulmonary:      Effort: Pulmonary effort is normal. No respiratory distress. Musculoskeletal:      Comments: Ambulates with wheelchair assistance   Neurological:      General: No focal deficit present. Mental Status: He is alert and oriented to person, place, and time.    Psychiatric:         Mood and Affect: Mood normal.         Behavior: Behavior normal.           Vital Signs     Vitals:    11/15/23 1255   BP: 132/82   BP Location: Right arm   Patient Position: Sitting   Cuff Size: Adult   Pulse: (!) 48   SpO2: 100%   Height: 5' 10" (1.778 m)         Current Medications       Current Outpatient Medications:     acetaminophen (TYLENOL) 325 mg tablet, Take 3 tablets (975 mg total) by mouth 3 (three) times a day as needed for mild pain, headaches or fever, Disp: , Rfl: 0    aspirin (ECOTRIN LOW STRENGTH) 81 mg EC tablet, Take 1 tablet (81 mg total) by mouth daily Do not start before April 19, 2023., Disp: 30 tablet, Rfl: 0    atorvastatin (LIPITOR) 40 mg tablet, Take 40 mg by mouth daily with breakfast, Disp: , Rfl:     baclofen 5 MG TABS, Take 5 mg by mouth 2 (two) times a day as needed for muscle spasms, Disp: , Rfl: 0    cilostazol (PLETAL) 100 mg tablet, Take 1 tablet (100 mg total) by mouth 2 (two) times a day before meals Do not start before June 6, 2023., Disp: , Rfl: 0    donepezil (ARICEPT) 10 mg tablet, Take 1 tablet (10 mg total) by mouth in the morning, Disp: 60 tablet, Rfl: 2    hydrochlorothiazide (HYDRODIURIL) 12.5 mg tablet, , Disp: , Rfl:     levETIRAcetam (Keppra) 500 mg tablet, Take 1 tab (500 mg) twice a day for 1 week, then take 2 tabs (1000 mg) twice a day., Disp: 360 tablet, Rfl: 3    lidocaine (Lidoderm) 5 %, Apply 1 patch topically over 12 hours daily Remove & Discard patch within 12 hours or as directed by MD, Disp: 30 patch, Rfl: 2    losartan (COZAAR) 50 mg tablet, Take 50 mg by mouth daily, Disp: , Rfl:     metoprolol succinate (TOPROL-XL) 100 mg 24 hr tablet, Take 100 mg by mouth daily, Disp: , Rfl:     pantoprazole (PROTONIX) 40 mg tablet, Take 1 tablet (40 mg total) by mouth daily, Disp: 90 tablet, Rfl: 2    tamsulosin (FLOMAX) 0.4 mg, Take 1 capsule (0.4 mg total) by mouth daily with dinner, Disp: 90 capsule, Rfl: 3    ticagrelor (BRILINTA) 90 MG, Take 1 tablet (90 mg total) by mouth every 12 (twelve) hours, Disp: , Rfl: 0    traZODone (DESYREL) 100 mg tablet, Take 100 mg by mouth daily at bedtime, Disp: , Rfl:       Active Problems     Patient Active Problem List   Diagnosis    History of stroke    Headache    Primary hypertension    GERD (gastroesophageal reflux disease)    At risk for venous thromboembolism (VTE)    Hypertriglyceridemia    Nicotine dependence    Carotid stenosis, bilateral    Presbyopia    Urinary retention    Fall    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 ([address])    Anxiety and depression    Insomnia    Type 2 diabetes mellitus ([address])    Status post placement of implantable loop recorder    Chronic ischemic right MCA stroke    Snoring    Memory deficits    Hypertensive encephalopathy, transient    Chronic low back pain    Middle cerebral artery stenosis, right    Left posterior MCA stroke - etiology unclear at this time    Chronic anticoagulation    Stroke (HCC)    Hyponatremia    Prediabetes    SIRS (systemic inflammatory response syndrome) (HCC)    Tachycardia    Infrarenal abdominal aortic aneurysm (AAA) without rupture ([address])    History of tobacco use    Chronic ischemic left MCA stroke    Hypokalemia    Abnormal laboratory test    Multiple thyroid nodules    Possible NPH (normal pressure hydrocephalus) (HCC)    Muscle spasms of both lower extremities    Aphasia    Atherosclerosis of native arteries of extremities with intermittent claudication, bilateral legs (HCC)    Benign prostatic hyperplasia with lower urinary tract symptoms    Type 2 diabetes mellitus with other diabetic ophthalmic complication (HCC)    Claudication of both lower extremities ([address])    Focal epilepsy ([address])    Sepsis due to COVID-19 [facility])         Past Medical History     Past Medical History:   Diagnosis Date    Depression     Diabetes mellitus ([address])     patient denies    Dyslipidemia 03/26/2019    GERD (gastroesophageal reflux disease)     Hyperlipidemia     Hypertension     Prediabetes     Prediabetes 04/03/2019    Stroke [facility])          Surgical History     Past Surgical History:   Procedure Laterality Date    ARTHROSCOPIC REPAIR ACL Left     BACK SURGERY      twice    CARPAL TUNNEL RELEASE Right     IR CEREBRAL ANGIOGRAPHY  05/04/2023    IR STROKE ALERT  03/19/2019    SHOULDER SURGERY Left          Family History     Family History   Problem Relation Age of Onset    Hyperlipidemia Mother     Hypertension Mother     Diabetes unspecified Mother         prediabetes    Heart attack Mother     Diabetes Mother     Hyperlipidemia Father     Hypertension Father     Prostate cancer Father     Stroke Father     Hyperlipidemia Sister     Hypertension Sister     Hyperlipidemia Sister     Hypertension Sister     Depression Sister     Hypertension Sister     Hyperlipidemia Sister     Depression Sister     Hyperlipidemia Brother     Hypertension Brother     Hypertension Brother     Prostate cancer Brother     Hypothyroidism Daughter     Hypothyroidism Son     Diabetes unspecified Son     Seizures Neg Hx          Social History     Social History       Radiology

## 2023-11-20 ENCOUNTER — TELEPHONE (OUTPATIENT)
Dept: NEUROLOGY | Facility: CLINIC | Age: 63
End: 2023-11-20

## 2023-11-20 LAB — HBA1C MFR BLD HPLC: 5.5 %

## 2023-11-20 NOTE — TELEPHONE ENCOUNTER
ADD ON:    Scheduled patient with Dr Melody Ponce in [place] ([place]) 11/21/23 @ 1030 am    MRI completed on 10/05/23 [id number]

## 2023-11-21 ENCOUNTER — HOSPITAL ENCOUNTER (OUTPATIENT)
Dept: ULTRASOUND IMAGING | Facility: HOSPITAL | Age: 63
Discharge: HOME | End: 2023-11-21
Attending: INTERNAL MEDICINE
Payer: COMMERCIAL

## 2023-11-21 ENCOUNTER — OFFICE VISIT (OUTPATIENT)
Dept: NEUROLOGY | Facility: CLINIC | Age: 63
End: 2023-11-21
Payer: COMMERCIAL

## 2023-11-21 VITALS
WEIGHT: 205 LBS | HEART RATE: 60 BPM | HEIGHT: 70 IN | SYSTOLIC BLOOD PRESSURE: 120 MMHG | BODY MASS INDEX: 29.35 KG/M2 | DIASTOLIC BLOOD PRESSURE: 70 MMHG

## 2023-11-21 DIAGNOSIS — Z86.73 HISTORY OF STROKE: Primary | ICD-10-CM

## 2023-11-21 DIAGNOSIS — I65.23 CAROTID STENOSIS, BILATERAL: ICD-10-CM

## 2023-11-21 DIAGNOSIS — E04.2 MULTIPLE THYROID NODULES: ICD-10-CM

## 2023-11-21 PROCEDURE — 88172 CYTP DX EVAL FNA 1ST EA SITE: CPT | Performed by: PATHOLOGY

## 2023-11-21 PROCEDURE — 99215 OFFICE O/P EST HI 40 MIN: CPT | Performed by: PSYCHIATRY & NEUROLOGY

## 2023-11-21 PROCEDURE — 88173 CYTOPATH EVAL FNA REPORT: CPT | Performed by: PATHOLOGY

## 2023-11-21 PROCEDURE — 10005 FNA BX W/US GDN 1ST LES: CPT

## 2023-11-21 RX ORDER — LIDOCAINE HYDROCHLORIDE 10 MG/ML
5 INJECTION, SOLUTION EPIDURAL; INFILTRATION; INTRACAUDAL; PERINEURAL ONCE
Status: COMPLETED | OUTPATIENT
Start: 2023-11-21 | End: 2023-11-21

## 2023-11-21 RX ADMIN — LIDOCAINE HYDROCHLORIDE 5 ML: 10 INJECTION, SOLUTION EPIDURAL; INFILTRATION; INTRACAUDAL; PERINEURAL at 13:19

## 2023-11-21 NOTE — PROGRESS NOTES
Patient ID: Aleksandr Toscano is a 58 y.o. male. Assessment/Plan:    59 y/o  Male who is here to follow-up for history of stroke. Patient has left MCA stroke s/p left ICA carotid stent, and right carotid stenosis and has a loop recorder placement for 3 years which did not show afib. Patient had two more small punctate strokes and etiology is unclear, patient was started on pletal at this time in addition to aspirin and brilinta. PLAN:      Diagnoses and all orders for this visit:    History of stroke  -for secondary stroke prevention, recommend continuation of combination of Aspirin, Brilinta, Pletal and Atorvastatin  -check P2y12 assay  -Blood Pressure goal < 130/80, BP is at goal in the office. -LDL goal <70  -I advised patient to avoid using NSAIDs for headaches or other pain and to stick to tylenol if needed  -Recommend lifestyle modifications such as mediterranean diet & regular exercise regimen atleast 4-5 times a week for 20-30 minutes. -I educated patient/family regarding medication compliance  -encourage smoking cessation, and control of diabetes and hypertension; defer management to primary   -     Ambulatory Referral to Ophthalmology; Future  -     P2Y12 Platelet inhibitor; Future    Carotid stenosis, bilateral  -continue Aspirin, Brilinta, pletal and atorvastatin  -     ticagrelor (BRILINTA) 90 MG; Take 1 tablet (90 mg total) by mouth every 12 (twelve) hours       Follow up in 2 months. I would be happy to see the patient sooner if any new questions/concerns arise. Patient/Guardian was advised to the call the office if they have any questions and concerns in the meantime.      Patient/Guardian does understand that if they have any new stroke like symptoms such as facial droop on one side, weakness/paralysis on either side, speech trouble, numbness on one side, balance issues, any vision changes, extreme dizziness or any new headache, to call 9-1-1 immediately or to proceed to the nearest ER immediately. I have spent a total time of 40 minutes on 11/21/23 in caring for this patient including Risks and benefits of tx options, Instructions for management, Documenting in the medical record, Reviewing / ordering tests, medicine, procedures  , and Obtaining or reviewing history  . Subjective:    HPI    This is a 57-year-old male who is here as a follow-up for history of stroke. Patient has multiple vascular risk factors history of left MCA stroke status post mechanical thrombectomy and ipsilateral ICA stenting, right MCA stroke status post thrombectomy with known residual right ICA stenosis and severe stenosis of the left M2 and right M1 segments. MRI brain was done at this time when he arrived to the hospital which showed 2 punctate acute infarcts. Patient was also found to have an infection and there was a concern for sepsis and hypotension at this time. Patient is on aspirin Brilinta Pletal and atorvastatin for stroke prevention. Does not have any episodes of zoning out anymore. He is currently getting home PT and OT and would like to continue. Patient is on aspirin Brilinta and Pletal right now and is tolerating it well without any issues. Patient is on Keppra 500 mg twice daily. No further seizures at this time. Does still have residual left upper extremity, left lower extremity weakness. The following portions of the patient's history were reviewed and updated as appropriate: He  has a past medical history of Depression, Diabetes mellitus ([address]), Dyslipidemia (03/26/2019), GERD (gastroesophageal reflux disease), Hyperlipidemia, Hypertension, Prediabetes, Prediabetes (04/03/2019), and Stroke ([address]).   He   Patient Active Problem List    Diagnosis Date Noted    Sepsis due to COVID-19 [facility]) 10/03/2023    Focal epilepsy ([address]) 08/31/2023    Claudication of both lower extremities ([address]) 08/18/2023    Type 2 diabetes mellitus with other diabetic ophthalmic complication ([address]) 07/20/2023    Aphasia 06/06/2023    Atherosclerosis of native arteries of extremities with intermittent claudication, bilateral legs ([address]) 06/06/2023    Benign prostatic hyperplasia with lower urinary tract symptoms 06/06/2023    Possible NPH (normal pressure hydrocephalus) ([address]) 06/02/2023    Muscle spasms of both lower extremities 06/02/2023    Multiple thyroid nodules 06/01/2023    Abnormal laboratory test 05/15/2023    History of tobacco use 05/12/2023    Chronic ischemic left MCA stroke 05/12/2023    Hypokalemia 05/12/2023    Infrarenal abdominal aortic aneurysm (AAA) without rupture ([address]) 05/08/2023    Tachycardia 05/05/2023    Prediabetes 04/27/2023    SIRS (systemic inflammatory response syndrome) ([address]) 04/27/2023    Chronic anticoagulation 04/26/2023    Stroke ([address]) 04/26/2023    Hyponatremia 04/26/2023    Middle cerebral artery stenosis, right 04/17/2023    Left posterior MCA stroke - etiology unclear at this time 04/17/2023    Chronic low back pain 04/16/2023    Hypertensive encephalopathy, transient 01/12/2023    Snoring 08/10/2020    Memory deficits 08/10/2020    Chronic ischemic right MCA stroke 08/06/2019    Status post placement of implantable loop recorder 04/06/2019    Type 2 diabetes mellitus ([address]) 04/03/2019    Anxiety and depression 03/29/2019    Insomnia 03/29/2019    Fall 03/28/2019    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 ([address]) 03/28/2019    Urinary retention 03/27/2019    At risk for venous thromboembolism (VTE) 03/26/2019    Hypertriglyceridemia 03/26/2019    Nicotine dependence 03/26/2019    Carotid stenosis, bilateral 03/26/2019    Presbyopia 03/26/2019    History of stroke 03/19/2019    Headache 03/19/2019    Primary hypertension 03/19/2019    GERD (gastroesophageal reflux disease) 03/19/2019     He  has a past surgical history that includes Back surgery; Shoulder surgery (Left); IR stroke alert (03/19/2019); IR cerebral angiography (05/04/2023);  Carpal tunnel release (Right); Arthroscopic repair ACL (Left); and US guided thyroid biopsy (11/21/2023). His family history includes Depression in his sister and sister; Diabetes in his mother; Diabetes unspecified in his mother and son; Heart attack in his mother; Hyperlipidemia in his brother, father, mother, sister, sister, and sister; Hypertension in his brother, brother, father, mother, sister, sister, and sister; Hypothyroidism in his daughter and son; Prostate cancer in his brother and father; Stroke in his father. He  reports that he has quit smoking. He has never used smokeless tobacco. He reports that he does not drink alcohol and does not use drugs. Current Outpatient Medications   Medication Sig Dispense Refill    acetaminophen (TYLENOL) 325 mg tablet Take 3 tablets (975 mg total) by mouth 3 (three) times a day as needed for mild pain, headaches or fever  0    aspirin (ECOTRIN LOW STRENGTH) 81 mg EC tablet Take 1 tablet (81 mg total) by mouth daily Do not start before April 19, 2023. 30 tablet 0    atorvastatin (LIPITOR) 40 mg tablet Take 40 mg by mouth daily with breakfast      baclofen 5 MG TABS Take 5 mg by mouth 2 (two) times a day as needed for muscle spasms  0    cilostazol (PLETAL) 100 mg tablet Take 1 tablet (100 mg total) by mouth 2 (two) times a day before meals Do not start before June 6, 2023.  0    donepezil (ARICEPT) 10 mg tablet Take 1 tablet (10 mg total) by mouth in the morning 60 tablet 2    hydrochlorothiazide (HYDRODIURIL) 12.5 mg tablet       levETIRAcetam (Keppra) 500 mg tablet Take 1 tab (500 mg) twice a day for 1 week, then take 2 tabs (1000 mg) twice a day.  360 tablet 3    lidocaine (Lidoderm) 5 % Apply 1 patch topically over 12 hours daily Remove & Discard patch within 12 hours or as directed by MD 30 patch 2    losartan (COZAAR) 50 mg tablet Take 50 mg by mouth daily      metoprolol succinate (TOPROL-XL) 100 mg 24 hr tablet Take 100 mg by mouth daily      pantoprazole (PROTONIX) 40 mg tablet Take 1 tablet (40 mg total) by mouth daily 90 tablet 2    tamsulosin (FLOMAX) 0.4 mg Take 1 capsule (0.4 mg total) by mouth daily with dinner 90 capsule 3    ticagrelor (BRILINTA) 90 MG Take 1 tablet (90 mg total) by mouth every 12 (twelve) hours 60 tablet 3    traZODone (DESYREL) 100 mg tablet Take 100 mg by mouth daily at bedtime       No current facility-administered medications for this visit. Current Outpatient Medications on File Prior to Visit   Medication Sig    acetaminophen (TYLENOL) 325 mg tablet Take 3 tablets (975 mg total) by mouth 3 (three) times a day as needed for mild pain, headaches or fever    aspirin (ECOTRIN LOW STRENGTH) 81 mg EC tablet Take 1 tablet (81 mg total) by mouth daily Do not start before April 19, 2023. atorvastatin (LIPITOR) 40 mg tablet Take 40 mg by mouth daily with breakfast    baclofen 5 MG TABS Take 5 mg by mouth 2 (two) times a day as needed for muscle spasms    cilostazol (PLETAL) 100 mg tablet Take 1 tablet (100 mg total) by mouth 2 (two) times a day before meals Do not start before June 6, 2023. donepezil (ARICEPT) 10 mg tablet Take 1 tablet (10 mg total) by mouth in the morning    hydrochlorothiazide (HYDRODIURIL) 12.5 mg tablet     levETIRAcetam (Keppra) 500 mg tablet Take 1 tab (500 mg) twice a day for 1 week, then take 2 tabs (1000 mg) twice a day. lidocaine (Lidoderm) 5 % Apply 1 patch topically over 12 hours daily Remove & Discard patch within 12 hours or as directed by MD    losartan (COZAAR) 50 mg tablet Take 50 mg by mouth daily    metoprolol succinate (TOPROL-XL) 100 mg 24 hr tablet Take 100 mg by mouth daily    pantoprazole (PROTONIX) 40 mg tablet Take 1 tablet (40 mg total) by mouth daily    tamsulosin (FLOMAX) 0.4 mg Take 1 capsule (0.4 mg total) by mouth daily with dinner    traZODone (DESYREL) 100 mg tablet Take 100 mg by mouth daily at bedtime     No current facility-administered medications on file prior to visit.      He is allergic to flexeril [cyclobenzaprine], lisinopril, and nsaids. .     Objective:    Blood pressure 120/70, pulse 60, height 5' 10" (1.778 m), weight 93 kg (205 lb). Physical Exam  General - patient is alert   Speech - no dysarthria noted, no aphasia noted. Neuro:   Cranial nerves: PERRL, EOMI, facial sensation intact to soft touch in V1, V2 and V3, no facial asymmetry noted, uvula/palate midline, tongue midline. Motor: LUE and LLE weakness noted 4-/5, and RUE and RLE 5/5   Reflexes - 2+ throughout  Coordination - no ataxia/dysmetria noted  Gait - ambulates with a walker. ROS:    Review of Systems   Constitutional:  Negative for appetite change, fatigue and fever. HENT: Negative. Negative for hearing loss, tinnitus, trouble swallowing and voice change. Eyes:  Positive for visual disturbance. Negative for photophobia and pain. Respiratory: Negative. Negative for shortness of breath. Cardiovascular: Negative. Negative for palpitations. Gastrointestinal: Negative. Negative for nausea and vomiting. Endocrine: Negative. Negative for cold intolerance. Genitourinary: Negative. Negative for dysuria, frequency and urgency. Musculoskeletal:  Positive for gait problem. Negative for back pain, myalgias and neck pain. Skin: Negative. Negative for rash. Allergic/Immunologic: Negative. Neurological:  Positive for headaches. Negative for dizziness, tremors, seizures, syncope, facial asymmetry, speech difficulty, weakness, light-headedness and numbness. Hematological: Negative. Does not bruise/bleed easily. Psychiatric/Behavioral: Negative. Negative for confusion, hallucinations and sleep disturbance.

## 2023-11-28 ENCOUNTER — PREP FOR PROCEDURE (OUTPATIENT)
Dept: CARDIOLOGY CLINIC | Facility: CLINIC | Age: 63
End: 2023-11-28

## 2023-11-28 DIAGNOSIS — I63.9 CEREBROVASCULAR ACCIDENT (CVA), UNSPECIFIED MECHANISM (HCC): Primary | ICD-10-CM

## 2023-11-28 PROCEDURE — 88173 CYTOPATH EVAL FNA REPORT: CPT | Performed by: PATHOLOGY

## 2023-11-28 PROCEDURE — 88172 CYTP DX EVAL FNA 1ST EA SITE: CPT | Performed by: PATHOLOGY

## 2023-12-02 PROBLEM — A41.89 SEPSIS DUE TO COVID-19 (HCC): Status: RESOLVED | Noted: 2023-10-03 | Resolved: 2023-12-02

## 2023-12-02 PROBLEM — U07.1 SEPSIS DUE TO COVID-19 (HCC): Status: RESOLVED | Noted: 2023-10-03 | Resolved: 2023-12-02

## 2023-12-06 ENCOUNTER — APPOINTMENT (OUTPATIENT)
Dept: LAB | Facility: CLINIC | Age: 63
End: 2023-12-06
Payer: COMMERCIAL

## 2023-12-06 ENCOUNTER — TELEPHONE (OUTPATIENT)
Dept: NEUROLOGY | Facility: CLINIC | Age: 63
End: 2023-12-06

## 2023-12-06 ENCOUNTER — NEW PATIENT COMPREHENSIVE (OUTPATIENT)
Dept: URBAN - METROPOLITAN AREA CLINIC 6 | Facility: CLINIC | Age: 63
End: 2023-12-06

## 2023-12-06 DIAGNOSIS — H47.611: ICD-10-CM

## 2023-12-06 DIAGNOSIS — G40.109 FOCAL EPILEPSY (HCC): ICD-10-CM

## 2023-12-06 DIAGNOSIS — H47.612: ICD-10-CM

## 2023-12-06 DIAGNOSIS — Z12.5 SCREENING FOR PROSTATE CANCER: ICD-10-CM

## 2023-12-06 DIAGNOSIS — I66.01 MIDDLE CEREBRAL ARTERY STENOSIS, RIGHT: ICD-10-CM

## 2023-12-06 DIAGNOSIS — H25.813: ICD-10-CM

## 2023-12-06 DIAGNOSIS — Z86.73 HISTORY OF STROKE: ICD-10-CM

## 2023-12-06 LAB
PA ADP BLD-ACNC: 13 PRU (ref 194–418)
PSA SERPL-MCNC: 1.95 NG/ML (ref 0–4)

## 2023-12-06 PROCEDURE — 36415 COLL VENOUS BLD VENIPUNCTURE: CPT

## 2023-12-06 PROCEDURE — 92083 EXTENDED VISUAL FIELD XM: CPT

## 2023-12-06 PROCEDURE — 85576 BLOOD PLATELET AGGREGATION: CPT

## 2023-12-06 PROCEDURE — G0103 PSA SCREENING: HCPCS

## 2023-12-06 PROCEDURE — 92004 COMPRE OPH EXAM NEW PT 1/>: CPT

## 2023-12-06 PROCEDURE — 80177 DRUG SCRN QUAN LEVETIRACETAM: CPT

## 2023-12-06 ASSESSMENT — TONOMETRY
OS_IOP_MMHG: 22
OD_IOP_MMHG: 20

## 2023-12-06 ASSESSMENT — VISUAL ACUITY
OD_SC: CF 4FT
OS_SC: CF 4FT

## 2023-12-06 NOTE — TELEPHONE ENCOUNTER
Patient spouse called to return a call from the medical staff concerning the patients Lab Results for the P2Y12 Platelet inhibitor     Relayed the message from the provider in the lab results message     Explained that a nurse had tried to Reach out to them via phone and left a message     Advised the patient spouse to inform the patient to look at the results in the My Chart and they will also see the message from the provider concerning the results     Patient spouse expressed understanding and ended the call

## 2023-12-08 LAB — LEVETIRACETAM SERPL-MCNC: 32.6 UG/ML (ref 10–40)

## 2023-12-12 ENCOUNTER — OFFICE VISIT (OUTPATIENT)
Dept: NEUROLOGY | Facility: CLINIC | Age: 63
End: 2023-12-12
Payer: COMMERCIAL

## 2023-12-12 VITALS
HEART RATE: 80 BPM | BODY MASS INDEX: 27.92 KG/M2 | WEIGHT: 195 LBS | DIASTOLIC BLOOD PRESSURE: 58 MMHG | TEMPERATURE: 97.3 F | HEIGHT: 70 IN | SYSTOLIC BLOOD PRESSURE: 128 MMHG

## 2023-12-12 DIAGNOSIS — G40.109 FOCAL EPILEPSY (HCC): Primary | ICD-10-CM

## 2023-12-12 DIAGNOSIS — I63.9 RECURRENT STROKES (HCC): ICD-10-CM

## 2023-12-12 PROCEDURE — 99214 OFFICE O/P EST MOD 30 MIN: CPT | Performed by: NURSE PRACTITIONER

## 2023-12-12 NOTE — LETTER
December 12, 2023     Constanza Zhu  [address]  [facility] [address]    Patient: Constanza Zhu   YOB: 1960   Date of Visit: 12/12/2023       To whom it may concern,       Given Constanza Zhu's medical issues and prior stroke with aphasia. Please refer to POA for assistance with decision making and communicating his healthcare, financial, and insurance needs.          If you have questions, please do not hesitate to call me.        Sincerely,        ERIC Price

## 2023-12-12 NOTE — PATIENT INSTRUCTIONS
- Continue current medication  - Have EEG completed  - See [facility] for second opinion  - Follow up with Endocrinology regarding results of thyroid biopsy  - Call the office with seizures or concerns  - Follow up with Dr Brasher in 4 months   - Keep appointment with Dr Steele

## 2023-12-12 NOTE — PROGRESS NOTES
Patient ID: Constanza Zhu is a 63 y.o. male with history of stroke and episodes of zoning out concerning for seizure, who is returning to Neurology office for follow up of his seizures.       Assessment/Plan:    Focal epilepsy (HCC)  Patient with focal epilepsy in the setting of bilateral MCA strokes.  He is currently taking levetiracetam 1000 mg twice per day without recurrence of episodes concerning for seizure.  They deny any episodes of zoning out since being on this medication.  Prior EEG 7/5/2023 with polymorphic delta activity over the left frontotemporal region.  Recommend repeat EEG.    Patient will continue with current dose of levetiracetam 1000 mg twice per day.  If there are further events concerning for seizure his dose of levetiracetam could likely be increased.  They will call the office with further events or concerns.  Follow-up in 4 months or sooner if needed with Dr. Brasher.    Recurrent strokes (HCC)  Following closely with Dr. Steele.  Given referral to neuro-ophthalmology at request of patient's wife given visual field deficits.  Seen by neuro-ophthalmology but would like second opinion.  Referral placed to [facility].        He will Return in about 4 months (around 4/12/2024) for With Dr Brasher, keep appt with Dr Steele as scheduled.      Subjective:  Constanza Zhu is a 63 y.o. male with history of bilateral MCA strokes and episodes of zoning out concerning for seizure, who is returning to Neurology office for follow up of his seizures. He also has a PMH of GERD, DM2, thyroid nodules, HTN, bilateral carotid stenosis, AAA, atherosclerosis with intermittent claudication of bilateral legs, urinary retention, nicotine dependence, loop recorder in place, and SIRS. Seen by Dr Brasher for initial consultation on 8/31/23 due to episodes concerning for seizure. Recommended starting levetiracetam to goal dose of 1000 mg BID and having a routine EEG prior to his next appointment. . He is also a  patient of Dr Steele regarding his strokes. At her last visit  on 11/21/23, noted history of left MCA stroke  status post mechanical thrombectomy and ipsilateral ICA stenting, right MCA stroke status post thrombectomy with known residual right ICA stenosis and severe stenosis of the left M2 and right M1 segments.  Prior MRI when he arrived to the hospital which showed 2 punctate acute infarcts. Patient was also found to have an infection and there was a concern for sepsis and hypotension at this time.  Patient was on aspirin Brilinta Pletal and atorvastatin for stroke prevention at that time. Has follow up with Dr Steele scheduled 1/23/24.     Since their last visit, he has been taking levetiracetam 2 tablets twice per day (1000 mg BID). THere have been no further episodes of zoning. Last 5-15 mnutes. Afterwards he seemed ot be confused. If spoken to he either wouldn't say anything or the response was not appropriate. Would randomly occur. No further episodes since being on the medicaiton.     They would like to go to [facility]. Saw someone local but would like to see neuro-ophthalmology to see about new treatment options.     Wife reports that she is his POA and there have been issues with agencies understanding his prior stroke an aphasia and that she needs to assist with making decisions due to speech difficulties. Letter provided.       Current seizure medications:  - levetiracetam 1000 mg BID  Other medications as per Epic.  Levetiracetam level 32.6    Semiology: episodes of staring and zoning out where he can not see or hear, can last hours but sometimes does not remember. Went to ER with one and was unresponsive. Initially occurring daily prior to medication.     Epilepsy Risk factors: bilateral MCA strokes    Prior Workup:  - MRI brain wo contrast 10/5/23:  FINDINGS:  BRAIN PARENCHYMA: Few punctate foci of restricted diffusion in the left parietal region within the area of the previous infarct.  Surrounding gliosis and encephalomalacia in the left parietal and superior temporal region.  No intracranial hemorrhage or mass effect.  Stable periventricular and subcortical T2/FLAIR hyperintense foci consistent with advanced microangiopathic disease. Chronic lacunar infarcts in the right periventricular white matter.  VENTRICLES: Stable ex vacuo dilatation of the ventricles and sulci. No hydrocephalus or extra-axial collection  SELLA AND PITUITARY GLAND:  Normal.  ORBITS:  Normal.  PARANASAL SINUSES: Mild mucosal thickening of the major intracranial vasculature demonstrates appropriate flow voids.  CALVARIUM AND SKULL BASE:  Normal.  EXTRACRANIAL SOFT TISSUES:  Normal  IMPRESSION:  Few punctate foci of restricted diffusion within the chronic left MCA territory infarct in the parietal temporal region, possibly indicating acute to subacute infarcts. No mass effect or hemorrhagic conversion.  The study was marked in EPIC for immediate notification.    CTA head and neck 9/10/23:  IMPRESSION:   1. Stable mild to moderate stenosis at the right ICA origin.  2. Patent left ICA stent with mild narrowing.  3. Stable severe stenosis in the distal M1/proximal right M2 branch. No evidence of large vessel occlusion or acute thrombus.  4. No evidence of acute vascular territorial infarction, intracranial hemorrhage or mass.    Routine EEG 7/5/23:  Interpretation:   This is an abnormal 28 minutes awake EEG due to nearly continuous polymorphic delta activity over the left frontotemporal region, attenuation of faster activity over the left hemisphere, slow posterior rhythm (theta range), and intermittent diffuse delta activity.  These findings  Indicate the presence of a structural lesion over the left frontotemporal region superimposed on mild-moderate diffuse cerebral dysfunction.    Prior Seizure Medications: none    His history was also obtained from his wife, who was present at today's visit.        I reviewed prior neurology  "notes, most recent labs, as documented in Epic/Pivot3, and summarized above.        Objective:    Blood pressure 128/58, pulse 80, temperature (!) 97.3 °F (36.3 °C), temperature source Temporal, height 5' 10\" (1.778 m), weight 88.5 kg (195 lb).    Physical Exam  No apparent distress.   Appears well nourished.   Mood appropriate for situation     Neurologic Exam  Mental status- alert and oriented to person, place, and time. Mild expressive/receptive aphasia (Wife confirms this is baseline speech).  Object naming intact.     Cranial Nerves- PERRL, EOMS normal, facial muscles symmetric, hearing intact bilaterally to finger rubs, tongue midline, palate rise symmetrical, shoulder shrug symmetrical.    Attempted visual field testing but due to expressive/receptive aphasia this was difficult to determine clear cur but definite right lower visual field absent.       Motor- Moves all extremities . No tremor.    Sensory-  Intact distally in all extremities to light touch.     DTRs- 2+ and symmetric in all extremities.     Gait- seated in wheelchair    Coordination- FNF intact.         ROS:       Review of Systems   Constitutional:  Negative for appetite change, fatigue and fever.   HENT: Negative.  Negative for hearing loss, tinnitus, trouble swallowing and voice change.    Eyes: Negative.  Negative for photophobia, pain and visual disturbance.   Respiratory: Negative.  Negative for shortness of breath.    Cardiovascular: Negative.  Negative for palpitations.   Gastrointestinal: Negative.  Negative for nausea and vomiting.   Endocrine: Negative.  Negative for cold intolerance.   Genitourinary: Negative.  Negative for dysuria, frequency and urgency.   Musculoskeletal:  Negative for back pain, gait problem, myalgias and neck pain.   Skin: Negative.  Negative for rash.   Allergic/Immunologic: Negative.    Neurological: Negative.  Negative for dizziness, tremors, seizures, syncope, facial asymmetry, speech difficulty, " weakness, light-headedness, numbness and headaches.   Hematological: Negative.  Does not bruise/bleed easily.   Psychiatric/Behavioral: Negative.  Negative for confusion, hallucinations and sleep disturbance.    All other systems reviewed and are negative.             ROS obtained by MA and reviewed by myself.       This note may have been created using voice recognition software. There may be unintentional errors such as grammatical errors, spelling errors, or pronoun errors.

## 2023-12-12 NOTE — PROGRESS NOTES
Review of Systems   Constitutional:  Negative for appetite change, fatigue and fever.   HENT: Negative.  Negative for hearing loss, tinnitus, trouble swallowing and voice change.    Eyes: Negative.  Negative for photophobia, pain and visual disturbance.   Respiratory: Negative.  Negative for shortness of breath.    Cardiovascular: Negative.  Negative for palpitations.   Gastrointestinal: Negative.  Negative for nausea and vomiting.   Endocrine: Negative.  Negative for cold intolerance.   Genitourinary: Negative.  Negative for dysuria, frequency and urgency.   Musculoskeletal:  Negative for back pain, gait problem, myalgias and neck pain.   Skin: Negative.  Negative for rash.   Allergic/Immunologic: Negative.    Neurological: Negative.  Negative for dizziness, tremors, seizures, syncope, facial asymmetry, speech difficulty, weakness, light-headedness, numbness and headaches.   Hematological: Negative.  Does not bruise/bleed easily.   Psychiatric/Behavioral: Negative.  Negative for confusion, hallucinations and sleep disturbance.    All other systems reviewed and are negative.

## 2023-12-13 ENCOUNTER — TELEPHONE (OUTPATIENT)
Dept: NEUROLOGY | Facility: CLINIC | Age: 63
End: 2023-12-13

## 2023-12-13 ENCOUNTER — HOSPITAL ENCOUNTER (OUTPATIENT)
Dept: CT IMAGING | Facility: HOSPITAL | Age: 63
Discharge: HOME | End: 2023-12-13
Attending: NEUROLOGICAL SURGERY
Payer: COMMERCIAL

## 2023-12-13 DIAGNOSIS — I63.9 RECURRENT STROKES (HCC): ICD-10-CM

## 2023-12-13 DIAGNOSIS — G91.2 NPH (NORMAL PRESSURE HYDROCEPHALUS) (HCC): ICD-10-CM

## 2023-12-13 DIAGNOSIS — I65.23 BILATERAL CAROTID ARTERY STENOSIS: ICD-10-CM

## 2023-12-13 PROCEDURE — G1004 CDSM NDSC: HCPCS

## 2023-12-13 PROCEDURE — 70496 CT ANGIOGRAPHY HEAD: CPT

## 2023-12-13 PROCEDURE — 70498 CT ANGIOGRAPHY NECK: CPT

## 2023-12-13 RX ADMIN — IOHEXOL 85 ML: 350 INJECTION, SOLUTION INTRAVENOUS at 10:18

## 2023-12-18 DIAGNOSIS — E04.2 MULTIPLE THYROID NODULES: Primary | ICD-10-CM

## 2023-12-20 ENCOUNTER — OFFICE VISIT (OUTPATIENT)
Dept: NEUROSURGERY | Facility: CLINIC | Age: 63
End: 2023-12-20
Payer: COMMERCIAL

## 2023-12-20 VITALS
HEART RATE: 67 BPM | WEIGHT: 195 LBS | BODY MASS INDEX: 27.92 KG/M2 | HEIGHT: 70 IN | OXYGEN SATURATION: 98 % | DIASTOLIC BLOOD PRESSURE: 92 MMHG | SYSTOLIC BLOOD PRESSURE: 132 MMHG | TEMPERATURE: 98.3 F

## 2023-12-20 DIAGNOSIS — I66.01 MIDDLE CEREBRAL ARTERY STENOSIS, RIGHT: ICD-10-CM

## 2023-12-20 DIAGNOSIS — Z01.812 PRE-PROCEDURAL LABORATORY EXAMINATIONS: ICD-10-CM

## 2023-12-20 DIAGNOSIS — Z86.73 CHRONIC ISCHEMIC LEFT MCA STROKE: ICD-10-CM

## 2023-12-20 DIAGNOSIS — G91.2 NPH (NORMAL PRESSURE HYDROCEPHALUS) (HCC): ICD-10-CM

## 2023-12-20 DIAGNOSIS — I63.9 CEREBROVASCULAR ACCIDENT (CVA), UNSPECIFIED MECHANISM (HCC): Primary | ICD-10-CM

## 2023-12-20 PROCEDURE — 99215 OFFICE O/P EST HI 40 MIN: CPT | Performed by: NEUROLOGICAL SURGERY

## 2023-12-20 NOTE — PROGRESS NOTES
Patient Id: Constanza Zhu is a 63 y.o. male        Handedness: Right      Assessment/Plan:    Diagnoses and all orders for this visit:    Cerebrovascular accident (CVA), unspecified mechanism (HCC)  -     CTA head and neck w wo contrast; Future    Possible NPH (normal pressure hydrocephalus) (HCC)  -     Ambulatory Referral to Neurosurgery  -     CTA head and neck w wo contrast; Future    Middle cerebral artery stenosis, right  -     CTA head and neck w wo contrast; Future    Chronic ischemic left MCA stroke  -     CTA head and neck w wo contrast; Future        Discussion and summary:   1.  Symptomatic left carotid stenosis status post angioplasty and stenting 5/4/2023  Most recent CTA demonstrates some in-stent stenosis.  There was concern for TIA/some ischemic changes after an episode of COVID back in March.  He is now on triple antiplatelet therapy with Pletal, Brilinta and aspirin.  His P2 Y12 was therapeutic.  His modified Jefferson score remains of 4.  He has follow-up with neurology this month.  From my standpoint can transition back to dual antiplatelet therapy would like to repeat a CTA in 6 months to ensure stability of his intraluminal narrowing, if it is stable we will transition to carotid Dopplers.    2.  Asymptomatic right carotid stenosis measuring 60 to 70%  Cont medical management.  No intervention at this time     3.  Prior history of right MCA thrombectomy with evidence of intracranial stenosis   This is stable.  I would continue dual antiplatelet therapy.    4.  Concern for normal pressure hydrocephalus.  There is radiographic concern for normal pressure hydrocephalus.  He does not present with all the symptoms of the triad.  He does have some mild memory difficulties but his ambulation has improved and he has no evidence of urinary incontinence.  I believe that this radiographically demonstrates more ex-vacuo dilatation than NPH.  Furthermore given his triple antiplatelet therapy he is not a  candidate for lumbar puncture or shunting.  I believe as we move forward and he continues to improve we can further address this as needed.  Overall given his risk for surgical intervention we will continue to monitor this.    I have spent a total time of 45 minutes on 12/20/23 in caring for this patient including Diagnostic results, Prognosis, Risks and benefits of tx options, Instructions for management, Patient and family education, Importance of tx compliance, Impressions, Counseling / Coordination of care, Documenting in the medical record, Reviewing / ordering tests, medicine, procedures  , Obtaining or reviewing history  , and Communicating with other healthcare professionals .       Chief Complaint: Follow-up (6 mos/12/13/2023-CTA H+N)        HPI: This is an unfortunate 63-year-old gentleman who initially presented in 2019 requiring a right MCA thrombectomy and had significant improvement.  Unfortunately he represented approximately 2 months ago with a new left MCA stroke and significant bilateral carotid stenosis.  He underwent a left carotid angioplasty and stenting which was without complication.  He was ultimately discharged from the hospital to rehab.  He is now at home.    He has made significant progress at home.  He walks with a walker.  He continues to have a right field cut.  He has expressive difficulties.  He had COVID back in October and has had difficulty recovering from this.      He is allergic to lisinopril.     He is .  He has 1 son that is 40 and 1 daughter that is 39.  He is currently disabled. He has not smoked since onset of hospitalization.  He does not drink alcohol or use illicit drugs.  He has 3 maternal uncles that had strokes. His father also suffered a stroke.  His father was adopted so extended family history is unknown.    Review of systems obtained by the MA reviewed and updated below.    Review of Systems   HENT: Negative.  Negative for tinnitus.    Eyes:  Positive for  visual disturbance.   Respiratory: Negative.     Cardiovascular: Negative.    Gastrointestinal: Negative.    Endocrine: Negative.    Genitourinary: Negative.    Musculoskeletal:  Negative for gait problem, neck pain and neck stiffness.   Skin: Negative.    Allergic/Immunologic: Negative.    Neurological:  Positive for speech difficulty (word finding/stuttering). Negative for dizziness, light-headedness, numbness and headaches.        Short and long term memory issues     Hematological:  Bruises/bleeds easily (medication).   Psychiatric/Behavioral:  Positive for confusion and decreased concentration. Negative for sleep disturbance.        Physical Exam  Vitals:    12/20/23 1444   BP: 132/92   Pulse: 67   Temp: 98.3 °F (36.8 °C)   SpO2: 98%   He is in a wheelchair.  He is awake alert and oriented.  He has some word finding difficulties as well as paraphasic errors.  He can name objects with difficulty.  He follows commands with good strength bilaterally.  He does have some right-sided weakness.    The following portions of the patient's history were reviewed and updated as appropriate: allergies, current medications, past family history, past medical history, past social history, past surgical history, and problem list.    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) 03/28/2019    Anxiety and depression 03/29/2019    Insomnia 03/29/2019    Type 2 diabetes mellitus (HCC) 04/03/2019    Status post placement of implantable loop recorder 04/06/2019    Chronic ischemic right MCA  stroke 08/06/2019    Snoring 08/10/2020    Memory deficits 08/10/2020    Hypertensive encephalopathy, transient 01/12/2023    Chronic low back pain 04/16/2023    Middle cerebral artery stenosis, right 04/17/2023    Left posterior MCA stroke - etiology unclear at this time 04/17/2023    Chronic anticoagulation 04/26/2023    Stroke (HCC) 04/26/2023    Hyponatremia 04/26/2023    Prediabetes 04/27/2023    SIRS (systemic inflammatory response syndrome) ([facility]) 04/27/2023    Tachycardia 05/05/2023    Infrarenal abdominal aortic aneurysm (AAA) without rupture ([facility]) 05/08/2023    History of tobacco use 05/12/2023    Chronic ischemic left MCA stroke 05/12/2023    Hypokalemia 05/12/2023    Abnormal laboratory test 05/15/2023    Multiple thyroid nodules 06/01/2023    Possible NPH (normal pressure hydrocephalus) ([facility]) 06/02/2023    Muscle spasms of both lower extremities 06/02/2023    Aphasia 06/06/2023    Atherosclerosis of native arteries of extremities with intermittent claudication, bilateral legs ([facility]) 06/06/2023    Benign prostatic hyperplasia with lower urinary tract symptoms 06/06/2023    Type 2 diabetes mellitus with other diabetic ophthalmic complication ([facility]) 07/20/2023    Claudication of both lower extremities ([facility]) 08/18/2023    Focal epilepsy ([facility]) 08/31/2023     Resolved Ambulatory Problems     Diagnosis Date Noted    Hyperglycemia 03/19/2019    Dyslipidemia 03/26/2019    Arthropathy of left shoulder 03/28/2019    Vascular dementia ([facility]) 03/02/2021    Leukocytosis 05/12/2023    Sepsis due to COVID-19 ([facility]) 10/03/2023     Past Medical History:   Diagnosis Date    Depression     Diabetes mellitus ([facility])     Hyperlipidemia     Hypertension        Past Surgical History:   Procedure Laterality Date    ARTHROSCOPIC REPAIR ACL Left     BACK SURGERY      twice    CARPAL TUNNEL RELEASE Right     IR CEREBRAL ANGIOGRAPHY  05/04/2023    IR STROKE ALERT  03/19/2019    SHOULDER SURGERY Left     US GUIDED THYROID BIOPSY   11/21/2023         Current Outpatient Medications:     acetaminophen (TYLENOL) 325 mg tablet, Take 3 tablets (975 mg total) by mouth 3 (three) times a day as needed for mild pain, headaches or fever, Disp: , Rfl: 0    aspirin (ECOTRIN LOW STRENGTH) 81 mg EC tablet, Take 1 tablet (81 mg total) by mouth daily Do not start before April 19, 2023., Disp: 30 tablet, Rfl: 0    atorvastatin (LIPITOR) 40 mg tablet, Take 40 mg by mouth daily with breakfast, Disp: , Rfl:     baclofen 5 MG TABS, Take 5 mg by mouth 2 (two) times a day as needed for muscle spasms, Disp: , Rfl: 0    cilostazol (PLETAL) 100 mg tablet, Take 1 tablet (100 mg total) by mouth 2 (two) times a day before meals Do not start before June 6, 2023., Disp: , Rfl: 0    donepezil (ARICEPT) 10 mg tablet, Take 1 tablet (10 mg total) by mouth in the morning, Disp: 60 tablet, Rfl: 2    hydrochlorothiazide (HYDRODIURIL) 12.5 mg tablet, , Disp: , Rfl:     levETIRAcetam (Keppra) 500 mg tablet, Take 1 tab (500 mg) twice a day for 1 week, then take 2 tabs (1000 mg) twice a day., Disp: 360 tablet, Rfl: 3    losartan (COZAAR) 50 mg tablet, Take 50 mg by mouth daily, Disp: , Rfl:     MELATONIN GUMMIES PO, Take 5 mg by mouth daily at bedtime, Disp: , Rfl:     metoprolol succinate (TOPROL-XL) 100 mg 24 hr tablet, Take 100 mg by mouth daily, Disp: , Rfl:     pantoprazole (PROTONIX) 40 mg tablet, Take 1 tablet (40 mg total) by mouth daily (Patient taking differently: Take 20 mg by mouth daily), Disp: 90 tablet, Rfl: 2    tamsulosin (FLOMAX) 0.4 mg, Take 1 capsule (0.4 mg total) by mouth daily with dinner, Disp: 90 capsule, Rfl: 3    ticagrelor (BRILINTA) 90 MG, Take 1 tablet (90 mg total) by mouth every 12 (twelve) hours, Disp: 60 tablet, Rfl: 3    traZODone (DESYREL) 100 mg tablet, Take 100 mg by mouth daily at bedtime, Disp: , Rfl:     lidocaine (Lidoderm) 5 %, Apply 1 patch topically over 12 hours daily Remove & Discard patch within 12 hours or as directed by MD, Disp: 30  patch, Rfl: 2    Results/Data: Imaging personally reviewed in detail with patient as well as reports. Of note, stable right MCA stenosis as well as left carotid stent narrowing which is stable..

## 2023-12-26 NOTE — ASSESSMENT & PLAN NOTE
Patient with focal epilepsy in the setting of bilateral MCA strokes.  He is currently taking levetiracetam 1000 mg twice per day without recurrence of episodes concerning for seizure.  They deny any episodes of zoning out since being on this medication.  Prior EEG 7/5/2023 with polymorphic delta activity over the left frontotemporal region.  Recommend repeat EEG.    Patient will continue with current dose of levetiracetam 1000 mg twice per day.  If there are further events concerning for seizure his dose of levetiracetam could likely be increased.  They will call the office with further events or concerns.  Follow-up in 4 months or sooner if needed with Dr. Brasher.

## 2023-12-26 NOTE — ASSESSMENT & PLAN NOTE
Following closely with Dr. Steele.  Given referral to neuro-ophthalmology at request of patient's wife given visual field deficits.  Seen by neuro-ophthalmology but would like second opinion.  Referral placed to [facility] eye.

## 2024-01-19 ENCOUNTER — TELEPHONE (OUTPATIENT)
Dept: NEUROLOGY | Facility: CLINIC | Age: 64
End: 2024-01-19

## 2024-01-19 NOTE — TELEPHONE ENCOUNTER
Patient wife asked to if they can do a virtual for his appointment  Dr. Steele approved. Appt change to an virtual

## 2024-01-23 ENCOUNTER — TELEMEDICINE (OUTPATIENT)
Dept: NEUROLOGY | Facility: CLINIC | Age: 64
End: 2024-01-23
Payer: COMMERCIAL

## 2024-01-23 ENCOUNTER — TELEPHONE (OUTPATIENT)
Dept: NEUROLOGY | Facility: CLINIC | Age: 64
End: 2024-01-23

## 2024-01-23 DIAGNOSIS — Z95.828 INTERNAL CAROTID ARTERY STENT PRESENT: ICD-10-CM

## 2024-01-23 DIAGNOSIS — H04.123 DRY EYES: ICD-10-CM

## 2024-01-23 DIAGNOSIS — I63.9 RECURRENT STROKES (HCC): Primary | ICD-10-CM

## 2024-01-23 DIAGNOSIS — Z86.73 CHRONIC ISCHEMIC LEFT MCA STROKE: ICD-10-CM

## 2024-01-23 DIAGNOSIS — Z86.73 CHRONIC ISCHEMIC RIGHT MCA STROKE: ICD-10-CM

## 2024-01-23 DIAGNOSIS — G40.109 FOCAL EPILEPSY (HCC): ICD-10-CM

## 2024-01-23 DIAGNOSIS — H54.7 VISION PROBLEM: ICD-10-CM

## 2024-01-23 DIAGNOSIS — Z95.818 STATUS POST PLACEMENT OF IMPLANTABLE LOOP RECORDER: ICD-10-CM

## 2024-01-23 PROCEDURE — 99215 OFFICE O/P EST HI 40 MIN: CPT | Performed by: PSYCHIATRY & NEUROLOGY

## 2024-01-23 NOTE — TELEPHONE ENCOUNTER
Left message for wife inform her. That the patient f/u is scheduled for 04/23 at 11 am virtual with Dr. Steele.

## 2024-01-23 NOTE — ASSESSMENT & PLAN NOTE
-s/p left ICA stent, patient following neurosurgery  -for secondary stroke prevention, recommend continuation of combination of aspirin, Brilinta, and atorvastatin, will discontinue pletal (cilastazol)  -ok to hold aspirin, and brilinta 5-7 days prior to the procedure.   -Blood Pressure goal < 130/80  -I advised patient to avoid using NSAIDs for headaches or other pain and to stick to tylenol if needed  -Recommend lifestyle modifications such as mediterranean diet & regular exercise regimen atleast 4-5 times a week for 20-30 minutes.   -I educated patient/family regarding medication compliance  -encourage smoking cessation, and control of diabetes and hypertension; defer management to primary

## 2024-01-23 NOTE — PROGRESS NOTES
Virtual Regular Visit    Verification of patient location: [place]     Patient is located at Home in the following state in which I hold an active license [place]      Assessment/Plan:    Problem List Items Addressed This Visit          Cardiovascular and Mediastinum    Recurrent strokes (HCC) - Primary    Chronic ischemic left MCA stroke     -s/p left ICA stent, patient following neurosurgery  -for secondary stroke prevention, recommend continuation of combination of aspirin, Brilinta, and atorvastatin, will discontinue pletal (cilastazol)  -ok to hold aspirin, and brilinta 5-7 days prior to the procedure.   -Blood Pressure goal < 130/80  -I advised patient to avoid using NSAIDs for headaches or other pain and to stick to tylenol if needed  -Recommend lifestyle modifications such as mediterranean diet & regular exercise regimen atleast 4-5 times a week for 20-30 minutes.   -I educated patient/family regarding medication compliance  -encourage smoking cessation, and control of diabetes and hypertension; defer management to primary          RESOLVED: Chronic ischemic right MCA stroke       Nervous and Auditory    Focal epilepsy (HCC)     Continue with Keppra 500mg PO BID  Will defer management with epilepsy team            Other    Status post placement of implantable loop recorder    Internal carotid artery stent present    Vision problem    Dry eyes     -recommend artificial eye drops.           Follow up in 4-6 months     I would be happy to see the patient sooner if any new questions/concerns arise.  Patient/Guardian was advised to the call the office if they have any questions and concerns in the meantime.     Patient/Guardian does understand that if they have any new stroke like symptoms such as facial droop on one side, weakness/paralysis on either side, speech trouble, numbness on one side, balance issues, any vision changes, extreme dizziness or any new headache, to call 9-1-1 immediately or to proceed to the nearest  ER immediately.         Reason for visit is   Chief Complaint   Patient presents with    Virtual Regular Visit          Encounter provider Maddie Steele MD    Provider located at [facility]  [address]  [facility] [id number]      Recent Visits  Date Type Provider Dept   01/19/24 Telephone Maddie Steele MD [facility] [place]   Showing recent visits within past 7 days and meeting all other requirements  Today's Visits  Date Type Provider Dept   01/23/24 Telephone Maddie Steele MD [facility] [place]   01/23/24 Telemedicine Maddie Steele MD [facility]   Showing today's visits and meeting all other requirements  Future Appointments  No visits were found meeting these conditions.  Showing future appointments within next 150 days and meeting all other requirements       The patient was identified by name and date of birth. Constanza Zhu was informed that this is a telemedicine visit and that the visit is being conducted through the Epic Embedded platform. He agrees to proceed..  My office door was closed. No one else was in the room.  He acknowledged consent and understanding of privacy and security of the video platform. The patient has agreed to participate and understands they can discontinue the visit at any time.    Patient is aware this is a billable service.     Subjective  Constanza Zhu is a 63 y.o. male who is a follow-up for symptomatic left carotid stenosis status post angioplasty and stenting 5/4/2023, prior left MCA stroke.  Etiology of the stroke is to be secondary to left carotid stenosis. patient had a loop recorder placed and is getting it explanted next month.    Is otherwise doing well.  He is also following up with neurosurgery and their plan was to switch from triple antiplatelet therapy to dual antiplatelet therapy.  He is currently maintained on aspirin Brilinta and Pletal and doing ok. No  bleeding issues otherwise.     He just graduated with [employer] health, but will be starting outpatient therapy (physical, occupational and speech), and has referrals in place. Wife is with him, and she is waiting to see [facility] doctor in March, and the biggest issue with visual deficit, he is missing some spots and peripheral visual deficit. He is ambulating now and is able to feed himself, and is able to go to the bathroom by himself. He denies any new TIA/CVA like symptoms, and he is complaint with his medications.     HPI     Past Medical History:   Diagnosis Date    Depression     Diabetes mellitus (HCC)     patient denies    Dyslipidemia 03/26/2019    GERD (gastroesophageal reflux disease)     Hyperlipidemia     Hypertension     Prediabetes     Prediabetes 04/03/2019    Stroke ([facility])        Past Surgical History:   Procedure Laterality Date    ARTHROSCOPIC REPAIR ACL Left     BACK SURGERY      twice    CARPAL TUNNEL RELEASE Right     IR CEREBRAL ANGIOGRAPHY  05/04/2023    IR STROKE ALERT  03/19/2019    SHOULDER SURGERY Left     US GUIDED THYROID BIOPSY  11/21/2023       Current Outpatient Medications   Medication Sig Dispense Refill    acetaminophen (TYLENOL) 325 mg tablet Take 3 tablets (975 mg total) by mouth 3 (three) times a day as needed for mild pain, headaches or fever  0    aspirin (ECOTRIN LOW STRENGTH) 81 mg EC tablet Take 1 tablet (81 mg total) by mouth daily Do not start before April 19, 2023. 30 tablet 0    atorvastatin (LIPITOR) 40 mg tablet Take 40 mg by mouth daily with breakfast      baclofen 5 MG TABS Take 5 mg by mouth 2 (two) times a day as needed for muscle spasms  0    donepezil (ARICEPT) 10 mg tablet Take 1 tablet (10 mg total) by mouth in the morning 60 tablet 2    hydrochlorothiazide (HYDRODIURIL) 12.5 mg tablet       levETIRAcetam (Keppra) 500 mg tablet Take 1 tab (500 mg) twice a day for 1 week, then take 2 tabs (1000 mg) twice a day. 360 tablet 3    lidocaine (Lidoderm) 5 % Apply 1  patch topically over 12 hours daily Remove & Discard patch within 12 hours or as directed by MD 30 patch 2    losartan (COZAAR) 50 mg tablet Take 50 mg by mouth daily      MELATONIN GUMMIES PO Take 5 mg by mouth daily at bedtime      metoprolol succinate (TOPROL-XL) 100 mg 24 hr tablet Take 100 mg by mouth daily      pantoprazole (PROTONIX) 40 mg tablet Take 1 tablet (40 mg total) by mouth daily (Patient taking differently: Take 20 mg by mouth daily) 90 tablet 2    tamsulosin (FLOMAX) 0.4 mg Take 1 capsule (0.4 mg total) by mouth daily with dinner 90 capsule 3    ticagrelor (BRILINTA) 90 MG Take 1 tablet (90 mg total) by mouth every 12 (twelve) hours 60 tablet 3    traZODone (DESYREL) 100 mg tablet Take 100 mg by mouth daily at bedtime       No current facility-administered medications for this visit.        Allergies   Allergen Reactions    Flexeril [Cyclobenzaprine] Drowsiness    Lisinopril Cough    Nsaids Confusion       Review of Systems   Constitutional:  Negative for appetite change, fatigue and fever.   HENT: Negative.  Negative for hearing loss, tinnitus, trouble swallowing and voice change.    Eyes: Negative.  Negative for photophobia, pain and visual disturbance.   Respiratory: Negative.  Negative for shortness of breath.    Cardiovascular: Negative.  Negative for palpitations.   Gastrointestinal: Negative.  Negative for nausea and vomiting.   Endocrine: Negative.  Negative for cold intolerance.   Genitourinary: Negative.  Negative for dysuria, frequency and urgency.   Musculoskeletal:  Negative for back pain, gait problem, myalgias, neck pain and neck stiffness.   Skin: Negative.  Negative for rash.   Allergic/Immunologic: Negative.    Neurological: Negative.  Negative for dizziness, tremors, seizures, syncope, facial asymmetry, speech difficulty, weakness, light-headedness, numbness and headaches.   Hematological: Negative.  Does not bruise/bleed easily.   Psychiatric/Behavioral: Negative.  Negative for  confusion, hallucinations and sleep disturbance.        Video Exam    There were no vitals filed for this visit.    Physical Exam     Visit Time  Total Visit Duration: 40

## 2024-02-07 ENCOUNTER — APPOINTMENT (EMERGENCY)
Dept: CT IMAGING | Facility: HOSPITAL | Age: 64
DRG: 177 | End: 2024-02-07
Payer: COMMERCIAL

## 2024-02-07 ENCOUNTER — HOSPITAL ENCOUNTER (INPATIENT)
Facility: HOSPITAL | Age: 64
LOS: 10 days | Discharge: NURSING, REHAB OR LONG-TERM CARE | DRG: 177 | End: 2024-02-19
Attending: EMERGENCY MEDICINE | Admitting: INTERNAL MEDICINE
Payer: COMMERCIAL

## 2024-02-07 ENCOUNTER — APPOINTMENT (EMERGENCY)
Dept: RADIOLOGY | Facility: HOSPITAL | Age: 64
DRG: 177 | End: 2024-02-07
Payer: COMMERCIAL

## 2024-02-07 DIAGNOSIS — E87.6 HYPOKALEMIA: ICD-10-CM

## 2024-02-07 DIAGNOSIS — J06.9 UPPER RESPIRATORY TRACT INFECTION DUE TO COVID-19 VIRUS: Primary | ICD-10-CM

## 2024-02-07 DIAGNOSIS — R41.82 AMS (ALTERED MENTAL STATUS): ICD-10-CM

## 2024-02-07 DIAGNOSIS — R26.2 AMBULATORY DYSFUNCTION: ICD-10-CM

## 2024-02-07 DIAGNOSIS — G40.109 FOCAL EPILEPSY (HCC): ICD-10-CM

## 2024-02-07 DIAGNOSIS — K59.00 CONSTIPATION: ICD-10-CM

## 2024-02-07 DIAGNOSIS — K21.9 GERD (GASTROESOPHAGEAL REFLUX DISEASE): ICD-10-CM

## 2024-02-07 DIAGNOSIS — R53.1 GENERALIZED WEAKNESS: ICD-10-CM

## 2024-02-07 DIAGNOSIS — U07.1 UPPER RESPIRATORY TRACT INFECTION DUE TO COVID-19 VIRUS: Primary | ICD-10-CM

## 2024-02-07 LAB
ALBUMIN SERPL BCP-MCNC: 4.3 G/DL (ref 3.5–5)
ALP SERPL-CCNC: 75 U/L (ref 34–104)
ALT SERPL W P-5'-P-CCNC: 17 U/L (ref 7–52)
AMPHETAMINES SERPL QL SCN: NEGATIVE
ANION GAP SERPL CALCULATED.3IONS-SCNC: 9 MMOL/L
APAP SERPL-MCNC: 11 UG/ML (ref 10–20)
AST SERPL W P-5'-P-CCNC: 15 U/L (ref 13–39)
BARBITURATES UR QL: NEGATIVE
BASOPHILS # BLD AUTO: 0.06 THOUSANDS/ÂΜL (ref 0–0.1)
BASOPHILS NFR BLD AUTO: 1 % (ref 0–1)
BENZODIAZ UR QL: NEGATIVE
BILIRUB SERPL-MCNC: 0.54 MG/DL (ref 0.2–1)
BUN SERPL-MCNC: 21 MG/DL (ref 5–25)
CALCIUM SERPL-MCNC: 9.7 MG/DL (ref 8.4–10.2)
CHLORIDE SERPL-SCNC: 100 MMOL/L (ref 96–108)
CO2 SERPL-SCNC: 26 MMOL/L (ref 21–32)
COCAINE UR QL: NEGATIVE
CREAT SERPL-MCNC: 0.99 MG/DL (ref 0.6–1.3)
EOSINOPHIL # BLD AUTO: 0.05 THOUSAND/ÂΜL (ref 0–0.61)
EOSINOPHIL NFR BLD AUTO: 1 % (ref 0–6)
ERYTHROCYTE [DISTWIDTH] IN BLOOD BY AUTOMATED COUNT: 13.3 % (ref 11.6–15.1)
ETHANOL SERPL-MCNC: <10 MG/DL
FLUAV RNA RESP QL NAA+PROBE: NEGATIVE
FLUBV RNA RESP QL NAA+PROBE: NEGATIVE
GFR SERPL CREATININE-BSD FRML MDRD: 79 ML/MIN/1.73SQ M
GLUCOSE SERPL-MCNC: 128 MG/DL (ref 65–140)
HCT VFR BLD AUTO: 40.5 % (ref 36.5–49.3)
HGB BLD-MCNC: 13.4 G/DL (ref 12–17)
IMM GRANULOCYTES # BLD AUTO: 0.04 THOUSAND/UL (ref 0–0.2)
IMM GRANULOCYTES NFR BLD AUTO: 0 % (ref 0–2)
LYMPHOCYTES # BLD AUTO: 0.88 THOUSANDS/ÂΜL (ref 0.6–4.47)
LYMPHOCYTES NFR BLD AUTO: 8 % (ref 14–44)
MCH RBC QN AUTO: 30.7 PG (ref 26.8–34.3)
MCHC RBC AUTO-ENTMCNC: 33.1 G/DL (ref 31.4–37.4)
MCV RBC AUTO: 93 FL (ref 82–98)
METHADONE UR QL: NEGATIVE
MONOCYTES # BLD AUTO: 1.02 THOUSAND/ÂΜL (ref 0.17–1.22)
MONOCYTES NFR BLD AUTO: 9 % (ref 4–12)
NEUTROPHILS # BLD AUTO: 8.8 THOUSANDS/ÂΜL (ref 1.85–7.62)
NEUTS SEG NFR BLD AUTO: 81 % (ref 43–75)
NRBC BLD AUTO-RTO: 0 /100 WBCS
OPIATES UR QL SCN: NEGATIVE
OXYCODONE+OXYMORPHONE UR QL SCN: NEGATIVE
PCP UR QL: NEGATIVE
PLATELET # BLD AUTO: 219 THOUSANDS/UL (ref 149–390)
PMV BLD AUTO: 9.8 FL (ref 8.9–12.7)
POTASSIUM SERPL-SCNC: 3.2 MMOL/L (ref 3.5–5.3)
PROT SERPL-MCNC: 7.2 G/DL (ref 6.4–8.4)
RBC # BLD AUTO: 4.36 MILLION/UL (ref 3.88–5.62)
RSV RNA RESP QL NAA+PROBE: NEGATIVE
SALICYLATES SERPL-MCNC: <5 MG/DL (ref 3–20)
SARS-COV-2 RNA RESP QL NAA+PROBE: POSITIVE
SODIUM SERPL-SCNC: 135 MMOL/L (ref 135–147)
THC UR QL: NEGATIVE
TSH SERPL DL<=0.05 MIU/L-ACNC: 0.89 UIU/ML (ref 0.45–4.5)
WBC # BLD AUTO: 10.85 THOUSAND/UL (ref 4.31–10.16)

## 2024-02-07 PROCEDURE — 82077 ASSAY SPEC XCP UR&BREATH IA: CPT | Performed by: EMERGENCY MEDICINE

## 2024-02-07 PROCEDURE — 84443 ASSAY THYROID STIM HORMONE: CPT | Performed by: EMERGENCY MEDICINE

## 2024-02-07 PROCEDURE — G1004 CDSM NDSC: HCPCS

## 2024-02-07 PROCEDURE — 80179 DRUG ASSAY SALICYLATE: CPT | Performed by: EMERGENCY MEDICINE

## 2024-02-07 PROCEDURE — 80053 COMPREHEN METABOLIC PANEL: CPT | Performed by: EMERGENCY MEDICINE

## 2024-02-07 PROCEDURE — 82746 ASSAY OF FOLIC ACID SERUM: CPT

## 2024-02-07 PROCEDURE — 70496 CT ANGIOGRAPHY HEAD: CPT

## 2024-02-07 PROCEDURE — 80307 DRUG TEST PRSMV CHEM ANLYZR: CPT | Performed by: EMERGENCY MEDICINE

## 2024-02-07 PROCEDURE — 85025 COMPLETE CBC W/AUTO DIFF WBC: CPT | Performed by: EMERGENCY MEDICINE

## 2024-02-07 PROCEDURE — 70498 CT ANGIOGRAPHY NECK: CPT

## 2024-02-07 PROCEDURE — 81001 URINALYSIS AUTO W/SCOPE: CPT | Performed by: EMERGENCY MEDICINE

## 2024-02-07 PROCEDURE — 99285 EMERGENCY DEPT VISIT HI MDM: CPT | Performed by: EMERGENCY MEDICINE

## 2024-02-07 PROCEDURE — 71045 X-RAY EXAM CHEST 1 VIEW: CPT

## 2024-02-07 PROCEDURE — 93005 ELECTROCARDIOGRAM TRACING: CPT

## 2024-02-07 PROCEDURE — 99285 EMERGENCY DEPT VISIT HI MDM: CPT

## 2024-02-07 PROCEDURE — 0241U HB NFCT DS VIR RESP RNA 4 TRGT: CPT | Performed by: EMERGENCY MEDICINE

## 2024-02-07 PROCEDURE — 36415 COLL VENOUS BLD VENIPUNCTURE: CPT

## 2024-02-07 PROCEDURE — 80143 DRUG ASSAY ACETAMINOPHEN: CPT | Performed by: EMERGENCY MEDICINE

## 2024-02-07 RX ORDER — POTASSIUM CHLORIDE 20 MEQ/1
40 TABLET, EXTENDED RELEASE ORAL ONCE
Status: COMPLETED | OUTPATIENT
Start: 2024-02-07 | End: 2024-02-07

## 2024-02-07 RX ADMIN — IOHEXOL 85 ML: 350 INJECTION, SOLUTION INTRAVENOUS at 21:13

## 2024-02-07 RX ADMIN — POTASSIUM CHLORIDE 40 MEQ: 1500 TABLET, EXTENDED RELEASE ORAL at 20:08

## 2024-02-07 NOTE — Clinical Note
Case was discussed with [facility], and the patient's admission status was agreed to be Admission Status: observation status to the service of Dr. Lemus .

## 2024-02-07 NOTE — ED PROVIDER NOTES
History  Chief Complaint   Patient presents with    Altered Mental Status     Per EMS, more altered than baseline. Typically GCS 14. Flu like symptoms started this afternoon      64 year old male sent for evaluation of altered mental status.  Patient woke this morning with a mild cough.  She had called his PCP who prescribed him a cough medicine, but he has not yet started it.  At 3 pm, his wife checked on him and found him more tired than usual with generalized weakness and fever of 101F.  At 3:30 pm she gave him 2 tylenol tablets.  No recent injuries.  Normally ambulates with a walker, but unable to walk this afternoon due to generalized weakness.  History of staring episodes which were suspected to be secondary to seizures with no episodes since starting Keppra in July.  He was scheduled for CTA head/neck for tomorrow due to his history of prior CVA s/p ICA stent.      Altered Mental Status      Prior to Admission Medications   Prescriptions Last Dose Informant Patient Reported? Taking?   MELATONIN GUMMIES PO  Spouse/Significant Other Yes No   Sig: Take 5 mg by mouth daily at bedtime   acetaminophen (TYLENOL) 325 mg tablet  Spouse/Significant Other No No   Sig: Take 3 tablets (975 mg total) by mouth 3 (three) times a day as needed for mild pain, headaches or fever   aspirin (ECOTRIN LOW STRENGTH) 81 mg EC tablet  Spouse/Significant Other No No   Sig: Take 1 tablet (81 mg total) by mouth daily Do not start before April 19, 2023.   atorvastatin (LIPITOR) 40 mg tablet  Spouse/Significant Other Yes No   Sig: Take 40 mg by mouth daily with breakfast   baclofen 5 MG TABS  Spouse/Significant Other No No   Sig: Take 5 mg by mouth 2 (two) times a day as needed for muscle spasms   donepezil (ARICEPT) 10 mg tablet  Spouse/Significant Other No No   Sig: Take 1 tablet (10 mg total) by mouth in the morning   hydrochlorothiazide (HYDRODIURIL) 12.5 mg tablet  Spouse/Significant Other Yes No   levETIRAcetam (Keppra) 500 mg tablet   Spouse/Significant Other No No   Sig: Take 1 tab (500 mg) twice a day for 1 week, then take 2 tabs (1000 mg) twice a day.   Patient taking differently: Take 500 mg by mouth 2 (two) times a day On recent neuro note, pt is 500mg bid   lidocaine (Lidoderm) 5 %  Spouse/Significant Other No No   Sig: Apply 1 patch topically over 12 hours daily Remove & Discard patch within 12 hours or as directed by MD   losartan (COZAAR) 50 mg tablet  Spouse/Significant Other Yes No   Sig: Take 50 mg by mouth daily   metoprolol succinate (TOPROL-XL) 100 mg 24 hr tablet  Spouse/Significant Other Yes No   Sig: Take 100 mg by mouth daily   pantoprazole (PROTONIX) 20 mg tablet   Yes Yes   Sig: Take 20 mg by mouth daily   tamsulosin (FLOMAX) 0.4 mg  Spouse/Significant Other No No   Sig: Take 1 capsule (0.4 mg total) by mouth daily with dinner   ticagrelor (BRILINTA) 90 MG  Spouse/Significant Other No No   Sig: Take 1 tablet (90 mg total) by mouth every 12 (twelve) hours   traZODone (DESYREL) 100 mg tablet  Spouse/Significant Other Yes No   Sig: Take 100 mg by mouth daily at bedtime      Facility-Administered Medications: None       Past Medical History:   Diagnosis Date    Depression     Diabetes mellitus (HCC)     patient denies    Dyslipidemia 03/26/2019    GERD (gastroesophageal reflux disease)     Hyperlipidemia     Hypertension     Prediabetes     Prediabetes 04/03/2019    Stroke (HCC)        Past Surgical History:   Procedure Laterality Date    ARTHROSCOPIC REPAIR ACL Left     BACK SURGERY      twice    CARPAL TUNNEL RELEASE Right     IR CEREBRAL ANGIOGRAPHY  05/04/2023    IR STROKE ALERT  03/19/2019    SHOULDER SURGERY Left     US GUIDED THYROID BIOPSY  11/21/2023       Family History   Problem Relation Age of Onset    Hyperlipidemia Mother     Hypertension Mother     Diabetes unspecified Mother         prediabetes    Heart attack Mother     Diabetes Mother     Hyperlipidemia Father     Hypertension Father     Prostate cancer Father      Stroke Father     Hyperlipidemia Sister     Hypertension Sister     Hyperlipidemia Sister     Hypertension Sister     Depression Sister     Hypertension Sister     Hyperlipidemia Sister     Depression Sister     Hyperlipidemia Brother     Hypertension Brother     Hypertension Brother     Prostate cancer Brother     Hypothyroidism Daughter     Hypothyroidism Son     Diabetes unspecified Son     Seizures Neg Hx      I have reviewed and agree with the history as documented.    E-Cigarette/Vaping    E-Cigarette Use Never User      E-Cigarette/Vaping Substances    Nicotine No     THC No     CBD No     Flavoring No     Other No     Unknown No      Social History     Tobacco Use    Smoking status: Former    Smokeless tobacco: Never   Vaping Use    Vaping status: Never Used   Substance Use Topics    Alcohol use: Never    Drug use: Never       Review of Systems    Physical Exam  Physical Exam  Vitals and nursing note reviewed.   HENT:      Head: Normocephalic and atraumatic.   Eyes:      Conjunctiva/sclera: Conjunctivae normal.      Pupils: Pupils are equal, round, and reactive to light.   Cardiovascular:      Rate and Rhythm: Normal rate and regular rhythm.      Pulses: Normal pulses.      Heart sounds: Normal heart sounds.   Pulmonary:      Effort: Pulmonary effort is normal. No respiratory distress.      Breath sounds: Normal breath sounds.   Abdominal:      General: There is no distension.      Palpations: Abdomen is soft.      Tenderness: There is no abdominal tenderness.   Skin:     General: Skin is warm and dry.   Neurological:      Mental Status: He is alert. He is confused.         Vital Signs  ED Triage Vitals   Temperature Pulse Respirations Blood Pressure SpO2   02/07/24 1821 02/07/24 1821 02/07/24 1821 02/07/24 1821 02/07/24 1821   99.7 °F (37.6 °C) 88 20 (!) 148/110 96 %      Temp Source Heart Rate Source Patient Position - Orthostatic VS BP Location FiO2 (%)   02/07/24 1821 02/07/24 1900 02/07/24 1821  02/07/24 1821 --   Oral Monitor Sitting Left arm       Pain Score       02/07/24 1821       No Pain           Vitals:    02/08/24 0700 02/08/24 0800 02/08/24 0900 02/08/24 1000   BP: (!) 179/79 156/67 159/73 152/70   Pulse: 97 93 90 85   Patient Position - Orthostatic VS:  Lying  Lying         Visual Acuity  Visual Acuity      Flowsheet Row Most Recent Value   L Pupil Size (mm) 2   R Pupil Size (mm) 2   L Pupil Shape Round   R Pupil Shape Round            ED Medications  Medications   aspirin (ECOTRIN LOW STRENGTH) EC tablet 81 mg (81 mg Oral Given 2/8/24 0817)   atorvastatin (LIPITOR) tablet 40 mg (40 mg Oral Given 2/8/24 0705)   baclofen tablet 5 mg (has no administration in time range)   donepezil (ARICEPT) tablet 10 mg (10 mg Oral Given 2/8/24 0817)   levETIRAcetam (KEPPRA) tablet 500 mg (500 mg Oral Given 2/8/24 0817)   losartan (COZAAR) tablet 50 mg (50 mg Oral Given 2/8/24 0817)   metoprolol succinate (TOPROL-XL) 24 hr tablet 100 mg (100 mg Oral Given 2/8/24 0817)   pantoprazole (PROTONIX) EC tablet 20 mg (20 mg Oral Given 2/8/24 0614)   tamsulosin (FLOMAX) capsule 0.4 mg (has no administration in time range)   ticagrelor (BRILINTA) tablet 90 mg (90 mg Oral Given 2/8/24 0818)   multi-electrolyte (PLASMALYTE-A/ISOLYTE-S PH 7.4) IV solution (100 mL/hr Intravenous New Bag 2/8/24 0358)   enoxaparin (LOVENOX) subcutaneous injection 40 mg (40 mg Subcutaneous Given 2/8/24 0818)   melatonin tablet 5 mg (has no administration in time range)   remdesivir (Veklury) 200 mg in sodium chloride 0.9 % 290 mL IVPB (0 mg Intravenous Stopped 2/8/24 6235)     Followed by   remdesivir (Veklury) 100 mg in sodium chloride 0.9 % 270 mL IVPB (has no administration in time range)   traZODone (DESYREL) tablet 100 mg (has no administration in time range)   potassium chloride (Klor-Con M20) CR tablet 40 mEq (40 mEq Oral Given 2/7/24 2008)   iohexol (OMNIPAQUE) 350 MG/ML injection (MULTI-DOSE) 100 mL (85 mL Intravenous Given 2/7/24 2113)  "      Diagnostic Studies  Results Reviewed       Procedure Component Value Units Date/Time    Folate [[id number]]  (Normal) Collected: 02/07/24 1854    Lab Status: Final result Specimen: Blood from Arm, Right Updated: 02/08/24 1331     Folate 6.4 ng/mL     Acetaminophen level-\"If concentration is detectable, please discuss with medical  on call.\" [[id number]]  (Abnormal) Collected: 02/08/24 0708    Lab Status: Final result Specimen: Blood from Arm, Right Updated: 02/08/24 0906     Acetaminophen Level <2 ug/mL     Basic metabolic panel [[id number]]  (Abnormal) Collected: 02/08/24 0708    Lab Status: Final result Specimen: Blood from Arm, Right Updated: 02/08/24 0744     Sodium 136 mmol/L      Potassium 3.5 mmol/L      Chloride 101 mmol/L      CO2 28 mmol/L      ANION GAP 7 mmol/L      BUN 16 mg/dL      Creatinine 0.91 mg/dL      Glucose 100 mg/dL      Glucose, Fasting 100 mg/dL      Calcium 9.3 mg/dL      eGFR 88 ml/min/1.73sq m     Narrative:      National Kidney Disease Foundation guidelines for Chronic Kidney Disease (CKD):     Stage 1 with normal or high GFR (GFR > 90 mL/min/1.73 square meters)    Stage 2 Mild CKD (GFR = 60-89 mL/min/1.73 square meters)    Stage 3A Moderate CKD (GFR = 45-59 mL/min/1.73 square meters)    Stage 3B Moderate CKD (GFR = 30-44 mL/min/1.73 square meters)    Stage 4 Severe CKD (GFR = 15-29 mL/min/1.73 square meters)    Stage 5 End Stage CKD (GFR <15 mL/min/1.73 square meters)  Note: GFR calculation is accurate only with a steady state creatinine    Procalcitonin [[id number]]  (Normal) Collected: 02/08/24 0708    Lab Status: Final result Specimen: Blood from Arm, Right Updated: 02/08/24 0742     Procalcitonin 0.06 ng/ml     CBC [[id number]]  (Abnormal) Collected: 02/08/24 0615    Lab Status: Final result Specimen: Blood from Arm, Right Updated: 02/08/24 0626     WBC 10.36 Thousand/uL      RBC 4.27 Million/uL      Hemoglobin 13.4 g/dL      Hematocrit 39.7 %      MCV 93 fL      MCH " 31.4 pg      MCHC 33.8 g/dL      RDW 13.5 %      Platelets 200 Thousands/uL      MPV 9.9 fL     Urine Microscopic [[id number]]  (Normal) Collected: 02/07/24 2012    Lab Status: Final result Specimen: Urine, Other Updated: 02/08/24 0038     RBC, UA 0-1 /hpf      WBC, UA 0-1 /hpf      Epithelial Cells Occasional /hpf      Bacteria, UA None Seen /hpf     UA (URINE) with reflex to Scope [[id number]]  (Abnormal) Collected: 02/07/24 2012    Lab Status: Final result Specimen: Urine, Other Updated: 02/08/24 0031     Color, UA Yellow     Clarity, UA Clear     Specific Gravity, UA 1.020     pH, UA 6.0     Leukocytes, UA Negative     Nitrite, UA Negative     Protein, UA 30 (1+) mg/dl      Glucose, UA 70 (7/100%) mg/dl      Ketones, UA Negative mg/dl      Urobilinogen, UA <2.0 mg/dl      Bilirubin, UA Negative     Occult Blood, UA Negative    Rapid drug screen, urine [[id number]]  (Normal) Collected: 02/07/24 2012    Lab Status: Final result Specimen: Urine, Other Updated: 02/07/24 2030     Amph/Meth UR Negative     Barbiturate Ur Negative     Benzodiazepine Urine Negative     Cocaine Urine Negative     Methadone Urine Negative     Opiate Urine Negative     PCP Ur Negative     THC Urine Negative     Oxycodone Urine Negative    Narrative:      FOR MEDICAL PURPOSES ONLY.   IF CONFIRMATION NEEDED PLEASE CONTACT THE LAB WITHIN 5 DAYS.    Drug Screen Cutoff Levels:  AMPHETAMINE/METHAMPHETAMINES  1000 ng/mL  BARBITURATES     200 ng/mL  BENZODIAZEPINES     200 ng/mL  COCAINE      300 ng/mL  METHADONE      300 ng/mL  OPIATES      300 ng/mL  PHENCYCLIDINE     25 ng/mL  THC       50 ng/mL  OXYCODONE      100 ng/mL    TSH, 3rd generation with Free T4 reflex [[id number]]  (Normal) Collected: 02/07/24 1854    Lab Status: Final result Specimen: Blood from Arm, Right Updated: 02/07/24 1932     TSH 3RD GENERATON 0.890 uIU/mL     Salicylate level [[id number]]  (Normal) Collected: 02/07/24 1854    Lab Status: Final result Specimen: Blood from Arm,  Right Updated: 02/07/24 1920     Salicylate Lvl <5 mg/dL     Narrative:      verified by repeat analysis.Unable to calculate concentration. Result is lower than the dynamic range.    Acetaminophen level-If concentration is detectable, please discuss with medical  on call. [[id number]]  (Normal) Collected: 02/07/24 1854    Lab Status: Final result Specimen: Blood from Arm, Right Updated: 02/07/24 1920     Acetaminophen Level 11 ug/mL     Narrative:      verified by repeat analysis.Unable to calculate concentration. Result is lower than the dynamic range.    Ethanol [[id number]]  (Normal) Collected: 02/07/24 1854    Lab Status: Final result Specimen: Blood from Arm, Right Updated: 02/07/24 1916     Ethanol Lvl <10 mg/dL     FLU/RSV/COVID - if FLU/RSV clinically relevant [[id number]]  (Abnormal) Collected: 02/07/24 1824    Lab Status: Final result Specimen: Nares from Nose Updated: 02/07/24 1912     SARS-CoV-2 Positive     INFLUENZA A PCR Negative     INFLUENZA B PCR Negative     RSV PCR Negative    Narrative:      FOR PEDIATRIC PATIENTS - copy/paste COVID Guidelines URL to browser: [email or web address]    SARS-CoV-2 assay is a Nucleic Acid Amplification assay intended for the  qualitative detection of nucleic acid from SARS-CoV-2 in nasopharyngeal  swabs. Results are for the presumptive identification of SARS-CoV-2 RNA.    Positive results are indicative of infection with SARS-CoV-2, the virus  causing COVID-19, but do not rule out bacterial infection or co-infection  with other viruses. Laboratories within the [place] and its  territories are required to report all positive results to the appropriate  public health authorities. Negative results do not preclude SARS-CoV-2  infection and should not be used as the sole basis for treatment or other  patient management decisions. Negative results must be combined with  clinical observations, patient  history, and epidemiological information.  This test has not been FDA cleared or approved.    This test has been authorized by FDA under an Emergency Use Authorization  (EUA). This test is only authorized for the duration of time the  declaration that circumstances exist justifying the authorization of the  emergency use of an in vitro diagnostic tests for detection of SARS-CoV-2  virus and/or diagnosis of COVID-19 infection under section 564(b)(1) of  the Act, 21 U.S.C. 360bbb-3(b)(1), unless the authorization is terminated  or revoked sooner. The test has been validated but independent review by FDA  and CLIA is pending.    Test performed using [employer] GeneXpert: This RT-PCR assay targets N2,  a region unique to SARS-CoV-2. A conserved region in the E-gene was chosen  for pan-Sarbecovirus detection which includes SARS-CoV-2.    According to CMS-2020-01-R, this platform meets the definition of high-throughput technology.    Comprehensive metabolic panel [[id number]]  (Abnormal) Collected: 02/07/24 1824    Lab Status: Final result Specimen: Blood from Arm, Right Updated: 02/07/24 1903     Sodium 135 mmol/L      Potassium 3.2 mmol/L      Chloride 100 mmol/L      CO2 26 mmol/L      ANION GAP 9 mmol/L      BUN 21 mg/dL      Creatinine 0.99 mg/dL      Glucose 128 mg/dL      Calcium 9.7 mg/dL      AST 15 U/L      ALT 17 U/L      Alkaline Phosphatase 75 U/L      Total Protein 7.2 g/dL      Albumin 4.3 g/dL      Total Bilirubin 0.54 mg/dL      eGFR 79 ml/min/1.73sq m     Narrative:      National Kidney Disease Foundation guidelines for Chronic Kidney Disease (CKD):     Stage 1 with normal or high GFR (GFR > 90 mL/min/1.73 square meters)    Stage 2 Mild CKD (GFR = 60-89 mL/min/1.73 square meters)    Stage 3A Moderate CKD (GFR = 45-59 mL/min/1.73 square meters)    Stage 3B Moderate CKD (GFR = 30-44 mL/min/1.73 square meters)    Stage 4 Severe CKD (GFR = 15-29 mL/min/1.73 square meters)    Stage 5 End Stage CKD (GFR <15  mL/min/1.73 square meters)  Note: GFR calculation is accurate only with a steady state creatinine    CBC and differential [[id number]]  (Abnormal) Collected: 02/07/24 1824    Lab Status: Final result Specimen: Blood from Arm, Right Updated: 02/07/24 1835     WBC 10.85 Thousand/uL      RBC 4.36 Million/uL      Hemoglobin 13.4 g/dL      Hematocrit 40.5 %      MCV 93 fL      MCH 30.7 pg      MCHC 33.1 g/dL      RDW 13.3 %      MPV 9.8 fL      Platelets 219 Thousands/uL      nRBC 0 /100 WBCs      Neutrophils Relative 81 %      Immat GRANS % 0 %      Lymphocytes Relative 8 %      Monocytes Relative 9 %      Eosinophils Relative 1 %      Basophils Relative 1 %      Neutrophils Absolute 8.80 Thousands/µL      Immature Grans Absolute 0.04 Thousand/uL      Lymphocytes Absolute 0.88 Thousands/µL      Monocytes Absolute 1.02 Thousand/µL      Eosinophils Absolute 0.05 Thousand/µL      Basophils Absolute 0.06 Thousands/µL                    CTA head and neck with and without contrast   Final Result by Denver Chinchilla MD (02/07 2304)      1.  No acute intracranial hemorrhage. Stable senescent changes and sequelae of multifocal old infarcts in the brain parenchyma detailed above. Paranasal sinus disease.   2.  Mild calcific atherosclerosis of the aortic arch region.  Moderate (50%) luminal narrowing of the proximal right subclavian artery due to partially calcified mixed plaque.   3.  Right vertebral artery dominance.  Moderate (50 to 70%) luminal stenosis involving the distal left vertebral artery V4 segment due to calcific atherosclerosis. Findings appear similar compared to the prior exam.   4.  Normal course and caliber of the right proximal to mid common carotid artery. Moderate (50%) luminal narrowing of the distal right common carotid artery and proximal right internal carotid artery due to irregular mixed plaque. Remainder of the right    cervical internal carotid artery appear patent with normal course and caliber.   5.   The proximal to mid left common carotid artery is widely patent with normal course and caliber. Metallic stent in the distal left common carotid artery and proximal left internal carotid artery is again seen with moderate to severe circumferential    narrowing of the proximal internal carotid artery, similar compared to the prior study. This is likely related to intimal hyperplasia. Remainder of the left cervical internal carotid artery appear grossly patent with normal course and caliber. No    occlusion or dissection.   6.  Short segment of complete occlusion involving the distal right M1 segment is again seen, similar compared to the prior study with reconstitution of more distal branches via collateral vessels. Stable appearance of slightly diminished caliber of the    distal right MCA branch vessels although they remain patent. Left middle cerebral artery and proximal branches appear grossly patent with normal course and caliber.   7.  No enhancing brain mass/fluid collection or evidence of vascular malformation. No enhancing soft tissue neck mass/fluid collection or cervical lymphadenopathy.   8.  Stable nonspecific subcentimeter hypodense thyroid nodules requiring no imaging follow-up per current guidelines.      Workstation performed: [id number]         XR chest 1 view portable   ED Interpretation by Myriam Leger MD (02/07 1934)   No acute pulmonary pathology      Final Result by Jarrell Leon MD (02/08 0837)      No acute cardiopulmonary disease.            Workstation performed: [id number]                    Procedures  ECG 12 Lead Documentation Only    Date/Time: 2/7/2024 6:42 PM    Performed by: Myriam Leger MD  Authorized by: Myriam Leger MD    Indications / Diagnosis:  Altered mental status  Patient location:  ED  Previous ECG:     Previous ECG:  Compared to current    Comparison ECG info:  10/3/23 sinus tachycardia with nonspecific twave  inversions  Interpretation:     Interpretation: abnormal    Rate:     ECG rate:  88    ECG rate assessment: normal    Rhythm:     Rhythm: sinus rhythm    Ectopy:     Ectopy: PAC    QRS:     QRS axis:  Normal  Conduction:     Conduction: normal    ST segments:     ST segments:  Normal  T waves:     T waves: inverted      Inverted:  V5           ED Course  ED Course as of 02/08/24 1603   Wed Feb 07, 2024 1934 SARS-COV-2(!): Positive   1934 Potassium(!): 3.2                               SBIRT 20yo+      Flowsheet Row Most Recent Value   Initial Alcohol Screen: US AUDIT-C     1. How often do you have a drink containing alcohol? 0 Filed at: 02/07/2024 1846   2. How many drinks containing alcohol do you have on a typical day you are drinking?  0 Filed at: 02/07/2024 1846   3a. Male UNDER 65: How often do you have five or more drinks on one occasion? 0 Filed at: 02/07/2024 1846   3b. FEMALE Any Age, or MALE 65+: How often do you have 4 or more drinks on one occassion? 0 Filed at: 02/07/2024 1846   Audit-C Score 0 Filed at: 02/07/2024 1846   WILLIAM: How many times in the past year have you...    Used an illegal drug or used a prescription medication for non-medical reasons? Never Filed at: 02/07/2024 1846                      Medical Decision Making  64 year old male presents for evaluation of generalized weakness in the setting of URI with fever beginning today.  EKG without acute ischemic changes or arrhythmia on my independent interpretation.  Only current sirs criteria is tachycardia.  Mild hypokalemia for which 40 mEq Kdur was ordered.  COVID positive.  CTA head/neck ordered given history of prior stroke with ICA stenting.  Patient admitted for further evaluation and management.        Amount and/or Complexity of Data Reviewed  Labs: ordered. Decision-making details documented in ED Course.  Radiology: ordered and independent interpretation performed.    Risk  Prescription drug management.  Decision regarding  hospitalization.             Disposition  Final diagnoses:   Upper respiratory tract infection due to COVID-19 virus   Hypokalemia   Generalized weakness   Ambulatory dysfunction     Time reflects when diagnosis was documented in both MDM as applicable and the Disposition within this note       Time User Action Codes Description Comment    2/7/2024  7:41 PM Myriam Leger Add [U07.1,  J06.9] Upper respiratory tract infection due to COVID-19 virus     2/7/2024  7:41 PM AfricaMyriam hanna J Add [E87.6] Hypokalemia     2/7/2024  9:51 PM AfricaMyriam hanna J Add [R53.1] Generalized weakness     2/8/2024 12:03 AM Lyudmila Moser Add [R26.2] Ambulatory dysfunction     2/8/2024  9:39 AM AmbronYulianaa L Add [G40.109] Focal epilepsy (HCC)     2/8/2024  9:39 AM Ambron, Latrice L Add [R41.82] AMS (altered mental status)           ED Disposition       ED Disposition   Admit    Condition   Stable    Date/Time   u Feb 8, 2024 0004    Comment   Case was discussed with [facility], and the patient's admission status was agreed to be Admission Status: observation status to the service of Dr. Medina .               Follow-up Information       Follow up With Specialties Details Why Contact Info Additional Information    ERIC Calixto Nurse Practitioner Schedule an appointment as soon as possible for a visit in 2 days please schedule a virtual visit for follow up [address] [place] [id number]  [phone]        [facility] Emergency Department Emergency Medicine Go to  If symptoms worsen [address] [facility] [address] [phone] [facility] Emergency Department, [address] [place], [place] [address]            Current Discharge Medication List        CONTINUE these medications which have NOT CHANGED    Details   pantoprazole (PROTONIX) 20 mg tablet Take 20 mg by mouth daily      acetaminophen (TYLENOL) 325 mg tablet Take 3 tablets (578  mg total) by mouth 3 (three) times a day as needed for mild pain, headaches or fever  Refills: 0    Associated Diagnoses: Pain      aspirin (ECOTRIN LOW STRENGTH) 81 mg EC tablet Take 1 tablet (81 mg total) by mouth daily Do not start before April 19, 2023.  Qty: 30 tablet, Refills: 0    Associated Diagnoses: Stroke (HCC); Hypertensive urgency; Altered mental status, unspecified altered mental status type      atorvastatin (LIPITOR) 40 mg tablet Take 40 mg by mouth daily with breakfast      baclofen 5 MG TABS Take 5 mg by mouth 2 (two) times a day as needed for muscle spasms  Refills: 0    Associated Diagnoses: Muscle spasms of both lower extremities      donepezil (ARICEPT) 10 mg tablet Take 1 tablet (10 mg total) by mouth in the morning  Qty: 60 tablet, Refills: 2    Associated Diagnoses: Memory loss      hydrochlorothiazide (HYDRODIURIL) 12.5 mg tablet       levETIRAcetam (Keppra) 500 mg tablet Take 1 tab (500 mg) twice a day for 1 week, then take 2 tabs (1000 mg) twice a day.  Qty: 360 tablet, Refills: 3    Associated Diagnoses: Focal epilepsy (HCC)      lidocaine (Lidoderm) 5 % Apply 1 patch topically over 12 hours daily Remove & Discard patch within 12 hours or as directed by MD  Qty: 30 patch, Refills: 2    Associated Diagnoses: Acute midline low back pain without sciatica      losartan (COZAAR) 50 mg tablet Take 50 mg by mouth daily      MELATONIN GUMMIES PO Take 5 mg by mouth daily at bedtime      metoprolol succinate (TOPROL-XL) 100 mg 24 hr tablet Take 100 mg by mouth daily      tamsulosin (FLOMAX) 0.4 mg Take 1 capsule (0.4 mg total) by mouth daily with dinner  Qty: 90 capsule, Refills: 3    Associated Diagnoses: Urinary retention      ticagrelor (BRILINTA) 90 MG Take 1 tablet (90 mg total) by mouth every 12 (twelve) hours  Qty: 60 tablet, Refills: 3    Associated Diagnoses: Carotid stenosis, bilateral      traZODone (DESYREL) 100 mg tablet Take 100 mg by mouth daily at bedtime             No discharge  procedures on file.    PDMP Review         Value Time User    PDMP Reviewed  Yes 10/7/2023  9:00 AM Mariely Kingston PA-C            ED Provider  Electronically Signed by             Myriam Leger MD  02/08/24 2366

## 2024-02-08 PROBLEM — U07.1 COVID: Status: ACTIVE | Noted: 2024-02-08

## 2024-02-08 PROBLEM — E87.6 DRUG-INDUCED HYPOKALEMIA: Status: RESOLVED | Noted: 2024-02-08 | Resolved: 2024-02-08

## 2024-02-08 PROBLEM — R41.82 AMS (ALTERED MENTAL STATUS): Status: ACTIVE | Noted: 2024-02-08

## 2024-02-08 PROBLEM — T50.905A DRUG-INDUCED HYPOKALEMIA: Status: RESOLVED | Noted: 2024-02-08 | Resolved: 2024-02-08

## 2024-02-08 PROBLEM — E87.6 DRUG-INDUCED HYPOKALEMIA: Status: ACTIVE | Noted: 2024-02-08

## 2024-02-08 PROBLEM — Z87.898 HISTORY OF PREDIABETES: Status: ACTIVE | Noted: 2019-04-03

## 2024-02-08 PROBLEM — T50.905A DRUG-INDUCED HYPOKALEMIA: Status: ACTIVE | Noted: 2024-02-08

## 2024-02-08 PROBLEM — R53.1 GENERALIZED WEAKNESS: Status: ACTIVE | Noted: 2024-02-08

## 2024-02-08 LAB
ANION GAP SERPL CALCULATED.3IONS-SCNC: 7 MMOL/L
APAP SERPL-MCNC: <2 UG/ML (ref 10–20)
BACTERIA UR QL AUTO: NORMAL /HPF
BILIRUB UR QL STRIP: NEGATIVE
BUN SERPL-MCNC: 16 MG/DL (ref 5–25)
CALCIUM SERPL-MCNC: 9.3 MG/DL (ref 8.4–10.2)
CHLORIDE SERPL-SCNC: 101 MMOL/L (ref 96–108)
CLARITY UR: CLEAR
CO2 SERPL-SCNC: 28 MMOL/L (ref 21–32)
COLOR UR: YELLOW
CREAT SERPL-MCNC: 0.91 MG/DL (ref 0.6–1.3)
ERYTHROCYTE [DISTWIDTH] IN BLOOD BY AUTOMATED COUNT: 13.5 % (ref 11.6–15.1)
FOLATE SERPL-MCNC: 6.4 NG/ML
GFR SERPL CREATININE-BSD FRML MDRD: 88 ML/MIN/1.73SQ M
GLUCOSE P FAST SERPL-MCNC: 100 MG/DL (ref 65–99)
GLUCOSE SERPL-MCNC: 100 MG/DL (ref 65–140)
GLUCOSE UR STRIP-MCNC: ABNORMAL MG/DL
HCT VFR BLD AUTO: 39.7 % (ref 36.5–49.3)
HGB BLD-MCNC: 13.4 G/DL (ref 12–17)
HGB UR QL STRIP.AUTO: NEGATIVE
KETONES UR STRIP-MCNC: NEGATIVE MG/DL
LEUKOCYTE ESTERASE UR QL STRIP: NEGATIVE
MCH RBC QN AUTO: 31.4 PG (ref 26.8–34.3)
MCHC RBC AUTO-ENTMCNC: 33.8 G/DL (ref 31.4–37.4)
MCV RBC AUTO: 93 FL (ref 82–98)
NITRITE UR QL STRIP: NEGATIVE
NON-SQ EPI CELLS URNS QL MICRO: NORMAL /HPF
PH UR STRIP.AUTO: 6 [PH]
PLATELET # BLD AUTO: 200 THOUSANDS/UL (ref 149–390)
PMV BLD AUTO: 9.9 FL (ref 8.9–12.7)
POTASSIUM SERPL-SCNC: 3.5 MMOL/L (ref 3.5–5.3)
PROCALCITONIN SERPL-MCNC: 0.06 NG/ML
PROT UR STRIP-MCNC: ABNORMAL MG/DL
RBC # BLD AUTO: 4.27 MILLION/UL (ref 3.88–5.62)
RBC #/AREA URNS AUTO: NORMAL /HPF
SODIUM SERPL-SCNC: 136 MMOL/L (ref 135–147)
SP GR UR STRIP.AUTO: 1.02 (ref 1–1.03)
UROBILINOGEN UR STRIP-ACNC: <2 MG/DL
WBC # BLD AUTO: 10.36 THOUSAND/UL (ref 4.31–10.16)
WBC #/AREA URNS AUTO: NORMAL /HPF

## 2024-02-08 PROCEDURE — 84145 PROCALCITONIN (PCT): CPT

## 2024-02-08 PROCEDURE — 80143 DRUG ASSAY ACETAMINOPHEN: CPT | Performed by: INTERNAL MEDICINE

## 2024-02-08 PROCEDURE — 97163 PT EVAL HIGH COMPLEX 45 MIN: CPT

## 2024-02-08 PROCEDURE — 99223 1ST HOSP IP/OBS HIGH 75: CPT | Performed by: INTERNAL MEDICINE

## 2024-02-08 PROCEDURE — 80048 BASIC METABOLIC PNL TOTAL CA: CPT

## 2024-02-08 PROCEDURE — 85027 COMPLETE CBC AUTOMATED: CPT

## 2024-02-08 PROCEDURE — XW033E5 INTRODUCTION OF REMDESIVIR ANTI-INFECTIVE INTO PERIPHERAL VEIN, PERCUTANEOUS APPROACH, NEW TECHNOLOGY GROUP 5: ICD-10-PCS | Performed by: INTERNAL MEDICINE

## 2024-02-08 PROCEDURE — 36415 COLL VENOUS BLD VENIPUNCTURE: CPT

## 2024-02-08 PROCEDURE — 97167 OT EVAL HIGH COMPLEX 60 MIN: CPT

## 2024-02-08 RX ORDER — BACLOFEN 10 MG/1
5 TABLET ORAL 2 TIMES DAILY PRN
Status: DISCONTINUED | OUTPATIENT
Start: 2024-02-08 | End: 2024-02-19 | Stop reason: HOSPADM

## 2024-02-08 RX ORDER — TAMSULOSIN HYDROCHLORIDE 0.4 MG/1
0.4 CAPSULE ORAL
Status: DISCONTINUED | OUTPATIENT
Start: 2024-02-08 | End: 2024-02-19 | Stop reason: HOSPADM

## 2024-02-08 RX ORDER — SODIUM CHLORIDE, SODIUM GLUCONATE, SODIUM ACETATE, POTASSIUM CHLORIDE, MAGNESIUM CHLORIDE, SODIUM PHOSPHATE, DIBASIC, AND POTASSIUM PHOSPHATE .53; .5; .37; .037; .03; .012; .00082 G/100ML; G/100ML; G/100ML; G/100ML; G/100ML; G/100ML; G/100ML
100 INJECTION, SOLUTION INTRAVENOUS CONTINUOUS
Status: DISCONTINUED | OUTPATIENT
Start: 2024-02-08 | End: 2024-02-08

## 2024-02-08 RX ORDER — LOSARTAN POTASSIUM 50 MG/1
50 TABLET ORAL DAILY
Status: DISCONTINUED | OUTPATIENT
Start: 2024-02-08 | End: 2024-02-19 | Stop reason: HOSPADM

## 2024-02-08 RX ORDER — DONEPEZIL HYDROCHLORIDE 5 MG/1
10 TABLET, FILM COATED ORAL DAILY
Status: DISCONTINUED | OUTPATIENT
Start: 2024-02-08 | End: 2024-02-19 | Stop reason: HOSPADM

## 2024-02-08 RX ORDER — ENOXAPARIN SODIUM 100 MG/ML
40 INJECTION SUBCUTANEOUS DAILY
Status: DISCONTINUED | OUTPATIENT
Start: 2024-02-08 | End: 2024-02-19 | Stop reason: HOSPADM

## 2024-02-08 RX ORDER — ATORVASTATIN CALCIUM 40 MG/1
40 TABLET, FILM COATED ORAL
Status: DISCONTINUED | OUTPATIENT
Start: 2024-02-08 | End: 2024-02-19 | Stop reason: HOSPADM

## 2024-02-08 RX ORDER — LEVETIRACETAM 500 MG/1
500 TABLET ORAL EVERY 12 HOURS SCHEDULED
Status: DISCONTINUED | OUTPATIENT
Start: 2024-02-08 | End: 2024-02-19 | Stop reason: HOSPADM

## 2024-02-08 RX ORDER — METOPROLOL SUCCINATE 50 MG/1
100 TABLET, EXTENDED RELEASE ORAL DAILY
Status: DISCONTINUED | OUTPATIENT
Start: 2024-02-08 | End: 2024-02-19 | Stop reason: HOSPADM

## 2024-02-08 RX ORDER — PANTOPRAZOLE SODIUM 20 MG/1
20 TABLET, DELAYED RELEASE ORAL
Status: DISCONTINUED | OUTPATIENT
Start: 2024-02-08 | End: 2024-02-19 | Stop reason: HOSPADM

## 2024-02-08 RX ORDER — PANTOPRAZOLE SODIUM 20 MG/1
20 TABLET, DELAYED RELEASE ORAL DAILY
COMMUNITY
Start: 2024-01-15 | End: 2024-02-19

## 2024-02-08 RX ORDER — MELATONIN 1 MG
5 TABLET,CHEWABLE ORAL
Status: DISCONTINUED | OUTPATIENT
Start: 2024-02-08 | End: 2024-02-08

## 2024-02-08 RX ORDER — TRAZODONE HYDROCHLORIDE 50 MG/1
100 TABLET ORAL
Status: DISCONTINUED | OUTPATIENT
Start: 2024-02-08 | End: 2024-02-19 | Stop reason: HOSPADM

## 2024-02-08 RX ADMIN — Medication 5 MG: at 22:02

## 2024-02-08 RX ADMIN — PANTOPRAZOLE SODIUM 20 MG: 20 TABLET, DELAYED RELEASE ORAL at 06:14

## 2024-02-08 RX ADMIN — DONEPEZIL HYDROCHLORIDE 10 MG: 5 TABLET ORAL at 08:17

## 2024-02-08 RX ADMIN — ENOXAPARIN SODIUM 40 MG: 100 INJECTION SUBCUTANEOUS at 08:18

## 2024-02-08 RX ADMIN — ASPIRIN 81 MG: 81 TABLET, COATED ORAL at 08:17

## 2024-02-08 RX ADMIN — REMDESIVIR 200 MG: 100 INJECTION, POWDER, LYOPHILIZED, FOR SOLUTION INTRAVENOUS at 04:15

## 2024-02-08 RX ADMIN — SODIUM CHLORIDE, SODIUM GLUCONATE, SODIUM ACETATE, POTASSIUM CHLORIDE, MAGNESIUM CHLORIDE, SODIUM PHOSPHATE, DIBASIC, AND POTASSIUM PHOSPHATE 100 ML/HR: .53; .5; .37; .037; .03; .012; .00082 INJECTION, SOLUTION INTRAVENOUS at 03:58

## 2024-02-08 RX ADMIN — TAMSULOSIN HYDROCHLORIDE 0.4 MG: 0.4 CAPSULE ORAL at 16:59

## 2024-02-08 RX ADMIN — LOSARTAN POTASSIUM 50 MG: 50 TABLET, FILM COATED ORAL at 08:17

## 2024-02-08 RX ADMIN — TICAGRELOR 90 MG: 90 TABLET ORAL at 22:02

## 2024-02-08 RX ADMIN — LEVETIRACETAM 500 MG: 500 TABLET, FILM COATED ORAL at 08:17

## 2024-02-08 RX ADMIN — LEVETIRACETAM 500 MG: 500 TABLET, FILM COATED ORAL at 22:02

## 2024-02-08 RX ADMIN — TICAGRELOR 90 MG: 90 TABLET ORAL at 08:18

## 2024-02-08 RX ADMIN — TRAZODONE HYDROCHLORIDE 100 MG: 50 TABLET ORAL at 22:02

## 2024-02-08 RX ADMIN — ATORVASTATIN CALCIUM 40 MG: 40 TABLET, FILM COATED ORAL at 07:05

## 2024-02-08 RX ADMIN — METOPROLOL SUCCINATE 100 MG: 50 TABLET, EXTENDED RELEASE ORAL at 08:17

## 2024-02-08 NOTE — PLAN OF CARE
Problem: OCCUPATIONAL THERAPY ADULT  Goal: Performs self-care activities at highest level of function for planned discharge setting.  See evaluation for individualized goals.  Description: Treatment Interventions: ADL retraining, Functional transfer training, Visual perceptual retraining, UE strengthening/ROM, Endurance training, Cognitive reorientation, Patient/family training, Continued evaluation, Energy conservation, Activityengagement          See flowsheet documentation for full assessment, interventions and recommendations.   2/8/2024 1332 by Margarita Way OT  Note: Limitation: Decreased ADL status, Decreased UE strength, Decreased Safe judgement during ADL, Decreased cognition, Decreased endurance, Visual deficit, Decreased self-care trans  Prognosis: Good  Assessment: Pt seen today for OT IE admitted with altered mental status +COVID 2/7/24 with PMH of seizures,  CVA s/p ICA stent. Pt was living with his wife in a 1STH, 1 CHRIS requiring assistance for self help skills, dep IADLs, RW for mobility prior to admission. Pt presents today with decreased act daxa, balance deficits, decreased act daxa, word retrieval issues, generalized weaknesss and functional cog deficits affecting his indep and saefty with all functional tasks. Pt requires ModAx2 for supine to sit, MinAx1 for sit to standind, Sup for stand to sit and ModAx1 for functional mobility with RW. Increased time required for all tasks. UB gown management performed with ModA Valery for grooming in seated position Pt is dep for donning socks. Pt is  below baseline functioning level, recommend cont OT with level II discharge based on AMPA score of 15.     Rehab Resource Intensity Level, OT: II (Moderate Resource Intensity)

## 2024-02-08 NOTE — QUICK NOTE
Discussed with neurology. His encephalopathy likely due to covid. However will continue to monitor neuro status. If fluctuations or changes noted will need neurology eval and further work up. Called and updated his wife

## 2024-02-08 NOTE — ASSESSMENT & PLAN NOTE
Patient with history of bilateral MCA strokes, with right stenosis and left stenosis with left ICA stent placement in May 2023  Baseline GCS 12, per EMS  GCS at time of admission 13, eye-opening to sound, confused, obeying commands  Follows with neurology outpatient  Per last neurology note BP goal of <130/80  Continue aspirin, Plavix, statin

## 2024-02-08 NOTE — H&P
[facility]  H&P  Name: Constanza Zhu 64 y.o. male I MRN: [id number]  Unit/Bed#: ED 04 I Date of Admission: 2/7/2024   Date of Service: 2/8/2024 I Hospital Day: 0      Assessment/Plan   * AMS (altered mental status)  Assessment & Plan  Patient with history of NPH, hypertension induced encephalopathy, recent diagnosis of COVID was found to have increased lethargy and confusion around 3 PM this afternoon when his wife came to check on him.  Baseline GCS is typically greater than 12 given prior history of multiple strokes.  GCS at time of admission 13, patient is only oriented to self  CT head and neck:  No changes from prior imaging studies, no intracranial hemorrhage or mass, stable old infarcts  Altered mental status likely in the setting of COVID infection, mental status returned to baseline  Continue to monitor neuro status    Generalized weakness  Assessment & Plan  Patient with a history of generalized weakness, prior admission discharged to [facility].  Patient arrives from home.  Typically walks with a walker, however was too weak to get out of bed today  PT/OT eval and treat  Weakness likely due to COVID infection    COVID  Assessment & Plan  Patient admitted in October 2023 for sepsis due to COVID, given IV remdesivir  Patient noted to have a fever of 101 at home today and was given 2 Tylenol, patient is afebrile at this time  Patient saturating at 95% on room air   Started on IV remdesivir     Hypokalemia  Assessment & Plan  Patient with potassium of 3.2 on admission  Received 40 mill equivalents potassium in the ED  Continue isolate IVF  Recheck BMP in the a.m.    History of stroke  Assessment & Plan  Patient with history of bilateral MCA strokes, with right stenosis and left stenosis with left ICA stent placement in May 2023  Baseline GCS 12, per EMS  GCS at time of admission 13, eye-opening to sound, confused, obeying commands  Follows with neurology outpatient  Per  last neurology note BP goal of <130/80  Continue aspirin, Plavix, statin    Focal epilepsy (HCC)  Assessment & Plan  Patient with history of staring episodes, felt to be absence seizure's  Follows with neurology as outpatient  continue Keppra 500 Mg    History of prediabetes  Assessment & Plan  Continue carb controlled diet and monitor sugars with BMP    Primary hypertension  Assessment & Plan  Continue losartan and metoprolol  Patient hypertensive on admission, as needed labetalol IV for SBP greater than 180    GERD (gastroesophageal reflux disease)  Assessment & Plan  Continue Protonix 20 mg       VTE Pharmacologic Prophylaxis: VTE Score: 8 High Risk (Score >/= 5) - Pharmacological DVT Prophylaxis Ordered: enoxaparin (Lovenox). Sequential Compression Devices Ordered.  Code Status: Level 1 - Full Code per wife  Discussion with family: Updated  (wife) via phone.    Anticipated Length of Stay: Patient will be admitted on an observation basis with an anticipated length of stay of less than 2 midnights secondary to generalized weakness.    Total Time Spent on Date of Encounter in care of patient: 75 mins. This time was spent on one or more of the following: performing physical exam; counseling and coordination of care; obtaining or reviewing history; documenting in the medical record; reviewing/ordering tests, medications or procedures; communicating with other healthcare professionals and discussing with patient's family/caregivers.    Chief Complaint: Altered mental status    History of Present Illness:  Constanza Zhu is a 64 y.o. male with a PMH of stroke, prediabetes, hypertension who presents with altered mental status beginning at 3 PM this afternoon.  Patient awoke with a cough and found to have a fever of 101 at home.  His wife was caring for him, she rechecked on him at 3 PM and noticed him to have increased lethargy and confusion as well as increased generalized weakness.  She states that at  his baseline he is able to ambulate with a walker, however he was unable to get out of bed today and use the walker.  Patient was found to be COVID-19 positive in the ED.  Patient's wife does not feel comfortable taking him home given that he cannot ambulate and has increased confusion.  Patient denies any headache, lightheadedness, chest pain, shortness of breath, N/B/D, rash, swelling.    Review of Systems:  Review of Systems   Constitutional:  Negative for chills and fever.   HENT:  Negative for ear pain and sore throat.    Eyes:  Negative for pain and visual disturbance.   Respiratory:  Positive for cough. Negative for chest tightness and shortness of breath.    Cardiovascular:  Negative for chest pain and palpitations.   Gastrointestinal:  Negative for abdominal pain and vomiting.   Genitourinary:  Negative for dysuria and hematuria.   Musculoskeletal:  Negative for arthralgias and back pain.   Skin:  Negative for color change and rash.   Neurological:  Negative for seizures and syncope.   Psychiatric/Behavioral:  Positive for confusion.    All other systems reviewed and are negative.      Past Medical and Surgical History:   Past Medical History:   Diagnosis Date    Depression     Diabetes mellitus (HCC)     patient denies    Dyslipidemia 03/26/2019    GERD (gastroesophageal reflux disease)     Hyperlipidemia     Hypertension     Prediabetes     Prediabetes 04/03/2019    Stroke (HCC)        Past Surgical History:   Procedure Laterality Date    ARTHROSCOPIC REPAIR ACL Left     BACK SURGERY      twice    CARPAL TUNNEL RELEASE Right     IR CEREBRAL ANGIOGRAPHY  05/04/2023    IR STROKE ALERT  03/19/2019    SHOULDER SURGERY Left     US GUIDED THYROID BIOPSY  11/21/2023       Meds/Allergies:  Prior to Admission medications    Medication Sig Start Date End Date Taking? Authorizing Provider   pantoprazole (PROTONIX) 20 mg tablet Take 20 mg by mouth daily 1/15/24  Yes Historical Provider, MD   acetaminophen (TYLENOL)  325 mg tablet Take 3 tablets (975 mg total) by mouth 3 (three) times a day as needed for mild pain, headaches or fever 6/5/23   Alexis Silvestre MD   aspirin (ECOTRIN LOW STRENGTH) 81 mg EC tablet Take 1 tablet (81 mg total) by mouth daily Do not start before April 19, 2023. 4/19/23   Aminah Piper PA-C   atorvastatin (LIPITOR) 40 mg tablet Take 40 mg by mouth daily with breakfast    Historical Provider, MD   baclofen 5 MG TABS Take 5 mg by mouth 2 (two) times a day as needed for muscle spasms 6/5/23   Alexis Silvestre MD   donepezil (ARICEPT) 10 mg tablet Take 1 tablet (10 mg total) by mouth in the morning 6/13/23   Maddie Steele MD   hydrochlorothiazide (HYDRODIURIL) 12.5 mg tablet  10/24/23   Historical Provider, MD   levETIRAcetam (Keppra) 500 mg tablet Take 1 tab (500 mg) twice a day for 1 week, then take 2 tabs (1000 mg) twice a day.  Patient taking differently: Take 500 mg by mouth 2 (two) times a day On recent neuro note, pt is 500mg bid 8/31/23   Mickey Brasher MD   lidocaine (Lidoderm) 5 % Apply 1 patch topically over 12 hours daily Remove & Discard patch within 12 hours or as directed by MD 4/26/23   Mickey Meier DO   losartan (COZAAR) 50 mg tablet Take 50 mg by mouth daily    Historical Provider, MD   MELATONIN GUMMIES PO Take 5 mg by mouth daily at bedtime    Historical Provider, MD   metoprolol succinate (TOPROL-XL) 100 mg 24 hr tablet Take 100 mg by mouth daily    Historical Provider, MD   tamsulosin (FLOMAX) 0.4 mg Take 1 capsule (0.4 mg total) by mouth daily with dinner 11/15/23   Diana Isabel PA-C   ticagrelor (BRILINTA) 90 MG Take 1 tablet (90 mg total) by mouth every 12 (twelve) hours 11/21/23   Maddie Steele MD   traZODone (DESYREL) 100 mg tablet Take 100 mg by mouth daily at bedtime 6/28/23   Historical Provider, MD   pantoprazole (PROTONIX) 40 mg tablet Take 1 tablet (40 mg total) by mouth daily  Patient taking differently: Take 20 mg by mouth daily 6/20/23 2/8/24  Maddie  GLEN Steele MD     I have reviewed home medications using recent Epic encounter.    Allergies:   Allergies   Allergen Reactions    Flexeril [Cyclobenzaprine] Drowsiness    Lisinopril Cough    Nsaids Confusion       Social History:  Marital Status: /Civil Union   Occupation: n/a  Patient Pre-hospital Living Situation: Home  Patient Pre-hospital Level of Mobility: walks with walker  Patient Pre-hospital Diet Restrictions: carb controlled  Substance Use History:   Social History     Substance and Sexual Activity   Alcohol Use Never     Social History     Tobacco Use   Smoking Status Former   Smokeless Tobacco Never     Social History     Substance and Sexual Activity   Drug Use Never       Family History:  Family History   Problem Relation Age of Onset    Hyperlipidemia Mother     Hypertension Mother     Diabetes unspecified Mother         prediabetes    Heart attack Mother     Diabetes Mother     Hyperlipidemia Father     Hypertension Father     Prostate cancer Father     Stroke Father     Hyperlipidemia Sister     Hypertension Sister     Hyperlipidemia Sister     Hypertension Sister     Depression Sister     Hypertension Sister     Hyperlipidemia Sister     Depression Sister     Hyperlipidemia Brother     Hypertension Brother     Hypertension Brother     Prostate cancer Brother     Hypothyroidism Daughter     Hypothyroidism Son     Diabetes unspecified Son     Seizures Neg Hx        Physical Exam:     Vitals:   Blood Pressure: (!) 187/85 (02/08/24 0300)  Pulse: (!) 106 (02/08/24 0300)  Temperature: 99.7 °F (37.6 °C) (02/07/24 1821)  Temp Source: Oral (02/07/24 1821)  Respirations: 20 (02/08/24 0300)  SpO2: 95 % (02/08/24 0300)    Physical Exam  Vitals and nursing note reviewed.   Constitutional:       General: He is not in acute distress.     Appearance: He is well-developed.   HENT:      Head: Normocephalic and atraumatic.   Eyes:      Extraocular Movements: Extraocular movements intact.       Conjunctiva/sclera: Conjunctivae normal.      Pupils: Pupils are equal, round, and reactive to light.   Cardiovascular:      Rate and Rhythm: Regular rhythm. Tachycardia present.      Heart sounds: Normal heart sounds. No murmur heard.  Pulmonary:      Effort: Pulmonary effort is normal. No respiratory distress.      Breath sounds: Normal breath sounds.   Abdominal:      General: Bowel sounds are normal.      Palpations: Abdomen is soft.      Tenderness: There is no abdominal tenderness.   Musculoskeletal:         General: No swelling.      Cervical back: Neck supple.   Skin:     General: Skin is warm and dry.      Capillary Refill: Capillary refill takes less than 2 seconds.   Neurological:      Mental Status: He is alert. He is disoriented and confused.      GCS: GCS eye subscore is 3. GCS verbal subscore is 4. GCS motor subscore is 6.      Sensory: Sensation is intact.      Motor: Motor function is intact.   Psychiatric:         Mood and Affect: Mood normal.         Additional Data:     Lab Results:  Results from last 7 days   Lab Units 02/07/24  1824   WBC Thousand/uL 10.85*   HEMOGLOBIN g/dL 13.4   HEMATOCRIT % 40.5   PLATELETS Thousands/uL 219   NEUTROS PCT % 81*   LYMPHS PCT % 8*   MONOS PCT % 9   EOS PCT % 1     Results from last 7 days   Lab Units 02/07/24  1824   SODIUM mmol/L 135   POTASSIUM mmol/L 3.2*   CHLORIDE mmol/L 100   CO2 mmol/L 26   BUN mg/dL 21   CREATININE mg/dL 0.99   ANION GAP mmol/L 9   CALCIUM mg/dL 9.7   ALBUMIN g/dL 4.3   TOTAL BILIRUBIN mg/dL 0.54   ALK PHOS U/L 75   ALT U/L 17   AST U/L 15   GLUCOSE RANDOM mg/dL 128                       Lines/Drains:  Invasive Devices       Peripheral Intravenous Line  Duration             Peripheral IV 10/07/23 Dorsal (posterior);Right Hand 124 days    Peripheral IV 12/13/23 Distal;Left;Upper;Ventral (anterior) Arm 56 days                        Imaging: Reviewed radiology reports from this admission including: CTA head and neck  CTA head and neck  with and without contrast   Final Result by Denver Chinchilla MD (02/07 5047)      1.  No acute intracranial hemorrhage. Stable senescent changes and sequelae of multifocal old infarcts in the brain parenchyma detailed above. Paranasal sinus disease.   2.  Mild calcific atherosclerosis of the aortic arch region.  Moderate (50%) luminal narrowing of the proximal right subclavian artery due to partially calcified mixed plaque.   3.  Right vertebral artery dominance.  Moderate (50 to 70%) luminal stenosis involving the distal left vertebral artery V4 segment due to calcific atherosclerosis. Findings appear similar compared to the prior exam.   4.  Normal course and caliber of the right proximal to mid common carotid artery. Moderate (50%) luminal narrowing of the distal right common carotid artery and proximal right internal carotid artery due to irregular mixed plaque. Remainder of the right    cervical internal carotid artery appear patent with normal course and caliber.   5.  The proximal to mid left common carotid artery is widely patent with normal course and caliber. Metallic stent in the distal left common carotid artery and proximal left internal carotid artery is again seen with moderate to severe circumferential    narrowing of the proximal internal carotid artery, similar compared to the prior study. This is likely related to intimal hyperplasia. Remainder of the left cervical internal carotid artery appear grossly patent with normal course and caliber. No    occlusion or dissection.   6.  Short segment of complete occlusion involving the distal right M1 segment is again seen, similar compared to the prior study with reconstitution of more distal branches via collateral vessels. Stable appearance of slightly diminished caliber of the    distal right MCA branch vessels although they remain patent. Left middle cerebral artery and proximal branches appear grossly patent with normal course and caliber.   7.  No  enhancing brain mass/fluid collection or evidence of vascular malformation. No enhancing soft tissue neck mass/fluid collection or cervical lymphadenopathy.   8.  Stable nonspecific subcentimeter hypodense thyroid nodules requiring no imaging follow-up per current guidelines.      Workstation performed: [id number]         XR chest 1 view portable   ED Interpretation by Myriam Leger MD (02/07 1934)   No acute pulmonary pathology          EKG and Other Studies Reviewed on Admission:   EKG: NSR. HR 88.    ** Please Note: This note has been constructed using a voice recognition system. **

## 2024-02-08 NOTE — OCCUPATIONAL THERAPY NOTE
Occupational Therapy Evaluation     Patient Name: Constanza Zhu  Today's Date: 2/8/2024  Problem List  Principal Problem:    AMS (altered mental status)  Active Problems:    History of stroke    Primary hypertension    GERD (gastroesophageal reflux disease)    History of prediabetes    Hypokalemia    Focal epilepsy (HCC)    Generalized weakness    COVID    Past Medical History  Past Medical History:   Diagnosis Date    Depression     Diabetes mellitus (HCC)     patient denies    Dyslipidemia 03/26/2019    GERD (gastroesophageal reflux disease)     Hyperlipidemia     Hypertension     Prediabetes     Prediabetes 04/03/2019    Stroke (HCC)      Past Surgical History  Past Surgical History:   Procedure Laterality Date    ARTHROSCOPIC REPAIR ACL Left     BACK SURGERY      twice    CARPAL TUNNEL RELEASE Right     IR CEREBRAL ANGIOGRAPHY  05/04/2023    IR STROKE ALERT  03/19/2019    SHOULDER SURGERY Left     US GUIDED THYROID BIOPSY  11/21/2023 02/08/24 1158   OT Last Visit   OT Visit Date 02/08/24   Note Type   Note type Evaluation   Pain Assessment   Pain Assessment Tool 0-10   Pain Score No Pain   Restrictions/Precautions   Weight Bearing Precautions Per Order No   Other Precautions Contact/isolation;Airborne/isolation;Cognitive;Chair Alarm;Bed Alarm;Fall Risk;Aspiration;Seizure   Home Living   Type of Home House   Home Layout One level;Stairs to enter without rails  (1STH, 1 CHRIS)   Bathroom Shower/Tub Walk-in shower   Bathroom Equipment Shower chair   Bathroom Accessibility Accessible via wheelchair   Home Equipment Walker;Wheelchair-manual   Prior Function   Level of [place] Needs assistance with ADLs;Needs assistance with functional mobility;Needs assistance with IADLS   Lives With Spouse  (Faiza)   Receives Help From Family   IADLs Family/Friend/Other provides transportation;Family/Friend/Other provides meals;Family/Friend/Other provides medication management   Falls in the last 6 months 1 to 4    Vocational Retired  (retired )   Lifestyle   Autonomy Lives with wife in a 1STH, 1 CHRIS assist from wife for ADLs, dep IADLs, RW mobility   Reciprocal Relationships supportive wife   General   Family/Caregiver Present No   ADL   Where Assessed Chair   Grooming Assistance 4  Minimal Assistance   Grooming Deficit Verbal cueing;Brushing hair   UB Dressing Assistance 3  Moderate Assistance  (gown management)   LB Dressing Assistance 1  Total Assistance   LB Dressing Deficit Don/doff R sock;Don/doff L sock   Bed Mobility   Supine to Sit 3  Moderate assistance   Additional items Assist x 2;HOB elevated;Bedrails;Increased time required;Verbal cues;LE management   Transfers   Sit to Stand 4  Minimal assistance   Additional items Assist x 1;Increased time required;Verbal cues   Stand to Sit 5  Supervision   Additional items Assist x 1;Armrests;Increased time required;Verbal cues   Functional Mobility   Functional Mobility 3  Moderate assistance   Additional Comments Ax1 with RW   RUE Assessment   RUE Assessment WFL   LUE Assessment   LUE Assessment WFL   Vision-Basic Assessment   Current Vision   (R visual field neglect)   Cognition   Overall Cognitive Status Impaired   Arousal/Participation Cooperative   Orientation Level Oriented to person;Disoriented to place;Disoriented to time;Disoriented to situation   Following Commands Follows one step commands with increased time or repetition   Assessment   Limitation Decreased ADL status;Decreased UE strength;Decreased Safe judgement during ADL;Decreased cognition;Decreased endurance;Visual deficit;Decreased self-care trans   Prognosis Good   Assessment Pt seen today for OT IE admitted with altered mental status +COVID 2/7/24 with PMH of seizures,  CVA s/p ICA stent. Pt was living with his wife in a 1STH, 1 CHRIS requiring assistance for self help skills, dep IADLs, RW for mobility prior to admission. Pt presents today with decreased act daxa, balance deficits, decreased  act daxa, word retrieval issues, generalized weaknesss and functional cog deficits affecting his indep and saefty with all functional tasks. Pt requires ModAx2 for supine to sit, MinAx1 for sit to standind, Sup for stand to sit and ModAx1 for functional mobility with RW. Increased time required for all tasks. UB gown management performed with ModA, Valery for grooming in seated position Pt is dep for donning socks. Pt is  below baseline functioning level, recommend cont OT with level II discharge based on AMPA score of 15.   Goals   Patient Goals See below   LTG Time Frame 10-14   Plan   Treatment Interventions ADL retraining;Functional transfer training;Visual perceptual retraining;UE strengthening/ROM;Endurance training;Cognitive reorientation;Patient/family training;Continued evaluation;Energy conservation;Activityengagement   Goal Expiration Date 02/22/24   OT Treatment Day 0   OT Frequency 3-5x/wk   Discharge Recommendation   Rehab Resource Intensity Level, OT II (Moderate Resource Intensity)   AM-PAC Daily Activity Inpatient   Lower Body Dressing 2   Bathing 2   Toileting 2   Upper Body Dressing 3   Grooming 3   Eating 3   Daily Activity Raw Score 15   Daily Activity Standardized Score (Calc for Raw Score >=11) 34.69   AM-PAC Applied Cognition Inpatient   Following a Speech/Presentation 1   Understanding Ordinary Conversation 3   Taking Medications 1   Remembering Where Things Are Placed or Put Away 1   Remembering List of 4-5 Errands 1   Taking Care of Complicated Tasks 1   Applied Cognition Raw Score 8   Applied Cognition Standardized Score 19.32   End of Consult   Education Provided Yes   Patient Position at End of Consult Bedside chair   Nurse Communication Nurse aware of consult     GOALS:      -Patient will perform grooming tasks oral care with overall Supervision in order to increase overall independence    -Patient will be Supervision with UB dressing using AE and AD as needed in order to increase (I)  with ADLs    -Patient will be Min A  with UB bathing using AE and AD as needed in order to increase (I) with ADLs    -Patient will be Mod A  with LB dressing with use of AE and AD as needed in order to increase (I) with ADLs    -Patient will be Mod A  with LB bathing with use of AE and AD as needed in order to increase (I) with ADLs    -Patient will complete toileting w/ Min A  w/ G hygiene/thoroughness in order to reduce caregiver burden    -Patient will demonstrate Supervision with bed mobility for ability to manage own comfort and initiate OOB tasks.     -Patient will perform functional transfers with Supervision to/from all surfaces using DME as needed in order to increase (I) with functional tasks    -Patient will be Supervision with functional mobility to/from bathroom for increased independence with toileting tasks    -Patient will tolerate therapeutic activities for greater than 15 min, in order to increase tolerance for functional activities.     -Patient will increase OOB/sitting tolerance to 2-4 hours per day to increase participation in self-care and leisure tasks with no s/s of exertion.     COGNITION    -Patient will engage in ongoing cognitive assessment in order to assist with safe discharge planning/recommendations.    -Patient will follow 2 step instructions with no VC in order to safely complete functional tasks     -Patient will attend to functional task for 3 min without VC for attention/redirection     -Patient will complete dressing tasks with maximum of 3 verbal and/or visual cues in order to compensate for STM deficits    -Patient’s caregivers will be independent with providing appropriate cognitive cues in order to facilitate patient’s independence during functional tasks.      -Patient will participate in 10m UE therex to increase overall stamina/activity tolerance for purposeful tasks

## 2024-02-08 NOTE — PLAN OF CARE
Problem: OCCUPATIONAL THERAPY ADULT  Goal: Performs self-care activities at highest level of function for planned discharge setting.  See evaluation for individualized goals.  Description: Treatment Interventions: ADL retraining, Functional transfer training, Visual perceptual retraining, UE strengthening/ROM, Endurance training, Cognitive reorientation, Patient/family training, Continued evaluation, Energy conservation, Activityengagement          See flowsheet documentation for full assessment, interventions and recommendations.   Note: Limitation: Decreased ADL status, Decreased UE strength, Decreased Safe judgement during ADL, Decreased cognition, Decreased endurance, Visual deficit, Decreased self-care trans  Prognosis: Good  Assessment: Pt seen today for OT IE admitted with altered mental status +COVID 2/7/24 with PMH of seizures,  CVA s/p ICA stent. Pt was living with his wife in a 1STH, 1 CHRIS requiring assistance for self help skills, dep IADLs, RW for mobility prior to admission. Pt presents today with decreased act daxa, balance deficits, decreased act daxa, word retrieval issues, generalized weaknesss and functional cog deficits affecting his indep and saefty with all functional tasks. Pt requires ModAx2 for supine to sit, MinAx1 for sit to standind, Sup for stand to sit and ModAx1 for functional mobility with RW. Increased time required for all tasks. UB gown management performed with ModA, Valery for grooming in seated position Pt is dep for donning socks. Pt is  below baseline functioning level, recommend cont OT with level II discharge based on AMPA score of 15.     Rehab Resource Intensity Level, OT: II (Moderate Resource Intensity)

## 2024-02-08 NOTE — ED NOTES
Attempted to walk patient with walker. Patient unsteady on feet and unable to stand.      Terri Eastman  02/07/24 0145

## 2024-02-08 NOTE — PLAN OF CARE
Problem: OCCUPATIONAL THERAPY ADULT  Goal: Performs self-care activities at highest level of function for planned discharge setting.  See evaluation for individualized goals.  Description: Treatment Interventions: ADL retraining, Functional transfer training, Visual perceptual retraining, UE strengthening/ROM, Endurance training, Cognitive reorientation, Patient/family training, Continued evaluation, Energy conservation, Activityengagement          See flowsheet documentation for full assessment, interventions and recommendations.   2/8/2024 1553 by Margarita Way OT  Outcome: Progressing  Note: Limitation: Decreased ADL status, Decreased UE strength, Decreased Safe judgement during ADL, Decreased cognition, Decreased endurance, Visual deficit, Decreased self-care trans  Prognosis: Good  Assessment: Pt seen today for OT IE admitted with altered mental status +COVID 2/7/24 with PMH of seizures,  CVA s/p ICA stent. Pt was living with his wife in a 1STH, 1 CHRIS requiring assistance for self help skills, dep IADLs, RW for mobility prior to admission. Pt presents today with decreased act daxa, balance deficits, decreased act daxa, word retrieval issues, generalized weaknesss and functional cog deficits affecting his indep and saefty with all functional tasks. Pt requires ModAx2 for supine to sit, MinAx1 for sit to standind, Sup for stand to sit and ModAx1 for functional mobility with RW. Increased time required for all tasks. UB gown management performed with ModA Valery for grooming in seated position Pt is dep for donning socks. Pt is  below baseline functioning level, recommend cont OT with level II discharge based on AMPA score of 15.     Rehab Resource Intensity Level, OT: II (Moderate Resource Intensity)       2/8/2024 1332 by Margarita Way OT  Note: Limitation: Decreased ADL status, Decreased UE strength, Decreased Safe judgement during ADL, Decreased cognition, Decreased endurance, Visual deficit, Decreased  self-care trans  Prognosis: Good  Assessment: Pt seen today for OT IE admitted with altered mental status +COVID 2/7/24 with PMH of seizures,  CVA s/p ICA stent. Pt was living with his wife in a 1STH, 1 CHRIS requiring assistance for self help skills, dep IADLs, RW for mobility prior to admission. Pt presents today with decreased act daxa, balance deficits, decreased act daxa, word retrieval issues, generalized weaknesss and functional cog deficits affecting his indep and saefty with all functional tasks. Pt requires ModAx2 for supine to sit, MinAx1 for sit to standind, Sup for stand to sit and ModAx1 for functional mobility with RW. Increased time required for all tasks. UB gown management performed with ModA, Valery for grooming in seated position Pt is dep for donning socks. Pt is  below baseline functioning level, recommend cont OT with level II discharge based on AMPA score of 15.     Rehab Resource Intensity Level, OT: II (Moderate Resource Intensity)       2/8/2024 1331 by Margarita Way OT  Outcome: Progressing  Note: Limitation: Decreased ADL status, Decreased UE strength, Decreased Safe judgement during ADL, Decreased cognition, Decreased endurance, Visual deficit, Decreased self-care trans  Prognosis: Good  Assessment: Pt seen today for OT IE admitted with altered mental status +COVID 2/7/24 with PMH of seizures,  CVA s/p ICA stent. Pt was living with his wife in a 1STH, 1 CHIRS requiring assistance for self help skills, dep IADLs, RW for mobility prior to admission. Pt presents today with decreased act daxa, balance deficits, decreased act daxa, word retrieval issues, generalized weaknesss and functional cog deficits affecting his indep and saefty with all functional tasks. Pt requires ModAx2 for supine to sit, MinAx1 for sit to standind, Sup for stand to sit and ModAx1 for functional mobility with RW. Increased time required for all tasks. UB gown management performed with ModA, Valery for grooming in seated  position Pt is dep for donning socks. Pt is  below baseline functioning level, recommend cont OT with level II discharge based on AMPA score of 15.     Rehab Resource Intensity Level, OT: II (Moderate Resource Intensity)       2/8/2024 1330 by Margarita Way OT  Note: Limitation: Decreased ADL status, Decreased UE strength, Decreased Safe judgement during ADL, Decreased cognition, Decreased endurance, Visual deficit, Decreased self-care trans  Prognosis: Good  Assessment: Pt seen today for OT IE admitted with altered mental status +COVID 2/7/24 with PMH of seizures,  CVA s/p ICA stent. Pt was living with his wife in a 1STH, 1 CHRIS requiring assistance for self help skills, dep IADLs, RW for mobility prior to admission. Pt presents today with decreased act daxa, balance deficits, decreased act daxa, word retrieval issues, generalized weaknesss and functional cog deficits affecting his indep and saefty with all functional tasks. Pt requires ModAx2 for supine to sit, MinAx1 for sit to standind, Sup for stand to sit and ModAx1 for functional mobility with RW. Increased time required for all tasks. UB gown management performed with ModA, Valery for grooming in seated position Pt is dep for donning socks. Pt is  below baseline functioning level, recommend cont OT with level II discharge based on AMPA score of 15.     Rehab Resource Intensity Level, OT: II (Moderate Resource Intensity)       2/8/2024 1327 by Margarita Way OT  Note: Limitation: Decreased ADL status, Decreased UE strength, Decreased Safe judgement during ADL, Decreased cognition, Decreased endurance, Visual deficit, Decreased self-care trans  Prognosis: Good  Assessment: Pt seen today for OT IE admitted with altered mental status +COVID 2/7/24 with PMH of seizures,  CVA s/p ICA stent. Pt was living with his wife in a 1STH, 1 CHRIS requiring assistance for self help skills, dep IADLs, RW for mobility prior to admission. Pt presents today with decreased act daxa,  balance deficits, decreased act daxa, word retrieval issues, generalized weaknesss and functional cog deficits affecting his indep and saefty with all functional tasks. Pt requires ModAx2 for supine to sit, MinAx1 for sit to standind, Sup for stand to sit and ModAx1 for functional mobility with RW. Increased time required for all tasks. UB gown management performed with ModA, Valery for grooming in seated position Pt is dep for donning socks. Pt is  below baseline functioning level, recommend cont OT with level II discharge based on AMPA score of 15.     Rehab Resource Intensity Level, OT: II (Moderate Resource Intensity)

## 2024-02-08 NOTE — ASSESSMENT & PLAN NOTE
Patient with potassium of 3.2 on admission  Received 40 mill equivalents potassium in the ED  Continue isolate IVF  Hold home HTZ 12.5mg  Recheck BMP in the a.m.

## 2024-02-08 NOTE — ASSESSMENT & PLAN NOTE
Patient with history of staring episodes, felt to be absence seizure's  Follows with neurology as outpatient  continue Keppra 500 Mg

## 2024-02-08 NOTE — PLAN OF CARE
Problem: PAIN - ADULT  Goal: Verbalizes/displays adequate comfort level or baseline comfort level  Description: Interventions:  - Encourage patient to monitor pain and request assistance  - Assess pain using appropriate pain scale  - Administer analgesics based on type and severity of pain and evaluate response  - Implement non-pharmacological measures as appropriate and evaluate response  - Consider cultural and social influences on pain and pain management  - Notify physician/advanced practitioner if interventions unsuccessful or patient reports new pain  Outcome: Progressing     Problem: INFECTION - ADULT  Goal: Absence or prevention of progression during hospitalization  Description: INTERVENTIONS:  - Assess and monitor for signs and symptoms of infection  - Monitor lab/diagnostic results  - Monitor all insertion sites, i.e. indwelling lines, tubes, and drains  - Monitor endotracheal if appropriate and nasal secretions for changes in amount and color  - [place] appropriate cooling/warming therapies per order  - Administer medications as ordered  - Instruct and encourage patient and family to use good hand hygiene technique  - Identify and instruct in appropriate isolation precautions for identified infection/condition  Outcome: Progressing     Problem: SAFETY ADULT  Goal: Patient will remain free of falls  Description: INTERVENTIONS:  - Educate patient/family on patient safety including physical limitations  - Instruct patient to call for assistance with activity   - Consult OT/PT to assist with strengthening/mobility   - Keep Call bell within reach  - Keep bed low and locked with side rails adjusted as appropriate  - Keep care items and personal belongings within reach  - Initiate and maintain comfort rounds  - Make Fall Risk Sign visible to staff  - Offer Toileting every x Hours, in advance of need  - Initiate/Maintain xalarm  - Obtain necessary fall risk management equipment: x  - Apply yellow socks and  bracelet for high fall risk patients  - Consider moving patient to room near nurses station  Outcome: Progressing  Goal: Maintain or return to baseline ADL function  Description: INTERVENTIONS:  -  Assess patient's ability to carry out ADLs; assess patient's baseline for ADL function and identify physical deficits which impact ability to perform ADLs (bathing, care of mouth/teeth, toileting, grooming, dressing, etc.)  - Assess/evaluate cause of self-care deficits   - Assess range of motion  - Assess patient's mobility; develop plan if impaired  - Assess patient's need for assistive devices and provide as appropriate  - Encourage maximum independence but intervene and supervise when necessary  - Involve family in performance of ADLs  - Assess for home care needs following discharge   - Consider OT consult to assist with ADL evaluation and planning for discharge  - Provide patient education as appropriate  Outcome: Progressing  Goal: Maintains/Returns to pre admission functional level  Description: INTERVENTIONS:  - Perform AM-PAC 6 Click Basic Mobility/ Daily Activity assessment daily.  - Set and communicate daily mobility goal to care team and patient/family/caregiver.   - Collaborate with rehabilitation services on mobility goals if consulted  - Perform Range of Motion x times a day.  - Reposition patient every x hours.  - Dangle patient x times a day  - Stand patient x times a day  - Ambulate patient x times a day  - Out of bed to chair x times a day   - Out of bed for meals x times a day  - Out of bed for toileting  - Record patient progress and toleration of activity level   Outcome: Progressing     Problem: DISCHARGE PLANNING  Goal: Discharge to home or other facility with appropriate resources  Description: INTERVENTIONS:  - Identify barriers to discharge w/patient and caregiver  - Arrange for needed discharge resources and transportation as appropriate  - Identify discharge learning needs (meds, wound care,  etc.)  - Arrange for interpretive services to assist at discharge as needed  - Refer to Case Management Department for coordinating discharge planning if the patient needs post-hospital services based on physician/advanced practitioner order or complex needs related to functional status, cognitive ability, or social support system  Outcome: Progressing     Problem: Knowledge Deficit  Goal: Patient/family/caregiver demonstrates understanding of disease process, treatment plan, medications, and discharge instructions  Description: Complete learning assessment and assess knowledge base.  Interventions:  - Provide teaching at level of understanding  - Provide teaching via preferred learning methods  Outcome: Progressing     Problem: Nutrition/Hydration-ADULT  Goal: Nutrient/Hydration intake appropriate for improving, restoring or maintaining nutritional needs  Description: Monitor and assess patient's nutrition/hydration status for malnutrition. Collaborate with interdisciplinary team and initiate plan and interventions as ordered.  Monitor patient's weight and dietary intake as ordered or per policy. Utilize nutrition screening tool and intervene as necessary. Determine patient's food preferences and provide high-protein, high-caloric foods as appropriate.     INTERVENTIONS:  - Monitor oral intake, urinary output, labs, and treatment plans  - Assess nutrition and hydration status and recommend course of action  - Evaluate amount of meals eaten  - Assist patient with eating if necessary   - Allow adequate time for meals  - Recommend/ encourage appropriate diets, oral nutritional supplements, and vitamin/mineral supplements  - Order, calculate, and assess calorie counts as needed  - Recommend, monitor, and adjust tube feedings and TPN/PPN based on assessed needs  - Assess need for intravenous fluids  - Provide specific nutrition/hydration education as appropriate  - Include patient/family/caregiver in decisions related  to nutrition  Outcome: Progressing     Problem: MOBILITY - ADULT  Goal: Maintain or return to baseline ADL function  Description: INTERVENTIONS:  -  Assess patient's ability to carry out ADLs; assess patient's baseline for ADL function and identify physical deficits which impact ability to perform ADLs (bathing, care of mouth/teeth, toileting, grooming, dressing, etc.)  - Assess/evaluate cause of self-care deficits   - Assess range of motion  - Assess patient's mobility; develop plan if impaired  - Assess patient's need for assistive devices and provide as appropriate  - Encourage maximum independence but intervene and supervise when necessary  - Involve family in performance of ADLs  - Assess for home care needs following discharge   - Consider OT consult to assist with ADL evaluation and planning for discharge  - Provide patient education as appropriate  Outcome: Progressing  Goal: Maintains/Returns to pre admission functional level  Description: INTERVENTIONS:  - Perform AM-PAC 6 Click Basic Mobility/ Daily Activity assessment daily.  - Set and communicate daily mobility goal to care team and patient/family/caregiver.   - Collaborate with rehabilitation services on mobility goals if consulted  - Perform Range of Motion x times a day.  - Reposition patient every x hours.  - Dangle patient x times a day  - Stand patient x times a day  - Ambulate patient x times a day  - Out of bed to chair x times a day   - Out of bed for meals x times a day  - Out of bed for toileting  - Record patient progress and toleration of activity level   Outcome: Progressing     Problem: NEUROSENSORY - ADULT  Goal: Achieves stable or improved neurological status  Description: INTERVENTIONS  - Monitor and report changes in neurological status  - Monitor vital signs such as temperature, blood pressure, glucose, and any other labs ordered   - Initiate measures to prevent increased intracranial pressure  - Monitor for seizure activity and  implement precautions if appropriate      Outcome: Progressing  Goal: Remains free of injury related to seizures activity  Description: INTERVENTIONS  - Maintain airway, patient safety  and administer oxygen as ordered  - Monitor patient for seizure activity, document and report duration and description of seizure to physician/advanced practitioner  - If seizure occurs,  ensure patient safety during seizure  - Reorient patient post seizure  - Seizure pads on all 4 side rails  - Instruct patient/family to notify RN of any seizure activity including if an aura is experienced  - Instruct patient/family to call for assistance with activity based on nursing assessment  - Administer anti-seizure medications if ordered    Outcome: Progressing  Goal: Achieves maximal functionality and self care  Description: INTERVENTIONS  - Monitor swallowing and airway patency with patient fatigue and changes in neurological status  - Encourage and assist patient to increase activity and self care.   - Encourage visually impaired, hearing impaired and aphasic patients to use assistive/communication devices  Outcome: Progressing     Problem: CARDIOVASCULAR - ADULT  Goal: Maintains optimal cardiac output and hemodynamic stability  Description: INTERVENTIONS:  - Monitor I/O, vital signs and rhythm  - Monitor for S/S and trends of decreased cardiac output  - Administer and titrate ordered vasoactive medications to optimize hemodynamic stability  - Assess quality of pulses, skin color and temperature  - Assess for signs of decreased coronary artery perfusion  - Instruct patient to report change in severity of symptoms  Outcome: Progressing  Goal: Absence of cardiac dysrhythmias or at baseline rhythm  Description: INTERVENTIONS:  - Continuous cardiac monitoring, vital signs, obtain 12 lead EKG if ordered  - Administer antiarrhythmic and heart rate control medications as ordered  - Monitor electrolytes and administer replacement therapy as  ordered  Outcome: Progressing     Problem: RESPIRATORY - ADULT  Goal: Achieves optimal ventilation and oxygenation  Description: INTERVENTIONS:  - Assess for changes in respiratory status  - Assess for changes in mentation and behavior  - Position to facilitate oxygenation and minimize respiratory effort  - Oxygen administered by appropriate delivery if ordered  - Initiate smoking cessation education as indicated  - Encourage broncho-pulmonary hygiene including cough, deep breathe, Incentive Spirometry  - Assess the need for suctioning and aspirate as needed  - Assess and instruct to report SOB or any respiratory difficulty  - Respiratory Therapy support as indicated  Outcome: Progressing     Problem: METABOLIC, FLUID AND ELECTROLYTES - ADULT  Goal: Electrolytes maintained within normal limits  Description: INTERVENTIONS:  - Monitor labs and assess patient for signs and symptoms of electrolyte imbalances  - Administer electrolyte replacement as ordered  - Monitor response to electrolyte replacements, including repeat lab results as appropriate  - Instruct patient on fluid and nutrition as appropriate  Outcome: Progressing  Goal: Fluid balance maintained  Description: INTERVENTIONS:  - Monitor labs   - Monitor I/O and WT  - Instruct patient on fluid and nutrition as appropriate  - Assess for signs & symptoms of volume excess or deficit  Outcome: Progressing  Goal: Glucose maintained within target range  Description: INTERVENTIONS:  - Monitor Blood Glucose as ordered  - Assess for signs and symptoms of hyperglycemia and hypoglycemia  - Administer ordered medications to maintain glucose within target range  - Assess nutritional intake and initiate nutrition service referral as needed  Outcome: Progressing     Problem: SKIN/TISSUE INTEGRITY - ADULT  Goal: Skin Integrity remains intact(Skin Breakdown Prevention)  Description: Assess:  -Perform Arnaud assessment every x  -Clean and moisturize skin every x  -Inspect skin  when repositioning, toileting, and assisting with ADLS  -Assess under medical devices such as x every x  -Assess extremities for adequate circulation and sensation     Bed Management:  -Have minimal linens on bed & keep smooth, unwrinkled  -Change linens as needed when moist or perspiring  -Avoid sitting or lying in one position for more than x hours while in bed  -Keep HOB at xdegrees     Toileting:  -Offer bedside commode  -Assess for incontinence every x  -Use incontinent care products after each incontinent episode such as x    Activity:  -Mobilize patient x times a day  -Encourage activity and walks on unit  -Encourage or provide ROM exercises   -Turn and reposition patient every x Hours  -Use appropriate equipment to lift or move patient in bed  -Instruct/ Assist with weight shifting every x when out of bed in chair  -Consider limitation of chair time x hour intervals    Skin Care:  -Avoid use of baby powder, tape, friction and shearing, hot water or constrictive clothing  -Relieve pressure over bony prominences using x  -Do not massage red bony areas    Next Steps:  -Teach patient strategies to minimize risks such as xxx   -Consider consults to  interdisciplinary teams such as x  Outcome: Progressing  Goal: Incision(s), wounds(s) or drain site(s) healing without S/S of infection  Description: INTERVENTIONS  - Assess and document dressing, incision, wound bed, drain sites and surrounding tissue  - Provide patient and family education  - Perform skin care/dressing changes every x  Outcome: Progressing  Goal: Pressure injury heals and does not worsen  Description: Interventions:  - Implement low air loss mattress or specialty surface (Criteria met)  - Apply silicone foam dressing  - Instruct/assist with weight shifting every x minutes when in chair   - Limit chair time to x hour intervals  - Use special pressure reducing interventions such as x when in chair   - Apply fecal or urinary incontinence containment  device   - Perform passive or active ROM every x  - Turn and reposition patient & offload bony prominences every x hours   - Utilize friction reducing device or surface for transfers   - Consider consults to  interdisciplinary teams such as x  - Use incontinent care products after each incontinent episode such as x  - Consider nutrition services referral as needed  Outcome: Progressing     Problem: MUSCULOSKELETAL - ADULT  Goal: Maintain or return mobility to safest level of function  Description: INTERVENTIONS:  - Assess patient's ability to carry out ADLs; assess patient's baseline for ADL function and identify physical deficits which impact ability to perform ADLs (bathing, care of mouth/teeth, toileting, grooming, dressing, etc.)  - Assess/evaluate cause of self-care deficits   - Assess range of motion  - Assess patient's mobility  - Assess patient's need for assistive devices and provide as appropriate  - Encourage maximum independence but intervene and supervise when necessary  - Involve family in performance of ADLs  - Assess for home care needs following discharge   - Consider OT consult to assist with ADL evaluation and planning for discharge  - Provide patient education as appropriate  Outcome: Progressing  Goal: Maintain proper alignment of affected body part  Description: INTERVENTIONS:  - Support, maintain and protect limb and body alignment  - Provide patient/ family with appropriate education  Outcome: Progressing

## 2024-02-08 NOTE — PLAN OF CARE
Problem: OCCUPATIONAL THERAPY ADULT  Goal: Performs self-care activities at highest level of function for planned discharge setting.  See evaluation for individualized goals.  Description: Treatment Interventions: ADL retraining, Functional transfer training, Visual perceptual retraining, UE strengthening/ROM, Endurance training, Cognitive reorientation, Patient/family training, Continued evaluation, Energy conservation, Activityengagement          See flowsheet documentation for full assessment, interventions and recommendations.   2/8/2024 1330 by Margarita Way OT  Note: Limitation: Decreased ADL status, Decreased UE strength, Decreased Safe judgement during ADL, Decreased cognition, Decreased endurance, Visual deficit, Decreased self-care trans  Prognosis: Good  Assessment: Pt seen today for OT IE admitted with altered mental status +COVID 2/7/24 with PMH of seizures,  CVA s/p ICA stent. Pt was living with his wife in a 1[facility], 1 CHRIS requiring assistance for self help skills, dep IADLs, RW for mobility prior to admission. Pt presents today with decreased act daxa, balance deficits, decreased act daxa, word retrieval issues, generalized weaknesss and functional cog deficits affecting his indep and saefty with all functional tasks. Pt requires ModAx2 for supine to sit, MinAx1 for sit to standind, Sup for stand to sit and ModAx1 for functional mobility with RW. Increased time required for all tasks. UB gown management performed with ModA Valery for grooming in seated position Pt is dep for donning socks. Pt is  below baseline functioning level, recommend cont OT with level II discharge based on AMPA score of 15.     Rehab Resource Intensity Level, OT: II (Moderate Resource Intensity)       2/8/2024 1327 by Margarita Way OT  Note: Limitation: Decreased ADL status, Decreased UE strength, Decreased Safe judgement during ADL, Decreased cognition, Decreased endurance, Visual deficit, Decreased self-care  trans  Prognosis: Good  Assessment: Pt seen today for OT IE admitted with altered mental status +COVID 2/7/24 with PMH of seizures,  CVA s/p ICA stent. Pt was living with his wife in a 1STH, 1 CHRIS requiring assistance for self help skills, dep IADLs, RW for mobility prior to admission. Pt presents today with decreased act daxa, balance deficits, decreased act daxa, word retrieval issues, generalized weaknesss and functional cog deficits affecting his indep and saefty with all functional tasks. Pt requires ModAx2 for supine to sit, MinAx1 for sit to standind, Sup for stand to sit and ModAx1 for functional mobility with RW. Increased time required for all tasks. UB gown management performed with ModA, Valery for grooming in seated position Pt is dep for donning socks. Pt is  below baseline functioning level, recommend cont OT with level II discharge based on AMPA score of 15.     Rehab Resource Intensity Level, OT: II (Moderate Resource Intensity)

## 2024-02-08 NOTE — ASSESSMENT & PLAN NOTE
Patient admitted in October 2023 for sepsis due to COVID, given IV remdesivir  Patient noted to have a fever of 101 at home today and was given 2 Tylenol, patient is afebrile at this time  Patient saturating at 95% on room air   Started on IV remdesivir

## 2024-02-08 NOTE — PHYSICAL THERAPY NOTE
PHYSICAL THERAPY EVALUATION  DATE: 02/08/24  TIME: 8122-3968    NAME:  Constanza Zhu  AGE:   64 y.o.  Mrn:   [id number]  Length Of Stay: 0    ADMIT DX:  Hypokalemia [E87.6]  Altered mental status [R41.82]  Generalized weakness [R53.1]  Focal epilepsy (HCC) [G40.109]  Ambulatory dysfunction [R26.2]  AMS (altered mental status) [R41.82]  Upper respiratory tract infection due to COVID-19 virus [U07.1, J06.9]    Past Medical History:   Diagnosis Date    Depression     Diabetes mellitus (HCC)     patient denies    Dyslipidemia 03/26/2019    GERD (gastroesophageal reflux disease)     Hyperlipidemia     Hypertension     Prediabetes     Prediabetes 04/03/2019    Stroke (HCC)      Past Surgical History:   Procedure Laterality Date    ARTHROSCOPIC REPAIR ACL Left     BACK SURGERY      twice    CARPAL TUNNEL RELEASE Right     IR CEREBRAL ANGIOGRAPHY  05/04/2023    IR STROKE ALERT  03/19/2019    SHOULDER SURGERY Left     US GUIDED THYROID BIOPSY  11/21/2023       Performed at least 2 patient identifiers during session: Name, Birthday, ID bracelet, and Epic photo     02/08/24 1159   PT Last Visit   PT Visit Date 02/08/24   Note Type   Note type Evaluation   Pain Assessment   Pain Assessment Tool 0-10   Pain Score No Pain   Effect of Pain on Daily Activities n/a   Hospital Pain Intervention(s) Repositioned;Ambulation/increased activity   Multiple Pain Sites No   Restrictions/Precautions   Weight Bearing Precautions Per Order No   Other Precautions Contact/isolation;Airborne/isolation;Cognitive;Chair Alarm;Bed Alarm;Fall Risk;Aspiration;Seizure;Visual impairment  (+COVID-19, Earnest; expressive aphasia; R visual field cut)   Home Living   Type of Home House   Home Layout One level;Stairs to enter without rails  (1 SH with 1 CHRIS)   Bathroom Shower/Tub Walk-in shower   Bathroom Equipment Shower chair   Bathroom Accessibility Accessible via walker   Home Equipment Walker;Wheelchair-manual   Prior Function   Level of [place]  "Needs assistance with ADLs;Needs assistance with functional mobility;Needs assistance with IADLS   Lives With Spouse  (Faiza)   Receives Help From Family;Outpatient therapy  (pt reports attending OPPT to which spouse Faiza provides transportation)   IADLs Family/Friend/Other provides transportation;Family/Friend/Other provides meals;Family/Friend/Other provides medication management   Falls in the last 6 months 1 to 4  (\"probably 3-4\")   Vocational Retired  (retired )   Comments Pt is a questionable historian. Has baseline expressive aphasia however reports that it is currently worse than his normal. Pt lives at home with wife in a 1SH with 1STE. Pt reports ambulating household distances with RW at JG level, has wife's assistance + RW for short community mobility or will use WC.   General   Additional Pertinent History Pt is a 64 yr old male admitted under observation 2/7/24 with AMS, flu like symptoms, generalized weakness and fatigue, fever. CT head (-) for acute abnormality. Dx: COVID-19, AMS.   Family/Caregiver Present No   Cognition   Overall Cognitive Status Impaired   Arousal/Participation Cooperative   Orientation Level Oriented to person;Disoriented to place;Disoriented to time;Disoriented to situation  (pt able to recall full name but not birthdate)   Memory Decreased recall of precautions;Decreased recall of recent events;Decreased short term memory;Decreased recall of biographical information   Following Commands Follows one step commands with increased time or repetition   Subjective   Subjective \"I don't know\"   RUE Assessment   RUE Assessment WFL  (impaired R UE coordination)   LUE Assessment   LUE Assessment WFL   RLE Assessment   RLE Assessment WFL  (impaired R LE coordination)   LLE Assessment   LLE Assessment WFL   Vision-Basic Assessment   Visual History Other (Comment)  (R visual field cut from previous CVA)   Coordination   Movements are Fluid and Coordinated 0   Sensation WFL "   Light Touch   RLE Light Touch Grossly intact   LLE Light Touch Grossly intact   Proprioception   RLE Proprioception Grossly intact   LLE Proprioception Grossly Intact   Bed Mobility   Supine to Sit 3  Moderate assistance   Additional items Assist x 2;HOB elevated;Bedrails;Increased time required;Verbal cues;LE management  (trunk management)   Sit to Supine   (NT as pt was left seated OOB in recliner chair at end of session)   Additional Comments Pt with fair sitting balance at EOB. Requires B UE support on bedrails for scooting fwd at EOB for more optimal positioning prior to transfer/mobility training.   Transfers   Sit to Stand 4  Minimal assistance   Additional items Assist x 1;Bedrails;Increased time required;Verbal cues   Stand to Sit 5  Supervision   Additional items Assist x 1;Armrests;Increased time required;Verbal cues   Stand pivot 3  Moderate assistance   Additional items Assist x 1;Increased time required;Verbal cues  (RW)   Additional Comments Cues for hand placement initially however pt with good application and carry over. Pt needing cues for surface proximity prior to descent to target surface.   Ambulation/Elevation   Gait pattern Improper Weight shift;Decreased foot clearance;Wide NEREIDA;Short stride;Shuffling;Retropulsion   Gait Assistance 3  Moderate assist   Additional items Assist x 1;Verbal cues;Tactile cues   Assistive Device Rolling walker   Distance 5ft  (from EOB to recliner chair, pt with significant instability and fatigue, limiting ambulation tolerance/distance on this date)   Stair Management Assistance Not tested   Balance   Static Sitting Fair +   Dynamic Sitting Fair   Static Standing Fair -  (w/ RW support)   Dynamic Standing Poor +  (w/ RW support)   Ambulatory Poor +  (w/ RW support)   Endurance Deficit   Endurance Deficit Yes   Activity Tolerance   Activity Tolerance Patient limited by fatigue;Other (Comment)  (cognitive communication deficits)   Medical Staff Made Aware Spoke  with CM, OT   Nurse Made Aware Spoke with GARRETT Regan pre/post session   Assessment   Prognosis Good   Problem List Decreased strength;Decreased endurance;Impaired balance;Decreased mobility;Decreased coordination;Decreased cognition;Impaired judgement;Decreased safety awareness;Impaired vision   Assessment Pt seen for PT evaluation for mobility assessment & discharge needs. Activity orders: Up and OOB as tolerated. Pt admitted 2/7/2024 w/ weakness, fatigue, fever, flu like symptoms, dx COVID-19 encephalopathy. Comorbidities affecting pt's fnxl performance include: Seizures, CVA (+expressive aphasia), NPH, depression, DM, HTN, HTN induced encephalopathy, multiple back operations. During PT IE, pt requires MODA 2 person for bed mobility, NATHALIE 1 for STS transfers, and MODA 1 + RW for ambulatory transition from EOB to recliner chair (5ft). Pt displays above outlined functional impairments & limitations, and presents significantly below his baseline level of functional mobility. The AM-PAC & Barthel Index outcome tools were used to assist in determining pt safety w/ mobility/self care & appropriate d/c recommendations, see above for scores. Pt is at risk of falls d/t multiple comorbidities, h/o falls, impaired balance, impaired cognition, impaired insight/safety awareness, use of ambulatory aid, varying levels of pain , acuity of medical illness, ongoing medical treatment of primary dx, polypharmacy, and expressive aphasia . Pt's clinical presentation is currently unstable/unpredictable as seen in pt's presentation of varying levels of cognitive performance, increased fall risk, new onset of impairment of functional mobility, decreased endurance, and new onset of weakness. Pt will benefit from continued PT services in order to address impairments, decrease risk of falls, maximize independence w/ fnxl mobility, & ensure safety w/ mobility for transition to next level of care. Based on pt presentation & impairments, pt  would most appropriately benefit from Level II (moderate PT intensity) resources upon d/c.   Barriers to Discharge Decreased caregiver support   Goals   Patient Goals to maximize functional independence   STG Expiration Date 02/22/24   Short Term Goal #1 Patient PT goals established in order to maximize functional independence and decrease burden of care. Pt will: complete all bed mobility in hospital bed with S in order to promote increased OOB functional mobility and simulate home environment; complete all transfers with RW and S in order to increase safety with functional mobility; ambulate >50ft with RW and S in order to increase safety with household distance functional mobility; negotiate 1 standard height step with RW and no greater than NATHALIE in order to facilitate safe access to his home; demonstrate understanding and independence with LE strengthening HEP; improve ambulatory balance to >/= fair+ grade in order to promote safety and increased independence with mobility; improve AM-PAC score to >/= 17/24 in order to increase independence with mobility and decrease burden of care; improve Barthel Index score to >/= 40/100 in order to increase independence and decrease risk of falls.   PT Treatment Day 0   Plan   Treatment/Interventions Functional transfer training;LE strengthening/ROM;Elevations;Therapeutic exercise;Endurance training;Cognitive reorientation;Patient/family training;Equipment eval/education;Bed mobility;Gait training;Compensatory technique education;Spoke to nursing;Spoke to case management   PT Frequency 3-5x/wk   Discharge Recommendation   Rehab Resource Intensity Level, PT II (Moderate Resource Intensity)   AM-PAC Basic Mobility Inpatient   Turning in Flat Bed Without Bedrails 2   Lying on Back to Sitting on Edge of Flat Bed Without Bedrails 2   Moving Bed to Chair 2   Standing Up From Chair Using Arms 3   Walk in Room 2   Climb 3-5 Stairs With Railing 1   Basic Mobility Inpatient Raw Score  12   Basic Mobility Standardized Score 32.23   Highest Level Of Mobility   -HLM Goal 4: Move to chair/commode   -HLM Achieved 4: Move to chair/commode   Modified Aurea Scale   Modified Aurea Scale 4   Barthel Index   Feeding 5   Bathing 0   Grooming Score 0   Dressing Score 5   Bladder Score 5   Bowels Score 5   Toilet Use Score 5   Transfers (Bed/Chair) Score 5   Mobility (Level Surface) Score 0   Stairs Score 0   Barthel Index Score 30   End of Consult   Patient Position at End of Consult Bedside chair;Bed/Chair alarm activated;All needs within reach     This session, pt required and most appropriately benefited from partial or full skilled PT/OT co-eval due to cognitive-communication impairments, significant regression from baseline level of mobility, continuous vitals monitoring, decreased activity tolerance, and unpredictable medical and/or functional status. PT and OT goals were addressed separately as seen in documentation.    Based on patient's [facility] Highest Level of Mobility scores today, patient currently has a goal of -[facility] Levels: 4: MOVE TO CHAIR/COMMODE, to be completed with RN staffing each shift, in order to improve overall activity tolerance and mobility, combat hospital related deconditioning, and maximize outcomes for d/c from the acute care setting.     The patient's AM-PAC Basic Mobility Inpatient Short Form Raw Score is 12. A Raw score of less than or equal to 16 suggests the patient may benefit from discharge to post-acute rehabilitation services. Please also refer to the recommendation of the Physical Therapist for safe discharge planning.      Sunshine Travis, PT, DPT   Available via [employer]  [facility] # [id number]  [place] License - [id number]  2/8/2024

## 2024-02-08 NOTE — ASSESSMENT & PLAN NOTE
Patient with a history of generalized weakness, prior admission discharged to [facility].  Patient arrives from home.  Typically walks with a walker, however was too weak to get out of bed today  PT/OT eval and treat  Weakness likely due to COVID infection

## 2024-02-08 NOTE — PLAN OF CARE
Problem: PHYSICAL THERAPY ADULT  Goal: Performs mobility at highest level of function for planned discharge setting.  See evaluation for individualized goals.  Description: Treatment/Interventions: Functional transfer training, LE strengthening/ROM, Elevations, Therapeutic exercise, Endurance training, Cognitive reorientation, Patient/family training, Equipment eval/education, Bed mobility, Gait training, Compensatory technique education, Spoke to nursing, Spoke to case management    See flowsheet documentation for full assessment, interventions and recommendations.  Note: Prognosis: Good  Problem List: Decreased strength, Decreased endurance, Impaired balance, Decreased mobility, Decreased coordination, Decreased cognition, Impaired judgement, Decreased safety awareness, Impaired vision  Assessment: Pt seen for PT evaluation for mobility assessment & discharge needs. Activity orders: Up and OOB as tolerated. Pt admitted 2/7/2024 w/ weakness, fatigue, fever, flu like symptoms, dx COVID-19 encephalopathy. Comorbidities affecting pt's fnxl performance include: Seizures, CVA (+expressive aphasia), NPH, depression, DM, HTN, HTN induced encephalopathy, multiple back operations. During PT IE, pt requires MODA 2 person for bed mobility, NATHALIE 1 for STS transfers, and MODA 1 + RW for ambulatory transition from EOB to recliner chair (5ft). Pt displays above outlined functional impairments & limitations, and presents significantly below his baseline level of functional mobility. The AM-PAC & Barthel Index outcome tools were used to assist in determining pt safety w/ mobility/self care & appropriate d/c recommendations, see above for scores. Pt is at risk of falls d/t multiple comorbidities, h/o falls, impaired balance, impaired cognition, impaired insight/safety awareness, use of ambulatory aid, varying levels of pain , acuity of medical illness, ongoing medical treatment of primary dx, polypharmacy, and expressive aphasia .  Pt's clinical presentation is currently unstable/unpredictable as seen in pt's presentation of varying levels of cognitive performance, increased fall risk, new onset of impairment of functional mobility, decreased endurance, and new onset of weakness. Pt will benefit from continued PT services in order to address impairments, decrease risk of falls, maximize independence w/ fnxl mobility, & ensure safety w/ mobility for transition to next level of care. Based on pt presentation & impairments, pt would most appropriately benefit from Level II (moderate PT intensity) resources upon d/c.    Barriers to Discharge: Decreased caregiver support     Rehab Resource Intensity Level, PT: II (Moderate Resource Intensity)    See flowsheet documentation for full assessment.

## 2024-02-08 NOTE — ASSESSMENT & PLAN NOTE
Continue losartan and metoprolol  Patient hypertensive on admission, as needed labetalol IV for SBP greater than 180

## 2024-02-08 NOTE — ASSESSMENT & PLAN NOTE
Patient with history of NPH, hypertension induced encephalopathy, recent diagnosis of COVID was found to have increased lethargy and confusion around 3 PM this afternoon when his wife came to check on him.  Baseline GCS is typically greater than 12 given prior history of multiple strokes.  GCS at time of admission 13, patient is only oriented to self  CT head and neck:  No changes from prior imaging studies, no intracranial hemorrhage or mass, stable old infarcts  Altered mental status likely in the setting of COVID infection, mental status returned to baseline  Continue to monitor neuro status

## 2024-02-09 LAB
ALBUMIN SERPL BCP-MCNC: 3.8 G/DL (ref 3.5–5)
ALP SERPL-CCNC: 66 U/L (ref 34–104)
ALT SERPL W P-5'-P-CCNC: 16 U/L (ref 7–52)
ANION GAP SERPL CALCULATED.3IONS-SCNC: 11 MMOL/L
AST SERPL W P-5'-P-CCNC: 19 U/L (ref 13–39)
ATRIAL RATE: 197 BPM
ATRIAL RATE: 88 BPM
BASOPHILS # BLD AUTO: 0.07 THOUSANDS/ÂΜL (ref 0–0.1)
BASOPHILS NFR BLD AUTO: 1 % (ref 0–1)
BILIRUB SERPL-MCNC: 0.71 MG/DL (ref 0.2–1)
BUN SERPL-MCNC: 25 MG/DL (ref 5–25)
CALCIUM SERPL-MCNC: 9.4 MG/DL (ref 8.4–10.2)
CHLORIDE SERPL-SCNC: 101 MMOL/L (ref 96–108)
CO2 SERPL-SCNC: 23 MMOL/L (ref 21–32)
CREAT SERPL-MCNC: 0.87 MG/DL (ref 0.6–1.3)
EOSINOPHIL # BLD AUTO: 0.07 THOUSAND/ÂΜL (ref 0–0.61)
EOSINOPHIL NFR BLD AUTO: 1 % (ref 0–6)
ERYTHROCYTE [DISTWIDTH] IN BLOOD BY AUTOMATED COUNT: 13.6 % (ref 11.6–15.1)
GFR SERPL CREATININE-BSD FRML MDRD: 90 ML/MIN/1.73SQ M
GLUCOSE SERPL-MCNC: 83 MG/DL (ref 65–140)
HCT VFR BLD AUTO: 40.4 % (ref 36.5–49.3)
HGB BLD-MCNC: 13.3 G/DL (ref 12–17)
IMM GRANULOCYTES # BLD AUTO: 0.03 THOUSAND/UL (ref 0–0.2)
IMM GRANULOCYTES NFR BLD AUTO: 0 % (ref 0–2)
LYMPHOCYTES # BLD AUTO: 1.46 THOUSANDS/ÂΜL (ref 0.6–4.47)
LYMPHOCYTES NFR BLD AUTO: 15 % (ref 14–44)
MCH RBC QN AUTO: 30.8 PG (ref 26.8–34.3)
MCHC RBC AUTO-ENTMCNC: 32.9 G/DL (ref 31.4–37.4)
MCV RBC AUTO: 94 FL (ref 82–98)
MONOCYTES # BLD AUTO: 1.54 THOUSAND/ÂΜL (ref 0.17–1.22)
MONOCYTES NFR BLD AUTO: 16 % (ref 4–12)
NEUTROPHILS # BLD AUTO: 6.54 THOUSANDS/ÂΜL (ref 1.85–7.62)
NEUTS SEG NFR BLD AUTO: 67 % (ref 43–75)
NRBC BLD AUTO-RTO: 0 /100 WBCS
P AXIS: 22 DEGREES
P AXIS: 36 DEGREES
PLATELET # BLD AUTO: 189 THOUSANDS/UL (ref 149–390)
PMV BLD AUTO: 10.1 FL (ref 8.9–12.7)
POTASSIUM SERPL-SCNC: 3.4 MMOL/L (ref 3.5–5.3)
PR INTERVAL: 140 MS
PR INTERVAL: 156 MS
PROT SERPL-MCNC: 6.5 G/DL (ref 6.4–8.4)
QRS AXIS: 26 DEGREES
QRS AXIS: 43 DEGREES
QRSD INTERVAL: 100 MS
QRSD INTERVAL: 88 MS
QT INTERVAL: 328 MS
QT INTERVAL: 366 MS
QTC INTERVAL: 396 MS
QTC INTERVAL: 450 MS
RBC # BLD AUTO: 4.32 MILLION/UL (ref 3.88–5.62)
S PYO DNA THROAT QL NAA+PROBE: NOT DETECTED
SODIUM SERPL-SCNC: 135 MMOL/L (ref 135–147)
T WAVE AXIS: 20 DEGREES
T WAVE AXIS: 28 DEGREES
VENTRICULAR RATE: 88 BPM
VENTRICULAR RATE: 91 BPM
WBC # BLD AUTO: 9.71 THOUSAND/UL (ref 4.31–10.16)

## 2024-02-09 PROCEDURE — 85025 COMPLETE CBC W/AUTO DIFF WBC: CPT | Performed by: INTERNAL MEDICINE

## 2024-02-09 PROCEDURE — 87651 STREP A DNA AMP PROBE: CPT | Performed by: INTERNAL MEDICINE

## 2024-02-09 PROCEDURE — 99232 SBSQ HOSP IP/OBS MODERATE 35: CPT | Performed by: INTERNAL MEDICINE

## 2024-02-09 PROCEDURE — 80053 COMPREHEN METABOLIC PANEL: CPT | Performed by: INTERNAL MEDICINE

## 2024-02-09 PROCEDURE — 93010 ELECTROCARDIOGRAM REPORT: CPT | Performed by: INTERNAL MEDICINE

## 2024-02-09 RX ORDER — POTASSIUM CHLORIDE 20 MEQ/1
20 TABLET, EXTENDED RELEASE ORAL ONCE
Status: COMPLETED | OUTPATIENT
Start: 2024-02-09 | End: 2024-02-09

## 2024-02-09 RX ADMIN — TRAZODONE HYDROCHLORIDE 100 MG: 50 TABLET ORAL at 21:59

## 2024-02-09 RX ADMIN — POTASSIUM CHLORIDE 20 MEQ: 1500 TABLET, EXTENDED RELEASE ORAL at 09:27

## 2024-02-09 RX ADMIN — LEVETIRACETAM 500 MG: 500 TABLET, FILM COATED ORAL at 21:59

## 2024-02-09 RX ADMIN — ASPIRIN 81 MG: 81 TABLET, COATED ORAL at 09:27

## 2024-02-09 RX ADMIN — PANTOPRAZOLE SODIUM 20 MG: 20 TABLET, DELAYED RELEASE ORAL at 06:15

## 2024-02-09 RX ADMIN — METOPROLOL SUCCINATE 100 MG: 50 TABLET, EXTENDED RELEASE ORAL at 09:28

## 2024-02-09 RX ADMIN — DONEPEZIL HYDROCHLORIDE 10 MG: 5 TABLET ORAL at 09:27

## 2024-02-09 RX ADMIN — LOSARTAN POTASSIUM 50 MG: 50 TABLET, FILM COATED ORAL at 09:27

## 2024-02-09 RX ADMIN — REMDESIVIR 100 MG: 100 INJECTION, POWDER, LYOPHILIZED, FOR SOLUTION INTRAVENOUS at 06:08

## 2024-02-09 RX ADMIN — ENOXAPARIN SODIUM 40 MG: 100 INJECTION SUBCUTANEOUS at 09:28

## 2024-02-09 RX ADMIN — Medication 5 MG: at 21:59

## 2024-02-09 RX ADMIN — TICAGRELOR 90 MG: 90 TABLET ORAL at 21:59

## 2024-02-09 RX ADMIN — LEVETIRACETAM 500 MG: 500 TABLET, FILM COATED ORAL at 09:27

## 2024-02-09 RX ADMIN — TAMSULOSIN HYDROCHLORIDE 0.4 MG: 0.4 CAPSULE ORAL at 18:26

## 2024-02-09 RX ADMIN — ATORVASTATIN CALCIUM 40 MG: 40 TABLET, FILM COATED ORAL at 09:27

## 2024-02-09 RX ADMIN — TICAGRELOR 90 MG: 90 TABLET ORAL at 09:27

## 2024-02-09 NOTE — QUICK NOTE
Daughter called as a family member pt lives with tested positive for strep throat. She was requesting that he also get tested for strep. Strep culture pending

## 2024-02-09 NOTE — CASE MANAGEMENT
Case Management Assessment & Discharge Planning Note    Patient name Constanza Zhu  Location /-01 MRN [id number]  : 1960 Date 2024       Current Admission Date: 2024  Current Admission Diagnosis:AMS (altered mental status)   Patient Active Problem List    Diagnosis Date Noted    Generalized weakness 2024    COVID 2024    AMS (altered mental status) 2024    Internal carotid artery stent present 2024    Vision problem 2024    Dry eyes 2024    Focal epilepsy (HCC) 2023    Claudication of both lower extremities ([facility]) 2023    Type 2 diabetes mellitus with other diabetic ophthalmic complication ([facility]) 2023    Aphasia 2023    Atherosclerosis of native arteries of extremities with intermittent claudication, bilateral legs ([facility]) 2023    Benign prostatic hyperplasia with lower urinary tract symptoms 2023    Possible NPH (normal pressure hydrocephalus) ([facility]) 2023    Muscle spasms of both lower extremities 2023    Multiple thyroid nodules 2023    Abnormal laboratory test 05/15/2023    History of tobacco use 2023    Chronic ischemic left MCA stroke 2023    Hypokalemia 2023    Infrarenal abdominal aortic aneurysm (AAA) without rupture ([facility]) 2023    Tachycardia 2023    Prediabetes 2023    SIRS (systemic inflammatory response syndrome) ([facility]) 2023    Chronic anticoagulation 2023    Recurrent strokes ([facility]) 2023    Hyponatremia 2023    Middle cerebral artery stenosis, right 2023    Left posterior MCA stroke - etiology unclear at this time 2023    Chronic low back pain 2023    Hypertensive encephalopathy, transient 2023    Snoring 08/10/2020    Memory deficits 08/10/2020    Status post placement of implantable loop recorder 2019    History of prediabetes 2019    Anxiety and depression 2019    Insomnia 2019     Fall 03/28/2019    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) 03/28/2019    Urinary retention 03/27/2019    At risk for venous thromboembolism (VTE) 03/26/2019    Hypertriglyceridemia 03/26/2019    Nicotine dependence 03/26/2019    Carotid stenosis, bilateral 03/26/2019    Presbyopia 03/26/2019    History of stroke 03/19/2019    Headache 03/19/2019    Primary hypertension 03/19/2019    GERD (gastroesophageal reflux disease) 03/19/2019      LOS (days): 0  Geometric Mean LOS (GMLOS) (days): 5  Days to GMLOS:4.8     OBJECTIVE:    Risk of Unplanned Readmission Score: 21.82         Current admission status: Inpatient       Preferred Pharmacy:   [facility] [address] [facility] [address]  [address] [address]  Phone: [phone] Fax: [phone]    [facility] - [address] [address]  Phone: [phone] Fax: [phone]    Primary Care Provider: ERIC Calixto    Primary Insurance: MEDICARE  Secondary Insurance: BLUE CROSS    ASSESSMENT:  Active Health Care Proxies       FAIZA MYERS Health Care Representative - Spouse   Primary Phone: [phone] (Mobile)  Home Phone: [phone]                 Advance Directives  Does patient have a Health Care POA?: Yes  Does patient have Advance Directives?: Yes  Advance Directives: Living will, Power of  for health care  Primary Contact: Faiza Myers         Readmission Root Cause  30 Day Readmission: No    Patient Information  Admitted from:: Home  Mental Status: Confused  During Assessment patient was accompanied by: Not accompanied during assessment  Assessment information provided by:: Parent (past medical records)  Primary Caregiver: Self  Support Systems: Spouse/significant other, Family members  County of Residence: [place]  What city do you live in?: [place]  Home entry access options. Select all that  apply.: Stairs  Number of steps to enter home.: 1  Type of Current Residence: [address] [place] home (1st floor set up)  Upon entering residence, is there a bedroom on the main floor (no further steps)?: Yes  Upon entering residence, is there a bathroom on the main floor (no further steps)?: Yes  Living Arrangements: Lives w/ Spouse/significant other  Is patient a ?: No    Activities of Daily Living Prior to Admission  Functional Status: Independent  Completes ADLs independently?: Yes  Ambulates independently?: Yes  Does patient use assisted devices?: Yes  Assisted Devices (DME) used: Walker  Does patient currently own DME?: Yes  What DME does the patient currently own?: Walker  Does patient have a history of Outpatient Therapy (PT/OT)?: No  Does the patient have a history of Short-Term Rehab?: Yes ([facility])  Does patient have a history of HHC?: Yes  Does patient currently have HHC?: No         Patient Information Continued  Income Source: Unknown  Does patient have prescription coverage?: Yes  Does patient receive dialysis treatments?: No  Does patient have a history of substance abuse?: No  Does patient have a history of Mental Health Diagnosis?: Yes  Has patient received inpatient treatment related to mental health in the last 2 years?: No         Means of Transportation  Means of Transport to Appts:: Family transport      Housing Stability: Low Risk  (2/9/2024)    Housing Stability Vital Sign     Unable to Pay for Housing in the Last Year: No     Number of Places Lived in the Last Year: 1     Unstable Housing in the Last Year: No   Food Insecurity: No Food Insecurity (2/9/2024)    Hunger Vital Sign     Worried About Running Out of Food in the Last Year: Never true     Ran Out of Food in the Last Year: Never true   Transportation Needs: No Transportation Needs (2/9/2024)    PRAPARE - Transportation     Lack of Transportation (Medical): No     Lack of Transportation (Non-Medical): No   Utilities: Not At Risk  (2/9/2024)    [facility] Utilities     Threatened with loss of utilities: No       DISCHARGE DETAILS:    Discharge planning discussed with:: attempted x 3 with patient through intercom and left MOM for wife        CM contacted family/caregiver?: Yes (left MOM for wife)         Attempted to speak with patient via intercom as patient is +COVID. Patient confused and could not follow conversion. Placed call to his wife, Faiza at [phone] and left MOM.  From previous medical records, patient resides with his wife in a 2 story home with a 1st floor set up and 1 CHRIS. He has a SPC, RW and shower chair at home.  PT/OT recommends SNF rehab,  let wife know on VM. Patient was at [facility] in the past. Bruce referrals sent to SNf.  Wait availability and preference

## 2024-02-09 NOTE — UTILIZATION REVIEW
Initial Clinical Review    Admission: Date/Time/Statement: 2/8/24 0004 observation AND CHANGED 2/9/24 1247 DUE TO ONGOING WEAKNESS, DECREASED APPETITE WITH + COVID ON IV REMDESIVIR,  MONITOR NEURO STATUS, PT/OT WITH NEED FOR SHORT TERM REHAB.  BMP AND REPLETION OF ELECTROLYTES.   Admission Orders (From admission, onward)       Ordered        02/09/24 1247  Inpatient Admission  Once                          Orders Placed This Encounter   Procedures    Inpatient Admission     Standing Status:   Standing     Number of Occurrences:   1     Order Specific Question:   Level of Care     Answer:   Med Surg [16]     Order Specific Question:   Estimated length of stay     Answer:   More than 2 Midnights     Order Specific Question:   Certification     Answer:   I certify that inpatient services are medically necessary for this patient for a duration of greater than two midnights. See H&P and MD Progress Notes for additional information about the patient's course of treatment.     ED Arrival Information       Expected   -    Arrival   2/7/2024 18:16    Acuity   Emergent              Means of arrival   Ambulance    Escorted by   [facility]    Service   Hospitalist    Admission type   Emergency              Arrival complaint   Altered mental status             Chief Complaint   Patient presents with    Altered Mental Status     Per EMS, more altered than baseline. Typically GCS 14. Flu like symptoms started this afternoon        Initial Presentation: 64 y.o. male from home to ED 2/7/24 and observation order placed 2/8/24 CONVERTED ON 2/9 TO INPATIENT  due to acute encephalopathy/COVID/Elevated tylenol level/hypertension/weakness.  Presented due to altered mental status starting afternoon of arrival wit increased lethargy and confusion.   In the morning, cough then in the afternoon increased weakness and unable to walk.   Did take tylenol today for flu like symptoms.   On exam confused.   Hypertensive.  Wbc 10.85>10.36.     SARS-CoV-2 positive.  K 3.2.   cta head and neck no acute findings, old infarcts.  Acetaminophen 11.  In the ED given KCL.   Started on IVF.  Plan:  monitor neuro status.   PT/OT. Start IV Remdesivir.  Monitor oxygen sats.    Replete electrolytes..   check BMP in am.   Continue home medications.  as needed labetalol IV for SBP greater than 180     2/9/24 CHANGED TO INPATIENT:  febrile.   Today oriented to person and place, not time.  Some stuttering, baseline.  Appetite decreased.  On exam: awake and alert, oriented x 2.  K 3.4   monitor neuro exam.   Continue remdesivir.  Monitor oxygen sats.    Short term rehab is recommended.  PT/OT    2/10/24:   wife does not feel patient is strong enough to care for self at this time, as she works during the day.    On exam weakness.    Plan:  PT/OT.  Probable Short term rehab.   Continued remdesivir.  Monitor oxygen sats.   Dc IVF.  BMP in am.       ED Triage Vitals   Temperature Pulse Respirations Blood Pressure SpO2   02/07/24 1821 02/07/24 1821 02/07/24 1821 02/07/24 1821 02/07/24 1821   99.7 °F (37.6 °C) 88 20 (!) 148/110 96 %      Temp Source Heart Rate Source Patient Position - Orthostatic VS BP Location FiO2 (%)   02/07/24 1821 02/07/24 1900 02/07/24 1821 02/07/24 1821 --   Oral Monitor Sitting Left arm       Pain Score       02/07/24 1821       No Pain          Wt Readings from Last 1 Encounters:   12/20/23 88.5 kg (195 lb)     Additional Vital Signs:   02/10/24 07:31:35 98.4 °F (36.9 °C) 89 -- 118/65 83 96 % -- --   02/09/24 20:37:15 98.4 °F (36.9 °C) -- 14 117/67 84 -- -- --   02/09/24 15:09:16 98.5 °F (36.9 °C) -- 12 120/65 83 -- -- --   02/09/24 0941 98.6 °F (37 °C) -- -- -- --        02/09/24 01:51:39 99.5 °F (37.5 °C) 83 -- -- -- 95 % -- --   02/08/24 22:07:31 99.7 °F (37.6 °C) 93 19 135/76 96 96 % None (Room air) --   02/08/24 20:25:07 100.6 °F (38.1 °C) Abnormal  91 -- 123/68 86 94 % -- --   02/08/24 20:24:33 100.6 °F (38.1 °C) Abnormal  -- -- 123/68 86 --  -- --   02/08/24 17:02:33 -- 96 -- 137/89 105 97 % -- --   02/08/24 1300 -- -- -- -- -- -- None (Room air) --   02/08/24 1135 99.5 °F (37.5 °C) -- -- -- -- -- -- --   02/08/24 1000 -- 85 20 152/70 100 94 % None (Room air) Lying   02/08/24 0900 -- 90 29 Abnormal  159/73 107 95 % -- --   02/08/24 0800 -- 93 22 156/67 97 95 % None (Room air) Lying   02/08/24 0700 -- 97 23 Abnormal  179/79 Abnormal  114 94 % -- --   02/08/24 0600 -- 96 20 166/78 112 95 % None (Room air) --   02/08/24 0500 -- 98 21 174/79 Abnormal  113 95 % None (Room air) Sitting   02/08/24 0449 99.8 °F (37.7 °C) -- -- -- -- -- -- --   02/08/24 0400 -- 105 20 181/78 Abnormal  112 96 % None (Room air) --   02/08/24 0300 -- 106 Abnormal  20 187/85 Abnormal  -- 95 % None (Room air) Sitting   02/08/24 0200 -- 104 20 176/79 Abnormal  113 94 % -- --   02/08/24 0130 -- 108 Abnormal  18 160/72 103 95 % None (Room air) --   02/08/24 0100 -- 109 Abnormal  20 191/85 Abnormal  122 95 % -- --   02/08/24 0000 -- 107 Abnormal  18 167/77 111 96 % -- --   02/07/24 2300 -- 107 Abnormal  20 169/75 108 96 % -- --   02/07/24 2200 -- 113 Abnormal  18 147/79 108 98 % -- --   02/07/24 2100 -- 99 20 159/104 Abnormal  124 96 % -- --   02/07/24 2000 -- 95 18 143/90 109 97 % None (Room air)      Pertinent Labs/Diagnostic Test Results:   CTA head and neck with and without contrast   Final Result by Denver Chinchilla MD (02/07 3561)      1.  No acute intracranial hemorrhage. Stable senescent changes and sequelae of multifocal old infarcts in the brain parenchyma detailed above. Paranasal sinus disease.   2.  Mild calcific atherosclerosis of the aortic arch region.  Moderate (50%) luminal narrowing of the proximal right subclavian artery due to partially calcified mixed plaque.   3.  Right vertebral artery dominance.  Moderate (50 to 70%) luminal stenosis involving the distal left vertebral artery V4 segment due to calcific atherosclerosis. Findings appear similar compared to the prior  exam.   4.  Normal course and caliber of the right proximal to mid common carotid artery. Moderate (50%) luminal narrowing of the distal right common carotid artery and proximal right internal carotid artery due to irregular mixed plaque. Remainder of the right    cervical internal carotid artery appear patent with normal course and caliber.   5.  The proximal to mid left common carotid artery is widely patent with normal course and caliber. Metallic stent in the distal left common carotid artery and proximal left internal carotid artery is again seen with moderate to severe circumferential    narrowing of the proximal internal carotid artery, similar compared to the prior study. This is likely related to intimal hyperplasia. Remainder of the left cervical internal carotid artery appear grossly patent with normal course and caliber. No    occlusion or dissection.   6.  Short segment of complete occlusion involving the distal right M1 segment is again seen, similar compared to the prior study with reconstitution of more distal branches via collateral vessels. Stable appearance of slightly diminished caliber of the    distal right MCA branch vessels although they remain patent. Left middle cerebral artery and proximal branches appear grossly patent with normal course and caliber.   7.  No enhancing brain mass/fluid collection or evidence of vascular malformation. No enhancing soft tissue neck mass/fluid collection or cervical lymphadenopathy.   8.  Stable nonspecific subcentimeter hypodense thyroid nodules requiring no imaging follow-up per current guidelines.      Workstation performed: [id number]         XR chest 1 view portable   ED Interpretation by Myriam Leger MD (02/07 1934)   No acute pulmonary pathology      Final Result by Jarrell Leon MD (02/08 0837)      No acute cardiopulmonary disease.            Workstation performed: [id number]             2/7/24 ecg Sinus rhythm with Blocked Premature  atrial complexes  Nonspecific T wave abnormality  Abnormal ECG  When compared with ECG of 07-FEB-2024 18:31, (unconfirmed)  Premature atrial complexes are now Present  Sinus rhythm is no longer with 2nd degree A-V block (Mobitz II)  QT has shortened    Results from last 7 days   Lab Units 02/07/24  1824   SARS-COV-2  Positive*     Results from last 7 days   Lab Units 02/10/24  0358 02/09/24  0221 02/08/24  0615 02/07/24  1824   WBC Thousand/uL 10.76* 9.71 10.36* 10.85*   HEMOGLOBIN g/dL 13.7 13.3 13.4 13.4   HEMATOCRIT % 41.3 40.4 39.7 40.5   PLATELETS Thousands/uL 185 189 200 219   NEUTROS ABS Thousands/µL 7.29 6.54  --  8.80*     Results from last 7 days   Lab Units 02/10/24  0358 02/09/24  0221 02/08/24  0708 02/07/24  1824   SODIUM mmol/L 134* 135 136 135   POTASSIUM mmol/L 3.7 3.4* 3.5 3.2*   CHLORIDE mmol/L 103 101 101 100   CO2 mmol/L 21 23 28 26   ANION GAP mmol/L 10 11 7 9   BUN mg/dL 26* 25 16 21   CREATININE mg/dL 0.93 0.87 0.91 0.99   EGFR ml/min/1.73sq m 85 90 88 79   CALCIUM mg/dL 8.9 9.4 9.3 9.7     Results from last 7 days   Lab Units 02/10/24  0358 02/09/24  0221 02/07/24  1824   AST U/L 21 19 15   ALT U/L 15 16 17   ALK PHOS U/L 66 66 75   TOTAL PROTEIN g/dL 6.6 6.5 7.2   ALBUMIN g/dL 3.7 3.8 4.3   TOTAL BILIRUBIN mg/dL 0.63 0.71 0.54     Results from last 7 days   Lab Units 02/10/24  0358 02/09/24  0221 02/08/24  0708 02/07/24  1824   GLUCOSE RANDOM mg/dL 96 83 100 128     Results from last 7 days   Lab Units 02/07/24  1854   TSH 3RD GENERATON uIU/mL 0.890     Results from last 7 days   Lab Units 02/08/24  0708   PROCALCITONIN ng/ml 0.06     Results from last 7 days   Lab Units 02/07/24 2012   CLARITY UA  Clear   COLOR UA  Yellow   SPEC GRAV UA  1.020   PH UA  6.0   GLUCOSE UA mg/dl 70 (7/100%)*   KETONES UA mg/dl Negative   BLOOD UA  Negative   PROTEIN UA mg/dl 30 (1+)*   NITRITE UA  Negative   BILIRUBIN UA  Negative   UROBILINOGEN UA (BE) mg/dl <2.0   LEUKOCYTES UA  Negative   WBC UA /hpf 0-1    RBC UA /hpf 0-1   BACTERIA UA /hpf None Seen   EPITHELIAL CELLS WET PREP /hpf Occasional     Results from last 7 days   Lab Units 02/07/24  1824   INFLUENZA A PCR  Negative   INFLUENZA B PCR  Negative   RSV PCR  Negative     Results from last 7 days   Lab Units 02/07/24 2012   AMPH/METH  Negative   BARBITURATE UR  Negative   BENZODIAZEPINE UR  Negative   COCAINE UR  Negative   METHADONE URINE  Negative   OPIATE UR  Negative   PCP UR  Negative   THC UR  Negative     Results from last 7 days   Lab Units 02/08/24  0708 02/07/24  1854   ETHANOL LVL mg/dL  --  <10   ACETAMINOPHEN LVL ug/mL <2* 11   SALICYLATE LVL mg/dL  --  <5         ED Treatment:   Medication Administration from 02/07/2024 1816 to 02/08/2024 1147         Date/Time Order Dose Route Action Comments     02/07/2024 2008 EST potassium chloride (Klor-Con M20) CR tablet 40 mEq 40 mEq Oral Given --     02/08/2024 0817 EST aspirin (ECOTRIN LOW STRENGTH) EC tablet 81 mg 81 mg Oral Given --     02/08/2024 0705 EST atorvastatin (LIPITOR) tablet 40 mg 40 mg Oral Given --     02/08/2024 0817 EST donepezil (ARICEPT) tablet 10 mg 10 mg Oral Given --     02/08/2024 0817 EST levETIRAcetam (KEPPRA) tablet 500 mg 500 mg Oral Given --     02/08/2024 0817 EST losartan (COZAAR) tablet 50 mg 50 mg Oral Given --     02/08/2024 0817 EST metoprolol succinate (TOPROL-XL) 24 hr tablet 100 mg 100 mg Oral Given --     02/08/2024 0614 EST pantoprazole (PROTONIX) EC tablet 20 mg 20 mg Oral Given --     02/08/2024 0818 EST ticagrelor (BRILINTA) tablet 90 mg 90 mg Oral Given --     02/08/2024 0358 EST multi-electrolyte (PLASMALYTE-A/ISOLYTE-S PH 7.4) IV solution 100 mL/hr Intravenous New Bag --     02/08/2024 0818 EST enoxaparin (LOVENOX) subcutaneous injection 40 mg 40 mg Subcutaneous Given --     02/08/2024 0415 EST remdesivir (Veklury) 200 mg in sodium chloride 0.9 % 290 mL IVPB 200 mg Intravenous New Bag --          Past Medical History:   Diagnosis Date    Depression      Diabetes mellitus (HCC)     patient denies    Dyslipidemia 03/26/2019    GERD (gastroesophageal reflux disease)     Hyperlipidemia     Hypertension     Prediabetes     Prediabetes 04/03/2019    Stroke (HCC)      Present on Admission:   Primary hypertension   GERD (gastroesophageal reflux disease)   History of prediabetes   Focal epilepsy (HCC)   Generalized weakness   COVID   AMS (altered mental status)   Hypokalemia      Admitting Diagnosis: Hypokalemia [E87.6]  Altered mental status [R41.82]  Generalized weakness [R53.1]  Focal epilepsy (HCC) [G40.109]  Ambulatory dysfunction [R26.2]  AMS (altered mental status) [R41.82]  Upper respiratory tract infection due to COVID-19 virus [U07.1, J06.9]  Age/Sex: 64 y.o. male  Admission Orders:  Scheduled Medications:  aspirin, 81 mg, Oral, Daily  atorvastatin, 40 mg, Oral, Daily With Breakfast  donepezil, 10 mg, Oral, Daily  enoxaparin, 40 mg, Subcutaneous, Daily  levETIRAcetam, 500 mg, Oral, Q12H JOSE  losartan, 50 mg, Oral, Daily  melatonin, 5 mg, Oral, HS  metoprolol succinate, 100 mg, Oral, Daily  pantoprazole, 20 mg, Oral, Early Morning  remdesivir, 100 mg, Intravenous, Q24H  tamsulosin, 0.4 mg, Oral, Daily With Dinner  ticagrelor, 90 mg, Oral, Q12H JOSE  traZODone, 100 mg, Oral, HS    Continuous IV Infusions:  multi-electrolyte (PLASMALYTE-A/ISOLYTE-S PH 7.4) IV solution  Rate: 100 mL/hr Dose: 100 mL/hr  Freq: Continuous Route: IV  Indications of Use: IV Hydration  Last Dose: Stopped (02/08/24 1922)  Start: 02/08/24 0300 End: 02/08/24 1157      PRN Meds: not used.   baclofen, 5 mg, Oral, BID PRN    PT/OT    None    Network Utilization Review Department  ATTENTION: Please call with any questions or concerns to [phone] and carefully listen to the prompts so that you are directed to the right person. All voicemails are confidential.   For Discharge needs, contact Care Management DC Support Team at [phone] opt. [phone]  Send all requests for admission clinical reviews,  approved or denied determinations and any other requests to dedicated fax number below belonging to the campus where the patient is receiving treatment. List of dedicated fax numbers for the Facilities:  FACILITY NAME UR FAX NUMBER   ADMISSION DENIALS (Administrative/Medical Necessity) [phone]   DISCHARGE SUPPORT TEAM (NETWORK) [phone]   PARENT CHILD HEALTH (Maternity/NICU/Pediatrics) [phone]   [facility] [phone]   [facility] [phone]   [facility] [phone]   General acute hospital [phone]   [facility] [phone]   [facility] [phone]   [facility] [phone]   [facility] [phone]   [facility] [phone]   [facility] [phone]   [facility] [phone]   [facility] [phone]

## 2024-02-09 NOTE — PLAN OF CARE
Problem: PAIN - ADULT  Goal: Verbalizes/displays adequate comfort level or baseline comfort level  Description: Interventions:  - Encourage patient to monitor pain and request assistance  - Assess pain using appropriate pain scale  - Administer analgesics based on type and severity of pain and evaluate response  - Implement non-pharmacological measures as appropriate and evaluate response  - Consider cultural and social influences on pain and pain management  - Notify physician/advanced practitioner if interventions unsuccessful or patient reports new pain  Outcome: Progressing     Problem: INFECTION - ADULT  Goal: Absence or prevention of progression during hospitalization  Description: INTERVENTIONS:  - Assess and monitor for signs and symptoms of infection  - Monitor lab/diagnostic results  - Monitor all insertion sites, i.e. indwelling lines, tubes, and drains  - Monitor endotracheal if appropriate and nasal secretions for changes in amount and color  - Elida appropriate cooling/warming therapies per order  - Administer medications as ordered  - Instruct and encourage patient and family to use good hand hygiene technique  - Identify and instruct in appropriate isolation precautions for identified infection/condition  Outcome: Progressing     Problem: CARDIOVASCULAR - ADULT  Goal: Maintains optimal cardiac output and hemodynamic stability  Description: INTERVENTIONS:  - Monitor I/O, vital signs and rhythm  - Monitor for S/S and trends of decreased cardiac output  - Administer and titrate ordered vasoactive medications to optimize hemodynamic stability  - Assess quality of pulses, skin color and temperature  - Assess for signs of decreased coronary artery perfusion  - Instruct patient to report change in severity of symptoms  Outcome: Progressing     Problem: CARDIOVASCULAR - ADULT  Goal: Absence of cardiac dysrhythmias or at baseline rhythm  Description: INTERVENTIONS:  - Continuous cardiac monitoring,  vital signs, obtain 12 lead EKG if ordered  - Administer antiarrhythmic and heart rate control medications as ordered  - Monitor electrolytes and administer replacement therapy as ordered  Outcome: Progressing     Problem: RESPIRATORY - ADULT  Goal: Achieves optimal ventilation and oxygenation  Description: INTERVENTIONS:  - Assess for changes in respiratory status  - Assess for changes in mentation and behavior  - Position to facilitate oxygenation and minimize respiratory effort  - Oxygen administered by appropriate delivery if ordered  - Initiate smoking cessation education as indicated  - Encourage broncho-pulmonary hygiene including cough, deep breathe, Incentive Spirometry  - Assess the need for suctioning and aspirate as needed  - Assess and instruct to report SOB or any respiratory difficulty  - Respiratory Therapy support as indicated  Outcome: Progressing

## 2024-02-09 NOTE — PLAN OF CARE
Problem: PAIN - ADULT  Goal: Verbalizes/displays adequate comfort level or baseline comfort level  Description: Interventions:  - Encourage patient to monitor pain and request assistance  - Assess pain using appropriate pain scale  - Administer analgesics based on type and severity of pain and evaluate response  - Implement non-pharmacological measures as appropriate and evaluate response  - Consider cultural and social influences on pain and pain management  - Notify physician/advanced practitioner if interventions unsuccessful or patient reports new pain  Outcome: Progressing     Problem: INFECTION - ADULT  Goal: Absence or prevention of progression during hospitalization  Description: INTERVENTIONS:  - Assess and monitor for signs and symptoms of infection  - Monitor lab/diagnostic results  - Monitor all insertion sites, i.e. indwelling lines, tubes, and drains  - Monitor endotracheal if appropriate and nasal secretions for changes in amount and color  - McDonald appropriate cooling/warming therapies per order  - Administer medications as ordered  - Instruct and encourage patient and family to use good hand hygiene technique  - Identify and instruct in appropriate isolation precautions for identified infection/condition  Outcome: Progressing     Problem: SAFETY ADULT  Goal: Patient will remain free of falls  Description: INTERVENTIONS:  - Educate patient/family on patient safety including physical limitations  - Instruct patient to call for assistance with activity   - Consult OT/PT to assist with strengthening/mobility   - Keep Call bell within reach  - Keep bed low and locked with side rails adjusted as appropriate  - Keep care items and personal belongings within reach  - Initiate and maintain comfort rounds  - Make Fall Risk Sign visible to staff  - Offer Toileting every 2 Hours, in advance of need  - Initiate/Maintain bed alarm  - Obtain necessary fall risk management equipment: socks   - Apply yellow  socks and bracelet for high fall risk patients  - Consider moving patient to room near nurses station  Outcome: Progressing  Goal: Maintain or return to baseline ADL function  Description: INTERVENTIONS:  -  Assess patient's ability to carry out ADLs; assess patient's baseline for ADL function and identify physical deficits which impact ability to perform ADLs (bathing, care of mouth/teeth, toileting, grooming, dressing, etc.)  - Assess/evaluate cause of self-care deficits   - Assess range of motion  - Assess patient's mobility; develop plan if impaired  - Assess patient's need for assistive devices and provide as appropriate  - Encourage maximum independence but intervene and supervise when necessary  - Involve family in performance of ADLs  - Assess for home care needs following discharge   - Consider OT consult to assist with ADL evaluation and planning for discharge  - Provide patient education as appropriate  Outcome: Progressing  Goal: Maintains/Returns to pre admission functional level  Description: INTERVENTIONS:  - Perform AM-PAC 6 Click Basic Mobility/ Daily Activity assessment daily.  - Set and communicate daily mobility goal to care team and patient/family/caregiver.   - Collaborate with rehabilitation services on mobility goals if consulted  - Perform Range of Motion 3 times a day.  - Reposition patient every 2 hours.  - Dangle patient 3 times a day  - Stand patient 3 times a day  - Ambulate patient 3 times a day  - Out of bed to chair 3 times a day   - Out of bed for meals 3 times a day  - Out of bed for toileting  - Record patient progress and toleration of activity level   Outcome: Progressing     Problem: DISCHARGE PLANNING  Goal: Discharge to home or other facility with appropriate resources  Description: INTERVENTIONS:  - Identify barriers to discharge w/patient and caregiver  - Arrange for needed discharge resources and transportation as appropriate  - Identify discharge learning needs (meds,  wound care, etc.)  - Arrange for interpretive services to assist at discharge as needed  - Refer to Case Management Department for coordinating discharge planning if the patient needs post-hospital services based on physician/advanced practitioner order or complex needs related to functional status, cognitive ability, or social support system  Outcome: Progressing     Problem: Knowledge Deficit  Goal: Patient/family/caregiver demonstrates understanding of disease process, treatment plan, medications, and discharge instructions  Description: Complete learning assessment and assess knowledge base.  Interventions:  - Provide teaching at level of understanding  - Provide teaching via preferred learning methods  Outcome: Progressing     Problem: Nutrition/Hydration-ADULT  Goal: Nutrient/Hydration intake appropriate for improving, restoring or maintaining nutritional needs  Description: Monitor and assess patient's nutrition/hydration status for malnutrition. Collaborate with interdisciplinary team and initiate plan and interventions as ordered.  Monitor patient's weight and dietary intake as ordered or per policy. Utilize nutrition screening tool and intervene as necessary. Determine patient's food preferences and provide high-protein, high-caloric foods as appropriate.     INTERVENTIONS:  - Monitor oral intake, urinary output, labs, and treatment plans  - Assess nutrition and hydration status and recommend course of action  - Evaluate amount of meals eaten  - Assist patient with eating if necessary   - Allow adequate time for meals  - Recommend/ encourage appropriate diets, oral nutritional supplements, and vitamin/mineral supplements  - Order, calculate, and assess calorie counts as needed  - Recommend, monitor, and adjust tube feedings and TPN/PPN based on assessed needs  - Assess need for intravenous fluids  - Provide specific nutrition/hydration education as appropriate  - Include patient/family/caregiver in  decisions related to nutrition  Outcome: Progressing     Problem: MOBILITY - ADULT  Goal: Maintain or return to baseline ADL function  Description: INTERVENTIONS:  -  Assess patient's ability to carry out ADLs; assess patient's baseline for ADL function and identify physical deficits which impact ability to perform ADLs (bathing, care of mouth/teeth, toileting, grooming, dressing, etc.)  - Assess/evaluate cause of self-care deficits   - Assess range of motion  - Assess patient's mobility; develop plan if impaired  - Assess patient's need for assistive devices and provide as appropriate  - Encourage maximum independence but intervene and supervise when necessary  - Involve family in performance of ADLs  - Assess for home care needs following discharge   - Consider OT consult to assist with ADL evaluation and planning for discharge  - Provide patient education as appropriate  Outcome: Progressing  Goal: Maintains/Returns to pre admission functional level  Description: INTERVENTIONS:  - Perform AM-PAC 6 Click Basic Mobility/ Daily Activity assessment daily.  - Set and communicate daily mobility goal to care team and patient/family/caregiver.   - Collaborate with rehabilitation services on mobility goals if consulted  - Perform Range of Motion 3 times a day.  - Reposition patient every 2 hours.  - Dangle patient 3 times a day  - Stand patient 3 times a day  - Ambulate patient 3 times a day  - Out of bed to chair 3 times a day   - Out of bed for meals 3 times a day  - Out of bed for toileting  - Record patient progress and toleration of activity level   Outcome: Progressing     Problem: NEUROSENSORY - ADULT  Goal: Achieves stable or improved neurological status  Description: INTERVENTIONS  - Monitor and report changes in neurological status  - Monitor vital signs such as temperature, blood pressure, glucose, and any other labs ordered   - Initiate measures to prevent increased intracranial pressure  - Monitor for seizure  activity and implement precautions if appropriate      Outcome: Progressing  Goal: Remains free of injury related to seizures activity  Description: INTERVENTIONS  - Maintain airway, patient safety  and administer oxygen as ordered  - Monitor patient for seizure activity, document and report duration and description of seizure to physician/advanced practitioner  - If seizure occurs,  ensure patient safety during seizure  - Reorient patient post seizure  - Seizure pads on all 4 side rails  - Instruct patient/family to notify RN of any seizure activity including if an aura is experienced  - Instruct patient/family to call for assistance with activity based on nursing assessment  - Administer anti-seizure medications if ordered    Outcome: Progressing  Goal: Achieves maximal functionality and self care  Description: INTERVENTIONS  - Monitor swallowing and airway patency with patient fatigue and changes in neurological status  - Encourage and assist patient to increase activity and self care.   - Encourage visually impaired, hearing impaired and aphasic patients to use assistive/communication devices  Outcome: Progressing     Problem: CARDIOVASCULAR - ADULT  Goal: Maintains optimal cardiac output and hemodynamic stability  Description: INTERVENTIONS:  - Monitor I/O, vital signs and rhythm  - Monitor for S/S and trends of decreased cardiac output  - Administer and titrate ordered vasoactive medications to optimize hemodynamic stability  - Assess quality of pulses, skin color and temperature  - Assess for signs of decreased coronary artery perfusion  - Instruct patient to report change in severity of symptoms  Outcome: Progressing  Goal: Absence of cardiac dysrhythmias or at baseline rhythm  Description: INTERVENTIONS:  - Continuous cardiac monitoring, vital signs, obtain 12 lead EKG if ordered  - Administer antiarrhythmic and heart rate control medications as ordered  - Monitor electrolytes and administer replacement  therapy as ordered  Outcome: Progressing     Problem: RESPIRATORY - ADULT  Goal: Achieves optimal ventilation and oxygenation  Description: INTERVENTIONS:  - Assess for changes in respiratory status  - Assess for changes in mentation and behavior  - Position to facilitate oxygenation and minimize respiratory effort  - Oxygen administered by appropriate delivery if ordered  - Initiate smoking cessation education as indicated  - Encourage broncho-pulmonary hygiene including cough, deep breathe, Incentive Spirometry  - Assess the need for suctioning and aspirate as needed  - Assess and instruct to report SOB or any respiratory difficulty  - Respiratory Therapy support as indicated  Outcome: Progressing     Problem: METABOLIC, FLUID AND ELECTROLYTES - ADULT  Goal: Electrolytes maintained within normal limits  Description: INTERVENTIONS:  - Monitor labs and assess patient for signs and symptoms of electrolyte imbalances  - Administer electrolyte replacement as ordered  - Monitor response to electrolyte replacements, including repeat lab results as appropriate  - Instruct patient on fluid and nutrition as appropriate  Outcome: Progressing  Goal: Fluid balance maintained  Description: INTERVENTIONS:  - Monitor labs   - Monitor I/O and WT  - Instruct patient on fluid and nutrition as appropriate  - Assess for signs & symptoms of volume excess or deficit  Outcome: Progressing  Goal: Glucose maintained within target range  Description: INTERVENTIONS:  - Monitor Blood Glucose as ordered  - Assess for signs and symptoms of hyperglycemia and hypoglycemia  - Administer ordered medications to maintain glucose within target range  - Assess nutritional intake and initiate nutrition service referral as needed  Outcome: Progressing     Problem: SKIN/TISSUE INTEGRITY - ADULT  Goal: Skin Integrity remains intact(Skin Breakdown Prevention)  Description: Assess:  -Perform Arnaud assessment every shift  -Clean and moisturize skin every  day  -Inspect skin when repositioning, toileting, and assisting with ADLS  -Assess extremities for adequate circulation and sensation     Bed Management:  -Have minimal linens on bed & keep smooth, unwrinkled  -Change linens as needed when moist or perspiring  -Avoid sitting or lying in one position for more than 2 hours while in bed  -Keep HOB at 30 degrees     Toileting:  -Offer bedside commode  -Assess for incontinence every shift  -Use incontinent care products after each incontinent episode such as faom    Activity:  -Mobilize patient 3 times a day  -Encourage activity and walks on unit  -Encourage or provide ROM exercises   -Turn and reposition patient every 8 Hours  -Use appropriate equipment to lift or move patient in bed  -Instruct/ Assist with weight shifting every 2 when out of bed in chair  -Consider limitation of chair time 120 hour intervals    Skin Care:  -Avoid use of baby powder, tape, friction and shearing, hot water or constrictive clothing  -Relieve pressure over bony prominences using wedges  -Do not massage red bony areas    Next Steps:  -Teach patient strategies to minimize risks such as fall   -Consider consults to  interdisciplinary teams such as PT/OT  Outcome: Progressing  Goal: Incision(s), wounds(s) or drain site(s) healing without S/S of infection  Description: INTERVENTIONS  - Assess and document dressing, incision, wound bed, drain sites and surrounding tissue  - Provide patient and family education  - Perform skin care/dressing changes as ordered  Outcome: Progressing  Goal: Pressure injury heals and does not worsen  Description: Interventions:  - Implement low air loss mattress or specialty surface (Criteria met)  - Apply silicone foam dressing  - Instruct/assist with weight shifting every 90  minutes when in chair   - Limit chair time to 3 hour intervals  - Use special pressure reducing interventions such as weight shifting when in chair   - Apply fecal or urinary incontinence  containment device   - Perform passive or active ROM every 4 hours  - Turn and reposition patient & offload bony prominences every 2 hours   - Utilize friction reducing device or surface for transfers   - Consider consults to  interdisciplinary teams such as wound care  - Use incontinent care products after each incontinent episode such as barrier cream  - Consider nutrition services referral as needed  Outcome: Progressing     Problem: MUSCULOSKELETAL - ADULT  Goal: Maintain or return mobility to safest level of function  Description: INTERVENTIONS:  - Assess patient's ability to carry out ADLs; assess patient's baseline for ADL function and identify physical deficits which impact ability to perform ADLs (bathing, care of mouth/teeth, toileting, grooming, dressing, etc.)  - Assess/evaluate cause of self-care deficits   - Assess range of motion  - Assess patient's mobility  - Assess patient's need for assistive devices and provide as appropriate  - Encourage maximum independence but intervene and supervise when necessary  - Involve family in performance of ADLs  - Assess for home care needs following discharge   - Consider OT consult to assist with ADL evaluation and planning for discharge  - Provide patient education as appropriate  Outcome: Progressing  Goal: Maintain proper alignment of affected body part  Description: INTERVENTIONS:  - Support, maintain and protect limb and body alignment  - Provide patient/ family with appropriate education  Outcome: Progressing     Problem: Prexisting or High Potential for Compromised Skin Integrity  Goal: Skin integrity is maintained or improved  Description: INTERVENTIONS:  - Identify patients at risk for skin breakdown  - Assess and monitor skin integrity  - Assess and monitor nutrition and hydration status  - Monitor labs   - Assess for incontinence   - Turn and reposition patient  - Assist with mobility/ambulation  - Relieve pressure over bony prominences  - Avoid  friction and shearing  - Provide appropriate hygiene as needed including keeping skin clean and dry  - Evaluate need for skin moisturizer/barrier cream  - Collaborate with interdisciplinary team   - Patient/family teaching  - Consider wound care consult   Outcome: Progressing

## 2024-02-09 NOTE — ASSESSMENT & PLAN NOTE
Patient with a history of generalized weakness, prior admission discharged to [facility].  Patient arrives from home.  Typically walks with a walker, however was too weak to get out of bed today  PT/OT- recommended str   Weakness likely due to COVID infection

## 2024-02-09 NOTE — PROGRESS NOTES
[facility]  Progress Note  Name: Constanza Zhu I  MRN: [id number]  Unit/Bed#: -01 I Date of Admission: 2/7/2024   Date of Service: 2/9/2024 I Hospital Day: 0    Assessment/Plan   * AMS (altered mental status)  Assessment & Plan  Patient with history of NPH, hypertension induced encephalopathy, recent diagnosis of COVID was found to have increased lethargy and confusion around 3 PM this afternoon when his wife came to check on him.  Baseline GCS is typically greater than 12 given prior history of multiple strokes.  GCS at time of admission 13, patient is only oriented to self  CT head and neck:  No changes from prior imaging studies, no intracranial hemorrhage or mass, stable old infarcts  Altered mental status likely in the setting of COVID infection, mental status returned to baseline  Continue to monitor neuro status    COVID  Assessment & Plan  Patient admitted in October 2023 for sepsis due to COVID, given IV remdesivir  Patient noted to have a fever of 101 at home today and was given 2 Tylenol,  Recurrent fever last evening of 100.6  Patient saturating at 95% on room air   continue IV remdesivir day 2    Generalized weakness  Assessment & Plan  Patient with a history of generalized weakness, prior admission discharged to [facility].  Patient arrives from home.  Typically walks with a walker, however was too weak to get out of bed today  PT/OT- recommended str   Weakness likely due to COVID infection    Focal epilepsy (HCC)  Assessment & Plan  Patient with history of staring episodes, felt to be absence seizure's  Follows with neurology as outpatient  continue Keppra 500 Mg    Hypokalemia  Assessment & Plan  Patient with potassium of 3.2 on admission  Received 40 mill equivalents potassium in the ED  Continue isolate IVF  Hold home HTZ 12.5mg  Recheck BMP in the a.m.    History of prediabetes  Assessment & Plan  Continue carb controlled diet and monitor sugars with  BMP    GERD (gastroesophageal reflux disease)  Assessment & Plan  Continue Protonix 20 mg    Primary hypertension  Assessment & Plan  Continue losartan and metoprolol  Patient hypertensive on admission, as needed labetalol IV for SBP greater than 180    History of stroke  Assessment & Plan  Patient with history of bilateral MCA strokes, with right stenosis and left stenosis with left ICA stent placement in May 2023  Baseline GCS 12, per EMS  GCS at time of admission 13, eye-opening to sound, confused, obeying commands  Follows with neurology outpatient  Per last neurology note BP goal of <130/80  Continue aspirin, Plavix, statin               VTE Pharmacologic Prophylaxis: VTE Score: 8 lovenox    Mobility:   Basic Mobility Inpatient Raw Score: 13  -[facility] Goal: 4: Move to chair/commode  -[facility] Achieved: 5: Stand (1 or more minutes)      Patient Centered Rounds:  will discuss with his nurse       Education and Discussions with Family / Patient: updated wife via phone     Total Time Spent on Date of Encounter in care of patient: 40 mins. This time was spent on one or more of the following: performing physical exam; counseling and coordination of care; obtaining or reviewing history; documenting in the medical record; reviewing/ordering tests, medications or procedures; communicating with other healthcare professionals and discussing with patient's family/caregivers.    Current Length of Stay: 0 day(s)  Current Patient Status: Inpatient     Discharge Plan: Anticipate discharge in 24-48 hrs to rehab facility.    Code Status: Level 1 - Full Code    Subjective:   Pt seen and examined. He was oriented to person, place. Not time but that is his baseline. He did stutter a bit during exam but discussed with his wife who verified he does stutter at time at baseline. No f/c no cp no sob no n/v/d. There is concern about decreased appetite. His wife will be bringing food from home     Objective:     Vitals:   Temp (24hrs),  Av.8 °F (37.7 °C), Min:98.6 °F (37 °C), Max:100.6 °F (38.1 °C)    Temp:  [98.6 °F (37 °C)-100.6 °F (38.1 °C)] 98.6 °F (37 °C)  HR:  [82-96] 82  Resp:  [19-20] 20  BP: (121-137)/(66-89) 121/66  SpO2:  [94 %-97 %] 94 %  There is no height or weight on file to calculate BMI.     Input and Output Summary (last 24 hours):     Intake/Output Summary (Last 24 hours) at 2024 1257  Last data filed at 2024 2207  Gross per 24 hour   Intake 360 ml   Output --   Net 360 ml       Physical Exam:   Physical Exam  Constitutional:       Appearance: Normal appearance.   HENT:      Head: Normocephalic and atraumatic.   Eyes:      Extraocular Movements: Extraocular movements intact.      Pupils: Pupils are equal, round, and reactive to light.   Cardiovascular:      Rate and Rhythm: Normal rate and regular rhythm.      Heart sounds: No murmur heard.     No friction rub. No gallop.   Pulmonary:      Effort: Pulmonary effort is normal. No respiratory distress.      Breath sounds: Normal breath sounds. No wheezing, rhonchi or rales.   Abdominal:      General: Bowel sounds are normal. There is no distension.      Palpations: Abdomen is soft.      Tenderness: There is no abdominal tenderness. There is no guarding or rebound.   Musculoskeletal:      Right lower leg: No edema.      Left lower leg: No edema.   Neurological:      Mental Status: He is alert.      Comments: Awake alert oriented x2 person, place           Additional Data:     Labs:  Results from last 7 days   Lab Units 24  0221   WBC Thousand/uL 9.71   HEMOGLOBIN g/dL 13.3   HEMATOCRIT % 40.4   PLATELETS Thousands/uL 189   NEUTROS PCT % 67   LYMPHS PCT % 15   MONOS PCT % 16*   EOS PCT % 1     Results from last 7 days   Lab Units 24  0221   SODIUM mmol/L 135   POTASSIUM mmol/L 3.4*   CHLORIDE mmol/L 101   CO2 mmol/L 23   BUN mg/dL 25   CREATININE mg/dL 0.87   ANION GAP mmol/L 11   CALCIUM mg/dL 9.4   ALBUMIN g/dL 3.8   TOTAL BILIRUBIN mg/dL 0.71   ALK PHOS U/L  66   ALT U/L 16   AST U/L 19   GLUCOSE RANDOM mg/dL 83                 Results from last 7 days   Lab Units 02/08/24  0708   PROCALCITONIN ng/ml 0.06       Lines/Drains:  Invasive Devices       Peripheral Intravenous Line  Duration             Peripheral IV 02/09/24 Right;Ventral (anterior) Forearm <1 day              Drain  Duration             External Urinary Catheter Small <1 day                          Imaging: No pertinent imaging reviewed.    Recent Cultures (last 7 days):         Last 24 Hours Medication List:   Current Facility-Administered Medications   Medication Dose Route Frequency Provider Last Rate    aspirin  81 mg Oral Daily Margarita Torres PA-C      atorvastatin  40 mg Oral Daily With Breakfast Margarita Torres PA-C      baclofen  5 mg Oral BID PRN Margarita Torres PA-C      donepezil  10 mg Oral Daily Margarita Torres PA-C      enoxaparin  40 mg Subcutaneous Daily Margarita Torres PA-C      levETIRAcetam  500 mg Oral Q12H JOSE Margarita Torres PA-C      losartan  50 mg Oral Daily Margarita Torres PA-C      melatonin  5 mg Oral HS Margarita Torres PA-C      metoprolol succinate  100 mg Oral Daily Margarita Torres PA-C      pantoprazole  20 mg Oral Early Morning Margarita Torres PA-C      remdesivir  100 mg Intravenous Q24H Margartia Torres PA-C 100 mg (02/09/24 0608)    tamsulosin  0.4 mg Oral Daily With Dinner Margarita Torres PA-C      ticagrelor  90 mg Oral Q12H JOSE Margarita Torres PA-C      traZODone  100 mg Oral HS Margarita Torres PA-C          Today, Patient Was Seen By: Latrice Byrd DO  \

## 2024-02-09 NOTE — ASSESSMENT & PLAN NOTE
Patient admitted in October 2023 for sepsis due to COVID, given IV remdesivir  Patient noted to have a fever of 101 at home today and was given 2 Tylenol,  Recurrent fever last evening of 100.6  Patient saturating at 95% on room air   continue IV remdesivir day 2

## 2024-02-10 LAB
ALBUMIN SERPL BCP-MCNC: 3.7 G/DL (ref 3.5–5)
ALP SERPL-CCNC: 66 U/L (ref 34–104)
ALT SERPL W P-5'-P-CCNC: 15 U/L (ref 7–52)
ANION GAP SERPL CALCULATED.3IONS-SCNC: 10 MMOL/L
AST SERPL W P-5'-P-CCNC: 21 U/L (ref 13–39)
BASOPHILS # BLD AUTO: 0.05 THOUSANDS/ÂΜL (ref 0–0.1)
BASOPHILS NFR BLD AUTO: 1 % (ref 0–1)
BILIRUB SERPL-MCNC: 0.63 MG/DL (ref 0.2–1)
BUN SERPL-MCNC: 26 MG/DL (ref 5–25)
CALCIUM SERPL-MCNC: 8.9 MG/DL (ref 8.4–10.2)
CHLORIDE SERPL-SCNC: 103 MMOL/L (ref 96–108)
CO2 SERPL-SCNC: 21 MMOL/L (ref 21–32)
CREAT SERPL-MCNC: 0.93 MG/DL (ref 0.6–1.3)
EOSINOPHIL # BLD AUTO: 0.23 THOUSAND/ÂΜL (ref 0–0.61)
EOSINOPHIL NFR BLD AUTO: 2 % (ref 0–6)
ERYTHROCYTE [DISTWIDTH] IN BLOOD BY AUTOMATED COUNT: 13.7 % (ref 11.6–15.1)
GFR SERPL CREATININE-BSD FRML MDRD: 85 ML/MIN/1.73SQ M
GLUCOSE SERPL-MCNC: 96 MG/DL (ref 65–140)
HCT VFR BLD AUTO: 41.3 % (ref 36.5–49.3)
HGB BLD-MCNC: 13.7 G/DL (ref 12–17)
IMM GRANULOCYTES # BLD AUTO: 0.06 THOUSAND/UL (ref 0–0.2)
IMM GRANULOCYTES NFR BLD AUTO: 1 % (ref 0–2)
LYMPHOCYTES # BLD AUTO: 1.77 THOUSANDS/ÂΜL (ref 0.6–4.47)
LYMPHOCYTES NFR BLD AUTO: 16 % (ref 14–44)
MCH RBC QN AUTO: 31.2 PG (ref 26.8–34.3)
MCHC RBC AUTO-ENTMCNC: 33.2 G/DL (ref 31.4–37.4)
MCV RBC AUTO: 94 FL (ref 82–98)
MONOCYTES # BLD AUTO: 1.36 THOUSAND/ÂΜL (ref 0.17–1.22)
MONOCYTES NFR BLD AUTO: 13 % (ref 4–12)
NEUTROPHILS # BLD AUTO: 7.29 THOUSANDS/ÂΜL (ref 1.85–7.62)
NEUTS SEG NFR BLD AUTO: 67 % (ref 43–75)
NRBC BLD AUTO-RTO: 0 /100 WBCS
PLATELET # BLD AUTO: 185 THOUSANDS/UL (ref 149–390)
PMV BLD AUTO: 10.9 FL (ref 8.9–12.7)
POTASSIUM SERPL-SCNC: 3.7 MMOL/L (ref 3.5–5.3)
PROT SERPL-MCNC: 6.6 G/DL (ref 6.4–8.4)
RBC # BLD AUTO: 4.39 MILLION/UL (ref 3.88–5.62)
SODIUM SERPL-SCNC: 134 MMOL/L (ref 135–147)
WBC # BLD AUTO: 10.76 THOUSAND/UL (ref 4.31–10.16)

## 2024-02-10 PROCEDURE — 85025 COMPLETE CBC W/AUTO DIFF WBC: CPT | Performed by: INTERNAL MEDICINE

## 2024-02-10 PROCEDURE — 99232 SBSQ HOSP IP/OBS MODERATE 35: CPT | Performed by: INTERNAL MEDICINE

## 2024-02-10 PROCEDURE — 80053 COMPREHEN METABOLIC PANEL: CPT | Performed by: INTERNAL MEDICINE

## 2024-02-10 RX ADMIN — LOSARTAN POTASSIUM 50 MG: 50 TABLET, FILM COATED ORAL at 07:40

## 2024-02-10 RX ADMIN — TICAGRELOR 90 MG: 90 TABLET ORAL at 22:24

## 2024-02-10 RX ADMIN — Medication 5 MG: at 22:24

## 2024-02-10 RX ADMIN — TICAGRELOR 90 MG: 90 TABLET ORAL at 07:40

## 2024-02-10 RX ADMIN — LEVETIRACETAM 500 MG: 500 TABLET, FILM COATED ORAL at 07:40

## 2024-02-10 RX ADMIN — ENOXAPARIN SODIUM 40 MG: 100 INJECTION SUBCUTANEOUS at 07:40

## 2024-02-10 RX ADMIN — ATORVASTATIN CALCIUM 40 MG: 40 TABLET, FILM COATED ORAL at 07:31

## 2024-02-10 RX ADMIN — METOPROLOL SUCCINATE 100 MG: 50 TABLET, EXTENDED RELEASE ORAL at 07:40

## 2024-02-10 RX ADMIN — PANTOPRAZOLE SODIUM 20 MG: 20 TABLET, DELAYED RELEASE ORAL at 06:32

## 2024-02-10 RX ADMIN — REMDESIVIR 100 MG: 100 INJECTION, POWDER, LYOPHILIZED, FOR SOLUTION INTRAVENOUS at 06:32

## 2024-02-10 RX ADMIN — TAMSULOSIN HYDROCHLORIDE 0.4 MG: 0.4 CAPSULE ORAL at 17:56

## 2024-02-10 RX ADMIN — TRAZODONE HYDROCHLORIDE 100 MG: 50 TABLET ORAL at 22:24

## 2024-02-10 RX ADMIN — GLYCERIN 1 DROP: .002; .002; .01 SOLUTION/ DROPS OPHTHALMIC at 17:56

## 2024-02-10 RX ADMIN — DONEPEZIL HYDROCHLORIDE 10 MG: 5 TABLET ORAL at 07:40

## 2024-02-10 RX ADMIN — ASPIRIN 81 MG: 81 TABLET, COATED ORAL at 07:40

## 2024-02-10 RX ADMIN — LEVETIRACETAM 500 MG: 500 TABLET, FILM COATED ORAL at 22:25

## 2024-02-10 NOTE — ASSESSMENT & PLAN NOTE
Patient with a history of generalized weakness, prior admission discharged to [facility].  Patient arrives from home.  Typically walks with a walker, however was too weak to get out of bed today  PT/OT- recommended str   Weakness likely due to COVID infection  Patient will need placement to short-term rehab

## 2024-02-10 NOTE — ASSESSMENT & PLAN NOTE
Patient admitted in October 2023 for sepsis due to COVID, given IV remdesivir  Patient noted to have a fever of 101 at home today and was given 2 Tylenol,  Recurrent fever last evening of 100.6  Patient remains on room air  Today day 3 remdesivir

## 2024-02-10 NOTE — PLAN OF CARE
Problem: PAIN - ADULT  Goal: Verbalizes/displays adequate comfort level or baseline comfort level  Description: Interventions:  - Encourage patient to monitor pain and request assistance  - Assess pain using appropriate pain scale  - Administer analgesics based on type and severity of pain and evaluate response  - Implement non-pharmacological measures as appropriate and evaluate response  - Consider cultural and social influences on pain and pain management  - Notify physician/advanced practitioner if interventions unsuccessful or patient reports new pain  Outcome: Progressing     Problem: INFECTION - ADULT  Goal: Absence or prevention of progression during hospitalization  Description: INTERVENTIONS:  - Assess and monitor for signs and symptoms of infection  - Monitor lab/diagnostic results  - Monitor all insertion sites, i.e. indwelling lines, tubes, and drains  - Monitor endotracheal if appropriate and nasal secretions for changes in amount and color  - Earleton appropriate cooling/warming therapies per order  - Administer medications as ordered  - Instruct and encourage patient and family to use good hand hygiene technique  - Identify and instruct in appropriate isolation precautions for identified infection/condition  Outcome: Progressing     Problem: SAFETY ADULT  Goal: Patient will remain free of falls  Description: INTERVENTIONS:  - Educate patient/family on patient safety including physical limitations  - Instruct patient to call for assistance with activity   - Consult OT/PT to assist with strengthening/mobility   - Keep Call bell within reach  - Keep bed low and locked with side rails adjusted as appropriate  - Keep care items and personal belongings within reach  - Initiate and maintain comfort rounds  - Make Fall Risk Sign visible to staff  - Offer Toileting every 2 Hours, in advance of need  - Initiate/Maintain bed alarm  - Obtain necessary fall risk management equipment: socks   - Apply yellow  socks and bracelet for high fall risk patients  - Consider moving patient to room near nurses station  Outcome: Progressing  Goal: Maintain or return to baseline ADL function  Description: INTERVENTIONS:  -  Assess patient's ability to carry out ADLs; assess patient's baseline for ADL function and identify physical deficits which impact ability to perform ADLs (bathing, care of mouth/teeth, toileting, grooming, dressing, etc.)  - Assess/evaluate cause of self-care deficits   - Assess range of motion  - Assess patient's mobility; develop plan if impaired  - Assess patient's need for assistive devices and provide as appropriate  - Encourage maximum independence but intervene and supervise when necessary  - Involve family in performance of ADLs  - Assess for home care needs following discharge   - Consider OT consult to assist with ADL evaluation and planning for discharge  - Provide patient education as appropriate  Outcome: Progressing  Goal: Maintains/Returns to pre admission functional level  Description: INTERVENTIONS:  - Perform AM-PAC 6 Click Basic Mobility/ Daily Activity assessment daily.  - Set and communicate daily mobility goal to care team and patient/family/caregiver.   - Collaborate with rehabilitation services on mobility goals if consulted  - Perform Range of Motion 3 times a day.  - Reposition patient every 2 hours.  - Dangle patient 3 times a day  - Stand patient 3 times a day  - Ambulate patient 3 times a day  - Out of bed to chair 3 times a day   - Out of bed for meals 3 times a day  - Out of bed for toileting  - Record patient progress and toleration of activity level   Outcome: Progressing     Problem: DISCHARGE PLANNING  Goal: Discharge to home or other facility with appropriate resources  Description: INTERVENTIONS:  - Identify barriers to discharge w/patient and caregiver  - Arrange for needed discharge resources and transportation as appropriate  - Identify discharge learning needs (meds,  wound care, etc.)  - Arrange for interpretive services to assist at discharge as needed  - Refer to Case Management Department for coordinating discharge planning if the patient needs post-hospital services based on physician/advanced practitioner order or complex needs related to functional status, cognitive ability, or social support system  Outcome: Progressing     Problem: Knowledge Deficit  Goal: Patient/family/caregiver demonstrates understanding of disease process, treatment plan, medications, and discharge instructions  Description: Complete learning assessment and assess knowledge base.  Interventions:  - Provide teaching at level of understanding  - Provide teaching via preferred learning methods  Outcome: Progressing     Problem: Nutrition/Hydration-ADULT  Goal: Nutrient/Hydration intake appropriate for improving, restoring or maintaining nutritional needs  Description: Monitor and assess patient's nutrition/hydration status for malnutrition. Collaborate with interdisciplinary team and initiate plan and interventions as ordered.  Monitor patient's weight and dietary intake as ordered or per policy. Utilize nutrition screening tool and intervene as necessary. Determine patient's food preferences and provide high-protein, high-caloric foods as appropriate.     INTERVENTIONS:  - Monitor oral intake, urinary output, labs, and treatment plans  - Assess nutrition and hydration status and recommend course of action  - Evaluate amount of meals eaten  - Assist patient with eating if necessary   - Allow adequate time for meals  - Recommend/ encourage appropriate diets, oral nutritional supplements, and vitamin/mineral supplements  - Order, calculate, and assess calorie counts as needed  - Recommend, monitor, and adjust tube feedings and TPN/PPN based on assessed needs  - Assess need for intravenous fluids  - Provide specific nutrition/hydration education as appropriate  - Include patient/family/caregiver in  decisions related to nutrition  Outcome: Progressing     Problem: MOBILITY - ADULT  Goal: Maintain or return to baseline ADL function  Description: INTERVENTIONS:  -  Assess patient's ability to carry out ADLs; assess patient's baseline for ADL function and identify physical deficits which impact ability to perform ADLs (bathing, care of mouth/teeth, toileting, grooming, dressing, etc.)  - Assess/evaluate cause of self-care deficits   - Assess range of motion  - Assess patient's mobility; develop plan if impaired  - Assess patient's need for assistive devices and provide as appropriate  - Encourage maximum independence but intervene and supervise when necessary  - Involve family in performance of ADLs  - Assess for home care needs following discharge   - Consider OT consult to assist with ADL evaluation and planning for discharge  - Provide patient education as appropriate  Outcome: Progressing  Goal: Maintains/Returns to pre admission functional level  Description: INTERVENTIONS:  - Perform AM-PAC 6 Click Basic Mobility/ Daily Activity assessment daily.  - Set and communicate daily mobility goal to care team and patient/family/caregiver.   - Collaborate with rehabilitation services on mobility goals if consulted  - Perform Range of Motion 3 times a day.  - Reposition patient every 2 hours.  - Dangle patient 3 times a day  - Stand patient 3 times a day  - Ambulate patient 3 times a day  - Out of bed to chair 3 times a day   - Out of bed for meals 3 times a day  - Out of bed for toileting  - Record patient progress and toleration of activity level   Outcome: Progressing     Problem: NEUROSENSORY - ADULT  Goal: Achieves stable or improved neurological status  Description: INTERVENTIONS  - Monitor and report changes in neurological status  - Monitor vital signs such as temperature, blood pressure, glucose, and any other labs ordered   - Initiate measures to prevent increased intracranial pressure  - Monitor for seizure  activity and implement precautions if appropriate      Outcome: Progressing  Goal: Remains free of injury related to seizures activity  Description: INTERVENTIONS  - Maintain airway, patient safety  and administer oxygen as ordered  - Monitor patient for seizure activity, document and report duration and description of seizure to physician/advanced practitioner  - If seizure occurs,  ensure patient safety during seizure  - Reorient patient post seizure  - Seizure pads on all 4 side rails  - Instruct patient/family to notify RN of any seizure activity including if an aura is experienced  - Instruct patient/family to call for assistance with activity based on nursing assessment  - Administer anti-seizure medications if ordered    Outcome: Progressing  Goal: Achieves maximal functionality and self care  Description: INTERVENTIONS  - Monitor swallowing and airway patency with patient fatigue and changes in neurological status  - Encourage and assist patient to increase activity and self care.   - Encourage visually impaired, hearing impaired and aphasic patients to use assistive/communication devices  Outcome: Progressing     Problem: CARDIOVASCULAR - ADULT  Goal: Maintains optimal cardiac output and hemodynamic stability  Description: INTERVENTIONS:  - Monitor I/O, vital signs and rhythm  - Monitor for S/S and trends of decreased cardiac output  - Administer and titrate ordered vasoactive medications to optimize hemodynamic stability  - Assess quality of pulses, skin color and temperature  - Assess for signs of decreased coronary artery perfusion  - Instruct patient to report change in severity of symptoms  Outcome: Progressing  Goal: Absence of cardiac dysrhythmias or at baseline rhythm  Description: INTERVENTIONS:  - Continuous cardiac monitoring, vital signs, obtain 12 lead EKG if ordered  - Administer antiarrhythmic and heart rate control medications as ordered  - Monitor electrolytes and administer replacement  therapy as ordered  Outcome: Progressing     Problem: RESPIRATORY - ADULT  Goal: Achieves optimal ventilation and oxygenation  Description: INTERVENTIONS:  - Assess for changes in respiratory status  - Assess for changes in mentation and behavior  - Position to facilitate oxygenation and minimize respiratory effort  - Oxygen administered by appropriate delivery if ordered  - Initiate smoking cessation education as indicated  - Encourage broncho-pulmonary hygiene including cough, deep breathe, Incentive Spirometry  - Assess the need for suctioning and aspirate as needed  - Assess and instruct to report SOB or any respiratory difficulty  - Respiratory Therapy support as indicated  Outcome: Progressing     Problem: METABOLIC, FLUID AND ELECTROLYTES - ADULT  Goal: Electrolytes maintained within normal limits  Description: INTERVENTIONS:  - Monitor labs and assess patient for signs and symptoms of electrolyte imbalances  - Administer electrolyte replacement as ordered  - Monitor response to electrolyte replacements, including repeat lab results as appropriate  - Instruct patient on fluid and nutrition as appropriate  Outcome: Progressing  Goal: Fluid balance maintained  Description: INTERVENTIONS:  - Monitor labs   - Monitor I/O and WT  - Instruct patient on fluid and nutrition as appropriate  - Assess for signs & symptoms of volume excess or deficit  Outcome: Progressing  Goal: Glucose maintained within target range  Description: INTERVENTIONS:  - Monitor Blood Glucose as ordered  - Assess for signs and symptoms of hyperglycemia and hypoglycemia  - Administer ordered medications to maintain glucose within target range  - Assess nutritional intake and initiate nutrition service referral as needed  Outcome: Progressing     Problem: SKIN/TISSUE INTEGRITY - ADULT  Goal: Skin Integrity remains intact(Skin Breakdown Prevention)  Description: Assess:  -Perform Arnaud assessment every shift  -Clean and moisturize skin every  day  -Inspect skin when repositioning, toileting, and assisting with ADLS  -Assess extremities for adequate circulation and sensation     Bed Management:  -Have minimal linens on bed & keep smooth, unwrinkled  -Change linens as needed when moist or perspiring  -Avoid sitting or lying in one position for more than 2 hours while in bed  -Keep HOB at 30 degrees     Toileting:  -Offer bedside commode  -Assess for incontinence every shift  -Use incontinent care products after each incontinent episode such as faom    Activity:  -Mobilize patient 3 times a day  -Encourage activity and walks on unit  -Encourage or provide ROM exercises   -Turn and reposition patient every 8 Hours  -Use appropriate equipment to lift or move patient in bed  -Instruct/ Assist with weight shifting every 2 when out of bed in chair  -Consider limitation of chair time 120 hour intervals    Skin Care:  -Avoid use of baby powder, tape, friction and shearing, hot water or constrictive clothing  -Relieve pressure over bony prominences using wedges  -Do not massage red bony areas    Next Steps:  -Teach patient strategies to minimize risks such as fall   -Consider consults to  interdisciplinary teams such as PT/OT  Outcome: Progressing  Goal: Incision(s), wounds(s) or drain site(s) healing without S/S of infection  Description: INTERVENTIONS  - Assess and document dressing, incision, wound bed, drain sites and surrounding tissue  - Provide patient and family education  - Perform skin care/dressing changes as ordered  Outcome: Progressing  Goal: Pressure injury heals and does not worsen  Description: Interventions:  - Implement low air loss mattress or specialty surface (Criteria met)  - Apply silicone foam dressing  - Instruct/assist with weight shifting every  minutes when in chair   - Limit chair time to  hour intervals  - Use special pressure reducing interventions such as  when in chair   - Apply fecal or urinary incontinence containment device   -  Perform passive or active ROM every   - Turn and reposition patient & offload bony prominences every  hours   - Utilize friction reducing device or surface for transfers   - Consider consults to  interdisciplinary teams such as   - Use incontinent care products after each incontinent episode such as   - Consider nutrition services referral as needed  Outcome: Progressing     Problem: MUSCULOSKELETAL - ADULT  Goal: Maintain or return mobility to safest level of function  Description: INTERVENTIONS:  - Assess patient's ability to carry out ADLs; assess patient's baseline for ADL function and identify physical deficits which impact ability to perform ADLs (bathing, care of mouth/teeth, toileting, grooming, dressing, etc.)  - Assess/evaluate cause of self-care deficits   - Assess range of motion  - Assess patient's mobility  - Assess patient's need for assistive devices and provide as appropriate  - Encourage maximum independence but intervene and supervise when necessary  - Involve family in performance of ADLs  - Assess for home care needs following discharge   - Consider OT consult to assist with ADL evaluation and planning for discharge  - Provide patient education as appropriate  Outcome: Progressing  Goal: Maintain proper alignment of affected body part  Description: INTERVENTIONS:  - Support, maintain and protect limb and body alignment  - Provide patient/ family with appropriate education  Outcome: Progressing     Problem: Prexisting or High Potential for Compromised Skin Integrity  Goal: Skin integrity is maintained or improved  Description: INTERVENTIONS:  - Identify patients at risk for skin breakdown  - Assess and monitor skin integrity  - Assess and monitor nutrition and hydration status  - Monitor labs   - Assess for incontinence   - Turn and reposition patient  - Assist with mobility/ambulation  - Relieve pressure over bony prominences  - Avoid friction and shearing  - Provide appropriate hygiene as  needed including keeping skin clean and dry  - Evaluate need for skin moisturizer/barrier cream  - Collaborate with interdisciplinary team   - Patient/family teaching  - Consider wound care consult   Outcome: Progressing

## 2024-02-10 NOTE — ASSESSMENT & PLAN NOTE
Patient with history of NPH, hypertension induced encephalopathy, recent diagnosis of COVID was found to have increased lethargy and confusion around 3 PM this afternoon when his wife came to check on him.  Baseline GCS is typically greater than 12 given prior history of multiple strokes.  GCS at time of admission 13, patient is only oriented to self  CT head and neck:  No changes from prior imaging studies, no intracranial hemorrhage or mass, stable old infarcts  Altered mental status likely in the setting of COVID infection, mental status returned to baseline  Continue to monitor neuro status  Case discussed with patient's wife mental status at baseline  Patient is home by himself for most of the day as patient's wife works.  She does not think he strong enough or able to care for himself at home by himself.  Monitor intensity of needs as noted by physical therapy.  Patient was with require short-term rehab this was discussed with the wife at length and she is agreeable.  Will discuss with case management

## 2024-02-10 NOTE — UTILIZATION REVIEW
NOTIFICATION OF INPATIENT ADMISSION   AUTHORIZATION REQUEST   SERVICING FACILITY:   [place] [address]  Tax ID: [id number]  NPI: [id number] ATTENDING PROVIDER:  Attending Name and NPI#: Spenser Lopez Md [[id number]]  Address: [address] [place] [address]  Phone: [phone]   ADMISSION INFORMATION:  Place of Service: Inpatient [facility]  Place of Service Code: 21  Inpatient Admission Date/Time: 2/9/24 12:47 PM  Discharge Date/Time: No discharge date for patient encounter.  Admitting Diagnosis Code/Description:  Hypokalemia [E87.6]  Altered mental status [R41.82]  Generalized weakness [R53.1]  Focal epilepsy (HCC) [G40.109]  Ambulatory dysfunction [R26.2]  AMS (altered mental status) [R41.82]  Upper respiratory tract infection due to COVID-19 virus [U07.1, J06.9]     UTILIZATION REVIEW CONTACT:  Pilar Corea Utilization   Network Utilization Review Department  Phone: [phone]  Fax [phone]  Email: [email or web address]  Contact for approvals/pending authorizations, clinical reviews, and discharge.     PHYSICIAN ADVISORY SERVICES:  Medical Necessity Denial & [id number] Review  Phone: [phone]  Fax: [phone]  Email: [email or web address]     DISCHARGE SUPPORT TEAM:  For Patients Discharge Needs & Updates  Phone: [phone] opt. 2 Fax: [phone]  Email: [email or web address]

## 2024-02-10 NOTE — ASSESSMENT & PLAN NOTE
Patient with potassium of 3.2 on admission  Received 40 mill equivalents potassium in the ED  Continue isolate IVF  Hold home HTZ 12.5mg  Hypokalemia resolved  Patient off IV fluid  Repeat BMP in a.m.

## 2024-02-10 NOTE — PROGRESS NOTES
[facility]  Progress Note  Name: Constanza Zhu I  MRN: [id number]  Unit/Bed#: -01 I Date of Admission: 2/7/2024   Date of Service: 2/10/2024 I Hospital Day: 1    Assessment/Plan   * AMS (altered mental status)  Assessment & Plan  Patient with history of NPH, hypertension induced encephalopathy, recent diagnosis of COVID was found to have increased lethargy and confusion around 3 PM this afternoon when his wife came to check on him.  Baseline GCS is typically greater than 12 given prior history of multiple strokes.  GCS at time of admission 13, patient is only oriented to self  CT head and neck:  No changes from prior imaging studies, no intracranial hemorrhage or mass, stable old infarcts  Altered mental status likely in the setting of COVID infection, mental status returned to baseline  Continue to monitor neuro status  Case discussed with patient's wife mental status at baseline  Patient is home by himself for most of the day as patient's wife works.  She does not think he strong enough or able to care for himself at home by himself.  Monitor intensity of needs as noted by physical therapy.  Patient was with require short-term rehab this was discussed with the wife at length and she is agreeable.  Will discuss with case management    COVID  Assessment & Plan  Patient admitted in October 2023 for sepsis due to COVID, given IV remdesivir  Patient noted to have a fever of 101 at home today and was given 2 Tylenol,  Recurrent fever last evening of 100.6  Patient remains on room air  Today day 3 remdesivir    Generalized weakness  Assessment & Plan  Patient with a history of generalized weakness, prior admission discharged to [facility].  Patient arrives from home.  Typically walks with a walker, however was too weak to get out of bed today  PT/OT- recommended str   Weakness likely due to COVID infection  Patient will need placement to short-term rehab    Focal epilepsy  ([facility])  Assessment & Plan  Patient with history of staring episodes, felt to be absence seizure's  Follows with neurology as outpatient  No acute events  continue Keppra 500 Mg    Hypokalemia  Assessment & Plan  Patient with potassium of 3.2 on admission  Received 40 mill equivalents potassium in the ED  Continue isolate IVF  Hold home HTZ 12.5mg  Hypokalemia resolved  Patient off IV fluid  Repeat BMP in a.m.    History of prediabetes  Assessment & Plan  Continue carb controlled diet and monitor sugars with BMP  Sugars remained stable    Primary hypertension  Assessment & Plan  Continue losartan and metoprolol  Patient hypertensive on admission, as needed labetalol IV for SBP greater than 180  Blood pressure control remains excellent             VTE Pharmacologic Prophylaxis: VTE Score: 8 High Risk (Score >/= 5) - Pharmacological DVT Prophylaxis Ordered: enoxaparin (Lovenox). Sequential Compression Devices Ordered.    Mobility:   Basic Mobility Inpatient Raw Score: 15  JH-HLM Goal: 4: Move to chair/commode  JH-HLM Achieved: 7: Walk 25 feet or more  HLM Goal NOT achieved. Continue with multidisciplinary rounding and encourage appropriate mobility to improve upon HLM goals.    Patient Centered Rounds: I evaluated the patient without nursing staff present due to respiratory isolation for COVID    Discussions with Specialists or Other Care Team Provider: Nursing    Education and Discussions with Family / Patient: Updated  (wife) via phone.    Total Time Spent on Date of Encounter in care of patient:  mins. This time was spent on one or more of the following: performing physical exam; counseling and coordination of care; obtaining or reviewing history; documenting in the medical record; reviewing/ordering tests, medications or procedures; communicating with other healthcare professionals and discussing with patient's family/caregivers.    Current Length of Stay: 1 day(s)  Current Patient Status: Inpatient  "  Certification Statement: The patient will continue to require additional inpatient hospital stay due to need for continue IV remdesivir and discharge planning  Discharge Plan: Anticipate discharge in 24-48 hrs to rehab facility.    Code Status: Level 1 - Full Code    Subjective:   \"Can you send me home?\"    Objective:     Vitals:   Temp (24hrs), Av.5 °F (36.9 °C), Min:98.4 °F (36.9 °C), Max:98.6 °F (37 °C)    Temp:  [98.4 °F (36.9 °C)-98.6 °F (37 °C)] 98.4 °F (36.9 °C)  HR:  [82] 82  Resp:  [12-20] 14  BP: (117-121)/(65-67) 117/67  SpO2:  [94 %] 94 %  There is no height or weight on file to calculate BMI.     Input and Output Summary (last 24 hours):     Intake/Output Summary (Last 24 hours) at 2/10/2024 0716  Last data filed at 2024 1537  Gross per 24 hour   Intake 120 ml   Output 500 ml   Net -380 ml       Physical Exam:   Physical Exam  Constitutional:       General: He is not in acute distress.     Appearance: He is not toxic-appearing or diaphoretic.   Eyes:      General: No scleral icterus.  Cardiovascular:      Rate and Rhythm: Normal rate and regular rhythm.      Pulses: Normal pulses.      Heart sounds: Normal heart sounds.   Pulmonary:      Effort: Pulmonary effort is normal.      Breath sounds: Normal breath sounds. No wheezing or rales.   Abdominal:      General: There is no distension.      Palpations: Abdomen is soft.      Tenderness: There is no abdominal tenderness. There is no guarding or rebound.   Musculoskeletal:      Cervical back: Normal range of motion and neck supple.      Right lower leg: No edema.      Left lower leg: No edema.   Neurological:      Mental Status: He is oriented to person, place, and time.          Additional Data:     Labs:  Results from last 7 days   Lab Units 02/10/24  0358   WBC Thousand/uL 10.76*   HEMOGLOBIN g/dL 13.7   HEMATOCRIT % 41.3   PLATELETS Thousands/uL 185   NEUTROS PCT % 67   LYMPHS PCT % 16   MONOS PCT % 13*   EOS PCT % 2     Results from last 7 " days   Lab Units 02/10/24  0358   SODIUM mmol/L 134*   POTASSIUM mmol/L 3.7   CHLORIDE mmol/L 103   CO2 mmol/L 21   BUN mg/dL 26*   CREATININE mg/dL 0.93   ANION GAP mmol/L 10   CALCIUM mg/dL 8.9   ALBUMIN g/dL 3.7   TOTAL BILIRUBIN mg/dL 0.63   ALK PHOS U/L 66   ALT U/L 15   AST U/L 21   GLUCOSE RANDOM mg/dL 96                 Results from last 7 days   Lab Units 02/08/24  0708   PROCALCITONIN ng/ml 0.06       Lines/Drains:  Invasive Devices       Peripheral Intravenous Line  Duration             Peripheral IV 02/09/24 Right;Ventral (anterior) Forearm 1 day              Drain  Duration             External Urinary Catheter Small 1 day                          Imaging: Reviewed radiology reports from this admission including: CT head, xray(s), and CTA H&N    Recent Cultures (last 7 days):         Last 24 Hours Medication List:   Current Facility-Administered Medications   Medication Dose Route Frequency Provider Last Rate    aspirin  81 mg Oral Daily Margarita Torres PA-C      atorvastatin  40 mg Oral Daily With Breakfast Margarita Torres PA-C      baclofen  5 mg Oral BID PRN Margarita Torres PA-C      donepezil  10 mg Oral Daily Margarita Torres PA-C      enoxaparin  40 mg Subcutaneous Daily Margarita Torres PA-C      levETIRAcetam  500 mg Oral Q12H JOSE Margarita Torres PA-C      losartan  50 mg Oral Daily Margarita Torres PA-C      melatonin  5 mg Oral HS Margarita Torres PA-C      metoprolol succinate  100 mg Oral Daily Margarita Torres PA-C      pantoprazole  20 mg Oral Early Morning Margarita Torres PA-C      remdesivir  100 mg Intravenous Q24H Margarita Torres PA-C 100 mg (02/09/24 0608)    tamsulosin  0.4 mg Oral Daily With Dinner Margarita Torres PA-C      ticagrelor  90 mg Oral Q12H JOSE Margarita Torres PA-C      traZODone  100 mg Oral HS Margarita Torres PA-C          Today, Patient Was Seen By: Spenser Lopez MD    **Please Note: This note may have been constructed using a  voice recognition system.**

## 2024-02-10 NOTE — ASSESSMENT & PLAN NOTE
Continue losartan and metoprolol  Patient hypertensive on admission, as needed labetalol IV for SBP greater than 180  Blood pressure control remains excellent

## 2024-02-10 NOTE — PLAN OF CARE
Problem: PAIN - ADULT  Goal: Verbalizes/displays adequate comfort level or baseline comfort level  Description: Interventions:  - Encourage patient to monitor pain and request assistance  - Assess pain using appropriate pain scale  - Administer analgesics based on type and severity of pain and evaluate response  - Implement non-pharmacological measures as appropriate and evaluate response  - Consider cultural and social influences on pain and pain management  - Notify physician/advanced practitioner if interventions unsuccessful or patient reports new pain  Outcome: Progressing     Problem: INFECTION - ADULT  Goal: Absence or prevention of progression during hospitalization  Description: INTERVENTIONS:  - Assess and monitor for signs and symptoms of infection  - Monitor lab/diagnostic results  - Monitor all insertion sites, i.e. indwelling lines, tubes, and drains  - Monitor endotracheal if appropriate and nasal secretions for changes in amount and color  - [place] appropriate cooling/warming therapies per order  - Administer medications as ordered  - Instruct and encourage patient and family to use good hand hygiene technique  - Identify and instruct in appropriate isolation precautions for identified infection/condition  Outcome: Progressing     Problem: CARDIOVASCULAR - ADULT  Goal: Maintains optimal cardiac output and hemodynamic stability  Description: INTERVENTIONS:  - Monitor I/O, vital signs and rhythm  - Monitor for S/S and trends of decreased cardiac output  - Administer and titrate ordered vasoactive medications to optimize hemodynamic stability  - Assess quality of pulses, skin color and temperature  - Assess for signs of decreased coronary artery perfusion  - Instruct patient to report change in severity of symptoms  Outcome: Progressing     Problem: RESPIRATORY - ADULT  Goal: Achieves optimal ventilation and oxygenation  Description: INTERVENTIONS:  - Assess for changes in respiratory status  -  Assess for changes in mentation and behavior  - Position to facilitate oxygenation and minimize respiratory effort  - Oxygen administered by appropriate delivery if ordered  - Initiate smoking cessation education as indicated  - Encourage broncho-pulmonary hygiene including cough, deep breathe, Incentive Spirometry  - Assess the need for suctioning and aspirate as needed  - Assess and instruct to report SOB or any respiratory difficulty  - Respiratory Therapy support as indicated  Outcome: Progressing     Problem: MUSCULOSKELETAL - ADULT  Goal: Maintain or return mobility to safest level of function  Description: INTERVENTIONS:  - Assess patient's ability to carry out ADLs; assess patient's baseline for ADL function and identify physical deficits which impact ability to perform ADLs (bathing, care of mouth/teeth, toileting, grooming, dressing, etc.)  - Assess/evaluate cause of self-care deficits   - Assess range of motion  - Assess patient's mobility  - Assess patient's need for assistive devices and provide as appropriate  - Encourage maximum independence but intervene and supervise when necessary  - Involve family in performance of ADLs  - Assess for home care needs following discharge   - Consider OT consult to assist with ADL evaluation and planning for discharge  - Provide patient education as appropriate  Outcome: Progressing     Problem: Prexisting or High Potential for Compromised Skin Integrity  Goal: Skin integrity is maintained or improved  Description: INTERVENTIONS:  - Identify patients at risk for skin breakdown  - Assess and monitor skin integrity  - Assess and monitor nutrition and hydration status  - Monitor labs   - Assess for incontinence   - Turn and reposition patient  - Assist with mobility/ambulation  - Relieve pressure over bony prominences  - Avoid friction and shearing  - Provide appropriate hygiene as needed including keeping skin clean and dry  - Evaluate need for skin moisturizer/barrier  cream  - Collaborate with interdisciplinary team   - Patient/family teaching  - Consider wound care consult   Outcome: Progressing

## 2024-02-10 NOTE — ASSESSMENT & PLAN NOTE
Patient with history of staring episodes, felt to be absence seizure's  Follows with neurology as outpatient  No acute events  continue Keppra 500 Mg

## 2024-02-11 LAB
ANION GAP SERPL CALCULATED.3IONS-SCNC: 8 MMOL/L
BUN SERPL-MCNC: 29 MG/DL (ref 5–25)
CALCIUM SERPL-MCNC: 9.2 MG/DL (ref 8.4–10.2)
CHLORIDE SERPL-SCNC: 107 MMOL/L (ref 96–108)
CO2 SERPL-SCNC: 24 MMOL/L (ref 21–32)
CREAT SERPL-MCNC: 0.9 MG/DL (ref 0.6–1.3)
GFR SERPL CREATININE-BSD FRML MDRD: 89 ML/MIN/1.73SQ M
GLUCOSE SERPL-MCNC: 104 MG/DL (ref 65–140)
POTASSIUM SERPL-SCNC: 3.5 MMOL/L (ref 3.5–5.3)
SODIUM SERPL-SCNC: 139 MMOL/L (ref 135–147)

## 2024-02-11 PROCEDURE — 80048 BASIC METABOLIC PNL TOTAL CA: CPT | Performed by: INTERNAL MEDICINE

## 2024-02-11 PROCEDURE — 99232 SBSQ HOSP IP/OBS MODERATE 35: CPT | Performed by: INTERNAL MEDICINE

## 2024-02-11 RX ADMIN — TAMSULOSIN HYDROCHLORIDE 0.4 MG: 0.4 CAPSULE ORAL at 17:48

## 2024-02-11 RX ADMIN — LEVETIRACETAM 500 MG: 500 TABLET, FILM COATED ORAL at 21:12

## 2024-02-11 RX ADMIN — ENOXAPARIN SODIUM 40 MG: 100 INJECTION SUBCUTANEOUS at 08:48

## 2024-02-11 RX ADMIN — TICAGRELOR 90 MG: 90 TABLET ORAL at 08:48

## 2024-02-11 RX ADMIN — METOPROLOL SUCCINATE 100 MG: 50 TABLET, EXTENDED RELEASE ORAL at 08:48

## 2024-02-11 RX ADMIN — TICAGRELOR 90 MG: 90 TABLET ORAL at 21:12

## 2024-02-11 RX ADMIN — LOSARTAN POTASSIUM 50 MG: 50 TABLET, FILM COATED ORAL at 08:48

## 2024-02-11 RX ADMIN — DONEPEZIL HYDROCHLORIDE 10 MG: 5 TABLET ORAL at 08:48

## 2024-02-11 RX ADMIN — GLYCERIN 1 DROP: .002; .002; .01 SOLUTION/ DROPS OPHTHALMIC at 16:51

## 2024-02-11 RX ADMIN — GLYCERIN 1 DROP: .002; .002; .01 SOLUTION/ DROPS OPHTHALMIC at 08:48

## 2024-02-11 RX ADMIN — ATORVASTATIN CALCIUM 40 MG: 40 TABLET, FILM COATED ORAL at 08:48

## 2024-02-11 RX ADMIN — REMDESIVIR 100 MG: 100 INJECTION, POWDER, LYOPHILIZED, FOR SOLUTION INTRAVENOUS at 06:14

## 2024-02-11 RX ADMIN — LEVETIRACETAM 500 MG: 500 TABLET, FILM COATED ORAL at 08:48

## 2024-02-11 RX ADMIN — ASPIRIN 81 MG: 81 TABLET, COATED ORAL at 08:48

## 2024-02-11 RX ADMIN — Medication 5 MG: at 21:12

## 2024-02-11 RX ADMIN — TRAZODONE HYDROCHLORIDE 100 MG: 50 TABLET ORAL at 21:12

## 2024-02-11 RX ADMIN — PANTOPRAZOLE SODIUM 20 MG: 20 TABLET, DELAYED RELEASE ORAL at 06:14

## 2024-02-11 NOTE — PLAN OF CARE
Problem: PAIN - ADULT  Goal: Verbalizes/displays adequate comfort level or baseline comfort level  Description: Interventions:  - Encourage patient to monitor pain and request assistance  - Assess pain using appropriate pain scale  - Administer analgesics based on type and severity of pain and evaluate response  - Implement non-pharmacological measures as appropriate and evaluate response  - Consider cultural and social influences on pain and pain management  - Notify physician/advanced practitioner if interventions unsuccessful or patient reports new pain  Outcome: Progressing     Problem: INFECTION - ADULT  Goal: Absence or prevention of progression during hospitalization  Description: INTERVENTIONS:  - Assess and monitor for signs and symptoms of infection  - Monitor lab/diagnostic results  - Monitor all insertion sites, i.e. indwelling lines, tubes, and drains  - Monitor endotracheal if appropriate and nasal secretions for changes in amount and color  - [place] appropriate cooling/warming therapies per order  - Administer medications as ordered  - Instruct and encourage patient and family to use good hand hygiene technique  - Identify and instruct in appropriate isolation precautions for identified infection/condition  Outcome: Progressing     Problem: SAFETY ADULT  Goal: Patient will remain free of falls  Description: INTERVENTIONS:  - Educate patient/family on patient safety including physical limitations  - Instruct patient to call for assistance with activity   - Consult OT/PT to assist with strengthening/mobility   - Keep Call bell within reach  - Keep bed low and locked with side rails adjusted as appropriate  - Keep care items and personal belongings within reach  - Initiate and maintain comfort rounds  - Make Fall Risk Sign visible to staff  - Offer Toileting every 2 Hours, in advance of need  - Initiate/Maintain bed alarm  - Obtain necessary fall risk management equipment: socks   - Apply yellow  socks and bracelet for high fall risk patients  - Consider moving patient to room near nurses station  Outcome: Progressing  Goal: Maintain or return to baseline ADL function  Description: INTERVENTIONS:  -  Assess patient's ability to carry out ADLs; assess patient's baseline for ADL function and identify physical deficits which impact ability to perform ADLs (bathing, care of mouth/teeth, toileting, grooming, dressing, etc.)  - Assess/evaluate cause of self-care deficits   - Assess range of motion  - Assess patient's mobility; develop plan if impaired  - Assess patient's need for assistive devices and provide as appropriate  - Encourage maximum independence but intervene and supervise when necessary  - Involve family in performance of ADLs  - Assess for home care needs following discharge   - Consider OT consult to assist with ADL evaluation and planning for discharge  - Provide patient education as appropriate  Outcome: Progressing  Goal: Maintains/Returns to pre admission functional level  Description: INTERVENTIONS:  - Perform AM-PAC 6 Click Basic Mobility/ Daily Activity assessment daily.  - Set and communicate daily mobility goal to care team and patient/family/caregiver.   - Collaborate with rehabilitation services on mobility goals if consulted  - Perform Range of Motion 3 times a day.  - Reposition patient every 2 hours.  - Dangle patient 3 times a day  - Stand patient 3 times a day  - Ambulate patient 3 times a day  - Out of bed to chair 3 times a day   - Out of bed for meals 3 times a day  - Out of bed for toileting  - Record patient progress and toleration of activity level   Outcome: Progressing     Problem: DISCHARGE PLANNING  Goal: Discharge to home or other facility with appropriate resources  Description: INTERVENTIONS:  - Identify barriers to discharge w/patient and caregiver  - Arrange for needed discharge resources and transportation as appropriate  - Identify discharge learning needs (meds,  wound care, etc.)  - Arrange for interpretive services to assist at discharge as needed  - Refer to Case Management Department for coordinating discharge planning if the patient needs post-hospital services based on physician/advanced practitioner order or complex needs related to functional status, cognitive ability, or social support system  Outcome: Progressing     Problem: Knowledge Deficit  Goal: Patient/family/caregiver demonstrates understanding of disease process, treatment plan, medications, and discharge instructions  Description: Complete learning assessment and assess knowledge base.  Interventions:  - Provide teaching at level of understanding  - Provide teaching via preferred learning methods  Outcome: Progressing     Problem: Nutrition/Hydration-ADULT  Goal: Nutrient/Hydration intake appropriate for improving, restoring or maintaining nutritional needs  Description: Monitor and assess patient's nutrition/hydration status for malnutrition. Collaborate with interdisciplinary team and initiate plan and interventions as ordered.  Monitor patient's weight and dietary intake as ordered or per policy. Utilize nutrition screening tool and intervene as necessary. Determine patient's food preferences and provide high-protein, high-caloric foods as appropriate.     INTERVENTIONS:  - Monitor oral intake, urinary output, labs, and treatment plans  - Assess nutrition and hydration status and recommend course of action  - Evaluate amount of meals eaten  - Assist patient with eating if necessary   - Allow adequate time for meals  - Recommend/ encourage appropriate diets, oral nutritional supplements, and vitamin/mineral supplements  - Order, calculate, and assess calorie counts as needed  - Recommend, monitor, and adjust tube feedings and TPN/PPN based on assessed needs  - Assess need for intravenous fluids  - Provide specific nutrition/hydration education as appropriate  - Include patient/family/caregiver in  decisions related to nutrition  Outcome: Progressing     Problem: MOBILITY - ADULT  Goal: Maintain or return to baseline ADL function  Description: INTERVENTIONS:  -  Assess patient's ability to carry out ADLs; assess patient's baseline for ADL function and identify physical deficits which impact ability to perform ADLs (bathing, care of mouth/teeth, toileting, grooming, dressing, etc.)  - Assess/evaluate cause of self-care deficits   - Assess range of motion  - Assess patient's mobility; develop plan if impaired  - Assess patient's need for assistive devices and provide as appropriate  - Encourage maximum independence but intervene and supervise when necessary  - Involve family in performance of ADLs  - Assess for home care needs following discharge   - Consider OT consult to assist with ADL evaluation and planning for discharge  - Provide patient education as appropriate  Outcome: Progressing  Goal: Maintains/Returns to pre admission functional level  Description: INTERVENTIONS:  - Perform AM-PAC 6 Click Basic Mobility/ Daily Activity assessment daily.  - Set and communicate daily mobility goal to care team and patient/family/caregiver.   - Collaborate with rehabilitation services on mobility goals if consulted  - Perform Range of Motion 3 times a day.  - Reposition patient every 2 hours.  - Dangle patient 3 times a day  - Stand patient 3 times a day  - Ambulate patient 3 times a day  - Out of bed to chair 3 times a day   - Out of bed for meals 3 times a day  - Out of bed for toileting  - Record patient progress and toleration of activity level   Outcome: Progressing     Problem: NEUROSENSORY - ADULT  Goal: Achieves stable or improved neurological status  Description: INTERVENTIONS  - Monitor and report changes in neurological status  - Monitor vital signs such as temperature, blood pressure, glucose, and any other labs ordered   - Initiate measures to prevent increased intracranial pressure  - Monitor for seizure  activity and implement precautions if appropriate      Outcome: Progressing  Goal: Remains free of injury related to seizures activity  Description: INTERVENTIONS  - Maintain airway, patient safety  and administer oxygen as ordered  - Monitor patient for seizure activity, document and report duration and description of seizure to physician/advanced practitioner  - If seizure occurs,  ensure patient safety during seizure  - Reorient patient post seizure  - Seizure pads on all 4 side rails  - Instruct patient/family to notify RN of any seizure activity including if an aura is experienced  - Instruct patient/family to call for assistance with activity based on nursing assessment  - Administer anti-seizure medications if ordered    Outcome: Progressing  Goal: Achieves maximal functionality and self care  Description: INTERVENTIONS  - Monitor swallowing and airway patency with patient fatigue and changes in neurological status  - Encourage and assist patient to increase activity and self care.   - Encourage visually impaired, hearing impaired and aphasic patients to use assistive/communication devices  Outcome: Progressing     Problem: CARDIOVASCULAR - ADULT  Goal: Maintains optimal cardiac output and hemodynamic stability  Description: INTERVENTIONS:  - Monitor I/O, vital signs and rhythm  - Monitor for S/S and trends of decreased cardiac output  - Administer and titrate ordered vasoactive medications to optimize hemodynamic stability  - Assess quality of pulses, skin color and temperature  - Assess for signs of decreased coronary artery perfusion  - Instruct patient to report change in severity of symptoms  Outcome: Progressing  Goal: Absence of cardiac dysrhythmias or at baseline rhythm  Description: INTERVENTIONS:  - Continuous cardiac monitoring, vital signs, obtain 12 lead EKG if ordered  - Administer antiarrhythmic and heart rate control medications as ordered  - Monitor electrolytes and administer replacement  therapy as ordered  Outcome: Progressing     Problem: RESPIRATORY - ADULT  Goal: Achieves optimal ventilation and oxygenation  Description: INTERVENTIONS:  - Assess for changes in respiratory status  - Assess for changes in mentation and behavior  - Position to facilitate oxygenation and minimize respiratory effort  - Oxygen administered by appropriate delivery if ordered  - Initiate smoking cessation education as indicated  - Encourage broncho-pulmonary hygiene including cough, deep breathe, Incentive Spirometry  - Assess the need for suctioning and aspirate as needed  - Assess and instruct to report SOB or any respiratory difficulty  - Respiratory Therapy support as indicated  Outcome: Progressing     Problem: METABOLIC, FLUID AND ELECTROLYTES - ADULT  Goal: Electrolytes maintained within normal limits  Description: INTERVENTIONS:  - Monitor labs and assess patient for signs and symptoms of electrolyte imbalances  - Administer electrolyte replacement as ordered  - Monitor response to electrolyte replacements, including repeat lab results as appropriate  - Instruct patient on fluid and nutrition as appropriate  Outcome: Progressing  Goal: Fluid balance maintained  Description: INTERVENTIONS:  - Monitor labs   - Monitor I/O and WT  - Instruct patient on fluid and nutrition as appropriate  - Assess for signs & symptoms of volume excess or deficit  Outcome: Progressing  Goal: Glucose maintained within target range  Description: INTERVENTIONS:  - Monitor Blood Glucose as ordered  - Assess for signs and symptoms of hyperglycemia and hypoglycemia  - Administer ordered medications to maintain glucose within target range  - Assess nutritional intake and initiate nutrition service referral as needed  Outcome: Progressing     Problem: SKIN/TISSUE INTEGRITY - ADULT  Goal: Skin Integrity remains intact(Skin Breakdown Prevention)  Description: Assess:  -Perform Arnaud assessment every shift  -Clean and moisturize skin every  day  -Inspect skin when repositioning, toileting, and assisting with ADLS  -Assess extremities for adequate circulation and sensation     Bed Management:  -Have minimal linens on bed & keep smooth, unwrinkled  -Change linens as needed when moist or perspiring  -Avoid sitting or lying in one position for more than 2 hours while in bed  -Keep HOB at 30 degrees     Toileting:  -Offer bedside commode  -Assess for incontinence every shift  -Use incontinent care products after each incontinent episode such as faom    Activity:  -Mobilize patient 3 times a day  -Encourage activity and walks on unit  -Encourage or provide ROM exercises   -Turn and reposition patient every 8 Hours  -Use appropriate equipment to lift or move patient in bed  -Instruct/ Assist with weight shifting every 2 when out of bed in chair  -Consider limitation of chair time 120 hour intervals    Skin Care:  -Avoid use of baby powder, tape, friction and shearing, hot water or constrictive clothing  -Relieve pressure over bony prominences using wedges  -Do not massage red bony areas    Next Steps:  -Teach patient strategies to minimize risks such as fall   -Consider consults to  interdisciplinary teams such as PT/OT  Outcome: Progressing  Goal: Incision(s), wounds(s) or drain site(s) healing without S/S of infection  Description: INTERVENTIONS  - Assess and document dressing, incision, wound bed, drain sites and surrounding tissue  - Provide patient and family education  - Perform skin care/dressing changes as ordered  Outcome: Progressing  Goal: Pressure injury heals and does not worsen  Description: Interventions:  - Implement low air loss mattress or specialty surface (Criteria met)  - Apply silicone foam dressing  - Instruct/assist with weight shifting every  minutes when in chair   - Limit chair time to  hour intervals  - Use special pressure reducing interventions such as  when in chair   - Apply fecal or urinary incontinence containment device   -  Perform passive or active ROM every   - Turn and reposition patient & offload bony prominences every  hours   - Utilize friction reducing device or surface for transfers   - Consider consults to  interdisciplinary teams such as   - Use incontinent care products after each incontinent episode such as   - Consider nutrition services referral as needed  Outcome: Progressing     Problem: MUSCULOSKELETAL - ADULT  Goal: Maintain or return mobility to safest level of function  Description: INTERVENTIONS:  - Assess patient's ability to carry out ADLs; assess patient's baseline for ADL function and identify physical deficits which impact ability to perform ADLs (bathing, care of mouth/teeth, toileting, grooming, dressing, etc.)  - Assess/evaluate cause of self-care deficits   - Assess range of motion  - Assess patient's mobility  - Assess patient's need for assistive devices and provide as appropriate  - Encourage maximum independence but intervene and supervise when necessary  - Involve family in performance of ADLs  - Assess for home care needs following discharge   - Consider OT consult to assist with ADL evaluation and planning for discharge  - Provide patient education as appropriate  Outcome: Progressing  Goal: Maintain proper alignment of affected body part  Description: INTERVENTIONS:  - Support, maintain and protect limb and body alignment  - Provide patient/ family with appropriate education  Outcome: Progressing     Problem: Prexisting or High Potential for Compromised Skin Integrity  Goal: Skin integrity is maintained or improved  Description: INTERVENTIONS:  - Identify patients at risk for skin breakdown  - Assess and monitor skin integrity  - Assess and monitor nutrition and hydration status  - Monitor labs   - Assess for incontinence   - Turn and reposition patient  - Assist with mobility/ambulation  - Relieve pressure over bony prominences  - Avoid friction and shearing  - Provide appropriate hygiene as  needed including keeping skin clean and dry  - Evaluate need for skin moisturizer/barrier cream  - Collaborate with interdisciplinary team   - Patient/family teaching  - Consider wound care consult   Outcome: Progressing

## 2024-02-11 NOTE — PLAN OF CARE
Problem: PAIN - ADULT  Goal: Verbalizes/displays adequate comfort level or baseline comfort level  Description: Interventions:  - Encourage patient to monitor pain and request assistance  - Assess pain using appropriate pain scale  - Administer analgesics based on type and severity of pain and evaluate response  - Implement non-pharmacological measures as appropriate and evaluate response  - Consider cultural and social influences on pain and pain management  - Notify physician/advanced practitioner if interventions unsuccessful or patient reports new pain  Outcome: Progressing     Problem: INFECTION - ADULT  Goal: Absence or prevention of progression during hospitalization  Description: INTERVENTIONS:  - Assess and monitor for signs and symptoms of infection  - Monitor lab/diagnostic results  - Monitor all insertion sites, i.e. indwelling lines, tubes, and drains  - Monitor endotracheal if appropriate and nasal secretions for changes in amount and color  - [place] appropriate cooling/warming therapies per order  - Administer medications as ordered  - Instruct and encourage patient and family to use good hand hygiene technique  - Identify and instruct in appropriate isolation precautions for identified infection/condition  Outcome: Progressing     Problem: Knowledge Deficit  Goal: Patient/family/caregiver demonstrates understanding of disease process, treatment plan, medications, and discharge instructions  Description: Complete learning assessment and assess knowledge base.  Interventions:  - Provide teaching at level of understanding  - Provide teaching via preferred learning methods  Outcome: Progressing     Problem: Nutrition/Hydration-ADULT  Goal: Nutrient/Hydration intake appropriate for improving, restoring or maintaining nutritional needs  Description: Monitor and assess patient's nutrition/hydration status for malnutrition. Collaborate with interdisciplinary team and initiate plan and interventions as  Patient Name: Chioma Green  : 1955    MRN: [id number]                              CMS/[facility])    • ordered.  Monitor patient's weight and dietary intake as ordered or per policy. Utilize nutrition screening tool and intervene as necessary. Determine patient's food preferences and provide high-protein, high-caloric foods as appropriate.     INTERVENTIONS:  - Monitor oral intake, urinary output, labs, and treatment plans  - Assess nutrition and hydration status and recommend course of action  - Evaluate amount of meals eaten  - Assist patient with eating if necessary   - Allow adequate time for meals  - Recommend/ encourage appropriate diets, oral nutritional supplements, and vitamin/mineral supplements  - Order, calculate, and assess calorie counts as needed  - Recommend, monitor, and adjust tube feedings and TPN/PPN based on assessed needs  - Assess need for intravenous fluids  - Provide specific nutrition/hydration education as appropriate  - Include patient/family/caregiver in decisions related to nutrition  Outcome: Progressing     Problem: METABOLIC, FLUID AND ELECTROLYTES - ADULT  Goal: Electrolytes maintained within normal limits  Description: INTERVENTIONS:  - Monitor labs and assess patient for signs and symptoms of electrolyte imbalances  - Administer electrolyte replacement as ordered  - Monitor response to electrolyte replacements, including repeat lab results as appropriate  - Instruct patient on fluid and nutrition as appropriate  Outcome: Progressing     Problem: RESPIRATORY - ADULT  Goal: Achieves optimal ventilation and oxygenation  Description: INTERVENTIONS:  - Assess for changes in respiratory status  - Assess for changes in mentation and behavior  - Position to facilitate oxygenation and minimize respiratory effort  - Oxygen administered by appropriate delivery if ordered  - Initiate smoking cessation education as indicated  - Encourage broncho-pulmonary hygiene including cough, deep breathe, Incentive Spirometry  - Assess the need for suctioning and aspirate as needed  - Assess and instruct  to report SOB or any respiratory difficulty  - Respiratory Therapy support as indicated  Outcome: Progressing      Name Provider Type    Madison Marmolejo, Madison Ordoñez, PT Physical Therapist PT    GLORIA 08/30/18 -  Jessenia Crow, PT Madison Ordoñez, PT GP 1    [phone] HC Rosaura Ordoñez PT  8/12/2020

## 2024-02-11 NOTE — PROGRESS NOTES
[facility]  Progress Note  Name: Constanza Zhu I  MRN: [id number]  Unit/Bed#: -01 I Date of Admission: 2/7/2024   Date of Service: 2/11/2024 I Hospital Day: 2    Assessment/Plan   * COVID  Assessment & Plan  Patient admitted in October 2023 for sepsis due to COVID, given IV remdesivir  Patient noted to have a fever of 101 at home today and was given 2 Tylenol,  Recurrent fever last evening of 100.6  Patient remains on room air  Today day 4 remdesivir    AMS (altered mental status)  Assessment & Plan  Patient with history of NPH, hypertension induced encephalopathy, recent diagnosis of COVID was found to have increased lethargy and confusion around 3 PM this afternoon when his wife came to check on him.  Baseline GCS is typically greater than 12 given prior history of multiple strokes.  GCS at time of admission 13, patient is only oriented to self  CT head and neck:  No changes from prior imaging studies, no intracranial hemorrhage or mass, stable old infarcts  Altered mental status likely in the setting of COVID infection, mental status returned to baseline  Continue to monitor neuro status  Case discussed with patient's wife mental status at baseline  Patient is home by himself for most of the day as patient's wife works.  She does not think he strong enough or able to care for himself at home by himself.  Monitor intensity of needs as noted by physical therapy.  Patient was with require short-term rehab this was discussed with the wife at length and she is agreeable.  Will discuss with case management    History of stroke  Assessment & Plan  Patient with history of bilateral MCA strokes, with right stenosis and left stenosis with left ICA stent placement in May 2023  Baseline GCS 12, per EMS  GCS at time of admission 13, eye-opening to sound, confused, obeying commands  Follows with neurology outpatient  Per last neurology note BP goal of <130/80  Continue aspirin, Plavix,  statin    Focal epilepsy (HCC)  Assessment & Plan  Patient with history of staring episodes, felt to be absence seizure's  Follows with neurology as outpatient  No acute events  continue Keppra 500 Mg    Primary hypertension  Assessment & Plan  Continue losartan and metoprolol  Patient hypertensive on admission, as needed labetalol IV for SBP greater than 180  Blood pressure control remains excellent    History of prediabetes  Assessment & Plan  Continue carb controlled diet and monitor sugars with BMP  Sugars remained stable    GERD (gastroesophageal reflux disease)  Assessment & Plan  Continue Protonix 20 mg    Hypokalemia  Assessment & Plan  Patient with potassium of 3.2 on admission  Received 40 mill equivalents potassium in the ED  Hold home HTZ 12.5mg  Hypokalemia resolved        Generalized weakness  Assessment & Plan  Patient with a history of generalized weakness, prior admission discharged to [facility].  Patient arrives from home.  Typically walks with a walker, however was too weak to get out of bed today  PT/OT- recommended str   Weakness likely due to COVID infection  Patient will need placement to short-term rehab             Subjective:  The patient is alert and communicative.  He is feeling okay.  He denies chest pain or shortness of breath.  He has no abdominal pain, nausea, or vomiting.  He ate well for breakfast.  He did not sleep very well.  He does not like being in the hospital.    Physical Exam:   Temp:  [98.2 °F (36.8 °C)-98.4 °F (36.9 °C)] 98.2 °F (36.8 °C)  HR:  [74-81] 80  Resp:  [19] 19  BP: (111-160)/(62-74) 157/73    Gen: Well-developed, well-nourished, in no distress.  Neck: Supple.  No lymphadenopathy or goiter.  Heart: Regular rhythm.  I heard no murmur, gallop, or rub.  Lungs: Clear to auscultation and percussion.  No wheezing, rales, or rhonchi.  Abd: Soft with active bowel sounds.  No mass, tenderness, organomegaly.  Extremities: No clubbing, cyanosis, or edema.  No calf  tenderness.  Neuro: Alert and conversant.  Moves all limbs.  Skin: Warm and dry.      LABS:   CMP:   Lab Results   Component Value Date    SODIUM 139 02/11/2024    K 3.5 02/11/2024     02/11/2024    CO2 24 02/11/2024    BUN 29 (H) 02/11/2024    CREATININE 0.90 02/11/2024    CALCIUM 9.2 02/11/2024    EGFR 89 02/11/2024               VTE Pharmacologic Prophylaxis: Enoxaparin (Lovenox)  VTE Mechanical Prophylaxis: sequential compression device

## 2024-02-11 NOTE — ASSESSMENT & PLAN NOTE
Patient admitted in October 2023 for sepsis due to COVID, given IV remdesivir  Patient noted to have a fever of 101 at home today and was given 2 Tylenol,  Recurrent fever last evening of 100.6  Patient remains on room air  Today day 4 remdesivir

## 2024-02-11 NOTE — ASSESSMENT & PLAN NOTE
Patient with a history of generalized weakness, prior admission discharged to [facility].  Patient arrives from home.  Typically walks with a walker, however was too weak to get out of bed today  PT/OT- recommended str   Weakness likely due to COVID infection  Patient will need placement to short-term rehab

## 2024-02-11 NOTE — ASSESSMENT & PLAN NOTE
Patient with potassium of 3.2 on admission  Received 40 mill equivalents potassium in the ED  Hold home HTZ 12.5mg  Hypokalemia resolved

## 2024-02-12 PROBLEM — E87.6 HYPOKALEMIA: Status: RESOLVED | Noted: 2023-05-12 | Resolved: 2024-02-12

## 2024-02-12 PROCEDURE — 99232 SBSQ HOSP IP/OBS MODERATE 35: CPT | Performed by: INTERNAL MEDICINE

## 2024-02-12 PROCEDURE — 97116 GAIT TRAINING THERAPY: CPT

## 2024-02-12 PROCEDURE — 97535 SELF CARE MNGMENT TRAINING: CPT | Performed by: OCCUPATIONAL THERAPIST

## 2024-02-12 RX ORDER — AMOXICILLIN 250 MG
1 CAPSULE ORAL 2 TIMES DAILY
Status: DISCONTINUED | OUTPATIENT
Start: 2024-02-12 | End: 2024-02-19 | Stop reason: HOSPADM

## 2024-02-12 RX ORDER — POLYETHYLENE GLYCOL 3350 17 G/17G
17 POWDER, FOR SOLUTION ORAL DAILY
Status: DISCONTINUED | OUTPATIENT
Start: 2024-02-12 | End: 2024-02-19 | Stop reason: HOSPADM

## 2024-02-12 RX ADMIN — SENNOSIDES AND DOCUSATE SODIUM 1 TABLET: 8.6; 5 TABLET ORAL at 09:14

## 2024-02-12 RX ADMIN — TICAGRELOR 90 MG: 90 TABLET ORAL at 21:30

## 2024-02-12 RX ADMIN — POLYETHYLENE GLYCOL 3350 17 G: 17 POWDER, FOR SOLUTION ORAL at 09:18

## 2024-02-12 RX ADMIN — ATORVASTATIN CALCIUM 40 MG: 40 TABLET, FILM COATED ORAL at 09:15

## 2024-02-12 RX ADMIN — LEVETIRACETAM 500 MG: 500 TABLET, FILM COATED ORAL at 21:29

## 2024-02-12 RX ADMIN — TICAGRELOR 90 MG: 90 TABLET ORAL at 09:14

## 2024-02-12 RX ADMIN — TRAZODONE HYDROCHLORIDE 100 MG: 50 TABLET ORAL at 21:29

## 2024-02-12 RX ADMIN — ENOXAPARIN SODIUM 40 MG: 100 INJECTION SUBCUTANEOUS at 09:18

## 2024-02-12 RX ADMIN — DONEPEZIL HYDROCHLORIDE 10 MG: 5 TABLET ORAL at 09:14

## 2024-02-12 RX ADMIN — PANTOPRAZOLE SODIUM 20 MG: 20 TABLET, DELAYED RELEASE ORAL at 05:43

## 2024-02-12 RX ADMIN — LOSARTAN POTASSIUM 50 MG: 50 TABLET, FILM COATED ORAL at 09:18

## 2024-02-12 RX ADMIN — REMDESIVIR 100 MG: 100 INJECTION, POWDER, LYOPHILIZED, FOR SOLUTION INTRAVENOUS at 05:43

## 2024-02-12 RX ADMIN — TAMSULOSIN HYDROCHLORIDE 0.4 MG: 0.4 CAPSULE ORAL at 17:08

## 2024-02-12 RX ADMIN — Medication 5 MG: at 21:29

## 2024-02-12 RX ADMIN — METOPROLOL SUCCINATE 100 MG: 50 TABLET, EXTENDED RELEASE ORAL at 09:15

## 2024-02-12 RX ADMIN — LEVETIRACETAM 500 MG: 500 TABLET, FILM COATED ORAL at 09:18

## 2024-02-12 RX ADMIN — GLYCERIN 1 DROP: .002; .002; .01 SOLUTION/ DROPS OPHTHALMIC at 18:15

## 2024-02-12 RX ADMIN — ASPIRIN 81 MG: 81 TABLET, COATED ORAL at 09:14

## 2024-02-12 NOTE — PLAN OF CARE
Problem: PAIN - ADULT  Goal: Verbalizes/displays adequate comfort level or baseline comfort level  Description: Interventions:  - Encourage patient to monitor pain and request assistance  - Assess pain using appropriate pain scale  - Administer analgesics based on type and severity of pain and evaluate response  - Implement non-pharmacological measures as appropriate and evaluate response  - Consider cultural and social influences on pain and pain management  - Notify physician/advanced practitioner if interventions unsuccessful or patient reports new pain  Outcome: Progressing     Problem: INFECTION - ADULT  Goal: Absence or prevention of progression during hospitalization  Description: INTERVENTIONS:  - Assess and monitor for signs and symptoms of infection  - Monitor lab/diagnostic results  - Monitor all insertion sites, i.e. indwelling lines, tubes, and drains  - Monitor endotracheal if appropriate and nasal secretions for changes in amount and color  - [place] appropriate cooling/warming therapies per order  - Administer medications as ordered  - Instruct and encourage patient and family to use good hand hygiene technique  - Identify and instruct in appropriate isolation precautions for identified infection/condition  Outcome: Progressing     Problem: CARDIOVASCULAR - ADULT  Goal: Maintains optimal cardiac output and hemodynamic stability  Description: INTERVENTIONS:  - Monitor I/O, vital signs and rhythm  - Monitor for S/S and trends of decreased cardiac output  - Administer and titrate ordered vasoactive medications to optimize hemodynamic stability  - Assess quality of pulses, skin color and temperature  - Assess for signs of decreased coronary artery perfusion  - Instruct patient to report change in severity of symptoms  Outcome: Progressing     Problem: METABOLIC, FLUID AND ELECTROLYTES - ADULT  Goal: Fluid balance maintained  Description: INTERVENTIONS:  - Monitor labs   - Monitor I/O and WT  -  Instruct patient on fluid and nutrition as appropriate  - Assess for signs & symptoms of volume excess or deficit  Outcome: Progressing     Problem: METABOLIC, FLUID AND ELECTROLYTES - ADULT  Goal: Electrolytes maintained within normal limits  Description: INTERVENTIONS:  - Monitor labs and assess patient for signs and symptoms of electrolyte imbalances  - Administer electrolyte replacement as ordered  - Monitor response to electrolyte replacements, including repeat lab results as appropriate  - Instruct patient on fluid and nutrition as appropriate  Outcome: Progressing

## 2024-02-12 NOTE — ASSESSMENT & PLAN NOTE
Continue losartan and metoprolol  Patient hypertensive on admission, as needed labetalol IV for SBP greater than 180  Blood pressure control remains stable

## 2024-02-12 NOTE — PLAN OF CARE
Problem: PHYSICAL THERAPY ADULT  Goal: Performs mobility at highest level of function for planned discharge setting.  See evaluation for individualized goals.  Description: Treatment/Interventions: Functional transfer training, LE strengthening/ROM, Elevations, Therapeutic exercise, Endurance training, Cognitive reorientation, Patient/family training, Equipment eval/education, Bed mobility, Gait training, Compensatory technique education, Spoke to nursing, Spoke to case management    See flowsheet documentation for full assessment, interventions and recommendations.  2/12/2024 1206 by Valencia Lee PT  Note: Prognosis: Fair  Problem List: Decreased strength, Decreased endurance, Impaired balance, Decreased mobility, Decreased cognition, Impaired judgement, Decreased safety awareness, Decreased skin integrity  Assessment: Pt has improved with mobility from previous PT intervention, with pt being able to perform GT with use of RW 24 feet total S level of A, TT and bed mobility S level of A. Limited ambulation distance due to pt being (+)COVID and airborne/contact precautions. Observed inc SOB and fatigue following ambulation distance with pt needing A for manuvering RW in narrow areas and during turns. Pt would cont to benefit from skilled inpt PT services to maximize functional independence and to dec caregiver burden upon being DC from the hospital.  Barriers to Discharge: Decreased caregiver support     Rehab Resource Intensity Level, PT: III (Minimum Resource Intensity) (HHPT with a walker)    See flowsheet documentation for full assessment.     2/12/2024 1206 by Valencia Lee, MANNY  Note: Prognosis: Fair  Problem List: Decreased strength, Decreased endurance, Impaired balance, Decreased mobility, Decreased cognition, Impaired judgement, Decreased safety awareness, Decreased skin integrity  Assessment: Pt has improved with mobility from previous PT intervention, with pt being able to perform GT with  use of RW 24 feet total S level of A, TT and bed mobility S level of A. Limited ambulation distance due to pt being (+)COVID and airborne/contact precautions. Observed inc SOB and fatigue following ambulation distance with pt needing A for manuvering RW in narrow areas and during turns. Pt would cont to benefit from skilled inpt PT services to maximize functional independence and to dec caregiver burden upon being DC from the hospital.  Barriers to Discharge: Decreased caregiver support     Rehab Resource Intensity Level, PT: III (Minimum Resource Intensity) (HHPT with a walker)    See flowsheet documentation for full assessment.

## 2024-02-12 NOTE — PLAN OF CARE
Problem: PAIN - ADULT  Goal: Verbalizes/displays adequate comfort level or baseline comfort level  Description: Interventions:  - Encourage patient to monitor pain and request assistance  - Assess pain using appropriate pain scale  - Administer analgesics based on type and severity of pain and evaluate response  - Implement non-pharmacological measures as appropriate and evaluate response  - Consider cultural and social influences on pain and pain management  - Notify physician/advanced practitioner if interventions unsuccessful or patient reports new pain  Outcome: Progressing     Problem: INFECTION - ADULT  Goal: Absence or prevention of progression during hospitalization  Description: INTERVENTIONS:  - Assess and monitor for signs and symptoms of infection  - Monitor lab/diagnostic results  - Monitor all insertion sites, i.e. indwelling lines, tubes, and drains  - Monitor endotracheal if appropriate and nasal secretions for changes in amount and color  - [place] appropriate cooling/warming therapies per order  - Administer medications as ordered  - Instruct and encourage patient and family to use good hand hygiene technique  - Identify and instruct in appropriate isolation precautions for identified infection/condition  Outcome: Progressing

## 2024-02-12 NOTE — ASSESSMENT & PLAN NOTE
Patient with history of NPH, hypertension induced encephalopathy, recent diagnosis of COVID was found to have increased lethargy and confusion around 3 PM this afternoon when his wife came to check on him.  Baseline GCS is typically greater than 12 given prior history of multiple strokes.  GCS at time of admission 13, patient is only oriented to self  CT head and neck:  No changes from prior imaging studies, no intracranial hemorrhage or mass, stable old infarcts  Altered mental status likely in the setting of COVID infection, mental status returned to baseline  Continue to monitor neuro status  Currently at baseline

## 2024-02-12 NOTE — PLAN OF CARE
Problem: OCCUPATIONAL THERAPY ADULT  Goal: Performs self-care activities at highest level of function for planned discharge setting.  See evaluation for individualized goals.  Description: Treatment Interventions: ADL retraining, Functional transfer training, Visual perceptual retraining, UE strengthening/ROM, Endurance training, Cognitive reorientation, Patient/family training, Continued evaluation, Energy conservation, Activityengagement          See flowsheet documentation for full assessment, interventions and recommendations.   Outcome: Progressing  Note: Limitation: Decreased ADL status, Decreased UE strength, Decreased Safe judgement during ADL, Decreased cognition, Decreased endurance, Visual deficit, Decreased self-care trans  Prognosis: Good  Assessment: Pt received sitting upright in side chair; agreeable for skilled OT tx. Pt participated in LBD of socks. Pt required Max A for doff 2* able to bring leg up and reach to doff sock off heel, but then pt noted with inc fatigue and difficulty bending forward and bringing leg up- unable to complete. Pt required total A donnign sock 2* fatigue and pt reporting unable to do even with encouregement. pt required Min A STS for STS for optimal safety and Min A functional mob with RW 2* physical assistance with maneivering RW and verbal cues for bigger steps. Pt deferred using toilet and sinkside grooming of brushing teeth. Pt wanting to sit back into chair, Min A for sitting for optimal safety. All needs in reach and call bell in side chair. Pt with dec ADLs, would recommend continued STR.     Rehab Resource Intensity Level, OT: II (Moderate Resource Intensity)

## 2024-02-12 NOTE — NUTRITION
Calorie Counts: Day 1: 140 kcal, 0 gm protein Meets 6% of estimated needs, 0 % protein needs.   Day 2: 445 kcal, 19.5 gm protein. Meets 20% of estimated needs and 21% of estimated protein needs.   Day 3 (2 meals so far): 390 kcal, 21 gm protein.     Some improvement over last 2 days of po intake overall appears to be suboptimal to meet estiamted needs.   No documented supplement intake noted, will adjust 1x day supplement to TID and see if pt accepts better at another mealtime.     Interventions:   Per RD protocol changed supplements to BID

## 2024-02-12 NOTE — ASSESSMENT & PLAN NOTE
Patient admitted in October 2023 for sepsis due to COVID, given IV remdesivir  Patient noted to have a fever of 101 at home today and was given 2 Tylenol,  Recurrent fever last evening of 100.6  Patient remains on room air  Completed remdesivir

## 2024-02-12 NOTE — PHYSICAL THERAPY NOTE
"   Physical Therapy Treatment Session:       02/12/24 1100   PT Last Visit   PT Visit Date 02/12/24   Note Type   Note Type Treatment for insurance authorization   Pain Assessment   Pain Assessment Tool 0-10   Pain Score No Pain   Restrictions/Precautions   Other Precautions Contact/isolation;Airborne/isolation;Cognitive;Chair Alarm;Bed Alarm;Multiple lines;Telemetry;Fall Risk;Hard of hearing   General   Chart Reviewed Yes   Family/Caregiver Present No   Cognition   Overall Cognitive Status Impaired   Arousal/Participation Cooperative   Attention Attends with cues to redirect   Orientation Level Oriented to person;Oriented to place;Oriented to time;Disoriented to situation   Following Commands Follows one step commands with increased time or repetition   Comments Pt reports having personal HHA 3 days per week for 2 hours each day. Pt reports personal HHA assist with ADLs and IADLs as needed   Subjective   Subjective pt supine in bed resting comfortably; pt willing and agreeable to work with PT and to participate in therapy intervention; \"I can do this\".   Bed Mobility   Supine to Sit 5  Supervision   Additional items Assist x 1;Bedrails;Verbal cues   Transfers   Sit to Stand 5  Supervision   Additional items Assist x 1;Bedrails;Impulsive;Verbal cues   Stand to Sit 5  Supervision   Additional items Assist x 1;Armrests;Impulsive;Verbal cues  (education to dec premature stand->sit transfers with use of RW in front at all times)   Ambulation/Elevation   Gait pattern Narrow NEREIDA;Forward Flexion;Shuffling;Inconsistent joel;Foward flexed;Short stride;Ataxia   Gait Assistance 4  Minimal assist   Additional items Assist x 1;Verbal cues  (for manuvering of RW in narrow areas and to gait sequence with use of walker)   Assistive Device Rolling walker   Distance 25 feet with use of RW; limited ambulation distance due to airborne/contact precautions with being (+)COVID   Balance   Static Sitting Fair +  (chair alarm intact " prior to leaving pts room)   Dynamic Sitting Fair   Static Standing Fair   Dynamic Standing Fair   Ambulatory Fair   Endurance Deficit   Endurance Deficit Yes   Endurance Deficit Description (+)COVID, limited to hospital room ambulation distance due to airborne/contact precautions   Activity Tolerance   Activity Tolerance Patient limited by fatigue  (fair->observed SOB and fatigue following hospital room ambulation distance)   Medical Staff Made Aware tiger text CM (Liz)   Nurse Made Aware yes   Assessment   Prognosis Fair   Problem List Decreased strength;Decreased endurance;Impaired balance;Decreased mobility;Decreased cognition;Impaired judgement;Decreased safety awareness;Decreased skin integrity   Assessment Pt has improved with mobility from previous PT intervention, with pt being able to perform GT with use of RW 24 feet total S level of A, TT and bed mobility S level of A. Limited ambulation distance due to pt being (+)COVID and airborne/contact precautions. Observed inc SOB and fatigue following ambulation distance with pt needing A for manuvering RW in narrow areas and during turns. Pt would cont to benefit from skilled inpt PT services to maximize functional independence and to dec caregiver burden upon being DC from the hospital.   Goals   Patient Goals to get going and to go home   STG Expiration Date 02/22/24   PT Treatment Day 1   Plan   Treatment/Interventions Functional transfer training;LE strengthening/ROM;Elevations;Therapeutic exercise;Endurance training;Patient/family training;Equipment eval/education;Bed mobility;Gait training;Continued evaluation;Spoke to nursing;Spoke to case management   Progress Progressing toward goals   PT Frequency 3-5x/wk   Discharge Recommendation   Rehab Resource Intensity Level, PT III (Minimum Resource Intensity)  (HHPT with a walker)   AM-PAC Basic Mobility Inpatient   Turning in Flat Bed Without Bedrails 4   Lying on Back to Sitting on Edge of Flat Bed  Without Bedrails 4   Moving Bed to Chair 3   Standing Up From Chair Using Arms 3   Walk in Room 3   Climb 3-5 Stairs With Railing 3   Basic Mobility Inpatient Raw Score 20   Basic Mobility Standardized Score 43.99   Highest Level Of Mobility   -HLM Goal 6: Walk 10 steps or more   JH-HLM Achieved 6: Walk 10 steps or more

## 2024-02-12 NOTE — PROGRESS NOTES
Formerly [facility], later [facility]  Progress Note  Name: Constanza Zhu I  MRN: [id number]  Unit/Bed#: -01 I Date of Admission: 2/7/2024   Date of Service: 2/12/2024 I Hospital Day: 3    Assessment/Plan   AMS (altered mental status)  Assessment & Plan  Patient with history of NPH, hypertension induced encephalopathy, recent diagnosis of COVID was found to have increased lethargy and confusion around 3 PM this afternoon when his wife came to check on him.  Baseline GCS is typically greater than 12 given prior history of multiple strokes.  GCS at time of admission 13, patient is only oriented to self  CT head and neck:  No changes from prior imaging studies, no intracranial hemorrhage or mass, stable old infarcts  Altered mental status likely in the setting of COVID infection, mental status returned to baseline  Continue to monitor neuro status  Currently at baseline     Generalized weakness  Assessment & Plan  Patient with a history of generalized weakness, prior admission discharged to [facility].  Patient arrives from home.  Typically walks with a walker  PT/OT- recommended str   PT/OT reevaluated.  PT recommended level 3, OT recommended level 2.  Wife concerned about patient being discharged home because he is not as his baseline ambulatory wise.  She can't supervise him at home because she is working.  Wife requested patient to be discharged in a rehab to get his strength back and be able to be more independent and then discharged home.    CM placed referrals for rehab    Focal epilepsy (HCC)  Assessment & Plan  Patient with history of staring episodes, felt to be absence seizure's  Follows with neurology as outpatient  No acute events  continue Keppra 500 Mg    History of prediabetes  Assessment & Plan  Continue carb controlled diet and monitor sugars with BMP  Sugars remained stable    GERD (gastroesophageal reflux disease)  Assessment & Plan  Continue Protonix 20 mg    Primary  hypertension  Assessment & Plan  Continue losartan and metoprolol  Patient hypertensive on admission, as needed labetalol IV for SBP greater than 180  Blood pressure control remains stable    History of stroke  Assessment & Plan  Patient with history of bilateral MCA strokes, with right stenosis and left stenosis with left ICA stent placement in May 2023  Baseline GCS 12, per EMS  GCS at time of admission 13, eye-opening to sound, confused, obeying commands  Follows with neurology outpatient  Per last neurology note BP goal of <130/80  Continue aspirin, Plavix, statin    * COVID  Assessment & Plan  Patient admitted in October 2023 for sepsis due to COVID, given IV remdesivir  Patient noted to have a fever of 101 at home today and was given 2 Tylenol,  Recurrent fever last evening of 100.6  Patient remains on room air  Completed remdesivir    Hypokalemia-resolved as of 2/12/2024  Assessment & Plan  Patient with potassium of 3.2 on admission  Received 40 mill equivalents potassium in the ED  Hold home HTZ 12.5mg  Hypokalemia resolved                 VTE Pharmacologic Prophylaxis: VTE Score: 8 High Risk (Score >/= 5) - Pharmacological DVT Prophylaxis Ordered: enoxaparin (Lovenox). Sequential Compression Devices Ordered.    Mobility:   Basic Mobility Inpatient Raw Score: 20  JH-HLM Goal: 6: Walk 10 steps or more  JH-HLM Achieved: 6: Walk 10 steps or more  HLM Goal NOT achieved. Continue with multidisciplinary rounding and encourage appropriate mobility to improve upon HLM goals.    Patient Centered Rounds: I performed bedside rounds with nursing staff today.   Discussions with Specialists or Other Care Team Provider: cm, pt, ot    Education and Discussions with Family / Patient: Updated  (wife) via phone.    Total Time Spent on Date of Encounter in care of patient: 60 mins. This time was spent on one or more of the following: performing physical exam; counseling and coordination of care; obtaining or  reviewing history; documenting in the medical record; reviewing/ordering tests, medications or procedures; communicating with other healthcare professionals and discussing with patient's family/caregivers.    Current Length of Stay: 3 day(s)  Current Patient Status: Inpatient   Certification Statement: The patient will continue to require additional inpatient hospital stay due to pending placement   Discharge Plan: Anticipate discharge tomorrow to rehab facility.    Code Status: Level 1 - Full Code    Subjective:     No complaint s    Objective:     Vitals:   Temp (24hrs), Av.2 °F (37.3 °C), Min:98.4 °F (36.9 °C), Max:100.4 °F (38 °C)    Temp:  [98.4 °F (36.9 °C)-100.4 °F (38 °C)] 98.4 °F (36.9 °C)  HR:  [76] 76  Resp:  [16-21] 16  BP: (131-140)/(65-74) 140/74  SpO2:  [96 %-98 %] 98 %  Body mass index is 27.55 kg/m².     Input and Output Summary (last 24 hours):   No intake or output data in the 24 hours ending 24 5127    Physical Exam:   Physical Exam  Vitals and nursing note reviewed.   Constitutional:       General: He is not in acute distress.     Appearance: He is not diaphoretic.   HENT:      Head: Normocephalic.   Eyes:      General: No scleral icterus.        Right eye: No discharge.         Left eye: No discharge.   Cardiovascular:      Rate and Rhythm: Normal rate and regular rhythm.   Pulmonary:      Effort: Pulmonary effort is normal. No respiratory distress.      Breath sounds: Normal breath sounds. No wheezing, rhonchi or rales.   Abdominal:      General: There is no distension.      Palpations: Abdomen is soft.      Tenderness: There is no abdominal tenderness. There is no guarding or rebound.   Musculoskeletal:      Cervical back: Normal range of motion.      Right lower leg: No edema.      Left lower leg: No edema.   Skin:     General: Skin is warm.   Neurological:      Mental Status: He is alert.      Comments: Oriented to self and place   Psychiatric:         Mood and Affect: Mood  normal.         Behavior: Behavior normal.          Additional Data:     Labs:  Results from last 7 days   Lab Units 02/10/24  0358   WBC Thousand/uL 10.76*   HEMOGLOBIN g/dL 13.7   HEMATOCRIT % 41.3   PLATELETS Thousands/uL 185   NEUTROS PCT % 67   LYMPHS PCT % 16   MONOS PCT % 13*   EOS PCT % 2     Results from last 7 days   Lab Units 02/11/24  0426 02/10/24  0358   SODIUM mmol/L 139 134*   POTASSIUM mmol/L 3.5 3.7   CHLORIDE mmol/L 107 103   CO2 mmol/L 24 21   BUN mg/dL 29* 26*   CREATININE mg/dL 0.90 0.93   ANION GAP mmol/L 8 10   CALCIUM mg/dL 9.2 8.9   ALBUMIN g/dL  --  3.7   TOTAL BILIRUBIN mg/dL  --  0.63   ALK PHOS U/L  --  66   ALT U/L  --  15   AST U/L  --  21   GLUCOSE RANDOM mg/dL 104 96                 Results from last 7 days   Lab Units 02/08/24  0708   PROCALCITONIN ng/ml 0.06       Lines/Drains:  Invasive Devices       Peripheral Intravenous Line  Duration             Peripheral IV 02/09/24 Right;Ventral (anterior) Forearm 3 days              Drain  Duration             External Urinary Catheter Small 3 days                          Imaging: Reviewed radiology reports from this admission including: chest xray    Recent Cultures (last 7 days):         Last 24 Hours Medication List:   Current Facility-Administered Medications   Medication Dose Route Frequency Provider Last Rate    Artificial Tears  1 drop Both Eyes Q4H PRN Spenser Lopez MD      aspirin  81 mg Oral Daily Margarita Torres PA-C      atorvastatin  40 mg Oral Daily With Breakfast Margarita Torres PA-C      baclofen  5 mg Oral BID PRN Margarita Torres PA-C      donepezil  10 mg Oral Daily Margarita Torres PA-C      enoxaparin  40 mg Subcutaneous Daily Margarita Torres PA-C      levETIRAcetam  500 mg Oral Q12H JOSE Margarita Torres PA-C      losartan  50 mg Oral Daily Margarita Torres PA-C      melatonin  5 mg Oral HS Margarita Torres PA-C      metoprolol succinate  100 mg Oral Daily Margarita Torres PA-C       pantoprazole  20 mg Oral Early Morning Margarita Torres PA-C      polyethylene glycol  17 g Oral Daily Pilar Glaser MD      senna-docusate sodium  1 tablet Oral BID Pilar Glaser MD      tamsulosin  0.4 mg Oral Daily With Dinner Margarita Torres PA-C      ticagrelor  90 mg Oral Q12H JOSE Margarita Torres PA-C      traZODone  100 mg Oral HS Margarita Torres PA-C          Today, Patient Was Seen By: Pilar Glaser MD    **Please Note: This note may have been constructed using a voice recognition system.**

## 2024-02-12 NOTE — OCCUPATIONAL THERAPY NOTE
Occupational Therapy Progress Note     Patient Name: Constanza Zhu  Today's Date: 2/12/2024  Problem List  Principal Problem:    COVID  Active Problems:    History of stroke    Primary hypertension    GERD (gastroesophageal reflux disease)    History of prediabetes    Hypokalemia    Focal epilepsy (HCC)    Generalized weakness    AMS (altered mental status)        02/12/24 1121   OT Last Visit   OT Visit Date 02/12/24   Note Type   Note Type Treatment for insurance authorization   Pain Assessment   Pain Assessment Tool 0-10   Pain Score No Pain   Restrictions/Precautions   Other Precautions Contact/isolation;Chair Alarm;Bed Alarm;Telemetry;Fall Risk  (+COVID)   ADL   LB Dressing Assistance 2  Maximal Assistance   LB Dressing Deficit Requires assistive device for steadying;Verbal cueing;Increased time to complete;Don/doff R sock;Don/doff L sock;Supervision/safety;Steadying;Setup  (Able to bring sock over heal, but then noted with dec endurance and struggling to complete; gave up and did not wish to try more and complete.)   Transfers   Sit to Stand 4  Minimal assistance   Additional items Assist x 1;Armrests;Increased time required;Verbal cues   Stand to Sit 4  Minimal assistance   Additional items Assist x 1;Armrests;Increased time required;Verbal cues   Functional Mobility   Functional Mobility 4  Minimal assistance   Additional Comments physcial and verbal cues with maneuvering with RW   Additional items Rolling walker   Cognition   Orientation Level Oriented to person;Oriented to place;Disoriented to time;Disoriented to situation   Following Commands Follows one step commands with increased time or repetition   Activity Tolerance   Activity Tolerance Patient tolerated treatment well;Patient limited by fatigue   Assessment   Assessment Pt received sitting upright in side chair; agreeable for skilled OT tx. Pt participated in LBD of socks. Pt required Max A for doff 2* able to bring leg up and reach to doff  sock off heel, but then pt noted with inc fatigue and difficulty bending forward and bringing leg up- unable to complete. Pt required total A donnign sock 2* fatigue and pt reporting unable to do even with encouregement. pt required Min A STS for STS for optimal safety and Min A functional mob with RW 2* physical assistance with maneivering RW and verbal cues for bigger steps. Pt deferred using toilet and sinkside grooming of brushing teeth. Pt wanting to sit back into chair, Min A for sitting for optimal safety. All needs in reach and call bell in side chair. Pt with dec ADLs, would recommend continued STR.   Plan   Treatment Interventions ADL retraining;Functional transfer training;UE strengthening/ROM;Endurance training;Cognitive reorientation;Neuromuscular reeducation;Energy conservation;Activityengagement   Goal Expiration Date 02/22/24   OT Treatment Day 1   OT Frequency 3-5x/wk   Discharge Recommendation   Rehab Resource Intensity Level, OT II (Moderate Resource Intensity)   AM-PAC Daily Activity Inpatient   Lower Body Dressing 2   Bathing 3   Toileting 2   Upper Body Dressing 3   Grooming 3   Eating 3   Daily Activity Raw Score 16   Daily Activity Standardized Score (Calc for Raw Score >=11) 35.96     Zayda Hanley MS, OTR/L CLT

## 2024-02-12 NOTE — ASSESSMENT & PLAN NOTE
Patient with a history of generalized weakness, prior admission discharged to [facility].  Patient arrives from home.  Typically walks with a walker  PT/OT- recommended str   PT/OT reevaluated.  PT recommended level 3, OT recommended level 2.  Wife concerned about patient being discharged home because he is not as his baseline ambulatory wise.  She can't supervise him at home because she is working.  Wife requested patient to be discharged in a rehab to get his strength back and be able to be more independent and then discharged home.    CM placed referrals for rehab

## 2024-02-12 NOTE — CASE MANAGEMENT
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) 2023    Aphasia 2023    Atherosclerosis of native arteries of extremities with intermittent claudication, bilateral legs (HCC) 2023    Benign prostatic hyperplasia with lower urinary tract symptoms 2023    Possible NPH (normal pressure hydrocephalus) ([facility]) 2023    Muscle spasms of both lower extremities 2023    Multiple thyroid nodules 2023    Abnormal laboratory test 05/15/2023    History of tobacco use 2023    Chronic ischemic left MCA stroke 2023    Hypokalemia 2023    Infrarenal abdominal aortic aneurysm (AAA) without rupture ([facility]) 2023    Tachycardia 2023    Prediabetes 2023    SIRS (systemic inflammatory response syndrome) ([facility]) 2023    Chronic anticoagulation 2023    Recurrent strokes ([facility]) 2023    Hyponatremia 2023    Middle cerebral artery stenosis, right 2023    Left posterior MCA stroke - etiology unclear at this time 2023    Chronic low back pain 2023    Hypertensive encephalopathy, transient 2023    Snoring 08/10/2020    Memory deficits 08/10/2020    Status post placement of implantable loop recorder 2019    History of prediabetes 2019    Anxiety and depression 2019    Insomnia 2019    Fall 2019    Hemiplegia of  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 (HCC) 03/28/2019    Urinary retention 03/27/2019    At risk for venous thromboembolism (VTE) 03/26/2019    Hypertriglyceridemia 03/26/2019    Nicotine dependence 03/26/2019    Carotid stenosis, bilateral 03/26/2019    Presbyopia 03/26/2019    History of stroke 03/19/2019    Headache 03/19/2019    Primary hypertension 03/19/2019    GERD (gastroesophageal reflux disease) 03/19/2019      LOS (days): 3  Geometric Mean LOS (GMLOS) (days): 5  Days to GMLOS:2.1     OBJECTIVE:  Risk of Unplanned Readmission Score: 25.23         Current admission status: Inpatient   Preferred Pharmacy:   [facility] - [address] [address]  Phone: [phone] Fax: [phone]    [facility] - [place], [place] - [facility]  [address] [facility] [address]  Phone: [phone] Fax: [phone]    Primary Care Provider: ERIC Calixto    Primary Insurance: MEDICARE  Secondary Insurance: BLUE CROSS    DISCHARGE DETAILS:     Spoke with the pt's wife Faiza to provide SNF referral options-- [facility], [facility] rehab, and [facility] rehab. Pt's wife requested that CM reach back out to [facility] to ask them to review for possible admission. Pt has hx at [facility] and she wants him to go there if they have a bed. Pt's wife stated that [facility] would be her second choice.   Pt's wife stated that the pt has [employer] primary insurance.   CM sent message to [facility] via [facility] and asked them to re-review the pt's information. Waiting on determination on acceptance.

## 2024-02-12 NOTE — CASE MANAGEMENT
Case Management Discharge Planning Note    Patient name Constanza Zhu  Location /-01 MRN [id number]  : 1960 Date 2024       Current Admission Date: 2024  Current Admission Diagnosis:COVID   Patient Active Problem List    Diagnosis Date Noted    Generalized weakness 2024    COVID 2024    AMS (altered mental status) 2024    Internal carotid artery stent present 2024    Vision problem 2024    Dry eyes 2024    Focal epilepsy (HCC) 2023    Claudication of both lower extremities (HCC) 2023    Type 2 diabetes mellitus with other diabetic ophthalmic complication (HCC) 2023    Aphasia 2023    Atherosclerosis of native arteries of extremities with intermittent claudication, bilateral legs (HCC) 2023    Benign prostatic hyperplasia with lower urinary tract symptoms 2023    Possible NPH (normal pressure hydrocephalus) ([facility]) 2023    Muscle spasms of both lower extremities 2023    Multiple thyroid nodules 2023    Abnormal laboratory test 05/15/2023    History of tobacco use 2023    Chronic ischemic left MCA stroke 2023    Hypokalemia 2023    Infrarenal abdominal aortic aneurysm (AAA) without rupture ([facility]) 2023    Tachycardia 2023    Prediabetes 2023    SIRS (systemic inflammatory response syndrome) ([facility]) 2023    Chronic anticoagulation 2023    Recurrent strokes ([facility]) 2023    Hyponatremia 2023    Middle cerebral artery stenosis, right 2023    Left posterior MCA stroke - etiology unclear at this time 2023    Chronic low back pain 2023    Hypertensive encephalopathy, transient 2023    Snoring 08/10/2020    Memory deficits 08/10/2020    Status post placement of implantable loop recorder 2019    History of prediabetes 2019    Anxiety and depression 2019    Insomnia 2019    Fall 2019    Hemiplegia of  "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 (HCC) 03/28/2019    Urinary retention 03/27/2019    At risk for venous thromboembolism (VTE) 03/26/2019    Hypertriglyceridemia 03/26/2019    Nicotine dependence 03/26/2019    Carotid stenosis, bilateral 03/26/2019    Presbyopia 03/26/2019    History of stroke 03/19/2019    Headache 03/19/2019    Primary hypertension 03/19/2019    GERD (gastroesophageal reflux disease) 03/19/2019      LOS (days): 3  Geometric Mean LOS (GMLOS) (days): 5  Days to GMLOS:1.9     OBJECTIVE:  Risk of Unplanned Readmission Score: 25.76         Current admission status: Inpatient   Preferred Pharmacy:   [facility] [address] [facility]  [address] [facility] [address]  Phone: [phone] Fax: [phone]    [facility] - [place], [place] - [facility]  [address] [facility] [address]  Phone: [phone] Fax: [phone]    Primary Care Provider: ERIC Calixto    Primary Insurance: BLUE CROSS  Secondary Insurance: MEDICARE    DISCHARGE DETAILS:           Spoke with the pt's wife to review PT/OT recommendations.  Pt's voiced frustration at the PT recommendation for HHC and OT\"s recommendation for SNF.   Pt's wife wants to speak with Hyacinth from PT and provided PT with the pt's wife's phone number.   Spoke with Hyacinth from PT, she explained her recommendation to the pt's wife and answered questions .  Called back to the pt's wife, she voiced more frustration at the recommendations and stated that the pt will not be safe to return home because she will not be able to care for him. The pt's wife now has COVID too.   Pt's wife agreeable to sending auth request to [facility] and see if they approve.   Pt's wife agreeable to [facility] SNF.   Sppoke with [facility] liaison Gerri, she will submit for [facility] auth today and will update CM with determination.    Waiting for insurance determination of STR placement.          "

## 2024-02-13 LAB
ANION GAP SERPL CALCULATED.3IONS-SCNC: 11 MMOL/L
BUN SERPL-MCNC: 26 MG/DL (ref 5–25)
CALCIUM SERPL-MCNC: 9.3 MG/DL (ref 8.4–10.2)
CHLORIDE SERPL-SCNC: 107 MMOL/L (ref 96–108)
CO2 SERPL-SCNC: 27 MMOL/L (ref 21–32)
CREAT SERPL-MCNC: 0.88 MG/DL (ref 0.6–1.3)
ERYTHROCYTE [DISTWIDTH] IN BLOOD BY AUTOMATED COUNT: 13.3 % (ref 11.6–15.1)
GFR SERPL CREATININE-BSD FRML MDRD: 90 ML/MIN/1.73SQ M
GLUCOSE SERPL-MCNC: 97 MG/DL (ref 65–140)
HCT VFR BLD AUTO: 39.2 % (ref 36.5–49.3)
HGB BLD-MCNC: 13.1 G/DL (ref 12–17)
MCH RBC QN AUTO: 31.3 PG (ref 26.8–34.3)
MCHC RBC AUTO-ENTMCNC: 33.4 G/DL (ref 31.4–37.4)
MCV RBC AUTO: 94 FL (ref 82–98)
PLATELET # BLD AUTO: 254 THOUSANDS/UL (ref 149–390)
PMV BLD AUTO: 9.4 FL (ref 8.9–12.7)
POTASSIUM SERPL-SCNC: 3.5 MMOL/L (ref 3.5–5.3)
RBC # BLD AUTO: 4.19 MILLION/UL (ref 3.88–5.62)
SODIUM SERPL-SCNC: 145 MMOL/L (ref 135–147)
WBC # BLD AUTO: 10.9 THOUSAND/UL (ref 4.31–10.16)

## 2024-02-13 PROCEDURE — 80048 BASIC METABOLIC PNL TOTAL CA: CPT | Performed by: INTERNAL MEDICINE

## 2024-02-13 PROCEDURE — 85027 COMPLETE CBC AUTOMATED: CPT | Performed by: INTERNAL MEDICINE

## 2024-02-13 PROCEDURE — 99232 SBSQ HOSP IP/OBS MODERATE 35: CPT | Performed by: INTERNAL MEDICINE

## 2024-02-13 RX ADMIN — DONEPEZIL HYDROCHLORIDE 10 MG: 5 TABLET ORAL at 10:08

## 2024-02-13 RX ADMIN — LOSARTAN POTASSIUM 50 MG: 50 TABLET, FILM COATED ORAL at 10:09

## 2024-02-13 RX ADMIN — ENOXAPARIN SODIUM 40 MG: 100 INJECTION SUBCUTANEOUS at 10:08

## 2024-02-13 RX ADMIN — LEVETIRACETAM 500 MG: 500 TABLET, FILM COATED ORAL at 10:09

## 2024-02-13 RX ADMIN — METOPROLOL SUCCINATE 100 MG: 50 TABLET, EXTENDED RELEASE ORAL at 10:08

## 2024-02-13 RX ADMIN — TICAGRELOR 90 MG: 90 TABLET ORAL at 10:09

## 2024-02-13 RX ADMIN — PANTOPRAZOLE SODIUM 20 MG: 20 TABLET, DELAYED RELEASE ORAL at 05:14

## 2024-02-13 RX ADMIN — TICAGRELOR 90 MG: 90 TABLET ORAL at 20:48

## 2024-02-13 RX ADMIN — TRAZODONE HYDROCHLORIDE 100 MG: 50 TABLET ORAL at 20:48

## 2024-02-13 RX ADMIN — ATORVASTATIN CALCIUM 40 MG: 40 TABLET, FILM COATED ORAL at 10:09

## 2024-02-13 RX ADMIN — LEVETIRACETAM 500 MG: 500 TABLET, FILM COATED ORAL at 20:48

## 2024-02-13 RX ADMIN — TAMSULOSIN HYDROCHLORIDE 0.4 MG: 0.4 CAPSULE ORAL at 18:28

## 2024-02-13 RX ADMIN — Medication 5 MG: at 20:47

## 2024-02-13 RX ADMIN — ASPIRIN 81 MG: 81 TABLET, COATED ORAL at 10:09

## 2024-02-13 NOTE — PLAN OF CARE
Problem: PAIN - ADULT  Goal: Verbalizes/displays adequate comfort level or baseline comfort level  Description: Interventions:  - Encourage patient to monitor pain and request assistance  - Assess pain using appropriate pain scale  - Administer analgesics based on type and severity of pain and evaluate response  - Implement non-pharmacological measures as appropriate and evaluate response  - Consider cultural and social influences on pain and pain management  - Notify physician/advanced practitioner if interventions unsuccessful or patient reports new pain  Outcome: Progressing     Problem: INFECTION - ADULT  Goal: Absence or prevention of progression during hospitalization  Description: INTERVENTIONS:  - Assess and monitor for signs and symptoms of infection  - Monitor lab/diagnostic results  - Monitor all insertion sites, i.e. indwelling lines, tubes, and drains  - Monitor endotracheal if appropriate and nasal secretions for changes in amount and color  - [place] appropriate cooling/warming therapies per order  - Administer medications as ordered  - Instruct and encourage patient and family to use good hand hygiene technique  - Identify and instruct in appropriate isolation precautions for identified infection/condition  Outcome: Progressing

## 2024-02-13 NOTE — ASSESSMENT & PLAN NOTE
Patient with a history of generalized weakness, [facility]  Patient arrives from home.  Typically walks with a walker  PT/OT- recommended str   PT/OT reevaluated.  PT recommended level 3, OT recommended level 2.  Wife concerned about patient being discharged home because he is not as his baseline ambulatory wise.  She can't supervise him at home because she is working.  Wife requested patient to be discharged in a rehab to get his strength back and be able to be more independent and then discharged home.    CM placed referrals for rehab

## 2024-02-13 NOTE — CASE MANAGEMENT
Case Management Progress Note    Patient name Constanza Zhu  Location /-01 MRN [id number]  : 1960 Date 2024       LOS (days): 4  Geometric Mean LOS (GMLOS) (days): 5  Days to GMLOS:0.9        OBJECTIVE:        Current admission status: Inpatient  Preferred Pharmacy:   [facility] - [address]  [address] [facility] [address]  Phone: [phone] Fax: [phone]    [facility] - [place], [place] - [facility]  [address] [facility] [address]  Phone: [phone] Fax: [phone]    Primary Care Provider: ERIC Calixto    Primary Insurance: BLUE CROSS  Secondary Insurance: MEDICARE    PROGRESS NOTE:    CM spoke to Gerri at [facility] who states pt is accepted and a bed is available. Gerri states she was trying to obtain authorization but was having trouble reaching the insurance.  CM called and spoke to pt's wife to inform of same. Pt's wife Faiza is requesting a referral to [facility]. AIDIN referral sent. Awaiting determination from [facility].

## 2024-02-13 NOTE — UTILIZATION REVIEW
Continued Stay Review    Date: 2/13/24                           Current Patient Class: inpatient   Current Level of Care: med surg    HPI:64 y.o. male initially admitted on 2/8/24  due to altered mental status/weakness   Patient with history of NPH, hypertension induced encephalopathy, recent diagnosis of COVID, CVA.  For COVID treated with Remdesivir. Baseline GCS is typically greater than 12 given prior history of multiple strokes.       Assessment/Plan: =  2/13/24:   baseline mental status. Forgetful.  Fatigued.     Care management has sent referrals for rehab and awaiting determination.         On exam:  alert and awake.   Oriented to person and place.  Disoriented to time.  Poor judgement.    On neuro checks every 4 hours.    Calorie count .  PT/OT    Vital Signs:   02/13/24 06:52:26 98.2 °F (36.8 °C) -- 16 168/91 117 -- -- --   02/12/24 16:17:48 98.1 °F (36.7 °C) 79 -- 135/71 92 96 % -- --   02/12/24 09:17:22 98.4 °F (36.9 °C) -- -- 140/74 96 -- -- --   02/11/24 2007 -- -- -- -- -- 98 % None (Room air) --   02/11/24 19:19:39 98.9 °F (37.2 °C) -- 16 133/65 88 -- -- Lying   02/11/24 1700 100.4 °F (38 °C) 76 21 131/66 -- 96 % None (Room air)      Pertinent Labs/Diagnostic Results:   Results from last 7 days   Lab Units 02/07/24  1824   SARS-COV-2  Positive*     Results from last 7 days   Lab Units 02/13/24  0456 02/10/24  0358 02/09/24  0221 02/08/24  0615 02/07/24  1824   WBC Thousand/uL 10.90* 10.76* 9.71 10.36* 10.85*   HEMOGLOBIN g/dL 13.1 13.7 13.3 13.4 13.4   HEMATOCRIT % 39.2 41.3 40.4 39.7 40.5   PLATELETS Thousands/uL 254 185 189 200 219   NEUTROS ABS Thousands/µL  --  7.29 6.54  --  8.80*     Results from last 7 days   Lab Units 02/13/24  0456 02/11/24  0426 02/10/24  0358 02/09/24  0221 02/08/24  0708   SODIUM mmol/L 145 139 134* 135 136   POTASSIUM mmol/L 3.5 3.5 3.7 3.4* 3.5   CHLORIDE mmol/L 107 107 103 101 101   CO2 mmol/L 27 24 21 23 28   ANION GAP mmol/L 11 8 10 11 7   BUN mg/dL 26* 29* 26* 25  16   CREATININE mg/dL 0.88 0.90 0.93 0.87 0.91   EGFR ml/min/1.73sq m 90 89 85 90 88   CALCIUM mg/dL 9.3 9.2 8.9 9.4 9.3     Results from last 7 days   Lab Units 02/10/24  0358 02/09/24  0221 02/07/24  1824   AST U/L 21 19 15   ALT U/L 15 16 17   ALK PHOS U/L 66 66 75   TOTAL PROTEIN g/dL 6.6 6.5 7.2   ALBUMIN g/dL 3.7 3.8 4.3   TOTAL BILIRUBIN mg/dL 0.63 0.71 0.54         Results from last 7 days   Lab Units 02/13/24  0456 02/11/24  0426 02/10/24  0358 02/09/24 0221 02/08/24  0708 02/07/24  1824   GLUCOSE RANDOM mg/dL 97 104 96 83 100 128         Results from last 7 days   Lab Units 02/07/24  1854   TSH 3RD GENERATON uIU/mL 0.890     Results from last 7 days   Lab Units 02/08/24  0708   PROCALCITONIN ng/ml 0.06     Results from last 7 days   Lab Units 02/07/24 2012   CLARITY UA  Clear   COLOR UA  Yellow   SPEC GRAV UA  1.020   PH UA  6.0   GLUCOSE UA mg/dl 70 (7/100%)*   KETONES UA mg/dl Negative   BLOOD UA  Negative   PROTEIN UA mg/dl 30 (1+)*   NITRITE UA  Negative   BILIRUBIN UA  Negative   UROBILINOGEN UA (BE) mg/dl <2.0   LEUKOCYTES UA  Negative   WBC UA /hpf 0-1   RBC UA /hpf 0-1   BACTERIA UA /hpf None Seen   EPITHELIAL CELLS WET PREP /hpf Occasional     Results from last 7 days   Lab Units 02/07/24  1824   INFLUENZA A PCR  Negative   INFLUENZA B PCR  Negative   RSV PCR  Negative         Results from last 7 days   Lab Units 02/07/24 2012   AMPH/METH  Negative   BARBITURATE UR  Negative   BENZODIAZEPINE UR  Negative   COCAINE UR  Negative   METHADONE URINE  Negative   OPIATE UR  Negative   PCP UR  Negative   THC UR  Negative     Results from last 7 days   Lab Units 02/08/24  0708 02/07/24  1854   ETHANOL LVL mg/dL  --  <10   ACETAMINOPHEN LVL ug/mL <2* 11   SALICYLATE LVL mg/dL  --  <5       Medications:   Scheduled Medications:  aspirin, 81 mg, Oral, Daily  atorvastatin, 40 mg, Oral, Daily With Breakfast  donepezil, 10 mg, Oral, Daily  enoxaparin, 40 mg, Subcutaneous, Daily  levETIRAcetam, 500 mg, Oral,  Q12H JOSE  losartan, 50 mg, Oral, Daily  melatonin, 5 mg, Oral, HS  metoprolol succinate, 100 mg, Oral, Daily  pantoprazole, 20 mg, Oral, Early Morning  polyethylene glycol, 17 g, Oral, Daily  senna-docusate sodium, 1 tablet, Oral, BID  tamsulosin, 0.4 mg, Oral, Daily With Dinner  ticagrelor, 90 mg, Oral, Q12H JOSE  traZODone, 100 mg, Oral, HS    Continuous IV Infusions:     PRN Meds: not used.   Artificial Tears, 1 drop, Both Eyes, Q4H PRN  baclofen, 5 mg, Oral, BID PRN        Discharge Plan: to be determined.     Network Utilization Review Department  ATTENTION: Please call with any questions or concerns to [phone] and carefully listen to the prompts so that you are directed to the right person. All voicemails are confidential.   For Discharge needs, contact Care Management DC Support Team at [phone] opt. [phone]  Send all requests for admission clinical reviews, approved or denied determinations and any other requests to dedicated fax number below belonging to the [facility] where the patient is receiving treatment. List of dedicated fax numbers for the Facilities:  FACILITY NAME UR FAX NUMBER   ADMISSION DENIALS (Administrative/Medical Necessity) [phone]   DISCHARGE SUPPORT TEAM (NETWORK) [phone]   PARENT CHILD HEALTH (Maternity/NICU/Pediatrics) [phone]   [facility] [phone]   [facility] [phone]   [facility] [phone]   [facility] [phone]   [facility] [phone]   [facility] [phone]   [facility] [phone]   [facility] [phone]   [facility] [phone]   [facility] [phone]   [facility] [phone]   [facility] [phone]

## 2024-02-13 NOTE — PROGRESS NOTES
[facility]  Progress Note  Name: Constanza Zhu I  MRN: [id number]  Unit/Bed#: -01 I Date of Admission: 2/7/2024   Date of Service: 2/13/2024 I Hospital Day: 4    Assessment/Plan   AMS (altered mental status)  Assessment & Plan  Patient with history of NPH, hypertension induced encephalopathy, recent diagnosis of COVID was found to have increased lethargy and confusion around 3 PM this afternoon when his wife came to check on him.  Baseline GCS is typically greater than 12 given prior history of multiple strokes.  GCS at time of admission 13, patient is only oriented to self  CT head and neck:  No changes from prior imaging studies, no intracranial hemorrhage or mass, stable old infarcts  Altered mental status likely in the setting of COVID infection, mental status returned to baseline  Continue to monitor neuro status  Currently at baseline     Generalized weakness  Assessment & Plan  Patient with a history of generalized weakness, prior admission discharged to [facility].  Patient arrives from home.  Typically walks with a walker  PT/OT- recommended str   PT/OT reevaluated.  PT recommended level 3, OT recommended level 2.  Wife concerned about patient being discharged home because he is not as his baseline ambulatory wise.  She can't supervise him at home because she is working.  Wife requested patient to be discharged in a rehab to get his strength back and be able to be more independent and then discharged home.    CM placed referrals for rehab    Focal epilepsy (HCC)  Assessment & Plan  Patient with history of staring episodes, felt to be absence seizure's  Follows with neurology as outpatient  No acute events  continue Keppra 500 Mg    History of prediabetes  Assessment & Plan  Continue carb controlled diet and monitor sugars with BMP  Sugars remained stable    GERD (gastroesophageal reflux disease)  Assessment & Plan  Continue Protonix 20 mg    Primary  hypertension  Assessment & Plan  Continue losartan and metoprolol  Patient hypertensive on admission, as needed labetalol IV for SBP greater than 180  Blood pressure control remains stable    History of stroke  Assessment & Plan  Patient with history of bilateral MCA strokes, with right stenosis and left stenosis with left ICA stent placement in May 2023  Baseline GCS 12, per EMS  GCS at time of admission 13, eye-opening to sound, confused, obeying commands  Follows with neurology outpatient  Per last neurology note BP goal of <130/80  Continue aspirin, Plavix, statin    * COVID  Assessment & Plan  Patient admitted in October 2023 for sepsis due to COVID, given IV remdesivir  Patient noted to have a fever of 101 at home today and was given 2 Tylenol,  Recurrent fever last evening of 100.6  Patient remains on room air  Completed remdesivir    Hypokalemia-resolved as of 2/12/2024  Assessment & Plan  Patient with potassium of 3.2 on admission  Received 40 mill equivalents potassium in the ED  Hold home HTZ 12.5mg  Hypokalemia resolved               VTE Pharmacologic Prophylaxis: VTE Score: 8 High Risk (Score >/= 5) - Pharmacological DVT Prophylaxis Ordered: enoxaparin (Lovenox). Sequential Compression Devices Ordered.    Mobility:   Basic Mobility Inpatient Raw Score: 20  JH-HLM Goal: 6: Walk 10 steps or more  JH-HLM Achieved: 6: Walk 10 steps or more  HLM Goal NOT achieved. Continue with multidisciplinary rounding and encourage appropriate mobility to improve upon HLM goals.    Patient Centered Rounds: I performed bedside rounds with nursing staff today.   Discussions with Specialists or Other Care Team Provider:  cm    Education and Discussions with Family / Patient: Updated  (wife) via phone.    Total Time Spent on Date of Encounter in care of patient: 32 mins. This time was spent on one or more of the following: performing physical exam; counseling and coordination of care; obtaining or reviewing  history; documenting in the medical record; reviewing/ordering tests, medications or procedures; communicating with other healthcare professionals and discussing with patient's family/caregivers.    Current Length of Stay: 4 day(s)  Current Patient Status: Inpatient   Certification Statement: The patient will continue to require additional inpatient hospital stay due to waiting placement   Discharge Plan: Anticipate discharge tomorrow to rehab facility.    Code Status: Level 1 - Full Code    Subjective:     Said he has poor appetite in the hospital and he does not like the food.  Has no complaints    Objective:     Vitals:   Temp (24hrs), Av.2 °F (36.8 °C), Min:98.2 °F (36.8 °C), Max:98.2 °F (36.8 °C)    Temp:  [98.2 °F (36.8 °C)] 98.2 °F (36.8 °C)  Resp:  [16] 16  BP: (168)/(91) 168/91  Body mass index is 27.55 kg/m².     Input and Output Summary (last 24 hours):     Intake/Output Summary (Last 24 hours) at 2024 1727  Last data filed at 2024 0457  Gross per 24 hour   Intake 480 ml   Output 300 ml   Net 180 ml       Physical Exam:   Physical Exam  Vitals and nursing note reviewed.   Constitutional:       General: He is not in acute distress.     Appearance: He is not diaphoretic.   HENT:      Head: Normocephalic.   Eyes:      General: No scleral icterus.        Right eye: No discharge.         Left eye: No discharge.   Cardiovascular:      Rate and Rhythm: Normal rate and regular rhythm.   Pulmonary:      Effort: Pulmonary effort is normal. No respiratory distress.      Breath sounds: Normal breath sounds. No wheezing, rhonchi or rales.   Abdominal:      General: There is no distension.      Palpations: Abdomen is soft.      Tenderness: There is no abdominal tenderness. There is no guarding or rebound.   Musculoskeletal:      Cervical back: Normal range of motion.      Right lower leg: No edema.      Left lower leg: No edema.   Skin:     General: Skin is warm.   Neurological:      Mental Status: He is  alert.      Comments: Oriented to self and place   Psychiatric:         Mood and Affect: Mood normal.         Behavior: Behavior normal.          Additional Data:     Labs:  Results from last 7 days   Lab Units 02/13/24  0456 02/10/24  0358   WBC Thousand/uL 10.90* 10.76*   HEMOGLOBIN g/dL 13.1 13.7   HEMATOCRIT % 39.2 41.3   PLATELETS Thousands/uL 254 185   NEUTROS PCT %  --  67   LYMPHS PCT %  --  16   MONOS PCT %  --  13*   EOS PCT %  --  2     Results from last 7 days   Lab Units 02/13/24  0456 02/11/24  0426 02/10/24  0358   SODIUM mmol/L 145   < > 134*   POTASSIUM mmol/L 3.5   < > 3.7   CHLORIDE mmol/L 107   < > 103   CO2 mmol/L 27   < > 21   BUN mg/dL 26*   < > 26*   CREATININE mg/dL 0.88   < > 0.93   ANION GAP mmol/L 11   < > 10   CALCIUM mg/dL 9.3   < > 8.9   ALBUMIN g/dL  --   --  3.7   TOTAL BILIRUBIN mg/dL  --   --  0.63   ALK PHOS U/L  --   --  66   ALT U/L  --   --  15   AST U/L  --   --  21   GLUCOSE RANDOM mg/dL 97   < > 96    < > = values in this interval not displayed.                 Results from last 7 days   Lab Units 02/08/24  0708   PROCALCITONIN ng/ml 0.06       Lines/Drains:  Invasive Devices       Peripheral Intravenous Line  Duration             Peripheral IV 02/13/24 Distal;Right;Upper;Ventral (anterior) Arm <1 day              Drain  Duration             External Urinary Catheter Small 4 days                          Imaging: No pertinent imaging reviewed.    Recent Cultures (last 7 days):         Last 24 Hours Medication List:   Current Facility-Administered Medications   Medication Dose Route Frequency Provider Last Rate    Artificial Tears  1 drop Both Eyes Q4H PRN Spenser Lopez MD      aspirin  81 mg Oral Daily Margarita Torres PA-C      atorvastatin  40 mg Oral Daily With Breakfast Margarita Torres PA-C      baclofen  5 mg Oral BID PRN Margarita Torres PA-C      donepezil  10 mg Oral Daily Margarita Torres PA-C      enoxaparin  40 mg Subcutaneous Daily Margarita Torres  TIM      levETIRAcetam  500 mg Oral Q12H [facility] Margarita Torres PA-C      losartan  50 mg Oral Daily Margarita Torres PA-C      melatonin  5 mg Oral HS Margarita Torres PA-C      metoprolol succinate  100 mg Oral Daily Margarita Torres PA-C      pantoprazole  20 mg Oral Early Morning Margarita Torres PA-C      polyethylene glycol  17 g Oral Daily Pilar Glaser MD      senna-docusate sodium  1 tablet Oral BID Pilar Glaser MD      tamsulosin  0.4 mg Oral Daily With Dinner Margarita Torres PA-C      ticagrelor  90 mg Oral Q12H [facility] Margarita Torres PA-C      traZODone  100 mg Oral HS Margarita Torres PA-C          Today, Patient Was Seen By: Pilar Glaser MD    **Please Note: This note may have been constructed using a voice recognition system.**

## 2024-02-13 NOTE — UTILIZATION REVIEW
Initial Clinical Review    Admission: Date/Time/Statement: 2/8/24 0004 observation AND CHANGED 2/9/24 1247 DUE TO ONGOING WEAKNESS, DECREASED APPETITE WITH + COVID ON IV REMDESIVIR,  MONITOR NEURO STATUS, PT/OT WITH NEED FOR SHORT TERM REHAB.  BMP AND REPLETION OF ELECTROLYTES.   Admission Orders (From admission, onward)       Ordered        02/09/24 1247  Inpatient Admission  Once                          Orders Placed This Encounter   Procedures    Inpatient Admission     Standing Status:   Standing     Number of Occurrences:   1     Order Specific Question:   Level of Care     Answer:   Med Surg [16]     Order Specific Question:   Estimated length of stay     Answer:   More than 2 Midnights     Order Specific Question:   Certification     Answer:   I certify that inpatient services are medically necessary for this patient for a duration of greater than two midnights. See H&P and MD Progress Notes for additional information about the patient's course of treatment.     ED Arrival Information       Expected   -    Arrival   2/7/2024 18:16    Acuity   Emergent              Means of arrival   Ambulance    Escorted by   [facility]    Service   Hospitalist    Admission type   Emergency              Arrival complaint   Altered mental status             Chief Complaint   Patient presents with    Altered Mental Status     Per EMS, more altered than baseline. Typically GCS 14. Flu like symptoms started this afternoon        Initial Presentation: 64 y.o. male from home to ED 2/7/24 and observation order placed 2/8/24 CONVERTED ON 2/9 TO INPATIENT  due to acute encephalopathy/COVID/Elevated tylenol level/hypertension/weakness.  Presented due to altered mental status starting afternoon of arrival wit increased lethargy and confusion.   In the morning, cough then in the afternoon increased weakness and unable to walk.   Did take tylenol today for flu like symptoms.   On exam confused.   Hypertensive.  Wbc 10.85>10.36.     SARS-CoV-2 positive.  K 3.2.   cta head and neck no acute findings, old infarcts.  Acetaminophen 11.  In the ED given KCL.   Started on IVF.  Plan:  monitor neuro status.   PT/OT. Start IV Remdesivir.  Monitor oxygen sats.    Replete electrolytes..   check BMP in am.   Continue home medications.  as needed labetalol IV for SBP greater than 180     2/9/24 CHANGED TO INPATIENT:  febrile.   Today oriented to person and place, not time.  Some stuttering, baseline.  Appetite decreased.  On exam: awake and alert, oriented x 2.  K 3.4   monitor neuro exam.   Continue remdesivir.  Monitor oxygen sats.    Short term rehab is recommended.  PT/OT    2/10/24:   wife does not feel patient is strong enough to care for self at this time, as she works during the day.    On exam weakness.    Plan:  PT/OT.  Probable Short term rehab.   Continued remdesivir.  Monitor oxygen sats.   Dc IVF.  BMP in am.       ED Triage Vitals   Temperature Pulse Respirations Blood Pressure SpO2   02/07/24 1821 02/07/24 1821 02/07/24 1821 02/07/24 1821 02/07/24 1821   99.7 °F (37.6 °C) 88 20 (!) 148/110 96 %      Temp Source Heart Rate Source Patient Position - Orthostatic VS BP Location FiO2 (%)   02/07/24 1821 02/07/24 1900 02/07/24 1821 02/07/24 1821 --   Oral Monitor Sitting Left arm       Pain Score       02/07/24 1821       No Pain          Wt Readings from Last 1 Encounters:   02/11/24 87.1 kg (192 lb)     Additional Vital Signs:   02/10/24 07:31:35 98.4 °F (36.9 °C) 89 -- 118/65 83 96 % -- --   02/09/24 20:37:15 98.4 °F (36.9 °C) -- 14 117/67 84 -- -- --   02/09/24 15:09:16 98.5 °F (36.9 °C) -- 12 120/65 83 -- -- --   02/09/24 0941 98.6 °F (37 °C) -- -- -- --        02/09/24 01:51:39 99.5 °F (37.5 °C) 83 -- -- -- 95 % -- --   02/08/24 22:07:31 99.7 °F (37.6 °C) 93 19 135/76 96 96 % None (Room air) --   02/08/24 20:25:07 100.6 °F (38.1 °C) Abnormal  91 -- 123/68 86 94 % -- --   02/08/24 20:24:33 100.6 °F (38.1 °C) Abnormal  -- -- 123/68 86 --  -- --   02/08/24 17:02:33 -- 96 -- 137/89 105 97 % -- --   02/08/24 1300 -- -- -- -- -- -- None (Room air) --   02/08/24 1135 99.5 °F (37.5 °C) -- -- -- -- -- -- --   02/08/24 1000 -- 85 20 152/70 100 94 % None (Room air) Lying   02/08/24 0900 -- 90 29 Abnormal  159/73 107 95 % -- --   02/08/24 0800 -- 93 22 156/67 97 95 % None (Room air) Lying   02/08/24 0700 -- 97 23 Abnormal  179/79 Abnormal  114 94 % -- --   02/08/24 0600 -- 96 20 166/78 112 95 % None (Room air) --   02/08/24 0500 -- 98 21 174/79 Abnormal  113 95 % None (Room air) Sitting   02/08/24 0449 99.8 °F (37.7 °C) -- -- -- -- -- -- --   02/08/24 0400 -- 105 20 181/78 Abnormal  112 96 % None (Room air) --   02/08/24 0300 -- 106 Abnormal  20 187/85 Abnormal  -- 95 % None (Room air) Sitting   02/08/24 0200 -- 104 20 176/79 Abnormal  113 94 % -- --   02/08/24 0130 -- 108 Abnormal  18 160/72 103 95 % None (Room air) --   02/08/24 0100 -- 109 Abnormal  20 191/85 Abnormal  122 95 % -- --   02/08/24 0000 -- 107 Abnormal  18 167/77 111 96 % -- --   02/07/24 2300 -- 107 Abnormal  20 169/75 108 96 % -- --   02/07/24 2200 -- 113 Abnormal  18 147/79 108 98 % -- --   02/07/24 2100 -- 99 20 159/104 Abnormal  124 96 % -- --   02/07/24 2000 -- 95 18 143/90 109 97 % None (Room air)      Pertinent Labs/Diagnostic Test Results:   CTA head and neck with and without contrast   Final Result by Denver Chinchilla MD (02/07 8979)      1.  No acute intracranial hemorrhage. Stable senescent changes and sequelae of multifocal old infarcts in the brain parenchyma detailed above. Paranasal sinus disease.   2.  Mild calcific atherosclerosis of the aortic arch region.  Moderate (50%) luminal narrowing of the proximal right subclavian artery due to partially calcified mixed plaque.   3.  Right vertebral artery dominance.  Moderate (50 to 70%) luminal stenosis involving the distal left vertebral artery V4 segment due to calcific atherosclerosis. Findings appear similar compared to the prior  exam.   4.  Normal course and caliber of the right proximal to mid common carotid artery. Moderate (50%) luminal narrowing of the distal right common carotid artery and proximal right internal carotid artery due to irregular mixed plaque. Remainder of the right    cervical internal carotid artery appear patent with normal course and caliber.   5.  The proximal to mid left common carotid artery is widely patent with normal course and caliber. Metallic stent in the distal left common carotid artery and proximal left internal carotid artery is again seen with moderate to severe circumferential    narrowing of the proximal internal carotid artery, similar compared to the prior study. This is likely related to intimal hyperplasia. Remainder of the left cervical internal carotid artery appear grossly patent with normal course and caliber. No    occlusion or dissection.   6.  Short segment of complete occlusion involving the distal right M1 segment is again seen, similar compared to the prior study with reconstitution of more distal branches via collateral vessels. Stable appearance of slightly diminished caliber of the    distal right MCA branch vessels although they remain patent. Left middle cerebral artery and proximal branches appear grossly patent with normal course and caliber.   7.  No enhancing brain mass/fluid collection or evidence of vascular malformation. No enhancing soft tissue neck mass/fluid collection or cervical lymphadenopathy.   8.  Stable nonspecific subcentimeter hypodense thyroid nodules requiring no imaging follow-up per current guidelines.      Workstation performed: [id number]         XR chest 1 view portable   ED Interpretation by Myriam Leger MD (02/07 1934)   No acute pulmonary pathology      Final Result by Jarrell Leon MD (02/08 0837)      No acute cardiopulmonary disease.            Workstation performed: [id number]             2/7/24 ecg Sinus rhythm with Blocked Premature  atrial complexes  Nonspecific T wave abnormality  Abnormal ECG  When compared with ECG of 07-FEB-2024 18:31, (unconfirmed)  Premature atrial complexes are now Present  Sinus rhythm is no longer with 2nd degree A-V block (Mobitz II)  QT has shortened    Results from last 7 days   Lab Units 02/07/24  1824   SARS-COV-2  Positive*     Results from last 7 days   Lab Units 02/13/24  0456 02/10/24  0358 02/09/24  0221 02/08/24  0615 02/07/24  1824   WBC Thousand/uL 10.90* 10.76* 9.71 10.36* 10.85*   HEMOGLOBIN g/dL 13.1 13.7 13.3 13.4 13.4   HEMATOCRIT % 39.2 41.3 40.4 39.7 40.5   PLATELETS Thousands/uL 254 185 189 200 219   NEUTROS ABS Thousands/µL  --  7.29 6.54  --  8.80*     Results from last 7 days   Lab Units 02/13/24  0456 02/11/24  0426 02/10/24  0358 02/09/24  0221 02/08/24  0708   SODIUM mmol/L 145 139 134* 135 136   POTASSIUM mmol/L 3.5 3.5 3.7 3.4* 3.5   CHLORIDE mmol/L 107 107 103 101 101   CO2 mmol/L 27 24 21 23 28   ANION GAP mmol/L 11 8 10 11 7   BUN mg/dL 26* 29* 26* 25 16   CREATININE mg/dL 0.88 0.90 0.93 0.87 0.91   EGFR ml/min/1.73sq m 90 89 85 90 88   CALCIUM mg/dL 9.3 9.2 8.9 9.4 9.3     Results from last 7 days   Lab Units 02/10/24  0358 02/09/24  0221 02/07/24  1824   AST U/L 21 19 15   ALT U/L 15 16 17   ALK PHOS U/L 66 66 75   TOTAL PROTEIN g/dL 6.6 6.5 7.2   ALBUMIN g/dL 3.7 3.8 4.3   TOTAL BILIRUBIN mg/dL 0.63 0.71 0.54     Results from last 7 days   Lab Units 02/13/24  0456 02/11/24  0426 02/10/24  0358 02/09/24  0221 02/08/24  0708 02/07/24  1824   GLUCOSE RANDOM mg/dL 97 104 96 83 100 128     Results from last 7 days   Lab Units 02/07/24  1854   TSH 3RD GENERATON uIU/mL 0.890     Results from last 7 days   Lab Units 02/08/24  0708   PROCALCITONIN ng/ml 0.06     Results from last 7 days   Lab Units 02/07/24 2012   CLARITY UA  Clear   COLOR UA  Yellow   SPEC GRAV UA  1.020   PH UA  6.0   GLUCOSE UA mg/dl 70 (7/100%)*   KETONES UA mg/dl Negative   BLOOD UA  Negative   PROTEIN UA mg/dl 30 (1+)*    NITRITE UA  Negative   BILIRUBIN UA  Negative   UROBILINOGEN UA (BE) mg/dl <2.0   LEUKOCYTES UA  Negative   WBC UA /hpf 0-1   RBC UA /hpf 0-1   BACTERIA UA /hpf None Seen   EPITHELIAL CELLS WET PREP /hpf Occasional     Results from last 7 days   Lab Units 02/07/24  1824   INFLUENZA A PCR  Negative   INFLUENZA B PCR  Negative   RSV PCR  Negative     Results from last 7 days   Lab Units 02/07/24 2012   AMPH/METH  Negative   BARBITURATE UR  Negative   BENZODIAZEPINE UR  Negative   COCAINE UR  Negative   METHADONE URINE  Negative   OPIATE UR  Negative   PCP UR  Negative   THC UR  Negative     Results from last 7 days   Lab Units 02/08/24  0708 02/07/24  1854   ETHANOL LVL mg/dL  --  <10   ACETAMINOPHEN LVL ug/mL <2* 11   SALICYLATE LVL mg/dL  --  <5         ED Treatment:   Medication Administration from 02/07/2024 1816 to 02/08/2024 1147         Date/Time Order Dose Route Action Comments     02/07/2024 2008 EST potassium chloride (Klor-Con M20) CR tablet 40 mEq 40 mEq Oral Given --     02/08/2024 0817 EST aspirin (ECOTRIN LOW STRENGTH) EC tablet 81 mg 81 mg Oral Given --     02/08/2024 0705 EST atorvastatin (LIPITOR) tablet 40 mg 40 mg Oral Given --     02/08/2024 0817 EST donepezil (ARICEPT) tablet 10 mg 10 mg Oral Given --     02/08/2024 0817 EST levETIRAcetam (KEPPRA) tablet 500 mg 500 mg Oral Given --     02/08/2024 0817 EST losartan (COZAAR) tablet 50 mg 50 mg Oral Given --     02/08/2024 0817 EST metoprolol succinate (TOPROL-XL) 24 hr tablet 100 mg 100 mg Oral Given --     02/08/2024 0614 EST pantoprazole (PROTONIX) EC tablet 20 mg 20 mg Oral Given --     02/08/2024 0818 EST ticagrelor (BRILINTA) tablet 90 mg 90 mg Oral Given --     02/08/2024 0358 EST multi-electrolyte (PLASMALYTE-A/ISOLYTE-S PH 7.4) IV solution 100 mL/hr Intravenous New Bag --     02/08/2024 0818 EST enoxaparin (LOVENOX) subcutaneous injection 40 mg 40 mg Subcutaneous Given --     02/08/2024 0415 EST remdesivir (Veklury) 200 mg in sodium  chloride 0.9 % 290 mL IVPB 200 mg Intravenous New Bag --          Past Medical History:   Diagnosis Date    Depression     Diabetes mellitus (HCC)     patient denies    Dyslipidemia 03/26/2019    GERD (gastroesophageal reflux disease)     Hyperlipidemia     Hypertension     Prediabetes     Prediabetes 04/03/2019    Stroke (HCC)      Present on Admission:   Primary hypertension   GERD (gastroesophageal reflux disease)   History of prediabetes   Focal epilepsy (HCC)   Generalized weakness   COVID   AMS (altered mental status)   (Resolved) Hypokalemia      Admitting Diagnosis: Hypokalemia [E87.6]  Altered mental status [R41.82]  Generalized weakness [R53.1]  Focal epilepsy (HCC) [G40.109]  Ambulatory dysfunction [R26.2]  AMS (altered mental status) [R41.82]  Upper respiratory tract infection due to COVID-19 virus [U07.1, J06.9]  Age/Sex: 64 y.o. male  Admission Orders:  Scheduled Medications:  aspirin, 81 mg, Oral, Daily  atorvastatin, 40 mg, Oral, Daily With Breakfast  donepezil, 10 mg, Oral, Daily  enoxaparin, 40 mg, Subcutaneous, Daily  levETIRAcetam, 500 mg, Oral, Q12H JOSE  losartan, 50 mg, Oral, Daily  melatonin, 5 mg, Oral, HS  metoprolol succinate, 100 mg, Oral, Daily  pantoprazole, 20 mg, Oral, Early Morning  remdesivir, 100 mg, Intravenous, Q24H  tamsulosin, 0.4 mg, Oral, Daily With Dinner  ticagrelor, 90 mg, Oral, Q12H JOSE  traZODone, 100 mg, Oral, HS    Continuous IV Infusions:  multi-electrolyte (PLASMALYTE-A/ISOLYTE-S PH 7.4) IV solution  Rate: 100 mL/hr Dose: 100 mL/hr  Freq: Continuous Route: IV  Indications of Use: IV Hydration  Last Dose: Stopped (02/08/24 1922)  Start: 02/08/24 0300 End: 02/08/24 1157      PRN Meds: not used.   Artificial Tears, 1 drop, Both Eyes, Q4H PRN  baclofen, 5 mg, Oral, BID PRN    PT/OT    None    Network Utilization Review Department  ATTENTION: Please call with any questions or concerns to [phone] and carefully listen to the prompts so that you are directed to the right  person. All voicemails are confidential.   For Discharge needs, contact Care Management DC Support Team at [phone] opt. [phone]  Send all requests for admission clinical reviews, approved or denied determinations and any other requests to dedicated fax number below belonging to the campus where the patient is receiving treatment. List of dedicated fax numbers for the Facilities:  FACILITY NAME UR FAX NUMBER   ADMISSION DENIALS (Administrative/Medical Necessity) [phone]   DISCHARGE SUPPORT TEAM (NETWORK) [phone]   PARENT CHILD HEALTH (Maternity/NICU/Pediatrics) [phone]   [facility] [phone]   [facility] [phone]   [facility] [phone]   [facility] [phone]   [facility] [phone]   [facility] [phone]   [facility] [phone]   [facility] [phone]   [facility] [phone]   [facility] [phone]   General acute hospital [phone]   [facility] [phone]

## 2024-02-13 NOTE — UTILIZATION REVIEW
PLEASE REVIEW AS THE PRIMARY INSURANCE IS [facility] AND MEDICARE PART A IS SECONDARY. AUTHORIZATION NEEDED FOR INPT ADMISSION START DOS-2/9/2024. NOTIFICATION OF ADMISSION AND CLINICALS WERE PREVIOUSLY SUBMITTED ON 2/10/24.

## 2024-02-13 NOTE — PLAN OF CARE
Problem: SAFETY ADULT  Goal: Patient will remain free of falls  Description: INTERVENTIONS:  - Educate patient/family on patient safety including physical limitations  - Instruct patient to call for assistance with activity   - Consult OT/PT to assist with strengthening/mobility   - Keep Call bell within reach  - Keep bed low and locked with side rails adjusted as appropriate  - Keep care items and personal belongings within reach  - Initiate and maintain comfort rounds  - Make Fall Risk Sign visible to staff  - Offer Toileting every 2 Hours, in advance of need  - Initiate/Maintain bed alarm  - Obtain necessary fall risk management equipment: socks   - Apply yellow socks and bracelet for high fall risk patients  - Consider moving patient to room near nurses station  Outcome: Progressing  Goal: Maintain or return to baseline ADL function  Description: INTERVENTIONS:  -  Assess patient's ability to carry out ADLs; assess patient's baseline for ADL function and identify physical deficits which impact ability to perform ADLs (bathing, care of mouth/teeth, toileting, grooming, dressing, etc.)  - Assess/evaluate cause of self-care deficits   - Assess range of motion  - Assess patient's mobility; develop plan if impaired  - Assess patient's need for assistive devices and provide as appropriate  - Encourage maximum independence but intervene and supervise when necessary  - Involve family in performance of ADLs  - Assess for home care needs following discharge   - Consider OT consult to assist with ADL evaluation and planning for discharge  - Provide patient education as appropriate  Outcome: Progressing  Goal: Maintains/Returns to pre admission functional level  Description: INTERVENTIONS:  - Perform AM-PAC 6 Click Basic Mobility/ Daily Activity assessment daily.  - Set and communicate daily mobility goal to care team and patient/family/caregiver.   - Collaborate with rehabilitation services on mobility goals if  consulted  - Perform Range of Motion 3 times a day.  - Reposition patient every 2 hours.  - Dangle patient 3 times a day  - Stand patient 3 times a day  - Ambulate patient 3 times a day  - Out of bed to chair 3 times a day   - Out of bed for meals 3 times a day  - Out of bed for toileting  - Record patient progress and toleration of activity level   Outcome: Progressing

## 2024-02-14 LAB
ANION GAP SERPL CALCULATED.3IONS-SCNC: 7 MMOL/L
BUN SERPL-MCNC: 24 MG/DL (ref 5–25)
CALCIUM SERPL-MCNC: 9.1 MG/DL (ref 8.4–10.2)
CHLORIDE SERPL-SCNC: 107 MMOL/L (ref 96–108)
CO2 SERPL-SCNC: 26 MMOL/L (ref 21–32)
CREAT SERPL-MCNC: 0.79 MG/DL (ref 0.6–1.3)
GFR SERPL CREATININE-BSD FRML MDRD: 94 ML/MIN/1.73SQ M
GLUCOSE SERPL-MCNC: 106 MG/DL (ref 65–140)
POTASSIUM SERPL-SCNC: 3.4 MMOL/L (ref 3.5–5.3)
SODIUM SERPL-SCNC: 140 MMOL/L (ref 135–147)

## 2024-02-14 PROCEDURE — 97535 SELF CARE MNGMENT TRAINING: CPT

## 2024-02-14 PROCEDURE — 97116 GAIT TRAINING THERAPY: CPT

## 2024-02-14 PROCEDURE — 80048 BASIC METABOLIC PNL TOTAL CA: CPT | Performed by: INTERNAL MEDICINE

## 2024-02-14 PROCEDURE — 97530 THERAPEUTIC ACTIVITIES: CPT

## 2024-02-14 PROCEDURE — 99232 SBSQ HOSP IP/OBS MODERATE 35: CPT | Performed by: INTERNAL MEDICINE

## 2024-02-14 RX ORDER — POTASSIUM CHLORIDE 20 MEQ/1
40 TABLET, EXTENDED RELEASE ORAL ONCE
Status: COMPLETED | OUTPATIENT
Start: 2024-02-14 | End: 2024-02-14

## 2024-02-14 RX ADMIN — LEVETIRACETAM 500 MG: 500 TABLET, FILM COATED ORAL at 22:14

## 2024-02-14 RX ADMIN — Medication 5 MG: at 22:13

## 2024-02-14 RX ADMIN — TICAGRELOR 90 MG: 90 TABLET ORAL at 09:49

## 2024-02-14 RX ADMIN — TICAGRELOR 90 MG: 90 TABLET ORAL at 22:14

## 2024-02-14 RX ADMIN — DONEPEZIL HYDROCHLORIDE 10 MG: 5 TABLET ORAL at 09:42

## 2024-02-14 RX ADMIN — TRAZODONE HYDROCHLORIDE 100 MG: 50 TABLET ORAL at 22:14

## 2024-02-14 RX ADMIN — LEVETIRACETAM 500 MG: 500 TABLET, FILM COATED ORAL at 09:43

## 2024-02-14 RX ADMIN — ASPIRIN 81 MG: 81 TABLET, COATED ORAL at 09:42

## 2024-02-14 RX ADMIN — ATORVASTATIN CALCIUM 40 MG: 40 TABLET, FILM COATED ORAL at 07:43

## 2024-02-14 RX ADMIN — PANTOPRAZOLE SODIUM 20 MG: 20 TABLET, DELAYED RELEASE ORAL at 05:50

## 2024-02-14 RX ADMIN — LOSARTAN POTASSIUM 50 MG: 50 TABLET, FILM COATED ORAL at 09:42

## 2024-02-14 RX ADMIN — ENOXAPARIN SODIUM 40 MG: 100 INJECTION SUBCUTANEOUS at 09:43

## 2024-02-14 RX ADMIN — TAMSULOSIN HYDROCHLORIDE 0.4 MG: 0.4 CAPSULE ORAL at 17:31

## 2024-02-14 RX ADMIN — POTASSIUM CHLORIDE 40 MEQ: 1500 TABLET, EXTENDED RELEASE ORAL at 07:43

## 2024-02-14 RX ADMIN — METOPROLOL SUCCINATE 100 MG: 50 TABLET, EXTENDED RELEASE ORAL at 09:41

## 2024-02-14 RX ADMIN — SENNOSIDES AND DOCUSATE SODIUM 1 TABLET: 8.6; 5 TABLET ORAL at 17:31

## 2024-02-14 NOTE — ASSESSMENT & PLAN NOTE
Patient with a history of generalized weakness, prior admission discharged to [facility].  Patient arrives from home.  Typically walks with a walker  PT/OT- recommended str   PT/OT reevaluated.  PT recommended level 3, OT recommended level 2.  Wife concerned about patient being discharged home because he is not as his baseline ambulatory wise.  She can't supervise him at home because she is working.  Wife requested patient to be discharged in a rehab to get his strength back and be able to be more independent and then discharged home.    CM placed referrals for rehab

## 2024-02-14 NOTE — PLAN OF CARE
Problem: PHYSICAL THERAPY ADULT  Goal: Performs mobility at highest level of function for planned discharge setting.  See evaluation for individualized goals.  Description: Treatment/Interventions: Functional transfer training, LE strengthening/ROM, Elevations, Therapeutic exercise, Endurance training, Cognitive reorientation, Patient/family training, Equipment eval/education, Bed mobility, Gait training, Compensatory technique education, Spoke to nursing, Spoke to case management    See flowsheet documentation for full assessment, interventions and recommendations.  Outcome: Progressing  Note: Prognosis: Good  Problem List: Decreased strength, Decreased range of motion, Decreased endurance, Impaired balance, Decreased mobility, Decreased coordination, Decreased cognition, Impaired judgement, Decreased safety awareness, Impaired vision  Assessment: Pt seen for PT treatment 2/14/2024 with focus on: gait training, and completion of standardized outcomes. Pt presents semi-supine in bed, is agreeable to participate in session. Pt participates in therapeutic activity and gait training, with intervention focusing on goal of improved independence and tolerance of functional mobility. Pt initially completes bed mobility with S in hospital bed, transfers with S from EOB, and ambulates 24ft with RW and S. Functional seated rest break provided and then another ambulation trial completed. Pt with quick onset fatigue and for second ambulation trial pt requires NATHALIE to achieve standing, NATHALIE for ambulation of 34ft with RW. During ambulation: pt with decreased gait speed from first trial to second trial, and worsening gait mechanics from first trial to second trial. Gait speeds during both trials qualify pt as a household ambulator, and remains at high risk of falls. Pt completes 5xSTS assessment, completing in 23 seconds, indicating high risk of falls and hospital readmission as compared to age matched peers. Overall pt  displays improved mobility tolerance as compared to previous session, however continues to perform below their baseline level of functional mobility due to weakness, impaired balance, impaired coordination, significant gait mechanics. Pt continues to most appropriately benefit from Level II (moderate PT intensity) resources upon d/c from the acute care setting. Continue skilled PT POC as able and appropriate in order to progress towards therapy goals and maximize independence with functional mobility.    Barriers to Discharge: Decreased caregiver support     Rehab Resource Intensity Level, PT: II (Moderate Resource Intensity)    See flowsheet documentation for full assessment.

## 2024-02-14 NOTE — PLAN OF CARE
Problem: OCCUPATIONAL THERAPY ADULT  Goal: Performs self-care activities at highest level of function for planned discharge setting.  See evaluation for individualized goals.  Description: Treatment Interventions: ADL retraining, Functional transfer training, Visual perceptual retraining, UE strengthening/ROM, Endurance training, Cognitive reorientation, Patient/family training, Continued evaluation, Energy conservation, Activityengagement          See flowsheet documentation for full assessment, interventions and recommendations.   2/14/2024 1141 by Margarita [address] OT  Outcome: Progressing  Note: Limitation: Decreased ADL status, Decreased UE strength, Decreased Safe judgement during ADL, Decreased cognition, Decreased endurance, Visual deficit, Decreased self-care trans  Prognosis: Good  Assessment: Pt seen today for OT follow u p session for auth note. Pt was admitted with altered mental status +COVID 2/7/24 with PMH of seizures,  CVA s/p ICA stent. He presents today with decreased act daxa, balance deficits, decreased act daxa, word retrieval issues, generalized weaknesss and functional cog deficits. He was able to complete bed mobility at Sup level, sit<>stansd initially from bed level at Sup level, as session progressed Pt requires Valery for sit<>stand from recliner. He performs grooming tasks at Bladimir level after assist to place comb correctly in hand. Motor planning and coordination affects all aspects of self help skills. Pt requires MaxA for donning/doffing socks in sitting position, He was able to reach feet however he was  unable to coordinate and sequence the ability to grab sock and remove it. Pt was able to name 3 items when presented to him however he was unable to follow verbal and visual directions to poit to his nose (he continued to poit to eyes). Pt requires s/u for self feeding seated in recliner, minimal spillage noted. Continue skilled OT POC as able and appropriate in order to progress  towards therapy goals and maximize independence with self help skills and  functional mobility.     Rehab Resource Intensity Level, OT: II (Moderate Resource Intensity)       2/14/2024 1141 by Margarita [address] OT  Note: Limitation: Decreased ADL status, Decreased UE strength, Decreased Safe judgement during ADL, Decreased cognition, Decreased endurance, Visual deficit, Decreased self-care trans  Prognosis: Good  Assessment: Pt seen today for OT follow u p session for auth note. Pt was admitted with altered mental status +COVID 2/7/24 with PMH of seizures,  CVA s/p ICA stent. He presents today with decreased act daxa, balance deficits, decreased act daxa, word retrieval issues, generalized weaknesss and functional cog deficits. He was able to complete bed mobility at Sup level, sit<>stansd initially from bed level at Sup level, as session progressed Pt requires Valery for sit<>stand from recliner. He performs grooming tasks at Bladimir level after assist to place comb correctly in hand. Motor planning and coordination affects all aspects of self help skills. Pt requires MaxA for donning/doffing socks in sitting position, He was able to reach feet however he was  unable to coordinate and sequence the ability to grab sock and remove it. Pt was able to name 3 items when presented to him however he was unable to follow verbal and visual directions to poit to his nose (he continued to poit to eyes). Pt requires s/u for self feeding seated in recliner, minimal spillage noted. Continue skilled OT POC as able and appropriate in order to progress towards therapy goals and maximize independence with self help skills and  functional mobility.     Rehab Resource Intensity Level, OT: II (Moderate Resource Intensity)

## 2024-02-14 NOTE — PLAN OF CARE
Problem: PAIN - ADULT  Goal: Verbalizes/displays adequate comfort level or baseline comfort level  Description: Interventions:  - Encourage patient to monitor pain and request assistance  - Assess pain using appropriate pain scale  - Administer analgesics based on type and severity of pain and evaluate response  - Implement non-pharmacological measures as appropriate and evaluate response  - Consider cultural and social influences on pain and pain management  - Notify physician/advanced practitioner if interventions unsuccessful or patient reports new pain  Outcome: Progressing     Problem: INFECTION - ADULT  Goal: Absence or prevention of progression during hospitalization  Description: INTERVENTIONS:  - Assess and monitor for signs and symptoms of infection  - Monitor lab/diagnostic results  - Monitor all insertion sites, i.e. indwelling lines, tubes, and drains  - Monitor endotracheal if appropriate and nasal secretions for changes in amount and color  - [place] appropriate cooling/warming therapies per order  - Administer medications as ordered  - Instruct and encourage patient and family to use good hand hygiene technique  - Identify and instruct in appropriate isolation precautions for identified infection/condition  Outcome: Progressing

## 2024-02-14 NOTE — CASE MANAGEMENT
Case Management Progress Note    Patient name Constanza Zhu  Location /-01 MRN [id number]  : 1960 Date 2024       LOS (days): 5  Geometric Mean LOS (GMLOS) (days): 5  Days to GMLOS:0        OBJECTIVE:        Current admission status: Inpatient  Preferred Pharmacy:   [id number] - [address]  [address] [facility] [address]  Phone: [phone] Fax: [phone]    [facility] - [place], [place] - [address] [place] [address]  [address] [facility] [address]  Phone: [phone] Fax: [phone]    Primary Care Provider: ERIC Calixto    Primary Insurance: BLUE CROSS  Secondary Insurance: MEDICARE    PROGRESS NOTE:  Per Valerio Yee liaison, auth was submitted to insurance. Await determination. Call placed to Pt's wife(Faiza: [phone]), left message to inform that Valerio Yee submitted insurance auth. Await return call.

## 2024-02-14 NOTE — PHYSICAL THERAPY NOTE
PHYSICAL THERAPY TREATMENT  DATE: 02/14/24  TIME: 1248-8775    NAME:  Constanza Zhu  AGE:   64 y.o.  Mrn:   [id number]  Length Of Stay: 5    ADMIT DX:  Hypokalemia [E87.6]  Altered mental status [R41.82]  Generalized weakness [R53.1]  Focal epilepsy (HCC) [G40.109]  Ambulatory dysfunction [R26.2]  AMS (altered mental status) [R41.82]  Upper respiratory tract infection due to COVID-19 virus [U07.1, J06.9]    Past Medical History:   Diagnosis Date    Depression     Diabetes mellitus (HCC)     patient denies    Dyslipidemia 03/26/2019    GERD (gastroesophageal reflux disease)     Hyperlipidemia     Hypertension     Prediabetes     Prediabetes 04/03/2019    Stroke (HCC)      Past Surgical History:   Procedure Laterality Date    ARTHROSCOPIC REPAIR ACL Left     BACK SURGERY      twice    CARPAL TUNNEL RELEASE Right     IR CEREBRAL ANGIOGRAPHY  05/04/2023    IR STROKE ALERT  03/19/2019    SHOULDER SURGERY Left     US GUIDED THYROID BIOPSY  11/21/2023       Performed at least 2 patient identifiers during session: Name, ID bracelet, and Epic photo     02/14/24 1009   PT Last Visit   PT Visit Date 02/14/24   Note Type   Note Type Treatment for insurance authorization   Pain Assessment   Pain Assessment Tool 0-10   Pain Score No Pain   Effect of Pain on Daily Activities n/a   Hospital Pain Intervention(s) Repositioned;Ambulation/increased activity   Multiple Pain Sites No   Restrictions/Precautions   Weight Bearing Precautions Per Order No   Other Precautions Contact/isolation;Airborne/isolation;Cognitive;Chair Alarm;Bed Alarm;Aspiration;Seizure;Fall Risk;Hard of hearing   General   Chart Reviewed Yes   Additional Pertinent History No significant change from previous session   Response to Previous Treatment Patient with no complaints from previous session.   Family/Caregiver Present No   Cognition   Overall Cognitive Status Impaired   Arousal/Participation Alert;Cooperative   Attention Attends with cues to redirect  "  Orientation Level Oriented to person;Oriented to place   Memory Decreased short term memory;Decreased recall of recent events;Decreased recall of precautions   Following Commands Follows one step commands with increased time or repetition   Subjective   Subjective \"My walking is getting better.\"   Five Times Sit To Stand   Time (Seconds) 23 Seconds   Self Selected Walking Speed   SSWS Trial 1 (Seconds) 25 Seconds   SSWS Trial 2 (Seconds) 24.9 Seconds   SSWS Trial 3 (Seconds) 25.5 Seconds   SSWS Average Time (Seconds) 25.1 seconds   SSWS Average Score (m/sec) 0.2 m/sec   Bed Mobility   Supine to Sit 5  Supervision   Additional items Assist x 1;HOB elevated;Bedrails;Increased time required;Verbal cues   Sit to Supine   (NT as pt was left seated OOB in recliner chair at end of session)   Transfers   Sit to Stand 4  Minimal assistance  (CGA)   Additional items Assist x 1;Increased time required;Armrests;Verbal cues  (RW)   Stand to Sit 4  Minimal assistance  (CGA)   Additional items Assist x 1;Armrests;Increased time required;Verbal cues  (RW)   Stand pivot 4  Minimal assistance   Additional items Assist x 1;Increased time required;Verbal cues  (RW)   Additional Comments Pt initially completes transfers from EOB with S and increased time. However pt with quick onset fatigue and regresses to require NATHALIE for additional transfers t/o session.   Ambulation/Elevation   Gait pattern Improper Weight shift;Forward Flexion;Wide NEREIDA;Decreased foot clearance;Short stride;Excessively slow  (R foot drag with fatigue)   Gait Assistance 4  Minimal assist   Additional items Assist x 1;Verbal cues;Tactile cues   Assistive Device Rolling walker   Distance 24ft + 34ft with seated functional rest break between   Stair Management Assistance Not tested   Ambulation/Elevation Additional Comments Pt completed 2 ambulation trials on this date: 1st - with RW and close S, gait speed of 0.3m/s, slight gait deviations however no overt " "instability or LOB. 2nd - with RW and NATHALIE (due to fatigue and worsening gait mechanics, specifically worsening R LE drag and poor RW proximity), gait speed of 0.2m/s (declined from initial trial).   Balance   Static Sitting Fair +   Dynamic Sitting Fair   Static Standing Fair +  (w/ RW)   Dynamic Standing Fair  (w/ RW)   Ambulatory Fair  (w/ RW)   Endurance Deficit   Endurance Deficit Yes   Endurance Deficit Description Limited ambulation / mobility endurance. Pt reports being fatigued after short distance amb trial #1, and reports being completely \"wiped out\" after ambulation trial #2.   Activity Tolerance   Activity Tolerance Patient limited by fatigue   Medical Staff Made Aware Spoke with CM, OT   Nurse Made Aware Spoke with RN Ana   Assessment   Prognosis Good   Problem List Decreased strength;Decreased range of motion;Decreased endurance;Impaired balance;Decreased mobility;Decreased coordination;Decreased cognition;Impaired judgement;Decreased safety awareness;Impaired vision   Assessment Pt seen for PT treatment 2/14/2024 with focus on: gait training, and completion of standardized outcomes. Pt presents semi-supine in bed, is agreeable to participate in session. Pt participates in therapeutic activity and gait training, with intervention focusing on goal of improved independence and tolerance of functional mobility. Pt initially completes bed mobility with S in hospital bed, transfers with S from EOB, and ambulates 24ft with RW and S. Functional seated rest break provided and then another ambulation trial completed. Pt with quick onset fatigue and for second ambulation trial pt requires NATHALIE to achieve standing, NATAHLIE for ambulation of 34ft with RW. During ambulation: pt with decreased gait speed from first trial to second trial, and worsening gait mechanics from first trial to second trial. Gait speeds during both trials qualify pt as a household ambulator, and remains at high risk of falls. Pt completes " "5xSTS assessment, completing in 23 seconds, indicating high risk of falls and hospital readmission as compared to age matched peers. Overall pt displays improved mobility tolerance as compared to previous session, however continues to perform below their baseline level of functional mobility due to weakness, impaired balance, impaired coordination, significant gait mechanics. Pt continues to most appropriately benefit from Level II (moderate PT intensity) resources upon d/c from the acute care setting. Continue skilled PT POC as able and appropriate in order to progress towards therapy goals and maximize independence with functional mobility.   Barriers to Discharge Decreased caregiver support   Goals   Patient Goals \"to get my speech better\"   STG Expiration Date 02/22/24   Short Term Goal #1 Patient PT goals established in order to maximize functional independence and decrease burden of care. Pt will: complete all bed mobility in hospital bed with S in order to promote increased OOB functional mobility and simulate home environment; complete all transfers with RW and S in order to increase safety with functional mobility; ambulate >50ft with RW and S in order to increase safety with household distance functional mobility; negotiate 1 standard height step with RW and no greater than NATHALIE in order to facilitate safe access to his home; demonstrate understanding and independence with LE strengthening HEP; improve ambulatory balance to >/= fair+ grade in order to promote safety and increased independence with mobility; improve AM-PAC score to >/= 17/24 in order to increase independence with mobility and decrease burden of care; improve Barthel Index score to >/= 40/100 in order to increase independence and decrease risk of falls.   PT Treatment Day 2   Plan   Treatment/Interventions Functional transfer training;LE strengthening/ROM;Elevations;Therapeutic exercise;Endurance training;Cognitive " reorientation;Patient/family training;Equipment eval/education;Bed mobility;Gait training;Compensatory technique education;Spoke to nursing;Spoke to case management   Progress Progressing toward goals   PT Frequency 3-5x/wk   Discharge Recommendation   Rehab Resource Intensity Level, PT II (Moderate Resource Intensity)   AM-PAC Basic Mobility Inpatient   Turning in Flat Bed Without Bedrails 3   Lying on Back to Sitting on Edge of Flat Bed Without Bedrails 3   Moving Bed to Chair 3   Standing Up From Chair Using Arms 3   Walk in Room 3   Climb 3-5 Stairs With Railing 2   Basic Mobility Inpatient Raw Score 17   Basic Mobility Standardized Score 39.67   Highest Level Of Mobility   -HLM Goal 5: Stand one or more mins   JH-HLM Achieved 7: Walk 25 feet or more   Education   Education Provided Mobility training;Assistive device   Patient Demonstrates verbal understanding;Reinforcement needed   End of Consult   Patient Position at End of Consult Bedside chair;Bed/Chair alarm activated;All needs within reach     Cut off scores:  All data taken from APTA Neuro Section or Rehab Measures.    5xSTS:   Rosa Maria et al 2010  MDC: 2.3 sec  Age Norms:  60-69: 11.1 sec  70-79: 12.6 sec  80-89: 14.8 sec    Gait Speed Interpretation:  Gain of 0.1 m/s is a predictor of well-being in those w/ abnormal walking speed compared to age matched peers  <0.7m/s is at an increased risk for falls  Household ambulator: <0.4 m/s  Limited community ambulator: 0.4-0.8 m/s  Community ambulator: 0.8-1.2 m/s  Able to safely cross streets: >1.2 m/s      Based on patient's [facility] Highest Level of Mobility scores today, patient currently has a goal of -HLM Levels: 7: WALK 25 FEET OR MORE, to be completed with RN staffing each shift, in order to improve overall activity tolerance and mobility, combat hospital related deconditioning, and maximize outcomes for d/c from the acute care setting.     The patient's AM-PAC Basic Mobility Inpatient Short Form  Raw Score is 17. A Raw score of greater than 16 suggests the patient may benefit from discharge to home. Please also refer to the recommendation of the Physical Therapist for safe discharge planning.      Sunshine Travis PT, DPT   Available via [employer]  NPI # [id number]  [place] License - [id number]  2/14/2024

## 2024-02-14 NOTE — OCCUPATIONAL THERAPY NOTE
Occupational Therapy Progress Note     Patient Name: Constanza Zhu  Today's Date: 2/14/2024  Problem List  Principal Problem:    COVID  Active Problems:    History of stroke    Primary hypertension    GERD (gastroesophageal reflux disease)    History of prediabetes    Focal epilepsy (HCC)    Generalized weakness    AMS (altered mental status)          02/14/24 1010   OT Last Visit   OT Visit Date 02/14/24   Note Type   Note Type Treatment for insurance authorization   Pain Assessment   Pain Assessment Tool 0-10   Pain Score No Pain   Effect of Pain on Daily Activities N/A   Hospital Pain Intervention(s) Repositioned;Ambulation/increased activity   Multiple Pain Sites No   Restrictions/Precautions   Weight Bearing Precautions Per Order No   Other Precautions Contact/isolation;Airborne/isolation;Cognitive;Chair Alarm;Bed Alarm;Aspiration;Seizure;Fall Risk;Hard of hearing   Lifestyle   Autonomy Lives with wife in a 1STH, 1 CHRIS assist from wife for ADLs, dep IADLs, RW mobility   Reciprocal Relationships supportive wife   ADL   Where Assessed Edge of bed   Eating Assistance 5  Supervision/Setup   Eating Deficit Verbal cueing;Increased time to complete  (spillage noted)   Grooming Assistance 4  Minimal Assistance   Grooming Deficit Brushing hair;Verbal cueing;Increased time to complete   Grooming Comments assist for placement of comb in hand   LB Bathing Assistance 3  Moderate Assistance   UB Dressing Assistance 3  Moderate Assistance   UB Dressing Comments gown management   LB Dressing Assistance 2  Maximal Assistance   LB Dressing Deficit Don/doff R sock;Don/doff L sock;Verbal cueing;Increased time to complete   Bed Mobility   Supine to Sit 5  Supervision   Additional items Assist x 1;HOB elevated;Bedrails;Increased time required;Verbal cues   Transfers   Sit to Stand 4  Minimal assistance   Additional items Assist x 1;Increased time required;Armrests;Verbal cues   Stand to Sit 4  Minimal assistance   Additional  items Assist x 1;Increased time required;Armrests;Verbal cues   Stand pivot 4  Minimal assistance   Additional items Assist x 1;Increased time required;Armrests;Verbal cues   Additional Comments sit to stand from bed completed at Sup level, as session progressed Pt requires Valery for sit<>stand from recliner   Functional Mobility   Functional Mobility 4  Minimal assistance   Additional Comments Ax1 with RW   Additional items Rolling walker   Cognition   Overall Cognitive Status Impaired   Arousal/Participation Alert;Cooperative   Attention Attends with cues to redirect   Orientation Level Oriented to person;Oriented to place   Memory Decreased short term memory;Decreased recall of recent events;Decreased recall of precautions   Following Commands Follows one step commands with increased time or repetition   Activity Tolerance   Activity Tolerance Patient limited by fatigue   Assessment   Assessment Pt seen today for OT follow u p session for auth note. Pt was admitted with altered mental status +COVID 2/7/24 with PMH of seizures,  CVA s/p ICA stent. He presents today with decreased act daxa, balance deficits, decreased act daxa, word retrieval issues, generalized weakness and functional cog deficits. He was able to complete bed mobility at Sup level, sit<>stand initially from bed level at Sup level, as session progressed Pt requires Valery for sit<>stand from recliner. He performs grooming tasks at Bladimir level after assist to place comb correctly in hand. Motor planning and coordination affects all aspects of self help skills. Pt requires MaxA for donning/doffing socks in sitting position, He was able to reach feet however he was  unable to coordinate and sequence the ability to grab sock and remove it. Pt was able to name 3 items when presented to him however he was unable to follow verbal and visual directions to point to his nose (he continued to point to eyes). Pt requires s/u for self feeding seated in recliner,  minimal spillage noted. Continue skilled OT POC as able and appropriate in order to progress towards therapy goals and maximize independence with self help skills and  functional mobility.   Plan   Treatment Interventions ADL retraining;Functional transfer training;Visual perceptual retraining;UE strengthening/ROM;Endurance training;Cognitive reorientation;Patient/family training;Continued evaluation;Energy conservation;Activityengagement   Goal Expiration Date 02/22/24   OT Treatment Day 2   OT Frequency 3-5x/wk   Discharge Recommendation   Rehab Resource Intensity Level, OT II (Moderate Resource Intensity)   AM-PAC Daily Activity Inpatient   Lower Body Dressing 2   Bathing 3   Toileting 3   Upper Body Dressing 3   Grooming 3   Eating 3   Daily Activity Raw Score 17   Daily Activity Standardized Score (Calc for Raw Score >=11) 37.26   AM-PAC Applied Cognition Inpatient   Following a Speech/Presentation 1   Understanding Ordinary Conversation 3   Taking Medications 1   Remembering Where Things Are Placed or Put Away 2   Remembering List of 4-5 Errands 1   Taking Care of Complicated Tasks 1   Applied Cognition Raw Score 9   Applied Cognition Standardized Score 22.48   End of Consult   Education Provided Yes   Patient Position at End of Consult Seated edge of bed;Bed/Chair alarm activated;All needs within reach   Nurse Communication Nurse aware of consult       GOALS:      -Patient will perform grooming tasks oral care with overall Supervision in order to increase overall independence -PROGRESSING     -Patient will be Supervision with UB dressing using AE and AD as needed in order to increase (I) with ADLs-PROGRESSING     -Patient will be Min A  with UB bathing using AE and AD as needed in order to increase (I) with ADLs -PROGRESSING    -Patient will be Mod A  with LB dressing with use of AE and AD as needed in order to increase (I) with ADLs-PROGRESSING     -Patient will be Mod A  with LB bathing with use of AE and  AD as needed in order to increase (I) with ADLs-PROGRESSING     -Patient will complete toileting w/ Min A  w/ G hygiene/thoroughness in order to reduce caregiver burden-PROGRESSING     -Patient will demonstrate Supervision with bed mobility for ability to manage own comfort and initiate OOB tasks. -PROGRESSING     -Patient will perform functional transfers with Supervision to/from all surfaces using DME as needed in order to increase (I) with functional tasks-PROGRESSING     -Patient will be Supervision with functional mobility to/from bathroom for increased independence with toileting tasks-PROGRESSING     -Patient will tolerate therapeutic activities for greater than 15 min, in order to increase tolerance for functional activities. -PROGRESSING     -Patient will increase OOB/sitting tolerance to 2-4 hours per day to increase participation in self-care and leisure tasks with no s/s of exertion. -PROGRESSING     COGNITION     -Patient will engage in ongoing cognitive assessment in order to assist with safe discharge planning/recommendations.     -Patient will follow 2 step instructions with no VC in order to safely complete functional tasks-PROGRESSING     -Patient will attend to functional task for 3 min without VC for attention/redirection -PROGRESSING     -Patient will complete dressing tasks with maximum of 3 verbal and/or visual cues in order to compensate for STM deficits-PROGRESSING     -Patient’s caregivers will be independent with providing appropriate cognitive cues in order to facilitate patient’s independence during functional tasks.        -Patient will participate in 10m UE therex to increase overall stamina/activity tolerance for purposeful tasks

## 2024-02-14 NOTE — PROGRESS NOTES
[facility]  Progress Note  Name: Constanza Zhu I  MRN: [id number]  Unit/Bed#: -01 I Date of Admission: 2/7/2024   Date of Service: 2/14/2024 I Hospital Day: 5    Assessment/Plan   AMS (altered mental status)  Assessment & Plan  Patient with history of NPH, hypertension induced encephalopathy, recent diagnosis of COVID was found to have increased lethargy and confusion around 3 PM this afternoon when his wife came to check on him.  Baseline GCS is typically greater than 12 given prior history of multiple strokes.  GCS at time of admission 13, patient is only oriented to self  CT head and neck:  No changes from prior imaging studies, no intracranial hemorrhage or mass, stable old infarcts  Altered mental status likely in the setting of COVID infection, mental status returned to baseline  Continue to monitor neuro status  Currently at baseline     Generalized weakness  Assessment & Plan  Patient with a history of generalized weakness, prior admission discharged to [facility].  Patient arrives from home.  Typically walks with a walker  PT/OT- recommended str   PT/OT reevaluated.  PT recommended level 3, OT recommended level 2.  Wife concerned about patient being discharged home because he is not as his baseline ambulatory wise.  She can't supervise him at home because she is working.  Wife requested patient to be discharged in a rehab to get his strength back and be able to be more independent and then discharged home.    CM placed referrals for rehab    Focal epilepsy (HCC)  Assessment & Plan  Patient with history of staring episodes, felt to be absence seizure's  Follows with neurology as outpatient  No acute events  continue Keppra 500 Mg    History of prediabetes  Assessment & Plan  Continue carb controlled diet and monitor sugars with BMP  Sugars remained stable    GERD (gastroesophageal reflux disease)  Assessment & Plan  Continue Protonix 20 mg    Primary  hypertension  Assessment & Plan  Continue losartan and metoprolol  Patient hypertensive on admission, as needed labetalol IV for SBP greater than 180  Blood pressure control remains stable    History of stroke  Assessment & Plan  Patient with history of bilateral MCA strokes, with right stenosis and left stenosis with left ICA stent placement in May 2023  Baseline GCS 12, per EMS  GCS at time of admission 13, eye-opening to sound, confused, obeying commands  Follows with neurology outpatient  Per last neurology note BP goal of <130/80  Continue aspirin, Plavix, statin    * COVID  Assessment & Plan  Patient admitted in October 2023 for sepsis due to COVID, given IV remdesivir  Patient noted to have a fever of 101 at home today and was given 2 Tylenol,  Recurrent fever last evening of 100.6  Patient remains on room air  Completed remdesivir    Hypokalemia-resolved as of 2/12/2024  Assessment & Plan  Patient with potassium of 3.2 on admission  Received 40 mill equivalents potassium in the ED  Hold home HTZ 12.5mg  Hypokalemia resolved          VTE Pharmacologic Prophylaxis: VTE Score: 8 High Risk (Score >/= 5) - Pharmacological DVT Prophylaxis Ordered: enoxaparin (Lovenox). Sequential Compression Devices Ordered.    Mobility:   Basic Mobility Inpatient Raw Score: 17  JH-HLM Goal: 5: Stand one or more mins  JH-HLM Achieved: 7: Walk 25 feet or more  HLM Goal NOT achieved. Continue with multidisciplinary rounding and encourage appropriate mobility to improve upon HLM goals.    Patient Centered Rounds: I performed bedside rounds with nursing staff today.   Discussions with Specialists or Other Care Team Provider: cm    Education and Discussions with Family / Patient: Updated  (wife) via phone.    Total Time Spent on Date of Encounter in care of patient: 30 mins. This time was spent on one or more of the following: performing physical exam; counseling and coordination of care; obtaining or reviewing history;  documenting in the medical record; reviewing/ordering tests, medications or procedures; communicating with other healthcare professionals and discussing with patient's family/caregivers.    Current Length of Stay: 5 day(s)  Current Patient Status: Inpatient   Certification Statement:  waiting for placement  Discharge Plan: Anticipate discharge in 24-48 hrs to rehab facility.    Code Status: Level 1 - Full Code    Subjective:     No symptoms     Objective:     Vitals:   Temp (24hrs), Av.3 °F (36.8 °C), Min:98.3 °F (36.8 °C), Max:98.3 °F (36.8 °C)    Temp:  [98.3 °F (36.8 °C)] 98.3 °F (36.8 °C)  HR:  [70-73] 72  Resp:  [16] 16  BP: (130-139)/(65-68) 130/67  SpO2:  [97 %-99 %] 99 %  Body mass index is 27.55 kg/m².     Input and Output Summary (last 24 hours):     Intake/Output Summary (Last 24 hours) at 2024 1537  Last data filed at 2024 1424  Gross per 24 hour   Intake 900 ml   Output --   Net 900 ml       Physical Exam:   Physical Exam  Vitals and nursing note reviewed.   Constitutional:       General: He is not in acute distress.     Appearance: He is not diaphoretic.   HENT:      Head: Normocephalic.   Eyes:      General: No scleral icterus.        Right eye: No discharge.         Left eye: No discharge.   Cardiovascular:      Rate and Rhythm: Normal rate and regular rhythm.   Pulmonary:      Effort: Pulmonary effort is normal. No respiratory distress.      Breath sounds: Normal breath sounds. No wheezing, rhonchi or rales.   Abdominal:      General: There is no distension.      Palpations: Abdomen is soft.      Tenderness: There is no abdominal tenderness. There is no guarding or rebound.   Musculoskeletal:      Cervical back: Normal range of motion.      Right lower leg: No edema.      Left lower leg: No edema.   Skin:     General: Skin is warm.   Neurological:      Mental Status: He is alert.   Psychiatric:         Mood and Affect: Mood normal.         Behavior: Behavior normal.             Additional Data:     Labs:  Results from last 7 days   Lab Units 02/13/24  0456 02/10/24  0358   WBC Thousand/uL 10.90* 10.76*   HEMOGLOBIN g/dL 13.1 13.7   HEMATOCRIT % 39.2 41.3   PLATELETS Thousands/uL 254 185   NEUTROS PCT %  --  67   LYMPHS PCT %  --  16   MONOS PCT %  --  13*   EOS PCT %  --  2     Results from last 7 days   Lab Units 02/14/24  0426 02/11/24  0426 02/10/24  0358   SODIUM mmol/L 140   < > 134*   POTASSIUM mmol/L 3.4*   < > 3.7   CHLORIDE mmol/L 107   < > 103   CO2 mmol/L 26   < > 21   BUN mg/dL 24   < > 26*   CREATININE mg/dL 0.79   < > 0.93   ANION GAP mmol/L 7   < > 10   CALCIUM mg/dL 9.1   < > 8.9   ALBUMIN g/dL  --   --  3.7   TOTAL BILIRUBIN mg/dL  --   --  0.63   ALK PHOS U/L  --   --  66   ALT U/L  --   --  15   AST U/L  --   --  21   GLUCOSE RANDOM mg/dL 106   < > 96    < > = values in this interval not displayed.                 Results from last 7 days   Lab Units 02/08/24  0708   PROCALCITONIN ng/ml 0.06       Lines/Drains:  Invasive Devices       Peripheral Intravenous Line  Duration             Peripheral IV 02/13/24 Distal;Right;Upper;Ventral (anterior) Arm 1 day              Drain  Duration             External Urinary Catheter Small 5 days                          Imaging: No pertinent imaging reviewed.    Recent Cultures (last 7 days):         Last 24 Hours Medication List:   Current Facility-Administered Medications   Medication Dose Route Frequency Provider Last Rate    Artificial Tears  1 drop Both Eyes Q4H PRN Spenser Lopez MD      aspirin  81 mg Oral Daily Margarita Torres PA-C      atorvastatin  40 mg Oral Daily With Breakfast Margarita Torres PA-C      baclofen  5 mg Oral BID PRN Margarita Torres PA-C      donepezil  10 mg Oral Daily Margarita Torres PA-C      enoxaparin  40 mg Subcutaneous Daily Margarita Torres PA-C      levETIRAcetam  500 mg Oral Q12H [facility] Margarita Torres PA-C      losartan  50 mg Oral Daily Margarita Torres PA-C      melatonin   5 mg Oral HS Margarita Torres PA-C      metoprolol succinate  100 mg Oral Daily Margarita Torres PA-C      pantoprazole  20 mg Oral Early Morning Margarita Torres PA-C      polyethylene glycol  17 g Oral Daily Pilar Glaser MD      senna-docusate sodium  1 tablet Oral BID Pilar Glaser MD      tamsulosin  0.4 mg Oral Daily With Dinner Margarita Torres PA-C      ticagrelor  90 mg Oral Q12H JOSE Margarita Torres PA-C      traZODone  100 mg Oral HS Margarita Torres PA-C          Today, Patient Was Seen By: Pilar Glaser MD    **Please Note: This note may have been constructed using a voice recognition system.**

## 2024-02-15 LAB
ANION GAP SERPL CALCULATED.3IONS-SCNC: 7 MMOL/L
BUN SERPL-MCNC: 22 MG/DL (ref 5–25)
CALCIUM SERPL-MCNC: 8.8 MG/DL (ref 8.4–10.2)
CHLORIDE SERPL-SCNC: 108 MMOL/L (ref 96–108)
CO2 SERPL-SCNC: 24 MMOL/L (ref 21–32)
CREAT SERPL-MCNC: 0.85 MG/DL (ref 0.6–1.3)
GFR SERPL CREATININE-BSD FRML MDRD: 91 ML/MIN/1.73SQ M
GLUCOSE SERPL-MCNC: 102 MG/DL (ref 65–140)
POTASSIUM SERPL-SCNC: 3.5 MMOL/L (ref 3.5–5.3)
SODIUM SERPL-SCNC: 139 MMOL/L (ref 135–147)

## 2024-02-15 PROCEDURE — 99232 SBSQ HOSP IP/OBS MODERATE 35: CPT | Performed by: INTERNAL MEDICINE

## 2024-02-15 PROCEDURE — 80048 BASIC METABOLIC PNL TOTAL CA: CPT | Performed by: INTERNAL MEDICINE

## 2024-02-15 RX ADMIN — PANTOPRAZOLE SODIUM 20 MG: 20 TABLET, DELAYED RELEASE ORAL at 06:21

## 2024-02-15 RX ADMIN — DONEPEZIL HYDROCHLORIDE 10 MG: 5 TABLET ORAL at 09:52

## 2024-02-15 RX ADMIN — ASPIRIN 81 MG: 81 TABLET, COATED ORAL at 09:52

## 2024-02-15 RX ADMIN — LOSARTAN POTASSIUM 50 MG: 50 TABLET, FILM COATED ORAL at 09:53

## 2024-02-15 RX ADMIN — SENNOSIDES AND DOCUSATE SODIUM 1 TABLET: 8.6; 5 TABLET ORAL at 17:26

## 2024-02-15 RX ADMIN — TICAGRELOR 90 MG: 90 TABLET ORAL at 09:53

## 2024-02-15 RX ADMIN — SENNOSIDES AND DOCUSATE SODIUM 1 TABLET: 8.6; 5 TABLET ORAL at 09:52

## 2024-02-15 RX ADMIN — TAMSULOSIN HYDROCHLORIDE 0.4 MG: 0.4 CAPSULE ORAL at 17:26

## 2024-02-15 RX ADMIN — TICAGRELOR 90 MG: 90 TABLET ORAL at 21:57

## 2024-02-15 RX ADMIN — ATORVASTATIN CALCIUM 40 MG: 40 TABLET, FILM COATED ORAL at 09:52

## 2024-02-15 RX ADMIN — METOPROLOL SUCCINATE 100 MG: 50 TABLET, EXTENDED RELEASE ORAL at 09:52

## 2024-02-15 RX ADMIN — TRAZODONE HYDROCHLORIDE 100 MG: 50 TABLET ORAL at 21:55

## 2024-02-15 RX ADMIN — ENOXAPARIN SODIUM 40 MG: 100 INJECTION SUBCUTANEOUS at 09:53

## 2024-02-15 RX ADMIN — LEVETIRACETAM 500 MG: 500 TABLET, FILM COATED ORAL at 21:55

## 2024-02-15 RX ADMIN — Medication 5 MG: at 21:55

## 2024-02-15 RX ADMIN — LEVETIRACETAM 500 MG: 500 TABLET, FILM COATED ORAL at 09:52

## 2024-02-15 NOTE — CASE MANAGEMENT
Case Management Progress Note    Patient name Constanza Zhu  Location /-01 MRN [id number]  : 1960 Date 2/15/2024       LOS (days): 6  Geometric Mean LOS (GMLOS) (days): 5  Days to GMLOS:-1.1        OBJECTIVE:        Current admission status: Inpatient  Preferred Pharmacy:   [facility] - [address] [address]  Phone: [phone] Fax: [phone]    [facility] - [place], [place] - [facility] [address] [address]  Phone: [phone] Fax: [phone]    Primary Care Provider: ERIC Calixto    Primary Insurance: BLUE CROSS  Secondary Insurance: MEDICARE    PROGRESS NOTE:    Per [facility] liaison, authorization is pending.  CM provided update to patients wife Kary

## 2024-02-15 NOTE — PLAN OF CARE
Problem: MOBILITY - ADULT  Goal: Maintain or return to baseline ADL function  Description: INTERVENTIONS:  -  Assess patient's ability to carry out ADLs; assess patient's baseline for ADL function and identify physical deficits which impact ability to perform ADLs (bathing, care of mouth/teeth, toileting, grooming, dressing, etc.)  - Assess/evaluate cause of self-care deficits   - Assess range of motion  - Assess patient's mobility; develop plan if impaired  - Assess patient's need for assistive devices and provide as appropriate  - Encourage maximum independence but intervene and supervise when necessary  - Involve family in performance of ADLs  - Assess for home care needs following discharge   - Consider OT consult to assist with ADL evaluation and planning for discharge  - Provide patient education as appropriate  Outcome: Progressing  Goal: Maintains/Returns to pre admission functional level  Description: INTERVENTIONS:  - Perform AM-PAC 6 Click Basic Mobility/ Daily Activity assessment daily.  - Set and communicate daily mobility goal to care team and patient/family/caregiver.   - Collaborate with rehabilitation services on mobility goals if consulted  - Perform Range of Motion 3 times a day.  - Reposition patient every 2 hours.  - Dangle patient 3 times a day  - Stand patient 3 times a day  - Ambulate patient 3 times a day  - Out of bed to chair 3 times a day   - Out of bed for meals 3 times a day  - Out of bed for toileting  - Record patient progress and toleration of activity level   Outcome: Progressing

## 2024-02-15 NOTE — PROGRESS NOTES
[facility]  Progress Note  Name: Constanza Zhu I  MRN: [id number]  Unit/Bed#: -01 I Date of Admission: 2/7/2024   Date of Service: 2/15/2024 I Hospital Day: 6    Assessment/Plan   AMS (altered mental status)  Assessment & Plan  Patient with history of NPH, hypertension induced encephalopathy, recent diagnosis of COVID was found to have increased lethargy and confusion around 3 PM this afternoon when his wife came to check on him.  Baseline GCS is typically greater than 12 given prior history of multiple strokes.  GCS at time of admission 13, patient is only oriented to self  CT head and neck:  No changes from prior imaging studies, no intracranial hemorrhage or mass, stable old infarcts  Altered mental status likely in the setting of COVID infection, mental status returned to baseline  Continue to monitor neuro status  Currently at baseline     Generalized weakness  Assessment & Plan  Patient with a history of generalized weakness, prior admission discharged to [facility].  Patient arrives from home.  Typically walks with a walker  PT/OT- recommended rehab   CM placed referrals for rehab  Waiting for placement    Focal epilepsy (HCC)  Assessment & Plan  Patient with history of staring episodes, felt to be absence seizure's  Follows with neurology as outpatient  No acute events  continue Keppra 500 Mg    History of prediabetes  Assessment & Plan  Continue carb controlled diet and monitor sugars with BMP  Sugars remained stable    GERD (gastroesophageal reflux disease)  Assessment & Plan  Continue Protonix 20 mg    Primary hypertension  Assessment & Plan  Continue losartan and metoprolol  Patient hypertensive on admission, as needed labetalol IV for SBP greater than 180  Blood pressure control remains stable    History of stroke  Assessment & Plan  Patient with history of bilateral MCA strokes, with right stenosis and left stenosis with left ICA stent placement in May  2023  Baseline GCS 12, per EMS  GCS at time of admission 13, eye-opening to sound, confused, obeying commands  Follows with neurology outpatient  Per last neurology note BP goal of <130/80  Continue aspirin, Plavix, statin    * COVID  Assessment & Plan  Patient admitted in October 2023 for sepsis due to COVID, given IV remdesivir  Patient noted to have a fever of 101 at home today and was given 2 Tylenol,  Recurrent fever last evening of 100.6  Patient remains on room air  Completed remdesivir    Hypokalemia-resolved as of 2/12/2024  Assessment & Plan  Patient with potassium of 3.2 on admission  Received 40 mill equivalents potassium in the ED  Hold home HTZ 12.5mg  Hypokalemia resolved               VTE Pharmacologic Prophylaxis: VTE Score: 8 High Risk (Score >/= 5) - Pharmacological DVT Prophylaxis Ordered: enoxaparin (Lovenox). Sequential Compression Devices Ordered.    Mobility:   Basic Mobility Inpatient Raw Score: 18  JH-HLM Goal: 6: Walk 10 steps or more  JH-HLM Achieved: 7: Walk 25 feet or more  HLM Goal NOT achieved. Continue with multidisciplinary rounding and encourage appropriate mobility to improve upon HLM goals.    Patient Centered Rounds: I performed bedside rounds with nursing staff today.   Discussions with Specialists or Other Care Team Provider: cm    Education and Discussions with Family / Patient: Updated  (wife) via phone.    Total Time Spent on Date of Encounter in care of patient: 35 mins. This time was spent on one or more of the following: performing physical exam; counseling and coordination of care; obtaining or reviewing history; documenting in the medical record; reviewing/ordering tests, medications or procedures; communicating with other healthcare professionals and discussing with patient's family/caregivers.    Current Length of Stay: 6 day(s)  Current Patient Status: Inpatient   Certification Statement: The patient will continue to require additional inpatient  hospital stay due to waiting for placement   Discharge Plan: Anticipate discharge tomorrow to prior assisted or independent living facility.    Code Status: Level 1 - Full Code    Subjective:     Patient had no complaints today.  He is tired but ate his breakfast.  Could not remember when was his last bowel movement    Objective:     Vitals:   Temp (24hrs), Av.4 °F (36.9 °C), Min:98.2 °F (36.8 °C), Max:98.7 °F (37.1 °C)    Temp:  [98.2 °F (36.8 °C)-98.7 °F (37.1 °C)] 98.7 °F (37.1 °C)  HR:  [75-77] 76  Resp:  [16-18] 16  BP: (121-134)/(71-73) 121/71  SpO2:  [96 %-97 %] 97 %  Body mass index is 27.55 kg/m².     Input and Output Summary (last 24 hours):     Intake/Output Summary (Last 24 hours) at 2/15/2024 1551  Last data filed at 2/15/2024 1430  Gross per 24 hour   Intake 720 ml   Output 400 ml   Net 320 ml       Physical Exam:   Physical Exam  Vitals and nursing note reviewed.   Constitutional:       General: He is not in acute distress.     Appearance: He is not diaphoretic.   HENT:      Head: Normocephalic.   Eyes:      General:         Right eye: No discharge.         Left eye: No discharge.   Cardiovascular:      Rate and Rhythm: Normal rate and regular rhythm.   Pulmonary:      Effort: Pulmonary effort is normal. No respiratory distress.      Breath sounds: Normal breath sounds. No wheezing, rhonchi or rales.   Abdominal:      General: There is no distension.      Palpations: Abdomen is soft.      Tenderness: There is no abdominal tenderness. There is no guarding or rebound.   Musculoskeletal:      Cervical back: Normal range of motion.      Right lower leg: No edema.      Left lower leg: No edema.   Skin:     General: Skin is warm.   Neurological:      Mental Status: He is alert.   Psychiatric:         Mood and Affect: Mood normal.         Behavior: Behavior normal.          Additional Data:     Labs:  Results from last 7 days   Lab Units 24  0456 02/10/24  0358   WBC Thousand/uL 10.90* 10.76*    HEMOGLOBIN g/dL 13.1 13.7   HEMATOCRIT % 39.2 41.3   PLATELETS Thousands/uL 254 185   NEUTROS PCT %  --  67   LYMPHS PCT %  --  16   MONOS PCT %  --  13*   EOS PCT %  --  2     Results from last 7 days   Lab Units 02/15/24  0256 02/11/24  0426 02/10/24  0358   SODIUM mmol/L 139   < > 134*   POTASSIUM mmol/L 3.5   < > 3.7   CHLORIDE mmol/L 108   < > 103   CO2 mmol/L 24   < > 21   BUN mg/dL 22   < > 26*   CREATININE mg/dL 0.85   < > 0.93   ANION GAP mmol/L 7   < > 10   CALCIUM mg/dL 8.8   < > 8.9   ALBUMIN g/dL  --   --  3.7   TOTAL BILIRUBIN mg/dL  --   --  0.63   ALK PHOS U/L  --   --  66   ALT U/L  --   --  15   AST U/L  --   --  21   GLUCOSE RANDOM mg/dL 102   < > 96    < > = values in this interval not displayed.                       Lines/Drains:  Invasive Devices       Peripheral Intravenous Line  Duration             Peripheral IV 02/13/24 Distal;Right;Upper;Ventral (anterior) Arm 2 days                          Imaging: No pertinent imaging reviewed.    Recent Cultures (last 7 days):         Last 24 Hours Medication List:   Current Facility-Administered Medications   Medication Dose Route Frequency Provider Last Rate    Artificial Tears  1 drop Both Eyes Q4H PRN Spenser Lopez MD      aspirin  81 mg Oral Daily Margarita Torres PA-C      atorvastatin  40 mg Oral Daily With Breakfast Margarita Torres PA-C      baclofen  5 mg Oral BID PRN Margarita Torres PA-C      donepezil  10 mg Oral Daily Margarita Torres PA-C      enoxaparin  40 mg Subcutaneous Daily Margarita Torres PA-C      levETIRAcetam  500 mg Oral Q12H JOSE Margarita Torres PA-C      losartan  50 mg Oral Daily Margarita Torres PA-C      melatonin  5 mg Oral HS Margarita Torres PA-C      metoprolol succinate  100 mg Oral Daily Margarita Torres PA-C      pantoprazole  20 mg Oral Early Morning Margarita Torres PA-C      polyethylene glycol  17 g Oral Daily Pilar Glaser MD      senna-docusate sodium  1 tablet Oral BID  Pilar Glaser MD      tamsulosin  0.4 mg Oral Daily With Dinner Margarita Torres PA-C      ticagrelor  90 mg Oral Q12H JOSE Margarita Torres PA-C      traZODone  100 mg Oral HS Margarita Torres PA-C          Today, Patient Was Seen By: Pilar Glaser MD    **Please Note: This note may have been constructed using a voice recognition system.**

## 2024-02-15 NOTE — PLAN OF CARE
Problem: PAIN - ADULT  Goal: Verbalizes/displays adequate comfort level or baseline comfort level  Description: Interventions:  - Encourage patient to monitor pain and request assistance  - Assess pain using appropriate pain scale  - Administer analgesics based on type and severity of pain and evaluate response  - Implement non-pharmacological measures as appropriate and evaluate response  - Consider cultural and social influences on pain and pain management  - Notify physician/advanced practitioner if interventions unsuccessful or patient reports new pain  Outcome: Progressing     Problem: INFECTION - ADULT  Goal: Absence or prevention of progression during hospitalization  Description: INTERVENTIONS:  - Assess and monitor for signs and symptoms of infection  - Monitor lab/diagnostic results  - Monitor all insertion sites, i.e. indwelling lines, tubes, and drains  - Monitor endotracheal if appropriate and nasal secretions for changes in amount and color  - [place] appropriate cooling/warming therapies per order  - Administer medications as ordered  - Instruct and encourage patient and family to use good hand hygiene technique  - Identify and instruct in appropriate isolation precautions for identified infection/condition  Outcome: Progressing

## 2024-02-15 NOTE — ASSESSMENT & PLAN NOTE
Patient with a history of generalized weakness, prior admission discharged to [facility].  Patient arrives from home.  Typically walks with a walker  PT/OT- recommended rehab   CM placed referrals for rehab  Waiting for placement

## 2024-02-16 PROBLEM — R41.82 AMS (ALTERED MENTAL STATUS): Status: RESOLVED | Noted: 2024-02-08 | Resolved: 2024-02-16

## 2024-02-16 LAB
ANION GAP SERPL CALCULATED.3IONS-SCNC: 5 MMOL/L
BUN SERPL-MCNC: 22 MG/DL (ref 5–25)
CALCIUM SERPL-MCNC: 8.8 MG/DL (ref 8.4–10.2)
CHLORIDE SERPL-SCNC: 109 MMOL/L (ref 96–108)
CO2 SERPL-SCNC: 26 MMOL/L (ref 21–32)
CREAT SERPL-MCNC: 0.85 MG/DL (ref 0.6–1.3)
ERYTHROCYTE [DISTWIDTH] IN BLOOD BY AUTOMATED COUNT: 13.4 % (ref 11.6–15.1)
GFR SERPL CREATININE-BSD FRML MDRD: 91 ML/MIN/1.73SQ M
GLUCOSE SERPL-MCNC: 93 MG/DL (ref 65–140)
HCT VFR BLD AUTO: 35.7 % (ref 36.5–49.3)
HGB BLD-MCNC: 11.9 G/DL (ref 12–17)
MCH RBC QN AUTO: 31.6 PG (ref 26.8–34.3)
MCHC RBC AUTO-ENTMCNC: 33.3 G/DL (ref 31.4–37.4)
MCV RBC AUTO: 95 FL (ref 82–98)
PLATELET # BLD AUTO: 321 THOUSANDS/UL (ref 149–390)
PMV BLD AUTO: 9.5 FL (ref 8.9–12.7)
POTASSIUM SERPL-SCNC: 3.4 MMOL/L (ref 3.5–5.3)
RBC # BLD AUTO: 3.77 MILLION/UL (ref 3.88–5.62)
SODIUM SERPL-SCNC: 140 MMOL/L (ref 135–147)
WBC # BLD AUTO: 8.98 THOUSAND/UL (ref 4.31–10.16)

## 2024-02-16 PROCEDURE — 85027 COMPLETE CBC AUTOMATED: CPT | Performed by: INTERNAL MEDICINE

## 2024-02-16 PROCEDURE — 80048 BASIC METABOLIC PNL TOTAL CA: CPT | Performed by: INTERNAL MEDICINE

## 2024-02-16 PROCEDURE — 99232 SBSQ HOSP IP/OBS MODERATE 35: CPT | Performed by: INTERNAL MEDICINE

## 2024-02-16 RX ORDER — POTASSIUM CHLORIDE 20 MEQ/1
20 TABLET, EXTENDED RELEASE ORAL ONCE
Qty: 1 TABLET | Refills: 0 | Status: COMPLETED | OUTPATIENT
Start: 2024-02-16 | End: 2024-02-16

## 2024-02-16 RX ADMIN — METOPROLOL SUCCINATE 100 MG: 50 TABLET, EXTENDED RELEASE ORAL at 09:05

## 2024-02-16 RX ADMIN — ASPIRIN 81 MG: 81 TABLET, COATED ORAL at 09:05

## 2024-02-16 RX ADMIN — DONEPEZIL HYDROCHLORIDE 10 MG: 5 TABLET ORAL at 09:04

## 2024-02-16 RX ADMIN — ENOXAPARIN SODIUM 40 MG: 100 INJECTION SUBCUTANEOUS at 09:08

## 2024-02-16 RX ADMIN — POTASSIUM CHLORIDE 20 MEQ: 1500 TABLET, EXTENDED RELEASE ORAL at 09:08

## 2024-02-16 RX ADMIN — POLYETHYLENE GLYCOL 3350 17 G: 17 POWDER, FOR SOLUTION ORAL at 09:08

## 2024-02-16 RX ADMIN — TICAGRELOR 90 MG: 90 TABLET ORAL at 09:05

## 2024-02-16 RX ADMIN — SENNOSIDES AND DOCUSATE SODIUM 1 TABLET: 8.6; 5 TABLET ORAL at 09:06

## 2024-02-16 RX ADMIN — Medication 5 MG: at 21:34

## 2024-02-16 RX ADMIN — TICAGRELOR 90 MG: 90 TABLET ORAL at 21:34

## 2024-02-16 RX ADMIN — PANTOPRAZOLE SODIUM 20 MG: 20 TABLET, DELAYED RELEASE ORAL at 05:26

## 2024-02-16 RX ADMIN — SENNOSIDES AND DOCUSATE SODIUM 1 TABLET: 8.6; 5 TABLET ORAL at 18:10

## 2024-02-16 RX ADMIN — LOSARTAN POTASSIUM 50 MG: 50 TABLET, FILM COATED ORAL at 09:05

## 2024-02-16 RX ADMIN — TRAZODONE HYDROCHLORIDE 100 MG: 50 TABLET ORAL at 21:34

## 2024-02-16 RX ADMIN — TAMSULOSIN HYDROCHLORIDE 0.4 MG: 0.4 CAPSULE ORAL at 18:10

## 2024-02-16 RX ADMIN — LEVETIRACETAM 500 MG: 500 TABLET, FILM COATED ORAL at 21:34

## 2024-02-16 RX ADMIN — ATORVASTATIN CALCIUM 40 MG: 40 TABLET, FILM COATED ORAL at 09:04

## 2024-02-16 RX ADMIN — LEVETIRACETAM 500 MG: 500 TABLET, FILM COATED ORAL at 09:04

## 2024-02-16 NOTE — ASSESSMENT & PLAN NOTE
Patient with a history of generalized weakness, prior admission discharged to [facility].  Patient arrives from home.  Typically walks with a walker  PT/OT- recommended rehab   CM placed referrals for rehab  Waiting for placement

## 2024-02-16 NOTE — PLAN OF CARE
Problem: PAIN - ADULT  Goal: Verbalizes/displays adequate comfort level or baseline comfort level  Description: Interventions:  - Encourage patient to monitor pain and request assistance  - Assess pain using appropriate pain scale  - Administer analgesics based on type and severity of pain and evaluate response  - Implement non-pharmacological measures as appropriate and evaluate response  - Consider cultural and social influences on pain and pain management  - Notify physician/advanced practitioner if interventions unsuccessful or patient reports new pain  Outcome: Progressing     Problem: INFECTION - ADULT  Goal: Absence or prevention of progression during hospitalization  Description: INTERVENTIONS:  - Assess and monitor for signs and symptoms of infection  - Monitor lab/diagnostic results  - Monitor all insertion sites, i.e. indwelling lines, tubes, and drains  - Monitor endotracheal if appropriate and nasal secretions for changes in amount and color  - [place] appropriate cooling/warming therapies per order  - Administer medications as ordered  - Instruct and encourage patient and family to use good hand hygiene technique  - Identify and instruct in appropriate isolation precautions for identified infection/condition  Outcome: Progressing     Problem: SAFETY ADULT  Goal: Patient will remain free of falls  Description: INTERVENTIONS:  - Educate patient/family on patient safety including physical limitations  - Instruct patient to call for assistance with activity   - Consult OT/PT to assist with strengthening/mobility   - Keep Call bell within reach  - Keep bed low and locked with side rails adjusted as appropriate  - Keep care items and personal belongings within reach  - Initiate and maintain comfort rounds  - Make Fall Risk Sign visible to staff  - Offer Toileting every 2 Hours, in advance of need  - Initiate/Maintain bed alarm  - Obtain necessary fall risk management equipment: socks   - Apply yellow  socks and bracelet for high fall risk patients  - Consider moving patient to room near nurses station  Outcome: Progressing  Goal: Maintain or return to baseline ADL function  Description: INTERVENTIONS:  -  Assess patient's ability to carry out ADLs; assess patient's baseline for ADL function and identify physical deficits which impact ability to perform ADLs (bathing, care of mouth/teeth, toileting, grooming, dressing, etc.)  - Assess/evaluate cause of self-care deficits   - Assess range of motion  - Assess patient's mobility; develop plan if impaired  - Assess patient's need for assistive devices and provide as appropriate  - Encourage maximum independence but intervene and supervise when necessary  - Involve family in performance of ADLs  - Assess for home care needs following discharge   - Consider OT consult to assist with ADL evaluation and planning for discharge  - Provide patient education as appropriate  Outcome: Progressing  Goal: Maintains/Returns to pre admission functional level  Description: INTERVENTIONS:  - Perform AM-PAC 6 Click Basic Mobility/ Daily Activity assessment daily.  - Set and communicate daily mobility goal to care team and patient/family/caregiver.   - Collaborate with rehabilitation services on mobility goals if consulted  - Perform Range of Motion 3 times a day.  - Reposition patient every 2 hours.  - Dangle patient 3 times a day  - Stand patient 3 times a day  - Ambulate patient 3 times a day  - Out of bed to chair 3 times a day   - Out of bed for meals 3 times a day  - Out of bed for toileting  - Record patient progress and toleration of activity level   Outcome: Progressing     Problem: DISCHARGE PLANNING  Goal: Discharge to home or other facility with appropriate resources  Description: INTERVENTIONS:  - Identify barriers to discharge w/patient and caregiver  - Arrange for needed discharge resources and transportation as appropriate  - Identify discharge learning needs (meds,  wound care, etc.)  - Arrange for interpretive services to assist at discharge as needed  - Refer to Case Management Department for coordinating discharge planning if the patient needs post-hospital services based on physician/advanced practitioner order or complex needs related to functional status, cognitive ability, or social support system  Outcome: Progressing     Problem: Knowledge Deficit  Goal: Patient/family/caregiver demonstrates understanding of disease process, treatment plan, medications, and discharge instructions  Description: Complete learning assessment and assess knowledge base.  Interventions:  - Provide teaching at level of understanding  - Provide teaching via preferred learning methods  Outcome: Progressing     Problem: Nutrition/Hydration-ADULT  Goal: Nutrient/Hydration intake appropriate for improving, restoring or maintaining nutritional needs  Description: Monitor and assess patient's nutrition/hydration status for malnutrition. Collaborate with interdisciplinary team and initiate plan and interventions as ordered.  Monitor patient's weight and dietary intake as ordered or per policy. Utilize nutrition screening tool and intervene as necessary. Determine patient's food preferences and provide high-protein, high-caloric foods as appropriate.     INTERVENTIONS:  - Monitor oral intake, urinary output, labs, and treatment plans  - Assess nutrition and hydration status and recommend course of action  - Evaluate amount of meals eaten  - Assist patient with eating if necessary   - Allow adequate time for meals  - Recommend/ encourage appropriate diets, oral nutritional supplements, and vitamin/mineral supplements  - Order, calculate, and assess calorie counts as needed  - Recommend, monitor, and adjust tube feedings and TPN/PPN based on assessed needs  - Assess need for intravenous fluids  - Provide specific nutrition/hydration education as appropriate  - Include patient/family/caregiver in  decisions related to nutrition  Outcome: Progressing     Problem: MOBILITY - ADULT  Goal: Maintain or return to baseline ADL function  Description: INTERVENTIONS:  -  Assess patient's ability to carry out ADLs; assess patient's baseline for ADL function and identify physical deficits which impact ability to perform ADLs (bathing, care of mouth/teeth, toileting, grooming, dressing, etc.)  - Assess/evaluate cause of self-care deficits   - Assess range of motion  - Assess patient's mobility; develop plan if impaired  - Assess patient's need for assistive devices and provide as appropriate  - Encourage maximum independence but intervene and supervise when necessary  - Involve family in performance of ADLs  - Assess for home care needs following discharge   - Consider OT consult to assist with ADL evaluation and planning for discharge  - Provide patient education as appropriate  Outcome: Progressing  Goal: Maintains/Returns to pre admission functional level  Description: INTERVENTIONS:  - Perform AM-PAC 6 Click Basic Mobility/ Daily Activity assessment daily.  - Set and communicate daily mobility goal to care team and patient/family/caregiver.   - Collaborate with rehabilitation services on mobility goals if consulted  - Perform Range of Motion 3 times a day.  - Reposition patient every 2 hours.  - Dangle patient 3 times a day  - Stand patient 3 times a day  - Ambulate patient 3 times a day  - Out of bed to chair 3 times a day   - Out of bed for meals 3 times a day  - Out of bed for toileting  - Record patient progress and toleration of activity level   Outcome: Progressing     Problem: NEUROSENSORY - ADULT  Goal: Achieves stable or improved neurological status  Description: INTERVENTIONS  - Monitor and report changes in neurological status  - Monitor vital signs such as temperature, blood pressure, glucose, and any other labs ordered   - Initiate measures to prevent increased intracranial pressure  - Monitor for seizure  activity and implement precautions if appropriate      Outcome: Progressing  Goal: Remains free of injury related to seizures activity  Description: INTERVENTIONS  - Maintain airway, patient safety  and administer oxygen as ordered  - Monitor patient for seizure activity, document and report duration and description of seizure to physician/advanced practitioner  - If seizure occurs,  ensure patient safety during seizure  - Reorient patient post seizure  - Seizure pads on all 4 side rails  - Instruct patient/family to notify RN of any seizure activity including if an aura is experienced  - Instruct patient/family to call for assistance with activity based on nursing assessment  - Administer anti-seizure medications if ordered    Outcome: Progressing  Goal: Achieves maximal functionality and self care  Description: INTERVENTIONS  - Monitor swallowing and airway patency with patient fatigue and changes in neurological status  - Encourage and assist patient to increase activity and self care.   - Encourage visually impaired, hearing impaired and aphasic patients to use assistive/communication devices  Outcome: Progressing     Problem: CARDIOVASCULAR - ADULT  Goal: Maintains optimal cardiac output and hemodynamic stability  Description: INTERVENTIONS:  - Monitor I/O, vital signs and rhythm  - Monitor for S/S and trends of decreased cardiac output  - Administer and titrate ordered vasoactive medications to optimize hemodynamic stability  - Assess quality of pulses, skin color and temperature  - Assess for signs of decreased coronary artery perfusion  - Instruct patient to report change in severity of symptoms  Outcome: Progressing  Goal: Absence of cardiac dysrhythmias or at baseline rhythm  Description: INTERVENTIONS:  - Continuous cardiac monitoring, vital signs, obtain 12 lead EKG if ordered  - Administer antiarrhythmic and heart rate control medications as ordered  - Monitor electrolytes and administer replacement  therapy as ordered  Outcome: Progressing     Problem: RESPIRATORY - ADULT  Goal: Achieves optimal ventilation and oxygenation  Description: INTERVENTIONS:  - Assess for changes in respiratory status  - Assess for changes in mentation and behavior  - Position to facilitate oxygenation and minimize respiratory effort  - Oxygen administered by appropriate delivery if ordered  - Initiate smoking cessation education as indicated  - Encourage broncho-pulmonary hygiene including cough, deep breathe, Incentive Spirometry  - Assess the need for suctioning and aspirate as needed  - Assess and instruct to report SOB or any respiratory difficulty  - Respiratory Therapy support as indicated  Outcome: Progressing     Problem: METABOLIC, FLUID AND ELECTROLYTES - ADULT  Goal: Electrolytes maintained within normal limits  Description: INTERVENTIONS:  - Monitor labs and assess patient for signs and symptoms of electrolyte imbalances  - Administer electrolyte replacement as ordered  - Monitor response to electrolyte replacements, including repeat lab results as appropriate  - Instruct patient on fluid and nutrition as appropriate  Outcome: Progressing  Goal: Fluid balance maintained  Description: INTERVENTIONS:  - Monitor labs   - Monitor I/O and WT  - Instruct patient on fluid and nutrition as appropriate  - Assess for signs & symptoms of volume excess or deficit  Outcome: Progressing  Goal: Glucose maintained within target range  Description: INTERVENTIONS:  - Monitor Blood Glucose as ordered  - Assess for signs and symptoms of hyperglycemia and hypoglycemia  - Administer ordered medications to maintain glucose within target range  - Assess nutritional intake and initiate nutrition service referral as needed  Outcome: Progressing     Problem: SKIN/TISSUE INTEGRITY - ADULT  Goal: Skin Integrity remains intact(Skin Breakdown Prevention)  Description: Assess:  -Perform [place] assessment every shift  -Clean and moisturize skin every  day  -Inspect skin when repositioning, toileting, and assisting with ADLS  -Assess extremities for adequate circulation and sensation     Bed Management:  -Have minimal linens on bed & keep smooth, unwrinkled  -Change linens as needed when moist or perspiring  -Avoid sitting or lying in one position for more than 2 hours while in bed  -Keep HOB at 30 degrees     Toileting:  -Offer bedside commode  -Assess for incontinence every shift  -Use incontinent care products after each incontinent episode such as faom    Activity:  -Mobilize patient 3 times a day  -Encourage activity and walks on unit  -Encourage or provide ROM exercises   -Turn and reposition patient every 8 Hours  -Use appropriate equipment to lift or move patient in bed  -Instruct/ Assist with weight shifting every 2 when out of bed in chair  -Consider limitation of chair time 120 hour intervals    Skin Care:  -Avoid use of baby powder, tape, friction and shearing, hot water or constrictive clothing  -Relieve pressure over bony prominences using wedges  -Do not massage red bony areas    Next Steps:  -Teach patient strategies to minimize risks such as fall   -Consider consults to  interdisciplinary teams such as PT/OT  Outcome: Progressing  Goal: Incision(s), wounds(s) or drain site(s) healing without S/S of infection  Description: INTERVENTIONS  - Assess and document dressing, incision, wound bed, drain sites and surrounding tissue  - Provide patient and family education  - Perform skin care/dressing changes as ordered  Outcome: Progressing  Goal: Pressure injury heals and does not worsen  Description: Interventions:  - Implement low air loss mattress or specialty surface (Criteria met)  - Apply silicone foam dressing  - Instruct/assist with weight shifting every 30 minutes when in chair   - Limit chair time to 2 hour intervals  - Use special pressure reducing interventions such as weight shifting when in chair   - Apply fecal or urinary incontinence  containment device   - Perform passive or active ROM every shift  - Turn and reposition patient & offload bony prominences every 2 hours   - Utilize friction reducing device or surface for transfers   - Consider consults to  interdisciplinary teams such as   - Use incontinent care products after each incontinent episode such as chucks  - Consider nutrition services referral as needed  Outcome: Progressing     Problem: MUSCULOSKELETAL - ADULT  Goal: Maintain or return mobility to safest level of function  Description: INTERVENTIONS:  - Assess patient's ability to carry out ADLs; assess patient's baseline for ADL function and identify physical deficits which impact ability to perform ADLs (bathing, care of mouth/teeth, toileting, grooming, dressing, etc.)  - Assess/evaluate cause of self-care deficits   - Assess range of motion  - Assess patient's mobility  - Assess patient's need for assistive devices and provide as appropriate  - Encourage maximum independence but intervene and supervise when necessary  - Involve family in performance of ADLs  - Assess for home care needs following discharge   - Consider OT consult to assist with ADL evaluation and planning for discharge  - Provide patient education as appropriate  Outcome: Progressing  Goal: Maintain proper alignment of affected body part  Description: INTERVENTIONS:  - Support, maintain and protect limb and body alignment  - Provide patient/ family with appropriate education  Outcome: Progressing     Problem: Prexisting or High Potential for Compromised Skin Integrity  Goal: Skin integrity is maintained or improved  Description: INTERVENTIONS:  - Identify patients at risk for skin breakdown  - Assess and monitor skin integrity  - Assess and monitor nutrition and hydration status  - Monitor labs   - Assess for incontinence   - Turn and reposition patient  - Assist with mobility/ambulation  - Relieve pressure over bony prominences  - Avoid friction and shearing  -  Provide appropriate hygiene as needed including keeping skin clean and dry  - Evaluate need for skin moisturizer/barrier cream  - Collaborate with interdisciplinary team   - Patient/family teaching  - Consider wound care consult   Outcome: Progressing

## 2024-02-16 NOTE — CASE MANAGEMENT
Case Management Progress Note    Patient name Constanza Zhu  Location /-01 MRN [id number]  : 1960 Date 2024       LOS (days): 7  Geometric Mean LOS (GMLOS) (days): 5  Days to GMLOS:-2.1        OBJECTIVE:        Current admission status: Inpatient  Preferred Pharmacy:   [id number] - [place], [place] - [address]  [address] [facility] [address]  Phone: [phone] Fax: [phone]    [facility] - [place], [place] - [address]  [address] [address]  Phone: [phone] Fax: [phone]    Primary Care Provider: ERIC Calixto    Primary Insurance: BLUE CROSS  Secondary Insurance: MEDICARE    PROGRESS NOTE:    Received a call from Belkis [facility] pedro luis who states she received a call from Gianna at pt's insurance with a denial for acute rehab.     Belkis informed CM that she asked Gianna if an approval could be received for SNF. Gianna states a new auth would need to be submitted.     Belkis provided CM with contact information for pt's insurance. Phone# [phone] and fax# [phone].  Gianna's contact at insurance for acute case was: phone# [phone] opt [phone] ext [phone].    QUAN spoke to Gerri at [facility] who states a bed is still available.    QUAN called and spoke to pt's wife Faiza to update her on insurance determination and bed availability at [facility].     Faiza states she is agreeable to rehab at [facility].     QUAN sent task to  dc support to request SNF auth for [facility].

## 2024-02-16 NOTE — PLAN OF CARE
Problem: PAIN - ADULT  Goal: Verbalizes/displays adequate comfort level or baseline comfort level  Description: Interventions:  - Encourage patient to monitor pain and request assistance  - Assess pain using appropriate pain scale  - Administer analgesics based on type and severity of pain and evaluate response  - Implement non-pharmacological measures as appropriate and evaluate response  - Consider cultural and social influences on pain and pain management  - Notify physician/advanced practitioner if interventions unsuccessful or patient reports new pain  Outcome: Progressing     Problem: INFECTION - ADULT  Goal: Absence or prevention of progression during hospitalization  Description: INTERVENTIONS:  - Assess and monitor for signs and symptoms of infection  - Monitor lab/diagnostic results  - Monitor all insertion sites, i.e. indwelling lines, tubes, and drains  - Monitor endotracheal if appropriate and nasal secretions for changes in amount and color  - [place] appropriate cooling/warming therapies per order  - Administer medications as ordered  - Instruct and encourage patient and family to use good hand hygiene technique  - Identify and instruct in appropriate isolation precautions for identified infection/condition  Outcome: Progressing     Problem: SAFETY ADULT  Goal: Patient will remain free of falls  Description: INTERVENTIONS:  - Educate patient/family on patient safety including physical limitations  - Instruct patient to call for assistance with activity   - Consult OT/PT to assist with strengthening/mobility   - Keep Call bell within reach  - Keep bed low and locked with side rails adjusted as appropriate  - Keep care items and personal belongings within reach  - Initiate and maintain comfort rounds  - Make Fall Risk Sign visible to staff  - Offer Toileting every 2 Hours, in advance of need  - Initiate/Maintain bed alarm  - Obtain necessary fall risk management equipment: socks   - Apply yellow  socks and bracelet for high fall risk patients  - Consider moving patient to room near nurses station  Outcome: Progressing  Goal: Maintain or return to baseline ADL function  Description: INTERVENTIONS:  -  Assess patient's ability to carry out ADLs; assess patient's baseline for ADL function and identify physical deficits which impact ability to perform ADLs (bathing, care of mouth/teeth, toileting, grooming, dressing, etc.)  - Assess/evaluate cause of self-care deficits   - Assess range of motion  - Assess patient's mobility; develop plan if impaired  - Assess patient's need for assistive devices and provide as appropriate  - Encourage maximum independence but intervene and supervise when necessary  - Involve family in performance of ADLs  - Assess for home care needs following discharge   - Consider OT consult to assist with ADL evaluation and planning for discharge  - Provide patient education as appropriate  Outcome: Progressing  Goal: Maintains/Returns to pre admission functional level  Description: INTERVENTIONS:  - Perform AM-PAC 6 Click Basic Mobility/ Daily Activity assessment daily.  - Set and communicate daily mobility goal to care team and patient/family/caregiver.   - Collaborate with rehabilitation services on mobility goals if consulted  - Perform Range of Motion 3 times a day.  - Reposition patient every 2 hours.  - Dangle patient 3 times a day  - Stand patient 3 times a day  - Ambulate patient 3 times a day  - Out of bed to chair 3 times a day   - Out of bed for meals 3 times a day  - Out of bed for toileting  - Record patient progress and toleration of activity level   Outcome: Progressing     Problem: DISCHARGE PLANNING  Goal: Discharge to home or other facility with appropriate resources  Description: INTERVENTIONS:  - Identify barriers to discharge w/patient and caregiver  - Arrange for needed discharge resources and transportation as appropriate  - Identify discharge learning needs (meds,  wound care, etc.)  - Arrange for interpretive services to assist at discharge as needed  - Refer to Case Management Department for coordinating discharge planning if the patient needs post-hospital services based on physician/advanced practitioner order or complex needs related to functional status, cognitive ability, or social support system  Outcome: Progressing

## 2024-02-16 NOTE — PROGRESS NOTES
[facility]  Progress Note  Name: Constanza Zhu I  MRN: [id number]  Unit/Bed#: -01 I Date of Admission: 2/7/2024   Date of Service: 2/16/2024 I Hospital Day: 7    Assessment/Plan   Generalized weakness  Assessment & Plan  Patient with a history of generalized weakness, prior admission discharged to [facility].  Patient arrives from home.  Typically walks with a walker  PT/OT- recommended rehab   CM placed referrals for rehab  Waiting for placement    Focal epilepsy (HCC)  Assessment & Plan  Patient with history of staring episodes, felt to be absence seizure's  Follows with neurology as outpatient  No acute events  continue Keppra 500 Mg    History of prediabetes  Assessment & Plan  Continue carb controlled diet and monitor sugars with BMP  Sugars remained stable    GERD (gastroesophageal reflux disease)  Assessment & Plan  Continue Protonix 20 mg    Primary hypertension  Assessment & Plan  Continue losartan and metoprolol  Patient hypertensive on admission, as needed labetalol IV for SBP greater than 180  Blood pressure control remains stable    History of stroke  Assessment & Plan  Patient with history of bilateral MCA strokes, with right stenosis and left stenosis with left ICA stent placement in May 2023  Baseline GCS 12, per EMS  GCS at time of admission 13, eye-opening to sound, confused, obeying commands  Follows with neurology outpatient  Per last neurology note BP goal of <130/80  Continue aspirin, Plavix, statin    * COVID  Assessment & Plan  Patient admitted in October 2023 for sepsis due to COVID, given IV remdesivir  Patient noted to have a fever of 101 at home today and was given 2 Tylenol,  Recurrent fever last evening of 100.6  Patient remains on room air  Completed remdesivir    AMS (altered mental status)-resolved as of 2/16/2024  Assessment & Plan  Patient with history of NPH, hypertension induced encephalopathy, recent diagnosis of COVID was found to have  increased lethargy and confusion around 3 PM this afternoon when his wife came to check on him.  Baseline GCS is typically greater than 12 given prior history of multiple strokes.  GCS at time of admission 13, patient is only oriented to self  CT head and neck:  No changes from prior imaging studies, no intracranial hemorrhage or mass, stable old infarcts  Altered mental status likely in the setting of COVID infection, mental status returned to baseline  Continue to monitor neuro status  Currently at baseline     Hypokalemia-resolved as of 2/12/2024  Assessment & Plan  Patient with potassium of 3.2 on admission  Received 40 mill equivalents potassium in the ED  Hold home HTZ 12.5mg  Hypokalemia resolved          VTE Pharmacologic Prophylaxis: VTE Score: 8 High Risk (Score >/= 5) - Pharmacological DVT Prophylaxis Ordered: enoxaparin (Lovenox). Sequential Compression Devices Ordered.    Mobility:   Basic Mobility Inpatient Raw Score: 18  JH-HLM Goal: 6: Walk 10 steps or more  JH-HLM Achieved: 7: Walk 25 feet or more  HLM Goal NOT achieved. Continue with multidisciplinary rounding and encourage appropriate mobility to improve upon HLM goals.    Patient Centered Rounds: I performed bedside rounds with nursing staff today.   Discussions with Specialists or Other Care Team Provider: cm    Education and Discussions with Family / Patient:  Did not call.  Discussed with wife yesterday and we were in agreement not to call unless something changes in patient's or if the patient will be discharged today.     Total Time Spent on Date of Encounter in care of patient: 30 mins. This time was spent on one or more of the following: performing physical exam; counseling and coordination of care; obtaining or reviewing history; documenting in the medical record; reviewing/ordering tests, medications or procedures; communicating with other healthcare professionals and discussing with patient's family/caregivers.    Current Length of  Stay: 7 day(s)  Current Patient Status: Inpatient   Certification Statement:  Waiting for rehab placement  Discharge Plan: Anticipate discharge in 24-48 hrs to rehab facility.    Code Status: Level 1 - Full Code    Subjective:     Patient has no complaints however upset that he still in the hospital.  He wants to be discharged at rehab and get his strength back.  Also he was upset that he was not woken up 2:00 last night and he was not able to sleep since 2 until late during the morning.    Objective:     Vitals:   Temp (24hrs), Av.4 °F (36.9 °C), Min:97.8 °F (36.6 °C), Max:99.3 °F (37.4 °C)    Temp:  [97.8 °F (36.6 °C)-99.3 °F (37.4 °C)] 97.8 °F (36.6 °C)  HR:  [67-80] 80  Resp:  [12-16] 16  BP: (125-141)/(68-77) 140/73  SpO2:  [94 %-99 %] 99 %  Body mass index is 27.55 kg/m².     Input and Output Summary (last 24 hours):     Intake/Output Summary (Last 24 hours) at 2024 1854  Last data filed at 2024 1730  Gross per 24 hour   Intake 240 ml   Output 500 ml   Net -260 ml       Physical Exam:   Physical Exam  Vitals and nursing note reviewed.   Constitutional:       General: He is not in acute distress.     Appearance: He is not diaphoretic.   HENT:      Head: Normocephalic.   Eyes:      General: No scleral icterus.        Right eye: No discharge.         Left eye: No discharge.   Cardiovascular:      Rate and Rhythm: Normal rate and regular rhythm.   Pulmonary:      Effort: Pulmonary effort is normal. No respiratory distress.      Breath sounds: Normal breath sounds. No wheezing, rhonchi or rales.   Abdominal:      General: There is no distension.      Palpations: Abdomen is soft.      Tenderness: There is no abdominal tenderness. There is no guarding or rebound.   Musculoskeletal:      Cervical back: Normal range of motion.      Right lower leg: No edema.      Left lower leg: No edema.   Skin:     General: Skin is warm.   Neurological:      Mental Status: He is alert.      Comments: Oriented to self  and place   Psychiatric:         Mood and Affect: Mood normal.         Behavior: Behavior normal.          Additional Data:     Labs:  Results from last 7 days   Lab Units 02/16/24 0414 02/13/24 0456 02/10/24  0358   WBC Thousand/uL 8.98   < > 10.76*   HEMOGLOBIN g/dL 11.9*   < > 13.7   HEMATOCRIT % 35.7*   < > 41.3   PLATELETS Thousands/uL 321   < > 185   NEUTROS PCT %  --   --  67   LYMPHS PCT %  --   --  16   MONOS PCT %  --   --  13*   EOS PCT %  --   --  2    < > = values in this interval not displayed.     Results from last 7 days   Lab Units 02/16/24 0414 02/11/24  0426 02/10/24  0358   SODIUM mmol/L 140   < > 134*   POTASSIUM mmol/L 3.4*   < > 3.7   CHLORIDE mmol/L 109*   < > 103   CO2 mmol/L 26   < > 21   BUN mg/dL 22   < > 26*   CREATININE mg/dL 0.85   < > 0.93   ANION GAP mmol/L 5   < > 10   CALCIUM mg/dL 8.8   < > 8.9   ALBUMIN g/dL  --   --  3.7   TOTAL BILIRUBIN mg/dL  --   --  0.63   ALK PHOS U/L  --   --  66   ALT U/L  --   --  15   AST U/L  --   --  21   GLUCOSE RANDOM mg/dL 93   < > 96    < > = values in this interval not displayed.                       Lines/Drains:  Invasive Devices       Peripheral Intravenous Line  Duration             Peripheral IV 02/13/24 Distal;Right;Upper;Ventral (anterior) Arm 3 days                          Imaging: No pertinent imaging reviewed.    Recent Cultures (last 7 days):         Last 24 Hours Medication List:   Current Facility-Administered Medications   Medication Dose Route Frequency Provider Last Rate    Artificial Tears  1 drop Both Eyes Q4H PRN Spenser Lopez MD      aspirin  81 mg Oral Daily Margarita Torres PA-C      atorvastatin  40 mg Oral Daily With Breakfast Margarita Torres PA-C      baclofen  5 mg Oral BID PRN Margarita Torres PA-C      donepezil  10 mg Oral Daily Margarita Torres PA-C      enoxaparin  40 mg Subcutaneous Daily Margarita Torres PA-C      levETIRAcetam  500 mg Oral Q12H [facility] Margarita Torres PA-C      losartan  50 mg  Oral Daily Margarita Torres PA-C      melatonin  5 mg Oral HS Margarita Torres PA-C      metoprolol succinate  100 mg Oral Daily Margarita Torres PA-C      pantoprazole  20 mg Oral Early Morning Margarita Torres PA-C      polyethylene glycol  17 g Oral Daily Pilar Glaser MD      senna-docusate sodium  1 tablet Oral BID Pilar Glaser MD      tamsulosin  0.4 mg Oral Daily With Dinner Margarita Torres PA-C      ticagrelor  90 mg Oral Q12H JOSE Margarita Torres PA-C      traZODone  100 mg Oral HS Margarita Torres PA-C          Today, Patient Was Seen By: Pilar Galser MD    **Please Note: This note may have been constructed using a voice recognition system.**

## 2024-02-16 NOTE — CASE MANAGEMENT
[facility] received request for authorization from Care Manager.  Authorization request for: SNF  [facility] Name: [facility]  NPI:[id number]  Facility MD:  Mariely Sanchez    NPI: [id number]  Authorization initiated by contacting insurance:  [place] [employer] [place]   Via: Fax  Clinicals submitted via: fax # [phone] , [phone]

## 2024-02-17 LAB
ANION GAP SERPL CALCULATED.3IONS-SCNC: 6 MMOL/L
BUN SERPL-MCNC: 20 MG/DL (ref 5–25)
CALCIUM SERPL-MCNC: 8.9 MG/DL (ref 8.4–10.2)
CHLORIDE SERPL-SCNC: 108 MMOL/L (ref 96–108)
CO2 SERPL-SCNC: 26 MMOL/L (ref 21–32)
CREAT SERPL-MCNC: 0.79 MG/DL (ref 0.6–1.3)
ERYTHROCYTE [DISTWIDTH] IN BLOOD BY AUTOMATED COUNT: 13.4 % (ref 11.6–15.1)
GFR SERPL CREATININE-BSD FRML MDRD: 94 ML/MIN/1.73SQ M
GLUCOSE SERPL-MCNC: 87 MG/DL (ref 65–140)
HCT VFR BLD AUTO: 35.4 % (ref 36.5–49.3)
HGB BLD-MCNC: 11.6 G/DL (ref 12–17)
MCH RBC QN AUTO: 31.2 PG (ref 26.8–34.3)
MCHC RBC AUTO-ENTMCNC: 32.8 G/DL (ref 31.4–37.4)
MCV RBC AUTO: 95 FL (ref 82–98)
PLATELET # BLD AUTO: 319 THOUSANDS/UL (ref 149–390)
PMV BLD AUTO: 9.7 FL (ref 8.9–12.7)
POTASSIUM SERPL-SCNC: 3.8 MMOL/L (ref 3.5–5.3)
RBC # BLD AUTO: 3.72 MILLION/UL (ref 3.88–5.62)
SODIUM SERPL-SCNC: 140 MMOL/L (ref 135–147)
WBC # BLD AUTO: 10.29 THOUSAND/UL (ref 4.31–10.16)

## 2024-02-17 PROCEDURE — 99232 SBSQ HOSP IP/OBS MODERATE 35: CPT | Performed by: INTERNAL MEDICINE

## 2024-02-17 PROCEDURE — 85027 COMPLETE CBC AUTOMATED: CPT | Performed by: INTERNAL MEDICINE

## 2024-02-17 PROCEDURE — 80048 BASIC METABOLIC PNL TOTAL CA: CPT | Performed by: INTERNAL MEDICINE

## 2024-02-17 RX ADMIN — DONEPEZIL HYDROCHLORIDE 10 MG: 5 TABLET ORAL at 10:26

## 2024-02-17 RX ADMIN — POLYETHYLENE GLYCOL 3350 17 G: 17 POWDER, FOR SOLUTION ORAL at 10:35

## 2024-02-17 RX ADMIN — PANTOPRAZOLE SODIUM 20 MG: 20 TABLET, DELAYED RELEASE ORAL at 06:25

## 2024-02-17 RX ADMIN — LOSARTAN POTASSIUM 50 MG: 50 TABLET, FILM COATED ORAL at 10:26

## 2024-02-17 RX ADMIN — LEVETIRACETAM 500 MG: 500 TABLET, FILM COATED ORAL at 20:56

## 2024-02-17 RX ADMIN — TICAGRELOR 90 MG: 90 TABLET ORAL at 20:56

## 2024-02-17 RX ADMIN — SENNOSIDES AND DOCUSATE SODIUM 1 TABLET: 8.6; 5 TABLET ORAL at 10:34

## 2024-02-17 RX ADMIN — METOPROLOL SUCCINATE 100 MG: 50 TABLET, EXTENDED RELEASE ORAL at 10:26

## 2024-02-17 RX ADMIN — TAMSULOSIN HYDROCHLORIDE 0.4 MG: 0.4 CAPSULE ORAL at 18:29

## 2024-02-17 RX ADMIN — SENNOSIDES AND DOCUSATE SODIUM 1 TABLET: 8.6; 5 TABLET ORAL at 18:29

## 2024-02-17 RX ADMIN — TICAGRELOR 90 MG: 90 TABLET ORAL at 10:34

## 2024-02-17 RX ADMIN — TRAZODONE HYDROCHLORIDE 100 MG: 50 TABLET ORAL at 20:57

## 2024-02-17 RX ADMIN — ATORVASTATIN CALCIUM 40 MG: 40 TABLET, FILM COATED ORAL at 10:26

## 2024-02-17 RX ADMIN — Medication 5 MG: at 20:57

## 2024-02-17 RX ADMIN — ENOXAPARIN SODIUM 40 MG: 100 INJECTION SUBCUTANEOUS at 10:26

## 2024-02-17 RX ADMIN — LEVETIRACETAM 500 MG: 500 TABLET, FILM COATED ORAL at 10:34

## 2024-02-17 RX ADMIN — ASPIRIN 81 MG: 81 TABLET, COATED ORAL at 10:26

## 2024-02-17 NOTE — PROGRESS NOTES
[facility]  Progress Note  Name: Constanza Zhu I  MRN: [id number]  Unit/Bed#: -01 I Date of Admission: 2/7/2024   Date of Service: 2/17/2024 I Hospital Day: 8    Assessment/Plan   Generalized weakness  Assessment & Plan  Patient with a history of generalized weakness, prior admission discharged to [facility].  Patient arrives from home.  Typically walks with a walker  PT/OT- recommended rehab   CM placed referrals for rehab  Waiting for placement    Focal epilepsy (HCC)  Assessment & Plan  Patient with history of staring episodes, felt to be absence seizure's  Follows with neurology as outpatient  No acute events  continue Keppra 500 Mg    History of prediabetes  Assessment & Plan  Continue carb controlled diet and monitor sugars with BMP  Sugars remained stable    GERD (gastroesophageal reflux disease)  Assessment & Plan  Continue Protonix 20 mg    Primary hypertension  Assessment & Plan  Continue losartan and metoprolol  Patient hypertensive on admission, as needed labetalol IV for SBP greater than 180  Blood pressure control remains stable    History of stroke  Assessment & Plan  Patient with history of bilateral MCA strokes, with right stenosis and left stenosis with left ICA stent placement in May 2023  Baseline GCS 12, per EMS  GCS at time of admission 13, eye-opening to sound, confused, obeying commands  Follows with neurology outpatient  Per last neurology note BP goal of <130/80  Continue aspirin, Plavix, statin    * COVID  Assessment & Plan  Patient admitted in October 2023 for sepsis due to COVID, given IV remdesivir  Patient noted to have a fever of 101 at home today and was given 2 Tylenol,  Recurrent fever last evening of 100.6  Patient remains on room air  Completed remdesivir    AMS (altered mental status)-resolved as of 2/16/2024  Assessment & Plan  Patient with history of NPH, hypertension induced encephalopathy, recent diagnosis of COVID was found to have  increased lethargy and confusion around 3 PM this afternoon when his wife came to check on him.  Baseline GCS is typically greater than 12 given prior history of multiple strokes.  GCS at time of admission 13, patient is only oriented to self  CT head and neck:  No changes from prior imaging studies, no intracranial hemorrhage or mass, stable old infarcts  Altered mental status likely in the setting of COVID infection, mental status returned to baseline  Continue to monitor neuro status  Currently at baseline     Hypokalemia-resolved as of 2/12/2024  Assessment & Plan  Patient with potassium of 3.2 on admission  Received 40 mill equivalents potassium in the ED  Hold home HTZ 12.5mg  Hypokalemia resolved               VTE Pharmacologic Prophylaxis: VTE Score: 8 High Risk (Score >/= 5) - Pharmacological DVT Prophylaxis Ordered: enoxaparin (Lovenox). Sequential Compression Devices Ordered.    Mobility:   Basic Mobility Inpatient Raw Score: 19  JH-HLM Goal: 6: Walk 10 steps or more  JH-HLM Achieved: 7: Walk 25 feet or more  HLM Goal NOT achieved. Continue with multidisciplinary rounding and encourage appropriate mobility to improve upon HLM goals.    Patient Centered Rounds: I performed bedside rounds with nursing staff today.   Discussions with Specialists or Other Care Team Provider: cm    Education and Discussions with Family / Patient: Attempted to update  (wife) via phone. Left voicemail.     Total Time Spent on Date of Encounter in care of patient: 30 mins. This time was spent on one or more of the following: performing physical exam; counseling and coordination of care; obtaining or reviewing history; documenting in the medical record; reviewing/ordering tests, medications or procedures; communicating with other healthcare professionals and discussing with patient's family/caregivers.    Current Length of Stay: 8 day(s)  Current Patient Status: Inpatient   Certification Statement:  waiting for a  rehab bed  Discharge Plan: Anticipate discharge in 24-48 hrs to rehab facility.    Code Status: Level 1 - Full Code    Subjective:     No complaints. Had bm    Objective:     Vitals:   Temp (24hrs), Av.2 °F (36.8 °C), Min:98.2 °F (36.8 °C), Max:98.2 °F (36.8 °C)    Temp:  [98.2 °F (36.8 °C)] 98.2 °F (36.8 °C)  HR:  [66-78] 69  Resp:  [14] 14  BP: (131-145)/(63-86) 131/63  SpO2:  [95 %-98 %] 97 %  Body mass index is 27.55 kg/m².     Input and Output Summary (last 24 hours):     Intake/Output Summary (Last 24 hours) at 2024 1610  Last data filed at 2024 1101  Gross per 24 hour   Intake 480 ml   Output 700 ml   Net -220 ml       Physical Exam:   Physical Exam  Vitals and nursing note reviewed.   Constitutional:       General: He is not in acute distress.     Appearance: He is not diaphoretic.   HENT:      Head: Normocephalic.   Eyes:      General: No scleral icterus.        Right eye: No discharge.         Left eye: No discharge.   Cardiovascular:      Rate and Rhythm: Normal rate and regular rhythm.   Pulmonary:      Effort: Pulmonary effort is normal. No respiratory distress.      Breath sounds: Normal breath sounds. No wheezing, rhonchi or rales.   Abdominal:      General: There is no distension.      Palpations: Abdomen is soft.      Tenderness: There is no abdominal tenderness. There is no guarding or rebound.   Musculoskeletal:      Cervical back: Normal range of motion.      Right lower leg: No edema.      Left lower leg: No edema.   Skin:     General: Skin is warm.   Neurological:      Mental Status: He is alert.      Comments: Oriented to self and place    Psychiatric:         Mood and Affect: Mood normal.         Behavior: Behavior normal.         Thought Content: Thought content normal.         Judgment: Judgment normal.          Additional Data:     Labs:  Results from last 7 days   Lab Units 24  0344   WBC Thousand/uL 10.29*   HEMOGLOBIN g/dL 11.6*   HEMATOCRIT % 35.4*   PLATELETS  Thousands/uL 319     Results from last 7 days   Lab Units 02/17/24  0344   SODIUM mmol/L 140   POTASSIUM mmol/L 3.8   CHLORIDE mmol/L 108   CO2 mmol/L 26   BUN mg/dL 20   CREATININE mg/dL 0.79   ANION GAP mmol/L 6   CALCIUM mg/dL 8.9   GLUCOSE RANDOM mg/dL 87                       Lines/Drains:  Invasive Devices       Peripheral Intravenous Line  Duration             Peripheral IV 02/17/24 Dorsal (posterior);Right Forearm <1 day                          Imaging: No pertinent imaging reviewed.    Recent Cultures (last 7 days):         Last 24 Hours Medication List:   Current Facility-Administered Medications   Medication Dose Route Frequency Provider Last Rate    Artificial Tears  1 drop Both Eyes Q4H PRN Spenser Lopez MD      aspirin  81 mg Oral Daily Margarita Torres PA-C      atorvastatin  40 mg Oral Daily With Breakfast Margarita Torres PA-C      baclofen  5 mg Oral BID PRN Margarita Torres PA-C      donepezil  10 mg Oral Daily Margarita Torres PA-C      enoxaparin  40 mg Subcutaneous Daily Margarita Torres PA-C      levETIRAcetam  500 mg Oral Q12H JOSE Margarita Torres PA-C      losartan  50 mg Oral Daily Margarita Torres PA-C      melatonin  5 mg Oral HS Margarita Torres PA-C      metoprolol succinate  100 mg Oral Daily Margarita Torres PA-C      pantoprazole  20 mg Oral Early Morning Margarita Torres PA-C      polyethylene glycol  17 g Oral Daily Pilar Glaser MD      senna-docusate sodium  1 tablet Oral BID Pilar Glaser MD      tamsulosin  0.4 mg Oral Daily With Dinner Margarita Torres PA-C      ticagrelor  90 mg Oral Q12H JOSE Margarita Torres PA-C      traZODone  100 mg Oral HS Margarita Torres PA-C          Today, Patient Was Seen By: Pilar Glaser MD    **Please Note: This note may have been constructed using a voice recognition system.**

## 2024-02-17 NOTE — PLAN OF CARE
Problem: PAIN - ADULT  Goal: Verbalizes/displays adequate comfort level or baseline comfort level  Description: Interventions:  - Encourage patient to monitor pain and request assistance  - Assess pain using appropriate pain scale  - Administer analgesics based on type and severity of pain and evaluate response  - Implement non-pharmacological measures as appropriate and evaluate response  - Consider cultural and social influences on pain and pain management  - Notify physician/advanced practitioner if interventions unsuccessful or patient reports new pain  Outcome: Progressing     Problem: SAFETY ADULT  Goal: Patient will remain free of falls  Description: INTERVENTIONS:  - Educate patient/family on patient safety including physical limitations  - Instruct patient to call for assistance with activity   - Consult OT/PT to assist with strengthening/mobility   - Keep Call bell within reach  - Keep bed low and locked with side rails adjusted as appropriate  - Keep care items and personal belongings within reach  - Initiate and maintain comfort rounds  - Make Fall Risk Sign visible to staff  - Offer Toileting every 2 Hours, in advance of need  - Initiate/Maintain bed alarm  - Obtain necessary fall risk management equipment: socks   - Apply yellow socks and bracelet for high fall risk patients  - Consider moving patient to room near nurses station  Outcome: Progressing  Goal: Maintain or return to baseline ADL function  Description: INTERVENTIONS:  -  Assess patient's ability to carry out ADLs; assess patient's baseline for ADL function and identify physical deficits which impact ability to perform ADLs (bathing, care of mouth/teeth, toileting, grooming, dressing, etc.)  - Assess/evaluate cause of self-care deficits   - Assess range of motion  - Assess patient's mobility; develop plan if impaired  - Assess patient's need for assistive devices and provide as appropriate  - Encourage maximum independence but intervene  and supervise when necessary  - Involve family in performance of ADLs  - Assess for home care needs following discharge   - Consider OT consult to assist with ADL evaluation and planning for discharge  - Provide patient education as appropriate  Outcome: Progressing  Goal: Maintains/Returns to pre admission functional level  Description: INTERVENTIONS:  - Perform AM-PAC 6 Click Basic Mobility/ Daily Activity assessment daily.  - Set and communicate daily mobility goal to care team and patient/family/caregiver.   - Collaborate with rehabilitation services on mobility goals if consulted  - Perform Range of Motion 3 times a day.  - Reposition patient every 2 hours.  - Dangle patient 3 times a day  - Stand patient 3 times a day  - Ambulate patient 3 times a day  - Out of bed to chair 3 times a day   - Out of bed for meals 3 times a day  - Out of bed for toileting  - Record patient progress and toleration of activity level   Outcome: Progressing     Problem: DISCHARGE PLANNING  Goal: Discharge to home or other facility with appropriate resources  Description: INTERVENTIONS:  - Identify barriers to discharge w/patient and caregiver  - Arrange for needed discharge resources and transportation as appropriate  - Identify discharge learning needs (meds, wound care, etc.)  - Arrange for interpretive services to assist at discharge as needed  - Refer to Case Management Department for coordinating discharge planning if the patient needs post-hospital services based on physician/advanced practitioner order or complex needs related to functional status, cognitive ability, or social support system  Outcome: Progressing

## 2024-02-17 NOTE — ASSESSMENT & PLAN NOTE
Patient with a history of generalized weakness, prior admission discharged to [facility].  Patient arrives from home.  Typically walks with a walker  PT/OT- recommended rehab   CM placed referrals for rehab  Waiting for placement

## 2024-02-17 NOTE — PLAN OF CARE
Problem: PAIN - ADULT  Goal: Verbalizes/displays adequate comfort level or baseline comfort level  Description: Interventions:  - Encourage patient to monitor pain and request assistance  - Assess pain using appropriate pain scale  - Administer analgesics based on type and severity of pain and evaluate response  - Implement non-pharmacological measures as appropriate and evaluate response  - Consider cultural and social influences on pain and pain management  - Notify physician/advanced practitioner if interventions unsuccessful or patient reports new pain  Outcome: Progressing     Problem: INFECTION - ADULT  Goal: Absence or prevention of progression during hospitalization  Description: INTERVENTIONS:  - Assess and monitor for signs and symptoms of infection  - Monitor lab/diagnostic results  - Monitor all insertion sites, i.e. indwelling lines, tubes, and drains  - Monitor endotracheal if appropriate and nasal secretions for changes in amount and color  - [place] appropriate cooling/warming therapies per order  - Administer medications as ordered  - Instruct and encourage patient and family to use good hand hygiene technique  - Identify and instruct in appropriate isolation precautions for identified infection/condition  Outcome: Progressing     Problem: SAFETY ADULT  Goal: Patient will remain free of falls  Description: INTERVENTIONS:  - Educate patient/family on patient safety including physical limitations  - Instruct patient to call for assistance with activity   - Consult OT/PT to assist with strengthening/mobility   - Keep Call bell within reach  - Keep bed low and locked with side rails adjusted as appropriate  - Keep care items and personal belongings within reach  - Initiate and maintain comfort rounds  - Make Fall Risk Sign visible to staff  - Offer Toileting every 2 Hours, in advance of need  - Initiate/Maintain bed alarm  - Obtain necessary fall risk management equipment: socks   - Apply yellow  socks and bracelet for high fall risk patients  - Consider moving patient to room near nurses station  Outcome: Progressing  Goal: Maintain or return to baseline ADL function  Description: INTERVENTIONS:  -  Assess patient's ability to carry out ADLs; assess patient's baseline for ADL function and identify physical deficits which impact ability to perform ADLs (bathing, care of mouth/teeth, toileting, grooming, dressing, etc.)  - Assess/evaluate cause of self-care deficits   - Assess range of motion  - Assess patient's mobility; develop plan if impaired  - Assess patient's need for assistive devices and provide as appropriate  - Encourage maximum independence but intervene and supervise when necessary  - Involve family in performance of ADLs  - Assess for home care needs following discharge   - Consider OT consult to assist with ADL evaluation and planning for discharge  - Provide patient education as appropriate  Outcome: Progressing  Goal: Maintains/Returns to pre admission functional level  Description: INTERVENTIONS:  - Perform AM-PAC 6 Click Basic Mobility/ Daily Activity assessment daily.  - Set and communicate daily mobility goal to care team and patient/family/caregiver.   - Collaborate with rehabilitation services on mobility goals if consulted  - Perform Range of Motion 3 times a day.  - Reposition patient every 2 hours.  - Dangle patient 3 times a day  - Stand patient 3 times a day  - Ambulate patient 3 times a day  - Out of bed to chair 3 times a day   - Out of bed for meals 3 times a day  - Out of bed for toileting  - Record patient progress and toleration of activity level   Outcome: Progressing     Problem: DISCHARGE PLANNING  Goal: Discharge to home or other facility with appropriate resources  Description: INTERVENTIONS:  - Identify barriers to discharge w/patient and caregiver  - Arrange for needed discharge resources and transportation as appropriate  - Identify discharge learning needs (meds,  wound care, etc.)  - Arrange for interpretive services to assist at discharge as needed  - Refer to Case Management Department for coordinating discharge planning if the patient needs post-hospital services based on physician/advanced practitioner order or complex needs related to functional status, cognitive ability, or social support system  Outcome: Progressing     Problem: Knowledge Deficit  Goal: Patient/family/caregiver demonstrates understanding of disease process, treatment plan, medications, and discharge instructions  Description: Complete learning assessment and assess knowledge base.  Interventions:  - Provide teaching at level of understanding  - Provide teaching via preferred learning methods  Outcome: Progressing     Problem: Nutrition/Hydration-ADULT  Goal: Nutrient/Hydration intake appropriate for improving, restoring or maintaining nutritional needs  Description: Monitor and assess patient's nutrition/hydration status for malnutrition. Collaborate with interdisciplinary team and initiate plan and interventions as ordered.  Monitor patient's weight and dietary intake as ordered or per policy. Utilize nutrition screening tool and intervene as necessary. Determine patient's food preferences and provide high-protein, high-caloric foods as appropriate.     INTERVENTIONS:  - Monitor oral intake, urinary output, labs, and treatment plans  - Assess nutrition and hydration status and recommend course of action  - Evaluate amount of meals eaten  - Assist patient with eating if necessary   - Allow adequate time for meals  - Recommend/ encourage appropriate diets, oral nutritional supplements, and vitamin/mineral supplements  - Order, calculate, and assess calorie counts as needed  - Recommend, monitor, and adjust tube feedings and TPN/PPN based on assessed needs  - Assess need for intravenous fluids  - Provide specific nutrition/hydration education as appropriate  - Include patient/family/caregiver in  decisions related to nutrition  Outcome: Progressing     Problem: MOBILITY - ADULT  Goal: Maintain or return to baseline ADL function  Description: INTERVENTIONS:  -  Assess patient's ability to carry out ADLs; assess patient's baseline for ADL function and identify physical deficits which impact ability to perform ADLs (bathing, care of mouth/teeth, toileting, grooming, dressing, etc.)  - Assess/evaluate cause of self-care deficits   - Assess range of motion  - Assess patient's mobility; develop plan if impaired  - Assess patient's need for assistive devices and provide as appropriate  - Encourage maximum independence but intervene and supervise when necessary  - Involve family in performance of ADLs  - Assess for home care needs following discharge   - Consider OT consult to assist with ADL evaluation and planning for discharge  - Provide patient education as appropriate  Outcome: Progressing  Goal: Maintains/Returns to pre admission functional level  Description: INTERVENTIONS:  - Perform AM-PAC 6 Click Basic Mobility/ Daily Activity assessment daily.  - Set and communicate daily mobility goal to care team and patient/family/caregiver.   - Collaborate with rehabilitation services on mobility goals if consulted  - Perform Range of Motion 3 times a day.  - Reposition patient every 2 hours.  - Dangle patient 3 times a day  - Stand patient 3 times a day  - Ambulate patient 3 times a day  - Out of bed to chair 3 times a day   - Out of bed for meals 3 times a day  - Out of bed for toileting  - Record patient progress and toleration of activity level   Outcome: Progressing     Problem: NEUROSENSORY - ADULT  Goal: Achieves stable or improved neurological status  Description: INTERVENTIONS  - Monitor and report changes in neurological status  - Monitor vital signs such as temperature, blood pressure, glucose, and any other labs ordered   - Initiate measures to prevent increased intracranial pressure  - Monitor for seizure  activity and implement precautions if appropriate      Outcome: Progressing  Goal: Remains free of injury related to seizures activity  Description: INTERVENTIONS  - Maintain airway, patient safety  and administer oxygen as ordered  - Monitor patient for seizure activity, document and report duration and description of seizure to physician/advanced practitioner  - If seizure occurs,  ensure patient safety during seizure  - Reorient patient post seizure  - Seizure pads on all 4 side rails  - Instruct patient/family to notify RN of any seizure activity including if an aura is experienced  - Instruct patient/family to call for assistance with activity based on nursing assessment  - Administer anti-seizure medications if ordered    Outcome: Progressing  Goal: Achieves maximal functionality and self care  Description: INTERVENTIONS  - Monitor swallowing and airway patency with patient fatigue and changes in neurological status  - Encourage and assist patient to increase activity and self care.   - Encourage visually impaired, hearing impaired and aphasic patients to use assistive/communication devices  Outcome: Progressing     Problem: CARDIOVASCULAR - ADULT  Goal: Maintains optimal cardiac output and hemodynamic stability  Description: INTERVENTIONS:  - Monitor I/O, vital signs and rhythm  - Monitor for S/S and trends of decreased cardiac output  - Administer and titrate ordered vasoactive medications to optimize hemodynamic stability  - Assess quality of pulses, skin color and temperature  - Assess for signs of decreased coronary artery perfusion  - Instruct patient to report change in severity of symptoms  Outcome: Progressing  Goal: Absence of cardiac dysrhythmias or at baseline rhythm  Description: INTERVENTIONS:  - Continuous cardiac monitoring, vital signs, obtain 12 lead EKG if ordered  - Administer antiarrhythmic and heart rate control medications as ordered  - Monitor electrolytes and administer replacement  therapy as ordered  Outcome: Progressing     Problem: RESPIRATORY - ADULT  Goal: Achieves optimal ventilation and oxygenation  Description: INTERVENTIONS:  - Assess for changes in respiratory status  - Assess for changes in mentation and behavior  - Position to facilitate oxygenation and minimize respiratory effort  - Oxygen administered by appropriate delivery if ordered  - Initiate smoking cessation education as indicated  - Encourage broncho-pulmonary hygiene including cough, deep breathe, Incentive Spirometry  - Assess the need for suctioning and aspirate as needed  - Assess and instruct to report SOB or any respiratory difficulty  - Respiratory Therapy support as indicated  Outcome: Progressing     Problem: METABOLIC, FLUID AND ELECTROLYTES - ADULT  Goal: Electrolytes maintained within normal limits  Description: INTERVENTIONS:  - Monitor labs and assess patient for signs and symptoms of electrolyte imbalances  - Administer electrolyte replacement as ordered  - Monitor response to electrolyte replacements, including repeat lab results as appropriate  - Instruct patient on fluid and nutrition as appropriate  Outcome: Progressing  Goal: Fluid balance maintained  Description: INTERVENTIONS:  - Monitor labs   - Monitor I/O and WT  - Instruct patient on fluid and nutrition as appropriate  - Assess for signs & symptoms of volume excess or deficit  Outcome: Progressing  Goal: Glucose maintained within target range  Description: INTERVENTIONS:  - Monitor Blood Glucose as ordered  - Assess for signs and symptoms of hyperglycemia and hypoglycemia  - Administer ordered medications to maintain glucose within target range  - Assess nutritional intake and initiate nutrition service referral as needed  Outcome: Progressing     Problem: SKIN/TISSUE INTEGRITY - ADULT  Goal: Skin Integrity remains intact(Skin Breakdown Prevention)  Description: Assess:  -Perform Arnaud assessment every shift  -Clean and moisturize skin every  day  -Inspect skin when repositioning, toileting, and assisting with ADLS  -Assess extremities for adequate circulation and sensation     Bed Management:  -Have minimal linens on bed & keep smooth, unwrinkled  -Change linens as needed when moist or perspiring  -Avoid sitting or lying in one position for more than 2 hours while in bed  -Keep HOB at 30 degrees     Toileting:  -Offer bedside commode  -Assess for incontinence every shift  -Use incontinent care products after each incontinent episode such as faom    Activity:  -Mobilize patient 3 times a day  -Encourage activity and walks on unit  -Encourage or provide ROM exercises   -Turn and reposition patient every 8 Hours  -Use appropriate equipment to lift or move patient in bed  -Instruct/ Assist with weight shifting every 2 when out of bed in chair  -Consider limitation of chair time 120 hour intervals    Skin Care:  -Avoid use of baby powder, tape, friction and shearing, hot water or constrictive clothing  -Relieve pressure over bony prominences using wedges  -Do not massage red bony areas    Next Steps:  -Teach patient strategies to minimize risks such as fall   -Consider consults to  interdisciplinary teams such as PT/OT  Outcome: Progressing  Goal: Incision(s), wounds(s) or drain site(s) healing without S/S of infection  Description: INTERVENTIONS  - Assess and document dressing, incision, wound bed, drain sites and surrounding tissue  - Provide patient and family education  - Perform skin care/dressing changes as ordered  Outcome: Progressing     Problem: MUSCULOSKELETAL - ADULT  Goal: Maintain or return mobility to safest level of function  Description: INTERVENTIONS:  - Assess patient's ability to carry out ADLs; assess patient's baseline for ADL function and identify physical deficits which impact ability to perform ADLs (bathing, care of mouth/teeth, toileting, grooming, dressing, etc.)  - Assess/evaluate cause of self-care deficits   - Assess range of  motion  - Assess patient's mobility  - Assess patient's need for assistive devices and provide as appropriate  - Encourage maximum independence but intervene and supervise when necessary  - Involve family in performance of ADLs  - Assess for home care needs following discharge   - Consider OT consult to assist with ADL evaluation and planning for discharge  - Provide patient education as appropriate  Outcome: Progressing  Goal: Maintain proper alignment of affected body part  Description: INTERVENTIONS:  - Support, maintain and protect limb and body alignment  - Provide patient/ family with appropriate education  Outcome: Progressing     Problem: Prexisting or High Potential for Compromised Skin Integrity  Goal: Skin integrity is maintained or improved  Description: INTERVENTIONS:  - Identify patients at risk for skin breakdown  - Assess and monitor skin integrity  - Assess and monitor nutrition and hydration status  - Monitor labs   - Assess for incontinence   - Turn and reposition patient  - Assist with mobility/ambulation  - Relieve pressure over bony prominences  - Avoid friction and shearing  - Provide appropriate hygiene as needed including keeping skin clean and dry  - Evaluate need for skin moisturizer/barrier cream  - Collaborate with interdisciplinary team   - Patient/family teaching  - Consider wound care consult   Outcome: Progressing

## 2024-02-18 LAB
ERYTHROCYTE [DISTWIDTH] IN BLOOD BY AUTOMATED COUNT: 13.2 % (ref 11.6–15.1)
HCT VFR BLD AUTO: 37 % (ref 36.5–49.3)
HGB BLD-MCNC: 12.2 G/DL (ref 12–17)
MCH RBC QN AUTO: 31.1 PG (ref 26.8–34.3)
MCHC RBC AUTO-ENTMCNC: 33 G/DL (ref 31.4–37.4)
MCV RBC AUTO: 94 FL (ref 82–98)
PLATELET # BLD AUTO: 309 THOUSANDS/UL (ref 149–390)
PMV BLD AUTO: 9.4 FL (ref 8.9–12.7)
RBC # BLD AUTO: 3.92 MILLION/UL (ref 3.88–5.62)
WBC # BLD AUTO: 10.3 THOUSAND/UL (ref 4.31–10.16)

## 2024-02-18 PROCEDURE — 99232 SBSQ HOSP IP/OBS MODERATE 35: CPT | Performed by: INTERNAL MEDICINE

## 2024-02-18 PROCEDURE — 85027 COMPLETE CBC AUTOMATED: CPT | Performed by: INTERNAL MEDICINE

## 2024-02-18 RX ADMIN — SENNOSIDES AND DOCUSATE SODIUM 1 TABLET: 8.6; 5 TABLET ORAL at 18:15

## 2024-02-18 RX ADMIN — TRAZODONE HYDROCHLORIDE 100 MG: 50 TABLET ORAL at 21:13

## 2024-02-18 RX ADMIN — Medication 5 MG: at 21:13

## 2024-02-18 RX ADMIN — ATORVASTATIN CALCIUM 40 MG: 40 TABLET, FILM COATED ORAL at 08:45

## 2024-02-18 RX ADMIN — LOSARTAN POTASSIUM 50 MG: 50 TABLET, FILM COATED ORAL at 08:46

## 2024-02-18 RX ADMIN — LEVETIRACETAM 500 MG: 500 TABLET, FILM COATED ORAL at 08:45

## 2024-02-18 RX ADMIN — TAMSULOSIN HYDROCHLORIDE 0.4 MG: 0.4 CAPSULE ORAL at 16:25

## 2024-02-18 RX ADMIN — ENOXAPARIN SODIUM 40 MG: 100 INJECTION SUBCUTANEOUS at 08:45

## 2024-02-18 RX ADMIN — TICAGRELOR 90 MG: 90 TABLET ORAL at 21:13

## 2024-02-18 RX ADMIN — DONEPEZIL HYDROCHLORIDE 10 MG: 5 TABLET ORAL at 08:45

## 2024-02-18 RX ADMIN — LEVETIRACETAM 500 MG: 500 TABLET, FILM COATED ORAL at 21:13

## 2024-02-18 RX ADMIN — SENNOSIDES AND DOCUSATE SODIUM 1 TABLET: 8.6; 5 TABLET ORAL at 08:45

## 2024-02-18 RX ADMIN — METOPROLOL SUCCINATE 100 MG: 50 TABLET, EXTENDED RELEASE ORAL at 08:45

## 2024-02-18 RX ADMIN — TICAGRELOR 90 MG: 90 TABLET ORAL at 09:12

## 2024-02-18 RX ADMIN — ASPIRIN 81 MG: 81 TABLET, COATED ORAL at 08:45

## 2024-02-18 RX ADMIN — POLYETHYLENE GLYCOL 3350 17 G: 17 POWDER, FOR SOLUTION ORAL at 08:45

## 2024-02-18 RX ADMIN — PANTOPRAZOLE SODIUM 20 MG: 20 TABLET, DELAYED RELEASE ORAL at 05:30

## 2024-02-18 NOTE — ASSESSMENT & PLAN NOTE
Problem: Potential for Falls  Goal: Patient will remain free of falls  Description: INTERVENTIONS:  - Educate patient/family on patient safety including physical limitations  - Instruct patient to call for assistance with activity   - Consult OT/PT to assist with strengthening/mobility   - Keep Call bell within reach  - Keep bed low and locked with side rails adjusted as appropriate  - Keep care items and personal belongings within reach  - Initiate and maintain comfort rounds  - Make Fall Risk Sign visible to staff  - Offer Toileting every 2 Hours, in advance of need  - Initiate/Maintain 2alarm  - Obtain necessary fall risk management equipment: 2  - Apply yellow socks and bracelet for high fall risk patients  - Consider moving patient to room near nurses station  Outcome: Progressing     Problem: PAIN - ADULT  Goal: Verbalizes/displays adequate comfort level or baseline comfort level  Description: Interventions:  - Encourage patient to monitor pain and request assistance  - Assess pain using appropriate pain scale  - Administer analgesics based on type and severity of pain and evaluate response  - Implement non-pharmacological measures as appropriate and evaluate response  - Consider cultural and social influences on pain and pain management  - Notify physician/advanced practitioner if interventions unsuccessful or patient reports new pain  Outcome: Progressing     Problem: INFECTION - ADULT  Goal: Absence or prevention of progression during hospitalization  Description: INTERVENTIONS:  - Assess and monitor for signs and symptoms of infection  - Monitor lab/diagnostic results  - Monitor all insertion sites, i.e. indwelling lines, tubes, and drains  - Monitor endotracheal if appropriate and nasal secretions for changes in amount and color  - [place] appropriate cooling/warming therapies per order  - Administer medications as ordered  - Instruct and encourage patient and family to use good hand hygiene  Patient with a history of generalized weakness, prior admission discharged to [facility].  Patient arrives from home.  Typically walks with a walker  PT/OT- recommended rehab   CM placed referrals for rehab  Waiting for placement   technique  - Identify and instruct in appropriate isolation precautions for identified infection/condition  Outcome: Progressing  Goal: Absence of fever/infection during neutropenic period  Description: INTERVENTIONS:  - Monitor WBC    Outcome: Progressing     Problem: SAFETY ADULT  Goal: Patient will remain free of falls  Description: INTERVENTIONS:  - Educate patient/family on patient safety including physical limitations  - Instruct patient to call for assistance with activity   - Consult OT/PT to assist with strengthening/mobility   - Keep Call bell within reach  - Keep bed low and locked with side rails adjusted as appropriate  - Keep care items and personal belongings within reach  - Initiate and maintain comfort rounds  - Make Fall Risk Sign visible to staff  - Offer Toileting every 2 Hours, in advance of need  - Initiate/Maintain 2alarm  - Obtain necessary fall risk management equipment: 2  - Apply yellow socks and bracelet for high fall risk patients  - Consider moving patient to room near nurses station  Outcome: Progressing  Goal: Maintain or return to baseline ADL function  Description: INTERVENTIONS:  -  Assess patient's ability to carry out ADLs; assess patient's baseline for ADL function and identify physical deficits which impact ability to perform ADLs (bathing, care of mouth/teeth, toileting, grooming, dressing, etc.)  - Assess/evaluate cause of self-care deficits   - Assess range of motion  - Assess patient's mobility; develop plan if impaired  - Assess patient's need for assistive devices and provide as appropriate  - Encourage maximum independence but intervene and supervise when necessary  - Involve family in performance of ADLs  - Assess for home care needs following discharge   - Consider OT consult to assist with ADL evaluation and planning for discharge  - Provide patient education as appropriate  Outcome: Progressing  Goal: Maintains/Returns to pre admission functional level  Description:  INTERVENTIONS:  - Perform AM-PAC 6 Click Basic Mobility/ Daily Activity assessment daily.  - Set and communicate daily mobility goal to care team and patient/family/caregiver.   - Collaborate with rehabilitation services on mobility goals if consulted  - Perform Range of Motion 2 times a day.  - Reposition patient every 2 hours.  - Dangle patient 2 times a day  - Stand patient 2 times a day  - Ambulate patient 2 times a day  - Out of bed to chair 2 times a day   - Out of bed for meals 2 times a day  - Out of bed for toileting  - Record patient progress and toleration of activity level   Outcome: Progressing     Problem: DISCHARGE PLANNING  Goal: Discharge to home or other facility with appropriate resources  Description: INTERVENTIONS:  - Identify barriers to discharge w/patient and caregiver  - Arrange for needed discharge resources and transportation as appropriate  - Identify discharge learning needs (meds, wound care, etc.)  - Arrange for interpretive services to assist at discharge as needed  - Refer to Case Management Department for coordinating discharge planning if the patient needs post-hospital services based on physician/advanced practitioner order or complex needs related to functional status, cognitive ability, or social support system  Outcome: Progressing     Problem: Knowledge Deficit  Goal: Patient/family/caregiver demonstrates understanding of disease process, treatment plan, medications, and discharge instructions  Description: Complete learning assessment and assess knowledge base.  Interventions:  - Provide teaching at level of understanding  - Provide teaching via preferred learning methods  Outcome: Progressing     Problem: RESPIRATORY - ADULT  Goal: Achieves optimal ventilation and oxygenation  Description: INTERVENTIONS:  - Assess for changes in respiratory status  - Assess for changes in mentation and behavior  - Position to facilitate oxygenation and minimize respiratory effort  - Oxygen  administered by appropriate delivery if ordered  - Initiate smoking cessation education as indicated  - Encourage broncho-pulmonary hygiene including cough, deep breathe, Incentive Spirometry  - Assess the need for suctioning and aspirate as needed  - Assess and instruct to report SOB or any respiratory difficulty  - Respiratory Therapy support as indicated  Outcome: Progressing     Problem: GASTROINTESTINAL - ADULT  Goal: Maintains or returns to baseline bowel function  Description: INTERVENTIONS:  - Assess bowel function  - Encourage oral fluids to ensure adequate hydration  - Administer IV fluids if ordered to ensure adequate hydration  - Administer ordered medications as needed  - Encourage mobilization and activity  - Consider nutritional services referral to assist patient with adequate nutrition and appropriate food choices  Outcome: Progressing     Problem: GENITOURINARY - ADULT  Goal: Absence of urinary retention  Description: INTERVENTIONS:  - Assess patient’s ability to void and empty bladder  - Monitor I/O  - Bladder scan as needed  - Discuss with physician/AP medications to alleviate retention as needed  - Discuss catheterization for long term situations as appropriate  Outcome: Progressing

## 2024-02-18 NOTE — PLAN OF CARE
Problem: PAIN - ADULT  Goal: Verbalizes/displays adequate comfort level or baseline comfort level  Description: Interventions:  - Encourage patient to monitor pain and request assistance  - Assess pain using appropriate pain scale  - Administer analgesics based on type and severity of pain and evaluate response  - Implement non-pharmacological measures as appropriate and evaluate response  - Consider cultural and social influences on pain and pain management  - Notify physician/advanced practitioner if interventions unsuccessful or patient reports new pain  Outcome: Progressing     Problem: INFECTION - ADULT  Goal: Absence or prevention of progression during hospitalization  Description: INTERVENTIONS:  - Assess and monitor for signs and symptoms of infection  - Monitor lab/diagnostic results  - Monitor all insertion sites, i.e. indwelling lines, tubes, and drains  - Monitor endotracheal if appropriate and nasal secretions for changes in amount and color  - [place] appropriate cooling/warming therapies per order  - Administer medications as ordered  - Instruct and encourage patient and family to use good hand hygiene technique  - Identify and instruct in appropriate isolation precautions for identified infection/condition  Outcome: Progressing     Problem: SAFETY ADULT  Goal: Patient will remain free of falls  Description: INTERVENTIONS:  - Educate patient/family on patient safety including physical limitations  - Instruct patient to call for assistance with activity   - Consult OT/PT to assist with strengthening/mobility   - Keep Call bell within reach  - Keep bed low and locked with side rails adjusted as appropriate  - Keep care items and personal belongings within reach  - Initiate and maintain comfort rounds  - Make Fall Risk Sign visible to staff  - Offer Toileting every 2 Hours, in advance of need  - Initiate/Maintain bed alarm  - Obtain necessary fall risk management equipment: socks   - Apply yellow  socks and bracelet for high fall risk patients  - Consider moving patient to room near nurses station  Outcome: Progressing  Goal: Maintain or return to baseline ADL function  Description: INTERVENTIONS:  -  Assess patient's ability to carry out ADLs; assess patient's baseline for ADL function and identify physical deficits which impact ability to perform ADLs (bathing, care of mouth/teeth, toileting, grooming, dressing, etc.)  - Assess/evaluate cause of self-care deficits   - Assess range of motion  - Assess patient's mobility; develop plan if impaired  - Assess patient's need for assistive devices and provide as appropriate  - Encourage maximum independence but intervene and supervise when necessary  - Involve family in performance of ADLs  - Assess for home care needs following discharge   - Consider OT consult to assist with ADL evaluation and planning for discharge  - Provide patient education as appropriate  Outcome: Progressing  Goal: Maintains/Returns to pre admission functional level  Description: INTERVENTIONS:  - Perform AM-PAC 6 Click Basic Mobility/ Daily Activity assessment daily.  - Set and communicate daily mobility goal to care team and patient/family/caregiver.   - Collaborate with rehabilitation services on mobility goals if consulted  - Perform Range of Motion 3 times a day.  - Reposition patient every 2 hours.  - Dangle patient 3 times a day  - Stand patient 3 times a day  - Ambulate patient 3 times a day  - Out of bed to chair 3 times a day   - Out of bed for meals 3 times a day  - Out of bed for toileting  - Record patient progress and toleration of activity level   Outcome: Progressing     Problem: DISCHARGE PLANNING  Goal: Discharge to home or other facility with appropriate resources  Description: INTERVENTIONS:  - Identify barriers to discharge w/patient and caregiver  - Arrange for needed discharge resources and transportation as appropriate  - Identify discharge learning needs (meds,  wound care, etc.)  - Arrange for interpretive services to assist at discharge as needed  - Refer to Case Management Department for coordinating discharge planning if the patient needs post-hospital services based on physician/advanced practitioner order or complex needs related to functional status, cognitive ability, or social support system  Outcome: Progressing     Problem: Knowledge Deficit  Goal: Patient/family/caregiver demonstrates understanding of disease process, treatment plan, medications, and discharge instructions  Description: Complete learning assessment and assess knowledge base.  Interventions:  - Provide teaching at level of understanding  - Provide teaching via preferred learning methods  Outcome: Progressing     Problem: Nutrition/Hydration-ADULT  Goal: Nutrient/Hydration intake appropriate for improving, restoring or maintaining nutritional needs  Description: Monitor and assess patient's nutrition/hydration status for malnutrition. Collaborate with interdisciplinary team and initiate plan and interventions as ordered.  Monitor patient's weight and dietary intake as ordered or per policy. Utilize nutrition screening tool and intervene as necessary. Determine patient's food preferences and provide high-protein, high-caloric foods as appropriate.     INTERVENTIONS:  - Monitor oral intake, urinary output, labs, and treatment plans  - Assess nutrition and hydration status and recommend course of action  - Evaluate amount of meals eaten  - Assist patient with eating if necessary   - Allow adequate time for meals  - Recommend/ encourage appropriate diets, oral nutritional supplements, and vitamin/mineral supplements  - Order, calculate, and assess calorie counts as needed  - Recommend, monitor, and adjust tube feedings and TPN/PPN based on assessed needs  - Assess need for intravenous fluids  - Provide specific nutrition/hydration education as appropriate  - Include patient/family/caregiver in  decisions related to nutrition  Outcome: Progressing     Problem: MOBILITY - ADULT  Goal: Maintain or return to baseline ADL function  Description: INTERVENTIONS:  -  Assess patient's ability to carry out ADLs; assess patient's baseline for ADL function and identify physical deficits which impact ability to perform ADLs (bathing, care of mouth/teeth, toileting, grooming, dressing, etc.)  - Assess/evaluate cause of self-care deficits   - Assess range of motion  - Assess patient's mobility; develop plan if impaired  - Assess patient's need for assistive devices and provide as appropriate  - Encourage maximum independence but intervene and supervise when necessary  - Involve family in performance of ADLs  - Assess for home care needs following discharge   - Consider OT consult to assist with ADL evaluation and planning for discharge  - Provide patient education as appropriate  Outcome: Progressing  Goal: Maintains/Returns to pre admission functional level  Description: INTERVENTIONS:  - Perform AM-PAC 6 Click Basic Mobility/ Daily Activity assessment daily.  - Set and communicate daily mobility goal to care team and patient/family/caregiver.   - Collaborate with rehabilitation services on mobility goals if consulted  - Perform Range of Motion 3 times a day.  - Reposition patient every 2 hours.  - Dangle patient 3 times a day  - Stand patient 3 times a day  - Ambulate patient 3 times a day  - Out of bed to chair 3 times a day   - Out of bed for meals 3 times a day  - Out of bed for toileting  - Record patient progress and toleration of activity level   Outcome: Progressing     Problem: NEUROSENSORY - ADULT  Goal: Achieves stable or improved neurological status  Description: INTERVENTIONS  - Monitor and report changes in neurological status  - Monitor vital signs such as temperature, blood pressure, glucose, and any other labs ordered   - Initiate measures to prevent increased intracranial pressure  - Monitor for seizure  activity and implement precautions if appropriate      Outcome: Progressing  Goal: Remains free of injury related to seizures activity  Description: INTERVENTIONS  - Maintain airway, patient safety  and administer oxygen as ordered  - Monitor patient for seizure activity, document and report duration and description of seizure to physician/advanced practitioner  - If seizure occurs,  ensure patient safety during seizure  - Reorient patient post seizure  - Seizure pads on all 4 side rails  - Instruct patient/family to notify RN of any seizure activity including if an aura is experienced  - Instruct patient/family to call for assistance with activity based on nursing assessment  - Administer anti-seizure medications if ordered    Outcome: Progressing  Goal: Achieves maximal functionality and self care  Description: INTERVENTIONS  - Monitor swallowing and airway patency with patient fatigue and changes in neurological status  - Encourage and assist patient to increase activity and self care.   - Encourage visually impaired, hearing impaired and aphasic patients to use assistive/communication devices  Outcome: Progressing     Problem: CARDIOVASCULAR - ADULT  Goal: Maintains optimal cardiac output and hemodynamic stability  Description: INTERVENTIONS:  - Monitor I/O, vital signs and rhythm  - Monitor for S/S and trends of decreased cardiac output  - Administer and titrate ordered vasoactive medications to optimize hemodynamic stability  - Assess quality of pulses, skin color and temperature  - Assess for signs of decreased coronary artery perfusion  - Instruct patient to report change in severity of symptoms  Outcome: Progressing  Goal: Absence of cardiac dysrhythmias or at baseline rhythm  Description: INTERVENTIONS:  - Continuous cardiac monitoring, vital signs, obtain 12 lead EKG if ordered  - Administer antiarrhythmic and heart rate control medications as ordered  - Monitor electrolytes and administer replacement  therapy as ordered  Outcome: Progressing     Problem: RESPIRATORY - ADULT  Goal: Achieves optimal ventilation and oxygenation  Description: INTERVENTIONS:  - Assess for changes in respiratory status  - Assess for changes in mentation and behavior  - Position to facilitate oxygenation and minimize respiratory effort  - Oxygen administered by appropriate delivery if ordered  - Initiate smoking cessation education as indicated  - Encourage broncho-pulmonary hygiene including cough, deep breathe, Incentive Spirometry  - Assess the need for suctioning and aspirate as needed  - Assess and instruct to report SOB or any respiratory difficulty  - Respiratory Therapy support as indicated  Outcome: Progressing     Problem: METABOLIC, FLUID AND ELECTROLYTES - ADULT  Goal: Electrolytes maintained within normal limits  Description: INTERVENTIONS:  - Monitor labs and assess patient for signs and symptoms of electrolyte imbalances  - Administer electrolyte replacement as ordered  - Monitor response to electrolyte replacements, including repeat lab results as appropriate  - Instruct patient on fluid and nutrition as appropriate  Outcome: Progressing  Goal: Fluid balance maintained  Description: INTERVENTIONS:  - Monitor labs   - Monitor I/O and WT  - Instruct patient on fluid and nutrition as appropriate  - Assess for signs & symptoms of volume excess or deficit  Outcome: Progressing  Goal: Glucose maintained within target range  Description: INTERVENTIONS:  - Monitor Blood Glucose as ordered  - Assess for signs and symptoms of hyperglycemia and hypoglycemia  - Administer ordered medications to maintain glucose within target range  - Assess nutritional intake and initiate nutrition service referral as needed  Outcome: Progressing     Problem: SKIN/TISSUE INTEGRITY - ADULT  Goal: Skin Integrity remains intact(Skin Breakdown Prevention)  Description: Assess:  -Perform Arnaud assessment every shift  -Clean and moisturize skin every  day  -Inspect skin when repositioning, toileting, and assisting with ADLS  -Assess extremities for adequate circulation and sensation     Bed Management:  -Have minimal linens on bed & keep smooth, unwrinkled  -Change linens as needed when moist or perspiring  -Avoid sitting or lying in one position for more than 2 hours while in bed  -Keep HOB at 30 degrees     Toileting:  -Offer bedside commode  -Assess for incontinence every shift  -Use incontinent care products after each incontinent episode such as faom    Activity:  -Mobilize patient 3 times a day  -Encourage activity and walks on unit  -Encourage or provide ROM exercises   -Turn and reposition patient every 8 Hours  -Use appropriate equipment to lift or move patient in bed  -Instruct/ Assist with weight shifting every 2 when out of bed in chair  -Consider limitation of chair time 120 hour intervals    Skin Care:  -Avoid use of baby powder, tape, friction and shearing, hot water or constrictive clothing  -Relieve pressure over bony prominences using wedges  -Do not massage red bony areas    Next Steps:  -Teach patient strategies to minimize risks such as fall   -Consider consults to  interdisciplinary teams such as PT/OT  Outcome: Progressing  Goal: Incision(s), wounds(s) or drain site(s) healing without S/S of infection  Description: INTERVENTIONS  - Assess and document dressing, incision, wound bed, drain sites and surrounding tissue  - Provide patient and family education  - Perform skin care/dressing changes as ordered  Outcome: Progressing  Goal: Pressure injury heals and does not worsen  Description: Interventions:  - Implement low air loss mattress or specialty surface (Criteria met)  - Apply silicone foam dressing  - Instruct/assist with weight shifting every 30 minutes when in chair   - Limit chair time to 4 hour intervals  - Use special pressure reducing interventions such as EHOB cusion when in chair   - Apply fecal or urinary incontinence  containment device   - Perform passive or active ROM every 4 hours  - Turn and reposition patient & offload bony prominences every 2 hours   - Utilize friction reducing device or surface for transfers   - Consider consults to  interdisciplinary teams such as PT/OT/nutrition/WC  - Use incontinent care products after each incontinent episode such as calazime cream  - Consider nutrition services referral as needed  Outcome: Progressing     Problem: MUSCULOSKELETAL - ADULT  Goal: Maintain or return mobility to safest level of function  Description: INTERVENTIONS:  - Assess patient's ability to carry out ADLs; assess patient's baseline for ADL function and identify physical deficits which impact ability to perform ADLs (bathing, care of mouth/teeth, toileting, grooming, dressing, etc.)  - Assess/evaluate cause of self-care deficits   - Assess range of motion  - Assess patient's mobility  - Assess patient's need for assistive devices and provide as appropriate  - Encourage maximum independence but intervene and supervise when necessary  - Involve family in performance of ADLs  - Assess for home care needs following discharge   - Consider OT consult to assist with ADL evaluation and planning for discharge  - Provide patient education as appropriate  Outcome: Progressing  Goal: Maintain proper alignment of affected body part  Description: INTERVENTIONS:  - Support, maintain and protect limb and body alignment  - Provide patient/ family with appropriate education  Outcome: Progressing     Problem: Prexisting or High Potential for Compromised Skin Integrity  Goal: Skin integrity is maintained or improved  Description: INTERVENTIONS:  - Identify patients at risk for skin breakdown  - Assess and monitor skin integrity  - Assess and monitor nutrition and hydration status  - Monitor labs   - Assess for incontinence   - Turn and reposition patient  - Assist with mobility/ambulation  - Relieve pressure over bony prominences  -  Avoid friction and shearing  - Provide appropriate hygiene as needed including keeping skin clean and dry  - Evaluate need for skin moisturizer/barrier cream  - Collaborate with interdisciplinary team   - Patient/family teaching  - Consider wound care consult   Outcome: Progressing

## 2024-02-18 NOTE — PLAN OF CARE
Problem: PAIN - ADULT  Goal: Verbalizes/displays adequate comfort level or baseline comfort level  Description: Interventions:  - Encourage patient to monitor pain and request assistance  - Assess pain using appropriate pain scale  - Administer analgesics based on type and severity of pain and evaluate response  - Implement non-pharmacological measures as appropriate and evaluate response  - Consider cultural and social influences on pain and pain management  - Notify physician/advanced practitioner if interventions unsuccessful or patient reports new pain  Outcome: Progressing     Problem: INFECTION - ADULT  Goal: Absence or prevention of progression during hospitalization  Description: INTERVENTIONS:  - Assess and monitor for signs and symptoms of infection  - Monitor lab/diagnostic results  - Monitor all insertion sites, i.e. indwelling lines, tubes, and drains  - Monitor endotracheal if appropriate and nasal secretions for changes in amount and color  - [place] appropriate cooling/warming therapies per order  - Administer medications as ordered  - Instruct and encourage patient and family to use good hand hygiene technique  - Identify and instruct in appropriate isolation precautions for identified infection/condition  Outcome: Progressing     Problem: SAFETY ADULT  Goal: Patient will remain free of falls  Description: INTERVENTIONS:  - Educate patient/family on patient safety including physical limitations  - Instruct patient to call for assistance with activity   - Consult OT/PT to assist with strengthening/mobility   - Keep Call bell within reach  - Keep bed low and locked with side rails adjusted as appropriate  - Keep care items and personal belongings within reach  - Initiate and maintain comfort rounds  - Make Fall Risk Sign visible to staff  - Offer Toileting every 2 Hours, in advance of need  - Initiate/Maintain bed alarm  - Obtain necessary fall risk management equipment: socks   - Apply yellow  socks and bracelet for high fall risk patients  - Consider moving patient to room near nurses station  Outcome: Progressing  Goal: Maintain or return to baseline ADL function  Description: INTERVENTIONS:  -  Assess patient's ability to carry out ADLs; assess patient's baseline for ADL function and identify physical deficits which impact ability to perform ADLs (bathing, care of mouth/teeth, toileting, grooming, dressing, etc.)  - Assess/evaluate cause of self-care deficits   - Assess range of motion  - Assess patient's mobility; develop plan if impaired  - Assess patient's need for assistive devices and provide as appropriate  - Encourage maximum independence but intervene and supervise when necessary  - Involve family in performance of ADLs  - Assess for home care needs following discharge   - Consider OT consult to assist with ADL evaluation and planning for discharge  - Provide patient education as appropriate  Outcome: Progressing  Goal: Maintains/Returns to pre admission functional level  Description: INTERVENTIONS:  - Perform AM-PAC 6 Click Basic Mobility/ Daily Activity assessment daily.  - Set and communicate daily mobility goal to care team and patient/family/caregiver.   - Collaborate with rehabilitation services on mobility goals if consulted  - Perform Range of Motion 3 times a day.  - Reposition patient every 2 hours.  - Dangle patient 3 times a day  - Stand patient 3 times a day  - Ambulate patient 3 times a day  - Out of bed to chair 3 times a day   - Out of bed for meals 3 times a day  - Out of bed for toileting  - Record patient progress and toleration of activity level   Outcome: Progressing     Problem: DISCHARGE PLANNING  Goal: Discharge to home or other facility with appropriate resources  Description: INTERVENTIONS:  - Identify barriers to discharge w/patient and caregiver  - Arrange for needed discharge resources and transportation as appropriate  - Identify discharge learning needs (meds,  wound care, etc.)  - Arrange for interpretive services to assist at discharge as needed  - Refer to Case Management Department for coordinating discharge planning if the patient needs post-hospital services based on physician/advanced practitioner order or complex needs related to functional status, cognitive ability, or social support system  Outcome: Progressing     Problem: Knowledge Deficit  Goal: Patient/family/caregiver demonstrates understanding of disease process, treatment plan, medications, and discharge instructions  Description: Complete learning assessment and assess knowledge base.  Interventions:  - Provide teaching at level of understanding  - Provide teaching via preferred learning methods  Outcome: Progressing     Problem: Nutrition/Hydration-ADULT  Goal: Nutrient/Hydration intake appropriate for improving, restoring or maintaining nutritional needs  Description: Monitor and assess patient's nutrition/hydration status for malnutrition. Collaborate with interdisciplinary team and initiate plan and interventions as ordered.  Monitor patient's weight and dietary intake as ordered or per policy. Utilize nutrition screening tool and intervene as necessary. Determine patient's food preferences and provide high-protein, high-caloric foods as appropriate.     INTERVENTIONS:  - Monitor oral intake, urinary output, labs, and treatment plans  - Assess nutrition and hydration status and recommend course of action  - Evaluate amount of meals eaten  - Assist patient with eating if necessary   - Allow adequate time for meals  - Recommend/ encourage appropriate diets, oral nutritional supplements, and vitamin/mineral supplements  - Order, calculate, and assess calorie counts as needed  - Recommend, monitor, and adjust tube feedings and TPN/PPN based on assessed needs  - Assess need for intravenous fluids  - Provide specific nutrition/hydration education as appropriate  - Include patient/family/caregiver in  decisions related to nutrition  Outcome: Progressing     Problem: MOBILITY - ADULT  Goal: Maintain or return to baseline ADL function  Description: INTERVENTIONS:  -  Assess patient's ability to carry out ADLs; assess patient's baseline for ADL function and identify physical deficits which impact ability to perform ADLs (bathing, care of mouth/teeth, toileting, grooming, dressing, etc.)  - Assess/evaluate cause of self-care deficits   - Assess range of motion  - Assess patient's mobility; develop plan if impaired  - Assess patient's need for assistive devices and provide as appropriate  - Encourage maximum independence but intervene and supervise when necessary  - Involve family in performance of ADLs  - Assess for home care needs following discharge   - Consider OT consult to assist with ADL evaluation and planning for discharge  - Provide patient education as appropriate  Outcome: Progressing  Goal: Maintains/Returns to pre admission functional level  Description: INTERVENTIONS:  - Perform AM-PAC 6 Click Basic Mobility/ Daily Activity assessment daily.  - Set and communicate daily mobility goal to care team and patient/family/caregiver.   - Collaborate with rehabilitation services on mobility goals if consulted  - Perform Range of Motion 3 times a day.  - Reposition patient every 2 hours.  - Dangle patient 3 times a day  - Stand patient 3 times a day  - Ambulate patient 3 times a day  - Out of bed to chair 3 times a day   - Out of bed for meals 3 times a day  - Out of bed for toileting  - Record patient progress and toleration of activity level   Outcome: Progressing     Problem: NEUROSENSORY - ADULT  Goal: Achieves stable or improved neurological status  Description: INTERVENTIONS  - Monitor and report changes in neurological status  - Monitor vital signs such as temperature, blood pressure, glucose, and any other labs ordered   - Initiate measures to prevent increased intracranial pressure  - Monitor for seizure  activity and implement precautions if appropriate      Outcome: Progressing  Goal: Remains free of injury related to seizures activity  Description: INTERVENTIONS  - Maintain airway, patient safety  and administer oxygen as ordered  - Monitor patient for seizure activity, document and report duration and description of seizure to physician/advanced practitioner  - If seizure occurs,  ensure patient safety during seizure  - Reorient patient post seizure  - Seizure pads on all 4 side rails  - Instruct patient/family to notify RN of any seizure activity including if an aura is experienced  - Instruct patient/family to call for assistance with activity based on nursing assessment  - Administer anti-seizure medications if ordered    Outcome: Progressing  Goal: Achieves maximal functionality and self care  Description: INTERVENTIONS  - Monitor swallowing and airway patency with patient fatigue and changes in neurological status  - Encourage and assist patient to increase activity and self care.   - Encourage visually impaired, hearing impaired and aphasic patients to use assistive/communication devices  Outcome: Progressing     Problem: CARDIOVASCULAR - ADULT  Goal: Maintains optimal cardiac output and hemodynamic stability  Description: INTERVENTIONS:  - Monitor I/O, vital signs and rhythm  - Monitor for S/S and trends of decreased cardiac output  - Administer and titrate ordered vasoactive medications to optimize hemodynamic stability  - Assess quality of pulses, skin color and temperature  - Assess for signs of decreased coronary artery perfusion  - Instruct patient to report change in severity of symptoms  Outcome: Progressing  Goal: Absence of cardiac dysrhythmias or at baseline rhythm  Description: INTERVENTIONS:  - Continuous cardiac monitoring, vital signs, obtain 12 lead EKG if ordered  - Administer antiarrhythmic and heart rate control medications as ordered  - Monitor electrolytes and administer replacement  therapy as ordered  Outcome: Progressing     Problem: RESPIRATORY - ADULT  Goal: Achieves optimal ventilation and oxygenation  Description: INTERVENTIONS:  - Assess for changes in respiratory status  - Assess for changes in mentation and behavior  - Position to facilitate oxygenation and minimize respiratory effort  - Oxygen administered by appropriate delivery if ordered  - Initiate smoking cessation education as indicated  - Encourage broncho-pulmonary hygiene including cough, deep breathe, Incentive Spirometry  - Assess the need for suctioning and aspirate as needed  - Assess and instruct to report SOB or any respiratory difficulty  - Respiratory Therapy support as indicated  Outcome: Progressing     Problem: METABOLIC, FLUID AND ELECTROLYTES - ADULT  Goal: Electrolytes maintained within normal limits  Description: INTERVENTIONS:  - Monitor labs and assess patient for signs and symptoms of electrolyte imbalances  - Administer electrolyte replacement as ordered  - Monitor response to electrolyte replacements, including repeat lab results as appropriate  - Instruct patient on fluid and nutrition as appropriate  Outcome: Progressing  Goal: Fluid balance maintained  Description: INTERVENTIONS:  - Monitor labs   - Monitor I/O and WT  - Instruct patient on fluid and nutrition as appropriate  - Assess for signs & symptoms of volume excess or deficit  Outcome: Progressing  Goal: Glucose maintained within target range  Description: INTERVENTIONS:  - Monitor Blood Glucose as ordered  - Assess for signs and symptoms of hyperglycemia and hypoglycemia  - Administer ordered medications to maintain glucose within target range  - Assess nutritional intake and initiate nutrition service referral as needed  Outcome: Progressing     Problem: SKIN/TISSUE INTEGRITY - ADULT  Goal: Skin Integrity remains intact(Skin Breakdown Prevention)  Description: Assess:  -Perform [place] assessment every shift  -Clean and moisturize skin every  day  -Inspect skin when repositioning, toileting, and assisting with ADLS  -Assess extremities for adequate circulation and sensation     Bed Management:  -Have minimal linens on bed & keep smooth, unwrinkled  -Change linens as needed when moist or perspiring  -Avoid sitting or lying in one position for more than 2 hours while in bed  -Keep HOB at 30 degrees     Toileting:  -Offer bedside commode  -Assess for incontinence every shift  -Use incontinent care products after each incontinent episode such as faom    Activity:  -Mobilize patient 3 times a day  -Encourage activity and walks on unit  -Encourage or provide ROM exercises   -Turn and reposition patient every 8 Hours  -Use appropriate equipment to lift or move patient in bed  -Instruct/ Assist with weight shifting every 2 when out of bed in chair  -Consider limitation of chair time 120 hour intervals    Skin Care:  -Avoid use of baby powder, tape, friction and shearing, hot water or constrictive clothing  -Relieve pressure over bony prominences using wedges  -Do not massage red bony areas    Next Steps:  -Teach patient strategies to minimize risks such as fall   -Consider consults to  interdisciplinary teams such as PT/OT  Outcome: Progressing  Goal: Incision(s), wounds(s) or drain site(s) healing without S/S of infection  Description: INTERVENTIONS  - Assess and document dressing, incision, wound bed, drain sites and surrounding tissue  - Provide patient and family education  - Perform skin care/dressing changes as ordered  Outcome: Progressing  Goal: Pressure injury heals and does not worsen  Description: Interventions:  - Implement low air loss mattress or specialty surface (Criteria met)  - Apply silicone foam dressing  - Perform passive or active ROM every shift   - Turn and reposition patient & offload bony prominences every 2 hours   - Utilize friction reducing device or surface for transfers   - Consider nutrition services referral as needed  Outcome:  Progressing     Problem: MUSCULOSKELETAL - ADULT  Goal: Maintain or return mobility to safest level of function  Description: INTERVENTIONS:  - Assess patient's ability to carry out ADLs; assess patient's baseline for ADL function and identify physical deficits which impact ability to perform ADLs (bathing, care of mouth/teeth, toileting, grooming, dressing, etc.)  - Assess/evaluate cause of self-care deficits   - Assess range of motion  - Assess patient's mobility  - Assess patient's need for assistive devices and provide as appropriate  - Encourage maximum independence but intervene and supervise when necessary  - Involve family in performance of ADLs  - Assess for home care needs following discharge   - Consider OT consult to assist with ADL evaluation and planning for discharge  - Provide patient education as appropriate  Outcome: Progressing  Goal: Maintain proper alignment of affected body part  Description: INTERVENTIONS:  - Support, maintain and protect limb and body alignment  - Provide patient/ family with appropriate education  Outcome: Progressing     Problem: Prexisting or High Potential for Compromised Skin Integrity  Goal: Skin integrity is maintained or improved  Description: INTERVENTIONS:  - Identify patients at risk for skin breakdown  - Assess and monitor skin integrity  - Assess and monitor nutrition and hydration status  - Monitor labs   - Assess for incontinence   - Turn and reposition patient  - Assist with mobility/ambulation  - Relieve pressure over bony prominences  - Avoid friction and shearing  - Provide appropriate hygiene as needed including keeping skin clean and dry  - Evaluate need for skin moisturizer/barrier cream  - Collaborate with interdisciplinary team   - Patient/family teaching  - Consider wound care consult   Outcome: Progressing

## 2024-02-18 NOTE — PROGRESS NOTES
[facility]  Progress Note  Name: Constanza Zhu I  MRN: [id number]  Unit/Bed#: -01 I Date of Admission: 2/7/2024   Date of Service: 2/18/2024 I Hospital Day: 9    Assessment/Plan   Generalized weakness  Assessment & Plan  Patient with a history of generalized weakness, prior admission discharged to [facility].  Patient arrives from home.  Typically walks with a walker  PT/OT- recommended rehab   CM placed referrals for rehab  Waiting for placement    Focal epilepsy (HCC)  Assessment & Plan  Patient with history of staring episodes, felt to be absence seizure's  Follows with neurology as outpatient  No acute events  continue Keppra 500 Mg    History of prediabetes  Assessment & Plan  Continue carb controlled diet and monitor sugars with BMP  Sugars remained stable    GERD (gastroesophageal reflux disease)  Assessment & Plan  Continue Protonix 20 mg    Primary hypertension  Assessment & Plan  Continue losartan and metoprolol  Blood pressure stable     History of stroke  Assessment & Plan  Patient with history of bilateral MCA strokes, with right stenosis and left stenosis with left ICA stent placement in May 2023  Baseline GCS 12, per EMS  GCS at time of admission 13, eye-opening to sound, confused, obeying commands  Follows with neurology outpatient  Per last neurology note BP goal of <130/80  Continue aspirin, Plavix, statin    * COVID  Assessment & Plan  Patient admitted in October 2023 for sepsis due to COVID, given IV remdesivir  Patient noted to have a fever of 101 at home today and was given 2 Tylenol,  Recurrent fever last evening of 100.6  Patient remains on room air  Completed remdesivir    AMS (altered mental status)-resolved as of 2/16/2024  Assessment & Plan  Patient with history of NPH, hypertension induced encephalopathy, recent diagnosis of COVID was found to have increased lethargy and confusion around 3 PM this afternoon when his wife came to check on him.   Baseline GCS is typically greater than 12 given prior history of multiple strokes.  GCS at time of admission 13, patient is only oriented to self  CT head and neck:  No changes from prior imaging studies, no intracranial hemorrhage or mass, stable old infarcts  Altered mental status likely in the setting of COVID infection, mental status returned to baseline  Continue to monitor neuro status  Currently at baseline     Hypokalemia-resolved as of 2/12/2024  Assessment & Plan  Patient with potassium of 3.2 on admission  Received 40 mill equivalents potassium in the ED  Hold home HTZ 12.5mg  Hypokalemia resolved             VTE Pharmacologic Prophylaxis: VTE Score: 8 High Risk (Score >/= 5) - Pharmacological DVT Prophylaxis Ordered: enoxaparin (Lovenox). Sequential Compression Devices Ordered.    Mobility:   Basic Mobility Inpatient Raw Score: 19  JH-HLM Goal: 6: Walk 10 steps or more  JH-HLM Achieved: 7: Walk 25 feet or more  HLM Goal NOT achieved. Continue with multidisciplinary rounding and encourage appropriate mobility to improve upon HLM goals.    Patient Centered Rounds: I performed bedside rounds with nursing staff today.   Discussions with Specialists or Other Care Team Provider: cm    Education and Discussions with Family / Patient: Updated  (wife) via phone.    Total Time Spent on Date of Encounter in care of patient: 30 mins. This time was spent on one or more of the following: performing physical exam; counseling and coordination of care; obtaining or reviewing history; documenting in the medical record; reviewing/ordering tests, medications or procedures; communicating with other healthcare professionals and discussing with patient's family/caregivers.    Current Length of Stay: 9 day(s)  Current Patient Status: Inpatient   Certification Statement:  waiting for rehab   Discharge Plan: Anticipate discharge tomorrow to rehab facility.    Code Status: Level 1 - Full Code    Subjective:     No  complaints. Has BM, eating better     Objective:     Vitals:   Temp (24hrs), Av.5 °F (36.9 °C), Min:98.3 °F (36.8 °C), Max:98.7 °F (37.1 °C)    Temp:  [98.3 °F (36.8 °C)-98.7 °F (37.1 °C)] 98.3 °F (36.8 °C)  HR:  [65-79] 65  Resp:  [16-22] 18  BP: (133-146)/(65-72) 133/69  SpO2:  [96 %-98 %] 97 %  Body mass index is 27.55 kg/m².     Input and Output Summary (last 24 hours):     Intake/Output Summary (Last 24 hours) at 2024 1526  Last data filed at 2024 1316  Gross per 24 hour   Intake 240 ml   Output 550 ml   Net -310 ml       Physical Exam:   Physical Exam  Vitals and nursing note reviewed.   Constitutional:       General: He is not in acute distress.     Appearance: He is not diaphoretic.   HENT:      Head: Normocephalic.   Eyes:      General: No scleral icterus.        Right eye: No discharge.         Left eye: No discharge.   Cardiovascular:      Rate and Rhythm: Normal rate and regular rhythm.      Heart sounds: No murmur heard.  Pulmonary:      Effort: Pulmonary effort is normal. No respiratory distress.      Breath sounds: Normal breath sounds. No wheezing, rhonchi or rales.   Abdominal:      General: There is no distension.      Palpations: Abdomen is soft.      Tenderness: There is no abdominal tenderness. There is no guarding or rebound.   Musculoskeletal:      Cervical back: Normal range of motion.      Right lower leg: No edema.      Left lower leg: No edema.   Skin:     General: Skin is warm.   Neurological:      Mental Status: He is alert.   Psychiatric:         Mood and Affect: Mood normal.         Behavior: Behavior normal.         Thought Content: Thought content normal.         Judgment: Judgment normal.          Additional Data:     Labs:  Results from last 7 days   Lab Units 24  0444   WBC Thousand/uL 10.30*   HEMOGLOBIN g/dL 12.2   HEMATOCRIT % 37.0   PLATELETS Thousands/uL 309     Results from last 7 days   Lab Units 24  0344   SODIUM mmol/L 140   POTASSIUM mmol/L 3.8    CHLORIDE mmol/L 108   CO2 mmol/L 26   BUN mg/dL 20   CREATININE mg/dL 0.79   ANION GAP mmol/L 6   CALCIUM mg/dL 8.9   GLUCOSE RANDOM mg/dL 87                       Lines/Drains:  Invasive Devices       Peripheral Intravenous Line  Duration             Peripheral IV 02/17/24 Dorsal (posterior);Right Forearm 1 day                          Imaging: No pertinent imaging reviewed.    Recent Cultures (last 7 days):         Last 24 Hours Medication List:   Current Facility-Administered Medications   Medication Dose Route Frequency Provider Last Rate    Artificial Tears  1 drop Both Eyes Q4H PRN Spenser Lopez MD      aspirin  81 mg Oral Daily Margarita Torres PA-C      atorvastatin  40 mg Oral Daily With Breakfast Margarita Torres PA-C      baclofen  5 mg Oral BID PRN Margarita Torres PA-C      donepezil  10 mg Oral Daily Margarita Torres PA-C      enoxaparin  40 mg Subcutaneous Daily Margarita Torres PA-C      levETIRAcetam  500 mg Oral Q12H JOSE Margarita Torres PA-C      losartan  50 mg Oral Daily Margarita Torres PA-C      melatonin  5 mg Oral HS Margarita Torres PA-C      metoprolol succinate  100 mg Oral Daily Margarita Torres PA-C      pantoprazole  20 mg Oral Early Morning Margarita Torres PA-C      polyethylene glycol  17 g Oral Daily Pilar Glaser MD      senna-docusate sodium  1 tablet Oral BID Pilar Glaser MD      tamsulosin  0.4 mg Oral Daily With Dinner Margarita Torres PA-C      ticagrelor  90 mg Oral Q12H JOSE Margarita Torres PA-C      traZODone  100 mg Oral HS Margarita Torres PA-C          Today, Patient Was Seen By: Pilar Glaser MD    **Please Note: This note may have been constructed using a voice recognition system.**

## 2024-02-19 VITALS
SYSTOLIC BLOOD PRESSURE: 150 MMHG | OXYGEN SATURATION: 98 % | BODY MASS INDEX: 27.49 KG/M2 | HEIGHT: 70 IN | HEART RATE: 72 BPM | TEMPERATURE: 98.1 F | DIASTOLIC BLOOD PRESSURE: 67 MMHG | WEIGHT: 192 LBS | RESPIRATION RATE: 20 BRPM

## 2024-02-19 PROCEDURE — 99232 SBSQ HOSP IP/OBS MODERATE 35: CPT | Performed by: INTERNAL MEDICINE

## 2024-02-19 RX ORDER — POLYETHYLENE GLYCOL 3350 17 G/17G
17 POWDER, FOR SOLUTION ORAL DAILY
Qty: 30 EACH | Refills: 0 | Status: SHIPPED | OUTPATIENT
Start: 2024-02-19

## 2024-02-19 RX ORDER — PANTOPRAZOLE SODIUM 40 MG/1
40 TABLET, DELAYED RELEASE ORAL DAILY
Qty: 90 TABLET | Refills: 0 | Status: SHIPPED | OUTPATIENT
Start: 2024-02-19

## 2024-02-19 RX ADMIN — LEVETIRACETAM 500 MG: 500 TABLET, FILM COATED ORAL at 09:03

## 2024-02-19 RX ADMIN — ENOXAPARIN SODIUM 40 MG: 100 INJECTION SUBCUTANEOUS at 09:03

## 2024-02-19 RX ADMIN — POLYETHYLENE GLYCOL 3350 17 G: 17 POWDER, FOR SOLUTION ORAL at 09:03

## 2024-02-19 RX ADMIN — TAMSULOSIN HYDROCHLORIDE 0.4 MG: 0.4 CAPSULE ORAL at 16:57

## 2024-02-19 RX ADMIN — METOPROLOL SUCCINATE 100 MG: 50 TABLET, EXTENDED RELEASE ORAL at 09:03

## 2024-02-19 RX ADMIN — SENNOSIDES AND DOCUSATE SODIUM 1 TABLET: 8.6; 5 TABLET ORAL at 18:24

## 2024-02-19 RX ADMIN — DONEPEZIL HYDROCHLORIDE 10 MG: 5 TABLET ORAL at 09:03

## 2024-02-19 RX ADMIN — SENNOSIDES AND DOCUSATE SODIUM 1 TABLET: 8.6; 5 TABLET ORAL at 09:03

## 2024-02-19 RX ADMIN — PANTOPRAZOLE SODIUM 20 MG: 20 TABLET, DELAYED RELEASE ORAL at 05:03

## 2024-02-19 RX ADMIN — TICAGRELOR 90 MG: 90 TABLET ORAL at 09:03

## 2024-02-19 RX ADMIN — ATORVASTATIN CALCIUM 40 MG: 40 TABLET, FILM COATED ORAL at 09:03

## 2024-02-19 RX ADMIN — ASPIRIN 81 MG: 81 TABLET, COATED ORAL at 09:03

## 2024-02-19 RX ADMIN — LOSARTAN POTASSIUM 50 MG: 50 TABLET, FILM COATED ORAL at 09:03

## 2024-02-19 NOTE — CASE MANAGEMENT
Case Management Progress Note    Patient name Constanza Zhu  Location /-01 MRN [id number]  : 1960 Date 2024       LOS (days): 10  Geometric Mean LOS (GMLOS) (days): 5  Days to GMLOS:-5        OBJECTIVE:        Current admission status: Inpatient  Preferred Pharmacy:   [facility] - [address] [address]  Phone: [phone] Fax: [phone]    [facility] - [place], [place] - [facility]  [address] [facility] [address]  Phone: [phone] Fax: [phone]    Primary Care Provider: ERIC Calixto    Primary Insurance: BLUE CROSS  Secondary Insurance: MEDICARE    PROGRESS NOTE:    Continue to await authorization. Update provided to Gerri at [facility].

## 2024-02-19 NOTE — ASSESSMENT & PLAN NOTE
Patient admitted in October 2023 for sepsis due to COVID, given IV remdesivir  Initially presented with fever  Fever since resolved  Currently saturating adequately on room air  Continue conservative management

## 2024-02-19 NOTE — ASSESSMENT & PLAN NOTE
Patient with history of NPH, hypertension induced encephalopathy, recent diagnosis of COVID was found to have increased lethargy and confusion on arrival  Suspect likely secondary to COVID  Has since resolved  Mental status currently at baseline  Continue delirium precautions

## 2024-02-19 NOTE — CASE MANAGEMENT
Case Management Discharge Planning Note    Patient name Constanza Zuh  Location /-01 MRN [id number]  : 1960 Date 2024       Current Admission Date: 2024  Current Admission Diagnosis:COVID   Patient Active Problem List    Diagnosis Date Noted    Generalized weakness 2024    COVID 2024    Internal carotid artery stent present 2024    Vision problem 2024    Dry eyes 2024    Focal epilepsy (HCC) 2023    Claudication of both lower extremities ([facility]) 2023    Type 2 diabetes mellitus with other diabetic ophthalmic complication ([facility]) 2023    Aphasia 2023    Atherosclerosis of native arteries of extremities with intermittent claudication, bilateral legs ([facility]) 2023    Benign prostatic hyperplasia with lower urinary tract symptoms 2023    Possible NPH (normal pressure hydrocephalus) ([facility]) 2023    Muscle spasms of both lower extremities 2023    Multiple thyroid nodules 2023    Abnormal laboratory test 05/15/2023    History of tobacco use 2023    Chronic ischemic left MCA stroke 2023    Infrarenal abdominal aortic aneurysm (AAA) without rupture ([facility]) 2023    Tachycardia 2023    Prediabetes 2023    SIRS (systemic inflammatory response syndrome) ([facility]) 2023    Chronic anticoagulation 2023    Recurrent strokes (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) 2019    Urinary  retention 03/27/2019    At risk for venous thromboembolism (VTE) 03/26/2019    Hypertriglyceridemia 03/26/2019    Nicotine dependence 03/26/2019    Carotid stenosis, bilateral 03/26/2019    Presbyopia 03/26/2019    History of stroke 03/19/2019    Headache 03/19/2019    Primary hypertension 03/19/2019    GERD (gastroesophageal reflux disease) 03/19/2019      LOS (days): 10  Geometric Mean LOS (GMLOS) (days): 5  Days to GMLOS:-5.2     OBJECTIVE:  Risk of Unplanned Readmission Score: 25.66         Current admission status: Inpatient   Preferred Pharmacy:   [facility] - [facility] [address]  [address] [address]  Phone: [phone] Fax: [phone]    [facility] - [place], [place] - [facility] [facility]  [address] [address]  Phone: [phone] Fax: [phone]    Primary Care Provider: ERIC Calixto    Primary Insurance: BLUE CROSS  Secondary Insurance: MEDICARE    DISCHARGE DETAILS:      Treatment Team Recommendation: Short Term Rehab  Discharge Destination Plan:: Short Term Rehab  Transport at Discharge : Wheelchair van  Dispatcher Contacted: Yes  Number/Name of Dispatcher: Samira     ETA of Transport (Date): 02/19/24  ETA of Transport (Time): 1830     Transfer Mode: Wheelchair        IMM Given (Date):: 02/19/24  IMM Given to:: Family  Family notified:: Faiza (wife)  IMM reviewed with patient's caregiver, patient's caregiver agrees with discharge determination.    Additional Comments: QUAN was informed by DC support that authorization for SNF was approved. QUAN provided [facility] with authorization. QUAN spoke with pt's wife Faiza to discuss WC Van transport; she is agreeable. QUAN arranged with [facility] for a 6:30pm dc to [facility]. QUAN notified pt, pt's bedside RN [facility], and pt's wife Faiza of dc time. Facility transfer form completed.    Accepting Facility Name, City & State  : [facility]  Receiving Facility/Agency Phone Number: [phone]  Facility/Agency Fax Number: [phone]

## 2024-02-19 NOTE — PROGRESS NOTES
[facility]  Progress Note  Name: Constanza Zhu I  MRN: [id number]  Unit/Bed#: -01 I Date of Admission: 2/7/2024   Date of Service: 2/19/2024 I Hospital Day: 10    Assessment/Plan   * COVID  Assessment & Plan  Patient admitted in October 2023 for sepsis due to COVID, given IV remdesivir  Initially presented with fever  Fever since resolved  Currently saturating adequately on room air  Continue conservative management    Generalized weakness  Assessment & Plan  Patient with a history of generalized weakness, prior admission discharged to [facility].  Patient arrives from home.  PT/OT recommending rehab  Medically clear awaiting rehab placement    Focal epilepsy (HCC)  Assessment & Plan  Continue Keppra    GERD (gastroesophageal reflux disease)  Assessment & Plan  Continue Protonix    Primary hypertension  Assessment & Plan  Continue losartan and metoprolol  BP currently controlled    History of stroke  Assessment & Plan  Patient with history of bilateral MCA strokes, with right stenosis and left stenosis with left ICA stent placement in May 2023  Continue aspirin, Plavix, statin    AMS (altered mental status)-resolved as of 2/16/2024  Assessment & Plan  Patient with history of NPH, hypertension induced encephalopathy, recent diagnosis of COVID was found to have increased lethargy and confusion on arrival  Suspect likely secondary to COVID  Has since resolved  Mental status currently at baseline  Continue delirium precautions           VTE Pharmacologic Prophylaxis:   Pharmacologic: Enoxaparin (Lovenox)  Mechanical VTE Prophylaxis in Place: Yes    Patient Centered Rounds: I have performed bedside rounds with nursing staff today.    Discussions with Specialists or Other Care Team Provider: cm, nursing    Education and Discussions with Family / Patient: pt, wife    Time Spent for Care: 30 minutes.  More than 50% of total time spent on counseling and coordination of care as  described above.    Current Length of Stay: 10 day(s)    Current Patient Status: Inpatient   Certification Statement: The patient will continue to require additional inpatient hospital stay due to see below    Discharge Plan: Medically clear awaiting placement to rehab    Code Status: Level 1 - Full Code      Subjective:   Denies chest pain, shortness of breath, cough, fevers, chills    Objective:     Vitals:   Temp (24hrs), Av.2 °F (36.8 °C), Min:98 °F (36.7 °C), Max:98.3 °F (36.8 °C)    Temp:  [98 °F (36.7 °C)-98.3 °F (36.8 °C)] 98 °F (36.7 °C)  HR:  [59-69] 59  Resp:  [18-20] 20  BP: (131-133)/(69-74) 133/74  SpO2:  [95 %-97 %] 96 %  Body mass index is 27.55 kg/m².     Input and Output Summary (last 24 hours):       Intake/Output Summary (Last 24 hours) at 2024 1031  Last data filed at 2024 0501  Gross per 24 hour   Intake 480 ml   Output 875 ml   Net -395 ml       Physical Exam:     Physical Exam  Constitutional:       General: He is not in acute distress.     Appearance: He is well-developed. He is not diaphoretic.   HENT:      Head: Normocephalic and atraumatic.      Nose: Nose normal.      Mouth/Throat:      Pharynx: No oropharyngeal exudate.   Eyes:      General: No scleral icterus.     Conjunctiva/sclera: Conjunctivae normal.   Cardiovascular:      Rate and Rhythm: Normal rate and regular rhythm.      Heart sounds: Normal heart sounds. No murmur heard.     No friction rub. No gallop.   Pulmonary:      Effort: Pulmonary effort is normal. No respiratory distress.      Breath sounds: Normal breath sounds. No wheezing or rales.   Chest:      Chest wall: No tenderness.   Abdominal:      General: Bowel sounds are normal. There is no distension.      Palpations: Abdomen is soft.      Tenderness: There is no abdominal tenderness. There is no guarding.   Musculoskeletal:         General: No tenderness or deformity. Normal range of motion.      Cervical back: Normal range of motion and neck supple.    Skin:     General: Skin is warm and dry.      Findings: No erythema.   Neurological:      Mental Status: He is alert. Mental status is at baseline.       (  Additional Data:     Labs:    Results from last 7 days   Lab Units 02/18/24  0444   WBC Thousand/uL 10.30*   HEMOGLOBIN g/dL 12.2   HEMATOCRIT % 37.0   PLATELETS Thousands/uL 309     Results from last 7 days   Lab Units 02/17/24  0344   SODIUM mmol/L 140   POTASSIUM mmol/L 3.8   CHLORIDE mmol/L 108   CO2 mmol/L 26   BUN mg/dL 20   CREATININE mg/dL 0.79   ANION GAP mmol/L 6   CALCIUM mg/dL 8.9   GLUCOSE RANDOM mg/dL 87                           * I Have Reviewed All Lab Data Listed Above.  * Additional Pertinent Lab Tests Reviewed: All Labs Within Last 24 Hours Reviewed    Imaging:    Imaging Reports Reviewed Today Include: na  Imaging Personally Reviewed by Myself Includes:  na    Recent Cultures (last 7 days):           Last 24 Hours Medication List:   Current Facility-Administered Medications   Medication Dose Route Frequency Provider Last Rate    Artificial Tears  1 drop Both Eyes Q4H PRN Spenser Lopez MD      aspirin  81 mg Oral Daily Margarita Torres PA-C      atorvastatin  40 mg Oral Daily With Breakfast Margarita Torres PA-C      baclofen  5 mg Oral BID PRN Margarita Torres PA-C      donepezil  10 mg Oral Daily Margarita Torres PA-C      enoxaparin  40 mg Subcutaneous Daily Margarita Torres PA-C      levETIRAcetam  500 mg Oral Q12H JOSE Margarita Torres PA-C      losartan  50 mg Oral Daily Margarita Torres PA-C      melatonin  5 mg Oral HS Margarita Torres PA-C      metoprolol succinate  100 mg Oral Daily Margarita Torres PA-C      pantoprazole  20 mg Oral Early Morning Margarita Torres PA-C      polyethylene glycol  17 g Oral Daily Pilar Glaser MD      senna-docusate sodium  1 tablet Oral BID Pilar Glaser MD      tamsulosin  0.4 mg Oral Daily With Dinner Margarita Torres PA-C      ticagrelor  90 mg Oral Q12H JOSE  Margarita Torres PA-C      traZODone  100 mg Oral HS Margarita Torres PA-C          Today, Patient Was Seen By: Landen Pena MD    ** Please Note: Dictation voice to text software may have been used in the creation of this document. **

## 2024-02-19 NOTE — CASE MANAGEMENT
Case Management Discharge Planning Note    Patient name Constanza Zhu  Location /-01 MRN [id number]  : 1960 Date 2024       Current Admission Date: 2024  Current Admission Diagnosis:COVID   Patient Active Problem List    Diagnosis Date Noted    Generalized weakness 2024    COVID 2024    Internal carotid artery stent present 2024    Vision problem 2024    Dry eyes 2024    Focal epilepsy (HCC) 2023    Claudication of both lower extremities ([facility]) 2023    Type 2 diabetes mellitus with other diabetic ophthalmic complication ([facility]) 2023    Aphasia 2023    Atherosclerosis of native arteries of extremities with intermittent claudication, bilateral legs ([facility]) 2023    Benign prostatic hyperplasia with lower urinary tract symptoms 2023    Possible NPH (normal pressure hydrocephalus) ([facility]) 2023    Muscle spasms of both lower extremities 2023    Multiple thyroid nodules 2023    Abnormal laboratory test 05/15/2023    History of tobacco use 2023    Chronic ischemic left MCA stroke 2023    Infrarenal abdominal aortic aneurysm (AAA) without rupture ([facility]) 2023    Tachycardia 2023    Prediabetes 2023    SIRS (systemic inflammatory response syndrome) ([facility]) 2023    Chronic anticoagulation 2023    Recurrent strokes ([facility]) 2023    Hyponatremia 2023    Middle cerebral artery stenosis, right 2023    Left posterior MCA stroke - etiology unclear at this time 2023    Chronic low back pain 2023    Hypertensive encephalopathy, transient 2023    Snoring 08/10/2020    Memory deficits 08/10/2020    Status post placement of implantable loop recorder 2019    History of prediabetes 2019    Anxiety and depression 2019    Insomnia 2019    Fall 2019    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 ([facility]) 2019    Urinary  retention 03/27/2019    At risk for venous thromboembolism (VTE) 03/26/2019    Hypertriglyceridemia 03/26/2019    Nicotine dependence 03/26/2019    Carotid stenosis, bilateral 03/26/2019    Presbyopia 03/26/2019    History of stroke 03/19/2019    Headache 03/19/2019    Primary hypertension 03/19/2019    GERD (gastroesophageal reflux disease) 03/19/2019      LOS (days): 10  Geometric Mean LOS (GMLOS) (days): 5  Days to GMLOS:-5.1     OBJECTIVE:  Risk of Unplanned Readmission Score: 25.13         Current admission status: Inpatient   Preferred Pharmacy:   [facility] [address] [place] [address]  Phone: [phone] Fax: [phone]    [facility] - [address] [address]  Phone: [phone] Fax: [phone]    Primary Care Provider: ERIC Calixto    Primary Insurance: BLUE CROSS  Secondary Insurance: MEDICARE    DISCHARGE DETAILS:                                                                                                               Facility Insurance Auth Number: [id number]

## 2024-02-19 NOTE — CASE MANAGEMENT
Called into [employer] [place] to check status of auth spoke to Anabel who stated the auth is pending and is currently being over looked by a nurse. Cm notified

## 2024-02-19 NOTE — CASE MANAGEMENT
[facility] has received approved authorization from insurance:     Called in by Rep:Aminah VEGAS#  [phone] [id number] EX [id number]  Authorization received for: SNF  Facility: [facility]   Authorization #:[id number]  Start of Care:2/19  Next Review Date:2/23  Continued Stay Care Coordinator:  N/A  Submit next review to: fax [phone]   Care Manager notified:Tram Hutson

## 2024-02-19 NOTE — ASSESSMENT & PLAN NOTE
Patient with a history of generalized weakness, prior admission discharged to [facility].  Patient arrives from home.  PT/OT recommending rehab  Medically clear awaiting rehab placement

## 2024-02-19 NOTE — PLAN OF CARE
Problem: SAFETY ADULT  Goal: Patient will remain free of falls  Description: INTERVENTIONS:  - Educate patient/family on patient safety including physical limitations  - Instruct patient to call for assistance with activity   - Consult OT/PT to assist with strengthening/mobility   - Keep Call bell within reach  - Keep bed low and locked with side rails adjusted as appropriate  - Keep care items and personal belongings within reach  - Initiate and maintain comfort rounds  - Make Fall Risk Sign visible to staff  - Offer Toileting every 2 Hours, in advance of need  - Initiate/Maintain bed alarm  - Obtain necessary fall risk management equipment: socks   - Apply yellow socks and bracelet for high fall risk patients  - Consider moving patient to room near nurses station  Outcome: Progressing     Problem: SAFETY ADULT  Goal: Maintain or return to baseline ADL function  Description: INTERVENTIONS:  -  Assess patient's ability to carry out ADLs; assess patient's baseline for ADL function and identify physical deficits which impact ability to perform ADLs (bathing, care of mouth/teeth, toileting, grooming, dressing, etc.)  - Assess/evaluate cause of self-care deficits   - Assess range of motion  - Assess patient's mobility; develop plan if impaired  - Assess patient's need for assistive devices and provide as appropriate  - Encourage maximum independence but intervene and supervise when necessary  - Involve family in performance of ADLs  - Assess for home care needs following discharge   - Consider OT consult to assist with ADL evaluation and planning for discharge  - Provide patient education as appropriate  Outcome: Progressing     Problem: SAFETY ADULT  Goal: Maintains/Returns to pre admission functional level  Description: INTERVENTIONS:  - Perform AM-PAC 6 Click Basic Mobility/ Daily Activity assessment daily.  - Set and communicate daily mobility goal to care team and patient/family/caregiver.   - Collaborate with  rehabilitation services on mobility goals if consulted  - Perform Range of Motion 3 times a day.  - Reposition patient every 2 hours.  - Dangle patient 3 times a day  - Stand patient 3 times a day  - Ambulate patient 3 times a day  - Out of bed to chair 3 times a day   - Out of bed for meals 3 times a day  - Out of bed for toileting  - Record patient progress and toleration of activity level   Outcome: Progressing     Problem: PAIN - ADULT  Goal: Verbalizes/displays adequate comfort level or baseline comfort level  Description: Interventions:  - Encourage patient to monitor pain and request assistance  - Assess pain using appropriate pain scale  - Administer analgesics based on type and severity of pain and evaluate response  - Implement non-pharmacological measures as appropriate and evaluate response  - Consider cultural and social influences on pain and pain management  - Notify physician/advanced practitioner if interventions unsuccessful or patient reports new pain  Outcome: Progressing

## 2024-02-19 NOTE — ASSESSMENT & PLAN NOTE
Patient with history of bilateral MCA strokes, with right stenosis and left stenosis with left ICA stent placement in May 2023  Continue aspirin, Plavix, statin

## 2024-02-19 NOTE — PLAN OF CARE
Problem: Knowledge Deficit  Goal: Patient/family/caregiver demonstrates understanding of disease process, treatment plan, medications, and discharge instructions  Description: Complete learning assessment and assess knowledge base.  Interventions:  - Provide teaching at level of understanding  - Provide teaching via preferred learning methods  Outcome: Progressing     Problem: Nutrition/Hydration-ADULT  Goal: Nutrient/Hydration intake appropriate for improving, restoring or maintaining nutritional needs  Description: Monitor and assess patient's nutrition/hydration status for malnutrition. Collaborate with interdisciplinary team and initiate plan and interventions as ordered.  Monitor patient's weight and dietary intake as ordered or per policy. Utilize nutrition screening tool and intervene as necessary. Determine patient's food preferences and provide high-protein, high-caloric foods as appropriate.     INTERVENTIONS:  - Monitor oral intake, urinary output, labs, and treatment plans  - Assess nutrition and hydration status and recommend course of action  - Evaluate amount of meals eaten  - Assist patient with eating if necessary   - Allow adequate time for meals  - Recommend/ encourage appropriate diets, oral nutritional supplements, and vitamin/mineral supplements  - Order, calculate, and assess calorie counts as needed  - Recommend, monitor, and adjust tube feedings and TPN/PPN based on assessed needs  - Assess need for intravenous fluids  - Provide specific nutrition/hydration education as appropriate  - Include patient/family/caregiver in decisions related to nutrition  Outcome: Progressing

## 2024-02-19 NOTE — UTILIZATION REVIEW
Continued Stay Review    Date: 2/18/24 & 2/19/24                           Current Patient Class: inpatient   Current Level of Care: med surg    HPI:64 y.o. male initially admitted on   2/8/24  due to altered mental status/weakness   Patient with history of NPH, hypertension induced encephalopathy, recent diagnosis of COVID, CVA.  For COVID treated with Remdesivir. Baseline GCS is typically greater than 12 given prior history of multiple strokes.  has been denied acute rehab.       Assessment/Plan:   2/18/24:   better oral intake.   Exam unchanged.     Wbc 10.30.   awaiting auth for rehab at skilled nursing facility    2/19/24: forgetful and drowsy.   on exam: disoriented to time.  Follows commands. Expressive aphasia.  BLE strength 4/5.  Lungs diminished breath sounds. Continue PT/OT.  Needs rehab.     Vital Signs:   02/19/24 07:19:27 98 °F (36.7 °C) 59 20 133/74 94 96 % -- --   02/18/24 2050 98.2 °F (36.8 °C) 69 18 131/71 91 95 % -- --   02/18/24 15:06:34 98.3 °F (36.8 °C) 65 18 133/69 90 97 % -- --   02/18/24 1100 -- -- -- -- -- -- None (Room air) --   02/18/24 08:47:50 -- 78 16 146/72 97 96 % --      Pertinent Labs/Diagnostic Results:   CTA head and neck with and without contrast   Final Result by Denver Chinchilla MD (02/07 9733)      1.  No acute intracranial hemorrhage. Stable senescent changes and sequelae of multifocal old infarcts in the brain parenchyma detailed above. Paranasal sinus disease.   2.  Mild calcific atherosclerosis of the aortic arch region.  Moderate (50%) luminal narrowing of the proximal right subclavian artery due to partially calcified mixed plaque.   3.  Right vertebral artery dominance.  Moderate (50 to 70%) luminal stenosis involving the distal left vertebral artery V4 segment due to calcific atherosclerosis. Findings appear similar compared to the prior exam.   4.  Normal course and caliber of the right proximal to mid common carotid artery. Moderate (50%) luminal narrowing of the  distal right common carotid artery and proximal right internal carotid artery due to irregular mixed plaque. Remainder of the right    cervical internal carotid artery appear patent with normal course and caliber.   5.  The proximal to mid left common carotid artery is widely patent with normal course and caliber. Metallic stent in the distal left common carotid artery and proximal left internal carotid artery is again seen with moderate to severe circumferential    narrowing of the proximal internal carotid artery, similar compared to the prior study. This is likely related to intimal hyperplasia. Remainder of the left cervical internal carotid artery appear grossly patent with normal course and caliber. No    occlusion or dissection.   6.  Short segment of complete occlusion involving the distal right M1 segment is again seen, similar compared to the prior study with reconstitution of more distal branches via collateral vessels. Stable appearance of slightly diminished caliber of the    distal right MCA branch vessels although they remain patent. Left middle cerebral artery and proximal branches appear grossly patent with normal course and caliber.   7.  No enhancing brain mass/fluid collection or evidence of vascular malformation. No enhancing soft tissue neck mass/fluid collection or cervical lymphadenopathy.   8.  Stable nonspecific subcentimeter hypodense thyroid nodules requiring no imaging follow-up per current guidelines.      Workstation performed: [id number]         XR chest 1 view portable   ED Interpretation by Myriam Leger MD (02/07 1934)   No acute pulmonary pathology      Final Result by Jarrell Leon MD (02/08 0837)      No acute cardiopulmonary disease.            Workstation performed: [id number]            Results from last 7 days   Lab Units 02/18/24  0444 02/17/24  0344 02/16/24  0414 02/13/24  0456   WBC Thousand/uL 10.30* 10.29* 8.98 10.90*   HEMOGLOBIN g/dL 12.2 11.6* 11.9*  13.1   HEMATOCRIT % 37.0 35.4* 35.7* 39.2   PLATELETS Thousands/uL 309 319 321 254     Results from last 7 days   Lab Units 02/17/24 0344 02/16/24  0414 02/15/24  0256 02/14/24  0426 02/13/24  0456   SODIUM mmol/L 140 140 139 140 145   POTASSIUM mmol/L 3.8 3.4* 3.5 3.4* 3.5   CHLORIDE mmol/L 108 109* 108 107 107   CO2 mmol/L 26 26 24 26 27   ANION GAP mmol/L 6 5 7 7 11   BUN mg/dL 20 22 22 24 26*   CREATININE mg/dL 0.79 0.85 0.85 0.79 0.88   EGFR ml/min/1.73sq m 94 91 91 94 90   CALCIUM mg/dL 8.9 8.8 8.8 9.1 9.3     Results from last 7 days   Lab Units 02/17/24  0344 02/16/24  0414 02/15/24  0256 02/14/24  0426 02/13/24  0456   GLUCOSE RANDOM mg/dL 87 93 102 106 97         Medications:   Scheduled Medications:  aspirin, 81 mg, Oral, Daily  atorvastatin, 40 mg, Oral, Daily With Breakfast  donepezil, 10 mg, Oral, Daily  enoxaparin, 40 mg, Subcutaneous, Daily  levETIRAcetam, 500 mg, Oral, Q12H JOSE  losartan, 50 mg, Oral, Daily  melatonin, 5 mg, Oral, HS  metoprolol succinate, 100 mg, Oral, Daily  pantoprazole, 20 mg, Oral, Early Morning  polyethylene glycol, 17 g, Oral, Daily  senna-docusate sodium, 1 tablet, Oral, BID  tamsulosin, 0.4 mg, Oral, Daily With Dinner  ticagrelor, 90 mg, Oral, Q12H JOSE  traZODone, 100 mg, Oral, HS      Continuous IV Infusions:     PRN Meds: not used.   Artificial Tears, 1 drop, Both Eyes, Q4H PRN  baclofen, 5 mg, Oral, BID PRN        Discharge Plan: to be determined.     Network Utilization Review Department  ATTENTION: Please call with any questions or concerns to [phone] and carefully listen to the prompts so that you are directed to the right person. All voicemails are confidential.   For Discharge needs, contact Care Management DC Support Team at [phone] opt. 2  Send all requests for admission clinical reviews, approved or denied determinations and any other requests to dedicated fax number below belonging to the campus where the patient is receiving treatment. List of  dedicated fax numbers for the Facilities:  FACILITY NAME UR FAX NUMBER   ADMISSION DENIALS (Administrative/Medical Necessity) [phone]   DISCHARGE SUPPORT TEAM (NETWORK) [phone]   PARENT CHILD HEALTH (Maternity/NICU/Pediatrics) [phone]   [facility] [phone]   [facility] [phone]   [facility] [phone]   [facility] [phone]   [facility] [phone]   [facility] [phone]   [facility] [phone]   [facility] [phone]   [facility] [phone]   [facility] [phone]   [facility] [phone]   [facility] [phone]

## 2024-02-20 NOTE — UTILIZATION REVIEW
NOTIFICATION OF ADMISSION DISCHARGE   This is a Notification of Discharge from [facility]. Please be advised that this patient has been discharge from our facility. Below you will find the admission and discharge date and time including the patient’s disposition.   UTILIZATION REVIEW CONTACT:  Pilar Corea  Utilization   Network Utilization Review Department  Phone: [phone] x carefully listen to the prompts. All voicemails are confidential.  Email: [email or web address]     ADMISSION INFORMATION  PRESENTATION DATE: 2/7/2024  6:16 PM  OBERVATION ADMISSION DATE:   INPATIENT ADMISSION DATE: 2/9/24 12:47 PM   DISCHARGE DATE: 2/19/2024  7:55 PM   [facility]    Network Utilization Review Department  ATTENTION: Please call with any questions or concerns to [phone] and carefully listen to the prompts so that you are directed to the right person. All voicemails are confidential.   For Discharge needs, contact Care Management DC Support Team at [phone] opt. 2  Send all requests for admission clinical reviews, approved or denied determinations and any other requests to dedicated fax number below belonging to the campus where the patient is receiving treatment. List of dedicated fax numbers for the Facilities:  FACILITY NAME UR FAX NUMBER   ADMISSION DENIALS (Administrative/Medical Necessity) [phone]   DISCHARGE SUPPORT TEAM ([facility]) [phone]   PARENT CHILD HEALTH (Maternity/NICU/Pediatrics) [phone]   [facility] [phone]   [facility] [phone]   [facility] [phone]   [facility] [phone]   [facility] [phone]   [facility] [phone]   [facility] [phone]   [facility] [phone]    [facility] [phone]   [facility] [phone]   [facility] [phone]   [facility] [phone]

## 2024-02-22 NOTE — CASE MANAGEMENT
Case Management Progress Note    Patient name Constanza Zhu  Location /-01 MRN [id number]  : 1960 Date 2024       LOS (days): 10  Geometric Mean LOS (GMLOS) (days): 5  Days to GMLOS:-5.3        OBJECTIVE:        Current admission status: Inpatient  Preferred Pharmacy:   [facility] - [address] [address]  Phone: [phone] Fax: [phone]    [facility] - [place], [place] - [facility] [address] [address]  Phone: [phone] Fax: [phone]    Primary Care Provider: ERIC Calixto    Primary Insurance: BLUE CROSS  Secondary Insurance: MEDICARE    PROGRESS NOTE:    Notification made to OP CM Handoff: [facility] OP CM regarding discharge planning and disposition.   Message left for Eun CM in PCP office.

## 2024-02-28 NOTE — DISCHARGE SUMMARY
[facility]  Discharge- Constanza Zhu 1960, 64 y.o. male MRN: [id number]  Unit/Bed#: [facility]-01 Encounter: [id number]  Primary Care Provider: ERIC Calixto   Date and time admitted to hospital: 2/7/2024  6:16 PM  Discharging Physician / Practitioner: Landen Pena MD  PCP: ERIC Calixto  Admission Date:   Admission Orders (From admission, onward)       Ordered        02/09/24 1247  Inpatient Admission  Once            02/08/24 0004  Place in Observation  Once                          Discharge Date: 02/19/24    Medical Problems       Resolved Problems  Date Reviewed: 2/19/2024            Resolved    Hypokalemia 2/12/2024     Resolved by  Pilar Glaser MD    AMS (altered mental status) 2/16/2024     Resolved by  Pilar Glaser MD          Consultations During Hospital Stay:  none  Procedures Performed:   none    Significant Findings / Test Results:   XR chest 1 view portable    Result Date: 2/8/2024  Impression: No acute cardiopulmonary disease. Workstation performed: [id number]     CTA head and neck with and without contrast    Result Date: 2/7/2024  Impression: 1.  No acute intracranial hemorrhage. Stable senescent changes and sequelae of multifocal old infarcts in the brain parenchyma detailed above. Paranasal sinus disease. 2.  Mild calcific atherosclerosis of the aortic arch region.  Moderate (50%) luminal narrowing of the proximal right subclavian artery due to partially calcified mixed plaque. 3.  Right vertebral artery dominance.  Moderate (50 to 70%) luminal stenosis involving the distal left vertebral artery V4 segment due to calcific atherosclerosis. Findings appear similar compared to the prior exam. 4.  Normal course and caliber of the right proximal to mid common carotid artery. Moderate (50%) luminal narrowing of the distal right common carotid artery and proximal right internal carotid artery due to irregular mixed plaque. Remainder  of the right cervical internal carotid artery appear patent with normal course and caliber. 5.  The proximal to mid left common carotid artery is widely patent with normal course and caliber. Metallic stent in the distal left common carotid artery and proximal left internal carotid artery is again seen with moderate to severe circumferential narrowing of the proximal internal carotid artery, similar compared to the prior study. This is likely related to intimal hyperplasia. Remainder of the left cervical internal carotid artery appear grossly patent with normal course and caliber. No occlusion or dissection. 6.  Short segment of complete occlusion involving the distal right M1 segment is again seen, similar compared to the prior study with reconstitution of more distal branches via collateral vessels. Stable appearance of slightly diminished caliber of the distal right MCA branch vessels although they remain patent. Left middle cerebral artery and proximal branches appear grossly patent with normal course and caliber. 7.  No enhancing brain mass/fluid collection or evidence of vascular malformation. No enhancing soft tissue neck mass/fluid collection or cervical lymphadenopathy. 8.  Stable nonspecific subcentimeter hypodense thyroid nodules requiring no imaging follow-up per current guidelines. Workstation performed: [id number]      Incidental Findings:   none     Test Results Pending at Discharge (will require follow up):   none     Outpatient Tests Requested:  none    Complications:  none    Reason for Admission: [facility]    Hospital Course:     Constanza Zhu is a 64 y.o. male patient who originally presented to the hospital on 2/7/2024 d with past medical history significant of stroke, epilepsy on Keppra initially presented with generalized weakness secondary to COVID.  Never required oxygen.  Was saturating adequately on room air prior to discharge.  Ultimately discharged to rehab facility.  Will follow-up  outpatient with primary care doctor approximate 1 to 2 weeks    Please see above list of diagnoses and related plan for additional information.     Condition at Discharge: stable     Discharge Day Visit / Exam:     * Please refer to separate progress note for these details *    Discussion with Family: pt, wife    Discharge instructions/Information to patient and family:   See after visit summary for information provided to patient and family.      Provisions for Follow-Up Care:  See after visit summary for information related to follow-up care and any pertinent home health orders.      Disposition:     Acute Rehab at     For Discharges to [facility]:   Not Applicable to this Patient - Not Applicable to this Patient    Planned Readmission: none     Discharge Statement:  I spent 30 minutes discharging the patient. This time was spent on the day of discharge. I had direct contact with the patient on the day of discharge. Greater than 50% of the total time was spent examining patient, answering all patient questions, arranging and discussing plan of care with patient as well as directly providing post-discharge instructions.  Additional time then spent on discharge activities.    Discharge Medications:  See after visit summary for reconciled discharge medications provided to patient and family.      ** Please Note: This note has been constructed using a voice recognition system **

## 2024-04-04 NOTE — PROGRESS NOTES
MDT-LOOP RECORDER   CARELINK TRANSMISSION: LOOP RECORDER  PRESENTING RHYTHM NSR @ 68 BPM  BATTERY STATUS "OK " 8 DEVICE CLASSIFIED AF EPISODES, AVAILABLE STRIPS DEMONSTRATE NO ATRIAL FIBRILLATION  INSTEAD EGM'S SHOW ST W/ PACs AND PVCs   AF BURDEN IS 0%  AF BURDEN MAYBE OVERESTIMATED DUE TO INAPPROPRIATE CLASSIFICATION OF AF  SOME STRIPS MAY NOT BE INTERPRETABLE OR AVAILABLE, CANNOT DEFINITIVELY RULE OUT ATRIAL FIBRILLATION  NO PATIENT  ACTIVATED EPISODES  NORMAL DEVICE FUNCTION   DL negative

## 2024-04-11 ENCOUNTER — HOSPITAL ENCOUNTER (INPATIENT)
Facility: HOSPITAL | Age: 64
LOS: 4 days | Discharge: HOME HEALTH | DRG: 065 | End: 2024-04-16
Attending: EMERGENCY MEDICINE | Admitting: INTERNAL MEDICINE
Payer: COMMERCIAL

## 2024-04-11 DIAGNOSIS — I63.9 ACUTE CVA (CEREBROVASCULAR ACCIDENT) (HCC): ICD-10-CM

## 2024-04-11 DIAGNOSIS — R53.1 GENERALIZED WEAKNESS: Primary | ICD-10-CM

## 2024-04-11 DIAGNOSIS — R26.2 AMBULATORY DYSFUNCTION: ICD-10-CM

## 2024-04-11 PROCEDURE — 99285 EMERGENCY DEPT VISIT HI MDM: CPT

## 2024-04-11 PROCEDURE — 93005 ELECTROCARDIOGRAM TRACING: CPT

## 2024-04-11 PROCEDURE — 99285 EMERGENCY DEPT VISIT HI MDM: CPT | Performed by: EMERGENCY MEDICINE

## 2024-04-12 ENCOUNTER — APPOINTMENT (EMERGENCY)
Dept: CT IMAGING | Facility: HOSPITAL | Age: 64
DRG: 065 | End: 2024-04-12
Payer: COMMERCIAL

## 2024-04-12 LAB
ALBUMIN SERPL BCP-MCNC: 3.9 G/DL (ref 3.5–5)
ALP SERPL-CCNC: 92 U/L (ref 34–104)
ALT SERPL W P-5'-P-CCNC: 17 U/L (ref 7–52)
ANION GAP SERPL CALCULATED.3IONS-SCNC: 7 MMOL/L (ref 4–13)
ANION GAP SERPL CALCULATED.3IONS-SCNC: 8 MMOL/L (ref 4–13)
AST SERPL W P-5'-P-CCNC: 16 U/L (ref 13–39)
ATRIAL RATE: 74 BPM
BACTERIA UR QL AUTO: ABNORMAL /HPF
BASOPHILS # BLD AUTO: 0.05 THOUSANDS/ÂΜL (ref 0–0.1)
BASOPHILS # BLD AUTO: 0.06 THOUSANDS/ÂΜL (ref 0–0.1)
BASOPHILS NFR BLD AUTO: 1 % (ref 0–1)
BASOPHILS NFR BLD AUTO: 1 % (ref 0–1)
BILIRUB SERPL-MCNC: 0.61 MG/DL (ref 0.2–1)
BILIRUB UR QL STRIP: NEGATIVE
BUN SERPL-MCNC: 17 MG/DL (ref 5–25)
BUN SERPL-MCNC: 24 MG/DL (ref 5–25)
CALCIUM SERPL-MCNC: 8.7 MG/DL (ref 8.4–10.2)
CALCIUM SERPL-MCNC: 8.9 MG/DL (ref 8.4–10.2)
CHLORIDE SERPL-SCNC: 102 MMOL/L (ref 96–108)
CHLORIDE SERPL-SCNC: 106 MMOL/L (ref 96–108)
CLARITY UR: CLEAR
CO2 SERPL-SCNC: 25 MMOL/L (ref 21–32)
CO2 SERPL-SCNC: 26 MMOL/L (ref 21–32)
COLOR UR: YELLOW
CREAT SERPL-MCNC: 0.83 MG/DL (ref 0.6–1.3)
CREAT SERPL-MCNC: 0.96 MG/DL (ref 0.6–1.3)
EOSINOPHIL # BLD AUTO: 0.02 THOUSAND/ÂΜL (ref 0–0.61)
EOSINOPHIL # BLD AUTO: 0.04 THOUSAND/ÂΜL (ref 0–0.61)
EOSINOPHIL NFR BLD AUTO: 0 % (ref 0–6)
EOSINOPHIL NFR BLD AUTO: 1 % (ref 0–6)
ERYTHROCYTE [DISTWIDTH] IN BLOOD BY AUTOMATED COUNT: 13.1 % (ref 11.6–15.1)
ERYTHROCYTE [DISTWIDTH] IN BLOOD BY AUTOMATED COUNT: 13.3 % (ref 11.6–15.1)
GFR SERPL CREATININE-BSD FRML MDRD: 82 ML/MIN/1.73SQ M
GFR SERPL CREATININE-BSD FRML MDRD: 92 ML/MIN/1.73SQ M
GLUCOSE SERPL-MCNC: 93 MG/DL (ref 65–140)
GLUCOSE SERPL-MCNC: 95 MG/DL (ref 65–140)
GLUCOSE UR STRIP-MCNC: NEGATIVE MG/DL
HCT VFR BLD AUTO: 37.4 % (ref 36.5–49.3)
HCT VFR BLD AUTO: 38.8 % (ref 36.5–49.3)
HGB BLD-MCNC: 12.4 G/DL (ref 12–17)
HGB BLD-MCNC: 12.5 G/DL (ref 12–17)
HGB UR QL STRIP.AUTO: ABNORMAL
IMM GRANULOCYTES # BLD AUTO: 0.02 THOUSAND/UL (ref 0–0.2)
IMM GRANULOCYTES # BLD AUTO: 0.04 THOUSAND/UL (ref 0–0.2)
IMM GRANULOCYTES NFR BLD AUTO: 0 % (ref 0–2)
IMM GRANULOCYTES NFR BLD AUTO: 1 % (ref 0–2)
KETONES UR STRIP-MCNC: NEGATIVE MG/DL
LEUKOCYTE ESTERASE UR QL STRIP: NEGATIVE
LYMPHOCYTES # BLD AUTO: 0.7 THOUSANDS/ÂΜL (ref 0.6–4.47)
LYMPHOCYTES # BLD AUTO: 0.93 THOUSANDS/ÂΜL (ref 0.6–4.47)
LYMPHOCYTES NFR BLD AUTO: 10 % (ref 14–44)
LYMPHOCYTES NFR BLD AUTO: 18 % (ref 14–44)
MAGNESIUM SERPL-MCNC: 2 MG/DL (ref 1.9–2.7)
MCH RBC QN AUTO: 31.3 PG (ref 26.8–34.3)
MCH RBC QN AUTO: 31.7 PG (ref 26.8–34.3)
MCHC RBC AUTO-ENTMCNC: 32.2 G/DL (ref 31.4–37.4)
MCHC RBC AUTO-ENTMCNC: 33.2 G/DL (ref 31.4–37.4)
MCV RBC AUTO: 96 FL (ref 82–98)
MCV RBC AUTO: 97 FL (ref 82–98)
MONOCYTES # BLD AUTO: 0.84 THOUSAND/ÂΜL (ref 0.17–1.22)
MONOCYTES # BLD AUTO: 0.92 THOUSAND/ÂΜL (ref 0.17–1.22)
MONOCYTES NFR BLD AUTO: 13 % (ref 4–12)
MONOCYTES NFR BLD AUTO: 16 % (ref 4–12)
MUCOUS THREADS UR QL AUTO: ABNORMAL
NEUTROPHILS # BLD AUTO: 3.25 THOUSANDS/ÂΜL (ref 1.85–7.62)
NEUTROPHILS # BLD AUTO: 5.5 THOUSANDS/ÂΜL (ref 1.85–7.62)
NEUTS SEG NFR BLD AUTO: 64 % (ref 43–75)
NEUTS SEG NFR BLD AUTO: 75 % (ref 43–75)
NITRITE UR QL STRIP: NEGATIVE
NON-SQ EPI CELLS URNS QL MICRO: ABNORMAL /HPF
NRBC BLD AUTO-RTO: 0 /100 WBCS
NRBC BLD AUTO-RTO: 0 /100 WBCS
P AXIS: 35 DEGREES
PH UR STRIP.AUTO: 6 [PH]
PLATELET # BLD AUTO: 211 THOUSANDS/UL (ref 149–390)
PLATELET # BLD AUTO: 212 THOUSANDS/UL (ref 149–390)
PMV BLD AUTO: 9.7 FL (ref 8.9–12.7)
PMV BLD AUTO: 9.8 FL (ref 8.9–12.7)
POTASSIUM SERPL-SCNC: 3.4 MMOL/L (ref 3.5–5.3)
POTASSIUM SERPL-SCNC: 3.6 MMOL/L (ref 3.5–5.3)
PR INTERVAL: 158 MS
PROT SERPL-MCNC: 6.8 G/DL (ref 6.4–8.4)
PROT UR STRIP-MCNC: ABNORMAL MG/DL
QRS AXIS: 41 DEGREES
QRSD INTERVAL: 100 MS
QT INTERVAL: 374 MS
QTC INTERVAL: 415 MS
RBC # BLD AUTO: 3.91 MILLION/UL (ref 3.88–5.62)
RBC # BLD AUTO: 4 MILLION/UL (ref 3.88–5.62)
RBC #/AREA URNS AUTO: ABNORMAL /HPF
SODIUM SERPL-SCNC: 136 MMOL/L (ref 135–147)
SODIUM SERPL-SCNC: 138 MMOL/L (ref 135–147)
SP GR UR STRIP.AUTO: 1.01 (ref 1–1.03)
T WAVE AXIS: -21 DEGREES
UROBILINOGEN UR STRIP-ACNC: <2 MG/DL
VENTRICULAR RATE: 74 BPM
WBC # BLD AUTO: 5.13 THOUSAND/UL (ref 4.31–10.16)
WBC # BLD AUTO: 7.24 THOUSAND/UL (ref 4.31–10.16)
WBC #/AREA URNS AUTO: ABNORMAL /HPF

## 2024-04-12 PROCEDURE — 85025 COMPLETE CBC W/AUTO DIFF WBC: CPT | Performed by: PHYSICIAN ASSISTANT

## 2024-04-12 PROCEDURE — 81001 URINALYSIS AUTO W/SCOPE: CPT | Performed by: PHYSICIAN ASSISTANT

## 2024-04-12 PROCEDURE — 97163 PT EVAL HIGH COMPLEX 45 MIN: CPT

## 2024-04-12 PROCEDURE — 97167 OT EVAL HIGH COMPLEX 60 MIN: CPT

## 2024-04-12 PROCEDURE — 80048 BASIC METABOLIC PNL TOTAL CA: CPT | Performed by: PHYSICIAN ASSISTANT

## 2024-04-12 PROCEDURE — 80053 COMPREHEN METABOLIC PANEL: CPT | Performed by: EMERGENCY MEDICINE

## 2024-04-12 PROCEDURE — 99222 1ST HOSP IP/OBS MODERATE 55: CPT | Performed by: INTERNAL MEDICINE

## 2024-04-12 PROCEDURE — 96360 HYDRATION IV INFUSION INIT: CPT

## 2024-04-12 PROCEDURE — 36415 COLL VENOUS BLD VENIPUNCTURE: CPT | Performed by: EMERGENCY MEDICINE

## 2024-04-12 PROCEDURE — 93010 ELECTROCARDIOGRAM REPORT: CPT | Performed by: INTERNAL MEDICINE

## 2024-04-12 PROCEDURE — 83735 ASSAY OF MAGNESIUM: CPT | Performed by: EMERGENCY MEDICINE

## 2024-04-12 PROCEDURE — 70498 CT ANGIOGRAPHY NECK: CPT

## 2024-04-12 PROCEDURE — 70496 CT ANGIOGRAPHY HEAD: CPT

## 2024-04-12 PROCEDURE — 85025 COMPLETE CBC W/AUTO DIFF WBC: CPT | Performed by: EMERGENCY MEDICINE

## 2024-04-12 RX ORDER — LEVETIRACETAM 500 MG/1
500 TABLET ORAL 2 TIMES DAILY
Status: DISCONTINUED | OUTPATIENT
Start: 2024-04-12 | End: 2024-04-16 | Stop reason: HOSPADM

## 2024-04-12 RX ORDER — TAMSULOSIN HYDROCHLORIDE 0.4 MG/1
0.4 CAPSULE ORAL
Status: DISCONTINUED | OUTPATIENT
Start: 2024-04-12 | End: 2024-04-16 | Stop reason: HOSPADM

## 2024-04-12 RX ORDER — LOSARTAN POTASSIUM 50 MG/1
50 TABLET ORAL DAILY
Status: DISCONTINUED | OUTPATIENT
Start: 2024-04-12 | End: 2024-04-16 | Stop reason: HOSPADM

## 2024-04-12 RX ORDER — ATORVASTATIN CALCIUM 40 MG/1
40 TABLET, FILM COATED ORAL
Status: DISCONTINUED | OUTPATIENT
Start: 2024-04-12 | End: 2024-04-16 | Stop reason: HOSPADM

## 2024-04-12 RX ORDER — PANTOPRAZOLE SODIUM 40 MG/1
40 TABLET, DELAYED RELEASE ORAL DAILY
Status: DISCONTINUED | OUTPATIENT
Start: 2024-04-12 | End: 2024-04-16 | Stop reason: HOSPADM

## 2024-04-12 RX ORDER — ENOXAPARIN SODIUM 100 MG/ML
40 INJECTION SUBCUTANEOUS DAILY
Status: DISCONTINUED | OUTPATIENT
Start: 2024-04-12 | End: 2024-04-16 | Stop reason: HOSPADM

## 2024-04-12 RX ORDER — DONEPEZIL HYDROCHLORIDE 5 MG/1
10 TABLET, FILM COATED ORAL DAILY
Status: DISCONTINUED | OUTPATIENT
Start: 2024-04-12 | End: 2024-04-16 | Stop reason: HOSPADM

## 2024-04-12 RX ORDER — METOPROLOL SUCCINATE 50 MG/1
100 TABLET, EXTENDED RELEASE ORAL DAILY
Status: DISCONTINUED | OUTPATIENT
Start: 2024-04-12 | End: 2024-04-16 | Stop reason: HOSPADM

## 2024-04-12 RX ORDER — TRAZODONE HYDROCHLORIDE 50 MG/1
100 TABLET ORAL
Status: DISCONTINUED | OUTPATIENT
Start: 2024-04-12 | End: 2024-04-16 | Stop reason: HOSPADM

## 2024-04-12 RX ORDER — LANOLIN ALCOHOL/MO/W.PET/CERES
6 CREAM (GRAM) TOPICAL
Status: DISCONTINUED | OUTPATIENT
Start: 2024-04-12 | End: 2024-04-16 | Stop reason: HOSPADM

## 2024-04-12 RX ORDER — MAGNESIUM HYDROXIDE/ALUMINUM HYDROXICE/SIMETHICONE 120; 1200; 1200 MG/30ML; MG/30ML; MG/30ML
30 SUSPENSION ORAL EVERY 6 HOURS PRN
Status: DISCONTINUED | OUTPATIENT
Start: 2024-04-12 | End: 2024-04-16 | Stop reason: HOSPADM

## 2024-04-12 RX ORDER — ACETAMINOPHEN 325 MG/1
650 TABLET ORAL EVERY 6 HOURS PRN
Status: DISCONTINUED | OUTPATIENT
Start: 2024-04-12 | End: 2024-04-16 | Stop reason: HOSPADM

## 2024-04-12 RX ORDER — BACLOFEN 10 MG/1
5 TABLET ORAL 2 TIMES DAILY PRN
Status: DISCONTINUED | OUTPATIENT
Start: 2024-04-12 | End: 2024-04-16 | Stop reason: HOSPADM

## 2024-04-12 RX ORDER — POTASSIUM CHLORIDE 20 MEQ/1
40 TABLET, EXTENDED RELEASE ORAL ONCE
Status: COMPLETED | OUTPATIENT
Start: 2024-04-12 | End: 2024-04-12

## 2024-04-12 RX ADMIN — TAMSULOSIN HYDROCHLORIDE 0.4 MG: 0.4 CAPSULE ORAL at 17:28

## 2024-04-12 RX ADMIN — LEVETIRACETAM 500 MG: 500 TABLET, FILM COATED ORAL at 10:24

## 2024-04-12 RX ADMIN — ENOXAPARIN SODIUM 40 MG: 100 INJECTION SUBCUTANEOUS at 10:23

## 2024-04-12 RX ADMIN — ASPIRIN 81 MG: 81 TABLET, COATED ORAL at 10:24

## 2024-04-12 RX ADMIN — TICAGRELOR 90 MG: 90 TABLET ORAL at 10:24

## 2024-04-12 RX ADMIN — LEVETIRACETAM 500 MG: 500 TABLET, FILM COATED ORAL at 21:13

## 2024-04-12 RX ADMIN — IOHEXOL 85 ML: 350 INJECTION, SOLUTION INTRAVENOUS at 02:12

## 2024-04-12 RX ADMIN — METOPROLOL SUCCINATE 100 MG: 50 TABLET, EXTENDED RELEASE ORAL at 10:24

## 2024-04-12 RX ADMIN — LOSARTAN POTASSIUM 50 MG: 50 TABLET, FILM COATED ORAL at 10:24

## 2024-04-12 RX ADMIN — POTASSIUM CHLORIDE 40 MEQ: 1500 TABLET, EXTENDED RELEASE ORAL at 10:24

## 2024-04-12 RX ADMIN — Medication 6 MG: at 21:13

## 2024-04-12 RX ADMIN — PANTOPRAZOLE SODIUM 40 MG: 40 TABLET, DELAYED RELEASE ORAL at 10:24

## 2024-04-12 RX ADMIN — ATORVASTATIN CALCIUM 40 MG: 40 TABLET, FILM COATED ORAL at 10:24

## 2024-04-12 RX ADMIN — SODIUM CHLORIDE 500 ML: 0.9 INJECTION, SOLUTION INTRAVENOUS at 00:07

## 2024-04-12 RX ADMIN — TICAGRELOR 90 MG: 90 TABLET ORAL at 21:13

## 2024-04-12 RX ADMIN — DONEPEZIL HYDROCHLORIDE 10 MG: 5 TABLET ORAL at 10:23

## 2024-04-12 RX ADMIN — TRAZODONE HYDROCHLORIDE 100 MG: 50 TABLET ORAL at 21:13

## 2024-04-12 NOTE — UTILIZATION REVIEW
NOTIFICATION OF INPATIENT ADMISSION   AUTHORIZATION REQUEST   SERVICING FACILITY:   [place] [address]  Tax ID: [id number]  NPI: [id number] ATTENDING PROVIDER:  Attending Name and NPI#: Pilar Glaser Md [[id number]]  Address: [address] [place] [address]  Phone: [phone]   ADMISSION INFORMATION:  Place of Service: Inpatient [facility]  Place of Service Code: 21  Inpatient Admission Date/Time: 4/12/24  3:27 AM  Discharge Date/Time: No discharge date for patient encounter.  Admitting Diagnosis Code/Description:  Weakness [R53.1]  Generalized weakness [R53.1]  Ambulatory dysfunction [R26.2]     UTILIZATION REVIEW CONTACT:  Pilar Corea Utilization   Network Utilization Review Department  Phone: [phone]  Fax [phone]  Email: [email or web address]  Contact for approvals/pending authorizations, clinical reviews, and discharge.     PHYSICIAN ADVISORY SERVICES:  Medical Necessity Denial & Hevi-gu-Wjwd Review  Phone: [phone]  Fax: [phone]  Email: [email or web address]     DISCHARGE SUPPORT TEAM:  For Patients Discharge Needs & Updates  Phone: [phone] opt. 2 Fax: [phone]  Email: [email or web address]

## 2024-04-12 NOTE — OCCUPATIONAL THERAPY NOTE
Occupational Therapy Evaluation     Patient Name: Constanza Zhu  Today's Date: 4/12/2024  Problem List  Principal Problem:    Generalized weakness  Active Problems:    Primary hypertension    Urinary retention    Recurrent strokes (HCC)    Focal epilepsy (HCC)    Past Medical History  Past Medical History:   Diagnosis Date    Depression     Diabetes mellitus (HCC)     patient denies    Dyslipidemia 03/26/2019    GERD (gastroesophageal reflux disease)     Hyperlipidemia     Hypertension     Prediabetes     Prediabetes 04/03/2019    Stroke (HCC)      Past Surgical History  Past Surgical History:   Procedure Laterality Date    ARTHROSCOPIC REPAIR ACL Left     BACK SURGERY      twice    CARPAL TUNNEL RELEASE Right     IR CEREBRAL ANGIOGRAPHY  05/04/2023    IR STROKE ALERT  03/19/2019    SHOULDER SURGERY Left     US GUIDED THYROID BIOPSY  11/21/2023 04/12/24 1235   OT Last Visit   OT Visit Date 04/12/24   Note Type   Note type Evaluation   Pain Assessment   Pain Assessment Tool 0-10   Pain Score No Pain   Restrictions/Precautions   Weight Bearing Precautions Per Order No   Other Precautions Cognitive;Fall Risk   Home Living   Type of Home House   Home Layout One level;Stairs to enter with rails  (1 CHRIS)   Bathroom Toilet Standard   Bathroom Accessibility Accessible   Home Equipment Walker;Wheelchair-manual   Additional Comments (S)  Pt expressed interested in BSC upon discharge   Prior Function   Level of Chambers Independent with functional mobility;Needs assistance with ADLs;Needs assistance with IADLS   Lives With Spouse   Receives Help From Family   IADLs Family/Friend/Other provides transportation;Family/Friend/Other provides medication management;Family/Friend/Other provides meals   Falls in the last 6 months   (none since DC from [facility] in Feb)   Vocational Retired   Comments Daughter staying with pt as wife is currently away on business, son is coming to stay with pt when daughter leaves    Lifestyle   Autonomy pta pt was (A) w ADLs and (A) w IADLs, (-) , caregivers 3x a week   Reciprocal Relationships spouse and family   Service to Others retired   Intrinsic Gratification pt did not state   General   Additional Pertinent History Hypertension, recurrent strokes, focal epilepsy, GERD, left posterior MCA stroke, SIRS, Tachycardia, type 2 diabetes   Family/Caregiver Present Yes   Additional General Comments Pt was pleasant   ADL   Where Assessed Edge of bed   Eating Assistance 5  Supervision/Setup   Grooming Assistance 4  Minimal Assistance   UB Bathing Assistance 4  Minimal Assistance   LB Bathing Assistance 3  Moderate Assistance   UB Dressing Assistance 4  Minimal Assistance   LB Dressing Assistance 3  Moderate Assistance   LB Dressing Deficit Don/doff R sock;Don/doff L sock;Don/doff R shoe;Don/doff L shoe   Toileting Assistance  2  Maximal Assistance   Bed Mobility   Supine to Sit 5  Supervision   Additional items Assist x 1   Transfers   Sit to Stand 5  Supervision   Additional items Assist x 1   Stand to Sit 5  Supervision   Additional items Assist x 1   Functional Mobility   Functional Mobility 5  Supervision   Additional Comments ax1, short household distance   Additional items Rolling walker   Balance   Static Sitting Good   Static Standing Fair +   Ambulatory Fair   Activity Tolerance   Activity Tolerance Patient limited by fatigue   Medical Staff Made Aware PT Zainab   RUE Assessment   RUE Assessment WFL   LUE Assessment   LUE Assessment [facility]   Vision-Basic Assessment   Patient Visual Report   (R visual cut from previous CVA)   Cognition   Overall Cognitive Status Impaired  (expressive Aphasia)   Arousal/Participation Alert;Cooperative   Attention Attends with cues to redirect   Orientation Level Oriented to person;Oriented to place;Oriented to situation   Memory Within functional limits   Following Commands Follows multistep commands without difficulty   Assessment   Limitation  Decreased ADL status;Decreased endurance;Decreased self-care trans;Decreased high-level ADLs;Decreased Safe judgement during ADL   Prognosis Good   Assessment Pt is a 64 y.o. male seen for OT evaluation s/p admission to [facility] on 4/11/2024 due to weakness. Diagnosed with Generalized weakness. Personal and env factors supporting pt at time of IE include supportive family, attitude towards recovery, and (A) with all ADLs. Personal and env factors inhibiting engagement in occupations include difficulty completing ADLs. Performance deficits that affect the pt’s occupational performance can be seen above. Due to pt's current functional limitations and medical complications pt is functioning below baseline. Pt would benefit from continued skilled OT treatment in order to maximize safety, independence and overall performance with ADLs, functional mobility, and functional transfers in order to achieve highest level of function.   Goals   Patient Goals feel better and go home   LTG Time Frame 3-7   Long Term Goal see below   Plan   Treatment Interventions ADL retraining;Functional transfer training;Endurance training;Cognitive reorientation;Equipment evaluation/education;Continued evaluation;Energy conservation;Activityengagement   Goal Expiration Date 04/19/24   OT Treatment Day 0   OT Frequency 2-3x/wk   Discharge Recommendation   Rehab Resource Intensity Level, OT III (Minimum Resource Intensity)   Additional Comments  The patient's raw score on the AM-PAC Daily Activity Inpatient Short Form is 16. A raw score of less than 19 suggests the patient may benefit from discharge to post-acute rehabilitation services. Please refer to the recommendation of the Occupational Therapist for safe discharge planning.  (Pt functioning near baseline, can return home with increased support)   AM-PAC Daily Activity Inpatient   Lower Body Dressing 2   Bathing 2   Toileting 2   Upper Body Dressing 3   Grooming 3   Eating 4   Daily Activity Raw  Score 16   Daily Activity Standardized Score (Calc for Raw Score >=11) 35.96   AM-PAC Applied Cognition Inpatient   Following a Speech/Presentation 3   Understanding Ordinary Conversation 3   Taking Medications 3   Remembering Where Things Are Placed or Put Away 3   Remembering List of 4-5 Errands 2   Taking Care of Complicated Tasks 2   Applied Cognition Raw Score 16   Applied Cognition Standardized Score 35.03   End of Consult   Education Provided Yes;Family or social support of family present for education by provider   Patient Position at End of Consult Bedside chair;All needs within reach   Nurse Communication Nurse aware of consult     GOALS    Pt will improve activity tolerance to G for min 30 min txment sessions for increase engagement in functional tasks    Pt will complete bed mobility at a Mod I level w/ G balance/safety demonstrated to decrease caregiver assistance required     Pt will complete UB dressing/self care w/ mod I using adaptive device and DME as needed     Pt will complete LB dressing/self care w/ mod A using adaptive device and DME as needed    Pt will improve functional transfers to Mod I on/off all surfaces using DME as needed w/ G balance/safety     Pt will improve functional mobility during ADL/IADL/leisure tasks to Mod I using DME as needed w/ G balance/safety     Pt will demonstrate G carryover of pt/caregiver education and training as appropriate w/o cues w/ good tolerance to increase safety during functional tasks    Pt will demonstrate 100% carryover of energy conservation techniques t/o functional I/ADL/leisure tasks w/o cues s/p skilled education to increase endurance during functional tasks      The patient's raw score on the AM-PAC Daily Activity Inpatient Short Form is 16. A raw score of less than 19 suggests the patient may benefit from discharge to post-acute rehabilitation services. Please refer to the recommendation of the Occupational Therapist for safe discharge  planning.    Deanne Acevedo, OTR/L

## 2024-04-12 NOTE — H&P
[facility]  H&P  Name: Constanza Zhu 64 y.o. male I MRN: [id number]  Unit/Bed#: Z2 H1 I Date of Admission: 4/11/2024   Date of Service: 4/12/2024 I Hospital Day: 0      Assessment/Plan   * Generalized weakness  Assessment & Plan  Presents due to generalized weakness following COVID vaccination yesterday  Normally ambulates with walker, however today, had difficulty getting into the wheelchair as well as getting up from the toilet  Also with chronic speech difficulties and left sided weakness which seem to be more pronounced today  CTA head/neck- No acute intracranial abnormality. Chronic ischemic changes. No acute large vessel occlusion or dissection. 70% stenosis proximal right cervical ICA. Chronic severe stenosis of the distal right M1 at site of previously seen chronic occlusion.  Reevaluate, if continued difficulty, Consider neurology consult.  Will hold off on stroke pathway for now.  PT/OT eval    Focal epilepsy (HCC)  Assessment & Plan  Continue Keppra    Recurrent strokes (HCC)  Assessment & Plan  History of recurrent strokes  Continue statin, aspirin and Brilinta    Urinary retention  Assessment & Plan  Continue Flomax    Primary hypertension  Assessment & Plan  Continue losartan metoprolol           VTE Pharmacologic Prophylaxis: VTE Score: 3 Moderate Risk (Score 3-4) - Pharmacological DVT Prophylaxis Ordered: enoxaparin (Lovenox).  Code Status: Prior level 1 full code    Anticipated Length of Stay: Patient will be admitted on an inpatient basis with an anticipated length of stay of greater than 2 midnights secondary to weakness.    Total Time Spent on Date of Encounter in care of patient: 45 mins. This time was spent on one or more of the following: performing physical exam; counseling and coordination of care; obtaining or reviewing history; documenting in the medical record; reviewing/ordering tests, medications or procedures; communicating with other healthcare  professionals and discussing with patient's family/caregivers.    Chief Complaint: Weakness    History of Present Illness:  Constanza Zhu is a 64 y.o. male with a PMH of significant recurrent CVAs with chronic left-sided weakness and speech difficulty, HTN, HLD, focal epilepsy, urinary retention who presents with generalized weakness.  Patient received COVID-vaccine yesterday, and today is significantly weak.  He typically ambulates with a walker, however had significant difficulty transferring into a wheelchair today as well as getting up from the toilet, prompting family to bring patient to the emergency department.  On arrival to the ED, he is hemodynamically stable.  Lab work is unremarkable.  CTA head/neck without any new findings.  Will be admitted for PT/OT evaluation, and if continues to have significant weakness possibly neurology evaluation.    Review of Systems:  Review of Systems   Constitutional:  Positive for fatigue. Negative for appetite change, chills and fever.   HENT:  Negative for ear pain, sore throat and trouble swallowing.    Eyes:  Negative for visual disturbance.   Respiratory:  Negative for cough, chest tightness, shortness of breath and wheezing.    Cardiovascular:  Negative for chest pain, palpitations and leg swelling.   Gastrointestinal:  Negative for abdominal distention, abdominal pain, diarrhea, nausea and vomiting.   Endocrine: Negative.    Genitourinary:  Negative for dysuria, flank pain and hematuria.   Musculoskeletal:  Negative for arthralgias, gait problem and myalgias.   Skin:  Negative for pallor.   Allergic/Immunologic: Negative for immunocompromised state.   Neurological:  Positive for weakness (Generalized). Negative for dizziness, syncope, light-headedness, numbness and headaches.       Past Medical and Surgical History:   Past Medical History:   Diagnosis Date    Depression     Diabetes mellitus (HCC)     patient denies    Dyslipidemia 03/26/2019    GERD  (gastroesophageal reflux disease)     Hyperlipidemia     Hypertension     Prediabetes     Prediabetes 04/03/2019    Stroke (HCC)        Past Surgical History:   Procedure Laterality Date    ARTHROSCOPIC REPAIR ACL Left     BACK SURGERY      twice    CARPAL TUNNEL RELEASE Right     IR CEREBRAL ANGIOGRAPHY  05/04/2023    IR STROKE ALERT  03/19/2019    SHOULDER SURGERY Left     US GUIDED THYROID BIOPSY  11/21/2023       Meds/Allergies:  Prior to Admission medications    Medication Sig Start Date End Date Taking? Authorizing Provider   aspirin (ECOTRIN LOW STRENGTH) 81 mg EC tablet Take 1 tablet (81 mg total) by mouth daily Do not start before April 19, 2023. 4/19/23   Aminah Piper PA-C   atorvastatin (LIPITOR) 40 mg tablet Take 40 mg by mouth daily with breakfast    Historical Provider, MD   baclofen 5 MG TABS Take 5 mg by mouth 2 (two) times a day as needed for muscle spasms 6/5/23   Alexis Silvestre MD   donepezil (ARICEPT) 10 mg tablet Take 1 tablet (10 mg total) by mouth in the morning 6/13/23   Maddie Steele MD   levETIRAcetam (Keppra) 500 mg tablet Take 1 tab (500 mg) twice a day for 1 week, then take 2 tabs (1000 mg) twice a day.  Patient taking differently: Take 500 mg by mouth 2 (two) times a day On recent neuro note, pt is 500mg bid 8/31/23   Mickey Brasher MD   losartan (COZAAR) 50 mg tablet Take 50 mg by mouth daily    Historical Provider, MD   MELATONIN GUMMIES PO Take 5 mg by mouth daily at bedtime    Historical Provider, MD   metoprolol succinate (TOPROL-XL) 100 mg 24 hr tablet Take 100 mg by mouth daily    Historical Provider, MD   pantoprazole (PROTONIX) 40 mg tablet Take 1 tablet (40 mg total) by mouth daily 2/19/24   Landen Pena MD   polyethylene glycol (MIRALAX) 17 g packet Take 17 g by mouth daily 2/19/24   Landen Pena MD   tamsulosin (FLOMAX) 0.4 mg Take 1 capsule (0.4 mg total) by mouth daily with dinner 11/15/23   Diana Isabel PA-C   ticagrelor (BRILINTA) 90 MG Take 1 tablet  (90 mg total) by mouth every 12 (twelve) hours 11/21/23   Maddie Steele MD   traZODone (DESYREL) 100 mg tablet Take 100 mg by mouth daily at bedtime 6/28/23   Historical Provider, MD REYES have reviewed home medications using recent Epic encounter.    Allergies:   Allergies   Allergen Reactions    Flexeril [Cyclobenzaprine] Drowsiness    Lisinopril Cough    Nsaids Confusion       Social History:  Marital Status: /Civil Union   Occupation: Noncontributory  Patient Pre-hospital Living Situation: Home  Patient Pre-hospital Level of Mobility: walks with walker  Patient Pre-hospital Diet Restrictions: None  Substance Use History:   Social History     Substance and Sexual Activity   Alcohol Use Never     Social History     Tobacco Use   Smoking Status Former   Smokeless Tobacco Never     Social History     Substance and Sexual Activity   Drug Use Never       Family History:  Family History   Problem Relation Age of Onset    Hyperlipidemia Mother     Hypertension Mother     Diabetes unspecified Mother         prediabetes    Heart attack Mother     Diabetes Mother     Hyperlipidemia Father     Hypertension Father     Prostate cancer Father     Stroke Father     Hyperlipidemia Sister     Hypertension Sister     Hyperlipidemia Sister     Hypertension Sister     Depression Sister     Hypertension Sister     Hyperlipidemia Sister     Depression Sister     Hyperlipidemia Brother     Hypertension Brother     Hypertension Brother     Prostate cancer Brother     Hypothyroidism Daughter     Hypothyroidism Son     Diabetes unspecified Son     Seizures Neg Hx        Physical Exam:     Vitals:   Blood Pressure: 118/56 (04/11/24 2257)  Pulse: 73 (04/11/24 2257)  Temperature: 98.1 °F (36.7 °C) (04/11/24 2257)  Temp Source: Temporal (04/11/24 2257)  Respirations: 18 (04/11/24 2257)  SpO2: 99 % (04/11/24 2257)    Physical Exam  Vitals and nursing note reviewed.   Constitutional:       Appearance: Normal appearance.   HENT:       Head: Normocephalic and atraumatic.      Mouth/Throat:      Mouth: Mucous membranes are moist.      Pharynx: Oropharynx is clear. No oropharyngeal exudate.   Eyes:      Extraocular Movements: Extraocular movements intact.   Cardiovascular:      Rate and Rhythm: Normal rate and regular rhythm.      Pulses: Normal pulses.      Heart sounds: Normal heart sounds. No murmur heard.     No friction rub. No gallop.   Pulmonary:      Effort: Pulmonary effort is normal. No respiratory distress.      Breath sounds: Normal breath sounds. No stridor. No wheezing or rales.   Abdominal:      General: Abdomen is flat. Bowel sounds are normal. There is no distension.      Palpations: Abdomen is soft.      Tenderness: There is no abdominal tenderness.   Musculoskeletal:      Right lower leg: No edema.      Left lower leg: No edema.   Skin:     General: Skin is warm and dry.   Neurological:      Mental Status: He is alert. Mental status is at baseline.          Additional Data:     Lab Results:  Results from last 7 days   Lab Units 04/12/24  0006   WBC Thousand/uL 7.24   HEMOGLOBIN g/dL 12.5   HEMATOCRIT % 38.8   PLATELETS Thousands/uL 212   SEGS PCT % 75   LYMPHO PCT % 10*   MONO PCT % 13*   EOS PCT % 0     Results from last 7 days   Lab Units 04/12/24  0006   SODIUM mmol/L 136   POTASSIUM mmol/L 3.4*   CHLORIDE mmol/L 102   CO2 mmol/L 26   BUN mg/dL 24   CREATININE mg/dL 0.96   ANION GAP mmol/L 8   CALCIUM mg/dL 8.9   ALBUMIN g/dL 3.9   TOTAL BILIRUBIN mg/dL 0.61   ALK PHOS U/L 92   ALT U/L 17   AST U/L 16   GLUCOSE RANDOM mg/dL 95                       Lines/Drains:  Invasive Devices       Peripheral Intravenous Line  Duration             Peripheral IV 04/11/24 Left Antecubital <1 day    Peripheral IV 04/12/24 Right Antecubital <1 day                        Imaging: Personally reviewed the following imaging: CTA head and neck  CTA head and neck with and without contrast   Final Result by Laci Day DO (04/12 0246)      No acute  intracranial abnormality. Chronic ischemic changes      No acute large vessel occlusion or dissection.      70% stenosis proximal right cervical ICA      Chronic severe stenosis of the distal right M1 at site of previously seen chronic occlusion.      Additional sites of atherosclerosis, as described.            Workstation performed: [employer]             EKG and Other Studies Reviewed on Admission:   EKG: NSR. HR 74.    ** Please Note: This note has been constructed using a voice recognition system. **

## 2024-04-12 NOTE — ASSESSMENT & PLAN NOTE
Presents due to generalized weakness following COVID vaccination yesterday  Normally ambulates with walker, however today, had difficulty getting into the wheelchair as well as getting up from the toilet  Also with chronic speech difficulties and left sided weakness which seem to be more pronounced today  CTA head/neck- No acute intracranial abnormality. Chronic ischemic changes. No acute large vessel occlusion or dissection. 70% stenosis proximal right cervical ICA. Chronic severe stenosis of the distal right M1 at site of previously seen chronic occlusion.  Reevaluate, if continued difficulty, Consider neurology consult.  Will hold off on stroke pathway for now.  PT/OT eval

## 2024-04-12 NOTE — ED PROVIDER NOTES
History  Chief Complaint   Patient presents with    Weakness - Generalized     Covid vaccine yesterday, lives at home, daughter had trouble getting pt off toilet.  Denies fever, chills, sob, chest pain.      64 year old male presents for evaluation of generalized weakness.  Patient reportedly had a COVID vaccination yesterday.  Similar symptoms with COVID infection 2 months ago for which he had been admitted to the hospital.  According to his daughter, patient is usually able to ambulate with a walker, but was struggling getting into his wheelchair and onto the toilet today.  Patient has chronic speech difficulties and left sided weakness, but this seemed more pronounced to her today.  She states he last seemed himself at 9 pm last night.          Prior to Admission Medications   Prescriptions Last Dose Informant Patient Reported? Taking?   MELATONIN GUMMIES PO  Spouse/Significant Other Yes No   Sig: Take 5 mg by mouth daily at bedtime   aspirin (ECOTRIN LOW STRENGTH) 81 mg EC tablet  Spouse/Significant Other No No   Sig: Take 1 tablet (81 mg total) by mouth daily Do not start before April 19, 2023.   atorvastatin (LIPITOR) 40 mg tablet  Spouse/Significant Other Yes No   Sig: Take 40 mg by mouth daily with breakfast   baclofen 5 MG TABS  Spouse/Significant Other No No   Sig: Take 5 mg by mouth 2 (two) times a day as needed for muscle spasms   donepezil (ARICEPT) 10 mg tablet  Spouse/Significant Other No No   Sig: Take 1 tablet (10 mg total) by mouth in the morning   levETIRAcetam (Keppra) 500 mg tablet  Spouse/Significant Other No No   Sig: Take 1 tab (500 mg) twice a day for 1 week, then take 2 tabs (1000 mg) twice a day.   Patient taking differently: Take 500 mg by mouth 2 (two) times a day On recent neuro note, pt is 500mg bid   losartan (COZAAR) 50 mg tablet  Spouse/Significant Other Yes No   Sig: Take 50 mg by mouth daily   metoprolol succinate (TOPROL-XL) 100 mg 24 hr tablet  Spouse/Significant Other Yes No    Sig: Take 100 mg by mouth daily   pantoprazole (PROTONIX) 40 mg tablet   No No   Sig: Take 1 tablet (40 mg total) by mouth daily   polyethylene glycol (MIRALAX) 17 g packet   No No   Sig: Take 17 g by mouth daily   tamsulosin (FLOMAX) 0.4 mg  Spouse/Significant Other No No   Sig: Take 1 capsule (0.4 mg total) by mouth daily with dinner   ticagrelor (BRILINTA) 90 MG  Spouse/Significant Other No No   Sig: Take 1 tablet (90 mg total) by mouth every 12 (twelve) hours   traZODone (DESYREL) 100 mg tablet  Spouse/Significant Other Yes No   Sig: Take 100 mg by mouth daily at bedtime      Facility-Administered Medications: None       Past Medical History:   Diagnosis Date    Depression     Diabetes mellitus (HCC)     patient denies    Dyslipidemia 03/26/2019    GERD (gastroesophageal reflux disease)     Hyperlipidemia     Hypertension     Prediabetes     Prediabetes 04/03/2019    Stroke (HCC)        Past Surgical History:   Procedure Laterality Date    ARTHROSCOPIC REPAIR ACL Left     BACK SURGERY      twice    CARPAL TUNNEL RELEASE Right     IR CEREBRAL ANGIOGRAPHY  05/04/2023    IR STROKE ALERT  03/19/2019    SHOULDER SURGERY Left     US GUIDED THYROID BIOPSY  11/21/2023       Family History   Problem Relation Age of Onset    Hyperlipidemia Mother     Hypertension Mother     Diabetes unspecified Mother         prediabetes    Heart attack Mother     Diabetes Mother     Hyperlipidemia Father     Hypertension Father     Prostate cancer Father     Stroke Father     Hyperlipidemia Sister     Hypertension Sister     Hyperlipidemia Sister     Hypertension Sister     Depression Sister     Hypertension Sister     Hyperlipidemia Sister     Depression Sister     Hyperlipidemia Brother     Hypertension Brother     Hypertension Brother     Prostate cancer Brother     Hypothyroidism Daughter     Hypothyroidism Son     Diabetes unspecified Son     Seizures Neg Hx      I have reviewed and agree with the history as  documented.    E-Cigarette/Vaping    E-Cigarette Use Never User      E-Cigarette/Vaping Substances    Nicotine No     THC No     CBD No     Flavoring No     Other No     Unknown No      Social History     Tobacco Use    Smoking status: Former    Smokeless tobacco: Never   Vaping Use    Vaping status: Never Used   Substance Use Topics    Alcohol use: Never    Drug use: Never       Review of Systems    Physical Exam  Physical Exam  Vitals and nursing note reviewed.   HENT:      Head: Normocephalic and atraumatic.   Cardiovascular:      Rate and Rhythm: Normal rate and regular rhythm.      Pulses: Normal pulses.   Pulmonary:      Effort: Pulmonary effort is normal. No respiratory distress.   Abdominal:      General: There is no distension.      Palpations: Abdomen is soft.      Tenderness: There is no abdominal tenderness.   Musculoskeletal:         General: No deformity.      Right lower leg: No edema.      Left lower leg: No edema.   Skin:     General: Skin is warm and dry.   Neurological:      Mental Status: He is alert. Mental status is at baseline.      Cranial Nerves: Dysarthria (stuttering speech) present. No facial asymmetry.      Sensory: Sensation is intact.      Coordination: Coordination is intact.      Comments: All 4 extremities without drift.  5/5 strength right upper extremity and bilateral lower extremities.  4+/5 strength left upper extremity.         Vital Signs  ED Triage Vitals [04/11/24 2257]   Temperature Pulse Respirations Blood Pressure SpO2   98.1 °F (36.7 °C) 73 18 118/56 99 %      Temp Source Heart Rate Source Patient Position - Orthostatic VS BP Location FiO2 (%)   Temporal Monitor -- Right arm --      Pain Score       No Pain           Vitals:    04/11/24 2257   BP: 118/56   Pulse: 73         Visual Acuity      ED Medications  Medications   sodium chloride 0.9 % bolus 500 mL (0 mL Intravenous Stopped 4/12/24 0122)   iohexol (OMNIPAQUE) 350 MG/ML injection (MULTI-DOSE) 85 mL (85 mL  Intravenous Given 4/12/24 0212)       Diagnostic Studies  Results Reviewed       Procedure Component Value Units Date/Time    Comprehensive metabolic panel [[id number]]  (Abnormal) Collected: 04/12/24 0006    Lab Status: Final result Specimen: Blood from Arm, Right Updated: 04/12/24 0041     Sodium 136 mmol/L      Potassium 3.4 mmol/L      Chloride 102 mmol/L      CO2 26 mmol/L      ANION GAP 8 mmol/L      BUN 24 mg/dL      Creatinine 0.96 mg/dL      Glucose 95 mg/dL      Calcium 8.9 mg/dL      AST 16 U/L      ALT 17 U/L      Alkaline Phosphatase 92 U/L      Total Protein 6.8 g/dL      Albumin 3.9 g/dL      Total Bilirubin 0.61 mg/dL      eGFR 82 ml/min/1.73sq m     Narrative:      National Kidney Disease Foundation guidelines for Chronic Kidney Disease (CKD):     Stage 1 with normal or high GFR (GFR > 90 mL/min/1.73 square meters)    Stage 2 Mild CKD (GFR = 60-89 mL/min/1.73 square meters)    Stage 3A Moderate CKD (GFR = 45-59 mL/min/1.73 square meters)    Stage 3B Moderate CKD (GFR = 30-44 mL/min/1.73 square meters)    Stage 4 Severe CKD (GFR = 15-29 mL/min/1.73 square meters)    Stage 5 End Stage CKD (GFR <15 mL/min/1.73 square meters)  Note: GFR calculation is accurate only with a steady state creatinine    Magnesium [[id number]]  (Normal) Collected: 04/12/24 0006    Lab Status: Final result Specimen: Blood from Arm, Right Updated: 04/12/24 0041     Magnesium 2.0 mg/dL     CBC and differential [[id number]]  (Abnormal) Collected: 04/12/24 0006    Lab Status: Final result Specimen: Blood from Arm, Right Updated: 04/12/24 0013     WBC 7.24 Thousand/uL      RBC 4.00 Million/uL      Hemoglobin 12.5 g/dL      Hematocrit 38.8 %      MCV 97 fL      MCH 31.3 pg      MCHC 32.2 g/dL      RDW 13.1 %      MPV 9.7 fL      Platelets 212 Thousands/uL      nRBC 0 /100 WBCs      Segmented % 75 %      Immature Grans % 1 %      Lymphocytes % 10 %      Monocytes % 13 %      Eosinophils Relative 0 %      Basophils Relative 1 %       Absolute Neutrophils 5.50 Thousands/µL      Absolute Immature Grans 0.04 Thousand/uL      Absolute Lymphocytes 0.70 Thousands/µL      Absolute Monocytes 0.92 Thousand/µL      Eosinophils Absolute 0.02 Thousand/µL      Basophils Absolute 0.06 Thousands/µL                    CTA head and neck with and without contrast   Final Result by Laci Day DO (04/12 0246)      No acute intracranial abnormality. Chronic ischemic changes      No acute large vessel occlusion or dissection.      70% stenosis proximal right cervical ICA      Chronic severe stenosis of the distal right M1 at site of previously seen chronic occlusion.      Additional sites of atherosclerosis, as described.            Workstation performed: [id number]                    Procedures  ECG 12 Lead Documentation Only    Date/Time: 4/12/2024 12:13 AM    Performed by: Myriam Leger MD  Authorized by: Myriam Leger MD    Indications / Diagnosis:  Generalized weakness  ECG reviewed by me, the ED Provider: yes    Patient location:  ED  Previous ECG:     Previous ECG:  Unavailable  Interpretation:     Interpretation: non-specific    Rate:     ECG rate:  74    ECG rate assessment: normal    Rhythm:     Rhythm: sinus rhythm    Ectopy:     Ectopy: none    QRS:     QRS axis:  Normal    QRS intervals:  Normal  Conduction:     Conduction: normal    ST segments:     ST segments:  Non-specific    Depression:  II, V4 and aVF  T waves:     T waves: inverted      Inverted:  III           ED Course                  Stroke Assessment       Row Name 04/12/24 0035             NIH Stroke Scale    Interval Baseline      Level of Consciousness (1a.) 0      LOC Questions (1b.) 0      LOC Commands (1c.) 0      Best Gaze (2.) 0      Visual (3.) 0      Facial Palsy (4.) 0      Motor Arm, Left (5a.) 0      Motor Arm, Right (5b.) 0      Motor Leg, Left (6a.) 0      Motor Leg, Right (6b.) 0      Limb Ataxia (7.) 0      Sensory (8.) 0      Best Language (9.) 0       Dysarthria (10.) 1      Extinction and Inattention (11.) (Formerly Neglect) 0      Total 1                    Flowsheet Row Most Recent Value   Thrombolytic Decision Options    Thrombolytic Decision Patient not a candidate.   Patient is not a candidate options Unclear time of onset outside appropriate time window.                                    Medical Decision Making  64 year old male presents for evaluation of generalized weakness and fatigue after covid vaccination yesterday.  Exacerbation of chronic stroke symptoms this afternoon with last normal at 9 pm yesterday.  EKG without STEMI criteria or arrhythmia on my independent interpretation.  Chronic findings on CTA.  Patient admitted for further evaluation and management.    Amount and/or Complexity of Data Reviewed  Labs: ordered.  Radiology: ordered.    Risk  Prescription drug management.  Decision regarding hospitalization.             Disposition  Final diagnoses:   Generalized weakness   Ambulatory dysfunction     Time reflects when diagnosis was documented in both MDM as applicable and the Disposition within this note       Time User Action Codes Description Comment    4/12/2024  3:03 AM Myriam Leger [R53.1] Generalized weakness     4/12/2024  3:03 AM Myriam Leger [R26.2] Ambulatory dysfunction           ED Disposition       ED Disposition   Admit    Condition   Stable    Date/Time   Fri Apr 12, 2024  3:27 AM    Comment   Case was discussed with NICHOLAS and the patient's admission status was agreed to be Admission Status: inpatient status to the service of Dr. Glaser .               Follow-up Information    None         Patient's Medications   Discharge Prescriptions    No medications on file       No discharge procedures on file.    PDMP Review         Value Time User    PDMP Reviewed  Yes 10/7/2023  9:00 AM Mariely Kingston PA-C            ED Provider  Electronically Signed by             Myriam Leger,  MD  04/12/24 0335

## 2024-04-12 NOTE — PLAN OF CARE
Problem: OCCUPATIONAL THERAPY ADULT  Goal: Performs self-care activities at highest level of function for planned discharge setting.  See evaluation for individualized goals.  Description: Treatment Interventions: ADL retraining, Functional transfer training, Endurance training, Cognitive reorientation, Equipment evaluation/education, Continued evaluation, Energy conservation, Activityengagement          See flowsheet documentation for full assessment, interventions and recommendations.   4/12/2024 1353 by Deanne Acevedo OT  Outcome: Progressing  Note: Limitation: Decreased ADL status, Decreased endurance, Decreased self-care trans, Decreased high-level ADLs, Decreased Safe judgement during ADL  Prognosis: Good  Assessment: Pt is a 64 y.o. male seen for OT evaluation s/p admission to [facility] on 4/11/2024 due to weakness. Diagnosed with Generalized weakness. Personal and env factors supporting pt at time of IE include supportive family, attitude towards recovery, and (A) with all ADLs. Personal and env factors inhibiting engagement in occupations include difficulty completing ADLs. Performance deficits that affect the pt’s occupational performance can be seen above. Due to pt's current functional limitations and medical complications pt is functioning below baseline. Pt would benefit from continued skilled OT treatment in order to maximize safety, independence and overall performance with ADLs, functional mobility, and functional transfers in order to achieve highest level of function.     Rehab Resource Intensity Level, OT: III (Minimum Resource Intensity)       4/12/2024 1353 by Deanne Acevedo OT  Outcome: Progressing  Note: Limitation: Decreased ADL status, Decreased endurance, Decreased self-care trans, Decreased high-level ADLs, Decreased Safe judgement during ADL  Prognosis: Good  Assessment: Pt is a 64 y.o. male seen for OT evaluation s/p admission to [facility] on 4/11/2024 due to weakness. Diagnosed with  Generalized weakness. Personal and env factors supporting pt at time of IE include supportive family, attitude towards recovery, and (A) with all ADLs. Personal and env factors inhibiting engagement in occupations include difficulty completing ADLs. Performance deficits that affect the pt’s occupational performance can be seen above. Due to pt's current functional limitations and medical complications pt is functioning below baseline. Pt would benefit from continued skilled OT treatment in order to maximize safety, independence and overall performance with ADLs, functional mobility, and functional transfers in order to achieve highest level of function.     Rehab Resource Intensity Level, OT: III (Minimum Resource Intensity)

## 2024-04-12 NOTE — PHYSICAL THERAPY NOTE
PHYSICAL THERAPY EVALUATION NOTE    Patient Name: Constanza Zhu  Today's Date: 2024    AGE:   64 y.o.  Mrn:   [id number]  ADMIT DX:  Weakness [R53.1]    Past Medical History:   Diagnosis Date    Depression     Diabetes mellitus (HCC)     patient denies    Dyslipidemia 2019    GERD (gastroesophageal reflux disease)     Hyperlipidemia     Hypertension     Prediabetes     Prediabetes 2019    Stroke (HCC)      Length Of Stay: 0  PHYSICAL THERAPY EVALUATION :   Patient's identity confirmed via 2 patient identifiers (full name and ) at start of session       24 1252   PT Last Visit   PT Visit Date 24   Note Type   Note type Evaluation   Pain Assessment   Pain Assessment Tool 0-10   Pain Score No Pain   Restrictions/Precautions   Weight Bearing Precautions Per Order No   Other Precautions Cognitive;Fall Risk   Home Living   Type of Home House   Home Layout One level;Stairs to enter with rails  (1 CHRIS)   Home Equipment Walker;Wheelchair-manual;Hospital bed   Prior Function   Level of [place] Independent with functional mobility;Needs assistance with ADLs   Lives With Spouse   Receives Help From Family   IADLs Family/Friend/Other provides medication management;Family/Friend/Other provides meals;Family/Friend/Other provides transportation   Falls in the last 6 months   (none new since DC from [facility] in February)   Vocational Retired  ()   Comments Constanza resides in a 1 SH w/ 1 CHRIS, uses RW to ambulate. He resides w/ his wife, but she is currently on a business trip so his daughter is staying with him. Son will be staying with him next week   General   Family/Caregiver Present Yes  (Daughter)   Cognition   Arousal/Participation Alert   Orientation Level Oriented to person;Oriented to situation;Oriented to place   Memory Within functional limits   Following Commands Follows multistep commands without  "difficulty   Comments Pt pleasant and agreeable, reports feeling overall tired. Has baseline expressive aphasia   Vision-Basic Assessment   Patient Visual Report   (R visual field cut from previous CVA)   Coordination   Movements are Fluid and Coordinated 0   Coordination and Movement Description mild LE ataxia, from previous hx of CVA   Bed Mobility   Supine to Sit 5  Supervision   Additional items Assist x 1   Transfers   Sit to Stand 5  Supervision   Additional items Assist x 1   Stand to Sit 5  Supervision   Additional items Assist x 1   Ambulation/Elevation   Gait pattern Decreased foot clearance;Narrow NEREIDA;Short stride   Gait Assistance 5  Supervision  (CGA --> S)   Assistive Device Rolling walker   Distance 40 ft   Ambulation/Elevation Additional Comments Constanza benefits from VC to \"take big steps\"   Balance   Static Sitting Good   Static Standing Fair +   Ambulatory Fair   Activity Tolerance   Activity Tolerance Patient limited by fatigue   Medical Staff Made Aware OT QUAN Wong   Assessment   Problem List Decreased strength;Decreased endurance;Impaired balance;Decreased mobility;Decreased cognition   Assessment Constanza Zhu is a 64 y.o. Male who presents to [facility] on 4/11/2024 from home w/ c/o generalized weakness s/p COVID shot and diagnosis of generalized weakness. Orders for PT eval and treat received. Pt presents w/ comorbidities of hx of CVA, recurrent strokes, focal epilepsy, GERD, HTN, memory deficits, anxiety and depression. At baseline, pt mobilizes S w/ RW, and reports 0 falls in the last 6 months. Upon evaluation, pt presents w/ the following deficits: weakness, impaired balance, and decreased endurance. Upon eval, pt requires supervision for bed mobility, supervision for transfers, and CGA --> CS for gait. Based on this PT evaluation today, patient's discharge recommendation is for Level III. During this admission, pt would benefit from continued skilled inpatient PT in the acute care " setting in order to address the abovementioned deficits to maximize function and mobility before DC from acute care.   Goals   Patient Goals to feel better   STG Expiration Date 04/22/24   Short Term Goal #1 Patient will: Perform all bed mobility tasks modified independent to improve pt's independence w/ repositioning for decrease risk of skin breakdown, Perform all transfers modified independent consistently from various height surfaces in order to improve I w/ engagement w/ real-world environments/situations, Ambulate at least 100 ft. with roller walker w/ supervision w/o LOB to facilitate return and engagement w/ previous living environment, and Navigate 1 stairs w/ supervision without handrail to either improve independence w/ entering home and/or so patient can fully access living areas in home   PT Treatment Day 0   Plan   Treatment/Interventions Functional transfer training;LE strengthening/ROM;Elevations;Therapeutic exercise;Endurance training;Patient/family training;Equipment eval/education;Gait training;Bed mobility;Cognitive reorientation   PT Frequency 2-3x/wk   Discharge Recommendation   Rehab Resource Intensity Level, PT III (Minimum Resource Intensity)   Additional Comments S/w patient and daughter at end of session. Patient already with significant mobility improvement. Reviewed DCR, to us they report being comfortable and preferring patient to return  home. Anticipate continued improvement w/ functional mobility and especially endurance. Patient also has consistent home support as Ax1   AM-PAC Basic Mobility Inpatient   Turning in Flat Bed Without Bedrails 3   Lying on Back to Sitting on Edge of Flat Bed Without Bedrails 3   Moving Bed to Chair 3   Standing Up From Chair Using Arms 3   Walk in Room 3   Climb 3-5 Stairs With Railing 2  (only needs to navigate 1 STSE)   Basic Mobility Inpatient Raw Score 17   Basic Mobility Standardized Score 39.67   [facility] Level Of Mobility   [facility]  Goal 5: Stand one or more mins   -HLM Achieved 7: Walk 25 feet or more   End of Consult   Patient Position at End of Consult Bedside chair;All needs within reach         The patient's AM-PAC Basic Mobility Inpatient Short Form Raw Score is 17, Standardized Score is 39.67. A standardized score greater than 38.32 (raw score of 16) suggests the patient may benefit from discharge to home which may not coincide with above PT recommendations. However please refer to therapist recommendation for discharge planning given other factors that may influence destination.      Pt would benefit from skilled inpatient PT during this admission in order to facilitate progress towards goals to maximize functional independence    Zainab Alvarado PT, DPT

## 2024-04-12 NOTE — PROGRESS NOTES
Patient:  LISSA MYERS    MRN:  [id number]    [place] Request ID:  [id number]    Level of care reserved:  Home Health Agency    Partner Reserved:  [facility], [place] [address] [phone]    Clinical needs requested:    Geography searched:  [phone]    Start of Service:    Request sent:  1:54pm EDT on 4/12/2024 by Jayla Finn    Partner reserved:  2:47pm EDT on 4/12/2024 by Jayla Finn    Choice list shared:  2:47pm EDT on 4/12/2024 by Jayla Finn

## 2024-04-12 NOTE — CASE MANAGEMENT
Case Management Assessment & Discharge Planning Note    Patient name Constanza Zhu  Location /-01 MRN [id number]  : 1960 Date 2024       Current Admission Date: 2024  Current Admission Diagnosis:Generalized weakness   Patient Active Problem List    Diagnosis Date Noted    Generalized weakness 2024    COVID 2024    Internal carotid artery stent present 2024    Vision problem 2024    Dry eyes 2024    Focal epilepsy ([facility]) 2023    Claudication of both lower extremities ([facility]) 2023    Type 2 diabetes mellitus with other diabetic ophthalmic complication ([facility]) 2023    Aphasia 2023    Atherosclerosis of native arteries of extremities with intermittent claudication, bilateral legs ([facility]) 2023    Benign prostatic hyperplasia with lower urinary tract symptoms 2023    Possible NPH (normal pressure hydrocephalus) ([facility]) 2023    Muscle spasms of both lower extremities 2023    Multiple thyroid nodules 2023    Abnormal laboratory test 05/15/2023    History of tobacco use 2023    Chronic ischemic left MCA stroke 2023    Infrarenal abdominal aortic aneurysm (AAA) without rupture ([facility]) 2023    Tachycardia 2023    Prediabetes 2023    SIRS (systemic inflammatory response syndrome) ([facility]) 2023    Chronic anticoagulation 2023    Recurrent strokes ([facility]) 2023    Hyponatremia 2023    Middle cerebral artery stenosis, right 2023    Left posterior MCA stroke - etiology unclear at this time 2023    Chronic low back pain 2023    Hypertensive encephalopathy, transient 2023    Snoring 08/10/2020    Memory deficits 08/10/2020    Status post placement of implantable loop recorder 2019    History of prediabetes 2019    Anxiety and depression 2019    Insomnia 2019    Fall 2019    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  (HCC) 03/28/2019    Urinary retention 03/27/2019    At risk for venous thromboembolism (VTE) 03/26/2019    Hypertriglyceridemia 03/26/2019    Nicotine dependence 03/26/2019    Carotid stenosis, bilateral 03/26/2019    Presbyopia 03/26/2019    History of stroke 03/19/2019    Headache 03/19/2019    Primary hypertension 03/19/2019    GERD (gastroesophageal reflux disease) 03/19/2019      LOS (days): 0  Geometric Mean LOS (GMLOS) (days): 2.6  Days to GMLOS:2.1     OBJECTIVE:    Risk of Unplanned Readmission Score: 20.41         Current admission status: Inpatient       Preferred Pharmacy:   [id number] - [facility] [address]  [address] [facility] [address]  Phone: [phone] Fax: [phone]    [facility] - [place], [place] - [facility] [facility]  [address] [facility] [address]  Phone: [phone] Fax: [phone]    Primary Care Provider: ERIC Calixto    Primary Insurance: BLUE CROSS  Secondary Insurance: MEDICARE    ASSESSMENT:  Active Health Care Proxies       KATJA MYERS Health Care Representative - Spouse   Primary Phone: [phone] (Mobile)  Home Phone: [phone]                 Advance Directives  Does patient have a Health Care POA?: Yes  Does patient have Advance Directives?: Yes  Advance Directives: Living will, Power of  for health care  Primary Contact: Wife         Readmission Root Cause  30 Day Readmission: No    Patient Information  Admitted from:: Home  Mental Status: Alert  During Assessment patient was accompanied by: Daughter  Assessment information provided by:: Daughter  Primary Caregiver: Family  Caregiver's Relationship to Patient:: Family Member  Support Systems: Spouse/significant other, Family members  County of Residence: [place]  What city do you live in?: [place]  Home entry access options. Select all that apply.: Stairs  Number of steps to enter home.: 1  Do the steps have  railings?: No  Type of Current Residence: 2 story home  Upon entering residence, is there a bedroom on the main floor (no further steps)?: Yes  Upon entering residence, is there a bathroom on the main floor (no further steps)?: Yes  Living Arrangements: Lives w/ Spouse/significant other  Is patient a ?: No    Activities of Daily Living Prior to Admission  Functional Status: Independent  Completes ADLs independently?: Yes  Ambulates independently?: Yes  Does patient use assisted devices?: Yes  Assisted Devices (DME) used: Walker, Shower Chair  Does patient currently own DME?: Yes  What DME does the patient currently own?: Shower Chair, Walker, Straight Cane  Does patient have a history of Outpatient Therapy (PT/OT)?: Yes  Does the patient have a history of Short-Term Rehab?: Yes  Does patient have a history of HHC?: Yes  Does patient currently have HHC?: Yes    Current Home Health Care  Type of Current Home Care Services: Home PT, Nurse visit, Home OT  Current Home Health Agency:: Other (please enter name in comment) ([facility])  Current Home Health Follow-Up Provider:: PCP    Patient Information Continued  Income Source: Pension/residential  Does patient have prescription coverage?: Yes  Does patient receive dialysis treatments?: No  Does patient have a history of substance abuse?: No  Does patient have a history of Mental Health Diagnosis?: No         Means of Transportation  Means of Transport to Appts:: Family transport      Social Determinants of Health (SDOH)      Flowsheet Row Most Recent Value   Housing Stability    In the last 12 months, was there a time when you were not able to pay the mortgage or rent on time? N   In the last 12 months, how many places have you lived? 1   In the last 12 months, was there a time when you did not have a steady place to sleep or slept in a shelter (including now)? N   Transportation Needs    In the past 12 months, has lack of transportation kept you from medical  appointments or from getting medications? no   In the past 12 months, has lack of transportation kept you from meetings, work, or from getting things needed for daily living? No   Food Insecurity    Within the past 12 months, you worried that your food would run out before you got the money to buy more. Never true   Within the past 12 months, the food you bought just didn't last and you didn't have money to get more. Never true   Utilities    In the past 12 months has the electric, gas, oil, or water company threatened to shut off services in your home? No            DISCHARGE DETAILS:    Discharge planning discussed with:: patient wife and dtr via phone  Freedom of Choice: Yes  Comments - Freedom of Choice: discussed dc planning and role of CM. Plan for [facility] to [facility] and home  CM contacted family/caregiver?: Yes  Were Treatment Team discharge recommendations reviewed with patient/caregiver?: Yes  Did patient/caregiver verbalize understanding of patient care needs?: Yes       Contacts  Patient Contacts: Margarita Zhu  Relationship to Patient:: Family  Contact Method: Phone  Phone Number: [phone]  Reason/Outcome: Emergency Contact, Discharge Planning, Referral    Requested Home Health Care         Is the patient interested in HH at discharge?: Yes  Home Health Discipline requested:: Nursing, Occupational Therapy, Physical Therapy  Home Health Agency Name:: [facility]  HHA External Referral Reason (only applicable if external HHA name selected): Patient has established relationship with provider  Home Health Follow-Up Provider:: PCP  Home Health Services Needed:: Gait/ADL Training, Strengthening/Theraputic Exercises to Improve Function, Evaluate Functional Status and Safety  Homebound Criteria Met:: Uses an Assist Device (i.e. cane, walker, etc), Requires the Assistance of Another Person for Safe Ambulation or to Leave the Home  Supporting Clincal Findings:: Limited Endurance, Fatigues Easliy in Short  Distances    DME Referral Provided  Referral made for DME?: No    Other Referral/Resources/Interventions Provided:  Interventions: HHC  Referral Comments: plan for pt to return home with [facility] services that are already in place            Discharge Destination Plan:: Home with Home Health Care  Transport at Discharge : Family                             IMM Given (Date):: 04/12/24  IMM Given to:: Family  Family notified:: Margarita Zhu  Additional Comments: Cm spoke with pts wife briefly and found out she is out of the country until the end of next week and pts son and dtr are managing his care needs. Cm spoke with pts Dtr Margarita and she reports pt resides with his wife in a [facility] with 1st floor set up an 1 chiara. Pt uses a rw but also owns a cane and sc. He has caregivers through [facility] 3x a week for 3 hours and he is current with [facility] for PT/OT/Sn. Pt has a hx of STR at [facility]. He has also participated in PT/OT Op before. Pts son will be takign over for the next week as dtr is returning to [place]. [facility] and family provides transportation

## 2024-04-12 NOTE — PLAN OF CARE
Problem: PHYSICAL THERAPY ADULT  Goal: Performs mobility at highest level of function for planned discharge setting.  See evaluation for individualized goals.  Description: Treatment/Interventions: Functional transfer training, LE strengthening/ROM, Elevations, Therapeutic exercise, Endurance training, Patient/family training, Equipment eval/education, Gait training, Bed mobility, Cognitive reorientation          See flowsheet documentation for full assessment, interventions and recommendations.  4/12/2024 1327 by Zainab Alvarado, PT  Note:    Problem List: Decreased strength, Decreased endurance, Impaired balance, Decreased mobility, Decreased cognition  Assessment: Constanza Zhu is a 64 y.o. Male who presents to [facility] on 4/11/2024 from home w/ c/o generalized weakness s/p COVID shot and diagnosis of generalized weakness. Orders for PT eval and treat received. Pt presents w/ comorbidities of hx of CVA, recurrent strokes, focal epilepsy, GERD, HTN, memory deficits, anxiety and depression. At baseline, pt mobilizes S w/ RW, and reports 0 falls in the last 6 months. Upon evaluation, pt presents w/ the following deficits: weakness, impaired balance, and decreased endurance. Upon eval, pt requires supervision for bed mobility, supervision for transfers, and CGA --> CS for gait. Based on this PT evaluation today, patient's discharge recommendation is for Level III. During this admission, pt would benefit from continued skilled inpatient PT in the acute care setting in order to address the abovementioned deficits to maximize function and mobility before DC from acute care.        Rehab Resource Intensity Level, PT: III (Minimum Resource Intensity)    See flowsheet documentation for full assessment.

## 2024-04-13 ENCOUNTER — APPOINTMENT (INPATIENT)
Dept: MRI IMAGING | Facility: HOSPITAL | Age: 64
DRG: 065 | End: 2024-04-13
Payer: COMMERCIAL

## 2024-04-13 PROBLEM — I63.9 ACUTE CVA (CEREBROVASCULAR ACCIDENT) (HCC): Status: ACTIVE | Noted: 2024-02-08

## 2024-04-13 LAB
ANION GAP SERPL CALCULATED.3IONS-SCNC: 8 MMOL/L (ref 4–13)
BUN SERPL-MCNC: 24 MG/DL (ref 5–25)
CALCIUM SERPL-MCNC: 8.5 MG/DL (ref 8.4–10.2)
CHLORIDE SERPL-SCNC: 109 MMOL/L (ref 96–108)
CO2 SERPL-SCNC: 22 MMOL/L (ref 21–32)
CREAT SERPL-MCNC: 0.96 MG/DL (ref 0.6–1.3)
GFR SERPL CREATININE-BSD FRML MDRD: 82 ML/MIN/1.73SQ M
GLUCOSE SERPL-MCNC: 105 MG/DL (ref 65–140)
POTASSIUM SERPL-SCNC: 3.7 MMOL/L (ref 3.5–5.3)
SODIUM SERPL-SCNC: 139 MMOL/L (ref 135–147)

## 2024-04-13 PROCEDURE — 99232 SBSQ HOSP IP/OBS MODERATE 35: CPT | Performed by: PHYSICIAN ASSISTANT

## 2024-04-13 PROCEDURE — 80048 BASIC METABOLIC PNL TOTAL CA: CPT | Performed by: INTERNAL MEDICINE

## 2024-04-13 PROCEDURE — 70551 MRI BRAIN STEM W/O DYE: CPT

## 2024-04-13 RX ADMIN — METOPROLOL SUCCINATE 100 MG: 50 TABLET, EXTENDED RELEASE ORAL at 08:14

## 2024-04-13 RX ADMIN — ASPIRIN 81 MG: 81 TABLET, COATED ORAL at 08:14

## 2024-04-13 RX ADMIN — TICAGRELOR 90 MG: 90 TABLET ORAL at 21:07

## 2024-04-13 RX ADMIN — ATORVASTATIN CALCIUM 40 MG: 40 TABLET, FILM COATED ORAL at 08:14

## 2024-04-13 RX ADMIN — TRAZODONE HYDROCHLORIDE 100 MG: 50 TABLET ORAL at 21:14

## 2024-04-13 RX ADMIN — Medication 6 MG: at 21:07

## 2024-04-13 RX ADMIN — ENOXAPARIN SODIUM 40 MG: 100 INJECTION SUBCUTANEOUS at 08:16

## 2024-04-13 RX ADMIN — LEVETIRACETAM 500 MG: 500 TABLET, FILM COATED ORAL at 08:14

## 2024-04-13 RX ADMIN — TICAGRELOR 90 MG: 90 TABLET ORAL at 08:14

## 2024-04-13 RX ADMIN — LEVETIRACETAM 500 MG: 500 TABLET, FILM COATED ORAL at 21:07

## 2024-04-13 RX ADMIN — DONEPEZIL HYDROCHLORIDE 10 MG: 5 TABLET ORAL at 08:14

## 2024-04-13 RX ADMIN — LOSARTAN POTASSIUM 50 MG: 50 TABLET, FILM COATED ORAL at 08:14

## 2024-04-13 RX ADMIN — TAMSULOSIN HYDROCHLORIDE 0.4 MG: 0.4 CAPSULE ORAL at 16:19

## 2024-04-13 RX ADMIN — PANTOPRAZOLE SODIUM 40 MG: 40 TABLET, DELAYED RELEASE ORAL at 08:14

## 2024-04-13 NOTE — PROGRESS NOTES
[facility]  Progress Note  Name: Constanza Zhu I  MRN: [id number]  Unit/Bed#: -01 I Date of Admission: 4/11/2024   Date of Service: 4/13/2024 I Hospital Day: 1    Assessment/Plan   * Generalized weakness  Assessment & Plan  Presents due to generalized weakness following COVID vaccination yesterday  Normally ambulates with walker, however today, had difficulty getting into the wheelchair as well as getting up from the toilet  Also with chronic speech difficulties and left sided weakness which seem to be more pronounced today  CTA head/neck- No acute intracranial abnormality. Chronic ischemic changes. No acute large vessel occlusion or dissection. 70% stenosis proximal right cervical ICA. Chronic severe stenosis of the distal right M1 at site of previously seen chronic occlusion.  MRI brain pending  PT/OT eval    Focal epilepsy (HCC)  Assessment & Plan  Continue Keppra    Recurrent strokes (HCC)  Assessment & Plan  History of recurrent strokes  Continue statin, aspirin and Brilinta    Urinary retention  Assessment & Plan  Continue Flomax    Primary hypertension  Assessment & Plan  Continue losartan metoprolol               VTE Pharmacologic Prophylaxis: VTE Score: 3 Moderate Risk (Score 3-4) - Pharmacological DVT Prophylaxis Ordered: enoxaparin (Lovenox).    Mobility:   Basic Mobility Inpatient Raw Score: 17  JH-HLM Goal: 5: Stand one or more mins  JH-HLM Achieved: 7: Walk 25 feet or more  JH-HLM Goal achieved. Continue to encourage appropriate mobility.    Patient Centered Rounds: I performed bedside rounds with nursing staff today.   Discussions with Specialists or Other Care Team Provider: None, will discuss with neurology    Education and Discussions with Family / Patient: Updated  (son and daughter in law) at bedside.    Total Time Spent on Date of Encounter in care of patient: 50 mins. This time was spent on one or more of the following: performing physical  exam; counseling and coordination of care; obtaining or reviewing history; documenting in the medical record; reviewing/ordering tests, medications or procedures; communicating with other healthcare professionals and discussing with patient's family/caregivers.    Current Length of Stay: 1 day(s)  Current Patient Status: Inpatient   Certification Statement: The patient will continue to require additional inpatient hospital stay due to neurology evaluation  Discharge Plan: Anticipate discharge in 24-48 hrs to home.    Code Status: Level 1 - Full Code    Subjective:   No acute events overnight.  Patient reports his speech seems to improved today, however had more difficulty getting out of bed.    Objective:     Vitals:   Temp (24hrs), Av.3 °F (36.8 °C), Min:98.1 °F (36.7 °C), Max:98.8 °F (37.1 °C)    Temp:  [98.1 °F (36.7 °C)-98.8 °F (37.1 °C)] 98.1 °F (36.7 °C)  HR:  [68-77] 68  Resp:  [18] 18  BP: (115-127)/(61-64) 115/64  SpO2:  [96 %-97 %] 96 %  There is no height or weight on file to calculate BMI.     Input and Output Summary (last 24 hours):     Intake/Output Summary (Last 24 hours) at 2024 1437  Last data filed at 2024 2213  Gross per 24 hour   Intake --   Output 550 ml   Net -550 ml       Physical Exam:   Physical Exam  Vitals and nursing note reviewed.   Constitutional:       Appearance: Normal appearance.   HENT:      Head: Normocephalic and atraumatic.      Mouth/Throat:      Mouth: Mucous membranes are moist.      Pharynx: Oropharynx is clear. No oropharyngeal exudate.   Eyes:      Extraocular Movements: Extraocular movements intact.   Cardiovascular:      Rate and Rhythm: Normal rate and regular rhythm.      Pulses: Normal pulses.      Heart sounds: Normal heart sounds. No murmur heard.     No friction rub. No gallop.   Pulmonary:      Effort: Pulmonary effort is normal. No respiratory distress.      Breath sounds: Normal breath sounds. No stridor. No wheezing or rales.   Abdominal:       General: Abdomen is flat. Bowel sounds are normal. There is no distension.      Palpations: Abdomen is soft.      Tenderness: There is no abdominal tenderness.   Musculoskeletal:      Right lower leg: No edema.      Left lower leg: No edema.   Skin:     General: Skin is warm and dry.   Neurological:      Mental Status: He is alert and oriented to person, place, and time.      Comments: Speech difficulties improving, noted to have difficulty transferring from bed today          Additional Data:     Labs:  Results from last 7 days   Lab Units 04/12/24  1040   WBC Thousand/uL 5.13   HEMOGLOBIN g/dL 12.4   HEMATOCRIT % 37.4   PLATELETS Thousands/uL 211   SEGS PCT % 64   LYMPHO PCT % 18   MONO PCT % 16*   EOS PCT % 1     Results from last 7 days   Lab Units 04/13/24  0447 04/12/24  1040 04/12/24  0006   SODIUM mmol/L 139   < > 136   POTASSIUM mmol/L 3.7   < > 3.4*   CHLORIDE mmol/L 109*   < > 102   CO2 mmol/L 22   < > 26   BUN mg/dL 24   < > 24   CREATININE mg/dL 0.96   < > 0.96   ANION GAP mmol/L 8   < > 8   CALCIUM mg/dL 8.5   < > 8.9   ALBUMIN g/dL  --   --  3.9   TOTAL BILIRUBIN mg/dL  --   --  0.61   ALK PHOS U/L  --   --  92   ALT U/L  --   --  17   AST U/L  --   --  16   GLUCOSE RANDOM mg/dL 105   < > 95    < > = values in this interval not displayed.                       Lines/Drains:  Invasive Devices       Peripheral Intravenous Line  Duration             Peripheral IV 04/12/24 Right Antecubital 1 day                          Imaging: Reviewed radiology reports from this admission including: MRI brain    Recent Cultures (last 7 days):         Last 24 Hours Medication List:   Current Facility-Administered Medications   Medication Dose Route Frequency Provider Last Rate    acetaminophen  650 mg Oral Q6H PRN Dakota Tejada PA-C      aluminum-magnesium hydroxide-simethicone  30 mL Oral Q6H PRN Dakota Tejada PA-C      aspirin  81 mg Oral Daily Dakota Tejada PA-C      atorvastatin  40 mg Oral Daily  With Breakfast Dakota Tejada PA-C      baclofen  5 mg Oral BID PRN Dakota Tejada PA-C      donepezil  10 mg Oral Daily Dakota Tejada PA-C      enoxaparin  40 mg Subcutaneous Daily Dakota Tejada PA-C      levETIRAcetam  500 mg Oral BID Dakota Tejada PA-C      losartan  50 mg Oral Daily Dakota Tejada PA-C      melatonin  6 mg Oral HS Dakota Tejada PA-C      metoprolol succinate  100 mg Oral Daily Dakota Tejada PA-C      pantoprazole  40 mg Oral Daily Dakota Tejada PA-C      tamsulosin  0.4 mg Oral Daily With Dinner Dakota Tejada PA-C      ticagrelor  90 mg Oral Q12H JOSE Dakota Tejada PA-C      traZODone  100 mg Oral HS Dakota Tejada PA-C          Today, Patient Was Seen By: Dakota Tejada PA-C    **Please Note: This note may have been constructed using a voice recognition system.**

## 2024-04-13 NOTE — PLAN OF CARE
Problem: Potential for Falls  Goal: Patient will remain free of falls  Description: INTERVENTIONS:  - Educate patient/family on patient safety including physical limitations  - Instruct patient to call for assistance with activity   - Consult OT/PT to assist with strengthening/mobility   - Keep Call bell within reach  - Keep bed low and locked with side rails adjusted as appropriate  - Keep care items and personal belongings within reach  - Initiate and maintain comfort rounds  - Make Fall Risk Sign visible to staff  - Offer Toileting every x Hours, in advance of need  - Initiate/Maintain xalarm  - Obtain necessary fall risk management equipment: [id number]  - Apply yellow socks and bracelet for high fall risk patients  - Consider moving patient to room near nurses station  Outcome: Progressing     Problem: Prexisting or High Potential for Compromised Skin Integrity  Goal: Skin integrity is maintained or improved  Description: INTERVENTIONS:  - Identify patients at risk for skin breakdown  - Assess and monitor skin integrity  - Assess and monitor nutrition and hydration status  - Monitor labs   - Assess for incontinence   - Turn and reposition patient  - Assist with mobility/ambulation  - Relieve pressure over bony prominences  - Avoid friction and shearing  - Provide appropriate hygiene as needed including keeping skin clean and dry  - Evaluate need for skin moisturizer/barrier cream  - Collaborate with interdisciplinary team   - Patient/family teaching  - Consider wound care consult   Outcome: Progressing

## 2024-04-13 NOTE — UTILIZATION REVIEW
Initial Clinical Review    Admission: Date/Time/Statement:   Admission Orders (From admission, onward)       Ordered        04/12/24 0327  INPATIENT ADMISSION  Once                          Orders Placed This Encounter   Procedures    INPATIENT ADMISSION     Standing Status:   Standing     Number of Occurrences:   1     Order Specific Question:   Level of Care     Answer:   Med Surg [16]     Order Specific Question:   Estimated length of stay     Answer:   More than 2 Midnights     Order Specific Question:   Certification     Answer:   I certify that inpatient services are medically necessary for this patient for a duration of greater than two midnights. See H&P and MD Progress Notes for additional information about the patient's course of treatment.     ED Arrival Information       Expected   -    Arrival   4/11/2024 22:52    Acuity   Urgent              Means of arrival   Ambulance    Escorted by   [facility]    Service   Hospitalist    Admission type   Emergency              Arrival complaint   -             Chief Complaint   Patient presents with    Weakness - Generalized     Covid vaccine yesterday, lives at home, daughter had trouble getting pt off toilet.  Denies fever, chills, sob, chest pain.        Initial Presentation: 64 y.o. male  with a PMH of significant recurrent CVAs with chronic left-sided weakness and speech difficulty, HTN, HLD, focal epilepsy, urinary retention presented to the ED from home via EMS w/ generalized weakness.  Patient received COVID-vaccine yesterday, and today is noted significantly weak. He usually ambulated w/ a walker, however had significant difficulty transferring into a wheelchair today as well as getting up from the toilet.  In the ED, CTA head/neck without any new findings. Labs unremarkable.  On exam, All 4 extremities without drift.  5/5 strength right upper extremity and bilateral lower extremities.  4+/5 strength left upper extremity.    Admitted as Inpatient  for evaluation and treatment of generalized weakness.  Plan: Will hold off on stroke pathway for now. PT/OT eval. Reevaluate, if continued difficulty, Consider neurology consult     Date: 04/13   Day 2:   No acute events overnight.  Patient reports his speech seems to improved today, however had more difficulty getting out of bed. Cont PT/OT. MRI brain pending.Continue statin, aspirin and Brilinta.   PE: aaox3, Speech difficulties improving, noted to have difficulty transferring from bed today      ED Triage Vitals   Temperature Pulse Respirations Blood Pressure SpO2   04/11/24 2257 04/11/24 2257 04/11/24 2257 04/11/24 2257 04/11/24 2257   98.1 °F (36.7 °C) 73 18 118/56 99 %      Temp Source Heart Rate Source Patient Position - Orthostatic VS BP Location FiO2 (%)   04/11/24 2257 04/11/24 2257 04/12/24 0636 04/11/24 2257 --   Temporal Monitor Lying Right arm       Pain Score       04/11/24 2257       No Pain          Wt Readings from Last 1 Encounters:   02/11/24 87.1 kg (192 lb)     Additional Vital Signs:   Date/Time Temp Pulse Resp BP MAP (mmHg) SpO2 O2 Device Patient Position - Orthostatic VS   04/13/24 1003 98.1 °F (36.7 °C) -- -- -- -- 96 % None (Room air) --   04/13/24 07:37:48 98.1 °F (36.7 °C) 68 -- 115/64 81 96 % -- --   04/12/24 2000 -- -- -- -- -- 97 % -- --   04/12/24 19:59:17 98.8 °F (37.1 °C) 77 18 127/61 83 97 % -- --   04/12/24 14:28:54 98.8 °F (37.1 °C) -- -- 119/63 82 -- -- --   04/12/24 1428 -- -- -- -- -- -- None (Room air) --   04/12/24 1015 -- 68 16 130/62 -- 98 % None (Room air) Lying   04/12/24 0636 -- 85 18 132/67 -- 98 % None (Room air) Lying     Pertinent Labs/Diagnostic Test Results:   MRI brain wo contrast   Final Result by Reynaldo Barajas DO (04/13 3363)      Advanced diffuse chronic microangiopathic change. Encephalomalacia and gliosis within the left posterior frontal and anterior parietal lobe with chronic hemosiderin deposition.      Small focus of restricted diffusion  "within the left posterior lateral temporal lobe on series 3 image 12 appears new compared to the prior examination suggesting a small acute infarct.      This examination was marked \"immediate notification\" in Epic in order to begin the standard process by which the radiology reading room liaison alerts the referring practitioner.      Workstation performed: [id number]         CTA head and neck with and without contrast   Final Result by Laci Day DO (04/12 0246)      No acute intracranial abnormality. Chronic ischemic changes      No acute large vessel occlusion or dissection.      70% stenosis proximal right cervical ICA      Chronic severe stenosis of the distal right M1 at site of previously seen chronic occlusion.      Additional sites of atherosclerosis, as described.            Workstation performed: [id number]           04/11 EKG result: Normal sinus rhythm  Nonspecific ST and T wave abnormality    04/12 EKG result: NSR      Results from last 7 days   Lab Units 04/12/24  1040 04/12/24  0006   WBC Thousand/uL 5.13 7.24   HEMOGLOBIN g/dL 12.4 12.5   HEMATOCRIT % 37.4 38.8   PLATELETS Thousands/uL 211 212   TOTAL NEUT ABS Thousands/µL 3.25 5.50         Results from last 7 days   Lab Units 04/13/24 0447 04/12/24  1040 04/12/24  0006   SODIUM mmol/L 139 138 136   POTASSIUM mmol/L 3.7 3.6 3.4*   CHLORIDE mmol/L 109* 106 102   CO2 mmol/L 22 25 26   ANION GAP mmol/L 8 7 8   BUN mg/dL 24 17 24   CREATININE mg/dL 0.96 0.83 0.96   EGFR ml/min/1.73sq m 82 92 82   CALCIUM mg/dL 8.5 8.7 8.9   MAGNESIUM mg/dL  --   --  2.0     Results from last 7 days   Lab Units 04/12/24  0006   AST U/L 16   ALT U/L 17   ALK PHOS U/L 92   TOTAL PROTEIN g/dL 6.8   ALBUMIN g/dL 3.9   TOTAL BILIRUBIN mg/dL 0.61         Results from last 7 days   Lab Units 04/13/24 0447 04/12/24  1040 04/12/24  0006   GLUCOSE RANDOM mg/dL 105 93 95                 Results from last 7 days   Lab Units 04/12/24  1726   CLARITY UA  Clear   COLOR UA  Yellow "   SPEC GRAV UA  1.010   PH UA  6.0   GLUCOSE UA mg/dl Negative   KETONES UA mg/dl Negative   BLOOD UA  Moderate*   PROTEIN UA mg/dl Trace*   NITRITE UA  Negative   BILIRUBIN UA  Negative   UROBILINOGEN UA (BE) mg/dl <2.0   LEUKOCYTES UA  Negative   WBC UA /hpf None Seen   RBC UA /hpf 10-20*   BACTERIA UA /hpf Occasional   EPITHELIAL CELLS WET PREP /hpf Occasional   MUCUS THREADS  Occasional*           ED Treatment:   Medication Administration from 04/11/2024 2252 to 04/12/2024 1421         Date/Time Order Dose Route Action     04/12/2024 0122 EDT sodium chloride 0.9 % bolus 500 mL 0 mL Intravenous Stopped     04/12/2024 0007 EDT sodium chloride 0.9 % bolus 500 mL 500 mL Intravenous New Bag     04/12/2024 0212 EDT iohexol (OMNIPAQUE) 350 MG/ML injection (MULTI-DOSE) 85 mL 85 mL Intravenous Given     04/12/2024 1024 EDT aspirin (ECOTRIN LOW STRENGTH) EC tablet 81 mg 81 mg Oral Given     04/12/2024 1024 EDT atorvastatin (LIPITOR) tablet 40 mg 40 mg Oral Given     04/12/2024 1023 EDT donepezil (ARICEPT) tablet 10 mg 10 mg Oral Given     04/12/2024 1024 EDT levETIRAcetam (KEPPRA) tablet 500 mg 500 mg Oral Given     04/12/2024 1024 EDT losartan (COZAAR) tablet 50 mg 50 mg Oral Given     04/12/2024 1024 EDT metoprolol succinate (TOPROL-XL) 24 hr tablet 100 mg 100 mg Oral Given     04/12/2024 1024 EDT pantoprazole (PROTONIX) EC tablet 40 mg 40 mg Oral Given     04/12/2024 1024 EDT ticagrelor (BRILINTA) tablet 90 mg 90 mg Oral Given     04/12/2024 1023 EDT enoxaparin (LOVENOX) subcutaneous injection 40 mg 40 mg Subcutaneous Given     04/12/2024 1024 EDT potassium chloride (Klor-Con M20) CR tablet 40 mEq 40 mEq Oral Given          Past Medical History:   Diagnosis Date    Depression     Diabetes mellitus (HCC)     patient denies    Dyslipidemia 03/26/2019    GERD (gastroesophageal reflux disease)     Hyperlipidemia     Hypertension     Prediabetes     Prediabetes 04/03/2019    Stroke (HCC)      Present on Admission:   Acute  CVA (cerebrovascular accident) (HCC)   Urinary retention   Recurrent strokes (HCC)   Primary hypertension   Focal epilepsy (HCC)      Admitting Diagnosis: Weakness [R53.1]  Generalized weakness [R53.1]  Ambulatory dysfunction [R26.2]  Age/Sex: 64 y.o. male  Admission Orders:  SCd  PT/OT    Scheduled Medications:  aspirin, 81 mg, Oral, Daily  atorvastatin, 40 mg, Oral, Daily With Breakfast  donepezil, 10 mg, Oral, Daily  enoxaparin, 40 mg, Subcutaneous, Daily  levETIRAcetam, 500 mg, Oral, BID  losartan, 50 mg, Oral, Daily  melatonin, 6 mg, Oral, HS  metoprolol succinate, 100 mg, Oral, Daily  pantoprazole, 40 mg, Oral, Daily  tamsulosin, 0.4 mg, Oral, Daily With Dinner  ticagrelor, 90 mg, Oral, Q12H JOSE  traZODone, 100 mg, Oral, HS      Continuous IV Infusions: none     PRN Meds:  acetaminophen, 650 mg, Oral, Q6H PRN  aluminum-magnesium hydroxide-simethicone, 30 mL, Oral, Q6H PRN  baclofen, 5 mg, Oral, BID PRN        IP CONSULT TO NEUROLOGY    Network Utilization Review Department  ATTENTION: Please call with any questions or concerns to [phone] and carefully listen to the prompts so that you are directed to the right person. All voicemails are confidential.   For Discharge needs, contact Care Management DC Support Team at [phone] opt. 2  Send all requests for admission clinical reviews, approved or denied determinations and any other requests to dedicated fax number below belonging to the campus where the patient is receiving treatment. List of dedicated fax numbers for the Facilities:  FACILITY NAME UR FAX NUMBER   ADMISSION DENIALS (Administrative/Medical Necessity) [phone]   DISCHARGE SUPPORT TEAM (NETWORK) [phone]   PARENT CHILD HEALTH (Maternity/NICU/Pediatrics) [phone]   [facility] [phone]   [facility] [phone]   [facility] [phone]   [facility] [phone]   [facility]  [facility] [phone]   [facility] [phone]   [facility] [phone]   [facility] [phone]   [facility] [phone]   [facility] [phone]   [facility] [phone]   [facility] [phone]

## 2024-04-13 NOTE — ASSESSMENT & PLAN NOTE
Presents due to generalized weakness following COVID vaccination yesterday  Normally ambulates with walker, however today, had difficulty getting into the wheelchair as well as getting up from the toilet  Also with chronic speech difficulties and left sided weakness which seem to be more pronounced today  CTA head/neck- No acute intracranial abnormality. Chronic ischemic changes. No acute large vessel occlusion or dissection. 70% stenosis proximal right cervical ICA. Chronic severe stenosis of the distal right M1 at site of previously seen chronic occlusion.  MRI brain showed a new small acute infarct  Patient has had multiple CVAs including on Eliquis, currently on aspirin/Brilinta  Will ask neurology to evaluate for further recommendations regarding antiplatelet therapy versus anticoagulation  PT/OT eval -cleared to return home, however may need require reevaluation prior to discharge given worsening physical status today.

## 2024-04-14 LAB
CHOLEST SERPL-MCNC: 90 MG/DL
ERYTHROCYTE [DISTWIDTH] IN BLOOD BY AUTOMATED COUNT: 13.2 % (ref 11.6–15.1)
EST. AVERAGE GLUCOSE BLD GHB EST-MCNC: 105 MG/DL
HBA1C MFR BLD: 5.3 %
HCT VFR BLD AUTO: 38.7 % (ref 36.5–49.3)
HDLC SERPL-MCNC: 26 MG/DL
HGB BLD-MCNC: 12.6 G/DL (ref 12–17)
LDLC SERPL CALC-MCNC: 40 MG/DL (ref 0–100)
MCH RBC QN AUTO: 31 PG (ref 26.8–34.3)
MCHC RBC AUTO-ENTMCNC: 32.6 G/DL (ref 31.4–37.4)
MCV RBC AUTO: 95 FL (ref 82–98)
PLATELET # BLD AUTO: 211 THOUSANDS/UL (ref 149–390)
PMV BLD AUTO: 9.9 FL (ref 8.9–12.7)
RBC # BLD AUTO: 4.07 MILLION/UL (ref 3.88–5.62)
TRIGL SERPL-MCNC: 121 MG/DL
WBC # BLD AUTO: 7.21 THOUSAND/UL (ref 4.31–10.16)

## 2024-04-14 PROCEDURE — 80061 LIPID PANEL: CPT | Performed by: PHYSICIAN ASSISTANT

## 2024-04-14 PROCEDURE — 99255 IP/OBS CONSLTJ NEW/EST HI 80: CPT | Performed by: PSYCHIATRY & NEUROLOGY

## 2024-04-14 PROCEDURE — 83036 HEMOGLOBIN GLYCOSYLATED A1C: CPT | Performed by: PHYSICIAN ASSISTANT

## 2024-04-14 PROCEDURE — 85027 COMPLETE CBC AUTOMATED: CPT | Performed by: PHYSICIAN ASSISTANT

## 2024-04-14 PROCEDURE — 99232 SBSQ HOSP IP/OBS MODERATE 35: CPT | Performed by: PHYSICIAN ASSISTANT

## 2024-04-14 RX ORDER — CILOSTAZOL 50 MG/1
100 TABLET ORAL
Status: DISCONTINUED | OUTPATIENT
Start: 2024-04-14 | End: 2024-04-16 | Stop reason: HOSPADM

## 2024-04-14 RX ADMIN — TRAZODONE HYDROCHLORIDE 100 MG: 50 TABLET ORAL at 22:20

## 2024-04-14 RX ADMIN — ASPIRIN 81 MG: 81 TABLET, COATED ORAL at 08:05

## 2024-04-14 RX ADMIN — Medication 6 MG: at 22:20

## 2024-04-14 RX ADMIN — CILOSTAZOL 100 MG: 50 TABLET ORAL at 20:15

## 2024-04-14 RX ADMIN — PANTOPRAZOLE SODIUM 40 MG: 40 TABLET, DELAYED RELEASE ORAL at 08:06

## 2024-04-14 RX ADMIN — ATORVASTATIN CALCIUM 40 MG: 40 TABLET, FILM COATED ORAL at 08:05

## 2024-04-14 RX ADMIN — DONEPEZIL HYDROCHLORIDE 10 MG: 5 TABLET ORAL at 08:05

## 2024-04-14 RX ADMIN — TICAGRELOR 90 MG: 90 TABLET ORAL at 22:20

## 2024-04-14 RX ADMIN — TAMSULOSIN HYDROCHLORIDE 0.4 MG: 0.4 CAPSULE ORAL at 18:29

## 2024-04-14 RX ADMIN — TICAGRELOR 90 MG: 90 TABLET ORAL at 08:06

## 2024-04-14 RX ADMIN — LEVETIRACETAM 500 MG: 500 TABLET, FILM COATED ORAL at 22:20

## 2024-04-14 RX ADMIN — LEVETIRACETAM 500 MG: 500 TABLET, FILM COATED ORAL at 08:05

## 2024-04-14 RX ADMIN — CILOSTAZOL 100 MG: 50 TABLET ORAL at 10:38

## 2024-04-14 RX ADMIN — LOSARTAN POTASSIUM 50 MG: 50 TABLET, FILM COATED ORAL at 08:06

## 2024-04-14 RX ADMIN — METOPROLOL SUCCINATE 100 MG: 50 TABLET, EXTENDED RELEASE ORAL at 08:05

## 2024-04-14 RX ADMIN — ENOXAPARIN SODIUM 40 MG: 100 INJECTION SUBCUTANEOUS at 08:06

## 2024-04-14 NOTE — PLAN OF CARE
Problem: Potential for Falls  Goal: Patient will remain free of falls  Description: INTERVENTIONS:  - Educate patient/family on patient safety including physical limitations  - Instruct patient to call for assistance with activity   - Consult OT/PT to assist with strengthening/mobility   - Keep Call bell within reach  - Keep bed low and locked with side rails adjusted as appropriate  - Keep care items and personal belongings within reach  - Initiate and maintain comfort rounds  - Make Fall Risk Sign visible to staff  - Offer Toileting every x Hours, in advance of need  - Initiate/Maintain xalarm  - Obtain necessary fall risk management equipment: xxxx  - Apply yellow socks and bracelet for high fall risk patients  - Consider moving patient to room near nurses station  Outcome: Progressing     Problem: Prexisting or High Potential for Compromised Skin Integrity  Goal: Skin integrity is maintained or improved  Description: INTERVENTIONS:  - Identify patients at risk for skin breakdown  - Assess and monitor skin integrity  - Assess and monitor nutrition and hydration status  - Monitor labs   - Assess for incontinence   - Turn and reposition patient  - Assist with mobility/ambulation  - Relieve pressure over bony prominences  - Avoid friction and shearing  - Provide appropriate hygiene as needed including keeping skin clean and dry  - Evaluate need for skin moisturizer/barrier cream  - Collaborate with interdisciplinary team   - Patient/family teaching  - Consider wound care consult   Outcome: Progressing     Problem: NEUROSENSORY - ADULT  Goal: Achieves stable or improved neurological status  Description: INTERVENTIONS  - Monitor and report changes in neurological status  - Monitor vital signs such as temperature, blood pressure, glucose, and any other labs ordered   - Initiate measures to prevent increased intracranial pressure  - Monitor for seizure activity and implement precautions if appropriate      Outcome:  Progressing

## 2024-04-14 NOTE — CONSULTS
Consultation - Neurology   Constanza Zhu 64 y.o. male MRN: [id number]  Unit/Bed#: -01 Encounter: [id number]      Assessment/Plan     Mr. Constanza Zhu is a pleasant 65 yo male extensive past medical history of symptoms as noted below including LICA stent angioplasty in 2023 due to symptomatic stroke, asymptomatic STEVE stenosis, seen in consultation for generalized weakness, mildly worse speech and ambulatory dysfunction than baseline( he uses a walker ) noted after COVID vaccination with imaging consistent with punctate acute ischemic infarction left temporal lobe.    -MRI brain reviewed completed here showing small area of punctate ischemia in the left posterior temporal lobe-potential atheroembolic etiology versus cannot rule out cardioembolic source. (Did have a loop recorder in the past for 3 years per chart review that did not show any atrial fibrillation)      -CT head here not acute, CTA head and neck with overall stable right proximal ICA stenosis at 70% in comparison to prior notes and left ICA status post stent appears patent.  He does have at least mild calcified atherosclerotic plaque in the supraclinoid ICA as well on the right.    -Secondary stroke prevention would continue aspirin and Brilinta, added back Pletal.  Continue with statin.  I did discuss with neurosurgery Dr. Mattson who placed patient's LICA stent in the past. He is agreeable with above noted plan.  - Check ECHOcardiogram  - I placed patient on telemetry  - As > 72 hours of symptoms now- goal normotension  - PT/OT/SLP evals recommended  - Stroke protocol  - Lipid panel reviewed with LDL 40, normal cholesterol and triglycerides, low HDL  - HgbA1C pending    Hx focal epilepsy  - Pt on Keppra 500 BID OP, no seizure like episodes reported here. Continue this dosing    Will follow up. Pt should follow up with stroke neurology attending phsyician  whom he has seen prior within 6 weeks of discharge          History of Present  Illness     Reason for Consult / Principal Problem: generalized weakness and worse speech/ambulation than baseline  Hx and PE limited by: Pt limited historian/ hx obtained from chart  HPI: Constanza Zhu is a 64 y.o.  male history of, hx RMCA M1 stroke s/p thrombectomy the subsequent left MCA stroke 2/2 suspected etiology symptomatic left carotid stenosis, status post left ICA stent 5/23, supposed to be on aspirin Brilinta and atorvastatin per last neurology note from January 20, 2024, focal epilepsy on 500 mg twice daily Keppra who presents with worse speech difficulties and ambulation noted after COVID vaccination administration the day prior.      On exam today patient tells me he feels better. Denies new weakness, history is somewhat limited due to patient's moderate expressive aphasia however is consistent and accurate with yes/no and answers to simple questions.  Denies headache dizziness new vision changes. Denies chest pain.    CT  head no contrast not acute, prior noted left parieto occipital encephalomalacia noted again.  CTA H/N with 70% proximal right cervical ICA stenosis which in comparison to prior notes including neurology and neurosurgery note review appears to be stable.  Chronic severe stenosis distal right M1 at the site of previous occlusion  LICA stent patent  MRI brain completed this morning with extremely punctate ischemia noted left posterior temporal lobe  Per chart review loop recorder x 3 years did not show Afib  Inpatient consult to Neurology  Consult performed by: Nasima Nair DO  Consult ordered by: Dakota Tejada PA-C          Review of Systems 10 point ROS completed and negative other than that noted above however limited due to patients aphasia    Historical Information   Past Medical History:   Diagnosis Date    Depression     Diabetes mellitus (HCC)     patient denies    Dyslipidemia 03/26/2019    GERD (gastroesophageal reflux disease)     Hyperlipidemia      Hypertension     Prediabetes     Prediabetes 04/03/2019    Stroke (HCC)      Past Surgical History:   Procedure Laterality Date    ARTHROSCOPIC REPAIR ACL Left     BACK SURGERY      twice    CARPAL TUNNEL RELEASE Right     IR CEREBRAL ANGIOGRAPHY  05/04/2023    IR STROKE ALERT  03/19/2019    SHOULDER SURGERY Left     US GUIDED THYROID BIOPSY  11/21/2023     Social History   Social History     Substance and Sexual Activity   Alcohol Use Never     Social History     Substance and Sexual Activity   Drug Use Never     E-Cigarette/Vaping    E-Cigarette Use Never User      E-Cigarette/Vaping Substances    Nicotine No     THC No     CBD No     Flavoring No     Other No     Unknown No      Social History     Tobacco Use   Smoking Status Former   Smokeless Tobacco Never     Family History:   Family History   Problem Relation Age of Onset    Hyperlipidemia Mother     Hypertension Mother     Diabetes unspecified Mother         prediabetes    Heart attack Mother     Diabetes Mother     Hyperlipidemia Father     Hypertension Father     Prostate cancer Father     Stroke Father     Hyperlipidemia Sister     Hypertension Sister     Hyperlipidemia Sister     Hypertension Sister     Depression Sister     Hypertension Sister     Hyperlipidemia Sister     Depression Sister     Hyperlipidemia Brother     Hypertension Brother     Hypertension Brother     Prostate cancer Brother     Hypothyroidism Daughter     Hypothyroidism Son     Diabetes unspecified Son     Seizures Neg Hx        Review of previous medical records was  completed.     Meds/Allergies   all current active meds have been reviewed    Allergies   Allergen Reactions    Flexeril [Cyclobenzaprine] Drowsiness    Lisinopril Cough    Nsaids Confusion       Objective   Vitals:Blood pressure 149/77, pulse 70, temperature 97.7 °F (36.5 °C), temperature source Temporal, resp. rate 21, SpO2 98%.,There is no height or weight on file to calculate BMI.    Intake/Output Summary (Last 24  hours) at 4/14/2024 0834  Last data filed at 4/14/2024 0601  Gross per 24 hour   Intake 590 ml   Output 1200 ml   Net -610 ml       Invasive Devices:   Invasive Devices       Peripheral Intravenous Line  Duration             Peripheral IV 04/12/24 Right Antecubital 2 days                    Physical Exam  Neurologic Exam    Gen: NAD  HEENT: NCAT, EOMI  Resp: Symmetric chest rise bl  CVS: RRR  Ext: no edema    AO X person and place only not time, moderate aphasia, he can speak in simple short sentences however does have paraphasic errors and difficulty with longer sentences, naming starts with accurate syllable however  cannot finish the word, repetition- intact 50% of the time  CN 2-12 grossly intact, he is now able to state accurate finger movement, in bl VF. No dense hemianopsia noted  Motor: intact antigravity with 5/5 strength in all extremities  Sensation: Intact to LT/temp in all ext  Cerebellar: No dysmetria b/l FNF or HS testing  Reflexes: bl babinski, no clonus noted  Gait: deferred fall risk    Lab Results: I have personally reviewed pertinent reports.    Imaging Studies: I have personally reviewed pertinent films in PACS  EKG, Pathology, and Other Studies: I have personally reviewed pertinent reports.    VTE Prophylaxis: Enoxaparin (Lovenox)    Code Status: Level 1 - Full Code  Advance Directive and Living Will:      Power of :    POLST:      Counseling / Coordination of Care  Total time spent today 60 minutes. Greater than 50% of total time was spent with the patient and / or family counseling and / or coordination of care. A description of the counseling / coordination of care: as noted above in A/P including discussing case with his OP neurosurgeon  and updating primary team

## 2024-04-14 NOTE — PLAN OF CARE
Problem: Potential for Falls  Goal: Patient will remain free of falls  Description: INTERVENTIONS:  - Educate patient/family on patient safety including physical limitations  - Instruct patient to call for assistance with activity   - Consult OT/PT to assist with strengthening/mobility   - Keep Call bell within reach  - Keep bed low and locked with side rails adjusted as appropriate  - Keep care items and personal belongings within reach  - Initiate and maintain comfort rounds  - Make Fall Risk Sign visible to staff  - Offer Toileting every 2 Hours, in advance of need  - Initiate/Maintain bed/chair alarm  - Obtain necessary fall risk management equipment  - Apply yellow socks and bracelet for high fall risk patients  - Consider moving patient to room near nurses station  Outcome: Progressing     Problem: Prexisting or High Potential for Compromised Skin Integrity  Goal: Skin integrity is maintained or improved  Description: INTERVENTIONS:  - Identify patients at risk for skin breakdown  - Assess and monitor skin integrity  - Assess and monitor nutrition and hydration status  - Monitor labs   - Assess for incontinence   - Turn and reposition patient  - Assist with mobility/ambulation  - Relieve pressure over bony prominences  - Avoid friction and shearing  - Provide appropriate hygiene as needed including keeping skin clean and dry  - Evaluate need for skin moisturizer/barrier cream  - Collaborate with interdisciplinary team   - Patient/family teaching  - Consider wound care consult   Outcome: Progressing     Problem: NEUROSENSORY - ADULT  Goal: Achieves stable or improved neurological status  Description: INTERVENTIONS  - Monitor and report changes in neurological status  - Monitor vital signs such as temperature, blood pressure, glucose, and any other labs ordered   - Initiate measures to prevent increased intracranial pressure  - Monitor for seizure activity and implement precautions if appropriate      Outcome:  Progressing

## 2024-04-14 NOTE — ASSESSMENT & PLAN NOTE
Presents due to generalized weakness following COVID vaccination yesterday  Normally ambulates with walker, however today, had difficulty getting into the wheelchair as well as getting up from the toilet  Also with chronic speech difficulties and left sided weakness which seem to be more pronounced today  CTA head/neck- No acute intracranial abnormality. Chronic ischemic changes. No acute large vessel occlusion or dissection. 70% stenosis proximal right cervical ICA. Chronic severe stenosis of the distal right M1 at site of previously seen chronic occlusion.  MRI brain showed a new small acute infarct  Patient has had multiple CVAs including on Eliquis, currently on aspirin/Brilinta  Neurology added Pletal.  Will determine on AC pending workup  Check echo, monitor on telemetry for A-fib/flutter.  If patient develops A-fib/flutter, will need to transition to anticoagulant.  PT/OT eval -cleared to return home, however may need require reevaluation prior to discharge given worsening physical status today.

## 2024-04-14 NOTE — ASSESSMENT & PLAN NOTE
Presents due to generalized weakness following COVID vaccination + acute on chronic speech difficulty secondary to prior CVA  CTA head/neck- No acute intracranial abnormality. Chronic ischemic changes. No acute large vessel occlusion or dissection. 70% stenosis proximal right cervical ICA. Chronic severe stenosis of the distal right M1 at site of previously seen chronic occlusion.  Neurology consulted   MRI brain: Small focus of restricted diffusion within the left posterior lateral temporal lobe on series 3 image 12 appears new compared to the prior examination suggesting a small acute infarct.   Continue aspirin/brilinta  Pletal added   TTE pending   Monitor tele for A-fib/flutter.  If patient develops A-fib/flutter, will need to transition to anticoagulant.  PT/OT eval -cleared to return home with [facility]

## 2024-04-14 NOTE — PROGRESS NOTES
[facility]  Progress Note  Name: Constanza Zhu I  MRN: [id number]  Unit/Bed#: -01 I Date of Admission: 4/11/2024   Date of Service: 4/14/2024 I Hospital Day: 2    Assessment/Plan   * Acute CVA (cerebrovascular accident) (HCC)  Assessment & Plan  Presents due to generalized weakness following COVID vaccination yesterday  Normally ambulates with walker, however today, had difficulty getting into the wheelchair as well as getting up from the toilet  Also with chronic speech difficulties and left sided weakness which seem to be more pronounced today  CTA head/neck- No acute intracranial abnormality. Chronic ischemic changes. No acute large vessel occlusion or dissection. 70% stenosis proximal right cervical ICA. Chronic severe stenosis of the distal right M1 at site of previously seen chronic occlusion.  MRI brain showed a new small acute infarct  Patient has had multiple CVAs including on Eliquis, currently on aspirin/Brilinta  Neurology added Pletal.  Will determine on AC pending workup  Check echo, monitor on telemetry for A-fib/flutter.  If patient develops A-fib/flutter, will need to transition to anticoagulant.  PT/OT eval -cleared to return home, however may need require reevaluation prior to discharge given worsening physical status today.    Focal epilepsy (HCC)  Assessment & Plan  Continue Keppra    Recurrent strokes (HCC)  Assessment & Plan  History of recurrent strokes  Continue statin, aspirin and Brilinta    Urinary retention  Assessment & Plan  Continue Flomax    Primary hypertension  Assessment & Plan  Continue losartan metoprolol               VTE Pharmacologic Prophylaxis: VTE Score: 3 Moderate Risk (Score 3-4) - Pharmacological DVT Prophylaxis Ordered: enoxaparin (Lovenox).    Mobility:   Basic Mobility Inpatient Raw Score: 18  JH-HLM Goal: 6: Walk 10 steps or more  JH-HLM Achieved: 6: Walk 10 steps or more  JH-HLM Goal achieved. Continue to encourage appropriate  mobility.    Patient Centered Rounds: I performed bedside rounds with nursing staff today.   Discussions with Specialists or Other Care Team Provider: Neurology    Education and Discussions with Family / Patient: Attempted to update  (son) via phone. Left voicemail.     Total Time Spent on Date of Encounter in care of patient: 50 mins. This time was spent on one or more of the following: performing physical exam; counseling and coordination of care; obtaining or reviewing history; documenting in the medical record; reviewing/ordering tests, medications or procedures; communicating with other healthcare professionals and discussing with patient's family/caregivers.    Current Length of Stay: 2 day(s)  Current Patient Status: Inpatient   Certification Statement: The patient will continue to require additional inpatient hospital stay due to stroke workup  Discharge Plan: Anticipate discharge in 24-48 hrs to home.    Code Status: Level 1 - Full Code    Subjective:   No acute events overnight, patient reports that he feels his symptoms are improving slightly    Objective:     Vitals:   Temp (24hrs), Av.4 °F (36.9 °C), Min:97.7 °F (36.5 °C), Max:99 °F (37.2 °C)    Temp:  [97.7 °F (36.5 °C)-99 °F (37.2 °C)] 97.7 °F (36.5 °C)  HR:  [57-70] 70  Resp:  [17-21] 21  BP: (145-157)/(71-77) 149/77  SpO2:  [96 %-99 %] 98 %  There is no height or weight on file to calculate BMI.     Input and Output Summary (last 24 hours):     Intake/Output Summary (Last 24 hours) at 2024 1310  Last data filed at 2024 0601  Gross per 24 hour   Intake 490 ml   Output 1200 ml   Net -710 ml       Physical Exam:   Physical Exam  Vitals and nursing note reviewed.   Constitutional:       Appearance: Normal appearance.   HENT:      Head: Normocephalic and atraumatic.      Mouth/Throat:      Mouth: Mucous membranes are moist.      Pharynx: Oropharynx is clear. No oropharyngeal exudate.   Eyes:      Extraocular Movements:  Extraocular movements intact.   Cardiovascular:      Rate and Rhythm: Normal rate and regular rhythm.      Pulses: Normal pulses.      Heart sounds: Normal heart sounds. No murmur heard.     No friction rub. No gallop.   Pulmonary:      Effort: Pulmonary effort is normal. No respiratory distress.      Breath sounds: Normal breath sounds. No stridor. No wheezing or rales.   Abdominal:      General: Abdomen is flat. Bowel sounds are normal. There is no distension.      Palpations: Abdomen is soft.      Tenderness: There is no abdominal tenderness.   Musculoskeletal:      Right lower leg: No edema.      Left lower leg: No edema.   Skin:     General: Skin is warm and dry.   Neurological:      General: No focal deficit present.      Mental Status: He is alert and oriented to person, place, and time.      Comments: Some dysarthria, however improving          Additional Data:     Labs:  Results from last 7 days   Lab Units 04/14/24  0504 04/12/24  1040   WBC Thousand/uL 7.21 5.13   HEMOGLOBIN g/dL 12.6 12.4   HEMATOCRIT % 38.7 37.4   PLATELETS Thousands/uL 211 211   SEGS PCT %  --  64   LYMPHO PCT %  --  18   MONO PCT %  --  16*   EOS PCT %  --  1     Results from last 7 days   Lab Units 04/13/24  0447 04/12/24  1040 04/12/24  0006   SODIUM mmol/L 139   < > 136   POTASSIUM mmol/L 3.7   < > 3.4*   CHLORIDE mmol/L 109*   < > 102   CO2 mmol/L 22   < > 26   BUN mg/dL 24   < > 24   CREATININE mg/dL 0.96   < > 0.96   ANION GAP mmol/L 8   < > 8   CALCIUM mg/dL 8.5   < > 8.9   ALBUMIN g/dL  --   --  3.9   TOTAL BILIRUBIN mg/dL  --   --  0.61   ALK PHOS U/L  --   --  92   ALT U/L  --   --  17   AST U/L  --   --  16   GLUCOSE RANDOM mg/dL 105   < > 95    < > = values in this interval not displayed.             Results from last 7 days   Lab Units 04/14/24  0504   HEMOGLOBIN A1C % 5.3           Lines/Drains:  Invasive Devices       Peripheral Intravenous Line  Duration             Peripheral IV 04/12/24 Right Antecubital 2 days                       Telemetry:  Telemetry Orders (From admission, onward)               24 Hour Telemetry Monitoring  Continuous x 24 Hours (Telem)        Question:  Reason for 24 Hour Telemetry  Answer:  TIA/Suspected CVA/ Confirmed CVA                     Telemetry Reviewed:  Just placed on telemetry now, will monitor  Indication for Continued Telemetry Use: Acute CVA             Imaging: Reviewed radiology reports from this admission including: MRI brain    Recent Cultures (last 7 days):         Last 24 Hours Medication List:   Current Facility-Administered Medications   Medication Dose Route Frequency Provider Last Rate    acetaminophen  650 mg Oral Q6H PRN Dakota Tejada PA-C      aluminum-magnesium hydroxide-simethicone  30 mL Oral Q6H PRN Dakota Tejada PA-C      aspirin  81 mg Oral Daily Dakota Tejada PA-C      atorvastatin  40 mg Oral Daily With Breakfast Dakota Tejada PA-C      baclofen  5 mg Oral BID PRN Dakota Tejada PA-C      cilostazol  100 mg Oral BID AC Nasimaconchis Nair,       donepezil  10 mg Oral Daily Dakota Tejada PA-C      enoxaparin  40 mg Subcutaneous Daily Dakota Tejada PA-C      levETIRAcetam  500 mg Oral BID Dakota Tejada PA-C      losartan  50 mg Oral Daily Dakota Tejada PA-C      melatonin  6 mg Oral HS Dakota Tejada PA-C      metoprolol succinate  100 mg Oral Daily Dakota Tejada PA-C      pantoprazole  40 mg Oral Daily Dakota Tejada PA-C      tamsulosin  0.4 mg Oral Daily With Dinner Dakota Tejada PA-C      ticagrelor  90 mg Oral Q12H JOSE Dakota Tejada PA-C      traZODone  100 mg Oral HS Dakota Tejada PA-C          Today, Patient Was Seen By: Dakota Tejada PA-C    **Please Note: This note may have been constructed using a voice recognition system.**

## 2024-04-14 NOTE — DISCHARGE SUMMARY
"[facility]  Discharge- Constanza Zhu 1960, 64 y.o. male MRN: [id number]  Unit/Bed#: -01 Encounter: [id number]  Primary Care Provider: ERIC Calixto   Date and time admitted to hospital: 4/11/2024 10:59 PM    * Acute CVA (cerebrovascular accident) (HCC)  Assessment & Plan  Presents due to generalized weakness following COVID vaccination + acute on chronic speech difficulty secondary to prior CVA  CTA head/neck- No acute intracranial abnormality. Chronic ischemic changes. No acute large vessel occlusion or dissection. 70% stenosis proximal right cervical ICA. Chronic severe stenosis of the distal right M1 at site of previously seen chronic occlusion.  Neurology consulted   MRI brain: Small focus of restricted diffusion within the left posterior lateral temporal lobe on series 3 image 12 appears new compared to the prior examination suggesting a small acute infarct.   Continue aspirin/brilinta  Pletal added, continue on discharge   TTE: EF 65% no abnormal wall motion or PFO. Mild AS.   Telemetry without evidence of afib > 24 hours   Outpatient follow up with neurology within 4-6 weeks   PT/OT eval -cleared to return home with [facility]    Internal carotid artery stenosis, left  Assessment & Plan  LICA stenosis S/P angioplasty in 2023 with neurosurgery Dr Mattson   Per neurology: \"CTA head and neck with overall stable right proximal ICA stenosis at 70% in comparison to prior notes and left ICA status post stent appears patent. He does have at least mild calcified atherosclerotic plaque in the supraclinoid ICA as well on the right.\"   Neurology discussed case with Dr Mattson - no neurosurgical interventions necessary  Agree with adding pletal to prior regimen as above   Continue outpatient follow up     Focal epilepsy (HCC)  Assessment & Plan  Continue Keppra    Recurrent strokes ([facility])  Assessment & Plan  History of recurrent strokes  Continue statin, aspirin and " "Brilinta    Urinary retention  Assessment & Plan  Continue Flomax    Primary hypertension  Assessment & Plan  Continue losartan + metoprolol        Medical Problems       Resolved Problems  Date Reviewed: 4/16/2024   None       Discharging Physician / Practitioner: Sepideh Aldrich PA-C  PCP: ERIC Calixto  Admission Date:   Admission Orders (From admission, onward)       Ordered        04/12/24 0327  INPATIENT ADMISSION  Once                          Discharge Date: 04/16/24    Consultations During Hospital Stay:  Neurology  PT/OT/ST    Procedures Performed:   None     Significant Findings / Test Results:   MRI brain wo contrast  Result Date: 4/13/2024  Impression: Advanced diffuse chronic microangiopathic change. Encephalomalacia and gliosis within the left posterior frontal and anterior parietal lobe with chronic hemosiderin deposition. Small focus of restricted diffusion within the left posterior lateral temporal lobe on series 3 image 12 appears new compared to the prior examination suggesting a small acute infarct. This examination was marked \"immediate notification\" in Epic in order to begin the standard process by which the radiology reading room liaison alerts the referring practitioner. Workstation performed: [id number]     CTA head and neck with and without contrast  Result Date: 4/12/2024  Impression: No acute intracranial abnormality. Chronic ischemic changes No acute large vessel occlusion or dissection. 70% stenosis proximal right cervical ICA Chronic severe stenosis of the distal right M1 at site of previously seen chronic occlusion. Additional sites of atherosclerosis, as described. Workstation performed: [id number]      Echocardiogram:   Left Ventricle: Left ventricular cavity size is normal. Wall thickness is mildly increased. The left ventricular ejection fraction is 65%. Systolic function is normal. Wall motion is normal. Diastolic function is mildly abnormal, consistent with " grade I (abnormal) relaxation. Right Ventricle: Right ventricular cavity size is normal. Systolic function is normal. Left Atrium: The atrium is moderately dilated. Aortic Valve: There is mild regurgitation. There is mild stenosis.    Incidental Findings:   None    Test Results Pending at Discharge (will require follow up):   None     Outpatient Tests Requested:  None     Complications:  None     Reason for Admission: Generalized weakness, worsening chronic stroke symptoms    Hospital Course:   Constanza Zhu is a 64 y.o. male patient with PMH significant recurrent CVAs with chronic left-sided weakness and speech difficulty, HTN, HLD, focal epilepsy, urinary retention who originally presented to the hospital on 4/11/2024 due to generalized weakness and worsening dysarthria/left-sided weakness after receiving a COVID-vaccine the day prior.  He was noted to be unable to transfer into wheelchair despite being able to ambulate with walker at baseline.  Given his stroke burden, he underwent an MRI which showed again a new punctate stroke, currently on aspirin/Brilinta.  Neurology was consulted and added Pletal.  Neurology discussed case with neurosurgery given prior LICA stent placement. Per neurosurgery there was no need for inpatient neurosurgical intervention, agreed with medical management with pletal. Lipid panel was WNL.  A1c 5.3. Patient underwent echocardiogram and was monitored on telemetry in order to rule out cardioembolic source. Echo was without PFO and telemetry showed normal sinus rhythm  He was evaluated PT/OT who recommended discharge home with [facility].  He will require follow-up with neurology in the next 6 weeks. Patient also recommended to follow up with outpatient cardiology and outpatient neurosurgery.     Please see above list of diagnoses and related plan for additional information.     Condition at Discharge: stable    Discharge Day Visit / Exam:   Subjective:  Patient denies new complaints. He is  "asking to be discharged to home. Mild aphasia persists however doing well with eating, getting up out of bed, etc.     Vitals: Blood Pressure: 166/81 (04/16/24 1516)  Pulse: 84 (04/16/24 1516)  Temperature: 98.5 °F (36.9 °C) (04/16/24 1516)  Temp Source: Oral (04/15/24 0850)  Respirations: 18 (04/16/24 0831)  Height: 5' 8\" (172.7 cm) (04/16/24 1105)  Weight - Scale: 89.4 kg (197 lb) (04/16/24 1105)  SpO2: 97 % (04/16/24 1516)  Exam:   Physical Exam  Vitals and nursing note reviewed.   Constitutional:       General: He is not in acute distress.     Appearance: He is well-developed. He is ill-appearing.   HENT:      Head: Normocephalic and atraumatic.   Eyes:      General:         Right eye: No discharge.         Left eye: No discharge.      Extraocular Movements: Extraocular movements intact.      Conjunctiva/sclera: Conjunctivae normal.   Cardiovascular:      Rate and Rhythm: Normal rate and regular rhythm.      Heart sounds: No murmur heard.  Pulmonary:      Effort: Pulmonary effort is normal. No respiratory distress.      Breath sounds: Normal breath sounds. No wheezing, rhonchi or rales.   Abdominal:      General: Bowel sounds are normal. There is no distension.      Palpations: Abdomen is soft.      Tenderness: There is no abdominal tenderness.   Musculoskeletal:      Cervical back: Neck supple.      Right lower leg: No edema.      Left lower leg: No edema.   Skin:     General: Skin is warm and dry.      Capillary Refill: Capillary refill takes less than 2 seconds.   Neurological:      Mental Status: He is alert. Mental status is at baseline.      Comments: Mild expressive aphasia persists   Psychiatric:         Mood and Affect: Mood normal.         Behavior: Behavior normal.          Discussion with Family: Updated  (son) via phone.    Discharge instructions/Information to patient and family:   See after visit summary for information provided to patient and family.      Provisions for Follow-Up " Care:  See after visit summary for information related to follow-up care and any pertinent home health orders.      Mobility at time of Discharge:   Basic Mobility Inpatient Raw Score: 18  JH-HLM Goal: 6: Walk 10 steps or more  JH-HLM Achieved: 3: Sit at edge of bed  HLM Goal NOT achieved. Continue to encourage mobility in post discharge setting.     Disposition:   Home with [facility] Services (Reminder: Complete face to face encounter)    Planned Readmission: None      Discharge Statement:  I spent 60 minutes discharging the patient. This time was spent on the day of discharge. I had direct contact with the patient on the day of discharge. Greater than 50% of the total time was spent examining patient, answering all patient questions, arranging and discussing plan of care with patient as well as directly providing post-discharge instructions.  Additional time then spent on discharge activities.    Discharge Medications:  See after visit summary for reconciled discharge medications provided to patient and/or family.      **Please Note: This note may have been constructed using a voice recognition system**

## 2024-04-15 LAB
ERYTHROCYTE [DISTWIDTH] IN BLOOD BY AUTOMATED COUNT: 12.9 % (ref 11.6–15.1)
HCT VFR BLD AUTO: 37.5 % (ref 36.5–49.3)
HGB BLD-MCNC: 12.3 G/DL (ref 12–17)
MCH RBC QN AUTO: 31.1 PG (ref 26.8–34.3)
MCHC RBC AUTO-ENTMCNC: 32.8 G/DL (ref 31.4–37.4)
MCV RBC AUTO: 95 FL (ref 82–98)
PLATELET # BLD AUTO: 225 THOUSANDS/UL (ref 149–390)
PMV BLD AUTO: 9.7 FL (ref 8.9–12.7)
RBC # BLD AUTO: 3.95 MILLION/UL (ref 3.88–5.62)
WBC # BLD AUTO: 6.59 THOUSAND/UL (ref 4.31–10.16)

## 2024-04-15 PROCEDURE — 99233 SBSQ HOSP IP/OBS HIGH 50: CPT

## 2024-04-15 PROCEDURE — 92610 EVALUATE SWALLOWING FUNCTION: CPT

## 2024-04-15 PROCEDURE — 85027 COMPLETE CBC AUTOMATED: CPT | Performed by: INTERNAL MEDICINE

## 2024-04-15 RX ADMIN — ENOXAPARIN SODIUM 40 MG: 100 INJECTION SUBCUTANEOUS at 10:13

## 2024-04-15 RX ADMIN — ATORVASTATIN CALCIUM 40 MG: 40 TABLET, FILM COATED ORAL at 10:44

## 2024-04-15 RX ADMIN — TRAZODONE HYDROCHLORIDE 100 MG: 50 TABLET ORAL at 22:07

## 2024-04-15 RX ADMIN — Medication 6 MG: at 22:07

## 2024-04-15 RX ADMIN — LEVETIRACETAM 500 MG: 500 TABLET, FILM COATED ORAL at 22:07

## 2024-04-15 RX ADMIN — CILOSTAZOL 100 MG: 50 TABLET ORAL at 16:45

## 2024-04-15 RX ADMIN — TICAGRELOR 90 MG: 90 TABLET ORAL at 22:07

## 2024-04-15 RX ADMIN — PANTOPRAZOLE SODIUM 40 MG: 40 TABLET, DELAYED RELEASE ORAL at 10:13

## 2024-04-15 RX ADMIN — LEVETIRACETAM 500 MG: 500 TABLET, FILM COATED ORAL at 10:13

## 2024-04-15 RX ADMIN — TICAGRELOR 90 MG: 90 TABLET ORAL at 10:44

## 2024-04-15 RX ADMIN — DONEPEZIL HYDROCHLORIDE 10 MG: 5 TABLET ORAL at 10:13

## 2024-04-15 RX ADMIN — CILOSTAZOL 100 MG: 50 TABLET ORAL at 05:14

## 2024-04-15 RX ADMIN — TAMSULOSIN HYDROCHLORIDE 0.4 MG: 0.4 CAPSULE ORAL at 16:45

## 2024-04-15 RX ADMIN — METOPROLOL SUCCINATE 100 MG: 50 TABLET, EXTENDED RELEASE ORAL at 10:13

## 2024-04-15 RX ADMIN — LOSARTAN POTASSIUM 50 MG: 50 TABLET, FILM COATED ORAL at 10:13

## 2024-04-15 RX ADMIN — ASPIRIN 81 MG: 81 TABLET, COATED ORAL at 10:13

## 2024-04-15 NOTE — PLAN OF CARE
Problem: Potential for Falls  Goal: Patient will remain free of falls  Description: INTERVENTIONS:  - Educate patient/family on patient safety including physical limitations  - Instruct patient to call for assistance with activity   - Consult OT/PT to assist with strengthening/mobility   - Keep Call bell within reach  - Keep bed low and locked with side rails adjusted as appropriate  - Keep care items and personal belongings within reach  - Initiate and maintain comfort rounds  - Make Fall Risk Sign visible to staff  - Apply yellow socks and bracelet for high fall risk patients  - Consider moving patient to room near nurses station  Outcome: Progressing     Problem: Prexisting or High Potential for Compromised Skin Integrity  Goal: Skin integrity is maintained or improved  Description: INTERVENTIONS:  - Identify patients at risk for skin breakdown  - Assess and monitor skin integrity  - Assess and monitor nutrition and hydration status  - Monitor labs   - Assess for incontinence   - Turn and reposition patient  - Assist with mobility/ambulation  - Relieve pressure over bony prominences  - Avoid friction and shearing  - Provide appropriate hygiene as needed including keeping skin clean and dry  - Evaluate need for skin moisturizer/barrier cream  - Collaborate with interdisciplinary team   - Patient/family teaching  - Consider wound care consult   Outcome: Progressing     Problem: NEUROSENSORY - ADULT  Goal: Achieves stable or improved neurological status  Description: INTERVENTIONS  - Monitor and report changes in neurological status  - Monitor vital signs such as temperature, blood pressure, glucose, and any other labs ordered   - Initiate measures to prevent increased intracranial pressure  - Monitor for seizure activity and implement precautions if appropriate      Outcome: Progressing

## 2024-04-15 NOTE — CASE MANAGEMENT
Case Management Assessment & Discharge Planning Note    Patient name Constanza Zhu  Location /-01 MRN [id number]  : 1960 Date 4/15/2024       Current Admission Date: 2024  Current Admission Diagnosis:Acute CVA (cerebrovascular accident) ([facility])   Patient Active Problem List    Diagnosis Date Noted    Acute CVA (cerebrovascular accident) ([facility]) 2024    COVID 2024    Internal carotid artery stent present 2024    Vision problem 2024    Dry eyes 2024    Focal epilepsy ([facility]) 2023    Claudication of both lower extremities ([facility]) 2023    Type 2 diabetes mellitus with other diabetic ophthalmic complication ([facility]) 2023    Aphasia 2023    Atherosclerosis of native arteries of extremities with intermittent claudication, bilateral legs ([facility]) 2023    Benign prostatic hyperplasia with lower urinary tract symptoms 2023    Possible NPH (normal pressure hydrocephalus) ([facility]) 2023    Muscle spasms of both lower extremities 2023    Multiple thyroid nodules 2023    Abnormal laboratory test 05/15/2023    History of tobacco use 2023    Chronic ischemic left MCA stroke 2023    Infrarenal abdominal aortic aneurysm (AAA) without rupture ([facility]) 2023    Tachycardia 2023    Prediabetes 2023    SIRS (systemic inflammatory response syndrome) ([facility]) 2023    Chronic anticoagulation 2023    Recurrent strokes ([facility]) 2023    Hyponatremia 2023    Middle cerebral artery stenosis, right 2023    Left posterior MCA stroke - etiology unclear at this time 2023    Chronic low back pain 2023    Hypertensive encephalopathy, transient 2023    Snoring 08/10/2020    Memory deficits 08/10/2020    Status post placement of implantable loop recorder 2019    History of prediabetes 2019    Anxiety and depression 2019    Insomnia 2019    Fall 2019     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) 03/28/2019    Urinary retention 03/27/2019    At risk for venous thromboembolism (VTE) 03/26/2019    Hypertriglyceridemia 03/26/2019    Nicotine dependence 03/26/2019    Carotid stenosis, bilateral 03/26/2019    Presbyopia 03/26/2019    History of stroke 03/19/2019    Headache 03/19/2019    Primary hypertension 03/19/2019    GERD (gastroesophageal reflux disease) 03/19/2019      LOS (days): 3  Geometric Mean LOS (GMLOS) (days): 2.6  Days to GMLOS:-1     OBJECTIVE:    Risk of Unplanned Readmission Score: 20.91      Current admission status: Inpatient    Preferred Pharmacy:   [id number] - [place], [place] - [address]  [address] [address]  Phone: [phone] Fax: [phone]    [facility] - [place], [place] - [address]  [address] [address]  Phone: [phone] Fax: [phone]    Primary Care Provider: ERIC Calixto    Primary Insurance: BLUE CROSS  Secondary Insurance: MEDICARE    ASSESSMENT:  Active Health Care Proxies        Social Determinants of Health (SDOH)      Flowsheet Row Most Recent Value   Housing Stability    In the last 12 months, was there a time when you were not able to pay the mortgage or rent on time? N   In the last 12 months, how many places have you lived? 1     DISCHARGE DETAILS:    Discharge planning discussed with:: pt and his son     CM contacted family/caregiver?: Yes  Were Treatment Team discharge recommendations reviewed with patient/caregiver?: Yes  Did patient/caregiver verbalize understanding of patient care needs?: Yes  Were patient/caregiver advised of the risks associated with not following Treatment Team discharge recommendations?: Yes    Requested Home Health Care         Is the patient interested in HHC at discharge?: Yes  Home Health Discipline requested:: Nursing, Occupational Therapy, Physical Therapy  Home Health  Agency Name:: [facility]  HHA External Referral Reason (only applicable if external HHA name selected): Patient has established relationship with provider  Home Health Follow-Up Provider:: PCP  Home Health Services Needed:: Evaluate Functional Status and Safety, Strengthening/Theraputic Exercises to Improve Function, Gait/ADL Training  Homebound Criteria Met:: Requires the Assistance of Another Person for Safe Ambulation or to Leave the Home, Uses an Assist Device (i.e. cane, walker, etc)  Supporting Clincal Findings:: Limited Endurance, Fatigues Easliy in Short Distances    DME Referral Provided  Referral made for DME?: No    Other Referral/Resources/Interventions Provided:  Interventions: [facility]    Treatment Team Recommendation: Home with Home Health Care  Discharge Destination Plan:: Home with Home Health Care  Transport at Discharge : Family     IMM Given (Date):: 04/15/24  IMM Given to:: Patient  Family notified:: Reviewed with pt.

## 2024-04-15 NOTE — SPEECH THERAPY NOTE
Speech Language/Pathology    Speech-Language Pathology Bedside Swallow Evaluation      Patient Name: Constanza Zhu    Today's Date: 4/15/2024     Problem List  Principal Problem:    Acute CVA (cerebrovascular accident) (HCC)  Active Problems:    Primary hypertension    Urinary retention    Recurrent strokes (HCC)    Focal epilepsy (HCC)      Past Medical History  Past Medical History:   Diagnosis Date    Depression     Diabetes mellitus (HCC)     patient denies    Dyslipidemia 03/26/2019    GERD (gastroesophageal reflux disease)     Hyperlipidemia     Hypertension     Prediabetes     Prediabetes 04/03/2019    Stroke (HCC)        Past Surgical History  Past Surgical History:   Procedure Laterality Date    ARTHROSCOPIC REPAIR ACL Left     BACK SURGERY      twice    CARPAL TUNNEL RELEASE Right     IR CEREBRAL ANGIOGRAPHY  05/04/2023    IR STROKE ALERT  03/19/2019    SHOULDER SURGERY Left     US GUIDED THYROID BIOPSY  11/21/2023       Summary   Pt presented with functional appearing oral and pharyngeal stage swallowing skills with materials administered today. Mastication and oral manipulation is mildly slowed though ultimately functional. No significant oral residue. Swallows suspected timely. No overt s/s aspiration. Pt reports speech/language deficits are at baseline.         Recommended Diet: regular diet and thin liquids   Recommended Form of Meds: whole with liquid   Aspiration precautions and swallowing strategies: upright posture and only feed when fully alert  Other Recommendations: Continue frequent oral care        Current Medical Status  Pt is a 64 y.o. male who presented to  [facility]  with  a PMH of significant recurrent CVAs with chronic left-sided weakness and speech difficulty, HTN, HLD, focal epilepsy, urinary retention presented to the ED from home via EMS w/ generalized weakness.  Patient received COVID-vaccine yesterday, and today is noted significantly weak. He usually ambulated w/ a  "walker, however had significant difficulty transferring into a wheelchair today as well as getting up from the toilet.  In the ED, CTA head/neck without any new findings. Labs unremarkable.  On exam, All 4 extremities without drift.  5/5 strength right upper extremity and bilateral lower extremities.  4+/5 strength left upper extremity.    Admitted as Inpatient for evaluation and treatment of generalized weakness.  Plan: Will hold off on stroke pathway for now. PT/OT eval. Reevaluate, if continued difficulty, Consider neurology consult      Date: 04/13   Day 2:   No acute events overnight.  Patient reports his speech seems to improved today, however had more difficulty getting out of bed. Cont PT/OT. MRI brain pending.Continue statin, aspirin and Brilinta.   PE: aaox3, Speech difficulties improving, noted to have difficulty transferring from bed today     Current Precautions:    Allergies:  No known food allergies    Past medical history:  Please see H&P for details    Special Studies:  MRI 4/13: Advanced diffuse chronic microangiopathic change. Encephalomalacia and gliosis within the left posterior frontal and anterior parietal lobe with chronic hemosiderin deposition.     Small focus of restricted diffusion within the left posterior lateral temporal lobe on series 3 image 12 appears new compared to the prior examination suggesting a small acute infarct.     This examination was marked \"immediate notification\" in Epic in order to begin the standard process by which the radiology reading room liaison alerts the referring practitioner.    CTA head and neck 4/12:   No acute intracranial abnormality. Chronic ischemic changes     No acute large vessel occlusion or dissection.     70% stenosis proximal right cervical ICA     Chronic severe stenosis of the distal right M1 at site of previously seen chronic occlusion.     Additional sites of atherosclerosis, as described.      Social/Education/Vocational Hx:  Pt lives with " family    Swallow Information   Current Risks for Dysphagia & Aspiration: CVA and known history of dysphagia  Current Symptoms/Concerns:  poor PO intake  Current Diet: regular diet and thin liquids   Baseline Diet: regular diet and thin liquids      Baseline Assessment   Behavior/Cognition: alert  Speech/Language Status: able to participate in conversation, able to follow commands, and mild dysarthria - pt reports speech/language is at baseline deficit level   Patient Positioning: upright in bed  Pain Status/Interventions/Response to Interventions:  No report of or nonverbal indications of pain.       Swallow Mechanism Exam  Facial: symmetrical  Labial: WFL  Lingual: WFL  Velum: symmetrical  Mandible: adequate ROM  Dentition: adequate  Vocal quality:clear/adequate   Volitional Cough: strong/productive   Respiratory Status: on RA      Consistencies Assessed and Performance   Consistencies Administered: thin liquids, puree, soft solids, and hard solids      Oral Stage: WFL  Mastication was adequate with the materials administered today.  Bolus formation and transfer were functional with no significant oral residue noted.  No overt s/s reduced oral control.    Pharyngeal Stage: WFL  Swallow Mechanics:  Swallowing initiation appeared prompt.  Laryngeal rise was palpated and judged to be within functional limits.  No coughing, throat clearing, change in vocal quality or respiratory status noted today.     Esophageal Concerns:  reflux reported    Strategies and Efficacy: -    Summary and Recommendations (see above)    Results Reviewed with: patient and RN     Treatment Recommended: Not at this time, evaluation only. Please re-consult if medically indicated.

## 2024-04-15 NOTE — PROGRESS NOTES
[facility]  Progress Note  Name: Constanza Zhu I  MRN: [id number]  Unit/Bed#: -[facility] Date of Admission: 4/11/2024   Date of Service: 4/15/2024 I Hospital Day: 3    Assessment/Plan   * Acute CVA (cerebrovascular accident) (HCC)  Assessment & Plan  Presents due to generalized weakness following COVID vaccination + acute on chronic speech difficulty secondary to prior CVA  CTA head/neck- No acute intracranial abnormality. Chronic ischemic changes. No acute large vessel occlusion or dissection. 70% stenosis proximal right cervical ICA. Chronic severe stenosis of the distal right M1 at site of previously seen chronic occlusion.  Neurology consulted   MRI brain: Small focus of restricted diffusion within the left posterior lateral temporal lobe on series 3 image 12 appears new compared to the prior examination suggesting a small acute infarct.   Continue aspirin/brilinta  Pletal added   TTE pending   Monitor tele for A-fib/flutter.  If patient develops A-fib/flutter, will need to transition to anticoagulant.  PT/OT eval -cleared to return home with [facility]    Focal epilepsy ([facility])  Assessment & Plan  Continue Keppra    Recurrent strokes ([facility])  Assessment & Plan  History of recurrent strokes  Continue statin, aspirin and Brilinta    Urinary retention  Assessment & Plan  Continue Flomax    Primary hypertension  Assessment & Plan  Continue losartan metoprolol               VTE Pharmacologic Prophylaxis: VTE Score: 3 Moderate Risk (Score 3-4) - Pharmacological DVT Prophylaxis Ordered: enoxaparin (Lovenox).    Mobility:   Basic Mobility Inpatient Raw Score: 18  JH-HLM Goal: 6: Walk 10 steps or more  JH-HLM Achieved: 6: Walk 10 steps or more  JH-HLM Goal achieved. Continue to encourage appropriate mobility.    Patient Centered Rounds: I performed bedside rounds with nursing staff today.   Discussions with Specialists or Other Care Team Provider: Neuro     Education and Discussions with Family /  Patient: Updated  (son) via phone.    Total Time Spent on Date of Encounter in care of patient: 30 mins. This time was spent on one or more of the following: performing physical exam; counseling and coordination of care; obtaining or reviewing history; documenting in the medical record; reviewing/ordering tests, medications or procedures; communicating with other healthcare professionals and discussing with patient's family/caregivers.    Current Length of Stay: 3 day(s)  Current Patient Status: Inpatient   Certification Statement: The patient will continue to require additional inpatient hospital stay due to TTE pending   Discharge Plan: Anticipate discharge tomorrow to home with home services.    Code Status: Level 1 - Full Code    Subjective:   Patient denies any new complaints. Oriented to place and self but not time. Mild persistent aphasia.     Objective:     Vitals:   Temp (24hrs), Av °F (36.7 °C), Min:97.9 °F (36.6 °C), Max:98.2 °F (36.8 °C)    Temp:  [97.9 °F (36.6 °C)-98.2 °F (36.8 °C)] 98.2 °F (36.8 °C)  HR:  [56-84] 80  Resp:  [18-20] 20  BP: (107-150)/(54-63) 142/63  SpO2:  [94 %-98 %] 97 %  Body mass index is 30 kg/m².     Input and Output Summary (last 24 hours):     Intake/Output Summary (Last 24 hours) at 4/15/2024 1653  Last data filed at 4/15/2024 0501  Gross per 24 hour   Intake 0 ml   Output --   Net 0 ml       Physical Exam:   Physical Exam  Vitals and nursing note reviewed.   Constitutional:       General: He is not in acute distress.     Appearance: He is well-developed. He is ill-appearing.   HENT:      Head: Normocephalic and atraumatic.   Eyes:      General:         Right eye: No discharge.         Left eye: No discharge.      Extraocular Movements: Extraocular movements intact.      Conjunctiva/sclera: Conjunctivae normal.   Cardiovascular:      Rate and Rhythm: Normal rate and regular rhythm.      Heart sounds: No murmur heard.  Pulmonary:      Effort: Pulmonary effort  is normal. No respiratory distress.      Breath sounds: Normal breath sounds. No wheezing, rhonchi or rales.   Abdominal:      General: Bowel sounds are normal. There is no distension.      Palpations: Abdomen is soft.      Tenderness: There is no abdominal tenderness.   Musculoskeletal:      Cervical back: Neck supple.      Right lower leg: No edema.      Left lower leg: No edema.   Skin:     General: Skin is warm and dry.      Capillary Refill: Capillary refill takes less than 2 seconds.   Neurological:      Mental Status: He is alert. Mental status is at baseline.      Cranial Nerves: No cranial nerve deficit.      Comments: Mild expressive aphasia/slurred speech   Psychiatric:         Mood and Affect: Mood normal.         Behavior: Behavior normal.          Additional Data:     Labs:  Results from last 7 days   Lab Units 04/15/24  0518 04/14/24  0504 04/12/24  1040   WBC Thousand/uL 6.59   < > 5.13   HEMOGLOBIN g/dL 12.3   < > 12.4   HEMATOCRIT % 37.5   < > 37.4   PLATELETS Thousands/uL 225   < > 211   SEGS PCT %  --   --  64   LYMPHO PCT %  --   --  18   MONO PCT %  --   --  16*   EOS PCT %  --   --  1    < > = values in this interval not displayed.     Results from last 7 days   Lab Units 04/13/24  0447 04/12/24  1040 04/12/24  0006   SODIUM mmol/L 139   < > 136   POTASSIUM mmol/L 3.7   < > 3.4*   CHLORIDE mmol/L 109*   < > 102   CO2 mmol/L 22   < > 26   BUN mg/dL 24   < > 24   CREATININE mg/dL 0.96   < > 0.96   ANION GAP mmol/L 8   < > 8   CALCIUM mg/dL 8.5   < > 8.9   ALBUMIN g/dL  --   --  3.9   TOTAL BILIRUBIN mg/dL  --   --  0.61   ALK PHOS U/L  --   --  92   ALT U/L  --   --  17   AST U/L  --   --  16   GLUCOSE RANDOM mg/dL 105   < > 95    < > = values in this interval not displayed.             Results from last 7 days   Lab Units 04/14/24  0504   HEMOGLOBIN A1C % 5.3           Lines/Drains:  Invasive Devices       Peripheral Intravenous Line  Duration             Peripheral IV 04/12/24 Right  Antecubital 3 days                        Imaging: Reviewed radiology reports from this admission including: MRI brain    Recent Cultures (last 7 days):         Last 24 Hours Medication List:   Current Facility-Administered Medications   Medication Dose Route Frequency Provider Last Rate    acetaminophen  650 mg Oral Q6H PRN Dakota Tejada PA-C      aluminum-magnesium hydroxide-simethicone  30 mL Oral Q6H PRN Dakota Tejada PA-C      aspirin  81 mg Oral Daily Dakota Tejada PA-C      atorvastatin  40 mg Oral Daily With Breakfast Dakota Tejada PA-C      baclofen  5 mg Oral BID PRN Dakota Tejada PA-C      cilostazol  100 mg Oral BID AC Nasima Devarinmaris, DO      donepezil  10 mg Oral Daily Dakota Tejada PA-C      enoxaparin  40 mg Subcutaneous Daily Dakota Tejada PA-C      levETIRAcetam  500 mg Oral BID Dakota Tejada PA-C      losartan  50 mg Oral Daily Dakota Tejada PA-C      melatonin  6 mg Oral HS Dakota Tejada PA-C      metoprolol succinate  100 mg Oral Daily Dakota Tejada PA-C      pantoprazole  40 mg Oral Daily Dakota Tejada PA-C      tamsulosin  0.4 mg Oral Daily With Dinner Dakota Tejada PA-C      ticagrelor  90 mg Oral Q12H JOSE Dakota Tejada PA-C      traZODone  100 mg Oral HS Dakota Tejada PA-C          Today, Patient Was Seen By: Sepideh Aldrich PA-C    **Please Note: This note may have been constructed using a voice recognition system.**

## 2024-04-15 NOTE — PLAN OF CARE
Problem: Potential for Falls  Goal: Patient will remain free of falls  Description: INTERVENTIONS:  - Educate patient/family on patient safety including physical limitations  - Instruct patient to call for assistance with activity   - Consult OT/PT to assist with strengthening/mobility   - Keep Call bell within reach  - Keep bed low and locked with side rails adjusted as appropriate  - Keep care items and personal belongings within reach  - Initiate and maintain comfort rounds  - Make Fall Risk Sign visible to staff  - Offer Toileting every x Hours, in advance of need  - Initiate/Maintain xalarm  - Obtain necessary fall risk management equipment: xx  - Apply yellow socks and bracelet for high fall risk patients  - Consider moving patient to room near nurses station  Outcome: Progressing     Problem: Prexisting or High Potential for Compromised Skin Integrity  Goal: Skin integrity is maintained or improved  Description: INTERVENTIONS:  - Identify patients at risk for skin breakdown  - Assess and monitor skin integrity  - Assess and monitor nutrition and hydration status  - Monitor labs   - Assess for incontinence   - Turn and reposition patient  - Assist with mobility/ambulation  - Relieve pressure over bony prominences  - Avoid friction and shearing  - Provide appropriate hygiene as needed including keeping skin clean and dry  - Evaluate need for skin moisturizer/barrier cream  - Collaborate with interdisciplinary team   - Patient/family teaching  - Consider wound care consult   Outcome: Progressing     Problem: NEUROSENSORY - ADULT  Goal: Achieves stable or improved neurological status  Description: INTERVENTIONS  - Monitor and report changes in neurological status  - Monitor vital signs such as temperature, blood pressure, glucose, and any other labs ordered   - Initiate measures to prevent increased intracranial pressure  - Monitor for seizure activity and implement precautions if appropriate      Outcome:  Progressing     Problem: Nutrition/Hydration-ADULT  Goal: Nutrient/Hydration intake appropriate for improving, restoring or maintaining nutritional needs  Description: Monitor and assess patient's nutrition/hydration status for malnutrition. Collaborate with interdisciplinary team and initiate plan and interventions as ordered.  Monitor patient's weight and dietary intake as ordered or per policy. Utilize nutrition screening tool and intervene as necessary. Determine patient's food preferences and provide high-protein, high-caloric foods as appropriate.     INTERVENTIONS:  - Monitor oral intake, urinary output, labs, and treatment plans  - Assess nutrition and hydration status and recommend course of action  - Evaluate amount of meals eaten  - Assist patient with eating if necessary   - Allow adequate time for meals  - Recommend/ encourage appropriate diets, oral nutritional supplements, and vitamin/mineral supplements  - Order, calculate, and assess calorie counts as needed  - Recommend, monitor, and adjust tube feedings and TPN/PPN based on assessed needs  - Assess need for intravenous fluids  - Provide specific nutrition/hydration education as appropriate  - Include patient/family/caregiver in decisions related to nutrition  Outcome: Progressing

## 2024-04-16 ENCOUNTER — APPOINTMENT (INPATIENT)
Dept: NON INVASIVE DIAGNOSTICS | Facility: HOSPITAL | Age: 64
DRG: 065 | End: 2024-04-16
Payer: COMMERCIAL

## 2024-04-16 VITALS
HEIGHT: 68 IN | TEMPERATURE: 98.5 F | SYSTOLIC BLOOD PRESSURE: 166 MMHG | OXYGEN SATURATION: 97 % | HEART RATE: 84 BPM | DIASTOLIC BLOOD PRESSURE: 81 MMHG | WEIGHT: 197 LBS | RESPIRATION RATE: 18 BRPM | BODY MASS INDEX: 29.86 KG/M2

## 2024-04-16 PROBLEM — I65.22 INTERNAL CAROTID ARTERY STENOSIS, LEFT: Status: ACTIVE | Noted: 2024-04-16

## 2024-04-16 LAB
ANION GAP SERPL CALCULATED.3IONS-SCNC: 8 MMOL/L (ref 4–13)
AORTIC ROOT: 3.8 CM
AORTIC VALVE MEAN VELOCITY: 17.2 M/S
APICAL FOUR CHAMBER EJECTION FRACTION: 56 %
AV AREA BY CONTINUOUS VTI: 2.2 CM2
AV AREA PEAK VELOCITY: 1.8 CM2
AV LVOT MEAN GRADIENT: 2 MMHG
AV LVOT PEAK GRADIENT: 4 MMHG
AV MEAN GRADIENT: 14 MMHG
AV PEAK GRADIENT: 26 MMHG
AV VALVE AREA: 2.16 CM2
AV VELOCITY RATIO: 0.4
BASOPHILS # BLD MANUAL: 0 THOUSAND/UL (ref 0–0.1)
BASOPHILS NFR MAR MANUAL: 0 % (ref 0–1)
BSA FOR ECHO PROCEDURE: 2.03 M2
BUN SERPL-MCNC: 25 MG/DL (ref 5–25)
CALCIUM SERPL-MCNC: 9 MG/DL (ref 8.4–10.2)
CHLORIDE SERPL-SCNC: 108 MMOL/L (ref 96–108)
CO2 SERPL-SCNC: 26 MMOL/L (ref 21–32)
CREAT SERPL-MCNC: 0.98 MG/DL (ref 0.6–1.3)
DOP CALC AO PEAK VEL: 2.57 M/S
DOP CALC AO VTI: 44.9 CM
DOP CALC LVOT AREA: 4.52 CM2
DOP CALC LVOT CARDIAC INDEX: 4.38 L/MIN/M2
DOP CALC LVOT CARDIAC OUTPUT: 8.89 L/MIN
DOP CALC LVOT DIAMETER: 2.4 CM
DOP CALC LVOT PEAK VEL VTI: 21.44 CM
DOP CALC LVOT PEAK VEL: 1.04 M/S
DOP CALC LVOT STROKE INDEX: 45.8 ML/M2
DOP CALC LVOT STROKE VOLUME: 96.94
E WAVE DECELERATION TIME: 207 MS
E/A RATIO: 0.53
EOSINOPHIL # BLD MANUAL: 0.08 THOUSAND/UL (ref 0–0.4)
EOSINOPHIL NFR BLD MANUAL: 1 % (ref 0–6)
ERYTHROCYTE [DISTWIDTH] IN BLOOD BY AUTOMATED COUNT: 12.9 % (ref 11.6–15.1)
FRACTIONAL SHORTENING: 31 (ref 28–44)
GFR SERPL CREATININE-BSD FRML MDRD: 80 ML/MIN/1.73SQ M
GLUCOSE SERPL-MCNC: 109 MG/DL (ref 65–140)
HCT VFR BLD AUTO: 41.3 % (ref 36.5–49.3)
HGB BLD-MCNC: 13.6 G/DL (ref 12–17)
INTERVENTRICULAR SEPTUM IN DIASTOLE (PARASTERNAL SHORT AXIS VIEW): 1.2 CM
INTERVENTRICULAR SEPTUM: 1.2 CM (ref 0.6–1.1)
LAAS-AP2: 32.1 CM2
LAAS-AP4: 26.1 CM2
LEFT ATRIUM SIZE: 4.4 CM
LEFT ATRIUM VOLUME (MOD BIPLANE): 96 ML
LEFT ATRIUM VOLUME INDEX (MOD BIPLANE): 47.3 ML/M2
LEFT INTERNAL DIMENSION IN SYSTOLE: 3.4 CM (ref 2.1–4)
LEFT VENTRICLE DIASTOLIC VOLUME (MOD BIPLANE): 121 ML
LEFT VENTRICLE DIASTOLIC VOLUME INDEX (MOD BIPLANE): 59.6 ML/M2
LEFT VENTRICLE SYSTOLIC VOLUME (MOD BIPLANE): 49 ML
LEFT VENTRICLE SYSTOLIC VOLUME INDEX (MOD BIPLANE): 24.1 ML/M2
LEFT VENTRICULAR INTERNAL DIMENSION IN DIASTOLE: 4.9 CM (ref 3.5–6)
LEFT VENTRICULAR POSTERIOR WALL IN END DIASTOLE: 1.2 CM
LEFT VENTRICULAR STROKE VOLUME: 65 ML
LV EF: 59 %
LVSV (TEICH): 65 ML
LYMPHOCYTES # BLD AUTO: 2.54 THOUSAND/UL (ref 0.6–4.47)
LYMPHOCYTES # BLD AUTO: 32 % (ref 14–44)
MCH RBC QN AUTO: 31.4 PG (ref 26.8–34.3)
MCHC RBC AUTO-ENTMCNC: 32.9 G/DL (ref 31.4–37.4)
MCV RBC AUTO: 95 FL (ref 82–98)
MONOCYTES # BLD AUTO: 0.31 THOUSAND/UL (ref 0–1.22)
MONOCYTES NFR BLD: 4 % (ref 4–12)
MV E'TISSUE VEL-LAT: 12 CM/S
MV E'TISSUE VEL-SEP: 8 CM/S
MV PEAK A VEL: 1.16 M/S
MV PEAK E VEL: 61 CM/S
MV STENOSIS PRESSURE HALF TIME: 60 MS
MV VALVE AREA P 1/2 METHOD: 3.67
NEUTROPHILS # BLD MANUAL: 4.77 THOUSAND/UL (ref 1.85–7.62)
NEUTS SEG NFR BLD AUTO: 62 % (ref 43–75)
PLATELET # BLD AUTO: 259 THOUSANDS/UL (ref 149–390)
PLATELET BLD QL SMEAR: ADEQUATE
PMV BLD AUTO: 10.2 FL (ref 8.9–12.7)
POTASSIUM SERPL-SCNC: 3.6 MMOL/L (ref 3.5–5.3)
RBC # BLD AUTO: 4.33 MILLION/UL (ref 3.88–5.62)
RBC MORPH BLD: NORMAL
RIGHT ATRIUM AREA SYSTOLE A4C: 16.7 CM2
RIGHT VENTRICLE ID DIMENSION: 2.8 CM
SL CV LEFT ATRIUM LENGTH A2C: 7.2 CM
SL CV LV EF: 65
SL CV PED ECHO LEFT VENTRICLE DIASTOLIC VOLUME (MOD BIPLANE) 2D: 111 ML
SL CV PED ECHO LEFT VENTRICLE SYSTOLIC VOLUME (MOD BIPLANE) 2D: 46 ML
SODIUM SERPL-SCNC: 142 MMOL/L (ref 135–147)
TRICUSPID ANNULAR PLANE SYSTOLIC EXCURSION: 2.3 CM
VARIANT LYMPHS # BLD AUTO: 1 %
WBC # BLD AUTO: 7.69 THOUSAND/UL (ref 4.31–10.16)

## 2024-04-16 PROCEDURE — 85007 BL SMEAR W/DIFF WBC COUNT: CPT

## 2024-04-16 PROCEDURE — 93306 TTE W/DOPPLER COMPLETE: CPT

## 2024-04-16 PROCEDURE — 80048 BASIC METABOLIC PNL TOTAL CA: CPT

## 2024-04-16 PROCEDURE — 93306 TTE W/DOPPLER COMPLETE: CPT | Performed by: INTERNAL MEDICINE

## 2024-04-16 PROCEDURE — 85027 COMPLETE CBC AUTOMATED: CPT

## 2024-04-16 PROCEDURE — 99239 HOSP IP/OBS DSCHRG MGMT >30: CPT

## 2024-04-16 RX ORDER — CILOSTAZOL 100 MG/1
100 TABLET ORAL
Qty: 60 TABLET | Refills: 0 | Status: SHIPPED | OUTPATIENT
Start: 2024-04-16 | End: 2024-05-16

## 2024-04-16 RX ADMIN — PANTOPRAZOLE SODIUM 40 MG: 40 TABLET, DELAYED RELEASE ORAL at 09:49

## 2024-04-16 RX ADMIN — LEVETIRACETAM 500 MG: 500 TABLET, FILM COATED ORAL at 09:49

## 2024-04-16 RX ADMIN — ENOXAPARIN SODIUM 40 MG: 100 INJECTION SUBCUTANEOUS at 09:49

## 2024-04-16 RX ADMIN — TICAGRELOR 90 MG: 90 TABLET ORAL at 09:51

## 2024-04-16 RX ADMIN — ATORVASTATIN CALCIUM 40 MG: 40 TABLET, FILM COATED ORAL at 09:51

## 2024-04-16 RX ADMIN — DONEPEZIL HYDROCHLORIDE 10 MG: 5 TABLET ORAL at 09:49

## 2024-04-16 RX ADMIN — LOSARTAN POTASSIUM 50 MG: 50 TABLET, FILM COATED ORAL at 09:49

## 2024-04-16 RX ADMIN — PERFLUTREN 0.6 ML/MIN: 6.52 INJECTION, SUSPENSION INTRAVENOUS at 13:40

## 2024-04-16 RX ADMIN — TAMSULOSIN HYDROCHLORIDE 0.4 MG: 0.4 CAPSULE ORAL at 17:31

## 2024-04-16 RX ADMIN — CILOSTAZOL 100 MG: 50 TABLET ORAL at 17:32

## 2024-04-16 RX ADMIN — ASPIRIN 81 MG: 81 TABLET, COATED ORAL at 09:49

## 2024-04-16 RX ADMIN — METOPROLOL SUCCINATE 100 MG: 50 TABLET, EXTENDED RELEASE ORAL at 09:49

## 2024-04-16 RX ADMIN — CILOSTAZOL 100 MG: 50 TABLET ORAL at 06:27

## 2024-04-16 NOTE — ASSESSMENT & PLAN NOTE
"LICA stenosis S/P angioplasty in 2023 with neurosurgery Dr Mattson   Per neurology: \"CTA head and neck with overall stable right proximal ICA stenosis at 70% in comparison to prior notes and left ICA status post stent appears patent. He does have at least mild calcified atherosclerotic plaque in the supraclinoid ICA as well on the right.\"   Neurology discussed case with Dr Mattson - no neurosurgical interventions necessary  Agree with adding pletal to prior regimen as above   Continue outpatient follow up   "

## 2024-04-16 NOTE — PLAN OF CARE
Problem: Potential for Falls  Goal: Patient will remain free of falls  Description: INTERVENTIONS:  - Educate patient/family on patient safety including physical limitations  - Instruct patient to call for assistance with activity   - Consult OT/PT to assist with strengthening/mobility   - Keep Call bell within reach  - Keep bed low and locked with side rails adjusted as appropriate  - Keep care items and personal belongings within reach  - Initiate and maintain comfort rounds  - Make Fall Risk Sign visible to staff  - Apply yellow socks and bracelet for high fall risk patients  - Consider moving patient to room near nurses station  Outcome: Progressing     Problem: Prexisting or High Potential for Compromised Skin Integrity  Goal: Skin integrity is maintained or improved  Description: INTERVENTIONS:  - Identify patients at risk for skin breakdown  - Assess and monitor skin integrity  - Assess and monitor nutrition and hydration status  - Monitor labs   - Assess for incontinence   - Turn and reposition patient  - Assist with mobility/ambulation  - Relieve pressure over bony prominences  - Avoid friction and shearing  - Provide appropriate hygiene as needed including keeping skin clean and dry  - Evaluate need for skin moisturizer/barrier cream  - Collaborate with interdisciplinary team   - Patient/family teaching  - Consider wound care consult   Outcome: Progressing     Problem: NEUROSENSORY - ADULT  Goal: Achieves stable or improved neurological status  Description: INTERVENTIONS  - Monitor and report changes in neurological status  - Monitor vital signs such as temperature, blood pressure, glucose, and any other labs ordered   - Initiate measures to prevent increased intracranial pressure  - Monitor for seizure activity and implement precautions if appropriate      Outcome: Progressing     Problem: Nutrition/Hydration-ADULT  Goal: Nutrient/Hydration intake appropriate for improving, restoring or maintaining  nutritional needs  Description: Monitor and assess patient's nutrition/hydration status for malnutrition. Collaborate with interdisciplinary team and initiate plan and interventions as ordered.  Monitor patient's weight and dietary intake as ordered or per policy. Utilize nutrition screening tool and intervene as necessary. Determine patient's food preferences and provide high-protein, high-caloric foods as appropriate.     INTERVENTIONS:  - Monitor oral intake, urinary output, labs, and treatment plans  - Assess nutrition and hydration status and recommend course of action  - Evaluate amount of meals eaten  - Assist patient with eating if necessary   - Allow adequate time for meals  - Recommend/ encourage appropriate diets, oral nutritional supplements, and vitamin/mineral supplements  - Order, calculate, and assess calorie counts as needed  - Recommend, monitor, and adjust tube feedings and TPN/PPN based on assessed needs  - Assess need for intravenous fluids  - Provide specific nutrition/hydration education as appropriate  - Include patient/family/caregiver in decisions related to nutrition  Outcome: Progressing

## 2024-04-16 NOTE — PLAN OF CARE
Problem: Potential for Falls  Goal: Patient will remain free of falls  Description: INTERVENTIONS:  - Educate patient/family on patient safety including physical limitations  - Instruct patient to call for assistance with activity   - Consult OT/PT to assist with strengthening/mobility   - Keep Call bell within reach  - Keep bed low and locked with side rails adjusted as appropriate  - Keep care items and personal belongings within reach  - Initiate and maintain comfort rounds  - Make Fall Risk Sign visible to staff  - Offer Toileting every 2 Hours, in advance of need  - Initiate/Maintain bed alarm  - Obtain necessary fall risk management equipment: socks  - Apply yellow socks and bracelet for high fall risk patients  - Consider moving patient to room near nurses station  Outcome: Progressing     Problem: Prexisting or High Potential for Compromised Skin Integrity  Goal: Skin integrity is maintained or improved  Description: INTERVENTIONS:  - Identify patients at risk for skin breakdown  - Assess and monitor skin integrity  - Assess and monitor nutrition and hydration status  - Monitor labs   - Assess for incontinence   - Turn and reposition patient  - Assist with mobility/ambulation  - Relieve pressure over bony prominences  - Avoid friction and shearing  - Provide appropriate hygiene as needed including keeping skin clean and dry  - Evaluate need for skin moisturizer/barrier cream  - Collaborate with interdisciplinary team   - Patient/family teaching  - Consider wound care consult   Outcome: Progressing     Problem: NEUROSENSORY - ADULT  Goal: Achieves stable or improved neurological status  Description: INTERVENTIONS  - Monitor and report changes in neurological status  - Monitor vital signs such as temperature, blood pressure, glucose, and any other labs ordered   - Initiate measures to prevent increased intracranial pressure  - Monitor for seizure activity and implement precautions if appropriate       Outcome: Progressing     Problem: Nutrition/Hydration-ADULT  Goal: Nutrient/Hydration intake appropriate for improving, restoring or maintaining nutritional needs  Description: Monitor and assess patient's nutrition/hydration status for malnutrition. Collaborate with interdisciplinary team and initiate plan and interventions as ordered.  Monitor patient's weight and dietary intake as ordered or per policy. Utilize nutrition screening tool and intervene as necessary. Determine patient's food preferences and provide high-protein, high-caloric foods as appropriate.     INTERVENTIONS:  - Monitor oral intake, urinary output, labs, and treatment plans  - Assess nutrition and hydration status and recommend course of action  - Evaluate amount of meals eaten  - Assist patient with eating if necessary   - Allow adequate time for meals  - Recommend/ encourage appropriate diets, oral nutritional supplements, and vitamin/mineral supplements  - Order, calculate, and assess calorie counts as needed  - Recommend, monitor, and adjust tube feedings and TPN/PPN based on assessed needs  - Assess need for intravenous fluids  - Provide specific nutrition/hydration education as appropriate  - Include patient/family/caregiver in decisions related to nutrition  Outcome: Progressing

## 2024-04-16 NOTE — DISCHARGE INSTR - AVS FIRST PAGE
Outpatient follow-up:  Please follow-up with outpatient PCP within 1 week of discharge for posthospital visit  Please follow-up with outpatient neurology within 6 weeks of discharge  Please follow-up with outpatient neurosurgery as routinely scheduled following prior internal carotid artery stent  Please follow-up with outpatient cardiology for routine follow-up    Medication:  Please begin taking Pletal 100 mg by mouth twice daily for stroke prevention along with previously prescribed aspirin/Brilinta which have not been changed  Please continue all previously prescribed home medications    Please return to the emergency room if you experience new fevers or chills, chest pain, difficulty breathing, numbness/ting/weakness, speech/visual changes, severe headache or dizziness, or any additional concerning symptoms.

## 2024-04-16 NOTE — ASSESSMENT & PLAN NOTE
Presents due to generalized weakness following COVID vaccination + acute on chronic speech difficulty secondary to prior CVA  CTA head/neck- No acute intracranial abnormality. Chronic ischemic changes. No acute large vessel occlusion or dissection. 70% stenosis proximal right cervical ICA. Chronic severe stenosis of the distal right M1 at site of previously seen chronic occlusion.  Neurology consulted   MRI brain: Small focus of restricted diffusion within the left posterior lateral temporal lobe on series 3 image 12 appears new compared to the prior examination suggesting a small acute infarct.   Continue aspirin/brilinta  Pletal added, continue on discharge   TTE: EF 65% no abnormal wall motion or PFO. Mild AS.   Telemetry without evidence of afib > 24 hours   Outpatient follow up with neurology within 4-6 weeks   PT/OT eval -cleared to return home with [facility]

## 2024-04-17 NOTE — UTILIZATION REVIEW
NOTIFICATION OF ADMISSION DISCHARGE   This is a Notification of Discharge from [facility]. Please be advised that this patient has been discharge from our facility. Below you will find the admission and discharge date and time including the patient’s disposition.   UTILIZATION REVIEW CONTACT:  Pilar Corea  Utilization   Network Utilization Review Department  Phone: [phone] carefully listen to the prompts. All voicemails are confidential.  Email: [email or web address]     ADMISSION INFORMATION  PRESENTATION DATE: 4/11/2024 10:59 PM  OBERVATION ADMISSION DATE:   INPATIENT ADMISSION DATE: 4/12/24  3:27 AM   DISCHARGE DATE: 4/16/2024  6:58 PM   DISPOSITION:Home with Home Health Care    Network Utilization Review Department  ATTENTION: Please call with any questions or concerns to [phone] and carefully listen to the prompts so that you are directed to the right person. All voicemails are confidential.   For Discharge needs, contact Care Management DC Support Team at [phone] opt. [phone]  Send all requests for admission clinical reviews, approved or denied determinations and any other requests to dedicated fax number below belonging to the campus where the patient is receiving treatment. List of dedicated fax numbers for the Facilities:  FACILITY NAME UR FAX NUMBER   ADMISSION DENIALS (Administrative/Medical Necessity) [phone]   DISCHARGE SUPPORT TEAM ([facility]) [phone]   PARENT CHILD HEALTH (Maternity/NICU/Pediatrics) [phone]   [facility] [phone]   [facility] [phone]   [facility] [phone]   [facility] [phone]   [facility] [phone]   [facility] [phone]   [facility] [phone]   [facility]  [phone]   [facility] [phone]   [facility] [phone]   [facility] [phone]   [facility] [phone]

## 2024-04-17 NOTE — CASE MANAGEMENT
Case Management Progress Note    Patient name Constanza Zhu  Location /-01 MRN [id number]  : 1960 Date 2024       LOS (days): 4  Geometric Mean LOS (GMLOS) (days): 2.6  Days to GMLOS:-2        OBJECTIVE:     Current admission status: Inpatient  Preferred Pharmacy:   [facility] - [address] [address]  Phone: [phone] Fax: [phone]    [facility] - [place], [place] - [facility]  [address] [place] [address]  Phone: [phone] Fax: [phone]    Primary Care Provider: ERIC Calixto    Primary Insurance: BLUE CROSS  Secondary Insurance: MEDICARE    PROGRESS NOTE:    Notification made to OP CM Handoff: [facility] OP CM regarding discharge planning and disposition.   Called and left message with summary of dc to  Ana at [facility].

## 2024-04-22 ENCOUNTER — TELEPHONE (OUTPATIENT)
Dept: NEUROLOGY | Facility: CLINIC | Age: 64
End: 2024-04-22

## 2024-04-23 ENCOUNTER — TELEPHONE (OUTPATIENT)
Age: 64
End: 2024-04-23

## 2024-04-23 ENCOUNTER — TELEPHONE (OUTPATIENT)
Dept: NEUROLOGY | Facility: CLINIC | Age: 64
End: 2024-04-23

## 2024-04-23 ENCOUNTER — TELEMEDICINE (OUTPATIENT)
Dept: NEUROLOGY | Facility: CLINIC | Age: 64
End: 2024-04-23
Payer: COMMERCIAL

## 2024-04-23 VITALS — BODY MASS INDEX: 29.86 KG/M2 | WEIGHT: 197 LBS | HEIGHT: 68 IN

## 2024-04-23 DIAGNOSIS — Z86.73 CHRONIC ISCHEMIC LEFT MCA STROKE: ICD-10-CM

## 2024-04-23 DIAGNOSIS — I65.22 LEFT CAROTID STENOSIS: Primary | ICD-10-CM

## 2024-04-23 DIAGNOSIS — I63.9 ACUTE STROKE DUE TO ISCHEMIA (HCC): ICD-10-CM

## 2024-04-23 DIAGNOSIS — Z95.828 INTERNAL CAROTID ARTERY STENT PRESENT: ICD-10-CM

## 2024-04-23 PROCEDURE — 99215 OFFICE O/P EST HI 40 MIN: CPT | Performed by: PSYCHIATRY & NEUROLOGY

## 2024-04-23 RX ORDER — HYDROCHLOROTHIAZIDE 12.5 MG/1
TABLET ORAL
COMMUNITY
Start: 2024-03-29

## 2024-04-23 RX ORDER — ALBUTEROL SULFATE 90 UG/1
AEROSOL, METERED RESPIRATORY (INHALATION)
COMMUNITY
Start: 2024-02-01

## 2024-04-23 RX ORDER — HYDRALAZINE HYDROCHLORIDE 25 MG/1
TABLET, FILM COATED ORAL
COMMUNITY
Start: 2024-01-29

## 2024-04-23 NOTE — PROGRESS NOTES
Virtual Brief Visit    This Visit is being completed by telephone. The Patient is located at Home and in the following state in which I hold an active license [place]    The patient was identified by name and date of birth. Constanza Zhu was informed that this is a telemedicine visit and that the visit is being conducted through the Epic Embedded platform. He agrees to proceed..  My office door was closed. No one else was in the room.  He acknowledged consent and understanding of privacy and security of the video platform. The patient has agreed to participate and understands they can discontinue the visit at any time.    Patient is aware this is a billable service.       Assessment/Plan:    Problem List Items Addressed This Visit          Cardiovascular and Mediastinum    Left carotid stenosis - Primary  -s/p left ICA stent which is patent per CTA Head and neck  -unclear etiology at this time  -refered patient to neurosurgery per inpatient team, although not sure if anything else can be done  -c/w aspirin, brilinta, pletal (new medication added) and atorvastatin    Relevant Orders    Ambulatory referral to Neurosurgery    Chronic ischemic left MCA stroke  for secondary stroke prevention, recommend continuation of combination of aspirin, Brilinta and pletal and atorvastatin  -he is currently getting PT, OT and ST twice a week  -LDL goal <70  -I advised patient to avoid using NSAIDs for headaches or other pain and to stick to tylenol if needed  -Recommend lifestyle modifications such as mediterranean diet & regular exercise regimen atleast 4-5 times a week for 20-30 minutes.   -I educated patient/family regarding medication compliance  -encourage smoking cessation, and control of diabetes and hypertension; defer management to primary     Acute stroke due to ischemia (HCC)       Other    Internal carotid artery stent present       Recent Visits  No visits were found meeting these conditions.  Showing recent visits within  past 7 days and meeting all other requirements  Today's Visits  Date Type Provider Dept   04/23/24 Telephone Maddie Steele MD [facility]   04/23/24 Telemedicine Maddie Steele MD [facility]   Showing today's visits and meeting all other requirements  Future Appointments  No visits were found meeting these conditions.  Showing future appointments within next 150 days and meeting all other requirements     This is a 65 y/o M who is here as a follow up for left  MCA stroke s/p left ICA carotid stent, and 70% right carotid stenosis and has a loop recorder placement for 3 years which did not show afib and needs to get it explanted    Patient was in the hospital recently with another stroke, and it was on the left.  Patient had two more small punctate strokes and etiology is unclear, patient was started on pletal at this time in addition to aspirin and Brilinta and cilastazole was added at this time.     He has not had any new symptoms or strokes. But wife states that he had COVID recently and also the COVID shot as well, and is wondering if his stroke is from the COVID shot. She says that he has been complaint w/ his medications and is tolerating them well.     Visit Time  Total Visit Duration: 40    I have spent a total time of 40 minutes on 04/23/24 in caring for this patient including Risks and benefits of tx options, Instructions for management, Documenting in the medical record, Reviewing / ordering tests, medicine, procedures  , and Obtaining or reviewing history  .

## 2024-04-23 NOTE — ASSESSMENT & PLAN NOTE
-for secondary stroke prevention, recommend continuation of combination of aspirin, Brilinta and pletal and atorvastatin  -he is currently getting PT, OT and ST twice a week  -LDL goal <70  -I advised patient to avoid using NSAIDs for headaches or other pain and to stick to tylenol if needed  -Recommend lifestyle modifications such as mediterranean diet & regular exercise regimen atleast 4-5 times a week for 20-30 minutes.   -I educated patient/family regarding medication compliance  -encourage smoking cessation, and control of diabetes and hypertension; defer management to primary

## 2024-04-23 NOTE — TELEPHONE ENCOUNTER
Tried to call pt to check in for virtual visit w/ Dr Steele. Unable to leave message because it was spouses number and she is not on Communication Consent

## 2024-04-23 NOTE — TELEPHONE ENCOUNTER
Pt's wife Faiza called to schedule a hospital f/up for pt at the [place] office. Call got disconnected can a call be placed back for pt for schedule estrellita. I did try to call pt back and was unsuccessful.

## 2024-04-23 NOTE — TELEPHONE ENCOUNTER
Patient's spouse called and stated that due to stroke patient forgot [facility] info and was unable to login [facility] to do VV with Dr Steele.  Called [facility] office spoke to MR and she further assisted patient and spouse.

## 2024-04-24 ENCOUNTER — TELEPHONE (OUTPATIENT)
Age: 64
End: 2024-04-24

## 2024-04-24 ENCOUNTER — TELEPHONE (OUTPATIENT)
Dept: NEUROLOGY | Facility: CLINIC | Age: 64
End: 2024-04-24

## 2024-04-24 NOTE — TELEPHONE ENCOUNTER
Reviewed.  Patient has significant narrowing of his right ICA and therefore should have carotid duplex completed as previously ordered by Dr. Oates.

## 2024-04-24 NOTE — TELEPHONE ENCOUNTER
Caller: Faiza Zhu    Doctor: Dr. Oates    Reason for call: Questioning VAS carotid complete study    Call back#: [phone]    Patient and spouse would like a call back regarding scheduling carotid study.  Patient was recently hospitalized for a stroke and had multiple studies including studies of head and neck.  Spouse wants to be sure that vascular carotid study still needs to be done before scheduling followup vascular studies.

## 2024-04-24 NOTE — TELEPHONE ENCOUNTER
Patient called to schedule his 3 month f/u with , LOV 4/23/2024, VV     Scheduled for 7/23/2024, Dr. Steele, All, 10am In person.

## 2024-04-25 ENCOUNTER — TELEPHONE (OUTPATIENT)
Age: 64
End: 2024-04-25

## 2024-04-25 NOTE — TELEPHONE ENCOUNTER
Yes. Carotid duplex needs to be completed at this time as we are trending the velocities through his right ICA and this study is now 2 months overdue.

## 2024-04-25 NOTE — TELEPHONE ENCOUNTER
Contacted patient's wife Faiza to inform her of recommendation from ERIC Chung.  Offered to transfer to her to a  and she requested to have a  contact her back as she was driving.      Message sent to Vascular Clerical to contact patient's wife Faiza to schedule Carotid doppler and see if they can move up patient's LEAD and AOIL.  Patient will also need OV as recommended by triage to review studies.

## 2024-04-25 NOTE — TELEPHONE ENCOUNTER
Wife returned call, pt was recently hospitalized for stroke, see chart for details. He is seeing neurosurgery 5/1. He saw Dr. Oates 8/18/23. She recommended f/u in 6 mos (also aoil and lead).  All 3 dopplers were scheduled in February however they were cx as pt was admitted to hospital w/ Covid.  He was readmitted 4/11 w/ acute CVA.  Wife is calling to r/s dopplers, she states that the earliest apt available for lead and aoil is 7/3 as pt has to fast and would not be able to do so unless testing first thing in the am.  They tried several locations.    Also pt did have CTA head and neck 4/12 so I would like to confirm if CV doppler needs to be repeated at this time and if not when would you recommend it be repeated?     If CV doppler is needed it will need to be scheduled as well as f/u OV to review all doppler studies and f/u from hospital. I am including Prerna on this email as the pt should not be waiting until July to have his dopplers and f/u visit.

## 2024-04-26 ENCOUNTER — TELEPHONE (OUTPATIENT)
Age: 64
End: 2024-04-26

## 2024-04-26 NOTE — TELEPHONE ENCOUNTER
Patient wife called to move the VAS appt from july to june but is requesting a call back because she had a missed call from the office and she has questions regarding a test

## 2024-05-01 ENCOUNTER — APPOINTMENT (OUTPATIENT)
Dept: LAB | Facility: CLINIC | Age: 64
End: 2024-05-01
Payer: COMMERCIAL

## 2024-05-01 ENCOUNTER — TELEPHONE (OUTPATIENT)
Dept: NEUROSURGERY | Facility: CLINIC | Age: 64
End: 2024-05-01

## 2024-05-01 ENCOUNTER — OFFICE VISIT (OUTPATIENT)
Dept: NEUROSURGERY | Facility: CLINIC | Age: 64
End: 2024-05-01
Payer: COMMERCIAL

## 2024-05-01 VITALS
RESPIRATION RATE: 18 BRPM | WEIGHT: 197 LBS | HEIGHT: 68 IN | SYSTOLIC BLOOD PRESSURE: 118 MMHG | BODY MASS INDEX: 29.86 KG/M2 | TEMPERATURE: 98.4 F | DIASTOLIC BLOOD PRESSURE: 74 MMHG | OXYGEN SATURATION: 97 % | HEART RATE: 69 BPM

## 2024-05-01 DIAGNOSIS — Z01.812 PRE-PROCEDURAL LABORATORY EXAMINATIONS: ICD-10-CM

## 2024-05-01 DIAGNOSIS — G91.2 NPH (NORMAL PRESSURE HYDROCEPHALUS) (HCC): ICD-10-CM

## 2024-05-01 DIAGNOSIS — I65.22 LEFT CAROTID STENOSIS: ICD-10-CM

## 2024-05-01 DIAGNOSIS — I65.22 LEFT CAROTID STENOSIS: Primary | ICD-10-CM

## 2024-05-01 DIAGNOSIS — I65.23 BILATERAL CAROTID ARTERY STENOSIS: ICD-10-CM

## 2024-05-01 DIAGNOSIS — I49.9 IRREGULAR HEART RHYTHM: ICD-10-CM

## 2024-05-01 DIAGNOSIS — E04.2 MULTIPLE THYROID NODULES: ICD-10-CM

## 2024-05-01 LAB
ATRIAL RATE: 78 BPM
BUN SERPL-MCNC: 24 MG/DL (ref 5–25)
CREAT SERPL-MCNC: 1.05 MG/DL (ref 0.6–1.3)
GFR SERPL CREATININE-BSD FRML MDRD: 74 ML/MIN/1.73SQ M
P AXIS: 10 DEGREES
PA ADP BLD-ACNC: 9 PRU (ref 194–418)
PR INTERVAL: 140 MS
QRS AXIS: 2 DEGREES
QRSD INTERVAL: 98 MS
QT INTERVAL: 376 MS
QTC INTERVAL: 429 MS
T WAVE AXIS: 10 DEGREES
T4 FREE SERPL-MCNC: 0.86 NG/DL (ref 0.61–1.12)
TSH SERPL DL<=0.05 MIU/L-ACNC: 1.38 UIU/ML (ref 0.45–4.5)
VENTRICULAR RATE: 78 BPM

## 2024-05-01 PROCEDURE — 93010 ELECTROCARDIOGRAM REPORT: CPT | Performed by: INTERNAL MEDICINE

## 2024-05-01 PROCEDURE — 99215 OFFICE O/P EST HI 40 MIN: CPT | Performed by: NEUROLOGICAL SURGERY

## 2024-05-01 PROCEDURE — 36415 COLL VENOUS BLD VENIPUNCTURE: CPT

## 2024-05-01 PROCEDURE — 84439 ASSAY OF FREE THYROXINE: CPT

## 2024-05-01 PROCEDURE — 82565 ASSAY OF CREATININE: CPT

## 2024-05-01 PROCEDURE — 85576 BLOOD PLATELET AGGREGATION: CPT

## 2024-05-01 PROCEDURE — 93005 ELECTROCARDIOGRAM TRACING: CPT

## 2024-05-01 PROCEDURE — 84443 ASSAY THYROID STIM HORMONE: CPT

## 2024-05-01 PROCEDURE — 84520 ASSAY OF UREA NITROGEN: CPT

## 2024-05-01 RX ORDER — SODIUM CHLORIDE 9 MG/ML
75 INJECTION, SOLUTION INTRAVENOUS CONTINUOUS
OUTPATIENT
Start: 2024-05-01

## 2024-05-01 RX ORDER — ACETAMINOPHEN 500 MG
500 TABLET ORAL AS NEEDED
COMMUNITY

## 2024-05-01 NOTE — TELEPHONE ENCOUNTER
5/1/24 Patient sent to the [facility] lab to have ECG done STAT as per Dr. Mattson also orders for P2Y12 were ordered.

## 2024-05-01 NOTE — PROGRESS NOTES
Patient Id: Constanza Zhu is a 64 y.o. male        Handedness: Right     Assessment/Plan:    Diagnoses and all orders for this visit:    Left carotid stenosis  -     Ambulatory referral to Neurosurgery  -     P2Y12 Platelet inhibitor; Future  -     IR cerebral angiography / intervention; Future    Irregular heart rhythm  -     ECG 12 lead; Future    Bilateral carotid artery stenosis  -     IR cerebral angiography / intervention; Future    Other orders  -     acetaminophen (TYLENOL) 500 mg tablet; Take 500 mg by mouth as needed for mild pain  -     Diet NPO; Sips with meds; Standing  -     Nursing communication Apply gown prior to procedure; Standing  -     Have Patient Void On Call to Procedure Room; Standing  -     Insert and Maintain IV; Standing  -     sodium chloride 0.9 % infusion        Discussion and summary:   1.  Symptomatic left carotid stenosis status post angioplasty and stenting 5/4/2023  Most recent CT is stable.  He was briefly taken off triple therapy after an episode of COVID and while off triple therapy subsequently had a COVID-vaccine and had a small stroke.  Since the stroke he has had some difficulty recovering but his modified [place] score has improved to 3.  He remains on triple therapy today.  His P2 Y12 today was therapeutic.  I think it is reasonable to continue his triple therapy for a total of 6 months and then transition back towards DAPT.    Previously there had been concern for in-stent stenosis.  A formal arteriogram will be performed in the near future to better assess this.  I do not see any significant stenosis noted on his most recent CTA.    2.  Asymptomatic right carotid stenosis measuring 60 to 70%  He is currently on medical management.  His most recent CTA demonstrates progressive stenosis of this right internal carotid artery.  Given his risk factors I have discussed repeat arteriography with him with possible stenting.  He understands the risks of carotid artery stenting  including bleeding, vascular injury, stroke and death.  He has elected to proceed with this we will do it in the near future.     3.  Prior history of right MCA thrombectomy with evidence of intracranial stenosis   Continued treatment with dual antiplatelet therapy     4.  Concern for normal pressure hydrocephalus.  He has had some intermittent episodes of urinary incontinence however his gait and memory have not changed.  While he does have large ventricles and given his vascular risk factors I am hesitant to even stop his antiplatelet therapy for lumbar puncture.  We will continue to monitor clinically.  If there is a further decline with enough time elapsed to de-escalate as antiplatelets we will consider lumbar puncture at that juncture.    5.  Irregular heart rhythm  On physical exam he appears to have irregularly irregular heart rate.  I will get an EKG today.  He is also seeing his cardiologist tomorrow.  He has a loop recorder, however, the battery is dead and plans for explant.    I have spent a total time of 45 minutes on 05/01/24 in caring for this patient including Diagnostic results, Prognosis, Risks and benefits of tx options, Instructions for management, Patient and family education, Importance of tx compliance, Risk factor reductions, Impressions, Counseling / Coordination of care, Documenting in the medical record, Reviewing / ordering tests, medicine, procedures  , Obtaining or reviewing history  , and Communicating with other healthcare professionals .     Chief Complaint: Follow-up (Hfu from recent stroke )        HPI:   This is an unfortunate 64-year-old gentleman who initially presented in 2019 requiring a right MCA thrombectomy and had significant improvement.  Unfortunately he represented approximately 2 months ago with a new left MCA stroke and significant bilateral carotid stenosis.  He underwent a left carotid angioplasty and stenting which was without complication.  He was ultimately  discharged from the hospital to rehab.     Since his last visit he did have another small stroke while off triple therapy.  This was in proximity to his COVID-vaccine.  He is now back on triple therapy and has had no further neurologic symptoms.  Prior to his recrudescent stroke he was largely independent and walking quite well.  Most recently he has had some fatigue but still walks at home without a walker.  His modified [place] score is approximately 3.  He is also noted to have asymptomatic stenosis of his carotid.  He and his family deny any other neurologic changes.    He is allergic to lisinopril.     He is .  He has 1 son that is 40 and 1 daughter that is 39.  He is currently disabled. He has not smoked since onset of hospitalization.  He does not drink alcohol or use illicit drugs.  He has 3 maternal uncles that had strokes. His father also suffered a stroke.  His father was adopted so extended family history is unknown.    Review of systems obtained by the MA reviewed and updated below.      Review of Systems   Constitutional:  Positive for fatigue (since stroke).   HENT:  Negative for tinnitus and trouble swallowing.    Eyes:  Positive for visual disturbance (blurry vision).   Genitourinary:  Positive for frequency (going frequently wakes up throughout the night 3-4 times).   Musculoskeletal:  Positive for gait problem (wheelchair bound).   Neurological:  Positive for speech difficulty. Negative for dizziness, weakness, light-headedness, numbness and headaches.   Hematological:  Bruises/bleeds easily (medications).   Psychiatric/Behavioral:  Positive for confusion (gets confused at times), decreased concentration and sleep disturbance (trouble staying asleep).        Physical Exam  Vitals:    05/01/24 1001   BP: 118/74   Pulse: 69   Resp: 18   Temp: 98.4 °F (36.9 °C)   SpO2: 97%   He is well appearing.  Affect is appropriate. His BMI is Body mass index is 29.95 kg/m².. He is awake alert and oriented.   Hearing and vision are grossly intact.   His pupils are equal round reactive to light.  His extraocular movements are intact.  His face is symmetric.  Tongue is midline.  Facial sensation is intact and symmetric throughout.  Shoulder shrug is 5/5.  There is no drift or dysmetria.    He has full strength in his bilateral upper and lower extremities.  In wheelchair today.    His heart rate is IRREGULARLY IRREGULAR.  Normal respiratory effort.  Abdomen nondistended.  Radial pulses 2+.     The following portions of the patient's history were reviewed and updated as appropriate: allergies, current medications, past family history, past medical history, past social history, past surgical history, and problem list.    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) 03/28/2019    Anxiety and depression 03/29/2019    Insomnia 03/29/2019    History of prediabetes 04/03/2019    Status post placement of implantable loop recorder 04/06/2019    Snoring 08/10/2020    Memory deficits 08/10/2020    Hypertensive encephalopathy, transient 01/12/2023    Chronic low back pain 04/16/2023    Middle cerebral artery stenosis, right 04/17/2023    Left posterior MCA stroke - etiology unclear at this time 04/17/2023    Chronic anticoagulation 04/26/2023    Recurrent strokes (HCC) 04/26/2023    Hyponatremia 04/26/2023    Prediabetes 04/27/2023    SIRS (systemic inflammatory response syndrome) (HCC) 04/27/2023    Tachycardia 05/05/2023    Infrarenal abdominal aortic aneurysm (AAA) without rupture (HCC)  05/08/2023    History of tobacco use 05/12/2023    Chronic ischemic left MCA stroke 05/12/2023    Abnormal laboratory test 05/15/2023    Multiple thyroid nodules 06/01/2023    Possible NPH (normal pressure hydrocephalus) ([facility]) 06/02/2023    Muscle spasms of both lower extremities 06/02/2023    Aphasia 06/06/2023    Atherosclerosis of native arteries of extremities with intermittent claudication, bilateral legs ([facility]) 06/06/2023    Benign prostatic hyperplasia with lower urinary tract symptoms 06/06/2023    Type 2 diabetes mellitus with other diabetic ophthalmic complication ([facility]) 07/20/2023    Claudication of both lower extremities ([facility]) 08/18/2023    Focal epilepsy ([facility]) 08/31/2023    Internal carotid artery stent present 01/23/2024    Vision problem 01/23/2024    Dry eyes 01/23/2024    Acute CVA (cerebrovascular accident) ([facility]) 02/08/2024    COVID 02/08/2024    Internal carotid artery stenosis, left 04/16/2024    Acute stroke due to ischemia ([facility]) 04/23/2024     Resolved Ambulatory Problems     Diagnosis Date Noted    Hyperglycemia 03/19/2019    Dyslipidemia 03/26/2019    Arthropathy of left shoulder 03/28/2019    Chronic ischemic right MCA stroke 08/06/2019    Vascular dementia ([facility]) 03/02/2021    Leukocytosis 05/12/2023    Hypokalemia 05/12/2023    Sepsis due to COVID-19 ([facility]) 10/03/2023    AMS (altered mental status) 02/08/2024     Past Medical History:   Diagnosis Date    Depression     Diabetes mellitus ([facility])     Hyperlipidemia     Hypertension     Stroke ([facility])        Past Surgical History:   Procedure Laterality Date    ARTHROSCOPIC REPAIR ACL Left     BACK SURGERY      twice    CARPAL TUNNEL RELEASE Right     IR CEREBRAL ANGIOGRAPHY  05/04/2023    IR STROKE ALERT  03/19/2019    SHOULDER SURGERY Left     US GUIDED THYROID BIOPSY  11/21/2023         Current Outpatient Medications:     acetaminophen (TYLENOL) 500 mg tablet, Take 500 mg by mouth as needed for mild pain, Disp: , Rfl:     albuterol (PROVENTIL  HFA,VENTOLIN HFA) 90 mcg/act inhaler, , Disp: , Rfl:     aspirin (ECOTRIN LOW STRENGTH) 81 mg EC tablet, Take 1 tablet (81 mg total) by mouth daily Do not start before April 19, 2023., Disp: 30 tablet, Rfl: 0    atorvastatin (LIPITOR) 40 mg tablet, Take 40 mg by mouth daily with breakfast, Disp: , Rfl:     baclofen 5 MG TABS, Take 5 mg by mouth 2 (two) times a day as needed for muscle spasms, Disp: , Rfl: 0    cilostazol (PLETAL) 100 mg tablet, Take 1 tablet (100 mg total) by mouth 2 (two) times a day before meals, Disp: 60 tablet, Rfl: 0    donepezil (ARICEPT) 10 mg tablet, Take 1 tablet (10 mg total) by mouth in the morning, Disp: 60 tablet, Rfl: 2    hydroCHLOROthiazide 12.5 mg tablet, , Disp: , Rfl:     levETIRAcetam (Keppra) 500 mg tablet, Take 1 tab (500 mg) twice a day for 1 week, then take 2 tabs (1000 mg) twice a day. (Patient taking differently: Take 500 mg by mouth 2 (two) times a day On recent neuro note, pt is 500mg bid), Disp: 360 tablet, Rfl: 3    losartan (COZAAR) 50 mg tablet, Take 50 mg by mouth daily, Disp: , Rfl:     MELATONIN GUMMIES PO, Take 5 mg by mouth daily at bedtime, Disp: , Rfl:     metoprolol succinate (TOPROL-XL) 100 mg 24 hr tablet, Take 100 mg by mouth daily, Disp: , Rfl:     pantoprazole (PROTONIX) 40 mg tablet, Take 1 tablet (40 mg total) by mouth daily, Disp: 90 tablet, Rfl: 0    polyethylene glycol (MIRALAX) 17 g packet, Take 17 g by mouth daily, Disp: 30 each, Rfl: 0    tamsulosin (FLOMAX) 0.4 mg, Take 1 capsule (0.4 mg total) by mouth daily with dinner, Disp: 90 capsule, Rfl: 3    ticagrelor (BRILINTA) 90 MG, Take 1 tablet (90 mg total) by mouth every 12 (twelve) hours, Disp: 60 tablet, Rfl: 3    traZODone (DESYREL) 100 mg tablet, Take 100 mg by mouth daily at bedtime, Disp: , Rfl:     hydrALAZINE (APRESOLINE) 25 mg tablet, , Disp: , Rfl:     Results/Data: Imaging personally reviewed in detail with patient as well as reports. Of note, continued right MCA stenosis and right  carotid stenosis.

## 2024-05-01 NOTE — Clinical Note
Yong Patiño You are seeing Mr. Zhu tomorrow.  I sent him for a stat EKG today, clinically he had irregularly irregular pulses bilaterally.  Wants to rule out A-fib.  I know his loop recorder no longer works.  I does want to make sure we follow-up on this.  I am also planning on doing an angiogram and potentially stenting his right carotid in the near future.  Thanks, Glenn

## 2024-05-02 ENCOUNTER — OFFICE VISIT (OUTPATIENT)
Dept: CARDIOLOGY CLINIC | Facility: CLINIC | Age: 64
End: 2024-05-02
Payer: COMMERCIAL

## 2024-05-02 VITALS
WEIGHT: 197 LBS | HEIGHT: 66 IN | DIASTOLIC BLOOD PRESSURE: 82 MMHG | BODY MASS INDEX: 31.66 KG/M2 | SYSTOLIC BLOOD PRESSURE: 128 MMHG | HEART RATE: 88 BPM

## 2024-05-02 DIAGNOSIS — E11.39 TYPE 2 DIABETES MELLITUS WITH OTHER DIABETIC OPHTHALMIC COMPLICATION (HCC): ICD-10-CM

## 2024-05-02 DIAGNOSIS — I49.3 PVC (PREMATURE VENTRICULAR CONTRACTION): Primary | ICD-10-CM

## 2024-05-02 DIAGNOSIS — I70.213 ATHEROSCLEROSIS OF NATIVE ARTERIES OF EXTREMITIES WITH INTERMITTENT CLAUDICATION, BILATERAL LEGS (HCC): ICD-10-CM

## 2024-05-02 PROCEDURE — 99214 OFFICE O/P EST MOD 30 MIN: CPT | Performed by: PHYSICIAN ASSISTANT

## 2024-05-02 NOTE — PATIENT INSTRUCTIONS
We will see if a loop explant will be possible later this month while you are in the hospital anyway. If possible, they will reach out to you.   No changes to medications today, please continue everything the same. If you need refills of your cardiac medications prescribed by our office, please call us ahead of time so that they can be filled.   We will see you back in the office in 6 months. If you have any questions or concerns in the mean time please do not hesitate to call our office.

## 2024-05-02 NOTE — PROGRESS NOTES
"Cardiology Office Follow Up  Constanza Zhu  1960  [id number]      ASSESSMENT:  Left carotid artery stenosis status post angioplasty in May 2023  History of MCA CVA  History of CVA  Loop recorder in situ at end of battery  Sinus rhythm with PVCs    PLAN/ DISCUSSION:  We reviewed his most recent EKG which shows sinus rhythm with isolated PVC.  He is asymptomatic from this.  He had prior loop recorder which did not show elevated PVC burden.  At this point no further workup is needed.  Recent echocardiogram showed stable cardiac structure although mild AAS and mild AI are noted.  Would recommend follow-up echocardiogram in about 1 year for surveillance of aortic valve  Will reach out to scheduling regarding loop recorder explant  No changes were made to medications today from cardiac standpoint  Follow-up in 6 months    Interval History/ HPI:   64-year-old gentleman last seen in the cardiology clinic in August 2023.  At that time he was discussed to have elective recorder explant on a nonurgent basis.  Echocardiogram was performed recently in April showing preserved LVEF with mild AS.  He was noted to have an irregular heart rhythm at the neurosurgery office yesterday and is here today for follow-up visit.    Today comes to the office accompanied by his significant other.  He reports feeling at baseline.  No chest pain or chest pressure.  Breathing is stable.  No palpitations or dizziness.  No episodes of passing out.     Vitals:  /82 (BP Location: Left arm, Patient Position: Sitting, Cuff Size: Standard)   Pulse 88   Ht 5' 6\" (1.676 m)   Wt 89.4 kg (197 lb)   BMI 31.80 kg/m²      Past Medical History:   Diagnosis Date    Depression     Diabetes mellitus (HCC)     patient denies    Dyslipidemia 03/26/2019    GERD (gastroesophageal reflux disease)     Hyperlipidemia     Hypertension     Prediabetes     Prediabetes 04/03/2019    Stroke ([facility])      Social History     Socioeconomic History    Marital status: " /Civil Union     Spouse name: Not on file    Number of children: Not on file    Years of education: Not on file    Highest education level: Not on file   Occupational History    Not on file   Tobacco Use    Smoking status: Former    Smokeless tobacco: Never   Vaping Use    Vaping status: Never Used   Substance and Sexual Activity    Alcohol use: Never    Drug use: Never    Sexual activity: Not Currently   Other Topics Concern    Not on file   Social History Narrative    Lives in [place] with wife     Social Determinants of Health     Financial Resource Strain: Not on file   Food Insecurity: No Food Insecurity (4/12/2024)    Hunger Vital Sign     Worried About Running Out of Food in the Last Year: Never true     Ran Out of Food in the Last Year: Never true   Transportation Needs: No Transportation Needs (4/12/2024)    PRAPARE - Transportation     Lack of Transportation (Medical): No     Lack of Transportation (Non-Medical): No   Physical Activity: Not on file   Stress: Not on file   Social Connections: Not on file   Intimate Partner Violence: Not on file   Housing Stability: Low Risk  (4/12/2024)    Housing Stability Vital Sign     Unable to Pay for Housing in the Last Year: No     Number of Places Lived in the Last Year: 1     Unstable Housing in the Last Year: No      Family History   Problem Relation Age of Onset    Hyperlipidemia Mother     Hypertension Mother     Diabetes unspecified Mother         prediabetes    Heart attack Mother     Diabetes Mother     Hyperlipidemia Father     Hypertension Father     Prostate cancer Father     Stroke Father     Hyperlipidemia Sister     Hypertension Sister     Hyperlipidemia Sister     Hypertension Sister     Depression Sister     Hypertension Sister     Hyperlipidemia Sister     Depression Sister     Hyperlipidemia Brother     Hypertension Brother     Hypertension Brother     Prostate cancer Brother     Hypothyroidism Daughter     Hypothyroidism Son      Diabetes unspecified Son     Seizures Neg Hx      Past Surgical History:   Procedure Laterality Date    ARTHROSCOPIC REPAIR ACL Left     BACK SURGERY      twice    CARPAL TUNNEL RELEASE Right     IR CEREBRAL ANGIOGRAPHY  05/04/2023    IR STROKE ALERT  03/19/2019    SHOULDER SURGERY Left     US GUIDED THYROID BIOPSY  11/21/2023       Current Outpatient Medications:     acetaminophen (TYLENOL) 500 mg tablet, Take 500 mg by mouth as needed for mild pain, Disp: , Rfl:     albuterol (PROVENTIL HFA,VENTOLIN HFA) 90 mcg/act inhaler, , Disp: , Rfl:     aspirin (ECOTRIN LOW STRENGTH) 81 mg EC tablet, Take 1 tablet (81 mg total) by mouth daily Do not start before April 19, 2023., Disp: 30 tablet, Rfl: 0    atorvastatin (LIPITOR) 40 mg tablet, Take 40 mg by mouth daily with breakfast, Disp: , Rfl:     baclofen 5 MG TABS, Take 5 mg by mouth 2 (two) times a day as needed for muscle spasms, Disp: , Rfl: 0    cilostazol (PLETAL) 100 mg tablet, Take 1 tablet (100 mg total) by mouth 2 (two) times a day before meals, Disp: 60 tablet, Rfl: 0    donepezil (ARICEPT) 10 mg tablet, Take 1 tablet (10 mg total) by mouth in the morning, Disp: 60 tablet, Rfl: 2    hydrALAZINE (APRESOLINE) 25 mg tablet, , Disp: , Rfl:     hydroCHLOROthiazide 12.5 mg tablet, , Disp: , Rfl:     levETIRAcetam (Keppra) 500 mg tablet, Take 1 tab (500 mg) twice a day for 1 week, then take 2 tabs (1000 mg) twice a day. (Patient taking differently: Take 500 mg by mouth 2 (two) times a day On recent neuro note, pt is 500mg bid), Disp: 360 tablet, Rfl: 3    losartan (COZAAR) 50 mg tablet, Take 50 mg by mouth daily, Disp: , Rfl:     MELATONIN GUMMIES PO, Take 5 mg by mouth daily at bedtime, Disp: , Rfl:     metoprolol succinate (TOPROL-XL) 100 mg 24 hr tablet, Take 100 mg by mouth daily, Disp: , Rfl:     pantoprazole (PROTONIX) 40 mg tablet, Take 1 tablet (40 mg total) by mouth daily, Disp: 90 tablet, Rfl: 0    polyethylene glycol (MIRALAX) 17 g packet, Take 17 g by  mouth daily, Disp: 30 each, Rfl: 0    tamsulosin (FLOMAX) 0.4 mg, Take 1 capsule (0.4 mg total) by mouth daily with dinner, Disp: 90 capsule, Rfl: 3    ticagrelor (BRILINTA) 90 MG, Take 1 tablet (90 mg total) by mouth every 12 (twelve) hours, Disp: 60 tablet, Rfl: 3    traZODone (DESYREL) 100 mg tablet, Take 100 mg by mouth daily at bedtime, Disp: , Rfl:       Review of Systems:  Review of Systems   Constitutional:  Negative for chills and fever.   HENT:  Negative for ear pain and sore throat.    Eyes:  Negative for pain and visual disturbance.   Respiratory:  Negative for cough and shortness of breath.    Cardiovascular:  Negative for chest pain and palpitations.   Gastrointestinal:  Negative for abdominal pain and vomiting.   Genitourinary:  Negative for dysuria and hematuria.   Musculoskeletal:  Negative for arthralgias and back pain.   Skin:  Negative for color change and rash.   Neurological:  Negative for seizures and syncope.   All other systems reviewed and are negative.        Physical Exam:  Physical Exam  Constitutional:       Appearance: He is well-developed.   HENT:      Head: Normocephalic and atraumatic.   Eyes:      Pupils: Pupils are equal, round, and reactive to light.   Cardiovascular:      Rate and Rhythm: Normal rate and regular rhythm.      Heart sounds: No murmur heard.     No friction rub. No gallop.   Pulmonary:      Effort: Pulmonary effort is normal. No respiratory distress.      Breath sounds: Normal breath sounds. No wheezing or rales.   Abdominal:      General: Bowel sounds are normal. There is no distension.      Palpations: Abdomen is soft.      Tenderness: There is no abdominal tenderness.   Musculoskeletal:         General: No deformity. Normal range of motion.      Cervical back: Normal range of motion and neck supple.   Skin:     General: Skin is warm and dry.   Neurological:      Mental Status: He is alert and oriented to person, place, and time.   Psychiatric:         Behavior:  Behavior normal.         This note was completed in part utilizing [employer] Direct Software.  Grammatical errors, random word insertions, spelling mistakes, and incomplete sentences can be an occasional consequence of this system secondary to software limitations, ambient noise, and hardware issues.  If you have any questions or concerns about the content, text, or information contained within the body of this dictation, please contact the provider for clarification.

## 2024-05-13 ENCOUNTER — TELEPHONE (OUTPATIENT)
Dept: RADIOLOGY | Facility: HOSPITAL | Age: 64
End: 2024-05-13

## 2024-05-13 ENCOUNTER — TELEPHONE (OUTPATIENT)
Dept: NEUROLOGY | Facility: CLINIC | Age: 64
End: 2024-05-13

## 2024-05-13 ENCOUNTER — HOSPITAL ENCOUNTER (OUTPATIENT)
Dept: RADIOLOGY | Facility: HOSPITAL | Age: 64
Discharge: HOME | End: 2024-05-13
Payer: COMMERCIAL

## 2024-05-13 DIAGNOSIS — E04.2 MULTIPLE THYROID NODULES: ICD-10-CM

## 2024-05-13 LAB — HBA1C MFR BLD HPLC: 5.7 %

## 2024-05-13 PROCEDURE — 76536 US EXAM OF HEAD AND NECK: CPT

## 2024-05-13 NOTE — PRE-PROCEDURE INSTRUCTIONS
Pre-procedure Instructions for Interventional Radiology  [address] [address]  INTERVENTIONAL RADIOLOGY [phone]    You are scheduled for a/an cerebral angiogram/intervention.    On Monday 5-20-24.    Your tentative arrival time is 7am.  [facility] will notify you the day before your procedure with the exact arrival time and the location to arrive.    To prepare for your procedure:  Please arrange for someone to drive you home after the procedure and stay with you until the next morning if you are instructed to do so.  This is typically for patients receiving some type of sedative or anesthetic for the procedure.  DO NOT EAT OR DRINK ANYTHING after midnight on the evening before your procedure including candy & gum.  ONLY SIPS OF WATER with your medications are allowed on the morning of your procedure.  TAKE ALL OF YOUR REGULAR MEDICATIONS THE MORNING OF YOUR PROCEDURE with sips of water!  We may call you to stop some of your blood sugar, blood pressure and blood thinning medications depending on the procedure.  Please take all of these medications unless we instruct you to stop them.  If you have an allergy to x-ray dye, please contact Interventional Radiology for an x-ray dye preparation which usually consists of an oral steroid and Benadryl.    The day of your procedure:  Bring a list of the medications you take at home.  Bring medications you take for breathing problems (such as inhalers), medications for chest pain, or both.  Bring a case for your glasses or contacts.  Bring your insurance card and a form of photo ID.  Please leave all valuables such as credit cards and jewelry at home.  Report to the admitting office to the left of the registration desk in the main lobby at the [facility], Entrance B.  You will then be directed to the Short Stay Center.  While your procedure is being performed, your family may wait in the Radiology Waiting Room on the 1st  floor in Radiology.  if they need to leave, they may provide a number to be called following the procedure.   Be prepared to stay overnight just in case. Sometimes procedures will indicate the need for further observation or treatment.   If you are scheduled for a follow-up visit with the Interventional Radiologist after your procedure, you will be called with a date and time.    Special Instructions (Medications to stop taking before your procedure etc.):  Hold Losartan and HCTZ the morning of the procedure.

## 2024-05-13 NOTE — TELEPHONE ENCOUNTER
Pt spouse called in regarding his EEG. Faiza stated that she was given the wrong information and was unable to get the EEG completed. Pt has a lot of appts from now till the end of summer. Faiza stated with all of his appts and taking off of work she is not sure if she will be able to get the pt to another EEG appt by 09/17/2024. Pt spouse would like to know if pt should still keep his appt with  on 09/17/2024?    FAIZA MYERS (Spouse)  [phone] (Mobile)

## 2024-05-17 NOTE — TELEPHONE ENCOUNTER
Okay to keep the appointment even if they can't get the EEG. With how far we schedule out, probably best to just keep the appointment we have. They have time, so would still be good to try to get it done before the appointment.

## 2024-05-17 NOTE — TELEPHONE ENCOUNTER
Called re: Dr. Brasher's recommendations below and left a detailed VM with call back # if any further assistance is required.

## 2024-05-19 NOTE — PLAN OF CARE
Problem: PAIN - ADULT  Goal: Verbalizes/displays adequate comfort level or baseline comfort level  Description: Interventions:  - Encourage patient to monitor pain and request assistance  - Assess pain using appropriate pain scale  - Administer analgesics based on type and severity of pain and evaluate response  - Implement non-pharmacological measures as appropriate and evaluate response  - Consider cultural and social influences on pain and pain management  - Notify physician/advanced practitioner if interventions unsuccessful or patient reports new pain  Outcome: Progressing     Problem: INFECTION - ADULT  Goal: Absence or prevention of progression during hospitalization  Description: INTERVENTIONS:  - Assess and monitor for signs and symptoms of infection  - Monitor lab/diagnostic results  - Monitor all insertion sites, i e  indwelling lines, tubes, and drains  - Monitor endotracheal if appropriate and nasal secretions for changes in amount and color  - [place] appropriate cooling/warming therapies per order  - Administer medications as ordered  - Instruct and encourage patient and family to use good hand hygiene technique  - Identify and instruct in appropriate isolation precautions for identified infection/condition  Outcome: Progressing  Goal: Absence of fever/infection during neutropenic period  Description: INTERVENTIONS:  - Monitor WBC    Outcome: Progressing     Problem: SAFETY ADULT  Goal: Patient will remain free of falls  Description: INTERVENTIONS:  - Educate patient/family on patient safety including physical limitations  - Instruct patient to call for assistance with activity   - Consult OT/PT to assist with strengthening/mobility   - Keep Call bell within reach  - Keep bed low and locked with side rails adjusted as appropriate  - Keep care items and personal belongings within reach  - Initiate and maintain comfort rounds  - Make Fall Risk Sign visible to staff  - Offer Toileting every 2 Hours, [FreeTextEntry1] : 70-year-old female with PMH of morbid obesity, DM, HTN, BONNY, fatty liver, CKD 3b/4 (baseline creatinine ~1.7-2.0mg/dL), GERD and hiatal hernia presents for CKD follow up. Reports to be in close contact with dietician for renal diet education.  Patient reports no health-related adverse event since last clinic visit. NO ER/Hospitalization/urgent care visit. Denies any cardiac or urinary complaints. No dysuria, gross hematuria, SOB, LUTS. Reports BP has been well controlled. in advance of need  - Initiate/Maintain bed/chair alarm  - Obtain necessary fall risk management equipment: non skid footwear  - Apply yellow socks and bracelet for high fall risk patients  - Consider moving patient to room near nurses station  Outcome: Progressing  Goal: Maintain or return to baseline ADL function  Description: INTERVENTIONS:  -  Assess patient's ability to carry out ADLs; assess patient's baseline for ADL function and identify physical deficits which impact ability to perform ADLs (bathing, care of mouth/teeth, toileting, grooming, dressing, etc )  - Assess/evaluate cause of self-care deficits   - Assess range of motion  - Assess patient's mobility; develop plan if impaired  - Assess patient's need for assistive devices and provide as appropriate  - Encourage maximum independence but intervene and supervise when necessary  - Involve family in performance of ADLs  - Assess for home care needs following discharge   - Consider OT consult to assist with ADL evaluation and planning for discharge  - Provide patient education as appropriate  Outcome: Progressing  Goal: Maintains/Returns to pre admission functional level  Description: INTERVENTIONS:  - Perform BMAT or MOVE assessment daily    - Set and communicate daily mobility goal to care team and patient/family/caregiver  - Collaborate with rehabilitation services on mobility goals if consulted  - Perform Range of Motion 3 times a day  - Reposition patient every 2 hours    - Dangle patient 3 times a day  - Stand patient 3 times a day  - Ambulate patient 3 times a day  - Out of bed to chair 3 times a day   - Out of bed for meals 3 times a day  - Out of bed for toileting  - Record patient progress and toleration of activity level   Outcome: Progressing     Problem: DISCHARGE PLANNING  Goal: Discharge to home or other facility with appropriate resources  Description: INTERVENTIONS:  - Identify barriers to discharge w/patient and caregiver  - Arrange for needed discharge resources and transportation as appropriate  - Identify discharge learning needs (meds, wound care, etc )  - Arrange for interpretive services to assist at discharge as needed  - Refer to Case Management Department for coordinating discharge planning if the patient needs post-hospital services based on physician/advanced practitioner order or complex needs related to functional status, cognitive ability, or social support system  Outcome: Progressing

## 2024-05-20 ENCOUNTER — ANESTHESIA EVENT (INPATIENT)
Dept: RADIOLOGY | Facility: HOSPITAL | Age: 64
DRG: 068 | End: 2024-05-20
Payer: COMMERCIAL

## 2024-05-20 ENCOUNTER — ANESTHESIA (INPATIENT)
Dept: RADIOLOGY | Facility: HOSPITAL | Age: 64
DRG: 068 | End: 2024-05-20
Payer: COMMERCIAL

## 2024-05-20 ENCOUNTER — HOSPITAL ENCOUNTER (INPATIENT)
Dept: RADIOLOGY | Facility: HOSPITAL | Age: 64
LOS: 1 days | Discharge: HOME | DRG: 068 | End: 2024-05-21
Attending: NEUROLOGICAL SURGERY | Admitting: NEUROLOGICAL SURGERY
Payer: COMMERCIAL

## 2024-05-20 DIAGNOSIS — I65.22 LEFT CAROTID STENOSIS: ICD-10-CM

## 2024-05-20 DIAGNOSIS — I65.23 BILATERAL CAROTID ARTERY STENOSIS: ICD-10-CM

## 2024-05-20 LAB
ALBUMIN SERPL BCP-MCNC: 4.5 G/DL (ref 3.5–5)
ALP SERPL-CCNC: 92 U/L (ref 34–104)
ALT SERPL W P-5'-P-CCNC: 15 U/L (ref 7–52)
ANION GAP SERPL CALCULATED.3IONS-SCNC: 7 MMOL/L (ref 4–13)
APTT PPP: 28 SECONDS (ref 23–37)
AST SERPL W P-5'-P-CCNC: 14 U/L (ref 13–39)
BILIRUB SERPL-MCNC: 0.45 MG/DL (ref 0.2–1)
BUN SERPL-MCNC: 28 MG/DL (ref 5–25)
CALCIUM SERPL-MCNC: 9.6 MG/DL (ref 8.4–10.2)
CHLORIDE SERPL-SCNC: 103 MMOL/L (ref 96–108)
CO2 SERPL-SCNC: 28 MMOL/L (ref 21–32)
CREAT SERPL-MCNC: 1.1 MG/DL (ref 0.6–1.3)
ERYTHROCYTE [DISTWIDTH] IN BLOOD BY AUTOMATED COUNT: 13 % (ref 11.6–15.1)
GFR SERPL CREATININE-BSD FRML MDRD: 70 ML/MIN/1.73SQ M
GLUCOSE P FAST SERPL-MCNC: 92 MG/DL (ref 65–99)
GLUCOSE SERPL-MCNC: 92 MG/DL (ref 65–140)
HCT VFR BLD AUTO: 43.3 % (ref 36.5–49.3)
HGB BLD-MCNC: 14.3 G/DL (ref 12–17)
INR PPP: 1.03 (ref 0.84–1.19)
MCH RBC QN AUTO: 31.5 PG (ref 26.8–34.3)
MCHC RBC AUTO-ENTMCNC: 33 G/DL (ref 31.4–37.4)
MCV RBC AUTO: 95 FL (ref 82–98)
PLATELET # BLD AUTO: 226 THOUSANDS/UL (ref 149–390)
PLATELET # BLD AUTO: 263 THOUSANDS/UL (ref 149–390)
PMV BLD AUTO: 9.5 FL (ref 8.9–12.7)
PMV BLD AUTO: 9.8 FL (ref 8.9–12.7)
POTASSIUM SERPL-SCNC: 3.7 MMOL/L (ref 3.5–5.3)
PROT SERPL-MCNC: 7.6 G/DL (ref 6.4–8.4)
PROTHROMBIN TIME: 13.4 SECONDS (ref 11.6–14.5)
RBC # BLD AUTO: 4.54 MILLION/UL (ref 3.88–5.62)
SODIUM SERPL-SCNC: 138 MMOL/L (ref 135–147)
WBC # BLD AUTO: 8.41 THOUSAND/UL (ref 4.31–10.16)

## 2024-05-20 PROCEDURE — C1894 INTRO/SHEATH, NON-LASER: HCPCS

## 2024-05-20 PROCEDURE — C1769 GUIDE WIRE: HCPCS

## 2024-05-20 PROCEDURE — 037K3DZ DILATION OF RIGHT INTERNAL CAROTID ARTERY WITH INTRALUMINAL DEVICE, PERCUTANEOUS APPROACH: ICD-10-PCS | Performed by: NEUROLOGICAL SURGERY

## 2024-05-20 PROCEDURE — B41FYZZ FLUOROSCOPY OF RIGHT LOWER EXTREMITY ARTERIES USING OTHER CONTRAST: ICD-10-PCS | Performed by: NEUROLOGICAL SURGERY

## 2024-05-20 PROCEDURE — B315YZZ FLUOROSCOPY OF BILATERAL COMMON CAROTID ARTERIES USING OTHER CONTRAST: ICD-10-PCS | Performed by: NEUROLOGICAL SURGERY

## 2024-05-20 PROCEDURE — 85730 THROMBOPLASTIN TIME PARTIAL: CPT | Performed by: NEUROLOGICAL SURGERY

## 2024-05-20 PROCEDURE — 80053 COMPREHEN METABOLIC PANEL: CPT | Performed by: NEUROLOGICAL SURGERY

## 2024-05-20 PROCEDURE — C1760 CLOSURE DEV, VASC: HCPCS

## 2024-05-20 PROCEDURE — 85049 AUTOMATED PLATELET COUNT: CPT | Performed by: NEUROLOGICAL SURGERY

## 2024-05-20 PROCEDURE — 37215 TRANSCATH STENT CCA W/EPS: CPT

## 2024-05-20 PROCEDURE — 37215 TRANSCATH STENT CCA W/EPS: CPT | Performed by: NEUROLOGICAL SURGERY

## 2024-05-20 PROCEDURE — C1887 CATHETER, GUIDING: HCPCS

## 2024-05-20 PROCEDURE — 36223 PLACE CATH CAROTID/INOM ART: CPT

## 2024-05-20 PROCEDURE — C1884 EMBOLIZATION PROTECT SYST: HCPCS

## 2024-05-20 PROCEDURE — C1876 STENT, NON-COA/NON-COV W/DEL: HCPCS

## 2024-05-20 PROCEDURE — 85610 PROTHROMBIN TIME: CPT | Performed by: NEUROLOGICAL SURGERY

## 2024-05-20 PROCEDURE — 85347 COAGULATION TIME ACTIVATED: CPT

## 2024-05-20 PROCEDURE — 85027 COMPLETE CBC AUTOMATED: CPT | Performed by: NEUROLOGICAL SURGERY

## 2024-05-20 PROCEDURE — NC001 PR NO CHARGE: Performed by: NEUROLOGICAL SURGERY

## 2024-05-20 PROCEDURE — 61650 EVASC PRLNG ADMN RX AGNT 1ST: CPT | Performed by: NEUROLOGICAL SURGERY

## 2024-05-20 PROCEDURE — 99291 CRITICAL CARE FIRST HOUR: CPT | Performed by: EMERGENCY MEDICINE

## 2024-05-20 PROCEDURE — 61650 EVASC PRLNG ADMN RX AGNT 1ST: CPT

## 2024-05-20 RX ORDER — CILOSTAZOL 100 MG/1
100 TABLET ORAL
Status: DISCONTINUED | OUTPATIENT
Start: 2024-05-21 | End: 2024-05-21 | Stop reason: HOSPADM

## 2024-05-20 RX ORDER — GLYCOPYRROLATE 0.2 MG/ML
INJECTION INTRAMUSCULAR; INTRAVENOUS AS NEEDED
Status: DISCONTINUED | OUTPATIENT
Start: 2024-05-20 | End: 2024-05-20

## 2024-05-20 RX ORDER — MIDAZOLAM HYDROCHLORIDE 2 MG/2ML
INJECTION, SOLUTION INTRAMUSCULAR; INTRAVENOUS AS NEEDED
Status: DISCONTINUED | OUTPATIENT
Start: 2024-05-20 | End: 2024-05-20

## 2024-05-20 RX ORDER — LABETALOL HYDROCHLORIDE 5 MG/ML
5 INJECTION, SOLUTION INTRAVENOUS
Status: DISCONTINUED | OUTPATIENT
Start: 2024-05-20 | End: 2024-05-21 | Stop reason: HOSPADM

## 2024-05-20 RX ORDER — METOPROLOL SUCCINATE 100 MG/1
100 TABLET, EXTENDED RELEASE ORAL DAILY
Status: DISCONTINUED | OUTPATIENT
Start: 2024-05-21 | End: 2024-05-21 | Stop reason: HOSPADM

## 2024-05-20 RX ORDER — CHLORHEXIDINE GLUCONATE ORAL RINSE 1.2 MG/ML
15 SOLUTION DENTAL EVERY 12 HOURS SCHEDULED
Status: DISCONTINUED | OUTPATIENT
Start: 2024-05-20 | End: 2024-05-21 | Stop reason: HOSPADM

## 2024-05-20 RX ORDER — DONEPEZIL HYDROCHLORIDE 10 MG/1
10 TABLET, FILM COATED ORAL DAILY
Status: DISCONTINUED | OUTPATIENT
Start: 2024-05-20 | End: 2024-05-21 | Stop reason: HOSPADM

## 2024-05-20 RX ORDER — PANTOPRAZOLE SODIUM 40 MG/1
40 TABLET, DELAYED RELEASE ORAL
Status: DISCONTINUED | OUTPATIENT
Start: 2024-05-21 | End: 2024-05-21 | Stop reason: HOSPADM

## 2024-05-20 RX ORDER — TAMSULOSIN HYDROCHLORIDE 0.4 MG/1
0.4 CAPSULE ORAL
Status: DISCONTINUED | OUTPATIENT
Start: 2024-05-20 | End: 2024-05-21 | Stop reason: HOSPADM

## 2024-05-20 RX ORDER — VERAPAMIL HYDROCHLORIDE 2.5 MG/ML
INJECTION, SOLUTION INTRAVENOUS AS NEEDED
Status: COMPLETED | OUTPATIENT
Start: 2024-05-20 | End: 2024-05-20

## 2024-05-20 RX ORDER — SODIUM CHLORIDE 9 MG/ML
INJECTION, SOLUTION INTRAVENOUS CONTINUOUS PRN
Status: DISCONTINUED | OUTPATIENT
Start: 2024-05-20 | End: 2024-05-20

## 2024-05-20 RX ORDER — HYDRALAZINE HYDROCHLORIDE 20 MG/ML
15 INJECTION INTRAMUSCULAR; INTRAVENOUS
Status: DISCONTINUED | OUTPATIENT
Start: 2024-05-20 | End: 2024-05-21 | Stop reason: HOSPADM

## 2024-05-20 RX ORDER — HEPARIN SODIUM 1000 [USP'U]/ML
INJECTION, SOLUTION INTRAVENOUS; SUBCUTANEOUS AS NEEDED
Status: DISCONTINUED | OUTPATIENT
Start: 2024-05-20 | End: 2024-05-20

## 2024-05-20 RX ORDER — ATORVASTATIN CALCIUM 40 MG/1
40 TABLET, FILM COATED ORAL
Status: DISCONTINUED | OUTPATIENT
Start: 2024-05-21 | End: 2024-05-21 | Stop reason: HOSPADM

## 2024-05-20 RX ORDER — POLYETHYLENE GLYCOL 3350 17 G/17G
17 POWDER, FOR SOLUTION ORAL DAILY
Status: DISCONTINUED | OUTPATIENT
Start: 2024-05-20 | End: 2024-05-21 | Stop reason: HOSPADM

## 2024-05-20 RX ORDER — IODIXANOL 320 MG/ML
200 INJECTION, SOLUTION INTRAVASCULAR
Status: COMPLETED | OUTPATIENT
Start: 2024-05-20 | End: 2024-05-20

## 2024-05-20 RX ORDER — BACLOFEN 10 MG/1
5 TABLET ORAL 2 TIMES DAILY PRN
Status: DISCONTINUED | OUTPATIENT
Start: 2024-05-20 | End: 2024-05-21 | Stop reason: HOSPADM

## 2024-05-20 RX ORDER — HEPARIN SODIUM 5000 [USP'U]/ML
5000 INJECTION, SOLUTION INTRAVENOUS; SUBCUTANEOUS EVERY 8 HOURS SCHEDULED
Status: DISCONTINUED | OUTPATIENT
Start: 2024-05-21 | End: 2024-05-21

## 2024-05-20 RX ORDER — HYDRALAZINE HYDROCHLORIDE 25 MG/1
25 TABLET, FILM COATED ORAL EVERY 8 HOURS SCHEDULED
Status: DISCONTINUED | OUTPATIENT
Start: 2024-05-20 | End: 2024-05-21 | Stop reason: HOSPADM

## 2024-05-20 RX ORDER — FENTANYL CITRATE 50 UG/ML
INJECTION, SOLUTION INTRAMUSCULAR; INTRAVENOUS AS NEEDED
Status: DISCONTINUED | OUTPATIENT
Start: 2024-05-20 | End: 2024-05-20

## 2024-05-20 RX ORDER — SODIUM CHLORIDE, SODIUM GLUCONATE, SODIUM ACETATE, POTASSIUM CHLORIDE, MAGNESIUM CHLORIDE, SODIUM PHOSPHATE, DIBASIC, AND POTASSIUM PHOSPHATE .53; .5; .37; .037; .03; .012; .00082 G/100ML; G/100ML; G/100ML; G/100ML; G/100ML; G/100ML; G/100ML
75 INJECTION, SOLUTION INTRAVENOUS CONTINUOUS
Status: DISCONTINUED | OUTPATIENT
Start: 2024-05-20 | End: 2024-05-21 | Stop reason: HOSPADM

## 2024-05-20 RX ORDER — ALBUTEROL SULFATE 90 UG/1
2 AEROSOL, METERED RESPIRATORY (INHALATION) EVERY 6 HOURS PRN
Status: DISCONTINUED | OUTPATIENT
Start: 2024-05-20 | End: 2024-05-21 | Stop reason: HOSPADM

## 2024-05-20 RX ORDER — TRAZODONE HYDROCHLORIDE 100 MG/1
100 TABLET ORAL
Status: DISCONTINUED | OUTPATIENT
Start: 2024-05-20 | End: 2024-05-21 | Stop reason: HOSPADM

## 2024-05-20 RX ORDER — SODIUM CHLORIDE 9 MG/ML
75 INJECTION, SOLUTION INTRAVENOUS CONTINUOUS
Status: DISCONTINUED | OUTPATIENT
Start: 2024-05-20 | End: 2024-05-20

## 2024-05-20 RX ORDER — LEVETIRACETAM 500 MG/1
500 TABLET ORAL 2 TIMES DAILY
Status: DISCONTINUED | OUTPATIENT
Start: 2024-05-20 | End: 2024-05-21 | Stop reason: HOSPADM

## 2024-05-20 RX ORDER — EPHEDRINE SULFATE 50 MG/ML
INJECTION INTRAVENOUS AS NEEDED
Status: DISCONTINUED | OUTPATIENT
Start: 2024-05-20 | End: 2024-05-20

## 2024-05-20 RX ADMIN — FENTANYL CITRATE 25 MCG: 50 INJECTION INTRAMUSCULAR; INTRAVENOUS at 08:55

## 2024-05-20 RX ADMIN — VERAPAMIL HYDROCHLORIDE 10 MG: 2.5 INJECTION INTRAVENOUS at 09:25

## 2024-05-20 RX ADMIN — TRAZODONE HYDROCHLORIDE 100 MG: 100 TABLET ORAL at 21:21

## 2024-05-20 RX ADMIN — SODIUM CHLORIDE: 0.9 INJECTION, SOLUTION INTRAVENOUS at 08:03

## 2024-05-20 RX ADMIN — CHLORHEXIDINE GLUCONATE 15 ML: 1.2 SOLUTION ORAL at 13:20

## 2024-05-20 RX ADMIN — MIDAZOLAM 1 MG: 1 INJECTION INTRAMUSCULAR; INTRAVENOUS at 08:11

## 2024-05-20 RX ADMIN — HEPARIN SODIUM 5000 UNITS: 1000 INJECTION INTRAVENOUS; SUBCUTANEOUS at 08:33

## 2024-05-20 RX ADMIN — EPHEDRINE SULFATE 5 MG: 50 INJECTION, SOLUTION INTRAVENOUS at 09:06

## 2024-05-20 RX ADMIN — FENTANYL CITRATE 50 MCG: 50 INJECTION INTRAMUSCULAR; INTRAVENOUS at 08:11

## 2024-05-20 RX ADMIN — SODIUM CHLORIDE 75 ML/HR: 0.9 INJECTION, SOLUTION INTRAVENOUS at 07:31

## 2024-05-20 RX ADMIN — EPHEDRINE SULFATE 5 MG: 50 INJECTION, SOLUTION INTRAVENOUS at 09:16

## 2024-05-20 RX ADMIN — CHLORHEXIDINE GLUCONATE 15 ML: 1.2 SOLUTION ORAL at 21:00

## 2024-05-20 RX ADMIN — DONEPEZIL HYDROCHLORIDE 10 MG: 10 TABLET ORAL at 13:20

## 2024-05-20 RX ADMIN — GLYCOPYRROLATE 0.2 MCG: 0.2 INJECTION, SOLUTION INTRAMUSCULAR; INTRAVENOUS at 09:03

## 2024-05-20 RX ADMIN — POLYETHYLENE GLYCOL 3350 17 G: 17 POWDER, FOR SOLUTION ORAL at 13:20

## 2024-05-20 RX ADMIN — EPHEDRINE SULFATE 5 MG: 50 INJECTION, SOLUTION INTRAVENOUS at 09:23

## 2024-05-20 RX ADMIN — IODIXANOL 140 ML: 320 INJECTION, SOLUTION INTRAVASCULAR at 09:47

## 2024-05-20 RX ADMIN — LEVETIRACETAM 500 MG: 500 TABLET, FILM COATED ORAL at 21:21

## 2024-05-20 RX ADMIN — TICAGRELOR 90 MG: 90 TABLET ORAL at 21:21

## 2024-05-20 RX ADMIN — HYDRALAZINE HYDROCHLORIDE 25 MG: 25 TABLET, FILM COATED ORAL at 21:21

## 2024-05-20 RX ADMIN — TAMSULOSIN HYDROCHLORIDE 0.4 MG: 0.4 CAPSULE ORAL at 16:53

## 2024-05-20 NOTE — H&P
"Patient seen and examined independently.  Clinic note from 5/1/24 remains current.  Patient is not on any new medications and has not had any new medical problems.  Patient remains on TAPT.  /64   Pulse 67   Temp 98.1 °F (36.7 °C) (Temporal)   Resp 16   Ht 5' 6\" (1.676 m)   Wt 89.8 kg (198 lb)   SpO2 99%   BMI 31.96 kg/m²  On exam, patient is neurologically intact.  Heart rate is regular.  Breath sounds are clear.  Plan to proceed with diagnostic cerebral arteriogram to treat right ICA stenosis.    "

## 2024-05-20 NOTE — OP NOTE
OPERATIVE REPORT  PATIENT NAME: Constanza Zhu     :  1960  MRN: [id number]   Pt Location: Interventional radiology    SURGERY DATE: 24     Preop Diagnosis:  1. Right internal carotid artery stenosis  2. History of right MCA stroke  3. Prior left DARRELL    Postop Diagnosis  1. Right internal carotid artery stenosis  2. History of right MCA stroke  3. Prior left DARRELL    Procedure:  Right Common Carotid Arteriogram  Left Common Carotid Arteriogram  RIGHT ICA angioplasty and stenting with distal protection device  Intraprocedural and postprocedural arteriography  Slow infusion of 10 mg of verapamil into the right internal carotid artery over 10 minutes for the treatment of vasospasm  Limited Right Femoral Arteriogram    Surgeon:   Glenn Mattson MD    Specimen(s):  None    Estimated Blood Loss:   None    Drains:  None    Anesthesia Type:   Monitored Anesthesia Care     Complications:  None    Operative Indications:  Constanza Zhu  is a very pleasant 64 y.o. male  who suffered from a previous right  MCA stroke.  After discussing the risks and benefits of a diagnostic cerebral arteriogram including bleeding, stroke, groin hematoma, and death the patient elected to proceed.     Operative details:  After obtaining written informed consent, the patient was brought into the operating room and moved to the OR table in supine fashion. The groin was prepped and draped in the usual sterile fashion. Monitored anesthesia care was induced. 10 cc of intradermal lidocaine was infused into the right femoral area and percutaneous access with a 5-Welsh micropuncture kit was obtained into the right femoral artery. A 6 Welsh shuttle sheath was placed.  A baseline ACT was obtained and the patient was heparinized with 5000 units of intravenous heparin. ACTs were checked regularly throughout the case to obtain a level 1.5-2 times greater than the baseline. A 5-Welsh angled Berenstein catheter was then advanced into the aorta,  and over the aortic arch over a Glidewire.     The catheter was then advanced into the left common carotid artery. AP lateral and oblique images of the left cervical carotid were obtained.     AP lateral images of the left intracranial carotid circulation were obtained.     The right common carotid artery was then catheterized. AP lateral and oblique images of the right cervical carotid were obtained.     AP lateral images of the right intracranial carotid circulation were obtained.     The shuttle sheath was then advanced into the mid right cervical common carotid artery.  A spider wire distal protection device was then advanced into the cervical carotid over a Traxcess microwire.  The area of stenosis was crossed and a 5 mm spider distal protection device was deployed in the distal right internal carotid artery.  Intraprocedural arteriogram and physical exam was then performed.    Next a 5.0 mm x 30 mm monorail vicki balloon was advanced over spider wire and area of stenosis.  This was then inflated to nominal pressure.  The patient experienced bradycardia without significant hypotension that was treated by Anesthesia.  The balloon was then deflated and removed.  Intraprocedural arteriography and physical exam was then performed.    Next an 7 millimeter precise stent was advanced into the internal carotid artery along the area of most significant stenosis and deployed under constant fluoroscopy.  Intraprocedural arteriography and physical exam were then performed.  This demonstrated continued stenosis at the most distal portion of the stent.  As such a secondary 7 x 40 mm stent was placed.    The distal protection device was then removed in intraprocedural arteriography of the right common carotid artery was performed as well as the intracranial circulation.  There was some vasospasm of the distal cervical carotid artery and 10 mg of verapamil were slowly infused.    The sheath was then withdrawn and a limited  right femoral arteriogram was run. Puncture site was found to be compatible with a Mynx Closure device and this was done successfully. There was appropriate hemostasis. The patient was then awoken from monitored anesthesia care and found to be in baseline neurologic condition. All sponge and needle counts were correct.        INTERPRETATION OF ANGIOGRAPHIC FINDINGS:   1. The Left common carotid circulation reveals antegrade flow into the internal and external carotid arteries.  The prior left carotid stent is patent.  There is no hemodynamically significant carotid stenosis as defined by NASCET criteria.  Intracranially, there is antegrade flow into the middle cerebral and anterior cerebral arteries. There is no evidence of aneurysm, AVM, or vascular malformation. The capillary and venous phases are unremarkable.      2.  The Right common carotid circulation reveals antegrade flow into the internal and external carotid arteries.  There is approximately 70% stenosis of the internal carotid artery immediately distal to the bifurcation.  There is ulcerated plaque.    Intracranially there is delayed transit time with filling into the middle cerebral and anterior cerebral artery.  The MCA appears to be stenotic proximal to the bifurcation.  There is no evidence of aneurysm or vascular malformation.  The capillary and venous phases are otherwise unremarkable.    3. Intraprocedural arteriography reveals placement of the distal protection device into the distal right internal carotid artery.  Post angioplasty arteriography reveals improvement of flow along the bifurcation without evidence of thrombus/embolus in the distal protection device.  Post stent deployment reveals significant improvement degree of stenosis along the internal carotid artery origin/common carotid/ICA bifurcation.      4. Postprocedural arteriography reveals antegrade flow without significant hemodynamic stenosis along the carotid bifurcation.   Additionally there is antegrade flow into the anterior cerebral and middle cerebral arteries.  There is no evidence of embolism or intracranial thrombosis.  Capillary and venous phases are unremarkable.    5.  Post infusion of verapamil there is decreased vasospasm improved flow through the distal cervical carotid.  No evidence of intracranial thrombus.    6. Limited Right femoral arteriogram reveals normal puncture site anatomy.    Impression:  1. Successful right carotid artery stenting below the level of C1.    Patient Disposition:  Critical Care Unit    SIGNATURE: Glenn Mattson MD  DATE: 05/20/24   TIME: 10:12 AM

## 2024-05-20 NOTE — ANESTHESIA PREPROCEDURE EVALUATION
Procedure:  IR CEREBRAL ANGIOGRAPHY / INTERVENTION    - H/o R MCA thrombectomy (2019)  - H/o L MCA CVA (3/2024) with bilateral carotid stenosis s/p L carotid angioplasty/stenting. Tolerated MAC without issues  - Now for R carotid stenting    Relevant Problems   CARDIO   (+) Hypertriglyceridemia   (+) Infrarenal abdominal aortic aneurysm (AAA) without rupture (HCC)   (+) PVC (premature ventricular contraction)   (+) Primary hypertension      GI/HEPATIC   (+) GERD (gastroesophageal reflux disease)      /RENAL   (+) Benign prostatic hyperplasia with lower urinary tract symptoms      MUSCULOSKELETAL   (+) Chronic low back pain      NEURO/PSYCH   (+) Acute CVA (cerebrovascular accident) (HCC)   (+) Acute stroke due to ischemia (HCC)   (+) Anxiety and depression   (+) Aphasia   (+) Atherosclerosis of native arteries of extremities with intermittent claudication, bilateral legs (HCC)   (+) Chronic low back pain   (+) Claudication of both lower extremities (HCC)   (+) Focal epilepsy (HCC)   (+) Headache   (+) 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 Wall thickness is mildly increased. The left ventricular ejection fraction is 65%. Systolic function is normal. Wall motion is normal. Diastolic function is mildly abnormal, consistent with grade I (abnormal) relaxation.    Right Ventricle: Right ventricular cavity size is normal. Systolic function is normal.    Left Atrium: The atrium is moderately dilated.    Aortic Valve: There is mild regurgitation. There is mild stenosis.       Physical Exam    Airway    Mallampati score: II  TM Distance: >3 FB  Neck ROM: full     Dental   No notable dental hx     Cardiovascular  Cardiovascular exam normal    Pulmonary  Pulmonary exam normal     Other Findings        Anesthesia Plan  ASA Score- 4     Anesthesia Type- IV sedation with anesthesia with ASA Monitors.          Additional Monitors: arterial line.    Airway Plan:            Plan Factors-Exercise tolerance (METS): >4 METS.    Chart reviewed. EKG reviewed. Imaging results reviewed. Existing labs reviewed. Patient summary reviewed.    Patient is not a current smoker.      Obstructive sleep apnea risk education given perioperatively.        Induction- intravenous.    Postoperative Plan- Plan for postoperative opioid use.         Informed Consent- Anesthetic plan and risks discussed with patient and spouse.  I personally reviewed this patient with the CRNA. Discussed and agreed on the Anesthesia Plan with the CRNA..

## 2024-05-20 NOTE — PLAN OF CARE
Problem: Neurological Deficit  Goal: Neurological status is stable or improving  Description: Interventions:  - Monitor and assess patient's level of consciousness, motor function, sensory function, and level of assistance needed for ADLs.   - Monitor and report changes from baseline. Collaborate with interdisciplinary team to initiate plan and implement interventions as ordered.   - Provide and maintain a safe environment.  - Consider seizure precautions.  - Consider fall precautions.  - Consider aspiration precautions.  - Consider bleeding precautions.  Outcome: Progressing     Problem: Activity Intolerance/Impaired Mobility  Goal: Mobility/activity is maintained at optimum level for patient  Description: Interventions:  - Assess and monitor patient  barriers to mobility and need for assistive/adaptive devices.  - Assess patient's emotional response to limitations.  - Collaborate with interdisciplinary team and initiate plans and interventions as ordered.  - Encourage independent activity per ability.  - Maintain proper body alignment.  - Perform active/passive rom as tolerated/ordered.  - Plan activities to conserve energy.  - Turn patient as appropriate  Outcome: Progressing     Problem: Communication Impairment  Goal: Ability to express needs and understand communication  Description: Assess patient's communication skills and ability to understand information.  Patient will demonstrate use of effective communication techniques, alternative methods of communication and understanding even if not able to speak.     - Encourage communication and provide alternate methods of communication as needed.  - Collaborate with case management/ for discharge needs.  - Include patient/family/caregiver in decisions related to communication.  Outcome: Progressing     Problem: Potential for Aspiration  Goal: Non-ventilated patient's risk of aspiration is minimized  Description: Assess and monitor vital signs,  respiratory status, and labs (WBC).  Monitor for signs of aspiration (tachypnea, cough, rales, wheezing, cyanosis, fever).    - Assess and monitor patient's ability to swallow.  - Place patient up in chair to eat if possible.  - HOB up at 90 degrees to eat if unable to get patient up into chair.  - Supervise patient during oral intake.   - Instruct patient/ family to take small bites.  - Instruct patient/ family to take small single sips when taking liquids.  - Follow patient-specific strategies generated by speech pathologist.  Outcome: Progressing     Problem: Nutrition  Goal: Nutrition/Hydration status is improving  Description: Monitor and assess patient's nutrition/hydration status for malnutrition (ex- brittle hair, bruises, dry skin, pale skin and conjunctiva, muscle wasting, smooth red tongue, and disorientation). Collaborate with interdisciplinary team and initiate plan and interventions as ordered.  Monitor patient's weight and dietary intake as ordered or per policy. Utilize nutrition screening tool and intervene per policy. Determine patient's food preferences and provide high-protein, high-caloric foods as appropriate.     - Assist patient with eating.  - Allow adequate time for meals.  - Encourage patient to take dietary supplement as ordered.  - Collaborate with clinical nutritionist.  - Include patient/family/caregiver in decisions related to nutrition.  Outcome: Progressing     Problem: PAIN - ADULT  Goal: Verbalizes/displays adequate comfort level or baseline comfort level  Description: Interventions:  - Encourage patient to monitor pain and request assistance  - Assess pain using appropriate pain scale  - Administer analgesics based on type and severity of pain and evaluate response  - Implement non-pharmacological measures as appropriate and evaluate response  - Consider cultural and social influences on pain and pain management  - Notify physician/advanced practitioner if interventions  unsuccessful or patient reports new pain  Outcome: Progressing     Problem: INFECTION - ADULT  Goal: Absence or prevention of progression during hospitalization  Description: INTERVENTIONS:  - Assess and monitor for signs and symptoms of infection  - Monitor lab/diagnostic results  - Monitor all insertion sites, i.e. indwelling lines, tubes, and drains  - Monitor endotracheal if appropriate and nasal secretions for changes in amount and color  - [place] appropriate cooling/warming therapies per order  - Administer medications as ordered  - Instruct and encourage patient and family to use good hand hygiene technique  - Identify and instruct in appropriate isolation precautions for identified infection/condition  Outcome: Progressing     Problem: SAFETY ADULT  Goal: Patient will remain free of falls  Description: INTERVENTIONS:  - Educate patient/family on patient safety including physical limitations  - Instruct patient to call for assistance with activity   - Consult OT/PT to assist with strengthening/mobility   - Keep Call bell within reach  - Keep bed low and locked with side rails adjusted as appropriate  - Keep care items and personal belongings within reach  - Initiate and maintain comfort rounds  - Make Fall Risk Sign visible to staff  - Offer Toileting every 2 Hours, in advance of need  - Initiate/Maintain bed alarm  - Obtain necessary fall risk management equipment:   - Apply yellow socks and bracelet for high fall risk patients  - Consider moving patient to room near nurses station  Outcome: Progressing  Goal: Maintain or return to baseline ADL function  Description: INTERVENTIONS:  -  Assess patient's ability to carry out ADLs; assess patient's baseline for ADL function and identify physical deficits which impact ability to perform ADLs (bathing, care of mouth/teeth, toileting, grooming, dressing, etc.)  - Assess/evaluate cause of self-care deficits   - Assess range of motion  - Assess patient's  mobility; develop plan if impaired  - Assess patient's need for assistive devices and provide as appropriate  - Encourage maximum independence but intervene and supervise when necessary  - Involve family in performance of ADLs  - Assess for home care needs following discharge   - Consider OT consult to assist with ADL evaluation and planning for discharge  - Provide patient education as appropriate  Outcome: Progressing  Goal: Maintains/Returns to pre admission functional level  Description: INTERVENTIONS:  - Perform AM-PAC 6 Click Basic Mobility/ Daily Activity assessment daily.  - Set and communicate daily mobility goal to care team and patient/family/caregiver.   - Collaborate with rehabilitation services on mobility goals if consulted  - Perform Range of Motion 4 times a day.  - Reposition patient every 2 hours.  - Dangle patient 3 times a day  - Stand patient 3 times a day  - Ambulate patient 3 times a day  - Out of bed to chair 3 times a day   - Out of bed for meals 3 times a day  - Out of bed for toileting  - Record patient progress and toleration of activity level   Outcome: Progressing     Problem: DISCHARGE PLANNING  Goal: Discharge to home or other facility with appropriate resources  Description: INTERVENTIONS:  - Identify barriers to discharge w/patient and caregiver  - Arrange for needed discharge resources and transportation as appropriate  - Identify discharge learning needs (meds, wound care, etc.)  - Arrange for interpretive services to assist at discharge as needed  - Refer to Case Management Department for coordinating discharge planning if the patient needs post-hospital services based on physician/advanced practitioner order or complex needs related to functional status, cognitive ability, or social support system  Outcome: Progressing     Problem: Knowledge Deficit  Goal: Patient/family/caregiver demonstrates understanding of disease process, treatment plan, medications, and discharge  instructions  Description: Complete learning assessment and assess knowledge base.  Interventions:  - Provide teaching at level of understanding  - Provide teaching via preferred learning methods  Outcome: Progressing     Problem: Prexisting or High Potential for Compromised Skin Integrity  Goal: Skin integrity is maintained or improved  Description: INTERVENTIONS:  - Identify patients at risk for skin breakdown  - Assess and monitor skin integrity  - Assess and monitor nutrition and hydration status  - Monitor labs   - Assess for incontinence   - Turn and reposition patient  - Assist with mobility/ambulation  - Relieve pressure over bony prominences  - Avoid friction and shearing  - Provide appropriate hygiene as needed including keeping skin clean and dry  - Evaluate need for skin moisturizer/barrier cream  - Collaborate with interdisciplinary team   - Patient/family teaching  - Consider wound care consult   Outcome: Progressing

## 2024-05-20 NOTE — CONSULTS
[facility]  Consult: Critical Care  Name: Constanza Zhu 64 y.o. male I MRN: [id number]  Unit/Bed#: ICU 07 I Date of Admission: 5/20/2024   Date of Service: 5/20/2024 I Hospital Day: 0      Inpatient consult to Medical Critical Care  Consult performed by: Harleen Billingsley DO  Consult ordered by: Glenn Mattson MD      Assessment & Plan   Neuro:   Diagnosis: R carotid stent  CTA H/N 4/12/24: No acute intracranial abnormality. Chronic ischemic changes. No acute large vessel occlusion or dissection. 70% stenosis proximal right cervical ICA. Chronic severe stenosis of the distal right M1 at site of previously seen chronic occlusion.  Plan:   SBP <160, MAP >65  Continue ASA/Pletal/Brilinta   Neurochecks as per Neurosurgery  STAT CTH for any acute changes in neuro exam  Tylenol PRN for pain control  Baclofen 5 mg BID PRN  Neurosurgery managing    Diagnosis: Focal epilepsy  Home medication: Keppra 500 mg BID    Plan:   Continue home medications     Diagnosis: Memory Deficits  Home medication: Aricept 10 mg daily  Plan:   Continue home medications    Diagnosis: Anxiety and Depression  Home medication: Trazodone 100 mg daily  Plan:  Continue home medications     CV:   Diagnosis: HTN   Home medications: Losartan 50 mg daily, Metoprolol Succinate 100 mg daily, Hydralazine 25 mg q8h  Plan:  Continue home medications    Diagnosis: HLD  Home medication: Lipitor 40 mg  Plan:   Continue home medications    Pulm:  Diagnosis: As needed inhaler  Home medication: Albuterol PRN  Plan:   Continue home medications    GI:   Diagnosis: Bowel Regimen  Home medication: Miralax 17 g daily  Plan:   Continue home medications    Diagnosis: GERD  Home medication: Pantoprazole 40 mg daily   Plan:   Continue home medications     :   Diagnosis: BPH  Home medications: Tamsulosin 0.4 mg   Plan:   Continue home medications     F/E/N:     F: [place] 75@ml/hr  E: Replete as necessary  N: Advance diet as  appropriate    Heme/Onc:   Diagnosis: DVT prophylaxis   Plan:  Heparin 5000 units q8h      Endo:   No active issues    ID:   No active issues    MSK/Skin:   Diagnosis: S/p R carotid stent  Plan:   OOB as appropriate    Disposition: Critical care     History of Present Illness     HPI: Constanza Zhu is a 64 y.o. who presented to [facility] as a result of an elective procedure for a right carotid stent placement.  Patient has a past medical history of hypertension, memory deficits, left carotid stenosis status post stenting, GERD, urinary retention, stroke, headache, VTE, hypertriglyceridemia, and BPH.      Patient saw Dr. Mattson in the office on 5/1/24 and given the progressive stenosis in the R ICA, he was offered a R ICA stent. Of note, patient had his L ICA stented on 5/4/23.    At this time, patient has tolerated the procedure well but alert and oriented just to name. Patient states that he feels good. He denies any complaints or questions.    History obtained from chart review.    Review of Systems   Constitutional:  Negative for chills and fever.   Respiratory:  Negative for shortness of breath.    Cardiovascular:  Negative for chest pain.   Gastrointestinal:  Negative for nausea and vomiting.   Neurological:  Negative for speech difficulty, weakness, numbness and headaches.     Historical Information   Past Medical History:  No date: Depression  No date: Diabetes mellitus (HCC)      Comment:  patient denies  03/26/2019: Dyslipidemia  No date: GERD (gastroesophageal reflux disease)  No date: Hyperlipidemia  No date: Hypertension  No date: Prediabetes  04/03/2019: Prediabetes  No date: Stroke (HCC) Past Surgical History:  No date: ARTHROSCOPIC REPAIR ACL; Left  No date: BACK SURGERY      Comment:  twice  No date: CARPAL TUNNEL RELEASE; Right  05/04/2023: IR CEREBRAL ANGIOGRAPHY  03/19/2019: IR STROKE ALERT  No date: SHOULDER SURGERY; Left  11/21/2023: US GUIDED THYROID BIOPSY   Current  Outpatient Medications   Medication Instructions    acetaminophen (TYLENOL) 500 mg, Oral, As needed    albuterol (PROVENTIL HFA,VENTOLIN HFA) 90 mcg/act inhaler     aspirin (ECOTRIN LOW STRENGTH) 81 mg, Oral, Daily    atorvastatin (LIPITOR) 40 mg, Oral, Daily with breakfast    Baclofen 5 mg, Oral, 2 times daily PRN    cilostazol (PLETAL) 100 mg, Oral, 2 times daily before meals    donepezil (ARICEPT) 10 mg, Oral, Daily    hydrALAZINE (APRESOLINE) 25 mg tablet Only to be taken if diastolic bp 170 or over    hydroCHLOROthiazide 12.5 mg tablet     levETIRAcetam (Keppra) 500 mg tablet Take 1 tab (500 mg) twice a day for 1 week, then take 2 tabs (1000 mg) twice a day.    losartan (COZAAR) 50 mg, Oral, Daily    MELATONIN GUMMIES PO 5 mg, Oral, Daily at bedtime    metoprolol succinate (TOPROL-XL) 100 mg, Oral, Daily    pantoprazole (PROTONIX) 40 mg, Oral, Daily    polyethylene glycol (MIRALAX) 17 g, Oral, Daily    tamsulosin (FLOMAX) 0.4 mg, Oral, Daily with dinner    ticagrelor (BRILINTA) 90 mg, Oral, Every 12 hours scheduled    traZODone (DESYREL) 100 mg, Oral, Daily at bedtime    Allergies   Allergen Reactions    Flexeril [Cyclobenzaprine] Drowsiness    Lisinopril Cough    Nsaids Confusion      Social History     Tobacco Use    Smoking status: Former    Smokeless tobacco: Never   Vaping Use    Vaping status: Never Used   Substance Use Topics    Alcohol use: Never    Drug use: Never    Family History   Problem Relation Age of Onset    Hyperlipidemia Mother     Hypertension Mother     Diabetes unspecified Mother         prediabetes    Heart attack Mother     Diabetes Mother     Hyperlipidemia Father     Hypertension Father     Prostate cancer Father     Stroke Father     Hyperlipidemia Sister     Hypertension Sister     Hyperlipidemia Sister     Hypertension Sister     Depression Sister     Hypertension Sister     Hyperlipidemia Sister     Depression Sister     Hyperlipidemia Brother     Hypertension Brother      Hypertension Brother     Prostate cancer Brother     Hypothyroidism Daughter     Hypothyroidism Son     Diabetes unspecified Son     Seizures Neg Hx           Objective                            Vitals I/O      Most Recent Min/Max in 24hrs   Temp 98.7 °F (37.1 °C) Temp  Min: 98.1 °F (36.7 °C)  Max: 98.7 °F (37.1 °C)   Pulse 60 Pulse  Min: 60  Max: 82   Resp 14 Resp  Min: 12  Max: 16   /50 BP  Min: 100/50  Max: 145/64   O2 Sat 98 % SpO2  Min: 98 %  Max: 99 %      Intake/Output Summary (Last 24 hours) at 5/20/2024 1133  Last data filed at 5/20/2024 0927  Gross per 24 hour   Intake 400 ml   Output --   Net 400 ml       No diet orders on file    Invasive Monitoring   Arterial Line  [place] /42  Arterial Line BP  Min: 98/36  Max: 120/48   MAP (!) 62 mmHg  Arterial Line MAP (mmHg)  Min: 56 mmHg  Max: 74 mmHg           Physical Exam   Physical Exam  Vitals reviewed.   Eyes:      Extraocular Movements: Extraocular movements intact.      Conjunctiva/sclera: Conjunctivae normal.   Skin:     General: Skin is warm.   HENT:      Head: Normocephalic and atraumatic.      Mouth/Throat:      Mouth: Mucous membranes are moist.   Cardiovascular:      Rate and Rhythm: Normal rate and regular rhythm.   Abdominal: General: Bowel sounds are normal.      Palpations: Abdomen is soft.      Tenderness: There is no abdominal tenderness.   Constitutional:       Appearance: He is well-developed and well-nourished.   Pulmonary:      Effort: Pulmonary effort is normal.      Breath sounds: Normal breath sounds.   Neurological:      Mental Status: He is disoriented to person, disoriented to place and disoriented to time.      Cranial Nerves: No facial asymmetry.      Motor: Strength full and intact in all extremities.      Comments: Mild stutter noted with speech            Diagnostic Studies      EKG: Sinus Rhythm  Imaging: I have personally reviewed pertinent reports.   and I have personally reviewed pertinent films in PACS      Medications:  Scheduled PRN   [START ON 5/21/2024] aspirin, 81 mg, Daily  [START ON 5/21/2024] atorvastatin, 40 mg, Daily With Breakfast  chlorhexidine, 15 mL, Q12H JOSE  [START ON 5/21/2024] cilostazol, 100 mg, BID AC  donepezil, 10 mg, Daily  [START ON 5/21/2024] heparin (porcine), 5,000 Units, Q8H JOSE  hydrALAZINE, 25 mg, Q8H JOSE  levETIRAcetam, 500 mg, BID  [START ON 5/21/2024] metoprolol succinate, 100 mg, Daily  tamsulosin, 0.4 mg, Daily With Dinner  ticagrelor, 90 mg, Q12H JOSE      albuterol, 2 puff, Q6H PRN  baclofen, 5 mg, BID PRN  labetalol, 5 mg, Q15 Min PRN   And  hydrALAZINE, 15 mg, Q15 Min PRN   And  niCARdipine, 1-15 mg/hr, Continuous PRN  sodium chloride, 500 mL, Once PRN   Followed by  [START ON 5/21/2024] phenylephine,  mcg/min, Continuous PRN       Continuous    niCARdipine, 1-15 mg/hr  [START ON 5/21/2024] phenylephine,  mcg/min  sodium chloride, 75 mL/hr, Last Rate: 75 mL/hr (05/20/24 0731)         Labs:    CBC    Recent Labs     05/20/24  0730 05/20/24  1017   WBC 8.41  --    HGB 14.3  --    HCT 43.3  --     226     BMP    Recent Labs     05/20/24  0730   SODIUM 138   K 3.7      CO2 28   AGAP 7   BUN 28*   CREATININE 1.10   CALCIUM 9.6       Coags    Recent Labs     05/20/24  0730   INR 1.03   PTT 28        Additional Electrolytes  No recent results       Blood Gas    No recent results  No recent results LFTs  Recent Labs     05/20/24  0730   ALT 15   AST 14   ALKPHOS 92   ALB 4.5   TBILI 0.45       Infectious  No recent results  Glucose  Recent Labs     05/20/24  0730   GLUC 92                Harleen Billingsley, DO

## 2024-05-20 NOTE — ANESTHESIA POSTPROCEDURE EVALUATION
Post-Op Assessment Note    CV Status:  Stable  Pain Score: 0    Pain management: adequate       Mental Status:  Alert and awake   Hydration Status:  Euvolemic   PONV Controlled:  Controlled   Airway Patency:  Patent  Two or more mitigation strategies used for obstructive sleep apnea   Post Op Vitals Reviewed: Yes    No anethesia notable event occurred.    Staff: CRNA               BP   148/70         Pulse  85   Resp   14   SpO2   98%

## 2024-05-20 NOTE — PLAN OF CARE
Problem: Neurological Deficit  Goal: Neurological status is stable or improving  Description: Interventions:  - Monitor and assess patient's level of consciousness, motor function, sensory function, and level of assistance needed for ADLs.   - Monitor and report changes from baseline. Collaborate with interdisciplinary team to initiate plan and implement interventions as ordered.   - Provide and maintain a safe environment.  - Consider seizure precautions.  - Consider fall precautions.  - Consider aspiration precautions.  - Consider bleeding precautions.  5/20/2024 1016 by Althea Bolden RN  Outcome: Progressing  5/20/2024 1004 by Althea Bolden, RN  Outcome: Progressing

## 2024-05-20 NOTE — SEDATION DOCUMENTATION
Pt in IR for cerebral angiogram with R carotid stent performed by Dr Mattson. Anesthesia present throughout case. Pt tolerated procedure well. R groin accessed. Mynx and dry dressing to R groin. Report given to ICU RN. Pt transported to ICU with RN and CRNA. Upon transfer to ICU bed pt R groin dressing CDI.

## 2024-05-21 ENCOUNTER — TELEPHONE (OUTPATIENT)
Dept: NEUROSURGERY | Facility: CLINIC | Age: 64
End: 2024-05-21

## 2024-05-21 VITALS
BODY MASS INDEX: 31.5 KG/M2 | TEMPERATURE: 98.5 F | HEIGHT: 66 IN | RESPIRATION RATE: 16 BRPM | DIASTOLIC BLOOD PRESSURE: 65 MMHG | OXYGEN SATURATION: 95 % | WEIGHT: 195.99 LBS | SYSTOLIC BLOOD PRESSURE: 151 MMHG | HEART RATE: 81 BPM

## 2024-05-21 PROBLEM — I65.21 CAROTID STENOSIS, RIGHT: Status: ACTIVE | Noted: 2024-05-21

## 2024-05-21 LAB
ANION GAP SERPL CALCULATED.3IONS-SCNC: 9 MMOL/L (ref 4–13)
APTT PPP: 30 SECONDS (ref 23–37)
BUN SERPL-MCNC: 24 MG/DL (ref 5–25)
CALCIUM SERPL-MCNC: 8.8 MG/DL (ref 8.4–10.2)
CHLORIDE SERPL-SCNC: 106 MMOL/L (ref 96–108)
CO2 SERPL-SCNC: 24 MMOL/L (ref 21–32)
CREAT SERPL-MCNC: 0.89 MG/DL (ref 0.6–1.3)
ERYTHROCYTE [DISTWIDTH] IN BLOOD BY AUTOMATED COUNT: 13.2 % (ref 11.6–15.1)
GFR SERPL CREATININE-BSD FRML MDRD: 89 ML/MIN/1.73SQ M
GLUCOSE SERPL-MCNC: 91 MG/DL (ref 65–140)
HCT VFR BLD AUTO: 37.8 % (ref 36.5–49.3)
HGB BLD-MCNC: 12.4 G/DL (ref 12–17)
INR PPP: 1.1 (ref 0.84–1.19)
KCT BLD-ACNC: 137 SEC (ref 89–137)
KCT BLD-ACNC: 193 SEC (ref 89–137)
KCT BLD-ACNC: 205 SEC (ref 89–137)
MAGNESIUM SERPL-MCNC: 2 MG/DL (ref 1.9–2.7)
MCH RBC QN AUTO: 31.9 PG (ref 26.8–34.3)
MCHC RBC AUTO-ENTMCNC: 32.8 G/DL (ref 31.4–37.4)
MCV RBC AUTO: 97 FL (ref 82–98)
PHOSPHATE SERPL-MCNC: 3 MG/DL (ref 2.3–4.1)
PLATELET # BLD AUTO: 221 THOUSANDS/UL (ref 149–390)
PMV BLD AUTO: 10.1 FL (ref 8.9–12.7)
POTASSIUM SERPL-SCNC: 3.7 MMOL/L (ref 3.5–5.3)
PROTHROMBIN TIME: 14.1 SECONDS (ref 11.6–14.5)
RBC # BLD AUTO: 3.89 MILLION/UL (ref 3.88–5.62)
SODIUM SERPL-SCNC: 139 MMOL/L (ref 135–147)
SPECIMEN SOURCE: ABNORMAL
SPECIMEN SOURCE: ABNORMAL
SPECIMEN SOURCE: NORMAL
WBC # BLD AUTO: 10.24 THOUSAND/UL (ref 4.31–10.16)

## 2024-05-21 PROCEDURE — 99024 POSTOP FOLLOW-UP VISIT: CPT | Performed by: NEUROLOGICAL SURGERY

## 2024-05-21 PROCEDURE — 83735 ASSAY OF MAGNESIUM: CPT

## 2024-05-21 PROCEDURE — 80048 BASIC METABOLIC PNL TOTAL CA: CPT | Performed by: NEUROLOGICAL SURGERY

## 2024-05-21 PROCEDURE — 99291 CRITICAL CARE FIRST HOUR: CPT | Performed by: EMERGENCY MEDICINE

## 2024-05-21 PROCEDURE — 85027 COMPLETE CBC AUTOMATED: CPT | Performed by: NEUROLOGICAL SURGERY

## 2024-05-21 PROCEDURE — 84100 ASSAY OF PHOSPHORUS: CPT

## 2024-05-21 PROCEDURE — 85730 THROMBOPLASTIN TIME PARTIAL: CPT | Performed by: NEUROLOGICAL SURGERY

## 2024-05-21 PROCEDURE — 85610 PROTHROMBIN TIME: CPT | Performed by: NEUROLOGICAL SURGERY

## 2024-05-21 RX ORDER — HEPARIN SODIUM 5000 [USP'U]/ML
5000 INJECTION, SOLUTION INTRAVENOUS; SUBCUTANEOUS EVERY 8 HOURS SCHEDULED
Status: DISCONTINUED | OUTPATIENT
Start: 2024-05-21 | End: 2024-05-21 | Stop reason: HOSPADM

## 2024-05-21 RX ORDER — POTASSIUM CHLORIDE 20 MEQ/1
40 TABLET, EXTENDED RELEASE ORAL ONCE
Status: COMPLETED | OUTPATIENT
Start: 2024-05-21 | End: 2024-05-21

## 2024-05-21 RX ADMIN — CILOSTAZOL 100 MG: 100 TABLET ORAL at 08:59

## 2024-05-21 RX ADMIN — DONEPEZIL HYDROCHLORIDE 10 MG: 10 TABLET ORAL at 09:00

## 2024-05-21 RX ADMIN — SODIUM CHLORIDE 500 ML: 0.9 INJECTION, SOLUTION INTRAVENOUS at 02:15

## 2024-05-21 RX ADMIN — ATORVASTATIN CALCIUM 40 MG: 40 TABLET, FILM COATED ORAL at 08:59

## 2024-05-21 RX ADMIN — LEVETIRACETAM 500 MG: 500 TABLET, FILM COATED ORAL at 09:01

## 2024-05-21 RX ADMIN — HEPARIN SODIUM 5000 UNITS: 5000 INJECTION INTRAVENOUS; SUBCUTANEOUS at 07:00

## 2024-05-21 RX ADMIN — HYDRALAZINE HYDROCHLORIDE 25 MG: 25 TABLET, FILM COATED ORAL at 14:41

## 2024-05-21 RX ADMIN — POTASSIUM CHLORIDE 40 MEQ: 1500 TABLET, EXTENDED RELEASE ORAL at 09:01

## 2024-05-21 RX ADMIN — ASPIRIN 81 MG: 81 TABLET, COATED ORAL at 09:01

## 2024-05-21 RX ADMIN — HEPARIN SODIUM 5000 UNITS: 5000 INJECTION INTRAVENOUS; SUBCUTANEOUS at 14:41

## 2024-05-21 RX ADMIN — METOPROLOL SUCCINATE 100 MG: 100 TABLET, EXTENDED RELEASE ORAL at 09:01

## 2024-05-21 RX ADMIN — TICAGRELOR 90 MG: 90 TABLET ORAL at 08:59

## 2024-05-21 RX ADMIN — SODIUM CHLORIDE, SODIUM GLUCONATE, SODIUM ACETATE, POTASSIUM CHLORIDE, MAGNESIUM CHLORIDE, SODIUM PHOSPHATE, DIBASIC, AND POTASSIUM PHOSPHATE 75 ML/HR: .53; .5; .37; .037; .03; .012; .00082 INJECTION, SOLUTION INTRAVENOUS at 07:40

## 2024-05-21 RX ADMIN — PANTOPRAZOLE SODIUM 40 MG: 40 TABLET, DELAYED RELEASE ORAL at 07:00

## 2024-05-21 RX ADMIN — HYDRALAZINE HYDROCHLORIDE 25 MG: 25 TABLET, FILM COATED ORAL at 07:00

## 2024-05-21 NOTE — ASSESSMENT & PLAN NOTE
PPD 1 right carotid stent placement  History of right MCA stroke and thrombectomy in 2019 then presented in May 2023 with new left MCA stroke and significant bilateral carotid stenosis.   History of left ICA stenting on 5/4/2023 by Dr. Mattson.  He was started on aspirin and Brilinta at that time.  Also noted to have asymptomatic right carotid stenosis.  Since then patient had another small stroke while off triple therapy, he has since been back on triple therapy.  Earlier this month in the office patient noted to have progressive right carotid stenosis.    No new imaging to review     Plan:  Continue frequent neurological checks  STAT CT/CTA head and neck with any neurological decline including drop in GCS of 2 points within 1 hour  Recommend SBP<160 and MAPs >65  Continue aspirin, Brilinta and Pletal as ordered  Pain well-controlled on current regimen  Mobilize patient as tolerated  DVT PPX: SCDs and heparin SQ    Neurosurgery will continue to follow as primary, patient is medically stable for discharge home today.  He will follow-up in 2 weeks with carotid Dopplers, call with any further questions or concerns.

## 2024-05-21 NOTE — DISCHARGE INSTR - AVS FIRST PAGE
Today, you underwent a diagnostic cerebral angiogram under the care of Dr. Glenn Mattson    The following instructions will help you care for yourself, or be cared for upon your return home today. These are guidelines for your care right after your surgery only.      Notify Your Doctor or Nurse if you have any of the following:    SYMPTOMS OF WOUND INFECTION--   Increased pain in or around the incision   Swelling around the incision  Any drainage from the incision  Incision separates or opens up  Warmth in the tissues around the incision  Redness or tenderness on the skin near the incision   Fever (temperature greater than 101 degrees F)     NEUROLOGICAL CHANGES--  Change in alertness  Increased sleepiness   Nausea and vomiting   New onset of numbness or weakness in arms or legs   New problems with your bowels or bladder  New or worse problems with balance or walking  Seizures, new or worsening    UNRELIEVED HEADACHE PAIN--  New or increased pain unrelieved with pain medications   Pain associated with nausea and vomiting   Pain associated with other symptoms    QUESTIONS OR PROBLEMS--  Any questions or problems that you are unsure about    Wound Care:    Keep Incision Clean and Dry   You may shower daily, but do not soak incision. Pat dry after showering.   No tub baths, soaking, swimming for 1 week after angiogram.   You do not need to cover the incision. Mild to moderate bruising and tenderness to the site is expected and may last up to 1-2 weeks after your procedure.     A closure device was placed at the catheter insertion site. This is MRI compatible. Remove the dressing 24 hours after your procedure.     If your groin site is bleeding, apply firm pressure for 10 minutes. Reinforce dressing rather than removing and checking frequently. If continues to bleed through the dressing after 1 hour, contact your neurosurgeon's office.     Anticipatory Education:    PAIN MED W/ Acetaminophen (Tylenol)  --IF a  prescription for pain medicine has been sent home with you:  --Narcotic pain medication may cause constipation. Be sure to take stool softeners or laxatives while you are on narcotic pain medication.   --Do not drive after taking prescription pain medicine.     If this medicine is too strong, or no longer necessary, or we did NOT recommend/prescribe oral narcotics, you may take:   - Tylenol Extra-strength/Acetaminophen, 2 tablets every 4-6 hours as needed for mild pain. DO NOT TAKE MORE THAN 4000MG PER DAY from combined sources. NOTE: Remember to eat when taking pain medicines in order to avoid nausea. Watch for constipation. Eat plenty of fruits, vegetables, juices, and drink 6-8 glasses of water each day.     Constipation: Stay active and drink at least 6-8 cups of fluid each day to prevent constipation. If you need a laxative or stool softener follow the package directions or consult with your local pharmacists if you have questions.    After anesthesia, rest for 24 hours. Do not drive, drink alcohol beverages or make any important decisions during this time. General anesthesia may cause sore throat, jaw discomfort or muscle aches. These symptoms can last for one or two days.    Activity:  Please follow these instructions:  Advance your activity as you can tolerate. You may do light house work; nothing strenuous   You may walk all you want. You may go up and down the steps. Use the railing for support  Do not do excessive bending, straining or heavy lifting for 48 hours after your procedure  Do not drive or return to work until you are instructed   It is normal for your energy level and sleep patterns to change after surgery.   Get extra sleep at night and take naps during the day to help you feel less tired.   Take rest periods during the day.   Complete recovery may take several weeks.    You may resume driving after 24-48 hours recovery.   You may return to work after 48 hours of recovery.     Diet:  Your  doctor has recommended that you follow these diet instructions at home. Refer to the patient education materials you received during your hospital stay. If you would like more nutrition counseling, ask your doctor about making an appointment with an outpatient dietitian.  Resume your home diet    Medications:  Please resume your home medications as instructed.       Home Supplies and Equipment:  none    Additional Contacts:    CONTACTS FOR NEUROSURGERY: You may call your neurosurgeon’s office if you have questions between 8:30 am and 4:30 pm. You may request to speak to the nurse practitioner who is available Monday through Friday.     For off hours or the weekend you may call your neurosurgeon's office to leave a message.    Continue aspirin, Pletal, and Brilinta as ordered    Please follow-up in 2 weeks with carotid Dopplers

## 2024-05-21 NOTE — PLAN OF CARE
Problem: Neurological Deficit  Goal: Neurological status is stable or improving  Description: Interventions:  - Monitor and assess patient's level of consciousness, motor function, sensory function, and level of assistance needed for ADLs.   - Monitor and report changes from baseline. Collaborate with interdisciplinary team to initiate plan and implement interventions as ordered.   - Provide and maintain a safe environment.  - Consider seizure precautions.  - Consider fall precautions.  - Consider aspiration precautions.  - Consider bleeding precautions.  Outcome: Progressing     Problem: Activity Intolerance/Impaired Mobility  Goal: Mobility/activity is maintained at optimum level for patient  Description: Interventions:  - Assess and monitor patient  barriers to mobility and need for assistive/adaptive devices.  - Assess patient's emotional response to limitations.  - Collaborate with interdisciplinary team and initiate plans and interventions as ordered.  - Encourage independent activity per ability.  - Maintain proper body alignment.  - Perform active/passive rom as tolerated/ordered.  - Plan activities to conserve energy.  - Turn patient as appropriate  Outcome: Progressing     Problem: Communication Impairment  Goal: Ability to express needs and understand communication  Description: Assess patient's communication skills and ability to understand information.  Patient will demonstrate use of effective communication techniques, alternative methods of communication and understanding even if not able to speak.     - Encourage communication and provide alternate methods of communication as needed.  - Collaborate with case management/ for discharge needs.  - Include patient/family/caregiver in decisions related to communication.  Outcome: Progressing     Problem: Potential for Aspiration  Goal: Non-ventilated patient's risk of aspiration is minimized  Description: Assess and monitor vital signs,  respiratory status, and labs (WBC).  Monitor for signs of aspiration (tachypnea, cough, rales, wheezing, cyanosis, fever).    - Assess and monitor patient's ability to swallow.  - Place patient up in chair to eat if possible.  - HOB up at 90 degrees to eat if unable to get patient up into chair.  - Supervise patient during oral intake.   - Instruct patient/ family to take small bites.  - Instruct patient/ family to take small single sips when taking liquids.  - Follow patient-specific strategies generated by speech pathologist.  Outcome: Progressing     Problem: Nutrition  Goal: Nutrition/Hydration status is improving  Description: Monitor and assess patient's nutrition/hydration status for malnutrition (ex- brittle hair, bruises, dry skin, pale skin and conjunctiva, muscle wasting, smooth red tongue, and disorientation). Collaborate with interdisciplinary team and initiate plan and interventions as ordered.  Monitor patient's weight and dietary intake as ordered or per policy. Utilize nutrition screening tool and intervene per policy. Determine patient's food preferences and provide high-protein, high-caloric foods as appropriate.     - Assist patient with eating.  - Allow adequate time for meals.  - Encourage patient to take dietary supplement as ordered.  - Collaborate with clinical nutritionist.  - Include patient/family/caregiver in decisions related to nutrition.  Outcome: Progressing     Problem: PAIN - ADULT  Goal: Verbalizes/displays adequate comfort level or baseline comfort level  Description: Interventions:  - Encourage patient to monitor pain and request assistance  - Assess pain using appropriate pain scale  - Administer analgesics based on type and severity of pain and evaluate response  - Implement non-pharmacological measures as appropriate and evaluate response  - Consider cultural and social influences on pain and pain management  - Notify physician/advanced practitioner if interventions  unsuccessful or patient reports new pain  Outcome: Progressing     Problem: INFECTION - ADULT  Goal: Absence or prevention of progression during hospitalization  Description: INTERVENTIONS:  - Assess and monitor for signs and symptoms of infection  - Monitor lab/diagnostic results  - Monitor all insertion sites, i.e. indwelling lines, tubes, and drains  - Monitor endotracheal if appropriate and nasal secretions for changes in amount and color  - Wisner appropriate cooling/warming therapies per order  - Administer medications as ordered  - Instruct and encourage patient and family to use good hand hygiene technique  - Identify and instruct in appropriate isolation precautions for identified infection/condition  Outcome: Progressing     Problem: SAFETY ADULT  Goal: Patient will remain free of falls  Description: INTERVENTIONS:  - Educate patient/family on patient safety including physical limitations  - Instruct patient to call for assistance with activity   - Consult OT/PT to assist with strengthening/mobility   - Keep Call bell within reach  - Keep bed low and locked with side rails adjusted as appropriate  - Keep care items and personal belongings within reach  - Initiate and maintain comfort rounds  - Apply yellow socks and bracelet for high fall risk patients  - Consider moving patient to room near nurses station  Outcome: Progressing  Goal: Maintain or return to baseline ADL function  Description: INTERVENTIONS:  -  Assess patient's ability to carry out ADLs; assess patient's baseline for ADL function and identify physical deficits which impact ability to perform ADLs (bathing, care of mouth/teeth, toileting, grooming, dressing, etc.)  - Assess/evaluate cause of self-care deficits   - Assess range of motion  - Assess patient's mobility; develop plan if impaired  - Assess patient's need for assistive devices and provide as appropriate  - Encourage maximum independence but intervene and supervise when  necessary  - Involve family in performance of ADLs  - Assess for home care needs following discharge   - Consider OT consult to assist with ADL evaluation and planning for discharge  - Provide patient education as appropriate  Outcome: Progressing  Goal: Maintains/Returns to pre admission functional level  Description: INTERVENTIONS:  - Perform AM-PAC 6 Click Basic Mobility/ Daily Activity assessment daily.  - Set and communicate daily mobility goal to care team and patient/family/caregiver.   - Collaborate with rehabilitation services on mobility goals if consulted  - Out of bed for toileting  - Record patient progress and toleration of activity level   Outcome: Progressing     Problem: DISCHARGE PLANNING  Goal: Discharge to home or other facility with appropriate resources  Description: INTERVENTIONS:  - Identify barriers to discharge w/patient and caregiver  - Arrange for needed discharge resources and transportation as appropriate  - Identify discharge learning needs (meds, wound care, etc.)  - Arrange for interpretive services to assist at discharge as needed  - Refer to Case Management Department for coordinating discharge planning if the patient needs post-hospital services based on physician/advanced practitioner order or complex needs related to functional status, cognitive ability, or social support system  Outcome: Progressing     Problem: Knowledge Deficit  Goal: Patient/family/caregiver demonstrates understanding of disease process, treatment plan, medications, and discharge instructions  Description: Complete learning assessment and assess knowledge base.  Interventions:  - Provide teaching at level of understanding  - Provide teaching via preferred learning methods  Outcome: Progressing     Problem: Prexisting or High Potential for Compromised Skin Integrity  Goal: Skin integrity is maintained or improved  Description: INTERVENTIONS:  - Identify patients at risk for skin breakdown  - Assess and  monitor skin integrity  - Assess and monitor nutrition and hydration status  - Monitor labs   - Assess for incontinence   - Turn and reposition patient  - Assist with mobility/ambulation  - Relieve pressure over bony prominences  - Avoid friction and shearing  - Provide appropriate hygiene as needed including keeping skin clean and dry  - Evaluate need for skin moisturizer/barrier cream  - Collaborate with interdisciplinary team   - Patient/family teaching  - Consider wound care consult   Outcome: Progressing

## 2024-05-21 NOTE — UTILIZATION REVIEW
Initial Clinical Review    Elective inpatient  surgical procedure    Age/Sex: 64 y.o. male    SURGERY DATE: 05/20/24      Preop Diagnosis:  1. Right internal carotid artery stenosis  2. History of right MCA stroke  3. Prior left DARRELL     Postop Diagnosis  1. Right internal carotid artery stenosis  2. History of right MCA stroke  3. Prior left DARRELL     Procedure:  Right Common Carotid Arteriogram  Left Common Carotid Arteriogram  RIGHT ICA angioplasty and stenting with distal protection device  Intraprocedural and postprocedural arteriography  Slow infusion of 10 mg of verapamil into the right internal carotid artery over 10 minutes for the treatment of vasospasm  Limited Right Femoral Arteriogram    Anesthesia Type:   Monitored Anesthesia Care      Complications:  None     Operative Indications:  64 y.o. male  who suffered from a previous right  MCA stroke.  After discussing the risks and benefits of a diagnostic cerebral arteriogram including bleeding, stroke, groin hematoma, and death the patient elected to proceed.   Impression:   1. Successful right carotid artery stenting below the level of C1.     POD#1 Progress Note: 5- 21-24 inpatient critical care  SBP< 160 MAP > 65. Neuro exam : + tremors and forgetful.  Oriented to self but able to re orient to place and time.  Discontinue neuro checks.  Continue aspirin, pletal and brilinta.  Continue keppra due to history of epilepsy.  No reported pain. Start heparin sq today. Discontinue iv fluids.     Admission Orders: Date/Time/Statement:   Admission Orders (From admission, onward)       Ordered        05/20/24 0946  Inpatient Admission  Once                          Orders Placed This Encounter   Procedures    Inpatient Admission     Standing Status:   Standing     Number of Occurrences:   1     Order Specific Question:   Level of Care     Answer:   Critical Care [15]     Order Specific Question:   Estimated length of stay     Answer:   Inpatient Only Surgery  "    Vital Signs: /52 (BP Location: Right arm)   Pulse 92   Temp 98.5 °F (36.9 °C) (Oral)   Resp 16   Ht 5' 6\" (1.676 m)   Wt 88.9 kg (195 lb 15.8 oz)   SpO2 95%   BMI 31.63 kg/m²     Pertinent Labs/Diagnostic Test Results:         Results from last 7 days   Lab Units 05/21/24  0747 05/20/24  1017 05/20/24  0730   WBC Thousand/uL 10.24*  --  8.41   HEMOGLOBIN g/dL 12.4  --  14.3   HEMATOCRIT % 37.8  --  43.3   PLATELETS Thousands/uL 221 226 263         Results from last 7 days   Lab Units 05/21/24  0747 05/20/24  0730   SODIUM mmol/L 139 138   POTASSIUM mmol/L 3.7 3.7   CHLORIDE mmol/L 106 103   CO2 mmol/L 24 28   ANION GAP mmol/L 9 7   BUN mg/dL 24 28*   CREATININE mg/dL 0.89 1.10   EGFR ml/min/1.73sq m 89 70   CALCIUM mg/dL 8.8 9.6   MAGNESIUM mg/dL 2.0  --    PHOSPHORUS mg/dL 3.0  --      Results from last 7 days   Lab Units 05/20/24  0730   AST U/L 14   ALT U/L 15   ALK PHOS U/L 92   TOTAL PROTEIN g/dL 7.6   ALBUMIN g/dL 4.5   TOTAL BILIRUBIN mg/dL 0.45         Results from last 7 days   Lab Units 05/21/24  0747 05/20/24  0730   GLUCOSE RANDOM mg/dL 91 92     Results from last 7 days   Lab Units 05/21/24  0747 05/20/24  0730   PROTIME seconds 14.1 13.4   INR  1.10 1.03   PTT seconds 30 28         Diet: regular  Mobility: ambulatory   DVT Prophylaxis: sequential compression device only    Medications/Pain Control: none    Scheduled Medications:  aspirin, 81 mg, Oral, Daily  atorvastatin, 40 mg, Oral, Daily With Breakfast  chlorhexidine, 15 mL, Mouth/Throat, Q12H JOSE  cilostazol, 100 mg, Oral, BID AC  donepezil, 10 mg, Oral, Daily  heparin (porcine), 5,000 Units, Subcutaneous, Q8H JOSE  hydrALAZINE, 25 mg, Oral, Q8H JOSE  levETIRAcetam, 500 mg, Oral, BID  metoprolol succinate, 100 mg, Oral, Daily  pantoprazole, 40 mg, Oral, Early Morning  polyethylene glycol, 17 g, Oral, Daily  tamsulosin, 0.4 mg, Oral, Daily With Dinner  ticagrelor, 90 mg, Oral, Q12H JOSE  traZODone, 100 mg, Oral, HS      Continuous IV " Infusions:  multi-electrolyte, 75 mL/hr, Intravenous, Continuous  niCARdipine, 1-15 mg/hr, Intravenous, Continuous PRN  phenylephine,  mcg/min, Intravenous, Continuous PRN      PRN Meds:  albuterol, 2 puff, Inhalation, Q6H PRN  baclofen, 5 mg, Oral, BID PRN  labetalol, 5 mg, Intravenous, Q15 Min PRN   And  hydrALAZINE, 15 mg, Intravenous, Q15 Min PRN   And  niCARdipine, 1-15 mg/hr, Intravenous, Continuous PRN  phenylephine,  mcg/min, Intravenous, Continuous PRN        Network Utilization Review Department  ATTENTION: Please call with any questions or concerns to [phone] and carefully listen to the prompts so that you are directed to the right person. All voicemails are confidential.   For Discharge needs, contact Care Management DC Support Team at [phone] opt. [phone]  Send all requests for admission clinical reviews, approved or denied determinations and any other requests to dedicated fax number below belonging to the [facility] where the patient is receiving treatment. List of dedicated fax numbers for the Facilities:  FACILITY NAME UR FAX NUMBER   ADMISSION DENIALS (Administrative/Medical Necessity) [phone]   DISCHARGE SUPPORT TEAM (NETWORK) [phone]   PARENT CHILD HEALTH (Maternity/NICU/Pediatrics) [phone]   [facility] [phone]   [facility] [phone]   [facility], [facility] [phone]   [facility] [phone]   [facility] [phone]   [facility] [phone]   [facility] [phone]   [facility] [phone]   [facility] [phone]   [facility] [phone]   [facility] [phone]   [facility] [phone]

## 2024-05-21 NOTE — ASSESSMENT & PLAN NOTE
Prior R MCA thrombectomy in 2019  Presented in May 2023 with new left MCA stroke and significant bilateral carotid stenosis.  See above plan

## 2024-05-21 NOTE — PROGRESS NOTES
[facility]  Progress Note: Critical Care  Name: Constanza Zhu 64 y.o. male I MRN: [id number]  Unit/Bed#: [facility] 722-01 I Date of Admission: 5/20/2024   Date of Service: 5/21/2024 I Hospital Day: 1    Assessment & Plan   Neuro:   Diagnosis: R carotid stent  CTA H/N 4/12/24: No acute intracranial abnormality. Chronic ischemic changes. No acute large vessel occlusion or dissection. 70% stenosis proximal right cervical ICA. Chronic severe stenosis of the distal right M1 at site of previously seen chronic occlusion.  Plan:   SBP <160, MAP >65  Continue ASA/Pletal/Brilinta   Neurochecks as per Neurosurgery  STAT CTH for any acute changes in neuro exam  Tylenol PRN for pain control  Baclofen 5 mg BID PRN  Neurosurgery managing     Diagnosis: Focal epilepsy  Home medication: Keppra 500 mg BID    Plan:   Continue home medications      Diagnosis: Memory Deficits  Home medication: Aricept 10 mg daily  Plan:   Continue home medications     Diagnosis: Anxiety and Depression  Home medication: Trazodone 100 mg daily  Plan:  Continue home medications      CV:   Diagnosis: HTN   Home medications: Losartan 50 mg daily, Metoprolol Succinate 100 mg daily, Hydralazine 25 mg q8h  Plan:  Continue home medications     Diagnosis: HLD  Home medication: Lipitor 40 mg  Plan:   Continue home medications     Pulm:  Diagnosis: As needed inhaler  Home medication: Albuterol PRN  Plan:   Continue home medications     GI:   Diagnosis: Bowel Regimen  Home medication: Miralax 17 g daily  Plan:   Continue home medications     Diagnosis: GERD  Home medication: Pantoprazole 40 mg daily   Plan:   Continue home medications      :   Diagnosis: BPH  Home medications: Tamsulosin 0.4 mg   Plan:   Continue home medications      F/E/N:      F: Stop IV crystalloids  E: Replete as necessary  N: Advance to cardiac diet if passes bedside swallow     Heme/Onc:   Diagnosis: DVT prophylaxis   Plan:  Heparin 5000 units q8h        Endo:   No active issues     ID:   No active issues     MSK/Skin:   Diagnosis: S/p R carotid stent  Plan:   OOB as appropriate  Disposition: Stepdown Level 2    ICU Core Measures     A: Assess, Prevent, and Manage Pain Has pain been assessed? Yes  Need for changes to pain regimen? No   B: Both SAT/SAT  N/A   C: Choice of Sedation RASS Goal: 0 Alert and Calm  Need for changes to sedation or analgesia regimen? No   D: Delirium CAM-ICU: Negative   E: Early Mobility  Plan for early mobility? Yes   F: Family Engagement Plan for family engagement today? Yes         Prophylaxis:  VTE VTE covered by:  [Transfer Hold] heparin (porcine), Subcutaneous       Stress Ulcer  covered bypantoprazole (PROTONIX) 40 mg tablet [[id number]] (Long-Term Med), pantoprazole (PROTONIX) EC tablet 40 mg [[id number]]        Significant 24hr Events     24hr events: Pt. Remained with stable neuro exams overnight. No acute issues. Pt. Denies any discomfort or pain.      Subjective     Review of Systems   Constitutional: Negative.    HENT: Negative.     Eyes: Negative.    Respiratory: Negative.     Cardiovascular: Negative.    Gastrointestinal: Negative.    Endocrine: Negative.    Genitourinary: Negative.    Musculoskeletal: Negative.    Allergic/Immunologic: Negative.    Neurological:  Positive for tremors.        Forgetful   Hematological: Negative.    Psychiatric/Behavioral: Negative.          Objective                            Vitals I/O      Most Recent Min/Max in 24hrs   Temp 98.6 °F (37 °C) Temp  Min: 98.1 °F (36.7 °C)  Max: 98.8 °F (37.1 °C)   Pulse (!) 50 Pulse  Min: 50  Max: 86   Resp (!) 8 Resp  Min: 8  Max: 20   BP (!) 97/47 BP  Min: 82/47  Max: 146/83   O2 Sat 99 % SpO2  Min: 96 %  Max: 100 %      Intake/Output Summary (Last 24 hours) at 5/21/2024 0525  Last data filed at 5/20/2024 1327  Gross per 24 hour   Intake 845 ml   Output --   Net 845 ml       Diet Regular; Regular House    Invasive Monitoring           Physical Exam    Physical Exam  Vitals and nursing note reviewed.   Eyes:      Extraocular Movements: Extraocular movements intact.      Conjunctiva/sclera: Conjunctivae normal.      Pupils: Pupils are equal, round, and reactive to light.   Skin:     General: Skin is warm and dry.      Capillary Refill: Capillary refill takes less than 2 seconds.   HENT:      Head: Normocephalic and atraumatic.      Right Ear: Hearing normal.      Left Ear: Hearing normal.      Mouth/Throat:      Mouth: Mucous membranes are dry.   Cardiovascular:      Rate and Rhythm: Normal rate and regular rhythm.      Pulses: Normal pulses.      Heart sounds: Normal heart sounds.   Musculoskeletal:         General: Normal range of motion.      Comments: Mild tremor noted   Abdominal: General: Bowel sounds are normal.      Palpations: Abdomen is soft.   Constitutional:       Appearance: He is well-developed and well-nourished.   Pulmonary:      Effort: Pulmonary effort is normal.      Breath sounds: Normal breath sounds.   Neurological:      Mental Status: He is alert. Mental status is at baseline.        Corneal reflex present, cough reflex and gag reflex intact.      Comments: Oriented X self, able to re-orient to place and time            Diagnostic Studies      EKG: Tele reviewed  Imaging:  I have personally reviewed pertinent reports.       Medications:  Scheduled PRN   [Transfer Hold] aspirin, 81 mg, Daily  [Transfer Hold] atorvastatin, 40 mg, Daily With Breakfast  [Transfer Hold] chlorhexidine, 15 mL, Q12H JOSE  [Transfer Hold] cilostazol, 100 mg, BID AC  [Transfer Hold] donepezil, 10 mg, Daily  [Transfer Hold] heparin (porcine), 5,000 Units, Q8H JOSE  [Transfer Hold] hydrALAZINE, 25 mg, Q8H JOSE  [Transfer Hold] levETIRAcetam, 500 mg, BID  [Transfer Hold] metoprolol succinate, 100 mg, Daily  [Transfer Hold] pantoprazole, 40 mg, Early Morning  [Transfer Hold] polyethylene glycol, 17 g, Daily  [Transfer Hold] tamsulosin, 0.4 mg, Daily With Dinner  [Transfer  Hold] ticagrelor, 90 mg, Q12H JOSE  [Transfer Hold] traZODone, 100 mg, HS      [Transfer Hold] albuterol, 2 puff, Q6H PRN  [Transfer Hold] baclofen, 5 mg, BID PRN  labetalol, 5 mg, Q15 Min PRN   And  hydrALAZINE, 15 mg, Q15 Min PRN   And  niCARdipine, 1-15 mg/hr, Continuous PRN  phenylephine,  mcg/min, Continuous PRN       Continuous    multi-electrolyte, 75 mL/hr  niCARdipine, 1-15 mg/hr  phenylephine,  mcg/min         Labs:    CBC    Recent Labs     05/20/24  0730 05/20/24  1017   WBC 8.41  --    HGB 14.3  --    HCT 43.3  --     226     BMP    Recent Labs     05/20/24  0730   SODIUM 138   K 3.7      CO2 28   AGAP 7   BUN 28*   CREATININE 1.10   CALCIUM 9.6       Coags    Recent Labs     05/20/24  0730   INR 1.03   PTT 28        Additional Electrolytes  No recent results       Blood Gas    No recent results  No recent results LFTs  Recent Labs     05/20/24  0730   ALT 15   AST 14   ALKPHOS 92   ALB 4.5   TBILI 0.45       Infectious  No recent results  Glucose  Recent Labs     05/20/24  0730   GLUC 92               Ng Adrienne, CRNP

## 2024-05-21 NOTE — RESTORATIVE TECHNICIAN NOTE
Restorative Technician Note      Patient Name: Constanza Zhu     Note Type: Mobility  Patient Position Upon Consult: Supine  Activity Performed: Ambulated; Dangled; Stood  Assistive Device: Roller walker  Education Provided: Yes  Patient Position at End of Consult: Supine; Bed/Chair alarm activated; All needs within reach    Farhad REYNA, Restorative Technician, [facility]

## 2024-05-21 NOTE — UTILIZATION REVIEW
NOTIFICATION OF INPATIENT ADMISSION   AUTHORIZATION REQUEST   SERVICING FACILITY:   [facility]  Address: [address] [place] [address]  Tax ID: [id number]  NPI: [id number] ATTENDING PROVIDER:  Attending Name and NPI#: Glenn Mattson Md [[id number]]  Address: [address] [place] [address]  Phone: [phone]   ADMISSION INFORMATION:  Place of Service: Inpatient [facility]  Place of Service Code: 21  Inpatient Admission Date/Time: 5/20/24  9:59 AM  Discharge Date/Time: No discharge date for patient encounter.  Admitting Diagnosis Code/Description:  Left carotid stenosis [I65.22]  Bilateral carotid artery stenosis [I65.23]     UTILIZATION REVIEW CONTACT:  Cristino Morales Utilization   Network Utilization Review Department  Phone: [phone]  Fax: [phone]  Email: [email or web address]  Contact for approvals/pending authorizations, clinical reviews, and discharge.     PHYSICIAN ADVISORY SERVICES:  Medical Necessity Denial & Apxr-df-Coqu Review  Phone: [phone]  Fax: [phone]  Email: [email or web address]     DISCHARGE SUPPORT TEAM:  For Patients Discharge Needs & Updates  Phone: [phone] opt. 2 Fax: [phone]  Email: [email or web address]

## 2024-05-21 NOTE — PLAN OF CARE
Problem: Neurological Deficit  Goal: Neurological status is stable or improving  Description: Interventions:  - Monitor and assess patient's level of consciousness, motor function, sensory function, and level of assistance needed for ADLs.   - Monitor and report changes from baseline. Collaborate with interdisciplinary team to initiate plan and implement interventions as ordered.   - Provide and maintain a safe environment.  - Consider seizure precautions.  - Consider fall precautions.  - Consider aspiration precautions.  - Consider bleeding precautions.  Outcome: Progressing     Problem: Activity Intolerance/Impaired Mobility  Goal: Mobility/activity is maintained at optimum level for patient  Description: Interventions:  - Assess and monitor patient  barriers to mobility and need for assistive/adaptive devices.  - Assess patient's emotional response to limitations.  - Collaborate with interdisciplinary team and initiate plans and interventions as ordered.  - Encourage independent activity per ability.  - Maintain proper body alignment.  - Perform active/passive rom as tolerated/ordered.  - Plan activities to conserve energy.  - Turn patient as appropriate  Outcome: Progressing     Problem: Communication Impairment  Goal: Ability to express needs and understand communication  Description: Assess patient's communication skills and ability to understand information.  Patient will demonstrate use of effective communication techniques, alternative methods of communication and understanding even if not able to speak.     - Encourage communication and provide alternate methods of communication as needed.  - Collaborate with case management/ for discharge needs.  - Include patient/family/caregiver in decisions related to communication.  Outcome: Progressing     Problem: Potential for Aspiration  Goal: Non-ventilated patient's risk of aspiration is minimized  Description: Assess and monitor vital signs,  respiratory status, and labs (WBC).  Monitor for signs of aspiration (tachypnea, cough, rales, wheezing, cyanosis, fever).    - Assess and monitor patient's ability to swallow.  - Place patient up in chair to eat if possible.  - HOB up at 90 degrees to eat if unable to get patient up into chair.  - Supervise patient during oral intake.   - Instruct patient/ family to take small bites.  - Instruct patient/ family to take small single sips when taking liquids.  - Follow patient-specific strategies generated by speech pathologist.  Outcome: Progressing     Problem: Nutrition  Goal: Nutrition/Hydration status is improving  Description: Monitor and assess patient's nutrition/hydration status for malnutrition (ex- brittle hair, bruises, dry skin, pale skin and conjunctiva, muscle wasting, smooth red tongue, and disorientation). Collaborate with interdisciplinary team and initiate plan and interventions as ordered.  Monitor patient's weight and dietary intake as ordered or per policy. Utilize nutrition screening tool and intervene per policy. Determine patient's food preferences and provide high-protein, high-caloric foods as appropriate.     - Assist patient with eating.  - Allow adequate time for meals.  - Encourage patient to take dietary supplement as ordered.  - Collaborate with clinical nutritionist.  - Include patient/family/caregiver in decisions related to nutrition.  Outcome: Progressing     Problem: PAIN - ADULT  Goal: Verbalizes/displays adequate comfort level or baseline comfort level  Description: Interventions:  - Encourage patient to monitor pain and request assistance  - Assess pain using appropriate pain scale  - Administer analgesics based on type and severity of pain and evaluate response  - Implement non-pharmacological measures as appropriate and evaluate response  - Consider cultural and social influences on pain and pain management  - Notify physician/advanced practitioner if interventions  unsuccessful or patient reports new pain  Outcome: Progressing     Problem: INFECTION - ADULT  Goal: Absence or prevention of progression during hospitalization  Description: INTERVENTIONS:  - Assess and monitor for signs and symptoms of infection  - Monitor lab/diagnostic results  - Monitor all insertion sites, i.e. indwelling lines, tubes, and drains  - Monitor endotracheal if appropriate and nasal secretions for changes in amount and color  - Forbes appropriate cooling/warming therapies per order  - Administer medications as ordered  - Instruct and encourage patient and family to use good hand hygiene technique  - Identify and instruct in appropriate isolation precautions for identified infection/condition  Outcome: Progressing     Problem: SAFETY ADULT  Goal: Patient will remain free of falls  Description: INTERVENTIONS:  - Educate patient/family on patient safety including physical limitations  - Instruct patient to call for assistance with activity   - Consult OT/PT to assist with strengthening/mobility   - Keep Call bell within reach  - Keep bed low and locked with side rails adjusted as appropriate  - Keep care items and personal belongings within reach  - Initiate and maintain comfort rounds  - Make Fall Risk Sign visible to staff  - Offer Toileting every 2 Hours, in advance of need  - Initiate/Maintain bedalarm  - Obtain necessary fall risk management equipment: non skid footwear  - Apply yellow socks and bracelet for high fall risk patients  - Consider moving patient to room near nurses station  Outcome: Progressing  Goal: Maintain or return to baseline ADL function  Description: INTERVENTIONS:  -  Assess patient's ability to carry out ADLs; assess patient's baseline for ADL function and identify physical deficits which impact ability to perform ADLs (bathing, care of mouth/teeth, toileting, grooming, dressing, etc.)  - Assess/evaluate cause of self-care deficits   - Assess range of motion  - Assess  patient's mobility; develop plan if impaired  - Assess patient's need for assistive devices and provide as appropriate  - Encourage maximum independence but intervene and supervise when necessary  - Involve family in performance of ADLs  - Assess for home care needs following discharge   - Consider OT consult to assist with ADL evaluation and planning for discharge  - Provide patient education as appropriate  Outcome: Progressing  Goal: Maintains/Returns to pre admission functional level  Description: INTERVENTIONS:  - Perform AM-PAC 6 Click Basic Mobility/ Daily Activity assessment daily.  - Set and communicate daily mobility goal to care team and patient/family/caregiver.   - Collaborate with rehabilitation services on mobility goals if consulted  - Perform Range of Motion 3 times a day.  - Reposition patient every 2 hours.  - Dangle patient 2 times a day  - Stand patient 3 times a day  - Ambulate patient 3 times a day  - Out of bed to chair 3 times a day   - Out of bed for meals 3 times a day  - Out of bed for toileting  - Record patient progress and toleration of activity level   Outcome: Progressing     Problem: DISCHARGE PLANNING  Goal: Discharge to home or other facility with appropriate resources  Description: INTERVENTIONS:  - Identify barriers to discharge w/patient and caregiver  - Arrange for needed discharge resources and transportation as appropriate  - Identify discharge learning needs (meds, wound care, etc.)  - Arrange for interpretive services to assist at discharge as needed  - Refer to Case Management Department for coordinating discharge planning if the patient needs post-hospital services based on physician/advanced practitioner order or complex needs related to functional status, cognitive ability, or social support system  Outcome: Progressing     Problem: Knowledge Deficit  Goal: Patient/family/caregiver demonstrates understanding of disease process, treatment plan, medications, and  discharge instructions  Description: Complete learning assessment and assess knowledge base.  Interventions:  - Provide teaching at level of understanding  - Provide teaching via preferred learning methods  Outcome: Progressing     Problem: Prexisting or High Potential for Compromised Skin Integrity  Goal: Skin integrity is maintained or improved  Description: INTERVENTIONS:  - Identify patients at risk for skin breakdown  - Assess and monitor skin integrity  - Assess and monitor nutrition and hydration status  - Monitor labs   - Assess for incontinence   - Turn and reposition patient  - Assist with mobility/ambulation  - Relieve pressure over bony prominences  - Avoid friction and shearing  - Provide appropriate hygiene as needed including keeping skin clean and dry  - Evaluate need for skin moisturizer/barrier cream  - Collaborate with interdisciplinary team   - Patient/family teaching  - Consider wound care consult   Outcome: Progressing

## 2024-05-21 NOTE — DISCHARGE SUMMARY
[facility]  Discharge- Constanza Zhu 1960, 64 y.o. male MRN: [id number]  [facility] Encounter: [id number]  Primary Care Provider: ERIC Calixto   Date and time admitted to hospital: 5/20/2024  9:59 AM    Carotid stenosis, right  Assessment & Plan  PPD 1 right carotid stent placement  History of right MCA stroke and thrombectomy in 2019 then presented in May 2023 with new left MCA stroke and significant bilateral carotid stenosis.   History of left ICA stenting on 5/4/2023 by Dr. Mattson.  He was started on aspirin and Brilinta at that time.  Also noted to have asymptomatic right carotid stenosis.  Since then patient had another small stroke while off triple therapy, he has since been back on triple therapy.  Earlier this month in the office patient noted to have progressive right carotid stenosis.    No new imaging to review     Plan:  Continue frequent neurological checks  STAT CT/CTA head and neck with any neurological decline including drop in GCS of 2 points within 1 hour  Recommend SBP<160 and MAPs >65  Continue aspirin, Brilinta and Pletal as ordered  Pain well-controlled on current regimen  Mobilize patient as tolerated  DVT PPX: SCDs and heparin SQ    Neurosurgery will continue to follow as primary, patient is medically stable for discharge home today.  He will follow-up in 2 weeks with carotid Dopplers, call with any further questions or concerns.    Internal carotid artery stent present  Assessment & Plan  S/p L carotid stenting 5/4/23 by   See above plan    Possible NPH (normal pressure hydrocephalus) (HCC)  Assessment & Plan  In the outpatient office, patient noted to have intermittent episodes of urinary incontinence.  But his gait and memory was not changed.  He does have large ventricles on imaging but given his vascular risk factors Dr. Mattson was hesitant to stop antiplatelet therapy for lumbar puncture.  Will continue to  "monitor patient clinically.    Chronic ischemic left MCA stroke  Assessment & Plan  Prior R MCA thrombectomy in 2019  Presented in May 2023 with new left MCA stroke and significant bilateral carotid stenosis.  See above plan            Medical Problems       Resolved Problems  Date Reviewed: 5/21/2024   None       Subjective/objective    Chief complaint:\"Hi\"    Subjective: Patient reports ongoing blurry vision which is unchanged.  Otherwise he offers no complaints.    Objective: Patient comfortably laying in bed, NAD.      General appearance: alert, appears stated age, cooperative and no distress  Head: Normocephalic, without obvious abnormality, atraumatic  Eyes: EOMI, PERRL, conjugate gaze  Neck: supple, symmetrical, trachea midline   Lungs: non labored breathing  Heart: regular heart rate  Neurologic:   Mental status: Alert, oriented x3 when choices given, following commands, stuttered speech noted  Cranial nerves: grossly intact (Cranial nerves II-XII)  Sensory: normal to light touch in all extremities x 4  Motor: moving all extremities, strength grossly 5/5 throughout  Reflexes: 2+ and symmetric, no hoffmans or clonus appreciated  Coordination: finger to nose normal bilaterally, no drift bilaterally   Right groin site: Pressure dressing removed.  Site remains clean, dry, intact no erythema or hematoma noted      Discharge Date: 5/21/24    Admitting Diagnosis: Left carotid stenosis [I65.22]  Bilateral carotid artery stenosis [I65.23]  Right carotid stenosis  Prior right MCA stroke and thrombectomy  Prior left MCA stroke  Status post left ICA stenting    Discharge Diagnosis: Left carotid stenosis  Bilateral carotid artery stenosis  Right carotid stenosis  Prior right MCA stroke and thrombectomy  Prior left MCA stroke  Status post right carotid stent placement    Attending: Dr. Mattson    Consulting Physician(s): Dr. Paulson    Procedures Performed: Status post right carotid stent placement    Radiology:  US " thyroid    Result Date: 5/20/2024  Narrative: THYROID ULTRASOUND INDICATION: 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 COMPARISON: Thyroid ultrasound 6/1/2023 TECHNIQUE: Ultrasound of the thyroid was performed with a high frequency linear transducer in transverse and sagittal planes including volumetric imaging sweeps as well as traditional still imaging technique. FINDINGS: Thyroid texture: Normal homogeneous smooth echotexture. Right lobe: 4.0 x 2.0 x 1.6 cm. Volume 6.1 mL Left lobe: 3.8 x 2.1 x 1.8 cm. Volume 7.0 mL Isthmus: 0.2 cm. Nodule #1. Image 11. RIGHT upper pole nodule measuring 1.4 x 0.8 x 1.3 cm. COMPOSITION: 2 points, solid or almost completely solid. ECHOGENICITY: 2 points, hypoechoic. SHAPE: 0 points, wider-than-tall. MARGIN: 0 points, smooth. ECHOGENIC FOCI: 0 points, none or large comet-tail artifacts. TI-RADS Classification: TR 4 (4-6 points), FNA if > 1.5 cm. Follow if > 1 cm. This nodule has had a previous benign biopsy. As it is stable, no further sampling recommended at this time. Further surveillance at 2 year intervals could be considered to confirm continued stability for a period of greater than 5 years. There are additional nodules of lesser size and/or TI-RADS score, mostly reflecting colloid cysts. These do not necessitate additional evaluation based on ACR criteria.     Impression: No nodule meets current ACR criteria for requiring biopsy but follow-up ultrasound is recommended in 2 years. Reference: ACR Thyroid Imaging, Reporting and Data System (TI-RADS): White Paper of the ACR TI-RADS Committee. J AM Aram Radiol 2017;14:587-595. Additional recommendations based on American Thyroid Association 2015 guidelines. Workstation performed: [id number]       Hospital Course: Constanza Zhu is a 65 y/o male with history of right MCA occlusion and right carotid stenosis when he presented to the hospital as a stroke alert in March 2019.  Patient had worsening left-sided weakness and ultimately  underwent right MCA thrombectomy on 3/19/2019 by Dr. Katz.  He was started on aspirin and Plavix at that time.  Patient was doing fine until he represented as a stroke alert in May 2023.  He presented left MCA syndrome with altered mental status, facial droop and weakness.  Patient found to have bilateral carotid stenosis.  Ultimately undergoing a left carotid artery stenting on 5/4/2023 by Dr. Mattson.  Patient was started on aspirin and Brilinta at that time.  Per hospital note from 5/5/2023 patient was oriented to person and place and had difficulty when orientation questions answered but was able to answer with multiple-choices.  Per chart review he was seen by Dr. Mattson in June 2023 and had significant clinical improvement but still had some expressive aphasia and a visual cut.  Doppler showed patent stent.  He continued to have asymptomatic right carotid stenosis who is on dual antiplatelet.  Patient was seen in December 2023 when the most recent CT demonstrated some in-stent stenosis, there was concern for TIA/some ischemic changes after episode of COVID in March while he was off triple therapy.  He remains on triple therapy.There also was some concern for normal pressure hydrocephalus clinical follow-up was recommended.  He most recently saw Dr. Mattson on 5/1/2024 with progressive stenosis of right ICA and they discussed intervention.  Patient underwent elective right carotid stent placement under general anesthesia with minimal blood loss and no complications.  Patient was kept in the PACU until stable and then moved to the floor.  Patient was cleared medically for discharge home today 5/21/2024.  Prior to discharge, postoperative instructions were discussed with patient.  During that time, all questions and concerns were addressed.  Patient will follow up outpatient in 2 weeks with carotid Dopplers.  Continue aspirin, Pletal and Brilinta as ordered.    Condition at Discharge: good     Discharge  instructions/Information to patient and family:   See after visit summary for information provided to patient and family.      Provisions for Follow-Up Care:  See after visit summary for information related to follow-up care and any pertinent home health orders.      Disposition: Home        Planned Readmission: No    Discharge Statement   I spent 26 minutes discharging the patient. This time was spent on the day of discharge. I had direct contact with the patient on the day of discharge. Additional documentation is required if more than 30 minutes were spent on discharge.     Discharge Medications:  See after visit summary for reconciled discharge medications provided to patient and family.

## 2024-05-21 NOTE — TELEPHONE ENCOUNTER
5/23/24 - PT DISCHARGED TO HOME  6/3/24 2 WK HFU W/CAROTID DOPPLERS W/EM    5/21/34 - PT IN [facility]  6/3/24 2 WK HFU W/CAROTID DOPPLERS W/EM    [facility]  Patient already has appointment with Dr. Mattson in 2 weeks but he needs carotid Dopplers prior to appointment please call and schedule

## 2024-05-21 NOTE — ASSESSMENT & PLAN NOTE
In the outpatient office, patient noted to have intermittent episodes of urinary incontinence.  But his gait and memory was not changed.  He does have large ventricles on imaging but given his vascular risk factors Dr. Mattson was hesitant to stop antiplatelet therapy for lumbar puncture.  Will continue to monitor patient clinically.

## 2024-05-22 ENCOUNTER — TELEPHONE (OUTPATIENT)
Dept: NEUROSURGERY | Facility: CLINIC | Age: 64
End: 2024-05-22

## 2024-05-22 NOTE — TELEPHONE ENCOUNTER
Completed post angio/intervention callback to Constanza Zhu at primary contact number spoke with his wife Faiza. She states Constanza is doing relatively well overall and denies any pain, swelling, drainage, fevers at the puncture site. Confirmed understanding of post procedure medications, continue on ASA 81 mg daily, Brilinta 90 mg BID and Pletal 100mg BID until advised otherwise. Reports no headaches at this time. Explained that she should contact the office if he experiences any pain, swelling, or drainage from the site, and report to the ER or call 911 if he experiences WHOL, visual disturbance, confusion/disorientation, slurred speech, or ambulatory dysfunction. Reminded of post procedure follow-up scheduled on 6/3/2024 with carotid doppler (5/31/2024). Patient appreciative of call.

## 2024-05-22 NOTE — UTILIZATION REVIEW
NOTIFICATION OF ADMISSION DISCHARGE   This is a Notification of Discharge from [facility]. Please be advised that this patient has been discharge from our facility. Below you will find the admission and discharge date and time including the patient’s disposition.   UTILIZATION REVIEW CONTACT:  Cristino Morales  Utilization   Network Utilization Review Department  Phone: [phone] x carefully listen to the prompts. All voicemails are confidential.  Email: [email or web address]     ADMISSION INFORMATION  PRESENTATION DATE: 5/20/2024  9:59 AM  OBERVATION ADMISSION DATE:   INPATIENT ADMISSION DATE: 5/20/24  9:59 AM   DISCHARGE DATE: 5/21/2024  4:57 PM   DISPOSITION:Home/Self Care    Network Utilization Review Department  ATTENTION: Please call with any questions or concerns to [phone] and carefully listen to the prompts so that you are directed to the right person. All voicemails are confidential.   For Discharge needs, contact Care Management DC Support Team at [phone] opt. 2  Send all requests for admission clinical reviews, approved or denied determinations and any other requests to dedicated fax number below belonging to the campus where the patient is receiving treatment. List of dedicated fax numbers for the Facilities:  FACILITY NAME UR FAX NUMBER   ADMISSION DENIALS (Administrative/Medical Necessity) [phone]   DISCHARGE SUPPORT TEAM ([facility]) [phone]   PARENT CHILD HEALTH (Maternity/NICU/Pediatrics) [phone]   [facility] [phone]   [facility] [phone]   [facility], [facility] [phone]   [facility] [phone]   [facility] [phone]   [facility] [phone]   General acute hospital [phone]   [facility] [phone]   [facility]  [facility] [phone]   [facility] [phone]   [facility] [phone]   [facility] [phone]

## 2024-05-23 NOTE — TELEPHONE ENCOUNTER
05/23/2024- PT DISCHARGED HOME. CALLED AND SPOKE TO PT'S WIFE TO CONFIRM THE 06/03/2024 APT W/ DOPPLER NEEDED PRIOR. PT'S WIFE ASKED IF WE COULD MOVE IT DOWN TO LATER IN THE DAY. APT WAS MOVED DOWN TO 3:30 AND THE DOPPLER WAS SCHEDULED FOR 05/31/2024.

## 2024-05-31 ENCOUNTER — HOSPITAL ENCOUNTER (OUTPATIENT)
Dept: NON INVASIVE DIAGNOSTICS | Facility: HOSPITAL | Age: 64
Discharge: HOME | End: 2024-05-31
Payer: COMMERCIAL

## 2024-05-31 DIAGNOSIS — I65.23 BILATERAL CAROTID ARTERY STENOSIS: ICD-10-CM

## 2024-05-31 PROCEDURE — 93880 EXTRACRANIAL BILAT STUDY: CPT

## 2024-06-01 PROCEDURE — 93880 EXTRACRANIAL BILAT STUDY: CPT | Performed by: SURGERY

## 2024-06-03 ENCOUNTER — OFFICE VISIT (OUTPATIENT)
Dept: NEUROSURGERY | Facility: CLINIC | Age: 64
End: 2024-06-03

## 2024-06-03 ENCOUNTER — TELEPHONE (OUTPATIENT)
Dept: NEUROSURGERY | Facility: CLINIC | Age: 64
End: 2024-06-03

## 2024-06-03 VITALS
TEMPERATURE: 99.3 F | WEIGHT: 195 LBS | DIASTOLIC BLOOD PRESSURE: 82 MMHG | OXYGEN SATURATION: 97 % | SYSTOLIC BLOOD PRESSURE: 136 MMHG | BODY MASS INDEX: 31.34 KG/M2 | RESPIRATION RATE: 17 BRPM | HEART RATE: 72 BPM | HEIGHT: 66 IN

## 2024-06-03 DIAGNOSIS — I63.9 RECURRENT STROKES (HCC): Primary | ICD-10-CM

## 2024-06-03 DIAGNOSIS — G40.109 FOCAL EPILEPSY (HCC): ICD-10-CM

## 2024-06-03 DIAGNOSIS — I65.22 LEFT CAROTID STENOSIS: ICD-10-CM

## 2024-06-03 DIAGNOSIS — Z86.73 CHRONIC ISCHEMIC LEFT MCA STROKE: ICD-10-CM

## 2024-06-03 DIAGNOSIS — I65.21 CAROTID STENOSIS, RIGHT: ICD-10-CM

## 2024-06-03 PROCEDURE — 99024 POSTOP FOLLOW-UP VISIT: CPT | Performed by: NEUROLOGICAL SURGERY

## 2024-06-03 RX ORDER — LEVETIRACETAM 500 MG/1
1000 TABLET ORAL 2 TIMES DAILY
Qty: 360 TABLET | Refills: 3 | OUTPATIENT
Start: 2024-06-03

## 2024-06-03 RX ORDER — HYDROCHLOROTHIAZIDE 12.5 MG/1
12.5 CAPSULE, GELATIN COATED ORAL EVERY MORNING
COMMUNITY

## 2024-06-03 RX ORDER — LEVETIRACETAM 500 MG/1
TABLET ORAL
Qty: 360 TABLET | Refills: 3 | Status: SHIPPED | OUTPATIENT
Start: 2024-06-03

## 2024-06-03 NOTE — TELEPHONE ENCOUNTER
Patient was met at checkout after appt today. Patient's spouse expressed concerns regarding the keppra script stating patient is currently taking the medication 1000 mg BID. She stated if he were to decrease to 500 mg BID for a week as noted on the script he will have a seizure. She is requesting the script be changed to 1000 mg BID.     This RN adjusted the script and will route to Dr Mattson for the final sign off. Patient and spouse were appreciative.    Non Face-to-Face

## 2024-06-03 NOTE — PROGRESS NOTES
Patient Id: Constanza Zhu is a 64 y.o. male        Handedness: Right    Assessment/Plan:    Diagnoses and all orders for this visit:    Recurrent strokes (HCC)  -     VAS carotid complete study; Future    Focal epilepsy (HCC)  -     levETIRAcetam (Keppra) 500 mg tablet; Take 1 tab (500 mg) twice a day for 1 week, then take 2 tabs (1000 mg) twice a day.    Left carotid stenosis  -     VAS carotid complete study; Future    Chronic ischemic left MCA stroke  -     VAS carotid complete study; Future    Carotid stenosis, right  -     VAS carotid complete study; Future    Other orders  -     hydroCHLOROthiazide 12.5 mg capsule; Take 12.5 mg by mouth every morning        Discussion and summary:   1.  Symptomatic left carotid stenosis status post angioplasty and stenting 5/4/2023  No significant stenosis after his most recent arteriogram.  He remains on triple therapy.     2.  Asymptomatic right carotid stenosis, status post DARRELL 5/1/2024   2 weeks status post stenting.  He is doing well.  We will continue triple therapy until his wife returns back from her business trip and then transition him to Brilinta aspirin dual antiplatelet therapy for a total of 3 months.  At that time we will obtain a Doppler prior to transitioning him to antiplatelet monotherapy.  We will likely consider keeping him on Brilinta monotherapy given his intracranial atherosclerotic disease    3.  Prior history of right MCA thrombectomy with evidence of intracranial stenosis   See above.     4.  Concern for normal pressure hydrocephalus.  Will cont to monitor.  His wife states his memory and ambulation have significantly improved after stenting.     5.  Seizures.  He remains on 1000 mg twice daily of Keppra.    I spent 30 minutes in the care of this patient including the review and interpretation of imaging and tests results, as well as communication/explanation to the patient  regarding the natural history of this process and test/imaging results,  and documentation.      Chief Complaint: Post-op (2 week f/u Cerebral Angio W/CAROTID DOPPLERS)        HPI:   This is an unfortunate 64-year-old gentleman who initially presented in 2019 requiring a right MCA thrombectomy and had significant improvement.  Unfortunately he represented with a new left MCA stroke and significant bilateral carotid stenosis.  He underwent a left carotid angioplasty and stenting which was without complication.  He was ultimately discharged from the hospital to rehab.      Since his last visit he did have another small stroke while off triple therapy.  This was in proximity to his COVID-vaccine.  He is now back on triple therapy and has had no further neurologic symptoms.  Prior to his recrudescent stroke he was largely independent and walking quite well.  Most recently he has had some fatigue but still walks at home without a walker.  His modified Aurea score is approximately 3.      Ultimately he underwent carotid stenting of his right carotid on May 2024.  He did have some underlying right MCA stenosis likely associated with his prior thrombectomy.  Otherwise since his discharge he has been doing well.  He remains on aspirin, Brilinta, and Pletal.  He has not had any bleeding complications.  His wife notes that his memory has significantly improved as well as his level of activity and endurance.     He is allergic to lisinopril.     He is .  He has 1 son that is 40 and 1 daughter that is 39.  He is currently disabled. He has not smoked since onset of hospitalization.  He does not drink alcohol or use illicit drugs.  He has 3 maternal uncles that had strokes. His father also suffered a stroke.  His father was adopted so extended family history is unknown.    Review of systems obtained by the MA reviewed and updated below.    Review of Systems   Constitutional: Negative.    HENT: Negative.  Negative for tinnitus.    Eyes:  Positive for visual disturbance (occa floaters, and some  blurriness not new).   Respiratory: Negative.     Cardiovascular: Negative.    Gastrointestinal: Negative.    Endocrine: Negative.    Genitourinary: Negative.    Musculoskeletal:  Positive for gait problem (uses walker).   Skin: Negative.    Allergic/Immunologic: Negative.    Neurological:  Positive for speech difficulty (some difficulties with stuttering, has improved). Negative for dizziness, weakness, light-headedness, numbness and headaches.   Hematological:  Bruises/bleeds easily (medication).   Psychiatric/Behavioral: Negative.  Negative for confusion, decreased concentration and sleep disturbance.        Physical Exam  Vitals:    06/03/24 1605   BP: 136/82   Pulse: 72   Resp: 17   Temp: 99.3 °F (37.4 °C)   SpO2: 97%   He is awake and alert.  His pupils are equal round reactive to light.  His extraocular movements are intact.  He has some diminished coordination however he has full strength in his bilateral upper extremities and symmetric lower extremity strength.  He is in a wheelchair for long distances, however at home he walks either with a walker independently.    The following portions of the patient's history were reviewed and updated as appropriate: allergies, current medications, past family history, past medical history, past social history, past surgical history, and problem list.    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) 04/26/2023    Hyponatremia 04/26/2023    Prediabetes 04/27/2023    SIRS (systemic inflammatory response syndrome) ([facility]) 04/27/2023    Tachycardia 05/05/2023    Infrarenal abdominal aortic aneurysm (AAA) without rupture ([facility]) 05/08/2023    History of tobacco use 05/12/2023    Chronic ischemic left MCA stroke 05/12/2023    Abnormal laboratory test 05/15/2023    Multiple thyroid nodules 06/01/2023    Possible NPH (normal pressure hydrocephalus) ([facility]) 06/02/2023    Muscle spasms of both lower extremities 06/02/2023    Aphasia 06/06/2023    Atherosclerosis of native arteries of extremities with intermittent claudication, bilateral legs ([facility]) 06/06/2023    Benign prostatic hyperplasia with lower urinary tract symptoms 06/06/2023    Type 2 diabetes mellitus with other diabetic ophthalmic complication ([facility]) 07/20/2023    Claudication of both lower extremities ([facility]) 08/18/2023    Focal epilepsy ([facility]) 08/31/2023    Internal carotid artery stent present 01/23/2024    Vision problem 01/23/2024    Dry eyes 01/23/2024    Acute CVA (cerebrovascular accident) ([facility]) 02/08/2024    COVID 02/08/2024    Internal carotid artery stenosis, left 04/16/2024    Acute stroke due to ischemia ([facility]) 04/23/2024    PVC (premature ventricular contraction) 05/02/2024    Carotid stenosis, right 05/21/2024     Resolved Ambulatory Problems     Diagnosis Date Noted    Hyperglycemia 03/19/2019    Dyslipidemia 03/26/2019    Arthropathy of left shoulder 03/28/2019    Chronic ischemic right MCA stroke 08/06/2019    Vascular dementia ([facility]) 03/02/2021    Leukocytosis 05/12/2023    Hypokalemia  05/12/2023    Sepsis due to COVID-19 (HCC) 10/03/2023    AMS (altered mental status) 02/08/2024     Past Medical History:   Diagnosis Date    Depression     Diabetes mellitus (HCC)     Hyperlipidemia     Hypertension     Stroke (HCC)        Past Surgical History:   Procedure Laterality Date    ARTHROSCOPIC REPAIR ACL Left     BACK SURGERY      twice    CARPAL TUNNEL RELEASE Right     IR CEREBRAL ANGIOGRAPHY  05/04/2023    IR STROKE ALERT  03/19/2019    SHOULDER SURGERY Left     US GUIDED THYROID BIOPSY  11/21/2023         Current Outpatient Medications:     acetaminophen (TYLENOL) 500 mg tablet, Take 500 mg by mouth as needed for mild pain, Disp: , Rfl:     albuterol (PROVENTIL HFA,VENTOLIN HFA) 90 mcg/act inhaler, , Disp: , Rfl:     aspirin (ECOTRIN LOW STRENGTH) 81 mg EC tablet, Take 1 tablet (81 mg total) by mouth daily Do not start before April 19, 2023., Disp: 30 tablet, Rfl: 0    atorvastatin (LIPITOR) 40 mg tablet, Take 40 mg by mouth daily with breakfast, Disp: , Rfl:     baclofen 5 MG TABS, Take 5 mg by mouth 2 (two) times a day as needed for muscle spasms, Disp: , Rfl: 0    cilostazol (PLETAL) 100 mg tablet, Take 1 tablet (100 mg total) by mouth 2 (two) times a day before meals, Disp: 60 tablet, Rfl: 0    donepezil (ARICEPT) 10 mg tablet, Take 1 tablet (10 mg total) by mouth in the morning, Disp: 60 tablet, Rfl: 2    hydroCHLOROthiazide 12.5 mg capsule, Take 12.5 mg by mouth every morning, Disp: , Rfl:     levETIRAcetam (Keppra) 500 mg tablet, Take 1 tab (500 mg) twice a day for 1 week, then take 2 tabs (1000 mg) twice a day., Disp: 360 tablet, Rfl: 3    losartan (COZAAR) 50 mg tablet, Take 50 mg by mouth daily, Disp: , Rfl:     metoprolol succinate (TOPROL-XL) 100 mg 24 hr tablet, Take 100 mg by mouth daily, Disp: , Rfl:     pantoprazole (PROTONIX) 40 mg tablet, Take 1 tablet (40 mg total) by mouth daily, Disp: 90 tablet, Rfl: 0    tamsulosin (FLOMAX) 0.4 mg, Take 1 capsule (0.4 mg total) by mouth daily  with dinner, Disp: 90 capsule, Rfl: 3    ticagrelor (BRILINTA) 90 MG, Take 1 tablet (90 mg total) by mouth every 12 (twelve) hours, Disp: 60 tablet, Rfl: 3    traZODone (DESYREL) 100 mg tablet, Take 100 mg by mouth daily at bedtime, Disp: , Rfl:     hydrALAZINE (APRESOLINE) 25 mg tablet, Only to be taken if diastolic bp 170 or over (Patient not taking: Reported on 6/3/2024), Disp: , Rfl:     polyethylene glycol (MIRALAX) 17 g packet, Take 17 g by mouth daily (Patient not taking: Reported on 6/3/2024), Disp: 30 each, Rfl: 0    Results/Data: We reviewed his arteriogram in detail as well as the report.

## 2024-06-04 ENCOUNTER — TELEPHONE (OUTPATIENT)
Dept: NEUROLOGY | Facility: CLINIC | Age: 64
End: 2024-06-04

## 2024-06-04 NOTE — TELEPHONE ENCOUNTER
Spoke to Faiza to offer pt a sooner appt w/ Dr. Brasher in [facility] on 6/5/24 at 10. Pt declined as she would need to take off of work and its short notice.

## 2024-06-14 NOTE — PLAN OF CARE
-Endocrine   Reviewed    Problem: PAIN - ADULT  Goal: Verbalizes/displays adequate comfort level or baseline comfort level  Description: Interventions:  - Encourage patient to monitor pain and request assistance  - Assess pain using appropriate pain scale  - Administer analgesics based on type and severity of pain and evaluate response  - Implement non-pharmacological measures as appropriate and evaluate response  - Consider cultural and social influences on pain and pain management  - Notify physician/advanced practitioner if interventions unsuccessful or patient reports new pain  Outcome: Progressing     Problem: INFECTION - ADULT  Goal: Absence or prevention of progression during hospitalization  Description: INTERVENTIONS:  - Assess and monitor for signs and symptoms of infection  - Monitor lab/diagnostic results  - Monitor all insertion sites, i e  indwelling lines, tubes, and drains  - Monitor endotracheal if appropriate and nasal secretions for changes in amount and color  - [place] appropriate cooling/warming therapies per order  - Administer medications as ordered  - Instruct and encourage patient and family to use good hand hygiene technique  - Identify and instruct in appropriate isolation precautions for identified infection/condition  Outcome: Progressing  Goal: Absence of fever/infection during neutropenic period  Description: INTERVENTIONS:  - Monitor WBC    Outcome: Progressing     Problem: SAFETY ADULT  Goal: Patient will remain free of falls  Description: INTERVENTIONS:  - Educate patient/family on patient safety including physical limitations  - Instruct patient to call for assistance with activity   - Consult OT/PT to assist with strengthening/mobility   - Keep Call bell within reach  - Keep bed low and locked with side rails adjusted as appropriate  - Keep care items and personal belongings within reach  - Initiate and maintain comfort rounds  - Make Fall Risk Sign visible to staff  - Offer Toileting every 4 Hours, in advance of need  - Initiate/Maintain bed and chair alarm  - Apply yellow socks and bracelet for high fall risk patients  - Consider moving patient to room near nurses station  Outcome: Progressing  Goal: Maintain or return to baseline ADL function  Description: INTERVENTIONS:  -  Assess patient's ability to carry out ADLs; assess patient's baseline for ADL function and identify physical deficits which impact ability to perform ADLs (bathing, care of mouth/teeth, toileting, grooming, dressing, etc )  - Assess/evaluate cause of self-care deficits   - Assess range of motion  - Assess patient's mobility; develop plan if impaired  - Assess patient's need for assistive devices and provide as appropriate  - Encourage maximum independence but intervene and supervise when necessary  - Involve family in performance of ADLs  - Assess for home care needs following discharge   - Consider OT consult to assist with ADL evaluation and planning for discharge  - Provide patient education as appropriate  Outcome: Progressing  Goal: Maintains/Returns to pre admission functional level  Description: INTERVENTIONS:  - Set and communicate daily mobility goal to care team and patient/family/caregiver     - Collaborate with rehabilitation services on mobility goals if consulted  - Out of bed for toileting  - Record patient progress and toleration of activity level   Outcome: Progressing     Problem: DISCHARGE PLANNING  Goal: Discharge to home or other facility with appropriate resources  Description: INTERVENTIONS:  - Identify barriers to discharge w/patient and caregiver  - Arrange for needed discharge resources and transportation as appropriate  - Identify discharge learning needs (meds, wound care, etc )  - Arrange for interpretive services to assist at discharge as needed  - Refer to Case Management Department for coordinating discharge planning if the patient needs post-hospital services based on physician/advanced practitioner order or complex needs related to functional status, cognitive ability, or social support system  Outcome: Progressing     Problem: Prexisting or High Potential for Compromised Skin Integrity  Goal: Skin integrity is maintained or improved  Description: INTERVENTIONS:  - Identify patients at risk for skin breakdown  - Assess and monitor skin integrity  - Assess and monitor nutrition and hydration status  - Monitor labs   - Assess for incontinence   - Turn and reposition patient  - Assist with mobility/ambulation  - Relieve pressure over bony prominences  - Avoid friction and shearing  - Provide appropriate hygiene as needed including keeping skin clean and dry  - Evaluate need for skin moisturizer/barrier cream  - Collaborate with interdisciplinary team   - Patient/family teaching  - Consider wound care consult   Outcome: Progressing     Problem: MOBILITY - ADULT  Goal: Maintain or return to baseline ADL function  Description: INTERVENTIONS:  -  Assess patient's ability to carry out ADLs; assess patient's baseline for ADL function and identify physical deficits which impact ability to perform ADLs (bathing, care of mouth/teeth, toileting, grooming, dressing, etc )  - Assess/evaluate cause of self-care deficits   - Assess range of motion  - Assess patient's mobility; develop plan if impaired  - Assess patient's need for assistive devices and provide as appropriate  - Encourage maximum independence but intervene and supervise when necessary  - Involve family in performance of ADLs  - Assess for home care needs following discharge   - Consider OT consult to assist with ADL evaluation and planning for discharge  - Provide patient education as appropriate  Outcome: Progressing  Goal: Maintains/Returns to pre admission functional level  Description: INTERVENTIONS:  - Set and communicate daily mobility goal to care team and patient/family/caregiver     - Collaborate with rehabilitation services on mobility goals if consulted  - Out of bed for toileting  - Record patient progress and toleration of activity level   Outcome: Progressing     Problem: Nutrition/Hydration-ADULT  Goal: Nutrient/Hydration intake appropriate for improving, restoring or maintaining nutritional needs  Description: Monitor and assess patient's nutrition/hydration status for malnutrition  Collaborate with interdisciplinary team and initiate plan and interventions as ordered  Monitor patient's weight and dietary intake as ordered or per policy  Utilize nutrition screening tool and intervene as necessary  Determine patient's food preferences and provide high-protein, high-caloric foods as appropriate       INTERVENTIONS:  - Monitor oral intake, urinary output, labs, and treatment plans  - Assess nutrition and hydration status and recommend course of action  - Evaluate amount of meals eaten  - Assist patient with eating if necessary   - Allow adequate time for meals  - Recommend/ encourage appropriate diets, oral nutritional supplements, and vitamin/mineral supplements  - Order, calculate, and assess calorie counts as needed  - Recommend, monitor, and adjust tube feedings and TPN/PPN based on assessed needs  - Assess need for intravenous fluids  - Provide specific nutrition/hydration education as appropriate  - Include patient/family/caregiver in decisions related to nutrition  Outcome: Progressing

## 2024-06-18 ENCOUNTER — OFFICE VISIT (OUTPATIENT)
Dept: ENDOCRINOLOGY | Facility: HOSPITAL | Age: 64
End: 2024-06-18
Payer: COMMERCIAL

## 2024-06-18 VITALS
BODY MASS INDEX: 31.47 KG/M2 | HEART RATE: 76 BPM | HEIGHT: 66 IN | DIASTOLIC BLOOD PRESSURE: 72 MMHG | SYSTOLIC BLOOD PRESSURE: 120 MMHG

## 2024-06-18 DIAGNOSIS — E04.2 MULTIPLE THYROID NODULES: Primary | ICD-10-CM

## 2024-06-18 PROCEDURE — 99213 OFFICE O/P EST LOW 20 MIN: CPT | Performed by: INTERNAL MEDICINE

## 2024-06-18 RX ORDER — LIDOCAINE 50 MG/G
1 PATCH TOPICAL DAILY
COMMUNITY

## 2024-06-18 RX ORDER — UREA 10 %
6 LOTION (ML) TOPICAL
COMMUNITY

## 2024-06-18 NOTE — PROGRESS NOTES
6/18/2024    Assessment & Plan      Diagnoses and all orders for this visit:    Multiple thyroid nodules  -     T4, free; Future  -     TSH, 3rd generation; Future  -     US thyroid; Future    Other orders  -     lidocaine (LIDODERM) 5 %; Apply 1 patch topically daily Remove & Discard patch within 12 hours or as directed by MD  -     melatonin 1 mg; Take 6 mg by mouth daily at bedtime        Assessment & Plan  1. Multiple thyroid nodules.  The patient's most recent thyroid function studies indicate biochemical euthyroidism, with a normal TSH and free T4. A thyroid ultrasound revealed a decrease in the right upper lobe T-RAD 4 thyroid nodule by a few millimeters. The previous biopsy was benign, hence, the current plan is to continue monitoring the condition over time.    Follow-up  The patient is scheduled for a follow-up visit in 2 years, which will include a preceding thyroid ultrasound, TSH, and free T4.        CC: Thyroid nodule follow-up    History of Present Illness    HPI: Constanza Zhu  is a 64-year-old male with a history of incidentally discovered thyroid nodules, here for a follow-up visit. He is accompanied by his daughter.    He was admitted to the hospital in late May 2023 with CVA and found to have a thyroid nodule on CT scan of the head and neck at the end of May 2023. The nodule supposedly was increased in size from 2019. He then had a thyroid ultrasound demonstrating 2 thyroid nodules 1 of which was T RADS 4 at 1.6 cm and biopsy was recommended.     The patient's daughter reports that he received a COVID-19 vaccine in 04/2024, following which he experienced severe weakness, rendering him unable to rise from the toilet. Concurrently, he suffered another minor stroke, necessitating the placement of a stent in one of his neck arteries last month. His speech has since improved, although he continues to experience fatigue. His sleep pattern is generally good, with occasional nocturnal awakenings. He  denies experiencing tachycardia, palpitations, or tremors. He does, however, report feeling cold. He is currently on two different anticoagulants. He denies gastrointestinal symptoms such as diarrhea or constipation. He also denies experiencing anxiety, nervousness, or depression. He does report dry skin, hair loss, and brittle nails. His weight has remained stable, and he denies any dysphagia.        Historical Information   Past Medical History:   Diagnosis Date    Depression     Diabetes mellitus (HCC)     patient denies    Dyslipidemia 03/26/2019    GERD (gastroesophageal reflux disease)     Hyperlipidemia     Hypertension     Prediabetes     Prediabetes 04/03/2019    Stroke (HCC)      Past Surgical History:   Procedure Laterality Date    ARTHROSCOPIC REPAIR ACL Left     BACK SURGERY      twice    CARPAL TUNNEL RELEASE Right     IR CEREBRAL ANGIOGRAPHY  05/04/2023    IR STROKE ALERT  03/19/2019    SHOULDER SURGERY Left     US GUIDED THYROID BIOPSY  11/21/2023     Social History   Social History     Substance and Sexual Activity   Alcohol Use Never     Social History     Substance and Sexual Activity   Drug Use Never     Social History     Tobacco Use   Smoking Status Former   Smokeless Tobacco Never     Family History:   Family History   Problem Relation Age of Onset    Hyperlipidemia Mother     Hypertension Mother     Diabetes unspecified Mother         prediabetes    Heart attack Mother     Diabetes Mother     Hyperlipidemia Father     Hypertension Father     Prostate cancer Father     Stroke Father     Hyperlipidemia Sister     Hypertension Sister     Hyperlipidemia Sister     Hypertension Sister     Depression Sister     Hypertension Sister     Hyperlipidemia Sister     Depression Sister     Hyperlipidemia Brother     Hypertension Brother     Hypertension Brother     Prostate cancer Brother     Hypothyroidism Daughter     Hypothyroidism Son     Diabetes unspecified Son     Seizures Neg Hx         Meds/Allergies   Current Outpatient Medications   Medication Sig Dispense Refill    acetaminophen (TYLENOL) 500 mg tablet Take 500 mg by mouth as needed for mild pain      aspirin (ECOTRIN LOW STRENGTH) 81 mg EC tablet Take 1 tablet (81 mg total) by mouth daily Do not start before April 19, 2023. 30 tablet 0    atorvastatin (LIPITOR) 40 mg tablet Take 40 mg by mouth daily with breakfast      baclofen 5 MG TABS Take 5 mg by mouth 2 (two) times a day as needed for muscle spasms  0    cilostazol (PLETAL) 100 mg tablet Take 1 tablet (100 mg total) by mouth 2 (two) times a day before meals 60 tablet 0    donepezil (ARICEPT) 10 mg tablet Take 1 tablet (10 mg total) by mouth in the morning 60 tablet 2    hydroCHLOROthiazide 12.5 mg capsule Take 12.5 mg by mouth every morning      levETIRAcetam (Keppra) 500 mg tablet Take 1 tab (500 mg) twice a day for 1 week, then take 2 tabs (1000 mg) twice a day. 360 tablet 3    lidocaine (LIDODERM) 5 % Apply 1 patch topically daily Remove & Discard patch within 12 hours or as directed by MD      losartan (COZAAR) 50 mg tablet Take 50 mg by mouth daily      melatonin 1 mg Take 6 mg by mouth daily at bedtime      metoprolol succinate (TOPROL-XL) 100 mg 24 hr tablet Take 100 mg by mouth daily      pantoprazole (PROTONIX) 40 mg tablet Take 1 tablet (40 mg total) by mouth daily (Patient taking differently: Take 20 mg by mouth daily) 90 tablet 0    tamsulosin (FLOMAX) 0.4 mg Take 1 capsule (0.4 mg total) by mouth daily with dinner 90 capsule 3    ticagrelor (BRILINTA) 90 MG Take 1 tablet (90 mg total) by mouth every 12 (twelve) hours 60 tablet 3    traZODone (DESYREL) 100 mg tablet Take 100 mg by mouth daily at bedtime      albuterol (PROVENTIL HFA,VENTOLIN HFA) 90 mcg/act inhaler  (Patient not taking: Reported on 6/18/2024)       No current facility-administered medications for this visit.     Allergies   Allergen Reactions    Flexeril [Cyclobenzaprine] Drowsiness    Lisinopril Cough  "   Nsaids Confusion       Objective   Vitals: Blood pressure 120/72, pulse 76, height 5' 6\" (1.676 m).  Invasive Devices       None                   Physical Exam  Physical exam normal with pertinent positives and negatives.    The thyroid in the neck is normal in its size with no palpable nodules. No lymphadenopathy in the neck.  Lungs are clear to auscultation.  Heart has a regular rate and rhythm. No murmurs.  No tremor in the outstretched hands.  Patellar deep tendon reflexes are diminished.      The history was obtained from the review of the chart and from the patient.    Lab Results:      Blood work performed on 5/1/2024 showed a TSH of 1.375 with a free T4 of 0.86.    Lab Results   Component Value Date    CREATININE 0.89 05/21/2024    CREATININE 1.10 05/20/2024    CREATININE 1.05 05/01/2024    BUN 24 05/21/2024    K 3.7 05/21/2024     05/21/2024    CO2 24 05/21/2024     eGFR   Date Value Ref Range Status   05/21/2024 89 ml/min/1.73sq m Final   04/16/2019 81 ml/min/1.73sq m Final         Lab Results   Component Value Date    HDL 26 (L) 04/14/2024    TRIG 121 04/14/2024       Lab Results   Component Value Date    ALT 15 05/20/2024    AST 14 05/20/2024    ALKPHOS 92 05/20/2024       Lab Results   Component Value Date    FREET4 0.86 05/01/2024     Thyroid ultrasound:    THYROID ULTRASOUND performed on 5/13/2024     INDICATION: 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     COMPARISON: Thyroid ultrasound 6/1/2023     TECHNIQUE: Ultrasound of the thyroid was performed with a high frequency linear transducer in transverse and sagittal planes including volumetric imaging sweeps as well as traditional still imaging technique.     FINDINGS:  Thyroid texture: Normal homogeneous smooth echotexture.     Right lobe: 4.0 x 2.0 x 1.6 cm. Volume 6.1 mL  Left lobe: 3.8 x 2.1 x 1.8 cm. Volume 7.0 mL  Isthmus: 0.2 cm.     Nodule #1. Image 11.  RIGHT upper pole nodule measuring 1.4 x 0.8 x 1.3 cm.  COMPOSITION: 2 points, solid " or almost completely solid.  ECHOGENICITY: 2 points, hypoechoic.  SHAPE: 0 points, wider-than-tall.  MARGIN: 0 points, smooth.  ECHOGENIC FOCI: 0 points, none or large comet-tail artifacts.  TI-RADS Classification: TR 4 (4-6 points), FNA if > 1.5 cm. Follow if > 1 cm. This nodule has had a previous benign biopsy. As it is stable, no further sampling recommended at this time. Further surveillance at 2 year intervals could be considered to   confirm continued stability for a period of greater than 5 years.     There are additional nodules of lesser size and/or TI-RADS score, mostly reflecting colloid cysts. These do not necessitate additional evaluation based on ACR criteria.        IMPRESSION:     No nodule meets current ACR criteria for requiring biopsy but follow-up ultrasound is recommended in 2 years.           Future Appointments   Date Time Provider Department Center   7/3/2024  8:00 AM [facility] VASCULAR 1 [facility]   7/3/2024  9:00 AM [facility] VASCULAR 1 [facility]   7/19/2024  3:30 PM Tere Oates MD [facility]   7/23/2024 10:00 AM Maddie Steele MD [facility]   9/3/2024  2:00 PM [facility] VASCULAR 1 [facility]  [facility]OSPITAL   9/11/2024  1:30 PM Glenn Mattson MD NS[facility]   9/17/2024  8:30 AM Mickey Brasher MD NEURO CTR  [facility]   11/7/2024  4:00 PM Keegan Viramontes MD [facility]

## 2024-06-18 NOTE — PATIENT INSTRUCTIONS
The thyroid blood work is normal.     The thyroid ultrasound shows stable nodule size.     It was previously biopsied benign.     We will have you follow up in 2 years with blood work and thyroid ultrasound.

## 2024-06-25 ENCOUNTER — TELEPHONE (OUTPATIENT)
Dept: NEUROSURGERY | Facility: CLINIC | Age: 64
End: 2024-06-25

## 2024-06-25 DIAGNOSIS — I65.21 CAROTID STENOSIS, RIGHT: ICD-10-CM

## 2024-06-25 DIAGNOSIS — Z86.73 CHRONIC ISCHEMIC LEFT MCA STROKE: ICD-10-CM

## 2024-06-25 DIAGNOSIS — I65.22 LEFT CAROTID STENOSIS: Primary | ICD-10-CM

## 2024-06-25 NOTE — TELEPHONE ENCOUNTER
Reached out to Constanza at the request of Dr. Mattson to remind him to discontinue his pletal and have P2Y12 redrawn on 7/1/2024.

## 2024-06-25 NOTE — TELEPHONE ENCOUNTER
Callback received from Faiza. She states Constanza stopped his Pletal on 6/22 and is having other tests and bloodwork done on 7/3. She has asked if they can wait until them to have the P2Y12 drawn. Advised this should be ok. Will FYI Dr. Mattson and contact with objections. Will reach out to her once labs has returned to advise of next steps.

## 2024-06-27 ENCOUNTER — TELEPHONE (OUTPATIENT)
Dept: NEUROLOGY | Facility: CLINIC | Age: 64
End: 2024-06-27

## 2024-06-27 NOTE — TELEPHONE ENCOUNTER
6/27/24 lvm to r/s appt on 7/23/24 with dr. Steele due to provider not being in the office. Pls r/s if pt calls back

## 2024-06-28 ENCOUNTER — TELEPHONE (OUTPATIENT)
Dept: NEUROLOGY | Facility: CLINIC | Age: 64
End: 2024-06-28

## 2024-06-28 NOTE — TELEPHONE ENCOUNTER
6/28/24 LVM TO R/S APPT ON 7/23/24 DUE DR. RODRIGES NOT BEING IN THE OFFICE. PLS R/S IF PT CALLS BACK

## 2024-07-03 ENCOUNTER — HOSPITAL ENCOUNTER (OUTPATIENT)
Dept: NON INVASIVE DIAGNOSTICS | Facility: HOSPITAL | Age: 64
Discharge: HOME | End: 2024-07-03
Attending: SURGERY
Payer: COMMERCIAL

## 2024-07-03 ENCOUNTER — APPOINTMENT (OUTPATIENT)
Dept: LAB | Facility: CLINIC | Age: 64
End: 2024-07-03
Payer: COMMERCIAL

## 2024-07-03 DIAGNOSIS — Z01.812 PRE-OPERATIVE LABORATORY EXAMINATION: ICD-10-CM

## 2024-07-03 DIAGNOSIS — Z86.73 CHRONIC ISCHEMIC LEFT MCA STROKE: ICD-10-CM

## 2024-07-03 DIAGNOSIS — I71.43 INFRARENAL ABDOMINAL AORTIC ANEURYSM (AAA) WITHOUT RUPTURE (HCC): ICD-10-CM

## 2024-07-03 DIAGNOSIS — I65.21 CAROTID STENOSIS, RIGHT: ICD-10-CM

## 2024-07-03 DIAGNOSIS — I65.22 LEFT CAROTID STENOSIS: ICD-10-CM

## 2024-07-03 DIAGNOSIS — I65.23 BILATERAL CAROTID ARTERY STENOSIS: ICD-10-CM

## 2024-07-03 DIAGNOSIS — I73.9 CLAUDICATION OF BOTH LOWER EXTREMITIES (HCC): ICD-10-CM

## 2024-07-03 LAB
ALBUMIN SERPL BCG-MCNC: 4.1 G/DL (ref 3.5–5)
ALP SERPL-CCNC: 82 U/L (ref 34–104)
ALT SERPL W P-5'-P-CCNC: 22 U/L (ref 7–52)
ANION GAP SERPL CALCULATED.3IONS-SCNC: 14 MMOL/L (ref 4–13)
APTT PPP: 28 SECONDS (ref 23–37)
AST SERPL W P-5'-P-CCNC: 16 U/L (ref 13–39)
BILIRUB SERPL-MCNC: 0.58 MG/DL (ref 0.2–1)
BUN SERPL-MCNC: 24 MG/DL (ref 5–25)
CALCIUM SERPL-MCNC: 9.8 MG/DL (ref 8.4–10.2)
CHLORIDE SERPL-SCNC: 103 MMOL/L (ref 96–108)
CO2 SERPL-SCNC: 26 MMOL/L (ref 21–32)
CREAT SERPL-MCNC: 0.99 MG/DL (ref 0.6–1.3)
GFR SERPL CREATININE-BSD FRML MDRD: 79 ML/MIN/1.73SQ M
GLUCOSE SERPL-MCNC: 142 MG/DL (ref 65–140)
INR PPP: 1.06 (ref 0.84–1.19)
PA ADP BLD-ACNC: 29 PRU (ref 194–418)
POTASSIUM SERPL-SCNC: 4 MMOL/L (ref 3.5–5.3)
PROT SERPL-MCNC: 7.1 G/DL (ref 6.4–8.4)
PROTHROMBIN TIME: 13.7 SECONDS (ref 11.6–14.5)
SODIUM SERPL-SCNC: 143 MMOL/L (ref 135–147)

## 2024-07-03 PROCEDURE — 36415 COLL VENOUS BLD VENIPUNCTURE: CPT

## 2024-07-03 PROCEDURE — 85610 PROTHROMBIN TIME: CPT

## 2024-07-03 PROCEDURE — 85576 BLOOD PLATELET AGGREGATION: CPT

## 2024-07-03 PROCEDURE — 80053 COMPREHEN METABOLIC PANEL: CPT

## 2024-07-03 PROCEDURE — 93978 VASCULAR STUDY: CPT

## 2024-07-03 PROCEDURE — 93925 LOWER EXTREMITY STUDY: CPT

## 2024-07-03 PROCEDURE — 85730 THROMBOPLASTIN TIME PARTIAL: CPT

## 2024-07-03 PROCEDURE — 93923 UPR/LXTR ART STDY 3+ LVLS: CPT

## 2024-07-05 ENCOUNTER — TELEPHONE (OUTPATIENT)
Dept: NEUROLOGY | Facility: CLINIC | Age: 64
End: 2024-07-05

## 2024-07-05 NOTE — TELEPHONE ENCOUNTER
7/5/24 pt was called to r/s on 7/23/24. Due to  not in the office on such date. Pls r/s if pt calls back !

## 2024-07-08 PROCEDURE — 93925 LOWER EXTREMITY STUDY: CPT | Performed by: SURGERY

## 2024-07-08 PROCEDURE — 93922 UPR/L XTREMITY ART 2 LEVELS: CPT | Performed by: SURGERY

## 2024-07-08 PROCEDURE — 93978 VASCULAR STUDY: CPT | Performed by: SURGERY

## 2024-07-09 ENCOUNTER — TELEPHONE (OUTPATIENT)
Dept: VASCULAR SURGERY | Facility: CLINIC | Age: 64
End: 2024-07-09

## 2024-07-09 ENCOUNTER — TELEPHONE (OUTPATIENT)
Dept: NEUROLOGY | Facility: CLINIC | Age: 64
End: 2024-07-09

## 2024-07-09 NOTE — TELEPHONE ENCOUNTER
R/S needed for [facility] appt 7/19/24, CV rescheduled for 9/3/24 called patient to reschedule - unable to leave vm, appt not cancelled as I didn't reach the patient.

## 2024-07-15 ENCOUNTER — TELEPHONE (OUTPATIENT)
Dept: NEUROLOGY | Facility: CLINIC | Age: 64
End: 2024-07-15

## 2024-07-15 NOTE — TELEPHONE ENCOUNTER
Pts wife called in to reschedule appt for 7/23/24. No earlier OVS available. Pt accepted 10/09/24 at 2pm. Pt added to waitlist.

## 2024-07-15 NOTE — TELEPHONE ENCOUNTER
7/15/24 LVM TO R/S APPT ON 7/23/24 WITH  DUE TO PROVIDER NOT BEING IN THE OFFICE. PlS R/S APPT IF PT CALLS BACK, INFORMED THERE IS OPENINGS ON EARLIER DATES. THANKS IN ADVANCE

## 2024-07-19 ENCOUNTER — OFFICE VISIT (OUTPATIENT)
Dept: VASCULAR SURGERY | Facility: CLINIC | Age: 64
End: 2024-07-19
Payer: COMMERCIAL

## 2024-07-19 VITALS
OXYGEN SATURATION: 97 % | WEIGHT: 185 LBS | DIASTOLIC BLOOD PRESSURE: 90 MMHG | HEART RATE: 68 BPM | SYSTOLIC BLOOD PRESSURE: 150 MMHG | BODY MASS INDEX: 29.73 KG/M2 | HEIGHT: 66 IN

## 2024-07-19 DIAGNOSIS — I65.23 CAROTID STENOSIS, BILATERAL: ICD-10-CM

## 2024-07-19 DIAGNOSIS — I71.43 INFRARENAL ABDOMINAL AORTIC ANEURYSM (AAA) WITHOUT RUPTURE (HCC): Primary | ICD-10-CM

## 2024-07-19 DIAGNOSIS — I77.1 ILIAC ARTERY STENOSIS, LEFT (HCC): ICD-10-CM

## 2024-07-19 PROCEDURE — 99214 OFFICE O/P EST MOD 30 MIN: CPT | Performed by: SURGERY

## 2024-07-19 NOTE — TELEPHONE ENCOUNTER
Patient had a CV done in may that was ordered from another provider after his stent was put in. Patient is still coming to 7/19/24 apt with Dr. Oates

## 2024-07-19 NOTE — PATIENT INSTRUCTIONS
1. Infrarenal abdominal aortic aneurysm (AAA) without rupture (HCC)  Assessment & Plan:  Small AAA and left iliac ectasia. Denies abdominal or back pain.     AOID - 3.4cm AAA, left 1.8cm MAGO     -We discussed the pathophysiology of aneurysmal disease, available treatment options and indications for treatment. Discussed criteria for intervention including AAA size>=5.5cm and iliac size >=3.5cm, growth >0.5cm in 6 months, symptomatic aneurysm or rupture.   -Discussed risk factors including smoking, male gender, connective tissue disorders, HTN, COPD, family history and possible genetic predisposition. Also discussed association with aneurysms elsewhere. He does not have evidence of popliteal aneurysm.  -Discussed signs and symptoms of symptomatic AAA/aortic rupture  -Recommend continued surveillance with abdominal aorta/iliac duplex in 1 year. No intervention recommended at this time.  Orders:  -     VAS abdominal aorta/iliac duplex; Future; Expected date: 07/14/2025  2. Carotid stenosis, bilateral  Assessment & Plan:  History of b/l carotid artery stenosis with bilateral MCA strokes, right MCA thrombectomy 2019, left MCA thrombectomy and left ICA stenting 5/2023 with Dr. Mattson. He had recrudescent stroke symptoms with small acute left sided stroke in April and uses a walker. He was noted to have progression in right carotid disease with patent left ICA stent on CTA though no new stroke on the right. He underwent right carotid stenting with Dr. Mattson 5/2024. Since his procedure he reports improvement in his memory and vision, ambulation with overall better mental clarity per his wife.     Carotid duplex - patent bilateral ICA stent, moderate right ECA stenosis     -We discussed the pathophysiology of carotid disease, management/surveillance following intervention. Defer further carotid duplex studies to Dr. Mattson.  -Continue optimization of medical management per neurosurgery. Currently on ASA, brilinta and statin.    -Advised to return sooner or go to the ED if he develops any TIA/CVA symptoms.     3. Iliac artery stenosis, left (HCC)  Assessment & Plan:  History of bilateral leg weakness and pain, improved per his wife since right carotid stenting. Also reports frequent dehydration with improvement of calf cramping with drinking water. Has known left EIA 50-75% stenosis on AOID without significant infrainguinal stenosis. Pletal was recently discontinued after lab testing.     LEAD -  right HERRERA 0.99/[id number]/[id number]; left HERRERA 0.95/MTP94/GTP70     -We discussed the pathophysiology of aortoiliac occlusive disease, available treatment options and indications for treatment.  -Continue medical optimization. Currently on ASA and statin. Goal hgb A1c<7.0, LDL<70, BP <130/80  -Recommend walking program, ambulating at least 30 minutes for 3-5 times weekly  -Recommend moisturization of the lower extremities, avoidance of injury and close observation of bilateral feet for any new wounds  -Follow-up in 1 yr with AOID to re-evaluate symptoms.

## 2024-07-19 NOTE — ASSESSMENT & PLAN NOTE
History of bilateral leg weakness and pain, improved per his wife since right carotid stenting. Also reports frequent dehydration with improvement of calf cramping with drinking water. Has known left EIA 50-75% stenosis on AOID without significant infrainguinal stenosis. Pletal was recently discontinued after lab testing.    LEAD -  right HERRERA 0.99/HXT544/GTP87; left HERRERA 0.95/MTP94/GTP70    -We discussed the pathophysiology of aortoiliac occlusive disease, available treatment options and indications for treatment.  -Continue medical optimization. Currently on ASA and statin. Goal hgb A1c<7.0, LDL<70, BP <130/80  -Recommend walking program, ambulating at least 30 minutes for 3-5 times weekly  -Recommend moisturization of the lower extremities, avoidance of injury and close observation of bilateral feet for any new wounds  -Follow-up in 1 yr with AOID to re-evaluate symptoms.

## 2024-07-19 NOTE — PROGRESS NOTES
Ambulatory Visit  Name: Constanza Zhu      : 1960      MRN: [id number]  Encounter Provider: Tere Oates MD  Encounter Date: 2024   Encounter department: [facility] [place]    Assessment & Plan   1. Infrarenal abdominal aortic aneurysm (AAA) without rupture (HCC)  Assessment & Plan:  Small AAA and left iliac ectasia. Denies abdominal or back pain.    AOID - 3.4cm AAA, left 1.8cm MAGO    -We discussed the pathophysiology of aneurysmal disease, available treatment options and indications for treatment. Discussed criteria for intervention including AAA size>=5.5cm and iliac size >=3.5cm, growth >0.5cm in 6 months, symptomatic aneurysm or rupture.   -Discussed risk factors including smoking, male gender, connective tissue disorders, HTN, COPD, family history and possible genetic predisposition. Also discussed association with aneurysms elsewhere. He does not have evidence of popliteal aneurysm.  -Discussed signs and symptoms of symptomatic AAA/aortic rupture  -Recommend continued surveillance with abdominal aorta/iliac duplex in 1 year. No intervention recommended at this time.  Orders:  -     VAS abdominal aorta/iliac duplex; Future; Expected date: 2025  2. Carotid stenosis, bilateral  Assessment & Plan:  History of b/l carotid artery stenosis with bilateral MCA strokes, right MCA thrombectomy , left MCA thrombectomy and left ICA stenting 2023 with Dr. Mattson. He had recrudescent stroke symptoms with small acute left sided stroke in April and uses a walker. He was noted to have progression in right carotid disease with patent left ICA stent on CTA though no new stroke on the right. He underwent right carotid stenting with Dr. Mattson 2024. Since his procedure he reports improvement in his memory and vision, ambulation with overall better mental clarity per his wife.    Carotid duplex - patent bilateral ICA stent, moderate right ECA stenosis    -We discussed the  pathophysiology of carotid disease, management/surveillance following intervention. Defer further carotid duplex studies to Dr. Mattson.  -Continue optimization of medical management per neurosurgery. Currently on ASA, brilinta and statin.   -Advised to return sooner or go to the ED if he develops any TIA/CVA symptoms.    3. Iliac artery stenosis, left (HCC)  Assessment & Plan:  History of bilateral leg weakness and pain, improved per his wife since right carotid stenting. Also reports frequent dehydration with improvement of calf cramping with drinking water. Has known left EIA 50-75% stenosis on AOID without significant infrainguinal stenosis. Pletal was recently discontinued after lab testing.    LEAD -  right HERRERA 0.99/UAC755/GTP87; left HERRERA 0.95/MTP94/GTP70    -We discussed the pathophysiology of aortoiliac occlusive disease, available treatment options and indications for treatment.  -Continue medical optimization. Currently on ASA and statin. Goal hgb A1c<7.0, LDL<70, BP <130/80  -Recommend walking program, ambulating at least 30 minutes for 3-5 times weekly  -Recommend moisturization of the lower extremities, avoidance of injury and close observation of bilateral feet for any new wounds  -Follow-up in 1 yr with AOID to re-evaluate symptoms.        History of Present Illness     Patient is s/p IR cerebral angiography + carotid intervention done in May, presents for review CV 5/31/24, AOIL and MARCELLO done 7/3/24. He denies cramping, burning pain when walking of bilateral lower extremities. He denies wounds. He denies abdominal pain, pain after eating or low back pain. He denies any new TIA/CVA symptoms.  He is taking ASA 81 mg, Atorvastatin, and Brilinta.       See assessment & plan    Review of Systems   Constitutional: Negative.    HENT: Negative.     Eyes: Negative.  Negative for visual disturbance.   Respiratory: Negative.     Cardiovascular: Negative.    Gastrointestinal: Negative.    Endocrine: Negative.   "  Genitourinary: Negative.    Musculoskeletal:  Positive for gait problem.        Ambulates with walker   Skin: Negative.    Allergic/Immunologic: Negative.    Neurological:  Negative for dizziness, facial asymmetry, speech difficulty, weakness, light-headedness and headaches.   Hematological: Negative.    Psychiatric/Behavioral: Negative.       I have personally reviewed the ROS entered by MA and agree as documented.    Objective     /90 (BP Location: Right arm, Patient Position: Sitting, Cuff Size: Standard)   Pulse 68   Ht 5' 6\" (1.676 m)   Wt 83.9 kg (185 lb)   SpO2 97%   BMI 29.86 kg/m²     Physical Exam  Constitutional:       Appearance: Normal appearance.   HENT:      Head: Normocephalic and atraumatic.   Cardiovascular:      Rate and Rhythm: Normal rate.      Pulses: Normal pulses.   Pulmonary:      Effort: Pulmonary effort is normal.   Abdominal:      General: There is no distension.      Palpations: Abdomen is soft.      Tenderness: There is no abdominal tenderness.   Musculoskeletal:         General: Normal range of motion.      Cervical back: Normal range of motion and neck supple.   Skin:     General: Skin is warm and dry.      Capillary Refill: Capillary refill takes less than 2 seconds.   Neurological:      General: No focal deficit present.      Mental Status: He is alert and oriented to person, place, and time.   Psychiatric:         Mood and Affect: Mood normal.         Behavior: Behavior normal.         Thought Content: Thought content normal.         Judgment: Judgment normal.       Administrative Statements   I have spent a total time of 35 minutes in caring for this patient on the day of the visit/encounter including Diagnostic results, Prognosis, Risks and benefits of tx options, Instructions for management, Patient and family education, Importance of tx compliance, Risk factor reductions, Impressions, Counseling / Coordination of care, Documenting in the medical record, Reviewing / " ordering tests, medicine, procedures  , and Obtaining or reviewing history  .

## 2024-07-19 NOTE — ASSESSMENT & PLAN NOTE
History of b/l carotid artery stenosis with bilateral MCA strokes, right MCA thrombectomy 2019, left MCA thrombectomy and left ICA stenting 5/2023 with Dr. Mattson. He had recrudescent stroke symptoms with small acute left sided stroke in April and uses a walker. He was noted to have progression in right carotid disease with patent left ICA stent on CTA though no new stroke on the right. He underwent right carotid stenting with Dr. Mattson 5/2024. Since his procedure he reports improvement in his memory and vision, ambulation with overall better mental clarity per his wife.    Carotid duplex - patent bilateral ICA stent, moderate right ECA stenosis    -We discussed the pathophysiology of carotid disease, management/surveillance following intervention. Defer further carotid duplex studies to Dr. Mattson.  -Continue optimization of medical management per neurosurgery. Currently on ASA, brilinta and statin.   -Advised to return sooner or go to the ED if he develops any TIA/CVA symptoms.

## 2024-07-19 NOTE — ASSESSMENT & PLAN NOTE
Small AAA and left iliac ectasia. Denies abdominal or back pain.    AOID - 3.4cm AAA, left 1.8cm MAGO    -We discussed the pathophysiology of aneurysmal disease, available treatment options and indications for treatment. Discussed criteria for intervention including AAA size>=5.5cm and iliac size >=3.5cm, growth >0.5cm in 6 months, symptomatic aneurysm or rupture.   -Discussed risk factors including smoking, male gender, connective tissue disorders, HTN, COPD, family history and possible genetic predisposition. Also discussed association with aneurysms elsewhere. He does not have evidence of popliteal aneurysm.  -Discussed signs and symptoms of symptomatic AAA/aortic rupture  -Recommend continued surveillance with abdominal aorta/iliac duplex in 1 year. No intervention recommended at this time.

## 2024-08-19 ENCOUNTER — TELEPHONE (OUTPATIENT)
Age: 64
End: 2024-08-19

## 2024-08-19 NOTE — TELEPHONE ENCOUNTER
[facility] is requesting last office summary of pt- he is a mutual patient     Fax- [phone]

## 2024-09-03 ENCOUNTER — TELEPHONE (OUTPATIENT)
Dept: NEUROLOGY | Facility: CLINIC | Age: 64
End: 2024-09-03

## 2024-09-03 NOTE — TELEPHONE ENCOUNTER
Spoke to Faiza (spouse) to remind patient of his appt with  on 9/17/2024.  Faiza wanted to let  know that Constanza was  admitted to [facility] on 8/31/2024 after some episodes of staring, unresponsive and zoning out.    Faiza is requesting a call from Dr. Brasher .   Patient is still inpatient at [facility]

## 2024-09-04 NOTE — TELEPHONE ENCOUNTER
Sorry to hear that he had a stroke and is in the hospital. Since Neurology is seeing him there, they would likely be better able to answer any of their current questions. They will have more ready access to all of his most recent data of his hospitalization. I would like to make sure I see him back soon after he is discharged. If they would like me to call, I can call when able, but I may not be able to fully answer all their immediate questions

## 2024-09-05 ENCOUNTER — NURSE TRIAGE (OUTPATIENT)
Age: 64
End: 2024-09-05

## 2024-09-05 ENCOUNTER — TELEPHONE (OUTPATIENT)
Age: 64
End: 2024-09-05

## 2024-09-05 NOTE — TELEPHONE ENCOUNTER
Regarding: Returning missed call/vm  ----- Message from Christina COREAS sent at 9/5/2024  2:58 PM EDT -----  Faiza patients spouse (on consent) called back; see telephone encounter 9/3/24 please. Advised someone will call her back; warm transfer not currently working. Please assist, thank you.

## 2024-09-05 NOTE — TELEPHONE ENCOUNTER
Message from Christina COREAS sent at 9/5/2024  2:58 PM EDT    Summary: Returning missed call/vm    Faiza patients spouse (on consent) called back; see telephone encounter 9/3/24 please. Advised someone will call her back; warm transfer not currently working. Please assist, thank you.              Retrieved call from triage queue. Called back and left another VM with call back #. Awaiting return call.

## 2024-09-05 NOTE — TELEPHONE ENCOUNTER
Pt wife Faiza called to advise pt is in [facility] but not yet discharged. Pt has appointment on 9/11/24 with Dr Mattson pt wife will advise if pt is still IP.

## 2024-09-06 NOTE — TELEPHONE ENCOUNTER
DR GALLARDO FROM [facility] CALLED REQUESTING TO SPEAK WITH DR MACIAS IN REGARD TO PT THAT IS CURRENTLY INPT AT [facility].    DR GALLARDO WAS HOPING TO SPEAK WITH DR MACIAS PRIOR TO PT'S DISCHARGE, IF POSSIBLE WITHIN THE NEXT 24 HOURS.    DR GALLARDO - [phone]

## 2024-09-06 NOTE — TELEPHONE ENCOUNTER
I was contacted about Mr. Zhu by his doctor at [facility]. It appears he has been admitted with seizure like activity characterized by unresponsiveness and staring spells. His medical team believes it is related to hypotension in the setting of MCA stenosis and are holding his antihypertensives. I have explained that I am unable to properly diagnose him and give recommendations without examining him or reviewing his imaging but that the reported symptoms are unlikely to be due to his MCA stenosis. Additionally, MRI reports left occipital lobe infarct and this would not be explained by his MCA stenosis either. I have recommenced a full cardiac work-up and consideration of AC. We will arrange for Neurological and Neurosurgical follow-up if he is to be discharged soon.

## 2024-09-06 NOTE — TELEPHONE ENCOUNTER
Dr mcdanile calling from [facility] regarding this pt. Pt is currently in hospital. They are looking for a call from Dr Brasher if possible @ [phone]     Also proivded attending phones number which Is Dr Trejo. Can be reached at [phone]    Also did read message from Dr Brasher on 9/4/24.

## 2024-09-06 NOTE — TELEPHONE ENCOUNTER
DR GALLARDO CB TO SEE IF A CALL WAS MISSED, BUT THIS CALL WAS DISCONNECTED AS ANOTHER CALL FROM  [facility] WAS COMING IN.

## 2024-09-10 ENCOUNTER — TELEPHONE (OUTPATIENT)
Dept: NEUROSURGERY | Facility: CLINIC | Age: 64
End: 2024-09-10

## 2024-09-10 NOTE — TELEPHONE ENCOUNTER
CALLED PATIENT TO RESCHEDULE APPOINTMENT FOR 09/11/2024 WITH [facility] TPO  CANCELLED VAS.     CALLED 3X  CALL KEPT DISCONNECTING     CANCELLED APPOINTMENT FOR 09/11/2024.

## 2024-09-12 ENCOUNTER — TELEPHONE (OUTPATIENT)
Dept: NEUROSURGERY | Facility: CLINIC | Age: 64
End: 2024-09-12

## 2024-09-12 NOTE — TELEPHONE ENCOUNTER
Attempted to contact Constanza to discuss request received from Dr. Mattson.     Left a detailed message outlining the reason for my call and requesting a call back to discuss obtaining MRI and CTA from [facility] and scheduling OV within the next 2-3 weeks.

## 2024-09-12 NOTE — TELEPHONE ENCOUNTER
----- Message from Glenn Mattson MD sent at 9/9/2024  9:57 AM EDT -----  Can we get him in to see me in the next few weeks with his imaging that he got at the OSH. I would love the MRI and CTA to look at.     Can also be a snpx.     Thanks  [facility]  ----- Message -----  From: Leonides Katz MD  Sent: 9/6/2024   8:04 PM EDT  To: Samantha Peña; Glenn Mattson MD; #    Hi Constanza Bishop is admitted in [place] with stroke and ?seizure. Please touch base with Dr. Mattson re follow-up.     Thanks,  Leonides

## 2024-09-12 NOTE — TELEPHONE ENCOUNTER
Received a call back from patient's spouse Faiza. Advised the need of the MRI and CTA from [place] be sent to the office for review. She requested we reach out to the facility for the imaging. Advised her of the need for a release of health/medical records request form to be filled out. She requested this be emailed to her at the email on file.   File emailed.     Also assisted in scheduling an appt with Dr Mattson to review imaging.     She was appreciative of the call.

## 2024-10-03 ENCOUNTER — TELEPHONE (OUTPATIENT)
Dept: NEUROSURGERY | Facility: CLINIC | Age: 64
End: 2024-10-03

## 2024-10-03 NOTE — TELEPHONE ENCOUNTER
PT'S WIFE KATJA, ON CONSENT, CALLED TO CHECK ON STATUS OF IMAGING BEING MADE AVAILABLE FROM [facility] AND TO REITERATE THAT PT DOES NOT HAVE DISC FOR APPT. EXPLAINED TO KATJA THAT EFFORTS HAVE BEEN MADE TO REACH OUT TO [facility] WITH NO SUCCESS.    KATJA REQUESTS A CB IF ISSUES OBTAINING IMAGING CONTINUE AS SHE IS NOT ABLE TO DRIVE OUT FOR A DISC BUT WILL CALL HERSELF IF THERE ARE ANY PHONE NUMBERS OR CONTACTS THAT CAN BE SHARED WITH HER TO REACH OUT TO.

## 2024-10-03 NOTE — TELEPHONE ENCOUNTER
Called patient left  . Patient had cta completed in august 2024 completed at [facility] called patient to see if he will be bringing a disk I left office number and requested a call back if patient does not have a disk this ma will request a disk or to be pushed over agin

## 2024-10-03 NOTE — TELEPHONE ENCOUNTER
Pt wife Faiza called to advise she does not have the disk, can we please request again from [facility].  Thank You

## 2024-10-07 NOTE — TELEPHONE ENCOUNTER
CALLED PT WIFE  KATJA EXPLAINED I STILL HAVE NOT GOTTEN A HOLD OF [facility] SO I FAXED A REQUEST GAVE KATJA [facility] NUMBER SHE WILL BE CALLING TO REQUEST CTA FROM 08/31/2024 AS WELL

## 2024-10-07 NOTE — TELEPHONE ENCOUNTER
3rd attempt to call [facility] in [place] to obtain cta head and neck from 08/2024. Left  [phone] will follow up later today

## 2024-10-07 NOTE — TELEPHONE ENCOUNTER
Faxed request for imaging to be pushed over from [facility] since I have not had any response from them .  fax: [phone]

## 2024-10-09 ENCOUNTER — OFFICE VISIT (OUTPATIENT)
Dept: NEUROSURGERY | Facility: CLINIC | Age: 64
End: 2024-10-09
Payer: COMMERCIAL

## 2024-10-09 ENCOUNTER — OFFICE VISIT (OUTPATIENT)
Dept: NEUROLOGY | Facility: CLINIC | Age: 64
End: 2024-10-09
Payer: COMMERCIAL

## 2024-10-09 VITALS
TEMPERATURE: 98.7 F | HEART RATE: 79 BPM | OXYGEN SATURATION: 98 % | DIASTOLIC BLOOD PRESSURE: 84 MMHG | HEIGHT: 66 IN | BODY MASS INDEX: 29.86 KG/M2 | SYSTOLIC BLOOD PRESSURE: 138 MMHG | RESPIRATION RATE: 17 BRPM

## 2024-10-09 VITALS — HEART RATE: 71 BPM | HEIGHT: 66 IN | TEMPERATURE: 97.6 F | OXYGEN SATURATION: 100 % | BODY MASS INDEX: 29.86 KG/M2

## 2024-10-09 DIAGNOSIS — G93.89 CEREBRAL VENTRICULOMEGALY: ICD-10-CM

## 2024-10-09 DIAGNOSIS — R06.83 SNORING: ICD-10-CM

## 2024-10-09 DIAGNOSIS — I63.9 RECURRENT STROKES (HCC): ICD-10-CM

## 2024-10-09 DIAGNOSIS — Z86.73 HISTORY OF STROKE: Primary | ICD-10-CM

## 2024-10-09 DIAGNOSIS — I65.23 CAROTID STENOSIS, BILATERAL: Primary | ICD-10-CM

## 2024-10-09 PROCEDURE — 99215 OFFICE O/P EST HI 40 MIN: CPT | Performed by: NURSE PRACTITIONER

## 2024-10-09 PROCEDURE — 99215 OFFICE O/P EST HI 40 MIN: CPT | Performed by: PSYCHIATRY & NEUROLOGY

## 2024-10-09 RX ORDER — ASPIRIN 81 MG/1
81 TABLET, CHEWABLE ORAL DAILY
COMMUNITY

## 2024-10-09 RX ORDER — AMANTADINE HYDROCHLORIDE 100 MG/1
100 TABLET ORAL 2 TIMES DAILY
COMMUNITY

## 2024-10-09 RX ORDER — MIRTAZAPINE 7.5 MG/1
7.5 TABLET, FILM COATED ORAL
COMMUNITY

## 2024-10-09 NOTE — PROGRESS NOTES
Ambulatory Visit  Name: Constanza Zhu      : 1960      MRN: [id number]  Encounter Provider: Glenn Mattson MD  Encounter Date: 10/9/2024   Encounter department: [facility] NEUROS[facility]    Assessment & Plan  Carotid stenosis, bilateral  As addressed in HPI  Presently treating with DAPT Brilinta aspirin  , intracranial atherosclerosis   Treating with atorvastatin and antihypertensive agents  Seizures.He remains on 1000 mg twice daily of Keppra.  2024 CTA neck: ---Aortic arch in field-of-view is patent. Origin of vessels from aortic arch are patent. Left common carotid artery is patent. There appears to be a stent left common/internal carotid artery that is patent. The left internal carotid artery above the stent is patent. Right common carotid artery and internal carotid artery are patent. There is a stent spanning the right carotid bulb which is patent. Bilateral vertebral arteries are patent.   Wife reports today his memory has significantly improved as well as his level of activity and endurance.   He had a recent stroke , was admitted to [facility]    He was discharged to [facility], Wife reports continued OP therapy . Wife reports he is working on a tablet  Next neurology appointment 10/9/2024       Diagnostics /Imagining   2024 CTA Head and neck w/wo --- Vascular stents spanning the bilateral carotid bulbs, which are patent. No significant stenosis identified in the neck. 1. Focal high-grade stenosis right M1/M2 region with smaller caliber peripheral right MCA vessels compared to the left. 2. Multifocal moderate stenoses left M2/M3 branches     Plan  Reviewed imagining .  RTO in 1 year Caroid VAS complete  due 2025 , RTO as joint appointment with Dr. Mattson and I.  Reinforced ongoing need for appointments with neurology for stroke/secondry prevention .  Explained need to continue DAPT Brilinta aspirin bilateral DARRELL and intracranial atherosclerosis.   Reviewed red  flag signs for abnormal bleeding and stroke if occur report to closest ED for assessment .  Advised if additional questions or concerns contact the office.       Orders:    VAS carotid complete study; Future    Cerebral ventriculomegaly  Concern for normal pressure hydrocephalus.  Will cont to monitor.  His wife states his memory and ambulation have significantly improved after stenting.    9/1/2024 -MRI Brain wo -Moderate to advanced generalized cortical atrophy. There is ventricular dilation which is greater than/out of proportion to the degree of cortical atrophy and can be seen in the setting of normal pressure hydrocephalus.              History of Present Illness   He presents for f/u appoint with VAS carotid complete study for review, last OV 6/3/2024 status post right DARRELL May 2024     He initially presented in 2019 requiring a right MCA thrombectomy and had significant improvement. he represented with a new left MCA stroke and significant bilateral carotid stenosis.  He underwent a left carotid angioplasty and stenting which was without complication.   He had a recurrent small stroke while off  triple APT, This was in proximity to his COVID-vaccine. He resumed TAPT(ASA, Brilinta, and Pletal)   Asymptomatic right carotid stenosis, status post DARRELL 5/1/2024 He did have some underlying right MCA stenosis likely associated with his prior thrombectomy. He has a history of recurrent strokes   Symptomatic left carotid stenosis status post angioplasty and stenting 5/4/2023    Prior history of right MCA thrombectomy with evidence of intracranial stenosis   Brilinta aspirin dual antiplatelet therapy for a total of 3 months.     8/31/2024  Admitted to [facility] with  mental status changes, slower to respond than normal MRI demonstrated stroke   9/1/2024 MRI Brain wo Punctate foci of restricted diffusion within the left occipital region, reflecting acute-subacute infarct.   Reviewed by Dr Mattson , areas of  infract do not correlated with areas of intracranial atherosclerosis . No endovascular interventio indicated.     Social  -- , lives with his wife. Has a son age 40 and daughter age 39.  He stopped smoking after his first stroke   No alcohol dependence.  Stroke family history of stroke  3 maternal unlkles and father. Father was adopted , his family history is unk.         HPI  Review of Systems   Eyes:  Positive for visual disturbance (preripheral improving).   Gastrointestinal:  Positive for constipation, nausea and vomiting.   Genitourinary:         Overnight rare urine incontinence    Musculoskeletal:  Positive for gait problem (short distances with walker improving).   Neurological:  Positive for speech difficulty (improving goes to speech therapy) and weakness (generalized weaknees improving  physical therapy and occupational therapy). Negative for dizziness and headaches.   Hematological:  Bruises/bleeds easily (medication).   Psychiatric/Behavioral:  Positive for confusion (memory loss improving).      I have personally reviewed the MA's review of systems and made changes as necessary.    Pertinent Medical History         Medical History Reviewed by provider this encounter:       Past Medical History   Past Medical History:   Diagnosis Date    Depression     Diabetes mellitus (HCC)     patient denies    Dyslipidemia 03/26/2019    GERD (gastroesophageal reflux disease)     Hyperlipidemia     Hypertension     Prediabetes     Prediabetes 04/03/2019    Stroke (HCC)      Past Surgical History:   Procedure Laterality Date    ARTHROSCOPIC REPAIR ACL Left     BACK SURGERY      twice    CARPAL TUNNEL RELEASE Right     IR CEREBRAL ANGIOGRAPHY  05/04/2023    IR STROKE ALERT  03/19/2019    SHOULDER SURGERY Left     US GUIDED THYROID BIOPSY  11/21/2023     Family History   Problem Relation Age of Onset    Hyperlipidemia Mother     Hypertension Mother     Diabetes unspecified Mother         prediabetes    Heart  attack Mother     Diabetes Mother     Hyperlipidemia Father     Hypertension Father     Prostate cancer Father     Stroke Father     Hyperlipidemia Sister     Hypertension Sister     Hyperlipidemia Sister     Hypertension Sister     Depression Sister     Hypertension Sister     Hyperlipidemia Sister     Depression Sister     Hyperlipidemia Brother     Hypertension Brother     Hypertension Brother     Prostate cancer Brother     Hypothyroidism Daughter     Hypothyroidism Son     Diabetes unspecified Son     Seizures Neg Hx      Current Outpatient Medications on File Prior to Visit   Medication Sig Dispense Refill    aspirin (ECOTRIN LOW STRENGTH) 81 mg EC tablet Take 1 tablet (81 mg total) by mouth daily Do not start before April 19, 2023. 30 tablet 0    ticagrelor (BRILINTA) 90 MG Take 1 tablet (90 mg total) by mouth every 12 (twelve) hours 60 tablet 3    acetaminophen (TYLENOL) 500 mg tablet Take 500 mg by mouth as needed for mild pain      albuterol (PROVENTIL HFA,VENTOLIN HFA) 90 mcg/act inhaler  (Patient not taking: Reported on 6/18/2024)      Amantadine HCl 100 MG tablet Take 100 mg by mouth 2 (two) times a day      aspirin 81 mg chewable tablet Chew 81 mg daily      atorvastatin (LIPITOR) 40 mg tablet Take 40 mg by mouth daily with breakfast      baclofen 5 MG TABS Take 5 mg by mouth 2 (two) times a day as needed for muscle spasms  0    cilostazol (PLETAL) 100 mg tablet Take 1 tablet (100 mg total) by mouth 2 (two) times a day before meals 60 tablet 0    donepezil (ARICEPT) 10 mg tablet Take 1 tablet (10 mg total) by mouth in the morning 60 tablet 2    hydroCHLOROthiazide 12.5 mg capsule Take 12.5 mg by mouth every morning (Patient not taking: Reported on 10/9/2024)      levETIRAcetam (Keppra) 500 mg tablet Take 1 tab (500 mg) twice a day for 1 week, then take 2 tabs (1000 mg) twice a day. 360 tablet 3    lidocaine (LIDODERM) 5 % Apply 1 patch topically daily Remove & Discard patch within 12 hours or as  directed by MD      losartan (COZAAR) 50 mg tablet Take 50 mg by mouth daily (Patient not taking: Reported on 10/9/2024)      melatonin 1 mg Take 5 mg by mouth daily at bedtime      metoprolol succinate (TOPROL-XL) 100 mg 24 hr tablet Take 50 mg by mouth daily      mirtazapine (REMERON) 7.5 MG tablet Take 7.5 mg by mouth daily at bedtime      pantoprazole (PROTONIX) 40 mg tablet Take 1 tablet (40 mg total) by mouth daily (Patient taking differently: Take 20 mg by mouth daily) 90 tablet 0    tamsulosin (FLOMAX) 0.4 mg Take 1 capsule (0.4 mg total) by mouth daily with dinner 90 capsule 3    traZODone (DESYREL) 100 mg tablet Take 100 mg by mouth daily at bedtime       No current facility-administered medications on file prior to visit.     Allergies   Allergen Reactions    Flexeril [Cyclobenzaprine] Drowsiness    Lisinopril Cough    Nsaids Confusion      Current Outpatient Medications on File Prior to Visit   Medication Sig Dispense Refill    aspirin (ECOTRIN LOW STRENGTH) 81 mg EC tablet Take 1 tablet (81 mg total) by mouth daily Do not start before April 19, 2023. 30 tablet 0    ticagrelor (BRILINTA) 90 MG Take 1 tablet (90 mg total) by mouth every 12 (twelve) hours 60 tablet 3    acetaminophen (TYLENOL) 500 mg tablet Take 500 mg by mouth as needed for mild pain      albuterol (PROVENTIL HFA,VENTOLIN HFA) 90 mcg/act inhaler  (Patient not taking: Reported on 6/18/2024)      Amantadine HCl 100 MG tablet Take 100 mg by mouth 2 (two) times a day      aspirin 81 mg chewable tablet Chew 81 mg daily      atorvastatin (LIPITOR) 40 mg tablet Take 40 mg by mouth daily with breakfast      baclofen 5 MG TABS Take 5 mg by mouth 2 (two) times a day as needed for muscle spasms  0    cilostazol (PLETAL) 100 mg tablet Take 1 tablet (100 mg total) by mouth 2 (two) times a day before meals 60 tablet 0    donepezil (ARICEPT) 10 mg tablet Take 1 tablet (10 mg total) by mouth in the morning 60 tablet 2    hydroCHLOROthiazide 12.5 mg  "capsule Take 12.5 mg by mouth every morning (Patient not taking: Reported on 10/9/2024)      levETIRAcetam (Keppra) 500 mg tablet Take 1 tab (500 mg) twice a day for 1 week, then take 2 tabs (1000 mg) twice a day. 360 tablet 3    lidocaine (LIDODERM) 5 % Apply 1 patch topically daily Remove & Discard patch within 12 hours or as directed by MD      losartan (COZAAR) 50 mg tablet Take 50 mg by mouth daily (Patient not taking: Reported on 10/9/2024)      melatonin 1 mg Take 5 mg by mouth daily at bedtime      metoprolol succinate (TOPROL-XL) 100 mg 24 hr tablet Take 50 mg by mouth daily      mirtazapine (REMERON) 7.5 MG tablet Take 7.5 mg by mouth daily at bedtime      pantoprazole (PROTONIX) 40 mg tablet Take 1 tablet (40 mg total) by mouth daily (Patient taking differently: Take 20 mg by mouth daily) 90 tablet 0    tamsulosin (FLOMAX) 0.4 mg Take 1 capsule (0.4 mg total) by mouth daily with dinner 90 capsule 3    traZODone (DESYREL) 100 mg tablet Take 100 mg by mouth daily at bedtime       No current facility-administered medications on file prior to visit.      Social History     Tobacco Use    Smoking status: Former    Smokeless tobacco: Never   Vaping Use    Vaping status: Never Used   Substance and Sexual Activity    Alcohol use: Never    Drug use: Never    Sexual activity: Not Currently     Objective     /84 (BP Location: Right arm, Patient Position: Sitting, Cuff Size: Standard)   Pulse 79   Temp 98.7 °F (37.1 °C) (Temporal)   Resp 17   Ht 5' 6\" (1.676 m)   SpO2 98%   BMI 29.86 kg/m²     Physical Exam  Vitals and nursing note reviewed. Exam conducted with a chaperone present.   Constitutional:       Appearance: He is not ill-appearing.   Pulmonary:      Effort: Pulmonary effort is normal. No respiratory distress.   Musculoskeletal:      Right lower leg: No edema.      Left lower leg: No edema.   Neurological:      Mental Status: He is alert.      Motor: Weakness present.      Gait: Gait abnormal. "       Neurologic Exam     Mental Status   Disoriented to person.   Oriented to country.   Oriented to year, month, date and season.   Attention: normal.   Speech: (Slight dysarthric .)  Level of consciousness: alert  Expressive and receptive aphasia      Motor Exam     Strength   Right deltoid: 4/5  Left deltoid: 4/5  Right biceps: 4/5  Left biceps: 4/5  Right triceps: 4/5  Left triceps: 4/5  Right wrist flexion: 4/5  Left wrist flexion: 4/5  Right wrist extension: 4/5  Left wrist extension: 4/5  Right interossei: 4/5  Left interossei: 4/5  Right iliopsoas: 4/5  Left iliopsoas: 4/5  Right quadriceps: 4/5  Left quadriceps: 4/5  Right hamstrin/5  Left hamstrin/5  Right glutei: 4/5  Left glutei: 4/5  Right anterior tibial: 4/5  Left anterior tibial: 4/5  Right posterior tibial: 4/5  Left posterior tibial: 4/5  Right gastroc: 4/5  Left gastroc: 4/5Ddelayed following direction , repeat directions      Sensory Exam   Light touch normal.     Gait, Coordination, and Reflexes     Gait  Gait: (Wheel chair)      I have reviewed radiology reports from EPIC/PACS including: CT CTA H/N.    Administrative Statements   I have spent a total time of 45 minutes in caring for this patient on the day of the visit/encounter including Diagnostic results, Risks and benefits of tx options, Instructions for management, Patient and family education, Importance of tx compliance, Risk factor reductions, Impressions, Counseling / Coordination of care, Documenting in the medical record, Reviewing / ordering tests, medicine, procedures  , Obtaining or reviewing history  , and Communicating with other healthcare professionals .

## 2024-10-09 NOTE — TELEPHONE ENCOUNTER
Called [facility] to have the mri from 09/01/2024 of the brain be pushed over stat also faxed a request aswell

## 2024-10-09 NOTE — ASSESSMENT & PLAN NOTE
-for secondary stroke prevention, recommend continuation of combination of aspirin, Brilinta and atorvastatin.  Pletal has been discontinued.  -Blood Pressure goal < 130/80, BP is at goal.  -LDL goal <70  -snoring -we will refer him to sleep medicine.  -He was found to have abnormal thrombosis panel in the past, refer him to hematology oncology.  -continue with OT, PT, ST and vision therapy 2 times a week  -needs further cardiac workup with zio patch/loop recorder    Counseling/stroke education -   -I advised patient to avoid using NSAIDs for headaches or other pain and to stick to tylenol if needed  -Recommend lifestyle modifications such as mediterranean diet & regular exercise regimen atleast 4-5 times a week for 20-30 minutes.   -I educated patient/family regarding medication compliance  -encourage smoking cessation, and control of diabetes and hypertension; defer management to primary   Orders:    Ambulatory Referral to Hematology / Oncology; Future

## 2024-10-09 NOTE — ASSESSMENT & PLAN NOTE
As addressed in HPI  Presently treating with DAPT Brilinta aspirin  , intracranial atherosclerosis   Treating with atorvastatin and antihypertensive agents  Seizures.He remains on 1000 mg twice daily of Keppra.  8/31/2024 CTA neck: ---Aortic arch in field-of-view is patent. Origin of vessels from aortic arch are patent. Left common carotid artery is patent. There appears to be a stent left common/internal carotid artery that is patent. The left internal carotid artery above the stent is patent. Right common carotid artery and internal carotid artery are patent. There is a stent spanning the right carotid bulb which is patent. Bilateral vertebral arteries are patent.   Wife reports today his memory has significantly improved as well as his level of activity and endurance.   He had a recent stroke , was admitted to [facility]    He was discharged to [facility], Wife reports continued OP therapy . Wife reports he is working on a tablet  Next neurology appointment 10/9/2024       Diagnostics /Imagining   8/31/2024 CTA Head and neck w/wo --- Vascular stents spanning the bilateral carotid bulbs, which are patent. No significant stenosis identified in the neck. 1. Focal high-grade stenosis right M1/M2 region with smaller caliber peripheral right MCA vessels compared to the left. 2. Multifocal moderate stenoses left M2/M3 branches     Plan  Reviewed imagining .  RTO in 1 year Caroid VAS complete  due 8/31/2025 , RTO as joint appointment with Dr. Mattson and I.  Reinforced ongoing need for appointments with neurology for stroke/secondry prevention .  Explained need to continue DAPT Brilinta aspirin bilateral DARRELL and intracranial atherosclerosis.   Reviewed red flag signs for abnormal bleeding and stroke if occur report to closest ED for assessment .  Advised if additional questions or concerns contact the office.       Orders:    VAS carotid complete study; Future

## 2024-10-09 NOTE — PROGRESS NOTES
Ambulatory Visit  Name: Constanza Zhu      : 1960      MRN: [id number]  Encounter Provider: Maddie Steele MD  Encounter Date: 10/9/2024   Encounter department: [facility] [place]    This is a 63 y/o Male who is here as a follow up for history of multiple strokes in various vascular distributions.,  Majority of them do seem to be watershed in the left MCA/ PCA distribution.  He does have left MCA stenosis on CTA head and neck.  He also has right MCA stenosis which is high-grade.  This is likely the etiology of his multiple strokes and he should remain on aspirin and Brilinta indefinitely.  His goal blood pressure should also be 140/90 to allow for perfusion. Etiology of his strokes are likely related to intracranial stenosis (both right and left MCA stenosis)  Assessment & Plan  History of stroke  -for secondary stroke prevention, recommend continuation of combination of aspirin, Brilinta and atorvastatin.  Pletal has been discontinued.  -Blood Pressure goal < 130/80, BP is at goal.  -LDL goal <70  -snoring -we will refer him to sleep medicine.  -He was found to have abnormal thrombosis panel in the past, refer him to hematology oncology.  -continue with OT, PT, ST and vision therapy 2 times a week  -needs further cardiac workup with zio patch/loop recorder    Counseling/stroke education -   -I advised patient to avoid using NSAIDs for headaches or other pain and to stick to tylenol if needed  -Recommend lifestyle modifications such as mediterranean diet & regular exercise regimen atleast 4-5 times a week for 20-30 minutes.   -I educated patient/family regarding medication compliance  -encourage smoking cessation, and control of diabetes and hypertension; defer management to primary   Orders:    Ambulatory Referral to Hematology / Oncology; Future    Recurrent strokes (HCC)         Snoring    Orders:    Ambulatory Referral to Sleep Medicine; Future    Follow up in 2 months.    I  would be happy to see the patient sooner if any new questions/concerns arise.  Patient/Guardian was advised to the call the office if they have any questions and concerns in the meantime.     Patient/Guardian does understand that if they have any new stroke like symptoms such as facial droop on one side, weakness/paralysis on either side, speech trouble, numbness on one side, balance issues, any vision changes, extreme dizziness or any new headache, to call 9-1-1 immediately or to proceed to the nearest ER immediately.        History of Present Illness   HPI    This is a 63 y/o M w/ history of bilateral carotid artery stenosis with bilateral MCA strokes, right MCA thrombectomy, and left ICA stenting.     Patient was seen at [facility], and I reviewed the records in the care everywhere. He had a stroke in the L occipital stroke, and patient was getting ready to be discharged, and patient had an episode of staring, passing out spells, and it was thought to be related to hypoperfusion.     Patient is maintained on aspirin, Brilinta 90mg PO BID, and atorvastatin for stroke prevention.  Pletal was discontinued.    He went to [facility], and he was told that he had mild peripheral vision loss and is going to start vision therapy and he is able to play the game, and wife is really pleased with his progress at [facility].  He is doing much much better.    Review of Systems   Constitutional: Negative.    HENT: Negative.     Eyes: Negative.    Respiratory: Negative.     Cardiovascular: Negative.    Gastrointestinal: Negative.    Endocrine: Negative.    Genitourinary: Negative.    Musculoskeletal: Negative.    Skin: Negative.    Allergic/Immunologic: Negative.    Neurological: Negative.    Hematological: Negative.    Psychiatric/Behavioral: Negative.     All other systems reviewed and are negative.    I have personally reviewed the MA's review of systems and made changes as  "necessary.      Objective     Pulse 71   Temp 97.6 °F (36.4 °C) (Temporal)   Ht 5' 6\" (1.676 m)   SpO2 100%   BMI 29.86 kg/m²     Physical Exam  General - patient is alert   Speech - no dysarthria noted, no aphasia noted.     Neuro:   Cranial nerves: PERRL, EOMI, facial sensation intact to soft touch in V1, V2 and V3, no facial asymmetry noted, uvula/palate midline, tongue midline.   Motor: 5/5 throughout, normal tone, no pronator drift noted.   Sensory - intact to soft touch throughout  Reflexes - 2+ throughout  Coordination - no ataxia/dysmetria noted  Gait - normal      Results/Data:  I have reviewed the results and images from the in detail with the patient.        Administrative Statements   I have spent a total time of 40 minutes in caring for this patient on the day of the visit/encounter including Prognosis, Risks and benefits of tx options, Instructions for management, Patient and family education, Counseling / Coordination of care, Documenting in the medical record, Reviewing / ordering tests, medicine, procedures  , Obtaining or reviewing history  , and Communicating with other healthcare professionals .  "

## 2024-10-10 PROBLEM — G93.89 CEREBRAL VENTRICULOMEGALY: Status: ACTIVE | Noted: 2024-10-10

## 2024-10-11 NOTE — ASSESSMENT & PLAN NOTE
Concern for normal pressure hydrocephalus.  Will cont to monitor.  His wife states his memory and ambulation have significantly improved after stenting.    9/1/2024 -MRI Brain wo -Moderate to advanced generalized cortical atrophy. There is ventricular dilation which is greater than/out of proportion to the degree of cortical atrophy and can be seen in the setting of normal pressure hydrocephalus.

## 2024-11-07 ENCOUNTER — OFFICE VISIT (OUTPATIENT)
Dept: CARDIOLOGY CLINIC | Facility: CLINIC | Age: 64
End: 2024-11-07
Payer: COMMERCIAL

## 2024-11-07 VITALS
WEIGHT: 201 LBS | HEART RATE: 71 BPM | DIASTOLIC BLOOD PRESSURE: 68 MMHG | BODY MASS INDEX: 32.3 KG/M2 | SYSTOLIC BLOOD PRESSURE: 132 MMHG | HEIGHT: 66 IN

## 2024-11-07 DIAGNOSIS — I71.43 INFRARENAL ABDOMINAL AORTIC ANEURYSM (AAA) WITHOUT RUPTURE (HCC): Primary | ICD-10-CM

## 2024-11-07 DIAGNOSIS — I10 PRIMARY HYPERTENSION: ICD-10-CM

## 2024-11-07 DIAGNOSIS — I77.1 ILIAC ARTERY STENOSIS, LEFT (HCC): ICD-10-CM

## 2024-11-07 PROCEDURE — 99214 OFFICE O/P EST MOD 30 MIN: CPT | Performed by: INTERNAL MEDICINE

## 2024-11-07 RX ORDER — METOPROLOL SUCCINATE 50 MG/1
50 TABLET, EXTENDED RELEASE ORAL DAILY
COMMUNITY

## 2024-11-07 NOTE — PROGRESS NOTES
Cardiology Outpatient Follow-Up Note - Constanza Zhu 64 y.o. male MRN: [id number]      Assessment/Plan:  1. Infrarenal abdominal aortic aneurysm (AAA) without rupture (HCC)    2. Iliac artery stenosis, left (HCC)    3. Primary hypertension    Reviewed BP log, his BP is frequently > 150 SBP.   Offered to start olmesartan 5 mg daily , patient's wife would like med changes first reviewed with neurology. This is understandable. Message sent to his neurologist regarding this.     For AAA, continue follow-up with vascular surgery and continue metoprolol XL 50 mg daily.   Continue atorvastatin 40 mg daily       We will see Constanza Zhu back in 12 months for routine follow-up.    Subjective:     HPI: Constanza Zhu is a 64 y.o. year old male with L DARRELL s/p PCI May 2023, history of MCA CVA in 2023 and recurrent CVA in Sept 2024, loop recorder implant in stroke w/u which reached end of service, presenting for follow-up.           Cardiac Testing:      Echo 4/16/24  Interpretation Summary  Show Result Comparison     Left Ventricle: Left ventricular cavity size is normal. Wall thickness is mildly increased. The left ventricular ejection fraction is 65%. Systolic function is normal. Wall motion is normal. Diastolic function is mildly abnormal, consistent with grade I (abnormal) relaxation.    Right Ventricle: Right ventricular cavity size is normal. Systolic function is normal.    Left Atrium: The atrium is moderately dilated.    Aortic Valve: There is mild regurgitation. There is mild stenosis.     VAS  abdominal aorta 7/3/24   CONCLUSION:  Impression  There is an infrarenal fusiform abdominal aortic aneurysm measuring 3.4cm  (prior 3.4cm on CT scan)  The common iliac arteries are patent.  The left proximal common iliac artery is ectatic measuring 1.8cm.  There is a 50-75% stenosis within the right distal external iliac artery.  The superior mesenteric artery is patent.  The celiac and renal arteries were not visualized.    "  There is no previous study for comparison.  Recommend repeat testing in 1 year as per protocol unless otherwise indicated.     EKGs, personally reviewed:  N/a    Relevant Labs & Results:  BMP 9/10/24 - K 3.8, Cr 0.88  Lipid panel 4/14/24 - TC 90, , HDL 26, LDL 40 mg/dL     ROS:  Review of Systems:  Review of Systems    Objective:     Vitals:   Vitals:    11/07/24 1558   BP: 132/68   BP Location: Left arm   Patient Position: Sitting   Cuff Size: Standard   Pulse: 71   Weight: 91.2 kg (201 lb)   Height: 5' 6\" (1.676 m)    Body surface area is 2 meters squared.  Wt Readings from Last 3 Encounters:   11/07/24 91.2 kg (201 lb)   07/19/24 83.9 kg (185 lb)   06/03/24 88.5 kg (195 lb)       Physical Exam:  General: Constanza Zhu is a chronically ill appearing male with a rollator walker  HEENT: moist mucous membranes, EOMI  Neck:  No JVD, supple, trachea midline  Cardiovascular: unremarkable S1/S2, regular rate and rhythm, no murmurs, rubs or gallops  Pulmonary: normal respiratory effort, CTAB  Abdomen: nondistended  Extremities: No lower extremity edema. Warm and well perfused extremities.  Neuro: deferred  Psych: deferred      Medications (at the START of this encounter):  Outpatient Medications Prior to Visit   Medication Sig Dispense Refill    acetaminophen (TYLENOL) 500 mg tablet Take 500 mg by mouth as needed for mild pain      Amantadine HCl 100 MG tablet Take 100 mg by mouth 2 (two) times a day      aspirin 81 mg chewable tablet Chew 81 mg daily      atorvastatin (LIPITOR) 40 mg tablet Take 40 mg by mouth daily with breakfast      baclofen 5 MG TABS Take 5 mg by mouth 2 (two) times a day as needed for muscle spasms  0    donepezil (ARICEPT) 10 mg tablet Take 1 tablet (10 mg total) by mouth in the morning 60 tablet 2    lidocaine (LIDODERM) 5 % Apply 1 patch topically daily Remove & Discard patch within 12 hours or as directed by MD      metoprolol succinate (TOPROL-XL) 100 mg 24 hr tablet Take 50 mg by " "mouth daily Taking 50 MG daily      mirtazapine (REMERON) 7.5 MG tablet Take 7.5 mg by mouth daily at bedtime      pantoprazole (PROTONIX) 40 mg tablet Take 1 tablet (40 mg total) by mouth daily (Patient taking differently: Take 20 mg by mouth daily) 90 tablet 0    tamsulosin (FLOMAX) 0.4 mg Take 1 capsule (0.4 mg total) by mouth daily with dinner 90 capsule 3    ticagrelor (BRILINTA) 90 MG Take 1 tablet (90 mg total) by mouth every 12 (twelve) hours 60 tablet 3    albuterol (PROVENTIL HFA,VENTOLIN HFA) 90 mcg/act inhaler  (Patient not taking: Reported on 6/18/2024)      levETIRAcetam (Keppra) 500 mg tablet Take 1 tab (500 mg) twice a day for 1 week, then take 2 tabs (1000 mg) twice a day. (Patient not taking: Reported on 11/7/2024) 360 tablet 3    losartan (COZAAR) 50 mg tablet Take 50 mg by mouth daily (Patient not taking: Reported on 10/9/2024)      melatonin 1 mg Take 5 mg by mouth daily at bedtime      aspirin (ECOTRIN LOW STRENGTH) 81 mg EC tablet Take 1 tablet (81 mg total) by mouth daily Do not start before April 19, 2023. 30 tablet 0    cilostazol (PLETAL) 100 mg tablet Take 1 tablet (100 mg total) by mouth 2 (two) times a day before meals 60 tablet 0    hydroCHLOROthiazide 12.5 mg capsule Take 12.5 mg by mouth every morning (Patient not taking: Reported on 10/9/2024)      traZODone (DESYREL) 100 mg tablet Take 100 mg by mouth daily at bedtime       No facility-administered medications prior to visit.                 This note was completed in part utilizing Dragon [employer] One voice recognition software. Grammatical errors, random word insertion, spelling mistakes, occasional wrong word or \"sound-alike\" substitutions and incomplete sentences may be an occasional consequence of the system secondary to software limitations, ambient noise and hardware issues. At the time of dictation, efforts were made to edit, clarify and /or correct errors.  Please read the chart carefully and recognize, using context, where " substitutions have occurred.  If you have any questions or concerns about the context, text or information contained within the body of this dictation, please contact myself, the provider, for further clarification.

## 2024-11-11 ENCOUNTER — CONSULT (OUTPATIENT)
Age: 64
End: 2024-11-11
Payer: COMMERCIAL

## 2024-11-11 VITALS
SYSTOLIC BLOOD PRESSURE: 150 MMHG | BODY MASS INDEX: 32.44 KG/M2 | DIASTOLIC BLOOD PRESSURE: 82 MMHG | OXYGEN SATURATION: 98 % | RESPIRATION RATE: 18 BRPM | HEIGHT: 66 IN | HEART RATE: 78 BPM | TEMPERATURE: 98.5 F

## 2024-11-11 DIAGNOSIS — Z86.73 HISTORY OF STROKE: ICD-10-CM

## 2024-11-11 DIAGNOSIS — Z12.12 SCREENING FOR COLORECTAL CANCER: Primary | ICD-10-CM

## 2024-11-11 DIAGNOSIS — Z12.11 SCREENING FOR COLORECTAL CANCER: Primary | ICD-10-CM

## 2024-11-11 PROCEDURE — 99203 OFFICE O/P NEW LOW 30 MIN: CPT | Performed by: INTERNAL MEDICINE

## 2024-11-11 RX ORDER — TRAZODONE HYDROCHLORIDE 50 MG/1
TABLET, FILM COATED ORAL
COMMUNITY
Start: 2024-09-12

## 2024-11-11 NOTE — PROGRESS NOTES
[facility]  [address]   FIRST FLOOR  [place] [address]  [phone]     Date of Visit: 11/11/2024  Name: Constanza Zhu   YOB: 1960         Assessment/Plan  In summary, Constanza Zhu is a 64 y.o. male with multiple recurrent strokes over the last few years, significant bilateral carotid stenosis requiring right carotid artery stenting in May 2024.  From a hematology standpoint, his CBC is not suggestive of any myeloproliferative neoplasm such as ET or PCV. Thrombosis panel has been normal except for prior abnormal levels in antithrombin III activity, which was repeated in sept 2024 and is now normal. CT c/a/p were done last year to r/o underlying malignancies and these have been negative. He never had a colonoscopy and a FIT test was ordered today to r/o occult colon primary. For now, there are no obvious hematological/oncologic causes for the strokes on my review.     Thank you for the consultation.  Please do not hesitate to reach out with any questions/concerns    Return if symptoms worsen or fail to improve.       HISTORY OF PRESENT ILLNESS:   Constanza Zhu is a 64 y.o. male with a past medical history of multiple strokes, hypertension, GERD, BPH, anxiety/depression.        History of b/l carotid artery stenosis with bilateral MCA strokes, right MCA thrombectomy 2019, left MCA thrombectomy and left ICA stenting 5/2023 with Dr. Mattson. He had recurrent stroke symptoms with small acute left sided stroke in April 2024. He was noted to have progression in right carotid disease with patent left ICA stent on CTA though no new stroke on the right. He underwent right carotid stenting in May 2024.    Per neurology, his recurrent strokes are most likely secondary to the high-grade right MCA stenosis.  Patient remains on aspirin and Brilinta.  He has now been referred to hematology for an abnormal thrombosis panel in the  "past.    March 2019-normal protein CNS activity, lupus anticoagulant negative, Antithrombin III activity.  Low at 74%.  No prothrombin gene mutation, no factor V Leyden mutation, negative beta-2 glycoprotein antibodies and cardiolipin antibodies.    April 2023-normal protein C and S activity, no factor V Leyden mutation, prothrombin gene mutation not detected, beta-2 glycoprotein antibodies are negative.  Cardiolipin antibodies are negative.  Lupus anticoagulant not detected.  Antithrombin III activity low at 89%.        September 2024-normal Antithrombin III activity    Latest stroke was in Sept 2024.   Here to discuss possible underlying hematological disorders.      Subjective  He has multiple symptoms from the recurrent strokes including visual disturbances such as floaters, gait problem, for which she uses a walker, he had difficulty with speech, and poor memory.  His performance status is limited.    He has significant family history of stroke.  Denies any history of VTE.    REVIEW OF SYSTEMS:  14 point review of systems otherwise  negative      Physical Exam    Blood pressure 150/82, pulse 78, temperature 98.5 °F (36.9 °C), temperature source Temporal, resp. rate 18, height 5' 6\" (1.676 m), SpO2 98%.     General appearance: Not in acute distress, well appearing and well nourished.  walks with a walker. He has some aphasia.   Defers answers to his wife.      Current Outpatient Medications:     acetaminophen (TYLENOL) 500 mg tablet, Take 500 mg by mouth as needed for mild pain, Disp: , Rfl:     Amantadine HCl 100 MG tablet, Take 100 mg by mouth 2 (two) times a day, Disp: , Rfl:     aspirin 81 mg chewable tablet, Chew 81 mg daily, Disp: , Rfl:     atorvastatin (LIPITOR) 40 mg tablet, Take 40 mg by mouth daily with breakfast, Disp: , Rfl:     baclofen 5 MG TABS, Take 5 mg by mouth 2 (two) times a day as needed for muscle spasms, Disp: , Rfl: 0    donepezil (ARICEPT) 10 mg tablet, Take 1 tablet (10 mg total) by " mouth in the morning, Disp: 60 tablet, Rfl: 2    levETIRAcetam (Keppra) 500 mg tablet, Take 1 tab (500 mg) twice a day for 1 week, then take 2 tabs (1000 mg) twice a day., Disp: 360 tablet, Rfl: 3    lidocaine (LIDODERM) 5 %, Apply 1 patch topically daily Remove & Discard patch within 12 hours or as directed by MD, Disp: , Rfl:     melatonin 1 mg, Take 5 mg by mouth daily at bedtime, Disp: , Rfl:     metoprolol succinate (TOPROL-XL) 50 mg 24 hr tablet, Take 50 mg by mouth daily, Disp: , Rfl:     mirtazapine (REMERON) 7.5 MG tablet, Take 7.5 mg by mouth daily at bedtime, Disp: , Rfl:     pantoprazole (PROTONIX) 40 mg tablet, Take 1 tablet (40 mg total) by mouth daily (Patient taking differently: Take 20 mg by mouth daily), Disp: 90 tablet, Rfl: 0    tamsulosin (FLOMAX) 0.4 mg, Take 1 capsule (0.4 mg total) by mouth daily with dinner, Disp: 90 capsule, Rfl: 3    ticagrelor (BRILINTA) 90 MG, Take 1 tablet (90 mg total) by mouth every 12 (twelve) hours, Disp: 60 tablet, Rfl: 3    traZODone (DESYREL) 50 mg tablet, 1 tablet Orally nightly for 90 days, Disp: , Rfl:     albuterol (PROVENTIL HFA,VENTOLIN HFA) 90 mcg/act inhaler, , Disp: , Rfl:      Allergies   Allergen Reactions    Flexeril [Cyclobenzaprine] Drowsiness    Lisinopril Cough    Nsaids Confusion        ACTIVE PROBLEMS:  Patient Active Problem List   Diagnosis    History of stroke    Headache    Primary hypertension    GERD (gastroesophageal reflux disease)    At risk for venous thromboembolism (VTE)    Hypertriglyceridemia    Nicotine dependence    Left carotid stenosis    Presbyopia    Urinary retention    Fall    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)    Anxiety and depression    Insomnia    History of prediabetes    Status post placement of implantable loop recorder    Snoring    Memory deficits    Hypertensive encephalopathy, transient    Chronic low back pain    Middle cerebral artery stenosis, right    Left posterior MCA stroke - etiology  unclear at this time    Chronic anticoagulation    Recurrent strokes (HCC)    Hyponatremia    Prediabetes    SIRS (systemic inflammatory response syndrome) (HCC)    Tachycardia    Infrarenal abdominal aortic aneurysm (AAA) without rupture (HCC)    History of tobacco use    Chronic ischemic left MCA stroke    Abnormal laboratory test    Multiple thyroid nodules    Possible NPH (normal pressure hydrocephalus) (HCC)    Muscle spasms of both lower extremities    Aphasia    Atherosclerosis of native arteries of extremities with intermittent claudication, bilateral legs (HCC)    Benign prostatic hyperplasia with lower urinary tract symptoms    Type 2 diabetes mellitus with other diabetic ophthalmic complication (HCC)    Claudication of both lower extremities (HCC)    Focal epilepsy (HCC)    Internal carotid artery stent present    Vision problem    Dry eyes    Acute CVA (cerebrovascular accident) (HCC)    COVID    Internal carotid artery stenosis, left    Acute stroke due to ischemia (HCC)    PVC (premature ventricular contraction)    Carotid stenosis, bilateral    Iliac artery stenosis, left (HCC)    Cerebral ventriculomegaly          Past Medical History:   Diagnosis Date    Depression     Diabetes mellitus (HCC)     patient denies    Dyslipidemia 03/26/2019    GERD (gastroesophageal reflux disease)     Hyperlipidemia     Hypertension     Prediabetes     Prediabetes 04/03/2019    Stroke ([facility])         Past Surgical History:   Procedure Laterality Date    ARTHROSCOPIC REPAIR ACL Left     BACK SURGERY      twice    CARPAL TUNNEL RELEASE Right     IR CEREBRAL ANGIOGRAPHY  05/04/2023    IR STROKE ALERT  03/19/2019    SHOULDER SURGERY Left     US GUIDED THYROID BIOPSY  11/21/2023        Social History     Socioeconomic History    Marital status: /Civil Union     Spouse name: Not on file    Number of children: Not on file    Years of education: Not on file    Highest education level: Not on file   Occupational History     Not on file   Tobacco Use    Smoking status: Former    Smokeless tobacco: Never   Vaping Use    Vaping status: Never Used   Substance and Sexual Activity    Alcohol use: Never    Drug use: Never    Sexual activity: Not Currently   Other Topics Concern    Not on file   Social History Narrative    Lives in [place] with wife     Social Determinants of Health     Financial Resource Strain: Not on file   Food Insecurity: No Food Insecurity (4/12/2024)    Nursing - Inadequate Food Risk Classification     Worried About Running Out of Food in the Last Year: Never true     Ran Out of Food in the Last Year: Never true     Ran Out of Food in the Last Year: Not on file   Transportation Needs: No Transportation Needs (4/12/2024)    PRAPARE - Transportation     Lack of Transportation (Medical): No     Lack of Transportation (Non-Medical): No   Physical Activity: Not on file   Stress: Not on file   Social Connections: Not on file   Intimate Partner Violence: Not on file   Housing Stability: Low Risk  (4/12/2024)    Housing Stability Vital Sign     Unable to Pay for Housing in the Last Year: No     Number of Times Moved in the Last Year: 1     Homeless in the Last Year: No        Family History   Problem Relation Age of Onset    Hyperlipidemia Mother     Hypertension Mother     Diabetes unspecified Mother         prediabetes    Heart attack Mother     Diabetes Mother     Hyperlipidemia Father     Hypertension Father     Prostate cancer Father     Stroke Father     Hyperlipidemia Sister     Hypertension Sister     Hyperlipidemia Sister     Hypertension Sister     Depression Sister     Hypertension Sister     Hyperlipidemia Sister     Depression Sister     Hyperlipidemia Brother     Hypertension Brother     Hypertension Brother     Prostate cancer Brother     Hypothyroidism Daughter     Hypothyroidism Son     Diabetes unspecified Son     Seizures Neg Hx         Objective          I reviewed lab data in the chart as noted  above.  Additional labs as noted below    WBC   Date Value Ref Range Status   05/21/2024 10.24 (H) 4.31 - 10.16 Thousand/uL Final   05/20/2024 8.41 4.31 - 10.16 Thousand/uL Final   04/16/2024 7.69 4.31 - 10.16 Thousand/uL Final     Hemoglobin   Date Value Ref Range Status   05/21/2024 12.4 12.0 - 17.0 g/dL Final   05/20/2024 14.3 12.0 - 17.0 g/dL Final   04/16/2024 13.6 12.0 - 17.0 g/dL Final     Platelets   Date Value Ref Range Status   05/21/2024 221 149 - 390 Thousands/uL Final   05/20/2024 226 149 - 390 Thousands/uL Final   05/20/2024 263 149 - 390 Thousands/uL Final     MCV   Date Value Ref Range Status   05/21/2024 97 82 - 98 fL Final   05/20/2024 95 82 - 98 fL Final   04/16/2024 95 82 - 98 fL Final      Potassium   Date Value Ref Range Status   09/10/2024 3.8 3.5 - 5.1 mmol/L Final   09/09/2024 3.7 3.5 - 5.1 mmol/L Final   09/08/2024 3.8 3.5 - 5.1 mmol/L Final     Chloride   Date Value Ref Range Status   07/03/2024 103 96 - 108 mmol/L Final   05/21/2024 106 96 - 108 mmol/L Final   05/20/2024 103 96 - 108 mmol/L Final     CARBON DIOXIDE   Date Value Ref Range Status   09/10/2024 26.0 20.0 - 31.0 mmol/L Final   09/09/2024 26.0 20.0 - 31.0 mmol/L Final   09/08/2024 25.0 20.0 - 31.0 mmol/L Final     BUN   Date Value Ref Range Status   09/10/2024 28 (H) 9 - 23 mg/dL Final   09/09/2024 24 (H) 9 - 23 mg/dL Final   09/08/2024 22 9 - 23 mg/dL Final     Creatinine   Date Value Ref Range Status   09/10/2024 0.88 0.73 - 1.18 mg/dL Final   09/09/2024 0.83 0.73 - 1.18 mg/dL Final   09/08/2024 0.78 0.73 - 1.18 mg/dL Final     Glucose   Date Value Ref Range Status   09/10/2024 128 (H) 74 - 99 mg/dL Final   09/09/2024 98 74 - 99 mg/dL Final   09/08/2024 93 74 - 99 mg/dL Final     Calcium   Date Value Ref Range Status   09/10/2024 9.3 8.7 - 10.4 mg/dL Final   09/09/2024 9.2 8.7 - 10.4 mg/dL Final   09/08/2024 9.4 8.7 - 10.4 mg/dL Final     Albumin   Date Value Ref Range Status   07/03/2024 4.1 3.5 - 5.0 g/dL Final   05/20/2024  4.5 3.5 - 5.0 g/dL Final   04/12/2024 3.9 3.5 - 5.0 g/dL Final     ALBUMIN   Date Value Ref Range Status   08/31/2024 3.6 3.4 - 5.0 g/dL Final     Total Bilirubin   Date Value Ref Range Status   08/31/2024 0.3 0.2 - 1.1 mg/dL Final   07/03/2024 0.58 0.20 - 1.00 mg/dL Final     Comment:     Use of this assay is not recommended for patients undergoing treatment with eltrombopag due to the potential for falsely elevated results.  N-acetyl-p-benzoquinone imine (metabolite of Acetaminophen) will generate erroneously low results in samples for patients that have taken an overdose of Acetaminophen.   05/20/2024 0.45 0.20 - 1.00 mg/dL Final     Comment:     Use of this assay is not recommended for patients undergoing treatment with eltrombopag due to the potential for falsely elevated results.  N-acetyl-p-benzoquinone imine (metabolite of Acetaminophen) will generate erroneously low results in samples for patients that have taken an overdose of Acetaminophen.   04/12/2024 0.61 0.20 - 1.00 mg/dL Final     Comment:     Use of this assay is not recommended for patients undergoing treatment with eltrombopag due to the potential for falsely elevated results.  N-acetyl-p-benzoquinone imine (metabolite of Acetaminophen) will generate erroneously low results in samples for patients that have taken an overdose of Acetaminophen.     Alkaline Phosphatase   Date Value Ref Range Status   08/31/2024 92 46 - 116 IU/L Final   07/03/2024 82 34 - 104 U/L Final   05/20/2024 92 34 - 104 U/L Final   04/12/2024 92 34 - 104 U/L Final     AST   Date Value Ref Range Status   08/31/2024 18 <34 IU/L Final   07/03/2024 16 13 - 39 U/L Final   05/20/2024 14 13 - 39 U/L Final   04/12/2024 16 13 - 39 U/L Final     ALT   Date Value Ref Range Status   08/31/2024 18 10 - 49 IU/L Final   07/03/2024 22 7 - 52 U/L Final     Comment:     Specimen collection should occur prior to Sulfasalazine administration due to the potential for falsely depressed results.   "  05/20/2024 15 7 - 52 U/L Final     Comment:     Specimen collection should occur prior to Sulfasalazine administration due to the potential for falsely depressed results.    04/12/2024 17 7 - 52 U/L Final     Comment:     Specimen collection should occur prior to Sulfasalazine administration due to the potential for falsely depressed results.       No results found for: \"LDH\"  No results found for: \"TSH\"  No results found for: \"X3NYLPF\"   Free T4   Date Value Ref Range Status   05/01/2024 0.86 0.61 - 1.12 ng/dL Final     Comment:     Specimens with biotin concentrations > 10 ng/mL can lead to significant (> 10%) positive bias in result.         RECENT IMAGING:  No results found.     Total minutes spent reviewing EMR, seeing patient in clinic visit, documenting visit, placing treatment orders, and communicating with other medical providers: 30    Portions of the record may have been created with voice recognition software.  Occasional wrong word or \"sound a like\" substitutions may have occurred due to the inherent limitations of voice recognition software.  Read the chart carefully and recognize, using context, where substitutions have occurred.    "

## 2024-12-02 ENCOUNTER — TELEPHONE (OUTPATIENT)
Dept: NEUROLOGY | Facility: CLINIC | Age: 64
End: 2024-12-02

## 2024-12-05 NOTE — ASSESSMENT & PLAN NOTE
Form faxed to [phone]. Sent to scanning.   Patient with history of bilateral MCA strokes, with right stenosis and left stenosis with left ICA stent placement in May 2023  Baseline GCS 12, per EMS  GCS at time of admission 13, eye-opening to sound, confused, obeying commands  Follows with neurology outpatient  Per last neurology note BP goal of <130/80  Continue aspirin, Plavix, statin

## 2024-12-10 ENCOUNTER — OFFICE VISIT (OUTPATIENT)
Dept: NEUROLOGY | Facility: CLINIC | Age: 64
End: 2024-12-10
Payer: COMMERCIAL

## 2024-12-10 VITALS
HEIGHT: 66 IN | TEMPERATURE: 98.2 F | HEART RATE: 73 BPM | DIASTOLIC BLOOD PRESSURE: 82 MMHG | OXYGEN SATURATION: 99 % | SYSTOLIC BLOOD PRESSURE: 152 MMHG | BODY MASS INDEX: 32.44 KG/M2

## 2024-12-10 DIAGNOSIS — Z86.73 CHRONIC ISCHEMIC LEFT MCA STROKE: ICD-10-CM

## 2024-12-10 DIAGNOSIS — R06.83 SNORING: Primary | ICD-10-CM

## 2024-12-10 DIAGNOSIS — G40.109 FOCAL EPILEPSY (HCC): ICD-10-CM

## 2024-12-10 DIAGNOSIS — Z79.01 CHRONIC ANTICOAGULATION: ICD-10-CM

## 2024-12-10 DIAGNOSIS — I63.9 RECURRENT STROKES (HCC): ICD-10-CM

## 2024-12-10 PROCEDURE — 99215 OFFICE O/P EST HI 40 MIN: CPT | Performed by: PSYCHIATRY & NEUROLOGY

## 2024-12-10 NOTE — PROGRESS NOTES
Name: Constanza Zhu      : 1960      MRN: [id number]  Encounter Provider: Maddie Steele MD  Encounter Date: 12/10/2024   Encounter department: [facility]  :  Assessment & Plan  Snoring  Sleep medicine referral   Orders:    Ambulatory Referral to Sleep Medicine; Future    Recurrent strokes (HCC)  Likely atheroembolic in the setting of multiple stenosis (watershed infarcts)  -for secondary stroke prevention, recommend continuation of combination of aspirin, brilinta and atorvastatin  -Blood Pressure goal < 130/80, BP is at goal currently, although can be elevated in AM, and wife has readings with him, messaged cardiology   -LDL goal <70  -snoring - sleep medicine referral in place   -PT, OT, ST, and vision therapy currently    Counseling/stroke education -   -I advised patient to avoid using NSAIDs for headaches or other pain and to stick to tylenol if needed  -Recommend lifestyle modifications such as mediterranean diet & regular exercise regimen atleast 4-5 times a week for 20-30 minutes.   -I educated patient/family regarding medication compliance  -control of hypertension; defer management to primary        Chronic ischemic left MCA stroke         Chronic anticoagulation       Follow up in 3 months     I would be happy to see the patient sooner if any new questions/concerns arise.  Patient/Guardian was advised to the call the office if they have any questions and concerns in the meantime.     Patient/Guardian does understand that if they have any new stroke like symptoms such as facial droop on one side, weakness/paralysis on either side, speech trouble, numbness on one side, balance issues, any vision changes, extreme dizziness or any new headache, to call - immediately or to proceed to the nearest ER immediately.          History of Present Illness   HPI    This is a 63 y/o Male who is here as a follow up for history of multiple strokes.     Patient is doing well  now, he is still working with therapy, and is ambulating with a walker now, progressed from wheelchair. He is otherwise healthy denies any new TIA/CVA like symptoms and is maintained on aspirin, brilinta and atorvastatin and no bleeding or bruising issues.     Wife wants another referral for sleep medicine. His speech is slowly improving and he is able to comprehend better, but sometimes he struggles with saying the right word, or fluency is an issue at times.     Review of Systems   Constitutional: Negative.    HENT: Negative.     Eyes: Negative.    Respiratory: Negative.     Cardiovascular: Negative.    Gastrointestinal: Negative.    Endocrine: Negative.    Genitourinary: Negative.    Musculoskeletal: Negative.    Skin: Negative.    Allergic/Immunologic: Negative.    Neurological: Negative.    Hematological: Negative.    Psychiatric/Behavioral: Negative.     All other systems reviewed and are negative.   I have personally reviewed the MA's review of systems and made changes as necessary.         Objective   There were no vitals taken for this visit.    Physical Exam  General - patient is alert   Speech - mild dysarthria noted, no aphasia noted.     Neuro:   Cranial nerves: PERRL, EOMI, facial sensation intact to soft touch in V1, V2 and V3, no facial asymmetry noted, uvula/palate midline, tongue midline.   Motor: mild weakness noted in the LUE and LLE  Sensory - intact to soft touch throughout  Coordination - no ataxia/dysmetria noted  Gait - ambulates with a walker   Neurological Exam        Administrative Statements   I have spent a total time of 40 minutes in caring for this patient on the day of the visit/encounter including Risks and benefits of tx options, Instructions for management, Patient and family education, Counseling / Coordination of care, Documenting in the medical record, Reviewing / ordering tests, medicine, procedures  , Obtaining or reviewing history  , and Communicating with other healthcare  professionals .

## 2024-12-10 NOTE — ASSESSMENT & PLAN NOTE
Follow up in 3 months     I would be happy to see the patient sooner if any new questions/concerns arise.  Patient/Guardian was advised to the call the office if they have any questions and concerns in the meantime.     Patient/Guardian does understand that if they have any new stroke like symptoms such as facial droop on one side, weakness/paralysis on either side, speech trouble, numbness on one side, balance issues, any vision changes, extreme dizziness or any new headache, to call 9-1-1 immediately or to proceed to the nearest ER immediately.

## 2024-12-10 NOTE — ASSESSMENT & PLAN NOTE
Likely atheroembolic in the setting of multiple stenosis (watershed infarcts)  -for secondary stroke prevention, recommend continuation of combination of aspirin, brilinta and atorvastatin  -Blood Pressure goal < 130/80, BP is at goal currently, although can be elevated in AM, and wife has readings with him, messaged cardiology   -LDL goal <70  -snoring - sleep medicine referral in place   -PT, OT, ST, and vision therapy currently    Counseling/stroke education -   -I advised patient to avoid using NSAIDs for headaches or other pain and to stick to tylenol if needed  -Recommend lifestyle modifications such as mediterranean diet & regular exercise regimen atleast 4-5 times a week for 20-30 minutes.   -I educated patient/family regarding medication compliance  -control of hypertension; defer management to primary

## 2024-12-24 ENCOUNTER — APPOINTMENT (EMERGENCY)
Dept: RADIOLOGY | Facility: HOSPITAL | Age: 64
DRG: 074 | End: 2024-12-24
Payer: COMMERCIAL

## 2024-12-24 ENCOUNTER — APPOINTMENT (EMERGENCY)
Dept: CT IMAGING | Facility: HOSPITAL | Age: 64
DRG: 074 | End: 2024-12-24
Payer: COMMERCIAL

## 2024-12-24 ENCOUNTER — TELEPHONE (OUTPATIENT)
Age: 64
End: 2024-12-24

## 2024-12-24 ENCOUNTER — HOSPITAL ENCOUNTER (INPATIENT)
Facility: HOSPITAL | Age: 64
LOS: 1 days | Discharge: HOME HEALTH | DRG: 074 | End: 2024-12-27
Attending: EMERGENCY MEDICINE
Payer: COMMERCIAL

## 2024-12-24 DIAGNOSIS — M62.838 MUSCLE SPASMS OF BOTH LOWER EXTREMITIES: ICD-10-CM

## 2024-12-24 DIAGNOSIS — R45.1 ANXIETY WITH AGITATION: ICD-10-CM

## 2024-12-24 DIAGNOSIS — S92.424S CLOSED NONDISPLACED FRACTURE OF DISTAL PHALANX OF RIGHT GREAT TOE, SEQUELA: ICD-10-CM

## 2024-12-24 DIAGNOSIS — R41.82 ALTERED MENTAL STATUS: ICD-10-CM

## 2024-12-24 DIAGNOSIS — F41.9 ANXIETY WITH AGITATION: ICD-10-CM

## 2024-12-24 DIAGNOSIS — I10 PRIMARY HYPERTENSION: ICD-10-CM

## 2024-12-24 DIAGNOSIS — S92.424A CLOSED NONDISPLACED FRACTURE OF DISTAL PHALANX OF RIGHT GREAT TOE, INITIAL ENCOUNTER: Primary | ICD-10-CM

## 2024-12-24 PROBLEM — S92.404A CLOSED NONDISPLACED FRACTURE OF PHALANX OF RIGHT GREAT TOE: Status: ACTIVE | Noted: 2024-12-24

## 2024-12-24 LAB
2HR DELTA HS TROPONIN: 0 NG/L
ALBUMIN SERPL BCG-MCNC: 3.8 G/DL (ref 3.5–5)
ALP SERPL-CCNC: 105 U/L (ref 34–104)
ALT SERPL W P-5'-P-CCNC: 15 U/L (ref 7–52)
AMPHETAMINES SERPL QL SCN: NEGATIVE
ANION GAP SERPL CALCULATED.3IONS-SCNC: 3 MMOL/L (ref 4–13)
APAP SERPL-MCNC: 9 UG/ML (ref 10–20)
AST SERPL W P-5'-P-CCNC: 14 U/L (ref 13–39)
BACTERIA UR QL AUTO: ABNORMAL /HPF
BARBITURATES UR QL: NEGATIVE
BASE EXCESS BLDA CALC-SCNC: -1 MMOL/L (ref -2–3)
BASOPHILS # BLD AUTO: 0.08 THOUSANDS/ÂΜL (ref 0–0.1)
BASOPHILS NFR BLD AUTO: 1 % (ref 0–1)
BENZODIAZ UR QL: NEGATIVE
BILIRUB SERPL-MCNC: 0.48 MG/DL (ref 0.2–1)
BILIRUB UR QL STRIP: NEGATIVE
BUN SERPL-MCNC: 26 MG/DL (ref 5–25)
CA-I BLD-SCNC: 1.23 MMOL/L (ref 1.12–1.32)
CALCIUM SERPL-MCNC: 8.5 MG/DL (ref 8.4–10.2)
CARDIAC TROPONIN I PNL SERPL HS: 8 NG/L (ref ?–50)
CARDIAC TROPONIN I PNL SERPL HS: 8 NG/L (ref ?–50)
CHLORIDE SERPL-SCNC: 108 MMOL/L (ref 96–108)
CLARITY UR: CLEAR
CO2 SERPL-SCNC: 28 MMOL/L (ref 21–32)
COCAINE UR QL: NEGATIVE
COLOR UR: YELLOW
CREAT SERPL-MCNC: 1.11 MG/DL (ref 0.6–1.3)
EOSINOPHIL # BLD AUTO: 0.21 THOUSAND/ÂΜL (ref 0–0.61)
EOSINOPHIL NFR BLD AUTO: 2 % (ref 0–6)
ERYTHROCYTE [DISTWIDTH] IN BLOOD BY AUTOMATED COUNT: 13.6 % (ref 11.6–15.1)
ETHANOL SERPL-MCNC: <10 MG/DL
FENTANYL UR QL SCN: NEGATIVE
FLUAV AG UPPER RESP QL IA.RAPID: NEGATIVE
FLUAV RNA RESP QL NAA+PROBE: NEGATIVE
FLUBV AG UPPER RESP QL IA.RAPID: NEGATIVE
FLUBV RNA RESP QL NAA+PROBE: NEGATIVE
GFR SERPL CREATININE-BSD FRML MDRD: 69 ML/MIN/1.73SQ M
GLUCOSE SERPL-MCNC: 114 MG/DL (ref 65–140)
GLUCOSE SERPL-MCNC: 116 MG/DL (ref 65–140)
GLUCOSE SERPL-MCNC: 127 MG/DL (ref 65–140)
GLUCOSE SERPL-MCNC: 83 MG/DL (ref 65–140)
GLUCOSE UR STRIP-MCNC: ABNORMAL MG/DL
GRAN CASTS #/AREA URNS LPF: ABNORMAL /[LPF]
HCO3 BLDA-SCNC: 25.4 MMOL/L (ref 24–30)
HCT VFR BLD AUTO: 42.5 % (ref 36.5–49.3)
HCT VFR BLD CALC: 41 % (ref 36.5–49.3)
HGB BLD-MCNC: 13.8 G/DL (ref 12–17)
HGB BLDA-MCNC: 13.9 G/DL (ref 12–17)
HGB UR QL STRIP.AUTO: NEGATIVE
HYDROCODONE UR QL SCN: NEGATIVE
IMM GRANULOCYTES # BLD AUTO: 0.02 THOUSAND/UL (ref 0–0.2)
IMM GRANULOCYTES NFR BLD AUTO: 0 % (ref 0–2)
KETONES UR STRIP-MCNC: NEGATIVE MG/DL
LACTATE SERPL-SCNC: 1.1 MMOL/L (ref 0.5–2)
LEUKOCYTE ESTERASE UR QL STRIP: NEGATIVE
LEVETIRACETAM SERPL-MCNC: 60 UG/ML (ref 12–46)
LYMPHOCYTES # BLD AUTO: 1.92 THOUSANDS/ÂΜL (ref 0.6–4.47)
LYMPHOCYTES NFR BLD AUTO: 22 % (ref 14–44)
MCH RBC QN AUTO: 30.3 PG (ref 26.8–34.3)
MCHC RBC AUTO-ENTMCNC: 32.5 G/DL (ref 31.4–37.4)
MCV RBC AUTO: 93 FL (ref 82–98)
METHADONE UR QL: NEGATIVE
MONOCYTES # BLD AUTO: 0.67 THOUSAND/ÂΜL (ref 0.17–1.22)
MONOCYTES NFR BLD AUTO: 8 % (ref 4–12)
MUCOUS THREADS UR QL AUTO: ABNORMAL
NEUTROPHILS # BLD AUTO: 5.69 THOUSANDS/ÂΜL (ref 1.85–7.62)
NEUTS SEG NFR BLD AUTO: 67 % (ref 43–75)
NITRITE UR QL STRIP: NEGATIVE
NON-SQ EPI CELLS URNS QL MICRO: ABNORMAL /HPF
NRBC BLD AUTO-RTO: 0 /100 WBCS
OPIATES UR QL SCN: NEGATIVE
OXYCODONE+OXYMORPHONE UR QL SCN: NEGATIVE
PCO2 BLD: 27 MMOL/L (ref 21–32)
PCO2 BLD: 48.5 MM HG (ref 42–50)
PCP UR QL: NEGATIVE
PH BLD: 7.33 [PH] (ref 7.3–7.4)
PH UR STRIP.AUTO: 6 [PH]
PLATELET # BLD AUTO: 217 THOUSANDS/UL (ref 149–390)
PMV BLD AUTO: 9.9 FL (ref 8.9–12.7)
PO2 BLD: 22 MM HG (ref 35–45)
POTASSIUM BLD-SCNC: 4.2 MMOL/L (ref 3.5–5.3)
POTASSIUM SERPL-SCNC: 4.3 MMOL/L (ref 3.5–5.3)
PROT SERPL-MCNC: 6 G/DL (ref 6.4–8.4)
PROT UR STRIP-MCNC: ABNORMAL MG/DL
RBC # BLD AUTO: 4.55 MILLION/UL (ref 3.88–5.62)
RBC #/AREA URNS AUTO: ABNORMAL /HPF
RSV RNA RESP QL NAA+PROBE: NEGATIVE
SALICYLATES SERPL-MCNC: <5 MG/DL (ref 3–20)
SAO2 % BLD FROM PO2: 33 % (ref 60–85)
SARS-COV+SARS-COV-2 AG RESP QL IA.RAPID: NEGATIVE
SARS-COV-2 RNA RESP QL NAA+PROBE: NEGATIVE
SODIUM BLD-SCNC: 142 MMOL/L (ref 136–145)
SODIUM SERPL-SCNC: 139 MMOL/L (ref 135–147)
SP GR UR STRIP.AUTO: 1.02 (ref 1–1.03)
SPECIMEN SOURCE: ABNORMAL
THC UR QL: NEGATIVE
TSH SERPL DL<=0.05 MIU/L-ACNC: 1.88 UIU/ML (ref 0.45–4.5)
TSH SERPL DL<=0.05 MIU/L-ACNC: 1.95 UIU/ML (ref 0.45–4.5)
UROBILINOGEN UR STRIP-ACNC: <2 MG/DL
WBC # BLD AUTO: 8.59 THOUSAND/UL (ref 4.31–10.16)
WBC #/AREA URNS AUTO: ABNORMAL /HPF

## 2024-12-24 PROCEDURE — 84132 ASSAY OF SERUM POTASSIUM: CPT

## 2024-12-24 PROCEDURE — 85025 COMPLETE CBC W/AUTO DIFF WBC: CPT | Performed by: EMERGENCY MEDICINE

## 2024-12-24 PROCEDURE — 73630 X-RAY EXAM OF FOOT: CPT

## 2024-12-24 PROCEDURE — 84295 ASSAY OF SERUM SODIUM: CPT

## 2024-12-24 PROCEDURE — 83520 IMMUNOASSAY QUANT NOS NONAB: CPT | Performed by: EMERGENCY MEDICINE

## 2024-12-24 PROCEDURE — 82948 REAGENT STRIP/BLOOD GLUCOSE: CPT

## 2024-12-24 PROCEDURE — 96361 HYDRATE IV INFUSION ADD-ON: CPT

## 2024-12-24 PROCEDURE — 84443 ASSAY THYROID STIM HORMONE: CPT | Performed by: EMERGENCY MEDICINE

## 2024-12-24 PROCEDURE — 85014 HEMATOCRIT: CPT

## 2024-12-24 PROCEDURE — 80307 DRUG TEST PRSMV CHEM ANLYZR: CPT | Performed by: EMERGENCY MEDICINE

## 2024-12-24 PROCEDURE — 70450 CT HEAD/BRAIN W/O DYE: CPT

## 2024-12-24 PROCEDURE — 84443 ASSAY THYROID STIM HORMONE: CPT | Performed by: INTERNAL MEDICINE

## 2024-12-24 PROCEDURE — 99223 1ST HOSP IP/OBS HIGH 75: CPT | Performed by: INTERNAL MEDICINE

## 2024-12-24 PROCEDURE — 82077 ASSAY SPEC XCP UR&BREATH IA: CPT | Performed by: EMERGENCY MEDICINE

## 2024-12-24 PROCEDURE — 99285 EMERGENCY DEPT VISIT HI MDM: CPT | Performed by: EMERGENCY MEDICINE

## 2024-12-24 PROCEDURE — 80143 DRUG ASSAY ACETAMINOPHEN: CPT | Performed by: EMERGENCY MEDICINE

## 2024-12-24 PROCEDURE — 36415 COLL VENOUS BLD VENIPUNCTURE: CPT | Performed by: EMERGENCY MEDICINE

## 2024-12-24 PROCEDURE — 0241U HB NFCT DS VIR RESP RNA 4 TRGT: CPT | Performed by: INTERNAL MEDICINE

## 2024-12-24 PROCEDURE — 87811 SARS-COV-2 COVID19 W/OPTIC: CPT | Performed by: EMERGENCY MEDICINE

## 2024-12-24 PROCEDURE — 99245 OFF/OP CONSLTJ NEW/EST HI 55: CPT | Performed by: PSYCHIATRY & NEUROLOGY

## 2024-12-24 PROCEDURE — 80053 COMPREHEN METABOLIC PANEL: CPT | Performed by: EMERGENCY MEDICINE

## 2024-12-24 PROCEDURE — 82947 ASSAY GLUCOSE BLOOD QUANT: CPT

## 2024-12-24 PROCEDURE — 71045 X-RAY EXAM CHEST 1 VIEW: CPT

## 2024-12-24 PROCEDURE — 96374 THER/PROPH/DIAG INJ IV PUSH: CPT

## 2024-12-24 PROCEDURE — 82330 ASSAY OF CALCIUM: CPT

## 2024-12-24 PROCEDURE — 99285 EMERGENCY DEPT VISIT HI MDM: CPT

## 2024-12-24 PROCEDURE — 87804 INFLUENZA ASSAY W/OPTIC: CPT | Performed by: EMERGENCY MEDICINE

## 2024-12-24 PROCEDURE — 80179 DRUG ASSAY SALICYLATE: CPT | Performed by: EMERGENCY MEDICINE

## 2024-12-24 PROCEDURE — 82803 BLOOD GASES ANY COMBINATION: CPT

## 2024-12-24 PROCEDURE — 83036 HEMOGLOBIN GLYCOSYLATED A1C: CPT | Performed by: INTERNAL MEDICINE

## 2024-12-24 PROCEDURE — 93005 ELECTROCARDIOGRAM TRACING: CPT

## 2024-12-24 PROCEDURE — 81001 URINALYSIS AUTO W/SCOPE: CPT | Performed by: EMERGENCY MEDICINE

## 2024-12-24 PROCEDURE — 83605 ASSAY OF LACTIC ACID: CPT | Performed by: EMERGENCY MEDICINE

## 2024-12-24 PROCEDURE — 84484 ASSAY OF TROPONIN QUANT: CPT | Performed by: EMERGENCY MEDICINE

## 2024-12-24 RX ORDER — LEVETIRACETAM 500 MG/5ML
1000 INJECTION, SOLUTION, CONCENTRATE INTRAVENOUS ONCE
Status: COMPLETED | OUTPATIENT
Start: 2024-12-24 | End: 2024-12-24

## 2024-12-24 RX ORDER — LEVETIRACETAM 500 MG/1
1500 TABLET ORAL EVERY 12 HOURS SCHEDULED
Status: DISCONTINUED | OUTPATIENT
Start: 2024-12-25 | End: 2024-12-27 | Stop reason: HOSPADM

## 2024-12-24 RX ORDER — MIRTAZAPINE 15 MG/1
7.5 TABLET, FILM COATED ORAL
Status: DISCONTINUED | OUTPATIENT
Start: 2024-12-25 | End: 2024-12-27 | Stop reason: HOSPADM

## 2024-12-24 RX ORDER — ASPIRIN 81 MG/1
81 TABLET, CHEWABLE ORAL DAILY
Status: DISCONTINUED | OUTPATIENT
Start: 2024-12-25 | End: 2024-12-27 | Stop reason: HOSPADM

## 2024-12-24 RX ORDER — METOPROLOL SUCCINATE 50 MG/1
50 TABLET, EXTENDED RELEASE ORAL DAILY
Status: DISCONTINUED | OUTPATIENT
Start: 2024-12-25 | End: 2024-12-27 | Stop reason: HOSPADM

## 2024-12-24 RX ORDER — LEVETIRACETAM 500 MG/5ML
1500 INJECTION, SOLUTION, CONCENTRATE INTRAVENOUS ONCE
Status: COMPLETED | OUTPATIENT
Start: 2024-12-24 | End: 2024-12-24

## 2024-12-24 RX ORDER — AMANTADINE HYDROCHLORIDE 100 MG/1
100 CAPSULE, GELATIN COATED ORAL 2 TIMES DAILY
Status: DISCONTINUED | OUTPATIENT
Start: 2024-12-24 | End: 2024-12-27 | Stop reason: HOSPADM

## 2024-12-24 RX ORDER — TAMSULOSIN HYDROCHLORIDE 0.4 MG/1
0.4 CAPSULE ORAL
Status: DISCONTINUED | OUTPATIENT
Start: 2024-12-24 | End: 2024-12-27 | Stop reason: HOSPADM

## 2024-12-24 RX ORDER — INSULIN LISPRO 100 [IU]/ML
1-5 INJECTION, SOLUTION INTRAVENOUS; SUBCUTANEOUS EVERY 6 HOURS SCHEDULED
Status: DISCONTINUED | OUTPATIENT
Start: 2024-12-24 | End: 2024-12-25

## 2024-12-24 RX ORDER — ACETAMINOPHEN 325 MG/1
650 TABLET ORAL EVERY 6 HOURS PRN
Status: DISCONTINUED | OUTPATIENT
Start: 2024-12-24 | End: 2024-12-27 | Stop reason: HOSPADM

## 2024-12-24 RX ORDER — HYDROCHLOROTHIAZIDE 12.5 MG/1
12.5 TABLET ORAL DAILY
COMMUNITY
Start: 2024-12-10 | End: 2024-12-27

## 2024-12-24 RX ORDER — ATORVASTATIN CALCIUM 40 MG/1
40 TABLET, FILM COATED ORAL
Status: DISCONTINUED | OUTPATIENT
Start: 2024-12-25 | End: 2024-12-27 | Stop reason: HOSPADM

## 2024-12-24 RX ORDER — ENOXAPARIN SODIUM 100 MG/ML
40 INJECTION SUBCUTANEOUS DAILY
Status: DISCONTINUED | OUTPATIENT
Start: 2024-12-24 | End: 2024-12-27 | Stop reason: HOSPADM

## 2024-12-24 RX ORDER — BACLOFEN 10 MG/1
5 TABLET ORAL 2 TIMES DAILY PRN
Status: DISCONTINUED | OUTPATIENT
Start: 2024-12-24 | End: 2024-12-27 | Stop reason: HOSPADM

## 2024-12-24 RX ORDER — PANTOPRAZOLE SODIUM 20 MG/1
20 TABLET, DELAYED RELEASE ORAL DAILY
Status: DISCONTINUED | OUTPATIENT
Start: 2024-12-25 | End: 2024-12-27 | Stop reason: HOSPADM

## 2024-12-24 RX ORDER — DONEPEZIL HYDROCHLORIDE 5 MG/1
10 TABLET, FILM COATED ORAL DAILY
Status: DISCONTINUED | OUTPATIENT
Start: 2024-12-24 | End: 2024-12-27 | Stop reason: HOSPADM

## 2024-12-24 RX ADMIN — SODIUM CHLORIDE 1000 ML: 0.9 INJECTION, SOLUTION INTRAVENOUS at 09:31

## 2024-12-24 RX ADMIN — ENOXAPARIN SODIUM 40 MG: 40 INJECTION SUBCUTANEOUS at 15:37

## 2024-12-24 RX ADMIN — LEVETIRACETAM 1000 MG: 500 INJECTION, SOLUTION INTRAVENOUS at 10:30

## 2024-12-24 RX ADMIN — Medication 5 MG: at 21:27

## 2024-12-24 RX ADMIN — LEVETIRACETAM 1500 MG: 500 INJECTION, SOLUTION INTRAVENOUS at 21:27

## 2024-12-24 RX ADMIN — TICAGRELOR 90 MG: 90 TABLET ORAL at 21:27

## 2024-12-24 RX ADMIN — AMANTADINE HYDROCHLORIDE 100 MG: 100 CAPSULE ORAL at 17:07

## 2024-12-24 RX ADMIN — DONEPEZIL HYDROCHLORIDE 10 MG: 5 TABLET ORAL at 15:37

## 2024-12-24 RX ADMIN — TAMSULOSIN HYDROCHLORIDE 0.4 MG: 0.4 CAPSULE ORAL at 15:37

## 2024-12-24 NOTE — ED PROVIDER NOTES
Time reflects when diagnosis was documented in both MDM as applicable and the Disposition within this note       Time User Action Codes Description Comment    12/24/2024 11:01 AM Myriam Leger Add [S92.424A] Closed nondisplaced fracture of distal phalanx of right great toe, initial encounter     12/24/2024 12:53 PM Myriam Leger Add [R41.82] Altered mental status           ED Disposition       ED Disposition   Admit    Condition   Stable    Date/Time   Tue Dec 24, 2024  1:00 PM    Comment   Case was discussed with NICHOLAS and the patient's admission status was agreed to be Admission Status: observation status to the service of Dr. Freed .               Assessment & Plan       Medical Decision Making  64 year old male with history of prior stroke and seizure disorder presents for evaluation of altered mental status.  Currently sleepy, but arouses to voice.  Disoriented to time.  No history of dementia per wife.  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  CTH without acute pathology.  Exam without focal deficits to suggest LVO.  Labs unremarkable.  Possible seizure with post ictal state.  Discussed with neurology.  1 g IV keppra given while awaiting keppra level.  No improvement in mental status while being observed in the ED.  Patient admitted for further evaluation and management.    Amount and/or Complexity of Data Reviewed  Labs: ordered.  Radiology: ordered and independent interpretation performed.    Risk  Prescription drug management.  Decision regarding hospitalization.        ED Course as of 12/24/24 1325   Tue Dec 24, 2024   1019 Discussed with neurology.  Possible breakthrough seizure.  1 g IV keppra ordered while awaiting keppra level.       Medications   sodium chloride 0.9 % bolus 1,000 mL (0 mL Intravenous Stopped 12/24/24 1141)   levETIRAcetam (KEPPRA) injection 1,000 mg (1,000 mg Intravenous Given 12/24/24 1030)       ED Risk Strat  Scores                                              History of Present Illness       Chief Complaint   Patient presents with    Altered Mental Status     Pt had outburst at home around 2am. Per EMS wife states was unresponsive in chair at 0815. Pt with hx of stroke. EMS states pt had episode similar in past when bp under 140's       Past Medical History:   Diagnosis Date    Depression     Diabetes mellitus (HCC)     patient denies    Dyslipidemia 03/26/2019    GERD (gastroesophageal reflux disease)     Hyperlipidemia     Hypertension     Prediabetes     Prediabetes 04/03/2019    Stroke (HCC)       Past Surgical History:   Procedure Laterality Date    ARTHROSCOPIC REPAIR ACL Left     BACK SURGERY      twice    CARPAL TUNNEL RELEASE Right     IR CEREBRAL ANGIOGRAPHY  05/04/2023    IR STROKE ALERT  03/19/2019    SHOULDER SURGERY Left     US GUIDED THYROID BIOPSY  11/21/2023      Family History   Problem Relation Age of Onset    Hyperlipidemia Mother     Hypertension Mother     Diabetes unspecified Mother         prediabetes    Heart attack Mother     Diabetes Mother     Hyperlipidemia Father     Hypertension Father     Prostate cancer Father     Stroke Father     Hyperlipidemia Sister     Hypertension Sister     Hyperlipidemia Sister     Hypertension Sister     Depression Sister     Hypertension Sister     Hyperlipidemia Sister     Depression Sister     Hyperlipidemia Brother     Hypertension Brother     Hypertension Brother     Prostate cancer Brother     Hypothyroidism Daughter     Hypothyroidism Son     Diabetes unspecified Son     Seizures Neg Hx       Social History     Tobacco Use    Smoking status: Former    Smokeless tobacco: Never   Vaping Use    Vaping status: Never Used   Substance Use Topics    Alcohol use: Never    Drug use: Never      E-Cigarette/Vaping    E-Cigarette Use Never User       E-Cigarette/Vaping Substances    Nicotine No     THC No     CBD No     Flavoring No     Other No     Unknown No        I have reviewed and agree with the history as documented.     64 year old male brought by EMS for evaluation of altered mental status.  Per EMS report, patient had become agitated around 7 am this morning then around 8:15, he suddenly became unresponsive.  On EMS arrival, patient was intermittently arousable to sternal rub.  Patient has history of prior strokes with similar symptoms when his systolic blood pressure drops below 140 mmHg.  No recent falls reported; however, contusion noted to right great toe.  Patient reportedly has history of seizures and had recently discontinued Keppra.      Altered Mental Status      Review of Systems        Objective       ED Triage Vitals   Temperature Pulse Blood Pressure Respirations SpO2 Patient Position - Orthostatic VS   12/24/24 0934 12/24/24 0916 12/24/24 0916 12/24/24 0916 12/24/24 0916 12/24/24 0916   98.1 °F (36.7 °C) 68 165/67 18 98 % Lying      Temp Source Heart Rate Source BP Location FiO2 (%) Pain Score    12/24/24 0934 12/24/24 0916 12/24/24 0916 -- 12/24/24 0916    Temporal Monitor Left arm  No Pain      Vitals      Date and Time Temp Pulse SpO2 Resp BP Pain Score FACES Pain Rating User   12/24/24 1324 -- -- -- -- -- No Pain -- RA   12/24/24 1230 -- 68 99 % 17 161/78 -- --    12/24/24 1145 -- 63 99 % 16 159/72 -- --    12/24/24 1130 -- 58 99 % 15 164/66 -- -- MB   12/24/24 1115 -- 61 100 % 14 144/65 -- -- MB   12/24/24 1030 -- 61 99 % 18 139/65 -- --    12/24/24 1015 -- 62 99 % 15 131/57 -- -- MB   12/24/24 0945 -- 63 99 % 16 127/58 -- --    12/24/24 0934 98.1 °F (36.7 °C) -- -- -- -- -- --    12/24/24 0916 -- 68 98 % 18 165/67 No Pain -- CM            Physical Exam  Vitals and nursing note reviewed.   HENT:      Head: Normocephalic and atraumatic.   Eyes:      Extraocular Movements: Extraocular movements intact.      Conjunctiva/sclera: Conjunctivae normal.      Pupils: Pupils are equal, round, and reactive to light.   Cardiovascular:      Rate and  Rhythm: Normal rate and regular rhythm.      Pulses: Normal pulses.      Heart sounds: Normal heart sounds.   Pulmonary:      Effort: Pulmonary effort is normal. No respiratory distress.      Breath sounds: Normal breath sounds.   Abdominal:      General: There is no distension.      Palpations: Abdomen is soft.      Tenderness: There is no abdominal tenderness.   Musculoskeletal:      Comments: Ecchymosis base of right great toe with overlying skin tear   Skin:     General: Skin is warm and dry.   Neurological:      Mental Status: He is confused.      GCS: GCS eye subscore is 3. GCS verbal subscore is 4. GCS motor subscore is 6.      Cranial Nerves: No dysarthria or facial asymmetry.      Sensory: Sensation is intact.      Motor: Motor function is intact. No abnormal muscle tone.      Coordination: Finger-Nose-Finger Test and Heel to Shin Test normal.         Results Reviewed       Procedure Component Value Units Date/Time    HS Troponin I 2hr [[id number]]  (Normal) Collected: 12/24/24 1141    Lab Status: Final result Specimen: Blood from Arm, Left Updated: 12/24/24 1219     hs TnI 2hr 8 ng/L      Delta 2hr hsTnI 0 ng/L     TSH, 3rd generation with Free T4 reflex [[id number]]  (Normal) Collected: 12/24/24 0929    Lab Status: Final result Specimen: Blood from Arm, Left Updated: 12/24/24 1015     TSH 3RD GENERATON 1.954 uIU/mL     Levetiracetam level [[id number]] Collected: 12/24/24 1008    Lab Status: In process Specimen: Blood from Arm, Left Updated: 12/24/24 1013    HS Troponin 0hr (reflex protocol) [[id number]]  (Normal) Collected: 12/24/24 0929    Lab Status: Final result Specimen: Blood from Arm, Left Updated: 12/24/24 1006     hs TnI 0hr 8 ng/L     Ethanol [[id number]]  (Normal) Collected: 12/24/24 0929    Lab Status: Final result Specimen: Blood from Arm, Left Updated: 12/24/24 0958     Ethanol Lvl <10 mg/dL     Comprehensive metabolic panel [[id number]]  (Abnormal) Collected: 12/24/24 0929    Lab Status: Final  result Specimen: Blood from Arm, Left Updated: 12/24/24 0958     Sodium 139 mmol/L      Potassium 4.3 mmol/L      Chloride 108 mmol/L      CO2 28 mmol/L      ANION GAP 3 mmol/L      BUN 26 mg/dL      Creatinine 1.11 mg/dL      Glucose 116 mg/dL      Calcium 8.5 mg/dL      AST 14 U/L      ALT 15 U/L      Alkaline Phosphatase 105 U/L      Total Protein 6.0 g/dL      Albumin 3.8 g/dL      Total Bilirubin 0.48 mg/dL      eGFR 69 ml/min/1.73sq m     Narrative:      [employer] guidelines for Chronic Kidney Disease (CKD):     Stage 1 with normal or high GFR (GFR > 90 mL/min/1.73 square meters)    Stage 2 Mild CKD (GFR = 60-89 mL/min/1.73 square meters)    Stage 3A Moderate CKD (GFR = 45-59 mL/min/1.73 square meters)    Stage 3B Moderate CKD (GFR = 30-44 mL/min/1.73 square meters)    Stage 4 Severe CKD (GFR = 15-29 mL/min/1.73 square meters)    Stage 5 End Stage CKD (GFR <15 mL/min/1.73 square meters)  Note: GFR calculation is accurate only with a steady state creatinine    Lactic acid, plasma (w/reflex if result > 2.0) [[id number]]  (Normal) Collected: 12/24/24 0929    Lab Status: Final result Specimen: Blood from Arm, Left Updated: 12/24/24 0958     LACTIC ACID 1.1 mmol/L     Narrative:      Result may be elevated if tourniquet was used during collection.    Salicylate level [[id number]]  (Normal) Collected: 12/24/24 0929    Lab Status: Final result Specimen: Blood from Arm, Left Updated: 12/24/24 0958     Salicylate Lvl <5 mg/dL     Acetaminophen level-If concentration is detectable, please discuss with medical  on call. [[id number]]  (Abnormal) Collected: 12/24/24 0929    Lab Status: Final result Specimen: Blood from Arm, Left Updated: 12/24/24 0958     Acetaminophen Level 9 ug/mL     FLU/COVID Rapid Antigen (30 min. TAT) - Preferred screening test in ED [[id number]]  (Normal) Collected: 12/24/24 0929    Lab Status: Final result Specimen: Nares from Nose Updated: 12/24/24 0955     SARS  COV Rapid Antigen Negative     Influenza A Rapid Antigen Negative     Influenza B Rapid Antigen Negative    Narrative:      This test has been performed using the [employer] Dalia 2 FLU+SARS Antigen test under the Emergency Use Authorization (EUA). This test has been validated by the  and verified by the performing laboratory. The Dalia uses lateral flow immunofluorescent sandwich assay to detect SARS-COV, Influenza A and Influenza B Antigen.     The [employer] Dalia 2 SARS Antigen test does not differentiate between SARS-CoV and SARS-CoV-2.     Negative results are presumptive and may be confirmed with a molecular assay, if necessary, for patient management. Negative results do not rule out SARS-CoV-2 or influenza infection and should not be used as the sole basis for treatment or patient management decisions. A negative test result may occur if the level of antigen in a sample is below the limit of detection of this test.     Positive results are indicative of the presence of viral antigens, but do not rule out bacterial infection or co-infection with other viruses.     All test results should be used as an adjunct to clinical observations and other information available to the provider.    FOR PEDIATRIC PATIENTS - copy/paste COVID Guidelines URL to browser: [email or web address]    CBC and differential [[id number]] Collected: 12/24/24 0929    Lab Status: Final result Specimen: Blood from Arm, Left Updated: 12/24/24 0939     WBC 8.59 Thousand/uL      RBC 4.55 Million/uL      Hemoglobin 13.8 g/dL      Hematocrit 42.5 %      MCV 93 fL      MCH 30.3 pg      MCHC 32.5 g/dL      RDW 13.6 %      MPV 9.9 fL      Platelets 217 Thousands/uL      nRBC 0 /100 WBCs      Segmented % 67 %      Immature Grans % 0 %      Lymphocytes % 22 %      Monocytes % 8 %      Eosinophils Relative 2 %      Basophils Relative 1 %      Absolute Neutrophils 5.69 Thousands/µL      Absolute  Immature Grans 0.02 Thousand/uL      Absolute Lymphocytes 1.92 Thousands/µL      Absolute Monocytes 0.67 Thousand/µL      Eosinophils Absolute 0.21 Thousand/µL      Basophils Absolute 0.08 Thousands/µL     POCT Blood Gas (CG8+) [[id number]]  (Abnormal) Collected: 12/24/24 0931    Lab Status: Final result Specimen: Venous Updated: 12/24/24 0935     ph, David ISTAT 7.326     pCO2, David i-STAT 48.5 mm HG      pO2, David i-STAT 22.0 mm HG      BE, i-STAT -1 mmol/L      HCO3, David i-STAT 25.4 mmol/L      CO2, i-STAT 27 mmol/L      O2 Sat, i-STAT 33 %      SODIUM, I-STAT 142 mmol/l      Potassium, i-STAT 4.2 mmol/L      Calcium, Ionized i-STAT 1.23 mmol/L      Hct, i-STAT 41 %      Hgb, i-STAT 13.9 g/dl      Glucose, i-STAT 114 mg/dl      Specimen Type VENOUS    Rapid drug screen, urine [[id number]]     Lab Status: No result Specimen: Urine     UA (URINE) with reflex to Scope [[id number]]     Lab Status: No result Specimen: Urine     Fingerstick Glucose (POCT) [[id number]]  (Normal) Collected: 12/24/24 0915    Lab Status: Final result Specimen: Blood Updated: 12/24/24 0917     POC Glucose 127 mg/dl             CT head without contrast   Final Interpretation by Yaya Hernández MD (12/24 1026)      No acute intracranial abnormality.  Chronic microangiopathic changes. Chronic left parieto-occipital encephalomalacia. Prominent cortical atrophy as before.                  Workstation performed: [id number]         XR chest 1 view portable   Final Interpretation by Mae Franco MD (12/24 0946)      No acute cardiopulmonary disease.      No acute displaced fractures.      Workstation performed: [id number]         XR foot 3+ views RIGHT   ED Interpretation by Myriam Leger MD (12/24 1040)   No acute fractures or dislocations      Final Interpretation by Gallito Hardy MD (12/24 1050)      Probable nondisplaced fracture of the first phalanx.      The study was marked in EPIC for immediate notification.         Computerized  Assisted Algorithm (CAA) may have been used to analyze all applicable images.         Workstation performed: [id number]             ECG 12 Lead Documentation Only    Date/Time: 12/24/2024 9:23 AM    Performed by: Myriam Leger MD  Authorized by: Myriam Leger MD    Indications / Diagnosis:  Altered mental status  ECG reviewed by me, the ED Provider: yes    Patient location:  ED  Previous ECG:     Previous ECG:  Compared to current    Comparison ECG info:  5/1/24 sinus rhythm with pvcs    Similarity:  Changes noted  Interpretation:     Interpretation: non-specific    Rate:     ECG rate:  68    ECG rate assessment: normal    Rhythm:     Rhythm: sinus rhythm    Ectopy:     Ectopy: none    QRS:     QRS axis:  Normal    QRS intervals:  Normal  Conduction:     Conduction: normal    T waves:     T waves: flattening      Flattening:  AVL and III      ED Medication and Procedure Management   Prior to Admission Medications   Prescriptions Last Dose Informant Patient Reported? Taking?   Amantadine HCl 100 MG tablet   Yes No   Sig: Take 100 mg by mouth 2 (two) times a day   acetaminophen (TYLENOL) 500 mg tablet  Child Yes No   Sig: Take 500 mg by mouth as needed for mild pain   albuterol (PROVENTIL HFA,VENTOLIN HFA) 90 mcg/act inhaler  Child Yes No   Patient not taking: Reported on 6/18/2024   aspirin 81 mg chewable tablet   Yes No   Sig: Chew 81 mg daily   atorvastatin (LIPITOR) 40 mg tablet  Child Yes No   Sig: Take 40 mg by mouth daily with breakfast   baclofen 5 MG TABS  Child No No   Sig: Take 5 mg by mouth 2 (two) times a day as needed for muscle spasms   donepezil (ARICEPT) 10 mg tablet  Child No No   Sig: Take 1 tablet (10 mg total) by mouth in the morning   levETIRAcetam (Keppra) 500 mg tablet  Child No No   Sig: Take 1 tab (500 mg) twice a day for 1 week, then take 2 tabs (1000 mg) twice a day.   lidocaine (LIDODERM) 5 %  Child Yes No   Sig: Apply 1 patch topically daily Remove & Discard  patch within 12 hours or as directed by MD   melatonin 1 mg  Child Yes No   Sig: Take 5 mg by mouth daily at bedtime   metoprolol succinate (TOPROL-XL) 50 mg 24 hr tablet   Yes No   Sig: Take 50 mg by mouth daily   mirtazapine (REMERON) 7.5 MG tablet   Yes No   Sig: Take 7.5 mg by mouth daily at bedtime   pantoprazole (PROTONIX) 40 mg tablet  Child No No   Sig: Take 1 tablet (40 mg total) by mouth daily   Patient taking differently: Take 20 mg by mouth daily   tamsulosin (FLOMAX) 0.4 mg  Child No No   Sig: Take 1 capsule (0.4 mg total) by mouth daily with dinner   ticagrelor (BRILINTA) 90 MG  Child No No   Sig: Take 1 tablet (90 mg total) by mouth every 12 (twelve) hours   traZODone (DESYREL) 50 mg tablet   Yes No   Si tablet Orally nightly for 90 days      Facility-Administered Medications: None     Current Discharge Medication List        CONTINUE these medications which have NOT CHANGED    Details   acetaminophen (TYLENOL) 500 mg tablet Take 500 mg by mouth as needed for mild pain      albuterol (PROVENTIL HFA,VENTOLIN HFA) 90 mcg/act inhaler       Amantadine HCl 100 MG tablet Take 100 mg by mouth 2 (two) times a day      aspirin 81 mg chewable tablet Chew 81 mg daily      atorvastatin (LIPITOR) 40 mg tablet Take 40 mg by mouth daily with breakfast      baclofen 5 MG TABS Take 5 mg by mouth 2 (two) times a day as needed for muscle spasms  Refills: 0    Associated Diagnoses: Muscle spasms of both lower extremities      donepezil (ARICEPT) 10 mg tablet Take 1 tablet (10 mg total) by mouth in the morning  Qty: 60 tablet, Refills: 2    Associated Diagnoses: Memory loss      levETIRAcetam (Keppra) 500 mg tablet Take 1 tab (500 mg) twice a day for 1 week, then take 2 tabs (1000 mg) twice a day.  Qty: 360 tablet, Refills: 3    Associated Diagnoses: Focal epilepsy (HCC)      lidocaine (LIDODERM) 5 % Apply 1 patch topically daily Remove & Discard patch within 12 hours or as directed by MD      melatonin 1 mg Take 5 mg  by mouth daily at bedtime      metoprolol succinate (TOPROL-XL) 50 mg 24 hr tablet Take 50 mg by mouth daily      mirtazapine (REMERON) 7.5 MG tablet Take 7.5 mg by mouth daily at bedtime      pantoprazole (PROTONIX) 40 mg tablet Take 1 tablet (40 mg total) by mouth daily  Qty: 90 tablet, Refills: 0    Associated Diagnoses: GERD (gastroesophageal reflux disease)      tamsulosin (FLOMAX) 0.4 mg Take 1 capsule (0.4 mg total) by mouth daily with dinner  Qty: 90 capsule, Refills: 3    Associated Diagnoses: Urinary retention      ticagrelor (BRILINTA) 90 MG Take 1 tablet (90 mg total) by mouth every 12 (twelve) hours  Qty: 60 tablet, Refills: 3    Associated Diagnoses: Carotid stenosis, bilateral      traZODone (DESYREL) 50 mg tablet 1 tablet Orally nightly for 90 days           No discharge procedures on file.  ED SEPSIS DOCUMENTATION   Time reflects when diagnosis was documented in both MDM as applicable and the Disposition within this note       Time User Action Codes Description Comment    12/24/2024 11:01 AM Myriam Leger [S92.424A] Closed nondisplaced fracture of distal phalanx of right great toe, initial encounter     12/24/2024 12:53 PM Myriam Leger [R41.82] Altered mental status                  Myriam Leger MD  12/24/24 6416

## 2024-12-24 NOTE — CONSULTS
"Consultation - Neurology   Name: Constanza Zhu 64 y.o. male I MRN: [id number]  Unit/Bed#: -01 I Date of Admission: 12/24/2024   Date of Service: 12/24/2024 I Hospital Day: 0   Consult to neurology  Consult performed by: Sobia Joseph PA-C  Consult ordered by: Myriam Leger MD        Physician Requesting Evaluation: Estela Freed MD   Reason for Evaluation / Principal Problem: AMS     Assessment & Plan  Altered mental status  64 y.o.  male with recurrent strokes (likely atheroembolic in setting of multiple stenosis), b/l carotid stenosis s/p L ICA stenting 5/2023 and s/p R carotid stenting 5/2024, iliac artery stenosis, focal epilepsy, anxiety and depression, HTN, HLD,  who presents to [facility] on 12/24/24 with altered mental status.     Workup  - CTH wo contrast 12/24/24:  \"No acute intracranial abnormality. Chronic microangiopathic changes. Chronic left parieto-occipital encephalomalacia. Prominent cortical atrophy as before.\"    Plan  - continue home Keppra 1500 mg BID  - continue home ASA, Brilinta, statin   - avoid hypotension   - goal euglycemia, normothermia   - continue to monitor neurologic status closely. STAT CTH for acute change in neurologic status. Please contact neurology immediately with acute change in neurologic status  - Medical management and correction of metabolic and infectious disturbances per primary team   - Avoid CNS altering medications if possible  - Continue supportive care   - Continue to monitor neurologic status. Notify neurology with any changes in exam.       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 They will not require outpatient neurological testing.  Message sent to outpatient schedulers.     Case and plan discussed with attending neurologist.  Please see attending attestation for any further recommendations and/or changes to " plan.      Recommendations for outpatient neurological follow up have yet to be determined.    History of Present Illness   Constanza Zhu is a 64 y.o.  male with recurrent strokes (likely atheroembolic in setting of multiple stenosis), b/l carotid stenosis s/p L ICA stenting 5/2023 and s/p R carotid stenting 5/2024, iliac artery stenosis, focal epilepsy, anxiety and depression, HTN, HLD,  who presents to [facility] on 12/24/24 with altered mental status.     Per ED notes: pt became agitated around 7 AM 12/24. Then, around 0815, pt became unresponsive. Pt intermittently arousable to sternal rub for EMS. Pt recently discontinued Keppra.     Pt unable to provide history. Pt reports he does not remember why he was brought to the hospital.     Pt's wife, Faiza, in room and able to supplement history. Per pt's wife:   Pt went from the bed to his recliner around 3 AM this morning which is out of character for him but not completely abnormal for him. At 7 AM, pt went to give pt his medications, and he had a complete outburst. Pt screamed that he wanted to leave, he did not want to do therapy anymore, he wanted to die (primary team provider in room during this part of the conversation and aware), threw his medications all over the place, and was banging his walker aggressively. Pt's wife let pt calm down and came back to check on the pt around 8 AM. At that time, pt was calm, took his morning medications, including his Keppra 1500 mg, and took a few bites of breakfast. Pt's wife asked him if he wanted any more to eat, but pt stated he was not hungry. Pt's wife told pt they needed to start his shower and pt suddenly lost consciousness. Pt was tense but without any shaking activity. His eyes were closed. Pt's wife reports this was similar [facility] Pt's wife called EMS. When EMS was there, pt did not become fully responsive until he was moved onto the rney. At time of  neurology evaluation, pt's wife states pt's neurologic exam is his baseline but that he is more tired than usual.     Previous Neurologic History:   Pt follows with [facility] neurology as an outpatient, last seen by Dr. Steele on 12/10/24. At that time, pt was seen in follow-up for strokes. Pt reported doing well, working with therapy, and ambulating with a walker. Pt's speech improving, and pt is able to better comprehend better. Fluency and some word substitution reportedly still an issue at times. Pt maintained on ASA, Brilinta, and atorvastatin.   Pt also follows with epilepsy team as an outpatient, last evaluated by ERIC Mcclelland on 12/12/23. At that time, pt noted to have focal epilepsy in the setting of b/l MCA strokes. Pt taking Keppra 1g BID without recurrence of episodes concerning for seizure. Pt denied zoning out episodes since being on Keppra. Pt had a routine EEG in 7/2023 that showed polymorphic delta activity over the L frontotemporal region. A repeat EEG was recommended but not completed. Pt recommended to f/u with Dr. Brasher in 4 months.   Pt recently hospitalized at [facility] in late August to Early September 2024 due to staring episode while out with family. Pt reportedly more confused and slower to respond than normal. MRI brain showed acute to subacute infarct in the L occipital region. Neurology consulted and with concern that the staring episode was due to break through seizure. Neurology increased maintenance Keppra to 1500 mg BID. Neurology felt the new infarct was likely watershed. While in the hospital, pt with multiple episodes of unconsciousness that lasted 30 seconds to 1 minute. During the first episode, . Neurology and cardiology consulted. Pt underwent 24 hour EEG during admission that did not demonstrate seizure activity. Consult services felt episodes were likely related to poor perfusion to the brain due to extensive stenosis in the cerebral arteries and drop in his  normal BP. Recommendation was to keep -160. Pt did not have any more unconscious episodes with maintenance of higher SBP. Pt continued on ASA, Brilinta, and statin.     Review of Systems   Neurological:  Negative for weakness, numbness and headaches.        I have reviewed the patient's PMH, PSH, Social History, Family History, Meds, and Allergies  Historical Information   Past Medical History:   Diagnosis Date    Depression     Diabetes mellitus (HCC)     patient denies    Dyslipidemia 03/26/2019    GERD (gastroesophageal reflux disease)     Hyperlipidemia     Hypertension     Prediabetes     Prediabetes 04/03/2019    Stroke (HCC)      Past Surgical History:   Procedure Laterality Date    ARTHROSCOPIC REPAIR ACL Left     BACK SURGERY      twice    CARPAL TUNNEL RELEASE Right     IR CEREBRAL ANGIOGRAPHY  05/04/2023    IR STROKE ALERT  03/19/2019    SHOULDER SURGERY Left     US GUIDED THYROID BIOPSY  11/21/2023     Social History     Tobacco Use    Smoking status: Former    Smokeless tobacco: Never   Vaping Use    Vaping status: Never Used   Substance and Sexual Activity    Alcohol use: Never    Drug use: Never    Sexual activity: Not Currently     E-Cigarette/Vaping    E-Cigarette Use Never User      E-Cigarette/Vaping Substances    Nicotine No     THC No     CBD No     Flavoring No     Other No     Unknown No      Family History   Problem Relation Age of Onset    Hyperlipidemia Mother     Hypertension Mother     Diabetes unspecified Mother         prediabetes    Heart attack Mother     Diabetes Mother     Hyperlipidemia Father     Hypertension Father     Prostate cancer Father     Stroke Father     Hyperlipidemia Sister     Hypertension Sister     Hyperlipidemia Sister     Hypertension Sister     Depression Sister     Hypertension Sister     Hyperlipidemia Sister     Depression Sister     Hyperlipidemia Brother     Hypertension Brother     Hypertension Brother     Prostate cancer Brother     Hypothyroidism  Daughter     Hypothyroidism Son     Diabetes unspecified Son     Seizures Neg Hx      Social History     Tobacco Use    Smoking status: Former    Smokeless tobacco: Never   Vaping Use    Vaping status: Never Used   Substance and Sexual Activity    Alcohol use: Never    Drug use: Never    Sexual activity: Not Currently     No current facility-administered medications for this encounter.  Prior to Admission Medications   Prescriptions Last Dose Informant Patient Reported? Taking?   Amantadine HCl 100 MG tablet 12/24/2024 Morning  Yes Yes   Sig: Take 100 mg by mouth 2 (two) times a day   acetaminophen (TYLENOL) 500 mg tablet Unknown Child Yes No   Sig: Take 500 mg by mouth as needed for mild pain   albuterol (PROVENTIL HFA,VENTOLIN HFA) 90 mcg/act inhaler Not Taking Child Yes No   Patient not taking: Reported on 6/18/2024   aspirin 81 mg chewable tablet 12/24/2024 Morning  Yes Yes   Sig: Chew 81 mg daily   atorvastatin (LIPITOR) 40 mg tablet 12/23/2024 Evening Child Yes Yes   Sig: Take 40 mg by mouth daily with breakfast   baclofen 5 MG TABS 12/24/2024 Morning Child No Yes   Sig: Take 5 mg by mouth 2 (two) times a day as needed for muscle spasms   donepezil (ARICEPT) 10 mg tablet 12/23/2024 Evening Child No Yes   Sig: Take 1 tablet (10 mg total) by mouth in the morning   levETIRAcetam (Keppra) 500 mg tablet 12/24/2024 Morning Child, Spouse/Significant Other No Yes   Sig: Take 1 tab (500 mg) twice a day for 1 week, then take 2 tabs (1000 mg) twice a day.   Patient taking differently: Patient takes 3 in the morning and 3 at night   lidocaine (LIDODERM) 5 % Past Month Child Yes Yes   Sig: Apply 1 patch topically daily Remove & Discard patch within 12 hours or as directed by MD   melatonin 1 mg 12/23/2024 Evening Child Yes Yes   Sig: Take 5 mg by mouth daily at bedtime   metoprolol succinate (TOPROL-XL) 50 mg 24 hr tablet 12/24/2024 Morning  Yes Yes   Sig: Take 50 mg by mouth daily   mirtazapine (REMERON) 7.5 MG tablet  2024 Morning  Yes Yes   Sig: Take 7.5 mg by mouth daily at bedtime   pantoprazole (PROTONIX) 40 mg tablet 2024 Morning Spouse/Significant Other No Yes   Sig: Take 1 tablet (40 mg total) by mouth daily   Patient taking differently: Take 20 mg by mouth daily   tamsulosin (FLOMAX) 0.4 mg 2024 Evening Child No Yes   Sig: Take 1 capsule (0.4 mg total) by mouth daily with dinner   ticagrelor (BRILINTA) 90 MG 2024 Morning Child No Yes   Sig: Take 1 tablet (90 mg total) by mouth every 12 (twelve) hours   traZODone (DESYREL) 50 mg tablet 2024 Evening  Yes Yes   Si tablet Orally nightly for 90 days      Facility-Administered Medications: None     Flexeril [cyclobenzaprine], Lisinopril, and Nsaids    Objective :  Temp:  [98.1 °F (36.7 °C)-98.3 °F (36.8 °C)] 98.3 °F (36.8 °C)  HR:  [58-70] 70  BP: (127-172)/(57-81) 172/81  Resp:  [14-20] 20  SpO2:  [98 %-100 %] 98 %  O2 Device: None (Room air)    Physical Exam  Vitals and nursing note reviewed.   HENT:      Head: Normocephalic and atraumatic.   Eyes:      Extraocular Movements: Extraocular movements intact.   Cardiovascular:      Rate and Rhythm: Normal rate.   Pulmonary:      Effort: Pulmonary effort is normal.   Neurological:      Mental Status: He is alert.     Neurological Exam  Mental Status  Alert. Able to name objects. With some word substitution issues. . Follows two-step commands. Appendicular commands. Language: Paraphasic errors noted . Attention and concentration: Appropriately attends to provider .    - oriented to self   - oriented to season, Winter  - oriented to month when given 3 options  - not oriented to year   - able to name wife. .    Cranial Nerves  CN III, IV, VI: Extraocular movements intact bilaterally.  CN V: Facial sensation is normal.  CN VII: Full and symmetric facial movement.  CN XII: Tongue midline without atrophy or fasciculations.    - R field cut (most notable R eye temporal vision), per pt's wife this is from  "previous stroke  - hearing grossly intact .    Motor                                               Right                     Left   Shoulder abduction               5                          5  Elbow flexion                         5                          5  Elbow extension                    5                          5  Finger flexion                         5                          5  Hip flexion                              5                          5  Plantarflexion                         5                          5  Dorsiflexion                            5                          5  - slight decreased ROM R dorsiflexion .    Sensory  Light touch is normal in upper and lower extremities.     - with 2 point stimulation, can feel touch on R leg but not L arm .    Coordination      - no gross ataxia with finger to nose testing b/l .        Lab Results: I have reviewed the following results:I have personally reviewed pertinent reports.  , CBC:   Results from last 7 days   Lab Units 12/24/24 0931 12/24/24 0929   WBC Thousand/uL  --  8.59   RBC Million/uL  --  4.55   HEMOGLOBIN g/dL  --  13.8   I STAT HEMOGLOBIN g/dl 13.9  --    HEMATOCRIT %  --  42.5   HEMATOCRIT, ISTAT % 41  --    MCV fL  --  93   PLATELETS Thousands/uL  --  217   , BMP/CMP:   Results from last 7 days   Lab Units 12/24/24 0931 12/24/24 0929   SODIUM mmol/L  --  139   POTASSIUM mmol/L  --  4.3   CHLORIDE mmol/L  --  108   CO2 mmol/L  --  28   CO2, I-STAT mmol/L 27  --    BUN mg/dL  --  26*   CREATININE mg/dL  --  1.11   GLUCOSE, ISTAT mg/dl 114  --    CALCIUM mg/dL  --  8.5   AST U/L  --  14   ALT U/L  --  15   ALK PHOS U/L  --  105*   EGFR ml/min/1.73sq m  --  69   , Vitamin B12:   , HgBA1C:   , TSH:   Results from last 7 days   Lab Units 12/24/24  0929   TSH 3RD GENERATON uIU/mL 1.954   , Coagulation:   , Lipid Profile:   , Ammonia:   , Urinalysis:       Invalid input(s): \"URIBILINOGEN\", Drug Screen:   , Medication Drug Levels:   " "    Invalid input(s): \"CARBAMAZEPINE\", \"OXCARBAZEPINE\"  Recent Labs     12/24/24  0929 12/24/24  0931   WBC 8.59  --    HGB 13.8 13.9   HCT 42.5 41     --    SODIUM 139  --    K 4.3  --      --    CO2 28 27   BUN 26*  --    CREATININE 1.11  --    GLUC 116  --    CAIONIZED  --  1.23     Imaging Results Review: I personally reviewed the following image studies in PACS and associated radiology reports: CT head. My interpretation of the radiology images/reports is: no acute intracranial abnormality.  Other Study Results Review: EKG was reviewed.     This note was completed in part utilizing Dragon Software.  Grammatical errors, random word insertions, spelling mistakes, and incomplete sentences may be an occasional consequence of this system secondary to software limitations, ambient noise, and hardware issues.  If you have any questions or concerns about the content, text, or information contained within the body of this dictation, please contact the provider for clarification.     "

## 2024-12-24 NOTE — ASSESSMENT & PLAN NOTE
Patient presents with acute agitation followed by loss of consciousness, increased extremity tone  So far VSS. Labs remarkable for elevated creatinine from baseline, CXR shows no active disease, CT brain is negative, VBG does not suggest hypercarbia/hypoxemia  Patient received 1000mg IV keppra in ER  Patient to be admitted for further management of AMS.   Differentials include breakthrough seizure, acute cva/tia, cerebral hypoperfusion  Neuro checks q4 hrs  Continue with antiepileptics, keppra level was sent prior to admission  Check Urinalysis, utox, COVID/RSV  Hold sedating medications  Neurology consulted for further management   Fall and seizure precautions  Ativan prn seizure like activity   Speech eval  NPO for now unless passes dysphagia screen   PT/OT

## 2024-12-24 NOTE — ASSESSMENT & PLAN NOTE
Lab Results   Component Value Date    HGBA1C 5.5 09/01/2024       Recent Labs     12/24/24  0915   POCGLU 127       Blood Sugar Average: Last 72 hrs:  (P) 127  Monitor fingersticks, ISS as needed

## 2024-12-24 NOTE — H&P
H&P - Hospitalist   Name: Constanza Zhu 64 y.o. male I MRN: [id number]  Unit/Bed#: -01 I Date of Admission: 12/24/2024   Date of Service: 12/24/2024 I Hospital Day: 0     Assessment & Plan  Altered mental status  Patient presents with acute agitation followed by loss of consciousness, increased extremity tone  So far VSS. Labs remarkable for elevated creatinine from baseline, CXR shows no active disease, CT brain is negative, VBG does not suggest hypercarbia/hypoxemia  Patient received 1000mg IV keppra in ER  Patient to be admitted for further management of AMS.   Differentials include breakthrough seizure, acute cva/tia, cerebral hypoperfusion  Neuro checks q4 hrs  Continue with antiepileptics, keppra level was sent prior to admission  Check Urinalysis, utox, COVID/RSV  Hold sedating medications  Neurology consulted for further management   Fall and seizure precautions  Ativan prn seizure like activity   Speech eval  NPO for now unless passes dysphagia screen   PT/OT   History of stroke  CT brain negative  Continue with aspirin, brilinta, lipitor  Type 2 diabetes mellitus with other diabetic ophthalmic complication (HCC)  Lab Results   Component Value Date    HGBA1C 5.5 09/01/2024       Recent Labs     12/24/24  0915   POCGLU 127       Blood Sugar Average: Last 72 hrs:  (P) 127  Monitor fingersticks, ISS as needed  Primary hypertension  BP currently at goal  Continue with metoprolol 50mg daily   GERD (gastroesophageal reflux disease)  Continue with PPI   Focal epilepsy (HCC)    Closed nondisplaced fracture of phalanx of right great toe  Likely occurred in agitated state.   Currently non-tender to palpation   Will get podiatry input       VTE Pharmacologic Prophylaxis:   Moderate Risk (Score 3-4) - Pharmacological DVT Prophylaxis Ordered: enoxaparin (Lovenox).  Code Status: Level 1 - Full Code   Discussion with family: Updated  (wife) at bedside.    Anticipated Length of Stay: Patient will be  admitted on an inpatient basis with an anticipated length of stay of greater than 2 midnights secondary to above.    History of Present Illness   Chief Complaint: JOSHUA Zhu is a 64 y.o. male with a PMH of epilepsy, hypertension, prediabetes, CVA, diabetes mellitus complicated by retinopathy, mood disorder presenting with complaints of altered mental status.  Patient is alert and oriented only to name and year, history mainly obtained from wife Faiza at bedside.  Patient was in his usual state of health up until this morning when he suddenly became agitated as he was about to take his pills.  Patient suddenly flung the pills, got up and was stomping his walker on the floor.  In the process he also mentioned wanting to ' die' and stop therapy.  He started to walk towards the door however his wife was able to redirect him to the chair and settle him down and take his pills.  A few minutes later patient suddenly became unresponsive.  His eyes were closed and he was not responding to voice or shaking.  She tried holding his extremities but noted he was very ' stiff'.  There was no other seizure-like activity noted such as urine or bowel incontinence, shaking or eyelid flickering.  She called the ambulance which took about 10 minutes and at that point patient was placed on the gurney and sat up, was responding to commands.  Per EMS examination, there was a concern for pinpoint pupils bilaterally.  His wife states patient had a rough night, woke up at 3 AM to sit in his recliner which she does often when he is upset about something.  He did have a mild cough over the last few days but no fevers, chills reported.  Last week patient sustained a fall while working with a caregiver as he was getting to the bathroom.  He denied striking his head.  Patient has no missed medications recently.  He was seen by the neurologist on 12/10, was doing well except for elevated blood pressure for which patient was sent a  prescription for hydrochlorothiazide.  Patient does not smoke or drink alcohol at this time.    In ER, temperature 98.1 F, heart rate 60, blood pressure 165/67, O2 sat 98%  Labs show sodium 139, BUN 26, creatinine 1.11, alk phos 105, troponin 8 > 8, lactic acid 1.1  EKG showed normal sinus rhythm with sinus arrhythmia  CT brain showed no acute intracranial abnormality, chronic microangiopathic changes, chronic left parietal bicipital encephalomalacia, prominent cortical atrophy    Case was discussed with neurologist with recommendation for loading dose of Keppra 1000 mg once now and admission for further management of altered mental status    Review of Systems   Unable to perform ROS: Mental status change   Constitutional:  Negative for chills and fever.   HENT:  Negative for ear pain and sore throat.    Eyes:  Negative for pain and visual disturbance.   Respiratory:  Negative for cough and shortness of breath.    Cardiovascular:  Negative for chest pain and palpitations.   Gastrointestinal:  Negative for abdominal pain and vomiting.   Genitourinary:  Negative for dysuria and hematuria.   Musculoskeletal:  Negative for arthralgias and back pain.   Skin:  Negative for color change and rash.   Neurological:  Negative for dizziness, seizures, syncope and headaches.   All other systems reviewed and are negative.      Historical Information   Past Medical History:   Diagnosis Date    Depression     Diabetes mellitus (HCC)     patient denies    Dyslipidemia 03/26/2019    GERD (gastroesophageal reflux disease)     Hyperlipidemia     Hypertension     Prediabetes     Prediabetes 04/03/2019    Stroke (HCC)      Past Surgical History:   Procedure Laterality Date    ARTHROSCOPIC REPAIR ACL Left     BACK SURGERY      twice    CARPAL TUNNEL RELEASE Right     IR CEREBRAL ANGIOGRAPHY  05/04/2023    IR STROKE ALERT  03/19/2019    SHOULDER SURGERY Left     US GUIDED THYROID BIOPSY  11/21/2023     Social History     Tobacco Use     Smoking status: Former    Smokeless tobacco: Never   Vaping Use    Vaping status: Never Used   Substance and Sexual Activity    Alcohol use: Never    Drug use: Never    Sexual activity: Not Currently     E-Cigarette/Vaping    E-Cigarette Use Never User      E-Cigarette/Vaping Substances    Nicotine No     THC No     CBD No     Flavoring No     Other No     Unknown No      Family History   Problem Relation Age of Onset    Hyperlipidemia Mother     Hypertension Mother     Diabetes unspecified Mother         prediabetes    Heart attack Mother     Diabetes Mother     Hyperlipidemia Father     Hypertension Father     Prostate cancer Father     Stroke Father     Hyperlipidemia Sister     Hypertension Sister     Hyperlipidemia Sister     Hypertension Sister     Depression Sister     Hypertension Sister     Hyperlipidemia Sister     Depression Sister     Hyperlipidemia Brother     Hypertension Brother     Hypertension Brother     Prostate cancer Brother     Hypothyroidism Daughter     Hypothyroidism Son     Diabetes unspecified Son     Seizures Neg Hx      Social History:  Marital Status: /Civil Union       Meds/Allergies   I have reviewed home medications with patient family member.  Prior to Admission medications    Medication Sig Start Date End Date Taking? Authorizing Provider   Amantadine HCl 100 MG tablet Take 100 mg by mouth 2 (two) times a day   Yes Historical Provider, MD   aspirin 81 mg chewable tablet Chew 81 mg daily   Yes Historical Provider, MD   atorvastatin (LIPITOR) 40 mg tablet Take 40 mg by mouth daily with breakfast   Yes Historical Provider, MD   baclofen 5 MG TABS Take 5 mg by mouth 2 (two) times a day as needed for muscle spasms 6/5/23  Yes Alexis Silvestre MD   donepezil (ARICEPT) 10 mg tablet Take 1 tablet (10 mg total) by mouth in the morning 6/13/23  Yes Maddie Steele MD   levETIRAcetam (Keppra) 500 mg tablet Take 1 tab (500 mg) twice a day for 1 week, then take 2 tabs (1000 mg)  twice a day.  Patient taking differently: Patient takes 3 in the morning and 3 at night 6/3/24  Yes Glenn Mattson MD   lidocaine (LIDODERM) 5 % Apply 1 patch topically daily Remove & Discard patch within 12 hours or as directed by MD   Yes Historical Provider, MD   melatonin 1 mg Take 5 mg by mouth daily at bedtime   Yes Historical Provider, MD   metoprolol succinate (TOPROL-XL) 50 mg 24 hr tablet Take 50 mg by mouth daily   Yes Historical Provider, MD   mirtazapine (REMERON) 7.5 MG tablet Take 7.5 mg by mouth daily at bedtime   Yes Historical Provider, MD   pantoprazole (PROTONIX) 40 mg tablet Take 1 tablet (40 mg total) by mouth daily  Patient taking differently: Take 20 mg by mouth daily 2/19/24  Yes Landen Pena MD   tamsulosin (FLOMAX) 0.4 mg Take 1 capsule (0.4 mg total) by mouth daily with dinner 11/15/23  Yes Diana Isabel PA-C   ticagrelor (BRILINTA) 90 MG Take 1 tablet (90 mg total) by mouth every 12 (twelve) hours 11/21/23  Yes Maddie Steele MD   traZODone (DESYREL) 50 mg tablet 1 tablet Orally nightly for 90 days 9/12/24  Yes Historical Provider, MD   acetaminophen (TYLENOL) 500 mg tablet Take 500 mg by mouth as needed for mild pain    Historical Provider, MD   albuterol (PROVENTIL HFA,VENTOLIN HFA) 90 mcg/act inhaler  2/1/24   Historical Provider, MD     Allergies   Allergen Reactions    Flexeril [Cyclobenzaprine] Drowsiness    Lisinopril Cough    Nsaids Confusion       Objective :  Temp:  [98.1 °F (36.7 °C)-98.3 °F (36.8 °C)] 98.3 °F (36.8 °C)  HR:  [58-70] 70  BP: (127-172)/(57-81) 172/81  Resp:  [14-20] 20  SpO2:  [98 %-100 %] 98 %  O2 Device: None (Room air)    Physical Exam  Vitals and nursing note reviewed.   Constitutional:       General: He is not in acute distress.     Appearance: He is well-developed. He is ill-appearing. He is not toxic-appearing.   HENT:      Head: Normocephalic and atraumatic.      Nose: No rhinorrhea.   Eyes:      General: No scleral icterus.      Conjunctiva/sclera: Conjunctivae normal.   Cardiovascular:      Rate and Rhythm: Normal rate and regular rhythm.      Heart sounds: No murmur heard.  Pulmonary:      Effort: Pulmonary effort is normal. No respiratory distress.      Breath sounds: Normal breath sounds. No wheezing, rhonchi or rales.   Abdominal:      General: Bowel sounds are normal. There is no distension.      Palpations: Abdomen is soft. There is no mass.      Tenderness: There is no abdominal tenderness.   Musculoskeletal:         General: No swelling.      Cervical back: Neck supple. No tenderness.      Right lower leg: No edema.      Left lower leg: No edema.        Feet:    Feet:      Comments: Laceration present on base of right dorsal hallux. Surroundign bluish discoloration. Non tender to palpation.   Skin:     General: Skin is warm and dry.      Capillary Refill: Capillary refill takes less than 2 seconds.      Findings: Bruising and lesion present. No erythema.   Neurological:      General: No focal deficit present.      Mental Status: He is lethargic and disoriented.      Sensory: Sensation is intact.      Motor: No weakness.   Psychiatric:         Mood and Affect: Mood normal.         Behavior: Behavior is cooperative.          Lines/Drains:            Lab Results: I have reviewed the following results:  Results from last 7 days   Lab Units 12/24/24  0931 12/24/24  0929   WBC Thousand/uL  --  8.59   HEMOGLOBIN g/dL  --  13.8   I STAT HEMOGLOBIN g/dl 13.9  --    HEMATOCRIT %  --  42.5   HEMATOCRIT, ISTAT % 41  --    PLATELETS Thousands/uL  --  217   SEGS PCT %  --  67   LYMPHO PCT %  --  22   MONO PCT %  --  8   EOS PCT %  --  2     Results from last 7 days   Lab Units 12/24/24  0931 12/24/24  0929   SODIUM mmol/L  --  139   POTASSIUM mmol/L  --  4.3   CHLORIDE mmol/L  --  108   CO2 mmol/L  --  28   CO2, I-STAT mmol/L 27  --    BUN mg/dL  --  26*   CREATININE mg/dL  --  1.11   ANION GAP mmol/L  --  3*   CALCIUM mg/dL  --  8.5   ALBUMIN  g/dL  --  3.8   TOTAL BILIRUBIN mg/dL  --  0.48   ALK PHOS U/L  --  105*   ALT U/L  --  15   AST U/L  --  14   GLUCOSE RANDOM mg/dL  --  116         Results from last 7 days   Lab Units 12/24/24  0915   POC GLUCOSE mg/dl 127     Lab Results   Component Value Date    HGBA1C 5.5 09/01/2024    HGBA1C 5.7 05/13/2024    HGBA1C 5.3 04/14/2024     Results from last 7 days   Lab Units 12/24/24  0929   LACTIC ACID mmol/L 1.1       Imaging Results Review: I reviewed radiology reports from this admission including: chest xray and CT head.  Other Study Results Review: No additional pertinent studies reviewed.    Administrative Statements   I have spent a total time of 65 minutes in caring for this patient on the day of the visit/encounter including Instructions for management.    ** Please Note: This note has been constructed using a voice recognition system. **

## 2024-12-24 NOTE — ASSESSMENT & PLAN NOTE
"64 y.o.  male with recurrent strokes (likely atheroembolic in setting of multiple stenosis), b/l carotid stenosis s/p L ICA stenting 5/2023 and s/p R carotid stenting 5/2024, iliac artery stenosis, focal epilepsy, anxiety and depression, HTN, HLD,  who presents to [facility] on 12/24/24 with altered mental status.     Workup  - CTH wo contrast 12/24/24:  \"No acute intracranial abnormality. Chronic microangiopathic changes. Chronic left parieto-occipital encephalomalacia. Prominent cortical atrophy as before.\"    Plan  - continue home Keppra 1500 mg BID  - continue home ASA, Brilinta, statin   - avoid hypotension   - goal euglycemia, normothermia   - continue to monitor neurologic status closely. STAT CTH for acute change in neurologic status. Please contact neurology immediately with acute change in neurologic status  - Medical management and correction of metabolic and infectious disturbances per primary team   - Avoid CNS altering medications if possible  - Continue supportive care   - Continue to monitor neurologic status. Notify neurology with any changes in exam.     "

## 2024-12-24 NOTE — TELEPHONE ENCOUNTER
STILL ADMITTED:12/24/2024 - present  [facility]      1ST ATTEMPT,     VIA [employer]     Thank you,     Rosi NEWMAN/ [facility]/ ALTERED MENTAL STATUS    ----- Message from Sobia Joseph PA-C sent at 12/24/2024  2:29 PM EST -----    Constanza Zhu will need follow-up in in 4 weeks with epilepsy team for Localization-related epilepsy due to established diagnosis of brain lesion in 60 minute appointment. They will not require outpatient neurological testing.

## 2024-12-25 LAB
EST. AVERAGE GLUCOSE BLD GHB EST-MCNC: 117 MG/DL
GLUCOSE SERPL-MCNC: 111 MG/DL (ref 65–140)
GLUCOSE SERPL-MCNC: 113 MG/DL (ref 65–140)
GLUCOSE SERPL-MCNC: 78 MG/DL (ref 65–140)
GLUCOSE SERPL-MCNC: 85 MG/DL (ref 65–140)
GLUCOSE SERPL-MCNC: 87 MG/DL (ref 65–140)
HBA1C MFR BLD: 5.7 %

## 2024-12-25 PROCEDURE — 82948 REAGENT STRIP/BLOOD GLUCOSE: CPT

## 2024-12-25 PROCEDURE — 99221 1ST HOSP IP/OBS SF/LOW 40: CPT | Performed by: PODIATRIST

## 2024-12-25 PROCEDURE — 99232 SBSQ HOSP IP/OBS MODERATE 35: CPT | Performed by: FAMILY MEDICINE

## 2024-12-25 PROCEDURE — 99232 SBSQ HOSP IP/OBS MODERATE 35: CPT | Performed by: PSYCHIATRY & NEUROLOGY

## 2024-12-25 RX ORDER — INSULIN LISPRO 100 [IU]/ML
1-5 INJECTION, SOLUTION INTRAVENOUS; SUBCUTANEOUS EVERY 6 HOURS SCHEDULED
Status: DISCONTINUED | OUTPATIENT
Start: 2024-12-26 | End: 2024-12-25

## 2024-12-25 RX ORDER — INSULIN LISPRO 100 [IU]/ML
1-5 INJECTION, SOLUTION INTRAVENOUS; SUBCUTANEOUS EVERY 6 HOURS SCHEDULED
Status: DISCONTINUED | OUTPATIENT
Start: 2024-12-26 | End: 2024-12-26

## 2024-12-25 RX ORDER — HYDROCHLOROTHIAZIDE 25 MG/1
25 TABLET ORAL DAILY
Status: DISCONTINUED | OUTPATIENT
Start: 2024-12-25 | End: 2024-12-27 | Stop reason: HOSPADM

## 2024-12-25 RX ADMIN — Medication 5 MG: at 21:06

## 2024-12-25 RX ADMIN — ACETAMINOPHEN 650 MG: 325 TABLET, FILM COATED ORAL at 20:14

## 2024-12-25 RX ADMIN — LEVETIRACETAM 1500 MG: 500 TABLET, FILM COATED ORAL at 20:13

## 2024-12-25 RX ADMIN — AMANTADINE HYDROCHLORIDE 100 MG: 100 CAPSULE ORAL at 17:31

## 2024-12-25 RX ADMIN — TICAGRELOR 90 MG: 90 TABLET ORAL at 20:14

## 2024-12-25 RX ADMIN — MIRTAZAPINE 7.5 MG: 15 TABLET, FILM COATED ORAL at 21:06

## 2024-12-25 RX ADMIN — HYDROCHLOROTHIAZIDE 25 MG: 25 TABLET ORAL at 15:30

## 2024-12-25 RX ADMIN — ATORVASTATIN CALCIUM 40 MG: 40 TABLET, FILM COATED ORAL at 08:44

## 2024-12-25 RX ADMIN — TICAGRELOR 90 MG: 90 TABLET ORAL at 08:44

## 2024-12-25 RX ADMIN — ENOXAPARIN SODIUM 40 MG: 40 INJECTION SUBCUTANEOUS at 08:45

## 2024-12-25 RX ADMIN — TAMSULOSIN HYDROCHLORIDE 0.4 MG: 0.4 CAPSULE ORAL at 15:30

## 2024-12-25 RX ADMIN — METOPROLOL SUCCINATE 50 MG: 50 TABLET, EXTENDED RELEASE ORAL at 08:45

## 2024-12-25 RX ADMIN — LEVETIRACETAM 1500 MG: 500 TABLET, FILM COATED ORAL at 08:45

## 2024-12-25 RX ADMIN — PANTOPRAZOLE SODIUM 20 MG: 20 TABLET, DELAYED RELEASE ORAL at 08:45

## 2024-12-25 RX ADMIN — DONEPEZIL HYDROCHLORIDE 10 MG: 5 TABLET ORAL at 08:45

## 2024-12-25 RX ADMIN — ASPIRIN 81 MG CHEWABLE TABLET 81 MG: 81 TABLET CHEWABLE at 08:45

## 2024-12-25 RX ADMIN — AMANTADINE HYDROCHLORIDE 100 MG: 100 CAPSULE ORAL at 08:45

## 2024-12-25 NOTE — ASSESSMENT & PLAN NOTE
- Follows with [facility] neurology for presumed focal epilepsy in the setting of prior b/l MCA strokes  - Recently hospitalized at [facility] in late August/early Sept due to staring spells, increased confusion. MRI brain showed an acute to subacute L occpital stroke. Neurology was concerned that the staring spell was a breakthrough seizure. Keppra was increased from 1g BID to 1500 BID. While in the hospital, he had multiple episodes of unconsciousness lasting 30-60 seconds. 24 hr vEEG did not demonstrate any seizure activity. During one of the episodes, patient was hypotensive. Etiology thought to be hypoperfusion in setting of extensive intracranial stenosis.  - See plan above

## 2024-12-25 NOTE — CONSULTS
Consultation - Podiatry   Name: Constanza Zhu 64 y.o. male I MRN: [id number]  Unit/Bed#: -01 I Date of Admission: 12/24/2024   Date of Service: 12/25/2024 I Hospital Day: 0   Inpatient consult to Podiatry  Consult performed by: Meliton Lizarraga DPM  Consult ordered by: Macy Street MD        Physician Requesting Evaluation: Nanda Tse MD   Reason for Evaluation / Principal Problem: toe fracture    Assessment & Plan  Closed nondisplaced fracture of phalanx of right great toe  I personally reviewed the x-ray.  There is a possible nondisplaced oblique fracture at the proximal lateral aspect of the distal phalanx of the great toe.  My personal opinion is that this does not appear acute.  In addition patient has no pain or clinical symptoms.  I feel he can weight-bear as tolerated.  No further acute care.  There is some mild bruising which will resolve with time and rest      History of Present Illness   Constanza Zhu is a 64 y.o. male who presents with altered mental status.  Podiatry was consulted due to some bruising of his toe.  An x-ray showed possible fracture.  Patient states the toe does not hurt..    Review of Systems  As stated in HPI, otherwise normal    Medical History Reviewed by provider this encounter:  Tobacco  Allergies  Meds  Problems  Med Hx  Surg Hx  Fam Hx      I have reviewed the patient's PMH, PSH, Social History, Family History, Meds, and Allergies    Objective :  Temp:  [96.8 °F (36 °C)-98.3 °F (36.8 °C)] 96.8 °F (36 °C)  HR:  [58-77] 77  BP: (144-173)/(65-81) 173/80  Resp:  [14-20] 20  SpO2:  [94 %-100 %] 96 %  O2 Device: None (Room air)    Physical Exam  Vitals reviewed.   Cardiovascular:      Pulses: Normal pulses.   Pulmonary:      Effort: No respiratory distress.   Musculoskeletal:         General: No tenderness (Patient has no pain about the right or left great toe.  There is some mild ecchymosis of the nail but no active hemorrhage.  No instability.  No pain  on palpation.  Normal range of motion with FHL and EHL) or deformity.   Skin:     Capillary Refill: Capillary refill takes less than 2 seconds.      Findings: Bruising present.   Neurological:      Mental Status: He is alert.         Lab Results: I have reviewed the following results:    Imaging Results Review: I personally reviewed the following image studies in PACS and associated radiology reports: xray(s). My interpretation of the radiology images/reports is: Possible oblique fracture at the proximal lateral base of the distal phalanx of the great toe.  Nondisplaced.  In my opinion this does not appear acute and is likely chronic especially given he has no clinical symptoms of fracture..

## 2024-12-25 NOTE — ASSESSMENT & PLAN NOTE
Lab Results   Component Value Date    HGBA1C 5.7 (H) 12/24/2024       Recent Labs     12/24/24  1723 12/25/24  0005 12/25/24  0510 12/25/24  1218   POCGLU 83 78 85 87       Blood Sugar Average: Last 72 hrs:  (P) 92  Monitor fingersticks, ISS as needed

## 2024-12-25 NOTE — PLAN OF CARE
Problem: Potential for Falls  Goal: Patient will remain free of falls  Description: INTERVENTIONS:  - Educate patient/family on patient safety including physical limitations  - Instruct patient to call for assistance with activity   - Consult OT/PT to assist with strengthening/mobility   - Keep Call bell within reach  - Keep bed low and locked with side rails adjusted as appropriate  - Keep care items and personal belongings within reach  - Initiate and maintain comfort rounds  - Make Fall Risk Sign visible to staff  - Offer Toileting every 2 Hours, in advance of need  - Initiate/Maintain bed/chair alarm  - Obtain necessary fall risk management equipment  - Apply yellow socks and bracelet for high fall risk patients  - Consider moving patient to room near nurses station  Outcome: Progressing     Problem: PAIN - ADULT  Goal: Verbalizes/displays adequate comfort level or baseline comfort level  Description: Interventions:  - Encourage patient to monitor pain and request assistance  - Assess pain using appropriate pain scale  - Administer analgesics based on type and severity of pain and evaluate response  - Implement non-pharmacological measures as appropriate and evaluate response  - Consider cultural and social influences on pain and pain management  - Notify physician/advanced practitioner if interventions unsuccessful or patient reports new pain  Outcome: Progressing     Problem: INFECTION - ADULT  Goal: Absence or prevention of progression during hospitalization  Description: INTERVENTIONS:  - Assess and monitor for signs and symptoms of infection  - Monitor lab/diagnostic results  - Monitor all insertion sites, i.e. indwelling lines, tubes, and drains  - Monitor endotracheal if appropriate and nasal secretions for changes in amount and color  - Pilot appropriate cooling/warming therapies per order  - Administer medications as ordered  - Instruct and encourage patient and family to use good hand hygiene  technique  - Identify and instruct in appropriate isolation precautions for identified infection/condition  Outcome: Progressing  Goal: Absence of fever/infection during neutropenic period  Description: INTERVENTIONS:  - Monitor WBC    Outcome: Progressing     Problem: SAFETY ADULT  Goal: Patient will remain free of falls  Description: INTERVENTIONS:  - Educate patient/family on patient safety including physical limitations  - Instruct patient to call for assistance with activity   - Consult OT/PT to assist with strengthening/mobility   - Keep Call bell within reach  - Keep bed low and locked with side rails adjusted as appropriate  - Keep care items and personal belongings within reach  - Initiate and maintain comfort rounds  - Make Fall Risk Sign visible to staff  - Offer Toileting every 2 Hours, in advance of need  - Initiate/Maintain bed/chair alarm  - Obtain necessary fall risk management equipment  - Apply yellow socks and bracelet for high fall risk patients  - Consider moving patient to room near nurses station  Outcome: Progressing  Goal: Maintain or return to baseline ADL function  Description: INTERVENTIONS:  -  Assess patient's ability to carry out ADLs; assess patient's baseline for ADL function and identify physical deficits which impact ability to perform ADLs (bathing, care of mouth/teeth, toileting, grooming, dressing, etc.)  - Assess/evaluate cause of self-care deficits   - Assess range of motion  - Assess patient's mobility; develop plan if impaired  - Assess patient's need for assistive devices and provide as appropriate  - Encourage maximum independence but intervene and supervise when necessary  - Involve family in performance of ADLs  - Assess for home care needs following discharge   - Consider OT consult to assist with ADL evaluation and planning for discharge  - Provide patient education as appropriate  Outcome: Progressing  Goal: Maintains/Returns to pre admission functional  level  Description: INTERVENTIONS:  - Perform AM-PAC 6 Click Basic Mobility/ Daily Activity assessment daily.  - Set and communicate daily mobility goal to care team and patient/family/caregiver.   - Collaborate with rehabilitation services on mobility goals if consulted  - Perform Range of Motion 3 times a day.  - Reposition patient every 2 hours.  - Dangle patient 3 times a day  - Stand patient 3 times a day  - Ambulate patient 3 times a day  - Out of bed to chair 3 times a day   - Out of bed for meals 3 times a day  - Out of bed for toileting  - Record patient progress and toleration of activity level   Outcome: Progressing     Problem: DISCHARGE PLANNING  Goal: Discharge to home or other facility with appropriate resources  Description: INTERVENTIONS:  - Identify barriers to discharge w/patient and caregiver  - Arrange for needed discharge resources and transportation as appropriate  - Identify discharge learning needs (meds, wound care, etc.)  - Arrange for interpretive services to assist at discharge as needed  - Refer to Case Management Department for coordinating discharge planning if the patient needs post-hospital services based on physician/advanced practitioner order or complex needs related to functional status, cognitive ability, or social support system  Outcome: Progressing     Problem: Knowledge Deficit  Goal: Patient/family/caregiver demonstrates understanding of disease process, treatment plan, medications, and discharge instructions  Description: Complete learning assessment and assess knowledge base.  Interventions:  - Provide teaching at level of understanding  - Provide teaching via preferred learning methods  Outcome: Progressing     Problem: Nutrition/Hydration-ADULT  Goal: Nutrient/Hydration intake appropriate for improving, restoring or maintaining nutritional needs  Description: Monitor and assess patient's nutrition/hydration status for malnutrition. Collaborate with interdisciplinary  team and initiate plan and interventions as ordered.  Monitor patient's weight and dietary intake as ordered or per policy. Utilize nutrition screening tool and intervene as necessary. Determine patient's food preferences and provide high-protein, high-caloric foods as appropriate.     INTERVENTIONS:  - Monitor oral intake, urinary output, labs, and treatment plans  - Assess nutrition and hydration status and recommend course of action  - Evaluate amount of meals eaten  - Assist patient with eating if necessary   - Allow adequate time for meals  - Recommend/ encourage appropriate diets, oral nutritional supplements, and vitamin/mineral supplements  - Order, calculate, and assess calorie counts as needed  - Recommend, monitor, and adjust tube feedings and TPN/PPN based on assessed needs  - Assess need for intravenous fluids  - Provide specific nutrition/hydration education as appropriate  - Include patient/family/caregiver in decisions related to nutrition  Outcome: Progressing     Problem: MOBILITY - ADULT  Goal: Maintain or return to baseline ADL function  Description: INTERVENTIONS:  -  Assess patient's ability to carry out ADLs; assess patient's baseline for ADL function and identify physical deficits which impact ability to perform ADLs (bathing, care of mouth/teeth, toileting, grooming, dressing, etc.)  - Assess/evaluate cause of self-care deficits   - Assess range of motion  - Assess patient's mobility; develop plan if impaired  - Assess patient's need for assistive devices and provide as appropriate  - Encourage maximum independence but intervene and supervise when necessary  - Involve family in performance of ADLs  - Assess for home care needs following discharge   - Consider OT consult to assist with ADL evaluation and planning for discharge  - Provide patient education as appropriate  Outcome: Progressing  Goal: Maintains/Returns to pre admission functional level  Description: INTERVENTIONS:  - Perform  AM-PAC 6 Click Basic Mobility/ Daily Activity assessment daily.  - Set and communicate daily mobility goal to care team and patient/family/caregiver.   - Collaborate with rehabilitation services on mobility goals if consulted  - Perform Range of Motion 3 times a day.  - Reposition patient every 2 hours.  - Dangle patient 3 times a day  - Stand patient 3 times a day  - Ambulate patient 3 times a day  - Out of bed to chair 3 times a day   - Out of bed for meals 3 times a day  - Out of bed for toileting  - Record patient progress and toleration of activity level   Outcome: Progressing     Problem: Prexisting or High Potential for Compromised Skin Integrity  Goal: Skin integrity is maintained or improved  Description: INTERVENTIONS:  - Identify patients at risk for skin breakdown  - Assess and monitor skin integrity  - Assess and monitor nutrition and hydration status  - Monitor labs   - Assess for incontinence   - Turn and reposition patient  - Assist with mobility/ambulation  - Relieve pressure over bony prominences  - Avoid friction and shearing  - Provide appropriate hygiene as needed including keeping skin clean and dry  - Evaluate need for skin moisturizer/barrier cream  - Collaborate with interdisciplinary team   - Patient/family teaching  - Consider wound care consult   Outcome: Progressing

## 2024-12-25 NOTE — PROGRESS NOTES
"Progress Note - Neurology   Name: Constanza Zhu 64 y.o. male I MRN: [id number]  Unit/Bed#: -01 I Date of Admission: 12/24/2024   Date of Service: 12/25/2024 I Hospital Day: 0     Assessment & Plan  Altered mental status  64 y.o.  male with recurrent strokes (likely atheroembolic in setting of multifocal stenosis), b/l carotid stenosis s/p L ICA stenting 5/2023 and s/p R carotid stenting 5/2024, iliac artery stenosis, focal epilepsy, anxiety and depression, HTN, and HLD, presented to [facility] on 12/24/24 with altered mental status.     Patient's wife reported that patient had a change in behavior starting around 7 am on 12/24. Wife went to administer his medications and he became extremely agitated and aggressive. He improved about an hour later. He later had a loss of consciousness. No shaking or rigidity witnessed by wife.     Unclear etiology to presentation. Could be seizure/post-ictal encephalopathy. He has since returned to his baseline without any additional events. Low suspicion that the supratherapeutic Keppra level contributed to his mental status, given that he has been stable on this dose for several months without behavioral changes.       Workup  - CTH wo contrast 12/24/24:  \"No acute intracranial abnormality. Chronic microangiopathic changes. Chronic left parieto-occipital encephalomalacia. Prominent cortical atrophy as before.\"  - Keppra level supratherapeutic: 60      Plan  - Continue home Keppra 1500 mg BID  - Continue home ASA, Brilinta, statin   - Continue to monitor neurologic status closely. STAT CTH for acute change in neurologic status. Please contact neurology immediately with acute change in neurologic status  - Medical management and correction of metabolic and infectious disturbances per primary team   - Avoid CNS altering medications if possible    Focal epilepsy (HCC)  - Follows with [facility] neurology for presumed focal epilepsy in the setting of prior b/l MCA strokes  - Recently " "hospitalized at [facility] in late August/early Sept due to staring spells, increased confusion. MRI brain showed an acute to subacute L occpital stroke. Neurology was concerned that the staring spell was a breakthrough seizure. Keppra was increased from 1g BID to 1500 BID. While in the hospital, he had multiple episodes of unconsciousness lasting 30-60 seconds. 24 hr vEEG did not demonstrate any seizure activity. During one of the episodes, patient was hypotensive. Etiology thought to be hypoperfusion in setting of extensive intracranial stenosis.  - See plan above      Neurology Follow Up Recs:   Constanza Zhu will need follow-up in in 4 weeks with epilepsy team for Patient established with outpatient epilepsy team admitted with breakthrough seizure in 30 minute appointment. They will not require outpatient neurological testing. Message sent to schedulers.      Subjective:   Patient is sleeping on arrival, but awakens easily. When asked how he is, he states \" I just dont know what Im doing.\"   He feels back to baseline though when asked.   No recurrent events concerning for seizure.         Past Medical History:   Diagnosis Date    Depression     Diabetes mellitus (HCC)     patient denies    Dyslipidemia 03/26/2019    GERD (gastroesophageal reflux disease)     Hyperlipidemia     Hypertension     Prediabetes     Prediabetes 04/03/2019    Stroke (HCC)      Past Surgical History:   Procedure Laterality Date    ARTHROSCOPIC REPAIR ACL Left     BACK SURGERY      twice    CARPAL TUNNEL RELEASE Right     IR CEREBRAL ANGIOGRAPHY  05/04/2023    IR STROKE ALERT  03/19/2019    SHOULDER SURGERY Left     US GUIDED THYROID BIOPSY  11/21/2023     Family History   Problem Relation Age of Onset    Hyperlipidemia Mother     Hypertension Mother     Diabetes unspecified Mother         prediabetes    Heart attack Mother     Diabetes Mother     Hyperlipidemia Father     Hypertension Father     Prostate cancer Father     Stroke " Father     Hyperlipidemia Sister     Hypertension Sister     Hyperlipidemia Sister     Hypertension Sister     Depression Sister     Hypertension Sister     Hyperlipidemia Sister     Depression Sister     Hyperlipidemia Brother     Hypertension Brother     Hypertension Brother     Prostate cancer Brother     Hypothyroidism Daughter     Hypothyroidism Son     Diabetes unspecified Son     Seizures Neg Hx      Social History     Socioeconomic History    Marital status: /Civil Union     Spouse name: None    Number of children: None    Years of education: None    Highest education level: None   Occupational History    None   Tobacco Use    Smoking status: Former    Smokeless tobacco: Never   Vaping Use    Vaping status: Never Used   Substance and Sexual Activity    Alcohol use: Never    Drug use: Never    Sexual activity: Not Currently   Other Topics Concern    None   Social History Narrative    Lives in [place] with wife     Social Drivers of Health     Financial Resource Strain: Not on file   Food Insecurity: No Food Insecurity (12/24/2024)    Nursing - Inadequate Food Risk Classification     Worried About Running Out of Food in the Last Year: Never true     Ran Out of Food in the Last Year: Never true     Ran Out of Food in the Last Year: 1   Transportation Needs: No Transportation Needs (12/24/2024)    Nursing - Transportation Risk Classification     Lack of Transportation: Not on file     Lack of Transportation: 2   Physical Activity: Not on file   Stress: Not on file   Social Connections: Not on file   Intimate Partner Violence: Unknown (12/24/2024)    Nursing IPS     Feels Physically and Emotionally Safe: Not on file     Physically Hurt by Someone: Not on file     Humiliated or Emotionally Abused by Someone: Not on file     Physically Hurt by Someone: 2     Hurt or Threatened by Someone: 2   Housing Stability: Unknown (12/24/2024)    Nursing: Inadequate Housing Risk Classification     Has Housing:  "Not on file     Worried About Losing Housing: Not on file     Unable to Get Utilities: Not on file     Unable to Pay for Housing in the Last Year: 2     Has Housin         Medications:  All current active meds have been reviewed and current meds:  Scheduled Meds:  Current Facility-Administered Medications   Medication Dose Route Frequency Provider Last Rate    acetaminophen  650 mg Oral Q6H PRN Macy Street MD      amantadine  100 mg Oral BID Macy Street MD      aspirin  81 mg Oral Daily Macy Street MD      atorvastatin  40 mg Oral Daily With Breakfast Macy Street MD      baclofen  5 mg Oral BID PRN Macy Street MD      donepezil  10 mg Oral Daily Macy Street MD      enoxaparin  40 mg Subcutaneous Daily Macy Street MD      insulin lispro  1-5 Units Subcutaneous Q6H [facility] Macy Street MD      levETIRAcetam  1,500 mg Oral Q12H [facility] Macy Street MD      melatonin  5 mg Oral HS Macy Street MD      metoprolol succinate  50 mg Oral Daily Macy Street MD      mirtazapine  7.5 mg Oral HS Macy Street MD      pantoprazole  20 mg Oral Daily Macy Street MD      tamsulosin  0.4 mg Oral Daily With Dinner Macy Street MD      ticagrelor  90 mg Oral Q12H [facility] Macy Street MD       Continuous Infusions:   PRN Meds:.  acetaminophen    baclofen       ROS:   Review of Systems   Neurological:  Positive for speech difficulty.   Feels at baseline        Vitals:   BP (!) 173/80 (BP Location: Left arm)   Pulse 77   Temp (!) 96.8 °F (36 °C) (Oral)   Resp 20   Ht 5' 6\" (1.676 m)   Wt 93.6 kg (206 lb 5.6 oz)   SpO2 96%   BMI 33.31 kg/m²     Physical Exam:   Physical Exam  Vitals and nursing note reviewed.   Constitutional:       General: He is not in acute distress.     Appearance: He is not ill-appearing, toxic-appearing or diaphoretic.   HENT:      Head: Normocephalic and atraumatic.      Nose: Nose normal.      Mouth/Throat:      Mouth: " Mucous membranes are moist.      Pharynx: Oropharynx is clear.   Eyes:      General: No scleral icterus.        Right eye: No discharge.         Left eye: No discharge.      Extraocular Movements: Extraocular movements intact.   Cardiovascular:      Rate and Rhythm: Normal rate.      Comments: Per review of vitals  Pulmonary:      Effort: Pulmonary effort is normal. No respiratory distress.      Breath sounds: No stridor.   Neurological:      Mental Status: He is alert.       Neurological Exam  Mental Status  Alert.  Oriented to person, place, but not month/year.   Speech is hypophonic but clear. Aphasia present. Has trouble naming some objects- couldn't name thumb, but could name watch .   Trouble with complex commands. .    Cranial Nerves  CN III, IV, VI: Extraocular movements intact bilaterally.  CN V: Facial sensation is normal.  CN VII: Full and symmetric facial movement.  CN VIII: Hearing is normal.    Motor    Proximal weakness RUE, 3+/5.   Full strength otherwise.    Sensory  Light touch is normal in upper and lower extremities.     Gait    Deferred.          Labs: I have personally reviewed pertinent reports.   Recent Results (from the past 24 hours)   CBC and differential    Collection Time: 12/24/24  9:29 AM   Result Value Ref Range    WBC 8.59 4.31 - 10.16 Thousand/uL    RBC 4.55 3.88 - 5.62 Million/uL    Hemoglobin 13.8 12.0 - 17.0 g/dL    Hematocrit 42.5 36.5 - 49.3 %    MCV 93 82 - 98 fL    MCH 30.3 26.8 - 34.3 pg    MCHC 32.5 31.4 - 37.4 g/dL    RDW 13.6 11.6 - 15.1 %    MPV 9.9 8.9 - 12.7 fL    Platelets 217 149 - 390 Thousands/uL    nRBC 0 /100 WBCs    Segmented % 67 43 - 75 %    Immature Grans % 0 0 - 2 %    Lymphocytes % 22 14 - 44 %    Monocytes % 8 4 - 12 %    Eosinophils Relative 2 0 - 6 %    Basophils Relative 1 0 - 1 %    Absolute Neutrophils 5.69 1.85 - 7.62 Thousands/µL    Absolute Immature Grans 0.02 0.00 - 0.20 Thousand/uL    Absolute Lymphocytes 1.92 0.60 - 4.47 Thousands/µL    Absolute  "Monocytes 0.67 0.17 - 1.22 Thousand/µL    Eosinophils Absolute 0.21 0.00 - 0.61 Thousand/µL    Basophils Absolute 0.08 0.00 - 0.10 Thousands/µL   Comprehensive metabolic panel    Collection Time: 12/24/24  9:29 AM   Result Value Ref Range    Sodium 139 135 - 147 mmol/L    Potassium 4.3 3.5 - 5.3 mmol/L    Chloride 108 96 - 108 mmol/L    CO2 28 21 - 32 mmol/L    ANION GAP 3 (L) 4 - 13 mmol/L    BUN 26 (H) 5 - 25 mg/dL    Creatinine 1.11 0.60 - 1.30 mg/dL    Glucose 116 65 - 140 mg/dL    Calcium 8.5 8.4 - 10.2 mg/dL    AST 14 13 - 39 U/L    ALT 15 7 - 52 U/L    Alkaline Phosphatase 105 (H) 34 - 104 U/L    Total Protein 6.0 (L) 6.4 - 8.4 g/dL    Albumin 3.8 3.5 - 5.0 g/dL    Total Bilirubin 0.48 0.20 - 1.00 mg/dL    eGFR 69 ml/min/1.73sq m   TSH, 3rd generation with Free T4 reflex    Collection Time: 12/24/24  9:29 AM   Result Value Ref Range    TSH 3RD GENERATON 1.954 0.450 - 4.500 uIU/mL   HS Troponin 0hr (reflex protocol)    Collection Time: 12/24/24  9:29 AM   Result Value Ref Range    hs TnI 0hr 8 \"Refer to ACS Flowchart\"- see link ng/L   FLU/COVID Rapid Antigen (30 min. TAT) - Preferred screening test in ED    Collection Time: 12/24/24  9:29 AM    Specimen: Nose; Nares   Result Value Ref Range    SARS COV Rapid Antigen Negative Negative    Influenza A Rapid Antigen Negative Negative    Influenza B Rapid Antigen Negative Negative   Lactic acid, plasma (w/reflex if result > 2.0)    Collection Time: 12/24/24  9:29 AM   Result Value Ref Range    LACTIC ACID 1.1 0.5 - 2.0 mmol/L   Ethanol    Collection Time: 12/24/24  9:29 AM   Result Value Ref Range    Ethanol Lvl <10 <10 mg/dL   Salicylate level    Collection Time: 12/24/24  9:29 AM   Result Value Ref Range    Salicylate Lvl <5 3 - 20 mg/dL   Acetaminophen level-If concentration is detectable, please discuss with medical  on call.    Collection Time: 12/24/24  9:29 AM   Result Value Ref Range    Acetaminophen Level 9 (L) 10 - 20 ug/mL   TSH, 3rd " "generation    Collection Time: 12/24/24  9:29 AM   Result Value Ref Range    TSH 3RD GENERATON 1.876 0.450 - 4.500 uIU/mL   POCT Blood Gas (CG8+)    Collection Time: 12/24/24  9:31 AM   Result Value Ref Range    ph, David ISTAT 7.326 7.300 - 7.400    pCO2, David i-STAT 48.5 42.0 - 50.0 mm HG    pO2, David i-STAT 22.0 (L) 35.0 - 45.0 mm HG    BE, i-STAT -1 -2 - 3 mmol/L    HCO3, David i-STAT 25.4 24.0 - 30.0 mmol/L    CO2, i-STAT 27 21 - 32 mmol/L    O2 Sat, i-STAT 33 (L) 60 - 85 %    SODIUM, I-STAT 142 136 - 145 mmol/l    Potassium, i-STAT 4.2 3.5 - 5.3 mmol/L    Calcium, Ionized i-STAT 1.23 1.12 - 1.32 mmol/L    Hct, i-STAT 41 36.5 - 49.3 %    Hgb, i-STAT 13.9 12.0 - 17.0 g/dl    Glucose, i-STAT 114 65 - 140 mg/dl    Specimen Type VENOUS    Levetiracetam level    Collection Time: 12/24/24 10:08 AM   Result Value Ref Range    Levetiracetam Lvl 60.0 (H) 12.0 - 46.0 ug/mL   HS Troponin I 2hr    Collection Time: 12/24/24 11:41 AM   Result Value Ref Range    hs TnI 2hr 8 \"Refer to ACS Flowchart\"- see link ng/L    Delta 2hr hsTnI 0 <20 ng/L   COVID/FLU/RSV    Collection Time: 12/24/24  4:00 PM    Specimen: Nose; Nares   Result Value Ref Range    SARS-CoV-2 Negative Negative    INFLUENZA A PCR Negative Negative    INFLUENZA B PCR Negative Negative    RSV PCR Negative Negative   Rapid drug screen, urine    Collection Time: 12/24/24  4:12 PM   Result Value Ref Range    Amph/Meth UR Negative Negative    Barbiturate Ur Negative Negative    Benzodiazepine Urine Negative Negative    Cocaine Urine Negative Negative    Methadone Urine Negative Negative    Opiate Urine Negative Negative    PCP Ur Negative Negative    THC Urine Negative Negative    Oxycodone Urine Negative Negative    Fentanyl Urine Negative Negative    HYDROCODONE URINE Negative Negative   UA (URINE) with reflex to Scope    Collection Time: 12/24/24  4:12 PM   Result Value Ref Range    Color, UA Yellow     Clarity, UA Clear     Specific Gravity, UA 1.025 1.005 - 1.030    " pH, UA 6.0 4.5, 5.0, 5.5, 6.0, 6.5, 7.0, 7.5, 8.0    Leukocytes, UA Negative Negative    Nitrite, UA Negative Negative    Protein, UA Trace (A) Negative mg/dl    Glucose, UA 30 (3/100%) (A) Negative mg/dl    Ketones, UA Negative Negative mg/dl    Urobilinogen, UA <2.0 <2.0 mg/dl mg/dl    Bilirubin, UA Negative Negative    Occult Blood, UA Negative Negative   Urine Microscopic    Collection Time: 12/24/24  4:12 PM   Result Value Ref Range    RBC, UA None Seen None Seen, 0-1, 1-2, 2-4, 0-5 /hpf    WBC, UA 1-2 None Seen, 0-1, 1-2, 0-5, 2-4 /hpf    Epithelial Cells Occasional None Seen, Occasional /hpf    Bacteria, UA Occasional None Seen, Occasional /hpf    MUCUS THREADS Occasional (A) None Seen    Granular Casts, UA 0-3 (A) (none)   Fingerstick Glucose (POCT)    Collection Time: 12/24/24  5:23 PM   Result Value Ref Range    POC Glucose 83 65 - 140 mg/dl   Fingerstick Glucose (POCT)    Collection Time: 12/25/24 12:05 AM   Result Value Ref Range    POC Glucose 78 65 - 140 mg/dl   Fingerstick Glucose (POCT)    Collection Time: 12/25/24  5:10 AM   Result Value Ref Range    POC Glucose 85 65 - 140 mg/dl       Imaging: I have personally reviewed the images and reports for the following studies: No new neuroimaging to review      EKG, Pathology, and Other Studies: I have personally reviewed pertinent reports.       VTE Prophylaxis: VTE covered by:  enoxaparin, Subcutaneous, 40 mg at 12/25/24 3905

## 2024-12-25 NOTE — PLAN OF CARE
Problem: PAIN - ADULT  Goal: Verbalizes/displays adequate comfort level or baseline comfort level  Description: Interventions:  - Encourage patient to monitor pain and request assistance  - Assess pain using appropriate pain scale  - Administer analgesics based on type and severity of pain and evaluate response  - Implement non-pharmacological measures as appropriate and evaluate response  - Consider cultural and social influences on pain and pain management  - Notify physician/advanced practitioner if interventions unsuccessful or patient reports new pain  Outcome: Progressing     Problem: INFECTION - ADULT  Goal: Absence or prevention of progression during hospitalization  Description: INTERVENTIONS:  - Assess and monitor for signs and symptoms of infection  - Monitor lab/diagnostic results  - Monitor all insertion sites, i.e. indwelling lines, tubes, and drains  - Monitor endotracheal if appropriate and nasal secretions for changes in amount and color  - Brantley appropriate cooling/warming therapies per order  - Administer medications as ordered  - Instruct and encourage patient and family to use good hand hygiene technique  - Identify and instruct in appropriate isolation precautions for identified infection/condition  Outcome: Progressing  Goal: Absence of fever/infection during neutropenic period  Description: INTERVENTIONS:  - Monitor WBC    Outcome: Progressing     Problem: SAFETY ADULT  Goal: Patient will remain free of falls  Description: INTERVENTIONS:  - Educate patient/family on patient safety including physical limitations  - Instruct patient to call for assistance with activity   - Consult OT/PT to assist with strengthening/mobility   - Keep Call bell within reach  - Keep bed low and locked with side rails adjusted as appropriate  - Keep care items and personal belongings within reach  - Initiate and maintain comfort rounds  - Make Fall Risk Sign visible to staff  - Offer Toileting every 2 Hours,  in advance of need  - Initiate/Maintain bed alarm  - Obtain necessary fall risk management equipment: socks  - Apply yellow socks and bracelet for high fall risk patients  - Consider moving patient to room near nurses station  Outcome: Progressing  Goal: Maintain or return to baseline ADL function  Description: INTERVENTIONS:  -  Assess patient's ability to carry out ADLs; assess patient's baseline for ADL function and identify physical deficits which impact ability to perform ADLs (bathing, care of mouth/teeth, toileting, grooming, dressing, etc.)  - Assess/evaluate cause of self-care deficits   - Assess range of motion  - Assess patient's mobility; develop plan if impaired  - Assess patient's need for assistive devices and provide as appropriate  - Encourage maximum independence but intervene and supervise when necessary  - Involve family in performance of ADLs  - Assess for home care needs following discharge   - Consider OT consult to assist with ADL evaluation and planning for discharge  - Provide patient education as appropriate  Outcome: Progressing  Problem: DISCHARGE PLANNING  Goal: Discharge to home or other facility with appropriate resources  Description: INTERVENTIONS:  - Identify barriers to discharge w/patient and caregiver  - Arrange for needed discharge resources and transportation as appropriate  - Identify discharge learning needs (meds, wound care, etc.)  - Arrange for interpretive services to assist at discharge as needed  - Refer to Case Management Department for coordinating discharge planning if the patient needs post-hospital services based on physician/advanced practitioner order or complex needs related to functional status, cognitive ability, or social support system  Outcome: Progressing     Problem: Knowledge Deficit  Goal: Patient/family/caregiver demonstrates understanding of disease process, treatment plan, medications, and discharge instructions  Description: Complete learning  assessment and assess knowledge base.  Interventions:  - Provide teaching at level of understanding  - Provide teaching via preferred learning methods  Outcome: Progressing     Problem: Nutrition/Hydration-ADULT  Goal: Nutrient/Hydration intake appropriate for improving, restoring or maintaining nutritional needs  Description: Monitor and assess patient's nutrition/hydration status for malnutrition. Collaborate with interdisciplinary team and initiate plan and interventions as ordered.  Monitor patient's weight and dietary intake as ordered or per policy. Utilize nutrition screening tool and intervene as necessary. Determine patient's food preferences and provide high-protein, high-caloric foods as appropriate.     INTERVENTIONS:  - Monitor oral intake, urinary output, labs, and treatment plans  - Assess nutrition and hydration status and recommend course of action  - Evaluate amount of meals eaten  - Assist patient with eating if necessary   - Allow adequate time for meals  - Recommend/ encourage appropriate diets, oral nutritional supplements, and vitamin/mineral supplements  - Order, calculate, and assess calorie counts as needed  - Recommend, monitor, and adjust tube feedings and TPN/PPN based on assessed needs  - Assess need for intravenous fluids  - Provide specific nutrition/hydration education as appropriate  - Include patient/family/caregiver in decisions related to nutrition  Outcome: Progressing     Problem: MOBILITY - ADULT  Goal: Maintain or return to baseline ADL function  Description: INTERVENTIONS:  -  Assess patient's ability to carry out ADLs; assess patient's baseline for ADL function and identify physical deficits which impact ability to perform ADLs (bathing, care of mouth/teeth, toileting, grooming, dressing, etc.)  - Assess/evaluate cause of self-care deficits   - Assess range of motion  - Assess patient's mobility; develop plan if impaired  - Assess patient's need for assistive devices and  provide as appropriate  - Encourage maximum independence but intervene and supervise when necessary  - Involve family in performance of ADLs  - Assess for home care needs following discharge   - Consider OT consult to assist with ADL evaluation and planning for discharge  - Provide patient education as appropriate  Outcome: Progressing  Goal: Maintains/Returns to pre admission functional level  Description: INTERVENTIONS:  - Perform AM-PAC 6 Click Basic Mobility/ Daily Activity assessment daily.  - Set and communicate daily mobility goal to care team and patient/family/caregiver.   - Collaborate with rehabilitation services on mobility goals if consulted  - Perform Range of Motion 3 times a day.  - Reposition patient every 2 hours.  - Dangle patient 3 times a day  - Stand patient 3 times a day  - Ambulate patient 3 times a day  - Out of bed to chair 3 times a day   - Out of bed for meals 3 times a day  - Out of bed for toileting  - Record patient progress and toleration of activity level   Outcome: Progressing     Problem: Prexisting or High Potential for Compromised Skin Integrity  Goal: Skin integrity is maintained or improved  Description: INTERVENTIONS:  - Identify patients at risk for skin breakdown  - Assess and monitor skin integrity  - Assess and monitor nutrition and hydration status  - Monitor labs   - Assess for incontinence   - Turn and reposition patient  - Assist with mobility/ambulation  - Relieve pressure over bony prominences  - Avoid friction and shearing  - Provide appropriate hygiene as needed including keeping skin clean and dry  - Evaluate need for skin moisturizer/barrier cream  - Collaborate with interdisciplinary team   - Patient/family teaching  - Consider wound care consult   Outcome: Progressing

## 2024-12-25 NOTE — PROGRESS NOTES
Progress Note - Hospitalist   Name: Constanza Zhu 64 y.o. male I MRN: [id number]  Unit/Bed#: -01 I Date of Admission: 12/24/2024   Date of Service: 12/25/2024 I Hospital Day: 0    Assessment & Plan  Altered mental status  Patient presents with acute agitation followed by loss of consciousness, increased extremity tone  So far VSS. Labs remarkable for elevated creatinine from baseline, CXR shows no active disease, CT brain is negative, VBG does not suggest hypercarbia/hypoxemia  Patient received 1000mg IV keppra in ER  Patient to be admitted for further management of AMS.   Differentials include breakthrough seizure, acute cva/tia, cerebral hypoperfusion  Neuro checks q4 hrs  Continue with antiepileptics, keppra level was sent prior to admission  Check Urinalysis, utox, COVID/RSV  Hold sedating medications  Neurology consulted for further management   Fall and seizure precautions  Ativan prn seizure like activity   Speech eval  NPO for now unless passes dysphagia screen   PT/OT   History of stroke  CT brain negative  Continue with aspirin, brilinta, lipitor  Type 2 diabetes mellitus with other diabetic ophthalmic complication (HCC)  Lab Results   Component Value Date    HGBA1C 5.7 (H) 12/24/2024       Recent Labs     12/24/24  1723 12/25/24  0005 12/25/24  0510 12/25/24  1218   POCGLU 83 78 85 87       Blood Sugar Average: Last 72 hrs:  (P) 92  Monitor fingersticks, ISS as needed  Primary hypertension  BP is elevated on multiple occasions  Will add HCTZ  Continue with metoprolol 50mg daily   GERD (gastroesophageal reflux disease)  Continue with PPI   Focal epilepsy (HCC)    Closed nondisplaced fracture of phalanx of right great toe  Likely occurred in agitated state.   Currently non-tender to palpation   Will get podiatry input     VTE Pharmacologic Prophylaxis:   High Risk (Score >/= 5) - Pharmacological DVT Prophylaxis Ordered: enoxaparin (Lovenox). Sequential Compression Devices Ordered.    Mobility:    Basic Mobility Inpatient Raw Score: 18  JH-HLM Goal: 6: Walk 10 steps or more  JH-HLM Achieved: 4: Move to chair/commode  JH-HLM Goal NOT achieved. Continue with multidisciplinary rounding and encourage appropriate mobility to improve upon JH-HLM goals.    Patient Centered Rounds: I performed bedside rounds with nursing staff today.   Discussions with Specialists or Other Care Team Provider: podiatry and neurology    Education and Discussions with Family / Patient: Updated  (wife) at bedside.    Current Length of Stay: 0 day(s)  Current Patient Status: Observation   Certification Statement: The patient will continue to require additional inpatient hospital stay due to need for PT/OT assessment  Discharge Plan: Anticipate discharge in 24-48 hrs to home with home services.    Code Status: Level 1 - Full Code    Cecille Apodaca was seen and examined. He is doing well and all his family at the bedside.   Objective :  Temp:  [96.8 °F (36 °C)-97.7 °F (36.5 °C)] 97.7 °F (36.5 °C)  HR:  [73-87] 85  BP: (148-173)/(75-99) 171/99  Resp:  [16-20] 20  SpO2:  [94 %-99 %] 99 %  O2 Device: None (Room air)    Body mass index is 33.31 kg/m².     Input and Output Summary (last 24 hours):     Intake/Output Summary (Last 24 hours) at 12/25/2024 1543  Last data filed at 12/25/2024 1454  Gross per 24 hour   Intake 385 ml   Output 450 ml   Net -65 ml       Physical Exam  Vitals and nursing note reviewed.   Constitutional:       General: He is not in acute distress.     Appearance: He is well-developed.   HENT:      Head: Normocephalic and atraumatic.   Cardiovascular:      Rate and Rhythm: Normal rate and regular rhythm.      Heart sounds: No murmur heard.  Pulmonary:      Effort: Pulmonary effort is normal. No respiratory distress.      Breath sounds: Normal breath sounds.   Abdominal:      Palpations: Abdomen is soft.      Tenderness: There is no abdominal tenderness.   Musculoskeletal:         General: Signs of  injury (right ankle bruising and small lacerations) present. No swelling.   Skin:     General: Skin is warm and dry.   Neurological:      Mental Status: He is alert.   Psychiatric:         Mood and Affect: Mood normal.           Lines/Drains:              Lab Results: I have reviewed the following results:   Results from last 7 days   Lab Units 12/24/24  0931 12/24/24  0929   WBC Thousand/uL  --  8.59   HEMOGLOBIN g/dL  --  13.8   I STAT HEMOGLOBIN g/dl 13.9  --    HEMATOCRIT %  --  42.5   HEMATOCRIT, ISTAT % 41  --    PLATELETS Thousands/uL  --  217   SEGS PCT %  --  67   LYMPHO PCT %  --  22   MONO PCT %  --  8   EOS PCT %  --  2     Results from last 7 days   Lab Units 12/24/24  0931 12/24/24  0929   SODIUM mmol/L  --  139   POTASSIUM mmol/L  --  4.3   CHLORIDE mmol/L  --  108   CO2 mmol/L  --  28   CO2, I-STAT mmol/L 27  --    BUN mg/dL  --  26*   CREATININE mg/dL  --  1.11   ANION GAP mmol/L  --  3*   CALCIUM mg/dL  --  8.5   ALBUMIN g/dL  --  3.8   TOTAL BILIRUBIN mg/dL  --  0.48   ALK PHOS U/L  --  105*   ALT U/L  --  15   AST U/L  --  14   GLUCOSE RANDOM mg/dL  --  116         Results from last 7 days   Lab Units 12/25/24  1218 12/25/24  0510 12/25/24  0005 12/24/24  1723 12/24/24  0915   POC GLUCOSE mg/dl 87 85 78 83 127     Results from last 7 days   Lab Units 12/24/24  0929   HEMOGLOBIN A1C % 5.7*     Results from last 7 days   Lab Units 12/24/24  0929   LACTIC ACID mmol/L 1.1       Recent Cultures (last 7 days):               Last 24 Hours Medication List:     Current Facility-Administered Medications:     acetaminophen (TYLENOL) tablet 650 mg, Q6H PRN    amantadine (SYMMETREL) capsule 100 mg, BID    aspirin chewable tablet 81 mg, Daily    atorvastatin (LIPITOR) tablet 40 mg, Daily With Breakfast    baclofen tablet 5 mg, BID PRN    donepezil (ARICEPT) tablet 10 mg, Daily    enoxaparin (LOVENOX) subcutaneous injection 40 mg, Daily    hydroCHLOROthiazide tablet 25 mg, Daily    insulin lispro  (HumALOG/ADMELOG) 100 units/mL subcutaneous injection 1-5 Units, Q6H JOSE **AND** Fingerstick Glucose (POCT), Q6H    levETIRAcetam (KEPPRA) tablet 1,500 mg, Q12H JOSE    melatonin tablet 5 mg, HS    metoprolol succinate (TOPROL-XL) 24 hr tablet 50 mg, Daily    mirtazapine (REMERON) tablet 7.5 mg, HS    pantoprazole (PROTONIX) EC tablet 20 mg, Daily    tamsulosin (FLOMAX) capsule 0.4 mg, Daily With Dinner    ticagrelor (BRILINTA) tablet 90 mg, Q12H JOSE    Administrative Statements   Today, Patient Was Seen By: Nanda Tse MD      **Please Note: This note may have been constructed using a voice recognition system.**

## 2024-12-25 NOTE — ASSESSMENT & PLAN NOTE
"64 y.o.  male with recurrent strokes (likely atheroembolic in setting of multifocal stenosis), b/l carotid stenosis s/p L ICA stenting 5/2023 and s/p R carotid stenting 5/2024, iliac artery stenosis, focal epilepsy, anxiety and depression, HTN, and HLD, presented to [facility] on 12/24/24 with altered mental status.     Patient's wife reported that patient had a change in behavior starting around 7 am on 12/24. Wife went to administer his medications and he became extremely agitated and aggressive. He improved about an hour later. He later had a loss of consciousness. No shaking or rigidity witnessed by wife.     Unclear etiology to presentation. Could be seizure/post-ictal encephalopathy. He has since returned to his baseline without any additional events. Low suspicion that the supratherapeutic Keppra level contributed to his mental status, given that he has been stable on this dose for several months without behavioral changes.       Workup  - CTH wo contrast 12/24/24:  \"No acute intracranial abnormality. Chronic microangiopathic changes. Chronic left parieto-occipital encephalomalacia. Prominent cortical atrophy as before.\"  - Keppra level supratherapeutic: 60      Plan  - Continue home Keppra 1500 mg BID  - Continue home ASA, Brilinta, statin   - Continue to monitor neurologic status closely. STAT CTH for acute change in neurologic status. Please contact neurology immediately with acute change in neurologic status  - Medical management and correction of metabolic and infectious disturbances per primary team   - Avoid CNS altering medications if possible    "

## 2024-12-25 NOTE — ASSESSMENT & PLAN NOTE
I personally reviewed the x-ray.  There is a possible nondisplaced oblique fracture at the proximal lateral aspect of the distal phalanx of the great toe.  My personal opinion is that this does not appear acute.  In addition patient has no pain or clinical symptoms.  I feel he can weight-bear as tolerated.  No further acute care.  There is some mild bruising which will resolve with time and rest

## 2024-12-26 LAB
ANION GAP SERPL CALCULATED.3IONS-SCNC: 8 MMOL/L (ref 4–13)
BUN SERPL-MCNC: 25 MG/DL (ref 5–25)
CALCIUM SERPL-MCNC: 9 MG/DL (ref 8.4–10.2)
CHLORIDE SERPL-SCNC: 104 MMOL/L (ref 96–108)
CO2 SERPL-SCNC: 25 MMOL/L (ref 21–32)
CREAT SERPL-MCNC: 1.09 MG/DL (ref 0.6–1.3)
ERYTHROCYTE [DISTWIDTH] IN BLOOD BY AUTOMATED COUNT: 13.5 % (ref 11.6–15.1)
GFR SERPL CREATININE-BSD FRML MDRD: 70 ML/MIN/1.73SQ M
GLUCOSE SERPL-MCNC: 103 MG/DL (ref 65–140)
GLUCOSE SERPL-MCNC: 112 MG/DL (ref 65–140)
GLUCOSE SERPL-MCNC: 116 MG/DL (ref 65–140)
GLUCOSE SERPL-MCNC: 149 MG/DL (ref 65–140)
GLUCOSE SERPL-MCNC: 95 MG/DL (ref 65–140)
HCT VFR BLD AUTO: 43.8 % (ref 36.5–49.3)
HGB BLD-MCNC: 14.3 G/DL (ref 12–17)
MAGNESIUM SERPL-MCNC: 2.2 MG/DL (ref 1.9–2.7)
MCH RBC QN AUTO: 29.8 PG (ref 26.8–34.3)
MCHC RBC AUTO-ENTMCNC: 32.6 G/DL (ref 31.4–37.4)
MCV RBC AUTO: 91 FL (ref 82–98)
PHOSPHATE SERPL-MCNC: 4.3 MG/DL (ref 2.3–4.1)
PLATELET # BLD AUTO: 247 THOUSANDS/UL (ref 149–390)
PMV BLD AUTO: 9.9 FL (ref 8.9–12.7)
POTASSIUM SERPL-SCNC: 3.8 MMOL/L (ref 3.5–5.3)
RBC # BLD AUTO: 4.8 MILLION/UL (ref 3.88–5.62)
SODIUM SERPL-SCNC: 137 MMOL/L (ref 135–147)
WBC # BLD AUTO: 9.04 THOUSAND/UL (ref 4.31–10.16)

## 2024-12-26 PROCEDURE — 80048 BASIC METABOLIC PNL TOTAL CA: CPT | Performed by: PHYSICIAN ASSISTANT

## 2024-12-26 PROCEDURE — 92610 EVALUATE SWALLOWING FUNCTION: CPT

## 2024-12-26 PROCEDURE — 97163 PT EVAL HIGH COMPLEX 45 MIN: CPT

## 2024-12-26 PROCEDURE — 97167 OT EVAL HIGH COMPLEX 60 MIN: CPT

## 2024-12-26 PROCEDURE — 83735 ASSAY OF MAGNESIUM: CPT | Performed by: PHYSICIAN ASSISTANT

## 2024-12-26 PROCEDURE — 99233 SBSQ HOSP IP/OBS HIGH 50: CPT

## 2024-12-26 PROCEDURE — 84100 ASSAY OF PHOSPHORUS: CPT | Performed by: PHYSICIAN ASSISTANT

## 2024-12-26 PROCEDURE — 85027 COMPLETE CBC AUTOMATED: CPT | Performed by: PHYSICIAN ASSISTANT

## 2024-12-26 PROCEDURE — 82948 REAGENT STRIP/BLOOD GLUCOSE: CPT

## 2024-12-26 RX ORDER — INSULIN LISPRO 100 [IU]/ML
1-5 INJECTION, SOLUTION INTRAVENOUS; SUBCUTANEOUS
Status: DISCONTINUED | OUTPATIENT
Start: 2024-12-26 | End: 2024-12-27 | Stop reason: HOSPADM

## 2024-12-26 RX ORDER — QUETIAPINE FUMARATE 25 MG/1
25 TABLET, FILM COATED ORAL
Status: DISCONTINUED | OUTPATIENT
Start: 2024-12-26 | End: 2024-12-27 | Stop reason: HOSPADM

## 2024-12-26 RX ADMIN — AMANTADINE HYDROCHLORIDE 100 MG: 100 CAPSULE ORAL at 09:12

## 2024-12-26 RX ADMIN — HYDROCHLOROTHIAZIDE 25 MG: 25 TABLET ORAL at 09:12

## 2024-12-26 RX ADMIN — LEVETIRACETAM 1500 MG: 500 TABLET, FILM COATED ORAL at 21:31

## 2024-12-26 RX ADMIN — Medication 5 MG: at 21:30

## 2024-12-26 RX ADMIN — TICAGRELOR 90 MG: 90 TABLET ORAL at 21:30

## 2024-12-26 RX ADMIN — METOPROLOL SUCCINATE 50 MG: 50 TABLET, EXTENDED RELEASE ORAL at 09:12

## 2024-12-26 RX ADMIN — AMANTADINE HYDROCHLORIDE 100 MG: 100 CAPSULE ORAL at 17:13

## 2024-12-26 RX ADMIN — PANTOPRAZOLE SODIUM 20 MG: 20 TABLET, DELAYED RELEASE ORAL at 09:12

## 2024-12-26 RX ADMIN — DONEPEZIL HYDROCHLORIDE 10 MG: 5 TABLET ORAL at 09:12

## 2024-12-26 RX ADMIN — QUETIAPINE FUMARATE 25 MG: 25 TABLET ORAL at 21:31

## 2024-12-26 RX ADMIN — ATORVASTATIN CALCIUM 40 MG: 40 TABLET, FILM COATED ORAL at 09:12

## 2024-12-26 RX ADMIN — MIRTAZAPINE 7.5 MG: 15 TABLET, FILM COATED ORAL at 21:31

## 2024-12-26 RX ADMIN — TAMSULOSIN HYDROCHLORIDE 0.4 MG: 0.4 CAPSULE ORAL at 17:13

## 2024-12-26 RX ADMIN — ENOXAPARIN SODIUM 40 MG: 40 INJECTION SUBCUTANEOUS at 09:12

## 2024-12-26 RX ADMIN — TICAGRELOR 90 MG: 90 TABLET ORAL at 09:12

## 2024-12-26 RX ADMIN — ASPIRIN 81 MG CHEWABLE TABLET 81 MG: 81 TABLET CHEWABLE at 09:12

## 2024-12-26 RX ADMIN — LEVETIRACETAM 1500 MG: 500 TABLET, FILM COATED ORAL at 09:12

## 2024-12-26 NOTE — PLAN OF CARE
Problem: PHYSICAL THERAPY ADULT  Goal: Performs mobility at highest level of function for planned discharge setting.  See evaluation for individualized goals.  Description: Treatment/Interventions: Functional transfer training, LE strengthening/ROM, Therapeutic exercise, Elevations, Endurance training, Cognitive reorientation, Patient/family training, Bed mobility, Gait training, Compensatory technique education, Spoke to nursing, OT          See flowsheet documentation for full assessment, interventions and recommendations.  Note: Prognosis: Good  Problem List: Decreased strength, Decreased range of motion, Decreased endurance, Impaired balance, Decreased mobility, Decreased coordination, Decreased safety awareness, Impaired hearing  Assessment: Constanza Zhu is a 64 y.o. Male who presents to [facility] on 12/24/24 from home w/ c/o acute agitation followed by LOC and diagnosis of altered mental status. Orders for PT eval and treat received, w/ activity orders of up and out of bed as tolerated and fall precautions. Pt presents w/ comorbidities of recurrent strokes (likely atheroembolic in setting of multiple stenosis), b/l carotid stenosis s/p L ICA stenting 5/2023 and s/p R carotid stenting 5/2024, iliac artery stenosis, focal epilepsy, anxiety and depression, HTN, HLD. At baseline, pt mobilizes independently w/ a RW, and reports 0 falls in the last 6 months. Upon evaluation, pt presents w/ the following deficits: weakness, impaired coordination, impaired balance, impaired vision, and decreased endurance. Pt currently demonstrates supervision for bed mobility, CG assist for transfers, and min assist with a RW for ambulation. The patient's AM-PAC Basic Mobility Inpatient Short Form Raw Score is 17. A Raw score of greater than/ equal to 17 suggests the patient may benefit from discharge to post-acute rehabilitation services. Please also refer to the recommendation of the Physical Therapist for safe discharge planning.  Based on current status, Level 3 rehab intensity is recommended at time of discharge. At this time, pt would benefit from continued skilled PT services, in the acute care setting, in order to address the abovementioned deficits and maximize independence and functional gains prior to discharge.        Rehab Resource Intensity Level, PT: III (Minimum Resource Intensity)    See flowsheet documentation for full assessment.

## 2024-12-26 NOTE — ASSESSMENT & PLAN NOTE
Lab Results   Component Value Date    HGBA1C 5.7 (H) 12/24/2024       Recent Labs     12/25/24  1218 12/25/24  1650 12/25/24 2054 12/26/24  0718   POCGLU 87 113 111 95       Blood Sugar Average: Last 72 hrs:  (P) 97.375  Monitor fingersticks, ISS as needed

## 2024-12-26 NOTE — TELEPHONE ENCOUNTER
PATIENT OF YASH LOV 8/31/2023...    STILL ADMITTED:12/24/2024 - present (2 days)  [facility]        [facility] Discharge  Constanza Zhu will need follow-up in in 4 weeks with epilepsy team for Patient established with outpatient epilepsy team admitted with breakthrough seizure in 30 minute appointment. They will not require outpatient neurological testing.    He has seen Zainab in the past, but I know she is no longer working with us. He has an appointment with Dr. Steele in March, but for this hospital follow up, he will need to see epilepsy.

## 2024-12-26 NOTE — CASE MANAGEMENT
Case Management Discharge Planning Note    Patient name Constanza Zhu  Location /-01 MRN [id number]  : 1960 Date 2024       Current Admission Date: 2024  Current Admission Diagnosis:Altered mental status   Patient Active Problem List    Diagnosis Date Noted Date Diagnosed    Closed nondisplaced fracture of phalanx of right great toe 2024     Cerebral ventriculomegaly 10/10/2024     Iliac artery stenosis, left ([facility]) 2024     Carotid stenosis, bilateral 2024     PVC (premature ventricular contraction) 2024     Acute stroke due to ischemia ([facility]) 2024     Internal carotid artery stenosis, left 2024     Acute CVA (cerebrovascular accident) ([facility]) 2024     COVID 2024     Internal carotid artery stent present 2024     Vision problem 2024     Dry eyes 2024     Focal epilepsy ([facility]) 2023     Claudication of both lower extremities ([facility]) 2023     Type 2 diabetes mellitus with other diabetic ophthalmic complication ([facility]) 2023     Aphasia 2023    Atherosclerosis of native arteries of extremities with intermittent claudication, bilateral legs ([facility]) 2023    Benign prostatic hyperplasia with lower urinary tract symptoms 2023    Possible NPH (normal pressure hydrocephalus) ([facility]) 2023     Muscle spasms of both lower extremities 2023     Multiple thyroid nodules 2023     Abnormal laboratory test 05/15/2023     History of tobacco use 2023     Chronic ischemic left MCA stroke 2023     Infrarenal abdominal aortic aneurysm (AAA) without rupture ([facility]) 2023     Tachycardia 2023     Prediabetes 2023     SIRS (systemic inflammatory response syndrome) ([facility]) 2023     Recurrent strokes ([facility]) 2023     Hyponatremia 2023     Middle cerebral artery stenosis, right 2023     Altered mental status 2023      Chronic low back pain 04/16/2023     Hypertensive encephalopathy, transient 01/12/2023     Snoring 08/10/2020     Memory deficits 08/10/2020     Status post placement of implantable loop recorder 04/06/2019     History of prediabetes 04/03/2019     Anxiety and depression 03/29/2019     Insomnia 03/29/2019     Fall 03/28/2019     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) 03/28/2019     Urinary retention 03/27/2019     At risk for venous thromboembolism (VTE) 03/26/2019     Hypertriglyceridemia 03/26/2019     Nicotine dependence 03/26/2019     Left carotid stenosis 03/26/2019     Presbyopia 03/26/2019     History of stroke 03/19/2019     Headache 03/19/2019     Primary hypertension 03/19/2019     GERD (gastroesophageal reflux disease) 03/19/2019       LOS (days): 0  Geometric Mean LOS (GMLOS) (days):   Days to GMLOS:     OBJECTIVE:            Current admission status: Observation   Preferred Pharmacy:   [facility] [address] [place] [id number]  Phone: [phone] Fax: [phone]    [facility] - [place], [place] - [address] [place] [address]  [address] [facility] [address]  Phone: [phone] Fax: [phone]    Primary Care Provider: ERIC Calixto    Primary Insurance: BLUE CROSS  Secondary Insurance:     DISCHARGE DETAILS:        CM left message for spouse to complete opne and discuss therapy's recommendation for HH. Awaiting call back.

## 2024-12-26 NOTE — PHYSICAL THERAPY NOTE
"                                                                                  PHYSICAL THERAPY EVALUATION NOTE      Patient Name: Constanza Zhu  Today's Date: 2024    AGE:   64 y.o.  Mrn:   [id number]  ADMIT DX:  Altered mental status [R41.82]  Closed nondisplaced fracture of distal phalanx of right great toe, initial encounter [S92.424A]    Past Medical History:   Diagnosis Date    Depression     Diabetes mellitus (HCC)     patient denies    Dyslipidemia 2019    GERD (gastroesophageal reflux disease)     Hyperlipidemia     Hypertension     Prediabetes     Prediabetes 2019    Stroke ([facility])      Length Of Stay: 0  PHYSICAL THERAPY EVALUATION :   Patient's identity confirmed via 2 patient identifiers (full name and ) at start of session       24 1334   PT Last Visit   PT Visit Date 24   Note Type   Note type Evaluation   Pain Assessment   Pain Assessment Tool 0-10   Pain Score No Pain   Restrictions/Precautions   Weight Bearing Precautions Per Order No   Other Precautions Cognitive;Chair Alarm;Bed Alarm;Fall Risk;Pain  (expressive aphasia at baseline)   Home Living   Type of Home House   Home Layout One level;Performs ADLs on one level;Able to live on main level with bedroom/bathroom  (1 CHRIS)   Bathroom Shower/Tub Walk-in shower   Bathroom Toilet Standard   Home Equipment Walker;Wheelchair-manual   Prior Function   Level of [place] Independent with functional mobility;Needs assistance with ADLs;Needs assistance with IADLS   Lives With Spouse   Receives Help From Family;Other (Comment)  (Caregivers/aids 3x per week. Pt reports he is never home alone)   IADLs Family/Friend/Other provides transportation;Family/Friend/Other provides medication management;Family/Friend/Other provides meals   Falls in the last 6 months 0  (\"not really.\")   Vocational Retired   General   Family/Caregiver Present No   Cognition   Overall Cognitive Status Impaired   Arousal/Participation Alert "   Attention Attends with cues to redirect   Orientation Level Oriented to person;Oriented to place;Disoriented to time;Disoriented to situation   Memory Decreased recall of precautions;Decreased recall of recent events   Following Commands Follows one step commands with increased time or repetition   Comments (S)  Pt with (+) agitation during session, yelling, cursing, slamming walker into the ground about wanting to go home.   RUE Assessment   RUE Assessment WFL   LUE Assessment   LUE Assessment WFL   RLE Assessment   RLE Assessment WFL  ((+) hamstring tightness)   LLE Assessment   LLE Assessment WFL  ((+) hamstring tightness)   Vision-Basic Assessment   Current Vision Other (Comment)  (h/o R visual cut from prior CVA)   Light Touch   RLE Light Touch Grossly intact   LLE Light Touch Grossly intact   Bed Mobility   Supine to Sit 5  Supervision   Additional items HOB elevated;Bedrails;Increased time required;Verbal cues   Additional Comments Pt able to scoot to EOB with supervision.   Transfers   Sit to Stand 4  Minimal assistance   Additional items Assist x 1;Increased time required;Verbal cues   Stand to Sit 4  Minimal assistance  (for CG assist)   Additional items Assist x 1;Armrests;Increased time required;Verbal cues   Ambulation/Elevation   Gait pattern   (reciprocal, B knees flexed, mild flexed trunk. cues to increase proximity to RW, mild impulsivity with turns, decreased safety awareness)   Gait Assistance 4  Minimal assist   Additional items Assist x 1;Verbal cues;Tactile cues   Assistive Device Rolling walker   Distance 35ft   Ambulation/Elevation Additional Comments Ambulation trial limited by pt. Pt reported he didn't want to walk anymore. Once back inside room, pt became highly agitated, yelling, cursing, and slamming RW into the ground.   Balance   Static Sitting Good   Dynamic Sitting Fair +   Static Standing Fair   Dynamic Standing Fair -   Ambulatory Fair -   Activity Tolerance   Activity Tolerance  Treatment limited secondary to agitation   Medical Staff Made Aware MEY Fulton   Nurse Made Aware GARRETT Gonzales   Assessment   Prognosis Good   Problem List Decreased strength;Decreased range of motion;Decreased endurance;Impaired balance;Decreased mobility;Decreased coordination;Decreased safety awareness;Impaired hearing   Assessment Constanza Zhu is a 64 y.o. Male who presents to [facility] on 12/24/24 from home w/ c/o acute agitation followed by LOC and diagnosis of altered mental status. Orders for PT eval and treat received, w/ activity orders of up and out of bed as tolerated and fall precautions. Pt presents w/ comorbidities of recurrent strokes (likely atheroembolic in setting of multiple stenosis), b/l carotid stenosis s/p L ICA stenting 5/2023 and s/p R carotid stenting 5/2024, iliac artery stenosis, focal epilepsy, anxiety and depression, HTN, HLD. At baseline, pt mobilizes independently w/ a RW, and reports 0 falls in the last 6 months. Upon evaluation, pt presents w/ the following deficits: weakness, impaired coordination, impaired balance, impaired vision, and decreased endurance. Pt currently demonstrates supervision for bed mobility, CG assist for transfers, and min assist with a RW for ambulation. The patient's AM-PAC Basic Mobility Inpatient Short Form Raw Score is 17. A Raw score of greater than/ equal to 17 suggests the patient may benefit from discharge to post-acute rehabilitation services. Please also refer to the recommendation of the Physical Therapist for safe discharge planning. Based on current status, Level 3 rehab intensity is recommended at time of discharge. At this time, pt would benefit from continued skilled PT services, in the acute care setting, in order to address the abovementioned deficits and maximize independence and functional gains prior to discharge.   Goals   Patient Goals to go home   STG Expiration Date 01/09/25   Short Term Goal #1 1. supine to sit independently. 2. sit to  stand modified independently. 3. ambulate with RW >75ft with supervision.   PT Treatment Day 0   Plan   Treatment/Interventions Functional transfer training;LE strengthening/ROM;Therapeutic exercise;Elevations;Endurance training;Cognitive reorientation;Patient/family training;Bed mobility;Gait training;Compensatory technique education;Spoke to nursing;OT   PT Frequency 3-5x/wk   Discharge Recommendation   Rehab Resource Intensity Level, PT III (Minimum Resource Intensity)   AM-PAC Basic Mobility Inpatient   Turning in Flat Bed Without Bedrails 3   Lying on Back to Sitting on Edge of Flat Bed Without Bedrails 3   Moving Bed to Chair 3   Standing Up From Chair Using Arms 3   Walk in Room 3   Climb 3-5 Stairs With Railing 2   Basic Mobility Inpatient Raw Score 17   Basic Mobility Standardized Score 39.67   [facility] Highest Level Of Mobility   -HL Goal 5: Stand one or more mins   -HL Achieved 7: Walk 25 feet or more   End of Consult   Patient Position at End of Consult Bedside chair;Bed/Chair alarm activated;All needs within reach  (BLE elevated)       The patient's AM-PAC Basic Mobility Inpatient Short Form Raw Score is 17, Standardized Score is 39.67. A standardized score greater than 38.32 (raw score of 16) suggests the patient may benefit from discharge to home which may not coincide with above PT recommendations. However please refer to therapist recommendation for discharge planning given other factors that may influence destination.      Pt would benefit from skilled inpatient PT during this admission in order to facilitate progress towards goals to maximize functional independence    Brady White, PT

## 2024-12-26 NOTE — PLAN OF CARE
"  Problem: OCCUPATIONAL THERAPY ADULT  Goal: Performs self-care activities at highest level of function for planned discharge setting.  See evaluation for individualized goals.  Description: Treatment Interventions: ADL retraining, Functional transfer training, UE strengthening/ROM, Endurance training, Cognitive reorientation, Patient/family training, Equipment evaluation/education, Compensatory technique education, Continued evaluation          See flowsheet documentation for full assessment, interventions and recommendations.   Note: Limitation: Decreased ADL status, Decreased UE ROM, Decreased UE strength, Decreased Safe judgement during ADL, Decreased cognition, Decreased endurance, Decreased self-care trans, Decreased high-level ADLs  Prognosis: Fair  Assessment: Pt is a 64 y.o. male seen for OT evaluation at [facility], admitted 12/24/2024 w/ Altered mental status. Extensive chart review completed by OT. 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 Personal factors affecting pt at time of IE include: steps to enter environment, behavioral pattern, difficulty performing ADLS, difficulty performing IADLS , limited insight into deficits, compliance, health management , and environment. PTA pt was living in a 1 SH w/ 1 CHRIS, was  A w/ ADLs and A w/ IADLS, and IND with functional mobility with use of RW. Pt states his spouse assists him and he has caregivers 3x/week who also assist with ADLs. Upon OT IE pt demonstrated  supervision for bed mobility, Min Ax1 for functional transfers, Min Ax1 for functional mobility, Valery for UB ADLs and ModA for LB ADLS " 2* the following deficits impacting occupational performance: weakness, decreased ROM, decreased strength, decreased balance, decreased tolerance, impaired memory, impaired sequencing, impaired problem solving, impulsivity, decreased safety awareness, and agitation . Pt demonstrates the need for continued skilled OT tx focused on grooming, bathing/shower, toilet hygiene, dressing, functional mobility, and clothing management during their stay in the hospital to address the stated deficits and maximize their level of functional independence w/ their ADLS and functional mobility. The patient's raw score on the AM-PAC Daily Activity inpatient short form is 20, standardized score is 42.03, greater than 39.4. Patients at this level are likely to benefit from DC to home. However, please refer to therapist recommendation for discharge planning given other factors that may influence destination. At this time, OT recommendations at time of discharge are DC with Level III - Low Rehab Resource Intensity resources.     Rehab Resource Intensity Level, OT: III (Minimum Resource Intensity)

## 2024-12-26 NOTE — TELEPHONE ENCOUNTER
Hello,     I have Scheduled with BENOIT SALGADO, [facility], , 1/22/25, 12:30 PM HFU LONG.       Thank you,     Rosi

## 2024-12-26 NOTE — PLAN OF CARE
Problem: Potential for Falls  Goal: Patient will remain free of falls  Description: INTERVENTIONS:  - Educate patient/family on patient safety including physical limitations  - Instruct patient to call for assistance with activity   - Consult OT/PT to assist with strengthening/mobility   - Keep Call bell within reach  - Keep bed low and locked with side rails adjusted as appropriate  - Keep care items and personal belongings within reach  - Initiate and maintain comfort rounds  - Make Fall Risk Sign visible to staff  - Offer Toileting every x Hours, in advance of need  - Initiate/Maintain xalarm  - Obtain necessary fall risk management equipment: x  - Apply yellow socks and bracelet for high fall risk patients  - Consider moving patient to room near nurses station  Outcome: Progressing     Problem: PAIN - ADULT  Goal: Verbalizes/displays adequate comfort level or baseline comfort level  Description: Interventions:  - Encourage patient to monitor pain and request assistance  - Assess pain using appropriate pain scale  - Administer analgesics based on type and severity of pain and evaluate response  - Implement non-pharmacological measures as appropriate and evaluate response  - Consider cultural and social influences on pain and pain management  - Notify physician/advanced practitioner if interventions unsuccessful or patient reports new pain  Outcome: Progressing     Problem: INFECTION - ADULT  Goal: Absence or prevention of progression during hospitalization  Description: INTERVENTIONS:  - Assess and monitor for signs and symptoms of infection  - Monitor lab/diagnostic results  - Monitor all insertion sites, i.e. indwelling lines, tubes, and drains  - Monitor endotracheal if appropriate and nasal secretions for changes in amount and color  - [place] appropriate cooling/warming therapies per order  - Administer medications as ordered  - Instruct and encourage patient and family to use good hand hygiene  technique  - Identify and instruct in appropriate isolation precautions for identified infection/condition  Outcome: Progressing  Goal: Absence of fever/infection during neutropenic period  Description: INTERVENTIONS:  - Monitor WBC    Outcome: Progressing     Problem: SAFETY ADULT  Goal: Patient will remain free of falls  Description: INTERVENTIONS:  - Educate patient/family on patient safety including physical limitations  - Instruct patient to call for assistance with activity   - Consult OT/PT to assist with strengthening/mobility   - Keep Call bell within reach  - Keep bed low and locked with side rails adjusted as appropriate  - Keep care items and personal belongings within reach  - Initiate and maintain comfort rounds  - Make Fall Risk Sign visible to staff  - Offer Toileting every x Hours, in advance of need  - Initiate/Maintain xalarm  - Obtain necessary fall risk management equipment: x  - Apply yellow socks and bracelet for high fall risk patients  - Consider moving patient to room near nurses station  Outcome: Progressing  Goal: Maintain or return to baseline ADL function  Description: INTERVENTIONS:  -  Assess patient's ability to carry out ADLs; assess patient's baseline for ADL function and identify physical deficits which impact ability to perform ADLs (bathing, care of mouth/teeth, toileting, grooming, dressing, etc.)  - Assess/evaluate cause of self-care deficits   - Assess range of motion  - Assess patient's mobility; develop plan if impaired  - Assess patient's need for assistive devices and provide as appropriate  - Encourage maximum independence but intervene and supervise when necessary  - Involve family in performance of ADLs  - Assess for home care needs following discharge   - Consider OT consult to assist with ADL evaluation and planning for discharge  - Provide patient education as appropriate  Outcome: Progressing  Goal: Maintains/Returns to pre admission functional level  Description:  INTERVENTIONS:  - Perform AM-PAC 6 Click Basic Mobility/ Daily Activity assessment daily.  - Set and communicate daily mobility goal to care team and patient/family/caregiver.   - Collaborate with rehabilitation services on mobility goals if consulted  - Perform Range of Motion x times a day.  - Reposition patient every x hours.  - Dangle patient x times a day  - Stand patient x times a day  - Ambulate patient x times a day  - Out of bed to chair x times a day   - Out of bed for meals x times a day  - Out of bed for toileting  - Record patient progress and toleration of activity level   Outcome: Progressing     Problem: DISCHARGE PLANNING  Goal: Discharge to home or other facility with appropriate resources  Description: INTERVENTIONS:  - Identify barriers to discharge w/patient and caregiver  - Arrange for needed discharge resources and transportation as appropriate  - Identify discharge learning needs (meds, wound care, etc.)  - Arrange for interpretive services to assist at discharge as needed  - Refer to Case Management Department for coordinating discharge planning if the patient needs post-hospital services based on physician/advanced practitioner order or complex needs related to functional status, cognitive ability, or social support system  Outcome: Progressing     Problem: Knowledge Deficit  Goal: Patient/family/caregiver demonstrates understanding of disease process, treatment plan, medications, and discharge instructions  Description: Complete learning assessment and assess knowledge base.  Interventions:  - Provide teaching at level of understanding  - Provide teaching via preferred learning methods  Outcome: Progressing     Problem: Nutrition/Hydration-ADULT  Goal: Nutrient/Hydration intake appropriate for improving, restoring or maintaining nutritional needs  Description: Monitor and assess patient's nutrition/hydration status for malnutrition. Collaborate with interdisciplinary team and initiate plan  and interventions as ordered.  Monitor patient's weight and dietary intake as ordered or per policy. Utilize nutrition screening tool and intervene as necessary. Determine patient's food preferences and provide high-protein, high-caloric foods as appropriate.     INTERVENTIONS:  - Monitor oral intake, urinary output, labs, and treatment plans  - Assess nutrition and hydration status and recommend course of action  - Evaluate amount of meals eaten  - Assist patient with eating if necessary   - Allow adequate time for meals  - Recommend/ encourage appropriate diets, oral nutritional supplements, and vitamin/mineral supplements  - Order, calculate, and assess calorie counts as needed  - Recommend, monitor, and adjust tube feedings and TPN/PPN based on assessed needs  - Assess need for intravenous fluids  - Provide specific nutrition/hydration education as appropriate  - Include patient/family/caregiver in decisions related to nutrition  Outcome: Progressing     Problem: MOBILITY - ADULT  Goal: Maintain or return to baseline ADL function  Description: INTERVENTIONS:  -  Assess patient's ability to carry out ADLs; assess patient's baseline for ADL function and identify physical deficits which impact ability to perform ADLs (bathing, care of mouth/teeth, toileting, grooming, dressing, etc.)  - Assess/evaluate cause of self-care deficits   - Assess range of motion  - Assess patient's mobility; develop plan if impaired  - Assess patient's need for assistive devices and provide as appropriate  - Encourage maximum independence but intervene and supervise when necessary  - Involve family in performance of ADLs  - Assess for home care needs following discharge   - Consider OT consult to assist with ADL evaluation and planning for discharge  - Provide patient education as appropriate  Outcome: Progressing  Goal: Maintains/Returns to pre admission functional level  Description: INTERVENTIONS:  - Perform AM-PAC 6 Click Basic  Mobility/ Daily Activity assessment daily.  - Set and communicate daily mobility goal to care team and patient/family/caregiver.   - Collaborate with rehabilitation services on mobility goals if consulted  - Perform Range of Motion x times a day.  - Reposition patient every x hours.  - Dangle patient x times a day  - Stand patient x times a day  - Ambulate patient x times a day  - Out of bed to chair x times a day   - Out of bed for meals x times a day  - Out of bed for toileting  - Record patient progress and toleration of activity level   Outcome: Progressing     Problem: Prexisting or High Potential for Compromised Skin Integrity  Goal: Skin integrity is maintained or improved  Description: INTERVENTIONS:  - Identify patients at risk for skin breakdown  - Assess and monitor skin integrity  - Assess and monitor nutrition and hydration status  - Monitor labs   - Assess for incontinence   - Turn and reposition patient  - Assist with mobility/ambulation  - Relieve pressure over bony prominences  - Avoid friction and shearing  - Provide appropriate hygiene as needed including keeping skin clean and dry  - Evaluate need for skin moisturizer/barrier cream  - Collaborate with interdisciplinary team   - Patient/family teaching  - Consider wound care consult   Outcome: Progressing

## 2024-12-26 NOTE — ASSESSMENT & PLAN NOTE
Likely occurred in agitated state.   Currently non-tender to palpation   Podiatry was ordered for right great toe fracture.  Likely chronic feared.  Weightbearing as tolerated.

## 2024-12-26 NOTE — PLAN OF CARE
Problem: Potential for Falls  Goal: Patient will remain free of falls  Description: INTERVENTIONS:  - Educate patient/family on patient safety including physical limitations  - Instruct patient to call for assistance with activity   - Consult OT/PT to assist with strengthening/mobility   - Keep Call bell within reach  - Keep bed low and locked with side rails adjusted as appropriate  - Keep care items and personal belongings within reach  - Initiate and maintain comfort rounds  - Make Fall Risk Sign visible to staff  - Offer Toileting every  Hours, in advance of need  - Initiate/Maintain alarm  - Obtain necessary fall risk management equipment:   - Apply yellow socks and bracelet for high fall risk patients  - Consider moving patient to room near nurses station  Outcome: Progressing     Problem: PAIN - ADULT  Goal: Verbalizes/displays adequate comfort level or baseline comfort level  Description: Interventions:  - Encourage patient to monitor pain and request assistance  - Assess pain using appropriate pain scale  - Administer analgesics based on type and severity of pain and evaluate response  - Implement non-pharmacological measures as appropriate and evaluate response  - Consider cultural and social influences on pain and pain management  - Notify physician/advanced practitioner if interventions unsuccessful or patient reports new pain  Outcome: Progressing     Problem: INFECTION - ADULT  Goal: Absence or prevention of progression during hospitalization  Description: INTERVENTIONS:  - Assess and monitor for signs and symptoms of infection  - Monitor lab/diagnostic results  - Monitor all insertion sites, i.e. indwelling lines, tubes, and drains  - Monitor endotracheal if appropriate and nasal secretions for changes in amount and color  - [place] appropriate cooling/warming therapies per order  - Administer medications as ordered  - Instruct and encourage patient and family to use good hand hygiene technique  -  Identify and instruct in appropriate isolation precautions for identified infection/condition  Outcome: Progressing  Goal: Absence of fever/infection during neutropenic period  Description: INTERVENTIONS:  - Monitor WBC    Outcome: Progressing     Problem: SAFETY ADULT  Goal: Patient will remain free of falls  Description: INTERVENTIONS:  - Educate patient/family on patient safety including physical limitations  - Instruct patient to call for assistance with activity   - Consult OT/PT to assist with strengthening/mobility   - Keep Call bell within reach  - Keep bed low and locked with side rails adjusted as appropriate  - Keep care items and personal belongings within reach  - Initiate and maintain comfort rounds  - Make Fall Risk Sign visible to staff  - Offer Toileting every  Hours, in advance of need  - Initiate/Maintainalarm  - Obtain necessary fall risk management equipment:   - Apply yellow socks and bracelet for high fall risk patients  - Consider moving patient to room near nurses station  Outcome: Progressing  Goal: Maintain or return to baseline ADL function  Description: INTERVENTIONS:  -  Assess patient's ability to carry out ADLs; assess patient's baseline for ADL function and identify physical deficits which impact ability to perform ADLs (bathing, care of mouth/teeth, toileting, grooming, dressing, etc.)  - Assess/evaluate cause of self-care deficits   - Assess range of motion  - Assess patient's mobility; develop plan if impaired  - Assess patient's need for assistive devices and provide as appropriate  - Encourage maximum independence but intervene and supervise when necessary  - Involve family in performance of ADLs  - Assess for home care needs following discharge   - Consider OT consult to assist with ADL evaluation and planning for discharge  - Provide patient education as appropriate  Outcome: Progressing  Goal: Maintains/Returns to pre admission functional level  Description: INTERVENTIONS:  -  Perform AM-PAC 6 Click Basic Mobility/ Daily Activity assessment daily.  - Set and communicate daily mobility goal to care team and patient/family/caregiver.   - Collaborate with rehabilitation services on mobility goals if consulted  - Perform Range of Motion  times a day.  - Reposition patient every  hours.  - Dangle patient times a day  - Stand patient  times a day  - Ambulate patient  times a day  - Out of bed to chair  times a day   - Out of bed for meals  times a day  - Out of bed for toileting  - Record patient progress and toleration of activity level   Outcome: Progressing     Problem: DISCHARGE PLANNING  Goal: Discharge to home or other facility with appropriate resources  Description: INTERVENTIONS:  - Identify barriers to discharge w/patient and caregiver  - Arrange for needed discharge resources and transportation as appropriate  - Identify discharge learning needs (meds, wound care, etc.)  - Arrange for interpretive services to assist at discharge as needed  - Refer to Case Management Department for coordinating discharge planning if the patient needs post-hospital services based on physician/advanced practitioner order or complex needs related to functional status, cognitive ability, or social support system  Outcome: Progressing     Problem: Knowledge Deficit  Goal: Patient/family/caregiver demonstrates understanding of disease process, treatment plan, medications, and discharge instructions  Description: Complete learning assessment and assess knowledge base.  Interventions:  - Provide teaching at level of understanding  - Provide teaching via preferred learning methods  Outcome: Progressing     Problem: Nutrition/Hydration-ADULT  Goal: Nutrient/Hydration intake appropriate for improving, restoring or maintaining nutritional needs  Description: Monitor and assess patient's nutrition/hydration status for malnutrition. Collaborate with interdisciplinary team and initiate plan and interventions as  ordered.  Monitor patient's weight and dietary intake as ordered or per policy. Utilize nutrition screening tool and intervene as necessary. Determine patient's food preferences and provide high-protein, high-caloric foods as appropriate.     INTERVENTIONS:  - Monitor oral intake, urinary output, labs, and treatment plans  - Assess nutrition and hydration status and recommend course of action  - Evaluate amount of meals eaten  - Assist patient with eating if necessary   - Allow adequate time for meals  - Recommend/ encourage appropriate diets, oral nutritional supplements, and vitamin/mineral supplements  - Order, calculate, and assess calorie counts as needed  - Recommend, monitor, and adjust tube feedings and TPN/PPN based on assessed needs  - Assess need for intravenous fluids  - Provide specific nutrition/hydration education as appropriate  - Include patient/family/caregiver in decisions related to nutrition  Outcome: Progressing     Problem: MOBILITY - ADULT  Goal: Maintain or return to baseline ADL function  Description: INTERVENTIONS:  -  Assess patient's ability to carry out ADLs; assess patient's baseline for ADL function and identify physical deficits which impact ability to perform ADLs (bathing, care of mouth/teeth, toileting, grooming, dressing, etc.)  - Assess/evaluate cause of self-care deficits   - Assess range of motion  - Assess patient's mobility; develop plan if impaired  - Assess patient's need for assistive devices and provide as appropriate  - Encourage maximum independence but intervene and supervise when necessary  - Involve family in performance of ADLs  - Assess for home care needs following discharge   - Consider OT consult to assist with ADL evaluation and planning for discharge  - Provide patient education as appropriate  Outcome: Progressing  Goal: Maintains/Returns to pre admission functional level  Description: INTERVENTIONS:  - Perform AM-PAC 6 Click Basic Mobility/ Daily Activity  assessment daily.  - Set and communicate daily mobility goal to care team and patient/family/caregiver.   - Collaborate with rehabilitation services on mobility goals if consulted  - Perform Range of Motion  times a day.  - Reposition patient every  hours.  - Dangle patient  times a day  - Stand patient  times a day  - Ambulate patient  times a day  - Out of bed to chair times a day   - Out of bed for meals  times a day  - Out of bed for toileting  - Record patient progress and toleration of activity level   Outcome: Progressing     Problem: Prexisting or High Potential for Compromised Skin Integrity  Goal: Skin integrity is maintained or improved  Description: INTERVENTIONS:  - Identify patients at risk for skin breakdown  - Assess and monitor skin integrity  - Assess and monitor nutrition and hydration status  - Monitor labs   - Assess for incontinence   - Turn and reposition patient  - Assist with mobility/ambulation  - Relieve pressure over bony prominences  - Avoid friction and shearing  - Provide appropriate hygiene as needed including keeping skin clean and dry  - Evaluate need for skin moisturizer/barrier cream  - Collaborate with interdisciplinary team   - Patient/family teaching  - Consider wound care consult   Outcome: Progressing

## 2024-12-26 NOTE — ASSESSMENT & PLAN NOTE
Patient presents with acute agitation followed by loss of consciousness, increased extremity tone  MRI 9/2024 shows:1. Punctate foci of restricted diffusion within the left occipital region, reflecting acute-subacute infarct.   2. Moderate to advanced generalized cortical atrophy. There is ventricular dilation which is greater than/out of proportion to the degree of cortical atrophy and can be seen in the setting of normal pressure hydrocephalus.   3. Left parieto-occipital encephalomalacia.   Patient received 1000mg IV keppra in ER  Patient appears to have dementia at baseline.  Has cognitive decline as well as needing help with his ADLs  He had a delirium episode the other day where he was aggravated and confused which ultimately brought him to the hospital    Neuro checks q4 hrs  Neuro on board and patient has prior recurrent strokes.  Possible postictal however no clear indication to adjust AED at this time.  Continue home Keppra 1500 mg BID  Geriatrics consult in the outpatient setting  Will start Seroquel at night sleep-wake cycle  Urinary retention protocol  Continue home ASA, Brilinta, statin   Hold sedating medications  NPO for now unless passes dysphagia screen   PT/OT, medically clear for discharge starting tomorrow

## 2024-12-26 NOTE — OCCUPATIONAL THERAPY NOTE
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)    Focal epilepsy (HCC)    Closed nondisplaced fracture of phalanx of right great toe    Past Medical History  Past Medical History:   Diagnosis Date    Depression     Diabetes mellitus (HCC)     patient denies    Dyslipidemia 03/26/2019    GERD (gastroesophageal reflux disease)     Hyperlipidemia     Hypertension     Prediabetes     Prediabetes 04/03/2019    Stroke (HCC)      Past Surgical History  Past Surgical History:   Procedure Laterality Date    ARTHROSCOPIC REPAIR ACL Left     BACK SURGERY      twice    CARPAL TUNNEL RELEASE Right     IR CEREBRAL ANGIOGRAPHY  05/04/2023    IR STROKE ALERT  03/19/2019    SHOULDER SURGERY Left     US GUIDED THYROID BIOPSY  11/21/2023 12/26/24 1335   OT Last Visit   OT Visit Date 12/26/24   Note Type   Note type Evaluation   Pain Assessment   Pain Assessment Tool 0-10   Pain Score No Pain   Restrictions/Precautions   Weight Bearing Precautions Per Order No   Other Precautions Cognitive;Chair Alarm;Bed Alarm;Fall Risk;Pain  (exppressive apahsia at baseline)   Home Living   Type of Home House   Home Layout One level;Able to live on main level with bedroom/bathroom;Performs ADLs on one level  (1 CHRIS)   Bathroom Shower/Tub Walk-in shower   Bathroom Toilet Standard   Home Equipment Walker;Wheelchair-manual   Prior Function   Level of [place] Independent with functional mobility;Needs assistance with ADLs;Needs assistance with IADLS   Lives With Spouse   Receives Help From Family;Other (Comment)  (Caregivers/aids 3x per week is not ever home alone)   IADLs Family/Friend/Other provides transportation;Family/Friend/Other provides  "medication management;Family/Friend/Other provides meals   Falls in the last 6 months 0  (\"not really.\")   Vocational Retired   Lifestyle   Autonomy PTA pt states he is A with ADL/IADLs, and IND with functional mobility with use of RW.  Pt's spouse assists and pt has caregivers 3x per week. R hand dominent and is able to feed himself   Reciprocal Relationships spouse   General   Family/Caregiver Present No   Subjective   Subjective \"I want to go home.\"   ADL   Eating Assistance 5  Supervision/Setup   Grooming Assistance 4  Minimal Assistance   UB Bathing Assistance 4  Minimal Assistance   LB Bathing Assistance 3  Moderate Assistance   UB Dressing Assistance 4  Minimal Assistance   LB Dressing Assistance 3  Moderate Assistance   Toileting Assistance  4  Minimal Assistance   Bed Mobility   Supine to Sit 5  Supervision   Additional items HOB elevated;Increased time required;Bedrails;Verbal cues   Additional Comments Pt able to scoot EOB with supervision   Transfers   Sit to Stand 4  Minimal assistance   Additional items Assist x 1;Increased time required;Verbal cues  (for CGA)   Stand to Sit 4  Minimal assistance   Additional items Assist x 1;Armrests;Increased time required;Verbal cues  (for CGA)   Functional Mobility   Functional Mobility 4  Minimal assistance   Additional Comments x1   Additional items Rolling walker   Balance   Static Sitting Good   Dynamic Sitting Fair +   Static Standing Fair -   Dynamic Standing Fair -   Activity Tolerance   Activity Tolerance Treatment limited secondary to agitation   Medical Staff Made Aware PT Brady   Nurse Made Aware GARRETT MORGAN Assessment   RUE Assessment WFL   LUE Assessment   LUE Assessment WFL   Hand Function   Gross Motor Coordination Functional   Fine Motor Coordination Functional   Vision-Basic Assessment   Current Vision Other (Comment)  (h/o R visual cut from prior CVA)   Psychosocial   Psychosocial (WDL) X   Patient Behaviors/Mood Aggressive verbally, " "others;Irritable   Cognition   Overall Cognitive Status Impaired   Arousal/Participation Alert   Attention Attends with cues to redirect   Orientation Level Oriented to person;Oriented to place;Disoriented to time;Disoriented to situation   Memory Decreased recall of precautions;Decreased recall of recent events   Following Commands Follows one step commands with increased time or repetition   Comments (S)  Pt irritable with therapy staff, yelling, cursing, and slamming his walker on the ground about wanting to go home; pt unable to state   (H/o expressive aphasia at baseline)   Assessment   Limitation Decreased ADL status;Decreased UE ROM;Decreased UE strength;Decreased Safe judgement during ADL;Decreased cognition;Decreased endurance;Decreased self-care trans;Decreased high-level ADLs   Prognosis Fair   Assessment Pt is a 64 y.o. male seen for OT evaluation at [facility], admitted 2024 w/ Altered mental status. Extensive chart review completed by OT. 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 Personal factors affecting pt at time of IE include: steps to enter environment, behavioral pattern, difficulty performing ADLS, difficulty performing IADLS , limited insight into deficits, compliance, health management , and environment. PTA pt was living in a 1 SH w/ 1 CHRIS, was  A w/ ADLs and A w/ IADLS, and IND with functional mobility with use of RW. Pt states his spouse assists him and he has caregivers 3x/week who also assist with ADLs. Upon OT IE pt demonstrated  supervision for bed mobility, Min Ax1 for functional transfers, Min Ax1 for " functional mobility, Valery for UB ADLs and ModA for LB ADLS 2* the following deficits impacting occupational performance: weakness, decreased ROM, decreased strength, decreased balance, decreased tolerance, impaired memory, impaired sequencing, impaired problem solving, impulsivity, decreased safety awareness, and agitation . Pt demonstrates the need for continued skilled OT tx focused on grooming, bathing/shower, toilet hygiene, dressing, functional mobility, and clothing management during their stay in the hospital to address the stated deficits and maximize their level of functional independence w/ their ADLS and functional mobility. The patient's raw score on the AM-PAC Daily Activity inpatient short form is 20, standardized score is 42.03, greater than 39.4. Patients at this level are likely to benefit from DC to home. However, please refer to therapist recommendation for discharge planning given other factors that may influence destination. At this time, OT recommendations at time of discharge are DC with Level III - Low Rehab Resource Intensity resources.   Goals   Patient Goals To go home   Plan   Treatment Interventions ADL retraining;Functional transfer training;UE strengthening/ROM;Endurance training;Cognitive reorientation;Patient/family training;Equipment evaluation/education;Compensatory technique education;Continued evaluation   Goal Expiration Date 01/05/25   OT Treatment Day 0   OT Frequency 2-3x/wk   Discharge Recommendation   Rehab Resource Intensity Level, OT III (Minimum Resource Intensity)   AM-PAC Daily Activity Inpatient   Lower Body Dressing 3   Bathing 3   Toileting 3   Upper Body Dressing 3   Grooming 4   Eating 4   Daily Activity Raw Score 20   Daily Activity Standardized Score (Calc for Raw Score >=11) 42.03   AM-[facility] Applied Cognition Inpatient   Following a Speech/Presentation 2   Understanding Ordinary Conversation 3   Taking Medications 2   Remembering Where Things Are Placed or Put  Away 2   Remembering List of 4-5 Errands 2   Taking Care of Complicated Tasks 2   Applied Cognition Raw Score 13   Applied Cognition Standardized Score 30.46   End of Consult   Education Provided Yes   Patient Position at End of Consult Bedside chair;Bed/Chair alarm activated;All needs within reach   Nurse Communication Nurse aware of consult       Pt will complete the following goals within 10 days:     Pt will complete grooming with JG.    Pt will complete UB bathing and dressing with supervision.    Pt will complete LB bathing and dressing with Valery & DME PRN.    Pt will complete toileting w/ supervision w/ G hygiene/thoroughness using DME PRN    Pt will improve functional mobility during ADL/IADL/leisure tasks to supervision using DME as needed w/ G balance/safety.    Pt will demonstrate G carryover of pt/caregiver education and training as appropriate w/ Mod I w/o cues w/ good tolerance.    Pt will perform functional transfers with JG in order to facilitate completion of  ADL routine.    Pt will increase standing tolerance to 10 minutes in order to complete ADL routine.    Pt will increase balance by 1 grade for increased independence with ADLs.     Pt will identify 3-5 fall risks to ensure safety upon discharge.      Kirstin Merida, OTR/L

## 2024-12-26 NOTE — SPEECH THERAPY NOTE
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)    Focal epilepsy (HCC)    Closed nondisplaced fracture of phalanx of right great toe      Past Medical History  Past Medical History:   Diagnosis Date    Depression     Diabetes mellitus (HCC)     patient denies    Dyslipidemia 03/26/2019    GERD (gastroesophageal reflux disease)     Hyperlipidemia     Hypertension     Prediabetes     Prediabetes 04/03/2019    Stroke (HCC)        Past Surgical History  Past Surgical History:   Procedure Laterality Date    ARTHROSCOPIC REPAIR ACL Left     BACK SURGERY      twice    CARPAL TUNNEL RELEASE Right     IR CEREBRAL ANGIOGRAPHY  05/04/2023    IR STROKE ALERT  03/19/2019    SHOULDER SURGERY Left     US GUIDED THYROID BIOPSY  11/21/2023       Summary   Pt presents w/ s/s suggestive of functional oropharyngeal swallow skills.     Complete labial seal for retrieval and containment of all materials, no anterior bolus loss present. Timely rotary mastication w/ intermittent vertical munching of regular textures. Complete bolus breakdown w/ adequate bolus transfer. Min diffuse oral residue noted, cleared w/ liquid wash. Suspected fairly prompt swallow initiation and fair hyolaryngeal elevation to palpation. No overt s/s of aspiration at this time.     Education provided to pt regarding strategies to optimize swallow safety including frequent/thorough oral care and to notify medical staff if s/s of aspiration arise. Pt verbalized understanding and agreement, denies questions or concerns at this time.     Pt w/ increased risk of aspiration 2/2 h/o dysphagia, current admission for AMS, and multiple  medical co-morbidities. SLP to s/o as no skilled intervention is required, no further IP ST necessary. Please re-consult if medically indicated.       Recommended Diet: regular diet and thin liquids   Recommended Form of Meds: as tolerated    Aspiration precautions and swallowing strategies: upright posture, slow rate of feeding, and alternating bites and sips  Other Recommendations: Continue frequent oral care        Current Medical Status  Pt is a 64 y.o. male with a PMH of epilepsy, hypertension, prediabetes, CVA, diabetes mellitus complicated by retinopathy, mood disorder presenting with complaints of altered mental status.  Patient is alert and oriented only to name and year, history mainly obtained from wife Faiza at bedside.  Patient was in his usual state of health up until this morning when he suddenly became agitated as he was about to take his pills.  Patient suddenly flung the pills, got up and was stomping his walker on the floor.  In the process he also mentioned wanting to ' die' and stop therapy.  He started to walk towards the door however his wife was able to redirect him to the chair and settle him down and take his pills.  A few minutes later patient suddenly became unresponsive.  His eyes were closed and he was not responding to voice or shaking.  She tried holding his extremities but noted he was very ' stiff'.  There was no other seizure-like activity noted such as urine or bowel incontinence, shaking or eyelid flickering.  She called the ambulance which took about 10 minutes and at that point patient was placed on the gurney and sat up, was responding to commands.  Per EMS examination, there was a concern for pinpoint pupils bilaterally.  His wife states patient had a rough night, woke up at 3 AM to sit in his recliner which she does often when he is upset about something.  He did have a mild cough over the last few days but no fevers, chills reported.  Last week patient sustained a fall  while working with a caregiver as he was getting to the bathroom.  He denied striking his head.  Patient has no missed medications recently.  He was seen by the neurologist on 12/10, was doing well except for elevated blood pressure for which patient was sent a prescription for hydrochlorothiazide.  Patient does not smoke or drink alcohol at this time.     Current Precautions:  Fall  Aspiration    Allergies:  No known food allergies    Past medical history:  Please see H&P for details    Special Studies:  12/24/24 CT head without contrast:  No acute intracranial abnormality. Chronic microangiopathic changes. Chronic left parieto-occipital encephalomalacia. Prominent cortical atrophy as before.     12/24/24 XR chest 1 view portable:  No acute cardiopulmonary disease.     No acute displaced fractures.    History of VFSS:  N/A     Social/Education/Vocational Hx:  Pt lives w/ spouse     Swallow Information   Current Diet: regular diet and thin liquids   Baseline Diet: regular diet and thin liquids per pt report       Baseline Assessment   Behavior/Cognition: alert  Speech/Language Status: able to participate in conversation and able to follow commands- pt reports speech deficits are baseline, no concerns for speech/lang deficits   Patient Positioning: upright in bed  Pain Status/Interventions/Response to Interventions: No report of or nonverbal indications of pain.       Oral Mechanism Exam  Facial: symmetrical  Labial: WFL  Lingual: WFL  Velum: symmetrical  Mandible: adequate ROM  Dentition: adequate  Vocal quality:clear/adequate   Volitional Cough: strong/productive   Respiratory Status: on RA         Consistencies Assessed and Performance   Consistencies Administered: thin liquids and hard solids    Oral Stage:   Complete labial seal for retrieval and containment of all materials, no anterior bolus loss present. Timely rotary mastication w/ intermittent vertical munching of regular textures. Complete bolus breakdown  w/ adequate bolus transfer. Min diffuse oral residue noted, cleared w/ liquid wash.     Pharyngeal Stage:   Suspected fairly prompt swallow initiation and fair hyolaryngeal elevation to palpation. No overt s/s of aspiration at this time.     Esophageal Concerns: none reported    Summary and Recommendations (see above)    Results Reviewed with: patient, RN, and MD     Treatment Recommended: N/A eval only, SLP to s/o as no skilled intervention is required, no further IP ST necessary. Please re-consult if medically indicated.

## 2024-12-26 NOTE — PROGRESS NOTES
Progress Note - Hospitalist   Name: Constanza Zhu 64 y.o. male I MRN: [id number]  Unit/Bed#: -01 I Date of Admission: 12/24/2024   Date of Service: 12/26/2024 I Hospital Day: 0    Assessment & Plan  Altered mental status  Patient presents with acute agitation followed by loss of consciousness, increased extremity tone  MRI 9/2024 shows:1. Punctate foci of restricted diffusion within the left occipital region, reflecting acute-subacute infarct.   2. Moderate to advanced generalized cortical atrophy. There is ventricular dilation which is greater than/out of proportion to the degree of cortical atrophy and can be seen in the setting of normal pressure hydrocephalus.   3. Left parieto-occipital encephalomalacia.   Patient received 1000mg IV keppra in ER  Patient appears to have dementia at baseline.  Has cognitive decline as well as needing help with his ADLs  He had a delirium episode the other day where he was aggravated and confused which ultimately brought him to the hospital    Neuro checks q4 hrs  Neuro on board and patient has prior recurrent strokes.  Possible postictal however no clear indication to adjust AED at this time.  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   Currently non-tender to palpation   Podiatry was ordered for right great toe fracture.  Likely chronic feared.  Weightbearing as tolerated.    VTE Pharmacologic Prophylaxis:   High Risk (Score >/= 5) - Pharmacological DVT Prophylaxis Ordered: enoxaparin (Lovenox). Sequential Compression Devices Ordered.    Mobility:   Basic Mobility Inpatient Raw Score: 19  JH-HLM Goal: 6: Walk 10 steps or more  JH-HLM Achieved: 6: Walk 10 steps or more  JH-HLM Goal NOT achieved. Continue with multidisciplinary rounding and encourage appropriate mobility to improve upon JH-HLM goals.    Patient Centered Rounds: I performed bedside rounds with nursing staff today.   Discussions with Specialists or Other Care Team Provider: podiatry and neurology    Education and Discussions with Family / Patient: Updated  (wife) at bedside.    Current Length of Stay: 0 day(s)  Current Patient Status: Observation   Certification Statement: The patient will continue to require additional inpatient hospital stay due to need for PT/OT assessment  Discharge Plan: Anticipate discharge in 24-48 hrs to home with home services.    Code Status: Level 1 - Full Code    Subjective     Patient is well.  No complaints  Objective :  Temp:  [97.7 °F (36.5 °C)-97.9 °F (36.6 °C)] 97.9 °F (36.6 °C)  HR:  [73-87] 73  BP: (131-171)/(67-99) 131/76  Resp:  [18] 18  SpO2:  [88 %-99 %] 88 %  O2 Device: None (Room air)    Body mass index is 33.31 kg/m².     Input and Output Summary (last 24 hours):     Intake/Output Summary (Last 24 hours) at 12/26/2024 0918  Last data filed at 12/26/2024 0804  Gross per 24 hour   Intake 60 ml   Output 650 ml   Net -590 ml       Physical Exam  Vitals and nursing note reviewed.    Constitutional:       General: He is not in acute distress.     Appearance: He is well-developed.   HENT:      Head: Normocephalic and atraumatic.   Cardiovascular:      Rate and Rhythm: Normal rate and regular rhythm.      Heart sounds: No murmur heard.  Pulmonary:      Effort: Pulmonary effort is normal. No respiratory distress.      Breath sounds: Normal breath sounds.   Abdominal:      Palpations: Abdomen is soft.      Tenderness: There is no abdominal tenderness.   Musculoskeletal:         General: Signs of injury (right ankle bruising and small lacerations) present. No swelling.   Skin:     General: Skin is warm and dry.   Neurological:      Mental Status: He is alert.   Psychiatric:         Mood and Affect: Mood normal.           Lines/Drains:              Lab Results: I have reviewed the following results:   Results from last 7 days   Lab Units 12/26/24  0456 12/24/24  0931 12/24/24  0929   WBC Thousand/uL 9.04  --  8.59   HEMOGLOBIN g/dL 14.3  --  13.8   I STAT HEMOGLOBIN   --    < >  --    HEMATOCRIT % 43.8  --  42.5   HEMATOCRIT, ISTAT   --    < >  --    PLATELETS Thousands/uL 247  --  217   SEGS PCT %  --   --  67   LYMPHO PCT %  --   --  22   MONO PCT %  --   --  8   EOS PCT %  --   --  2    < > = values in this interval not displayed.     Results from last 7 days   Lab Units 12/26/24  0456 12/24/24  0931 12/24/24  0929   SODIUM mmol/L 137  --  139   POTASSIUM mmol/L 3.8  --  4.3   CHLORIDE mmol/L 104  --  108   CO2 mmol/L 25  --  28   CO2, I-STAT   --    < >  --    BUN mg/dL 25  --  26*   CREATININE mg/dL 1.09  --  1.11   ANION GAP mmol/L 8  --  3*   CALCIUM mg/dL 9.0  --  8.5   ALBUMIN g/dL  --   --  3.8   TOTAL BILIRUBIN mg/dL  --   --  0.48   ALK PHOS U/L  --   --  105*   ALT U/L  --   --  15   AST U/L  --   --  14   GLUCOSE RANDOM mg/dL 103  --  116    < > = values in this interval not displayed.         Results from last 7 days   Lab Units 12/26/24  0718 12/25/24 2054 12/25/24  7399  12/25/24  1218 12/25/24  0510 12/25/24  0005 12/24/24  1723 12/24/24  0915   POC GLUCOSE mg/dl 95 111 113 87 85 78 83 127     Results from last 7 days   Lab Units 12/24/24  0929   HEMOGLOBIN A1C % 5.7*     Results from last 7 days   Lab Units 12/24/24  0929   LACTIC ACID mmol/L 1.1       Recent Cultures (last 7 days):               Last 24 Hours Medication List:     Current Facility-Administered Medications:     acetaminophen (TYLENOL) tablet 650 mg, Q6H PRN    amantadine (SYMMETREL) capsule 100 mg, BID    aspirin chewable tablet 81 mg, Daily    atorvastatin (LIPITOR) tablet 40 mg, Daily With Breakfast    baclofen tablet 5 mg, BID PRN    donepezil (ARICEPT) tablet 10 mg, Daily    enoxaparin (LOVENOX) subcutaneous injection 40 mg, Daily    hydroCHLOROthiazide tablet 25 mg, Daily    insulin lispro (HumALOG/ADMELOG) 100 units/mL subcutaneous injection 1-5 Units, 4x Daily (AC & HS) **AND** Fingerstick Glucose (POCT), 4x Daily AC and at bedtime    levETIRAcetam (KEPPRA) tablet 1,500 mg, Q12H JOSE    melatonin tablet 5 mg, HS    metoprolol succinate (TOPROL-XL) 24 hr tablet 50 mg, Daily    mirtazapine (REMERON) tablet 7.5 mg, HS    pantoprazole (PROTONIX) EC tablet 20 mg, Daily    tamsulosin (FLOMAX) capsule 0.4 mg, Daily With Dinner    ticagrelor (BRILINTA) tablet 90 mg, Q12H JOSE    Administrative Statements   Today, Patient Was Seen By: Kody Ma MD    I have spent a total time of 60 minutes in caring for this patient on the day of the visit/encounter including Diagnostic results, Prognosis, Risks and benefits of tx options, Instructions for management, Patient and family education, Importance of tx compliance, Risk factor reductions, Impressions, Counseling / Coordination of care, Documenting in the medical record, Reviewing / ordering tests, medicine, procedures  , Obtaining or reviewing history  , and Communicating with other healthcare professionals .    **Please Note: This note may have been  constructed using a voice recognition system.**

## 2024-12-27 VITALS
WEIGHT: 206.35 LBS | RESPIRATION RATE: 18 BRPM | OXYGEN SATURATION: 95 % | HEIGHT: 66 IN | DIASTOLIC BLOOD PRESSURE: 74 MMHG | SYSTOLIC BLOOD PRESSURE: 128 MMHG | BODY MASS INDEX: 33.16 KG/M2 | TEMPERATURE: 97.7 F | HEART RATE: 75 BPM

## 2024-12-27 LAB
ANION GAP SERPL CALCULATED.3IONS-SCNC: 8 MMOL/L (ref 4–13)
ATRIAL RATE: 68 BPM
BUN SERPL-MCNC: 25 MG/DL (ref 5–25)
CALCIUM SERPL-MCNC: 9 MG/DL (ref 8.4–10.2)
CHLORIDE SERPL-SCNC: 104 MMOL/L (ref 96–108)
CO2 SERPL-SCNC: 25 MMOL/L (ref 21–32)
CREAT SERPL-MCNC: 1.1 MG/DL (ref 0.6–1.3)
ERYTHROCYTE [DISTWIDTH] IN BLOOD BY AUTOMATED COUNT: 13.3 % (ref 11.6–15.1)
GFR SERPL CREATININE-BSD FRML MDRD: 70 ML/MIN/1.73SQ M
GLUCOSE SERPL-MCNC: 107 MG/DL (ref 65–140)
GLUCOSE SERPL-MCNC: 114 MG/DL (ref 65–140)
GLUCOSE SERPL-MCNC: 117 MG/DL (ref 65–140)
HCT VFR BLD AUTO: 43.8 % (ref 36.5–49.3)
HGB BLD-MCNC: 14.1 G/DL (ref 12–17)
MCH RBC QN AUTO: 29.1 PG (ref 26.8–34.3)
MCHC RBC AUTO-ENTMCNC: 32.2 G/DL (ref 31.4–37.4)
MCV RBC AUTO: 90 FL (ref 82–98)
P AXIS: 46 DEGREES
PLATELET # BLD AUTO: 254 THOUSANDS/UL (ref 149–390)
PMV BLD AUTO: 10.1 FL (ref 8.9–12.7)
POTASSIUM SERPL-SCNC: 3.6 MMOL/L (ref 3.5–5.3)
PR INTERVAL: 172 MS
QRS AXIS: 32 DEGREES
QRSD INTERVAL: 98 MS
QT INTERVAL: 382 MS
QTC INTERVAL: 406 MS
RBC # BLD AUTO: 4.85 MILLION/UL (ref 3.88–5.62)
SODIUM SERPL-SCNC: 137 MMOL/L (ref 135–147)
T WAVE AXIS: 36 DEGREES
VENTRICULAR RATE: 68 BPM
WBC # BLD AUTO: 10.09 THOUSAND/UL (ref 4.31–10.16)

## 2024-12-27 PROCEDURE — 93010 ELECTROCARDIOGRAM REPORT: CPT | Performed by: INTERNAL MEDICINE

## 2024-12-27 PROCEDURE — 82948 REAGENT STRIP/BLOOD GLUCOSE: CPT

## 2024-12-27 PROCEDURE — 99239 HOSP IP/OBS DSCHRG MGMT >30: CPT

## 2024-12-27 PROCEDURE — 80048 BASIC METABOLIC PNL TOTAL CA: CPT

## 2024-12-27 PROCEDURE — 85027 COMPLETE CBC AUTOMATED: CPT

## 2024-12-27 RX ORDER — BACLOFEN 5 MG/1
5 TABLET ORAL 2 TIMES DAILY PRN
Start: 2024-12-27

## 2024-12-27 RX ORDER — QUETIAPINE FUMARATE 25 MG/1
25 TABLET, FILM COATED ORAL
Qty: 30 TABLET | Refills: 0 | Status: SHIPPED | OUTPATIENT
Start: 2024-12-27

## 2024-12-27 RX ORDER — HYDROCHLOROTHIAZIDE 25 MG/1
25 TABLET ORAL DAILY
Start: 2024-12-28

## 2024-12-27 RX ORDER — LEVETIRACETAM 750 MG/1
1500 TABLET ORAL EVERY 12 HOURS SCHEDULED
Start: 2024-12-27

## 2024-12-27 RX ADMIN — METOPROLOL SUCCINATE 50 MG: 50 TABLET, EXTENDED RELEASE ORAL at 09:32

## 2024-12-27 RX ADMIN — DONEPEZIL HYDROCHLORIDE 10 MG: 5 TABLET ORAL at 09:31

## 2024-12-27 RX ADMIN — ENOXAPARIN SODIUM 40 MG: 40 INJECTION SUBCUTANEOUS at 09:33

## 2024-12-27 RX ADMIN — TICAGRELOR 90 MG: 90 TABLET ORAL at 09:33

## 2024-12-27 RX ADMIN — ASPIRIN 81 MG CHEWABLE TABLET 81 MG: 81 TABLET CHEWABLE at 09:31

## 2024-12-27 RX ADMIN — ATORVASTATIN CALCIUM 40 MG: 40 TABLET, FILM COATED ORAL at 09:31

## 2024-12-27 RX ADMIN — PANTOPRAZOLE SODIUM 20 MG: 20 TABLET, DELAYED RELEASE ORAL at 09:32

## 2024-12-27 RX ADMIN — LEVETIRACETAM 1500 MG: 500 TABLET, FILM COATED ORAL at 09:32

## 2024-12-27 RX ADMIN — AMANTADINE HYDROCHLORIDE 100 MG: 100 CAPSULE ORAL at 09:31

## 2024-12-27 RX ADMIN — HYDROCHLOROTHIAZIDE 25 MG: 25 TABLET ORAL at 09:32

## 2024-12-27 NOTE — DISCHARGE SUMMARY
Discharge Summary - Hospitalist   Name: Constanza Zhu 64 y.o. male I MRN: [id number]  Unit/Bed#: -01 I Date of Admission: 12/24/2024   Date of Service: 12/27/2024 I Hospital Day: 0     Assessment & Plan  Altered mental status  Patient presents with acute agitation followed by loss of consciousness, increased extremity tone  MRI 9/2024 shows:1. Punctate foci of restricted diffusion within the left occipital region, reflecting acute-subacute infarct.   2. Moderate to advanced generalized cortical atrophy. There is ventricular dilation which is greater than/out of proportion to the degree of cortical atrophy and can be seen in the setting of normal pressure hydrocephalus.   3. Left parieto-occipital encephalomalacia.   Patient received 1000mg IV keppra in ER  Patient appears to have dementia at baseline.  Has cognitive decline as well as needing help with his ADLs  He had a delirium episode the other day where he was aggravated and confused which ultimately brought him to the hospital  Continue home Keppra 1500 mg BID  Clarified with spouse, patient had a delirium like episode with rage followed by an episode lasting around 20 minutes where patient was very rigid and loc.  EMS had a difficult time fitting a blood pressure cuff because of this rigidity  No fever noted  Only medication change recently was Keppra was increased to 1500 mg twice daily  On literature review it looks like Keppra has a rare side effect of dystonia?    On medication review patient looks like she was on mirtazapine as well as trazodone.  Possible serotonin syndrome like episode?  Did advise to discontinue trazodone  Close follow-up with the epilepsy team should be done.  Discussed this with the spouse and she agrees  Patient also has clinical diagnosis of dementia due to cognitive decline and help with ADLs.  Would benefit from a geriatrics consult.  Medically clear for discharge    Primary hypertension  Bp elevated to 180s during  admission  Per neuro, cardiology, vascular notes, bp<130 goal outpatient  Patients spouse was very concerned. She was previously told to keep BP greater than 140 due to strokes. I advised to followup with outpatient doctors and confirm. Based on latest notes bp goal is 130  Will add HCTZ  Continue with metoprolol 50mg daily   History of stroke  CT brain negative  Continue with aspirin, brilinta, lipitor  Type 2 diabetes mellitus with other diabetic ophthalmic complication (HCC)  Lab Results   Component Value Date    HGBA1C 5.7 (H) 12/24/2024       Recent Labs     12/26/24  1130 12/26/24  1630 12/26/24  2036 12/27/24  0707   POCGLU 112 116 149* 114       Blood Sugar Average: Last 72 hrs:  (P) [id number]  Continue home meds  GERD (gastroesophageal reflux disease)  Continue with PPI   Focal epilepsy (HCC)  Plan noted above  Closed nondisplaced fracture of phalanx of right great toe  Likely occurred in agitated state.   Currently non-tender to palpation   Podiatry was ordered for right great toe fracture.  Likely chronic feared.  Weightbearing as tolerated.     Medical Problems       Resolved Problems  Date Reviewed: 7/19/2024   None       Discharging Physician / Practitioner: Kody Ma MD  PCP: ERIC Calixto  Admission Date:   Admission Orders (From admission, onward)       Ordered        12/27/24 0809  INPATIENT ADMISSION  Once            12/24/24 1301  Place in Observation  Once                          Discharge Date: 12/27/24    Consultations During Hospital Stay:  none    Procedures Performed:   none    Significant Findings / Test Results:   XR foot 3+ views RIGHT  Result Date: 12/24/2024  Impression: Probable nondisplaced fracture of the first phalanx. The study was marked in EPIC for immediate notification. Computerized Assisted Algorithm (CAA) may have been used to analyze all applicable images. Workstation performed: [id number]     CT head without contrast  Result Date:  "12/24/2024  Impression: No acute intracranial abnormality.  Chronic microangiopathic changes. Chronic left parieto-occipital encephalomalacia. Prominent cortical atrophy as before. Workstation performed: [id number]     XR chest 1 view portable  Result Date: 12/24/2024  Impression: No acute cardiopulmonary disease. No acute displaced fractures. Workstation performed: [id number]       No Chest XR results available for this patient.       Incidental Findings:   none       Test Results Pending at Discharge (will require follow up):   none     Outpatient Tests Requested:  none    Complications:  none    Reason for Admission: Altered mental status    Hospital Course:   Constanza Zhu is a 64 y.o. male patient who originally presented to the hospital on 12/24/2024 due to altered mental status.  No recurrence was found.  Labs all normal.  Cleared for discharge.  Plan noted above        Please see above list of diagnoses and related plan for additional information.     Condition at Discharge: good    Discharge Day Visit / Exam:   Subjective:  Patient does not report and headaches, shortness of breath, chest pain, abdominal pain, nausea vomiting, lower leg edema. Also reports good sleep    Vitals: Blood Pressure: 128/74 (12/27/24 0709)  Pulse: 75 (12/27/24 0709)  Temperature: 97.7 °F (36.5 °C) (12/27/24 0709)  Temp Source: Temporal (12/27/24 0709)  Respirations: 18 (12/27/24 0900)  Height: 5' 6\" (167.6 cm) (12/24/24 1328)  Weight - Scale: 93.6 kg (206 lb 5.6 oz) (12/24/24 1328)  SpO2: 95 % (12/27/24 0709)  Physical Exam  Vitals and nursing note reviewed.   Constitutional:       Appearance: He is well-developed and normal weight.   HENT:      Head: Normocephalic and atraumatic.      Nose: Nose normal.      Mouth/Throat:      Mouth: Mucous membranes are moist.   Eyes:      Conjunctiva/sclera: Conjunctivae normal.   Cardiovascular:      Rate and Rhythm: Normal rate and regular rhythm.      Heart sounds: Normal heart sounds. No " murmur heard.  Pulmonary:      Effort: Pulmonary effort is normal. No respiratory distress.      Breath sounds: Normal breath sounds.   Abdominal:      General: Abdomen is flat.      Palpations: Abdomen is soft.      Tenderness: There is no abdominal tenderness.   Musculoskeletal:         General: No swelling.      Cervical back: Neck supple.      Right lower leg: No edema.      Left lower leg: No edema.   Skin:     General: Skin is warm and dry.      Capillary Refill: Capillary refill takes less than 2 seconds.   Neurological:      General: No focal deficit present.      Mental Status: He is alert. Mental status is at baseline. He is disoriented.   Psychiatric:         Mood and Affect: Mood normal.        Discussion with Family: Updated  () via phone.    Discharge instructions/Information to patient and family:   See after visit summary for information provided to patient and family.      Provisions for Follow-Up Care:  See after visit summary for information related to follow-up care and any pertinent home health orders.      Mobility at time of Discharge:   Basic Mobility Inpatient Raw Score: 16  JH-HLM Goal: 5: Stand one or more mins  JH-HLM Achieved: 6: Walk 10 steps or more  HLM Goal achieved. Continue to encourage appropriate mobility.     Disposition:   Home    Planned Readmission:none    Discharge Medications:  See after visit summary for reconciled discharge medications provided to patient and/or family.      Administrative Statements   Discharge Statement:  I have spent a total time of 40 minutes in caring for this patient on the day of the visit/encounter. >30 minutes of time was spent on: Diagnostic results, Prognosis, Risks and benefits of tx options, Instructions for management, Patient and family education, Importance of tx compliance, Risk factor reductions, Impressions, Counseling / Coordination of care, Documenting in the medical record, Reviewing / ordering tests, medicine,  procedures  , and Communicating with other healthcare professionals .    **Please Note: This note may have been constructed using a voice recognition system**

## 2024-12-27 NOTE — CASE MANAGEMENT
Case Management Discharge Planning Note    Patient name Constanza Zhu  Location /-01 MRN [id number]  : 1960 Date 2024       Current Admission Date: 2024  Current Admission Diagnosis:Altered mental status   Patient Active Problem List    Diagnosis Date Noted Date Diagnosed    Closed nondisplaced fracture of phalanx of right great toe 2024     Cerebral ventriculomegaly 10/10/2024     Iliac artery stenosis, left ([facility]) 2024     Carotid stenosis, bilateral 2024     PVC (premature ventricular contraction) 2024     Acute stroke due to ischemia ([facility]) 2024     Internal carotid artery stenosis, left 2024     Acute CVA (cerebrovascular accident) ([facility]) 2024     COVID 2024     Internal carotid artery stent present 2024     Vision problem 2024     Dry eyes 2024     Focal epilepsy ([facility]) 2023     Claudication of both lower extremities ([facility]) 2023     Type 2 diabetes mellitus with other diabetic ophthalmic complication ([facility]) 2023     Aphasia 2023    Atherosclerosis of native arteries of extremities with intermittent claudication, bilateral legs ([facility]) 2023    Benign prostatic hyperplasia with lower urinary tract symptoms 2023    Possible NPH (normal pressure hydrocephalus) ([facility]) 2023     Muscle spasms of both lower extremities 2023     Multiple thyroid nodules 2023     Abnormal laboratory test 05/15/2023     History of tobacco use 2023     Chronic ischemic left MCA stroke 2023     Infrarenal abdominal aortic aneurysm (AAA) without rupture ([facility]) 2023     Tachycardia 2023     Prediabetes 2023     SIRS (systemic inflammatory response syndrome) ([facility]) 2023     Recurrent strokes ([facility]) 2023     Hyponatremia 2023     Middle cerebral artery stenosis, right 2023     Altered mental status 2023      Chronic low back pain 04/16/2023     Hypertensive encephalopathy, transient 01/12/2023     Snoring 08/10/2020     Memory deficits 08/10/2020     Status post placement of implantable loop recorder 04/06/2019     History of prediabetes 04/03/2019     Anxiety and depression 03/29/2019     Insomnia 03/29/2019     Fall 03/28/2019     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) 03/28/2019     Urinary retention 03/27/2019     At risk for venous thromboembolism (VTE) 03/26/2019     Hypertriglyceridemia 03/26/2019     Nicotine dependence 03/26/2019     Left carotid stenosis 03/26/2019     Presbyopia 03/26/2019     History of stroke 03/19/2019     Headache 03/19/2019     Primary hypertension 03/19/2019     GERD (gastroesophageal reflux disease) 03/19/2019       LOS (days): 0  Geometric Mean LOS (GMLOS) (days):   Days to GMLOS:     OBJECTIVE:            Current admission status: Inpatient   Preferred Pharmacy:   [facility] [address] [place] [id number]  Phone: [phone] Fax: [phone]    [facility] - [place] [id number] [facility]  [address] [facility] [place] [address]  Phone: [phone] Fax: [phone]    Primary Care Provider: ERIC Calixto    Primary Insurance: BLUE CROSS  Secondary Insurance:     DISCHARGE DETAILS:     [facility] is unable to accept pt. CM reopened referral to find a new provider.

## 2024-12-27 NOTE — UTILIZATION REVIEW
Initial Clinical Review    OBSERVATION 12/24 CHANGED TO INPATIENT ON 12/27 @ 0809 -       Admission: Date/Time/Statement:   Admission Orders (From admission, onward)       Ordered        12/27/24 0809  INPATIENT ADMISSION  Once            12/24/24 1301  Place in Observation  Once                          Orders Placed This Encounter   Procedures    INPATIENT ADMISSION     Standing Status:   Standing     Number of Occurrences:   1     Level of Care:   Med Surg [16]     Estimated length of stay:   More than 2 Midnights     Certification:   I certify that inpatient services are medically necessary for this patient for a duration of greater than two midnights. See H&P and MD Progress Notes for additional information about the patient's course of treatment.     ED Arrival Information       Expected   -    Arrival   12/24/2024 09:12    Acuity   Emergent              Means of arrival   Ambulance    Escorted by   [facility]    Service   Hospitalist    Admission type   Emergency              Arrival complaint   AMS             Chief Complaint   Patient presents with    Altered Mental Status     Pt had outburst at home around 2am. Per EMS wife states was unresponsive in chair at 0815. Pt with hx of stroke. EMS states pt had episode similar in past when bp under 140's     Initial Presentation: 64 y.o. male presents to the ED via EMS from home with c/o sudden episode of agitation, banging walker on  the floor, states he wanted to die and stop therapy.  Wife redirected, pt took pills and he became unresponsive, extremities were still, no seizure like activity or bowel/bladder incontinence.  When EMS arrived approx 10 min later pt was sitting up and responsive. Per EMS pupils pinpoint bilat.  Wife not pt had rough night, sitting in recliner, mild cough, chills, no fever x several days, had a fall 1 wk prior, no head strike, last neuro exam 12/10, doing well except for HTN.  PMH: epilepsy, HTN,  CVA, DM complicated by  retinopathy, mood disorder.   In the ED VS stable, no c/o pain.  Labs - abnormal UA, elevated BUN, alk phos.  ECG - SR w/ PVCs.  Imaging - prob nondisplaced fx 1st phalanx on R, no other acute disease.  Treated with IV fluids, IV Keppra.  On exam ill-appearing, lethargic, disoriented, Laceration base R dorsal hallux with bruising.  Admitted to Observation Status  with Altered Mental Status, closed fx R great toe phalanx - PLAN: neuro checks q 4 hr, continue AED, hold sedating meds, UDS, UA, RP2, neuro consult, seizure and fall prec, PRN Ativan for seizures, NPO, therapy evals, SSI cover, continue home Metoprolol, PPI, Podiatry consult.     Anticipated Length of Stay/Certification Statement: Patient will be admitted on an inpatient basis with an anticipated length of stay of greater than 2 midnights secondary to above.     12/24 Neuro Consult - h/o previous stroke, epilepsy on Keppra, now with AMS and mood change, improving. Notes med compliance. Unclear if seizure/post-ictal encephalopathy or not.  No need for MRI, EEG since improving.  Continue Keppar.     Date: 12/25  OBS day 2:   Altered Mental Status, closed fx R great toe phalanx - feels back to baseline mentation today. Continue same Keppra dosing. On exam has R ankle bruising, small lacerations.  Was intermittently HTN today. Using PRN PO Tylenol.     12/25 Podiatry Consult - possible nondisplaced oblique fracture at the proximal lateral aspect of the distal phalanx of the R great toe - appears nonacute, no c/o pain, can bear weight. Mild bruising.  No acute care needed.     Date: 12/26  OBS day 3:   Altered Mental Status, closed fx R great toe phalanx - pt appears to have dementia at baseline. Continues on AED geriatric consult, Start HS Seroquel, NPO for SLP dysphagia screen. No deficits on exam. Has rare intermittent hypoxia - not on oxygen.  PT eval done - mobility score 17/16.  May need C services on d/c.      DATE: 12/27 CHANGED TO INPATIENT STATUS  TODAY:   AMS, Closed fx R great toe phalanx - VS stable, CM working on [facility] services for d/c home. Pt is written for d/c home today on Keppra.     ED Treatment-Medication Administration from 12/24/2024 0912 to 12/24/2024 1319         Date/Time Order Dose Route Action     12/24/2024 0931 sodium chloride 0.9 % bolus 1,000 mL 1,000 mL Intravenous New Bag     12/24/2024 1030 levETIRAcetam (KEPPRA) injection 1,000 mg 1,000 mg Intravenous Given            Scheduled Medications:  amantadine, 100 mg, Oral, BID  aspirin, 81 mg, Oral, Daily  atorvastatin, 40 mg, Oral, Daily With Breakfast  donepezil, 10 mg, Oral, Daily  enoxaparin, 40 mg, Subcutaneous, Daily  hydroCHLOROthiazide, 25 mg, Oral, Daily  insulin lispro, 1-5 Units, Subcutaneous, 4x Daily (AC & HS)  levETIRAcetam, 1,500 mg, Oral, Q12H JOSE  melatonin, 5 mg, Oral, HS  metoprolol succinate, 50 mg, Oral, Daily  mirtazapine, 7.5 mg, Oral, HS  pantoprazole, 20 mg, Oral, Daily  QUEtiapine, 25 mg, Oral, HS  tamsulosin, 0.4 mg, Oral, Daily With Dinner  ticagrelor, 90 mg, Oral, Q12H JOSE      Continuous IV Infusions:     PRN Meds:  acetaminophen, 650 mg, Oral, Q6H PRN - x 1 12/25  baclofen, 5 mg, Oral, BID PRN      ED Triage Vitals   Temperature Pulse Respirations Blood Pressure SpO2 Pain Score   12/24/24 0934 12/24/24 0916 12/24/24 0916 12/24/24 0916 12/24/24 0916 12/24/24 0916   98.1 °F (36.7 °C) 68 18 165/67 98 % No Pain     Weight (last 2 days)       None            Vital Signs (last 3 days)       Date/Time Temp Pulse Resp BP MAP (mmHg) SpO2 O2 Device Patient Position - Orthostatic VS [place] Coma Scale Score Pain    12/27/24 07:09:19 97.7 °F (36.5 °C) 75 -- 128/74 92 95 % -- Lying -- --    12/27/24 0100 -- -- -- -- -- -- -- -- 15 No Pain    12/26/24 2100 -- -- -- -- -- -- -- -- 15 No Pain    12/26/24 20:39:13 97.9 °F (36.6 °C) 81 -- 131/74 93 94 % None (Room air) Lying -- --    12/26/24 15:05:13 97.7 °F (36.5 °C) 72 -- 133/68 90 93 % -- Lying -- --    12/26/24 1430 -- --  -- -- -- 94 % None (Room air) -- 15 No Pain    12/26/24 1335 -- -- -- -- -- -- -- -- -- No Pain    12/26/24 1334 -- -- -- -- -- -- -- -- -- No Pain    12/26/24 07:19:41 97.9 °F (36.6 °C) 73 -- 131/76 94 88 % -- Lying -- --    12/25/24 21:56:52 97.9 °F (36.6 °C) 80 -- 137/69 92 88 % -- -- -- --    12/25/24 21:09:46 97.7 °F (36.5 °C) 76 18 144/67 93 95 % None (Room air) -- -- --    12/25/24 2014 -- -- -- -- -- -- -- -- -- 5    12/25/24 1921 -- -- -- -- -- -- -- -- 15 3    12/25/24 17:31:23 -- 83 -- 149/78 102 99 % -- -- -- --    12/25/24 1550 -- -- -- -- -- 97 % None (Room air) -- 14 No Pain    12/25/24 15:31:52 97.7 °F (36.5 °C) 85 -- 171/99 123 99 % -- -- -- --    12/25/24 15:31:32 -- 87 -- 171/99 123 99 % -- -- -- --    12/25/24 13:53:27 97.7 °F (36.5 °C) 76 -- 168/85 113 98 % -- -- -- --    12/25/24 0845 -- -- -- -- -- -- None (Room air) -- 14 No Pain    12/25/24 07:11:47 96.8 °F (36 °C) 77 20 173/80 111 96 % None (Room air) Lying -- --    12/24/24 21:34:30 97.6 °F (36.4 °C) 73 16 148/75 99 94 % -- -- -- --    12/24/24 1922 -- -- -- -- -- -- -- -- 14 No Pain    12/24/24 1337 -- -- -- -- -- 98 % None (Room air) -- 15 No Pain    12/24/24 13:28:24 98.3 °F (36.8 °C) 70 20 172/81 111 99 % -- Sitting -- --    12/24/24 1324 -- -- -- -- -- -- -- -- -- No Pain    12/24/24 1230 -- 68 17 161/78 112 99 % None (Room air) Sitting -- --    12/24/24 1145 -- 63 16 159/72 104 99 % None (Room air) Sitting -- --    12/24/24 1130 -- 58 15 164/66 95 99 % -- -- -- --    12/24/24 1115 -- 61 14 144/65 93 100 % -- -- -- --    12/24/24 1030 -- 61 18 139/65 93 99 % None (Room air) Sitting -- --    12/24/24 1015 -- 62 15 131/57 85 99 % -- -- -- --    12/24/24 0945 -- 63 16 127/58 84 99 % None (Room air) Lying -- --    12/24/24 0935 -- -- -- -- -- -- None (Room air) -- -- --    12/24/24 0934 98.1 °F (36.7 °C) -- -- -- -- -- -- -- -- --    12/24/24 0921 -- -- -- -- -- -- -- -- 13 --    12/24/24 0916 -- 68 18 165/67 -- 98 % None (Room air) Lying  -- No Pain              Pertinent Labs/Diagnostic Test Results:   Radiology:  CT head without contrast   Final Interpretation by Yaya Hernández MD (12/24 1026)      No acute intracranial abnormality.  Chronic microangiopathic changes. Chronic left parieto-occipital encephalomalacia. Prominent cortical atrophy as before.                  Workstation performed: [id number]         XR chest 1 view portable   Final Interpretation by Mae Franco MD (12/24 0946)      No acute cardiopulmonary disease.      No acute displaced fractures.      Workstation performed: [id number]         XR foot 3+ views RIGHT   ED Interpretation by Myriam Leger MD (12/24 1040)   No acute fractures or dislocations      Final Interpretation by Gallito Hardy MD (12/24 1050)      Probable nondisplaced fracture of the first phalanx.      The study was marked in EPIC for immediate notification.         Computerized Assisted Algorithm (CAA) may have been used to analyze all applicable images.         Workstation performed: [id number]           Cardiology:  ECG 12 lead    by Interface, Ris Results In (12/24 0924)        GI:  No orders to display       Results from last 7 days   Lab Units 12/24/24  1600   SARS-COV-2  Negative     Results from last 7 days   Lab Units 12/27/24  0244 12/26/24  0456 12/24/24  0931 12/24/24  0929   WBC Thousand/uL 10.09 9.04  --  8.59   HEMOGLOBIN g/dL 14.1 14.3  --  13.8   I STAT HEMOGLOBIN g/dl  --   --  13.9  --    HEMATOCRIT % 43.8 43.8  --  42.5   HEMATOCRIT, ISTAT %  --   --  41  --    PLATELETS Thousands/uL 254 247  --  217   TOTAL NEUT ABS Thousands/µL  --   --   --  5.69         Results from last 7 days   Lab Units 12/27/24  0244 12/26/24  0456 12/24/24  0931 12/24/24  0929   SODIUM mmol/L 137 137  --  139   POTASSIUM mmol/L 3.6 3.8  --  4.3   CHLORIDE mmol/L 104 104  --  108   CO2 mmol/L 25 25  --  28   CO2, I-STAT mmol/L  --   --  27  --    ANION GAP mmol/L 8 8  --  3*   BUN mg/dL 25 25  --   26*   CREATININE mg/dL 1.10 1.09  --  1.11   EGFR ml/min/1.73sq m 70 70  --  69   CALCIUM mg/dL 9.0 9.0  --  8.5   CALCIUM, IONIZED, ISTAT mmol/L  --   --  1.23  --    MAGNESIUM mg/dL  --  2.2  --   --    PHOSPHORUS mg/dL  --  4.3*  --   --      Results from last 7 days   Lab Units 12/24/24  0929   AST U/L 14   ALT U/L 15   ALK PHOS U/L 105*   TOTAL PROTEIN g/dL 6.0*   ALBUMIN g/dL 3.8   TOTAL BILIRUBIN mg/dL 0.48     Results from last 7 days   Lab Units 12/27/24  0707 12/26/24  2036 12/26/24  1630 12/26/24  1130 12/26/24  0718 12/25/24  2054 12/25/24  1650 12/25/24  1218 12/25/24  0510 12/25/24  0005 12/24/24  1723 12/24/24  0915   POC GLUCOSE mg/dl 114 149* 116 112 95 111 113 87 85 78 83 127     Results from last 7 days   Lab Units 12/27/24  0244 12/26/24  0456 12/24/24  0929   GLUCOSE RANDOM mg/dL 107 103 116         Results from last 7 days   Lab Units 12/24/24  0929   HEMOGLOBIN A1C % 5.7*   EAG mg/dl 117       Results from last 7 days   Lab Units 12/24/24  0931   PH, GILDA I-STAT  7.326   PCO2, GILDA ISTAT mm HG 48.5   PO2, GILDA ISTAT mm HG 22.0*   HCO3, GILDA ISTAT mmol/L 25.4   I STAT BASE EXC mmol/L -1   I STAT O2 SAT % 33*         Results from last 7 days   Lab Units 12/24/24  1141 12/24/24  0929   HS TNI 0HR ng/L  --  8   HS TNI 2HR ng/L 8  --    HSTNI D2 ng/L 0  --              Results from last 7 days   Lab Units 12/24/24  0929   TSH 3RD GENERATON uIU/mL 1.876  1.954         Results from last 7 days   Lab Units 12/24/24  0929   LACTIC ACID mmol/L 1.1       Results from last 7 days   Lab Units 12/24/24  1612   CLARITY UA  Clear   COLOR UA  Yellow   SPEC GRAV UA  1.025   PH UA  6.0   GLUCOSE UA mg/dl 30 (3/100%)*   KETONES UA mg/dl Negative   BLOOD UA  Negative   PROTEIN UA mg/dl Trace*   NITRITE UA  Negative   BILIRUBIN UA  Negative   UROBILINOGEN UA (BE) mg/dl <2.0   LEUKOCYTES UA  Negative   WBC UA /hpf 1-2   RBC UA /hpf None Seen   BACTERIA UA /hpf Occasional   EPITHELIAL CELLS WET PREP /hpf Occasional    MUCUS THREADS  Occasional*     Results from last 7 days   Lab Units 12/24/24  1600   INFLUENZA A PCR  Negative   INFLUENZA B PCR  Negative   RSV PCR  Negative         Results from last 7 days   Lab Units 12/24/24  1612   AMPH/METH  Negative   BARBITURATE UR  Negative   BENZODIAZEPINE UR  Negative   COCAINE UR  Negative   METHADONE URINE  Negative   OPIATE UR  Negative   PCP UR  Negative   THC UR  Negative     Results from last 7 days   Lab Units 12/24/24  0929   ETHANOL LVL mg/dL <10   ACETAMINOPHEN LVL ug/mL 9*   SALICYLATE LVL mg/dL <5     Past Medical History:   Diagnosis Date    Depression     Diabetes mellitus (HCC)     patient denies    Dyslipidemia 03/26/2019    GERD (gastroesophageal reflux disease)     Hyperlipidemia     Hypertension     Prediabetes     Prediabetes 04/03/2019    Stroke ([facility])      Present on Admission:   Altered mental status   Type 2 diabetes mellitus with other diabetic ophthalmic complication (HCC)   Primary hypertension   GERD (gastroesophageal reflux disease)   Focal epilepsy ([facility])   Closed nondisplaced fracture of phalanx of right great toe      Admitting Diagnosis: Altered mental status [R41.82]  Closed nondisplaced fracture of distal phalanx of right great toe, initial encounter [S92.424A]  Age/Sex: 64 y.o. male    Network Utilization Review Department  ATTENTION: Please call with any questions or concerns to [phone] and carefully listen to the prompts so that you are directed to the right person. All voicemails are confidential.   For Discharge needs, contact Care Management DC Support Team at [phone] opt. [phone]  Send all requests for admission clinical reviews, approved or denied determinations and any other requests to dedicated fax number below belonging to the campus where the patient is receiving treatment. List of dedicated fax numbers for the Facilities:  FACILITY NAME UR FAX NUMBER   ADMISSION DENIALS (Administrative/Medical Necessity) [phone]   DISCHARGE  SUPPORT TEAM (NETWORK) [phone]   PARENT CHILD HEALTH (Maternity/NICU/Pediatrics) [phone]   [facility] [phone]   [facility] [phone]   Replaced by [facility] [phone]   [facility] [phone]   [facility] [phone]   [facility] [phone]   [facility] [phone]   [facility] [phone]   [facility] [phone]   [facility] [phone]   [facility] [phone]   [facility] [phone]

## 2024-12-27 NOTE — CASE MANAGEMENT
Case Management Discharge Planning Note    Patient name Constanza Zhu  Location /-01 MRN [id number]  : 1960 Date 2024       Current Admission Date: 2024  Current Admission Diagnosis:Altered mental status   Patient Active Problem List    Diagnosis Date Noted Date Diagnosed    Closed nondisplaced fracture of phalanx of right great toe 2024     Cerebral ventriculomegaly 10/10/2024     Iliac artery stenosis, left ([facility]) 2024     Carotid stenosis, bilateral 2024     PVC (premature ventricular contraction) 2024     Acute stroke due to ischemia ([facility]) 2024     Internal carotid artery stenosis, left 2024     Acute CVA (cerebrovascular accident) ([facility]) 2024     COVID 2024     Internal carotid artery stent present 2024     Vision problem 2024     Dry eyes 2024     Focal epilepsy ([facility]) 2023     Claudication of both lower extremities ([facility]) 2023     Type 2 diabetes mellitus with other diabetic ophthalmic complication ([facility]) 2023     Aphasia 2023    Atherosclerosis of native arteries of extremities with intermittent claudication, bilateral legs ([facility]) 2023    Benign prostatic hyperplasia with lower urinary tract symptoms 2023    Possible NPH (normal pressure hydrocephalus) ([facility]) 2023     Muscle spasms of both lower extremities 2023     Multiple thyroid nodules 2023     Abnormal laboratory test 05/15/2023     History of tobacco use 2023     Chronic ischemic left MCA stroke 2023     Infrarenal abdominal aortic aneurysm (AAA) without rupture ([facility]) 2023     Tachycardia 2023     Prediabetes 2023     SIRS (systemic inflammatory response syndrome) ([facility]) 2023     Recurrent strokes ([facility]) 2023     Hyponatremia 2023     Middle cerebral artery stenosis, right 2023     Altered mental status 2023      Chronic low back pain 04/16/2023     Hypertensive encephalopathy, transient 01/12/2023     Snoring 08/10/2020     Memory deficits 08/10/2020     Status post placement of implantable loop recorder 04/06/2019     History of prediabetes 04/03/2019     Anxiety and depression 03/29/2019     Insomnia 03/29/2019     Fall 03/28/2019     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) 03/28/2019     Urinary retention 03/27/2019     At risk for venous thromboembolism (VTE) 03/26/2019     Hypertriglyceridemia 03/26/2019     Nicotine dependence 03/26/2019     Left carotid stenosis 03/26/2019     Presbyopia 03/26/2019     History of stroke 03/19/2019     Headache 03/19/2019     Primary hypertension 03/19/2019     GERD (gastroesophageal reflux disease) 03/19/2019       LOS (days): 0  Geometric Mean LOS (GMLOS) (days):   Days to GMLOS:     OBJECTIVE:  Risk of Unplanned Readmission Score: 18.8         Current admission status: Inpatient   Preferred Pharmacy:   [facility] [address] [place] [id number]  Phone: [phone] Fax: [phone]    [facility] - [address] [address]  Phone: [phone] Fax: [phone]    Primary Care Provider: ERIC Calixto    Primary Insurance: BLUE CROSS  Secondary Insurance: MEDICARE    DISCHARGE DETAILS:     CM discussed with pt's spouse discharge plan. Spouse [facility]. [facility] [place]

## 2024-12-27 NOTE — PLAN OF CARE
Problem: Potential for Falls  Goal: Patient will remain free of falls  Description: INTERVENTIONS:  - Educate patient/family on patient safety including physical limitations  - Instruct patient to call for assistance with activity   - Consult OT/PT to assist with strengthening/mobility   - Keep Call bell within reach  - Keep bed low and locked with side rails adjusted as appropriate  - Keep care items and personal belongings within reach  - Initiate and maintain comfort rounds  - Make Fall Risk Sign visible to staff  - Offer Toileting every  Hours, in advance of need  - Initiate/Maintain alarm  - Obtain necessary fall risk management equipment:  - Apply yellow socks and bracelet for high fall risk patients  - Consider moving patient to room near nurses station  Outcome: Progressing     Problem: PAIN - ADULT  Goal: Verbalizes/displays adequate comfort level or baseline comfort level  Description: Interventions:  - Encourage patient to monitor pain and request assistance  - Assess pain using appropriate pain scale  - Administer analgesics based on type and severity of pain and evaluate response  - Implement non-pharmacological measures as appropriate and evaluate response  - Consider cultural and social influences on pain and pain management  - Notify physician/advanced practitioner if interventions unsuccessful or patient reports new pain  Outcome: Progressing     Problem: INFECTION - ADULT  Goal: Absence or prevention of progression during hospitalization  Description: INTERVENTIONS:  - Assess and monitor for signs and symptoms of infection  - Monitor lab/diagnostic results  - Monitor all insertion sites, i.e. indwelling lines, tubes, and drains  - Monitor endotracheal if appropriate and nasal secretions for changes in amount and color  - [place] appropriate cooling/warming therapies per order  - Administer medications as ordered  - Instruct and encourage patient and family to use good hand hygiene technique  -  Identify and instruct in appropriate isolation precautions for identified infection/condition  Outcome: Progressing  Goal: Absence of fever/infection during neutropenic period  Description: INTERVENTIONS:  - Monitor WBC    Outcome: Progressing     Problem: SAFETY ADULT  Goal: Patient will remain free of falls  Description: INTERVENTIONS:  - Educate patient/family on patient safety including physical limitations  - Instruct patient to call for assistance with activity   - Consult OT/PT to assist with strengthening/mobility   - Keep Call bell within reach  - Keep bed low and locked with side rails adjusted as appropriate  - Keep care items and personal belongings within reach  - Initiate and maintain comfort rounds  - Make Fall Risk Sign visible to staff  - Offer Toileting every  Hours, in advance of need  - Initiate/Maintain alarm  - Obtain necessary fall risk management equipment:   - Apply yellow socks and bracelet for high fall risk patients  - Consider moving patient to room near nurses station  Outcome: Progressing  Goal: Maintain or return to baseline ADL function  Description: INTERVENTIONS:  -  Assess patient's ability to carry out ADLs; assess patient's baseline for ADL function and identify physical deficits which impact ability to perform ADLs (bathing, care of mouth/teeth, toileting, grooming, dressing, etc.)  - Assess/evaluate cause of self-care deficits   - Assess range of motion  - Assess patient's mobility; develop plan if impaired  - Assess patient's need for assistive devices and provide as appropriate  - Encourage maximum independence but intervene and supervise when necessary  - Involve family in performance of ADLs  - Assess for home care needs following discharge   - Consider OT consult to assist with ADL evaluation and planning for discharge  - Provide patient education as appropriate  Outcome: Progressing  Goal: Maintains/Returns to pre admission functional level  Description:  INTERVENTIONS:  - Perform AM-PAC 6 Click Basic Mobility/ Daily Activity assessment daily.  - Set and communicate daily mobility goal to care team and patient/family/caregiver.   - Collaborate with rehabilitation services on mobility goals if consulted  - Perform Range of Motion  times a day.  - Reposition patient every  hours.  - Dangle patient times a day  - Stand patient  times a day  - Ambulate patient  times a day  - Out of bed to chair  times a day   - Out of bed for meals  times a day  - Out of bed for toileting  - Record patient progress and toleration of activity level   Outcome: Progressing     Problem: DISCHARGE PLANNING  Goal: Discharge to home or other facility with appropriate resources  Description: INTERVENTIONS:  - Identify barriers to discharge w/patient and caregiver  - Arrange for needed discharge resources and transportation as appropriate  - Identify discharge learning needs (meds, wound care, etc.)  - Arrange for interpretive services to assist at discharge as needed  - Refer to Case Management Department for coordinating discharge planning if the patient needs post-hospital services based on physician/advanced practitioner order or complex needs related to functional status, cognitive ability, or social support system  Outcome: Progressing     Problem: Knowledge Deficit  Goal: Patient/family/caregiver demonstrates understanding of disease process, treatment plan, medications, and discharge instructions  Description: Complete learning assessment and assess knowledge base.  Interventions:  - Provide teaching at level of understanding  - Provide teaching via preferred learning methods  Outcome: Progressing     Problem: Nutrition/Hydration-ADULT  Goal: Nutrient/Hydration intake appropriate for improving, restoring or maintaining nutritional needs  Description: Monitor and assess patient's nutrition/hydration status for malnutrition. Collaborate with interdisciplinary team and initiate plan and  interventions as ordered.  Monitor patient's weight and dietary intake as ordered or per policy. Utilize nutrition screening tool and intervene as necessary. Determine patient's food preferences and provide high-protein, high-caloric foods as appropriate.     INTERVENTIONS:  - Monitor oral intake, urinary output, labs, and treatment plans  - Assess nutrition and hydration status and recommend course of action  - Evaluate amount of meals eaten  - Assist patient with eating if necessary   - Allow adequate time for meals  - Recommend/ encourage appropriate diets, oral nutritional supplements, and vitamin/mineral supplements  - Order, calculate, and assess calorie counts as needed  - Recommend, monitor, and adjust tube feedings and TPN/PPN based on assessed needs  - Assess need for intravenous fluids  - Provide specific nutrition/hydration education as appropriate  - Include patient/family/caregiver in decisions related to nutrition  Outcome: Progressing     Problem: MOBILITY - ADULT  Goal: Maintain or return to baseline ADL function  Description: INTERVENTIONS:  -  Assess patient's ability to carry out ADLs; assess patient's baseline for ADL function and identify physical deficits which impact ability to perform ADLs (bathing, care of mouth/teeth, toileting, grooming, dressing, etc.)  - Assess/evaluate cause of self-care deficits   - Assess range of motion  - Assess patient's mobility; develop plan if impaired  - Assess patient's need for assistive devices and provide as appropriate  - Encourage maximum independence but intervene and supervise when necessary  - Involve family in performance of ADLs  - Assess for home care needs following discharge   - Consider OT consult to assist with ADL evaluation and planning for discharge  - Provide patient education as appropriate  Outcome: Progressing  Goal: Maintains/Returns to pre admission functional level  Description: INTERVENTIONS:  - Perform AM-PAC 6 Click Basic Mobility/  Daily Activity assessment daily.  - Set and communicate daily mobility goal to care team and patient/family/caregiver.   - Collaborate with rehabilitation services on mobility goals if consulted  - Perform Range of Motion times a day.  - Reposition patient every  hours.  - Dangle patient  times a day  - Stand patient  times a day  - Ambulate patient  times a day  - Out of bed to [place] times a day   - Out of bed for meals  times a day  - Out of bed for toileting  - Record patient progress and toleration of activity level   Outcome: Progressing     Problem: Prexisting or High Potential for Compromised Skin Integrity  Goal: Skin integrity is maintained or improved  Description: INTERVENTIONS:  - Identify patients at risk for skin breakdown  - Assess and monitor skin integrity  - Assess and monitor nutrition and hydration status  - Monitor labs   - Assess for incontinence   - Turn and reposition patient  - Assist with mobility/ambulation  - Relieve pressure over bony prominences  - Avoid friction and shearing  - Provide appropriate hygiene as needed including keeping skin clean and dry  - Evaluate need for skin moisturizer/barrier cream  - Collaborate with interdisciplinary team   - Patient/family teaching  - Consider wound care consult   Outcome: Progressing

## 2024-12-27 NOTE — ASSESSMENT & PLAN NOTE
Patient presents with acute agitation followed by loss of consciousness, increased extremity tone  MRI 9/2024 shows:1. Punctate foci of restricted diffusion within the left occipital region, reflecting acute-subacute infarct.   2. Moderate to advanced generalized cortical atrophy. There is ventricular dilation which is greater than/out of proportion to the degree of cortical atrophy and can be seen in the setting of normal pressure hydrocephalus.   3. Left parieto-occipital encephalomalacia.   Patient received 1000mg IV keppra in ER  Patient appears to have dementia at baseline.  Has cognitive decline as well as needing help with his ADLs  He had a delirium episode the other day where he was aggravated and confused which ultimately brought him to the hospital  Continue home Keppra 1500 mg BID  Clarified with spouse, patient had a delirium like episode with rage followed by an episode lasting around 20 minutes where patient was very rigid and loc.  EMS had a difficult time fitting a blood pressure cuff because of this rigidity  No fever noted  Only medication change recently was Keppra was increased to 1500 mg twice daily  On literature review it looks like Keppra has a rare side effect of dystonia?    On medication review patient looks like she was on mirtazapine as well as trazodone.  Possible serotonin syndrome like episode?  Did advise to discontinue trazodone  Close follow-up with the epilepsy team should be done.  Discussed this with the spouse and she agrees  Patient also has clinical diagnosis of dementia due to cognitive decline and help with ADLs.  Would benefit from a geriatrics consult.  Medically clear for discharge

## 2024-12-27 NOTE — PLAN OF CARE
Problem: Potential for Falls  Goal: Patient will remain free of falls  Description: INTERVENTIONS:  - Educate patient/family on patient safety including physical limitations  - Instruct patient to call for assistance with activity   - Consult OT/PT to assist with strengthening/mobility   - Keep Call bell within reach  - Keep bed low and locked with side rails adjusted as appropriate  - Keep care items and personal belongings within reach  - Initiate and maintain comfort rounds  - Make Fall Risk Sign visible to staff  - Offer Toileting every 2 Hours, in advance of need  - Initiate/Maintain alarm  - Obtain necessary fall risk management equipment:   - Apply yellow socks and bracelet for high fall risk patients  - Consider moving patient to room near nurses station  Outcome: Progressing     Problem: PAIN - ADULT  Goal: Verbalizes/displays adequate comfort level or baseline comfort level  Description: Interventions:  - Encourage patient to monitor pain and request assistance  - Assess pain using appropriate pain scale  - Administer analgesics based on type and severity of pain and evaluate response  - Implement non-pharmacological measures as appropriate and evaluate response  - Consider cultural and social influences on pain and pain management  - Notify physician/advanced practitioner if interventions unsuccessful or patient reports new pain  Outcome: Progressing     Problem: INFECTION - ADULT  Goal: Absence or prevention of progression during hospitalization  Description: INTERVENTIONS:  - Assess and monitor for signs and symptoms of infection  - Monitor lab/diagnostic results  - Monitor all insertion sites, i.e. indwelling lines, tubes, and drains  - Monitor endotracheal if appropriate and nasal secretions for changes in amount and color  - [place] appropriate cooling/warming therapies per order  - Administer medications as ordered  - Instruct and encourage patient and family to use good hand hygiene  technique  - Identify and instruct in appropriate isolation precautions for identified infection/condition  Outcome: Progressing  Goal: Absence of fever/infection during neutropenic period  Description: INTERVENTIONS:  - Monitor WBC    Outcome: Progressing     Problem: SAFETY ADULT  Goal: Patient will remain free of falls  Description: INTERVENTIONS:  - Educate patient/family on patient safety including physical limitations  - Instruct patient to call for assistance with activity   - Consult OT/PT to assist with strengthening/mobility   - Keep Call bell within reach  - Keep bed low and locked with side rails adjusted as appropriate  - Keep care items and personal belongings within reach  - Initiate and maintain comfort rounds  - Make Fall Risk Sign visible to staff  - Offer Toileting every 2 Hours, in advance of need  - Initiate/Maintain alarm  - Obtain necessary fall risk management equipment:   - Apply yellow socks and bracelet for high fall risk patients  - Consider moving patient to room near nurses station  Outcome: Progressing  Goal: Maintain or return to baseline ADL function  Description: INTERVENTIONS:  -  Assess patient's ability to carry out ADLs; assess patient's baseline for ADL function and identify physical deficits which impact ability to perform ADLs (bathing, care of mouth/teeth, toileting, grooming, dressing, etc.)  - Assess/evaluate cause of self-care deficits   - Assess range of motion  - Assess patient's mobility; develop plan if impaired  - Assess patient's need for assistive devices and provide as appropriate  - Encourage maximum independence but intervene and supervise when necessary  - Involve family in performance of ADLs  - Assess for home care needs following discharge   - Consider OT consult to assist with ADL evaluation and planning for discharge  - Provide patient education as appropriate  Outcome: Progressing  Goal: Maintains/Returns to pre admission functional level  Description:  INTERVENTIONS:  - Perform AM-PAC 6 Click Basic Mobility/ Daily Activity assessment daily.  - Set and communicate daily mobility goal to care team and patient/family/caregiver.   - Collaborate with rehabilitation services on mobility goals if consulted  - Perform Range of Motion 2 times a day.  - Reposition patient every 2 hours.  - Dangle patient 2 times a day  - Stand patient 2 times a day  - Ambulate patient 2 times a day  - Out of bed to chair 2 times a day   - Out of bed for meals 2 times a day  - Out of bed for toileting  - Record patient progress and toleration of activity level   Outcome: Progressing     Problem: DISCHARGE PLANNING  Goal: Discharge to home or other facility with appropriate resources  Description: INTERVENTIONS:  - Identify barriers to discharge w/patient and caregiver  - Arrange for needed discharge resources and transportation as appropriate  - Identify discharge learning needs (meds, wound care, etc.)  - Arrange for interpretive services to assist at discharge as needed  - Refer to Case Management Department for coordinating discharge planning if the patient needs post-hospital services based on physician/advanced practitioner order or complex needs related to functional status, cognitive ability, or social support system  Outcome: Progressing     Problem: Knowledge Deficit  Goal: Patient/family/caregiver demonstrates understanding of disease process, treatment plan, medications, and discharge instructions  Description: Complete learning assessment and assess knowledge base.  Interventions:  - Provide teaching at level of understanding  - Provide teaching via preferred learning methods  Outcome: Progressing     Problem: Prexisting or High Potential for Compromised Skin Integrity  Goal: Skin integrity is maintained or improved  Description: INTERVENTIONS:  - Identify patients at risk for skin breakdown  - Assess and monitor skin integrity  - Assess and monitor nutrition and hydration  status  - Monitor labs   - Assess for incontinence   - Turn and reposition patient  - Assist with mobility/ambulation  - Relieve pressure over bony prominences  - Avoid friction and shearing  - Provide appropriate hygiene as needed including keeping skin clean and dry  - Evaluate need for skin moisturizer/barrier cream  - Collaborate with interdisciplinary team   - Patient/family teaching  - Consider wound care consult   Outcome: Progressing

## 2024-12-27 NOTE — ASSESSMENT & PLAN NOTE
Bp elevated to 180s during admission  Per neuro, cardiology, vascular notes, bp<130 goal outpatient  Patients spouse was very concerned. She was previously told to keep BP greater than 140 due to strokes. I advised to followup with outpatient doctors and confirm. Based on latest notes bp goal is 130  Will add HCTZ  Continue with metoprolol 50mg daily

## 2024-12-27 NOTE — ASSESSMENT & PLAN NOTE
Lab Results   Component Value Date    HGBA1C 5.7 (H) 12/24/2024       Recent Labs     12/26/24  1130 12/26/24  1630 12/26/24 2036 12/27/24  0707   POCGLU 112 116 149* 114       Blood Sugar Average: Last 72 hrs:  (P) [id number]  Continue home meds

## 2024-12-27 NOTE — PLAN OF CARE
Problem: Potential for Falls  Goal: Patient will remain free of falls  Description: INTERVENTIONS:  - Educate patient/family on patient safety including physical limitations  - Instruct patient to call for assistance with activity   - Consult OT/PT to assist with strengthening/mobility   - Keep Call bell within reach  - Keep bed low and locked with side rails adjusted as appropriate  - Keep care items and personal belongings within reach  - Initiate and maintain comfort rounds  - Make Fall Risk Sign visible to staff  - Offer Toileting every 2 Hours, in advance of need  - Initiate/Maintain bed/chair alarm  - Obtain necessary fall risk management equipment: bed/chair alarm   - Apply yellow socks and bracelet for high fall risk patients  - Consider moving patient to room near nurses station  Outcome: Progressing     Problem: PAIN - ADULT  Goal: Verbalizes/displays adequate comfort level or baseline comfort level  Description: Interventions:  - Encourage patient to monitor pain and request assistance  - Assess pain using appropriate pain scale  - Administer analgesics based on type and severity of pain and evaluate response  - Implement non-pharmacological measures as appropriate and evaluate response  - Consider cultural and social influences on pain and pain management  - Notify physician/advanced practitioner if interventions unsuccessful or patient reports new pain  Outcome: Progressing     Problem: INFECTION - ADULT  Goal: Absence or prevention of progression during hospitalization  Description: INTERVENTIONS:  - Assess and monitor for signs and symptoms of infection  - Monitor lab/diagnostic results  - Monitor all insertion sites, i.e. indwelling lines, tubes, and drains  - Monitor endotracheal if appropriate and nasal secretions for changes in amount and color  - Nolan appropriate cooling/warming therapies per order  - Administer medications as ordered  - Instruct and encourage patient and family to use good  hand hygiene technique  - Identify and instruct in appropriate isolation precautions for identified infection/condition  Outcome: Progressing  Goal: Absence of fever/infection during neutropenic period  Description: INTERVENTIONS:  - Monitor WBC    Outcome: Progressing     Problem: SAFETY ADULT  Goal: Patient will remain free of falls  Description: INTERVENTIONS:  - Educate patient/family on patient safety including physical limitations  - Instruct patient to call for assistance with activity   - Consult OT/PT to assist with strengthening/mobility   - Keep Call bell within reach  - Keep bed low and locked with side rails adjusted as appropriate  - Keep care items and personal belongings within reach  - Initiate and maintain comfort rounds  - Make Fall Risk Sign visible to staff  - Offer Toileting every 3 Hours, in advance of need  - Initiate/Maintain chair & bed alarm  - Obtain necessary fall risk management equipment: yellow socks and bracelet   - Apply yellow socks and bracelet for high fall risk patients  - Consider moving patient to room near nurses station  Outcome: Progressing  Goal: Maintain or return to baseline ADL function  Description: INTERVENTIONS:  -  Assess patient's ability to carry out ADLs; assess patient's baseline for ADL function and identify physical deficits which impact ability to perform ADLs (bathing, care of mouth/teeth, toileting, grooming, dressing, etc.)  - Assess/evaluate cause of self-care deficits   - Assess range of motion  - Assess patient's mobility; develop plan if impaired  - Assess patient's need for assistive devices and provide as appropriate  - Encourage maximum independence but intervene and supervise when necessary  - Involve family in performance of ADLs  - Assess for home care needs following discharge   - Consider OT consult to assist with ADL evaluation and planning for discharge  - Provide patient education as appropriate  Outcome: Progressing  Goal: Maintains/Returns  to pre admission functional level  Description: INTERVENTIONS:  - Perform AM-PAC 6 Click Basic Mobility/ Daily Activity assessment daily.  - Set and communicate daily mobility goal to care team and patient/family/caregiver.   - Collaborate with rehabilitation services on mobility goals if consulted  - Perform Range of Motion 3 times a day.  - Reposition patient every 3 hours.  - Dangle patient 3 times a day  - Stand patient 3 times a day  - Ambulate patient 3 times a day  - Out of bed to chair 3 times a day   - Out of bed for meals 3 times a day  - Out of bed for toileting  - Record patient progress and toleration of activity level   Outcome: Progressing     Problem: DISCHARGE PLANNING  Goal: Discharge to home or other facility with appropriate resources  Description: INTERVENTIONS:  - Identify barriers to discharge w/patient and caregiver  - Arrange for needed discharge resources and transportation as appropriate  - Identify discharge learning needs (meds, wound care, etc.)  - Arrange for interpretive services to assist at discharge as needed  - Refer to Case Management Department for coordinating discharge planning if the patient needs post-hospital services based on physician/advanced practitioner order or complex needs related to functional status, cognitive ability, or social support system  Outcome: Progressing     Problem: Knowledge Deficit  Goal: Patient/family/caregiver demonstrates understanding of disease process, treatment plan, medications, and discharge instructions  Description: Complete learning assessment and assess knowledge base.  Interventions:  - Provide teaching at level of understanding  - Provide teaching via preferred learning methods  Outcome: Progressing     Problem: Nutrition/Hydration-ADULT  Goal: Nutrient/Hydration intake appropriate for improving, restoring or maintaining nutritional needs  Description: Monitor and assess patient's nutrition/hydration status for malnutrition.  Collaborate with interdisciplinary team and initiate plan and interventions as ordered.  Monitor patient's weight and dietary intake as ordered or per policy. Utilize nutrition screening tool and intervene as necessary. Determine patient's food preferences and provide high-protein, high-caloric foods as appropriate.     INTERVENTIONS:  - Monitor oral intake, urinary output, labs, and treatment plans  - Assess nutrition and hydration status and recommend course of action  - Evaluate amount of meals eaten  - Assist patient with eating if necessary   - Allow adequate time for meals  - Recommend/ encourage appropriate diets, oral nutritional supplements, and vitamin/mineral supplements  - Order, calculate, and assess calorie counts as needed  - Recommend, monitor, and adjust tube feedings and TPN/PPN based on assessed needs  - Assess need for intravenous fluids  - Provide specific nutrition/hydration education as appropriate  - Include patient/family/caregiver in decisions related to nutrition  Outcome: Progressing     Problem: MOBILITY - ADULT  Goal: Maintain or return to baseline ADL function  Description: INTERVENTIONS:  -  Assess patient's ability to carry out ADLs; assess patient's baseline for ADL function and identify physical deficits which impact ability to perform ADLs (bathing, care of mouth/teeth, toileting, grooming, dressing, etc.)  - Assess/evaluate cause of self-care deficits   - Assess range of motion  - Assess patient's mobility; develop plan if impaired  - Assess patient's need for assistive devices and provide as appropriate  - Encourage maximum independence but intervene and supervise when necessary  - Involve family in performance of ADLs  - Assess for home care needs following discharge   - Consider OT consult to assist with ADL evaluation and planning for discharge  - Provide patient education as appropriate  Outcome: Progressing  Goal: Maintains/Returns to pre admission functional  level  Description: INTERVENTIONS:  - Perform AM-PAC 6 Click Basic Mobility/ Daily Activity assessment daily.  - Set and communicate daily mobility goal to care team and patient/family/caregiver.   - Collaborate with rehabilitation services on mobility goals if consulted  - Perform Range of Motion 3 times a day.  - Reposition patient every 3 hours.  - Dangle patient 3 times a day  - Stand patient 3 times a day  - Ambulate patient 3 times a day  - Out of bed to chair 3 times a day   - Out of bed for meals 3 times a day  - Out of bed for toileting  - Record patient progress and toleration of activity level   Outcome: Progressing     Problem: Prexisting or High Potential for Compromised Skin Integrity  Goal: Skin integrity is maintained or improved  Description: INTERVENTIONS:  - Identify patients at risk for skin breakdown  - Assess and monitor skin integrity  - Assess and monitor nutrition and hydration status  - Monitor labs   - Assess for incontinence   - Turn and reposition patient  - Assist with mobility/ambulation  - Relieve pressure over bony prominences  - Avoid friction and shearing  - Provide appropriate hygiene as needed including keeping skin clean and dry  - Evaluate need for skin moisturizer/barrier cream  - Collaborate with interdisciplinary team   - Patient/family teaching  - Consider wound care consult   Outcome: Progressing

## 2024-12-27 NOTE — PROGRESS NOTES
Patient:  LISSA MYERS    MRN:  [id number]    Aidin Request ID:  [id number]    Level of care reserved:  Home Health Agency    Partner Reserved:  [facility], [place] [address] [phone]    Clinical needs requested:    Geography searched:  [id number]    Start of Service:    Request sent:  7:32am EST on 12/27/2024 by Delmis Britt    Partner reserved:  1:01pm EST on 12/27/2024 by Delmis Britt    Choice list shared:  1:01pm EST on 12/27/2024 by Delmis Britt

## 2024-12-28 NOTE — UTILIZATION REVIEW
NOTIFICATION OF INPATIENT ADMISSION   AUTHORIZATION REQUEST   SERVICING FACILITY:   [place] [address]  Tax ID: [id number]  NPI: [id number] ATTENDING PROVIDER:  Attending Name and NPI#: Kody Ma Md [[id number]]  Address: [address] [place] [address]  Phone: [phone]   ADMISSION INFORMATION:  Place of Service: Inpatient Acute [facility]  Place of Service Code: 21  Inpatient Admission Date/Time: 12/27/24  8:09 AM  Discharge Date/Time: 12/27/2024  4:19 PM  Admitting Diagnosis Code/Description:  Altered mental status [R41.82]  Closed nondisplaced fracture of distal phalanx of right great toe, initial encounter [S92.424A]     UTILIZATION REVIEW CONTACT:  Mairna Antunez, Utilization   Network Utilization Review Department  Phone: [phone]  Fax: [phone]  Email: [email or web address]  Contact for approvals/pending authorizations, clinical reviews, and discharge.     PHYSICIAN ADVISORY SERVICES:  Medical Necessity Denial & Lmla-gz-Dbmk Review  Phone: [phone]  Fax: [phone]  Email: [email or web address]     DISCHARGE SUPPORT TEAM:  For Patients Discharge Needs & Updates  Phone: [phone] opt. 2 Fax: [phone]  Email: [email or web address]

## 2024-12-30 NOTE — UTILIZATION REVIEW
NOTIFICATION OF ADMISSION DISCHARGE   This is a Notification of Discharge from [facility]. Please be advised that this patient has been discharge from our facility. Below you will find the admission and discharge date and time including the patient’s disposition.   UTILIZATION REVIEW CONTACT:  Marina Antunez  Utilization   Network Utilization Review Department  Phone: [phone] x carefully listen to the prompts. All voicemails are confidential.  Email: [email or web address]     ADMISSION INFORMATION  PRESENTATION DATE: 12/24/2024  9:15 AM  OBERVATION ADMISSION DATE: 12/24/2024 1301  INPATIENT ADMISSION DATE: 12/27/24  8:09 AM   DISCHARGE DATE: 12/27/2024  4:19 PM   DISPOSITION:Home with Home Health Care    Network Utilization Review Department  ATTENTION: Please call with any questions or concerns to [phone] and carefully listen to the prompts so that you are directed to the right person. All voicemails are confidential.   For Discharge needs, contact Care Management DC Support Team at [phone] opt. 2  Send all requests for admission clinical reviews, approved or denied determinations and any other requests to dedicated fax number below belonging to the campus where the patient is receiving treatment. List of dedicated fax numbers for the Facilities:  FACILITY NAME UR FAX NUMBER   ADMISSION DENIALS (Administrative/Medical Necessity) [phone]   DISCHARGE SUPPORT TEAM ([facility]) [phone]   PARENT CHILD HEALTH (Maternity/NICU/Pediatrics) [phone]   [facility] [phone]   [facility] [phone]   [facility] [phone]   [facility] [phone]   [facility] [phone]   [facility] [phone]   [facility] [phone]   [facility]  [facility] [phone]   [facility] [phone]   [facility] [phone]   [facility] [phone]   [facility] [phone]

## 2024-12-30 NOTE — CASE MANAGEMENT
Case Management Progress Note    Patient name Constanza Zhu  Location /-01 MRN [id number]  : 1960 Date 2024       LOS (days): 1  Geometric Mean LOS (GMLOS) (days):   Days to GMLOS:        OBJECTIVE:        Current admission status: Inpatient  Preferred Pharmacy:   [id number] - [facility] [address] [address]  Phone: [phone] Fax: [phone]    [facility] - [place], [place] - [address] [place], [place] [address] [facility] [address]  Phone: [phone] Fax: [phone]    Primary Care Provider: ERIC Calixto    Primary Insurance: BLUE CROSS  Secondary Insurance: MEDICARE    PROGRESS NOTE:    Notification made to OP CM Handoff: [facility] OP CM regarding discharge planning and disposition.   Message left for Eun GLASS

## 2025-01-03 ENCOUNTER — OFFICE VISIT (OUTPATIENT)
Dept: PODIATRY | Facility: CLINIC | Age: 65
End: 2025-01-03
Payer: COMMERCIAL

## 2025-01-03 VITALS — WEIGHT: 198 LBS | HEIGHT: 66 IN | BODY MASS INDEX: 31.82 KG/M2

## 2025-01-03 DIAGNOSIS — S92.424S CLOSED NONDISPLACED FRACTURE OF DISTAL PHALANX OF RIGHT GREAT TOE, SEQUELA: ICD-10-CM

## 2025-01-03 PROCEDURE — 99213 OFFICE O/P EST LOW 20 MIN: CPT | Performed by: PODIATRIST

## 2025-01-03 NOTE — PROGRESS NOTES
Assessment/Plan:   Overall patient reports no pain/discomfort coming from his right great toe.  X-rays from right foot personally reviewed show questionable old fracture involving the lateral base of the right distal phalanx.  No pain with precise palpation of this exact spot nor is there any edema noted in that area.  Discontinue postop shoe, return to sneaker and continue with all activities without restrictions.  Follow-up as needed.       Diagnoses and all orders for this visit:    Closed nondisplaced fracture of distal phalanx of right great toe, sequela  -     Ambulatory Referral to Podiatry          Subjective:     Patient ID: Constanza Zhu is a 64 y.o. male.    1/3/2025: 64-year-old male seen today for follow-up evaluation of suspected fracture involving his right great toe.  Patient and his wife report that he is not having any pain or discomfort from this region and there is conflicting opinion on whether he has a fracture or not.  Denies any new pain or discomfort.  Denies any shortness of breath.  Entire event revolves around a altered mental status/seizure event which led to some injuries but overall he is recovered and doing well.  Past medical history of prior CVA.        Review of Systems   Constitutional: Negative.    HENT: Negative.     Eyes: Negative.    Respiratory: Negative.     Cardiovascular: Negative.    Gastrointestinal: Negative.    Endocrine: Negative.    Genitourinary: Negative.    Musculoskeletal: Negative.  Negative for arthralgias and joint swelling.   Skin: Negative.    Allergic/Immunologic: Negative.    Neurological: Negative.    Hematological: Negative.    Psychiatric/Behavioral: Negative.           Objective:     Physical Exam  Constitutional:       Appearance: Normal appearance.   HENT:      Head: Normocephalic.      Right Ear: Tympanic membrane normal.      Left Ear: Tympanic membrane normal.      Nose: No congestion.      Mouth/Throat:      Pharynx: No oropharyngeal exudate or  posterior oropharyngeal erythema.   Eyes:      Conjunctiva/sclera: Conjunctivae normal.      Pupils: Pupils are equal, round, and reactive to light.   Cardiovascular:      Rate and Rhythm: Normal rate and regular rhythm.      Pulses: Normal pulses.   Pulmonary:      Effort: Pulmonary effort is normal.   Musculoskeletal:         General: No tenderness.      Comments: Right great toe: No pain with focal palpation of suspected fracture site area on lateral distal phalanx base.  No edema or erythema noted.  No pain with range of motion of the interphalangeal joint or the MPJ.   Feet:      Right foot:      Protective Sensation: 10 sites tested.        Left foot:      Protective Sensation: 10 sites tested.     Skin:     General: Skin is warm and dry.      Capillary Refill: Capillary refill takes 2 to 3 seconds.      Coloration: Skin is not pale.      Findings: No bruising, erythema, lesion or rash.   Neurological:      General: No focal deficit present.      Mental Status: He is alert.      Cranial Nerves: No cranial nerve deficit.      Sensory: No sensory deficit.      Motor: No weakness.      Gait: Gait normal.      Deep Tendon Reflexes: Reflexes normal.   Psychiatric:         Mood and Affect: Mood normal.         Behavior: Behavior normal.         Judgment: Judgment normal.

## 2025-01-22 ENCOUNTER — OFFICE VISIT (OUTPATIENT)
Dept: NEUROLOGY | Facility: CLINIC | Age: 65
End: 2025-01-22
Payer: COMMERCIAL

## 2025-01-22 VITALS
HEIGHT: 66 IN | OXYGEN SATURATION: 98 % | WEIGHT: 198 LBS | DIASTOLIC BLOOD PRESSURE: 64 MMHG | SYSTOLIC BLOOD PRESSURE: 135 MMHG | HEART RATE: 74 BPM | TEMPERATURE: 98.4 F | BODY MASS INDEX: 31.82 KG/M2

## 2025-01-22 DIAGNOSIS — G40.109 FOCAL EPILEPSY (HCC): Primary | ICD-10-CM

## 2025-01-22 DIAGNOSIS — I63.9 ACUTE CVA (CEREBROVASCULAR ACCIDENT) (HCC): ICD-10-CM

## 2025-01-22 PROCEDURE — 99213 OFFICE O/P EST LOW 20 MIN: CPT | Performed by: NURSE PRACTITIONER
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
## 2025-03-08 ENCOUNTER — APPOINTMENT (EMERGENCY)
Dept: CT IMAGING | Facility: HOSPITAL | Age: 65
End: 2025-03-08
Payer: COMMERCIAL

## 2025-03-08 ENCOUNTER — APPOINTMENT (EMERGENCY)
Dept: RADIOLOGY | Facility: HOSPITAL | Age: 65
End: 2025-03-08
Payer: COMMERCIAL

## 2025-03-08 ENCOUNTER — HOSPITAL ENCOUNTER (INPATIENT)
Facility: HOSPITAL | Age: 65
LOS: 2 days | Discharge: HOME | End: 2025-03-10
Attending: EMERGENCY MEDICINE | Admitting: INTERNAL MEDICINE
Payer: COMMERCIAL

## 2025-03-08 ENCOUNTER — TELEPHONE (OUTPATIENT)
Dept: OTHER | Facility: OTHER | Age: 65
End: 2025-03-08

## 2025-03-08 DIAGNOSIS — R41.82 ALTERED MENTAL STATUS: Primary | ICD-10-CM

## 2025-03-08 DIAGNOSIS — R55 SYNCOPE: ICD-10-CM

## 2025-03-08 DIAGNOSIS — R56.9 SEIZURES (HCC): ICD-10-CM

## 2025-03-08 DIAGNOSIS — E86.0 DEHYDRATION: ICD-10-CM

## 2025-03-08 PROBLEM — R25.1 TREMOR OF RIGHT HAND: Status: ACTIVE | Noted: 2025-03-08

## 2025-03-08 LAB
2HR DELTA HS TROPONIN: -1 NG/L
ALBUMIN SERPL BCG-MCNC: 4.3 G/DL (ref 3.5–5)
ALP SERPL-CCNC: 124 U/L (ref 34–104)
ALT SERPL W P-5'-P-CCNC: 26 U/L (ref 7–52)
ANION GAP SERPL CALCULATED.3IONS-SCNC: 6 MMOL/L (ref 4–13)
AST SERPL W P-5'-P-CCNC: 20 U/L (ref 13–39)
ATRIAL RATE: 71 BPM
BACTERIA UR QL AUTO: NORMAL /HPF
BASOPHILS # BLD AUTO: 0.09 THOUSANDS/ÂΜL (ref 0–0.1)
BASOPHILS NFR BLD AUTO: 1 % (ref 0–1)
BILIRUB SERPL-MCNC: 0.44 MG/DL (ref 0.2–1)
BILIRUB UR QL STRIP: NEGATIVE
BUN SERPL-MCNC: 28 MG/DL (ref 5–25)
CALCIUM SERPL-MCNC: 9.5 MG/DL (ref 8.4–10.2)
CARDIAC TROPONIN I PNL SERPL HS: 8 NG/L (ref ?–50)
CARDIAC TROPONIN I PNL SERPL HS: 9 NG/L (ref ?–50)
CHLORIDE SERPL-SCNC: 103 MMOL/L (ref 96–108)
CLARITY UR: CLEAR
CO2 SERPL-SCNC: 30 MMOL/L (ref 21–32)
COLOR UR: YELLOW
CREAT SERPL-MCNC: 1.09 MG/DL (ref 0.6–1.3)
EOSINOPHIL # BLD AUTO: 0.27 THOUSAND/ÂΜL (ref 0–0.61)
EOSINOPHIL NFR BLD AUTO: 3 % (ref 0–6)
ERYTHROCYTE [DISTWIDTH] IN BLOOD BY AUTOMATED COUNT: 13.8 % (ref 11.6–15.1)
FLUAV RNA RESP QL NAA+PROBE: NEGATIVE
FLUBV RNA RESP QL NAA+PROBE: NEGATIVE
GFR SERPL CREATININE-BSD FRML MDRD: 70 ML/MIN/1.73SQ M
GLUCOSE SERPL-MCNC: 112 MG/DL (ref 65–140)
GLUCOSE SERPL-MCNC: 82 MG/DL (ref 65–140)
GLUCOSE SERPL-MCNC: 98 MG/DL (ref 65–140)
GLUCOSE UR STRIP-MCNC: ABNORMAL MG/DL
HCT VFR BLD AUTO: 47.6 % (ref 36.5–49.3)
HGB BLD-MCNC: 15.2 G/DL (ref 12–17)
HGB UR QL STRIP.AUTO: NEGATIVE
IMM GRANULOCYTES # BLD AUTO: 0.02 THOUSAND/UL (ref 0–0.2)
IMM GRANULOCYTES NFR BLD AUTO: 0 % (ref 0–2)
KETONES UR STRIP-MCNC: NEGATIVE MG/DL
LEUKOCYTE ESTERASE UR QL STRIP: NEGATIVE
LEVETIRACETAM SERPL-MCNC: 62.6 UG/ML (ref 12–46)
LYMPHOCYTES # BLD AUTO: 1.86 THOUSANDS/ÂΜL (ref 0.6–4.47)
LYMPHOCYTES NFR BLD AUTO: 24 % (ref 14–44)
MAGNESIUM SERPL-MCNC: 2.2 MG/DL (ref 1.9–2.7)
MCH RBC QN AUTO: 29.7 PG (ref 26.8–34.3)
MCHC RBC AUTO-ENTMCNC: 31.9 G/DL (ref 31.4–37.4)
MCV RBC AUTO: 93 FL (ref 82–98)
MONOCYTES # BLD AUTO: 0.73 THOUSAND/ÂΜL (ref 0.17–1.22)
MONOCYTES NFR BLD AUTO: 9 % (ref 4–12)
NEUTROPHILS # BLD AUTO: 4.94 THOUSANDS/ÂΜL (ref 1.85–7.62)
NEUTS SEG NFR BLD AUTO: 63 % (ref 43–75)
NITRITE UR QL STRIP: NEGATIVE
NON-SQ EPI CELLS URNS QL MICRO: NORMAL /HPF
NRBC BLD AUTO-RTO: 0 /100 WBCS
P AXIS: 21 DEGREES
PH UR STRIP.AUTO: 6.5 [PH]
PLATELET # BLD AUTO: 245 THOUSANDS/UL (ref 149–390)
PMV BLD AUTO: 10 FL (ref 8.9–12.7)
POTASSIUM SERPL-SCNC: 3.9 MMOL/L (ref 3.5–5.3)
PR INTERVAL: 176 MS
PROT SERPL-MCNC: 7.1 G/DL (ref 6.4–8.4)
PROT UR STRIP-MCNC: ABNORMAL MG/DL
QRS AXIS: 2 DEGREES
QRSD INTERVAL: 86 MS
QT INTERVAL: 364 MS
QTC INTERVAL: 395 MS
RBC # BLD AUTO: 5.12 MILLION/UL (ref 3.88–5.62)
RBC #/AREA URNS AUTO: NORMAL /HPF
RSV RNA RESP QL NAA+PROBE: NEGATIVE
SARS-COV-2 RNA RESP QL NAA+PROBE: NEGATIVE
SODIUM SERPL-SCNC: 139 MMOL/L (ref 135–147)
SP GR UR STRIP.AUTO: 1.02 (ref 1–1.03)
T WAVE AXIS: 33 DEGREES
UROBILINOGEN UR STRIP-ACNC: <2 MG/DL
VENTRICULAR RATE: 71 BPM
WBC # BLD AUTO: 7.91 THOUSAND/UL (ref 4.31–10.16)
WBC #/AREA URNS AUTO: NORMAL /HPF

## 2025-03-08 PROCEDURE — 81001 URINALYSIS AUTO W/SCOPE: CPT | Performed by: EMERGENCY MEDICINE

## 2025-03-08 PROCEDURE — 96360 HYDRATION IV INFUSION INIT: CPT

## 2025-03-08 PROCEDURE — 84484 ASSAY OF TROPONIN QUANT: CPT

## 2025-03-08 PROCEDURE — 99223 1ST HOSP IP/OBS HIGH 75: CPT

## 2025-03-08 PROCEDURE — 99285 EMERGENCY DEPT VISIT HI MDM: CPT | Performed by: EMERGENCY MEDICINE

## 2025-03-08 PROCEDURE — 0241U HB NFCT DS VIR RESP RNA 4 TRGT: CPT | Performed by: EMERGENCY MEDICINE

## 2025-03-08 PROCEDURE — 70498 CT ANGIOGRAPHY NECK: CPT

## 2025-03-08 PROCEDURE — 96361 HYDRATE IV INFUSION ADD-ON: CPT

## 2025-03-08 PROCEDURE — 70450 CT HEAD/BRAIN W/O DYE: CPT

## 2025-03-08 PROCEDURE — 87086 URINE CULTURE/COLONY COUNT: CPT | Performed by: EMERGENCY MEDICINE

## 2025-03-08 PROCEDURE — 71045 X-RAY EXAM CHEST 1 VIEW: CPT

## 2025-03-08 PROCEDURE — 85025 COMPLETE CBC W/AUTO DIFF WBC: CPT

## 2025-03-08 PROCEDURE — 36415 COLL VENOUS BLD VENIPUNCTURE: CPT

## 2025-03-08 PROCEDURE — 80053 COMPREHEN METABOLIC PANEL: CPT

## 2025-03-08 PROCEDURE — 93010 ELECTROCARDIOGRAM REPORT: CPT | Performed by: INTERNAL MEDICINE

## 2025-03-08 PROCEDURE — 70496 CT ANGIOGRAPHY HEAD: CPT

## 2025-03-08 PROCEDURE — 83735 ASSAY OF MAGNESIUM: CPT | Performed by: EMERGENCY MEDICINE

## 2025-03-08 PROCEDURE — 83520 IMMUNOASSAY QUANT NOS NONAB: CPT | Performed by: EMERGENCY MEDICINE

## 2025-03-08 PROCEDURE — 93005 ELECTROCARDIOGRAM TRACING: CPT

## 2025-03-08 PROCEDURE — 99285 EMERGENCY DEPT VISIT HI MDM: CPT

## 2025-03-08 PROCEDURE — 82948 REAGENT STRIP/BLOOD GLUCOSE: CPT

## 2025-03-08 RX ORDER — PANTOPRAZOLE SODIUM 20 MG/1
20 TABLET, DELAYED RELEASE ORAL DAILY
Status: DISCONTINUED | OUTPATIENT
Start: 2025-03-09 | End: 2025-03-10 | Stop reason: HOSPADM

## 2025-03-08 RX ORDER — MIRTAZAPINE 15 MG/1
7.5 TABLET, FILM COATED ORAL
Status: DISCONTINUED | OUTPATIENT
Start: 2025-03-08 | End: 2025-03-10 | Stop reason: HOSPADM

## 2025-03-08 RX ORDER — METOPROLOL SUCCINATE 50 MG/1
50 TABLET, EXTENDED RELEASE ORAL DAILY
Status: DISCONTINUED | OUTPATIENT
Start: 2025-03-09 | End: 2025-03-10 | Stop reason: HOSPADM

## 2025-03-08 RX ORDER — QUETIAPINE FUMARATE 25 MG/1
25 TABLET, FILM COATED ORAL
Status: DISCONTINUED | OUTPATIENT
Start: 2025-03-08 | End: 2025-03-10 | Stop reason: HOSPADM

## 2025-03-08 RX ORDER — DONEPEZIL HYDROCHLORIDE 5 MG/1
10 TABLET, FILM COATED ORAL DAILY
Status: DISCONTINUED | OUTPATIENT
Start: 2025-03-09 | End: 2025-03-10 | Stop reason: HOSPADM

## 2025-03-08 RX ORDER — AMANTADINE HYDROCHLORIDE 100 MG/1
100 CAPSULE, GELATIN COATED ORAL 2 TIMES DAILY
Status: DISCONTINUED | OUTPATIENT
Start: 2025-03-08 | End: 2025-03-10 | Stop reason: HOSPADM

## 2025-03-08 RX ORDER — ENOXAPARIN SODIUM 100 MG/ML
40 INJECTION SUBCUTANEOUS DAILY
Status: DISCONTINUED | OUTPATIENT
Start: 2025-03-09 | End: 2025-03-10 | Stop reason: HOSPADM

## 2025-03-08 RX ORDER — CALCIUM CARBONATE 500 MG/1
1000 TABLET, CHEWABLE ORAL DAILY PRN
Status: DISCONTINUED | OUTPATIENT
Start: 2025-03-08 | End: 2025-03-10 | Stop reason: HOSPADM

## 2025-03-08 RX ORDER — TAMSULOSIN HYDROCHLORIDE 0.4 MG/1
0.4 CAPSULE ORAL
Status: DISCONTINUED | OUTPATIENT
Start: 2025-03-08 | End: 2025-03-10 | Stop reason: HOSPADM

## 2025-03-08 RX ORDER — POLYETHYLENE GLYCOL 3350 17 G/17G
17 POWDER, FOR SOLUTION ORAL DAILY PRN
Status: DISCONTINUED | OUTPATIENT
Start: 2025-03-08 | End: 2025-03-10

## 2025-03-08 RX ORDER — HYDROCHLOROTHIAZIDE 12.5 MG/1
12.5 CAPSULE ORAL DAILY
COMMUNITY
Start: 2025-03-03

## 2025-03-08 RX ORDER — ACETAMINOPHEN 325 MG/1
650 TABLET ORAL EVERY 6 HOURS PRN
Status: DISCONTINUED | OUTPATIENT
Start: 2025-03-08 | End: 2025-03-10 | Stop reason: HOSPADM

## 2025-03-08 RX ORDER — ASPIRIN 81 MG/1
81 TABLET, CHEWABLE ORAL DAILY
Status: DISCONTINUED | OUTPATIENT
Start: 2025-03-09 | End: 2025-03-10 | Stop reason: HOSPADM

## 2025-03-08 RX ORDER — PANTOPRAZOLE SODIUM 20 MG/1
20 TABLET, DELAYED RELEASE ORAL DAILY
COMMUNITY
Start: 2025-01-06

## 2025-03-08 RX ORDER — ATORVASTATIN CALCIUM 40 MG/1
40 TABLET, FILM COATED ORAL
Status: DISCONTINUED | OUTPATIENT
Start: 2025-03-09 | End: 2025-03-10 | Stop reason: HOSPADM

## 2025-03-08 RX ORDER — LEVETIRACETAM 500 MG/1
1500 TABLET ORAL EVERY 12 HOURS SCHEDULED
Status: DISCONTINUED | OUTPATIENT
Start: 2025-03-08 | End: 2025-03-10 | Stop reason: HOSPADM

## 2025-03-08 RX ORDER — INSULIN LISPRO 100 [IU]/ML
1-6 INJECTION, SOLUTION INTRAVENOUS; SUBCUTANEOUS
Status: DISCONTINUED | OUTPATIENT
Start: 2025-03-08 | End: 2025-03-10 | Stop reason: HOSPADM

## 2025-03-08 RX ORDER — HYDROCHLOROTHIAZIDE 12.5 MG/1
12.5 TABLET ORAL DAILY
Status: DISCONTINUED | OUTPATIENT
Start: 2025-03-09 | End: 2025-03-10 | Stop reason: HOSPADM

## 2025-03-08 RX ADMIN — IOHEXOL 85 ML: 350 INJECTION, SOLUTION INTRAVENOUS at 12:06

## 2025-03-08 RX ADMIN — SODIUM CHLORIDE 500 ML: 0.9 INJECTION, SOLUTION INTRAVENOUS at 11:18

## 2025-03-08 RX ADMIN — Medication 6 MG: at 20:41

## 2025-03-08 RX ADMIN — TAMSULOSIN HYDROCHLORIDE 0.4 MG: 0.4 CAPSULE ORAL at 17:30

## 2025-03-08 RX ADMIN — QUETIAPINE FUMARATE 25 MG: 25 TABLET ORAL at 20:41

## 2025-03-08 RX ADMIN — TICAGRELOR 90 MG: 90 TABLET ORAL at 20:41

## 2025-03-08 RX ADMIN — LEVETIRACETAM 1500 MG: 500 TABLET, FILM COATED ORAL at 20:41

## 2025-03-08 RX ADMIN — MIRTAZAPINE 7.5 MG: 15 TABLET, FILM COATED ORAL at 20:36

## 2025-03-08 RX ADMIN — AMANTADINE HYDROCHLORIDE 100 MG: 100 CAPSULE ORAL at 17:30

## 2025-03-08 NOTE — ASSESSMENT & PLAN NOTE
Patient with history of aphasia secondary to past strokes.  If needed, can consider SLP consultation and evaluation.

## 2025-03-08 NOTE — ASSESSMENT & PLAN NOTE
Per chart review, it appears that patient's SBP goal in the outpatient setting is < 130/80 mmHg.   Home Regimen:   Hydrochlorothiazide 12.5 mg, daily  Toprol-XL 50 mg, daily  Continue home regimen of antihypertensives.  Continue to monitor vital signs, per unit protocol.

## 2025-03-08 NOTE — ASSESSMENT & PLAN NOTE
Patient with history of bilateral MCA cerebrovascular accidents and s/p carotid stenting.  Continue aspirin and statin therapy.  Continue neurological checks, every 4 hours.

## 2025-03-08 NOTE — TELEPHONE ENCOUNTER
"Pt's wife, Faiza calling to make office aware that patient was transported to ED for a \"rage episode\".  Reports this is the second episode.    Requesting that neurologist call her to discuss plan of care.  Concerned that ED recommends long term care placement but this does not address the underlying cause of why rage is occurring in first place.    Please follow up with wife on Monday.  "

## 2025-03-08 NOTE — ASSESSMENT & PLAN NOTE
Per chart review, patient with history of bilateral carotid stenosis with bilateral MCA strokes.  Patient had right MCA thrombectomy in 2019 and left MCA thrombectomy/stenting in 2023.  Continue aspirin 81 mg, daily; Brilinta 90 mg, every 12 hours; and atorvastatin 40 mg, daily.  Continue adequate blood pressure control with SBP goal < 130/80 mmHg.  Per review, patient to follow-up for repeat VAS carotid duplex around August 2025.

## 2025-03-08 NOTE — ASSESSMENT & PLAN NOTE
"The patient presented to the hospital via EMS after wife called due to patient having episode of altered mental status.  The patient does have a significant history of CVA and seizures.  The patient's wife explaining symptoms of similar episodes of seizure-like activity per chart review.  Per wife, patient got up from bed and transitioned to chair.  However, in chair, patient became suddenly agitated and wife noting \"blank\" stare.  After, patient becoming extremely somnolent and likely postictal.      In December '24, patient noted to have AMS and was considered likely due to Keppra increase.  However, appears that AMS may be secondary to seizure.    CT head (3/8): \"No acute intracranial abnormality. Chronic microangiopathic changes. Chronic infarcts are noted.\"   CTA head/neck (3/8): \"CT Angiography: Status post bilateral carotid stent placement. Intimal hyperplasia noted without significant stenosis. High-grade stenosis versus occlusion distal M1 segment right middle cerebral artery unchanged from prior outside CT angiogram. Moderate multifocal stenosis left M2 and M3 branches.\"  Patient does not meet SIRS criteria.   UA negative   UC -pending  COVID/FLU negative   Will order neurology consultation.  Can consider EEG and MRI brain for further evaluation.   Will continue order for psychiatry evaluation.  Will order PT/OT consultation.  Will continue telemetry.  Will order orthostatic vital signs.  Will update ECHO to r/o cardiac causes.   Can consider outpatient gerontology follow-up on discharge.  "

## 2025-03-08 NOTE — ASSESSMENT & PLAN NOTE
Patient with evident mild tremor of the right hand. Per wife, this is new for the patient.   Will continue to monitor for worsening tremor.   Will await neurology consultation.

## 2025-03-08 NOTE — ED PROVIDER NOTES
Time reflects when diagnosis was documented in both MDM as applicable and the Disposition within this note       Time User Action Codes Description Comment    3/8/2025  2:19 PM Pineda Devi Add [R41.82] Altered mental status     3/8/2025  2:20 PM Pineda Devi Add [R55] Syncope     3/8/2025  2:27 PM Pineda Devi Add [E86.0] Dehydration           ED Disposition       ED Disposition   Admit    Condition   Stable    Date/Time   Sat Mar 8, 2025  2:27 PM    Comment   Case was discussed with Dr. Miranda and the patient's admission status was agreed to be Admission Status: inpatient status to the service of Dr. Miranda.               Assessment & Plan       Medical Decision Making  Obtain septic workup, CTA head/neck, chest x-ray, EKG  Give IV fluids and continue to monitor patient for any worsening symptoms  Plan for admission    CT scan shows some chronic findings.  No signs of infection noted on blood work.  At this time the etiology of patient's altered mental status as well as agitation and syncope is unclear.  Patient is admitted for further evaluation and management.  Patient and family agrees with admission plans.      Amount and/or Complexity of Data Reviewed  Labs: ordered.  Radiology: ordered. Decision-making details documented in ED Course.    Risk  Prescription drug management.  Decision regarding hospitalization.        ED Course as of 03/08/25 1540   Sat Mar 08, 2025   1401 CTA head and neck with and without contrast  IMPRESSION:        CT Angiography: Status post bilateral carotid stent placement. Intimal hyperplasia noted without significant stenosis.     High-grade stenosis versus occlusion distal M1 segment right middle cerebral artery unchanged from prior outside CT angiogram. Moderate multifocal stenosis left M2 and M3 branches.     1401 CT head without contrast  IMPRESSION:     No acute intracranial abnormality. Chronic microangiopathic changes. Chronic infarcts are noted.         Medications    sodium chloride 0.9 % bolus 500 mL (500 mL Intravenous New Bag 3/8/25 1118)   iohexol (OMNIPAQUE) 350 MG/ML injection (MULTI-DOSE) 85 mL (85 mL Intravenous Given 3/8/25 1206)       ED Risk Strat Scores                                                History of Present Illness       Chief Complaint   Patient presents with    Altered Mental Status     wife called EMS for altered mental status/angry outburst at home.EMS reports the patient has been going through an outpatient workup for dementia that has been negative so far. At time of assessment patient is only oriented to his name, unable to provide his date of birth. Patient reports this is normal since his stroke, but is unclear on when the stroke was.  Patient calm and cooperative at time of triage. Patient does not remember what happened this morning, reports everything just went black.         Past Medical History:   Diagnosis Date    CTS (carpal tunnel syndrome) 4/2003    Depression     Diabetes mellitus (HCC)     patient denies    Dyslipidemia 03/26/2019    GERD (gastroesophageal reflux disease)     HL (hearing loss) 1/2005    Hyperlipidemia     Hypertension     Memory loss 3/2019    Prediabetes     Prediabetes 04/03/2019    Stroke (HCC)       Past Surgical History:   Procedure Laterality Date    ARTHROSCOPIC REPAIR ACL Left     BACK SURGERY      twice    CARPAL TUNNEL RELEASE Right     IR CEREBRAL ANGIOGRAPHY  05/04/2023    IR STROKE ALERT  03/19/2019    SHOULDER SURGERY Left     US GUIDED THYROID BIOPSY  11/21/2023      Family History   Problem Relation Age of Onset    Hyperlipidemia Mother     Hypertension Mother     Diabetes unspecified Mother         prediabetes    Heart attack Mother     Diabetes Mother     Hyperlipidemia Father     Hypertension Father     Prostate cancer Father     Stroke Father     Hyperlipidemia Sister     Hypertension Sister     Hyperlipidemia Sister     Hypertension Sister     Depression Sister     Hypertension Sister      Hyperlipidemia Sister     Depression Sister     Hyperlipidemia Brother     Hypertension Brother     Hypertension Brother     Prostate cancer Brother     Hypothyroidism Daughter     Hypothyroidism Son     Diabetes unspecified Son     Seizures Neg Hx       Social History     Tobacco Use    Smoking status: Former     Passive exposure: Past    Smokeless tobacco: Never   Vaping Use    Vaping status: Never Used   Substance Use Topics    Alcohol use: Never    Drug use: Never      E-Cigarette/Vaping    E-Cigarette Use Never User       E-Cigarette/Vaping Substances    Nicotine No     THC No     CBD No     Flavoring No     Other No     Unknown No       I have reviewed and agree with the history as documented.     65-year-old male with past history of diabetes, multiple strokes with memory difficulty, depression, hyperlipidemia, prediabetes, hearing loss, presents to the ED for evaluation of altered mental status and agitation.  Patient stays home with wife.  He has a caregiver that comes every Monday, Wednesday, Friday.  Today wife was home alone with patient.  Wife got patient up and had breakfast.  Patient did well.  Patient was then sitting around and started to become extremely agitated and physical.  Wife tried to calm him down and then patient became unresponsive for about 10 minutes.  When patient regained consciousness, he had no recollection of what happened.  Patient has history of watershed infarcts in the past causing his memory deficit.  Patient is on Aricept as a result of that.  Patient does not have any diagnosis of dementia.  Patient was admitted on 12/24/2025 for altered mental status.  At that time patient was agitated again but had longer amount of LOC that lasted for over 20 minutes.  Patient is followed by neurology as well as neurosurgery.  At this time wife is concerned about patient's recurrent episodes of syncope and agitation.  In the emergency department patient is alert and oriented to self.   Patient cannot recall what happened earlier today.      History provided by:  Spouse and EMS personnel  History limited by:  Mental status change   used: No    Altered Mental Status      Review of Systems   Unable to perform ROS: Mental status change           Objective       ED Triage Vitals [03/08/25 1104]   Temperature Pulse Blood Pressure Respirations SpO2 Patient Position - Orthostatic VS   97.8 °F (36.6 °C) 71 126/80 18 99 % Sitting      Temp Source Heart Rate Source BP Location FiO2 (%) Pain Score    Oral Monitor Right arm -- No Pain      Vitals      Date and Time Temp Pulse SpO2 Resp BP Pain Score FACES Pain Rating User   03/08/25 1509 97.9 °F (36.6 °C) 79 98 % 17 134/75 -- -- DII   03/08/25 1430 -- 75 99 % 19 147/64 -- -- MB   03/08/25 1403 -- 62 98 % 16 148/67 -- -- AP   03/08/25 1333 -- 64 99 % 16 132/61 -- -- AP   03/08/25 1305 -- 69 99 % 17 157/70 -- -- AP   03/08/25 1133 -- -- -- -- -- No Pain -- AP   03/08/25 1130 -- 74 98 % 20 145/68 -- -- AP   03/08/25 1104 97.8 °F (36.6 °C) 71 99 % 18 126/80 No Pain -- AP            Physical Exam  Vitals and nursing note reviewed.   Constitutional:       General: He is not in acute distress.     Appearance: He is well-developed.   HENT:      Head: Normocephalic and atraumatic.   Eyes:      Conjunctiva/sclera: Conjunctivae normal.   Cardiovascular:      Rate and Rhythm: Normal rate and regular rhythm.      Heart sounds: No murmur heard.  Pulmonary:      Effort: Pulmonary effort is normal. No respiratory distress.      Breath sounds: Normal breath sounds.   Abdominal:      Palpations: Abdomen is soft.      Tenderness: There is no abdominal tenderness.   Musculoskeletal:         General: No swelling.      Cervical back: Neck supple.   Skin:     General: Skin is warm and dry.      Capillary Refill: Capillary refill takes less than 2 seconds.   Neurological:      Mental Status: He is alert.      Comments: Patient is alert and oriented to self  only.  Generalized weakness noted without any obvious focal neurodeficits.   Psychiatric:         Mood and Affect: Mood normal.         Results Reviewed       Procedure Component Value Units Date/Time    HS Troponin I 2hr [[id number]]  (Normal) Collected: 03/08/25 1318    Lab Status: Final result Specimen: Blood from Arm, Right Updated: 03/08/25 1424     hs TnI 2hr 8 ng/L      Delta 2hr hsTnI -1 ng/L     Magnesium [[id number]]  (Normal) Collected: 03/08/25 1107    Lab Status: Final result Specimen: Blood from Arm, Right Updated: 03/08/25 1246     Magnesium 2.2 mg/dL     FLU/RSV/COVID - if FLU/RSV clinically relevant (2hr TAT) [[id number]]  (Normal) Collected: 03/08/25 1120    Lab Status: Final result Specimen: Nares from Nose Updated: 03/08/25 1236     SARS-CoV-2 Negative     INFLUENZA A PCR Negative     INFLUENZA B PCR Negative     RSV PCR Negative    Narrative:      This test has been performed using the CoV-2/Flu/RSV plus assay on the [employer] GeneXpert platform. This test has been validated by the  and verified by the performing laboratory.     This test is designed to amplify and detect the following: nucleocapsid (N), envelope (E), and RNA-dependent RNA polymerase (RdRP) genes of the SARS-CoV-2 genome; matrix (M), basic polymerase (PB2), and acidic protein (PA) segments of the influenza A genome; matrix (M) and non-structural protein (NS) segments of the influenza B genome, and the nucleocapsid genes of RSV A and RSV B.     Positive results are indicative of the presence of Flu A, Flu B, RSV, and/or SARS-CoV-2 RNA. Positive results for SARS-CoV-2 or suspected novel influenza should be reported to state, local, or federal health departments according to local reporting requirements.      All results should be assessed in conjunction with clinical presentation and other laboratory markers for clinical management.     FOR PEDIATRIC PATIENTS - copy/paste COVID Guidelines URL to browser:  [email or web address]       Urine Microscopic [[id number]]  (Normal) Collected: 03/08/25 1129    Lab Status: Final result Specimen: Urine, Straight Cath Updated: 03/08/25 1201     RBC, UA None Seen /hpf      WBC, UA 0-1 /hpf      Epithelial Cells Occasional /hpf      Bacteria, UA Occasional /hpf     UA (URINE) with reflex to Scope [[id number]]  (Abnormal) Collected: 03/08/25 1129    Lab Status: Final result Specimen: Urine, Straight Cath Updated: 03/08/25 1200     Color, UA Yellow     Clarity, UA Clear     Specific Gravity, UA 1.020     pH, UA 6.5     Leukocytes, UA Negative     Nitrite, UA Negative     Protein, UA Trace mg/dl      Glucose, UA 70 (7/100%) mg/dl      Ketones, UA Negative mg/dl      Urobilinogen, UA <2.0 mg/dl      Bilirubin, UA Negative     Occult Blood, UA Negative    HS Troponin 0hr (reflex protocol) [[id number]]  (Normal) Collected: 03/08/25 1107    Lab Status: Final result Specimen: Blood from Arm, Right Updated: 03/08/25 1137     hs TnI 0hr 9 ng/L     Comprehensive metabolic panel [[id number]]  (Abnormal) Collected: 03/08/25 1107    Lab Status: Final result Specimen: Blood from Arm, Right Updated: 03/08/25 1134     Sodium 139 mmol/L      Potassium 3.9 mmol/L      Chloride 103 mmol/L      CO2 30 mmol/L      ANION GAP 6 mmol/L      BUN 28 mg/dL      Creatinine 1.09 mg/dL      Glucose 98 mg/dL      Calcium 9.5 mg/dL      AST 20 U/L      ALT 26 U/L      Alkaline Phosphatase 124 U/L      Total Protein 7.1 g/dL      Albumin 4.3 g/dL      Total Bilirubin 0.44 mg/dL      eGFR 70 ml/min/1.73sq m     Narrative:      National Kid[employer] guidelines for Chronic Kidney Disease (CKD):     Stage 1 with normal or high GFR (GFR > 90 mL/min/1.73 square meters)    Stage 2 Mild CKD (GFR = 60-89 mL/min/1.73 square meters)    Stage 3A Moderate CKD (GFR = 45-59 mL/min/1.73 square meters)    Stage 3B Moderate CKD (GFR = 30-44 mL/min/1.73 square meters)     Stage 4 Severe CKD (GFR = 15-29 mL/min/1.73 square meters)    Stage 5 End Stage CKD (GFR <15 mL/min/1.73 square meters)  Note: GFR calculation is accurate only with a steady state creatinine    Levetiracetam level [[id number]] Collected: 03/08/25 1118    Lab Status: In process Specimen: Blood from Arm, Right Updated: 03/08/25 1133    Urine culture [[id number]] Collected: 03/08/25 1128    Lab Status: In process Specimen: Urine, Other Updated: 03/08/25 1133    CBC and differential [[id number]] Collected: 03/08/25 1107    Lab Status: Final result Specimen: Blood from Arm, Right Updated: 03/08/25 1114     WBC 7.91 Thousand/uL      RBC 5.12 Million/uL      Hemoglobin 15.2 g/dL      Hematocrit 47.6 %      MCV 93 fL      MCH 29.7 pg      MCHC 31.9 g/dL      RDW 13.8 %      MPV 10.0 fL      Platelets 245 Thousands/uL      nRBC 0 /100 WBCs      Segmented % 63 %      Immature Grans % 0 %      Lymphocytes % 24 %      Monocytes % 9 %      Eosinophils Relative 3 %      Basophils Relative 1 %      Absolute Neutrophils 4.94 Thousands/µL      Absolute Immature Grans 0.02 Thousand/uL      Absolute Lymphocytes 1.86 Thousands/µL      Absolute Monocytes 0.73 Thousand/µL      Eosinophils Absolute 0.27 Thousand/µL      Basophils Absolute 0.09 Thousands/µL             CTA head and neck with and without contrast   Final Interpretation by Robert Rizzo DO (03/08 1320)         CT Angiography: Status post bilateral carotid stent placement. Intimal hyperplasia noted without significant stenosis.      High-grade stenosis versus occlusion distal M1 segment right middle cerebral artery unchanged from prior outside CT angiogram. Moderate multifocal stenosis left M2 and M3 branches.                     Workstation performed: [id number]         CT head without contrast   Final Interpretation by Robert Rizzo DO (03/08 1321)      No acute intracranial abnormality. Chronic microangiopathic changes. Chronic infarcts are noted.                   Workstation performed: [id number]         XR chest 1 view portable   Final Interpretation by Mae Franco MD (03/08 1431)      No acute cardiopulmonary disease.            Workstation performed: [id number]             ECG 12 Lead Documentation Only    Date/Time: 3/8/2025 11:00 AM    Performed by: Pineda Devi DO  Authorized by: Pineda Devi DO    Indications / Diagnosis:  Weakness  ECG reviewed by me, the ED Provider: yes    Patient location:  ED  Previous ECG:     Previous ECG:  Compared to current    Similarity:  No change    Comparison to cardiac monitor: Yes    Interpretation:     Interpretation: abnormal    Comments:      Sinus rhythm, rate 71, normal axis, normal intervals, no acute ST elevations noted, no amplitude T waves noted throughout suggesting nonspecific T wave abnormality that is unchanged from previous study, Q waves noted in lead III and aVF suggesting inferior infarct of undetermined age that is also unchanged from previous study.      ED Medication and Procedure Management   Prior to Admission Medications   Prescriptions Last Dose Informant Patient Reported? Taking?   Amantadine HCl 100 MG tablet 3/8/2025 Spouse/Significant Other Yes Yes   Sig: Take 100 mg by mouth 2 (two) times a day   Baclofen 5 MG TABS 3/8/2025 Spouse/Significant Other No Yes   Sig: Take 5 mg by mouth 2 (two) times a day as needed (spasm)   QUEtiapine (SEROquel) 25 mg tablet 3/7/2025 Spouse/Significant Other No Yes   Sig: Take 1 tablet (25 mg total) by mouth daily at bedtime   acetaminophen (TYLENOL) 500 mg tablet 3/8/2025 Spouse/Significant Other Yes Yes   Sig: Take 500 mg by mouth as needed for mild pain   aspirin 81 mg chewable tablet 3/8/2025 Spouse/Significant Other Yes Yes   Sig: Chew 81 mg daily   atorvastatin (LIPITOR) 40 mg tablet 3/7/2025 Spouse/Significant Other Yes Yes   Sig: Take 40 mg by mouth daily with breakfast   donepezil (ARICEPT) 10 mg tablet 3/8/2025 Spouse/Significant Other No Yes   Sig:  Take 1 tablet (10 mg total) by mouth in the morning   hydroCHLOROthiazide 25 mg tablet 3/8/2025 Spouse/Significant Other No Yes   Sig: Take 1 tablet (25 mg total) by mouth daily   Patient taking differently: Take 12.5 mg by mouth daily   levETIRAcetam (Keppra) 750 mg tablet 3/8/2025 Spouse/Significant Other No Yes   Sig: Take 2 tablets (1,500 mg total) by mouth every 12 (twelve) hours   Patient taking differently: Take 500 mg by mouth every 12 (twelve) hours 3 tablets in the AM 3 tablets in PM for a total of 3000 mg   melatonin 1 mg 3/7/2025 Spouse/Significant Other Yes Yes   Sig: Take 5 mg by mouth daily at bedtime   metoprolol succinate (TOPROL-XL) 50 mg 24 hr tablet 3/8/2025 Spouse/Significant Other Yes Yes   Sig: Take 50 mg by mouth daily   mirtazapine (REMERON) 7.5 MG tablet 3/7/2025 Spouse/Significant Other Yes Yes   Sig: Take 7.5 mg by mouth daily at bedtime   pantoprazole (PROTONIX) 40 mg tablet 3/8/2025 Spouse/Significant Other No Yes   Sig: Take 1 tablet (40 mg total) by mouth daily   Patient taking differently: Take 20 mg by mouth daily   tamsulosin (FLOMAX) 0.4 mg 3/7/2025 Spouse/Significant Other No Yes   Sig: Take 1 capsule (0.4 mg total) by mouth daily with dinner   ticagrelor (BRILINTA) 90 MG 3/8/2025 Spouse/Significant Other No Yes   Sig: Take 1 tablet (90 mg total) by mouth every 12 (twelve) hours      Facility-Administered Medications: None     Current Discharge Medication List        CONTINUE these medications which have NOT CHANGED    Details   acetaminophen (TYLENOL) 500 mg tablet Take 500 mg by mouth as needed for mild pain      Amantadine HCl 100 MG tablet Take 100 mg by mouth 2 (two) times a day      aspirin 81 mg chewable tablet Chew 81 mg daily      atorvastatin (LIPITOR) 40 mg tablet Take 40 mg by mouth daily with breakfast      Baclofen 5 MG TABS Take 5 mg by mouth 2 (two) times a day as needed (spasm)    Associated Diagnoses: Muscle spasms of both lower extremities      donepezil  (ARICEPT) 10 mg tablet Take 1 tablet (10 mg total) by mouth in the morning  Qty: 60 tablet, Refills: 2    Associated Diagnoses: Memory loss      hydroCHLOROthiazide 25 mg tablet Take 1 tablet (25 mg total) by mouth daily    Associated Diagnoses: Closed nondisplaced fracture of distal phalanx of right great toe, initial encounter; Primary hypertension      levETIRAcetam (Keppra) 750 mg tablet Take 2 tablets (1,500 mg total) by mouth every 12 (twelve) hours    Associated Diagnoses: Altered mental status      melatonin 1 mg Take 5 mg by mouth daily at bedtime      metoprolol succinate (TOPROL-XL) 50 mg 24 hr tablet Take 50 mg by mouth daily      mirtazapine (REMERON) 7.5 MG tablet Take 7.5 mg by mouth daily at bedtime      pantoprazole (PROTONIX) 40 mg tablet Take 1 tablet (40 mg total) by mouth daily  Qty: 90 tablet, Refills: 0    Associated Diagnoses: GERD (gastroesophageal reflux disease)      QUEtiapine (SEROquel) 25 mg tablet Take 1 tablet (25 mg total) by mouth daily at bedtime  Qty: 30 tablet, Refills: 0    Associated Diagnoses: Altered mental status      tamsulosin (FLOMAX) 0.4 mg Take 1 capsule (0.4 mg total) by mouth daily with dinner  Qty: 90 capsule, Refills: 3    Associated Diagnoses: Urinary retention      ticagrelor (BRILINTA) 90 MG Take 1 tablet (90 mg total) by mouth every 12 (twelve) hours  Qty: 60 tablet, Refills: 3    Associated Diagnoses: Carotid stenosis, bilateral           No discharge procedures on file.  ED SEPSIS DOCUMENTATION   Time reflects when diagnosis was documented in both MDM as applicable and the Disposition within this note       Time User Action Codes Description Comment    3/8/2025  2:19 PM Pineda Devi [R41.82] Altered mental status     3/8/2025  2:20 PM Pineda Devi Add [R55] Syncope     3/8/2025  2:27 PM Pineda Devi [E86.0] Dehydration                  Pineda Devi, DO  03/08/25 1547

## 2025-03-08 NOTE — PLAN OF CARE
Problem: PAIN - ADULT  Goal: Verbalizes/displays adequate comfort level or baseline comfort level  Description: Interventions:  - Encourage patient to monitor pain and request assistance  - Assess pain using appropriate pain scale  - Administer analgesics based on type and severity of pain and evaluate response  - Implement non-pharmacological measures as appropriate and evaluate response  - Consider cultural and social influences on pain and pain management  - Notify physician/advanced practitioner if interventions unsuccessful or patient reports new pain  Outcome: Progressing     Problem: INFECTION - ADULT  Goal: Absence or prevention of progression during hospitalization  Description: INTERVENTIONS:  - Assess and monitor for signs and symptoms of infection  - Monitor lab/diagnostic results  - Monitor all insertion sites, i.e. indwelling lines, tubes, and drains  - Monitor endotracheal if appropriate and nasal secretions for changes in amount and color  - [place] appropriate cooling/warming therapies per order  - Administer medications as ordered  - Instruct and encourage patient and family to use good hand hygiene technique  - Identify and instruct in appropriate isolation precautions for identified infection/condition  Outcome: Progressing

## 2025-03-09 LAB
ALBUMIN SERPL BCG-MCNC: 4.1 G/DL (ref 3.5–5)
ALP SERPL-CCNC: 112 U/L (ref 34–104)
ALT SERPL W P-5'-P-CCNC: 23 U/L (ref 7–52)
ANION GAP SERPL CALCULATED.3IONS-SCNC: 8 MMOL/L (ref 4–13)
AST SERPL W P-5'-P-CCNC: 18 U/L (ref 13–39)
BACTERIA UR CULT: NORMAL
BILIRUB SERPL-MCNC: 0.67 MG/DL (ref 0.2–1)
BUN SERPL-MCNC: 24 MG/DL (ref 5–25)
CALCIUM SERPL-MCNC: 9.1 MG/DL (ref 8.4–10.2)
CHLORIDE SERPL-SCNC: 104 MMOL/L (ref 96–108)
CK SERPL-CCNC: 50 U/L (ref 39–308)
CO2 SERPL-SCNC: 27 MMOL/L (ref 21–32)
CREAT SERPL-MCNC: 0.98 MG/DL (ref 0.6–1.3)
ERYTHROCYTE [DISTWIDTH] IN BLOOD BY AUTOMATED COUNT: 14.3 % (ref 11.6–15.1)
GFR SERPL CREATININE-BSD FRML MDRD: 80 ML/MIN/1.73SQ M
GLUCOSE SERPL-MCNC: 101 MG/DL (ref 65–140)
GLUCOSE SERPL-MCNC: 121 MG/DL (ref 65–140)
GLUCOSE SERPL-MCNC: 145 MG/DL (ref 65–140)
GLUCOSE SERPL-MCNC: 94 MG/DL (ref 65–140)
GLUCOSE SERPL-MCNC: 94 MG/DL (ref 65–140)
HCT VFR BLD AUTO: 46.4 % (ref 36.5–49.3)
HGB BLD-MCNC: 14.9 G/DL (ref 12–17)
LEVETIRACETAM SERPL-MCNC: 37.1 UG/ML (ref 12–46)
MAGNESIUM SERPL-MCNC: 2.1 MG/DL (ref 1.9–2.7)
MCH RBC QN AUTO: 29.8 PG (ref 26.8–34.3)
MCHC RBC AUTO-ENTMCNC: 32.1 G/DL (ref 31.4–37.4)
MCV RBC AUTO: 93 FL (ref 82–98)
PLATELET # BLD AUTO: 244 THOUSANDS/UL (ref 149–390)
PMV BLD AUTO: 10 FL (ref 8.9–12.7)
POTASSIUM SERPL-SCNC: 3.8 MMOL/L (ref 3.5–5.3)
PROT SERPL-MCNC: 7 G/DL (ref 6.4–8.4)
RBC # BLD AUTO: 5 MILLION/UL (ref 3.88–5.62)
SODIUM SERPL-SCNC: 139 MMOL/L (ref 135–147)
WBC # BLD AUTO: 10.04 THOUSAND/UL (ref 4.31–10.16)

## 2025-03-09 PROCEDURE — 99223 1ST HOSP IP/OBS HIGH 75: CPT | Performed by: PSYCHIATRY & NEUROLOGY

## 2025-03-09 PROCEDURE — 83520 IMMUNOASSAY QUANT NOS NONAB: CPT

## 2025-03-09 PROCEDURE — 82550 ASSAY OF CK (CPK): CPT | Performed by: INTERNAL MEDICINE

## 2025-03-09 PROCEDURE — 83735 ASSAY OF MAGNESIUM: CPT | Performed by: INTERNAL MEDICINE

## 2025-03-09 PROCEDURE — 82948 REAGENT STRIP/BLOOD GLUCOSE: CPT

## 2025-03-09 PROCEDURE — 99232 SBSQ HOSP IP/OBS MODERATE 35: CPT | Performed by: INTERNAL MEDICINE

## 2025-03-09 PROCEDURE — 85027 COMPLETE CBC AUTOMATED: CPT | Performed by: INTERNAL MEDICINE

## 2025-03-09 PROCEDURE — 80053 COMPREHEN METABOLIC PANEL: CPT | Performed by: INTERNAL MEDICINE

## 2025-03-09 RX ORDER — OXCARBAZEPINE 300 MG/1
600 TABLET, FILM COATED ORAL ONCE
Status: COMPLETED | OUTPATIENT
Start: 2025-03-09 | End: 2025-03-09

## 2025-03-09 RX ORDER — OXCARBAZEPINE 300 MG/1
300 TABLET, FILM COATED ORAL EVERY 12 HOURS SCHEDULED
Status: DISCONTINUED | OUTPATIENT
Start: 2025-03-10 | End: 2025-03-10 | Stop reason: HOSPADM

## 2025-03-09 RX ADMIN — ENOXAPARIN SODIUM 40 MG: 40 INJECTION SUBCUTANEOUS at 09:59

## 2025-03-09 RX ADMIN — TICAGRELOR 90 MG: 90 TABLET ORAL at 09:59

## 2025-03-09 RX ADMIN — AMANTADINE HYDROCHLORIDE 100 MG: 100 CAPSULE ORAL at 09:59

## 2025-03-09 RX ADMIN — LEVETIRACETAM 1500 MG: 500 TABLET, FILM COATED ORAL at 09:59

## 2025-03-09 RX ADMIN — LEVETIRACETAM 1500 MG: 500 TABLET, FILM COATED ORAL at 20:55

## 2025-03-09 RX ADMIN — MIRTAZAPINE 7.5 MG: 15 TABLET, FILM COATED ORAL at 20:54

## 2025-03-09 RX ADMIN — ASPIRIN 81 MG: 81 TABLET, CHEWABLE ORAL at 10:00

## 2025-03-09 RX ADMIN — DONEPEZIL HYDROCHLORIDE 10 MG: 5 TABLET ORAL at 09:59

## 2025-03-09 RX ADMIN — OXCARBAZEPINE 600 MG: 300 TABLET, FILM COATED ORAL at 17:49

## 2025-03-09 RX ADMIN — ATORVASTATIN CALCIUM 40 MG: 40 TABLET, FILM COATED ORAL at 09:59

## 2025-03-09 RX ADMIN — QUETIAPINE FUMARATE 25 MG: 25 TABLET ORAL at 20:54

## 2025-03-09 RX ADMIN — TICAGRELOR 90 MG: 90 TABLET ORAL at 20:54

## 2025-03-09 RX ADMIN — Medication 6 MG: at 20:55

## 2025-03-09 RX ADMIN — PANTOPRAZOLE SODIUM 20 MG: 20 TABLET, DELAYED RELEASE ORAL at 10:00

## 2025-03-09 RX ADMIN — AMANTADINE HYDROCHLORIDE 100 MG: 100 CAPSULE ORAL at 17:05

## 2025-03-09 RX ADMIN — TAMSULOSIN HYDROCHLORIDE 0.4 MG: 0.4 CAPSULE ORAL at 17:05

## 2025-03-09 RX ADMIN — HYDROCHLOROTHIAZIDE 12.5 MG: 12.5 TABLET ORAL at 09:59

## 2025-03-09 RX ADMIN — METOPROLOL SUCCINATE 50 MG: 50 TABLET, EXTENDED RELEASE ORAL at 10:00

## 2025-03-09 NOTE — PLAN OF CARE
Problem: PAIN - ADULT  Goal: Verbalizes/displays adequate comfort level or baseline comfort level  Description: Interventions:  - Encourage patient to monitor pain and request assistance  - Assess pain using appropriate pain scale  - Administer analgesics based on type and severity of pain and evaluate response  - Implement non-pharmacological measures as appropriate and evaluate response  - Consider cultural and social influences on pain and pain management  - Notify physician/advanced practitioner if interventions unsuccessful or patient reports new pain  Outcome: Progressing     Problem: INFECTION - ADULT  Goal: Absence or prevention of progression during hospitalization  Description: INTERVENTIONS:  - Assess and monitor for signs and symptoms of infection  - Monitor lab/diagnostic results  - Monitor all insertion sites, i.e. indwelling lines, tubes, and drains  - Monitor endotracheal if appropriate and nasal secretions for changes in amount and color  - [place] appropriate cooling/warming therapies per order  - Administer medications as ordered  - Instruct and encourage patient and family to use good hand hygiene technique  - Identify and instruct in appropriate isolation precautions for identified infection/condition  Outcome: Progressing  Goal: Absence of fever/infection during neutropenic period  Description: INTERVENTIONS:  - Monitor WBC    Outcome: Progressing     Problem: SAFETY ADULT  Goal: Patient will remain free of falls  Description: INTERVENTIONS:  - Educate patient/family on patient safety including physical limitations  - Instruct patient to call for assistance with activity   - Consult OT/PT to assist with strengthening/mobility   - Keep Call bell within reach  - Keep bed low and locked with side rails adjusted as appropriate  - Keep care items and personal belongings within reach  - Initiate and maintain comfort rounds  - Make Fall Risk Sign visible to staff  - Offer Toileting every 2 Hours,  in advance of need  - Initiate/Maintain 2alarm  - Obtain necessary fall risk management equipment: 2  - Apply yellow socks and bracelet for high fall risk patients  - Consider moving patient to room near nurses station  Outcome: Progressing  Goal: Maintain or return to baseline ADL function  Description: INTERVENTIONS:  -  Assess patient's ability to carry out ADLs; assess patient's baseline for ADL function and identify physical deficits which impact ability to perform ADLs (bathing, care of mouth/teeth, toileting, grooming, dressing, etc.)  - Assess/evaluate cause of self-care deficits   - Assess range of motion  - Assess patient's mobility; develop plan if impaired  - Assess patient's need for assistive devices and provide as appropriate  - Encourage maximum independence but intervene and supervise when necessary  - Involve family in performance of ADLs  - Assess for home care needs following discharge   - Consider OT consult to assist with ADL evaluation and planning for discharge  - Provide patient education as appropriate  Outcome: Progressing  Goal: Maintains/Returns to pre admission functional level  Description: INTERVENTIONS:  - Perform AM-PAC 6 Click Basic Mobility/ Daily Activity assessment daily.  - Set and communicate daily mobility goal to care team and patient/family/caregiver.   - Collaborate with rehabilitation services on mobility goals if consulted  - Perform Range of Motion 2 times a day.  - Reposition patient every 2 hours.  - Dangle patient 2 times a day  - Stand patient 2 times a day  - Ambulate patient 2 times a day  - Out of bed to chair 2 times a day   - Out of bed for meals 2 times a day  - Out of bed for toileting  - Record patient progress and toleration of activity level   Outcome: Progressing     Problem: DISCHARGE PLANNING  Goal: Discharge to home or other facility with appropriate resources  Description: INTERVENTIONS:  - Identify barriers to discharge w/patient and caregiver  -  Arrange for needed discharge resources and transportation as appropriate  - Identify discharge learning needs (meds, wound care, etc.)  - Arrange for interpretive services to assist at discharge as needed  - Refer to Case Management Department for coordinating discharge planning if the patient needs post-hospital services based on physician/advanced practitioner order or complex needs related to functional status, cognitive ability, or social support system  Outcome: Progressing     Problem: Knowledge Deficit  Goal: Patient/family/caregiver demonstrates understanding of disease process, treatment plan, medications, and discharge instructions  Description: Complete learning assessment and assess knowledge base.  Interventions:  - Provide teaching at level of understanding  - Provide teaching via preferred learning methods  Outcome: Progressing     Problem: Prexisting or High Potential for Compromised Skin Integrity  Goal: Skin integrity is maintained or improved  Description: INTERVENTIONS:  - Identify patients at risk for skin breakdown  - Assess and monitor skin integrity  - Assess and monitor nutrition and hydration status  - Monitor labs   - Assess for incontinence   - Turn and reposition patient  - Assist with mobility/ambulation  - Relieve pressure over bony prominences  - Avoid friction and shearing  - Provide appropriate hygiene as needed including keeping skin clean and dry  - Evaluate need for skin moisturizer/barrier cream  - Collaborate with interdisciplinary team   - Patient/family teaching  - Consider wound care consult   Outcome: Progressing

## 2025-03-09 NOTE — PROGRESS NOTES
"Progress Note - Hospitalist   Name: Constanza Zhu 65 y.o. male I MRN: [id number]  Unit/Bed#: -01 I Date of Admission: 3/8/2025   Date of Service: 3/9/2025 I Hospital Day: 1    Assessment & Plan  Altered mental status  The patient presented to the hospital via EMS after wife called due to patient having episode of altered mental status.  The patient does have a significant history of CVA and seizures.  The patient's wife explaining symptoms of similar episodes of seizure-like activity per chart review.  Per wife, patient got up from bed and transitioned to chair.  However, in chair, patient became suddenly agitated and wife noting \"blank\" stare.  After, patient becoming extremely somnolent and likely postictal.      In December '24, patient noted to have AMS and was considered likely due to Keppra increase.  However, appears that AMS may be secondary to seizure.    CT head (3/8): \"No acute intracranial abnormality. Chronic microangiopathic changes. Chronic infarcts are noted.\"   CTA head/neck (3/8): \"CT Angiography: Status post bilateral carotid stent placement. Intimal hyperplasia noted without significant stenosis. High-grade stenosis versus occlusion distal M1 segment right middle cerebral artery unchanged from prior outside CT angiogram. Moderate multifocal stenosis left M2 and M3 branches.\"  CK - normal  Keppra levels- 62.6-->37     Patient does not meet SIRS criteria.   UA negative   COVID/FLU negative   Neurology evaluation  Can consider EEG and MRI brain for further evaluation.   Seizure precautions    Can consider outpatient gerontology follow-up on discharge.  History of CVA (cerebrovascular accident)  Patient with history of bilateral MCA cerebrovascular accidents and s/p carotid stenting.  Continue aspirin and statin therapy.    Seizures (HCC)  Patient with history of seizures, but per wife patient has not had a seizure in over a year.  However, per chart review patient with \"rigid\" like activity " on last admission in December '24, where this was thought to be a side effect of dystonia.  Home Regimen: Keppra 1500 mg, twice daily.  Will continue home regimen for now    Will await formal neurology consultation and evaluation.  Continue seizure precautions.  Would encourage outpatient follow-up with epilepsy team.  Tremor of right hand  Patient with evident mild tremor of the right hand. Per wife, this is new for the patient.   Will continue to monitor for worsening tremor.   Will await neurology consultation.   Carotid stenosis, bilateral  Per chart review, patient with history of bilateral carotid stenosis with bilateral MCA strokes.  Patient had right MCA thrombectomy in 2019 and left MCA thrombectomy/stenting in 2023.  Continue aspirin 81 mg, daily; Brilinta 90 mg, every 12 hours; and atorvastatin 40 mg, daily.  Continue adequate blood pressure control with SBP goal < 130/80 mmHg.  Per review, patient to follow-up for repeat VAS carotid duplex around August 2025.  Hypertension  Per chart review, it appears that patient's SBP goal in the outpatient setting is < 130/80 mmHg.   Home Regimen:   Hydrochlorothiazide 12.5 mg, daily  Toprol-XL 50 mg, daily  Continue home regimen of antihypertensives.  Continue to monitor vital signs, per unit protocol.   GERD (gastroesophageal reflux disease)  Continue pantoprazole 40 mg, daily.    VTE Pharmacologic Prophylaxis: VTE Score: 3 Moderate Risk (Score 3-4) - Pharmacological DVT Prophylaxis Ordered: enoxaparin (Lovenox).    Mobility:   Basic Mobility Inpatient Raw Score: 18  JH-HLM Goal: 6: Walk 10 steps or more  JH-HLM Achieved: 6: Walk 10 steps or more  JH-HLM Goal achieved. Continue to encourage appropriate mobility.    Patient Centered Rounds: I performed bedside rounds with nursing staff today.   Discussions with Specialists or Other Care Team Provider: yes    Education and Discussions with Family / Patient: Updated  (wife) via phone.    Current Length  of Stay: 1 day(s)  Current Patient Status: Inpatient   Certification Statement: The patient will continue to require additional inpatient hospital stay due to pending eval  Discharge Plan: Anticipate discharge in 24-48 hrs to to be determined     Code Status: Level 1 - Full Code    Subjective   Examined at bedside  Comfortable on recliner  Denies any complaints      Objective :  Temp:  [98.2 °F (36.8 °C)-98.5 °F (36.9 °C)] 98.2 °F (36.8 °C)  HR:  [68-83] 71  BP: (126-156)/(64-78) 135/64  Resp:  [18] 18  SpO2:  [94 %-99 %] 98 %  O2 Device: None (Room air)    There is no height or weight on file to calculate BMI.     Input and Output Summary (last 24 hours):     Intake/Output Summary (Last 24 hours) at 3/9/2025 1543  Last data filed at 3/9/2025 1424  Gross per 24 hour   Intake 700 ml   Output 950 ml   Net -250 ml       Physical Exam  Vitals and nursing note reviewed.   Constitutional:       General: He is not in acute distress.     Appearance: He is well-developed.   HENT:      Head: Normocephalic and atraumatic.   Eyes:      Conjunctiva/sclera: Conjunctivae normal.   Cardiovascular:      Rate and Rhythm: Normal rate and regular rhythm.      Heart sounds: No murmur heard.  Pulmonary:      Effort: Pulmonary effort is normal. No respiratory distress.      Breath sounds: Normal breath sounds.   Abdominal:      Palpations: Abdomen is soft.      Tenderness: There is no abdominal tenderness.   Musculoskeletal:         General: No swelling.      Cervical back: Neck supple.   Skin:     General: Skin is warm and dry.      Capillary Refill: Capillary refill takes less than 2 seconds.   Neurological:      Mental Status: He is alert.      Motor: Weakness present.   Psychiatric:         Mood and Affect: Mood normal.           Lines/Drains:              Lab Results: I have reviewed the following results:   Results from last 7 days   Lab Units 03/09/25  0732 03/08/25  1107   WBC Thousand/uL 10.04 7.91   HEMOGLOBIN g/dL 14.9 15.2    HEMATOCRIT % 46.4 47.6   PLATELETS Thousands/uL 244 245   SEGS PCT %  --  63   LYMPHO PCT %  --  24   MONO PCT %  --  9   EOS PCT %  --  3     Results from last 7 days   Lab Units 03/09/25  0732   SODIUM mmol/L 139   POTASSIUM mmol/L 3.8   CHLORIDE mmol/L 104   CO2 mmol/L 27   BUN mg/dL 24   CREATININE mg/dL 0.98   ANION GAP mmol/L 8   CALCIUM mg/dL 9.1   ALBUMIN g/dL 4.1   TOTAL BILIRUBIN mg/dL 0.67   ALK PHOS U/L 112*   ALT U/L 23   AST U/L 18   GLUCOSE RANDOM mg/dL 94         Results from last 7 days   Lab Units 03/09/25  1118 03/09/25  0718 03/08/25 2010 03/08/25  1704   POC GLUCOSE mg/dl 94 101 112 82               Recent Cultures (last 7 days):   Results from last 7 days   Lab Units 03/08/25  1128   URINE CULTURE  No Growth <1000 cfu/mL       Imaging Results Review: I reviewed radiology reports from this admission including: CT head.  Other Study Results Review: EKG was reviewed.  EKG was personally reviewed and my interpretation is: NSR..    Last 24 Hours Medication List:     Current Facility-Administered Medications:     acetaminophen (TYLENOL) tablet 650 mg, Q6H PRN    amantadine (SYMMETREL) capsule 100 mg, BID    aspirin chewable tablet 81 mg, Daily    atorvastatin (LIPITOR) tablet 40 mg, Daily With Breakfast    calcium carbonate (TUMS) chewable tablet 1,000 mg, Daily PRN    donepezil (ARICEPT) tablet 10 mg, Daily    enoxaparin (LOVENOX) subcutaneous injection 40 mg, Daily    hydroCHLOROthiazide tablet 12.5 mg, Daily    insulin lispro (HumALOG/ADMELOG) 100 units/mL subcutaneous injection 1-6 Units, TID AC **AND** Fingerstick Glucose (POCT), TID AC    insulin lispro (HumALOG/ADMELOG) 100 units/mL subcutaneous injection 1-6 Units, HS    levETIRAcetam (KEPPRA) tablet 1,500 mg, Q12H JOSE    melatonin tablet 6 mg, HS    metoprolol succinate (TOPROL-XL) 24 hr tablet 50 mg, Daily    mirtazapine (REMERON) tablet 7.5 mg, HS    pantoprazole (PROTONIX) EC tablet 20 mg, Daily    polyethylene glycol (MIRALAX) packet  17 g, Daily PRN    QUEtiapine (SEROquel) tablet 25 mg, HS    tamsulosin (FLOMAX) capsule 0.4 mg, Daily With Dinner    ticagrelor (BRILINTA) tablet 90 mg, Q12H JOSE    Administrative Statements   Today, Patient Was Seen By: Keyanna Miranda MD  I have spent a total time of 39 minutes in caring for this patient on the day of the visit/encounter including Patient and family education, Counseling / Coordination of care, Documenting in the medical record, Reviewing/placing orders in the medical record (including tests, medications, and/or procedures), Obtaining or reviewing history  , and Communicating with other healthcare professionals .    **Please Note: This note may have been constructed using a voice recognition system.**

## 2025-03-09 NOTE — ASSESSMENT & PLAN NOTE
Patient with history of bilateral MCA cerebrovascular accidents and s/p carotid stenting.  Continue aspirin and statin therapy.

## 2025-03-09 NOTE — ASSESSMENT & PLAN NOTE
"Patient with history of seizures, but per wife patient has not had a seizure in over a year.  However, per chart review patient with \"rigid\" like activity on last admission in December '24, where this was thought to be a side effect of dystonia.  Home Regimen: Keppra 1500 mg, twice daily.  Will continue home regimen for now    Will await formal neurology consultation and evaluation.  Continue seizure precautions.  Would encourage outpatient follow-up with epilepsy team.  "

## 2025-03-09 NOTE — ASSESSMENT & PLAN NOTE
"The patient presented to the hospital via EMS after wife called due to patient having episode of altered mental status.  The patient does have a significant history of CVA and seizures.  The patient's wife explaining symptoms of similar episodes of seizure-like activity per chart review.  Per wife, patient got up from bed and transitioned to chair.  However, in chair, patient became suddenly agitated and wife noting \"blank\" stare.  After, patient becoming extremely somnolent and likely postictal.      In December '24, patient noted to have AMS and was considered likely due to Keppra increase.  However, appears that AMS may be secondary to seizure.    CT head (3/8): \"No acute intracranial abnormality. Chronic microangiopathic changes. Chronic infarcts are noted.\"   CTA head/neck (3/8): \"CT Angiography: Status post bilateral carotid stent placement. Intimal hyperplasia noted without significant stenosis. High-grade stenosis versus occlusion distal M1 segment right middle cerebral artery unchanged from prior outside CT angiogram. Moderate multifocal stenosis left M2 and M3 branches.\"  CK - normal  Keppra levels- 62.6-->37     Patient does not meet SIRS criteria.   UA negative   COVID/FLU negative   Neurology evaluation  Can consider EEG and MRI brain for further evaluation.   Seizure precautions    Can consider outpatient gerontology follow-up on discharge.  "

## 2025-03-09 NOTE — ASSESSMENT & PLAN NOTE
Suspect recurrent seizure.  - Discussed with patient regarding increasing Keppra further versus adding a second medication, patient preferred the latter.  - Recommend oxcarbazepine 600 mg orally x 1 followed by 300 mg twice daily starting tomorrow morning.  - Can continue Keppra at the current outpatient dose of 1500 twice daily.  - Will send notice to office to schedule follow-up appointment with epilepsy.  - Can continue patient's home aspirin and Brilinta at current doses.  - No additional neurologic recommendations at this time.  - Okay to discharge from a neurologic standpoint.  - Please call questions/concerns

## 2025-03-09 NOTE — CONSULTS
Consultation - Neurology   Name: Constanza Zhu 65 y.o. male I MRN: [id number]  Unit/Bed#: -01 I Date of Admission: 3/8/2025   Date of Service: 3/9/2025 I Hospital Day: 1   Inpatient consult to Neurology  Consult performed by: Rios Vu DO  Consult ordered by: Keyanna Miranda MD        Physician Requesting Evaluation: Keyanna Miranda MD   Reason for Evaluation / Principal Problem: Altered mental status, syncope    Assessment & Plan  Altered mental status    Hypertension    GERD (gastroesophageal reflux disease)    History of CVA (cerebrovascular accident)    Carotid stenosis, bilateral    Seizures (HCC)  Suspect recurrent seizure.  - Discussed with patient regarding increasing Keppra further versus adding a second medication, patient preferred the latter.  - Recommend oxcarbazepine 600 mg orally x 1 followed by 300 mg twice daily starting tomorrow morning.  - Can continue Keppra at the current outpatient dose of 1500 twice daily.  - Will send notice to office to schedule follow-up appointment with epilepsy.  - Can continue patient's home aspirin and Brilinta at current doses.  - No additional neurologic recommendations at this time.  - Okay to discharge from a neurologic standpoint.  - Please call questions/concerns  Tremor of right hand        Constanza Zhu will need follow up in in 4 weeks with epilepsy attending. He will not require outpatient neurological testing.    History of Present Illness   Constanza Zhu is a 65 y.o. right handed male with hypertension, hyperlipidemia, diabetes, carotid stenosis status post bilateral stenting, high-grade right M1 stenosis with a history of watershed territory infarcts. ICA stenting redemonstrated the above described bilateral CTA head and neck ,History of stroke altered mental status.  As per H&P, wife reported who presents with that patient was screaming throwing items in her living room.  He described a blank stare and agitation.  After about 10 minutes the  patient calm down and then became unresponsive while sitting in a chair.  Patient's wife was able to wake him up by vigorous shaking.  However patient was not following commands or answering questions and remained mute.  Wife subsequently called EMS.  Wife reports patient was seen in December for similar episode.  Patient does not recall the episode.  AVSS since arrival-  - CMP, CBC, UA, unremarkable.  - Influenza AB, RSV, COVID PCR negative  -CT head unremarkable for any acute contributory pathology  - CTA head and neck demonstrated the above-mentioned bilateral ICA stenting, High-grade right M1 stenosis versus occlusion, moderate multifocal stenosis in the left M2/M3 branches.  Unchanged from prior imaging.      Seen in the office on 1/22 for follow-up for seizures.  - Reported delirium episode with rage followed by 20 minutes of stiffness with loss of consciousness   Back in December 2024.  - This was following a recent increase in patient's Keppra to 1500 mg twice daily.  Neurology recommending discontinuing trazodone  -EEG from July 2023 demonstrated nearly continuous polymorphic delta over the left frontotemporal region.  No electrographic seizures or interictal discharges noted.          Review of Systems   negative  Medical History Review: I have reviewed the patient's PMH, PSH, Social History, Family History, Meds, and Allergies     Objective :  Temp:  [98.2 °F (36.8 °C)-98.5 °F (36.9 °C)] 98.2 °F (36.8 °C)  HR:  [71-83] 71  BP: (126-149)/(64-74) 135/64  Resp:  [18] 18  SpO2:  [94 %-98 %] 98 %  O2 Device: None (Room air)    Physical Exam   Constitutional:  appears well-developed and well-nourished, no acute distress.  Not cachectic.  Not obese.  Eyes: EOM are normal, gaze is conjugate.  Pupils are equal, round.  Sclera appears normal.  Cardiovascular: Normal rate, regular rhythm.  Pulmonary/Chest: Respiratory Effort normal, no gross coughing, wheezing, or dyspnea.  GI:  No clear abdominal tenderness, no  guarding, no distension,  Skin :  No jaundice, no pallor, no clear erythema, rash or bruising.  Appears dry.  Musculoskeletal:  Range of motion appears within normal, no gross deformities, no clear atrophy.  Psych:  Normal mood and affect, which appear congruent.  Normal thought process and content.  No hallucinations.  No delusions.  Nervous system:  See below    Neurologic Exam   Mental Status    Oriented to person, and place only  Level of consciousness: alert  Normal comprehension.   Cranial Nerves    Visual fields  with partial right homonymous hemianopsia, can see about 15 to 20 degrees past midline towards the right  Pupils are equal, round, and reactive to light.  Extraocular motions are normal.   Nystagmus: none   Face is symmetric with respect to motor and sensory.  Tongue, palate, uvula midline.   Motor Exam   5/5 strength in all four extremities.  Muscle bulk: normal  Overall muscle tone: normal  Sensory Exam   Sensation intact to light touch, temp and vibration.  Gait, Coordination, and Reflexes   DTR's symmetric  No gross ataxia.         Lab Results: I have reviewed the following results:I have personally reviewed pertinent reports.    Recent Labs     03/09/25  0732   WBC 10.04   HGB 14.9   HCT 46.4      SODIUM 139   K 3.8      CO2 27   BUN 24   CREATININE 0.98   GLUC 94   MG 2.1     Imaging Results Review: I personally reviewed the following image studies in PACS and associated radiology reports: CT head and CT angio head and neck. My interpretation of the radiology images/reports is: As reported above.  Other Study Results Review: No additional pertinent studies reviewed.    VTE Prophylaxis: VTE covered by:  enoxaparin, Subcutaneous, 40 mg at 03/09/25 0968

## 2025-03-10 VITALS
HEART RATE: 79 BPM | TEMPERATURE: 97.9 F | DIASTOLIC BLOOD PRESSURE: 64 MMHG | RESPIRATION RATE: 18 BRPM | OXYGEN SATURATION: 98 % | SYSTOLIC BLOOD PRESSURE: 130 MMHG

## 2025-03-10 PROBLEM — R41.82 ALTERED MENTAL STATUS: Status: RESOLVED | Noted: 2023-04-17 | Resolved: 2025-03-10

## 2025-03-10 LAB
ANION GAP SERPL CALCULATED.3IONS-SCNC: 8 MMOL/L (ref 4–13)
BASOPHILS # BLD AUTO: 0.08 THOUSANDS/ÂΜL (ref 0–0.1)
BASOPHILS NFR BLD AUTO: 1 % (ref 0–1)
BUN SERPL-MCNC: 33 MG/DL (ref 5–25)
CALCIUM SERPL-MCNC: 9 MG/DL (ref 8.4–10.2)
CHLORIDE SERPL-SCNC: 105 MMOL/L (ref 96–108)
CO2 SERPL-SCNC: 24 MMOL/L (ref 21–32)
CREAT SERPL-MCNC: 1.08 MG/DL (ref 0.6–1.3)
EOSINOPHIL # BLD AUTO: 0.4 THOUSAND/ÂΜL (ref 0–0.61)
EOSINOPHIL NFR BLD AUTO: 4 % (ref 0–6)
ERYTHROCYTE [DISTWIDTH] IN BLOOD BY AUTOMATED COUNT: 14 % (ref 11.6–15.1)
GFR SERPL CREATININE-BSD FRML MDRD: 71 ML/MIN/1.73SQ M
GLUCOSE SERPL-MCNC: 96 MG/DL (ref 65–140)
HCT VFR BLD AUTO: 45.4 % (ref 36.5–49.3)
HGB BLD-MCNC: 14.4 G/DL (ref 12–17)
IMM GRANULOCYTES # BLD AUTO: 0.06 THOUSAND/UL (ref 0–0.2)
IMM GRANULOCYTES NFR BLD AUTO: 1 % (ref 0–2)
LYMPHOCYTES # BLD AUTO: 3.08 THOUSANDS/ÂΜL (ref 0.6–4.47)
LYMPHOCYTES NFR BLD AUTO: 32 % (ref 14–44)
MCH RBC QN AUTO: 29.1 PG (ref 26.8–34.3)
MCHC RBC AUTO-ENTMCNC: 31.7 G/DL (ref 31.4–37.4)
MCV RBC AUTO: 92 FL (ref 82–98)
MONOCYTES # BLD AUTO: 0.91 THOUSAND/ÂΜL (ref 0.17–1.22)
MONOCYTES NFR BLD AUTO: 9 % (ref 4–12)
NEUTROPHILS # BLD AUTO: 5.26 THOUSANDS/ÂΜL (ref 1.85–7.62)
NEUTS SEG NFR BLD AUTO: 53 % (ref 43–75)
NRBC BLD AUTO-RTO: 0 /100 WBCS
PLATELET # BLD AUTO: 259 THOUSANDS/UL (ref 149–390)
PMV BLD AUTO: 10.2 FL (ref 8.9–12.7)
POTASSIUM SERPL-SCNC: 4.3 MMOL/L (ref 3.5–5.3)
RBC # BLD AUTO: 4.95 MILLION/UL (ref 3.88–5.62)
SODIUM SERPL-SCNC: 137 MMOL/L (ref 135–147)
WBC # BLD AUTO: 9.79 THOUSAND/UL (ref 4.31–10.16)

## 2025-03-10 PROCEDURE — 80048 BASIC METABOLIC PNL TOTAL CA: CPT

## 2025-03-10 PROCEDURE — 85025 COMPLETE CBC W/AUTO DIFF WBC: CPT

## 2025-03-10 PROCEDURE — 99239 HOSP IP/OBS DSCHRG MGMT >30: CPT | Performed by: INTERNAL MEDICINE

## 2025-03-10 RX ORDER — BISACODYL 10 MG
10 SUPPOSITORY, RECTAL RECTAL DAILY
Status: DISCONTINUED | OUTPATIENT
Start: 2025-03-10 | End: 2025-03-10 | Stop reason: HOSPADM

## 2025-03-10 RX ORDER — AMOXICILLIN 250 MG
1 CAPSULE ORAL 2 TIMES DAILY
Status: DISCONTINUED | OUTPATIENT
Start: 2025-03-10 | End: 2025-03-10 | Stop reason: HOSPADM

## 2025-03-10 RX ORDER — POLYETHYLENE GLYCOL 3350 17 G/17G
17 POWDER, FOR SOLUTION ORAL DAILY
Status: DISCONTINUED | OUTPATIENT
Start: 2025-03-10 | End: 2025-03-10 | Stop reason: HOSPADM

## 2025-03-10 RX ORDER — OXCARBAZEPINE 300 MG/1
300 TABLET, FILM COATED ORAL EVERY 12 HOURS SCHEDULED
Qty: 60 TABLET | Refills: 0 | Status: SHIPPED | OUTPATIENT
Start: 2025-03-10

## 2025-03-10 RX ADMIN — METOPROLOL SUCCINATE 50 MG: 50 TABLET, EXTENDED RELEASE ORAL at 09:52

## 2025-03-10 RX ADMIN — HYDROCHLOROTHIAZIDE 12.5 MG: 12.5 TABLET ORAL at 09:52

## 2025-03-10 RX ADMIN — ASPIRIN 81 MG: 81 TABLET, CHEWABLE ORAL at 09:52

## 2025-03-10 RX ADMIN — ATORVASTATIN CALCIUM 40 MG: 40 TABLET, FILM COATED ORAL at 09:51

## 2025-03-10 RX ADMIN — POLYETHYLENE GLYCOL 3350 17 G: 17 POWDER, FOR SOLUTION ORAL at 09:51

## 2025-03-10 RX ADMIN — TICAGRELOR 90 MG: 90 TABLET ORAL at 09:52

## 2025-03-10 RX ADMIN — ENOXAPARIN SODIUM 40 MG: 40 INJECTION SUBCUTANEOUS at 09:51

## 2025-03-10 RX ADMIN — DONEPEZIL HYDROCHLORIDE 10 MG: 5 TABLET ORAL at 09:52

## 2025-03-10 RX ADMIN — OXCARBAZEPINE 300 MG: 300 TABLET, FILM COATED ORAL at 09:51

## 2025-03-10 RX ADMIN — LEVETIRACETAM 1500 MG: 500 TABLET, FILM COATED ORAL at 09:52

## 2025-03-10 RX ADMIN — PANTOPRAZOLE SODIUM 20 MG: 20 TABLET, DELAYED RELEASE ORAL at 09:52

## 2025-03-10 RX ADMIN — AMANTADINE HYDROCHLORIDE 100 MG: 100 CAPSULE ORAL at 09:52

## 2025-03-10 RX ADMIN — SENNOSIDES AND DOCUSATE SODIUM 1 TABLET: 50; 8.6 TABLET ORAL at 09:52

## 2025-03-10 NOTE — ASSESSMENT & PLAN NOTE
Per chart review, patient with history of bilateral carotid stenosis with bilateral MCA strokes.  Patient had right MCA thrombectomy in 2019 and left MCA thrombectomy/stenting in 2023.  CTA head and neck showed status post bilateral carotid stent placement, intimal hyperplasia noted without significant stenosis.  Continue aspirin 81 mg, daily; Brilinta 90 mg, every 12 hours; and atorvastatin 40 mg, daily.  Per review, patient to follow-up for repeat VAS carotid duplex around August 2025.

## 2025-03-10 NOTE — ASSESSMENT & PLAN NOTE
Patient with evident mild tremor of the right hand. Per wife, this is new for the patient.   Intermittently.  Follow-up with neurology outpatient

## 2025-03-10 NOTE — ASSESSMENT & PLAN NOTE
"Patient with history of seizures, but per wife patient has not had a seizure in over a year.  However, per chart review patient with \"rigid\" like activity on last admission in December '24, where this was thought to be a side effect of dystonia.  Home Regimen: Keppra 1500 mg, twice daily.  Per neurology continue Keppra 1500 milligrams twice daily and Trileptal that was started.  Follow-up with epilepsy clinic.  Discussed with wife.  "

## 2025-03-10 NOTE — ASSESSMENT & PLAN NOTE
"The patient presented to the hospital via EMS after wife called due to patient having episode of altered mental status.  The patient does have a significant history of CVA and seizures.  The patient's wife explaining symptoms of similar episodes of seizure-like activity per chart review.  Per wife,  patient became suddenly agitated and wife noticing \"blank\" stare.  After, patient becoming extremely somnolent and likely postictal.      In December '24, patient noted to have AMS and was considered likely due to Keppra increase.  However, appears that AMS may be secondary to seizure.    CT head (3/8): \"No acute intracranial abnormality. Chronic microangiopathic changes. Chronic infarcts are noted.\"   CTA head/neck (3/8): \"CT Angiography: Status post bilateral carotid stent placement. Intimal hyperplasia noted without significant stenosis. High-grade stenosis versus occlusion distal M1 segment right middle cerebral artery unchanged from prior outside CT angiogram. Moderate multifocal stenosis left M2 and M3 branches.\"  CK - normal  Keppra levels- 62.6-->37     UA negative   COVID/FLU negative  Patient's otherwise testing and imaging negative for any acute changes to explain altered mental status.  Neurology consulted, recommended Trileptal that was already started.  Patient alert and oriented x 2 which is his baseline without any episodes of seizure like.  Follow-up with neurology outpatient.  "

## 2025-03-10 NOTE — DISCHARGE SUMMARY
"Discharge Summary - Hospitalist   Name: Constanza Zhu 65 y.o. male I MRN: [id number]  Unit/Bed#: -01 I Date of Admission: 3/8/2025   Date of Service: 3/10/2025 I Hospital Day: 2     Assessment & Plan  Altered mental status  The patient presented to the hospital via EMS after wife called due to patient having episode of altered mental status.  The patient does have a significant history of CVA and seizures.  The patient's wife explaining symptoms of similar episodes of seizure-like activity per chart review.  Per wife,  patient became suddenly agitated and wife noticing \"blank\" stare.  After, patient becoming extremely somnolent and likely postictal.      In December '24, patient noted to have AMS and was considered likely due to Keppra increase.  However, appears that AMS may be secondary to seizure.    CT head (3/8): \"No acute intracranial abnormality. Chronic microangiopathic changes. Chronic infarcts are noted.\"   CTA head/neck (3/8): \"CT Angiography: Status post bilateral carotid stent placement. Intimal hyperplasia noted without significant stenosis. High-grade stenosis versus occlusion distal M1 segment right middle cerebral artery unchanged from prior outside CT angiogram. Moderate multifocal stenosis left M2 and M3 branches.\"  CK - normal  Keppra levels- 62.6-->37     UA negative   COVID/FLU negative  Patient's otherwise testing and imaging negative for any acute changes to explain altered mental status.  Neurology consulted, recommended Trileptal that was already started.  Patient alert and oriented x 2 which is his baseline without any episodes of seizure like.  Follow-up with neurology outpatient.  History of CVA (cerebrovascular accident)  Patient with history of bilateral MCA cerebrovascular accidents and s/p carotid stenting.  Continue aspirin and statin therapy.    Seizures (HCC)  Patient with history of seizures, but per wife patient has not had a seizure in over a year.  However, per chart " "review patient with \"rigid\" like activity on last admission in December '24, where this was thought to be a side effect of dystonia.  Home Regimen: Keppra 1500 mg, twice daily.  Per neurology continue Keppra 1500 milligrams twice daily and Trileptal that was started.  Follow-up with epilepsy clinic.  Discussed with wife.  Tremor of right hand  Patient with evident mild tremor of the right hand. Per wife, this is new for the patient.   Intermittently.  Follow-up with neurology outpatient  Carotid stenosis, bilateral  Per chart review, patient with history of bilateral carotid stenosis with bilateral MCA strokes.  Patient had right MCA thrombectomy in 2019 and left MCA thrombectomy/stenting in 2023.  CTA head and neck showed status post bilateral carotid stent placement, intimal hyperplasia noted without significant stenosis.  Continue aspirin 81 mg, daily; Brilinta 90 mg, every 12 hours; and atorvastatin 40 mg, daily.  Per review, patient to follow-up for repeat VAS carotid duplex around August 2025.  Hypertension  Per chart review, it appears that patient's SBP goal in the outpatient setting is < 130/80 mmHg.   Home Regimen:   Hydrochlorothiazide 12.5 mg, daily  Toprol-XL 50 mg, daily  Continue home regimen of antihypertensives.  Blood pressure stable  GERD (gastroesophageal reflux disease)  Continue pantoprazole 40 mg, daily.     Medical Problems       Resolved Problems  Date Reviewed: 3/9/2025   None       Discharging Physician / Practitioner: Pilar Glaser MD  PCP: ERIC Calixto  Admission Date:   Admission Orders (From admission, onward)       Ordered        03/08/25 1428  INPATIENT ADMISSION  Once                          Discharge Date: 03/10/25    Consultations During Hospital Stay:  Neurology    Procedures Performed:   None    Significant Findings / Test Results:   XR chest 1 view portable  Result Date: 3/8/2025  Impression: No acute cardiopulmonary disease.    CT head without " contrast  Result Date: 3/8/2025  Impression: No acute intracranial abnormality. Chronic microangiopathic changes. Chronic infarcts are noted.    CTA head and neck with and without contrast  Result Date: 3/8/2025  Impression: CT Angiography: Status post bilateral carotid stent placement. Intimal hyperplasia noted without significant stenosis. High-grade stenosis versus occlusion distal M1 segment right middle cerebral artery unchanged from prior outside CT angiogram. Moderate multifocal stenosis left M2 and M3 branches.     Incidental Findings:   none     Test Results Pending at Discharge (will require follow up):   none     Outpatient Tests Requested:  none    Complications:  none    Reason for Admission: [facility]    Hospital Course:   Constanza Zhu is a 65 y.o. male patient with history of depression, diabetes, GERD, hypertension, CVA, carotid stenosis, epilepsy/seizure who originally presented to the hospital on 3/8/2025 due to altered mental status.  Per wife he had episodes of stiffness followed by a blank stare and unconsciousness afterwards.  Lab work and imaging was negative for acute findings, infection or metabolic derangements.     Neurology consulted, patient was started on Trileptal.    Patient currently at baseline, stable for discharge.  Will follow-up with neurology/epilepsy outpatient        Please see above list of diagnoses and related plan for additional information.     Condition at Discharge: good    Discharge Day Visit / Exam:   Subjective: Denies chest pain, nausea, vomiting.  Not sure when he had a last bowel movement.  Vitals: Blood Pressure: 130/64 (03/10/25 1459)  Pulse: 79 (03/10/25 1459)  Temperature: 97.9 °F (36.6 °C) (03/10/25 0740)  Temp Source: Oral (03/10/25 0740)  Respirations: 18 (03/10/25 1459)  SpO2: 98 % (03/10/25 1459)  Physical Exam  Vitals and nursing note reviewed.   Constitutional:       General: He is not in acute distress.     Appearance: He is not diaphoretic.   HENT:       Head: Normocephalic.   Eyes:      General:         Right eye: No discharge.         Left eye: No discharge.   Cardiovascular:      Rate and Rhythm: Normal rate and regular rhythm.   Pulmonary:      Effort: Pulmonary effort is normal. No respiratory distress.      Breath sounds: Normal breath sounds. No wheezing, rhonchi or rales.   Abdominal:      General: There is no distension.      Palpations: Abdomen is soft.      Tenderness: There is no abdominal tenderness. There is no guarding or rebound.   Musculoskeletal:      Cervical back: Normal range of motion.      Right lower leg: No edema.      Left lower leg: No edema.   Skin:     General: Skin is warm.   Neurological:      Mental Status: He is alert.      Comments: And oriented to self and place.  Disoriented to date   Psychiatric:         Mood and Affect: Mood normal.         Behavior: Behavior normal.          Discussion with Family: Updated  (wife) via phone.    Discharge instructions/Information to patient and family:   See after visit summary for information provided to patient and family.      Provisions for Follow-Up Care:  See after visit summary for information related to follow-up care and any pertinent home health orders.      Mobility at time of Discharge:   Basic Mobility Inpatient Raw Score: 18  JH-HLM Goal: 6: Walk 10 steps or more  JH-HLM Achieved: 6: Walk 10 steps or more  HLM Goal achieved. Continue to encourage appropriate mobility.     Disposition:   Home    Planned Readmission: no    Discharge Medications:  See after visit summary for reconciled discharge medications provided to patient and/or family.      Administrative Statements   Discharge Statement:  I have spent a total time of 40 minutes in caring for this patient on the day of the visit/encounter. .    **Please Note: This note may have been constructed using a voice recognition system**

## 2025-03-10 NOTE — UTILIZATION REVIEW
Marina Antunez, Utilization Review Department sent the notification of admission with initial clinical to the Payer for authorization 03/10/25 12:52 PM.

## 2025-03-10 NOTE — OCCUPATIONAL THERAPY NOTE
Occupational Therapy Screen Note     Patient Name: Constanza Zhu  Today's Date: 3/10/2025  Problem List  Principal Problem:    Altered mental status  Active Problems:    Hypertension    GERD (gastroesophageal reflux disease)    History of CVA (cerebrovascular accident)    Carotid stenosis, bilateral    Seizures (HCC)    Tremor of right hand            03/10/25 1234   OT Last Visit   OT Visit Date 03/10/25   Note Type   Note type Screen   Additional Comments OT orders received, chart reviewed. Pt receives assistance at baseline for ADLs. Per RN - pt is supervision in room with RW & was walking unit halls with wife yesterday. Pt at baseline, spoke with pt who states he feels he is also at his baseline. No acute OT needs indicated, DC OT orders     MILKA Rosas/L

## 2025-03-10 NOTE — UTILIZATION REVIEW
"Initial Clinical Review    Admission: Date/Time/Statement:   Admission Orders (From admission, onward)       Ordered        03/08/25 1428  INPATIENT ADMISSION  Once                          Orders Placed This Encounter   Procedures    INPATIENT ADMISSION     Standing Status:   Standing     Number of Occurrences:   1     Level of Care:   Med Surg [16]     Estimated length of stay:   More than 2 Midnights     Certification:   I certify that inpatient services are medically necessary for this patient for a duration of greater than two midnights. See H&P and MD Progress Notes for additional information about the patient's course of treatment.     ED Arrival Information       Expected   -    Arrival   3/8/2025 10:53    Acuity   Emergent              Means of arrival   Ambulance    Escorted by   [facility]    Service   Hospitalist    Admission type   Emergency              Arrival complaint   Ams             Chief Complaint   Patient presents with    Altered Mental Status     wife called EMS for altered mental status/angry outburst at home.EMS reports the patient has been going through an outpatient workup for dementia that has been negative so far. At time of assessment patient is only oriented to his name, unable to provide his date of birth. Patient reports this is normal since his stroke, but is unclear on when the stroke was.  Patient calm and cooperative at time of triage. Patient does not remember what happened this morning, reports everything just went black.         Initial Presentation: 65 y.o. male who presented by EMS to [facility] ED. Admitted as Inpatient for evaluation and treatment of AMS. Pertinent PMHx: T2DM, GERD, HLD, HTN, stroke. Presented w/ sudden change in mental status. Pt wife reports that pt went from bed to reclining chair, then suddenly started screaming and throwing items. Also states pt had a \"blank\" stare and was unable to calm the pt down for 10 min, then pt became " unconscious in the reclining chair. Pt then awoke but did not answer questions. Pt apparently had similar episode in December. EXAM: R hand tremors, aphasia, flat affect. Imaging unremarkable for acute abnormalities. PLAN: check UA, telemetry, echo, orthostatic vitals, q4h neuro checks, continue PTA meds, hold Keppra as level elevated @ 62.6; supportive care. Neurology consulted.     Anticipated Length of Stay/Certification Statement: Patient will be admitted on an inpatient basis with an anticipated length of stay of greater than 2 midnights secondary to neurology consultation, PT/OT consultation, psychiatry consultation.     Date: 3/9   Day 2: Generalized weakness. Keppra level dropped to 37 this morning. Continue Keppra BID, continue other current meds, supportive care. Trend labs, replete electrolytes as needed.    Neurology: Pt w/ suspected recurrent seizure. Plan: start oxcarbazepine 600 mg x1, then 300 mg BID. Continue Keppra at current dose (1500 mg BID). Continue ASA/Plavix.      ED Treatment-Medication Administration from 03/08/2025 1052 to 03/08/2025 1450         Date/Time Order Dose Route Action     03/08/2025 1118 sodium chloride 0.9 % bolus 500 mL 500 mL Intravenous New Bag     03/08/2025 1206 iohexol (OMNIPAQUE) 350 MG/ML injection (MULTI-DOSE) 85 mL 85 mL Intravenous Given            Scheduled Medications:  amantadine, 100 mg, Oral, BID  aspirin, 81 mg, Oral, Daily  atorvastatin, 40 mg, Oral, Daily With Breakfast  donepezil, 10 mg, Oral, Daily  enoxaparin, 40 mg, Subcutaneous, Daily  hydroCHLOROthiazide, 12.5 mg, Oral, Daily  insulin lispro, 1-6 Units, Subcutaneous, TID AC  insulin lispro, 1-6 Units, Subcutaneous, HS  levETIRAcetam, 1,500 mg, Oral, Q12H JOSE  melatonin, 6 mg, Oral, HS  metoprolol succinate, 50 mg, Oral, Daily  mirtazapine, 7.5 mg, Oral, HS  [START ON 03/09/25] OXcarbazepine, 300 mg, Oral, Q12H JOSE  pantoprazole, 20 mg, Oral, Daily  polyethylene glycol, 17 g, Oral, Daily  QUEtiapine,  25 mg, Oral, HS  senna-docusate sodium, 1 tablet, Oral, BID  tamsulosin, 0.4 mg, Oral, Daily With Dinner  ticagrelor, 90 mg, Oral, Q12H JOSE    Continuous IV Infusions: none    PRN Meds:  acetaminophen, 650 mg, Oral, Q6H PRN  calcium carbonate, 1,000 mg, Oral, Daily PRN      OXcarbazepine (TRILEPTAL) tablet 600 mg  Dose: 600 mg Freq: Once Route: PO  Start: 03/09/25 1745 End: 03/09/25 1749       ED Triage Vitals [03/08/25 1104]   Temperature Pulse Respirations Blood Pressure SpO2 Pain Score   97.8 °F (36.6 °C) 71 18 126/80 99 % No Pain     Weight (last 2 days)       None            Vital Signs (last 3 days)       Date/Time Temp Pulse Resp BP MAP (mmHg) SpO2 O2 Device Patient Position - Orthostatic VS [place] Coma Scale Score Pain    03/10/25 1000 -- -- -- -- -- -- None (Room air) -- 14 No Pain    03/10/25 09:51:10 -- 101 -- 129/72 91 98 % -- -- -- --    03/10/25 07:40:02 97.9 °F (36.6 °C) 74 17 144/63 90 93 % None (Room air) Lying -- --    03/10/25 05:24:27 98.6 °F (37 °C) 79 18 141/78 99 97 % None (Room air) Sitting -- --    03/09/25 20:50:56 98.5 °F (36.9 °C) 77 19 131/72 92 93 % None (Room air) Sitting 14 No Pain    03/09/25 19:03:42 98.8 °F (37.1 °C) 87 20 141/74 96 97 % None (Room air) Sitting -- --    03/09/25 1000 -- -- -- -- -- -- None (Room air) -- 14 No Pain    03/09/25 09:58:35 -- 71 -- 135/64 88 98 % -- -- -- --    03/09/25 07:20:23 98.2 °F (36.8 °C) 76 -- 126/72 90 94 % -- -- -- --    03/09/25 0013 -- -- -- -- -- -- -- -- 14 No Pain    03/08/25 20:10:56 98.5 °F (36.9 °C) 83 18 149/74 99 97 % -- -- -- --    03/08/25 1700 -- 68 -- -- -- 98 % -- -- -- --    03/08/25 16:37:17 -- 69 -- 156/78 104 99 % -- -- -- --    03/08/25 15:09:21 97.9 °F (36.6 °C) 79 17 134/75 95 98 % None (Room air) Lying 14 No Pain    03/08/25 1430 -- 75 19 147/64 92 99 % -- -- -- --    03/08/25 1403 -- 62 16 148/67 97 98 % -- -- -- --    03/08/25 1333 -- 64 16 132/61 88 99 % -- -- -- --    03/08/25 1305 -- 69 17 157/70 100 99 % -- -- --  --    03/08/25 1133 -- -- -- -- -- -- -- -- 14 No Pain    03/08/25 1130 -- 74 20 145/68 98 98 % -- -- -- --    03/08/25 1105 -- -- -- -- -- -- -- -- 14 --    03/08/25 1104 97.8 °F (36.6 °C) 71 18 126/80 86 99 % None (Room air) Sitting -- No Pain              Pertinent Labs/Diagnostic Test Results:   Radiology:  CTA head and neck with and without contrast   Final Interpretation by Robert Rizzo DO (03/08 1320)         CT Angiography: Status post bilateral carotid stent placement. Intimal hyperplasia noted without significant stenosis.      High-grade stenosis versus occlusion distal M1 segment right middle cerebral artery unchanged from prior outside CT angiogram. Moderate multifocal stenosis left M2 and M3 branches.                     Workstation performed: [id number]         CT head without contrast   Final Interpretation by Robert Rizzo DO (03/08 1321)      No acute intracranial abnormality. Chronic microangiopathic changes. Chronic infarcts are noted.                  Workstation performed: [id number]         XR chest 1 view portable   Final Interpretation by Mae Franco MD (03/08 1431)      No acute cardiopulmonary disease.            Workstation performed: [id number]           Cardiology:  ECG 12 lead   Final Result by Keegan Viramontes MD (03/08 1412)   Normal sinus rhythm   Cannot rule out Inferior infarct , age undetermined   Abnormal ECG   When compared with ECG of 24-Dec-2024 09:22,   Nonspecific T wave abnormality, worse in Lateral leads   Confirmed by Keegan Viramontes ([id number]) on 3/8/2025 2:12:48 PM           Component      Latest Ref Rng 3/8/2025 3/9/2025   LEVETIRACETA (KEPPRA)      12.0 - 46.0 ug/mL 62.6 (H)  37.1         Results from last 7 days   Lab Units 03/08/25  1120   SARS-COV-2  Negative     Results from last 7 days   Lab Units 03/10/25  0302 03/09/25  0732 03/08/25  1107   WBC Thousand/uL 9.79 10.04 7.91   HEMOGLOBIN g/dL 14.4 14.9 15.2   HEMATOCRIT % 45.4 46.4 47.6    PLATELETS Thousands/uL 259 244 245   TOTAL NEUT ABS Thousands/µL 5.26  --  4.94         Results from last 7 days   Lab Units 03/10/25  0302 03/09/25  0732 03/08/25  1107   SODIUM mmol/L 137 139 139   POTASSIUM mmol/L 4.3 3.8 3.9   CHLORIDE mmol/L 105 104 103   CO2 mmol/L 24 27 30   ANION GAP mmol/L 8 8 6   BUN mg/dL 33* 24 28*   CREATININE mg/dL 1.08 0.98 1.09   EGFR ml/min/1.73sq m 71 80 70   CALCIUM mg/dL 9.0 9.1 9.5   MAGNESIUM mg/dL  --  2.1 2.2     Results from last 7 days   Lab Units 03/09/25  0732 03/08/25  1107   AST U/L 18 20   ALT U/L 23 26   ALK PHOS U/L 112* 124*   TOTAL PROTEIN g/dL 7.0 7.1   ALBUMIN g/dL 4.1 4.3   TOTAL BILIRUBIN mg/dL 0.67 0.44     Results from last 7 days   Lab Units 03/09/25  2053 03/09/25  1654 03/09/25  1118 03/09/25  0718 03/08/25 2010 03/08/25  1704   POC GLUCOSE mg/dl 121 145* 94 101 112 82     Results from last 7 days   Lab Units 03/10/25  0302 03/09/25  0732 03/08/25  1107   GLUCOSE RANDOM mg/dL 96 94 98     Results from last 7 days   Lab Units 03/09/25  0732   CK TOTAL U/L 50     Results from last 7 days   Lab Units 03/08/25  1318 03/08/25  1107   HS TNI 0HR ng/L  --  9   HS TNI 2HR ng/L 8  --    HSTNI D2 ng/L -1  --      Results from last 7 days   Lab Units 03/08/25  1129   CLARITY UA  Clear   COLOR UA  Yellow   SPEC GRAV UA  1.020   PH UA  6.5   GLUCOSE UA mg/dl 70 (7/100%)*   KETONES UA mg/dl Negative   BLOOD UA  Negative   PROTEIN UA mg/dl Trace*   NITRITE UA  Negative   BILIRUBIN UA  Negative   UROBILINOGEN UA (BE) mg/dl <2.0   LEUKOCYTES UA  Negative   WBC UA /hpf 0-1   RBC UA /hpf None Seen   BACTERIA UA /hpf Occasional   EPITHELIAL CELLS WET PREP /hpf Occasional     Results from last 7 days   Lab Units 03/08/25  1120   INFLUENZA A PCR  Negative   INFLUENZA B PCR  Negative   RSV PCR  Negative     Results from last 7 days   Lab Units 03/08/25  1128   URINE CULTURE  No Growth <1000 cfu/mL           Past Medical History:   Diagnosis Date    CTS (carpal tunnel  syndrome) 4/2003    Depression     Diabetes mellitus (HCC)     patient denies    Dyslipidemia 03/26/2019    GERD (gastroesophageal reflux disease)     HL (hearing loss) 1/2005    Hyperlipidemia     Hypertension     Memory loss 3/2019    Prediabetes     Prediabetes 04/03/2019    Stroke (HCC)      Present on Admission:   Altered mental status   Carotid stenosis, bilateral   GERD (gastroesophageal reflux disease)   Seizures (HCC)   Hypertension      Admitting Diagnosis: Dehydration [E86.0]  Syncope [R55]  Altered mental status [R41.82]  AMS (altered mental status) [R41.82]  Age/Sex: 65 y.o. male    Network Utilization Review Department  ATTENTION: Please call with any questions or concerns to [phone] and carefully listen to the prompts so that you are directed to the right person. All voicemails are confidential.   For Discharge needs, contact Care Management DC Support Team at [phone] opt. 2  Send all requests for admission clinical reviews, approved or denied determinations and any other requests to dedicated fax number below belonging to the campus where the patient is receiving treatment. List of dedicated fax numbers for the Facilities:  FACILITY NAME UR FAX NUMBER   ADMISSION DENIALS (Administrative/Medical Necessity) [phone]   DISCHARGE SUPPORT TEAM (NETWORK) [phone]   PARENT CHILD HEALTH (Maternity/NICU/Pediatrics) [phone]   [facility] [phone]   [facility] [phone]   [facility] [phone]   [facility] [phone]   [facility] [phone]   [facility] [phone]   [facility] [phone]   [facility] [phone]   [facility] [phone]   [facility] [phone]   [facility]  [place] [phone]   [facility] [place] [phone]

## 2025-03-10 NOTE — PLAN OF CARE
Problem: PAIN - ADULT  Goal: Verbalizes/displays adequate comfort level or baseline comfort level  Description: Interventions:  - Encourage patient to monitor pain and request assistance  - Assess pain using appropriate pain scale  - Administer analgesics based on type and severity of pain and evaluate response  - Implement non-pharmacological measures as appropriate and evaluate response  - Consider cultural and social influences on pain and pain management  - Notify physician/advanced practitioner if interventions unsuccessful or patient reports new pain  Outcome: Progressing     Problem: INFECTION - ADULT  Goal: Absence or prevention of progression during hospitalization  Description: INTERVENTIONS:  - Assess and monitor for signs and symptoms of infection  - Monitor lab/diagnostic results  - Monitor all insertion sites, i.e. indwelling lines, tubes, and drains  - Monitor endotracheal if appropriate and nasal secretions for changes in amount and color  - [place] appropriate cooling/warming therapies per order  - Administer medications as ordered  - Instruct and encourage patient and family to use good hand hygiene technique  - Identify and instruct in appropriate isolation precautions for identified infection/condition  Outcome: Progressing  Goal: Absence of fever/infection during neutropenic period  Description: INTERVENTIONS:  - Monitor WBC    Outcome: Progressing     Problem: SAFETY ADULT  Goal: Patient will remain free of falls  Description: INTERVENTIONS:  - Educate patient/family on patient safety including physical limitations  - Instruct patient to call for assistance with activity   - Consult OT/PT to assist with strengthening/mobility   - Keep Call bell within reach  - Keep bed low and locked with side rails adjusted as appropriate  - Keep care items and personal belongings within reach  - Initiate and maintain comfort rounds  - Make Fall Risk Sign visible to staff  - Offer Toileting every 2 Hours,  in advance of need  - Initiate/Maintain 2alarm  - Obtain necessary fall risk management equipment: 2  - Apply yellow socks and bracelet for high fall risk patients  - Consider moving patient to room near nurses station  Outcome: Progressing  Goal: Maintain or return to baseline ADL function  Description: INTERVENTIONS:  -  Assess patient's ability to carry out ADLs; assess patient's baseline for ADL function and identify physical deficits which impact ability to perform ADLs (bathing, care of mouth/teeth, toileting, grooming, dressing, etc.)  - Assess/evaluate cause of self-care deficits   - Assess range of motion  - Assess patient's mobility; develop plan if impaired  - Assess patient's need for assistive devices and provide as appropriate  - Encourage maximum independence but intervene and supervise when necessary  - Involve family in performance of ADLs  - Assess for home care needs following discharge   - Consider OT consult to assist with ADL evaluation and planning for discharge  - Provide patient education as appropriate  Outcome: Progressing  Goal: Maintains/Returns to pre admission functional level  Description: INTERVENTIONS:  - Perform AM-PAC 6 Click Basic Mobility/ Daily Activity assessment daily.  - Set and communicate daily mobility goal to care team and patient/family/caregiver.   - Collaborate with rehabilitation services on mobility goals if consulted  - Perform Range of Motion 2 times a day.  - Reposition patient every 2 hours.  - Dangle patient 2 times a day  - Stand patient 2 times a day  - Ambulate patient 2 times a day  - Out of bed to chair 2 times a day   - Out of bed for meals 2 times a day  - Out of bed for toileting  - Record patient progress and toleration of activity level   Outcome: Progressing     Problem: DISCHARGE PLANNING  Goal: Discharge to home or other facility with appropriate resources  Description: INTERVENTIONS:  - Identify barriers to discharge w/patient and caregiver  -  Arrange for needed discharge resources and transportation as appropriate  - Identify discharge learning needs (meds, wound care, etc.)  - Arrange for interpretive services to assist at discharge as needed  - Refer to Case Management Department for coordinating discharge planning if the patient needs post-hospital services based on physician/advanced practitioner order or complex needs related to functional status, cognitive ability, or social support system  Outcome: Progressing     Problem: Knowledge Deficit  Goal: Patient/family/caregiver demonstrates understanding of disease process, treatment plan, medications, and discharge instructions  Description: Complete learning assessment and assess knowledge base.  Interventions:  - Provide teaching at level of understanding  - Provide teaching via preferred learning methods  Outcome: Progressing     Problem: Prexisting or High Potential for Compromised Skin Integrity  Goal: Skin integrity is maintained or improved  Description: INTERVENTIONS:  - Identify patients at risk for skin breakdown  - Assess and monitor skin integrity  - Assess and monitor nutrition and hydration status  - Monitor labs   - Assess for incontinence   - Turn and reposition patient  - Assist with mobility/ambulation  - Relieve pressure over bony prominences  - Avoid friction and shearing  - Provide appropriate hygiene as needed including keeping skin clean and dry  - Evaluate need for skin moisturizer/barrier cream  - Collaborate with interdisciplinary team   - Patient/family teaching  - Consider wound care consult   Outcome: Progressing

## 2025-03-10 NOTE — PHYSICAL THERAPY NOTE
PHYSICAL THERAPY SCREEN NOTE      Patient Name: Constanza Zhu  Today's Date: 3/10/2025       03/10/25 3335   Note Type   Note type Screen   Additional Comments PT orders received, chart review performed. Spoke to RN Trudi, who reports patient ambulating in [place] w/ wife over weekend. AMS resolved, pt reports feeling at baseline and getting OPPT services. Appears patient is back to physical and mental baseline, no acute PT needs, will DC PT       Zainab Alvarado, PT, DPT

## 2025-03-10 NOTE — UTILIZATION REVIEW
NOTIFICATION OF INPATIENT ADMISSION   AUTHORIZATION REQUEST   SERVICING FACILITY:   [place] [address]  Tax ID: [id number]  NPI: [id number] ATTENDING PROVIDER:  Attending Name and NPI#: Pilar Glaser Md [[id number]]  Address: [address] [place] [address]  Phone: [phone]   ADMISSION INFORMATION:  Place of Service: Inpatient [facility]  Place of Service Code: 21  Inpatient Admission Date/Time: 3/8/25  2:28 PM  Discharge Date/Time: No discharge date for patient encounter.  Admitting Diagnosis Code/Description:  Dehydration [E86.0]  Syncope [R55]  Altered mental status [R41.82]  AMS (altered mental status) [R41.82]     UTILIZATION REVIEW CONTACT:  Marina Antunez, Utilization   Network Utilization Review Department  Phone: [phone]  Fax: [phone]  Email: [email or web address]  Contact for approvals/pending authorizations, clinical reviews, and discharge.     PHYSICIAN ADVISORY SERVICES:  Medical Necessity Denial & Tscx-ow-Pqkg Review  Phone: [phone]  Fax: [phone]  Email: [email or web address]     DISCHARGE SUPPORT TEAM:  For Patients Discharge Needs & Updates  Phone: [phone] opt. 2 Fax: [phone]  Email: [email or web address]

## 2025-03-10 NOTE — TELEPHONE ENCOUNTER
FOLLOW UP: Please review and advise. Pt currently admitted to [facility]. Requesting call back to Faiza, pt's spouse.     3/11/25 @ 1284 with Dr. Steele for Left  Ischemic MCA. Faiza would like to keep pt's appt scheduled for tomorrow 3/11/25. Stated pt may be discharged today.     REASON FOR CONVERSATION: Agitation and Tremors    SYMPTOMS: left hand tremor (new), agitation, unresponsiveness, pinpoint pupils during episode, no clue of surroundings and randomly yells mixed up sentences (word salad)    OTHER: Pt had episode with symptoms above on 3/8/25. Reported episodes occur about 2 hours after waking up once pt receives his daily medications, eat breakfast, and converses with his spouse. Faiza stated that on 3/8 pt informed her that he was not feeling well and the pt's dogs were very attentive to pt and crawled over to him and laid on his lap right before episode occurred. Stated that this is not normal for the dogs, that they typically do not jump onto anyone.     EPISODE: pt became agitated, had word salad, throwing stuff around the house, eyes became a pinpoint, left hand tremor (new since prior episodes) and became unresponsive for about 10 minutes. Faiza called 911 and transported to ED. Stated pt does not remember what occurred.      Faiza is concerned. Stated that pt is mostly self-sufficient at home and would like pt to be able to stay home and not be advised to discuss possibly rehabs or nursing facilities.      DISPOSITION: Discuss with Provider and Call Back Patient

## 2025-03-10 NOTE — ASSESSMENT & PLAN NOTE
Per chart review, it appears that patient's SBP goal in the outpatient setting is < 130/80 mmHg.   Home Regimen:   Hydrochlorothiazide 12.5 mg, daily  Toprol-XL 50 mg, daily  Continue home regimen of antihypertensives.  Blood pressure stable

## 2025-03-10 NOTE — CASE MANAGEMENT
Case Management Assessment & Discharge Planning Note    Patient name Constanza Zhu  Location /-01 MRN [id number]  : 1960 Date 3/10/2025       Current Admission Date: 3/8/2025  Current Admission Diagnosis:Altered mental status   Patient Active Problem List    Diagnosis Date Noted Date Diagnosed    Seizures ([facility]) 2025     Tremor of right hand 2025     Closed nondisplaced fracture of phalanx of right great toe 2024     Cerebral ventriculomegaly 10/10/2024     Iliac artery stenosis, left ([facility]) 2024     Carotid stenosis, bilateral 2024     PVC (premature ventricular contraction) 2024     Acute stroke due to ischemia ([facility]) 2024     Internal carotid artery stenosis, left 2024     Acute CVA (cerebrovascular accident) ([facility]) 2024     COVID 2024     Internal carotid artery stent present 2024     Vision problem 2024     Dry eyes 2024     Focal epilepsy ([facility]) 2023     Claudication of both lower extremities ([facility]) 2023     Type 2 diabetes mellitus with other diabetic ophthalmic complication ([facility]) 2023     Atherosclerosis of native arteries of extremities with intermittent claudication, bilateral legs ([facility]) 2023    Benign prostatic hyperplasia with lower urinary tract symptoms 2023    Possible NPH (normal pressure hydrocephalus) ([facility]) 2023     Muscle spasms of both lower extremities 2023     Multiple thyroid nodules 2023     Abnormal laboratory test 05/15/2023     History of tobacco use 2023     History of CVA (cerebrovascular accident) 2023     Infrarenal abdominal aortic aneurysm (AAA) without rupture ([facility]) 2023     Tachycardia 2023     Prediabetes 2023     SIRS (systemic inflammatory response syndrome) ([facility]) 2023     Recurrent strokes ([facility]) 2023     Hyponatremia 2023     Middle cerebral artery stenosis, right  04/17/2023     Altered mental status 04/17/2023     Chronic low back pain 04/16/2023     Hypertensive encephalopathy, transient 01/12/2023     Snoring 08/10/2020     Memory deficits 08/10/2020     Status post placement of implantable loop recorder 04/06/2019     History of prediabetes 04/03/2019     Anxiety and depression 03/29/2019     Insomnia 03/29/2019     Fall 03/28/2019     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) 03/28/2019     Urinary retention 03/27/2019     At risk for venous thromboembolism (VTE) 03/26/2019     Hypertriglyceridemia 03/26/2019     Nicotine dependence 03/26/2019     Left carotid stenosis 03/26/2019     Presbyopia 03/26/2019     History of stroke 03/19/2019     Headache 03/19/2019     Hypertension 03/19/2019     GERD (gastroesophageal reflux disease) 03/19/2019       LOS (days): 2  Geometric Mean LOS (GMLOS) (days): 2.7  Days to GMLOS:0.8     OBJECTIVE:    Risk of Unplanned Readmission Score: 20.64         Current admission status: Inpatient       Preferred Pharmacy:   [facility] [address] [place] [address]  Phone: [phone] Fax: [phone]    [facility] - [address] [address]  Phone: [phone] Fax: [phone]    Primary Care Provider: ERIC Calixto    Primary Insurance: BLUE CROSS  Secondary Insurance: MEDICARE    ASSESSMENT:  Active Health Care Proxies       KATJA MYERS Health Care Representative - Spouse   Primary Phone: [phone] (Mobile)  Home Phone: [phone]                 Advance Directives  Does patient have a Health Care POA?: Yes  Does patient have Advance Directives?: Yes  Advance Directives: Power of  for health care, Power of  for finance  Primary Contact: jade Kendall.         Readmission Root Cause  30 Day Readmission: No    Patient Information  Admitted from::  Home  Mental Status: Confused, Alert  During Assessment patient was accompanied by: Spouse  Assessment information provided by:: Patient, Spouse  Primary Caregiver: Family  Caregiver's Name:: Faiza Luowin, spouse.  Caregiver's Relationship to Patient:: Family Member  Caregiver's Telephone Number:: [phone]  Support Systems: Spouse/significant other, Family members, Friends/neighbors  County of Residence: [place]  What city do you live in?: [place]  Home entry access options. Select all that apply.: Stairs  Number of steps to enter home.: 2  Type of Current Residence: 2 story home  Upon entering residence, is there a bedroom on the main floor (no further steps)?: Yes  Upon entering residence, is there a bathroom on the main floor (no further steps)?: Yes  Living Arrangements: Lives w/ Spouse/significant other  Is patient a ?: No    Activities of Daily Living Prior to Admission  Functional Status: Assistance  Completes ADLs independently?: Yes  Ambulates independently?: No  Level of ambulatory dependence: Assistance  Does patient use assisted devices?: Yes  Assisted Devices (DME) used: Walker, Straight Cane, Wheelchair, Shower Chair, Hospital Bed (Yue chair, raised toilet seat, grab bars.)  Does patient currently own DME?: Yes  What DME does the patient currently own?: Hospital Bed, Shower Chair, Straight Cane, Walker, Wheelchair (Gerrichair, raised toilet seats, grab bards)  Does patient have a history of Outpatient Therapy (PT/OT)?: Yes (Current with [facility] outpatient PTOT)  Does the patient have a history of Short-Term Rehab?: Yes ([facility]. [facility])  Does patient have a history of HHC?: Yes ([place])  Does patient currently have HHC?: No         Patient Information Continued  Income Source: SSI/SSD  Does patient have prescription coverage?: Yes  Does patient receive dialysis treatments?: No  Does patient have a history of substance abuse?: No  Does patient have a  history of Mental Health Diagnosis?: No         Means of Transportation  Means of Transport to Appts:: Family transport          DISCHARGE DETAILS:    Discharge planning discussed with:: Pt and spouse, Faiza  Freedom of Choice: Yes     CM contacted family/caregiver?: Yes  Were Treatment Team discharge recommendations reviewed with patient/caregiver?: Yes  Did patient/caregiver verbalize understanding of patient care needs?: Yes  Were patient/caregiver advised of the risks associated with not following Treatment Team discharge recommendations?: Yes    Contacts  Patient Contacts: Faiza Zhu, spouse  Relationship to Patient:: Family  Contact Method: Phone  Phone Number: [phone]  Reason/Outcome: Emergency Contact, Discharge Planning, Referral    Requested Home Health Care         Is the patient interested in HHC at discharge?: No    DME Referral Provided  Referral made for DME?: No    Other Referral/Resources/Interventions Provided:  Interventions: Outpatient OT, Outpatient PT, Outpatient ST  Referral Comments: Per spouse, pt follows with outpatient PTOT and ST through [facility]. Made SLIM aware.         Treatment Team Recommendation: Home  Discharge Destination Plan:: Home                                IMM Given (Date):: 03/10/25  IMM Given to:: Family  Family notified:: Faiza  IMM reviewed with spouse. Aware of right to appeal discharge.     Notification made to OP CM Handoff: [facility] OP CM regarding discharge planning and disposition.    Additional Comments: CM met with pt at bedside and called spouse to discern discharge needs. Pt lives with spouse in a 2sh, 2ste, 1st floor setup. Per spouse, pt walks with walker short distances and uses a WC for longer distances. Independent with ADLs PTA. Pt uses and owns cane, walker, Wheelchair, shower chair, grab bars, hospital bed, raised toilet seat, and Lify chair. Spouse reports hx of STR, HHC, but not current with either. Current with outpatient OTPT and ST.  Preference is to continue with outpatient PTOT and ST. No MH or D&A treatment. Family provides transport. Spouse is medical and financial POA. Preferred pharamcy is [facility] in [place].

## 2025-03-11 ENCOUNTER — TELEPHONE (OUTPATIENT)
Dept: NEUROLOGY | Facility: CLINIC | Age: 65
End: 2025-03-11

## 2025-03-11 NOTE — UTILIZATION REVIEW
NOTIFICATION OF ADMISSION DISCHARGE   This is a Notification of Discharge from [facility]. Please be advised that this patient has been discharge from our facility. Below you will find the admission and discharge date and time including the patient’s disposition.   UTILIZATION REVIEW CONTACT:  Marina Antunez  Utilization   Network Utilization Review Department  Phone: [phone] x carefully listen to the prompts. All voicemails are confidential.  Email: [email or web address]     ADMISSION INFORMATION  PRESENTATION DATE: 3/8/2025 10:53 AM  OBERVATION ADMISSION DATE: N/A  INPATIENT ADMISSION DATE: 3/8/25  2:28 PM   DISCHARGE DATE: 3/10/2025  4:33 PM   DISPOSITION:Home/Self Care    Network Utilization Review Department  ATTENTION: Please call with any questions or concerns to [phone] and carefully listen to the prompts so that you are directed to the right person. All voicemails are confidential.   For Discharge needs, contact Care Management DC Support Team at [phone] opt. 2  Send all requests for admission clinical reviews, approved or denied determinations and any other requests to dedicated fax number below belonging to the campus where the patient is receiving treatment. List of dedicated fax numbers for the Facilities:  FACILITY NAME UR FAX NUMBER   ADMISSION DENIALS (Administrative/Medical Necessity) [phone]   DISCHARGE SUPPORT TEAM ([facility]) [phone]   PARENT CHILD HEALTH (Maternity/NICU/Pediatrics) [phone]   [facility] [phone]   [facility] [phone]   [facility] [phone]   [facility] [phone]   [facility] [phone]   [facility] [phone]   [facility] [phone]   [facility] [phone]    [facility] [phone]   [facility] [phone]   [facility] [phone]   [facility] [phone]

## 2025-03-11 NOTE — TELEPHONE ENCOUNTER
Pt was discharged 3/10/25 and has an OV scheduled with the provider today. Called pt's wife Faiza and left her a message on voice mail advising that the provider was updated on pt's current condition and the pt has an OV scheduled today with Dr. Steele at 3:30 pm.

## 2025-03-12 NOTE — TELEPHONE ENCOUNTER
Left message for patient's wife to call us back regarding Constanza's recent hospitalization. Upon reviewing records, patient was started on oxcarbazepine 300mg bid when he was seen in the ED. Levetiracetam was continued at the current dose.

## 2025-03-13 ENCOUNTER — TELEPHONE (OUTPATIENT)
Age: 65
End: 2025-03-13

## 2025-03-13 ENCOUNTER — OFFICE VISIT (OUTPATIENT)
Dept: NEUROLOGY | Facility: CLINIC | Age: 65
End: 2025-03-13
Payer: COMMERCIAL

## 2025-03-13 VITALS
SYSTOLIC BLOOD PRESSURE: 142 MMHG | BODY MASS INDEX: 31.96 KG/M2 | OXYGEN SATURATION: 99 % | HEIGHT: 66 IN | HEART RATE: 77 BPM | TEMPERATURE: 96.2 F | DIASTOLIC BLOOD PRESSURE: 86 MMHG

## 2025-03-13 DIAGNOSIS — R56.9 SEIZURES (HCC): ICD-10-CM

## 2025-03-13 DIAGNOSIS — R56.9 SEIZURE (HCC): Primary | ICD-10-CM

## 2025-03-13 DIAGNOSIS — Z95.828 INTERNAL CAROTID ARTERY STENT PRESENT: ICD-10-CM

## 2025-03-13 DIAGNOSIS — I63.9 RECURRENT STROKES (HCC): ICD-10-CM

## 2025-03-13 PROCEDURE — 99215 OFFICE O/P EST HI 40 MIN: CPT | Performed by: PSYCHIATRY & NEUROLOGY

## 2025-03-13 NOTE — PROGRESS NOTES
"Name: Constanza Zhu      : 1960      MRN: [id number]  Encounter Provider: Maddie Steele MD  Encounter Date: 3/13/2025   Encounter department: [facility] [place]  :  Assessment & Plan  Seizure (HCC)  Likely related to his strokes  He had another event this past weekend, and was taken to the ER and wife is concerned and had several questions regarding seizures  Continue with current management  No further events since discharge  Patient has an appt with Dr. Brasher in April so will defer further management to seizure team.        Recurrent strokes (HCC)  S/p bilateral carotid artery stent  Secondary Prevention -   -for medications - recommend continuation of combination of aspirin, brilinta and atorvastatin  -Blood Pressure goal < 130/80, BP is at goal  -LDL goal <70, last LDL is at goal      Counseling/stroke education -   -I advised patient to avoid using NSAIDs for headaches or other pain and to stick to tylenol if needed  -Recommend lifestyle modifications such as mediterranean diet & regular exercise regimen atleast 4-5 times a week for 20-30 minutes.   -I educated patient/family regarding medication compliance  -encourage control of diabetes and hypertension; defer management to primary        Internal carotid artery stent present  S/p bilateral ICA stents  Continue with aspirin, brilinta and atorvastatin       Seizures (HCC)           Follow up in 3 months     I would be happy to see the patient sooner if any new questions/concerns arise.  Patient/Guardian was advised to the call the office if they have any questions and concerns in the meantime.     We discussed \"red flag\" headache symptoms including symptoms such as facial droop on one side, weakness/paralysis on either side, speech trouble, numbness on one side, balance issues, any vision changes, extreme dizziness or significant increase in severity of headache or a sudden onset severe headache, patient is to call  " "immediately or to proceed to the nearest ER.           History of Present Illness   HPI     This is a 64 y/o  Male who is here as a follow up for stroke, and seizures.     He has episode this past Saturday and he was in the recliner, and he had to put his hand on his shoulder, and his pupils are pinpoint, and he is not aware of his symptoms. His pupils dilated, and then he gets tired for 5-10 minutes and then he goes back and his head is stiff as a board, and then she called 9-1-1 and they took him to the ER. He was then added trileptal 300mg PO BID and keppra was continued at the current dose. Wife is nervous about his events and has a lot of questions regarding seizures during todays visit     Patient denies any new TIA/CVA like symptoms and is compliant with his medications and is tolerating them well without any issues. He is tolerating aspirin, brilinta without any issues. He is in PT and OT and ST now. Ambulates with a walker/cane at home.       Review of Systems   Constitutional: Negative.    HENT: Negative.     Eyes: Negative.    Respiratory: Negative.     Cardiovascular: Negative.    Gastrointestinal: Negative.    Endocrine: Negative.    Genitourinary: Negative.    Musculoskeletal: Negative.    Skin: Negative.    Allergic/Immunologic: Negative.    Neurological: Negative.    Hematological: Negative.    Psychiatric/Behavioral: Negative.     All other systems reviewed and are negative.   I have personally reviewed the MA's review of systems and made changes as necessary.         Objective   /86 (Patient Position: Sitting, Cuff Size: Standard)   Pulse 77   Temp (!) 96.2 °F (35.7 °C) (Temporal)   Ht 5' 6\" (1.676 m)   SpO2 99%   BMI 31.96 kg/m²     Physical Exam  General - patient is alert   Speech - no dysarthria noted, no aphasia noted.     Neuro:   Cranial nerves: PERRL, EOMI, facial sensation intact to soft touch in V1, V2 and V3, no facial asymmetry noted, uvula/palate midline, tongue " midline.   Motor: 4/5 throughout on the RUE and RLE, but 5/5 LUE and LLE, normal tone, no pronator drift noted.   Sensory - intact to soft touch throughout  Reflexes - 2+ throughout  Coordination - no ataxia/dysmetria noted  Gait - ambulates with a walker.           Administrative Statements   I have spent a total time of 40 minutes in caring for this patient on the day of the visit/encounter including Risks and benefits of tx options, Instructions for management, Counseling / Coordination of care, Documenting in the medical record, Reviewing/placing orders in the medical record (including tests, medications, and/or procedures), Obtaining or reviewing history  , and Communicating with other healthcare professionals .

## 2025-03-13 NOTE — TELEPHONE ENCOUNTER
Pt's wife called in, pt was scheduled to see Dr. Steele on the 11th however they were called while they were on the way to the appt and told that the doctor had to leave due to an emergency and that she would see them instead today at 3 pm however there is no documentation of this and he was never scheduled for today at 3pm. Pt's wife is upset she said they took off work on the 11th and today and now the appt was never rescheduled. They are requesting to speak with an  urgently. She stated they are not going to be able to take another day off and pt needs to be seen today.

## 2025-03-13 NOTE — ASSESSMENT & PLAN NOTE
S/p bilateral carotid artery stent  Secondary Prevention -   -for medications - recommend continuation of combination of aspirin, brilinta and atorvastatin  -Blood Pressure goal < 130/80, BP is at goal  -LDL goal <70, last LDL is at goal      Counseling/stroke education -   -I advised patient to avoid using NSAIDs for headaches or other pain and to stick to tylenol if needed  -Recommend lifestyle modifications such as mediterranean diet & regular exercise regimen atleast 4-5 times a week for 20-30 minutes.   -I educated patient/family regarding medication compliance  -encourage control of diabetes and hypertension; defer management to primary

## 2025-03-14 NOTE — TELEPHONE ENCOUNTER
Returned call to patient's wife, call went immediately to voicemail. Left message to call us back.

## 2025-03-14 NOTE — TELEPHONE ENCOUNTER
Patient's spouse Faiza calling back after misseed call from Provider  today to discuss the patient current condition and frequent Seizure     Patient spouse asking for a Call  Back ASAP as they feel they are being pushed around and not looking to find a solution to the patient issues and just want to DX as Dementia

## 2025-03-18 NOTE — PROGRESS NOTES
[facility]  [address], [address]  [place] [address]  [phone]      GERIATRIC EVALUATION - ASSESSMENT & PLAN:      1. Moderate vascular dementia with other behavioral disturbance (HCC)  Assessment & Plan:  Consistent with vascular dementia due to hx of multiple CVAs, and recurrent seizures. Concerns for possible frontotemporal dementia due to increase behaviors, tremors, and recent imaging results.   Staging: Moderate Vascular dementia vs mixed vascular and frontotemporal dementia   Decision-making capacity: May have limitations in complex medical or financial decisions.  Consider neuropsychology evaluation for further determination  Caregiver Review: SW discussed with wife various resources prior to visit, resource packet given at visit.   Safety:  Medication Review: Medications reviewed, some medications on list may cause increased confusion and falls. But these medications are used to manage chronic conditions, risks outweigh benefits at this time.   Driving: Not driving.  Fall Risk: High fall risk  ACP Review: POA and Living Will in place.   Will order labs, PET CT for further evaluation to r/o frontotemporal dementia due to recent findings on CT.   Plan to follow up at scheduled care conference.   Orders:  -     NM PET CT Amyloid Brain; Future; Expected date: 04/24/2025  -     Ambulatory Referral to [facility]  -     Vitamin B12/Folate, Serum Panel; Future; Expected date: 04/24/2025  2. Recurrent strokes (HCC)  Assessment & Plan:  Hx of multiple CVAs, recently had BL carotid artery stents placed.   MRI on 3/8/2025 IMPRESSION - No acute intracranial abnormality. Chronic microangiopathic changes. Chronic infarcts are noted.   Concerns due to increased behaviors and noted encephalomalacia in the frontal and temporal lobes, may be due to hx of multiple CVAs, but with increased behaviors may be correlated to frontotemporal dementia. Will order PET CT for further evaluation.    Continues on ASA, Brilinta and Atorvastatin for risk reduction.   Goal BP per neurology is <130/80.   Due to recurrent CVAs. Continues to see PT/OT/ST.    Educated to medication compliance, and close follow up with multiple specialities for risk reduction strategies and close management of chronic conditions.   3. Seizures (HCC)  Assessment & Plan:  Recently seen and evaluated in ED for AMS most likely related to seizure activity. Medications were recently adjusted.   Continue Keppra 1,500 mg BID, Trileptal 300 mg BID for seizure prevention.   Continue to follow closely with neurology for management.   4. Primary hypertension  Assessment & Plan:  Goal BP  <130/80.   Continues on Hydrochlorothiazide, Metoprolol for BP management.   5. Urinary retention  Assessment & Plan:  Hx of BPH with urinary retention.   Continues on Flomax 0.4 mg for symptom relief.       HPI     We had the pleasure of evaluating Constanza Zhu who is a 65 y.o. male in Geriatric consultation today, along with Faiza his wife, to provide further history. Has been referred due to ongoing memory changes and increased agitation/behaviors that have been outside the norm for patient. Some of these behaviors are noted around the timing of seizure onset. Has been followed consistently with neurology outpatient and inpatient for recommendations. Noted behaviors include agitation with hand tremors, loss of memory of events, and then somnolence. These episodes last 5 minutes of the agitation, then he forgets. Continues in PT/OT/ST.     COGNITION:  Memory Issues noticed since 6 year(s)    Memory affected: short term memory loss    Symptoms started: gradual  Over time the memory has: worsened  Memory issue(s) were noted by: patient and family   Difficulty finding the right word while speaking: Yes  Requires repeat information or asking the same question repeatedly: Yes  Fluctuation in alertness: Yes  Changes in mood or personality: Yes  Seems anxious:  "No  Seems depressed: Yes  With suicidal ideation: No    Current or previous treatment for depression or anxiety: Yes    Family member with dementia and what type? Yes - father   History of head trauma: No  History of stroke: Yes - multiple CVAs, with seizures.   History of alcohol or substance abuse: No    FUNCTIONAL STATUS:  BADLs  Does patient require assistance with: due to physical limitations d/t CVA   Bathing: Yes  Dressing: Yes  Transferring: Yes  Continence: Yes  Toileting: Yes  Feeding: No    IADLs  Does patient require assistance with:  Telephone: Yes  Shopping: Yes  Food Preparation: Yes  Housekeeping: Yes  Laundry: Yes  Transportation: Yes  Medications: Yes  Finances: Yes    NEUROPSYCH SYMPTOMS:  Is patient less interested in his or her usual activities or in the activities and plans of others? Yes  Does patient get angry or hostile?  Resist care from others? Yes - during episodes   Does patient act impatient and cranky? Does mood frequently change for no apparent reason? Yes  Does patient have trouble sleeping at night? No  Does patient see or hear things that no one else can see or hear? No  Does patient act suspicious without good reason (example: believes that others are stealing from him or her, or planning to harm him or her in some way)? No    SAFETY (per SW intake):    Is the patient still driving? Have they had any recent citations or accidents? No.  [facility] had submitted to [employer] and now patient just has ID  Is the patient taking medications as prescribed? Yes  Is there a system in place for medication management? Spouse manages medications and will put them in a cup for patient to take independently    Are there firearms or weapons in the home? No, they were removed     Recommendations per [employer]: removing them from the home, keep ammunition stored separately, encourage patient to consider \"gifting\" them, if necessary, ask local law enforcement for assistance in removing " the firearms from the home. The family may receive compensation from a gun buy-back program.     Does the patient use an assistive device? Yes  Do they have any difficulty with gait or balance?  Yes, uses a walker.  Also has a hospital bed and lift chair  Does the patient have difficulty with hearing or vision?  Since stroke has had issues with his vision (now 20/30 vision), has been seen by eye doctor.  Right side vision is worse than left.  No concerns with hearing     Any falls in the last year?  Occasional falls, yes.  They are working with patient on this in PT.  Most falls were due to patient trying to walk backwards or sideways.  No falls within the past 6 months but spouse reports patient to be a fall risk.     Any history of wandering? No    Does the patient live alone?  Home with spouse  What is the layout of the home: 2SH, but arranged for patient to have a first floor set up  Do you feel comfortable leaving the patient home alone?  Not really, but will leave him for short periods of time (about 2 hours), occasionally     Do you have any concerns about the patient's cooking or eating habits?  Spouse does cooking and meal prep  Do you have working smoke detectors and fire extinguishers in the home?  Yes, smoke detectors.  Unsure about fire extinguishers     Has the patient been involved in any scams?  Yes, but it was prior to his last stroke a few years ago     Have you thought about when it will no longer be safe for the patient to be left alone or any future planning if their memory were to decline and they have an increase in care needs? Family is very supportive of patient and care.  They do have home care services and would like resources for more home care options if needed.      Allergies   Allergen Reactions    Flexeril [Cyclobenzaprine] Drowsiness    Lisinopril Cough    Nsaids Confusion       Medications:    Current Outpatient Medications on File Prior to Visit   Medication Sig Dispense Refill     acetaminophen (TYLENOL) 500 mg tablet Take 500 mg by mouth as needed for mild pain      Amantadine HCl 100 MG tablet Take 100 mg by mouth 2 (two) times a day      aspirin 81 mg chewable tablet Chew 81 mg daily      atorvastatin (LIPITOR) 40 mg tablet Take 40 mg by mouth daily with breakfast      Baclofen 5 MG TABS Take 5 mg by mouth 2 (two) times a day as needed (spasm) (Patient taking differently: Take 5 mg by mouth 2 (two) times a day)      donepezil (ARICEPT) 10 mg tablet Take 1 tablet (10 mg total) by mouth in the morning 60 tablet 2    hydroCHLOROthiazide 12.5 mg capsule Take 12.5 mg by mouth daily      levETIRAcetam (Keppra) 750 mg tablet Take 2 tablets (1,500 mg total) by mouth every 12 (twelve) hours      lidocaine (LIDODERM) 5 % Apply 1 patch topically 2 (two) times a day Remove & Discard patch within 12 hours or as directed by MD      Melatonin 5 MG CAPS Take 5 mg by mouth daily      metoprolol succinate (TOPROL-XL) 50 mg 24 hr tablet Take 50 mg by mouth daily      mirtazapine (REMERON) 7.5 MG tablet Take 7.5 mg by mouth daily at bedtime      OXcarbazepine (TRILEPTAL) 300 mg tablet Take 1 tablet (300 mg total) by mouth every 12 (twelve) hours 60 tablet 0    pantoprazole (PROTONIX) 20 mg tablet Take 20 mg by mouth daily      QUEtiapine (SEROquel) 25 mg tablet Take 1 tablet (25 mg total) by mouth daily at bedtime 30 tablet 0    tamsulosin (FLOMAX) 0.4 mg Take 1 capsule (0.4 mg total) by mouth daily with dinner 90 capsule 3    ticagrelor (BRILINTA) 90 MG Take 1 tablet (90 mg total) by mouth every 12 (twelve) hours 60 tablet 3     No current facility-administered medications on file prior to visit.       History:  Past Medical History:   Diagnosis Date    CTS (carpal tunnel syndrome) 4/2003    Depression     Diabetes mellitus (HCC)     patient denies    Dyslipidemia 03/26/2019    GERD (gastroesophageal reflux disease)     HL (hearing loss) 1/2005    Hyperlipidemia     Hypertension     Memory loss 3/2019     Prediabetes     Prediabetes 04/03/2019    Stroke (HCC)      Past Surgical History:   Procedure Laterality Date    ARTHROSCOPIC REPAIR ACL Left     BACK SURGERY      twice    CARPAL TUNNEL RELEASE Right     IR CEREBRAL ANGIOGRAPHY  05/04/2023    IR STROKE ALERT  03/19/2019    SHOULDER SURGERY Left     US GUIDED THYROID BIOPSY  11/21/2023     Family History   Problem Relation Age of Onset    Hyperlipidemia Mother     Hypertension Mother     Diabetes unspecified Mother         prediabetes    Heart attack Mother     Diabetes Mother     Hyperlipidemia Father     Hypertension Father     Prostate cancer Father     Stroke Father     Hyperlipidemia Sister     Hypertension Sister     Hyperlipidemia Sister     Hypertension Sister     Depression Sister     Hypertension Sister     Hyperlipidemia Sister     Depression Sister     Hyperlipidemia Brother     Hypertension Brother     Hypertension Brother     Prostate cancer Brother     Hypothyroidism Daughter     Hypothyroidism Son     Diabetes unspecified Son     Seizures Neg Hx      Social History     Socioeconomic History    Marital status: /Civil Union     Spouse name: Not on file    Number of children: Not on file    Years of education: Not on file    Highest education level: Not on file   Occupational History    Not on file   Tobacco Use    Smoking status: Former     Passive exposure: Past    Smokeless tobacco: Never   Vaping Use    Vaping status: Never Used   Substance and Sexual Activity    Alcohol use: Never    Drug use: Never    Sexual activity: Not Currently   Other Topics Concern    Not on file   Social History Narrative    Lives in [place] with wife     Social Drivers of Health     Financial Resource Strain: Not on file   Food Insecurity: Patient Unable To Answer (3/8/2025)    Nursing - Inadequate Food Risk Classification     Worried About Running Out of Food in the Last Year: Never true     Ran Out of Food in the Last Year: Never true     Ran Out of Food  in the Last Year: Patient unable to answer   Transportation Needs: Patient Unable To Answer (3/8/2025)    Nursing - Transportation Risk Classification     Lack of Transportation: Not on file     Lack of Transportation: Patient unable to answer   Physical Activity: Not on file   Stress: Not on file   Social Connections: Not on file   Intimate Partner Violence: Patient Unable To Answer (3/8/2025)    Nursing IPS     Feels Physically and Emotionally Safe: Not on file     Physically Hurt by Someone: Not on file     Humiliated or Emotionally Abused by Someone: Not on file     Physically Hurt by Someone: Patient unable to answer     Hurt or Threatened by Someone: Patient unable to answer   Housing Stability: Patient Unable To Answer (3/8/2025)    Nursing: Inadequate Housing Risk Classification     Has Housing: Not on file     Worried About Losing Housing: Not on file     Unable to Get Utilities: Not on file     Unable to Pay for Housing in the Last Year: Patient unable to answer     Has Housin     Past Surgical History:   Procedure Laterality Date    ARTHROSCOPIC REPAIR ACL Left     BACK SURGERY      twice    CARPAL TUNNEL RELEASE Right     IR CEREBRAL ANGIOGRAPHY  2023    IR STROKE ALERT  2019    SHOULDER SURGERY Left     US GUIDED THYROID BIOPSY  2023         SUBJECTIVE     Review of Systems   Constitutional:  Positive for activity change.   HENT:  Positive for hearing loss.    Eyes:  Positive for visual disturbance.   Musculoskeletal:  Positive for arthralgias, back pain and gait problem (walker and wheelchair).   Neurological:  Positive for dizziness, speech difficulty and weakness (left sided).   Psychiatric/Behavioral:  Positive for dysphoric mood.    All other systems reviewed and are negative.      OBJECTIVE     Vitals:  Vitals:    25 1347   BP: 126/70   Pulse: 84   Temp: 97.8 °F (36.6 °C)   SpO2: 98%        Physical Exam  Observed Ambulation:  walker and wheelchair   Physical  Exam  Constitutional:       General: He is not in acute distress.     Appearance: Normal appearance. He is normal weight. He is not ill-appearing.   HENT:      Head: Normocephalic and atraumatic.   Cardiovascular:      Rate and Rhythm: Normal rate and regular rhythm.      Pulses: Normal pulses.      Heart sounds: Normal heart sounds. No murmur heard.  Pulmonary:      Effort: Pulmonary effort is normal. No respiratory distress.      Breath sounds: Normal breath sounds. No wheezing.   Abdominal:      General: Bowel sounds are normal. There is no distension.      Palpations: Abdomen is soft.      Tenderness: There is no abdominal tenderness.   Musculoskeletal:         General: Normal range of motion.      Cervical back: Normal range of motion and neck supple.   Skin:     General: Skin is warm and dry.      Capillary Refill: Capillary refill takes less than 2 seconds.   Neurological:      General: No focal deficit present.      Mental Status: He is alert. Mental status is at baseline.      Motor: Weakness (left sided d/t CVA) present.      Gait: Gait abnormal.      Comments: Alert and oriented to person, place   Psychiatric:         Mood and Affect: Mood normal.         Behavior: Behavior normal.         MoCA: unable to complete   GDS: 0/15    Labs & Imaging:  Lab Results   Component Value Date    CGJRQFJM42 864 04/17/2023     Lab Results   Component Value Date    FJP5EACNHIWP 1.954 12/24/2024    KYQ2GBPFBNIK 1.876 12/24/2024     Results for orders placed during the hospital encounter of 03/08/25    CT head without contrast    Narrative  CT BRAIN - WITHOUT CONTRAST    INDICATION:   Altered mental status.    COMPARISON: December 24, 2024    TECHNIQUE:  CT examination of the brain was performed.  Multiplanar 2D reformatted images were created from the source data.    Radiation dose length product (DLP) for this visit:  905.84 mGy-cm .  This examination, like all CT scans performed in the [facility], was  performed utilizing techniques to minimize radiation dose exposure, including the use of iterative  reconstruction and automated exposure control.    IMAGE QUALITY:  Diagnostic.    FINDINGS:    PARENCHYMA: Encephalomalacia identified left posterior temporo-occipital junction compatible with chronic infarct. Encephalomalacia right superolateral frontal cortex also noted similar to prior compatible with chronic infarct. Low-density  periventricular white matter compatible with moderate chronic microangiopathic change.    VENTRICLES AND EXTRA-AXIAL SPACES:  Ventricles and extra-axial CSF spaces are prominent commensurate with the degree of volume loss.  No hydrocephalus.  No acute extra-axial hemorrhage.    VISUALIZED ORBITS:  Normal visualized orbits.    PARANASAL SINUSES:  Normal visualized paranasal sinuses.    CALVARIUM AND EXTRACRANIAL SOFT TISSUES:  Normal.    Impression  No acute intracranial abnormality. Chronic microangiopathic changes. Chronic infarcts are noted.        Workstation performed: [id number]          I have spent 60 minutes with Patient and family today including taking history from family, patient, review of records, charting, and counseling regarding diagnosis and management of conditions.      ERIC Gold  03/24/25

## 2025-03-18 NOTE — ASSESSMENT & PLAN NOTE
Recently seen and evaluated in ED for AMS most likely related to seizure activity. Medications were recently adjusted.   Continue Keppra 1,500 mg BID, Trileptal 300 mg BID for seizure prevention.   Continue to follow closely with neurology for management.

## 2025-03-18 NOTE — ASSESSMENT & PLAN NOTE
Hx of multiple CVAs, recently had BL carotid artery stents placed.   MRI on 3/8/2025 IMPRESSION - No acute intracranial abnormality. Chronic microangiopathic changes. Chronic infarcts are noted.   Concerns due to increased behaviors and noted encephalomalacia in the frontal and temporal lobes, may be due to hx of multiple CVAs, but with increased behaviors may be correlated to frontotemporal dementia. Will order PET CT for further evaluation.   Continues on ASA, Brilinta and Atorvastatin for risk reduction.   Goal BP per neurology is <130/80.   Due to recurrent CVAs. Continues to see PT/OT/ST.    Educated to medication compliance, and close follow up with multiple specialities for risk reduction strategies and close management of chronic conditions.

## 2025-03-20 ENCOUNTER — TELEPHONE (OUTPATIENT)
Age: 65
End: 2025-03-20

## 2025-03-20 NOTE — TELEPHONE ENCOUNTER
Patients wife called back to confirm 3/24/25 appt. Attempted to contact CV, unable to connect. Faiza stated she would be available for a phone call from 1:30-2:30 today if a SW could call her.     Please advise, thank you.

## 2025-03-20 NOTE — TELEPHONE ENCOUNTER
"[facility]  [address], [address]  [place] [address]  [phone]    Social Work Intake    MSW spoke with patient's spouse, Faiza, to complete social work intake via phone, for patient's upcoming geriatric assessment on 03/24/2025 with ERIC Lyon.     Social/Family History   Marital status:                                        Spouse's Name:  Faiza     Does patient have children?  Son in [place] and daughter in [place]  (If yes, where do the children live?)   Family members assisting patient at home: Faiza    Who is available to provide care in case of illness or crisis? Faiza/POA       Employment and Education   Education: Highest Level of Education: Associate's Degree    Retired: Yes   Type of employment: Electronic    [place]: No    Benefits (if applicable): N/A     Lifestyle/Community Services   Clubs and Organizations: No, mostly due to medical    Have you ever used any community-based services (ex. homecare, waiver services, meal delivery service, or respite care?): Yes.  Caregivers in 3 days/week and can increase if needed.  If spouse needs to go away, children will support and be with patient   If \"no\" are you interested in information about these services? Resources would be        Financial (info assists with future planning options; not required)  Total Monthly income: Not disclosed     Source of income: Social Security and Pension  Meet current needs? Yes  - spouse manages all finances/bills  Funds available for home care?  Currently has home care, but finances are limited     Funds available for nursing home? No    Do you rent or own your home? Own       ACP REVIEW:  Does patient have POA: Yes - spouseFaiza  Does patient have a Living will: Yes  Any legal assistance needed for healthcare planning?: No    For all patients, we encourage seeing a  to establish a Financial Power of , a Health Care Power of , " "and an Advanced Directive (living will). Particularly for patients experiencing memory concerns, we strongly recommend that this is completed as soon as possible.     Safety Assessment  Is the patient still driving? Have they had any recent citations or accidents? No.  [facility] had submitted to [place] and now patient just has ID  Is the patient taking medications as prescribed? Yes  Is there a system in place for medication management? Spouse manages medications and will put them in a cup for patient to take independently    Are there firearms or weapons in the home? No, they were removed     Recommendations per [employer]: removing them from the home, keep ammunition stored separately, encourage patient to consider \"gifting\" them, if necessary, ask local law enforcement for assistance in removing the firearms from the home. The family may receive compensation from a gun buy-back program.    Does the patient use an assistive device? Yes  Do they have any difficulty with gait or balance?  Yes, uses a walker.  Also has a hospital bed and lift chair  Does the patient have difficulty with hearing or vision?  Since stroke has had issues with his vision (now 20/30 vision), has been seen by eye doctor.  Right side vision is worse than left.  No concerns with hearing     Any falls in the last year?  Occasional falls, yes.  They are working with patient on this in PT.  Most falls were due to patient trying to walk backwards or sideways.  No falls within the past 6 months but spouse reports patient to be a fall risk.     Any history of wandering? No    Does the patient live alone?  Home with spouse  What is the layout of the home: 2SH, but arranged for patient to have a first floor set up  Do you feel comfortable leaving the patient home alone?  Not really, but will leave him for short periods of time (about 2 hours), occasionally     Do you have any concerns about the patient's cooking or eating habits?  Spouse " does cooking and meal prep  Do you have working smoke detectors and fire extinguishers in the home?  Yes, smoke detectors.  Unsure about fire extinguishers     Has the patient been involved in any scams?  Yes, but it was prior to his last stroke a few years ago     Have you thought about when it will no longer be safe for the patient to be left alone or any future planning if their memory were to decline and they have an increase in care needs? Family is very supportive of patient and care.  They do have home care services and would like resources for more home care options if needed.     Stoke about 6 years ago, and a few years ago a series of debilitating strokes and is mostly in home bound at this time    Was not happy with care at subacute care facilities and are more pleased with being home and home care    Currently in OP therapy (PT, OT, ST) with [facility]    Spouse says he has Vascular Dementia per visit with [facility] on 8/31/24    Talked about the behavioral outburst per ED visits - spouse states this was not dementia outburst but a neurological outburst.  When the episode was over, he could not recall what had happened and then 5 minutes after went unresponsive - Very upset with ED visits because they kept saying patient had dementia and should be placed.  Family does not feel this is all it is and does not want placement. Has filed formal complaints against Providers at hospital.    Has gone through a series of multiple drug changes and is worried this has impacted things - would like review of medications     Spouse reports she is getting frustrated     Behaviors: throw things, bang his walker    Education/resources to be provided to patient/family at the visit or via mail:   The following resources will be provided during visit:  -Home care, Waiver, AAA  -OPTIONS Program  -Steps to Apply for Waiver/Resource list  -Care Patrol brochure  -LVAIP Resource Guide  -Cognitive Sonia list  -Fall Alert  device  -Transportation Options  -[facility] Grandparent brochure

## 2025-03-20 NOTE — TELEPHONE ENCOUNTER
MSW attempted outreach to patient's spouse, Faiza, to confirm patient's appointment on 3/24/25 and to complete SW Intake.  However, Faiza was not available at time of call.  Voicemail left, with MSW's direct callback information, to confirm appointment and request return call to complete SW Intake.  MSW will remain available as needed.

## 2025-03-24 ENCOUNTER — APPOINTMENT (OUTPATIENT)
Dept: LAB | Facility: CLINIC | Age: 65
End: 2025-03-24
Payer: COMMERCIAL

## 2025-03-24 ENCOUNTER — CONSULT (OUTPATIENT)
Age: 65
End: 2025-03-24
Payer: COMMERCIAL

## 2025-03-24 VITALS
WEIGHT: 215 LBS | OXYGEN SATURATION: 98 % | SYSTOLIC BLOOD PRESSURE: 126 MMHG | BODY MASS INDEX: 34.55 KG/M2 | DIASTOLIC BLOOD PRESSURE: 70 MMHG | HEIGHT: 66 IN | HEART RATE: 84 BPM | TEMPERATURE: 97.8 F

## 2025-03-24 DIAGNOSIS — I10 PRIMARY HYPERTENSION: ICD-10-CM

## 2025-03-24 DIAGNOSIS — R56.9 SEIZURES (HCC): ICD-10-CM

## 2025-03-24 DIAGNOSIS — F01.B18 MODERATE VASCULAR DEMENTIA WITH OTHER BEHAVIORAL DISTURBANCE (HCC): Primary | ICD-10-CM

## 2025-03-24 DIAGNOSIS — R33.9 URINARY RETENTION: ICD-10-CM

## 2025-03-24 DIAGNOSIS — F01.B18 MODERATE VASCULAR DEMENTIA WITH OTHER BEHAVIORAL DISTURBANCE (HCC): ICD-10-CM

## 2025-03-24 DIAGNOSIS — I63.9 RECURRENT STROKES (HCC): ICD-10-CM

## 2025-03-24 LAB
FOLATE SERPL-MCNC: 16 NG/ML
VIT B12 SERPL-MCNC: 355 PG/ML (ref 180–914)

## 2025-03-24 PROCEDURE — 36415 COLL VENOUS BLD VENIPUNCTURE: CPT

## 2025-03-24 PROCEDURE — 82746 ASSAY OF FOLIC ACID SERUM: CPT

## 2025-03-24 PROCEDURE — 99205 OFFICE O/P NEW HI 60 MIN: CPT

## 2025-03-24 PROCEDURE — 82607 VITAMIN B-12: CPT

## 2025-03-24 RX ORDER — LIDOCAINE 50 MG/G
1 PATCH TOPICAL 2 TIMES DAILY
COMMUNITY

## 2025-03-24 NOTE — ASSESSMENT & PLAN NOTE
Consistent with vascular dementia due to hx of multiple CVAs, and recurrent seizures. Concerns for possible frontotemporal dementia due to increase behaviors, tremors, and recent imaging results.   Staging: Moderate Vascular dementia vs mixed vascular and frontotemporal dementia   Decision-making capacity: May have limitations in complex medical or financial decisions.  Consider neuropsychology evaluation for further determination  Caregiver Review: SW discussed with wife various resources prior to visit, resource packet given at visit.   Safety:  Medication Review: Medications reviewed, some medications on list may cause increased confusion and falls. But these medications are used to manage chronic conditions, risks outweigh benefits at this time.   Driving: Not driving.  Fall Risk: High fall risk  ACP Review: POA and Living Will in place.   Will order labs, PET CT for further evaluation to r/o frontotemporal dementia due to recent findings on CT.   Plan to follow up at scheduled care conference.

## 2025-03-31 ENCOUNTER — TELEPHONE (OUTPATIENT)
Age: 65
End: 2025-03-31

## 2025-03-31 DIAGNOSIS — R56.9 SEIZURES (HCC): ICD-10-CM

## 2025-03-31 RX ORDER — OXCARBAZEPINE 300 MG/1
300 TABLET, FILM COATED ORAL EVERY 12 HOURS SCHEDULED
Qty: 60 TABLET | Refills: 11 | Status: SHIPPED | OUTPATIENT
Start: 2025-03-31 | End: 2025-04-08 | Stop reason: SDUPTHER

## 2025-03-31 NOTE — TELEPHONE ENCOUNTER
03/31/25    Patient Wife Miss. Kendall called office today with 2 requests, and they are the following:    Miss. Kendall would like to get a call back from Kana Sullivan.    Miss. Kendall did not share subject of the requested call.    And second, Miss. Kendall is requesting if Neurology Provider can Prescribed at least 10-pills to hold patient off until his Neurology Appt 04/08/25 with Dr. Arevalo.    Miss. Kendall is concerned that if patient stops taking his Seizure medication would have a negative effect on patient.    Medication:   OXcarbazepine (TRILEPTAL) 300 mg tablet       Please review and contact Patient Wife.  Thank You.

## 2025-03-31 NOTE — TELEPHONE ENCOUNTER
Called Faiza to clarify how this nurse can better assist with her questions and concerns. Left a message on her voice mail requesting a return call.    Routed to Laurie Stoner to make her aware that the pt is requesting a return call from her. Routed to Dr. Steele to review the refill request.

## 2025-04-01 NOTE — TELEPHONE ENCOUNTER
Left message for patient's wife to call us back.     Completing this encounter as there are duplicate encounters opened.

## 2025-04-03 NOTE — TELEPHONE ENCOUNTER
Pts wife Faiza called asking about a refill on Oxcarbazepine stating the pt will run out soon. Faiza did stated that she keeps missing calls. Faiza stated she does work and has back to back meetings so she can not answer calls. Afternoons are better for her as she has less meeting.      Faiza was informed that Oxcarbazepine refill completed. Provided pharmacy information of:      Pharmacy    [id number] - [place], [place] - [address] [place], [place] [address]ERIC PEARSON[address] [address]

## 2025-04-08 ENCOUNTER — OFFICE VISIT (OUTPATIENT)
Dept: NEUROLOGY | Facility: CLINIC | Age: 65
End: 2025-04-08
Payer: COMMERCIAL

## 2025-04-08 VITALS
WEIGHT: 216 LBS | HEIGHT: 66 IN | OXYGEN SATURATION: 94 % | DIASTOLIC BLOOD PRESSURE: 84 MMHG | BODY MASS INDEX: 34.72 KG/M2 | SYSTOLIC BLOOD PRESSURE: 136 MMHG | HEART RATE: 107 BPM

## 2025-04-08 DIAGNOSIS — I69.359 HEMIPLEGIA AND HEMIPARESIS FOLLOWING CEREBRAL INFARCTION AFFECTING UNSPECIFIED SIDE (HCC): ICD-10-CM

## 2025-04-08 DIAGNOSIS — G40.109 FOCAL EPILEPSY (HCC): Primary | ICD-10-CM

## 2025-04-08 DIAGNOSIS — F32.A ANXIETY AND DEPRESSION: ICD-10-CM

## 2025-04-08 DIAGNOSIS — F41.9 ANXIETY AND DEPRESSION: ICD-10-CM

## 2025-04-08 PROCEDURE — 99214 OFFICE O/P EST MOD 30 MIN: CPT | Performed by: PSYCHIATRY & NEUROLOGY

## 2025-04-08 RX ORDER — LEVETIRACETAM 500 MG/1
1500 TABLET ORAL EVERY 12 HOURS SCHEDULED
Qty: 540 TABLET | Refills: 3 | Status: SHIPPED | OUTPATIENT
Start: 2025-04-08

## 2025-04-08 RX ORDER — OXCARBAZEPINE 300 MG/1
450 TABLET, FILM COATED ORAL EVERY 12 HOURS SCHEDULED
Qty: 270 TABLET | Refills: 3 | Status: SHIPPED | OUTPATIENT
Start: 2025-04-08

## 2025-04-08 NOTE — PATIENT INSTRUCTIONS
-- continue to take Levetiracetam 1500 mg twice a day     -- I will plan to increase your Oxcarbazepine to be 1.5 tabs (450 mg) twice a day.     -- I will refer him to see psychiatry to help with his depression.

## 2025-04-08 NOTE — PROGRESS NOTES
Name: Constanza Zhu      : 1960      MRN: [id number]  Encounter Provider: Mickey Brasher MD  Encounter Date: 2025   Encounter department: NEUROLOGY [facility]  :  Assessment & Plan  Focal epilepsy (HCC)  He has experienced some additional seizures since his last visit. He is now on Oxcarbazepine, which he appears to be tolerating well.  He is on a rather low dose of Oxcarbazepine, and I would recommend he increase it up to a minimally effective dose, as below. He is currently on maximally tolerated doses of Levetiracetam, so rubin need to continue this going forward.     -- he will continue Levetiracetam unchanged. I will have him increase Oxcarbazepine to be 450 mg twice a day.   Orders:    levETIRAcetam (Keppra) 500 mg tablet; Take 3 tablets (1,500 mg total) by mouth every 12 (twelve) hours    OXcarbazepine (TRILEPTAL) 300 mg tablet; Take 1.5 tablets (450 mg total) by mouth every 12 (twelve) hours    Anxiety and depression  He has been having more issued with depression and mood changes. We extensively discussed this along with the need to address his mental health. Overall, this often can be managed by a patient's PCP, but some patients may need to be seen by a psychiatrist and/or a psychologist. He was open to starting to work with a psychologist and with a psychiatrist, so I did place a referral for psych services.   Orders:    Ambulatory referral to Psych Services; Future        He will Return in about 3 months (around 2025).      History of Present Illness   HPI  Constanza Zhu is a 65 y.o. male with carotid stenosis, prior strokes, and watershed infarctions, also with focal epilepsy, who is returning to Neurology office for follow up of his seizures.     Current seizure medications:  1. Levetiracetam 1500 mg twice a day  2. Oxcarbazepine 300 mg twice a day.   Other medications as per Epic.    Since his last visit, he had two different events, one in December and one in March.  "With each event, he will appear angry, and will then say things that don't make sense, he will not focus on things during these times. This would last for about 4-5 minutes, then stop.  Afterwards, he is very tired and doesn't have any recollection of what occurs after the event. A little while after this, he will then lay back and be unresponsive. He will not interact or respond to noxious stimulation (even with EMS) for a total time of 10-20 min. His wife notes that he also appears very stiff all over when this occurs.     He as seen in the hospital because of his seizures and was started on Oxcarbazepine 300 mg twice a day.     Following his seizures, he was having more issues with right arm tremor. This was very prominent immediately after the seizure, but it gradually improved over time.     He has been having a lot more issues with depression. Last week, he did endorse some thoughts of harming himself, which was the first time his wife recalls him stating anything like that. They had discussed some of his symptoms with his PCP.     Prior Seizure Medications: none    His history was also obtained from his wife, who was present at today's visit.     Review of Systems  I personally reviewed the review of systems that was entered by the medical assistant in a separate note       Objective   /84 (BP Location: Left arm, Patient Position: Sitting, Cuff Size: Large)   Pulse (!) 107   Ht 5' 6\" (1.676 m)   Wt 98 kg (216 lb)   SpO2 94%   BMI 34.86 kg/m²      Physical Exam    Voice recognition software was used in the generation of this note. There may be unintentional errors including grammatical errors, spelling errors, or pronoun errors.      :    "

## 2025-04-08 NOTE — ASSESSMENT & PLAN NOTE
He has been having more issued with depression and mood changes. We extensively discussed this along with the need to address his mental health. Overall, this often can be managed by a patient's PCP, but some patients may need to be seen by a psychiatrist and/or a psychologist. He was open to starting to work with a psychologist and with a psychiatrist, so I did place a referral for psych services.   Orders:    Ambulatory referral to Psych Services; Future

## 2025-04-08 NOTE — ASSESSMENT & PLAN NOTE
He has experienced some additional seizures since his last visit. He is now on Oxcarbazepine, which he appears to be tolerating well.  He is on a rather low dose of Oxcarbazepine, and I would recommend he increase it up to a minimally effective dose, as below. He is currently on maximally tolerated doses of Levetiracetam, so rubin need to continue this going forward.     -- he will continue Levetiracetam unchanged. I will have him increase Oxcarbazepine to be 450 mg twice a day.   Orders:    levETIRAcetam (Keppra) 500 mg tablet; Take 3 tablets (1,500 mg total) by mouth every 12 (twelve) hours    OXcarbazepine (TRILEPTAL) 300 mg tablet; Take 1.5 tablets (450 mg total) by mouth every 12 (twelve) hours

## 2025-04-08 NOTE — PROGRESS NOTES
Review of Systems   Constitutional:  Negative for appetite change, fatigue and fever.   HENT: Negative.  Negative for hearing loss, tinnitus, trouble swallowing and voice change.    Eyes: Negative.  Negative for photophobia, pain and visual disturbance.   Respiratory: Negative.  Negative for shortness of breath.    Cardiovascular: Negative.  Negative for palpitations.   Gastrointestinal: Negative.  Negative for nausea and vomiting.   Endocrine: Negative.  Negative for cold intolerance.   Genitourinary: Negative.  Negative for dysuria, frequency and urgency.   Musculoskeletal:  Negative for back pain, gait problem, myalgias, neck pain and neck stiffness.   Skin: Negative.  Negative for rash.   Allergic/Immunologic: Negative.    Neurological:  Positive for tremors and seizures (last episode on 3/7/2025). Negative for dizziness, syncope, facial asymmetry, speech difficulty, weakness, light-headedness, numbness and headaches.   Hematological: Negative.  Does not bruise/bleed easily.   Psychiatric/Behavioral: Negative.  Negative for confusion, hallucinations and sleep disturbance.

## 2025-04-15 ENCOUNTER — TELEPHONE (OUTPATIENT)
Age: 65
End: 2025-04-15

## 2025-04-15 NOTE — TELEPHONE ENCOUNTER
Left message  w/spouse, Faiza regarding Care Conference appt for 5/29 @ 8:00 am. Appt was scheduled with wrong provider. R/S Care Conference appt w/correct provider.

## 2025-04-15 NOTE — TELEPHONE ENCOUNTER
Patient's PET scan was denied.  Wife would like an appeal started ASA.  If possible, a face to face can be done to get the appeal done faster because the patient has his PET scan appointment scheduled for Friday. Patients wife said patient has had MRI, CT scan, and xray of his brain in the past.     If his PET scan appointment is canceled, it will take 4 weeks to get him back in and he will have to reschedule his care conference appointment most likely.     The phone number for [employer] is [phone].  Patients reference number is [id number].    Please advise and notify.    Thank you.    Juan [phone]

## 2025-04-17 ENCOUNTER — TELEPHONE (OUTPATIENT)
Dept: GERIATRICS | Facility: OTHER | Age: 65
End: 2025-04-17

## 2025-04-17 NOTE — TELEPHONE ENCOUNTER
Discussed appeal process for PET CT of the brain, discontinued order that was filed at last visit. Wife in agreeable at this time. Addendum completed from last visit to discontinue order. Plan to discuss care plan in further detail at next visit (care conference). Will reach out to SW for additional needs prior to visit.

## 2025-04-17 NOTE — TELEPHONE ENCOUNTER
"Patients spouse called in very upset that she is not able to get patients PET scan done. She is now on a wait list for that. She says she has to keep taking vacations days for this and \"feels her time is not important to the office.\" Patient says the reason the PET scan was denied is because it was \"written poorly\" per wife. It says because patient has trouble remembering things. That will not fly with the insurance company as she has spoke to them as well. Please advise and call wife with an update as she would like this decision appealed.   "

## 2025-04-21 NOTE — ASSESSMENT & PLAN NOTE
65 y.o. male, with carotid stenosis, multiple watershed infarcts followed by Dr. Steele who is presenting to [facility] for follow-up of his seizures.     Diagnosis: Focal epilepsy, patient is not experienced any clear seizures since his levetiracetam dose was increased to 1500 mg twice daily.  He was recently hospitalized for a rage episode followed by loss of consciousness and rigidity.  Unclear as to cause of that episode, was potentially thought to be related to serotonin syndrome. Medications have since been adjusted.  See HPI for details.    Reason for Continued Antiepileptic Medications: High risk for further seizures due to recurrent strokes.    Plan: Continue levetiracetam 1500 mg twice daily            Call office if you experience any further seizures            Follow-up:  Keep already scheduled appt with Dr. Brasher

## 2025-04-23 ENCOUNTER — TELEPHONE (OUTPATIENT)
Age: 65
End: 2025-04-23

## 2025-05-29 ENCOUNTER — NURSE TRIAGE (OUTPATIENT)
Age: 65
End: 2025-05-29

## 2025-05-29 DIAGNOSIS — G40.109 FOCAL EPILEPSY (HCC): Primary | ICD-10-CM

## 2025-05-29 NOTE — TELEPHONE ENCOUNTER
"REASON FOR CONVERSATION: possible medication side effects    SYMPTOMS: falls, off balance, incontinence, dark urine, r hand tremor and weight gain    OTHER HEALTH INFORMATION: none    PROTOCOL DISPOSITION: Discuss with Provider and Call Back Patient (overriding Callback by PCP Today)    CARE ADVICE PROVIDED: advised if worsening symptoms or stoke like symptoms should be evaluated in the ER    PRACTICE FOLLOW-UP: please advise    [phone]--ok to leave detailed message    Reason for Disposition   Caller has NON-URGENT medicine question about med that PCP or specialist prescribed and triager unable to answer question    Answer Assessment - Initial Assessment Questions  1. NAME of MEDICINE: \"What medicine(s) are you calling about?\"     oxcarbazepine    3. PRESCRIBER: \"Who prescribed the medicine?\" Reason: if prescribed by specialist, call should be referred to that group.      Dr dodd  4. SYMPTOMS: \"Do you have any symptoms?\" If Yes, ask: \"What symptoms are you having?\"  \"How bad are the symptoms (e.g., mild, moderate, severe)    pt's wife soni called and states that on 4/8 pt started oxcarbazepine.  and within the last 2 weeks, he had 2 falls in 2 days, off balance, very fatigued, pretty much all the time and having occassional urine and bowel incontinece.  he has never had incontinence before.   urine is really dark also.  r hand tremor is worse not than before.  now it is pretty much all the time and prior it was only occassional.  Also some weight gain, she thinks at least 10 lbs since April. Nothing has changed with his diet.  oxcarbazepine is the only recent change.   he is currently taking   oxcarbazepine 300mg 1.5 tabs every 12 hrs  LEVE 500mg 3 tabs every 12 hrs  no seizures since last appt on 4/8  he is drinking fluids but not sure if he is drinking enough  no stroke like symptoms    Protocols used: Medication Question Call-Adult-[place]    "

## 2025-05-30 NOTE — TELEPHONE ENCOUNTER
He has been on oxcarbazepine since March when he was in the hospital. At his last appointment, we only increased the dose from 300 mg twice a day to 450 mg twice a day, which is a rather small increase and is still at a low dose. The symptoms he is experiencing are also not common side effects from Oxcarbazepine.     I would suggest repeating some blood work for now, but I would recommend starting with his PCP to look in to other causes of his symptoms. Otherwise, he may need to be seen sooner for further evaluation.

## 2025-06-11 ENCOUNTER — APPOINTMENT (OUTPATIENT)
Dept: LAB | Facility: CLINIC | Age: 65
End: 2025-06-11
Payer: COMMERCIAL

## 2025-06-11 DIAGNOSIS — E04.2 MULTIPLE THYROID NODULES: ICD-10-CM

## 2025-06-11 DIAGNOSIS — G40.109 FOCAL EPILEPSY (HCC): ICD-10-CM

## 2025-06-11 DIAGNOSIS — E78.2 MIXED HYPERLIPIDEMIA: ICD-10-CM

## 2025-06-11 DIAGNOSIS — Z12.5 SPECIAL SCREENING FOR MALIGNANT NEOPLASM OF PROSTATE: ICD-10-CM

## 2025-06-11 DIAGNOSIS — R26.89 IMBALANCE: ICD-10-CM

## 2025-06-11 DIAGNOSIS — F01.B18 MODERATE VASCULAR DEMENTIA WITH OTHER BEHAVIORAL DISTURBANCE (HCC): ICD-10-CM

## 2025-06-11 DIAGNOSIS — R73.01 IMPAIRED FASTING GLUCOSE: ICD-10-CM

## 2025-06-11 LAB
ALBUMIN SERPL BCG-MCNC: 4.4 G/DL (ref 3.5–5)
ALP SERPL-CCNC: 124 U/L (ref 34–104)
ALT SERPL W P-5'-P-CCNC: 26 U/L (ref 7–52)
ANION GAP SERPL CALCULATED.3IONS-SCNC: 10 MMOL/L (ref 4–13)
AST SERPL W P-5'-P-CCNC: 24 U/L (ref 13–39)
BASOPHILS # BLD AUTO: 0.12 THOUSANDS/ÂΜL (ref 0–0.1)
BASOPHILS NFR BLD AUTO: 1 % (ref 0–1)
BILIRUB SERPL-MCNC: 0.44 MG/DL (ref 0.2–1)
BUN SERPL-MCNC: 26 MG/DL (ref 5–25)
CALCIUM SERPL-MCNC: 9.4 MG/DL (ref 8.4–10.2)
CHLORIDE SERPL-SCNC: 100 MMOL/L (ref 96–108)
CHOLEST SERPL-MCNC: 122 MG/DL (ref ?–200)
CO2 SERPL-SCNC: 27 MMOL/L (ref 21–32)
CREAT SERPL-MCNC: 1.06 MG/DL (ref 0.6–1.3)
EOSINOPHIL # BLD AUTO: 0.46 THOUSAND/ÂΜL (ref 0–0.61)
EOSINOPHIL NFR BLD AUTO: 5 % (ref 0–6)
ERYTHROCYTE [DISTWIDTH] IN BLOOD BY AUTOMATED COUNT: 14.6 % (ref 11.6–15.1)
EST. AVERAGE GLUCOSE BLD GHB EST-MCNC: 126 MG/DL
FOLATE SERPL-MCNC: 14.4 NG/ML
GFR SERPL CREATININE-BSD FRML MDRD: 72 ML/MIN/1.73SQ M
GLUCOSE P FAST SERPL-MCNC: 92 MG/DL (ref 65–99)
HBA1C MFR BLD: 6 %
HCT VFR BLD AUTO: 46.6 % (ref 36.5–49.3)
HDLC SERPL-MCNC: 34 MG/DL
HGB BLD-MCNC: 14.8 G/DL (ref 12–17)
IMM GRANULOCYTES # BLD AUTO: 0.04 THOUSAND/UL (ref 0–0.2)
IMM GRANULOCYTES NFR BLD AUTO: 0 % (ref 0–2)
LDLC SERPL CALC-MCNC: 30 MG/DL (ref 0–100)
LEVETIRACETAM SERPL-MCNC: 76.5 UG/ML (ref 12–46)
LYMPHOCYTES # BLD AUTO: 3.06 THOUSANDS/ÂΜL (ref 0.6–4.47)
LYMPHOCYTES NFR BLD AUTO: 33 % (ref 14–44)
MCH RBC QN AUTO: 29.6 PG (ref 26.8–34.3)
MCHC RBC AUTO-ENTMCNC: 31.8 G/DL (ref 31.4–37.4)
MCV RBC AUTO: 93 FL (ref 82–98)
MONOCYTES # BLD AUTO: 0.96 THOUSAND/ÂΜL (ref 0.17–1.22)
MONOCYTES NFR BLD AUTO: 10 % (ref 4–12)
NEUTROPHILS # BLD AUTO: 4.77 THOUSANDS/ÂΜL (ref 1.85–7.62)
NEUTS SEG NFR BLD AUTO: 51 % (ref 43–75)
NRBC BLD AUTO-RTO: 0 /100 WBCS
PLATELET # BLD AUTO: 276 THOUSANDS/UL (ref 149–390)
PMV BLD AUTO: 10.1 FL (ref 8.9–12.7)
POTASSIUM SERPL-SCNC: 4.2 MMOL/L (ref 3.5–5.3)
PROT SERPL-MCNC: 7.4 G/DL (ref 6.4–8.4)
PSA SERPL-MCNC: 2.77 NG/ML (ref 0–4)
RBC # BLD AUTO: 5 MILLION/UL (ref 3.88–5.62)
SODIUM SERPL-SCNC: 137 MMOL/L (ref 135–147)
TRIGL SERPL-MCNC: 292 MG/DL (ref ?–150)
VIT B12 SERPL-MCNC: 354 PG/ML (ref 180–914)
WBC # BLD AUTO: 9.41 THOUSAND/UL (ref 4.31–10.16)

## 2025-06-11 PROCEDURE — 83036 HEMOGLOBIN GLYCOSYLATED A1C: CPT

## 2025-06-11 PROCEDURE — 83520 IMMUNOASSAY QUANT NOS NONAB: CPT

## 2025-06-11 PROCEDURE — 82746 ASSAY OF FOLIC ACID SERUM: CPT

## 2025-06-11 PROCEDURE — 80061 LIPID PANEL: CPT

## 2025-06-11 PROCEDURE — G0103 PSA SCREENING: HCPCS

## 2025-06-11 PROCEDURE — 85025 COMPLETE CBC W/AUTO DIFF WBC: CPT

## 2025-06-11 PROCEDURE — 80053 COMPREHEN METABOLIC PANEL: CPT

## 2025-06-11 PROCEDURE — 36415 COLL VENOUS BLD VENIPUNCTURE: CPT

## 2025-06-11 PROCEDURE — 82607 VITAMIN B-12: CPT

## 2025-06-11 PROCEDURE — 80183 DRUG SCRN QUANT OXCARBAZEPIN: CPT

## 2025-06-15 LAB — OXCARBAZEPINE SERPL-MCNC: 18 UG/ML (ref 10–35)

## 2025-06-30 ENCOUNTER — OFFICE VISIT (OUTPATIENT)
Age: 65
End: 2025-06-30
Payer: COMMERCIAL

## 2025-06-30 VITALS
SYSTOLIC BLOOD PRESSURE: 122 MMHG | BODY MASS INDEX: 33.49 KG/M2 | DIASTOLIC BLOOD PRESSURE: 60 MMHG | TEMPERATURE: 98 F | OXYGEN SATURATION: 99 % | WEIGHT: 208.4 LBS | HEART RATE: 63 BPM | HEIGHT: 66 IN

## 2025-06-30 DIAGNOSIS — F01.B18 MODERATE VASCULAR DEMENTIA WITH OTHER BEHAVIORAL DISTURBANCE (HCC): Primary | ICD-10-CM

## 2025-06-30 PROCEDURE — 99483 ASSMT & CARE PLN PT COG IMP: CPT

## 2025-06-30 NOTE — PROGRESS NOTES
[facility]  [address], [address]  [place] [id number]  [phone]    Care Conference    NAME: Constanza Zhu AGE: 65 y.o. SEX: male  YOB: 1960  DATE OF ASSESSMENT: 03/24/2025  DATE OF CONFERENCE: 06/30/2025    Family Present: Faiza, his wife   Staff Present: ERIC Lyon and Samantha Ramos RN    Patient / Family Goals of Care: Review cognitive decline and associated symptoms, discuss treatment options and care planning.     Medical Conditions (Current/Historical): Recurrent strokes, Seizures, Primary hypertension, Urinary retention    Neuropsychological  Staging: Moderate Vascular Dementia  Juan Cognitive Assessment: Unable to complete    Decision-making capacity: May have limitations in complex medical or financial decisions.  Consider neuropsychology evaluation for further determination  Caregiver Review: no additional needs identified, SW is available if needs would increase - they currently have around the clock caregivers paid/family    Safety:  Medication Review: Medications reviewed, some medications on list may cause increased confusion and falls. But these medications are used to manage chronic conditions, risks outweigh benefits at this time.   Driving: Not driving.  Fall Risk: High fall risk  ACP Review: POA and Living Will in place.     Remain active physically, mentally and socially  Pharmaceutical and non-pharmaceutical interventions discussed  Engage in cognitively challenging exercises such as crosswords and puzzles  Maintain chronic conditions under control  Continues in PT/OT/ST post-CVA   Repeat cognitive assessment in 6 months      Diagnostic Studies  Review of blood work   Vitamin B12: 354  Folate: 14.4  Review of CT head without contrast (3/8/25)  IMPRESSION  No acute intracranial abnormality. Chronic microangiopathic changes. Chronic infarcts are noted.     Functional Status     Activities of Daily Living: Dependent, except for  feeding   Instrumental Activities of Daily Living: Dependent  Encourage appropriate footwear at all times  Review fall risk prevention tips and adjust within the home environment as needed    Medications Reviewed   Check with PCP before using over the counter medications  Avoid over the counter medications that can affect cognition (e.g., Benadryl, Tylenol PM)   Avoid NSAIDs due to risk of GI bleed and renal impairment    Other Findings   Overall health  BMI: 34.86 kg/m2  Maintain well-balanced diet  Continue following with primary care physician regularly  Recurrent strokes  Hx of multiple CVAs, recently had BL carotid artery stents placed.   MRI on 3/8/2025 IMPRESSION - No acute intracranial abnormality. Chronic microangiopathic changes. Chronic infarcts are noted.   Continues on ASA, Brilinta and Atorvastatin for risk reduction.   Goal BP per neurology is <130/80.   Due to recurrent CVAs. Continues to see PT/OT/ST.    Educated to medication compliance, and close follow up with multiple specialities for risk reduction strategies and close management of chronic conditions.   Seizures  Continue Keppra 1,500 mg BID, Trileptal 300 mg BID for seizure prevention.   Continue to follow closely with neurology for management.    Primary hypertension  Goal BP  <130/80.   Continues on Hydrochlorothiazide, Metoprolol for BP management.   Urinary retention  Hx of BPH with urinary retention.   Continues on Flomax 0.4 mg for symptom relief.     Social /safety recommendations & considerations  Consider an Adult Day Program for positive socialization, physical exercise, cognitive stimulation and family respite  Consider a home care aide to assist with daily care needs  Stay in touch with family and friends  Recommend review of fall risk prevention tips  Recommend use of fall precautions including fall alert device  Scam safety: do not answer suspicious mail, phone calls, email or text messages without having another person review  for safety  Consider assistance for medication administration such as blister packaging or use of an automated pill dispenser  Recommend contacting your local [facility] Agency on Aging for possible eligible programs such as OPTIONS, Caregiver Support Program, or WAIVER    Long term care recommendations  Maintain updated advance directives and provide a copy to your primary care provider  Consider caregiver support groups and educational resources through the Alzheimer's Association; access Alzheimer's Association 24/7 Helpline at 1-715.111.3631  [facility] does offer a monthly caregiver support group. If interested, please speak with a .  Utilize reorientation and redirection as needed (dependent on situation)  Review educational information provided    Patient and family verbalized understanding of above care plan and in agreement with plan. For care coordination purposes, this care plan will be shared with your primary care provider. With any questions, please contact our office at [phone].

## 2025-06-30 NOTE — PROGRESS NOTES
[facility]  [address], [address]  [place] [address] [phone]    Care Conference: Resources Provided    MSW provided the following resources, along with a copy of the care plan:  [address] [facility] [id number]    General Information  - 10 Ways to Love Your Brain  - Memory loss ladder  - Packet with Alzheimer's Association information including: definition of dementia, communication tips for different stages, common behaviors and response strategies  - Vascular Dementia packet    Caregiver Support  - Flyer for [facility] Virtual Caregiver Support Group (meeting 2x per month by Zoom)  - Alzheimer's Association Rx Pad (guide to website and 24/7 helpline, 1-330.949.9548)    Safety  - CDC fall prevention / home safety checklist    Community Resources  - [facility] Aging in Place Resource Guide (contains information about higher levels of care, homecare, etc.)  - [facility]: Dementia Resource Guide & Brochure    Resources provided at initial assessment:   -Home care, Waiver, AAA  -OPTIONS Program  -Steps to Apply for Waiver/Resource list  -Care Patrol brochure  -LVAIP Resource Guide  -Cognitive Sonia list  -Fall Alert device  -Transportation Options  -Misael Grandparent brochure

## 2025-07-08 ENCOUNTER — OFFICE VISIT (OUTPATIENT)
Dept: NEUROLOGY | Facility: CLINIC | Age: 65
End: 2025-07-08
Payer: COMMERCIAL

## 2025-07-08 VITALS
SYSTOLIC BLOOD PRESSURE: 164 MMHG | HEIGHT: 66 IN | BODY MASS INDEX: 34.7 KG/M2 | DIASTOLIC BLOOD PRESSURE: 96 MMHG | HEART RATE: 78 BPM | WEIGHT: 215.9 LBS | TEMPERATURE: 98.3 F

## 2025-07-08 DIAGNOSIS — G40.109 FOCAL EPILEPSY (HCC): Primary | ICD-10-CM

## 2025-07-08 DIAGNOSIS — F01.50 VASCULAR DEMENTIA (HCC): ICD-10-CM

## 2025-07-08 PROCEDURE — 99214 OFFICE O/P EST MOD 30 MIN: CPT | Performed by: PSYCHIATRY & NEUROLOGY

## 2025-07-08 NOTE — PROGRESS NOTES
Name: Constanza Zhu      : 1960      MRN: [id number]  Encounter Provider: Mickey Brasher MD  Encounter Date: 2025   Encounter department: NEUROLOGY [facility]  :  Assessment & Plan  Focal epilepsy (HCC)  He has not had any additional seizures since being started on Oxcarbazepine and has been tolerating his current dose well without any side effects.     I reviewed his blood work from 2025, which had appropriate Levetiracetam level (76.5, expected for dose), Oxcarbazepine level (18), and sodium was normal at 137       -- because he is doing well, I will not make any changes to his medications today. If he would have additional seizures, his dose of Oxcarbazepine could be increased.       Vascular dementia (HCC)  He continues to have cognitive and memory deficits from his prior strokes as well as difficulty with walking. Fortunately, his memory/cognition has been stable and if anything, his gait has been improving since working with PT/OT. I would recommend he continue to work with therapies to improve his function and follow up with Dr. Steele as planned.         He will Return in about 4 months (around 2025).      History of Present Illness   HPI  Constanza Zhu is a 65 y.o. male with carotid stenosis, prior strokes, and watershed infarctions, also with focal epilepsy, who is returning to Neurology office for follow up of his seizures.     Current seizure medications:  1. Levetiracetam  1500 mg twice a day  2. Oxcarbazepine  450 mg twice a day   Other medications as per [facility].    Since his last visit, he has actually been doing really well. He has not had any other seizures. They initially had notes some possible side effects to Oxcarbazepine when first increased, and had called into the office in May, but these symptoms have resolved. If anything, he is walking better and now able to get around with a walker.       Prior Seizure Medications: none    His history was also  "obtained from his wife, who was present at today's visit.     Review of Systems  I personally reviewed the review of systems that was entered by the medical assistant in a separate note       Objective   /96 (BP Location: Right arm, Patient Position: Sitting, Cuff Size: Large)   Pulse 78   Temp 98.3 °F (36.8 °C) (Temporal)   Ht 5' 6\" (1.676 m)   Wt 97.9 kg (215 lb 14.4 oz)   BMI 34.85 kg/m²      Physical Exam    Voice recognition software was used in the generation of this note. There may be unintentional errors including grammatical errors, spelling errors, or pronoun errors.      :  "

## 2025-07-08 NOTE — PATIENT INSTRUCTIONS
-- continue to take Levetiracetam and Oxcarbazepine unchanged.     -- Continue to work with PT, OT and speech therapy.     -- Please call our office if any problems arise.

## 2025-07-10 NOTE — ASSESSMENT & PLAN NOTE
He continues to have cognitive and memory deficits from his prior strokes as well as difficulty with walking. Fortunately, his memory/cognition has been stable and if anything, his gait has been improving since working with PT/OT. I would recommend he continue to work with therapies to improve his function and follow up with Dr. Steele as planned.

## 2025-07-10 NOTE — ASSESSMENT & PLAN NOTE
He has not had any additional seizures since being started on Oxcarbazepine and has been tolerating his current dose well without any side effects.     I reviewed his blood work from 6/11/2025, which had appropriate Levetiracetam level (76.5, expected for dose), Oxcarbazepine level (18), and sodium was normal at 137       -- because he is doing well, I will not make any changes to his medications today. If he would have additional seizures, his dose of Oxcarbazepine could be increased.

## 2025-07-11 ENCOUNTER — TELEPHONE (OUTPATIENT)
Dept: ENDOCRINOLOGY | Facility: HOSPITAL | Age: 65
End: 2025-07-11

## 2025-07-11 DIAGNOSIS — E04.2 MULTIPLE THYROID NODULES: Primary | ICD-10-CM

## 2025-07-22 ENCOUNTER — HOSPITAL ENCOUNTER (OUTPATIENT)
Dept: NON INVASIVE DIAGNOSTICS | Age: 65
Discharge: HOME | End: 2025-07-22
Attending: SURGERY
Payer: COMMERCIAL

## 2025-07-22 DIAGNOSIS — I65.23 CAROTID STENOSIS, BILATERAL: ICD-10-CM

## 2025-07-22 DIAGNOSIS — I71.43 INFRARENAL ABDOMINAL AORTIC ANEURYSM (AAA) WITHOUT RUPTURE (HCC): ICD-10-CM

## 2025-07-22 PROCEDURE — 93880 EXTRACRANIAL BILAT STUDY: CPT

## 2025-07-22 PROCEDURE — 93978 VASCULAR STUDY: CPT

## 2025-07-22 PROCEDURE — 93978 VASCULAR STUDY: CPT | Performed by: SURGERY

## 2025-07-22 PROCEDURE — 93880 EXTRACRANIAL BILAT STUDY: CPT | Performed by: SURGERY

## 2025-07-24 ENCOUNTER — TELEPHONE (OUTPATIENT)
Dept: VASCULAR SURGERY | Facility: CLINIC | Age: 65
End: 2025-07-24

## 2025-07-24 NOTE — TELEPHONE ENCOUNTER
Attempted to contact patient to schedule appointment(s) listed below.  Requested patient call [phone] to schedule appointment(s).    Patient's appointment(s) are due now.    Dopplers  [x] Abdominal Aorta Iliac (AOIL)  [x] Carotid (CV)   [] Celiac and/or Mesenteric  [] Endovascular Aortic Repair (EVAR)   [] Hemodialysis Access (HD)   [] Lower Limb Arterial (MARCELLO)  [] Lower Limb Venous (LEV)  [] Lower Limb Venous Duplex with Reflux (LEVDR)  [] Renal Artery  [] Upper Limb Arterial (UEA)    [] Upper Limb Venous (UEV)              [] HERRERA and Waveform analysis     Advanced Imaging   [] CTA head/neck    [] CTA abdomen    [] CTA abdomen & pelvis    [] CT abdomen with/ without contrast  [] CT abdomen with contrast  [] CT abdomen without contrast    [] CT abdomen & pelvis with/ without contrast  [] CT abdomen & pelvis with contrast  [] CT abdomen & pelvis without contrast    Office Visit   [] New patient, patient last seen over 3 years ago  [x] Risk factor modification (RFM)   [x] Follow up   [] Lost to follow up (LTFU)   Called & LMOM to schedule 1 yr ov fu/  RFM RR AOIL CV 7/14/24 L/S 7/19/24 MJL

## 2025-07-25 ENCOUNTER — TELEPHONE (OUTPATIENT)
Dept: NEUROLOGY | Facility: CLINIC | Age: 65
End: 2025-07-25

## 2025-07-25 DIAGNOSIS — G40.109 FOCAL EPILEPSY (HCC): ICD-10-CM

## 2025-07-25 RX ORDER — OXCARBAZEPINE 300 MG/1
450 TABLET, FILM COATED ORAL EVERY 12 HOURS SCHEDULED
Qty: 30 TABLET | Refills: 0 | Status: SHIPPED | OUTPATIENT
Start: 2025-07-25

## 2025-07-25 NOTE — TELEPHONE ENCOUNTER
Spoke with pt's wife, Faiza. She stated that pt normally gets oxcarbazepine mailed to him through [facility]. The medication shipment is lost in the mail, and pt will be out of medication by han. Faiza is requesting to have the provider send an emergency 10 day supply of oxcarbazepine to [facility] in [place]. Pended a 10 day supply and routed to the provider to review. Also sent Epic SC message, as will be out of medication after tonight's dose. Faiza is requesting a call when the script is sent to the pharmacy.

## 2025-07-25 NOTE — TELEPHONE ENCOUNTER
Patient spouse called in and stated that pt medication got lost with Home mail order and will need an emergency supply.   Pt only has enough for tonight.   OXcarbazepine (TRILEPTAL) 300 mg tablet     She is asking to please send to [facility] in [place] Marlo Lopez in [facility] for assistance.

## 2025-07-25 NOTE — TELEPHONE ENCOUNTER
Called and left vm to notify due a sudden schedule change we need to r/s appt on 8/5/25 @2:30pm. Offered to double book on 8/7/25 offered the following times 9:45, 10:45,11:45. Provider is also agreeable to doing a VR. I apologized for any inconvenience we may cause.

## 2025-07-30 ENCOUNTER — HOSPITAL ENCOUNTER (OUTPATIENT)
Dept: MRI IMAGING | Facility: HOSPITAL | Age: 65
Discharge: HOME | End: 2025-07-30
Payer: COMMERCIAL

## 2025-07-30 DIAGNOSIS — F01.B18 MODERATE VASCULAR DEMENTIA WITH OTHER BEHAVIORAL DISTURBANCE (HCC): ICD-10-CM

## 2025-07-30 PROCEDURE — 70551 MRI BRAIN STEM W/O DYE: CPT

## 2025-08-04 ENCOUNTER — TELEPHONE (OUTPATIENT)
Dept: NEUROLOGY | Facility: CLINIC | Age: 65
End: 2025-08-04

## 2025-08-05 DIAGNOSIS — G40.109 FOCAL EPILEPSY (HCC): ICD-10-CM

## 2025-08-06 RX ORDER — OXCARBAZEPINE 300 MG/1
450 TABLET, FILM COATED ORAL EVERY 12 HOURS SCHEDULED
Qty: 270 TABLET | Refills: 0 | OUTPATIENT
Start: 2025-08-06

## 2025-08-07 ENCOUNTER — OFFICE VISIT (OUTPATIENT)
Dept: NEUROLOGY | Facility: CLINIC | Age: 65
End: 2025-08-07
Payer: COMMERCIAL

## 2025-08-07 VITALS
BODY MASS INDEX: 34.01 KG/M2 | HEART RATE: 77 BPM | DIASTOLIC BLOOD PRESSURE: 70 MMHG | OXYGEN SATURATION: 99 % | SYSTOLIC BLOOD PRESSURE: 140 MMHG | TEMPERATURE: 97.7 F | HEIGHT: 66 IN | WEIGHT: 211.6 LBS

## 2025-08-07 DIAGNOSIS — R41.3 MEMORY DEFICITS: ICD-10-CM

## 2025-08-07 DIAGNOSIS — Z86.73 HISTORY OF STROKE: Primary | ICD-10-CM

## 2025-08-07 DIAGNOSIS — Z95.828 INTERNAL CAROTID ARTERY STENT PRESENT: ICD-10-CM

## 2025-08-07 DIAGNOSIS — R56.9 SEIZURES (HCC): ICD-10-CM

## 2025-08-07 PROCEDURE — 99214 OFFICE O/P EST MOD 30 MIN: CPT | Performed by: PSYCHIATRY & NEUROLOGY

## 2025-08-21 DIAGNOSIS — G40.109 FOCAL EPILEPSY (HCC): ICD-10-CM

## 2025-08-22 RX ORDER — OXCARBAZEPINE 300 MG/1
450 TABLET, FILM COATED ORAL EVERY 12 HOURS SCHEDULED
Qty: 270 TABLET | Refills: 1 | Status: SHIPPED | OUTPATIENT
Start: 2025-08-22